# Patient Record
Sex: FEMALE | Race: ASIAN | NOT HISPANIC OR LATINO | URBAN - METROPOLITAN AREA
[De-identification: names, ages, dates, MRNs, and addresses within clinical notes are randomized per-mention and may not be internally consistent; named-entity substitution may affect disease eponyms.]

---

## 2018-12-18 ENCOUNTER — INPATIENT (INPATIENT)
Facility: HOSPITAL | Age: 53
LOS: 5 days | Discharge: HOME HEALTH | End: 2018-12-24
Attending: INTERNAL MEDICINE | Admitting: INTERNAL MEDICINE
Payer: MEDICAID

## 2018-12-18 VITALS
DIASTOLIC BLOOD PRESSURE: 69 MMHG | SYSTOLIC BLOOD PRESSURE: 116 MMHG | RESPIRATION RATE: 19 BRPM | HEART RATE: 72 BPM | TEMPERATURE: 99 F | OXYGEN SATURATION: 98 % | WEIGHT: 123.02 LBS

## 2018-12-18 LAB
ALBUMIN SERPL ELPH-MCNC: 3 G/DL — LOW (ref 3.3–5)
ALP SERPL-CCNC: 550 U/L — HIGH (ref 40–120)
ALT FLD-CCNC: 82 U/L — HIGH (ref 12–78)
ANION GAP SERPL CALC-SCNC: 9 MMOL/L — SIGNIFICANT CHANGE UP (ref 5–17)
APPEARANCE UR: CLEAR — SIGNIFICANT CHANGE UP
APTT BLD: 33.6 SEC — SIGNIFICANT CHANGE UP (ref 28.5–37)
AST SERPL-CCNC: 67 U/L — HIGH (ref 15–37)
BASOPHILS # BLD AUTO: 0.05 K/UL — SIGNIFICANT CHANGE UP (ref 0–0.2)
BASOPHILS NFR BLD AUTO: 0.5 % — SIGNIFICANT CHANGE UP (ref 0–2)
BILIRUB SERPL-MCNC: 2.3 MG/DL — HIGH (ref 0.2–1.2)
BILIRUB UR-MCNC: NEGATIVE — SIGNIFICANT CHANGE UP
BUN SERPL-MCNC: 6 MG/DL — LOW (ref 7–23)
CALCIUM SERPL-MCNC: 8.7 MG/DL — SIGNIFICANT CHANGE UP (ref 8.5–10.1)
CHLORIDE SERPL-SCNC: 99 MMOL/L — SIGNIFICANT CHANGE UP (ref 96–108)
CK MB CFR SERPL CALC: <1 NG/ML — SIGNIFICANT CHANGE UP (ref 0.5–3.6)
CO2 SERPL-SCNC: 28 MMOL/L — SIGNIFICANT CHANGE UP (ref 22–31)
COLOR SPEC: YELLOW — SIGNIFICANT CHANGE UP
CREAT SERPL-MCNC: 0.7 MG/DL — SIGNIFICANT CHANGE UP (ref 0.5–1.3)
DIFF PNL FLD: NEGATIVE — SIGNIFICANT CHANGE UP
EOSINOPHIL # BLD AUTO: 0.15 K/UL — SIGNIFICANT CHANGE UP (ref 0–0.5)
EOSINOPHIL NFR BLD AUTO: 1.4 % — SIGNIFICANT CHANGE UP (ref 0–6)
GLUCOSE SERPL-MCNC: 297 MG/DL — HIGH (ref 70–99)
GLUCOSE UR QL: 1000 MG/DL
HCT VFR BLD CALC: 32.3 % — LOW (ref 34.5–45)
HGB BLD-MCNC: 10.6 G/DL — LOW (ref 11.5–15.5)
IMM GRANULOCYTES NFR BLD AUTO: 0.6 % — SIGNIFICANT CHANGE UP (ref 0–1.5)
INR BLD: 1.3 RATIO — HIGH (ref 0.88–1.16)
KETONES UR-MCNC: NEGATIVE — SIGNIFICANT CHANGE UP
LEUKOCYTE ESTERASE UR-ACNC: ABNORMAL
LYMPHOCYTES # BLD AUTO: 2.62 K/UL — SIGNIFICANT CHANGE UP (ref 1–3.3)
LYMPHOCYTES # BLD AUTO: 25.2 % — SIGNIFICANT CHANGE UP (ref 13–44)
MAGNESIUM SERPL-MCNC: 2.2 MG/DL — SIGNIFICANT CHANGE UP (ref 1.6–2.6)
MCHC RBC-ENTMCNC: 25.1 PG — LOW (ref 27–34)
MCHC RBC-ENTMCNC: 32.8 GM/DL — SIGNIFICANT CHANGE UP (ref 32–36)
MCV RBC AUTO: 76.5 FL — LOW (ref 80–100)
MONOCYTES # BLD AUTO: 0.85 K/UL — SIGNIFICANT CHANGE UP (ref 0–0.9)
MONOCYTES NFR BLD AUTO: 8.2 % — SIGNIFICANT CHANGE UP (ref 2–14)
NEUTROPHILS # BLD AUTO: 6.68 K/UL — SIGNIFICANT CHANGE UP (ref 1.8–7.4)
NEUTROPHILS NFR BLD AUTO: 64.1 % — SIGNIFICANT CHANGE UP (ref 43–77)
NITRITE UR-MCNC: NEGATIVE — SIGNIFICANT CHANGE UP
NRBC # BLD: 0 /100 WBCS — SIGNIFICANT CHANGE UP (ref 0–0)
PH UR: 7 — SIGNIFICANT CHANGE UP (ref 5–8)
PLATELET # BLD AUTO: 193 K/UL — SIGNIFICANT CHANGE UP (ref 150–400)
POTASSIUM SERPL-MCNC: 4 MMOL/L — SIGNIFICANT CHANGE UP (ref 3.5–5.3)
POTASSIUM SERPL-SCNC: 4 MMOL/L — SIGNIFICANT CHANGE UP (ref 3.5–5.3)
PROT SERPL-MCNC: 8.1 GM/DL — SIGNIFICANT CHANGE UP (ref 6–8.3)
PROT UR-MCNC: NEGATIVE MG/DL — SIGNIFICANT CHANGE UP
PROTHROM AB SERPL-ACNC: 14.7 SEC — HIGH (ref 10–12.9)
RBC # BLD: 4.22 M/UL — SIGNIFICANT CHANGE UP (ref 3.8–5.2)
RBC # FLD: 15.7 % — HIGH (ref 10.3–14.5)
SODIUM SERPL-SCNC: 136 MMOL/L — SIGNIFICANT CHANGE UP (ref 135–145)
SP GR SPEC: 1 — LOW (ref 1.01–1.02)
TROPONIN I SERPL-MCNC: <.015 NG/ML — SIGNIFICANT CHANGE UP (ref 0.01–0.04)
TSH SERPL-MCNC: 2.09 UIU/ML — SIGNIFICANT CHANGE UP (ref 0.36–3.74)
UROBILINOGEN FLD QL: NEGATIVE MG/DL — SIGNIFICANT CHANGE UP
WBC # BLD: 10.41 K/UL — SIGNIFICANT CHANGE UP (ref 3.8–10.5)
WBC # FLD AUTO: 10.41 K/UL — SIGNIFICANT CHANGE UP (ref 3.8–10.5)

## 2018-12-18 PROCEDURE — 99223 1ST HOSP IP/OBS HIGH 75: CPT

## 2018-12-18 PROCEDURE — 71250 CT THORAX DX C-: CPT | Mod: 26

## 2018-12-18 PROCEDURE — 70450 CT HEAD/BRAIN W/O DYE: CPT | Mod: 26

## 2018-12-18 PROCEDURE — 99285 EMERGENCY DEPT VISIT HI MDM: CPT

## 2018-12-18 PROCEDURE — 93010 ELECTROCARDIOGRAM REPORT: CPT

## 2018-12-18 RX ORDER — MECLIZINE HCL 12.5 MG
25 TABLET ORAL ONCE
Qty: 0 | Refills: 0 | Status: COMPLETED | OUTPATIENT
Start: 2018-12-18 | End: 2018-12-18

## 2018-12-18 RX ADMIN — Medication 25 MILLIGRAM(S): at 20:48

## 2018-12-18 NOTE — H&P ADULT - NSHPPHYSICALEXAM_GEN_ALL_CORE
GENERAL: NAD, well-groomed, well-developed  HEAD:  Atraumatic, Normocephalic  EYES: EOMI, PERRLA, conjunctiva and sclera clear  ENMT: No tonsillar erythema, exudates, or enlargement; Moist mucous membranes, No lesions  NECK: Supple, No JVD, Normal thyroid  NERVOUS SYSTEM:  Alert & Oriented X3, Good concentration, CN 2-12 intact, strength 5/5, ataxia +  CHEST/LUNG: Clear to ascultation bilaterally; No rales, rhonchi, wheezing, or rubs  HEART: Regular rate and rhythm; No murmurs, rubs, or gallops  ABDOMEN: Soft, Nontender, Nondistended; Bowel sounds present  EXTREMITIES: no clubbing, cyanosis, or edema  SKIN: no rashes or lesions  PSYCH: normal affect and behavior

## 2018-12-18 NOTE — H&P ADULT - NSHPLABSRESULTS_GEN_ALL_CORE
Vital Signs Last 24 Hrs  T(C): 37.5 (19 Dec 2018 00:34), Max: 37.5 (19 Dec 2018 00:34)  T(F): 99.5 (19 Dec 2018 00:34), Max: 99.5 (19 Dec 2018 00:34)  HR: 74 (19 Dec 2018 00:34) (72 - 75)  BP: 119/70 (19 Dec 2018 00:34) (116/69 - 119/70)  BP(mean): --  RR: 18 (19 Dec 2018 00:34) (18 - 19)  SpO2: 98% (19 Dec 2018 00:34) (98% - 98%)          LABS:                        10.6   10.41 )-----------( 193      ( 18 Dec 2018 21:21 )             32.3         136  |  99  |  6<L>  ----------------------------<  297<H>  4.0   |  28  |  0.70    Ca    8.7      18 Dec 2018 21:21  Mg     2.2         TPro  8.1  /  Alb  3.0<L>  /  TBili  2.3<H>  /  DBili  x   /  AST  67<H>  /  ALT  82<H>  /  AlkPhos  550<H>  1218    PT/INR - ( 18 Dec 2018 21:21 )   PT: 14.7 sec;   INR: 1.30 ratio         PTT - ( 18 Dec 2018 21:21 )  PTT:33.6 sec  Urinalysis Basic - ( 18 Dec 2018 23:45 )    Color: Yellow / Appearance: Clear / S.005 / pH: x  Gluc: x / Ketone: Negative  / Bili: Negative / Urobili: Negative mg/dL   Blood: x / Protein: Negative mg/dL / Nitrite: Negative   Leuk Esterase: Trace / RBC: x / WBC 6-10   Sq Epi: x / Non Sq Epi: Moderate / Bacteria: x        RADIOLOGY & ADDITIONAL STUDIES:    CT head:  IMPRESSION:    1)  unremarkable CT study of the brain  2)  clear sinuses and mastoids..    CT chest:  IMPRESSION: No acute pathology in the chest  The visualized upper abdomen demonstrates large gallstones in the   gallbladder and significant dilatation of the common duct

## 2018-12-18 NOTE — H&P ADULT - ASSESSMENT
53 year old woman with PMH DM2, HTN, OA, Asthma presents to ED with complaint of ataxia x 2 weeks.  Patient will require admission for at least 2 midnights as detailed below:    IMPROVE VTE Individual Risk Assessment          RISK                                                          Points    [  ] Previous VTE                                                3    [  ] Thrombophilia                                             2    [  ] Lower limb paralysis                                    2        (unable to hold up >15 seconds)      [  ] Current Cancer                                             2         (within 6 months)    [ x ] Immobilization > 24 hrs                              1    [  ] ICU/CCU stay > 24 hours                            1    [  ] Age > 60                                                    1    IMPROVE VTE Score ______1___

## 2018-12-18 NOTE — H&P ADULT - PROBLEM SELECTOR PLAN 1
Neurochecks Q 4 hrly.  Aspirin daily  ECHO, carotid doppler, MRI brain  Neurology consult.  PT Evaluation.  Will send A1C, lipids

## 2018-12-18 NOTE — ED PROVIDER NOTE - PHYSICAL EXAMINATION
Gen: Alert, NAD  Head: NC, AT   Eyes: PERRL, EOMI, normal lids/conjunctiva  ENT: normal hearing, patent oropharynx without erythema/exudate, uvula midline  Neck: supple, no tenderness, Trachea midline  Pulm: Bilateral BS, normal resp effort, no wheeze/stridor/retractions  CV: RRR, no M/R/G, 2+ radial and dp pulses bl, no edema  Abd: soft, NT/ND, +BS, no hepatosplenomegaly  Mskel: extremities x4 with normal ROM and no joint effusions. no ctl spine ttp.   Skin: no rash, no bruising   Neuro: AAOx3, no sensory/motor deficits, CN 2-12 intact. gait unstable and patient does indeed appear to be falling forward

## 2018-12-18 NOTE — H&P ADULT - PROBLEM SELECTOR PLAN 2
Gallstones, dilated CBD, elevated transaminases without symptoms.  Can follow as outpatient Gallstones, dilated CBD, elevated transaminases without symptoms.  Can follow as outpatient  Hold statin

## 2018-12-18 NOTE — ED PROVIDER NOTE - OBJECTIVE STATEMENT
Pertinent PMH/PSH/FHx/SHx and Review of Systems contained within:  53F hx dm, asthma pw dizziness and fall. patient has been falling for the past 2 weeks Pertinent PMH/PSH/FHx/SHx and Review of Systems contained within:  53F hx dm, asthma pw dizziness and fall. patient has been falling for the past 2 weeks including several falls, the last of which was 4 days ago. as a result of that fall, patient has left headache and left flank and rib pain. there is some nausea without vomiting. no vision loss or epistaxis. patient's dizziness is best described as the sensation that she is falling forward (never to the side or back). there is no cp, sob, abd pain, rash, bleeding, dysuria, weakness, numbness, slurred speech. patient did not go to the er since her last fall  Fh and Sh not otherwise contributory  ROS otherwise negative

## 2018-12-18 NOTE — H&P ADULT - HISTORY OF PRESENT ILLNESS
Pt admitted for r/o CVA, in progress. 53 year old woman with PMH DM2, HTN, OA, Asthma presents to ED with complaint of ataxia x 2 weeks.  She states she feels like she is being pulled very strongly to the left side and has fallen several times.  She denies blurred vision, slurred speech, numbness, tingling, weakness, headache.  This is the first time she has experienced such symptoms.    In the ED, work up unremarkable- CT head normal, EKG NSR.  No abdominal pain; CMP does shows elevated transaminases, gallstones and CBD dilation. 53 year old woman with PMH DM2, HTN, OA, Asthma presents to ED with complaint of ataxia x 2 weeks.  She states she feels like she is being pulled very strongly to the left side and has fallen several times.  She denies blurred vision, slurred speech, numbness, tingling, weakness, headache.  This is the first time she has experienced such symptoms.    In the ED, work up unremarkable- CT head normal, EKG NSR.  No abdominal pain; CMP does shows elevated transaminases, CT chest shows gallstones and CBD dilation.

## 2018-12-18 NOTE — ED ADULT TRIAGE NOTE - CHIEF COMPLAINT QUOTE
patient c/o of weakness and dizziness for 2 weeks , patient  had a multiples falls hit her head as per daughter positive LOC , c/o of nausea and vomiting , patient also c/o of L flank pain , patient also c/o of  chest pain  started 2 weeks ago

## 2018-12-18 NOTE — ED PROVIDER NOTE - MEDICAL DECISION MAKING DETAILS
patient pw dizziness and falls, including 4 days ago with ha and flank pain. will rule out head bleed and rib fx. prefer to keep patient for MRI given the extent of the dizziness.

## 2018-12-19 DIAGNOSIS — R93.89 ABNORMAL FINDINGS ON DIAGNOSTIC IMAGING OF OTHER SPECIFIED BODY STRUCTURES: ICD-10-CM

## 2018-12-19 DIAGNOSIS — I10 ESSENTIAL (PRIMARY) HYPERTENSION: ICD-10-CM

## 2018-12-19 DIAGNOSIS — Z29.9 ENCOUNTER FOR PROPHYLACTIC MEASURES, UNSPECIFIED: ICD-10-CM

## 2018-12-19 DIAGNOSIS — E11.9 TYPE 2 DIABETES MELLITUS WITHOUT COMPLICATIONS: ICD-10-CM

## 2018-12-19 DIAGNOSIS — G62.9 POLYNEUROPATHY, UNSPECIFIED: ICD-10-CM

## 2018-12-19 DIAGNOSIS — R29.90 UNSPECIFIED SYMPTOMS AND SIGNS INVOLVING THE NERVOUS SYSTEM: ICD-10-CM

## 2018-12-19 DIAGNOSIS — J45.20 MILD INTERMITTENT ASTHMA, UNCOMPLICATED: ICD-10-CM

## 2018-12-19 LAB
ALBUMIN SERPL ELPH-MCNC: 3 G/DL — LOW (ref 3.3–5)
ALP SERPL-CCNC: 549 U/L — HIGH (ref 40–120)
ALT FLD-CCNC: 74 U/L — SIGNIFICANT CHANGE UP (ref 12–78)
ANION GAP SERPL CALC-SCNC: 8 MMOL/L — SIGNIFICANT CHANGE UP (ref 5–17)
AST SERPL-CCNC: 63 U/L — HIGH (ref 15–37)
BASOPHILS # BLD AUTO: 0.04 K/UL — SIGNIFICANT CHANGE UP (ref 0–0.2)
BASOPHILS NFR BLD AUTO: 0.4 % — SIGNIFICANT CHANGE UP (ref 0–2)
BILIRUB SERPL-MCNC: 2.2 MG/DL — HIGH (ref 0.2–1.2)
BUN SERPL-MCNC: 5 MG/DL — LOW (ref 7–23)
CALCIUM SERPL-MCNC: 8.9 MG/DL — SIGNIFICANT CHANGE UP (ref 8.5–10.1)
CHLORIDE SERPL-SCNC: 103 MMOL/L — SIGNIFICANT CHANGE UP (ref 96–108)
CHOLEST SERPL-MCNC: 215 MG/DL — HIGH (ref 10–199)
CO2 SERPL-SCNC: 27 MMOL/L — SIGNIFICANT CHANGE UP (ref 22–31)
CREAT SERPL-MCNC: 0.61 MG/DL — SIGNIFICANT CHANGE UP (ref 0.5–1.3)
EOSINOPHIL # BLD AUTO: 0.13 K/UL — SIGNIFICANT CHANGE UP (ref 0–0.5)
EOSINOPHIL NFR BLD AUTO: 1.5 % — SIGNIFICANT CHANGE UP (ref 0–6)
EPI CELLS # UR: ABNORMAL
GLUCOSE BLDC GLUCOMTR-MCNC: 105 MG/DL — HIGH (ref 70–99)
GLUCOSE BLDC GLUCOMTR-MCNC: 199 MG/DL — HIGH (ref 70–99)
GLUCOSE BLDC GLUCOMTR-MCNC: 221 MG/DL — HIGH (ref 70–99)
GLUCOSE SERPL-MCNC: 227 MG/DL — HIGH (ref 70–99)
HBA1C BLD-MCNC: 8 % — HIGH (ref 4–5.6)
HCT VFR BLD CALC: 32.1 % — LOW (ref 34.5–45)
HDLC SERPL-MCNC: 41 MG/DL — LOW
HGB BLD-MCNC: 10.6 G/DL — LOW (ref 11.5–15.5)
IMM GRANULOCYTES NFR BLD AUTO: 0.8 % — SIGNIFICANT CHANGE UP (ref 0–1.5)
LIPID PNL WITH DIRECT LDL SERPL: 130 MG/DL — HIGH
LYMPHOCYTES # BLD AUTO: 2.58 K/UL — SIGNIFICANT CHANGE UP (ref 1–3.3)
LYMPHOCYTES # BLD AUTO: 28.9 % — SIGNIFICANT CHANGE UP (ref 13–44)
MCHC RBC-ENTMCNC: 25 PG — LOW (ref 27–34)
MCHC RBC-ENTMCNC: 33 GM/DL — SIGNIFICANT CHANGE UP (ref 32–36)
MCV RBC AUTO: 75.7 FL — LOW (ref 80–100)
MONOCYTES # BLD AUTO: 0.76 K/UL — SIGNIFICANT CHANGE UP (ref 0–0.9)
MONOCYTES NFR BLD AUTO: 8.5 % — SIGNIFICANT CHANGE UP (ref 2–14)
NEUTROPHILS # BLD AUTO: 5.34 K/UL — SIGNIFICANT CHANGE UP (ref 1.8–7.4)
NEUTROPHILS NFR BLD AUTO: 59.9 % — SIGNIFICANT CHANGE UP (ref 43–77)
PLATELET # BLD AUTO: 195 K/UL — SIGNIFICANT CHANGE UP (ref 150–400)
POTASSIUM SERPL-MCNC: 4 MMOL/L — SIGNIFICANT CHANGE UP (ref 3.5–5.3)
POTASSIUM SERPL-SCNC: 4 MMOL/L — SIGNIFICANT CHANGE UP (ref 3.5–5.3)
PROT SERPL-MCNC: 7.8 GM/DL — SIGNIFICANT CHANGE UP (ref 6–8.3)
RBC # BLD: 4.24 M/UL — SIGNIFICANT CHANGE UP (ref 3.8–5.2)
RBC # FLD: 15.8 % — HIGH (ref 10.3–14.5)
SODIUM SERPL-SCNC: 138 MMOL/L — SIGNIFICANT CHANGE UP (ref 135–145)
TOTAL CHOLESTEROL/HDL RATIO MEASUREMENT: 5.2 RATIO — SIGNIFICANT CHANGE UP (ref 3.3–7.1)
TRIGL SERPL-MCNC: 220 MG/DL — HIGH (ref 10–149)
WBC # BLD: 8.92 K/UL — SIGNIFICANT CHANGE UP (ref 3.8–10.5)
WBC # FLD AUTO: 8.92 K/UL — SIGNIFICANT CHANGE UP (ref 3.8–10.5)
WBC UR QL: ABNORMAL

## 2018-12-19 PROCEDURE — 72100 X-RAY EXAM L-S SPINE 2/3 VWS: CPT | Mod: 26

## 2018-12-19 PROCEDURE — 99233 SBSQ HOSP IP/OBS HIGH 50: CPT

## 2018-12-19 PROCEDURE — 70551 MRI BRAIN STEM W/O DYE: CPT | Mod: 26

## 2018-12-19 PROCEDURE — 93880 EXTRACRANIAL BILAT STUDY: CPT | Mod: 26

## 2018-12-19 PROCEDURE — 93306 TTE W/DOPPLER COMPLETE: CPT | Mod: 26

## 2018-12-19 PROCEDURE — 76700 US EXAM ABDOM COMPLETE: CPT | Mod: 26

## 2018-12-19 RX ORDER — SODIUM CHLORIDE 9 MG/ML
1000 INJECTION, SOLUTION INTRAVENOUS
Qty: 0 | Refills: 0 | Status: DISCONTINUED | OUTPATIENT
Start: 2018-12-19 | End: 2018-12-22

## 2018-12-19 RX ORDER — ATORVASTATIN CALCIUM 80 MG/1
10 TABLET, FILM COATED ORAL AT BEDTIME
Qty: 0 | Refills: 0 | Status: DISCONTINUED | OUTPATIENT
Start: 2018-12-19 | End: 2018-12-19

## 2018-12-19 RX ORDER — DEXTROSE 50 % IN WATER 50 %
25 SYRINGE (ML) INTRAVENOUS ONCE
Qty: 0 | Refills: 0 | Status: DISCONTINUED | OUTPATIENT
Start: 2018-12-19 | End: 2018-12-22

## 2018-12-19 RX ORDER — INSULIN LISPRO 100/ML
VIAL (ML) SUBCUTANEOUS
Qty: 0 | Refills: 0 | Status: DISCONTINUED | OUTPATIENT
Start: 2018-12-19 | End: 2018-12-22

## 2018-12-19 RX ORDER — INSULIN LISPRO 100/ML
VIAL (ML) SUBCUTANEOUS AT BEDTIME
Qty: 0 | Refills: 0 | Status: DISCONTINUED | OUTPATIENT
Start: 2018-12-19 | End: 2018-12-22

## 2018-12-19 RX ORDER — OXYCODONE AND ACETAMINOPHEN 5; 325 MG/1; MG/1
1 TABLET ORAL EVERY 6 HOURS
Qty: 0 | Refills: 0 | Status: DISCONTINUED | OUTPATIENT
Start: 2018-12-19 | End: 2018-12-22

## 2018-12-19 RX ORDER — HEPARIN SODIUM 5000 [USP'U]/ML
5000 INJECTION INTRAVENOUS; SUBCUTANEOUS EVERY 8 HOURS
Qty: 0 | Refills: 0 | Status: DISCONTINUED | OUTPATIENT
Start: 2018-12-19 | End: 2018-12-22

## 2018-12-19 RX ORDER — INSULIN GLARGINE 100 [IU]/ML
10 INJECTION, SOLUTION SUBCUTANEOUS AT BEDTIME
Qty: 0 | Refills: 0 | Status: DISCONTINUED | OUTPATIENT
Start: 2018-12-19 | End: 2018-12-22

## 2018-12-19 RX ORDER — DULOXETINE HYDROCHLORIDE 30 MG/1
60 CAPSULE, DELAYED RELEASE ORAL DAILY
Qty: 0 | Refills: 0 | Status: DISCONTINUED | OUTPATIENT
Start: 2018-12-19 | End: 2018-12-22

## 2018-12-19 RX ORDER — ASPIRIN/CALCIUM CARB/MAGNESIUM 324 MG
325 TABLET ORAL DAILY
Qty: 0 | Refills: 0 | Status: DISCONTINUED | OUTPATIENT
Start: 2018-12-19 | End: 2018-12-21

## 2018-12-19 RX ORDER — MECLIZINE HCL 12.5 MG
25 TABLET ORAL EVERY 8 HOURS
Qty: 0 | Refills: 0 | Status: DISCONTINUED | OUTPATIENT
Start: 2018-12-19 | End: 2018-12-22

## 2018-12-19 RX ORDER — PANTOPRAZOLE SODIUM 20 MG/1
40 TABLET, DELAYED RELEASE ORAL
Qty: 0 | Refills: 0 | Status: DISCONTINUED | OUTPATIENT
Start: 2018-12-19 | End: 2018-12-22

## 2018-12-19 RX ORDER — DEXTROSE 50 % IN WATER 50 %
15 SYRINGE (ML) INTRAVENOUS ONCE
Qty: 0 | Refills: 0 | Status: DISCONTINUED | OUTPATIENT
Start: 2018-12-19 | End: 2018-12-22

## 2018-12-19 RX ORDER — MONTELUKAST 4 MG/1
10 TABLET, CHEWABLE ORAL DAILY
Qty: 0 | Refills: 0 | Status: DISCONTINUED | OUTPATIENT
Start: 2018-12-19 | End: 2018-12-22

## 2018-12-19 RX ORDER — GLUCAGON INJECTION, SOLUTION 0.5 MG/.1ML
1 INJECTION, SOLUTION SUBCUTANEOUS ONCE
Qty: 0 | Refills: 0 | Status: DISCONTINUED | OUTPATIENT
Start: 2018-12-19 | End: 2018-12-22

## 2018-12-19 RX ORDER — GABAPENTIN 400 MG/1
100 CAPSULE ORAL THREE TIMES A DAY
Qty: 0 | Refills: 0 | Status: DISCONTINUED | OUTPATIENT
Start: 2018-12-19 | End: 2018-12-22

## 2018-12-19 RX ORDER — DEXTROSE 50 % IN WATER 50 %
12.5 SYRINGE (ML) INTRAVENOUS ONCE
Qty: 0 | Refills: 0 | Status: DISCONTINUED | OUTPATIENT
Start: 2018-12-19 | End: 2018-12-22

## 2018-12-19 RX ADMIN — Medication 500 MILLIGRAM(S): at 20:00

## 2018-12-19 RX ADMIN — OXYCODONE AND ACETAMINOPHEN 1 TABLET(S): 5; 325 TABLET ORAL at 09:00

## 2018-12-19 RX ADMIN — Medication 2: at 17:37

## 2018-12-19 RX ADMIN — Medication 325 MILLIGRAM(S): at 03:48

## 2018-12-19 RX ADMIN — OXYCODONE AND ACETAMINOPHEN 1 TABLET(S): 5; 325 TABLET ORAL at 08:00

## 2018-12-19 RX ADMIN — OXYCODONE AND ACETAMINOPHEN 1 TABLET(S): 5; 325 TABLET ORAL at 17:40

## 2018-12-19 RX ADMIN — OXYCODONE AND ACETAMINOPHEN 1 TABLET(S): 5; 325 TABLET ORAL at 18:31

## 2018-12-19 RX ADMIN — DULOXETINE HYDROCHLORIDE 60 MILLIGRAM(S): 30 CAPSULE, DELAYED RELEASE ORAL at 11:40

## 2018-12-19 RX ADMIN — GABAPENTIN 100 MILLIGRAM(S): 400 CAPSULE ORAL at 21:58

## 2018-12-19 RX ADMIN — Medication 25 MILLIGRAM(S): at 21:58

## 2018-12-19 RX ADMIN — PANTOPRAZOLE SODIUM 40 MILLIGRAM(S): 20 TABLET, DELAYED RELEASE ORAL at 06:48

## 2018-12-19 RX ADMIN — HEPARIN SODIUM 5000 UNIT(S): 5000 INJECTION INTRAVENOUS; SUBCUTANEOUS at 05:31

## 2018-12-19 RX ADMIN — Medication 5 MILLIGRAM(S): at 05:31

## 2018-12-19 RX ADMIN — HEPARIN SODIUM 5000 UNIT(S): 5000 INJECTION INTRAVENOUS; SUBCUTANEOUS at 11:40

## 2018-12-19 RX ADMIN — Medication 4: at 11:39

## 2018-12-19 RX ADMIN — Medication 4: at 07:34

## 2018-12-19 RX ADMIN — MONTELUKAST 10 MILLIGRAM(S): 4 TABLET, CHEWABLE ORAL at 11:40

## 2018-12-19 RX ADMIN — Medication 500 MILLIGRAM(S): at 18:56

## 2018-12-19 RX ADMIN — GABAPENTIN 100 MILLIGRAM(S): 400 CAPSULE ORAL at 11:40

## 2018-12-19 RX ADMIN — HEPARIN SODIUM 5000 UNIT(S): 5000 INJECTION INTRAVENOUS; SUBCUTANEOUS at 21:58

## 2018-12-19 RX ADMIN — GABAPENTIN 100 MILLIGRAM(S): 400 CAPSULE ORAL at 05:31

## 2018-12-19 NOTE — CONSULT NOTE ADULT - SUBJECTIVE AND OBJECTIVE BOX
Subjective Complaints:  Historian:       Consult requested by ER doctor:                  Attending: DR FRANCO    HPI:  53 year old woman with PMH DM2, HTN, OA, Asthma presents to ED with complaint of ataxia x 2 weeks.  She states she feels like she is being pulled very strongly to the left side and has fallen several times.  She denies blurred vision, slurred speech, numbness, tingling, weakness, headache.  This is the first time she has experienced such symptoms.    In the ED, work up unremarkable- CT head normal, EKG NSR.  No abdominal pain; CMP does shows elevated transaminases, CT chest shows gallstones and CBD dilation. (18 Dec 2018 22:54)    NOEMI SOUSA    PAST MEDICAL & SURGICAL HISTORY:  Type 2 diabetes mellitus without complication, without long-term current use of insulin  Neuropathy  Arthritis  Mild intermittent asthma without complication  Gastroesophageal reflux disease without esophagitis  Essential hypertension  No significant past surgical history  53yFemale    MEDICATIONS  (STANDING):  aspirin 325 milliGRAM(s) Oral daily  dextrose 5%. 1000 milliLiter(s) (50 mL/Hr) IV Continuous <Continuous>  dextrose 50% Injectable 12.5 Gram(s) IV Push once  dextrose 50% Injectable 25 Gram(s) IV Push once  dextrose 50% Injectable 25 Gram(s) IV Push once  DULoxetine 60 milliGRAM(s) Oral daily  gabapentin 100 milliGRAM(s) Oral three times a day  heparin  Injectable 5000 Unit(s) SubCutaneous every 8 hours  insulin glargine Injectable (LANTUS) 10 Unit(s) SubCutaneous at bedtime  insulin lispro (HumaLOG) corrective regimen sliding scale   SubCutaneous three times a day before meals  insulin lispro (HumaLOG) corrective regimen sliding scale   SubCutaneous at bedtime  meclizine 25 milliGRAM(s) Oral every 8 hours  montelukast 10 milliGRAM(s) Oral daily  naproxen 500 milliGRAM(s) Oral two times a day  pantoprazole    Tablet 40 milliGRAM(s) Oral before breakfast  predniSONE   Tablet 5 milliGRAM(s) Oral daily    MEDICATIONS  (PRN):  dextrose 40% Gel 15 Gram(s) Oral once PRN Blood Glucose LESS THAN 70 milliGRAM(s)/deciliter  glucagon  Injectable 1 milliGRAM(s) IntraMuscular once PRN Glucose LESS THAN 70 milligrams/deciliter  oxyCODONE    5 mG/acetaminophen 325 mG 1 Tablet(s) Oral every 6 hours PRN Moderate Pain (4 - 6)      Allergies    No Known Allergies    Intolerances      FAMILY HISTORY:  No pertinent family history in first degree relatives      REVIEW OF SYSTEMS:  General:  No wt loss, fevers, chills, night sweats  Eyes:  Good vision, no reported pain  ENT:  No sore throat, pain, runny nose, dysphagia  CV:  No pain, palpitatioins, hypo/hypertension  Resp:  No dyspnea, cough, tachypnea, wheezing  GI:  No pain, nausea, vomiting, diarrhea, constipatiion  :  No pain, bleeding, incontinence, nocturia  Muscle:  No pain, weakness  Breast:  No pain, abscess, mass, discharge  Neuro:  No weakness, tingling, memory problems  Psych:  No fatigue, insomnia, mood problems, depression  Endocrine:  No polyuria, polydypsia, cold/heat intolerance  Heme:  No petechiae, ecchymosis, easy bruisability  Skin:  No rash, tattoos, scars, edema      Vital Signs Last 24 Hrs  T(C): 36.9 (19 Dec 2018 16:35), Max: 37.5 (19 Dec 2018 00:34)  T(F): 98.4 (19 Dec 2018 16:35), Max: 99.5 (19 Dec 2018 00:34)  HR: 68 (19 Dec 2018 16:35) (64 - 75)  BP: 110/62 (19 Dec 2018 16:35) (101/58 - 119/70)  BP(mean): --  RR: 18 (19 Dec 2018 16:35) (18 - 19)  SpO2: 99% (19 Dec 2018 16:35) (97% - 99%)    GENERAL PHYSICAL EXAM:  General:  Appears stated age, well-groomed, well-nourished, no distress  HEENT:  NC/AT, patent nares w/ pink mucosa, OP clear w/o lesions, PERRL, EOMI, conjunctivae clear, no thyromegaly, nodules, adenopathy, no JVD  Chest:  Full & symmetric excursion, no increased effort, breath sounds clear  Cardiovascular:  Regular rhythm, S1, S2, no murmur/rub/S3/S4, no carotid/femoral/abdominal bruit, radial/pedal pulses 2+, no edema  Abdomen:  Soft, non-tender, non-distended, normoactive bowel sounds, no HSM  Extremities:  Gait & station:   Digits:   Nails:   Joints, Bones, Muscles:   ROM:   Stability:  Skin:  No rash/erythema/ecchymoses/petechiae/wounds/abscess/warm/dry  Musculoskeletal:  Full ROM in all joints w/o swelling/tenderness/effusion    NEUROLOGICAL EXAM:  HENT:  Normocephalic head; atraumatic head.  Neck supple.  ENT: normal looking.  Mental State:    Alert.  Fully oriented to person, place and date.  Coherent.  Speech clear and intact.  Cooperative.  Responds appropriately.    Cranial Nerves:  II-XII:   Pupils round and reactive to light and accommodation.  Extraocular movements full.  Visual fields full (no homonymous hemianopsia).  Visual acuity wnl.  Facial symmetry intact.  Tongue midline.  Motor Functions:  Moves all EXTREMITIES AT A STRENGTH OF 5/5   Sensory Functions:   Intact to touch and pinprick to face and extremities.    Reflexes:  Deep tendon reflexes normoactive to biceps, knees and ankles. PLANTAR RESPONSES ARE FLEXOR   Cerebellar Testing:    Finger to nose intact.  Nystagmus absent.  Gait : unremarkable     LABS:                        10.6   8.92  )-----------( 195      ( 19 Dec 2018 08:36 )             32.1         138  |  103  |  5<L>  ----------------------------<  227<H>  4.0   |  27  |  0.61    Ca    8.9      19 Dec 2018 08:36  Mg     2.2     -    TPro  7.8  /  Alb  3.0<L>  /  TBili  2.2<H>  /  DBili  x   /  AST  63<H>  /  ALT  74  /  AlkPhos  549<H>      PT/INR - ( 18 Dec 2018 21:21 )   PT: 14.7 sec;   INR: 1.30 ratio         PTT - ( 18 Dec 2018 21:21 )  PTT:33.6 sec    Urinalysis Basic - ( 18 Dec 2018 23:45 )    Color: Yellow / Appearance: Clear / S.005 / pH: x  Gluc: x / Ketone: Negative  / Bili: Negative / Urobili: Negative mg/dL   Blood: x / Protein: Negative mg/dL / Nitrite: Negative   Leuk Esterase: Trace / RBC: x / WBC 6-10   Sq Epi: x / Non Sq Epi: Moderate / Bacteria: x        RADIOLOGY & ADDITIONAL STUDIES:    (Floorstock):   Qty Removed: 1 each ( @ 20:47)  Nucleated RBC: 21:05 ( @ 21:21)  Admit to Inpatient Level of Care:     Service:  MEDICAL/SURGICAL    Physician:  Emre Franco    Additional Instructions:  Diagnosis: Dizziness; Imbalance  Isolation: None  Special Consideration: None ( @ 22:31)  Provider to RN:       Pt can go to tele floor now. ( @ 22:35)  Vital Signs:     Frequency:  per routine ( @ 22:35)  Remote Telemetry/EKG EXCEPT Off Unit Tests:     Time/Priority:  Routine ( @ 22:35)  Fall Risk Protocol:     Time/Priority:  Routine ( @ 22:35)  Aspiration Precautions:     Time/Priority:  Routine ( @ 22:35)  Activity - Bedrest ( @ 22:35)  Transfer in Level of Care and or Service:     Transfer to Service: Telemetry ( @ 22:36)  Admit from ED ( @ 22:43)  Diet, Consistent Carbohydrate/No Snacks:   Halal ( @ 23:39)  Urine Microscopic-Add On (NC): 23:00 ( @ 23:54)  gabapentin: [Known as NEURONTIN]  100 milliGRAM(s), Oral, three times a day  Provider's Contact #: [phone] ( @ 02:55)  predniSONE   Tablet: [Known as DELTASONE]  5 milliGRAM(s), Oral, daily  Administration Instructions: This is a Look-alike/Sound-alike Medication  Provider's Contact #: [phone] ( @ 02:55)  DULoxetine: [Known as CYMBALTA]  60 milliGRAM(s), Oral, daily  Administration Instructions: swallow whole * don't crush/chew  This is a Look-alike/Sound-alike Medication  Provider's Contact #: [phone] ( @ 02:55)  atorvastatin: [Known as LIPITOR]  10 milliGRAM(s), Oral, at bedtime  Provider's Contact #: [phone] ( @ 02:55)  montelukast: [Known as SINGULAIR]  10 milliGRAM(s), Oral, daily  Provider's Contact #: [phone] ( @ 02:55)  pantoprazole    Tablet: [Known as PROTONIX   DR]  40 milliGRAM(s), Oral, before breakfast  Administration Instructions: swallow whole * don't crush/chew ( @ 02:55)  Assess Neurological Status:     Type of Neuro Check:  Stroke    Frequency:  every 4 hours ( @ 03:13)  aspirin:   325 milliGRAM(s), Oral, daily  Provider's Contact #: [phone] ( @ 03:13)  TTE Echo Doppler w/o Cont:   Transport: Wheelchair  Monitor: w/o Monitor  Exam Completed  Provider's Contact #: [phone] ( 03:13)  US Duplex Carotid Arteries Complete, Bilateral: Routine   Indication: r/o CVA  Transport: Wheelchair  Exam Completed  Provider's Contact #: [phone] ( 03:13)  MR Head No Cont: Routine   Indication: CVA  Transport: Wheelchair  Exam Completed  Provider's Contact #: [phone] ( 03:13)  Consult- PT Evaluate and Treat:   *Reason for Consult - Must select at least one choice*     Neuro Event  Weight Bearing Restrictions: No ( @ 03:13)  Complete Blood Count + Automated Diff: 07:00 ( @ 03:13)  Comprehensive Metabolic Panel: 07:00 ( @ 03:13)  Hemoglobin A1C, Whole Blood: 07:00 ( @ 03:13)  Lipid Profile: 07:00 ( @ 03:13)  insulin glargine Injectable (LANTUS): [Ordered as LANTUS]  10 Unit(s), SubCutaneous, at bedtime  Administration Instructions: NOT to be mixed with other insulins  Dispose unused medication in BLACK bin.  Provider's Contact #: [phone] ( @ 03:13)  Blood Glucose Point Of Care Testing:     Frequency:  Before meals and at bedtime    Additional Instructions:  Before meals and at bedtime ( @ 03:13)  Blood Glucose Point Of Care Testing:     Frequency:  every 15 minutes    Additional Instructions:  After carbohydrate administration for hypoglycemia, repeat every 15 minute(s) until blood glucose is GREATER THAN or EQUAL  milliGRAM(s)/deciLiter twice consecutively ( 03:13)  Notify Provider For:     Additional Instructions:  Blood glucose LESS THAN 70 milliGRAM(s)/deciLiter or GREATER THAN 400 milliGRAM(s)/deciLiter ( 03:13)  Education:     Diabetes    Other: Diet, Exercise    Additional Instructions: Diet, Exercise, Diabetes ( 03:13)  insulin lispro (HumaLOG) corrective regimen sliding scale:       2 Unit(s) if Glucose 151 - 200      4 Unit(s) if Glucose 201 - 250      6 Unit(s) if Glucose 251 - 300      8 Unit(s) if Glucose 301 - 350      10 Unit(s) if Glucose 351 - 400      12 Unit(s) if Glucose Greater Than 400 + Contact MD  SubCutaneous, three times a day before meals  Special Instructions: Give correctional scale insulin REGARDLESS of PO status NOTIFY Provider for blood glucose LESS THAN 70 milliGRAM(s)/deciLiter or above 400 milliGRAM(s)/deciLiter.  Administration Instructions: *Per Sliding Scale*  Dispose unused medication in BLACK bin.  This is a Look-alike/Sound-alike Medication  Provider's Contact #: [phone] ( @ 03:13)  insulin lispro (HumaLOG) corrective regimen sliding scale:       0 Unit(s) if Glucose 0 - 250      1 Unit(s) if Glucose 251 - 300      2 Unit(s) if Glucose 301 - 350      3 Unit(s) if Glucose 351 - 400      4 Unit(s) if Glucose Greater Than 400 + Contact Provider  SubCutaneous, at bedtime  Special Instructions: Give correctional scale insulin REGARDLESS of PO status NOTIFY Provider for blood glucose LESS THAN 70 milliGRAM(s)/deciLiter or above 400 milliGRAM(s)/deciLiter.  Administration Instructions: Dispose unused medication in BLACK bin.  This is a Look-alike/Sound-alike Medication  Provider's Contact #: [phone] ( 03:13)  dextrose 5%.: Solution, 1000 milliLiter(s) infuse at 50 mL/Hr  Special Instructions: Conditional Order: HYPOGLYCEMIA PROTOCOL  Provider's Contact #: [phone] ( @ 03:13)  Administer Carbohydrates:     Additional Instructions:  STAT RESPONSIVE patient and Blood Glucose is LESS THAN 70 milliGRAM(s)/deciLiter: Administer 15 grams of fast acting carbohydrate [e.g., Give 4 ounces of APPLE Juice OR 6 ounces of NON-DIET soda OR 1 tube (15 grams) oral glucose gel (available in Automated Dispensing Machine)] and recheck capillary blood glucose in 15 minutes.  Repeat treatment and recheck glucose every 15 minutes until Blood Glucose is GREATER THAN or EQUAL  milliGRAM(s)/deciLiter and NOTIFY Provider.  HYPOGLYCEMIA PROTOCOL ( 03:13)  dextrose 40% Gel: [Known as GLUTOSE 15]  15 Gram(s), Oral, once, PRN for Blood Glucose LESS THAN 70 milliGRAM(s)/deciliter, Stop After 1 Doses  Special Instructions: Conditional Order: HYPOGLYCEMIA PROTOCOL  Administration Instructions: Contents of 37.5 Gram(s) tube delivers 15 Gram(s) dextrose  Provider's Contact #: [phone] ( @ 03:13)  dextrose 50% Injectable:   12.5 Gram(s), IV Push, once, Stop After 1 Doses  Special Instructions: Conditional Order: HYPOGLYCEMIA PROTOCOL  Provider's Contact #: [phone] ( 03:13)  dextrose 50% Injectable:   25 Gram(s), IV Push, once, Stop After 1 Doses  Special Instructions: Conditional Order: HYPOGLYCEMIA PROTOCOL  Provider's Contact #: [phone] ( 03:13)  dextrose 50% Injectable:   25 Gram(s), IV Push, once, Stop After 1 Doses  Special Instructions: Conditional Order: HYPOGLYCEMIA PROTOCOL  Provider's Contact #: [phone] ( 03:13)  glucagon  Injectable:   1 milliGRAM(s), IntraMuscular, once, PRN for Glucose LESS THAN 70 milligrams/deciliter, Stop After 1 Doses  Special Instructions: Conditional Order: HYPOGLYCEMIA PROTOCOL  Provider's Contact #: [phone] ( 03:13)  Provider to RN:       UNRESPONSIVE patient and Blood Glucose LESS THAN 70 milliGRAM(s)/deciLiter call Rapid Response.  HYPOGLYCEMIA PROTOCOL ( 03:13)  heparin  Injectable:   5000 Unit(s), SubCutaneous, every 8 hours  Provider's Contact #: [phone] ( 03:13)  Xray Lumbosacral Spine 4 View: Routine   Indication: back pain  Transport: Stretcher-Crib  Exam Completed  Provider's Contact #: [phone] ( @ 07:46)  oxyCODONE    5 mG/acetaminophen 325 mG: [Ordered as PERCOCET]  1 Tablet(s), Oral, every 6 hours, PRN for Moderate Pain (4 - 6)  Administration Instructions: ACETAMINOPHEN MAX DAILY DOSE:  ADULT = 4,000 mG/Day  This is a Look-alike/Sound-alike Medication  Provider's Contact #: [phone] ( @ 07:46)  POCT  Blood Glucose: 07:33 ( @ 08:01)  Nucleated RBC: 07:55 ( @ 08:37)  US Abdomen Complete: Routine   Indication: gallstones  Transport: Stretcher-Crib  Exam Completed  Provider's Contact #: [phone] ( @ 08:59)  POCT  Blood Glucose: 11:37 ( @ 11:47)  POCT  Blood Glucose: 16:36 ( @ 16:37)  meclizine: [Known as ANTIVERT]  25 milliGRAM(s), Oral, every 8 hours  Provider's Contact #: [phone] ( @ 18:00)  naproxen: [Ordered as NAPROSYN]  500 milliGRAM(s), Oral, two times a day  Provider's Contact #: [phone] ( @ 18:00)  Complete Blood Count: AM Sched. Collection: 20-Dec-2018 05:00 ( @ 18:00)  Basic Metabolic Panel: AM Sched. Collection: 20-Dec-2018 05:00 ( @ 18:00)      Assessment & Opinion:53 y o woman seen for evaluation because of recurrent falls is fiund to have an unremarkable neuro exam   DX Recurrent Falls     Recommendations:     DVT prophylaxis as ordered. PT EVALUATION   Medications:

## 2018-12-19 NOTE — PROGRESS NOTE ADULT - SUBJECTIVE AND OBJECTIVE BOX
Patient is a 53y old  Female who presents with a chief complaint of R/o CVA (18 Dec 2018 22:54)      INTERVAL HPI/OVERNIGHT EVENTS: no acute events. pt still feels light headed. denies the room is spinning, n/v chest pain     MEDICATIONS  (STANDING):  aspirin 325 milliGRAM(s) Oral daily  dextrose 5%. 1000 milliLiter(s) (50 mL/Hr) IV Continuous <Continuous>  dextrose 50% Injectable 12.5 Gram(s) IV Push once  dextrose 50% Injectable 25 Gram(s) IV Push once  dextrose 50% Injectable 25 Gram(s) IV Push once  DULoxetine 60 milliGRAM(s) Oral daily  gabapentin 100 milliGRAM(s) Oral three times a day  heparin  Injectable 5000 Unit(s) SubCutaneous every 8 hours  insulin glargine Injectable (LANTUS) 10 Unit(s) SubCutaneous at bedtime  insulin lispro (HumaLOG) corrective regimen sliding scale   SubCutaneous three times a day before meals  insulin lispro (HumaLOG) corrective regimen sliding scale   SubCutaneous at bedtime  montelukast 10 milliGRAM(s) Oral daily  pantoprazole    Tablet 40 milliGRAM(s) Oral before breakfast  predniSONE   Tablet 5 milliGRAM(s) Oral daily    MEDICATIONS  (PRN):  dextrose 40% Gel 15 Gram(s) Oral once PRN Blood Glucose LESS THAN 70 milliGRAM(s)/deciliter  glucagon  Injectable 1 milliGRAM(s) IntraMuscular once PRN Glucose LESS THAN 70 milligrams/deciliter  oxyCODONE    5 mG/acetaminophen 325 mG 1 Tablet(s) Oral every 6 hours PRN Moderate Pain (4 - 6)      Allergies    No Known Allergies    Intolerances        REVIEW OF SYSTEMS:  CONSTITUTIONAL: + fatigue  EYES: No eye pain, visual disturbances, or discharge  ENMT:  No difficulty hearing, tinnitus, vertigo; No sinus or throat pain  RESPIRATORY: No cough, wheezing, chills or hemoptysis; No shortness of breath  CARDIOVASCULAR: No chest pain, palpitations, dizziness, or leg swelling  GASTROINTESTINAL: No abdominal or epigastric pain. No nausea, vomiting, or hematemesis; No diarrhea or constipation. No melena or hematochezia.  GENITOURINARY: No dysuria, frequency, hematuria, or incontinence  NEUROLOGICAL: No headaches, memory loss, loss of strength, numbness, or tremors  SKIN: No itching, burning, rashes, or lesions   LYMPH NODES: No enlarged glands  ENDOCRINE: No heat or cold intolerance; No hair loss  MUSCULOSKELETAL: back pain  PSYCHIATRIC: No depression, anxiety, mood swings, or difficulty sleeping  HEME/LYMPH: No easy bruising, or bleeding gums  ALLERGY AND IMMUNOLOGIC: No hives or eczema    Vital Signs Last 24 Hrs  T(C): 36.1 (19 Dec 2018 12:12), Max: 37.5 (19 Dec 2018 00:34)  T(F): 96.9 (19 Dec 2018 12:12), Max: 99.5 (19 Dec 2018 00:34)  HR: 69 (19 Dec 2018 12:12) (64 - 75)  BP: 101/58 (19 Dec 2018 12:12) (101/58 - 119/70)  BP(mean): --  RR: 18 (19 Dec 2018 12:12) (18 - 19)  SpO2: 97% (19 Dec 2018 12:12) (97% - 98%)    PHYSICAL EXAM:  GENERAL: NAD, well-groomed, well-developed  HEAD:  Atraumatic, Normocephalic  EYES: EOMI, PERRLA, conjunctiva and sclera clear  ENMT: No tonsillar erythema, exudates, or enlargement; Moist mucous membranes, Good dentition, No lesions  NECK: Supple, No JVD, Normal thyroid  NERVOUS SYSTEM:  Alert & Oriented X3, Good concentration; Motor Strength 5/5 B/L upper and lower extremities; DTRs 2+ intact and symmetric  CHEST/LUNG: Clear to percussion bilaterally; No rales, rhonchi, wheezing, or rubs  HEART: Regular rate and rhythm; No murmurs, rubs, or gallops  ABDOMEN: Soft, Nontender, Nondistended; Bowel sounds present  EXTREMITIES:  2+ Peripheral Pulses, No clubbing, cyanosis, or edema  LYMPH: No lymphadenopathy noted  SKIN: No rashes or lesions    LABS:                        10.6   8.92  )-----------( 195      ( 19 Dec 2018 08:36 )             32.1     -    138  |  103  |  5<L>  ----------------------------<  227<H>  4.0   |  27  |  0.61    Ca    8.9      19 Dec 2018 08:36  Mg     2.2     12    TPro  7.8  /  Alb  3.0<L>  /  TBili  2.2<H>  /  DBili  x   /  AST  63<H>  /  ALT  74  /  AlkPhos  549<H>  12-19    PT/INR - ( 18 Dec 2018 21:21 )   PT: 14.7 sec;   INR: 1.30 ratio         PTT - ( 18 Dec 2018 21:21 )  PTT:33.6 sec  Urinalysis Basic - ( 18 Dec 2018 23:45 )    Color: Yellow / Appearance: Clear / S.005 / pH: x  Gluc: x / Ketone: Negative  / Bili: Negative / Urobili: Negative mg/dL   Blood: x / Protein: Negative mg/dL / Nitrite: Negative   Leuk Esterase: Trace / RBC: x / WBC 6-10   Sq Epi: x / Non Sq Epi: Moderate / Bacteria: x      CAPILLARY BLOOD GLUCOSE      POCT Blood Glucose.: 199 mg/dL (19 Dec 2018 16:36)  POCT Blood Glucose.: 221 mg/dL (19 Dec 2018 11:37)  POCT Blood Glucose.: 227 mg/dL (19 Dec 2018 07:33)      RADIOLOGY & ADDITIONAL TESTS:    Imaging Personally Reviewed:  [ ] YES  [ ] NO    Consultant(s) Notes Reviewed:  [ ] YES  [ ] NO    Care Discussed with Consultants/Other Providers [ ] YES  [ ] NO

## 2018-12-20 DIAGNOSIS — R26.89 OTHER ABNORMALITIES OF GAIT AND MOBILITY: ICD-10-CM

## 2018-12-20 DIAGNOSIS — K80.20 CALCULUS OF GALLBLADDER WITHOUT CHOLECYSTITIS WITHOUT OBSTRUCTION: ICD-10-CM

## 2018-12-20 LAB
ANION GAP SERPL CALC-SCNC: 6 MMOL/L — SIGNIFICANT CHANGE UP (ref 5–17)
BUN SERPL-MCNC: 9 MG/DL — SIGNIFICANT CHANGE UP (ref 7–23)
CALCIUM SERPL-MCNC: 8.8 MG/DL — SIGNIFICANT CHANGE UP (ref 8.5–10.1)
CHLORIDE SERPL-SCNC: 98 MMOL/L — SIGNIFICANT CHANGE UP (ref 96–108)
CO2 SERPL-SCNC: 29 MMOL/L — SIGNIFICANT CHANGE UP (ref 22–31)
CREAT SERPL-MCNC: 0.67 MG/DL — SIGNIFICANT CHANGE UP (ref 0.5–1.3)
GLUCOSE BLDC GLUCOMTR-MCNC: 203 MG/DL — HIGH (ref 70–99)
GLUCOSE BLDC GLUCOMTR-MCNC: 328 MG/DL — HIGH (ref 70–99)
GLUCOSE BLDC GLUCOMTR-MCNC: 353 MG/DL — HIGH (ref 70–99)
GLUCOSE BLDC GLUCOMTR-MCNC: 383 MG/DL — HIGH (ref 70–99)
GLUCOSE SERPL-MCNC: 294 MG/DL — HIGH (ref 70–99)
HCT VFR BLD CALC: 32.8 % — LOW (ref 34.5–45)
HGB BLD-MCNC: 10.7 G/DL — LOW (ref 11.5–15.5)
MCHC RBC-ENTMCNC: 25 PG — LOW (ref 27–34)
MCHC RBC-ENTMCNC: 32.6 GM/DL — SIGNIFICANT CHANGE UP (ref 32–36)
MCV RBC AUTO: 76.6 FL — LOW (ref 80–100)
NRBC # BLD: 0 /100 WBCS — SIGNIFICANT CHANGE UP (ref 0–0)
PLATELET # BLD AUTO: 174 K/UL — SIGNIFICANT CHANGE UP (ref 150–400)
POTASSIUM SERPL-MCNC: 4 MMOL/L — SIGNIFICANT CHANGE UP (ref 3.5–5.3)
POTASSIUM SERPL-SCNC: 4 MMOL/L — SIGNIFICANT CHANGE UP (ref 3.5–5.3)
RBC # BLD: 4.28 M/UL — SIGNIFICANT CHANGE UP (ref 3.8–5.2)
RBC # FLD: 15.7 % — HIGH (ref 10.3–14.5)
SODIUM SERPL-SCNC: 133 MMOL/L — LOW (ref 135–145)
WBC # BLD: 12.79 K/UL — HIGH (ref 3.8–10.5)
WBC # FLD AUTO: 12.79 K/UL — HIGH (ref 3.8–10.5)

## 2018-12-20 PROCEDURE — 99222 1ST HOSP IP/OBS MODERATE 55: CPT

## 2018-12-20 PROCEDURE — 99233 SBSQ HOSP IP/OBS HIGH 50: CPT

## 2018-12-20 RX ORDER — SODIUM CHLORIDE 9 MG/ML
1000 INJECTION INTRAMUSCULAR; INTRAVENOUS; SUBCUTANEOUS
Qty: 0 | Refills: 0 | Status: DISCONTINUED | OUTPATIENT
Start: 2018-12-20 | End: 2018-12-22

## 2018-12-20 RX ORDER — PIPERACILLIN AND TAZOBACTAM 4; .5 G/20ML; G/20ML
3.38 INJECTION, POWDER, LYOPHILIZED, FOR SOLUTION INTRAVENOUS EVERY 8 HOURS
Qty: 0 | Refills: 0 | Status: DISCONTINUED | OUTPATIENT
Start: 2018-12-20 | End: 2018-12-22

## 2018-12-20 RX ADMIN — MONTELUKAST 10 MILLIGRAM(S): 4 TABLET, CHEWABLE ORAL at 12:22

## 2018-12-20 RX ADMIN — DULOXETINE HYDROCHLORIDE 60 MILLIGRAM(S): 30 CAPSULE, DELAYED RELEASE ORAL at 12:22

## 2018-12-20 RX ADMIN — GABAPENTIN 100 MILLIGRAM(S): 400 CAPSULE ORAL at 06:10

## 2018-12-20 RX ADMIN — PANTOPRAZOLE SODIUM 40 MILLIGRAM(S): 20 TABLET, DELAYED RELEASE ORAL at 06:10

## 2018-12-20 RX ADMIN — Medication 1 DROP(S): at 12:25

## 2018-12-20 RX ADMIN — Medication 5 MILLIGRAM(S): at 06:10

## 2018-12-20 RX ADMIN — Medication 500 MILLIGRAM(S): at 18:49

## 2018-12-20 RX ADMIN — Medication 10: at 07:33

## 2018-12-20 RX ADMIN — Medication 500 MILLIGRAM(S): at 17:37

## 2018-12-20 RX ADMIN — Medication 25 MILLIGRAM(S): at 21:46

## 2018-12-20 RX ADMIN — Medication 8: at 17:36

## 2018-12-20 RX ADMIN — PIPERACILLIN AND TAZOBACTAM 25 GRAM(S): 4; .5 INJECTION, POWDER, LYOPHILIZED, FOR SOLUTION INTRAVENOUS at 21:46

## 2018-12-20 RX ADMIN — HEPARIN SODIUM 5000 UNIT(S): 5000 INJECTION INTRAVENOUS; SUBCUTANEOUS at 21:46

## 2018-12-20 RX ADMIN — HEPARIN SODIUM 5000 UNIT(S): 5000 INJECTION INTRAVENOUS; SUBCUTANEOUS at 13:24

## 2018-12-20 RX ADMIN — HEPARIN SODIUM 5000 UNIT(S): 5000 INJECTION INTRAVENOUS; SUBCUTANEOUS at 06:10

## 2018-12-20 RX ADMIN — Medication 10: at 12:23

## 2018-12-20 RX ADMIN — Medication 25 MILLIGRAM(S): at 06:10

## 2018-12-20 RX ADMIN — GABAPENTIN 100 MILLIGRAM(S): 400 CAPSULE ORAL at 21:46

## 2018-12-20 RX ADMIN — SODIUM CHLORIDE 110 MILLILITER(S): 9 INJECTION INTRAMUSCULAR; INTRAVENOUS; SUBCUTANEOUS at 23:58

## 2018-12-20 RX ADMIN — Medication 500 MILLIGRAM(S): at 06:10

## 2018-12-20 RX ADMIN — INSULIN GLARGINE 10 UNIT(S): 100 INJECTION, SOLUTION SUBCUTANEOUS at 21:47

## 2018-12-20 RX ADMIN — Medication 25 MILLIGRAM(S): at 13:23

## 2018-12-20 RX ADMIN — Medication 1 DROP(S): at 23:58

## 2018-12-20 RX ADMIN — GABAPENTIN 100 MILLIGRAM(S): 400 CAPSULE ORAL at 13:23

## 2018-12-20 RX ADMIN — Medication 500 MILLIGRAM(S): at 07:10

## 2018-12-20 RX ADMIN — Medication 1 DROP(S): at 17:36

## 2018-12-20 RX ADMIN — Medication 325 MILLIGRAM(S): at 12:22

## 2018-12-20 NOTE — PHYSICAL THERAPY INITIAL EVALUATION ADULT - GENERAL OBSERVATIONS, REHAB EVAL
Chart (EMR) reviewed. Received supine in bed c HOB elevated, NAD. Patient c cardiac monitor donned. Alert. Language barrier (Armenian). Daughter present in the room and act as  preferred by patient.

## 2018-12-20 NOTE — CONSULT NOTE ADULT - SUBJECTIVE AND OBJECTIVE BOX
Patient seen and examined at bedside in no distress, daughter bedside.   Patient is a 53 year old female with PMH DM, HTN who presented to the ED s/p ataxia with falls for a few weeks. Today, patient c/o intermittent, burning epigastric pain. Patient states that she has had similar pains in the past, but always thought it was heartburn. Has been tolerating a regular diet; denies nausea/vomiting. +flatus/BM. Denies fever, chills, chest pain, sob.    PAST MEDICAL & SURGICAL HISTORY:  Type 2 diabetes mellitus without complication, without long-term current use of insulin  Neuropathy  Arthritis  Mild intermittent asthma without complication  Gastroesophageal reflux disease without esophagitis  Essential hypertension  No significant past surgical history    Review of Systems:  Contained within HPI    MEDICATIONS  (STANDING):  artificial  tears Solution 1 Drop(s) Both EYES four times a day  aspirin 325 milliGRAM(s) Oral daily  dextrose 5%. 1000 milliLiter(s) (50 mL/Hr) IV Continuous <Continuous>  dextrose 50% Injectable 12.5 Gram(s) IV Push once  dextrose 50% Injectable 25 Gram(s) IV Push once  dextrose 50% Injectable 25 Gram(s) IV Push once  DULoxetine 60 milliGRAM(s) Oral daily  gabapentin 100 milliGRAM(s) Oral three times a day  heparin  Injectable 5000 Unit(s) SubCutaneous every 8 hours  insulin glargine Injectable (LANTUS) 10 Unit(s) SubCutaneous at bedtime  insulin lispro (HumaLOG) corrective regimen sliding scale   SubCutaneous three times a day before meals  insulin lispro (HumaLOG) corrective regimen sliding scale   SubCutaneous at bedtime  meclizine 25 milliGRAM(s) Oral every 8 hours  montelukast 10 milliGRAM(s) Oral daily  naproxen 500 milliGRAM(s) Oral two times a day  pantoprazole    Tablet 40 milliGRAM(s) Oral before breakfast  predniSONE   Tablet 5 milliGRAM(s) Oral daily    MEDICATIONS  (PRN):  dextrose 40% Gel 15 Gram(s) Oral once PRN Blood Glucose LESS THAN 70 milliGRAM(s)/deciliter  glucagon  Injectable 1 milliGRAM(s) IntraMuscular once PRN Glucose LESS THAN 70 milligrams/deciliter  oxyCODONE    5 mG/acetaminophen 325 mG 1 Tablet(s) Oral every 6 hours PRN Moderate Pain (4 - 6)      Allergies  No Known Allergies    FAMILY HISTORY:  No pertinent family history in first degree relatives      Vital Signs Last 24 Hrs  T(C): 37.1 (20 Dec 2018 17:34), Max: 37.3 (19 Dec 2018 21:16)  T(F): 98.7 (20 Dec 2018 17:34), Max: 99.2 (19 Dec 2018 21:16)  HR: 67 (20 Dec 2018 17:34) (67 - 74)  BP: 111/60 (20 Dec 2018 17:34) (107/57 - 123/59)  RR: 18 (20 Dec 2018 17:34) (18 - 18)  SpO2: 98% (20 Dec 2018 17:34) (97% - 98%)    Physical Exam:  General:  Appears stated age, well-groomed, well-nourished, no distress  HENT: WNL, no JVD  Chest: CTA b/l, no w/r/r, respirations nonlabored  Cardiovascular: S1S2 RRR  Abdomen: +BS, soft, nondistended, TTP RUQ, no guarding/rigidity  Extremities: No pedal edema b/l, no calf tenderness  Neuro/Psych: Alert and oriented, NAD    LABS:                        10.7   12.79 )-----------( 174      ( 20 Dec 2018 07:12 )             32.8     12-20    133<L>  |  98  |  9   ----------------------------<  294<H>  4.0   |  29  |  0.67    Ca    8.8      20 Dec 2018 07:12  Mg     2.2     12-18    TPro  7.8  /  Alb  3.0<L>  /  TBili  2.2<H>  /  DBili  x   /  AST  63<H>  /  ALT  74  /  AlkPhos  549<H>  12-19    PT/INR - ( 18 Dec 2018 21:21 )   PT: 14.7 sec;   INR: 1.30 ratio      PTT - ( 18 Dec 2018 21:21 )  PTT:33.6 sec  Urinalysis Basic - ( 18 Dec 2018 23:45 )    Color: Yellow / Appearance: Clear / S.005 / pH: x  Gluc: x / Ketone: Negative  / Bili: Negative / Urobili: Negative mg/dL   Blood: x / Protein: Negative mg/dL / Nitrite: Negative   Leuk Esterase: Trace / RBC: x / WBC 6-10   Sq Epi: x / Non Sq Epi: Moderate / Bacteria: x    RADIOLOGY & ADDITIONAL STUDIES:  CT Chest No Cont (18 @ 19:48)   FINDINGS:    There is no significant axillary, hilar, or mediastinal lymphadenopathy.   There is no pleural or pericardial effusion.  There is no evidence of infiltrate, mass or nodule.  The osseous structures are unremarkable.  The upper abdomen is remarkable for a large calcified stones in the   gallbladder. The visualized CBD is dilated measuring up to 15 mm.  IMPRESSION: No acute pathology in the chest  The visualized upper abdomen demonstrates large gallstones in the   gallbladder and significant dilatation of the common duct    US Abdomen Complete (18 @ 10:20)   FINDINGS:  Liver: Within normal limits.  Bile ducts: Common bile duct measures 12 mm.   Gallbladder: Cholelithiasis. Mild wall thickening (5 mm). Negative   sonographic Hallman's sign.     Pancreas: Visualized portions are within normal limits.  Spleen: 8 cm. Within normal limits.  Right kidney: 10.5 cm. No hydronephrosis.      Left kidney: 11 cm.  No hydronephrosis.      Ascites: None.  Aorta and IVC: Visualized portions are within normal limits.  IMPRESSION:   Cholelithiasis and nonspecific gallbladder wall thickening.  Negative sonographic Hallman's sign.  Biliary ductal dilatation with CBD measuring up to 12 mm.

## 2018-12-20 NOTE — DIETITIAN INITIAL EVALUATION ADULT. - PROBLEM SELECTOR PLAN 2
Gallstones, dilated CBD, elevated transaminases without symptoms.  Can follow as outpatient  Hold statin

## 2018-12-20 NOTE — PHYSICAL THERAPY INITIAL EVALUATION ADULT - PLANNED THERAPY INTERVENTIONS, PT EVAL
gait training/strengthening/postural re-education/transfer training/balance training/bed mobility training

## 2018-12-20 NOTE — DIETITIAN INITIAL EVALUATION ADULT. - PT NOT SOURCE
other (specify)/Language barrier, pt opted for daughter to translate, pt was able to respond to some basic questions

## 2018-12-20 NOTE — DIETITIAN INITIAL EVALUATION ADULT. - PERTINENT LABORATORY DATA
12-20 Na 133 mmol/L<L> Glu 294 mg/dL<H> K+ 4.0 mmol/L Cr  0.67 mg/dL BUN 9 mg/dL Phos n/a   Alb n/a   PAB n/a   Hgb 10.7 g/dL<L> Hct 32.8 %<L> POCT: 383H (12-20 @12:21) ;  353H (12-20 @07:31); 105H (12-19 @21:56)

## 2018-12-20 NOTE — DIETITIAN INITIAL EVALUATION ADULT. - PHYSICAL APPEARANCE
well nourished/BMI 22; no edema noted; well nourished/BMI 22; no edema noted; Nutrition focused physical exam conducted:  Subcutaneous fat loss: [ WDL] Orbital fat pads region, [ WDL]Buccal fat region, [ WDL]Triceps region,  [ WDL]Ribs region.  Muscle wasting: [ mild ]Temples region, [ WDL]Clavicle region, [WDL ]Shoulder region, [WDL ]Scapula region, [WDL ]Interosseous region,  [WDL ]thigh region, [ WDL]Calf region

## 2018-12-20 NOTE — CONSULT NOTE ADULT - ASSESSMENT
53 year old female with PMH DM, HTN, a/w falls, RUQ pain, now with leukocytosis, found to have cholelithiasis and dilated CBD with elevated bilirubin, transaminases.  Hyponatremia, hyperglycemia

## 2018-12-20 NOTE — DIETITIAN INITIAL EVALUATION ADULT. - FACTORS AFF FOOD INTAKE
Confucianist/ethnic/cultural/personal food preferences/persistent lack of appetite/Halal and  food choices high in carbohydrate

## 2018-12-20 NOTE — DIETITIAN INITIAL EVALUATION ADULT. - NS FNS REASON FOR WEIGHT CHANG
decreased po intake/decreased appetite/other (specify) other (specify)/decreased po intake/decreased appetite; eats well when fed by daughter

## 2018-12-20 NOTE — PHYSICAL THERAPY INITIAL EVALUATION ADULT - GAIT DEVIATIONS NOTED, PT EVAL
increased stride width/decreased step length/decreased stride length/decreased weight-shifting ability/decreased nissa

## 2018-12-20 NOTE — PHYSICAL THERAPY INITIAL EVALUATION ADULT - PERTINENT HX OF CURRENT PROBLEM, REHAB EVAL
Patient is a 54 y/o female admitted to [facility] due to dizziness and imbalance. Had couple episode of falls at home and c/o back pain. MRI showed negative acute findings. X-ray for lumbar spine negative fracture. Dopper to BLE negative DVT.

## 2018-12-20 NOTE — DIETITIAN INITIAL EVALUATION ADULT. - PERTINENT MEDS FT
MEDICATIONS  (STANDING):  artificial  tears Solution 1 Drop(s) Both EYES four times a day  aspirin 325 milliGRAM(s) Oral daily  dextrose 5%. 1000 milliLiter(s) (50 mL/Hr) IV Continuous <Continuous>  dextrose 50% Injectable 12.5 Gram(s) IV Push once  dextrose 50% Injectable 25 Gram(s) IV Push once  dextrose 50% Injectable 25 Gram(s) IV Push once  DULoxetine 60 milliGRAM(s) Oral daily  gabapentin 100 milliGRAM(s) Oral three times a day  heparin  Injectable 5000 Unit(s) SubCutaneous every 8 hours  insulin glargine Injectable (LANTUS) 10 Unit(s) SubCutaneous at bedtime  insulin lispro (HumaLOG) corrective regimen sliding scale   SubCutaneous three times a day before meals  insulin lispro (HumaLOG) corrective regimen sliding scale   SubCutaneous at bedtime  meclizine 25 milliGRAM(s) Oral every 8 hours  montelukast 10 milliGRAM(s) Oral daily  naproxen 500 milliGRAM(s) Oral two times a day  pantoprazole    Tablet 40 milliGRAM(s) Oral before breakfast  predniSONE   Tablet 5 milliGRAM(s) Oral daily    MEDICATIONS  (PRN):  dextrose 40% Gel 15 Gram(s) Oral once PRN Blood Glucose LESS THAN 70 milliGRAM(s)/deciliter  glucagon  Injectable 1 milliGRAM(s) IntraMuscular once PRN Glucose LESS THAN 70 milligrams/deciliter  oxyCODONE    5 mG/acetaminophen 325 mG 1 Tablet(s) Oral every 6 hours PRN Moderate Pain (4 - 6)

## 2018-12-20 NOTE — DIETITIAN INITIAL EVALUATION ADULT. - OTHER INFO
Pt seen today for A1c 8.0. Pt lives at home with adult children. Children and family members share duties of assisting with ADL. Daughter does cooking and shopping. Spoke with daughter at bedside. Daughter reported pt with poor PO intake x 2-3 days PTA and having to assist with feedings in ensure nutrition was met. Pt daughter reports purchasing Glucerna to supplement protein and nutrition needs.  Pt daughter reported taking BG fingersticks daily with readings averaging around 200mg/dL. Discussed with daughter the pt's a1c level(8.0), the goal range of A1c & finger stick readings and importance of consuming carbohydrate consistent diet. Educated pt and daughter on carb consistent meals and provided hand out Medical Nutrition Therapy for Type II Diabetes. Pt seen today for A1c 8.0. Pt lives at home with adult children. Children and family members share duties of assisting with ADL. Daughter does cooking and shopping. Spoke with daughter at bedside. Daughter reported pt with poor PO intake x 2-3 days PTA and having to assist with feedings in ensure nutrition was met, meals are high in carbohydrates (ie banana and white rice). Pt daughter reports purchasing Glucerna to supplement protein and nutrition needs.  Pt daughter reported taking BG fingersticks daily with readings averaging around 200mg/dL. Discussed with daughter the pt's a1c level(8.0), the goal range of A1c & finger stick readings and importance of consuming carbohydrate consistent diet. Educated pt and daughter on carb consistent meals and provided hand out Medical Nutrition Therapy for Type II Diabetes.

## 2018-12-20 NOTE — PHYSICAL THERAPY INITIAL EVALUATION ADULT - CRITERIA FOR SKILLED THERAPEUTIC INTERVENTIONS
therapy frequency/functional limitations in following categories/predicted duration of therapy intervention/impairments found/risk reduction/prevention/rehab potential

## 2018-12-20 NOTE — PHYSICAL THERAPY INITIAL EVALUATION ADULT - ADDITIONAL COMMENTS
Patient lives c family in a pvt house c 8 steps c bilateral rails(far apart), all amenities in the 1st floor. Independent c ADL's and ambulation without device up until 2 weeks ago when patient stated feeling dizziness.

## 2018-12-20 NOTE — PHYSICAL THERAPY INITIAL EVALUATION ADULT - ACTIVE RANGE OF MOTION EXAMINATION, REHAB EVAL
bilateral lower extremity Active ROM was WNL (within normal limits)/cole. upper extremity Active ROM was WNL (within normal limits)

## 2018-12-20 NOTE — PROGRESS NOTE ADULT - SUBJECTIVE AND OBJECTIVE BOX
Patient is a 53y old  Female who presents with a chief complaint of R/o CVA (18 Dec 2018 22:54)      INTERVAL HPI/OVERNIGHT EVENTS: pt reports mild RUQ pain today.  denies the room is spinning, n/v chest pain. dizziness    MEDICATIONS  (STANDING):  artificial  tears Solution 1 Drop(s) Both EYES four times a day  aspirin 325 milliGRAM(s) Oral daily  dextrose 5%. 1000 milliLiter(s) (50 mL/Hr) IV Continuous <Continuous>  dextrose 50% Injectable 12.5 Gram(s) IV Push once  dextrose 50% Injectable 25 Gram(s) IV Push once  dextrose 50% Injectable 25 Gram(s) IV Push once  DULoxetine 60 milliGRAM(s) Oral daily  gabapentin 100 milliGRAM(s) Oral three times a day  heparin  Injectable 5000 Unit(s) SubCutaneous every 8 hours  insulin glargine Injectable (LANTUS) 10 Unit(s) SubCutaneous at bedtime  insulin lispro (HumaLOG) corrective regimen sliding scale   SubCutaneous three times a day before meals  insulin lispro (HumaLOG) corrective regimen sliding scale   SubCutaneous at bedtime  meclizine 25 milliGRAM(s) Oral every 8 hours  montelukast 10 milliGRAM(s) Oral daily  naproxen 500 milliGRAM(s) Oral two times a day  pantoprazole    Tablet 40 milliGRAM(s) Oral before breakfast  predniSONE   Tablet 5 milliGRAM(s) Oral daily    MEDICATIONS  (PRN):  dextrose 40% Gel 15 Gram(s) Oral once PRN Blood Glucose LESS THAN 70 milliGRAM(s)/deciliter  glucagon  Injectable 1 milliGRAM(s) IntraMuscular once PRN Glucose LESS THAN 70 milligrams/deciliter  oxyCODONE    5 mG/acetaminophen 325 mG 1 Tablet(s) Oral every 6 hours PRN Moderate Pain (4 - 6)        Allergies    No Known Allergies    Intolerances        REVIEW OF SYSTEMS:  CONSTITUTIONAL: + fatigue  EYES: No eye pain, visual disturbances, or discharge  ENMT:  No difficulty hearing, tinnitus, vertigo; No sinus or throat pain  RESPIRATORY: No cough, wheezing, chills or hemoptysis; No shortness of breath  CARDIOVASCULAR: No chest pain, palpitations, dizziness, or leg swelling  GASTROINTESTINAL: abdominal pain. No nausea, vomiting, or hematemesis; No diarrhea or constipation. No melena or hematochezia.  GENITOURINARY: No dysuria, frequency, hematuria, or incontinence  NEUROLOGICAL: No headaches, memory loss, loss of strength, numbness, or tremors  SKIN: No itching, burning, rashes, or lesions   LYMPH NODES: No enlarged glands  ENDOCRINE: No heat or cold intolerance; No hair loss  MUSCULOSKELETAL: back pain  PSYCHIATRIC: No depression, anxiety, mood swings, or difficulty sleeping  HEME/LYMPH: No easy bruising, or bleeding gums  ALLERGY AND IMMUNOLOGIC: No hives or eczema    Vital Signs Last 24 Hrs  T(C): 36.7 (20 Dec 2018 12:44), Max: 37.3 (19 Dec 2018 21:16)  T(F): 98 (20 Dec 2018 12:44), Max: 99.2 (19 Dec 2018 21:16)  HR: 68 (20 Dec 2018 12:44) (68 - 74)  BP: 123/59 (20 Dec 2018 12:44) (107/57 - 123/59)  BP(mean): --  RR: 18 (20 Dec 2018 12:44) (18 - 18)  SpO2: 98% (20 Dec 2018 12:44) (97% - 99%)    PHYSICAL EXAM:  GENERAL: NAD, well-groomed, well-developed  HEAD:  Atraumatic, Normocephalic  EYES: EOMI, PERRLA, conjunctiva and sclera clear  ENMT: No tonsillar erythema, exudates, or enlargement; Moist mucous membranes, Good dentition, No lesions  NECK: Supple, No JVD, Normal thyroid  NERVOUS SYSTEM:  Alert & Oriented X3, Good concentration; Motor Strength 5/5 B/L upper and lower extremities; DTRs 2+ intact and symmetric  CHEST/LUNG: Clear to percussion bilaterally; No rales, rhonchi, wheezing, or rubs  HEART: Regular rate and rhythm; No murmurs, rubs, or gallops  ABDOMEN: Soft, Nontender, Nondistended; Bowel sounds present  EXTREMITIES:  2+ Peripheral Pulses, No clubbing, cyanosis, or edema  LYMPH: No lymphadenopathy noted  SKIN: No rashes or lesions    LABS:                                            10.7   12.79 )-----------( 174      ( 20 Dec 2018 07:12 )             32.8     12-20    133<L>  |  98  |  9   ----------------------------<  294<H>  4.0   |  29  |  0.67    Ca    8.8      20 Dec 2018 07:12  Mg     2.2     12-    TPro  7.8  /  Alb  3.0<L>  /  TBili  2.2<H>  /  DBili  x   /  AST  63<H>  /  ALT  74  /  AlkPhos  549<H>  12-    PT/INR - ( 18 Dec 2018 21:21 )   PT: 14.7 sec;   INR: 1.30 ratio         PTT - ( 18 Dec 2018 21:21 )  PTT:33.6 sec  Urinalysis Basic - ( 18 Dec 2018 23:45 )    Color: Yellow / Appearance: Clear / S.005 / pH: x  Gluc: x / Ketone: Negative  / Bili: Negative / Urobili: Negative mg/dL   Blood: x / Protein: Negative mg/dL / Nitrite: Negative   Leuk Esterase: Trace / RBC: x / WBC 6-10   Sq Epi: x / Non Sq Epi: Moderate / Bacteria: x            CAPILLARY BLOOD GLUCOSE      POCT Blood Glucose.: 199 mg/dL (19 Dec 2018 16:36)  POCT Blood Glucose.: 221 mg/dL (19 Dec 2018 11:37)  POCT Blood Glucose.: 227 mg/dL (19 Dec 2018 07:33)      RADIOLOGY & ADDITIONAL TESTS:    Imaging Personally Reviewed:  [ ] YES  [ ] NO    Consultant(s) Notes Reviewed:  [ ] YES  [ ] NO    Care Discussed with Consultants/Other Providers [ ] YES  [ ] NO

## 2018-12-20 NOTE — CONSULT NOTE ADULT - ATTENDING COMMENTS
I saw and examined the patient at bedside. I agree with the examination findings and the assessment and plan. Will follow the MRCP results as the current working diagnosis is choledocholithiasis with or without acute cholecystitis vs symptomatic choledocholithiasis. I discussed the plan with the patient and her daughter at bedside and all questions were answered.

## 2018-12-20 NOTE — DIETITIAN INITIAL EVALUATION ADULT. - NS AS NUTRI INTERV ED CONTENT
Priority modifications/Survival information/Nutrition relationship to health/disease/Medical Nutrition Therapy for Type II Diabetes/Purpose of the nutrition education

## 2018-12-20 NOTE — DIETITIAN INITIAL EVALUATION ADULT. - ENERGY NEEDS
Height (cm): 152.4 (12-19)  Weight (kg): 52.2 (12-19)  BMI (kg/m2): 22.5 (12-19)  IBW: 45.3  % IBW: 114%

## 2018-12-20 NOTE — CONSULT NOTE ADULT - PROBLEM SELECTOR RECOMMENDATION 9
MRCP to r/o choledocholithiasis, NPO for test  IV hydration  F/u LFTs, WBC  Pain management PRN  Antiemetics PRN  Discussed with Dr. Yee MRCP to r/o choledocholithiasis, discussed with MRI tech, scheduled for tomorrow AM, NPO p MN in preparation  IV hydration  F/u LFTs, WBC  Pain management PRN  Antiemetics PRN  Discussed with Dr. Yee

## 2018-12-21 ENCOUNTER — TRANSCRIPTION ENCOUNTER (OUTPATIENT)
Age: 53
End: 2018-12-21

## 2018-12-21 LAB
ALBUMIN SERPL ELPH-MCNC: 2.5 G/DL — LOW (ref 3.3–5)
ALP SERPL-CCNC: 436 U/L — HIGH (ref 40–120)
ALT FLD-CCNC: 54 U/L — SIGNIFICANT CHANGE UP (ref 12–78)
ANION GAP SERPL CALC-SCNC: 7 MMOL/L — SIGNIFICANT CHANGE UP (ref 5–17)
APTT BLD: 42.5 SEC — HIGH (ref 27.5–36.3)
AST SERPL-CCNC: 49 U/L — HIGH (ref 15–37)
BILIRUB DIRECT SERPL-MCNC: 2.26 MG/DL — HIGH (ref 0.05–0.2)
BILIRUB INDIRECT FLD-MCNC: 0.2 MG/DL — SIGNIFICANT CHANGE UP (ref 0.2–1)
BILIRUB SERPL-MCNC: 2.5 MG/DL — HIGH (ref 0.2–1.2)
BLD GP AB SCN SERPL QL: SIGNIFICANT CHANGE UP
BUN SERPL-MCNC: 9 MG/DL — SIGNIFICANT CHANGE UP (ref 7–23)
CALCIUM SERPL-MCNC: 8.3 MG/DL — LOW (ref 8.5–10.1)
CHLORIDE SERPL-SCNC: 102 MMOL/L — SIGNIFICANT CHANGE UP (ref 96–108)
CO2 SERPL-SCNC: 27 MMOL/L — SIGNIFICANT CHANGE UP (ref 22–31)
CREAT SERPL-MCNC: 0.7 MG/DL — SIGNIFICANT CHANGE UP (ref 0.5–1.3)
GLUCOSE BLDC GLUCOMTR-MCNC: 117 MG/DL — HIGH (ref 70–99)
GLUCOSE BLDC GLUCOMTR-MCNC: 134 MG/DL — HIGH (ref 70–99)
GLUCOSE BLDC GLUCOMTR-MCNC: 196 MG/DL — HIGH (ref 70–99)
GLUCOSE BLDC GLUCOMTR-MCNC: 229 MG/DL — HIGH (ref 70–99)
GLUCOSE SERPL-MCNC: 214 MG/DL — HIGH (ref 70–99)
HCT VFR BLD CALC: 30.1 % — LOW (ref 34.5–45)
HGB BLD-MCNC: 9.9 G/DL — LOW (ref 11.5–15.5)
INR BLD: 1.35 RATIO — HIGH (ref 0.88–1.16)
LIDOCAIN IGE QN: 670 U/L — HIGH (ref 73–393)
MAGNESIUM SERPL-MCNC: 2 MG/DL — SIGNIFICANT CHANGE UP (ref 1.6–2.6)
MCHC RBC-ENTMCNC: 25 PG — LOW (ref 27–34)
MCHC RBC-ENTMCNC: 32.9 GM/DL — SIGNIFICANT CHANGE UP (ref 32–36)
MCV RBC AUTO: 76 FL — LOW (ref 80–100)
NRBC # BLD: 0 /100 WBCS — SIGNIFICANT CHANGE UP (ref 0–0)
PHOSPHATE SERPL-MCNC: 2.5 MG/DL — SIGNIFICANT CHANGE UP (ref 2.5–4.5)
PLATELET # BLD AUTO: 162 K/UL — SIGNIFICANT CHANGE UP (ref 150–400)
POTASSIUM SERPL-MCNC: 3.7 MMOL/L — SIGNIFICANT CHANGE UP (ref 3.5–5.3)
POTASSIUM SERPL-SCNC: 3.7 MMOL/L — SIGNIFICANT CHANGE UP (ref 3.5–5.3)
PROT SERPL-MCNC: 6.8 GM/DL — SIGNIFICANT CHANGE UP (ref 6–8.3)
PROTHROM AB SERPL-ACNC: 15.3 SEC — HIGH (ref 10–12.9)
RBC # BLD: 3.96 M/UL — SIGNIFICANT CHANGE UP (ref 3.8–5.2)
RBC # FLD: 15.4 % — HIGH (ref 10.3–14.5)
SODIUM SERPL-SCNC: 136 MMOL/L — SIGNIFICANT CHANGE UP (ref 135–145)
WBC # BLD: 8.56 K/UL — SIGNIFICANT CHANGE UP (ref 3.8–10.5)
WBC # FLD AUTO: 8.56 K/UL — SIGNIFICANT CHANGE UP (ref 3.8–10.5)

## 2018-12-21 PROCEDURE — 99233 SBSQ HOSP IP/OBS HIGH 50: CPT

## 2018-12-21 PROCEDURE — 74181 MRI ABDOMEN W/O CONTRAST: CPT | Mod: 26

## 2018-12-21 RX ADMIN — HEPARIN SODIUM 5000 UNIT(S): 5000 INJECTION INTRAVENOUS; SUBCUTANEOUS at 14:00

## 2018-12-21 RX ADMIN — Medication 4: at 11:24

## 2018-12-21 RX ADMIN — Medication 500 MILLIGRAM(S): at 05:50

## 2018-12-21 RX ADMIN — Medication 1 DROP(S): at 05:49

## 2018-12-21 RX ADMIN — Medication 2: at 07:34

## 2018-12-21 RX ADMIN — MONTELUKAST 10 MILLIGRAM(S): 4 TABLET, CHEWABLE ORAL at 11:24

## 2018-12-21 RX ADMIN — PANTOPRAZOLE SODIUM 40 MILLIGRAM(S): 20 TABLET, DELAYED RELEASE ORAL at 05:49

## 2018-12-21 RX ADMIN — Medication 500 MILLIGRAM(S): at 17:57

## 2018-12-21 RX ADMIN — PIPERACILLIN AND TAZOBACTAM 25 GRAM(S): 4; .5 INJECTION, POWDER, LYOPHILIZED, FOR SOLUTION INTRAVENOUS at 14:00

## 2018-12-21 RX ADMIN — Medication 25 MILLIGRAM(S): at 21:49

## 2018-12-21 RX ADMIN — Medication 5 MILLIGRAM(S): at 05:49

## 2018-12-21 RX ADMIN — PIPERACILLIN AND TAZOBACTAM 25 GRAM(S): 4; .5 INJECTION, POWDER, LYOPHILIZED, FOR SOLUTION INTRAVENOUS at 21:50

## 2018-12-21 RX ADMIN — PIPERACILLIN AND TAZOBACTAM 25 GRAM(S): 4; .5 INJECTION, POWDER, LYOPHILIZED, FOR SOLUTION INTRAVENOUS at 05:49

## 2018-12-21 RX ADMIN — GABAPENTIN 100 MILLIGRAM(S): 400 CAPSULE ORAL at 21:49

## 2018-12-21 RX ADMIN — Medication 25 MILLIGRAM(S): at 05:49

## 2018-12-21 RX ADMIN — Medication 1 DROP(S): at 11:25

## 2018-12-21 RX ADMIN — HEPARIN SODIUM 5000 UNIT(S): 5000 INJECTION INTRAVENOUS; SUBCUTANEOUS at 21:49

## 2018-12-21 RX ADMIN — GABAPENTIN 100 MILLIGRAM(S): 400 CAPSULE ORAL at 14:00

## 2018-12-21 RX ADMIN — Medication 25 MILLIGRAM(S): at 14:00

## 2018-12-21 RX ADMIN — SODIUM CHLORIDE 110 MILLILITER(S): 9 INJECTION INTRAMUSCULAR; INTRAVENOUS; SUBCUTANEOUS at 05:49

## 2018-12-21 RX ADMIN — GABAPENTIN 100 MILLIGRAM(S): 400 CAPSULE ORAL at 05:49

## 2018-12-21 RX ADMIN — Medication 1 DROP(S): at 16:57

## 2018-12-21 RX ADMIN — DULOXETINE HYDROCHLORIDE 60 MILLIGRAM(S): 30 CAPSULE, DELAYED RELEASE ORAL at 11:24

## 2018-12-21 RX ADMIN — Medication 500 MILLIGRAM(S): at 16:57

## 2018-12-21 RX ADMIN — HEPARIN SODIUM 5000 UNIT(S): 5000 INJECTION INTRAVENOUS; SUBCUTANEOUS at 05:50

## 2018-12-21 NOTE — PROGRESS NOTE ADULT - SUBJECTIVE AND OBJECTIVE BOX
INTERVAL HPI/OVERNIGHT EVENTS:  Pt. seen and examined at bedside resting comfortably. Daughter bedside who would like to assist with translation. Patient admits to mild epigastric pain when "pressed." Denies N/V. Denies flatus or BM.   Denies fever/chills, chest pain, dyspnea, cough, dizziness.     Vital Signs Last 24 Hrs  T(C): 36.1 (21 Dec 2018 05:55), Max: 37.1 (20 Dec 2018 17:34)  T(F): 97 (21 Dec 2018 05:55), Max: 98.7 (20 Dec 2018 17:34)  HR: 68 (21 Dec 2018 05:55) (67 - 71)  BP: 124/68 (21 Dec 2018 05:55) (103/49 - 124/68)  RR: 18 (21 Dec 2018 05:55) (18 - 18)  SpO2: 98% (21 Dec 2018 05:55) (98% - 98%)    PHYSICAL EXAM:    GENERAL: NAD  CHEST/LUNG: Clear to ausculation, bilaterally   HEART: S1S2, RRR  ABDOMEN: non distended, +BS, soft, minimally tender to RUQ, no guarding, no rebound  EXTREMITIES:  calf soft, non tender b/l. 2+ distal pulses b/l.     I&O's Detail    20 Dec 2018 07:01  -  21 Dec 2018 07:00  --------------------------------------------------------  IN:    Oral Fluid: 360 mL    sodium chloride 0.9%.: 840 mL    Solution: 200 mL  Total IN: 1400 mL    OUT:  Total OUT: 0 mL    Total NET: 1400 mL    LABS:                        9.9    8.56  )-----------( 162      ( 21 Dec 2018 06:42 )             30.1     12-21    136  |  102  |  9   ----------------------------<  214<H>  3.7   |  27  |  0.70    Ca    8.3<L>      21 Dec 2018 06:42  Phos  2.5     12-21  Mg     2.0     12-21    TPro  6.8  /  Alb  2.5<L>  /  TBili  2.5<H>  /  DBili  2.26<H>  /  AST  49<H>  /  ALT  54  /  AlkPhos  436<H>  12-21    PT/INR - ( 21 Dec 2018 06:42 )   PT: 15.3 sec;   INR: 1.35 ratio         PTT - ( 21 Dec 2018 06:42 )  PTT:42.5 sec      A/P: 53 year old female with PMH DM, HTN, a/w falls, RUQ pain, leukocytosis (resolved), found to have cholelithiasis and dilated CBD with elevated bilirubin, transaminases, now with elevated lipase.     -F/u Results of MRCP, may need GI consult  -Trend LFTs/Bili/Lipase  -NPO, IV hydration  -Cont. Zosyn  -Analgesia prn  -medical management and supportive care  -Discussed with KARINA Reed to d/c ASA 325mg, continue heparin subq for DVT ppx  -Discuss with Dr. Yee INTERVAL HPI/OVERNIGHT EVENTS:  Pt. seen and examined at bedside resting comfortably. Daughter bedside who would like to assist with translation. Patient admits to mild epigastric pain when "pressed." Denies N/V. Denies flatus or BM.   Denies fever/chills, chest pain, dyspnea, cough, dizziness.     Vital Signs Last 24 Hrs  T(C): 36.1 (21 Dec 2018 05:55), Max: 37.1 (20 Dec 2018 17:34)  T(F): 97 (21 Dec 2018 05:55), Max: 98.7 (20 Dec 2018 17:34)  HR: 68 (21 Dec 2018 05:55) (67 - 71)  BP: 124/68 (21 Dec 2018 05:55) (103/49 - 124/68)  RR: 18 (21 Dec 2018 05:55) (18 - 18)  SpO2: 98% (21 Dec 2018 05:55) (98% - 98%)    PHYSICAL EXAM:    GENERAL: NAD  CHEST/LUNG: Clear to ausculation, bilaterally   HEART: S1S2, RRR  ABDOMEN: non distended, +BS, soft, minimally tender to RUQ, no guarding, no rebound  EXTREMITIES:  calf soft, non tender b/l. 2+ distal pulses b/l.     I&O's Detail    20 Dec 2018 07:01  -  21 Dec 2018 07:00  --------------------------------------------------------  IN:    Oral Fluid: 360 mL    sodium chloride 0.9%.: 840 mL    Solution: 200 mL  Total IN: 1400 mL    OUT:  Total OUT: 0 mL    Total NET: 1400 mL    LABS:                        9.9    8.56  )-----------( 162      ( 21 Dec 2018 06:42 )             30.1     12-21    136  |  102  |  9   ----------------------------<  214<H>  3.7   |  27  |  0.70    Ca    8.3<L>      21 Dec 2018 06:42  Phos  2.5     12-21  Mg     2.0     12-21    TPro  6.8  /  Alb  2.5<L>  /  TBili  2.5<H>  /  DBili  2.26<H>  /  AST  49<H>  /  ALT  54  /  AlkPhos  436<H>  12-21    PT/INR - ( 21 Dec 2018 06:42 )   PT: 15.3 sec;   INR: 1.35 ratio         PTT - ( 21 Dec 2018 06:42 )  PTT:42.5 sec      A/P: 53 year old female with PMH DM, HTN, a/w falls, RUQ pain, leukocytosis (resolved), found to have cholelithiasis and dilated CBD with elevated bilirubin, transaminases, now with elevated lipase.     -F/U with GI for ERCP  -Trend LFTs/Bili/Lipase  -NPO, IV hydration  -Cont. Zosyn  -Analgesia prn  -medical management and supportive care  -Discussed with KARINA Reed to d/c ASA 325mg, continue heparin subq for DVT ppx  -Discuss with Dr. Yee

## 2018-12-21 NOTE — PROGRESS NOTE ADULT - SUBJECTIVE AND OBJECTIVE BOX
Subjective Complaints:  Historian:    chart    53 y o woman admitted for recurrent episodes of fall   REVIEW OF SYSTEMS:  Eyes:  Good vision, no reported pain  ENT:  No sore throat, pain, runny nose, dysphagia  CV:  No pain, palpitatioins, hypo/hypertension  Resp:  No dyspnea, cough, tachypnea, wheezing  GI:  No pain, nausea, vomiting, diarrhea, constipatiion  Muscle:  No pain, weakness  Neuro:  No weakness, tingling, memory problems  Psych:  No fatigue, insomnia, mood problems, depression  Endocrine:  No polyuria, polydypsia, cold/heat intolerance    Vital Signs Last 24 Hrs  T(C): 36.4 (21 Dec 2018 12:44), Max: 37.1 (20 Dec 2018 17:34)  T(F): 97.5 (21 Dec 2018 12:44), Max: 98.7 (20 Dec 2018 17:34)  HR: 68 (21 Dec 2018 14:00) (66 - 71)  BP: 115/76 (21 Dec 2018 14:00) (103/49 - 125/68)  BP(mean): --  RR: 18 (21 Dec 2018 12:44) (18 - 18)  SpO2: 99% (21 Dec 2018 14:00) (96% - 99%)    GENERAL PHYSICAL EXAM:  General:  Appears stated age, well-groomed, well-nourished, no distress  HEENT:  NC/AT, patent nares w/ pink mucosa, OP clear w/o lesions, PERRL, EOMI, conjunctivae clear, no thyromegaly, nodules, adenopathy, no JVD  Chest:  Full & symmetric excursion, no increased effort, breath sounds clear  Cardiovascular:  Regular rhythm, S1, S2, no murmur/rub/S3/S4, no carotid/femoral/abdominal bruit, radial/pedal pulses 2+, no edema  Abdomen:  Soft, non-tender, non-distended, normoactive bowel sounds, no HSM  Extremities:  Gait & station:   Digits:   Nails:   Joints, Bones, Muscles:   ROM:   Stability:  Skin:  No rash/erythema/ecchymoses/petechiae/wounds/abscess/warm/dry  Musculoskeletal:  Full ROM in all joints w/o swelling/tenderness/effusion    NEUROLOGICAL EXAM:  HENT:  Normocephalic head; atraumatic head.  Neck supple.  ENT: normal looking.  Mental State:    Alert.  Fully oriented to person, place and date.  Coherent.  Speech clear and intact.  Cooperative.  Responds appropriately.    Cranial Nerves:  II-XII:   Pupils round and reactive to light and accommodation.  Extraocular movements full.  Visual fields full (no homonymous hemianopsia).  Visual acuity wnl.  Facial symmetry intact.  Tongue midline.  Motor Functions:  Moves all extremities.  No pronator drift of UE.  Claps hand well.  Hand  intact bilaterally.  Ambulatory.    Sensory Functions:   Intact to touch and pinprick to face and extremities.    Reflexes:  Deep tendon reflexes normoactive to biceps, knees and ankles.  Babinski absent (present).  Cerebellar Testing:    Finger to nose intact.  Nystagmus absent.  Gait : unremarkable     LABS:                        9.9    8.56  )-----------( 162      ( 21 Dec 2018 06:42 )             30.1     12-21    136  |  102  |  9   ----------------------------<  214<H>  3.7   |  27  |  0.70    Ca    8.3<L>      21 Dec 2018 06:42  Phos  2.5     12-21  Mg     2.0     12-21    TPro  6.8  /  Alb  2.5<L>  /  TBili  2.5<H>  /  DBili  2.26<H>  /  AST  49<H>  /  ALT  54  /  AlkPhos  436<H>  12-21    PT/INR - ( 21 Dec 2018 06:42 )   PT: 15.3 sec;   INR: 1.35 ratio         PTT - ( 21 Dec 2018 06:42 )  PTT:42.5 sec        RADIOLOGY & ADDITIONAL STUDIES:    Diet, Consistent Carbohydrate/No Snacks:   Halal  Supplement Feeding Modality:  Oral  Glucerna Shake Cans or Servings Per Day:  1       Frequency:  Two Times a day (12-20 @ 15:18)  Complete Blood Count: AM Sched. Collection: 21-Dec-2018 05:00 (12-20 @ 16:09)  Comprehensive Metabolic Panel: AM Sched. Collection: 21-Dec-2018 05:00 (12-20 @ 16:09)  POCT  Blood Glucose: 17:35 (12-20 @ 17:36)  Lipase, Serum: AM Sched. Collection: 21-Dec-2018 05:00 (12-20 @ 18:07)  piperacillin/tazobactam IVPB.: [Known as ZOSYN IVPB.]  3.375 Gram(s) in dextrose 5% 100 milliLiter(s), IV Intermittent, every 8 hours, infuse over 4 Hour(s)  Indication: cholecystitis  Special Instructions: For Creatinine Clearance GREATER THAN 20 mL/min  Administration Instructions: 4 HOUR INFUSE (12-20 @ 18:20)  Diet, NPO after Midnight:      NPO Start Date: 20-Dec-2018,   NPO Start Time: 23:59  Except Medications (12-20 @ 18:21)  sodium chloride 0.9%.: Solution, 1000 milliLiter(s) infuse at 110 mL/Hr  Special Instructions: while NPO  Provider's Contact #: [phone] (12-20 @ 18:22)  Diet, NPO after Midnight:      NPO Start Date: 20-Dec-2018,   NPO Start Time: 23:59  Except Medications (12-20 @ 18:37)  Hepatic Function Panel: AM Sched. Collection: 21-Dec-2018 05:00  Cancel Reason: Duplicate Order (12-20 @ 20:09)  Magnesium, Serum: AM Sched. Collection: 21-Dec-2018 05:00 (12-20 @ 20:09)  Phosphorus Level, Serum: AM Sched. Collection: 21-Dec-2018 05:00 (12-20 @ 20:09)  Activated Partial Thromboplastin Time:  Start Date:21-Dec-2018. AM Sched. Collection:21-Dec-2018 05:00 (12-20 @ 20:12)  Prothrombin Time and INR, Plasma:  Start Date:21-Dec-2018. AM Sched. Collection:21-Dec-2018 05:00 (12-20 @ 20:12)  Type + Screen: AM  Sched. Collection:21-Dec-2018 05:00 (12-20 @ 20:12)  POCT  Blood Glucose: 21:44 (12-20 @ 21:45)  Bilirubin Direct, Serum: 06:30 (12-21 @ 06:42)  Indirect Reacting Bilirubin: 06:30 (12-21 @ 06:42)  Antibody Screen: 06:30 (12-21 @ 06:42)  POCT  Blood Glucose: 07:29 (12-21 @ 07:31)  Complete Blood Count: AM Sched. Collection: 22-Dec-2018 05:00 (12-21 @ 08:28)  Basic Metabolic Panel: AM Sched. Collection: 22-Dec-2018 05:00 (12-21 @ 08:28)  Magnesium, Serum: AM Sched. Collection: 22-Dec-2018 05:00 (12-21 @ 08:28)  Phosphorus Level, Serum: AM Sched. Collection: 22-Dec-2018 05:00 (12-21 @ 08:28)  Hepatic Function Panel: AM Sched. Collection: 22-Dec-2018 05:00 (12-21 @ 08:28)  Lipase, Serum: AM Sched. Collection: 22-Dec-2018 05:00 (12-21 @ 08:28)  POCT  Blood Glucose: 11:21 (12-21 @ 11:23)  DX Ataxia ( ofunknow etiology )     Recommendations:  PT   DVT prophylaxis as ordered.  Medications:

## 2018-12-21 NOTE — CHART NOTE - NSCHARTNOTEFT_GEN_A_CORE
MRCP results discussed with Dr. Yee--  Recommend ERCP per GI    < from: MR MRCP No Cont (12.21.18 @ 09:40) >    EXAM:  MR MRCP                            PROCEDURE DATE:  12/21/2018      INTERPRETATION:  MRCP without IV contrast    Indication: History of cholelithiasis. Dilated common bile duct.    Technique: Utilizing a 1.5 Jossy high-field magnet, multiplanar   multisequence MR images of the abdomen are acquired without IV contrast,   supplemented by thin and thick slab MRCP images.    Comparison: No prior abdominal MR is available for comparison.    Findings: The common bile duct is dilated at 1.5cm in diameter. There is   an abrupt transition at the very distal common bile duct. This may be due   to presence of a common bile duct stone or cholangiocarcinoma. The main   pancreatic duct is mildly dilated as well measuring up to 4.7 mm at the   pancreatic head.    Gallstones in the gallbladder. No evidence for gallbladder wall   thickening. Trace pericholecystic fluid is noted adjacent to the   gallbladder neck.    Slight nodular contour of the liver may represent cirrhosis. No gross   focal hepatic mass lesion is identified on this noncontrast examination.    A few brightly T2 hyperintense lesions are seen in the pancreas measuring   up to 5.5 mm in the pancreatic tail. These lesions may be cystic.    Allowing for noncontrast technique, the spleen, adrenals and kidneys   appear grossly unremarkable. Extrarenal pelvises bilaterally.    No evidence for ascites, or grossly enlarged lymph node.    Impression: Dilated common bile duct with an abrupt transition at the   very distal common bile duct. This may be due to presence of a common   bile duct stone or cholangiocarcinoma. The main pancreatic duct is mildly   dilated as well. If clinically indicated, ERCP and endoscopic ultrasound   may be pursued for further evaluation.     Cholelithiasis with trace pericholecystic fluid. Possibility of acute   cholecystitis is considered. Clinical correlation is recommended. If   clinically indicated, HIDA scan may be pursued for further evaluation.    A few small brightly T2 hyperintenselesions in the pancreas may   represent IPMNs or nonspecific cystic neoplasms of pancreas. If   clinically indicated, follow-up abdominal MR may be pursued in 6-12   months to ensure stability.    < end of copied text >

## 2018-12-21 NOTE — PROGRESS NOTE ADULT - SUBJECTIVE AND OBJECTIVE BOX
Patient is a 53y old  Female who presents with a chief complaint of R/o CVA (18 Dec 2018 22:54)      INTERVAL HPI/OVERNIGHT EVENTS: still has mild RUQ pain today, but mild. remains afebrile. no n.v.     Med      Allergies    No Known Allergies    Intolerances        REVIEW OF SYSTEMS:  CONSTITUTIONAL: + fatigue  EYES: No eye pain, visual disturbances, or discharge  ENMT:  No difficulty hearing, tinnitus, vertigo; No sinus or throat pain  RESPIRATORY: No cough, wheezing, chills or hemoptysis; No shortness of breath  CARDIOVASCULAR: No chest pain, palpitations, dizziness, or leg swelling  GASTROINTESTINAL: abdominal pain. No nausea, vomiting, or hematemesis; No diarrhea or constipation. No melena or hematochezia.  GENITOURINARY: No dysuria, frequency, hematuria, or incontinence  NEUROLOGICAL: No headaches, memory loss, loss of strength, numbness, or tremors  SKIN: No itching, burning, rashes, or lesions   LYMPH NODES: No enlarged glands  ENDOCRINE: No heat or cold intolerance; No hair loss  MUSCULOSKELETAL: back pain  PSYCHIATRIC: No depression, anxiety, mood swings, or difficulty sleeping  HEME/LYMPH: No easy bruising, or bleeding gums  ALLERGY AND IMMUNOLOGIC: No hives or eczema    Vital Signs Last 24 Hrs  T(C): 36.4 (21 Dec 2018 12:44), Max: 36.9 (21 Dec 2018 00:36)  T(F): 97.5 (21 Dec 2018 12:44), Max: 98.4 (21 Dec 2018 00:36)  HR: 68 (21 Dec 2018 14:00) (66 - 71)  BP: 115/76 (21 Dec 2018 14:00) (103/49 - 125/68)  BP(mean): --  RR: 18 (21 Dec 2018 12:44) (18 - 18)  SpO2: 99% (21 Dec 2018 14:00) (96% - 99%)    PHYSICAL EXAM:  GENERAL: NAD, well-groomed, well-developed  HEAD:  Atraumatic, Normocephalic  EYES: EOMI, PERRLA, conjunctiva and sclera clear  ENMT: No tonsillar erythema, exudates, or enlargement; Moist mucous membranes, Good dentition, No lesions  NECK: Supple, No JVD, Normal thyroid  NERVOUS SYSTEM:  Alert & Oriented X3, Good concentration; Motor Strength 5/5 B/L upper and lower extremities; DTRs 2+ intact and symmetric  CHEST/LUNG: Clear to percussion bilaterally; No rales, rhonchi, wheezing, or rubs  HEART: Regular rate and rhythm; No murmurs, rubs, or gallops  ABDOMEN: Soft, RUQ tenderness. no r/g. Nondistended; Bowel sounds present  EXTREMITIES:  2+ Peripheral Pulses, No clubbing, cyanosis, or edema  LYMPH: No lymphadenopathy noted  SKIN: No rashes or lesions    LABS:                                          9.9    8.56  )-----------( 162      ( 21 Dec 2018 06:42 )             30.1     -    136  |  102  |  9   ----------------------------<  214<H>  3.7   |  27  |  0.70    Ca    8.3<L>      21 Dec 2018 06:42  Phos  2.5       Mg     2.0         TPro  6.8  /  Alb  2.5<L>  /  TBili  2.5<H>  /  DBili  2.26<H>  /  AST  49<H>  /  ALT  54  /  AlkPhos  436<H>      PT/INR - ( 21 Dec 2018 06:42 )   PT: 15.3 sec;   INR: 1.35 ratio         PTT - ( 21 Dec 2018 06:42 )  PTT:42.5 sec    Color: Yellow / Appearance: Clear / S.005 / pH: x  Gluc: x / Ketone: Negative  / Bili: Negative / Urobili: Negative mg/dL   Blood: x / Protein: Negative mg/dL / Nitrite: Negative   Leuk Esterase: Trace / RBC: x / WBC 6-10   Sq Epi: x / Non Sq Epi: Moderate / Bacteria: x            CAPILLARY BLOOD GLUCOSE        RADIOLOGY & ADDITIONAL TESTS:      Imaging Per< from: MR MRCP No Cont (18 @ 09:40) >  mpression: Dilated common bile duct with an abrupt transition at the   very distal common bile duct. This may be due to presence of a common   bile duct stone or cholangiocarcinoma. The main pancreatic duct is mildly   dilated as well. If clinically indicated, ERCP and endoscopic ultrasound   may be pursued for further evaluation.     Cholelithiasis with trace pericholecystic fluid. Possibility of acute   cholecystitis is considered. Clinical correlation is recommended. If   clinically indicated, HIDA scan may be pursued for further evaluation.    A few small brightly T2 hyperintenselesions in the pancreas may   represent IPMNs or nonspecific cystic neoplasms of pancreas. If   clinically indicated, follow-up abdominal MR may be pursued in 6-12   months to ensure stability.    < end of copied text >  sonally Reviewed:  [ ] YES  [ ] NO    Consultant(s) Notes Reviewed:  [ ] YES  [ ] NO    Care Discussed with Consultants/Other Providers [ ] YES  [ ] NO

## 2018-12-22 LAB
ALBUMIN SERPL ELPH-MCNC: 2.5 G/DL — LOW (ref 3.3–5)
ALP SERPL-CCNC: 443 U/L — HIGH (ref 40–120)
ALT FLD-CCNC: 51 U/L — SIGNIFICANT CHANGE UP (ref 12–78)
ANION GAP SERPL CALC-SCNC: 8 MMOL/L — SIGNIFICANT CHANGE UP (ref 5–17)
AST SERPL-CCNC: 47 U/L — HIGH (ref 15–37)
BILIRUB DIRECT SERPL-MCNC: 1.6 MG/DL — HIGH (ref 0.05–0.2)
BILIRUB INDIRECT FLD-MCNC: 0.4 MG/DL — SIGNIFICANT CHANGE UP (ref 0.2–1)
BILIRUB SERPL-MCNC: 2 MG/DL — HIGH (ref 0.2–1.2)
BUN SERPL-MCNC: 8 MG/DL — SIGNIFICANT CHANGE UP (ref 7–23)
CALCIUM SERPL-MCNC: 8.8 MG/DL — SIGNIFICANT CHANGE UP (ref 8.5–10.1)
CHLORIDE SERPL-SCNC: 105 MMOL/L — SIGNIFICANT CHANGE UP (ref 96–108)
CO2 SERPL-SCNC: 27 MMOL/L — SIGNIFICANT CHANGE UP (ref 22–31)
CREAT SERPL-MCNC: 0.68 MG/DL — SIGNIFICANT CHANGE UP (ref 0.5–1.3)
GLUCOSE BLDC GLUCOMTR-MCNC: 100 MG/DL — HIGH (ref 70–99)
GLUCOSE BLDC GLUCOMTR-MCNC: 161 MG/DL — HIGH (ref 70–99)
GLUCOSE BLDC GLUCOMTR-MCNC: 195 MG/DL — HIGH (ref 70–99)
GLUCOSE SERPL-MCNC: 159 MG/DL — HIGH (ref 70–99)
HCT VFR BLD CALC: 29.6 % — LOW (ref 34.5–45)
HGB BLD-MCNC: 9.8 G/DL — LOW (ref 11.5–15.5)
LIDOCAIN IGE QN: 541 U/L — HIGH (ref 73–393)
MAGNESIUM SERPL-MCNC: 2 MG/DL — SIGNIFICANT CHANGE UP (ref 1.6–2.6)
MCHC RBC-ENTMCNC: 25.2 PG — LOW (ref 27–34)
MCHC RBC-ENTMCNC: 33.1 GM/DL — SIGNIFICANT CHANGE UP (ref 32–36)
MCV RBC AUTO: 76.1 FL — LOW (ref 80–100)
NRBC # BLD: 0 /100 WBCS — SIGNIFICANT CHANGE UP (ref 0–0)
PHOSPHATE SERPL-MCNC: 2.9 MG/DL — SIGNIFICANT CHANGE UP (ref 2.5–4.5)
PLATELET # BLD AUTO: 197 K/UL — SIGNIFICANT CHANGE UP (ref 150–400)
POTASSIUM SERPL-MCNC: 4 MMOL/L — SIGNIFICANT CHANGE UP (ref 3.5–5.3)
POTASSIUM SERPL-SCNC: 4 MMOL/L — SIGNIFICANT CHANGE UP (ref 3.5–5.3)
PROT SERPL-MCNC: 7 GM/DL — SIGNIFICANT CHANGE UP (ref 6–8.3)
RBC # BLD: 3.89 M/UL — SIGNIFICANT CHANGE UP (ref 3.8–5.2)
RBC # FLD: 15.8 % — HIGH (ref 10.3–14.5)
SODIUM SERPL-SCNC: 140 MMOL/L — SIGNIFICANT CHANGE UP (ref 135–145)
WBC # BLD: 9.12 K/UL — SIGNIFICANT CHANGE UP (ref 3.8–10.5)
WBC # FLD AUTO: 9.12 K/UL — SIGNIFICANT CHANGE UP (ref 3.8–10.5)

## 2018-12-22 PROCEDURE — 99233 SBSQ HOSP IP/OBS HIGH 50: CPT

## 2018-12-22 RX ORDER — ACETAMINOPHEN 500 MG
1000 TABLET ORAL ONCE
Qty: 0 | Refills: 0 | Status: COMPLETED | OUTPATIENT
Start: 2018-12-22 | End: 2018-12-22

## 2018-12-22 RX ORDER — DEXTROSE 50 % IN WATER 50 %
25 SYRINGE (ML) INTRAVENOUS ONCE
Qty: 0 | Refills: 0 | Status: DISCONTINUED | OUTPATIENT
Start: 2018-12-22 | End: 2018-12-24

## 2018-12-22 RX ORDER — DEXTROSE 50 % IN WATER 50 %
15 SYRINGE (ML) INTRAVENOUS ONCE
Qty: 0 | Refills: 0 | Status: DISCONTINUED | OUTPATIENT
Start: 2018-12-22 | End: 2018-12-24

## 2018-12-22 RX ORDER — HEPARIN SODIUM 5000 [USP'U]/ML
5000 INJECTION INTRAVENOUS; SUBCUTANEOUS EVERY 8 HOURS
Qty: 0 | Refills: 0 | Status: DISCONTINUED | OUTPATIENT
Start: 2018-12-22 | End: 2018-12-24

## 2018-12-22 RX ORDER — ONDANSETRON 8 MG/1
4 TABLET, FILM COATED ORAL ONCE
Qty: 0 | Refills: 0 | Status: DISCONTINUED | OUTPATIENT
Start: 2018-12-22 | End: 2018-12-23

## 2018-12-22 RX ORDER — FENTANYL CITRATE 50 UG/ML
25 INJECTION INTRAVENOUS
Qty: 0 | Refills: 0 | Status: DISCONTINUED | OUTPATIENT
Start: 2018-12-22 | End: 2018-12-23

## 2018-12-22 RX ORDER — SODIUM CHLORIDE 9 MG/ML
1000 INJECTION, SOLUTION INTRAVENOUS
Qty: 0 | Refills: 0 | Status: DISCONTINUED | OUTPATIENT
Start: 2018-12-22 | End: 2018-12-23

## 2018-12-22 RX ORDER — PIPERACILLIN AND TAZOBACTAM 4; .5 G/20ML; G/20ML
3.38 INJECTION, POWDER, LYOPHILIZED, FOR SOLUTION INTRAVENOUS EVERY 8 HOURS
Qty: 0 | Refills: 0 | Status: DISCONTINUED | OUTPATIENT
Start: 2018-12-22 | End: 2018-12-24

## 2018-12-22 RX ORDER — DEXTROSE 50 % IN WATER 50 %
12.5 SYRINGE (ML) INTRAVENOUS ONCE
Qty: 0 | Refills: 0 | Status: DISCONTINUED | OUTPATIENT
Start: 2018-12-22 | End: 2018-12-24

## 2018-12-22 RX ORDER — OXYCODONE AND ACETAMINOPHEN 5; 325 MG/1; MG/1
1 TABLET ORAL EVERY 6 HOURS
Qty: 0 | Refills: 0 | Status: DISCONTINUED | OUTPATIENT
Start: 2018-12-22 | End: 2018-12-24

## 2018-12-22 RX ORDER — GABAPENTIN 400 MG/1
100 CAPSULE ORAL THREE TIMES A DAY
Qty: 0 | Refills: 0 | Status: DISCONTINUED | OUTPATIENT
Start: 2018-12-22 | End: 2018-12-24

## 2018-12-22 RX ORDER — PANTOPRAZOLE SODIUM 20 MG/1
40 TABLET, DELAYED RELEASE ORAL
Qty: 0 | Refills: 0 | Status: DISCONTINUED | OUTPATIENT
Start: 2018-12-22 | End: 2018-12-24

## 2018-12-22 RX ORDER — INSULIN GLARGINE 100 [IU]/ML
10 INJECTION, SOLUTION SUBCUTANEOUS AT BEDTIME
Qty: 0 | Refills: 0 | Status: DISCONTINUED | OUTPATIENT
Start: 2018-12-22 | End: 2018-12-24

## 2018-12-22 RX ORDER — INSULIN LISPRO 100/ML
VIAL (ML) SUBCUTANEOUS AT BEDTIME
Qty: 0 | Refills: 0 | Status: DISCONTINUED | OUTPATIENT
Start: 2018-12-22 | End: 2018-12-24

## 2018-12-22 RX ORDER — HYDROMORPHONE HYDROCHLORIDE 2 MG/ML
0.5 INJECTION INTRAMUSCULAR; INTRAVENOUS; SUBCUTANEOUS
Qty: 0 | Refills: 0 | Status: DISCONTINUED | OUTPATIENT
Start: 2018-12-22 | End: 2018-12-23

## 2018-12-22 RX ORDER — MONTELUKAST 4 MG/1
10 TABLET, CHEWABLE ORAL DAILY
Qty: 0 | Refills: 0 | Status: DISCONTINUED | OUTPATIENT
Start: 2018-12-22 | End: 2018-12-24

## 2018-12-22 RX ORDER — MECLIZINE HCL 12.5 MG
25 TABLET ORAL EVERY 8 HOURS
Qty: 0 | Refills: 0 | Status: DISCONTINUED | OUTPATIENT
Start: 2018-12-22 | End: 2018-12-24

## 2018-12-22 RX ORDER — SODIUM CHLORIDE 9 MG/ML
1000 INJECTION, SOLUTION INTRAVENOUS
Qty: 0 | Refills: 0 | Status: DISCONTINUED | OUTPATIENT
Start: 2018-12-22 | End: 2018-12-24

## 2018-12-22 RX ORDER — GLUCAGON INJECTION, SOLUTION 0.5 MG/.1ML
1 INJECTION, SOLUTION SUBCUTANEOUS ONCE
Qty: 0 | Refills: 0 | Status: DISCONTINUED | OUTPATIENT
Start: 2018-12-22 | End: 2018-12-24

## 2018-12-22 RX ORDER — DULOXETINE HYDROCHLORIDE 30 MG/1
60 CAPSULE, DELAYED RELEASE ORAL DAILY
Qty: 0 | Refills: 0 | Status: DISCONTINUED | OUTPATIENT
Start: 2018-12-22 | End: 2018-12-24

## 2018-12-22 RX ORDER — INSULIN LISPRO 100/ML
VIAL (ML) SUBCUTANEOUS
Qty: 0 | Refills: 0 | Status: DISCONTINUED | OUTPATIENT
Start: 2018-12-22 | End: 2018-12-24

## 2018-12-22 RX ORDER — SODIUM CHLORIDE 9 MG/ML
1000 INJECTION INTRAMUSCULAR; INTRAVENOUS; SUBCUTANEOUS
Qty: 0 | Refills: 0 | Status: DISCONTINUED | OUTPATIENT
Start: 2018-12-22 | End: 2018-12-24

## 2018-12-22 RX ADMIN — Medication 25 MILLIGRAM(S): at 11:46

## 2018-12-22 RX ADMIN — PIPERACILLIN AND TAZOBACTAM 25 GRAM(S): 4; .5 INJECTION, POWDER, LYOPHILIZED, FOR SOLUTION INTRAVENOUS at 15:48

## 2018-12-22 RX ADMIN — Medication 1 DROP(S): at 00:54

## 2018-12-22 RX ADMIN — MONTELUKAST 10 MILLIGRAM(S): 4 TABLET, CHEWABLE ORAL at 11:46

## 2018-12-22 RX ADMIN — PIPERACILLIN AND TAZOBACTAM 25 GRAM(S): 4; .5 INJECTION, POWDER, LYOPHILIZED, FOR SOLUTION INTRAVENOUS at 06:25

## 2018-12-22 RX ADMIN — Medication 5 MILLIGRAM(S): at 06:25

## 2018-12-22 RX ADMIN — Medication 1 DROP(S): at 17:29

## 2018-12-22 RX ADMIN — Medication 500 MILLIGRAM(S): at 19:01

## 2018-12-22 RX ADMIN — Medication 500 MILLIGRAM(S): at 06:29

## 2018-12-22 RX ADMIN — PANTOPRAZOLE SODIUM 40 MILLIGRAM(S): 20 TABLET, DELAYED RELEASE ORAL at 06:25

## 2018-12-22 RX ADMIN — SODIUM CHLORIDE 100 MILLILITER(S): 9 INJECTION, SOLUTION INTRAVENOUS at 22:31

## 2018-12-22 RX ADMIN — Medication 1 DROP(S): at 11:46

## 2018-12-22 RX ADMIN — DULOXETINE HYDROCHLORIDE 60 MILLIGRAM(S): 30 CAPSULE, DELAYED RELEASE ORAL at 11:46

## 2018-12-22 RX ADMIN — Medication 400 MILLIGRAM(S): at 22:47

## 2018-12-22 RX ADMIN — GABAPENTIN 100 MILLIGRAM(S): 400 CAPSULE ORAL at 11:46

## 2018-12-22 RX ADMIN — Medication 500 MILLIGRAM(S): at 07:30

## 2018-12-22 RX ADMIN — Medication 2: at 11:44

## 2018-12-22 RX ADMIN — Medication 1 DROP(S): at 06:54

## 2018-12-22 RX ADMIN — Medication 25 MILLIGRAM(S): at 06:25

## 2018-12-22 RX ADMIN — Medication 500 MILLIGRAM(S): at 17:29

## 2018-12-22 RX ADMIN — SODIUM CHLORIDE 110 MILLILITER(S): 9 INJECTION INTRAMUSCULAR; INTRAVENOUS; SUBCUTANEOUS at 15:45

## 2018-12-22 RX ADMIN — GABAPENTIN 100 MILLIGRAM(S): 400 CAPSULE ORAL at 06:25

## 2018-12-22 NOTE — PROGRESS NOTE ADULT - SUBJECTIVE AND OBJECTIVE BOX
INTERVAL HPI/OVERNIGHT EVENTS: Pain slightly improved. Daughter at bedside to help with communication. No N/V. has been NPO + meds since MN.    Vital Signs Last 24 Hrs  T(C): 36.8 (22 Dec 2018 05:25), Max: 36.9 (21 Dec 2018 18:02)  T(F): 98.2 (22 Dec 2018 05:25), Max: 98.4 (21 Dec 2018 18:02)  HR: 67 (22 Dec 2018 05:25) (66 - 71)  BP: 117/67 (22 Dec 2018 05:25) (115/76 - 135/72)  BP(mean): --  RR: 18 (22 Dec 2018 05:25) (18 - 18)  SpO2: 99% (22 Dec 2018 05:25) (96% - 99%)    MEDICATIONS  (STANDING):  artificial  tears Solution 1 Drop(s) Both EYES four times a day  dextrose 5%. 1000 milliLiter(s) (50 mL/Hr) IV Continuous <Continuous>  dextrose 50% Injectable 12.5 Gram(s) IV Push once  dextrose 50% Injectable 25 Gram(s) IV Push once  dextrose 50% Injectable 25 Gram(s) IV Push once  DULoxetine 60 milliGRAM(s) Oral daily  gabapentin 100 milliGRAM(s) Oral three times a day  heparin  Injectable 5000 Unit(s) SubCutaneous every 8 hours  insulin glargine Injectable (LANTUS) 10 Unit(s) SubCutaneous at bedtime  insulin lispro (HumaLOG) corrective regimen sliding scale   SubCutaneous three times a day before meals  insulin lispro (HumaLOG) corrective regimen sliding scale   SubCutaneous at bedtime  meclizine 25 milliGRAM(s) Oral every 8 hours  montelukast 10 milliGRAM(s) Oral daily  naproxen 500 milliGRAM(s) Oral two times a day  pantoprazole    Tablet 40 milliGRAM(s) Oral before breakfast  piperacillin/tazobactam IVPB. 3.375 Gram(s) IV Intermittent every 8 hours  predniSONE   Tablet 5 milliGRAM(s) Oral daily  sodium chloride 0.9%. 1000 milliLiter(s) (110 mL/Hr) IV Continuous <Continuous>    MEDICATIONS  (PRN):  dextrose 40% Gel 15 Gram(s) Oral once PRN Blood Glucose LESS THAN 70 milliGRAM(s)/deciliter  glucagon  Injectable 1 milliGRAM(s) IntraMuscular once PRN Glucose LESS THAN 70 milligrams/deciliter  oxyCODONE    5 mG/acetaminophen 325 mG 1 Tablet(s) Oral every 6 hours PRN Moderate Pain (4 - 6)      PHYSICAL EXAM    GENERAL: AAO in NAD  CHEST/LUNG: CTAB  HEART: RRR  ABDOMEN: Softly distended, minimally TTP in RUQ. No rebound/guarding. Lower abdominal bruising 2/2 SQH injections.    I&O:  I&O's Detail    20 Dec 2018 07:01  -  21 Dec 2018 07:00  --------------------------------------------------------  IN:    Oral Fluid: 360 mL    sodium chloride 0.9%.: 840 mL    Solution: 200 mL  Total IN: 1400 mL    OUT:  Total OUT: 0 mL    Total NET: 1400 mL          LABS:                        9.9    8.56  )-----------( 162      ( 21 Dec 2018 06:42 )             30.1     12-21    136  |  102  |  9   ----------------------------<  214<H>  3.7   |  27  |  0.70    Ca    8.3<L>      21 Dec 2018 06:42  Phos  2.5     12-21  Mg     2.0     12-21    TPro  6.8  /  Alb  2.5<L>  /  TBili  2.5<H>  /  DBili  2.26<H>  /  AST  49<H>  /  ALT  54  /  AlkPhos  436<H>  12-21    PT/INR - ( 21 Dec 2018 06:42 )   PT: 15.3 sec;   INR: 1.35 ratio         PTT - ( 21 Dec 2018 06:42 )  PTT:42.5 sec      Impression: 53F with distal CBD obstruction of unknown etiology, hemodynamically stable.    Plan:  Pain control  NPO  ERCP today  Surgical planning to follow.

## 2018-12-22 NOTE — PROGRESS NOTE ADULT - SUBJECTIVE AND OBJECTIVE BOX
Patient is a 53y old  Female who presents with a chief complaint of R/o CVA (18 Dec 2018 22:54)      INTERVAL HPI/OVERNIGHT EVENTS: improved mild RUQ pain today. remains afebrile. no n.v.     Med      Allergies    No Known Allergies    Intolerances        REVIEW OF SYSTEMS:  CONSTITUTIONAL: + fatigue  EYES: No eye pain, visual disturbances, or discharge  ENMT:  No difficulty hearing, tinnitus, vertigo; No sinus or throat pain  RESPIRATORY: No cough, wheezing, chills or hemoptysis; No shortness of breath  CARDIOVASCULAR: No chest pain, palpitations, dizziness, or leg swelling  GASTROINTESTINAL: abdominal pain. No nausea, vomiting, or hematemesis; No diarrhea or constipation. No melena or hematochezia.  GENITOURINARY: No dysuria, frequency, hematuria, or incontinence  NEUROLOGICAL: No headaches, memory loss, loss of strength, numbness, or tremors  SKIN: No itching, burning, rashes, or lesions   LYMPH NODES: No enlarged glands  ENDOCRINE: No heat or cold intolerance; No hair loss  MUSCULOSKELETAL: back pain  PSYCHIATRIC: No depression, anxiety, mood swings, or difficulty sleeping  HEME/LYMPH: No easy bruising, or bleeding gums  ALLERGY AND IMMUNOLOGIC: No hives or eczema    Vital Signs Last 24 Hrs  T(C): 36.8 (22 Dec 2018 05:25), Max: 36.9 (21 Dec 2018 18:02)  T(F): 98.2 (22 Dec 2018 05:25), Max: 98.4 (21 Dec 2018 18:02)  HR: 67 (22 Dec 2018 05:25) (66 - 71)  BP: 117/67 (22 Dec 2018 05:25) (115/76 - 135/72)  BP(mean): --  RR: 18 (22 Dec 2018 05:25) (18 - 18)  SpO2: 99% (22 Dec 2018 05:25) (96% - 99%)    PHYSICAL EXAM:  GENERAL: NAD, well-groomed, well-developed  HEAD:  Atraumatic, Normocephalic  EYES: EOMI, PERRLA, conjunctiva and sclera clear  ENMT: No tonsillar erythema, exudates, or enlargement; Moist mucous membranes, Good dentition, No lesions  NECK: Supple, No JVD, Normal thyroid  NERVOUS SYSTEM:  Alert & Oriented X3, Good concentration; Motor Strength 5/5 B/L upper and lower extremities; DTRs 2+ intact and symmetric  CHEST/LUNG: Clear to percussion bilaterally; No rales, rhonchi, wheezing, or rubs  HEART: Regular rate and rhythm; No murmurs, rubs, or gallops  ABDOMEN: Soft, RUQ tenderness. no r/g. Nondistended; Bowel sounds present  EXTREMITIES:  2+ Peripheral Pulses, No clubbing, cyanosis, or edema  LYMPH: No lymphadenopathy noted  SKIN: No rashes or lesions    LABS:                                           9.8    9.12  )-----------( 197      ( 22 Dec 2018 07:05 )             29.6         140  |  105  |  8   ----------------------------<  159<H>  4.0   |  27  |  0.68    Ca    8.8      22 Dec 2018 07:05  Phos  2.9       Mg     2.0         TPro  7.0  /  Alb  2.5<L>  /  TBili  2.0<H>  /  DBili  1.60<H>  /  AST  47<H>  /  ALT  51  /  AlkPhos  443<H>      PT/INR - ( 21 Dec 2018 06:42 )   PT: 15.3 sec;   INR: 1.35 ratio         PTT - ( 21 Dec 2018 06:42 )  PTT:42.5 sec    Color: Yellow / Appearance: Clear / S.005 / pH: x  Gluc: x / Ketone: Negative  / Bili: Negative / Urobili: Negative mg/dL   Blood: x / Protein: Negative mg/dL / Nitrite: Negative   Leuk Esterase: Trace / RBC: x / WBC 6-10   Sq Epi: x / Non Sq Epi: Moderate / Bacteria: x            CAPILLARY BLOOD GLUCOSE        RADIOLOGY & ADDITIONAL TESTS:      Imaging Per< from: MR MRCP No Cont (18 @ 09:40) >  mpression: Dilated common bile duct with an abrupt transition at the   very distal common bile duct. This may be due to presence of a common   bile duct stone or cholangiocarcinoma. The main pancreatic duct is mildly   dilated as well. If clinically indicated, ERCP and endoscopic ultrasound   may be pursued for further evaluation.     Cholelithiasis with trace pericholecystic fluid. Possibility of acute   cholecystitis is considered. Clinical correlation is recommended. If   clinically indicated, HIDA scan may be pursued for further evaluation.    A few small brightly T2 hyperintenselesions in the pancreas may   represent IPMNs or nonspecific cystic neoplasms of pancreas. If   clinically indicated, follow-up abdominal MR may be pursued in 6-12   months to ensure stability.    < end of copied text >  sonally Reviewed:  [ ] YES  [ ] NO    Consultant(s) Notes Reviewed:  [ ] YES  [ ] NO    Care Discussed with Consultants/Other Providers [ ] YES  [ ] NO

## 2018-12-23 LAB
AFP-TM SERPL-MCNC: 5.3 NG/ML — SIGNIFICANT CHANGE UP
ALBUMIN SERPL ELPH-MCNC: 2.4 G/DL — LOW (ref 3.3–5)
ALP SERPL-CCNC: 415 U/L — HIGH (ref 40–120)
ALT FLD-CCNC: 45 U/L — SIGNIFICANT CHANGE UP (ref 12–78)
ANION GAP SERPL CALC-SCNC: 10 MMOL/L — SIGNIFICANT CHANGE UP (ref 5–17)
AST SERPL-CCNC: 44 U/L — HIGH (ref 15–37)
BILIRUB SERPL-MCNC: 1.4 MG/DL — HIGH (ref 0.2–1.2)
BUN SERPL-MCNC: 9 MG/DL — SIGNIFICANT CHANGE UP (ref 7–23)
CALCIUM SERPL-MCNC: 8.9 MG/DL — SIGNIFICANT CHANGE UP (ref 8.5–10.1)
CEA SERPL-MCNC: 1.9 NG/ML — SIGNIFICANT CHANGE UP (ref 0–3.8)
CHLORIDE SERPL-SCNC: 103 MMOL/L — SIGNIFICANT CHANGE UP (ref 96–108)
CO2 SERPL-SCNC: 25 MMOL/L — SIGNIFICANT CHANGE UP (ref 22–31)
CREAT SERPL-MCNC: 0.64 MG/DL — SIGNIFICANT CHANGE UP (ref 0.5–1.3)
GLUCOSE BLDC GLUCOMTR-MCNC: 158 MG/DL — HIGH (ref 70–99)
GLUCOSE BLDC GLUCOMTR-MCNC: 190 MG/DL — HIGH (ref 70–99)
GLUCOSE BLDC GLUCOMTR-MCNC: 215 MG/DL — HIGH (ref 70–99)
GLUCOSE BLDC GLUCOMTR-MCNC: 254 MG/DL — HIGH (ref 70–99)
GLUCOSE BLDC GLUCOMTR-MCNC: 94 MG/DL — SIGNIFICANT CHANGE UP (ref 70–99)
GLUCOSE SERPL-MCNC: 107 MG/DL — HIGH (ref 70–99)
HCT VFR BLD CALC: 30.9 % — LOW (ref 34.5–45)
HGB BLD-MCNC: 10 G/DL — LOW (ref 11.5–15.5)
LIDOCAIN IGE QN: 368 U/L — SIGNIFICANT CHANGE UP (ref 73–393)
MCHC RBC-ENTMCNC: 24.5 PG — LOW (ref 27–34)
MCHC RBC-ENTMCNC: 32.4 GM/DL — SIGNIFICANT CHANGE UP (ref 32–36)
MCV RBC AUTO: 75.7 FL — LOW (ref 80–100)
NRBC # BLD: 0 /100 WBCS — SIGNIFICANT CHANGE UP (ref 0–0)
PLATELET # BLD AUTO: 200 K/UL — SIGNIFICANT CHANGE UP (ref 150–400)
POTASSIUM SERPL-MCNC: 3.7 MMOL/L — SIGNIFICANT CHANGE UP (ref 3.5–5.3)
POTASSIUM SERPL-SCNC: 3.7 MMOL/L — SIGNIFICANT CHANGE UP (ref 3.5–5.3)
PROT SERPL-MCNC: 6.9 GM/DL — SIGNIFICANT CHANGE UP (ref 6–8.3)
RBC # BLD: 4.08 M/UL — SIGNIFICANT CHANGE UP (ref 3.8–5.2)
RBC # FLD: 15.9 % — HIGH (ref 10.3–14.5)
SODIUM SERPL-SCNC: 138 MMOL/L — SIGNIFICANT CHANGE UP (ref 135–145)
WBC # BLD: 8.79 K/UL — SIGNIFICANT CHANGE UP (ref 3.8–10.5)
WBC # FLD AUTO: 8.79 K/UL — SIGNIFICANT CHANGE UP (ref 3.8–10.5)

## 2018-12-23 PROCEDURE — 99233 SBSQ HOSP IP/OBS HIGH 50: CPT

## 2018-12-23 RX ADMIN — Medication 2: at 17:32

## 2018-12-23 RX ADMIN — Medication 500 MILLIGRAM(S): at 19:03

## 2018-12-23 RX ADMIN — Medication 1 DROP(S): at 12:05

## 2018-12-23 RX ADMIN — MONTELUKAST 10 MILLIGRAM(S): 4 TABLET, CHEWABLE ORAL at 12:04

## 2018-12-23 RX ADMIN — Medication 1 DROP(S): at 01:34

## 2018-12-23 RX ADMIN — Medication 4: at 08:34

## 2018-12-23 RX ADMIN — HEPARIN SODIUM 5000 UNIT(S): 5000 INJECTION INTRAVENOUS; SUBCUTANEOUS at 22:15

## 2018-12-23 RX ADMIN — HEPARIN SODIUM 5000 UNIT(S): 5000 INJECTION INTRAVENOUS; SUBCUTANEOUS at 06:46

## 2018-12-23 RX ADMIN — Medication 1 DROP(S): at 17:33

## 2018-12-23 RX ADMIN — Medication 1 DROP(S): at 06:46

## 2018-12-23 RX ADMIN — GABAPENTIN 100 MILLIGRAM(S): 400 CAPSULE ORAL at 06:47

## 2018-12-23 RX ADMIN — INSULIN GLARGINE 10 UNIT(S): 100 INJECTION, SOLUTION SUBCUTANEOUS at 22:17

## 2018-12-23 RX ADMIN — DULOXETINE HYDROCHLORIDE 60 MILLIGRAM(S): 30 CAPSULE, DELAYED RELEASE ORAL at 12:04

## 2018-12-23 RX ADMIN — Medication 500 MILLIGRAM(S): at 06:47

## 2018-12-23 RX ADMIN — PIPERACILLIN AND TAZOBACTAM 25 GRAM(S): 4; .5 INJECTION, POWDER, LYOPHILIZED, FOR SOLUTION INTRAVENOUS at 06:46

## 2018-12-23 RX ADMIN — PANTOPRAZOLE SODIUM 40 MILLIGRAM(S): 20 TABLET, DELAYED RELEASE ORAL at 06:47

## 2018-12-23 RX ADMIN — SODIUM CHLORIDE 110 MILLILITER(S): 9 INJECTION INTRAMUSCULAR; INTRAVENOUS; SUBCUTANEOUS at 06:49

## 2018-12-23 RX ADMIN — Medication 5 MILLIGRAM(S): at 06:47

## 2018-12-23 RX ADMIN — Medication 25 MILLIGRAM(S): at 12:04

## 2018-12-23 RX ADMIN — PIPERACILLIN AND TAZOBACTAM 25 GRAM(S): 4; .5 INJECTION, POWDER, LYOPHILIZED, FOR SOLUTION INTRAVENOUS at 15:02

## 2018-12-23 RX ADMIN — Medication 1000 MILLIGRAM(S): at 00:03

## 2018-12-23 RX ADMIN — Medication 1 DROP(S): at 23:35

## 2018-12-23 RX ADMIN — Medication 500 MILLIGRAM(S): at 17:33

## 2018-12-23 RX ADMIN — Medication 25 MILLIGRAM(S): at 06:47

## 2018-12-23 RX ADMIN — Medication 25 MILLIGRAM(S): at 22:15

## 2018-12-23 RX ADMIN — PIPERACILLIN AND TAZOBACTAM 25 GRAM(S): 4; .5 INJECTION, POWDER, LYOPHILIZED, FOR SOLUTION INTRAVENOUS at 22:16

## 2018-12-23 RX ADMIN — GABAPENTIN 100 MILLIGRAM(S): 400 CAPSULE ORAL at 22:15

## 2018-12-23 RX ADMIN — Medication 0: at 22:15

## 2018-12-23 RX ADMIN — Medication 6: at 12:05

## 2018-12-23 RX ADMIN — HEPARIN SODIUM 5000 UNIT(S): 5000 INJECTION INTRAVENOUS; SUBCUTANEOUS at 12:05

## 2018-12-23 RX ADMIN — GABAPENTIN 100 MILLIGRAM(S): 400 CAPSULE ORAL at 12:04

## 2018-12-23 RX ADMIN — Medication 500 MILLIGRAM(S): at 08:04

## 2018-12-23 NOTE — PROGRESS NOTE ADULT - SUBJECTIVE AND OBJECTIVE BOX
HPI:  53 year old woman with PMH DM2, HTN, OA, Asthma presents to ED with complaint of ataxia x 2 weeks.  She states she feels like she is being pulled very strongly to the left side and has fallen several times.  She denies blurred vision, slurred speech, numbness, tingling, weakness, headache.  This is the first time she has experienced such symptoms.    In the ED, work up unremarkable- CT head normal, EKG NSR.  No abdominal pain; CMP does shows elevated transaminases, CT chest shows gallstones and CBD dilation. (18 Dec 2018 22:54). 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 Biliary stent placed. Biopsies were obtained. Pathology pending.      REVIEW OF SYSTEMS : Unremarkable      General:	    Skin/Breast:  	  Ophthalmologic:  	  ENMT:	    Respiratory and Thorax:  	  Cardiovascular:	    Gastrointestinal:	    Genitourinary:	    Musculoskeletal:	    Neurological:	    Psychiatric:	    Hematology/Lymphatics:	    Endocrine:	    Allergic/Immunologic:	    MEDICATIONS  (STANDING):  artificial  tears Solution 1 Drop(s) Both EYES four times a day  dextrose 5%. 1000 milliLiter(s) (50 mL/Hr) IV Continuous <Continuous>  dextrose 50% Injectable 12.5 Gram(s) IV Push once  dextrose 50% Injectable 25 Gram(s) IV Push once  dextrose 50% Injectable 25 Gram(s) IV Push once  DULoxetine 60 milliGRAM(s) Oral daily  gabapentin 100 milliGRAM(s) Oral three times a day  heparin  Injectable 5000 Unit(s) SubCutaneous every 8 hours  insulin glargine Injectable (LANTUS) 10 Unit(s) SubCutaneous at bedtime  insulin lispro (HumaLOG) corrective regimen sliding scale   SubCutaneous three times a day before meals  insulin lispro (HumaLOG) corrective regimen sliding scale   SubCutaneous at bedtime  meclizine 25 milliGRAM(s) Oral every 8 hours  montelukast 10 milliGRAM(s) Oral daily  naproxen 500 milliGRAM(s) Oral two times a day  pantoprazole    Tablet 40 milliGRAM(s) Oral before breakfast  piperacillin/tazobactam IVPB. 3.375 Gram(s) IV Intermittent every 8 hours  predniSONE   Tablet 5 milliGRAM(s) Oral daily  sodium chloride 0.9%. 1000 milliLiter(s) (110 mL/Hr) IV Continuous <Continuous>    MEDICATIONS  (PRN):  dextrose 40% Gel 15 Gram(s) Oral once PRN Blood Glucose LESS THAN 70 milliGRAM(s)/deciliter  glucagon  Injectable 1 milliGRAM(s) IntraMuscular once PRN Glucose LESS THAN 70 milligrams/deciliter  oxyCODONE    5 mG/acetaminophen 325 mG 1 Tablet(s) Oral every 6 hours PRN Moderate Pain (4 - 6)      Vital Signs Last 24 Hrs  T(C): 36.9 (23 Dec 2018 12:20), Max: 37.1 (22 Dec 2018 18:14)  T(F): 98.4 (23 Dec 2018 12:20), Max: 98.8 (22 Dec 2018 18:14)  HR: 60 (23 Dec 2018 12:20) (57 - 74)  BP: 127/64 (23 Dec 2018 12:20) (96/59 - 142/78)  BP(mean): --  RR: 17 (23 Dec 2018 12:20) (14 - 18)  SpO2: 98% (23 Dec 2018 12:20) (97% - 100%)    PHYSICAL EXAM:      Constitutional:    Eyes: no jaundice    ENMT:    Neck: supple    Breasts:    Back:    Respiratory: normal    Cardiovascular: normal    Gastrointestinal: non tender    Genitourinary:    Rectal:    Extremities: no edema    Vascular:    Neurological:    Skin:    Lymph Nodes:    Musculoskeletal:    Psychiatric:            HEALTH ISSUES - PROBLEM Dx:  Imbalance: Imbalance  Cholelithiasis: Cholelithiasis  Preventive measure: Preventive measure  Neuropathy: Neuropathy  Mild intermittent asthma without complication: Mild intermittent asthma without complication  Type 2 diabetes mellitus without complication, without long-term current use of insulin: Type 2 diabetes mellitus without complication, without long-term current use of insulin  Essential hypertension: Essential hypertension  Abnormal CT scan, chest: Abnormal CT scan, chest  Stroke-like symptoms: Stroke-like symptoms            Assesment & Plan: Malignant biliary obstruction. D/c planning. Out-pt f/u of path report and referral to oncologic surgeon

## 2018-12-23 NOTE — PROGRESS NOTE ADULT - PROBLEM SELECTOR PROBLEM 1
Stroke-like symptoms
Abnormal CT scan, chest

## 2018-12-23 NOTE — PROGRESS NOTE ADULT - PROBLEM SELECTOR PLAN 6
DVT Prophylaxis with Heparin 5000units sc q8hrs  General precautions
Continue gabapentin, Cymbalta
DVT Prophylaxis with Heparin 5000units sc q8hrs  General precautions

## 2018-12-23 NOTE — PROGRESS NOTE ADULT - PROBLEM SELECTOR PLAN 3
Lantus 10 units SC QHS  Sliding scale with coverage  CHO diet
Diet-controlled
Lantus 10 units SC QHS  Sliding scale with coverage  CHO diet

## 2018-12-23 NOTE — PROGRESS NOTE ADULT - PROBLEM SELECTOR PROBLEM 3
Essential hypertension
Type 2 diabetes mellitus without complication, without long-term current use of insulin

## 2018-12-23 NOTE — PROGRESS NOTE ADULT - PROBLEM SELECTOR PLAN 1
MRI neg. possibly vertigo, but is not positional on exam. will give trial of meclizine and f.u echo   Neurochecks Q 4 hrly.  Aspirin daily  ECHO, carotid doppler, MRI brain  Neurology consult pending   PT Evaluation pending    A1C 8.0  lipids 130
Gallstones, dilated CBD, elevated transaminases. imrpoved mild RUQ pain.  bili trending down   surgery and GI consult done   US appreciated
Gallstones, dilated CBD, elevated transaminases. now has mild RUQ pain.  surgery and GI consult done   US appreciated
Gallstones, dilated CBD, elevated transaminases. now has mild RUQ pain.  surgery and GI consult done   US appreciated
Gallstones, dilated CBD, elevated transaminases. now has mild RUQ pain.  will get surgery consult and MRCP   US appreciated

## 2018-12-23 NOTE — PROGRESS NOTE ADULT - ASSESSMENT
53 year old woman with PMH DM2, HTN, OA, Asthma presents to ED with complaint of ataxia and light headed ness x 2 weeks and recentl fall causing back pain.   MRI and carotid doppler neg. echo still pending.   will start nsaids fir back pain and meclizaine for dizziness.   strength intact
53 year old woman with PMH DM2, HTN, OA, Asthma presents to ED with complaint of ataxia and light headed ness x 2 weeks and recent fall causing back pain. After further discussion with the patient she is complaining of blurry vision that has been getting worse over the last year that may be contributing to her symptoms. I suspect that she needs a new prescription. Has normal neuro exam. MRI and carotid doppler neg. echo nromal      Pt is now c/o  RUQ pain. MRCP performed showed possible intraductal stone vs cholangiocarconoma.  ERCP performed yesterday and results pending. Attempting to contact Dr. Vera. Possible lap beena pending results.
53 year old woman with PMH DM2, HTN, OA, Asthma presents to ED with complaint of ataxia and light headed ness x 2 weeks and recent fall causing back pain. After further discussion with the patient she is complaining of blurry vision that has been getting worse over the last year that may be contributing to her symptoms. I suspect that she needs a new prescription. Has normal neuro exam. MRI and carotid doppler neg. echo nromal      Pt is now c/o  RUQ pain. MRCP performed showed possible intraductal stone vs cholangiocarconoma. Recommend ERCP. GI aware and will perform tomorrow am. Possible Lap beena after.
53 year old woman with PMH DM2, HTN, OA, Asthma presents to ED with complaint of ataxia and light headed ness x 2 weeks and recent fall causing back pain. After further discussion with the patient she is complaining of blurry vision that has been getting worse over the last year that may be contributing to her symptoms. I suspect that she needs a new prescription. Has normal neuro exam. MRI and carotid doppler neg. echo nromal      Pt is now c/o  RUQ pain. MRCP performed showed possible intraductal stone vs cholangiocarconoma. Recommend ERCP. GI plans for EGD today followed by possible Lap beena depending on the findings
53 year old woman with PMH DM2, HTN, OA, Asthma presents to ED with complaint of ataxia and light headed ness x 2 weeks and recent fall causing back pain. After further discussion with the patient she is complaining of blurry vision that has been getting worse over the last year that may be contributing to her symptoms. I suspect that she needs a new prescription. Has normal neuro exam. MRI and carotid doppler neg. echo nromal    nsaids helping with back pain. c/w  meclizine for now   strength intact

## 2018-12-23 NOTE — PROGRESS NOTE ADULT - PROBLEM SELECTOR PLAN 4
PMD only has pt on Singulair; no inh
Lantus 10 units SC QHS  Sliding scale with coverage  CHO diet
PMD only has pt on Singulair; no inh

## 2018-12-23 NOTE — PROGRESS NOTE ADULT - PROBLEM SELECTOR PLAN 2
Gallstones, dilated CBD, elevated transaminases without symptoms.  no RUQ  abd pain   US appreciated   Can follow as outpatient  Hold statin
Diet-controlled

## 2018-12-23 NOTE — PROGRESS NOTE ADULT - SUBJECTIVE AND OBJECTIVE BOX
INTERVAL HPI/OVERNIGHT EVENTS: Moderate pain overnight after ERCP. Daughter at bedside.    Vital Signs Last 24 Hrs  T(C): 36.8 (23 Dec 2018 04:37), Max: 37.1 (22 Dec 2018 18:14)  T(F): 98.2 (23 Dec 2018 04:37), Max: 98.8 (22 Dec 2018 18:14)  HR: 66 (23 Dec 2018 04:37) (57 - 71)  BP: 123/67 (23 Dec 2018 04:37) (96/59 - 142/78)  BP(mean): --  RR: 18 (23 Dec 2018 04:37) (14 - 18)  SpO2: 98% (23 Dec 2018 04:37) (97% - 100%)    MEDICATIONS  (STANDING):  artificial  tears Solution 1 Drop(s) Both EYES four times a day  dextrose 5%. 1000 milliLiter(s) (50 mL/Hr) IV Continuous <Continuous>  dextrose 50% Injectable 12.5 Gram(s) IV Push once  dextrose 50% Injectable 25 Gram(s) IV Push once  dextrose 50% Injectable 25 Gram(s) IV Push once  DULoxetine 60 milliGRAM(s) Oral daily  gabapentin 100 milliGRAM(s) Oral three times a day  heparin  Injectable 5000 Unit(s) SubCutaneous every 8 hours  insulin glargine Injectable (LANTUS) 10 Unit(s) SubCutaneous at bedtime  insulin lispro (HumaLOG) corrective regimen sliding scale   SubCutaneous three times a day before meals  insulin lispro (HumaLOG) corrective regimen sliding scale   SubCutaneous at bedtime  meclizine 25 milliGRAM(s) Oral every 8 hours  montelukast 10 milliGRAM(s) Oral daily  naproxen 500 milliGRAM(s) Oral two times a day  pantoprazole    Tablet 40 milliGRAM(s) Oral before breakfast  piperacillin/tazobactam IVPB. 3.375 Gram(s) IV Intermittent every 8 hours  predniSONE   Tablet 5 milliGRAM(s) Oral daily  sodium chloride 0.9%. 1000 milliLiter(s) (110 mL/Hr) IV Continuous <Continuous>    MEDICATIONS  (PRN):  dextrose 40% Gel 15 Gram(s) Oral once PRN Blood Glucose LESS THAN 70 milliGRAM(s)/deciliter  glucagon  Injectable 1 milliGRAM(s) IntraMuscular once PRN Glucose LESS THAN 70 milligrams/deciliter  oxyCODONE    5 mG/acetaminophen 325 mG 1 Tablet(s) Oral every 6 hours PRN Moderate Pain (4 - 6)      PHYSICAL EXAM    GENERAL: AAO in mild distress 2/2 pain  CHEST/LUNG: No respiratory distress  ABDOMEN: Soft, mild diffuse TTP, no rebound/guarding.    I&O:  I&O's Detail    22 Dec 2018 07:01  -  23 Dec 2018 06:14  --------------------------------------------------------  IN:    lactated ringers.: 100 mL    Solution: 100 mL  Total IN: 200 mL    OUT:  Total OUT: 0 mL    Total NET: 200 mL          LABS:                        9.8    9.12  )-----------( 197      ( 22 Dec 2018 07:05 )             29.6     12-22    140  |  105  |  8   ----------------------------<  159<H>  4.0   |  27  |  0.68    Ca    8.8      22 Dec 2018 07:05  Phos  2.9     12-22  Mg     2.0     12-22    TPro  7.0  /  Alb  2.5<L>  /  TBili  2.0<H>  /  DBili  1.60<H>  /  AST  47<H>  /  ALT  51  /  AlkPhos  443<H>  12-22    PT/INR - ( 21 Dec 2018 06:42 )   PT: 15.3 sec;   INR: 1.35 ratio         PTT - ( 21 Dec 2018 06:42 )  PTT:42.5 sec      Impression: 53F s/p ERCP for evaluation of distal CBD dilation. Awaiting ERCP report.    Plan:  ERCP report to dictate future surgical planning  Pain control  CLD  ABX

## 2018-12-23 NOTE — PROGRESS NOTE ADULT - SUBJECTIVE AND OBJECTIVE BOX
Patient is a 53y old  Female who presents with a chief complaint of R/o CVA (18 Dec 2018 22:54)      INTERVAL HPI/OVERNIGHT EVENTS: s/p EGD and results pending. improved mild RUQ pain today. remains afebrile. no n.v.     Med      Allergies    No Known Allergies    Intolerances        REVIEW OF SYSTEMS:  CONSTITUTIONAL: + fatigue  EYES: No eye pain, visual disturbances, or discharge  ENMT:  No difficulty hearing, tinnitus, vertigo; No sinus or throat pain  RESPIRATORY: No cough, wheezing, chills or hemoptysis; No shortness of breath  CARDIOVASCULAR: No chest pain, palpitations, dizziness, or leg swelling  GASTROINTESTINAL: abdominal pain. No nausea, vomiting, or hematemesis; No diarrhea or constipation. No melena or hematochezia.  GENITOURINARY: No dysuria, frequency, hematuria, or incontinence  NEUROLOGICAL: No headaches, memory loss, loss of strength, numbness, or tremors  SKIN: No itching, burning, rashes, or lesions   LYMPH NODES: No enlarged glands  ENDOCRINE: No heat or cold intolerance; No hair loss  MUSCULOSKELETAL: back pain  PSYCHIATRIC: No depression, anxiety, mood swings, or difficulty sleeping  HEME/LYMPH: No easy bruising, or bleeding gums  ALLERGY AND IMMUNOLOGIC: No hives or eczema    Vital Signs Last 24 Hrs  T(C): 36.7 (23 Dec 2018 06:08), Max: 37.1 (22 Dec 2018 18:14)  T(F): 98.1 (23 Dec 2018 06:08), Max: 98.8 (22 Dec 2018 18:14)  HR: 64 (23 Dec 2018 06:08) (57 - 71)  BP: 122/67 (23 Dec 2018 06:08) (96/59 - 142/78)  BP(mean): --  RR: 18 (23 Dec 2018 06:08) (14 - 18)  SpO2: 98% (23 Dec 2018 06:08) (97% - 100%)      PHYSICAL EXAM:  GENERAL: NAD, well-groomed, well-developed  HEAD:  Atraumatic, Normocephalic  EYES: EOMI, PERRLA, conjunctiva and sclera clear  ENMT: No tonsillar erythema, exudates, or enlargement; Moist mucous membranes, Good dentition, No lesions  NECK: Supple, No JVD, Normal thyroid  NERVOUS SYSTEM:  Alert & Oriented X3, Good concentration; Motor Strength 5/5 B/L upper and lower extremities; DTRs 2+ intact and symmetric  CHEST/LUNG: Clear to percussion bilaterally; No rales, rhonchi, wheezing, or rubs  HEART: Regular rate and rhythm; No murmurs, rubs, or gallops  ABDOMEN: Soft, RUQ tenderness. no r/g. Nondistended; Bowel sounds present  EXTREMITIES:  2+ Peripheral Pulses, No clubbing, cyanosis, or edema  LYMPH: No lymphadenopathy noted  SKIN: No rashes or lesions    LABS:                                         10.0   8.79  )-----------( 200      ( 23 Dec 2018 07:04 )             30.9     12-23    138  |  103  |  9   ----------------------------<  107<H>  3.7   |  25  |  0.64    Ca    8.9      23 Dec 2018 07:04  Phos  2.9     12-22  Mg     2.0     12-22    TPro  6.9  /  Alb  2.4<L>  /  TBili  1.4<H>  /  DBili  x   /  AST  44<H>  /  ALT  45  /  AlkPhos  415<H>  12-23              CAPILLARY BLOOD GLUCOSE        RADIOLOGY & ADDITIONAL TESTS:      Imaging Per< from: MR MRCP No Cont (12.21.18 @ 09:40) >  mpression: Dilated common bile duct with an abrupt transition at the   very distal common bile duct. This may be due to presence of a common   bile duct stone or cholangiocarcinoma. The main pancreatic duct is mildly   dilated as well. If clinically indicated, ERCP and endoscopic ultrasound   may be pursued for further evaluation.     Cholelithiasis with trace pericholecystic fluid. Possibility of acute   cholecystitis is considered. Clinical correlation is recommended. If   clinically indicated, HIDA scan may be pursued for further evaluation.    A few small brightly T2 hyperintenselesions in the pancreas may   represent IPMNs or nonspecific cystic neoplasms of pancreas. If   clinically indicated, follow-up abdominal MR may be pursued in 6-12   months to ensure stability.    < end of copied text >  sonally Reviewed:  [ ] YES  [ ] NO    Consultant(s) Notes Reviewed:  [ ] YES  [ ] NO    Care Discussed with Consultants/Other Providers [ ] YES  [ ] NO

## 2018-12-23 NOTE — PROGRESS NOTE ADULT - PROBLEM SELECTOR PROBLEM 2
Abnormal CT scan, chest
Essential hypertension

## 2018-12-23 NOTE — PROGRESS NOTE ADULT - PROBLEM SELECTOR PROBLEM 4
Mild intermittent asthma without complication
Type 2 diabetes mellitus without complication, without long-term current use of insulin

## 2018-12-23 NOTE — PROGRESS NOTE ADULT - PROBLEM SELECTOR PLAN 5
Continue gabapentin, Cymbalta
PMD only has pt on Singulair; no inh

## 2018-12-24 ENCOUNTER — TRANSCRIPTION ENCOUNTER (OUTPATIENT)
Age: 53
End: 2018-12-24

## 2018-12-24 VITALS
TEMPERATURE: 99 F | DIASTOLIC BLOOD PRESSURE: 72 MMHG | HEART RATE: 70 BPM | OXYGEN SATURATION: 97 % | SYSTOLIC BLOOD PRESSURE: 133 MMHG | RESPIRATION RATE: 17 BRPM

## 2018-12-24 LAB
ALBUMIN SERPL ELPH-MCNC: 2.5 G/DL — LOW (ref 3.3–5)
ALP SERPL-CCNC: 391 U/L — HIGH (ref 40–120)
ALT FLD-CCNC: 42 U/L — SIGNIFICANT CHANGE UP (ref 12–78)
ANION GAP SERPL CALC-SCNC: 10 MMOL/L — SIGNIFICANT CHANGE UP (ref 5–17)
AST SERPL-CCNC: 38 U/L — HIGH (ref 15–37)
BILIRUB SERPL-MCNC: 1.1 MG/DL — SIGNIFICANT CHANGE UP (ref 0.2–1.2)
BUN SERPL-MCNC: 7 MG/DL — SIGNIFICANT CHANGE UP (ref 7–23)
CALCIUM SERPL-MCNC: 8.5 MG/DL — SIGNIFICANT CHANGE UP (ref 8.5–10.1)
CANCER AG19-9 SERPL-ACNC: 318.6 U/ML — HIGH
CHLORIDE SERPL-SCNC: 107 MMOL/L — SIGNIFICANT CHANGE UP (ref 96–108)
CO2 SERPL-SCNC: 26 MMOL/L — SIGNIFICANT CHANGE UP (ref 22–31)
CREAT SERPL-MCNC: 0.72 MG/DL — SIGNIFICANT CHANGE UP (ref 0.5–1.3)
GLUCOSE BLDC GLUCOMTR-MCNC: 153 MG/DL — HIGH (ref 70–99)
GLUCOSE SERPL-MCNC: 137 MG/DL — HIGH (ref 70–99)
POTASSIUM SERPL-MCNC: 3.5 MMOL/L — SIGNIFICANT CHANGE UP (ref 3.5–5.3)
POTASSIUM SERPL-SCNC: 3.5 MMOL/L — SIGNIFICANT CHANGE UP (ref 3.5–5.3)
PROT SERPL-MCNC: 7 GM/DL — SIGNIFICANT CHANGE UP (ref 6–8.3)
SODIUM SERPL-SCNC: 143 MMOL/L — SIGNIFICANT CHANGE UP (ref 135–145)

## 2018-12-24 PROCEDURE — 99239 HOSP IP/OBS DSCHRG MGMT >30: CPT

## 2018-12-24 PROCEDURE — 99232 SBSQ HOSP IP/OBS MODERATE 35: CPT

## 2018-12-24 RX ORDER — PIPERACILLIN AND TAZOBACTAM 4; .5 G/20ML; G/20ML
3.38 INJECTION, POWDER, LYOPHILIZED, FOR SOLUTION INTRAVENOUS EVERY 8 HOURS
Qty: 0 | Refills: 0 | Status: DISCONTINUED | OUTPATIENT
Start: 2018-12-24 | End: 2018-12-24

## 2018-12-24 RX ORDER — BENZOCAINE AND MENTHOL 5; 1 G/100ML; G/100ML
1 LIQUID ORAL
Qty: 0 | Refills: 0 | Status: DISCONTINUED | OUTPATIENT
Start: 2018-12-24 | End: 2018-12-24

## 2018-12-24 RX ORDER — MECLIZINE HCL 12.5 MG
1 TABLET ORAL
Qty: 30 | Refills: 0 | OUTPATIENT
Start: 2018-12-24

## 2018-12-24 RX ADMIN — Medication 2: at 07:17

## 2018-12-24 RX ADMIN — Medication 500 MILLIGRAM(S): at 05:56

## 2018-12-24 RX ADMIN — HEPARIN SODIUM 5000 UNIT(S): 5000 INJECTION INTRAVENOUS; SUBCUTANEOUS at 05:55

## 2018-12-24 RX ADMIN — DULOXETINE HYDROCHLORIDE 60 MILLIGRAM(S): 30 CAPSULE, DELAYED RELEASE ORAL at 11:25

## 2018-12-24 RX ADMIN — GABAPENTIN 100 MILLIGRAM(S): 400 CAPSULE ORAL at 06:03

## 2018-12-24 RX ADMIN — HEPARIN SODIUM 5000 UNIT(S): 5000 INJECTION INTRAVENOUS; SUBCUTANEOUS at 11:25

## 2018-12-24 RX ADMIN — Medication 4: at 11:27

## 2018-12-24 RX ADMIN — Medication 25 MILLIGRAM(S): at 11:25

## 2018-12-24 RX ADMIN — Medication 500 MILLIGRAM(S): at 06:40

## 2018-12-24 RX ADMIN — Medication 25 MILLIGRAM(S): at 05:56

## 2018-12-24 RX ADMIN — PIPERACILLIN AND TAZOBACTAM 25 GRAM(S): 4; .5 INJECTION, POWDER, LYOPHILIZED, FOR SOLUTION INTRAVENOUS at 05:54

## 2018-12-24 RX ADMIN — Medication 1 DROP(S): at 06:03

## 2018-12-24 RX ADMIN — Medication 1 DROP(S): at 11:25

## 2018-12-24 RX ADMIN — MONTELUKAST 10 MILLIGRAM(S): 4 TABLET, CHEWABLE ORAL at 11:25

## 2018-12-24 RX ADMIN — GABAPENTIN 100 MILLIGRAM(S): 400 CAPSULE ORAL at 11:25

## 2018-12-24 RX ADMIN — PANTOPRAZOLE SODIUM 40 MILLIGRAM(S): 20 TABLET, DELAYED RELEASE ORAL at 06:02

## 2018-12-24 RX ADMIN — Medication 4: at 17:10

## 2018-12-24 RX ADMIN — Medication 5 MILLIGRAM(S): at 05:56

## 2018-12-24 NOTE — DISCHARGE NOTE ADULT - HOME CARE AGENCY
RN from [facility], [phone], will visit your home the day after discharge to begin supportive home care services.  Skilled Nursing and Physical Therapy evaluation will be provided.

## 2018-12-24 NOTE — DISCHARGE NOTE ADULT - HOSPITAL COURSE
53 year old woman with PMH DM2, HTN, OA, Asthma presents to ED with complaint of ataxia and light headed ness x 2 weeks and recent fall causing back pain. After further discussion with the patient she is complaining of blurry vision that has been getting worse over the last year that may be contributing to her symptoms. MRI neg, dizziness improved.  Pt also complained of abdominal pain, fond to elevated LFTs. Imaging with CBD dilation. Elevated Ca19-9. S/P ERCP, pending biopsy report.     Problem/Plan - 1:  ·  Problem: Abnormal CT scan, chest.  Plan: Gallstones, dilated CBD, elevated transaminases. improved mild RUQ pain.  bili trending down   surgery and GI consult done   US appreciated.      Problem/Plan - 2:  ·  Problem: Essential hypertension.  Plan: Diet-controlled.      Problem/Plan - 3:  ·  Problem: Type 2 diabetes mellitus without complication, without long-term current use of insulin.  Plan: Lantus 10 units SC QHS  Sliding scale with coverage  CHO diet.      Problem/Plan - 4:  ·  Problem: Mild intermittent asthma without complication.  Plan: PMD only has pt on Singulair; no inh.      Problem/Plan - 5:  ·  Problem: Neuropathy.  Plan: Continue gabapentin, Cymbalta.      Problem/Plan - 6:  Problem: Preventive measure. Plan: DVT Prophylaxis with Heparin 5000units sc q8hrs  General precautions.

## 2018-12-24 NOTE — DISCHARGE NOTE ADULT - CARE PROVIDER_API CALL
Pavan Drake), Gastroenterology; Internal Medicine  [address] [place] [address]  Phone: [phone]  Fax: [phone]    Dr. Duarte,   [facility]  [address] [place] [address]  Phone: [phone]  Fax: (   )    -    Dr. Kathleen Miller.,   [facility]  [address] [place] [id number]  Phone: [phone]  Phone: [phone]  Fax: (   )    -    Dr. Duarte,   [facility]  [address] [place] [address]  Phone: [phone]  Phone: [phone]  Fax: (   )    -

## 2018-12-24 NOTE — PROGRESS NOTE ADULT - ATTENDING COMMENTS
I saw and examined the patient and agree with the exam findings and assessment and plan. No acute surgical intervention at this present time. Will require surgical oncology vs HPB.

## 2018-12-24 NOTE — DISCHARGE NOTE ADULT - PLAN OF CARE
symptoms stable S/P ERCP  Elevated Ca 19-9  Follow up with GI for ERCP biopsy report  Follow up with pancreatic surgeon/ oncologist resolved  MRI negative Continue current medication

## 2018-12-24 NOTE — DISCHARGE NOTE ADULT - PROVIDER TOKENS
TOKEN:'[id number]',FREE:[LAST:[Dr. Duarte],PHONE:[phone]],FAX:[(   )    -],ADDRESS:[[facility]  [address] [place] [address]]],FREE:[LAST:[Dr. Kathleen Miller.],PHONE:[phone]],FAX:[(   )    -],ADDRESS:[[facility]  [address] [place] [address]  Phone: [phone]]],FREE:[LAST:[Dr. Duarte],PHONE:[phone]],FAX:[(   )    -],ADDRESS:[[facility]  [address] [place] [address]  Phone: [phone]]]

## 2018-12-24 NOTE — PROGRESS NOTE ADULT - SUBJECTIVE AND OBJECTIVE BOX
INTERVAL HPI/OVERNIGHT EVENTS: No acute events overnight. ERCP performed by GI: distal CBD mass, no stones. [facility] sent for pathology. Patient complaining of sore throat, improved abd pain.    Vital Signs Last 24 Hrs  T(C): 36.8 (24 Dec 2018 06:21), Max: 37.1 (24 Dec 2018 00:29)  T(F): 98.2 (24 Dec 2018 06:21), Max: 98.7 (24 Dec 2018 00:29)  HR: 65 (24 Dec 2018 06:21) (60 - 74)  BP: 120/67 (24 Dec 2018 06:21) (114/70 - 127/64)  BP(mean): --  RR: 18 (24 Dec 2018 06:21) (17 - 18)  SpO2: 96% (24 Dec 2018 06:21) (96% - 98%)    MEDICATIONS  (STANDING):  artificial  tears Solution 1 Drop(s) Both EYES four times a day  dextrose 5%. 1000 milliLiter(s) (50 mL/Hr) IV Continuous <Continuous>  dextrose 50% Injectable 12.5 Gram(s) IV Push once  dextrose 50% Injectable 25 Gram(s) IV Push once  dextrose 50% Injectable 25 Gram(s) IV Push once  DULoxetine 60 milliGRAM(s) Oral daily  gabapentin 100 milliGRAM(s) Oral three times a day  heparin  Injectable 5000 Unit(s) SubCutaneous every 8 hours  insulin glargine Injectable (LANTUS) 10 Unit(s) SubCutaneous at bedtime  insulin lispro (HumaLOG) corrective regimen sliding scale   SubCutaneous three times a day before meals  insulin lispro (HumaLOG) corrective regimen sliding scale   SubCutaneous at bedtime  meclizine 25 milliGRAM(s) Oral every 8 hours  montelukast 10 milliGRAM(s) Oral daily  naproxen 500 milliGRAM(s) Oral two times a day  pantoprazole    Tablet 40 milliGRAM(s) Oral before breakfast  piperacillin/tazobactam IVPB. 3.375 Gram(s) IV Intermittent every 8 hours  predniSONE   Tablet 5 milliGRAM(s) Oral daily  sodium chloride 0.9%. 1000 milliLiter(s) (110 mL/Hr) IV Continuous <Continuous>    MEDICATIONS  (PRN):  benzocaine 15 mG/menthol 3.6 mG (Sugar-Free) Lozenge 1 Lozenge Oral every 2 hours PRN Sore Throat  dextrose 40% Gel 15 Gram(s) Oral once PRN Blood Glucose LESS THAN 70 milliGRAM(s)/deciliter  glucagon  Injectable 1 milliGRAM(s) IntraMuscular once PRN Glucose LESS THAN 70 milligrams/deciliter  oxyCODONE    5 mG/acetaminophen 325 mG 1 Tablet(s) Oral every 6 hours PRN Moderate Pain (4 - 6)      PHYSICAL EXAM    GENERAL: AAO in NAD  CHEST/LUNG: CTAB  HEART: RRR  ABDOMEN: Softly distended, minimal TTP.    I&O:  I&O's Detail    22 Dec 2018 07:01  -  23 Dec 2018 07:00  --------------------------------------------------------  IN:    lactated ringers.: 100 mL    Solution: 100 mL  Total IN: 200 mL    OUT:  Total OUT: 0 mL    Total NET: 200 mL      23 Dec 2018 07:01  -  24 Dec 2018 06:50  --------------------------------------------------------  IN:    sodium chloride 0.9%.: 1320 mL    Solution: 200 mL  Total IN: 1520 mL    OUT:  Total OUT: 0 mL    Total NET: 1520 mL          LABS:                        10.0   8.79  )-----------( 200      ( 23 Dec 2018 07:04 )             30.9     12-23    138  |  103  |  9   ----------------------------<  107<H>  3.7   |  25  |  0.64    Ca    8.9      23 Dec 2018 07:04  Phos  2.9     12-22  Mg     2.0     12-22    TPro  6.9  /  Alb  2.4<L>  /  TBili  1.4<H>  /  DBili  x   /  AST  44<H>  /  ALT  45  /  AlkPhos  415<H>  12-23    Impression: 53F with CBD mass, r/o cholangiocarcinoma.    Plan:  No acute surgical intervention. Will need hepatobiliary vs surgical oncology f/u based on pathology report.  Cepacol lozenge ordered prn for sore throat.  Pain control  Discharge planning per medicine team.

## 2018-12-24 NOTE — PROGRESS NOTE ADULT - PROVIDER SPECIALTY LIST ADULT
Gastroenterology
Hospitalist
Neurology
Surgery
Hospitalist

## 2018-12-24 NOTE — DISCHARGE NOTE ADULT - CARE PLAN
Principal Discharge DX:	Common bile duct mass  Goal:	symptoms stable  Assessment and plan of treatment:	S/P ERCP  Elevated Ca 19-9  Follow up with GI for ERCP biopsy report  Follow up with pancreatic surgeon/ oncologist  Secondary Diagnosis:	Dizziness  Assessment and plan of treatment:	resolved  MRI negative  Secondary Diagnosis:	Essential hypertension  Assessment and plan of treatment:	Continue current medication  Secondary Diagnosis:	Gastroesophageal reflux disease without esophagitis  Assessment and plan of treatment:	Continue current medication  Secondary Diagnosis:	Mild intermittent asthma without complication  Assessment and plan of treatment:	Continue current medication  Secondary Diagnosis:	Type 2 diabetes mellitus without complication, without long-term current use of insulin  Assessment and plan of treatment:	Continue current medication

## 2018-12-24 NOTE — DISCHARGE NOTE ADULT - SECONDARY DIAGNOSIS.
Dizziness Essential hypertension Gastroesophageal reflux disease without esophagitis Mild intermittent asthma without complication Type 2 diabetes mellitus without complication, without long-term current use of insulin

## 2018-12-24 NOTE — DISCHARGE NOTE ADULT - MEDICATION SUMMARY - MEDICATIONS TO TAKE
I will START or STAY ON the medications listed below when I get home from the hospital:    gabapentin 100 mg oral capsule  -- 1 cap(s) by mouth 3 times a day  -- Indication: For Neuropathy    DULoxetine 60 mg oral delayed release capsule  -- 1 cap(s) by mouth once a day  -- Indication: For Depression    pioglitazone 15 mg oral tablet  -- 1 tab(s) by mouth once a day  -- Indication: For Dm    meclizine 25 mg oral tablet  -- 1 tab(s) by mouth every 8 hours, As Needed -for dizziness   -- Indication: For Dizziness    atorvastatin 10 mg oral tablet  -- orally once (at bedtime)  -- Indication: For HLD    docusate sodium 100 mg oral tablet  -- orally 3 times a day, As Needed  -- Indication: For Constipation    montelukast 10 mg oral tablet  -- orally once (at bedtime)  -- Indication: For ASTHMA    omeprazole 40 mg oral delayed release capsule  -- 1 cap(s) by mouth once a day  -- Indication: For Gerd

## 2018-12-26 LAB — SURGICAL PATHOLOGY STUDY: SIGNIFICANT CHANGE UP

## 2018-12-26 NOTE — CHART NOTE - NSCHARTNOTEFT_GEN_A_CORE
Called pt's daughter Olegario Lagunas to discuss about path report. She wasn't able to make appointment with [facility] and [facility] as they don't take her insurance. Told her to make appointment with primary doctor immediately and he will refer her to oncologist within the insurance coverage, also recommended to go to [facility] out[facility] clinic.

## 2019-01-03 DIAGNOSIS — E78.5 HYPERLIPIDEMIA, UNSPECIFIED: ICD-10-CM

## 2019-01-03 DIAGNOSIS — F32.9 MAJOR DEPRESSIVE DISORDER, SINGLE EPISODE, UNSPECIFIED: ICD-10-CM

## 2019-01-03 DIAGNOSIS — K83.1 OBSTRUCTION OF BILE DUCT: ICD-10-CM

## 2019-01-03 DIAGNOSIS — R10.13 EPIGASTRIC PAIN: ICD-10-CM

## 2019-01-03 DIAGNOSIS — J45.20 MILD INTERMITTENT ASTHMA, UNCOMPLICATED: ICD-10-CM

## 2019-01-03 DIAGNOSIS — R42 DIZZINESS AND GIDDINESS: ICD-10-CM

## 2019-01-03 DIAGNOSIS — E11.40 TYPE 2 DIABETES MELLITUS WITH DIABETIC NEUROPATHY, UNSPECIFIED: ICD-10-CM
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
## 2019-01-03 DIAGNOSIS — E87.1 HYPO-OSMOLALITY AND HYPONATREMIA: ICD-10-CM

## 2019-01-03 DIAGNOSIS — D72.829 ELEVATED WHITE BLOOD CELL COUNT, UNSPECIFIED: ICD-10-CM
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
## 2019-01-03 DIAGNOSIS — E11.65 TYPE 2 DIABETES MELLITUS WITH HYPERGLYCEMIA: ICD-10-CM
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
## 2019-01-03 DIAGNOSIS — M54.9 DORSALGIA, UNSPECIFIED: ICD-10-CM

## 2019-01-03 DIAGNOSIS — K21.9 GASTRO-ESOPHAGEAL REFLUX DISEASE WITHOUT ESOPHAGITIS: ICD-10-CM

## 2019-01-03 DIAGNOSIS — I10 ESSENTIAL (PRIMARY) HYPERTENSION: ICD-10-CM

## 2019-01-03 DIAGNOSIS — R26.89 OTHER ABNORMALITIES OF GAIT AND MOBILITY: ICD-10-CM

## 2019-01-03 DIAGNOSIS — C24.1 MALIGNANT NEOPLASM OF AMPULLA OF VATER: ICD-10-CM

## 2019-01-05 ENCOUNTER — INPATIENT (INPATIENT)
Facility: HOSPITAL | Age: 54
LOS: 58 days | Discharge: HOME | End: 2019-03-05
Attending: SURGERY | Admitting: SURGERY
Payer: MEDICAID

## 2019-01-05 VITALS
TEMPERATURE: 99 F | SYSTOLIC BLOOD PRESSURE: 84 MMHG | HEART RATE: 74 BPM | OXYGEN SATURATION: 100 % | DIASTOLIC BLOOD PRESSURE: 48 MMHG | RESPIRATION RATE: 20 BRPM

## 2019-01-05 DIAGNOSIS — I10 ESSENTIAL (PRIMARY) HYPERTENSION: ICD-10-CM

## 2019-01-05 DIAGNOSIS — K83.09 OTHER CHOLANGITIS: ICD-10-CM

## 2019-01-05 DIAGNOSIS — Z29.9 ENCOUNTER FOR PROPHYLACTIC MEASURES, UNSPECIFIED: ICD-10-CM

## 2019-01-05 DIAGNOSIS — C23 MALIGNANT NEOPLASM OF GALLBLADDER: ICD-10-CM

## 2019-01-05 DIAGNOSIS — E11.9 TYPE 2 DIABETES MELLITUS WITHOUT COMPLICATIONS: ICD-10-CM

## 2019-01-05 DIAGNOSIS — A41.9 SEPSIS, UNSPECIFIED ORGANISM: ICD-10-CM

## 2019-01-05 LAB
ALBUMIN SERPL ELPH-MCNC: 4 G/DL — SIGNIFICANT CHANGE UP (ref 3.3–5)
ALP SERPL-CCNC: 795 U/L — HIGH (ref 40–120)
ALT FLD-CCNC: 60 U/L — HIGH (ref 4–33)
APPEARANCE UR: CLEAR — SIGNIFICANT CHANGE UP
APTT BLD: 25.6 SEC — LOW (ref 27.5–36.3)
AST SERPL-CCNC: 122 U/L — HIGH (ref 4–32)
BASE EXCESS BLDV CALC-SCNC: -0.2 MMOL/L — SIGNIFICANT CHANGE UP
BASE EXCESS BLDV CALC-SCNC: 2.5 MMOL/L — SIGNIFICANT CHANGE UP
BASE EXCESS BLDV CALC-SCNC: 3.1 MMOL/L — SIGNIFICANT CHANGE UP
BASOPHILS # BLD AUTO: 0.04 K/UL — SIGNIFICANT CHANGE UP (ref 0–0.2)
BASOPHILS NFR BLD AUTO: 0.3 % — SIGNIFICANT CHANGE UP (ref 0–2)
BILIRUB SERPL-MCNC: 2.2 MG/DL — HIGH (ref 0.2–1.2)
BILIRUB UR-MCNC: NEGATIVE — SIGNIFICANT CHANGE UP
BLD GP AB SCN SERPL QL: NEGATIVE — SIGNIFICANT CHANGE UP
BLOOD GAS VENOUS - CREATININE: 0.47 MG/DL — LOW (ref 0.5–1.3)
BLOOD GAS VENOUS - CREATININE: 0.48 MG/DL — LOW (ref 0.5–1.3)
BLOOD GAS VENOUS - CREATININE: 0.61 MG/DL — SIGNIFICANT CHANGE UP (ref 0.5–1.3)
BLOOD UR QL VISUAL: NEGATIVE — SIGNIFICANT CHANGE UP
BUN SERPL-MCNC: 6 MG/DL — LOW (ref 7–23)
CALCIUM SERPL-MCNC: 10.3 MG/DL — SIGNIFICANT CHANGE UP (ref 8.4–10.5)
CHLORIDE BLDV-SCNC: 100 MMOL/L — SIGNIFICANT CHANGE UP (ref 96–108)
CHLORIDE BLDV-SCNC: 104 MMOL/L — SIGNIFICANT CHANGE UP (ref 96–108)
CHLORIDE BLDV-SCNC: 105 MMOL/L — SIGNIFICANT CHANGE UP (ref 96–108)
CHLORIDE SERPL-SCNC: 98 MMOL/L — SIGNIFICANT CHANGE UP (ref 98–107)
CO2 SERPL-SCNC: 23 MMOL/L — SIGNIFICANT CHANGE UP (ref 22–31)
COLOR SPEC: YELLOW — SIGNIFICANT CHANGE UP
CREAT SERPL-MCNC: 0.55 MG/DL — SIGNIFICANT CHANGE UP (ref 0.5–1.3)
EOSINOPHIL # BLD AUTO: 0.03 K/UL — SIGNIFICANT CHANGE UP (ref 0–0.5)
EOSINOPHIL NFR BLD AUTO: 0.2 % — SIGNIFICANT CHANGE UP (ref 0–6)
GAS PNL BLDV: 130 MMOL/L — LOW (ref 136–146)
GAS PNL BLDV: 133 MMOL/L — LOW (ref 136–146)
GAS PNL BLDV: 135 MMOL/L — LOW (ref 136–146)
GLUCOSE BLDC GLUCOMTR-MCNC: 122 MG/DL — HIGH (ref 70–99)
GLUCOSE BLDC GLUCOMTR-MCNC: 124 MG/DL — HIGH (ref 70–99)
GLUCOSE BLDV-MCNC: 118 — HIGH (ref 70–99)
GLUCOSE BLDV-MCNC: 147 — HIGH (ref 70–99)
GLUCOSE BLDV-MCNC: 168 — HIGH (ref 70–99)
GLUCOSE SERPL-MCNC: 175 MG/DL — HIGH (ref 70–99)
GLUCOSE UR-MCNC: NEGATIVE — SIGNIFICANT CHANGE UP
HCG SERPL-ACNC: < 5 MIU/ML — SIGNIFICANT CHANGE UP
HCO3 BLDV-SCNC: 23 MMOL/L — SIGNIFICANT CHANGE UP (ref 20–27)
HCO3 BLDV-SCNC: 24 MMOL/L — SIGNIFICANT CHANGE UP (ref 20–27)
HCO3 BLDV-SCNC: 26 MMOL/L — SIGNIFICANT CHANGE UP (ref 20–27)
HCT VFR BLD CALC: 34.9 % — SIGNIFICANT CHANGE UP (ref 34.5–45)
HCT VFR BLDV CALC: 27.5 % — LOW (ref 34.5–45)
HCT VFR BLDV CALC: 30.4 % — LOW (ref 34.5–45)
HCT VFR BLDV CALC: 33.9 % — LOW (ref 34.5–45)
HGB BLD-MCNC: 11 G/DL — LOW (ref 11.5–15.5)
HGB BLDV-MCNC: 11 G/DL — LOW (ref 11.5–15.5)
HGB BLDV-MCNC: 8.8 G/DL — LOW (ref 11.5–15.5)
HGB BLDV-MCNC: 9.8 G/DL — LOW (ref 11.5–15.5)
IMM GRANULOCYTES NFR BLD AUTO: 0.3 % — SIGNIFICANT CHANGE UP (ref 0–1.5)
INR BLD: 1.25 — HIGH (ref 0.88–1.17)
KETONES UR-MCNC: NEGATIVE — SIGNIFICANT CHANGE UP
LACTATE BLDV-MCNC: 2.3 MMOL/L — HIGH (ref 0.5–2)
LACTATE BLDV-MCNC: 3.2 MMOL/L — HIGH (ref 0.5–2)
LACTATE BLDV-MCNC: 4.4 MMOL/L — CRITICAL HIGH (ref 0.5–2)
LACTATE SERPL-SCNC: 4.2 MMOL/L — CRITICAL HIGH (ref 0.5–2)
LEUKOCYTE ESTERASE UR-ACNC: NEGATIVE — SIGNIFICANT CHANGE UP
LYMPHOCYTES # BLD AUTO: 1.23 K/UL — SIGNIFICANT CHANGE UP (ref 1–3.3)
LYMPHOCYTES # BLD AUTO: 8.1 % — LOW (ref 13–44)
MCHC RBC-ENTMCNC: 24.7 PG — LOW (ref 27–34)
MCHC RBC-ENTMCNC: 31.5 % — LOW (ref 32–36)
MCV RBC AUTO: 78.4 FL — LOW (ref 80–100)
METHOD TYPE: SIGNIFICANT CHANGE UP
MONOCYTES # BLD AUTO: 0.41 K/UL — SIGNIFICANT CHANGE UP (ref 0–0.9)
MONOCYTES NFR BLD AUTO: 2.7 % — SIGNIFICANT CHANGE UP (ref 2–14)
NEUTROPHILS # BLD AUTO: 13.45 K/UL — HIGH (ref 1.8–7.4)
NEUTROPHILS NFR BLD AUTO: 88.4 % — HIGH (ref 43–77)
NITRITE UR-MCNC: NEGATIVE — SIGNIFICANT CHANGE UP
NRBC # FLD: 0 K/UL — LOW (ref 25–125)
ORGANISM # SPEC MICROSCOPIC CNT: SIGNIFICANT CHANGE UP
ORGANISM # SPEC MICROSCOPIC CNT: SIGNIFICANT CHANGE UP
PCO2 BLDV: 41 MMHG — SIGNIFICANT CHANGE UP (ref 41–51)
PCO2 BLDV: 41 MMHG — SIGNIFICANT CHANGE UP (ref 41–51)
PCO2 BLDV: 54 MMHG — HIGH (ref 41–51)
PH BLDV: 7.33 PH — SIGNIFICANT CHANGE UP (ref 7.32–7.43)
PH BLDV: 7.39 PH — SIGNIFICANT CHANGE UP (ref 7.32–7.43)
PH BLDV: 7.43 PH — SIGNIFICANT CHANGE UP (ref 7.32–7.43)
PH UR: 8 — SIGNIFICANT CHANGE UP (ref 5–8)
PLATELET # BLD AUTO: 273 K/UL — SIGNIFICANT CHANGE UP (ref 150–400)
PMV BLD: 11 FL — SIGNIFICANT CHANGE UP (ref 7–13)
PO2 BLDV: 17 MMHG — LOW (ref 35–40)
PO2 BLDV: 29 MMHG — LOW (ref 35–40)
PO2 BLDV: 29 MMHG — LOW (ref 35–40)
POTASSIUM BLDV-SCNC: 3.7 MMOL/L — SIGNIFICANT CHANGE UP (ref 3.4–4.5)
POTASSIUM BLDV-SCNC: 3.8 MMOL/L — SIGNIFICANT CHANGE UP (ref 3.4–4.5)
POTASSIUM BLDV-SCNC: 4.2 MMOL/L — SIGNIFICANT CHANGE UP (ref 3.4–4.5)
POTASSIUM SERPL-MCNC: 4.4 MMOL/L — SIGNIFICANT CHANGE UP (ref 3.5–5.3)
POTASSIUM SERPL-SCNC: 4.4 MMOL/L — SIGNIFICANT CHANGE UP (ref 3.5–5.3)
PROT SERPL-MCNC: 8.1 G/DL — SIGNIFICANT CHANGE UP (ref 6–8.3)
PROT UR-MCNC: 10 — SIGNIFICANT CHANGE UP
PROTHROM AB SERPL-ACNC: 14.3 SEC — HIGH (ref 9.8–13.1)
RBC # BLD: 4.45 M/UL — SIGNIFICANT CHANGE UP (ref 3.8–5.2)
RBC # FLD: 15.1 % — HIGH (ref 10.3–14.5)
RH IG SCN BLD-IMP: POSITIVE — SIGNIFICANT CHANGE UP
SAO2 % BLDV: 14.8 % — LOW (ref 60–85)
SAO2 % BLDV: 46.3 % — LOW (ref 60–85)
SAO2 % BLDV: 48.8 % — LOW (ref 60–85)
SODIUM SERPL-SCNC: 137 MMOL/L — SIGNIFICANT CHANGE UP (ref 135–145)
SP GR SPEC: 1.04 — SIGNIFICANT CHANGE UP (ref 1–1.04)
SPECIMEN SOURCE: SIGNIFICANT CHANGE UP
UROBILINOGEN FLD QL: NORMAL — SIGNIFICANT CHANGE UP
WBC # BLD: 15.21 K/UL — HIGH (ref 3.8–10.5)
WBC # FLD AUTO: 15.21 K/UL — HIGH (ref 3.8–10.5)

## 2019-01-05 PROCEDURE — 99223 1ST HOSP IP/OBS HIGH 75: CPT | Mod: GC

## 2019-01-05 PROCEDURE — 71045 X-RAY EXAM CHEST 1 VIEW: CPT | Mod: 26

## 2019-01-05 PROCEDURE — 74177 CT ABD & PELVIS W/CONTRAST: CPT | Mod: 26

## 2019-01-05 PROCEDURE — 99223 1ST HOSP IP/OBS HIGH 75: CPT

## 2019-01-05 RX ORDER — SODIUM CHLORIDE 9 MG/ML
1000 INJECTION, SOLUTION INTRAVENOUS
Qty: 0 | Refills: 0 | Status: DISCONTINUED | OUTPATIENT
Start: 2019-01-05 | End: 2019-01-13

## 2019-01-05 RX ORDER — ACETAMINOPHEN 500 MG
650 TABLET ORAL EVERY 6 HOURS
Qty: 0 | Refills: 0 | Status: DISCONTINUED | OUTPATIENT
Start: 2019-01-05 | End: 2019-01-11

## 2019-01-05 RX ORDER — MONTELUKAST 4 MG/1
0 TABLET, CHEWABLE ORAL
Qty: 0 | Refills: 0 | COMMUNITY

## 2019-01-05 RX ORDER — DEXTROSE 50 % IN WATER 50 %
15 SYRINGE (ML) INTRAVENOUS ONCE
Qty: 0 | Refills: 0 | Status: DISCONTINUED | OUTPATIENT
Start: 2019-01-05 | End: 2019-01-11

## 2019-01-05 RX ORDER — INSULIN LISPRO 100/ML
VIAL (ML) SUBCUTANEOUS EVERY 6 HOURS
Qty: 0 | Refills: 0 | Status: DISCONTINUED | OUTPATIENT
Start: 2019-01-05 | End: 2019-01-07

## 2019-01-05 RX ORDER — SODIUM CHLORIDE 9 MG/ML
1000 INJECTION, SOLUTION INTRAVENOUS
Qty: 0 | Refills: 0 | Status: DISCONTINUED | OUTPATIENT
Start: 2019-01-05 | End: 2019-01-05

## 2019-01-05 RX ORDER — HYDROMORPHONE HYDROCHLORIDE 2 MG/ML
0.5 INJECTION INTRAMUSCULAR; INTRAVENOUS; SUBCUTANEOUS EVERY 6 HOURS
Qty: 0 | Refills: 0 | Status: DISCONTINUED | OUTPATIENT
Start: 2019-01-05 | End: 2019-01-06

## 2019-01-05 RX ORDER — GLUCAGON INJECTION, SOLUTION 0.5 MG/.1ML
1 INJECTION, SOLUTION SUBCUTANEOUS ONCE
Qty: 0 | Refills: 0 | Status: DISCONTINUED | OUTPATIENT
Start: 2019-01-05 | End: 2019-01-11

## 2019-01-05 RX ORDER — DULOXETINE HYDROCHLORIDE 30 MG/1
60 CAPSULE, DELAYED RELEASE ORAL DAILY
Qty: 0 | Refills: 0 | Status: DISCONTINUED | OUTPATIENT
Start: 2019-01-05 | End: 2019-01-11

## 2019-01-05 RX ORDER — SODIUM CHLORIDE 9 MG/ML
750 INJECTION INTRAMUSCULAR; INTRAVENOUS; SUBCUTANEOUS ONCE
Qty: 0 | Refills: 0 | Status: COMPLETED | OUTPATIENT
Start: 2019-01-05 | End: 2019-01-05

## 2019-01-05 RX ORDER — DEXTROSE 50 % IN WATER 50 %
12.5 SYRINGE (ML) INTRAVENOUS ONCE
Qty: 0 | Refills: 0 | Status: DISCONTINUED | OUTPATIENT
Start: 2019-01-05 | End: 2019-01-11

## 2019-01-05 RX ORDER — MORPHINE SULFATE 50 MG/1
4 CAPSULE, EXTENDED RELEASE ORAL ONCE
Qty: 0 | Refills: 0 | Status: DISCONTINUED | OUTPATIENT
Start: 2019-01-05 | End: 2019-01-05

## 2019-01-05 RX ORDER — ATORVASTATIN CALCIUM 80 MG/1
0 TABLET, FILM COATED ORAL
Qty: 0 | Refills: 0 | COMMUNITY

## 2019-01-05 RX ORDER — PIPERACILLIN AND TAZOBACTAM 4; .5 G/20ML; G/20ML
3.38 INJECTION, POWDER, LYOPHILIZED, FOR SOLUTION INTRAVENOUS ONCE
Qty: 0 | Refills: 0 | Status: COMPLETED | OUTPATIENT
Start: 2019-01-05 | End: 2019-01-05

## 2019-01-05 RX ORDER — SODIUM CHLORIDE 9 MG/ML
1000 INJECTION INTRAMUSCULAR; INTRAVENOUS; SUBCUTANEOUS ONCE
Qty: 0 | Refills: 0 | Status: COMPLETED | OUTPATIENT
Start: 2019-01-05 | End: 2019-01-05

## 2019-01-05 RX ORDER — ACETAMINOPHEN 500 MG
1000 TABLET ORAL ONCE
Qty: 0 | Refills: 0 | Status: COMPLETED | OUTPATIENT
Start: 2019-01-05 | End: 2019-01-05

## 2019-01-05 RX ORDER — SODIUM CHLORIDE 9 MG/ML
1000 INJECTION, SOLUTION INTRAVENOUS
Qty: 0 | Refills: 0 | Status: DISCONTINUED | OUTPATIENT
Start: 2019-01-05 | End: 2019-01-08

## 2019-01-05 RX ORDER — PIPERACILLIN AND TAZOBACTAM 4; .5 G/20ML; G/20ML
3.38 INJECTION, POWDER, LYOPHILIZED, FOR SOLUTION INTRAVENOUS EVERY 8 HOURS
Qty: 0 | Refills: 0 | Status: DISCONTINUED | OUTPATIENT
Start: 2019-01-05 | End: 2019-01-11

## 2019-01-05 RX ORDER — DOCUSATE SODIUM 100 MG
0 CAPSULE ORAL
Qty: 0 | Refills: 0 | COMMUNITY

## 2019-01-05 RX ORDER — GABAPENTIN 400 MG/1
100 CAPSULE ORAL THREE TIMES A DAY
Qty: 0 | Refills: 0 | Status: DISCONTINUED | OUTPATIENT
Start: 2019-01-05 | End: 2019-01-11

## 2019-01-05 RX ORDER — ONDANSETRON 8 MG/1
4 TABLET, FILM COATED ORAL ONCE
Qty: 0 | Refills: 0 | Status: COMPLETED | OUTPATIENT
Start: 2019-01-05 | End: 2019-01-05

## 2019-01-05 RX ORDER — DEXTROSE 50 % IN WATER 50 %
25 SYRINGE (ML) INTRAVENOUS ONCE
Qty: 0 | Refills: 0 | Status: DISCONTINUED | OUTPATIENT
Start: 2019-01-05 | End: 2019-01-11

## 2019-01-05 RX ORDER — SODIUM CHLORIDE 9 MG/ML
1000 INJECTION, SOLUTION INTRAVENOUS ONCE
Qty: 0 | Refills: 0 | Status: COMPLETED | OUTPATIENT
Start: 2019-01-05 | End: 2019-01-05

## 2019-01-05 RX ORDER — VANCOMYCIN HCL 1 G
1000 VIAL (EA) INTRAVENOUS ONCE
Qty: 0 | Refills: 0 | Status: COMPLETED | OUTPATIENT
Start: 2019-01-05 | End: 2019-01-05

## 2019-01-05 RX ORDER — ONDANSETRON 8 MG/1
8 TABLET, FILM COATED ORAL EVERY 6 HOURS
Qty: 0 | Refills: 0 | Status: DISCONTINUED | OUTPATIENT
Start: 2019-01-05 | End: 2019-01-11

## 2019-01-05 RX ADMIN — PIPERACILLIN AND TAZOBACTAM 25 GRAM(S): 4; .5 INJECTION, POWDER, LYOPHILIZED, FOR SOLUTION INTRAVENOUS at 22:38

## 2019-01-05 RX ADMIN — MORPHINE SULFATE 4 MILLIGRAM(S): 50 CAPSULE, EXTENDED RELEASE ORAL at 06:00

## 2019-01-05 RX ADMIN — Medication 1000 MILLIGRAM(S): at 06:26

## 2019-01-05 RX ADMIN — SODIUM CHLORIDE 1000 MILLILITER(S): 9 INJECTION INTRAMUSCULAR; INTRAVENOUS; SUBCUTANEOUS at 05:55

## 2019-01-05 RX ADMIN — Medication 400 MILLIGRAM(S): at 05:55

## 2019-01-05 RX ADMIN — SODIUM CHLORIDE 2000 MILLILITER(S): 9 INJECTION INTRAMUSCULAR; INTRAVENOUS; SUBCUTANEOUS at 04:26

## 2019-01-05 RX ADMIN — PIPERACILLIN AND TAZOBACTAM 200 GRAM(S): 4; .5 INJECTION, POWDER, LYOPHILIZED, FOR SOLUTION INTRAVENOUS at 05:55

## 2019-01-05 RX ADMIN — MORPHINE SULFATE 4 MILLIGRAM(S): 50 CAPSULE, EXTENDED RELEASE ORAL at 04:26

## 2019-01-05 RX ADMIN — SODIUM CHLORIDE 750 MILLILITER(S): 9 INJECTION INTRAMUSCULAR; INTRAVENOUS; SUBCUTANEOUS at 05:48

## 2019-01-05 RX ADMIN — SODIUM CHLORIDE 100 MILLILITER(S): 9 INJECTION, SOLUTION INTRAVENOUS at 14:17

## 2019-01-05 RX ADMIN — Medication 1000 MILLIGRAM(S): at 09:00

## 2019-01-05 RX ADMIN — ONDANSETRON 4 MILLIGRAM(S): 8 TABLET, FILM COATED ORAL at 07:24

## 2019-01-05 RX ADMIN — PIPERACILLIN AND TAZOBACTAM 3.38 GRAM(S): 4; .5 INJECTION, POWDER, LYOPHILIZED, FOR SOLUTION INTRAVENOUS at 06:26

## 2019-01-05 RX ADMIN — PIPERACILLIN AND TAZOBACTAM 25 GRAM(S): 4; .5 INJECTION, POWDER, LYOPHILIZED, FOR SOLUTION INTRAVENOUS at 15:36

## 2019-01-05 RX ADMIN — Medication 1000 MILLIGRAM(S): at 08:20

## 2019-01-05 RX ADMIN — Medication 650 MILLIGRAM(S): at 15:40

## 2019-01-05 RX ADMIN — Medication 250 MILLIGRAM(S): at 07:10

## 2019-01-05 RX ADMIN — GABAPENTIN 100 MILLIGRAM(S): 400 CAPSULE ORAL at 22:36

## 2019-01-05 RX ADMIN — SODIUM CHLORIDE 1000 MILLILITER(S): 9 INJECTION, SOLUTION INTRAVENOUS at 15:16

## 2019-01-05 RX ADMIN — SODIUM CHLORIDE 150 MILLILITER(S): 9 INJECTION, SOLUTION INTRAVENOUS at 19:00

## 2019-01-05 NOTE — CONSULT NOTE ADULT - ASSESSMENT
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 Pt currently hemodynamically stable, afebrile since Abx  2) DM2  3) HTN    Recommendations:  -Keep patient NPO for now  -Tentative plan for ERCP Monday if pt remains stable, earlier emergent in OR if patient clinically decompensates  -Continue with Zosyn  -Follow up blood culture  -Appreciate surg onc consult  -IVF, analgesia  -Will eventually need staging and oncology evaluation  -Rest of care per primary team

## 2019-01-05 NOTE — ED PROVIDER NOTE - PROGRESS NOTE DETAILS
Cathy Liu DO: GI, surgery & MICU consulted. Pending repeat vbg. Patient not aware of biopsy results. Notified family at bedside that patient needs to be notified because patient needs to be part of decision making.

## 2019-01-05 NOTE — H&P ADULT - PROBLEM SELECTOR PLAN 5
- hold all BP meds in the setting of sepsis - VTE: Lovenox   - Diet: NPO  dispo: home with surg onc follow up

## 2019-01-05 NOTE — H&P ADULT - HISTORY OF PRESENT ILLNESS
53 y.o. 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 Patient had acute onset of RUQ pain yesterday, with no inciting factors or relief with Aleve. The patient's family also endorsed 1-2 episodes of NBNB emesis yesterday and has had decreased PO intake over the last 36hours. As of diagnosis of cancer, the patient has not had follow up with oncology or surg-oncology due insurance issues. The patient has an appointment with [facility] on Wednesday for surgical evaluation.

## 2019-01-05 NOTE — ED ADULT NURSE REASSESSMENT NOTE - NS ED NURSE REASSESS COMMENT FT1
Pt. is alert and oriented x 4 Romansh speaking shmuel Ruiz and daughter Deepthi provided translation. No c/o abdominal pain no respiratory distress, will continue to monitor.

## 2019-01-05 NOTE — ED PROVIDER NOTE - MEDICAL DECISION MAKING DETAILS
Cathy Liu, DO: 53 ill appearing F with concern for ascending cholangitis 2/2 obstructive mass. Will obtain CT, xray for free air given diffuse TTP, pain control and septic w/u and patient TBA.

## 2019-01-05 NOTE — H&P ADULT - ASSESSMENT
53 y.o. female with T2DM, HTN, OA, and asthma recently diagnosed invasive ampullary adenocarcinoma of the CBD admitted for severe sepsis due to cholangitis

## 2019-01-05 NOTE — H&P ADULT - PROBLEM SELECTOR PLAN 2
- Pt febrile, leukocytosis, tachycardic and elevated lactate meeting sepsis requirements   - cholangitis the most likely source   - c/w Zosyn   - f/u blood cultures   - Monitor VS

## 2019-01-05 NOTE — CONSULT NOTE ADULT - ASSESSMENT
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 bili. Patient clinically improving with medical management.    Plan/recommendation:  - Triple phase abdominal CT scan  - F/u labs and imaging  - Care per primary team  - Appreciate GI consult, ERCP likely on Monday  - Will d/w Dr. Bourne; further recommendations to follow    ALBERTO Savage, PGY-2  Surgical Oncology (D Team)  p. [phone] 53 year old woman w/ PMH sig for invasive ampullary adenocarcinoma who presents with fever, leukocytosis, and elevated t. bili. Patient clinically improving with medical management.    Plan/recommendation:  - MRI with contrast to evaluate liver lesion in segment 7  - F/u labs and imaging  - Care per primary team  - Appreciate GI consult, ERCP likely on Monday  - Will d/w Dr. Bourne; further recommendations to follow    ALBERTO Savage, PGY-2  Surgical Oncology (D Team)  p. [id number] 53 year old woman w/ PMH sig for invasive ampullary adenocarcinoma who presents with fever, leukocytosis, and elevated t. bili. Patient clinically improving with medical management.    Plan/recommendation:  - Continue fluid resuscitation, care per primary team  - MRI with contrast to evaluate liver lesion in segment 7  - F/u labs and imaging  - Appreciate GI consult, ERCP likely on Monday  - D/w Dr. Steffen Savage, PGY-2  Surgical Oncology (D Team)  p. [phone]

## 2019-01-05 NOTE — ED ADULT NURSE NOTE - OBJECTIVE STATEMENT
Pt brought into [facility]. A&ox4 ambulatory Czech speaking female presents to the ED today for abdominal pain. PMH abdominal CA 12/2018. Patient has been having ABD pain x3 days but today she has had numerous episodes of vomiting as well as worsening abd pain. Pain is generalized to her entire abdomin. Pain worsening with palpation. Family at bedside. MD at bedside.18G IV placed in right hand. Labs sent. PT placed on cardiac monitor and notes to be in NSR. Medicated as ordered for pain.

## 2019-01-05 NOTE — ED ADULT NURSE NOTE - PMH
Arthritis    Essential hypertension    Gastroesophageal reflux disease without esophagitis    Mild intermittent asthma without complication    Neuropathy    Type 2 diabetes mellitus without complication, without long-term current use of insulin

## 2019-01-05 NOTE — H&P ADULT - PROBLEM SELECTOR PLAN 1
- pt has RUQ pain, jaundice and fever, fulfilling charcot's traid   - Has a known tumor in the CBD and recent ERCP, put at increase risk fo cholangitis   - c/w Zosyn (started on 1/5)  - c/w IVF   - Appreciate GI recs: plan for ERCP on Monday

## 2019-01-05 NOTE — H&P ADULT - NSHPSOCIALHISTORY_GEN_ALL_CORE
Social History:    Marital Status:  (  x )    (   ) Single    (   )    (  )   Occupation: homemaker   Lives with: (  ) alone  (x  ) children   (  ) spouse   (  ) parents  (  ) other    Substance Use (street drugs): ( x ) never used  (  ) other:  Tobacco Usage:  (  x ) never smoked   (   ) former smoker   (   ) current smoker  (     ) pack year  (        ) last cigarette date  Alcohol Usage: non drinker   Sexual History:

## 2019-01-05 NOTE — ED ADULT NURSE NOTE - CHIEF COMPLAINT QUOTE
Pt c/o abd pain x few days worsened in past 5hrs. Also c/o N/V, chills, fever (101.8 tmax). DC from [place] with abd cancer 12/2018. Pt moaning, appears in distress.

## 2019-01-05 NOTE — H&P ADULT - PMH
Adenocarcinoma of gallbladder    Arthritis    Essential hypertension    Gastroesophageal reflux disease without esophagitis    Mild intermittent asthma without complication    Neuropathy    Type 2 diabetes mellitus without complication, without long-term current use of insulin

## 2019-01-05 NOTE — H&P ADULT - PROBLEM SELECTOR PLAN 3
- Recent Biopsy on 12/22 shows invasive adenocarcinoma in the CBD  - ERCP performed on 12/22 also placed a biliary stent   - Has not had oncology follow up due to insurance issues   - Will consider oncology consult, but no role at this time   - Apprecaite Surg onc recs, but patient not a candidate for surgery at this time due to infection

## 2019-01-05 NOTE — CONSULT NOTE ADULT - SUBJECTIVE AND OBJECTIVE BOX
CHIEF COMPLAINT: Abdominal pain    HPI:  54 y/o Female w/ a pmh significant for DM2, HTN, OA and asthma presenting to the ED w/ a chief complaint of abdominal pain. Pt recently discharged last month and was found to have CBD mass s/p biopsy now confirming invasive ampullary adenocarcinoma on biopsy report. She is presenting back to the ED today following acute onset abdominal pain of one days duration. Following discharge her abdominal pain was relatively well controlled however per family at bedside, she began complaining of agonal RUQ abdominal pain which she says was worse than before. Pt also had 1-2 episodes of NBNB emesis yesterday and has had decreased PO intake over the last 36hours 2/2 concern for abdominal pain. She tried taking Aleve without any relief of her symptoms prompting her visit to the ED.     In the ED, pt febrile (101.5F) BP /48-82, HR 74-98, RR 18 (100%RA). Labs notable for neutrophil predominant WBC 15.2, Alk phos 795, , ALT 60, Lactate 3.2.. Pt received Vanc and Zosyn x1, 1.75L IVF, and 1g Tylenol x1.     Of note, family at bedside is aware of biopsy results but patient has not yet been informed. Her daughters are awaiting pt brother to arrive before discussing the report with her.       PAST MEDICAL & SURGICAL HISTORY:  Type 2 diabetes mellitus without complication, without long-term current use of insulin  Neuropathy  Arthritis  Mild intermittent asthma without complication  Gastroesophageal reflux disease without esophagitis  Essential hypertension  No significant past surgical history      FAMILY HISTORY:  No pertinent family history in first degree relatives      SOCIAL HISTORY:  Smoking: [ ] Never Smoked [ ] Former Smoker (__ packs x ___ years) [ ] Current Smoker  (__ packs x ___ years)  Substance Use: [ ] Never Used [ ] Used ____  EtOH Use:  Marital Status: [ ] Single [ ]  [ ]  [ ]   Sexual History:   Occupation:  Recent Travel:  Country of Birth:  Advance Directives:    Allergies    No Known Allergies    Intolerances        HOME MEDICATIONS:    REVIEW OF SYSTEMS:  Constitutional: [ ] negative [ ] fevers [ ] chills [ ] weight loss [ ] weight gain  HEENT: [ ] negative [ ] dry eyes [ ] eye irritation [ ] postnasal drip [ ] nasal congestion  CV: [ ] negative  [ ] chest pain [ ] orthopnea [ ] palpitations [ ] murmur  Resp: [ ] negative [ ] cough [ ] shortness of breath [ ] dyspnea [ ] wheezing [ ] sputum [ ] hemoptysis  GI: [ ] negative [ ] nausea [ ] vomiting [ ] diarrhea [ ] constipation [ ] abd pain [ ] dysphagia   : [ ] negative [ ] dysuria [ ] nocturia [ ] hematuria [ ] increased urinary frequency  Musculoskeletal: [ ] negative [ ] back pain [ ] myalgias [ ] arthralgias [ ] fracture  Skin: [ ] negative [ ] rash [ ] itch  Neurological: [ ] negative [ ] headache [ ] dizziness [ ] syncope [ ] weakness [ ] numbness  Psychiatric: [ ] negative [ ] anxiety [ ] depression  Endocrine: [ ] negative [ ] diabetes [ ] thyroid problem  Hematologic/Lymphatic: [ ] negative [ ] anemia [ ] bleeding problem  Allergic/Immunologic: [ ] negative [ ] itchy eyes [ ] nasal discharge [ ] hives [ ] angioedema  [ ] All other systems negative  [ ] Unable to assess ROS because ________    OBJECTIVE:  ICU Vital Signs Last 24 Hrs  T(C): 37.6 (05 Jan 2019 07:54), Max: 38.6 (05 Jan 2019 05:00)  T(F): 99.7 (05 Jan 2019 07:54), Max: 101.5 (05 Jan 2019 05:00)  HR: 90 (05 Jan 2019 07:54) (74 - 98)  BP: 116/56 (05 Jan 2019 07:54) (84/48 - 116/56)  BP(mean): --  ABP: --  ABP(mean): --  RR: 16 (05 Jan 2019 07:54) (16 - 20)  SpO2: 100% (05 Jan 2019 07:54) (100% - 100%)        CAPILLARY BLOOD GLUCOSE          PHYSICAL EXAM:  General: Moderate distress, uncomfortable lying in bed  HEENT: NCAT PERRLA Anicteric  Respiratory: CTAB no r/r/w  Cardiovascular: RRR no m/r/g  Abdomen: Diffusely tender to palpation, specifically RUQ and epigastrum. + Guarding. +Hepatomegaly. Normoactive bowel sounds  Extremities: Warm well perfused. No cyanosis clubbing or edema.   Neurological: AAOx3. CN2-12 intact  no focal deficits    LINES:     HOSPITAL MEDICATIONS:                              LABS:                        11.0   15.21 )-----------( 273      ( 05 Jan 2019 04:18 )             34.9     Hgb Trend: 11.0<--  01-05    137  |  98  |  6<L>  ----------------------------<  175<H>  4.4   |  23  |  0.55    Ca    10.3      05 Jan 2019 04:18    TPro  8.1  /  Alb  4.0  /  TBili  2.2<H>  /  DBili  x   /  AST  122<H>  /  ALT  60<H>  /  AlkPhos  795<H>  01-05    Creatinine Trend: 0.55<--, 0.72<--, 0.64<--, 0.68<--, 0.70<--, 0.67<--  PT/INR - ( 05 Jan 2019 04:18 )   PT: 14.3 SEC;   INR: 1.25          PTT - ( 05 Jan 2019 04:18 )  PTT:25.6 SEC      Venous Blood Gas:  01-05 @ 07:20  7.39/41/29/23/46.3  VBG Lactate: 3.2  Venous Blood Gas:  01-05 @ 04:26  7.33/54/17/24/14.8  VBG Lactate: 4.4      MICROBIOLOGY:     RADIOLOGY:  [ ] Reviewed and interpreted by me    EKG:

## 2019-01-05 NOTE — H&P ADULT - NSHPREVIEWOFSYSTEMS_GEN_ALL_CORE
REVIEW OF SYSTEMS:  CONSTITUTIONAL: No chills, night sweats, or fatigue. + for fever   EYES: No eye pain, visual disturbances, or discharge  ENMT:  No difficulty hearing, tinnitus, vertigo; No sinus or throat pain  RESPIRATORY: No cough, wheezing, or hemoptysis; No shortness of breath  CARDIOVASCULAR: No chest pain, palpitations, dizziness, or leg swelling  GASTROINTESTINAL: + for abdominal pain, nausea, vomiting; No diarrhea or constipation. No melena or hematochezia.  GENITOURINARY: No dysuria, frequency, hematuria, or incontinence  NEUROLOGICAL: No headaches, memory loss, loss of strength, numbness, or tremors  SKIN: No itching, burning, rashes, or lesions   LYMPH NODES: No enlarged glands  ENDOCRINE: No heat or cold intolerance; No hair loss  MUSCULOSKELETAL: No joint pain or swelling; No muscle, back, or extremity pain  PSYCHIATRIC: No depression, anxiety, mood swings, or difficulty sleeping

## 2019-01-05 NOTE — H&P ADULT - NSHPLABSRESULTS_GEN_ALL_CORE
The Labs were reviewed by me   The Radiology was reviewed by me    EKG tracing reviewed by me        137  |  98  |  6<L>  ----------------------------<  175<H>  4.4   |  23  |  0.55    Ca    10.3      2019 04:18    TPro  8.1  /  Alb  4.0  /  TBili  2.2<H>  /  DBili  x   /  AST  122<H>  /  ALT  60<H>  /  AlkPhos  795<H>            PT/INR - ( 2019 04:18 )   PT: 14.3 SEC;   INR: 1.25          PTT - ( 2019 04:18 )  PTT:25.6 SEC              Urinalysis Basic - ( 2019 07:45 )    Color: YELLOW / Appearance: CLEAR / S.039 / pH: 8.0  Gluc: NEGATIVE / Ketone: NEGATIVE  / Bili: NEGATIVE / Urobili: NORMAL   Blood: NEGATIVE / Protein: 10 / Nitrite: NEGATIVE   Leuk Esterase: NEGATIVE / RBC: x / WBC x   Sq Epi: x / Non Sq Epi: x / Bacteria: x                              11.0   15.21 )-----------( 273      ( 2019 04:18 )             34.9     CAPILLARY BLOOD GLUCOSE      POCT Blood Glucose.: 181 mg/dL (2019 09:48)    Radiology:    < from: CT Abdomen and Pelvis w/ IV Cont (19 @ 06:42) >    IMPRESSION:     Intra and extrahepatic biliary ductal dilatation is again noted with   pneumobilia likely related to recent ERCP. Hyperattenuation and mild wall   thickening involving the CBD raises the possibility of cholangitis;   correlate with laboratory values. CBD stent located in the distal CBD and   duodenum.    Heterogeneity of the right hepatic lobe with questionable 1.3 cm   hypoattenuating lesion in segment 7.    Heterogeneous patchy hypoenhancement involving the right kidney;   correlate clinically for pyelonephritis.    < end of copied text >    EKG: Sinus tachycardia

## 2019-01-05 NOTE — ED PROVIDER NOTE - PHYSICAL EXAMINATION
Cathy Liu, DO:  Gen: Well appearing, NAD  Head: NCAT  HEENT: PERRL, MMM, normal conjunctiva, scleral icterus, neck supple  Lung: CTAB, no adventitious sounds  CV: RRR, no murmurs  Abd: soft, diffusely TTP with voluntary guarding, no rebound  MSK: No edema, no visible deformities  Neuro: Moving all extremities equally  Skin: Warm and dry, jaundiced  Psych: normal mood and affect

## 2019-01-05 NOTE — ED PROVIDER NOTE - CRITICAL CARE PROVIDED
additional history taking/consultation with other physicians/consult w/ pt's family directly relating to pts condition

## 2019-01-05 NOTE — CONSULT NOTE ADULT - ATTENDING COMMENTS
Patient seen and examined. Agree with above. The patient is now back to baseline with antibiotics. Would continue Zosyn and repeat liver tests, CBC later today and trend daily. She will likely need repeat ERCP with stent replacement on Monday, vs more emergently in the OR if her clinical status changes. Follow-up blood cultures. Would keep NPO for now - if stable for the rest of today and labs are improved, can advance to clears only. Patient seen and examined. Agree with above. The patient is now back to baseline with antibiotics. Would continue Zosyn and repeat liver tests, CBC later today and trend daily. She will likely need repeat ERCP with stent replacement on Monday, vs more emergently in the OR if her clinical status changes. Given pneumobilia seen on imaging, her stent is likely at least partially patent. Follow-up blood cultures. Would keep NPO for now - if stable for the rest of today and labs are improved, can advance to clears only.

## 2019-01-05 NOTE — H&P ADULT - NSHPPHYSICALEXAM_GEN_ALL_CORE
Vital Signs Last 24 Hrs  T(C): 37.2 (05 Jan 2019 14:08), Max: 38.6 (05 Jan 2019 05:00)  T(F): 99 (05 Jan 2019 14:08), Max: 101.5 (05 Jan 2019 05:00)  HR: 92 (05 Jan 2019 14:08) (69 - 98)  BP: 98/41 (05 Jan 2019 14:08) (84/48 - 116/56)  BP(mean): --  RR: 16 (05 Jan 2019 14:08) (16 - 20)  SpO2: 99% (05 Jan 2019 14:08) (99% - 100%)    PHYSICAL EXAM:  GENERAL: NAD, well-groomed, well-developed  EYES: EOMI, PERRLA, conjunctiva and sclera icterus  ENMT: No tonsillar erythema, exudates, or enlargement; Moist mucous membranes, Good dentition, No lesions  NECK: Supple, No JVD, Normal thyroid  CHEST/LUNG: Clear to percussion bilaterally; No rales, rhonchi, wheezing, or rubs  HEART: tachycardia; No murmurs, rubs, or gallops  ABDOMEN: Soft, tender to palpation at the RUQ and epigastric area, Nondistended; Bowel sounds present  VASCULAR:  2+ Peripheral Pulses, No clubbing, cyanosis, or edema  LYMPH: No lymphadenopathy noted  SKIN: No rashes or lesions  NERVOUS SYSTEM:  Alert & Oriented X3, Good concentration;

## 2019-01-05 NOTE — ED ADULT TRIAGE NOTE - CHIEF COMPLAINT QUOTE
Pt c/o abd pain x few days worsened in past 5hrs. Also c/o N/V, chills, fever (101.8 tmax). DC from [place] with abd cancer 12/2018. Pt moaning, appears in distress.

## 2019-01-05 NOTE — CONSULT NOTE ADULT - SUBJECTIVE AND OBJECTIVE BOX
[facility] Surgical Oncology Consultation       HPI:     53 year old woman with PMH sig for DM2, HTN, OA, and asthma presents to ED with worsening N/V and epigastric pain. 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 Pt was unable to follow up at [facility] due to her insurance not being accepted by [facility]. She is presenting back to the ED today following acute onset abdominal pain of one day duration. Following discharge her abdominal pain was relatively well controlled however per family at bedside, she began complaining of agonal RUQ abdominal pain which she says was worse than before. Pt also had 1-2 episodes of NBNB emesis yesterday and has had decreased PO intake over the last 36hours 2/2 concern for abdominal pain. She tried taking Aleve without any relief of her symptoms prompting her visit to the ED. She denies any fevers, chest pain, changes in bowel or bladder habits, or changes in skin color. However, she was febrile in ED this morning to 38.6 and was hypotensive to the 80s/40s in triage. She responded appropriately to fluids, acetaminophen, Vanc, and Zosyn.     Of note, family at bedside is aware of biopsy results but patient has not yet been informed. Her daughters are awaiting pt brother to arrive before discussing the report with her.       PAST MEDICAL & SURGICAL HISTORY:  Type 2 diabetes mellitus without complication, without long-term current use of insulin  Neuropathy  Arthritis  Mild intermittent asthma without complication  Gastroesophageal reflux disease without esophagitis  Essential hypertension  No significant past surgical history      ROS: 10-point review of systems   FAMILY HISTORY:  No pertinent family history in first degree relatives      SOCIAL HISTORY:    MEDICATIONS  (STANDING):    MEDICATIONS  (PRN):    Allergies    No Known Allergies    Intolerances        Vital Signs Last 24 Hrs  T(C): 37.6 (2019 09:20), Max: 38.6 (2019 05:00)  T(F): 99.6 (2019 09:20), Max: 101.5 (2019 05:00)  HR: 69 (2019 09:20) (69 - 98)  BP: 113/58 (2019 09:20) (84/48 - 116/56)  BP(mean): --  RR: 17 (2019 09:20) (16 - 20)  SpO2: 100% (2019 09:20) (100% - 100%)  Daily     Daily     Physical Exam:  General: Well developed, well nourished, No Acute Distress, uncomfortable  HEENT: Normocephalic Atraumatic, EOMI bl  Neurology: A&Ox3  Abdominal: Soft, RUQ tenderness, mild epigastric tenderness, moderately distended  Extremities: No Clubbing, cyanosis or edema, FROM  Skin: warm, dry, normal color, no rash or abnormal lesions    LABS:             11.0   15.21 )-----------( 273      ( 2019 04:18 )             34.9         137  |  98  |  6<L>  ----------------------------<  175<H>  4.4   |  23  |  0.55    Ca    10.3      2019 04:18    TPro  8.1  /  Alb  4.0  /  TBili  2.2<H>  /  DBili  x   /  AST  122<H>  /  ALT  60<H>  /  AlkPhos  795<H>      PT/INR - ( 2019 04:18 )   PT: 14.3 SEC;   INR: 1.25          PTT - ( 2019 04:18 )  PTT:25.6 SEC  Urinalysis Basic - ( 2019 07:45 )    Color: YELLOW / Appearance: CLEAR / S.039 / pH: 8.0  Gluc: NEGATIVE / Ketone: NEGATIVE  / Bili: NEGATIVE / Urobili: NORMAL   Blood Gas Venous - Lactate: 4.4: Please note updated reference range. mmol/L (19 @ 04:26)    Blood: NEGATIVE / Protein: 10 / Nitrite: NEGATIVE   Leuk Esterase: NEGATIVE / RBC: x / WBC x   Sq Epi: x / Non Sq Epi: x / Bacteria: x    Blood Gas Venous - Lactate: 3.2: Please note updated reference range. mmol/L (19 @ 07:20)  Blood Gas Venous - Lactate: 4.4: Please note updated reference range. mmol/L (19 @ 04:26)        Radiographic Findings:     < from: MR MRCP No Cont (18 @ 09:40) >  Findings: The common bile duct is dilated at 1.5cm in diameter. There is   an abrupt transition at the very distal common bile duct. This may be due   to presence of a common bile duct stone or cholangiocarcinoma. The main   pancreatic duct is mildly dilated as well measuring up to 4.7 mm at the   pancreatic head.    Gallstones in the gallbladder. No evidence for gallbladder wall   thickening. Trace pericholecystic fluid is noted adjacent to the   gallbladder neck.    Slight nodular contour of the liver may represent cirrhosis. No gross   focal hepatic mass lesion is identified on this noncontrast examination.    A few brightly T2 hyperintense lesions are seen in the pancreas measuring   up to 5.5 mm in the pancreatic tail. These lesions may be cystic.    Allowing for noncontrast technique, the spleen, adrenals and kidneys   appear grossly unremarkable. Extrarenal pelvises bilaterally.    No evidence for ascites, or grossly enlarged lymph node.    Impression: Dilated common bile duct with an abrupt transition at the   very distal common bile duct. This may be due to presence of a common   bile duct stone or cholangiocarcinoma. The main pancreatic duct is mildly   dilated as well. If clinically indicated, ERCP and endoscopic ultrasound   may be pursued for further evaluation.     Cholelithiasis with trace pericholecystic fluid. Possibility of acute   cholecystitis is considered. Clinical correlation is recommended. If   clinically indicated, HIDA scan may be pursued for further evaluation.    A few small brightly T2 hyperintenselesions in the pancreas may   represent IPMNs or nonspecific cystic neoplasms of pancreas. If   clinically indicated, follow-up abdominal MR may be pursued in 6-12   months to ensure stability.    < end of copied text >    < from: CT Abdomen and Pelvis w/ IV Cont (19 @ 06:42) >  FINDINGS:    LOWER CHEST: Subsegmental atelectasis in the lower lobes. Cardiophrenic   node measuring 1.5 x 1.1 cm (series 2 image 8).    LIVER: Normal morphology. Heterogeneous enhancement in the right lobe   with questionable hypodensity in segment 7 measuring 1.3 cm (series 2   image 20).  BILE DUCTS: Intrahepatic and extrahepatic biliary ductal dilatation; the   common bile duct measures up to 1.9 cm wall thickening and   hyperattenuation. The biliary duct catheter pigtails arein the distal   common bile duct and the small bowel at the level of the duodenal jejunal   flexure. Mild pneumobilia likely secondary to recent ERCP.  GALLBLADDER: Gallbladder wall edema cholelithiasis.  SPLEEN: Within normal limits.  PANCREAS: The main pancreatic duct measures up to 0.6 cm in the   pancreatic head, similar in appearance to the prior exam 2018.  ADRENALS: Within normal limits.  KIDNEYS/URETERS: Heterogeneous patchy hypoenhancement involving the right   midpole (series 2 image 31 and series 602 image 64). No hydronephrosis.    BLADDER: Within normal limits.  REPRODUCTIVE ORGANS: Unremarkable CT appearance of the uterus and adnexa.    BOWEL: No bowel obstruction. Appendix is normal.  PERITONEUM: No ascites or pneumoperitoneum.  VESSELS:  Normal caliber abdominal aorta.  RETROPERITONEUM: Prominent retroperitoneal nodes, a reference precarinal   node measures 1.0 cm (series 2 image 33).    ABDOMINAL WALL: Multiple subcutaneous nodules within the ventral abdomen.   For example, there is a 1.6 cm nodule in the left ventral abdomen (2, 76)   and a 1.6 cm nodule in the right ventral abdomen (2, 79), likely related   to injection granulomas; correlate clinically.  BONES: Mild degenerative changes of the spine.    IMPRESSION:     Intra and extrahepatic biliary ductal dilatation is again noted with   pneumobilia likely related to recent ERCP. Hyperattenuation and mild wall   thickening involving the CBD raises the possibility of cholangitis;   correlate with laboratory values. CBD stent located in the distal CBD and   duodenum.    Heterogeneity of the right hepatic lobe with questionable 1.3 cm   hypoattenuating lesion in segment 7.    Heterogeneous patchy hypoenhancement involving the right kidney;   correlate clinically for pyelonephritis.    < end of copied text > [facility] Surgical Oncology Consultation       HPI:     53 year old woman with PMH sig for DM2, HTN, OA, and asthma presents to ED with worsening N/V and epigastric pain. 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 Pt was unable to follow up at [facility] due to her insurance not being accepted by [facility]. She is presenting back to the ED today following acute onset abdominal pain of one day duration. Following discharge her abdominal pain was relatively well controlled however per family at bedside, she began complaining of agonal RUQ abdominal pain which she says was worse than before. Pt also had 1-2 episodes of NBNB emesis yesterday and has had decreased PO intake over the last 36hours 2/2 concern for abdominal pain. She tried taking Aleve without any relief of her symptoms prompting her visit to the ED. She denies any fevers, chest pain, changes in bowel or bladder habits, or changes in skin color. However, she was febrile in ED this morning to 38.6 and was hypotensive to the 80s/40s in triage. She responded appropriately to fluids, acetaminophen, Vanc, and Zosyn.     Of note, family at bedside is aware of biopsy results but patient has not yet been informed. Her daughters are awaiting pt brother to arrive before discussing the report with her.       PAST MEDICAL & SURGICAL HISTORY:  Type 2 diabetes mellitus without complication, without long-term current use of insulin  Neuropathy  Arthritis  Mild intermittent asthma without complication  Gastroesophageal reflux disease without esophagitis  Essential hypertension  No significant past surgical history      ROS: 10-point review of systems   FAMILY HISTORY:  No pertinent family history in first degree relatives      SOCIAL HISTORY:    MEDICATIONS  (STANDING):    MEDICATIONS  (PRN):    Allergies    No Known Allergies    Intolerances        Vital Signs Last 24 Hrs  T(C): 37.6 (2019 09:20), Max: 38.6 (2019 05:00)  T(F): 99.6 (2019 09:20), Max: 101.5 (2019 05:00)  HR: 69 (2019 09:20) (69 - 98)  BP: 113/58 (2019 09:20) (84/48 - 116/56)  BP(mean): --  RR: 17 (2019 09:20) (16 - 20)  SpO2: 100% (2019 09:20) (100% - 100%)  Daily     Daily     Physical Exam:  General: Well developed, well nourished, No Acute Distress, uncomfortable  HEENT: Normocephalic Atraumatic, EOMI bl  Neurology: A&Ox3  Abdominal: Soft, RUQ tenderness, mild epigastric tenderness, moderately distended  Extremities: No Clubbing, cyanosis or edema, FROM  Skin: warm, dry, normal color, no rash or abnormal lesions    LABS:             11.0   15.21 )-----------( 273      ( 2019 04:18 )             34.9         137  |  98  |  6<L>  ----------------------------<  175<H>  4.4   |  23  |  0.55    Ca    10.3      2019 04:18    TPro  8.1  /  Alb  4.0  /  TBili  2.2<H>  /  DBili  x   /  AST  122<H>  /  ALT  60<H>  /  AlkPhos  795<H>      PT/INR - ( 2019 04:18 )   PT: 14.3 SEC;   INR: 1.25     PTT - ( 2019 04:18 )  PTT:25.6 SEC    Cancer Antigen, GI Ca 19-9: 318.6 (18 @ 17:53)  Carcinoembryonic Antigen: 1.9 (18 @ 17:53)  Alpha Fetoprotein - Tumor Marker: 5.3 (18 @ 17:53)    Urinalysis Basic - ( 2019 07:45 )    Color: YELLOW / Appearance: CLEAR / S.039 / pH: 8.0  Gluc: NEGATIVE / Ketone: NEGATIVE  / Bili: NEGATIVE / Urobili: NORMAL   Blood Gas Venous - Lactate: 4.4: Please note updated reference range. mmol/L (19 @ 04:26)    Blood: NEGATIVE / Protein: 10 / Nitrite: NEGATIVE   Leuk Esterase: NEGATIVE / RBC: x / WBC x   Sq Epi: x / Non Sq Epi: x / Bacteria: x    Blood Gas Venous - Lactate: 3.2: Please note updated reference range. mmol/L (19 @ 07:20)  Blood Gas Venous - Lactate: 4.4: Please note updated reference range. mmol/L (19 @ 04:26)        Radiographic Findings:     < from: MR MRCP No Cont (18 @ 09:40) >  Findings: The common bile duct is dilated at 1.5cm in diameter. There is   an abrupt transition at the very distal common bile duct. This may be due   to presence of a common bile duct stone or cholangiocarcinoma. The main   pancreatic duct is mildly dilated as well measuring up to 4.7 mm at the   pancreatic head.    Gallstones in the gallbladder. No evidence for gallbladder wall   thickening. Trace pericholecystic fluid is noted adjacent to the   gallbladder neck.    Slight nodular contour of the liver may represent cirrhosis. No gross   focal hepatic mass lesion is identified on this noncontrast examination.    A few brightly T2 hyperintense lesions are seen in the pancreas measuring   up to 5.5 mm in the pancreatic tail. These lesions may be cystic.    Allowing for noncontrast technique, the spleen, adrenals and kidneys   appear grossly unremarkable. Extrarenal pelvises bilaterally.    No evidence for ascites, or grossly enlarged lymph node.    Impression: Dilated common bile duct with an abrupt transition at the   very distal common bile duct. This may be due to presence of a common   bile duct stone or cholangiocarcinoma. The main pancreatic duct is mildly   dilated as well. If clinically indicated, ERCP and endoscopic ultrasound   may be pursued for further evaluation.     Cholelithiasis with trace pericholecystic fluid. Possibility of acute   cholecystitis is considered. Clinical correlation is recommended. If   clinically indicated, HIDA scan may be pursued for further evaluation.    A few small brightly T2 hyperintenselesions in the pancreas may   represent IPMNs or nonspecific cystic neoplasms of pancreas. If   clinically indicated, follow-up abdominal MR may be pursued in 6-12   months to ensure stability.    < end of copied text >    < from: CT Abdomen and Pelvis w/ IV Cont (19 @ 06:42) >  FINDINGS:    LOWER CHEST: Subsegmental atelectasis in the lower lobes. Cardiophrenic   node measuring 1.5 x 1.1 cm (series 2 image 8).    LIVER: Normal morphology. Heterogeneous enhancement in the right lobe   with questionable hypodensity in segment 7 measuring 1.3 cm (series 2   image 20).  BILE DUCTS: Intrahepatic and extrahepatic biliary ductal dilatation; the   common bile duct measures up to 1.9 cm wall thickening and   hyperattenuation. The biliary duct catheter pigtails arein the distal   common bile duct and the small bowel at the level of the duodenal jejunal   flexure. Mild pneumobilia likely secondary to recent ERCP.  GALLBLADDER: Gallbladder wall edema cholelithiasis.  SPLEEN: Within normal limits.  PANCREAS: The main pancreatic duct measures up to 0.6 cm in the   pancreatic head, similar in appearance to the prior exam 2018.  ADRENALS: Within normal limits.  KIDNEYS/URETERS: Heterogeneous patchy hypoenhancement involving the right   midpole (series 2 image 31 and series 602 image 64). No hydronephrosis.    BLADDER: Within normal limits.  REPRODUCTIVE ORGANS: Unremarkable CT appearance of the uterus and adnexa.    BOWEL: No bowel obstruction. Appendix is normal.  PERITONEUM: No ascites or pneumoperitoneum.  VESSELS:  Normal caliber abdominal aorta.  RETROPERITONEUM: Prominent retroperitoneal nodes, a reference precarinal   node measures 1.0 cm (series 2 image 33).    ABDOMINAL WALL: Multiple subcutaneous nodules within the ventral abdomen.   For example, there is a 1.6 cm nodule in the left ventral abdomen (2, 76)   and a 1.6 cm nodule in the right ventral abdomen (2, 79), likely related   to injection granulomas; correlate clinically.  BONES: Mild degenerative changes of the spine.    IMPRESSION:     Intra and extrahepatic biliary ductal dilatation is again noted with   pneumobilia likely related to recent ERCP. Hyperattenuation and mild wall   thickening involving the CBD raises the possibility of cholangitis;   correlate with laboratory values. CBD stent located in the distal CBD and   duodenum.    Heterogeneity of the right hepatic lobe with questionable 1.3 cm   hypoattenuating lesion in segment 7.    Heterogeneous patchy hypoenhancement involving the right kidney;   correlate clinically for pyelonephritis.    < end of copied text >

## 2019-01-05 NOTE — H&P ADULT - ATTENDING COMMENTS
52 y/o woman with HTN, DM2, OA, asthma recent adm to [place] f/w CBD mass s/p biopsy, biliary stent, bx c/w adenocarcinoma, p/w abd Pain, fever, f/w intra/extrahepatic biliary dilatation, likely cholangitis, hypotensive with elevated lactate, MICU consulted however deemed not to warrant ICU care that this called, GI consulted, planned for ERCP, possibly will need urgently if clinically decompensates.    Given rising lactate, persistent hypotension despite aggressive fluid resuscitation, patient's prognosis is guarded.    On visit, patient reports severe RUQ pain slightly improved since admission. Mentating well. Denies SOB. Denies lightheadedness.    Daughter, nieces x 3 and friend at bedside.  is away on [place], trying to return as soon as possible.    No evidence of volume overload on exam. No peripheral edema. Lung exam CTAB.    Continue Zosyn. Just finished 1 L bolus of LR. Continue LR at 150 cc's/hour. Recheck lactate. If rising, will need MICU, GI to be re-called. Keep NPO for now given possible need for urgent ERCP. Can get ice chips.    Plan discussed with patient and family at bedside. 52 y/o woman with HTN, DM2, OA, asthma recent adm to [place] f/w CBD mass s/p biopsy, biliary stent, bx c/w adenocarcinoma, p/w abd Pain, fever, f/w intra/extrahepatic biliary dilatation, likely cholangitis, hypotensive with elevated lactate, MICU consulted however deemed not to warrant ICU care that this called, GI consulted, planned for ERCP, possibly will need urgently if clinically decompensates.    Given rising lactate, persistent hypotension despite aggressive fluid resuscitation, patient's prognosis is guarded.    On visit, patient reports severe RUQ pain slightly improved since admission. Mentating well. Denies SOB. Denies lightheadedness.    Daughter, nieces x 3 and friend at bedside.  is away on [place], trying to return as soon as possible.    No evidence of volume overload on exam. No peripheral edema. Lung exam CTAB.    Continue Zosyn. Just finished 1 L bolus of LR. Continue LR at 150 cc's/hour. Recheck lactate. If rising, will need MICU, GI to be re-called. Keep NPO for now given possible need for urgent ERCP. Can get ice chips.    Plan discussed with patient and family at bedside.    Nonurgently consult oncology for evaluation and treatment planning for recently diagnosed invasive ampullary adenocarcinoma. Has 1.3 cm questionable hypoattenuating lesion in segment 7 of R hepatic lobe.

## 2019-01-05 NOTE — ED PROVIDER NOTE - ATTENDING CONTRIBUTION TO CARE
54 y/o F with h/o HTN, GERD, DM, recent admission with diagnosis of CBD mass s/p EGD biopsy postive for invasive ampullary adenocarcinoma BIB family for abdominal pain.  Daughter and neice at bedside providing hx and translation.  They report pt has had this abdominal pain since discharge.  While they are aware of her recent diagnosis, they report that they have not told the pt yet.  Pain worsened this morning and associated with nausea and nbnb vomiting x 1 which is new.  Pt noted to be hypotensive in triage, febrile rectally here.  No new complaints - denies cp, cough, sob, urinary sxs, diarrhea, rash, leg pain or swelling.  Ill appearing, lying in stretcher, appears uncomfortable in pain, awake and alert.  Febrile, hypotensive to 89 sbp on arrival.  +sclear icterus.  Lungs cta bl.  Cards nl S1/S2, RRR, no MRG.  Abd soft with diffuse tenderness worse in epigastrum, no rebound or guarding.  No pedal edema or calf tenderness.  Plan for labs, ivfs, pain control, broad spectrum abx, pan cx, cxr, ua, ct abdpel.  Suspect cholangitis in setting of fever, hypotension with known cbd mass, eval for underlying pna, uti, other intraabs pathology - plan for surg/gi/icu consult.

## 2019-01-05 NOTE — CONSULT NOTE ADULT - SUBJECTIVE AND OBJECTIVE BOX
Chief Complaint:  Patient is a 53y old  Female who presents with a chief complaint of Abdominal pain (2019 08:19)       HPI: 54 y/o Female w/ a pmh significant for DM2, HTN, OA and asthma presenting to the ED w/ a chief complaint of abdominal pain. 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 She is presenting back to the ED today following acute onset abdominal pain of one days duration. Following discharge her abdominal pain was relatively well controlled however per family at bedside, she began complaining of agonal RUQ abdominal pain which she says was worse than before. Pt also had 1-2 episodes of NBNB emesis yesterday and has had decreased PO intake over the last 36hours 2/2 concern for abdominal pain. She tried taking Aleve without any relief of her symptoms prompting her visit to the ED.     In the ED, pt febrile (101.5F) BP /48-82, HR 74-98, RR 18 (100%RA). Labs notable for neutrophil predominant WBC 15.2, Alk phos 795, , ALT 60, Lactate 3.2.. Pt received Vanc and Zosyn x1, 1.75L IVF, and 1g Tylenol x1.     Of note, family at bedside is aware of biopsy results but patient has not yet been informed. Her daughters are awaiting pt brother to arrive before discussing the report with her.       Allergies:  No Known Allergies      Home Medications:    Hospital Medications:      PMHX/PSHX:  Type 2 diabetes mellitus without complication, without long-term current use of insulin  Neuropathy  Arthritis  Mild intermittent asthma without complication  Gastroesophageal reflux disease without esophagitis  Essential hypertension  No pertinent past medical history  No significant past surgical history      Family history:  No pertinent family history in first degree relatives      There is no family history of peptic ulcer disease, gastric cancer, colon polyps, colon cancer, celiac disease, biliary, hepatic, or pancreatic disease.  None of the female relatives have breast, uterine, or ovarian cancer.     Social History:     ROS:     General:  No wt loss, fevers, chills, night sweats, fatigue,   Eyes:  Good vision, no reported pain  ENT:  No sore throat, pain, runny nose, dysphagia  CV:  No pain, palpitations, hypo/hypertension  Resp:  No dyspnea, cough, tachypnea, wheezing  GI:  No pain, No nausea, No vomiting, No diarrhea, No constipation, No weight loss, No fever, No pruritis, No rectal bleeding, No tarry stools, No dysphagia,  :  No pain, bleeding, incontinence, nocturia  Muscle:  No pain, weakness  Neuro:  No weakness, tingling, memory problems  Psych:  No fatigue, insomnia, mood problems, depression  Endocrine:  No polyuria, polydipsia, cold/heat intolerance  Heme:  No petechiae, ecchymosis, easy bruisability  Skin:  No rash, tattoos, scars, edema      PHYSICAL EXAM:     GENERAL:  Appears stated age, well-groomed, well-nourished, no distress  HEENT:  NC/AT,  conjunctivae clear and pink, no thyromegaly, nodules, adenopathy, no JVD, sclera -anicteric  CHEST:  Full & symmetric excursion, no increased effort, breath sounds clear  HEART:  Regular rhythm, S1, S2, no murmur/rub/S3/S4, no abdominal bruit, no edema  ABDOMEN:  Soft, non-tender, non-distended, normoactive bowel sounds,  no masses ,no hepato-splenomegaly, no signs of chronic liver disease  EXTEREMITIES:  no cyanosis,clubbing or edema  SKIN:  No rash/erythema/ecchymoses/petechiae/wounds/abscess/warm/dry  NEURO:  Alert, oriented, no asterixis, no tremor, no encephalopathy    Vital Signs:  Vital Signs Last 24 Hrs  T(C): 37.6 (2019 09:20), Max: 38.6 (2019 05:00)  T(F): 99.6 (2019 09:20), Max: 101.5 (2019 05:00)  HR: 69 (2019 09:20) (69 - 98)  BP: 113/58 (2019 09:20) (84/48 - 116/56)  BP(mean): --  RR: 17 (2019 09:20) (16 - 20)  SpO2: 100% (2019 09:20) (100% - 100%)  Daily     Daily     LABS:                        11.0   15.21 )-----------( 273      ( 2019 04:18 )             34.9     Mean Cell Volume: 78.4 fL (19 @ 04:18)        137  |  98  |  6<L>  ----------------------------<  175<H>  4.4   |  23  |  0.55    Ca    10.3      2019 04:18    TPro  8.1  /  Alb  4.0  /  TBili  2.2<H>  /  DBili  x   /  AST  122<H>  /  ALT  60<H>  /  AlkPhos  795<H>      LIVER FUNCTIONS - ( 2019 04:18 )  Alb: 4.0 g/dL / Pro: 8.1 g/dL / ALK PHOS: 795 u/L / ALT: 60 u/L / AST: 122 u/L / GGT: x           PT/INR - ( 2019 04:18 )   PT: 14.3 SEC;   INR: 1.25          PTT - ( 2019 04:18 )  PTT:25.6 SEC  Urinalysis Basic - ( 2019 07:45 )    Color: YELLOW / Appearance: CLEAR / S.039 / pH: 8.0  Gluc: NEGATIVE / Ketone: NEGATIVE  / Bili: NEGATIVE / Urobili: NORMAL   Blood: NEGATIVE / Protein: 10 / Nitrite: NEGATIVE   Leuk Esterase: NEGATIVE / RBC: x / WBC x   Sq Epi: x / Non Sq Epi: x / Bacteria: x                              11.0   15.21 )-----------( 273      ( 2019 04:18 )             34.9     Imaging: Chief Complaint:  Patient is a 53y old  Female who presents with a chief complaint of Abdominal pain (2019 08:19)       HPI: 54 y/o Female w/ a pmh significant for DM2, HTN, OA and asthma presenting to the ED w/ a chief complaint of abdominal pain. Pt recently discharged from [place] (-), at that time was admitted for dizziness and fall, found to have elevated liver enzymes (tbili 2.3, ALP in the 500s) and CBD dilatation up to 12mm and cholelithiasis on US, MRCP showing dilated common bile duct with an abrupt transition at the very distal common bile duct, concerning for bile duct stone or cholangiocarcinoma, s/p ERCP with plastic stent placement and biopsy confirming invasive ampullary adenocarcinoma. Pt was discharged home and was told to follow with surgical oncology for management. Pt presented to the ED today with acute onset RUQ and RLQ abdominal pain x1 day that is constant and crampy, followed by 2 episodes of NBNB emesis and decreased PO intake. Pt also has been having intermittent fevers for the past few days. In the ED, patient was found be febrile to 101.5, hypotensive initially to 80s/40s, labs notable for WBC of 15.2, , Tbili 2.2, AST//60. Pt s/p Vanc and zosyn.     Repeat CT showed intra and extrahepatic biliary ductal dilatation with pneumobilia likely related to recent ERCP, also hyperattenuation and mild wall thickening involving CBD raises possibility of cholangitis, CBD (pigtail) stent located in distal CBD and duodenum.       Allergies:  No Known Allergies      Home Medications:    Hospital Medications:      PMHX/PSHX:  Type 2 diabetes mellitus without complication, without long-term current use of insulin  Neuropathy  Arthritis  Mild intermittent asthma without complication  Gastroesophageal reflux disease without esophagitis  Essential hypertension  No pertinent past medical history  No significant past surgical history      Family history:  No pertinent family history in first degree relatives      There is no family history of peptic ulcer disease, gastric cancer, colon polyps, colon cancer, celiac disease, biliary, hepatic, or pancreatic disease.  None of the female relatives have breast, uterine, or ovarian cancer.     Social History: Denies alcohol, cigarette drug use.     ROS:     General:  No wt loss, fevers, chills, night sweats, fatigue,   Eyes:  Good vision, no reported pain  ENT:  No sore throat, pain, runny nose, dysphagia  CV:  No pain, palpitations, hypo/hypertension  Resp:  No dyspnea, cough, tachypnea, wheezing  GI:  see HPI  :  No pain, bleeding, incontinence, nocturia  Muscle:  No pain, weakness  Neuro:  No weakness, tingling, memory problems  Psych:  No fatigue, insomnia, mood problems, depression  Endocrine:  No polyuria, polydipsia, cold/heat intolerance  Heme:  No petechiae, ecchymosis, easy bruisability  Skin:  No rash, tattoos, scars, edema      PHYSICAL EXAM:     GENERAL:  Appears stated age, well-groomed  HEENT:  NC/AT,  conjunctivae clear and pink, no thyromegaly  CHEST:  Full & symmetric excursion, no increased effort, breath sounds clear  HEART:  Regular rhythm, S1, S2, no murmur/rub/S3/S4  ABDOMEN:  Soft, TTP at RUQ and RLQ, non-distended, normoactive bowel sounds  EXTEREMITIES:  no cyanosis,clubbing or edema  SKIN:  No rash/erythema/ecchymoses/petechiae/wounds/abscess/warm/dry  NEURO:  Alert, oriented    Vital Signs:  Vital Signs Last 24 Hrs  T(C): 37.6 (2019 09:20), Max: 38.6 (2019 05:00)  T(F): 99.6 (2019 09:20), Max: 101.5 (2019 05:00)  HR: 69 (2019 09:20) (69 - 98)  BP: 113/58 (2019 09:20) (84/48 - 116/56)  BP(mean): --  RR: 17 (2019 09:20) (16 - 20)  SpO2: 100% (2019 09:20) (100% - 100%)  Daily     Daily     LABS:                        11.0   15.21 )-----------( 273      ( 2019 04:18 )             34.9     Mean Cell Volume: 78.4 fL (19 @ 04:18)        137  |  98  |  6<L>  ----------------------------<  175<H>  4.4   |  23  |  0.55    Ca    10.3      2019 04:18    TPro  8.1  /  Alb  4.0  /  TBili  2.2<H>  /  DBili  x   /  AST  122<H>  /  ALT  60<H>  /  AlkPhos  795<H>  -    LIVER FUNCTIONS - ( 2019 04:18 )  Alb: 4.0 g/dL / Pro: 8.1 g/dL / ALK PHOS: 795 u/L / ALT: 60 u/L / AST: 122 u/L / GGT: x           PT/INR - ( 2019 04:18 )   PT: 14.3 SEC;   INR: 1.25          PTT - ( 2019 04:18 )  PTT:25.6 SEC  Urinalysis Basic - ( 2019 07:45 )    Color: YELLOW / Appearance: CLEAR / S.039 / pH: 8.0  Gluc: NEGATIVE / Ketone: NEGATIVE  / Bili: NEGATIVE / Urobili: NORMAL   Blood: NEGATIVE / Protein: 10 / Nitrite: NEGATIVE   Leuk Esterase: NEGATIVE / RBC: x / WBC x   Sq Epi: x / Non Sq Epi: x / Bacteria: x                              11.0   15.21 )-----------( 273      ( 2019 04:18 )             34.9     Imaging:      < from: CT Abdomen and Pelvis w/ IV Cont (19 @ 06:42) >  LIVER: Normal morphology. Heterogeneous enhancement in the right lobe   with questionable hypodensity in segment 7 measuring 1.3 cm (series 2   image 20).  BILE DUCTS: Intrahepatic and extrahepatic biliary ductal dilatation; the   common bile duct measures up to 1.9 cm wall thickening and   hyperattenuation. The biliary duct catheter pigtails arein the distal   common bile duct and the small bowel at the level of the duodenal jejunal   flexure. Mild pneumobilia likely secondary to recent ERCP.  GALLBLADDER: Gallbladder wall edema cholelithiasis.  SPLEEN: Within normal limits.  PANCREAS: The main pancreatic duct measures up to 0.6 cm in the   pancreatic head, similar in appearance to the prior exam 2018.  ADRENALS: Within normal limits.  KIDNEYS/URETERS: Heterogeneous patchy hypoenhancement involving the right   midpole (series 2 image 31 and series 602 image 64). No hydronephrosis.    BLADDER: Within normal limits.  REPRODUCTIVE ORGANS: Unremarkable CT appearance of the uterus and adnexa.    BOWEL: No bowel obstruction. Appendix is normal.  PERITONEUM: No ascites or pneumoperitoneum.  VESSELS:  Normal caliber abdominal aorta.  RETROPERITONEUM: Prominent retroperitoneal nodes, a reference precarinal   node measures 1.0 cm (series 2 image 33).    ABDOMINAL WALL: Multiple subcutaneous nodules within the ventral abdomen.   For example, there is a 1.6 cm nodule in the left ventral abdomen (2, 76)   and a 1.6 cm nodule in the right ventral abdomen (2, 79), likely related   to injection granulomas; correlate clinically.  BONES: Mild degenerative changes of the spine.    IMPRESSION:     Intra and extrahepatic biliary ductal dilatation is again noted with   pneumobilia likely related to recent ERCP. Hyperattenuation and mild wall   thickening involving the CBD raises the possibility of cholangitis;   correlate with laboratory values. CBD stent located in the distal CBD and   duodenum.    Heterogeneity of the right hepatic lobe with questionable 1.3 cm   hypoattenuating lesion in segment 7.    Heterogeneous patchy hypoenhancement involving the right kidney;   correlate clinically for pyelonephritis.        < end of copied text > Chief Complaint:  Patient is a 53y old  Female who presents with a chief complaint of Abdominal pain (2019 08:19)       HPI: 54 y/o Female w/ a pmh significant for DM2, HTN, OA and asthma presenting to the ED w/ a chief complaint of abdominal pain. Pt recently discharged from [place] (-), at that time was admitted for dizziness and fall, found to have elevated liver enzymes (tbili 2.3, ALP in the 500s) and CBD dilatation up to 12mm and cholelithiasis on US, MRCP showing dilated common bile duct with an abrupt transition at the very distal common bile duct, concerning for bile duct stone or cholangiocarcinoma, s/p ERCP with plastic stent placement and biopsy confirming invasive ampullary adenocarcinoma. Pt was discharged home and was told to follow with surgical oncology for management. She has some chronic abdominal upper quadrant pain since prior to the last hospitalization at [place]. Pt presented to the ED today with acute onset RUQ and RLQ abdominal pain x1 day that is constant and crampy, followed by 2 episodes of NBNB emesis and decreased PO intake. Pt also has been having intermittent fevers for the past few days. In the ED, patient was found be febrile to 101.5, hypotensive initially to 80s/40s, labs notable for WBC of 15.2, , Tbili 2.2, AST//60. Pt s/p Vanc and zosyn. Due to insurance issues, they have not had follow-up with oncology or surgical oncology yet.    Repeat CT showed intra and extrahepatic biliary ductal dilatation with pneumobilia likely related to recent ERCP, also hyperattenuation and mild wall thickening involving CBD raises possibility of cholangitis, CBD (pigtail) stent located in distal CBD and duodenum. Currently patient feels better and has no further fevers. She has the chronic abdominal pain which is present even prior to the last admission and is unchanged.      Allergies:  No Known Allergies      Home Medications:    Hospital Medications:      PMHX/PSHX:  Type 2 diabetes mellitus without complication, without long-term current use of insulin  Neuropathy  Arthritis  Mild intermittent asthma without complication  Gastroesophageal reflux disease without esophagitis  Essential hypertension  No pertinent past medical history  No significant past surgical history      Family history:  No pertinent family history in first degree relatives      There is no family history of peptic ulcer disease, gastric cancer, colon polyps, colon cancer, celiac disease, biliary, hepatic, or pancreatic disease.  None of the female relatives have breast, uterine, or ovarian cancer.     Social History: Denies alcohol, cigarette drug use.     ROS:     General:  No wt loss, chills, night sweats, fatigue; Fevers  Eyes:  Good vision, no reported pain  ENT:  No sore throat, pain, runny nose, dysphagia  CV:  No pain, palpitations, hypo/hypertension  Resp:  No dyspnea, cough, tachypnea, wheezing  GI:  see HPI  :  No pain, bleeding, incontinence, nocturia  Muscle:  No pain, weakness  Neuro:  No weakness, tingling, memory problems  Psych:  No fatigue, insomnia, mood problems, depression  Endocrine:  No polyuria, polydipsia, cold/heat intolerance  Heme:  No petechiae, ecchymosis, easy bruisability  Skin:  No rash, tattoos, scars, edema      PHYSICAL EXAM:     GENERAL:  Appears stated age, well-groomed  HEENT:  NC/AT,  conjunctivae clear and pink, no thyromegaly  CHEST:  Full & symmetric excursion, no increased effort, breath sounds clear  HEART:  Regular rhythm, S1, S2, no murmur/rub/S3/S4  ABDOMEN:  Soft, TTP at RUQ and RLQ, non-distended, normoactive bowel sounds  EXTEREMITIES:  no cyanosis,clubbing or edema  SKIN:  No rash/erythema/ecchymoses/petechiae/wounds/abscess/warm/dry  NEURO:  Alert, oriented    Vital Signs:  Vital Signs Last 24 Hrs  T(C): 37.6 (2019 09:20), Max: 38.6 (2019 05:00)  T(F): 99.6 (2019 09:20), Max: 101.5 (2019 05:00)  HR: 69 (2019 09:20) (69 - 98)  BP: 113/58 (2019 09:20) (84/48 - 116/56)  BP(mean): --  RR: 17 (2019 09:20) (16 - 20)  SpO2: 100% (2019 09:20) (100% - 100%)  Daily     Daily     LABS:                        11.0   15.21 )-----------( 273      ( 2019 04:18 )             34.9     Mean Cell Volume: 78.4 fL (01-05-19 @ 04:18)        137  |  98  |  6<L>  ----------------------------<  175<H>  4.4   |  23  |  0.55    Ca    10.3      2019 04:18    TPro  8.1  /  Alb  4.0  /  TBili  2.2<H>  /  DBili  x   /  AST  122<H>  /  ALT  60<H>  /  AlkPhos  795<H>      LIVER FUNCTIONS - ( 2019 04:18 )  Alb: 4.0 g/dL / Pro: 8.1 g/dL / ALK PHOS: 795 u/L / ALT: 60 u/L / AST: 122 u/L / GGT: x           PT/INR - ( 2019 04:18 )   PT: 14.3 SEC;   INR: 1.25          PTT - ( 2019 04:18 )  PTT:25.6 SEC  Urinalysis Basic - ( 2019 07:45 )    Color: YELLOW / Appearance: CLEAR / S.039 / pH: 8.0  Gluc: NEGATIVE / Ketone: NEGATIVE  / Bili: NEGATIVE / Urobili: NORMAL   Blood: NEGATIVE / Protein: 10 / Nitrite: NEGATIVE   Leuk Esterase: NEGATIVE / RBC: x / WBC x   Sq Epi: x / Non Sq Epi: x / Bacteria: x                              11.0   15.21 )-----------( 273      ( 2019 04:18 )             34.9     Imaging:      < from: CT Abdomen and Pelvis w/ IV Cont (19 @ 06:42) >  LIVER: Normal morphology. Heterogeneous enhancement in the right lobe   with questionable hypodensity in segment 7 measuring 1.3 cm (series 2   image 20).  BILE DUCTS: Intrahepatic and extrahepatic biliary ductal dilatation; the   common bile duct measures up to 1.9 cm wall thickening and   hyperattenuation. The biliary duct catheter pigtails arein the distal   common bile duct and the small bowel at the level of the duodenal jejunal   flexure. Mild pneumobilia likely secondary to recent ERCP.  GALLBLADDER: Gallbladder wall edema cholelithiasis.  SPLEEN: Within normal limits.  PANCREAS: The main pancreatic duct measures up to 0.6 cm in the   pancreatic head, similar in appearance to the prior exam 2018.  ADRENALS: Within normal limits.  KIDNEYS/URETERS: Heterogeneous patchy hypoenhancement involving the right   midpole (series 2 image 31 and series 602 image 64). No hydronephrosis.    BLADDER: Within normal limits.  REPRODUCTIVE ORGANS: Unremarkable CT appearance of the uterus and adnexa.    BOWEL: No bowel obstruction. Appendix is normal.  PERITONEUM: No ascites or pneumoperitoneum.  VESSELS:  Normal caliber abdominal aorta.  RETROPERITONEUM: Prominent retroperitoneal nodes, a reference precarinal   node measures 1.0 cm (series 2 image 33).    ABDOMINAL WALL: Multiple subcutaneous nodules within the ventral abdomen.   For example, there is a 1.6 cm nodule in the left ventral abdomen (2, 76)   and a 1.6 cm nodule in the right ventral abdomen (2, 79), likely related   to injection granulomas; correlate clinically.  BONES: Mild degenerative changes of the spine.    IMPRESSION:     Intra and extrahepatic biliary ductal dilatation is again noted with   pneumobilia likely related to recent ERCP. Hyperattenuation and mild wall   thickening involving the CBD raises the possibility of cholangitis;   correlate with laboratory values. CBD stent located in the distal CBD and   duodenum.    Heterogeneity of the right hepatic lobe with questionable 1.3 cm   hypoattenuating lesion in segment 7.    Heterogeneous patchy hypoenhancement involving the right kidney;   correlate clinically for pyelonephritis.        < end of copied text >

## 2019-01-05 NOTE — CONSULT NOTE ADULT - ASSESSMENT
52 y/o Female w/ a pmh significant for DM2, HTN, OA and asthma presenting with acute onset abdominal pain in the setting of newly diagnosed invasive ampullary adenocarcinoma complicated by sepsis likely 2/2 intraabdominal source. MICU consulted for hypotension      #Hypotension  - Likely secondary to vasoplegia in the setting of sepsis 2/2 possible intraabdominal infection (?Ascending cholangitis vs. ?obstructive mass)  - Initially hypotensive to 86/48 on presentation s/p 1.75L and Vanc/Zosyn in the ED w/ improvement to SBPs ~116-120's. Mentating appropriately. Lactate downtrending  - POCUS shows A line pattern anterior lung fields bilaterally w/ trace B line in R posterior lung fields. Cardiac windows show grossly intact LV systolic function and no evidence of pericardial effusion. IVC wnl.   - Given improvement in hemodynamics following IVF hydration, recommend c/w treatment of underlying sepsis w/ IVF (500cc at a time and reassess volume status) and abx. f/u GI and Surgery recs for possible intervention  - Pt does not warrant ICU level of care at this time.     Manny Bynum MD   Internal Medicine Resident, PGY-2

## 2019-01-05 NOTE — CONSULT NOTE ADULT - ATTENDING COMMENTS
Appears resectable on imaging.  MRI liver to evaluate segment 7 lesion  Treat cholangitis - 2 weeks abx  Follow-up in office for discussion of surgical risks, benefits, alternatives.

## 2019-01-05 NOTE — ED PROVIDER NOTE - OBJECTIVE STATEMENT
Cathy Liu, DO: 53F with hx of HTN, GERD, Asthma, DM found discharged 12/24 s/p biopsy for CBD mass found to have invasive ampullary adenocarcinoma on biopsy report here for persistent abdominal pain peaking tonight, now with vomiting. No fevers, chest pain, SOB, diarrhea. Patient hypotensive in triage to 80s/40s.

## 2019-01-06 ENCOUNTER — TRANSCRIPTION ENCOUNTER (OUTPATIENT)
Age: 54
End: 2019-01-06

## 2019-01-06 PROBLEM — K21.9 GASTRO-ESOPHAGEAL REFLUX DISEASE WITHOUT ESOPHAGITIS: Chronic | Status: ACTIVE | Noted: 2018-12-19

## 2019-01-06 PROBLEM — G62.9 POLYNEUROPATHY, UNSPECIFIED: Chronic | Status: ACTIVE | Noted: 2018-12-19

## 2019-01-06 PROBLEM — E11.9 TYPE 2 DIABETES MELLITUS WITHOUT COMPLICATIONS: Chronic | Status: ACTIVE | Noted: 2018-12-19

## 2019-01-06 PROBLEM — J45.20 MILD INTERMITTENT ASTHMA, UNCOMPLICATED: Chronic | Status: ACTIVE | Noted: 2018-12-19

## 2019-01-06 PROBLEM — M19.90 UNSPECIFIED OSTEOARTHRITIS, UNSPECIFIED SITE: Chronic | Status: ACTIVE | Noted: 2018-12-19

## 2019-01-06 LAB
ALBUMIN SERPL ELPH-MCNC: 2.7 G/DL — LOW (ref 3.3–5)
ALP SERPL-CCNC: 537 U/L — HIGH (ref 40–120)
ALT FLD-CCNC: 62 U/L — HIGH (ref 4–33)
AST SERPL-CCNC: 93 U/L — HIGH (ref 4–32)
BASOPHILS # BLD AUTO: 0.03 K/UL — SIGNIFICANT CHANGE UP (ref 0–0.2)
BASOPHILS NFR BLD AUTO: 0.3 % — SIGNIFICANT CHANGE UP (ref 0–2)
BILIRUB SERPL-MCNC: 3.9 MG/DL — HIGH (ref 0.2–1.2)
BUN SERPL-MCNC: 6 MG/DL — LOW (ref 7–23)
CALCIUM SERPL-MCNC: 9.2 MG/DL — SIGNIFICANT CHANGE UP (ref 8.4–10.5)
CHLORIDE SERPL-SCNC: 104 MMOL/L — SIGNIFICANT CHANGE UP (ref 98–107)
CO2 SERPL-SCNC: 25 MMOL/L — SIGNIFICANT CHANGE UP (ref 22–31)
CREAT SERPL-MCNC: 0.74 MG/DL — SIGNIFICANT CHANGE UP (ref 0.5–1.3)
EOSINOPHIL # BLD AUTO: 0.07 K/UL — SIGNIFICANT CHANGE UP (ref 0–0.5)
EOSINOPHIL NFR BLD AUTO: 0.6 % — SIGNIFICANT CHANGE UP (ref 0–6)
GLUCOSE BLDC GLUCOMTR-MCNC: 104 MG/DL — HIGH (ref 70–99)
GLUCOSE BLDC GLUCOMTR-MCNC: 105 MG/DL — HIGH (ref 70–99)
GLUCOSE BLDC GLUCOMTR-MCNC: 113 MG/DL — HIGH (ref 70–99)
GLUCOSE SERPL-MCNC: 113 MG/DL — HIGH (ref 70–99)
HCT VFR BLD CALC: 27.7 % — LOW (ref 34.5–45)
HGB BLD-MCNC: 8.8 G/DL — LOW (ref 11.5–15.5)
IMM GRANULOCYTES NFR BLD AUTO: 0.4 % — SIGNIFICANT CHANGE UP (ref 0–1.5)
LACTATE SERPL-SCNC: 1.4 MMOL/L — SIGNIFICANT CHANGE UP (ref 0.5–2)
LYMPHOCYTES # BLD AUTO: 2.22 K/UL — SIGNIFICANT CHANGE UP (ref 1–3.3)
LYMPHOCYTES # BLD AUTO: 20.6 % — SIGNIFICANT CHANGE UP (ref 13–44)
MAGNESIUM SERPL-MCNC: 1.7 MG/DL — SIGNIFICANT CHANGE UP (ref 1.6–2.6)
MCHC RBC-ENTMCNC: 24.9 PG — LOW (ref 27–34)
MCHC RBC-ENTMCNC: 31.8 % — LOW (ref 32–36)
MCV RBC AUTO: 78.5 FL — LOW (ref 80–100)
MONOCYTES # BLD AUTO: 0.61 K/UL — SIGNIFICANT CHANGE UP (ref 0–0.9)
MONOCYTES NFR BLD AUTO: 5.7 % — SIGNIFICANT CHANGE UP (ref 2–14)
NEUTROPHILS # BLD AUTO: 7.8 K/UL — HIGH (ref 1.8–7.4)
NEUTROPHILS NFR BLD AUTO: 72.4 % — SIGNIFICANT CHANGE UP (ref 43–77)
NRBC # FLD: 0 K/UL — LOW (ref 25–125)
PHOSPHATE SERPL-MCNC: 3.1 MG/DL — SIGNIFICANT CHANGE UP (ref 2.5–4.5)
PLATELET # BLD AUTO: 203 K/UL — SIGNIFICANT CHANGE UP (ref 150–400)
PMV BLD: 10.9 FL — SIGNIFICANT CHANGE UP (ref 7–13)
POTASSIUM SERPL-MCNC: 3.5 MMOL/L — SIGNIFICANT CHANGE UP (ref 3.5–5.3)
POTASSIUM SERPL-SCNC: 3.5 MMOL/L — SIGNIFICANT CHANGE UP (ref 3.5–5.3)
PROT SERPL-MCNC: 5.9 G/DL — LOW (ref 6–8.3)
RBC # BLD: 3.53 M/UL — LOW (ref 3.8–5.2)
RBC # FLD: 15.3 % — HIGH (ref 10.3–14.5)
SODIUM SERPL-SCNC: 140 MMOL/L — SIGNIFICANT CHANGE UP (ref 135–145)
SPECIMEN SOURCE: SIGNIFICANT CHANGE UP
SPECIMEN SOURCE: SIGNIFICANT CHANGE UP
WBC # BLD: 10.77 K/UL — HIGH (ref 3.8–10.5)
WBC # FLD AUTO: 10.77 K/UL — HIGH (ref 3.8–10.5)

## 2019-01-06 PROCEDURE — 99223 1ST HOSP IP/OBS HIGH 75: CPT | Mod: GC

## 2019-01-06 PROCEDURE — 99233 SBSQ HOSP IP/OBS HIGH 50: CPT | Mod: GC

## 2019-01-06 PROCEDURE — 99222 1ST HOSP IP/OBS MODERATE 55: CPT

## 2019-01-06 RX ORDER — TRAMADOL HYDROCHLORIDE 50 MG/1
50 TABLET ORAL EVERY 8 HOURS
Qty: 0 | Refills: 0 | Status: DISCONTINUED | OUTPATIENT
Start: 2019-01-06 | End: 2019-01-11

## 2019-01-06 RX ADMIN — GABAPENTIN 100 MILLIGRAM(S): 400 CAPSULE ORAL at 06:50

## 2019-01-06 RX ADMIN — GABAPENTIN 100 MILLIGRAM(S): 400 CAPSULE ORAL at 22:23

## 2019-01-06 RX ADMIN — GABAPENTIN 100 MILLIGRAM(S): 400 CAPSULE ORAL at 14:07

## 2019-01-06 RX ADMIN — PIPERACILLIN AND TAZOBACTAM 25 GRAM(S): 4; .5 INJECTION, POWDER, LYOPHILIZED, FOR SOLUTION INTRAVENOUS at 06:50

## 2019-01-06 RX ADMIN — SODIUM CHLORIDE 150 MILLILITER(S): 9 INJECTION, SOLUTION INTRAVENOUS at 06:50

## 2019-01-06 RX ADMIN — PIPERACILLIN AND TAZOBACTAM 25 GRAM(S): 4; .5 INJECTION, POWDER, LYOPHILIZED, FOR SOLUTION INTRAVENOUS at 14:07

## 2019-01-06 RX ADMIN — Medication 100 MILLIGRAM(S): at 10:17

## 2019-01-06 RX ADMIN — SODIUM CHLORIDE 150 MILLILITER(S): 9 INJECTION, SOLUTION INTRAVENOUS at 10:14

## 2019-01-06 RX ADMIN — PIPERACILLIN AND TAZOBACTAM 25 GRAM(S): 4; .5 INJECTION, POWDER, LYOPHILIZED, FOR SOLUTION INTRAVENOUS at 22:22

## 2019-01-06 RX ADMIN — DULOXETINE HYDROCHLORIDE 60 MILLIGRAM(S): 30 CAPSULE, DELAYED RELEASE ORAL at 12:13

## 2019-01-06 NOTE — PROGRESS NOTE ADULT - PROBLEM SELECTOR PLAN 4
- NPO for now per GI given ERCP Monday or sooner PRN if clinically destabilizes   - q6h Finger sticks   - Lispro ISS   - A1C 8.0 on 12/19

## 2019-01-06 NOTE — PROGRESS NOTE ADULT - PROBLEM SELECTOR PLAN 2
- Pt febrile, leukocytosis, tachycardic and elevated lactate meeting sepsis requirements   - cholangitis the most likely source   - c/w Zosyn   - f/u blood cultures: currently with E.coli, f/u sensitivities   - Monitor VS

## 2019-01-06 NOTE — PROGRESS NOTE ADULT - ATTENDING COMMENTS
Continue current abx for cholangitis with bacteremia. Appreciate Onc recs; will pursue staging workup in anticipate of eventual treatment after infection resolves.

## 2019-01-06 NOTE — DISCHARGE NOTE ADULT - PATIENT PORTAL LINK FT
You can access the [facility] Patient Portal, offered by [facility], by registering with the following website: [email or web address]

## 2019-01-06 NOTE — DISCHARGE NOTE ADULT - OTHER SIGNIFICANT FINDINGS
< from: CT Abdomen and Pelvis w/ IV Cont (01.05.19 @ 06:42) >    Intra and extrahepatic biliary ductal dilatation is again noted with   pneumobilia likely related to recent ERCP. Hyperattenuation and mild wall   thickening involving the CBD raises the possibility of cholangitis;   correlate with laboratory values. CBD stent located in the distal CBD and   duodenum.    Heterogeneity of the right hepatic lobe with questionable 1.3 cm   hypoattenuating lesion in segment 7.    Heterogeneous patchy hypoenhancement involving the right kidney;   correlate clinically for pyelonephritis.    < end of copied text >

## 2019-01-06 NOTE — DISCHARGE NOTE ADULT - HOSPITAL COURSE
53 y.o. 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 Patient had acute onset of RUQ pain yesterday, with no inciting factors or relief with Aleve. The patient's family also endorsed 1-2 episodes of NBNB emesis yesterday and has had decreased PO intake over the last 36hours. As of diagnosis of cancer, the patient has not had follow up with oncology or surg-oncology due insurance issues. The patient has an appointment with [facility] on Wednesday for surgical evaluation. 53 y.o. 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 Patient had acute onset of RUQ pain yesterday, with no inciting factors or relief with Aleve. The patient's family also endorsed 1-2 episodes of NBNB emesis yesterday and has had decreased PO intake over the last 36hours. As of diagnosis of cancer, the patient has not had follow up with oncology or surg-oncology due insurance issues. The patient has an appointment with [facility] on Wednesday for surgical evaluation.      In the ED, patient febrile to 101.5, HR 98, BPs decreased to 98/41, found with leukocytosis to 15.2, lactate 4.4. She was found with RUQ pain, fever, and jaundice (Bilirubin 3.9) consistent with acute cholangitis. CT A/P revealed Hyperattenuation and mild wall thickening involving the CBD raises the possibility of cholangitis with pneumobilia related to recent ERCP. In the ED, patient received IVF, vanc/zosyn, morphine, and zofran. Patient admitted with severe sepsis 2/2 acute cholangitis.    MICU was consulted for hypotension, but this improved s/p fluids and IV antibiotics and patient did not warrant MICU level care. GI was consulted and recommended ERCP to be performed 1/7 or earlier PRN. The patient was maintained on zosyn for IV antibiotics. Patient remained stable on zosyn, white count trended down, lactate improved, and fevers were resolving. BCs ultimately grew E.Coli.     Oncology was consulted given patient's poor outpatient oncology follow up due to insurance issues, they recommended MRI abdomen/pelvis with contrast for continued staging of cancer workup. 53 y.o. 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 Patient had acute onset of RUQ pain yesterday, with no inciting factors or relief with Aleve. The patient's family also endorsed 1-2 episodes of NBNB emesis yesterday and has had decreased PO intake over the last 36hours. As of diagnosis of cancer, the patient has not had follow up with oncology or surg-oncology due insurance issues. The patient has an appointment with [facility] on Wednesday for surgical evaluation.      In the ED, patient febrile to 101.5, HR 98, BPs decreased to 98/41, found with leukocytosis to 15.2, lactate 4.4. She was found with RUQ pain, fever, and jaundice (Bilirubin 3.9) consistent with acute cholangitis. CT A/P revealed Hyperattenuation and mild wall thickening involving the CBD raises the possibility of cholangitis with pneumobilia related to recent ERCP. In the ED, patient received IVF, vanc/zosyn, morphine, and zofran. Patient admitted with severe sepsis 2/2 acute cholangitis.    MICU was consulted for hypotension, but this improved s/p fluids and IV antibiotics and patient did not warrant MICU level care. GI was consulted and recommended ERCP to be performed 1/7 or earlier PRN. The patient was maintained on zosyn for IV antibiotics. Patient remained stable on zosyn, white count trended down, lactate improved, and fevers were resolving. BCs ultimately grew E.Coli.     Oncology was consulted given patient's poor outpatient oncology follow up due to insurance issues, they recommended MRI abdomen/pelvis with contrast for continued staging of cancer workup.   MRI performed on 1/8: IMPRESSION: *  No suspicious liver lesions. No abnormality is identified to   correspond with questionable lesion identified on prior CT. *  Intra and extrahepatic biliary ductal dilation, with mural enhancement which may be seen in the setting of cholangitis. *  Cholelithiasis with gallbladder wall thickening.    Pt preopped and transferred to surgical oncology service and went to the OR on 1/11 with Dr. Bourne for a pancreaticoduodenectomy, intraop findings: pylorus sparing Whipple.  Pt tolerated procedure well, without complication.  Post op pt transferred from PACU to surgical floor.  Pain control was transitioned from PCEA to PCA.  Swanson catheter removed and pt passed TOV.  PCA was transitioned to PO pain meds.  Pt with post op fever, CXR, UA blood cultures negative.  Pt remained distended post op AXR performed on 1/14: IMPRESSION: Nonobstructive bowel gas pattern likely secondary to ileus given recent surgical history.  As GI function returned diet was slowly restarted and advanced as tolerated.  PT consulted and rec rehab on discharge.  Pt currently ambulating with assistance, voiding, tolerating a regular diet, with pain well controlled on PO pain meds.  Per team and attending, pt is hemodynamically stable for discharge to rehab with lovenox and JOURDAN drain to follow up as an outpatient. 53 y.o. 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 Patient had acute onset of RUQ pain yesterday, with no inciting factors or relief with Aleve. The patient's family also endorsed 1-2 episodes of NBNB emesis yesterday and has had decreased PO intake over the last 36hours. As of diagnosis of cancer, the patient has not had follow up with oncology or surg-oncology due insurance issues. The patient has an appointment with [facility] on Wednesday for surgical evaluation.      In the ED, patient febrile to 101.5, HR 98, BPs decreased to 98/41, found with leukocytosis to 15.2, lactate 4.4. She was found with RUQ pain, fever, and jaundice (Bilirubin 3.9) consistent with acute cholangitis. CT A/P revealed Hyperattenuation and mild wall thickening involving the CBD raises the possibility of cholangitis with pneumobilia related to recent ERCP. In the ED, patient received IVF, vanc/zosyn, morphine, and zofran. Patient admitted with severe sepsis 2/2 acute cholangitis.    MICU was consulted for hypotension, but this improved s/p fluids and IV antibiotics and patient did not warrant MICU level care. GI was consulted and recommended ERCP. The patient was maintained on zosyn for IV antibiotics. Patient remained stable on zosyn, white count trended down, lactate improved, and fevers were resolving. BCs ultimately grew E.Coli.       1/7: ERCP performed and One stent was removed from the biliary tree. One plastic stent was placed into the common bile duct. The major papilla appeared to have a mass.    Oncology was consulted given patient's poor outpatient oncology follow up due to insurance issues, they recommended CT scan and  MRI abdomen/pelvis with contrast for continued staging of cancer workup.     1/8 CT scan performed and showed Small bilateral pleural effusions with bibasilar atelectasis. No lung nodules. Mildly enlarged right cardiophrenic angle lymph node is unchanged since December 18, 2018 is indeterminate.  MRI performed on 1/8: IMPRESSION: *  No suspicious liver lesions. No abnormality is identified to correspond with questionable lesion identified on prior CT. *  Intra and extrahepatic biliary ductal dilation, with mural enhancement which may be seen in the setting of cholangitis. *  Cholelithiasis with gallbladder wall thickening.    Pt  transferred to surgical oncology service and went to the OR on 1/11 with Dr. Bourne for a pancreaticoduodenectomy, intraop findings: pylorus sparing Whipple.  Pt tolerated procedure well, without complication.  Post op pt transferred from PACU to surgical floor.  Pain control was transitioned from PCEA to PCA.  Swanson catheter removed and pt passed TOV.  PCA was transitioned to PO pain meds.      1/18: Pt was a surgical rapid response for which pt transferred to SICU.  Patient was febrile to 102. Cultures sent. Antibiotics changed from zosyn to meropenem. Patient was tachypneic and hypotensive to 70s systolic. CBC revealed drop in H/H to 6/19 with elevated lactate of 9. Decision was made to intubate patient. Gurinder with vasu blood. Massive Transfusion protocol initiated. Right IJ cordis placed as well as arterial line. Patient received 4PRBC 5FFP and 1platelet, and bolus 1L of fluid. Surgical team and attending notified when Peak pressures on vent were 50s and increased abdominal distention. Decision was then made to take patient emergently to OR. Patient had evacuation of hematoma, cessation of gastroduodenal artery bleeding with two hemostatic stiches, and placement of abthera VAC. Pt transferred to SICU post op. Patient hypotensive post-op with low UOP. 2 L bolus given and valentina trac started. CBC remained stable. SVV began to increase to 18, 250 of albumin given. Patient hypotensive to 80s systolic, [place] started and increased, patient was then transitioned to levo. Lactate downtrended to 7.6 and H/H stable.    1/19 pt cont to require pressors, resuscitated with IVF, and had drop H/H for which transfused    1/21 Pt febrile to 100.6 overnight, cultures sent, UA+ nitrite, follow up urine cultures. Pt RTOR for Exploratory laparotomy, drainage of abdominal abscess, repair of abdominal wall hernia with Strattice mesh, bilateral abdominal wall advancement flaps. Post-op patient remained intubated and  off vasopressors. FENA calculated to be pre-renal. 500CC albumin bolus given, urine output improved. Overnight midline incision dressing saturated with serosanguinous drainage and re-dressed.    1/22 attempted spontaneous breathing trials but pt hypoxic. Pt lactate uptrending, resuscitated w/IVF, and pt hypotensive requiring pressors.  UCx growing candida, diflucan changed to micafungin to r/o resistant organisms in the setting of worsening sepsis.    1/23 Patient noted to have blood oozing from midline incision while hypotensive and on pressors.  Peak pressures noted to be elevated on vent and abdomen increasingly distended.  All staples removed and a large amount of clot evacuated from midline incision. 1U PRBC given. No active bleeding appreciated.  Fascia remains closed.  Wound packed with wet to dry dressing.  Introducer removed.  A new triple lumen was placed in the left IJ.  1 port will be saved for TPN, consult called.  Vitamin K given for elevated INR.      1/24 nutrition consulted and pt started on TPN. Plastic consulted bc midline opened for hematoma evacuation yesterday, external tissue expander device (Dermaclose) applied to the wound. The device will automatically tighten (on its own) without external intervention. This device will help hasten definitive closure of the abdominal wound.  Patient hypotensive requiring reinitiation of low dose vasopressors    1/25 Sedatives and pressors were decreased and pt has been doing well. Pt was volume overloaded and was given Albumin, Lasix, and a Bumex drip. O/N, pt febrile 102, given tylenol. mildly hypotensive SBP 80, started on small dose levo.     1/27 Patient briefly attempted at PS 5/5 21% FiO2 with resultant respiratory distress, changed to 10/5 30% FiO2 with improvement. Decision to continue diuresis with Diamox 250mcg q8h and possible extubation tomorrow.    1/28 Patient febrile to 101 today. Diuresing with lasix/bumex, giving albumin. Continued spontaneous breathing trials    1/29 Patient tolerating CPAP trail with RSBI < 60. Leak test performed, confirmed >50% leak with cuff deflated. Patient extubated without issue. NGT was removed. Patient started on clears, continue TPN while caloric intake is still low.    1/30 CT scan performed and showed Bilateral moderate pleural effusions with associated atelectasis.  Partially imaged right lower lung patchy opacity may represent small infiltrate in the appropriate clinical setting. Moderate abdominopelvic ascites most prominent subjacent to the anterior  abdominal wall.  Left upper and lower quadrant collection measuring greater than fluid attenuation, which may reflect the presence of hemorrhagic products. Mild enhancement is seen in association with the anterior abdominal ascites, dependent pelvic ascites, and left mid abdominal collection, consistent with the presence of peritonitis. Findings may be postoperative or related to the presence of hemorrhagic products. Infection is not excluded.  Pt started on po labetalol for persistent HTN, however pt with hypotensive episode in PM shortly after administration to 90s/30s. Patient given 250ml albumin. Pt with intermittent episodes delirium for which received seroquel prn 53 y.o. 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 Patient had acute onset of RUQ pain yesterday, with no inciting factors or relief with Aleve. The patient's family also endorsed 1-2 episodes of NBNB emesis yesterday and has had decreased PO intake over the last 36hours. As of diagnosis of cancer, the patient has not had follow up with oncology or surg-oncology due insurance issues. The patient has an appointment with [facility] on Wednesday for surgical evaluation.    In the ED, patient febrile to 101.5, HR 98, BPs decreased to 98/41, found with leukocytosis to 15.2, lactate 4.4. She was found with RUQ pain, fever, and jaundice (Bilirubin 3.9) consistent with acute cholangitis. CT A/P revealed Hyperattenuation and mild wall thickening involving the CBD raises the possibility of cholangitis with pneumobilia related to recent ERCP. In the ED, patient received IVF, vanc/zosyn, morphine, and zofran. Patient admitted with severe sepsis 2/2 acute cholangitis.  MICU was consulted for hypotension, but this improved s/p fluids and IV antibiotics and patient did not warrant MICU level care. GI was consulted and recommended ERCP. The patient was maintained on zosyn for IV antibiotics. Patient remained stable on zosyn, white count trended down, lactate improved, and fevers were resolving. BCs ultimately grew E.Coli.   1/7: ERCP performed and One stent was removed from the biliary tree. One plastic stent was placed into the common bile duct. The major papilla appeared to have a mass.  Oncology was consulted given patient's poor outpatient oncology follow up due to insurance issues, they recommended CT scan and  MRI abdomen/pelvis with contrast for continued staging of cancer workup.     1/8 CT scan performed and showed Small bilateral pleural effusions with bibasilar atelectasis. No lung nodules. Mildly enlarged right cardiophrenic angle lymph node is unchanged since December 18, 2018 is indeterminate.  MRI performed on 1/8: IMPRESSION: *  No suspicious liver lesions. No abnormality is identified to correspond with questionable lesion identified on prior CT. *  Intra and extrahepatic biliary ductal dilation, with mural enhancement which may be seen in the setting of cholangitis. *  Cholelithiasis with gallbladder wall thickening.  Pt  transferred to surgical oncology service and went to the OR on 1/11 with Dr. Bourne for a pancreaticoduodenectomy, intraop findings: pylorus sparing Whipple.  Pt tolerated procedure well, without complication.  Post op pt transferred from PACU to surgical floor.  Pain control was transitioned from PCEA to PCA.  Swanson catheter removed and pt passed TOV.  PCA was transitioned to PO pain meds.    1/18: Pt was a surgical rapid response for which pt transferred to SICU.  Patient was febrile to 102. Cultures sent. Antibiotics changed from zosyn to meropenem. Patient was tachypneic and hypotensive to 70s systolic. CBC revealed drop in H/H to 6/19 with elevated lactate of 9. Decision was made to intubate patient. Gurinder with vasu blood. Massive Transfusion protocol initiated. Right IJ cordis placed as well as arterial line. Patient received 4PRBC 5FFP and 1platelet, and bolus 1L of fluid. Surgical team and attending notified when Peak pressures on vent were 50s and increased abdominal distention. Decision was then made to take patient emergently to OR. Patient had evacuation of hematoma, cessation of gastroduodenal artery bleeding with two hemostatic stiches, and placement of abthera VAC. Pt transferred to SICU post op. Patient hypotensive post-op with low UOP. 2 L bolus given and valentina trac started. CBC remained stable. SVV began to increase to 18, 250 of albumin given. Patient hypotensive to 80s systolic, [place] started and increased, patient was then transitioned to levo. Lactate downtrended to 7.6 and H/H stable.  1/19 pt cont to require pressors, resuscitated with IVF, and had drop H/H for which transfused  1/21 Pt febrile to 100.6 overnight, cultures sent, UA+ nitrite, follow up urine cultures. Pt RTOR for Exploratory laparotomy, drainage of abdominal abscess, repair of abdominal wall hernia with Strattice mesh, bilateral abdominal wall advancement flaps. Post-op patient remained intubated and  off vasopressors. FENA calculated to be pre-renal. 500CC albumin bolus given, urine output improved. Overnight midline incision dressing saturated with serosanguinous drainage and re-dressed.  1/22 attempted spontaneous breathing trials but pt hypoxic. Pt lactate uptrending, resuscitated w/IVF, and pt hypotensive requiring pressors.  UCx growing candida, diflucan changed to micafungin to r/o resistant organisms in the setting of worsening sepsis.  1/23 Patient noted to have blood oozing from midline incision while hypotensive and on pressors.  Peak pressures noted to be elevated on vent and abdomen increasingly distended.  All staples removed and a large amount of clot evacuated from midline incision. 1U PRBC given. No active bleeding appreciated.  Fascia remains closed.  Wound packed with wet to dry dressing.  Introducer removed.  A new triple lumen was placed in the left IJ.  1 port will be saved for TPN, consult called.  Vitamin K given for elevated INR.    1/24 nutrition consulted and pt started on TPN. Plastic consulted bc midline opened for hematoma evacuation yesterday, external tissue expander device (Dermaclose) applied to the wound. The device will automatically tighten (on its own) without external intervention. This device will help hasten definitive closure of the abdominal wound.  Patient hypotensive requiring reinitiation of low dose vasopressors  1/25 Sedatives and pressors were decreased and pt has been doing well. Pt was volume overloaded and was given Albumin, Lasix, and a Bumex drip. O/N, pt febrile 102, given tylenol. mildly hypotensive SBP 80, started on small dose levo.   1/27 Patient briefly attempted at PS 5/5 21% FiO2 with resultant respiratory distress, changed to 10/5 30% FiO2 with improvement. Decision to continue diuresis with Diamox 250mcg q8h and possible extubation tomorrow.  1/28 Patient febrile to 101 today. Diuresing with lasix/bumex, giving albumin. Continued spontaneous breathing trials  1/29 Patient tolerating CPAP trail with RSBI < 60. Leak test performed, confirmed >50% leak with cuff deflated. Patient extubated without issue. NGT was removed. Patient started on clears, continue TPN while caloric intake is still low.  1/30 CT scan performed and showed Bilateral moderate pleural effusions with associated atelectasis.  Partially imaged right lower lung patchy opacity may represent small infiltrate in the appropriate clinical setting. Moderate abdominopelvic ascites most prominent subjacent to the anterior  abdominal wall.  Left upper and lower quadrant collection measuring greater than fluid attenuation, which may reflect the presence of hemorrhagic products. Mild enhancement is seen in association with the anterior abdominal ascites, dependent pelvic ascites, and left mid abdominal collection, consistent with the presence of peritonitis. Findings may be postoperative or related to the presence of hemorrhagic products. Infection is not excluded.  Pt started on po labetalol for persistent HTN, however pt with hypotensive episode in PM shortly after administration to 90s/30s. Patient given 250ml albumin. Pt with intermittent episodes delirium for which received seroquel prn   2/1 Persistently febrile, Tmax 101.3, SBP dropped to 80-100s and became tachy 90s-110s,tachypnea to 28-40s. Albumin 250 and 250cc LR given but, BP did not respond. Levophed restarted. GURINDER X 2 started to drain copious amounts of bilious outpt. GURINDER #1 700cc and GURINDER #1 500cc. Abd binder taken down abd soft, mild tenderness, midline W2D dressing removed, wound clean, no purulent drainage noted.  SBP now [id number]. Swanson placed for strict Is and Os. Right radial arterial line placed for BP monitoring and right femoral triple lumen central placed for access. Given stat dose of vancomycin and meropenum.  2/2 CT scan performed and showed New diffuse peritoneal enhancement, compatible with peritonitis, more extensive compared to 1/30/2019. Multiple abdominal and pelvic fluid collections consistent with abscesses. New small bowel wall may also edema and colonic wall thickening, likely reactive. Left upper quadrant collection measuring greater than fluid attenuation is grossly unchanged from 1/30/2019. Moderate left and small right pleural effusions with adjacent atelectasis, slightly decreased from 1/30/2019. Diffuse thickening of the colon consistent with colitis. 53 y.o. 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 Patient had acute onset of RUQ pain yesterday, with no inciting factors or relief with Aleve. The patient's family also endorsed 1-2 episodes of NBNB emesis yesterday and has had decreased PO intake over the last 36hours. As of diagnosis of cancer, the patient has not had follow up with oncology or surg-oncology due insurance issues. The patient has an appointment with [facility] on Wednesday for surgical evaluation.    In the ED, patient febrile to 101.5, HR 98, BPs decreased to 98/41, found with leukocytosis to 15.2, lactate 4.4. She was found with RUQ pain, fever, and jaundice (Bilirubin 3.9) consistent with acute cholangitis. CT A/P revealed Hyperattenuation and mild wall thickening involving the CBD raises the possibility of cholangitis with pneumobilia related to recent ERCP. In the ED, patient received IVF, vanc/zosyn, morphine, and zofran. Patient admitted with severe sepsis 2/2 acute cholangitis.  MICU was consulted for hypotension, but this improved s/p fluids and IV antibiotics and patient did not warrant MICU level care. GI was consulted and recommended ERCP. The patient was maintained on zosyn for IV antibiotics. Patient remained stable on zosyn, white count trended down, lactate improved, and fevers were resolving. BCs ultimately grew E.Coli.   1/7: ERCP performed. Stent was removed from the biliary tree. One plastic stent was placed into the common bile duct. The major papilla appeared to have a mass.  Oncology was consulted given patient's poor outpatient oncology follow up due to insurance issues, they recommended CT scan and  MRI abdomen/pelvis with contrast for continued staging of cancer workup.     1/8 CT scan performed and showed Small bilateral pleural effusions with bibasilar atelectasis. No lung nodules. Mildly enlarged right cardiophrenic angle lymph node is unchanged since December 18, 2018 is indeterminate.  MRI performed on 1/8: IMPRESSION: *  No suspicious liver lesions. No abnormality is identified to correspond with questionable lesion identified on prior CT. *  Intra and extrahepatic biliary ductal dilation, with mural enhancement which may be seen in the setting of cholangitis. *  Cholelithiasis with gallbladder wall thickening.  Pt  transferred to surgical oncology service and went to the OR on 1/11 with Dr. Bourne for a pancreaticoduodenectomy, intraop findings: pylorus sparing Whipple.  Pt tolerated procedure well, without complication.  Post op pt transferred from PACU to surgical floor.  Pain control was transitioned from PCEA to PCA.  Swanson catheter removed and pt passed TOV.  PCA was transitioned to PO pain meds.    1/18: Pt was a surgical rapid response for which pt transferred to SICU.  Patient was febrile to 102. Cultures sent. Antibiotics changed from zosyn to meropenem. Patient was tachypneic and hypotensive to 70s systolic. CBC revealed drop in H/H to 6/19 with elevated lactate of 9. Decision was made to intubate patient. Massive Transfusion protocol initiated. Patient received 4PRBC 5FFP and 1platelet, and bolus 1L of fluid. Surgical team and attending notified when Peak pressures on vent were 50s and increased abdominal distention. Decision was then made to take patient emergently to OR. Patient had evacuation of hematoma, cessation of gastroduodenal artery bleeding with two hemostatic stiches, and placement of abthera VAC. Pt transferred to SICU post op. Patient hypotensive post-op with low UOP. 2 L bolus given.  CBC remained stable.  Patient hypotensive to 80s systolic, [place] started and increased, patient was then transitioned to levo. Lactate downtrended to 7.6 and H/H stable.  1/19 pt cont to require pressors, resuscitated with IVF, and had drop H/H for which transfused  1/21 Pt febrile to 100.6 overnight, cultures sent, UA+ nitrite, follow up urine cultures. Pt RTOR for Exploratory laparotomy, drainage of abdominal abscess, repair of abdominal wall hernia with Strattice mesh, b/ abdominal wall advancement flaps. Post-op patient remained intubated and  off vasopressors. FENA calculated to be pre-renal. 500CC albumin bolus given, urine output improved.   1/22 attempted spontaneous breathing trials but pt hypoxic. Pt lactate uptrending, resuscitated w/IVF, and pt hypotensive requiring pressors.  UCx growing candida, diflucan changed to micafungin to r/o resistant organisms in the setting of worsening sepsis.  1/23 Patient noted to have blood oozing from midline incision while hypotensive and on pressors.  Peak pressures noted to be elevated on vent and abdomen increasingly distended.  All staples removed and a large amount of clot evacuated from midline incision. 1U PRBC given. No active bleeding appreciated.  Fascia remains closed.  Wound packed with wet to dry dressing.  Vitamin K given for elevated INR.    1/24 nutrition consulted and pt started on TPN. Plastic consulted bc midline opened for hematoma evacuation yesterday, external tissue expander device (Dermaclose) applied to the wound.  Patient hypotensive requiring reinitiation of low dose vasopressors  1/25 Sedatives and pressors were decreased. Pt was volume overloaded and was given Albumin, Lasix, and a Bumex drip. O/N, pt febrile 102, given tylenol. mildly hypotensive SBP 80, started on small dose levo.   1/27 Cont SBT. Decision to continue diuresis with Diamox   1/28 Patient febrile to 101 today. Diuresing with lasix/bumex, giving albumin. Continued SBTs  1/29 Patient tolerating CPAP. Patient extubated without issue. NGT was removed. Patient started on clears, continue TPN while caloric intake is still low.  1/30 CT scan performed and showed b/l moderate pleural effusions with associated atelectasis.  Partially imaged right lower lung patchy opacity may represent small infiltrate in the appropriate clinical setting. Moderate abdominopelvic ascites most prominent subjacent to the anterior  abdominal wall.  Left upper and lower quadrant collection measuring greater than fluid attenuation, which may reflect the presence of hemorrhagic products. Mild enhancement is seen in association with the anterior abdominal ascites, dependent pelvic ascites, and left mid abdominal collection, consistent with the presence of peritonitis. Findings may be postoperative or related to the presence of hemorrhagic products.Pt started on po labetalol for persistent HTN, however pt with hypotensive episode in PM shortly after administration. Patient given albumin.   2/1 Persistently febrile, Tmax 101.3, hypotensive, tachycardic, and tachypneac. Albumin and IVF given, BP did not respond. Levophed restarted. JOURDAN X 2 started to drain copious amounts of bilious outpt. JOURDAN #1 700cc and JOURDAN #1 500cc. Given stat dose of vancomycin and meropenum.  2/2 CT scan performed and showed New diffuse peritoneal enhancement, compatible with peritonitis, more extensive compared to 1/30/2019. Multiple abdominal and pelvic fluid collections consistent with abscesses. New small bowel wall may also edema and colonic wall thickening, likely reactive. Left upper quadrant collection measuring greater than fluid attenuation is grossly unchanged from 1/30/2019. Moderate left and small right pleural effusions with adjacent atelectasis, slightly decreased from 1/30/2019. Diffuse thickening of the colon consistent with colitis. 53 y.o. 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 Patient had acute onset of RUQ pain yesterday, with no inciting factors or relief with Aleve. The patient's family also endorsed 1-2 episodes of NBNB emesis yesterday and has had decreased PO intake over the last 36hours. As of diagnosis of cancer, the patient has not had follow up with oncology or surg-oncology due insurance issues. The patient has an appointment with [facility] on Wednesday for surgical evaluation.    In the ED, patient febrile to 101.5, HR 98, BPs decreased to 98/41, found with leukocytosis to 15.2, lactate 4.4. She was found with RUQ pain, fever, and jaundice (Bilirubin 3.9) consistent with acute cholangitis. CT A/P revealed Hyperattenuation and mild wall thickening involving the CBD raises the possibility of cholangitis with pneumobilia related to recent ERCP. In the ED, patient received IVF, vanc/zosyn, morphine, and zofran. Patient admitted with severe sepsis 2/2 acute cholangitis.MICU was consulted for hypotension, but this improved s/p fluids and IV antibiotics and patient did not warrant MICU level care. GI was consulted and recommended ERCP. The patient was maintained on zosyn for IV antibiotics. Patient remained stable on zosyn, white count trended down, lactate improved, and fevers were resolving. BCs ultimately grew E.Coli. 1/7: ERCP performed. Stent was removed from the biliary tree. One plastic stent was placed into the common bile duct. The major papilla appeared to have a mass.Oncology was consulted given patient's poor outpatient oncology follow up due to insurance issues, they recommended CT scan and  MRI abdomen/pelvis with contrast for continued staging of cancer workup.     1/8 CT scan performed and showed Small bilateral pleural effusions with bibasilar atelectasis. No lung nodules. Mildly enlarged right cardiophrenic angle lymph node is unchanged since December 18, 2018 is indeterminate.MRI performed on 1/8: IMPRESSION: *  No suspicious liver lesions. No abnormality is identified to correspond with questionable lesion identified on prior CT. *  Intra and extrahepatic biliary ductal dilation, with mural enhancement which may be seen in the setting of cholangitis. *  Cholelithiasis with gallbladder wall thickening.Pt  transferred to surgical oncology service and went to the OR on 1/11 with Dr. Bourne for a pancreaticoduodenectomy, intraop findings: pylorus sparing Whipple.  Pt tolerated procedure well, without complication.  Post op pt transferred from PACU to surgical floor.  Pain control was transitioned from PCEA to PCA.  Sinha catheter removed and pt passed TOV.  PCA was transitioned to PO pain meds.    1/18: Pt was a surgical rapid response for which pt transferred to SICU.  Patient was febrile to 102. Cultures sent. Antibiotics changed from zosyn to meropenem. Patient was tachypneic and hypotensive to 70s systolic. CBC revealed drop in H/H to 6/19 with elevated lactate of 9. Decision was made to intubate patient. Massive Transfusion protocol initiated. Patient received 4PRBC 5FFP and 1platelet, and bolus 1L of fluid. Surgical team and attending notified when Peak pressures on vent were 50s and increased abdominal distention. Decision was then made to take patient emergently to OR. Patient had evacuation of hematoma, cessation of gastroduodenal artery bleeding with two hemostatic stiches, and placement of abthera VAC. Pt transferred to SICU post op. Patient hypotensive post-op with low UOP. 2 L bolus given.  CBC remained stable.  Patient hypotensive to 80s systolic, [place] started and increased, patient was then transitioned to levo. Lactate downtrended to 7.6 and H/H stable.1/19 pt cont to require pressors, resuscitated with IVF, and had drop H/H for which transfused  1/21 Pt febrile to 100.6 overnight, cultures sent, UA+ nitrite, follow up urine cultures. Pt RTOR for Exploratory laparotomy, drainage of abdominal abscess, repair of abdominal wall hernia with Strattice mesh, b/ abdominal wall advancement flaps. Post-op patient remained intubated and  off vasopressors. FENA calculated to be pre-renal. 500CC albumin bolus given, urine output improved. 1/22 attempted spontaneous breathing trials but pt hypoxic. Pt lactate uptrending, resuscitated w/IVF, and pt hypotensive requiring pressors.  UCx growing candida, diflucan changed to micafungin to r/o resistant organisms in the setting of worsening sepsis.1/23 Patient noted to have blood oozing from midline incision while hypotensive and on pressors.  Peak pressures noted to be elevated on vent and abdomen increasingly distended.  All staples removed and a large amount of clot evacuated from midline incision. 1U PRBC given. No active bleeding appreciated.  Fascia remains closed.  Wound packed with wet to dry dressing.  Vitamin K given for elevated INR.  1/24 nutrition consulted and pt started on TPN. Plastic consulted bc midline opened for hematoma evacuation yesterday, external tissue expander device (Dermaclose) applied to the wound.  Patient hypotensive requiring reinitiation of low dose vasopressors1/25 Sedatives and pressors were decreased. Pt was volume overloaded and was given Albumin, Lasix, and a Bumex drip. O/N, pt febrile 102, given tylenol. mildly hypotensive SBP 80, started on small dose levo. 1/27 Cont SBT. Decision to continue diuresis with Diamox 1/28 Patient febrile to 101 today. Diuresing with lasix/bumex, giving albumin. Continued SBTs1/29 Patient tolerating CPAP. Patient extubated without issue. NGT was removed. Patient started on clears, continue TPN while caloric intake is still low. 1/30 CT scan performed and showed b/l moderate pleural effusions with associated atelectasis.  Partially imaged right lower lung patchy opacity may represent small infiltrate in the appropriate clinical setting. Moderate abdominopelvic ascites most prominent subjacent to the anterior  abdominal wall.  Left upper and lower quadrant collection measuring greater than fluid attenuation, which may reflect the presence of hemorrhagic products. Mild enhancement is seen in association with the anterior abdominal ascites, dependent pelvic ascites, and left mid abdominal collection, consistent with the presence of peritonitis. Findings may be postoperative or related to the presence of hemorrhagic products.Pt started on po labetalol for persistent HTN, however pt with hypotensive episode in PM shortly after administration. Patient given albumin. 2/1 Persistently febrile, Tmax 101.3, hypotensive, tachycardic, and tachypneac. Albumin and IVF given, BP did not respond. Levophed restarted. JOURDAN X 2 started to drain copious amounts of bilious outpt. JOURDAN #1 700cc and JOURDAN #1 500cc. Given stat dose of vancomycin and meropenum.  2/2 CT scan performed and showed New diffuse peritoneal enhancement, compatible with peritonitis, more extensive compared to 1/30/2019. Multiple abdominal and pelvic fluid collections consistent with abscesses. New small bowel wall may also edema and colonic wall thickening, likely reactive. Left upper quadrant collection measuring greater than fluid attenuation is grossly unchanged from 1/30/2019.2/3: received 2 u prbc overnight, placed on BIPAP overnight,  vanc started overnight, start diflucan, vaso and levo started overnight, ABG and CBC, PM labs, A line placed overnight. Plan for IR drainage tomorrow. 2/4: on pressors overnight, removed in AM. IR today for abscess drainage. advance tiger tube, NGT removed. repeat labs, remove a line after procedure. Increased serobilious wound drainage, BID dressing changes.2/5: diflucan to micafungin. decreased seroquil to 25. IV lock. start trickle feeds glucerna. 2/6: no further diuresis at this time, high bilious midline wound output, vac to be placed today by plastics. Sinha out. feeds to goal today.2/7: Central line and sinha removed, discontinued Seroquel, feeds changed to continuous2/8: Octreotide started for high output from midline wound. Lasix with goal net negative 2L2/9: Resent blood cx and UA, bedside vac change by plastics today.2/11: Patient ordered for CLD w/ TF via NJT, upper and lower portion of abdominal wound closed by plastics, send left JOURDAN for amylase 2/12: CT A&P shows two collections... drainage by IR today. Drain was upsized on the right and left  collection 50cc of pus was drained.2/13: Stable for dispo to floor. 2/15: ID was consulted and recommended Meropenem and to continue Vancomycin for better source control. Patient was also seen by PM&R and they recommended FARIBA if patient is able to participate in care.2/18: Wound was patially closed by plastics with interrupted sutures and an ostomy appliance was placed, VAC removed.2/19 Antibiotics was changed to Meropenem and Zyvox, culture grew VRE.2/20: Patient had a tube check and the LUQ drain was removed. CT showed inprovement in abdominal collections.  Calorie count was performed. Patient is tolerating a regular diet with GLucerna shakes. Octreotide discontinued on 2/26.2/28: Tube check done and RLQ drain was removed.Patient will be discharged home. Ostomy care was taught to daughter by wound care team and she demonstrated understanding,  Instructions provided.Patient will have VNS for wound care and PT. She is stable for discharge to home as per Attending. She will follow up with Dr Bourne and Oncology. She will be discharged on 1 more week of antibiotics (Cipro and Flagyl per ID).

## 2019-01-06 NOTE — CONSULT NOTE ADULT - ATTENDING COMMENTS
53/f with newly diagnosed ampullary ca, s/p stent at OSH, a/w cholangitis and GN bacteremia. Agree with ERCP and abx. CT A/P reviewed. Liver lesion is concerning for mets. Rec MRI liver. CT chest to complete staging. Surg onc following.

## 2019-01-06 NOTE — DISCHARGE NOTE ADULT - CONDITIONS AT DISCHARGE
PT A&OX4. Pt denies chest pain, SOB and palpitati PT A&OX4. Pt denies chest pain, SOB and palpitations. Pt remained hemodynamically stable. pt denies pain and discomfort. Pt and family member re educated on JOURDAN drain flush and emptying. Also educated on pouch dressing change and emptying. Pt discharged at this time per MD order in stable condition with discharge instructions.

## 2019-01-06 NOTE — DISCHARGE NOTE ADULT - ADDITIONAL INSTRUCTIONS
Please follow up with GI.    Please follow up with oncology. Please follow up with GI.  Please follow up with oncology.  Call to schedule an appointment at the [facility] at  [address] [place] [address]  Please follow up with Dr Bourne in 1-2 weeks. Call to schedule an appointment.  Please call to make an appointment to follow up with your primary care physician regarding your recent hospitalization. Please follow up with your Gastroenterologist as an outpatient.  Please follow up with your Oncologist as an outpatient.  Please follow up with Dr Bourne in 1-2 weeks. Call to schedule an appointment.  Please call to make an appointment to follow up with your primary care physician regarding your recent hospitalization.  Please follow up with Interventional Radiology as an outpatient, please call to schedule appointment  Wound care: fistula care as taught in hospital  you are being discharged home with an IR drain in place. Please empty measure and record drain outputs, please bring copy of drain outputs to follow up appointment with IR. Please flush drain daily with 10cc sterile normal saline as taught in hospital  Call to schedule an appointment at the [facility] at [phone] [address] [place] [address]

## 2019-01-06 NOTE — DISCHARGE NOTE ADULT - PROVIDER TOKENS
PROVIDER:[TOKEN:[[id number]],FOLLOWUP:[1 week]] PROVIDER:[TOKEN:[[id number]],FOLLOWUP:[1 week]],PROVIDER:[TOKEN:[[id number]]]

## 2019-01-06 NOTE — PROGRESS NOTE ADULT - PROBLEM SELECTOR PLAN 1
- pt has RUQ pain, jaundice and fever, fulfilling charcot's traid   - Has a known tumor in the CBD and recent ERCP, put at increase risk fo cholangitis   - c/w Zosyn (started on 1/5)  - c/w IVF   - Appreciate GI recs: plan for ERCP on Monday or earlier PRN

## 2019-01-06 NOTE — DISCHARGE NOTE ADULT - CARE PROVIDER_API CALL
Dillon Bourne)  Surgery; Surgical Oncology  [address] [place] [address]  Phone: [phone]  Fax: [phone]  Follow Up Time: 1 week Dillon Bourne)  Surgery; Surgical Oncology  [address] [place] [address]  Phone: [phone]  Fax: [phone]  Follow Up Time: 1 week    Tj Ospina)  VascularIntervent Radiology  [address] [place] [address]  Phone: [phone]  Fax: [phone]  Follow Up Time:

## 2019-01-06 NOTE — DISCHARGE NOTE ADULT - CARE PROVIDERS DIRECT ADDRESSES
[email or web address]

## 2019-01-06 NOTE — DISCHARGE NOTE ADULT - INSTRUCTIONS
low sodium, heart healthy diet Pouching:  -Cleanse skin with water, pat dry.  -Apply stoma powder, dust off excess. Seal in with liquid barrier film.  -Cut 1 piece ostomy pouch (item # 8931) to template created. Then remove plastic backing.  -Apply megan barrier ring (item #060446) to back of pouching system.  -Re-apply pouch to abdominal wound.  -Empty pouch when 1/2 full.  -Change entire pouch 2-3 times per week. low sodium, carb consistent diet, Glucerna shakes 3x/day

## 2019-01-06 NOTE — DISCHARGE NOTE ADULT - PLAN OF CARE
to treat infection In the hospital, you were found with an infection in your biliary tract. This was likely related to the cancer in the duct connected to your gallbladder. You were treated with antibioitcs in the hospital and an ERCP procedure to relieve the obstruction in your duct was performed by the GI/stomach doctors.    You must follow up with the gastroenterology and oncology (cancer) doctors to follow up with your infection. 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 For this, we consulted our cancer doctors, who recommended you receive a MRI of the abdomen for further staging of your cancer.     You must follow up with the cancer doctors for continued treatment of your cancer. In the hospital, you were found with an infection in your biliary tract. This was likely related to the cancer in the duct connected to your gallbladder. You were treated with antibiotics in the hospital and an ERCP procedure to relieve the obstruction in your duct was performed by the GI/stomach doctors.    You must follow up with the gastroenterology and oncology (cancer) doctors to follow up with your infection. to treat cancer. Status post Whipple Procedure You have a history of cancer of the duct connected to your gallbladder. For this, we consulted our cancer doctors, who recommended you receive a MRI of the abdomen for further staging of your cancer.   You must follow up with the cancer doctors for continued treatment of your cancer. You must call to schedule an appointment at [facility].  Please follow up with Dr Bourne in 1-2 weeks. Call to schedule an appointment.  Please call Interventional Radiology to schedule a Tube check in 1 week. Call [phone]. You have a history of cancer of the duct connected to your gallbladder. For this, we consulted our cancer doctors, who recommended you receive a MRI of the abdomen for further staging of your cancer.   You must follow up with the cancer doctors for continued treatment of your cancer. You must call to schedule an appointment at [facility].  Please follow up with Dr Bourne in 1-2 weeks. Call to schedule an appointment.  Please call Interventional Radiology to schedule a Tube check in 1 week. Call [phone].  You are being discharged home on antibiotics, please continue through 3/6/19 per ID recs

## 2019-01-06 NOTE — CONSULT NOTE ADULT - ASSESSMENT
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 coli bacteremia.     # Possible cholangitis with E. coli bacteremia:   - continue with zosyn  - tentative plan for ERCP Monday 1/7 per GI    # Invasive ampullary adenocarcinoma:   - will need definitive surgical management first, then consideration for adjuvant chemotherapy  - acute infection must resolve 53F w/ newly diagnosed invasive ampullary adenocarcinoma (at least muscularis mucosae) with foci of lymphovascular invasion s/p recent ERCP with CBD stent placement, who presents with abdominal pain, elevated total bilirubin, leukocytosis and CT A/P concerning for cholangitis, now with E. coli bacteremia.     # Possible cholangitis with E. coli bacteremia:   - continue with zosyn  - tentative plan for ERCP Monday 1/7 per GI    # Invasive ampullary adenocarcinoma:   - will likely need definitive surgical management first, however needs further discussion with Surgical Oncology after resolution of infection, MRI results   - needs MRI abdomen/pelvis w/ contrast for characterization of liver lesion  - needs CT Chest to complete staging   - acute infection must resolve     Glenna Brock MD  Hematology/Oncology Fellow, PGY-4  pager: [phone]  After 5pm or on weekends, please page the on-call fellow. 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 coli bacteremia.     # Possible cholangitis with E. coli bacteremia:   - continue with zosyn  - tentative plan for ERCP Monday 1/7 per GI    # Invasive ampullary adenocarcinoma:   - will likely need definitive surgical management first, however needs further discussion with Surgical Oncology after resolution of infection, MRI results   - needs MRI abdomen/pelvis w/ contrast for characterization of liver lesion  - needs CT Chest to complete staging       Glenna Brock MD  Hematology/Oncology Fellow, PGY-4  pager: [phone]  After 5pm or on weekends, please page the on-call fellow.

## 2019-01-06 NOTE — DISCHARGE NOTE ADULT - CARE PLAN
Principal Discharge DX:	Cholangitis  Goal:	to treat infection  Assessment and plan of treatment:	In the hospital, you were found with an infection in your biliary tract. This was likely related to the cancer in the duct connected to your gallbladder. You were treated with antibioitcs in the hospital and an ERCP procedure to relieve the obstruction in your duct was performed by the GI/stomach doctors.    You must follow up with the gastroenterology and oncology (cancer) doctors to follow up with your infection.  Secondary Diagnosis:	Adenocarcinoma of gallbladder  Goal:	to treat cancer  Assessment and plan of treatment:	You have a history of cancer of the duct connected to your gallbladder. For this, we consulted our cancer doctors, who recommended you receive a MRI of the abdomen for further staging of your cancer.     You must follow up with the cancer doctors for continued treatment of your cancer. Principal Discharge DX:	Cholangitis  Goal:	to treat infection  Assessment and plan of treatment:	In the hospital, you were found with an infection in your biliary tract. This was likely related to the cancer in the duct connected to your gallbladder. You were treated with antibiotics in the hospital and an ERCP procedure to relieve the obstruction in your duct was performed by the GI/stomach doctors.    You must follow up with the gastroenterology and oncology (cancer) doctors to follow up with your infection.  Secondary Diagnosis:	Adenocarcinoma of gallbladder  Goal:	to treat cancer. Status post Whipple Procedure  Assessment and plan of treatment:	You have a history of cancer of the duct connected to your gallbladder. For this, we consulted our cancer doctors, who recommended you receive a MRI of the abdomen for further staging of your cancer.   You must follow up with the cancer doctors for continued treatment of your cancer. You must call to schedule an appointment at [facility].  Please follow up with Dr Bourne in 1-2 weeks. Call to schedule an appointment.  Please call Interventional Radiology to schedule a Tube check in 1 week. Call [phone]. Principal Discharge DX:	Cholangitis  Goal:	to treat infection  Assessment and plan of treatment:	In the hospital, you were found with an infection in your biliary tract. This was likely related to the cancer in the duct connected to your gallbladder. You were treated with antibiotics in the hospital and an ERCP procedure to relieve the obstruction in your duct was performed by the GI/stomach doctors.    You must follow up with the gastroenterology and oncology (cancer) doctors to follow up with your infection.  Secondary Diagnosis:	Adenocarcinoma of gallbladder  Goal:	to treat cancer. Status post Whipple Procedure  Assessment and plan of treatment:	You have a history of cancer of the duct connected to your gallbladder. For this, we consulted our cancer doctors, who recommended you receive a MRI of the abdomen for further staging of your cancer.   You must follow up with the cancer doctors for continued treatment of your cancer. You must call to schedule an appointment at [facility].  Please follow up with Dr Bourne in 1-2 weeks. Call to schedule an appointment.  Please call Interventional Radiology to schedule a Tube check in 1 week. Call [phone].  You are being discharged home on antibiotics, please continue through 3/6/19 per ID recs

## 2019-01-06 NOTE — DISCHARGE NOTE ADULT - MEDICATION SUMMARY - MEDICATIONS TO STOP TAKING
I will STOP taking the medications listed below when I get home from the hospital:  None I will STOP taking the medications listed below when I get home from the hospital:    Lovenox 40 mg/0.4 mL injectable solution  -- 40 milligram(s) subcutaneously once a day   -- It is very important that you take or use this exactly as directed.  Do not skip doses or discontinue unless directed by your doctor.    Lovenox 40 mg/0.4 mL injectable solution  -- 40 milligram(s) subcutaneously every 24 hours   dispense 28 day supply  -- It is very important that you take or use this exactly as directed.  Do not skip doses or discontinue unless directed by your doctor.

## 2019-01-06 NOTE — CONSULT NOTE ADULT - SUBJECTIVE AND OBJECTIVE BOX
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 Patient had acute onset of RUQ pain yesterday, with no inciting factors or relief with Aleve. The patient's family also endorsed 1-2 episodes of NBNB emesis yesterday and has had decreased PO intake over the last 36hours. As of diagnosis of cancer, the patient has not had follow up with oncology or surg-oncology due insurance issues. The patient has an appointment with [facility] on Wednesday for surgical evaluation.     Patient was recently discharged from [facility] (12/18-12/24) where she was found to have elevated liver enzymes (tbili 2.3, ALP in the 500s) and CBD dilatation up to 12mm and cholelithiasis on US, MRCP showing dilated common bile duct with an abrupt transition at the very distal common bile duct, concerning for bile duct stone or cholangiocarcinoma, s/p ERCP with plastic stent placement and biopsy confirming invasive ampullary adenocarcinoma.  After discharge, she was recommended to follow-up at [facility] [place] for ampullary adenocarcinoma, but could not due to insurance issues.     She was seen by GI and Surgery.  Plan for possible ERCP Monday 1/7 for possible cholangitis seen on CT A/P.  Blood cultures positive for E. coli bacteremia, currently on zosyn.         PAST MEDICAL & SURGICAL HISTORY:  Adenocarcinoma of gallbladder  Type 2 diabetes mellitus without complication, without long-term current use of insulin  Neuropathy  Arthritis  Mild intermittent asthma without complication  Gastroesophageal reflux disease without esophagitis  Essential hypertension  No significant past surgical history      Allergies  No Known Allergies      MEDICATIONS  (STANDING):  dextrose 5%. 1000 milliLiter(s) (50 mL/Hr) IV Continuous <Continuous>  dextrose 50% Injectable 12.5 Gram(s) IV Push once  dextrose 50% Injectable 25 Gram(s) IV Push once  dextrose 50% Injectable 25 Gram(s) IV Push once  DULoxetine 60 milliGRAM(s) Oral daily  gabapentin 100 milliGRAM(s) Oral three times a day  insulin lispro (HumaLOG) corrective regimen sliding scale   SubCutaneous every 6 hours  lactated ringers. 1000 milliLiter(s) (150 mL/Hr) IV Continuous <Continuous>  piperacillin/tazobactam IVPB. 3.375 Gram(s) IV Intermittent every 8 hours    MEDICATIONS  (PRN):  acetaminophen   Tablet .. 650 milliGRAM(s) Oral every 6 hours PRN Temp greater or equal to 38C (100.4F), Mild Pain (1 - 3)  benzonatate 100 milliGRAM(s) Oral three times a day PRN Cough  dextrose 40% Gel 15 Gram(s) Oral once PRN Blood Glucose LESS THAN 70 milliGRAM(s)/deciliter  glucagon  Injectable 1 milliGRAM(s) IntraMuscular once PRN Glucose LESS THAN 70 milligrams/deciliter  HYDROmorphone  Injectable 0.5 milliGRAM(s) IV Push every 6 hours PRN Severe Pain (7 - 10)  ondansetron Injectable 8 milliGRAM(s) IV Push every 6 hours PRN Nausea and/or Vomiting      FAMILY HISTORY:  No pertinent family history in first degree relatives      SOCIAL HISTORY: No EtOH, no tobacco    REVIEW OF SYSTEMS:    CONSTITUTIONAL: No weakness, fevers or chills  EYES/ENT: No visual changes;  No vertigo or throat pain   NECK: No pain or stiffness  RESPIRATORY: No cough, wheezing, hemoptysis; No shortness of breath  CARDIOVASCULAR: No chest pain or palpitations  GASTROINTESTINAL: No abdominal or epigastric pain. No nausea, vomiting, or hematemesis; No diarrhea or constipation. No melena or hematochezia.  GENITOURINARY: No dysuria, frequency or hematuria  NEUROLOGICAL: No numbness or weakness  SKIN: No itching, burning, rashes, or lesions   All other review of systems is negative unless indicated above.    Height (cm): 152.4 (01-05 @ 17:56)  Weight (kg): 57.4 (01-05 @ 17:56)  BMI (kg/m2): 24.7 (01-05 @ 17:56)  BSA (m2): 1.54 (01-05 @ 17:56)    T(F): 98.8 (01-06-19 @ 06:00), Max: 101.3 (01-05-19 @ 15:40)  HR: 72 (01-06-19 @ 06:00)  BP: 112/59 (01-06-19 @ 06:00)  RR: 18 (01-06-19 @ 06:00)  SpO2: 95% (01-06-19 @ 06:00)  Wt(kg): --    GENERAL: NAD, well-developed  HEAD:  Atraumatic, Normocephalic  EYES: EOMI, PERRLA, conjunctiva and sclera clear  NECK: Supple, No JVD  CHEST/LUNG: Clear to auscultation bilaterally; No wheeze  HEART: Regular rate and rhythm; No murmurs, rubs, or gallops  ABDOMEN: Soft, Nontender, Nondistended; Bowel sounds present  EXTREMITIES:  2+ Peripheral Pulses, No clubbing, cyanosis, or edema  NEUROLOGY: non-focal  SKIN: No rashes or lesions                          8.8    10.77 )-----------( 203      ( 06 Jan 2019 05:20 )             27.7       01-06    140  |  104  |  6<L>  ----------------------------<  113<H>  3.5   |  25  |  0.74    Ca    9.2      06 Jan 2019 05:20  Phos  3.1     01-06  Mg     1.7     01-06    TPro  5.9<L>  /  Alb  2.7<L>  /  TBili  3.9<H>  /  DBili  x   /  AST  93<H>  /  ALT  62<H>  /  AlkPhos  537<H>  01-06      Phosphorus Level, Serum: 3.1 mg/dL (01-06 @ 05:20)  Magnesium, Serum: 1.7 mg/dL (01-06 @ 05:20)      PT/INR - ( 05 Jan 2019 04:18 )   PT: 14.3 SEC;   INR: 1.25          PTT - ( 05 Jan 2019 04:18 )  PTT:25.6 SEC    URINE MIDSTREAM  01-05 @ 09:33 --  --  --      BLOOD PERIPHERAL  01-05 @ 06:03 --    ***Blood Panel PCR results on this specimen are available  approximately 3 hours after the Gram stain result***  Gram stain, PCR, and/or culture results may not always  correspond due to difference in methodologies  ------------------------------------------------------------  This PCR assay was performed using [employer].  The  following targets are tested for:  Enterococcus, vancomycin  resistant enterococci, Listeria monocytogenes,  coagulase  negative staphylococci, S. aureus, methicillin resistant S.  aureus, Streptococcus agalactiae (Group B), S. pneumoniae,  S. pyogenes (Group A), Acinetobacter baumannii, Enterobacter  cloacae, E. coli, Klebsiella oxytoca, K. pneumoniae, Proteus  sp., Serratia marcescens, Haemophilus influenzae, Neisseria  meningitidis, Pseudomonas aeruginosa, Candida albicans, C.  glabrata, C. krusei, C. parapsilosis, C. tropicalis and the  KPC resistance gene.  **NOTE: Due to technical problems, Proteus sp. will NOT be  reported as part of the BCID paneluntil further notice.  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 Patient had acute onset of RUQ pain yesterday, with no inciting factors or relief with Aleve. The patient's family also endorsed 1-2 episodes of NBNB emesis yesterday and has had decreased PO intake over the last 36hours. As of diagnosis of cancer, the patient has not had follow up with oncology or surg-oncology due insurance issues. The patient has an appointment with [facility] on Wednesday for surgical evaluation.     Patient was recently discharged from [facility] (12/18-12/24) where she was found to have elevated liver enzymes (tbili 2.3, ALP in the 500s) and CBD dilatation up to 12mm and cholelithiasis on US, MRCP showing dilated common bile duct with an abrupt transition at the very distal common bile duct, concerning for bile duct stone or cholangiocarcinoma, s/p ERCP with plastic stent placement and biopsy confirming invasive ampullary adenocarcinoma.  After discharge, she was [place] [facility] [place], but could not due to insurance issues.     She was seen by GI and Surgery.  Plan for possible ERCP Monday 1/7 for possible cholangitis seen on CT A/P.  Blood cultures positive for E. coli bacteremia, currently on zosyn.         PAST MEDICAL & SURGICAL HISTORY:  Adenocarcinoma of gallbladder  Type 2 diabetes mellitus without complication, without long-term current use of insulin  Neuropathy  Arthritis  Mild intermittent asthma without complication  Gastroesophageal reflux disease without esophagitis  Essential hypertension  No significant past surgical history      Allergies  No Known Allergies      MEDICATIONS  (STANDING):  dextrose 5%. 1000 milliLiter(s) (50 mL/Hr) IV Continuous <Continuous>  dextrose 50% Injectable 12.5 Gram(s) IV Push once  dextrose 50% Injectable 25 Gram(s) IV Push once  dextrose 50% Injectable 25 Gram(s) IV Push once  DULoxetine 60 milliGRAM(s) Oral daily  gabapentin 100 milliGRAM(s) Oral three times a day  insulin lispro (HumaLOG) corrective regimen sliding scale   SubCutaneous every 6 hours  lactated ringers. 1000 milliLiter(s) (150 mL/Hr) IV Continuous <Continuous>  piperacillin/tazobactam IVPB. 3.375 Gram(s) IV Intermittent every 8 hours    MEDICATIONS  (PRN):  acetaminophen   Tablet .. 650 milliGRAM(s) Oral every 6 hours PRN Temp greater or equal to 38C (100.4F), Mild Pain (1 - 3)  benzonatate 100 milliGRAM(s) Oral three times a day PRN Cough  dextrose 40% Gel 15 Gram(s) Oral once PRN Blood Glucose LESS THAN 70 milliGRAM(s)/deciliter  glucagon  Injectable 1 milliGRAM(s) IntraMuscular once PRN Glucose LESS THAN 70 milligrams/deciliter  HYDROmorphone  Injectable 0.5 milliGRAM(s) IV Push every 6 hours PRN Severe Pain (7 - 10)  ondansetron Injectable 8 milliGRAM(s) IV Push every 6 hours PRN Nausea and/or Vomiting      FAMILY HISTORY:  No pertinent family history in first degree relatives      SOCIAL HISTORY: No EtOH, no tobacco      REVIEW OF SYSTEMS:  CONSTITUTIONAL: No weakness, fevers or chills  EYES/ENT: No visual changes;  No vertigo or throat pain   NECK: No pain or stiffness  RESPIRATORY: No cough, wheezing, hemoptysis; No shortness of breath  CARDIOVASCULAR: No chest pain or palpitations  GASTROINTESTINAL: No abdominal or epigastric pain. No nausea, vomiting, or hematemesis; No diarrhea or constipation. No melena or hematochezia.  GENITOURINARY: No dysuria, frequency or hematuria  NEUROLOGICAL: No numbness or weakness  SKIN: No itching, burning, rashes, or lesions   All other review of systems is negative unless indicated above.      Height (cm): 152.4 (01-05 @ 17:56)  Weight (kg): 57.4 (01-05 @ 17:56)  BMI (kg/m2): 24.7 (01-05 @ 17:56)  BSA (m2): 1.54 (01-05 @ 17:56)    T(F): 98.8 (01-06-19 @ 06:00), Max: 101.3 (01-05-19 @ 15:40)  HR: 72 (01-06-19 @ 06:00)  BP: 112/59 (01-06-19 @ 06:00)  RR: 18 (01-06-19 @ 06:00)  SpO2: 95% (01-06-19 @ 06:00)      GENERAL: NAD, well-developed  HEAD:  Atraumatic, Normocephalic  EYES: EOMI, scleral icterus   NECK: Supple  CHEST/LUNG: Clear to auscultation bilaterally; No wheezes or crackles   HEART: Regular rate and rhythm  ABDOMEN: Soft, Nontender, Nondistended  EXTREMITIES: No clubbing, cyanosis, or edema  NEUROLOGY: non-focal  SKIN: No rashes or lesions                          8.8    10.77 )-----------( 203      ( 06 Jan 2019 05:20 )             27.7       01-06    140  |  104  |  6<L>  ----------------------------<  113<H>  3.5   |  25  |  0.74    Ca    9.2      06 Jan 2019 05:20  Phos  3.1     01-06  Mg     1.7     01-06    TPro  5.9<L>  /  Alb  2.7<L>  /  TBili  3.9<H>  /  DBili  x   /  AST  93<H>  /  ALT  62<H>  /  AlkPhos  537<H>  01-06      Phosphorus Level, Serum: 3.1 mg/dL (01-06 @ 05:20)  Magnesium, Serum: 1.7 mg/dL (01-06 @ 05:20)      PT/INR - ( 05 Jan 2019 04:18 )   PT: 14.3 SEC;   INR: 1.25          PTT - ( 05 Jan 2019 04:18 )  PTT:25.6 SEC      BLOOD PERIPHERAL  01-05 @ 06:03 --    ***Blood Panel PCR results on this specimen are available  approximately 3 hours after the Gram stain result***  Gram stain, PCR, and/or culture results may not always  correspond due to difference in methodologies  ------------------------------------------------------------  This PCR assay was performed using [employer].  The  following targets are tested for:  Enterococcus, vancomycin  resistant enterococci, Listeria monocytogenes,  coagulase  negative staphylococci, S. aureus, methicillin resistant S.  aureus, Streptococcus agalactiae (Group B), S. pneumoniae,  S. pyogenes (Group A), Acinetobacter baumannii, Enterobacter  cloacae, E. coli, Klebsiella oxytoca, K. pneumoniae, Proteus  sp., Serratia marcescens, Haemophilus influenzae, Neisseria  meningitidis, Pseudomonas aeruginosa, Candida albicans, C.  glabrata, C. krusei, C. parapsilosis, C. tropicalis and the  KPC resistance gene.  **NOTE: Due to technical problems, Proteus sp. will NOT be  reported as part of the BCID paneluntil further notice.  BLOOD CULTURE PCR

## 2019-01-06 NOTE — DISCHARGE NOTE ADULT - MEDICATION SUMMARY - MEDICATIONS TO TAKE
I will START or STAY ON the medications listed below when I get home from the hospital:    Rolling Walker  -- 1 unit(s)  4 times a day   -- Indication: For Ambulation    Flagyl 500 mg oral tablet  -- 1 tab(s) by mouth 3 times a day   -- Do not drink alcoholic beverages when taking this medication.  Finish all this medication unless otherwise directed by prescriber.  May discolor urine or feces.    -- Indication: For Abdominal collection    HYDROmorphone 2 mg oral tablet  -- 1 tab(s) by mouth every 4 hours, As needed, Moderate Pain (4 - 6) MDD:6  -- Indication: For Pain    acetaminophen 325 mg oral tablet  -- 2 tab(s) by mouth every 6 hours, As needed, Mild Pain (1 - 3)  -- Indication: For Pain    Lovenox 40 mg/0.4 mL injectable solution  -- 40 milligram(s) subcutaneously every 24 hours   dispense 28 day supply  -- It is very important that you take or use this exactly as directed.  Do not skip doses or discontinue unless directed by your doctor.    -- Indication: For DVT prophylaxis    gabapentin 100 mg oral capsule  -- 1 cap(s) by mouth 3 times a day  -- Indication: For Pain    DULoxetine 60 mg oral delayed release capsule  -- 1 cap(s) by mouth once a day  -- Indication: For Depression    pioglitazone 15 mg oral tablet  -- 1 tab(s) by mouth once a day  -- Indication: For Diabetes    atorvastatin 10 mg oral tablet  -- 1 tab(s) by mouth once a day (at bedtime)  -- Indication: For Hyperlipidemia    guaiFENesin 100 mg/5 mL oral liquid  -- 5 milliliter(s) by mouth every 6 hours, As needed, Cough  -- Indication: For Cough    docusate sodium 100 mg oral capsule  -- 1 cap(s) by mouth 3 times a day, As Needed  -- Indication: For Constipation    montelukast 10 mg oral tablet  -- 1 tab(s) by mouth once a day  -- Indication: For Allergy    omeprazole 40 mg oral delayed release capsule  -- 1 cap(s) by mouth once a day  -- Indication: For GERD    ciprofloxacin 500 mg oral tablet  -- 1 tab(s) by mouth 2 times a day   -- Avoid prolonged or excessive exposure to direct and/or artificial sunlight while taking this medication.  Check with your doctor before becoming pregnant.  Do not take dairy products, antacids, or iron preparations within one hour of this medication.  Finish all this medication unless otherwise directed by prescriber.  Medication should be taken with plenty of water.    -- Indication: For Abdominal Collection I will START or STAY ON the medications listed below when I get home from the hospital:    Rolling Walker  -- 1 unit(s)  4 times a day   -- Indication: For for walking    Flagyl 500 mg oral tablet  -- 1 tab(s) by mouth 3 times a day   -- Do not drink alcoholic beverages when taking this medication.  Finish all this medication unless otherwise directed by prescriber.  May discolor urine or feces.    -- Indication: For Antibiotic    acetaminophen 325 mg oral tablet  -- 2 tab(s) by mouth every 6 hours, As needed, Mild Pain (1 - 3)  -- Indication: For Pain medication    Lovenox 40 mg/0.4 mL injectable solution  -- 40 milligram(s) subcutaneously every 24 hours   dispense 28 day supply  -- It is very important that you take or use this exactly as directed.  Do not skip doses or discontinue unless directed by your doctor.    -- Indication: For Anticoagulation    gabapentin 100 mg oral capsule  -- 1 cap(s) by mouth 3 times a day  -- Indication: For Neurologic medication    DULoxetine 60 mg oral delayed release capsule  -- 1 cap(s) by mouth once a day  -- Indication: For Antidepressant    pioglitazone 15 mg oral tablet  -- 1 tab(s) by mouth once a day  -- Indication: For diabetic medication    atorvastatin 10 mg oral tablet  -- 1 tab(s) by mouth once a day (at bedtime)  -- Indication: For Cholesterol medication    guaiFENesin 100 mg/5 mL oral liquid  -- 5 milliliter(s) by mouth every 6 hours, As needed, Cough  -- Indication: For Cough medicine if needed    docusate sodium 100 mg oral capsule  -- 1 cap(s) by mouth 3 times a day, As Needed  -- Indication: For laxative    montelukast 10 mg oral tablet  -- 1 tab(s) by mouth once a day  -- Indication: For respiratory medication    omeprazole 40 mg oral delayed release capsule  -- 1 cap(s) by mouth once a day  -- Indication: For Pgastrointestinal medication    ciprofloxacin 500 mg oral tablet  -- 1 tab(s) by mouth 2 times a day   -- Avoid prolonged or excessive exposure to direct and/or artificial sunlight while taking this medication.  Check with your doctor before becoming pregnant.  Do not take dairy products, antacids, or iron preparations within one hour of this medication.  Finish all this medication unless otherwise directed by prescriber.  Medication should be taken with plenty of water.    -- Indication: For Antibiotic I will START or STAY ON the medications listed below when I get home from the hospital:    Rolling Walker  -- 1 unit(s)  4 times a day   -- Indication: For for walking    CPCS code    -- please dispense 1 box per month    ICD 10 code K63.2  -- Indication: For fistual care    normal saline flush  -- please flush IR drain with 10cc NS once a day  -- Indication: For drain care    Flagyl 500 mg oral tablet  -- 1 tab(s) by mouth 3 times a day, please continue though 3/6/19  -- Do not drink alcoholic beverages when taking this medication.  Finish all this medication unless otherwise directed by prescriber.  May discolor urine or feces.    -- Indication: For Antibiotic    acetaminophen 325 mg oral tablet  -- 2 tab(s) by mouth every 6 hours, As needed, Mild Pain (1 - 3)  -- Indication: For Pain medication    gabapentin 100 mg oral capsule  -- 1 cap(s) by mouth 3 times a day  -- Indication: For Neurologic medication    DULoxetine 60 mg oral delayed release capsule  -- 1 cap(s) by mouth once a day  -- Indication: For Antidepressant    pioglitazone 15 mg oral tablet  -- 1 tab(s) by mouth once a day  -- Indication: For diabetic medication    atorvastatin 10 mg oral tablet  -- 1 tab(s) by mouth once a day (at bedtime)  -- Indication: For Cholesterol medication    guaiFENesin 100 mg/5 mL oral liquid  -- 5 milliliter(s) by mouth every 6 hours, As needed, Cough  -- Indication: For Cough medicine if needed    montelukast 10 mg oral tablet  -- 1 tab(s) by mouth once a day  -- Indication: For respiratory medication    omeprazole 40 mg oral delayed release capsule  -- 1 cap(s) by mouth once a day  -- Indication: For Pgastrointestinal medication    ciprofloxacin 500 mg oral tablet  -- 1 tab(s) by mouth 2 times a day, please continue through 3/6/19  -- Avoid prolonged or excessive exposure to direct and/or artificial sunlight while taking this medication.  Check with your doctor before becoming pregnant.  Do not take dairy products, antacids, or iron preparations within one hour of this medication.  Finish all this medication unless otherwise directed by prescriber.  Medication should be taken with plenty of water.    -- Indication: For Antibiotic

## 2019-01-06 NOTE — PROGRESS NOTE ADULT - SUBJECTIVE AND OBJECTIVE BOX
Lucas Da Silva MD PGY1   Pager: [phone] MANINDER, [phone] [facility]  After 7: Night Float pager    Patient is a 53y old  Female who presents with a chief complaint of cholangitis (2019 15:06)      SUBJECTIVE / OVERNIGHT EVENTS: Overnight, no acute events. Patient seen and examined at bedside this AM, reporting epigastric pain during coughing. Otherwise, she is denying fever, N/V, other acute complaints.     MEDICATIONS  (STANDING):  dextrose 5%. 1000 milliLiter(s) (50 mL/Hr) IV Continuous <Continuous>  dextrose 50% Injectable 12.5 Gram(s) IV Push once  dextrose 50% Injectable 25 Gram(s) IV Push once  dextrose 50% Injectable 25 Gram(s) IV Push once  DULoxetine 60 milliGRAM(s) Oral daily  gabapentin 100 milliGRAM(s) Oral three times a day  insulin lispro (HumaLOG) corrective regimen sliding scale   SubCutaneous every 6 hours  lactated ringers. 1000 milliLiter(s) (150 mL/Hr) IV Continuous <Continuous>  piperacillin/tazobactam IVPB. 3.375 Gram(s) IV Intermittent every 8 hours    MEDICATIONS  (PRN):  acetaminophen   Tablet .. 650 milliGRAM(s) Oral every 6 hours PRN Temp greater or equal to 38C (100.4F), Mild Pain (1 - 3)  dextrose 40% Gel 15 Gram(s) Oral once PRN Blood Glucose LESS THAN 70 milliGRAM(s)/deciliter  glucagon  Injectable 1 milliGRAM(s) IntraMuscular once PRN Glucose LESS THAN 70 milligrams/deciliter  HYDROmorphone  Injectable 0.5 milliGRAM(s) IV Push every 6 hours PRN Severe Pain (7 - 10)  ondansetron Injectable 8 milliGRAM(s) IV Push every 6 hours PRN Nausea and/or Vomiting      Vital Signs Last 24 Hrs  T(C): 37.1 (2019 06:00), Max: 38.5 (2019 15:40)  T(F): 98.8 (2019 06:00), Max: 101.3 (2019 15:40)  HR: 72 (2019 06:00) (69 - 98)  BP: 112/59 (2019 06:00) (92/50 - 113/58)  BP(mean): --  RR: 18 (2019 06:00) (16 - 19)  SpO2: 95% (2019 06:00) (95% - 100%)  CAPILLARY BLOOD GLUCOSE      POCT Blood Glucose.: 113 mg/dL (2019 07:47)  POCT Blood Glucose.: 122 mg/dL (2019 22:15)  POCT Blood Glucose.: 124 mg/dL (2019 18:42)  POCT Blood Glucose.: 181 mg/dL (2019 09:48)    I&O's Summary    2019 07:01  -  2019 07:00  --------------------------------------------------------  IN: 1850 mL / OUT: 0 mL / NET: 1850 mL        PHYSICAL EXAM:  GENERAL: NAD, well-developed  EYES: icteric sclera  NECK:  No JVD  CHEST/LUNG: CTABL ; No wheeze  HEART: RRR; No murmurs  ABDOMEN: tenderness to palpation of right upper and lower quadrants.   : No Swanson  EXTREMITIES:  2+ Peripheral Pulses, No edema  PSYCH: AAOx3  NEUROLOGY: non-focal  SKIN: jaundice    LABS:                        8.8    10.77 )-----------( 203      ( 2019 05:20 )             27.7     01-06    140  |  104  |  6<L>  ----------------------------<  113<H>  3.5   |  25  |  0.74    Ca    9.2      2019 05:20  Phos  3.1     01-06  Mg     1.7     -06    TPro  5.9<L>  /  Alb  2.7<L>  /  TBili  3.9<H>  /  DBili  x   /  AST  93<H>  /  ALT  62<H>  /  AlkPhos  537<H>  -06    PT/INR - ( 2019 04:18 )   PT: 14.3 SEC;   INR: 1.25          PTT - ( 2019 04:18 )  PTT:25.6 SEC      Urinalysis Basic - ( 2019 07:45 )    Color: YELLOW / Appearance: CLEAR / S.039 / pH: 8.0  Gluc: NEGATIVE / Ketone: NEGATIVE  / Bili: NEGATIVE / Urobili: NORMAL   Blood: NEGATIVE / Protein: 10 / Nitrite: NEGATIVE   Leuk Esterase: NEGATIVE / RBC: x / WBC x   Sq Epi: x / Non Sq Epi: x / Bacteria: x        Microbiology;        RADIOLOGY & ADDITIONAL TESTS:    Imaging Personally Reviewed:          Consultant(s) Notes Reviewed:  gastroenterology     Care Discussed with Consultants/Other Providers: gastroenterology

## 2019-01-07 DIAGNOSIS — E87.6 HYPOKALEMIA: ICD-10-CM

## 2019-01-07 DIAGNOSIS — R78.81 BACTEREMIA: ICD-10-CM

## 2019-01-07 LAB
ALBUMIN SERPL ELPH-MCNC: 2.8 G/DL — LOW (ref 3.3–5)
ALP SERPL-CCNC: 517 U/L — HIGH (ref 40–120)
ALT FLD-CCNC: 50 U/L — HIGH (ref 4–33)
APTT BLD: 30.6 SEC — SIGNIFICANT CHANGE UP (ref 27.5–36.3)
AST SERPL-CCNC: 62 U/L — HIGH (ref 4–32)
BACTERIA UR CULT: SIGNIFICANT CHANGE UP
BASOPHILS # BLD AUTO: 0.04 K/UL — SIGNIFICANT CHANGE UP (ref 0–0.2)
BASOPHILS NFR BLD AUTO: 0.4 % — SIGNIFICANT CHANGE UP (ref 0–2)
BILIRUB SERPL-MCNC: 2.6 MG/DL — HIGH (ref 0.2–1.2)
BLD GP AB SCN SERPL QL: NEGATIVE — SIGNIFICANT CHANGE UP
BUN SERPL-MCNC: 6 MG/DL — LOW (ref 7–23)
CALCIUM SERPL-MCNC: 9.5 MG/DL — SIGNIFICANT CHANGE UP (ref 8.4–10.5)
CHLORIDE SERPL-SCNC: 100 MMOL/L — SIGNIFICANT CHANGE UP (ref 98–107)
CO2 SERPL-SCNC: 24 MMOL/L — SIGNIFICANT CHANGE UP (ref 22–31)
CREAT SERPL-MCNC: 0.69 MG/DL — SIGNIFICANT CHANGE UP (ref 0.5–1.3)
EOSINOPHIL # BLD AUTO: 0.12 K/UL — SIGNIFICANT CHANGE UP (ref 0–0.5)
EOSINOPHIL NFR BLD AUTO: 1.3 % — SIGNIFICANT CHANGE UP (ref 0–6)
GLUCOSE BLDC GLUCOMTR-MCNC: 103 MG/DL — HIGH (ref 70–99)
GLUCOSE BLDC GLUCOMTR-MCNC: 99 MG/DL — SIGNIFICANT CHANGE UP (ref 70–99)
GLUCOSE SERPL-MCNC: 98 MG/DL — SIGNIFICANT CHANGE UP (ref 70–99)
HCG SERPL-ACNC: < 5 MIU/ML — SIGNIFICANT CHANGE UP
HCT VFR BLD CALC: 29 % — LOW (ref 34.5–45)
HGB BLD-MCNC: 9.5 G/DL — LOW (ref 11.5–15.5)
IMM GRANULOCYTES NFR BLD AUTO: 0.5 % — SIGNIFICANT CHANGE UP (ref 0–1.5)
INR BLD: 1.4 — HIGH (ref 0.88–1.17)
LYMPHOCYTES # BLD AUTO: 2.19 K/UL — SIGNIFICANT CHANGE UP (ref 1–3.3)
LYMPHOCYTES # BLD AUTO: 23.8 % — SIGNIFICANT CHANGE UP (ref 13–44)
MAGNESIUM SERPL-MCNC: 1.8 MG/DL — SIGNIFICANT CHANGE UP (ref 1.6–2.6)
MCHC RBC-ENTMCNC: 25.1 PG — LOW (ref 27–34)
MCHC RBC-ENTMCNC: 32.8 % — SIGNIFICANT CHANGE UP (ref 32–36)
MCV RBC AUTO: 76.5 FL — LOW (ref 80–100)
MONOCYTES # BLD AUTO: 0.61 K/UL — SIGNIFICANT CHANGE UP (ref 0–0.9)
MONOCYTES NFR BLD AUTO: 6.6 % — SIGNIFICANT CHANGE UP (ref 2–14)
NEUTROPHILS # BLD AUTO: 6.19 K/UL — SIGNIFICANT CHANGE UP (ref 1.8–7.4)
NEUTROPHILS NFR BLD AUTO: 67.4 % — SIGNIFICANT CHANGE UP (ref 43–77)
NRBC # FLD: 0 K/UL — LOW (ref 25–125)
ORGANISM # SPEC MICROSCOPIC CNT: SIGNIFICANT CHANGE UP
ORGANISM # SPEC MICROSCOPIC CNT: SIGNIFICANT CHANGE UP
PHOSPHATE SERPL-MCNC: 3.2 MG/DL — SIGNIFICANT CHANGE UP (ref 2.5–4.5)
PLATELET # BLD AUTO: 211 K/UL — SIGNIFICANT CHANGE UP (ref 150–400)
PMV BLD: 10.6 FL — SIGNIFICANT CHANGE UP (ref 7–13)
POTASSIUM SERPL-MCNC: 3.4 MMOL/L — LOW (ref 3.5–5.3)
POTASSIUM SERPL-SCNC: 3.4 MMOL/L — LOW (ref 3.5–5.3)
PROT SERPL-MCNC: 6.5 G/DL — SIGNIFICANT CHANGE UP (ref 6–8.3)
PROTHROM AB SERPL-ACNC: 16.1 SEC — HIGH (ref 9.8–13.1)
RBC # BLD: 3.79 M/UL — LOW (ref 3.8–5.2)
RBC # FLD: 15 % — HIGH (ref 10.3–14.5)
RH IG SCN BLD-IMP: POSITIVE — SIGNIFICANT CHANGE UP
SODIUM SERPL-SCNC: 138 MMOL/L — SIGNIFICANT CHANGE UP (ref 135–145)
WBC # BLD: 9.2 K/UL — SIGNIFICANT CHANGE UP (ref 3.8–10.5)
WBC # FLD AUTO: 9.2 K/UL — SIGNIFICANT CHANGE UP (ref 3.8–10.5)

## 2019-01-07 PROCEDURE — 71260 CT THORAX DX C+: CPT | Mod: 26

## 2019-01-07 PROCEDURE — 99233 SBSQ HOSP IP/OBS HIGH 50: CPT | Mod: GC

## 2019-01-07 PROCEDURE — 43276 ERCP STENT EXCHANGE W/DILATE: CPT | Mod: GC

## 2019-01-07 RX ORDER — POTASSIUM CHLORIDE 20 MEQ
10 PACKET (EA) ORAL
Qty: 0 | Refills: 0 | Status: COMPLETED | OUTPATIENT
Start: 2019-01-07 | End: 2019-01-07

## 2019-01-07 RX ORDER — BENZOCAINE AND MENTHOL 5; 1 G/100ML; G/100ML
1 LIQUID ORAL EVERY 4 HOURS
Qty: 0 | Refills: 0 | Status: DISCONTINUED | OUTPATIENT
Start: 2019-01-07 | End: 2019-01-18

## 2019-01-07 RX ORDER — INSULIN LISPRO 100/ML
VIAL (ML) SUBCUTANEOUS
Qty: 0 | Refills: 0 | Status: DISCONTINUED | OUTPATIENT
Start: 2019-01-07 | End: 2019-01-11

## 2019-01-07 RX ADMIN — SODIUM CHLORIDE 150 MILLILITER(S): 9 INJECTION, SOLUTION INTRAVENOUS at 06:34

## 2019-01-07 RX ADMIN — Medication 100 MILLIEQUIVALENT(S): at 17:39

## 2019-01-07 RX ADMIN — PIPERACILLIN AND TAZOBACTAM 25 GRAM(S): 4; .5 INJECTION, POWDER, LYOPHILIZED, FOR SOLUTION INTRAVENOUS at 06:32

## 2019-01-07 RX ADMIN — Medication 100 MILLIEQUIVALENT(S): at 12:26

## 2019-01-07 RX ADMIN — PIPERACILLIN AND TAZOBACTAM 25 GRAM(S): 4; .5 INJECTION, POWDER, LYOPHILIZED, FOR SOLUTION INTRAVENOUS at 19:33

## 2019-01-07 RX ADMIN — GABAPENTIN 100 MILLIGRAM(S): 400 CAPSULE ORAL at 06:32

## 2019-01-07 RX ADMIN — Medication 2: at 22:29

## 2019-01-07 RX ADMIN — GABAPENTIN 100 MILLIGRAM(S): 400 CAPSULE ORAL at 21:59

## 2019-01-07 RX ADMIN — BENZOCAINE AND MENTHOL 1 LOZENGE: 5; 1 LIQUID ORAL at 19:37

## 2019-01-07 RX ADMIN — Medication 100 MILLIEQUIVALENT(S): at 10:47

## 2019-01-07 RX ADMIN — SODIUM CHLORIDE 150 MILLILITER(S): 9 INJECTION, SOLUTION INTRAVENOUS at 17:39

## 2019-01-07 NOTE — PROGRESS NOTE ADULT - PROBLEM SELECTOR PLAN 4
- NPO for now per GI given ERCP Monday or sooner PRN if clinically destabilizes   - q6h Finger sticks   - Lispro ISS   - A1C 8.0 on 12/19 - A1C 8.0 on 12/19  - NPO for now per GI given ERCP  - q6h FS, ISS - Recent biopsy on 12/22 shows invasive adenocarcinoma in the CBD  - ERCP performed on 12/22 with placement of plastic biliary stent   - Has not had oncology follow up due to insurance issues   - Onc: MRI abdomen/pelvis w/ contrast for characterization of liver lesion, CT Chest to complete staging  - Appreciate Surg onc recs, but patient not a candidate for surgery at this time due to active infection

## 2019-01-07 NOTE — PROGRESS NOTE ADULT - SUBJECTIVE AND OBJECTIVE BOX
[facility] Surgical Oncology Progress Note    Subjective: Patient seen and examined.  No events overnight.  Abdominal pain improving.  Denies N/V.     Vital Signs Last 24 Hrs  T(C): 36.6 (01-07-19 @ 06:00), Max: 37.1 (01-06-19 @ 15:25)  HR: 70 (01-07-19 @ 06:00) (68 - 77)  BP: 127/72 (01-07-19 @ 06:00) (115/58 - 133/79)  RR: 18 (01-07-19 @ 06:00) (18 - 20)  SpO2: 95% (01-07-19 @ 06:00) (92% - 99%)  Wt(kg): --    01-06 @ 07:01  -  01-07 @ 07:00  --------------------------------------------------------  IN:    IV PiggyBack: 200 mL    lactated ringers.: 1800 mL    Oral Fluid: 100 mL  Total IN: 2100 mL    OUT:  Total OUT: 0 mL    Total NET: 2100 mL          Physical Exam:  General: Well developed, well nourished, No Acute Distress, uncomfortable  HEENT: Normocephalic Atraumatic, EOMI bl  Neurology: A&Ox3  Abdominal: Soft, RUQ tenderness, mild epigastric tenderness, moderately distended  Extremities: No Clubbing, cyanosis or edema, FROM  Skin: warm, dry, normal color, no rash or abnormal lesions    LABS:                         9.5    9.20  )-----------( 211      ( 07 Jan 2019 05:25 )             29.0     01-07    138  |  100  |  6<L>  ----------------------------<  98  3.4<L>   |  24  |  0.69    Ca    9.5      07 Jan 2019 05:25  Phos  3.2     01-07  Mg     1.8     01-07    TPro  6.5  /  Alb  2.8<L>  /  TBili  2.6<H>  /  DBili  x   /  AST  62<H>  /  ALT  50<H>  /  AlkPhos  517<H>  01-07    LIVER FUNCTIONS - ( 07 Jan 2019 05:25 )  Alb: 2.8 g/dL / Pro: 6.5 g/dL / ALK PHOS: 517 u/L / ALT: 50 u/L / AST: 62 u/L / GGT: x           PT/INR - ( 07 Jan 2019 05:25 )   PT: 16.1 SEC;   INR: 1.40          PTT - ( 07 Jan 2019 05:25 )  PTT:30.6 SEC

## 2019-01-07 NOTE — PROGRESS NOTE ADULT - ATTENDING COMMENTS
Patient seen and examined at bedside with HS. Daughter present and providing Chinese interpretation. Patient c/o feeling weak but overall improved since admission. Reports intermittent RUQ pain but denies it at present. On exam, I do not appreciate icterus/jaundice at this time. Abdominal exam with normoactive BS, soft, NT, ND at this time. Plan for ERCP today given concerns for cholangitis. Continue with Zosyn for E. coli bacteremia and f/u repeat cultures. Will order CT chest and MRI of abdomen for further staging purposes and to determine if patient would be an eventual surgical candidate. Spoke with family re: patient's diagnosis of cholangiocarcinoma. The patient is aware of her diagnosis at present.

## 2019-01-07 NOTE — PROGRESS NOTE ADULT - ASSESSMENT
53 y.o. 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

## 2019-01-07 NOTE — CHART NOTE - NSCHARTNOTEFT_GEN_A_CORE
GI Post Procedure     ERCP 1/7/2018    Impression:   - During the EGD, one stent was removed from the biliary tree.   - ERCP was then done with one new plastic stent placed into the common bile duct.     Recommendation:   - Return patient to [facility] for ongoing care.   - Clear liquid diet.   - Continue antibiotics.   - Continue IVF LR  - Patient needs to follow with surg onc for resection of the ampullary adenocarcinoma.      Joann Go, PGY-5  GI fellow  B- [phone]/ [phone]  Please call GI fellow on call after 5pm and on weekends

## 2019-01-07 NOTE — PROGRESS NOTE ADULT - PROBLEM SELECTOR PLAN 1
- pt has RUQ pain, jaundice and fever, fulfilling charcot's traid   - Has a known tumor in the CBD and recent ERCP, put at increase risk fo cholangitis   - c/w Zosyn (started on 1/5)  - c/w IVF   - Appreciate GI recs: plan for ERCP on Monday or earlier PRN - RUQ pain, jaundice and fever, fulfilling charcot's traid   - Has a known tumor in the CBD and recent ERCP, put at increase risk of cholangitis   - c/w Zosyn (started on 1/5)  - Appreciate GI recs: plan for ERCP today - RUQ pain, jaundice at admission, and fever, fulfilling charcot's traid   - Has a known tumor in the CBD and recent ERCP, put at increase risk of cholangitis   - c/w Zosyn (started on 1/5)  - Appreciate GI recs: plan for ERCP today

## 2019-01-07 NOTE — PROGRESS NOTE ADULT - PROBLEM SELECTOR PROBLEM 4
Type 2 diabetes mellitus without complication, without long-term current use of insulin Adenocarcinoma of gallbladder

## 2019-01-07 NOTE — PROGRESS NOTE ADULT - ASSESSMENT
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 bili. Patient clinically improving with medical management.    Plan/recommendation:  - Continue fluid resuscitation, care per primary team  - please obtain MRI with contrast to evaluate liver lesion in segment 7  - Appreciate GI consult, ERCP today, will follow result      MILAGROS Kaur PGY2  Surgical Oncology (D Team)  p. [phone] 53 year old woman w/ PMH sig for invasive ampullary adenocarcinoma who presents with fever, leukocytosis, and elevated t. bili. T bili and alk phos uptrending.    Plan/recommendation:  - Continue fluid resuscitation, care per primary team  - please obtain MRI with contrast to evaluate liver lesion in segment 7  - Appreciate GI consult, ERCP today, will follow result      MILAGROS Kaur PGY2  Surgical Oncology (D Team)  p. [phone]

## 2019-01-07 NOTE — PROGRESS NOTE ADULT - SUBJECTIVE AND OBJECTIVE BOX
Brandon Zhang, PGY1  Pager [phone]    Patient is a 53y old  Female who presents with a chief complaint of cholangitis (07 Jan 2019 06:40)      SUBJECTIVE/INTERVAL EVENTS: Patient seen and examined at bedside. JAILYN o/n. States feels weak. Is NPO, no n/v or abd pain.    MEDICATIONS  (STANDING):  dextrose 5%. 1000 milliLiter(s) (50 mL/Hr) IV Continuous <Continuous>  dextrose 50% Injectable 12.5 Gram(s) IV Push once  dextrose 50% Injectable 25 Gram(s) IV Push once  dextrose 50% Injectable 25 Gram(s) IV Push once  DULoxetine 60 milliGRAM(s) Oral daily  gabapentin 100 milliGRAM(s) Oral three times a day  insulin lispro (HumaLOG) corrective regimen sliding scale   SubCutaneous every 6 hours  lactated ringers. 1000 milliLiter(s) (150 mL/Hr) IV Continuous <Continuous>  piperacillin/tazobactam IVPB. 3.375 Gram(s) IV Intermittent every 8 hours    MEDICATIONS  (PRN):  acetaminophen   Tablet .. 650 milliGRAM(s) Oral every 6 hours PRN Temp greater or equal to 38C (100.4F), Mild Pain (1 - 3)  benzonatate 100 milliGRAM(s) Oral three times a day PRN Cough  dextrose 40% Gel 15 Gram(s) Oral once PRN Blood Glucose LESS THAN 70 milliGRAM(s)/deciliter  glucagon  Injectable 1 milliGRAM(s) IntraMuscular once PRN Glucose LESS THAN 70 milligrams/deciliter  ondansetron Injectable 8 milliGRAM(s) IV Push every 6 hours PRN Nausea and/or Vomiting  traMADol 50 milliGRAM(s) Oral every 8 hours PRN Severe Pain (7 - 10)      VITAL SIGNS:  T(F): 97.9 (01-07-19 @ 06:00), Max: 98.8 (01-06-19 @ 15:25)  HR: 70 (01-07-19 @ 06:00) (68 - 77)  BP: 127/72 (01-07-19 @ 06:00) (115/58 - 133/79)  RR: 18 (01-07-19 @ 06:00) (18 - 20)  SpO2: 95% (01-07-19 @ 06:00) (92% - 99%)    I&O's Summary    06 Jan 2019 07:01  -  07 Jan 2019 07:00  --------------------------------------------------------  IN: 2100 mL / OUT: 0 mL / NET: 2100 mL      Daily     Daily     PHYSICAL EXAM:  Gen: Alert, well-developed, NAD  HEENT: NCAT, EOMI, conjunctiva clear, sclera anicteric  Neck: Supple, no JVD  CV: RRR, S1S2, no m/r/g  Resp: CTAB, normal respiratory effort  Abd: Soft, nontender, nondistended, normal bowel sounds  Ext: no edema, clubbing, or cyanosis  Neuro: AOx3, no focal deficits  SKIN: No rashes or lesions    LABS:                        9.5    9.20  )-----------( 211      ( 07 Jan 2019 05:25 )             29.0     01-07    138  |  100  |  6<L>  ----------------------------<  98  3.4<L>   |  24  |  0.69    Ca    9.5      07 Jan 2019 05:25  Phos  3.2     01-07  Mg     1.8     01-07    TPro  6.5  /  Alb  2.8<L>  /  TBili  2.6<H>  /  DBili  x   /  AST  62<H>  /  ALT  50<H>  /  AlkPhos  517<H>  01-07    LIVER FUNCTIONS - ( 07 Jan 2019 05:25 )  Alb: 2.8 g/dL / Pro: 6.5 g/dL / ALK PHOS: 517 u/L / ALT: 50 u/L / AST: 62 u/L / GGT: x           PT/INR - ( 07 Jan 2019 05:25 )   PT: 16.1 SEC;   INR: 1.40          PTT - ( 07 Jan 2019 05:25 )  PTT:30.6 SEC          CAPILLARY BLOOD GLUCOSE      POCT Blood Glucose.: 99 mg/dL (07 Jan 2019 05:31)  POCT Blood Glucose.: 103 mg/dL (07 Jan 2019 02:11)  POCT Blood Glucose.: 105 mg/dL (06 Jan 2019 18:30)  POCT Blood Glucose.: 104 mg/dL (06 Jan 2019 13:55)      RADIOLOGY & ADDITIONAL TESTS: Reviewed    Imaging Personally Reviewed:    Consultant(s) Notes Reviewed:      Care Discussed with Consultants/Other Providers: Brandon Zhang, PGY1  Pager [phone]    Patient is a 53y old  Female who presents with a chief complaint of cholangitis (07 Jan 2019 06:40)      SUBJECTIVE/INTERVAL EVENTS: Wolof interpretation provided by daughter per pt request. Patient seen and examined at bedside. JAILYN o/n. States feels weak. Is NPO, no n/v or abd pain.    MEDICATIONS  (STANDING):  dextrose 5%. 1000 milliLiter(s) (50 mL/Hr) IV Continuous <Continuous>  dextrose 50% Injectable 12.5 Gram(s) IV Push once  dextrose 50% Injectable 25 Gram(s) IV Push once  dextrose 50% Injectable 25 Gram(s) IV Push once  DULoxetine 60 milliGRAM(s) Oral daily  gabapentin 100 milliGRAM(s) Oral three times a day  insulin lispro (HumaLOG) corrective regimen sliding scale   SubCutaneous every 6 hours  lactated ringers. 1000 milliLiter(s) (150 mL/Hr) IV Continuous <Continuous>  piperacillin/tazobactam IVPB. 3.375 Gram(s) IV Intermittent every 8 hours    MEDICATIONS  (PRN):  acetaminophen   Tablet .. 650 milliGRAM(s) Oral every 6 hours PRN Temp greater or equal to 38C (100.4F), Mild Pain (1 - 3)  benzonatate 100 milliGRAM(s) Oral three times a day PRN Cough  dextrose 40% Gel 15 Gram(s) Oral once PRN Blood Glucose LESS THAN 70 milliGRAM(s)/deciliter  glucagon  Injectable 1 milliGRAM(s) IntraMuscular once PRN Glucose LESS THAN 70 milligrams/deciliter  ondansetron Injectable 8 milliGRAM(s) IV Push every 6 hours PRN Nausea and/or Vomiting  traMADol 50 milliGRAM(s) Oral every 8 hours PRN Severe Pain (7 - 10)      VITAL SIGNS:  T(F): 97.9 (01-07-19 @ 06:00), Max: 98.8 (01-06-19 @ 15:25)  HR: 70 (01-07-19 @ 06:00) (68 - 77)  BP: 127/72 (01-07-19 @ 06:00) (115/58 - 133/79)  RR: 18 (01-07-19 @ 06:00) (18 - 20)  SpO2: 95% (01-07-19 @ 06:00) (92% - 99%)    I&O's Summary    06 Jan 2019 07:01  -  07 Jan 2019 07:00  --------------------------------------------------------  IN: 2100 mL / OUT: 0 mL / NET: 2100 mL      Daily     Daily     PHYSICAL EXAM:  Gen: Alert, well-developed, NAD  HEENT: NCAT, EOMI, conjunctiva clear, sclera anicteric  Neck: Supple, no JVD  CV: RRR, S1S2, no m/r/g  Resp: CTAB, normal respiratory effort  Abd: Soft, nontender, nondistended, normal bowel sounds  Ext: no edema, clubbing, or cyanosis  Neuro: AOx3, no focal deficits  SKIN: No rashes or lesions    LABS:                        9.5    9.20  )-----------( 211      ( 07 Jan 2019 05:25 )             29.0     01-07    138  |  100  |  6<L>  ----------------------------<  98  3.4<L>   |  24  |  0.69    Ca    9.5      07 Jan 2019 05:25  Phos  3.2     01-07  Mg     1.8     01-07    TPro  6.5  /  Alb  2.8<L>  /  TBili  2.6<H>  /  DBili  x   /  AST  62<H>  /  ALT  50<H>  /  AlkPhos  517<H>  01-07    LIVER FUNCTIONS - ( 07 Jan 2019 05:25 )  Alb: 2.8 g/dL / Pro: 6.5 g/dL / ALK PHOS: 517 u/L / ALT: 50 u/L / AST: 62 u/L / GGT: x           PT/INR - ( 07 Jan 2019 05:25 )   PT: 16.1 SEC;   INR: 1.40          PTT - ( 07 Jan 2019 05:25 )  PTT:30.6 SEC          CAPILLARY BLOOD GLUCOSE      POCT Blood Glucose.: 99 mg/dL (07 Jan 2019 05:31)  POCT Blood Glucose.: 103 mg/dL (07 Jan 2019 02:11)  POCT Blood Glucose.: 105 mg/dL (06 Jan 2019 18:30)  POCT Blood Glucose.: 104 mg/dL (06 Jan 2019 13:55)      RADIOLOGY & ADDITIONAL TESTS: Reviewed    Imaging Personally Reviewed:    Consultant(s) Notes Reviewed:  Surgical Oncology, GI, Oncology    Care Discussed with Consultants/Other Providers:

## 2019-01-07 NOTE — PROGRESS NOTE ADULT - PROBLEM SELECTOR PLAN 2
- Pt febrile, leukocytosis, tachycardic and elevated lactate meeting sepsis requirements   - cholangitis the most likely source   - c/w Zosyn   - f/u blood cultures: currently with E.coli, f/u sensitivities   - Monitor VS - Pt febrile, leukocytosis, tachycardic and elevated lactate meeting sepsis requirements   - cholangitis the most likely source   - c/w Zosyn   - f/u blood cultures: currently with E.coli, f/u sensitivities, repeat Bcx sent today  - Monitor VS - Pt febrile, leukocytosis, tachycardic and elevated lactate meeting severe sepsis requirements   - cholangitis the most likely source   - c/w Zosyn   - f/u blood cultures: currently with E.coli, f/u sensitivities, repeat Bcx sent today  - Monitor VS

## 2019-01-07 NOTE — PROGRESS NOTE ADULT - PROBLEM SELECTOR PLAN 3
- Recent Biopsy on 12/22 shows invasive adenocarcinoma in the CBD  - ERCP performed on 12/22 also placed a biliary stent   - Has not had oncology follow up due to insurance issues   - Will consider oncology consult, but no role at this time   - Apprecaite Surg onc recs, but patient not a candidate for surgery at this time due to infection - Recent biopsy on 12/22 shows invasive adenocarcinoma in the CBD  - ERCP performed on 12/22 with placement of biliary stent   - Has not had oncology follow up due to insurance issues   - Onc: MRI abdomen/pelvis w/ contrast for characterization of liver lesion, CT Chest to complete staging  - Appreciate Surg onc recs, but patient not a candidate for surgery at this time due to infection -Likely 2/2 cholangitis  -c/w Zosyn for now  -f/u repeat blood cultures and sensitivities

## 2019-01-08 LAB
-  AMIKACIN: SIGNIFICANT CHANGE UP
-  AMPICILLIN/SULBACTAM: SIGNIFICANT CHANGE UP
-  AMPICILLIN: SIGNIFICANT CHANGE UP
-  AZTREONAM: SIGNIFICANT CHANGE UP
-  CEFAZOLIN: SIGNIFICANT CHANGE UP
-  CEFEPIME: SIGNIFICANT CHANGE UP
-  CEFOXITIN: SIGNIFICANT CHANGE UP
-  CEFTAZIDIME: SIGNIFICANT CHANGE UP
-  CEFTRIAXONE: SIGNIFICANT CHANGE UP
-  CIPROFLOXACIN: SIGNIFICANT CHANGE UP
-  ERTAPENEM: SIGNIFICANT CHANGE UP
-  GENTAMICIN: SIGNIFICANT CHANGE UP
-  IMIPENEM: SIGNIFICANT CHANGE UP
-  LEVOFLOXACIN: SIGNIFICANT CHANGE UP
-  MEROPENEM: SIGNIFICANT CHANGE UP
-  PIPERACILLIN/TAZOBACTAM: SIGNIFICANT CHANGE UP
-  TIGECYCLINE: SIGNIFICANT CHANGE UP
-  TOBRAMYCIN: SIGNIFICANT CHANGE UP
-  TRIMETHOPRIM/SULFAMETHOXAZOLE: SIGNIFICANT CHANGE UP
ALBUMIN SERPL ELPH-MCNC: 3.1 G/DL — LOW (ref 3.3–5)
ALP SERPL-CCNC: 516 U/L — HIGH (ref 40–120)
ALT FLD-CCNC: 37 U/L — HIGH (ref 4–33)
AST SERPL-CCNC: 39 U/L — HIGH (ref 4–32)
BACTERIA BLD CULT: SIGNIFICANT CHANGE UP
BASOPHILS # BLD AUTO: 0.02 K/UL — SIGNIFICANT CHANGE UP (ref 0–0.2)
BASOPHILS NFR BLD AUTO: 0.3 % — SIGNIFICANT CHANGE UP (ref 0–2)
BILIRUB SERPL-MCNC: 1.6 MG/DL — HIGH (ref 0.2–1.2)
BUN SERPL-MCNC: 6 MG/DL — LOW (ref 7–23)
CALCIUM SERPL-MCNC: 9.9 MG/DL — SIGNIFICANT CHANGE UP (ref 8.4–10.5)
CHLORIDE SERPL-SCNC: 98 MMOL/L — SIGNIFICANT CHANGE UP (ref 98–107)
CO2 SERPL-SCNC: 28 MMOL/L — SIGNIFICANT CHANGE UP (ref 22–31)
CREAT SERPL-MCNC: 0.63 MG/DL — SIGNIFICANT CHANGE UP (ref 0.5–1.3)
E COLI DNA BLD POS QL NAA+NON-PROBE: SIGNIFICANT CHANGE UP
EOSINOPHIL # BLD AUTO: 0 K/UL — SIGNIFICANT CHANGE UP (ref 0–0.5)
EOSINOPHIL NFR BLD AUTO: 0 % — SIGNIFICANT CHANGE UP (ref 0–6)
GLUCOSE SERPL-MCNC: 222 MG/DL — HIGH (ref 70–99)
HCT VFR BLD CALC: 32.1 % — LOW (ref 34.5–45)
HGB BLD-MCNC: 10.4 G/DL — LOW (ref 11.5–15.5)
IMM GRANULOCYTES NFR BLD AUTO: 0.8 % — SIGNIFICANT CHANGE UP (ref 0–1.5)
LIDOCAIN IGE QN: 34.4 U/L — SIGNIFICANT CHANGE UP (ref 7–60)
LYMPHOCYTES # BLD AUTO: 1.38 K/UL — SIGNIFICANT CHANGE UP (ref 1–3.3)
LYMPHOCYTES # BLD AUTO: 20.7 % — SIGNIFICANT CHANGE UP (ref 13–44)
MAGNESIUM SERPL-MCNC: 1.9 MG/DL — SIGNIFICANT CHANGE UP (ref 1.6–2.6)
MCHC RBC-ENTMCNC: 25 PG — LOW (ref 27–34)
MCHC RBC-ENTMCNC: 32.4 % — SIGNIFICANT CHANGE UP (ref 32–36)
MCV RBC AUTO: 77.2 FL — LOW (ref 80–100)
METHOD TYPE: SIGNIFICANT CHANGE UP
MONOCYTES # BLD AUTO: 0.22 K/UL — SIGNIFICANT CHANGE UP (ref 0–0.9)
MONOCYTES NFR BLD AUTO: 3.3 % — SIGNIFICANT CHANGE UP (ref 2–14)
NEUTROPHILS # BLD AUTO: 4.99 K/UL — SIGNIFICANT CHANGE UP (ref 1.8–7.4)
NEUTROPHILS NFR BLD AUTO: 74.9 % — SIGNIFICANT CHANGE UP (ref 43–77)
NRBC # FLD: 0 K/UL — LOW (ref 25–125)
PHOSPHATE SERPL-MCNC: 3.8 MG/DL — SIGNIFICANT CHANGE UP (ref 2.5–4.5)
PLATELET # BLD AUTO: 240 K/UL — SIGNIFICANT CHANGE UP (ref 150–400)
PMV BLD: 10.5 FL — SIGNIFICANT CHANGE UP (ref 7–13)
POTASSIUM SERPL-MCNC: 4 MMOL/L — SIGNIFICANT CHANGE UP (ref 3.5–5.3)
POTASSIUM SERPL-SCNC: 4 MMOL/L — SIGNIFICANT CHANGE UP (ref 3.5–5.3)
PROT SERPL-MCNC: 6.9 G/DL — SIGNIFICANT CHANGE UP (ref 6–8.3)
RBC # BLD: 4.16 M/UL — SIGNIFICANT CHANGE UP (ref 3.8–5.2)
RBC # FLD: 15 % — HIGH (ref 10.3–14.5)
SODIUM SERPL-SCNC: 138 MMOL/L — SIGNIFICANT CHANGE UP (ref 135–145)
SPECIMEN SOURCE: SIGNIFICANT CHANGE UP
SPECIMEN SOURCE: SIGNIFICANT CHANGE UP
WBC # BLD: 6.66 K/UL — SIGNIFICANT CHANGE UP (ref 3.8–10.5)
WBC # FLD AUTO: 6.66 K/UL — SIGNIFICANT CHANGE UP (ref 3.8–10.5)

## 2019-01-08 PROCEDURE — 74182 MRI ABDOMEN W/CONTRAST: CPT | Mod: 26

## 2019-01-08 PROCEDURE — 99233 SBSQ HOSP IP/OBS HIGH 50: CPT | Mod: GC

## 2019-01-08 PROCEDURE — 99232 SBSQ HOSP IP/OBS MODERATE 35: CPT | Mod: GC

## 2019-01-08 PROCEDURE — 93010 ELECTROCARDIOGRAM REPORT: CPT

## 2019-01-08 PROCEDURE — 99232 SBSQ HOSP IP/OBS MODERATE 35: CPT

## 2019-01-08 RX ORDER — POLYETHYLENE GLYCOL 3350 17 G/17G
17 POWDER, FOR SOLUTION ORAL DAILY
Qty: 0 | Refills: 0 | Status: DISCONTINUED | OUTPATIENT
Start: 2019-01-08 | End: 2019-01-11

## 2019-01-08 RX ORDER — SENNA PLUS 8.6 MG/1
2 TABLET ORAL AT BEDTIME
Qty: 0 | Refills: 0 | Status: DISCONTINUED | OUTPATIENT
Start: 2019-01-08 | End: 2019-01-11

## 2019-01-08 RX ORDER — ENOXAPARIN SODIUM 100 MG/ML
40 INJECTION SUBCUTANEOUS DAILY
Qty: 0 | Refills: 0 | Status: DISCONTINUED | OUTPATIENT
Start: 2019-01-08 | End: 2019-01-11

## 2019-01-08 RX ADMIN — Medication 1: at 12:26

## 2019-01-08 RX ADMIN — DULOXETINE HYDROCHLORIDE 60 MILLIGRAM(S): 30 CAPSULE, DELAYED RELEASE ORAL at 12:27

## 2019-01-08 RX ADMIN — Medication 1: at 22:10

## 2019-01-08 RX ADMIN — PIPERACILLIN AND TAZOBACTAM 25 GRAM(S): 4; .5 INJECTION, POWDER, LYOPHILIZED, FOR SOLUTION INTRAVENOUS at 12:26

## 2019-01-08 RX ADMIN — PIPERACILLIN AND TAZOBACTAM 25 GRAM(S): 4; .5 INJECTION, POWDER, LYOPHILIZED, FOR SOLUTION INTRAVENOUS at 03:42

## 2019-01-08 RX ADMIN — GABAPENTIN 100 MILLIGRAM(S): 400 CAPSULE ORAL at 13:07

## 2019-01-08 RX ADMIN — ENOXAPARIN SODIUM 40 MILLIGRAM(S): 100 INJECTION SUBCUTANEOUS at 12:26

## 2019-01-08 RX ADMIN — SENNA PLUS 2 TABLET(S): 8.6 TABLET ORAL at 22:11

## 2019-01-08 RX ADMIN — Medication 1: at 10:13

## 2019-01-08 RX ADMIN — POLYETHYLENE GLYCOL 3350 17 GRAM(S): 17 POWDER, FOR SOLUTION ORAL at 12:26

## 2019-01-08 RX ADMIN — PIPERACILLIN AND TAZOBACTAM 25 GRAM(S): 4; .5 INJECTION, POWDER, LYOPHILIZED, FOR SOLUTION INTRAVENOUS at 20:47

## 2019-01-08 RX ADMIN — GABAPENTIN 100 MILLIGRAM(S): 400 CAPSULE ORAL at 05:28

## 2019-01-08 RX ADMIN — GABAPENTIN 100 MILLIGRAM(S): 400 CAPSULE ORAL at 22:11

## 2019-01-08 NOTE — PROGRESS NOTE ADULT - ASSESSMENT
53 year old female with invasive ampullary adenocarcinoma (s/p ERCP at OSH with a CBD stent placement), DM2, HTN, OA and asthma presenting to the ED with a chief complaint of abdominal pain.    Impression  - Cholangitis in the setting of occlusion of previously placed stent, now s/p ERCP on 1/7/2018 with placement of a new plastic stent into the common bile duct, now improved significantly   - Invasive ampullary adenocarcinoma s/p ERCP at OSH with a CBD stent placement    Recommendations    - trend CBC, CMP  - continue IV hydration  - continue antibiotics, broad spectrum for 5-7 days   - plan for surgery for resection of the ampullary adenocarcinoma  - advance diet as tolerated   - supportive care as per primary team  - patient can follow with Dr Tuttle as outpatient on discharge ([phone])      Joann Go, PGY-5  GI fellow  B- [phone]/ [phone]  Please call GI fellow on call after 5pm and on weekends

## 2019-01-08 NOTE — PROGRESS NOTE ADULT - ATTENDING COMMENTS
Patient seen and examined at bedside on rounds with ARUNA Toure interpretation provided by daughter per request. Had an episode overnight where she felt dizzy and was seeing spots. Suspect likely vasovagal in nature post anesthesia and NPO status. Orthostatics were unremarkable. This AM, reports resolution of these symptoms. Tolerating clears and wishes for full diet. Will f/u with GI re: advancing diet. Does endorse some increased RUQ pain, no associated nausea, vomiting, epigastric pain, or pain radiating to the back. LFTs have downtrended after stent replacement yesterday, continue to trend daily. If pain worsens, will need to check lipase and assess for post ERCP pancreatitis. c/w Zosyn for E. coli bacteremia. Cultures have now cleared.    CT chest and MRI liver have returned and are negative for any metastatic disease. Spoke with Dr. Bourne of surgical-oncology, plan for OR on Friday for surgical resection of cholangiocarcinoma provided repeat cultures remain negative and no change in clinical status. Will obtain EKG today for further cardiac risk stratification pre-operatively. Remaining care as noted above.

## 2019-01-08 NOTE — PROGRESS NOTE ADULT - ATTENDING COMMENTS
Patient seen and examined with GI fellow. I agree with above. Pt improved post ERCP.  Surgery planned for the ampullary mass.

## 2019-01-08 NOTE — PROGRESS NOTE ADULT - SUBJECTIVE AND OBJECTIVE BOX
INTERVAL HPI/OVERNIGHT EVENTS:  Patient S&E at bedside with her daughter. she is bangali speaking only. her daughter said they have an appt with Dr batista      VITAL SIGNS:  T(F): 97.6 (01-09-19 @ 05:34)  HR: 59 (01-09-19 @ 05:34)  BP: 129/82 (01-09-19 @ 05:34)  RR: 17 (01-09-19 @ 05:34)  SpO2: 99% (01-09-19 @ 05:34)  Wt(kg): --    PHYSICAL EXAM:    Constitutional: NAD  Eyes: EOMI, sclera non-icteric  Neck: supple, no masses, no JVD  Respiratory: CTA b/l, good air entry b/l  Cardiovascular: RRR, no M/R/G  Gastrointestinal: soft, NTND, no masses palpable, + BS, no hepatosplenomegaly  Extremities: no c/c/e  Neurological: AAOx3      MEDICATIONS  (STANDING):  dextrose 5%. 1000 milliLiter(s) (50 mL/Hr) IV Continuous <Continuous>  dextrose 50% Injectable 12.5 Gram(s) IV Push once  dextrose 50% Injectable 25 Gram(s) IV Push once  dextrose 50% Injectable 25 Gram(s) IV Push once  DULoxetine 60 milliGRAM(s) Oral daily  enoxaparin Injectable 40 milliGRAM(s) SubCutaneous daily  gabapentin 100 milliGRAM(s) Oral three times a day  insulin lispro (HumaLOG) corrective regimen sliding scale   SubCutaneous Before meals and at bedtime  piperacillin/tazobactam IVPB. 3.375 Gram(s) IV Intermittent every 8 hours  polyethylene glycol 3350 17 Gram(s) Oral daily  senna 2 Tablet(s) Oral at bedtime    MEDICATIONS  (PRN):  acetaminophen   Tablet .. 650 milliGRAM(s) Oral every 6 hours PRN Temp greater or equal to 38C (100.4F), Mild Pain (1 - 3)  benzocaine 15 mG/menthol 3.6 mG Lozenge 1 Lozenge Oral every 4 hours PRN Sore Throat  benzonatate 100 milliGRAM(s) Oral three times a day PRN Cough  dextrose 40% Gel 15 Gram(s) Oral once PRN Blood Glucose LESS THAN 70 milliGRAM(s)/deciliter  glucagon  Injectable 1 milliGRAM(s) IntraMuscular once PRN Glucose LESS THAN 70 milligrams/deciliter  ondansetron Injectable 8 milliGRAM(s) IV Push every 6 hours PRN Nausea and/or Vomiting  traMADol 50 milliGRAM(s) Oral every 8 hours PRN Severe Pain (7 - 10)      Allergies    No Known Allergies    Intolerances        LABS:                        10.4   6.66  )-----------( 240      ( 08 Jan 2019 05:45 )             32.1     01-08    138  |  98  |  6<L>  ----------------------------<  222<H>  4.0   |  28  |  0.63    Ca    9.9      08 Jan 2019 05:45  Phos  3.8     01-08  Mg     1.9     01-08    TPro  6.9  /  Alb  3.1<L>  /  TBili  1.6<H>  /  DBili  x   /  AST  39<H>  /  ALT  37<H>  /  AlkPhos  516<H>  01-08        < from: CT Chest w/ IV Cont (01.07.19 @ 21:20) >  IMPRESSION: Small bilateral pleural effusions with bibasilar atelectasis.    No lung nodules.    Mildly enlarged right cardiophrenic angle lymph node is unchanged since   December 18, 2018 is indeterminate.    < end of copied text >    < from: MR Abdomen w/ IV Cont (01.08.19 @ 10:17) >  MRI of the abdomen was performed with and without intravenous contrast.  6 cc of Gadavist were administered. 1.5 cc were discarded.  MRCP was performed.    FINDINGS:    LOWER CHEST: Bilateral small pleural effusions.    LIVER: There are no suspicious liver lesions. No corresponding   abnormality is identified to the questionable lesion identified on CT   scan 1/5/2019.  BILE DUCTS: Intra-and extrahepatic biliary ductal dilation. The CBD   measures 1.2 cm, previously 1.9 cm. Redemonstration of biliary ductal   mural enhancement. Pneumobilia.  GALLBLADDER: Cholelithiasis with gallbladder wall thickening measuring up   to 6 mm.  SPLEEN: Within normal limits.  PANCREAS: Mild dilatation of the pancreatic duct at the level of the head   of the pancreas up to 5 mm. Small T2 hyperintense lesions in the body and   tail of the pancreas the largest measuring up to 5 mm, likely low-grade   cystic pancreatic neoplasm such as IPMN.    ADRENALS: Within normal limits.  KIDNEYS/URETERS: Patchy right renal enhancement visualized on recent CT   is not clearly seen on this study, noting motion degraded coronal   postcontrast sequence.    VISUALIZED PORTIONS:    BOWEL: Within normal limits.   PERITONEUM: No ascites.  VESSELS: Within normal limits. The portal veins are patent.  RETROPERITONEUM: Prominent subcentimeter retroperitoneal lymph nodes.    ABDOMINAL WALL: Ventral abdominal wall nodules visualized on recent CT   are below the field of view of this MRI.    IMPRESSION:   *  No suspicious liver lesions. No abnormality is identified to   correspond with questionable lesion identified on prior CT.  *  Intra and extrahepatic biliary ductal dilation, with mural enhancement   which may be seen in the setting of cholangitis.  *  Cholelithiasis with gallbladder wall thickening.      < end of copied text >    RADIOLOGY & ADDITIONAL TESTS:  Studies reviewed. INTERVAL HPI/OVERNIGHT EVENTS:  Patient S&E at bedside with her daughter. she is bangali speaking only. her daughter said they have an appt with Dr batista      VITAL SIGNS:  T(F): 97.6 (01-09-19 @ 05:34)  HR: 59 (01-09-19 @ 05:34)  BP: 129/82 (01-09-19 @ 05:34)  RR: 17 (01-09-19 @ 05:34)  SpO2: 99% (01-09-19 @ 05:34)  Wt(kg): --    PHYSICAL EXAM:    Constitutional: NAD  Eyes: EOMI, sclera non-icteric  Neck: supple, no masses, no JVD  Respiratory: CTA b/l, good air entry b/l  Cardiovascular: RRR, no M/R/G  Gastrointestinal: soft, NTND, no masses palpable, + BS, no hepatosplenomegaly  Extremities: no c/c/e  Neurological: AAOx3      MEDICATIONS  (STANDING):  dextrose 5%. 1000 milliLiter(s) (50 mL/Hr) IV Continuous <Continuous>  dextrose 50% Injectable 12.5 Gram(s) IV Push once  dextrose 50% Injectable 25 Gram(s) IV Push once  dextrose 50% Injectable 25 Gram(s) IV Push once  DULoxetine 60 milliGRAM(s) Oral daily  enoxaparin Injectable 40 milliGRAM(s) SubCutaneous daily  gabapentin 100 milliGRAM(s) Oral three times a day  insulin lispro (HumaLOG) corrective regimen sliding scale   SubCutaneous Before meals and at bedtime  piperacillin/tazobactam IVPB. 3.375 Gram(s) IV Intermittent every 8 hours  polyethylene glycol 3350 17 Gram(s) Oral daily  senna 2 Tablet(s) Oral at bedtime    MEDICATIONS  (PRN):  acetaminophen   Tablet .. 650 milliGRAM(s) Oral every 6 hours PRN Temp greater or equal to 38C (100.4F), Mild Pain (1 - 3)  benzocaine 15 mG/menthol 3.6 mG Lozenge 1 Lozenge Oral every 4 hours PRN Sore Throat  benzonatate 100 milliGRAM(s) Oral three times a day PRN Cough  dextrose 40% Gel 15 Gram(s) Oral once PRN Blood Glucose LESS THAN 70 milliGRAM(s)/deciliter  glucagon  Injectable 1 milliGRAM(s) IntraMuscular once PRN Glucose LESS THAN 70 milligrams/deciliter  ondansetron Injectable 8 milliGRAM(s) IV Push every 6 hours PRN Nausea and/or Vomiting  traMADol 50 milliGRAM(s) Oral every 8 hours PRN Severe Pain (7 - 10)      Allergies    No Known Allergies    Intolerances        LABS:                        10.4   6.66  )-----------( 240      ( 08 Jan 2019 05:45 )             32.1     01-08    138  |  98  |  6<L>  ----------------------------<  222<H>  4.0   |  28  |  0.63    Ca    9.9      08 Jan 2019 05:45  Phos  3.8     01-08  Mg     1.9     01-08    TPro  6.9  /  Alb  3.1<L>  /  TBili  1.6<H>  /  DBili  x   /  AST  39<H>  /  ALT  37<H>  /  AlkPhos  516<H>  01-08        < from: CT Chest w/ IV Cont (01.07.19 @ 21:20) >  IMPRESSION: Small bilateral pleural effusions with bibasilar atelectasis.    No lung nodules.    Mildly enlarged right cardiophrenic angle lymph node is unchanged since   December 18, 2018 is indeterminate.    < end of copied text >    < from: MR Abdomen w/ IV Cont (01.08.19 @ 10:17) >  MRI of the abdomen was performed with and without intravenous contrast.  6 cc of Gadavist were administered. 1.5 cc were discarded.  MRCP was performed.    FINDINGS:    LOWER CHEST: Bilateral small pleural effusions.    LIVER: There are no suspicious liver lesions. No corresponding   abnormality is identified to the questionable lesion identified on CT   scan 1/5/2019.  BILE DUCTS: Intra-and extrahepatic biliary ductal dilation. The CBD   measures 1.2 cm, previously 1.9 cm. Redemonstration of biliary ductal   mural enhancement. Pneumobilia.  GALLBLADDER: Cholelithiasis with gallbladder wall thickening measuring up   to 6 mm.  SPLEEN: Within normal limits.  PANCREAS: Mild dilatation of the pancreatic duct at the level of the head   of the pancreas up to 5 mm. Small T2 hyperintense lesions in the body and   tail of the pancreas the largest measuring up to 5 mm, likely low-grade   cystic pancreatic neoplasm such as IPMN.    ADRENALS: Within normal limits.  KIDNEYS/URETERS: Patchy right renal enhancement visualized on recent CT   is not clearly seen on this study, noting motion degraded coronal   postcontrast sequence.    VISUALIZED PORTIONS:    BOWEL: Within normal limits.   PERITONEUM: No ascites.  VESSELS: Within normal limits. The portal veins are patent.  RETROPERITONEUM: Prominent subcentimeter retroperitoneal lymph nodes.    ABDOMINAL WALL: Ventral abdominal wall nodules visualized on recent CT   are below the field of view of this MRI.    IMPRESSION:   *  No suspicious liver lesions. No abnormality is identified to   correspond with questionable lesion identified on prior CT.  *  Intra and extrahepatic biliary ductal dilation, with mural enhancement   which may be seen in the setting of cholangitis.  *  Cholelithiasis with gallbladder wall thickening.      < end of copied text >    RADIOLOGY & ADDITIONAL TESTS:  Studies reviewed.    Surgical Pathology Report (12.22.18 @ 21:12)    Surgical Pathology Report:   ACCESSION No:  [id number]    NOEMI SOUSA                    1        Surgical Final Report          Final Diagnosis    Ampullary mass; biopsy:  - Invasive ampullary adenocarcinoma, superficial fragments.  See  comment.  - Foci consistent with lymphovascular invasion are identified.  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    Verified by: Teri Dodge MD  (Electronic Signature)  Reported on: 12/26/18 16:42 EST, [address] [place] [id number]  _________________________________________________________________    Comment  Adenocarcinoma invades at least muscularis mucosae.  Dr. BERT Sousa was notified.    Clinical History  Cholangitis  Operation/procedure; ERCP ampullary biopsy    Specimen(s) Submitted  1     Ampullary mass    Gross Description INTERVAL HPI/OVERNIGHT EVENTS:  Patient S&E at bedside with her daughter. she is bangali speaking only. her daughter said they have an appt with Dr batista      VITAL SIGNS:  T(F): 97.6 (01-09-19 @ 05:34)  HR: 59 (01-09-19 @ 05:34)  BP: 129/82 (01-09-19 @ 05:34)  RR: 17 (01-09-19 @ 05:34)  SpO2: 99% (01-09-19 @ 05:34)  Wt(kg): --    PHYSICAL EXAM:    Constitutional: NAD  Eyes: EOMI, sclera non-icteric  Neck: supple, no masses, no JVD  Respiratory: CTA b/l, good air entry b/l  Cardiovascular: RRR, no M/R/G  Gastrointestinal: soft, NTND, no masses palpable, + BS, no hepatosplenomegaly  Extremities: no c/c/e  Neurological: AAOx3      MEDICATIONS  (STANDING):  dextrose 5%. 1000 milliLiter(s) (50 mL/Hr) IV Continuous <Continuous>  dextrose 50% Injectable 12.5 Gram(s) IV Push once  dextrose 50% Injectable 25 Gram(s) IV Push once  dextrose 50% Injectable 25 Gram(s) IV Push once  DULoxetine 60 milliGRAM(s) Oral daily  enoxaparin Injectable 40 milliGRAM(s) SubCutaneous daily  gabapentin 100 milliGRAM(s) Oral three times a day  insulin lispro (HumaLOG) corrective regimen sliding scale   SubCutaneous Before meals and at bedtime  piperacillin/tazobactam IVPB. 3.375 Gram(s) IV Intermittent every 8 hours  polyethylene glycol 3350 17 Gram(s) Oral daily  senna 2 Tablet(s) Oral at bedtime    MEDICATIONS  (PRN):  acetaminophen   Tablet .. 650 milliGRAM(s) Oral every 6 hours PRN Temp greater or equal to 38C (100.4F), Mild Pain (1 - 3)  benzocaine 15 mG/menthol 3.6 mG Lozenge 1 Lozenge Oral every 4 hours PRN Sore Throat  benzonatate 100 milliGRAM(s) Oral three times a day PRN Cough  dextrose 40% Gel 15 Gram(s) Oral once PRN Blood Glucose LESS THAN 70 milliGRAM(s)/deciliter  glucagon  Injectable 1 milliGRAM(s) IntraMuscular once PRN Glucose LESS THAN 70 milligrams/deciliter  ondansetron Injectable 8 milliGRAM(s) IV Push every 6 hours PRN Nausea and/or Vomiting  traMADol 50 milliGRAM(s) Oral every 8 hours PRN Severe Pain (7 - 10)      Allergies    No Known Allergies    Intolerances        LABS:                        10.4   6.66  )-----------( 240      ( 08 Jan 2019 05:45 )             32.1     01-08    138  |  98  |  6<L>  ----------------------------<  222<H>  4.0   |  28  |  0.63    Ca    9.9      08 Jan 2019 05:45  Phos  3.8     01-08  Mg     1.9     01-08    TPro  6.9  /  Alb  3.1<L>  /  TBili  1.6<H>  /  DBili  x   /  AST  39<H>  /  ALT  37<H>  /  AlkPhos  516<H>  01-08        < from: CT Chest w/ IV Cont (01.07.19 @ 21:20) >  IMPRESSION: Small bilateral pleural effusions with bibasilar atelectasis.    No lung nodules.    Mildly enlarged right cardiophrenic angle lymph node is unchanged since   December 18, 2018 is indeterminate.    < end of copied text >    < from: MR Abdomen w/ IV Cont (01.08.19 @ 10:17) >  MRI of the abdomen was performed with and without intravenous contrast.  6 cc of Gadavist were administered. 1.5 cc were discarded.  MRCP was performed.    FINDINGS:    LOWER CHEST: Bilateral small pleural effusions.    LIVER: There are no suspicious liver lesions. No corresponding   abnormality is identified to the questionable lesion identified on CT   scan 1/5/2019.  BILE DUCTS: Intra-and extrahepatic biliary ductal dilation. The CBD   measures 1.2 cm, previously 1.9 cm. Redemonstration of biliary ductal   mural enhancement. Pneumobilia.  GALLBLADDER: Cholelithiasis with gallbladder wall thickening measuring up   to 6 mm.  SPLEEN: Within normal limits.  PANCREAS: Mild dilatation of the pancreatic duct at the level of the head   of the pancreas up to 5 mm. Small T2 hyperintense lesions in the body and   tail of the pancreas the largest measuring up to 5 mm, likely low-grade   cystic pancreatic neoplasm such as IPMN.    ADRENALS: Within normal limits.  KIDNEYS/URETERS: Patchy right renal enhancement visualized on recent CT   is not clearly seen on this study, noting motion degraded coronal   postcontrast sequence.    VISUALIZED PORTIONS:    BOWEL: Within normal limits.   PERITONEUM: No ascites.  VESSELS: Within normal limits. The portal veins are patent.  RETROPERITONEUM: Prominent subcentimeter retroperitoneal lymph nodes.    ABDOMINAL WALL: Ventral abdominal wall nodules visualized on recent CT   are below the field of view of this MRI.    IMPRESSION:   *  No suspicious liver lesions. No abnormality is identified to   correspond with questionable lesion identified on prior CT.  *  Intra and extrahepatic biliary ductal dilation, with mural enhancement   which may be seen in the setting of cholangitis.  *  Cholelithiasis with gallbladder wall thickening.      < end of copied text >    RADIOLOGY & ADDITIONAL TESTS:  Studies reviewed.    Surgical Pathology Report (12.22.18 @ 21:12)    Surgical Pathology Report:   ACCESSION No:  [id number]        Surgical Final Report    Final Diagnosis    Ampullary mass; biopsy:  - Invasive ampullary adenocarcinoma, superficial fragments.  See  comment.  - Foci consistent with lymphovascular invasion are identified.  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    Verified by: Teri Dodge MD  (Electronic Signature)  Reported on: 12/26/18 16:42 EST[address] [address]  _________________________________________________________________    Comment  Adenocarcinoma invades at least muscularis mucosae.  Dr. BERT Lagunas was notified.    Clinical History  Cholangitis  Operation/procedure; ERCP ampullary biopsy    Specimen(s) Submitted  1     Ampullary mass    < from: CT Abdomen and Pelvis w/ IV Cont (01.05.19 @ 06:42) >  EXAM:  CT ABDOMEN AND PELVIS IC        PROCEDURE DATE:  Jan 5 2019         INTERPRETATION:  CLINICAL INFORMATION: Fever, vomiting and abdominal   pain. Status post biopsy of ampullary mass on 12/24/2018 demonstrating   invasive ampullary adenocarcinoma.    COMPARISON: MRI abdomen 12/21/2018.    PROCEDURE:   CT of the Abdomen and Pelvis was performed with intravenous contrast.   Intravenous contrast: 90 ml Omnipaque 350. 10 ml discarded.  Oral contrast: None.  Sagittal and coronal reformats were performed.    FINDINGS:    LOWER CHEST: Subsegmental atelectasis in the lower lobes. Cardiophrenic   node measuring 1.5 x 1.1 cm (series 2 image 8).    LIVER: Normal morphology. Heterogeneous enhancement in the right lobe   with questionable hypodensity in segment 7 measuring 1.3 cm (series 2   image 20).  BILE DUCTS: Intrahepatic and extrahepatic biliary ductal dilatation; the   common bile duct measures up to 1.9 cm wall thickening and   hyperattenuation. The biliary duct catheter pigtails arein the distal   common bile duct and the small bowel at the level of the duodenal jejunal   flexure. Mild pneumobilia likely secondary to recent ERCP.  GALLBLADDER: Gallbladder wall edema cholelithiasis.  SPLEEN: Within normal limits.  PANCREAS: The main pancreatic duct measures up to 0.6 cm in the   pancreatic head, similar in appearance to the prior exam 12/21/2018.  ADRENALS: Within normal limits.  KIDNEYS/URETERS: Heterogeneous patchy hypoenhancement involving the right   midpole (series 2 image 31 and series 602 image 64). No hydronephrosis.    BLADDER: Within normal limits.  REPRODUCTIVE ORGANS: Unremarkable CT appearance of the uterus and adnexa.    BOWEL: No bowel obstruction. Appendix is normal.  PERITONEUM: No ascites or pneumoperitoneum.  VESSELS:  Normal caliber abdominal aorta.  RETROPERITONEUM: Prominent retroperitoneal nodes, a reference precarinal   node measures 1.0 cm (series 2 image 33).    ABDOMINAL WALL: Multiple subcutaneous nodules within the ventral abdomen.   For example, there is a 1.6 cm nodule in the left ventral abdomen (2, 76)   and a 1.6 cm nodule in the right ventral abdomen (2, 79), likely related   to injection granulomas; correlate clinically.  BONES: Mild degenerative changes of the spine.    IMPRESSION:     Intra and extrahepatic biliary ductal dilatation is again noted with   pneumobilia likely related to recent ERCP. Hyperattenuation and mild wall   thickening involving the CBD raises the possibility of cholangitis;   correlate with laboratory values. CBD stent located in the distal CBD and   duodenum.    Heterogeneity of the right hepatic lobe with questionable 1.3 cm   hypoattenuating lesion in segment 7.    Heterogeneous patchy hypoenhancement involving the right kidney;   correlate clinically for pyelonephritis.    < end of copied text >

## 2019-01-08 NOTE — PROGRESS NOTE ADULT - PROBLEM SELECTOR PLAN 3
-Likely 2/2 cholangitis  -c/w Zosyn for now  -f/u repeat blood cultures and sensitivities -Likely 2/2 cholangitis  -c/w Zosyn  - f/u repeat blood cultures and sensitivities -Likely 2/2 cholangitis  -c/w Zosyn, sensitivty confirmed  - f/u repeat blood cultures NGTD

## 2019-01-08 NOTE — PROGRESS NOTE ADULT - SUBJECTIVE AND OBJECTIVE BOX
Chief Complaint:  Patient is a 53y old  Female who presents with a chief complaint of cholangitis (08 Jan 2019 11:02)      Interval Events:     Allergies:  No Known Allergies      Hospital Medications:  acetaminophen   Tablet .. 650 milliGRAM(s) Oral every 6 hours PRN  benzocaine 15 mG/menthol 3.6 mG Lozenge 1 Lozenge Oral every 4 hours PRN  benzonatate 100 milliGRAM(s) Oral three times a day PRN  dextrose 40% Gel 15 Gram(s) Oral once PRN  dextrose 5%. 1000 milliLiter(s) IV Continuous <Continuous>  dextrose 50% Injectable 12.5 Gram(s) IV Push once  dextrose 50% Injectable 25 Gram(s) IV Push once  dextrose 50% Injectable 25 Gram(s) IV Push once  DULoxetine 60 milliGRAM(s) Oral daily  enoxaparin Injectable 40 milliGRAM(s) SubCutaneous daily  gabapentin 100 milliGRAM(s) Oral three times a day  glucagon  Injectable 1 milliGRAM(s) IntraMuscular once PRN  insulin lispro (HumaLOG) corrective regimen sliding scale   SubCutaneous Before meals and at bedtime  ondansetron Injectable 8 milliGRAM(s) IV Push every 6 hours PRN  piperacillin/tazobactam IVPB. 3.375 Gram(s) IV Intermittent every 8 hours  polyethylene glycol 3350 17 Gram(s) Oral daily  senna 2 Tablet(s) Oral at bedtime  traMADol 50 milliGRAM(s) Oral every 8 hours PRN      PMHX/PSHX:  Adenocarcinoma of gallbladder  Type 2 diabetes mellitus without complication, without long-term current use of insulin  Neuropathy  Arthritis  Mild intermittent asthma without complication  Gastroesophageal reflux disease without esophagitis  Essential hypertension  No pertinent past medical history  No significant past surgical history      Family history:  No pertinent family history in first degree relatives      ROS:     General:  No wt loss, fevers, chills, night sweats, fatigue,   Eyes:  Good vision, no reported pain  ENT:  No sore throat, pain, runny nose, dysphagia  CV:  No pain, palpitations, hypo/hypertension  Resp:  No dyspnea, cough, tachypnea, wheezing  GI:  See HPI  :  No pain, bleeding, incontinence, nocturia  Muscle:  No pain, weakness  Neuro:  No weakness, tingling, memory problems  Psych:  No fatigue, insomnia, mood problems, depression  Endocrine:  No polyuria, polydipsia, cold/heat intolerance  Heme:  No petechiae, ecchymosis, easy bruisability  Skin:  No rash, edema      PHYSICAL EXAM:     GENERAL:  Appears stated age, well-groomed, well-nourished, no distress  HEENT:  NC/AT,  conjunctivae clear, sclera -anicteric  CHEST:  Full & symmetric excursion, no increased effort, breath sounds clear  HEART:  Regular rhythm, S1, S2, no murmur/rub/S3/S4,  no edema  ABDOMEN:  Soft, non-tender, non-distended, normoactive bowel sounds,  no masses ,no hepato-splenomegaly,   EXTREMITIES:  no cyanosis,clubbing or edema  SKIN:  No rash/erythema/ecchymoses/petechiae/wounds/abscess/warm/dry  NEURO:  Alert, oriented    Vital Signs:  Vital Signs Last 24 Hrs  T(C): 37.1 (08 Jan 2019 15:35), Max: 37.1 (08 Jan 2019 15:35)  T(F): 98.7 (08 Jan 2019 15:35), Max: 98.7 (08 Jan 2019 15:35)  HR: 63 (08 Jan 2019 15:35) (58 - 76)  BP: 107/55 (08 Jan 2019 15:35) (107/55 - 135/77)  BP(mean): --  RR: 18 (08 Jan 2019 15:35) (18 - 18)  SpO2: 96% (08 Jan 2019 15:35) (96% - 100%)  Daily     Daily     LABS:                        10.4   6.66  )-----------( 240      ( 08 Jan 2019 05:45 )             32.1     01-08    138  |  98  |  6<L>  ----------------------------<  222<H>  4.0   |  28  |  0.63    Ca    9.9      08 Jan 2019 05:45  Phos  3.8     01-08  Mg     1.9     01-08    TPro  6.9  /  Alb  3.1<L>  /  TBili  1.6<H>  /  DBili  x   /  AST  39<H>  /  ALT  37<H>  /  AlkPhos  516<H>  01-08    LIVER FUNCTIONS - ( 08 Jan 2019 05:45 )  Alb: 3.1 g/dL / Pro: 6.9 g/dL / ALK PHOS: 516 u/L / ALT: 37 u/L / AST: 39 u/L / GGT: x           PT/INR - ( 07 Jan 2019 05:25 )   PT: 16.1 SEC;   INR: 1.40          PTT - ( 07 Jan 2019 05:25 )  PTT:30.6 SEC        Imaging: Chief Complaint:  Patient is a 53y old  Female who presents with a chief complaint of cholangitis (08 Jan 2019 11:02)      Interval Events:   - patient underwent an ERCP yesterday and tolerated the procedure with no issues  - denies abdominal pain, nausea or vomiting.     From consult note: HPI: 52 y/o Female w/ a pmh significant for DM2, HTN, OA and asthma presenting to the ED w/ a chief complaint of abdominal pain. Pt recently discharged from [facility] (12/18-12/24), at that time was admitted for dizziness and fall, found to have elevated liver enzymes (tbili 2.3, ALP in the 500s) and CBD dilatation up to 12mm and cholelithiasis on US, MRCP showing dilated common bile duct with an abrupt transition at the very distal common bile duct, concerning for bile duct stone or cholangiocarcinoma, s/p ERCP with plastic stent placement and biopsy confirming invasive ampullary adenocarcinoma. Pt was discharged home and was told to follow with surgical oncology for management. She has some chronic abdominal upper quadrant pain since prior to the last hospitalization at [facility]. Pt presented to the ED today with acute onset RUQ and RLQ abdominal pain x1 day that is constant and crampy, followed by 2 episodes of NBNB emesis and decreased PO intake. Pt also has been having intermittent fevers for the past few days. In the ED, patient was found be febrile to 101.5, hypotensive initially to 80s/40s, labs notable for WBC of 15.2, , Tbili 2.2, AST//60. Pt s/p Vanc and zosyn. Due to insurance issues, they have not had follow-up with oncology or surgical oncology yet.  Repeat CT showed intra and extrahepatic biliary ductal dilatation with pneumobilia likely related to recent ERCP, also hyperattenuation and mild wall thickening involving CBD raises possibility of cholangitis, CBD (pigtail) stent located in distal CBD and duodenum. Currently patient feels better and has no further fevers. She has the chronic abdominal pain which is present even prior to the last admission and is unchanged.      Allergies:  No Known Allergies      Hospital Medications:  acetaminophen   Tablet .. 650 milliGRAM(s) Oral every 6 hours PRN  benzocaine 15 mG/menthol 3.6 mG Lozenge 1 Lozenge Oral every 4 hours PRN  benzonatate 100 milliGRAM(s) Oral three times a day PRN  dextrose 40% Gel 15 Gram(s) Oral once PRN  dextrose 5%. 1000 milliLiter(s) IV Continuous <Continuous>  dextrose 50% Injectable 12.5 Gram(s) IV Push once  dextrose 50% Injectable 25 Gram(s) IV Push once  dextrose 50% Injectable 25 Gram(s) IV Push once  DULoxetine 60 milliGRAM(s) Oral daily  enoxaparin Injectable 40 milliGRAM(s) SubCutaneous daily  gabapentin 100 milliGRAM(s) Oral three times a day  glucagon  Injectable 1 milliGRAM(s) IntraMuscular once PRN  insulin lispro (HumaLOG) corrective regimen sliding scale   SubCutaneous Before meals and at bedtime  ondansetron Injectable 8 milliGRAM(s) IV Push every 6 hours PRN  piperacillin/tazobactam IVPB. 3.375 Gram(s) IV Intermittent every 8 hours  polyethylene glycol 3350 17 Gram(s) Oral daily  senna 2 Tablet(s) Oral at bedtime  traMADol 50 milliGRAM(s) Oral every 8 hours PRN      PMHX/PSHX:  Adenocarcinoma of gallbladder  Type 2 diabetes mellitus without complication, without long-term current use of insulin  Neuropathy  Arthritis  Mild intermittent asthma without complication  Gastroesophageal reflux disease without esophagitis  Essential hypertension  No pertinent past medical history  No significant past surgical history      Family history:  No pertinent family history in first degree relatives      ROS:     General:  No wt loss, fevers, chills, night sweats, fatigue,   Eyes:  Good vision, no reported pain  ENT:  No sore throat, pain, runny nose, dysphagia  CV:  No pain, palpitations, hypo/hypertension  Resp:  No dyspnea, cough, tachypnea, wheezing  GI:  See HPI  :  No pain, bleeding, incontinence, nocturia  Muscle:  No pain, weakness  Neuro:  No weakness, tingling, memory problems  Psych:  No fatigue, insomnia, mood problems, depression  Endocrine:  No polyuria, polydipsia, cold/heat intolerance  Heme:  No petechiae, ecchymosis, easy bruisability  Skin:  No rash, edema      PHYSICAL EXAM:     GENERAL:  Appears stated age, well-groomed, well-nourished, no distress  HEENT:  NC/AT,  conjunctivae clear, sclera -anicteric  CHEST:  Full & symmetric excursion, no increased effort, breath sounds clear  HEART:  Regular rhythm, S1, S2, no murmur/rub/S3/S4,  no edema  ABDOMEN:  Soft, non-tender, non-distended, normoactive bowel sounds,  no masses ,no hepato-splenomegaly,   EXTREMITIES:  no cyanosis,clubbing or edema  SKIN:  No rash/erythema/ecchymoses/petechiae/wounds/abscess/warm/dry  NEURO:  Alert, oriented    Vital Signs:  Vital Signs Last 24 Hrs  T(C): 37.1 (08 Jan 2019 15:35), Max: 37.1 (08 Jan 2019 15:35)  T(F): 98.7 (08 Jan 2019 15:35), Max: 98.7 (08 Jan 2019 15:35)  HR: 63 (08 Jan 2019 15:35) (58 - 76)  BP: 107/55 (08 Jan 2019 15:35) (107/55 - 135/77)  BP(mean): --  RR: 18 (08 Jan 2019 15:35) (18 - 18)  SpO2: 96% (08 Jan 2019 15:35) (96% - 100%)  Daily     Daily     LABS:                        10.4   6.66  )-----------( 240      ( 08 Jan 2019 05:45 )             32.1     01-08    138  |  98  |  6<L>  ----------------------------<  222<H>  4.0   |  28  |  0.63    Ca    9.9      08 Jan 2019 05:45  Phos  3.8     01-08  Mg     1.9     01-08    TPro  6.9  /  Alb  3.1<L>  /  TBili  1.6<H>  /  DBili  x   /  AST  39<H>  /  ALT  37<H>  /  AlkPhos  516<H>  01-08    LIVER FUNCTIONS - ( 08 Jan 2019 05:45 )  Alb: 3.1 g/dL / Pro: 6.9 g/dL / ALK PHOS: 516 u/L / ALT: 37 u/L / AST: 39 u/L / GGT: x           PT/INR - ( 07 Jan 2019 05:25 )   PT: 16.1 SEC;   INR: 1.40          PTT - ( 07 Jan 2019 05:25 )  PTT:30.6 SEC        Imaging:    < from: CT Abdomen and Pelvis w/ IV Cont (01.05.19 @ 06:42) >  LIVER: Normal morphology. Heterogeneous enhancement in the right lobe   with questionable hypodensity in segment 7 measuring 1.3 cm (series 2   image 20).  BILE DUCTS: Intrahepatic and extrahepatic biliary ductal dilatation; the   common bile duct measures up to 1.9 cm wall thickening and   hyperattenuation. The biliary duct catheter pigtails arein the distal   common bile duct and the small bowel at the level of the duodenal jejunal   flexure. Mild pneumobilia likely secondary to recent ERCP.  GALLBLADDER: Gallbladder wall edema cholelithiasis.  SPLEEN: Within normal limits.  PANCREAS: The main pancreatic duct measures up to 0.6 cm in the   pancreatic head, similar in appearance to the prior exam 12/21/2018.  ADRENALS: Within normal limits.  KIDNEYS/URETERS: Heterogeneous patchy hypoenhancement involving the right   midpole (series 2 image 31 and series 602 image 64). No hydronephrosis.    BLADDER: Within normal limits.  REPRODUCTIVE ORGANS: Unremarkable CT appearance of the uterus and adnexa.    BOWEL: No bowel obstruction. Appendix is normal.  PERITONEUM: No ascites or pneumoperitoneum.  VESSELS:  Normal caliber abdominal aorta.  RETROPERITONEUM: Prominent retroperitoneal nodes, a reference precarinal   node measures 1.0 cm (series 2 image 33).    ABDOMINAL WALL: Multiple subcutaneous nodules within the ventral abdomen.   For example, there is a 1.6 cm nodule in the left ventral abdomen (2, 76)   and a 1.6 cm nodule in the right ventral abdomen (2, 79), likely related   to injection granulomas; correlate clinically.  BONES: Mild degenerative changes of the spine.    IMPRESSION:     Intra and extrahepatic biliary ductal dilatation is again noted with   pneumobilia likely related to recent ERCP. Hyperattenuation and mild wall   thickening involving the CBD raises the possibility of cholangitis;   correlate with laboratory values. CBD stent located in the distal CBD and   duodenum.    Heterogeneity of the right hepatic lobe with questionable 1.3 cm   hypoattenuating lesion in segment 7.    Heterogeneous patchy hypoenhancement involving the right kidney;   correlate clinically for pyelonephritis.        < end of copied text >      ERCP 1/7/2018    Impression:   - During the EGD, one stent was removed from the biliary tree.   - ERCP was then done with one new plastic stent placed into the common bile duct.     Recommendation:   - Return patient to hospital [facility] for ongoing care.   - Clear liquid diet.   - Continue antibiotics.   - Continue IVF LR  - Patient needs to follow with surg onc for resection of the ampullary adenocarcinoma.

## 2019-01-08 NOTE — PROGRESS NOTE ADULT - ASSESSMENT
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 bili.     Plan/recommendation:  - please obtain MRI with contrast to evaluate liver lesion in segment 7  - Appreciate GI recs, s/p ERCP    MILAGROS Kaur PGY2  Surgical Oncology (D Team)  p. [id number]

## 2019-01-08 NOTE — PROGRESS NOTE ADULT - ASSESSMENT
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 coli bacteremia.     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 coli bacteremia.     # Invasive ampullary adenocarcinoma: s/p biopsy of ampullary mass done at [facility] ( from ERCP done in december 2018) showing   she was admitted to [facility] in mid december 2018 and was found to have CBD dilatation up to 12mm and cholelithiasis on US, MRCP showing dilated common bile duct with an abrupt transition at the very distal common bile duct, concerning for bile duct stone or cholangiocarcinoma, s/p ERCP with plastic stent placement and biopsy confirming invasive ampullary adenocarcinoma. Pt was discharged home and was told to follow with surgical oncology for management.   - MRI abdomen done and CT chest performed. MRI abdomen showing    During the EGD, one stent was removed from the biliary tree.   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 coli bacteremia.     # Invasive ampullary adenocarcinoma:   -s/p biopsy of ampullary mass done at [facility] ( from ERCP done in december 2018) showing invasive ampullary adenocarcinoma, foci consistent with lymphovascular invasion, ampullary surface epithelium showed high grade dysplasia and surface ulceration. Adenocarcinoma invades at least muscularis mucosae.  -  elevated at 318, CEA normal. AFP normal.  - MRI abdomen done here showing "Mild dilatation of the pancreatic duct at the level of the head of the pancreas up to 5 mm. Small T2 hyperintense lesions in the body and tail of the pancreas the largest measuring up to 5 mm, likely low-grade  cystic pancreatic neoplasm such as IPMN.  - CT abdomen and pelvis with contrast showing " Prominent retroperitoneal nodes, a reference precarinal node measures 1.0 cm"  - likely needs PET scan as outpatient to confirm  - appreciate surgical oncology input. is she a resectable candidate at this time? if so , she can be seen at [facility] after pathology is back to discuss adjuvant treatment options  - she was admitted to [facility] in mid december 2018 and was found to have CBD dilatation up to 12mm and cholelithiasis on US, MRCP showing dilated common bile duct with an abrupt transition at the very distal common bile duct, concerning for bile duct stone or cholangiocarcinoma, s/p ERCP with plastic stent placement and biopsy confirming invasive ampullary adenocarcinoma. Pt was discharged home and was told to follow with surgical oncology for management.   -  she had an EGD and ERCP done here on 1-7-19 , one stent was removed from the biliary tree. 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 coli bacteremia.     # Invasive ampullary adenocarcinoma:   -s/p biopsy of ampullary mass done at [facility] ( from ERCP done in december 2018) showing invasive ampullary adenocarcinoma, foci consistent with lymphovascular invasion, ampullary surface epithelium showed high grade dysplasia and surface ulceration. Adenocarcinoma invades at least muscularis mucosae.  -  elevated at 318, CEA normal. AFP normal.  - MRI abdomen done here showing "Mild dilatation of the pancreatic duct at the level of the head of the pancreas up to 5 mm. Small T2 hyperintense lesions in the body and tail of the pancreas the largest measuring up to 5 mm, likely low-grade  cystic pancreatic neoplasm such as IPMN.  - CT abdomen and pelvis with contrast showing " Prominent retroperitoneal nodes, a reference precarinal node measures 1.0 cm"  - likely needs PET scan as outpatient to confirm  - appreciate surgical oncology input. is she a resectable candidate at this time? if so , she can be seen at [facility] after pathology is back to discuss adjuvant treatment options if needed  - she was admitted to [facility] in mid december 2018 and was found to have CBD dilatation up to 12mm and cholelithiasis on US, MRCP showing dilated common bile duct with an abrupt transition at the very distal common bile duct, concerning for bile duct stone or cholangiocarcinoma, s/p ERCP with plastic stent placement and biopsy confirming invasive ampullary adenocarcinoma. Pt was discharged home and was told to follow with surgical oncology for management.   -  she had an EGD and ERCP done here on 1-7-19 , one stent was removed from the biliary tree. a new plastic stent was placed into the common bile duct

## 2019-01-08 NOTE — PROGRESS NOTE ADULT - PROBLEM SELECTOR PLAN 4
- Recent biopsy on 12/22 shows invasive adenocarcinoma in the CBD  - ERCP performed on 12/22 with placement of plastic biliary stent   - Has not had oncology follow up due to insurance issues   - Onc: MRI abdomen/pelvis w/ contrast for characterization of liver lesion, CT Chest to complete staging  - Appreciate Surg onc recs, but patient not a candidate for surgery at this time due to active infection - Recent biopsy on 12/22 shows invasive adenocarcinoma in the CBD  - ERCP performed on 12/22/18 with placement of plastic biliary stent, stent removed and replaced on 1/7/19  - Has not had oncology follow up due to insurance issues   - Onc: MRI abdomen/pelvis w/ contrast for characterization of liver lesion, CT Chest to complete staging  - Surg onc: not a candidate for surgery at this time due to active infection - Recent biopsy on 12/22 shows invasive adenocarcinoma in the CBD  - ERCP performed on 12/22/18 with placement of plastic biliary stent, stent removed and replaced on 1/7/19  - Has not had oncology follow up due to insurance issues   - Onc: MRI abdomen/pelvis w/ contrast for characterization of liver lesion, CT Chest to complete staging

## 2019-01-08 NOTE — PROGRESS NOTE ADULT - ATTENDING COMMENTS
Pt seen and examined. 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 coli bacteremia. Seen by surg onc. Possible surgery in the future. For now, supportive care, abx. A/w additional work up as above.

## 2019-01-08 NOTE — PROGRESS NOTE ADULT - SUBJECTIVE AND OBJECTIVE BOX
[facility] Surgical Oncology Progress Note    Subjective: Patient seen and examined. Patient underwent ERCP yesterday, CBD stent exchanged.  Patient tolerated the procedure well.  Has been afebrile overnight, abdominal pain improved.     Vital Signs Last 24 Hrs  T(C): 36.9 (01-08-19 @ 05:31), Max: 36.9 (01-08-19 @ 05:31)  HR: 58 (01-08-19 @ 05:31) (58 - 76)  BP: 132/80 (01-08-19 @ 05:31) (115/70 - 150/88)  RR: 18 (01-08-19 @ 05:31) (18 - 18)  SpO2: 97% (01-08-19 @ 05:31) (95% - 100%)  Wt(kg): --      Physical Exam:  General: Well developed, well nourished, No Acute Distress, uncomfortable  HEENT: Normocephalic Atraumatic, EOMI bl  Neurology: A&Ox3  Abdominal: Soft, RUQ tenderness, mild epigastric tenderness, moderately distended  Extremities: No Clubbing, cyanosis or edema, FROM  Skin: warm, dry, normal color, no rash or abnormal lesions    LABS:                        10.4   6.66  )-----------( 240      ( 08 Jan 2019 05:45 )             32.1     01-08    138  |  98  |  6<L>  ----------------------------<  222<H>  4.0   |  28  |  0.63    Ca    9.9      08 Jan 2019 05:45  Phos  3.8     01-08  Mg     1.9     01-08    TPro  6.9  /  Alb  3.1<L>  /  TBili  1.6<H>  /  DBili  x   /  AST  39<H>  /  ALT  37<H>  /  AlkPhos  516<H>  01-08    LIVER FUNCTIONS - ( 08 Jan 2019 05:45 )  Alb: 3.1 g/dL / Pro: 6.9 g/dL / ALK PHOS: 516 u/L / ALT: 37 u/L / AST: 39 u/L / GGT: x           PT/INR - ( 07 Jan 2019 05:25 )   PT: 16.1 SEC;   INR: 1.40          PTT - ( 07 Jan 2019 05:25 )  PTT:30.6 SEC

## 2019-01-08 NOTE — PROGRESS NOTE ADULT - PROBLEM SELECTOR PLAN 2
- Pt febrile, leukocytosis, tachycardic and elevated lactate meeting severe sepsis requirements   - cholangitis the most likely source   - c/w Zosyn   - f/u blood cultures: currently with E.coli, f/u sensitivities, repeat Bcx sent today  - Monitor VS - Pt met severe sepsis criteria, febrile, leukocytosis, tachycardic and elevated lactate 2/2 cholangitis and E coli bacteremia  - Now afebrile, normal WBC count, HD stable  - c/w Zosyn   - Repeat Bcx NGTD

## 2019-01-08 NOTE — PROGRESS NOTE ADULT - SUBJECTIVE AND OBJECTIVE BOX
Brandon Zhang, PGY1  Pager [phone]    Patient is a 53y old  Female who presents with a chief complaint of cholangitis (08 Jan 2019 06:49)      SUBJECTIVE/INTERVAL EVENTS: Patient seen and examined at bedside. JAILYN o/n. States is feeling better. Is tolerating CLD without n/v, abd pain improved.    MEDICATIONS  (STANDING):  dextrose 5%. 1000 milliLiter(s) (50 mL/Hr) IV Continuous <Continuous>  dextrose 50% Injectable 12.5 Gram(s) IV Push once  dextrose 50% Injectable 25 Gram(s) IV Push once  dextrose 50% Injectable 25 Gram(s) IV Push once  DULoxetine 60 milliGRAM(s) Oral daily  gabapentin 100 milliGRAM(s) Oral three times a day  insulin lispro (HumaLOG) corrective regimen sliding scale   SubCutaneous Before meals and at bedtime  lactated ringers. 1000 milliLiter(s) (150 mL/Hr) IV Continuous <Continuous>  piperacillin/tazobactam IVPB. 3.375 Gram(s) IV Intermittent every 8 hours    MEDICATIONS  (PRN):  acetaminophen   Tablet .. 650 milliGRAM(s) Oral every 6 hours PRN Temp greater or equal to 38C (100.4F), Mild Pain (1 - 3)  benzocaine 15 mG/menthol 3.6 mG Lozenge 1 Lozenge Oral every 4 hours PRN Sore Throat  benzonatate 100 milliGRAM(s) Oral three times a day PRN Cough  dextrose 40% Gel 15 Gram(s) Oral once PRN Blood Glucose LESS THAN 70 milliGRAM(s)/deciliter  glucagon  Injectable 1 milliGRAM(s) IntraMuscular once PRN Glucose LESS THAN 70 milligrams/deciliter  ondansetron Injectable 8 milliGRAM(s) IV Push every 6 hours PRN Nausea and/or Vomiting  traMADol 50 milliGRAM(s) Oral every 8 hours PRN Severe Pain (7 - 10)      VITAL SIGNS:  T(F): 98.4 (01-08-19 @ 05:31), Max: 98.4 (01-08-19 @ 05:31)  HR: 58 (01-08-19 @ 05:31) (58 - 76)  BP: 132/80 (01-08-19 @ 05:31) (115/70 - 150/88)  RR: 18 (01-08-19 @ 05:31) (18 - 18)  SpO2: 97% (01-08-19 @ 05:31) (95% - 100%)    I&O's Summary    Daily     Daily     PHYSICAL EXAM:  Gen: Alert, well-developed, NAD  HEENT: NCAT, EOMI, conjunctiva clear, sclera anicteric  Neck: Supple, no JVD  CV: RRR, S1S2, no m/r/g  Resp: CTAB, normal respiratory effort  Abd: Soft, nontender, nondistended, normal bowel sounds  Ext: + peripheral pulses, no edema, clubbing, or cyanosis  Neuro: AOx3, no focal deficits  SKIN: No rashes or lesions    LABS:                        10.4   6.66  )-----------( 240      ( 08 Jan 2019 05:45 )             32.1     01-08    138  |  98  |  6<L>  ----------------------------<  222<H>  4.0   |  28  |  0.63    Ca    9.9      08 Jan 2019 05:45  Phos  3.8     01-08  Mg     1.9     01-08    TPro  6.9  /  Alb  3.1<L>  /  TBili  1.6<H>  /  DBili  x   /  AST  39<H>  /  ALT  37<H>  /  AlkPhos  516<H>  01-08    LIVER FUNCTIONS - ( 08 Jan 2019 05:45 )  Alb: 3.1 g/dL / Pro: 6.9 g/dL / ALK PHOS: 516 u/L / ALT: 37 u/L / AST: 39 u/L / GGT: x           PT/INR - ( 07 Jan 2019 05:25 )   PT: 16.1 SEC;   INR: 1.40          PTT - ( 07 Jan 2019 05:25 )  PTT:30.6 SEC          CAPILLARY BLOOD GLUCOSE      POCT Blood Glucose.: 193 mg/dL (08 Jan 2019 09:32)  POCT Blood Glucose.: 253 mg/dL (07 Jan 2019 23:41)  POCT Blood Glucose.: 247 mg/dL (07 Jan 2019 22:24)  POCT Blood Glucose.: 110 mg/dL (07 Jan 2019 17:53)  POCT Blood Glucose.: 110 mg/dL (07 Jan 2019 12:21)      RADIOLOGY & ADDITIONAL TESTS: Reviewed    Imaging Personally Reviewed:    Consultant(s) Notes Reviewed:      Care Discussed with Consultants/Other Providers: Brandon Zhang, PGY1  Pager [phone]    Patient is a 53y old  Female who presents with a chief complaint of cholangitis (08 Jan 2019 06:49)      SUBJECTIVE/INTERVAL EVENTS: Patient seen and examined at bedside. JAILYN o/n. States is feeling better. Is tolerating CLD without n/v, abd pain improved.    MEDICATIONS  (STANDING):  dextrose 5%. 1000 milliLiter(s) (50 mL/Hr) IV Continuous <Continuous>  dextrose 50% Injectable 12.5 Gram(s) IV Push once  dextrose 50% Injectable 25 Gram(s) IV Push once  dextrose 50% Injectable 25 Gram(s) IV Push once  DULoxetine 60 milliGRAM(s) Oral daily  gabapentin 100 milliGRAM(s) Oral three times a day  insulin lispro (HumaLOG) corrective regimen sliding scale   SubCutaneous Before meals and at bedtime  lactated ringers. 1000 milliLiter(s) (150 mL/Hr) IV Continuous <Continuous>  piperacillin/tazobactam IVPB. 3.375 Gram(s) IV Intermittent every 8 hours    MEDICATIONS  (PRN):  acetaminophen   Tablet .. 650 milliGRAM(s) Oral every 6 hours PRN Temp greater or equal to 38C (100.4F), Mild Pain (1 - 3)  benzocaine 15 mG/menthol 3.6 mG Lozenge 1 Lozenge Oral every 4 hours PRN Sore Throat  benzonatate 100 milliGRAM(s) Oral three times a day PRN Cough  dextrose 40% Gel 15 Gram(s) Oral once PRN Blood Glucose LESS THAN 70 milliGRAM(s)/deciliter  glucagon  Injectable 1 milliGRAM(s) IntraMuscular once PRN Glucose LESS THAN 70 milligrams/deciliter  ondansetron Injectable 8 milliGRAM(s) IV Push every 6 hours PRN Nausea and/or Vomiting  traMADol 50 milliGRAM(s) Oral every 8 hours PRN Severe Pain (7 - 10)      VITAL SIGNS:  T(F): 98.4 (01-08-19 @ 05:31), Max: 98.4 (01-08-19 @ 05:31)  HR: 58 (01-08-19 @ 05:31) (58 - 76)  BP: 132/80 (01-08-19 @ 05:31) (115/70 - 150/88)  RR: 18 (01-08-19 @ 05:31) (18 - 18)  SpO2: 97% (01-08-19 @ 05:31) (95% - 100%)    I&O's Summary    Daily     Daily     PHYSICAL EXAM:  Gen: Alert, well-developed, NAD  HEENT: NCAT, EOMI, conjunctiva clear, sclera anicteric  Neck: Supple, no JVD  CV: RRR, S1S2, no m/r/g  Resp: CTAB, normal respiratory effort  Abd: Soft, nontender, nondistended, normal bowel sounds  Ext: + peripheral pulses, no edema, clubbing, or cyanosis  Neuro: AOx3, no focal deficits  SKIN: No rashes or lesions    LABS:                        10.4   6.66  )-----------( 240      ( 08 Jan 2019 05:45 )             32.1     01-08    138  |  98  |  6<L>  ----------------------------<  222<H>  4.0   |  28  |  0.63    Ca    9.9      08 Jan 2019 05:45  Phos  3.8     01-08  Mg     1.9     01-08    TPro  6.9  /  Alb  3.1<L>  /  TBili  1.6<H>  /  DBili  x   /  AST  39<H>  /  ALT  37<H>  /  AlkPhos  516<H>  01-08    LIVER FUNCTIONS - ( 08 Jan 2019 05:45 )  Alb: 3.1 g/dL / Pro: 6.9 g/dL / ALK PHOS: 516 u/L / ALT: 37 u/L / AST: 39 u/L / GGT: x           PT/INR - ( 07 Jan 2019 05:25 )   PT: 16.1 SEC;   INR: 1.40          PTT - ( 07 Jan 2019 05:25 )  PTT:30.6 SEC    Culture - Blood:   NO ORGANISMS ISOLATED  NO ORGANISMS ISOLATED AT 24 HOURS (01.07.19 @ 05:56)    Culture - Blood in AM (01.07.19 @ 05:56)    Culture - Blood:   NO ORGANISMS ISOLATED  NO ORGANISMS ISOLATED AT 24 HOURS    Specimen Source: BLOOD PERIPHERAL        CAPILLARY BLOOD GLUCOSE      POCT Blood Glucose.: 193 mg/dL (08 Jan 2019 09:32)  POCT Blood Glucose.: 253 mg/dL (07 Jan 2019 23:41)  POCT Blood Glucose.: 247 mg/dL (07 Jan 2019 22:24)  POCT Blood Glucose.: 110 mg/dL (07 Jan 2019 17:53)  POCT Blood Glucose.: 110 mg/dL (07 Jan 2019 12:21)      RADIOLOGY & ADDITIONAL TESTS: Reviewed    Imaging Personally Reviewed:  < from: CT Chest w/ IV Cont (01.07.19 @ 21:20) >  IMPRESSION: Small bilateral pleural effusions with bibasilar atelectasis.    No lung nodules.    Mildly enlarged right cardiophrenic angle lymph node is unchanged since   December 18, 2018 is indeterminate.    < end of copied text >    < from: MR Abdomen w/ IV Cont (01.08.19 @ 10:17) >  IMPRESSION:   *  No suspicious liver lesions. No abnormality is identified to   correspond with questionable lesion identified on prior CT.  *  Intra and extrahepatic biliary ductal dilation, with mural enhancement   which may be seen in the setting of cholangitis.  *  Cholelithiasis with gallbladder wall thickening.    < end of copied text >    Consultant(s) Notes Reviewed:      Care Discussed with Consultants/Other Providers: Dr. Bourne (Surg-Onc) re: plan of care

## 2019-01-08 NOTE — PROGRESS NOTE ADULT - PROBLEM SELECTOR PLAN 1
- RUQ pain, jaundice at admission, and fever, fulfilling charcot's traid   - Has a known tumor in the CBD and recent ERCP, put at increase risk of cholangitis   - c/w Zosyn (started on 1/5)  - Appreciate GI recs: plan for ERCP today - RUQ pain, jaundice at admission, and fever, fulfilling charcot's traid   - Has a known tumor in the CBD and recent ERCP, put at increase risk of cholangitis   - c/w Zosyn (1/5-)  - s/p ERCP on 1/7 with replacement of biliary stent  - tolerating CLD, IV hydration

## 2019-01-08 NOTE — PROGRESS NOTE ADULT - PROBLEM SELECTOR PLAN 6
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

## 2019-01-09 DIAGNOSIS — Z01.818 ENCOUNTER FOR OTHER PREPROCEDURAL EXAMINATION: ICD-10-CM

## 2019-01-09 LAB
ALBUMIN SERPL ELPH-MCNC: 3.2 G/DL — LOW (ref 3.3–5)
ALP SERPL-CCNC: 421 U/L — HIGH (ref 40–120)
ALT FLD-CCNC: 32 U/L — SIGNIFICANT CHANGE UP (ref 4–33)
ANION GAP SERPL CALC-SCNC: 11 MEQ/L — SIGNIFICANT CHANGE UP (ref 7–14)
AST SERPL-CCNC: 36 U/L — HIGH (ref 4–32)
BASOPHILS # BLD AUTO: 0.02 K/UL — SIGNIFICANT CHANGE UP (ref 0–0.2)
BASOPHILS NFR BLD AUTO: 0.2 % — SIGNIFICANT CHANGE UP (ref 0–2)
BILIRUB SERPL-MCNC: 1.1 MG/DL — SIGNIFICANT CHANGE UP (ref 0.2–1.2)
BUN SERPL-MCNC: 7 MG/DL — SIGNIFICANT CHANGE UP (ref 7–23)
CALCIUM SERPL-MCNC: 9 MG/DL — SIGNIFICANT CHANGE UP (ref 8.4–10.5)
CHLORIDE SERPL-SCNC: 103 MMOL/L — SIGNIFICANT CHANGE UP (ref 98–107)
CO2 SERPL-SCNC: 27 MMOL/L — SIGNIFICANT CHANGE UP (ref 22–31)
CREAT SERPL-MCNC: 0.68 MG/DL — SIGNIFICANT CHANGE UP (ref 0.5–1.3)
EOSINOPHIL # BLD AUTO: 0.02 K/UL — SIGNIFICANT CHANGE UP (ref 0–0.5)
EOSINOPHIL NFR BLD AUTO: 0.2 % — SIGNIFICANT CHANGE UP (ref 0–6)
FERRITIN SERPL-MCNC: 137.5 NG/ML — SIGNIFICANT CHANGE UP (ref 15–150)
GLUCOSE SERPL-MCNC: 125 MG/DL — HIGH (ref 70–99)
HCT VFR BLD CALC: 29.8 % — LOW (ref 34.5–45)
HGB BLD-MCNC: 9.6 G/DL — LOW (ref 11.5–15.5)
IMM GRANULOCYTES NFR BLD AUTO: 0.5 % — SIGNIFICANT CHANGE UP (ref 0–1.5)
IRON SATN MFR SERPL: 253 UG/DL — SIGNIFICANT CHANGE UP (ref 140–530)
IRON SATN MFR SERPL: 52 UG/DL — SIGNIFICANT CHANGE UP (ref 30–160)
LYMPHOCYTES # BLD AUTO: 3.57 K/UL — HIGH (ref 1–3.3)
LYMPHOCYTES # BLD AUTO: 35.3 % — SIGNIFICANT CHANGE UP (ref 13–44)
MAGNESIUM SERPL-MCNC: 2 MG/DL — SIGNIFICANT CHANGE UP (ref 1.6–2.6)
MCHC RBC-ENTMCNC: 24.6 PG — LOW (ref 27–34)
MCHC RBC-ENTMCNC: 32.2 % — SIGNIFICANT CHANGE UP (ref 32–36)
MCV RBC AUTO: 76.4 FL — LOW (ref 80–100)
MONOCYTES # BLD AUTO: 0.76 K/UL — SIGNIFICANT CHANGE UP (ref 0–0.9)
MONOCYTES NFR BLD AUTO: 7.5 % — SIGNIFICANT CHANGE UP (ref 2–14)
NEUTROPHILS # BLD AUTO: 5.68 K/UL — SIGNIFICANT CHANGE UP (ref 1.8–7.4)
NEUTROPHILS NFR BLD AUTO: 56.3 % — SIGNIFICANT CHANGE UP (ref 43–77)
NRBC # FLD: 0 K/UL — LOW (ref 25–125)
PHOSPHATE SERPL-MCNC: 3.4 MG/DL — SIGNIFICANT CHANGE UP (ref 2.5–4.5)
PLATELET # BLD AUTO: 238 K/UL — SIGNIFICANT CHANGE UP (ref 150–400)
PMV BLD: 10.9 FL — SIGNIFICANT CHANGE UP (ref 7–13)
POTASSIUM SERPL-MCNC: 3.6 MMOL/L — SIGNIFICANT CHANGE UP (ref 3.5–5.3)
POTASSIUM SERPL-SCNC: 3.6 MMOL/L — SIGNIFICANT CHANGE UP (ref 3.5–5.3)
PROT SERPL-MCNC: 7 G/DL — SIGNIFICANT CHANGE UP (ref 6–8.3)
RBC # BLD: 3.9 M/UL — SIGNIFICANT CHANGE UP (ref 3.8–5.2)
RBC # FLD: 15.3 % — HIGH (ref 10.3–14.5)
SODIUM SERPL-SCNC: 141 MMOL/L — SIGNIFICANT CHANGE UP (ref 135–145)
UIBC SERPL-MCNC: 200.8 UG/DL — SIGNIFICANT CHANGE UP (ref 110–370)
WBC # BLD: 10.1 K/UL — SIGNIFICANT CHANGE UP (ref 3.8–10.5)
WBC # FLD AUTO: 10.1 K/UL — SIGNIFICANT CHANGE UP (ref 3.8–10.5)

## 2019-01-09 PROCEDURE — 99233 SBSQ HOSP IP/OBS HIGH 50: CPT | Mod: GC

## 2019-01-09 PROCEDURE — 99232 SBSQ HOSP IP/OBS MODERATE 35: CPT | Mod: GC

## 2019-01-09 PROCEDURE — 99232 SBSQ HOSP IP/OBS MODERATE 35: CPT

## 2019-01-09 RX ADMIN — GABAPENTIN 100 MILLIGRAM(S): 400 CAPSULE ORAL at 13:22

## 2019-01-09 RX ADMIN — DULOXETINE HYDROCHLORIDE 60 MILLIGRAM(S): 30 CAPSULE, DELAYED RELEASE ORAL at 12:43

## 2019-01-09 RX ADMIN — PIPERACILLIN AND TAZOBACTAM 25 GRAM(S): 4; .5 INJECTION, POWDER, LYOPHILIZED, FOR SOLUTION INTRAVENOUS at 12:43

## 2019-01-09 RX ADMIN — Medication 1: at 17:51

## 2019-01-09 RX ADMIN — PIPERACILLIN AND TAZOBACTAM 25 GRAM(S): 4; .5 INJECTION, POWDER, LYOPHILIZED, FOR SOLUTION INTRAVENOUS at 19:27

## 2019-01-09 RX ADMIN — ENOXAPARIN SODIUM 40 MILLIGRAM(S): 100 INJECTION SUBCUTANEOUS at 12:43

## 2019-01-09 RX ADMIN — GABAPENTIN 100 MILLIGRAM(S): 400 CAPSULE ORAL at 05:26

## 2019-01-09 RX ADMIN — POLYETHYLENE GLYCOL 3350 17 GRAM(S): 17 POWDER, FOR SOLUTION ORAL at 12:43

## 2019-01-09 RX ADMIN — GABAPENTIN 100 MILLIGRAM(S): 400 CAPSULE ORAL at 21:23

## 2019-01-09 RX ADMIN — SENNA PLUS 2 TABLET(S): 8.6 TABLET ORAL at 21:23

## 2019-01-09 RX ADMIN — PIPERACILLIN AND TAZOBACTAM 25 GRAM(S): 4; .5 INJECTION, POWDER, LYOPHILIZED, FOR SOLUTION INTRAVENOUS at 04:27

## 2019-01-09 NOTE — PROGRESS NOTE ADULT - PROBLEM SELECTOR PLAN 6
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

## 2019-01-09 NOTE — PROGRESS NOTE ADULT - SUBJECTIVE AND OBJECTIVE BOX
[facility] Surgical Oncology Progress Note    Subjective: Patient seen and examined. No complaints, not having any abdominal pain.  Tolerating a diet without N/V.  MRI performed yesterday without evidence of liver lesion.      T(C): 36.4 (01-09-19 @ 05:34), Max: 37.1 (01-08-19 @ 15:35)  HR: 59 (01-09-19 @ 05:34) (51 - 66)  BP: 129/82 (01-09-19 @ 05:34) (107/55 - 131/75)  RR: 17 (01-09-19 @ 05:34) (17 - 18)  SpO2: 99% (01-09-19 @ 05:34) (96% - 99%)  Wt(kg): --      Physical Exam:  General: Well developed, well nourished, No Acute Distress, uncomfortable  HEENT: Normocephalic Atraumatic, EOMI bl  Neurology: A&Ox3  Abdominal: Soft, RUQ tenderness, mild epigastric tenderness, moderately distended  Extremities: No Clubbing, cyanosis or edema, FROM  Skin: warm, dry, normal color, no rash or abnormal lesions    LABS:                        9.6    10.10 )-----------( 238      ( 09 Jan 2019 06:04 )             29.8     01-09    141  |  103  |  7   ----------------------------<  125<H>  3.6   |  27  |  0.68    Ca    9.0      09 Jan 2019 06:04  Phos  3.4     01-09  Mg     2.0     01-09    TPro  7.0  /  Alb  3.2<L>  /  TBili  1.1  /  DBili  x   /  AST  36<H>  /  ALT  32  /  AlkPhos  421<H>  01-09    LIVER FUNCTIONS - ( 09 Jan 2019 06:04 )  Alb: 3.2 g/dL / Pro: 7.0 g/dL / ALK PHOS: 421 u/L / ALT: 32 u/L / AST: 36 u/L / GGT: x             Imaging:   < from: MR Abdomen w/ IV Cont (01.08.19 @ 10:17) >  IMPRESSION:   *  No suspicious liver lesions. No abnormality is identified to   correspond with questionable lesion identified on prior CT.  *  Intra and extrahepatic biliary ductal dilation, with mural enhancement   which may be seen in the setting of cholangitis.  *  Cholelithiasis with gallbladder wall thickening.      < end of copied text >

## 2019-01-09 NOTE — PROGRESS NOTE ADULT - PROBLEM SELECTOR PROBLEM 5
Type 2 diabetes mellitus without complication, without long-term current use of insulin Preop examination

## 2019-01-09 NOTE — PROGRESS NOTE ADULT - PROBLEM SELECTOR PLAN 5
- A1C 8.0 on 12/19  - q6h FS, ISS on CLD - A1C 8.0 on 12/19  - q6h FS, ISS on soft diet -RCRI score of 1, patient is medically optimized and may proceed to the OR without further medical w/u  -Patient is low risk for a high-risk procedure

## 2019-01-09 NOTE — PROGRESS NOTE ADULT - PROBLEM SELECTOR PLAN 7
- VTE: Lovenox   - Diet: NPO  dispo: home with surg onc follow up
- VTE: Lovenox   - Diet: NPO  dispo: home with surg onc follow up
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

## 2019-01-09 NOTE — PROGRESS NOTE ADULT - ASSESSMENT
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 bili s/p ERCP and stent exchange.  LFTs downtrending, no evidence of liver lesion on MRI.      Plan/recommendation:  - Plan for [facility] this Friday  - will need pre-op TTE  - Preop labs, NPO after midnight tomorrow  - will transfer patient to surg onc service for pre and post op care    MILAGROS Kaur PGY2  Surgical Oncology (D Team)  p. [phone]

## 2019-01-09 NOTE — PROGRESS NOTE ADULT - PROBLEM SELECTOR PLAN 3
-Likely 2/2 cholangitis  -c/w Zosyn, sensitivty confirmed  - f/u repeat blood cultures NGTD -Likely 2/2 cholangitis  -c/w Zosyn, sensitivity confirmed  - f/u repeat blood cultures NGTD

## 2019-01-09 NOTE — PROGRESS NOTE ADULT - PROBLEM SELECTOR PLAN 2
- Pt met severe sepsis criteria, febrile, leukocytosis, tachycardic and elevated lactate 2/2 cholangitis and E coli bacteremia  - Now afebrile, normal WBC count, HD stable  - c/w Zosyn   - Repeat Bcx NGTD

## 2019-01-09 NOTE — PROGRESS NOTE ADULT - ATTENDING COMMENTS
Patient seen and examined at bedside. Patient seen and examined at bedside. Daughter at bedside providing [facility] interpretation. Patient underwent ERCP on 1/7/19 without issue with anesthesia. Has anesthesia previously for eye surgery without issue. Is able to walk up a flight of stairs without CP or SOB. History reviewed, patient does not have a diagnosis of asthma. Reports she took albuterol 4-5 years ago when she was sick with a cold. Has diabetes and HLD but no history of HTN. On exam, patient is wo JVD, lungs CTAB, no w/r/r, cardiac exam with normal S1S2, RRR, no mrg. No LE edema and overall appears euvolemic. EKG is NSR, no pathologic q-waves or evidence of ischemia. TTE from 12/18 reviewed as well which reveals normal EF, unremarkable valves overall. Patient has an RCRI score of 1 making her low-risk for a high-risk procedure. Patient to be transferred to surgical service on 8T when bed is available.     She is medically optimized at this time and there is no medical contraindication to proceed to the OR.

## 2019-01-09 NOTE — PROGRESS NOTE ADULT - SUBJECTIVE AND OBJECTIVE BOX
Brandon Zhang, PGY1  Pager [phone]    Patient is a 53y old  Female who presents with a chief complaint of cholangitis (09 Jan 2019 06:44)      SUBJECTIVE/INTERVAL EVENTS: Patient seen and examined at bedside. HR 51-59 overnight, asymptomatic. States feels okay this AM. Denies any pain, is tolerating soft diet without n/v. Is constipated, was started on bowel regimen yesterday.    MEDICATIONS  (STANDING):  dextrose 5%. 1000 milliLiter(s) (50 mL/Hr) IV Continuous <Continuous>  dextrose 50% Injectable 12.5 Gram(s) IV Push once  dextrose 50% Injectable 25 Gram(s) IV Push once  dextrose 50% Injectable 25 Gram(s) IV Push once  DULoxetine 60 milliGRAM(s) Oral daily  enoxaparin Injectable 40 milliGRAM(s) SubCutaneous daily  gabapentin 100 milliGRAM(s) Oral three times a day  insulin lispro (HumaLOG) corrective regimen sliding scale   SubCutaneous Before meals and at bedtime  piperacillin/tazobactam IVPB. 3.375 Gram(s) IV Intermittent every 8 hours  polyethylene glycol 3350 17 Gram(s) Oral daily  senna 2 Tablet(s) Oral at bedtime    MEDICATIONS  (PRN):  acetaminophen   Tablet .. 650 milliGRAM(s) Oral every 6 hours PRN Temp greater or equal to 38C (100.4F), Mild Pain (1 - 3)  benzocaine 15 mG/menthol 3.6 mG Lozenge 1 Lozenge Oral every 4 hours PRN Sore Throat  benzonatate 100 milliGRAM(s) Oral three times a day PRN Cough  dextrose 40% Gel 15 Gram(s) Oral once PRN Blood Glucose LESS THAN 70 milliGRAM(s)/deciliter  glucagon  Injectable 1 milliGRAM(s) IntraMuscular once PRN Glucose LESS THAN 70 milligrams/deciliter  ondansetron Injectable 8 milliGRAM(s) IV Push every 6 hours PRN Nausea and/or Vomiting  traMADol 50 milliGRAM(s) Oral every 8 hours PRN Severe Pain (7 - 10)      VITAL SIGNS:  T(F): 97.6 (01-09-19 @ 05:34), Max: 98.7 (01-08-19 @ 15:35)  HR: 59 (01-09-19 @ 05:34) (51 - 66)  BP: 129/82 (01-09-19 @ 05:34) (107/55 - 131/75)  RR: 17 (01-09-19 @ 05:34) (17 - 18)  SpO2: 99% (01-09-19 @ 05:34) (96% - 99%)    I&O's Summary    Daily     Daily     PHYSICAL EXAM:  Gen: Alert, well-developed, NAD  HEENT: NCAT, EOMI, conjunctiva clear, sclera anicteric  Neck: Supple, no JVD  CV: RRR, S1S2, no m/r/g  Resp: CTAB, normal respiratory effort  Abd: Soft, nontender, nondistended, normal bowel sounds  Ext: + peripheral pulses, no edema, clubbing, or cyanosis  Neuro: AOx3, no focal deficits  SKIN: No rashes or lesions    LABS:                        9.6    10.10 )-----------( 238      ( 09 Jan 2019 06:04 )             29.8     01-08    138  |  98  |  6<L>  ----------------------------<  222<H>  4.0   |  28  |  0.63    Ca    9.9      08 Jan 2019 05:45  Phos  3.8     01-08  Mg     1.9     01-08    TPro  6.9  /  Alb  3.1<L>  /  TBili  1.6<H>  /  DBili  x   /  AST  39<H>  /  ALT  37<H>  /  AlkPhos  516<H>  01-08    LIVER FUNCTIONS - ( 08 Jan 2019 05:45 )  Alb: 3.1 g/dL / Pro: 6.9 g/dL / ALK PHOS: 516 u/L / ALT: 37 u/L / AST: 39 u/L / GGT: x                     CAPILLARY BLOOD GLUCOSE      POCT Blood Glucose.: 171 mg/dL (08 Jan 2019 22:03)  POCT Blood Glucose.: 137 mg/dL (08 Jan 2019 17:54)  POCT Blood Glucose.: 193 mg/dL (08 Jan 2019 12:18)  POCT Blood Glucose.: 193 mg/dL (08 Jan 2019 09:32)      RADIOLOGY & ADDITIONAL TESTS: Reviewed    Imaging Personally Reviewed:    Consultant(s) Notes Reviewed:      Care Discussed with Consultants/Other Providers: Brandon Zhang, PGY1  Pager [phone]    Patient is a 53y old  Female who presents with a chief complaint of cholangitis (09 Jan 2019 06:44)      SUBJECTIVE/INTERVAL EVENTS: Patient seen and examined at bedside. HR 51-59 overnight, asymptomatic. States feels okay this AM. Denies any pain, is tolerating soft diet without n/v. Is constipated, was started on bowel regimen yesterday, is passing gas.    MEDICATIONS  (STANDING):  dextrose 5%. 1000 milliLiter(s) (50 mL/Hr) IV Continuous <Continuous>  dextrose 50% Injectable 12.5 Gram(s) IV Push once  dextrose 50% Injectable 25 Gram(s) IV Push once  dextrose 50% Injectable 25 Gram(s) IV Push once  DULoxetine 60 milliGRAM(s) Oral daily  enoxaparin Injectable 40 milliGRAM(s) SubCutaneous daily  gabapentin 100 milliGRAM(s) Oral three times a day  insulin lispro (HumaLOG) corrective regimen sliding scale   SubCutaneous Before meals and at bedtime  piperacillin/tazobactam IVPB. 3.375 Gram(s) IV Intermittent every 8 hours  polyethylene glycol 3350 17 Gram(s) Oral daily  senna 2 Tablet(s) Oral at bedtime    MEDICATIONS  (PRN):  acetaminophen   Tablet .. 650 milliGRAM(s) Oral every 6 hours PRN Temp greater or equal to 38C (100.4F), Mild Pain (1 - 3)  benzocaine 15 mG/menthol 3.6 mG Lozenge 1 Lozenge Oral every 4 hours PRN Sore Throat  benzonatate 100 milliGRAM(s) Oral three times a day PRN Cough  dextrose 40% Gel 15 Gram(s) Oral once PRN Blood Glucose LESS THAN 70 milliGRAM(s)/deciliter  glucagon  Injectable 1 milliGRAM(s) IntraMuscular once PRN Glucose LESS THAN 70 milligrams/deciliter  ondansetron Injectable 8 milliGRAM(s) IV Push every 6 hours PRN Nausea and/or Vomiting  traMADol 50 milliGRAM(s) Oral every 8 hours PRN Severe Pain (7 - 10)      VITAL SIGNS:  T(F): 97.6 (01-09-19 @ 05:34), Max: 98.7 (01-08-19 @ 15:35)  HR: 59 (01-09-19 @ 05:34) (51 - 66)  BP: 129/82 (01-09-19 @ 05:34) (107/55 - 131/75)  RR: 17 (01-09-19 @ 05:34) (17 - 18)  SpO2: 99% (01-09-19 @ 05:34) (96% - 99%)    I&O's Summary    Daily     Daily     PHYSICAL EXAM:  Gen: Alert, well-developed, NAD  HEENT: NCAT, EOMI, conjunctiva clear, sclera anicteric  Neck: Supple, no JVD  CV: RRR, S1S2, no m/r/g  Resp: CTAB, normal respiratory effort  Abd: Soft, nontender, nondistended, normal bowel sounds  Ext: + peripheral pulses, no edema, clubbing, or cyanosis  Neuro: AOx3, no focal deficits  SKIN: No rashes or lesions    LABS:                        9.6    10.10 )-----------( 238      ( 09 Jan 2019 06:04 )             29.8     01-08    138  |  98  |  6<L>  ----------------------------<  222<H>  4.0   |  28  |  0.63    Ca    9.9      08 Jan 2019 05:45  Phos  3.8     01-08  Mg     1.9     01-08    TPro  6.9  /  Alb  3.1<L>  /  TBili  1.6<H>  /  DBili  x   /  AST  39<H>  /  ALT  37<H>  /  AlkPhos  516<H>  01-08    LIVER FUNCTIONS - ( 08 Jan 2019 05:45 )  Alb: 3.1 g/dL / Pro: 6.9 g/dL / ALK PHOS: 516 u/L / ALT: 37 u/L / AST: 39 u/L / GGT: x                     CAPILLARY BLOOD GLUCOSE      POCT Blood Glucose.: 171 mg/dL (08 Jan 2019 22:03)  POCT Blood Glucose.: 137 mg/dL (08 Jan 2019 17:54)  POCT Blood Glucose.: 193 mg/dL (08 Jan 2019 12:18)  POCT Blood Glucose.: 193 mg/dL (08 Jan 2019 09:32)      RADIOLOGY & ADDITIONAL TESTS: Reviewed    Imaging Personally Reviewed:    Consultant(s) Notes Reviewed:      Care Discussed with Consultants/Other Providers: Brandon Zhang, PGY1  Pager [phone]    Patient is a 53y old  Female who presents with a chief complaint of cholangitis (09 Jan 2019 06:44)      SUBJECTIVE/INTERVAL EVENTS: Patient seen and examined at bedside. HR 51-59 overnight, asymptomatic. States feels okay this AM. Denies any pain, is tolerating soft diet without n/v. Is constipated, was started on bowel regimen yesterday, is passing gas.    MEDICATIONS  (STANDING):  dextrose 5%. 1000 milliLiter(s) (50 mL/Hr) IV Continuous <Continuous>  dextrose 50% Injectable 12.5 Gram(s) IV Push once  dextrose 50% Injectable 25 Gram(s) IV Push once  dextrose 50% Injectable 25 Gram(s) IV Push once  DULoxetine 60 milliGRAM(s) Oral daily  enoxaparin Injectable 40 milliGRAM(s) SubCutaneous daily  gabapentin 100 milliGRAM(s) Oral three times a day  insulin lispro (HumaLOG) corrective regimen sliding scale   SubCutaneous Before meals and at bedtime  piperacillin/tazobactam IVPB. 3.375 Gram(s) IV Intermittent every 8 hours  polyethylene glycol 3350 17 Gram(s) Oral daily  senna 2 Tablet(s) Oral at bedtime    MEDICATIONS  (PRN):  acetaminophen   Tablet .. 650 milliGRAM(s) Oral every 6 hours PRN Temp greater or equal to 38C (100.4F), Mild Pain (1 - 3)  benzocaine 15 mG/menthol 3.6 mG Lozenge 1 Lozenge Oral every 4 hours PRN Sore Throat  benzonatate 100 milliGRAM(s) Oral three times a day PRN Cough  dextrose 40% Gel 15 Gram(s) Oral once PRN Blood Glucose LESS THAN 70 milliGRAM(s)/deciliter  glucagon  Injectable 1 milliGRAM(s) IntraMuscular once PRN Glucose LESS THAN 70 milligrams/deciliter  ondansetron Injectable 8 milliGRAM(s) IV Push every 6 hours PRN Nausea and/or Vomiting  traMADol 50 milliGRAM(s) Oral every 8 hours PRN Severe Pain (7 - 10)      VITAL SIGNS:  T(F): 97.6 (01-09-19 @ 05:34), Max: 98.7 (01-08-19 @ 15:35)  HR: 59 (01-09-19 @ 05:34) (51 - 66)  BP: 129/82 (01-09-19 @ 05:34) (107/55 - 131/75)  RR: 17 (01-09-19 @ 05:34) (17 - 18)  SpO2: 99% (01-09-19 @ 05:34) (96% - 99%)    I&O's Summary    Daily     Daily     PHYSICAL EXAM:  Gen: Alert, well-developed, NAD  HEENT: NCAT, EOMI, conjunctiva clear, sclera anicteric  Neck: Supple, no JVD  CV: RRR, S1S2, no m/r/g  Resp: CTAB, normal respiratory effort  Abd: Soft, nontender, nondistended, normal bowel sounds  Ext: + peripheral pulses, no edema, clubbing, or cyanosis  Neuro: AOx3, no focal deficits  SKIN: No rashes or lesions    LABS:                        9.6    10.10 )-----------( 238      ( 09 Jan 2019 06:04 )             29.8     01-08    138  |  98  |  6<L>  ----------------------------<  222<H>  4.0   |  28  |  0.63    Ca    9.9      08 Jan 2019 05:45  Phos  3.8     01-08  Mg     1.9     01-08    TPro  6.9  /  Alb  3.1<L>  /  TBili  1.6<H>  /  DBili  x   /  AST  39<H>  /  ALT  37<H>  /  AlkPhos  516<H>  01-08    LIVER FUNCTIONS - ( 08 Jan 2019 05:45 )  Alb: 3.1 g/dL / Pro: 6.9 g/dL / ALK PHOS: 516 u/L / ALT: 37 u/L / AST: 39 u/L / GGT: x             CAPILLARY BLOOD GLUCOSE      POCT Blood Glucose.: 171 mg/dL (08 Jan 2019 22:03)  POCT Blood Glucose.: 137 mg/dL (08 Jan 2019 17:54)  POCT Blood Glucose.: 193 mg/dL (08 Jan 2019 12:18)  POCT Blood Glucose.: 193 mg/dL (08 Jan 2019 09:32)      RADIOLOGY & ADDITIONAL TESTS: Reviewed    Imaging Personally Reviewed:  < from: TTE Echo Doppler w/o Cont (12.19.18 @ 10:59) >    Summary:   1. Left ventricular ejection fraction, by visual estimation, is 60 to   65%.   2. There is no left ventricular hypertrophy.   3. Mild tricuspid regurgitation.   4. Pulmonic valve regurgitation.    < end of copied text >    EKG: NSR, no q-waves, ST changes, evidence of ischemia    Consultant(s) Notes Reviewed:  Surg-Onc, GI    Care Discussed with Consultants/Other Providers: Dr. Kaur (surg-onc) re: plan of care

## 2019-01-09 NOTE — PROGRESS NOTE ADULT - PROBLEM SELECTOR PLAN 1
- RUQ pain, jaundice at admission, and fever, fulfilling charcot's traid   - Has a known tumor in the CBD and recent ERCP, put at increase risk of cholangitis   - c/w Zosyn (1/5-)  - s/p ERCP on 1/7 with replacement of biliary stent  - tolerating CLD, IV hydration - RUQ pain, jaundice at admission, and fever, fulfilling charcot's triad in setting of known tumor in the CBD and recent ERCP   - s/p ERCP on 1/7 with replacement of biliary stents  - c/w Zosyn (1/5-) for 5-7 days per GI rec  - tolerating soft diet  - follow with Dr Tuttle as outpatient on discharge ([phone]) - RUQ pain, jaundice at admission, and fever, fulfilling charcot's triad in setting of known tumor in the CBD and recent ERCP   - s/p ERCP on 1/7 with replacement of biliary stents, LFTs continue to be downtrending  - c/w Zosyn (1/5-) for 5-7 days per GI rec  - tolerating soft diet  - follow with Dr Tuttle as outpatient on discharge ([phone]) - RUQ pain, jaundice at admission, and fever, fulfilling charcot's triad in setting of known tumor in the CBD and recent ERCP   - s/p ERCP on 1/7 with replacement of biliary stents, LFTs continue to be downtrending  - c/w Zosyn (1/5-), will need total of 14 days post first negative culture given bacteremia  - tolerating soft diet  - follow with Dr Tuttle as outpatient on discharge ([phone])

## 2019-01-09 NOTE — PROGRESS NOTE ADULT - PROBLEM SELECTOR PLAN 4
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

## 2019-01-09 NOTE — PROGRESS NOTE ADULT - ATTENDING COMMENTS
Risks, benefits, and alternatives discussed with the patient and her family.  She expressed understanding and agrees to proceed with exploratory laparotomy, whipple pancreaticoduodenectomy, possible feeding jejunostomy tube.  We spoke about the risks including but not limited to bleeding, infection, pancreatic / anastomotic leak, delayed gastric emptying.  OR Friday AM

## 2019-01-10 ENCOUNTER — TRANSCRIPTION ENCOUNTER (OUTPATIENT)
Age: 54
End: 2019-01-10

## 2019-01-10 LAB
ALBUMIN SERPL ELPH-MCNC: 3.3 G/DL — SIGNIFICANT CHANGE UP (ref 3.3–5)
ALP SERPL-CCNC: 438 U/L — HIGH (ref 40–120)
ALT FLD-CCNC: 33 U/L — SIGNIFICANT CHANGE UP (ref 4–33)
ANION GAP SERPL CALC-SCNC: 12 MEQ/L — SIGNIFICANT CHANGE UP (ref 7–14)
APPEARANCE UR: CLEAR — SIGNIFICANT CHANGE UP
APTT BLD: 33 SEC — SIGNIFICANT CHANGE UP (ref 27.5–36.3)
AST SERPL-CCNC: 38 U/L — HIGH (ref 4–32)
BACTERIA BLD CULT: SIGNIFICANT CHANGE UP
BASOPHILS # BLD AUTO: 0.07 K/UL — SIGNIFICANT CHANGE UP (ref 0–0.2)
BASOPHILS NFR BLD AUTO: 0.8 % — SIGNIFICANT CHANGE UP (ref 0–2)
BILIRUB SERPL-MCNC: 1.1 MG/DL — SIGNIFICANT CHANGE UP (ref 0.2–1.2)
BILIRUB UR-MCNC: NEGATIVE — SIGNIFICANT CHANGE UP
BLOOD UR QL VISUAL: NEGATIVE — SIGNIFICANT CHANGE UP
BUN SERPL-MCNC: 6 MG/DL — LOW (ref 7–23)
CALCIUM SERPL-MCNC: 9.7 MG/DL — SIGNIFICANT CHANGE UP (ref 8.4–10.5)
CHLORIDE SERPL-SCNC: 100 MMOL/L — SIGNIFICANT CHANGE UP (ref 98–107)
CO2 SERPL-SCNC: 28 MMOL/L — SIGNIFICANT CHANGE UP (ref 22–31)
COLOR SPEC: SIGNIFICANT CHANGE UP
CREAT SERPL-MCNC: 0.63 MG/DL — SIGNIFICANT CHANGE UP (ref 0.5–1.3)
EOSINOPHIL # BLD AUTO: 0.09 K/UL — SIGNIFICANT CHANGE UP (ref 0–0.5)
EOSINOPHIL NFR BLD AUTO: 1.1 % — SIGNIFICANT CHANGE UP (ref 0–6)
GLUCOSE SERPL-MCNC: 117 MG/DL — HIGH (ref 70–99)
GLUCOSE UR-MCNC: NEGATIVE — SIGNIFICANT CHANGE UP
HCG UR-SCNC: NEGATIVE — SIGNIFICANT CHANGE UP
HCT VFR BLD CALC: 33.3 % — LOW (ref 34.5–45)
HGB BLD-MCNC: 10.5 G/DL — LOW (ref 11.5–15.5)
IMM GRANULOCYTES NFR BLD AUTO: 1.4 % — SIGNIFICANT CHANGE UP (ref 0–1.5)
INR BLD: 1.17 — SIGNIFICANT CHANGE UP (ref 0.88–1.17)
KETONES UR-MCNC: NEGATIVE — SIGNIFICANT CHANGE UP
LEUKOCYTE ESTERASE UR-ACNC: NEGATIVE — SIGNIFICANT CHANGE UP
LYMPHOCYTES # BLD AUTO: 3.78 K/UL — HIGH (ref 1–3.3)
LYMPHOCYTES # BLD AUTO: 44.3 % — HIGH (ref 13–44)
MAGNESIUM SERPL-MCNC: 2 MG/DL — SIGNIFICANT CHANGE UP (ref 1.6–2.6)
MCHC RBC-ENTMCNC: 24.6 PG — LOW (ref 27–34)
MCHC RBC-ENTMCNC: 31.5 % — LOW (ref 32–36)
MCV RBC AUTO: 78.2 FL — LOW (ref 80–100)
MONOCYTES # BLD AUTO: 0.77 K/UL — SIGNIFICANT CHANGE UP (ref 0–0.9)
MONOCYTES NFR BLD AUTO: 9 % — SIGNIFICANT CHANGE UP (ref 2–14)
NEUTROPHILS # BLD AUTO: 3.7 K/UL — SIGNIFICANT CHANGE UP (ref 1.8–7.4)
NEUTROPHILS NFR BLD AUTO: 43.4 % — SIGNIFICANT CHANGE UP (ref 43–77)
NITRITE UR-MCNC: NEGATIVE — SIGNIFICANT CHANGE UP
NRBC # FLD: 0 K/UL — LOW (ref 25–125)
PH UR: 8 — SIGNIFICANT CHANGE UP (ref 5–8)
PHOSPHATE SERPL-MCNC: 3.8 MG/DL — SIGNIFICANT CHANGE UP (ref 2.5–4.5)
PLATELET # BLD AUTO: 284 K/UL — SIGNIFICANT CHANGE UP (ref 150–400)
PMV BLD: 10.3 FL — SIGNIFICANT CHANGE UP (ref 7–13)
POTASSIUM SERPL-MCNC: 3.7 MMOL/L — SIGNIFICANT CHANGE UP (ref 3.5–5.3)
POTASSIUM SERPL-SCNC: 3.7 MMOL/L — SIGNIFICANT CHANGE UP (ref 3.5–5.3)
PROT SERPL-MCNC: 7 G/DL — SIGNIFICANT CHANGE UP (ref 6–8.3)
PROT UR-MCNC: NEGATIVE — SIGNIFICANT CHANGE UP
PROTHROM AB SERPL-ACNC: 13.4 SEC — HIGH (ref 9.8–13.1)
RBC # BLD: 4.26 M/UL — SIGNIFICANT CHANGE UP (ref 3.8–5.2)
RBC # FLD: 15.7 % — HIGH (ref 10.3–14.5)
SODIUM SERPL-SCNC: 140 MMOL/L — SIGNIFICANT CHANGE UP (ref 135–145)
SP GR SPEC: 1.01 — SIGNIFICANT CHANGE UP (ref 1–1.04)
UROBILINOGEN FLD QL: NORMAL — SIGNIFICANT CHANGE UP
WBC # BLD: 8.53 K/UL — SIGNIFICANT CHANGE UP (ref 3.8–10.5)
WBC # FLD AUTO: 8.53 K/UL — SIGNIFICANT CHANGE UP (ref 3.8–10.5)

## 2019-01-10 RX ORDER — OMEPRAZOLE 10 MG/1
1 CAPSULE, DELAYED RELEASE ORAL
Qty: 30 | Refills: 0 | OUTPATIENT
Start: 2019-01-10 | End: 2019-02-08

## 2019-01-10 RX ORDER — DEXTROSE MONOHYDRATE, SODIUM CHLORIDE, AND POTASSIUM CHLORIDE 50; .745; 4.5 G/1000ML; G/1000ML; G/1000ML
1000 INJECTION, SOLUTION INTRAVENOUS
Qty: 0 | Refills: 0 | Status: DISCONTINUED | OUTPATIENT
Start: 2019-01-10 | End: 2019-01-11

## 2019-01-10 RX ORDER — POTASSIUM CHLORIDE 20 MEQ
40 PACKET (EA) ORAL ONCE
Qty: 0 | Refills: 0 | Status: COMPLETED | OUTPATIENT
Start: 2019-01-10 | End: 2019-01-10

## 2019-01-10 RX ORDER — DEXTROSE MONOHYDRATE, SODIUM CHLORIDE, AND POTASSIUM CHLORIDE 50; .745; 4.5 G/1000ML; G/1000ML; G/1000ML
1000 INJECTION, SOLUTION INTRAVENOUS
Qty: 0 | Refills: 0 | Status: DISCONTINUED | OUTPATIENT
Start: 2019-01-10 | End: 2019-01-10

## 2019-01-10 RX ORDER — HYDROMORPHONE HYDROCHLORIDE 2 MG/ML
1 INJECTION INTRAMUSCULAR; INTRAVENOUS; SUBCUTANEOUS
Qty: 1 | Refills: 0 | OUTPATIENT
Start: 2019-01-10

## 2019-01-10 RX ADMIN — DULOXETINE HYDROCHLORIDE 60 MILLIGRAM(S): 30 CAPSULE, DELAYED RELEASE ORAL at 12:36

## 2019-01-10 RX ADMIN — PIPERACILLIN AND TAZOBACTAM 25 GRAM(S): 4; .5 INJECTION, POWDER, LYOPHILIZED, FOR SOLUTION INTRAVENOUS at 05:22

## 2019-01-10 RX ADMIN — POLYETHYLENE GLYCOL 3350 17 GRAM(S): 17 POWDER, FOR SOLUTION ORAL at 12:36

## 2019-01-10 RX ADMIN — GABAPENTIN 100 MILLIGRAM(S): 400 CAPSULE ORAL at 22:34

## 2019-01-10 RX ADMIN — PIPERACILLIN AND TAZOBACTAM 25 GRAM(S): 4; .5 INJECTION, POWDER, LYOPHILIZED, FOR SOLUTION INTRAVENOUS at 12:37

## 2019-01-10 RX ADMIN — GABAPENTIN 100 MILLIGRAM(S): 400 CAPSULE ORAL at 05:22

## 2019-01-10 RX ADMIN — Medication 1: at 13:35

## 2019-01-10 RX ADMIN — ENOXAPARIN SODIUM 40 MILLIGRAM(S): 100 INJECTION SUBCUTANEOUS at 12:35

## 2019-01-10 RX ADMIN — GABAPENTIN 100 MILLIGRAM(S): 400 CAPSULE ORAL at 13:35

## 2019-01-10 RX ADMIN — PIPERACILLIN AND TAZOBACTAM 25 GRAM(S): 4; .5 INJECTION, POWDER, LYOPHILIZED, FOR SOLUTION INTRAVENOUS at 20:35

## 2019-01-10 RX ADMIN — Medication 40 MILLIEQUIVALENT(S): at 12:36

## 2019-01-10 RX ADMIN — SENNA PLUS 2 TABLET(S): 8.6 TABLET ORAL at 22:34

## 2019-01-10 NOTE — CHART NOTE - NSCHARTNOTEFT_GEN_A_CORE
D  Team Surgery Preop Note    Patient is a 53y old  Female who presents with a chief complaint of cholangitis (10 Jameel 2019 00:24)    Diagnosis: pancreatic ca  Procedure: dx lap, ex lap, pancreaticoduodenectomy, feeding jejunostomy  Surgeon: Steffen Fitzpatrick   8.53  )-----------( 284      ( 10 Jameel 2019 06:45 )             33.3     01-10    140  |  100  |  6<L>  ----------------------------<  117<H>  3.7   |  28  |  0.63    Ca    9.7      10 Jameel 2019 06:45  Phos  3.8     01-10  Mg     2.0     01-10    TPro  7.0  /  Alb  3.3  /  TBili  1.1  /  DBili  x   /  AST  38<H>  /  ALT  33  /  AlkPhos  438<H>  01-10    PT/INR - ( 10 Jameel 2019 11:11 )   PT: 13.4 SEC;   INR: 1.17          PTT - ( 10 Jameel 2019 11:11 )  PTT:33.0 SEC    Type + Screen (01.07.19 @ 06:00)    ABO Interpretation: B    Rh Interpretation: Positive    Antibody Screen: Negative      < from: 12 Lead ECG (01.05.19 @ 06:18) >      Ventricular Rate 102 BPM    Atrial Rate 102 BPM    P-R Interval 136 ms    QRS Duration 74 ms    Q-T Interval 346 ms    QTC Calculation(Bezet) 450 ms    P Axis 59 degrees      < end of copied text >      R Axis 14 degrees    T Axis 25 degrees    Diagnosis Line Sinus tachycardia  Low voltage QRS  Borderline ECG    < from: Xray Chest 1 View AP/PA (01.05.19 @ 05:42) >    FINDINGS:  Heart size normal.    Thelungs are clear.    No subdiaphragmatic free air.    < end of copied text >      A/P: 53F for OR whipple procedure tomorrow am  [ ] Type & Screen-repeat in am  [ ] Urinalysis pending  [ ] Urine preg pending  [ ] NPO/IVF pMN  [ ] Consent to be obtained

## 2019-01-10 NOTE — PROGRESS NOTE ADULT - SUBJECTIVE AND OBJECTIVE BOX
Surgical Oncology Progress Note/Transfer Note    Subjective: Patient seen and examined. No complaints, not having any abdominal pain.  Tolerating a soft diet without N/V.        Objective:       Vital Signs Last 24 Hrs  T(C): 36.5 (09 Jan 2019 23:31), Max: 36.8 (09 Jan 2019 22:26)  T(F): 97.7 (09 Jan 2019 23:31), Max: 98.2 (09 Jan 2019 22:26)  HR: 60 (09 Jan 2019 23:31) (51 - 64)  BP: 132/69 (09 Jan 2019 23:31) (129/82 - 134/87)  BP(mean): --  RR: 18 (09 Jan 2019 23:31) (17 - 18)  SpO2: 100% (09 Jan 2019 23:31) (97% - 100%)    Physical Exam:  General: Well developed, well nourished, No Acute Distress, uncomfortable  HEENT: Normocephalic Atraumatic, EOMI bl  Neurology: A&Ox3  Abdominal: Soft, RUQ tenderness, mild epigastric tenderness, moderately distended  Extremities: No Clubbing, cyanosis or edema, FROM  Skin: warm, dry, normal color, no rash or abnormal lesions    LABS:                        9.6    10.10 )-----------( 238      ( 09 Jan 2019 06:04 )             29.8     01-09    141  |  103  |  7   ----------------------------<  125<H>  3.6   |  27  |  0.68    Ca    9.0      09 Jan 2019 06:04  Phos  3.4     01-09  Mg     2.0     01-09    TPro  7.0  /  Alb  3.2<L>  /  TBili  1.1  /  DBili  x   /  AST  36<H>  /  ALT  32  /  AlkPhos  421<H>  01-09    LIVER FUNCTIONS - ( 09 Jan 2019 06:04 )  Alb: 3.2 g/dL / Pro: 7.0 g/dL / ALK PHOS: 421 u/L / ALT: 32 u/L / AST: 36 u/L / GGT: x             Imaging:   < from: MR Abdomen w/ IV Cont (01.08.19 @ 10:17) >  IMPRESSION:   *  No suspicious liver lesions. No abnormality is identified to   correspond with questionable lesion identified on prior CT.  *  Intra and extrahepatic biliary ductal dilation, with mural enhancement   which may be seen in the setting of cholangitis.  *  Cholelithiasis with gallbladder wall thickening.      < end of copied text >

## 2019-01-11 ENCOUNTER — RESULT REVIEW (OUTPATIENT)
Age: 54
End: 2019-01-11

## 2019-01-11 ENCOUNTER — APPOINTMENT (OUTPATIENT)
Dept: SURGICAL ONCOLOGY | Facility: HOSPITAL | Age: 54
End: 2019-01-11

## 2019-01-11 LAB
ALBUMIN SERPL ELPH-MCNC: 3.1 G/DL — LOW (ref 3.3–5)
ALBUMIN SERPL ELPH-MCNC: 3.7 G/DL — SIGNIFICANT CHANGE UP (ref 3.3–5)
ALP SERPL-CCNC: 368 U/L — HIGH (ref 40–120)
ALP SERPL-CCNC: 661 U/L — HIGH (ref 40–120)
ALT FLD-CCNC: 49 U/L — HIGH (ref 4–33)
ALT FLD-CCNC: 74 U/L — HIGH (ref 4–33)
ANION GAP SERPL CALC-SCNC: 12 MEQ/L — SIGNIFICANT CHANGE UP (ref 7–14)
ANION GAP SERPL CALC-SCNC: 13 MEQ/L — SIGNIFICANT CHANGE UP (ref 7–14)
APTT BLD: 31.9 SEC — SIGNIFICANT CHANGE UP (ref 27.5–36.3)
AST SERPL-CCNC: 146 U/L — HIGH (ref 4–32)
AST SERPL-CCNC: 90 U/L — HIGH (ref 4–32)
BASE EXCESS BLDA CALC-SCNC: -0.7 MMOL/L — SIGNIFICANT CHANGE UP
BASE EXCESS BLDA CALC-SCNC: -1.6 MMOL/L — SIGNIFICANT CHANGE UP
BASE EXCESS BLDA CALC-SCNC: -2.7 MMOL/L — SIGNIFICANT CHANGE UP
BASE EXCESS BLDA CALC-SCNC: -3.1 MMOL/L — SIGNIFICANT CHANGE UP
BILIRUB SERPL-MCNC: 2.1 MG/DL — HIGH (ref 0.2–1.2)
BILIRUB SERPL-MCNC: 2.1 MG/DL — HIGH (ref 0.2–1.2)
BLD GP AB SCN SERPL QL: NEGATIVE — SIGNIFICANT CHANGE UP
BUN SERPL-MCNC: 5 MG/DL — LOW (ref 7–23)
BUN SERPL-MCNC: 6 MG/DL — LOW (ref 7–23)
CA-I BLDA-SCNC: 1.13 MMOL/L — LOW (ref 1.15–1.29)
CA-I BLDA-SCNC: 1.17 MMOL/L — SIGNIFICANT CHANGE UP (ref 1.15–1.29)
CA-I BLDA-SCNC: 1.22 MMOL/L — SIGNIFICANT CHANGE UP (ref 1.15–1.29)
CA-I BLDA-SCNC: 1.26 MMOL/L — SIGNIFICANT CHANGE UP (ref 1.15–1.29)
CALCIUM SERPL-MCNC: 8.2 MG/DL — LOW (ref 8.4–10.5)
CALCIUM SERPL-MCNC: 9.3 MG/DL — SIGNIFICANT CHANGE UP (ref 8.4–10.5)
CHLORIDE SERPL-SCNC: 100 MMOL/L — SIGNIFICANT CHANGE UP (ref 98–107)
CHLORIDE SERPL-SCNC: 104 MMOL/L — SIGNIFICANT CHANGE UP (ref 98–107)
CO2 SERPL-SCNC: 20 MMOL/L — LOW (ref 22–31)
CO2 SERPL-SCNC: 23 MMOL/L — SIGNIFICANT CHANGE UP (ref 22–31)
CREAT SERPL-MCNC: 0.61 MG/DL — SIGNIFICANT CHANGE UP (ref 0.5–1.3)
CREAT SERPL-MCNC: 0.69 MG/DL — SIGNIFICANT CHANGE UP (ref 0.5–1.3)
GLUCOSE BLDA-MCNC: 124 MG/DL — HIGH (ref 70–99)
GLUCOSE BLDA-MCNC: 153 MG/DL — HIGH (ref 70–99)
GLUCOSE BLDA-MCNC: 202 MG/DL — HIGH (ref 70–99)
GLUCOSE BLDA-MCNC: 246 MG/DL — HIGH (ref 70–99)
GLUCOSE SERPL-MCNC: 119 MG/DL — HIGH (ref 70–99)
GLUCOSE SERPL-MCNC: 177 MG/DL — HIGH (ref 70–99)
HCO3 BLDA-SCNC: 22 MMOL/L — SIGNIFICANT CHANGE UP (ref 22–26)
HCO3 BLDA-SCNC: 22 MMOL/L — SIGNIFICANT CHANGE UP (ref 22–26)
HCO3 BLDA-SCNC: 23 MMOL/L — SIGNIFICANT CHANGE UP (ref 22–26)
HCO3 BLDA-SCNC: 24 MMOL/L — SIGNIFICANT CHANGE UP (ref 22–26)
HCT VFR BLD CALC: 26.7 % — LOW (ref 34.5–45)
HCT VFR BLD CALC: 36 % — SIGNIFICANT CHANGE UP (ref 34.5–45)
HCT VFR BLDA CALC: 28.6 % — LOW (ref 34.5–46.5)
HCT VFR BLDA CALC: 29.8 % — LOW (ref 34.5–46.5)
HCT VFR BLDA CALC: 32.6 % — LOW (ref 34.5–46.5)
HCT VFR BLDA CALC: 33.1 % — LOW (ref 34.5–46.5)
HGB BLD-MCNC: 11.2 G/DL — LOW (ref 11.5–15.5)
HGB BLD-MCNC: 8.5 G/DL — LOW (ref 11.5–15.5)
HGB BLDA-MCNC: 10.6 G/DL — LOW (ref 11.5–15.5)
HGB BLDA-MCNC: 10.7 G/DL — LOW (ref 11.5–15.5)
HGB BLDA-MCNC: 9.2 G/DL — LOW (ref 11.5–15.5)
HGB BLDA-MCNC: 9.6 G/DL — LOW (ref 11.5–15.5)
INR BLD: 1.18 — HIGH (ref 0.88–1.17)
MAGNESIUM SERPL-MCNC: 2.1 MG/DL — SIGNIFICANT CHANGE UP (ref 1.6–2.6)
MCHC RBC-ENTMCNC: 24.6 PG — LOW (ref 27–34)
MCHC RBC-ENTMCNC: 24.9 PG — LOW (ref 27–34)
MCHC RBC-ENTMCNC: 31.1 % — LOW (ref 32–36)
MCHC RBC-ENTMCNC: 31.8 % — LOW (ref 32–36)
MCV RBC AUTO: 78.1 FL — LOW (ref 80–100)
MCV RBC AUTO: 78.9 FL — LOW (ref 80–100)
NRBC # FLD: 0 K/UL — LOW (ref 25–125)
NRBC # FLD: 0 K/UL — LOW (ref 25–125)
PCO2 BLDA: 33 MMHG — SIGNIFICANT CHANGE UP (ref 32–48)
PCO2 BLDA: 38 MMHG — SIGNIFICANT CHANGE UP (ref 32–48)
PCO2 BLDA: 42 MMHG — SIGNIFICANT CHANGE UP (ref 32–48)
PCO2 BLDA: 42 MMHG — SIGNIFICANT CHANGE UP (ref 32–48)
PH BLDA: 7.34 PH — LOW (ref 7.35–7.45)
PH BLDA: 7.36 PH — SIGNIFICANT CHANGE UP (ref 7.35–7.45)
PH BLDA: 7.37 PH — SIGNIFICANT CHANGE UP (ref 7.35–7.45)
PH BLDA: 7.45 PH — SIGNIFICANT CHANGE UP (ref 7.35–7.45)
PHOSPHATE SERPL-MCNC: 3.2 MG/DL — SIGNIFICANT CHANGE UP (ref 2.5–4.5)
PLATELET # BLD AUTO: 211 K/UL — SIGNIFICANT CHANGE UP (ref 150–400)
PLATELET # BLD AUTO: 298 K/UL — SIGNIFICANT CHANGE UP (ref 150–400)
PMV BLD: 10.1 FL — SIGNIFICANT CHANGE UP (ref 7–13)
PMV BLD: 10.8 FL — SIGNIFICANT CHANGE UP (ref 7–13)
PO2 BLDA: 371 MMHG — HIGH (ref 83–108)
PO2 BLDA: 375 MMHG — HIGH (ref 83–108)
PO2 BLDA: 387 MMHG — HIGH (ref 83–108)
PO2 BLDA: 403 MMHG — HIGH (ref 83–108)
POTASSIUM BLDA-SCNC: 3.1 MMOL/L — LOW (ref 3.4–4.5)
POTASSIUM BLDA-SCNC: 3.5 MMOL/L — SIGNIFICANT CHANGE UP (ref 3.4–4.5)
POTASSIUM BLDA-SCNC: 3.7 MMOL/L — SIGNIFICANT CHANGE UP (ref 3.4–4.5)
POTASSIUM BLDA-SCNC: 5 MMOL/L — HIGH (ref 3.4–4.5)
POTASSIUM SERPL-MCNC: 4.1 MMOL/L — SIGNIFICANT CHANGE UP (ref 3.5–5.3)
POTASSIUM SERPL-MCNC: 4.3 MMOL/L — SIGNIFICANT CHANGE UP (ref 3.5–5.3)
POTASSIUM SERPL-SCNC: 4.1 MMOL/L — SIGNIFICANT CHANGE UP (ref 3.5–5.3)
POTASSIUM SERPL-SCNC: 4.3 MMOL/L — SIGNIFICANT CHANGE UP (ref 3.5–5.3)
PROT SERPL-MCNC: 5.6 G/DL — LOW (ref 6–8.3)
PROT SERPL-MCNC: 7.6 G/DL — SIGNIFICANT CHANGE UP (ref 6–8.3)
PROTHROM AB SERPL-ACNC: 13.5 SEC — HIGH (ref 9.8–13.1)
RBC # BLD: 3.42 M/UL — LOW (ref 3.8–5.2)
RBC # BLD: 4.56 M/UL — SIGNIFICANT CHANGE UP (ref 3.8–5.2)
RBC # FLD: 15.8 % — HIGH (ref 10.3–14.5)
RBC # FLD: 16.1 % — HIGH (ref 10.3–14.5)
RH IG SCN BLD-IMP: POSITIVE — SIGNIFICANT CHANGE UP
SAO2 % BLDA: 98.3 % — SIGNIFICANT CHANGE UP (ref 95–99)
SAO2 % BLDA: 98.5 % — SIGNIFICANT CHANGE UP (ref 95–99)
SAO2 % BLDA: 98.5 % — SIGNIFICANT CHANGE UP (ref 95–99)
SAO2 % BLDA: 99.9 % — HIGH (ref 95–99)
SODIUM BLDA-SCNC: 134 MMOL/L — LOW (ref 136–146)
SODIUM BLDA-SCNC: 134 MMOL/L — LOW (ref 136–146)
SODIUM BLDA-SCNC: 135 MMOL/L — LOW (ref 136–146)
SODIUM BLDA-SCNC: 136 MMOL/L — SIGNIFICANT CHANGE UP (ref 136–146)
SODIUM SERPL-SCNC: 135 MMOL/L — SIGNIFICANT CHANGE UP (ref 135–145)
SODIUM SERPL-SCNC: 137 MMOL/L — SIGNIFICANT CHANGE UP (ref 135–145)
WBC # BLD: 12.75 K/UL — HIGH (ref 3.8–10.5)
WBC # BLD: 9.38 K/UL — SIGNIFICANT CHANGE UP (ref 3.8–10.5)
WBC # FLD AUTO: 12.75 K/UL — HIGH (ref 3.8–10.5)
WBC # FLD AUTO: 9.38 K/UL — SIGNIFICANT CHANGE UP (ref 3.8–10.5)

## 2019-01-11 PROCEDURE — 88305 TISSUE EXAM BY PATHOLOGIST: CPT | Mod: 26

## 2019-01-11 PROCEDURE — 38747 REMOVE ABDOMINAL LYMPH NODES: CPT | Mod: 59

## 2019-01-11 PROCEDURE — 49402 REMOVE FOREIGN BODY ADBOMEN: CPT

## 2019-01-11 PROCEDURE — 44050 REDUCE BOWEL OBSTRUCTION: CPT

## 2019-01-11 PROCEDURE — 88307 TISSUE EXAM BY PATHOLOGIST: CPT | Mod: 26

## 2019-01-11 PROCEDURE — 49020 DRAINAGE ABDOM ABSCESS OPEN: CPT | Mod: 59

## 2019-01-11 PROCEDURE — 47420 INCISION OF BILE DUCT: CPT

## 2019-01-11 PROCEDURE — 88300 SURGICAL PATH GROSS: CPT | Mod: 26

## 2019-01-11 PROCEDURE — 49905 OMENTAL FLAP INTRA-ABDOM: CPT | Mod: 82

## 2019-01-11 PROCEDURE — 38747 REMOVE ABDOMINAL LYMPH NODES: CPT | Mod: 82,59

## 2019-01-11 PROCEDURE — 97606 NEG PRS WND THER DME>50 SQCM: CPT

## 2019-01-11 PROCEDURE — 88331 PATH CONSLTJ SURG 1 BLK 1SPC: CPT | Mod: 26

## 2019-01-11 PROCEDURE — 35840 EXPLORE ABDOMINAL VESSELS: CPT

## 2019-01-11 PROCEDURE — 88304 TISSUE EXAM BY PATHOLOGIST: CPT | Mod: 26

## 2019-01-11 PROCEDURE — 48150 PARTIAL REMOVAL OF PANCREAS: CPT | Mod: 82

## 2019-01-11 PROCEDURE — 48150 PARTIAL REMOVAL OF PANCREAS: CPT

## 2019-01-11 PROCEDURE — 47420 INCISION OF BILE DUCT: CPT | Mod: 82

## 2019-01-11 PROCEDURE — 88309 TISSUE EXAM BY PATHOLOGIST: CPT | Mod: 26

## 2019-01-11 PROCEDURE — 49905 OMENTAL FLAP INTRA-ABDOM: CPT

## 2019-01-11 RX ORDER — ALBUMIN HUMAN 25 %
250 VIAL (ML) INTRAVENOUS
Qty: 0 | Refills: 0 | Status: COMPLETED | OUTPATIENT
Start: 2019-01-11 | End: 2019-01-11

## 2019-01-11 RX ORDER — DEXTROSE 50 % IN WATER 50 %
25 SYRINGE (ML) INTRAVENOUS ONCE
Qty: 0 | Refills: 0 | Status: DISCONTINUED | OUTPATIENT
Start: 2019-01-11 | End: 2019-01-17

## 2019-01-11 RX ORDER — GLUCAGON INJECTION, SOLUTION 0.5 MG/.1ML
1 INJECTION, SOLUTION SUBCUTANEOUS ONCE
Qty: 0 | Refills: 0 | Status: DISCONTINUED | OUTPATIENT
Start: 2019-01-11 | End: 2019-01-17

## 2019-01-11 RX ORDER — INSULIN LISPRO 100/ML
VIAL (ML) SUBCUTANEOUS EVERY 6 HOURS
Qty: 0 | Refills: 0 | Status: DISCONTINUED | OUTPATIENT
Start: 2019-01-11 | End: 2019-01-17

## 2019-01-11 RX ORDER — PANTOPRAZOLE SODIUM 20 MG/1
40 TABLET, DELAYED RELEASE ORAL DAILY
Qty: 0 | Refills: 0 | Status: DISCONTINUED | OUTPATIENT
Start: 2019-01-11 | End: 2019-01-27

## 2019-01-11 RX ORDER — SODIUM CHLORIDE 9 MG/ML
1000 INJECTION, SOLUTION INTRAVENOUS
Qty: 0 | Refills: 0 | Status: DISCONTINUED | OUTPATIENT
Start: 2019-01-11 | End: 2019-01-17

## 2019-01-11 RX ORDER — ALBUMIN HUMAN 25 %
250 VIAL (ML) INTRAVENOUS ONCE
Qty: 0 | Refills: 0 | Status: COMPLETED | OUTPATIENT
Start: 2019-01-11 | End: 2019-01-11

## 2019-01-11 RX ORDER — SODIUM CHLORIDE 9 MG/ML
1000 INJECTION, SOLUTION INTRAVENOUS
Qty: 0 | Refills: 0 | Status: DISCONTINUED | OUTPATIENT
Start: 2019-01-11 | End: 2019-01-12

## 2019-01-11 RX ORDER — ONDANSETRON 8 MG/1
4 TABLET, FILM COATED ORAL EVERY 6 HOURS
Qty: 0 | Refills: 0 | Status: DISCONTINUED | OUTPATIENT
Start: 2019-01-11 | End: 2019-01-12

## 2019-01-11 RX ORDER — ONDANSETRON 8 MG/1
4 TABLET, FILM COATED ORAL ONCE
Qty: 0 | Refills: 0 | Status: DISCONTINUED | OUTPATIENT
Start: 2019-01-11 | End: 2019-01-12

## 2019-01-11 RX ORDER — HYDROMORPHONE HYDROCHLORIDE 2 MG/ML
0.5 INJECTION INTRAMUSCULAR; INTRAVENOUS; SUBCUTANEOUS
Qty: 0 | Refills: 0 | Status: DISCONTINUED | OUTPATIENT
Start: 2019-01-11 | End: 2019-01-12

## 2019-01-11 RX ORDER — DEXTROSE 50 % IN WATER 50 %
12.5 SYRINGE (ML) INTRAVENOUS ONCE
Qty: 0 | Refills: 0 | Status: DISCONTINUED | OUTPATIENT
Start: 2019-01-11 | End: 2019-01-17

## 2019-01-11 RX ORDER — NALOXONE HYDROCHLORIDE 4 MG/.1ML
0.1 SPRAY NASAL
Qty: 0 | Refills: 0 | Status: DISCONTINUED | OUTPATIENT
Start: 2019-01-11 | End: 2019-01-12

## 2019-01-11 RX ORDER — DEXTROSE 50 % IN WATER 50 %
15 SYRINGE (ML) INTRAVENOUS ONCE
Qty: 0 | Refills: 0 | Status: DISCONTINUED | OUTPATIENT
Start: 2019-01-11 | End: 2019-01-17

## 2019-01-11 RX ORDER — HEPARIN SODIUM 5000 [USP'U]/ML
5000 INJECTION INTRAVENOUS; SUBCUTANEOUS EVERY 8 HOURS
Qty: 0 | Refills: 0 | Status: DISCONTINUED | OUTPATIENT
Start: 2019-01-11 | End: 2019-01-19

## 2019-01-11 RX ORDER — HYDROMORPHONE HYDROCHLORIDE 2 MG/ML
1 INJECTION INTRAMUSCULAR; INTRAVENOUS; SUBCUTANEOUS
Qty: 0 | Refills: 0 | Status: DISCONTINUED | OUTPATIENT
Start: 2019-01-11 | End: 2019-01-12

## 2019-01-11 RX ADMIN — PIPERACILLIN AND TAZOBACTAM 25 GRAM(S): 4; .5 INJECTION, POWDER, LYOPHILIZED, FOR SOLUTION INTRAVENOUS at 04:28

## 2019-01-11 RX ADMIN — Medication 500 MILLILITER(S): at 22:11

## 2019-01-11 RX ADMIN — Medication 750 MILLILITER(S): at 18:58

## 2019-01-11 RX ADMIN — SODIUM CHLORIDE 100 MILLILITER(S): 9 INJECTION, SOLUTION INTRAVENOUS at 19:23

## 2019-01-11 RX ADMIN — HEPARIN SODIUM 5000 UNIT(S): 5000 INJECTION INTRAVENOUS; SUBCUTANEOUS at 23:09

## 2019-01-11 RX ADMIN — Medication 2: at 23:51

## 2019-01-11 RX ADMIN — GABAPENTIN 100 MILLIGRAM(S): 400 CAPSULE ORAL at 05:11

## 2019-01-11 RX ADMIN — DEXTROSE MONOHYDRATE, SODIUM CHLORIDE, AND POTASSIUM CHLORIDE 50 MILLILITER(S): 50; .745; 4.5 INJECTION, SOLUTION INTRAVENOUS at 00:14

## 2019-01-11 RX ADMIN — Medication 2: at 19:22

## 2019-01-11 RX ADMIN — Medication 500 MILLILITER(S): at 21:00

## 2019-01-11 NOTE — BRIEF OPERATIVE NOTE - PROCEDURE
<<-----Click on this checkbox to enter Procedure Pancreaticoduodenectomy  01/11/2019    Active  [id number]

## 2019-01-11 NOTE — PROGRESS NOTE ADULT - ASSESSMENT
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 bili s/p ERCP and stent exchange.  LFTs downtrending, no evidence of liver lesion on MRI.      Plan:  - OR for Whipple today  - Post op check      Surgical Oncology (D Team)  p. [phone]

## 2019-01-11 NOTE — PROGRESS NOTE ADULT - SUBJECTIVE AND OBJECTIVE BOX
Surgical Oncology Progress Note/Transfer Note    Subjective: Patient seen and examined. No complaints, not having any abdominal pain.  Tolerating a soft diet without N/V.        Objective:       Vital Signs (24 Hrs):  T(C): 37 (19 @ 07:17), Max: 37 (19 @ 07:17)  HR: 72 (19 @ 07:17) (64 - 85)  BP: 109/67 (19 @ 07:17) (109/67 - 130/73)  RR: 16 (19 @ 07:17) (16 - 18)  SpO2: 98% (19 @ 07:17) (97% - 99%)  Wt(kg): --  Daily Height in cm: 152.4 (2019 07:20)    Daily     I&O's Summary    10 Jameel 2019 07:01  -  2019 07:00  --------------------------------------------------------  IN: 750 mL / OUT: 1200 mL / NET: -450 mL        Physical Exam:  General: Well developed, well nourished, No Acute Distress, uncomfortable  HEENT: Normocephalic Atraumatic, EOMI bl  Neurology: A&Ox3  Abdominal: Soft, RUQ tenderness, mild epigastric tenderness, moderately distended  Extremities: No Clubbing, cyanosis or edema, FROM  Skin: warm, dry, normal color, no rash or abnormal lesions    LABS:                          11.2   12.75 )-----------( 298      ( 2019 00:20 )             36.0           135  |  100  |  6<L>  ----------------------------<  177<H>  4.1   |  23  |  0.69    Ca    9.3      2019 05:57  Phos  3.2       Mg     2.1         TPro  7.6  /  Alb  3.7  /  TBili  2.1<H>  /  DBili  x   /  AST  90<H>  /  ALT  49<H>  /  AlkPhos  661<H>        PT/INR - ( 2019 05:57 )   PT: 13.5 SEC;   INR: 1.18          PTT - ( 2019 05:57 )  PTT:31.9 SEC        ( 2019 11:32 ) pH: 7.36  /  pCO2: 42    /  pO2: 371   / HCO3: 23    / Base Excess: -1.6  /  SaO2: 98.5            ( 2019 09:25 ) pH: 7.45  /  pCO2: 33    /  pO2: 387   / HCO3: 24    / Base Excess: -0.7  /  SaO2: 98.3                  Urinalysis Basic - ( 10 Jameel 2019 14:05 )    Color: LIGHT YELLOW / Appearance: CLEAR / S.012 / pH: 8.0  Gluc: NEGATIVE / Ketone: NEGATIVE  / Bili: NEGATIVE / Urobili: NORMAL   Blood: NEGATIVE / Protein: NEGATIVE / Nitrite: NEGATIVE   Leuk Esterase: NEGATIVE / RBC: x / WBC x   Sq Epi: x / Non Sq Epi: x / Bacteria: x

## 2019-01-11 NOTE — CHART NOTE - NSCHARTNOTEFT_GEN_A_CORE
54F POD 0 s/p pancreaticoduodenectomy. Pt seen resting in PACU. NGT and sinha intact. PT responsive to verbal but sleepy.    Vital Signs (24 Hrs):  T(C): 36.9 (19 @ 19:00), Max: 37 (19 @ 07:17)  HR: 82 (19 @ 19:30) (72 - 85)  BP: 111/59 (19 @ 19:30) (80/42 - 121/65)  RR: 12 (19 @ 19:30) (12 - 23)  SpO2: 96% (19 @ 19:30) (95% - 100%)  Wt(kg): --  Daily Height in cm: 152.4 (2019 07:20)    Daily     I&O's Summary    10 Jameel 2019 07:  -  2019 07:00  --------------------------------------------------------  IN: 750 mL / OUT: 1200 mL / NET: -450 mL    2019 07:  -  2019 20:38  --------------------------------------------------------  IN: 650 mL / OUT: 575 mL / NET: 75 mL      PE:   Oriented to verbal, drowsy  NGT intact under suction draining brown fluid  Whitakers intact  Midline abdominal dressing clean and dry with wound vac functioning  JOURDAN drain from R abdomen draining serosanguinous fluid  Sinha present with urine output.                           8.5    9.38  )-----------( 211      ( 2019 16:10 )             26.7           137  |  104  |  5<L>  ----------------------------<  119<H>  4.3   |  20<L>  |  0.61    Ca    8.2<L>      2019 16:10  Phos  3.2       Mg     2.1         TPro  5.6<L>  /  Alb  3.1<L>  /  TBili  2.1<H>  /  DBili  x   /  AST  146<H>  /  ALT  74<H>  /  AlkPhos  368<H>        PT/INR - ( 2019 05:57 )   PT: 13.5 SEC;   INR: 1.18          PTT - ( 2019 05:57 )  PTT:31.9 SEC        ( 2019 14:42 ) pH: 7.37  /  pCO2: 38    /  pO2: 403   / HCO3: 22    / Base Excess: -3.1  /  SaO2: 98.5            ( 2019 12:53 ) pH: 7.34  /  pCO2: 42    /  pO2: 375   / HCO3: 22    / Base Excess: -2.7  /  SaO2: 99.9            ( 2019 11:32 ) pH: 7.36  /  pCO2: 42    /  pO2: 371   / HCO3: 23    / Base Excess: -1.6  /  SaO2: 98.5                  Urinalysis Basic - ( 10 Jameel 2019 14:05 )    Color: LIGHT YELLOW / Appearance: CLEAR / S.012 / pH: 8.0  Gluc: NEGATIVE / Ketone: NEGATIVE  / Bili: NEGATIVE / Urobili: NORMAL   Blood: NEGATIVE / Protein: NEGATIVE / Nitrite: NEGATIVE   Leuk Esterase: NEGATIVE / RBC: x / WBC x   Sq Epi: x / Non Sq Epi: x / Bacteria: x          A/P: 54F POD 0 s/p pancreaticoduodenectomy progressing well in PACU  - Plan to go to floor  - NGT and Sinha remaining   - NPO   - Wound vac for midline abd dressing  - JOURDAN drain  - Monitor labs  - Monitor bowel function

## 2019-01-12 LAB
ALBUMIN SERPL ELPH-MCNC: 3.8 G/DL — SIGNIFICANT CHANGE UP (ref 3.3–5)
ALP SERPL-CCNC: 282 U/L — HIGH (ref 40–120)
ALT FLD-CCNC: 79 U/L — HIGH (ref 4–33)
ANION GAP SERPL CALC-SCNC: 11 MEQ/L — SIGNIFICANT CHANGE UP (ref 7–14)
APTT BLD: 40.1 SEC — HIGH (ref 27.5–36.3)
AST SERPL-CCNC: 141 U/L — HIGH (ref 4–32)
BACTERIA BLD CULT: SIGNIFICANT CHANGE UP
BACTERIA BLD CULT: SIGNIFICANT CHANGE UP
BILIRUB SERPL-MCNC: 1.6 MG/DL — HIGH (ref 0.2–1.2)
BUN SERPL-MCNC: 7 MG/DL — SIGNIFICANT CHANGE UP (ref 7–23)
CALCIUM SERPL-MCNC: 8.6 MG/DL — SIGNIFICANT CHANGE UP (ref 8.4–10.5)
CHLORIDE SERPL-SCNC: 99 MMOL/L — SIGNIFICANT CHANGE UP (ref 98–107)
CO2 SERPL-SCNC: 26 MMOL/L — SIGNIFICANT CHANGE UP (ref 22–31)
CREAT SERPL-MCNC: 0.6 MG/DL — SIGNIFICANT CHANGE UP (ref 0.5–1.3)
GLUCOSE SERPL-MCNC: 157 MG/DL — HIGH (ref 70–99)
HCT VFR BLD CALC: 24.5 % — LOW (ref 34.5–45)
HGB BLD-MCNC: 7.9 G/DL — LOW (ref 11.5–15.5)
INR BLD: 2.2 — HIGH (ref 0.88–1.17)
MAGNESIUM SERPL-MCNC: 1.6 MG/DL — SIGNIFICANT CHANGE UP (ref 1.6–2.6)
MCHC RBC-ENTMCNC: 25 PG — LOW (ref 27–34)
MCHC RBC-ENTMCNC: 32.2 % — SIGNIFICANT CHANGE UP (ref 32–36)
MCV RBC AUTO: 77.5 FL — LOW (ref 80–100)
NRBC # FLD: 0 K/UL — LOW (ref 25–125)
PHOSPHATE SERPL-MCNC: 3 MG/DL — SIGNIFICANT CHANGE UP (ref 2.5–4.5)
PLATELET # BLD AUTO: 212 K/UL — SIGNIFICANT CHANGE UP (ref 150–400)
PMV BLD: 10.8 FL — SIGNIFICANT CHANGE UP (ref 7–13)
POTASSIUM SERPL-MCNC: 3.8 MMOL/L — SIGNIFICANT CHANGE UP (ref 3.5–5.3)
POTASSIUM SERPL-SCNC: 3.8 MMOL/L — SIGNIFICANT CHANGE UP (ref 3.5–5.3)
PROT SERPL-MCNC: 6.2 G/DL — SIGNIFICANT CHANGE UP (ref 6–8.3)
PROTHROM AB SERPL-ACNC: 25 SEC — HIGH (ref 9.8–13.1)
RBC # BLD: 3.16 M/UL — LOW (ref 3.8–5.2)
RBC # FLD: 15.9 % — HIGH (ref 10.3–14.5)
SODIUM SERPL-SCNC: 136 MMOL/L — SIGNIFICANT CHANGE UP (ref 135–145)
WBC # BLD: 11.32 K/UL — HIGH (ref 3.8–10.5)
WBC # FLD AUTO: 11.32 K/UL — HIGH (ref 3.8–10.5)

## 2019-01-12 RX ORDER — ACETAMINOPHEN 500 MG
1000 TABLET ORAL ONCE
Qty: 0 | Refills: 0 | Status: DISCONTINUED | OUTPATIENT
Start: 2019-01-12 | End: 2019-01-12

## 2019-01-12 RX ORDER — POTASSIUM CHLORIDE 20 MEQ
10 PACKET (EA) ORAL
Qty: 0 | Refills: 0 | Status: COMPLETED | OUTPATIENT
Start: 2019-01-12 | End: 2019-01-12

## 2019-01-12 RX ORDER — TETRACAINE/BENZOCAINE/BUTAMBEN 2%-14%-2%
1 OINTMENT (GRAM) TOPICAL ONCE
Qty: 0 | Refills: 0 | Status: COMPLETED | OUTPATIENT
Start: 2019-01-12 | End: 2019-01-12

## 2019-01-12 RX ORDER — ACETAMINOPHEN 500 MG
850 TABLET ORAL ONCE
Qty: 0 | Refills: 0 | Status: COMPLETED | OUTPATIENT
Start: 2019-01-13 | End: 2019-01-13

## 2019-01-12 RX ORDER — HYDROMORPHONE HYDROCHLORIDE 2 MG/ML
30 INJECTION INTRAMUSCULAR; INTRAVENOUS; SUBCUTANEOUS
Qty: 0 | Refills: 0 | Status: DISCONTINUED | OUTPATIENT
Start: 2019-01-12 | End: 2019-01-15

## 2019-01-12 RX ORDER — ACETAMINOPHEN 500 MG
850 TABLET ORAL ONCE
Qty: 0 | Refills: 0 | Status: DISCONTINUED | OUTPATIENT
Start: 2019-01-12 | End: 2019-01-12

## 2019-01-12 RX ORDER — MAGNESIUM SULFATE 500 MG/ML
2 VIAL (ML) INJECTION ONCE
Qty: 0 | Refills: 0 | Status: COMPLETED | OUTPATIENT
Start: 2019-01-12 | End: 2019-01-12

## 2019-01-12 RX ORDER — ACETAMINOPHEN 500 MG
850 TABLET ORAL ONCE
Qty: 0 | Refills: 0 | Status: COMPLETED | OUTPATIENT
Start: 2019-01-12 | End: 2019-01-12

## 2019-01-12 RX ORDER — DEXTROSE MONOHYDRATE, SODIUM CHLORIDE, AND POTASSIUM CHLORIDE 50; .745; 4.5 G/1000ML; G/1000ML; G/1000ML
1000 INJECTION, SOLUTION INTRAVENOUS
Qty: 0 | Refills: 0 | Status: DISCONTINUED | OUTPATIENT
Start: 2019-01-12 | End: 2019-01-18

## 2019-01-12 RX ORDER — NALOXONE HYDROCHLORIDE 4 MG/.1ML
0.1 SPRAY NASAL
Qty: 0 | Refills: 0 | Status: DISCONTINUED | OUTPATIENT
Start: 2019-01-12 | End: 2019-01-15

## 2019-01-12 RX ORDER — HYDROMORPHONE HYDROCHLORIDE 2 MG/ML
1 INJECTION INTRAMUSCULAR; INTRAVENOUS; SUBCUTANEOUS EVERY 4 HOURS
Qty: 0 | Refills: 0 | Status: DISCONTINUED | OUTPATIENT
Start: 2019-01-12 | End: 2019-01-14

## 2019-01-12 RX ORDER — DEXTROSE MONOHYDRATE, SODIUM CHLORIDE, AND POTASSIUM CHLORIDE 50; .745; 4.5 G/1000ML; G/1000ML; G/1000ML
1000 INJECTION, SOLUTION INTRAVENOUS
Qty: 0 | Refills: 0 | Status: DISCONTINUED | OUTPATIENT
Start: 2019-01-12 | End: 2019-01-12

## 2019-01-12 RX ORDER — ONDANSETRON 8 MG/1
4 TABLET, FILM COATED ORAL EVERY 6 HOURS
Qty: 0 | Refills: 0 | Status: DISCONTINUED | OUTPATIENT
Start: 2019-01-12 | End: 2019-01-15

## 2019-01-12 RX ORDER — HYDROMORPHONE HYDROCHLORIDE 2 MG/ML
0.5 INJECTION INTRAMUSCULAR; INTRAVENOUS; SUBCUTANEOUS EVERY 4 HOURS
Qty: 0 | Refills: 0 | Status: DISCONTINUED | OUTPATIENT
Start: 2019-01-12 | End: 2019-01-14

## 2019-01-12 RX ORDER — ACETAMINOPHEN 500 MG
1000 TABLET ORAL ONCE
Qty: 0 | Refills: 0 | Status: COMPLETED | OUTPATIENT
Start: 2019-01-12 | End: 2019-01-12

## 2019-01-12 RX ORDER — HYDROMORPHONE HYDROCHLORIDE 2 MG/ML
0.5 INJECTION INTRAMUSCULAR; INTRAVENOUS; SUBCUTANEOUS
Qty: 0 | Refills: 0 | Status: DISCONTINUED | OUTPATIENT
Start: 2019-01-12 | End: 2019-01-15

## 2019-01-12 RX ADMIN — Medication 850 MILLIGRAM(S): at 13:15

## 2019-01-12 RX ADMIN — Medication 340 MILLIGRAM(S): at 12:39

## 2019-01-12 RX ADMIN — HYDROMORPHONE HYDROCHLORIDE 30 MILLILITER(S): 2 INJECTION INTRAMUSCULAR; INTRAVENOUS; SUBCUTANEOUS at 21:28

## 2019-01-12 RX ADMIN — Medication 2: at 12:41

## 2019-01-12 RX ADMIN — DEXTROSE MONOHYDRATE, SODIUM CHLORIDE, AND POTASSIUM CHLORIDE 100 MILLILITER(S): 50; .745; 4.5 INJECTION, SOLUTION INTRAVENOUS at 19:28

## 2019-01-12 RX ADMIN — Medication 340 MILLIGRAM(S): at 18:10

## 2019-01-12 RX ADMIN — Medication 50 GRAM(S): at 10:38

## 2019-01-12 RX ADMIN — Medication 850 MILLIGRAM(S): at 19:00

## 2019-01-12 RX ADMIN — HYDROMORPHONE HYDROCHLORIDE 1 MILLIGRAM(S): 2 INJECTION INTRAMUSCULAR; INTRAVENOUS; SUBCUTANEOUS at 02:22

## 2019-01-12 RX ADMIN — HEPARIN SODIUM 5000 UNIT(S): 5000 INJECTION INTRAVENOUS; SUBCUTANEOUS at 06:43

## 2019-01-12 RX ADMIN — Medication 2: at 07:34

## 2019-01-12 RX ADMIN — PANTOPRAZOLE SODIUM 40 MILLIGRAM(S): 20 TABLET, DELAYED RELEASE ORAL at 12:43

## 2019-01-12 RX ADMIN — Medication 1 SPRAY(S): at 18:11

## 2019-01-12 RX ADMIN — HEPARIN SODIUM 5000 UNIT(S): 5000 INJECTION INTRAVENOUS; SUBCUTANEOUS at 21:45

## 2019-01-12 RX ADMIN — Medication 2: at 17:16

## 2019-01-12 RX ADMIN — Medication 1000 MILLIGRAM(S): at 03:11

## 2019-01-12 RX ADMIN — Medication 100 MILLIEQUIVALENT(S): at 12:39

## 2019-01-12 RX ADMIN — HYDROMORPHONE HYDROCHLORIDE 1 MILLIGRAM(S): 2 INJECTION INTRAMUSCULAR; INTRAVENOUS; SUBCUTANEOUS at 02:52

## 2019-01-12 RX ADMIN — HEPARIN SODIUM 5000 UNIT(S): 5000 INJECTION INTRAVENOUS; SUBCUTANEOUS at 13:15

## 2019-01-12 RX ADMIN — Medication 400 MILLIGRAM(S): at 02:41

## 2019-01-12 RX ADMIN — Medication 100 MILLIEQUIVALENT(S): at 12:13

## 2019-01-12 NOTE — PROGRESS NOTE ADULT - SUBJECTIVE AND OBJECTIVE BOX
Surgical Oncology Progress Note/Transfer Note    Subjective: Patient seen and examined. No complaints, not having any abdominal pain.  Tolerating a soft diet without N/V.        Objective:   Vital Signs (24 Hrs):  T(C): 37.2 (19 @ 06:41), Max: 37.2 (19 @ 06:41)  HR: 92 (19 @ 06:41) (72 - 96)  BP: 101/54 (19 @ 06:41) (80/42 - 141/59)  RR: 17 (19 @ 06:41) (11 - 23)  SpO2: 93% (19 @ 06:41) (93% - 100%)  Wt(kg): --  Daily Height in cm: 152.4 (2019 07:20)    Daily     I&O's Summary    2019 07:01  -  2019 07:00  --------------------------------------------------------  IN: 1550 mL / OUT: 1510 mL / NET: 40 mL      Physical Exam:  General: Well developed, well nourished, No Acute Distress, uncomfortable  HEENT: Normocephalic Atraumatic, EOMI bl. NGT to suction  Neurology: A&Ox3, PECA present  Abdominal: Soft, midline wound vac over incision, JOURDAN draining serosanguinous fluid from R abdomen  Swanson present draining urine to gravity  Extremities: No Clubbing, cyanosis or edema, FROM  Skin: warm, dry, normal color, no rash or abnormal lesions    LABS:                                     7.9    11.32 )-----------( 212      ( 2019 05:27 )             24.5           137  |  104  |  5<L>  ----------------------------<  119<H>  4.3   |  20<L>  |  0.61    Ca    8.2<L>      2019 16:10  Phos  3.2       Mg     2.1         TPro  5.6<L>  /  Alb  3.1<L>  /  TBili  2.1<H>  /  DBili  x   /  AST  146<H>  /  ALT  74<H>  /  AlkPhos  368<H>        PT/INR - ( 2019 05:57 )   PT: 13.5 SEC;   INR: 1.18          PTT - ( 2019 05:57 )  PTT:31.9 SEC        ( 2019 14:42 ) pH: 7.37  /  pCO2: 38    /  pO2: 403   / HCO3: 22    / Base Excess: -3.1  /  SaO2: 98.5            ( 2019 12:53 ) pH: 7.34  /  pCO2: 42    /  pO2: 375   / HCO3: 22    / Base Excess: -2.7  /  SaO2: 99.9            ( 2019 11:32 ) pH: 7.36  /  pCO2: 42    /  pO2: 371   / HCO3: 23    / Base Excess: -1.6  /  SaO2: 98.5                  Urinalysis Basic - ( 10 Jameel 2019 14:05 )    Color: LIGHT YELLOW / Appearance: CLEAR / S.012 / pH: 8.0  Gluc: NEGATIVE / Ketone: NEGATIVE  / Bili: NEGATIVE / Urobili: NORMAL   Blood: NEGATIVE / Protein: NEGATIVE / Nitrite: NEGATIVE   Leuk Esterase: NEGATIVE / RBC: x / WBC x   Sq Epi: x / Non Sq Epi: x / Bacteria: x Surgical Oncology Progress Note/Transfer Note    Subjective: POD 1 s/p maryamippkam. Patient seen and examined, C/o mod abdominal pain with PECA and IV pain meds.  NPO. NGT to suction, sinha to gravity.      Objective:   Vital Signs (24 Hrs):  T(C): 37.2 (19 @ 06:41), Max: 37.2 (19 @ 06:41)  HR: 92 (19 @ 06:41) (72 - 96)  BP: 101/54 (19 @ 06:41) (80/42 - 141/59)  RR: 17 (19 @ 06:41) (11 - 23)  SpO2: 93% (19 @ 06:41) (93% - 100%)  Wt(kg): --  Daily Height in cm: 152.4 (2019 07:20)    Daily     I&O's Summary    2019 07:01  -  2019 07:00  --------------------------------------------------------  IN: 1550 mL / OUT: 1510 mL / NET: 40 mL      Physical Exam:  General: Well developed, well nourished, No Acute Distress, uncomfortable  HEENT: Normocephalic Atraumatic, EOMI bl. NGT to suction  Neurology: A&Ox3, PECA present  Abdominal: Soft, midline wound vac over incision, JOURDAN draining serosanguinous fluid from R abdomen  Sinha present draining urine to gravity  Extremities: No Clubbing, cyanosis or edema, FROM  Skin: warm, dry, normal color, no rash or abnormal lesions    LABS:                                     7.9    11.32 )-----------( 212      ( 2019 05:27 )             24.5           137  |  104  |  5<L>  ----------------------------<  119<H>  4.3   |  20<L>  |  0.61    Ca    8.2<L>      2019 16:10  Phos  3.2       Mg     2.1         TPro  5.6<L>  /  Alb  3.1<L>  /  TBili  2.1<H>  /  DBili  x   /  AST  146<H>  /  ALT  74<H>  /  AlkPhos  368<H>        PT/INR - ( 2019 05:57 )   PT: 13.5 SEC;   INR: 1.18          PTT - ( 2019 05:57 )  PTT:31.9 SEC        ( 2019 14:42 ) pH: 7.37  /  pCO2: 38    /  pO2: 403   / HCO3: 22    / Base Excess: -3.1  /  SaO2: 98.5            ( 2019 12:53 ) pH: 7.34  /  pCO2: 42    /  pO2: 375   / HCO3: 22    / Base Excess: -2.7  /  SaO2: 99.9            ( 2019 11:32 ) pH: 7.36  /  pCO2: 42    /  pO2: 371   / HCO3: 23    / Base Excess: -1.6  /  SaO2: 98.5                  Urinalysis Basic - ( 10 Jaemel 2019 14:05 )    Color: LIGHT YELLOW / Appearance: CLEAR / S.012 / pH: 8.0  Gluc: NEGATIVE / Ketone: NEGATIVE  / Bili: NEGATIVE / Urobili: NORMAL   Blood: NEGATIVE / Protein: NEGATIVE / Nitrite: NEGATIVE   Leuk Esterase: NEGATIVE / RBC: x / WBC x   Sq Epi: x / Non Sq Epi: x / Bacteria: x

## 2019-01-12 NOTE — PROGRESS NOTE ADULT - SUBJECTIVE AND OBJECTIVE BOX
Anesthesia Pain Management Service: Day 1__ of Epidural    SUBJECTIVE: Patient doing well with PCEA and no problems.  Pain Scale Score:   Refer to charted pain scores    THERAPY:  [x ] Epidural Bupivacaine 0.0625% and Hydromorphone  		[ x] 10 micrograms/mL	[ ] 5 micrograms/mL  [ ] Epidural Bupivacaine 0.0625% and Fentanyl - 2 micrograms/mL  [ ] Epidural Ropivacaine 0.1% plain – 1 mg/mL  [ ] Patient Controlled Regional Anesthesia (PCRA) Ropivacaine  		[ ] 0.2%			[ ] 0.1%    Demand dose __3_ lockout __15_ (minutes) Continuous Rate 6___ Total: 108cc___ Daily      MEDICATIONS  (STANDING):  dextrose 5%. 1000 milliLiter(s) (50 mL/Hr) IV Continuous <Continuous>  dextrose 5%. 1000 milliLiter(s) (50 mL/Hr) IV Continuous <Continuous>  dextrose 50% Injectable 12.5 Gram(s) IV Push once  dextrose 50% Injectable 25 Gram(s) IV Push once  dextrose 50% Injectable 25 Gram(s) IV Push once  heparin  Injectable 5000 Unit(s) SubCutaneous every 8 hours  HYDROmorphone (10 MICROgram(s)/mL) + BUpivacaine 0.0625% in 0.9% Sodium Chloride PCEA 250 milliLiter(s) Epidural PCA Continuous  insulin lispro (HumaLOG) corrective regimen sliding scale   SubCutaneous every 6 hours  pantoprazole  Injectable 40 milliGRAM(s) IV Push daily  sodium chloride 0.45% with potassium chloride 20 mEq/L 1000 milliLiter(s) (100 mL/Hr) IV Continuous <Continuous>    MEDICATIONS  (PRN):  benzocaine 15 mG/menthol 3.6 mG Lozenge 1 Lozenge Oral every 4 hours PRN Sore Throat  dextrose 40% Gel 15 Gram(s) Oral once PRN Blood Glucose LESS THAN 70 milliGRAM(s)/deciliter  glucagon  Injectable 1 milliGRAM(s) IntraMuscular once PRN Glucose LESS THAN 70 milligrams/deciliter  HYDROmorphone  Injectable 1 milliGRAM(s) IV Push every 4 hours PRN Severe Pain (7 - 10)  HYDROmorphone  Injectable 0.5 milliGRAM(s) IV Push every 4 hours PRN Moderate Pain (4 - 6)  HYDROmorphone (10 MICROgram(s)/mL) + BUpivacaine 0.0625% in 0.9% Sodium Chloride PCEA Rescue Clinician  Bolus 5 milliLiter(s) Epidural every 15 minutes PRN for Pain Score greater than 6  naloxone Injectable 0.1 milliGRAM(s) IV Push every 3 minutes PRN For ANY of the following changes in patient status:  A. RR LESS THAN 10 breaths per minute, B. Oxygen saturation LESS THAN 90%, C. Sedation score of 6  ondansetron Injectable 4 milliGRAM(s) IV Push every 6 hours PRN Nausea      OBJECTIVE:    Assessment of Catheter Site:	[ ] Left	[ ] Right  [x ] Epidural 	[ ] Femoral	      [ ] Saphenous   [ ] Supraclavicular   [ ] Other:    [x ] Dressing intact	[x ] Site non-tender	[ x] Site without erythema, discharge, edema  [x ] Epidural tubing and connection checked	[x] Gross neurological exam within normal limits  [ ] Catheter removed – tip intact		[x ] Afebrile	[ ] Febrile: ___    PT/INR - ( 11 Jan 2019 05:57 )   PT: 13.5 SEC;   INR: 1.18          PTT - ( 11 Jan 2019 05:57 )  PTT:31.9 SEC                      7.9    11.32 )-----------( 212      ( 12 Jan 2019 05:27 )             24.5     Vital Signs Last 24 Hrs  T(C): 37.2 (01-12-19 @ 06:41), Max: 37.2 (01-12-19 @ 06:41)  T(F): 98.9 (01-12-19 @ 06:41), Max: 98.9 (01-12-19 @ 06:41)  HR: 92 (01-12-19 @ 06:41) (74 - 96)  BP: 101/54 (01-12-19 @ 06:41) (80/42 - 141/59)  BP(mean): 73 (01-11-19 @ 23:30) (52 - 81)  RR: 17 (01-12-19 @ 06:41) (11 - 23)  SpO2: 93% (01-12-19 @ 06:41) (93% - 100%)      Sedation Score:	[x ] Alert	[ ] Drowsy	[ ] Arousable	[ ] Asleep	[ ] Unresponsive    Side Effects:	[x ] None	[ ] Nausea	[ ] Vomiting	[ ] Pruritus  		[ ] Weakness		[ ] Numbness	[ ] Other:    ASSESSMENT/ PLAN:    Therapy to  be:	[x ] Continue   [ ] Discontinued   [ ] Change to prn Analgesics    Documentation and Verification of current medications:  [ X ] Done	[ ] Not done, not eligible, reason:    Comments: Doing OK with epidural and may continue. Discussed with housestaff that patient not using epidural correctly. patient educated. Also coags ordered for possible removal

## 2019-01-12 NOTE — PROGRESS NOTE ADULT - ASSESSMENT
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 bili s/p ERCP and stent exchange.  LFTs downtrending, no evidence of liver lesion on MRI.      Plan:  - POD 1 s/p Whipple  - PECA  - Swanson  - NGT tube  - NPO  - Monitor bowel function  - Post op check      Surgical Oncology (D Team)  p. [phone] 53 year old woman w/ PMH sig for invasive ampullary adenocarcinoma POD 1 s/p whipple.  Plan:  - POD 1 s/p Whipple  - PECA  - Swanson  - NGT tube  - NPO  - Monitor bowel function  - Post op check      Surgical Oncology (D Team)  p. [phone] 53 year old woman w/ PMH sig for invasive ampullary adenocarcinoma POD 1 s/p whipple.  Plan:  - PCEA  - Swanson  - NGT tube  - NPO  - Monitor bowel function  - Seen and examined with Dr. Hearn      Surgical Oncology (D Team)  p. [phone]

## 2019-01-12 NOTE — PROGRESS NOTE ADULT - SUBJECTIVE AND OBJECTIVE BOX
Patient seen and examined at bedside, in discomfort due to abdominal pain.  Daughter at bedside helped with translation.  Patient feels that she has not gotten relief when she pushed PCEA button.  Given this, it is likely that the PCEA is not working.  However, prolonged PT/INR puts patient at high risk for bleed if catheter were removed today.  PCEA order to be discontinued and patient will be started on IV PCA.  Nurse and medicine resident made aware.  Would recommend repeating PT/INR with morning labs for possible removal.    Dong Gaines  PGY4 Anesthesia Resident

## 2019-01-12 NOTE — PROGRESS NOTE ADULT - SUBJECTIVE AND OBJECTIVE BOX
ANESTHESIA POSTOP CHECK    54y Female POSTOP DAY 1 S/P     [ X] NO APPARENT ANESTHESIA COMPLICATIONS      Comments:

## 2019-01-13 LAB
ALBUMIN SERPL ELPH-MCNC: 3.3 G/DL — SIGNIFICANT CHANGE UP (ref 3.3–5)
ALP SERPL-CCNC: 251 U/L — HIGH (ref 40–120)
ALT FLD-CCNC: 84 U/L — HIGH (ref 4–33)
ANION GAP SERPL CALC-SCNC: 10 MEQ/L — SIGNIFICANT CHANGE UP (ref 7–14)
ANION GAP SERPL CALC-SCNC: 11 MEQ/L — SIGNIFICANT CHANGE UP (ref 7–14)
APTT BLD: 33.2 SEC — SIGNIFICANT CHANGE UP (ref 27.5–36.3)
APTT BLD: 41.9 SEC — HIGH (ref 27.5–36.3)
AST SERPL-CCNC: 122 U/L — HIGH (ref 4–32)
BILIRUB SERPL-MCNC: 1.4 MG/DL — HIGH (ref 0.2–1.2)
BUN SERPL-MCNC: 6 MG/DL — LOW (ref 7–23)
BUN SERPL-MCNC: 7 MG/DL — SIGNIFICANT CHANGE UP (ref 7–23)
CALCIUM SERPL-MCNC: 8.5 MG/DL — SIGNIFICANT CHANGE UP (ref 8.4–10.5)
CALCIUM SERPL-MCNC: 8.6 MG/DL — SIGNIFICANT CHANGE UP (ref 8.4–10.5)
CHLORIDE SERPL-SCNC: 101 MMOL/L — SIGNIFICANT CHANGE UP (ref 98–107)
CHLORIDE SERPL-SCNC: 103 MMOL/L — SIGNIFICANT CHANGE UP (ref 98–107)
CO2 SERPL-SCNC: 22 MMOL/L — SIGNIFICANT CHANGE UP (ref 22–31)
CO2 SERPL-SCNC: 22 MMOL/L — SIGNIFICANT CHANGE UP (ref 22–31)
CREAT SERPL-MCNC: 0.55 MG/DL — SIGNIFICANT CHANGE UP (ref 0.5–1.3)
CREAT SERPL-MCNC: 0.59 MG/DL — SIGNIFICANT CHANGE UP (ref 0.5–1.3)
GLUCOSE SERPL-MCNC: 165 MG/DL — HIGH (ref 70–99)
GLUCOSE SERPL-MCNC: 168 MG/DL — HIGH (ref 70–99)
HCT VFR BLD CALC: 26.7 % — LOW (ref 34.5–45)
HGB BLD-MCNC: 8.5 G/DL — LOW (ref 11.5–15.5)
INR BLD: 1.46 — HIGH (ref 0.88–1.17)
INR BLD: 1.47 — HIGH (ref 0.88–1.17)
MAGNESIUM SERPL-MCNC: 1.9 MG/DL — SIGNIFICANT CHANGE UP (ref 1.6–2.6)
MAGNESIUM SERPL-MCNC: 2 MG/DL — SIGNIFICANT CHANGE UP (ref 1.6–2.6)
MCHC RBC-ENTMCNC: 25.1 PG — LOW (ref 27–34)
MCHC RBC-ENTMCNC: 31.8 % — LOW (ref 32–36)
MCV RBC AUTO: 79 FL — LOW (ref 80–100)
NRBC # FLD: 0 K/UL — LOW (ref 25–125)
PHOSPHATE SERPL-MCNC: 1.2 MG/DL — LOW (ref 2.5–4.5)
PHOSPHATE SERPL-MCNC: 2.8 MG/DL — SIGNIFICANT CHANGE UP (ref 2.5–4.5)
PLATELET # BLD AUTO: 205 K/UL — SIGNIFICANT CHANGE UP (ref 150–400)
PMV BLD: 11.5 FL — SIGNIFICANT CHANGE UP (ref 7–13)
POTASSIUM SERPL-MCNC: 3.8 MMOL/L — SIGNIFICANT CHANGE UP (ref 3.5–5.3)
POTASSIUM SERPL-MCNC: 4.2 MMOL/L — SIGNIFICANT CHANGE UP (ref 3.5–5.3)
POTASSIUM SERPL-SCNC: 3.8 MMOL/L — SIGNIFICANT CHANGE UP (ref 3.5–5.3)
POTASSIUM SERPL-SCNC: 4.2 MMOL/L — SIGNIFICANT CHANGE UP (ref 3.5–5.3)
PROT SERPL-MCNC: 6.1 G/DL — SIGNIFICANT CHANGE UP (ref 6–8.3)
PROTHROM AB SERPL-ACNC: 16.8 SEC — HIGH (ref 9.8–13.1)
PROTHROM AB SERPL-ACNC: 16.9 SEC — HIGH (ref 9.8–13.1)
RBC # BLD: 3.38 M/UL — LOW (ref 3.8–5.2)
RBC # FLD: 16.1 % — HIGH (ref 10.3–14.5)
SODIUM SERPL-SCNC: 134 MMOL/L — LOW (ref 135–145)
SODIUM SERPL-SCNC: 135 MMOL/L — SIGNIFICANT CHANGE UP (ref 135–145)
WBC # BLD: 18.16 K/UL — HIGH (ref 3.8–10.5)
WBC # FLD AUTO: 18.16 K/UL — HIGH (ref 3.8–10.5)

## 2019-01-13 PROCEDURE — 71045 X-RAY EXAM CHEST 1 VIEW: CPT | Mod: 26

## 2019-01-13 PROCEDURE — 93010 ELECTROCARDIOGRAM REPORT: CPT

## 2019-01-13 RX ORDER — POTASSIUM PHOSPHATE, MONOBASIC POTASSIUM PHOSPHATE, DIBASIC 236; 224 MG/ML; MG/ML
21 INJECTION, SOLUTION INTRAVENOUS ONCE
Qty: 0 | Refills: 0 | Status: DISCONTINUED | OUTPATIENT
Start: 2019-01-13 | End: 2019-01-13

## 2019-01-13 RX ORDER — BENZOCAINE AND MENTHOL 5; 1 G/100ML; G/100ML
1 LIQUID ORAL EVERY 6 HOURS
Qty: 0 | Refills: 0 | Status: DISCONTINUED | OUTPATIENT
Start: 2019-01-13 | End: 2019-01-18

## 2019-01-13 RX ORDER — PHYTONADIONE (VIT K1) 5 MG
5 TABLET ORAL ONCE
Qty: 0 | Refills: 0 | Status: COMPLETED | OUTPATIENT
Start: 2019-01-13 | End: 2019-01-13

## 2019-01-13 RX ORDER — ACETAMINOPHEN 500 MG
1000 TABLET ORAL ONCE
Qty: 0 | Refills: 0 | Status: COMPLETED | OUTPATIENT
Start: 2019-01-13 | End: 2019-01-13

## 2019-01-13 RX ADMIN — Medication 400 MILLIGRAM(S): at 13:16

## 2019-01-13 RX ADMIN — HEPARIN SODIUM 5000 UNIT(S): 5000 INJECTION INTRAVENOUS; SUBCUTANEOUS at 05:50

## 2019-01-13 RX ADMIN — Medication 850 MILLIGRAM(S): at 01:44

## 2019-01-13 RX ADMIN — HEPARIN SODIUM 5000 UNIT(S): 5000 INJECTION INTRAVENOUS; SUBCUTANEOUS at 13:16

## 2019-01-13 RX ADMIN — Medication 850 MILLIGRAM(S): at 06:19

## 2019-01-13 RX ADMIN — DEXTROSE MONOHYDRATE, SODIUM CHLORIDE, AND POTASSIUM CHLORIDE 100 MILLILITER(S): 50; .745; 4.5 INJECTION, SOLUTION INTRAVENOUS at 09:44

## 2019-01-13 RX ADMIN — Medication 340 MILLIGRAM(S): at 05:49

## 2019-01-13 RX ADMIN — Medication 400 MILLIGRAM(S): at 22:45

## 2019-01-13 RX ADMIN — HYDROMORPHONE HYDROCHLORIDE 30 MILLILITER(S): 2 INJECTION INTRAMUSCULAR; INTRAVENOUS; SUBCUTANEOUS at 18:57

## 2019-01-13 RX ADMIN — Medication 2: at 01:28

## 2019-01-13 RX ADMIN — Medication 101 MILLIGRAM(S): at 16:05

## 2019-01-13 RX ADMIN — Medication 2: at 12:45

## 2019-01-13 RX ADMIN — HYDROMORPHONE HYDROCHLORIDE 30 MILLILITER(S): 2 INJECTION INTRAMUSCULAR; INTRAVENOUS; SUBCUTANEOUS at 08:19

## 2019-01-13 RX ADMIN — DEXTROSE MONOHYDRATE, SODIUM CHLORIDE, AND POTASSIUM CHLORIDE 100 MILLILITER(S): 50; .745; 4.5 INJECTION, SOLUTION INTRAVENOUS at 20:39

## 2019-01-13 RX ADMIN — Medication 340 MILLIGRAM(S): at 00:38

## 2019-01-13 RX ADMIN — Medication 2: at 06:49

## 2019-01-13 RX ADMIN — BENZOCAINE AND MENTHOL 1 LOZENGE: 5; 1 LIQUID ORAL at 20:38

## 2019-01-13 RX ADMIN — Medication 101 MILLIGRAM(S): at 09:44

## 2019-01-13 RX ADMIN — Medication 85 MILLIMOLE(S): at 10:18

## 2019-01-13 RX ADMIN — HEPARIN SODIUM 5000 UNIT(S): 5000 INJECTION INTRAVENOUS; SUBCUTANEOUS at 22:45

## 2019-01-13 RX ADMIN — Medication 1000 MILLIGRAM(S): at 14:07

## 2019-01-13 RX ADMIN — Medication 1000 MILLIGRAM(S): at 23:15

## 2019-01-13 RX ADMIN — PANTOPRAZOLE SODIUM 40 MILLIGRAM(S): 20 TABLET, DELAYED RELEASE ORAL at 11:48

## 2019-01-13 NOTE — PROGRESS NOTE ADULT - SUBJECTIVE AND OBJECTIVE BOX
Anesthesia Pain Management Service    SUBJECTIVE: Patient is doing well with IV PCA and no significant problems reported.    Pain Scale Score	At rest: ___ 	With Activity: ___ 	[X ] Refer to charted pain scores    THERAPY:    [ ] IV PCA Morphine		[ ] 5 mg/mL	[ ] 1 mg/mL  [X ] IV PCA Hydromorphone	[ ] 5 mg/mL	[X ] 1 mg/mL  [ ] IV PCA Fentanyl		[ ] 50 micrograms/mL    Demand dose __0.2_ lockout __6_ (minutes) Continuous Rate _0__ Total:3.1mg___  Daily      MEDICATIONS  (STANDING):  dextrose 5% + sodium chloride 0.45% with potassium chloride 20 mEq/L 1000 milliLiter(s) (100 mL/Hr) IV Continuous <Continuous>  dextrose 5%. 1000 milliLiter(s) (50 mL/Hr) IV Continuous <Continuous>  dextrose 5%. 1000 milliLiter(s) (50 mL/Hr) IV Continuous <Continuous>  dextrose 50% Injectable 12.5 Gram(s) IV Push once  dextrose 50% Injectable 25 Gram(s) IV Push once  dextrose 50% Injectable 25 Gram(s) IV Push once  heparin  Injectable 5000 Unit(s) SubCutaneous every 8 hours  HYDROmorphone PCA (1 mG/mL) 30 milliLiter(s) PCA Continuous PCA Continuous  insulin lispro (HumaLOG) corrective regimen sliding scale   SubCutaneous every 6 hours  pantoprazole  Injectable 40 milliGRAM(s) IV Push daily  sodium phosphate IVPB 30 milliMole(s) IV Intermittent once    MEDICATIONS  (PRN):  benzocaine 15 mG/menthol 3.6 mG Lozenge 1 Lozenge Oral every 4 hours PRN Sore Throat  dextrose 40% Gel 15 Gram(s) Oral once PRN Blood Glucose LESS THAN 70 milliGRAM(s)/deciliter  glucagon  Injectable 1 milliGRAM(s) IntraMuscular once PRN Glucose LESS THAN 70 milligrams/deciliter  HYDROmorphone  Injectable 1 milliGRAM(s) IV Push every 4 hours PRN Severe Pain (7 - 10)  HYDROmorphone  Injectable 0.5 milliGRAM(s) IV Push every 4 hours PRN Moderate Pain (4 - 6)  HYDROmorphone PCA (1 mG/mL) Rescue Clinician Bolus 0.5 milliGRAM(s) IV Push every 15 minutes PRN for Pain Scale GREATER THAN 6  naloxone Injectable 0.1 milliGRAM(s) IV Push every 3 minutes PRN For ANY of the following changes in patient status:  A. RR LESS THAN 10 breaths per minute, B. Oxygen saturation LESS THAN 90%, C. Sedation score of 6  ondansetron Injectable 4 milliGRAM(s) IV Push every 6 hours PRN Nausea      OBJECTIVE:    Sedation Score:	[ X] Alert	[ ] Drowsy 	[ ] Arousable	[ ] Asleep	[ ] Unresponsive    Side Effects:	[X ] None	[ ] Nausea	[ ] Vomiting	[ ] Pruritus  		[ ] Other:    Vital Signs Last 24 Hrs  T(C): 37.5 (13 Jan 2019 05:51), Max: 37.7 (13 Jan 2019 00:39)  T(F): 99.5 (13 Jan 2019 05:51), Max: 99.8 (13 Jan 2019 00:39)  HR: 100 (13 Jan 2019 05:51) (84 - 100)  BP: 108/56 (13 Jan 2019 05:51) (103/48 - 120/59)  BP(mean): --  RR: 199 (13 Jan 2019 05:51) (18 - 199)  SpO2: 100% (13 Jan 2019 00:39) (96% - 100%)    ASSESSMENT/ PLAN    Therapy to  be:	[ X] Continue   [ ] Discontinued   [ ] Change to prn Analgesics    Documentation and Verification of current medications:   [X] Done	[ ] Not done, not elligible    Comments: INR remains elevated. Vit K ordered by surgical staff. Repeat coags after treatment to remove epidural catheter

## 2019-01-13 NOTE — PROGRESS NOTE ADULT - SUBJECTIVE AND OBJECTIVE BOX
Surgical Oncology Progress Note/Transfer Note    Subjective: Patient seen and examined at bedside. Transitioned to PCA though epidural was not removed because of coag values. NPO. NGT to suction, sinha to gravity.      Objective:     Vital Signs Last 24 Hrs  T(C): 36.8 (2019 22:31), Max: 37.2 (2019 06:41)  T(F): 98.3 (2019 22:31), Max: 98.9 (2019 06:41)  HR: 84 (2019 22:31) (77 - 95)  BP: 103/48 (2019 22:31) (101/54 - 133/58)  BP(mean): --  RR: 18 (2019 22:31) (17 - 20)  SpO2: 100% (2019 22:31) (93% - 100%)    I&O's Summary    2019 07:01  -  2019 07:00  --------------------------------------------------------  IN: 2350 mL / OUT: 1600 mL / NET: 750 mL    2019 07:01  -  2019 00:10  --------------------------------------------------------  IN: 0 mL / OUT: 1732.5 mL / NET: -1732.5 mL        Physical Exam:  General: Well developed, well nourished, No Acute Distress, uncomfortable  HEENT: Normocephalic Atraumatic, EOMI bl. NGT to suction  Neurology: A&Ox3, PECA present  Abdominal: Soft, midline wound vac over incision, JOURDAN draining serosanguinous fluid from R abdomen  Sinha present draining urine to gravity  Extremities: No Clubbing, cyanosis or edema, FROM  Skin: warm, dry, normal color, no rash or abnormal lesions    LABS:                                     7.9    11.32 )-----------( 212      ( 2019 05:27 )             24.5       01-11    137  |  104  |  5<L>  ----------------------------<  119<H>  4.3   |  20<L>  |  0.61    Ca    8.2<L>      2019 16:10  Phos  3.2       Mg     2.1         TPro  5.6<L>  /  Alb  3.1<L>  /  TBili  2.1<H>  /  DBili  x   /  AST  146<H>  /  ALT  74<H>  /  AlkPhos  368<H>        PT/INR - ( 2019 05:57 )   PT: 13.5 SEC;   INR: 1.18          PTT - ( 2019 05:57 )  PTT:31.9 SEC        ( 2019 14:42 ) pH: 7.37  /  pCO2: 38    /  pO2: 403   / HCO3: 22    / Base Excess: -3.1  /  SaO2: 98.5            ( 2019 12:53 ) pH: 7.34  /  pCO2: 42    /  pO2: 375   / HCO3: 22    / Base Excess: -2.7  /  SaO2: 99.9            ( 2019 11:32 ) pH: 7.36  /  pCO2: 42    /  pO2: 371   / HCO3: 23    / Base Excess: -1.6  /  SaO2: 98.5                  Urinalysis Basic - ( 10 Jameel 2019 14:05 )    Color: LIGHT YELLOW / Appearance: CLEAR / S.012 / pH: 8.0  Gluc: NEGATIVE / Ketone: NEGATIVE  / Bili: NEGATIVE / Urobili: NORMAL   Blood: NEGATIVE / Protein: NEGATIVE / Nitrite: NEGATIVE   Leuk Esterase: NEGATIVE / RBC: x / WBC x   Sq Epi: x / Non Sq Epi: x / Bacteria: x

## 2019-01-14 LAB
ALBUMIN SERPL ELPH-MCNC: 2.9 G/DL — LOW (ref 3.3–5)
ALBUMIN SERPL ELPH-MCNC: 3 G/DL — LOW (ref 3.3–5)
ALBUMIN SERPL ELPH-MCNC: 3 G/DL — LOW (ref 3.3–5)
ALP SERPL-CCNC: 260 U/L — HIGH (ref 40–120)
ALP SERPL-CCNC: 263 U/L — HIGH (ref 40–120)
ALP SERPL-CCNC: 263 U/L — HIGH (ref 40–120)
ALT FLD-CCNC: 71 U/L — HIGH (ref 4–33)
ALT FLD-CCNC: 76 U/L — HIGH (ref 4–33)
ALT FLD-CCNC: 76 U/L — HIGH (ref 4–33)
AMYLASE FLD-CCNC: 393 U/L — SIGNIFICANT CHANGE UP
ANION GAP SERPL CALC-SCNC: 11 MEQ/L — SIGNIFICANT CHANGE UP (ref 7–14)
ANION GAP SERPL CALC-SCNC: 12 MEQ/L — SIGNIFICANT CHANGE UP (ref 7–14)
APPEARANCE UR: CLEAR — SIGNIFICANT CHANGE UP
APTT BLD: 33.7 SEC — SIGNIFICANT CHANGE UP (ref 27.5–36.3)
AST SERPL-CCNC: 76 U/L — HIGH (ref 4–32)
AST SERPL-CCNC: 84 U/L — HIGH (ref 4–32)
AST SERPL-CCNC: 84 U/L — HIGH (ref 4–32)
BACTERIA # UR AUTO: NEGATIVE — SIGNIFICANT CHANGE UP
BILIRUB DIRECT SERPL-MCNC: 0.9 MG/DL — HIGH (ref 0.1–0.2)
BILIRUB SERPL-MCNC: 1.6 MG/DL — HIGH (ref 0.2–1.2)
BILIRUB SERPL-MCNC: 1.7 MG/DL — HIGH (ref 0.2–1.2)
BILIRUB SERPL-MCNC: 1.7 MG/DL — HIGH (ref 0.2–1.2)
BILIRUB UR-MCNC: NEGATIVE — SIGNIFICANT CHANGE UP
BLOOD UR QL VISUAL: NEGATIVE — SIGNIFICANT CHANGE UP
BUN SERPL-MCNC: 5 MG/DL — LOW (ref 7–23)
BUN SERPL-MCNC: 5 MG/DL — LOW (ref 7–23)
CALCIUM SERPL-MCNC: 8.6 MG/DL — SIGNIFICANT CHANGE UP (ref 8.4–10.5)
CALCIUM SERPL-MCNC: 8.7 MG/DL — SIGNIFICANT CHANGE UP (ref 8.4–10.5)
CHLORIDE SERPL-SCNC: 100 MMOL/L — SIGNIFICANT CHANGE UP (ref 98–107)
CHLORIDE SERPL-SCNC: 102 MMOL/L — SIGNIFICANT CHANGE UP (ref 98–107)
CO2 SERPL-SCNC: 21 MMOL/L — LOW (ref 22–31)
CO2 SERPL-SCNC: 21 MMOL/L — LOW (ref 22–31)
COLOR SPEC: YELLOW — SIGNIFICANT CHANGE UP
CREAT SERPL-MCNC: 0.51 MG/DL — SIGNIFICANT CHANGE UP (ref 0.5–1.3)
CREAT SERPL-MCNC: 0.53 MG/DL — SIGNIFICANT CHANGE UP (ref 0.5–1.3)
GLUCOSE SERPL-MCNC: 160 MG/DL — HIGH (ref 70–99)
GLUCOSE SERPL-MCNC: 161 MG/DL — HIGH (ref 70–99)
GLUCOSE UR-MCNC: NEGATIVE — SIGNIFICANT CHANGE UP
HCT VFR BLD CALC: 25.8 % — LOW (ref 34.5–45)
HCT VFR BLD CALC: 26.2 % — LOW (ref 34.5–45)
HGB BLD-MCNC: 8.3 G/DL — LOW (ref 11.5–15.5)
HGB BLD-MCNC: 8.4 G/DL — LOW (ref 11.5–15.5)
HYALINE CASTS # UR AUTO: SIGNIFICANT CHANGE UP
INR BLD: 1.31 — HIGH (ref 0.88–1.17)
KETONES UR-MCNC: NEGATIVE — SIGNIFICANT CHANGE UP
LEUKOCYTE ESTERASE UR-ACNC: SIGNIFICANT CHANGE UP
MAGNESIUM SERPL-MCNC: 1.8 MG/DL — SIGNIFICANT CHANGE UP (ref 1.6–2.6)
MCHC RBC-ENTMCNC: 24.9 PG — LOW (ref 27–34)
MCHC RBC-ENTMCNC: 25.2 PG — LOW (ref 27–34)
MCHC RBC-ENTMCNC: 32.1 % — SIGNIFICANT CHANGE UP (ref 32–36)
MCHC RBC-ENTMCNC: 32.2 % — SIGNIFICANT CHANGE UP (ref 32–36)
MCV RBC AUTO: 77.2 FL — LOW (ref 80–100)
MCV RBC AUTO: 78.7 FL — LOW (ref 80–100)
NITRITE UR-MCNC: NEGATIVE — SIGNIFICANT CHANGE UP
NRBC # FLD: 0 K/UL — LOW (ref 25–125)
NRBC # FLD: 0 K/UL — LOW (ref 25–125)
PH UR: 6 — SIGNIFICANT CHANGE UP (ref 5–8)
PHOSPHATE SERPL-MCNC: 2.1 MG/DL — LOW (ref 2.5–4.5)
PLATELET # BLD AUTO: 232 K/UL — SIGNIFICANT CHANGE UP (ref 150–400)
PLATELET # BLD AUTO: 234 K/UL — SIGNIFICANT CHANGE UP (ref 150–400)
PMV BLD: 10.2 FL — SIGNIFICANT CHANGE UP (ref 7–13)
PMV BLD: 10.6 FL — SIGNIFICANT CHANGE UP (ref 7–13)
POTASSIUM SERPL-MCNC: 3.6 MMOL/L — SIGNIFICANT CHANGE UP (ref 3.5–5.3)
POTASSIUM SERPL-MCNC: 4 MMOL/L — SIGNIFICANT CHANGE UP (ref 3.5–5.3)
POTASSIUM SERPL-SCNC: 3.6 MMOL/L — SIGNIFICANT CHANGE UP (ref 3.5–5.3)
POTASSIUM SERPL-SCNC: 4 MMOL/L — SIGNIFICANT CHANGE UP (ref 3.5–5.3)
PROT SERPL-MCNC: 6.1 G/DL — SIGNIFICANT CHANGE UP (ref 6–8.3)
PROT SERPL-MCNC: 6.2 G/DL — SIGNIFICANT CHANGE UP (ref 6–8.3)
PROT SERPL-MCNC: 6.2 G/DL — SIGNIFICANT CHANGE UP (ref 6–8.3)
PROT UR-MCNC: 20 — SIGNIFICANT CHANGE UP
PROTHROM AB SERPL-ACNC: 15 SEC — HIGH (ref 9.8–13.1)
RBC # BLD: 3.33 M/UL — LOW (ref 3.8–5.2)
RBC # BLD: 3.34 M/UL — LOW (ref 3.8–5.2)
RBC # FLD: 15.9 % — HIGH (ref 10.3–14.5)
RBC # FLD: 15.9 % — HIGH (ref 10.3–14.5)
RBC CASTS # UR COMP ASSIST: SIGNIFICANT CHANGE UP (ref 0–?)
SODIUM SERPL-SCNC: 133 MMOL/L — LOW (ref 135–145)
SODIUM SERPL-SCNC: 134 MMOL/L — LOW (ref 135–145)
SP GR SPEC: 1.01 — SIGNIFICANT CHANGE UP (ref 1–1.04)
SQUAMOUS # UR AUTO: SIGNIFICANT CHANGE UP
TROPONIN T, HIGH SENSITIVITY: < 6 NG/L — SIGNIFICANT CHANGE UP (ref ?–14)
TROPONIN T, HIGH SENSITIVITY: < 6 NG/L — SIGNIFICANT CHANGE UP (ref ?–14)
UROBILINOGEN FLD QL: NORMAL — SIGNIFICANT CHANGE UP
WBC # BLD: 19.59 K/UL — HIGH (ref 3.8–10.5)
WBC # BLD: 19.9 K/UL — HIGH (ref 3.8–10.5)
WBC # FLD AUTO: 19.59 K/UL — HIGH (ref 3.8–10.5)
WBC # FLD AUTO: 19.9 K/UL — HIGH (ref 3.8–10.5)
WBC UR QL: HIGH (ref 0–?)

## 2019-01-14 PROCEDURE — 74018 RADEX ABDOMEN 1 VIEW: CPT | Mod: 26

## 2019-01-14 RX ORDER — PIPERACILLIN AND TAZOBACTAM 4; .5 G/20ML; G/20ML
3.38 INJECTION, POWDER, LYOPHILIZED, FOR SOLUTION INTRAVENOUS ONCE
Qty: 0 | Refills: 0 | Status: COMPLETED | OUTPATIENT
Start: 2019-01-14 | End: 2019-01-14

## 2019-01-14 RX ORDER — PIPERACILLIN AND TAZOBACTAM 4; .5 G/20ML; G/20ML
3.38 INJECTION, POWDER, LYOPHILIZED, FOR SOLUTION INTRAVENOUS EVERY 8 HOURS
Qty: 0 | Refills: 0 | Status: DISCONTINUED | OUTPATIENT
Start: 2019-01-14 | End: 2019-01-18

## 2019-01-14 RX ORDER — ENOXAPARIN SODIUM 100 MG/ML
40 INJECTION SUBCUTANEOUS
Qty: 1120 | Refills: 0 | OUTPATIENT
Start: 2019-01-14 | End: 2019-02-10

## 2019-01-14 RX ORDER — MAGNESIUM SULFATE 500 MG/ML
2 VIAL (ML) INJECTION ONCE
Qty: 0 | Refills: 0 | Status: COMPLETED | OUTPATIENT
Start: 2019-01-14 | End: 2019-01-14

## 2019-01-14 RX ORDER — ACETAMINOPHEN 500 MG
1000 TABLET ORAL ONCE
Qty: 0 | Refills: 0 | Status: COMPLETED | OUTPATIENT
Start: 2019-01-14 | End: 2019-01-14

## 2019-01-14 RX ORDER — ACETAMINOPHEN 500 MG
1000 TABLET ORAL ONCE
Qty: 0 | Refills: 0 | Status: COMPLETED | OUTPATIENT
Start: 2019-01-15 | End: 2019-01-15

## 2019-01-14 RX ORDER — POTASSIUM PHOSPHATE, MONOBASIC POTASSIUM PHOSPHATE, DIBASIC 236; 224 MG/ML; MG/ML
15 INJECTION, SOLUTION INTRAVENOUS ONCE
Qty: 0 | Refills: 0 | Status: COMPLETED | OUTPATIENT
Start: 2019-01-14 | End: 2019-01-14

## 2019-01-14 RX ADMIN — PIPERACILLIN AND TAZOBACTAM 25 GRAM(S): 4; .5 INJECTION, POWDER, LYOPHILIZED, FOR SOLUTION INTRAVENOUS at 15:59

## 2019-01-14 RX ADMIN — Medication 2: at 13:03

## 2019-01-14 RX ADMIN — HEPARIN SODIUM 5000 UNIT(S): 5000 INJECTION INTRAVENOUS; SUBCUTANEOUS at 21:11

## 2019-01-14 RX ADMIN — DEXTROSE MONOHYDRATE, SODIUM CHLORIDE, AND POTASSIUM CHLORIDE 100 MILLILITER(S): 50; .745; 4.5 INJECTION, SOLUTION INTRAVENOUS at 18:02

## 2019-01-14 RX ADMIN — DEXTROSE MONOHYDRATE, SODIUM CHLORIDE, AND POTASSIUM CHLORIDE 100 MILLILITER(S): 50; .745; 4.5 INJECTION, SOLUTION INTRAVENOUS at 03:38

## 2019-01-14 RX ADMIN — PIPERACILLIN AND TAZOBACTAM 200 GRAM(S): 4; .5 INJECTION, POWDER, LYOPHILIZED, FOR SOLUTION INTRAVENOUS at 08:05

## 2019-01-14 RX ADMIN — PANTOPRAZOLE SODIUM 40 MILLIGRAM(S): 20 TABLET, DELAYED RELEASE ORAL at 11:17

## 2019-01-14 RX ADMIN — Medication 2: at 18:22

## 2019-01-14 RX ADMIN — Medication 400 MILLIGRAM(S): at 18:01

## 2019-01-14 RX ADMIN — HYDROMORPHONE HYDROCHLORIDE 30 MILLILITER(S): 2 INJECTION INTRAMUSCULAR; INTRAVENOUS; SUBCUTANEOUS at 04:27

## 2019-01-14 RX ADMIN — HYDROMORPHONE HYDROCHLORIDE 30 MILLILITER(S): 2 INJECTION INTRAMUSCULAR; INTRAVENOUS; SUBCUTANEOUS at 07:41

## 2019-01-14 RX ADMIN — Medication 400 MILLIGRAM(S): at 11:08

## 2019-01-14 RX ADMIN — HEPARIN SODIUM 5000 UNIT(S): 5000 INJECTION INTRAVENOUS; SUBCUTANEOUS at 06:36

## 2019-01-14 RX ADMIN — DEXTROSE MONOHYDRATE, SODIUM CHLORIDE, AND POTASSIUM CHLORIDE 100 MILLILITER(S): 50; .745; 4.5 INJECTION, SOLUTION INTRAVENOUS at 08:05

## 2019-01-14 RX ADMIN — Medication 2: at 00:59

## 2019-01-14 RX ADMIN — Medication 1000 MILLIGRAM(S): at 12:37

## 2019-01-14 RX ADMIN — POTASSIUM PHOSPHATE, MONOBASIC POTASSIUM PHOSPHATE, DIBASIC 62.5 MILLIMOLE(S): 236; 224 INJECTION, SOLUTION INTRAVENOUS at 10:51

## 2019-01-14 RX ADMIN — Medication 1000 MILLIGRAM(S): at 18:22

## 2019-01-14 RX ADMIN — BENZOCAINE AND MENTHOL 1 LOZENGE: 5; 1 LIQUID ORAL at 11:17

## 2019-01-14 RX ADMIN — Medication 50 GRAM(S): at 09:07

## 2019-01-14 RX ADMIN — HYDROMORPHONE HYDROCHLORIDE 30 MILLILITER(S): 2 INJECTION INTRAMUSCULAR; INTRAVENOUS; SUBCUTANEOUS at 19:07

## 2019-01-14 RX ADMIN — BENZOCAINE AND MENTHOL 1 LOZENGE: 5; 1 LIQUID ORAL at 18:03

## 2019-01-14 NOTE — CHART NOTE - NSCHARTNOTEFT_GEN_A_CORE
Called to the bedside by intern as patient febrile and tachycardic this evening. A fever of 38.3 was recorded at 21:40 this evening and patient was tachycardic to 109.  At the time her systolic blood pressures were 110-124, saturating 92-95% on RA.  Patient was lying in bed, complaining of abdominal pain, attempting to use her incentive spirometer with family at bedside.  States she feels that there is something in her throat and she feels it is difficult to breath. She was not requiring oxygen. Able to pull 500cc on IS.  She also complains of abdominal pain.  On exam her abdomen was slightly distended and mildly tender around the incision without rebound or guarding. Perveena vac dressing in place, JOURDAN drain with serous material.  Patient has made 650 cc of urine output today (~1 cc/kg/hr) with 20 cc of JOURDAN output for the shift.  EKG was unchanged without signs of ischemia. Blood cultures and a full set of labs including cardiac enzymes were sent.  Patient seen and examined with senior resident Dr. Cortés and Dr. Hearn made aware.    [place] [phone] x [phone] Called to the bedside by intern as patient febrile and tachycardic this evening. A fever of 38.3 was recorded at 21:40 this evening and patient was tachycardic to 109.  At the time her systolic blood pressures were 110-124, saturating 92-95% on RA. Of note this morning the patient had a leukocytosis to 18,000 increased from 12 the day prior.  NGT was removed this morning. Patient was lying in bed, complaining of abdominal pain, attempting to use her incentive spirometer with family at bedside.  States she feels that there is something in her throat and she feels it is difficult to breath. She was not requiring oxygen. Able to pull 500cc on IS.  She also complains of abdominal pain.  On exam her abdomen was slightly distended and mildly tender around the incision without rebound or guarding. Perveena vac dressing in place, JOURDAN drain with serous material.  Patient has made 650 cc of urine output today (~1 cc/kg/hr) with 20 cc of JOURDAN output for the shift.  EKG was unchanged without signs of ischemia. Blood cultures and a full set of labs including cardiac enzymes were sent.  Patient seen and examined with senior resident Dr. Cortés and Dr. Hearn made aware.    [facility] R3 x [phone] Called to the bedside by intern as patient febrile and tachycardic this evening. A fever of 38.3 was recorded at 21:40 this evening and patient was tachycardic to 109.  At the time her systolic blood pressures were 110-124, saturating 92-95% on RA. Of note this morning the patient had a leukocytosis to 18,000 increased from 12 the day prior.  NGT was removed this morning. Patient was lying in bed, complaining of abdominal pain, attempting to use her incentive spirometer with family at bedside.  States she feels that there is something in her throat and she feels it is difficult to breath. She was not requiring oxygen. Able to pull 500cc on IS.  She also complains of abdominal pain.  On exam her abdomen was slightly distended and mildly tender around the incision without rebound or guarding. Perveena vac dressing in place, JOURDAN drain with serous material.  Patient has made 650 cc of urine output today (~1 cc/kg/hr) with 20 cc of JOURDAN output for the shift.  EKG was unchanged without signs of ischemia. A CXR was obtained which did not reveal a significant effusion, consolidation or pulmonary edema. Blood cultures and a full set of labs including cardiac enzymes were sent.  Patient seen and examined with senior resident Dr. Cortés and Dr. Hearn made aware.    [place] [phone] x [phone]

## 2019-01-14 NOTE — PROGRESS NOTE ADULT - SUBJECTIVE AND OBJECTIVE BOX
Surgery Progress Note    S: Patient seen and examined. Overnight, patient had fevers and was diaphoretic. The patient was seen and examined and had a fever workup and cardiac workup. The cardiac workup was negative. The patient has been complaining of pain, however has not been using her PCA enough. Patient educated regarding the importance of using her PCA and also walking multiple times throughout the day. The patient had her NGT removed yesterday and denies any n/v. PCEA remains in place awaiting coags to be normal for it to be removed. Swanson was also removed yesterday.     O:  Vital Signs Last 24 Hrs  T(C): 37.3 (14 Jan 2019 05:46), Max: 38.3 (13 Jan 2019 21:40)  T(F): 99.2 (14 Jan 2019 05:46), Max: 101 (13 Jan 2019 23:11)  HR: 92 (14 Jan 2019 05:46) (86 - 109)  BP: 131/66 (14 Jan 2019 05:46) (97/54 - 131/66)  BP(mean): --  RR: 12 (14 Jan 2019 06:30) (12 - 22)  SpO2: 98% (14 Jan 2019 06:30) (93% - 99%)    I&O's Detail    12 Jan 2019 07:01  -  13 Jan 2019 07:00  --------------------------------------------------------  IN:    dextrose 5% + sodium chloride 0.45% with potassium chloride 20 mEq/L: 1200 mL  Total IN: 1200 mL    OUT:    Bulb: 32.5 mL    Indwelling Catheter - Urethral: 1600 mL    Nasoenteral Tube: 525 mL  Total OUT: 2157.5 mL    Total NET: -957.5 mL      13 Jan 2019 07:01  -  14 Jan 2019 06:45  --------------------------------------------------------  IN:    dextrose 5% + sodium chloride 0.45% with potassium chloride 20 mEq/L: 400 mL    IV PiggyBack: 100 mL  Total IN: 500 mL    OUT:    Bulb: 30 mL    Nasoenteral Tube: 125 mL    Voided: 1050 mL  Total OUT: 1205 mL    Total NET: -705 mL          MEDICATIONS  (STANDING):  acetaminophen  IVPB .. 1000 milliGRAM(s) IV Intermittent once  acetaminophen  IVPB .. 1000 milliGRAM(s) IV Intermittent once  acetaminophen  IVPB .. 1000 milliGRAM(s) IV Intermittent once  benzocaine 15 mG/menthol 3.6 mG Lozenge 1 Lozenge Oral every 6 hours  dextrose 5% + sodium chloride 0.45% with potassium chloride 20 mEq/L 1000 milliLiter(s) (100 mL/Hr) IV Continuous <Continuous>  dextrose 5%. 1000 milliLiter(s) (50 mL/Hr) IV Continuous <Continuous>  dextrose 50% Injectable 12.5 Gram(s) IV Push once  dextrose 50% Injectable 25 Gram(s) IV Push once  dextrose 50% Injectable 25 Gram(s) IV Push once  heparin  Injectable 5000 Unit(s) SubCutaneous every 8 hours  HYDROmorphone PCA (1 mG/mL) 30 milliLiter(s) PCA Continuous PCA Continuous  insulin lispro (HumaLOG) corrective regimen sliding scale   SubCutaneous every 6 hours  pantoprazole  Injectable 40 milliGRAM(s) IV Push daily    MEDICATIONS  (PRN):  benzocaine 15 mG/menthol 3.6 mG Lozenge 1 Lozenge Oral every 4 hours PRN Sore Throat  dextrose 40% Gel 15 Gram(s) Oral once PRN Blood Glucose LESS THAN 70 milliGRAM(s)/deciliter  glucagon  Injectable 1 milliGRAM(s) IntraMuscular once PRN Glucose LESS THAN 70 milligrams/deciliter  HYDROmorphone  Injectable 1 milliGRAM(s) IV Push every 4 hours PRN Severe Pain (7 - 10)  HYDROmorphone  Injectable 0.5 milliGRAM(s) IV Push every 4 hours PRN Moderate Pain (4 - 6)  HYDROmorphone PCA (1 mG/mL) Rescue Clinician Bolus 0.5 milliGRAM(s) IV Push every 15 minutes PRN for Pain Scale GREATER THAN 6  naloxone Injectable 0.1 milliGRAM(s) IV Push every 3 minutes PRN For ANY of the following changes in patient status:  A. RR LESS THAN 10 breaths per minute, B. Oxygen saturation LESS THAN 90%, C. Sedation score of 6  ondansetron Injectable 4 milliGRAM(s) IV Push every 6 hours PRN Nausea                            8.4    19.59 )-----------( 234      ( 14 Jan 2019 04:40 )             26.2       01-14    134<L>  |  102  |  5<L>  ----------------------------<  160<H>  4.0   |  21<L>  |  0.51    Ca    8.6      14 Jan 2019 04:40  Phos  2.1     01-14  Mg     1.8     01-14    TPro  6.1  /  Alb  2.9<L>  /  TBili  1.6<H>  /  DBili  x   /  AST  76<H>  /  ALT  71<H>  /  AlkPhos  260<H>  01-14      Physical Exam:  Gen: Laying in bed, NAD  Resp: Unlabored breathing with nasal canula  Abd: soft, NTND, midline vac to suction, RUQ JOURDAN with SS output, no rebound or guarding  Ext: WWP  Skin: No rashes

## 2019-01-14 NOTE — PROGRESS NOTE ADULT - SUBJECTIVE AND OBJECTIVE BOX
Anesthesia Pain Management Service    SUBJECTIVE: Pt doing well with PCEA removed & now on IV PCA without problems reported.    Therapy:	  [ ] IV PCA	   [ X] Epidural           [ ] s/p Spinal Opoid              [ ] Postpartum infusion	  [ ] Patient controlled regional anesthesia (PCRA)    [ ] prn Analgesics    Allergies    No Known Allergies    Intolerances      MEDICATIONS  (STANDING):  acetaminophen  IVPB .. 1000 milliGRAM(s) IV Intermittent once  benzocaine 15 mG/menthol 3.6 mG Lozenge 1 Lozenge Oral every 6 hours  dextrose 5% + sodium chloride 0.45% with potassium chloride 20 mEq/L 1000 milliLiter(s) (100 mL/Hr) IV Continuous <Continuous>  dextrose 5%. 1000 milliLiter(s) (50 mL/Hr) IV Continuous <Continuous>  dextrose 50% Injectable 12.5 Gram(s) IV Push once  dextrose 50% Injectable 25 Gram(s) IV Push once  dextrose 50% Injectable 25 Gram(s) IV Push once  heparin  Injectable 5000 Unit(s) SubCutaneous every 8 hours  HYDROmorphone PCA (1 mG/mL) 30 milliLiter(s) PCA Continuous PCA Continuous  insulin lispro (HumaLOG) corrective regimen sliding scale   SubCutaneous every 6 hours  pantoprazole  Injectable 40 milliGRAM(s) IV Push daily  piperacillin/tazobactam IVPB. 3.375 Gram(s) IV Intermittent every 8 hours    MEDICATIONS  (PRN):  benzocaine 15 mG/menthol 3.6 mG Lozenge 1 Lozenge Oral every 4 hours PRN Sore Throat  dextrose 40% Gel 15 Gram(s) Oral once PRN Blood Glucose LESS THAN 70 milliGRAM(s)/deciliter  glucagon  Injectable 1 milliGRAM(s) IntraMuscular once PRN Glucose LESS THAN 70 milligrams/deciliter  HYDROmorphone PCA (1 mG/mL) Rescue Clinician Bolus 0.5 milliGRAM(s) IV Push every 15 minutes PRN for Pain Scale GREATER THAN 6  naloxone Injectable 0.1 milliGRAM(s) IV Push every 3 minutes PRN For ANY of the following changes in patient status:  A. RR LESS THAN 10 breaths per minute, B. Oxygen saturation LESS THAN 90%, C. Sedation score of 6  ondansetron Injectable 4 milliGRAM(s) IV Push every 6 hours PRN Nausea      OBJECTIVE:   [X] No new signs     [ ] Other:    Side Effects:  [X ] None			[ ] Other:    Assessment of Catheter Site:		[ X] Intact		[ ] Other:    ASSESSMENT/PLAN  [ X] Continue current therapy    [ ] Therapy changed to:    [ ] IV PCA       [ ] Epidural     [ ] prn Analgesics     Comments: Continue current IV PCA settings.    Progress Note written now but Patient was seen earlier.

## 2019-01-14 NOTE — PROGRESS NOTE ADULT - SUBJECTIVE AND OBJECTIVE BOX
Anesthesia Pain Management Service    SUBJECTIVE: Patient's family at bedside and patient said she had 9/10 pain at surgical site.  Patient's family feels that is not entirely true because she is a bit confused, and though they know the patient is in real pain, it is not always at 9/10.    Pain Scale Score	At rest: __9/10_ 	With Activity: ___ 	[X ] Refer to charted pain scores    THERAPY:    [ ] IV PCA Morphine		[ ] 5 mg/mL	[ ] 1 mg/mL  [X ] IV PCA Hydromorphone	[ ] 5 mg/mL	[X ] 1 mg/mL  [ ] IV PCA Fentanyl		[ ] 50 micrograms/mL    Demand dose __0.2_ lockout __6_ (minutes) Continuous Rate _0__ Total: 11.8___  mg used (in past 24 hours)      MEDICATIONS  (STANDING):  acetaminophen  IVPB .. 1000 milliGRAM(s) IV Intermittent once  benzocaine 15 mG/menthol 3.6 mG Lozenge 1 Lozenge Oral every 6 hours  dextrose 5% + sodium chloride 0.45% with potassium chloride 20 mEq/L 1000 milliLiter(s) (100 mL/Hr) IV Continuous <Continuous>  dextrose 5%. 1000 milliLiter(s) (50 mL/Hr) IV Continuous <Continuous>  dextrose 50% Injectable 12.5 Gram(s) IV Push once  dextrose 50% Injectable 25 Gram(s) IV Push once  dextrose 50% Injectable 25 Gram(s) IV Push once  heparin  Injectable 5000 Unit(s) SubCutaneous every 8 hours  HYDROmorphone PCA (1 mG/mL) 30 milliLiter(s) PCA Continuous PCA Continuous  insulin lispro (HumaLOG) corrective regimen sliding scale   SubCutaneous every 6 hours  pantoprazole  Injectable 40 milliGRAM(s) IV Push daily  piperacillin/tazobactam IVPB. 3.375 Gram(s) IV Intermittent every 8 hours    MEDICATIONS  (PRN):  benzocaine 15 mG/menthol 3.6 mG Lozenge 1 Lozenge Oral every 4 hours PRN Sore Throat  dextrose 40% Gel 15 Gram(s) Oral once PRN Blood Glucose LESS THAN 70 milliGRAM(s)/deciliter  glucagon  Injectable 1 milliGRAM(s) IntraMuscular once PRN Glucose LESS THAN 70 milligrams/deciliter  HYDROmorphone PCA (1 mG/mL) Rescue Clinician Bolus 0.5 milliGRAM(s) IV Push every 15 minutes PRN for Pain Scale GREATER THAN 6  naloxone Injectable 0.1 milliGRAM(s) IV Push every 3 minutes PRN For ANY of the following changes in patient status:  A. RR LESS THAN 10 breaths per minute, B. Oxygen saturation LESS THAN 90%, C. Sedation score of 6  ondansetron Injectable 4 milliGRAM(s) IV Push every 6 hours PRN Nausea      OBJECTIVE:  Patient lying in bed, eyes closed, no grimacing noted.    Sedation Score:	[ X] Alert	[ ] Drowsy 	[ ] Arousable	[ ] Asleep	[ ] Unresponsive    Side Effects:	[X ] None	[ ] Nausea	[ ] Vomiting	[ ] Pruritus  		[ ] Other:    Vital Signs Last 24 Hrs  T(C): 36.6 (14 Jan 2019 13:48), Max: 38.3 (13 Jan 2019 21:40)  T(F): 97.9 (14 Jan 2019 13:48), Max: 101 (13 Jan 2019 23:11)  HR: 80 (14 Jan 2019 13:48) (80 - 109)  BP: 102/53 (14 Jan 2019 13:48) (99/55 - 131/66)  BP(mean): --  RR: 14 (14 Jan 2019 13:48) (12 - 20)  SpO2: 100% (14 Jan 2019 13:48) (93% - 100%)    ASSESSMENT/ PLAN    Therapy to  be:	[ X] Continue   [ ] Discontinued   [ ] Change to prn Analgesics    Documentation and Verification of current medications:   [X] Done	[ ] Not done, not elligible    Comments:  Continue current pain regimen.    Progress Note written now but Patient was seen earlier.

## 2019-01-14 NOTE — PROGRESS NOTE ADULT - SUBJECTIVE AND OBJECTIVE BOX
Anesthesia Pain Management Service: Day 4__ of Epidural    SUBJECTIVE: Patient's epidural catheter in place, but not connected to pump.  IV PCA ordered and currently running.    Pain Scale Score:   Refer to charted pain scores    THERAPY:  [x ] Epidural Bupivacaine 0.0625% and Hydromorphone  		[X ] 10 micrograms/mL	[ ] 5 micrograms/mL  [ ] Epidural Bupivacaine 0.0625% and Fentanyl - 2 micrograms/mL  [ ] Epidural Ropivacaine 0.1% plain – 1 mg/mL  [ ] Patient Controlled Regional Anesthesia (PCRA) Ropivacaine  		[ ] 0.2%			[ ] 0.1%    Demand dose __3_ lockout __15_ (minutes) Continuous Rate ___ Total: _0__ Daily      MEDICATIONS  (STANDING):  acetaminophen  IVPB .. 1000 milliGRAM(s) IV Intermittent once  benzocaine 15 mG/menthol 3.6 mG Lozenge 1 Lozenge Oral every 6 hours  dextrose 5% + sodium chloride 0.45% with potassium chloride 20 mEq/L 1000 milliLiter(s) (100 mL/Hr) IV Continuous <Continuous>  dextrose 5%. 1000 milliLiter(s) (50 mL/Hr) IV Continuous <Continuous>  dextrose 50% Injectable 12.5 Gram(s) IV Push once  dextrose 50% Injectable 25 Gram(s) IV Push once  dextrose 50% Injectable 25 Gram(s) IV Push once  heparin  Injectable 5000 Unit(s) SubCutaneous every 8 hours  HYDROmorphone PCA (1 mG/mL) 30 milliLiter(s) PCA Continuous PCA Continuous  insulin lispro (HumaLOG) corrective regimen sliding scale   SubCutaneous every 6 hours  pantoprazole  Injectable 40 milliGRAM(s) IV Push daily  piperacillin/tazobactam IVPB. 3.375 Gram(s) IV Intermittent every 8 hours    MEDICATIONS  (PRN):  benzocaine 15 mG/menthol 3.6 mG Lozenge 1 Lozenge Oral every 4 hours PRN Sore Throat  dextrose 40% Gel 15 Gram(s) Oral once PRN Blood Glucose LESS THAN 70 milliGRAM(s)/deciliter  glucagon  Injectable 1 milliGRAM(s) IntraMuscular once PRN Glucose LESS THAN 70 milligrams/deciliter  HYDROmorphone PCA (1 mG/mL) Rescue Clinician Bolus 0.5 milliGRAM(s) IV Push every 15 minutes PRN for Pain Scale GREATER THAN 6  naloxone Injectable 0.1 milliGRAM(s) IV Push every 3 minutes PRN For ANY of the following changes in patient status:  A. RR LESS THAN 10 breaths per minute, B. Oxygen saturation LESS THAN 90%, C. Sedation score of 6  ondansetron Injectable 4 milliGRAM(s) IV Push every 6 hours PRN Nausea      OBJECTIVE:  Patient lying in bed.    Assessment of Catheter Site:	[ ] Left	[ ] Right  [x ] Epidural 	[ ] Femoral	      [ ] Saphenous   [ ] Supraclavicular   [ ] Other:    [x ] Dressing intact	[x ] Site non-tender	[ x] Site without erythema, discharge, edema  [x ] Epidural tubing and connection checked	[x] Gross neurological exam within normal limits  [X ] Catheter removed – tip intact		[ ] Afebrile	  [ ] Febrile: ___       [ X] see Temp under VS below)    PT/INR - ( 14 Jan 2019 04:40 )   PT: 15.0 SEC;   INR: 1.31          PTT - ( 14 Jan 2019 04:40 )  PTT:33.7 SEC                      8.4    19.59 )-----------( 234      ( 14 Jan 2019 04:40 )             26.2     Vital Signs Last 24 Hrs  T(C): 37.6 (01-14-19 @ 10:06), Max: 38.3 (01-13-19 @ 21:40)  T(F): 99.7 (01-14-19 @ 10:06), Max: 101 (01-13-19 @ 23:11)  HR: 99 (01-14-19 @ 10:06) (91 - 109)  BP: 114/62 (01-14-19 @ 10:06) (99/55 - 131/66)  BP(mean): --  RR: 14 (01-14-19 @ 10:06) (12 - 20)  SpO2: 100% (01-14-19 @ 10:06) (93% - 100%)      Sedation Score:	[x ] Alert	[ ] Drowsy	[ ] Arousable	[ ] Asleep	[ ] Unresponsive    Side Effects:	[x ] None	[ ] Nausea	[ ] Vomiting	[ ] Pruritus  		[ ] Weakness		[ ] Numbness	[ ] Other:    ASSESSMENT/ PLAN:    Therapy to  be:	[ ] Continue   [ X] Discontinued   [ X] Change to prn Analgesics    Documentation and Verification of current medications:  [ X ] Done	[ ] Not done, not eligible, reason:    Comments: Patient's epidural placement site was clean, no swelling or redness noted.  Coags were seen, INR 1.31 okay to discontinue epidural as per pain attending.  Epidural catheter removed with tip intact without incidence.  IV PCA ordered and running. RN notified to not give SQ Heparin 4 hours after removal of epidural catheter.    Progress Note written now but Patient was seen earlier.

## 2019-01-14 NOTE — PROGRESS NOTE ADULT - ASSESSMENT
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  Plan:    - Pain control with PCA  - NPO/IVF  - Monitor bowel function  - OOB as tolerated  - f/u fever work up  - ofirmev RTC  - remove PCEA when coags normal    Surgical Oncology (D Team)  p. [phone]

## 2019-01-15 LAB
ALBUMIN SERPL ELPH-MCNC: 2.6 G/DL — LOW (ref 3.3–5)
ALP SERPL-CCNC: 231 U/L — HIGH (ref 40–120)
ALT FLD-CCNC: 50 U/L — HIGH (ref 4–33)
ANION GAP SERPL CALC-SCNC: 10 MEQ/L — SIGNIFICANT CHANGE UP (ref 7–14)
AST SERPL-CCNC: 38 U/L — HIGH (ref 4–32)
BILIRUB DIRECT SERPL-MCNC: 1.1 MG/DL — HIGH (ref 0.1–0.2)
BILIRUB SERPL-MCNC: 1.9 MG/DL — HIGH (ref 0.2–1.2)
BUN SERPL-MCNC: 4 MG/DL — LOW (ref 7–23)
CALCIUM SERPL-MCNC: 8.4 MG/DL — SIGNIFICANT CHANGE UP (ref 8.4–10.5)
CHLORIDE SERPL-SCNC: 101 MMOL/L — SIGNIFICANT CHANGE UP (ref 98–107)
CO2 SERPL-SCNC: 23 MMOL/L — SIGNIFICANT CHANGE UP (ref 22–31)
CREAT SERPL-MCNC: 0.47 MG/DL — LOW (ref 0.5–1.3)
GLUCOSE SERPL-MCNC: 152 MG/DL — HIGH (ref 70–99)
HCT VFR BLD CALC: 22.4 % — LOW (ref 34.5–45)
HCT VFR BLD CALC: 22.8 % — LOW (ref 34.5–45)
HGB BLD-MCNC: 7.1 G/DL — LOW (ref 11.5–15.5)
HGB BLD-MCNC: 7.3 G/DL — LOW (ref 11.5–15.5)
MAGNESIUM SERPL-MCNC: 2 MG/DL — SIGNIFICANT CHANGE UP (ref 1.6–2.6)
MCHC RBC-ENTMCNC: 24.2 PG — LOW (ref 27–34)
MCHC RBC-ENTMCNC: 24.7 PG — LOW (ref 27–34)
MCHC RBC-ENTMCNC: 31.7 % — LOW (ref 32–36)
MCHC RBC-ENTMCNC: 32 % — SIGNIFICANT CHANGE UP (ref 32–36)
MCV RBC AUTO: 76.5 FL — LOW (ref 80–100)
MCV RBC AUTO: 77 FL — LOW (ref 80–100)
NRBC # FLD: 0 K/UL — LOW (ref 25–125)
NRBC # FLD: 0 K/UL — LOW (ref 25–125)
PHOSPHATE SERPL-MCNC: 2.3 MG/DL — LOW (ref 2.5–4.5)
PLATELET # BLD AUTO: 203 K/UL — SIGNIFICANT CHANGE UP (ref 150–400)
PLATELET # BLD AUTO: 218 K/UL — SIGNIFICANT CHANGE UP (ref 150–400)
PMV BLD: 10.7 FL — SIGNIFICANT CHANGE UP (ref 7–13)
PMV BLD: 9.9 FL — SIGNIFICANT CHANGE UP (ref 7–13)
POTASSIUM SERPL-MCNC: 4.1 MMOL/L — SIGNIFICANT CHANGE UP (ref 3.5–5.3)
POTASSIUM SERPL-SCNC: 4.1 MMOL/L — SIGNIFICANT CHANGE UP (ref 3.5–5.3)
PROT SERPL-MCNC: 5.8 G/DL — LOW (ref 6–8.3)
RBC # BLD: 2.93 M/UL — LOW (ref 3.8–5.2)
RBC # BLD: 2.96 M/UL — LOW (ref 3.8–5.2)
RBC # FLD: 15.5 % — HIGH (ref 10.3–14.5)
RBC # FLD: 15.8 % — HIGH (ref 10.3–14.5)
SODIUM SERPL-SCNC: 134 MMOL/L — LOW (ref 135–145)
SPECIMEN SOURCE: SIGNIFICANT CHANGE UP
SPECIMEN SOURCE: SIGNIFICANT CHANGE UP
WBC # BLD: 16.42 K/UL — HIGH (ref 3.8–10.5)
WBC # BLD: 17.03 K/UL — HIGH (ref 3.8–10.5)
WBC # FLD AUTO: 16.42 K/UL — HIGH (ref 3.8–10.5)
WBC # FLD AUTO: 17.03 K/UL — HIGH (ref 3.8–10.5)

## 2019-01-15 RX ORDER — ACETAMINOPHEN 500 MG
1000 TABLET ORAL ONCE
Qty: 0 | Refills: 0 | Status: COMPLETED | OUTPATIENT
Start: 2019-01-15 | End: 2019-01-15

## 2019-01-15 RX ORDER — ACETAMINOPHEN 500 MG
1000 TABLET ORAL ONCE
Qty: 0 | Refills: 0 | Status: COMPLETED | OUTPATIENT
Start: 2019-01-16 | End: 2019-01-16

## 2019-01-15 RX ORDER — HYDROMORPHONE HYDROCHLORIDE 2 MG/ML
0.5 INJECTION INTRAMUSCULAR; INTRAVENOUS; SUBCUTANEOUS EVERY 4 HOURS
Qty: 0 | Refills: 0 | Status: DISCONTINUED | OUTPATIENT
Start: 2019-01-15 | End: 2019-01-18

## 2019-01-15 RX ORDER — KETOROLAC TROMETHAMINE 30 MG/ML
15 SYRINGE (ML) INJECTION EVERY 6 HOURS
Qty: 0 | Refills: 0 | Status: DISCONTINUED | OUTPATIENT
Start: 2019-01-15 | End: 2019-01-18

## 2019-01-15 RX ORDER — HYDROMORPHONE HYDROCHLORIDE 2 MG/ML
1 INJECTION INTRAMUSCULAR; INTRAVENOUS; SUBCUTANEOUS EVERY 4 HOURS
Qty: 0 | Refills: 0 | Status: DISCONTINUED | OUTPATIENT
Start: 2019-01-15 | End: 2019-01-18

## 2019-01-15 RX ADMIN — HYDROMORPHONE HYDROCHLORIDE 30 MILLILITER(S): 2 INJECTION INTRAMUSCULAR; INTRAVENOUS; SUBCUTANEOUS at 07:38

## 2019-01-15 RX ADMIN — DEXTROSE MONOHYDRATE, SODIUM CHLORIDE, AND POTASSIUM CHLORIDE 100 MILLILITER(S): 50; .745; 4.5 INJECTION, SOLUTION INTRAVENOUS at 05:19

## 2019-01-15 RX ADMIN — PIPERACILLIN AND TAZOBACTAM 25 GRAM(S): 4; .5 INJECTION, POWDER, LYOPHILIZED, FOR SOLUTION INTRAVENOUS at 08:36

## 2019-01-15 RX ADMIN — HEPARIN SODIUM 5000 UNIT(S): 5000 INJECTION INTRAVENOUS; SUBCUTANEOUS at 05:20

## 2019-01-15 RX ADMIN — Medication 400 MILLIGRAM(S): at 06:47

## 2019-01-15 RX ADMIN — BENZOCAINE AND MENTHOL 1 LOZENGE: 5; 1 LIQUID ORAL at 00:14

## 2019-01-15 RX ADMIN — HEPARIN SODIUM 5000 UNIT(S): 5000 INJECTION INTRAVENOUS; SUBCUTANEOUS at 22:19

## 2019-01-15 RX ADMIN — Medication 2: at 12:32

## 2019-01-15 RX ADMIN — Medication 400 MILLIGRAM(S): at 17:42

## 2019-01-15 RX ADMIN — Medication 15 MILLIGRAM(S): at 17:42

## 2019-01-15 RX ADMIN — Medication 15 MILLIGRAM(S): at 11:45

## 2019-01-15 RX ADMIN — Medication 1000 MILLIGRAM(S): at 07:20

## 2019-01-15 RX ADMIN — DEXTROSE MONOHYDRATE, SODIUM CHLORIDE, AND POTASSIUM CHLORIDE 100 MILLILITER(S): 50; .745; 4.5 INJECTION, SOLUTION INTRAVENOUS at 00:12

## 2019-01-15 RX ADMIN — PIPERACILLIN AND TAZOBACTAM 25 GRAM(S): 4; .5 INJECTION, POWDER, LYOPHILIZED, FOR SOLUTION INTRAVENOUS at 00:12

## 2019-01-15 RX ADMIN — PIPERACILLIN AND TAZOBACTAM 25 GRAM(S): 4; .5 INJECTION, POWDER, LYOPHILIZED, FOR SOLUTION INTRAVENOUS at 16:41

## 2019-01-15 RX ADMIN — BENZOCAINE AND MENTHOL 1 LOZENGE: 5; 1 LIQUID ORAL at 05:20

## 2019-01-15 RX ADMIN — HEPARIN SODIUM 5000 UNIT(S): 5000 INJECTION INTRAVENOUS; SUBCUTANEOUS at 14:42

## 2019-01-15 RX ADMIN — Medication 1000 MILLIGRAM(S): at 03:35

## 2019-01-15 RX ADMIN — Medication 400 MILLIGRAM(S): at 11:45

## 2019-01-15 RX ADMIN — Medication 2: at 06:47

## 2019-01-15 RX ADMIN — PANTOPRAZOLE SODIUM 40 MILLIGRAM(S): 20 TABLET, DELAYED RELEASE ORAL at 11:45

## 2019-01-15 RX ADMIN — Medication 400 MILLIGRAM(S): at 00:12

## 2019-01-15 NOTE — DIETITIAN INITIAL EVALUATION ADULT. - PERTINENT MEDS FT
acetaminophen  IVPB ..  benzocaine 15 mG/menthol 3.6 mG Lozenge PRN  benzocaine 15 mG/menthol 3.6 mG Lozenge  dextrose 40% Gel PRN  dextrose 5% + sodium chloride 0.45% with potassium chloride 20 mEq/L  dextrose 5%.  dextrose 50% Injectable  dextrose 50% Injectable  dextrose 50% Injectable  glucagon  Injectable PRN  heparin  Injectable  HYDROmorphone  Injectable PRN  HYDROmorphone  Injectable PRN  insulin lispro (HumaLOG) corrective regimen sliding scale  ketorolac   Injectable  pantoprazole  Injectable  piperacillin/tazobactam IVPB.

## 2019-01-15 NOTE — PROGRESS NOTE ADULT - SUBJECTIVE AND OBJECTIVE BOX
Anesthesia Pain Management Service    SUBJECTIVE: Pt now off IV PCA without problems reported.    Therapy:	  [ X] IV PCA	   [ ] Epidural           [ ] s/p Spinal Opoid              [ ] Postpartum infusion	  [ ] Patient controlled regional anesthesia (PCRA)    [ ] prn Analgesics    Allergies    No Known Allergies    Intolerances      MEDICATIONS  (STANDING):  benzocaine 15 mG/menthol 3.6 mG Lozenge 1 Lozenge Oral every 6 hours  dextrose 5% + sodium chloride 0.45% with potassium chloride 20 mEq/L 1000 milliLiter(s) (100 mL/Hr) IV Continuous <Continuous>  dextrose 5%. 1000 milliLiter(s) (50 mL/Hr) IV Continuous <Continuous>  dextrose 50% Injectable 12.5 Gram(s) IV Push once  dextrose 50% Injectable 25 Gram(s) IV Push once  dextrose 50% Injectable 25 Gram(s) IV Push once  heparin  Injectable 5000 Unit(s) SubCutaneous every 8 hours  insulin lispro (HumaLOG) corrective regimen sliding scale   SubCutaneous every 6 hours  ketorolac   Injectable 15 milliGRAM(s) IV Push every 6 hours  pantoprazole  Injectable 40 milliGRAM(s) IV Push daily  piperacillin/tazobactam IVPB. 3.375 Gram(s) IV Intermittent every 8 hours    MEDICATIONS  (PRN):  benzocaine 15 mG/menthol 3.6 mG Lozenge 1 Lozenge Oral every 4 hours PRN Sore Throat  dextrose 40% Gel 15 Gram(s) Oral once PRN Blood Glucose LESS THAN 70 milliGRAM(s)/deciliter  glucagon  Injectable 1 milliGRAM(s) IntraMuscular once PRN Glucose LESS THAN 70 milligrams/deciliter  HYDROmorphone  Injectable 0.5 milliGRAM(s) IV Push every 4 hours PRN Moderate Pain (4 - 6)  HYDROmorphone  Injectable 1 milliGRAM(s) IV Push every 4 hours PRN Severe Pain (7 - 10)      OBJECTIVE:   [X] No new signs     [ ] Other:    Side Effects:  [X ] None			[ ] Other:    Assessment of Catheter Site:		[ ] Intact		[ ] Other:    ASSESSMENT/PLAN  [ ] Continue current therapy    [X ] Therapy changed to:    [ ] IV PCA       [ ] Epidural     [ X] prn Analgesics     Comments: PRN Oral/IV opioids and/or non-opioid adjuvant analgesics to be used at this point.    Progress Note written now but Patient was seen earlier.

## 2019-01-15 NOTE — PROGRESS NOTE ADULT - SUBJECTIVE AND OBJECTIVE BOX
Anesthesia Pain Management Service    SUBJECTIVE: Patient reports 9/10 pain, feels IV PCA helps a little and IV Tylenol helps more.  Family member at bedside.    Pain Scale Score	At rest: _9/10_ 	With Activity: ___ 	[X ] Refer to charted pain scores    THERAPY:    [ ] IV PCA Morphine		[ ] 5 mg/mL	[ ] 1 mg/mL  [X ] IV PCA Hydromorphone	[ ] 5 mg/mL	[X ] 1 mg/mL  [ ] IV PCA Fentanyl		[ ] 50 micrograms/mL    Demand dose __0.2_ lockout __6_ (minutes) Continuous Rate _0__ Total: _7.05__   mg used (in past 24 hrs)      MEDICATIONS  (STANDING):  acetaminophen  IVPB .. 1000 milliGRAM(s) IV Intermittent once  acetaminophen  IVPB .. 1000 milliGRAM(s) IV Intermittent once  benzocaine 15 mG/menthol 3.6 mG Lozenge 1 Lozenge Oral every 6 hours  dextrose 5% + sodium chloride 0.45% with potassium chloride 20 mEq/L 1000 milliLiter(s) (100 mL/Hr) IV Continuous <Continuous>  dextrose 5%. 1000 milliLiter(s) (50 mL/Hr) IV Continuous <Continuous>  dextrose 50% Injectable 12.5 Gram(s) IV Push once  dextrose 50% Injectable 25 Gram(s) IV Push once  dextrose 50% Injectable 25 Gram(s) IV Push once  heparin  Injectable 5000 Unit(s) SubCutaneous every 8 hours  insulin lispro (HumaLOG) corrective regimen sliding scale   SubCutaneous every 6 hours  ketorolac   Injectable 15 milliGRAM(s) IV Push every 6 hours  pantoprazole  Injectable 40 milliGRAM(s) IV Push daily  piperacillin/tazobactam IVPB. 3.375 Gram(s) IV Intermittent every 8 hours    MEDICATIONS  (PRN):  benzocaine 15 mG/menthol 3.6 mG Lozenge 1 Lozenge Oral every 4 hours PRN Sore Throat  dextrose 40% Gel 15 Gram(s) Oral once PRN Blood Glucose LESS THAN 70 milliGRAM(s)/deciliter  glucagon  Injectable 1 milliGRAM(s) IntraMuscular once PRN Glucose LESS THAN 70 milligrams/deciliter  HYDROmorphone  Injectable 0.5 milliGRAM(s) IV Push every 4 hours PRN Moderate Pain (4 - 6)  HYDROmorphone  Injectable 1 milliGRAM(s) IV Push every 4 hours PRN Severe Pain (7 - 10)  naloxone Injectable 0.1 milliGRAM(s) IV Push every 3 minutes PRN For ANY of the following changes in patient status:  A. RR LESS THAN 10 breaths per minute, B. Oxygen saturation LESS THAN 90%, C. Sedation score of 6  ondansetron Injectable 4 milliGRAM(s) IV Push every 6 hours PRN Nausea      OBJECTIVE:  Patient lying in bed.    Sedation Score:	[ X] Alert	[ ] Drowsy 	[ ] Arousable	[ ] Asleep	[ ] Unresponsive    Side Effects:	[X ] None	[ ] Nausea	[ ] Vomiting	[ ] Pruritus  		[ ] Other:    Vital Signs Last 24 Hrs  T(C): 36.7 (15 Jameel 2019 10:38), Max: 37.3 (15 Jameel 2019 00:45)  T(F): 98.1 (15 Jameel 2019 10:38), Max: 99.1 (15 Jameel 2019 00:45)  HR: 91 (15 Jameel 2019 10:38) (80 - 96)  BP: 102/50 (15 Jameel 2019 10:38) (102/50 - 133/70)  BP(mean): --  RR: 17 (15 Jameel 2019 05:22) (14 - 17)  SpO2: 100% (15 Jameel 2019 05:22) (96% - 100%)    ASSESSMENT/ PLAN    Therapy to  be:	[ ] Continue   [ X] Discontinued   [X ] Change to prn Analgesics    Documentation and Verification of current medications:   [X] Done	[ ] Not done, not elligible    Comments: Team discontinued IV PCA and ordered PRN Oral/IV opioids and/or Adjuvant non-opioid medication.

## 2019-01-15 NOTE — DIETITIAN INITIAL EVALUATION ADULT. - ENERGY NEEDS
Height (cm): 152.4 (11 Jan 2019 07:20)  Weight (kg): 57.4 (11 Jan 2019 07:20)  BMI (kg/m2): 24.7 (11 Jan 2019 07:20)  IBW: 45.5kg +/-10%  Skin: lap site, midline surgical incision Edema: 1+ generalized per nursing documentation

## 2019-01-15 NOTE — DIETITIAN INITIAL EVALUATION ADULT. - PERTINENT LABORATORY DATA
01-15 Na n/a   Glu n/a   K+ n/a   Cr n/a   BUN n/a   Phos n/a   Alb n/a   PAB n/a   Hgb 7.3 g/dL<L> Hct 22.8 %<L> Hemoglobin A1C, Whole Blood: 8.0 % (12-19-18 @ 10:04)

## 2019-01-15 NOTE — DIETITIAN INITIAL EVALUATION ADULT. - OTHER INFO
Initial Dietitian Evaluation 2/2 to extended length of stay. 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 Patient now s/p Whipple procedure 1/11. NG-tube removed 1/13. Daughter reports patient was eating small amounts of food prior to admission as patient would feel full quickly. Patient follows Halal diet at home. NKFA. No nausea/vomiting reported today. Prior to Whipple procedure patient reports tolerating Soft diet. Remains NPO at tis time - no flatus/BM reported per surgery resident note (1/15). Daughter notes patient has been encouraged to ambulate however patient does not want to due to abdominal pain. Provided printed materials regarding nutrition therapy s/p Whipple surgery for use when PO diet is resumed.

## 2019-01-15 NOTE — PROGRESS NOTE ADULT - SUBJECTIVE AND OBJECTIVE BOX
Surgery Progress Note    S: Patient seen and examined. No acute events overnight. Patient has been utilizing PCA more often with better pain control. - flatus, - BM. patient re-taught IS yesterday and walked to bathroom. patient encouraged to increase activity throughout the day. PCEA catheter was removed yesterday.    O:  Vital Signs Last 24 Hrs  T(C): 36.7 (15 Jameel 2019 10:38), Max: 37.3 (15 Jameel 2019 00:45)  T(F): 98.1 (15 Jameel 2019 10:38), Max: 99.1 (15 Jameel 2019 00:45)  HR: 91 (15 Jameel 2019 10:38) (80 - 96)  BP: 102/50 (15 Jameel 2019 10:38) (102/50 - 133/70)  BP(mean): --  RR: 17 (15 Jameel 2019 05:22) (14 - 17)  SpO2: 100% (15 Jameel 2019 05:22) (96% - 100%)    I&O's Detail    14 Jan 2019 07:01  -  15 Jameel 2019 07:00  --------------------------------------------------------  IN:    dextrose 5% + sodium chloride 0.45% with potassium chloride 20 mEq/L: 2200 mL    IV PiggyBack: 750 mL  Total IN: 2950 mL    OUT:    Bulb: 30 mL    Voided: 1650 mL  Total OUT: 1680 mL    Total NET: 1270 mL      15 Jameel 2019 07:01  -  15 Jameel 2019 11:29  --------------------------------------------------------  IN:  Total IN: 0 mL    OUT:  Total OUT: 0 mL    Total NET: 0 mL          MEDICATIONS  (STANDING):  acetaminophen  IVPB .. 1000 milliGRAM(s) IV Intermittent once  acetaminophen  IVPB .. 1000 milliGRAM(s) IV Intermittent once  benzocaine 15 mG/menthol 3.6 mG Lozenge 1 Lozenge Oral every 6 hours  dextrose 5% + sodium chloride 0.45% with potassium chloride 20 mEq/L 1000 milliLiter(s) (100 mL/Hr) IV Continuous <Continuous>  dextrose 5%. 1000 milliLiter(s) (50 mL/Hr) IV Continuous <Continuous>  dextrose 50% Injectable 12.5 Gram(s) IV Push once  dextrose 50% Injectable 25 Gram(s) IV Push once  dextrose 50% Injectable 25 Gram(s) IV Push once  heparin  Injectable 5000 Unit(s) SubCutaneous every 8 hours  insulin lispro (HumaLOG) corrective regimen sliding scale   SubCutaneous every 6 hours  ketorolac   Injectable 15 milliGRAM(s) IV Push every 6 hours  pantoprazole  Injectable 40 milliGRAM(s) IV Push daily  piperacillin/tazobactam IVPB. 3.375 Gram(s) IV Intermittent every 8 hours    MEDICATIONS  (PRN):  benzocaine 15 mG/menthol 3.6 mG Lozenge 1 Lozenge Oral every 4 hours PRN Sore Throat  dextrose 40% Gel 15 Gram(s) Oral once PRN Blood Glucose LESS THAN 70 milliGRAM(s)/deciliter  glucagon  Injectable 1 milliGRAM(s) IntraMuscular once PRN Glucose LESS THAN 70 milligrams/deciliter  HYDROmorphone  Injectable 0.5 milliGRAM(s) IV Push every 4 hours PRN Moderate Pain (4 - 6)  HYDROmorphone  Injectable 1 milliGRAM(s) IV Push every 4 hours PRN Severe Pain (7 - 10)  naloxone Injectable 0.1 milliGRAM(s) IV Push every 3 minutes PRN For ANY of the following changes in patient status:  A. RR LESS THAN 10 breaths per minute, B. Oxygen saturation LESS THAN 90%, C. Sedation score of 6  ondansetron Injectable 4 milliGRAM(s) IV Push every 6 hours PRN Nausea                            7.3    17.03 )-----------( 203      ( 15 Jameel 2019 10:36 )             22.8       01-15    134<L>  |  101  |  4<L>  ----------------------------<  152<H>  4.1   |  23  |  0.47<L>    Ca    8.4      15 Jameel 2019 06:10  Phos  2.3     01-15  Mg     2.0     01-15    TPro  5.8<L>  /  Alb  2.6<L>  /  TBili  1.9<H>  /  DBili  1.1<H>  /  AST  38<H>  /  ALT  50<H>  /  AlkPhos  231<H>  01-15      Physical Exam:  Gen: Laying in bed, NAD  Resp: Unlabored breathing with nasal canula  Abd: soft, NTND, midline vac to suction, RUQ JOURDAN with seropurulent output, no rebound or guarding  Ext: WWP  Skin: No rashes

## 2019-01-15 NOTE — DIETITIAN INITIAL EVALUATION ADULT. - NS AS NUTRI INTERV STRATEGIES
If patient unable to tolerate advancement to PO diet consider alternate means of nutrition as clinically appropriate in order for patient to meet nutrition needs.

## 2019-01-15 NOTE — DIETITIAN INITIAL EVALUATION ADULT. - SOURCE
family/significant other/Patient' daughter at bedside. Patient Polish speaking, declined  service when offered and preferred daughter to translate. Medical record reviewed./patient

## 2019-01-15 NOTE — DIETITIAN INITIAL EVALUATION ADULT. - ORAL INTAKE PTA
poor/Daughter reports patient was consuming very small portions of meals for the past few months due to early satiety.

## 2019-01-15 NOTE — PROGRESS NOTE ADULT - ASSESSMENT
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    - Pain control with PCA, likely d/c'd today  - NPO/IVF  - Monitor bowel function  - monitor JOURDAN output  - OOB as tolerated  - ofirmev RTC  - encourage IS  - c/w PT    Surgical Oncology (D Team)  p. [phone]

## 2019-01-15 NOTE — DIETITIAN INITIAL EVALUATION ADULT. - NS AS NUTRI INTERV MEALS SNACK
Continue NPO. As medically appropriate and tolerated consider advancing diet to Consistent Carbohydrate Clear Liquids. Assess tolerance prior to advancing diet further - Consider Soft, Consistent Carbohydrate, Low Fat diet.

## 2019-01-15 NOTE — DIETITIAN INITIAL EVALUATION ADULT. - INDICATOR
Monitor weights, labs, BM's, skin integrity. RD to remain available, Rosemary Sinclair RD, CDN Pager #[phone]

## 2019-01-16 LAB
ALBUMIN SERPL ELPH-MCNC: 2.5 G/DL — LOW (ref 3.3–5)
ALP SERPL-CCNC: 218 U/L — HIGH (ref 40–120)
ALT FLD-CCNC: 36 U/L — HIGH (ref 4–33)
ANION GAP SERPL CALC-SCNC: 11 MEQ/L — SIGNIFICANT CHANGE UP (ref 7–14)
AST SERPL-CCNC: 27 U/L — SIGNIFICANT CHANGE UP (ref 4–32)
BILIRUB DIRECT SERPL-MCNC: 1.5 MG/DL — HIGH (ref 0.1–0.2)
BILIRUB SERPL-MCNC: 2.1 MG/DL — HIGH (ref 0.2–1.2)
BLD GP AB SCN SERPL QL: NEGATIVE — SIGNIFICANT CHANGE UP
BUN SERPL-MCNC: 4 MG/DL — LOW (ref 7–23)
CALCIUM SERPL-MCNC: 8.2 MG/DL — LOW (ref 8.4–10.5)
CHLORIDE SERPL-SCNC: 100 MMOL/L — SIGNIFICANT CHANGE UP (ref 98–107)
CO2 SERPL-SCNC: 23 MMOL/L — SIGNIFICANT CHANGE UP (ref 22–31)
CREAT SERPL-MCNC: 0.61 MG/DL — SIGNIFICANT CHANGE UP (ref 0.5–1.3)
GLUCOSE SERPL-MCNC: 143 MG/DL — HIGH (ref 70–99)
HCT VFR BLD CALC: 23.3 % — LOW (ref 34.5–45)
HCT VFR BLD CALC: 27.8 % — LOW (ref 34.5–45)
HGB BLD-MCNC: 7.4 G/DL — LOW (ref 11.5–15.5)
HGB BLD-MCNC: 9.3 G/DL — LOW (ref 11.5–15.5)
MAGNESIUM SERPL-MCNC: 1.7 MG/DL — SIGNIFICANT CHANGE UP (ref 1.6–2.6)
MCHC RBC-ENTMCNC: 24.4 PG — LOW (ref 27–34)
MCHC RBC-ENTMCNC: 25.8 PG — LOW (ref 27–34)
MCHC RBC-ENTMCNC: 31.8 % — LOW (ref 32–36)
MCHC RBC-ENTMCNC: 33.5 % — SIGNIFICANT CHANGE UP (ref 32–36)
MCV RBC AUTO: 76.9 FL — LOW (ref 80–100)
MCV RBC AUTO: 77.2 FL — LOW (ref 80–100)
NRBC # FLD: 0.02 K/UL — LOW (ref 25–125)
NRBC # FLD: 0.02 K/UL — LOW (ref 25–125)
PHOSPHATE SERPL-MCNC: 2.6 MG/DL — SIGNIFICANT CHANGE UP (ref 2.5–4.5)
PLATELET # BLD AUTO: 254 K/UL — SIGNIFICANT CHANGE UP (ref 150–400)
PLATELET # BLD AUTO: 259 K/UL — SIGNIFICANT CHANGE UP (ref 150–400)
PMV BLD: 10.2 FL — SIGNIFICANT CHANGE UP (ref 7–13)
PMV BLD: 10.3 FL — SIGNIFICANT CHANGE UP (ref 7–13)
POTASSIUM SERPL-MCNC: 4.3 MMOL/L — SIGNIFICANT CHANGE UP (ref 3.5–5.3)
POTASSIUM SERPL-SCNC: 4.3 MMOL/L — SIGNIFICANT CHANGE UP (ref 3.5–5.3)
PROT SERPL-MCNC: 5.5 G/DL — LOW (ref 6–8.3)
RBC # BLD: 3.03 M/UL — LOW (ref 3.8–5.2)
RBC # BLD: 3.6 M/UL — LOW (ref 3.8–5.2)
RBC # FLD: 15.1 % — HIGH (ref 10.3–14.5)
RBC # FLD: 15.4 % — HIGH (ref 10.3–14.5)
RH IG SCN BLD-IMP: POSITIVE — SIGNIFICANT CHANGE UP
SODIUM SERPL-SCNC: 134 MMOL/L — LOW (ref 135–145)
WBC # BLD: 19.85 K/UL — HIGH (ref 3.8–10.5)
WBC # BLD: 22.19 K/UL — HIGH (ref 3.8–10.5)
WBC # FLD AUTO: 19.85 K/UL — HIGH (ref 3.8–10.5)
WBC # FLD AUTO: 22.19 K/UL — HIGH (ref 3.8–10.5)

## 2019-01-16 RX ORDER — ACETAMINOPHEN 500 MG
1000 TABLET ORAL ONCE
Qty: 0 | Refills: 0 | Status: COMPLETED | OUTPATIENT
Start: 2019-01-16 | End: 2019-01-16

## 2019-01-16 RX ORDER — ACETAMINOPHEN 500 MG
1000 TABLET ORAL ONCE
Qty: 0 | Refills: 0 | Status: COMPLETED | OUTPATIENT
Start: 2019-01-17 | End: 2019-01-17

## 2019-01-16 RX ORDER — MAGNESIUM SULFATE 500 MG/ML
1 VIAL (ML) INJECTION ONCE
Qty: 0 | Refills: 0 | Status: COMPLETED | OUTPATIENT
Start: 2019-01-16 | End: 2019-01-16

## 2019-01-16 RX ADMIN — Medication 400 MILLIGRAM(S): at 00:07

## 2019-01-16 RX ADMIN — Medication 15 MILLIGRAM(S): at 18:13

## 2019-01-16 RX ADMIN — Medication 100 GRAM(S): at 12:44

## 2019-01-16 RX ADMIN — DEXTROSE MONOHYDRATE, SODIUM CHLORIDE, AND POTASSIUM CHLORIDE 100 MILLILITER(S): 50; .745; 4.5 INJECTION, SOLUTION INTRAVENOUS at 14:44

## 2019-01-16 RX ADMIN — BENZOCAINE AND MENTHOL 1 LOZENGE: 5; 1 LIQUID ORAL at 12:43

## 2019-01-16 RX ADMIN — HEPARIN SODIUM 5000 UNIT(S): 5000 INJECTION INTRAVENOUS; SUBCUTANEOUS at 21:56

## 2019-01-16 RX ADMIN — PANTOPRAZOLE SODIUM 40 MILLIGRAM(S): 20 TABLET, DELAYED RELEASE ORAL at 12:43

## 2019-01-16 RX ADMIN — HEPARIN SODIUM 5000 UNIT(S): 5000 INJECTION INTRAVENOUS; SUBCUTANEOUS at 05:57

## 2019-01-16 RX ADMIN — Medication 1000 MILLIGRAM(S): at 14:20

## 2019-01-16 RX ADMIN — Medication 15 MILLIGRAM(S): at 12:43

## 2019-01-16 RX ADMIN — Medication 2: at 12:43

## 2019-01-16 RX ADMIN — Medication 15 MILLIGRAM(S): at 18:45

## 2019-01-16 RX ADMIN — PIPERACILLIN AND TAZOBACTAM 25 GRAM(S): 4; .5 INJECTION, POWDER, LYOPHILIZED, FOR SOLUTION INTRAVENOUS at 08:47

## 2019-01-16 RX ADMIN — Medication 400 MILLIGRAM(S): at 13:48

## 2019-01-16 RX ADMIN — Medication 15 MILLIGRAM(S): at 00:07

## 2019-01-16 RX ADMIN — Medication 15 MILLIGRAM(S): at 00:37

## 2019-01-16 RX ADMIN — Medication 400 MILLIGRAM(S): at 05:57

## 2019-01-16 RX ADMIN — Medication 15 MILLIGRAM(S): at 05:57

## 2019-01-16 RX ADMIN — PIPERACILLIN AND TAZOBACTAM 25 GRAM(S): 4; .5 INJECTION, POWDER, LYOPHILIZED, FOR SOLUTION INTRAVENOUS at 00:13

## 2019-01-16 RX ADMIN — Medication 2: at 18:13

## 2019-01-16 RX ADMIN — Medication 1000 MILLIGRAM(S): at 00:37

## 2019-01-16 RX ADMIN — DEXTROSE MONOHYDRATE, SODIUM CHLORIDE, AND POTASSIUM CHLORIDE 100 MILLILITER(S): 50; .745; 4.5 INJECTION, SOLUTION INTRAVENOUS at 21:56

## 2019-01-16 RX ADMIN — HEPARIN SODIUM 5000 UNIT(S): 5000 INJECTION INTRAVENOUS; SUBCUTANEOUS at 13:47

## 2019-01-16 RX ADMIN — Medication 15 MILLIGRAM(S): at 06:27

## 2019-01-16 RX ADMIN — Medication 63.75 MILLIMOLE(S): at 14:44

## 2019-01-16 RX ADMIN — HYDROMORPHONE HYDROCHLORIDE 1 MILLIGRAM(S): 2 INJECTION INTRAMUSCULAR; INTRAVENOUS; SUBCUTANEOUS at 23:43

## 2019-01-16 RX ADMIN — Medication 1000 MILLIGRAM(S): at 06:27

## 2019-01-16 RX ADMIN — PIPERACILLIN AND TAZOBACTAM 25 GRAM(S): 4; .5 INJECTION, POWDER, LYOPHILIZED, FOR SOLUTION INTRAVENOUS at 15:57

## 2019-01-16 NOTE — PROGRESS NOTE ADULT - SUBJECTIVE AND OBJECTIVE BOX
Surgery Progress Note    S: Patient seen and examined. No acute events overnight. Patient's PCA was d/c'd yesterday and is now on prn dilaudid with good pain control. - flatus, - BM. Patient has been OOB to chair but needs to walk more.     O:  Vital Signs Last 24 Hrs  T(C): 36.7 (16 Jan 2019 06:09), Max: 37.3 (15 Jameel 2019 21:44)  T(F): 98.1 (16 Jan 2019 06:09), Max: 99.1 (15 Jameel 2019 21:44)  HR: 83 (16 Jan 2019 06:09) (78 - 91)  BP: 97/47 (16 Jan 2019 06:09) (92/41 - 112/52)  BP(mean): --  RR: 18 (16 Jan 2019 06:09) (17 - 18)  SpO2: 100% (16 Jan 2019 06:09) (98% - 100%)    I&O's Detail    15 Jameel 2019 07:01  -  16 Jan 2019 07:00  --------------------------------------------------------  IN:    dextrose 5% + sodium chloride 0.45% with potassium chloride 20 mEq/L: 1200 mL    IV PiggyBack: 300 mL  Total IN: 1500 mL    OUT:    Bulb: 51 mL    Voided: 400 mL  Total OUT: 451 mL    Total NET: 1049 mL          MEDICATIONS  (STANDING):  benzocaine 15 mG/menthol 3.6 mG Lozenge 1 Lozenge Oral every 6 hours  dextrose 5% + sodium chloride 0.45% with potassium chloride 20 mEq/L 1000 milliLiter(s) (100 mL/Hr) IV Continuous <Continuous>  dextrose 5%. 1000 milliLiter(s) (50 mL/Hr) IV Continuous <Continuous>  dextrose 50% Injectable 12.5 Gram(s) IV Push once  dextrose 50% Injectable 25 Gram(s) IV Push once  dextrose 50% Injectable 25 Gram(s) IV Push once  heparin  Injectable 5000 Unit(s) SubCutaneous every 8 hours  insulin lispro (HumaLOG) corrective regimen sliding scale   SubCutaneous every 6 hours  ketorolac   Injectable 15 milliGRAM(s) IV Push every 6 hours  pantoprazole  Injectable 40 milliGRAM(s) IV Push daily  piperacillin/tazobactam IVPB. 3.375 Gram(s) IV Intermittent every 8 hours    MEDICATIONS  (PRN):  benzocaine 15 mG/menthol 3.6 mG Lozenge 1 Lozenge Oral every 4 hours PRN Sore Throat  dextrose 40% Gel 15 Gram(s) Oral once PRN Blood Glucose LESS THAN 70 milliGRAM(s)/deciliter  glucagon  Injectable 1 milliGRAM(s) IntraMuscular once PRN Glucose LESS THAN 70 milligrams/deciliter  HYDROmorphone  Injectable 0.5 milliGRAM(s) IV Push every 4 hours PRN Moderate Pain (4 - 6)  HYDROmorphone  Injectable 1 milliGRAM(s) IV Push every 4 hours PRN Severe Pain (7 - 10)                            7.4    19.85 )-----------( 259      ( 16 Jan 2019 04:40 )             23.3       01-16    134<L>  |  100  |  4<L>  ----------------------------<  143<H>  4.3   |  23  |  0.61    Ca    8.2<L>      16 Jan 2019 04:40  Phos  2.6     01-16  Mg     1.7     01-16    TPro  5.5<L>  /  Alb  2.5<L>  /  TBili  2.1<H>  /  DBili  1.5<H>  /  AST  27  /  ALT  36<H>  /  AlkPhos  218<H>  01-16      Physical Exam:  Gen: Laying in bed, NAD  Resp: Unlabored breathing with nasal canula  Abd: soft, TTP around prevena, ND, midline vac to suction, RUQ JOURDAN with serous output, no rebound or guarding  Ext: WWP  Skin: No rashes Surgery Progress Note    S: Patient seen and examined. No acute events overnight. Patient's PCA was d/c'd yesterday and is now on prn dilaudid with good pain control. - flatus, - BM. Patient has been OOB to chair but needs to walk more. Pt found to have low H/H, however has remained stable. Patient will receive 1u PRBC today.     O:  Vital Signs Last 24 Hrs  T(C): 36.7 (16 Jan 2019 06:09), Max: 37.3 (15 Jameel 2019 21:44)  T(F): 98.1 (16 Jan 2019 06:09), Max: 99.1 (15 Jameel 2019 21:44)  HR: 83 (16 Jan 2019 06:09) (78 - 91)  BP: 97/47 (16 Jan 2019 06:09) (92/41 - 112/52)  BP(mean): --  RR: 18 (16 Jan 2019 06:09) (17 - 18)  SpO2: 100% (16 Jan 2019 06:09) (98% - 100%)    I&O's Detail    15 Jameel 2019 07:01  -  16 Jan 2019 07:00  --------------------------------------------------------  IN:    dextrose 5% + sodium chloride 0.45% with potassium chloride 20 mEq/L: 1200 mL    IV PiggyBack: 300 mL  Total IN: 1500 mL    OUT:    Bulb: 51 mL    Voided: 400 mL  Total OUT: 451 mL    Total NET: 1049 mL          MEDICATIONS  (STANDING):  benzocaine 15 mG/menthol 3.6 mG Lozenge 1 Lozenge Oral every 6 hours  dextrose 5% + sodium chloride 0.45% with potassium chloride 20 mEq/L 1000 milliLiter(s) (100 mL/Hr) IV Continuous <Continuous>  dextrose 5%. 1000 milliLiter(s) (50 mL/Hr) IV Continuous <Continuous>  dextrose 50% Injectable 12.5 Gram(s) IV Push once  dextrose 50% Injectable 25 Gram(s) IV Push once  dextrose 50% Injectable 25 Gram(s) IV Push once  heparin  Injectable 5000 Unit(s) SubCutaneous every 8 hours  insulin lispro (HumaLOG) corrective regimen sliding scale   SubCutaneous every 6 hours  ketorolac   Injectable 15 milliGRAM(s) IV Push every 6 hours  pantoprazole  Injectable 40 milliGRAM(s) IV Push daily  piperacillin/tazobactam IVPB. 3.375 Gram(s) IV Intermittent every 8 hours    MEDICATIONS  (PRN):  benzocaine 15 mG/menthol 3.6 mG Lozenge 1 Lozenge Oral every 4 hours PRN Sore Throat  dextrose 40% Gel 15 Gram(s) Oral once PRN Blood Glucose LESS THAN 70 milliGRAM(s)/deciliter  glucagon  Injectable 1 milliGRAM(s) IntraMuscular once PRN Glucose LESS THAN 70 milligrams/deciliter  HYDROmorphone  Injectable 0.5 milliGRAM(s) IV Push every 4 hours PRN Moderate Pain (4 - 6)  HYDROmorphone  Injectable 1 milliGRAM(s) IV Push every 4 hours PRN Severe Pain (7 - 10)                            7.4    19.85 )-----------( 259      ( 16 Jan 2019 04:40 )             23.3       01-16    134<L>  |  100  |  4<L>  ----------------------------<  143<H>  4.3   |  23  |  0.61    Ca    8.2<L>      16 Jan 2019 04:40  Phos  2.6     01-16  Mg     1.7     01-16    TPro  5.5<L>  /  Alb  2.5<L>  /  TBili  2.1<H>  /  DBili  1.5<H>  /  AST  27  /  ALT  36<H>  /  AlkPhos  218<H>  01-16      Physical Exam:  Gen: Laying in bed, NAD  Resp: Unlabored breathing with nasal canula  Abd: soft, TTP around prevena, ND, midline vac to suction, RUQ JOURDAN with serous output, no rebound or guarding  Ext: WWP  Skin: No rashes

## 2019-01-16 NOTE — PROGRESS NOTE ADULT - ASSESSMENT
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    - pain control as needed  - NPO/IVF  - Monitor bowel function  - monitor JOURDAN output  - OOB as tolerated  - ofirmev RTC  - encourage IS  - c/w PT    Surgical Oncology (D Team)  p. [phone] 53 year old woman w/ PMH sig for invasive ampullary adenocarcinoma s/p whipple (1/11).    - pain control as needed  - will transfuse 1u PRBC  - f/u post transfusion CBC  - NPO/IVF  - Monitor bowel function  - monitor JOURDAN output  - OOB as tolerated  - ofirmev RTC  - encourage IS  - c/w PT    Surgical Oncology (D Team)  p. [phone]

## 2019-01-17 ENCOUNTER — TRANSCRIPTION ENCOUNTER (OUTPATIENT)
Age: 54
End: 2019-01-17

## 2019-01-17 LAB
ALBUMIN SERPL ELPH-MCNC: 2.2 G/DL — LOW (ref 3.3–5)
ALP SERPL-CCNC: 231 U/L — HIGH (ref 40–120)
ALT FLD-CCNC: 28 U/L — SIGNIFICANT CHANGE UP (ref 4–33)
ANION GAP SERPL CALC-SCNC: 12 MMO/L — SIGNIFICANT CHANGE UP (ref 7–14)
AST SERPL-CCNC: 26 U/L — SIGNIFICANT CHANGE UP (ref 4–32)
BILIRUB DIRECT SERPL-MCNC: 1.3 MG/DL — HIGH (ref 0.1–0.2)
BILIRUB SERPL-MCNC: 1.9 MG/DL — HIGH (ref 0.2–1.2)
BUN SERPL-MCNC: 5 MG/DL — LOW (ref 7–23)
CALCIUM SERPL-MCNC: 8.1 MG/DL — LOW (ref 8.4–10.5)
CHLORIDE SERPL-SCNC: 101 MMOL/L — SIGNIFICANT CHANGE UP (ref 98–107)
CO2 SERPL-SCNC: 22 MMOL/L — SIGNIFICANT CHANGE UP (ref 22–31)
CREAT SERPL-MCNC: 0.56 MG/DL — SIGNIFICANT CHANGE UP (ref 0.5–1.3)
GLUCOSE SERPL-MCNC: 149 MG/DL — HIGH (ref 70–99)
HCT VFR BLD CALC: 26.7 % — LOW (ref 34.5–45)
HGB BLD-MCNC: 8.8 G/DL — LOW (ref 11.5–15.5)
MAGNESIUM SERPL-MCNC: 2 MG/DL — SIGNIFICANT CHANGE UP (ref 1.6–2.6)
MCHC RBC-ENTMCNC: 25.6 PG — LOW (ref 27–34)
MCHC RBC-ENTMCNC: 33 % — SIGNIFICANT CHANGE UP (ref 32–36)
MCV RBC AUTO: 77.6 FL — LOW (ref 80–100)
NRBC # FLD: 0.03 K/UL — LOW (ref 25–125)
PHOSPHATE SERPL-MCNC: 2.1 MG/DL — LOW (ref 2.5–4.5)
PLATELET # BLD AUTO: 289 K/UL — SIGNIFICANT CHANGE UP (ref 150–400)
PMV BLD: 11.2 FL — SIGNIFICANT CHANGE UP (ref 7–13)
POTASSIUM SERPL-MCNC: 4.1 MMOL/L — SIGNIFICANT CHANGE UP (ref 3.5–5.3)
POTASSIUM SERPL-SCNC: 4.1 MMOL/L — SIGNIFICANT CHANGE UP (ref 3.5–5.3)
PROT SERPL-MCNC: 5.5 G/DL — LOW (ref 6–8.3)
RBC # BLD: 3.44 M/UL — LOW (ref 3.8–5.2)
RBC # FLD: 15.4 % — HIGH (ref 10.3–14.5)
SODIUM SERPL-SCNC: 135 MMOL/L — SIGNIFICANT CHANGE UP (ref 135–145)
WBC # BLD: 22.41 K/UL — HIGH (ref 3.8–10.5)
WBC # FLD AUTO: 22.41 K/UL — HIGH (ref 3.8–10.5)

## 2019-01-17 RX ORDER — INSULIN LISPRO 100/ML
VIAL (ML) SUBCUTANEOUS AT BEDTIME
Qty: 0 | Refills: 0 | Status: DISCONTINUED | OUTPATIENT
Start: 2019-01-17 | End: 2019-01-18

## 2019-01-17 RX ORDER — INSULIN LISPRO 100/ML
VIAL (ML) SUBCUTANEOUS
Qty: 0 | Refills: 0 | Status: DISCONTINUED | OUTPATIENT
Start: 2019-01-17 | End: 2019-01-18

## 2019-01-17 RX ORDER — ACETAMINOPHEN 500 MG
1000 TABLET ORAL ONCE
Qty: 0 | Refills: 0 | Status: COMPLETED | OUTPATIENT
Start: 2019-01-18 | End: 2019-01-18

## 2019-01-17 RX ORDER — DEXTROSE 50 % IN WATER 50 %
25 SYRINGE (ML) INTRAVENOUS ONCE
Qty: 0 | Refills: 0 | Status: DISCONTINUED | OUTPATIENT
Start: 2019-01-17 | End: 2019-01-18

## 2019-01-17 RX ORDER — GLUCAGON INJECTION, SOLUTION 0.5 MG/.1ML
1 INJECTION, SOLUTION SUBCUTANEOUS ONCE
Qty: 0 | Refills: 0 | Status: DISCONTINUED | OUTPATIENT
Start: 2019-01-17 | End: 2019-01-18

## 2019-01-17 RX ORDER — ACETAMINOPHEN 500 MG
1000 TABLET ORAL ONCE
Qty: 0 | Refills: 0 | Status: COMPLETED | OUTPATIENT
Start: 2019-01-17 | End: 2019-01-17

## 2019-01-17 RX ORDER — SODIUM CHLORIDE 9 MG/ML
1000 INJECTION, SOLUTION INTRAVENOUS
Qty: 0 | Refills: 0 | Status: DISCONTINUED | OUTPATIENT
Start: 2019-01-17 | End: 2019-01-18

## 2019-01-17 RX ORDER — DEXTROSE 50 % IN WATER 50 %
12.5 SYRINGE (ML) INTRAVENOUS ONCE
Qty: 0 | Refills: 0 | Status: DISCONTINUED | OUTPATIENT
Start: 2019-01-17 | End: 2019-01-18

## 2019-01-17 RX ORDER — DEXTROSE 50 % IN WATER 50 %
15 SYRINGE (ML) INTRAVENOUS ONCE
Qty: 0 | Refills: 0 | Status: DISCONTINUED | OUTPATIENT
Start: 2019-01-17 | End: 2019-01-18

## 2019-01-17 RX ADMIN — Medication 400 MILLIGRAM(S): at 07:55

## 2019-01-17 RX ADMIN — HEPARIN SODIUM 5000 UNIT(S): 5000 INJECTION INTRAVENOUS; SUBCUTANEOUS at 05:29

## 2019-01-17 RX ADMIN — HYDROMORPHONE HYDROCHLORIDE 1 MILLIGRAM(S): 2 INJECTION INTRAMUSCULAR; INTRAVENOUS; SUBCUTANEOUS at 22:51

## 2019-01-17 RX ADMIN — HEPARIN SODIUM 5000 UNIT(S): 5000 INJECTION INTRAVENOUS; SUBCUTANEOUS at 13:32

## 2019-01-17 RX ADMIN — Medication 63.75 MILLIMOLE(S): at 09:04

## 2019-01-17 RX ADMIN — Medication 400 MILLIGRAM(S): at 14:12

## 2019-01-17 RX ADMIN — HEPARIN SODIUM 5000 UNIT(S): 5000 INJECTION INTRAVENOUS; SUBCUTANEOUS at 21:13

## 2019-01-17 RX ADMIN — Medication 15 MILLIGRAM(S): at 23:53

## 2019-01-17 RX ADMIN — Medication 15 MILLIGRAM(S): at 17:49

## 2019-01-17 RX ADMIN — HYDROMORPHONE HYDROCHLORIDE 1 MILLIGRAM(S): 2 INJECTION INTRAMUSCULAR; INTRAVENOUS; SUBCUTANEOUS at 23:21

## 2019-01-17 RX ADMIN — HYDROMORPHONE HYDROCHLORIDE 1 MILLIGRAM(S): 2 INJECTION INTRAMUSCULAR; INTRAVENOUS; SUBCUTANEOUS at 00:13

## 2019-01-17 RX ADMIN — Medication 1000 MILLIGRAM(S): at 21:43

## 2019-01-17 RX ADMIN — PIPERACILLIN AND TAZOBACTAM 25 GRAM(S): 4; .5 INJECTION, POWDER, LYOPHILIZED, FOR SOLUTION INTRAVENOUS at 23:53

## 2019-01-17 RX ADMIN — Medication 1000 MILLIGRAM(S): at 01:32

## 2019-01-17 RX ADMIN — PIPERACILLIN AND TAZOBACTAM 25 GRAM(S): 4; .5 INJECTION, POWDER, LYOPHILIZED, FOR SOLUTION INTRAVENOUS at 17:49

## 2019-01-17 RX ADMIN — Medication 2: at 06:09

## 2019-01-17 RX ADMIN — Medication 15 MILLIGRAM(S): at 01:01

## 2019-01-17 RX ADMIN — Medication 15 MILLIGRAM(S): at 11:11

## 2019-01-17 RX ADMIN — HYDROMORPHONE HYDROCHLORIDE 1 MILLIGRAM(S): 2 INJECTION INTRAMUSCULAR; INTRAVENOUS; SUBCUTANEOUS at 15:40

## 2019-01-17 RX ADMIN — Medication 400 MILLIGRAM(S): at 01:02

## 2019-01-17 RX ADMIN — PANTOPRAZOLE SODIUM 40 MILLIGRAM(S): 20 TABLET, DELAYED RELEASE ORAL at 11:11

## 2019-01-17 RX ADMIN — HYDROMORPHONE HYDROCHLORIDE 1 MILLIGRAM(S): 2 INJECTION INTRAMUSCULAR; INTRAVENOUS; SUBCUTANEOUS at 15:33

## 2019-01-17 RX ADMIN — Medication 15 MILLIGRAM(S): at 05:29

## 2019-01-17 RX ADMIN — PIPERACILLIN AND TAZOBACTAM 25 GRAM(S): 4; .5 INJECTION, POWDER, LYOPHILIZED, FOR SOLUTION INTRAVENOUS at 01:01

## 2019-01-17 RX ADMIN — Medication 15 MILLIGRAM(S): at 05:59

## 2019-01-17 RX ADMIN — PIPERACILLIN AND TAZOBACTAM 25 GRAM(S): 4; .5 INJECTION, POWDER, LYOPHILIZED, FOR SOLUTION INTRAVENOUS at 10:13

## 2019-01-17 RX ADMIN — Medication 400 MILLIGRAM(S): at 21:13

## 2019-01-17 RX ADMIN — DEXTROSE MONOHYDRATE, SODIUM CHLORIDE, AND POTASSIUM CHLORIDE 100 MILLILITER(S): 50; .745; 4.5 INJECTION, SOLUTION INTRAVENOUS at 19:56

## 2019-01-17 RX ADMIN — Medication 15 MILLIGRAM(S): at 01:31

## 2019-01-17 NOTE — PROGRESS NOTE ADULT - ASSESSMENT
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    - pain control as needed  - advance to CLD  - Monitor bowel function  - monitor JOURDAN output  - OOB as tolerated  - encourage IS  - c/w PT    Surgical Oncology (D Team)  p. [phone]

## 2019-01-17 NOTE — PROGRESS NOTE ADULT - SUBJECTIVE AND OBJECTIVE BOX
Surgery Progress Note    S: Patient seen and examined. No acute events overnight. Patient's pain is well controlled. + flatus, + BM. Patient has been OOB and ambulating well with a walker. Patient denies n/v. Patient was transfused 1u PRBC yesterday and responded well.     O:  Vital Signs Last 24 Hrs  T(C): 36.7 (17 Jan 2019 07:53), Max: 37.2 (17 Jan 2019 01:13)  T(F): 98 (17 Jan 2019 07:53), Max: 99 (17 Jan 2019 01:13)  HR: 86 (17 Jan 2019 07:53) (84 - 99)  BP: 108/53 (17 Jan 2019 07:53) (99/55 - 114/51)  BP(mean): --  RR: 18 (17 Jan 2019 07:53) (18 - 20)  SpO2: 98% (17 Jan 2019 07:53) (94% - 100%)    I&O's Detail    16 Jan 2019 07:01  -  17 Jan 2019 07:00  --------------------------------------------------------  IN:    dextrose 5% + sodium chloride 0.45% with potassium chloride 20 mEq/L: 2000 mL    IV PiggyBack: 275 mL  Total IN: 2275 mL    OUT:    Bulb: 150 mL    Voided: 850 mL  Total OUT: 1000 mL    Total NET: 1275 mL          MEDICATIONS  (STANDING):  benzocaine 15 mG/menthol 3.6 mG Lozenge 1 Lozenge Oral every 6 hours  dextrose 5% + sodium chloride 0.45% with potassium chloride 20 mEq/L 1000 milliLiter(s) (100 mL/Hr) IV Continuous <Continuous>  dextrose 5%. 1000 milliLiter(s) (50 mL/Hr) IV Continuous <Continuous>  dextrose 50% Injectable 12.5 Gram(s) IV Push once  dextrose 50% Injectable 25 Gram(s) IV Push once  dextrose 50% Injectable 25 Gram(s) IV Push once  heparin  Injectable 5000 Unit(s) SubCutaneous every 8 hours  insulin lispro (HumaLOG) corrective regimen sliding scale   SubCutaneous every 6 hours  ketorolac   Injectable 15 milliGRAM(s) IV Push every 6 hours  pantoprazole  Injectable 40 milliGRAM(s) IV Push daily  piperacillin/tazobactam IVPB. 3.375 Gram(s) IV Intermittent every 8 hours    MEDICATIONS  (PRN):  benzocaine 15 mG/menthol 3.6 mG Lozenge 1 Lozenge Oral every 4 hours PRN Sore Throat  dextrose 40% Gel 15 Gram(s) Oral once PRN Blood Glucose LESS THAN 70 milliGRAM(s)/deciliter  glucagon  Injectable 1 milliGRAM(s) IntraMuscular once PRN Glucose LESS THAN 70 milligrams/deciliter  HYDROmorphone  Injectable 0.5 milliGRAM(s) IV Push every 4 hours PRN Moderate Pain (4 - 6)  HYDROmorphone  Injectable 1 milliGRAM(s) IV Push every 4 hours PRN Severe Pain (7 - 10)                            8.8    22.41 )-----------( 289      ( 17 Jan 2019 06:30 )             26.7       01-17    135  |  101  |  5<L>  ----------------------------<  149<H>  4.1   |  22  |  0.56    Ca    8.1<L>      17 Jan 2019 06:30  Phos  2.1     01-17  Mg     2.0     01-17    TPro  5.5<L>  /  Alb  2.2<L>  /  TBili  1.9<H>  /  DBili  1.3<H>  /  AST  26  /  ALT  28  /  AlkPhos  231<H>  01-17      Physical Exam:  Gen: Laying in bed, NAD  Resp: Unlabored breathing with nasal canula  Abd: soft, NTND, midline vac to suction, RUQ JOURDAN with serous output, no rebound or guarding  Ext: WWP  Skin: No rashes

## 2019-01-18 LAB
ALBUMIN SERPL ELPH-MCNC: 1.8 G/DL — LOW (ref 3.3–5)
ALBUMIN SERPL ELPH-MCNC: 2.2 G/DL — LOW (ref 3.3–5)
ALBUMIN SERPL ELPH-MCNC: 2.5 G/DL — LOW (ref 3.3–5)
ALP SERPL-CCNC: 148 U/L — HIGH (ref 40–120)
ALP SERPL-CCNC: 240 U/L — HIGH (ref 40–120)
ALP SERPL-CCNC: 256 U/L — HIGH (ref 40–120)
ALT FLD-CCNC: 20 U/L — SIGNIFICANT CHANGE UP (ref 4–33)
ALT FLD-CCNC: 20 U/L — SIGNIFICANT CHANGE UP (ref 4–33)
ALT FLD-CCNC: 22 U/L — SIGNIFICANT CHANGE UP (ref 4–33)
ANION GAP SERPL CALC-SCNC: 12 MMO/L — SIGNIFICANT CHANGE UP (ref 7–14)
ANION GAP SERPL CALC-SCNC: 13 MMO/L — SIGNIFICANT CHANGE UP (ref 7–14)
ANION GAP SERPL CALC-SCNC: 18 MMO/L — HIGH (ref 7–14)
ANION GAP SERPL CALC-SCNC: 26 MMO/L — HIGH (ref 7–14)
APTT BLD: 30.4 SEC — SIGNIFICANT CHANGE UP (ref 27.5–36.3)
APTT BLD: 33.4 SEC — SIGNIFICANT CHANGE UP (ref 27.5–36.3)
APTT BLD: 42 SEC — HIGH (ref 27.5–36.3)
APTT BLD: 55.3 SEC — HIGH (ref 27.5–36.3)
AST SERPL-CCNC: 22 U/L — SIGNIFICANT CHANGE UP (ref 4–32)
AST SERPL-CCNC: 22 U/L — SIGNIFICANT CHANGE UP (ref 4–32)
AST SERPL-CCNC: 26 U/L — SIGNIFICANT CHANGE UP (ref 4–32)
BASE EXCESS BLDA CALC-SCNC: -10.1 MMOL/L — SIGNIFICANT CHANGE UP
BASE EXCESS BLDA CALC-SCNC: -11.4 MMOL/L — SIGNIFICANT CHANGE UP
BASE EXCESS BLDA CALC-SCNC: -11.6 MMOL/L — SIGNIFICANT CHANGE UP
BASE EXCESS BLDA CALC-SCNC: -12.9 MMOL/L — SIGNIFICANT CHANGE UP
BASE EXCESS BLDA CALC-SCNC: -7.6 MMOL/L — SIGNIFICANT CHANGE UP
BASE EXCESS BLDA CALC-SCNC: -9 MMOL/L — SIGNIFICANT CHANGE UP
BILIRUB DIRECT SERPL-MCNC: 1.3 MG/DL — HIGH (ref 0.1–0.2)
BILIRUB SERPL-MCNC: 1.2 MG/DL — SIGNIFICANT CHANGE UP (ref 0.2–1.2)
BILIRUB SERPL-MCNC: 1.5 MG/DL — HIGH (ref 0.2–1.2)
BILIRUB SERPL-MCNC: 1.7 MG/DL — HIGH (ref 0.2–1.2)
BLD GP AB SCN SERPL QL: NEGATIVE — SIGNIFICANT CHANGE UP
BUN SERPL-MCNC: 4 MG/DL — LOW (ref 7–23)
BUN SERPL-MCNC: 5 MG/DL — LOW (ref 7–23)
CA-I BLDA-SCNC: 0.71 MMOL/L — LOW (ref 1.15–1.29)
CA-I BLDA-SCNC: 1.03 MMOL/L — LOW (ref 1.15–1.29)
CA-I BLDA-SCNC: 1.04 MMOL/L — LOW (ref 1.15–1.29)
CA-I BLDA-SCNC: 1.08 MMOL/L — LOW (ref 1.15–1.29)
CA-I BLDA-SCNC: 1.09 MMOL/L — LOW (ref 1.15–1.29)
CA-I BLDA-SCNC: 1.12 MMOL/L — LOW (ref 1.15–1.29)
CALCIUM SERPL-MCNC: 7.3 MG/DL — LOW (ref 8.4–10.5)
CALCIUM SERPL-MCNC: 7.4 MG/DL — LOW (ref 8.4–10.5)
CALCIUM SERPL-MCNC: 7.8 MG/DL — LOW (ref 8.4–10.5)
CALCIUM SERPL-MCNC: 8 MG/DL — LOW (ref 8.4–10.5)
CHLORIDE SERPL-SCNC: 101 MMOL/L — SIGNIFICANT CHANGE UP (ref 98–107)
CHLORIDE SERPL-SCNC: 98 MMOL/L — SIGNIFICANT CHANGE UP (ref 98–107)
CHLORIDE SERPL-SCNC: 99 MMOL/L — SIGNIFICANT CHANGE UP (ref 98–107)
CHLORIDE SERPL-SCNC: 99 MMOL/L — SIGNIFICANT CHANGE UP (ref 98–107)
CO2 SERPL-SCNC: 12 MMOL/L — LOW (ref 22–31)
CO2 SERPL-SCNC: 14 MMOL/L — LOW (ref 22–31)
CO2 SERPL-SCNC: 21 MMOL/L — LOW (ref 22–31)
CO2 SERPL-SCNC: 21 MMOL/L — LOW (ref 22–31)
CREAT SERPL-MCNC: 0.49 MG/DL — LOW (ref 0.5–1.3)
CREAT SERPL-MCNC: 0.52 MG/DL — SIGNIFICANT CHANGE UP (ref 0.5–1.3)
CREAT SERPL-MCNC: 0.66 MG/DL — SIGNIFICANT CHANGE UP (ref 0.5–1.3)
CREAT SERPL-MCNC: 0.66 MG/DL — SIGNIFICANT CHANGE UP (ref 0.5–1.3)
FIBRINOGEN PPP-MCNC: 389.9 MG/DL — SIGNIFICANT CHANGE UP (ref 350–510)
FIBRINOGEN PPP-MCNC: 391.2 MG/DL — SIGNIFICANT CHANGE UP (ref 350–510)
GLUCOSE BLDA-MCNC: 120 MG/DL — HIGH (ref 70–99)
GLUCOSE BLDA-MCNC: 145 MG/DL — HIGH (ref 70–99)
GLUCOSE BLDA-MCNC: 151 MG/DL — HIGH (ref 70–99)
GLUCOSE BLDA-MCNC: 151 MG/DL — HIGH (ref 70–99)
GLUCOSE BLDA-MCNC: 153 MG/DL — HIGH (ref 70–99)
GLUCOSE BLDA-MCNC: 184 MG/DL — HIGH (ref 70–99)
GLUCOSE SERPL-MCNC: 159 MG/DL — HIGH (ref 70–99)
GLUCOSE SERPL-MCNC: 163 MG/DL — HIGH (ref 70–99)
GLUCOSE SERPL-MCNC: 164 MG/DL — HIGH (ref 70–99)
GLUCOSE SERPL-MCNC: 192 MG/DL — HIGH (ref 70–99)
HCO3 BLDA-SCNC: 15 MMOL/L — LOW (ref 22–26)
HCO3 BLDA-SCNC: 16 MMOL/L — LOW (ref 22–26)
HCO3 BLDA-SCNC: 17 MMOL/L — LOW (ref 22–26)
HCO3 BLDA-SCNC: 19 MMOL/L — LOW (ref 22–26)
HCT VFR BLD CALC: 18.4 % — CRITICAL LOW (ref 34.5–45)
HCT VFR BLD CALC: 24.2 % — LOW (ref 34.5–45)
HCT VFR BLD CALC: 25.1 % — LOW (ref 34.5–45)
HCT VFR BLD CALC: 25.5 % — LOW (ref 34.5–45)
HCT VFR BLD CALC: 31.9 % — LOW (ref 34.5–45)
HCT VFR BLDA CALC: 19 % — CRITICAL LOW (ref 34.5–46.5)
HCT VFR BLDA CALC: 25.6 % — LOW (ref 34.5–46.5)
HCT VFR BLDA CALC: 26.1 % — LOW (ref 34.5–46.5)
HCT VFR BLDA CALC: 28.4 % — LOW (ref 34.5–46.5)
HCT VFR BLDA CALC: 31.4 % — LOW (ref 34.5–46.5)
HCT VFR BLDA CALC: 35.1 % — SIGNIFICANT CHANGE UP (ref 34.5–46.5)
HGB BLD-MCNC: 10.8 G/DL — LOW (ref 11.5–15.5)
HGB BLD-MCNC: 6 G/DL — CRITICAL LOW (ref 11.5–15.5)
HGB BLD-MCNC: 8.1 G/DL — LOW (ref 11.5–15.5)
HGB BLD-MCNC: 8.3 G/DL — LOW (ref 11.5–15.5)
HGB BLD-MCNC: 8.4 G/DL — LOW (ref 11.5–15.5)
HGB BLDA-MCNC: 10.2 G/DL — LOW (ref 11.5–15.5)
HGB BLDA-MCNC: 11.4 G/DL — LOW (ref 11.5–15.5)
HGB BLDA-MCNC: 6 G/DL — CRITICAL LOW (ref 11.5–15.5)
HGB BLDA-MCNC: 8.2 G/DL — LOW (ref 11.5–15.5)
HGB BLDA-MCNC: 8.4 G/DL — LOW (ref 11.5–15.5)
HGB BLDA-MCNC: 9.2 G/DL — LOW (ref 11.5–15.5)
INR BLD: 1.38 — HIGH (ref 0.88–1.17)
INR BLD: 1.47 — HIGH (ref 0.88–1.17)
INR BLD: 1.65 — HIGH (ref 0.88–1.17)
INR BLD: 1.78 — HIGH (ref 0.88–1.17)
LACTATE BLDA-SCNC: 10.1 MMOL/L — CRITICAL HIGH (ref 0.5–2)
LACTATE BLDA-SCNC: 9.3 MMOL/L — CRITICAL HIGH (ref 0.5–2)
LACTATE BLDA-SCNC: 9.3 MMOL/L — CRITICAL HIGH (ref 0.5–2)
LACTATE BLDA-SCNC: 9.6 MMOL/L — CRITICAL HIGH (ref 0.5–2)
MAGNESIUM SERPL-MCNC: 1.4 MG/DL — LOW (ref 1.6–2.6)
MAGNESIUM SERPL-MCNC: 1.6 MG/DL — SIGNIFICANT CHANGE UP (ref 1.6–2.6)
MAGNESIUM SERPL-MCNC: 1.8 MG/DL — SIGNIFICANT CHANGE UP (ref 1.6–2.6)
MAGNESIUM SERPL-MCNC: 1.8 MG/DL — SIGNIFICANT CHANGE UP (ref 1.6–2.6)
MCHC RBC-ENTMCNC: 25.5 PG — LOW (ref 27–34)
MCHC RBC-ENTMCNC: 25.6 PG — LOW (ref 27–34)
MCHC RBC-ENTMCNC: 25.8 PG — LOW (ref 27–34)
MCHC RBC-ENTMCNC: 27.8 PG — SIGNIFICANT CHANGE UP (ref 27–34)
MCHC RBC-ENTMCNC: 28.6 PG — SIGNIFICANT CHANGE UP (ref 27–34)
MCHC RBC-ENTMCNC: 32.6 % — SIGNIFICANT CHANGE UP (ref 32–36)
MCHC RBC-ENTMCNC: 32.9 % — SIGNIFICANT CHANGE UP (ref 32–36)
MCHC RBC-ENTMCNC: 33.1 % — SIGNIFICANT CHANGE UP (ref 32–36)
MCHC RBC-ENTMCNC: 33.5 % — SIGNIFICANT CHANGE UP (ref 32–36)
MCHC RBC-ENTMCNC: 33.9 % — SIGNIFICANT CHANGE UP (ref 32–36)
MCV RBC AUTO: 77.7 FL — LOW (ref 80–100)
MCV RBC AUTO: 78 FL — LOW (ref 80–100)
MCV RBC AUTO: 78.3 FL — LOW (ref 80–100)
MCV RBC AUTO: 82 FL — SIGNIFICANT CHANGE UP (ref 80–100)
MCV RBC AUTO: 85.5 FL — SIGNIFICANT CHANGE UP (ref 80–100)
NRBC # FLD: 0 K/UL — LOW (ref 25–125)
NRBC # FLD: 0 K/UL — LOW (ref 25–125)
NRBC # FLD: 0.07 K/UL — LOW (ref 25–125)
NRBC # FLD: 0.09 K/UL — LOW (ref 25–125)
NRBC # FLD: 0.1 K/UL — LOW (ref 25–125)
PCO2 BLDA: 24 MMHG — LOW (ref 32–48)
PCO2 BLDA: 25 MMHG — LOW (ref 32–48)
PCO2 BLDA: 26 MMHG — LOW (ref 32–48)
PCO2 BLDA: 31 MMHG — LOW (ref 32–48)
PCO2 BLDA: 39 MMHG — SIGNIFICANT CHANGE UP (ref 32–48)
PCO2 BLDA: 40 MMHG — SIGNIFICANT CHANGE UP (ref 32–48)
PH BLDA: 7.21 PH — LOW (ref 7.35–7.45)
PH BLDA: 7.25 PH — LOW (ref 7.35–7.45)
PH BLDA: 7.28 PH — LOW (ref 7.35–7.45)
PH BLDA: 7.31 PH — LOW (ref 7.35–7.45)
PH BLDA: 7.38 PH — SIGNIFICANT CHANGE UP (ref 7.35–7.45)
PH BLDA: 7.41 PH — SIGNIFICANT CHANGE UP (ref 7.35–7.45)
PHOSPHATE SERPL-MCNC: 2.3 MG/DL — LOW (ref 2.5–4.5)
PHOSPHATE SERPL-MCNC: 3.7 MG/DL — SIGNIFICANT CHANGE UP (ref 2.5–4.5)
PHOSPHATE SERPL-MCNC: 4.4 MG/DL — SIGNIFICANT CHANGE UP (ref 2.5–4.5)
PHOSPHATE SERPL-MCNC: 6.2 MG/DL — HIGH (ref 2.5–4.5)
PLATELET # BLD AUTO: 168 K/UL — SIGNIFICANT CHANGE UP (ref 150–400)
PLATELET # BLD AUTO: 178 K/UL — SIGNIFICANT CHANGE UP (ref 150–400)
PLATELET # BLD AUTO: 289 K/UL — SIGNIFICANT CHANGE UP (ref 150–400)
PLATELET # BLD AUTO: 296 K/UL — SIGNIFICANT CHANGE UP (ref 150–400)
PLATELET # BLD AUTO: 335 K/UL — SIGNIFICANT CHANGE UP (ref 150–400)
PMV BLD: 10 FL — SIGNIFICANT CHANGE UP (ref 7–13)
PMV BLD: 10.2 FL — SIGNIFICANT CHANGE UP (ref 7–13)
PMV BLD: 10.3 FL — SIGNIFICANT CHANGE UP (ref 7–13)
PMV BLD: 10.5 FL — SIGNIFICANT CHANGE UP (ref 7–13)
PMV BLD: 10.6 FL — SIGNIFICANT CHANGE UP (ref 7–13)
PO2 BLDA: 106 MMHG — SIGNIFICANT CHANGE UP (ref 83–108)
PO2 BLDA: 111 MMHG — HIGH (ref 83–108)
PO2 BLDA: 120 MMHG — HIGH (ref 83–108)
PO2 BLDA: 184 MMHG — HIGH (ref 83–108)
PO2 BLDA: 219 MMHG — HIGH (ref 83–108)
PO2 BLDA: 99 MMHG — SIGNIFICANT CHANGE UP (ref 83–108)
POTASSIUM BLDA-SCNC: 4 MMOL/L — SIGNIFICANT CHANGE UP (ref 3.4–4.5)
POTASSIUM BLDA-SCNC: 4.4 MMOL/L — SIGNIFICANT CHANGE UP (ref 3.4–4.5)
POTASSIUM BLDA-SCNC: 4.4 MMOL/L — SIGNIFICANT CHANGE UP (ref 3.4–4.5)
POTASSIUM BLDA-SCNC: 4.7 MMOL/L — HIGH (ref 3.4–4.5)
POTASSIUM SERPL-MCNC: 4.1 MMOL/L — SIGNIFICANT CHANGE UP (ref 3.5–5.3)
POTASSIUM SERPL-MCNC: 4.4 MMOL/L — SIGNIFICANT CHANGE UP (ref 3.5–5.3)
POTASSIUM SERPL-MCNC: 4.5 MMOL/L — SIGNIFICANT CHANGE UP (ref 3.5–5.3)
POTASSIUM SERPL-MCNC: 4.7 MMOL/L — SIGNIFICANT CHANGE UP (ref 3.5–5.3)
POTASSIUM SERPL-SCNC: 4.1 MMOL/L — SIGNIFICANT CHANGE UP (ref 3.5–5.3)
POTASSIUM SERPL-SCNC: 4.4 MMOL/L — SIGNIFICANT CHANGE UP (ref 3.5–5.3)
POTASSIUM SERPL-SCNC: 4.5 MMOL/L — SIGNIFICANT CHANGE UP (ref 3.5–5.3)
POTASSIUM SERPL-SCNC: 4.7 MMOL/L — SIGNIFICANT CHANGE UP (ref 3.5–5.3)
PROT SERPL-MCNC: 4.6 G/DL — LOW (ref 6–8.3)
PROT SERPL-MCNC: 4.9 G/DL — LOW (ref 6–8.3)
PROT SERPL-MCNC: 5.5 G/DL — LOW (ref 6–8.3)
PROTHROM AB SERPL-ACNC: 15.5 SEC — HIGH (ref 9.8–13.1)
PROTHROM AB SERPL-ACNC: 16.5 SEC — HIGH (ref 9.8–13.1)
PROTHROM AB SERPL-ACNC: 19.1 SEC — HIGH (ref 9.8–13.1)
PROTHROM AB SERPL-ACNC: 20.1 SEC — HIGH (ref 9.8–13.1)
RBC # BLD: 2.35 M/UL — LOW (ref 3.8–5.2)
RBC # BLD: 2.83 M/UL — LOW (ref 3.8–5.2)
RBC # BLD: 3.22 M/UL — LOW (ref 3.8–5.2)
RBC # BLD: 3.28 M/UL — LOW (ref 3.8–5.2)
RBC # BLD: 3.89 M/UL — SIGNIFICANT CHANGE UP (ref 3.8–5.2)
RBC # FLD: 15.7 % — HIGH (ref 10.3–14.5)
RBC # FLD: 16 % — HIGH (ref 10.3–14.5)
RBC # FLD: 16.3 % — HIGH (ref 10.3–14.5)
RBC # FLD: 16.3 % — HIGH (ref 10.3–14.5)
RBC # FLD: 16.6 % — HIGH (ref 10.3–14.5)
RH IG SCN BLD-IMP: POSITIVE — SIGNIFICANT CHANGE UP
SAO2 % BLDA: 97.3 % — SIGNIFICANT CHANGE UP (ref 95–99)
SAO2 % BLDA: 98 % — SIGNIFICANT CHANGE UP (ref 95–99)
SAO2 % BLDA: 98.6 % — SIGNIFICANT CHANGE UP (ref 95–99)
SAO2 % BLDA: 99 % — SIGNIFICANT CHANGE UP (ref 95–99)
SAO2 % BLDA: 99.3 % — HIGH (ref 95–99)
SAO2 % BLDA: 99.7 % — HIGH (ref 95–99)
SODIUM BLDA-SCNC: 128 MMOL/L — LOW (ref 136–146)
SODIUM BLDA-SCNC: 129 MMOL/L — LOW (ref 136–146)
SODIUM BLDA-SCNC: 129 MMOL/L — LOW (ref 136–146)
SODIUM BLDA-SCNC: 130 MMOL/L — LOW (ref 136–146)
SODIUM BLDA-SCNC: 131 MMOL/L — LOW (ref 136–146)
SODIUM BLDA-SCNC: 133 MMOL/L — LOW (ref 136–146)
SODIUM SERPL-SCNC: 132 MMOL/L — LOW (ref 135–145)
SODIUM SERPL-SCNC: 133 MMOL/L — LOW (ref 135–145)
SODIUM SERPL-SCNC: 133 MMOL/L — LOW (ref 135–145)
SODIUM SERPL-SCNC: 136 MMOL/L — SIGNIFICANT CHANGE UP (ref 135–145)
WBC # BLD: 18.19 K/UL — HIGH (ref 3.8–10.5)
WBC # BLD: 20.31 K/UL — HIGH (ref 3.8–10.5)
WBC # BLD: 23.02 K/UL — HIGH (ref 3.8–10.5)
WBC # BLD: 23.3 K/UL — HIGH (ref 3.8–10.5)
WBC # BLD: 35.99 K/UL — HIGH (ref 3.8–10.5)
WBC # FLD AUTO: 18.19 K/UL — HIGH (ref 3.8–10.5)
WBC # FLD AUTO: 20.31 K/UL — HIGH (ref 3.8–10.5)
WBC # FLD AUTO: 23.02 K/UL — HIGH (ref 3.8–10.5)
WBC # FLD AUTO: 23.3 K/UL — HIGH (ref 3.8–10.5)
WBC # FLD AUTO: 35.99 K/UL — HIGH (ref 3.8–10.5)

## 2019-01-18 PROCEDURE — 97606 NEG PRS WND THER DME>50 SQCM: CPT | Mod: 79

## 2019-01-18 PROCEDURE — 35840 EXPLORE ABDOMINAL VESSELS: CPT | Mod: 79

## 2019-01-18 PROCEDURE — 93010 ELECTROCARDIOGRAM REPORT: CPT

## 2019-01-18 PROCEDURE — 74018 RADEX ABDOMEN 1 VIEW: CPT | Mod: 26

## 2019-01-18 PROCEDURE — 49020 DRAINAGE ABDOM ABSCESS OPEN: CPT | Mod: 80,78

## 2019-01-18 PROCEDURE — 99291 CRITICAL CARE FIRST HOUR: CPT | Mod: 25

## 2019-01-18 PROCEDURE — 71045 X-RAY EXAM CHEST 1 VIEW: CPT | Mod: 26

## 2019-01-18 PROCEDURE — 31500 INSERT EMERGENCY AIRWAY: CPT | Mod: GC

## 2019-01-18 PROCEDURE — 44050 REDUCE BOWEL OBSTRUCTION: CPT | Mod: 79

## 2019-01-18 PROCEDURE — 99292 CRITICAL CARE ADDL 30 MIN: CPT | Mod: 25

## 2019-01-18 PROCEDURE — 36556 INSERT NON-TUNNEL CV CATH: CPT | Mod: GC

## 2019-01-18 PROCEDURE — 35840 EXPLORE ABDOMINAL VESSELS: CPT | Mod: 80,78

## 2019-01-18 PROCEDURE — 49020 DRAINAGE ABDOM ABSCESS OPEN: CPT | Mod: 59,79

## 2019-01-18 PROCEDURE — 44050 REDUCE BOWEL OBSTRUCTION: CPT | Mod: 80,78

## 2019-01-18 RX ORDER — PROPOFOL 10 MG/ML
5 INJECTION, EMULSION INTRAVENOUS
Qty: 1000 | Refills: 0 | Status: DISCONTINUED | OUTPATIENT
Start: 2019-01-18 | End: 2019-01-19

## 2019-01-18 RX ORDER — MEROPENEM 1 G/30ML
1000 INJECTION INTRAVENOUS EVERY 8 HOURS
Qty: 0 | Refills: 0 | Status: DISCONTINUED | OUTPATIENT
Start: 2019-01-19 | End: 2019-01-30

## 2019-01-18 RX ORDER — INSULIN LISPRO 100/ML
VIAL (ML) SUBCUTANEOUS EVERY 6 HOURS
Qty: 0 | Refills: 0 | Status: DISCONTINUED | OUTPATIENT
Start: 2019-01-18 | End: 2019-01-18

## 2019-01-18 RX ORDER — SODIUM CHLORIDE 9 MG/ML
1000 INJECTION, SOLUTION INTRAVENOUS
Qty: 0 | Refills: 0 | Status: DISCONTINUED | OUTPATIENT
Start: 2019-01-18 | End: 2019-01-21

## 2019-01-18 RX ORDER — CALCIUM GLUCONATE 100 MG/ML
2 VIAL (ML) INTRAVENOUS ONCE
Qty: 0 | Refills: 0 | Status: COMPLETED | OUTPATIENT
Start: 2019-01-18 | End: 2019-01-18

## 2019-01-18 RX ORDER — SODIUM CHLORIDE 9 MG/ML
1000 INJECTION, SOLUTION INTRAVENOUS ONCE
Qty: 0 | Refills: 0 | Status: COMPLETED | OUTPATIENT
Start: 2019-01-18 | End: 2019-01-19

## 2019-01-18 RX ORDER — FENTANYL CITRATE 50 UG/ML
0.5 INJECTION INTRAVENOUS
Qty: 2500 | Refills: 0 | Status: DISCONTINUED | OUTPATIENT
Start: 2019-01-18 | End: 2019-01-21

## 2019-01-18 RX ORDER — PHENYLEPHRINE HYDROCHLORIDE 10 MG/ML
0.2 INJECTION INTRAVENOUS
Qty: 160 | Refills: 0 | Status: DISCONTINUED | OUTPATIENT
Start: 2019-01-18 | End: 2019-01-19

## 2019-01-18 RX ORDER — MEROPENEM 1 G/30ML
INJECTION INTRAVENOUS
Qty: 0 | Refills: 0 | Status: DISCONTINUED | OUTPATIENT
Start: 2019-01-18 | End: 2019-01-30

## 2019-01-18 RX ORDER — INSULIN LISPRO 100/ML
VIAL (ML) SUBCUTANEOUS EVERY 6 HOURS
Qty: 0 | Refills: 0 | Status: DISCONTINUED | OUTPATIENT
Start: 2019-01-18 | End: 2019-01-25

## 2019-01-18 RX ORDER — HYDROMORPHONE HYDROCHLORIDE 2 MG/ML
0.5 INJECTION INTRAMUSCULAR; INTRAVENOUS; SUBCUTANEOUS ONCE
Qty: 0 | Refills: 0 | Status: DISCONTINUED | OUTPATIENT
Start: 2019-01-18 | End: 2019-01-18

## 2019-01-18 RX ORDER — ACETAMINOPHEN 500 MG
1000 TABLET ORAL ONCE
Qty: 0 | Refills: 0 | Status: COMPLETED | OUTPATIENT
Start: 2019-01-18 | End: 2019-01-18

## 2019-01-18 RX ORDER — FENTANYL CITRATE 50 UG/ML
0.5 INJECTION INTRAVENOUS
Qty: 2500 | Refills: 0 | Status: DISCONTINUED | OUTPATIENT
Start: 2019-01-18 | End: 2019-01-18

## 2019-01-18 RX ORDER — MEROPENEM 1 G/30ML
1000 INJECTION INTRAVENOUS ONCE
Qty: 0 | Refills: 0 | Status: COMPLETED | OUTPATIENT
Start: 2019-01-18 | End: 2019-01-18

## 2019-01-18 RX ADMIN — Medication 400 MILLIGRAM(S): at 11:05

## 2019-01-18 RX ADMIN — HEPARIN SODIUM 5000 UNIT(S): 5000 INJECTION INTRAVENOUS; SUBCUTANEOUS at 05:00

## 2019-01-18 RX ADMIN — Medication 1: at 08:46

## 2019-01-18 RX ADMIN — Medication 400 MILLIGRAM(S): at 04:42

## 2019-01-18 RX ADMIN — HYDROMORPHONE HYDROCHLORIDE 0.5 MILLIGRAM(S): 2 INJECTION INTRAMUSCULAR; INTRAVENOUS; SUBCUTANEOUS at 17:12

## 2019-01-18 RX ADMIN — PIPERACILLIN AND TAZOBACTAM 25 GRAM(S): 4; .5 INJECTION, POWDER, LYOPHILIZED, FOR SOLUTION INTRAVENOUS at 16:42

## 2019-01-18 RX ADMIN — Medication 15 MILLIGRAM(S): at 00:23

## 2019-01-18 RX ADMIN — Medication 1000 MILLIGRAM(S): at 11:19

## 2019-01-18 RX ADMIN — Medication 15 MILLIGRAM(S): at 05:30

## 2019-01-18 RX ADMIN — Medication 400 MILLIGRAM(S): at 20:09

## 2019-01-18 RX ADMIN — Medication 1000 MILLIGRAM(S): at 05:00

## 2019-01-18 RX ADMIN — HYDROMORPHONE HYDROCHLORIDE 1 MILLIGRAM(S): 2 INJECTION INTRAMUSCULAR; INTRAVENOUS; SUBCUTANEOUS at 14:40

## 2019-01-18 RX ADMIN — HYDROMORPHONE HYDROCHLORIDE 0.5 MILLIGRAM(S): 2 INJECTION INTRAMUSCULAR; INTRAVENOUS; SUBCUTANEOUS at 16:42

## 2019-01-18 RX ADMIN — Medication 2 MILLIGRAM(S): at 20:09

## 2019-01-18 RX ADMIN — HEPARIN SODIUM 5000 UNIT(S): 5000 INJECTION INTRAVENOUS; SUBCUTANEOUS at 13:25

## 2019-01-18 RX ADMIN — HYDROMORPHONE HYDROCHLORIDE 1 MILLIGRAM(S): 2 INJECTION INTRAMUSCULAR; INTRAVENOUS; SUBCUTANEOUS at 14:13

## 2019-01-18 RX ADMIN — FENTANYL CITRATE 2.87 MICROGRAM(S)/KG/HR: 50 INJECTION INTRAVENOUS at 22:30

## 2019-01-18 RX ADMIN — Medication 1: at 12:41

## 2019-01-18 RX ADMIN — Medication 15 MILLIGRAM(S): at 05:00

## 2019-01-18 RX ADMIN — Medication 1000 MILLIGRAM(S): at 20:10

## 2019-01-18 RX ADMIN — Medication 200 GRAM(S): at 20:40

## 2019-01-18 RX ADMIN — Medication 63.75 MILLIMOLE(S): at 12:40

## 2019-01-18 RX ADMIN — PANTOPRAZOLE SODIUM 40 MILLIGRAM(S): 20 TABLET, DELAYED RELEASE ORAL at 12:40

## 2019-01-18 RX ADMIN — PIPERACILLIN AND TAZOBACTAM 25 GRAM(S): 4; .5 INJECTION, POWDER, LYOPHILIZED, FOR SOLUTION INTRAVENOUS at 08:05

## 2019-01-18 NOTE — CONSULT NOTE ADULT - ATTENDING COMMENTS
The patient is a critical care patient with hemodynamic and metabolic instability in SICU.  I have personally interviewed and examined this patient, reviewed labs and x-rays, discussed with other consultants, House staff and PA's.  I spent   130  minutes  in total providing critical care for the diagnoses, assessment and plan above.  These diagnoses are unrelated to the surgical procedure noted above.  I met with family   30  min to get further history and make care decisions for this patient who is unable to participate due to altered mental status.  Time involved in performance of separately billable procedures was not counted toward my critical care time.  There is no overlap.    I was called initially to evaluate patient on floor for tachycardia and abd distension.  On my initial eval, patient normotensive but tachycardic to 140's (sinus) and with abd distended but soft.  Plan initially to obtain CT en route to SICU but we elected to take directly to SICU due to delays in the CT.  Patient's condition began to rapidly deteriorate with further abd distension, hypotension, and with vasu blood coming out of JOURDAN drain.  Labs showed significant drop in H/H.  I initiated a MTP, secured the airway, and placed a rt IJ introducer catheter.  Throughout this time, I had been discussing case with Dr Thomas at the bedside.  The plan was first to stabilize, check CT angio, then go to IR, with OR as backup.  But with her increasing abd distension and increasing airway pressures (Ppeak 40's), the decision was made to go straight to OR.  Currently (2030), patient is normotensive, has received 5-4-1 (IBES-HBP-mps), and ABG shows acceptable pH and PaO2.      I managed the following issues during my time at bedside:  R57.8 Hemorrhagic shock   D62 Acute posthemorrhagic anemia   J96.01 Acute respiratory failure with hypoxia   E87.2 Lactic acidosis

## 2019-01-18 NOTE — PROGRESS NOTE ADULT - ASSESSMENT
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    - pain control as needed  -c/w CLD, poss advance to LRD today  - Monitor bowel function  - monitor JOURDAN output  - monitor WBC  - OOB as tolerated  - encourage IS  - c/w PT    Surgical Oncology (D Team)  p. [phone]

## 2019-01-18 NOTE — CONSULT NOTE ADULT - ASSESSMENT
54y Female PMHx HTN, T2DM, asthma, depression with recent diagnosis of invasive ampullary adenocarcinoma of the CBD whom underwent an uneventful whipple 1/11/18. Patient has had an uptrending WBC since procedure, initially attributed to not restarting antibiotics for cholangitis, but presents today with abdominal tenderness/distention and tachycardia. Admitted to SICU for close hemodynamic monitoring.    Neuro:  - Pain control with Dilaudid    Resp:  - saturating well on room air, but given clinical presentation, low threshold to escalate  - Will obtain CT Chest with Abdomen/Pelvis to r/o PE or respiratory pathology    CV:  - tachycardia and hypotensive on floor, not requiring vasopressors at this time, low threshold to start vasopressor support    GI:  - Abdomen tender and distended, will obtain CT A/P now for possible leak at whipple surgical site  - NPO    Heme:   - Hct stable  - continue SQH for now    Renal:  - Strict I&O, monitor lytes, replete as needed  - adequate urine output past 24hr    ID:   - Will continue Zosyn now pending evolution of clinical picture    Endo:  - ISS for diabetes    Access: 2 PIV 18/20, 1 JOURDAN draining serosanguinous     Dispo: SICU for hemodynamic monitoring

## 2019-01-18 NOTE — CONSULT NOTE ADULT - SUBJECTIVE AND OBJECTIVE BOX
HISTORY OF PRESENT ILLNESS:  NOEMI SOUSA is a 54y Female PMHx HTN, T2DM, asthma, depression with recent diagnosis of invasive ampullary adenocarcinoma of the CBD whom underwent an uneventful whipple 1/11/18. Patient has had an uptrending WBC since procedure, initially attributed to not restarting antibiotics for cholangitis, but presents today with abdominal tenderness/distention and tachycardia. Admitted to SICU for close hemodynamic monitoring.    PAST MEDICAL HISTORY:   Adenocarcinoma of gallbladder  Type 2 diabetes mellitus without complication, without long-term current use of insulin  Neuropathy  Arthritis  Mild intermittent asthma without complication  Gastroesophageal reflux disease without esophagitis  Essential hypertension      PAST SURGICAL HISTORY: No significant past surgical history      FAMILY HISTORY: No pertinent family history in first degree relatives    ALLERGIES: No Known Allergies      VITAL SIGNS:  ICU Vital Signs Last 24 Hrs  T(C): 37 (18 Jan 2019 16:54), Max: 37.9 (18 Jan 2019 16:20)  T(F): 98.6 (18 Jan 2019 16:54), Max: 100.2 (18 Jan 2019 16:20)  HR: 132 (18 Jan 2019 16:54) (85 - 132)  BP: 104/80 (18 Jan 2019 16:54) (104/80 - 137/66)  BP(mean): --  ABP: --  ABP(mean): --  RR: 20 (18 Jan 2019 16:54) (16 - 20)  SpO2: 94% (18 Jan 2019 16:54) (94% - 99%)      NEURO  Exam: A&Ox2, moves all extremities, moderate discomfort  HYDROmorphone  Injectable 0.5 milliGRAM(s) IV Push every 4 hours PRN Moderate Pain (4 - 6)  HYDROmorphone  Injectable 1 milliGRAM(s) IV Push every 4 hours PRN Severe Pain (7 - 10)      RESPIRATORY  Exam: breathing comfortably on room air, lungs ctab, no wheezing or rhonchi    CARDIOVASCULAR  Exam: s1s2, no MRG  Cardiac Rhythm: NSR      GI/NUTRITION  Exam: abdomen has a midline incisional wound with JOURDAN draining serosanguinous fluid. She is moderately tender to palpation with associated distention. NGT is in place draining dark bilious fluid.  Diet: NPO  pantoprazole  Injectable 40 milliGRAM(s) IV Push daily      GENITOURINARY/RENAL  dextrose 5% + sodium chloride 0.45% with potassium chloride 20 mEq/L 1000 milliLiter(s) IV Continuous <Continuous>  dextrose 5%. 1000 milliLiter(s) IV Continuous <Continuous>      01-17 @ 07:01  -  01-18 @ 07:00  --------------------------------------------------------  IN:    dextrose 5% + sodium chloride 0.45% with potassium chloride 20 mEq/L: 400 mL    IV PiggyBack: 125 mL    Oral Fluid: 60 mL  Total IN: 585 mL    OUT:    Bulb: 290 mL    Voided: 950 mL  Total OUT: 1240 mL    Total NET: -655 mL      01-18 @ 07:01 - 01-18 @ 17:40  --------------------------------------------------------  IN:    dextrose 5% + sodium chloride 0.45% with potassium chloride 20 mEq/L: 700 mL  Total IN: 700 mL    OUT:    Bulb: 90 mL    Voided: 1000 mL  Total OUT: 1090 mL    Total NET: -390 mL          01-18    132<L>  |  99  |  4<L>  ----------------------------<  164<H>  4.5   |  21<L>  |  0.49<L>    Ca    7.8<L>      18 Jan 2019 14:15  Phos  3.7     01-18  Mg     1.8     01-18    TPro  5.5<L>  /  Alb  2.2<L>  /  TBili  1.7<H>  /  DBili  1.3<H>  /  AST  26  /  ALT  22  /  AlkPhos  256<H>  01-18    [ ] Swanson catheter, indication: urine output monitoring in critically ill patient    HEMATOLOGIC  [ ] VTE Prophylaxis:  heparin  Injectable 5000 Unit(s) SubCutaneous every 8 hours                          8.3    23.30 )-----------( 296      ( 18 Jan 2019 14:15 )             25.1     PT/INR - ( 18 Jan 2019 14:15 )   PT: 19.1 SEC;   INR: 1.65          PTT - ( 18 Jan 2019 14:15 )  PTT:55.3 SEC  Transfusion: [ ] PRBC	[ ] Platelets	[ ] FFP	[ ] Cryoprecipitate      INFECTIOUS DISEASES  piperacillin/tazobactam IVPB. 3.375 Gram(s) IV Intermittent every 8 hours    RECENT CULTURES:  Specimen Source: BLOOD VENOUS  Date/Time: 01-14 @ 01:20      ENDOCRINE  dextrose 40% Gel 15 Gram(s) Oral once PRN  dextrose 50% Injectable 12.5 Gram(s) IV Push once  dextrose 50% Injectable 25 Gram(s) IV Push once  dextrose 50% Injectable 25 Gram(s) IV Push once  glucagon  Injectable 1 milliGRAM(s) IntraMuscular once PRN  insulin lispro (HumaLOG) corrective regimen sliding scale   SubCutaneous three times a day before meals  insulin lispro (HumaLOG) corrective regimen sliding scale   SubCutaneous at bedtime    CAPILLARY BLOOD GLUCOSE    POCT Blood Glucose.: 152 mg/dL (18 Jan 2019 12:23)  POCT Blood Glucose.: 169 mg/dL (18 Jan 2019 08:27)  POCT Blood Glucose.: 125 mg/dL (17 Jan 2019 20:45)      PATIENT CARE ACCESS DEVICES:  [X ] Peripheral IV  [ ] Central Venous Line	[ ] R	[ ] L	[ ] IJ	[ ] Fem	[ ] SC	Placed:   [ ] Arterial Line		[ ] R	[ ] L	[ ] Fem	[ ] Rad	[ ] Ax	Placed:   [ ] PICC:					[ ] Mediport  [ ] Urinary Catheter, Date Placed:   [x] Necessity of urinary, arterial, and venous catheters discussed    OTHER MEDICATIONS: benzocaine 15 mG/menthol 3.6 mG Lozenge 1 Lozenge Oral every 4 hours PRN  benzocaine 15 mG/menthol 3.6 mG Lozenge 1 Lozenge Oral every 6 hours

## 2019-01-18 NOTE — BRIEF OPERATIVE NOTE - OPERATION/FINDINGS
Celiotomy, evacuation of hematoma, cessation of gastroduodenal bleeding with two hemostatic stiches, placement of temporary abdominal closure system (ABThera™ Negative Pressure Therapy System) Celiotomy, evacuation of hematoma, cessation of gastroduodenal artery bleeding with two hemostatic stiches, placement of temporary abdominal closure system (ABThera™ Negative Pressure Therapy System)

## 2019-01-18 NOTE — AIRWAY PLACEMENT NOTE ADULT - POST AIRWAY PLACEMENT ASSESSMENT:
CXR pending/breath sounds bilateral/positive end tidal CO2 noted/breath sounds equal
breath sounds bilateral/breath sounds equal/CXR pending/positive end tidal CO2 noted

## 2019-01-18 NOTE — AIRWAY PLACEMENT NOTE ADULT - AIRWAY COMMENTS:
SICU team at bedside performed by KAM Morin and residents
Intubated for declining clinical status, preoxygenated, medications administered, NGT was in place, grade I view seen, tube visualized passing cords

## 2019-01-18 NOTE — BRIEF OPERATIVE NOTE - PROCEDURE
<<-----Click on this checkbox to enter Procedure Temporary closure of abdominal cavity  01/18/2019    Active  [facility]ALSAL[facility]Y  Control of hemorrhage  01/18/2019    Active  [facility]ALSAL[facility]Y  Evacuation of hematoma of abdomen  01/18/2019    Active  [facility]ALSAL[facility]Y  Celiotomy  01/18/2019    Active  YALSALMAY

## 2019-01-18 NOTE — PROVIDER CONTACT NOTE (OTHER) - REASON
Midline abd incision dressing saturated in blood, JOURDAN drain output more bloody compare to morning

## 2019-01-18 NOTE — BRIEF OPERATIVE NOTE - BRIEF OP NOTE DRAINS
No new drains were placed during the second operation.   The patient had one 19 F Reece drain placed during the index operation.
Reece drain

## 2019-01-18 NOTE — PROGRESS NOTE ADULT - SUBJECTIVE AND OBJECTIVE BOX
Surgery Progress Note    S: Patient seen and examined. No acute events overnight. Patient was advanced to a CLD yesterday and tolerated well without n/v. Pain continues to improve. + flatus, + BM. Patient OOB and ambulating with assistance and walker.     O:  Vital Signs Last 24 Hrs  T(C): 36.8 (17 Jan 2019 23:51), Max: 37.2 (17 Jan 2019 01:13)  T(F): 98.2 (17 Jan 2019 23:51), Max: 99 (17 Jan 2019 01:13)  HR: 96 (17 Jan 2019 23:51) (84 - 99)  BP: 114/57 (17 Jan 2019 23:51) (107/56 - 122/61)  BP(mean): --  RR: 16 (17 Jan 2019 23:51) (16 - 20)  SpO2: 94% (17 Jan 2019 23:51) (94% - 99%)    I&O's Detail    16 Jan 2019 07:01  -  17 Jan 2019 07:00  --------------------------------------------------------  IN:    dextrose 5% + sodium chloride 0.45% with potassium chloride 20 mEq/L: 2000 mL    IV PiggyBack: 275 mL  Total IN: 2275 mL    OUT:    Bulb: 150 mL    Voided: 850 mL  Total OUT: 1000 mL    Total NET: 1275 mL      17 Jan 2019 07:01  -  18 Jan 2019 00:17  --------------------------------------------------------  IN:    dextrose 5% + sodium chloride 0.45% with potassium chloride 20 mEq/L: 400 mL    IV PiggyBack: 125 mL    Oral Fluid: 60 mL  Total IN: 585 mL    OUT:    Bulb: 210 mL    Voided: 950 mL  Total OUT: 1160 mL    Total NET: -575 mL          MEDICATIONS  (STANDING):  acetaminophen  IVPB .. 1000 milliGRAM(s) IV Intermittent once  acetaminophen  IVPB .. 1000 milliGRAM(s) IV Intermittent once  benzocaine 15 mG/menthol 3.6 mG Lozenge 1 Lozenge Oral every 6 hours  dextrose 5% + sodium chloride 0.45% with potassium chloride 20 mEq/L 1000 milliLiter(s) (100 mL/Hr) IV Continuous <Continuous>  dextrose 5%. 1000 milliLiter(s) (50 mL/Hr) IV Continuous <Continuous>  dextrose 50% Injectable 12.5 Gram(s) IV Push once  dextrose 50% Injectable 25 Gram(s) IV Push once  dextrose 50% Injectable 25 Gram(s) IV Push once  heparin  Injectable 5000 Unit(s) SubCutaneous every 8 hours  insulin lispro (HumaLOG) corrective regimen sliding scale   SubCutaneous three times a day before meals  insulin lispro (HumaLOG) corrective regimen sliding scale   SubCutaneous at bedtime  ketorolac   Injectable 15 milliGRAM(s) IV Push every 6 hours  pantoprazole  Injectable 40 milliGRAM(s) IV Push daily  piperacillin/tazobactam IVPB. 3.375 Gram(s) IV Intermittent every 8 hours    MEDICATIONS  (PRN):  benzocaine 15 mG/menthol 3.6 mG Lozenge 1 Lozenge Oral every 4 hours PRN Sore Throat  dextrose 40% Gel 15 Gram(s) Oral once PRN Blood Glucose LESS THAN 70 milliGRAM(s)/deciliter  glucagon  Injectable 1 milliGRAM(s) IntraMuscular once PRN Glucose LESS THAN 70 milligrams/deciliter  HYDROmorphone  Injectable 0.5 milliGRAM(s) IV Push every 4 hours PRN Moderate Pain (4 - 6)  HYDROmorphone  Injectable 1 milliGRAM(s) IV Push every 4 hours PRN Severe Pain (7 - 10)                            8.8    22.41 )-----------( 289      ( 17 Jan 2019 06:30 )             26.7       01-17    135  |  101  |  5<L>  ----------------------------<  149<H>  4.1   |  22  |  0.56    Ca    8.1<L>      17 Jan 2019 06:30  Phos  2.1     01-17  Mg     2.0     01-17    TPro  5.5<L>  /  Alb  2.2<L>  /  TBili  1.9<H>  /  DBili  1.3<H>  /  AST  26  /  ALT  28  /  AlkPhos  231<H>  01-17      Physical Exam:  Gen: Laying in bed, NAD  Resp: Unlabored breathing with nasal canula  Abd: soft, NTND, midline vac to suction, RUQ JOURDAN with seropurulent output, no rebound or guarding  Ext: WWP  Skin: No rashes

## 2019-01-19 LAB
ALBUMIN SERPL ELPH-MCNC: 2.1 G/DL — LOW (ref 3.3–5)
ALBUMIN SERPL ELPH-MCNC: 2.1 G/DL — LOW (ref 3.3–5)
ALBUMIN SERPL ELPH-MCNC: 2.3 G/DL — LOW (ref 3.3–5)
ALBUMIN SERPL ELPH-MCNC: 2.5 G/DL — LOW (ref 3.3–5)
ALP SERPL-CCNC: 127 U/L — HIGH (ref 40–120)
ALP SERPL-CCNC: 131 U/L — HIGH (ref 40–120)
ALP SERPL-CCNC: 131 U/L — HIGH (ref 40–120)
ALP SERPL-CCNC: 144 U/L — HIGH (ref 40–120)
ALT FLD-CCNC: 16 U/L — SIGNIFICANT CHANGE UP (ref 4–33)
ALT FLD-CCNC: 17 U/L — SIGNIFICANT CHANGE UP (ref 4–33)
ALT FLD-CCNC: 18 U/L — SIGNIFICANT CHANGE UP (ref 4–33)
ALT FLD-CCNC: 18 U/L — SIGNIFICANT CHANGE UP (ref 4–33)
ANION GAP SERPL CALC-SCNC: 16 MMO/L — HIGH (ref 7–14)
ANION GAP SERPL CALC-SCNC: 17 MMO/L — HIGH (ref 7–14)
ANION GAP SERPL CALC-SCNC: 18 MMO/L — HIGH (ref 7–14)
ANION GAP SERPL CALC-SCNC: 19 MMO/L — HIGH (ref 7–14)
ANION GAP SERPL CALC-SCNC: 19 MMO/L — HIGH (ref 7–14)
ANION GAP SERPL CALC-SCNC: 20 MMO/L — HIGH (ref 7–14)
APTT BLD: 31 SEC — SIGNIFICANT CHANGE UP (ref 27.5–36.3)
APTT BLD: 32.3 SEC — SIGNIFICANT CHANGE UP (ref 27.5–36.3)
APTT BLD: 37.1 SEC — HIGH (ref 27.5–36.3)
AST SERPL-CCNC: 30 U/L — SIGNIFICANT CHANGE UP (ref 4–32)
AST SERPL-CCNC: 32 U/L — SIGNIFICANT CHANGE UP (ref 4–32)
AST SERPL-CCNC: 32 U/L — SIGNIFICANT CHANGE UP (ref 4–32)
AST SERPL-CCNC: 35 U/L — HIGH (ref 4–32)
BACTERIA BLD CULT: SIGNIFICANT CHANGE UP
BACTERIA BLD CULT: SIGNIFICANT CHANGE UP
BASE EXCESS BLDA CALC-SCNC: -2.9 MMOL/L — SIGNIFICANT CHANGE UP
BASE EXCESS BLDA CALC-SCNC: -3.4 MMOL/L — SIGNIFICANT CHANGE UP
BASE EXCESS BLDA CALC-SCNC: -5 MMOL/L — SIGNIFICANT CHANGE UP
BASE EXCESS BLDA CALC-SCNC: -5.7 MMOL/L — SIGNIFICANT CHANGE UP
BASE EXCESS BLDA CALC-SCNC: -5.9 MMOL/L — SIGNIFICANT CHANGE UP
BASOPHILS # BLD AUTO: 0.08 K/UL — SIGNIFICANT CHANGE UP (ref 0–0.2)
BASOPHILS NFR BLD AUTO: 0.4 % — SIGNIFICANT CHANGE UP (ref 0–2)
BILIRUB DIRECT SERPL-MCNC: 3.2 MG/DL — HIGH (ref 0.1–0.2)
BILIRUB SERPL-MCNC: 4.1 MG/DL — HIGH (ref 0.2–1.2)
BILIRUB SERPL-MCNC: 4.8 MG/DL — HIGH (ref 0.2–1.2)
BILIRUB SERPL-MCNC: 5.8 MG/DL — HIGH (ref 0.2–1.2)
BILIRUB SERPL-MCNC: 6 MG/DL — HIGH (ref 0.2–1.2)
BUN SERPL-MCNC: 4 MG/DL — LOW (ref 7–23)
BUN SERPL-MCNC: 4 MG/DL — LOW (ref 7–23)
BUN SERPL-MCNC: 5 MG/DL — LOW (ref 7–23)
CA-I BLD-SCNC: 1.01 MMOL/L — LOW (ref 1.03–1.23)
CA-I BLD-SCNC: 1.06 MMOL/L — SIGNIFICANT CHANGE UP (ref 1.03–1.23)
CA-I BLD-SCNC: 1.08 MMOL/L — SIGNIFICANT CHANGE UP (ref 1.03–1.23)
CA-I BLDA-SCNC: 1.06 MMOL/L — LOW (ref 1.15–1.29)
CA-I BLDA-SCNC: 1.09 MMOL/L — LOW (ref 1.15–1.29)
CA-I BLDA-SCNC: 1.12 MMOL/L — LOW (ref 1.15–1.29)
CALCIUM SERPL-MCNC: 7.8 MG/DL — LOW (ref 8.4–10.5)
CALCIUM SERPL-MCNC: 7.8 MG/DL — LOW (ref 8.4–10.5)
CALCIUM SERPL-MCNC: 7.9 MG/DL — LOW (ref 8.4–10.5)
CALCIUM SERPL-MCNC: 8 MG/DL — LOW (ref 8.4–10.5)
CHLORIDE BLDA-SCNC: 101 MMOL/L — SIGNIFICANT CHANGE UP (ref 96–108)
CHLORIDE BLDA-SCNC: 104 MMOL/L — SIGNIFICANT CHANGE UP (ref 96–108)
CHLORIDE SERPL-SCNC: 100 MMOL/L — SIGNIFICANT CHANGE UP (ref 98–107)
CHLORIDE SERPL-SCNC: 100 MMOL/L — SIGNIFICANT CHANGE UP (ref 98–107)
CHLORIDE SERPL-SCNC: 101 MMOL/L — SIGNIFICANT CHANGE UP (ref 98–107)
CHLORIDE SERPL-SCNC: 101 MMOL/L — SIGNIFICANT CHANGE UP (ref 98–107)
CHLORIDE SERPL-SCNC: 99 MMOL/L — SIGNIFICANT CHANGE UP (ref 98–107)
CHLORIDE SERPL-SCNC: 99 MMOL/L — SIGNIFICANT CHANGE UP (ref 98–107)
CK MB BLD-MCNC: 3.01 NG/ML — SIGNIFICANT CHANGE UP (ref 1–4.7)
CK MB BLD-MCNC: SIGNIFICANT CHANGE UP (ref 0–2.5)
CK SERPL-CCNC: 107 U/L — SIGNIFICANT CHANGE UP (ref 25–170)
CO2 SERPL-SCNC: 15 MMOL/L — LOW (ref 22–31)
CO2 SERPL-SCNC: 16 MMOL/L — LOW (ref 22–31)
CO2 SERPL-SCNC: 18 MMOL/L — LOW (ref 22–31)
CO2 SERPL-SCNC: 19 MMOL/L — LOW (ref 22–31)
CREAT SERPL-MCNC: 0.56 MG/DL — SIGNIFICANT CHANGE UP (ref 0.5–1.3)
CREAT SERPL-MCNC: 0.58 MG/DL — SIGNIFICANT CHANGE UP (ref 0.5–1.3)
CREAT SERPL-MCNC: 0.59 MG/DL — SIGNIFICANT CHANGE UP (ref 0.5–1.3)
CREAT SERPL-MCNC: 0.6 MG/DL — SIGNIFICANT CHANGE UP (ref 0.5–1.3)
CREAT SERPL-MCNC: 0.61 MG/DL — SIGNIFICANT CHANGE UP (ref 0.5–1.3)
CREAT SERPL-MCNC: 0.63 MG/DL — SIGNIFICANT CHANGE UP (ref 0.5–1.3)
EOSINOPHIL # BLD AUTO: 0.06 K/UL — SIGNIFICANT CHANGE UP (ref 0–0.5)
EOSINOPHIL NFR BLD AUTO: 0.3 % — SIGNIFICANT CHANGE UP (ref 0–6)
FIBRINOGEN PPP-MCNC: 421 MG/DL — SIGNIFICANT CHANGE UP (ref 350–510)
FIBRINOGEN PPP-MCNC: 430 MG/DL — SIGNIFICANT CHANGE UP (ref 350–510)
GLUCOSE BLDA-MCNC: 100 MG/DL — HIGH (ref 70–99)
GLUCOSE BLDA-MCNC: 104 MG/DL — HIGH (ref 70–99)
GLUCOSE BLDA-MCNC: 127 MG/DL — HIGH (ref 70–99)
GLUCOSE BLDA-MCNC: 83 MG/DL — SIGNIFICANT CHANGE UP (ref 70–99)
GLUCOSE BLDA-MCNC: 88 MG/DL — SIGNIFICANT CHANGE UP (ref 70–99)
GLUCOSE SERPL-MCNC: 103 MG/DL — HIGH (ref 70–99)
GLUCOSE SERPL-MCNC: 121 MG/DL — HIGH (ref 70–99)
GLUCOSE SERPL-MCNC: 125 MG/DL — HIGH (ref 70–99)
GLUCOSE SERPL-MCNC: 87 MG/DL — SIGNIFICANT CHANGE UP (ref 70–99)
GLUCOSE SERPL-MCNC: 87 MG/DL — SIGNIFICANT CHANGE UP (ref 70–99)
GLUCOSE SERPL-MCNC: 98 MG/DL — SIGNIFICANT CHANGE UP (ref 70–99)
HCO3 BLDA-SCNC: 20 MMOL/L — LOW (ref 22–26)
HCO3 BLDA-SCNC: 21 MMOL/L — LOW (ref 22–26)
HCO3 BLDA-SCNC: 21 MMOL/L — LOW (ref 22–26)
HCO3 BLDA-SCNC: 22 MMOL/L — SIGNIFICANT CHANGE UP (ref 22–26)
HCO3 BLDA-SCNC: 22 MMOL/L — SIGNIFICANT CHANGE UP (ref 22–26)
HCT VFR BLD CALC: 25.5 % — LOW (ref 34.5–45)
HCT VFR BLD CALC: 28.5 % — LOW (ref 34.5–45)
HCT VFR BLD CALC: 28.5 % — LOW (ref 34.5–45)
HCT VFR BLD CALC: 31 % — LOW (ref 34.5–45)
HCT VFR BLD CALC: 32.1 % — LOW (ref 34.5–45)
HCT VFR BLDA CALC: 27.9 % — LOW (ref 34.5–46.5)
HCT VFR BLDA CALC: 30.4 % — LOW (ref 34.5–46.5)
HCT VFR BLDA CALC: 31 % — LOW (ref 34.5–46.5)
HCT VFR BLDA CALC: 31.9 % — LOW (ref 34.5–46.5)
HCT VFR BLDA CALC: 33.9 % — LOW (ref 34.5–46.5)
HGB BLD-MCNC: 10.1 G/DL — LOW (ref 11.5–15.5)
HGB BLD-MCNC: 10.1 G/DL — LOW (ref 11.5–15.5)
HGB BLD-MCNC: 10.8 G/DL — LOW (ref 11.5–15.5)
HGB BLD-MCNC: 10.9 G/DL — LOW (ref 11.5–15.5)
HGB BLD-MCNC: 9 G/DL — LOW (ref 11.5–15.5)
HGB BLDA-MCNC: 10 G/DL — LOW (ref 11.5–15.5)
HGB BLDA-MCNC: 10.3 G/DL — LOW (ref 11.5–15.5)
HGB BLDA-MCNC: 11 G/DL — LOW (ref 11.5–15.5)
HGB BLDA-MCNC: 9 G/DL — LOW (ref 11.5–15.5)
HGB BLDA-MCNC: 9.8 G/DL — LOW (ref 11.5–15.5)
IMM GRANULOCYTES NFR BLD AUTO: 6.6 % — HIGH (ref 0–1.5)
INR BLD: 1.39 — HIGH (ref 0.88–1.17)
INR BLD: 1.46 — HIGH (ref 0.88–1.17)
INR BLD: 1.61 — HIGH (ref 0.88–1.17)
LACTATE BLDA-SCNC: 6.2 MMOL/L — CRITICAL HIGH (ref 0.5–2)
LACTATE BLDA-SCNC: 6.9 MMOL/L — CRITICAL HIGH (ref 0.5–2)
LACTATE BLDA-SCNC: 7.6 MMOL/L — CRITICAL HIGH (ref 0.5–2)
LACTATE BLDA-SCNC: 8 MMOL/L — CRITICAL HIGH (ref 0.5–2)
LACTATE BLDA-SCNC: 9.6 MMOL/L — CRITICAL HIGH (ref 0.5–2)
LYMPHOCYTES # BLD AUTO: 16.2 % — SIGNIFICANT CHANGE UP (ref 13–44)
LYMPHOCYTES # BLD AUTO: 3.43 K/UL — HIGH (ref 1–3.3)
MAGNESIUM SERPL-MCNC: 1.4 MG/DL — LOW (ref 1.6–2.6)
MAGNESIUM SERPL-MCNC: 1.5 MG/DL — LOW (ref 1.6–2.6)
MAGNESIUM SERPL-MCNC: 2 MG/DL — SIGNIFICANT CHANGE UP (ref 1.6–2.6)
MAGNESIUM SERPL-MCNC: 2.1 MG/DL — SIGNIFICANT CHANGE UP (ref 1.6–2.6)
MAGNESIUM SERPL-MCNC: 2.2 MG/DL — SIGNIFICANT CHANGE UP (ref 1.6–2.6)
MAGNESIUM SERPL-MCNC: 2.3 MG/DL — SIGNIFICANT CHANGE UP (ref 1.6–2.6)
MCHC RBC-ENTMCNC: 27.7 PG — SIGNIFICANT CHANGE UP (ref 27–34)
MCHC RBC-ENTMCNC: 27.7 PG — SIGNIFICANT CHANGE UP (ref 27–34)
MCHC RBC-ENTMCNC: 28.1 PG — SIGNIFICANT CHANGE UP (ref 27–34)
MCHC RBC-ENTMCNC: 28.4 PG — SIGNIFICANT CHANGE UP (ref 27–34)
MCHC RBC-ENTMCNC: 28.5 PG — SIGNIFICANT CHANGE UP (ref 27–34)
MCHC RBC-ENTMCNC: 33.6 % — SIGNIFICANT CHANGE UP (ref 32–36)
MCHC RBC-ENTMCNC: 35.2 % — SIGNIFICANT CHANGE UP (ref 32–36)
MCHC RBC-ENTMCNC: 35.3 % — SIGNIFICANT CHANGE UP (ref 32–36)
MCHC RBC-ENTMCNC: 35.4 % — SIGNIFICANT CHANGE UP (ref 32–36)
MCHC RBC-ENTMCNC: 35.4 % — SIGNIFICANT CHANGE UP (ref 32–36)
MCV RBC AUTO: 78.5 FL — LOW (ref 80–100)
MCV RBC AUTO: 79.2 FL — LOW (ref 80–100)
MCV RBC AUTO: 80.3 FL — SIGNIFICANT CHANGE UP (ref 80–100)
MCV RBC AUTO: 80.7 FL — SIGNIFICANT CHANGE UP (ref 80–100)
MCV RBC AUTO: 82.3 FL — SIGNIFICANT CHANGE UP (ref 80–100)
METHOD TYPE: SIGNIFICANT CHANGE UP
MONOCYTES # BLD AUTO: 1.2 K/UL — HIGH (ref 0–0.9)
MONOCYTES NFR BLD AUTO: 5.7 % — SIGNIFICANT CHANGE UP (ref 2–14)
NEUTROPHILS # BLD AUTO: 14.97 K/UL — HIGH (ref 1.8–7.4)
NEUTROPHILS NFR BLD AUTO: 70.8 % — SIGNIFICANT CHANGE UP (ref 43–77)
NRBC # FLD: 0.13 K/UL — LOW (ref 25–125)
NRBC # FLD: 0.13 K/UL — LOW (ref 25–125)
NRBC # FLD: 0.16 K/UL — LOW (ref 25–125)
NRBC # FLD: 0.25 K/UL — LOW (ref 25–125)
NRBC # FLD: 0.25 K/UL — LOW (ref 25–125)
NRBC FLD-RTO: 1.1 — SIGNIFICANT CHANGE UP
NRBC FLD-RTO: 1.2 — SIGNIFICANT CHANGE UP
ORGANISM # SPEC MICROSCOPIC CNT: SIGNIFICANT CHANGE UP
ORGANISM # SPEC MICROSCOPIC CNT: SIGNIFICANT CHANGE UP
PCO2 BLDA: 25 MMHG — LOW (ref 32–48)
PCO2 BLDA: 25 MMHG — LOW (ref 32–48)
PCO2 BLDA: 26 MMHG — LOW (ref 32–48)
PCO2 BLDA: 30 MMHG — LOW (ref 32–48)
PCO2 BLDA: 31 MMHG — LOW (ref 32–48)
PH BLDA: 7.43 PH — SIGNIFICANT CHANGE UP (ref 7.35–7.45)
PH BLDA: 7.45 PH — SIGNIFICANT CHANGE UP (ref 7.35–7.45)
PH BLDA: 7.46 PH — HIGH (ref 7.35–7.45)
PHOSPHATE SERPL-MCNC: 3.2 MG/DL — SIGNIFICANT CHANGE UP (ref 2.5–4.5)
PHOSPHATE SERPL-MCNC: 3.3 MG/DL — SIGNIFICANT CHANGE UP (ref 2.5–4.5)
PHOSPHATE SERPL-MCNC: 3.3 MG/DL — SIGNIFICANT CHANGE UP (ref 2.5–4.5)
PHOSPHATE SERPL-MCNC: 3.6 MG/DL — SIGNIFICANT CHANGE UP (ref 2.5–4.5)
PHOSPHATE SERPL-MCNC: 3.7 MG/DL — SIGNIFICANT CHANGE UP (ref 2.5–4.5)
PHOSPHATE SERPL-MCNC: 4.6 MG/DL — HIGH (ref 2.5–4.5)
PLATELET # BLD AUTO: 170 K/UL — SIGNIFICANT CHANGE UP (ref 150–400)
PLATELET # BLD AUTO: 171 K/UL — SIGNIFICANT CHANGE UP (ref 150–400)
PLATELET # BLD AUTO: 173 K/UL — SIGNIFICANT CHANGE UP (ref 150–400)
PLATELET # BLD AUTO: 183 K/UL — SIGNIFICANT CHANGE UP (ref 150–400)
PLATELET # BLD AUTO: 200 K/UL — SIGNIFICANT CHANGE UP (ref 150–400)
PMV BLD: 10.2 FL — SIGNIFICANT CHANGE UP (ref 7–13)
PMV BLD: 10.3 FL — SIGNIFICANT CHANGE UP (ref 7–13)
PMV BLD: 10.3 FL — SIGNIFICANT CHANGE UP (ref 7–13)
PMV BLD: 10.4 FL — SIGNIFICANT CHANGE UP (ref 7–13)
PMV BLD: 10.4 FL — SIGNIFICANT CHANGE UP (ref 7–13)
PO2 BLDA: 100 MMHG — SIGNIFICANT CHANGE UP (ref 83–108)
PO2 BLDA: 117 MMHG — HIGH (ref 83–108)
PO2 BLDA: 135 MMHG — HIGH (ref 83–108)
PO2 BLDA: 144 MMHG — HIGH (ref 83–108)
PO2 BLDA: 90 MMHG — SIGNIFICANT CHANGE UP (ref 83–108)
POTASSIUM BLDA-SCNC: 4.2 MMOL/L — SIGNIFICANT CHANGE UP (ref 3.4–4.5)
POTASSIUM BLDA-SCNC: 4.2 MMOL/L — SIGNIFICANT CHANGE UP (ref 3.4–4.5)
POTASSIUM BLDA-SCNC: 4.3 MMOL/L — SIGNIFICANT CHANGE UP (ref 3.4–4.5)
POTASSIUM BLDA-SCNC: 4.4 MMOL/L — SIGNIFICANT CHANGE UP (ref 3.4–4.5)
POTASSIUM BLDA-SCNC: 4.5 MMOL/L — SIGNIFICANT CHANGE UP (ref 3.4–4.5)
POTASSIUM SERPL-MCNC: 4.6 MMOL/L — SIGNIFICANT CHANGE UP (ref 3.5–5.3)
POTASSIUM SERPL-MCNC: 4.7 MMOL/L — SIGNIFICANT CHANGE UP (ref 3.5–5.3)
POTASSIUM SERPL-MCNC: 4.7 MMOL/L — SIGNIFICANT CHANGE UP (ref 3.5–5.3)
POTASSIUM SERPL-MCNC: 4.8 MMOL/L — SIGNIFICANT CHANGE UP (ref 3.5–5.3)
POTASSIUM SERPL-SCNC: 4.6 MMOL/L — SIGNIFICANT CHANGE UP (ref 3.5–5.3)
POTASSIUM SERPL-SCNC: 4.7 MMOL/L — SIGNIFICANT CHANGE UP (ref 3.5–5.3)
POTASSIUM SERPL-SCNC: 4.7 MMOL/L — SIGNIFICANT CHANGE UP (ref 3.5–5.3)
POTASSIUM SERPL-SCNC: 4.8 MMOL/L — SIGNIFICANT CHANGE UP (ref 3.5–5.3)
PROT SERPL-MCNC: 4.4 G/DL — LOW (ref 6–8.3)
PROT SERPL-MCNC: 4.5 G/DL — LOW (ref 6–8.3)
PROT SERPL-MCNC: 4.6 G/DL — LOW (ref 6–8.3)
PROT SERPL-MCNC: 4.8 G/DL — LOW (ref 6–8.3)
PROTHROM AB SERPL-ACNC: 16 SEC — HIGH (ref 9.8–13.1)
PROTHROM AB SERPL-ACNC: 16.8 SEC — HIGH (ref 9.8–13.1)
PROTHROM AB SERPL-ACNC: 18.1 SEC — HIGH (ref 9.8–13.1)
RBC # BLD: 3.25 M/UL — LOW (ref 3.8–5.2)
RBC # BLD: 3.55 M/UL — LOW (ref 3.8–5.2)
RBC # BLD: 3.6 M/UL — LOW (ref 3.8–5.2)
RBC # BLD: 3.84 M/UL — SIGNIFICANT CHANGE UP (ref 3.8–5.2)
RBC # BLD: 3.9 M/UL — SIGNIFICANT CHANGE UP (ref 3.8–5.2)
RBC # FLD: 15.4 % — HIGH (ref 10.3–14.5)
RBC # FLD: 15.6 % — HIGH (ref 10.3–14.5)
RBC # FLD: 15.7 % — HIGH (ref 10.3–14.5)
RBC # FLD: 15.9 % — HIGH (ref 10.3–14.5)
RBC # FLD: 16.4 % — HIGH (ref 10.3–14.5)
SAO2 % BLDA: 97.7 % — SIGNIFICANT CHANGE UP (ref 95–99)
SAO2 % BLDA: 98.1 % — SIGNIFICANT CHANGE UP (ref 95–99)
SAO2 % BLDA: 99 % — SIGNIFICANT CHANGE UP (ref 95–99)
SAO2 % BLDA: 99.1 % — HIGH (ref 95–99)
SAO2 % BLDA: 99.3 % — HIGH (ref 95–99)
SODIUM BLDA-SCNC: 128 MMOL/L — LOW (ref 136–146)
SODIUM BLDA-SCNC: 129 MMOL/L — LOW (ref 136–146)
SODIUM BLDA-SCNC: 130 MMOL/L — LOW (ref 136–146)
SODIUM BLDA-SCNC: 130 MMOL/L — LOW (ref 136–146)
SODIUM BLDA-SCNC: 131 MMOL/L — LOW (ref 136–146)
SODIUM SERPL-SCNC: 133 MMOL/L — LOW (ref 135–145)
SODIUM SERPL-SCNC: 134 MMOL/L — LOW (ref 135–145)
SODIUM SERPL-SCNC: 135 MMOL/L — SIGNIFICANT CHANGE UP (ref 135–145)
SPECIMEN SOURCE: SIGNIFICANT CHANGE UP
TROPONIN T, HIGH SENSITIVITY: 29 NG/L — SIGNIFICANT CHANGE UP (ref ?–14)
WBC # BLD: 17.4 K/UL — HIGH (ref 3.8–10.5)
WBC # BLD: 20.72 K/UL — HIGH (ref 3.8–10.5)
WBC # BLD: 21.13 K/UL — HIGH (ref 3.8–10.5)
WBC # BLD: 21.48 K/UL — HIGH (ref 3.8–10.5)
WBC # BLD: 22.31 K/UL — HIGH (ref 3.8–10.5)
WBC # FLD AUTO: 17.4 K/UL — HIGH (ref 3.8–10.5)
WBC # FLD AUTO: 20.72 K/UL — HIGH (ref 3.8–10.5)
WBC # FLD AUTO: 21.13 K/UL — HIGH (ref 3.8–10.5)
WBC # FLD AUTO: 21.48 K/UL — HIGH (ref 3.8–10.5)
WBC # FLD AUTO: 22.31 K/UL — HIGH (ref 3.8–10.5)

## 2019-01-19 PROCEDURE — 71045 X-RAY EXAM CHEST 1 VIEW: CPT | Mod: 26

## 2019-01-19 PROCEDURE — 93306 TTE W/DOPPLER COMPLETE: CPT | Mod: 26

## 2019-01-19 PROCEDURE — 99292 CRITICAL CARE ADDL 30 MIN: CPT

## 2019-01-19 PROCEDURE — 93010 ELECTROCARDIOGRAM REPORT: CPT

## 2019-01-19 PROCEDURE — 99291 CRITICAL CARE FIRST HOUR: CPT

## 2019-01-19 RX ORDER — SODIUM CHLORIDE 9 MG/ML
500 INJECTION, SOLUTION INTRAVENOUS ONCE
Qty: 0 | Refills: 0 | Status: COMPLETED | OUTPATIENT
Start: 2019-01-19 | End: 2019-01-19

## 2019-01-19 RX ORDER — MIDAZOLAM HYDROCHLORIDE 1 MG/ML
2 INJECTION, SOLUTION INTRAMUSCULAR; INTRAVENOUS ONCE
Qty: 0 | Refills: 0 | Status: DISCONTINUED | OUTPATIENT
Start: 2019-01-19 | End: 2019-01-19

## 2019-01-19 RX ORDER — ALBUMIN HUMAN 25 %
250 VIAL (ML) INTRAVENOUS ONCE
Qty: 0 | Refills: 0 | Status: COMPLETED | OUTPATIENT
Start: 2019-01-19 | End: 2019-01-19

## 2019-01-19 RX ORDER — MIDAZOLAM HYDROCHLORIDE 1 MG/ML
0.02 INJECTION, SOLUTION INTRAMUSCULAR; INTRAVENOUS
Qty: 100 | Refills: 0 | Status: DISCONTINUED | OUTPATIENT
Start: 2019-01-19 | End: 2019-01-21

## 2019-01-19 RX ORDER — NOREPINEPHRINE BITARTRATE/D5W 8 MG/250ML
0.05 PLASTIC BAG, INJECTION (ML) INTRAVENOUS
Qty: 16 | Refills: 0 | Status: DISCONTINUED | OUTPATIENT
Start: 2019-01-19 | End: 2019-01-21

## 2019-01-19 RX ORDER — MAGNESIUM SULFATE 500 MG/ML
2 VIAL (ML) INJECTION ONCE
Qty: 0 | Refills: 0 | Status: COMPLETED | OUTPATIENT
Start: 2019-01-19 | End: 2019-01-19

## 2019-01-19 RX ORDER — ALBUMIN HUMAN 25 %
500 VIAL (ML) INTRAVENOUS ONCE
Qty: 0 | Refills: 0 | Status: COMPLETED | OUTPATIENT
Start: 2019-01-19 | End: 2019-01-19

## 2019-01-19 RX ORDER — VANCOMYCIN HCL 1 G
1000 VIAL (EA) INTRAVENOUS ONCE
Qty: 0 | Refills: 0 | Status: COMPLETED | OUTPATIENT
Start: 2019-01-19 | End: 2019-01-19

## 2019-01-19 RX ORDER — VASOPRESSIN 20 [USP'U]/ML
0.04 INJECTION INTRAVENOUS
Qty: 100 | Refills: 0 | Status: DISCONTINUED | OUTPATIENT
Start: 2019-01-19 | End: 2019-01-21

## 2019-01-19 RX ORDER — CHLORHEXIDINE GLUCONATE 213 G/1000ML
1 SOLUTION TOPICAL
Qty: 0 | Refills: 0 | Status: DISCONTINUED | OUTPATIENT
Start: 2019-01-19 | End: 2019-02-15

## 2019-01-19 RX ORDER — VANCOMYCIN HCL 1 G
1000 VIAL (EA) INTRAVENOUS EVERY 12 HOURS
Qty: 0 | Refills: 0 | Status: DISCONTINUED | OUTPATIENT
Start: 2019-01-19 | End: 2019-01-27

## 2019-01-19 RX ORDER — ACETAMINOPHEN 500 MG
1000 TABLET ORAL ONCE
Qty: 0 | Refills: 0 | Status: COMPLETED | OUTPATIENT
Start: 2019-01-19 | End: 2019-01-19

## 2019-01-19 RX ORDER — VANCOMYCIN HCL 1 G
VIAL (EA) INTRAVENOUS
Qty: 0 | Refills: 0 | Status: DISCONTINUED | OUTPATIENT
Start: 2019-01-19 | End: 2019-01-27

## 2019-01-19 RX ADMIN — MIDAZOLAM HYDROCHLORIDE 2 MILLIGRAM(S): 1 INJECTION, SOLUTION INTRAMUSCULAR; INTRAVENOUS at 10:30

## 2019-01-19 RX ADMIN — SODIUM CHLORIDE 125 MILLILITER(S): 9 INJECTION, SOLUTION INTRAVENOUS at 19:32

## 2019-01-19 RX ADMIN — Medication 250 MILLILITER(S): at 15:54

## 2019-01-19 RX ADMIN — Medication 250 MILLIGRAM(S): at 18:13

## 2019-01-19 RX ADMIN — Medication 50 GRAM(S): at 02:14

## 2019-01-19 RX ADMIN — Medication 1000 MILLIGRAM(S): at 12:45

## 2019-01-19 RX ADMIN — MEROPENEM 100 MILLIGRAM(S): 1 INJECTION INTRAVENOUS at 22:44

## 2019-01-19 RX ADMIN — VASOPRESSIN 2.4 UNIT(S)/MIN: 20 INJECTION INTRAVENOUS at 19:32

## 2019-01-19 RX ADMIN — FENTANYL CITRATE 2.87 MICROGRAM(S)/KG/HR: 50 INJECTION INTRAVENOUS at 19:32

## 2019-01-19 RX ADMIN — VASOPRESSIN 2.4 UNIT(S)/MIN: 20 INJECTION INTRAVENOUS at 05:01

## 2019-01-19 RX ADMIN — Medication 500 MILLILITER(S): at 02:46

## 2019-01-19 RX ADMIN — Medication 2.69 MICROGRAM(S)/KG/MIN: at 05:01

## 2019-01-19 RX ADMIN — SODIUM CHLORIDE 125 MILLILITER(S): 9 INJECTION, SOLUTION INTRAVENOUS at 05:01

## 2019-01-19 RX ADMIN — MEROPENEM 100 MILLIGRAM(S): 1 INJECTION INTRAVENOUS at 05:00

## 2019-01-19 RX ADMIN — MEROPENEM 100 MILLIGRAM(S): 1 INJECTION INTRAVENOUS at 14:00

## 2019-01-19 RX ADMIN — SODIUM CHLORIDE 2000 MILLILITER(S): 9 INJECTION, SOLUTION INTRAVENOUS at 01:30

## 2019-01-19 RX ADMIN — PANTOPRAZOLE SODIUM 40 MILLIGRAM(S): 20 TABLET, DELAYED RELEASE ORAL at 12:39

## 2019-01-19 RX ADMIN — HEPARIN SODIUM 5000 UNIT(S): 5000 INJECTION INTRAVENOUS; SUBCUTANEOUS at 05:00

## 2019-01-19 RX ADMIN — MIDAZOLAM HYDROCHLORIDE 2 MILLIGRAM(S): 1 INJECTION, SOLUTION INTRAMUSCULAR; INTRAVENOUS at 13:00

## 2019-01-19 RX ADMIN — SODIUM CHLORIDE 4000 MILLILITER(S): 9 INJECTION, SOLUTION INTRAVENOUS at 00:22

## 2019-01-19 RX ADMIN — MIDAZOLAM HYDROCHLORIDE 1.15 MG/KG/HR: 1 INJECTION, SOLUTION INTRAMUSCULAR; INTRAVENOUS at 19:32

## 2019-01-19 RX ADMIN — Medication 2.69 MICROGRAM(S)/KG/MIN: at 19:32

## 2019-01-19 RX ADMIN — PHENYLEPHRINE HYDROCHLORIDE 2.15 MICROGRAM(S)/KG/MIN: 10 INJECTION INTRAVENOUS at 05:01

## 2019-01-19 RX ADMIN — Medication 250 MILLIGRAM(S): at 05:00

## 2019-01-19 RX ADMIN — PROPOFOL 1.72 MICROGRAM(S)/KG/MIN: 10 INJECTION, EMULSION INTRAVENOUS at 05:01

## 2019-01-19 RX ADMIN — Medication 125 MILLILITER(S): at 19:31

## 2019-01-19 RX ADMIN — Medication 400 MILLIGRAM(S): at 12:30

## 2019-01-19 NOTE — PROGRESS NOTE ADULT - ATTENDING COMMENTS
The patient is a critical care patient with hemodynamic and metabolic instability in SICU.  I have personally interviewed and examined this patient, reviewed labs and x-rays, discussed with other consultants, House staff and PA's.  I spent  90   minutes  in total providing critical care for the diagnoses, assessment and plan above.  These diagnoses are unrelated to the surgical procedure noted above.  Time involved in performance of separately billable procedures was not counted toward my critical care time.  There is no overlap.    Current Issues:  J96.01 Acute respiratory failure with hypoxia   - stable requirements, weaning FiO2 as tolerated  R57.9 Shock   - H/H stable, no signs of bleeding.  Likely SIRS/Sepsis related.  Responsive to albumin bolus.  Lactate trending down  E87.2 Lactic acidosis   - trending down  D62 Acute posthemorrhagic anemia   - H/H now stable, no signs of clinically significant bleeding on exam

## 2019-01-19 NOTE — PROGRESS NOTE ADULT - ASSESSMENT
54y Female PMHx HTN, T2DM, asthma, depression with recent diagnosis of invasive ampullary adenocarcinoma of the CBD whom underwent an uneventful whipple 1/11/18. Patient has had an uptrending WBC since procedure, initially attributed to not restarting antibiotics for cholangitis, but experienced abdominal tenderness/distention and tachycardia yesterday and was transferred to SICU for close hemodynamic monitoring. Now s/p Celiotomy, evacuation of hematoma, cessation of gastroduodenal artery bleeding, and placement of Abthera vac 1/18.    PLAN:  -continue care per SICU  -Pain control  -Monitor vs, uop  -Serial CBCs, H/H  -cont meropenem and vanc  -npo/ivf

## 2019-01-19 NOTE — PROGRESS NOTE ADULT - SUBJECTIVE AND OBJECTIVE BOX
General Surgery Progress Note    SUBJECTIVE:  The patient was seen and examined. Transferred to SICU yesterday 2/2 hemodynamic instability and underwent Celiotomy, evacuation of hematoma, cessation of gastroduodenal artery bleeding, and placement of Abthera vac last night.    OBJECTIVE:     ** VITAL SIGNS / I&O's **    Vital Signs Last 24 Hrs  T(C): 37.9 (19 Jan 2019 06:00), Max: 38.8 (18 Jan 2019 18:27)  T(F): 100.2 (19 Jan 2019 06:00), Max: 101.9 (18 Jan 2019 18:27)  HR: 81 (19 Jan 2019 07:17) (79 - 152)  BP: 114/50 (19 Jan 2019 03:20) (88/46 - 137/66)  BP(mean): 66 (19 Jan 2019 03:20) (64 - 91)  RR: 22 (19 Jan 2019 07:00) (18 - 41)  SpO2: 100% (19 Jan 2019 07:17) (94% - 100%)  Mode: AC/ CMV (Assist Control/ Continuous Mandatory Ventilation)  RR (machine): 24  TV (machine): 400  FiO2: 40  PEEP: 5  MAP: 11  PIP: 22      18 Jan 2019 07:01  -  19 Jan 2019 07:00  --------------------------------------------------------  IN:    Albumin 5%  - 250 mL: 250 mL    dextrose 5% + sodium chloride 0.45% with potassium chloride 20 mEq/L: 1000 mL    fentaNYL Infusion.: 112 mL    IV PiggyBack: 600 mL    Lactated Ringers IV Bolus: 2000 mL    lactated ringers.: 1075 mL    norepinephrine Infusion: 57 mL    Packed Red Blood Cells: 1800 mL    phenylephrine   Infusion: 20 mL    Plasma: 1000 mL    Platelets - Single Donor: 150 mL    propofol Infusion: 59 mL    vasopressin Infusion: 9.6 mL  Total IN: 8132.6 mL    OUT:    Bulb: 275 mL    Indwelling Catheter - Urethral: 455 mL    Nasoenteral Tube: 200 mL    VAC (Vacuum Assisted Closure) System: 900 mL    Voided: 1000 mL  Total OUT: 2830 mL    Total NET: 5302.6 mL          ** PHYSICAL EXAM **    -- CONSTITUTIONAL: NAD  -- PULMONARY: non-labored respirations on ventilator  -- ABDOMEN: soft, non-distended, non-tender; vac to suction; JOURDAN w/ ss output      ** LABS **                          10.1   21.13 )-----------( 183      ( 19 Jan 2019 06:28 )             28.5     19 Jan 2019 06:28    133    |  99     |  5      ----------------------------<  125    4.6     |  15     |  0.59     Ca    7.8        19 Jan 2019 06:28  Phos  3.3       19 Jan 2019 06:28  Mg     2.2       19 Jan 2019 06:28    TPro  4.4    /  Alb  2.1    /  TBili  5.8    /  DBili  x      /  AST  30     /  ALT  17     /  AlkPhos  131    19 Jan 2019 06:28    PT/INR - ( 19 Jan 2019 06:28 )   PT: 16.8 SEC;   INR: 1.46          PTT - ( 19 Jan 2019 06:28 )  PTT:32.3 SEC  CAPILLARY BLOOD GLUCOSE      POCT Blood Glucose.: 119 mg/dL (18 Jan 2019 23:23)  POCT Blood Glucose.: 201 mg/dL (18 Jan 2019 17:53)  POCT Blood Glucose.: 152 mg/dL (18 Jan 2019 12:23)  POCT Blood Glucose.: 169 mg/dL (18 Jan 2019 08:27)    CARDIAC MARKERS ( 19 Jan 2019 04:30 )  x     / x     / 107 u/L / 3.01 ng/mL / x          LIVER FUNCTIONS - ( 19 Jan 2019 06:28 )  Alb: 2.1 g/dL / Pro: 4.4 g/dL / ALK PHOS: 131 u/L / ALT: 17 u/L / AST: 30 u/L / GGT: x                 MEDICATIONS  (STANDING):  fentaNYL   Infusion. 0.5 MICROgram(s)/kG/Hr (2.87 mL/Hr) IV Continuous <Continuous>  insulin lispro (HumaLOG) corrective regimen sliding scale   SubCutaneous every 6 hours  lactated ringers. 1000 milliLiter(s) (125 mL/Hr) IV Continuous <Continuous>  meropenem  IVPB 1000 milliGRAM(s) IV Intermittent every 8 hours  meropenem  IVPB      norepinephrine Infusion 0.05 MICROgram(s)/kG/Min (2.691 mL/Hr) IV Continuous <Continuous>  pantoprazole  Injectable 40 milliGRAM(s) IV Push daily  phenylephrine    Infusion 0.2 MICROgram(s)/kG/Min (2.153 mL/Hr) IV Continuous <Continuous>  propofol Infusion 5 MICROgram(s)/kG/Min (1.722 mL/Hr) IV Continuous <Continuous>  vancomycin  IVPB 1000 milliGRAM(s) IV Intermittent every 12 hours  vancomycin  IVPB      vasopressin Infusion 0.04 Unit(s)/Min (2.4 mL/Hr) IV Continuous <Continuous>    MEDICATIONS  (PRN):

## 2019-01-19 NOTE — PROGRESS NOTE ADULT - ASSESSMENT
54y Female PMHx HTN, T2DM, asthma, depression with recent diagnosis of invasive ampullary adenocarcinoma of the CBD whom underwent an uneventful whipple 1/11/18. Patient has had an uptrending WBC since procedure, initially attributed to not restarting antibiotics for cholangitis, but presents today with abdominal tenderness/distention and tachycardia. Admitted to SICU for close hemodynamic monitoring.    Neuro:  - fentanyl and prop  -daily sedation vacation    Resp:  -currently on mecvhanical vent with fio2 of 50%  -CPAP trial as tolerated if patient remains stable   -wean fio2    CV:  - Monitor SBP, currently off vassopressors    GI:  -NPO/ngt    Heme:   -monitor H/H  -serial CBCs    Renal:  - Strict I&O, monitor lytes, replete as needed  -LR@100    ID:   - On meropenem for broadened coverage    Endo:  - ISS for diabetes    Access: RIJ cordis, 2 PIV, 1 JOURDAN draining serosanguinous, ETT, Abthera VAC    Dispo: SICU for hemodynamic monitoring       --------------------------------------------------------------------------------------    Critical Care Diagnoses: 54y Female PMHx HTN, T2DM, asthma, depression with recent diagnosis of invasive ampullary adenocarcinoma of the CBD whom underwent an uneventful whipple 1/11/18. Patient has had an uptrending WBC since procedure, initially attributed to not restarting antibiotics for cholangitis, but presents today with abdominal tenderness/distention and tachycardia. Admitted to SICU for close hemodynamic monitoring.    Neuro:  - on fentanyl and prop, wean off   -give versed prn for agitation/discomfort, can start precedex infusion  -daily sedation vacation in AM    Resp:  -wean off vent, FiO2 to 30%, RR down to 20  -CPAP trial as tolerated if patient remains stable   -ABG q6h till stabilised and then decrease frequency    CV:  - Monitor SBP, wean off pressors  -IVF resuscitation as needed   - pending ECHO     GI:  -NPO/ngt  -elevated bilirubin, direct bilirubin elevated, normal liver enzymes, trend CMP q6h with lactates till stabilised   -ct ppi ppx till NPO      Heme:   -monitor H/H, s/p MTP: monitor for bleeding, CBC q6h till stabilised   -DVT chem ppx on hold for now, ICDs in situ, start once clinically stable and not bleeding     Renal:  - Strict I&O, monitor lytes, replete as needed  - LR@125 m/hr resuscitation, transition to mIVF once stable and monitor for fluid shifts as pt is s/p MTP and large volume fluid resc     ID:   - ct meropenem for broadened coverage, no intra op cultures sent  -blood culture 1/18 f/u    Endo:  - diabetic, FS and low dose corrective regimen for hyperglycemia     Access: RIJ cordis, 2 PIV, 1 JOURDAN draining serosanguinous, ETT, Abthera VAC    Dispo: SICU for hemodynamic monitoring       --------------------------------------------------------------------------------------    Critical Care Diagnoses:

## 2019-01-19 NOTE — PROGRESS NOTE ADULT - SUBJECTIVE AND OBJECTIVE BOX
SICU Daily Progress Note  =====================================================  Interval/Overnight Events:     ***    POD #          	SICU Day #    ***    HPI:  ((insert from previous note)) ***    PMH:   ***    Allergies: No Known Allergies      MEDICATIONS:   --------------------------------------------------------------------------------------  Neurologic Medications  fentaNYL   Infusion. 0.5 MICROgram(s)/kG/Hr IV Continuous <Continuous>  propofol Infusion 5 MICROgram(s)/kG/Min IV Continuous <Continuous>    Respiratory Medications    Cardiovascular Medications  phenylephrine    Infusion 0.2 MICROgram(s)/kG/Min IV Continuous <Continuous>    Gastrointestinal Medications  lactated ringers Bolus 500 milliLiter(s) IV Bolus once  lactated ringers. 1000 milliLiter(s) IV Continuous <Continuous>  magnesium sulfate  IVPB 2 Gram(s) IV Intermittent once  pantoprazole  Injectable 40 milliGRAM(s) IV Push daily    Genitourinary Medications    Hematologic/Oncologic Medications  heparin  Injectable 5000 Unit(s) SubCutaneous every 8 hours    Antimicrobial/Immunologic Medications  meropenem  IVPB 1000 milliGRAM(s) IV Intermittent every 8 hours  meropenem  IVPB        Endocrine/Metabolic Medications  insulin lispro (HumaLOG) corrective regimen sliding scale   SubCutaneous every 6 hours    Topical/Other Medications    --------------------------------------------------------------------------------------    VITAL SIGNS, INS/OUTS (last 24 hours):  --------------------------------------------------------------------------------------  ((Insert SICU Vitals/Is+Os here))***  --------------------------------------------------------------------------------------    EXAM  NEUROLOGY  RASS:   	GCS:    Exam: Normal, NAD, alert, oriented x3, no focal deficits. ***    HEENT  Exam: Normocephalic, atraumatic, EOMI.  ***    RESPIRATORY  Exam: Lungs clear to auscultation, Normal expansion/effort. ***  Mechanical Ventilation: Mode: AC/ CMV (Assist Control/ Continuous Mandatory Ventilation), RR (machine): 24, TV (machine): 400, FiO2: 50, PEEP: 5, MAP: 11.7, PIP: 23    CARDIOVASCULAR  Exam: S1, S2.  Regular rate and rhythm.   ***    GI/NUTRITION  Exam: Abdomen soft, Non-tender, Non-distended.  ***  Wound:  ***  Current Diet:  NPO***    VASCULAR  Exam: Extremities warm, pink, well-perfused. ***    MUSCULOSKELETAL  Exam: All extremities moving spontaneously without limitations. ***    SKIN  Exam: Good skin turgor, no skin breakdown. ***    METABOLIC/FLUIDS/ELECTROLYTES  lactated ringers Bolus 500 milliLiter(s) IV Bolus once  lactated ringers. 1000 milliLiter(s) IV Continuous <Continuous>  magnesium sulfate  IVPB 2 Gram(s) IV Intermittent once      HEMATOLOGIC  [x] VTE Prophylaxis: heparin  Injectable 5000 Unit(s) SubCutaneous every 8 hours    Transfusions:	[] PRBC	[] Platelets		[] FFP	[] Cryoprecipitate    INFECTIOUS DISEASE  Antimicrobials/Immunologic Medications:  meropenem  IVPB 1000 milliGRAM(s) IV Intermittent every 8 hours  meropenem  IVPB        Day #      of     ***    Tubes/Lines/Drains  ***  [x] Peripheral IV  [] Central Venous Line     	[] R	[] L	[] IJ	[] Fem	[] SC	Date Placed:   [] Arterial Line		[] R	[] L	[] Fem	[] Rad	[] Ax	Date Placed:   [] PICC		[] Midline		[] Mediport  [] Urinary Catheter		Date Placed:   [x] Necessity of urinary, arterial, and venous catheters discussed    LABS  --------------------------------------------------------------------------------------  ((Insert SICU Labs here))***  --------------------------------------------------------------------------------------    OTHER LABORATORY:     IMAGING STUDIES:   CXR:     ASSESSMENT:  54y Female    ((insert from previous))***    PLAN:   ***  Neurologic:   Respiratory:   Cardiovascular:   Gastrointestinal/Nutrition:   Renal/Genitourinary:   Hematologic:   Infectious Disease:   Tubes/Lines/Drains:   Endocrine:   Disposition:     --------------------------------------------------------------------------------------    Critical Care Diagnoses: SICU Daily Progress Note  =====================================================  Interval/Overnight Events:     Patient was transported to SICU. Patient was febrile to 102. Cultures sent. Antibiotics changed from zosyn to meropenem. Patient was tachypneic and hypotensive to 70s systolic. CBC revealed drop in H/H to 6/19 with elevated lactate of 9. Decision was made to intubate patient. Gurinder with vasu blood. Massive Transfusion protocol initiated. Right IJ cordis placed as well as arterial line. Patient received 4PRBC 5FFP and 1platelet, and bolus 1L of fluid. Surgical team and attending notified when Peak pressures on vent were 50s and abominal distension increased. Decision was then made to take patient emergently to OR. Patient had evacuation of hematoma, cessation of gastroduodenal artery bleeding with two hemostatic stiches, and placement of abthera VAC.     Patient hypotensive post-op with low UOP. 1.5 L bolus given and valentina trac started. CBC remained stable. SVV began to increase to 18, 250 of albumin given.     POD #   1       	SICU Day #    2     HISTORY OF PRESENT ILLNESS:  NOEMI SOUSA is a 54y Female PMHx HTN, T2DM, asthma, depression with recent diagnosis of invasive ampullary adenocarcinoma of the CBD whom underwent an uneventful whipple 1/11/18. Patient has had an uptrending WBC since procedure, initially attributed to not restarting antibiotics for cholangitis, but presents today with abdominal tenderness/distention and tachycardia. Admitted to SICU for close hemodynamic monitoring.    PAST MEDICAL HISTORY:   Adenocarcinoma of gallbladder  Type 2 diabetes mellitus without complication, without long-term current use of insulin  Neuropathy  Arthritis  Mild intermittent asthma without complication  Gastroesophageal reflux disease without esophagitis  Essential hypertension      PAST SURGICAL HISTORY: No significant past surgical history      FAMILY HISTORY: No pertinent family history in first degree relatives    ALLERGIES: No Known Allergies      MEDICATIONS:   --------------------------------------------------------------------------------------  Neurologic Medications  fentaNYL   Infusion. 0.5 MICROgram(s)/kG/Hr IV Continuous <Continuous>  propofol Infusion 5 MICROgram(s)/kG/Min IV Continuous <Continuous>    Respiratory Medications    Cardiovascular Medications  phenylephrine    Infusion 0.2 MICROgram(s)/kG/Min IV Continuous <Continuous>    Gastrointestinal Medications  lactated ringers Bolus 500 milliLiter(s) IV Bolus once  lactated ringers. 1000 milliLiter(s) IV Continuous <Continuous>  magnesium sulfate  IVPB 2 Gram(s) IV Intermittent once  pantoprazole  Injectable 40 milliGRAM(s) IV Push daily    Genitourinary Medications    Hematologic/Oncologic Medications  heparin  Injectable 5000 Unit(s) SubCutaneous every 8 hours    Antimicrobial/Immunologic Medications  meropenem  IVPB 1000 milliGRAM(s) IV Intermittent every 8 hours  meropenem  IVPB        Endocrine/Metabolic Medications  insulin lispro (HumaLOG) corrective regimen sliding scale   SubCutaneous every 6 hours    Topical/Other Medications    --------------------------------------------------------------------------------------    VITAL SIGNS, INS/OUTS (last 24 hours):  --------------------------------------------------------------------------------------  ((Insert SICU Vitals/Is+Os here))***  --------------------------------------------------------------------------------------    EXAM  NEUROLOGY  RASS: 0  Exam: NAD, sedated on propofol and fentanyl     HEENT  Exam: Normocephalic, atraumatic, EOMI.      RESPIRATORY  Exam: Lungs clear to auscultation, Normal expansion/effort.   Mechanical Ventilation: Mode: AC/ CMV (Assist Control/ Continuous Mandatory Ventilation), RR (machine): 24, TV (machine): 400, FiO2: 50, PEEP: 5, MAP: 11.7, PIP: 23    CARDIOVASCULAR  Exam: S1, S2.  Regular rate and rhythm.       GI/NUTRITION  Exam: Abdomen soft, Non-tender, Non-distended.    Wound: abthera VAC in place, Reece drain in place with sanguinous drainage.     VASCULAR  Exam: Extremities warm, pink, well-perfused.     MUSCULOSKELETAL  Exam: All extremities moving spontaneously without limitations.     SKIN  Exam: Good skin turgor, no skin breakdown.     METABOLIC/FLUIDS/ELECTROLYTES  lactated ringers Bolus 500 milliLiter(s) IV Bolus once  lactated ringers. 1000 milliLiter(s) IV Continuous <Continuous>  magnesium sulfate  IVPB 2 Gram(s) IV Intermittent once      HEMATOLOGIC  [x] VTE Prophylaxis: heparin  Injectable 5000 Unit(s) SubCutaneous every 8 hours    Transfusions:	[] PRBC	[] Platelets		[] FFP	[] Cryoprecipitate    INFECTIOUS DISEASE  Antimicrobials/Immunologic Medications:  meropenem  IVPB 1000 milliGRAM(s) IV Intermittent every 8 hours  meropenem  IVPB        Day #      of     ***    Tubes/Lines/Drains  ***  [x] Peripheral IV  [x] Central Venous Line     	[x] R	[] L	[x] IJ	[] Fem	[] SC	Date Placed: 1/18/19  [x] Arterial Line		[] R	[x] L	[] Fem	[] Rad	[] Ax	Date Placed: 1/18/19  [] PICC		[] Midline		[] Mediport  [x] Urinary Catheter		Date Placed:   [x] Necessity of urinary, arterial, and venous catheters discussed    LABS  --------------------------------------------------------------------------------------  ((Insert SICU Labs here))***  --------------------------------------------------------------------------------------    OTHER LABORATORY:     IMAGING STUDIES:   CXR:     54y Female PMHx HTN, T2DM, asthma, depression with recent diagnosis of invasive ampullary adenocarcinoma of the CBD whom underwent an uneventful whipple 1/11/18. Patient has had an uptrending WBC since procedure, initially attributed to not restarting antibiotics for cholangitis, but presents today with abdominal tenderness/distention and tachycardia. Admitted to SICU for close hemodynamic monitoring.    Neuro:  - fentanyl and prop  -daily sedation vacation    Resp:  -currently on mecvhanical vent with fio2 of 50%  -CPAP trial as tolerated if patient remains stable   -wean fio2    CV:  - Monitor SBP, currently off vassopressors    GI:  -NPO/ngt    Heme:   -monitor H/H  -serial CBCs    Renal:  - Strict I&O, monitor lytes, replete as needed  -LR@100    ID:   - On meropenem for broadened coverage    Endo:  - ISS for diabetes    Access: RIJ cordis, 2 PIV, 1 GURINDER draining serosanguinous, ETT, Abthera VAC    Dispo: SICU for hemodynamic monitoring       --------------------------------------------------------------------------------------    Critical Care Diagnoses: SICU Daily Progress Note  =====================================================  Interval/Overnight Events:     Patient was transported to SICU. Patient was febrile to 102. Cultures sent. Antibiotics changed from zosyn to meropenem. Patient was tachypneic and hypotensive to 70s systolic. CBC revealed drop in H/H to 6/19 with elevated lactate of 9. Decision was made to intubate patient. Gurinder with vasu blood. Massive Transfusion protocol initiated. Right IJ cordis placed as well as arterial line. Patient received 4PRBC 5FFP and 1platelet, and bolus 1L of fluid. Surgical team and attending notified when Peak pressures on vent were 50s and abominal distension increased. Decision was then made to take patient emergently to OR. Patient had evacuation of hematoma, cessation of gastroduodenal artery bleeding with two hemostatic stiches, and placement of abthera VAC.     Patient hypotensive post-op with low UOP. 2 L bolus given and valentina trac started. CBC remained stable. SVV began to increase to 18, 250 of albumin given. Patient hypotensive to 80s systolic, [place] started and increased, patient was then transitioned to levo. Lactate downtrended to 7.6 and CBC  crit stable still. SVV at this time was 9-12    POD #   1       	SICU Day #    2     HISTORY OF PRESENT ILLNESS:  NOEMI SOUSA is a 54y Female PMHx HTN, T2DM, asthma, depression with recent diagnosis of invasive ampullary adenocarcinoma of the CBD whom underwent an uneventful whipple 1/11/18. Patient has had an uptrending WBC since procedure, initially attributed to not restarting antibiotics for cholangitis, but presents today with abdominal tenderness/distention and tachycardia. Admitted to SICU for close hemodynamic monitoring.    PAST MEDICAL HISTORY:   Adenocarcinoma of gallbladder  Type 2 diabetes mellitus without complication, without long-term current use of insulin  Neuropathy  Arthritis  Mild intermittent asthma without complication  Gastroesophageal reflux disease without esophagitis  Essential hypertension      PAST SURGICAL HISTORY: No significant past surgical history      FAMILY HISTORY: No pertinent family history in first degree relatives    ALLERGIES: No Known Allergies      MEDICATIONS:   --------------------------------------------------------------------------------------  Neurologic Medications  fentaNYL   Infusion. 0.5 MICROgram(s)/kG/Hr IV Continuous <Continuous>  propofol Infusion 5 MICROgram(s)/kG/Min IV Continuous <Continuous>    Respiratory Medications    Cardiovascular Medications  phenylephrine    Infusion 0.2 MICROgram(s)/kG/Min IV Continuous <Continuous>    Gastrointestinal Medications  lactated ringers Bolus 500 milliLiter(s) IV Bolus once  lactated ringers. 1000 milliLiter(s) IV Continuous <Continuous>  magnesium sulfate  IVPB 2 Gram(s) IV Intermittent once  pantoprazole  Injectable 40 milliGRAM(s) IV Push daily    Genitourinary Medications    Hematologic/Oncologic Medications  heparin  Injectable 5000 Unit(s) SubCutaneous every 8 hours    Antimicrobial/Immunologic Medications  meropenem  IVPB 1000 milliGRAM(s) IV Intermittent every 8 hours  meropenem  IVPB        Endocrine/Metabolic Medications  insulin lispro (HumaLOG) corrective regimen sliding scale   SubCutaneous every 6 hours    Topical/Other Medications    --------------------------------------------------------------------------------------    VITAL SIGNS, INS/OUTS (last 24 hours):  --------------------------------------------------------------------------------------  ((Insert SICU Vitals/Is+Os here))***  --------------------------------------------------------------------------------------    EXAM  NEUROLOGY  RASS: 0  Exam: NAD, sedated on propofol and fentanyl     HEENT  Exam: Normocephalic, atraumatic, EOMI.      RESPIRATORY  Exam: Lungs clear to auscultation, Normal expansion/effort.   Mechanical Ventilation: Mode: AC/ CMV (Assist Control/ Continuous Mandatory Ventilation), RR (machine): 24, TV (machine): 400, FiO2: 50, PEEP: 5, MAP: 11.7, PIP: 23    CARDIOVASCULAR  Exam: S1, S2.  Regular rate and rhythm.       GI/NUTRITION  Exam: Abdomen soft, Non-tender, Non-distended.    Wound: abthera VAC in place, Reece drain in place with sanguinous drainage.     VASCULAR  Exam: Extremities warm, pink, well-perfused.     MUSCULOSKELETAL  Exam: All extremities moving spontaneously without limitations.     SKIN  Exam: Good skin turgor, no skin breakdown.     METABOLIC/FLUIDS/ELECTROLYTES  lactated ringers Bolus 500 milliLiter(s) IV Bolus once  lactated ringers. 1000 milliLiter(s) IV Continuous <Continuous>  magnesium sulfate  IVPB 2 Gram(s) IV Intermittent once      HEMATOLOGIC  [x] VTE Prophylaxis: heparin  Injectable 5000 Unit(s) SubCutaneous every 8 hours    Transfusions:	[] PRBC	[] Platelets		[] FFP	[] Cryoprecipitate    INFECTIOUS DISEASE  Antimicrobials/Immunologic Medications:  meropenem  IVPB 1000 milliGRAM(s) IV Intermittent every 8 hours  meropenem  IVPB        Day #      of     ***    Tubes/Lines/Drains  ***  [x] Peripheral IV  [x] Central Venous Line     	[x] R	[] L	[x] IJ	[] Fem	[] SC	Date Placed: 1/18/19  [x] Arterial Line		[] R	[x] L	[] Fem	[] Rad	[] Ax	Date Placed: 1/18/19  [] PICC		[] Midline		[] Mediport  [x] Urinary Catheter		Date Placed:   [x] Necessity of urinary, arterial, and venous catheters discussed    LABS  --------------------------------------------------------------------------------------  ((Insert SICU Labs here))***  --------------------------------------------------------------------------------------    OTHER LABORATORY:     IMAGING STUDIES:   CXR:     54y Female PMHx HTN, T2DM, asthma, depression with recent diagnosis of invasive ampullary adenocarcinoma of the CBD whom underwent an uneventful whipple 1/11/18. Patient has had an uptrending WBC since procedure, initially attributed to not restarting antibiotics for cholangitis, but presents today with abdominal tenderness/distention and tachycardia. Admitted to SICU for close hemodynamic monitoring.    Neuro:  - fentanyl and prop  -daily sedation vacation    Resp:  -currently on mecvhanical vent with fio2 of 50%  -CPAP trial as tolerated if patient remains stable   -wean fio2    CV:  - Monitor SBP, currently off vassopressors    GI:  -NPO/ngt    Heme:   -monitor H/H  -serial CBCs    Renal:  - Strict I&O, monitor lytes, replete as needed  -LR@100    ID:   - On meropenem for broadened coverage    Endo:  - ISS for diabetes    Access: RIJ cordis, 2 PIV, 1 GURINDER draining serosanguinous, ETT, Abthera VAC    Dispo: SICU for hemodynamic monitoring       --------------------------------------------------------------------------------------    Critical Care Diagnoses: SICU Daily Progress Note  =====================================================  Interval/Overnight Events:     Patient was transported to SICU. Patient was febrile to 102. Cultures sent. Antibiotics changed from zosyn to meropenem. Patient was tachypneic and hypotensive to 70s systolic. CBC revealed drop in H/H to 6/19 with elevated lactate of 9. Decision was made to intubate patient. Gurinder with vasu blood. Massive Transfusion protocol initiated. Right IJ cordis placed as well as arterial line. Patient received 4PRBC 5FFP and 1platelet, and bolus 1L of fluid. Surgical team and attending notified when Peak pressures on vent were 50s and abominal distension increased. Decision was then made to take patient emergently to OR. Patient had evacuation of hematoma, cessation of gastroduodenal artery bleeding with two hemostatic stiches, and placement of abthera VAC.     Patient hypotensive post-op with low UOP. 2 L bolus given and valentina trac started. CBC remained stable. SVV began to increase to 18, 250 of albumin given. Patient hypotensive to 80s systolic, [place] started and increased, patient was then transitioned to levo. Lactate downtrended to 7.6 and CBC  crit stable still. SVV at this time was 9-12    POD #   1       	SICU Day #    2     HISTORY OF PRESENT ILLNESS:  NOEMI SOUSA is a 54y Female PMHx HTN, T2DM, asthma, depression with recent diagnosis of invasive ampullary adenocarcinoma of the CBD whom underwent an uneventful whipple 1/11/18. Patient has had an uptrending WBC since procedure, initially attributed to not restarting antibiotics for cholangitis, but presents today with abdominal tenderness/distention and tachycardia. Admitted to SICU for close hemodynamic monitoring.    PAST MEDICAL HISTORY:   Adenocarcinoma of gallbladder  Type 2 diabetes mellitus without complication, without long-term current use of insulin  Neuropathy  Arthritis  Mild intermittent asthma without complication  Gastroesophageal reflux disease without esophagitis  Essential hypertension      PAST SURGICAL HISTORY: No significant past surgical history      FAMILY HISTORY: No pertinent family history in first degree relatives    ALLERGIES: No Known Allergies      MEDICATIONS:   --------------------------------------------------------------------------------------  Neurologic Medications  fentaNYL   Infusion. 0.5 MICROgram(s)/kG/Hr IV Continuous <Continuous>  propofol Infusion 5 MICROgram(s)/kG/Min IV Continuous <Continuous>    Respiratory Medications    Cardiovascular Medications  phenylephrine    Infusion 0.2 MICROgram(s)/kG/Min IV Continuous <Continuous>    Gastrointestinal Medications  lactated ringers Bolus 500 milliLiter(s) IV Bolus once  lactated ringers. 1000 milliLiter(s) IV Continuous <Continuous>  magnesium sulfate  IVPB 2 Gram(s) IV Intermittent once  pantoprazole  Injectable 40 milliGRAM(s) IV Push daily    Genitourinary Medications    Hematologic/Oncologic Medications  heparin  Injectable 5000 Unit(s) SubCutaneous every 8 hours    Antimicrobial/Immunologic Medications  meropenem  IVPB 1000 milliGRAM(s) IV Intermittent every 8 hours  meropenem  IVPB        Endocrine/Metabolic Medications  insulin lispro (HumaLOG) corrective regimen sliding scale   SubCutaneous every 6 hours    Topical/Other Medications    --------------------------------------------------------------------------------------    VITAL SIGNS, INS/OUTS (last 24 hours):  --------------------------------------------------------------------------------------  ((Insert SICU Vitals/Is+Os here))***  --------------------------------------------------------------------------------------    EXAM  NEUROLOGY  RASS: 0  Exam: NAD, sedated on propofol and fentanyl     HEENT  Exam: Normocephalic, atraumatic, EOMI.      RESPIRATORY  Exam: Lungs clear to auscultation, Normal expansion/effort.   Mechanical Ventilation: Mode: AC/ CMV (Assist Control/ Continuous Mandatory Ventilation), RR (machine): 24, TV (machine): 400, FiO2: 50, PEEP: 5, MAP: 11.7, PIP: 23    CARDIOVASCULAR  Exam: S1, S2.  Regular rate and rhythm.       GI/NUTRITION  Exam: Abdomen soft, Non-tender, Non-distended.    Wound: abthera VAC in place, Reece drain in place with sanguinous drainage.     VASCULAR  Exam: Extremities warm, pink, well-perfused.     MUSCULOSKELETAL  Exam: All extremities moving spontaneously without limitations.     SKIN  Exam: Good skin turgor, no skin breakdown.     METABOLIC/FLUIDS/ELECTROLYTES  lactated ringers Bolus 500 milliLiter(s) IV Bolus once  lactated ringers. 1000 milliLiter(s) IV Continuous <Continuous>  magnesium sulfate  IVPB 2 Gram(s) IV Intermittent once      HEMATOLOGIC  [x] VTE Prophylaxis: heparin  Injectable 5000 Unit(s) SubCutaneous every 8 hours    Transfusions:	[] PRBC	[] Platelets		[] FFP	[] Cryoprecipitate    INFECTIOUS DISEASE  Antimicrobials/Immunologic Medications:  meropenem  IVPB 1000 milliGRAM(s) IV Intermittent every 8 hours  meropenem  IVPB        Day #      of     ***    Tubes/Lines/Drains  ***  [x] Peripheral IV  [x] Central Venous Line     	[x] R	[] L	[x] IJ	[] Fem	[] SC	Date Placed: 1/18/19  [x] Arterial Line		[] R	[x] L	[] Fem	[] Rad	[] Ax	Date Placed: 1/18/19  [] PICC		[] Midline		[] Mediport  [x] Urinary Catheter		Date Placed:   [x] Necessity of urinary, arterial, and venous catheters discussed    LABS  --------------------------------------------------------------------------------------  ((Insert SICU Labs here))***  --------------------------------------------------------------------------------------    OTHER LABORATORY:     IMAGING STUDIES:   CXR:

## 2019-01-20 ENCOUNTER — TRANSCRIPTION ENCOUNTER (OUTPATIENT)
Age: 54
End: 2019-01-20

## 2019-01-20 LAB
ALBUMIN SERPL ELPH-MCNC: 2.3 G/DL — LOW (ref 3.3–5)
ALBUMIN SERPL ELPH-MCNC: 2.4 G/DL — LOW (ref 3.3–5)
ALBUMIN SERPL ELPH-MCNC: 2.6 G/DL — LOW (ref 3.3–5)
ALP SERPL-CCNC: 123 U/L — HIGH (ref 40–120)
ALP SERPL-CCNC: 130 U/L — HIGH (ref 40–120)
ALP SERPL-CCNC: 133 U/L — HIGH (ref 40–120)
ALT FLD-CCNC: 13 U/L — SIGNIFICANT CHANGE UP (ref 4–33)
ALT FLD-CCNC: 14 U/L — SIGNIFICANT CHANGE UP (ref 4–33)
ALT FLD-CCNC: 15 U/L — SIGNIFICANT CHANGE UP (ref 4–33)
ANION GAP SERPL CALC-SCNC: 11 MMO/L — SIGNIFICANT CHANGE UP (ref 7–14)
ANION GAP SERPL CALC-SCNC: 15 MMO/L — HIGH (ref 7–14)
ANION GAP SERPL CALC-SCNC: 17 MMO/L — HIGH (ref 7–14)
APTT BLD: 36.1 SEC — SIGNIFICANT CHANGE UP (ref 27.5–36.3)
APTT BLD: 39.1 SEC — HIGH (ref 27.5–36.3)
APTT BLD: 39.9 SEC — HIGH (ref 27.5–36.3)
AST SERPL-CCNC: 30 U/L — SIGNIFICANT CHANGE UP (ref 4–32)
AST SERPL-CCNC: 31 U/L — SIGNIFICANT CHANGE UP (ref 4–32)
AST SERPL-CCNC: 33 U/L — HIGH (ref 4–32)
BASE EXCESS BLDA CALC-SCNC: -0.2 MMOL/L — SIGNIFICANT CHANGE UP
BASE EXCESS BLDA CALC-SCNC: -0.8 MMOL/L — SIGNIFICANT CHANGE UP
BASE EXCESS BLDA CALC-SCNC: -0.9 MMOL/L — SIGNIFICANT CHANGE UP
BASE EXCESS BLDA CALC-SCNC: 0.2 MMOL/L — SIGNIFICANT CHANGE UP
BILIRUB SERPL-MCNC: 3.7 MG/DL — HIGH (ref 0.2–1.2)
BILIRUB SERPL-MCNC: 4.9 MG/DL — HIGH (ref 0.2–1.2)
BILIRUB SERPL-MCNC: 5.4 MG/DL — HIGH (ref 0.2–1.2)
BUN SERPL-MCNC: 10 MG/DL — SIGNIFICANT CHANGE UP (ref 7–23)
BUN SERPL-MCNC: 6 MG/DL — LOW (ref 7–23)
BUN SERPL-MCNC: 8 MG/DL — SIGNIFICANT CHANGE UP (ref 7–23)
CA-I BLD-SCNC: 0.99 MMOL/L — LOW (ref 1.03–1.23)
CA-I BLD-SCNC: 1.11 MMOL/L — SIGNIFICANT CHANGE UP (ref 1.03–1.23)
CA-I BLD-SCNC: 1.15 MMOL/L — SIGNIFICANT CHANGE UP (ref 1.03–1.23)
CA-I BLDA-SCNC: 1.18 MMOL/L — SIGNIFICANT CHANGE UP (ref 1.15–1.29)
CA-I BLDA-SCNC: 1.2 MMOL/L — SIGNIFICANT CHANGE UP (ref 1.15–1.29)
CALCIUM SERPL-MCNC: 7.9 MG/DL — LOW (ref 8.4–10.5)
CALCIUM SERPL-MCNC: 8.2 MG/DL — LOW (ref 8.4–10.5)
CALCIUM SERPL-MCNC: 8.4 MG/DL — SIGNIFICANT CHANGE UP (ref 8.4–10.5)
CHLORIDE BLDA-SCNC: 102 MMOL/L — SIGNIFICANT CHANGE UP (ref 96–108)
CHLORIDE BLDA-SCNC: 104 MMOL/L — SIGNIFICANT CHANGE UP (ref 96–108)
CHLORIDE SERPL-SCNC: 99 MMOL/L — SIGNIFICANT CHANGE UP (ref 98–107)
CO2 SERPL-SCNC: 19 MMOL/L — LOW (ref 22–31)
CO2 SERPL-SCNC: 20 MMOL/L — LOW (ref 22–31)
CO2 SERPL-SCNC: 23 MMOL/L — SIGNIFICANT CHANGE UP (ref 22–31)
CREAT SERPL-MCNC: 0.63 MG/DL — SIGNIFICANT CHANGE UP (ref 0.5–1.3)
CREAT SERPL-MCNC: 0.64 MG/DL — SIGNIFICANT CHANGE UP (ref 0.5–1.3)
CREAT SERPL-MCNC: 0.67 MG/DL — SIGNIFICANT CHANGE UP (ref 0.5–1.3)
GLUCOSE BLDA-MCNC: 106 MG/DL — HIGH (ref 70–99)
GLUCOSE BLDA-MCNC: 107 MG/DL — HIGH (ref 70–99)
GLUCOSE BLDA-MCNC: 111 MG/DL — HIGH (ref 70–99)
GLUCOSE BLDA-MCNC: 113 MG/DL — HIGH (ref 70–99)
GLUCOSE SERPL-MCNC: 104 MG/DL — HIGH (ref 70–99)
GLUCOSE SERPL-MCNC: 106 MG/DL — HIGH (ref 70–99)
GLUCOSE SERPL-MCNC: 107 MG/DL — HIGH (ref 70–99)
HCO3 BLDA-SCNC: 24 MMOL/L — SIGNIFICANT CHANGE UP (ref 22–26)
HCO3 BLDA-SCNC: 24 MMOL/L — SIGNIFICANT CHANGE UP (ref 22–26)
HCO3 BLDA-SCNC: 25 MMOL/L — SIGNIFICANT CHANGE UP (ref 22–26)
HCO3 BLDA-SCNC: 25 MMOL/L — SIGNIFICANT CHANGE UP (ref 22–26)
HCT VFR BLD CALC: 24 % — LOW (ref 34.5–45)
HCT VFR BLD CALC: 28.8 % — LOW (ref 34.5–45)
HCT VFR BLD CALC: 28.9 % — LOW (ref 34.5–45)
HCT VFR BLD CALC: 29.1 % — LOW (ref 34.5–45)
HCT VFR BLDA CALC: 26.3 % — LOW (ref 34.5–46.5)
HCT VFR BLDA CALC: 30.6 % — LOW (ref 34.5–46.5)
HCT VFR BLDA CALC: 32.3 % — LOW (ref 34.5–46.5)
HCT VFR BLDA CALC: 41.2 % — SIGNIFICANT CHANGE UP (ref 34.5–46.5)
HGB BLD-MCNC: 10.1 G/DL — LOW (ref 11.5–15.5)
HGB BLD-MCNC: 10.1 G/DL — LOW (ref 11.5–15.5)
HGB BLD-MCNC: 8.6 G/DL — LOW (ref 11.5–15.5)
HGB BLD-MCNC: 9.9 G/DL — LOW (ref 11.5–15.5)
HGB BLDA-MCNC: 10.5 G/DL — LOW (ref 11.5–15.5)
HGB BLDA-MCNC: 13.4 G/DL — SIGNIFICANT CHANGE UP (ref 11.5–15.5)
HGB BLDA-MCNC: 8.4 G/DL — LOW (ref 11.5–15.5)
HGB BLDA-MCNC: 9.9 G/DL — LOW (ref 11.5–15.5)
INR BLD: 1.62 — HIGH (ref 0.88–1.17)
INR BLD: 1.76 — HIGH (ref 0.88–1.17)
INR BLD: 1.82 — HIGH (ref 0.88–1.17)
LACTATE BLDA-SCNC: 2.5 MMOL/L — HIGH (ref 0.5–2)
LACTATE BLDA-SCNC: 3.5 MMOL/L — HIGH (ref 0.5–2)
LACTATE BLDA-SCNC: 4 MMOL/L — CRITICAL HIGH (ref 0.5–2)
LACTATE BLDA-SCNC: 4.1 MMOL/L — CRITICAL HIGH (ref 0.5–2)
MAGNESIUM SERPL-MCNC: 2 MG/DL — SIGNIFICANT CHANGE UP (ref 1.6–2.6)
MCHC RBC-ENTMCNC: 27.1 PG — SIGNIFICANT CHANGE UP (ref 27–34)
MCHC RBC-ENTMCNC: 27.2 PG — SIGNIFICANT CHANGE UP (ref 27–34)
MCHC RBC-ENTMCNC: 27.6 PG — SIGNIFICANT CHANGE UP (ref 27–34)
MCHC RBC-ENTMCNC: 28.1 PG — SIGNIFICANT CHANGE UP (ref 27–34)
MCHC RBC-ENTMCNC: 34.4 % — SIGNIFICANT CHANGE UP (ref 32–36)
MCHC RBC-ENTMCNC: 34.7 % — SIGNIFICANT CHANGE UP (ref 32–36)
MCHC RBC-ENTMCNC: 34.9 % — SIGNIFICANT CHANGE UP (ref 32–36)
MCHC RBC-ENTMCNC: 35.8 % — SIGNIFICANT CHANGE UP (ref 32–36)
MCV RBC AUTO: 78 FL — LOW (ref 80–100)
MCV RBC AUTO: 78.4 FL — LOW (ref 80–100)
MCV RBC AUTO: 79 FL — LOW (ref 80–100)
MCV RBC AUTO: 79.1 FL — LOW (ref 80–100)
NRBC # FLD: 0.06 K/UL — LOW (ref 25–125)
NRBC # FLD: 0.07 K/UL — LOW (ref 25–125)
NRBC # FLD: 0.09 K/UL — LOW (ref 25–125)
NRBC # FLD: 0.11 K/UL — LOW (ref 25–125)
ORGANISM # SPEC MICROSCOPIC CNT: SIGNIFICANT CHANGE UP
PCO2 BLDA: 28 MMHG — LOW (ref 32–48)
PCO2 BLDA: 29 MMHG — LOW (ref 32–48)
PCO2 BLDA: 30 MMHG — LOW (ref 32–48)
PCO2 BLDA: 37 MMHG — SIGNIFICANT CHANGE UP (ref 32–48)
PH BLDA: 7.43 PH — SIGNIFICANT CHANGE UP (ref 7.35–7.45)
PH BLDA: 7.49 PH — HIGH (ref 7.35–7.45)
PH BLDA: 7.49 PH — HIGH (ref 7.35–7.45)
PH BLDA: 7.5 PH — HIGH (ref 7.35–7.45)
PHOSPHATE SERPL-MCNC: 2.2 MG/DL — LOW (ref 2.5–4.5)
PHOSPHATE SERPL-MCNC: 2.3 MG/DL — LOW (ref 2.5–4.5)
PHOSPHATE SERPL-MCNC: 2.5 MG/DL — SIGNIFICANT CHANGE UP (ref 2.5–4.5)
PLATELET # BLD AUTO: 147 K/UL — LOW (ref 150–400)
PLATELET # BLD AUTO: 148 K/UL — LOW (ref 150–400)
PLATELET # BLD AUTO: 151 K/UL — SIGNIFICANT CHANGE UP (ref 150–400)
PLATELET # BLD AUTO: 156 K/UL — SIGNIFICANT CHANGE UP (ref 150–400)
PMV BLD: 10.4 FL — SIGNIFICANT CHANGE UP (ref 7–13)
PMV BLD: 10.6 FL — SIGNIFICANT CHANGE UP (ref 7–13)
PMV BLD: 10.8 FL — SIGNIFICANT CHANGE UP (ref 7–13)
PMV BLD: 10.9 FL — SIGNIFICANT CHANGE UP (ref 7–13)
PO2 BLDA: 66 MMHG — LOW (ref 83–108)
PO2 BLDA: 76 MMHG — LOW (ref 83–108)
PO2 BLDA: 86 MMHG — SIGNIFICANT CHANGE UP (ref 83–108)
PO2 BLDA: 87 MMHG — SIGNIFICANT CHANGE UP (ref 83–108)
POTASSIUM BLDA-SCNC: 4 MMOL/L — SIGNIFICANT CHANGE UP (ref 3.4–4.5)
POTASSIUM BLDA-SCNC: 4.1 MMOL/L — SIGNIFICANT CHANGE UP (ref 3.4–4.5)
POTASSIUM BLDA-SCNC: 4.1 MMOL/L — SIGNIFICANT CHANGE UP (ref 3.4–4.5)
POTASSIUM BLDA-SCNC: 4.2 MMOL/L — SIGNIFICANT CHANGE UP (ref 3.4–4.5)
POTASSIUM SERPL-MCNC: 4.3 MMOL/L — SIGNIFICANT CHANGE UP (ref 3.5–5.3)
POTASSIUM SERPL-MCNC: 4.4 MMOL/L — SIGNIFICANT CHANGE UP (ref 3.5–5.3)
POTASSIUM SERPL-MCNC: 4.6 MMOL/L — SIGNIFICANT CHANGE UP (ref 3.5–5.3)
POTASSIUM SERPL-SCNC: 4.3 MMOL/L — SIGNIFICANT CHANGE UP (ref 3.5–5.3)
POTASSIUM SERPL-SCNC: 4.4 MMOL/L — SIGNIFICANT CHANGE UP (ref 3.5–5.3)
POTASSIUM SERPL-SCNC: 4.6 MMOL/L — SIGNIFICANT CHANGE UP (ref 3.5–5.3)
PROT SERPL-MCNC: 4.7 G/DL — LOW (ref 6–8.3)
PROT SERPL-MCNC: 4.7 G/DL — LOW (ref 6–8.3)
PROT SERPL-MCNC: 4.8 G/DL — LOW (ref 6–8.3)
PROTHROM AB SERPL-ACNC: 18.8 SEC — HIGH (ref 9.8–13.1)
PROTHROM AB SERPL-ACNC: 19.9 SEC — HIGH (ref 9.8–13.1)
PROTHROM AB SERPL-ACNC: 21.1 SEC — HIGH (ref 9.8–13.1)
RBC # BLD: 3.06 M/UL — LOW (ref 3.8–5.2)
RBC # BLD: 3.64 M/UL — LOW (ref 3.8–5.2)
RBC # BLD: 3.66 M/UL — LOW (ref 3.8–5.2)
RBC # BLD: 3.73 M/UL — LOW (ref 3.8–5.2)
RBC # FLD: 16.5 % — HIGH (ref 10.3–14.5)
RBC # FLD: 16.7 % — HIGH (ref 10.3–14.5)
RBC # FLD: 17.1 % — HIGH (ref 10.3–14.5)
RBC # FLD: 18.2 % — HIGH (ref 10.3–14.5)
SAO2 % BLDA: 93.2 % — LOW (ref 95–99)
SAO2 % BLDA: 96.2 % — SIGNIFICANT CHANGE UP (ref 95–99)
SAO2 % BLDA: 97.7 % — SIGNIFICANT CHANGE UP (ref 95–99)
SAO2 % BLDA: 97.9 % — SIGNIFICANT CHANGE UP (ref 95–99)
SODIUM BLDA-SCNC: 127 MMOL/L — LOW (ref 136–146)
SODIUM BLDA-SCNC: 127 MMOL/L — LOW (ref 136–146)
SODIUM BLDA-SCNC: 129 MMOL/L — LOW (ref 136–146)
SODIUM BLDA-SCNC: 129 MMOL/L — LOW (ref 136–146)
SODIUM SERPL-SCNC: 133 MMOL/L — LOW (ref 135–145)
SODIUM SERPL-SCNC: 134 MMOL/L — LOW (ref 135–145)
SODIUM SERPL-SCNC: 135 MMOL/L — SIGNIFICANT CHANGE UP (ref 135–145)
SPECIMEN SOURCE: SIGNIFICANT CHANGE UP
SPECIMEN SOURCE: SIGNIFICANT CHANGE UP
VANCOMYCIN TROUGH SERPL-MCNC: 12.8 UG/ML — SIGNIFICANT CHANGE UP (ref 10–20)
WBC # BLD: 17.85 K/UL — HIGH (ref 3.8–10.5)
WBC # BLD: 17.89 K/UL — HIGH (ref 3.8–10.5)
WBC # BLD: 18.58 K/UL — HIGH (ref 3.8–10.5)
WBC # BLD: 22.45 K/UL — HIGH (ref 3.8–10.5)
WBC # FLD AUTO: 17.85 K/UL — HIGH (ref 3.8–10.5)
WBC # FLD AUTO: 17.89 K/UL — HIGH (ref 3.8–10.5)
WBC # FLD AUTO: 18.58 K/UL — HIGH (ref 3.8–10.5)
WBC # FLD AUTO: 22.45 K/UL — HIGH (ref 3.8–10.5)

## 2019-01-20 PROCEDURE — 71045 X-RAY EXAM CHEST 1 VIEW: CPT | Mod: 26

## 2019-01-20 PROCEDURE — 99291 CRITICAL CARE FIRST HOUR: CPT

## 2019-01-20 RX ORDER — ENOXAPARIN SODIUM 100 MG/ML
40 INJECTION SUBCUTANEOUS DAILY
Qty: 0 | Refills: 0 | Status: DISCONTINUED | OUTPATIENT
Start: 2019-01-20 | End: 2019-03-05

## 2019-01-20 RX ORDER — CALCIUM GLUCONATE 100 MG/ML
2 VIAL (ML) INTRAVENOUS ONCE
Qty: 0 | Refills: 0 | Status: COMPLETED | OUTPATIENT
Start: 2019-01-20 | End: 2019-01-20

## 2019-01-20 RX ADMIN — MEROPENEM 100 MILLIGRAM(S): 1 INJECTION INTRAVENOUS at 23:00

## 2019-01-20 RX ADMIN — Medication 250 MILLIGRAM(S): at 18:33

## 2019-01-20 RX ADMIN — ENOXAPARIN SODIUM 40 MILLIGRAM(S): 100 INJECTION SUBCUTANEOUS at 12:11

## 2019-01-20 RX ADMIN — Medication 250 MILLIGRAM(S): at 05:15

## 2019-01-20 RX ADMIN — MEROPENEM 100 MILLIGRAM(S): 1 INJECTION INTRAVENOUS at 14:45

## 2019-01-20 RX ADMIN — MIDAZOLAM HYDROCHLORIDE 1.15 MG/KG/HR: 1 INJECTION, SOLUTION INTRAMUSCULAR; INTRAVENOUS at 08:36

## 2019-01-20 RX ADMIN — Medication 200 GRAM(S): at 05:15

## 2019-01-20 RX ADMIN — CHLORHEXIDINE GLUCONATE 1 APPLICATION(S): 213 SOLUTION TOPICAL at 12:12

## 2019-01-20 RX ADMIN — FENTANYL CITRATE 2.87 MICROGRAM(S)/KG/HR: 50 INJECTION INTRAVENOUS at 08:37

## 2019-01-20 RX ADMIN — Medication 2.69 MICROGRAM(S)/KG/MIN: at 08:36

## 2019-01-20 RX ADMIN — SODIUM CHLORIDE 100 MILLILITER(S): 9 INJECTION, SOLUTION INTRAVENOUS at 08:36

## 2019-01-20 RX ADMIN — MEROPENEM 100 MILLIGRAM(S): 1 INJECTION INTRAVENOUS at 05:15

## 2019-01-20 RX ADMIN — PANTOPRAZOLE SODIUM 40 MILLIGRAM(S): 20 TABLET, DELAYED RELEASE ORAL at 12:11

## 2019-01-20 RX ADMIN — Medication 63.75 MILLIMOLE(S): at 18:06

## 2019-01-20 RX ADMIN — VASOPRESSIN 2.4 UNIT(S)/MIN: 20 INJECTION INTRAVENOUS at 08:35

## 2019-01-20 RX ADMIN — FENTANYL CITRATE 2.87 MICROGRAM(S)/KG/HR: 50 INJECTION INTRAVENOUS at 05:19

## 2019-01-20 NOTE — PROGRESS NOTE ADULT - SUBJECTIVE AND OBJECTIVE BOX
Interval/Overnight Events:    Pt continues to be on pressors, with waxing and waning pressor requirements. Pressor requirements decrease with boluses of albumin 500cc x2. Still with poor UOP. H/H trending down, given 1U pRBC.    POD #   1       	SICU Day #    2     HISTORY OF PRESENT ILLNESS:  NOEMI SOUSA is a 54y Female PMHx HTN, T2DM, asthma, depression with recent diagnosis of invasive ampullary adenocarcinoma of the CBD whom underwent an uneventful whipple 1/11/18. Patient has had an uptrending WBC since procedure, initially attributed to not restarting antibiotics for cholangitis, but presents today with abdominal tenderness/distention and tachycardia. Admitted to SICU for close hemodynamic monitoring.    PAST MEDICAL HISTORY:   Adenocarcinoma of gallbladder  Type 2 diabetes mellitus without complication, without long-term current use of insulin  Neuropathy  Arthritis  Mild intermittent asthma without complication  Gastroesophageal reflux disease without esophagitis  Essential hypertension      PAST SURGICAL HISTORY: No significant past surgical history      FAMILY HISTORY: No pertinent family history in first degree relatives    ALLERGIES: No Known Allergies      MEDICATIONS:   --------------------------------------------------------------------------------------  Neurologic Medications  fentaNYL   Infusion. 0.5 MICROgram(s)/kG/Hr IV Continuous <Continuous>  propofol Infusion 5 MICROgram(s)/kG/Min IV Continuous <Continuous>    Respiratory Medications    Cardiovascular Medications  phenylephrine    Infusion 0.2 MICROgram(s)/kG/Min IV Continuous <Continuous>    Gastrointestinal Medications  lactated ringers Bolus 500 milliLiter(s) IV Bolus once  lactated ringers. 1000 milliLiter(s) IV Continuous <Continuous>  magnesium sulfate  IVPB 2 Gram(s) IV Intermittent once  pantoprazole  Injectable 40 milliGRAM(s) IV Push daily    Genitourinary Medications    Hematologic/Oncologic Medications  heparin  Injectable 5000 Unit(s) SubCutaneous every 8 hours    Antimicrobial/Immunologic Medications  meropenem  IVPB 1000 milliGRAM(s) IV Intermittent every 8 hours  meropenem  IVPB        Endocrine/Metabolic Medications  insulin lispro (HumaLOG) corrective regimen sliding scale   SubCutaneous every 6 hours    Topical/Other Medications    --------------------------------------------------------------------------------------    VITAL SIGNS, INS/OUTS (last 24 hours):  --------------------------------------------------------------------------------------  ((Insert SICU Vitals/Is+Os here))***  --------------------------------------------------------------------------------------    EXAM  NEUROLOGY  RASS: 0  Exam: NAD, sedated on propofol and fentanyl     HEENT  Exam: Normocephalic, atraumatic, EOMI.      RESPIRATORY  Exam: Lungs clear to auscultation, Normal expansion/effort.   Mechanical Ventilation: Mode: AC/ CMV (Assist Control/ Continuous Mandatory Ventilation), RR (machine): 24, TV (machine): 400, FiO2: 50, PEEP: 5, MAP: 11.7, PIP: 23    CARDIOVASCULAR  Exam: S1, S2.  Regular rate and rhythm.       GI/NUTRITION  Exam: Abdomen soft, Non-tender, Non-distended.    Wound: abthera VAC in place, Reece drain in place with sanguinous drainage.     VASCULAR  Exam: Extremities warm, pink, well-perfused.     MUSCULOSKELETAL  Exam: All extremities moving spontaneously without limitations.     SKIN  Exam: Good skin turgor, no skin breakdown.     METABOLIC/FLUIDS/ELECTROLYTES  lactated ringers Bolus 500 milliLiter(s) IV Bolus once  lactated ringers. 1000 milliLiter(s) IV Continuous <Continuous>  magnesium sulfate  IVPB 2 Gram(s) IV Intermittent once      HEMATOLOGIC  [x] VTE Prophylaxis: heparin  Injectable 5000 Unit(s) SubCutaneous every 8 hours    Transfusions:	[] PRBC	[] Platelets		[] FFP	[] Cryoprecipitate    INFECTIOUS DISEASE  Antimicrobials/Immunologic Medications:  meropenem  IVPB 1000 milliGRAM(s) IV Intermittent every 8 hours  meropenem  IVPB        Day #      of     ***    Tubes/Lines/Drains  ***  [x] Peripheral IV  [x] Central Venous Line     	[x] R	[] L	[x] IJ	[] Fem	[] SC	Date Placed: 1/18/19  [x] Arterial Line		[] R	[x] L	[] Fem	[] Rad	[] Ax	Date Placed: 1/18/19  [] PICC		[] Midline		[] Mediport  [x] Urinary Catheter		Date Placed:   [x] Necessity of urinary, arterial, and venous catheters discussed    LABS  --------------------------------------------------------------------------------------  ((Insert SICU Labs here))***  --------------------------------------------------------------------------------------    OTHER LABORATORY:     IMAGING STUDIES:   CXR:         Assessment and Plan:   · Assessment		  54y Female PMHx HTN, T2DM, asthma, depression with recent diagnosis of invasive ampullary adenocarcinoma of the CBD whom underwent an uneventful whipple 1/11/18. Patient has had an uptrending WBC since procedure, initially attributed to not restarting antibiotics for cholangitis, but presents today with abdominal tenderness/distention and tachycardia. Admitted to SICU for close hemodynamic monitoring.    Neuro:  - on fentanyl and prop, wean off   -give versed prn for agitation/discomfort, can start precedex infusion  -daily sedation vacation in AM    Resp:  -wean off vent, FiO2 to 30%, RR down to 20  -CPAP trial as tolerated if patient remains stable   -ABG q6h till stabilised and then decrease frequency    CV:  - Monitor SBP, wean off pressors  -IVF resuscitation as needed   - pending ECHO     GI:  -NPO/ngt  -elevated bilirubin, direct bilirubin elevated, normal liver enzymes, trend CMP q6h with lactates till stabilised   -ct ppi ppx till NPO      Heme:   -monitor H/H, s/p MTP: monitor for bleeding, CBC q6h till stabilised   - dropping H/H, 1U pRBC given O/N, continue to monitor  -DVT chem ppx on hold for now, ICDs in situ, start once clinically stable and not bleeding     Renal:  - Strict I&O, monitor lytes, replete as needed  - LR@100 m/hr resuscitation, transition to mIVF once stable and monitor for fluid shifts as pt is s/p MTP and large volume fluid resc     ID:   - ct meropenem for broadened coverage, no intra op cultures sent  -blood culture 1/18 f/u    Endo:  - diabetic, FS and low dose corrective regimen for hyperglycemia     Access: RIJ cordis, 2 PIV, 1 JOURDAN draining serosanguinous, ETT, Abthera VAC    Dispo: SICU for hemodynamic monitoring

## 2019-01-20 NOTE — PROGRESS NOTE ADULT - SUBJECTIVE AND OBJECTIVE BOX
General Surgery Progress Note    SUBJECTIVE:  The patient was seen and examined. No acute events overnight. Pain well controlled.    OBJECTIVE:     ** VITAL SIGNS / I&O's **    Vital Signs Last 24 Hrs  T(C): 38.1 (20 Jan 2019 04:00), Max: 38.1 (20 Jan 2019 04:00)  T(F): 100.6 (20 Jan 2019 04:00), Max: 100.6 (20 Jan 2019 04:00)  HR: 75 (20 Jan 2019 07:28) (68 - 100)  BP: --  BP(mean): --  RR: 20 (20 Jan 2019 06:00) (0 - 23)  SpO2: 97% (20 Jan 2019 07:28) (96% - 100%)  Mode: AC/ CMV (Assist Control/ Continuous Mandatory Ventilation)  RR (machine): 18  TV (machine): 400  FiO2: 30  PEEP: 5  MAP: 10  PIP: 24      19 Jan 2019 07:01  -  20 Jan 2019 07:00  --------------------------------------------------------  IN:    Albumin 5%  - 250 mL: 750 mL    fentaNYL Infusion.: 276 mL    IV PiggyBack: 700 mL    lactated ringers.: 3000 mL    midazolam Infusion: 29.3 mL    norepinephrine Infusion: 165.6 mL    Packed Red Blood Cells: 300 mL    vasopressin Infusion: 57.6 mL  Total IN: 5278.5 mL    OUT:    Bulb: 25 mL    Indwelling Catheter - Urethral: 465 mL    Nasoenteral Tube: 400 mL    VAC (Vacuum Assisted Closure) System: 1300 mL  Total OUT: 2190 mL    Total NET: 3088.5 mL          ** PHYSICAL EXAM **    -- CONSTITUTIONAL: NAD  -- PULMONARY: non-labored respirations, intubated  -- ABDOMEN: soft, non-distended, non-tender; vac to suction; JOURDAN w/ ss output      ** LABS **                          10.1   17.89 )-----------( 147      ( 20 Jan 2019 05:30 )             28.9     20 Jan 2019 05:30    135    |  99     |  8      ----------------------------<  104    4.4     |  19     |  0.64     Ca    8.4        20 Jan 2019 05:30  Phos  2.3       20 Jan 2019 05:30  Mg     2.0       20 Jan 2019 05:30    TPro  4.8    /  Alb  2.4    /  TBili  4.9    /  DBili  x      /  AST  31     /  ALT  15     /  AlkPhos  130    20 Jan 2019 05:30    PT/INR - ( 20 Jan 2019 05:30 )   PT: 19.9 SEC;   INR: 1.76          PTT - ( 20 Jan 2019 05:30 )  PTT:36.1 SEC  CAPILLARY BLOOD GLUCOSE      POCT Blood Glucose.: 115 mg/dL (19 Jan 2019 18:06)  POCT Blood Glucose.: 101 mg/dL (19 Jan 2019 12:23)    CARDIAC MARKERS ( 19 Jan 2019 04:30 )  x     / x     / 107 u/L / 3.01 ng/mL / x          LIVER FUNCTIONS - ( 20 Jan 2019 05:30 )  Alb: 2.4 g/dL / Pro: 4.8 g/dL / ALK PHOS: 130 u/L / ALT: 15 u/L / AST: 31 u/L / GGT: x             Culture - Blood (collected 18 Jan 2019 19:36)  Source: BLOOD PERIPHERAL  Preliminary Report (19 Jan 2019 21:53):    ***Blood Panel PCR results on this specimen are available    approximately 3 hours after the Gram stain result***    Gram stain, PCR, and/or culture results may not always    correspond due to difference in methodologies    ------------------------------------------------------------    This PCR assay was performed using [employer].  The    following targets are tested for:  Enterococcus, vancomycin    resistant enterococci, Listeria monocytogenes,  coagulase    negative staphylococci, S. aureus, methicillin resistant S.    aureus, Streptococcus agalactiae (Group B), S. pneumoniae,    S. pyogenes (Group A), Acinetobacter baumannii, Enterobacter    cloacae, E. coli, Klebsiella oxytoca, K. pneumoniae, Proteus    sp., Serratia marcescens, Haemophilus influenzae, Neisseria    meningitidis, Pseudomonas aeruginosa, Candida albicans, C.    glabrata, C. krusei, C. parapsilosis, C. tropicalis and the    KPC resistance gene.    **NOTE: Due to technical problems, Proteus sp. will NOT be    reported as part of the BCID paneluntil further notice.  Preliminary Report (19 Jan 2019 19:48):    NO ORGANISMS ISOLATED    NO ORGANISMS ISOLATED AT 24 HOURS  Organism: BLOOD CULTURE PCR (19 Jan 2019 21:53)  Organism: BLOOD CULTURE PCR (19 Jan 2019 21:53)          MEDICATIONS  (STANDING):  chlorhexidine 4% Liquid 1 Application(s) Topical <User Schedule>  fentaNYL   Infusion. 0.5 MICROgram(s)/kG/Hr (2.87 mL/Hr) IV Continuous <Continuous>  insulin lispro (HumaLOG) corrective regimen sliding scale   SubCutaneous every 6 hours  lactated ringers. 1000 milliLiter(s) (100 mL/Hr) IV Continuous <Continuous>  meropenem  IVPB 1000 milliGRAM(s) IV Intermittent every 8 hours  meropenem  IVPB      midazolam Infusion 0.02 mG/kG/Hr (1.148 mL/Hr) IV Continuous <Continuous>  norepinephrine Infusion 0.05 MICROgram(s)/kG/Min (2.691 mL/Hr) IV Continuous <Continuous>  pantoprazole  Injectable 40 milliGRAM(s) IV Push daily  vancomycin  IVPB 1000 milliGRAM(s) IV Intermittent every 12 hours  vancomycin  IVPB      vasopressin Infusion 0.04 Unit(s)/Min (2.4 mL/Hr) IV Continuous <Continuous>    MEDICATIONS  (PRN):

## 2019-01-20 NOTE — PROGRESS NOTE ADULT - ATTENDING COMMENTS
The patient is a critical care patient with hemodynamic and metabolic instability in SICU.  I have personally interviewed and examined this patient, reviewed labs and x-rays, discussed with other consultants, House staff and PA's.  I spent   45  minutes  in total providing critical care for the diagnoses, assessment and plan above.  These diagnoses are unrelated to the surgical procedure noted above.  I met with family  15   min to get further history and make care decisions for this patient who is unable to participate due to altered mental status.  Time involved in performance of separately billable procedures was not counted toward my critical care time.  There is no overlap.    Current Issues:  J96.01 Acute respiratory failure with hypoxia   - PaO2 downtrending, CXR shows pulm edema pattern.  Would like to diurese but patient remains on pressors with elevated lactate.  Maintain on current settings.  R57.9 Shock   - pressor requirements and pressor requirements downtrending  E87.2 Lactic acidosis   - resolving  D62 Acute posthemorrhagic anemia   - H/H stable.  no signs of clinically significant bleeding on exam

## 2019-01-20 NOTE — PROGRESS NOTE ADULT - ASSESSMENT
54y Female PMHx HTN, T2DM, asthma, depression with recent diagnosis of invasive ampullary adenocarcinoma of the CBD whom underwent an uneventful whipple 1/11/18. Patient has had an uptrending WBC since procedure, initially attributed to not restarting antibiotics for cholangitis, but experienced abdominal tenderness/distention and tachycardia yesterday and was transferred to SICU for close hemodynamic monitoring. Now s/p Celiotomy, evacuation of hematoma, cessation of gastroduodenal artery bleeding, and placement of Abthera vac 1/18. still requiring pressors for BP management.    PLAN:  -continue care per SICU  -Pain control as needed  -Monitor vs, uop  -Serial CBCs, H/H  -cont meropenem and vanc  -npo/ivf

## 2019-01-20 NOTE — CHART NOTE - NSCHARTNOTEFT_GEN_A_CORE
Pre-operative Note    - Procedure: abdominal washout, possible vac exchange, possible abd wall closure    - Labs:                        10.1   17.89 )-----------( 147      ( 20 Jan 2019 05:30 )             28.9     01-20    135  |  99  |  8   ----------------------------<  104<H>  4.4   |  19<L>  |  0.64    Ca    8.4      20 Jan 2019 05:30  Phos  2.3     01-20  Mg     2.0     01-20    TPro  4.8<L>  /  Alb  2.4<L>  /  TBili  4.9<H>  /  DBili  x   /  AST  31  /  ALT  15  /  AlkPhos  130<H>  01-20    PT/INR - ( 20 Jan 2019 05:30 )   PT: 19.9 SEC;   INR: 1.76          PTT - ( 20 Jan 2019 05:30 )  PTT:36.1 SEC  - Type & Screen: on file  - CXR: on file   - EKG: on file    - Orders:  > NPO at midnight  > IVF at midnight  > Morning Labs: CBC, BMP, coags, type & screen      - Permits:  > Consent in chart.  > Case scheduled with OR.

## 2019-01-21 LAB
ALBUMIN SERPL ELPH-MCNC: 2 G/DL — LOW (ref 3.3–5)
ALBUMIN SERPL ELPH-MCNC: 2.1 G/DL — LOW (ref 3.3–5)
ALP SERPL-CCNC: 129 U/L — HIGH (ref 40–120)
ALP SERPL-CCNC: 138 U/L — HIGH (ref 40–120)
ALT FLD-CCNC: 14 U/L — SIGNIFICANT CHANGE UP (ref 4–33)
ALT FLD-CCNC: 16 U/L — SIGNIFICANT CHANGE UP (ref 4–33)
ANION GAP SERPL CALC-SCNC: 10 MMO/L — SIGNIFICANT CHANGE UP (ref 7–14)
ANION GAP SERPL CALC-SCNC: 11 MMO/L — SIGNIFICANT CHANGE UP (ref 7–14)
ANION GAP SERPL CALC-SCNC: 9 MMO/L — SIGNIFICANT CHANGE UP (ref 7–14)
APPEARANCE UR: SIGNIFICANT CHANGE UP
APTT BLD: 33.7 SEC — SIGNIFICANT CHANGE UP (ref 27.5–36.3)
APTT BLD: 36.2 SEC — SIGNIFICANT CHANGE UP (ref 27.5–36.3)
AST SERPL-CCNC: 37 U/L — HIGH (ref 4–32)
AST SERPL-CCNC: 58 U/L — HIGH (ref 4–32)
BACTERIA # UR AUTO: HIGH
BASE EXCESS BLDA CALC-SCNC: -0.9 MMOL/L — SIGNIFICANT CHANGE UP
BASE EXCESS BLDA CALC-SCNC: -1 MMOL/L — SIGNIFICANT CHANGE UP
BASE EXCESS BLDA CALC-SCNC: 0 MMOL/L — SIGNIFICANT CHANGE UP
BASE EXCESS BLDA CALC-SCNC: 1.4 MMOL/L — SIGNIFICANT CHANGE UP
BILIRUB SERPL-MCNC: 4.7 MG/DL — HIGH (ref 0.2–1.2)
BILIRUB SERPL-MCNC: 4.7 MG/DL — HIGH (ref 0.2–1.2)
BILIRUB UR-MCNC: SIGNIFICANT CHANGE UP
BLD GP AB SCN SERPL QL: NEGATIVE — SIGNIFICANT CHANGE UP
BLOOD UR QL VISUAL: SIGNIFICANT CHANGE UP
BUN SERPL-MCNC: 12 MG/DL — SIGNIFICANT CHANGE UP (ref 7–23)
BUN SERPL-MCNC: 15 MG/DL — SIGNIFICANT CHANGE UP (ref 7–23)
BUN SERPL-MCNC: 15 MG/DL — SIGNIFICANT CHANGE UP (ref 7–23)
CA-I BLD-SCNC: 1.09 MMOL/L — SIGNIFICANT CHANGE UP (ref 1.03–1.23)
CA-I BLDA-SCNC: 1.09 MMOL/L — LOW (ref 1.15–1.29)
CA-I BLDA-SCNC: 1.13 MMOL/L — LOW (ref 1.15–1.29)
CA-I BLDA-SCNC: 1.14 MMOL/L — LOW (ref 1.15–1.29)
CA-I BLDA-SCNC: 1.21 MMOL/L — SIGNIFICANT CHANGE UP (ref 1.15–1.29)
CALCIUM SERPL-MCNC: 7.4 MG/DL — LOW (ref 8.4–10.5)
CALCIUM SERPL-MCNC: 7.7 MG/DL — LOW (ref 8.4–10.5)
CALCIUM SERPL-MCNC: 8 MG/DL — LOW (ref 8.4–10.5)
CHLORIDE SERPL-SCNC: 100 MMOL/L — SIGNIFICANT CHANGE UP (ref 98–107)
CHLORIDE SERPL-SCNC: 97 MMOL/L — LOW (ref 98–107)
CHLORIDE SERPL-SCNC: 98 MMOL/L — SIGNIFICANT CHANGE UP (ref 98–107)
CO2 SERPL-SCNC: 22 MMOL/L — SIGNIFICANT CHANGE UP (ref 22–31)
COLOR SPEC: SIGNIFICANT CHANGE UP
CREAT ?TM UR-MCNC: 123 MG/DL — SIGNIFICANT CHANGE UP
CREAT SERPL-MCNC: 0.54 MG/DL — SIGNIFICANT CHANGE UP (ref 0.5–1.3)
CREAT SERPL-MCNC: 0.63 MG/DL — SIGNIFICANT CHANGE UP (ref 0.5–1.3)
CREAT SERPL-MCNC: 0.65 MG/DL — SIGNIFICANT CHANGE UP (ref 0.5–1.3)
GLUCOSE BLDA-MCNC: 124 MG/DL — HIGH (ref 70–99)
GLUCOSE BLDA-MCNC: 139 MG/DL — HIGH (ref 70–99)
GLUCOSE BLDA-MCNC: 153 MG/DL — HIGH (ref 70–99)
GLUCOSE BLDA-MCNC: 161 MG/DL — HIGH (ref 70–99)
GLUCOSE SERPL-MCNC: 122 MG/DL — HIGH (ref 70–99)
GLUCOSE SERPL-MCNC: 141 MG/DL — HIGH (ref 70–99)
GLUCOSE SERPL-MCNC: 174 MG/DL — HIGH (ref 70–99)
GLUCOSE UR-MCNC: NEGATIVE — SIGNIFICANT CHANGE UP
HCO3 BLDA-SCNC: 24 MMOL/L — SIGNIFICANT CHANGE UP (ref 22–26)
HCO3 BLDA-SCNC: 24 MMOL/L — SIGNIFICANT CHANGE UP (ref 22–26)
HCO3 BLDA-SCNC: 25 MMOL/L — SIGNIFICANT CHANGE UP (ref 22–26)
HCO3 BLDA-SCNC: 26 MMOL/L — SIGNIFICANT CHANGE UP (ref 22–26)
HCT VFR BLD CALC: 28.1 % — LOW (ref 34.5–45)
HCT VFR BLD CALC: 28.7 % — LOW (ref 34.5–45)
HCT VFR BLDA CALC: 29.3 % — LOW (ref 34.5–46.5)
HCT VFR BLDA CALC: 30.4 % — LOW (ref 34.5–46.5)
HCT VFR BLDA CALC: 30.8 % — LOW (ref 34.5–46.5)
HCT VFR BLDA CALC: 31.5 % — LOW (ref 34.5–46.5)
HGB BLD-MCNC: 9.4 G/DL — LOW (ref 11.5–15.5)
HGB BLD-MCNC: 9.5 G/DL — LOW (ref 11.5–15.5)
HGB BLDA-MCNC: 10.2 G/DL — LOW (ref 11.5–15.5)
HGB BLDA-MCNC: 9.5 G/DL — LOW (ref 11.5–15.5)
HGB BLDA-MCNC: 9.8 G/DL — LOW (ref 11.5–15.5)
HGB BLDA-MCNC: 9.9 G/DL — LOW (ref 11.5–15.5)
INR BLD: 1.5 — HIGH (ref 0.88–1.17)
KETONES UR-MCNC: SIGNIFICANT CHANGE UP
LACTATE BLDA-SCNC: 2.1 MMOL/L — HIGH (ref 0.5–2)
LACTATE BLDA-SCNC: 2.2 MMOL/L — HIGH (ref 0.5–2)
LEUKOCYTE ESTERASE UR-ACNC: NEGATIVE — SIGNIFICANT CHANGE UP
MAGNESIUM SERPL-MCNC: 1.9 MG/DL — SIGNIFICANT CHANGE UP (ref 1.6–2.6)
MAGNESIUM SERPL-MCNC: 2 MG/DL — SIGNIFICANT CHANGE UP (ref 1.6–2.6)
MCHC RBC-ENTMCNC: 27 PG — SIGNIFICANT CHANGE UP (ref 27–34)
MCHC RBC-ENTMCNC: 27.3 PG — SIGNIFICANT CHANGE UP (ref 27–34)
MCHC RBC-ENTMCNC: 33.1 % — SIGNIFICANT CHANGE UP (ref 32–36)
MCHC RBC-ENTMCNC: 33.5 % — SIGNIFICANT CHANGE UP (ref 32–36)
MCV RBC AUTO: 80.7 FL — SIGNIFICANT CHANGE UP (ref 80–100)
MCV RBC AUTO: 82.5 FL — SIGNIFICANT CHANGE UP (ref 80–100)
MUCOUS THREADS # UR AUTO: SIGNIFICANT CHANGE UP
NITRITE UR-MCNC: POSITIVE — SIGNIFICANT CHANGE UP
NRBC # FLD: 0.09 K/UL — LOW (ref 25–125)
NRBC # FLD: 0.1 K/UL — LOW (ref 25–125)
ORGANISM # SPEC MICROSCOPIC CNT: SIGNIFICANT CHANGE UP
ORGANISM # SPEC MICROSCOPIC CNT: SIGNIFICANT CHANGE UP
OSMOLALITY SERPL: 281 MOSMO/KG — SIGNIFICANT CHANGE UP (ref 275–295)
OSMOLALITY UR: 719 MOSMO/KG — SIGNIFICANT CHANGE UP (ref 50–1200)
PCO2 BLDA: 34 MMHG — SIGNIFICANT CHANGE UP (ref 32–48)
PCO2 BLDA: 37 MMHG — SIGNIFICANT CHANGE UP (ref 32–48)
PCO2 BLDA: 38 MMHG — SIGNIFICANT CHANGE UP (ref 32–48)
PCO2 BLDA: 40 MMHG — SIGNIFICANT CHANGE UP (ref 32–48)
PH BLDA: 7.39 PH — SIGNIFICANT CHANGE UP (ref 7.35–7.45)
PH BLDA: 7.43 PH — SIGNIFICANT CHANGE UP (ref 7.35–7.45)
PH BLDA: 7.44 PH — SIGNIFICANT CHANGE UP (ref 7.35–7.45)
PH BLDA: 7.44 PH — SIGNIFICANT CHANGE UP (ref 7.35–7.45)
PH UR: 6 — SIGNIFICANT CHANGE UP (ref 5–8)
PHOSPHATE SERPL-MCNC: 2.2 MG/DL — LOW (ref 2.5–4.5)
PHOSPHATE SERPL-MCNC: 3.4 MG/DL — SIGNIFICANT CHANGE UP (ref 2.5–4.5)
PLATELET # BLD AUTO: 134 K/UL — LOW (ref 150–400)
PLATELET # BLD AUTO: 152 K/UL — SIGNIFICANT CHANGE UP (ref 150–400)
PMV BLD: 10.9 FL — SIGNIFICANT CHANGE UP (ref 7–13)
PMV BLD: 11.1 FL — SIGNIFICANT CHANGE UP (ref 7–13)
PO2 BLDA: 65 MMHG — LOW (ref 83–108)
PO2 BLDA: 80 MMHG — LOW (ref 83–108)
PO2 BLDA: 84 MMHG — SIGNIFICANT CHANGE UP (ref 83–108)
PO2 BLDA: 89 MMHG — SIGNIFICANT CHANGE UP (ref 83–108)
POTASSIUM BLDA-SCNC: 3.9 MMOL/L — SIGNIFICANT CHANGE UP (ref 3.4–4.5)
POTASSIUM BLDA-SCNC: 4.1 MMOL/L — SIGNIFICANT CHANGE UP (ref 3.4–4.5)
POTASSIUM SERPL-MCNC: 4.2 MMOL/L — SIGNIFICANT CHANGE UP (ref 3.5–5.3)
POTASSIUM SERPL-MCNC: 4.4 MMOL/L — SIGNIFICANT CHANGE UP (ref 3.5–5.3)
POTASSIUM SERPL-MCNC: 4.4 MMOL/L — SIGNIFICANT CHANGE UP (ref 3.5–5.3)
POTASSIUM SERPL-SCNC: 4.2 MMOL/L — SIGNIFICANT CHANGE UP (ref 3.5–5.3)
POTASSIUM SERPL-SCNC: 4.4 MMOL/L — SIGNIFICANT CHANGE UP (ref 3.5–5.3)
POTASSIUM SERPL-SCNC: 4.4 MMOL/L — SIGNIFICANT CHANGE UP (ref 3.5–5.3)
PROT SERPL-MCNC: 4.6 G/DL — LOW (ref 6–8.3)
PROT SERPL-MCNC: 4.8 G/DL — LOW (ref 6–8.3)
PROT UR-MCNC: 100 — HIGH
PROTHROM AB SERPL-ACNC: 17.3 SEC — HIGH (ref 9.8–13.1)
RBC # BLD: 3.48 M/UL — LOW (ref 3.8–5.2)
RBC # BLD: 3.48 M/UL — LOW (ref 3.8–5.2)
RBC # FLD: 18.6 % — HIGH (ref 10.3–14.5)
RBC # FLD: 18.7 % — HIGH (ref 10.3–14.5)
RBC CASTS # UR COMP ASSIST: SIGNIFICANT CHANGE UP (ref 0–?)
RH IG SCN BLD-IMP: POSITIVE — SIGNIFICANT CHANGE UP
SAO2 % BLDA: 93.4 % — LOW (ref 95–99)
SAO2 % BLDA: 95.9 % — SIGNIFICANT CHANGE UP (ref 95–99)
SAO2 % BLDA: 96.8 % — SIGNIFICANT CHANGE UP (ref 95–99)
SAO2 % BLDA: 97.2 % — SIGNIFICANT CHANGE UP (ref 95–99)
SODIUM BLDA-SCNC: 126 MMOL/L — LOW (ref 136–146)
SODIUM BLDA-SCNC: 126 MMOL/L — LOW (ref 136–146)
SODIUM BLDA-SCNC: 127 MMOL/L — LOW (ref 136–146)
SODIUM BLDA-SCNC: 129 MMOL/L — LOW (ref 136–146)
SODIUM SERPL-SCNC: 129 MMOL/L — LOW (ref 135–145)
SODIUM SERPL-SCNC: 130 MMOL/L — LOW (ref 135–145)
SODIUM SERPL-SCNC: 132 MMOL/L — LOW (ref 135–145)
SODIUM UR-SCNC: < 20 MMOL/L — SIGNIFICANT CHANGE UP
SP GR SPEC: 1.03 — SIGNIFICANT CHANGE UP (ref 1–1.04)
SQUAMOUS # UR AUTO: SIGNIFICANT CHANGE UP
UROBILINOGEN FLD QL: NORMAL — SIGNIFICANT CHANGE UP
UUN UR-MCNC: 725.4 MG/DL — SIGNIFICANT CHANGE UP
WBC # BLD: 21.63 K/UL — HIGH (ref 3.8–10.5)
WBC # BLD: 22.17 K/UL — HIGH (ref 3.8–10.5)
WBC # FLD AUTO: 21.63 K/UL — HIGH (ref 3.8–10.5)
WBC # FLD AUTO: 22.17 K/UL — HIGH (ref 3.8–10.5)
WBC UR QL: SIGNIFICANT CHANGE UP (ref 0–?)
YEAST BUDDING # UR COMP ASSIST: SIGNIFICANT CHANGE UP

## 2019-01-21 PROCEDURE — 49560: CPT | Mod: 78

## 2019-01-21 PROCEDURE — 14001 TIS TRNFR TRUNK 10.1-30SQCM: CPT | Mod: 78

## 2019-01-21 PROCEDURE — 49020 DRAINAGE ABDOM ABSCESS OPEN: CPT | Mod: 78

## 2019-01-21 PROCEDURE — 99291 CRITICAL CARE FIRST HOUR: CPT

## 2019-01-21 RX ORDER — ACETAMINOPHEN 500 MG
1000 TABLET ORAL ONCE
Qty: 0 | Refills: 0 | Status: COMPLETED | OUTPATIENT
Start: 2019-01-21 | End: 2019-01-21

## 2019-01-21 RX ORDER — SODIUM CHLORIDE 9 MG/ML
1000 INJECTION INTRAMUSCULAR; INTRAVENOUS; SUBCUTANEOUS
Qty: 0 | Refills: 0 | Status: DISCONTINUED | OUTPATIENT
Start: 2019-01-21 | End: 2019-01-22

## 2019-01-21 RX ORDER — HYDROMORPHONE HYDROCHLORIDE 2 MG/ML
0.5 INJECTION INTRAMUSCULAR; INTRAVENOUS; SUBCUTANEOUS ONCE
Qty: 0 | Refills: 0 | Status: DISCONTINUED | OUTPATIENT
Start: 2019-01-21 | End: 2019-01-21

## 2019-01-21 RX ORDER — ALBUMIN HUMAN 25 %
250 VIAL (ML) INTRAVENOUS
Qty: 0 | Refills: 0 | Status: COMPLETED | OUTPATIENT
Start: 2019-01-21 | End: 2019-01-21

## 2019-01-21 RX ADMIN — MEROPENEM 100 MILLIGRAM(S): 1 INJECTION INTRAVENOUS at 13:12

## 2019-01-21 RX ADMIN — Medication 1000 MILLIGRAM(S): at 18:36

## 2019-01-21 RX ADMIN — HYDROMORPHONE HYDROCHLORIDE 0.5 MILLIGRAM(S): 2 INJECTION INTRAMUSCULAR; INTRAVENOUS; SUBCUTANEOUS at 17:30

## 2019-01-21 RX ADMIN — Medication 250 MILLIGRAM(S): at 05:20

## 2019-01-21 RX ADMIN — Medication 1000 MILLIGRAM(S): at 02:30

## 2019-01-21 RX ADMIN — PANTOPRAZOLE SODIUM 40 MILLIGRAM(S): 20 TABLET, DELAYED RELEASE ORAL at 12:06

## 2019-01-21 RX ADMIN — Medication 400 MILLIGRAM(S): at 01:59

## 2019-01-21 RX ADMIN — HYDROMORPHONE HYDROCHLORIDE 0.5 MILLIGRAM(S): 2 INJECTION INTRAMUSCULAR; INTRAVENOUS; SUBCUTANEOUS at 18:10

## 2019-01-21 RX ADMIN — Medication 250 MILLIGRAM(S): at 18:08

## 2019-01-21 RX ADMIN — Medication 1: at 06:27

## 2019-01-21 RX ADMIN — Medication 500 MILLILITER(S): at 16:45

## 2019-01-21 RX ADMIN — Medication 500 MILLILITER(S): at 15:00

## 2019-01-21 RX ADMIN — Medication 400 MILLIGRAM(S): at 18:21

## 2019-01-21 RX ADMIN — MEROPENEM 100 MILLIGRAM(S): 1 INJECTION INTRAVENOUS at 23:06

## 2019-01-21 RX ADMIN — ENOXAPARIN SODIUM 40 MILLIGRAM(S): 100 INJECTION SUBCUTANEOUS at 12:06

## 2019-01-21 RX ADMIN — MEROPENEM 100 MILLIGRAM(S): 1 INJECTION INTRAVENOUS at 05:20

## 2019-01-21 RX ADMIN — Medication 63.75 MILLIMOLE(S): at 05:20

## 2019-01-21 NOTE — CHART NOTE - NSCHARTNOTEFT_GEN_A_CORE
POST-OPERATIVE NOTE    Subjective:  Patient is s/p abdominal washout, inna drain placement at pancreaticojejunostomy, fascia bridging with stratice mesh, and JOURDAN placement above fascia. Patient remains intubated in SICU and non-responsive. Patient continues to have low urine output that appears to be pre-renal. Patient is currently not on pressors.     Objective:  Vital Signs Last 24 Hrs  T(C): 36.3 (21 Jan 2019 11:30), Max: 38.2 (20 Jan 2019 16:00)  T(F): 97.3 (21 Jan 2019 11:30), Max: 100.8 (20 Jan 2019 16:00)  HR: 78 (21 Jan 2019 15:00) (56 - 82)  BP: 104/50 (21 Jan 2019 02:26) (104/50 - 104/50)  BP(mean): --  RR: 7 (21 Jan 2019 15:00) (0 - 23)  SpO2: 98% (21 Jan 2019 15:00) (94% - 99%)  I&O's Detail    20 Jan 2019 07:01  -  21 Jan 2019 07:00  --------------------------------------------------------  IN:    fentaNYL Infusion.: 46 mL    IV PiggyBack: 1200 mL    lactated ringers.: 2025 mL    midazolam Infusion: 9.2 mL    norepinephrine Infusion: 13.1 mL    sodium chloride 0.9%.: 150 mL    vasopressin Infusion: 52.8 mL  Total IN: 3496.1 mL    OUT:    Bulb: 18 mL    Indwelling Catheter - Urethral: 205 mL    Nasoenteral Tube: 150 mL    VAC (Vacuum Assisted Closure) System: 950 mL  Total OUT: 1323 mL    Total NET: 2173.1 mL      21 Jan 2019 07:01  -  21 Jan 2019 15:53  --------------------------------------------------------  IN:    IV PiggyBack: 50 mL    sodium chloride 0.9%.: 500 mL    vasopressin Infusion: 2.4 mL  Total IN: 552.4 mL    OUT:    Bulb: 25 mL    Bulb: 60 mL    Bulb: 60 mL    Indwelling Catheter - Urethral: 60 mL  Total OUT: 205 mL    Total NET: 347.4 mL        meropenem  IVPB 1000  meropenem  IVPB   vancomycin  IVPB   vancomycin  IVPB 1000  enoxaparin Injectable 40  meropenem  IVPB 1000  meropenem  IVPB   norepinephrine Infusion 0.05  vancomycin  IVPB   vancomycin  IVPB 1000    PAST MEDICAL & SURGICAL HISTORY:  Adenocarcinoma of gallbladder  Type 2 diabetes mellitus without complication, without long-term current use of insulin  Neuropathy  Arthritis  Mild intermittent asthma without complication  Gastroesophageal reflux disease without esophagitis  Essential hypertension  No significant past surgical history        Physical Exam:  General: NAD, resting comfortably in bed, intubated, NGT to suction with dark output, non-responsive  Pulmonary: Nonlabored breathing via ventilator  Cardiovascular: RRR  Abdominal: soft, mildly distended, midline dressing c/d/i, abdominal binder in place, RLQ and LLQ JOURDAN dark sanguinous, RUQ JOURDAN with lighter SS output  Extremities: WWP  : sinha in place draining yellow urine      LABS:                        9.4    22.17 )-----------( 134      ( 21 Jan 2019 02:20 )             28.1     01-21    130<L>  |  97<L>  |  15  ----------------------------<  174<H>  4.2   |  22  |  0.63    Ca    7.7<L>      21 Jan 2019 08:15  Phos  2.2     01-21  Mg     2.0     01-21    TPro  4.6<L>  /  Alb  2.0<L>  /  TBili  4.7<H>  /  DBili  x   /  AST  37<H>  /  ALT  14  /  AlkPhos  129<H>  01-21    PT/INR - ( 21 Jan 2019 02:20 )   PT: 17.3 SEC;   INR: 1.50          PTT - ( 21 Jan 2019 02:20 )  PTT:33.7 SEC  CAPILLARY BLOOD GLUCOSE      POCT Blood Glucose.: 150 mg/dL (21 Jan 2019 11:49)  POCT Blood Glucose.: 177 mg/dL (21 Jan 2019 06:26)  POCT Blood Glucose.: 130 mg/dL (20 Jan 2019 23:02)  POCT Blood Glucose.: 100 mg/dL (20 Jan 2019 17:23)      Radiology and Additional Studies:    Assessment:  The patient is a 54y Female who is now several hours post-op from a abdominal washout, inna drain placement at pancreaticojejunostomy, fascia bridging with stratice mesh, and JOURDAN placement above fascia.     Plan:  - Pain control as needed  - lovenox for DVT ppx  - monitor UOP, pt to receive 250cc bolus albumin now  - wean vent as tolerated  - maintain off pressors as tolerated  - appreciate SICU care    D Team Surgery  [id number]

## 2019-01-21 NOTE — BRIEF OPERATIVE NOTE - PROCEDURE
<<-----Click on this checkbox to enter Procedure Abdominal wall closure  01/21/2019    Active  [id number]

## 2019-01-21 NOTE — PROGRESS NOTE ADULT - SUBJECTIVE AND OBJECTIVE BOX
SICU Daily Progress Note  =====================================================  Interval/Overnight Events:     No acute overnight events, patient remained on vaso overnight with low UOP.     POD #          	SICU Day #    ***    HISTORY OF PRESENT ILLNESS:  NOEMI SOUSA is a 54y Female PMHx HTN, T2DM, asthma, depression with recent diagnosis of invasive ampullary adenocarcinoma of the CBD whom underwent an uneventful whipple 1/11/18. Patient has had an uptrending WBC since procedure, initially attributed to not restarting antibiotics for cholangitis, but presents today with abdominal tenderness/distention and tachycardia. Admitted to SICU for close hemodynamic monitoring.    PAST MEDICAL HISTORY:   Adenocarcinoma of gallbladder  Type 2 diabetes mellitus without complication, without long-term current use of insulin  Neuropathy  Arthritis  Mild intermittent asthma without complication  Gastroesophageal reflux disease without esophagitis  Essential hypertension    PAST SURGICAL HISTORY: No significant past surgical history      FAMILY HISTORY: No pertinent family history in first degree relatives    ALLERGIES: No Known Allergies      MEDICATIONS:   --------------------------------------------------------------------------------------  Neurologic Medications  fentaNYL   Infusion. 0.5 MICROgram(s)/kG/Hr IV Continuous <Continuous>  midazolam Infusion 0.02 mG/kG/Hr IV Continuous <Continuous>    Respiratory Medications    Cardiovascular Medications  norepinephrine Infusion 0.05 MICROgram(s)/kG/Min IV Continuous <Continuous>    Gastrointestinal Medications  lactated ringers. 1000 milliLiter(s) IV Continuous <Continuous>  pantoprazole  Injectable 40 milliGRAM(s) IV Push daily    Genitourinary Medications    Hematologic/Oncologic Medications  enoxaparin Injectable 40 milliGRAM(s) SubCutaneous daily    Antimicrobial/Immunologic Medications  meropenem  IVPB 1000 milliGRAM(s) IV Intermittent every 8 hours  meropenem  IVPB      vancomycin  IVPB      vancomycin  IVPB 1000 milliGRAM(s) IV Intermittent every 12 hours    Endocrine/Metabolic Medications  insulin lispro (HumaLOG) corrective regimen sliding scale   SubCutaneous every 6 hours  vasopressin Infusion 0.04 Unit(s)/Min IV Continuous <Continuous>    Topical/Other Medications  chlorhexidine 4% Liquid 1 Application(s) Topical <User Schedule>    --------------------------------------------------------------------------------------    VITAL SIGNS, INS/OUTS (last 24 hours):  --------------------------------------------------------------------------------------  ((Insert SICU Vitals/Is+Os here))***  --------------------------------------------------------------------------------------    EXAM  NEUROLOGY  RASS: 0  Exam: NAD, sedated on propofol and fentanyl     HEENT  Exam: Normocephalic, atraumatic, EOMI.      RESPIRATORY  Exam: Lungs clear to auscultation, Normal expansion/effort.   Mechanical Ventilation: Mode: AC/ CMV (Assist Control/ Continuous Mandatory Ventilation), RR (machine): 24, TV (machine): 400, FiO2: 50, PEEP: 5, MAP: 11.7, PIP: 23    CARDIOVASCULAR  Exam: S1, S2.  Regular rate and rhythm.       GI/NUTRITION  Exam: Abdomen soft, Non-tender, Non-distended.    Wound: abthera VAC in place, Reece drain in place with sanguinous drainage.     VASCULAR  Exam: Extremities warm, pink, well-perfused.     MUSCULOSKELETAL  Exam: All extremities moving spontaneously without limitations.     SKIN  Exam: Good skin turgor, no skin breakdown.   METABOLIC/FLUIDS/ELECTROLYTES  lactated ringers. 1000 milliLiter(s) IV Continuous <Continuous>      HEMATOLOGIC  [x] VTE Prophylaxis: enoxaparin Injectable 40 milliGRAM(s) SubCutaneous daily    Transfusions:	[] PRBC	[] Platelets		[] FFP	[] Cryoprecipitate    INFECTIOUS DISEASE  Antimicrobials/Immunologic Medications:  meropenem  IVPB 1000 milliGRAM(s) IV Intermittent every 8 hours  meropenem  IVPB      vancomycin  IVPB      vancomycin  IVPB 1000 milliGRAM(s) IV Intermittent every 12 hours    Day #      of     ***    Tubes/Lines/Drains  ***  [x] Peripheral IV  [] Central Venous Line     	[] R	[] L	[] IJ	[] Fem	[] SC	Date Placed:   [] Arterial Line		[] R	[] L	[] Fem	[] Rad	[] Ax	Date Placed:   [] PICC		[] Midline		[] Mediport  [] Urinary Catheter		Date Placed:   [x] Necessity of urinary, arterial, and venous catheters discussed    LABS  --------------------------------------------------------------------------------------  ((Insert SICU Labs here))***  --------------------------------------------------------------------------------------    OTHER LABORATORY:     IMAGING STUDIES:   CXR:     54y Female PMHx HTN, T2DM, asthma, depression with recent diagnosis of invasive ampullary adenocarcinoma of the CBD whom underwent an uneventful whipple 1/11/18. Patient has had an uptrending WBC since procedure, initially attributed to not restarting antibiotics for cholangitis, but presents today with abdominal tenderness/distention and tachycardia. Admitted to SICU for close hemodynamic monitoring.    Neuro:  - on fentanyl and prop, wean off   -give versed prn for agitation/discomfort, can start precedex infusion  -daily sedation vacation in AM    Resp:  -wean off vent, FiO2 to 30%, RR down to 20  -CPAP trial as tolerated if patient remains stable   -ABG q6h till stabilised and then decrease frequency    CV:  - Monitor SBP, wean off pressors  -IVF resuscitation as needed   - pending ECHO     GI:  -NPO/ngt  -elevated bilirubin, direct bilirubin elevated, normal liver enzymes, trend CMP q6h with lactates till stabilised   -ct ppi ppx till NPO      Heme:   -monitor H/H, s/p MTP: monitor for bleeding, CBC q6h till stabilised   - dropping H/H, 1U pRBC given O/N, continue to monitor  -DVT chem ppx on hold for now, ICDs in situ, start once clinically stable and not bleeding     Renal:  - Strict I&O, monitor lytes, replete as needed  - LR@100 m/hr resuscitation, transition to mIVF once stable and monitor for fluid shifts as pt is s/p MTP and large volume fluid resc     ID:   - ct meropenem for broadened coverage, no intra op cultures sent  -blood culture 1/18 f/u    Endo:  - diabetic, FS and low dose corrective regimen for hyperglycemia     Access: RIJ cordis, 2 PIV, 1 JOURDAN draining serosanguinous, ETT, Abthera VAC    Dispo: SICU for hemodynamic monitoring     --------------------------------------------------------------------------------------    Critical Care Diagnoses: SICU Daily Progress Note  =====================================================  Interval/Overnight Events:     No acute overnight events, patient remained on vaso overnight with low UOP.  Returning to OR today for possible closure vs abthera VAC change.     POD #          	SICU Day #    ***    HISTORY OF PRESENT ILLNESS:  NOEMI SOUSA is a 54y Female PMHx HTN, T2DM, asthma, depression with recent diagnosis of invasive ampullary adenocarcinoma of the CBD whom underwent an uneventful whipple 1/11/18. Patient has had an uptrending WBC since procedure, initially attributed to not restarting antibiotics for cholangitis, but presents today with abdominal tenderness/distention and tachycardia. Admitted to SICU for close hemodynamic monitoring.    PAST MEDICAL HISTORY:   Adenocarcinoma of gallbladder  Type 2 diabetes mellitus without complication, without long-term current use of insulin  Neuropathy  Arthritis  Mild intermittent asthma without complication  Gastroesophageal reflux disease without esophagitis  Essential hypertension    PAST SURGICAL HISTORY: No significant past surgical history      FAMILY HISTORY: No pertinent family history in first degree relatives    ALLERGIES: No Known Allergies      MEDICATIONS:   --------------------------------------------------------------------------------------  Neurologic Medications  fentaNYL   Infusion. 0.5 MICROgram(s)/kG/Hr IV Continuous <Continuous>  midazolam Infusion 0.02 mG/kG/Hr IV Continuous <Continuous>    Respiratory Medications    Cardiovascular Medications  norepinephrine Infusion 0.05 MICROgram(s)/kG/Min IV Continuous <Continuous>    Gastrointestinal Medications  lactated ringers. 1000 milliLiter(s) IV Continuous <Continuous>  pantoprazole  Injectable 40 milliGRAM(s) IV Push daily    Genitourinary Medications    Hematologic/Oncologic Medications  enoxaparin Injectable 40 milliGRAM(s) SubCutaneous daily    Antimicrobial/Immunologic Medications  meropenem  IVPB 1000 milliGRAM(s) IV Intermittent every 8 hours  meropenem  IVPB      vancomycin  IVPB      vancomycin  IVPB 1000 milliGRAM(s) IV Intermittent every 12 hours    Endocrine/Metabolic Medications  insulin lispro (HumaLOG) corrective regimen sliding scale   SubCutaneous every 6 hours  vasopressin Infusion 0.04 Unit(s)/Min IV Continuous <Continuous>    Topical/Other Medications  chlorhexidine 4% Liquid 1 Application(s) Topical <User Schedule>    --------------------------------------------------------------------------------------    VITAL SIGNS, INS/OUTS (last 24 hours):  --------------------------------------------------------------------------------------  ((Insert SICU Vitals/Is+Os here))***  --------------------------------------------------------------------------------------    EXAM  NEUROLOGY  RASS: 0  Exam: NAD, sedated on propofol and fentanyl     HEENT  Exam: Normocephalic, atraumatic, EOMI.      RESPIRATORY  Exam: Lungs clear to auscultation, Normal expansion/effort.   Mechanical Ventilation: Mode: AC/ CMV (Assist Control/ Continuous Mandatory Ventilation), RR (machine): 24, TV (machine): 400, FiO2: 50, PEEP: 5, MAP: 11.7, PIP: 23    CARDIOVASCULAR  Exam: S1, S2.  Regular rate and rhythm.       GI/NUTRITION  Exam: Abdomen soft, Non-tender, Non-distended.    Wound: abthera VAC in place, Reece drain in place with sanguinous drainage.     VASCULAR  Exam: Extremities warm, pink, well-perfused.     MUSCULOSKELETAL  Exam: All extremities moving spontaneously without limitations.     SKIN  Exam: Good skin turgor, no skin breakdown.   METABOLIC/FLUIDS/ELECTROLYTES  lactated ringers. 1000 milliLiter(s) IV Continuous <Continuous>      HEMATOLOGIC  [x] VTE Prophylaxis: enoxaparin Injectable 40 milliGRAM(s) SubCutaneous daily    Transfusions:	[] PRBC	[] Platelets		[] FFP	[] Cryoprecipitate    INFECTIOUS DISEASE  Antimicrobials/Immunologic Medications:  meropenem  IVPB 1000 milliGRAM(s) IV Intermittent every 8 hours  meropenem  IVPB      vancomycin  IVPB      vancomycin  IVPB 1000 milliGRAM(s) IV Intermittent every 12 hours    Day #      of     ***    Tubes/Lines/Drains  ***  [x] Peripheral IV  [] Central Venous Line     	[] R	[] L	[] IJ	[] Fem	[] SC	Date Placed:   [] Arterial Line		[] R	[] L	[] Fem	[] Rad	[] Ax	Date Placed:   [] PICC		[] Midline		[] Mediport  [] Urinary Catheter		Date Placed:   [x] Necessity of urinary, arterial, and venous catheters discussed    LABS  --------------------------------------------------------------------------------------  ((Insert SICU Labs here))***  --------------------------------------------------------------------------------------    OTHER LABORATORY:     IMAGING STUDIES:   CXR:     54y Female PMHx HTN, T2DM, asthma, depression with recent diagnosis of invasive ampullary adenocarcinoma of the CBD whom underwent an uneventful whipple 1/11/18. Patient has had an uptrending WBC since procedure, initially attributed to not restarting antibiotics for cholangitis, but presents today with abdominal tenderness/distention and tachycardia. Admitted to SICU for close hemodynamic monitoring.    Neuro:  - on fentanyl and versed, wean off   -give versed prn for agitation/discomfort, can start precedex infusion  -daily sedation vacation in AM    Resp:  -wean off vent, FiO2 to 30%, RR down to 20  -CPAP trial as tolerated if patient remains stable   -ABG q6h till stabilised and then decrease frequency    CV:  - Monitor SBP, wean off pressors  -IVF resuscitation as needed   - pending ECHO     GI:  -NPO/ngt  -elevated bilirubin, direct bilirubin elevated, normal liver enzymes, trend CMP q6h with lactates till stabilised   -ct ppi ppx till NPO      Heme:   -monitor H/H, s/p MTP: monitor for bleeding,  -lovenox vte ppx    Renal:  - Strict I&O, monitor lytes, replete as needed  - LR@100 m/hr resuscitation, transition to mIVF once stable and monitor for fluid shifts as pt is s/p MTP and large volume fluid resc     ID:   - ct meropenem for broadened coverage, no intra op cultures sent  -blood culture 1/18 f/u    Endo:  - diabetic, FS and low dose corrective regimen for hyperglycemia     Access: RIJ cordis, 2 PIV, 1 JOURDAN draining serosanguinous, ETT, Abthera VAC    Dispo: SICU for hemodynamic monitoring     --------------------------------------------------------------------------------------    Critical Care Diagnoses: SICU Daily Progress Note  =====================================================  Interval/Overnight Events:     No acute overnight events, patient remained on vaso overnight with low UOP.  Returning to OR today for possible closure vs abthera VAC change. Febrile to 100.6 overnight, cultures sent, UA+ nitrite, follow up urine cultures.     POD #          	SICU Day #    ***    HISTORY OF PRESENT ILLNESS:  NOEMI SOUSA is a 54y Female PMHx HTN, T2DM, asthma, depression with recent diagnosis of invasive ampullary adenocarcinoma of the CBD whom underwent an uneventful whipple 1/11/18. Patient has had an uptrending WBC since procedure, initially attributed to not restarting antibiotics for cholangitis, but presents today with abdominal tenderness/distention and tachycardia. Admitted to SICU for close hemodynamic monitoring.    PAST MEDICAL HISTORY:   Adenocarcinoma of gallbladder  Type 2 diabetes mellitus without complication, without long-term current use of insulin  Neuropathy  Arthritis  Mild intermittent asthma without complication  Gastroesophageal reflux disease without esophagitis  Essential hypertension    PAST SURGICAL HISTORY: No significant past surgical history      FAMILY HISTORY: No pertinent family history in first degree relatives    ALLERGIES: No Known Allergies      MEDICATIONS:   --------------------------------------------------------------------------------------  Neurologic Medications  fentaNYL   Infusion. 0.5 MICROgram(s)/kG/Hr IV Continuous <Continuous>  midazolam Infusion 0.02 mG/kG/Hr IV Continuous <Continuous>    Respiratory Medications    Cardiovascular Medications  norepinephrine Infusion 0.05 MICROgram(s)/kG/Min IV Continuous <Continuous>    Gastrointestinal Medications  lactated ringers. 1000 milliLiter(s) IV Continuous <Continuous>  pantoprazole  Injectable 40 milliGRAM(s) IV Push daily    Genitourinary Medications    Hematologic/Oncologic Medications  enoxaparin Injectable 40 milliGRAM(s) SubCutaneous daily    Antimicrobial/Immunologic Medications  meropenem  IVPB 1000 milliGRAM(s) IV Intermittent every 8 hours  meropenem  IVPB      vancomycin  IVPB      vancomycin  IVPB 1000 milliGRAM(s) IV Intermittent every 12 hours    Endocrine/Metabolic Medications  insulin lispro (HumaLOG) corrective regimen sliding scale   SubCutaneous every 6 hours  vasopressin Infusion 0.04 Unit(s)/Min IV Continuous <Continuous>    Topical/Other Medications  chlorhexidine 4% Liquid 1 Application(s) Topical <User Schedule>    --------------------------------------------------------------------------------------    VITAL SIGNS, INS/OUTS (last 24 hours):  --------------------------------------------------------------------------------------  ((Insert SICU Vitals/Is+Os here))***  --------------------------------------------------------------------------------------    EXAM  NEUROLOGY  RASS: 0  Exam: NAD, sedated on propofol and fentanyl     HEENT  Exam: Normocephalic, atraumatic, EOMI.      RESPIRATORY  Exam: Lungs clear to auscultation, Normal expansion/effort.   Mechanical Ventilation: Mode: AC/ CMV (Assist Control/ Continuous Mandatory Ventilation), RR (machine): 24, TV (machine): 400, FiO2: 50, PEEP: 5, MAP: 11.7, PIP: 23    CARDIOVASCULAR  Exam: S1, S2.  Regular rate and rhythm.       GI/NUTRITION  Exam: Abdomen soft, Non-tender, Non-distended.    Wound: abthera VAC in place, Reece drain in place with sanguinous drainage.     VASCULAR  Exam: Extremities warm, pink, well-perfused.     MUSCULOSKELETAL  Exam: All extremities moving spontaneously without limitations.     SKIN  Exam: Good skin turgor, no skin breakdown.   METABOLIC/FLUIDS/ELECTROLYTES  lactated ringers. 1000 milliLiter(s) IV Continuous <Continuous>      HEMATOLOGIC  [x] VTE Prophylaxis: enoxaparin Injectable 40 milliGRAM(s) SubCutaneous daily    Transfusions:	[] PRBC	[] Platelets		[] FFP	[] Cryoprecipitate    INFECTIOUS DISEASE  Antimicrobials/Immunologic Medications:  meropenem  IVPB 1000 milliGRAM(s) IV Intermittent every 8 hours  meropenem  IVPB      vancomycin  IVPB      vancomycin  IVPB 1000 milliGRAM(s) IV Intermittent every 12 hours    Day #      of     ***    Tubes/Lines/Drains  ***  [x] Peripheral IV  [] Central Venous Line     	[] R	[] L	[] IJ	[] Fem	[] SC	Date Placed:   [] Arterial Line		[] R	[] L	[] Fem	[] Rad	[] Ax	Date Placed:   [] PICC		[] Midline		[] Mediport  [] Urinary Catheter		Date Placed:   [x] Necessity of urinary, arterial, and venous catheters discussed    LABS  --------------------------------------------------------------------------------------  ((Insert SICU Labs here))***  --------------------------------------------------------------------------------------    OTHER LABORATORY:     IMAGING STUDIES:   CXR:     54y Female PMHx HTN, T2DM, asthma, depression with recent diagnosis of invasive ampullary adenocarcinoma of the CBD whom underwent an uneventful whipple 1/11/18. Patient has had an uptrending WBC since procedure, initially attributed to not restarting antibiotics for cholangitis, but presents today with abdominal tenderness/distention and tachycardia. Admitted to SICU for close hemodynamic monitoring.    Neuro:  - on fentanyl and versed, wean off   -give versed prn for agitation/discomfort, can start precedex infusion  -daily sedation vacation in AM    Resp:  -wean off vent, FiO2 to 30%, RR down to 20  -CPAP trial as tolerated if patient remains stable   -ABG q6h till stabilised and then decrease frequency    CV:  - Monitor SBP, wean off pressors  -IVF resuscitation as needed   - pending ECHO     GI:  -NPO/ngt  -elevated bilirubin, direct bilirubin elevated, normal liver enzymes, trend CMP q6h with lactates till stabilised   -ct ppi ppx till NPO      Heme:   -monitor H/H, s/p MTP: monitor for bleeding,  -lovenox vte ppx    Renal:  - Strict I&O, monitor lytes, replete as needed  - LR@100 m/hr resuscitation, transition to mIVF once stable and monitor for fluid shifts as pt is s/p MTP and large volume fluid resc     ID:   - ct meropenem for broadened coverage, no intra op cultures sent  -blood culture 1/18 f/u    Endo:  - diabetic, FS and low dose corrective regimen for hyperglycemia     Access: RIJ cordis, 2 PIV, 1 JOURDAN draining serosanguinous, ETT, Abthera VAC    Dispo: SICU for hemodynamic monitoring     --------------------------------------------------------------------------------------    Critical Care Diagnoses: SICU Daily Progress Note  =====================================================  Interval/Overnight Events:     No acute overnight events, patient remained on vaso overnight with low UOP.  Returning to OR today for possible closure vs abthera VAC change. Febrile to 100.6 overnight, cultures sent, UA+ nitrite, follow up urine cultures.     HISTORY OF PRESENT ILLNESS:  NOEMI SOUSA is a 54y Female PMHx HTN, T2DM, asthma, depression with recent diagnosis of invasive ampullary adenocarcinoma of the CBD whom underwent an uneventful whipple 1/11/18. Patient has had an uptrending WBC since procedure, initially attributed to not restarting antibiotics for cholangitis, but presents today with abdominal tenderness/distention and tachycardia. Admitted to SICU for close hemodynamic monitoring.    PAST MEDICAL HISTORY:   Adenocarcinoma of gallbladder  Type 2 diabetes mellitus without complication, without long-term current use of insulin  Neuropathy  Arthritis  Mild intermittent asthma without complication  Gastroesophageal reflux disease without esophagitis  Essential hypertension    PAST SURGICAL HISTORY: No significant past surgical history    FAMILY HISTORY: No pertinent family history in first degree relatives    ALLERGIES: No Known Allergies      MEDICATIONS:   --------------------------------------------------------------------------------------  Neurologic Medications  fentaNYL   Infusion. 0.5 MICROgram(s)/kG/Hr IV Continuous <Continuous>  midazolam Infusion 0.02 mG/kG/Hr IV Continuous <Continuous>    Respiratory Medications    Cardiovascular Medications  norepinephrine Infusion 0.05 MICROgram(s)/kG/Min IV Continuous <Continuous>    Gastrointestinal Medications  lactated ringers. 1000 milliLiter(s) IV Continuous <Continuous>  pantoprazole  Injectable 40 milliGRAM(s) IV Push daily    Genitourinary Medications    Hematologic/Oncologic Medications  enoxaparin Injectable 40 milliGRAM(s) SubCutaneous daily    Antimicrobial/Immunologic Medications  meropenem  IVPB 1000 milliGRAM(s) IV Intermittent every 8 hours  meropenem  IVPB      vancomycin  IVPB      vancomycin  IVPB 1000 milliGRAM(s) IV Intermittent every 12 hours    Endocrine/Metabolic Medications  insulin lispro (HumaLOG) corrective regimen sliding scale   SubCutaneous every 6 hours  vasopressin Infusion 0.04 Unit(s)/Min IV Continuous <Continuous>    Topical/Other Medications  chlorhexidine 4% Liquid 1 Application(s) Topical <User Schedule>    --------------------------------------------------------------------------------------    EXAM  NEUROLOGY  RASS: -1  Exam: NAD, sedated on versed gtt    HEENT  Exam: Normocephalic, atraumatic, EOMI.      RESPIRATORY  Exam: Lungs clear to auscultation, Normal expansion/effort.   Mechanical Ventilation: Mode: AC/ CMV (Assist Control/ Continuous Mandatory Ventilation), RR (machine): 24, TV (machine): 400, FiO2: 50, PEEP: 5, MAP: 11.7, PIP: 23    CARDIOVASCULAR  Exam: S1, S2.  Regular rate and rhythm.       GI/NUTRITION  Exam: Abdomen soft, Non-tender, mild distention    Wound: abthera VAC in place, Reece drain in place with sanguinous drainage.     VASCULAR  Exam: Extremities warm, pink, well-perfused.     SKIN  Exam: Good skin turgor, no skin breakdown.   METABOLIC/FLUIDS/ELECTROLYTES  lactated ringers. 1000 milliLiter(s) IV Continuous <Continuous>      HEMATOLOGIC  [x] VTE Prophylaxis: enoxaparin Injectable 40 milliGRAM(s) SubCutaneous daily    Transfusions:	[] PRBC	[] Platelets		[] FFP	[] Cryoprecipitate    INFECTIOUS DISEASE  Antimicrobials/Immunologic Medications:  meropenem  IVPB 1000 milliGRAM(s) IV Intermittent every 8 hours  meropenem  IVPB      vancomycin  IVPB      vancomycin  IVPB 1000 milliGRAM(s) IV Intermittent every 12 hours        Tubes/Lines/Drains   [x] Peripheral IV  [X] Central Venous Line     	[X] R	[] L	[X] IJ	[] Fem	[] SC	Date Placed:   [X] Arterial Line		[] R	[X] L	[] Fem	[X] Rad	[] Ax	Date Placed:   [] PICC		[] Midline		[] Mediport  [] Urinary Catheter		Date Placed:   [x] Necessity of urinary, arterial, and venous catheters discussed      54y Female PMHx HTN, T2DM, asthma, depression with recent diagnosis of invasive ampullary adenocarcinoma of the CBD whom underwent an uneventful whipple 1/11/18. Patient has had an uptrending WBC since procedure, initially attributed to not restarting antibiotics for cholangitis, but presents today with abdominal tenderness/distention and tachycardia. Admitted to SICU for close hemodynamic monitoring, plan for RTOR today for abthera exchange v. abdominal wall closure    Neuro:  - sedated on versed gtt  - pain control with fentanyl gtt   -daily sedation vacation    Resp:  - wean off vent, FiO2 to 30%, RR down to 20  - CPAP trial as tolerated if patient remains stable    CV:  - Requiring Levo/Vaso gtt to stabilized BP, will wean as tolerated  - IVF resuscitation as needed   - pending ECHO     GI:  -NPO  -elevated bilirubin, direct bilirubin elevated, normal liver enzymes, trend CMP q6h with lactates till stabilised   -ct ppi ppx till NPO      Heme:   -monitor H/H, s/p MTP: monitor for bleeding,  -lovenox vte ppx    Renal:  - Strict I&O, monitor lytes, replete as needed, mild hyponatremia this AM changed LR to NS  - NS@100 m/hr  - will continue to monitor UO    ID:   - febrile overnight to 100.6, UA positive, will f/u urine cultures  - ct meropenem/vancomycin for broadened coverage, no intra op cultures sent  - blood culture 1/18 f/u    Endo:  - diabetic, FS and low dose corrective regimen for hyperglycemia     Access: RIJ cordis, 2 PIV, 1 JOURDAN draining serosanguinous, ETT, Abthera VAC    Dispo: SICU for hemodynamic monitoring, ROT today

## 2019-01-21 NOTE — PROGRESS NOTE ADULT - SUBJECTIVE AND OBJECTIVE BOX
Surgery Progress Note    S: Patient seen and examined. Patient was febrile o/n to 100.6 with full work up sent. Patient scheduled to return to OR today for possible abdominal closure vs abthera VAC change.     O:  Vital Signs Last 24 Hrs  T(C): 36.7 (21 Jan 2019 08:00), Max: 38.2 (20 Jan 2019 16:00)  T(F): 98.1 (21 Jan 2019 08:00), Max: 100.8 (20 Jan 2019 16:00)  HR: 60 (21 Jan 2019 08:00) (56 - 84)  BP: 104/50 (21 Jan 2019 02:26) (104/50 - 104/50)  BP(mean): --  RR: 7 (21 Jan 2019 08:00) (0 - 23)  SpO2: 96% (21 Jan 2019 08:00) (94% - 98%)    I&O's Detail    20 Jan 2019 07:01  -  21 Jan 2019 07:00  --------------------------------------------------------  IN:    fentaNYL Infusion.: 46 mL    IV PiggyBack: 1200 mL    lactated ringers.: 2025 mL    midazolam Infusion: 9.2 mL    norepinephrine Infusion: 13.1 mL    sodium chloride 0.9%.: 150 mL    vasopressin Infusion: 52.8 mL  Total IN: 3496.1 mL    OUT:    Bulb: 18 mL    Indwelling Catheter - Urethral: 205 mL    Nasoenteral Tube: 150 mL    VAC (Vacuum Assisted Closure) System: 950 mL  Total OUT: 1323 mL    Total NET: 2173.1 mL      21 Jan 2019 07:01  -  21 Jan 2019 10:40  --------------------------------------------------------  IN:    sodium chloride 0.9%.: 100 mL    vasopressin Infusion: 2.4 mL  Total IN: 102.4 mL    OUT:    Indwelling Catheter - Urethral: 30 mL  Total OUT: 30 mL    Total NET: 72.4 mL          MEDICATIONS  (STANDING):  chlorhexidine 4% Liquid 1 Application(s) Topical <User Schedule>  enoxaparin Injectable 40 milliGRAM(s) SubCutaneous daily  insulin lispro (HumaLOG) corrective regimen sliding scale   SubCutaneous every 6 hours  meropenem  IVPB 1000 milliGRAM(s) IV Intermittent every 8 hours  meropenem  IVPB      norepinephrine Infusion 0.05 MICROgram(s)/kG/Min (2.691 mL/Hr) IV Continuous <Continuous>  pantoprazole  Injectable 40 milliGRAM(s) IV Push daily  sodium chloride 0.9%. 1000 milliLiter(s) (100 mL/Hr) IV Continuous <Continuous>  vancomycin  IVPB      vancomycin  IVPB 1000 milliGRAM(s) IV Intermittent every 12 hours  vasopressin Infusion 0.04 Unit(s)/Min (2.4 mL/Hr) IV Continuous <Continuous>    MEDICATIONS  (PRN):                            9.4    22.17 )-----------( 134      ( 21 Jan 2019 02:20 )             28.1       01-21    130<L>  |  97<L>  |  15  ----------------------------<  174<H>  4.2   |  22  |  0.63    Ca    7.7<L>      21 Jan 2019 08:15  Phos  2.2     01-21  Mg     2.0     01-21    TPro  4.6<L>  /  Alb  2.0<L>  /  TBili  4.7<H>  /  DBili  x   /  AST  37<H>  /  ALT  14  /  AlkPhos  129<H>  01-21      Physical Exam:  Gen: Laying in bed, NAD, intubated, NGT in place  Resp: Unlabored breathing  Abd: soft, mildly distended, midline wound vac in place, JOURDAN with ss output, no rebound or guarding  Ext: WWP  Skin: No rashes

## 2019-01-21 NOTE — PROGRESS NOTE ADULT - ASSESSMENT
54y Female PMHx HTN, T2DM, asthma, depression with recent diagnosis of invasive ampullary adenocarcinoma of the CBD whom underwent an uneventful whipple 1/11/18. Patient has had an uptrending WBC since procedure, initially attributed to not restarting antibiotics for cholangitis, but experienced abdominal tenderness/distention and tachycardia yesterday and was transferred to SICU for close hemodynamic monitoring. Now s/p Celiotomy, evacuation of hematoma, cessation of gastroduodenal artery bleeding, and placement of Abthera vac 1/18. still requiring pressors for BP management.    PLAN:  -OR today for abdominal wall closure vs placement of new abthera vac  -Pain control as needed  -Monitor vs, uop  -Serial CBCs, H/H  -cont meropenem and vanc  -npo/ivf  -appreciate SICU care    D Team Surgery  [id number]

## 2019-01-21 NOTE — BRIEF OPERATIVE NOTE - OPERATION/FINDINGS
abdomen washed out. Reece drain placed at the pancreaticojejunostomy. Fascia bridged with Stratice mesh. JOURDAN placed above the fascia. Skin closed with staples.

## 2019-01-21 NOTE — BRIEF OPERATIVE NOTE - PRE-OP DX
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

## 2019-01-21 NOTE — BRIEF OPERATIVE NOTE - POST-OP DX
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

## 2019-01-21 NOTE — PROGRESS NOTE ADULT - ATTENDING COMMENTS
Seen and examined, chart and note reviewed, case discussed with SICU team    s/p abdominal washout and fascial closure this am    Respiratory failure  a.  Continue vent support  b.  Obtain ABG  c.  Monitor plateau pressures    Sepsis secondary to cholangitis  a.  Continue meropenem at this time  b.  DC vancomycin  c.  Continue IV NSS resuscitation    Spent 40 minutes in critical care    Acute posthemorrhage anemia  a.  Stab;e

## 2019-01-22 LAB
-  COAGULASE NEGATIVE STAPHYLOCOCCUS: SIGNIFICANT CHANGE UP
ALBUMIN SERPL ELPH-MCNC: 2.1 G/DL — LOW (ref 3.3–5)
ALBUMIN SERPL ELPH-MCNC: 2.2 G/DL — LOW (ref 3.3–5)
ALBUMIN SERPL ELPH-MCNC: 2.3 G/DL — LOW (ref 3.3–5)
ALP SERPL-CCNC: 164 U/L — HIGH (ref 40–120)
ALP SERPL-CCNC: 194 U/L — HIGH (ref 40–120)
ALP SERPL-CCNC: 199 U/L — HIGH (ref 40–120)
ALT FLD-CCNC: 17 U/L — SIGNIFICANT CHANGE UP (ref 4–33)
ALT FLD-CCNC: 20 U/L — SIGNIFICANT CHANGE UP (ref 4–33)
ALT FLD-CCNC: 23 U/L — SIGNIFICANT CHANGE UP (ref 4–33)
ANION GAP SERPL CALC-SCNC: 11 MMO/L — SIGNIFICANT CHANGE UP (ref 7–14)
ANION GAP SERPL CALC-SCNC: 12 MMO/L — SIGNIFICANT CHANGE UP (ref 7–14)
ANION GAP SERPL CALC-SCNC: 13 MMO/L — SIGNIFICANT CHANGE UP (ref 7–14)
ANISOCYTOSIS BLD QL: SIGNIFICANT CHANGE UP
APTT BLD: 31.8 SEC — SIGNIFICANT CHANGE UP (ref 27.5–36.3)
APTT BLD: 36.5 SEC — HIGH (ref 27.5–36.3)
AST SERPL-CCNC: 66 U/L — HIGH (ref 4–32)
AST SERPL-CCNC: 70 U/L — HIGH (ref 4–32)
AST SERPL-CCNC: 73 U/L — HIGH (ref 4–32)
BACTERIA BLD CULT: SIGNIFICANT CHANGE UP
BACTERIA UR CULT: SIGNIFICANT CHANGE UP
BASE EXCESS BLDA CALC-SCNC: -0.1 MMOL/L — SIGNIFICANT CHANGE UP
BASE EXCESS BLDA CALC-SCNC: 0.4 MMOL/L — SIGNIFICANT CHANGE UP
BASE EXCESS BLDA CALC-SCNC: 0.5 MMOL/L — SIGNIFICANT CHANGE UP
BASE EXCESS BLDA CALC-SCNC: 1.5 MMOL/L — SIGNIFICANT CHANGE UP
BASOPHILS # BLD AUTO: 0.11 K/UL — SIGNIFICANT CHANGE UP (ref 0–0.2)
BASOPHILS NFR BLD AUTO: 0.4 % — SIGNIFICANT CHANGE UP (ref 0–2)
BASOPHILS NFR SPEC: 0.9 % — SIGNIFICANT CHANGE UP (ref 0–2)
BILIRUB DIRECT SERPL-MCNC: 3.1 MG/DL — HIGH (ref 0.1–0.2)
BILIRUB SERPL-MCNC: 3.9 MG/DL — HIGH (ref 0.2–1.2)
BILIRUB SERPL-MCNC: 4.4 MG/DL — HIGH (ref 0.2–1.2)
BILIRUB SERPL-MCNC: 4.7 MG/DL — HIGH (ref 0.2–1.2)
BLASTS # FLD: 0 % — SIGNIFICANT CHANGE UP (ref 0–0)
BUN SERPL-MCNC: 10 MG/DL — SIGNIFICANT CHANGE UP (ref 7–23)
BUN SERPL-MCNC: 11 MG/DL — SIGNIFICANT CHANGE UP (ref 7–23)
BUN SERPL-MCNC: 13 MG/DL — SIGNIFICANT CHANGE UP (ref 7–23)
CA-I BLD-SCNC: 1.02 MMOL/L — LOW (ref 1.03–1.23)
CA-I BLD-SCNC: 1.11 MMOL/L — SIGNIFICANT CHANGE UP (ref 1.03–1.23)
CA-I BLDA-SCNC: 1.13 MMOL/L — LOW (ref 1.15–1.29)
CALCIUM SERPL-MCNC: 7.6 MG/DL — LOW (ref 8.4–10.5)
CALCIUM SERPL-MCNC: 7.7 MG/DL — LOW (ref 8.4–10.5)
CALCIUM SERPL-MCNC: 7.8 MG/DL — LOW (ref 8.4–10.5)
CHLORIDE BLDA-SCNC: 103 MMOL/L — SIGNIFICANT CHANGE UP (ref 96–108)
CHLORIDE BLDA-SCNC: 105 MMOL/L — SIGNIFICANT CHANGE UP (ref 96–108)
CHLORIDE BLDA-SCNC: 109 MMOL/L — HIGH (ref 96–108)
CHLORIDE SERPL-SCNC: 100 MMOL/L — SIGNIFICANT CHANGE UP (ref 98–107)
CHLORIDE SERPL-SCNC: 100 MMOL/L — SIGNIFICANT CHANGE UP (ref 98–107)
CHLORIDE SERPL-SCNC: 102 MMOL/L — SIGNIFICANT CHANGE UP (ref 98–107)
CO2 SERPL-SCNC: 22 MMOL/L — SIGNIFICANT CHANGE UP (ref 22–31)
CO2 SERPL-SCNC: 22 MMOL/L — SIGNIFICANT CHANGE UP (ref 22–31)
CO2 SERPL-SCNC: 23 MMOL/L — SIGNIFICANT CHANGE UP (ref 22–31)
CREAT SERPL-MCNC: 0.45 MG/DL — LOW (ref 0.5–1.3)
CREAT SERPL-MCNC: 0.47 MG/DL — LOW (ref 0.5–1.3)
CREAT SERPL-MCNC: 0.51 MG/DL — SIGNIFICANT CHANGE UP (ref 0.5–1.3)
EOSINOPHIL # BLD AUTO: 0.57 K/UL — HIGH (ref 0–0.5)
EOSINOPHIL NFR BLD AUTO: 2.1 % — SIGNIFICANT CHANGE UP (ref 0–6)
EOSINOPHIL NFR FLD: 0.9 % — SIGNIFICANT CHANGE UP (ref 0–6)
GIANT PLATELETS BLD QL SMEAR: PRESENT — SIGNIFICANT CHANGE UP
GLUCOSE BLDA-MCNC: 105 MG/DL — HIGH (ref 70–99)
GLUCOSE BLDA-MCNC: 142 MG/DL — HIGH (ref 70–99)
GLUCOSE BLDA-MCNC: 143 MG/DL — HIGH (ref 70–99)
GLUCOSE BLDA-MCNC: 145 MG/DL — HIGH (ref 70–99)
GLUCOSE SERPL-MCNC: 104 MG/DL — HIGH (ref 70–99)
GLUCOSE SERPL-MCNC: 120 MG/DL — HIGH (ref 70–99)
GLUCOSE SERPL-MCNC: 139 MG/DL — HIGH (ref 70–99)
HCO3 BLDA-SCNC: 25 MMOL/L — SIGNIFICANT CHANGE UP (ref 22–26)
HCO3 BLDA-SCNC: 26 MMOL/L — SIGNIFICANT CHANGE UP (ref 22–26)
HCT VFR BLD CALC: 27.5 % — LOW (ref 34.5–45)
HCT VFR BLD CALC: 28.1 % — LOW (ref 34.5–45)
HCT VFR BLD CALC: 28.7 % — LOW (ref 34.5–45)
HCT VFR BLDA CALC: 27 % — LOW (ref 34.5–46.5)
HCT VFR BLDA CALC: 27.1 % — LOW (ref 34.5–46.5)
HCT VFR BLDA CALC: 28.4 % — LOW (ref 34.5–46.5)
HCT VFR BLDA CALC: 28.7 % — LOW (ref 34.5–46.5)
HGB BLD-MCNC: 9 G/DL — LOW (ref 11.5–15.5)
HGB BLD-MCNC: 9.3 G/DL — LOW (ref 11.5–15.5)
HGB BLD-MCNC: 9.5 G/DL — LOW (ref 11.5–15.5)
HGB BLDA-MCNC: 8.7 G/DL — LOW (ref 11.5–15.5)
HGB BLDA-MCNC: 8.7 G/DL — LOW (ref 11.5–15.5)
HGB BLDA-MCNC: 9.2 G/DL — LOW (ref 11.5–15.5)
HGB BLDA-MCNC: 9.3 G/DL — LOW (ref 11.5–15.5)
HYPOCHROMIA BLD QL: SLIGHT — SIGNIFICANT CHANGE UP
IMM GRANULOCYTES NFR BLD AUTO: 4.4 % — HIGH (ref 0–1.5)
INR BLD: 1.57 — HIGH (ref 0.88–1.17)
INR BLD: 1.67 — HIGH (ref 0.88–1.17)
LACTATE BLDA-SCNC: 2.4 MMOL/L — HIGH (ref 0.5–2)
LACTATE BLDA-SCNC: 3.2 MMOL/L — HIGH (ref 0.5–2)
LACTATE BLDA-SCNC: 3.8 MMOL/L — HIGH (ref 0.5–2)
LACTATE BLDA-SCNC: 4.1 MMOL/L — CRITICAL HIGH (ref 0.5–2)
LYMPHOCYTES # BLD AUTO: 13.6 % — SIGNIFICANT CHANGE UP (ref 13–44)
LYMPHOCYTES # BLD AUTO: 3.66 K/UL — HIGH (ref 1–3.3)
LYMPHOCYTES NFR SPEC AUTO: 5.2 % — LOW (ref 13–44)
MAGNESIUM SERPL-MCNC: 1.9 MG/DL — SIGNIFICANT CHANGE UP (ref 1.6–2.6)
MAGNESIUM SERPL-MCNC: 2 MG/DL — SIGNIFICANT CHANGE UP (ref 1.6–2.6)
MAGNESIUM SERPL-MCNC: 2 MG/DL — SIGNIFICANT CHANGE UP (ref 1.6–2.6)
MCHC RBC-ENTMCNC: 27 PG — SIGNIFICANT CHANGE UP (ref 27–34)
MCHC RBC-ENTMCNC: 27.9 PG — SIGNIFICANT CHANGE UP (ref 27–34)
MCHC RBC-ENTMCNC: 28 PG — SIGNIFICANT CHANGE UP (ref 27–34)
MCHC RBC-ENTMCNC: 32 % — SIGNIFICANT CHANGE UP (ref 32–36)
MCHC RBC-ENTMCNC: 33.1 % — SIGNIFICANT CHANGE UP (ref 32–36)
MCHC RBC-ENTMCNC: 33.8 % — SIGNIFICANT CHANGE UP (ref 32–36)
MCV RBC AUTO: 82.8 FL — SIGNIFICANT CHANGE UP (ref 80–100)
MCV RBC AUTO: 84.2 FL — SIGNIFICANT CHANGE UP (ref 80–100)
MCV RBC AUTO: 84.4 FL — SIGNIFICANT CHANGE UP (ref 80–100)
METAMYELOCYTES # FLD: 1.7 % — HIGH (ref 0–1)
MONOCYTES # BLD AUTO: 2.44 K/UL — HIGH (ref 0–0.9)
MONOCYTES NFR BLD AUTO: 9 % — SIGNIFICANT CHANGE UP (ref 2–14)
MONOCYTES NFR BLD: 3.5 % — SIGNIFICANT CHANGE UP (ref 2–9)
MYELOCYTES NFR BLD: 0 % — SIGNIFICANT CHANGE UP (ref 0–0)
NEUTROPHIL AB SER-ACNC: 86.1 % — HIGH (ref 43–77)
NEUTROPHILS # BLD AUTO: 19.02 K/UL — HIGH (ref 1.8–7.4)
NEUTROPHILS NFR BLD AUTO: 70.5 % — SIGNIFICANT CHANGE UP (ref 43–77)
NEUTS BAND # BLD: 1.7 % — SIGNIFICANT CHANGE UP (ref 0–6)
NRBC # FLD: 0.15 K/UL — LOW (ref 25–125)
NRBC # FLD: 0.16 K/UL — LOW (ref 25–125)
NRBC # FLD: 0.19 K/UL — LOW (ref 25–125)
OTHER - HEMATOLOGY %: 0 — SIGNIFICANT CHANGE UP
PCO2 BLDA: 36 MMHG — SIGNIFICANT CHANGE UP (ref 32–48)
PCO2 BLDA: 36 MMHG — SIGNIFICANT CHANGE UP (ref 32–48)
PCO2 BLDA: 37 MMHG — SIGNIFICANT CHANGE UP (ref 32–48)
PCO2 BLDA: 37 MMHG — SIGNIFICANT CHANGE UP (ref 32–48)
PH BLDA: 7.43 PH — SIGNIFICANT CHANGE UP (ref 7.35–7.45)
PH BLDA: 7.44 PH — SIGNIFICANT CHANGE UP (ref 7.35–7.45)
PH BLDA: 7.44 PH — SIGNIFICANT CHANGE UP (ref 7.35–7.45)
PH BLDA: 7.46 PH — HIGH (ref 7.35–7.45)
PHOSPHATE SERPL-MCNC: 2.1 MG/DL — LOW (ref 2.5–4.5)
PHOSPHATE SERPL-MCNC: 2.7 MG/DL — SIGNIFICANT CHANGE UP (ref 2.5–4.5)
PHOSPHATE SERPL-MCNC: 3 MG/DL — SIGNIFICANT CHANGE UP (ref 2.5–4.5)
PLATELET # BLD AUTO: 161 K/UL — SIGNIFICANT CHANGE UP (ref 150–400)
PLATELET # BLD AUTO: 184 K/UL — SIGNIFICANT CHANGE UP (ref 150–400)
PLATELET # BLD AUTO: 206 K/UL — SIGNIFICANT CHANGE UP (ref 150–400)
PLATELET COUNT - ESTIMATE: NORMAL — SIGNIFICANT CHANGE UP
PMV BLD: 10.8 FL — SIGNIFICANT CHANGE UP (ref 7–13)
PMV BLD: 11 FL — SIGNIFICANT CHANGE UP (ref 7–13)
PMV BLD: 11.2 FL — SIGNIFICANT CHANGE UP (ref 7–13)
PO2 BLDA: 104 MMHG — SIGNIFICANT CHANGE UP (ref 83–108)
PO2 BLDA: 62 MMHG — LOW (ref 83–108)
PO2 BLDA: 63 MMHG — LOW (ref 83–108)
PO2 BLDA: 72 MMHG — LOW (ref 83–108)
POLYCHROMASIA BLD QL SMEAR: SLIGHT — SIGNIFICANT CHANGE UP
POTASSIUM BLDA-SCNC: 3.6 MMOL/L — SIGNIFICANT CHANGE UP (ref 3.4–4.5)
POTASSIUM BLDA-SCNC: 3.6 MMOL/L — SIGNIFICANT CHANGE UP (ref 3.4–4.5)
POTASSIUM BLDA-SCNC: 3.7 MMOL/L — SIGNIFICANT CHANGE UP (ref 3.4–4.5)
POTASSIUM BLDA-SCNC: 3.9 MMOL/L — SIGNIFICANT CHANGE UP (ref 3.4–4.5)
POTASSIUM SERPL-MCNC: 4.1 MMOL/L — SIGNIFICANT CHANGE UP (ref 3.5–5.3)
POTASSIUM SERPL-MCNC: 4.3 MMOL/L — SIGNIFICANT CHANGE UP (ref 3.5–5.3)
POTASSIUM SERPL-MCNC: 4.4 MMOL/L — SIGNIFICANT CHANGE UP (ref 3.5–5.3)
POTASSIUM SERPL-SCNC: 4.1 MMOL/L — SIGNIFICANT CHANGE UP (ref 3.5–5.3)
POTASSIUM SERPL-SCNC: 4.3 MMOL/L — SIGNIFICANT CHANGE UP (ref 3.5–5.3)
POTASSIUM SERPL-SCNC: 4.4 MMOL/L — SIGNIFICANT CHANGE UP (ref 3.5–5.3)
PROMYELOCYTES # FLD: 0 % — SIGNIFICANT CHANGE UP (ref 0–0)
PROT SERPL-MCNC: 4.8 G/DL — LOW (ref 6–8.3)
PROT SERPL-MCNC: 5 G/DL — LOW (ref 6–8.3)
PROT SERPL-MCNC: 5.2 G/DL — LOW (ref 6–8.3)
PROTHROM AB SERPL-ACNC: 18.2 SEC — HIGH (ref 9.8–13.1)
PROTHROM AB SERPL-ACNC: 19.3 SEC — HIGH (ref 9.8–13.1)
RBC # BLD: 3.32 M/UL — LOW (ref 3.8–5.2)
RBC # BLD: 3.33 M/UL — LOW (ref 3.8–5.2)
RBC # BLD: 3.41 M/UL — LOW (ref 3.8–5.2)
RBC # FLD: 18.6 % — HIGH (ref 10.3–14.5)
RBC # FLD: 18.7 % — HIGH (ref 10.3–14.5)
RBC # FLD: 18.7 % — HIGH (ref 10.3–14.5)
SAO2 % BLDA: 92.9 % — LOW (ref 95–99)
SAO2 % BLDA: 93.1 % — LOW (ref 95–99)
SAO2 % BLDA: 96.1 % — SIGNIFICANT CHANGE UP (ref 95–99)
SAO2 % BLDA: 98.5 % — SIGNIFICANT CHANGE UP (ref 95–99)
SMUDGE CELLS # BLD: PRESENT — SIGNIFICANT CHANGE UP
SODIUM BLDA-SCNC: 128 MMOL/L — LOW (ref 136–146)
SODIUM BLDA-SCNC: 128 MMOL/L — LOW (ref 136–146)
SODIUM BLDA-SCNC: 129 MMOL/L — LOW (ref 136–146)
SODIUM BLDA-SCNC: 133 MMOL/L — LOW (ref 136–146)
SODIUM SERPL-SCNC: 135 MMOL/L — SIGNIFICANT CHANGE UP (ref 135–145)
SPECIMEN SOURCE: SIGNIFICANT CHANGE UP
SPECIMEN SOURCE: SIGNIFICANT CHANGE UP
VANCOMYCIN TROUGH SERPL-MCNC: 16.1 UG/ML — SIGNIFICANT CHANGE UP (ref 10–20)
VARIANT LYMPHS # BLD: 0 % — SIGNIFICANT CHANGE UP
WBC # BLD: 21.85 K/UL — HIGH (ref 3.8–10.5)
WBC # BLD: 26.94 K/UL — HIGH (ref 3.8–10.5)
WBC # BLD: 26.98 K/UL — HIGH (ref 3.8–10.5)
WBC # FLD AUTO: 21.85 K/UL — HIGH (ref 3.8–10.5)
WBC # FLD AUTO: 26.94 K/UL — HIGH (ref 3.8–10.5)
WBC # FLD AUTO: 26.98 K/UL — HIGH (ref 3.8–10.5)

## 2019-01-22 PROCEDURE — 71045 X-RAY EXAM CHEST 1 VIEW: CPT | Mod: 26

## 2019-01-22 PROCEDURE — 99291 CRITICAL CARE FIRST HOUR: CPT

## 2019-01-22 PROCEDURE — 99232 SBSQ HOSP IP/OBS MODERATE 35: CPT

## 2019-01-22 RX ORDER — HYDROMORPHONE HYDROCHLORIDE 2 MG/ML
0.5 INJECTION INTRAMUSCULAR; INTRAVENOUS; SUBCUTANEOUS ONCE
Qty: 0 | Refills: 0 | Status: DISCONTINUED | OUTPATIENT
Start: 2019-01-22 | End: 2019-01-22

## 2019-01-22 RX ORDER — CALCIUM GLUCONATE 100 MG/ML
1 VIAL (ML) INTRAVENOUS ONCE
Qty: 0 | Refills: 0 | Status: COMPLETED | OUTPATIENT
Start: 2019-01-22 | End: 2019-01-22

## 2019-01-22 RX ORDER — MICAFUNGIN SODIUM 100 MG/1
INJECTION, POWDER, LYOPHILIZED, FOR SOLUTION INTRAVENOUS
Qty: 0 | Refills: 0 | Status: DISCONTINUED | OUTPATIENT
Start: 2019-01-22 | End: 2019-01-30

## 2019-01-22 RX ORDER — DEXMEDETOMIDINE HYDROCHLORIDE IN 0.9% SODIUM CHLORIDE 4 UG/ML
0.5 INJECTION INTRAVENOUS
Qty: 200 | Refills: 0 | Status: DISCONTINUED | OUTPATIENT
Start: 2019-01-22 | End: 2019-01-24

## 2019-01-22 RX ORDER — HYDROMORPHONE HYDROCHLORIDE 2 MG/ML
1 INJECTION INTRAMUSCULAR; INTRAVENOUS; SUBCUTANEOUS EVERY 4 HOURS
Qty: 0 | Refills: 0 | Status: DISCONTINUED | OUTPATIENT
Start: 2019-01-22 | End: 2019-01-22

## 2019-01-22 RX ORDER — SODIUM CHLORIDE 9 MG/ML
1000 INJECTION, SOLUTION INTRAVENOUS ONCE
Qty: 0 | Refills: 0 | Status: COMPLETED | OUTPATIENT
Start: 2019-01-22 | End: 2019-01-22

## 2019-01-22 RX ORDER — FLUCONAZOLE 150 MG/1
TABLET ORAL
Qty: 0 | Refills: 0 | Status: DISCONTINUED | OUTPATIENT
Start: 2019-01-22 | End: 2019-01-22

## 2019-01-22 RX ORDER — HYDROMORPHONE HYDROCHLORIDE 2 MG/ML
0.5 INJECTION INTRAMUSCULAR; INTRAVENOUS; SUBCUTANEOUS EVERY 4 HOURS
Qty: 0 | Refills: 0 | Status: DISCONTINUED | OUTPATIENT
Start: 2019-01-22 | End: 2019-01-23

## 2019-01-22 RX ORDER — NOREPINEPHRINE BITARTRATE/D5W 8 MG/250ML
0.05 PLASTIC BAG, INJECTION (ML) INTRAVENOUS
Qty: 16 | Refills: 0 | Status: DISCONTINUED | OUTPATIENT
Start: 2019-01-22 | End: 2019-01-23

## 2019-01-22 RX ORDER — SODIUM CHLORIDE 9 MG/ML
1000 INJECTION, SOLUTION INTRAVENOUS
Qty: 0 | Refills: 0 | Status: DISCONTINUED | OUTPATIENT
Start: 2019-01-22 | End: 2019-01-23

## 2019-01-22 RX ORDER — FLUCONAZOLE 150 MG/1
200 TABLET ORAL ONCE
Qty: 0 | Refills: 0 | Status: COMPLETED | OUTPATIENT
Start: 2019-01-22 | End: 2019-01-22

## 2019-01-22 RX ORDER — ACETAMINOPHEN 500 MG
1000 TABLET ORAL ONCE
Qty: 0 | Refills: 0 | Status: COMPLETED | OUTPATIENT
Start: 2019-01-22 | End: 2019-01-22

## 2019-01-22 RX ORDER — CHLORHEXIDINE GLUCONATE 213 G/1000ML
15 SOLUTION TOPICAL
Qty: 0 | Refills: 0 | Status: DISCONTINUED | OUTPATIENT
Start: 2019-01-22 | End: 2019-01-30

## 2019-01-22 RX ORDER — MICAFUNGIN SODIUM 100 MG/1
100 INJECTION, POWDER, LYOPHILIZED, FOR SOLUTION INTRAVENOUS EVERY 24 HOURS
Qty: 0 | Refills: 0 | Status: DISCONTINUED | OUTPATIENT
Start: 2019-01-23 | End: 2019-01-30

## 2019-01-22 RX ORDER — MORPHINE SULFATE 50 MG/1
1 CAPSULE, EXTENDED RELEASE ORAL
Qty: 100 | Refills: 0 | Status: DISCONTINUED | OUTPATIENT
Start: 2019-01-22 | End: 2019-01-22

## 2019-01-22 RX ORDER — MICAFUNGIN SODIUM 100 MG/1
100 INJECTION, POWDER, LYOPHILIZED, FOR SOLUTION INTRAVENOUS ONCE
Qty: 0 | Refills: 0 | Status: COMPLETED | OUTPATIENT
Start: 2019-01-22 | End: 2019-01-22

## 2019-01-22 RX ADMIN — HYDROMORPHONE HYDROCHLORIDE 0.5 MILLIGRAM(S): 2 INJECTION INTRAMUSCULAR; INTRAVENOUS; SUBCUTANEOUS at 18:41

## 2019-01-22 RX ADMIN — HYDROMORPHONE HYDROCHLORIDE 0.5 MILLIGRAM(S): 2 INJECTION INTRAMUSCULAR; INTRAVENOUS; SUBCUTANEOUS at 14:35

## 2019-01-22 RX ADMIN — Medication 400 MILLIGRAM(S): at 04:45

## 2019-01-22 RX ADMIN — Medication 400 MILLIGRAM(S): at 20:14

## 2019-01-22 RX ADMIN — HYDROMORPHONE HYDROCHLORIDE 0.5 MILLIGRAM(S): 2 INJECTION INTRAMUSCULAR; INTRAVENOUS; SUBCUTANEOUS at 05:37

## 2019-01-22 RX ADMIN — DEXMEDETOMIDINE HYDROCHLORIDE IN 0.9% SODIUM CHLORIDE 7.17 MICROGRAM(S)/KG/HR: 4 INJECTION INTRAVENOUS at 21:15

## 2019-01-22 RX ADMIN — HYDROMORPHONE HYDROCHLORIDE 0.5 MILLIGRAM(S): 2 INJECTION INTRAMUSCULAR; INTRAVENOUS; SUBCUTANEOUS at 05:51

## 2019-01-22 RX ADMIN — Medication 200 GRAM(S): at 03:41

## 2019-01-22 RX ADMIN — Medication 63.75 MILLIMOLE(S): at 05:38

## 2019-01-22 RX ADMIN — MEROPENEM 100 MILLIGRAM(S): 1 INJECTION INTRAVENOUS at 21:24

## 2019-01-22 RX ADMIN — MEROPENEM 100 MILLIGRAM(S): 1 INJECTION INTRAVENOUS at 05:37

## 2019-01-22 RX ADMIN — Medication 1000 MILLIGRAM(S): at 05:50

## 2019-01-22 RX ADMIN — CHLORHEXIDINE GLUCONATE 1 APPLICATION(S): 213 SOLUTION TOPICAL at 09:35

## 2019-01-22 RX ADMIN — SODIUM CHLORIDE 100 MILLILITER(S): 9 INJECTION INTRAMUSCULAR; INTRAVENOUS; SUBCUTANEOUS at 09:35

## 2019-01-22 RX ADMIN — PANTOPRAZOLE SODIUM 40 MILLIGRAM(S): 20 TABLET, DELAYED RELEASE ORAL at 11:46

## 2019-01-22 RX ADMIN — MICAFUNGIN SODIUM 105 MILLIGRAM(S): 100 INJECTION, POWDER, LYOPHILIZED, FOR SOLUTION INTRAVENOUS at 22:27

## 2019-01-22 RX ADMIN — SODIUM CHLORIDE 100 MILLILITER(S): 9 INJECTION, SOLUTION INTRAVENOUS at 11:47

## 2019-01-22 RX ADMIN — Medication 1 MILLIGRAM(S): at 21:00

## 2019-01-22 RX ADMIN — Medication 250 MILLIGRAM(S): at 05:37

## 2019-01-22 RX ADMIN — CHLORHEXIDINE GLUCONATE 15 MILLILITER(S): 213 SOLUTION TOPICAL at 18:38

## 2019-01-22 RX ADMIN — MEROPENEM 100 MILLIGRAM(S): 1 INJECTION INTRAVENOUS at 13:56

## 2019-01-22 RX ADMIN — SODIUM CHLORIDE 2000 MILLILITER(S): 9 INJECTION, SOLUTION INTRAVENOUS at 21:45

## 2019-01-22 RX ADMIN — HYDROMORPHONE HYDROCHLORIDE 0.5 MILLIGRAM(S): 2 INJECTION INTRAMUSCULAR; INTRAVENOUS; SUBCUTANEOUS at 15:17

## 2019-01-22 RX ADMIN — Medication 250 MILLIGRAM(S): at 17:08

## 2019-01-22 RX ADMIN — ENOXAPARIN SODIUM 40 MILLIGRAM(S): 100 INJECTION SUBCUTANEOUS at 11:46

## 2019-01-22 RX ADMIN — FLUCONAZOLE 100 MILLIGRAM(S): 150 TABLET ORAL at 18:09

## 2019-01-22 RX ADMIN — Medication 1000 MILLIGRAM(S): at 20:17

## 2019-01-22 RX ADMIN — SODIUM CHLORIDE 100 MILLILITER(S): 9 INJECTION, SOLUTION INTRAVENOUS at 19:18

## 2019-01-22 RX ADMIN — Medication 1 DROP(S): at 05:37

## 2019-01-22 RX ADMIN — Medication 1 DROP(S): at 17:07

## 2019-01-22 RX ADMIN — Medication 0.5 MILLIGRAM(S): at 17:07

## 2019-01-22 NOTE — PROGRESS NOTE ADULT - ATTENDING COMMENTS
Pt with ampullary ca, s/p ade, critically ill in SICU. Path pending. Spoke to dtr about recovery and outpt f/u at [facility] for chemotherapy

## 2019-01-22 NOTE — PROGRESS NOTE ADULT - SUBJECTIVE AND OBJECTIVE BOX
SICU Daily Progress Note  =====================================================  Interval/Overnight Events:     Patient RTOR 1/21/19 for closure of abdomen with stratice mesh. Reece drain placed at the pancreaticojejunostomy and left lower JOURDAN drain in SQ. Post-op patient remained off vasopressors. FENA calculated to be pre-renal. 500CC albumin bolus given, urine output improved. Overnight midline incision dressing saturated with serosanguinous drainage and re-dressed.       HISTORY OF PRESENT ILLNESS:  NOEMI SOUSA is a 54y Female PMHx HTN, T2DM, asthma, depression with recent diagnosis of invasive ampullary adenocarcinoma of the CBD whom underwent an uneventful whipple 1/11/18. Patient has had an uptrending WBC since procedure, initially attributed to not restarting antibiotics for cholangitis, but presents today with abdominal tenderness/distention and tachycardia. Admitted to SICU for close hemodynamic monitoring.      MEDICATIONS:   --------------------------------------------------------------------------------------  Neurologic Medications    Respiratory Medications    Cardiovascular Medications    Gastrointestinal Medications  pantoprazole  Injectable 40 milliGRAM(s) IV Push daily  sodium chloride 0.9%. 1000 milliLiter(s) IV Continuous <Continuous>    Genitourinary Medications    Hematologic/Oncologic Medications  enoxaparin Injectable 40 milliGRAM(s) SubCutaneous daily    Antimicrobial/Immunologic Medications  meropenem  IVPB 1000 milliGRAM(s) IV Intermittent every 8 hours  meropenem  IVPB      vancomycin  IVPB      vancomycin  IVPB 1000 milliGRAM(s) IV Intermittent every 12 hours    Endocrine/Metabolic Medications  insulin lispro (HumaLOG) corrective regimen sliding scale   SubCutaneous every 6 hours    Topical/Other Medications  chlorhexidine 4% Liquid 1 Application(s) Topical <User Schedule>    --------------------------------------------------------------------------------------    VITAL SIGNS, INS/OUTS (last 24 hours):  --------------------------------------------------------------------------------------  ((Insert SICU Vitals/Is+Os here))***  --------------------------------------------------------------------------------------    EXAM  NEUROLOGY  RASS:   -1  Exam: not arousal off sedating medications    HEENT  Exam: Normocephalic, atraumatic    RESPIRATORY  Exam: Lungs clear to auscultation, Normal expansion/effort.   Mechanical Ventilation: Mode: AC/ CMV (Assist Control/ Continuous Mandatory Ventilation), RR (machine): 14, TV (machine): 400, FiO2: 40, PEEP: 5, MAP: 10, PIP: 27    CARDIOVASCULAR  Exam: S1, S2.  Regular rate and rhythm.       GI/NUTRITION  Exam: Abdomen soft, mildly distended, Reece and JOURDAN drain with serosanguinous drainage. dressing c/d/i     VASCULAR  Exam: Extremities warm, pink, well-perfused.       SKIN  Exam: Good skin turgor, no skin breakdown.     METABOLIC/FLUIDS/ELECTROLYTES  sodium chloride 0.9%. 1000 milliLiter(s) IV Continuous <Continuous>      HEMATOLOGIC  [x] VTE Prophylaxis: enoxaparin Injectable 40 milliGRAM(s) SubCutaneous daily    Transfusions:	[] PRBC	[] Platelets		[] FFP	[] Cryoprecipitate    INFECTIOUS DISEASE  Antimicrobials/Immunologic Medications:  meropenem  IVPB 1000 milliGRAM(s) IV Intermittent every 8 hours  meropenem  IVPB      vancomycin  IVPB      vancomycin  IVPB 1000 milliGRAM(s) IV Intermittent every 12 hours    Day #      of     ***    Tubes/Lines/Drains  ***  [x] Peripheral IV  [] Central Venous Line     	[] R	[] L	[] IJ	[] Fem	[] SC	Date Placed:   [] Arterial Line		[] R	[] L	[] Fem	[] Rad	[] Ax	Date Placed:   [] PICC		[] Midline		[] Mediport  [] Urinary Catheter		Date Placed:   [x] Necessity of urinary, arterial, and venous catheters discussed    LABS  --------------------------------------------------------------------------------------  ((Insert SICU Labs here))***  --------------------------------------------------------------------------------------    OTHER LABORATORY:     IMAGING STUDIES:   CXR:     54y Female PMHx HTN, T2DM, asthma, depression with recent diagnosis of invasive ampullary adenocarcinoma of the CBD whom underwent an uneventful whipple 1/11/18. Patient has had an uptrending WBC since procedure, initially attributed to not restarting antibiotics for cholangitis, but presents today with abdominal tenderness/distention and tachycardia. Admitted to SICU for close hemodynamic monitoring, plan for RTOR today for abthera exchange v. abdominal wall closure    Neuro:  - sedated on versed gtt  - pain control with fentanyl gtt   -daily sedation vacation    Resp:  - wean off vent, FiO2 to 30%, RR down to 20  - CPAP trial as tolerated if patient remains stable    CV:  - Requiring Levo/Vaso gtt to stabilized BP, will wean as tolerated  - IVF resuscitation as needed   - pending ECHO     GI:  -NPO  -elevated bilirubin, direct bilirubin elevated, normal liver enzymes, trend CMP q6h with lactates till stabilised   -ct ppi ppx till NPO      Heme:   -monitor H/H, s/p MTP: monitor for bleeding,  -lovenox vte ppx    Renal:  - Strict I&O, monitor lytes, replete as needed, mild hyponatremia this AM changed LR to NS  - NS@100 m/hr  - will continue to monitor UO    ID:   - febrile overnight to 100.6, UA positive, will f/u urine cultures  - ct meropenem/vancomycin for broadened coverage, no intra op cultures sent  - blood culture 1/18 f/u    Endo:  - diabetic, FS and low dose corrective regimen for hyperglycemia     Access: RIJ cordis, 2 PIV, 1 JOURDAN draining serosanguinous, ETT, Abthera VAC    Dispo: SICU for hemodynamic monitoring, ROT today        --------------------------------------------------------------------------------------    Critical Care Diagnoses: SICU Daily Progress Note  =====================================================  Interval/Overnight Events:     Patient RTOR 1/21/19 for closure of abdomen with stratice mesh. Reece drain placed at the pancreaticojejunostomy and left lower JOURDAN drain in SQ. Post-op patient remained off vasopressors. FENA calculated to be pre-renal. 500CC albumin bolus given, urine output improved. Overnight midline incision dressing saturated with serosanguinous drainage and re-dressed.       HISTORY OF PRESENT ILLNESS:  NOEMI SOUSA is a 54y Female PMHx HTN, T2DM, asthma, depression with recent diagnosis of invasive ampullary adenocarcinoma of the CBD whom underwent an uneventful whipple 1/11/18. Patient has had an uptrending WBC since procedure, initially attributed to not restarting antibiotics for cholangitis, but presents today with abdominal tenderness/distention and tachycardia. Admitted to SICU for close hemodynamic monitoring.      MEDICATIONS:   --------------------------------------------------------------------------------------  Neurologic Medications    Respiratory Medications    Cardiovascular Medications    Gastrointestinal Medications  pantoprazole  Injectable 40 milliGRAM(s) IV Push daily  sodium chloride 0.9%. 1000 milliLiter(s) IV Continuous <Continuous>    Genitourinary Medications    Hematologic/Oncologic Medications  enoxaparin Injectable 40 milliGRAM(s) SubCutaneous daily    Antimicrobial/Immunologic Medications  meropenem  IVPB 1000 milliGRAM(s) IV Intermittent every 8 hours  meropenem  IVPB      vancomycin  IVPB      vancomycin  IVPB 1000 milliGRAM(s) IV Intermittent every 12 hours    Endocrine/Metabolic Medications  insulin lispro (HumaLOG) corrective regimen sliding scale   SubCutaneous every 6 hours    Topical/Other Medications  chlorhexidine 4% Liquid 1 Application(s) Topical <User Schedule>    --------------------------------------------------------------------------------------    VITAL SIGNS, INS/OUTS (last 24 hours):  --------------------------------------------------------------------------------------  ((Insert SICU Vitals/Is+Os here))***  --------------------------------------------------------------------------------------    EXAM  NEUROLOGY  RASS:   -1  Exam: not arousal off sedating medications    HEENT  Exam: Normocephalic, atraumatic    RESPIRATORY  Exam: Lungs clear to auscultation, Normal expansion/effort.   Mechanical Ventilation: Mode: AC/ CMV (Assist Control/ Continuous Mandatory Ventilation), RR (machine): 14, TV (machine): 400, FiO2: 40, PEEP: 5, MAP: 10, PIP: 27    CARDIOVASCULAR  Exam: S1, S2.  Regular rate and rhythm.       GI/NUTRITION  Exam: Abdomen soft, mildly distended, Reece and JOURDAN drain with serosanguinous drainage. dressing c/d/i     VASCULAR  Exam: Extremities warm, pink, well-perfused.       SKIN  Exam: Good skin turgor, no skin breakdown.     METABOLIC/FLUIDS/ELECTROLYTES  sodium chloride 0.9%. 1000 milliLiter(s) IV Continuous <Continuous>      HEMATOLOGIC  [x] VTE Prophylaxis: enoxaparin Injectable 40 milliGRAM(s) SubCutaneous daily    Transfusions:	[] PRBC	[] Platelets		[] FFP	[] Cryoprecipitate    INFECTIOUS DISEASE  Antimicrobials/Immunologic Medications:  meropenem  IVPB 1000 milliGRAM(s) IV Intermittent every 8 hours  meropenem  IVPB      vancomycin  IVPB      vancomycin  IVPB 1000 milliGRAM(s) IV Intermittent every 12 hours    Day #      of     ***    Tubes/Lines/Drains  ***  [x] Peripheral IV  [] Central Venous Line     	[] R	[] L	[] IJ	[] Fem	[] SC	Date Placed:   [] Arterial Line		[] R	[] L	[] Fem	[] Rad	[] Ax	Date Placed:   [] PICC		[] Midline		[] Mediport  [] Urinary Catheter		Date Placed:   [x] Necessity of urinary, arterial, and venous catheters discussed    LABS  --------------------------------------------------------------------------------------  ((Insert SICU Labs here))***  --------------------------------------------------------------------------------------    OTHER LABORATORY:     IMAGING STUDIES:   CXR:     54y Female PMHx HTN, T2DM, asthma, depression with recent diagnosis of invasive ampullary adenocarcinoma of the CBD whom underwent an uneventful whipple 1/11/18. Patient has had an uptrending WBC since procedure, initially attributed to not restarting antibiotics for cholangitis, but presents today with abdominal tenderness/distention and tachycardia. Admitted to SICU for close hemodynamic monitoring, plan for RTOR today for abthera exchange v. abdominal wall closure    Neuro:  - keep off sedating medication  -monitor mental status     Resp:  - wean off vent, FiO2 to 30%, RR down to 20  - CPAP trial as tolerated if patient remains stable    CV:  - Requiring Levo/Vaso gtt to stabilized BP, will wean as tolerated  - IVF resuscitation as needed   - pending ECHO     GI:  -NPO  -elevated bilirubin, direct bilirubin elevated, normal liver enzymes, trend CMP q6h with lactates till stabilised   -ct ppi ppx till NPO      Heme:   -monitor H/H, s/p MTP: monitor for bleeding,  -lovenox vte ppx    Renal:  - Strict I&O, monitor lytes, replete as needed, mild hyponatremia this AM changed LR to NS  - NS@100 m/hr  - will continue to monitor UO    ID:   - febrile overnight to 100.6, UA positive, will f/u urine cultures  - ct meropenem/vancomycin for broadened coverage, no intra op cultures sent  - blood culture 1/18 f/u    Endo:  - diabetic, FS and low dose corrective regimen for hyperglycemia     Access: RIJ cordis, 2 PIV, 1 JOURDAN draining serosanguinous, ETT, Abthera VAC    Dispo: SICU for hemodynamic monitoring, ROT today        --------------------------------------------------------------------------------------    Critical Care Diagnoses: SICU Daily Progress Note  =====================================================  Interval/Overnight Events:     Patient RTOR 1/21/19 for closure of abdomen with stratice mesh. Reece drain placed at the pancreaticojejunostomy and left lower JOURDAN drain in SQ. Post-op patient remained off vasopressors. FENA calculated to be pre-renal. 500CC albumin bolus given, urine output improved. Overnight midline incision dressing saturated with serosanguinous drainage and re-dressed. Patient's mental status overnight largely unchanged. Grimaced occasionally to pain.       HISTORY OF PRESENT ILLNESS:  NOEMI SOUSA is a 54y Female PMHx HTN, T2DM, asthma, depression with recent diagnosis of invasive ampullary adenocarcinoma of the CBD whom underwent an uneventful whipple 1/11/18. Patient has had an uptrending WBC since procedure, initially attributed to not restarting antibiotics for cholangitis, but presents today with abdominal tenderness/distention and tachycardia. Admitted to SICU for close hemodynamic monitoring.      MEDICATIONS:   --------------------------------------------------------------------------------------  Neurologic Medications    Respiratory Medications    Cardiovascular Medications    Gastrointestinal Medications  pantoprazole  Injectable 40 milliGRAM(s) IV Push daily  sodium chloride 0.9%. 1000 milliLiter(s) IV Continuous <Continuous>    Genitourinary Medications    Hematologic/Oncologic Medications  enoxaparin Injectable 40 milliGRAM(s) SubCutaneous daily    Antimicrobial/Immunologic Medications  meropenem  IVPB 1000 milliGRAM(s) IV Intermittent every 8 hours  meropenem  IVPB      vancomycin  IVPB      vancomycin  IVPB 1000 milliGRAM(s) IV Intermittent every 12 hours    Endocrine/Metabolic Medications  insulin lispro (HumaLOG) corrective regimen sliding scale   SubCutaneous every 6 hours    Topical/Other Medications  chlorhexidine 4% Liquid 1 Application(s) Topical <User Schedule>    --------------------------------------------------------------------------------------    VITAL SIGNS, INS/OUTS (last 24 hours):  --------------------------------------------------------------------------------------  ((Insert SICU Vitals/Is+Os here))***  --------------------------------------------------------------------------------------    EXAM  NEUROLOGY  RASS:   -1  Exam: not arousal off sedating medications    HEENT  Exam: Normocephalic, atraumatic    RESPIRATORY  Exam: Lungs clear to auscultation, Normal expansion/effort.   Mechanical Ventilation: Mode: AC/ CMV (Assist Control/ Continuous Mandatory Ventilation), RR (machine): 14, TV (machine): 400, FiO2: 40, PEEP: 5, MAP: 10, PIP: 27    CARDIOVASCULAR  Exam: S1, S2.  Regular rate and rhythm.       GI/NUTRITION  Exam: Abdomen soft, mildly distended, Reece and JOURDAN drain with serosanguinous drainage. dressing c/d/i     VASCULAR  Exam: Extremities warm, pink, well-perfused.       SKIN  Exam: Good skin turgor, no skin breakdown.     METABOLIC/FLUIDS/ELECTROLYTES  sodium chloride 0.9%. 1000 milliLiter(s) IV Continuous <Continuous>      HEMATOLOGIC  [x] VTE Prophylaxis: enoxaparin Injectable 40 milliGRAM(s) SubCutaneous daily    Transfusions:	[] PRBC	[] Platelets		[] FFP	[] Cryoprecipitate    INFECTIOUS DISEASE  Antimicrobials/Immunologic Medications:  meropenem  IVPB 1000 milliGRAM(s) IV Intermittent every 8 hours  meropenem  IVPB      vancomycin  IVPB      vancomycin  IVPB 1000 milliGRAM(s) IV Intermittent every 12 hours    Day #      of     ***    Tubes/Lines/Drains  ***  [x] Peripheral IV  [] Central Venous Line     	[] R	[] L	[] IJ	[] Fem	[] SC	Date Placed:   [] Arterial Line		[] R	[] L	[] Fem	[] Rad	[] Ax	Date Placed:   [] PICC		[] Midline		[] Mediport  [] Urinary Catheter		Date Placed:   [x] Necessity of urinary, arterial, and venous catheters discussed    LABS  --------------------------------------------------------------------------------------  ((Insert SICU Labs here))***  --------------------------------------------------------------------------------------    OTHER LABORATORY:     IMAGING STUDIES:   CXR:     54y Female PMHx HTN, T2DM, asthma, depression with recent diagnosis of invasive ampullary adenocarcinoma of the CBD whom underwent an uneventful whipple 1/11/18. Patient has had an uptrending WBC since procedure, initially attributed to not restarting antibiotics for cholangitis, but presents today with abdominal tenderness/distention and tachycardia. Admitted to SICU for close hemodynamic monitoring, plan for RTOR today for abthera exchange v. abdominal wall closure    Neuro:  - keep off sedating medication  -monitor mental status     Resp:  - wean off vent, FiO2 to 40%, RR down to 20  - CPAP trial as tolerated if patient remains stable    CV:  -monitor SBP, now off vasopressors   - IVF resuscitation as needed   - pending ECHO     GI:  -NPO  -elevated bilirubin, direct bilirubin elevated, normal liver enzymes, trend CMP q6h with lactates till stabilised   -ct ppi ppx till NPO      Heme:   -monitor H/H, s/p MTP: monitor for bleeding,  -lovenox vte ppx    Renal:  - Strict I&O, monitor lytes, replete as needed, mild hyponatremia this AM changed LR to NS  - NS@100 m/hr  - will continue to monitor UO    ID:   - febrile overnight to 100.6, UA positive, will f/u urine cultures  - ct meropenem/vancomycin for broadened coverage, no intra op cultures sent  - blood culture 1/18 f/u- +coag negative staph  -f/u BCX from 1/19 and 1/20    Endo:  - diabetic, FS and low dose corrective regimen for hyperglycemia     Access: RIJ cordis, 2 PIV, 1 JOURDAN draining serosanguinous, ETT    Dispo: SICU for hemodynamic monitoring, ROT today        --------------------------------------------------------------------------------------    Critical Care Diagnoses: SICU Daily Progress Note  =====================================================  Interval/Overnight Events:     Patient RTOR 1/21/19 for closure of abdomen with stratice mesh. Reece drain placed at the pancreaticojejunostomy and left lower JOURDAN drain in SQ. Post-op patient remained off vasopressors. FENA calculated to be pre-renal. 500CC albumin bolus given, urine output improved. Overnight midline incision dressing saturated with serosanguinous drainage and re-dressed. Patient's mental status overnight largely unchanged. Grimaced occasionally to pain.       HISTORY OF PRESENT ILLNESS:  NOEMI SOUSA is a 54y Female PMHx HTN, T2DM, asthma, depression with recent diagnosis of invasive ampullary adenocarcinoma of the CBD whom underwent an uneventful whipple 1/11/18. Patient has had an uptrending WBC since procedure, initially attributed to not restarting antibiotics for cholangitis, but presents today with abdominal tenderness/distention and tachycardia. Admitted to SICU for close hemodynamic monitoring.      MEDICATIONS:   --------------------------------------------------------------------------------------  Neurologic Medications    Respiratory Medications    Cardiovascular Medications    Gastrointestinal Medications  pantoprazole  Injectable 40 milliGRAM(s) IV Push daily  sodium chloride 0.9%. 1000 milliLiter(s) IV Continuous <Continuous>    Genitourinary Medications    Hematologic/Oncologic Medications  enoxaparin Injectable 40 milliGRAM(s) SubCutaneous daily    Antimicrobial/Immunologic Medications  meropenem  IVPB 1000 milliGRAM(s) IV Intermittent every 8 hours  meropenem  IVPB      vancomycin  IVPB      vancomycin  IVPB 1000 milliGRAM(s) IV Intermittent every 12 hours    Endocrine/Metabolic Medications  insulin lispro (HumaLOG) corrective regimen sliding scale   SubCutaneous every 6 hours    Topical/Other Medications  chlorhexidine 4% Liquid 1 Application(s) Topical <User Schedule>    --------------------------------------------------------------------------------------    VITAL SIGNS, INS/OUTS (last 24 hours):  --------------------------------------------------------------------------------------  ((Insert SICU Vitals/Is+Os here))***  --------------------------------------------------------------------------------------    EXAM  NEUROLOGY  RASS:   -1  Exam: not arousal off sedating medications    HEENT  Exam: Normocephalic, atraumatic    RESPIRATORY  Exam: Lungs clear to auscultation, Normal expansion/effort.   Mechanical Ventilation: Mode: AC/ CMV (Assist Control/ Continuous Mandatory Ventilation), RR (machine): 14, TV (machine): 400, FiO2: 40, PEEP: 5, MAP: 10, PIP: 27    CARDIOVASCULAR  Exam: S1, S2.  Regular rate and rhythm.       GI/NUTRITION  Exam: Abdomen soft, mildly distended, Reece and JOURDAN drain with serosanguinous drainage. dressing c/d/i     VASCULAR  Exam: Extremities warm, pink, well-perfused.       SKIN  Exam: Good skin turgor, no skin breakdown.     METABOLIC/FLUIDS/ELECTROLYTES  sodium chloride 0.9%. 1000 milliLiter(s) IV Continuous <Continuous>      HEMATOLOGIC  [x] VTE Prophylaxis: enoxaparin Injectable 40 milliGRAM(s) SubCutaneous daily    Transfusions:	[] PRBC	[] Platelets		[] FFP	[] Cryoprecipitate    INFECTIOUS DISEASE  Antimicrobials/Immunologic Medications:  meropenem  IVPB 1000 milliGRAM(s) IV Intermittent every 8 hours  meropenem  IVPB      vancomycin  IVPB      vancomycin  IVPB 1000 milliGRAM(s) IV Intermittent every 12 hours    Day #      of     ***    Tubes/Lines/Drains  ***  [x] Peripheral IV  [] Central Venous Line     	[] R	[] L	[] IJ	[] Fem	[] SC	Date Placed:   [] Arterial Line		[] R	[] L	[] Fem	[] Rad	[] Ax	Date Placed:   [] PICC		[] Midline		[] Mediport  [] Urinary Catheter		Date Placed:   [x] Necessity of urinary, arterial, and venous catheters discussed    LABS  --------------------------------------------------------------------------------------  ((Insert SICU Labs here))***  --------------------------------------------------------------------------------------    OTHER LABORATORY:     IMAGING STUDIES:   CXR:     54y Female PMHx HTN, T2DM, asthma, depression with recent diagnosis of invasive ampullary adenocarcinoma of the CBD whom underwent an uneventful whipple 1/11/18. Patient has had an uptrending WBC since procedure, initially attributed to not restarting antibiotics for cholangitis, but presents today with abdominal tenderness/distention and tachycardia. Admitted to SICU for close hemodynamic monitoring, plan for RTOR today for abthera exchange v. abdominal wall closure    Neuro:  - keep off sedating medication  -monitor mental status     Resp:  - wean off vent, FiO2 to 40%, RR down to 20  - CPAP trial as tolerated if patient remains stable    CV:  -monitor SBP, now off vasopressors   - IVF resuscitation as needed   - pending ECHO     GI:  -NPO  -elevated bilirubin, direct bilirubin elevated, normal liver enzymes, trend CMP q6h with lactates till stabilised   -ct ppi ppx till NPO      Heme:   -monitor H/H, s/p MTP: monitor for bleeding,  -lovenox vte ppx    Renal:  - Strict I&O, monitor lytes, replete as needed, mild hyponatremia this AM changed LR to NS  - NS@100 m/hr  - urine output improved overnight     ID:   - febrile overnight to 100.6, UA positive, will f/u urine cultures  - ct meropenem/vancomycin for broadened coverage, no intra op cultures sent  - blood culture 1/18 f/u- +coag negative staph  -f/u BCX from 1/19 and 1/20    Endo:  - diabetic, FS and low dose corrective regimen for hyperglycemia     Access: RIJ cordis, 2 PIV, 1 JOURDAN draining serosanguinous, ETT    Dispo: SICU for hemodynamic monitoring, ROT today        --------------------------------------------------------------------------------------    Critical Care Diagnoses: SICU Daily Progress Note  =====================================================  Interval/Overnight Events:     Patient RTOR 1/21/19 for closure of abdomen with stratice mesh. Reece drain placed at the pancreaticojejunostomy and left lower JOURDAN drain in SQ. Post-op patient remained off vasopressors. FENA calculated to be pre-renal. 500CC albumin bolus given, urine output improved. Overnight midline incision dressing saturated with serosanguinous drainage and re-dressed. Patient's mental status overnight largely unchanged. Grimaced occasionally to pain. Febrile to 100.6. Cultures from 1/21 negative to date.       HISTORY OF PRESENT ILLNESS:  NOEMI SOUSA is a 54y Female PMHx HTN, T2DM, asthma, depression with recent diagnosis of invasive ampullary adenocarcinoma of the CBD whom underwent an uneventful whipple 1/11/18. Patient has had an uptrending WBC since procedure, initially attributed to not restarting antibiotics for cholangitis, but presents today with abdominal tenderness/distention and tachycardia. Admitted to SICU for close hemodynamic monitoring.      MEDICATIONS:   --------------------------------------------------------------------------------------  Neurologic Medications    Respiratory Medications    Cardiovascular Medications    Gastrointestinal Medications  pantoprazole  Injectable 40 milliGRAM(s) IV Push daily  sodium chloride 0.9%. 1000 milliLiter(s) IV Continuous <Continuous>    Genitourinary Medications    Hematologic/Oncologic Medications  enoxaparin Injectable 40 milliGRAM(s) SubCutaneous daily    Antimicrobial/Immunologic Medications  meropenem  IVPB 1000 milliGRAM(s) IV Intermittent every 8 hours  meropenem  IVPB      vancomycin  IVPB      vancomycin  IVPB 1000 milliGRAM(s) IV Intermittent every 12 hours    Endocrine/Metabolic Medications  insulin lispro (HumaLOG) corrective regimen sliding scale   SubCutaneous every 6 hours    Topical/Other Medications  chlorhexidine 4% Liquid 1 Application(s) Topical <User Schedule>    --------------------------------------------------------------------------------------    VITAL SIGNS, INS/OUTS (last 24 hours):  --------------------------------------------------------------------------------------  ((Insert SICU Vitals/Is+Os here))***  --------------------------------------------------------------------------------------    EXAM  NEUROLOGY  RASS:   -1  Exam: not arousal off sedating medications    HEENT  Exam: Normocephalic, atraumatic    RESPIRATORY  Exam: Lungs clear to auscultation, Normal expansion/effort.   Mechanical Ventilation: Mode: AC/ CMV (Assist Control/ Continuous Mandatory Ventilation), RR (machine): 14, TV (machine): 400, FiO2: 40, PEEP: 5, MAP: 10, PIP: 27    CARDIOVASCULAR  Exam: S1, S2.  Regular rate and rhythm.       GI/NUTRITION  Exam: Abdomen soft, mildly distended, Reece and JOURDAN drain with serosanguinous drainage. dressing c/d/i     VASCULAR  Exam: Extremities warm, pink, well-perfused.       SKIN  Exam: Good skin turgor, no skin breakdown.     METABOLIC/FLUIDS/ELECTROLYTES  sodium chloride 0.9%. 1000 milliLiter(s) IV Continuous <Continuous>      HEMATOLOGIC  [x] VTE Prophylaxis: enoxaparin Injectable 40 milliGRAM(s) SubCutaneous daily    Transfusions:	[] PRBC	[] Platelets		[] FFP	[] Cryoprecipitate    INFECTIOUS DISEASE  Antimicrobials/Immunologic Medications:  meropenem  IVPB 1000 milliGRAM(s) IV Intermittent every 8 hours  meropenem  IVPB      vancomycin  IVPB      vancomycin  IVPB 1000 milliGRAM(s) IV Intermittent every 12 hours    Day #      of     ***    Tubes/Lines/Drains  ***  [x] Peripheral IV  [] Central Venous Line     	[] R	[] L	[] IJ	[] Fem	[] SC	Date Placed:   [] Arterial Line		[] R	[] L	[] Fem	[] Rad	[] Ax	Date Placed:   [] PICC		[] Midline		[] Mediport  [] Urinary Catheter		Date Placed:   [x] Necessity of urinary, arterial, and venous catheters discussed    LABS  --------------------------------------------------------------------------------------  ((Insert SICU Labs here))***  --------------------------------------------------------------------------------------    OTHER LABORATORY:     IMAGING STUDIES:   CXR:     54y Female PMHx HTN, T2DM, asthma, depression with recent diagnosis of invasive ampullary adenocarcinoma of the CBD whom underwent an uneventful whipple 1/11/18. Patient has had an uptrending WBC since procedure, initially attributed to not restarting antibiotics for cholangitis, but presents today with abdominal tenderness/distention and tachycardia. Admitted to SICU for close hemodynamic monitoring, plan for RTOR today for abthera exchange v. abdominal wall closure    Neuro:  - keep off sedating medication  -monitor mental status     Resp:  - wean off vent, FiO2 to 40%, RR down to 20  - CPAP trial as tolerated if patient remains stable    CV:  -monitor SBP, now off vasopressors   - IVF resuscitation as needed   - pending ECHO     GI:  -NPO  -elevated bilirubin, direct bilirubin elevated, normal liver enzymes, trend CMP q6h with lactates till stabilised   -ct ppi ppx till NPO      Heme:   -monitor H/H, s/p MTP: monitor for bleeding,  -lovenox vte ppx    Renal:  - Strict I&O, monitor lytes, replete as needed, mild hyponatremia this AM changed LR to NS  - NS@100 m/hr  - urine output improved overnight     ID:   - febrile overnight to 100.6, UA positive, will f/u urine cultures  - ct meropenem/vancomycin for broadened coverage, no intra op cultures sent  - blood culture 1/18 f/u- +coag negative staph  -f/u BCX from 1/19 and 1/20    Endo:  - diabetic, FS and low dose corrective regimen for hyperglycemia     Access: RIJ cordis, 2 PIV, 1 JOURDAN draining serosanguinous, ETT    Dispo: SICU for hemodynamic monitoring, ROT today        --------------------------------------------------------------------------------------    Critical Care Diagnoses: SICU Daily Progress Note  =====================================================  Interval/Overnight Events:  No significant overnight events. Patient RTOR 19 for closure of abdomen with stratice mesh. Reece drain placed at the pancreaticojejunostomy and left lower JOURDAN drain in SQ. Post-op patient remained off vasopressors. FENA calculated to be pre-renal. 500CC albumin bolus given, urine output improved. Overnight midline incision dressing saturated with serosanguinous drainage and re-dressed. Patient's mental status overnight largely unchanged. Grimaced occasionally to pain. Febrile to 100.6. Cultures from  negative to date.       HISTORY OF PRESENT ILLNESS:  NOEMI SOUSA is a 54y Female PMHx HTN, T2DM, asthma, depression with recent diagnosis of invasive ampullary adenocarcinoma of the CBD whom underwent an uneventful whipple 18. Patient has had an uptrending WBC since procedure, initially attributed to not restarting antibiotics for cholangitis, but presents today with abdominal tenderness/distention and tachycardia. Admitted to SICU for close hemodynamic monitoring.      MEDICATIONS:   --------------------------------------------------------------------------------------  Neurologic Medications    Respiratory Medications    Cardiovascular Medications    Gastrointestinal Medications  pantoprazole  Injectable 40 milliGRAM(s) IV Push daily  sodium chloride 0.9%. 1000 milliLiter(s) IV Continuous <Continuous>    Genitourinary Medications    Hematologic/Oncologic Medications  enoxaparin Injectable 40 milliGRAM(s) SubCutaneous daily    Antimicrobial/Immunologic Medications  meropenem  IVPB 1000 milliGRAM(s) IV Intermittent every 8 hours  meropenem  IVPB      vancomycin  IVPB      vancomycin  IVPB 1000 milliGRAM(s) IV Intermittent every 12 hours    Endocrine/Metabolic Medications  insulin lispro (HumaLOG) corrective regimen sliding scale   SubCutaneous every 6 hours    Topical/Other Medications  chlorhexidine 4% Liquid 1 Application(s) Topical <User Schedule>    Vital Signs Last 24 Hrs  T(C): 37.1 (2019 08:00), Max: 38.1 (2019 04:00)  T(F): 98.8 (2019 08:00), Max: 100.6 (2019 04:00)  HR: 83 (2019 11:13) (73 - 97)  BP: --  BP(mean): --  RR: 14 (2019 10:00) (0 - 16)  SpO2: 99% (2019 11:13) (95% - 100%)    I&O's Detail    2019 07:01  -  2019 07:00  --------------------------------------------------------  IN:    Albumin 5%  - 250 mL: 500 mL    IV PiggyBack: 1100 mL    sodium chloride 0.9%: 2000 mL    vasopressin Infusion: 2.4 mL  Total IN: 3602.4 mL    OUT:    Bulb: 140 mL    Bulb: 60 mL    Bulb: 130 mL    Indwelling Catheter - Urethral: 1040 mL    Nasoenteral Tube: 100 mL  Total OUT: 1470 mL    Total NET: 2132.4 mL      2019 07:01  -  2019 11:34  --------------------------------------------------------  IN:    sodium chloride 0.9%: 300 mL  Total IN: 300 mL    OUT:    Bulb: 10 mL    Bulb: 5 mL    Bulb: 50 mL    Indwelling Catheter - Urethral: 305 mL  Total OUT: 370 mL    Total NET: -70 mL                                9.3    21.85 )-----------( 161      ( 2019 01:00 )             27.5       -    135  |  102  |  13  ----------------------------<  104<H>  4.4   |  22  |  0.51    Ca    7.7<L>      2019 01:00  Phos  2.7       Mg     2.0         TPro  4.8<L>  /  Alb  2.3<L>  /  TBili  4.7<H>  /  DBili  x   /  AST  66<H>  /  ALT  17  /  AlkPhos  164<H>        CAPILLARY BLOOD GLUCOSE      POCT Blood Glucose.: 100 mg/dL (2019 05:49)  POCT Blood Glucose.: 97 mg/dL (2019 23:02)  POCT Blood Glucose.: 125 mg/dL (2019 18:10)  POCT Blood Glucose.: 150 mg/dL (2019 11:49)      LIVER FUNCTIONS - ( 2019 01:00 )  Alb: 2.3 g/dL / Pro: 4.8 g/dL / ALK PHOS: 164 u/L / ALT: 17 u/L / AST: 66 u/L / GGT: x             PT/INR - ( 2019 01:00 )   PT: 18.2 SEC;   INR: 1.57          PTT - ( 2019 01:00 )  PTT:36.5 SEC    Urinalysis Basic - ( 2019 04:00 )    Color: ORANGE / Appearance: TURBID / S.030 / pH: 6.0  Gluc: NEGATIVE / Ketone: SMALL  / Bili: MODERATE / Urobili: NORMAL   Blood: SMALL / Protein: 100 / Nitrite: POSITIVE   Leuk Esterase: NEGATIVE / RBC: 5-10 / WBC 5-10   Sq Epi: FEW / Non Sq Epi: x / Bacteria: MANY        EXAM  NEUROLOGY  RASS:   -1  Exam: not arousal off sedating medications    HEENT  Exam: Normocephalic, atraumatic    RESPIRATORY  Exam: Lungs clear to auscultation, Normal expansion/effort.   Mechanical Ventilation: Mode: AC/ CMV (Assist Control/ Continuous Mandatory Ventilation), RR (machine): 14, TV (machine): 400, FiO2: 40, PEEP: 5, MAP: 10, PIP: 27    CARDIOVASCULAR  Exam: S1, S2.  Regular rate and rhythm.       GI/NUTRITION  Exam: Abdomen soft, mildly distended, Reece and JOURDAN drain with serosanguinous drainage. dressing c/d/i     VASCULAR  Exam: Extremities warm, pink, well-perfused.       SKIN  Exam: Good skin turgor, no skin breakdown.     METABOLIC/FLUIDS/ELECTROLYTES  sodium chloride 0.9%. 1000 milliLiter(s) IV Continuous <Continuous>      HEMATOLOGIC  [x] VTE Prophylaxis: enoxaparin Injectable 40 milliGRAM(s) SubCutaneous daily    Transfusions:	[] PRBC	[] Platelets		[] FFP	[] Cryoprecipitate    INFECTIOUS DISEASE  Antimicrobials/Immunologic Medications:  meropenem  IVPB 1000 milliGRAM(s) IV Intermittent every 8 hours  meropenem  IVPB      vancomycin  IVPB      vancomycin  IVPB 1000 milliGRAM(s) IV Intermittent every 12 hours    Day # 4     of    Meropenem/ Vanc    Tubes/Lines/Drains    [x] Peripheral IV  [] Central Venous Line     	[] R	[] L	[] IJ	[] Fem	[] SC	Date Placed:   [] Arterial Line		[] R	[] L	[] Fem	[] Rad	[] Ax	Date Placed:   [] PICC		[] Midline		[] Mediport  [x] Urinary Catheter		Date Placed:   [x] Necessity of urinary, arterial, and venous catheters discussed  JOURDAN Drain x3       OTHER LABORATORY:     IMAGING STUDIES:   CXR: Bilateral opacities suggestive of ARDS SICU Daily Progress Note  =====================================================  Interval/Overnight Events:  No significant overnight events. Patient RTOR 19 for closure of abdomen with stratice mesh. Reece drain placed at the pancreaticojejunostomy and left lower JOURDAN drain in SQ. Post-op patient remained off vasopressors. FENA calculated to be pre-renal. 500CC albumin bolus given, urine output improved. Overnight midline incision dressing saturated with serosanguinous drainage and re-dressed. Patient's mental status overnight largely unchanged. Grimaced occasionally to pain. Febrile to 100.6. Cultures from  negative to date.       HISTORY OF PRESENT ILLNESS:  NOEMI SOUSA is a 54y Female PMHx HTN, T2DM, asthma, depression with recent diagnosis of invasive ampullary adenocarcinoma of the CBD whom underwent an uneventful whipple 18. Patient has had an uptrending WBC since procedure, initially attributed to not restarting antibiotics for cholangitis, but presents today with abdominal tenderness/distention and tachycardia. Admitted to SICU for close hemodynamic monitoring.      MEDICATIONS:   --------------------------------------------------------------------------------------  Neurologic Medications    Respiratory Medications    Cardiovascular Medications    Gastrointestinal Medications  pantoprazole  Injectable 40 milliGRAM(s) IV Push daily  sodium chloride 0.9%. 1000 milliLiter(s) IV Continuous <Continuous>    Genitourinary Medications    Hematologic/Oncologic Medications  enoxaparin Injectable 40 milliGRAM(s) SubCutaneous daily    Antimicrobial/Immunologic Medications  meropenem  IVPB 1000 milliGRAM(s) IV Intermittent every 8 hours  meropenem  IVPB      vancomycin  IVPB      vancomycin  IVPB 1000 milliGRAM(s) IV Intermittent every 12 hours    Endocrine/Metabolic Medications  insulin lispro (HumaLOG) corrective regimen sliding scale   SubCutaneous every 6 hours    Topical/Other Medications  chlorhexidine 4% Liquid 1 Application(s) Topical <User Schedule>    Vital Signs Last 24 Hrs  T(C): 37.1 (2019 08:00), Max: 38.1 (2019 04:00)  T(F): 98.8 (2019 08:00), Max: 100.6 (2019 04:00)  HR: 83 (2019 11:13) (73 - 97)  BP: --  BP(mean): --  RR: 14 (2019 10:00) (0 - 16)  SpO2: 99% (2019 11:13) (95% - 100%)    I&O's Detail    2019 07:01  -  2019 07:00  --------------------------------------------------------  IN:    Albumin 5%  - 250 mL: 500 mL    IV PiggyBack: 1100 mL    sodium chloride 0.9%: 2000 mL    vasopressin Infusion: 2.4 mL  Total IN: 3602.4 mL    OUT:    Bulb: 140 mL    Bulb: 60 mL    Bulb: 130 mL    Indwelling Catheter - Urethral: 1040 mL    Nasoenteral Tube: 100 mL  Total OUT: 1470 mL    Total NET: 2132.4 mL      2019 07:01  -  2019 11:34  --------------------------------------------------------  IN:    sodium chloride 0.9%: 300 mL  Total IN: 300 mL    OUT:    Bulb: 10 mL    Bulb: 5 mL    Bulb: 50 mL    Indwelling Catheter - Urethral: 305 mL  Total OUT: 370 mL    Total NET: -70 mL                                9.3    21.85 )-----------( 161      ( 2019 01:00 )             27.5       -    135  |  102  |  13  ----------------------------<  104<H>  4.4   |  22  |  0.51    Ca    7.7<L>      2019 01:00  Phos  2.7       Mg     2.0         TPro  4.8<L>  /  Alb  2.3<L>  /  TBili  4.7<H>  /  DBili  x   /  AST  66<H>  /  ALT  17  /  AlkPhos  164<H>        CAPILLARY BLOOD GLUCOSE      POCT Blood Glucose.: 100 mg/dL (2019 05:49)  POCT Blood Glucose.: 97 mg/dL (2019 23:02)  POCT Blood Glucose.: 125 mg/dL (2019 18:10)  POCT Blood Glucose.: 150 mg/dL (2019 11:49)      LIVER FUNCTIONS - ( 2019 01:00 )  Alb: 2.3 g/dL / Pro: 4.8 g/dL / ALK PHOS: 164 u/L / ALT: 17 u/L / AST: 66 u/L / GGT: x             PT/INR - ( 2019 01:00 )   PT: 18.2 SEC;   INR: 1.57          PTT - ( 2019 01:00 )  PTT:36.5 SEC    Urinalysis Basic - ( 2019 04:00 )    Color: ORANGE / Appearance: TURBID / S.030 / pH: 6.0  Gluc: NEGATIVE / Ketone: SMALL  / Bili: MODERATE / Urobili: NORMAL   Blood: SMALL / Protein: 100 / Nitrite: POSITIVE   Leuk Esterase: NEGATIVE / RBC: 5-10 / WBC 5-10   Sq Epi: FEW / Non Sq Epi: x / Bacteria: MANY        EXAM  NEUROLOGY  RASS:   -1  Exam: not arousal off sedating medications    HEENT  Exam: Normocephalic, atraumatic    RESPIRATORY  Exam: Lungs clear to auscultation, Normal expansion/effort.   Mechanical Ventilation: Mode: AC/ CMV (Assist Control/ Continuous Mandatory Ventilation), RR (machine): 14, TV (machine): 400, FiO2: 40, PEEP: 5, MAP: 10, PIP: 27    CARDIOVASCULAR  Exam: S1, S2.  Regular rate and rhythm.       GI/NUTRITION  Exam: Abdomen soft, mildly distended, Reece and JOURDAN drain with serosanguinous drainage. dressing c/d/i     VASCULAR  Exam: Extremities warm, pink, well-perfused.       SKIN  Exam: Good skin turgor, no skin breakdown.     METABOLIC/FLUIDS/ELECTROLYTES  sodium chloride 0.9%. 1000 milliLiter(s) IV Continuous <Continuous>      HEMATOLOGIC  [x] VTE Prophylaxis: enoxaparin Injectable 40 milliGRAM(s) SubCutaneous daily    Transfusions:	[] PRBC	[] Platelets		[] FFP	[] Cryoprecipitate    INFECTIOUS DISEASE  Antimicrobials/Immunologic Medications:  meropenem  IVPB 1000 milliGRAM(s) IV Intermittent every 8 hours  meropenem  IVPB      vancomycin  IVPB      vancomycin  IVPB 1000 milliGRAM(s) IV Intermittent every 12 hours    Day # 4     of    Meropenem/ Vanc    Tubes/Lines/Drains    [x] Peripheral IV  [] Central Venous Line     	[] R	[] L	[] IJ	[] Fem	[] SC	Date Placed:   [] Arterial Line		[] R	[] L	[] Fem	[] Rad	[] Ax	Date Placed:   [] PICC		[] Midline		[] Mediport  [x] Urinary Catheter		Date Placed:   [x] Necessity of urinary, arterial, and venous catheters discussed  JOURDAN Drain x3       IMAGING STUDIES:   CXR: Bilateral opacities suggestive of ARDS

## 2019-01-22 NOTE — PROGRESS NOTE ADULT - ASSESSMENT
54y Female PMHx HTN, T2DM, asthma, depression with recent diagnosis of invasive ampullary adenocarcinoma of the CBD whom underwent an uneventful whipple 1/11/18. Patient has had an uptrending WBC since procedure, initially attributed to not restarting antibiotics for cholangitis, but presents today with abdominal tenderness/distention and tachycardia. Admitted to SICU for close hemodynamic monitoring, plan for RTOR today for abthera exchange v. abdominal wall closure    Neuro:  - keep off sedating medication  -monitor mental status     Resp:  - wean off vent, FiO2 to 40%, RR down to 20  - CPAP trial as tolerated if patient remains stable    CV:  -monitor SBP, now off vasopressors   - IVF resuscitation as needed   - pending ECHO     GI:  -NPO  -elevated bilirubin, direct bilirubin elevated, normal liver enzymes, trend CMP q6h with lactates till stabilised   -ct ppi ppx at home dose      Heme:   -monitor H/H, s/p MTP: monitor for bleeding,  -lovenox vte ppx    Renal:  - Strict I&O, monitor lytes, replete as needed, mild hyponatremia this AM changed LR to NS  - NS@100 m/hr Switch to d5 1/2NS @ 100  - urine output improved overnight     ID:   - febrile overnight to 100.6, UA positive, will f/u urine cultures  - ct meropenem/vancomycin for broadened coverage, no intra op cultures sent  - blood culture 1/18 f/u- +coag negative staph  -f/u BCX from 1/19 and 1/20    Endo:  - diabetic, FS and low dose corrective regimen for hyperglycemia     Access: RIJ cordis, 2 PIV, 1 JOURDAN draining serosanguinous, ETT    Dispo: SICU for hemodynamic monitoring, ROT today        --------------------------------------------------------------------------------------    Critical Care Diagnoses: 54y Female PMHx HTN, T2DM, asthma, depression with recent diagnosis of invasive ampullary adenocarcinoma of the CBD whom underwent an uneventful whipple 1/11/18. Patient has had an uptrending WBC since procedure, initially attributed to not restarting antibiotics for cholangitis, but presents today with abdominal tenderness/distention and tachycardia. Admitted to SICU for close hemodynamic monitoring, plan for RTOR today for abthera exchange v. abdominal wall closure    Neuro:  - keep off sedating medication  -monitor mental status     Resp:  - wean off vent, FiO2 to 40%, RR down to 20  - CPAP trial as tolerated if patient remains stable    CV:  -monitor SBP, now off vasopressors   - IVF resuscitation as needed   - pending ECHO     GI:  -NPO  -elevated bilirubin, direct bilirubin elevated, normal liver enzymes, trend CMP q6h with lactates till stabilised   -ct ppi ppx at home dose      Heme:   -monitor H/H, s/p MTP: monitor for bleeding,  -lovenox vte ppx    Renal:  - Strict I&O, monitor lytes, replete as needed, mild hyponatremia this AM changed LR to NS  - NS@100 m/hr Switch to d5 1/2NS @ 100  - urine output improved overnight     ID:   - febrile overnight to 100.6, UA positive, will f/u urine cultures  - ct meropenem/vancomycin for broadened coverage, no intra op cultures sent- Will continue Abx for 4 more days and reassess clinically  - blood culture 1/18 f/u- +coag negative staph  -f/u BCX from 1/19 and 1/20    Endo:  - diabetic, FS and low dose corrective regimen for hyperglycemia     Access: RIJ cordis, 2 PIV, 1 JOURDAN draining serosanguinous, ETT    Dispo: SICU for hemodynamic monitoring, ROT today        --------------------------------------------------------------------------------------    Critical Care Diagnoses: 54y Female PMHx HTN, T2DM, asthma, depression with recent diagnosis of invasive ampullary adenocarcinoma of the CBD whom underwent an uneventful whipple 1/11/18. Patient has had an uptrending WBC since procedure, initially attributed to not restarting antibiotics for cholangitis, but presents today with abdominal tenderness/distention and tachycardia. Admitted to SICU for close hemodynamic monitoring, plan for RTOR today for abthera exchange v. abdominal wall closure    Neuro:  - keep off sedating medication  -monitor mental status     Resp:  - wean off vent, FiO2 to 40%, RR down to 20  -Decrease sedation so that pt. becomes more awake  - CPAP trial as tolerated if patient remains stable then consider extubation  -Flow track if extubate    CV:  -monitor SBP, now off vasopressors   - IVF resuscitation as needed   - pending ECHO     GI:  -NPO  -elevated bilirubin, direct bilirubin elevated, normal liver enzymes, trend CMP q6h with lactates till stabilised   -ct ppi ppx at home dose      Heme:   -monitor H/H, s/p MTP: monitor for bleeding,  -lovenox vte ppx    Renal:  - Strict I&O, monitor lytes, replete as needed, mild hyponatremia this AM changed LR to NS  - NS@100 m/hr Switch to d5 1/2NS @ 100  - urine output improved overnight     ID:   - febrile overnight to 100.6, UA positive, will f/u urine cultures  - ct meropenem/vancomycin for broadened coverage, no intra op cultures sent- Will continue Abx for 4 more days and reassess clinically  - blood culture 1/18 f/u- +coag negative staph  -f/u BCX from 1/19 and 1/20  -Will continue Abx for 4 days (1/26) and reassess clinically    Endo:  - diabetic, FS and low dose corrective regimen for hyperglycemia     Access: RIJ cordis, 2 PIV, 1 JOURDAN draining serosanguinous, ETT    Dispo: SICU for hemodynamic monitoring, ROT today

## 2019-01-22 NOTE — PROGRESS NOTE ADULT - SUBJECTIVE AND OBJECTIVE BOX
POST ANESTHESIA EVALUATION    54y Female POSTOP DAY 1 S/P     MENTAL STATUS: Patient participation [  ] Awake     [  ] Arousable     [  ] Sedated    AIRWAY PATENCY: [  ] Satisfactory  [  ] Other:     Vital Signs Last 24 Hrs  T(C): 37.1 (22 Jan 2019 08:00), Max: 38.1 (22 Jan 2019 04:00)  T(F): 98.8 (22 Jan 2019 08:00), Max: 100.6 (22 Jan 2019 04:00)  HR: 82 (22 Jan 2019 10:00) (73 - 97)  BP: --  BP(mean): --  RR: 14 (22 Jan 2019 10:00) (0 - 16)  SpO2: 100% (22 Jan 2019 10:00) (95% - 100%)  I&O's Summary    21 Jan 2019 07:01  -  22 Jan 2019 07:00  --------------------------------------------------------  IN: 3602.4 mL / OUT: 1470 mL / NET: 2132.4 mL    22 Jan 2019 07:01  -  22 Jan 2019 10:31  --------------------------------------------------------  IN: 300 mL / OUT: 370 mL / NET: -70 mL          NAUSEA/ VOMITTING:  [X  ] NONE  [  ] CONTROLLED [  ] OTHER     PAIN: [ X ] CONTROLLED WITH CURRENT REGIMEN  [  ] OTHER    [ X ] NO APPARENT ANESTHESIA COMPLICATIONS      Comments:

## 2019-01-22 NOTE — PROGRESS NOTE ADULT - ASSESSMENT
54y Female PMHx HTN, T2DM, asthma, depression with recent diagnosis of invasive ampullary adenocarcinoma of the CBD whom underwent an uneventful whipple 1/11/18. Patient has had an uptrending WBC since procedure, initially attributed to not restarting antibiotics for cholangitis, but experienced abdominal tenderness/distention and tachycardia yesterday and was transferred to SICU for close hemodynamic monitoring. Now s/p Celiotomy, evacuation of hematoma, cessation of gastroduodenal artery bleeding, and placement of Abthera vac 1/18. now s/p RTOR today for abthera exchange v. abdominal wall 1/21    PLAN:  -continue care per SICU  -Pain control as needed  -Monitor vs, uop  -Serial CBCs, H/H  -cont meropenem and vanc  -npo/ivf  -dvt ppx: 40mg lovenox subq daily

## 2019-01-22 NOTE — PROGRESS NOTE ADULT - SUBJECTIVE AND OBJECTIVE BOX
INTERVAL HPI/OVERNIGHT EVENTS:  Patient S&E at bedside.   Intubated, weaning off sedation.  Not responsive to questions.       VITAL SIGNS:  T(F): 98.6 (19 @ 12:00)  HR: 114 (19 @ 12:00)  BP: --  RR: 17 (19 @ 12:00)  SpO2: 95% (19 @ 12:00)      PHYSICAL EXAM:    Constitutional: intubated  Eyes: swollen, erythematous   Neck: supple  Respiratory: ventilator breath sounds   Cardiovascular: RRR  Gastrointestinal: abdominal binder in place  Extremities: no c/c/e  Neurological: not answering questions, weaning sedation      MEDICATIONS  (STANDING):  artificial  tears Solution 1 Drop(s) Both EYES every 12 hours  chlorhexidine 4% Liquid 1 Application(s) Topical <User Schedule>  dextrose 5% + sodium chloride 0.45%. 1000 milliLiter(s) (100 mL/Hr) IV Continuous <Continuous>  enoxaparin Injectable 40 milliGRAM(s) SubCutaneous daily  insulin lispro (HumaLOG) corrective regimen sliding scale   SubCutaneous every 6 hours  meropenem  IVPB 1000 milliGRAM(s) IV Intermittent every 8 hours  meropenem  IVPB      pantoprazole  Injectable 40 milliGRAM(s) IV Push daily  vancomycin  IVPB      vancomycin  IVPB 1000 milliGRAM(s) IV Intermittent every 12 hours    MEDICATIONS  (PRN):      Allergies  No Known Allergies          LABS:                        9.3    21.85 )-----------( 161      ( 2019 01:00 )             27.5         135  |  102  |  13  ----------------------------<  104<H>  4.4   |  22  |  0.51    Ca    7.7<L>      2019 01:00  Phos  2.7       Mg     2.0         TPro  4.8<L>  /  Alb  2.3<L>  /  TBili  4.7<H>  /  DBili  x   /  AST  66<H>  /  ALT  17  /  AlkPhos  164<H>      PT/INR - ( 2019 01:00 )   PT: 18.2 SEC;   INR: 1.57          PTT - ( 2019 01:00 )  PTT:36.5 SEC  Urinalysis Basic - ( 2019 04:00 )    Color: ORANGE / Appearance: TURBID / S.030 / pH: 6.0  Gluc: NEGATIVE / Ketone: SMALL  / Bili: MODERATE / Urobili: NORMAL   Blood: SMALL / Protein: 100 / Nitrite: POSITIVE   Leuk Esterase: NEGATIVE / RBC: 5-10 / WBC 5-10   Sq Epi: FEW / Non Sq Epi: x / Bacteria: MANY        RADIOLOGY & ADDITIONAL TESTS:  Studies reviewed.

## 2019-01-22 NOTE — PROGRESS NOTE ADULT - ATTENDING COMMENTS
Seen and examined, chart and note reviewed, case discussed with SICU team    Respiratory failure  a.  CPAP trial  b.  Hold dilaudid    Sepsis secondary E. coli/staph coag_ bacteremia  a.  Continue meropenem/vancomycin x 4 days  b.  cultures reviewed    Acute Kidney injury  a.  Received albumin fluid bolus  b.  Improving    Anemia  a,  Stable  b.  No further transfusion at this time    Spebt 40 minutes in critical care

## 2019-01-22 NOTE — PROGRESS NOTE ADULT - ASSESSMENT
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  s/p Whipple on 1/11.     # Invasive ampullary adenocarcinoma:   -s/p biopsy of ampullary mass done at [facility] ( from ERCP done in december 2018) showing invasive ampullary adenocarcinoma, foci consistent with lymphovascular invasion, ampullary surface epithelium showed high grade dysplasia and surface ulceration. Adenocarcinoma invades at least muscularis mucosae.  -  elevated at 318, CEA normal. AFP normal.    - Now s/p Whipple 1/11, biopsy pending.  Left message with pathology today to rush results - hopefully back this afternoon or tomorrow.  - Course complicated by gastroduodenal artery bleed requiring return to OR for hematoma evacuation and ligation on 1/18, returned to OR on 1/21 for abdomen washout and inna drain placement.         Glenna Brock MD  Hematology/Oncology Fellow, PGY-4  pager: [phone]  After 5pm or on weekends, please page the on-call fellow.

## 2019-01-22 NOTE — PROGRESS NOTE ADULT - SUBJECTIVE AND OBJECTIVE BOX
General Surgery Progress Note    SUBJECTIVE:  The patient was seen and examined. No acute events overnight. s/p RTOR today for abthera exchange v. abdominal wall yesterday,     OBJECTIVE:     ** VITAL SIGNS / I&O's **    Vital Signs Last 24 Hrs  T(C): 37 (2019 12:00), Max: 38.1 (2019 04:00)  T(F): 98.6 (2019 12:00), Max: 100.6 (2019 04:00)  HR: 118 (2019 13:00) (73 - 118)  BP: --  BP(mean): --  RR: 17 (2019 13:00) (0 - 17)  SpO2: 96% (2019 13:00) (95% - 100%)  Mode: AC/ CMV (Assist Control/ Continuous Mandatory Ventilation)  RR (machine): 14  TV (machine): 400  FiO2: 40  PEEP: 5  ITime: 1  MAP: 9  PIP: 23      2019 07:  -  2019 07:00  --------------------------------------------------------  IN:    Albumin 5%  - 250 mL: 500 mL    IV PiggyBack: 1100 mL    sodium chloride 0.9%: 2000 mL    vasopressin Infusion: 2.4 mL  Total IN: 3602.4 mL    OUT:    Bulb: 140 mL    Bulb: 60 mL    Bulb: 130 mL    Indwelling Catheter - Urethral: 1040 mL    Nasoenteral Tube: 100 mL  Total OUT: 1470 mL    Total NET: 2132.4 mL      2019 07:01  -  2019 13:28  --------------------------------------------------------  IN:    dextrose 5% + sodium chloride 0.45%.: 100 mL    sodium chloride 0.9%: 300 mL  Total IN: 400 mL    OUT:    Bulb: 10 mL    Bulb: 5 mL    Bulb: 50 mL    Indwelling Catheter - Urethral: 555 mL  Total OUT: 620 mL    Total NET: -220 mL          ** PHYSICAL EXAM **    -- CONSTITUTIONAL: Alert, NAD  -- PULMONARY: non-labored respirations  -- ABDOMEN: soft, mildly distended; midline dressing saturated, changed at beside, incision intact; abdominal binder in place; RLQ and LLQ JOURDAN dark sanguinous, RUQ JOURDAN with lighter SS output  -- EXTR: wwp    ** LABS **                          9.5    26.94 )-----------( 184      ( 2019 11:55 )             28.7     2019 11:55    135    |  100    |  11     ----------------------------<  120    4.3     |  23     |  0.47     Ca    7.8        2019 11:55  Phos  3.0       2019 11:55  Mg     2.0       2019 11:55    TPro  5.2    /  Alb  2.2    /  TBili  4.4    /  DBili  x      /  AST  70     /  ALT  20     /  AlkPhos  194    2019 11:55    PT/INR - ( 2019 11:54 )   PT: 19.3 SEC;   INR: 1.67          PTT - ( 2019 11:54 )  PTT:31.8 SEC  CAPILLARY BLOOD GLUCOSE      POCT Blood Glucose.: 110 mg/dL (2019 11:44)  POCT Blood Glucose.: 100 mg/dL (2019 05:49)  POCT Blood Glucose.: 97 mg/dL (2019 23:02)  POCT Blood Glucose.: 125 mg/dL (2019 18:10)        LIVER FUNCTIONS - ( 2019 11:55 )  Alb: 2.2 g/dL / Pro: 5.2 g/dL / ALK PHOS: 194 u/L / ALT: 20 u/L / AST: 70 u/L / GGT: x             Culture - Blood (collected 2019 02:28)  Source: BLOOD  Preliminary Report (2019 02:33):    NO ORGANISMS ISOLATED    NO ORGANISMS ISOLATED AT 24 HOURS    Culture - Blood (collected 2019 22:25)  Source: BLOOD VENOUS  Preliminary Report (2019 22:23):    NO ORGANISMS ISOLATED    NO ORGANISMS ISOLATED AT 48 HRS.    Culture - Blood (collected 2019 22:25)  Source: BLOOD PERIPHERAL  Preliminary Report (2019 22:23):    NO ORGANISMS ISOLATED    NO ORGANISMS ISOLATED AT 48 HRS.      Urinalysis Basic - ( 2019 04:00 )    Color: ORANGE / Appearance: TURBID / S.030 / pH: 6.0  Gluc: NEGATIVE / Ketone: SMALL  / Bili: MODERATE / Urobili: NORMAL   Blood: SMALL / Protein: 100 / Nitrite: POSITIVE   Leuk Esterase: NEGATIVE / RBC: 5-10 / WBC 5-10   Sq Epi: FEW / Non Sq Epi: x / Bacteria: MANY        MEDICATIONS  (STANDING):  artificial  tears Solution 1 Drop(s) Both EYES every 12 hours  chlorhexidine 4% Liquid 1 Application(s) Topical <User Schedule>  dextrose 5% + sodium chloride 0.45%. 1000 milliLiter(s) (100 mL/Hr) IV Continuous <Continuous>  enoxaparin Injectable 40 milliGRAM(s) SubCutaneous daily  insulin lispro (HumaLOG) corrective regimen sliding scale   SubCutaneous every 6 hours  meropenem  IVPB 1000 milliGRAM(s) IV Intermittent every 8 hours  meropenem  IVPB      pantoprazole  Injectable 40 milliGRAM(s) IV Push daily  vancomycin  IVPB      vancomycin  IVPB 1000 milliGRAM(s) IV Intermittent every 12 hours    MEDICATIONS  (PRN):

## 2019-01-23 LAB
ANION GAP SERPL CALC-SCNC: 12 MMO/L — SIGNIFICANT CHANGE UP (ref 7–14)
ANION GAP SERPL CALC-SCNC: 9 MMO/L — SIGNIFICANT CHANGE UP (ref 7–14)
APTT BLD: 32.8 SEC — SIGNIFICANT CHANGE UP (ref 27.5–36.3)
APTT BLD: 33 SEC — SIGNIFICANT CHANGE UP (ref 27.5–36.3)
BASE EXCESS BLDA CALC-SCNC: -0.9 MMOL/L — SIGNIFICANT CHANGE UP
BASE EXCESS BLDA CALC-SCNC: -1 MMOL/L — SIGNIFICANT CHANGE UP
BASE EXCESS BLDA CALC-SCNC: 0.3 MMOL/L — SIGNIFICANT CHANGE UP
BASOPHILS # BLD AUTO: 0.09 K/UL — SIGNIFICANT CHANGE UP (ref 0–0.2)
BASOPHILS NFR BLD AUTO: 0.4 % — SIGNIFICANT CHANGE UP (ref 0–2)
BUN SERPL-MCNC: 9 MG/DL — SIGNIFICANT CHANGE UP (ref 7–23)
BUN SERPL-MCNC: 9 MG/DL — SIGNIFICANT CHANGE UP (ref 7–23)
CA-I BLDA-SCNC: 1.08 MMOL/L — LOW (ref 1.15–1.29)
CA-I BLDA-SCNC: 1.09 MMOL/L — LOW (ref 1.15–1.29)
CA-I BLDA-SCNC: 1.23 MMOL/L — SIGNIFICANT CHANGE UP (ref 1.15–1.29)
CALCIUM SERPL-MCNC: 7.3 MG/DL — LOW (ref 8.4–10.5)
CALCIUM SERPL-MCNC: 7.5 MG/DL — LOW (ref 8.4–10.5)
CHLORIDE SERPL-SCNC: 100 MMOL/L — SIGNIFICANT CHANGE UP (ref 98–107)
CHLORIDE SERPL-SCNC: 100 MMOL/L — SIGNIFICANT CHANGE UP (ref 98–107)
CO2 SERPL-SCNC: 21 MMOL/L — LOW (ref 22–31)
CO2 SERPL-SCNC: 22 MMOL/L — SIGNIFICANT CHANGE UP (ref 22–31)
CREAT SERPL-MCNC: 0.43 MG/DL — LOW (ref 0.5–1.3)
CREAT SERPL-MCNC: 0.43 MG/DL — LOW (ref 0.5–1.3)
EOSINOPHIL # BLD AUTO: 0.62 K/UL — HIGH (ref 0–0.5)
EOSINOPHIL NFR BLD AUTO: 2.6 % — SIGNIFICANT CHANGE UP (ref 0–6)
FIBRINOGEN PPP-MCNC: 423.4 MG/DL — SIGNIFICANT CHANGE UP (ref 350–510)
FIBRINOGEN PPP-MCNC: 454 MG/DL — SIGNIFICANT CHANGE UP (ref 350–510)
GLUCOSE BLDA-MCNC: 136 MG/DL — HIGH (ref 70–99)
GLUCOSE BLDA-MCNC: 141 MG/DL — HIGH (ref 70–99)
GLUCOSE BLDA-MCNC: 166 MG/DL — HIGH (ref 70–99)
GLUCOSE SERPL-MCNC: 143 MG/DL — HIGH (ref 70–99)
GLUCOSE SERPL-MCNC: 163 MG/DL — HIGH (ref 70–99)
HCO3 BLDA-SCNC: 24 MMOL/L — SIGNIFICANT CHANGE UP (ref 22–26)
HCO3 BLDA-SCNC: 24 MMOL/L — SIGNIFICANT CHANGE UP (ref 22–26)
HCO3 BLDA-SCNC: 25 MMOL/L — SIGNIFICANT CHANGE UP (ref 22–26)
HCT VFR BLD CALC: 26.2 % — LOW (ref 34.5–45)
HCT VFR BLD CALC: 26.6 % — LOW (ref 34.5–45)
HCT VFR BLD CALC: 29.1 % — LOW (ref 34.5–45)
HCT VFR BLD CALC: 30.1 % — LOW (ref 34.5–45)
HCT VFR BLDA CALC: 31.2 % — LOW (ref 34.5–46.5)
HCT VFR BLDA CALC: 31.5 % — LOW (ref 34.5–46.5)
HCT VFR BLDA CALC: 36.8 % — SIGNIFICANT CHANGE UP (ref 34.5–46.5)
HGB BLD-MCNC: 10 G/DL — LOW (ref 11.5–15.5)
HGB BLD-MCNC: 8.5 G/DL — LOW (ref 11.5–15.5)
HGB BLD-MCNC: 8.6 G/DL — LOW (ref 11.5–15.5)
HGB BLD-MCNC: 9.7 G/DL — LOW (ref 11.5–15.5)
HGB BLDA-MCNC: 10.1 G/DL — LOW (ref 11.5–15.5)
HGB BLDA-MCNC: 10.2 G/DL — LOW (ref 11.5–15.5)
HGB BLDA-MCNC: 12 G/DL — SIGNIFICANT CHANGE UP (ref 11.5–15.5)
IMM GRANULOCYTES NFR BLD AUTO: 5 % — HIGH (ref 0–1.5)
INR BLD: 1.62 — HIGH (ref 0.88–1.17)
INR BLD: 1.75 — HIGH (ref 0.88–1.17)
LACTATE BLDA-SCNC: 3.3 MMOL/L — HIGH (ref 0.5–2)
LACTATE BLDA-SCNC: 3.5 MMOL/L — HIGH (ref 0.5–2)
LACTATE BLDA-SCNC: 3.6 MMOL/L — HIGH (ref 0.5–2)
LYMPHOCYTES # BLD AUTO: 11.6 % — LOW (ref 13–44)
LYMPHOCYTES # BLD AUTO: 2.82 K/UL — SIGNIFICANT CHANGE UP (ref 1–3.3)
MAGNESIUM SERPL-MCNC: 1.8 MG/DL — SIGNIFICANT CHANGE UP (ref 1.6–2.6)
MAGNESIUM SERPL-MCNC: 1.9 MG/DL — SIGNIFICANT CHANGE UP (ref 1.6–2.6)
MCHC RBC-ENTMCNC: 27.6 PG — SIGNIFICANT CHANGE UP (ref 27–34)
MCHC RBC-ENTMCNC: 27.6 PG — SIGNIFICANT CHANGE UP (ref 27–34)
MCHC RBC-ENTMCNC: 27.7 PG — SIGNIFICANT CHANGE UP (ref 27–34)
MCHC RBC-ENTMCNC: 28.2 PG — SIGNIFICANT CHANGE UP (ref 27–34)
MCHC RBC-ENTMCNC: 32.3 % — SIGNIFICANT CHANGE UP (ref 32–36)
MCHC RBC-ENTMCNC: 32.4 % — SIGNIFICANT CHANGE UP (ref 32–36)
MCHC RBC-ENTMCNC: 33.2 % — SIGNIFICANT CHANGE UP (ref 32–36)
MCHC RBC-ENTMCNC: 33.3 % — SIGNIFICANT CHANGE UP (ref 32–36)
MCV RBC AUTO: 83.1 FL — SIGNIFICANT CHANGE UP (ref 80–100)
MCV RBC AUTO: 84.6 FL — SIGNIFICANT CHANGE UP (ref 80–100)
MCV RBC AUTO: 85.1 FL — SIGNIFICANT CHANGE UP (ref 80–100)
MCV RBC AUTO: 85.8 FL — SIGNIFICANT CHANGE UP (ref 80–100)
MONOCYTES # BLD AUTO: 2.22 K/UL — HIGH (ref 0–0.9)
MONOCYTES NFR BLD AUTO: 9.1 % — SIGNIFICANT CHANGE UP (ref 2–14)
NEUTROPHILS # BLD AUTO: 17.33 K/UL — HIGH (ref 1.8–7.4)
NEUTROPHILS NFR BLD AUTO: 71.3 % — SIGNIFICANT CHANGE UP (ref 43–77)
NRBC # FLD: 0.09 K/UL — LOW (ref 25–125)
NRBC # FLD: 0.12 K/UL — LOW (ref 25–125)
NRBC # FLD: 0.13 K/UL — LOW (ref 25–125)
NRBC # FLD: 0.14 K/UL — LOW (ref 25–125)
PCO2 BLDA: 36 MMHG — SIGNIFICANT CHANGE UP (ref 32–48)
PCO2 BLDA: 37 MMHG — SIGNIFICANT CHANGE UP (ref 32–48)
PCO2 BLDA: 37 MMHG — SIGNIFICANT CHANGE UP (ref 32–48)
PH BLDA: 7.41 PH — SIGNIFICANT CHANGE UP (ref 7.35–7.45)
PH BLDA: 7.42 PH — SIGNIFICANT CHANGE UP (ref 7.35–7.45)
PH BLDA: 7.43 PH — SIGNIFICANT CHANGE UP (ref 7.35–7.45)
PHOSPHATE SERPL-MCNC: 2.1 MG/DL — LOW (ref 2.5–4.5)
PHOSPHATE SERPL-MCNC: 2.4 MG/DL — LOW (ref 2.5–4.5)
PLATELET # BLD AUTO: 153 K/UL — SIGNIFICANT CHANGE UP (ref 150–400)
PLATELET # BLD AUTO: 153 K/UL — SIGNIFICANT CHANGE UP (ref 150–400)
PLATELET # BLD AUTO: 160 K/UL — SIGNIFICANT CHANGE UP (ref 150–400)
PLATELET # BLD AUTO: 193 K/UL — SIGNIFICANT CHANGE UP (ref 150–400)
PMV BLD: 10.6 FL — SIGNIFICANT CHANGE UP (ref 7–13)
PMV BLD: 10.9 FL — SIGNIFICANT CHANGE UP (ref 7–13)
PMV BLD: 11 FL — SIGNIFICANT CHANGE UP (ref 7–13)
PMV BLD: 11.3 FL — SIGNIFICANT CHANGE UP (ref 7–13)
PO2 BLDA: 59 MMHG — LOW (ref 83–108)
PO2 BLDA: 76 MMHG — LOW (ref 83–108)
PO2 BLDA: 84 MMHG — SIGNIFICANT CHANGE UP (ref 83–108)
POTASSIUM BLDA-SCNC: 3.6 MMOL/L — SIGNIFICANT CHANGE UP (ref 3.4–4.5)
POTASSIUM BLDA-SCNC: 3.6 MMOL/L — SIGNIFICANT CHANGE UP (ref 3.4–4.5)
POTASSIUM BLDA-SCNC: 4.1 MMOL/L — SIGNIFICANT CHANGE UP (ref 3.4–4.5)
POTASSIUM SERPL-MCNC: 4 MMOL/L — SIGNIFICANT CHANGE UP (ref 3.5–5.3)
POTASSIUM SERPL-MCNC: 4 MMOL/L — SIGNIFICANT CHANGE UP (ref 3.5–5.3)
POTASSIUM SERPL-SCNC: 4 MMOL/L — SIGNIFICANT CHANGE UP (ref 3.5–5.3)
POTASSIUM SERPL-SCNC: 4 MMOL/L — SIGNIFICANT CHANGE UP (ref 3.5–5.3)
PROTHROM AB SERPL-ACNC: 18.2 SEC — HIGH (ref 9.8–13.1)
PROTHROM AB SERPL-ACNC: 20.3 SEC — HIGH (ref 9.8–13.1)
RBC # BLD: 3.08 M/UL — LOW (ref 3.8–5.2)
RBC # BLD: 3.1 M/UL — LOW (ref 3.8–5.2)
RBC # BLD: 3.44 M/UL — LOW (ref 3.8–5.2)
RBC # BLD: 3.62 M/UL — LOW (ref 3.8–5.2)
RBC # FLD: 17.8 % — HIGH (ref 10.3–14.5)
RBC # FLD: 18.2 % — HIGH (ref 10.3–14.5)
RBC # FLD: 19 % — HIGH (ref 10.3–14.5)
RBC # FLD: 19 % — HIGH (ref 10.3–14.5)
SAO2 % BLDA: 92.7 % — LOW (ref 95–99)
SAO2 % BLDA: 96 % — SIGNIFICANT CHANGE UP (ref 95–99)
SAO2 % BLDA: 96.9 % — SIGNIFICANT CHANGE UP (ref 95–99)
SODIUM BLDA-SCNC: 127 MMOL/L — LOW (ref 136–146)
SODIUM BLDA-SCNC: 128 MMOL/L — LOW (ref 136–146)
SODIUM BLDA-SCNC: 136 MMOL/L — SIGNIFICANT CHANGE UP (ref 136–146)
SODIUM SERPL-SCNC: 131 MMOL/L — LOW (ref 135–145)
SODIUM SERPL-SCNC: 133 MMOL/L — LOW (ref 135–145)
WBC # BLD: 17.23 K/UL — HIGH (ref 3.8–10.5)
WBC # BLD: 20.61 K/UL — HIGH (ref 3.8–10.5)
WBC # BLD: 20.67 K/UL — HIGH (ref 3.8–10.5)
WBC # BLD: 24.3 K/UL — HIGH (ref 3.8–10.5)
WBC # FLD AUTO: 17.23 K/UL — HIGH (ref 3.8–10.5)
WBC # FLD AUTO: 20.61 K/UL — HIGH (ref 3.8–10.5)
WBC # FLD AUTO: 20.67 K/UL — HIGH (ref 3.8–10.5)
WBC # FLD AUTO: 24.3 K/UL — HIGH (ref 3.8–10.5)

## 2019-01-23 PROCEDURE — 71045 X-RAY EXAM CHEST 1 VIEW: CPT | Mod: 26,77

## 2019-01-23 PROCEDURE — 99292 CRITICAL CARE ADDL 30 MIN: CPT

## 2019-01-23 PROCEDURE — 71045 X-RAY EXAM CHEST 1 VIEW: CPT | Mod: 26

## 2019-01-23 PROCEDURE — 36620 INSERTION CATHETER ARTERY: CPT

## 2019-01-23 PROCEDURE — 99291 CRITICAL CARE FIRST HOUR: CPT

## 2019-01-23 RX ORDER — FENTANYL CITRATE 50 UG/ML
0.5 INJECTION INTRAVENOUS
Qty: 2500 | Refills: 0 | Status: DISCONTINUED | OUTPATIENT
Start: 2019-01-23 | End: 2019-01-24

## 2019-01-23 RX ORDER — MAGNESIUM SULFATE 500 MG/ML
2 VIAL (ML) INJECTION ONCE
Qty: 0 | Refills: 0 | Status: COMPLETED | OUTPATIENT
Start: 2019-01-23 | End: 2019-01-23

## 2019-01-23 RX ORDER — HYDROMORPHONE HYDROCHLORIDE 2 MG/ML
0.5 INJECTION INTRAMUSCULAR; INTRAVENOUS; SUBCUTANEOUS ONCE
Qty: 0 | Refills: 0 | Status: DISCONTINUED | OUTPATIENT
Start: 2019-01-23 | End: 2019-01-23

## 2019-01-23 RX ORDER — SODIUM CHLORIDE 9 MG/ML
1000 INJECTION INTRAMUSCULAR; INTRAVENOUS; SUBCUTANEOUS ONCE
Qty: 0 | Refills: 0 | Status: COMPLETED | OUTPATIENT
Start: 2019-01-23 | End: 2019-01-23

## 2019-01-23 RX ORDER — PHYTONADIONE (VIT K1) 5 MG
5 TABLET ORAL ONCE
Qty: 0 | Refills: 0 | Status: COMPLETED | OUTPATIENT
Start: 2019-01-23 | End: 2019-01-23

## 2019-01-23 RX ORDER — SODIUM CHLORIDE 9 MG/ML
1000 INJECTION, SOLUTION INTRAVENOUS
Qty: 0 | Refills: 0 | Status: DISCONTINUED | OUTPATIENT
Start: 2019-01-23 | End: 2019-01-23

## 2019-01-23 RX ORDER — SODIUM CHLORIDE 9 MG/ML
1000 INJECTION, SOLUTION INTRAVENOUS
Qty: 0 | Refills: 0 | Status: DISCONTINUED | OUTPATIENT
Start: 2019-01-23 | End: 2019-01-24

## 2019-01-23 RX ORDER — POTASSIUM PHOSPHATE, MONOBASIC POTASSIUM PHOSPHATE, DIBASIC 236; 224 MG/ML; MG/ML
15 INJECTION, SOLUTION INTRAVENOUS ONCE
Qty: 0 | Refills: 0 | Status: COMPLETED | OUTPATIENT
Start: 2019-01-23 | End: 2019-01-23

## 2019-01-23 RX ORDER — FENTANYL CITRATE 50 UG/ML
0.5 INJECTION INTRAVENOUS
Qty: 2500 | Refills: 0 | Status: DISCONTINUED | OUTPATIENT
Start: 2019-01-23 | End: 2019-01-23

## 2019-01-23 RX ADMIN — HYDROMORPHONE HYDROCHLORIDE 0.5 MILLIGRAM(S): 2 INJECTION INTRAMUSCULAR; INTRAVENOUS; SUBCUTANEOUS at 09:41

## 2019-01-23 RX ADMIN — HYDROMORPHONE HYDROCHLORIDE 0.5 MILLIGRAM(S): 2 INJECTION INTRAMUSCULAR; INTRAVENOUS; SUBCUTANEOUS at 09:34

## 2019-01-23 RX ADMIN — SODIUM CHLORIDE 75 MILLILITER(S): 9 INJECTION, SOLUTION INTRAVENOUS at 19:56

## 2019-01-23 RX ADMIN — Medication 101 MILLIGRAM(S): at 14:16

## 2019-01-23 RX ADMIN — POTASSIUM PHOSPHATE, MONOBASIC POTASSIUM PHOSPHATE, DIBASIC 62.5 MILLIMOLE(S): 236; 224 INJECTION, SOLUTION INTRAVENOUS at 17:19

## 2019-01-23 RX ADMIN — Medication 63.75 MILLIMOLE(S): at 05:15

## 2019-01-23 RX ADMIN — MEROPENEM 100 MILLIGRAM(S): 1 INJECTION INTRAVENOUS at 05:22

## 2019-01-23 RX ADMIN — CHLORHEXIDINE GLUCONATE 15 MILLILITER(S): 213 SOLUTION TOPICAL at 17:20

## 2019-01-23 RX ADMIN — SODIUM CHLORIDE 1000 MILLILITER(S): 9 INJECTION INTRAMUSCULAR; INTRAVENOUS; SUBCUTANEOUS at 18:00

## 2019-01-23 RX ADMIN — FENTANYL CITRATE 0.5 MICROGRAM(S)/KG/HR: 50 INJECTION INTRAVENOUS at 19:55

## 2019-01-23 RX ADMIN — Medication 250 MILLIGRAM(S): at 05:21

## 2019-01-23 RX ADMIN — MEROPENEM 100 MILLIGRAM(S): 1 INJECTION INTRAVENOUS at 14:03

## 2019-01-23 RX ADMIN — FENTANYL CITRATE 0.5 MICROGRAM(S)/KG/HR: 50 INJECTION INTRAVENOUS at 17:14

## 2019-01-23 RX ADMIN — PANTOPRAZOLE SODIUM 40 MILLIGRAM(S): 20 TABLET, DELAYED RELEASE ORAL at 11:37

## 2019-01-23 RX ADMIN — Medication 3: at 06:28

## 2019-01-23 RX ADMIN — DEXMEDETOMIDINE HYDROCHLORIDE IN 0.9% SODIUM CHLORIDE 7.17 MICROGRAM(S)/KG/HR: 4 INJECTION INTRAVENOUS at 19:54

## 2019-01-23 RX ADMIN — FENTANYL CITRATE 2.87 MICROGRAM(S)/KG/HR: 50 INJECTION INTRAVENOUS at 11:36

## 2019-01-23 RX ADMIN — DEXMEDETOMIDINE HYDROCHLORIDE IN 0.9% SODIUM CHLORIDE 7.17 MICROGRAM(S)/KG/HR: 4 INJECTION INTRAVENOUS at 17:11

## 2019-01-23 RX ADMIN — SODIUM CHLORIDE 100 MILLILITER(S): 9 INJECTION, SOLUTION INTRAVENOUS at 09:32

## 2019-01-23 RX ADMIN — HYDROMORPHONE HYDROCHLORIDE 0.5 MILLIGRAM(S): 2 INJECTION INTRAMUSCULAR; INTRAVENOUS; SUBCUTANEOUS at 06:25

## 2019-01-23 RX ADMIN — CHLORHEXIDINE GLUCONATE 1 APPLICATION(S): 213 SOLUTION TOPICAL at 09:33

## 2019-01-23 RX ADMIN — Medication 50 GRAM(S): at 04:36

## 2019-01-23 RX ADMIN — DEXMEDETOMIDINE HYDROCHLORIDE IN 0.9% SODIUM CHLORIDE 7.17 MICROGRAM(S)/KG/HR: 4 INJECTION INTRAVENOUS at 09:32

## 2019-01-23 RX ADMIN — MEROPENEM 100 MILLIGRAM(S): 1 INJECTION INTRAVENOUS at 21:52

## 2019-01-23 RX ADMIN — MICAFUNGIN SODIUM 105 MILLIGRAM(S): 100 INJECTION, POWDER, LYOPHILIZED, FOR SOLUTION INTRAVENOUS at 21:52

## 2019-01-23 RX ADMIN — HYDROMORPHONE HYDROCHLORIDE 0.5 MILLIGRAM(S): 2 INJECTION INTRAMUSCULAR; INTRAVENOUS; SUBCUTANEOUS at 09:00

## 2019-01-23 RX ADMIN — FENTANYL CITRATE 2.87 MICROGRAM(S)/KG/HR: 50 INJECTION INTRAVENOUS at 19:53

## 2019-01-23 RX ADMIN — HYDROMORPHONE HYDROCHLORIDE 0.5 MILLIGRAM(S): 2 INJECTION INTRAMUSCULAR; INTRAVENOUS; SUBCUTANEOUS at 09:29

## 2019-01-23 RX ADMIN — CHLORHEXIDINE GLUCONATE 15 MILLILITER(S): 213 SOLUTION TOPICAL at 05:21

## 2019-01-23 RX ADMIN — Medication 250 MILLIGRAM(S): at 17:13

## 2019-01-23 RX ADMIN — Medication 101 MILLIGRAM(S): at 20:49

## 2019-01-23 RX ADMIN — Medication 1 DROP(S): at 05:21

## 2019-01-23 RX ADMIN — Medication 1 DROP(S): at 17:12

## 2019-01-23 RX ADMIN — ENOXAPARIN SODIUM 40 MILLIGRAM(S): 100 INJECTION SUBCUTANEOUS at 11:36

## 2019-01-23 RX ADMIN — Medication 0.5 MILLIGRAM(S): at 19:53

## 2019-01-23 RX ADMIN — HYDROMORPHONE HYDROCHLORIDE 0.5 MILLIGRAM(S): 2 INJECTION INTRAMUSCULAR; INTRAVENOUS; SUBCUTANEOUS at 06:40

## 2019-01-23 NOTE — PROGRESS NOTE ADULT - SUBJECTIVE AND OBJECTIVE BOX
SICU Daily Progress Note  =====================================================  Interval/Overnight Events: Patient with initially uptrending lactate, appeared hypovolemic and was given 1L LR. Continued to become hypotensive and was restarted on vasopressor support. UCx growing candida, diflucan changed to micafungin to r/o resistant organisms in the setting of worsening sepsis. Became hypoxic on CPAP trial.     HISTORY OF PRESENT ILLNESS:  NOEMI SOUSA is a 54y Female PMHx HTN, T2DM, asthma, depression with recent diagnosis of invasive ampullary adenocarcinoma of the CBD whom underwent an uneventful whipple 1/11/18. Patient has had an uptrending WBC since procedure, initially attributed to not restarting antibiotics for cholangitis, but presents today with abdominal tenderness/distention and tachycardia. Admitted to SICU for close hemodynamic monitoring.    Had increasing abdominal distension, tachycardia and hypotension. Transferred back to SICU. Now s/p Celiotomy, evacuation of hematoma, cessation of gastroduodenal artery bleeding, and placement of Abthera vac 1/18. Patient RTOR 1/21/19 for closure of abdomen with stratice mesh. Reece drain placed at the pancreaticojejunostomy and left lower JOURDAN drain in SQ. Post-op patient remained off vasopressors. FENA calculated to be pre-renal. 500CC albumin bolus given, urine output improved.     Allergies: No Known Allergies      MEDICATIONS:   --------------------------------------------------------------------------------------  Neurologic Medications  dexmedetomidine Infusion 0.5 MICROgram(s)/kG/Hr IV Continuous <Continuous>  HYDROmorphone  Injectable 0.5 milliGRAM(s) IV Push every 4 hours PRN Moderate Pain (4 - 6)    Respiratory Medications    Cardiovascular Medications  norepinephrine Infusion 0.05 MICROgram(s)/kG/Min IV Continuous <Continuous>    Gastrointestinal Medications  dextrose 5% + sodium chloride 0.45%. 1000 milliLiter(s) IV Continuous <Continuous>  pantoprazole  Injectable 40 milliGRAM(s) IV Push daily    Genitourinary Medications    Hematologic/Oncologic Medications  enoxaparin Injectable 40 milliGRAM(s) SubCutaneous daily    Antimicrobial/Immunologic Medications  meropenem  IVPB 1000 milliGRAM(s) IV Intermittent every 8 hours  meropenem  IVPB      micafungin IVPB 100 milliGRAM(s) IV Intermittent every 24 hours  micafungin IVPB      vancomycin  IVPB      vancomycin  IVPB 1000 milliGRAM(s) IV Intermittent every 12 hours    Endocrine/Metabolic Medications  insulin lispro (HumaLOG) corrective regimen sliding scale   SubCutaneous every 6 hours    Topical/Other Medications  artificial  tears Solution 1 Drop(s) Both EYES every 12 hours  chlorhexidine 0.12% Liquid 15 milliLiter(s) Oral Mucosa two times a day  chlorhexidine 4% Liquid 1 Application(s) Topical <User Schedule>    --------------------------------------------------------------------------------------    VITAL SIGNS, INS/OUTS (last 24 hours):  --------------------------------------------------------------------------------------  ((Insert SICU Vitals/Is+Os here))***  --------------------------------------------------------------------------------------    EXAM  NEUROLOGY  RASS:   -1  Exam: Arousable off sedation    HEENT  Exam: Normocephalic, atraumatic    RESPIRATORY  Exam: Lungs clear to auscultation, Normal expansion/effort.   Mechanical Ventilation: Mode: AC/ CMV (Assist Control/ Continuous Mandatory Ventilation), RR (machine): 14, TV (machine): 350, FiO2: 40, PEEP: 5, MAP: 10, PIP: 27    CARDIOVASCULAR  Exam: S1, S2.  Regular rate and rhythm.       GI/NUTRITION  Exam: Abdomen soft, mildly distended, Reece and JOURDAN drain with serosanguinous drainage. dressing c/d/i     VASCULAR  Exam: Extremities warm, pink, well-perfused.       SKIN  Exam: Good skin turgor, no skin breakdown.   METABOLIC/FLUIDS/ELECTROLYTES  dextrose 5% + sodium chloride 0.45%. 1000 milliLiter(s) IV Continuous <Continuous>    HEMATOLOGIC  [x] VTE Prophylaxis: enoxaparin Injectable 40 milliGRAM(s) SubCutaneous daily    Transfusions:	[] PRBC	[] Platelets		[] FFP	[] Cryoprecipitate    INFECTIOUS DISEASE  Antimicrobials/Immunologic Medications:  meropenem  IVPB 1000 milliGRAM(s) IV Intermittent every 8 hours  meropenem  IVPB      micafungin IVPB 100 milliGRAM(s) IV Intermittent every 24 hours  micafungin IVPB      vancomycin  IVPB      vancomycin  IVPB 1000 milliGRAM(s) IV Intermittent every 12 hours    Tubes/Lines/Drains  [x] Peripheral IV  [] Central Venous Line     	[] R	[] L	[] IJ	[] Fem	[] SC	Date Placed:   [] Arterial Line		[] R	[] L	[] Fem	[] Rad	[] Ax	Date Placed:   [] PICC		[] Midline		[] Mediport  [] Urinary Catheter		Date Placed:   [x] Necessity of urinary, arterial, and venous catheters discussed    LABS  --------------------------------------------------------------------------------------  ((Insert SICU Labs here))***  --------------------------------------------------------------------------------------    OTHER LABORATORY:     IMAGING STUDIES:   CXR:

## 2019-01-23 NOTE — PROCEDURE NOTE - PROCEDURE
<<-----Click on this checkbox to enter Procedure Insertion of central venous catheter  01/23/2019    Active  [id number]

## 2019-01-23 NOTE — PROGRESS NOTE ADULT - SUBJECTIVE AND OBJECTIVE BOX
General Surgery Progress Note    SUBJECTIVE:  The patient was seen and examined. No acute events overnight.     OBJECTIVE:     ** VITAL SIGNS / I&O's **    Vital Signs Last 24 Hrs  T(C): 36.1 (23 Jan 2019 04:00), Max: 38.2 (22 Jan 2019 20:00)  T(F): 97 (23 Jan 2019 04:00), Max: 100.8 (22 Jan 2019 20:00)  HR: 65 (23 Jan 2019 05:30) (63 - 128)  BP: --  BP(mean): --  RR: 14 (23 Jan 2019 05:30) (9 - 26)  SpO2: 99% (23 Jan 2019 05:30) (92% - 100%)  Mode: AC/ CMV (Assist Control/ Continuous Mandatory Ventilation)  RR (machine): 14  TV (machine): 400  FiO2: 30  PEEP: 5  PS: 10  MAP: 9  PIP: 24      21 Jan 2019 07:01  -  22 Jan 2019 07:00  --------------------------------------------------------  IN:    Albumin 5%  - 250 mL: 500 mL    IV PiggyBack: 1100 mL    sodium chloride 0.9%: 2000 mL    vasopressin Infusion: 2.4 mL  Total IN: 3602.4 mL    OUT:    Bulb: 140 mL    Bulb: 60 mL    Bulb: 130 mL    Indwelling Catheter - Urethral: 1040 mL    Nasoenteral Tube: 100 mL  Total OUT: 1470 mL    Total NET: 2132.4 mL      22 Jan 2019 07:01  -  23 Jan 2019 06:59  --------------------------------------------------------  IN:    dexmedetomidine Infusion: 33 mL    dextrose 5% + sodium chloride 0.45%.: 1900 mL    IV PiggyBack: 1150 mL    Lactated Ringers IV Bolus: 1000 mL    norepinephrine Infusion: 35.4 mL    sodium chloride 0.9%: 300 mL  Total IN: 4418.4 mL    OUT:    Bulb: 60 mL    Bulb: 10 mL    Bulb: 30 mL    Indwelling Catheter - Urethral: 1575 mL    Nasoenteral Tube: 200 mL  Total OUT: 1875 mL    Total NET: 2543.4 mL          ** PHYSICAL EXAM **    -- CONSTITUTIONAL: Alert, NAD  -- PULMONARY: non-labored respirations  -- ABDOMEN: soft, mildly distended; midline dressing saturated, changed at beside, incision intact; abdominal binder in place; RLQ, LLQ, and RUQ JPs ss.    ** LABS **                          8.5    24.30 )-----------( 193      ( 23 Jan 2019 03:00 )             26.2     23 Jan 2019 03:00    133    |  100    |  9      ----------------------------<  163    4.0     |  21     |  0.43     Ca    7.5        23 Jan 2019 03:00  Phos  2.1       23 Jan 2019 03:00  Mg     1.9       23 Jan 2019 03:00    TPro  5.0    /  Alb  2.1    /  TBili  3.9    /  DBili  3.1    /  AST  73     /  ALT  23     /  AlkPhos  199    22 Jan 2019 21:15    PT/INR - ( 22 Jan 2019 11:54 )   PT: 19.3 SEC;   INR: 1.67          PTT - ( 22 Jan 2019 11:54 )  PTT:31.8 SEC  CAPILLARY BLOOD GLUCOSE      POCT Blood Glucose.: 251 mg/dL (23 Jan 2019 06:26)  POCT Blood Glucose.: 140 mg/dL (22 Jan 2019 23:03)  POCT Blood Glucose.: 135 mg/dL (22 Jan 2019 17:10)  POCT Blood Glucose.: 110 mg/dL (22 Jan 2019 11:44)        LIVER FUNCTIONS - ( 22 Jan 2019 21:15 )  Alb: 2.1 g/dL / Pro: 5.0 g/dL / ALK PHOS: 199 u/L / ALT: 23 u/L / AST: 73 u/L / GGT: x             Culture - Urine (collected 21 Jan 2019 09:31)  Source: URINE CATHETER  Final Report (22 Jan 2019 14:53):    CALB^Candida albicans    COLONY COUNT: > = 100,000 CFU/ML    Culture - Blood (collected 21 Jan 2019 02:28)  Source: BLOOD  Preliminary Report (23 Jan 2019 02:34):    NO ORGANISMS ISOLATED    NO ORGANISMS ISOLATED AT 48 HRS.          MEDICATIONS  (STANDING):  artificial  tears Solution 1 Drop(s) Both EYES every 12 hours  chlorhexidine 0.12% Liquid 15 milliLiter(s) Oral Mucosa two times a day  chlorhexidine 4% Liquid 1 Application(s) Topical <User Schedule>  dexmedetomidine Infusion 0.5 MICROgram(s)/kG/Hr (7.175 mL/Hr) IV Continuous <Continuous>  dextrose 5% + sodium chloride 0.45%. 1000 milliLiter(s) (100 mL/Hr) IV Continuous <Continuous>  enoxaparin Injectable 40 milliGRAM(s) SubCutaneous daily  insulin lispro (HumaLOG) corrective regimen sliding scale   SubCutaneous every 6 hours  meropenem  IVPB 1000 milliGRAM(s) IV Intermittent every 8 hours  meropenem  IVPB      micafungin IVPB      micafungin IVPB 100 milliGRAM(s) IV Intermittent every 24 hours  norepinephrine Infusion 0.05 MICROgram(s)/kG/Min (2.691 mL/Hr) IV Continuous <Continuous>  pantoprazole  Injectable 40 milliGRAM(s) IV Push daily  vancomycin  IVPB      vancomycin  IVPB 1000 milliGRAM(s) IV Intermittent every 12 hours    MEDICATIONS  (PRN):  HYDROmorphone  Injectable 0.5 milliGRAM(s) IV Push every 4 hours PRN Moderate Pain (4 - 6)

## 2019-01-23 NOTE — PROGRESS NOTE ADULT - ASSESSMENT
54y Female PMHx HTN, T2DM, asthma, depression with recent diagnosis of invasive ampullary adenocarcinoma of the CBD whom underwent an uneventful whipple 1/11/18. Patient has had an uptrending WBC since procedure, initially attributed to not restarting antibiotics for cholangitis, but presents today with abdominal tenderness/distention and tachycardia. Admitted to SICU for close hemodynamic monitoring, plan for RTOR today for abthera exchange v. abdominal wall closure    Neuro:  - fentanyl gtt for sedative analgesia   - daily sedation vacation  - monitor mental status     Resp:  - q8 ABG, adjust vent settings prn  - CPAP trial as tolerated if patient remains stable then consider extubation    CV:  - monitor SBP, now off vasopressors   - IVF resuscitation as needed   - pending ECHO     GI:  -NPO  - TPN consult   -ct ppi ppx at home dose      Heme:   -monitor H/H, responded appropriately after 1U PRBC  -lovenox vte ppx    Renal:  - Strict I&O, monitor lytes, replete as needed  - D5 1/2NS @ 100  - urine output improved overnight     ID:   - febrile overnight to 100.6, UA positive, will f/u urine cultures  - ct meropenem/vancomycin for broadened coverage, no intra op cultures sent  - blood culture 1/18 f/u- +coag negative staph  - f/u BCX from 1/19 and 1/20  - Will continue Abx for 3 days (1/26) and reassess clinically    Endo:  - diabetic, FS and low dose corrective regimen for hyperglycemia     Access: LIJ central line, 2 PIV, JOURDAN draining serosanguinous, ETT    Dispo: SICU

## 2019-01-23 NOTE — PROGRESS NOTE ADULT - ASSESSMENT
54y Female PMHx HTN, T2DM, asthma, depression with recent diagnosis of invasive ampullary adenocarcinoma of the CBD whom underwent an uneventful whipple 1/11/18. Patient has had an uptrending WBC since procedure, initially attributed to not restarting antibiotics for cholangitis, but experienced abdominal tenderness/distention and tachycardia yesterday and was transferred to SICU for close hemodynamic monitoring. Now s/p Celiotomy, evacuation of hematoma, cessation of gastroduodenal artery bleeding, and placement of Abthera vac 1/18. now s/p RTOR today for abthera exchange v. abdominal wall 1/21    PLAN:  -continue care per SICU  -Pain control as needed  -Monitor vs, uop  -Monitor abd incision and note number of times dressing changed.  -Serial CBCs, H/H  -cont meropenem and vanc  -npo/ivf  -dvt ppx: 40mg lovenox subq daily

## 2019-01-23 NOTE — PROGRESS NOTE ADULT - ATTENDING COMMENTS
54y Female PMHx HTN, T2DM, asthma, depression with recent diagnosis of invasive ampullary adenocarcinoma of the CBD whom underwent an uneventful whipple 1/11/18. Patient has had an uptrending WBC since procedure, initially attributed to not restarting antibiotics for cholangitis, but presents today with abdominal tenderness/distention and tachycardia. Admitted to SICU for close hemodynamic monitoring, plan for RTOR today for abthera exchange v. abdominal wall closure    Neuro:  - restarted on fentanyl  -monitor mental status  -Increased precidex 0.7    Resp:  - failed CPAP trial, continue with full vent support  - continue to adjust vent settings based on daily ABG  -Flow track if extubate  -Will do lung ultrasound to assess for fluid overload status    CV:  - pt hypotensive - improved since clot evacuated   - IVF resuscitation as needed   - ECHO (1/19): Wnl    GI:  -elevated bilirubin, direct bilirubin elevated, normal liver enzymes, trend CMP q8h with lactates till stabilised   -ct ppi ppx at home dose  -Dressing change from oozing - cleaned out and clot evacuated- wet to dry dressing changes  -No need for CT At this time  -Change intro line today  -Nutrition support services for TPN tomorrow      Heme:   -monitor H/H, s/p MTP: monitor for bleeding,  -lovenox vte ppx    Renal:  - Strict I&O, monitor lytes, replete as needed, mild hyponatremia this AM changed LR to NS  - NS@100 m/hr Switch to d5 1/2NS @ 100  - Chest ultrasound for assessment of fluid overload was negative     ID:   - febrile overnight to 100.6, UA positive, will f/u urine cultures  - ct meropenem/vancomycin for broadened coverage, no intra op cultures sent- Will continue Abx for 4 more days and reassess clinically  - blood culture 1/18 f/u- +coag negative staph  -f/u BCX from 1/19 and 1/20  -Will continue Abx for 4 days (1/26) and reassess clinically  - Lactate now downtrending  -Switched to micafungin    Endo:  - diabetic, FS and low dose corrective regimen for hyperglycemia     Access: RIJ cordis, 2 PIV, 1 JOURDAN draining serosanguinous, ETT    Dispo: SICU for hemodynamic monitoring, ROT today    SICU Diganosis: Caloric deficit malnutrition, Hemorrhagic shock, candidemia  The patient is a critical care patient with life threatening hemodynamic and metabolic instability in SICU.  I have personally interviewed when possible and examined the patient, reviewed data and laboratory tests/x-rays and all pertinent electronic images.  I was physically present for the key portions of the evaluation and management (E/M) service provided.   The SICU team has a constant risk benefit analyzes discussion with the primary team, all consultants, House Staff and PA's on all decisions.  These diagnoses are unrelated to the surgical procedure noted above.  I meet with family if needed to get further history, discuss the case and make care decisions for this patient who might not be able to participate.  Time involved in performance of separately billable procedures was not counted toward my critical care time. There is no overlap.  I spent 55-75 minutes of critical care time for the diagnoses, assessment, plan and interventions.

## 2019-01-23 NOTE — PROVIDER CONTACT NOTE (OTHER) - ASSESSMENT
Pt's VSS. Pressure was held at the incision site w/a 4x4 until the SICU Team and MD Clair Curry arrived for bedside assessment.

## 2019-01-23 NOTE — PROGRESS NOTE ADULT - ASSESSMENT
Assessment and Plan:   · Assessment		  54y Female PMHx HTN, T2DM, asthma, depression with recent diagnosis of invasive ampullary adenocarcinoma of the CBD whom underwent an uneventful whipple 1/11/18. Patient has had an uptrending WBC since procedure, initially attributed to not restarting antibiotics for cholangitis, but presents today with abdominal tenderness/distention and tachycardia. Admitted to SICU for close hemodynamic monitoring, plan for RTOR today for abthera exchange v. abdominal wall closure    Neuro:  - restarted on fentanyl  -monitor mental status  -Increased precidex 0.7    Resp:  - failed CPAP trial, continue with full vent support  - continue to adjust vent settings based on daily ABG  -Flow track if extubate  -Will do lung ultrasound to assess for fluid overload status    CV:  - pt hypotensive - improved since clot evacuated   - IVF resuscitation as needed   - ECHO (1/19): Wnl    GI:  -elevated bilirubin, direct bilirubin elevated, normal liver enzymes, trend CMP q8h with lactates till stabilised   -ct ppi ppx at home dose  -Dressing change from oozing - cleaned out and clot evacuated- wet to dry dressing changes  -No need for CT At this time  -Change intro line today  -Nutrition support services for TPN tomorrow      Heme:   -monitor H/H, s/p MTP: monitor for bleeding,  -lovenox vte ppx    Renal:  - Strict I&O, monitor lytes, replete as needed, mild hyponatremia this AM changed LR to NS  - NS@100 m/hr Switch to d5 1/2NS @ 100  - Chest ultrasound for assessment of fluid overload was negative     ID:   - febrile overnight to 100.6, UA positive, will f/u urine cultures  - ct meropenem/vancomycin for broadened coverage, no intra op cultures sent- Will continue Abx for 4 more days and reassess clinically  - blood culture 1/18 f/u- +coag negative staph  -f/u BCX from 1/19 and 1/20  -Will continue Abx for 4 days (1/26) and reassess clinically  - Lactate now downtrending  -Switched to micafungin    Endo:  - diabetic, FS and low dose corrective regimen for hyperglycemia     Access: RIJ cordis, 2 PIV, 1 JOURDAN draining serosanguinous, ETT    Dispo: SICU for hemodynamic monitoring, ROT today    SICU Diganosis: Caloric deficit malnutrition, Hemorrhagic shock, candidemia

## 2019-01-23 NOTE — PROGRESS NOTE ADULT - ATTENDING COMMENTS
54y Female PMHx HTN, T2DM, asthma, depression with recent diagnosis of invasive ampullary adenocarcinoma of the CBD whom underwent an uneventful whipple 1/11/18. Patient has had an uptrending WBC since procedure, initially attributed to not restarting antibiotics for cholangitis, but presents today with abdominal tenderness/distention and tachycardia. Admitted to SICU for close hemodynamic monitoring, plan for RTOR today for abthera exchange v. abdominal wall closure    Neuro:  - keep off sedating medication  - monitor mental status     Resp:  - wean off vent, FiO2 to 40%, RR down to 20  - CPAP trial as tolerated if patient remains stable then consider extubation    CV:  -monitor SBP, now off vasopressors   - IVF resuscitation as needed   - pending ECHO     GI:  -NPO  -elevated bilirubin, direct bilirubin elevated, normal liver enzymes, trend CMP q6h with lactates till stabilised   -ct ppi ppx at home dose      Heme:   -monitor H/H, s/p MTP: monitor for bleeding,  -lovenox vte ppx    Renal:  - Strict I&O, monitor lytes, replete as needed  - D5 1/2NS @ 100  - urine output improved overnight     ID:   - febrile overnight to 100.6, UA positive, will f/u urine cultures  - ct meropenem/vancomycin for broadened coverage, no intra op cultures sent- Will continue Abx for 4 more days and reassess clinically  - blood culture 1/18 f/u- +coag negative staph  -f/u BCX from 1/19 and 1/20  -Will continue Abx for 4 days (1/26) and reassess clinically    Endo:  - diabetic, FS and low dose corrective regimen for hyperglycemia     Access: RIJ cordis, 2 PIV, 1 JOURDAN draining serosanguinous, ETT    Dispo: SICU for hemodynamic monitoring, ROT today  The patient is a critical care patient with life threatening hemodynamic and metabolic instability in SICU.  I have personally interviewed when possible and examined the patient, reviewed data and laboratory tests/x-rays and all pertinent electronic images.  I was physically present for the key portions of the evaluation and management (E/M) service provided.   The SICU team has a constant risk benefit analyzes discussion with the primary team, all consultants, House Staff and PA's on all decisions.  These diagnoses are unrelated to the surgical procedure noted above.  I meet with family if needed to get further history, discuss the case and make care decisions for this patient who might not be able to participate.  Time involved in performance of separately billable procedures was not counted toward my critical care time. There is no overlap.  I spent 55-75 minutes of critical care time for the diagnoses, assessment, plan and interventions.

## 2019-01-23 NOTE — PROCEDURE NOTE - PROCEDURE
<<-----Click on this checkbox to enter Procedure Insertion of arterial line with imaging guidance  01/23/2019    Active  [id number]

## 2019-01-23 NOTE — PROGRESS NOTE ADULT - ASSESSMENT
54y Female PMHx HTN, T2DM, asthma, depression with recent diagnosis of invasive ampullary adenocarcinoma of the CBD whom underwent an uneventful whipple 1/11/18. Patient has had an uptrending WBC since procedure, initially attributed to not restarting antibiotics for cholangitis, but presents today with abdominal tenderness/distention and tachycardia. Admitted to SICU for close hemodynamic monitoring, plan for RTOR today for abthera exchange v. abdominal wall closure    Neuro:  - keep off sedating medication  - monitor mental status     Resp:  - wean off vent, FiO2 to 40%, RR down to 20  - CPAP trial as tolerated if patient remains stable then consider extubation    CV:  -monitor SBP, now off vasopressors   - IVF resuscitation as needed   - pending ECHO     GI:  -NPO  -elevated bilirubin, direct bilirubin elevated, normal liver enzymes, trend CMP q6h with lactates till stabilised   -ct ppi ppx at home dose      Heme:   -monitor H/H, s/p MTP: monitor for bleeding,  -lovenox vte ppx    Renal:  - Strict I&O, monitor lytes, replete as needed  - D5 1/2NS @ 100  - urine output improved overnight     ID:   - febrile overnight to 100.6, UA positive, will f/u urine cultures  - ct meropenem/vancomycin for broadened coverage, no intra op cultures sent- Will continue Abx for 4 more days and reassess clinically  - blood culture 1/18 f/u- +coag negative staph  -f/u BCX from 1/19 and 1/20  -Will continue Abx for 4 days (1/26) and reassess clinically    Endo:  - diabetic, FS and low dose corrective regimen for hyperglycemia     Access: RIJ cordis, 2 PIV, 1 JOURDAN draining serosanguinous, ETT    Dispo: SICU for hemodynamic monitoring, ROT today

## 2019-01-23 NOTE — PROGRESS NOTE ADULT - SUBJECTIVE AND OBJECTIVE BOX
SICU PM Progress Note  =====================================================  Interval: Patient noted to have blood oozing from midline incision while hypotensive and on pressors.  Peak pressures noted to be elevated on vent and abdomen increasingly distended.  All staples removed and a large amount of clot evacuated from midline incision. 1U PRBC given. No active bleeding appreciated.  Fascia remains closed.  Wound packed with wet to dry dressing.  Introducer removed.  A new triple lumen was placed in the left IJ.  1 port will be saved for TPN, consult called.  Vitamin K given for elevated INR.      HISTORY OF PRESENT ILLNESS:  NOEMI SOUSA is a 54y Female PMHx HTN, T2DM, asthma, depression with recent diagnosis of invasive ampullary adenocarcinoma of the CBD whom underwent an uneventful whipple 1/11/18. Patient has had an uptrending WBC since procedure, initially attributed to not restarting antibiotics for cholangitis, but presents today with abdominal tenderness/distention and tachycardia. Admitted to SICU for close hemodynamic monitoring.    Had increasing abdominal distension, tachycardia and hypotension. Transferred back to SICU. Now s/p Celiotomy, evacuation of hematoma, cessation of gastroduodenal artery bleeding, and placement of Abthera vac 1/18. Patient RTOR 1/21/19 for closure of abdomen with stratice mesh. Reece drain placed at the pancreaticojejunostomy and left lower JOURDAN drain in SQ.      NEURO  RASS: -4    GCS:     CAM ICU:  Exam: sedated, minimally arousable.  Awakens but agitated when off sedation  Meds: dexmedetomidine Infusion 0.5 MICROgram(s)/kG/Hr IV Continuous <Continuous>  fentaNYL   Infusion 0.5 MICROgram(s)/kG/Hr IV Continuous <Continuous>    [x] Adequacy of sedation and pain control has been assessed and adjusted      RESPIRATORY  RR: 20 (01-23-19 @ 15:00) (0 - 26)  SpO2: 95% (01-23-19 @ 15:31) (91% - 99%)  Exam: unlabored, clear to auscultation bilaterally  Mechanical Ventilation: Mode: AC/ CMV (Assist Control/ Continuous Mandatory Ventilation), RR (machine): 14, RR (patient): 17, TV (machine): 350, FiO2: 40, PEEP: 5, PS: 10, MAP: 7, PIP: 23  ABG - ( 23 Jan 2019 15:54 )  pH: 7.41  /  pCO2: 37    /  pO2: 76    / HCO3: 24    / Base Excess: -0.9  /  SaO2: 96.0    Lactate: 3.3    [x] Extubation Readiness Assessed      CARDIOVASCULAR  HR: 76 (01-23-19 @ 15:31) (63 - 128)  BP: --  BP(mean): --  ABP: 134/68 (01-23-19 @ 15:00) (84/48 - 220/103)  ABP(mean): 87 (01-23-19 @ 15:00) (61 - 145)      Exam: regular rate and rhythm  Cardiac Rhythm: sinus  Perfusion     [x]Adequate   [ ]Inadequate  Mentation   []Normal       [ x]Reduced  Extremities  [x]Warm         [ ]Cool  Volume Status [ ]Hypervolemic [x]Euvolemic [ ]Hypovolemic  Meds:       GI/NUTRITION  Exam: distended, midline incision open with wet to dry dressing, no bleeding  Diet: NPO  Meds: pantoprazole  Injectable 40 milliGRAM(s) IV Push daily      GENITOURINARY  I&O's Detail    01-22 @ 07:01 - 01-23 @ 07:00  --------------------------------------------------------  IN:    dexmedetomidine Infusion: 37.3 mL    dextrose 5% + sodium chloride 0.45%.: 1900 mL    IV PiggyBack: 1500 mL    Lactated Ringers IV Bolus: 1000 mL    norepinephrine Infusion: 35.4 mL    sodium chloride 0.9%: 300 mL  Total IN: 4772.7 mL    OUT:    Bulb: 95 mL    Bulb: 90 mL    Bulb: 20 mL    Indwelling Catheter - Urethral: 1750 mL    Nasoenteral Tube: 250 mL  Total OUT: 2205 mL    Total NET: 2567.7 mL      01-23 @ 07:01 - 01-23 @ 16:41  --------------------------------------------------------  IN:    dexmedetomidine Infusion: 68.7 mL    dextrose 5% + sodium chloride 0.45%.: 800 mL    fentaNYL  Infusion: 34.2 mL    IV PiggyBack: 100 mL  Total IN: 1002.9 mL    OUT:    Indwelling Catheter - Urethral: 155 mL  Total OUT: 155 mL    Total NET: 847.9 mL          01-23    131<L>  |  100  |  9   ----------------------------<  143<H>  4.0   |  22  |  0.43<L>    Ca    7.3<L>      23 Jan 2019 15:54  Phos  2.4     01-23  Mg     1.8     01-23    TPro  5.0<L>  /  Alb  2.1<L>  /  TBili  3.9<H>  /  DBili  3.1<H>  /  AST  73<H>  /  ALT  23  /  AlkPhos  199<H>  01-22    [x ] Swanson catheter, indication: critically ill  Meds: dextrose 5% + sodium chloride 0.9%. 1000 milliLiter(s) IV Continuous <Continuous>  lactated ringers. 1000 milliLiter(s) IV Continuous <Continuous>        HEMATOLOGIC  Meds: enoxaparin Injectable 40 milliGRAM(s) SubCutaneous daily    [x] VTE Prophylaxis                        10.0   20.67 )-----------( 153      ( 23 Jan 2019 15:54 )             30.1     PT/INR - ( 23 Jan 2019 15:54 )   PT: 18.2 SEC;   INR: 1.62          PTT - ( 23 Jan 2019 15:54 )  PTT:33.0 SEC  Transfusion     [ 1] PRBC   [ ] Platelets   [ ] FFP   [ ] Cryoprecipitate      INFECTIOUS DISEASES  WBC Count: 20.67 K/uL (01-23 @ 15:54)  WBC Count: 20.61 K/uL (01-23 @ 06:00)  WBC Count: 24.30 K/uL (01-23 @ 03:00)  WBC Count: 26.98 K/uL (01-22 @ 21:15)    RECENT CULTURES:  Specimen Source: URINE CATHETER  Date/Time: 01-21 @ 09:31  Culture Results: --  Gram Stain: --  Organism: --  Specimen Source: BLOOD  Date/Time: 01-21 @ 02:28  Culture Results: --  Gram Stain: --  Organism: --  Specimen Source: BLOOD PERIPHERAL  Date/Time: 01-19 @ 22:25  Culture Results: --  Gram Stain: --  Organism: --  Specimen Source: BLOOD PERIPHERAL  Date/Time: 01-18 @ 19:36  Culture Results: --  Gram Stain: --  Organism: BLOOD CULTURE PCR  Staphylococcus sp.,coag neg    Meds: meropenem  IVPB 1000 milliGRAM(s) IV Intermittent every 8 hours  meropenem  IVPB      micafungin IVPB      micafungin IVPB 100 milliGRAM(s) IV Intermittent every 24 hours  vancomycin  IVPB      vancomycin  IVPB 1000 milliGRAM(s) IV Intermittent every 12 hours        ENDOCRINE  CAPILLARY BLOOD GLUCOSE      POCT Blood Glucose.: 140 mg/dL (23 Jan 2019 11:34)  POCT Blood Glucose.: 251 mg/dL (23 Jan 2019 06:26)  POCT Blood Glucose.: 140 mg/dL (22 Jan 2019 23:03)  POCT Blood Glucose.: 135 mg/dL (22 Jan 2019 17:10)    Meds: insulin lispro (HumaLOG) corrective regimen sliding scale   SubCutaneous every 6 hours        ACCESS DEVICES:  [x ] Peripheral IV  [ x] Central Venous Line	[ ] R	[x ] L	[ x] IJ	[ ] Fem	[ ] SC	Placed:   [x ] Arterial Line		[ ] R	[ ] L	[ ] Fem	[ ] Rad	[ ] Ax	Placed:   [ ] PICC:					[ ] Mediport  [ x] Urinary Catheter, Date Placed:   [x] Necessity of urinary, arterial, and venous catheters discussed    OTHER MEDICATIONS:  artificial  tears Solution 1 Drop(s) Both EYES every 12 hours  chlorhexidine 0.12% Liquid 15 milliLiter(s) Oral Mucosa two times a day  chlorhexidine 4% Liquid 1 Application(s) Topical <User Schedule>      CODE STATUS: full code

## 2019-01-23 NOTE — PROGRESS NOTE ADULT - SUBJECTIVE AND OBJECTIVE BOX
Interval/Overnight Events:  Pt trialed on CPAP, rpt ABG with hypoxemia - restarted on the full vent support. Pt continued to be hypoxemic, BP continueing to trend down, restarted on levophed. Lactate increasing. restarted fentanyl as pt grimacing and biting tube.      HISTORY OF PRESENT ILLNESS:  NOEMI SOUSA is a 54y Female PMHx HTN, T2DM, asthma, depression with recent diagnosis of invasive ampullary adenocarcinoma of the CBD whom underwent an uneventful whipple 18. Patient has had an uptrending WBC since procedure, initially attributed to not restarting antibiotics for cholangitis, but presents today with abdominal tenderness/distention and tachycardia. Admitted to SICU for close hemodynamic monitoring.      MEDICATIONS:   --------------------------------------------------------------------------------------  Neurologic Medications    Respiratory Medications    Cardiovascular Medications    Gastrointestinal Medications  pantoprazole  Injectable 40 milliGRAM(s) IV Push daily  sodium chloride 0.9%. 1000 milliLiter(s) IV Continuous <Continuous>    Genitourinary Medications    Hematologic/Oncologic Medications  enoxaparin Injectable 40 milliGRAM(s) SubCutaneous daily    Antimicrobial/Immunologic Medications  meropenem  IVPB 1000 milliGRAM(s) IV Intermittent every 8 hours  meropenem  IVPB      vancomycin  IVPB      vancomycin  IVPB 1000 milliGRAM(s) IV Intermittent every 12 hours    Endocrine/Metabolic Medications  insulin lispro (HumaLOG) corrective regimen sliding scale   SubCutaneous every 6 hours    Topical/Other Medications  chlorhexidine 4% Liquid 1 Application(s) Topical <User Schedule>    Vital Signs Last 24 Hrs  T(C): 37.1 (2019 08:00), Max: 38.1 (2019 04:00)  T(F): 98.8 (2019 08:00), Max: 100.6 (2019 04:00)  HR: 83 (2019 11:13) (73 - 97)  BP: --  BP(mean): --  RR: 14 (2019 10:00) (0 - 16)  SpO2: 99% (2019 11:13) (95% - 100%)    I&O's Detail    2019 07:01  -  2019 07:00  --------------------------------------------------------  IN:    Albumin 5%  - 250 mL: 500 mL    IV PiggyBack: 1100 mL    sodium chloride 0.9%: 2000 mL    vasopressin Infusion: 2.4 mL  Total IN: 3602.4 mL    OUT:    Bulb: 140 mL    Bulb: 60 mL    Bulb: 130 mL    Indwelling Catheter - Urethral: 1040 mL    Nasoenteral Tube: 100 mL  Total OUT: 1470 mL    Total NET: 2132.4 mL      2019 07:01  -  2019 11:34  --------------------------------------------------------  IN:    sodium chloride 0.9%: 300 mL  Total IN: 300 mL    OUT:    Bulb: 10 mL    Bulb: 5 mL    Bulb: 50 mL    Indwelling Catheter - Urethral: 305 mL  Total OUT: 370 mL    Total NET: -70 mL                                9.3    21.85 )-----------( 161      ( 2019 01:00 )             27.5       -    135  |  102  |  13  ----------------------------<  104<H>  4.4   |  22  |  0.51    Ca    7.7<L>      2019 01:00  Phos  2.7       Mg     2.0         TPro  4.8<L>  /  Alb  2.3<L>  /  TBili  4.7<H>  /  DBili  x   /  AST  66<H>  /  ALT  17  /  AlkPhos  164<H>        CAPILLARY BLOOD GLUCOSE      POCT Blood Glucose.: 100 mg/dL (2019 05:49)  POCT Blood Glucose.: 97 mg/dL (2019 23:02)  POCT Blood Glucose.: 125 mg/dL (2019 18:10)  POCT Blood Glucose.: 150 mg/dL (2019 11:49)      LIVER FUNCTIONS - ( 2019 01:00 )  Alb: 2.3 g/dL / Pro: 4.8 g/dL / ALK PHOS: 164 u/L / ALT: 17 u/L / AST: 66 u/L / GGT: x             PT/INR - ( 2019 01:00 )   PT: 18.2 SEC;   INR: 1.57          PTT - ( 2019 01:00 )  PTT:36.5 SEC    Urinalysis Basic - ( 2019 04:00 )    Color: ORANGE / Appearance: TURBID / S.030 / pH: 6.0  Gluc: NEGATIVE / Ketone: SMALL  / Bili: MODERATE / Urobili: NORMAL   Blood: SMALL / Protein: 100 / Nitrite: POSITIVE   Leuk Esterase: NEGATIVE / RBC: 5-10 / WBC 5-10   Sq Epi: FEW / Non Sq Epi: x / Bacteria: MANY        EXAM  NEUROLOGY  RASS:   -1  Exam: not arousal off sedating medications    HEENT  Exam: Normocephalic, atraumatic    RESPIRATORY  Exam: Lungs clear to auscultation, Normal expansion/effort.   Mechanical Ventilation: Mode: AC/ CMV (Assist Control/ Continuous Mandatory Ventilation), RR (machine): 14, TV (machine): 400, FiO2: 40, PEEP: 5, MAP: 10, PIP: 27    CARDIOVASCULAR  Exam: S1, S2.  Regular rate and rhythm.  hypotesnvie     GI/NUTRITION  Exam: Abdomen soft, mildly distended, Reece and JOURDAN drain with serosanguinous drainage. dressing c/d/i     VASCULAR  Exam: Extremities warm, pink, well-perfused.       SKIN  Exam: Good skin turgor, no skin breakdown.     METABOLIC/FLUIDS/ELECTROLYTES  sodium chloride 0.9%. 1000 milliLiter(s) IV Continuous <Continuous>      HEMATOLOGIC  [x] VTE Prophylaxis: enoxaparin Injectable 40 milliGRAM(s) SubCutaneous daily    Transfusions:	[] PRBC	[] Platelets		[] FFP	[] Cryoprecipitate    INFECTIOUS DISEASE  Antimicrobials/Immunologic Medications:  meropenem  IVPB 1000 milliGRAM(s) IV Intermittent every 8 hours  meropenem  IVPB      vancomycin  IVPB      vancomycin  IVPB 1000 milliGRAM(s) IV Intermittent every 12 hours    Day # 4     of    Meropenem/ Vanc    Tubes/Lines/Drains    [x] Peripheral IV  [] Central Venous Line     	[] R	[] L	[] IJ	[] Fem	[] SC	Date Placed:   [] Arterial Line		[] R	[] L	[] Fem	[] Rad	[] Ax	Date Placed:   [] PICC		[] Midline		[] Mediport  [x] Urinary Catheter		Date Placed:   [x] Necessity of urinary, arterial, and venous catheters discussed  JOURDAN Drain x3       IMAGING STUDIES:   CXR: Bilateral opacities suggestive of ARDS        Assessment and Plan:   · Assessment		  54y Female PMHx HTN, T2DM, asthma, depression with recent diagnosis of invasive ampullary adenocarcinoma of the CBD whom underwent an uneventful whipple 18. Patient has had an uptrending WBC since procedure, initially attributed to not restarting antibiotics for cholangitis, but presents today with abdominal tenderness/distention and tachycardia. Admitted to SICU for close hemodynamic monitoring, plan for RTOR today for abthera exchange v. abdominal wall closure    Neuro:  - restarted on fentanyl  -monitor mental status     Resp:  - failed CPAP trial, continue with full vent support  - continue to adjust vent settings based on daily ABG  -Flow track if extubate    CV:  - pt hypotensive, restarted on levophed  - IVF resuscitation as needed   - pending ECHO     GI:  -NPO  -elevated bilirubin, direct bilirubin elevated, normal liver enzymes, trend CMP q6h with lactates till stabilised   -ct ppi ppx at home dose      Heme:   -monitor H/H, s/p MTP: monitor for bleeding,  -lovenox vte ppx    Renal:  - Strict I&O, monitor lytes, replete as needed, mild hyponatremia this AM changed LR to NS  - NS@100 m/hr Switch to d5 1/2NS @ 100  - urine output improved overnight     ID:   - febrile overnight to 100.6, UA positive, will f/u urine cultures  - ct meropenem/vancomycin for broadened coverage, no intra op cultures sent- Will continue Abx for 4 more days and reassess clinically  - blood culture  f/u- +coag negative staph  -f/u BCX from  and   -Will continue Abx for 4 days () and reassess clinically  - increasing lactate, worsening hypotension, concern for possible abscess or another source of infection in abdomen    Endo:  - diabetic, FS and low dose corrective regimen for hyperglycemia     Access: RIJ cordis, 2 PIV, 1 JOURDAN draining serosanguinous, ETT    Dispo: SICU for hemodynamic monitoring, ROT today Interval/Overnight Events:  Pt trialed on CPAP, rpt ABG with hypoxemia - restarted on the full vent support. Pt continued to be hypoxemic, BP continueing to trend down, restarted on levophed. Lactate increasing. restarted fentanyl as pt grimacing and biting tube.      HISTORY OF PRESENT ILLNESS:  NOEMI SOUSA is a 54y Female PMHx HTN, T2DM, asthma, depression with recent diagnosis of invasive ampullary adenocarcinoma of the CBD whom underwent an uneventful whipple 18. Patient has had an uptrending WBC since procedure, initially attributed to not restarting antibiotics for cholangitis, but presents today with abdominal tenderness/distention and tachycardia. Admitted to SICU for close hemodynamic monitoring.      MEDICATIONS:   --------------------------------------------------------------------------------------  Neurologic Medications    Respiratory Medications    Cardiovascular Medications    Gastrointestinal Medications  pantoprazole  Injectable 40 milliGRAM(s) IV Push daily  sodium chloride 0.9%. 1000 milliLiter(s) IV Continuous <Continuous>    Genitourinary Medications    Hematologic/Oncologic Medications  enoxaparin Injectable 40 milliGRAM(s) SubCutaneous daily    Antimicrobial/Immunologic Medications  meropenem  IVPB 1000 milliGRAM(s) IV Intermittent every 8 hours  meropenem  IVPB      vancomycin  IVPB      vancomycin  IVPB 1000 milliGRAM(s) IV Intermittent every 12 hours    Endocrine/Metabolic Medications  insulin lispro (HumaLOG) corrective regimen sliding scale   SubCutaneous every 6 hours    Topical/Other Medications  chlorhexidine 4% Liquid 1 Application(s) Topical <User Schedule>    Vital Signs Last 24 Hrs  T(C): 37.1 (2019 08:00), Max: 38.1 (2019 04:00)  T(F): 98.8 (2019 08:00), Max: 100.6 (2019 04:00)  HR: 83 (2019 11:13) (73 - 97)  BP: --  BP(mean): --  RR: 14 (2019 10:00) (0 - 16)  SpO2: 99% (2019 11:13) (95% - 100%)    I&O's Detail    2019 07:01  -  2019 07:00  --------------------------------------------------------  IN:    Albumin 5%  - 250 mL: 500 mL    IV PiggyBack: 1100 mL    sodium chloride 0.9%: 2000 mL    vasopressin Infusion: 2.4 mL  Total IN: 3602.4 mL    OUT:    Bulb: 140 mL    Bulb: 60 mL    Bulb: 130 mL    Indwelling Catheter - Urethral: 1040 mL    Nasoenteral Tube: 100 mL  Total OUT: 1470 mL    Total NET: 2132.4 mL      2019 07:01  -  2019 11:34  --------------------------------------------------------  IN:    sodium chloride 0.9%: 300 mL  Total IN: 300 mL    OUT:    Bulb: 10 mL    Bulb: 5 mL    Bulb: 50 mL    Indwelling Catheter - Urethral: 305 mL  Total OUT: 370 mL    Total NET: -70 mL                                9.3    21.85 )-----------( 161      ( 2019 01:00 )             27.5       -    135  |  102  |  13  ----------------------------<  104<H>  4.4   |  22  |  0.51    Ca    7.7<L>      2019 01:00  Phos  2.7       Mg     2.0         TPro  4.8<L>  /  Alb  2.3<L>  /  TBili  4.7<H>  /  DBili  x   /  AST  66<H>  /  ALT  17  /  AlkPhos  164<H>        CAPILLARY BLOOD GLUCOSE      POCT Blood Glucose.: 100 mg/dL (2019 05:49)  POCT Blood Glucose.: 97 mg/dL (2019 23:02)  POCT Blood Glucose.: 125 mg/dL (2019 18:10)  POCT Blood Glucose.: 150 mg/dL (2019 11:49)      LIVER FUNCTIONS - ( 2019 01:00 )  Alb: 2.3 g/dL / Pro: 4.8 g/dL / ALK PHOS: 164 u/L / ALT: 17 u/L / AST: 66 u/L / GGT: x             PT/INR - ( 2019 01:00 )   PT: 18.2 SEC;   INR: 1.57          PTT - ( 2019 01:00 )  PTT:36.5 SEC    Urinalysis Basic - ( 2019 04:00 )    Color: ORANGE / Appearance: TURBID / S.030 / pH: 6.0  Gluc: NEGATIVE / Ketone: SMALL  / Bili: MODERATE / Urobili: NORMAL   Blood: SMALL / Protein: 100 / Nitrite: POSITIVE   Leuk Esterase: NEGATIVE / RBC: 5-10 / WBC 5-10   Sq Epi: FEW / Non Sq Epi: x / Bacteria: MANY        EXAM  NEUROLOGY  RASS:   -1  Exam: not arousal off sedating medications    HEENT  Exam: Normocephalic, atraumatic    RESPIRATORY  Exam: Lungs clear to auscultation, Normal expansion/effort.   Mechanical Ventilation: Mode: AC/ CMV (Assist Control/ Continuous Mandatory Ventilation), RR (machine): 14, TV (machine): 400, FiO2: 40, PEEP: 5, MAP: 10, PIP: 27    CARDIOVASCULAR  Exam: S1, S2.  Regular rate and rhythm.  hypotesnvie     GI/NUTRITION  Exam: Abdomen soft, mildly distended, Reece and JOURDAN drain with serosanguinous drainage. dressing c/d/i     VASCULAR  Exam: Extremities warm, pink, well-perfused.       SKIN  Exam: Good skin turgor, no skin breakdown.     METABOLIC/FLUIDS/ELECTROLYTES  sodium chloride 0.9%. 1000 milliLiter(s) IV Continuous <Continuous>      HEMATOLOGIC  [x] VTE Prophylaxis: enoxaparin Injectable 40 milliGRAM(s) SubCutaneous daily    Transfusions:	[] PRBC	[] Platelets		[] FFP	[] Cryoprecipitate    INFECTIOUS DISEASE  Antimicrobials/Immunologic Medications:  meropenem  IVPB 1000 milliGRAM(s) IV Intermittent every 8 hours  meropenem  IVPB      vancomycin  IVPB      vancomycin  IVPB 1000 milliGRAM(s) IV Intermittent every 12 hours    Day # 4     of    Meropenem/ Vanc    Tubes/Lines/Drains    [x] Peripheral IV  [] Central Venous Line     	[] R	[] L	[] IJ	[] Fem	[] SC	Date Placed:   [] Arterial Line		[] R	[] L	[] Fem	[] Rad	[] Ax	Date Placed:   [] PICC		[] Midline		[] Mediport  [x] Urinary Catheter		Date Placed:   [x] Necessity of urinary, arterial, and venous catheters discussed  JOURDAN Drain x3       IMAGING STUDIES:   CXR: Bilateral opacities suggestive of ARDS        Assessment and Plan:   · Assessment		  54y Female PMHx HTN, T2DM, asthma, depression with recent diagnosis of invasive ampullary adenocarcinoma of the CBD whom underwent an uneventful whipple 18. Patient has had an uptrending WBC since procedure, initially attributed to not restarting antibiotics for cholangitis, but presents today with abdominal tenderness/distention and tachycardia. Admitted to SICU for close hemodynamic monitoring, plan for RTOR today for abthera exchange v. abdominal wall closure    Neuro:  - restarted on fentanyl  -monitor mental status  -Increased precidex 0.7    Resp:  - failed CPAP trial, continue with full vent support  - continue to adjust vent settings based on daily ABG  -Flow track if extubate  -Will do lung ultrasound to assess for fluid overload status    CV:  - pt hypotensive - improved since clot evacuated   - IVF resuscitation as needed   - pending ECHO     GI:  -elevated bilirubin, direct bilirubin elevated, normal liver enzymes, trend CMP q8h with lactates till stabilised   -ct ppi ppx at home dose  -Dressing change from oozing - cleaned out and clot evacuated- wet to dry dressing changes  -No need for CT At this time  -Change intro line  -Nutrition support services for TPN      Heme:   -monitor H/H, s/p MTP: monitor for bleeding,  -lovenox vte ppx    Renal:  - Strict I&O, monitor lytes, replete as needed, mild hyponatremia this AM changed LR to NS  - NS@100 m/hr Switch to d5 1/2NS @ 100  - Chest ultrasound for assessment of fluid overload    ID:   - febrile overnight to 100.6, UA positive, will f/u urine cultures  - ct meropenem/vancomycin for broadened coverage, no intra op cultures sent- Will continue Abx for 4 more days and reassess clinically  - blood culture  f/u- +coag negative staph  -f/u BCX from  and   -Will continue Abx for 4 days () and reassess clinically  - Lactate now downtrending  -Switched to mycofungin    Endo:  - diabetic, FS and low dose corrective regimen for hyperglycemia     Access: RIJ cordis, 2 PIV, 1 JOURDAN draining serosanguinous, ETT    Dispo: SICU for hemodynamic monitoring, ROT today    SICU Diganosis: Caloric deficit malnutrition, Hemorrhagic shock, candidemia Interval/Overnight Events:  Pt trialed on CPAP, rpt ABG with hypoxemia - restarted on the full vent support. Pt continued to be hypoxemic, BP continueing to trend down, restarted on levophed. Lactate increasing. restarted fentanyl as pt grimacing and biting tube.      HISTORY OF PRESENT ILLNESS:  NOEIM SOUSA is a 54y Female PMHx HTN, T2DM, asthma, depression with recent diagnosis of invasive ampullary adenocarcinoma of the CBD whom underwent an uneventful whipple 18. Patient has had an uptrending WBC since procedure, initially attributed to not restarting antibiotics for cholangitis, but presents today with abdominal tenderness/distention and tachycardia. Admitted to SICU for close hemodynamic monitoring.      MEDICATIONS:   --------------------------------------------------------------------------------------  Neurologic Medications    Respiratory Medications    Cardiovascular Medications    Gastrointestinal Medications  pantoprazole  Injectable 40 milliGRAM(s) IV Push daily  sodium chloride 0.9%. 1000 milliLiter(s) IV Continuous <Continuous>    Genitourinary Medications    Hematologic/Oncologic Medications  enoxaparin Injectable 40 milliGRAM(s) SubCutaneous daily    Antimicrobial/Immunologic Medications  meropenem  IVPB 1000 milliGRAM(s) IV Intermittent every 8 hours  meropenem  IVPB      vancomycin  IVPB      vancomycin  IVPB 1000 milliGRAM(s) IV Intermittent every 12 hours    Endocrine/Metabolic Medications  insulin lispro (HumaLOG) corrective regimen sliding scale   SubCutaneous every 6 hours    Topical/Other Medications  chlorhexidine 4% Liquid 1 Application(s) Topical <User Schedule>    Vital Signs Last 24 Hrs  T(C): 37.1 (2019 08:00), Max: 38.1 (2019 04:00)  T(F): 98.8 (2019 08:00), Max: 100.6 (2019 04:00)  HR: 83 (2019 11:13) (73 - 97)  BP: --  BP(mean): --  RR: 14 (2019 10:00) (0 - 16)  SpO2: 99% (2019 11:13) (95% - 100%)    I&O's Detail    2019 07:01  -  2019 07:00  --------------------------------------------------------  IN:    Albumin 5%  - 250 mL: 500 mL    IV PiggyBack: 1100 mL    sodium chloride 0.9%: 2000 mL    vasopressin Infusion: 2.4 mL  Total IN: 3602.4 mL    OUT:    Bulb: 140 mL    Bulb: 60 mL    Bulb: 130 mL    Indwelling Catheter - Urethral: 1040 mL    Nasoenteral Tube: 100 mL  Total OUT: 1470 mL    Total NET: 2132.4 mL      2019 07:01  -  2019 11:34  --------------------------------------------------------  IN:    sodium chloride 0.9%: 300 mL  Total IN: 300 mL    OUT:    Bulb: 10 mL    Bulb: 5 mL    Bulb: 50 mL    Indwelling Catheter - Urethral: 305 mL  Total OUT: 370 mL    Total NET: -70 mL                                9.3    21.85 )-----------( 161      ( 2019 01:00 )             27.5       -    135  |  102  |  13  ----------------------------<  104<H>  4.4   |  22  |  0.51    Ca    7.7<L>      2019 01:00  Phos  2.7       Mg     2.0         TPro  4.8<L>  /  Alb  2.3<L>  /  TBili  4.7<H>  /  DBili  x   /  AST  66<H>  /  ALT  17  /  AlkPhos  164<H>        CAPILLARY BLOOD GLUCOSE      POCT Blood Glucose.: 100 mg/dL (2019 05:49)  POCT Blood Glucose.: 97 mg/dL (2019 23:02)  POCT Blood Glucose.: 125 mg/dL (2019 18:10)  POCT Blood Glucose.: 150 mg/dL (2019 11:49)      LIVER FUNCTIONS - ( 2019 01:00 )  Alb: 2.3 g/dL / Pro: 4.8 g/dL / ALK PHOS: 164 u/L / ALT: 17 u/L / AST: 66 u/L / GGT: x             PT/INR - ( 2019 01:00 )   PT: 18.2 SEC;   INR: 1.57          PTT - ( 2019 01:00 )  PTT:36.5 SEC    Urinalysis Basic - ( 2019 04:00 )    Color: ORANGE / Appearance: TURBID / S.030 / pH: 6.0  Gluc: NEGATIVE / Ketone: SMALL  / Bili: MODERATE / Urobili: NORMAL   Blood: SMALL / Protein: 100 / Nitrite: POSITIVE   Leuk Esterase: NEGATIVE / RBC: 5-10 / WBC 5-10   Sq Epi: FEW / Non Sq Epi: x / Bacteria: MANY        EXAM  NEUROLOGY  RASS:   -1  Exam: not arousal off sedating medications    HEENT  Exam: Normocephalic, atraumatic    RESPIRATORY  Exam: Lungs clear to auscultation, Normal expansion/effort.   Mechanical Ventilation: Mode: AC/ CMV (Assist Control/ Continuous Mandatory Ventilation), RR (machine): 14, TV (machine): 400, FiO2: 40, PEEP: 5, MAP: 10, PIP: 27    CARDIOVASCULAR  Exam: S1, S2.  Regular rate and rhythm.  hypotesnvie     GI/NUTRITION  Exam: Abdomen soft, mildly distended, Reece and JOURDAN drain with serosanguinous drainage. dressing c/d/i     VASCULAR  Exam: Extremities warm, pink, well-perfused.       SKIN  Exam: Good skin turgor, no skin breakdown.     METABOLIC/FLUIDS/ELECTROLYTES  sodium chloride 0.9%. 1000 milliLiter(s) IV Continuous <Continuous>      HEMATOLOGIC  [x] VTE Prophylaxis: enoxaparin Injectable 40 milliGRAM(s) SubCutaneous daily    Transfusions:	[] PRBC	[] Platelets		[] FFP	[] Cryoprecipitate    INFECTIOUS DISEASE  Antimicrobials/Immunologic Medications:  meropenem  IVPB 1000 milliGRAM(s) IV Intermittent every 8 hours  meropenem  IVPB      vancomycin  IVPB      vancomycin  IVPB 1000 milliGRAM(s) IV Intermittent every 12 hours    Day # 4     of    Meropenem/ Vanc    Tubes/Lines/Drains    [x] Peripheral IV  [] Central Venous Line     	[] R	[] L	[] IJ	[] Fem	[] SC	Date Placed:   [] Arterial Line		[] R	[] L	[] Fem	[] Rad	[] Ax	Date Placed:   [] PICC		[] Midline		[] Mediport  [x] Urinary Catheter		Date Placed:   [x] Necessity of urinary, arterial, and venous catheters discussed  JOURDAN Drain x3       IMAGING STUDIES:   CXR: Bilateral opacities suggestive of ARDS

## 2019-01-24 LAB
ANION GAP SERPL CALC-SCNC: 11 MMO/L — SIGNIFICANT CHANGE UP (ref 7–14)
ANION GAP SERPL CALC-SCNC: 12 MMO/L — SIGNIFICANT CHANGE UP (ref 7–14)
BACTERIA BLD CULT: SIGNIFICANT CHANGE UP
BACTERIA BLD CULT: SIGNIFICANT CHANGE UP
BASE EXCESS BLDA CALC-SCNC: -0.2 MMOL/L — SIGNIFICANT CHANGE UP
BASE EXCESS BLDA CALC-SCNC: 0.3 MMOL/L — SIGNIFICANT CHANGE UP
BASOPHILS # BLD AUTO: 0.06 K/UL — SIGNIFICANT CHANGE UP (ref 0–0.2)
BASOPHILS # BLD AUTO: 0.08 K/UL — SIGNIFICANT CHANGE UP (ref 0–0.2)
BASOPHILS NFR BLD AUTO: 0.4 % — SIGNIFICANT CHANGE UP (ref 0–2)
BASOPHILS NFR BLD AUTO: 0.4 % — SIGNIFICANT CHANGE UP (ref 0–2)
BUN SERPL-MCNC: 9 MG/DL — SIGNIFICANT CHANGE UP (ref 7–23)
BUN SERPL-MCNC: 9 MG/DL — SIGNIFICANT CHANGE UP (ref 7–23)
CA-I BLDA-SCNC: 1.08 MMOL/L — LOW (ref 1.15–1.29)
CA-I BLDA-SCNC: 1.09 MMOL/L — LOW (ref 1.15–1.29)
CALCIUM SERPL-MCNC: 7.3 MG/DL — LOW (ref 8.4–10.5)
CALCIUM SERPL-MCNC: 7.3 MG/DL — LOW (ref 8.4–10.5)
CHLORIDE SERPL-SCNC: 100 MMOL/L — SIGNIFICANT CHANGE UP (ref 98–107)
CHLORIDE SERPL-SCNC: 99 MMOL/L — SIGNIFICANT CHANGE UP (ref 98–107)
CO2 SERPL-SCNC: 21 MMOL/L — LOW (ref 22–31)
CO2 SERPL-SCNC: 21 MMOL/L — LOW (ref 22–31)
CREAT SERPL-MCNC: 0.46 MG/DL — LOW (ref 0.5–1.3)
CREAT SERPL-MCNC: 0.52 MG/DL — SIGNIFICANT CHANGE UP (ref 0.5–1.3)
EOSINOPHIL # BLD AUTO: 0.33 K/UL — SIGNIFICANT CHANGE UP (ref 0–0.5)
EOSINOPHIL # BLD AUTO: 0.48 K/UL — SIGNIFICANT CHANGE UP (ref 0–0.5)
EOSINOPHIL NFR BLD AUTO: 2.4 % — SIGNIFICANT CHANGE UP (ref 0–6)
EOSINOPHIL NFR BLD AUTO: 2.7 % — SIGNIFICANT CHANGE UP (ref 0–6)
GLUCOSE BLDA-MCNC: 152 MG/DL — HIGH (ref 70–99)
GLUCOSE BLDA-MCNC: 154 MG/DL — HIGH (ref 70–99)
GLUCOSE SERPL-MCNC: 136 MG/DL — HIGH (ref 70–99)
GLUCOSE SERPL-MCNC: 155 MG/DL — HIGH (ref 70–99)
HCO3 BLDA-SCNC: 24 MMOL/L — SIGNIFICANT CHANGE UP (ref 22–26)
HCO3 BLDA-SCNC: 25 MMOL/L — SIGNIFICANT CHANGE UP (ref 22–26)
HCT VFR BLD CALC: 28.2 % — LOW (ref 34.5–45)
HCT VFR BLD CALC: 28.8 % — LOW (ref 34.5–45)
HCT VFR BLDA CALC: 29.3 % — LOW (ref 34.5–46.5)
HCT VFR BLDA CALC: 30.6 % — LOW (ref 34.5–46.5)
HGB BLD-MCNC: 9.4 G/DL — LOW (ref 11.5–15.5)
HGB BLD-MCNC: 9.5 G/DL — LOW (ref 11.5–15.5)
HGB BLDA-MCNC: 9.5 G/DL — LOW (ref 11.5–15.5)
HGB BLDA-MCNC: 9.9 G/DL — LOW (ref 11.5–15.5)
IMM GRANULOCYTES NFR BLD AUTO: 5.8 % — HIGH (ref 0–1.5)
IMM GRANULOCYTES NFR BLD AUTO: 7.1 % — HIGH (ref 0–1.5)
LACTATE BLDA-SCNC: 2.6 MMOL/L — HIGH (ref 0.5–2)
LACTATE BLDA-SCNC: 2.8 MMOL/L — HIGH (ref 0.5–2)
LYMPHOCYTES # BLD AUTO: 15.4 % — SIGNIFICANT CHANGE UP (ref 13–44)
LYMPHOCYTES # BLD AUTO: 16.8 % — SIGNIFICANT CHANGE UP (ref 13–44)
LYMPHOCYTES # BLD AUTO: 2.3 K/UL — SIGNIFICANT CHANGE UP (ref 1–3.3)
LYMPHOCYTES # BLD AUTO: 2.74 K/UL — SIGNIFICANT CHANGE UP (ref 1–3.3)
MAGNESIUM SERPL-MCNC: 1.7 MG/DL — SIGNIFICANT CHANGE UP (ref 1.6–2.6)
MAGNESIUM SERPL-MCNC: 2.3 MG/DL — SIGNIFICANT CHANGE UP (ref 1.6–2.6)
MCHC RBC-ENTMCNC: 27.6 PG — SIGNIFICANT CHANGE UP (ref 27–34)
MCHC RBC-ENTMCNC: 28.5 PG — SIGNIFICANT CHANGE UP (ref 27–34)
MCHC RBC-ENTMCNC: 33 % — SIGNIFICANT CHANGE UP (ref 32–36)
MCHC RBC-ENTMCNC: 33.3 % — SIGNIFICANT CHANGE UP (ref 32–36)
MCV RBC AUTO: 83.7 FL — SIGNIFICANT CHANGE UP (ref 80–100)
MCV RBC AUTO: 85.5 FL — SIGNIFICANT CHANGE UP (ref 80–100)
MONOCYTES # BLD AUTO: 1.43 K/UL — HIGH (ref 0–0.9)
MONOCYTES # BLD AUTO: 1.79 K/UL — HIGH (ref 0–0.9)
MONOCYTES NFR BLD AUTO: 10 % — SIGNIFICANT CHANGE UP (ref 2–14)
MONOCYTES NFR BLD AUTO: 10.5 % — SIGNIFICANT CHANGE UP (ref 2–14)
NEUTROPHILS # BLD AUTO: 11.47 K/UL — HIGH (ref 1.8–7.4)
NEUTROPHILS # BLD AUTO: 8.76 K/UL — HIGH (ref 1.8–7.4)
NEUTROPHILS NFR BLD AUTO: 64.1 % — SIGNIFICANT CHANGE UP (ref 43–77)
NEUTROPHILS NFR BLD AUTO: 64.4 % — SIGNIFICANT CHANGE UP (ref 43–77)
NRBC # FLD: 0.06 K/UL — LOW (ref 25–125)
NRBC # FLD: 0.08 K/UL — LOW (ref 25–125)
PCO2 BLDA: 37 MMHG — SIGNIFICANT CHANGE UP (ref 32–48)
PCO2 BLDA: 38 MMHG — SIGNIFICANT CHANGE UP (ref 32–48)
PH BLDA: 7.42 PH — SIGNIFICANT CHANGE UP (ref 7.35–7.45)
PH BLDA: 7.42 PH — SIGNIFICANT CHANGE UP (ref 7.35–7.45)
PHOSPHATE SERPL-MCNC: 2.6 MG/DL — SIGNIFICANT CHANGE UP (ref 2.5–4.5)
PHOSPHATE SERPL-MCNC: 2.9 MG/DL — SIGNIFICANT CHANGE UP (ref 2.5–4.5)
PLATELET # BLD AUTO: 156 K/UL — SIGNIFICANT CHANGE UP (ref 150–400)
PLATELET # BLD AUTO: 163 K/UL — SIGNIFICANT CHANGE UP (ref 150–400)
PMV BLD: 11 FL — SIGNIFICANT CHANGE UP (ref 7–13)
PMV BLD: 11.2 FL — SIGNIFICANT CHANGE UP (ref 7–13)
PO2 BLDA: 138 MMHG — HIGH (ref 83–108)
PO2 BLDA: 62 MMHG — LOW (ref 83–108)
POTASSIUM BLDA-SCNC: 3.7 MMOL/L — SIGNIFICANT CHANGE UP (ref 3.4–4.5)
POTASSIUM BLDA-SCNC: 3.7 MMOL/L — SIGNIFICANT CHANGE UP (ref 3.4–4.5)
POTASSIUM SERPL-MCNC: 4.2 MMOL/L — SIGNIFICANT CHANGE UP (ref 3.5–5.3)
POTASSIUM SERPL-MCNC: 4.2 MMOL/L — SIGNIFICANT CHANGE UP (ref 3.5–5.3)
POTASSIUM SERPL-SCNC: 4.2 MMOL/L — SIGNIFICANT CHANGE UP (ref 3.5–5.3)
POTASSIUM SERPL-SCNC: 4.2 MMOL/L — SIGNIFICANT CHANGE UP (ref 3.5–5.3)
RBC # BLD: 3.3 M/UL — LOW (ref 3.8–5.2)
RBC # BLD: 3.44 M/UL — LOW (ref 3.8–5.2)
RBC # FLD: 18.3 % — HIGH (ref 10.3–14.5)
RBC # FLD: 18.5 % — HIGH (ref 10.3–14.5)
SAO2 % BLDA: 92.4 % — LOW (ref 95–99)
SAO2 % BLDA: 99.2 % — HIGH (ref 95–99)
SODIUM BLDA-SCNC: 127 MMOL/L — LOW (ref 136–146)
SODIUM BLDA-SCNC: 128 MMOL/L — LOW (ref 136–146)
SODIUM SERPL-SCNC: 132 MMOL/L — LOW (ref 135–145)
SODIUM SERPL-SCNC: 132 MMOL/L — LOW (ref 135–145)
VANCOMYCIN FLD-MCNC: 18.5 UG/ML — SIGNIFICANT CHANGE UP
WBC # BLD: 13.68 K/UL — HIGH (ref 3.8–10.5)
WBC # BLD: 17.82 K/UL — HIGH (ref 3.8–10.5)
WBC # FLD AUTO: 13.68 K/UL — HIGH (ref 3.8–10.5)
WBC # FLD AUTO: 17.82 K/UL — HIGH (ref 3.8–10.5)

## 2019-01-24 PROCEDURE — 99232 SBSQ HOSP IP/OBS MODERATE 35: CPT | Mod: GC

## 2019-01-24 PROCEDURE — 99292 CRITICAL CARE ADDL 30 MIN: CPT

## 2019-01-24 PROCEDURE — 71045 X-RAY EXAM CHEST 1 VIEW: CPT | Mod: 26

## 2019-01-24 PROCEDURE — 99291 CRITICAL CARE FIRST HOUR: CPT

## 2019-01-24 RX ORDER — ALBUMIN HUMAN 25 %
50 VIAL (ML) INTRAVENOUS ONCE
Qty: 0 | Refills: 0 | Status: COMPLETED | OUTPATIENT
Start: 2019-01-24 | End: 2019-01-24

## 2019-01-24 RX ORDER — PHYTONADIONE (VIT K1) 5 MG
5 TABLET ORAL ONCE
Qty: 0 | Refills: 0 | Status: COMPLETED | OUTPATIENT
Start: 2019-01-24 | End: 2019-01-24

## 2019-01-24 RX ORDER — ELECTROLYTE SOLUTION,INJ
1 VIAL (ML) INTRAVENOUS
Qty: 0 | Refills: 0 | Status: DISCONTINUED | OUTPATIENT
Start: 2019-01-24 | End: 2019-01-24

## 2019-01-24 RX ORDER — MORPHINE SULFATE 50 MG/1
2 CAPSULE, EXTENDED RELEASE ORAL ONCE
Qty: 0 | Refills: 0 | Status: DISCONTINUED | OUTPATIENT
Start: 2019-01-24 | End: 2019-01-24

## 2019-01-24 RX ORDER — I.V. FAT EMULSION 20 G/100ML
6.25 EMULSION INTRAVENOUS
Qty: 15 | Refills: 0 | Status: DISCONTINUED | OUTPATIENT
Start: 2019-01-24 | End: 2019-01-27

## 2019-01-24 RX ORDER — DEXMEDETOMIDINE HYDROCHLORIDE IN 0.9% SODIUM CHLORIDE 4 UG/ML
0.5 INJECTION INTRAVENOUS
Qty: 200 | Refills: 0 | Status: DISCONTINUED | OUTPATIENT
Start: 2019-01-24 | End: 2019-01-29

## 2019-01-24 RX ORDER — ACETAMINOPHEN 500 MG
1000 TABLET ORAL ONCE
Qty: 0 | Refills: 0 | Status: COMPLETED | OUTPATIENT
Start: 2019-01-24 | End: 2019-01-24

## 2019-01-24 RX ORDER — FUROSEMIDE 40 MG
40 TABLET ORAL ONCE
Qty: 0 | Refills: 0 | Status: COMPLETED | OUTPATIENT
Start: 2019-01-24 | End: 2019-01-24

## 2019-01-24 RX ORDER — FENTANYL CITRATE 50 UG/ML
0.5 INJECTION INTRAVENOUS
Qty: 2500 | Refills: 0 | Status: DISCONTINUED | OUTPATIENT
Start: 2019-01-24 | End: 2019-01-27

## 2019-01-24 RX ORDER — MAGNESIUM SULFATE 500 MG/ML
2 VIAL (ML) INJECTION ONCE
Qty: 0 | Refills: 0 | Status: COMPLETED | OUTPATIENT
Start: 2019-01-24 | End: 2019-01-24

## 2019-01-24 RX ADMIN — MEROPENEM 100 MILLIGRAM(S): 1 INJECTION INTRAVENOUS at 05:24

## 2019-01-24 RX ADMIN — CHLORHEXIDINE GLUCONATE 15 MILLILITER(S): 213 SOLUTION TOPICAL at 18:20

## 2019-01-24 RX ADMIN — DEXMEDETOMIDINE HYDROCHLORIDE IN 0.9% SODIUM CHLORIDE 7.17 MICROGRAM(S)/KG/HR: 4 INJECTION INTRAVENOUS at 20:00

## 2019-01-24 RX ADMIN — Medication 50 MILLILITER(S): at 10:23

## 2019-01-24 RX ADMIN — MEROPENEM 100 MILLIGRAM(S): 1 INJECTION INTRAVENOUS at 15:13

## 2019-01-24 RX ADMIN — I.V. FAT EMULSION 6.25 ML/HR: 20 EMULSION INTRAVENOUS at 17:01

## 2019-01-24 RX ADMIN — Medication 101 MILLIGRAM(S): at 11:35

## 2019-01-24 RX ADMIN — CHLORHEXIDINE GLUCONATE 15 MILLILITER(S): 213 SOLUTION TOPICAL at 05:23

## 2019-01-24 RX ADMIN — Medication 40 MILLIGRAM(S): at 11:34

## 2019-01-24 RX ADMIN — FENTANYL CITRATE 2.87 MICROGRAM(S)/KG/HR: 50 INJECTION INTRAVENOUS at 07:54

## 2019-01-24 RX ADMIN — Medication 1 EACH: at 17:01

## 2019-01-24 RX ADMIN — FENTANYL CITRATE 2.87 MICROGRAM(S)/KG/HR: 50 INJECTION INTRAVENOUS at 15:19

## 2019-01-24 RX ADMIN — Medication 1: at 18:32

## 2019-01-24 RX ADMIN — MICAFUNGIN SODIUM 105 MILLIGRAM(S): 100 INJECTION, POWDER, LYOPHILIZED, FOR SOLUTION INTRAVENOUS at 21:28

## 2019-01-24 RX ADMIN — FENTANYL CITRATE 2.87 MICROGRAM(S)/KG/HR: 50 INJECTION INTRAVENOUS at 08:30

## 2019-01-24 RX ADMIN — FENTANYL CITRATE 2.87 MICROGRAM(S)/KG/HR: 50 INJECTION INTRAVENOUS at 18:21

## 2019-01-24 RX ADMIN — FENTANYL CITRATE 2.87 MICROGRAM(S)/KG/HR: 50 INJECTION INTRAVENOUS at 20:00

## 2019-01-24 RX ADMIN — ENOXAPARIN SODIUM 40 MILLIGRAM(S): 100 INJECTION SUBCUTANEOUS at 11:34

## 2019-01-24 RX ADMIN — DEXMEDETOMIDINE HYDROCHLORIDE IN 0.9% SODIUM CHLORIDE 7.17 MICROGRAM(S)/KG/HR: 4 INJECTION INTRAVENOUS at 07:54

## 2019-01-24 RX ADMIN — SODIUM CHLORIDE 75 MILLILITER(S): 9 INJECTION, SOLUTION INTRAVENOUS at 15:19

## 2019-01-24 RX ADMIN — Medication 1 DROP(S): at 05:23

## 2019-01-24 RX ADMIN — SODIUM CHLORIDE 75 MILLILITER(S): 9 INJECTION, SOLUTION INTRAVENOUS at 07:55

## 2019-01-24 RX ADMIN — Medication 400 MILLIGRAM(S): at 23:15

## 2019-01-24 RX ADMIN — PANTOPRAZOLE SODIUM 40 MILLIGRAM(S): 20 TABLET, DELAYED RELEASE ORAL at 11:34

## 2019-01-24 RX ADMIN — MEROPENEM 100 MILLIGRAM(S): 1 INJECTION INTRAVENOUS at 21:27

## 2019-01-24 RX ADMIN — Medication 1 DROP(S): at 18:21

## 2019-01-24 RX ADMIN — CHLORHEXIDINE GLUCONATE 1 APPLICATION(S): 213 SOLUTION TOPICAL at 15:13

## 2019-01-24 RX ADMIN — Medication 250 MILLIGRAM(S): at 05:24

## 2019-01-24 RX ADMIN — Medication 50 GRAM(S): at 02:34

## 2019-01-24 RX ADMIN — Medication 1: at 23:17

## 2019-01-24 RX ADMIN — DEXMEDETOMIDINE HYDROCHLORIDE IN 0.9% SODIUM CHLORIDE 7.17 MICROGRAM(S)/KG/HR: 4 INJECTION INTRAVENOUS at 08:30

## 2019-01-24 RX ADMIN — Medication 250 MILLIGRAM(S): at 18:20

## 2019-01-24 NOTE — CONSULT NOTE ADULT - ATTENDING COMMENTS
53 y.o. 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 Patient had acute onset of RUQ pain yesterday, with no inciting factors or relief with Aleve. The patient's family also endorsed 1-2 episodes of NBNB emesis yesterday and has had decreased PO intake over the last 36hours. As of diagnosis of cancer, the patient has not had follow up with oncology or surg-oncology due insurance issues.    x Severe protein calorie malnutrition in acute illness/ injury    Nutritional Requirements  Carbohydrates: Total grams / kG / day: _3.4__ Total Calories: ___612  Lipids: Total grams / kG / day: ___9 Total Calories: _262__  Proteins: Total grams / kG / day: _1.5__ Total Calories: __874_  Non-Protein Calorie to Nitrogen Ratio:      Plan:  [ x ] Initiate TPN formula:  Carbohydrates:__180____grams, Amino Acid:_69_____grams  Lipids:__30_____ grams, Total volume:__2000_____mL.  1. Dedicated Central line must be placed for TPN.  2. Strict Intake and Output.  3. Weights three times a week  4. Monitor BMP, Mg+, Ionized Ca++, Phosphorus daily  5. Monitor Triglycerides monthly  6. Pre-albumin weekly.  7. Fingersticks to monitor glucose every 6 hours until stable then may be decreased to twice a day.      [  ] Initiate Enteral Nutrition:  Total calories to be delivered over:_____ hours / day at a goal rate  of:_____mL / hr  1. Place nasogastric or nasojejunal feeding tube if not in place  2. Begin: ____________________ at 25 mL / hr  3. Increase rate by 25mL / hr every four hours until goal rate is achieved  4. Monitor residual volume every 4 hours. If residual volume is greater  than 75% of the infused 4 hour volume, hold feeds for 2 hours and  consider promotility agent if volume is still greater than 75%  5. Monitor BMP, Mg+, Ionized Ca++, Phosphorus daily  6. Monitor Pre-albumin weekly  7. Weights two times a week  I have seen and examined the patient, reviewed the laboratory and radiologic data and agree with the history, physical assessment and plan.  I reviewed and discussed with all consultants, house staff and PA's.  The note is edited where appropriate. The Nutrition Support Team (NST) discusses on an ongoing basis with the primary team and all consultants, House staff and PA's to have a permanent risk benefit analyses on all decisions.    I spent  45  minutes in total encounter; more than 50% of the visit counseling and coordinating nutrition care while providing diagnoses, assessment, and plan.

## 2019-01-24 NOTE — CHART NOTE - NSCHARTNOTEFT_GEN_A_CORE
NUTRITION FOLLOW-UP:    Pt seen for critical care nutrition follow/up.  Pt is currently intubated/ sedated.  Nutrition hx obtained from daughter.  Pt to start PN today - orders placed in chart.  Noted pt NPO/Clear liquids x12 days.  Pt w/NGT for gastric decompression w/250ml ouptut  x24h.  Pt s/p Or for evacuation of hematoma of abdomen, celiotomy and abdominal wall closure.  Pt also s/p Whipple procedure.  Pt w/hx of poor food intake PTA.  Noted wt increase of 17.5kg since admission - likely fluid related.  pt w/3+generalized edema.  Attempted to conduct NFPE - pt w/temporal wastage noted.  Pt meets critieria for severe malnutrition 2/2 prolonged suboptimal kcal intake, 3+ edema and some signs of muscle loss    Weight:  1/24 - 74.9kg     1/13 - 63.3kg     Adm - 57.4kg     UBW - 55.7kg  Labs:  H/H 9.5/28.8  Na 132  Glu 155  T.Ca 7.3    Current Diet:  PN to begin @42ml/h w/ 90gm Dextrose, 35 gm Amino Acids and 15gm SMOF  Parenteral Recommendations:  Estimated kcal needs = 20-25kcal/kg UBW =9065-6210 kcal/g.  Estimated protein needs = 1.5gm protein/kg = 83.5gm/d.  Suggest advance PN to meet kcal/protein needs.  Monitor electrolyte levels and replete as needed    RD to Remain Available:  yes    Additional Recommendations:   1) Monitor weights, labs, BM's, skin integrity, FS, tolerance to PN   2) Adjust PN as needed to meet current nutritional needs.

## 2019-01-24 NOTE — PROGRESS NOTE ADULT - ASSESSMENT
54y Female PMHx HTN, T2DM, asthma, depression with recent diagnosis of invasive ampullary adenocarcinoma of the CBD whom underwent an uneventful whipple 1/11/18. Patient has had an uptrending WBC since procedure, initially attributed to not restarting antibiotics for cholangitis, but presents today with abdominal tenderness/distention and tachycardia. Admitted to SICU for close hemodynamic monitoring, plan for RTOR today for abthera exchange v. abdominal wall closure    Neuro:  - fentanyl gtt for sedative analgesia, precedex for agitation   - daily sedation vacation  - monitor mental status     Resp:  - Continues to be hypoxemic, PaO2 > FiO2 ratio 233  - q8 ABG, adjust vent settings prn  - CPAP trial as tolerated if patient remains stable then consider extubation    CV:  - monitor SBP, now off vasopressors   - IVF resuscitation as needed   - pending ECHO     GI:  - NPO  - TPN consult   - Continue protonix    Heme:   -monitor H/H  -lovenox vte ppx    Renal:  - Strict I&O, monitor lytes, replete as needed  - D5 1/2NS @ 100  - urine output improved     ID:   - UA positive, will f/u urine cultures  - ct meropenem/vancomycin for broadened coverage, no intra op cultures sent  - blood culture 1/18 f/u- +coag negative staph  - f/u BCX from 1/19 and 1/20  - Will continue Abx for 3 days (1/26) and reassess clinically    Endo:  - diabetic, FS and low dose corrective regimen for hyperglycemia     Access: LIJ central line, 2 PIV, JOURDAN draining serosanguinous, ETT, R axillary arterial line     Dispo: SICU 54y Female PMHx HTN, T2DM, asthma, depression with recent diagnosis of invasive ampullary adenocarcinoma of the CBD whom underwent an uneventful whipple 1/11/18. Patient has had an uptrending WBC since procedure, initially attributed to not restarting antibiotics for cholangitis, but presents today with abdominal tenderness/distention and tachycardia. Admitted to SICU for close hemodynamic monitoring, plan for RTOR today for abthera exchange v. abdominal wall closure    Neuro:  - fentanyl gtt for sedative analgesia, precedex for agitation   - daily sedation vacation  - monitor mental status     Resp:  - Continues to be hypoxemic, PaO2 > FiO2 ratio 233  - q8 ABG, adjust vent settings prn  - CPAP trial as tolerated if patient remains stable then consider extubation  -Repeat ABGs after diuresis     CV:  - monitor SBP, now off vasopressors   - IVF resuscitation as needed   - pending ECHO     GI:  - TPN Lipids will start today  - Continue protonix    Heme:   -monitor H/H  -lovenox vte ppx  -Vitamin K for elevated INR  -Repeat coags tomorrow     Renal:  - Strict I&O, monitor lytes, replete as needed  - D5 1/2NS @ 100  - urine output still low 20-30cc/hr  -Lasix 40IVP   -Albumin 50cc 25%    ID:   - UA positive, will f/u urine cultures  - ct meropenem/vancomycin for broadened coverage, no intra op cultures sent  -Ct micafungin   - blood culture 1/18 f/u- +coag negative staph  - f/u BCX from 1/19 and 1/20  - Will continue Abx for 3 days (1/26) and reassess clinically     Endo:  - diabetic, FS and low dose corrective regimen for hyperglycemia     Access: LIJ central line, 2 PIV, JOURDAN draining serosanguinous, ETT, R axillary arterial line     Dispo: SICU    SICU Dx: Malnutrition,

## 2019-01-24 NOTE — PROGRESS NOTE ADULT - ASSESSMENT
54y Female PMHx HTN, T2DM, asthma, depression with recent diagnosis of invasive ampullary adenocarcinoma of the CBD whom underwent an uneventful whipple 1/11/18. Patient has had an uptrending WBC since procedure, initially attributed to not restarting antibiotics for cholangitis, but presents today with abdominal tenderness/distention and tachycardia. Admitted to SICU for close hemodynamic monitoring, plan for RTOR today for abthera exchange v. abdominal wall closure    Neuro:  - fentanyl gtt for sedative analgesia, precedex for agitation   - daily sedation vacation  - monitor mental status     Resp:  - Continues to be hypoxemic, PaO2 > FiO2 ratio 233  - q8 ABG, adjust vent settings prn   - CPAP trial as tolerated if patient remains stable then consider extubation  -Repeat ABGs after diuresis @5pm     CV:  - monitor SBP, now off vasopressors   - IVF resuscitation as needed   - pending ECHO     GI:  - TPN Lipids will start today  - Continue protonix    Heme:   -monitor H/H  -lovenox vte ppx  -Vitamin K for elevated INR  -Repeat coags tomorrow     Renal:  - Strict I&O, monitor lytes, replete as needed  - D5 1/2NS @ 100  - urine output still low 20-30cc/hr  -Lasix 40IVP   -Albumin 50cc 25%    ID:   - UA positive, will f/u urine cultures  - ct meropenem/vancomycin for broadened coverage, no intra op cultures sent  -Ct micafungin   - blood culture 1/18 f/u- +coag negative staph  - f/u BCX from 1/19 and 1/20  - Will continue Abx for 2 days (1/26) and reassess clinically   - Wound care: Wet to dry with saline soaked curlex, use skin barrier, cover with abdominal pads. Change dressing twice daily    Endo:  - diabetic, FS and low dose corrective regimen for hyperglycemia     Access: LIJ central line, 2 PIV, JOURDAN draining serosanguinous, ETT, R axillary arterial line     Dispo: SICU    SICU Dx: Severe calorie protein Malnutrition, respiratory failure 54y Female PMHx HTN, T2DM, asthma, depression with recent diagnosis of invasive ampullary adenocarcinoma of the CBD whom underwent an uneventful whipple 1/11/18. Patient has had an uptrending WBC since procedure, initially attributed to not restarting antibiotics for cholangitis, but presents today with abdominal tenderness/distention and tachycardia. Admitted to SICU for close hemodynamic monitoring, plan for RTOR today for abthera exchange v. abdominal wall closure    Neuro:  - fentanyl gtt for sedative analgesia, precedex for agitation   - daily sedation vacation  - monitor mental status     Resp:  - Continues to be hypoxemic, PaO2 > FiO2 ratio 233  - q8 ABG, adjust vent settings prn   - CPAP trial as tolerated if patient remains stable then consider extubation  -Repeat ABGs after diuresis @5pm     CV:  - monitor SBP, now off vasopressors   - IVF resuscitation as needed   - pending ECHO     GI:  - TPN Lipids will start today  - Continue protonix    Heme:   -monitor H/H  -lovenox vte ppx  -Vitamin K for elevated INR  -Repeat coags tomorrow     Renal:  - Strict I&O, monitor lytes, replete as needed  - D5 1/2NS @ 100- will discontinue as she is now getting TPN  - urine output still low 20-30cc/hr  -Lasix 40IVP   -Albumin 50cc 25%    ID:   - UA positive, will f/u urine cultures  - ct meropenem/vancomycin for broadened coverage, no intra op cultures sent  -Ct micafungin   - blood culture 1/18 f/u- +coag negative staph  - f/u BCX from 1/19 and 1/20  - Will continue Abx for 2 days (1/26) and reassess clinically   - Wound care: Wet to dry with saline soaked curlex, use skin barrier, cover with abdominal pads. Change dressing twice daily    Endo:  - diabetic, FS and low dose corrective regimen for hyperglycemia     Access: LIJ central line, 2 PIV, JOURDAN draining serosanguinous, ETT, R axillary arterial line     Dispo: SICU    SICU Dx: Severe calorie protein Malnutrition, respiratory failure

## 2019-01-24 NOTE — CONSULT NOTE ADULT - SUBJECTIVE AND OBJECTIVE BOX
HISTORY OF PRESENT ILLNESS:  54F with history of cholangitis found to have CBD adenocarcinoma s/p Whipple procedure (on 1/11/2019) c/b GI bleed s/p RTOR for ligation (on 1/18/2019) followed by washout and placement of Strattice mesh (1/21/2019)    PAST MEDICAL / SURGICAL HISTORY:  Adenocarcinoma of gallbladder  Type 2 diabetes mellitus without complication, without long-term current use of insulin  Neuropathy  Arthritis  Mild intermittent asthma without complication  Gastroesophageal reflux disease without esophagitis  Essential hypertension    INPATIENT MEDICATIONS:   artificial  tears Solution 1 Drop(s) Both EYES every 12 hours  chlorhexidine 0.12% Liquid 15 milliLiter(s) Oral Mucosa two times a day  chlorhexidine 4% Liquid 1 Application(s) Topical <User Schedule>  dexmedetomidine Infusion 0.5 MICROgram(s)/kG/Hr (7.175 mL/Hr) IV Continuous <Continuous>  dextrose 5% + sodium chloride 0.9%. 1000 milliLiter(s) (75 mL/Hr) IV Continuous <Continuous>  enoxaparin Injectable 40 milliGRAM(s) SubCutaneous daily  fat emulsion (Fish Oil and Plant Based) 20% Infusion 6.25 mL/Hr (6.25 mL/Hr) IV Continuous <Continuous>  fentaNYL   Infusion. 0.5 MICROgram(s)/kG/Hr (2.87 mL/Hr) IV Continuous <Continuous>  insulin lispro (HumaLOG) corrective regimen sliding scale   SubCutaneous every 6 hours  meropenem  IVPB 1000 milliGRAM(s) IV Intermittent every 8 hours  meropenem  IVPB      micafungin IVPB      micafungin IVPB 100 milliGRAM(s) IV Intermittent every 24 hours  pantoprazole  Injectable 40 milliGRAM(s) IV Push daily  Parenteral Nutrition - Adult 1 Each (42 mL/Hr) TPN Continuous <Continuous>  vancomycin  IVPB      vancomycin  IVPB 1000 milliGRAM(s) IV Intermittent every 12 hours    ALLERGIES:   No Known Drug Allergies     SOCIAL HISTORY:  The patient denies alcohol use.  The patient denies smoking.  The patient denies recreational drug use.    REVIEW OF SYSTEMS:  Unable to assess due to intubation and sedation.     PHYSICAL EXAM:  Sedated on Precedex and Fentanul.  Intubated.   Open abodominal wound measuring approximately 20cm in length and 8cm in width. 4cm of undermining along the entire right lateral aspect of wound. 2cm undermining along the entire left lateral aspect of wound. Strattice visible at base of wound. Small amount of granulation tissue and fibrinous exudate present on wound edges. No odor. No drainage. Moderate abdominal skin laxity bilaterally.    LABS:               9.5    17.82  )----------(  156       ( 24 Jan 2019 04:20 )               28.8      132    |  100    |  9      ----------------------------<  155        ( 24 Jan 2019 04:20 )  4.2     |  21     |  0.52     Ca    7.3        ( 24 Jan 2019 04:20 )  Phos  2.6       ( 24 Jan 2019 04:20 )  Mg     2.3       ( 24 Jan 2019 04:20 )      IMAGING STUDIES:   No relevant studies available at this time.

## 2019-01-24 NOTE — CHART NOTE - NSCHARTNOTEFT_GEN_A_CORE
NUTRITION SERVICES     Upon Nutritional Assessment by the Registered Dietitian your patient was determined to meet criteria/ has evidence of the following diagnosis/diagnoses:  [ ] Mild Protein Calorie Malnutrition   [ ] Moderate Protein Calorie Malnutrition   [X ] Severe Protein Calorie Malnutrition   [ ] Unspecified Protein Calorie Malnutrition   [ ] Underweight / BMI <19  [ ] Morbid Obesity / BMI >40    Findings as based on:  •  Comprehensive nutrition assessment and consultation NUTRITION SERVICES     Upon Nutritional Assessment by the Registered Dietitian your patient was determined to meet criteria/ has evidence of the following diagnosis/diagnoses:  [ ] Mild Protein Calorie Malnutrition   [ ] Moderate Protein Calorie Malnutrition   [X ] Severe Protein Calorie Malnutrition   [ ] Unspecified Protein Calorie Malnutrition   [ ] Underweight / BMI <19  [ ] Morbid Obesity / BMI >40    Findings as based on:  •  Comprehensive nutrition assessment and consultation  Agree

## 2019-01-24 NOTE — CONSULT NOTE ADULT - ASSESSMENT
54F with history of cholangitis found to have CBD adenocarcinoma s/p Whipple procedure (on 1/11/2019) c/b GI bleed s/p RTOR for ligation (on 1/18/2019) followed by washout and placement of Strattice mesh (1/21/2019). Now with open abdominal wound.  >> External tissue expander device (Dermaclose) applied to the wound after obtaining informed consent from the patient's daughter. The device will automatically tighten (on its own) without external intervention. This device will help hasten definitive closure of the abdominal wound.   >> Continue local wound care with BID normal-saline moistened gauze between external tissue expander wires.  >> Continue abdominal binder.  >> Nutritional optimization per SICU / Nutrition (per SICU, starting TPN today).  >> Medical optimization per SICU.  >> Antibiotics per SICU.  >> Vent / Intubation per SICU.  >> The patient has been discussed with Dr. Wood Hudson who agrees with the above plan.    [facility] Plastic Surgery Pager -- [phone]  [facility] Plastic Surgery Pager -- [phone]  [facility] Plastic Surgery Pager -- [phone]

## 2019-01-24 NOTE — CONSULT NOTE ADULT - SUBJECTIVE AND OBJECTIVE BOX
Nutrition Support Consult Note    HPI:  53 y.o. 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 Patient had acute onset of RUQ pain yesterday, with no inciting factors or relief with Aleve. The patient's family also endorsed 1-2 episodes of NBNB emesis yesterday and has had decreased PO intake over the last 36hours. As of diagnosis of cancer, the patient has not had follow up with oncology or surg-oncology due insurance issues. The patient has an appointment with [facility] on Wednesday for surgical evaluation. (05 Jan 2019 15:06)    MEDICATIONS  (STANDING):  artificial  tears Solution 1 Drop(s) Both EYES every 12 hours  chlorhexidine 0.12% Liquid 15 milliLiter(s) Oral Mucosa two times a day  chlorhexidine 4% Liquid 1 Application(s) Topical <User Schedule>  dexmedetomidine Infusion 0.5 MICROgram(s)/kG/Hr (7.175 mL/Hr) IV Continuous <Continuous>  dextrose 5% + sodium chloride 0.9%. 1000 milliLiter(s) (75 mL/Hr) IV Continuous <Continuous>  enoxaparin Injectable 40 milliGRAM(s) SubCutaneous daily  fat emulsion (Fish Oil and Plant Based) 20% Infusion 6.25 mL/Hr (6.25 mL/Hr) IV Continuous <Continuous>  fentaNYL   Infusion. 0.5 MICROgram(s)/kG/Hr (2.87 mL/Hr) IV Continuous <Continuous>  insulin lispro (HumaLOG) corrective regimen sliding scale   SubCutaneous every 6 hours  meropenem  IVPB 1000 milliGRAM(s) IV Intermittent every 8 hours  meropenem  IVPB      micafungin IVPB      micafungin IVPB 100 milliGRAM(s) IV Intermittent every 24 hours  pantoprazole  Injectable 40 milliGRAM(s) IV Push daily  Parenteral Nutrition - Adult 1 Each (42 mL/Hr) TPN Continuous <Continuous>  vancomycin  IVPB      vancomycin  IVPB 1000 milliGRAM(s) IV Intermittent every 12 hours    MEDICATIONS  (PRN):      PAST MEDICAL & SURGICAL HISTORY:  Adenocarcinoma of gallbladder  Type 2 diabetes mellitus without complication, without long-term current use of insulin  Neuropathy  Arthritis  Mild intermittent asthma without complication  Gastroesophageal reflux disease without esophagitis  Essential hypertension  No significant past surgical history      FAMILY HISTORY:  No pertinent family history in first degree relatives    EXAM  NEUROLOGY  RASS: -3 to -4  Exam: sedated, minimally arousable.  Awakens but agitated when off sedation    HEENT  Exam: Normocephalic, atraumatic, EOMI.     RESPIRATORY  Exam: Lungs clear to auscultation, Normal expansion/effort.  Mechanical Ventilation: Mode: AC/ CMV (Assist Control/ Continuous Mandatory Ventilation), RR (machine): 14, TV (machine): 350, FiO2: 40, PEEP: 5, MAP: 6, PIP: 20    CARDIOVASCULAR  Exam: S1, S2.  Regular rate and rhythm. 3+ edema     GI/NUTRITION  Exam: Mildly distended, midline incision open with wet to dry dressing  Diet: NPO    VASCULAR  Exam: Extremities warm, pink, well-perfused.    MUSCULOSKELETAL  Exam: All extremities moving spontaneously without limitations. *    SKIN  Exam: Good skin turgor, no skin breakdown.     ICU Vital Signs Last 24 Hrs  T(C): 37.8 (24 Jan 2019 12:00), Max: 37.8 (24 Jan 2019 12:00)  T(F): 100 (24 Jan 2019 12:00), Max: 100 (24 Jan 2019 12:00)  HR: 71 (24 Jan 2019 12:00) (64 - 94)  BP: 123/68 (24 Jan 2019 07:00) (98/50 - 172/86)  BP(mean): 82 (24 Jan 2019 07:00) (61 - 107)  ABP: 116/63 (24 Jan 2019 12:00) (88/51 - 200/100)  ABP(mean): 84 (24 Jan 2019 12:00) (66 - 144)  RR: 14 (24 Jan 2019 12:00) (0 - 32)  SpO2: 97% (24 Jan 2019 12:00) (93% - 100%)    I&O's Detail    23 Jan 2019 07:01  -  24 Jan 2019 07:00  --------------------------------------------------------  IN:    dexmedetomidine Infusion: 130.2 mL    dextrose 5% + sodium chloride 0.45%.: 800 mL    dextrose 5% + sodium chloride 0.9%.: 1400 mL    fentaNYL  Infusion: 57.1 mL    fentaNYL Infusion.: 77.2 mL    IV PiggyBack: 650 mL    Sodium Chloride 0.9% IV Bolus: 1000 mL  Total IN: 4114.5 mL    OUT:    Bulb: 20 mL    Bulb: 50 mL    Indwelling Catheter - Urethral: 555 mL    Nasoenteral Tube: 250 mL  Total OUT: 875 mL    Total NET: 3239.5 mL      24 Jan 2019 07:01  -  24 Jan 2019 12:21  --------------------------------------------------------  IN:    Albumin 25%: 50 mL    dexmedetomidine Infusion: 7.2 mL    dexmedetomidine Infusion: 7.2 mL    dextrose 5% + sodium chloride 0.9%.: 300 mL    fentaNYL Infusion.: 34.5 mL    fentaNYL Infusion.: 11.5 mL    IV PiggyBack: 50 mL  Total IN: 460.4 mL    OUT:    Bulb: 20 mL    Bulb: 70 mL    Indwelling Catheter - Urethral: 135 mL  Total OUT: 225 mL    Total NET: 235.4 mL        Mode: CPAP with PS  FiO2: 50  PEEP: 10  PS: 13      PHYSICAL EXAM:    EXAM  NEUROLOGY  RASS: -3 to -4  Exam: sedated, minimally arousable.  Awakens but agitated when off sedation    HEENT  Exam: Normocephalic, atraumatic, EOMI.     RESPIRATORY  Exam: Lungs clear to auscultation, Normal expansion/effort.  Mechanical Ventilation: Mode: AC/ CMV (Assist Control/ Continuous Mandatory Ventilation), RR (machine): 14, TV (machine): 350, FiO2: 40, PEEP: 5, MAP: 6, PIP: 20    CARDIOVASCULAR  Exam: S1, S2.  Regular rate and rhythm. 3+ edema     GI/NUTRITION  Exam: Mildly distended, midline incision open with wet to dry dressing  Diet: NPO    VASCULAR  Exam: Extremities warm, pink, well-perfused.    MUSCULOSKELETAL  Exam: All extremities moving spontaneously without limitations. *    SKIN  Exam: Good skin turgor, no skin breakdown.       LABS:  CBC Full  -  ( 24 Jan 2019 04:20 )  WBC Count : 17.82 K/uL  Hemoglobin : 9.5 g/dL  Hematocrit : 28.8 %  Platelet Count - Automated : 156 K/uL  Mean Cell Volume : 83.7 fL  Mean Cell Hemoglobin : 27.6 pg  Mean Cell Hemoglobin Concentration : 33.0 %  Auto Neutrophil # : 11.47 K/uL  Auto Lymphocyte # : 2.74 K/uL  Auto Monocyte # : 1.79 K/uL  Auto Eosinophil # : 0.48 K/uL  Auto Basophil # : 0.08 K/uL  Auto Neutrophil % : 64.4 %  Auto Lymphocyte % : 15.4 %  Auto Monocyte % : 10.0 %  Auto Eosinophil % : 2.7 %  Auto Basophil % : 0.4 %    01-24    132<L>  |  100  |  9   ----------------------------<  155<H>  4.2   |  21<L>  |  0.52    Ca    7.3<L>      24 Jan 2019 04:20  Phos  2.6     01-24  Mg     2.3     01-24    TPro  5.0<L>  /  Alb  2.1<L>  /  TBili  3.9<H>  /  DBili  3.1<H>  /  AST  73<H>  /  ALT  23  /  AlkPhos  199<H>  01-22    CAPILLARY BLOOD GLUCOSE      POCT Blood Glucose.: 138 mg/dL (24 Jan 2019 11:49)    PT/INR - ( 23 Jan 2019 15:54 )   PT: 18.2 SEC;   INR: 1.62          PTT - ( 23 Jan 2019 15:54 )  PTT:33.0 SEC  ABG - ( 24 Jan 2019 04:20 )  pH, Arterial: 7.42  pH, Blood: x     /  pCO2: 37    /  pO2: 62    / HCO3: 24    / Base Excess: -0.2  /  SaO2: 92.4        Pre-Illness Weight: _____ kG  Weight [  ]loss /[  ]gain: kG weeks / months  Current Weight: kG  Height: cm  Ideal Body Weight: kG   BMI:  Current Diet: [  ]NPO  Appetite: [  ]Poor [  ]Adequate [  ]Good      CLINICAL INDICATORS  MALNUTRITION IN CONTEXT OF ACUTE ILLNESS OR INJURY  SEVERE MALNUTRITION  Weight Loss  [  ] >2% in 1 week  [ x ] >5% in 1 month  [  ] >7.5% in 3 months    Caloric Intake  [ x ] <50% of nutirition needs >= 5 days    Generlized Edema  Severe Edema    MODERATE MALNUTRITION  Weight Loss  [  ] >1-2% in 1 week  [  ] >5% in 1 month  [  ] >7.5% in 3 months    Caloric Intake  [  ] <75% of nutirition needs >= 5 days    Generlized Edema  Mild Edema      MALNUTRITION IN CONTEXT OF CHRONIC ILLNESS  SEVERE MALNUTRITION  Weight Loss  [  ] >5% in 1 month  [  ] >7.5% in 3 month  [  ] >10% in 6 months  [  ] >20% in 1 years    Caloric Intake  [  ] <50% of nutirition needs >= 1 month    Generlized Edema  Severe Edema    MODERATE MALNUTRITION  Weight Loss  [  ] >5% in 1 month  [  ] >7.5% in 3 month  [  ] >10% in 6 months  [  ] >20% in 1 years    Caloric Intake  [  ] <75% of nutirition needs >= 1 month    Generlized Edema  Mild Edema    Metabolic Requirements:  The patient will require:  ____25_________ kilocalories / kg / day  Diagnosis:  [  ] Mild protein malnutrition  [  ] Moderate protein calorie malnutrition in acute illness/ injury  [ x ] Severe protein calorie malnutrition in acute illness/ injury  [  ] Moderate protein calorie malnutrition in chronic illness  [  ] Severe protein calorie malnutrition in chronic illness      Nutritional Requirements  Carbohydrates: Total grams / kG / day: _3.4__ Total Calories: ___612  Lipids: Total grams / kG / day: ___9 Total Calories: _262__  Proteins: Total grams / kG / day: _1.5__ Total Calories: __874_  Non-Protein Calorie to Nitrogen Ratio:      Plan:  [ x ] Initiate TPN formula:  Carbohydrates:__180____grams, Amino Acid:_69_____grams  Lipids:__30_____ grams, Total volume:__2000_____mL.  1. Dedicated Central line must be placed for TPN.  2. Strict Intake and Output.  3. Weights three times a week  4. Monitor BMP, Mg+, Ionized Ca++, Phosphorus daily  5. Monitor Triglycerides monthly  6. Pre-albumin weekly.  7. Fingersticks to monitor glucose every 6 hours until stable then may be decreased to twice a day.      [  ] Initiate Enteral Nutrition:  Total calories to be delivered over:_____ hours / day at a goal rate  of:_____mL / hr  1. Place nasogastric or nasojejunal feeding tube if not in place  2. Begin: ____________________ at 25 mL / hr  3. Increase rate by 25mL / hr every four hours until goal rate is achieved  4. Monitor residual volume every 4 hours. If residual volume is greater  than 75% of the infused 4 hour volume, hold feeds for 2 hours and  consider promotility agent if volume is still greater than 75%  5. Monitor BMP, Mg+, Ionized Ca++, Phosphorus daily  6. Monitor Pre-albumin weekly  7. Weights two times a week    [  ] I have seen and examined the patient, reviewed the laboratory and radiographic data and agree with practitioner’s history, physical assessment, plan and  reviewed and edited where appropriate.

## 2019-01-24 NOTE — PROGRESS NOTE ADULT - ATTENDING COMMENTS
54y Female PMHx HTN, T2DM, asthma, depression with recent diagnosis of invasive ampullary adenocarcinoma of the CBD whom underwent an uneventful whipple 1/11/18. Patient has had an uptrending WBC since procedure, initially attributed to not restarting antibiotics for cholangitis, but presents today with abdominal tenderness/distention and tachycardia. Admitted to SICU for close hemodynamic monitoring, plan for RTOR today for abthera exchange v. abdominal wall closure    Neuro:  - fentanyl gtt for sedative analgesia, precedex for agitation   - daily sedation vacation  - monitor mental status     Resp:  - Continues to be hypoxemic, PaO2 > FiO2 ratio 233  - q8 ABG, adjust vent settings prn   - CPAP trial as tolerated if patient remains stable then consider extubation  -Repeat ABGs after diuresis @5pm     CV:  - monitor SBP, now off vasopressors   - IVF resuscitation as needed   - pending ECHO     GI:  - TPN Lipids will start today  - Continue protonix    Heme:   -monitor H/H  -lovenox vte ppx  -Vitamin K for elevated INR  -Repeat coags tomorrow     Renal:  - Strict I&O, monitor lytes, replete as needed  - D5 1/2NS @ 100- will discontinue as she is now getting TPN  - urine output still low 20-30cc/hr  -Lasix 40IVP   -Albumin 50cc 25%    ID:   - UA positive, will f/u urine cultures  - ct meropenem/vancomycin for broadened coverage, no intra op cultures sent  -Ct micafungin   - blood culture 1/18 f/u- +coag negative staph  - f/u BCX from 1/19 and 1/20  - Will continue Abx for 2 days (1/26) and reassess clinically   - Wound care: Wet to dry with saline soaked curlex, use skin barrier, cover with abdominal pads. Change dressing twice daily    Endo:  - diabetic, FS and low dose corrective regimen for hyperglycemia     Access: LIJ central line, 2 PIV, JOURDAN draining serosanguinous, ETT, R axillary arterial line     Dispo: SICU    SICU Dx: Severe calorie protein Malnutrition, respiratory failure  The patient is a critical care patient with life threatening hemodynamic and metabolic instability in SICU.  I have personally interviewed when possible and examined the patient, reviewed data and laboratory tests/x-rays and all pertinent electronic images.  I was physically present for the key portions of the evaluation and management (E/M) service provided.   The SICU team has a constant risk benefit analyzes discussion with the primary team, all consultants, House Staff and PA's on all decisions.  These diagnoses are unrelated to the surgical procedure noted above.  I meet with family if needed to get further history, discuss the case and make care decisions for this patient who might not be able to participate.  Time involved in performance of separately billable procedures was not counted toward my critical care time. There is no overlap.  I spent 55-75 minutes of critical care time for the diagnoses, assessment, plan and interventions. no

## 2019-01-24 NOTE — PROGRESS NOTE ADULT - ATTENDING COMMENTS
54y Female PMHx HTN, T2DM, asthma, depression with recent diagnosis of invasive ampullary adenocarcinoma of the CBD whom underwent an uneventful whipple 1/11/18. Patient has had an uptrending WBC since procedure, initially attributed to not restarting antibiotics for cholangitis, but presents today with abdominal tenderness/distention and tachycardia. Admitted to SICU for close hemodynamic monitoring, plan for RTOR today for abthera exchange v. abdominal wall closure    Neuro:  - fentanyl gtt for sedative analgesia, precedex for agitation   - daily sedation vacation  - monitor mental status     Resp:  - Continues to be hypoxemic, PaO2 > FiO2 ratio 233  - q8 ABG, adjust vent settings prn  - CPAP trial as tolerated if patient remains stable then consider extubation  -Repeat ABGs after diuresis     CV:  - monitor SBP, now off vasopressors   - IVF resuscitation as needed   - pending ECHO     GI:  - TPN Lipids will start today  - Continue protonix    Heme:   -monitor H/H  -lovenox vte ppx  -Vitamin K for elevated INR  -Repeat coags tomorrow     Renal:  - Strict I&O, monitor lytes, replete as needed  - D5 1/2NS @ 100  - urine output still low 20-30cc/hr  -Lasix 40IVP   -Albumin 50cc 25%    ID:   - UA positive, will f/u urine cultures  - ct meropenem/vancomycin for broadened coverage, no intra op cultures sent  -Ct micafungin   - blood culture 1/18 f/u- +coag negative staph  - f/u BCX from 1/19 and 1/20  - Will continue Abx for 3 days (1/26) and reassess clinically     Endo:  - diabetic, FS and low dose corrective regimen for hyperglycemia     Access: LIJ central line, 2 PIV, JOURDAN draining serosanguinous, ETT, R axillary arterial line     Dispo: SICU    SICU Dx: Severe calorie protein Malnutrition, respiratory failure,   The patient is a critical care patient with life threatening hemodynamic and metabolic instability in SICU.  I have personally interviewed when possible and examined the patient, reviewed data and laboratory tests/x-rays and all pertinent electronic images.  I was physically present for the key portions of the evaluation and management (E/M) service provided.   The SICU team has a constant risk benefit analyzes discussion with the primary team, all consultants, House Staff and PA's on all decisions.  These diagnoses are unrelated to the surgical procedure noted above.  I meet with family if needed to get further history, discuss the case and make care decisions for this patient who might not be able to participate.  Time involved in performance of separately billable procedures was not counted toward my critical care time. There is no overlap.  I spent 55-75 minutes of critical care time for the diagnoses, assessment, plan and interventions.

## 2019-01-24 NOTE — PROGRESS NOTE ADULT - SUBJECTIVE AND OBJECTIVE BOX
General Surgery Progress Note    SUBJECTIVE:  The patient was seen and examined. No acute events overnight.     OBJECTIVE:     ** VITAL SIGNS / I&O's **    Vital Signs Last 24 Hrs  T(C): 37.2 (23 Jan 2019 20:00), Max: 37.2 (23 Jan 2019 20:00)  T(F): 98.9 (23 Jan 2019 20:00), Max: 98.9 (23 Jan 2019 20:00)  HR: 69 (23 Jan 2019 23:00) (63 - 96)  BP: 172/86 (23 Jan 2019 22:00) (105/54 - 172/86)  BP(mean): 107 (23 Jan 2019 22:00) (66 - 107)  RR: 18 (23 Jan 2019 23:00) (0 - 32)  SpO2: 100% (23 Jan 2019 23:00) (91% - 100%)  Mode: AC/ CMV (Assist Control/ Continuous Mandatory Ventilation)  RR (machine): 14  TV (machine): 350  FiO2: 40  PEEP: 5  PS: 10  MAP: 6  PIP: 20      22 Jan 2019 07:01  -  23 Jan 2019 07:00  --------------------------------------------------------  IN:    dexmedetomidine Infusion: 37.3 mL    dextrose 5% + sodium chloride 0.45%.: 1900 mL    IV PiggyBack: 1500 mL    Lactated Ringers IV Bolus: 1000 mL    norepinephrine Infusion: 35.4 mL    sodium chloride 0.9%: 300 mL  Total IN: 4772.7 mL    OUT:    Bulb: 20 mL    Bulb: 95 mL    Bulb: 90 mL    Indwelling Catheter - Urethral: 1750 mL    Nasoenteral Tube: 250 mL  Total OUT: 2205 mL    Total NET: 2567.7 mL      23 Jan 2019 07:01  -  24 Jan 2019 00:06  --------------------------------------------------------  IN:    dexmedetomidine Infusion: 107.3 mL    dextrose 5% + sodium chloride 0.45%.: 800 mL    dextrose 5% + sodium chloride 0.9%.: 700 mL    fentaNYL  Infusion: 57.1 mL    fentaNYL Infusion.: 11.4 mL    IV PiggyBack: 300 mL    Sodium Chloride 0.9% IV Bolus: 1000 mL  Total IN: 2975.8 mL    OUT:    Bulb: 10 mL    Bulb: 10 mL    Indwelling Catheter - Urethral: 345 mL    Nasoenteral Tube: 150 mL  Total OUT: 515 mL    Total NET: 2460.8 mL          ** PHYSICAL EXAM **    -- CONSTITUTIONAL: Alert, NAD  -- PULMONARY: non-labored respirations  -- ABDOMEN: soft, non-distended, non-tender  -- NEURO: A&Ox3    ** LABS **                          9.7    17.23 )-----------( 153      ( 23 Jan 2019 22:55 )             29.1     23 Jan 2019 15:54    131    |  100    |  9      ----------------------------<  143    4.0     |  22     |  0.43     Ca    7.3        23 Jan 2019 15:54  Phos  2.4       23 Jan 2019 15:54  Mg     1.8       23 Jan 2019 15:54    TPro  5.0    /  Alb  2.1    /  TBili  3.9    /  DBili  3.1    /  AST  73     /  ALT  23     /  AlkPhos  199    22 Jan 2019 21:15    PT/INR - ( 23 Jan 2019 15:54 )   PT: 18.2 SEC;   INR: 1.62          PTT - ( 23 Jan 2019 15:54 )  PTT:33.0 SEC  CAPILLARY BLOOD GLUCOSE      POCT Blood Glucose.: 143 mg/dL (23 Jan 2019 17:09)  POCT Blood Glucose.: 140 mg/dL (23 Jan 2019 11:34)  POCT Blood Glucose.: 251 mg/dL (23 Jan 2019 06:26)        LIVER FUNCTIONS - ( 22 Jan 2019 21:15 )  Alb: 2.1 g/dL / Pro: 5.0 g/dL / ALK PHOS: 199 u/L / ALT: 23 u/L / AST: 73 u/L / GGT: x             Culture - Urine (collected 21 Jan 2019 09:31)  Source: URINE CATHETER  Final Report (22 Jan 2019 14:53):    CALB^Candida albicans    COLONY COUNT: > = 100,000 CFU/ML    Culture - Blood (collected 21 Jan 2019 02:28)  Source: BLOOD  Preliminary Report (23 Jan 2019 02:34):    NO ORGANISMS ISOLATED    NO ORGANISMS ISOLATED AT 48 HRS.          MEDICATIONS  (STANDING):  artificial  tears Solution 1 Drop(s) Both EYES every 12 hours  chlorhexidine 0.12% Liquid 15 milliLiter(s) Oral Mucosa two times a day  chlorhexidine 4% Liquid 1 Application(s) Topical <User Schedule>  dexmedetomidine Infusion 0.5 MICROgram(s)/kG/Hr (7.175 mL/Hr) IV Continuous <Continuous>  dextrose 5% + sodium chloride 0.9%. 1000 milliLiter(s) (75 mL/Hr) IV Continuous <Continuous>  enoxaparin Injectable 40 milliGRAM(s) SubCutaneous daily  fentaNYL   Infusion. 0.5 MICROgram(s)/kG/Hr (2.87 mL/Hr) IV Continuous <Continuous>  insulin lispro (HumaLOG) corrective regimen sliding scale   SubCutaneous every 6 hours  meropenem  IVPB 1000 milliGRAM(s) IV Intermittent every 8 hours  meropenem  IVPB      micafungin IVPB      micafungin IVPB 100 milliGRAM(s) IV Intermittent every 24 hours  pantoprazole  Injectable 40 milliGRAM(s) IV Push daily  vancomycin  IVPB      vancomycin  IVPB 1000 milliGRAM(s) IV Intermittent every 12 hours    MEDICATIONS  (PRN): General Surgery Progress Note    SUBJECTIVE:  The patient was seen and examined. No acute events overnight. Pain well controlled. Yesterday, hematoma was evacuated w/ 1u PRBC given w/ appropriate response. Was on precedex and fentanyl but off pressors during the day yesterday. intubated.    OBJECTIVE:     ** VITAL SIGNS / I&O's **    Vital Signs Last 24 Hrs  T(C): 37.2 (23 Jan 2019 20:00), Max: 37.2 (23 Jan 2019 20:00)  T(F): 98.9 (23 Jan 2019 20:00), Max: 98.9 (23 Jan 2019 20:00)  HR: 69 (23 Jan 2019 23:00) (63 - 96)  BP: 172/86 (23 Jan 2019 22:00) (105/54 - 172/86)  BP(mean): 107 (23 Jan 2019 22:00) (66 - 107)  RR: 18 (23 Jan 2019 23:00) (0 - 32)  SpO2: 100% (23 Jan 2019 23:00) (91% - 100%)  Mode: AC/ CMV (Assist Control/ Continuous Mandatory Ventilation)  RR (machine): 14  TV (machine): 350  FiO2: 40  PEEP: 5  PS: 10  MAP: 6  PIP: 20      22 Jan 2019 07:01  -  23 Jan 2019 07:00  --------------------------------------------------------  IN:    dexmedetomidine Infusion: 37.3 mL    dextrose 5% + sodium chloride 0.45%.: 1900 mL    IV PiggyBack: 1500 mL    Lactated Ringers IV Bolus: 1000 mL    norepinephrine Infusion: 35.4 mL    sodium chloride 0.9%: 300 mL  Total IN: 4772.7 mL    OUT:    Bulb: 20 mL    Bulb: 95 mL    Bulb: 90 mL    Indwelling Catheter - Urethral: 1750 mL    Nasoenteral Tube: 250 mL  Total OUT: 2205 mL    Total NET: 2567.7 mL      23 Jan 2019 07:01  -  24 Jan 2019 00:06  --------------------------------------------------------  IN:    dexmedetomidine Infusion: 107.3 mL    dextrose 5% + sodium chloride 0.45%.: 800 mL    dextrose 5% + sodium chloride 0.9%.: 700 mL    fentaNYL  Infusion: 57.1 mL    fentaNYL Infusion.: 11.4 mL    IV PiggyBack: 300 mL    Sodium Chloride 0.9% IV Bolus: 1000 mL  Total IN: 2975.8 mL    OUT:    Bulb: 10 mL    Bulb: 10 mL    Indwelling Catheter - Urethral: 345 mL    Nasoenteral Tube: 150 mL  Total OUT: 515 mL    Total NET: 2460.8 mL          ** PHYSICAL EXAM **    -- CONSTITUTIONAL: NAD  -- PULMONARY: non-labored respirations, intubated  -- ABDOMEN: soft, mildly distended; midline dressing intact; abdominal binder in place; RLQ, LLQ, and RUQ JPs ss.    ** LABS **                          9.7    17.23 )-----------( 153      ( 23 Jan 2019 22:55 )             29.1     23 Jan 2019 15:54    131    |  100    |  9      ----------------------------<  143    4.0     |  22     |  0.43     Ca    7.3        23 Jan 2019 15:54  Phos  2.4       23 Jan 2019 15:54  Mg     1.8       23 Jan 2019 15:54    TPro  5.0    /  Alb  2.1    /  TBili  3.9    /  DBili  3.1    /  AST  73     /  ALT  23     /  AlkPhos  199    22 Jan 2019 21:15    PT/INR - ( 23 Jan 2019 15:54 )   PT: 18.2 SEC;   INR: 1.62          PTT - ( 23 Jan 2019 15:54 )  PTT:33.0 SEC  CAPILLARY BLOOD GLUCOSE      POCT Blood Glucose.: 143 mg/dL (23 Jan 2019 17:09)  POCT Blood Glucose.: 140 mg/dL (23 Jan 2019 11:34)  POCT Blood Glucose.: 251 mg/dL (23 Jan 2019 06:26)        LIVER FUNCTIONS - ( 22 Jan 2019 21:15 )  Alb: 2.1 g/dL / Pro: 5.0 g/dL / ALK PHOS: 199 u/L / ALT: 23 u/L / AST: 73 u/L / GGT: x             Culture - Urine (collected 21 Jan 2019 09:31)  Source: URINE CATHETER  Final Report (22 Jan 2019 14:53):    CALB^Candida albicans    COLONY COUNT: > = 100,000 CFU/ML    Culture - Blood (collected 21 Jan 2019 02:28)  Source: BLOOD  Preliminary Report (23 Jan 2019 02:34):    NO ORGANISMS ISOLATED    NO ORGANISMS ISOLATED AT 48 HRS.          MEDICATIONS  (STANDING):  artificial  tears Solution 1 Drop(s) Both EYES every 12 hours  chlorhexidine 0.12% Liquid 15 milliLiter(s) Oral Mucosa two times a day  chlorhexidine 4% Liquid 1 Application(s) Topical <User Schedule>  dexmedetomidine Infusion 0.5 MICROgram(s)/kG/Hr (7.175 mL/Hr) IV Continuous <Continuous>  dextrose 5% + sodium chloride 0.9%. 1000 milliLiter(s) (75 mL/Hr) IV Continuous <Continuous>  enoxaparin Injectable 40 milliGRAM(s) SubCutaneous daily  fentaNYL   Infusion. 0.5 MICROgram(s)/kG/Hr (2.87 mL/Hr) IV Continuous <Continuous>  insulin lispro (HumaLOG) corrective regimen sliding scale   SubCutaneous every 6 hours  meropenem  IVPB 1000 milliGRAM(s) IV Intermittent every 8 hours  meropenem  IVPB      micafungin IVPB      micafungin IVPB 100 milliGRAM(s) IV Intermittent every 24 hours  pantoprazole  Injectable 40 milliGRAM(s) IV Push daily  vancomycin  IVPB      vancomycin  IVPB 1000 milliGRAM(s) IV Intermittent every 12 hours    MEDICATIONS  (PRN):

## 2019-01-24 NOTE — PROGRESS NOTE ADULT - SUBJECTIVE AND OBJECTIVE BOX
SICU Daily Progress Note  =====================================================  Interval/Overnight Events: New right axillary arterial line placed. Remains off vasopressors. Total of 10mg Vitamin K given for elevated INR, likely in the setting of malnutrition. Plan for TPN today.     54y Female PMHx HTN, T2DM, asthma, depression with recent diagnosis of invasive ampullary adenocarcinoma of the CBD whom underwent an uneventful whipple 1/11/18. Patient has had an uptrending WBC since procedure, initially attributed to not restarting antibiotics for cholangitis, but presents today with abdominal tenderness/distention and tachycardia. Admitted to SICU for close hemodynamic monitoring.    Had increasing abdominal distension, tachycardia and hypotension. Transferred back to SICU. Now s/p Celiotomy, evacuation of hematoma, cessation of gastroduodenal artery bleeding, and placement of Abthera vac 1/18. Patient RTOR 1/21/19 for closure of abdomen with stratice mesh. Reece drain placed at the pancreaticojejunostomy and left lower JOURDAN drain in SQ.    Allergies: No Known Allergies      MEDICATIONS:   --------------------------------------------------------------------------------------  Neurologic Medications  dexmedetomidine Infusion 0.5 MICROgram(s)/kG/Hr IV Continuous <Continuous>  fentaNYL   Infusion. 0.5 MICROgram(s)/kG/Hr IV Continuous <Continuous>    Respiratory Medications    Cardiovascular Medications    Gastrointestinal Medications  dextrose 5% + sodium chloride 0.9%. 1000 milliLiter(s) IV Continuous <Continuous>  magnesium sulfate  IVPB 2 Gram(s) IV Intermittent once  pantoprazole  Injectable 40 milliGRAM(s) IV Push daily    Genitourinary Medications    Hematologic/Oncologic Medications  enoxaparin Injectable 40 milliGRAM(s) SubCutaneous daily    Antimicrobial/Immunologic Medications  meropenem  IVPB 1000 milliGRAM(s) IV Intermittent every 8 hours  meropenem  IVPB      micafungin IVPB      micafungin IVPB 100 milliGRAM(s) IV Intermittent every 24 hours  vancomycin  IVPB      vancomycin  IVPB 1000 milliGRAM(s) IV Intermittent every 12 hours    Endocrine/Metabolic Medications  insulin lispro (HumaLOG) corrective regimen sliding scale   SubCutaneous every 6 hours    Topical/Other Medications  artificial  tears Solution 1 Drop(s) Both EYES every 12 hours  chlorhexidine 0.12% Liquid 15 milliLiter(s) Oral Mucosa two times a day  chlorhexidine 4% Liquid 1 Application(s) Topical <User Schedule>    --------------------------------------------------------------------------------------    VITAL SIGNS, INS/OUTS (last 24 hours):  --------------------------------------------------------------------------------------  ((Insert SICU Vitals/Is+Os here))***  --------------------------------------------------------------------------------------    EXAM  NEUROLOGY  RASS: -3 to -4  Exam: sedated, minimally arousable.  Awakens but agitated when off sedation    HEENT  Exam: Normocephalic, atraumatic, EOMI.     RESPIRATORY  Exam: Lungs clear to auscultation, Normal expansion/effort.  Mechanical Ventilation: Mode: AC/ CMV (Assist Control/ Continuous Mandatory Ventilation), RR (machine): 14, TV (machine): 350, FiO2: 40, PEEP: 5, MAP: 6, PIP: 20    CARDIOVASCULAR  Exam: S1, S2.  Regular rate and rhythm. 3+ edema     GI/NUTRITION  Exam: Mildly distended, midline incision open with wet to dry dressing  Diet: NPO    VASCULAR  Exam: Extremities warm, pink, well-perfused.    MUSCULOSKELETAL  Exam: All extremities moving spontaneously without limitations. *    SKIN  Exam: Good skin turgor, no skin breakdown.     METABOLIC/FLUIDS/ELECTROLYTES  dextrose 5% + sodium chloride 0.9%. 1000 milliLiter(s) IV Continuous <Continuous>  magnesium sulfate  IVPB 2 Gram(s) IV Intermittent once    HEMATOLOGIC  [x] VTE Prophylaxis: enoxaparin Injectable 40 milliGRAM(s) SubCutaneous daily    Transfusions:	[] PRBC	[] Platelets		[] FFP	[] Cryoprecipitate    INFECTIOUS DISEASE  Antimicrobials/Immunologic Medications:  meropenem  IVPB 1000 milliGRAM(s) IV Intermittent every 8 hours  meropenem  IVPB      micafungin IVPB      micafungin IVPB 100 milliGRAM(s) IV Intermittent every 24 hours  vancomycin  IVPB      vancomycin  IVPB 1000 milliGRAM(s) IV Intermittent every 12 hours    Day #      of     ***    Tubes/Lines/Drains  ***  [x] Peripheral IV  [] Central Venous Line     	[] R	[] L	[] IJ	[] Fem	[] SC	Date Placed:   [] Arterial Line		[] R	[] L	[] Fem	[] Rad	[] Ax	Date Placed:   [] PICC		[] Midline		[] Mediport  [] Urinary Catheter		Date Placed:   [x] Necessity of urinary, arterial, and venous catheters discussed    LABS  --------------------------------------------------------------------------------------  ((Insert SICU Labs here))***  --------------------------------------------------------------------------------------    OTHER LABORATORY:     IMAGING STUDIES:   CXR:     ASSESSMENT:  54y Female    ((insert from previous))***    PLAN:   ***  Neurologic:   Respiratory:   Cardiovascular:   Gastrointestinal/Nutrition:   Renal/Genitourinary:   Hematologic:   Infectious Disease:   Tubes/Lines/Drains:   Endocrine:   Disposition:     --------------------------------------------------------------------------------------    Critical Care Diagnoses: SICU Daily Progress Note  =====================================================  Interval/Overnight Events: New right axillary arterial line placed. Remains off vasopressors. Total of 10mg Vitamin K given for elevated INR, likely in the setting of malnutrition. Plan for TPN today.     54y Female PMHx HTN, T2DM, asthma, depression with recent diagnosis of invasive ampullary adenocarcinoma of the CBD whom underwent an uneventful whipple 1/11/18. Patient has had an uptrending WBC since procedure, initially attributed to not restarting antibiotics for cholangitis, but presents today with abdominal tenderness/distention and tachycardia. Admitted to SICU for close hemodynamic monitoring.    Had increasing abdominal distension, tachycardia and hypotension. Transferred back to SICU. Now s/p Celiotomy, evacuation of hematoma, cessation of gastroduodenal artery bleeding, and placement of Abthera vac 1/18. Patient RTOR 1/21/19 for closure of abdomen with stratice mesh. Reece drain placed at the pancreaticojejunostomy and left lower JOURDAN drain in SQ.    Allergies: No Known Allergies      MEDICATIONS:   --------------------------------------------------------------------------------------  Neurologic Medications  dexmedetomidine Infusion 0.5 MICROgram(s)/kG/Hr IV Continuous <Continuous>  fentaNYL   Infusion. 0.5 MICROgram(s)/kG/Hr IV Continuous <Continuous>    Respiratory Medications    Cardiovascular Medications    Gastrointestinal Medications  dextrose 5% + sodium chloride 0.9%. 1000 milliLiter(s) IV Continuous <Continuous>  magnesium sulfate  IVPB 2 Gram(s) IV Intermittent once  pantoprazole  Injectable 40 milliGRAM(s) IV Push daily    Genitourinary Medications    Hematologic/Oncologic Medications  enoxaparin Injectable 40 milliGRAM(s) SubCutaneous daily    Antimicrobial/Immunologic Medications  meropenem  IVPB 1000 milliGRAM(s) IV Intermittent every 8 hours  meropenem  IVPB      micafungin IVPB      micafungin IVPB 100 milliGRAM(s) IV Intermittent every 24 hours  vancomycin  IVPB      vancomycin  IVPB 1000 milliGRAM(s) IV Intermittent every 12 hours    Endocrine/Metabolic Medications  insulin lispro (HumaLOG) corrective regimen sliding scale   SubCutaneous every 6 hours    Topical/Other Medications  artificial  tears Solution 1 Drop(s) Both EYES every 12 hours  chlorhexidine 0.12% Liquid 15 milliLiter(s) Oral Mucosa two times a day  chlorhexidine 4% Liquid 1 Application(s) Topical <User Schedule>    --------------------------------------------------------------------------------------    VITAL SIGNS, INS/OUTS (last 24 hours):  --------------------------------------------------------------------------------------  ((Insert SICU Vitals/Is+Os here))***  --------------------------------------------------------------------------------------    EXAM  NEUROLOGY  RASS: -3 to -4  Exam: sedated, minimally arousable.  Awakens but agitated when off sedation    HEENT  Exam: Normocephalic, atraumatic, EOMI.     RESPIRATORY  Exam: Lungs clear to auscultation, Normal expansion/effort.  Mechanical Ventilation: Mode: AC/ CMV (Assist Control/ Continuous Mandatory Ventilation), RR (machine): 14, TV (machine): 350, FiO2: 40, PEEP: 5, MAP: 6, PIP: 20    CARDIOVASCULAR  Exam: S1, S2.  Regular rate and rhythm. 3+ edema     GI/NUTRITION  Exam: Mildly distended, midline incision open with wet to dry dressing  Diet: NPO    VASCULAR  Exam: Extremities warm, pink, well-perfused.    MUSCULOSKELETAL  Exam: All extremities moving spontaneously without limitations. *    SKIN  Exam: Good skin turgor, no skin breakdown.     METABOLIC/FLUIDS/ELECTROLYTES  dextrose 5% + sodium chloride 0.9%. 1000 milliLiter(s) IV Continuous <Continuous>  magnesium sulfate  IVPB 2 Gram(s) IV Intermittent once    HEMATOLOGIC  [x] VTE Prophylaxis: enoxaparin Injectable 40 milliGRAM(s) SubCutaneous daily    Transfusions:	[] PRBC	[] Platelets		[] FFP	[] Cryoprecipitate    INFECTIOUS DISEASE  Antimicrobials/Immunologic Medications:  meropenem  IVPB 1000 milliGRAM(s) IV Intermittent every 8 hours  meropenem  IVPB      micafungin IVPB      micafungin IVPB 100 milliGRAM(s) IV Intermittent every 24 hours  vancomycin  IVPB      vancomycin  IVPB 1000 milliGRAM(s) IV Intermittent every 12 hours    Day #      of     ***    Tubes/Lines/Drains  ***  [x] Peripheral IV  [] Central Venous Line     	[] R	[] L	[] IJ	[] Fem	[] SC	Date Placed:   [] Arterial Line		[] R	[] L	[] Fem	[] Rad	[] Ax	Date Placed:   [] PICC		[] Midline		[] Mediport  [] Urinary Catheter		Date Placed:   [x] Necessity of urinary, arterial, and venous catheters discussed    LABS  --------------------------------------------------------------------------------------  ((Insert SICU Labs here))***  --------------------------------------------------------------------------------------    OTHER LABORATORY:     IMAGING STUDIES:   CXR: SICU Daily Progress Note  =====================================================  Interval/Overnight Events: New right axillary arterial line placed. Remains off vasopressors. Total of 10mg Vitamin K given for elevated INR, likely in the setting of malnutrition. Plan for TPN today.     54y Female PMHx HTN, T2DM, asthma, depression with recent diagnosis of invasive ampullary adenocarcinoma of the CBD admitted for cholangitis on 1/5, s/p ERCP 1/8, whipples 1/11, rapid response for GIB on floor 1/18 s/p MTP, RTOR s/p celiotomy, evacuation of hematoma, GDA bleed stopped and sutured, Abthera vac.   Closure of abdomen with stratice mest on 1/21, with inna drains and left lower JOURDAN drain in subcut.  Incisional bleed 1/23, bedside skin staples removed, controlled h'ge, remains intubated but off pressors, clinically declining.    Allergies: No Known Allergies      MEDICATIONS:   --------------------------------------------------------------------------------------  Neurologic Medications  dexmedetomidine Infusion 0.5 MICROgram(s)/kG/Hr IV Continuous <Continuous>  fentaNYL   Infusion. 0.5 MICROgram(s)/kG/Hr IV Continuous <Continuous>    Respiratory Medications    Cardiovascular Medications    Gastrointestinal Medications  dextrose 5% + sodium chloride 0.9%. 1000 milliLiter(s) IV Continuous <Continuous>  magnesium sulfate  IVPB 2 Gram(s) IV Intermittent once  pantoprazole  Injectable 40 milliGRAM(s) IV Push daily    Genitourinary Medications    Hematologic/Oncologic Medications  enoxaparin Injectable 40 milliGRAM(s) SubCutaneous daily    Antimicrobial/Immunologic Medications  meropenem  IVPB 1000 milliGRAM(s) IV Intermittent every 8 hours  meropenem  IVPB      micafungin IVPB      micafungin IVPB 100 milliGRAM(s) IV Intermittent every 24 hours  vancomycin  IVPB      vancomycin  IVPB 1000 milliGRAM(s) IV Intermittent every 12 hours    Endocrine/Metabolic Medications  insulin lispro (HumaLOG) corrective regimen sliding scale   SubCutaneous every 6 hours    Topical/Other Medications  artificial  tears Solution 1 Drop(s) Both EYES every 12 hours  chlorhexidine 0.12% Liquid 15 milliLiter(s) Oral Mucosa two times a day  chlorhexidine 4% Liquid 1 Application(s) Topical <User Schedule>    --------------------------------------------------------------------------------------    VITAL SIGNS, INS/OUTS (last 24 hours):  --------------------------------------------------------------------------------------  ((Insert SICU Vitals/Is+Os here))***  --------------------------------------------------------------------------------------    EXAM  NEUROLOGY  RASS: -3 to -4  Exam: sedated, minimally arousable.  Awakens but agitated when off sedation    HEENT  Exam: Normocephalic, atraumatic, EOMI.     RESPIRATORY  Exam: Lungs clear to auscultation, Normal expansion/effort.  Mechanical Ventilation: Mode: AC/ CMV (Assist Control/ Continuous Mandatory Ventilation), RR (machine): 14, TV (machine): 350, FiO2: 40, PEEP: 5, MAP: 6, PIP: 20    CARDIOVASCULAR  Exam: S1, S2.  Regular rate and rhythm. 3+ edema     GI/NUTRITION  Exam: Mildly distended, midline incision open with wet to dry dressing  Diet: NPO    VASCULAR  Exam: Extremities warm, pink, well-perfused.    MUSCULOSKELETAL  Exam: All extremities moving spontaneously without limitations. *    SKIN  Exam: Good skin turgor, no skin breakdown.     METABOLIC/FLUIDS/ELECTROLYTES  dextrose 5% + sodium chloride 0.9%. 1000 milliLiter(s) IV Continuous <Continuous>  magnesium sulfate  IVPB 2 Gram(s) IV Intermittent once    HEMATOLOGIC  [x] VTE Prophylaxis: enoxaparin Injectable 40 milliGRAM(s) SubCutaneous daily    Transfusions:	[] PRBC	[] Platelets		[] FFP	[] Cryoprecipitate    INFECTIOUS DISEASE  Antimicrobials/Immunologic Medications:  meropenem  IVPB 1000 milliGRAM(s) IV Intermittent every 8 hours  meropenem  IVPB      micafungin IVPB      micafungin IVPB 100 milliGRAM(s) IV Intermittent every 24 hours  vancomycin  IVPB      vancomycin  IVPB 1000 milliGRAM(s) IV Intermittent every 12 hours    Day #      of     ***    Tubes/Lines/Drains  ***  [x] Peripheral IV  [] Central Venous Line     	[] R	[] L	[] IJ	[] Fem	[] SC	Date Placed:   [] Arterial Line		[] R	[] L	[] Fem	[] Rad	[] Ax	Date Placed:   [] PICC		[] Midline		[] Mediport  [] Urinary Catheter		Date Placed:   [x] Necessity of urinary, arterial, and venous catheters discussed    LABS  --------------------------------------------------------------------------------------  ((Insert SICU Labs here))***  --------------------------------------------------------------------------------------    OTHER LABORATORY:     IMAGING STUDIES:   CXR:

## 2019-01-24 NOTE — PROGRESS NOTE ADULT - SUBJECTIVE AND OBJECTIVE BOX
HISTORY   NOEMI SOUSA 54yFemale [id number]    HPI:  53 y.o. 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 Patient had acute onset of RUQ pain yesterday, with no inciting factors or relief with Aleve. The patient's family also endorsed 1-2 episodes of NBNB emesis yesterday and has had decreased PO intake over the last 36hours. As of diagnosis of cancer, the patient has not had follow up with oncology or surg-oncology due insurance issues. The patient has an appointment with [facility] on Wednesday for surgical evaluation. (05 Jan 2019 15:06)    The patient was seen and examined. No acute events overnight or throughout the day. Pain well controlled. Yesterday, hematoma was evacuated w/ 1u PRBC given w/ appropriate response. Was on precedex and fentanyl but off pressors during the day yesterday. intubated. Today dressing was changed by plastic surgery. Wet to dry with Curlex and skin barrier. Cover with abdominal pad. Wound did not look infected at this time. Will continue dressing changes twice daily.           24 HOUR EVENTS:    SUBJECTIVE/ROS:  [ ] A ten-point review of systems was otherwise negative except as noted.  [ ] Due to altered mental status/intubation, subjective information were not able to be obtained from the patient. History was obtained, to the extent possible, from review of the chart and collateral sources of information.      NEURO  Exam: Sedated/intubated, withdraws from painful stimuli  Meds: dexmedetomidine Infusion 0.5 MICROgram(s)/kG/Hr IV Continuous <Continuous>  fentaNYL   Infusion. 0.5 MICROgram(s)/kG/Hr IV Continuous <Continuous>    [x] Adequacy of sedation and pain control has been assessed and adjusted      RESPIRATORY  RR: 17 (01-24-19 @ 15:00) (11 - 32)  SpO2: 100% (01-24-19 @ 15:07) (93% - 100%)  Wt(kg): --  Exam: Intubated, clear to auscultation bilaterally  Mechanical Ventilation: Mode: CPAP with PS, RR (patient): 11, FiO2: 50, PEEP: 10, PS: 13  ABG - ( 24 Jan 2019 04:20 )  pH: 7.42  /  pCO2: 37    /  pO2: 62    / HCO3: 24    / Base Excess: -0.2  /  SaO2: 92.4    Lactate: 2.8        [N/A] Extubation Readiness Assessed      CARDIOVASCULAR  HR: 67 (01-24-19 @ 15:07) (64 - 94)  BP: 123/68 (01-24-19 @ 07:00) (98/50 - 172/86)  BP(mean): 82 (01-24-19 @ 07:00) (61 - 107)  ABP: 144/70 (01-24-19 @ 15:00) (88/51 - 200/100)  ABP(mean): 99 (01-24-19 @ 15:00) (66 - 144)  Wt(kg): --  CVP(cm H2O): --      Exam: regular rate and rhythm  Cardiac Rhythm: sinus  Perfusion     [x]Adequate   [ ]Inadequate  Mentation   [x]Normal       [ ]Reduced  Extremities  [x]Warm         [ ]Cool  Volume Status [ ]Hypervolemic [x]Euvolemic [ ]Hypovolemic  Meds:       GI/NUTRITION  Exam: soft, nontender, nondistended, incision C/D/open, Wound dressing was changed   Diet: TPN with lipids started  Meds: pantoprazole  Injectable 40 milliGRAM(s) IV Push daily      GENITOURINARY  I&O's Detail    01-23 @ 07:01 - 01-24 @ 07:00  --------------------------------------------------------  IN:    dexmedetomidine Infusion: 130.2 mL    dextrose 5% + sodium chloride 0.45%.: 800 mL    dextrose 5% + sodium chloride 0.9%.: 1400 mL    fentaNYL  Infusion: 57.1 mL    fentaNYL Infusion.: 77.2 mL    IV PiggyBack: 650 mL    Sodium Chloride 0.9% IV Bolus: 1000 mL  Total IN: 4114.5 mL    OUT:    Bulb: 20 mL    Bulb: 50 mL    Indwelling Catheter - Urethral: 555 mL    Nasoenteral Tube: 250 mL  Total OUT: 875 mL    Total NET: 3239.5 mL      01-24 @ 07:01 - 01-24 @ 16:08  --------------------------------------------------------  IN:    Albumin 25%: 50 mL    dexmedetomidine Infusion: 7.2 mL    dexmedetomidine Infusion: 7.2 mL    dextrose 5% + sodium chloride 0.9%.: 600 mL    fentaNYL Infusion.: 11.5 mL    fentaNYL Infusion.: 80.5 mL    IV PiggyBack: 100 mL  Total IN: 856.4 mL    OUT:    Bulb: 20 mL    Bulb: 230 mL    Indwelling Catheter - Urethral: 960 mL  Total OUT: 1210 mL    Total NET: -353.6 mL          01-24    132<L>  |  100  |  9   ----------------------------<  155<H>  4.2   |  21<L>  |  0.52    Ca    7.3<L>      24 Jan 2019 04:20  Phos  2.6     01-24  Mg     2.3     01-24    TPro  5.0<L>  /  Alb  2.1<L>  /  TBili  3.9<H>  /  DBili  3.1<H>  /  AST  73<H>  /  ALT  23  /  AlkPhos  199<H>  01-22    [ ] Swanson catheter, indication: N/A  Meds: dextrose 5% + sodium chloride 0.9%. 1000 milliLiter(s) IV Continuous <Continuous>  fat emulsion (Fish Oil and Plant Based) 20% Infusion 6.25 mL/Hr IV Continuous <Continuous>  Parenteral Nutrition - Adult 1 Each TPN Continuous <Continuous>        HEMATOLOGIC  Meds: enoxaparin Injectable 40 milliGRAM(s) SubCutaneous daily    [x] VTE Prophylaxis                        9.5    17.82 )-----------( 156      ( 24 Jan 2019 04:20 )             28.8     PT/INR - ( 23 Jan 2019 15:54 )   PT: 18.2 SEC;   INR: 1.62          PTT - ( 23 Jan 2019 15:54 )  PTT:33.0 SEC  Transfusion     [ ] PRBC   [ ] Platelets   [ ] FFP   [ ] Cryoprecipitate      INFECTIOUS DISEASES  WBC Count: 17.82 K/uL (01-24 @ 04:20)  WBC Count: 17.23 K/uL (01-23 @ 22:55)    RECENT CULTURES:  Specimen Source: URINE CATHETER  Date/Time: 01-21 @ 09:31  Culture Results: --  Gram Stain: --  Organism: --  Specimen Source: BLOOD  Date/Time: 01-21 @ 02:28  Culture Results: --  Gram Stain: --  Organism: --  Specimen Source: BLOOD PERIPHERAL  Date/Time: 01-19 @ 22:25  Culture Results: --  Gram Stain: --  Organism: --    Meds: meropenem  IVPB 1000 milliGRAM(s) IV Intermittent every 8 hours  meropenem  IVPB      micafungin IVPB      micafungin IVPB 100 milliGRAM(s) IV Intermittent every 24 hours  vancomycin  IVPB      vancomycin  IVPB 1000 milliGRAM(s) IV Intermittent every 12 hours        ENDOCRINE  CAPILLARY BLOOD GLUCOSE      POCT Blood Glucose.: 138 mg/dL (24 Jan 2019 11:49)  POCT Blood Glucose.: 139 mg/dL (24 Jan 2019 04:52)  POCT Blood Glucose.: 143 mg/dL (23 Jan 2019 17:09)    Meds: insulin lispro (HumaLOG) corrective regimen sliding scale   SubCutaneous every 6 hours        ACCESS DEVICES:  [ ] Peripheral IV  [ ] Central Venous Line	[ ] R	[ ] L	[ ] IJ	[ ] Fem	[ ] SC	Placed:   [ ] Arterial Line		[ ] R	[ ] L	[ ] Fem	[ ] Rad	[ ] Ax	Placed:   [ ] PICC:					[ ] Mediport  [ x] Urinary Catheter, Date Placed: 1/21  [x] Necessity of urinary, arterial, and venous catheters discussed  [x] 2x abdominal drains    OTHER MEDICATIONS:  artificial  tears Solution 1 Drop(s) Both EYES every 12 hours  chlorhexidine 0.12% Liquid 15 milliLiter(s) Oral Mucosa two times a day  chlorhexidine 4% Liquid 1 Application(s) Topical <User Schedule>      CODE STATUS: full

## 2019-01-24 NOTE — PROGRESS NOTE ADULT - ASSESSMENT
54y Female PMHx HTN, T2DM, asthma, depression with recent diagnosis of invasive ampullary adenocarcinoma of the CBD whom underwent an uneventful whipple 1/11/18. Patient has had an uptrending WBC since procedure, initially attributed to not restarting antibiotics for cholangitis, but experienced abdominal tenderness/distention and tachycardia yesterday and was transferred to SICU for close hemodynamic monitoring. Now s/p Celiotomy, evacuation of hematoma, cessation of gastroduodenal artery bleeding, and placement of Abthera vac 1/18. now s/p RTOR today for abthera exchange v. abdominal wall 1/21    PLAN:  -continue care per SICU  -Pain control as needed  -Monitor vs, uop  -Monitor abd incision and note number of times dressing changed.  -Serial CBCs, H/H  -cont meropenem, vanc, micafungin  -npo/ivf  -R IJ triple lumen PICC for possible TPN today  -dvt ppx: 40mg lovenox subq

## 2019-01-25 LAB
ALBUMIN SERPL ELPH-MCNC: 2 G/DL — LOW (ref 3.3–5)
ALP SERPL-CCNC: 229 U/L — HIGH (ref 40–120)
ALT FLD-CCNC: 23 U/L — SIGNIFICANT CHANGE UP (ref 4–33)
ANION GAP SERPL CALC-SCNC: 10 MMO/L — SIGNIFICANT CHANGE UP (ref 7–14)
ANION GAP SERPL CALC-SCNC: 11 MMO/L — SIGNIFICANT CHANGE UP (ref 7–14)
ANION GAP SERPL CALC-SCNC: 12 MMO/L — SIGNIFICANT CHANGE UP (ref 7–14)
ANION GAP SERPL CALC-SCNC: 12 MMO/L — SIGNIFICANT CHANGE UP (ref 7–14)
APTT BLD: 34.3 SEC — SIGNIFICANT CHANGE UP (ref 27.5–36.3)
AST SERPL-CCNC: 74 U/L — HIGH (ref 4–32)
BASE EXCESS BLDA CALC-SCNC: 0.2 MMOL/L — SIGNIFICANT CHANGE UP
BASOPHILS # BLD AUTO: 0.06 K/UL — SIGNIFICANT CHANGE UP (ref 0–0.2)
BASOPHILS NFR BLD AUTO: 0.4 % — SIGNIFICANT CHANGE UP (ref 0–2)
BILIRUB SERPL-MCNC: 4.4 MG/DL — HIGH (ref 0.2–1.2)
BLD GP AB SCN SERPL QL: NEGATIVE — SIGNIFICANT CHANGE UP
BUN SERPL-MCNC: 10 MG/DL — SIGNIFICANT CHANGE UP (ref 7–23)
BUN SERPL-MCNC: 11 MG/DL — SIGNIFICANT CHANGE UP (ref 7–23)
CA-I BLD-SCNC: 0.99 MMOL/L — LOW (ref 1.03–1.23)
CA-I BLDA-SCNC: 1.06 MMOL/L — LOW (ref 1.15–1.29)
CALCIUM SERPL-MCNC: 7.3 MG/DL — LOW (ref 8.4–10.5)
CALCIUM SERPL-MCNC: 7.7 MG/DL — LOW (ref 8.4–10.5)
CALCIUM SERPL-MCNC: 7.7 MG/DL — LOW (ref 8.4–10.5)
CALCIUM SERPL-MCNC: 7.8 MG/DL — LOW (ref 8.4–10.5)
CHLORIDE SERPL-SCNC: 92 MMOL/L — LOW (ref 98–107)
CHLORIDE SERPL-SCNC: 93 MMOL/L — LOW (ref 98–107)
CHLORIDE SERPL-SCNC: 94 MMOL/L — LOW (ref 98–107)
CHLORIDE SERPL-SCNC: 99 MMOL/L — SIGNIFICANT CHANGE UP (ref 98–107)
CO2 SERPL-SCNC: 22 MMOL/L — SIGNIFICANT CHANGE UP (ref 22–31)
CO2 SERPL-SCNC: 26 MMOL/L — SIGNIFICANT CHANGE UP (ref 22–31)
CO2 SERPL-SCNC: 26 MMOL/L — SIGNIFICANT CHANGE UP (ref 22–31)
CO2 SERPL-SCNC: 28 MMOL/L — SIGNIFICANT CHANGE UP (ref 22–31)
CREAT SERPL-MCNC: 0.59 MG/DL — SIGNIFICANT CHANGE UP (ref 0.5–1.3)
CREAT SERPL-MCNC: 0.6 MG/DL — SIGNIFICANT CHANGE UP (ref 0.5–1.3)
CREAT SERPL-MCNC: 0.62 MG/DL — SIGNIFICANT CHANGE UP (ref 0.5–1.3)
CREAT SERPL-MCNC: 0.65 MG/DL — SIGNIFICANT CHANGE UP (ref 0.5–1.3)
EOSINOPHIL # BLD AUTO: 0.55 K/UL — HIGH (ref 0–0.5)
EOSINOPHIL NFR BLD AUTO: 3.8 % — SIGNIFICANT CHANGE UP (ref 0–6)
GLUCOSE BLDA-MCNC: 154 MG/DL — HIGH (ref 70–99)
GLUCOSE SERPL-MCNC: 156 MG/DL — HIGH (ref 70–99)
GLUCOSE SERPL-MCNC: 218 MG/DL — HIGH (ref 70–99)
GLUCOSE SERPL-MCNC: 267 MG/DL — HIGH (ref 70–99)
GLUCOSE SERPL-MCNC: 272 MG/DL — HIGH (ref 70–99)
HCO3 BLDA-SCNC: 25 MMOL/L — SIGNIFICANT CHANGE UP (ref 22–26)
HCT VFR BLD CALC: 27.4 % — LOW (ref 34.5–45)
HCT VFR BLDA CALC: 27.1 % — LOW (ref 34.5–46.5)
HGB BLD-MCNC: 9.1 G/DL — LOW (ref 11.5–15.5)
HGB BLDA-MCNC: 8.7 G/DL — LOW (ref 11.5–15.5)
IMM GRANULOCYTES NFR BLD AUTO: 5.4 % — HIGH (ref 0–1.5)
INR BLD: 1.41 — HIGH (ref 0.88–1.17)
LACTATE BLDA-SCNC: 2.2 MMOL/L — HIGH (ref 0.5–2)
LYMPHOCYTES # BLD AUTO: 16.4 % — SIGNIFICANT CHANGE UP (ref 13–44)
LYMPHOCYTES # BLD AUTO: 2.34 K/UL — SIGNIFICANT CHANGE UP (ref 1–3.3)
MAGNESIUM SERPL-MCNC: 1.7 MG/DL — SIGNIFICANT CHANGE UP (ref 1.6–2.6)
MAGNESIUM SERPL-MCNC: 1.9 MG/DL — SIGNIFICANT CHANGE UP (ref 1.6–2.6)
MAGNESIUM SERPL-MCNC: 2.1 MG/DL — SIGNIFICANT CHANGE UP (ref 1.6–2.6)
MCHC RBC-ENTMCNC: 28.3 PG — SIGNIFICANT CHANGE UP (ref 27–34)
MCHC RBC-ENTMCNC: 33.2 % — SIGNIFICANT CHANGE UP (ref 32–36)
MCV RBC AUTO: 85.1 FL — SIGNIFICANT CHANGE UP (ref 80–100)
MONOCYTES # BLD AUTO: 1.52 K/UL — HIGH (ref 0–0.9)
MONOCYTES NFR BLD AUTO: 10.6 % — SIGNIFICANT CHANGE UP (ref 2–14)
NEUTROPHILS # BLD AUTO: 9.05 K/UL — HIGH (ref 1.8–7.4)
NEUTROPHILS NFR BLD AUTO: 63.4 % — SIGNIFICANT CHANGE UP (ref 43–77)
NRBC # FLD: 0.04 K/UL — LOW (ref 25–125)
PCO2 BLDA: 41 MMHG — SIGNIFICANT CHANGE UP (ref 32–48)
PH BLDA: 7.4 PH — SIGNIFICANT CHANGE UP (ref 7.35–7.45)
PHOSPHATE SERPL-MCNC: 2.5 MG/DL — SIGNIFICANT CHANGE UP (ref 2.5–4.5)
PHOSPHATE SERPL-MCNC: 2.6 MG/DL — SIGNIFICANT CHANGE UP (ref 2.5–4.5)
PHOSPHATE SERPL-MCNC: 2.9 MG/DL — SIGNIFICANT CHANGE UP (ref 2.5–4.5)
PLATELET # BLD AUTO: 177 K/UL — SIGNIFICANT CHANGE UP (ref 150–400)
PMV BLD: 10.8 FL — SIGNIFICANT CHANGE UP (ref 7–13)
PO2 BLDA: 130 MMHG — HIGH (ref 83–108)
POTASSIUM BLDA-SCNC: 3.3 MMOL/L — LOW (ref 3.4–4.5)
POTASSIUM SERPL-MCNC: 3.4 MMOL/L — LOW (ref 3.5–5.3)
POTASSIUM SERPL-MCNC: 3.4 MMOL/L — LOW (ref 3.5–5.3)
POTASSIUM SERPL-MCNC: 3.7 MMOL/L — SIGNIFICANT CHANGE UP (ref 3.5–5.3)
POTASSIUM SERPL-MCNC: 4.2 MMOL/L — SIGNIFICANT CHANGE UP (ref 3.5–5.3)
POTASSIUM SERPL-SCNC: 3.4 MMOL/L — LOW (ref 3.5–5.3)
POTASSIUM SERPL-SCNC: 3.4 MMOL/L — LOW (ref 3.5–5.3)
POTASSIUM SERPL-SCNC: 3.7 MMOL/L — SIGNIFICANT CHANGE UP (ref 3.5–5.3)
POTASSIUM SERPL-SCNC: 4.2 MMOL/L — SIGNIFICANT CHANGE UP (ref 3.5–5.3)
PROT SERPL-MCNC: 5.1 G/DL — LOW (ref 6–8.3)
PROTHROM AB SERPL-ACNC: 15.8 SEC — HIGH (ref 9.8–13.1)
RBC # BLD: 3.22 M/UL — LOW (ref 3.8–5.2)
RBC # FLD: 18.4 % — HIGH (ref 10.3–14.5)
RH IG SCN BLD-IMP: POSITIVE — SIGNIFICANT CHANGE UP
SAO2 % BLDA: 99.1 % — HIGH (ref 95–99)
SODIUM BLDA-SCNC: 128 MMOL/L — LOW (ref 136–146)
SODIUM SERPL-SCNC: 131 MMOL/L — LOW (ref 135–145)
SODIUM SERPL-SCNC: 132 MMOL/L — LOW (ref 135–145)
WBC # BLD: 14.29 K/UL — HIGH (ref 3.8–10.5)
WBC # FLD AUTO: 14.29 K/UL — HIGH (ref 3.8–10.5)

## 2019-01-25 PROCEDURE — 99291 CRITICAL CARE FIRST HOUR: CPT

## 2019-01-25 PROCEDURE — 99292 CRITICAL CARE ADDL 30 MIN: CPT

## 2019-01-25 PROCEDURE — 71045 X-RAY EXAM CHEST 1 VIEW: CPT | Mod: 26

## 2019-01-25 RX ORDER — ELECTROLYTE SOLUTION,INJ
1 VIAL (ML) INTRAVENOUS
Qty: 0 | Refills: 0 | Status: DISCONTINUED | OUTPATIENT
Start: 2019-01-25 | End: 2019-01-25

## 2019-01-25 RX ORDER — I.V. FAT EMULSION 20 G/100ML
12.5 EMULSION INTRAVENOUS
Qty: 30 | Refills: 0 | Status: DISCONTINUED | OUTPATIENT
Start: 2019-01-25 | End: 2019-01-27

## 2019-01-25 RX ORDER — POTASSIUM CHLORIDE 20 MEQ
20 PACKET (EA) ORAL ONCE
Qty: 0 | Refills: 0 | Status: COMPLETED | OUTPATIENT
Start: 2019-01-25 | End: 2019-01-25

## 2019-01-25 RX ORDER — MAGNESIUM SULFATE 500 MG/ML
2 VIAL (ML) INJECTION ONCE
Qty: 0 | Refills: 0 | Status: COMPLETED | OUTPATIENT
Start: 2019-01-25 | End: 2019-01-26

## 2019-01-25 RX ORDER — ACETAMINOPHEN 500 MG
1000 TABLET ORAL ONCE
Qty: 0 | Refills: 0 | Status: COMPLETED | OUTPATIENT
Start: 2019-01-25 | End: 2019-01-25

## 2019-01-25 RX ORDER — ALBUMIN HUMAN 25 %
50 VIAL (ML) INTRAVENOUS ONCE
Qty: 0 | Refills: 0 | Status: COMPLETED | OUTPATIENT
Start: 2019-01-25 | End: 2019-01-25

## 2019-01-25 RX ORDER — SODIUM CHLORIDE 9 MG/ML
1000 INJECTION, SOLUTION INTRAVENOUS
Qty: 0 | Refills: 0 | Status: DISCONTINUED | OUTPATIENT
Start: 2019-01-25 | End: 2019-01-29

## 2019-01-25 RX ORDER — INSULIN LISPRO 100/ML
VIAL (ML) SUBCUTANEOUS EVERY 4 HOURS
Qty: 0 | Refills: 0 | Status: DISCONTINUED | OUTPATIENT
Start: 2019-01-25 | End: 2019-01-29

## 2019-01-25 RX ORDER — INSULIN LISPRO 100/ML
VIAL (ML) SUBCUTANEOUS
Qty: 0 | Refills: 0 | Status: DISCONTINUED | OUTPATIENT
Start: 2019-01-25 | End: 2019-01-25

## 2019-01-25 RX ORDER — FUROSEMIDE 40 MG
40 TABLET ORAL ONCE
Qty: 0 | Refills: 0 | Status: COMPLETED | OUTPATIENT
Start: 2019-01-25 | End: 2019-01-25

## 2019-01-25 RX ORDER — NOREPINEPHRINE BITARTRATE/D5W 8 MG/250ML
0.05 PLASTIC BAG, INJECTION (ML) INTRAVENOUS
Qty: 16 | Refills: 0 | Status: DISCONTINUED | OUTPATIENT
Start: 2019-01-25 | End: 2019-01-26

## 2019-01-25 RX ORDER — BUMETANIDE 0.25 MG/ML
1 INJECTION INTRAMUSCULAR; INTRAVENOUS
Qty: 20 | Refills: 0 | Status: DISCONTINUED | OUTPATIENT
Start: 2019-01-25 | End: 2019-01-25

## 2019-01-25 RX ADMIN — I.V. FAT EMULSION 12.5 ML/HR: 20 EMULSION INTRAVENOUS at 16:33

## 2019-01-25 RX ADMIN — MICAFUNGIN SODIUM 105 MILLIGRAM(S): 100 INJECTION, POWDER, LYOPHILIZED, FOR SOLUTION INTRAVENOUS at 21:49

## 2019-01-25 RX ADMIN — CHLORHEXIDINE GLUCONATE 15 MILLILITER(S): 213 SOLUTION TOPICAL at 05:10

## 2019-01-25 RX ADMIN — Medication 100 MILLIEQUIVALENT(S): at 21:00

## 2019-01-25 RX ADMIN — Medication 2.69 MICROGRAM(S)/KG/MIN: at 00:32

## 2019-01-25 RX ADMIN — Medication 1 DROP(S): at 05:10

## 2019-01-25 RX ADMIN — MEROPENEM 100 MILLIGRAM(S): 1 INJECTION INTRAVENOUS at 15:17

## 2019-01-25 RX ADMIN — Medication 4: at 23:48

## 2019-01-25 RX ADMIN — Medication 250 MILLIGRAM(S): at 17:20

## 2019-01-25 RX ADMIN — BUMETANIDE 5 MG/HR: 0.25 INJECTION INTRAMUSCULAR; INTRAVENOUS at 13:36

## 2019-01-25 RX ADMIN — PANTOPRAZOLE SODIUM 40 MILLIGRAM(S): 20 TABLET, DELAYED RELEASE ORAL at 12:00

## 2019-01-25 RX ADMIN — Medication 1 DROP(S): at 18:28

## 2019-01-25 RX ADMIN — CHLORHEXIDINE GLUCONATE 15 MILLILITER(S): 213 SOLUTION TOPICAL at 18:28

## 2019-01-25 RX ADMIN — FENTANYL CITRATE 2.87 MICROGRAM(S)/KG/HR: 50 INJECTION INTRAVENOUS at 08:44

## 2019-01-25 RX ADMIN — MEROPENEM 100 MILLIGRAM(S): 1 INJECTION INTRAVENOUS at 21:48

## 2019-01-25 RX ADMIN — Medication 1000 MILLIGRAM(S): at 00:04

## 2019-01-25 RX ADMIN — Medication 50 MILLILITER(S): at 12:00

## 2019-01-25 RX ADMIN — FENTANYL CITRATE 2.87 MICROGRAM(S)/KG/HR: 50 INJECTION INTRAVENOUS at 21:49

## 2019-01-25 RX ADMIN — Medication 250 MILLIGRAM(S): at 05:10

## 2019-01-25 RX ADMIN — Medication 1: at 05:10

## 2019-01-25 RX ADMIN — Medication 400 MILLIGRAM(S): at 12:23

## 2019-01-25 RX ADMIN — Medication 100 MILLIEQUIVALENT(S): at 21:48

## 2019-01-25 RX ADMIN — MEROPENEM 100 MILLIGRAM(S): 1 INJECTION INTRAVENOUS at 05:10

## 2019-01-25 RX ADMIN — Medication 40 MILLIGRAM(S): at 12:00

## 2019-01-25 RX ADMIN — Medication 1 EACH: at 17:01

## 2019-01-25 RX ADMIN — Medication 2.69 MICROGRAM(S)/KG/MIN: at 21:49

## 2019-01-25 RX ADMIN — DEXMEDETOMIDINE HYDROCHLORIDE IN 0.9% SODIUM CHLORIDE 7.17 MICROGRAM(S)/KG/HR: 4 INJECTION INTRAVENOUS at 21:50

## 2019-01-25 RX ADMIN — DEXMEDETOMIDINE HYDROCHLORIDE IN 0.9% SODIUM CHLORIDE 7.17 MICROGRAM(S)/KG/HR: 4 INJECTION INTRAVENOUS at 08:44

## 2019-01-25 RX ADMIN — Medication 2: at 17:30

## 2019-01-25 RX ADMIN — Medication 1: at 12:10

## 2019-01-25 RX ADMIN — CHLORHEXIDINE GLUCONATE 1 APPLICATION(S): 213 SOLUTION TOPICAL at 13:36

## 2019-01-25 RX ADMIN — ENOXAPARIN SODIUM 40 MILLIGRAM(S): 100 INJECTION SUBCUTANEOUS at 12:00

## 2019-01-25 NOTE — PROGRESS NOTE ADULT - ASSESSMENT
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  >> External tissue expander device to remain in place until sufficient skin laxity to allow for skin closure. Do not remove or otherwise touch this device. It will automatically tighten on its own without external intervention. It will also maintain appropriate tension on the wound over time.   >> Continue BID packing to the abdominal wound (around the external tissue expander device) with normal-saline-moistened Kerlix gauze.  >> Nutritional optimization.  >> Medical optimization.    [facility] Plastic Surgery Pager -- [phone]  [facility] Plastic Surgery Pager -- [phone]  [facility] Plastic Surgery Pager -- [phone]

## 2019-01-25 NOTE — PROGRESS NOTE ADULT - ATTENDING COMMENTS
54y Female PMHx HTN, T2DM, asthma, depression with recent diagnosis of invasive ampullary adenocarcinoma of the CBD admitted for cholangitis on 1/5, s/p ERCP 1/8, whipple 1/11, rapid response for GIB on floor 1/18 s/p MTP, RTOR s/p celiotomy, evacuation of hematoma, GDA bleed stopped and sutured, Abthera vac.   Closure of abdomen with strattice mest on 1/21, with inna drains and left lower JOURDAN drain in subcutaneous   Incisional bleed 1/23, bedside skin staples removed, controlled hemorrhage, remains intubated but off pressors, clinically declining.  fentanyl gtt for sedative analgesia will decrease dose  - Precedex @ low dose  - Levo was d/c  - daily sedation vacation  - monitor mental status     Resp:  - Hypoxemia improving  - q8 ABG, adjust vent settings prn  - CPAP trial as tolerated if patient remains stable then consider extubation     CV:  - monitor SBP, now off vasopressors   - IVF resuscitation as needed   - pending ECHO     GI:  - TPN Lipids started  - Continue protonix    Heme:   -monitor H/H  -lovenox vte ppx    Renal:  - Strict I&O, monitor lytes, replete as needed  - D5 1/2NS @ 100  - urine output remains low still low 20-30cc/hr  - Patient continues to be net positive despite attempt at diuresis with Lasix yesterday   -Albumin 50@25%  -Lasix 40  -Bumex drip 20/100 @ 1 or 2  -Net negative 1326 I/O    ID:   - UA positive, will f/u urine cultures  - Meropenem/vancomycin for broadened coverage, no intra op cultures sent  - Continue micafungin for at least 10 days total  - blood culture 1/18 f/u- +coag negative staph  - f/u BCX from 1/19 and 1/20  - Will continue Abx for 1 days (1/26) and reassess clinically     Endo:  - diabetic, FS and low dose corrective regimen for hyperglycemia     Access: LIJ central line, 2 PIV, JOURDAN draining serosanguinous, ETT, R axillary arterial line     Dispo: SICU    SICU Dx: Malnutrition, invasive ampullary carcinoma s/p whipple, acute blood loss anemia, Fluid overload    The patient is a critical care patient with life threatening hemodynamic and metabolic instability in SICU.  I have personally interviewed when possible and examined the patient, reviewed data and laboratory tests/x-rays and all pertinent electronic images.  I was physically present for the key portions of the evaluation and management (E/M) service provided.   The SICU team has a constant risk benefit analyzes discussion with the primary team, all consultants, House Staff and PA's on all decisions.  These diagnoses are unrelated to the surgical procedure noted above.  I meet with family if needed to get further history, discuss the case and make care decisions for this patient who might not be able to participate.  Time involved in performance of separately billable procedures was not counted toward my critical care time. There is no overlap.  I spent 55-75 minutes of critical care time for the diagnoses, assessment, plan and interventions.

## 2019-01-25 NOTE — PROGRESS NOTE ADULT - SUBJECTIVE AND OBJECTIVE BOX
HISTORY   [id number]    HPI:  Interval/Overnight Events:  Patient hypotensive requiring reinitiation of low dose vasopressors (Levophed). Remained on pressure support 13/10, oxygenating well. Precedex stopped secondary to hypotension. Patient remained unresponsive. Plastic surgery consulted for eventual abdominal closure. Pt's dressing was changed wet to dry with normal saline curlex. Skin barrier. Cover with abdominal binding. Must change twice daily.  Sedatives and pressors were decreased and pt has been doing well. Pt was volume overloaded and was given Albumin, Lasix, and a Bumex drip. Has been putting out more urine and is currently net negative I/O.    54y Female PMHx HTN, T2DM, asthma, depression with recent diagnosis of invasive ampullary adenocarcinoma of the CBD admitted for cholangitis on 1/5, s/p ERCP 1/8, whipple 1/11, rapid response for GIB on floor 1/18 s/p MTP, RTOR s/p celiotomy, evacuation of hematoma, GDA bleed stopped and sutured, Abthera vac.   Closure of abdomen with strattice mest on 1/21, with inna drains and left lower JOURDAN drain in subcutaneous   Incisional bleed 1/23, bedside skin staples removed, controlled hemorrhage, remains intubated but off pressors, clinically declining.      24 HOUR EVENTS:    SUBJECTIVE/ROS:  [ ] A ten-point review of systems was otherwise negative except as noted.  [ ] Due to altered mental status/intubation, subjective information were not able to be obtained from the patient. History was obtained, to the extent possible, from review of the chart and collateral sources of information.      NEURO  RASS: -3 to -4  Exam: sedated, minimally arousable.  Awakens but agitated when off sedation  Meds: dexmedetomidine Infusion 0.5 MICROgram(s)/kG/Hr IV Continuous <Continuous>  fentaNYL   Infusion. 0.5 MICROgram(s)/kG/Hr IV Continuous <Continuous>    [x] Adequacy of sedation and pain control has been assessed and adjusted    HEENT  Exam: Normocephalic, atraumatic, EOMI.     RESPIRATORY  RR: 8 (01-25-19 @ 17:00) (8 - 24)  SpO2: 99% (01-25-19 @ 17:00) (93% - 100%)  Wt(kg): --  Exam: unlabored, clear to auscultation bilaterally  Mechanical Ventilation: Mode: CPAP with PS, RR (patient): 9, FiO2: 50, PEEP: 10, PS: 13, MAP: 12.6  ABG - ( 25 Jan 2019 00:20 )  pH: 7.40  /  pCO2: 41    /  pO2: 130   / HCO3: 25    / Base Excess: 0.2   /  SaO2: 99.1    Lactate: 2.2              [N/A] Extubation Readiness Assessed  Meds:       CARDIOVASCULAR  Exam: S1, S2.  Regular rate and rhythm. 3+ edema   HR: 62 (01-25-19 @ 17:00) (62 - 93)  BP: --  BP(mean): --  ABP: 112/60 (01-25-19 @ 17:00) (88/47 - 204/108)  ABP(mean): 80 (01-25-19 @ 17:00) (64 - 147)  Wt(kg): --  CVP(cm H2O): --      Exam: regular rate and rhythm  Cardiac Rhythm: sinus  Perfusion     [x]Adequate   [ ]Inadequate  Mentation   [x]Normal       [ ]Reduced  Extremities  [x]Warm         [ ]Cool  Volume Status [ ]Hypervolemic [x]Euvolemic [ ]Hypovolemic  Meds: buMETAnide Infusion 1 mG/Hr IV Continuous <Continuous>  norepinephrine Infusion 0.05 MICROgram(s)/kG/Min IV Continuous <Continuous>        GI/NUTRITION   Mildly distended, midline incision open with wet to dry dressing, JPx2  Diet: TPN  Meds: pantoprazole  Injectable 40 milliGRAM(s) IV Push daily      GENITOURINARY  I&O's Detail    01-24 @ 07:01  -  01-25 @ 07:00  --------------------------------------------------------  IN:    Albumin 25%: 50 mL    dexmedetomidine Infusion: 7.2 mL    dexmedetomidine Infusion: 24.4 mL    dextrose 5% + sodium chloride 0.9%: 675 mL    fat emulsion (Fish Oil and Plant Based) 20% Infusion: 75 mL    fentaNYL Infusion.: 207 mL    fentaNYL Infusion.: 11.5 mL    IV PiggyBack: 900 mL    norepinephrine Infusion: 7.1 mL    TPN (Total Parenteral Nutrition): 588 mL  Total IN: 2545.2 mL    OUT:    Bulb: 85 mL    Bulb: 345 mL    Indwelling Catheter - Urethral: 1525 mL    Nasoenteral Tube: 300 mL  Total OUT: 2255 mL    Total NET: 290.2 mL      01-25 @ 07:01  -  01-25 @ 18:26  --------------------------------------------------------  IN:    Albumin 25%: 50 mL    bumetanide Infusion: 20 mL    dexmedetomidine Infusion: 100 mL    fat emulsion (Fish Oil and Plant Based) 20% Infusion: 60 mL    fentaNYL Infusion.: 91.8 mL    IV PiggyBack: 200 mL    norepinephrine Infusion: 8.1 mL    TPN (Total Parenteral Nutrition): 502 mL  Total IN: 1031.9 mL    OUT:    Bulb: 150 mL    Bulb: 65 mL    Indwelling Catheter - Urethral: 1655 mL    Nasoenteral Tube: 100 mL  Total OUT: 1970 mL    Total NET: -938.1 mL          01-25    131<L>  |  99  |  10  ----------------------------<  156<H>  3.7   |  22  |  0.59    Ca    7.3<L>      25 Jan 2019 00:20  Phos  2.5     01-25  Mg     2.1     01-25    TPro  5.1<L>  /  Alb  2.0<L>  /  TBili  4.4<H>  /  DBili  x   /  AST  74<H>  /  ALT  23  /  AlkPhos  229<H>  01-25    [ ] Swanson catheter, indication: N/A  Meds: fat emulsion (Fish Oil and Plant Based) 20% Infusion 6.25 mL/Hr IV Continuous <Continuous>  fat emulsion (Fish Oil and Plant Based) 20% Infusion 12.5 mL/Hr IV Continuous <Continuous>  Parenteral Nutrition - Adult 1 Each TPN Continuous <Continuous>  Parenteral Nutrition - Adult 1 Each TPN Continuous <Continuous>        HEMATOLOGIC  Meds: enoxaparin Injectable 40 milliGRAM(s) SubCutaneous daily    [x] VTE Prophylaxis                        9.1    14.29 )-----------( 177      ( 25 Jan 2019 00:20 )             27.4     PT/INR - ( 25 Jan 2019 00:20 )   PT: 15.8 SEC;   INR: 1.41          PTT - ( 25 Jan 2019 00:20 )  PTT:34.3 SEC  Transfusion     [ ] PRBC   [ ] Platelets   [ ] FFP   [ ] Cryoprecipitate      INFECTIOUS DISEASES  WBC Count: 14.29 K/uL (01-25 @ 00:20)    RECENT CULTURES:  Specimen Source: URINE CATHETER  Date/Time: 01-21 @ 09:31  Culture Results: --  Gram Stain: --  Organism: --  Specimen Source: BLOOD  Date/Time: 01-21 @ 02:28  Culture Results: --  Gram Stain: --  Organism: --    Meds: meropenem  IVPB 1000 milliGRAM(s) IV Intermittent every 8 hours  meropenem  IVPB      micafungin IVPB      micafungin IVPB 100 milliGRAM(s) IV Intermittent every 24 hours  vancomycin  IVPB      vancomycin  IVPB 1000 milliGRAM(s) IV Intermittent every 12 hours        ENDOCRINE  CAPILLARY BLOOD GLUCOSE      POCT Blood Glucose.: 164 mg/dL (25 Jan 2019 12:09)  POCT Blood Glucose.: 156 mg/dL (25 Jan 2019 05:09)  POCT Blood Glucose.: 171 mg/dL (24 Jan 2019 23:12)  POCT Blood Glucose.: 155 mg/dL (24 Jan 2019 18:30)    Meds: insulin lispro (HumaLOG) corrective regimen sliding scale   SubCutaneous every 6 hours        ACCESS DEVICES:  [x ] Peripheral IV  [ x] Central Venous Line	[ ] R	[x ] L	[x ] IJ	[ ] Fem	[ ] SC	Placed:   [x ] Arterial Line		[x ] R	[ ] L	[ ] Fem	[ ] Rad	[x ] Ax	Placed:   [ ] PICC:					[ ] Mediport  [x ] Urinary Catheter, Date Placed:   [x] Necessity of urinary, arterial, and venous catheters discussed    OTHER MEDICATIONS:  artificial  tears Solution 1 Drop(s) Both EYES every 12 hours  chlorhexidine 0.12% Liquid 15 milliLiter(s) Oral Mucosa two times a day  chlorhexidine 4% Liquid 1 Application(s) Topical <User Schedule>

## 2019-01-25 NOTE — PROGRESS NOTE ADULT - SUBJECTIVE AND OBJECTIVE BOX
Surgery Progress Note    S: Patient seen and examined. BP was low o/n and was intermittently on levo. Plastic surgery saw pt yesterday and applied a closure device. Patient remains vent dependent but is off pressors this AM.     O:  Vital Signs Last 24 Hrs  T(C): 36.9 (25 Jan 2019 04:00), Max: 38.5 (25 Jan 2019 00:00)  T(F): 98.4 (25 Jan 2019 04:00), Max: 101.3 (25 Jan 2019 00:00)  HR: 77 (25 Jan 2019 05:00) (66 - 82)  BP: 123/68 (24 Jan 2019 07:00) (123/68 - 123/68)  BP(mean): 82 (24 Jan 2019 07:00) (82 - 82)  RR: 12 (25 Jan 2019 05:00) (10 - 20)  SpO2: 98% (25 Jan 2019 05:00) (96% - 100%)    I&O's Detail    23 Jan 2019 07:01  -  24 Jan 2019 07:00  --------------------------------------------------------  IN:    dexmedetomidine Infusion: 130.2 mL    dextrose 5% + sodium chloride 0.45%.: 800 mL    dextrose 5% + sodium chloride 0.9%: 1400 mL    fentaNYL  Infusion: 57.1 mL    fentaNYL Infusion.: 77.2 mL    IV PiggyBack: 650 mL    Sodium Chloride 0.9% IV Bolus: 1000 mL  Total IN: 4114.5 mL    OUT:    Bulb: 20 mL    Bulb: 50 mL    Indwelling Catheter - Urethral: 555 mL    Nasoenteral Tube: 250 mL  Total OUT: 875 mL    Total NET: 3239.5 mL      24 Jan 2019 07:01  -  25 Jan 2019 06:45  --------------------------------------------------------  IN:    Albumin 25%: 50 mL    dexmedetomidine Infusion: 7.2 mL    dexmedetomidine Infusion: 24.4 mL    dextrose 5% + sodium chloride 0.9%: 675 mL    fat emulsion (Fish Oil and Plant Based) 20% Infusion: 75 mL    fentaNYL Infusion.: 11.5 mL    fentaNYL Infusion.: 207 mL    IV PiggyBack: 900 mL    norepinephrine Infusion: 7.1 mL    TPN (Total Parenteral Nutrition): 588 mL  Total IN: 2545.2 mL    OUT:    Bulb: 85 mL    Bulb: 345 mL    Indwelling Catheter - Urethral: 1525 mL    Nasoenteral Tube: 300 mL  Total OUT: 2255 mL    Total NET: 290.2 mL          MEDICATIONS  (STANDING):  artificial  tears Solution 1 Drop(s) Both EYES every 12 hours  chlorhexidine 0.12% Liquid 15 milliLiter(s) Oral Mucosa two times a day  chlorhexidine 4% Liquid 1 Application(s) Topical <User Schedule>  dexmedetomidine Infusion 0.5 MICROgram(s)/kG/Hr (7.175 mL/Hr) IV Continuous <Continuous>  enoxaparin Injectable 40 milliGRAM(s) SubCutaneous daily  fat emulsion (Fish Oil and Plant Based) 20% Infusion 6.25 mL/Hr (6.25 mL/Hr) IV Continuous <Continuous>  fentaNYL   Infusion. 0.5 MICROgram(s)/kG/Hr (2.87 mL/Hr) IV Continuous <Continuous>  insulin lispro (HumaLOG) corrective regimen sliding scale   SubCutaneous every 6 hours  meropenem  IVPB 1000 milliGRAM(s) IV Intermittent every 8 hours  meropenem  IVPB      micafungin IVPB      micafungin IVPB 100 milliGRAM(s) IV Intermittent every 24 hours  norepinephrine Infusion 0.05 MICROgram(s)/kG/Min (2.691 mL/Hr) IV Continuous <Continuous>  pantoprazole  Injectable 40 milliGRAM(s) IV Push daily  Parenteral Nutrition - Adult 1 Each (42 mL/Hr) TPN Continuous <Continuous>  vancomycin  IVPB      vancomycin  IVPB 1000 milliGRAM(s) IV Intermittent every 12 hours    MEDICATIONS  (PRN):                            9.1    14.29 )-----------( 177      ( 25 Jan 2019 00:20 )             27.4       01-25    131<L>  |  99  |  10  ----------------------------<  156<H>  3.7   |  22  |  0.59    Ca    7.3<L>      25 Jan 2019 00:20  Phos  2.5     01-25  Mg     2.1     01-25    TPro  5.1<L>  /  Alb  2.0<L>  /  TBili  4.4<H>  /  DBili  x   /  AST  74<H>  /  ALT  23  /  AlkPhos  229<H>  01-25      Physical Exam:  Gen: Laying in bed, NAD, NGT in place, on vent  Resp: Unlabored breathing via ventilator  Abd: soft, ND, abdominal binder in place, RLQ JOURDAN with dark/bilious output, LUQ and LLQ JOURDAN SS, no rebound or guarding  Ext: WWP  Skin: No rashes

## 2019-01-25 NOTE — PROGRESS NOTE ADULT - ASSESSMENT
54y Female PMHx HTN, T2DM, asthma, depression with recent diagnosis of invasive ampullary adenocarcinoma of the CBD whom underwent an uneventful whipple 1/11/18. Patient has had an uptrending WBC since procedure, initially attributed to not restarting antibiotics for cholangitis, but experienced abdominal tenderness/distention and tachycardia yesterday and was transferred to SICU for close hemodynamic monitoring. Now s/p Celiotomy, evacuation of hematoma, cessation of gastroduodenal artery bleeding, and placement of Abthera vac 1/18. now s/p RTOR for abdominal washout 1/21    PLAN:  -Pain control as needed  -Monitor vs, uop  -Monitor abd incision and closure device  -Serial CBCs, H/H  -cont meropenem, vanc, micafungin  -npo/ivf  -TPN  -dvt ppx: 40mg lovenox subq   -continue care per SICU    D Team Surgery  [id number]

## 2019-01-25 NOTE — PROGRESS NOTE ADULT - ATTENDING COMMENTS
54y Female PMHx HTN, T2DM, asthma, depression with recent diagnosis of invasive ampullary adenocarcinoma of the CBD admitted for cholangitis on 1/5, s/p ERCP 1/8, whipple 1/11, rapid response for GIB on floor 1/18 s/p MTP, RTOR s/p celiotomy, evacuation of hematoma, GDA bleed stopped and sutured, Abthera vac.   Closure of abdomen with strattice mest on 1/21, with inna drains and left lower JOURDAN drain in subcutaneous   Incisional bleed 1/23, bedside skin staples removed, controlled hemorrhage, remains intubated but off pressors, clinically declining.    Increased TPN to full calories in 2Ls    1. Protein calorie malnutrition being optimized with TPN: CHO [ ] gm.  AA [ ] gm. Lipids [ ] gm.  2.  Hyperglycemia managed with: [ ] units of regular insulin    3.  Check fluid balance daily.  Strict I/O  [ ] [ ]   4.  Daily BMP, Ionized Calcium, Magnesium and Phosphorous   5.  Triglycerides at initiation of TPN and monthly [ ] [ ]   6.  Pepcid in TPN for Gi prophylaxis  [ ]   I have seen and examined the patient, reviewed the laboratory and radiologic data and agree with the history, physical assessment and plan.  I reviewed and discussed with all consultants, house staff and PA's.  The note is edited where appropriate. The Nutrition Support Team (NST) discusses on an ongoing basis with the primary team and all consultants, House staff and PA's to have a permanent risk benefit analyses on all decisions.    I spent  45  minutes in total encounter; more than 50% of the visit counseling and coordinating nutrition care while providing diagnoses, assessment, and plan.

## 2019-01-25 NOTE — PROGRESS NOTE ADULT - ASSESSMENT
54y Female PMHx HTN, T2DM, asthma, depression with recent diagnosis of invasive ampullary adenocarcinoma of the CBD whom underwent an uneventful whipple 1/11/18. Patient has had an uptrending WBC since procedure, initially attributed to not restarting antibiotics for cholangitis, but presents today with abdominal tenderness/distention and tachycardia. Admitted to SICU for close hemodynamic monitoring, plan for RTOR today for abthera exchange v. abdominal wall closure    Neuro:  - fentanyl gtt for sedative analgesia  - daily sedation vacation  - monitor mental status     Resp:  - Hypoxemia improving  - q8 ABG, adjust vent settings prn  - CPAP trial as tolerated if patient remains stable then consider extubation     CV:  - monitor SBP, now off vasopressors   - IVF resuscitation as needed   - pending ECHO     GI:  - TPN Lipids will start today  - Continue protonix    Heme:   -monitor H/H  -lovenox vte ppx    Renal:  - Strict I&O, monitor lytes, replete as needed  - D5 1/2NS @ 100  - urine output remains low still low 20-30cc/hr  - Patient continues to be net positive despite attempt at diuresis with Lasix yesterday     ID:   - UA positive, will f/u urine cultures  - Meropenem/vancomycin for broadened coverage, no intra op cultures sent  - Continue micafungin   - blood culture 1/18 f/u- +coag negative staph  - f/u BCX from 1/19 and 1/20  - Will continue Abx for 3 days (1/26) and reassess clinically     Endo:  - diabetic, FS and low dose corrective regimen for hyperglycemia     Access: LIJ central line, 2 PIV, JOURDAN draining serosanguinous, ETT, R axillary arterial line     Dispo: SICU    SICU Dx: Malnutrition, invasive ampullary carcinoma s/p whipple, acute blood loss anemia 54y Female PMHx HTN, T2DM, asthma, depression with recent diagnosis of invasive ampullary adenocarcinoma of the CBD whom underwent an uneventful whipple 1/11/18. Patient has had an uptrending WBC since procedure, initially attributed to not restarting antibiotics for cholangitis, but presents today with abdominal tenderness/distention and tachycardia. Admitted to SICU for close hemodynamic monitoring, plan for RTOR today for abthera exchange v. abdominal wall closure    Neuro:  - fentanyl gtt for sedative analgesia  - Precedex  - Levo was d/c  - daily sedation vacation  - monitor mental status     Resp:  - Hypoxemia improving  - q8 ABG, adjust vent settings prn  - CPAP trial as tolerated if patient remains stable then consider extubation     CV:  - monitor SBP, now off vasopressors   - IVF resuscitation as needed   - pending ECHO     GI:  - TPN Lipids will start today  - Continue protonix    Heme:   -monitor H/H  -lovenox vte ppx    Renal:  - Strict I&O, monitor lytes, replete as needed  - D5 1/2NS @ 100  - urine output remains low still low 20-30cc/hr  - Patient continues to be net positive despite attempt at diuresis with Lasix yesterday   -Albumin 50@25%  -Lasix 40  -Bumex drip 20/100 @ 1 or 2    ID:   - UA positive, will f/u urine cultures  - Meropenem/vancomycin for broadened coverage, no intra op cultures sent  - Continue micafungin for at least 10 days total  - blood culture 1/18 f/u- +coag negative staph  - f/u BCX from 1/19 and 1/20  - Will continue Abx for 1 days (1/26) and reassess clinically     Endo:  - diabetic, FS and low dose corrective regimen for hyperglycemia     Access: LIJ central line, 2 PIV, JOURDAN draining serosanguinous, ETT, R axillary arterial line     Dispo: SICU    SICU Dx: Malnutrition, invasive ampullary carcinoma s/p whipple, acute blood loss anemia, Fluid overload 54y Female PMHx HTN, T2DM, asthma, depression with recent diagnosis of invasive ampullary adenocarcinoma of the CBD whom underwent an uneventful whipple 1/11/18. Patient has had an uptrending WBC since procedure, initially attributed to not restarting antibiotics for cholangitis, but presents today with abdominal tenderness/distention and tachycardia. Admitted to SICU for close hemodynamic monitoring, plan for RTOR today for abthera exchange v. abdominal wall closure    Neuro:  - fentanyl gtt for sedative analgesia will decrease dose  - Precedex - will decrease dose  - Levo was d/c  - daily sedation vacation  - monitor mental status     Resp:  - Hypoxemia improving  - q8 ABG, adjust vent settings prn  - CPAP trial as tolerated if patient remains stable then consider extubation     CV:  - monitor SBP, now off vasopressors   - IVF resuscitation as needed   - pending ECHO     GI:  - TPN Lipids will start today  - Continue protonix    Heme:   -monitor H/H  -lovenox vte ppx    Renal:  - Strict I&O, monitor lytes, replete as needed  - D5 1/2NS @ 100  - urine output remains low still low 20-30cc/hr  - Patient continues to be net positive despite attempt at diuresis with Lasix yesterday   -Albumin 50@25%  -Lasix 40  -Bumex drip 20/100 @ 1 or 2    ID:   - UA positive, will f/u urine cultures  - Meropenem/vancomycin for broadened coverage, no intra op cultures sent  - Continue micafungin for at least 10 days total  - blood culture 1/18 f/u- +coag negative staph  - f/u BCX from 1/19 and 1/20  - Will continue Abx for 1 days (1/26) and reassess clinically     Endo:  - diabetic, FS and low dose corrective regimen for hyperglycemia     Access: LIJ central line, 2 PIV, JOURDAN draining serosanguinous, ETT, R axillary arterial line     Dispo: SICU    SICU Dx: Malnutrition, invasive ampullary carcinoma s/p whipple, acute blood loss anemia, Fluid overload

## 2019-01-25 NOTE — PROGRESS NOTE ADULT - ATTENDING COMMENTS
54y Female PMHx HTN, T2DM, asthma, depression with recent diagnosis of invasive ampullary adenocarcinoma of the CBD whom underwent an uneventful whipple 1/11/18. Patient has had an uptrending WBC since procedure, initially attributed to not restarting antibiotics for cholangitis, but presents today with abdominal tenderness/distention and tachycardia. Admitted to SICU for close hemodynamic monitoring, plan for RTOR today for abthera exchange v. abdominal wall closure    Neuro:  - fentanyl gtt for sedative analgesia will decrease dose  - Precedex - will decrease dose  - Levo was d/c  - daily sedation vacation  - monitor mental status     Resp:  - Hypoxemia improving  - q8 ABG, adjust vent settings prn  - CPAP trial as tolerated if patient remains stable then consider extubation     CV:  - monitor SBP, now off vasopressors   - IVF resuscitation as needed   - pending ECHO     GI:  - TPN Lipids will start today  - Continue protonix    Heme:   -monitor H/H  -lovenox vte ppx    Renal:  - Strict I&O, monitor lytes, replete as needed  - D5 1/2NS @ 100  - urine output remains low still low 20-30cc/hr  - Patient continues to be net positive despite attempt at diuresis with Lasix yesterday   -Albumin 50@25%  -Lasix 40  -Bumex drip 20/100 @ 1 or 2    ID:   - UA positive, will f/u urine cultures  - Meropenem/vancomycin for broadened coverage, no intra op cultures sent  - Continue micafungin for at least 10 days total  - blood culture 1/18 f/u- +coag negative staph  - f/u BCX from 1/19 and 1/20  - Will continue Abx for 1 days (1/26) and reassess clinically     Endo:  - diabetic, FS and low dose corrective regimen for hyperglycemia     Access: LIJ central line, 2 PIV, JOURDAN draining serosanguinous, ETT, R axillary arterial line     Dispo: SICU    SICU Dx: Malnutrition, invasive ampullary carcinoma s/p whipple, acute blood loss anemia, Fluid overload  The patient is a critical care patient with life threatening hemodynamic and metabolic instability in SICU.  I have personally interviewed when possible and examined the patient, reviewed data and laboratory tests/x-rays and all pertinent electronic images.  I was physically present for the key portions of the evaluation and management (E/M) service provided.   The SICU team has a constant risk benefit analyzes discussion with the primary team, all consultants, House Staff and PA's on all decisions.  These diagnoses are unrelated to the surgical procedure noted above.  I meet with family if needed to get further history, discuss the case and make care decisions for this patient who might not be able to participate.  Time involved in performance of separately billable procedures was not counted toward my critical care time. There is no overlap.  I spent 55-75 minutes of critical care time for the diagnoses, assessment, plan and interventions.

## 2019-01-25 NOTE — PROGRESS NOTE ADULT - ASSESSMENT
Neuro:  - fentanyl gtt for sedative analgesia will decrease dose  - Precedex @ low dose  - Levo was d/c  - daily sedation vacation  - monitor mental status     Resp:  - Hypoxemia improving  - q8 ABG, adjust vent settings prn  - CPAP trial as tolerated if patient remains stable then consider extubation     CV:  - monitor SBP, now off vasopressors   - IVF resuscitation as needed   - pending ECHO     GI:  - TPN Lipids started  - Continue protonix    Heme:   -monitor H/H  -lovenox vte ppx    Renal:  - Strict I&O, monitor lytes, replete as needed  - D5 1/2NS @ 100  - urine output remains low still low 20-30cc/hr  - Patient continues to be net positive despite attempt at diuresis with Lasix yesterday   -Albumin 50@25%  -Lasix 40  -Bumex drip 20/100 @ 1 or 2  -Net negative 1326 I/O    ID:   - UA positive, will f/u urine cultures  - Meropenem/vancomycin for broadened coverage, no intra op cultures sent  - Continue micafungin for at least 10 days total  - blood culture 1/18 f/u- +coag negative staph  - f/u BCX from 1/19 and 1/20  - Will continue Abx for 1 days (1/26) and reassess clinically     Endo:  - diabetic, FS and low dose corrective regimen for hyperglycemia     Access: LIJ central line, 2 PIV, JOURDAN draining serosanguinous, ETT, R axillary arterial line     Dispo: SICU    SICU Dx: Malnutrition, invasive ampullary carcinoma s/p whipple, acute blood loss anemia, Fluid overload

## 2019-01-25 NOTE — PROGRESS NOTE ADULT - SUBJECTIVE AND OBJECTIVE BOX
SICU Daily Progress Note  =====================================================  Interval/Overnight Events:  Patient hypotensive requiring reinitiation of low dose vasopressors (Levophed). Remained on pressure support 13/10, oxygenating well. Precedex stopped secondary to hypotension. Patient remained unresponsive. Plastic surgery consulted for eventual abdominal closure.     54y Female PMHx HTN, T2DM, asthma, depression with recent diagnosis of invasive ampullary adenocarcinoma of the CBD admitted for cholangitis on 1/5, s/p ERCP 1/8, whipple 1/11, rapid response for GIB on floor 1/18 s/p MTP, RTOR s/p celiotomy, evacuation of hematoma, GDA bleed stopped and sutured, Abthera vac.   Closure of abdomen with strattice mest on 1/21, with inna drains and left lower JOURDAN drain in subcutaneous   Incisional bleed 1/23, bedside skin staples removed, controlled hemorrhage, remains intubated but off pressors, clinically declining.    Allergies: No Known Allergies    MEDICATIONS:   --------------------------------------------------------------------------------------  Neurologic Medications  dexmedetomidine Infusion 0.5 MICROgram(s)/kG/Hr IV Continuous <Continuous>  fentaNYL   Infusion. 0.5 MICROgram(s)/kG/Hr IV Continuous <Continuous>    Respiratory Medications    Cardiovascular Medications  norepinephrine Infusion 0.05 MICROgram(s)/kG/Min IV Continuous <Continuous>    Gastrointestinal Medications  fat emulsion (Fish Oil and Plant Based) 20% Infusion 6.25 mL/Hr IV Continuous <Continuous>  pantoprazole  Injectable 40 milliGRAM(s) IV Push daily  Parenteral Nutrition - Adult 1 Each TPN Continuous <Continuous>    Genitourinary Medications    Hematologic/Oncologic Medications  enoxaparin Injectable 40 milliGRAM(s) SubCutaneous daily    Antimicrobial/Immunologic Medications  meropenem  IVPB 1000 milliGRAM(s) IV Intermittent every 8 hours  meropenem  IVPB      micafungin IVPB      micafungin IVPB 100 milliGRAM(s) IV Intermittent every 24 hours  vancomycin  IVPB      vancomycin  IVPB 1000 milliGRAM(s) IV Intermittent every 12 hours    Endocrine/Metabolic Medications  insulin lispro (HumaLOG) corrective regimen sliding scale   SubCutaneous every 6 hours    Topical/Other Medications  artificial  tears Solution 1 Drop(s) Both EYES every 12 hours  chlorhexidine 0.12% Liquid 15 milliLiter(s) Oral Mucosa two times a day  chlorhexidine 4% Liquid 1 Application(s) Topical <User Schedule>    --------------------------------------------------------------------------------------    VITAL SIGNS, INS/OUTS (last 24 hours):  --------------------------------------------------------------------------------------  ((Insert SICU Vitals/Is+Os here))***  --------------------------------------------------------------------------------------    EXAM  NEUROLOGY  RASS: -3 to -4  Exam: sedated, minimally arousable.  Awakens but agitated when off sedation    HEENT  Exam: Normocephalic, atraumatic, EOMI.     RESPIRATORY  Exam: Lungs clear to auscultation, Normal expansion/effort.  Mechanical Ventilation: Mode: AC/ CMV (Assist Control/ Continuous Mandatory Ventilation), RR (machine): 14, TV (machine): 350, FiO2: 40, PEEP: 5, MAP: 6, PIP: 20    CARDIOVASCULAR  Exam: S1, S2.  Regular rate and rhythm. 3+ edema     GI/NUTRITION  Exam: Mildly distended, midline incision open with wet to dry dressing, JPx2  Diet: NPO    VASCULAR  Exam: Extremities warm, pink, well-perfused.    SKIN  Exam: Good skin turgor, no skin breakdown.     METABOLIC/FLUIDS/ELECTROLYTES  fat emulsion (Fish Oil and Plant Based) 20% Infusion 6.25 mL/Hr IV Continuous <Continuous>  Parenteral Nutrition - Adult 1 Each TPN Continuous <Continuous>      HEMATOLOGIC  [x] VTE Prophylaxis: enoxaparin Injectable 40 milliGRAM(s) SubCutaneous daily    Transfusions:	[] PRBC	[] Platelets		[] FFP	[] Cryoprecipitate    INFECTIOUS DISEASE  Antimicrobials/Immunologic Medications:  meropenem  IVPB 1000 milliGRAM(s) IV Intermittent every 8 hours  meropenem  IVPB      micafungin IVPB      micafungin IVPB 100 milliGRAM(s) IV Intermittent every 24 hours  vancomycin  IVPB      vancomycin  IVPB 1000 milliGRAM(s) IV Intermittent every 12 hours    Tubes/Lines/Drains   [x] Peripheral IV  [x] Central Venous Line     	[] R	[x] L	[x] IJ	[] Fem	[] SC	Date Placed:   [x] Arterial Line		[x] R	[] L	[] Fem	[] Rad	[x] Ax	Date Placed:   [] PICC		[] Midline		[] Mediport  [x] Urinary Catheter		Date Placed:   [x] Necessity of urinary, arterial, and venous catheters discussed    LABS  --------------------------------------------------------------------------------------  ((Insert SICU Labs here))***  --------------------------------------------------------------------------------------    OTHER LABORATORY:     IMAGING STUDIES:   CXR: SICU Daily Progress Note  =====================================================  Interval/Overnight Events:  Patient hypotensive requiring reinitiation of low dose vasopressors (Levophed). Remained on pressure support 13/10, oxygenating well. Precedex stopped secondary to hypotension. Patient remained unresponsive. Plastic surgery consulted for eventual abdominal closure. Pt's dressing was changed wet to dry with normal saline curlex. Skin barrier. Cover with abdominal binding. Must change twice daily.    54y Female PMHx HTN, T2DM, asthma, depression with recent diagnosis of invasive ampullary adenocarcinoma of the CBD admitted for cholangitis on 1/5, s/p ERCP 1/8, whipple 1/11, rapid response for GIB on floor 1/18 s/p MTP, RTOR s/p celiotomy, evacuation of hematoma, GDA bleed stopped and sutured, Abthera vac.   Closure of abdomen with strattice mest on 1/21, with inna drains and left lower JOURDAN drain in subcutaneous   Incisional bleed 1/23, bedside skin staples removed, controlled hemorrhage, remains intubated but off pressors, clinically declining.    Allergies: No Known Allergies    MEDICATIONS:   --------------------------------------------------------------------------------------  Neurologic Medications  dexmedetomidine Infusion 0.5 MICROgram(s)/kG/Hr IV Continuous <Continuous>  fentaNYL   Infusion. 0.5 MICROgram(s)/kG/Hr IV Continuous <Continuous>    Respiratory Medications    Cardiovascular Medications  norepinephrine Infusion 0.05 MICROgram(s)/kG/Min IV Continuous <Continuous>    Gastrointestinal Medications  fat emulsion (Fish Oil and Plant Based) 20% Infusion 6.25 mL/Hr IV Continuous <Continuous>  pantoprazole  Injectable 40 milliGRAM(s) IV Push daily  Parenteral Nutrition - Adult 1 Each TPN Continuous <Continuous>    Genitourinary Medications    Hematologic/Oncologic Medications  enoxaparin Injectable 40 milliGRAM(s) SubCutaneous daily    Antimicrobial/Immunologic Medications  meropenem  IVPB 1000 milliGRAM(s) IV Intermittent every 8 hours  meropenem  IVPB      micafungin IVPB      micafungin IVPB 100 milliGRAM(s) IV Intermittent every 24 hours  vancomycin  IVPB      vancomycin  IVPB 1000 milliGRAM(s) IV Intermittent every 12 hours    Endocrine/Metabolic Medications  insulin lispro (HumaLOG) corrective regimen sliding scale   SubCutaneous every 6 hours    Topical/Other Medications  artificial  tears Solution 1 Drop(s) Both EYES every 12 hours  chlorhexidine 0.12% Liquid 15 milliLiter(s) Oral Mucosa two times a day  chlorhexidine 4% Liquid 1 Application(s) Topical <User Schedule>    Vital Signs Last 24 Hrs  T(C): 36.9 (25 Jan 2019 08:00), Max: 38.5 (25 Jan 2019 00:00)  T(F): 98.5 (25 Jan 2019 08:00), Max: 101.3 (25 Jan 2019 00:00)  HR: 66 (25 Jan 2019 10:00) (66 - 82)  BP: --  BP(mean): --  RR: 8 (25 Jan 2019 10:00) (8 - 20)  SpO2: 100% (25 Jan 2019 10:00) (97% - 100%)    I&O's Detail    24 Jan 2019 07:01  -  25 Jan 2019 07:00  --------------------------------------------------------  IN:    Albumin 25%: 50 mL    dexmedetomidine Infusion: 7.2 mL    dexmedetomidine Infusion: 24.4 mL    dextrose 5% + sodium chloride 0.9%: 675 mL    fat emulsion (Fish Oil and Plant Based) 20% Infusion: 75 mL    fentaNYL Infusion.: 207 mL    fentaNYL Infusion.: 11.5 mL    IV PiggyBack: 900 mL    norepinephrine Infusion: 7.1 mL    TPN (Total Parenteral Nutrition): 588 mL  Total IN: 2545.2 mL    OUT:    Bulb: 85 mL    Bulb: 345 mL    Indwelling Catheter - Urethral: 1525 mL    Nasoenteral Tube: 300 mL  Total OUT: 2255 mL    Total NET: 290.2 mL      25 Jan 2019 07:01  -  25 Jan 2019 10:59  --------------------------------------------------------  IN:    dexmedetomidine Infusion: 40 mL    fentaNYL Infusion.: 40.2 mL    TPN (Total Parenteral Nutrition): 168 mL  Total IN: 248.2 mL    OUT:    Bulb: 70 mL    Indwelling Catheter - Urethral: 190 mL  Total OUT: 260 mL    Total NET: -11.8 mL                                9.1    14.29 )-----------( 177      ( 25 Jan 2019 00:20 )             27.4       01-25    131<L>  |  99  |  10  ----------------------------<  156<H>  3.7   |  22  |  0.59    Ca    7.3<L>      25 Jan 2019 00:20  Phos  2.5     01-25  Mg     2.1     01-25    TPro  5.1<L>  /  Alb  2.0<L>  /  TBili  4.4<H>  /  DBili  x   /  AST  74<H>  /  ALT  23  /  AlkPhos  229<H>  01-25      CAPILLARY BLOOD GLUCOSE      POCT Blood Glucose.: 156 mg/dL (25 Jan 2019 05:09)  POCT Blood Glucose.: 171 mg/dL (24 Jan 2019 23:12)  POCT Blood Glucose.: 155 mg/dL (24 Jan 2019 18:30)  POCT Blood Glucose.: 138 mg/dL (24 Jan 2019 11:49)      LIVER FUNCTIONS - ( 25 Jan 2019 00:20 )  Alb: 2.0 g/dL / Pro: 5.1 g/dL / ALK PHOS: 229 u/L / ALT: 23 u/L / AST: 74 u/L / GGT: x             PT/INR - ( 25 Jan 2019 00:20 )   PT: 15.8 SEC;   INR: 1.41          PTT - ( 25 Jan 2019 00:20 )  PTT:34.3 SEC        EXAM  NEUROLOGY  RASS: -3 to -4  Exam: sedated, minimally arousable.  Awakens but agitated when off sedation    HEENT  Exam: Normocephalic, atraumatic, EOMI.     RESPIRATORY  Exam: Lungs clear to auscultation, Normal expansion/effort.  Mechanical Ventilation: Mode: AC/ CMV (Assist Control/ Continuous Mandatory Ventilation), RR (machine): 14, TV (machine): 350, FiO2: 40, PEEP: 5, MAP: 6, PIP: 20    CARDIOVASCULAR  Exam: S1, S2.  Regular rate and rhythm. 3+ edema     GI/NUTRITION  Exam: Mildly distended, midline incision open with wet to dry dressing, JPx2  Diet: NPO    VASCULAR  Exam: Extremities warm, pink, well-perfused.    SKIN  Exam: Good skin turgor, no skin breakdown.     METABOLIC/FLUIDS/ELECTROLYTES  fat emulsion (Fish Oil and Plant Based) 20% Infusion 6.25 mL/Hr IV Continuous <Continuous>  Parenteral Nutrition - Adult 1 Each TPN Continuous <Continuous>      HEMATOLOGIC  [x] VTE Prophylaxis: enoxaparin Injectable 40 milliGRAM(s) SubCutaneous daily    Transfusions:	[] PRBC	[] Platelets		[] FFP	[] Cryoprecipitate    INFECTIOUS DISEASE  Antimicrobials/Immunologic Medications:  meropenem  IVPB 1000 milliGRAM(s) IV Intermittent every 8 hours  meropenem  IVPB      micafungin IVPB      micafungin IVPB 100 milliGRAM(s) IV Intermittent every 24 hours  vancomycin  IVPB      vancomycin  IVPB 1000 milliGRAM(s) IV Intermittent every 12 hours    Tubes/Lines/Drains   [x] Peripheral IV  [x] Central Venous Line     	[] R	[x] L	[x] IJ	[] Fem	[] SC	Date Placed:   [x] Arterial Line		[x] R	[] L	[] Fem	[] Rad	[x] Ax	Date Placed:   [] PICC		[] Midline		[] Mediport  [x] Urinary Catheter		Date Placed:   [x] Necessity of urinary, arterial, and venous catheters discussed

## 2019-01-25 NOTE — PROGRESS NOTE ADULT - SUBJECTIVE AND OBJECTIVE BOX
NUTRITION NOTE  [id number] SOUSA  ===============================    Interval events; Patient hypotensive requiring reinitiation of low dose vasopressors. Remained on pressure support 13/10, oxygenating well. Precedex stopped secondary to hypotension. Patient remained unresponsive.     VITAL SIGNS:  T(C): 36.9 (19 @ 08:00), Max: 38.5 (19 @ 00:00)  HR: 66 (19 @ 10:00) (66 - 82)  BP: --  ABP: 99/56 (19 @ 10:00) (88/47 - 180/94)  ABP(mean): 73 (19 @ 10:00) (64 - 129)  RR: 8 (19 @ 10:00) (8 - 20)  SpO2: 100% (19 @ 10:00) (97% - 100%)  CVP(mm Hg): --   @ 07:  -   @ 07:00  --------------------------------------------------------  IN: 2545.2 mL / OUT: 2255 mL / NET: 290.2 mL     @ 07: @ 10:56  --------------------------------------------------------  IN: 248.2 mL / OUT: 260 mL / NET: -11.8 mL      Glucose 156    Physical Exam:  Gen: Laying in bed, NAD, NGT in place, on vent  Resp: Unlabored breathing via ventilator  Abd: soft, ND, abdominal binder in place, RLQ JOURDAN with dark/bilious output, LUQ and LLQ JOURDAN SS, no rebound or guarding  Ext: WWP  Skin: No rashes    Metabolic/FLUIDS/ELECTROLYTES/NUTRITION:  Gastrointestinal Medications:  fat emulsion (Fish Oil and Plant Based) 20% Infusion 6.25 mL/Hr IV Continuous <Continuous>  fat emulsion (Fish Oil and Plant Based) 20% Infusion 12.5 mL/Hr IV Continuous <Continuous>  pantoprazole  Injectable 40 milliGRAM(s) IV Push daily  Parenteral Nutrition - Adult 1 Each TPN Continuous <Continuous>  Parenteral Nutrition - Adult 1 Each TPN Continuous <Continuous>    I&O's Detail  54y  Daily Weight in k.6 (2019 05:00)      131<L>  |  99  |  10  ----------------------------<  156<H>  3.7   |  22  |  0.59    Ca    7.3<L>      2019 00:20  Phos  2.5       Mg     2.1         TPro  5.1<L>  /  Alb  2.0<L>  /  TBili  4.4<H>  /  DBili  x   /  AST  74<H>  /  ALT  23  /  AlkPhos  229<H>        Diet: NPO       INFECTIOUS DISEASE:  Antimicrobials/Immunologic Medications:  meropenem  IVPB 1000 milliGRAM(s) IV Intermittent every 8 hours  meropenem  IVPB      micafungin IVPB      micafungin IVPB 100 milliGRAM(s) IV Intermittent every 24 hours  vancomycin  IVPB      vancomycin  IVPB 1000 milliGRAM(s) IV Intermittent every 12 hours    RECENT CULTURES:   @ 09:31 URINE CATHETER          @ 02:28 BLOOD         NO ORGANISMS ISOLATED  NO ORGANISMS ISOLATED AT 96 HOURS        OTHER MEDICATIONS:  Endocrine/Metabolic Medications:  insulin lispro (HumaLOG) corrective regimen sliding scale   SubCutaneous every 6 hours    Genitourinary Medications:    Topical/Other Medications:  artificial  tears Solution 1 Drop(s) Both EYES every 12 hours  chlorhexidine 0.12% Liquid 15 milliLiter(s) Oral Mucosa two times a day  chlorhexidine 4% Liquid 1 Application(s) Topical <User Schedule>    ASSESSMENT/PLAN:  54y Female PMHx HTN, T2DM, asthma, depression with recent diagnosis of invasive ampullary adenocarcinoma of the CBD admitted for cholangitis on , s/p ERCP , whipple , rapid response for GIB on floor  s/p MTP, RTOR s/p celiotomy, evacuation of hematoma, GDA bleed stopped and sutured, Abthera vac.   Closure of abdomen with strattice mest on , with inna drains and left lower JOURDAN drain in subcutaneous   Incisional bleed , bedside skin staples removed, controlled hemorrhage, remains intubated but off pressors, clinically declining.    Increased TPN to full calories in 2Ls    1. Protein calorie malnutrition being optimized with TPN: CHO [ ] gm.  AA [ ] gm. Lipids [ ] gm.  2.  Hyperglycemia managed with: [ ] units of regular insulin    3.  Check fluid balance daily.  Strict I/O  [ ] [ ]   4.  Daily BMP, Ionized Calcium, Magnesium and Phosphorous   5.  Triglycerides at initiation of TPN and monthly [ ] [ ]   6.  Pepcid in TPN for Gi prophylaxis  [ ]

## 2019-01-25 NOTE — PROGRESS NOTE ADULT - SUBJECTIVE AND OBJECTIVE BOX
SUBJECTIVE:  No overnight events.     OBJECTIVE:     ** VITAL SIGNS / I&O's **    T(C): 36.9 (01-25-19 @ 04:00), Max: 38.5 (01-25-19 @ 00:00)  T(F): 98.4 (01-25-19 @ 04:00), Max: 101.3 (01-25-19 @ 00:00)  HR: 73 (01-25-19 @ 06:49) (66 - 82)  BP: --  RR: 12 (01-25-19 @ 05:00) (10 - 20)  SpO2: 100% (01-25-19 @ 06:49) (96% - 100%)      24 Jan 2019 07:01  -  25 Jan 2019 07:00  --------------------------------------------------------  IN:    Albumin 25%: 50 mL    dexmedetomidine Infusion: 7.2 mL    dexmedetomidine Infusion: 24.4 mL    dextrose 5% + sodium chloride 0.9%: 675 mL    fat emulsion (Fish Oil and Plant Based) 20% Infusion: 75 mL    fentaNYL Infusion.: 11.5 mL    fentaNYL Infusion.: 207 mL    IV PiggyBack: 900 mL    norepinephrine Infusion: 7.1 mL    TPN (Total Parenteral Nutrition): 588 mL  Total IN: 2545.2 mL    OUT:    Bulb: 85 mL    Bulb: 345 mL    Indwelling Catheter - Urethral: 1525 mL    Nasoenteral Tube: 300 mL  Total OUT: 2255 mL    Total NET: 290.2 mL          ** PHYSICAL EXAM **    -- CONSTITUTIONAL: Sedated.   -- HEENT: Intubated.  -- CARDIOVASCULAR: Regular rate and rhythm. S1, S2.  -- RESPIRATORY: Bilateral breath sounds.   -- ABDOMEN: Open abdominal wound with exposed Strattice mesh. Small amount of granulation tissue. No evidence of drainage or malodor. External tissue expander device in place. Packing changed.    ** LABS **                 9.1    14.29  )----------(  177       ( 25 Jan 2019 00:20 )               27.4      131    |  99     |  10     ----------------------------<  156        ( 25 Jan 2019 00:20 )  3.7     |  22     |  0.59     Ca    7.3        ( 25 Jan 2019 00:20 )  Phos  2.5       ( 25 Jan 2019 00:20 )  Mg     2.1       ( 25 Jan 2019 00:20 )    TPro  5.1    /  Alb  2.0    /  TBili  4.4    /  DBili  x      /  AST  74     /  ALT  23     /  AlkPhos  229    ( 25 Jan 2019 00:20 )    PT/INR -  15.8 SEC / 1.41    ( 25 Jan 2019 00:20 )       PTT -  34.3 SEC   ( 25 Jan 2019 00:20 )  CAPILLARY BLOOD GLUCOSE

## 2019-01-26 LAB
ALBUMIN SERPL ELPH-MCNC: 2.1 G/DL — LOW (ref 3.3–5)
ALP SERPL-CCNC: 254 U/L — HIGH (ref 40–120)
ALT FLD-CCNC: 29 U/L — SIGNIFICANT CHANGE UP (ref 4–33)
ANION GAP SERPL CALC-SCNC: 12 MMO/L — SIGNIFICANT CHANGE UP (ref 7–14)
AST SERPL-CCNC: 100 U/L — HIGH (ref 4–32)
BACTERIA BLD CULT: SIGNIFICANT CHANGE UP
BASE EXCESS BLDA CALC-SCNC: 7.6 MMOL/L — SIGNIFICANT CHANGE UP
BILIRUB DIRECT SERPL-MCNC: 3 MG/DL — HIGH (ref 0.1–0.2)
BILIRUB SERPL-MCNC: 4 MG/DL — HIGH (ref 0.2–1.2)
BUN SERPL-MCNC: 11 MG/DL — SIGNIFICANT CHANGE UP (ref 7–23)
CA-I BLD-SCNC: 0.95 MMOL/L — LOW (ref 1.03–1.23)
CALCIUM SERPL-MCNC: 7.7 MG/DL — LOW (ref 8.4–10.5)
CHLORIDE SERPL-SCNC: 92 MMOL/L — LOW (ref 98–107)
CO2 SERPL-SCNC: 27 MMOL/L — SIGNIFICANT CHANGE UP (ref 22–31)
CREAT SERPL-MCNC: 0.65 MG/DL — SIGNIFICANT CHANGE UP (ref 0.5–1.3)
GLUCOSE SERPL-MCNC: 167 MG/DL — HIGH (ref 70–99)
HCO3 BLDA-SCNC: 31 MMOL/L — HIGH (ref 22–26)
HCT VFR BLD CALC: 28.6 % — LOW (ref 34.5–45)
HGB BLD-MCNC: 9.2 G/DL — LOW (ref 11.5–15.5)
MAGNESIUM SERPL-MCNC: 2.1 MG/DL — SIGNIFICANT CHANGE UP (ref 1.6–2.6)
MCHC RBC-ENTMCNC: 27.8 PG — SIGNIFICANT CHANGE UP (ref 27–34)
MCHC RBC-ENTMCNC: 32.2 % — SIGNIFICANT CHANGE UP (ref 32–36)
MCV RBC AUTO: 86.4 FL — SIGNIFICANT CHANGE UP (ref 80–100)
NRBC # FLD: 0.02 K/UL — LOW (ref 25–125)
PCO2 BLDA: 41 MMHG — SIGNIFICANT CHANGE UP (ref 32–48)
PH BLDA: 7.49 PH — HIGH (ref 7.35–7.45)
PHOSPHATE SERPL-MCNC: 2.5 MG/DL — SIGNIFICANT CHANGE UP (ref 2.5–4.5)
PLATELET # BLD AUTO: 201 K/UL — SIGNIFICANT CHANGE UP (ref 150–400)
PMV BLD: 10.9 FL — SIGNIFICANT CHANGE UP (ref 7–13)
PO2 BLDA: 134 MMHG — HIGH (ref 83–108)
POTASSIUM SERPL-MCNC: 3.6 MMOL/L — SIGNIFICANT CHANGE UP (ref 3.5–5.3)
POTASSIUM SERPL-SCNC: 3.6 MMOL/L — SIGNIFICANT CHANGE UP (ref 3.5–5.3)
PROT SERPL-MCNC: 5.6 G/DL — LOW (ref 6–8.3)
RBC # BLD: 3.31 M/UL — LOW (ref 3.8–5.2)
RBC # FLD: 18.1 % — HIGH (ref 10.3–14.5)
SAO2 % BLDA: 99 % — SIGNIFICANT CHANGE UP (ref 95–99)
SODIUM SERPL-SCNC: 131 MMOL/L — LOW (ref 135–145)
WBC # BLD: 15.04 K/UL — HIGH (ref 3.8–10.5)
WBC # FLD AUTO: 15.04 K/UL — HIGH (ref 3.8–10.5)

## 2019-01-26 PROCEDURE — 99292 CRITICAL CARE ADDL 30 MIN: CPT

## 2019-01-26 PROCEDURE — 99291 CRITICAL CARE FIRST HOUR: CPT

## 2019-01-26 RX ORDER — POTASSIUM CHLORIDE 20 MEQ
20 PACKET (EA) ORAL ONCE
Qty: 0 | Refills: 0 | Status: COMPLETED | OUTPATIENT
Start: 2019-01-26 | End: 2019-01-26

## 2019-01-26 RX ORDER — I.V. FAT EMULSION 20 G/100ML
12.5 EMULSION INTRAVENOUS
Qty: 30 | Refills: 0 | Status: DISCONTINUED | OUTPATIENT
Start: 2019-01-26 | End: 2019-01-28

## 2019-01-26 RX ORDER — ACETAMINOPHEN 500 MG
1000 TABLET ORAL ONCE
Qty: 0 | Refills: 0 | Status: COMPLETED | OUTPATIENT
Start: 2019-01-26 | End: 2019-01-26

## 2019-01-26 RX ORDER — ELECTROLYTE SOLUTION,INJ
1 VIAL (ML) INTRAVENOUS
Qty: 0 | Refills: 0 | Status: DISCONTINUED | OUTPATIENT
Start: 2019-01-26 | End: 2019-01-26

## 2019-01-26 RX ADMIN — Medication 400 MILLIGRAM(S): at 17:52

## 2019-01-26 RX ADMIN — Medication 50 GRAM(S): at 03:13

## 2019-01-26 RX ADMIN — I.V. FAT EMULSION 12.5 ML/HR: 20 EMULSION INTRAVENOUS at 16:01

## 2019-01-26 RX ADMIN — Medication 1 APPLICATION(S): at 17:51

## 2019-01-26 RX ADMIN — MEROPENEM 100 MILLIGRAM(S): 1 INJECTION INTRAVENOUS at 22:16

## 2019-01-26 RX ADMIN — MEROPENEM 100 MILLIGRAM(S): 1 INJECTION INTRAVENOUS at 13:52

## 2019-01-26 RX ADMIN — Medication 250 MILLIGRAM(S): at 17:11

## 2019-01-26 RX ADMIN — Medication 1 DROP(S): at 05:08

## 2019-01-26 RX ADMIN — Medication 250 MILLIGRAM(S): at 05:08

## 2019-01-26 RX ADMIN — Medication 2: at 22:15

## 2019-01-26 RX ADMIN — Medication 2: at 13:52

## 2019-01-26 RX ADMIN — CHLORHEXIDINE GLUCONATE 15 MILLILITER(S): 213 SOLUTION TOPICAL at 05:07

## 2019-01-26 RX ADMIN — Medication 1000 MILLIGRAM(S): at 02:00

## 2019-01-26 RX ADMIN — ENOXAPARIN SODIUM 40 MILLIGRAM(S): 100 INJECTION SUBCUTANEOUS at 11:48

## 2019-01-26 RX ADMIN — MEROPENEM 100 MILLIGRAM(S): 1 INJECTION INTRAVENOUS at 05:08

## 2019-01-26 RX ADMIN — FENTANYL CITRATE 2.87 MICROGRAM(S)/KG/HR: 50 INJECTION INTRAVENOUS at 08:23

## 2019-01-26 RX ADMIN — DEXMEDETOMIDINE HYDROCHLORIDE IN 0.9% SODIUM CHLORIDE 7.17 MICROGRAM(S)/KG/HR: 4 INJECTION INTRAVENOUS at 08:22

## 2019-01-26 RX ADMIN — MICAFUNGIN SODIUM 105 MILLIGRAM(S): 100 INJECTION, POWDER, LYOPHILIZED, FOR SOLUTION INTRAVENOUS at 22:15

## 2019-01-26 RX ADMIN — Medication 4: at 17:52

## 2019-01-26 RX ADMIN — CHLORHEXIDINE GLUCONATE 15 MILLILITER(S): 213 SOLUTION TOPICAL at 17:11

## 2019-01-26 RX ADMIN — Medication 2: at 05:08

## 2019-01-26 RX ADMIN — PANTOPRAZOLE SODIUM 40 MILLIGRAM(S): 20 TABLET, DELAYED RELEASE ORAL at 11:48

## 2019-01-26 RX ADMIN — Medication 2: at 10:23

## 2019-01-26 RX ADMIN — Medication 100 MILLIEQUIVALENT(S): at 05:07

## 2019-01-26 RX ADMIN — Medication 100 MILLIEQUIVALENT(S): at 04:00

## 2019-01-26 RX ADMIN — Medication 1 EACH: at 17:01

## 2019-01-26 RX ADMIN — Medication 63.75 MILLIMOLE(S): at 05:08

## 2019-01-26 RX ADMIN — Medication 400 MILLIGRAM(S): at 01:00

## 2019-01-26 RX ADMIN — CHLORHEXIDINE GLUCONATE 1 APPLICATION(S): 213 SOLUTION TOPICAL at 10:23

## 2019-01-26 RX ADMIN — Medication 1 DROP(S): at 17:11

## 2019-01-26 NOTE — PROGRESS NOTE ADULT - ASSESSMENT
Assessment and Plan:   · Assessment		  Neuro:  - fent and precedex tbc, sedation vacation daily to assess neurostatus  - intermittent requirement for levo, will continue PRN    Resp:  - ct ABG monitoring as per routine  - adjust vent settings prn depending upon pt's response   - CPAP trial as tolerated if patient remains stable then consider extubation  - daily CXR and ABG in AM      CV:  - vitals q1h, ct flotrac and A line monitoring, intermittent vasopressor     GI:  - ct TPN through dedicated port  - Continue protonix till extubated   - daily electrolyte with ionized Ca as on TPN    Renal:  - Strict I&O, monitor lytes, replete as needed  - IVF on hold for now   - aggressive diuresis yesterday with net neg 3L urine with bumex gtt, no diuresis today, tomorrow may plan for diamox     Endo:  - diabetic, FS q6h and low dose corrective regimen for hyperglycemia, needs better control   - TPN with insulin 10 U from today, ct to monitor     Heme:   -monitor H/H, stable   -ct lovenox vte ppx    ID:   - blood cultures 1/5 grew E coli, repeat cultures have been negative except blood cx 1/18 grew coag negative staph in one bottle (possible contamination), repeat negative  - ct Meropenem/vancomycin for broadened coverage for now as pt continuously febrile and WBC elevated,   - UA positive, urine cx 1/21 candida, micafungin since 1/23, stop on 2/1  -no intra op fluid cultures sent  - intubated since 1/18  - Swanson to be continued for UOP monitoring in critically ill pt   - abdominal incision with external tissue expander device in place 1/24    Access: LIJ central line, 2 PIV, R axillary arterial line    Dispo: SICU    SICU Dx: Malnutrition, invasive ampullary carcinoma s/p whipple, acute blood loss anemia, Fluid overload

## 2019-01-26 NOTE — PROGRESS NOTE ADULT - ATTENDING COMMENTS
54y Female PMHx HTN, T2DM, asthma, depression with recent diagnosis of invasive ampullary adenocarcinoma of the CBD whom underwent an uneventful whipple 1/11/18. Patient has had an uptrending WBC since procedure, initially attributed to not restarting antibiotics for cholangitis, but experienced abdominal tenderness/distention and tachycardia yesterday and was transferred to SICU for close hemodynamic monitoring. Now s/p Celiotomy, evacuation of hematoma, cessation of gastroduodenal artery bleeding, and placement of Abthera vac 1/18. now s/p RTOR for abdominal washout 1/21    PLAN:  -continue care per SICU  -TPN  -Added 10 units of Insulin to TPN           1. Protein calorie malnutrition being optimized with TPN: CHO [ ] gm.  AA [ ] gm. Lipids [ ] gm.  2.  Hyperglycemia managed with: [ ] units of regular insulin    3.  Check fluid balance daily.  Strict I/O  [ ] [ ]   4.  Daily BMP, Ionized Calcium, Magnesium and Phosphorous   5.  Triglycerides at initiation of TPN and monthly [ ] [ ]   6.  Pepcid in TPN for Gi prophylaxis  [ ]   I have seen and examined the patient, reviewed the laboratory and radiologic data and agree with the history, physical assessment and plan.  I reviewed and discussed with all consultants, house staff and PA's.  The note is edited where appropriate. The Nutrition Support Team (NST) discusses on an ongoing basis with the primary team and all consultants, House staff and PA's to have a permanent risk benefit analyses on all decisions.    I spent  45  minutes in total encounter; more than 50% of the visit counseling and coordinating nutrition care while providing diagnoses, assessment, and plan.

## 2019-01-26 NOTE — PROGRESS NOTE ADULT - SUBJECTIVE AND OBJECTIVE BOX
Interval Events:  off pressors, continues to be on dexmed 0.5 and fent 1.5, no diuresis today, monitor and Diamox tomorrow    HPI:  54y Female PMHx HTN, T2DM, asthma, depression with recent diagnosis of invasive ampullary adenocarcinoma of the CBD admitted for cholangitis on 1/5, s/p ERCP 1/8, whipple 1/11, rapid response for GIB on floor 1/18 s/p MTP, RTOR s/p celiotomy, evacuation of hematoma, GDA bleed stopped and sutured, Abthera vac.   Closure of abdomen with strattice mesh on 1/21, with inna drains and left lower JOURDAN drain in subcutaneous   Incisional bleed 1/23, bedside skin staples removed, JOURDAN drain removed, hemorrhage controlled, for continuation of ICU care       SUBJECTIVE/ROS:  [ ] A ten-point review of systems was otherwise negative except as noted.  [ ] Due to altered mental status/intubation, subjective information were not able to be obtained from the patient. History was obtained, to the extent possible, from review of the chart and collateral sources of information.      NEURO  RASS: -3 to -4  Exam: sedated, minimally arousable.  Awakens but agitated when off sedation  Meds: dexmedetomidine Infusion 0.5 MICROgram(s)/kG/Hr IV Continuous <Continuous>  fentaNYL   Infusion. 1.5 MICROgram(s)/kG/Hr IV Continuous <Continuous>    [x] Adequacy of sedation and pain control has been assessed and adjusted    HEENT  Exam: Normocephalic, atraumatic, EOMI.     RESPIRATORY  ICU Vital Signs Last 24 Hrs  T(C): 38.8 (26 Jan 2019 12:00), Max: 38.9 (26 Jan 2019 00:00)  T(F): 101.9 (26 Jan 2019 12:00), Max: 102 (26 Jan 2019 00:00)    RR: 14 (26 Jan 2019 12:00) (8 - 19)  SpO2: 100% (26 Jan 2019 12:07) (93% - 100%)    Wt(kg): --  Exam: unlabored, clear to auscultation bilaterally  Mode: CPAP with PS  FiO2: 50  PEEP: 10  PS: 13  MAP: 12.3    ABG - ( 26 Jan 2019 02:30 )  pH, Arterial: 7.49  pH, Blood: x     /  pCO2: 41    /  pO2: 134   / HCO3: 31    / Base Excess: 7.6   /  SaO2: 99.0      [N/A] Extubation Readiness Assessed  Meds:       CARDIOVASCULAR  HR: 79 (26 Jan 2019 12:07) (62 - 93)  BP: 138/71 (26 Jan 2019 08:33) (138/71 - 138/71)  BP(mean): 88 (26 Jan 2019 08:33) (88 - 88)  ABP: 111/56 (26 Jan 2019 12:00) (89/50 - 159/84)  ABP(mean): 77 (26 Jan 2019 12:00) (65 - 115)    Exam: S1, S2.  Regular rate and rhythm. 3+ edema     Exam: regular rate and rhythm  Cardiac Rhythm: sinus  Perfusion     [x]Adequate   [ ]Inadequate  Mentation   [x]Normal       [ ]Reduced  Extremities  [x]Warm         [ ]Cool  Volume Status [ ]Hypervolemic [x]Euvolemic [ ]Hypovolemic  Meds:       GI/NUTRITION   Mildly distended, midline incision open with wet to dry dressing, JPx2  Diet, NPO (01-18-19 @ 17:31), Pt on TPN  Meds: fat emulsion (Fish Oil and Plant Based) 20% Infusion 6.25 mL/Hr IV Continuous <Continuous>  fat emulsion (Fish Oil and Plant Based) 20% Infusion 12.5 mL/Hr IV Continuous <Continuous>  Parenteral Nutrition - Adult 1 Each TPN Continuous <Continuous>  Parenteral Nutrition - Adult 1 Each TPN Continuous <Continuous    Meds: pantoprazole  Injectable 40 milliGRAM(s) IV Push daily      GENITOURINARY  I&O's Detail    25 Jan 2019 07:01  -  26 Jan 2019 07:00  --------------------------------------------------------  IN:    Albumin 25%: 50 mL    bumetanide Infusion: 35 mL    dexmedetomidine Infusion: 147.8 mL    fat emulsion (Fish Oil and Plant Based) 20% Infusion: 180 mL    fentaNYL Infusion.: 204 mL    IV PiggyBack: 1600 mL    norepinephrine Infusion: 39 mL    TPN (Total Parenteral Nutrition): 1664 mL  Total IN: 3919.8 mL    OUT:    Bulb: 255 mL    Bulb: 75 mL    Indwelling Catheter - Urethral: 7330 mL    Nasoenteral Tube: 200 mL  Total OUT: 7860 mL    Total NET: -3940.2 mL      26 Jan 2019 07:01  -  26 Jan 2019 13:19  --------------------------------------------------------  IN:    dexmedetomidine Infusion: 43.2 mL    fentaNYL Infusion.: 51.6 mL    TPN (Total Parenteral Nutrition): 498 mL  Total IN: 592.8 mL    OUT:    Bulb: 70 mL    Bulb: 90 mL    Indwelling Catheter - Urethral: 540 mL  Total OUT: 700 mL    Total NET: -107.2 mL    01-26    131<L>  |  92<L>  |  11  ----------------------------<  167<H>  3.6   |  27  |  0.65    Ca    7.7<L>      26 Jan 2019 02:30  Phos  2.5     01-26  Mg     2.1     01-26    TPro  5.6<L>  /  Alb  2.1<L>  /  TBili  4.0<H>  /  DBili  3.0<H>  /  AST  100<H>  /  ALT  29  /  AlkPhos  254<H>  01-26        [x ] Sinha catheter, indication: UOP monitoring in critically ill pt       HEMATOLOGIC  Meds: enoxaparin Injectable 40 milliGRAM(s) SubCutaneous daily    [x] VTE Prophylaxis                          9.2    15.04 )-----------( 201      ( 26 Jan 2019 02:30 )             28.6     PT/INR - ( 25 Jan 2019 00:20 )   PT: 15.8 SEC;   INR: 1.41          PTT - ( 25 Jan 2019 00:20 )  PTT:34.3 SEC    INFECTIOUS DISEASES  WBC Count: 15.04 K/uL (01-26 @ 02:30)  WBC Count: 14.29 K/uL (01-25 @ 00:20)  WBC Count: 13.68 K/uL (01-24 @ 16:15)  WBC Count: 17.82 K/uL (01-24 @ 04:20)  WBC Count: 17.23 K/uL (01-23 @ 22:55)    RECENT CULTURES:  01-21 @ 09:31 URINE CATHETER           01-21 @ 02:28 BLOOD         NO ORGANISMS ISOLATED    01-19 @ 22:25 BLOOD PERIPHERAL         NO ORGANISMS ISOLATED    01-18 @ 19:36 BLOOD PERIPHERAL BLOOD CULTURE PCR  Staphylococcus sp.,coag neg        ***Blood Panel PCR results on this specimen are available  approximately 3 hours after the Gram stain result***  Gram stain, PCR, and/or culture results may not always  correspond due to difference in methodologies  ------------------------------------------------------------  This PCR assay was performed using [employer].  The  following targets are tested for:  Enterococcus, vancomycin  resistant enterococci, Listeria monocytogenes,  coagulase  negative staphylococci, S. aureus, methicillin resistant S.  aureus, Streptococcus agalactiae (Group B), S. pneumoniae,  S. pyogenes (Group A), Acinetobacter baumannii, Enterobacter  cloacae, E. coli, Klebsiella oxytoca, K. pneumoniae, Proteus  sp., Serratia marcescens, Haemophilus influenzae, Neisseria  meningitidis, Pseudomonas aeruginosa, Candida albicans, C.  glabrata, C. krusei, C. parapsilosis, C. tropicalis and the  KPC resistance gene.  **NOTE: Due to technical problems, Proteus sp. will NOT be  reported as part of the BCID paneluntil further notice.    01-14 @ 01:20 BLOOD VENOUS         NO ORGANISMS ISOLATED      Meds: meropenem  IVPB 1000 milliGRAM(s) IV Intermittent every 8 hours  meropenem  IVPB      micafungin IVPB      micafungin IVPB 100 milliGRAM(s) IV Intermittent every 24 hours  vancomycin  IVPB      vancomycin  IVPB 1000 milliGRAM(s) IV Intermittent every 12 hours        ENDOCRINE  CAPILLARY BLOOD GLUCOSE      POCT Blood Glucose.: 196 mg/dL (26 Jan 2019 10:20)  POCT Blood Glucose.: 195 mg/dL (26 Jan 2019 05:06)  POCT Blood Glucose.: 205 mg/dL (25 Jan 2019 23:46)  POCT Blood Glucose.: 204 mg/dL (25 Jan 2019 17:27)        Meds: insulin lispro (HumaLOG) corrective regimen sliding scale   SubCutaneous every 6 hours        ACCESS DEVICES:  [ ] Peripheral IV  [ x] Central Venous Line	[ ] R	[x ] L	[x ] IJ	[ ] Fem	[ ] SC	Placed:   [x ] Arterial Line		[x ] R	[ ] L	[ ] Fem	[ ] Rad	[x ] Ax	Placed:   [ ] PICC:					[ ] Mediport  [x ] Urinary Catheter, Date Placed: sinha  [x] Necessity of urinary, arterial, and venous catheters discussed    OTHER MEDICATIONS:  artificial  tears Solution 1 Drop(s) Both EYES every 12 hours  chlorhexidine 0.12% Liquid 15 milliLiter(s) Oral Mucosa two times a day  chlorhexidine 4% Liquid 1 Application(s) Topical <User Schedule>

## 2019-01-26 NOTE — PROGRESS NOTE ADULT - ATTENDING COMMENTS
54y Female PMHx HTN, T2DM, asthma, depression with recent diagnosis of invasive ampullary adenocarcinoma of the CBD admitted for cholangitis on 1/5, s/p ERCP 1/8, whipple 1/11, rapid response for GIB on floor 1/18 s/p MTP, RTOR s/p celiotomy, evacuation of hematoma, GDA bleed stopped and sutured, Abthera vac.   Closure of abdomen with strattice mest on 1/21, with inna drains and left lower JOURDAN drain in subcutaneous   Incisional bleed 1/23, bedside skin staples removed, controlled hemorrhage, remains intubated but off pressors,   Neuro:  - fentanyl gtt for sedative analgesia will decrease dose  - Precedex @ low dose  - intermittent requirement for levo, will continue PRN  - daily sedation vacation  - monitor mental status     Resp:  - Hypoxemia improving  - q8 ABG, adjust vent settings prn  - CPAP trial as tolerated if patient remains stable then consider extubation     CV:  - monitor SBP, no vasopressor requirement  - fluid overloaded, will agressively diurese  - pending ECHO     GI:  - TPN Lipids started  - Continue protonix    Heme:   -monitor H/H  -lovenox vte ppx    Renal:  - Strict I&O, monitor lytes, replete as needed  - D5 1/2NS @ 100  - aggressive diuresis yesterday with net neg 3L urine with bumex gtt    ID:   - UA positive, will f/u urine cultures  - Meropenem/vancomycin for broadened coverage, no intra op cultures sent  - Continue micafungin for at least 10 days total  - blood culture 1/18 f/u- +coag negative staph  - f/u BCX from 1/19 and 1/20  - plan to d/c ronak/vanc today    Endo:  - diabetic, FS and low dose corrective regimen for hyperglycemia     Access: LIJ central line, 2 PIV, JOURDAN draining serosanguinous, ETT, R axillary arterial line     Dispo: SICU    SICU Dx: Malnutrition, invasive ampullary carcinoma s/p whipple, acute blood loss anemia, Fluid overload.  The patient is a critical care patient with life threatening hemodynamic and metabolic instability in SICU.  I have personally interviewed when possible and examined the patient, reviewed data and laboratory tests/x-rays and all pertinent electronic images.  I was physically present for the key portions of the evaluation and management (E/M) service provided.   The SICU team has a constant risk benefit analyzes discussion with the primary team, all consultants, House Staff and PA's on all decisions.  These diagnoses are unrelated to the surgical procedure noted above.  I meet with family if needed to get further history, discuss the case and make care decisions for this patient who might not be able to participate.  Time involved in performance of separately billable procedures was not counted toward my critical care time. There is no overlap.  I spent 55-75 minutes of critical care time for the diagnoses, assessment, plan and interventions.

## 2019-01-26 NOTE — PROGRESS NOTE ADULT - SUBJECTIVE AND OBJECTIVE BOX
NUTRITION NOTE  [id number] SOUSA  ===============================      Interval/Overnight Events: Mildly hypotensive SBP 80, Pt was started on small dose levo.     VITAL SIGNS:  T(C): 38 (19 @ 08:00), Max: 38.9 (19 @ 00:00)  HR: 71 (19 10:00) (62 - 93)  BP: 138/71 (19 @ 08:33) (138/71 - 138/71)  ABP: 100/55 (19 10:00) (89/50 - 204/108)  ABP(mean): 73 (19 @ 10:00) (65 - 147)  RR: 10 (19 10:00) (8 - 24)  SpO2: 100% (19 10:00) (93% - 100%)  CVP(mm Hg): --   @ 07: @ 07:00  --------------------------------------------------------  IN: 3919.8 mL / OUT: 7860 mL / NET: -3940.2 mL     @ 07: @ 10:52  --------------------------------------------------------  IN: 395.2 mL / OUT: 570 mL / NET: -174.8 mL      Glucose 196-205    ** PHYSICAL EXAM **    -- CONSTITUTIONAL: Alert, NAD  -- PULMONARY: non-labored respirations  -- ABDOMEN: soft, ND, abdominal binder in place, RLQ JOURDAN with dark/bilious output, LUQ and LLQ JOURDAN SS, no rebound or guarding    Metabolic/FLUIDS/ELECTROLYTES/NUTRITION:  Gastrointestinal Medications:  dextrose 5%. 1000 milliLiter(s) IV Continuous <Continuous>  fat emulsion (Fish Oil and Plant Based) 20% Infusion 12.5 mL/Hr IV Continuous <Continuous>  fat emulsion (Fish Oil and Plant Based) 20% Infusion 6.25 mL/Hr IV Continuous <Continuous>  fat emulsion (Fish Oil and Plant Based) 20% Infusion 12.5 mL/Hr IV Continuous <Continuous>  pantoprazole  Injectable 40 milliGRAM(s) IV Push daily  Parenteral Nutrition - Adult 1 Each TPN Continuous <Continuous>  Parenteral Nutrition - Adult 1 Each TPN Continuous <Continuous>    I&O's Detail  54y  Daily Weight in k.1 (2019 05:00)      131<L>  |  92<L>  |  11  ----------------------------<  167<H>  3.6   |  27  |  0.65    Ca    7.7<L>      2019 02:30  Phos  2.5       Mg     2.1         TPro  5.6<L>  /  Alb  2.1<L>  /  TBili  4.0<H>  /  DBili  3.0<H>  /  AST  100<H>  /  ALT  29  /  AlkPhos  254<H>        Diet: NPO       INFECTIOUS DISEASE:  Antimicrobials/Immunologic Medications:  meropenem  IVPB 1000 milliGRAM(s) IV Intermittent every 8 hours  meropenem  IVPB      micafungin IVPB      micafungin IVPB 100 milliGRAM(s) IV Intermittent every 24 hours  vancomycin  IVPB      vancomycin  IVPB 1000 milliGRAM(s) IV Intermittent every 12 hours    OTHER MEDICATIONS:  Endocrine/Metabolic Medications:  insulin lispro (HumaLOG) corrective regimen sliding scale   SubCutaneous every 4 hours    Topical/Other Medications:  artificial  tears Solution 1 Drop(s) Both EYES every 12 hours  chlorhexidine 0.12% Liquid 15 milliLiter(s) Oral Mucosa two times a day  chlorhexidine 4% Liquid 1 Application(s) Topical <User Schedule>      ASSESSMENT/PLAN:   54y Female PMHx HTN, T2DM, asthma, depression with recent diagnosis of invasive ampullary adenocarcinoma of the CBD whom underwent an uneventful whipple 18. Patient has had an uptrending WBC since procedure, initially attributed to not restarting antibiotics for cholangitis, but experienced abdominal tenderness/distention and tachycardia yesterday and was transferred to SICU for close hemodynamic monitoring. Now s/p Celiotomy, evacuation of hematoma, cessation of gastroduodenal artery bleeding, and placement of Abthera vac . now s/p RTOR for abdominal washout     PLAN:  -continue care per SICU  -TPN  -Added 10 units of Insulin to TPN           1. Protein calorie malnutrition being optimized with TPN: CHO [ ] gm.  AA [ ] gm. Lipids [ ] gm.  2.  Hyperglycemia managed with: [ ] units of regular insulin    3.  Check fluid balance daily.  Strict I/O  [ ] [ ]   4.  Daily BMP, Ionized Calcium, Magnesium and Phosphorous   5.  Triglycerides at initiation of TPN and monthly [ ] [ ]   6.  Pepcid in TPN for Gi prophylaxis  [ ]

## 2019-01-26 NOTE — PROGRESS NOTE ADULT - SUBJECTIVE AND OBJECTIVE BOX
General Surgery Progress Note    SUBJECTIVE:  The patient was seen and examined. No acute events overnight. Off pressors.    OBJECTIVE:     ** VITAL SIGNS / I&O's **    Vital Signs Last 24 Hrs  T(C): 38 (26 Jan 2019 08:00), Max: 38.9 (26 Jan 2019 00:00)  T(F): 100.4 (26 Jan 2019 08:00), Max: 102 (26 Jan 2019 00:00)  HR: 82 (26 Jan 2019 08:39) (62 - 93)  BP: 138/71 (26 Jan 2019 08:33) (138/71 - 138/71)  BP(mean): 88 (26 Jan 2019 08:33) (88 - 88)  RR: 19 (26 Jan 2019 08:33) (8 - 24)  SpO2: 99% (26 Jan 2019 08:39) (93% - 100%)  Mode: CPAP with PS  FiO2: 50  PEEP: 10  PS: 13  MAP: 12.4      25 Jan 2019 07:01  -  26 Jan 2019 07:00  --------------------------------------------------------  IN:    Albumin 25%: 50 mL    bumetanide Infusion: 35 mL    dexmedetomidine Infusion: 147.8 mL    fat emulsion (Fish Oil and Plant Based) 20% Infusion: 180 mL    fentaNYL Infusion.: 204 mL    IV PiggyBack: 1600 mL    norepinephrine Infusion: 39 mL    TPN (Total Parenteral Nutrition): 1664 mL  Total IN: 3919.8 mL    OUT:    Bulb: 255 mL    Bulb: 75 mL    Indwelling Catheter - Urethral: 7330 mL    Nasoenteral Tube: 200 mL  Total OUT: 7860 mL    Total NET: -3940.2 mL      26 Jan 2019 07:01  -  26 Jan 2019 08:58  --------------------------------------------------------  IN:    dexmedetomidine Infusion: 14.4 mL    fentaNYL Infusion.: 17.2 mL    TPN (Total Parenteral Nutrition): 166 mL  Total IN: 197.6 mL    OUT:    Bulb: 70 mL    Bulb: 50 mL    Indwelling Catheter - Urethral: 350 mL  Total OUT: 470 mL    Total NET: -272.4 mL          ** PHYSICAL EXAM **    -- CONSTITUTIONAL: Alert, NAD  -- PULMONARY: non-labored respirations  -- ABDOMEN: soft, ND, abdominal binder in place, RLQ JOURDAN with dark/bilious output, LUQ and LLQ JOURDAN SS, no rebound or guarding    ** LABS **                          9.2    15.04 )-----------( 201      ( 26 Jan 2019 02:30 )             28.6     26 Jan 2019 02:30    131    |  92     |  11     ----------------------------<  167    3.6     |  27     |  0.65     Ca    7.7        26 Jan 2019 02:30  Phos  2.5       26 Jan 2019 02:30  Mg     2.1       26 Jan 2019 02:30    TPro  5.6    /  Alb  2.1    /  TBili  4.0    /  DBili  3.0    /  AST  100    /  ALT  29     /  AlkPhos  254    26 Jan 2019 02:30    PT/INR - ( 25 Jan 2019 00:20 )   PT: 15.8 SEC;   INR: 1.41          PTT - ( 25 Jan 2019 00:20 )  PTT:34.3 SEC  CAPILLARY BLOOD GLUCOSE      POCT Blood Glucose.: 195 mg/dL (26 Jan 2019 05:06)  POCT Blood Glucose.: 205 mg/dL (25 Jan 2019 23:46)  POCT Blood Glucose.: 204 mg/dL (25 Jan 2019 17:27)  POCT Blood Glucose.: 164 mg/dL (25 Jan 2019 12:09)        LIVER FUNCTIONS - ( 26 Jan 2019 02:30 )  Alb: 2.1 g/dL / Pro: 5.6 g/dL / ALK PHOS: 254 u/L / ALT: 29 u/L / AST: 100 u/L / GGT: x                 MEDICATIONS  (STANDING):  artificial  tears Solution 1 Drop(s) Both EYES every 12 hours  chlorhexidine 0.12% Liquid 15 milliLiter(s) Oral Mucosa two times a day  chlorhexidine 4% Liquid 1 Application(s) Topical <User Schedule>  dexmedetomidine Infusion 0.5 MICROgram(s)/kG/Hr (7.175 mL/Hr) IV Continuous <Continuous>  dextrose 5%. 1000 milliLiter(s) (50 mL/Hr) IV Continuous <Continuous>  enoxaparin Injectable 40 milliGRAM(s) SubCutaneous daily  fat emulsion (Fish Oil and Plant Based) 20% Infusion 12.5 mL/Hr (12.5 mL/Hr) IV Continuous <Continuous>  fat emulsion (Fish Oil and Plant Based) 20% Infusion 6.25 mL/Hr (6.25 mL/Hr) IV Continuous <Continuous>  fentaNYL   Infusion. 0.5 MICROgram(s)/kG/Hr (2.87 mL/Hr) IV Continuous <Continuous>  insulin lispro (HumaLOG) corrective regimen sliding scale   SubCutaneous every 4 hours  meropenem  IVPB 1000 milliGRAM(s) IV Intermittent every 8 hours  meropenem  IVPB      micafungin IVPB      micafungin IVPB 100 milliGRAM(s) IV Intermittent every 24 hours  norepinephrine Infusion 0.05 MICROgram(s)/kG/Min (2.691 mL/Hr) IV Continuous <Continuous>  pantoprazole  Injectable 40 milliGRAM(s) IV Push daily  Parenteral Nutrition - Adult 1 Each (83 mL/Hr) TPN Continuous <Continuous>  vancomycin  IVPB      vancomycin  IVPB 1000 milliGRAM(s) IV Intermittent every 12 hours    MEDICATIONS  (PRN):

## 2019-01-26 NOTE — PROGRESS NOTE ADULT - ASSESSMENT
54y Female PMHx HTN, T2DM, asthma, depression with recent diagnosis of invasive ampullary adenocarcinoma of the CBD whom underwent an uneventful whipple 1/11/18. Patient has had an uptrending WBC since procedure, initially attributed to not restarting antibiotics for cholangitis, but experienced abdominal tenderness/distention and tachycardia yesterday and was transferred to SICU for close hemodynamic monitoring. Now s/p Celiotomy, evacuation of hematoma, cessation of gastroduodenal artery bleeding, and placement of Abthera vac 1/18. now s/p RTOR for abdominal washout 1/21    PLAN:  -continue care per SICU  -Pain control as needed  -Monitor vs, uop  -Monitor abd incision and closure device  -Serial CBCs, H/H  -cont meropenem, vanc, micafungin  -npo/ivf  -TPN  -dvt ppx: 40mg lovenox subq

## 2019-01-26 NOTE — PROGRESS NOTE ADULT - ATTENDING COMMENTS
54y Female PMHx HTN, T2DM, asthma, depression with recent diagnosis of invasive ampullary adenocarcinoma of the CBD admitted for cholangitis on 1/5, s/p ERCP 1/8, whipple 1/11, rapid response for GIB on floor 1/18 s/p MTP, RTOR s/p celiotomy, evacuation of hematoma, GDA bleed stopped and sutured, Abthera vac.   Closure of abdomen with strattice mesh on 1/21, with inna drains and left lower JOURDAN drain in subcutaneous   Incisional bleed 1/23, bedside skin staples removed, JOURDAN drain removed, hemorrhage controlled, for continuation of ICU care.  Neuro:  - fent and precedex tbc, sedation vacation daily to assess neurostatus  - intermittent requirement for levo, will continue PRN    Resp:  - ct ABG monitoring as per routine  - adjust vent settings prn depending upon pt's response   - CPAP trial as tolerated if patient remains stable then consider extubation  - daily CXR and ABG in AM      CV:  - vitals q1h, ct flotrac and A line monitoring, intermittent vasopressor     GI:  - ct TPN through dedicated port  - Continue protonix till extubated   - daily electrolyte with ionized Ca as on TPN    Renal:  - Strict I&O, monitor lytes, replete as needed  - IVF on hold for now   - aggressive diuresis yesterday with net neg 3L urine with bumex gtt, no diuresis today, tomorrow may plan for diamox     Endo:  - diabetic, FS q6h and low dose corrective regimen for hyperglycemia, needs better control   - TPN with insulin 10 U from today, ct to monitor     Heme:   -monitor H/H, stable   -ct lovenox vte ppx    ID:   - blood cultures 1/5 grew E coli, repeat cultures have been negative except blood cx 1/18 grew coag negative staph in one bottle (possible contamination), repeat negative  - ct Meropenem/vancomycin for broadened coverage for now as pt continuously febrile and WBC elevated,   - UA positive, urine cx 1/21 candida, micafungin since 1/23, stop on 2/1  -no intra op fluid cultures sent  - intubated since 1/18  - Swanson to be continued for UOP monitoring in critically ill pt   - abdominal incision with external tissue expander device in place 1/24    Access: LIJ central line, 2 PIV, R axillary arterial line    Dispo: SICU    SICU Dx: Severe calorie protein Malnutrition, invasive ampullary carcinoma s/p whipple, acute blood loss anemia, Fluid overload, respiratory failure.  The patient is a critical care patient with life threatening hemodynamic and metabolic instability in SICU.  I have personally interviewed when possible and examined the patient, reviewed data and laboratory tests/x-rays and all pertinent electronic images.  I was physically present for the key portions of the evaluation and management (E/M) service provided.   The SICU team has a constant risk benefit analyzes discussion with the primary team, all consultants, House Staff and PA's on all decisions.  These diagnoses are unrelated to the surgical procedure noted above.  I meet with family if needed to get further history, discuss the case and make care decisions for this patient who might not be able to participate.  Time involved in performance of separately billable procedures was not counted toward my critical care time. There is no overlap.  I spent 55-75 minutes of critical care time for the diagnoses, assessment, plan and interventions.

## 2019-01-27 LAB
ANION GAP SERPL CALC-SCNC: 8 MMO/L — SIGNIFICANT CHANGE UP (ref 7–14)
BASE EXCESS BLDA CALC-SCNC: 4.8 MMOL/L — SIGNIFICANT CHANGE UP
BASE EXCESS BLDA CALC-SCNC: 6.8 MMOL/L — SIGNIFICANT CHANGE UP
BUN SERPL-MCNC: 11 MG/DL — SIGNIFICANT CHANGE UP (ref 7–23)
CA-I BLD-SCNC: 1 MMOL/L — LOW (ref 1.03–1.23)
CA-I BLDA-SCNC: 1.13 MMOL/L — LOW (ref 1.15–1.29)
CALCIUM SERPL-MCNC: 7.5 MG/DL — LOW (ref 8.4–10.5)
CHLORIDE SERPL-SCNC: 94 MMOL/L — LOW (ref 98–107)
CO2 SERPL-SCNC: 28 MMOL/L — SIGNIFICANT CHANGE UP (ref 22–31)
CREAT SERPL-MCNC: 0.58 MG/DL — SIGNIFICANT CHANGE UP (ref 0.5–1.3)
GLUCOSE BLDA-MCNC: 192 MG/DL — HIGH (ref 70–99)
GLUCOSE SERPL-MCNC: 194 MG/DL — HIGH (ref 70–99)
HCO3 BLDA-SCNC: 29 MMOL/L — HIGH (ref 22–26)
HCO3 BLDA-SCNC: 31 MMOL/L — HIGH (ref 22–26)
HCT VFR BLD CALC: 26.1 % — LOW (ref 34.5–45)
HCT VFR BLDA CALC: 26.3 % — LOW (ref 34.5–46.5)
HGB BLD-MCNC: 8.5 G/DL — LOW (ref 11.5–15.5)
HGB BLDA-MCNC: 8.5 G/DL — LOW (ref 11.5–15.5)
LACTATE BLDA-SCNC: 1.9 MMOL/L — SIGNIFICANT CHANGE UP (ref 0.5–2)
MAGNESIUM SERPL-MCNC: 1.8 MG/DL — SIGNIFICANT CHANGE UP (ref 1.6–2.6)
MCHC RBC-ENTMCNC: 28.2 PG — SIGNIFICANT CHANGE UP (ref 27–34)
MCHC RBC-ENTMCNC: 32.6 % — SIGNIFICANT CHANGE UP (ref 32–36)
MCV RBC AUTO: 86.7 FL — SIGNIFICANT CHANGE UP (ref 80–100)
NRBC # FLD: 0 K/UL — LOW (ref 25–125)
PCO2 BLDA: 37 MMHG — SIGNIFICANT CHANGE UP (ref 32–48)
PCO2 BLDA: 43 MMHG — SIGNIFICANT CHANGE UP (ref 32–48)
PH BLDA: 7.45 PH — SIGNIFICANT CHANGE UP (ref 7.35–7.45)
PH BLDA: 7.52 PH — HIGH (ref 7.35–7.45)
PHOSPHATE SERPL-MCNC: 2.6 MG/DL — SIGNIFICANT CHANGE UP (ref 2.5–4.5)
PLATELET # BLD AUTO: 194 K/UL — SIGNIFICANT CHANGE UP (ref 150–400)
PMV BLD: 11.1 FL — SIGNIFICANT CHANGE UP (ref 7–13)
PO2 BLDA: 198 MMHG — HIGH (ref 83–108)
PO2 BLDA: 78 MMHG — LOW (ref 83–108)
POTASSIUM BLDA-SCNC: 3.5 MMOL/L — SIGNIFICANT CHANGE UP (ref 3.4–4.5)
POTASSIUM SERPL-MCNC: 3.7 MMOL/L — SIGNIFICANT CHANGE UP (ref 3.5–5.3)
POTASSIUM SERPL-SCNC: 3.7 MMOL/L — SIGNIFICANT CHANGE UP (ref 3.5–5.3)
RBC # BLD: 3.01 M/UL — LOW (ref 3.8–5.2)
RBC # FLD: 17.8 % — HIGH (ref 10.3–14.5)
SAO2 % BLDA: 96.4 % — SIGNIFICANT CHANGE UP (ref 95–99)
SAO2 % BLDA: 99.7 % — HIGH (ref 95–99)
SODIUM BLDA-SCNC: 128 MMOL/L — LOW (ref 136–146)
SODIUM SERPL-SCNC: 130 MMOL/L — LOW (ref 135–145)
VANCOMYCIN TROUGH SERPL-MCNC: 21.9 UG/ML — HIGH (ref 10–20)
WBC # BLD: 12.16 K/UL — HIGH (ref 3.8–10.5)
WBC # FLD AUTO: 12.16 K/UL — HIGH (ref 3.8–10.5)

## 2019-01-27 PROCEDURE — 99291 CRITICAL CARE FIRST HOUR: CPT

## 2019-01-27 PROCEDURE — 71045 X-RAY EXAM CHEST 1 VIEW: CPT | Mod: 26

## 2019-01-27 RX ORDER — ACETAMINOPHEN 500 MG
1000 TABLET ORAL ONCE
Qty: 0 | Refills: 0 | Status: COMPLETED | OUTPATIENT
Start: 2019-01-27 | End: 2019-01-27

## 2019-01-27 RX ORDER — VANCOMYCIN HCL 1 G
750 VIAL (EA) INTRAVENOUS EVERY 12 HOURS
Qty: 0 | Refills: 0 | Status: DISCONTINUED | OUTPATIENT
Start: 2019-01-27 | End: 2019-01-28

## 2019-01-27 RX ORDER — ACETAZOLAMIDE 250 MG/1
250 TABLET ORAL EVERY 8 HOURS
Qty: 0 | Refills: 0 | Status: COMPLETED | OUTPATIENT
Start: 2019-01-27 | End: 2019-01-28

## 2019-01-27 RX ORDER — ELECTROLYTE SOLUTION,INJ
1 VIAL (ML) INTRAVENOUS
Qty: 0 | Refills: 0 | Status: DISCONTINUED | OUTPATIENT
Start: 2019-01-27 | End: 2019-01-27

## 2019-01-27 RX ORDER — NOREPINEPHRINE BITARTRATE/D5W 8 MG/250ML
0.05 PLASTIC BAG, INJECTION (ML) INTRAVENOUS
Qty: 16 | Refills: 0 | Status: DISCONTINUED | OUTPATIENT
Start: 2019-01-27 | End: 2019-01-27

## 2019-01-27 RX ORDER — I.V. FAT EMULSION 20 G/100ML
12.5 EMULSION INTRAVENOUS
Qty: 30 | Refills: 0 | Status: DISCONTINUED | OUTPATIENT
Start: 2019-01-27 | End: 2019-01-29

## 2019-01-27 RX ORDER — PHENYLEPHRINE HYDROCHLORIDE 10 MG/ML
0.1 INJECTION INTRAVENOUS
Qty: 160 | Refills: 0 | Status: DISCONTINUED | OUTPATIENT
Start: 2019-01-27 | End: 2019-01-27

## 2019-01-27 RX ADMIN — Medication 1 DROP(S): at 05:30

## 2019-01-27 RX ADMIN — I.V. FAT EMULSION 12.5 ML/HR: 20 EMULSION INTRAVENOUS at 16:00

## 2019-01-27 RX ADMIN — MICAFUNGIN SODIUM 105 MILLIGRAM(S): 100 INJECTION, POWDER, LYOPHILIZED, FOR SOLUTION INTRAVENOUS at 21:47

## 2019-01-27 RX ADMIN — Medication 2: at 10:17

## 2019-01-27 RX ADMIN — CHLORHEXIDINE GLUCONATE 15 MILLILITER(S): 213 SOLUTION TOPICAL at 17:20

## 2019-01-27 RX ADMIN — Medication 1 APPLICATION(S): at 11:10

## 2019-01-27 RX ADMIN — Medication 2: at 17:17

## 2019-01-27 RX ADMIN — Medication 2: at 05:29

## 2019-01-27 RX ADMIN — ACETAZOLAMIDE 250 MILLIGRAM(S): 250 TABLET ORAL at 10:06

## 2019-01-27 RX ADMIN — MEROPENEM 100 MILLIGRAM(S): 1 INJECTION INTRAVENOUS at 14:19

## 2019-01-27 RX ADMIN — Medication 4: at 03:00

## 2019-01-27 RX ADMIN — MEROPENEM 100 MILLIGRAM(S): 1 INJECTION INTRAVENOUS at 22:31

## 2019-01-27 RX ADMIN — MEROPENEM 100 MILLIGRAM(S): 1 INJECTION INTRAVENOUS at 05:30

## 2019-01-27 RX ADMIN — ENOXAPARIN SODIUM 40 MILLIGRAM(S): 100 INJECTION SUBCUTANEOUS at 11:10

## 2019-01-27 RX ADMIN — Medication 400 MILLIGRAM(S): at 20:45

## 2019-01-27 RX ADMIN — CHLORHEXIDINE GLUCONATE 15 MILLILITER(S): 213 SOLUTION TOPICAL at 05:30

## 2019-01-27 RX ADMIN — Medication 250 MILLIGRAM(S): at 06:30

## 2019-01-27 RX ADMIN — Medication 250 MILLIGRAM(S): at 17:09

## 2019-01-27 RX ADMIN — ACETAZOLAMIDE 250 MILLIGRAM(S): 250 TABLET ORAL at 17:21

## 2019-01-27 RX ADMIN — CHLORHEXIDINE GLUCONATE 1 APPLICATION(S): 213 SOLUTION TOPICAL at 22:31

## 2019-01-27 RX ADMIN — DEXMEDETOMIDINE HYDROCHLORIDE IN 0.9% SODIUM CHLORIDE 7.17 MICROGRAM(S)/KG/HR: 4 INJECTION INTRAVENOUS at 10:09

## 2019-01-27 RX ADMIN — Medication 1 DROP(S): at 17:16

## 2019-01-27 RX ADMIN — Medication 2: at 22:31

## 2019-01-27 RX ADMIN — Medication 2: at 14:19

## 2019-01-27 RX ADMIN — Medication 1 EACH: at 17:01

## 2019-01-27 RX ADMIN — Medication 400 MILLIGRAM(S): at 11:10

## 2019-01-27 RX ADMIN — Medication 1000 MILLIGRAM(S): at 21:15

## 2019-01-27 NOTE — PROGRESS NOTE ADULT - SUBJECTIVE AND OBJECTIVE BOX
Surgery Progress Note    S: Patient seen and examined. No acute events overnight. Patient continues to be diuresed to attempt extubation when possible.     O:  Vital Signs Last 24 Hrs  T(C): 38.3 (27 Jan 2019 08:00), Max: 39.1 (26 Jan 2019 16:00)  T(F): 101 (27 Jan 2019 08:00), Max: 102.4 (26 Jan 2019 16:00)  HR: 83 (27 Jan 2019 08:00) (65 - 94)  BP: --  BP(mean): --  RR: 13 (27 Jan 2019 08:00) (8 - 20)  SpO2: 100% (27 Jan 2019 08:00) (99% - 100%)    I&O's Detail    26 Jan 2019 07:01  -  27 Jan 2019 07:00  --------------------------------------------------------  IN:    dexmedetomidine Infusion: 165.6 mL    fat emulsion (Fish Oil and Plant Based) 20% Infusion: 150 mL    fentaNYL Infusion.: 197.8 mL    IV PiggyBack: 850 mL    norepinephrine Infusion: 2.2 mL    TPN (Total Parenteral Nutrition): 1909 mL  Total IN: 3274.6 mL    OUT:    Bulb: 345 mL    Bulb: 230 mL    Indwelling Catheter - Urethral: 1615 mL    Nasoenteral Tube: 150 mL  Total OUT: 2340 mL    Total NET: 934.6 mL          MEDICATIONS  (STANDING):  artificial  tears Solution 1 Drop(s) Both EYES every 12 hours  chlorhexidine 0.12% Liquid 15 milliLiter(s) Oral Mucosa two times a day  chlorhexidine 4% Liquid 1 Application(s) Topical <User Schedule>  dexmedetomidine Infusion 0.5 MICROgram(s)/kG/Hr (7.175 mL/Hr) IV Continuous <Continuous>  dextrose 5%. 1000 milliLiter(s) (50 mL/Hr) IV Continuous <Continuous>  enoxaparin Injectable 40 milliGRAM(s) SubCutaneous daily  fat emulsion (Fish Oil and Plant Based) 20% Infusion 12.5 mL/Hr (12.5 mL/Hr) IV Continuous <Continuous>  fentaNYL   Infusion. 0.5 MICROgram(s)/kG/Hr (2.87 mL/Hr) IV Continuous <Continuous>  insulin lispro (HumaLOG) corrective regimen sliding scale   SubCutaneous every 4 hours  meropenem  IVPB 1000 milliGRAM(s) IV Intermittent every 8 hours  meropenem  IVPB      micafungin IVPB      micafungin IVPB 100 milliGRAM(s) IV Intermittent every 24 hours  norepinephrine Infusion 0.05 MICROgram(s)/kG/Min (2.691 mL/Hr) IV Continuous <Continuous>  pantoprazole  Injectable 40 milliGRAM(s) IV Push daily  Parenteral Nutrition - Adult 1 Each (83 mL/Hr) TPN Continuous <Continuous>  petrolatum Ophthalmic Ointment 1 Application(s) Both EYES daily  vancomycin  IVPB 750 milliGRAM(s) IV Intermittent every 12 hours    MEDICATIONS  (PRN):                            8.5    12.16 )-----------( 194      ( 27 Jan 2019 01:00 )             26.1       01-27    130<L>  |  94<L>  |  11  ----------------------------<  194<H>  3.7   |  28  |  0.58    Ca    7.5<L>      27 Jan 2019 01:00  Phos  2.6     01-27  Mg     1.8     01-27    TPro  5.6<L>  /  Alb  2.1<L>  /  TBili  4.0<H>  /  DBili  3.0<H>  /  AST  100<H>  /  ALT  29  /  AlkPhos  254<H>  01-26      Physical Exam:  Gen: Laying in bed, NAD, NGT in place with dark bilious output, intubated  Resp: Unlabored breathing via vent  Abd: soft, ND, midline incision re-packed and covered, RLQ JOURDAN with bilious output, LLQ JOURDAN with dark/serous output, no rebound or guarding  Ext: WWP  Skin: No rashes  : sinha in place with yellow output

## 2019-01-27 NOTE — PROGRESS NOTE ADULT - ATTENDING COMMENTS
54y Female PMHx HTN, T2DM, asthma, depression with recent diagnosis of invasive ampullary adenocarcinoma of the CBD admitted for cholangitis on 1/5, s/p ERCP 1/8, whipple 1/11, rapid response for GIB on floor 1/18 s/p MTP, RTOR s/p celiotomy, evacuation of hematoma, GDA bleed stopped and sutured, Abthera vac.   Closure of abdomen with strattice mesh on 1/21, with inna drains and left lower JOURDAN drain in subcutaneous   Incisional bleed 1/23, bedside skin staples removed, JOURDAN drain removed, hemorrhage controlled, for continuation of ICU care.     Neuro:  - intermittent requirement for levo, will continue PRN  -Can d/c Fentanyl  -Decrease precedex to prepare for intubation    Resp:  - ct ABG monitoring as per routine  - adjust vent settings prn depending upon pt's response   - CPAP trial as tolerated if patient remains stable then consider extubation  - daily CXR and ABG (alkalotic this am)  - On CPAP Now (5/5/20%) --> repeat ABG & Bicarb   -Likely extubate later today after diuresis     CV:  - vitals q1h, ct flotrac and A line monitoring, intermittent vasopressor     GI:  - ct TPN through dedicated port  - d/c protonix will start pepcid  - daily electrolyte with ionized Ca as on TPN    Renal:  - Strict I&O, monitor lytes, replete as needed  - IVF on hold for now   - aggressive diuresis yesterday with net neg 3L urine with bumex gtt  -Diamox today for extubation later    Endo:  - diabetic, FS q6h and low dose corrective regimen for hyperglycemia, needs better control   - TPN with insulin 10 U from today, ct to monitor     Heme:   -monitor H/H, stable   -ct lovenox vte ppx    ID:   - blood cultures 1/5 grew E coli, repeat cultures have been negative except blood cx 1/18 grew coag negative staph in one bottle (possible contamination), repeat negative  - ct Meropenem/vancomycin for broadened coverage for now as pt continuously febrile and WBC elevated (Vanc was decreased as trough was high)  - UA positive, urine cx 1/21 candida, micafungin since 1/23, stop on 2/1  -no intra op fluid cultures sent  - intubated since 1/18  - Swanson to be continued for UOP monitoring in critically ill pt   - abdominal incision with external tissue expander device in place 1/24    Access: LIJ central line, 2 PIV, R axillary arterial line    Dispo: SICU    SICU Dx: SCP Malnutrition, invasive ampullary carcinoma s/p whipple, acute blood loss anemia, Fluid overload.  The patient is a critical care patient with life threatening hemodynamic and metabolic instability in SICU.  I have personally interviewed when possible and examined the patient, reviewed data and laboratory tests/x-rays and all pertinent electronic images.  I was physically present for the key portions of the evaluation and management (E/M) service provided.   The SICU team has a constant risk benefit analyzes discussion with the primary team, all consultants, House Staff and PA's on all decisions.  These diagnoses are unrelated to the surgical procedure noted above.  I meet with family if needed to get further history, discuss the case and make care decisions for this patient who might not be able to participate.  Time involved in performance of separately billable procedures was not counted toward my critical care time. There is no overlap.  I spent 55-75 minutes of critical care time for the diagnoses, assessment, plan and interventions.

## 2019-01-27 NOTE — PROGRESS NOTE ADULT - ASSESSMENT
54y Female PMHx HTN, T2DM, asthma, depression with recent diagnosis of invasive ampullary adenocarcinoma of the CBD admitted for cholangitis on 1/5, s/p ERCP 1/8, whipple 1/11, rapid response for GIB on floor 1/18 s/p MTP, RTOR s/p celiotomy, evacuation of hematoma, GDA bleed stopped and sutured, Abthera vac.   Closure of abdomen with strattice mesh on 1/21, with inna drains and left lower JOURDAN drain in subcutaneous   Incisional bleed 1/23, bedside skin staples removed, JOURDAN drain removed, hemorrhage controlled, for continuation of ICU care.     Neuro:  -Precedex for sedation  -Pain control PRN    Resp:  - ABG monitoring as per routine, PS trial in AM, plan to extubate  - daily CXR and ABG (alkalotic this am)     CV:  - vitals q1h, ct flotrac and A line monitoring, intermittent vasopressor requirement (Levophed)    GI:  - TPN through dedicated port  -Monitor electrolytes    Renal:  - Strict I&O, monitor lytes, replete as needed  -Diamox 250mcg q8h    Endo:  - diabetic, FS q6h and low dose corrective regimen for hyperglycemia  - TPN with insulin 10 U from today, ct to monitor     Heme:   -monitor H/H, stable   -ct lovenox vte ppx    ID:   - blood cultures 1/5 grew E coli, repeat cultures have been negative except blood cx 1/18 grew coag negative staph in one bottle (possible contamination), repeat negative  - ct Meropenem/vancomycin for broadened coverage for now as pt continuously febrile and WBC elevated (Vanc was decreased as trough was high)  - UA positive, urine cx 1/21 candida, micafungin since 1/23, stop on 2/1  - intubated since 1/18  - Swanson to be continued for UOP monitoring in critically ill pt   - abdominal incision with external tissue expander device in place 1/24    Access: LIJ central line, 2 PIV, R axillary arterial line    Dispo: SICU    SICU Dx: SCP Malnutrition, invasive ampullary carcinoma s/p whipple, acute blood loss anemia, Fluid overload.  The patient is a critical care patient with life threatening hemodynamic and metabolic instability in SICU.

## 2019-01-27 NOTE — PROGRESS NOTE ADULT - SUBJECTIVE AND OBJECTIVE BOX
HISTORY  54y Female PMHx HTN, T2DM, asthma, depression with recent diagnosis of invasive ampullary adenocarcinoma of the CBD admitted for cholangitis on 1/5, s/p ERCP 1/8, whipple 1/11, rapid response for GIB on floor 1/18 s/p MTP, RTOR s/p celiotomy, evacuation of hematoma, GDA bleed stopped and sutured, Abthera vac.   Closure of abdomen with strattice mesh on 1/21, with inna drains and left lower JOURDAN drain in subcutaneous   Incisional bleed 1/23, bedside skin staples removed, JOURDAN drain removed, hemorrhage controlled, for continuation of ICU care     24 HOUR EVENTS: No acute events overnight, patient required some levafed overnight but not for the entire night.    SUBJECTIVE/ROS:  [ ] A ten-point review of systems was otherwise negative except as noted.  [ ] Due to altered mental status/intubation, subjective information were not able to be obtained from the patient. History was obtained, to the extent possible, from review of the chart and collateral sources of information.      NEURO  RASS: -3 to -4  Exam: sedated, minimally arousable.  Awakens but agitated when off sedation  Meds: dexmedetomidine Infusion 0.5 MICROgram(s)/kG/Hr IV Continuous <Continuous>  fentaNYL   Infusion. 0.5 MICROgram(s)/kG/Hr IV Continuous <Continuous>    [x] Adequacy of sedation and pain control has been assessed and adjusted      RESPIRATORY  RR: 12 (01-27-19 @ 01:00) (8 - 19)  SpO2: 100% (01-27-19 @ 01:00) (98% - 100%)  Exam: unlabored, clear to auscultation bilaterally  Mechanical Ventilation: Mode: CPAP with PS, RR (patient): 7, FiO2: 50, PEEP: 10, PS: 13, MAP: 12  ABG - ( 26 Jan 2019 02:30 )  pH: 7.49  /  pCO2: 41    /  pO2: 134   / HCO3: 31    / Base Excess: 7.6   /  SaO2: 99.0      [N/A] Extubation Readiness Assessed      CARDIOVASCULAR  HR: 70 (01-27-19 @ 01:00) (65 - 82)  BP: 138/71 (01-26-19 @ 08:33) (138/71 - 138/71)  BP(mean): 88 (01-26-19 @ 08:33) (88 - 88)  ABP: 119/55 (01-27-19 @ 01:00) (86/43 - 130/58)  ABP(mean): 80 (01-27-19 @ 01:00) (60 - 86)      Exam: regular rate and rhythm, +3 edema  Cardiac Rhythm: sinus  Perfusion     [x]Adequate   [ ]Inadequate  Mentation   [x]Normal       [ ]Reduced  Extremities  [x]Warm         [ ]Cool  Volume Status [ ]Hypervolemic [x]Euvolemic [ ]Hypovolemic  Meds: norepinephrine Infusion 0.05 MICROgram(s)/kG/Min IV Continuous <Continuous>        GI/NUTRITION   Mildly distended, midline incision open with wet to dry dressing, JPx2  Diet: NPO, on TPN  Meds: pantoprazole  Injectable 40 milliGRAM(s) IV Push daily      GENITOURINARY  I&O's Detail    01-25 @ 07:01  -  01-26 @ 07:00  --------------------------------------------------------  IN:    Albumin 25%: 50 mL    bumetanide Infusion: 35 mL    dexmedetomidine Infusion: 147.8 mL    fat emulsion (Fish Oil and Plant Based) 20% Infusion: 180 mL    fentaNYL Infusion.: 204 mL    IV PiggyBack: 1600 mL    norepinephrine Infusion: 39 mL    TPN (Total Parenteral Nutrition): 1664 mL  Total IN: 3919.8 mL    OUT:    Bulb: 255 mL    Bulb: 75 mL    Indwelling Catheter - Urethral: 7330 mL    Nasoenteral Tube: 200 mL  Total OUT: 7860 mL    Total NET: -3940.2 mL      01-26 @ 07:01  -  01-27 @ 01:16  --------------------------------------------------------  IN:    dexmedetomidine Infusion: 129.6 mL    fat emulsion (Fish Oil and Plant Based) 20% Infusion: 112.5 mL    fentaNYL Infusion.: 154.8 mL    IV PiggyBack: 550 mL    norepinephrine Infusion: 2.2 mL    TPN (Total Parenteral Nutrition): 1494 mL  Total IN: 2443.1 mL    OUT:    Bulb: 200 mL    Bulb: 275 mL    Indwelling Catheter - Urethral: 1400 mL    Nasoenteral Tube: 100 mL  Total OUT: 1975 mL    Total NET: 468.1 mL          01-26    131<L>  |  92<L>  |  11  ----------------------------<  167<H>  3.6   |  27  |  0.65    Ca    7.7<L>      26 Jan 2019 02:30  Phos  2.5     01-26  Mg     2.1     01-26    TPro  5.6<L>  /  Alb  2.1<L>  /  TBili  4.0<H>  /  DBili  3.0<H>  /  AST  100<H>  /  ALT  29  /  AlkPhos  254<H>  01-26    [ ] Swanson catheter, indication: N/A  Meds: dextrose 5%. 1000 milliLiter(s) IV Continuous <Continuous>  fat emulsion (Fish Oil and Plant Based) 20% Infusion 12.5 mL/Hr IV Continuous <Continuous>  fat emulsion (Fish Oil and Plant Based) 20% Infusion 6.25 mL/Hr IV Continuous <Continuous>  fat emulsion (Fish Oil and Plant Based) 20% Infusion 12.5 mL/Hr IV Continuous <Continuous>  Parenteral Nutrition - Adult 1 Each TPN Continuous <Continuous>        HEMATOLOGIC  Meds: enoxaparin Injectable 40 milliGRAM(s) SubCutaneous daily    [x] VTE Prophylaxis                        9.2    15.04 )-----------( 201      ( 26 Jan 2019 02:30 )             28.6       Transfusion     [ ] PRBC   [ ] Platelets   [ ] FFP   [ ] Cryoprecipitate      INFECTIOUS DISEASES  WBC Count: 15.04 K/uL (01-26 @ 02:30)    RECENT CULTURES:    Meds: meropenem  IVPB 1000 milliGRAM(s) IV Intermittent every 8 hours  meropenem  IVPB      micafungin IVPB      micafungin IVPB 100 milliGRAM(s) IV Intermittent every 24 hours  vancomycin  IVPB      vancomycin  IVPB 1000 milliGRAM(s) IV Intermittent every 12 hours        ENDOCRINE  CAPILLARY BLOOD GLUCOSE      POCT Blood Glucose.: 183 mg/dL (26 Jan 2019 22:12)  POCT Blood Glucose.: 217 mg/dL (26 Jan 2019 17:51)  POCT Blood Glucose.: 180 mg/dL (26 Jan 2019 13:50)  POCT Blood Glucose.: 196 mg/dL (26 Jan 2019 10:20)  POCT Blood Glucose.: 195 mg/dL (26 Jan 2019 05:06)    Meds: insulin lispro (HumaLOG) corrective regimen sliding scale   SubCutaneous every 4 hours        ACCESS DEVICES:  [ ] Peripheral IV  [x] Central Venous Line	[ ] R	[x] L	[x] IJ	[ ] Fem	[ ] SC	Placed:   [x] Arterial Line		[x] R	[ ] L	[ ] Fem	[ ] Rad	[ ] Ax	Placed:   [ ] PICC:					[ ] Mediport  [ ] Urinary Catheter, Date Placed:   [x] Necessity of urinary, arterial, and venous catheters discussed    OTHER MEDICATIONS:  artificial  tears Solution 1 Drop(s) Both EYES every 12 hours  chlorhexidine 0.12% Liquid 15 milliLiter(s) Oral Mucosa two times a day  chlorhexidine 4% Liquid 1 Application(s) Topical <User Schedule>  petrolatum Ophthalmic Ointment 1 Application(s) Both EYES daily      CODE STATUS:      IMAGING: HISTORY  54y Female PMHx HTN, T2DM, asthma, depression with recent diagnosis of invasive ampullary adenocarcinoma of the CBD admitted for cholangitis on 1/5, s/p ERCP 1/8, whipple 1/11, rapid response for GIB on floor 1/18 s/p MTP, RTOR s/p celiotomy, evacuation of hematoma, GDA bleed stopped and sutured, Abthera vac.   Closure of abdomen with strattice mesh on 1/21, with inna drains and left lower JOURDAN drain in subcutaneous   Incisional bleed 1/23, bedside skin staples removed, JOURDAN drain removed, hemorrhage controlled, for continuation of ICU care     24 HOUR EVENTS: No acute events overnight, patient required some levophed overnight but not for the entire night.    SUBJECTIVE/ROS:  [ ] A ten-point review of systems was otherwise negative except as noted.  [ ] Due to altered mental status/intubation, subjective information were not able to be obtained from the patient. History was obtained, to the extent possible, from review of the chart and collateral sources of information.      NEURO  RASS: -3 to -4  Exam: sedated, minimally arousable.  Awakens but agitated when off sedation  Meds: dexmedetomidine Infusion 0.5 MICROgram(s)/kG/Hr IV Continuous <Continuous>  fentaNYL   Infusion. 0.5 MICROgram(s)/kG/Hr IV Continuous <Continuous>    [x] Adequacy of sedation and pain control has been assessed and adjusted      RESPIRATORY  RR: 12 (01-27-19 @ 01:00) (8 - 19)  SpO2: 100% (01-27-19 @ 01:00) (98% - 100%)  Exam: unlabored, clear to auscultation bilaterally  Mechanical Ventilation: Mode: CPAP with PS, RR (patient): 7, FiO2: 50, PEEP: 10, PS: 13, MAP: 12  ABG - ( 26 Jan 2019 02:30 )  pH: 7.49  /  pCO2: 41    /  pO2: 134   / HCO3: 31    / Base Excess: 7.6   /  SaO2: 99.0      [N/A] Extubation Readiness Assessed      CARDIOVASCULAR  HR: 70 (01-27-19 @ 01:00) (65 - 82)  BP: 138/71 (01-26-19 @ 08:33) (138/71 - 138/71)  BP(mean): 88 (01-26-19 @ 08:33) (88 - 88)  ABP: 119/55 (01-27-19 @ 01:00) (86/43 - 130/58)  ABP(mean): 80 (01-27-19 @ 01:00) (60 - 86)      Exam: regular rate and rhythm, +3 edema  Cardiac Rhythm: sinus  Perfusion     [x]Adequate   [ ]Inadequate  Mentation   [x]Normal       [ ]Reduced  Extremities  [x]Warm         [ ]Cool  Volume Status [ ]Hypervolemic [x]Euvolemic [ ]Hypovolemic  Meds: norepinephrine Infusion 0.05 MICROgram(s)/kG/Min IV Continuous <Continuous>        GI/NUTRITION   Mildly distended, midline incision open with wet to dry dressing, JPx2  Diet: NPO, on TPN  Meds: pantoprazole  Injectable 40 milliGRAM(s) IV Push daily      GENITOURINARY  I&O's Detail    01-25 @ 07:01  -  01-26 @ 07:00  --------------------------------------------------------  IN:    Albumin 25%: 50 mL    bumetanide Infusion: 35 mL    dexmedetomidine Infusion: 147.8 mL    fat emulsion (Fish Oil and Plant Based) 20% Infusion: 180 mL    fentaNYL Infusion.: 204 mL    IV PiggyBack: 1600 mL    norepinephrine Infusion: 39 mL    TPN (Total Parenteral Nutrition): 1664 mL  Total IN: 3919.8 mL    OUT:    Bulb: 255 mL    Bulb: 75 mL    Indwelling Catheter - Urethral: 7330 mL    Nasoenteral Tube: 200 mL  Total OUT: 7860 mL    Total NET: -3940.2 mL      01-26 @ 07:01  -  01-27 @ 01:16  --------------------------------------------------------  IN:    dexmedetomidine Infusion: 129.6 mL    fat emulsion (Fish Oil and Plant Based) 20% Infusion: 112.5 mL    fentaNYL Infusion.: 154.8 mL    IV PiggyBack: 550 mL    norepinephrine Infusion: 2.2 mL    TPN (Total Parenteral Nutrition): 1494 mL  Total IN: 2443.1 mL    OUT:    Bulb: 200 mL    Bulb: 275 mL    Indwelling Catheter - Urethral: 1400 mL    Nasoenteral Tube: 100 mL  Total OUT: 1975 mL    Total NET: 468.1 mL          01-26    131<L>  |  92<L>  |  11  ----------------------------<  167<H>  3.6   |  27  |  0.65    Ca    7.7<L>      26 Jan 2019 02:30  Phos  2.5     01-26  Mg     2.1     01-26    TPro  5.6<L>  /  Alb  2.1<L>  /  TBili  4.0<H>  /  DBili  3.0<H>  /  AST  100<H>  /  ALT  29  /  AlkPhos  254<H>  01-26    [ ] Swanson catheter, indication: N/A  Meds: dextrose 5%. 1000 milliLiter(s) IV Continuous <Continuous>  fat emulsion (Fish Oil and Plant Based) 20% Infusion 12.5 mL/Hr IV Continuous <Continuous>  fat emulsion (Fish Oil and Plant Based) 20% Infusion 6.25 mL/Hr IV Continuous <Continuous>  fat emulsion (Fish Oil and Plant Based) 20% Infusion 12.5 mL/Hr IV Continuous <Continuous>  Parenteral Nutrition - Adult 1 Each TPN Continuous <Continuous>        HEMATOLOGIC  Meds: enoxaparin Injectable 40 milliGRAM(s) SubCutaneous daily    [x] VTE Prophylaxis                        9.2    15.04 )-----------( 201      ( 26 Jan 2019 02:30 )             28.6       Transfusion     [ ] PRBC   [ ] Platelets   [ ] FFP   [ ] Cryoprecipitate      INFECTIOUS DISEASES  WBC Count: 15.04 K/uL (01-26 @ 02:30)    RECENT CULTURES:    Meds: meropenem  IVPB 1000 milliGRAM(s) IV Intermittent every 8 hours  meropenem  IVPB      micafungin IVPB      micafungin IVPB 100 milliGRAM(s) IV Intermittent every 24 hours  vancomycin  IVPB      vancomycin  IVPB 1000 milliGRAM(s) IV Intermittent every 12 hours        ENDOCRINE  CAPILLARY BLOOD GLUCOSE      POCT Blood Glucose.: 183 mg/dL (26 Jan 2019 22:12)  POCT Blood Glucose.: 217 mg/dL (26 Jan 2019 17:51)  POCT Blood Glucose.: 180 mg/dL (26 Jan 2019 13:50)  POCT Blood Glucose.: 196 mg/dL (26 Jan 2019 10:20)  POCT Blood Glucose.: 195 mg/dL (26 Jan 2019 05:06)    Meds: insulin lispro (HumaLOG) corrective regimen sliding scale   SubCutaneous every 4 hours        ACCESS DEVICES:  [ ] Peripheral IV  [x] Central Venous Line	[ ] R	[x] L	[x] IJ	[ ] Fem	[ ] SC	Placed:   [x] Arterial Line		[x] R	[ ] L	[ ] Fem	[ ] Rad	[ ] Ax	Placed:   [ ] PICC:					[ ] Mediport  [ ] Urinary Catheter, Date Placed:   [x] Necessity of urinary, arterial, and venous catheters discussed    OTHER MEDICATIONS:  artificial  tears Solution 1 Drop(s) Both EYES every 12 hours  chlorhexidine 0.12% Liquid 15 milliLiter(s) Oral Mucosa two times a day  chlorhexidine 4% Liquid 1 Application(s) Topical <User Schedule>  petrolatum Ophthalmic Ointment 1 Application(s) Both EYES daily      CODE STATUS:      IMAGING: HISTORY  54y Female PMHx HTN, T2DM, asthma, depression with recent diagnosis of invasive ampullary adenocarcinoma of the CBD admitted for cholangitis on 1/5, s/p ERCP 1/8, whipple 1/11, rapid response for GIB on floor 1/18 s/p MTP, RTOR s/p celiotomy, evacuation of hematoma, GDA bleed stopped and sutured, Abthera vac.   Closure of abdomen with strattice mesh on 1/21, with inna drains and left lower JOURDAN drain in subcutaneous   Incisional bleed 1/23, bedside skin staples removed, JOURDAN drain removed, hemorrhage controlled, for continuation of ICU care     24 HOUR EVENTS: No acute events overnight, patient required some levophed overnight but not for the entire night.    SUBJECTIVE/ROS:  [ ] A ten-point review of systems was otherwise negative except as noted.  [ ] Due to altered mental status/intubation, subjective information were not able to be obtained from the patient. History was obtained, to the extent possible, from review of the chart and collateral sources of information.      NEURO  RASS: -3 to -4  Exam: sedated, minimally arousable.  Awakens but agitated when off sedation  Meds: dexmedetomidine Infusion 0.5 MICROgram(s)/kG/Hr IV Continuous <Continuous>  fentaNYL   Infusion. 0.5 MICROgram(s)/kG/Hr IV Continuous <Continuous>    [x] Adequacy of sedation and pain control has been assessed and adjusted      RESPIRATORY  RR: 12 (01-27-19 @ 01:00) (8 - 19)  SpO2: 100% (01-27-19 @ 01:00) (98% - 100%)  Exam: unlabored, clear to auscultation bilaterally  Mechanical Ventilation: Mode: CPAP with PS, RR (patient): 7, FiO2: 50, PEEP: 10, PS: 13, MAP: 12  ABG - ( 26 Jan 2019 02:30 )  pH: 7.49  /  pCO2: 41    /  pO2: 134   / HCO3: 31    / Base Excess: 7.6   /  SaO2: 99.0      [N/A] Extubation Readiness Assessed      CARDIOVASCULAR  HR: 70 (01-27-19 @ 01:00) (65 - 82)  BP: 138/71 (01-26-19 @ 08:33) (138/71 - 138/71)  BP(mean): 88 (01-26-19 @ 08:33) (88 - 88)  ABP: 119/55 (01-27-19 @ 01:00) (86/43 - 130/58)  ABP(mean): 80 (01-27-19 @ 01:00) (60 - 86)      Exam: regular rate and rhythm, +3 edema  Cardiac Rhythm: sinus  Perfusion     [x]Adequate   [ ]Inadequate  Mentation   [x]Normal       [ ]Reduced  Extremities  [x]Warm         [ ]Cool  Volume Status [ ]Hypervolemic [x]Euvolemic [ ]Hypovolemic  Meds: norepinephrine Infusion 0.05 MICROgram(s)/kG/Min IV Continuous <Continuous>        GI/NUTRITION   Mildly distended, midline incision open with wet to dry dressing, JPx2  Diet: NPO, on TPN  Meds: pantoprazole  Injectable 40 milliGRAM(s) IV Push daily      GENITOURINARY  I&O's Detail    01-25 @ 07:01  -  01-26 @ 07:00  --------------------------------------------------------  IN:    Albumin 25%: 50 mL    bumetanide Infusion: 35 mL    dexmedetomidine Infusion: 147.8 mL    fat emulsion (Fish Oil and Plant Based) 20% Infusion: 180 mL    fentaNYL Infusion.: 204 mL    IV PiggyBack: 1600 mL    norepinephrine Infusion: 39 mL    TPN (Total Parenteral Nutrition): 1664 mL  Total IN: 3919.8 mL    OUT:    Bulb: 255 mL    Bulb: 75 mL    Indwelling Catheter - Urethral: 7330 mL    Nasoenteral Tube: 200 mL  Total OUT: 7860 mL    Total NET: -3940.2 mL      01-26 @ 07:01  -  01-27 @ 01:16  --------------------------------------------------------  IN:    dexmedetomidine Infusion: 129.6 mL    fat emulsion (Fish Oil and Plant Based) 20% Infusion: 112.5 mL    fentaNYL Infusion.: 154.8 mL    IV PiggyBack: 550 mL    norepinephrine Infusion: 2.2 mL    TPN (Total Parenteral Nutrition): 1494 mL  Total IN: 2443.1 mL    OUT:    Bulb: 200 mL    Bulb: 275 mL    Indwelling Catheter - Urethral: 1400 mL    Nasoenteral Tube: 100 mL  Total OUT: 1975 mL    Total NET: 468.1 mL          01-26    131<L>  |  92<L>  |  11  ----------------------------<  167<H>  3.6   |  27  |  0.65    Ca    7.7<L>      26 Jan 2019 02:30  Phos  2.5     01-26  Mg     2.1     01-26    TPro  5.6<L>  /  Alb  2.1<L>  /  TBili  4.0<H>  /  DBili  3.0<H>  /  AST  100<H>  /  ALT  29  /  AlkPhos  254<H>  01-26    [ ] Swanson catheter, indication: N/A  Meds: dextrose 5%. 1000 milliLiter(s) IV Continuous <Continuous>  fat emulsion (Fish Oil and Plant Based) 20% Infusion 12.5 mL/Hr IV Continuous <Continuous>  fat emulsion (Fish Oil and Plant Based) 20% Infusion 6.25 mL/Hr IV Continuous <Continuous>  fat emulsion (Fish Oil and Plant Based) 20% Infusion 12.5 mL/Hr IV Continuous <Continuous>  Parenteral Nutrition - Adult 1 Each TPN Continuous <Continuous>        HEMATOLOGIC  Meds: enoxaparin Injectable 40 milliGRAM(s) SubCutaneous daily    [x] VTE Prophylaxis                        9.2    15.04 )-----------( 201      ( 26 Jan 2019 02:30 )             28.6       Transfusion     [ ] PRBC   [ ] Platelets   [ ] FFP   [ ] Cryoprecipitate      INFECTIOUS DISEASES  WBC Count: 15.04 K/uL (01-26 @ 02:30)    RECENT CULTURES:    Meds: meropenem  IVPB 1000 milliGRAM(s) IV Intermittent every 8 hours  meropenem  IVPB      micafungin IVPB      micafungin IVPB 100 milliGRAM(s) IV Intermittent every 24 hours  vancomycin  IVPB      vancomycin  IVPB 1000 milliGRAM(s) IV Intermittent every 12 hours        ENDOCRINE  CAPILLARY BLOOD GLUCOSE      POCT Blood Glucose.: 183 mg/dL (26 Jan 2019 22:12)  POCT Blood Glucose.: 217 mg/dL (26 Jan 2019 17:51)  POCT Blood Glucose.: 180 mg/dL (26 Jan 2019 13:50)  POCT Blood Glucose.: 196 mg/dL (26 Jan 2019 10:20)  POCT Blood Glucose.: 195 mg/dL (26 Jan 2019 05:06)    Meds: insulin lispro (HumaLOG) corrective regimen sliding scale   SubCutaneous every 4 hours        ACCESS DEVICES:  [ ] Peripheral IV  [x] Central Venous Line	[ ] R	[x] L	[x] IJ	[ ] Fem	[ ] SC	Placed:   [x] Arterial Line		[x] R	[ ] L	[ ] Fem	[ ] Rad	[ ] Ax	Placed:   [ ] PICC:					[ ] Mediport  [ ] Urinary Catheter, Date Placed:   [x] Necessity of urinary, arterial, and venous catheters discussed    OTHER MEDICATIONS:  artificial  tears Solution 1 Drop(s) Both EYES every 12 hours  chlorhexidine 0.12% Liquid 15 milliLiter(s) Oral Mucosa two times a day  chlorhexidine 4% Liquid 1 Application(s) Topical <User Schedule>  petrolatum Ophthalmic Ointment 1 Application(s) Both EYES daily      CODE STATUS:      IMAGING: CXR Clear

## 2019-01-27 NOTE — PROGRESS NOTE ADULT - ASSESSMENT
54y Female PMHx HTN, T2DM, asthma, depression with recent diagnosis of invasive ampullary adenocarcinoma of the CBD whom underwent an uneventful whipple 1/11/18. Patient has had an uptrending WBC since procedure, initially attributed to not restarting antibiotics for cholangitis, but experienced abdominal tenderness/distention and tachycardia yesterday and was transferred to SICU for close hemodynamic monitoring. Now s/p Celiotomy, evacuation of hematoma, cessation of gastroduodenal artery bleeding, and placement of Abthera vac 1/18. now s/p RTOR for abdominal washout 1/21    PLAN:  -Pain control as needed  -Monitor vs, uop  -Monitor abd incision and closure device  -Serial CBCs, H/H  -cont meropenem, vanc, micafungin  -npo/ivf  -TPN  -dvt ppx: 40mg lovenox subq   -appreciate SICU care    D Team Surgery  [id number]

## 2019-01-27 NOTE — PROGRESS NOTE ADULT - ASSESSMENT
54y Female PMHx HTN, T2DM, asthma, depression with recent diagnosis of invasive ampullary adenocarcinoma of the CBD admitted for cholangitis on 1/5, s/p ERCP 1/8, whipple 1/11, rapid response for GIB on floor 1/18 s/p MTP, RTOR s/p celiotomy, evacuation of hematoma, GDA bleed stopped and sutured, Abthera vac.   Closure of abdomen with strattice mesh on 1/21, with inna drains and left lower JOUDRAN drain in subcutaneous   Incisional bleed 1/23, bedside skin staples removed, JOURDAN drain removed, hemorrhage controlled, for continuation of ICU care.     Neuro:  - fent and precedex tbc, sedation vacation daily to assess neurostatus  - intermittent requirement for levo, will continue PRN    Resp:  - ct ABG monitoring as per routine  - adjust vent settings prn depending upon pt's response   - CPAP trial as tolerated if patient remains stable then consider extubation  - daily CXR and ABG in AM      CV:  - vitals q1h, ct flotrac and A line monitoring, intermittent vasopressor     GI:  - ct TPN through dedicated port  - Continue protonix till extubated   - daily electrolyte with ionized Ca as on TPN    Renal:  - Strict I&O, monitor lytes, replete as needed  - IVF on hold for now   - aggressive diuresis yesterday with net neg 3L urine with bumex gtt, no diuresis today, tomorrow may plan for diamox     Endo:  - diabetic, FS q6h and low dose corrective regimen for hyperglycemia, needs better control   - TPN with insulin 10 U from today, ct to monitor     Heme:   -monitor H/H, stable   -ct lovenox vte ppx    ID:   - blood cultures 1/5 grew E coli, repeat cultures have been negative except blood cx 1/18 grew coag negative staph in one bottle (possible contamination), repeat negative  - ct Meropenem/vancomycin for broadened coverage for now as pt continuously febrile and WBC elevated,   - UA positive, urine cx 1/21 candida, micafungin since 1/23, stop on 2/1  -no intra op fluid cultures sent  - intubated since 1/18  - Swanson to be continued for UOP monitoring in critically ill pt   - abdominal incision with external tissue expander device in place 1/24    Access: LIJ central line, 2 PIV, R axillary arterial line    Dispo: SICU    SICU Dx: Malnutrition, invasive ampullary carcinoma s/p whipple, acute blood loss anemia, Fluid overload 54y Female PMHx HTN, T2DM, asthma, depression with recent diagnosis of invasive ampullary adenocarcinoma of the CBD admitted for cholangitis on 1/5, s/p ERCP 1/8, whipple 1/11, rapid response for GIB on floor 1/18 s/p MTP, RTOR s/p celiotomy, evacuation of hematoma, GDA bleed stopped and sutured, Abthera vac.   Closure of abdomen with strattice mesh on 1/21, with inna drains and left lower JOURDAN drain in subcutaneous   Incisional bleed 1/23, bedside skin staples removed, JOURDAN drain removed, hemorrhage controlled, for continuation of ICU care.     Neuro:  - intermittent requirement for levo, will continue PRN  -Can d/c Fentanyl  -Decrease precedex to prepare for intubation    Resp:  - ct ABG monitoring as per routine  - adjust vent settings prn depending upon pt's response   - CPAP trial as tolerated if patient remains stable then consider extubation  - daily CXR and ABG (alkalotic this am)  - On CPAP Now (5/5/20%) --> repeat ABG & Bicarb   -Likely extubate later today after diuresis     CV:  - vitals q1h, ct flotrac and A line monitoring, intermittent vasopressor     GI:  - ct TPN through dedicated port  - d/c protonix will start pepcid  - daily electrolyte with ionized Ca as on TPN    Renal:  - Strict I&O, monitor lytes, replete as needed  - IVF on hold for now   - aggressive diuresis yesterday with net neg 3L urine with bumex gtt  -Diamox today for extubation later    Endo:  - diabetic, FS q6h and low dose corrective regimen for hyperglycemia, needs better control   - TPN with insulin 10 U from today, ct to monitor     Heme:   -monitor H/H, stable   -ct lovenox vte ppx    ID:   - blood cultures 1/5 grew E coli, repeat cultures have been negative except blood cx 1/18 grew coag negative staph in one bottle (possible contamination), repeat negative  - ct Meropenem/vancomycin for broadened coverage for now as pt continuously febrile and WBC elevated (Vanc was decreased as trough was high)  - UA positive, urine cx 1/21 candida, micafungin since 1/23, stop on 2/1  -no intra op fluid cultures sent  - intubated since 1/18  - Swanson to be continued for UOP monitoring in critically ill pt   - abdominal incision with external tissue expander device in place 1/24    Access: LIJ central line, 2 PIV, R axillary arterial line    Dispo: SICU    SICU Dx: Malnutrition, invasive ampullary carcinoma s/p whipple, acute blood loss anemia, Fluid overloadx

## 2019-01-27 NOTE — PROGRESS NOTE ADULT - SUBJECTIVE AND OBJECTIVE BOX
SICU Daily Progress Note  =====================================================  54y Female PMHx HTN, T2DM, asthma, depression with recent diagnosis of invasive ampullary adenocarcinoma of the CBD admitted for cholangitis on 1/5, s/p ERCP 1/8, whipple 1/11, rapid response for GIB on floor 1/18 s/p MTP, RTOR s/p celiotomy, evacuation of hematoma, GDA bleed stopped and sutured, Abthera vac. Closure of abdomen with strattice mesh on 1/21, with inna drains and left lower JOURDAN drain in subcutaneous. Incisional bleed 1/23, bedside skin staples removed, JOURDAN drain removed, hemorrhage controlled, for continuation of ICU care     Interval Events: Patient briefly attempted at PS 5/5 21% FiO2 with resultant respiratory distress, changed to 10/5 30% FiO2 with improvement. Decision to continue diuresis with Diamox 250mcg q8h and possible extubation tomorrow. Will remove A-line/Central line tomorrow 1/28. Vancomycin decreased to 750mg BID for supratherapeutic Vancomycin trough.    Allergies: No Known Allergies      MEDICATIONS:   --------------------------------------------------------------------------------------  Neurologic Medications  dexmedetomidine Infusion 0.5 MICROgram(s)/kG/Hr IV Continuous <Continuous>    Respiratory Medications    Cardiovascular Medications  acetazolamide Injectable 250 milliGRAM(s) IV Push every 8 hours    Gastrointestinal Medications  dextrose 5%. 1000 milliLiter(s) IV Continuous <Continuous>  fat emulsion (Fish Oil and Plant Based) 20% Infusion 12.5 mL/Hr IV Continuous <Continuous>  fat emulsion (Fish Oil and Plant Based) 20% Infusion 12.5 mL/Hr IV Continuous <Continuous>  Parenteral Nutrition - Adult 1 Each TPN Continuous <Continuous>  Parenteral Nutrition - Adult 1 Each TPN Continuous <Continuous>    Genitourinary Medications    Hematologic/Oncologic Medications  enoxaparin Injectable 40 milliGRAM(s) SubCutaneous daily    Antimicrobial/Immunologic Medications  meropenem  IVPB 1000 milliGRAM(s) IV Intermittent every 8 hours  meropenem  IVPB      micafungin IVPB      micafungin IVPB 100 milliGRAM(s) IV Intermittent every 24 hours  vancomycin  IVPB 750 milliGRAM(s) IV Intermittent every 12 hours    Endocrine/Metabolic Medications  insulin lispro (HumaLOG) corrective regimen sliding scale   SubCutaneous every 4 hours    Topical/Other Medications  artificial  tears Solution 1 Drop(s) Both EYES every 12 hours  chlorhexidine 0.12% Liquid 15 milliLiter(s) Oral Mucosa two times a day  chlorhexidine 4% Liquid 1 Application(s) Topical <User Schedule>  petrolatum Ophthalmic Ointment 1 Application(s) Both EYES daily    -------------------------------------------------------------------------------------    EXAM  NEUROLOGY  RASS: 1  Exam: Normal, NAD, alert, oriented      RESPIRATORY  Exam: Lungs clear to auscultation, Normal expansion/effort. Transmitted BS b/l    CARDIOVASCULAR  Exam: S1, S2.  Regular rate and rhythm    GI/NUTRITION  Exam: Abdomen soft, Non-tender, Non-distended.  Wound: c/d/i  Current Diet:  NPO    VASCULAR  Exam: Extremities warm, pink, well-perfused    SKIN  Exam: Good skin turgor, no skin breakdown    METABOLIC/FLUIDS/ELECTROLYTES  dextrose 5%. 1000 milliLiter(s) IV Continuous <Continuous>  fat emulsion (Fish Oil and Plant Based) 20% Infusion 12.5 mL/Hr IV Continuous <Continuous>  fat emulsion (Fish Oil and Plant Based) 20% Infusion 12.5 mL/Hr IV Continuous <Continuous>  Parenteral Nutrition - Adult 1 Each TPN Continuous <Continuous>  Parenteral Nutrition - Adult 1 Each TPN Continuous <Continuous>    HEMATOLOGIC  [x] VTE Prophylaxis: enoxaparin Injectable 40 milliGRAM(s) SubCutaneous daily    INFECTIOUS DISEASE  Antimicrobials/Immunologic Medications:  meropenem  IVPB 1000 milliGRAM(s) IV Intermittent every 8 hours  meropenem  IVPB      micafungin IVPB      micafungin IVPB 100 milliGRAM(s) IV Intermittent every 24 hours  vancomycin  IVPB 750 milliGRAM(s) IV Intermittent every 12 hours    Tubes/Lines/Drains   [x] Peripheral IV  [] Central Venous Line     	[] R	[] L	[] IJ	[] Fem	[] SC	Date Placed:   [] Arterial Line		[] R	[] L	[] Fem	[] Rad	[] Ax	Date Placed:   [] PICC		[] Midline		[] Mediport  [] Urinary Catheter		Date Placed:   [x] Necessity of urinary, arterial, and venous catheters discussed    LABS  --------------------------------------------------------------------------------------                        8.5    12.16 )-----------( 194      ( 27 Jan 2019 01:00 )             26.1     01-27    130<L>  |  94<L>  |  11  ----------------------------<  194<H>  3.7   |  28  |  0.58    Ca    7.5<L>      27 Jan 2019 01:00  Phos  2.6     01-27  Mg     1.8     01-27    TPro  5.6<L>  /  Alb  2.1<L>  /  TBili  4.0<H>  /  DBili  3.0<H>  /  AST  100<H>  /  ALT  29  /  AlkPhos  254<H>  01-26    CAPILLARY BLOOD GLUCOSE    POCT Blood Glucose.: 188 mg/dL (27 Jan 2019 14:11)  POCT Blood Glucose.: 178 mg/dL (27 Jan 2019 10:12)  POCT Blood Glucose.: 186 mg/dL (27 Jan 2019 05:28)  POCT Blood Glucose.: 201 mg/dL (27 Jan 2019 02:59)  POCT Blood Glucose.: 183 mg/dL (26 Jan 2019 22:12)  POCT Blood Glucose.: 217 mg/dL (26 Jan 2019 17:51)    LIVER FUNCTIONS - ( 26 Jan 2019 02:30 )  Alb: 2.1 g/dL / Pro: 5.6 g/dL / ALK PHOS: 254 u/L / ALT: 29 u/L / AST: 100 u/L / GGT: x           --------------------------------------------------------------------------------------

## 2019-01-28 LAB
ALBUMIN SERPL ELPH-MCNC: 1.9 G/DL — LOW (ref 3.3–5)
ALBUMIN SERPL ELPH-MCNC: 2.2 G/DL — LOW (ref 3.3–5)
ALP SERPL-CCNC: 456 U/L — HIGH (ref 40–120)
ALP SERPL-CCNC: 563 U/L — HIGH (ref 40–120)
ALT FLD-CCNC: 37 U/L — HIGH (ref 4–33)
ALT FLD-CCNC: 38 U/L — HIGH (ref 4–33)
ANION GAP SERPL CALC-SCNC: 10 MMO/L — SIGNIFICANT CHANGE UP (ref 7–14)
ANION GAP SERPL CALC-SCNC: 11 MMO/L — SIGNIFICANT CHANGE UP (ref 7–14)
ANION GAP SERPL CALC-SCNC: 12 MMO/L — SIGNIFICANT CHANGE UP (ref 7–14)
AST SERPL-CCNC: 104 U/L — HIGH (ref 4–32)
AST SERPL-CCNC: 112 U/L — HIGH (ref 4–32)
BASE EXCESS BLDA CALC-SCNC: 2.4 MMOL/L — SIGNIFICANT CHANGE UP
BASE EXCESS BLDA CALC-SCNC: 2.8 MMOL/L — SIGNIFICANT CHANGE UP
BASE EXCESS BLDA CALC-SCNC: 4.4 MMOL/L — SIGNIFICANT CHANGE UP
BASOPHILS # BLD AUTO: 0.05 K/UL — SIGNIFICANT CHANGE UP (ref 0–0.2)
BASOPHILS NFR BLD AUTO: 0.5 % — SIGNIFICANT CHANGE UP (ref 0–2)
BILIRUB DIRECT SERPL-MCNC: 2.9 MG/DL — HIGH (ref 0.1–0.2)
BILIRUB SERPL-MCNC: 3.8 MG/DL — HIGH (ref 0.2–1.2)
BILIRUB SERPL-MCNC: 3.8 MG/DL — HIGH (ref 0.2–1.2)
BUN SERPL-MCNC: 12 MG/DL — SIGNIFICANT CHANGE UP (ref 7–23)
BUN SERPL-MCNC: 14 MG/DL — SIGNIFICANT CHANGE UP (ref 7–23)
BUN SERPL-MCNC: 16 MG/DL — SIGNIFICANT CHANGE UP (ref 7–23)
CA-I BLD-SCNC: 1.06 MMOL/L — SIGNIFICANT CHANGE UP (ref 1.03–1.23)
CA-I BLDA-SCNC: 1.13 MMOL/L — LOW (ref 1.15–1.29)
CA-I BLDA-SCNC: 1.17 MMOL/L — SIGNIFICANT CHANGE UP (ref 1.15–1.29)
CA-I BLDA-SCNC: 1.19 MMOL/L — SIGNIFICANT CHANGE UP (ref 1.15–1.29)
CALCIUM SERPL-MCNC: 8 MG/DL — LOW (ref 8.4–10.5)
CALCIUM SERPL-MCNC: 8.1 MG/DL — LOW (ref 8.4–10.5)
CALCIUM SERPL-MCNC: 8.3 MG/DL — LOW (ref 8.4–10.5)
CHLORIDE SERPL-SCNC: 95 MMOL/L — LOW (ref 98–107)
CHLORIDE SERPL-SCNC: 95 MMOL/L — LOW (ref 98–107)
CHLORIDE SERPL-SCNC: 96 MMOL/L — LOW (ref 98–107)
CO2 SERPL-SCNC: 24 MMOL/L — SIGNIFICANT CHANGE UP (ref 22–31)
CO2 SERPL-SCNC: 25 MMOL/L — SIGNIFICANT CHANGE UP (ref 22–31)
CO2 SERPL-SCNC: 26 MMOL/L — SIGNIFICANT CHANGE UP (ref 22–31)
CREAT SERPL-MCNC: 0.48 MG/DL — LOW (ref 0.5–1.3)
CREAT SERPL-MCNC: 0.55 MG/DL — SIGNIFICANT CHANGE UP (ref 0.5–1.3)
CREAT SERPL-MCNC: 0.6 MG/DL — SIGNIFICANT CHANGE UP (ref 0.5–1.3)
EOSINOPHIL # BLD AUTO: 0.38 K/UL — SIGNIFICANT CHANGE UP (ref 0–0.5)
EOSINOPHIL NFR BLD AUTO: 3.9 % — SIGNIFICANT CHANGE UP (ref 0–6)
GLUCOSE BLDA-MCNC: 167 MG/DL — HIGH (ref 70–99)
GLUCOSE BLDA-MCNC: 183 MG/DL — HIGH (ref 70–99)
GLUCOSE BLDA-MCNC: 187 MG/DL — HIGH (ref 70–99)
GLUCOSE SERPL-MCNC: 166 MG/DL — HIGH (ref 70–99)
GLUCOSE SERPL-MCNC: 183 MG/DL — HIGH (ref 70–99)
GLUCOSE SERPL-MCNC: 188 MG/DL — HIGH (ref 70–99)
HCO3 BLDA-SCNC: 27 MMOL/L — HIGH (ref 22–26)
HCO3 BLDA-SCNC: 27 MMOL/L — HIGH (ref 22–26)
HCO3 BLDA-SCNC: 29 MMOL/L — HIGH (ref 22–26)
HCT VFR BLD CALC: 26 % — LOW (ref 34.5–45)
HCT VFR BLD CALC: 27.3 % — LOW (ref 34.5–45)
HCT VFR BLDA CALC: 25.3 % — LOW (ref 34.5–46.5)
HCT VFR BLDA CALC: 27.6 % — LOW (ref 34.5–46.5)
HCT VFR BLDA CALC: 28.7 % — LOW (ref 34.5–46.5)
HGB BLD-MCNC: 8.5 G/DL — LOW (ref 11.5–15.5)
HGB BLD-MCNC: 8.8 G/DL — LOW (ref 11.5–15.5)
HGB BLDA-MCNC: 8.1 G/DL — LOW (ref 11.5–15.5)
HGB BLDA-MCNC: 8.9 G/DL — LOW (ref 11.5–15.5)
HGB BLDA-MCNC: 9.2 G/DL — LOW (ref 11.5–15.5)
IMM GRANULOCYTES NFR BLD AUTO: 0.7 % — SIGNIFICANT CHANGE UP (ref 0–1.5)
LACTATE BLDA-SCNC: 1.5 MMOL/L — SIGNIFICANT CHANGE UP (ref 0.5–2)
LACTATE BLDA-SCNC: 1.8 MMOL/L — SIGNIFICANT CHANGE UP (ref 0.5–2)
LACTATE BLDA-SCNC: 1.8 MMOL/L — SIGNIFICANT CHANGE UP (ref 0.5–2)
LYMPHOCYTES # BLD AUTO: 1.35 K/UL — SIGNIFICANT CHANGE UP (ref 1–3.3)
LYMPHOCYTES # BLD AUTO: 13.9 % — SIGNIFICANT CHANGE UP (ref 13–44)
MAGNESIUM SERPL-MCNC: 1.7 MG/DL — SIGNIFICANT CHANGE UP (ref 1.6–2.6)
MAGNESIUM SERPL-MCNC: 1.8 MG/DL — SIGNIFICANT CHANGE UP (ref 1.6–2.6)
MAGNESIUM SERPL-MCNC: 2.1 MG/DL — SIGNIFICANT CHANGE UP (ref 1.6–2.6)
MCHC RBC-ENTMCNC: 28.1 PG — SIGNIFICANT CHANGE UP (ref 27–34)
MCHC RBC-ENTMCNC: 28.3 PG — SIGNIFICANT CHANGE UP (ref 27–34)
MCHC RBC-ENTMCNC: 32.2 % — SIGNIFICANT CHANGE UP (ref 32–36)
MCHC RBC-ENTMCNC: 32.7 % — SIGNIFICANT CHANGE UP (ref 32–36)
MCV RBC AUTO: 85.8 FL — SIGNIFICANT CHANGE UP (ref 80–100)
MCV RBC AUTO: 87.8 FL — SIGNIFICANT CHANGE UP (ref 80–100)
MONOCYTES # BLD AUTO: 1.48 K/UL — HIGH (ref 0–0.9)
MONOCYTES NFR BLD AUTO: 15.3 % — HIGH (ref 2–14)
NEUTROPHILS # BLD AUTO: 6.36 K/UL — SIGNIFICANT CHANGE UP (ref 1.8–7.4)
NEUTROPHILS NFR BLD AUTO: 65.7 % — SIGNIFICANT CHANGE UP (ref 43–77)
NRBC # FLD: 0 K/UL — LOW (ref 25–125)
NRBC # FLD: 0.02 K/UL — LOW (ref 25–125)
PCO2 BLDA: 33 MMHG — SIGNIFICANT CHANGE UP (ref 32–48)
PCO2 BLDA: 33 MMHG — SIGNIFICANT CHANGE UP (ref 32–48)
PCO2 BLDA: 38 MMHG — SIGNIFICANT CHANGE UP (ref 32–48)
PH BLDA: 7.45 PH — SIGNIFICANT CHANGE UP (ref 7.35–7.45)
PH BLDA: 7.5 PH — HIGH (ref 7.35–7.45)
PH BLDA: 7.53 PH — HIGH (ref 7.35–7.45)
PHOSPHATE SERPL-MCNC: 2.1 MG/DL — LOW (ref 2.5–4.5)
PHOSPHATE SERPL-MCNC: 2.6 MG/DL — SIGNIFICANT CHANGE UP (ref 2.5–4.5)
PHOSPHATE SERPL-MCNC: 3.6 MG/DL — SIGNIFICANT CHANGE UP (ref 2.5–4.5)
PLATELET # BLD AUTO: 223 K/UL — SIGNIFICANT CHANGE UP (ref 150–400)
PLATELET # BLD AUTO: 248 K/UL — SIGNIFICANT CHANGE UP (ref 150–400)
PMV BLD: 11.1 FL — SIGNIFICANT CHANGE UP (ref 7–13)
PMV BLD: 11.2 FL — SIGNIFICANT CHANGE UP (ref 7–13)
PO2 BLDA: 102 MMHG — SIGNIFICANT CHANGE UP (ref 83–108)
PO2 BLDA: 78 MMHG — LOW (ref 83–108)
PO2 BLDA: 81 MMHG — LOW (ref 83–108)
POTASSIUM BLDA-SCNC: 2.9 MMOL/L — LOW (ref 3.4–4.5)
POTASSIUM BLDA-SCNC: 3 MMOL/L — LOW (ref 3.4–4.5)
POTASSIUM BLDA-SCNC: 4 MMOL/L — SIGNIFICANT CHANGE UP (ref 3.4–4.5)
POTASSIUM SERPL-MCNC: 3.1 MMOL/L — LOW (ref 3.5–5.3)
POTASSIUM SERPL-MCNC: 3.4 MMOL/L — LOW (ref 3.5–5.3)
POTASSIUM SERPL-MCNC: 4.3 MMOL/L — SIGNIFICANT CHANGE UP (ref 3.5–5.3)
POTASSIUM SERPL-SCNC: 3.1 MMOL/L — LOW (ref 3.5–5.3)
POTASSIUM SERPL-SCNC: 3.4 MMOL/L — LOW (ref 3.5–5.3)
POTASSIUM SERPL-SCNC: 4.3 MMOL/L — SIGNIFICANT CHANGE UP (ref 3.5–5.3)
PREALB SERPL-MCNC: 7 MG/DL — LOW (ref 20–40)
PROT SERPL-MCNC: 5.8 G/DL — LOW (ref 6–8.3)
PROT SERPL-MCNC: 6 G/DL — SIGNIFICANT CHANGE UP (ref 6–8.3)
RBC # BLD: 3.03 M/UL — LOW (ref 3.8–5.2)
RBC # BLD: 3.11 M/UL — LOW (ref 3.8–5.2)
RBC # FLD: 17.2 % — HIGH (ref 10.3–14.5)
RBC # FLD: 17.7 % — HIGH (ref 10.3–14.5)
SAO2 % BLDA: 96.6 % — SIGNIFICANT CHANGE UP (ref 95–99)
SAO2 % BLDA: 97.5 % — SIGNIFICANT CHANGE UP (ref 95–99)
SAO2 % BLDA: 98.1 % — SIGNIFICANT CHANGE UP (ref 95–99)
SODIUM BLDA-SCNC: 128 MMOL/L — LOW (ref 136–146)
SODIUM BLDA-SCNC: 129 MMOL/L — LOW (ref 136–146)
SODIUM BLDA-SCNC: 131 MMOL/L — LOW (ref 136–146)
SODIUM SERPL-SCNC: 130 MMOL/L — LOW (ref 135–145)
SODIUM SERPL-SCNC: 131 MMOL/L — LOW (ref 135–145)
SODIUM SERPL-SCNC: 133 MMOL/L — LOW (ref 135–145)
VANCOMYCIN TROUGH SERPL-MCNC: 22.4 UG/ML — HIGH (ref 10–20)
WBC # BLD: 9.57 K/UL — SIGNIFICANT CHANGE UP (ref 3.8–10.5)
WBC # BLD: 9.69 K/UL — SIGNIFICANT CHANGE UP (ref 3.8–10.5)
WBC # FLD AUTO: 9.57 K/UL — SIGNIFICANT CHANGE UP (ref 3.8–10.5)
WBC # FLD AUTO: 9.69 K/UL — SIGNIFICANT CHANGE UP (ref 3.8–10.5)

## 2019-01-28 PROCEDURE — 99292 CRITICAL CARE ADDL 30 MIN: CPT

## 2019-01-28 PROCEDURE — 99291 CRITICAL CARE FIRST HOUR: CPT

## 2019-01-28 PROCEDURE — 71045 X-RAY EXAM CHEST 1 VIEW: CPT | Mod: 26

## 2019-01-28 RX ORDER — MIDAZOLAM HYDROCHLORIDE 1 MG/ML
2 INJECTION, SOLUTION INTRAMUSCULAR; INTRAVENOUS EVERY 4 HOURS
Qty: 0 | Refills: 0 | Status: DISCONTINUED | OUTPATIENT
Start: 2019-01-28 | End: 2019-01-30

## 2019-01-28 RX ORDER — ELECTROLYTE SOLUTION,INJ
1 VIAL (ML) INTRAVENOUS
Qty: 0 | Refills: 0 | Status: DISCONTINUED | OUTPATIENT
Start: 2019-01-28 | End: 2019-01-28

## 2019-01-28 RX ORDER — MAGNESIUM SULFATE 500 MG/ML
2 VIAL (ML) INJECTION ONCE
Qty: 0 | Refills: 0 | Status: COMPLETED | OUTPATIENT
Start: 2019-01-28 | End: 2019-01-28

## 2019-01-28 RX ORDER — ACETAMINOPHEN 500 MG
1000 TABLET ORAL ONCE
Qty: 0 | Refills: 0 | Status: COMPLETED | OUTPATIENT
Start: 2019-01-28 | End: 2019-01-29

## 2019-01-28 RX ORDER — ACETAMINOPHEN 500 MG
1000 TABLET ORAL ONCE
Qty: 0 | Refills: 0 | Status: COMPLETED | OUTPATIENT
Start: 2019-01-28 | End: 2019-01-28

## 2019-01-28 RX ORDER — FUROSEMIDE 40 MG
40 TABLET ORAL ONCE
Qty: 0 | Refills: 0 | Status: COMPLETED | OUTPATIENT
Start: 2019-01-28 | End: 2019-01-28

## 2019-01-28 RX ORDER — POTASSIUM PHOSPHATE, MONOBASIC POTASSIUM PHOSPHATE, DIBASIC 236; 224 MG/ML; MG/ML
15 INJECTION, SOLUTION INTRAVENOUS ONCE
Qty: 0 | Refills: 0 | Status: COMPLETED | OUTPATIENT
Start: 2019-01-28 | End: 2019-01-28

## 2019-01-28 RX ORDER — VANCOMYCIN HCL 1 G
1000 VIAL (EA) INTRAVENOUS DAILY
Qty: 0 | Refills: 0 | Status: DISCONTINUED | OUTPATIENT
Start: 2019-01-28 | End: 2019-01-30

## 2019-01-28 RX ORDER — BUMETANIDE 0.25 MG/ML
1 INJECTION INTRAMUSCULAR; INTRAVENOUS
Qty: 20 | Refills: 0 | Status: DISCONTINUED | OUTPATIENT
Start: 2019-01-28 | End: 2019-01-29

## 2019-01-28 RX ORDER — ALBUMIN HUMAN 25 %
50 VIAL (ML) INTRAVENOUS ONCE
Qty: 0 | Refills: 0 | Status: COMPLETED | OUTPATIENT
Start: 2019-01-28 | End: 2019-01-28

## 2019-01-28 RX ORDER — I.V. FAT EMULSION 20 G/100ML
12.5 EMULSION INTRAVENOUS
Qty: 30 | Refills: 0 | Status: DISCONTINUED | OUTPATIENT
Start: 2019-01-28 | End: 2019-01-30

## 2019-01-28 RX ORDER — HYDROMORPHONE HYDROCHLORIDE 2 MG/ML
0.5 INJECTION INTRAMUSCULAR; INTRAVENOUS; SUBCUTANEOUS ONCE
Qty: 0 | Refills: 0 | Status: DISCONTINUED | OUTPATIENT
Start: 2019-01-28 | End: 2019-01-28

## 2019-01-28 RX ORDER — VANCOMYCIN HCL 1 G
500 VIAL (EA) INTRAVENOUS EVERY 12 HOURS
Qty: 0 | Refills: 0 | Status: DISCONTINUED | OUTPATIENT
Start: 2019-01-28 | End: 2019-01-28

## 2019-01-28 RX ORDER — POTASSIUM PHOSPHATE, MONOBASIC POTASSIUM PHOSPHATE, DIBASIC 236; 224 MG/ML; MG/ML
30 INJECTION, SOLUTION INTRAVENOUS ONCE
Qty: 0 | Refills: 0 | Status: DISCONTINUED | OUTPATIENT
Start: 2019-01-28 | End: 2019-01-28

## 2019-01-28 RX ORDER — POTASSIUM CHLORIDE 20 MEQ
20 PACKET (EA) ORAL
Qty: 0 | Refills: 0 | Status: COMPLETED | OUTPATIENT
Start: 2019-01-28 | End: 2019-01-28

## 2019-01-28 RX ADMIN — Medication 2: at 22:54

## 2019-01-28 RX ADMIN — MEROPENEM 100 MILLIGRAM(S): 1 INJECTION INTRAVENOUS at 22:52

## 2019-01-28 RX ADMIN — CHLORHEXIDINE GLUCONATE 15 MILLILITER(S): 213 SOLUTION TOPICAL at 05:00

## 2019-01-28 RX ADMIN — Medication 1 EACH: at 17:01

## 2019-01-28 RX ADMIN — Medication 100 MILLIEQUIVALENT(S): at 20:33

## 2019-01-28 RX ADMIN — DEXMEDETOMIDINE HYDROCHLORIDE IN 0.9% SODIUM CHLORIDE 7.17 MICROGRAM(S)/KG/HR: 4 INJECTION INTRAVENOUS at 20:33

## 2019-01-28 RX ADMIN — CHLORHEXIDINE GLUCONATE 15 MILLILITER(S): 213 SOLUTION TOPICAL at 17:36

## 2019-01-28 RX ADMIN — ACETAZOLAMIDE 250 MILLIGRAM(S): 250 TABLET ORAL at 01:34

## 2019-01-28 RX ADMIN — CHLORHEXIDINE GLUCONATE 1 APPLICATION(S): 213 SOLUTION TOPICAL at 22:55

## 2019-01-28 RX ADMIN — Medication 2: at 17:32

## 2019-01-28 RX ADMIN — Medication 50 GRAM(S): at 16:41

## 2019-01-28 RX ADMIN — MICAFUNGIN SODIUM 105 MILLIGRAM(S): 100 INJECTION, POWDER, LYOPHILIZED, FOR SOLUTION INTRAVENOUS at 21:11

## 2019-01-28 RX ADMIN — Medication 1 DROP(S): at 17:31

## 2019-01-28 RX ADMIN — Medication 1 APPLICATION(S): at 11:21

## 2019-01-28 RX ADMIN — MIDAZOLAM HYDROCHLORIDE 2 MILLIGRAM(S): 1 INJECTION, SOLUTION INTRAMUSCULAR; INTRAVENOUS at 14:18

## 2019-01-28 RX ADMIN — POTASSIUM PHOSPHATE, MONOBASIC POTASSIUM PHOSPHATE, DIBASIC 62.5 MILLIMOLE(S): 236; 224 INJECTION, SOLUTION INTRAVENOUS at 16:42

## 2019-01-28 RX ADMIN — Medication 400 MILLIGRAM(S): at 13:17

## 2019-01-28 RX ADMIN — MEROPENEM 100 MILLIGRAM(S): 1 INJECTION INTRAVENOUS at 14:04

## 2019-01-28 RX ADMIN — I.V. FAT EMULSION 12.5 ML/HR: 20 EMULSION INTRAVENOUS at 16:42

## 2019-01-28 RX ADMIN — Medication 250 MILLIGRAM(S): at 05:33

## 2019-01-28 RX ADMIN — HYDROMORPHONE HYDROCHLORIDE 0.5 MILLIGRAM(S): 2 INJECTION INTRAMUSCULAR; INTRAVENOUS; SUBCUTANEOUS at 00:40

## 2019-01-28 RX ADMIN — BUMETANIDE 5 MG/HR: 0.25 INJECTION INTRAMUSCULAR; INTRAVENOUS at 11:22

## 2019-01-28 RX ADMIN — Medication 50 MILLILITER(S): at 10:03

## 2019-01-28 RX ADMIN — ENOXAPARIN SODIUM 40 MILLIGRAM(S): 100 INJECTION SUBCUTANEOUS at 11:21

## 2019-01-28 RX ADMIN — Medication 40 MILLIGRAM(S): at 10:13

## 2019-01-28 RX ADMIN — Medication 2: at 05:03

## 2019-01-28 RX ADMIN — HYDROMORPHONE HYDROCHLORIDE 0.5 MILLIGRAM(S): 2 INJECTION INTRAMUSCULAR; INTRAVENOUS; SUBCUTANEOUS at 00:55

## 2019-01-28 RX ADMIN — Medication 2: at 01:35

## 2019-01-28 RX ADMIN — Medication 1 DROP(S): at 05:03

## 2019-01-28 RX ADMIN — Medication 100 MILLIEQUIVALENT(S): at 19:00

## 2019-01-28 RX ADMIN — POTASSIUM PHOSPHATE, MONOBASIC POTASSIUM PHOSPHATE, DIBASIC 62.5 MILLIMOLE(S): 236; 224 INJECTION, SOLUTION INTRAVENOUS at 05:34

## 2019-01-28 RX ADMIN — Medication 2: at 09:59

## 2019-01-28 RX ADMIN — Medication 2: at 14:05

## 2019-01-28 RX ADMIN — MEROPENEM 100 MILLIGRAM(S): 1 INJECTION INTRAVENOUS at 05:03

## 2019-01-28 RX ADMIN — Medication 100 MILLIEQUIVALENT(S): at 17:32

## 2019-01-28 NOTE — PROGRESS NOTE ADULT - SUBJECTIVE AND OBJECTIVE BOX
SICU Daily Progress Note  =====================================================  Interval Events:    Patient febrile to 101 today, monitor closely for now  Patient with positive fluid balance and persistent pleural effusions -> Diuresing with lasix/bumex, giving albumin  Patient alkalotic to 7.50 and hypoxic with pO2 78, FiO2 increased to 40%, resp rate decreased to 14/min, will continue with CPAP       HPI:  54y Female PMHx HTN, T2DM, asthma, depression with recent diagnosis of invasive ampullary adenocarcinoma of the CBD admitted for cholangitis on 1/5, s/p ERCP 1/8, whipple 1/11, rapid response for GIB on floor 1/18 s/p MTP, RTOR s/p celiotomy, evacuation of hematoma, GDA bleed stopped and sutured, Abthera vac.   Closure of abdomen with strattice mesh on 1/21, with inna drains and left lower JOURDAN drain in subcutaneous   Incisional bleed 1/23, bedside skin staples removed, JOURDAN drain removed, hemorrhage controlled, for continuation of ICU care      Allergies: No Known Allergies      MEDICATIONS:   --------------------------------------------------------------------------------------  Neurologic Medications  dexmedetomidine Infusion 0.5 MICROgram(s)/kG/Hr IV Continuous <Continuous>  midazolam Injectable 2 milliGRAM(s) IV Push every 4 hours PRN agitation    Respiratory Medications    Cardiovascular Medications  buMETAnide Infusion 1 mG/Hr IV Continuous <Continuous>    Gastrointestinal Medications  dextrose 5%. 1000 milliLiter(s) IV Continuous <Continuous>  fat emulsion (Fish Oil and Plant Based) 20% Infusion 12.5 mL/Hr IV Continuous <Continuous>  fat emulsion (Fish Oil and Plant Based) 20% Infusion 12.5 mL/Hr IV Continuous <Continuous>  magnesium sulfate  IVPB 2 Gram(s) IV Intermittent once  Parenteral Nutrition - Adult 1 Each TPN Continuous <Continuous>  Parenteral Nutrition - Adult 1 Each TPN Continuous <Continuous>  potassium chloride  20 mEq/100 mL IVPB 20 milliEquivalent(s) IV Intermittent every 1 hour  potassium phosphate IVPB 15 milliMole(s) IV Intermittent once    Genitourinary Medications    Hematologic/Oncologic Medications  enoxaparin Injectable 40 milliGRAM(s) SubCutaneous daily    Antimicrobial/Immunologic Medications  meropenem  IVPB 1000 milliGRAM(s) IV Intermittent every 8 hours  meropenem  IVPB      micafungin IVPB      micafungin IVPB 100 milliGRAM(s) IV Intermittent every 24 hours  vancomycin  IVPB 750 milliGRAM(s) IV Intermittent every 12 hours    Endocrine/Metabolic Medications  insulin lispro (HumaLOG) corrective regimen sliding scale   SubCutaneous every 4 hours    Topical/Other Medications  artificial  tears Solution 1 Drop(s) Both EYES every 12 hours  chlorhexidine 0.12% Liquid 15 milliLiter(s) Oral Mucosa two times a day  chlorhexidine 4% Liquid 1 Application(s) Topical <User Schedule>  petrolatum Ophthalmic Ointment 1 Application(s) Both EYES daily    --------------------------------------------------------------------------------------    VITAL SIGNS, INS/OUTS (last 24 hours):  --------------------------------------------------------------------------------------  ((Insert SICU Vitals/Is+Os here))***  --------------------------------------------------------------------------------------    EXAM  NEUROLOGY  Exam: Normal, NAD, alert, oriented x3, no focal deficits.     HEENT  Exam: Normocephalic, atraumatic    RESPIRATORY  Exam: Lungs clear to auscultation, Normal expansion/effort.   Mechanical Ventilation: Mode: CPAP with PS, FiO2: 40, PEEP: 5, PS: 10    CARDIOVASCULAR  Exam: S1, S2.  Regular rate and rhythm.      GI/NUTRITION  Exam: Abdomen soft, mildly tender, Non-distended.   Wound:  c/d/i  Current Diet:  NPO    VASCULAR  Exam: Extremities warm, pink, well-perfused.     SKIN  Exam: Good skin turgor, no skin breakdown.       METABOLIC/FLUIDS/ELECTROLYTES  dextrose 5%. 1000 milliLiter(s) IV Continuous <Continuous>  fat emulsion (Fish Oil and Plant Based) 20% Infusion 12.5 mL/Hr IV Continuous <Continuous>  fat emulsion (Fish Oil and Plant Based) 20% Infusion 12.5 mL/Hr IV Continuous <Continuous>  magnesium sulfate  IVPB 2 Gram(s) IV Intermittent once  Parenteral Nutrition - Adult 1 Each TPN Continuous <Continuous>  Parenteral Nutrition - Adult 1 Each TPN Continuous <Continuous>  potassium chloride  20 mEq/100 mL IVPB 20 milliEquivalent(s) IV Intermittent every 1 hour  potassium phosphate IVPB 15 milliMole(s) IV Intermittent once      HEMATOLOGIC  [x] VTE Prophylaxis: enoxaparin Injectable 40 milliGRAM(s) SubCutaneous daily    Transfusions:	[] PRBC	[] Platelets		[] FFP	[] Cryoprecipitate    INFECTIOUS DISEASE  Antimicrobials/Immunologic Medications:  meropenem  IVPB 1000 milliGRAM(s) IV Intermittent every 8 hours  meropenem  IVPB      micafungin IVPB      micafungin IVPB 100 milliGRAM(s) IV Intermittent every 24 hours  vancomycin  IVPB 750 milliGRAM(s) IV Intermittent every 12 hours    Day #      of     ***    Tubes/Lines/Drains  ***  [x] Peripheral IV  [] Central Venous Line     	[] R	[x] L	[x] IJ	[] Fem	[] SC	Date Placed:   [] Arterial Line		[x] R	[] L	[] Fem	[] Rad	[x] Ax	Date Placed:   [] PICC		[] Midline		[] Mediport  [x] Urinary Catheter		Date Placed:   [x] Necessity of urinary, arterial, and venous catheters discussed    LABS  --------------------------------------------------------------------------------------                        8.5    9.57  )-----------( 248      ( 28 Jan 2019 14:00 )             26.0     Blood Gas Arterial - Lytes,Hgb,iCa,Lact (01.28.19 @ 14:00)    pH, Arterial: 7.50 pH    pCO2, Arterial: 33 mmHg    pO2, Arterial: 78 mmHg    HCO3, Arterial: 27 mmol/L    Base Excess, Arterial: 2.8: BASE EXCESS: REFERENCE RANGE = 0 (+/-) 2 mmol/l mmol/L    Oxygen Saturation, Arterial: 96.6 %    Blood Gas Arterial - Sodium: 129 mmol/L    Blood Gas Arterial - Potassium: 2.9 mmol/L    Blood Gas Arterial - Glucose: 183 mg/dL    Blood Gas Arterial - Hemoglobin: 8.1 g/dL    Blood Gas Arterial - Hematocrit: 25.3 %    Blood Gas Arterial - Calcium, Ionized: 1.13 mmol/L    Blood Gas Arterial - Lactate: 1.8: Please note updated reference range. mmol/L      --------------------------------------------------------------------------------------    OTHER LABORATORY:     IMAGING STUDIES:     CXR:    < from: Xray Chest 1 View- PORTABLE-Routine (01.28.19 @ 01:37) >    EXAM:  XR CHEST PORTABLE ROUTINE 1V        PROCEDURE DATE:  Jan 28 2019         INTERPRETATION:  TIME OF EXAM: January 28, 2019 at 12:59 AM      CLINICAL INFORMATION: Post abdominal surgery.    TECHNIQUE:   Portable chest    INTERPRETATION:     Endotracheal and enteric tubes in place in addition to a left IJ line.    Increasing opacity of the left hemithorax has the appearance of a   worsening layering effusion with underlying atelectasis. Bilateral   perihilar opacities again visible representing multifocal pneumonia or   pulmonary edema. No pneumothorax.      COMPARISON:  January 27      IMPRESSION:  Follow-up with increasing left effusion and bilateral   pulmonary opacities.    < end of copied text >

## 2019-01-28 NOTE — PROGRESS NOTE ADULT - ASSESSMENT
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  >> External tissue expander device to remain in place until sufficient skin laxity to allow for skin closure. Do not remove or otherwise touch this device. It will automatically tighten on its own without external intervention. It will also maintain appropriate tension on the wound over time.   >> Continue BID packing to the abdominal wound (around the external tissue expander device) with normal-saline-moistened Kerlix gauze.  >> Nutritional optimization.  >> Medical optimization.    Plastic Surgery [id number]

## 2019-01-28 NOTE — PROGRESS NOTE ADULT - ASSESSMENT
54y Female PMHx HTN, T2DM, asthma, depression with recent diagnosis of invasive ampullary adenocarcinoma of the CBD admitted for cholangitis on 1/5, s/p ERCP 1/8, whipple 1/11, rapid response for GIB on floor 1/18 s/p MTP, RTOR s/p celiotomy, evacuation of hematoma, GDA bleed stopped and sutured, Abthera vac. Closure of abdomen with strattice mesh on 1/21, with inna drains and left lower JOURDAN drain in subcutaneous. Incisional bleed 1/23, bedside skin staples removed, JOURADN drain removed, hemorrhage controlled, for continuation of ICU care.     Neuro:  -Pain control with Dilaudid    Resp:  - CPAP trial as tolerated, plan to extubate  - daily CXR and ABG     CV:  - vitals q1h, ct flotrac and A line monitoring    GI:  - ct TPN through dedicated port  - daily electrolyte with ionized Ca as on TPN    Renal:  - Strict I&O, monitor lytes, replete as needed  - IVF on hold for now   -Diamox    Endo:  - diabetic, FS q6h and low dose corrective regimen for hyperglycemia, needs better control   - TPN with insulin 10 U from today, ct to monitor     Heme:   -monitor H/H, stable   -ct lovenox vte ppx    ID:     - ct Meropenem/vancomycin for broadened coverage for now as pt continuously febrile and WBC elevated (Vanc was decreased as trough was high)  - UA positive, urine cx 1/21 candida, micafungin since 1/23, stop on 2/1  - intubated since 1/18  - Swanson to be continued for UOP monitoring in critically ill pt   - abdominal incision with external tissue expander device in place 1/24    Access: [facility] central line, 2 PIV, R axillary arterial line    Dispo: SICU 54y Female PMHx HTN, T2DM, asthma, depression with recent diagnosis of invasive ampullary adenocarcinoma of the CBD admitted for cholangitis on 1/5, s/p ERCP 1/8, whipple 1/11, rapid response for GIB on floor 1/18 s/p MTP, RTOR s/p celiotomy, evacuation of hematoma, GDA bleed stopped and sutured, Abthera vac. Closure of abdomen with strattice mesh on 1/21, with inna drains and left lower JOURDAN drain in subcutaneous. Incisional bleed 1/23, bedside skin staples removed, JOURDAN drain removed, hemorrhage controlled, for continuation of ICU care.     Neuro:  -Pain control with Dilaudid  -Precedex was increased  -Versed PRN     Resp:  - CPAP trial as tolerated currently on 10/5 will try 5/5, plan to extubate  - daily CXR and ABG     CV:  - vitals q1h, ct flotrac and A line monitoring    GI:  - ct TPN through dedicated port  - daily electrolyte with ionized Ca as on TPN    Renal:  - Strict I&O, monitor lytes, replete as needed  - IVF on hold for now   -Bumex drip, Lasix 40, Albumin 50 (25%)  -Check labs in PM    Endo:  - diabetic, FS q6h and low dose corrective regimen for hyperglycemia, needs better control   - TPN with insulin 10 U from today, ct to monitor     Heme:   -monitor H/H, stable   -ct lovenox vte ppx    ID:   - ct Meropenem/vancomycin for broadened coverage for now as pt continuously febrile and WBC elevated (Vanc was decreased as trough was high)  - UA positive, urine cx 1/21 candida, micafungin since 1/23, stop on 2/1  - intubated since 1/18  - Swanson to be continued for UOP monitoring in critically ill pt   - abdominal incision with external tissue expander device in place 1/24    Access: [facility] central line, 2 PIV, R axillary arterial line    Dispo: SICU 54y Female PMHx HTN, T2DM, asthma, depression with recent diagnosis of invasive ampullary adenocarcinoma of the CBD admitted for cholangitis on 1/5, s/p ERCP 1/8, whipple 1/11, rapid response for GIB on floor 1/18 s/p MTP, RTOR s/p celiotomy, evacuation of hematoma, GDA bleed stopped and sutured, Abthera vac. Closure of abdomen with strattice mesh on 1/21, with inna drains and left lower JOURDAN drain in subcutaneous. Incisional bleed 1/23, bedside skin staples removed, JOURDAN drain removed, hemorrhage controlled, for continuation of ICU care.     Neuro:  -Pain control with Dilaudid  -Precedex was increased  -Versed PRN     Resp:  - CPAP trial as tolerated currently on 10/5 will try 5/5, plan to extubate  - daily CXR and ABG     CV:  - vitals q1h, ct flotrac and A line monitoring    GI:  - ct TPN through dedicated port  - daily electrolyte with ionized Ca as on TPN    Renal:  - Strict I&O, monitor lytes, replete as needed  - IVF on hold for now   -Bumex drip, Lasix 40, Albumin 50 (25%)  -Check labs in PM    Endo:  - diabetic, FS q6h and low dose corrective regimen for hyperglycemia, needs better control   - TPN with insulin 10 U from today, ct to monitor     Heme:   -monitor H/H, stable   -ct lovenox vte ppx    ID:   - ct Meropenem/vancomycin for broadened coverage for now as pt continuously febrile and WBC elevated (Vanc was decreased as trough was high)- Will stop on 1/30  - UA positive, urine cx 1/21 candida, micafungin since 1/23, stop on 2/1  - intubated since 1/18  - Swanson to be continued for UOP monitoring in critically ill pt   - abdominal incision with external tissue expander device in place 1/24  -If she spikes a high fever send cultures and take central line out    Access: [facility] central line, 2 PIV, R axillary arterial line    Dispo: SICU

## 2019-01-28 NOTE — PROGRESS NOTE ADULT - ATTENDING COMMENTS
54y Female PMHx HTN, T2DM, asthma, depression with recent diagnosis of invasive ampullary adenocarcinoma of the CBD admitted for cholangitis on 1/5, s/p ERCP 1/8, whipple 1/11, rapid response for GIB on floor 1/18 s/p MTP, RTOR s/p celiotomy, evacuation of hematoma, GDA bleed stopped and sutured, Abthera vac. Closure of abdomen with strattice mesh on 1/21, with inna drains and left lower JOURDAN drain in subcutaneous. Incisional bleed 1/23, bedside skin staples removed, JOURDAN drain removed, hemorrhage controlled, for continuation of ICU care.     Neuro:  -Pain control with Dilaudid  -Precedex  -Versed PRN     Resp:  - will continue CPAP today and overnight  - daily ABG and CXR     CV:  - vitals q1h, ct flotrac and A line monitoring    GI:  - ct TPN through dedicated port  - daily electrolyte with ionized Ca as on TPN    Renal:  - Strict I&O, monitor lytes, replete as needed  - IVF on hold for now   -Bumex drip, Lasix 40, Albumin  -pm BMP reviewed, electrolyte repletion as needed    Endo:  - diabetic, FS q6h and low dose corrective regimen for hyperglycemia, needs better control   - TPN with insulin 10 U from today, ct to monitor     Heme:   -monitor H/H, stable   -ct lovenox vte ppx    ID:   - ct Meropenem/vancomycin for broadened coverage for now as pt continuously febrile and WBC elevated (Vanc was decreased as trough was high)- Will stop on 1/30  - UA positive, urine cx 1/21 candida, micafungin since 1/23, stop on 2/1  - intubated since 1/18  - Swanson to be continued for UOP monitoring in critically ill pt   - abdominal incision with external tissue expander device in place 1/24  -If she spikes a high fever send cultures and take central line out    Access: [facility] central line, R axillary arterial line    Dispo: SICU  CCDx. respiratory failure, fluid overload.  The patient is a critical care patient with life threatening hemodynamic and metabolic instability in SICU.  I have personally interviewed when possible and examined the patient, reviewed data and laboratory tests/x-rays and all pertinent electronic images.  I was physically present for the key portions of the evaluation and management (E/M) service provided.   The SICU team has a constant risk benefit analyzes discussion with the primary team, all consultants, House Staff and PA's on all decisions.  These diagnoses are unrelated to the surgical procedure noted above.  I meet with family if needed to get further history, discuss the case and make care decisions for this patient who might not be able to participate.  Time involved in performance of separately billable procedures was not counted toward my critical care time. There is no overlap.  I spent 55-75 minutes of critical care time for the diagnoses, assessment, plan and interventions.

## 2019-01-28 NOTE — PROGRESS NOTE ADULT - SUBJECTIVE AND OBJECTIVE BOX
SICU Daily Progress Note  =====================================================  54y Female PMHx HTN, T2DM, asthma, depression with recent diagnosis of invasive ampullary adenocarcinoma of the CBD admitted for cholangitis on 1/5, s/p ERCP 1/8, whipple 1/11, rapid response for GIB on floor 1/18 s/p MTP, RTOR s/p celiotomy, evacuation of hematoma, GDA bleed stopped and sutured, Abthera vac.   Closure of abdomen with strattice mesh on 1/21, with inna drains and left lower JOURDAN drain in subcutaneous   Incisional bleed 1/23, bedside skin staples removed, JOURDAN drain removed, hemorrhage controlled, for continuation of ICU care     Interval/Overnight Events: PS 5/5 1/27 failed, placed on 10/5 - tolerated. Continued diuresis with Diamox for metabolic alkalosis. Plan to extubated 1/28    Allergies: No Known Allergies      MEDICATIONS:   --------------------------------------------------------------------------------------  Neurologic Medications  dexmedetomidine Infusion 0.5 MICROgram(s)/kG/Hr IV Continuous <Continuous>    Respiratory Medications    Cardiovascular Medications  acetazolamide Injectable 250 milliGRAM(s) IV Push every 8 hours    Gastrointestinal Medications  dextrose 5%. 1000 milliLiter(s) IV Continuous <Continuous>  fat emulsion (Fish Oil and Plant Based) 20% Infusion 12.5 mL/Hr IV Continuous <Continuous>  fat emulsion (Fish Oil and Plant Based) 20% Infusion 12.5 mL/Hr IV Continuous <Continuous>  Parenteral Nutrition - Adult 1 Each TPN Continuous <Continuous>    Genitourinary Medications    Hematologic/Oncologic Medications  enoxaparin Injectable 40 milliGRAM(s) SubCutaneous daily    Antimicrobial/Immunologic Medications  meropenem  IVPB 1000 milliGRAM(s) IV Intermittent every 8 hours  meropenem  IVPB      micafungin IVPB      micafungin IVPB 100 milliGRAM(s) IV Intermittent every 24 hours  vancomycin  IVPB 750 milliGRAM(s) IV Intermittent every 12 hours    Endocrine/Metabolic Medications  insulin lispro (HumaLOG) corrective regimen sliding scale   SubCutaneous every 4 hours    Topical/Other Medications  artificial  tears Solution 1 Drop(s) Both EYES every 12 hours  chlorhexidine 0.12% Liquid 15 milliLiter(s) Oral Mucosa two times a day  chlorhexidine 4% Liquid 1 Application(s) Topical <User Schedule>  petrolatum Ophthalmic Ointment 1 Application(s) Both EYES daily    --------------------------------------------------------------------------------------    VITAL SIGNS, INS/OUTS (last 24 hours):  --------------------------------------------------------------------------------------  ((Insert SICU Vitals/Is+Os here))***  --------------------------------------------------------------------------------------    EXAM  NEUROLOGY  Exam: Normal, NAD, alert, oriented x3, no focal deficits.     HEENT  Exam: Normocephalic, atraumatic    RESPIRATORY  Exam: Lungs clear to auscultation, Normal expansion/effort.   Mechanical Ventilation: Mode: CPAP with PS, FiO2: 30, PEEP: 5, PS: 13, MAP: 7.4    CARDIOVASCULAR  Exam: S1, S2.  Regular rate and rhythm.      GI/NUTRITION  Exam: Abdomen soft, mildly tender, Non-distended.   Wound:  c/d/i  Current Diet:  NPO    VASCULAR  Exam: Extremities warm, pink, well-perfused.     SKIN  Exam: Good skin turgor, no skin breakdown.     METABOLIC/FLUIDS/ELECTROLYTES  dextrose 5%. 1000 milliLiter(s) IV Continuous <Continuous>  fat emulsion (Fish Oil and Plant Based) 20% Infusion 12.5 mL/Hr IV Continuous <Continuous>  fat emulsion (Fish Oil and Plant Based) 20% Infusion 12.5 mL/Hr IV Continuous <Continuous>  Parenteral Nutrition - Adult 1 Each TPN Continuous <Continuous>      HEMATOLOGIC  [x] VTE Prophylaxis: enoxaparin Injectable 40 milliGRAM(s) SubCutaneous daily    Transfusions:	[] PRBC	[] Platelets		[] FFP	[] Cryoprecipitate    INFECTIOUS DISEASE  Antimicrobials/Immunologic Medications:  meropenem  IVPB 1000 milliGRAM(s) IV Intermittent every 8 hours  meropenem  IVPB      micafungin IVPB      micafungin IVPB 100 milliGRAM(s) IV Intermittent every 24 hours  vancomycin  IVPB 750 milliGRAM(s) IV Intermittent every 12 hours      Tubes/Lines/Drains   [x] Peripheral IV  [] Central Venous Line     	[] R	[] L	[] IJ	[] Fem	[] SC	Date Placed:   [] Arterial Line		[] R	[] L	[] Fem	[] Rad	[] Ax	Date Placed:   [] PICC		[] Midline		[] Mediport  [] Urinary Catheter		Date Placed:   [x] Necessity of urinary, arterial, and venous catheters discussed    LABS  --------------------------------------------------------------------------------------  ((Insert SICU Labs here))***  -------------------------------------------------------------------------------------- SICU Daily Progress Note  =====================================================  54y Female PMHx HTN, T2DM, asthma, depression with recent diagnosis of invasive ampullary adenocarcinoma of the CBD admitted for cholangitis on 1/5, s/p ERCP 1/8, whipple 1/11, rapid response for GIB on floor 1/18 s/p MTP, RTOR s/p celiotomy, evacuation of hematoma, GDA bleed stopped and sutured, Abthera vac.   Closure of abdomen with strattice mesh on 1/21, with inna drains and left lower JOURDAN drain in subcutaneous   Incisional bleed 1/23, bedside skin staples removed, JOURDAN drain removed, hemorrhage controlled, for continuation of ICU care     Interval/Overnight Events: PS 5/5 1/27 failed, placed on 10/5 - tolerated. Continued diuresis with Diamox for metabolic alkalosis. Plan to extubated 1/28    Allergies: No Known Allergies      MEDICATIONS:   --------------------------------------------------------------------------------------  Neurologic Medications  dexmedetomidine Infusion 0.5 MICROgram(s)/kG/Hr IV Continuous <Continuous>    Respiratory Medications    Cardiovascular Medications  acetazolamide Injectable 250 milliGRAM(s) IV Push every 8 hours    Gastrointestinal Medications  dextrose 5%. 1000 milliLiter(s) IV Continuous <Continuous>  fat emulsion (Fish Oil and Plant Based) 20% Infusion 12.5 mL/Hr IV Continuous <Continuous>  fat emulsion (Fish Oil and Plant Based) 20% Infusion 12.5 mL/Hr IV Continuous <Continuous>  Parenteral Nutrition - Adult 1 Each TPN Continuous <Continuous>    Genitourinary Medications    Hematologic/Oncologic Medications  enoxaparin Injectable 40 milliGRAM(s) SubCutaneous daily    Antimicrobial/Immunologic Medications  meropenem  IVPB 1000 milliGRAM(s) IV Intermittent every 8 hours  meropenem  IVPB      micafungin IVPB      micafungin IVPB 100 milliGRAM(s) IV Intermittent every 24 hours  vancomycin  IVPB 750 milliGRAM(s) IV Intermittent every 12 hours    Endocrine/Metabolic Medications  insulin lispro (HumaLOG) corrective regimen sliding scale   SubCutaneous every 4 hours    Topical/Other Medications  artificial  tears Solution 1 Drop(s) Both EYES every 12 hours  chlorhexidine 0.12% Liquid 15 milliLiter(s) Oral Mucosa two times a day  chlorhexidine 4% Liquid 1 Application(s) Topical <User Schedule>  petrolatum Ophthalmic Ointment 1 Application(s) Both EYES daily    Vital Signs Last 24 Hrs  T(C): 38.1 (28 Jan 2019 04:00), Max: 38.7 (27 Jan 2019 11:00)  T(F): 100.6 (28 Jan 2019 04:00), Max: 101.6 (27 Jan 2019 11:00)  HR: 76 (28 Jan 2019 06:59) (66 - 99)  BP: --  BP(mean): --  RR: 19 (28 Jan 2019 06:00) (10 - 37)  SpO2: 98% (28 Jan 2019 06:59) (95% - 100%)    I&O's Detail    27 Jan 2019 07:01  -  28 Jan 2019 07:00  --------------------------------------------------------  IN:    dexmedetomidine Infusion: 168.4 mL    fat emulsion (Fish Oil and Plant Based) 20% Infusion: 150 mL    IV PiggyBack: 1150 mL    TPN (Total Parenteral Nutrition): 1909 mL  Total IN: 3377.4 mL    OUT:    Bulb: 290 mL    Bulb: 370 mL    Indwelling Catheter - Urethral: 3400 mL    Nasoenteral Tube: 300 mL  Total OUT: 4360 mL    Total NET: -982.6 mL                                8.8    9.69  )-----------( 223      ( 28 Jan 2019 03:00 )             27.3       01-28    130<L>  |  96<L>  |  12  ----------------------------<  166<H>  3.4<L>   |  24  |  0.48<L>    Ca    8.0<L>      28 Jan 2019 03:00  Phos  2.1     01-28  Mg     1.8     01-28    TPro  5.8<L>  /  Alb  1.9<L>  /  TBili  3.8<H>  /  DBili  x   /  AST  112<H>  /  ALT  37<H>  /  AlkPhos  456<H>  01-28      CAPILLARY BLOOD GLUCOSE      POCT Blood Glucose.: 159 mg/dL (28 Jan 2019 05:02)  POCT Blood Glucose.: 181 mg/dL (28 Jan 2019 01:31)  POCT Blood Glucose.: 195 mg/dL (27 Jan 2019 22:24)  POCT Blood Glucose.: 192 mg/dL (27 Jan 2019 17:13)  POCT Blood Glucose.: 188 mg/dL (27 Jan 2019 14:11)  POCT Blood Glucose.: 178 mg/dL (27 Jan 2019 10:12)      LIVER FUNCTIONS - ( 28 Jan 2019 03:00 )  Alb: 1.9 g/dL / Pro: 5.8 g/dL / ALK PHOS: 456 u/L / ALT: 37 u/L / AST: 112 u/L / GGT: x                     EXAM  NEUROLOGY  Exam: Normal, NAD, alert, oriented x3, no focal deficits.     HEENT  Exam: Normocephalic, atraumatic    RESPIRATORY  Exam: Lungs clear to auscultation, Normal expansion/effort.   Mechanical Ventilation: Mode: CPAP with PS, FiO2: 30, PEEP: 5, PS: 13, MAP: 7.4    CARDIOVASCULAR  Exam: S1, S2.  Regular rate and rhythm.      GI/NUTRITION  Exam: Abdomen soft, mildly tender, Non-distended.   Wound:  c/d/i  Current Diet:  NPO    VASCULAR  Exam: Extremities warm, pink, well-perfused.     SKIN  Exam: Good skin turgor, no skin breakdown.     METABOLIC/FLUIDS/ELECTROLYTES  dextrose 5%. 1000 milliLiter(s) IV Continuous <Continuous>  fat emulsion (Fish Oil and Plant Based) 20% Infusion 12.5 mL/Hr IV Continuous <Continuous>  fat emulsion (Fish Oil and Plant Based) 20% Infusion 12.5 mL/Hr IV Continuous <Continuous>  Parenteral Nutrition - Adult 1 Each TPN Continuous <Continuous>      HEMATOLOGIC  [x] VTE Prophylaxis: enoxaparin Injectable 40 milliGRAM(s) SubCutaneous daily    Transfusions:	[] PRBC	[] Platelets		[] FFP	[] Cryoprecipitate    INFECTIOUS DISEASE  Antimicrobials/Immunologic Medications:  meropenem  IVPB 1000 milliGRAM(s) IV Intermittent every 8 hours  meropenem  IVPB      micafungin IVPB      micafungin IVPB 100 milliGRAM(s) IV Intermittent every 24 hours  vancomycin  IVPB 750 milliGRAM(s) IV Intermittent every 12 hours      Tubes/Lines/Drains   [x] Peripheral IV  [] Central Venous Line     	[] R	[] L	[] IJ	[] Fem	[] SC	Date Placed:   [x] Arterial Line		[] R	[] L	[] Fem	[] Rad	[] Ax	Date Placed:   [] PICC		[] Midline		[] Mediport  [] Urinary Catheter		Date Placed:   [x] Necessity of urinary, arterial, and venous catheters discussed

## 2019-01-28 NOTE — PROGRESS NOTE ADULT - SUBJECTIVE AND OBJECTIVE BOX
NUTRITION NOTE  [id number] SOUSA  ===============================    Interval events: Patient remains intubated on CPAP trials. Given Diamox for alkalosis and additional diuresis yesterday.      VITAL SIGNS:  T(C): 38.3 (19 @ 08:00), Max: 38.3 (19 @ 08:00)  HR: 70 (19 @ 11:00) (66 - 99)  BP: --  ABP: 93/44 (19 @ 11:00) (81/40 - 183/68)  ABP(mean): 62 (19 @ 11:00) (56 - 125)  RR: 15 (19 @ 11:00) (10 - 37)  SpO2: 98% (19 @ 11:00) (96% - 100%)  CVP(mm Hg): --   @ 07:01  -   @ 07:00  --------------------------------------------------------  IN: 3377.4 mL / OUT: 4360 mL / NET: -982.6 mL    Glucose 159-181    Neuro:  Remains sedated, intubated     CV: s1, s2 RRR    Pulm: Coarse b/s bilaterally     GI/Nutrition: moderately distended, JOURDAN drains with serous drainage, Dressing and abdominal binder in place    Extremity: Warm, edematous    PICC Site: C/D/I     Metabolic/FLUIDS/ELECTROLYTES/NUTRITION:  Gastrointestinal Medications:  dextrose 5%. 1000 milliLiter(s) IV Continuous <Continuous>  fat emulsion (Fish Oil and Plant Based) 20% Infusion 12.5 mL/Hr IV Continuous <Continuous>  fat emulsion (Fish Oil and Plant Based) 20% Infusion 12.5 mL/Hr IV Continuous <Continuous>  fat emulsion (Fish Oil and Plant Based) 20% Infusion 12.5 mL/Hr IV Continuous <Continuous>  Parenteral Nutrition - Adult 1 Each TPN Continuous <Continuous>  Parenteral Nutrition - Adult 1 Each TPN Continuous <Continuous>    I&O's Detail  54y  Daily Weight in k.8 (2019 05:00)      130<L>  |  96<L>  |  12  ----------------------------<  166<H>  3.4<L>   |  24  |  0.48<L>    Ca    8.0<L>      2019 03:00  Phos  2.1       Mg     1.8         TPro  5.8<L>  /  Alb  1.9<L>  /  TBili  3.8<H>  /  DBili  x   /  AST  112<H>  /  ALT  37<H>  /  AlkPhos  456<H>      Diet: TPN    INFECTIOUS DISEASE:  Antimicrobials/Immunologic Medications:  meropenem  IVPB 1000 milliGRAM(s) IV Intermittent every 8 hours  meropenem  IVPB      micafungin IVPB      micafungin IVPB 100 milliGRAM(s) IV Intermittent every 24 hours  vancomycin  IVPB 750 milliGRAM(s) IV Intermittent every 12 hours    RECENT CULTURES:    OTHER MEDICATIONS:  Endocrine/Metabolic Medications:  insulin lispro (HumaLOG) corrective regimen sliding scale   SubCutaneous every 4 hours    Genitourinary Medications:    Topical/Other Medications:  artificial  tears Solution 1 Drop(s) Both EYES every 12 hours  chlorhexidine 0.12% Liquid 15 milliLiter(s) Oral Mucosa two times a day  chlorhexidine 4% Liquid 1 Application(s) Topical <User Schedule>  petrolatum Ophthalmic Ointment 1 Application(s) Both EYES daily    ASSESSMENT/PLAN:  54y Female PMHx HTN, T2DM, asthma, depression with recent diagnosis of invasive ampullary adenocarcinoma of the CBD whom underwent an uneventful whipple 18. Patient has had an uptrending WBC since procedure, initially attributed to not restarting antibiotics for cholangitis, but experienced abdominal tenderness/distention and tachycardia yesterday and was transferred to SICU for close hemodynamic monitoring. Now s/p Celiotomy, evacuation of hematoma, cessation of gastroduodenal artery bleeding, and placement of Abthera vac . now s/p RTOR for abdominal washout     Continue TPN  Adjusted Insulin (Increased to 18Units), NaCl and potassium in TPN    1.  Protein calorie malnutrition being optimized with TPN: CHO [180 ] gm.  AA [70 ] gm. Lipids [30 ] gm.  2.  Hyperglycemia managed with: [18 ] units of regular insulin    3.  Check fluid balance daily.  Strict I/O  [ ] [ ]   4.  Daily BMP, Ionized Calcium, Magnesium and Phosphorous   5.  Triglycerides at initiation of TPN and monthly [ ] [ ]   6.  Pepcid in TPN for GI prophylaxis  [ ]     Nutrition Support [id number]

## 2019-01-28 NOTE — PROGRESS NOTE ADULT - ASSESSMENT
54y Female PMHx HTN, T2DM, asthma, depression with recent diagnosis of invasive ampullary adenocarcinoma of the CBD admitted for cholangitis on 1/5, s/p ERCP 1/8, whipple 1/11, rapid response for GIB on floor 1/18 s/p MTP, RTOR s/p celiotomy, evacuation of hematoma, GDA bleed stopped and sutured, Abthera vac. Closure of abdomen with strattice mesh on 1/21, with inna drains and left lower JOURDAN drain in subcutaneous. Incisional bleed 1/23, bedside skin staples removed, JOURDAN drain removed, hemorrhage controlled, for continuation of ICU care.     Neuro:  -Pain control with Dilaudid  -Precedex  -Versed PRN     Resp:  - will continue CPAP today and overnight  - daily ABG and CXR     CV:  - vitals q1h, ct flotrac and A line monitoring    GI:  - ct TPN through dedicated port  - daily electrolyte with ionized Ca as on TPN    Renal:  - Strict I&O, monitor lytes, replete as needed  - IVF on hold for now   -Bumex drip, Lasix 40, Albumin  -pm BMP reviewed, electrolyte repletion as needed    Endo:  - diabetic, FS q6h and low dose corrective regimen for hyperglycemia, needs better control   - TPN with insulin 10 U from today, ct to monitor     Heme:   -monitor H/H, stable   -ct lovenox vte ppx    ID:   - ct Meropenem/vancomycin for broadened coverage for now as pt continuously febrile and WBC elevated (Vanc was decreased as trough was high)- Will stop on 1/30  - UA positive, urine cx 1/21 candida, micafungin since 1/23, stop on 2/1  - intubated since 1/18  - Swanson to be continued for UOP monitoring in critically ill pt   - abdominal incision with external tissue expander device in place 1/24  -If she spikes a high fever send cultures and take central line out    Access: [facility] central line, R axillary arterial line    Dispo: SICU

## 2019-01-28 NOTE — PROGRESS NOTE ADULT - SUBJECTIVE AND OBJECTIVE BOX
General Surgery Progress Note    SUBJECTIVE:  The patient was seen and examined. No acute events overnight. Pain well controlled. Alert.    OBJECTIVE:     ** VITAL SIGNS / I&O's **    Vital Signs Last 24 Hrs  T(C): 38.1 (28 Jan 2019 04:00), Max: 38.7 (27 Jan 2019 11:00)  T(F): 100.6 (28 Jan 2019 04:00), Max: 101.6 (27 Jan 2019 11:00)  HR: 76 (28 Jan 2019 06:59) (66 - 99)  BP: --  BP(mean): --  RR: 19 (28 Jan 2019 06:00) (10 - 37)  SpO2: 98% (28 Jan 2019 06:59) (95% - 100%)  Mode: CPAP with PS  FiO2: 30  PEEP: 5  PS: 10  MAP: 7.3      27 Jan 2019 07:01  -  28 Jan 2019 07:00  --------------------------------------------------------  IN:    dexmedetomidine Infusion: 168.4 mL    fat emulsion (Fish Oil and Plant Based) 20% Infusion: 150 mL    IV PiggyBack: 1150 mL    TPN (Total Parenteral Nutrition): 1909 mL  Total IN: 3377.4 mL    OUT:    Bulb: 290 mL    Bulb: 370 mL    Indwelling Catheter - Urethral: 3400 mL    Nasoenteral Tube: 300 mL  Total OUT: 4360 mL    Total NET: -982.6 mL          ** PHYSICAL EXAM **    -- CONSTITUTIONAL: Alert, NAD  -- PULMONARY: non-labored respirations, breathing via vent  -- ABDOMEN: soft, ND, midline incision re-packed and covered, RLQ JOURDAN with bilious output, LLQ JOURDAN with dark/serous output, no rebound or guarding    ** LABS **                          8.8    9.69  )-----------( 223      ( 28 Jan 2019 03:00 )             27.3     28 Jan 2019 03:00    130    |  96     |  12     ----------------------------<  166    3.4     |  24     |  0.48     Ca    8.0        28 Jan 2019 03:00  Phos  2.1       28 Jan 2019 03:00  Mg     1.8       28 Jan 2019 03:00    TPro  5.8    /  Alb  1.9    /  TBili  3.8    /  DBili  x      /  AST  112    /  ALT  37     /  AlkPhos  456    28 Jan 2019 03:00      CAPILLARY BLOOD GLUCOSE      POCT Blood Glucose.: 159 mg/dL (28 Jan 2019 05:02)  POCT Blood Glucose.: 181 mg/dL (28 Jan 2019 01:31)  POCT Blood Glucose.: 195 mg/dL (27 Jan 2019 22:24)  POCT Blood Glucose.: 192 mg/dL (27 Jan 2019 17:13)  POCT Blood Glucose.: 188 mg/dL (27 Jan 2019 14:11)  POCT Blood Glucose.: 178 mg/dL (27 Jan 2019 10:12)        LIVER FUNCTIONS - ( 28 Jan 2019 03:00 )  Alb: 1.9 g/dL / Pro: 5.8 g/dL / ALK PHOS: 456 u/L / ALT: 37 u/L / AST: 112 u/L / GGT: x                 MEDICATIONS  (STANDING):  artificial  tears Solution 1 Drop(s) Both EYES every 12 hours  chlorhexidine 0.12% Liquid 15 milliLiter(s) Oral Mucosa two times a day  chlorhexidine 4% Liquid 1 Application(s) Topical <User Schedule>  dexmedetomidine Infusion 0.5 MICROgram(s)/kG/Hr (7.175 mL/Hr) IV Continuous <Continuous>  dextrose 5%. 1000 milliLiter(s) (50 mL/Hr) IV Continuous <Continuous>  enoxaparin Injectable 40 milliGRAM(s) SubCutaneous daily  fat emulsion (Fish Oil and Plant Based) 20% Infusion 12.5 mL/Hr (12.5 mL/Hr) IV Continuous <Continuous>  fat emulsion (Fish Oil and Plant Based) 20% Infusion 12.5 mL/Hr (12.5 mL/Hr) IV Continuous <Continuous>  insulin lispro (HumaLOG) corrective regimen sliding scale   SubCutaneous every 4 hours  meropenem  IVPB 1000 milliGRAM(s) IV Intermittent every 8 hours  meropenem  IVPB      micafungin IVPB      micafungin IVPB 100 milliGRAM(s) IV Intermittent every 24 hours  Parenteral Nutrition - Adult 1 Each (83 mL/Hr) TPN Continuous <Continuous>  petrolatum Ophthalmic Ointment 1 Application(s) Both EYES daily  vancomycin  IVPB 750 milliGRAM(s) IV Intermittent every 12 hours    MEDICATIONS  (PRN):

## 2019-01-28 NOTE — PROGRESS NOTE ADULT - SUBJECTIVE AND OBJECTIVE BOX
Plastic Surgery Progress Note     S: Patient seen and examined today. No acute events overnight.     MEDICATIONS  (STANDING):  artificial  tears Solution 1 Drop(s) Both EYES every 12 hours  chlorhexidine 0.12% Liquid 15 milliLiter(s) Oral Mucosa two times a day  chlorhexidine 4% Liquid 1 Application(s) Topical <User Schedule>  dexmedetomidine Infusion 0.5 MICROgram(s)/kG/Hr (7.175 mL/Hr) IV Continuous <Continuous>  dextrose 5%. 1000 milliLiter(s) (50 mL/Hr) IV Continuous <Continuous>  enoxaparin Injectable 40 milliGRAM(s) SubCutaneous daily  fat emulsion (Fish Oil and Plant Based) 20% Infusion 12.5 mL/Hr (12.5 mL/Hr) IV Continuous <Continuous>  fat emulsion (Fish Oil and Plant Based) 20% Infusion 12.5 mL/Hr (12.5 mL/Hr) IV Continuous <Continuous>  insulin lispro (HumaLOG) corrective regimen sliding scale   SubCutaneous every 4 hours  meropenem  IVPB 1000 milliGRAM(s) IV Intermittent every 8 hours  meropenem  IVPB      micafungin IVPB      micafungin IVPB 100 milliGRAM(s) IV Intermittent every 24 hours  Parenteral Nutrition - Adult 1 Each (83 mL/Hr) TPN Continuous <Continuous>  petrolatum Ophthalmic Ointment 1 Application(s) Both EYES daily  vancomycin  IVPB 750 milliGRAM(s) IV Intermittent every 12 hours    MEDICATIONS  (PRN):      Physical Exam:    Vital Signs Last 24 Hrs  T(C): 38.1 (28 Jan 2019 04:00), Max: 38.7 (27 Jan 2019 11:00)  T(F): 100.6 (28 Jan 2019 04:00), Max: 101.6 (27 Jan 2019 11:00)  HR: 76 (28 Jan 2019 06:59) (66 - 99)  BP: --  BP(mean): --  RR: 19 (28 Jan 2019 06:00) (10 - 37)  SpO2: 98% (28 Jan 2019 06:59) (95% - 100%)    01-27-19 @ 07:01  -  01-28-19 @ 07:00  --------------------------------------------------------  IN: 3377.4 mL / OUT: 4360 mL / NET: -982.6 mL      -- CONSTITUTIONAL: Sedated.   -- HEENT: Intubated.  -- CARDIOVASCULAR: Regular rate and rhythm. S1, S2.  -- RESPIRATORY: Bilateral breath sounds.   -- ABDOMEN: Open abdominal wound with exposed Strattice mesh. Small amount of granulation tissue. No evidence of drainage or malodor. External tissue expander device in place. Wound coming together            LABS:                        8.8    9.69  )-----------( 223      ( 28 Jan 2019 03:00 )             27.3     01-28    130<L>  |  96<L>  |  12  ----------------------------<  166<H>  3.4<L>   |  24  |  0.48<L>    Ca    8.0<L>      28 Jan 2019 03:00  Phos  2.1     01-28  Mg     1.8     01-28    TPro  5.8<L>  /  Alb  1.9<L>  /  TBili  3.8<H>  /  DBili  x   /  AST  112<H>  /  ALT  37<H>  /  AlkPhos  456<H>  01-28

## 2019-01-28 NOTE — PROGRESS NOTE ADULT - ASSESSMENT
54y Female PMHx HTN, T2DM, asthma, depression with recent diagnosis of invasive ampullary adenocarcinoma of the CBD whom underwent an uneventful whipple 1/11/18. Patient has had an uptrending WBC since procedure, initially attributed to not restarting antibiotics for cholangitis, but experienced abdominal tenderness/distention and tachycardia yesterday and was transferred to SICU for close hemodynamic monitoring. Now s/p Celiotomy, evacuation of hematoma, cessation of gastroduodenal artery bleeding, and placement of Abthera vac 1/18. now s/p RTOR for abdominal washout 1/21.    PLAN:  -continue to appreciate SICU care  -Pain control as needed  -Monitor vs, uop  -Monitor abd incision and closure device  -Serial CBCs, H/H  -cont meropenem, vanc, micafungin  -npo/ivf/TPN  -dvt ppx: 40mg lovenox subq 54y Female PMHx HTN, T2DM, asthma, depression with recent diagnosis of invasive ampullary adenocarcinoma of the CBD whom underwent an uneventful whipple 1/11/18. Patient has had an uptrending WBC since procedure, initially attributed to not restarting antibiotics for cholangitis, but experienced abdominal tenderness/distention and tachycardia yesterday and was transferred to SICU for close hemodynamic monitoring. Now s/p Celiotomy, evacuation of hematoma, cessation of gastroduodenal artery bleeding, and placement of Abthera vac 1/18. now s/p RTOR for abdominal washout 1/21.    PLAN:  -continue to appreciate SICU care  -Pain control as needed  -Monitor vs, uop  -Monitor abd incision and closure device  -Monitor extubation status  -Serial CBCs, H/H  -cont meropenem, vanc, micafungin  -npo/ivf/TPN  -dvt ppx: 40mg lovenox subq 54y Female PMHx HTN, T2DM, asthma, depression with recent diagnosis of invasive ampullary adenocarcinoma of the CBD whom underwent an uneventful whipple 1/11/18. Patient has had an uptrending WBC since procedure, initially attributed to not restarting antibiotics for cholangitis, but experienced abdominal tenderness/distention and tachycardia  and was transferred to SICU for close hemodynamic monitoring. Now s/p Celiotomy, evacuation of hematoma, cessation of gastroduodenal artery bleeding, and placement of Abthera vac 1/18. now s/p RTOR for abdominal washout 1/21.    PLAN:  -continue to appreciate SICU care  -Pain control as needed  -Monitor vs, uop  -Monitor abd incision and closure device  -Monitor extubation status  -Serial CBCs, H/H  -cont meropenem, vanc, micafungin  -npo/ivf/TPN  -dvt ppx: 40mg lovenox subq

## 2019-01-28 NOTE — PROGRESS NOTE ADULT - ATTENDING COMMENTS
54y Female PMHx HTN, T2DM, asthma, depression with recent diagnosis of invasive ampullary adenocarcinoma of the CBD admitted for cholangitis on 1/5, s/p ERCP 1/8, whipple 1/11, rapid response for GIB on floor 1/18 s/p MTP, RTOR s/p celiotomy, evacuation of hematoma, GDA bleed stopped and sutured, Abthera vac. Closure of abdomen with strattice mesh on 1/21, with inna drains and left lower JOURDAN drain in subcutaneous. Incisional bleed 1/23, bedside skin staples removed, JOURDAN drain removed, hemorrhage controlled, for continuation of ICU care.     Neuro:  -Pain control with Dilaudid  -Precedex was increased  -Versed PRN     Resp:  - CPAP trial as tolerated currently on 10/5 will try 5/5, plan to extubate  - daily CXR and ABG     CV:  - vitals q1h, ct flotrac and A line monitoring    GI:  - ct TPN through dedicated port  - daily electrolyte with ionized Ca as on TPN    Renal:  - Strict I&O, monitor lytes, replete as needed  - IVF on hold for now   -Bumex drip, Lasix 40, Albumin 50 (25%)  -Check labs in PM    Endo:  - diabetic, FS q6h and low dose corrective regimen for hyperglycemia, needs better control   - TPN with insulin 10 U from today, ct to monitor     Heme:   -monitor H/H, stable   -ct lovenox vte ppx    ID:   - ct Meropenem/vancomycin for broadened coverage for now as pt continuously febrile and WBC elevated (Vanc was decreased as trough was high)- Will stop on 1/30  - UA positive, urine cx 1/21 candida, micafungin since 1/23, stop on 2/1  - intubated since 1/18  - Swanson to be continued for UOP monitoring in critically ill pt   - abdominal incision with external tissue expander device in place 1/24  -If she spikes a high fever send cultures and take central line out    Access: [facility] central line, 2 PIV, R axillary arterial line    Dispo: SICU  The patient is a critical care patient with life threatening hemodynamic and metabolic instability in SICU.  I have personally interviewed when possible and examined the patient, reviewed data and laboratory tests/x-rays and all pertinent electronic images.  I was physically present for the key portions of the evaluation and management (E/M) service provided.   The SICU team has a constant risk benefit analyzes discussion with the primary team, all consultants, House Staff and PA's on all decisions.  These diagnoses are unrelated to the surgical procedure noted above.  I meet with family if needed to get further history, discuss the case and make care decisions for this patient who might not be able to participate.  Time involved in performance of separately billable procedures was not counted toward my critical care time. There is no overlap.  I spent 55-75 minutes of critical care time for the diagnoses, assessment, plan and interventions.

## 2019-01-29 LAB
ALBUMIN SERPL ELPH-MCNC: 2 G/DL — LOW (ref 3.3–5)
ALP SERPL-CCNC: 792 U/L — HIGH (ref 40–120)
ALT FLD-CCNC: 41 U/L — HIGH (ref 4–33)
ANION GAP SERPL CALC-SCNC: 12 MMO/L — SIGNIFICANT CHANGE UP (ref 7–14)
AST SERPL-CCNC: 119 U/L — HIGH (ref 4–32)
BASE EXCESS BLDA CALC-SCNC: 3.5 MMOL/L — SIGNIFICANT CHANGE UP
BASE EXCESS BLDA CALC-SCNC: 4.8 MMOL/L — SIGNIFICANT CHANGE UP
BILIRUB DIRECT SERPL-MCNC: 2.5 MG/DL — HIGH (ref 0.1–0.2)
BILIRUB SERPL-MCNC: 3.5 MG/DL — HIGH (ref 0.2–1.2)
BUN SERPL-MCNC: 17 MG/DL — SIGNIFICANT CHANGE UP (ref 7–23)
CA-I BLD-SCNC: 1.04 MMOL/L — SIGNIFICANT CHANGE UP (ref 1.03–1.23)
CA-I BLDA-SCNC: 1.17 MMOL/L — SIGNIFICANT CHANGE UP (ref 1.15–1.29)
CALCIUM SERPL-MCNC: 8 MG/DL — LOW (ref 8.4–10.5)
CHLORIDE BLDA-SCNC: 100 MMOL/L — SIGNIFICANT CHANGE UP (ref 96–108)
CHLORIDE SERPL-SCNC: 94 MMOL/L — LOW (ref 98–107)
CO2 SERPL-SCNC: 25 MMOL/L — SIGNIFICANT CHANGE UP (ref 22–31)
CREAT SERPL-MCNC: 0.61 MG/DL — SIGNIFICANT CHANGE UP (ref 0.5–1.3)
GLUCOSE BLDA-MCNC: 178 MG/DL — HIGH (ref 70–99)
GLUCOSE BLDA-MCNC: 182 MG/DL — HIGH (ref 70–99)
GLUCOSE SERPL-MCNC: 182 MG/DL — HIGH (ref 70–99)
HCO3 BLDA-SCNC: 28 MMOL/L — HIGH (ref 22–26)
HCO3 BLDA-SCNC: 29 MMOL/L — HIGH (ref 22–26)
HCT VFR BLD CALC: 24.5 % — LOW (ref 34.5–45)
HCT VFR BLDA CALC: 25.9 % — LOW (ref 34.5–46.5)
HCT VFR BLDA CALC: 32.4 % — LOW (ref 34.5–46.5)
HGB BLD-MCNC: 8.1 G/DL — LOW (ref 11.5–15.5)
HGB BLDA-MCNC: 10.5 G/DL — LOW (ref 11.5–15.5)
HGB BLDA-MCNC: 8.3 G/DL — LOW (ref 11.5–15.5)
LACTATE BLDA-SCNC: 1.4 MMOL/L — SIGNIFICANT CHANGE UP (ref 0.5–2)
LACTATE BLDA-SCNC: 1.7 MMOL/L — SIGNIFICANT CHANGE UP (ref 0.5–2)
MAGNESIUM SERPL-MCNC: 2.1 MG/DL — SIGNIFICANT CHANGE UP (ref 1.6–2.6)
MCHC RBC-ENTMCNC: 28 PG — SIGNIFICANT CHANGE UP (ref 27–34)
MCHC RBC-ENTMCNC: 33.1 % — SIGNIFICANT CHANGE UP (ref 32–36)
MCV RBC AUTO: 84.8 FL — SIGNIFICANT CHANGE UP (ref 80–100)
NRBC # FLD: 0 K/UL — LOW (ref 25–125)
PCO2 BLDA: 34 MMHG — SIGNIFICANT CHANGE UP (ref 32–48)
PCO2 BLDA: 35 MMHG — SIGNIFICANT CHANGE UP (ref 32–48)
PH BLDA: 7.51 PH — HIGH (ref 7.35–7.45)
PH BLDA: 7.51 PH — HIGH (ref 7.35–7.45)
PHOSPHATE SERPL-MCNC: 2.9 MG/DL — SIGNIFICANT CHANGE UP (ref 2.5–4.5)
PLATELET # BLD AUTO: 258 K/UL — SIGNIFICANT CHANGE UP (ref 150–400)
PMV BLD: 10.9 FL — SIGNIFICANT CHANGE UP (ref 7–13)
PO2 BLDA: 77 MMHG — LOW (ref 83–108)
PO2 BLDA: 90 MMHG — SIGNIFICANT CHANGE UP (ref 83–108)
POTASSIUM BLDA-SCNC: 3 MMOL/L — LOW (ref 3.4–4.5)
POTASSIUM BLDA-SCNC: 3.2 MMOL/L — LOW (ref 3.4–4.5)
POTASSIUM SERPL-MCNC: 3.4 MMOL/L — LOW (ref 3.5–5.3)
POTASSIUM SERPL-SCNC: 3.4 MMOL/L — LOW (ref 3.5–5.3)
PROT SERPL-MCNC: 5.9 G/DL — LOW (ref 6–8.3)
RBC # BLD: 2.89 M/UL — LOW (ref 3.8–5.2)
RBC # FLD: 17 % — HIGH (ref 10.3–14.5)
SAO2 % BLDA: 96.2 % — SIGNIFICANT CHANGE UP (ref 95–99)
SAO2 % BLDA: 97.6 % — SIGNIFICANT CHANGE UP (ref 95–99)
SODIUM BLDA-SCNC: 127 MMOL/L — LOW (ref 136–146)
SODIUM BLDA-SCNC: 129 MMOL/L — LOW (ref 136–146)
SODIUM SERPL-SCNC: 131 MMOL/L — LOW (ref 135–145)
WBC # BLD: 10.63 K/UL — HIGH (ref 3.8–10.5)
WBC # FLD AUTO: 10.63 K/UL — HIGH (ref 3.8–10.5)

## 2019-01-29 PROCEDURE — 76604 US EXAM CHEST: CPT | Mod: 26

## 2019-01-29 PROCEDURE — 71045 X-RAY EXAM CHEST 1 VIEW: CPT | Mod: 26

## 2019-01-29 PROCEDURE — 99291 CRITICAL CARE FIRST HOUR: CPT

## 2019-01-29 RX ORDER — INSULIN LISPRO 100/ML
VIAL (ML) SUBCUTANEOUS EVERY 6 HOURS
Qty: 0 | Refills: 0 | Status: DISCONTINUED | OUTPATIENT
Start: 2019-01-29 | End: 2019-01-31

## 2019-01-29 RX ORDER — SODIUM CHLORIDE 9 MG/ML
1000 INJECTION, SOLUTION INTRAVENOUS
Qty: 0 | Refills: 0 | Status: DISCONTINUED | OUTPATIENT
Start: 2019-01-29 | End: 2019-01-29

## 2019-01-29 RX ORDER — GLUCAGON INJECTION, SOLUTION 0.5 MG/.1ML
1 INJECTION, SOLUTION SUBCUTANEOUS ONCE
Qty: 0 | Refills: 0 | Status: DISCONTINUED | OUTPATIENT
Start: 2019-01-29 | End: 2019-01-29

## 2019-01-29 RX ORDER — DEXTROSE 50 % IN WATER 50 %
12.5 SYRINGE (ML) INTRAVENOUS ONCE
Qty: 0 | Refills: 0 | Status: DISCONTINUED | OUTPATIENT
Start: 2019-01-29 | End: 2019-01-29

## 2019-01-29 RX ORDER — ACETAZOLAMIDE 250 MG/1
250 TABLET ORAL ONCE
Qty: 0 | Refills: 0 | Status: COMPLETED | OUTPATIENT
Start: 2019-01-29 | End: 2019-01-29

## 2019-01-29 RX ORDER — DEXTROSE 50 % IN WATER 50 %
15 SYRINGE (ML) INTRAVENOUS ONCE
Qty: 0 | Refills: 0 | Status: DISCONTINUED | OUTPATIENT
Start: 2019-01-29 | End: 2019-01-29

## 2019-01-29 RX ORDER — MORPHINE SULFATE 50 MG/1
2 CAPSULE, EXTENDED RELEASE ORAL ONCE
Qty: 0 | Refills: 0 | Status: DISCONTINUED | OUTPATIENT
Start: 2019-01-29 | End: 2019-01-29

## 2019-01-29 RX ORDER — DEXTROSE 50 % IN WATER 50 %
25 SYRINGE (ML) INTRAVENOUS ONCE
Qty: 0 | Refills: 0 | Status: DISCONTINUED | OUTPATIENT
Start: 2019-01-29 | End: 2019-01-29

## 2019-01-29 RX ORDER — ELECTROLYTE SOLUTION,INJ
1 VIAL (ML) INTRAVENOUS
Qty: 0 | Refills: 0 | Status: DISCONTINUED | OUTPATIENT
Start: 2019-01-29 | End: 2019-01-30

## 2019-01-29 RX ORDER — ACETAMINOPHEN 500 MG
1000 TABLET ORAL ONCE
Qty: 0 | Refills: 0 | Status: COMPLETED | OUTPATIENT
Start: 2019-01-29 | End: 2019-01-29

## 2019-01-29 RX ORDER — INSULIN LISPRO 100/ML
VIAL (ML) SUBCUTANEOUS
Qty: 0 | Refills: 0 | Status: DISCONTINUED | OUTPATIENT
Start: 2019-01-29 | End: 2019-01-29

## 2019-01-29 RX ORDER — I.V. FAT EMULSION 20 G/100ML
12.5 EMULSION INTRAVENOUS
Qty: 30 | Refills: 0 | Status: DISCONTINUED | OUTPATIENT
Start: 2019-01-29 | End: 2019-01-30

## 2019-01-29 RX ADMIN — Medication 1000 MILLIGRAM(S): at 01:15

## 2019-01-29 RX ADMIN — Medication 1 DROP(S): at 18:10

## 2019-01-29 RX ADMIN — ENOXAPARIN SODIUM 40 MILLIGRAM(S): 100 INJECTION SUBCUTANEOUS at 11:45

## 2019-01-29 RX ADMIN — Medication 2: at 03:07

## 2019-01-29 RX ADMIN — MICAFUNGIN SODIUM 105 MILLIGRAM(S): 100 INJECTION, POWDER, LYOPHILIZED, FOR SOLUTION INTRAVENOUS at 20:50

## 2019-01-29 RX ADMIN — Medication 1: at 23:01

## 2019-01-29 RX ADMIN — Medication 1 APPLICATION(S): at 11:44

## 2019-01-29 RX ADMIN — I.V. FAT EMULSION 12.5 ML/HR: 20 EMULSION INTRAVENOUS at 16:17

## 2019-01-29 RX ADMIN — Medication 2: at 15:04

## 2019-01-29 RX ADMIN — MORPHINE SULFATE 2 MILLIGRAM(S): 50 CAPSULE, EXTENDED RELEASE ORAL at 05:59

## 2019-01-29 RX ADMIN — I.V. FAT EMULSION 12.5 ML/HR: 20 EMULSION INTRAVENOUS at 20:52

## 2019-01-29 RX ADMIN — Medication 1 EACH: at 20:52

## 2019-01-29 RX ADMIN — MEROPENEM 100 MILLIGRAM(S): 1 INJECTION INTRAVENOUS at 23:01

## 2019-01-29 RX ADMIN — ACETAZOLAMIDE 250 MILLIGRAM(S): 250 TABLET ORAL at 08:51

## 2019-01-29 RX ADMIN — Medication 400 MILLIGRAM(S): at 16:17

## 2019-01-29 RX ADMIN — Medication 250 MILLIGRAM(S): at 11:45

## 2019-01-29 RX ADMIN — Medication 1 EACH: at 17:01

## 2019-01-29 RX ADMIN — Medication 400 MILLIGRAM(S): at 00:45

## 2019-01-29 RX ADMIN — Medication 10 MILLIGRAM(S): at 15:04

## 2019-01-29 RX ADMIN — MEROPENEM 100 MILLIGRAM(S): 1 INJECTION INTRAVENOUS at 15:03

## 2019-01-29 RX ADMIN — Medication 1 DROP(S): at 05:50

## 2019-01-29 RX ADMIN — MORPHINE SULFATE 2 MILLIGRAM(S): 50 CAPSULE, EXTENDED RELEASE ORAL at 06:14

## 2019-01-29 RX ADMIN — CHLORHEXIDINE GLUCONATE 15 MILLILITER(S): 213 SOLUTION TOPICAL at 05:51

## 2019-01-29 RX ADMIN — Medication 1000 MILLIGRAM(S): at 16:30

## 2019-01-29 RX ADMIN — Medication 2: at 11:45

## 2019-01-29 RX ADMIN — Medication 2: at 05:50

## 2019-01-29 RX ADMIN — MEROPENEM 100 MILLIGRAM(S): 1 INJECTION INTRAVENOUS at 05:51

## 2019-01-29 NOTE — PROGRESS NOTE ADULT - SUBJECTIVE AND OBJECTIVE BOX
Surgery Progress Note    S: Patient seen and examined. Patient was febrile o/n to 39.6. Yesterday, patient was on bumex gtt for diuresis with good urinary response. This AM, patient is only on precedex=0.7.     O:  Vital Signs Last 24 Hrs  T(C): 37.2 (29 Jan 2019 04:00), Max: 39.6 (29 Jan 2019 00:00)  T(F): 98.9 (29 Jan 2019 04:00), Max: 103.3 (29 Jan 2019 00:00)  HR: 62 (29 Jan 2019 06:47) (57 - 85)  BP: --  BP(mean): --  RR: 23 (29 Jan 2019 06:00) (15 - 25)  SpO2: 99% (29 Jan 2019 06:47) (96% - 100%)    I&O's Detail    27 Jan 2019 07:01  -  28 Jan 2019 07:00  --------------------------------------------------------  IN:    dexmedetomidine Infusion: 168.4 mL    fat emulsion (Fish Oil and Plant Based) 20% Infusion: 150 mL    IV PiggyBack: 1150 mL    TPN (Total Parenteral Nutrition): 1909 mL  Total IN: 3377.4 mL    OUT:    Bulb: 370 mL    Bulb: 290 mL    Indwelling Catheter - Urethral: 3400 mL    Nasoenteral Tube: 300 mL  Total OUT: 4360 mL    Total NET: -982.6 mL      28 Jan 2019 07:01  -  29 Jan 2019 06:49  --------------------------------------------------------  IN:    bumetanide Infusion: 37.5 mL    dexmedetomidine Infusion: 195.8 mL    fat emulsion (Fish Oil and Plant Based) 20% Infusion: 150 mL    IV PiggyBack: 1150 mL    TPN (Total Parenteral Nutrition): 1909 mL  Total IN: 3442.3 mL    OUT:    Bulb: 70 mL    Bulb: 115 mL    Indwelling Catheter - Urethral: 04716 mL    Nasoenteral Tube: 250 mL  Total OUT: 43889 mL    Total NET: -7842.7 mL          MEDICATIONS  (STANDING):  artificial  tears Solution 1 Drop(s) Both EYES every 12 hours  chlorhexidine 0.12% Liquid 15 milliLiter(s) Oral Mucosa two times a day  chlorhexidine 4% Liquid 1 Application(s) Topical <User Schedule>  dexmedetomidine Infusion 0.5 MICROgram(s)/kG/Hr (7.175 mL/Hr) IV Continuous <Continuous>  enoxaparin Injectable 40 milliGRAM(s) SubCutaneous daily  fat emulsion (Fish Oil and Plant Based) 20% Infusion 12.5 mL/Hr (12.5 mL/Hr) IV Continuous <Continuous>  fat emulsion (Fish Oil and Plant Based) 20% Infusion 12.5 mL/Hr (12.5 mL/Hr) IV Continuous <Continuous>  insulin lispro (HumaLOG) corrective regimen sliding scale   SubCutaneous every 4 hours  meropenem  IVPB 1000 milliGRAM(s) IV Intermittent every 8 hours  meropenem  IVPB      micafungin IVPB      micafungin IVPB 100 milliGRAM(s) IV Intermittent every 24 hours  Parenteral Nutrition - Adult 1 Each (83 mL/Hr) TPN Continuous <Continuous>  petrolatum Ophthalmic Ointment 1 Application(s) Both EYES daily  vancomycin  IVPB 1000 milliGRAM(s) IV Intermittent daily    MEDICATIONS  (PRN):  midazolam Injectable 2 milliGRAM(s) IV Push every 4 hours PRN agitation                            8.1    10.63 )-----------( 258      ( 29 Jan 2019 02:30 )             24.5       01-29    131<L>  |  94<L>  |  17  ----------------------------<  182<H>  3.4<L>   |  25  |  0.61    Ca    8.0<L>      29 Jan 2019 02:30  Phos  2.9     01-29  Mg     2.1     01-29    TPro  5.9<L>  /  Alb  2.0<L>  /  TBili  3.5<H>  /  DBili  2.5<H>  /  AST  119<H>  /  ALT  41<H>  /  AlkPhos  792<H>  01-29      Physical Exam:  Gen: Laying in bed, NAD, NGT bilious, intubated, awake  Resp: Unlabored breathing via ventilator  Abd: soft, ND, midline incision re-packed, LLQ and RUQ JOURDAN bilious, no rebound or guarding  Ext: WWP  Skin: No rashes  : sinha in place draining yellow/clear urine

## 2019-01-29 NOTE — PROGRESS NOTE ADULT - ATTENDING COMMENTS
54y Female PMHx HTN, T2DM, asthma, depression with recent diagnosis of invasive ampullary adenocarcinoma of the CBD admitted for cholangitis on 1/5, s/p ERCP 1/8, whipple 1/11, rapid response for GIB on floor 1/18 s/p MTP, RTOR s/p celiotomy, evacuation of hematoma, GDA bleed stopped and sutured, Abthera vac. Closure of abdomen with strattice mesh on 1/21, with inna drains and left lower JOURDAN drain in subcutaneous. Incisional bleed 1/23, bedside skin staples removed, JOURDAN drain removed, hemorrhage controlled, for continuation of ICU care.     Neuro:  -Pain control with Dilaudid  -Decrease Precedex to prepare for extubation  -Versed PRN     Resp:  -Trial CPAP 21%FiO2 5/5  -Repeat ABG  -U/S of lungs to check for Curly-B lines  -daily ABG and CXR  -Leak test from 50-80%  -Will attempt Extubation today as RSBI score is 60    CV:  - vitals q1h, ct flotrac and A line monitoring    GI:  - ct TPN through dedicated port  - daily electrolyte with ionized Ca as on TPN  -Suppository to have a bowel movement  -Change NGT to KO Tube    Renal:  - Strict I&O, monitor lytes, replete as needed  - IVF on hold for now   -Net negative 10L yesterday   -Diamox x1 dose today     Endo:  - diabetic, FS q6h and low dose corrective regimen for hyperglycemia, needs better control   - TPN with insulin     Heme:   -monitor H/H, stable   -ct lovenox vte ppx    ID:   - ct Meropenem/vancomycin for broadened coverage for now as pt continuously febrile and WBC elevated (Vanc was decreased as trough was high)- Will stop on 1/30   - UA positive, urine cx 1/21 candida, micafungin since 1/23, stop on 2/1  - intubated since 1/18  - Swanson to be continued for UOP monitoring in critically ill pt   - abdominal incision with external tissue expander device in place 1/24  - No need for CT at this time.    Access: [facility] central line, R axillary arterial line    Dispo: SICU    CC Diagnosis: Respiratory distress, fluid overload, malnutrition  The patient is a critical care patient with life threatening hemodynamic and metabolic instability in SICU.  I have personally interviewed when possible and examined the patient, reviewed data and laboratory tests/x-rays and all pertinent electronic images.  I was physically present for the key portions of the evaluation and management (E/M) service provided.   The SICU team has a constant risk benefit analyzes discussion with the primary team, all consultants, House Staff and PA's on all decisions.  These diagnoses are unrelated to the surgical procedure noted above.  I meet with family if needed to get further history, discuss the case and make care decisions for this patient who might not be able to participate.  Time involved in performance of separately billable procedures was not counted toward my critical care time. There is no overlap.  I spent 55-75 minutes of critical care time for the diagnoses, assessment, plan and interventions.

## 2019-01-29 NOTE — PROGRESS NOTE ADULT - ATTENDING COMMENTS
54y Female PMHx HTN, T2DM, asthma, depression with recent diagnosis of invasive ampullary adenocarcinoma of the CBD whom underwent an uneventful whipple 1/11/18. Patient has had an uptrending WBC since procedure, initially attributed to not restarting antibiotics for cholangitis, but experienced abdominal tenderness/distention and tachycardia yesterday and was transferred to SICU for close hemodynamic monitoring. Now s/p Celiotomy, evacuation of hematoma, cessation of gastroduodenal artery bleeding, and placement of Abthera vac 1/18. now s/p RTOR for abdominal washout 1/21    Continue TPN  Repleted lytes (NaCl, Potassium and Magnesium)  Increased Insulin to 23 Units      1.  Protein calorie malnutrition being optimized with TPN: CHO [180 ] gm.  AA [70 ] gm. Lipids [30 ] gm.  2.  Hyperglycemia managed with: [23 ] units of regular insulin    3.  Check fluid balance daily.  Strict I/O  [ ] [ ]   4.  Daily BMP, Ionized Calcium, Magnesium and Phosphorous   5.  Triglycerides at initiation of TPN and monthly [ ] [ ]   6.  Pepcid in TPN for GI  prophylaxis  [ ]   I have seen and examined the patient, reviewed the laboratory and radiologic data and agree with the history, physical assessment and plan.  I reviewed and discussed with all consultants, house staff and PA's.  The note is edited where appropriate. The Nutrition Support Team (NST) discusses on an ongoing basis with the primary team and all consultants, House staff and PA's to have a permanent risk benefit analyses on all decisions.    I spent  45  minutes in total encounter; more than 50% of the visit counseling and coordinating nutrition care while providing diagnoses, assessment, and plan.

## 2019-01-29 NOTE — PROGRESS NOTE ADULT - ATTENDING COMMENTS
54y Female PMHx HTN, T2DM, asthma, depression with recent diagnosis of invasive ampullary adenocarcinoma of the CBD admitted for cholangitis on 1/5, s/p ERCP 1/8, whipple 1/11, rapid response for GIB on floor 1/18 s/p MTP, RTOR s/p celiotomy, evacuation of hematoma, GDA bleed stopped and sutured, Abthera vac. Closure of abdomen with strattice mesh on 1/21, with inna drains and left lower JOURDAN drain in subcutaneous. Incisional bleed 1/23, bedside skin staples removed, JOURDAN drain removed, hemorrhage controlled, for continuation of ICU care.     Neuro:  -IV tylenol PRN for pain  -Versed PRN     Resp:  -Extubated  -Leak test from 50-80%, RSBI >60, successfully extubated    CV:  - vitals q1h, A line monitoring    GI:  - ct TPN through dedicated port  - daily electrolyte with ionized Ca as on TPN  - Bowel regimen    Renal:  - Strict I&O, monitor lytes, replete as needed  - IVF on hold for now   -Net negative 10L yesterday, bumex/lasix being held today  -Diamox x1 dose today     Endo:  - diabetic, FS q6h and low dose corrective regimen for hyperglycemia  - TPN with insulin     Heme:   -monitor H/H, stable   -ct lovenox vte ppx    ID:   - Vanc/Jose Carlos since 1/19       - Vanc trough prior to 3rd dose  - Micafungin since 1/23    - UA positive, urine cx 1/21 candida, micafungin since 1/23, stop on 2/1  - intubated since 1/18  - Swanson to be continued for UOP monitoring in critically ill pt   - abdominal incision with external tissue expander device in place 1/24    Access: [facility] central line, R axillary arterial line    Dispo: SICU    CC Diagnosis: Respiratory distress, fluid overload, malnutrition  The patient is a critical care patient with life threatening hemodynamic and metabolic instability in SICU.  I have personally interviewed when possible and examined the patient, reviewed data and laboratory tests/x-rays and all pertinent electronic images.  I was physically present for the key portions of the evaluation and management (E/M) service provided.   The SICU team has a constant risk benefit analyzes discussion with the primary team, all consultants, House Staff and PA's on all decisions.  These diagnoses are unrelated to the surgical procedure noted above.  I meet with family if needed to get further history, discuss the case and make care decisions for this patient who might not be able to participate.  Time involved in performance of separately billable procedures was not counted toward my critical care time. There is no overlap.  I spent 55-75 minutes of critical care time for the diagnoses, assessment, plan and interventions.

## 2019-01-29 NOTE — PROGRESS NOTE ADULT - SUBJECTIVE AND OBJECTIVE BOX
SICU PM PROGRESS NOTE  ===============================  Interval Events: Patient tolerating CPAP trail with RSBI < 60. Leak test performed, confirmed >50% leak with cuff deflated. Patient extubated without issue.     HPI  54y Female PMHx HTN, T2DM, asthma, depression with recent diagnosis of invasive ampullary adenocarcinoma of the CBD admitted for cholangitis on 1/5, s/p ERCP 1/8, whipple 1/11, rapid response for GIB on floor 1/18 s/p MTP, RTOR s/p celiotomy, evacuation of hematoma, GDA bleed stopped and sutured, Abthera vac. Closure of abdomen with strattice mesh on 1/21, with inna drains and left lower JOURDAN drain in subcutaneous, s/p incisional bleed 1/23, bedside skin staples removed, JOURDAN drain removed, hemorrhage controlled, for continuation of ICU care for respiratory failure     VITAL SIGNS, INS/OUTS (last 24 hours):   --------------------------------------------------------------------------------------  I&O's Detail    28 Jan 2019 07:01  -  29 Jan 2019 07:00  --------------------------------------------------------  IN:    bumetanide Infusion: 37.5 mL    dexmedetomidine Infusion: 195.8 mL    fat emulsion (Fish Oil and Plant Based) 20% Infusion: 150 mL    IV PiggyBack: 1150 mL    TPN (Total Parenteral Nutrition): 1909 mL  Total IN: 3442.3 mL    OUT:    Bulb: 70 mL    Bulb: 115 mL    Indwelling Catheter - Urethral: 61338 mL    Nasoenteral Tube: 250 mL  Total OUT: 30531 mL    Total NET: -7842.7 mL      29 Jan 2019 07:01 - 29 Jan 2019 14:53  --------------------------------------------------------  IN:    dexmedetomidine Infusion: 20 mL    IV PiggyBack: 250 mL    TPN (Total Parenteral Nutrition): 415 mL  Total IN: 685 mL    OUT:    Bulb: 95 mL    Bulb: 75 mL    Indwelling Catheter - Urethral: 700 mL  Total OUT: 870 mL    Total NET: -185 mL      --------------------------------------------------------------------------------------    EXAM  XAM  NEUROLOGY  RASS: -1 to -2  Exam: Normal, NAD  HEENT  Exam: Normocephalic, atraumatic, EOMI  RESPIRATORY  Exam: Lungs clear to auscultation, Normal expansion/effort. Saturating well on NC  CARDIOVASCULAR  Exam: S1, S2.  Regular rate and rhythm. + Peripheral edema   GI/NUTRITION  Exam: Incisions c/d/i, Abdomen soft, Non-tender, Non-distended. JOURDAN with bilious output, LLQ JOURDAN with dark/serous output, no rebound or guarding  Current Diet:   VASCULAR  Exam: Extremities warm, pink, well-perfused.   MUSCULOSKELETAL  Exam: All extremities moving spontaneously without limitations.   SKIN  Exam: Good skin turgor, no skin breakdown.      METABOLIC/FLUIDS/ELECTROLYTES  fat emulsion (Fish Oil and Plant Based) 20% Infusion 12.5 mL/Hr IV Continuous <Continuous>  fat emulsion (Fish Oil and Plant Based) 20% Infusion 12.5 mL/Hr IV Continuous <Continuous>  fat emulsion (Fish Oil and Plant Based) 20% Infusion 12.5 mL/Hr IV Continuous <Continuous>  Parenteral Nutrition - Adult 1 Each TPN Continuous <Continuous>  Parenteral Nutrition - Adult 1 Each TPN Continuous <Continuous>      HEMATOLOGIC  [x] DVT Prophylaxis: enoxaparin Injectable 40 milliGRAM(s) SubCutaneous daily    Transfusions:	[] PRBC	[] Platelets		[] FFP	[] Cryoprecipitate    INFECTIOUS DISEASE  Antimicrobials/Immunologic Medications:  meropenem  IVPB 1000 milliGRAM(s) IV Intermittent every 8 hours  meropenem  IVPB      micafungin IVPB      micafungin IVPB 100 milliGRAM(s) IV Intermittent every 24 hours  vancomycin  IVPB 1000 milliGRAM(s) IV Intermittent daily    LABS  --------------------------------------------------------------------------------------                        8.1    10.63 )-----------( 258      ( 29 Jan 2019 02:30 )             24.5   Blood Gas Arterial Comprehensive (01.29.19 @ 08:10)    pH, Arterial: 7.51 pH    pCO2, Arterial: 34 mmHg    pO2, Arterial: 77 mmHg    HCO3, Arterial: 28 mmol/L    Base Excess, Arterial: 3.5: BASE EXCESS: REFERENCE RANGE = 0 (+/-) 2 mmol/l mmol/L    Oxygen Saturation, Arterial: 96.2 %    Blood Gas Arterial - Sodium: 127 mmol/L    Blood Gas Arterial - Potassium: 3.0 mmol/L    Blood Gas Arterial - Glucose: 182 mg/dL    Blood Gas Arterial - Chloride: 100 mmol/L    Blood Gas Arterial - Lactate: 1.4: Please note updated reference range. mmol/L    Blood Gas Arterial - Hemoglobin: 10.5 g/dL    Blood Gas Arterial - Hematocrit: 32.4: Delta: 25.9 on 01/29/  Delta: 25.9 on 01/29/ %    Hepatic Function Panel in AM (01.29.19 @ 02:30)    Protein Total, Serum: 5.9 g/dL    Albumin, Serum: 2.0 g/dL    Bilirubin Total, Serum: 3.5 mg/dL    Bilirubin Direct, Serum: 2.5 mg/dL    Alkaline Phosphatase, Serum: 792 u/L    Aspartate Aminotransferase (AST/SGOT): 119 u/L    Alanine Aminotransferase (ALT/SGPT): 41 u/L      --------------------------------------------------------------------------------------    IMAGING STUDIES  < from: Xray Chest 1 View- PORTABLE-Routine (01.29.19 @ 01:03) >  EXAM:  XR CHEST PORTABLE ROUTINE 1V        PROCEDURE DATE:  Jan 29 2019         INTERPRETATION:  TIME OF EXAM: January 29, 2019 at 12:12 AM    CLINICAL INFORMATION: Follow-up    TECHNIQUE:   Portable chest    INTERPRETATION:     The endotracheal and enteric tubes are in place. Bilateral perihilar   opacities in addition to layering pleural effusions likely pulmonary   edema are again seen about the same as the study of the day before. The   heart is not enlarged. No pneumothorax.      COMPARISON:  January 28      IMPRESSION:    1. Tubes and lines in place.  2. Pulmonary edema with pleural effusions.    < end of copied text >

## 2019-01-29 NOTE — PROGRESS NOTE ADULT - ASSESSMENT
54y Female PMHx HTN, T2DM, asthma, depression with recent diagnosis of invasive ampullary adenocarcinoma of the CBD admitted for cholangitis on 1/5, s/p ERCP 1/8, whipple 1/11, rapid response for GIB on floor 1/18 s/p MTP, RTOR s/p celiotomy, evacuation of hematoma, GDA bleed stopped and sutured, Abthera vac. Closure of abdomen with strattice mesh on 1/21, with inna drains and left lower JOURDAN drain in subcutaneous. Incisional bleed 1/23, bedside skin staples removed, JOURDAN drain removed, hemorrhage controlled, for continuation of ICU care.     Neuro:  -IV tylenol PRN for pain  -Versed PRN     Resp:  -Extubated  -Leak test from 50-80%, RSBI >60, successfully extubated    CV:  - vitals q1h, A line monitoring    GI:  - ct TPN through dedicated port  - daily electrolyte with ionized Ca as on TPN  - Bowel regimen    Renal:  - Strict I&O, monitor lytes, replete as needed  - IVF on hold for now   -Net negative 10L yesterday, bumex/lasix being held today  -Diamox x1 dose today     Endo:  - diabetic, FS q6h and low dose corrective regimen for hyperglycemia  - TPN with insulin     Heme:   -monitor H/H, stable   -ct lovenox vte ppx    ID:   - Vanc/Jose Carlos since 1/19       - Vanc trough prior to 3rd dose  - Micafungin since 1/23    - UA positive, urine cx 1/21 candida, micafungin since 1/23, stop on 2/1  - intubated since 1/18  - Swanson to be continued for UOP monitoring in critically ill pt   - abdominal incision with external tissue expander device in place 1/24    Access: [facility] central line, R axillary arterial line    Dispo: SICU    CC Diagnosis: Respiratory distress, fluid overload, malnutrition

## 2019-01-29 NOTE — PROGRESS NOTE ADULT - SUBJECTIVE AND OBJECTIVE BOX
NUTRITION NOTE  [id number] SOUSA  ===============================    Interval events: Patient extubated. Diuresed 11 liters in 24H.  Remains on TPN, NPO.    VITAL SIGNS:  T(C): 37.8 (19 @ 08:00), Max: 39.6 (19 @ 00:00)  HR: 77 (19 @ 09:11) (57 - 83)  BP: --  ABP: 124/57 (19 @ 08:00) (85/39 - 145/61)  ABP(mean): 82 (19 @ 08:00) (56 - 94)  RR: 18 (19 @ 09:11) (15 - 23)  SpO2: 99% (19 @ 09:11) (97% - 100%)  CVP(mm Hg): --   @ 07: @ 07:00  --------------------------------------------------------  IN: 3442.3 mL / OUT: 33213 mL / NET: -7842.7 mL     @ 07: @ 12:04  --------------------------------------------------------  IN: 685 mL / OUT: 820 mL / NET: -135 mL      Glucose 159-181      Neuro: Patient awake, responsive    CV: S1, S2 RRR    Pulm: Coarse b/s b/l    GI/Nutrition: Moderately distended.  B/L JOURDAN drains in place with bilious drainage, dressing and binder C/D/I    Extremity: B/L edema    PICC Site: C/D/I    Metabolic/FLUIDS/ELECTROLYTES/NUTRITION:  Gastrointestinal Medications:  bisacodyl Suppository 10 milliGRAM(s) Rectal once  fat emulsion (Fish Oil and Plant Based) 20% Infusion 12.5 mL/Hr IV Continuous <Continuous>  fat emulsion (Fish Oil and Plant Based) 20% Infusion 12.5 mL/Hr IV Continuous <Continuous>  fat emulsion (Fish Oil and Plant Based) 20% Infusion 12.5 mL/Hr IV Continuous <Continuous>  Parenteral Nutrition - Adult 1 Each TPN Continuous <Continuous>  Parenteral Nutrition - Adult 1 Each TPN Continuous <Continuous>    I&O's Detail  54y  Daily Weight in k.3 (2019 05:00)      131<L>  |  94<L>  |  17  ----------------------------<  182<H>  3.4<L>   |  25  |  0.61    Ca    8.0<L>      2019 02:30  Phos  2.9       Mg     2.1         TPro  5.9<L>  /  Alb  2.0<L>  /  TBili  3.5<H>  /  DBili  2.5<H>  /  AST  119<H>  /  ALT  41<H>  /  AlkPhos  792<H>      Diet: NPO/TPN    INFECTIOUS DISEASE:  Antimicrobials/Immunologic Medications:  meropenem  IVPB 1000 milliGRAM(s) IV Intermittent every 8 hours  meropenem  IVPB      micafungin IVPB      micafungin IVPB 100 milliGRAM(s) IV Intermittent every 24 hours  vancomycin  IVPB 1000 milliGRAM(s) IV Intermittent daily    RECENT CULTURES:    OTHER MEDICATIONS:  Endocrine/Metabolic Medications:  insulin lispro (HumaLOG) corrective regimen sliding scale   SubCutaneous every 4 hours    Genitourinary Medications:    Topical/Other Medications:  artificial  tears Solution 1 Drop(s) Both EYES every 12 hours  chlorhexidine 0.12% Liquid 15 milliLiter(s) Oral Mucosa two times a day  chlorhexidine 4% Liquid 1 Application(s) Topical <User Schedule>  petrolatum Ophthalmic Ointment 1 Application(s) Both EYES daily    ASSESSMENT/PLAN:  54y Female PMHx HTN, T2DM, asthma, depression with recent diagnosis of invasive ampullary adenocarcinoma of the CBD whom underwent an uneventful whipple 18. Patient has had an uptrending WBC since procedure, initially attributed to not restarting antibiotics for cholangitis, but experienced abdominal tenderness/distention and tachycardia yesterday and was transferred to SICU for close hemodynamic monitoring. Now s/p Celiotomy, evacuation of hematoma, cessation of gastroduodenal artery bleeding, and placement of Abthera vac . now s/p RTOR for abdominal washout     Continue TPN  Repleted lytes (NaCl, Potassium and Magnesium)  Increased Insulin to 23 Units      1.  Protein calorie malnutrition being optimized with TPN: CHO [180 ] gm.  AA [70 ] gm. Lipids [30 ] gm.  2.  Hyperglycemia managed with: [23 ] units of regular insulin    3.  Check fluid balance daily.  Strict I/O  [ ] [ ]   4.  Daily BMP, Ionized Calcium, Magnesium and Phosphorous   5.  Triglycerides at initiation of TPN and monthly [ ] [ ]   6.  Pepcid in TPN for GI  prophylaxis  [ ]     Nutrition Support [id number]

## 2019-01-29 NOTE — PROGRESS NOTE ADULT - ASSESSMENT
54y Female PMHx HTN, T2DM, asthma, depression with recent diagnosis of invasive ampullary adenocarcinoma of the CBD whom underwent an uneventful whipple 1/11/18. Patient has had an uptrending WBC since procedure, initially attributed to not restarting antibiotics for cholangitis, but experienced abdominal tenderness/distention and tachycardia  and was transferred to SICU for close hemodynamic monitoring. Now s/p Celiotomy, evacuation of hematoma, cessation of gastroduodenal artery bleeding, and placement of Abthera vac 1/18. now s/p RTOR for abdominal washout 1/21.    PLAN:  -Pain control as needed  -Monitor vs, uop  -Monitor abd incision and closure device  -wean vent as tolerated  -Serial CBCs, H/H  -cont meropenem, vanc, micafungin  -npo/ivf/TPN  -lovenox for DVT ppx  -poss CT today  -consider enteral feeds  -appreciate SICU care    D Team Surgery  [id number]

## 2019-01-29 NOTE — PROGRESS NOTE ADULT - SUBJECTIVE AND OBJECTIVE BOX
SICU Morning Progress Note  POD #          	SICU Day #        Interval/Overnight Events:   Patient was on a bumex gtt yesterday with good urinary response, she received 50 of albumin and 40 of lasix yesterday as well. Patient Vanco trough elevated dose held and decreased to 500 qd. Patient febrile overnight to 39.6    HPI:  54y Female PMHx HTN, T2DM, asthma, depression with recent diagnosis of invasive ampullary adenocarcinoma of the CBD admitted for cholangitis on 1/5, s/p ERCP 1/8, whipple 1/11, rapid response for GIB on floor 1/18 s/p MTP, RTOR s/p celiotomy, evacuation of hematoma, GDA bleed stopped and sutured, Abthera vac. Closure of abdomen with strattice mesh on 1/21, with inna drains and left lower JOURDAN drain in subcutaneous, s/p incisional bleed 1/23, bedside skin staples removed, JOURDAN drain removed, hemorrhage controlled, for continuation of ICU care for respiratory failure     Allergies: No Known Allergies      MEDICATIONS:   --------------------------------------------------------------------------------------  Neurologic Medications  dexmedetomidine Infusion 0.5 MICROgram(s)/kG/Hr IV Continuous <Continuous>  midazolam Injectable 2 milliGRAM(s) IV Push every 4 hours PRN agitation    Respiratory Medications    Cardiovascular Medications  buMETAnide Infusion 1 mG/Hr IV Continuous <Continuous>    Gastrointestinal Medications  dextrose 5%. 1000 milliLiter(s) IV Continuous <Continuous>  fat emulsion (Fish Oil and Plant Based) 20% Infusion 12.5 mL/Hr IV Continuous <Continuous>  fat emulsion (Fish Oil and Plant Based) 20% Infusion 12.5 mL/Hr IV Continuous <Continuous>  Parenteral Nutrition - Adult 1 Each TPN Continuous <Continuous>    Genitourinary Medications    Hematologic/Oncologic Medications  enoxaparin Injectable 40 milliGRAM(s) SubCutaneous daily    Antimicrobial/Immunologic Medications  meropenem  IVPB 1000 milliGRAM(s) IV Intermittent every 8 hours  meropenem  IVPB      micafungin IVPB      micafungin IVPB 100 milliGRAM(s) IV Intermittent every 24 hours  vancomycin  IVPB 1000 milliGRAM(s) IV Intermittent daily    Endocrine/Metabolic Medications  insulin lispro (HumaLOG) corrective regimen sliding scale   SubCutaneous every 4 hours    Topical/Other Medications  artificial  tears Solution 1 Drop(s) Both EYES every 12 hours  chlorhexidine 0.12% Liquid 15 milliLiter(s) Oral Mucosa two times a day  chlorhexidine 4% Liquid 1 Application(s) Topical <User Schedule>  petrolatum Ophthalmic Ointment 1 Application(s) Both EYES daily    --------------------------------------------------------------------------------------    VITAL SIGNS, INS/OUTS (last 24 hours):  --------------------------------------------------------------------------------------  ((Insert SICU Vitals/Is+Os here))***  --------------------------------------------------------------------------------------    EXAM  NEUROLOGY  RASS: -2  Exam: Normal, NAD  HEENT  Exam: Normocephalic, atraumatic, EOMI  RESPIRATORY  Exam: Lungs clear to auscultation, Normal expansion/effort.   Mechanical Ventilation: Mode: CPAP with PS, FiO2: 30, PEEP: 5, PS: 10, MAP: 8, PIP: 18  CARDIOVASCULAR  Exam: S1, S2.  Regular rate and rhythm. + Peripheral edema   GI/NUTRITION  Exam: Abdomen soft, Non-tender, Non-distended. JOURDAN with bilious output, LLQ JOURDAN with dark/serous output, no rebound or guarding  Current Diet:   VASCULAR  Exam: Extremities warm, pink, well-perfused.   MUSCULOSKELETAL  Exam: All extremities moving spontaneously without limitations.   SKIN  Exam: Good skin turgor, no skin breakdown.   METABOLIC/FLUIDS/ELECTROLYTES  dextrose 5%. 1000 milliLiter(s) IV Continuous <Continuous>  fat emulsion (Fish Oil and Plant Based) 20% Infusion 12.5 mL/Hr IV Continuous <Continuous>  fat emulsion (Fish Oil and Plant Based) 20% Infusion 12.5 mL/Hr IV Continuous <Continuous>  Parenteral Nutrition - Adult 1 Each TPN Continuous <Continuous>    HEMATOLOGIC  [x] VTE Prophylaxis: enoxaparin Injectable 40 milliGRAM(s) SubCutaneous daily  Transfusions:	[] PRBC	[] Platelets		[] FFP	[] Cryoprecipitate    INFECTIOUS DISEASE  Antimicrobials/Immunologic Medications:  meropenem  IVPB 1000 milliGRAM(s) IV Intermittent every 8 hours  meropenem  IVPB      micafungin IVPB      micafungin IVPB 100 milliGRAM(s) IV Intermittent every 24 hours  vancomycin  IVPB 1000 milliGRAM(s) IV Intermittent daily    Day #      of     ***    Tubes/Lines/Drains    [x] Peripheral IV  [] Central Venous Line     	[] R	[] L	[] IJ	[] Fem	[] SC	Date Placed:   [] Arterial Line		[] R	[] L	[] Fem	[] Rad	[] Ax	Date Placed:   [] PICC		[] Midline		[] Mediport  [] Urinary Catheter		Date Placed:   [x] Necessity of urinary, arterial, and venous catheters discussed    LABS  --------------------------------------------------------------------------------------  ((Insert SICU Labs here))***  --------------------------------------------------------------------------------------    OTHER LABORATORY:   IMAGING STUDIES:   CXR: SICU Morning Progress Note    Interval/Overnight Events:   Patient was on a bumex gtt yesterday with good urinary response, she received 50 of albumin and 40 of lasix yesterday as well. Patient Vanco trough elevated dose held and decreased to 500 qd. Patient febrile overnight to 39.6. Pt fever resolved with tylenol. Not tolerating CPAP well. Net negative 10L over last 24 hrs.     HPI:  54y Female PMHx HTN, T2DM, asthma, depression with recent diagnosis of invasive ampullary adenocarcinoma of the CBD admitted for cholangitis on 1/5, s/p ERCP 1/8, whipple 1/11, rapid response for GIB on floor 1/18 s/p MTP, RTOR s/p celiotomy, evacuation of hematoma, GDA bleed stopped and sutured, Abthera vac. Closure of abdomen with strattice mesh on 1/21, with inna drains and left lower JOURDAN drain in subcutaneous, s/p incisional bleed 1/23, bedside skin staples removed, JOURDAN drain removed, hemorrhage controlled, for continuation of ICU care for respiratory failure     Allergies: No Known Allergies      MEDICATIONS:   --------------------------------------------------------------------------------------  Neurologic Medications  dexmedetomidine Infusion 0.5 MICROgram(s)/kG/Hr IV Continuous <Continuous>  midazolam Injectable 2 milliGRAM(s) IV Push every 4 hours PRN agitation    Respiratory Medications    Cardiovascular Medications  buMETAnide Infusion 1 mG/Hr IV Continuous <Continuous>    Gastrointestinal Medications  dextrose 5%. 1000 milliLiter(s) IV Continuous <Continuous>  fat emulsion (Fish Oil and Plant Based) 20% Infusion 12.5 mL/Hr IV Continuous <Continuous>  fat emulsion (Fish Oil and Plant Based) 20% Infusion 12.5 mL/Hr IV Continuous <Continuous>  Parenteral Nutrition - Adult 1 Each TPN Continuous <Continuous>    Genitourinary Medications    Hematologic/Oncologic Medications  enoxaparin Injectable 40 milliGRAM(s) SubCutaneous daily    Antimicrobial/Immunologic Medications  meropenem  IVPB 1000 milliGRAM(s) IV Intermittent every 8 hours  meropenem  IVPB      micafungin IVPB      micafungin IVPB 100 milliGRAM(s) IV Intermittent every 24 hours  vancomycin  IVPB 1000 milliGRAM(s) IV Intermittent daily    Endocrine/Metabolic Medications  insulin lispro (HumaLOG) corrective regimen sliding scale   SubCutaneous every 4 hours    Topical/Other Medications  artificial  tears Solution 1 Drop(s) Both EYES every 12 hours  chlorhexidine 0.12% Liquid 15 milliLiter(s) Oral Mucosa two times a day  chlorhexidine 4% Liquid 1 Application(s) Topical <User Schedule>  petrolatum Ophthalmic Ointment 1 Application(s) Both EYES daily    Vital Signs Last 24 Hrs  T(C): 37.2 (29 Jan 2019 04:00), Max: 39.6 (29 Jan 2019 00:00)  T(F): 98.9 (29 Jan 2019 04:00), Max: 103.3 (29 Jan 2019 00:00)  HR: 62 (29 Jan 2019 06:47) (57 - 85)  BP: --  BP(mean): --  RR: 23 (29 Jan 2019 06:00) (15 - 25)  SpO2: 99% (29 Jan 2019 06:47) (96% - 100%)    I&O's Detail    28 Jan 2019 07:01  -  29 Jan 2019 07:00  --------------------------------------------------------  IN:    bumetanide Infusion: 37.5 mL    dexmedetomidine Infusion: 195.8 mL    fat emulsion (Fish Oil and Plant Based) 20% Infusion: 150 mL    IV PiggyBack: 1150 mL    TPN (Total Parenteral Nutrition): 1909 mL  Total IN: 3442.3 mL    OUT:    Bulb: 70 mL    Bulb: 115 mL    Indwelling Catheter - Urethral: 62362 mL    Nasoenteral Tube: 250 mL  Total OUT: 62036 mL    Total NET: -7842.7 mL                                8.1    10.63 )-----------( 258      ( 29 Jan 2019 02:30 )             24.5       01-29    131<L>  |  94<L>  |  17  ----------------------------<  182<H>  3.4<L>   |  25  |  0.61    Ca    8.0<L>      29 Jan 2019 02:30  Phos  2.9     01-29  Mg     2.1     01-29    TPro  5.9<L>  /  Alb  2.0<L>  /  TBili  3.5<H>  /  DBili  2.5<H>  /  AST  119<H>  /  ALT  41<H>  /  AlkPhos  792<H>  01-29      CAPILLARY BLOOD GLUCOSE      POCT Blood Glucose.: 171 mg/dL (29 Jan 2019 05:48)  POCT Blood Glucose.: 187 mg/dL (29 Jan 2019 02:28)  POCT Blood Glucose.: 185 mg/dL (28 Jan 2019 22:54)  POCT Blood Glucose.: 179 mg/dL (28 Jan 2019 16:51)  POCT Blood Glucose.: 180 mg/dL (28 Jan 2019 13:58)  POCT Blood Glucose.: 173 mg/dL (28 Jan 2019 09:55)      LIVER FUNCTIONS - ( 29 Jan 2019 02:30 )  Alb: 2.0 g/dL / Pro: 5.9 g/dL / ALK PHOS: 792 u/L / ALT: 41 u/L / AST: 119 u/L / GGT: x                     EXAM  NEUROLOGY  RASS: -2  Exam: Normal, NAD, A&Ox3  HEENT  Exam: Normocephalic, atraumatic, EOMI  RESPIRATORY  Exam: Lungs clear to auscultation, Normal expansion/effort.   Mechanical Ventilation: Mode: CPAP with PS, FiO2: 30, PEEP: 5, PS: 10, MAP: 8, PIP: 18  CARDIOVASCULAR  Exam: S1, S2.  Regular rate and rhythm. + Peripheral edema   GI/NUTRITION  Exam: Abdomen soft, Non-tender, Non-distended. JOURDAN with bilious output, LLQ JOURDAN with dark/serous output, no rebound or guarding  Current Diet:   VASCULAR  Exam: Extremities warm, pink, well-perfused.   MUSCULOSKELETAL  Exam: All extremities moving spontaneously without limitations.   SKIN  Exam: Good skin turgor, no skin breakdown.   METABOLIC/FLUIDS/ELECTROLYTES  dextrose 5%. 1000 milliLiter(s) IV Continuous <Continuous>  fat emulsion (Fish Oil and Plant Based) 20% Infusion 12.5 mL/Hr IV Continuous <Continuous>  fat emulsion (Fish Oil and Plant Based) 20% Infusion 12.5 mL/Hr IV Continuous <Continuous>  Parenteral Nutrition - Adult 1 Each TPN Continuous <Continuous>    HEMATOLOGIC  [x] VTE Prophylaxis: enoxaparin Injectable 40 milliGRAM(s) SubCutaneous daily  Transfusions:	[] PRBC	[] Platelets		[] FFP	[] Cryoprecipitate    INFECTIOUS DISEASE  Antimicrobials/Immunologic Medications:  meropenem  IVPB 1000 milliGRAM(s) IV Intermittent every 8 hours  meropenem  IVPB      micafungin IVPB      micafungin IVPB 100 milliGRAM(s) IV Intermittent every 24 hours  vancomycin  IVPB 1000 milliGRAM(s) IV Intermittent daily    Day #      of     ***    Tubes/Lines/Drains    [x] Peripheral IV  [x] Central Venous Line     	[] R	[x] L	[x] IJ	[] Fem	[] SC	Date Placed:   [] Arterial Line		[] R	[] L	[] Fem	[] Rad	[] Ax	Date Placed:   [] PICC		[] Midline		[] Mediport  [] Urinary Catheter		Date Placed:   [x] Necessity of urinary, arterial, and venous catheters discussed    OTHER LABORATORY:   IMAGING STUDIES:   CXR: Improvement from yesterday

## 2019-01-30 LAB
ALBUMIN SERPL ELPH-MCNC: 2.3 G/DL — LOW (ref 3.3–5)
ALP SERPL-CCNC: 978 U/L — HIGH (ref 40–120)
ALT FLD-CCNC: 58 U/L — HIGH (ref 4–33)
ANION GAP SERPL CALC-SCNC: 10 MMO/L — SIGNIFICANT CHANGE UP (ref 7–14)
ANION GAP SERPL CALC-SCNC: 12 MMO/L — SIGNIFICANT CHANGE UP (ref 7–14)
ANISOCYTOSIS BLD QL: SLIGHT — SIGNIFICANT CHANGE UP
AST SERPL-CCNC: 157 U/L — HIGH (ref 4–32)
BASE EXCESS BLDA CALC-SCNC: -0.5 MMOL/L — SIGNIFICANT CHANGE UP
BASOPHILS # BLD AUTO: 0.08 K/UL — SIGNIFICANT CHANGE UP (ref 0–0.2)
BASOPHILS NFR BLD AUTO: 0.5 % — SIGNIFICANT CHANGE UP (ref 0–2)
BASOPHILS NFR SPEC: 0.8 % — SIGNIFICANT CHANGE UP (ref 0–2)
BILIRUB DIRECT SERPL-MCNC: 3.3 MG/DL — HIGH (ref 0.1–0.2)
BILIRUB SERPL-MCNC: 4.4 MG/DL — HIGH (ref 0.2–1.2)
BLASTS # FLD: 0 % — SIGNIFICANT CHANGE UP (ref 0–0)
BUN SERPL-MCNC: 15 MG/DL — SIGNIFICANT CHANGE UP (ref 7–23)
BUN SERPL-MCNC: 15 MG/DL — SIGNIFICANT CHANGE UP (ref 7–23)
CALCIUM SERPL-MCNC: 8.2 MG/DL — LOW (ref 8.4–10.5)
CALCIUM SERPL-MCNC: 8.2 MG/DL — LOW (ref 8.4–10.5)
CHLORIDE BLDA-SCNC: 106 MMOL/L — SIGNIFICANT CHANGE UP (ref 96–108)
CHLORIDE SERPL-SCNC: 99 MMOL/L — SIGNIFICANT CHANGE UP (ref 98–107)
CHLORIDE SERPL-SCNC: 99 MMOL/L — SIGNIFICANT CHANGE UP (ref 98–107)
CO2 SERPL-SCNC: 22 MMOL/L — SIGNIFICANT CHANGE UP (ref 22–31)
CO2 SERPL-SCNC: 22 MMOL/L — SIGNIFICANT CHANGE UP (ref 22–31)
CREAT SERPL-MCNC: 0.49 MG/DL — LOW (ref 0.5–1.3)
CREAT SERPL-MCNC: 0.49 MG/DL — LOW (ref 0.5–1.3)
EOSINOPHIL # BLD AUTO: 0.14 K/UL — SIGNIFICANT CHANGE UP (ref 0–0.5)
EOSINOPHIL NFR BLD AUTO: 0.9 % — SIGNIFICANT CHANGE UP (ref 0–6)
EOSINOPHIL NFR FLD: 0.8 % — SIGNIFICANT CHANGE UP (ref 0–6)
GIANT PLATELETS BLD QL SMEAR: PRESENT — SIGNIFICANT CHANGE UP
GLUCOSE BLDA-MCNC: 178 MG/DL — HIGH (ref 70–99)
GLUCOSE SERPL-MCNC: 168 MG/DL — HIGH (ref 70–99)
GLUCOSE SERPL-MCNC: 185 MG/DL — HIGH (ref 70–99)
HCO3 BLDA-SCNC: 24 MMOL/L — SIGNIFICANT CHANGE UP (ref 22–26)
HCT VFR BLD CALC: 25.9 % — LOW (ref 34.5–45)
HCT VFR BLDA CALC: 26.1 % — LOW (ref 34.5–46.5)
HGB BLD-MCNC: 8.6 G/DL — LOW (ref 11.5–15.5)
HGB BLDA-MCNC: 8.4 G/DL — LOW (ref 11.5–15.5)
IMM GRANULOCYTES NFR BLD AUTO: 1.5 % — SIGNIFICANT CHANGE UP (ref 0–1.5)
LACTATE BLDA-SCNC: 2 MMOL/L — SIGNIFICANT CHANGE UP (ref 0.5–2)
LYMPHOCYTES # BLD AUTO: 1.9 K/UL — SIGNIFICANT CHANGE UP (ref 1–3.3)
LYMPHOCYTES # BLD AUTO: 12.8 % — LOW (ref 13–44)
LYMPHOCYTES NFR SPEC AUTO: 6.9 % — LOW (ref 13–44)
MACROCYTES BLD QL: SLIGHT — SIGNIFICANT CHANGE UP
MAGNESIUM SERPL-MCNC: 1.9 MG/DL — SIGNIFICANT CHANGE UP (ref 1.6–2.6)
MAGNESIUM SERPL-MCNC: 1.9 MG/DL — SIGNIFICANT CHANGE UP (ref 1.6–2.6)
MCHC RBC-ENTMCNC: 28.2 PG — SIGNIFICANT CHANGE UP (ref 27–34)
MCHC RBC-ENTMCNC: 33.2 % — SIGNIFICANT CHANGE UP (ref 32–36)
MCV RBC AUTO: 84.9 FL — SIGNIFICANT CHANGE UP (ref 80–100)
METAMYELOCYTES # FLD: 0.9 % — SIGNIFICANT CHANGE UP (ref 0–1)
MONOCYTES # BLD AUTO: 2.59 K/UL — HIGH (ref 0–0.9)
MONOCYTES NFR BLD AUTO: 17.4 % — HIGH (ref 2–14)
MONOCYTES NFR BLD: 15.5 % — HIGH (ref 2–9)
MYELOCYTES NFR BLD: 0.9 % — HIGH (ref 0–0)
NEUTROPHIL AB SER-ACNC: 66.4 % — SIGNIFICANT CHANGE UP (ref 43–77)
NEUTROPHILS # BLD AUTO: 9.92 K/UL — HIGH (ref 1.8–7.4)
NEUTROPHILS NFR BLD AUTO: 66.9 % — SIGNIFICANT CHANGE UP (ref 43–77)
NEUTS BAND # BLD: 5.2 % — SIGNIFICANT CHANGE UP (ref 0–6)
NRBC # FLD: 0 K/UL — LOW (ref 25–125)
OTHER - HEMATOLOGY %: 0 — SIGNIFICANT CHANGE UP
PCO2 BLDA: 31 MMHG — LOW (ref 32–48)
PH BLDA: 7.48 PH — HIGH (ref 7.35–7.45)
PHOSPHATE SERPL-MCNC: 2.2 MG/DL — LOW (ref 2.5–4.5)
PHOSPHATE SERPL-MCNC: 2.9 MG/DL — SIGNIFICANT CHANGE UP (ref 2.5–4.5)
PLATELET # BLD AUTO: 319 K/UL — SIGNIFICANT CHANGE UP (ref 150–400)
PLATELET COUNT - ESTIMATE: NORMAL — SIGNIFICANT CHANGE UP
PMV BLD: 10.4 FL — SIGNIFICANT CHANGE UP (ref 7–13)
PO2 BLDA: 58 MMHG — LOW (ref 83–108)
POIKILOCYTOSIS BLD QL AUTO: SLIGHT — SIGNIFICANT CHANGE UP
POTASSIUM BLDA-SCNC: 3 MMOL/L — LOW (ref 3.4–4.5)
POTASSIUM SERPL-MCNC: 3.2 MMOL/L — LOW (ref 3.5–5.3)
POTASSIUM SERPL-MCNC: 3.6 MMOL/L — SIGNIFICANT CHANGE UP (ref 3.5–5.3)
POTASSIUM SERPL-SCNC: 3.2 MMOL/L — LOW (ref 3.5–5.3)
POTASSIUM SERPL-SCNC: 3.6 MMOL/L — SIGNIFICANT CHANGE UP (ref 3.5–5.3)
PROMYELOCYTES # FLD: 0 % — SIGNIFICANT CHANGE UP (ref 0–0)
PROT SERPL-MCNC: 6.4 G/DL — SIGNIFICANT CHANGE UP (ref 6–8.3)
RBC # BLD: 3.05 M/UL — LOW (ref 3.8–5.2)
RBC # FLD: 17 % — HIGH (ref 10.3–14.5)
SAO2 % BLDA: 90.6 % — LOW (ref 95–99)
SMUDGE CELLS # BLD: PRESENT — SIGNIFICANT CHANGE UP
SODIUM BLDA-SCNC: 130 MMOL/L — LOW (ref 136–146)
SODIUM SERPL-SCNC: 131 MMOL/L — LOW (ref 135–145)
SODIUM SERPL-SCNC: 133 MMOL/L — LOW (ref 135–145)
TARGETS BLD QL SMEAR: SLIGHT — SIGNIFICANT CHANGE UP
VARIANT LYMPHS # BLD: 1.7 % — SIGNIFICANT CHANGE UP
WBC # BLD: 14.86 K/UL — HIGH (ref 3.8–10.5)
WBC # FLD AUTO: 14.86 K/UL — HIGH (ref 3.8–10.5)

## 2019-01-30 PROCEDURE — 74177 CT ABD & PELVIS W/CONTRAST: CPT | Mod: 26

## 2019-01-30 PROCEDURE — 99291 CRITICAL CARE FIRST HOUR: CPT

## 2019-01-30 PROCEDURE — 76604 US EXAM CHEST: CPT | Mod: 26

## 2019-01-30 PROCEDURE — 71045 X-RAY EXAM CHEST 1 VIEW: CPT | Mod: 26

## 2019-01-30 PROCEDURE — 99292 CRITICAL CARE ADDL 30 MIN: CPT

## 2019-01-30 RX ORDER — FUROSEMIDE 40 MG
40 TABLET ORAL ONCE
Qty: 0 | Refills: 0 | Status: COMPLETED | OUTPATIENT
Start: 2019-01-30 | End: 2019-01-30

## 2019-01-30 RX ORDER — ACETAMINOPHEN 500 MG
650 TABLET ORAL EVERY 6 HOURS
Qty: 0 | Refills: 0 | Status: DISCONTINUED | OUTPATIENT
Start: 2019-01-30 | End: 2019-01-30

## 2019-01-30 RX ORDER — ACETAMINOPHEN 500 MG
575 TABLET ORAL EVERY 6 HOURS
Qty: 0 | Refills: 0 | Status: DISCONTINUED | OUTPATIENT
Start: 2019-01-30 | End: 2019-01-30

## 2019-01-30 RX ORDER — FUROSEMIDE 40 MG
40 TABLET ORAL ONCE
Qty: 0 | Refills: 0 | Status: DISCONTINUED | OUTPATIENT
Start: 2019-01-30 | End: 2019-01-30

## 2019-01-30 RX ORDER — LABETALOL HCL 100 MG
10 TABLET ORAL ONCE
Qty: 0 | Refills: 0 | Status: COMPLETED | OUTPATIENT
Start: 2019-01-30 | End: 2019-01-30

## 2019-01-30 RX ORDER — POTASSIUM CHLORIDE 20 MEQ
10 PACKET (EA) ORAL ONCE
Qty: 0 | Refills: 0 | Status: COMPLETED | OUTPATIENT
Start: 2019-01-30 | End: 2019-01-30

## 2019-01-30 RX ORDER — QUETIAPINE FUMARATE 200 MG/1
25 TABLET, FILM COATED ORAL AT BEDTIME
Qty: 0 | Refills: 0 | Status: DISCONTINUED | OUTPATIENT
Start: 2019-01-30 | End: 2019-01-31

## 2019-01-30 RX ORDER — OXYCODONE HYDROCHLORIDE 5 MG/1
10 TABLET ORAL EVERY 4 HOURS
Qty: 0 | Refills: 0 | Status: DISCONTINUED | OUTPATIENT
Start: 2019-01-30 | End: 2019-02-06

## 2019-01-30 RX ORDER — ELECTROLYTE SOLUTION,INJ
1 VIAL (ML) INTRAVENOUS
Qty: 0 | Refills: 0 | Status: DISCONTINUED | OUTPATIENT
Start: 2019-01-30 | End: 2019-01-30

## 2019-01-30 RX ORDER — LABETALOL HCL 100 MG
300 TABLET ORAL THREE TIMES A DAY
Qty: 0 | Refills: 0 | Status: DISCONTINUED | OUTPATIENT
Start: 2019-01-30 | End: 2019-01-30

## 2019-01-30 RX ORDER — I.V. FAT EMULSION 20 G/100ML
12.5 EMULSION INTRAVENOUS
Qty: 30 | Refills: 0 | Status: DISCONTINUED | OUTPATIENT
Start: 2019-01-30 | End: 2019-01-30

## 2019-01-30 RX ORDER — DEXTROSE 10 % IN WATER 10 %
1000 INTRAVENOUS SOLUTION INTRAVENOUS
Qty: 0 | Refills: 0 | Status: DISCONTINUED | OUTPATIENT
Start: 2019-01-30 | End: 2019-01-30

## 2019-01-30 RX ORDER — HYDROMORPHONE HYDROCHLORIDE 2 MG/ML
1 INJECTION INTRAMUSCULAR; INTRAVENOUS; SUBCUTANEOUS EVERY 4 HOURS
Qty: 0 | Refills: 0 | Status: DISCONTINUED | OUTPATIENT
Start: 2019-01-30 | End: 2019-01-30

## 2019-01-30 RX ORDER — LABETALOL HCL 100 MG
200 TABLET ORAL THREE TIMES A DAY
Qty: 0 | Refills: 0 | Status: DISCONTINUED | OUTPATIENT
Start: 2019-01-30 | End: 2019-01-31

## 2019-01-30 RX ORDER — POTASSIUM CHLORIDE 20 MEQ
10 PACKET (EA) ORAL ONCE
Qty: 0 | Refills: 0 | Status: DISCONTINUED | OUTPATIENT
Start: 2019-01-30 | End: 2019-01-30

## 2019-01-30 RX ORDER — MIRTAZAPINE 45 MG/1
15 TABLET, ORALLY DISINTEGRATING ORAL ONCE
Qty: 0 | Refills: 0 | Status: COMPLETED | OUTPATIENT
Start: 2019-01-30 | End: 2019-01-30

## 2019-01-30 RX ORDER — HYDROMORPHONE HYDROCHLORIDE 2 MG/ML
0.5 INJECTION INTRAMUSCULAR; INTRAVENOUS; SUBCUTANEOUS EVERY 4 HOURS
Qty: 0 | Refills: 0 | Status: DISCONTINUED | OUTPATIENT
Start: 2019-01-30 | End: 2019-01-30

## 2019-01-30 RX ORDER — HALOPERIDOL DECANOATE 100 MG/ML
2.5 INJECTION INTRAMUSCULAR ONCE
Qty: 0 | Refills: 0 | Status: COMPLETED | OUTPATIENT
Start: 2019-01-30 | End: 2019-01-30

## 2019-01-30 RX ORDER — SODIUM CHLORIDE 9 MG/ML
1000 INJECTION, SOLUTION INTRAVENOUS
Qty: 0 | Refills: 0 | Status: DISCONTINUED | OUTPATIENT
Start: 2019-01-30 | End: 2019-01-31

## 2019-01-30 RX ORDER — OXYCODONE HYDROCHLORIDE 5 MG/1
5 TABLET ORAL EVERY 4 HOURS
Qty: 0 | Refills: 0 | Status: DISCONTINUED | OUTPATIENT
Start: 2019-01-30 | End: 2019-02-06

## 2019-01-30 RX ORDER — HYDROMORPHONE HYDROCHLORIDE 2 MG/ML
0.5 INJECTION INTRAMUSCULAR; INTRAVENOUS; SUBCUTANEOUS EVERY 4 HOURS
Qty: 0 | Refills: 0 | Status: DISCONTINUED | OUTPATIENT
Start: 2019-01-30 | End: 2019-02-06

## 2019-01-30 RX ORDER — ACETAMINOPHEN 500 MG
575 TABLET ORAL ONCE
Qty: 0 | Refills: 0 | Status: DISCONTINUED | OUTPATIENT
Start: 2019-01-30 | End: 2019-01-30

## 2019-01-30 RX ORDER — ACETAMINOPHEN 500 MG
1000 TABLET ORAL ONCE
Qty: 0 | Refills: 0 | Status: COMPLETED | OUTPATIENT
Start: 2019-01-30 | End: 2019-01-30

## 2019-01-30 RX ORDER — METOPROLOL TARTRATE 50 MG
5 TABLET ORAL ONCE
Qty: 0 | Refills: 0 | Status: COMPLETED | OUTPATIENT
Start: 2019-01-30 | End: 2019-01-30

## 2019-01-30 RX ORDER — POTASSIUM PHOSPHATE, MONOBASIC POTASSIUM PHOSPHATE, DIBASIC 236; 224 MG/ML; MG/ML
15 INJECTION, SOLUTION INTRAVENOUS ONCE
Qty: 0 | Refills: 0 | Status: COMPLETED | OUTPATIENT
Start: 2019-01-30 | End: 2019-01-30

## 2019-01-30 RX ORDER — HALOPERIDOL DECANOATE 100 MG/ML
5 INJECTION INTRAMUSCULAR ONCE
Qty: 0 | Refills: 0 | Status: COMPLETED | OUTPATIENT
Start: 2019-01-30 | End: 2019-01-30

## 2019-01-30 RX ORDER — ACETAMINOPHEN 500 MG
650 TABLET ORAL EVERY 6 HOURS
Qty: 0 | Refills: 0 | Status: COMPLETED | OUTPATIENT
Start: 2019-01-30 | End: 2019-02-02

## 2019-01-30 RX ADMIN — Medication 300 MILLIGRAM(S): at 12:34

## 2019-01-30 RX ADMIN — MEROPENEM 100 MILLIGRAM(S): 1 INJECTION INTRAVENOUS at 06:06

## 2019-01-30 RX ADMIN — Medication 1: at 23:39

## 2019-01-30 RX ADMIN — Medication 10 MILLIGRAM(S): at 07:46

## 2019-01-30 RX ADMIN — QUETIAPINE FUMARATE 25 MILLIGRAM(S): 200 TABLET, FILM COATED ORAL at 22:07

## 2019-01-30 RX ADMIN — Medication 400 MILLIGRAM(S): at 00:09

## 2019-01-30 RX ADMIN — Medication 650 MILLIGRAM(S): at 18:17

## 2019-01-30 RX ADMIN — Medication 2: at 18:16

## 2019-01-30 RX ADMIN — HALOPERIDOL DECANOATE 5 MILLIGRAM(S): 100 INJECTION INTRAMUSCULAR at 16:40

## 2019-01-30 RX ADMIN — SODIUM CHLORIDE 75 MILLILITER(S): 9 INJECTION, SOLUTION INTRAVENOUS at 20:15

## 2019-01-30 RX ADMIN — HYDROMORPHONE HYDROCHLORIDE 1 MILLIGRAM(S): 2 INJECTION INTRAMUSCULAR; INTRAVENOUS; SUBCUTANEOUS at 04:00

## 2019-01-30 RX ADMIN — Medication 5 MILLIGRAM(S): at 06:06

## 2019-01-30 RX ADMIN — Medication 200 MILLIGRAM(S): at 23:39

## 2019-01-30 RX ADMIN — Medication 1 DROP(S): at 18:16

## 2019-01-30 RX ADMIN — Medication 650 MILLIGRAM(S): at 12:16

## 2019-01-30 RX ADMIN — Medication 1: at 12:47

## 2019-01-30 RX ADMIN — HALOPERIDOL DECANOATE 2.5 MILLIGRAM(S): 100 INJECTION INTRAMUSCULAR at 12:15

## 2019-01-30 RX ADMIN — Medication 40 MILLIGRAM(S): at 06:39

## 2019-01-30 RX ADMIN — Medication 100 MILLIEQUIVALENT(S): at 06:06

## 2019-01-30 RX ADMIN — Medication 1 DROP(S): at 06:06

## 2019-01-30 RX ADMIN — Medication 1: at 06:06

## 2019-01-30 RX ADMIN — HYDROMORPHONE HYDROCHLORIDE 1 MILLIGRAM(S): 2 INJECTION INTRAMUSCULAR; INTRAVENOUS; SUBCUTANEOUS at 03:45

## 2019-01-30 RX ADMIN — POTASSIUM PHOSPHATE, MONOBASIC POTASSIUM PHOSPHATE, DIBASIC 62.5 MILLIMOLE(S): 236; 224 INJECTION, SOLUTION INTRAVENOUS at 06:06

## 2019-01-30 RX ADMIN — ENOXAPARIN SODIUM 40 MILLIGRAM(S): 100 INJECTION SUBCUTANEOUS at 12:15

## 2019-01-30 RX ADMIN — CHLORHEXIDINE GLUCONATE 1 APPLICATION(S): 213 SOLUTION TOPICAL at 14:01

## 2019-01-30 RX ADMIN — MIRTAZAPINE 15 MILLIGRAM(S): 45 TABLET, ORALLY DISINTEGRATING ORAL at 23:39

## 2019-01-30 NOTE — CHART NOTE - NSCHARTNOTEFT_GEN_A_CORE
: Vincent    INDICATION: SOB    PROCEDURE:  [ ] LIMITED ECHO  [x] LIMITED CHEST  [ ] LIMITED RETROPERITONEAL  [ ] LIMITED ABDOMINAL  [ ] LIMITED DVT  [ ] NEEDLE GUIDANCE VASCULAR  [ ] NEEDLE GUIDANCE THORACENTESIS  [ ] NEEDLE GUIDANCE PARACENTESIS  [ ] NEEDLE GUIDANCE PERICARDIOCENTESIS  [ ] OTHER    FINDINGS: multiple B lines in bilateral lung fields. : Vincent    INDICATION: SOB    PROCEDURE:  [ ] LIMITED ECHO  [x] LIMITED CHEST  [ ] LIMITED RETROPERITONEAL  [ ] LIMITED ABDOMINAL  [ ] LIMITED DVT  [ ] NEEDLE GUIDANCE VASCULAR  [ ] NEEDLE GUIDANCE THORACENTESIS  [ ] NEEDLE GUIDANCE PARACENTESIS  [ ] NEEDLE GUIDANCE PERICARDIOCENTESIS  [ ] OTHER    FINDINGS: multiple B lines in bilateral lung fields.  Agree

## 2019-01-30 NOTE — PROGRESS NOTE ADULT - ASSESSMENT
54y Female PMHx HTN, T2DM, asthma, depression with recent diagnosis of invasive ampullary adenocarcinoma of the CBD admitted for cholangitis on 1/5, s/p ERCP 1/8, whipple 1/11, rapid response for GIB on floor 1/18 s/p MTP, RTOR s/p celiotomy, evacuation of hematoma, GDA bleed stopped and sutured, Abthera vac. Closure of abdomen with strattice mesh on 1/21, with inna drains and left lower JOURDAN drain in subcutaneous. Incisional bleed 1/23, bedside skin staples removed, JOURDAN drain removed, hemorrhage controlled, for continuation of ICU care.     Neuro:  -Tylenol & Oxy and dilaudid PRN for pain  -Haldol 2.5 once can give another 2.5 in 6 hours if needed  -Seroquel 25 to sleep     Resp:  -Extubated  -continue continuous pulse ox monitoring  -maintain O2 >94%    CV:  - vitals q1h, A line monitoring  -Continues to have hypertension- Gave lopressor and IV Labetalol  -Will start on labetalol 300 TID - Can give 1 more dose of IV before PO if needed    GI:  - CLD as tolerated  - TPN will be stopped as central line is removed  - Bowel regimen    Renal:  - Strict I&O, monitor lytes, replete as needed   - IVF on hold, receiving TPN       Endo:  - diabetic, FS q6h and low dose corrective regimen for hyperglycemia  - TPN with insulin     Heme:   -monitor H/H, stable   -ct lovenox vte ppx    ID:   - Vanc/Jose Carlos since 1/19       - Vanc trough prior to 3rd dose  - UA positive, urine cx 1/21 candida, micafungin since 1/23, stop on 2/1  - abdominal incision with external tissue expander device in place 1/24  - Can d/c antibiotics, will get CT scan to rule out abscess  -will d/c central line and stop TPN  -d/c A-line     Access: Right AC 18G extended dwell peripheral IV    Dispo: SICU    CC Diagnosis: Respiratory distress, fluid overload, malnutrition

## 2019-01-30 NOTE — PROGRESS NOTE ADULT - SUBJECTIVE AND OBJECTIVE BOX
HISTORY  54y Female PMHx HTN, T2DM, asthma, depression with recent diagnosis of invasive ampullary adenocarcinoma of the CBD admitted for cholangitis on 1/5, s/p ERCP 1/8, whipple 1/11, rapid response for GIB on floor 1/18 s/p MTP, RTOR s/p celiotomy, evacuation of hematoma, GDA bleed stopped and sutured, Abthera vac. Closure of abdomen with strattice mesh on 1/21, with inna drains and left lower JOURDAN drain in subcutaneous, s/p incisional bleed 1/23, bedside skin staples removed, JOURDAN drain removed, hemorrhage controlled, for continuation of ICU care for respiratory failure     24 HOUR EVENTS: Patient diuresed with 250 Diamox x1.  She was tolerating CPAP trail with RSBI < 60. Leak test performed, confirmed >50% leak with cuff deflated. Patient extubated without issue. NGT was removed. Patient started on clears, continue TPN while caloric intake is still low.      SUBJECTIVE/ROS:  [x ] A ten-point review of systems was otherwise negative except as noted.  [ ] Due to altered mental status/intubation, subjective information were not able to be obtained from the patient. History was obtained, to the extent possible, from review of the chart and collateral sources of information.      NEURO  RASS:     GCS:     CAM ICU:  Exam: awake, alert, oriented. following commands  Meds: HYDROmorphone  Injectable 0.5 milliGRAM(s) IV Push every 4 hours PRN Moderate Pain (4 - 6)  HYDROmorphone  Injectable 1 milliGRAM(s) IV Push every 4 hours PRN Severe Pain (7 - 10)  midazolam Injectable 2 milliGRAM(s) IV Push every 4 hours PRN agitation    [x] Adequacy of sedation and pain control has been assessed and adjusted      RESPIRATORY  RR: 28 (01-30-19 @ 02:00) (16 - 32)  SpO2: 94% (01-30-19 @ 02:00) (93% - 100%)  Wt(kg): --  Exam: unlabored, clear to auscultation bilaterally  Mechanical Ventilation: Mode: standby,pt extubated to 40% o2 via f/t  ABG - ( 29 Jan 2019 08:10 )  pH: 7.51  /  pCO2: 34    /  pO2: 77    / HCO3: 28    / Base Excess: 3.5   /  SaO2: 96.2    Lactate: 1.4        CARDIOVASCULAR  HR: 97 (01-30-19 @ 02:00) (60 - 112)  ABP: 168/72 (01-30-19 @ 02:00) (105/48 - 172/75)  ABP(mean): 112 (01-30-19 @ 02:00) (70 - 115)  Wt(kg): --  CVP(cm H2O): --    Exam: regular rate and rhythm  Cardiac Rhythm: sinus  Perfusion     [x]Adequate   [ ]Inadequate  Mentation   [x]Normal       [ ]Reduced  Extremities  [x]Warm         [ ]Cool  Volume Status [ ]Hypervolemic [x]Euvolemic [ ]Hypovolemic  Meds:       GI/NUTRITION  Exam: soft, distended, tender around  incisions. JOURDAN x2 with with bilious output, no rebound or guarding  Diet: CLD, TPN      GENITOURINARY  I&O's Detail    01-28 @ 07:01  -  01-29 @ 07:00  --------------------------------------------------------  IN:    bumetanide Infusion: 37.5 mL    dexmedetomidine Infusion: 195.8 mL    fat emulsion (Fish Oil and Plant Based) 20% Infusion: 150 mL    IV PiggyBack: 1150 mL    TPN (Total Parenteral Nutrition): 1909 mL  Total IN: 3442.3 mL    OUT:    Bulb: 70 mL    Bulb: 115 mL    Indwelling Catheter - Urethral: 23807 mL    Nasoenteral Tube: 250 mL  Total OUT: 08831 mL    Total NET: -7842.7 mL      01-29 @ 07:01 - 01-30 @ 03:01  --------------------------------------------------------  IN:    dexmedetomidine Infusion: 20 mL    fat emulsion (Fish Oil and Plant Based) 20% Infusion: 125 mL    IV PiggyBack: 500 mL    TPN (Total Parenteral Nutrition): 1577 mL  Total IN: 2222 mL    OUT:    Bulb: 155 mL    Bulb: 115 mL    Indwelling Catheter - Urethral: 2575 mL    Nasoenteral Tube: 100 mL  Total OUT: 2945 mL    Total NET: -723 mL          01-29    131<L>  |  94<L>  |  17  ----------------------------<  182<H>  3.4<L>   |  25  |  0.61    Ca    8.0<L>      29 Jan 2019 02:30  Phos  2.9     01-29  Mg     2.1     01-29    TPro  5.9<L>  /  Alb  2.0<L>  /  TBili  3.5<H>  /  DBili  2.5<H>  /  AST  119<H>  /  ALT  41<H>  /  AlkPhos  792<H>  01-29    [ x] Swanson catheter, indication: critically ill  Meds: fat emulsion (Fish Oil and Plant Based) 20% Infusion 12.5 mL/Hr IV Continuous <Continuous>  fat emulsion (Fish Oil and Plant Based) 20% Infusion 12.5 mL/Hr IV Continuous <Continuous>  Parenteral Nutrition - Adult 1 Each TPN Continuous <Continuous>        HEMATOLOGIC  Meds: enoxaparin Injectable 40 milliGRAM(s) SubCutaneous daily    [x] VTE Prophylaxis                        8.1    10.63 )-----------( 258      ( 29 Jan 2019 02:30 )             24.5       Transfusion     [ ] PRBC   [ ] Platelets   [ ] FFP   [ ] Cryoprecipitate      INFECTIOUS DISEASES  RECENT CULTURES:    Meds: meropenem  IVPB 1000 milliGRAM(s) IV Intermittent every 8 hours  meropenem  IVPB      micafungin IVPB      micafungin IVPB 100 milliGRAM(s) IV Intermittent every 24 hours  vancomycin  IVPB 1000 milliGRAM(s) IV Intermittent daily        ENDOCRINE  CAPILLARY BLOOD GLUCOSE      POCT Blood Glucose.: 162 mg/dL (29 Jan 2019 23:00)  POCT Blood Glucose.: 139 mg/dL (29 Jan 2019 18:08)  POCT Blood Glucose.: 159 mg/dL (29 Jan 2019 15:01)  POCT Blood Glucose.: 158 mg/dL (29 Jan 2019 11:41)  POCT Blood Glucose.: 171 mg/dL (29 Jan 2019 05:48)    Meds: insulin lispro (HumaLOG) corrective regimen sliding scale   SubCutaneous every 6 hours        ACCESS DEVICES:  [ x] Peripheral IV  [x ] Central Venous Line	[ ] R	[ ] L	[ ] IJ	[ ] Fem	[ ] SC	Placed:   [x ] Arterial Line		[ ] R	[ ] L	[ ] Fem	[ ] Rad	[ ] Ax	Placed:   [ ] PICC:					[ ] Mediport  [x ] Urinary Catheter, Date Placed:   [x] Necessity of urinary, arterial, and venous catheters discussed    OTHER MEDICATIONS:  artificial  tears Solution 1 Drop(s) Both EYES every 12 hours  chlorhexidine 0.12% Liquid 15 milliLiter(s) Oral Mucosa two times a day  chlorhexidine 4% Liquid 1 Application(s) Topical <User Schedule>  petrolatum Ophthalmic Ointment 1 Application(s) Both EYES daily      CODE STATUS: full code HISTORY  54y Female PMHx HTN, T2DM, asthma, depression with recent diagnosis of invasive ampullary adenocarcinoma of the CBD admitted for cholangitis on 1/5, s/p ERCP 1/8, whipple 1/11, rapid response for GIB on floor 1/18 s/p MTP, RTOR s/p celiotomy, evacuation of hematoma, GDA bleed stopped and sutured, Abthera vac. Closure of abdomen with strattice mesh on 1/21, with inna drains and left lower JOURDAN drain in subcutaneous, s/p incisional bleed 1/23, bedside skin staples removed, JOURDAN drain removed, hemorrhage controlled, for continuation of ICU care for respiratory failure     24 HOUR EVENTS: Patient diuresed with 250 Diamox x1.  She was tolerating CPAP trail with RSBI < 60. Leak test performed, confirmed >50% leak with cuff deflated. Patient extubated without issue. NGT was removed. Patient started on clears, continue TPN while caloric intake is still low.      SUBJECTIVE/ROS:  [x ] A ten-point review of systems was otherwise negative except as noted.  [ ] Due to altered mental status/intubation, subjective information were not able to be obtained from the patient. History was obtained, to the extent possible, from review of the chart and collateral sources of information.    Vital Signs Last 24 Hrs  T(C): 37.7 (30 Jan 2019 07:48), Max: 38.3 (29 Jan 2019 12:00)  T(F): 99.8 (30 Jan 2019 07:48), Max: 101 (29 Jan 2019 12:00)  HR: 94 (30 Jan 2019 08:00) (80 - 112)  BP: --  BP(mean): --  RR: 25 (30 Jan 2019 08:00) (22 - 32)  SpO2: 98% (30 Jan 2019 08:00) (93% - 99%)    I&O's Detail    29 Jan 2019 07:01  -  30 Jan 2019 07:00  --------------------------------------------------------  IN:    dexmedetomidine Infusion: 20 mL    fat emulsion (Fish Oil and Plant Based) 20% Infusion: 187.5 mL    IV PiggyBack: 750 mL    TPN (Total Parenteral Nutrition): 1992 mL  Total IN: 2949.5 mL    OUT:    Bulb: 235 mL    Bulb: 155 mL    Indwelling Catheter - Urethral: 3305 mL    Nasoenteral Tube: 100 mL  Total OUT: 3795 mL    Total NET: -845.5 mL      30 Jan 2019 07:01  -  30 Jan 2019 10:12  --------------------------------------------------------  IN:    TPN (Total Parenteral Nutrition): 83 mL  Total IN: 83 mL    OUT:    Indwelling Catheter - Urethral: 1525 mL  Total OUT: 1525 mL    Total NET: -1442 mL                                8.6    14.86 )-----------( 319      ( 30 Jan 2019 03:00 )             25.9       01-30    131<L>  |  99  |  15  ----------------------------<  168<H>  3.2<L>   |  22  |  0.49<L>    Ca    8.2<L>      30 Jan 2019 03:00  Phos  2.2     01-30  Mg     1.9     01-30    TPro  6.4  /  Alb  2.3<L>  /  TBili  4.4<H>  /  DBili  3.3<H>  /  AST  157<H>  /  ALT  58<H>  /  AlkPhos  978<H>  01-30      CAPILLARY BLOOD GLUCOSE      POCT Blood Glucose.: 170 mg/dL (30 Jan 2019 06:04)  POCT Blood Glucose.: 162 mg/dL (29 Jan 2019 23:00)  POCT Blood Glucose.: 139 mg/dL (29 Jan 2019 18:08)  POCT Blood Glucose.: 159 mg/dL (29 Jan 2019 15:01)  POCT Blood Glucose.: 158 mg/dL (29 Jan 2019 11:41)      LIVER FUNCTIONS - ( 30 Jan 2019 03:00 )  Alb: 2.3 g/dL / Pro: 6.4 g/dL / ALK PHOS: 978 u/L / ALT: 58 u/L / AST: 157 u/L / GGT: x                     NEURO  Exam: A&Ox2 following commands  Meds: HYDROmorphone  Injectable 0.5 milliGRAM(s) IV Push every 4 hours PRN Moderate Pain (4 - 6)  HYDROmorphone  Injectable 1 milliGRAM(s) IV Push every 4 hours PRN Severe Pain (7 - 10)  midazolam Injectable 2 milliGRAM(s) IV Push every 4 hours PRN agitation    [x] Adequacy of sedation and pain control has been assessed and adjusted      RESPIRATORY  RR: 28 (01-30-19 @ 02:00) (16 - 32)  SpO2: 94% (01-30-19 @ 02:00) (93% - 100%)  Wt(kg): --  Exam: unlabored, clear to auscultation bilaterally  Mechanical Ventilation: Mode: standby,pt extubated to 40% o2 via f/t  ABG - ( 29 Jan 2019 08:10 )  pH: 7.51  /  pCO2: 34    /  pO2: 77    / HCO3: 28    / Base Excess: 3.5   /  SaO2: 96.2    Lactate: 1.4        CARDIOVASCULAR  HR: 97 (01-30-19 @ 02:00) (60 - 112)  ABP: 168/72 (01-30-19 @ 02:00) (105/48 - 172/75)  ABP(mean): 112 (01-30-19 @ 02:00) (70 - 115)  Wt(kg): --  CVP(cm H2O): --    Exam: regular rate and rhythm  Cardiac Rhythm: sinus  Perfusion     [x]Adequate   [ ]Inadequate  Mentation   [x]Normal       [ ]Reduced  Extremities  [x]Warm         [ ]Cool  Volume Status [ ]Hypervolemic [x]Euvolemic [ ]Hypovolemic  Meds:       GI/NUTRITION  Exam: soft, distended, tender around  incisions. JOURDAN x2 with with bilious output, no rebound or guarding  Diet: CLD, TPN      GENITOURINARY  I&O's Detail    01-28 @ 07:01  -  01-29 @ 07:00  --------------------------------------------------------  IN:    bumetanide Infusion: 37.5 mL    dexmedetomidine Infusion: 195.8 mL    fat emulsion (Fish Oil and Plant Based) 20% Infusion: 150 mL    IV PiggyBack: 1150 mL    TPN (Total Parenteral Nutrition): 1909 mL  Total IN: 3442.3 mL    OUT:    Bulb: 70 mL    Bulb: 115 mL    Indwelling Catheter - Urethral: 90844 mL    Nasoenteral Tube: 250 mL  Total OUT: 37787 mL    Total NET: -7842.7 mL      01-29 @ 07:01  -  01-30 @ 03:01  --------------------------------------------------------  IN:    dexmedetomidine Infusion: 20 mL    fat emulsion (Fish Oil and Plant Based) 20% Infusion: 125 mL    IV PiggyBack: 500 mL    TPN (Total Parenteral Nutrition): 1577 mL  Total IN: 2222 mL    OUT:    Bulb: 155 mL    Bulb: 115 mL    Indwelling Catheter - Urethral: 2575 mL    Nasoenteral Tube: 100 mL  Total OUT: 2945 mL    Total NET: -723 mL          01-29    131<L>  |  94<L>  |  17  ----------------------------<  182<H>  3.4<L>   |  25  |  0.61    Ca    8.0<L>      29 Jan 2019 02:30  Phos  2.9     01-29  Mg     2.1     01-29    TPro  5.9<L>  /  Alb  2.0<L>  /  TBili  3.5<H>  /  DBili  2.5<H>  /  AST  119<H>  /  ALT  41<H>  /  AlkPhos  792<H>  01-29    [ x] Swanson catheter, indication: critically ill  Meds: fat emulsion (Fish Oil and Plant Based) 20% Infusion 12.5 mL/Hr IV Continuous <Continuous>  fat emulsion (Fish Oil and Plant Based) 20% Infusion 12.5 mL/Hr IV Continuous <Continuous>  Parenteral Nutrition - Adult 1 Each TPN Continuous <Continuous>        HEMATOLOGIC  Meds: enoxaparin Injectable 40 milliGRAM(s) SubCutaneous daily    [x] VTE Prophylaxis                        8.1    10.63 )-----------( 258      ( 29 Jan 2019 02:30 )             24.5       Transfusion     [ ] PRBC   [ ] Platelets   [ ] FFP   [ ] Cryoprecipitate      INFECTIOUS DISEASES  RECENT CULTURES:    Meds: meropenem  IVPB 1000 milliGRAM(s) IV Intermittent every 8 hours  meropenem  IVPB      micafungin IVPB      micafungin IVPB 100 milliGRAM(s) IV Intermittent every 24 hours  vancomycin  IVPB 1000 milliGRAM(s) IV Intermittent daily        ENDOCRINE  CAPILLARY BLOOD GLUCOSE      POCT Blood Glucose.: 162 mg/dL (29 Jan 2019 23:00)  POCT Blood Glucose.: 139 mg/dL (29 Jan 2019 18:08)  POCT Blood Glucose.: 159 mg/dL (29 Jan 2019 15:01)  POCT Blood Glucose.: 158 mg/dL (29 Jan 2019 11:41)  POCT Blood Glucose.: 171 mg/dL (29 Jan 2019 05:48)    Meds: insulin lispro (HumaLOG) corrective regimen sliding scale   SubCutaneous every 6 hours        ACCESS DEVICES:  [ x] Peripheral IV  [x ] Central Venous Line	[ ] R	[ ] L	[ ] IJ	[ ] Fem	[ ] SC	Placed:   [x ] Arterial Line		[ ] R	[ ] L	[ ] Fem	[ ] Rad	[ ] Ax	Placed:   [ ] PICC:					[ ] Mediport  [x ] Urinary Catheter, Date Placed:   [x] Necessity of urinary, arterial, and venous catheters discussed    OTHER MEDICATIONS:  artificial  tears Solution 1 Drop(s) Both EYES every 12 hours  chlorhexidine 0.12% Liquid 15 milliLiter(s) Oral Mucosa two times a day  chlorhexidine 4% Liquid 1 Application(s) Topical <User Schedule>  petrolatum Ophthalmic Ointment 1 Application(s) Both EYES daily      CODE STATUS: full code

## 2019-01-30 NOTE — PROGRESS NOTE ADULT - ATTENDING COMMENTS
54y Female PMHx HTN, T2DM, asthma, depression with recent diagnosis of invasive ampullary adenocarcinoma of the CBD admitted for cholangitis on 1/5, s/p ERCP 1/8, whipple 1/11, rapid response for GIB on floor 1/18 s/p MTP, RTOR s/p celiotomy, evacuation of hematoma, GDA bleed stopped and sutured, Abthera vac. Closure of abdomen with strattice mesh on 1/21, with nina drains and left lower JOURDAN drain in subcutaneous. Incisional bleed 1/23, bedside skin staples removed, JOURDAN drain removed, hemorrhage controlled, for continuation of ICU care.     Neuro:  -Tylenol & Oxy and dilaudid PRN for pain  -Haldol 2.5 once can give another 2.5 in 6 hours if needed  -Seroquel 25 to sleep     Resp:  -Extubated  -continue continuous pulse ox monitoring  -maintain O2 >94%    CV:  - vitals q1h, A line monitoring  -Continues to have hypertension- Gave lopressor and IV Labetalol  -Will start on labetalol 300 TID - Can give 1 more dose of IV before PO if needed    GI:  - CLD as tolerated  - TPN will be stopped as central line will be taken out  - Bowel regimen    Renal:  - Strict I&O, monitor lytes, replete as needed   - IVF on hold, receiving TPN       Endo:  - diabetic, FS q6h and low dose corrective regimen for hyperglycemia  - TPN with insulin     Heme:   -monitor H/H, stable   -ct lovenox vte ppx    ID:   - Vanc/Jose Carlos since 1/19       - Vanc trough prior to 3rd dose  - UA positive, urine cx 1/21 candida, micafungin since 1/23, stop on 2/1  - abdominal incision with external tissue expander device in place 1/24  - Can d/c antibiotics, will get CT scan to look for abscess  -will d/c central line and stop TPN  -d/c A-line    Access: [facility] central line, R axillary arterial line    Dispo: SICU    CC Diagnosis: Respiratory distress, fluid overload, malnutrition  The patient is a critical care patient with life threatening hemodynamic and metabolic instability in SICU.  I have personally interviewed when possible and examined the patient, reviewed data and laboratory tests/x-rays and all pertinent electronic images.  I was physically present for the key portions of the evaluation and management (E/M) service provided.   The SICU team has a constant risk benefit analyzes discussion with the primary team, all consultants, House Staff and PA's on all decisions.  These diagnoses are unrelated to the surgical procedure noted above.  I meet with family if needed to get further history, discuss the case and make care decisions for this patient who might not be able to participate.  Time involved in performance of separately billable procedures was not counted toward my critical care time. There is no overlap.  I spent 55-75 minutes of critical care time for the diagnoses, assessment, plan and interventions.

## 2019-01-30 NOTE — PROGRESS NOTE ADULT - SUBJECTIVE AND OBJECTIVE BOX
NUTRITION NOTE  [id number] SOUSA  ===============================    Interval events: Patient extubated yesterday. NGT removed and patient was started on clears. Patient c/o left sided abdominal pain. Also advised by nurse that patient is very fidgety today.    VITAL SIGNS:  T(C): 37.7 (19 @ 07:48), Max: 38.3 (19 @ 12:00)  HR: 94 (19 @ 08:00) (80 - 112)  BP: --  ABP: 177/88 (19 @ 08:00) (120/63 - 190/88)  ABP(mean): 127 (19 @ 08:00) (87 - 133)  RR: 25 (19 @ 08:00) (22 - 32)  SpO2: 98% (19 @ 08:00) (93% - 99%)  CVP(mm Hg): --   @ 07:  -   @ 07:00  --------------------------------------------------------  IN: 2949.5 mL / OUT: 3795 mL / NET: -845.5 mL     @ 07: @ 10:19  --------------------------------------------------------  IN: 83 mL / OUT: 1525 mL / NET: -1442 mL    Glucose... 139-170    Neuro: Patient awake, responsive    CV: S1, S2 RRR    Pulm: Coarse b/s b/l    GI/Nutrition: moderately distended, but softer, Drains in place with bilious drainage, dressing C/D/I, binder in place    Extremity: + edema, but less than before    PICC Site: C/D/I    Metabolic/FLUIDS/ELECTROLYTES/NUTRITION:  Gastrointestinal Medications:  fat emulsion (Fish Oil and Plant Based) 20% Infusion 12.5 mL/Hr IV Continuous <Continuous>  fat emulsion (Fish Oil and Plant Based) 20% Infusion 12.5 mL/Hr IV Continuous <Continuous>  fat emulsion (Fish Oil and Plant Based) 20% Infusion 12.5 mL/Hr IV Continuous <Continuous>  Parenteral Nutrition - Adult 1 Each TPN Continuous <Continuous>  Parenteral Nutrition - Adult 1 Each TPN Continuous <Continuous>    I&O's Detail  54y  Daily Weight in k.3 (2019 07:00)      131<L>  |  99  |  15  ----------------------------<  168<H>  3.2<L>   |  22  |  0.49<L>    Ca    8.2<L>      2019 03:00  Phos  2.2       Mg     1.9         TPro  6.4  /  Alb  2.3<L>  /  TBili  4.4<H>  /  DBili  3.3<H>  /  AST  157<H>  /  ALT  58<H>  /  AlkPhos  978<H>      Diet: Clears/TPN    INFECTIOUS DISEASE:  Antimicrobials/Immunologic Medications:  meropenem  IVPB 1000 milliGRAM(s) IV Intermittent every 8 hours  meropenem  IVPB      micafungin IVPB      micafungin IVPB 100 milliGRAM(s) IV Intermittent every 24 hours  vancomycin  IVPB 1000 milliGRAM(s) IV Intermittent daily    RECENT CULTURES:    OTHER MEDICATIONS:  Endocrine/Metabolic Medications:  insulin lispro (HumaLOG) corrective regimen sliding scale   SubCutaneous every 6 hours    Genitourinary Medications:    Topical/Other Medications:  artificial  tears Solution 1 Drop(s) Both EYES every 12 hours  chlorhexidine 4% Liquid 1 Application(s) Topical <User Schedule>    ASSESSMENT/PLAN:  54y Female PMHx HTN, T2DM, asthma, depression with recent diagnosis of invasive ampullary adenocarcinoma of the CBD whom underwent an uneventful whipple 18. Patient has had an uptrending WBC since procedure, initially attributed to not restarting antibiotics for cholangitis, but experienced abdominal tenderness/distention and tachycardia yesterday and was transferred to SICU for close hemodynamic monitoring. Now s/p Celiotomy, evacuation of hematoma, cessation of gastroduodenal artery bleeding, and placement of Abthera vac . now s/p RTOR for abdominal washout     Continue TPN, clears  Repleted lytes (NaCl, Potassium and Magnesium)\  FS improved.  Kept Insulin at 23 Units    1.  Protein calorie malnutrition being optimized with TPN: CHO [180 ] gm.  AA [70 ] gm. Lipids [30 ] gm.  2.  Hyperglycemia managed with: [23 ] units of regular insulin    3.  Check fluid balance daily.  Strict I/O  [ ] [ ]   4.  Daily BMP, Ionized Calcium, Magnesium and Phosphorous   5.  Triglycerides at initiation of TPN and monthly [ ] [ ]   6.  Pepcid in TPN for GI prophylaxis  [ ]     Nutrition Support [id number]

## 2019-01-30 NOTE — PHYSICAL THERAPY INITIAL EVALUATION ADULT - ADDITIONAL COMMENTS
Pt. owns DME of rolling walker.     Pt. was left seated in chair post PT Re-Evaluation, NAD, all lines/devices intact, daughter present. RN Leah present and aware of pt. status/participation in PT.
Patient lives with daughter in house, 10 stairs, ambulates with a walker.

## 2019-01-30 NOTE — PROGRESS NOTE ADULT - SUBJECTIVE AND OBJECTIVE BOX
General Surgery Progress Note    SUBJECTIVE:  The patient was seen and examined. No acute events overnight. Diuresed once w/ 250 Diamox. Extubated and now on NC. NGT removed. Started on clears, tolerating. Pain well controlled.    OBJECTIVE:     ** VITAL SIGNS / I&O's **    Vital Signs Last 24 Hrs  T(C): 37.5 (30 Jan 2019 04:00), Max: 38.3 (29 Jan 2019 12:00)  T(F): 99.5 (30 Jan 2019 04:00), Max: 101 (29 Jan 2019 12:00)  HR: 85 (30 Jan 2019 06:00) (66 - 112)  BP: --  BP(mean): --  RR: 24 (30 Jan 2019 06:00) (16 - 32)  SpO2: 95% (30 Jan 2019 06:00) (93% - 100%)  Mode: standby,pt extubated to 40% o2 via f/t      28 Jan 2019 07:01  -  29 Jan 2019 07:00  --------------------------------------------------------  IN:    bumetanide Infusion: 37.5 mL    dexmedetomidine Infusion: 195.8 mL    fat emulsion (Fish Oil and Plant Based) 20% Infusion: 150 mL    IV PiggyBack: 1150 mL    TPN (Total Parenteral Nutrition): 1909 mL  Total IN: 3442.3 mL    OUT:    Bulb: 70 mL    Bulb: 115 mL    Indwelling Catheter - Urethral: 72576 mL    Nasoenteral Tube: 250 mL  Total OUT: 83111 mL    Total NET: -7842.7 mL      29 Jan 2019 07:01  -  30 Jan 2019 06:56  --------------------------------------------------------  IN:    dexmedetomidine Infusion: 20 mL    fat emulsion (Fish Oil and Plant Based) 20% Infusion: 187.5 mL    IV PiggyBack: 500 mL    TPN (Total Parenteral Nutrition): 1992 mL  Total IN: 2699.5 mL    OUT:    Bulb: 235 mL    Bulb: 155 mL    Indwelling Catheter - Urethral: 3305 mL    Nasoenteral Tube: 100 mL  Total OUT: 3795 mL    Total NET: -1095.5 mL          ** PHYSICAL EXAM **    -- CONSTITUTIONAL: Alert, NAD  -- PULMONARY: non-labored respirations  -- ABDOMEN: soft, ND, midline incision re-packed, LLQ and RUQ JOURDAN bilious, no rebound or guarding    ** LABS **                          8.6    14.86 )-----------( 319      ( 30 Jan 2019 03:00 )             25.9     30 Jan 2019 03:00    131    |  99     |  15     ----------------------------<  168    3.2     |  22     |  0.49     Ca    8.2        30 Jan 2019 03:00  Phos  2.2       30 Jan 2019 03:00  Mg     1.9       30 Jan 2019 03:00    TPro  6.4    /  Alb  2.3    /  TBili  4.4    /  DBili  3.3    /  AST  157    /  ALT  58     /  AlkPhos  978    30 Jan 2019 03:00      CAPILLARY BLOOD GLUCOSE      POCT Blood Glucose.: 170 mg/dL (30 Jan 2019 06:04)  POCT Blood Glucose.: 162 mg/dL (29 Jan 2019 23:00)  POCT Blood Glucose.: 139 mg/dL (29 Jan 2019 18:08)  POCT Blood Glucose.: 159 mg/dL (29 Jan 2019 15:01)  POCT Blood Glucose.: 158 mg/dL (29 Jan 2019 11:41)        LIVER FUNCTIONS - ( 30 Jan 2019 03:00 )  Alb: 2.3 g/dL / Pro: 6.4 g/dL / ALK PHOS: 978 u/L / ALT: 58 u/L / AST: 157 u/L / GGT: x                 MEDICATIONS  (STANDING):  artificial  tears Solution 1 Drop(s) Both EYES every 12 hours  chlorhexidine 0.12% Liquid 15 milliLiter(s) Oral Mucosa two times a day  chlorhexidine 4% Liquid 1 Application(s) Topical <User Schedule>  enoxaparin Injectable 40 milliGRAM(s) SubCutaneous daily  fat emulsion (Fish Oil and Plant Based) 20% Infusion 12.5 mL/Hr (12.5 mL/Hr) IV Continuous <Continuous>  fat emulsion (Fish Oil and Plant Based) 20% Infusion 12.5 mL/Hr (12.5 mL/Hr) IV Continuous <Continuous>  insulin lispro (HumaLOG) corrective regimen sliding scale   SubCutaneous every 6 hours  labetalol Injectable 10 milliGRAM(s) IV Push once  meropenem  IVPB 1000 milliGRAM(s) IV Intermittent every 8 hours  meropenem  IVPB      micafungin IVPB      micafungin IVPB 100 milliGRAM(s) IV Intermittent every 24 hours  Parenteral Nutrition - Adult 1 Each (83 mL/Hr) TPN Continuous <Continuous>  petrolatum Ophthalmic Ointment 1 Application(s) Both EYES daily  vancomycin  IVPB 1000 milliGRAM(s) IV Intermittent daily    MEDICATIONS  (PRN):  HYDROmorphone  Injectable 0.5 milliGRAM(s) IV Push every 4 hours PRN Moderate Pain (4 - 6)  HYDROmorphone  Injectable 1 milliGRAM(s) IV Push every 4 hours PRN Severe Pain (7 - 10)  midazolam Injectable 2 milliGRAM(s) IV Push every 4 hours PRN agitation

## 2019-01-30 NOTE — PHYSICAL THERAPY INITIAL EVALUATION ADULT - PERTINENT HX OF CURRENT PROBLEM, REHAB EVAL
Pt. admitted for cholangitis. Per documentation, pt. s/p ERCP 1/8, whipple 1/11, rapid response for GIB on floor 1/18 s/p MTP, RTOR s/p celiotomy, evacuation of hematoma, GDA bleed stopped and sutured, Abthera vac. Closure of abdomen with strattice mesh on 1/21.

## 2019-01-30 NOTE — PHYSICAL THERAPY INITIAL EVALUATION ADULT - CRITERIA FOR SKILLED THERAPEUTIC INTERVENTIONS
functional limitations in following categories/impairments found
risk reduction/prevention/rehab potential/therapy frequency/impairments found/anticipated discharge recommendation/predicted duration of therapy intervention

## 2019-01-30 NOTE — PROGRESS NOTE ADULT - ASSESSMENT
54y Female PMHx HTN, T2DM, asthma, depression with recent diagnosis of invasive ampullary adenocarcinoma of the CBD admitted for cholangitis on 1/5, s/p ERCP 1/8, whipple 1/11, rapid response for GIB on floor 1/18 s/p MTP, RTOR s/p celiotomy, evacuation of hematoma, GDA bleed stopped and sutured, Abthera vac. Closure of abdomen with strattice mesh on 1/21, with inna drains and left lower JOURDAN drain in subcutaneous. Incisional bleed 1/23, bedside skin staples removed, JOURDAN drain removed, hemorrhage controlled, for continuation of ICU care.     Neuro:  -Tylenol and dilaudid PRN for pain  -Versed PRN     Resp:  -Extubated  -continue continuous pulse ox monitoring  -maintain O2 >94%    CV:  - vitals q1h, A line monitoring    GI:  - CLD as tolerated  - ct TPN through dedicated port  - daily electrolyte with ionized Ca as on TPN  - Bowel regimen    Renal:  - Strict I&O, monitor lytes, replete as needed  - IVF on hold, receiving TPN       Endo:  - diabetic, FS q6h and low dose corrective regimen for hyperglycemia  - TPN with insulin     Heme:   -monitor H/H, stable   -ct lovenox vte ppx    ID:   - Vanc/Jose Carlos since 1/19       - Vanc trough prior to 3rd dose  - UA positive, urine cx 1/21 candida, micafungin since 1/23, stop on 2/1  - abdominal incision with external tissue expander device in place 1/24    Access: LIJ central line, R axillary arterial line    Dispo: SICU    CC Diagnosis: Respiratory distress, fluid overload, malnutrition 54y Female PMHx HTN, T2DM, asthma, depression with recent diagnosis of invasive ampullary adenocarcinoma of the CBD admitted for cholangitis on 1/5, s/p ERCP 1/8, whipple 1/11, rapid response for GIB on floor 1/18 s/p MTP, RTOR s/p celiotomy, evacuation of hematoma, GDA bleed stopped and sutured, Abthera vac. Closure of abdomen with strattice mesh on 1/21, with inna drains and left lower JOURDAN drain in subcutaneous. Incisional bleed 1/23, bedside skin staples removed, JOURDAN drain removed, hemorrhage controlled, for continuation of ICU care.     Neuro:  -Tylenol & Oxy and dilaudid PRN for pain  -Haldol 2.5 once can give another 2.5 in 6 hours if needed  -Seroquel 25 to sleep     Resp:  -Extubated  -continue continuous pulse ox monitoring  -maintain O2 >94%    CV:  - vitals q1h, A line monitoring  -Continues to have hypertension- Gave lopressor and IV Labetalol  -Will start on labetalol 300 TID - Can give 1 more dose of IV before PO if needed    GI:  - CLD as tolerated  - ct TPN through dedicated port  - daily electrolyte with ionized Ca as on TPN  - Bowel regimen    Renal:  - Strict I&O, monitor lytes, replete as needed   - IVF on hold, receiving TPN       Endo:  - diabetic, FS q6h and low dose corrective regimen for hyperglycemia  - TPN with insulin     Heme:   -monitor H/H, stable   -ct lovenox vte ppx    ID:   - Vanc/Jose Carlos since 1/19       - Vanc trough prior to 3rd dose  - UA positive, urine cx 1/21 candida, micafungin since 1/23, stop on 2/1  - abdominal incision with external tissue expander device in place 1/24  - Can d/c antibiotics - if she spikes a fever we will do body CT Scan to search for source of infection/abscess    Access: LIJ central line, R axillary arterial line    Dispo: SICU    CC Diagnosis: Respiratory distress, fluid overload, malnutrition 54y Female PMHx HTN, T2DM, asthma, depression with recent diagnosis of invasive ampullary adenocarcinoma of the CBD admitted for cholangitis on 1/5, s/p ERCP 1/8, whipple 1/11, rapid response for GIB on floor 1/18 s/p MTP, RTOR s/p celiotomy, evacuation of hematoma, GDA bleed stopped and sutured, Abthera vac. Closure of abdomen with strattice mesh on 1/21, with inna drains and left lower JOURDAN drain in subcutaneous. Incisional bleed 1/23, bedside skin staples removed, JOURDAN drain removed, hemorrhage controlled, for continuation of ICU care.     Neuro:  -Tylenol & Oxy and dilaudid PRN for pain  -Haldol 2.5 once can give another 2.5 in 6 hours if needed  -Seroquel 25 to sleep     Resp:  -Extubated  -continue continuous pulse ox monitoring  -maintain O2 >94%    CV:  - vitals q1h, A line monitoring  -Continues to have hypertension- Gave lopressor and IV Labetalol  -Will start on labetalol 300 TID - Can give 1 more dose of IV before PO if needed    GI:  - CLD as tolerated  - TPN will be stopped as central line will be taken out  - Bowel regimen    Renal:  - Strict I&O, monitor lytes, replete as needed   - IVF on hold, receiving TPN       Endo:  - diabetic, FS q6h and low dose corrective regimen for hyperglycemia  - TPN with insulin     Heme:   -monitor H/H, stable   -ct lovenox vte ppx    ID:   - Vanc/Jose Carlos since 1/19       - Vanc trough prior to 3rd dose  - UA positive, urine cx 1/21 candida, micafungin since 1/23, stop on 2/1  - abdominal incision with external tissue expander device in place 1/24  - Can d/c antibiotics, will get Full body CT scan to look for abscess  -will d/c central line and stop TPN    Access: LIJ central line, R axillary arterial line    Dispo: SICU    CC Diagnosis: Respiratory distress, fluid overload, malnutrition 54y Female PMHx HTN, T2DM, asthma, depression with recent diagnosis of invasive ampullary adenocarcinoma of the CBD admitted for cholangitis on 1/5, s/p ERCP 1/8, whipple 1/11, rapid response for GIB on floor 1/18 s/p MTP, RTOR s/p celiotomy, evacuation of hematoma, GDA bleed stopped and sutured, Abthera vac. Closure of abdomen with strattice mesh on 1/21, with inna drains and left lower JOURDAN drain in subcutaneous. Incisional bleed 1/23, bedside skin staples removed, JOURDAN drain removed, hemorrhage controlled, for continuation of ICU care.     Neuro:  -Tylenol & Oxy and dilaudid PRN for pain  -Haldol 2.5 once can give another 2.5 in 6 hours if needed  -Seroquel 25 to sleep     Resp:  -Extubated  -continue continuous pulse ox monitoring  -maintain O2 >94%    CV:  - vitals q1h, A line monitoring  -Continues to have hypertension- Gave lopressor and IV Labetalol  -Will start on labetalol 300 TID - Can give 1 more dose of IV before PO if needed    GI:  - CLD as tolerated  - TPN will be stopped as central line will be taken out  - Bowel regimen    Renal:  - Strict I&O, monitor lytes, replete as needed   - IVF on hold, receiving TPN       Endo:  - diabetic, FS q6h and low dose corrective regimen for hyperglycemia  - TPN with insulin     Heme:   -monitor H/H, stable   -ct lovenox vte ppx    ID:   - Vanc/Jose Carlos since 1/19       - Vanc trough prior to 3rd dose  - UA positive, urine cx 1/21 candida, micafungin since 1/23, stop on 2/1  - abdominal incision with external tissue expander device in place 1/24  - Can d/c antibiotics, will get CT scan to look for abscess  -will d/c central line and stop TPN  -d/c A-line    Access: LIJ central line, R axillary arterial line    Dispo: SICU    CC Diagnosis: Respiratory distress, fluid overload, malnutrition

## 2019-01-30 NOTE — PHYSICAL THERAPY INITIAL EVALUATION ADULT - IMPAIRMENTS FOUND, PT EVAL
aerobic capacity/endurance/gait, locomotion, and balance/muscle strength
muscle strength/gait, locomotion, and balance

## 2019-01-30 NOTE — PHYSICAL THERAPY INITIAL EVALUATION ADULT - GENERAL OBSERVATIONS, REHAB EVAL
Consult received, chart reviewed. Patient received supine in bed, NAD, +cardiac monitor, daughter present. Patient agreed to Re-Evaluation from Physical Therapist.

## 2019-01-30 NOTE — PROGRESS NOTE ADULT - ATTENDING COMMENTS
54y Female PMHx HTN, T2DM, asthma, depression with recent diagnosis of invasive ampullary adenocarcinoma of the CBD whom underwent an uneventful whipple 1/11/18. Patient has had an uptrending WBC since procedure, initially attributed to not restarting antibiotics for cholangitis, but experienced abdominal tenderness/distention and tachycardia yesterday and was transferred to SICU for close hemodynamic monitoring. Now s/p Celiotomy, evacuation of hematoma, cessation of gastroduodenal artery bleeding, and placement of Abthera vac 1/18. now s/p RTOR for abdominal washout 1/21    Continue TPN, clears  Repleted lytes (NaCl, Potassium and Magnesium)\  FS improved.  Kept Insulin at 23 Units    1.  Protein calorie malnutrition being optimized with TPN: CHO [180 ] gm.  AA [70 ] gm. Lipids [30 ] gm.  2.  Hyperglycemia managed with: [23 ] units of regular insulin    3.  Check fluid balance daily.  Strict I/O  [ ] [ ]   4.  Daily BMP, Ionized Calcium, Magnesium and Phosphorous   5.  Triglycerides at initiation of TPN and monthly [ ] [ ]   6.  Pepcid in TPN for GI prophylaxis  [ ]     I have seen and examined the patient, reviewed the laboratory and radiologic data and agree with the history, physical assessment and plan.  I reviewed and discussed with all consultants, house staff and PA's.  The note is edited where appropriate. The Nutrition Support Team (NST) discusses on an ongoing basis with the primary team and all consultants, House staff and PA's to have a permanent risk benefit analyses on all decisions.    I spent  45  minutes in total encounter; more than 50% of the visit counseling and coordinating nutrition care while providing diagnoses, assessment, and plan.

## 2019-01-30 NOTE — PROGRESS NOTE ADULT - ASSESSMENT
54y Female PMHx HTN, T2DM, asthma, depression with recent diagnosis of invasive ampullary adenocarcinoma of the CBD admitted for cholangitis on 1/5, s/p ERCP 1/8, whipple 1/11, rapid response for GIB on floor 1/18 s/p MTP, RTOR s/p celiotomy, evacuation of hematoma, GDA bleed stopped and sutured, Abthera vac. Closure of abdomen with strattice mesh on 1/21, with inna drains and left lower JOURDAN drain in subcutaneous. Incisional bleed 1/23, bedside skin staples removed, JOURDAN drain removed, hemorrhage controlled, for continuation of ICU care.     PLAN:  -continue care per sicu  -pain control as needed  -lovenox daily for dvt ppx  -PT/oob  -cont clears 54y Female PMHx HTN, T2DM, asthma, depression with recent diagnosis of invasive ampullary adenocarcinoma of the CBD admitted for cholangitis on 1/5, s/p ERCP 1/8, whipple 1/11, rapid response for GIB on floor 1/18 s/p MTP, RTOR s/p celiotomy, evacuation of hematoma, GDA bleed stopped and sutured, Abthera vac. Closure of abdomen with strattice mesh on 1/21, with inna drains and left lower JOURDAN drain in subcutaneous. Incisional bleed 1/23, bedside skin staples removed, JOURDAN drain removed, hemorrhage controlled, for continuation of ICU care.     PLAN:  -continue care per sicu  -pain control as needed  -lovenox daily for dvt ppx  -PT/oob  -cont clears  -cont abx  -obtain cultures as soon as possible if febrile

## 2019-01-30 NOTE — PROGRESS NOTE ADULT - SUBJECTIVE AND OBJECTIVE BOX
SICU PM PROGRESS NOTE  ===============================  Interval Events: Patient s/p removal of LIJ line and right axillary arterial line. Right AC extended dwell peripheral IV placed under US guidance. Patient s/p CT A/P, read pending. Patient given haldol 2.5 for delirium, started on seroquel 25 qhs. Started on po labetalol for persistent HTN.     HPI  54y Female PMHx HTN, T2DM, asthma, depression with recent diagnosis of invasive ampullary adenocarcinoma of the CBD admitted for cholangitis on 1/5, s/p ERCP 1/8, whipple 1/11, rapid response for GIB on floor 1/18 s/p MTP, RTOR s/p celiotomy, evacuation of hematoma, GDA bleed stopped and sutured, Abthera vac. Closure of abdomen with strattice mesh on 1/21, with inna drains and left lower JOURDAN drain in subcutaneous, s/p incisional bleed 1/23, bedside skin staples removed, JOURDAN drain removed, hemorrhage controlled, for continuation of ICU care for respiratory failure     VITAL SIGNS, INS/OUTS  ICU Vital Signs Last 24 Hrs  T(C): 37.2 (30 Jan 2019 12:00), Max: 38.3 (30 Jan 2019 00:00)  T(F): 99 (30 Jan 2019 12:00), Max: 100.9 (30 Jan 2019 00:00)  HR: 93 (30 Jan 2019 12:00) (85 - 112)  BP: --  BP(mean): --  ABP: 160/68 (30 Jan 2019 12:00) (120/63 - 190/88)  ABP(mean): 107 (30 Jan 2019 12:00) (87 - 133)  RR: 32 (30 Jan 2019 12:00) (23 - 32)  SpO2: 99% (30 Jan 2019 12:00) (93% - 99%)    --------------------------------------------------------------------------------------  I&O's Detail    29 Jan 2019 07:01  -  30 Jan 2019 07:00  --------------------------------------------------------  IN:    dexmedetomidine Infusion: 20 mL    fat emulsion (Fish Oil and Plant Based) 20% Infusion: 187.5 mL    IV PiggyBack: 750 mL    TPN (Total Parenteral Nutrition): 1992 mL  Total IN: 2949.5 mL    OUT:    Bulb: 235 mL    Bulb: 155 mL    Indwelling Catheter - Urethral: 3305 mL    Nasoenteral Tube: 100 mL  Total OUT: 3795 mL    Total NET: -845.5 mL      30 Jan 2019 07:01  -  30 Jan 2019 14:04  --------------------------------------------------------  IN:    dextrose 10%.: 225 mL    TPN (Total Parenteral Nutrition): 332 mL  Total IN: 557 mL    OUT:    Indwelling Catheter - Urethral: 1875 mL  Total OUT: 1875 mL    Total NET: -1318 mL        --------------------------------------------------------------------------------------    EXAM  NEUROLOGY  RASS: 0 - -2  Exam: NAD, Awake and alert, fluctuating orientation  HEENT  Exam: Normocephalic, atraumatic, EOMI  RESPIRATORY  Exam: Lungs clear to auscultation, Normal expansion/effort. Saturating adequately on NC  CARDIOVASCULAR  Exam: S1, S2.  Regular rate and rhythm. + Peripheral edema   GI/NUTRITION  Exam: Abdomen soft, Non-tender, Non-distended. JOURDAN with bilious output, LLQ JOURDAN with dark/serous output, no rebound or guarding  Current Diet:   VASCULAR  Exam: Extremities warm, pink, well-perfused.   MUSCULOSKELETAL  Exam: All extremities moving spontaneously without limitations.   SKIN  Exam: Good skin turgor, no skin breakdown.     METABOLIC/FLUIDS/ELECTROLYTES  dextrose 10%. 1000 milliLiter(s) IV Continuous <Continuous>      HEMATOLOGIC  [x] DVT Prophylaxis: enoxaparin Injectable 40 milliGRAM(s) SubCutaneous daily    Transfusions:	[] PRBC	[] Platelets		[] FFP	[] Cryoprecipitate    INFECTIOUS DISEASE  Antimicrobials/Immunologic Medications:  Vancomycin, Meropenem, Micafungin    LABS  --------------------------------------------------------------------------------------                        8.6    14.86 )-----------( 319      ( 30 Jan 2019 03:00 )             25.9     --------------------------------------------------------------------------------------    IMAGING STUDIES:  CT A/P pending official read    Disposition:  SICU

## 2019-01-30 NOTE — PROGRESS NOTE ADULT - ATTENDING COMMENTS
54y Female PMHx HTN, T2DM, asthma, depression with recent diagnosis of invasive ampullary adenocarcinoma of the CBD admitted for cholangitis on 1/5, s/p ERCP 1/8, whipple 1/11, rapid response for GIB on floor 1/18 s/p MTP, RTOR s/p celiotomy, evacuation of hematoma, GDA bleed stopped and sutured, Abthera vac. Closure of abdomen with strattice mesh on 1/21, with inna drains and left lower JOURDAN drain in subcutaneous. Incisional bleed 1/23, bedside skin staples removed, JOURDAN drain removed, hemorrhage controlled, for continuation of ICU care.     Neuro:  -Tylenol & Oxy and dilaudid PRN for pain  -Haldol 2.5 once can give another 2.5 in 6 hours if needed  -Seroquel 25 to sleep     Resp:  -Extubated  -continue continuous pulse ox monitoring  -maintain O2 >94%    CV:  - vitals q1h, A line monitoring  -Continues to have hypertension- Gave lopressor and IV Labetalol  -Will start on labetalol 300 TID - Can give 1 more dose of IV before PO if needed    GI:  - CLD as tolerated  - TPN will be stopped as central line is removed  - Bowel regimen    Renal:  - Strict I&O, monitor lytes, replete as needed   - IVF on hold, receiving TPN       Endo:  - diabetic, FS q6h and low dose corrective regimen for hyperglycemia  - TPN with insulin     Heme:   -monitor H/H, stable   -ct lovenox vte ppx    ID:   - Vanc/Jose Carlos since 1/19       - Vanc trough prior to 3rd dose  - UA positive, urine cx 1/21 candida, micafungin since 1/23, stop on 2/1  - abdominal incision with external tissue expander device in place 1/24  - Can d/c antibiotics, will get CT scan to rule out abscess  -will d/c central line and stop TPN  -d/c A-line     Access: Right AC 18G extended dwell peripheral IV    Dispo: SICU    CC Diagnosis: Respiratory distress, fluid overload, malnutrition    The patient is a critical care patient with life threatening hemodynamic and metabolic instability in SICU.  I have personally interviewed when possible and examined the patient, reviewed data and laboratory tests/x-rays and all pertinent electronic images.  I was physically present for the key portions of the evaluation and management (E/M) service provided.   The SICU team has a constant risk benefit analyzes discussion with the primary team, all consultants, House Staff and PA's on all decisions.  These diagnoses are unrelated to the surgical procedure noted above.  I meet with family if needed to get further history, discuss the case and make care decisions for this patient who might not be able to participate.  Time involved in performance of separately billable procedures was not counted toward my critical care time. There is no overlap.  I spent 55-75 minutes of critical care time for the diagnoses, assessment, plan and interventions.

## 2019-01-30 NOTE — PHYSICAL THERAPY INITIAL EVALUATION ADULT - PLANNED THERAPY INTERVENTIONS, PT EVAL
balance training/bed mobility training/gait training/transfer training
gait training/strengthening/bed mobility training/transfer training/balance training

## 2019-01-31 LAB
ALBUMIN SERPL ELPH-MCNC: 2 G/DL — LOW (ref 3.3–5)
ALP SERPL-CCNC: 898 U/L — HIGH (ref 40–120)
ALT FLD-CCNC: 52 U/L — HIGH (ref 4–33)
ANION GAP SERPL CALC-SCNC: 11 MMO/L — SIGNIFICANT CHANGE UP (ref 7–14)
APTT BLD: 31 SEC — SIGNIFICANT CHANGE UP (ref 27.5–36.3)
AST SERPL-CCNC: 114 U/L — HIGH (ref 4–32)
BASE EXCESS BLDA CALC-SCNC: 1.3 MMOL/L — SIGNIFICANT CHANGE UP
BASOPHILS # BLD AUTO: 0.07 K/UL — SIGNIFICANT CHANGE UP (ref 0–0.2)
BASOPHILS NFR BLD AUTO: 0.4 % — SIGNIFICANT CHANGE UP (ref 0–2)
BILIRUB SERPL-MCNC: 3.6 MG/DL — HIGH (ref 0.2–1.2)
BUN SERPL-MCNC: 12 MG/DL — SIGNIFICANT CHANGE UP (ref 7–23)
C DIFF TOX GENS STL QL NAA+PROBE: SIGNIFICANT CHANGE UP
CA-I BLD-SCNC: 1.09 MMOL/L — SIGNIFICANT CHANGE UP (ref 1.03–1.23)
CA-I BLDA-SCNC: 1.15 MMOL/L — SIGNIFICANT CHANGE UP (ref 1.15–1.29)
CALCIUM SERPL-MCNC: 8 MG/DL — LOW (ref 8.4–10.5)
CHLORIDE SERPL-SCNC: 100 MMOL/L — SIGNIFICANT CHANGE UP (ref 98–107)
CO2 SERPL-SCNC: 21 MMOL/L — LOW (ref 22–31)
CREAT SERPL-MCNC: 0.53 MG/DL — SIGNIFICANT CHANGE UP (ref 0.5–1.3)
EOSINOPHIL # BLD AUTO: 0.19 K/UL — SIGNIFICANT CHANGE UP (ref 0–0.5)
EOSINOPHIL NFR BLD AUTO: 1.2 % — SIGNIFICANT CHANGE UP (ref 0–6)
GLUCOSE BLDA-MCNC: 137 MG/DL — HIGH (ref 70–99)
GLUCOSE SERPL-MCNC: 147 MG/DL — HIGH (ref 70–99)
HCO3 BLDA-SCNC: 26 MMOL/L — SIGNIFICANT CHANGE UP (ref 22–26)
HCT VFR BLD CALC: 25.1 % — LOW (ref 34.5–45)
HCT VFR BLDA CALC: 24 % — LOW (ref 34.5–46.5)
HGB BLD-MCNC: 8.2 G/DL — LOW (ref 11.5–15.5)
HGB BLDA-MCNC: 7.7 G/DL — LOW (ref 11.5–15.5)
IMM GRANULOCYTES NFR BLD AUTO: 1.2 % — SIGNIFICANT CHANGE UP (ref 0–1.5)
INR BLD: 1.62 — HIGH (ref 0.88–1.17)
LACTATE BLDA-SCNC: 1.9 MMOL/L — SIGNIFICANT CHANGE UP (ref 0.5–2)
LYMPHOCYTES # BLD AUTO: 14.1 % — SIGNIFICANT CHANGE UP (ref 13–44)
LYMPHOCYTES # BLD AUTO: 2.29 K/UL — SIGNIFICANT CHANGE UP (ref 1–3.3)
MAGNESIUM SERPL-MCNC: 1.9 MG/DL — SIGNIFICANT CHANGE UP (ref 1.6–2.6)
MCHC RBC-ENTMCNC: 27.2 PG — SIGNIFICANT CHANGE UP (ref 27–34)
MCHC RBC-ENTMCNC: 32.7 % — SIGNIFICANT CHANGE UP (ref 32–36)
MCV RBC AUTO: 83.4 FL — SIGNIFICANT CHANGE UP (ref 80–100)
MONOCYTES # BLD AUTO: 2.66 K/UL — HIGH (ref 0–0.9)
MONOCYTES NFR BLD AUTO: 16.4 % — HIGH (ref 2–14)
NEUTROPHILS # BLD AUTO: 10.83 K/UL — HIGH (ref 1.8–7.4)
NEUTROPHILS NFR BLD AUTO: 66.7 % — SIGNIFICANT CHANGE UP (ref 43–77)
NRBC # FLD: 0.02 K/UL — LOW (ref 25–125)
PCO2 BLDA: 29 MMHG — LOW (ref 32–48)
PH BLDA: 7.53 PH — HIGH (ref 7.35–7.45)
PHOSPHATE SERPL-MCNC: 2.2 MG/DL — LOW (ref 2.5–4.5)
PLATELET # BLD AUTO: 337 K/UL — SIGNIFICANT CHANGE UP (ref 150–400)
PMV BLD: 10.4 FL — SIGNIFICANT CHANGE UP (ref 7–13)
PO2 BLDA: 64 MMHG — LOW (ref 83–108)
POTASSIUM BLDA-SCNC: 3.3 MMOL/L — LOW (ref 3.4–4.5)
POTASSIUM SERPL-MCNC: 3.3 MMOL/L — LOW (ref 3.5–5.3)
POTASSIUM SERPL-SCNC: 3.3 MMOL/L — LOW (ref 3.5–5.3)
PROT SERPL-MCNC: 6.1 G/DL — SIGNIFICANT CHANGE UP (ref 6–8.3)
PROTHROM AB SERPL-ACNC: 18.8 SEC — HIGH (ref 9.8–13.1)
RBC # BLD: 3.01 M/UL — LOW (ref 3.8–5.2)
RBC # FLD: 17.3 % — HIGH (ref 10.3–14.5)
SAO2 % BLDA: 93.9 % — LOW (ref 95–99)
SODIUM BLDA-SCNC: 131 MMOL/L — LOW (ref 136–146)
SODIUM SERPL-SCNC: 132 MMOL/L — LOW (ref 135–145)
WBC # BLD: 16.24 K/UL — HIGH (ref 3.8–10.5)
WBC # FLD AUTO: 16.24 K/UL — HIGH (ref 3.8–10.5)

## 2019-01-31 PROCEDURE — 99291 CRITICAL CARE FIRST HOUR: CPT

## 2019-01-31 PROCEDURE — 71045 X-RAY EXAM CHEST 1 VIEW: CPT | Mod: 26

## 2019-01-31 RX ORDER — POTASSIUM PHOSPHATE, MONOBASIC POTASSIUM PHOSPHATE, DIBASIC 236; 224 MG/ML; MG/ML
30 INJECTION, SOLUTION INTRAVENOUS ONCE
Qty: 0 | Refills: 0 | Status: DISCONTINUED | OUTPATIENT
Start: 2019-01-31 | End: 2019-01-31

## 2019-01-31 RX ORDER — POTASSIUM PHOSPHATE, MONOBASIC POTASSIUM PHOSPHATE, DIBASIC 236; 224 MG/ML; MG/ML
15 INJECTION, SOLUTION INTRAVENOUS ONCE
Qty: 0 | Refills: 0 | Status: COMPLETED | OUTPATIENT
Start: 2019-01-31 | End: 2019-01-31

## 2019-01-31 RX ORDER — ALBUMIN HUMAN 25 %
50 VIAL (ML) INTRAVENOUS ONCE
Qty: 0 | Refills: 0 | Status: COMPLETED | OUTPATIENT
Start: 2019-01-31 | End: 2019-01-31

## 2019-01-31 RX ORDER — ALBUMIN HUMAN 25 %
250 VIAL (ML) INTRAVENOUS ONCE
Qty: 0 | Refills: 0 | Status: COMPLETED | OUTPATIENT
Start: 2019-01-31 | End: 2019-01-31

## 2019-01-31 RX ORDER — ACETAMINOPHEN 500 MG
1000 TABLET ORAL ONCE
Qty: 0 | Refills: 0 | Status: COMPLETED | OUTPATIENT
Start: 2019-01-31 | End: 2019-01-31

## 2019-01-31 RX ORDER — IPRATROPIUM/ALBUTEROL SULFATE 18-103MCG
3 AEROSOL WITH ADAPTER (GRAM) INHALATION ONCE
Qty: 0 | Refills: 0 | Status: COMPLETED | OUTPATIENT
Start: 2019-01-31 | End: 2019-01-31

## 2019-01-31 RX ORDER — FUROSEMIDE 40 MG
40 TABLET ORAL ONCE
Qty: 0 | Refills: 0 | Status: COMPLETED | OUTPATIENT
Start: 2019-01-31 | End: 2019-01-31

## 2019-01-31 RX ORDER — HALOPERIDOL DECANOATE 100 MG/ML
2.5 INJECTION INTRAMUSCULAR EVERY 6 HOURS
Qty: 0 | Refills: 0 | Status: DISCONTINUED | OUTPATIENT
Start: 2019-01-31 | End: 2019-01-31

## 2019-01-31 RX ORDER — QUETIAPINE FUMARATE 200 MG/1
50 TABLET, FILM COATED ORAL AT BEDTIME
Qty: 0 | Refills: 0 | Status: DISCONTINUED | OUTPATIENT
Start: 2019-01-31 | End: 2019-02-05

## 2019-01-31 RX ADMIN — Medication 200 MILLIGRAM(S): at 17:52

## 2019-01-31 RX ADMIN — Medication 50 MILLILITER(S): at 11:45

## 2019-01-31 RX ADMIN — CHLORHEXIDINE GLUCONATE 1 APPLICATION(S): 213 SOLUTION TOPICAL at 10:27

## 2019-01-31 RX ADMIN — Medication 1 DROP(S): at 06:12

## 2019-01-31 RX ADMIN — Medication 200 MILLIGRAM(S): at 09:16

## 2019-01-31 RX ADMIN — Medication 400 MILLIGRAM(S): at 17:59

## 2019-01-31 RX ADMIN — Medication 125 MILLILITER(S): at 19:29

## 2019-01-31 RX ADMIN — Medication 40 MILLIGRAM(S): at 11:45

## 2019-01-31 RX ADMIN — POTASSIUM PHOSPHATE, MONOBASIC POTASSIUM PHOSPHATE, DIBASIC 62.5 MILLIMOLE(S): 236; 224 INJECTION, SOLUTION INTRAVENOUS at 09:16

## 2019-01-31 RX ADMIN — Medication 3 MILLILITER(S): at 20:01

## 2019-01-31 RX ADMIN — Medication 650 MILLIGRAM(S): at 00:11

## 2019-01-31 RX ADMIN — Medication 1 DROP(S): at 17:52

## 2019-01-31 RX ADMIN — ENOXAPARIN SODIUM 40 MILLIGRAM(S): 100 INJECTION SUBCUTANEOUS at 11:45

## 2019-01-31 RX ADMIN — Medication 650 MILLIGRAM(S): at 01:00

## 2019-01-31 NOTE — PROGRESS NOTE ADULT - SUBJECTIVE AND OBJECTIVE BOX
General Surgery Progress Note    SUBJECTIVE:  The patient was seen and examined. No acute events overnight. CTA/P yesterday showed no abscess with +pelvic ascites which may be postoperative or related to the presence of hemorrhagic products.       OBJECTIVE:     ** VITAL SIGNS / I&O's **    Vital Signs Last 24 Hrs  T(C): 38.2 (31 Jan 2019 08:00), Max: 38.2 (31 Jan 2019 08:00)  T(F): 100.8 (31 Jan 2019 08:00), Max: 100.8 (31 Jan 2019 08:00)  HR: 97 (31 Jan 2019 10:00) (72 - 102)  BP: 138/68 (31 Jan 2019 10:00) (92/67 - 159/94)  BP(mean): 83 (31 Jan 2019 10:00) (56 - 111)  RR: 31 (31 Jan 2019 10:00) (21 - 33)  SpO2: 95% (31 Jan 2019 10:00) (94% - 100%)      30 Jan 2019 07:01  -  31 Jan 2019 07:00  --------------------------------------------------------  IN:    dextrose 10%: 600 mL    dextrose 5% + sodium chloride 0.45%.: 900 mL    Oral Fluid: 100 mL    TPN (Total Parenteral Nutrition): 332 mL  Total IN: 1932 mL    OUT:    Bulb: 140 mL    Bulb: 120 mL    Indwelling Catheter - Urethral: 1875 mL    Voided: 1025 mL  Total OUT: 3160 mL    Total NET: -1228 mL      31 Jan 2019 07:01  -  31 Jan 2019 10:47  --------------------------------------------------------  IN:    dextrose 5% + sodium chloride 0.45%.: 300 mL    IV PiggyBack: 250 mL  Total IN: 550 mL    OUT:    Voided: 250 mL  Total OUT: 250 mL    Total NET: 300 mL          ** PHYSICAL EXAM **    -- CONSTITUTIONAL: Alert, NAD  -- PULMONARY: non-labored respirations  -- ABDOMEN: soft, ND, midline incision re-packed, LLQ and RUQ JOURDAN bilious, no rebound or guarding    ** LABS **                          8.2    16.24 )-----------( 337      ( 31 Jan 2019 06:00 )             25.1     31 Jan 2019 06:00    132    |  100    |  12     ----------------------------<  147    3.3     |  21     |  0.53     Ca    8.0        31 Jan 2019 06:00  Phos  2.2       31 Jan 2019 06:00  Mg     1.9       31 Jan 2019 06:00    TPro  6.1    /  Alb  2.0    /  TBili  3.6    /  DBili  x      /  AST  114    /  ALT  52     /  AlkPhos  898    31 Jan 2019 06:00    PT/INR - ( 31 Jan 2019 08:15 )   PT: 18.8 SEC;   INR: 1.62          PTT - ( 31 Jan 2019 08:15 )  PTT:31.0 SEC  CAPILLARY BLOOD GLUCOSE      POCT Blood Glucose.: 149 mg/dL (31 Jan 2019 06:01)  POCT Blood Glucose.: 187 mg/dL (30 Jan 2019 23:35)  POCT Blood Glucose.: 210 mg/dL (30 Jan 2019 18:12)  POCT Blood Glucose.: 169 mg/dL (30 Jan 2019 12:45)        LIVER FUNCTIONS - ( 31 Jan 2019 06:00 )  Alb: 2.0 g/dL / Pro: 6.1 g/dL / ALK PHOS: 898 u/L / ALT: 52 u/L / AST: 114 u/L / GGT: x                 MEDICATIONS  (STANDING):  artificial  tears Solution 1 Drop(s) Both EYES every 12 hours  chlorhexidine 4% Liquid 1 Application(s) Topical <User Schedule>  dextrose 5% + sodium chloride 0.45%. 1000 milliLiter(s) (75 mL/Hr) IV Continuous <Continuous>  enoxaparin Injectable 40 milliGRAM(s) SubCutaneous daily  insulin lispro (HumaLOG) corrective regimen sliding scale   SubCutaneous every 6 hours  labetalol 200 milliGRAM(s) Oral three times a day  QUEtiapine 25 milliGRAM(s) Oral at bedtime    MEDICATIONS  (PRN):  acetaminophen    Suspension .. 650 milliGRAM(s) Oral every 6 hours PRN Moderate Pain (4 - 6)  HYDROmorphone  Injectable 0.5 milliGRAM(s) IV Push every 4 hours PRN Pain unrelieved by PO  oxyCODONE    IR 5 milliGRAM(s) Oral every 4 hours PRN Moderate Pain (4 - 6)  oxyCODONE    IR 10 milliGRAM(s) Oral every 4 hours PRN Severe Pain (7 - 10)

## 2019-01-31 NOTE — PROGRESS NOTE ADULT - ASSESSMENT
54y Female PMHx HTN, T2DM, asthma, depression with recent diagnosis of invasive ampullary adenocarcinoma of the CBD admitted for cholangitis on 1/5, s/p ERCP 1/8, whipple 1/11, rapid response for GIB on floor 1/18 s/p MTP, RTOR s/p celiotomy, evacuation of hematoma, GDA bleed stopped and sutured, Abthera vac. Closure of abdomen with strattice mesh on 1/21, with inna drains and left lower JOURDAN drain in subcutaneous. Incisional bleed 1/23, bedside skin staples removed, JOURDAN drain removed, hemorrhage controlled, for continuation of ICU care.     PLAN:  -continue care per sicu  -pain control as needed  -lovenox daily for dvt ppx  -PT/oob  -cont clears

## 2019-01-31 NOTE — PROGRESS NOTE ADULT - ASSESSMENT
ASSESSMENT:  54y Female PMHx HTN, T2DM, asthma, depression with recent diagnosis of invasive ampullary adenocarcinoma of the CBD admitted for cholangitis on 1/5, s/p ERCP 1/8, whipple 1/11, rapid response for GIB on floor 1/18 s/p MTP, RTOR s/p celiotomy, evacuation of hematoma, GDA bleed stopped and sutured, Abthera vac. Closure of abdomen with strattice mesh on 1/21, with inna drains and left lower JOURDAN drain in subcutaneous. Incisional bleed 1/23, bedside skin staples removed, JOURDAN drain removed, hemorrhage controlled, for continuation of ICU care.     PLAN:   Neurologic: not sedated, pain mgmt as needed with oxy 5/10, dilaudid, and tylenol  -Haldol 2.5Q6hr IV (Can go up to 5Q6H)  -Increase seroquel to 50 to help sleep at night    Respiratory: extubated, breathing on RA  -CXR today  -Lasix 40  -Albumin 50      Cardiovascular: Labetalol changed from 300 to 200 TID.  -BP becoming better controlled     Gastrointestinal/Nutrition: C/w clears     Renal/Genitourinary:   -d/c fluids  monitor U/O    Hematologic: H&H stable, transfuse if below 7/21. AM labs, lovenox 40mg SQ QD     Infectious Disease: IV Abx stopped today, WBC from 10 to 14.85.   -Mild temperature today 100.8  -CT ABD/pelvis shows collections starting. Not an abscess; however, we will monitor it.  -Will get repeat CT on Sunday night    Tubes/Lines/Drains: JOURDAN X 2    Endocrine: ROSHAN ELIZALDE    MSK: Level 1 restraints as needed     Disposition: SICU    --------------------------------------------------------------------------------------    Critical Care Diagnoses: Cholangitis, invasive ampullary adenocarcinoma, GIB, hematoma

## 2019-01-31 NOTE — PROGRESS NOTE ADULT - ATTENDING COMMENTS
54y Female PMHx HTN, T2DM, asthma, depression with recent diagnosis of invasive ampullary adenocarcinoma of the CBD admitted for cholangitis on 1/5, s/p ERCP 1/8, whipple 1/11, rapid response for GIB on floor 1/18 s/p MTP, RTOR s/p celiotomy, evacuation of hematoma, GDA bleed stopped and sutured, Abthera vac. Closure of abdomen with strattice mesh on 1/21, with inna drains and left lower JOURDAN drain in subcutaneous. Incisional bleed 1/23, bedside skin staples removed, JOURDAN drain removed, hemorrhage controlled, for continuation of ICU care.     PLAN:   Neurologic: not sedated, pain mgmt as needed with oxy 5/10, dilaudid, and tylenol  -Haldol 2.5Q6hr IV (Can go up to 5Q6H)  -Increase seroquel to 50 to help sleep at night    Respiratory: extubated, breathing on RA  -CXR today  -Lasix 40  -Albumin 50      Cardiovascular: Labetalol changed from 300 to 200 TID.  -BP becoming better controlled     Gastrointestinal/Nutrition: C/w clears     Renal/Genitourinary:   -d/c fluids  monitor U/O    Hematologic: H&H stable, transfuse if below 7/21. AM labs, lovenox 40mg SQ QD     Infectious Disease: IV Abx stopped today, WBC from 10 to 14.85.   -Mild temperature today 100.8  -CT ABD/pelvis shows collections starting. Not an abscess; however, we will monitor it.  -Will get repeat CT on Sunday night    Tubes/Lines/Drains: JOURDAN X 2    Endocrine: ROSHAN ELIZALDE    MSK: Level 1 restraints as needed     Disposition: SICU    --------------------------------------------------------------------------------------    Critical Care Diagnoses: Cholangitis, invasive ampullary adenocarcinoma, GIB, hematoma  The patient is a critical care patient with life threatening hemodynamic and metabolic instability in SICU.  I have personally interviewed when possible and examined the patient, reviewed data and laboratory tests/x-rays and all pertinent electronic images.  I was physically present for the key portions of the evaluation and management (E/M) service provided.   The SICU team has a constant risk benefit analyzes discussion with the primary team, all consultants, House Staff and PA's on all decisions.  These diagnoses are unrelated to the surgical procedure noted above.  I meet with family if needed to get further history, discuss the case and make care decisions for this patient who might not be able to participate.  Time involved in performance of separately billable procedures was not counted toward my critical care time. There is no overlap.  I spent 55-75 minutes of critical care time for the diagnoses, assessment, plan and interventions.

## 2019-01-31 NOTE — PROGRESS NOTE ADULT - SUBJECTIVE AND OBJECTIVE BOX
SICU Daily Progress Note  =====================================================  Interval/Overnight Events:        54y Female PMHx HTN, T2DM, asthma, depression with recent diagnosis of invasive ampullary adenocarcinoma of the CBD admitted for cholangitis on 1/5, s/p ERCP 1/8, whipple 1/11, rapid response for GIB on floor 1/18 s/p MTP, RTOR s/p celiotomy, evacuation of hematoma, GDA bleed stopped and sutured, Abthera vac. Closure of abdomen with strattice mesh on 1/21, with inna drains and left lower JOURDAN drain in subcutaneous, s/p incisional bleed 1/23, bedside skin staples removed, JOURDAN drain removed, hemorrhage controlled, for continuation of ICU care for respiratory failure     24 HOUR EVENTS: D/c'ed LIJ line & right axillary arterial line. Right AC extended dwell peripheral IV placed under US guidance. CT A/P done, no abscess, pelvic ascites, findings may be   postoperative or related to the presence of hemorrhagic products. Haldol 2.5 for delirium, started on seroquel 25mg qhs. Started on po labetalol for persistent HTN.    Allergies: No Known Allergies      MEDICATIONS:   --------------------------------------------------------------------------------------  Neurologic Medications  acetaminophen    Suspension .. 650 milliGRAM(s) Oral every 6 hours PRN Moderate Pain (4 - 6)  HYDROmorphone  Injectable 0.5 milliGRAM(s) IV Push every 4 hours PRN Pain unrelieved by PO  oxyCODONE    IR 5 milliGRAM(s) Oral every 4 hours PRN Moderate Pain (4 - 6)  oxyCODONE    IR 10 milliGRAM(s) Oral every 4 hours PRN Severe Pain (7 - 10)  QUEtiapine 25 milliGRAM(s) Oral at bedtime    Respiratory Medications    Cardiovascular Medications  labetalol 200 milliGRAM(s) Oral three times a day    Gastrointestinal Medications  dextrose 5% + sodium chloride 0.45%. 1000 milliLiter(s) IV Continuous <Continuous>    Genitourinary Medications    Hematologic/Oncologic Medications  enoxaparin Injectable 40 milliGRAM(s) SubCutaneous daily    Antimicrobial/Immunologic Medications    Endocrine/Metabolic Medications  insulin lispro (HumaLOG) corrective regimen sliding scale   SubCutaneous every 6 hours    Topical/Other Medications  artificial  tears Solution 1 Drop(s) Both EYES every 12 hours  chlorhexidine 4% Liquid 1 Application(s) Topical <User Schedule>    --------------------------------------------------------------------------------------  ICU Vital Signs Last 24 Hrs  T(C): 37.3 (31 Jan 2019 00:00), Max: 37.7 (30 Jan 2019 07:48)  T(F): 99.2 (31 Jan 2019 00:00), Max: 99.8 (30 Jan 2019 07:48)  HR: 100 (31 Jan 2019 00:00) (72 - 104)  BP: 126/54 (31 Jan 2019 00:00) (92/67 - 159/94)  BP(mean): 71 (31 Jan 2019 00:00) (63 - 111)  ABP: 160/68 (30 Jan 2019 12:00) (152/71 - 190/88)  ABP(mean): 107 (30 Jan 2019 12:00) (99 - 133)  RR: 33 (31 Jan 2019 00:00) (21 - 33)  SpO2: 96% (31 Jan 2019 00:00) (93% - 100%)    --------------------------------------------------------------------------------------  I&O's Detail    29 Jan 2019 07:01  -  30 Jan 2019 07:00  --------------------------------------------------------  IN:    dexmedetomidine Infusion: 20 mL    fat emulsion (Fish Oil and Plant Based) 20% Infusion: 187.5 mL    IV PiggyBack: 750 mL    TPN (Total Parenteral Nutrition): 1992 mL  Total IN: 2949.5 mL    OUT:    Bulb: 235 mL    Bulb: 155 mL    Indwelling Catheter - Urethral: 3305 mL    Nasoenteral Tube: 100 mL  Total OUT: 3795 mL    Total NET: -845.5 mL      30 Jan 2019 07:01  -  31 Jan 2019 00:20  --------------------------------------------------------  IN:    dextrose 10%: 600 mL    dextrose 5% + sodium chloride 0.45%.: 375 mL    Oral Fluid: 100 mL    TPN (Total Parenteral Nutrition): 332 mL  Total IN: 1407 mL    OUT:    Bulb: 50 mL    Bulb: 90 mL    Indwelling Catheter - Urethral: 1875 mL    Voided: 600 mL  Total OUT: 2615 mL    Total NET: -1208 mL        --------------------------------------------------------------------------------------    EXAM  NEUROLOGY  Exam: NAD, awake and responsive, fluctuating orientation. No focal deficits.     HEENT  Exam: Normocephalic, atraumatic, EOMI.      RESPIRATORY  Exam: Lungs clear to auscultation, Normal expansion/effort.     CARDIOVASCULAR  Exam: S1, S2.  Regular rate and rhythm.      GI/NUTRITION  Exam:   midline incision re-packed, LLQ and RUQ JOURDAN bilious, no rebound or guarding    Abdomen soft, non-distended, non-tenderness, midline incision packed, RLQ JOURDAN bilious, LLQ JOURDAN dark/serous outpt, no guarding, no rebound tenderness  Current Diet: Clears    VASCULAR  Exam: Extremities warm, pink, well-perfused.     MUSCULOSKELETAL  Exam: All extremities moving spontaneously without limitations.     SKIN  Exam: Good skin turgor, no skin breakdown.     METABOLIC/FLUIDS/ELECTROLYTES  dextrose 5% + sodium chloride 0.45%. 1000 milliLiter(s) IV Continuous <Continuous>      HEMATOLOGIC  [x] VTE Prophylaxis: enoxaparin Injectable 40 milliGRAM(s) SubCutaneous daily    Transfusions:	[] PRBC	[] Platelets		[] FFP	[] Cryoprecipitate    INFECTIOUS DISEASE  Antimicrobials/Immunologic Medications:    Tubes/Lines/Drains  JOURDAN X 2  [x] Peripheral IV    LABS  --------------------------------------------------------------------------------------  CBC (01-30 @ 03:00)                              8.6<L>                         14.86<H>  )----------------(  319        66.9  % Neutrophils, 12.8<L>% Lymphocytes, ANC: 9.92<H>                              25.9<L>    BMP (01-30 @ 12:32)             133<L>  |  99      |  15    		Ca++ --      Ca 8.2<L>             ---------------------------------( 185<H>		Mg 1.9                3.6     |  22      |  0.49<L>			Ph 2.9     BMP (01-30 @ 03:00)             131<L>  |  99      |  15    		Ca++ --      Ca 8.2<L>             ---------------------------------( 168<H>		Mg 1.9                3.2<L>  |  22      |  0.49<L>			Ph 2.2<L>    LFTs (01-30 @ 03:00)      TPro 6.4 / Alb 2.3<L> / TBili 4.4<H> / DBili 3.3<H> / <H> / ALT 58<H> / AlkPhos 978<H>        ABG (01-30 @ 03:00)     7.48<H> / 31<L> / 58<L> / 24 / -0.5 / 90.6<L>%     Lactate: 2.0       --------------------------------------------------------------------------------------  CT Abdomen/Pelvic  IMPRESSION:     Bilateral moderate pleural effusions with associated atelectasis.     Partially imaged right lower lung patchy opacity may represent small   infiltrate in the appropriate clinical setting.    Moderate abdominopelvic ascites most prominent subjacent to the anterior   abdominal wall.     Left upper and lower quadrant collection measuring greater than fluid   attenuation, which may reflect the presence of hemorrhagic products.    Mild enhancement is seen in association with the anterior abdominal   ascites, dependent pelvic ascites, and left mid abdominal collection,   consistent with the presence of peritonitis. Findings may be   postoperative or related to the presence of hemorrhagic products.   Infection is not excluded.    ASSESSMENT:  54y Female PMHx HTN, T2DM, asthma, depression with recent diagnosis of invasive ampullary adenocarcinoma of the CBD admitted for cholangitis on 1/5, s/p ERCP 1/8, whipple 1/11, rapid response for GIB on floor 1/18 s/p MTP, RTOR s/p celiotomy, evacuation of hematoma, GDA bleed stopped and sutured, Abthera vac. Closure of abdomen with strattice mesh on 1/21, with inan drains and left lower JOURDAN drain in subcutaneous. Incisional bleed 1/23, bedside skin staples removed, JOURDAN drain removed, hemorrhage controlled, for continuation of ICU care.     PLAN:   Neurologic: not sedated, pain mgmt as needed with oxy 5/10, dilaudid, and tylenol  Respiratory: extubated, breathing on RA, ISIS. CXR AM  Cardiovascular: Labetalol changed from 300 to 200 TID. BP becoming better controlled  Gastrointestinal/Nutrition: C/w clears   Renal/Genitourinary: D5, 1/2 NS @ 75, monitor U/O  Hematologic: H&H stable, transfuse if below 7/21. AM labs  Infectious Disease: IV Abx stopped today, WBC from 10 to 14.85. Afebrile today, CT ABD/pelvis shows no collection/abscess. Will continue to monitor  Tubes/Lines/Drains: JOURDAN X 2  Endocrine: ROSHAN ELIZALDE  MSK: Soft restraints as needed   Disposition: SICU    --------------------------------------------------------------------------------------    Critical Care Diagnoses: Cholangitis, invasive ampullary adenocarcinoma, GIB, hematoma SICU Daily Progress Note  =====================================================  Interval/Overnight Events:        54y Female PMHx HTN, T2DM, asthma, depression with recent diagnosis of invasive ampullary adenocarcinoma of the CBD admitted for cholangitis on 1/5, s/p ERCP 1/8, whipple 1/11, rapid response for GIB on floor 1/18 s/p MTP, RTOR s/p celiotomy, evacuation of hematoma, GDA bleed stopped and sutured, Abthera vac. Closure of abdomen with strattice mesh on 1/21, with inna drains and left lower JOURDAN drain in subcutaneous, s/p incisional bleed 1/23, bedside skin staples removed, JOURDAN drain removed, hemorrhage controlled, for continuation of ICU care for respiratory failure     24 HOUR EVENTS: D/c'ed LIJ line & right axillary arterial line. Right AC extended dwell peripheral IV placed under US guidance. CT A/P done, no abscess, pelvic ascites, findings may be   postoperative or related to the presence of hemorrhagic products. Haldol 2.5 for delirium, started on seroquel 25mg qhs. Started on po labetalol for persistent HTN.    Allergies: No Known Allergies      MEDICATIONS:   --------------------------------------------------------------------------------------  Neurologic Medications  acetaminophen    Suspension .. 650 milliGRAM(s) Oral every 6 hours PRN Moderate Pain (4 - 6)  HYDROmorphone  Injectable 0.5 milliGRAM(s) IV Push every 4 hours PRN Pain unrelieved by PO  oxyCODONE    IR 5 milliGRAM(s) Oral every 4 hours PRN Moderate Pain (4 - 6)  oxyCODONE    IR 10 milliGRAM(s) Oral every 4 hours PRN Severe Pain (7 - 10)  QUEtiapine 25 milliGRAM(s) Oral at bedtime    Respiratory Medications    Cardiovascular Medications  labetalol 200 milliGRAM(s) Oral three times a day    Gastrointestinal Medications  dextrose 5% + sodium chloride 0.45%. 1000 milliLiter(s) IV Continuous <Continuous>    Genitourinary Medications    Hematologic/Oncologic Medications  enoxaparin Injectable 40 milliGRAM(s) SubCutaneous daily    Antimicrobial/Immunologic Medications    Endocrine/Metabolic Medications  insulin lispro (HumaLOG) corrective regimen sliding scale   SubCutaneous every 6 hours    Topical/Other Medications  artificial  tears Solution 1 Drop(s) Both EYES every 12 hours  chlorhexidine 4% Liquid 1 Application(s) Topical <User Schedule>    --------------------------------------------------------------------------------------  ICU Vital Signs Last 24 Hrs  T(C): 37.3 (31 Jan 2019 00:00), Max: 37.7 (30 Jan 2019 07:48)  T(F): 99.2 (31 Jan 2019 00:00), Max: 99.8 (30 Jan 2019 07:48)  HR: 100 (31 Jan 2019 00:00) (72 - 104)  BP: 126/54 (31 Jan 2019 00:00) (92/67 - 159/94)  BP(mean): 71 (31 Jan 2019 00:00) (63 - 111)  ABP: 160/68 (30 Jan 2019 12:00) (152/71 - 190/88)  ABP(mean): 107 (30 Jan 2019 12:00) (99 - 133)  RR: 33 (31 Jan 2019 00:00) (21 - 33)  SpO2: 96% (31 Jan 2019 00:00) (93% - 100%)    --------------------------------------------------------------------------------------  I&O's Detail    29 Jan 2019 07:01  -  30 Jan 2019 07:00  --------------------------------------------------------  IN:    dexmedetomidine Infusion: 20 mL    fat emulsion (Fish Oil and Plant Based) 20% Infusion: 187.5 mL    IV PiggyBack: 750 mL    TPN (Total Parenteral Nutrition): 1992 mL  Total IN: 2949.5 mL    OUT:    Bulb: 235 mL    Bulb: 155 mL    Indwelling Catheter - Urethral: 3305 mL    Nasoenteral Tube: 100 mL  Total OUT: 3795 mL    Total NET: -845.5 mL      30 Jan 2019 07:01  -  31 Jan 2019 00:20  --------------------------------------------------------  IN:    dextrose 10%: 600 mL    dextrose 5% + sodium chloride 0.45%.: 375 mL    Oral Fluid: 100 mL    TPN (Total Parenteral Nutrition): 332 mL  Total IN: 1407 mL    OUT:    Bulb: 50 mL    Bulb: 90 mL    Indwelling Catheter - Urethral: 1875 mL    Voided: 600 mL  Total OUT: 2615 mL    Total NET: -1208 mL        --------------------------------------------------------------------------------------    EXAM  NEUROLOGY  Exam: NAD, awake and responsive, fluctuating orientation. No focal deficits.     HEENT  Exam: Normocephalic, atraumatic, EOMI.      RESPIRATORY  Exam: Lungs clear to auscultation, Normal expansion/effort.     CARDIOVASCULAR  Exam: S1, S2.  Regular rate and rhythm.      GI/NUTRITION  Exam:   midline incision re-packed, LLQ and RUQ JOURDAN bilious, no rebound or guarding    Abdomen soft, non-distended, non-tenderness, midline incision packed, RLQ JOURDAN bilious, LLQ JOURDAN dark/serous outpt, no guarding, no rebound tenderness  Current Diet: Clears    VASCULAR  Exam: Extremities warm, pink, well-perfused.     MUSCULOSKELETAL  Exam: All extremities moving spontaneously without limitations.     SKIN  Exam: Good skin turgor, no skin breakdown.     METABOLIC/FLUIDS/ELECTROLYTES  dextrose 5% + sodium chloride 0.45%. 1000 milliLiter(s) IV Continuous <Continuous>      HEMATOLOGIC  [x] VTE Prophylaxis: enoxaparin Injectable 40 milliGRAM(s) SubCutaneous daily    Transfusions:	[] PRBC	[] Platelets		[] FFP	[] Cryoprecipitate    INFECTIOUS DISEASE  Antimicrobials/Immunologic Medications:    Tubes/Lines/Drains  JOURDAN X 2  [x] Peripheral IV    LABS  --------------------------------------------------------------------------------------  CBC (01-30 @ 03:00)                              8.6<L>                         14.86<H>  )----------------(  319        66.9  % Neutrophils, 12.8<L>% Lymphocytes, ANC: 9.92<H>                              25.9<L>    BMP (01-30 @ 12:32)             133<L>  |  99      |  15    		Ca++ --      Ca 8.2<L>             ---------------------------------( 185<H>		Mg 1.9                3.6     |  22      |  0.49<L>			Ph 2.9     BMP (01-30 @ 03:00)             131<L>  |  99      |  15    		Ca++ --      Ca 8.2<L>             ---------------------------------( 168<H>		Mg 1.9                3.2<L>  |  22      |  0.49<L>			Ph 2.2<L>    LFTs (01-30 @ 03:00)      TPro 6.4 / Alb 2.3<L> / TBili 4.4<H> / DBili 3.3<H> / <H> / ALT 58<H> / AlkPhos 978<H>        ABG (01-30 @ 03:00)     7.48<H> / 31<L> / 58<L> / 24 / -0.5 / 90.6<L>%     Lactate: 2.0       --------------------------------------------------------------------------------------  CT Abdomen/Pelvic  IMPRESSION:     Bilateral moderate pleural effusions with associated atelectasis.     Partially imaged right lower lung patchy opacity may represent small   infiltrate in the appropriate clinical setting.    Moderate abdominopelvic ascites most prominent subjacent to the anterior   abdominal wall.     Left upper and lower quadrant collection measuring greater than fluid   attenuation, which may reflect the presence of hemorrhagic products.    Mild enhancement is seen in association with the anterior abdominal   ascites, dependent pelvic ascites, and left mid abdominal collection,   consistent with the presence of peritonitis. Findings may be   postoperative or related to the presence of hemorrhagic products.   Infection is not excluded.    ASSESSMENT:  54y Female PMHx HTN, T2DM, asthma, depression with recent diagnosis of invasive ampullary adenocarcinoma of the CBD admitted for cholangitis on 1/5, s/p ERCP 1/8, whipple 1/11, rapid response for GIB on floor 1/18 s/p MTP, RTOR s/p celiotomy, evacuation of hematoma, GDA bleed stopped and sutured, Abthera vac. Closure of abdomen with strattice mesh on 1/21, with inna drains and left lower JOURDAN drain in subcutaneous. Incisional bleed 1/23, bedside skin staples removed, JOURDAN drain removed, hemorrhage controlled, for continuation of ICU care.     PLAN:   Neurologic: not sedated, pain mgmt as needed with oxy 5/10, dilaudid, and tylenol  Respiratory: extubated, breathing on RA, ISIS. CXR AM  Cardiovascular: Labetalol changed from 300 to 200 TID. BP becoming better controlled   Gastrointestinal/Nutrition: C/w clears   Renal/Genitourinary: D5, 1/2 NS @ 75, monitor U/O  Hematologic: H&H stable, transfuse if below 7/21. AM labs, lovenox 40mg SQ QD   Infectious Disease: IV Abx stopped today, WBC from 10 to 14.85. Afebrile today, CT ABD/pelvis shows no collection/abscess. Will continue to monitor  Tubes/Lines/Drains: JOURDAN X 2  Endocrine: ROSHAN ELIZALDE  MSK: Soft restraints as needed   Disposition: SICU    --------------------------------------------------------------------------------------    Critical Care Diagnoses: Cholangitis, invasive ampullary adenocarcinoma, GIB, hematoma SICU Daily Progress Note  =====================================================  Interval/Overnight Events:        54y Female PMHx HTN, T2DM, asthma, depression with recent diagnosis of invasive ampullary adenocarcinoma of the CBD admitted for cholangitis on 1/5, s/p ERCP 1/8, whipple 1/11, rapid response for GIB on floor 1/18 s/p MTP, RTOR s/p celiotomy, evacuation of hematoma, GDA bleed stopped and sutured, Abthera vac. Closure of abdomen with strattice mesh on 1/21, with inna drains and left lower JOURDAN drain in subcutaneous, s/p incisional bleed 1/23, bedside skin staples removed, JOURDAN drain removed, hemorrhage controlled, for continuation of ICU care for respiratory failure     24 HOUR EVENTS: D/c'ed LIJ line & right axillary arterial line. Right AC extended dwell peripheral IV placed under US guidance. CT A/P done, no abscess, pelvic ascites, findings may be   postoperative or related to the presence of hemorrhagic products. Haldol 2.5 for delirium, started on seroquel 25mg qhs. Started on po labetalol for persistent HTN.    Allergies: No Known Allergies      MEDICATIONS:   --------------------------------------------------------------------------------------  Neurologic Medications  acetaminophen    Suspension .. 650 milliGRAM(s) Oral every 6 hours PRN Moderate Pain (4 - 6)  HYDROmorphone  Injectable 0.5 milliGRAM(s) IV Push every 4 hours PRN Pain unrelieved by PO  oxyCODONE    IR 5 milliGRAM(s) Oral every 4 hours PRN Moderate Pain (4 - 6)  oxyCODONE    IR 10 milliGRAM(s) Oral every 4 hours PRN Severe Pain (7 - 10)  QUEtiapine 25 milliGRAM(s) Oral at bedtime    Respiratory Medications    Cardiovascular Medications  labetalol 200 milliGRAM(s) Oral three times a day    Gastrointestinal Medications  dextrose 5% + sodium chloride 0.45%. 1000 milliLiter(s) IV Continuous <Continuous>    Genitourinary Medications    Hematologic/Oncologic Medications  enoxaparin Injectable 40 milliGRAM(s) SubCutaneous daily    Antimicrobial/Immunologic Medications    Endocrine/Metabolic Medications  insulin lispro (HumaLOG) corrective regimen sliding scale   SubCutaneous every 6 hours    Topical/Other Medications  artificial  tears Solution 1 Drop(s) Both EYES every 12 hours  chlorhexidine 4% Liquid 1 Application(s) Topical <User Schedule>    --------------------------------------------------------------------------------------  ICU Vital Signs Last 24 Hrs  T(C): 37.3 (31 Jan 2019 00:00), Max: 37.7 (30 Jan 2019 07:48)  T(F): 99.2 (31 Jan 2019 00:00), Max: 99.8 (30 Jan 2019 07:48)  HR: 100 (31 Jan 2019 00:00) (72 - 104)  BP: 126/54 (31 Jan 2019 00:00) (92/67 - 159/94)  BP(mean): 71 (31 Jan 2019 00:00) (63 - 111)  ABP: 160/68 (30 Jan 2019 12:00) (152/71 - 190/88)  ABP(mean): 107 (30 Jan 2019 12:00) (99 - 133)  RR: 33 (31 Jan 2019 00:00) (21 - 33)  SpO2: 96% (31 Jan 2019 00:00) (93% - 100%)    --------------------------------------------------------------------------------------  I&O's Detail    29 Jan 2019 07:01  -  30 Jan 2019 07:00  --------------------------------------------------------  IN:    dexmedetomidine Infusion: 20 mL    fat emulsion (Fish Oil and Plant Based) 20% Infusion: 187.5 mL    IV PiggyBack: 750 mL    TPN (Total Parenteral Nutrition): 1992 mL  Total IN: 2949.5 mL    OUT:    Bulb: 235 mL    Bulb: 155 mL    Indwelling Catheter - Urethral: 3305 mL    Nasoenteral Tube: 100 mL  Total OUT: 3795 mL    Total NET: -845.5 mL      30 Jan 2019 07:01  -  31 Jan 2019 00:20  --------------------------------------------------------  IN:    dextrose 10%: 600 mL    dextrose 5% + sodium chloride 0.45%.: 375 mL    Oral Fluid: 100 mL    TPN (Total Parenteral Nutrition): 332 mL  Total IN: 1407 mL    OUT:    Bulb: 50 mL    Bulb: 90 mL    Indwelling Catheter - Urethral: 1875 mL    Voided: 600 mL  Total OUT: 2615 mL    Total NET: -1208 mL        --------------------------------------------------------------------------------------    EXAM  NEUROLOGY  Exam: NAD, awake and responsive, fluctuating orientation. No focal deficits.     HEENT  Exam: Normocephalic, atraumatic, EOMI.      RESPIRATORY  Exam: Lungs clear to auscultation, Normal expansion/effort.     CARDIOVASCULAR  Exam: S1, S2.  Regular rate and rhythm.      GI/NUTRITION  Exam:   midline incision re-packed, LLQ and RUQ JOURDAN bilious, no rebound or guarding    Abdomen soft, non-distended, non-tenderness, midline incision packed, RLQ JOURDAN bilious, LLQ JOURDAN dark/serous outpt, no guarding, no rebound tenderness  Current Diet: Clears    VASCULAR  Exam: Extremities warm, pink, well-perfused.     MUSCULOSKELETAL  Exam: All extremities moving spontaneously without limitations.     SKIN  Exam: Good skin turgor, no skin breakdown.     METABOLIC/FLUIDS/ELECTROLYTES  dextrose 5% + sodium chloride 0.45%. 1000 milliLiter(s) IV Continuous <Continuous>      HEMATOLOGIC  [x] VTE Prophylaxis: enoxaparin Injectable 40 milliGRAM(s) SubCutaneous daily    Transfusions:	[] PRBC	[] Platelets		[] FFP	[] Cryoprecipitate    INFECTIOUS DISEASE  Antimicrobials/Immunologic Medications:    Tubes/Lines/Drains  JOURDAN X 2  [x] Peripheral IV    LABS  --------------------------------------------------------------------------------------  CBC (01-30 @ 03:00)                              8.6<L>                         14.86<H>  )----------------(  319        66.9  % Neutrophils, 12.8<L>% Lymphocytes, ANC: 9.92<H>                              25.9<L>    BMP (01-30 @ 12:32)             133<L>  |  99      |  15    		Ca++ --      Ca 8.2<L>             ---------------------------------( 185<H>		Mg 1.9                3.6     |  22      |  0.49<L>			Ph 2.9     BMP (01-30 @ 03:00)             131<L>  |  99      |  15    		Ca++ --      Ca 8.2<L>             ---------------------------------( 168<H>		Mg 1.9                3.2<L>  |  22      |  0.49<L>			Ph 2.2<L>    LFTs (01-30 @ 03:00)      TPro 6.4 / Alb 2.3<L> / TBili 4.4<H> / DBili 3.3<H> / <H> / ALT 58<H> / AlkPhos 978<H>        ABG (01-30 @ 03:00)     7.48<H> / 31<L> / 58<L> / 24 / -0.5 / 90.6<L>%     Lactate: 2.0       --------------------------------------------------------------------------------------  CT Abdomen/Pelvic  IMPRESSION:     Bilateral moderate pleural effusions with associated atelectasis.     Partially imaged right lower lung patchy opacity may represent small   infiltrate in the appropriate clinical setting.    Moderate abdominopelvic ascites most prominent subjacent to the anterior   abdominal wall.     Left upper and lower quadrant collection measuring greater than fluid   attenuation, which may reflect the presence of hemorrhagic products.    Mild enhancement is seen in association with the anterior abdominal   ascites, dependent pelvic ascites, and left mid abdominal collection,   consistent with the presence of peritonitis. Findings may be   postoperative or related to the presence of hemorrhagic products.   Infection is not excluded.

## 2019-02-01 LAB
ALBUMIN SERPL ELPH-MCNC: 2.3 G/DL — LOW (ref 3.3–5)
ALP SERPL-CCNC: 792 U/L — HIGH (ref 40–120)
ALT FLD-CCNC: 43 U/L — HIGH (ref 4–33)
ANION GAP SERPL CALC-SCNC: 12 MMO/L — SIGNIFICANT CHANGE UP (ref 7–14)
APPEARANCE UR: CLEAR — SIGNIFICANT CHANGE UP
AST SERPL-CCNC: 81 U/L — HIGH (ref 4–32)
BACTERIA # UR AUTO: SIGNIFICANT CHANGE UP
BILIRUB DIRECT SERPL-MCNC: 2.2 MG/DL — HIGH (ref 0.1–0.2)
BILIRUB SERPL-MCNC: 3.3 MG/DL — HIGH (ref 0.2–1.2)
BILIRUB UR-MCNC: NEGATIVE — SIGNIFICANT CHANGE UP
BLOOD UR QL VISUAL: SIGNIFICANT CHANGE UP
BUN SERPL-MCNC: 18 MG/DL — SIGNIFICANT CHANGE UP (ref 7–23)
CA-I BLD-SCNC: 1.06 MMOL/L — SIGNIFICANT CHANGE UP (ref 1.03–1.23)
CALCIUM SERPL-MCNC: 7.9 MG/DL — LOW (ref 8.4–10.5)
CHLORIDE SERPL-SCNC: 99 MMOL/L — SIGNIFICANT CHANGE UP (ref 98–107)
CO2 SERPL-SCNC: 22 MMOL/L — SIGNIFICANT CHANGE UP (ref 22–31)
COLOR SPEC: YELLOW — SIGNIFICANT CHANGE UP
CREAT SERPL-MCNC: 0.62 MG/DL — SIGNIFICANT CHANGE UP (ref 0.5–1.3)
GLUCOSE SERPL-MCNC: 141 MG/DL — HIGH (ref 70–99)
GLUCOSE UR-MCNC: NEGATIVE — SIGNIFICANT CHANGE UP
GRAN CASTS # UR COMP ASSIST: SIGNIFICANT CHANGE UP
HCT VFR BLD CALC: 22.4 % — LOW (ref 34.5–45)
HCT VFR BLD CALC: 25 % — LOW (ref 34.5–45)
HGB BLD-MCNC: 7.5 G/DL — LOW (ref 11.5–15.5)
HGB BLD-MCNC: 8 G/DL — LOW (ref 11.5–15.5)
HYALINE CASTS # UR AUTO: SIGNIFICANT CHANGE UP
KETONES UR-MCNC: NEGATIVE — SIGNIFICANT CHANGE UP
LEUKOCYTE ESTERASE UR-ACNC: NEGATIVE — SIGNIFICANT CHANGE UP
MAGNESIUM SERPL-MCNC: 1.9 MG/DL — SIGNIFICANT CHANGE UP (ref 1.6–2.6)
MCHC RBC-ENTMCNC: 27.7 PG — SIGNIFICANT CHANGE UP (ref 27–34)
MCHC RBC-ENTMCNC: 28.3 PG — SIGNIFICANT CHANGE UP (ref 27–34)
MCHC RBC-ENTMCNC: 32 % — SIGNIFICANT CHANGE UP (ref 32–36)
MCHC RBC-ENTMCNC: 33.5 % — SIGNIFICANT CHANGE UP (ref 32–36)
MCV RBC AUTO: 84.5 FL — SIGNIFICANT CHANGE UP (ref 80–100)
MCV RBC AUTO: 86.5 FL — SIGNIFICANT CHANGE UP (ref 80–100)
NITRITE UR-MCNC: NEGATIVE — SIGNIFICANT CHANGE UP
NRBC # FLD: 0 K/UL — LOW (ref 25–125)
NRBC # FLD: 0.02 K/UL — LOW (ref 25–125)
PH UR: 6 — SIGNIFICANT CHANGE UP (ref 5–8)
PHOSPHATE SERPL-MCNC: 3.2 MG/DL — SIGNIFICANT CHANGE UP (ref 2.5–4.5)
PLATELET # BLD AUTO: 336 K/UL — SIGNIFICANT CHANGE UP (ref 150–400)
PLATELET # BLD AUTO: 401 K/UL — HIGH (ref 150–400)
PMV BLD: 9.7 FL — SIGNIFICANT CHANGE UP (ref 7–13)
PMV BLD: 9.9 FL — SIGNIFICANT CHANGE UP (ref 7–13)
POTASSIUM SERPL-MCNC: 3.5 MMOL/L — SIGNIFICANT CHANGE UP (ref 3.5–5.3)
POTASSIUM SERPL-SCNC: 3.5 MMOL/L — SIGNIFICANT CHANGE UP (ref 3.5–5.3)
PROT SERPL-MCNC: 5.8 G/DL — LOW (ref 6–8.3)
PROT UR-MCNC: 30 — SIGNIFICANT CHANGE UP
RBC # BLD: 2.65 M/UL — LOW (ref 3.8–5.2)
RBC # BLD: 2.89 M/UL — LOW (ref 3.8–5.2)
RBC # FLD: 17.2 % — HIGH (ref 10.3–14.5)
RBC # FLD: 17.3 % — HIGH (ref 10.3–14.5)
RBC CASTS # UR COMP ASSIST: HIGH (ref 0–?)
SODIUM SERPL-SCNC: 133 MMOL/L — LOW (ref 135–145)
SP GR SPEC: 1.01 — SIGNIFICANT CHANGE UP (ref 1–1.04)
SQUAMOUS # UR AUTO: SIGNIFICANT CHANGE UP
UROBILINOGEN FLD QL: NORMAL — SIGNIFICANT CHANGE UP
WBC # BLD: 16.49 K/UL — HIGH (ref 3.8–10.5)
WBC # BLD: 18.13 K/UL — HIGH (ref 3.8–10.5)
WBC # FLD AUTO: 16.49 K/UL — HIGH (ref 3.8–10.5)
WBC # FLD AUTO: 18.13 K/UL — HIGH (ref 3.8–10.5)
WBC UR QL: SIGNIFICANT CHANGE UP (ref 0–?)

## 2019-02-01 PROCEDURE — 99233 SBSQ HOSP IP/OBS HIGH 50: CPT

## 2019-02-01 PROCEDURE — 71045 X-RAY EXAM CHEST 1 VIEW: CPT | Mod: 26

## 2019-02-01 RX ORDER — DEXTROSE 50 % IN WATER 50 %
15 SYRINGE (ML) INTRAVENOUS ONCE
Qty: 0 | Refills: 0 | Status: DISCONTINUED | OUTPATIENT
Start: 2019-02-01 | End: 2019-02-01

## 2019-02-01 RX ORDER — ALBUMIN HUMAN 25 %
50 VIAL (ML) INTRAVENOUS ONCE
Qty: 0 | Refills: 0 | Status: COMPLETED | OUTPATIENT
Start: 2019-02-01 | End: 2019-02-01

## 2019-02-01 RX ORDER — DEXTROSE 50 % IN WATER 50 %
25 SYRINGE (ML) INTRAVENOUS ONCE
Qty: 0 | Refills: 0 | Status: DISCONTINUED | OUTPATIENT
Start: 2019-02-01 | End: 2019-02-01

## 2019-02-01 RX ORDER — POTASSIUM CHLORIDE 20 MEQ
10 PACKET (EA) ORAL
Qty: 0 | Refills: 0 | Status: COMPLETED | OUTPATIENT
Start: 2019-02-01 | End: 2019-02-01

## 2019-02-01 RX ORDER — GLUCAGON INJECTION, SOLUTION 0.5 MG/.1ML
1 INJECTION, SOLUTION SUBCUTANEOUS ONCE
Qty: 0 | Refills: 0 | Status: DISCONTINUED | OUTPATIENT
Start: 2019-02-01 | End: 2019-02-01

## 2019-02-01 RX ORDER — IBUPROFEN 200 MG
600 TABLET ORAL ONCE
Qty: 0 | Refills: 0 | Status: COMPLETED | OUTPATIENT
Start: 2019-02-01 | End: 2019-02-02

## 2019-02-01 RX ORDER — DEXTROSE 50 % IN WATER 50 %
12.5 SYRINGE (ML) INTRAVENOUS ONCE
Qty: 0 | Refills: 0 | Status: DISCONTINUED | OUTPATIENT
Start: 2019-02-01 | End: 2019-02-01

## 2019-02-01 RX ORDER — SODIUM CHLORIDE 9 MG/ML
1000 INJECTION, SOLUTION INTRAVENOUS
Qty: 0 | Refills: 0 | Status: DISCONTINUED | OUTPATIENT
Start: 2019-02-01 | End: 2019-02-01

## 2019-02-01 RX ORDER — FUROSEMIDE 40 MG
40 TABLET ORAL ONCE
Qty: 0 | Refills: 0 | Status: COMPLETED | OUTPATIENT
Start: 2019-02-01 | End: 2019-02-01

## 2019-02-01 RX ORDER — INSULIN LISPRO 100/ML
VIAL (ML) SUBCUTANEOUS
Qty: 0 | Refills: 0 | Status: DISCONTINUED | OUTPATIENT
Start: 2019-02-01 | End: 2019-02-02

## 2019-02-01 RX ORDER — ACETAMINOPHEN 500 MG
1000 TABLET ORAL ONCE
Qty: 0 | Refills: 0 | Status: COMPLETED | OUTPATIENT
Start: 2019-02-01 | End: 2019-02-01

## 2019-02-01 RX ADMIN — Medication 650 MILLIGRAM(S): at 20:43

## 2019-02-01 RX ADMIN — Medication 1 DROP(S): at 17:45

## 2019-02-01 RX ADMIN — Medication 50 MILLILITER(S): at 11:32

## 2019-02-01 RX ADMIN — CHLORHEXIDINE GLUCONATE 1 APPLICATION(S): 213 SOLUTION TOPICAL at 11:34

## 2019-02-01 RX ADMIN — Medication 40 MILLIGRAM(S): at 11:33

## 2019-02-01 RX ADMIN — Medication 1 DROP(S): at 05:46

## 2019-02-01 RX ADMIN — Medication 400 MILLIGRAM(S): at 00:30

## 2019-02-01 RX ADMIN — QUETIAPINE FUMARATE 50 MILLIGRAM(S): 200 TABLET, FILM COATED ORAL at 21:47

## 2019-02-01 RX ADMIN — Medication 100 MILLIEQUIVALENT(S): at 05:45

## 2019-02-01 RX ADMIN — Medication 100 MILLIEQUIVALENT(S): at 07:49

## 2019-02-01 RX ADMIN — Medication 650 MILLIGRAM(S): at 20:00

## 2019-02-01 RX ADMIN — ENOXAPARIN SODIUM 40 MILLIGRAM(S): 100 INJECTION SUBCUTANEOUS at 11:32

## 2019-02-01 RX ADMIN — Medication 650 MILLIGRAM(S): at 12:00

## 2019-02-01 RX ADMIN — Medication 650 MILLIGRAM(S): at 11:30

## 2019-02-01 RX ADMIN — Medication 100 MILLIEQUIVALENT(S): at 09:33

## 2019-02-01 NOTE — PROGRESS NOTE ADULT - ASSESSMENT
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  - External tissue expander device to remain in place until sufficient skin laxity to allow for skin closure. Do not remove or otherwise touch this device. It will automatically tighten on its own without external intervention. It will also maintain appropriate tension on the wound over time.   - Nutritional optimization.  - Medical optimization.  - Continue BID packing to the abdominal wound (around the external tissue expander device) with normal-saline-moistened Kerlix gauze.  - Care per primary    Plastic Surgery (klaudia DICKENS: [id number], NS: [phone])

## 2019-02-01 NOTE — CHART NOTE - NSCHARTNOTEFT_GEN_A_CORE
: Vincent    INDICATION: SOB    PROCEDURE:  [ ] LIMITED ECHO  [x] LIMITED CHEST  [ ] LIMITED RETROPERITONEAL  [ ] LIMITED ABDOMINAL  [ ] LIMITED DVT  [ ] NEEDLE GUIDANCE VASCULAR  [ ] NEEDLE GUIDANCE THORACENTESIS  [ ] NEEDLE GUIDANCE PARACENTESIS  [ ] NEEDLE GUIDANCE PERICARDIOCENTESIS  [ ] OTHER    FINDINGS: multiple B lines in bilateral lung fields, slightly improved from yesterday. : Vincent    INDICATION: SOB    PROCEDURE:  [ ] LIMITED ECHO  [x] LIMITED CHEST  [ ] LIMITED RETROPERITONEAL  [ ] LIMITED ABDOMINAL  [ ] LIMITED DVT  [ ] NEEDLE GUIDANCE VASCULAR  [ ] NEEDLE GUIDANCE THORACENTESIS  [ ] NEEDLE GUIDANCE PARACENTESIS  [ ] NEEDLE GUIDANCE PERICARDIOCENTESIS  [ ] OTHER    FINDINGS: multiple B lines in bilateral lung fields, slightly improved from yesterday.  Agree

## 2019-02-01 NOTE — PROGRESS NOTE ADULT - ASSESSMENT
54y Female PMHx HTN, T2DM, asthma, depression with recent diagnosis of invasive ampullary adenocarcinoma of the CBD admitted for cholangitis on 1/5, s/p ERCP 1/8, whipple 1/11, rapid response for GIB on floor 1/18 s/p MTP, RTOR s/p celiotomy, evacuation of hematoma, GDA bleed stopped and sutured, Abthera vac. Closure of abdomen with strattice mesh on 1/21, with inna drains and left lower JOURDAN drain in subcutaneous. Incisional bleed 1/23, bedside skin staples removed, JOURDAN drain removed, hemorrhage controlled, for continuation of ICU care.     PLAN:  -pain control as needed  -lovenox for DVT ppx  -PT/oob  -c/w full liquid diet  -monitor for delirium  -monitor VS  -appreciate SICU care    D Team Surgery  [id number]

## 2019-02-01 NOTE — CHART NOTE - NSCHARTNOTEFT_GEN_A_CORE
Source: Patient ,    Family , nursing, EMR, team    Diet : Full Fluid Consistent Carbohydrate ( Evening Snack )     Patient reports dislike to current diet, poor desire to eat , family @ bed side reporting they supplemented food from home and still pt. with minimal PO intake , attempted to elicit food preferences/ liking but with poor success, offered PO supplement , agreed by family and noted the preference for vanilla flavor , met with medical team and discussed nutrition recommendation . Encouraged pt. and family for increased PO nutrition and need for nutrition to facilitate recovery / healing .      Current Weight: - 61.8 kg on 2/1/19; 74.9 kg on 1/24/19; Admit wt. - 57.4 kg ; UBW - 55.7 kg     Pertinent Medications: MEDICATIONS  (STANDING):  artificial  tears Solution 1 Drop(s) Both EYES every 12 hours  chlorhexidine 4% Liquid 1 Application(s) Topical <User Schedule>  enoxaparin Injectable 40 milliGRAM(s) SubCutaneous daily  insulin lispro (HumaLOG) corrective regimen sliding scale   SubCutaneous three times a day before meals  QUEtiapine 50 milliGRAM(s) Oral at bedtime    MEDICATIONS  (PRN):  acetaminophen    Suspension .. 650 milliGRAM(s) Oral every 6 hours PRN Moderate Pain (4 - 6)  HYDROmorphone  Injectable 0.5 milliGRAM(s) IV Push every 4 hours PRN Pain unrelieved by PO  oxyCODONE    IR 5 milliGRAM(s) Oral every 4 hours PRN Moderate Pain (4 - 6)  oxyCODONE    IR 10 milliGRAM(s) Oral every 4 hours PRN Severe Pain (7 - 10)    Pertinent Labs:  02-01 Na133 mmol/L<L> Glu 141 mg/dL<H> K+ 3.5 mmol/L Cr  0.62 mg/dL BUN 18 mg/dL 02-01 Phos 3.2 mg/dL 02-01 Alb 2.3 g/dL<L> 01-28 PAB 7 mg/dL<L>      New Nutrition Diagnosis: -  Inadequate Protein Energy Intake ; Inadequate Oral Intake     Recommend Advancing diet to solids and PO supplement - Glucerna Shake as tolerated     Monitoring and Evaluation:  PO intake , Tolerance to diet prescription , weights and  follow up per protocol

## 2019-02-01 NOTE — PROGRESS NOTE ADULT - ATTENDING COMMENTS
54y Female PMHx HTN, T2DM, asthma, depression with recent diagnosis of invasive ampullary adenocarcinoma of the CBD admitted for cholangitis on 1/5, s/p ERCP 1/8, whipple 1/11, rapid response for GIB on floor 1/18 s/p MTP, RTOR s/p celiotomy, evacuation of hematoma, GDA bleed stopped and sutured, Abthera vac. Closure of abdomen with strattice mesh on 1/21, with inna drains and left lower JOURDAN drain in subcutaneous. Incisional bleed 1/23, bedside skin staples removed, JOURDAN drain removed, hemorrhage controlled, for continuation of ICU care.     Neuro: ICU delirium   -Tylenol & Oxy and dilaudid PRN for pain  -Seroquel 50 for sleep (Was held last night as she did not need it to sleep)    Resp:  -Extubated  -continue continuous pulse ox monitoring  -maintain O2 >94%  -2LNC as needed   -Lung US was done - Still has B lines    CV:  - Patient with hypotensive episode s/p labetalol. Further doses held until BP improves    GI:  - CLD as tolerated  - TPN will be stopped as central line is removed  - Bowel regimen  - loose stools noted today with cdiff negative  - abdominal collection, will repeat CT on 2/3, possible IR 2/4 if drainable collection     Renal:  - Strict I&O, monitor lytes, replete as needed   - IVF on hold, receiving TPN  -Albumin 50, Lasix 40 for diuresis    Endo:  - diabetic, FS q6h and low dose corrective regimen for hyperglycemia  - TPN with insulin     Heme:   -monitor H/H- crit dropped - will repeat CBC  -ct lovenox vte ppx    ID: Febrile to 101.6  - all antibiotics discontinued   - c-diff negative  -Will get pan cultures  - continue to trend WBC  - rpt CT abd/pelv 2/3 for possible drainable collection     Access: Right AC 18G extended dwell peripheral IV, Left forearm 20G PIV     Dispo: SICU (Can go to floor tomorrow or today if bed is needed)    CC Diagnosis: Respiratory distress, fluid overload, malnutrition    The patient is a critical care patient with life threatening hemodynamic and metabolic instability in SICU.  I have personally interviewed when possible and examined the patient, reviewed data and laboratory tests/x-rays and all pertinent electronic images.  I was physically present for the key portions of the evaluation and management (E/M) service provided.   The SICU team has a constant risk benefit analyzes discussion with the primary team, all consultants, House Staff and PA's on all decisions.  These diagnoses are unrelated to the surgical procedure noted above.  I meet with family if needed to get further history, discuss the case and make care decisions for this patient who might not be able to participate.  Time involved in performance of separately billable procedures was not counted toward my critical care time. There is no overlap.  I spent 55-75 minutes of critical care time for the diagnoses, assessment, plan and interventions.

## 2019-02-01 NOTE — PROGRESS NOTE ADULT - SUBJECTIVE AND OBJECTIVE BOX
SICU PM PROGRESS NOTE  ===============================  Interval Events: Patient with continued intermittent delirium with seroquel increased to 50mg from 25. Patient started on labetalol 200mg however, with hypotensive episode in PM shortly after administration to 90s/30s. Patient given 250ml albumin.      HPI  54y Female PMHx HTN, T2DM, asthma, depression with recent diagnosis of invasive ampullary adenocarcinoma of the CBD admitted for cholangitis on 1/5, s/p ERCP 1/8, whipple 1/11, rapid response for GIB on floor 1/18 s/p MTP, RTOR s/p celiotomy, evacuation of hematoma, GDA bleed stopped and sutured, Abthera vac. Closure of abdomen with strattice mesh on 1/21, with inna drains and left lower JOURDAN drain in subcutaneous, s/p incisional bleed 1/23, bedside skin staples removed, JOURDAN drain removed, hemorrhage controlled, for continuation of ICU care for respiratory failure     VITAL SIGNS, INS/OUTS  ICU Vital Signs Last 24 Hrs  T(C): 37.2 (30 Jan 2019 12:00), Max: 38.3 (30 Jan 2019 00:00)  T(F): 99 (30 Jan 2019 12:00), Max: 100.9 (30 Jan 2019 00:00)  HR: 93 (30 Jan 2019 12:00) (85 - 112)  BP: --  BP(mean): --  ABP: 160/68 (30 Jan 2019 12:00) (120/63 - 190/88)  ABP(mean): 107 (30 Jan 2019 12:00) (87 - 133)  RR: 32 (30 Jan 2019 12:00) (23 - 32)  SpO2: 99% (30 Jan 2019 12:00) (93% - 99%)    --------------------------------------------------------------------------------------  I&O's Detail    29 Jan 2019 07:01  -  30 Jan 2019 07:00  --------------------------------------------------------  IN:    dexmedetomidine Infusion: 20 mL    fat emulsion (Fish Oil and Plant Based) 20% Infusion: 187.5 mL    IV PiggyBack: 750 mL    TPN (Total Parenteral Nutrition): 1992 mL  Total IN: 2949.5 mL    OUT:    Bulb: 235 mL    Bulb: 155 mL    Indwelling Catheter - Urethral: 3305 mL    Nasoenteral Tube: 100 mL  Total OUT: 3795 mL    Total NET: -845.5 mL      30 Jan 2019 07:01  -  30 Jan 2019 14:04  --------------------------------------------------------  IN:    dextrose 10%.: 225 mL    TPN (Total Parenteral Nutrition): 332 mL  Total IN: 557 mL    OUT:    Indwelling Catheter - Urethral: 1875 mL  Total OUT: 1875 mL    Total NET: -1318 mL        --------------------------------------------------------------------------------------    EXAM  NEUROLOGY  RASS: 0 - -2  Exam: NAD, Awake and alert, fluctuating orientation  HEENT  Exam: Normocephalic, atraumatic, EOMI  RESPIRATORY  Exam: Scattered b/l wheeze, Rales at bases. Saturating adequately on NC  CARDIOVASCULAR  Exam: S1, S2.  Regular rate and rhythm. + Peripheral edema   GI/NUTRITION  Exam: Abdomen soft, Non-tender, Non-distended. JOURDAN with bilious output, LLQ JOURDAN with dark/serous output, no rebound or guarding  Current Diet:   VASCULAR  Exam: Extremities warm, pink, well-perfused.   MUSCULOSKELETAL  Exam: All extremities moving spontaneously without limitations.   SKIN  Exam: Good skin turgor, no skin breakdown.     METABOLIC/FLUIDS/ELECTROLYTES  dextrose 10%. 1000 milliLiter(s) IV Continuous <Continuous>      HEMATOLOGIC  [x] DVT Prophylaxis: enoxaparin Injectable 40 milliGRAM(s) SubCutaneous daily    Transfusions:	[] PRBC	[] Platelets		[] FFP	[] Cryoprecipitate    INFECTIOUS DISEASE  Antimicrobials/Immunologic Medications:  Vancomycin, Meropenem, Micafungin    LABS  --------------------------------------------------------------------------------------                        8.6    14.86 )-----------( 319      ( 30 Jan 2019 03:00 )             25.9     --------------------------------------------------------------------------------------    IMAGING STUDIES:  CT A/P pending official read    Disposition:  SICU

## 2019-02-01 NOTE — PROGRESS NOTE ADULT - ASSESSMENT
54y Female PMHx HTN, T2DM, asthma, depression with recent diagnosis of invasive ampullary adenocarcinoma of the CBD admitted for cholangitis on 1/5, s/p ERCP 1/8, whipple 1/11, rapid response for GIB on floor 1/18 s/p MTP, RTOR s/p celiotomy, evacuation of hematoma, GDA bleed stopped and sutured, Abthera vac. Closure of abdomen with strattice mesh on 1/21, with inna drains and left lower JOURDAN drain in subcutaneous. Incisional bleed 1/23, bedside skin staples removed, JOURDAN drain removed, hemorrhage controlled, for continuation of ICU care.     Neuro: ICU delirium   -Tylenol & Oxy and dilaudid PRN for pain  -Seroquel 50 for sleep    Resp:  -Extubated  -continue continuous pulse ox monitoring  -maintain O2 >94%  -2LNC as needed     CV:  - Patient with hypotensive episode s/p labetalol. Further doses held until BP improves    GI:  - CLD as tolerated  - TPN will be stopped as central line is removed  - Bowel regimen  - loose stools noted today with cdiff negative  - abdominal collection, will repeat CT on 2/3, possible IR 2/4 if drainable collection     Renal:  - Strict I&O, monitor lytes, replete as needed   - IVF on hold, receiving TPN    Endo:  - diabetic, FS q6h and low dose corrective regimen for hyperglycemia  - TPN with insulin     Heme:   -monitor H/H, stable   -ct lovenox vte ppx    ID:   - all antibiotics discontinued   - c-diff negative  - continue to trend WBC  - rpt CT abd/pelv 2/3 for possible drainable collection     Access: Right AC 18G extended dwell peripheral IV, Left forearm 20G PIV     Dispo: SICU    CC Diagnosis: Respiratory distress, fluid overload, malnutrition 54y Female PMHx HTN, T2DM, asthma, depression with recent diagnosis of invasive ampullary adenocarcinoma of the CBD admitted for cholangitis on 1/5, s/p ERCP 1/8, whipple 1/11, rapid response for GIB on floor 1/18 s/p MTP, RTOR s/p celiotomy, evacuation of hematoma, GDA bleed stopped and sutured, Abthera vac. Closure of abdomen with strattice mesh on 1/21, with inna drains and left lower JOURDAN drain in subcutaneous. Incisional bleed 1/23, bedside skin staples removed, JOURDAN drain removed, hemorrhage controlled, for continuation of ICU care.     Neuro: ICU delirium   -Tylenol & Oxy and dilaudid PRN for pain  -Seroquel 50 for sleep (Was held last night as she did not need it to sleep)    Resp:  -Extubated  -continue continuous pulse ox monitoring  -maintain O2 >94%  -2LNC as needed   -Lung US was done - Still has B lines    CV:  - Patient with hypotensive episode s/p labetalol. Further doses held until BP improves    GI:  - CLD as tolerated  - TPN will be stopped as central line is removed  - Bowel regimen  - loose stools noted today with cdiff negative  - abdominal collection, will repeat CT on 2/3, possible IR 2/4 if drainable collection     Renal:  - Strict I&O, monitor lytes, replete as needed   - IVF on hold, receiving TPN  -Albumin 50, Lasix 40 for diuresis    Endo:  - diabetic, FS q6h and low dose corrective regimen for hyperglycemia  - TPN with insulin     Heme:   -monitor H/H- crit dropped - will repeat CBC  -ct lovenox vte ppx    ID: Febrile to 101.6  - all antibiotics discontinued   - c-diff negative  - continue to trend WBC  - rpt CT abd/pelv 2/3 for possible drainable collection     Access: Right AC 18G extended dwell peripheral IV, Left forearm 20G PIV     Dispo: SICU (Can go to floor tomorrow or today if bed is needed)    CC Diagnosis: Respiratory distress, fluid overload, malnutrition 54y Female PMHx HTN, T2DM, asthma, depression with recent diagnosis of invasive ampullary adenocarcinoma of the CBD admitted for cholangitis on 1/5, s/p ERCP 1/8, whipple 1/11, rapid response for GIB on floor 1/18 s/p MTP, RTOR s/p celiotomy, evacuation of hematoma, GDA bleed stopped and sutured, Abthera vac. Closure of abdomen with strattice mesh on 1/21, with inna drains and left lower JOURDAN drain in subcutaneous. Incisional bleed 1/23, bedside skin staples removed, JOURDAN drain removed, hemorrhage controlled, for continuation of ICU care.     Neuro: ICU delirium   -Tylenol & Oxy and dilaudid PRN for pain  -Seroquel 50 for sleep (Was held last night as she did not need it to sleep)    Resp:  -Extubated  -continue continuous pulse ox monitoring  -maintain O2 >94%  -2LNC as needed   -Lung US was done - Still has B lines    CV:  - Patient with hypotensive episode s/p labetalol. Further doses held until BP improves    GI:  - CLD as tolerated  - TPN will be stopped as central line is removed  - Bowel regimen  - loose stools noted today with cdiff negative  - abdominal collection, will repeat CT on 2/3, possible IR 2/4 if drainable collection     Renal:  - Strict I&O, monitor lytes, replete as needed   - IVF on hold, receiving TPN  -Albumin 50, Lasix 40 for diuresis    Endo:  - diabetic, FS q6h and low dose corrective regimen for hyperglycemia  - TPN with insulin     Heme:   -monitor H/H- crit dropped - will repeat CBC  -ct lovenox vte ppx    ID: Febrile to 101.6  - all antibiotics discontinued   - c-diff negative  -Will get pan cultures  - continue to trend WBC  - rpt CT abd/pelv 2/3 for possible drainable collection     Access: Right AC 18G extended dwell peripheral IV, Left forearm 20G PIV     Dispo: SICU (Can go to floor tomorrow or today if bed is needed)    CC Diagnosis: Respiratory distress, fluid overload, malnutrition

## 2019-02-01 NOTE — PROGRESS NOTE ADULT - SUBJECTIVE AND OBJECTIVE BOX
Surgery Progress Note    S: Patient seen and examined. Patient was febrile o/n. Patient continued to have delirium yesterday and had seroquel dose increased. This AM, patient's pain is well controlled. No n/v.     O:  Vital Signs Last 24 Hrs  T(C): 37.3 (01 Feb 2019 04:00), Max: 38.5 (31 Jan 2019 16:00)  T(F): 99.2 (01 Feb 2019 04:00), Max: 101.3 (31 Jan 2019 16:00)  HR: 89 (01 Feb 2019 06:00) (75 - 106)  BP: 100/55 (01 Feb 2019 06:00) (91/31 - 146/106)  BP(mean): 64 (01 Feb 2019 06:00) (46 - 116)  RR: 25 (01 Feb 2019 06:00) (18 - 31)  SpO2: 95% (01 Feb 2019 06:00) (93% - 100%)    I&O's Detail    31 Jan 2019 07:01  -  01 Feb 2019 07:00  --------------------------------------------------------  IN:    Albumin 5%  - 250 mL: 250 mL    dextrose 5% + sodium chloride 0.45%.: 300 mL    IV PiggyBack: 500 mL    Oral Fluid: 470 mL  Total IN: 1520 mL    OUT:    Bulb: 85 mL    Bulb: 205 mL    Rectal Tube: 200 mL    Voided: 850 mL  Total OUT: 1340 mL    Total NET: 180 mL          MEDICATIONS  (STANDING):  artificial  tears Solution 1 Drop(s) Both EYES every 12 hours  chlorhexidine 4% Liquid 1 Application(s) Topical <User Schedule>  enoxaparin Injectable 40 milliGRAM(s) SubCutaneous daily  insulin lispro (HumaLOG) corrective regimen sliding scale   SubCutaneous three times a day before meals  potassium chloride  10 mEq/100 mL IVPB 10 milliEquivalent(s) IV Intermittent every 1 hour  QUEtiapine 50 milliGRAM(s) Oral at bedtime    MEDICATIONS  (PRN):  acetaminophen    Suspension .. 650 milliGRAM(s) Oral every 6 hours PRN Moderate Pain (4 - 6)  HYDROmorphone  Injectable 0.5 milliGRAM(s) IV Push every 4 hours PRN Pain unrelieved by PO  oxyCODONE    IR 5 milliGRAM(s) Oral every 4 hours PRN Moderate Pain (4 - 6)  oxyCODONE    IR 10 milliGRAM(s) Oral every 4 hours PRN Severe Pain (7 - 10)                            7.5    16.49 )-----------( 336      ( 01 Feb 2019 04:00 )             22.4       02-01    133<L>  |  99  |  18  ----------------------------<  141<H>  3.5   |  22  |  0.62    Ca    7.9<L>      01 Feb 2019 04:00  Phos  3.2     02-01  Mg     1.9     02-01    TPro  5.8<L>  /  Alb  2.3<L>  /  TBili  3.3<H>  /  DBili  2.2<H>  /  AST  81<H>  /  ALT  43<H>  /  AlkPhos  792<H>  02-01      Physical Exam:  Gen: Laying in bed, NAD  Resp: Unlabored breathing  Abd: soft, NTND, midline incision re-packed, RUQ and LLQ JOURDAN bilious, no rebound or guarding  Ext: WWP  Skin: No rashes

## 2019-02-01 NOTE — PROGRESS NOTE ADULT - SUBJECTIVE AND OBJECTIVE BOX
Plastic Surgery Progress Note (pg [facility]: [id number], NS: [phone])    SUBJECTIVE:  The patient was seen and examined. Febrile overnight. Pain controlled this morning.    OBJECTIVE:     ** VITAL SIGNS / I&O's **    Vital Signs Last 24 Hrs  T(C): 37.3 (01 Feb 2019 04:00), Max: 38.5 (31 Jan 2019 16:00)  T(F): 99.2 (01 Feb 2019 04:00), Max: 101.3 (31 Jan 2019 16:00)  HR: 89 (01 Feb 2019 06:00) (75 - 106)  BP: 100/55 (01 Feb 2019 06:00) (91/31 - 146/106)  BP(mean): 64 (01 Feb 2019 06:00) (46 - 116)  RR: 25 (01 Feb 2019 06:00) (18 - 31)  SpO2: 95% (01 Feb 2019 06:00) (93% - 100%)      31 Jan 2019 07:01  -  01 Feb 2019 07:00  --------------------------------------------------------  IN:    Albumin 5%  - 250 mL: 250 mL    dextrose 5% + sodium chloride 0.45%.: 300 mL    IV PiggyBack: 500 mL    Oral Fluid: 470 mL  Total IN: 1520 mL    OUT:    Bulb: 85 mL    Bulb: 205 mL    Rectal Tube: 200 mL    Voided: 850 mL  Total OUT: 1340 mL    Total NET: 180 mL          ** PHYSICAL EXAM **    -- CONSTITUTIONAL: Alert, NAD   -- PULM: non-labored respirations  -- ABDOMEN: soft, midline abdominal wound with external tissue expander in place, ~3cm wide defect, no evidence of drainage or malodor, packing in place    ** LABS **                          7.5    16.49 )-----------( 336      ( 01 Feb 2019 04:00 )             22.4     01 Feb 2019 04:00    133    |  99     |  18     ----------------------------<  141    3.5     |  22     |  0.62     Ca    7.9        01 Feb 2019 04:00  Phos  3.2       01 Feb 2019 04:00  Mg     1.9       01 Feb 2019 04:00    TPro  5.8    /  Alb  2.3    /  TBili  3.3    /  DBili  2.2    /  AST  81     /  ALT  43     /  AlkPhos  792    01 Feb 2019 04:00    PT/INR - ( 31 Jan 2019 08:15 )   PT: 18.8 SEC;   INR: 1.62          PTT - ( 31 Jan 2019 08:15 )  PTT:31.0 SEC  CAPILLARY BLOOD GLUCOSE      POCT Blood Glucose.: 137 mg/dL (01 Feb 2019 07:40)  POCT Blood Glucose.: 139 mg/dL (31 Jan 2019 17:48)  POCT Blood Glucose.: 146 mg/dL (31 Jan 2019 11:50)          Recent Cultures:        ** MEDICATIONS **  MEDICATIONS  (STANDING):  artificial  tears Solution 1 Drop(s) Both EYES every 12 hours  chlorhexidine 4% Liquid 1 Application(s) Topical <User Schedule>  enoxaparin Injectable 40 milliGRAM(s) SubCutaneous daily  insulin lispro (HumaLOG) corrective regimen sliding scale   SubCutaneous three times a day before meals  potassium chloride  10 mEq/100 mL IVPB 10 milliEquivalent(s) IV Intermittent every 1 hour  QUEtiapine 50 milliGRAM(s) Oral at bedtime    MEDICATIONS  (PRN):  acetaminophen    Suspension .. 650 milliGRAM(s) Oral every 6 hours PRN Moderate Pain (4 - 6)  HYDROmorphone  Injectable 0.5 milliGRAM(s) IV Push every 4 hours PRN Pain unrelieved by PO  oxyCODONE    IR 5 milliGRAM(s) Oral every 4 hours PRN Moderate Pain (4 - 6)  oxyCODONE    IR 10 milliGRAM(s) Oral every 4 hours PRN Severe Pain (7 - 10)

## 2019-02-02 LAB
ALBUMIN SERPL ELPH-MCNC: 2.3 G/DL — LOW (ref 3.3–5)
ALP SERPL-CCNC: 638 U/L — HIGH (ref 40–120)
ALT FLD-CCNC: 40 U/L — HIGH (ref 4–33)
ANION GAP SERPL CALC-SCNC: 18 MMO/L — HIGH (ref 7–14)
APTT BLD: 26.9 SEC — LOW (ref 27.5–36.3)
AST SERPL-CCNC: 88 U/L — HIGH (ref 4–32)
BASE EXCESS BLDA CALC-SCNC: -1.3 MMOL/L — SIGNIFICANT CHANGE UP
BASE EXCESS BLDA CALC-SCNC: -4 MMOL/L — SIGNIFICANT CHANGE UP
BILIRUB DIRECT SERPL-MCNC: 1.1 MG/DL — HIGH (ref 0.1–0.2)
BILIRUB SERPL-MCNC: 3.3 MG/DL — HIGH (ref 0.2–1.2)
BUN SERPL-MCNC: 24 MG/DL — HIGH (ref 7–23)
CA-I BLD-SCNC: 0.87 MMOL/L — LOW (ref 1.03–1.23)
CA-I BLDA-SCNC: 1.14 MMOL/L — LOW (ref 1.15–1.29)
CALCIUM SERPL-MCNC: 7.8 MG/DL — LOW (ref 8.4–10.5)
CHLORIDE BLDA-SCNC: 103 MMOL/L — SIGNIFICANT CHANGE UP (ref 96–108)
CHLORIDE SERPL-SCNC: 97 MMOL/L — LOW (ref 98–107)
CO2 SERPL-SCNC: 18 MMOL/L — LOW (ref 22–31)
CREAT SERPL-MCNC: 0.65 MG/DL — SIGNIFICANT CHANGE UP (ref 0.5–1.3)
GLUCOSE BLDA-MCNC: 116 MG/DL — HIGH (ref 70–99)
GLUCOSE BLDA-MCNC: 145 MG/DL — HIGH (ref 70–99)
GLUCOSE SERPL-MCNC: 155 MG/DL — HIGH (ref 70–99)
HCO3 BLDA-SCNC: 21 MMOL/L — LOW (ref 22–26)
HCO3 BLDA-SCNC: SIGNIFICANT CHANGE UP MMOL/L (ref 22–26)
HCT VFR BLD CALC: 25.9 % — LOW (ref 34.5–45)
HCT VFR BLDA CALC: 25.9 % — LOW (ref 34.5–46.5)
HCT VFR BLDA CALC: SIGNIFICANT CHANGE UP % (ref 34.5–46.5)
HGB BLD-MCNC: 8.3 G/DL — LOW (ref 11.5–15.5)
HGB BLDA-MCNC: 8.3 G/DL — LOW (ref 11.5–15.5)
HGB BLDA-MCNC: SIGNIFICANT CHANGE UP G/DL (ref 11.5–15.5)
INR BLD: 2.01 — HIGH (ref 0.88–1.17)
LACTATE BLDA-SCNC: 2.1 MMOL/L — HIGH (ref 0.5–2)
LACTATE BLDA-SCNC: 4.1 MMOL/L — CRITICAL HIGH (ref 0.5–2)
MAGNESIUM SERPL-MCNC: 1.9 MG/DL — SIGNIFICANT CHANGE UP (ref 1.6–2.6)
MCHC RBC-ENTMCNC: 27.1 PG — SIGNIFICANT CHANGE UP (ref 27–34)
MCHC RBC-ENTMCNC: 32 % — SIGNIFICANT CHANGE UP (ref 32–36)
MCV RBC AUTO: 84.6 FL — SIGNIFICANT CHANGE UP (ref 80–100)
NRBC # FLD: 0.04 K/UL — LOW (ref 25–125)
PCO2 BLDA: 24 MMHG — LOW (ref 32–48)
PCO2 BLDA: 28 MMHG — LOW (ref 32–48)
PH BLDA: 7.46 PH — HIGH (ref 7.35–7.45)
PH BLDA: 7.56 PH — HIGH (ref 7.35–7.45)
PHOSPHATE SERPL-MCNC: 2.8 MG/DL — SIGNIFICANT CHANGE UP (ref 2.5–4.5)
PLATELET # BLD AUTO: 465 K/UL — HIGH (ref 150–400)
PMV BLD: 10.3 FL — SIGNIFICANT CHANGE UP (ref 7–13)
PO2 BLDA: 120 MMHG — HIGH (ref 83–108)
PO2 BLDA: 84 MMHG — SIGNIFICANT CHANGE UP (ref 83–108)
POTASSIUM BLDA-SCNC: 3.6 MMOL/L — SIGNIFICANT CHANGE UP (ref 3.4–4.5)
POTASSIUM BLDA-SCNC: 3.9 MMOL/L — SIGNIFICANT CHANGE UP (ref 3.4–4.5)
POTASSIUM SERPL-MCNC: 4.8 MMOL/L — SIGNIFICANT CHANGE UP (ref 3.5–5.3)
POTASSIUM SERPL-SCNC: 4.8 MMOL/L — SIGNIFICANT CHANGE UP (ref 3.5–5.3)
PROT SERPL-MCNC: 6.2 G/DL — SIGNIFICANT CHANGE UP (ref 6–8.3)
PROTHROM AB SERPL-ACNC: 22.8 SEC — HIGH (ref 9.8–13.1)
RBC # BLD: 3.06 M/UL — LOW (ref 3.8–5.2)
RBC # FLD: 17.8 % — HIGH (ref 10.3–14.5)
SAO2 % BLDA: 96.9 % — SIGNIFICANT CHANGE UP (ref 95–99)
SAO2 % BLDA: SIGNIFICANT CHANGE UP % (ref 95–99)
SODIUM BLDA-SCNC: 131 MMOL/L — LOW (ref 136–146)
SODIUM BLDA-SCNC: 131 MMOL/L — LOW (ref 136–146)
SODIUM SERPL-SCNC: 133 MMOL/L — LOW (ref 135–145)
SPECIMEN SOURCE: SIGNIFICANT CHANGE UP
SPECIMEN SOURCE: SIGNIFICANT CHANGE UP
TROPONIN T, HIGH SENSITIVITY: 12 NG/L — SIGNIFICANT CHANGE UP (ref ?–14)
WBC # BLD: 16.85 K/UL — HIGH (ref 3.8–10.5)
WBC # FLD AUTO: 16.85 K/UL — HIGH (ref 3.8–10.5)

## 2019-02-02 PROCEDURE — 99291 CRITICAL CARE FIRST HOUR: CPT | Mod: 25

## 2019-02-02 PROCEDURE — 36215 PLACE CATHETER IN ARTERY: CPT

## 2019-02-02 PROCEDURE — XXXXX: CPT

## 2019-02-02 PROCEDURE — 36620 INSERTION CATHETER ARTERY: CPT

## 2019-02-02 PROCEDURE — 36556 INSERT NON-TUNNEL CV CATH: CPT

## 2019-02-02 PROCEDURE — 71045 X-RAY EXAM CHEST 1 VIEW: CPT | Mod: 26

## 2019-02-02 PROCEDURE — 74177 CT ABD & PELVIS W/CONTRAST: CPT | Mod: 26

## 2019-02-02 PROCEDURE — 99292 CRITICAL CARE ADDL 30 MIN: CPT | Mod: 25

## 2019-02-02 PROCEDURE — 76937 US GUIDE VASCULAR ACCESS: CPT | Mod: 26

## 2019-02-02 RX ORDER — NOREPINEPHRINE BITARTRATE/D5W 8 MG/250ML
0.05 PLASTIC BAG, INJECTION (ML) INTRAVENOUS
Qty: 16 | Refills: 0 | Status: DISCONTINUED | OUTPATIENT
Start: 2019-02-02 | End: 2019-02-03

## 2019-02-02 RX ORDER — MAGNESIUM SULFATE 500 MG/ML
1 VIAL (ML) INJECTION ONCE
Qty: 0 | Refills: 0 | Status: COMPLETED | OUTPATIENT
Start: 2019-02-02 | End: 2019-02-02

## 2019-02-02 RX ORDER — VANCOMYCIN HCL 1 G
1000 VIAL (EA) INTRAVENOUS ONCE
Qty: 0 | Refills: 0 | Status: COMPLETED | OUTPATIENT
Start: 2019-02-02 | End: 2019-02-02

## 2019-02-02 RX ORDER — VASOPRESSIN 20 [USP'U]/ML
0.04 INJECTION INTRAVENOUS
Qty: 100 | Refills: 0 | Status: DISCONTINUED | OUTPATIENT
Start: 2019-02-02 | End: 2019-02-03

## 2019-02-02 RX ORDER — ALBUMIN HUMAN 25 %
250 VIAL (ML) INTRAVENOUS ONCE
Qty: 0 | Refills: 0 | Status: COMPLETED | OUTPATIENT
Start: 2019-02-02 | End: 2019-02-02

## 2019-02-02 RX ORDER — MEROPENEM 1 G/30ML
1000 INJECTION INTRAVENOUS EVERY 8 HOURS
Qty: 0 | Refills: 0 | Status: DISCONTINUED | OUTPATIENT
Start: 2019-02-02 | End: 2019-03-03

## 2019-02-02 RX ORDER — PHENYLEPHRINE HYDROCHLORIDE 10 MG/ML
0.5 INJECTION INTRAVENOUS
Qty: 40 | Refills: 0 | Status: DISCONTINUED | OUTPATIENT
Start: 2019-02-02 | End: 2019-02-02

## 2019-02-02 RX ORDER — PHYTONADIONE (VIT K1) 5 MG
5 TABLET ORAL ONCE
Qty: 0 | Refills: 0 | Status: COMPLETED | OUTPATIENT
Start: 2019-02-02 | End: 2019-02-02

## 2019-02-02 RX ORDER — MEROPENEM 1 G/30ML
1000 INJECTION INTRAVENOUS EVERY 8 HOURS
Qty: 0 | Refills: 0 | Status: DISCONTINUED | OUTPATIENT
Start: 2019-02-02 | End: 2019-02-02

## 2019-02-02 RX ORDER — SODIUM CHLORIDE 9 MG/ML
1000 INJECTION, SOLUTION INTRAVENOUS ONCE
Qty: 0 | Refills: 0 | Status: COMPLETED | OUTPATIENT
Start: 2019-02-02 | End: 2019-02-02

## 2019-02-02 RX ORDER — MIDAZOLAM HYDROCHLORIDE 1 MG/ML
1 INJECTION, SOLUTION INTRAMUSCULAR; INTRAVENOUS ONCE
Qty: 0 | Refills: 0 | Status: DISCONTINUED | OUTPATIENT
Start: 2019-02-02 | End: 2019-02-02

## 2019-02-02 RX ORDER — SODIUM CHLORIDE 9 MG/ML
1000 INJECTION, SOLUTION INTRAVENOUS
Qty: 0 | Refills: 0 | Status: DISCONTINUED | OUTPATIENT
Start: 2019-02-02 | End: 2019-02-02

## 2019-02-02 RX ORDER — INSULIN LISPRO 100/ML
VIAL (ML) SUBCUTANEOUS EVERY 6 HOURS
Qty: 0 | Refills: 0 | Status: DISCONTINUED | OUTPATIENT
Start: 2019-02-02 | End: 2019-02-19

## 2019-02-02 RX ORDER — CALCIUM GLUCONATE 100 MG/ML
1 VIAL (ML) INTRAVENOUS ONCE
Qty: 0 | Refills: 0 | Status: COMPLETED | OUTPATIENT
Start: 2019-02-02 | End: 2019-02-02

## 2019-02-02 RX ORDER — SODIUM CHLORIDE 9 MG/ML
1000 INJECTION, SOLUTION INTRAVENOUS
Qty: 0 | Refills: 0 | Status: DISCONTINUED | OUTPATIENT
Start: 2019-02-02 | End: 2019-02-04

## 2019-02-02 RX ORDER — HYDROMORPHONE HYDROCHLORIDE 2 MG/ML
0.5 INJECTION INTRAMUSCULAR; INTRAVENOUS; SUBCUTANEOUS ONCE
Qty: 0 | Refills: 0 | Status: DISCONTINUED | OUTPATIENT
Start: 2019-02-02 | End: 2019-02-02

## 2019-02-02 RX ORDER — SODIUM CHLORIDE 9 MG/ML
250 INJECTION, SOLUTION INTRAVENOUS ONCE
Qty: 0 | Refills: 0 | Status: COMPLETED | OUTPATIENT
Start: 2019-02-02 | End: 2019-02-02

## 2019-02-02 RX ADMIN — Medication 200 GRAM(S): at 02:07

## 2019-02-02 RX ADMIN — Medication 2.69 MICROGRAM(S)/KG/MIN: at 23:16

## 2019-02-02 RX ADMIN — Medication 600 MILLIGRAM(S): at 00:13

## 2019-02-02 RX ADMIN — Medication 100 GRAM(S): at 02:06

## 2019-02-02 RX ADMIN — SODIUM CHLORIDE 1000 MILLILITER(S): 9 INJECTION, SOLUTION INTRAVENOUS at 17:56

## 2019-02-02 RX ADMIN — ENOXAPARIN SODIUM 40 MILLIGRAM(S): 100 INJECTION SUBCUTANEOUS at 17:56

## 2019-02-02 RX ADMIN — SODIUM CHLORIDE 50 MILLILITER(S): 9 INJECTION, SOLUTION INTRAVENOUS at 10:55

## 2019-02-02 RX ADMIN — VASOPRESSIN 2.4 UNIT(S)/MIN: 20 INJECTION INTRAVENOUS at 23:16

## 2019-02-02 RX ADMIN — SODIUM CHLORIDE 1000 MILLILITER(S): 9 INJECTION, SOLUTION INTRAVENOUS at 02:07

## 2019-02-02 RX ADMIN — Medication 1 DROP(S): at 06:02

## 2019-02-02 RX ADMIN — Medication 1 DROP(S): at 17:57

## 2019-02-02 RX ADMIN — QUETIAPINE FUMARATE 50 MILLIGRAM(S): 200 TABLET, FILM COATED ORAL at 21:33

## 2019-02-02 RX ADMIN — MEROPENEM 100 MILLIGRAM(S): 1 INJECTION INTRAVENOUS at 21:32

## 2019-02-02 RX ADMIN — Medication 125 MILLILITER(S): at 02:06

## 2019-02-02 RX ADMIN — MEROPENEM 100 MILLIGRAM(S): 1 INJECTION INTRAVENOUS at 14:18

## 2019-02-02 RX ADMIN — Medication 2.69 MICROGRAM(S)/KG/MIN: at 10:55

## 2019-02-02 RX ADMIN — CHLORHEXIDINE GLUCONATE 1 APPLICATION(S): 213 SOLUTION TOPICAL at 11:39

## 2019-02-02 RX ADMIN — Medication 250 MILLIGRAM(S): at 03:31

## 2019-02-02 RX ADMIN — PHENYLEPHRINE HYDROCHLORIDE 10.76 MICROGRAM(S)/KG/MIN: 10 INJECTION INTRAVENOUS at 02:07

## 2019-02-02 RX ADMIN — SODIUM CHLORIDE 50 MILLILITER(S): 9 INJECTION, SOLUTION INTRAVENOUS at 17:56

## 2019-02-02 RX ADMIN — Medication 101 MILLIGRAM(S): at 10:55

## 2019-02-02 RX ADMIN — SODIUM CHLORIDE 50 MILLILITER(S): 9 INJECTION, SOLUTION INTRAVENOUS at 23:16

## 2019-02-02 RX ADMIN — Medication 600 MILLIGRAM(S): at 00:53

## 2019-02-02 RX ADMIN — MEROPENEM 100 MILLIGRAM(S): 1 INJECTION INTRAVENOUS at 06:01

## 2019-02-02 RX ADMIN — VASOPRESSIN 2.4 UNIT(S)/MIN: 20 INJECTION INTRAVENOUS at 17:57

## 2019-02-02 RX ADMIN — HYDROMORPHONE HYDROCHLORIDE 0.5 MILLIGRAM(S): 2 INJECTION INTRAMUSCULAR; INTRAVENOUS; SUBCUTANEOUS at 12:53

## 2019-02-02 RX ADMIN — HYDROMORPHONE HYDROCHLORIDE 0.5 MILLIGRAM(S): 2 INJECTION INTRAMUSCULAR; INTRAVENOUS; SUBCUTANEOUS at 13:08

## 2019-02-02 RX ADMIN — Medication 1: at 06:01

## 2019-02-02 RX ADMIN — Medication 2.69 MICROGRAM(S)/KG/MIN: at 03:31

## 2019-02-02 NOTE — PROGRESS NOTE ADULT - SUBJECTIVE AND OBJECTIVE BOX
SICU PM PROGRESS NOTE  ===============================  Interval Events: Patient hypotensive overnight, started on pressors. Central and arterial lines placed. Given 5mg Vit K this morning for coagulopathy. CT A/P today showing peritonitis with multiple enhancing abscesses.     HPI  54y Female PMHx HTN, T2DM, asthma, depression with recent diagnosis of invasive ampullary adenocarcinoma of the CBD admitted for cholangitis on 1/5, s/p ERCP 1/8, whipple 1/11, rapid response for GIB on floor 1/18 s/p MTP, RTOR s/p celiotomy, evacuation of hematoma, GDA bleed stopped and sutured, Abthera vac. Closure of abdomen with strattice mesh on 1/21, with inna drains and left lower JOURDAN drain in subcutaneous, s/p incisional bleed 1/23, bedside skin staples removed, JOURDAN drain removed, hemorrhage controlled, for continuation of ICU care for respiratory failure. Patient remained febrile in ICU, de-lined, cultures sent. Hypotension overnight on 2/1, started on pressors, CT A/P ordered.    VITAL SIGNS, INS/OUTS (last 24 hours):   --------------------------------------------------------------------------------------  ICU Vital Signs Last 24 Hrs  T(C): 37.9 (02 Feb 2019 12:00), Max: 38.9 (01 Feb 2019 20:00)  T(F): 100.3 (02 Feb 2019 12:00), Max: 102 (01 Feb 2019 20:00)  HR: 88 (02 Feb 2019 14:00) (88 - 115)  BP: 105/46 (02 Feb 2019 12:00) (67/38 - 156/72)  BP(mean): 62 (02 Feb 2019 12:00) (29 - 91)  ABP: 168/64 (02 Feb 2019 14:00) (88/55 - 168/64)  ABP(mean): 97 (02 Feb 2019 14:00) (61 - 97)  RR: 22 (02 Feb 2019 14:00) (18 - 29)  SpO2: 95% (02 Feb 2019 14:00) (92% - 99%)    --------------------------------------------------------------------------------------    EXAM  NEUROLOGY  Exam: Somnolent but arousable and more oriented per family at bedside    HEENT  Exam: Normocephalic, atraumatic, EOMI.      RESPIRATORY  Exam: Lungs with some inspiratory crackles. Normal expansion/effort.     CARDIOVASCULAR  Exam: Tachycardic, regular rhythm    GI/NUTRITION  Exam: Abdomen soft with mild to moderate distended, NTTP, abdominal wound open and packed with external tissue expander in place, clean border, no purulent drainage noted or surrounding erythema, clear brown fluid in JOURDAN drains  Current Diet: NPO    VASCULAR  Exam: Extremities warm, pink, well-perfused.     MUSCULOSKELETAL  Exam: All extremities moving spontaneously without limitations.     SKIN  Exam: Good skin turgor, no skin breakdown.     METABOLIC/FLUIDS/ELECTROLYTES  lactated ringers. 1000 milliLiter(s) IV Continuous <Continuous>      HEMATOLOGIC  [x] DVT Prophylaxis: enoxaparin Injectable 40 milliGRAM(s) SubCutaneous daily    Transfusions:	[] PRBC	[] Platelets		[] FFP	[] Cryoprecipitate    INFECTIOUS DISEASE  Antimicrobials/Immunologic Medications:  meropenem  IVPB 1000 milliGRAM(s) IV Intermittent every 8 hours      LABS  --------------------------------------------------------------------------------------                        8.3    16.85 )-----------( 465      ( 02 Feb 2019 00:20 )             25.9   Prothrombin Time and INR, Plasma in AM (02.02.19 @ 00:20)    Prothrombin Time, Plasma: 22.8 SEC    INR: 2.01      --------------------------------------------------------------------------------------    IMAGING STUDIES  < from: CT Abdomen and Pelvis w/ IV Cont (02.02.19 @ 13:32) >  EXAM:  CT ABDOMEN AND PELVIS IC        PROCEDURE DATE:  Feb 2 2019         INTERPRETATION:  CLINICAL INFORMATION: Status post Whipple's procedure;   fever.  Sepsis.    COMPARISON: CT abdomen and pelvis dated 1/30/2019    PROCEDURE:   CT of the Abdomen and Pelvis was performed with intravenous contrast.   Intravenous contrast: 90 ml Omnipaque 350. 10 ml discarded.  Oral contrast: None.  Sagittal and coronal reformats were performed.    FINDINGS:    LOWER CHEST: Moderate left and small right pleural effusions with   adjacent atelectasis, slightly decreased in size from 1/30/2019.    LIVER: Within normal limits.  BILE DUCTS: Normal caliber. Biliary drain in place.  GALLBLADDER: Cholecystectomy.  SPLEEN: Within normal limits.  PANCREAS: Statuspost Whipple's.  ADRENALS: Within normal limits.  KIDNEYS/URETERS: Within normal limits.    BLADDER: Collapsed around Swanson catheter.  REPRODUCTIVE ORGANS: Uterus and adnexa are within normal limits.    BOWEL: Status post Whipple's procedure. There is new small bowel wall   mucosal edema of a few loops of small bowel within the right lower   quadrant, likely reactive. No bowel obstruction. Diffuse thickening of   the colon consistent with colitis.      PERITONEUM:   There is new diffuse peritoneal enhancement, compatible with peritonitis.    Right approach and left approach drainage catheters are again noted and   grossly unchanged in position.    Multiple peripherally enhancing abdominal collections:  -Pelvic collection with peritoneal enhancement is decreased in size,   however contains new foci of air concerning for infection (series 2,   images ).   -Anterior hepatic peripherally enhancing collection is decreased in size   from 1/30/2019.  -Small collection, posterior to the stomach is slightly increased from   1/30/2019.  -Left upper quadrant collection measuring greater than fluid attenuation   which is grossly unchanged in size and measures 7.4 x 4.9 cm (series 2,   image 57).    VESSELS:  Within normal limits.  RETROPERITONEUM: No lymphadenopathy.    ABDOMINAL WALL: Postsurgical changes involving the ventral abdominal   wall. Midline defect is again noted with overlying vac material.  BONES: Multilevel spinal degenerative changes.    IMPRESSION:   New diffuse peritoneal enhancement, compatible with peritonitis, more   extensive compared to 1/30/2019.    Multiple abdominal and pelvic fluid collections consistent with abscesses.      New small bowel wall may also edema and colonic wall thickening, likely   reactive.    Left upper quadrant collection measuring greater than fluid attenuation   is grossly unchanged from 1/30/2019.    Moderate left and small right pleural effusions with adjacent   atelectasis, slightly decreased from 1/30/2019.    Diffuse thickening of the colon consistent with colitis.    < end of copied text >      --------------------------------------------------------------------------------------    Critical Care Diagnoses:

## 2019-02-02 NOTE — PROGRESS NOTE ADULT - ASSESSMENT
ASSESSMENT:  54y Female PMHx HTN, T2DM, asthma, depression with recent diagnosis of invasive ampullary adenocarcinoma of the CBD admitted for cholangitis on 1/5, s/p ERCP 1/8, whipple 1/11, rapid response for GIB on floor 1/18 s/p MTP, RTOR s/p celiotomy, evacuation of hematoma, GDA bleed stopped and sutured, Abthera vac. Closure of abdomen with strattice mesh on 1/21, with inna drains and left lower JOURDAN drain in subcutaneous. Incisional bleed 1/23, bedside skin staples removed, JOURDAN drain removed, hemorrhage controlled, for continuation of ICU care.     Neurology:   -mental status has not worsened  -Tylenol & Oxy and Dilaudid PRN for pain  -Seroquel 50 for sleep (Was held last night as she did not need it to sleep)    Resp:  -not intubated, NC with supplemented O2 if needed. Sats remained stable, continue continuous pulse ox monitoring    CV:  -currently on 0.2 levophed  -SBP 120s, HR 90-100s  -Continue to monitor    GI:  - now NPO  - Hold off on TPN due to persistent episodes of being febrile  - abdominal collection, will repeat CT if drainable collection   - Bowel regimen  - loose stools noted with cdiff negative  - Increased secretions in drains bilious color      Renal:  - sinha in, strict I&O, monitor lytes, replete as needed   - IVF on hold, will resuscitate as needed  - -2L over last 24hrs    Heme:   -monitor H/H-  -ct lovenox vte ppx  -Vitamin K for     ID:   - Febrile to 101.3, given stat dose of meropenum and vancomycin =  - U/A neg, blood cx pending for last PAN cx done  - c-diff negative  - continue to trend WBC  - rpt CT abd/pelv    Access: Right AC 18G extended dwell peripheral IV, Left forearm 20G PIV, right femoral triple lumen, right raidal A line    Dispo: SICU     CC Diagnosis: Septic shock, respiratory abnormality, malnutrition

## 2019-02-02 NOTE — PROGRESS NOTE ADULT - ASSESSMENT
54y Female PMHx HTN, T2DM, asthma, depression with recent diagnosis of invasive ampullary adenocarcinoma of the CBD admitted for cholangitis on 1/5, s/p ERCP 1/8, whipple 1/11, rapid response for GIB on floor 1/18 s/p MTP, RTOR s/p celiotomy, evacuation of hematoma, GDA bleed stopped and sutured, Abthera vac. Closure of abdomen with strattice mesh on 1/21, with inna drains and left lower JOURDAN drain in subcutaneous. Incisional bleed 1/23, bedside skin staples removed, JOURDAN drain removed, hemorrhage controlled, for continuation of ICU care.     PLAN:  -pain control as needed  -lovenox for DVT ppx  -PT/oob  -NPO/IVF  -monitor for delirium  -monitor VS  -possible IR drainage of abdominal collection today  -c/w broad spectrum abx  -appreciate SICU care    D Team Surgery  [id number]

## 2019-02-02 NOTE — PROGRESS NOTE ADULT - SUBJECTIVE AND OBJECTIVE BOX
SICU Daily Progress Note  =====================================================  Interval/Overnight Events: Persistently febriile all day. Overnight, spiked fever, SBP dropped to 80-100s and became tachy 90s-110s. Albumin 250 and 250cc LR given but, Levophed restarted. JOURDAN X 2 started to drain copious amounts of bilious outpt. JOURDAN #1 700cc and JOURDAN #1 500cc. SBP now [id number]. Right radial arterial line and right femoral triple lumen central placed.        HPI:    54y Female PMHx HTN, T2DM, asthma, depression with recent diagnosis of invasive ampullary adenocarcinoma of the CBD admitted for cholangitis on 1/5, s/p ERCP 1/8, whipple 1/11, rapid response for GIB on floor 1/18 s/p MTP, RTOR s/p celiotomy, evacuation of hematoma, GDA bleed stopped and sutured, Abthera vac. Closure of abdomen with strattice mesh on 1/21, with inna drains and left lower JOURDAN drain in subcutaneous, s/p incisional bleed 1/23, bedside skin staples removed, JOURDAN drain removed, hemorrhage controlled, for continuation of ICU care for respiratory failure.        Allergies: No Known Allergies      MEDICATIONS:   --------------------------------------------------------------------------------------  Neurologic Medications  acetaminophen    Suspension .. 650 milliGRAM(s) Oral every 6 hours PRN Moderate Pain (4 - 6)  HYDROmorphone  Injectable 0.5 milliGRAM(s) IV Push every 4 hours PRN Pain unrelieved by PO  oxyCODONE    IR 5 milliGRAM(s) Oral every 4 hours PRN Moderate Pain (4 - 6)  oxyCODONE    IR 10 milliGRAM(s) Oral every 4 hours PRN Severe Pain (7 - 10)  QUEtiapine 50 milliGRAM(s) Oral at bedtime    Respiratory Medications    Cardiovascular Medications  norepinephrine Infusion 0.05 MICROgram(s)/kG/Min IV Continuous <Continuous>    Gastrointestinal Medications    Genitourinary Medications    Hematologic/Oncologic Medications  enoxaparin Injectable 40 milliGRAM(s) SubCutaneous daily    Antimicrobial/Immunologic Medications  meropenem  IVPB 1000 milliGRAM(s) IV Intermittent every 8 hours    Endocrine/Metabolic Medications  insulin lispro (HumaLOG) corrective regimen sliding scale   SubCutaneous three times a day before meals    Topical/Other Medications  artificial  tears Solution 1 Drop(s) Both EYES every 12 hours  chlorhexidine 4% Liquid 1 Application(s) Topical <User Schedule>    --------------------------------------------------------------------------------------  ICU Vital Signs Last 24 Hrs  T(C): 37.8 (02 Feb 2019 04:00), Max: 38.9 (01 Feb 2019 20:00)  T(F): 100 (02 Feb 2019 04:00), Max: 102 (01 Feb 2019 20:00)  HR: 104 (02 Feb 2019 04:00) (81 - 115)  BP: 122/42 (02 Feb 2019 04:00) (67/38 - 156/72)  BP(mean): 61 (02 Feb 2019 04:00) (29 - 91)  ABP: 129/52 (02 Feb 2019 04:00) (116/48 - 129/52)  ABP(mean): 70 (02 Feb 2019 04:00) (69 - 70)  RR: 24 (02 Feb 2019 04:00) (18 - 29)  SpO2: 95% (02 Feb 2019 04:00) (92% - 99%)    --------------------------------------------------------------------------------------  I&O's Detail    31 Jan 2019 07:01  -  01 Feb 2019 07:00  --------------------------------------------------------  IN:    Albumin 5%  - 250 mL: 250 mL    dextrose 5% + sodium chloride 0.45%.: 300 mL    IV PiggyBack: 500 mL    Oral Fluid: 470 mL  Total IN: 1520 mL    OUT:    Bulb: 85 mL    Bulb: 205 mL    Rectal Tube: 200 mL    Voided: 850 mL  Total OUT: 1340 mL    Total NET: 180 mL      01 Feb 2019 07:01  -  02 Feb 2019 05:16  --------------------------------------------------------  IN:    Albumin 5%  - 250 mL: 300 mL    IV PiggyBack: 450 mL    Lactated Ringers IV Bolus: 250 mL    norepinephrine Infusion: 20 mL    Oral Fluid: 480 mL    phenylephrine   Infusion: 103 mL  Total IN: 1603 mL    OUT:    Bulb: 450 mL    Bulb: 1105 mL    Indwelling Catheter - Urethral: 125 mL    Rectal Tube: 200 mL    Voided: 1350 mL  Total OUT: 3230 mL    Total NET: -1627 mL        --------------------------------------------------------------------------------------    EXAM  NEUROLOGY  RASS:   	GCS:    Exam: Normal, NAD, alert, oriented x3, no focal deficits. ***    HEENT  Exam: Normocephalic, atraumatic, EOMI.  ***    RESPIRATORY  Exam: Lungs clear to auscultation, Normal expansion/effort. ***  Mechanical Ventilation:     CARDIOVASCULAR  Exam: S1, S2.  Regular rate and rhythm.   ***    GI/NUTRITION  Exam: Abdomen soft, Non-tender, Non-distended.  ***  Wound:  ***  Current Diet:  NPO***    VASCULAR  Exam: Extremities warm, pink, well-perfused. ***    MUSCULOSKELETAL  Exam: All extremities moving spontaneously without limitations. ***    SKIN  Exam: Good skin turgor, no skin breakdown. ***    METABOLIC/FLUIDS/ELECTROLYTES      HEMATOLOGIC  [x] VTE Prophylaxis: enoxaparin Injectable 40 milliGRAM(s) SubCutaneous daily    Transfusions:	[] PRBC	[] Platelets		[] FFP	[] Cryoprecipitate    INFECTIOUS DISEASE  Antimicrobials/Immunologic Medications:  meropenem  IVPB 1000 milliGRAM(s) IV Intermittent every 8 hours    Day #      of     ***    Tubes/Lines/Drains  ***  [x] Peripheral IV  [] Central Venous Line     	[] R	[] L	[] IJ	[] Fem	[] SC	Date Placed:   [] Arterial Line		[] R	[] L	[] Fem	[] Rad	[] Ax	Date Placed:   [] PICC		[] Midline		[] Mediport  [] Urinary Catheter		Date Placed:   [x] Necessity of urinary, arterial, and venous catheters discussed    LABS  --------------------------------------------------------------------------------------  ((Insert SICU Labs here))***  --------------------------------------------------------------------------------------    OTHER LABORATORY:     IMAGING STUDIES:   CXR:     ASSESSMENT:  54y Female    ((insert from previous))***    PLAN:   ***  Neurologic:   Respiratory:   Cardiovascular:   Gastrointestinal/Nutrition:   Renal/Genitourinary:   Hematologic:   Infectious Disease:   Tubes/Lines/Drains:   Endocrine:   Disposition:     --------------------------------------------------------------------------------------    Critical Care Diagnoses: SICU Daily Progress Note  =====================================================  Interval/Overnight Events: Persistently febriile all day. Overnight, spiked fever 101.3, SBP dropped to 80-100s and became tachy 90s-110s,tachypnea to 28-40s. Albumin 250 and 250cc LR given but, BP did not respond. Levophed restarted. JOURDAN X 2 started to drain copious amounts of bilious outpt. JOURDAN #1 700cc and JOURDAN #1 500cc. Abd binder taken down abd soft, mild tenderness, midline W2D dressing removed, wound clean, no purulent drainage noted.  SBP now [id number]. Sinha placed for strict Is and Os. Right radial arterial line placed for BP monitoring and right femoral triple lumen central placed for access. Given stat dose of vancomycin and meropenum.      HPI:    54y Female PMHx HTN, T2DM, asthma, depression with recent diagnosis of invasive ampullary adenocarcinoma of the CBD admitted for cholangitis on 1/5, s/p ERCP 1/8, whipple 1/11, rapid response for GIB on floor 1/18 s/p MTP, RTOR s/p celiotomy, evacuation of hematoma, GDA bleed stopped and sutured, Abthera vac. Closure of abdomen with strattice mesh on 1/21, with inna drains and left lower JOURDAN drain in subcutaneous, s/p incisional bleed 1/23, bedside skin staples removed, JOURDAN drain removed, hemorrhage controlled, for continuation of ICU care for respiratory failure.        Allergies: No Known Allergies      MEDICATIONS:   --------------------------------------------------------------------------------------  Neurologic Medications  acetaminophen    Suspension .. 650 milliGRAM(s) Oral every 6 hours PRN Moderate Pain (4 - 6)  HYDROmorphone  Injectable 0.5 milliGRAM(s) IV Push every 4 hours PRN Pain unrelieved by PO  oxyCODONE    IR 5 milliGRAM(s) Oral every 4 hours PRN Moderate Pain (4 - 6)  oxyCODONE    IR 10 milliGRAM(s) Oral every 4 hours PRN Severe Pain (7 - 10)  QUEtiapine 50 milliGRAM(s) Oral at bedtime    Respiratory Medications    Cardiovascular Medications  norepinephrine Infusion 0.05 MICROgram(s)/kG/Min IV Continuous <Continuous>    Gastrointestinal Medications    Genitourinary Medications    Hematologic/Oncologic Medications  enoxaparin Injectable 40 milliGRAM(s) SubCutaneous daily    Antimicrobial/Immunologic Medications  meropenem  IVPB 1000 milliGRAM(s) IV Intermittent every 8 hours    Endocrine/Metabolic Medications  insulin lispro (HumaLOG) corrective regimen sliding scale   SubCutaneous three times a day before meals    Topical/Other Medications  artificial  tears Solution 1 Drop(s) Both EYES every 12 hours  chlorhexidine 4% Liquid 1 Application(s) Topical <User Schedule>    --------------------------------------------------------------------------------------  ICU Vital Signs Last 24 Hrs  T(C): 37.8 (02 Feb 2019 04:00), Max: 38.9 (01 Feb 2019 20:00)  T(F): 100 (02 Feb 2019 04:00), Max: 102 (01 Feb 2019 20:00)  HR: 104 (02 Feb 2019 04:00) (81 - 115)  BP: 122/42 (02 Feb 2019 04:00) (67/38 - 156/72)  BP(mean): 61 (02 Feb 2019 04:00) (29 - 91)  ABP: 129/52 (02 Feb 2019 04:00) (116/48 - 129/52)  ABP(mean): 70 (02 Feb 2019 04:00) (69 - 70)  RR: 24 (02 Feb 2019 04:00) (18 - 29)  SpO2: 95% (02 Feb 2019 04:00) (92% - 99%)    --------------------------------------------------------------------------------------  I&O's Detail    31 Jan 2019 07:01  -  01 Feb 2019 07:00  --------------------------------------------------------  IN:    Albumin 5%  - 250 mL: 250 mL    dextrose 5% + sodium chloride 0.45%.: 300 mL    IV PiggyBack: 500 mL    Oral Fluid: 470 mL  Total IN: 1520 mL    OUT:    Bulb: 85 mL    Bulb: 205 mL    Rectal Tube: 200 mL    Voided: 850 mL  Total OUT: 1340 mL    Total NET: 180 mL      01 Feb 2019 07:01  -  02 Feb 2019 05:16  --------------------------------------------------------  IN:    Albumin 5%  - 250 mL: 300 mL    IV PiggyBack: 450 mL    Lactated Ringers IV Bolus: 250 mL    norepinephrine Infusion: 20 mL    Oral Fluid: 480 mL    phenylephrine   Infusion: 103 mL  Total IN: 1603 mL    OUT:    Bulb: 450 mL    Bulb: 1105 mL    Indwelling Catheter - Urethral: 125 mL    Rectal Tube: 200 mL    Voided: 1350 mL  Total OUT: 3230 mL    Total NET: -1627 mL        --------------------------------------------------------------------------------------    EXAM  NEUROLOGY  Exam: Normal, NAD, alert, oriented x3, no focal deficits.     HEENT  Exam: Normocephalic, atraumatic, EOMI.      RESPIRATORY  Exam: Lungs clear to auscultation, Normal expansion/effort.     CARDIOVASCULAR  Exam: Tachycardic, regular rhythm    GI/NUTRITION  Exam: Abdomen soft, mild tenderness, non-distended, open abdomen visibled, clean border, no purulent drainage noted or surrounding erythema  Current Diet:  NPO    VASCULAR  Exam: Extremities warm, pink, well-perfused.     MUSCULOSKELETAL  Exam: All extremities moving spontaneously without limitations.     SKIN  Exam: Good skin turgor, no skin breakdown.     METABOLIC/FLUIDS/ELECTROLYTES      HEMATOLOGIC  [x] VTE Prophylaxis: enoxaparin Injectable 40 milliGRAM(s) SubCutaneous daily    INFECTIOUS DISEASE  Antimicrobials/Immunologic Medications:  meropenem  IVPB 1000 milliGRAM(s) IV Intermittent every 8 hours        Tubes/Lines/Drains    [x] Peripheral IV  [x] Central Venous Line     	[x] R	[] L	[] IJ	[x] Fem	[] SC	Date Placed: 2/2/19  x[] Arterial Line		[x] R	[] L	[] Fem	[x] Rad	[] Ax	Date Placed:   [x] Urinary Catheter		Date Placed: 2/2/19   [x] Necessity of urinary, arterial, and venous catheters discussed    LABS  --------------------------------------------------------------------------------------  CBC (02-02 @ 00:20)                              8.3<L>                         16.85<H>  )----------------(  465<H>     --    % Neutrophils, --    % Lymphocytes, ANC: --                                  25.9<L>  CBC (02-01 @ 12:55)                              8.0<L>                         18.13<H>  )----------------(  401<H>     --    % Neutrophils, --    % Lymphocytes, ANC: --                                  25.0<L>    BMP (02-02 @ 00:20)             133<L>  |  97<L>   |  24<H> 		Ca++ 0.87<L>  Ca 7.8<L>             ---------------------------------( 155<H>		Mg 1.9                4.8     |  18<L>   |  0.65  			Ph 2.8     BMP (02-01 @ 04:00)             133<L>  |  99      |  18    		Ca++ 1.06    Ca 7.9<L>             ---------------------------------( 141<H>		Mg 1.9                3.5     |  22      |  0.62  			Ph 3.2       LFTs (02-02 @ 00:20)      TPro 6.2 / Alb 2.3<L> / TBili 3.3<H> / DBili 1.1<H> / AST 88<H> / ALT 40<H> / AlkPhos 638<H>  LFTs (02-01 @ 04:00)      TPro 5.8<L> / Alb 2.3<L> / TBili 3.3<H> / DBili 2.2<H> / AST 81<H> / ALT 43<H> / AlkPhos 792<H>    Coags (02-02 @ 00:20)  aPTT 26.9<L> / INR 2.01<H> / PT 22.8<H>      ABG (02-02 @ 00:20)     7.56<H> / 24<L> / 120<H> / Insufficient quant / -1.3 / Insufficient quant%     Lactate: 2.1<H>   ABG (01-31 @ 19:20)     7.53<H> / 29<L> / 64<L> / 26 / 1.3 / 93.9<L>%     Lactate: 1.9       --------------------------------------------------------------------------------------      ASSESSMENT:  54y Female PMHx HTN, T2DM, asthma, depression with recent diagnosis of invasive ampullary adenocarcinoma of the CBD admitted for cholangitis on 1/5, s/p ERCP 1/8, whipple 1/11, rapid response for GIB on floor 1/18 s/p MTP, RTOR s/p celiotomy, evacuation of hematoma, GDA bleed stopped and sutured, Abthera vac. Closure of abdomen with strattice mesh on 1/21, with inna drains and left lower JOURDAN drain in subcutaneous. Incisional bleed 1/23, bedside skin staples removed, JOURDAN drain removed, hemorrhage controlled, for continuation of ICU care.     Neurology:   -mental status has not worsened  -Tylenol & Oxy and Dilaudid PRN for pain  -Seroquel 50 for sleep (Was held last night as she did not need it to sleep)    Resp:  -not intubated, NC with supplemented O2 if needed. Sats remained stable, continue continuous pulse ox monitoring    CV:  -currently on 0.2 levophed  -SBP 120s, HR 90-100s  -Continue to monitor    GI:  - now NPO  - Hold off on TPN due to persistent episodes of being febrile  - abdominal collection, will repeat CT on 2/3, possible IR 2/4 if drainable collection   - Bowel regimen  - loose stools noted today with cdiff negative      Renal:  - sinha in, strict I&O, monitor lytes, replete as needed   - IVF on hold, will resuscitate as needed    Heme:   -monitor H/H-  -ct lovenox vte ppx    ID:   - Febrile to 101.3, given stat dose of meropenum and vancomycin =  - U/A neg, blood cx pending for last PAN cx done  - c-diff negative  - continue to trend WBC  - rpt CT abd/pelv 2/3 for possible drainable collection     Access: Right AC 18G extended dwell peripheral IV, Left forearm 20G PIV, right femoral triple lumen, right raidal A line    Dispo: SICU     CC Diagnosis: Septic shock, respiratory abnormality, malnutrition SICU Daily Progress Note  =====================================================  Interval/Overnight Events: Persistently febriile all day. Overnight, spiked fever 101.3, SBP dropped to 80-100s and became tachy 90s-110s,tachypnea to 28-40s. Albumin 250 and 250cc LR given but, BP did not respond. Levophed restarted. JOURDAN X 2 started to drain copious amounts of bilious outpt. JOURDAN #1 700cc and JOURDAN #1 500cc. Abd binder taken down abd soft, mild tenderness, midline W2D dressing removed, wound clean, no purulent drainage noted.  SBP now [id number]. Swanson placed for strict Is and Os. Right radial arterial line placed for BP monitoring and right femoral triple lumen central placed for access. Given stat dose of vancomycin and meropenum.       HPI:    54y Female PMHx HTN, T2DM, asthma, depression with recent diagnosis of invasive ampullary adenocarcinoma of the CBD admitted for cholangitis on 1/5, s/p ERCP 1/8, whipple 1/11, rapid response for GIB on floor 1/18 s/p MTP, RTOR s/p celiotomy, evacuation of hematoma, GDA bleed stopped and sutured, Abthera vac. Closure of abdomen with strattice mesh on 1/21, with inna drains and left lower JOURDAN drain in subcutaneous, s/p incisional bleed 1/23, bedside skin staples removed, JOURDAN drain removed, hemorrhage controlled, for continuation of ICU care for respiratory failure.        Allergies: No Known Allergies      MEDICATIONS:   --------------------------------------------------------------------------------------  Neurologic Medications  acetaminophen    Suspension .. 650 milliGRAM(s) Oral every 6 hours PRN Moderate Pain (4 - 6)  HYDROmorphone  Injectable 0.5 milliGRAM(s) IV Push every 4 hours PRN Pain unrelieved by PO  oxyCODONE    IR 5 milliGRAM(s) Oral every 4 hours PRN Moderate Pain (4 - 6)  oxyCODONE    IR 10 milliGRAM(s) Oral every 4 hours PRN Severe Pain (7 - 10)  QUEtiapine 50 milliGRAM(s) Oral at bedtime    Respiratory Medications    Cardiovascular Medications  norepinephrine Infusion 0.05 MICROgram(s)/kG/Min IV Continuous <Continuous>    Gastrointestinal Medications    Genitourinary Medications    Hematologic/Oncologic Medications  enoxaparin Injectable 40 milliGRAM(s) SubCutaneous daily    Antimicrobial/Immunologic Medications  meropenem  IVPB 1000 milliGRAM(s) IV Intermittent every 8 hours    Endocrine/Metabolic Medications  insulin lispro (HumaLOG) corrective regimen sliding scale   SubCutaneous three times a day before meals    Topical/Other Medications  artificial  tears Solution 1 Drop(s) Both EYES every 12 hours  chlorhexidine 4% Liquid 1 Application(s) Topical <User Schedule>    --------------------------------------------------------------------------------------  ICU Vital Signs Last 24 Hrs  T(C): 37.8 (02 Feb 2019 04:00), Max: 38.9 (01 Feb 2019 20:00)  T(F): 100 (02 Feb 2019 04:00), Max: 102 (01 Feb 2019 20:00)  HR: 104 (02 Feb 2019 04:00) (81 - 115)  BP: 122/42 (02 Feb 2019 04:00) (67/38 - 156/72)  BP(mean): 61 (02 Feb 2019 04:00) (29 - 91)  ABP: 129/52 (02 Feb 2019 04:00) (116/48 - 129/52)  ABP(mean): 70 (02 Feb 2019 04:00) (69 - 70)  RR: 24 (02 Feb 2019 04:00) (18 - 29)  SpO2: 95% (02 Feb 2019 04:00) (92% - 99%)    --------------------------------------------------------------------------------------  I&O's Detail    31 Jan 2019 07:01  -  01 Feb 2019 07:00  --------------------------------------------------------  IN:    Albumin 5%  - 250 mL: 250 mL    dextrose 5% + sodium chloride 0.45%.: 300 mL    IV PiggyBack: 500 mL    Oral Fluid: 470 mL  Total IN: 1520 mL    OUT:    Bulb: 85 mL    Bulb: 205 mL    Rectal Tube: 200 mL    Voided: 850 mL  Total OUT: 1340 mL    Total NET: 180 mL      01 Feb 2019 07:01  -  02 Feb 2019 05:16  --------------------------------------------------------  IN:    Albumin 5%  - 250 mL: 300 mL    IV PiggyBack: 450 mL    Lactated Ringers IV Bolus: 250 mL    norepinephrine Infusion: 20 mL    Oral Fluid: 480 mL    phenylephrine   Infusion: 103 mL  Total IN: 1603 mL    OUT:    Bulb: 450 mL    Bulb: 1105 mL    Indwelling Catheter - Urethral: 125 mL    Rectal Tube: 200 mL    Voided: 1350 mL  Total OUT: 3230 mL    Total NET: -1627 mL        --------------------------------------------------------------------------------------    EXAM  NEUROLOGY  Exam: Normal, NAD, alert, oriented x3, no focal deficits.     HEENT  Exam: Normocephalic, atraumatic, EOMI.      RESPIRATORY  Exam: Lungs clear to auscultation, Normal expansion/effort.     CARDIOVASCULAR  Exam: Tachycardic, regular rhythm    GI/NUTRITION  Exam: Abdomen soft, mild tenderness, non-distended, open abdomen visibled, clean border, no purulent drainage noted or surrounding erythema  Current Diet:  NPO    VASCULAR  Exam: Extremities warm, pink, well-perfused.     MUSCULOSKELETAL  Exam: All extremities moving spontaneously without limitations.     SKIN  Exam: Good skin turgor, no skin breakdown.     METABOLIC/FLUIDS/ELECTROLYTES      HEMATOLOGIC  [x] VTE Prophylaxis: enoxaparin Injectable 40 milliGRAM(s) SubCutaneous daily    INFECTIOUS DISEASE  Antimicrobials/Immunologic Medications:  meropenem  IVPB 1000 milliGRAM(s) IV Intermittent every 8 hours        Tubes/Lines/Drains    [x] Peripheral IV  [x] Central Venous Line     	[x] R	[] L	[] IJ	[x] Fem	[] SC	Date Placed: 2/2/19  x[] Arterial Line		[x] R	[] L	[] Fem	[x] Rad	[] Ax	Date Placed:   [x] Urinary Catheter		Date Placed: 2/2/19   [x] Necessity of urinary, arterial, and venous catheters discussed    LABS  --------------------------------------------------------------------------------------  CBC (02-02 @ 00:20)                              8.3<L>                         16.85<H>  )----------------(  465<H>     --    % Neutrophils, --    % Lymphocytes, ANC: --                                  25.9<L>  CBC (02-01 @ 12:55)                              8.0<L>                         18.13<H>  )----------------(  401<H>     --    % Neutrophils, --    % Lymphocytes, ANC: --                                  25.0<L>    BMP (02-02 @ 00:20)             133<L>  |  97<L>   |  24<H> 		Ca++ 0.87<L>  Ca 7.8<L>             ---------------------------------( 155<H>		Mg 1.9                4.8     |  18<L>   |  0.65  			Ph 2.8     BMP (02-01 @ 04:00)             133<L>  |  99      |  18    		Ca++ 1.06    Ca 7.9<L>             ---------------------------------( 141<H>		Mg 1.9                3.5     |  22      |  0.62  			Ph 3.2       LFTs (02-02 @ 00:20)      TPro 6.2 / Alb 2.3<L> / TBili 3.3<H> / DBili 1.1<H> / AST 88<H> / ALT 40<H> / AlkPhos 638<H>  LFTs (02-01 @ 04:00)      TPro 5.8<L> / Alb 2.3<L> / TBili 3.3<H> / DBili 2.2<H> / AST 81<H> / ALT 43<H> / AlkPhos 792<H>    Coags (02-02 @ 00:20)  aPTT 26.9<L> / INR 2.01<H> / PT 22.8<H>      ABG (02-02 @ 00:20)     7.56<H> / 24<L> / 120<H> / Insufficient quant / -1.3 / Insufficient quant%     Lactate: 2.1<H>   ABG (01-31 @ 19:20)     7.53<H> / 29<L> / 64<L> / 26 / 1.3 / 93.9<L>%     Lactate: 1.9       --------------------------------------------------------------------------------------

## 2019-02-02 NOTE — PROGRESS NOTE ADULT - SUBJECTIVE AND OBJECTIVE BOX
Surgery Progress Note    S: Patient seen and examined with Dr. Hearn. Patient has remained persistently febrile and is now requiring pressor support. Cultures sent and broad spectrum abx started. Sinha placed and arterial line placed for hemodynamic monitoring. This Am, pain is well controlled. patient denies n/v.    O:  Vital Signs Last 24 Hrs  T(C): 38 (02 Feb 2019 08:00), Max: 38.9 (01 Feb 2019 20:00)  T(F): 100.4 (02 Feb 2019 08:00), Max: 102 (01 Feb 2019 20:00)  HR: 103 (02 Feb 2019 09:00) (89 - 115)  BP: 97/45 (02 Feb 2019 08:00) (67/38 - 156/72)  BP(mean): 58 (02 Feb 2019 08:00) (29 - 91)  RR: 21 (02 Feb 2019 09:00) (18 - 29)  SpO2: 98% (02 Feb 2019 09:00) (92% - 99%)    I&O's Detail    01 Feb 2019 07:01  -  02 Feb 2019 07:00  --------------------------------------------------------  IN:    Albumin 5%  - 250 mL: 300 mL    IV PiggyBack: 500 mL    Lactated Ringers IV Bolus: 250 mL    norepinephrine Infusion: 55 mL    Oral Fluid: 480 mL    phenylephrine   Infusion: 103 mL  Total IN: 1688 mL    OUT:    Bulb: 1115 mL    Bulb: 560 mL    Indwelling Catheter - Urethral: 255 mL    Rectal Tube: 400 mL    Voided: 1350 mL  Total OUT: 3680 mL    Total NET: -1992 mL      02 Feb 2019 07:01  -  02 Feb 2019 10:09  --------------------------------------------------------  IN:    norepinephrine Infusion: 16 mL  Total IN: 16 mL    OUT:    Indwelling Catheter - Urethral: 50 mL  Total OUT: 50 mL    Total NET: -34 mL          MEDICATIONS  (STANDING):  artificial  tears Solution 1 Drop(s) Both EYES every 12 hours  chlorhexidine 4% Liquid 1 Application(s) Topical <User Schedule>  dextrose 5% + sodium chloride 0.45%. 1000 milliLiter(s) (50 mL/Hr) IV Continuous <Continuous>  enoxaparin Injectable 40 milliGRAM(s) SubCutaneous daily  insulin lispro (HumaLOG) corrective regimen sliding scale   SubCutaneous every 6 hours  meropenem  IVPB 1000 milliGRAM(s) IV Intermittent every 8 hours  norepinephrine Infusion 0.05 MICROgram(s)/kG/Min (2.691 mL/Hr) IV Continuous <Continuous>  QUEtiapine 50 milliGRAM(s) Oral at bedtime    MEDICATIONS  (PRN):  acetaminophen    Suspension .. 650 milliGRAM(s) Oral every 6 hours PRN Moderate Pain (4 - 6)  HYDROmorphone  Injectable 0.5 milliGRAM(s) IV Push every 4 hours PRN Pain unrelieved by PO  oxyCODONE    IR 5 milliGRAM(s) Oral every 4 hours PRN Moderate Pain (4 - 6)  oxyCODONE    IR 10 milliGRAM(s) Oral every 4 hours PRN Severe Pain (7 - 10)                            8.3    16.85 )-----------( 465      ( 02 Feb 2019 00:20 )             25.9       02-02    133<L>  |  97<L>  |  24<H>  ----------------------------<  155<H>  4.8   |  18<L>  |  0.65    Ca    7.8<L>      02 Feb 2019 00:20  Phos  2.8     02-02  Mg     1.9     02-02    TPro  6.2  /  Alb  2.3<L>  /  TBili  3.3<H>  /  DBili  1.1<H>  /  AST  88<H>  /  ALT  40<H>  /  AlkPhos  638<H>  02-02      Physical Exam:  Gen: Laying in bed, NAD  Resp: Unlabored breathing  Abd: soft, NTND, midline incision re-packed, RUQ and LLQ JOURDAN bilious, no rebound or guarding  Ext: WWP  Skin: No rashes  : sinha in place draining yellow/clear urine

## 2019-02-02 NOTE — PROGRESS NOTE ADULT - ATTENDING COMMENTS
54y Female PMHx HTN, T2DM, asthma, depression with recent diagnosis of invasive ampullary adenocarcinoma of the CBD admitted for cholangitis on 1/5, s/p ERCP 1/8, whipple 1/11, rapid response for GIB on floor 1/18 s/p MTP, RTOR s/p celiotomy, evacuation of hematoma, GDA bleed stopped and sutured, Abthera vac. Closure of abdomen with strattice mesh on 1/21, with inna drains and left lower JOURDAN drain in subcutaneous. Incisional bleed 1/23, bedside skin staples removed, JOURDAN drain removed, hemorrhage controlled, for continuation of ICU care.     Neurology:   -mental status has not worsened  -Tylenol & Oxy and Dilaudid PRN for pain  -Seroquel 50 for sleep (Was held last night as she did not need it to sleep)    Resp:  -not intubated, NC with supplemented O2 if needed. Sats remained stable, continue continuous pulse ox monitoring    CV:  -currently on 0.2 levophed  -SBP 120s, HR 90-100s  -Continue to monitor    GI:  - now NPO  - Hold off on TPN due to persistent episodes of being febrile  - abdominal collection, will repeat CT if drainable collection   - Bowel regimen  - loose stools noted with cdiff negative  - Increased secretions in drains bilious color      Renal:  - sinha in, strict I&O, monitor lytes, replete as needed   - IVF on hold, will resuscitate as needed  - -2L over last 24hrs    Heme:   -monitor H/H-  -ct lovenox vte ppx  -Vitamin K for     ID:   - Febrile to 101.3, given stat dose of meropenum and vancomycin =  - U/A neg, blood cx pending for last PAN cx done  - c-diff negative  - continue to trend WBC  - rpt CT abd/pelv    Access: Right AC 18G extended dwell peripheral IV, Left forearm 20G PIV, right femoral triple lumen, right raidal A line    Dispo: SICU     CC Diagnosis: Septic shock, respiratory abnormality, malnutrition  The patient is a critical care patient with life threatening hemodynamic and metabolic instability in SICU.  I have personally interviewed when possible and examined the patient, reviewed data and laboratory tests/x-rays and all pertinent electronic images.  I was physically present for the key portions of the evaluation and management (E/M) service provided.   The SICU team has a constant risk benefit analyzes discussion with the primary team, all consultants, House Staff and PA's on all decisions.  These diagnoses are unrelated to the surgical procedure noted above.  I meet with family if needed to get further history, discuss the case and make care decisions for this patient who might not be able to participate.  Time involved in performance of separately billable procedures was not counted toward my critical care time. There is no overlap.  I spent 55-75 minutes of critical care time for the diagnoses, assessment, plan and interventions.

## 2019-02-02 NOTE — PROGRESS NOTE ADULT - ASSESSMENT
ASSESSMENT:  54y Female PMHx HTN, T2DM, asthma, depression with recent diagnosis of invasive ampullary adenocarcinoma of the CBD admitted for cholangitis on 1/5, s/p ERCP 1/8, whipple 1/11, rapid response for GIB on floor 1/18 s/p MTP, RTOR s/p celiotomy, evacuation of hematoma, GDA bleed stopped and sutured, Abthera vac. Closure of abdomen with strattice mesh on 1/21, with inna drains and left lower JOURDAN drain in subcutaneous. Incisional bleed 1/23, bedside skin staples removed, JOURDAN drain removed, hemorrhage controlled, for continuation of ICU care.     Neurology:   -mental status has not worsened  -Tylenol & Oxy and Dilaudid PRN for pain  -Seroquel 50 for sleep (Was held last night as she did not need it to sleep)    Resp:  -not intubated, NC with supplemented O2 if needed. Sats remained stable, continue continuous pulse ox monitoring    CV:  -currently on 0.2 levophed  -SBP 120s, HR 90-100s  -Continue to monitor    GI:  - now NPO  - Hold off on TPN for persistent fever  - Repeat CT scan as above, will follow up surgery for plan   - Bowel regimen  - loose stools noted with cdiff negative  - Increased secretions in drains green/brown in color      Renal:  - sinha in, strict I&O, monitor lytes, replete as needed   - IVF on hold, will resuscitate as needed  - -2L over last 24hrs    Heme:   -monitor H/H-  -ct lovenox vte ppx  -Vitamin K for     ID:   - Febrile to 101.3, given stat dose of meropenum and vancomycin =  - U/A neg, blood cx pending for last PAN cx done  - c-diff negative  - continue to trend WBC  - rpt CT abd/pelv    Access: Right AC 18G extended dwell peripheral IV, Left forearm 20G PIV, right femoral triple lumen, right radial A line    Dispo: SICU     CC Diagnosis: Septic shock, respiratory abnormality, malnutrition, peritonitis, abdominal abscess

## 2019-02-02 NOTE — PROGRESS NOTE ADULT - ATTENDING COMMENTS
54y Female PMHx HTN, T2DM, asthma, depression with recent diagnosis of invasive ampullary adenocarcinoma of the CBD admitted for cholangitis on 1/5, s/p ERCP 1/8, whipple 1/11, rapid response for GIB on floor 1/18 s/p MTP, RTOR s/p celiotomy, evacuation of hematoma, GDA bleed stopped and sutured, Abthera vac. Closure of abdomen with strattice mesh on 1/21, with inna drains and left lower JOURDAN drain in subcutaneous. Incisional bleed 1/23, bedside skin staples removed, JOURDAN drain removed, hemorrhage controlled, for continuation of ICU care.     Neurology:   -mental status has not worsened  -Tylenol & Oxy and Dilaudid PRN for pain  -Seroquel 50 for sleep (Was held last night as she did not need it to sleep)    Resp:  -not intubated, NC with supplemented O2 if needed. Sats remained stable, continue continuous pulse ox monitoring    CV:  -currently on 0.2 levophed  -SBP 120s, HR 90-100s  -Continue to monitor    GI:  - now NPO  - Hold off on TPN for persistent fever  - Repeat CT scan as above, will follow up surgery for plan   - Bowel regimen  - loose stools noted with cdiff negative  - Increased secretions in drains green/brown in color      Renal:  - sinha in, strict I&O, monitor lytes, replete as needed   - IVF on hold, will resuscitate as needed  - -2L over last 24hrs    Heme:   -monitor H/H-  -ct lovenox vte ppx  -Vitamin K for     ID:   - Febrile to 101.3, given stat dose of meropenum and vancomycin =  - U/A neg, blood cx pending for last PAN cx done  - c-diff negative  - continue to trend WBC  - rpt CT abd/pelv    Access: Right AC 18G extended dwell peripheral IV, Left forearm 20G PIV, right femoral triple lumen, right radial A line    Dispo: SICU     CC Diagnosis: Septic shock, respiratory abnormality, malnutrition, peritonitis, abdominal abscess    The patient is a critical care patient with life threatening hemodynamic and metabolic instability in SICU.  I have personally interviewed when possible and examined the patient, reviewed data and laboratory tests/x-rays and all pertinent electronic images.  I was physically present for the key portions of the evaluation and management (E/M) service provided.   The SICU team has a constant risk benefit analyzes discussion with the primary team, all consultants, House Staff and PA's on all decisions.  These diagnoses are unrelated to the surgical procedure noted above.  I meet with family if needed to get further history, discuss the case and make care decisions for this patient who might not be able to participate.  Time involved in performance of separately billable procedures was not counted toward my critical care time. There is no overlap.  I spent 55-75 minutes of critical care time for the diagnoses, assessment, plan and interventions.

## 2019-02-02 NOTE — PROCEDURE NOTE - NSPOSTCAREGUIDE_GEN_A_CORE
Care for catheter as per unit/ICU protocols
Verbal/written post procedure instructions were given to patient/caregiver
Verbal/written post procedure instructions were given to patient/caregiver

## 2019-02-03 LAB
ANION GAP SERPL CALC-SCNC: 19 MMO/L — HIGH (ref 7–14)
APTT BLD: 31.4 SEC — SIGNIFICANT CHANGE UP (ref 27.5–36.3)
BASE EXCESS BLDA CALC-SCNC: -0.6 MMOL/L — SIGNIFICANT CHANGE UP
BASE EXCESS BLDA CALC-SCNC: -3.4 MMOL/L — SIGNIFICANT CHANGE UP
BASE EXCESS BLDA CALC-SCNC: -3.7 MMOL/L — SIGNIFICANT CHANGE UP
BASE EXCESS BLDA CALC-SCNC: -3.8 MMOL/L — SIGNIFICANT CHANGE UP
BASE EXCESS BLDA CALC-SCNC: -5.5 MMOL/L — SIGNIFICANT CHANGE UP
BASE EXCESS BLDV CALC-SCNC: -0.8 MMOL/L — SIGNIFICANT CHANGE UP
BLD GP AB SCN SERPL QL: NEGATIVE — SIGNIFICANT CHANGE UP
BLOOD GAS VENOUS - CREATININE: 0.82 MG/DL — SIGNIFICANT CHANGE UP (ref 0.5–1.3)
BUN SERPL-MCNC: 32 MG/DL — HIGH (ref 7–23)
CA-I BLD-SCNC: 1.11 MMOL/L — SIGNIFICANT CHANGE UP (ref 1.03–1.23)
CA-I BLDA-SCNC: 1.07 MMOL/L — LOW (ref 1.15–1.29)
CA-I BLDA-SCNC: 1.13 MMOL/L — LOW (ref 1.15–1.29)
CALCIUM SERPL-MCNC: 8 MG/DL — LOW (ref 8.4–10.5)
CHLORIDE BLDV-SCNC: 106 MMOL/L — SIGNIFICANT CHANGE UP (ref 96–108)
CHLORIDE SERPL-SCNC: 101 MMOL/L — SIGNIFICANT CHANGE UP (ref 98–107)
CO2 SERPL-SCNC: 18 MMOL/L — LOW (ref 22–31)
CREAT SERPL-MCNC: 0.91 MG/DL — SIGNIFICANT CHANGE UP (ref 0.5–1.3)
GAS PNL BLDV: 131 MMOL/L — LOW (ref 136–146)
GLUCOSE BLDA-MCNC: 134 MG/DL — HIGH (ref 70–99)
GLUCOSE BLDA-MCNC: 147 MG/DL — HIGH (ref 70–99)
GLUCOSE BLDA-MCNC: 195 MG/DL — HIGH (ref 70–99)
GLUCOSE BLDV-MCNC: 134 — HIGH (ref 70–99)
GLUCOSE SERPL-MCNC: 139 MG/DL — HIGH (ref 70–99)
HCO3 BLDA-SCNC: 20 MMOL/L — LOW (ref 22–26)
HCO3 BLDA-SCNC: 21 MMOL/L — LOW (ref 22–26)
HCO3 BLDA-SCNC: 22 MMOL/L — SIGNIFICANT CHANGE UP (ref 22–26)
HCO3 BLDA-SCNC: 22 MMOL/L — SIGNIFICANT CHANGE UP (ref 22–26)
HCO3 BLDA-SCNC: 24 MMOL/L — SIGNIFICANT CHANGE UP (ref 22–26)
HCO3 BLDV-SCNC: 24 MMOL/L — SIGNIFICANT CHANGE UP (ref 20–27)
HCT VFR BLD CALC: 21.5 % — LOW (ref 34.5–45)
HCT VFR BLD CALC: 30.7 % — LOW (ref 34.5–45)
HCT VFR BLDA CALC: 22.5 % — LOW (ref 34.5–46.5)
HCT VFR BLDA CALC: 30.4 % — LOW (ref 34.5–46.5)
HCT VFR BLDA CALC: 30.6 % — LOW (ref 34.5–46.5)
HCT VFR BLDV CALC: 29.3 % — LOW (ref 34.5–45)
HGB BLD-MCNC: 10.4 G/DL — LOW (ref 11.5–15.5)
HGB BLD-MCNC: 6.9 G/DL — CRITICAL LOW (ref 11.5–15.5)
HGB BLDA-MCNC: 7.2 G/DL — LOW (ref 11.5–15.5)
HGB BLDA-MCNC: 9.8 G/DL — LOW (ref 11.5–15.5)
HGB BLDA-MCNC: 9.9 G/DL — LOW (ref 11.5–15.5)
HGB BLDV-MCNC: 9.5 G/DL — LOW (ref 11.5–15.5)
INR BLD: 2.59 — HIGH (ref 0.88–1.17)
LACTATE BLDA-SCNC: 3.4 MMOL/L — HIGH (ref 0.5–2)
LACTATE BLDA-SCNC: 3.5 MMOL/L — HIGH (ref 0.5–2)
LACTATE BLDV-MCNC: 1.7 MMOL/L — SIGNIFICANT CHANGE UP (ref 0.5–2)
MAGNESIUM SERPL-MCNC: 2.1 MG/DL — SIGNIFICANT CHANGE UP (ref 1.6–2.6)
MCHC RBC-ENTMCNC: 27.4 PG — SIGNIFICANT CHANGE UP (ref 27–34)
MCHC RBC-ENTMCNC: 28.5 PG — SIGNIFICANT CHANGE UP (ref 27–34)
MCHC RBC-ENTMCNC: 32.1 % — SIGNIFICANT CHANGE UP (ref 32–36)
MCHC RBC-ENTMCNC: 33.9 % — SIGNIFICANT CHANGE UP (ref 32–36)
MCV RBC AUTO: 84.1 FL — SIGNIFICANT CHANGE UP (ref 80–100)
MCV RBC AUTO: 85.3 FL — SIGNIFICANT CHANGE UP (ref 80–100)
NRBC # FLD: 0.06 K/UL — LOW (ref 25–125)
NRBC # FLD: 0.09 K/UL — LOW (ref 25–125)
PCO2 BLDA: 27 MMHG — LOW (ref 32–48)
PCO2 BLDA: 28 MMHG — LOW (ref 32–48)
PCO2 BLDA: 28 MMHG — LOW (ref 32–48)
PCO2 BLDA: 29 MMHG — LOW (ref 32–48)
PCO2 BLDA: 35 MMHG — SIGNIFICANT CHANGE UP (ref 32–48)
PCO2 BLDV: 33 MMHG — LOW (ref 41–51)
PH BLDA: 7.44 PH — SIGNIFICANT CHANGE UP (ref 7.35–7.45)
PH BLDA: 7.44 PH — SIGNIFICANT CHANGE UP (ref 7.35–7.45)
PH BLDA: 7.45 PH — SIGNIFICANT CHANGE UP (ref 7.35–7.45)
PH BLDA: 7.46 PH — HIGH (ref 7.35–7.45)
PH BLDA: 7.46 PH — HIGH (ref 7.35–7.45)
PH BLDV: 7.45 PH — HIGH (ref 7.32–7.43)
PHOSPHATE SERPL-MCNC: 3.7 MG/DL — SIGNIFICANT CHANGE UP (ref 2.5–4.5)
PLATELET # BLD AUTO: 392 K/UL — SIGNIFICANT CHANGE UP (ref 150–400)
PLATELET # BLD AUTO: 403 K/UL — HIGH (ref 150–400)
PMV BLD: 10.2 FL — SIGNIFICANT CHANGE UP (ref 7–13)
PMV BLD: 10.4 FL — SIGNIFICANT CHANGE UP (ref 7–13)
PO2 BLDA: 119 MMHG — HIGH (ref 83–108)
PO2 BLDA: 62 MMHG — LOW (ref 83–108)
PO2 BLDA: 79 MMHG — LOW (ref 83–108)
PO2 BLDA: 80 MMHG — LOW (ref 83–108)
PO2 BLDA: 91 MMHG — SIGNIFICANT CHANGE UP (ref 83–108)
PO2 BLDV: 143 MMHG — HIGH (ref 35–40)
POTASSIUM BLDA-SCNC: 3.2 MMOL/L — LOW (ref 3.4–4.5)
POTASSIUM BLDA-SCNC: 3.8 MMOL/L — SIGNIFICANT CHANGE UP (ref 3.4–4.5)
POTASSIUM BLDA-SCNC: 4.2 MMOL/L — SIGNIFICANT CHANGE UP (ref 3.4–4.5)
POTASSIUM BLDV-SCNC: 3.2 MMOL/L — LOW (ref 3.4–4.5)
POTASSIUM SERPL-MCNC: 4.2 MMOL/L — SIGNIFICANT CHANGE UP (ref 3.5–5.3)
POTASSIUM SERPL-SCNC: 4.2 MMOL/L — SIGNIFICANT CHANGE UP (ref 3.5–5.3)
PROTHROM AB SERPL-ACNC: 29.6 SEC — HIGH (ref 9.8–13.1)
RBC # BLD: 2.52 M/UL — LOW (ref 3.8–5.2)
RBC # BLD: 3.65 M/UL — LOW (ref 3.8–5.2)
RBC # FLD: 15.9 % — HIGH (ref 10.3–14.5)
RBC # FLD: 18.2 % — HIGH (ref 10.3–14.5)
RH IG SCN BLD-IMP: POSITIVE — SIGNIFICANT CHANGE UP
SAO2 % BLDA: 91.7 % — LOW (ref 95–99)
SAO2 % BLDA: 95.9 % — SIGNIFICANT CHANGE UP (ref 95–99)
SAO2 % BLDA: 96.5 % — SIGNIFICANT CHANGE UP (ref 95–99)
SAO2 % BLDA: 97.6 % — SIGNIFICANT CHANGE UP (ref 95–99)
SAO2 % BLDA: 98.7 % — SIGNIFICANT CHANGE UP (ref 95–99)
SAO2 % BLDV: 99.2 % — HIGH (ref 60–85)
SODIUM BLDA-SCNC: 131 MMOL/L — LOW (ref 136–146)
SODIUM BLDA-SCNC: 132 MMOL/L — LOW (ref 136–146)
SODIUM BLDA-SCNC: 132 MMOL/L — LOW (ref 136–146)
SODIUM SERPL-SCNC: 138 MMOL/L — SIGNIFICANT CHANGE UP (ref 135–145)
WBC # BLD: 26.23 K/UL — HIGH (ref 3.8–10.5)
WBC # BLD: 28.85 K/UL — HIGH (ref 3.8–10.5)
WBC # FLD AUTO: 26.23 K/UL — HIGH (ref 3.8–10.5)
WBC # FLD AUTO: 28.85 K/UL — HIGH (ref 3.8–10.5)

## 2019-02-03 PROCEDURE — 36620 INSERTION CATHETER ARTERY: CPT

## 2019-02-03 PROCEDURE — 76937 US GUIDE VASCULAR ACCESS: CPT | Mod: 26

## 2019-02-03 PROCEDURE — 74018 RADEX ABDOMEN 1 VIEW: CPT | Mod: 26

## 2019-02-03 PROCEDURE — 99291 CRITICAL CARE FIRST HOUR: CPT | Mod: 25

## 2019-02-03 PROCEDURE — 71045 X-RAY EXAM CHEST 1 VIEW: CPT | Mod: 26,77,76

## 2019-02-03 PROCEDURE — 71045 X-RAY EXAM CHEST 1 VIEW: CPT | Mod: 26

## 2019-02-03 RX ORDER — VANCOMYCIN HCL 1 G
750 VIAL (EA) INTRAVENOUS ONCE
Qty: 0 | Refills: 0 | Status: COMPLETED | OUTPATIENT
Start: 2019-02-03 | End: 2019-02-03

## 2019-02-03 RX ORDER — FLUCONAZOLE 150 MG/1
200 TABLET ORAL EVERY 24 HOURS
Qty: 0 | Refills: 0 | Status: DISCONTINUED | OUTPATIENT
Start: 2019-02-04 | End: 2019-02-05

## 2019-02-03 RX ORDER — NOREPINEPHRINE BITARTRATE/D5W 8 MG/250ML
0.02 PLASTIC BAG, INJECTION (ML) INTRAVENOUS
Qty: 16 | Refills: 0 | Status: DISCONTINUED | OUTPATIENT
Start: 2019-02-03 | End: 2019-02-06

## 2019-02-03 RX ORDER — FLUCONAZOLE 150 MG/1
200 TABLET ORAL ONCE
Qty: 0 | Refills: 0 | Status: COMPLETED | OUTPATIENT
Start: 2019-02-03 | End: 2019-02-03

## 2019-02-03 RX ORDER — FUROSEMIDE 40 MG
20 TABLET ORAL ONCE
Qty: 0 | Refills: 0 | Status: COMPLETED | OUTPATIENT
Start: 2019-02-03 | End: 2019-02-03

## 2019-02-03 RX ORDER — PHYTONADIONE (VIT K1) 5 MG
5 TABLET ORAL ONCE
Qty: 0 | Refills: 0 | Status: COMPLETED | OUTPATIENT
Start: 2019-02-03 | End: 2019-02-03

## 2019-02-03 RX ORDER — FLUCONAZOLE 150 MG/1
TABLET ORAL
Qty: 0 | Refills: 0 | Status: DISCONTINUED | OUTPATIENT
Start: 2019-02-03 | End: 2019-02-05

## 2019-02-03 RX ORDER — ONDANSETRON 8 MG/1
4 TABLET, FILM COATED ORAL ONCE
Qty: 0 | Refills: 0 | Status: COMPLETED | OUTPATIENT
Start: 2019-02-03 | End: 2019-02-03

## 2019-02-03 RX ORDER — VANCOMYCIN HCL 1 G
VIAL (EA) INTRAVENOUS
Qty: 0 | Refills: 0 | Status: DISCONTINUED | OUTPATIENT
Start: 2019-02-03 | End: 2019-02-15

## 2019-02-03 RX ORDER — VANCOMYCIN HCL 1 G
750 VIAL (EA) INTRAVENOUS EVERY 12 HOURS
Qty: 0 | Refills: 0 | Status: DISCONTINUED | OUTPATIENT
Start: 2019-02-03 | End: 2019-02-15

## 2019-02-03 RX ADMIN — QUETIAPINE FUMARATE 50 MILLIGRAM(S): 200 TABLET, FILM COATED ORAL at 21:43

## 2019-02-03 RX ADMIN — Medication 2: at 05:59

## 2019-02-03 RX ADMIN — Medication 650 MILLIGRAM(S): at 21:50

## 2019-02-03 RX ADMIN — HYDROMORPHONE HYDROCHLORIDE 0.5 MILLIGRAM(S): 2 INJECTION INTRAMUSCULAR; INTRAVENOUS; SUBCUTANEOUS at 11:50

## 2019-02-03 RX ADMIN — ENOXAPARIN SODIUM 40 MILLIGRAM(S): 100 INJECTION SUBCUTANEOUS at 11:08

## 2019-02-03 RX ADMIN — Medication 1 DROP(S): at 17:45

## 2019-02-03 RX ADMIN — ONDANSETRON 4 MILLIGRAM(S): 8 TABLET, FILM COATED ORAL at 20:06

## 2019-02-03 RX ADMIN — FLUCONAZOLE 100 MILLIGRAM(S): 150 TABLET ORAL at 11:08

## 2019-02-03 RX ADMIN — Medication 101 MILLIGRAM(S): at 05:20

## 2019-02-03 RX ADMIN — Medication 250 MILLIGRAM(S): at 18:11

## 2019-02-03 RX ADMIN — Medication 2.69 MICROGRAM(S)/KG/MIN: at 08:00

## 2019-02-03 RX ADMIN — Medication 1 DROP(S): at 05:59

## 2019-02-03 RX ADMIN — SODIUM CHLORIDE 50 MILLILITER(S): 9 INJECTION, SOLUTION INTRAVENOUS at 20:06

## 2019-02-03 RX ADMIN — Medication 101 MILLIGRAM(S): at 17:45

## 2019-02-03 RX ADMIN — MEROPENEM 100 MILLIGRAM(S): 1 INJECTION INTRAVENOUS at 21:43

## 2019-02-03 RX ADMIN — HYDROMORPHONE HYDROCHLORIDE 0.5 MILLIGRAM(S): 2 INJECTION INTRAMUSCULAR; INTRAVENOUS; SUBCUTANEOUS at 11:34

## 2019-02-03 RX ADMIN — MEROPENEM 100 MILLIGRAM(S): 1 INJECTION INTRAVENOUS at 14:13

## 2019-02-03 RX ADMIN — Medication 1.08 MICROGRAM(S)/KG/MIN: at 23:00

## 2019-02-03 RX ADMIN — Medication 650 MILLIGRAM(S): at 22:20

## 2019-02-03 RX ADMIN — MEROPENEM 100 MILLIGRAM(S): 1 INJECTION INTRAVENOUS at 05:59

## 2019-02-03 RX ADMIN — VASOPRESSIN 2.4 UNIT(S)/MIN: 20 INJECTION INTRAVENOUS at 08:00

## 2019-02-03 RX ADMIN — Medication 2: at 11:15

## 2019-02-03 RX ADMIN — Medication 250 MILLIGRAM(S): at 05:20

## 2019-02-03 RX ADMIN — Medication 20 MILLIGRAM(S): at 18:39

## 2019-02-03 NOTE — PROCEDURE NOTE - NSPROCDETAILS_GEN_ALL_CORE
location identified, draped/prepped, sterile technique used, needle inserted/introduced/hemostasis with direct pressure, dressing applied/connected to a pressurized flush line/ultrasound guidance/positive blood return obtained via catheter/sutured in place
sterile dressing applied/ultrasound guidance/lumen(s) aspirated and flushed/guidewire recovered/sterile technique, catheter placed
sterile technique, catheter placed/guidewire recovered/lumen(s) aspirated and flushed/sterile dressing applied/ultrasound guidance
sterile technique, catheter placed/ultrasound guidance/sterile dressing applied/lumen(s) aspirated and flushed/guidewire recovered
location identified, draped/prepped, sterile technique used, needle inserted/introduced/connected to a pressurized flush line/all materials/supplies accounted for at end of procedure/sutured in place/positive blood return obtained via catheter/hemostasis with direct pressure, dressing applied/Seldinger technique/ultrasound guidance
sutured in place/hemostasis with direct pressure, dressing applied/connected to a pressurized flush line/positive blood return obtained via catheter/location identified, draped/prepped, sterile technique used, needle inserted/introduced/Seldinger technique/all materials/supplies accounted for at end of procedure/ultrasound guidance

## 2019-02-03 NOTE — PROGRESS NOTE ADULT - ATTENDING COMMENTS
54y Female PMHx HTN, T2DM, asthma, depression with recent diagnosis of invasive ampullary adenocarcinoma of the CBD admitted for cholangitis on 1/5, s/p ERCP 1/8, whipple 1/11, rapid response for GIB on floor 1/18 s/p MTP, RTOR s/p celiotomy, evacuation of hematoma, GDA bleed stopped and sutured, Abthera vac. Closure of abdomen with strattice mesh on 1/21, with inna drains and left lower JOURDAN drain in subcutaneous. Incisional bleed 1/23, bedside skin staples removed, JOURDAN drain removed, hemorrhage controlled, for continuation of ICU care.     Neurology:  -Tylenol & Oxy and Dilaudid PRN for pain  -Seroquel 50 for sleep     Resp:  - placed on CPAP overnight; will place on AVAP   - f/u repeat ABG     CV:  -currently on levophed for pressure support  -goal MAP > 65    GI:  - now NPO  - Hold off on TPN for persistent fever  - Repeated CT scan as above, will follow up surgery for plan for possible washout   - Bowel regimen  - Increased secretions in drains green/brown in color    Renal:  - sinha in, strict I&O, monitor lytes, replete as needed   - IVF on hold, will resuscitate as needed  - -2L over last 24hrs    Heme:   -monitor H/H-  -ct lovenox vte ppx    ID:   - Febrile, uptrending leukocytosis started on meropenum and vancomycin  - U/A neg, blood cx pending for last PAN cx done  - c-diff negative  - continue to trend WBC    Access: Right AC 18G extended dwell peripheral IV, Left forearm 20G PIV, right femoral triple lumen, right radial A line    Dispo: SICU     CC Diagnosis: Septic shock, respiratory abnormality, malnutrition, peritonitis, abdominal abscess  The patient is a critical care patient with life threatening hemodynamic and metabolic instability in SICU.  I have personally interviewed when possible and examined the patient, reviewed data and laboratory tests/x-rays and all pertinent electronic images.  I was physically present for the key portions of the evaluation and management (E/M) service provided.   The SICU team has a constant risk benefit analyzes discussion with the primary team, all consultants, House Staff and PA's on all decisions.  These diagnoses are unrelated to the surgical procedure noted above.  I meet with family if needed to get further history, discuss the case and make care decisions for this patient who might not be able to participate.  Time involved in performance of separately billable procedures was not counted toward my critical care time. There is no overlap.  I spent 55-75 minutes of critical care time for the diagnoses, assessment, plan and interventions.

## 2019-02-03 NOTE — PROCEDURE NOTE - NSINDICATIONS_GEN_A_CORE
initiate HEP. []Met  []Partially met  [x]Not met   Short term goal 2: Pt will improve L knee flexion ROM to >/= 75* to progress functional mobility. []Met   []Partially met  [x]Not met      []Met   []Partially met  []Not met      []Met   []Partially met  []Not met     Long Term Goals - Time Frame for Long term goals : 6 weeks  Long term goal 1: Pt will be independent and compliant with HEP. []Met  []Partially met  [x]Not met   Long term goal 2: Pt will demo L knee flexion AROM to be WNL compared bilaterally for improved functional mobility. []Met  []Partially met  [x]Not met   Long term goal 3: Pt will ambulate household/community distances without AD and with normalized gait pattern to restore toward PLOF. []Met  []Partially met  [x]Not met   Long term goal 4: Pt will improve L quad and HS strength grossly to 4+/5 for ease of squatting and stair navigation. []Met  []Partially met  [x]Not met   Long term goal 5: Pt will improve L hip strength grossly to 4+/5 for improved knee stability and gait quality.   []Met  []Partially met  [x]Not met       Minutes Tracking:  Time In: 7960  Time Out: 10[address]  Minutes: [address]     Date: 10/8/2018
cannulation purposes/critical patient/arterial puncture to obtain ABG's/monitoring purposes
critical patient/Patient with acute hypotension with rapidly increasing vasopressor requirements
critical illness/hemodynamic monitoring/volume resuscitation
critical illness/venous access/volume resuscitation
critical patient/monitoring purposes
venous access

## 2019-02-03 NOTE — PROGRESS NOTE ADULT - ASSESSMENT
54y Female PMHx HTN, T2DM, asthma, depression with recent diagnosis of invasive ampullary adenocarcinoma of the CBD admitted for cholangitis on 1/5, s/p ERCP 1/8, whipple 1/11, rapid response for GIB on floor 1/18 s/p MTP, RTOR s/p celiotomy, evacuation of hematoma, GDA bleed stopped and sutured, Abthera vac. Closure of abdomen with strattice mesh on 1/21, with inna drains and left lower JOURDAN drain in subcutaneous. Incisional bleed 1/23, bedside skin staples removed, JOURDAN drain removed, hemorrhage controlled, for continuation of ICU care.     PLAN:  -pain control as needed  -lovenox for DVT ppx  -PT/oob  -NPO/IVF  -monitor VS  -IR consult for possible drainage of LUQ collection  -recommend placement of [place] Tube for enteral feeding  -c/w broad spectrum abx  -appreciate SICU care    D Team Surgery  [id number]

## 2019-02-03 NOTE — PROGRESS NOTE ADULT - SUBJECTIVE AND OBJECTIVE BOX
SICU Daily Progress Note  =====================================================  Interval Events: Patient hypotensive overnight, started on pressors. Central and arterial lines placed. Given 5mg Vit K this morning for coagulopathy. CT A/P today showing peritonitis with multiple enhancing abscesses.     HPI  54y Female PMHx HTN, T2DM, asthma, depression with recent diagnosis of invasive ampullary adenocarcinoma of the CBD admitted for cholangitis on 1/5, s/p ERCP 1/8, whipple 1/11, rapid response for GIB on floor 1/18 s/p MTP, RTOR s/p celiotomy, evacuation of hematoma, GDA bleed stopped and sutured, Abthera vac. Closure of abdomen with strattice mesh on 1/21, with inna drains and left lower JOURDAN drain in subcutaneous, s/p incisional bleed 1/23, bedside skin staples removed, JOURDAN drain removed, hemorrhage controlled, for continuation of ICU care for respiratory failure.    Allergies: No Known Allergies      MEDICATIONS:   --------------------------------------------------------------------------------------  Neurologic Medications  acetaminophen    Suspension .. 650 milliGRAM(s) Oral every 6 hours PRN Moderate Pain (4 - 6)  HYDROmorphone  Injectable 0.5 milliGRAM(s) IV Push every 4 hours PRN Pain unrelieved by PO  oxyCODONE    IR 5 milliGRAM(s) Oral every 4 hours PRN Moderate Pain (4 - 6)  oxyCODONE    IR 10 milliGRAM(s) Oral every 4 hours PRN Severe Pain (7 - 10)  QUEtiapine 50 milliGRAM(s) Oral at bedtime    Respiratory Medications    Cardiovascular Medications  norepinephrine Infusion 0.05 MICROgram(s)/kG/Min IV Continuous <Continuous>    Gastrointestinal Medications  lactated ringers. 1000 milliLiter(s) IV Continuous <Continuous>    Genitourinary Medications    Hematologic/Oncologic Medications  enoxaparin Injectable 40 milliGRAM(s) SubCutaneous daily    Antimicrobial/Immunologic Medications  meropenem  IVPB 1000 milliGRAM(s) IV Intermittent every 8 hours    Endocrine/Metabolic Medications  insulin lispro (HumaLOG) corrective regimen sliding scale   SubCutaneous every 6 hours  vasopressin Infusion 0.04 Unit(s)/Min IV Continuous <Continuous>    Topical/Other Medications  artificial  tears Solution 1 Drop(s) Both EYES every 12 hours  chlorhexidine 4% Liquid 1 Application(s) Topical <User Schedule>    --------------------------------------------------------------------------------------    VITAL SIGNS, INS/OUTS (last 24 hours):  --------------------------------------------------------------------------------------  ((Insert SICU Vitals/Is+Os here))***  --------------------------------------------------------------------------------------    EXAM  NEUROLOGY  Exam: Somnolent, but arousable     HEENT  Exam: Normocephalic, atraumatic    RESPIRATORY  Exam: Lungs clear to auscultation, Normal expansion/effort. transmitted BS b/l, worst at bases    CARDIOVASCULAR  Exam: S1, S2.  Regular rate and rhythm.      GI/NUTRITION  Exam: Abdomen soft with mild to moderate distended, NTTP, abdominal wound open and packed with external tissue expander in place, clean border, no purulent drainage noted or surrounding erythema, clear brown fluid in JOURDAN drains    Current Diet:  NPO    VASCULAR  Exam: Extremities warm, pink, well-perfused.     MUSCULOSKELETAL  Exam: All extremities moving spontaneously without limitations.     SKIN  Exam: Good skin turgor, no skin breakdown.     METABOLIC/FLUIDS/ELECTROLYTES  lactated ringers. 1000 milliLiter(s) IV Continuous <Continuous>      HEMATOLOGIC  [x] VTE Prophylaxis: enoxaparin Injectable 40 milliGRAM(s) SubCutaneous daily    Transfusions:	[] PRBC	[] Platelets		[] FFP	[] Cryoprecipitate    INFECTIOUS DISEASE  Antimicrobials/Immunologic Medications:  meropenem  IVPB 1000 milliGRAM(s) IV Intermittent every 8 hours    Day #      of     ***    Tubes/Lines/Drains  ***  [x] Peripheral IV  [] Central Venous Line     	[] R	[] L	[] IJ	[] Fem	[] SC	Date Placed:   [] Arterial Line		[] R	[] L	[] Fem	[] Rad	[] Ax	Date Placed:   [] PICC		[] Midline		[] Mediport  [] Urinary Catheter		Date Placed:   [x] Necessity of urinary, arterial, and venous catheters discussed    LABS  --------------------------------------------------------------------------------------  ((Insert SICU Labs here))***  --------------------------------------------------------------------------------------    OTHER LABORATORY:     IMAGING STUDIES:   CXR: SICU Daily Progress Note  =====================================================  Interval Events: Patient hypotensive overnight, started on levo/vaso for several hours held since 4AM. Central and arterial lines placed. Given 5mg Vit K this morning for coagulopathy. CT A/P today showing peritonitis with multiple enhancing abscesses. Placed on CPAP early AM.    HPI  54y Female PMHx HTN, T2DM, asthma, depression with recent diagnosis of invasive ampullary adenocarcinoma of the CBD admitted for cholangitis on 1/5, s/p ERCP 1/8, whipple 1/11, rapid response for GIB on floor 1/18 s/p MTP, RTOR s/p celiotomy, evacuation of hematoma, GDA bleed stopped and sutured, Abthera vac. Closure of abdomen with strattice mesh on 1/21, with inna drains and left lower JOURDAN drain in subcutaneous, s/p incisional bleed 1/23, bedside skin staples removed, JOURDAN drain removed, hemorrhage controlled, for continuation of ICU care for respiratory failure.    Allergies: No Known Allergies      MEDICATIONS:   --------------------------------------------------------------------------------------  Neurologic Medications  acetaminophen    Suspension .. 650 milliGRAM(s) Oral every 6 hours PRN Moderate Pain (4 - 6)  HYDROmorphone  Injectable 0.5 milliGRAM(s) IV Push every 4 hours PRN Pain unrelieved by PO  oxyCODONE    IR 5 milliGRAM(s) Oral every 4 hours PRN Moderate Pain (4 - 6)  oxyCODONE    IR 10 milliGRAM(s) Oral every 4 hours PRN Severe Pain (7 - 10)  QUEtiapine 50 milliGRAM(s) Oral at bedtime    Respiratory Medications    Cardiovascular Medications  norepinephrine Infusion 0.05 MICROgram(s)/kG/Min IV Continuous <Continuous>    Gastrointestinal Medications  lactated ringers. 1000 milliLiter(s) IV Continuous <Continuous>    Genitourinary Medications    Hematologic/Oncologic Medications  enoxaparin Injectable 40 milliGRAM(s) SubCutaneous daily    Antimicrobial/Immunologic Medications  meropenem  IVPB 1000 milliGRAM(s) IV Intermittent every 8 hours    Endocrine/Metabolic Medications  insulin lispro (HumaLOG) corrective regimen sliding scale   SubCutaneous every 6 hours  vasopressin Infusion 0.04 Unit(s)/Min IV Continuous <Continuous>    Topical/Other Medications  artificial  tears Solution 1 Drop(s) Both EYES every 12 hours  chlorhexidine 4% Liquid 1 Application(s) Topical <User Schedule>    --------------------------------------------------------------------------------------    VITAL SIGNS, INS/OUTS (last 24 hours):  --------------------------------------------------------------------------------------  ((Insert SICU Vitals/Is+Os here))***  --------------------------------------------------------------------------------------    EXAM  NEUROLOGY  Exam: Somnolent, but arousable     HEENT  Exam: Normocephalic, atraumatic    RESPIRATORY  Exam: Lungs clear to auscultation, Normal expansion/effort. transmitted BS b/l, worst at bases    CARDIOVASCULAR  Exam: S1, S2.  Regular rate and rhythm.      GI/NUTRITION  Exam: Abdomen soft with mild to moderate distended, NTTP, abdominal wound open and packed with external tissue expander in place, clean border, no purulent drainage noted or surrounding erythema, clear brown fluid in JOURDAN drains    Current Diet:  NPO    VASCULAR  Exam: Extremities warm, pink, well-perfused.     MUSCULOSKELETAL  Exam: All extremities moving spontaneously without limitations.     SKIN  Exam: Good skin turgor, no skin breakdown.     METABOLIC/FLUIDS/ELECTROLYTES  lactated ringers. 1000 milliLiter(s) IV Continuous <Continuous>      HEMATOLOGIC  [x] VTE Prophylaxis: enoxaparin Injectable 40 milliGRAM(s) SubCutaneous daily    Transfusions:	[] PRBC	[] Platelets		[] FFP	[] Cryoprecipitate    INFECTIOUS DISEASE  Antimicrobials/Immunologic Medications:  meropenem  IVPB 1000 milliGRAM(s) IV Intermittent every 8 hours    Day #      of     ***    Tubes/Lines/Drains  ***  [x] Peripheral IV  [] Central Venous Line     	[] R	[] L	[] IJ	[] Fem	[] SC	Date Placed:   [x] Arterial Line		[] R	[] L	[] Fem	[] Rad	[x] Ax	Date Placed:   [] PICC		[] Midline		[] Mediport  [] Urinary Catheter		Date Placed:   [x] Necessity of urinary, arterial, and venous catheters discussed    LABS  --------------------------------------------------------------------------------------  ((Insert SICU Labs here))***  --------------------------------------------------------------------------------------    OTHER LABORATORY:     IMAGING STUDIES:   CXR: SICU Daily Progress Note  =====================================================  Interval Events: Patient hypotensive overnight, started on levo/vaso for several hours held since 4AM. Central and arterial lines placed. Given 5mg Vit K this morning for coagulopathy. CT A/P today showing peritonitis with multiple enhancing abscesses. Drop in H/H to 6.9/21.5 given 2 units prbc. Placed on CPAP early AM.    HPI  54y Female PMHx HTN, T2DM, asthma, depression with recent diagnosis of invasive ampullary adenocarcinoma of the CBD admitted for cholangitis on 1/5, s/p ERCP 1/8, whipple 1/11, rapid response for GIB on floor 1/18 s/p MTP, RTOR s/p celiotomy, evacuation of hematoma, GDA bleed stopped and sutured, Abthera vac. Closure of abdomen with strattice mesh on 1/21, with inna drains and left lower JOURDAN drain in subcutaneous, s/p incisional bleed 1/23, bedside skin staples removed, JOURDAN drain removed, hemorrhage controlled, for continuation of ICU care for respiratory failure.    Allergies: No Known Allergies      MEDICATIONS:   --------------------------------------------------------------------------------------  Neurologic Medications  acetaminophen    Suspension .. 650 milliGRAM(s) Oral every 6 hours PRN Moderate Pain (4 - 6)  HYDROmorphone  Injectable 0.5 milliGRAM(s) IV Push every 4 hours PRN Pain unrelieved by PO  oxyCODONE    IR 5 milliGRAM(s) Oral every 4 hours PRN Moderate Pain (4 - 6)  oxyCODONE    IR 10 milliGRAM(s) Oral every 4 hours PRN Severe Pain (7 - 10)  QUEtiapine 50 milliGRAM(s) Oral at bedtime    Respiratory Medications    Cardiovascular Medications  norepinephrine Infusion 0.05 MICROgram(s)/kG/Min IV Continuous <Continuous>    Gastrointestinal Medications  lactated ringers. 1000 milliLiter(s) IV Continuous <Continuous>    Genitourinary Medications    Hematologic/Oncologic Medications  enoxaparin Injectable 40 milliGRAM(s) SubCutaneous daily    Antimicrobial/Immunologic Medications  meropenem  IVPB 1000 milliGRAM(s) IV Intermittent every 8 hours    Endocrine/Metabolic Medications  insulin lispro (HumaLOG) corrective regimen sliding scale   SubCutaneous every 6 hours  vasopressin Infusion 0.04 Unit(s)/Min IV Continuous <Continuous>    Topical/Other Medications  artificial  tears Solution 1 Drop(s) Both EYES every 12 hours  chlorhexidine 4% Liquid 1 Application(s) Topical <User Schedule>    --------------------------------------------------------------------------------------    VITAL SIGNS, INS/OUTS (last 24 hours):  --------------------------------------------------------------------------------------  ((Insert SICU Vitals/Is+Os here))***  --------------------------------------------------------------------------------------    EXAM  NEUROLOGY  Exam: Somnolent, but arousable     HEENT  Exam: Normocephalic, atraumatic    RESPIRATORY  Exam: Lungs clear to auscultation, Normal expansion/effort. transmitted BS b/l, worst at bases    CARDIOVASCULAR  Exam: S1, S2.  Regular rate and rhythm.      GI/NUTRITION  Exam: Abdomen soft with mild to moderate distended, NTTP, abdominal wound open and packed with external tissue expander in place, clean border, no purulent drainage noted or surrounding erythema, clear brown fluid in JOURDAN drains    Current Diet:  NPO    VASCULAR  Exam: Extremities warm, pink, well-perfused.     MUSCULOSKELETAL  Exam: All extremities moving spontaneously without limitations.     SKIN  Exam: Good skin turgor, no skin breakdown.     METABOLIC/FLUIDS/ELECTROLYTES  lactated ringers. 1000 milliLiter(s) IV Continuous <Continuous>      HEMATOLOGIC  [x] VTE Prophylaxis: enoxaparin Injectable 40 milliGRAM(s) SubCutaneous daily    Transfusions:	[] PRBC	[] Platelets		[] FFP	[] Cryoprecipitate    INFECTIOUS DISEASE  Antimicrobials/Immunologic Medications:  meropenem  IVPB 1000 milliGRAM(s) IV Intermittent every 8 hours    Day #      of     ***    Tubes/Lines/Drains  ***  [x] Peripheral IV  [] Central Venous Line     	[] R	[] L	[] IJ	[] Fem	[] SC	Date Placed:   [x] Arterial Line		[] R	[] L	[] Fem	[] Rad	[x] Ax	Date Placed:   [] PICC		[] Midline		[] Mediport  [] Urinary Catheter		Date Placed:   [x] Necessity of urinary, arterial, and venous catheters discussed    LABS  --------------------------------------------------------------------------------------  ((Insert SICU Labs here))***  --------------------------------------------------------------------------------------    OTHER LABORATORY:     IMAGING STUDIES:   CXR:

## 2019-02-03 NOTE — PROCEDURE NOTE - NSINFORMCONSENT_GEN_A_CORE
Benefits, risks, and possible complications of procedure explained to patient/caregiver who verbalized understanding and gave verbal consent.
Verbal consent from HCP Cintiaa at bedside/This was an emergent procedure.
Benefits, risks, and possible complications of procedure explained to patient/caregiver who verbalized understanding and gave verbal consent.
Benefits, risks, and possible complications of procedure explained to patient/caregiver who verbalized understanding and gave verbal consent.
Benefits, risks, and possible complications of procedure explained to patient/caregiver who verbalized understanding and gave written consent.
This was an emergent procedure.

## 2019-02-03 NOTE — PROGRESS NOTE ADULT - ASSESSMENT
ASSESSMENT:  54y Female PMHx HTN, T2DM, asthma, depression with recent diagnosis of invasive ampullary adenocarcinoma of the CBD admitted for cholangitis on 1/5, s/p ERCP 1/8, whipple 1/11, rapid response for GIB on floor 1/18 s/p MTP, RTOR s/p celiotomy, evacuation of hematoma, GDA bleed stopped and sutured, Abthera vac. Closure of abdomen with strattice mesh on 1/21, with inna drains and left lower JOURDAN drain in subcutaneous. Incisional bleed 1/23, bedside skin staples removed, JOURDAN drain removed, hemorrhage controlled, for continuation of ICU care.     Neurology:  -Tylenol & Oxy and Dilaudid PRN for pain  -Seroquel 50 for sleep     Resp:  -not intubated, NC with supplemented O2 if needed. Sats remained stable, continue continuous pulse ox monitoring    CV:  -currently on levophed for pressure support  -goal MAP > 65    GI:  - now NPO  - Hold off on TPN for persistent fever  - Repeated CT scan as above, will follow up surgery for plan (re: possible wash out?)(  - Bowel regimen  - Increased secretions in drains green/brown in color    Renal:  - sinha in, strict I&O, monitor lytes, replete as needed   - IVF on hold, will resuscitate as needed  - -2L over last 24hrs    Heme:   -monitor H/H-  -ct lovenox vte ppx    ID:   - Febrile to 101.3, given stat dose of meropenum and vancomycin  - U/A neg, blood cx pending for last PAN cx done  - c-diff negative  - continue to trend WBC  - rpt CT abd/pelv    Access: Right AC 18G extended dwell peripheral IV, Left forearm 20G PIV, right femoral triple lumen, right radial A line    Dispo: SICU     CC Diagnosis: Septic shock, respiratory abnormality, malnutrition, peritonitis, abdominal abscess ASSESSMENT:  54y Female PMHx HTN, T2DM, asthma, depression with recent diagnosis of invasive ampullary adenocarcinoma of the CBD admitted for cholangitis on 1/5, s/p ERCP 1/8, whipple 1/11, rapid response for GIB on floor 1/18 s/p MTP, RTOR s/p celiotomy, evacuation of hematoma, GDA bleed stopped and sutured, Abthera vac. Closure of abdomen with strattice mesh on 1/21, with inna drains and left lower JOURDAN drain in subcutaneous. Incisional bleed 1/23, bedside skin staples removed, JOURDAN drain removed, hemorrhage controlled, for continuation of ICU care.     Neurology:  -Tylenol & Oxy and Dilaudid PRN for pain  -Seroquel 50 for sleep     Resp:  -not intubated, NC with supplemented O2 if needed. Sats remained stable, continue continuous pulse ox monitoring    CV:  -currently on levophed for pressure support  -goal MAP > 65    GI:  - now NPO  - Hold off on TPN for persistent fever  - Repeated CT scan as above, will follow up surgery for plan for possible washout   - Bowel regimen  - Increased secretions in drains green/brown in color    Renal:  - sinha in, strict I&O, monitor lytes, replete as needed   - IVF on hold, will resuscitate as needed  - -2L over last 24hrs    Heme:   -monitor H/H-  -ct lovenox vte ppx    ID:   - Febrile, uptrending leukocytosis started on meropenum and vancomycin  - U/A neg, blood cx pending for last PAN cx done  - c-diff negative  - continue to trend WBC    Access: Right AC 18G extended dwell peripheral IV, Left forearm 20G PIV, right femoral triple lumen, right radial A line    Dispo: SICU     CC Diagnosis: Septic shock, respiratory abnormality, malnutrition, peritonitis, abdominal abscess ASSESSMENT:  54y Female PMHx HTN, T2DM, asthma, depression with recent diagnosis of invasive ampullary adenocarcinoma of the CBD admitted for cholangitis on 1/5, s/p ERCP 1/8, whipple 1/11, rapid response for GIB on floor 1/18 s/p MTP, RTOR s/p celiotomy, evacuation of hematoma, GDA bleed stopped and sutured, Abthera vac. Closure of abdomen with strattice mesh on 1/21, with inna drains and left lower JOURDAN drain in subcutaneous. Incisional bleed 1/23, bedside skin staples removed, JOURDAN drain removed, hemorrhage controlled, for continuation of ICU care.     Neurology:  -Tylenol & Oxy and Dilaudid PRN for pain  -Seroquel 50 for sleep     Resp:  - placed on CPAP overnight; will place on AVAP   - f/u repeat ABG     CV:  -currently on levophed for pressure support  -goal MAP > 65    GI:  - now NPO  - Hold off on TPN for persistent fever  - Repeated CT scan as above, will follow up surgery for plan for possible washout   - Bowel regimen  - Increased secretions in drains green/brown in color    Renal:  - sinha in, strict I&O, monitor lytes, replete as needed   - IVF on hold, will resuscitate as needed  - -2L over last 24hrs    Heme:   -monitor H/H-  -ct lovenox vte ppx    ID:   - Febrile, uptrending leukocytosis started on meropenum and vancomycin  - U/A neg, blood cx pending for last PAN cx done  - c-diff negative  - continue to trend WBC    Access: Right AC 18G extended dwell peripheral IV, Left forearm 20G PIV, right femoral triple lumen, right radial A line    Dispo: SICU     CC Diagnosis: Septic shock, respiratory abnormality, malnutrition, peritonitis, abdominal abscess

## 2019-02-03 NOTE — PROCEDURE NOTE - PROCEDURE DATE TIME, MLM
02-Feb-2019
03-Feb-2019 05:18
02-Feb-2019 02:56
18-Jan-2019 20:04
23-Jan-2019 16:18
23-Jan-2019 20:13

## 2019-02-03 NOTE — PROGRESS NOTE ADULT - SUBJECTIVE AND OBJECTIVE BOX
Surgery Progress Note    S: Patient seen and examined. No acute events overnight. Patient underwent CT A/P yesterday which showed large LUQ collection and multiple smaller intraabdominal collections. Patient remains on pressor support this AM. Patient has had minimal PO intake over the past several days.     O:  Vital Signs Last 24 Hrs  T(C): 37 (03 Feb 2019 08:00), Max: 37.9 (02 Feb 2019 12:00)  T(F): 98.6 (03 Feb 2019 08:00), Max: 100.3 (02 Feb 2019 12:00)  HR: 75 (03 Feb 2019 10:00) (70 - 91)  BP: 135/51 (03 Feb 2019 05:00) (105/46 - 136/39)  BP(mean): 70 (03 Feb 2019 05:00) (60 - 70)  RR: 26 (03 Feb 2019 10:00) (15 - 33)  SpO2: 96% (03 Feb 2019 10:00) (93% - 98%)    I&O's Detail    02 Feb 2019 07:01  -  03 Feb 2019 07:00  --------------------------------------------------------  IN:    dextrose 5% + sodium chloride 0.45%.: 100 mL    IV PiggyBack: 400 mL    lactated ringers.: 1000 mL    norepinephrine Infusion: 372.6 mL    Packed Red Blood Cells: 600 mL    vasopressin Infusion: 48 mL  Total IN: 2520.6 mL    OUT:    Bulb: 285 mL    Bulb: 165 mL    Indwelling Catheter - Urethral: 636 mL    Rectal Tube: 40 mL  Total OUT: 1126 mL    Total NET: 1394.6 mL      03 Feb 2019 07:01  -  03 Feb 2019 10:56  --------------------------------------------------------  IN:    lactated ringers.: 150 mL    norepinephrine Infusion: 17.7 mL    vasopressin Infusion: 7.2 mL  Total IN: 174.9 mL    OUT:    Indwelling Catheter - Urethral: 50 mL  Total OUT: 50 mL    Total NET: 124.9 mL          MEDICATIONS  (STANDING):  artificial  tears Solution 1 Drop(s) Both EYES every 12 hours  chlorhexidine 4% Liquid 1 Application(s) Topical <User Schedule>  enoxaparin Injectable 40 milliGRAM(s) SubCutaneous daily  fluconAZOLE IVPB      fluconAZOLE IVPB 200 milliGRAM(s) IV Intermittent once  insulin lispro (HumaLOG) corrective regimen sliding scale   SubCutaneous every 6 hours  lactated ringers. 1000 milliLiter(s) (50 mL/Hr) IV Continuous <Continuous>  meropenem  IVPB 1000 milliGRAM(s) IV Intermittent every 8 hours  norepinephrine Infusion 0.05 MICROgram(s)/kG/Min (2.691 mL/Hr) IV Continuous <Continuous>  QUEtiapine 50 milliGRAM(s) Oral at bedtime  vancomycin  IVPB      vancomycin  IVPB 750 milliGRAM(s) IV Intermittent every 12 hours  vasopressin Infusion 0.04 Unit(s)/Min (2.4 mL/Hr) IV Continuous <Continuous>    MEDICATIONS  (PRN):  acetaminophen    Suspension .. 650 milliGRAM(s) Oral every 6 hours PRN Moderate Pain (4 - 6)  HYDROmorphone  Injectable 0.5 milliGRAM(s) IV Push every 4 hours PRN Pain unrelieved by PO  oxyCODONE    IR 5 milliGRAM(s) Oral every 4 hours PRN Moderate Pain (4 - 6)  oxyCODONE    IR 10 milliGRAM(s) Oral every 4 hours PRN Severe Pain (7 - 10)                            6.9    28.85 )-----------( 403      ( 03 Feb 2019 02:10 )             21.5       02-03    138  |  101  |  32<H>  ----------------------------<  139<H>  4.2   |  18<L>  |  0.91    Ca    8.0<L>      03 Feb 2019 02:10  Phos  3.7     02-03  Mg     2.1     02-03    TPro  6.2  /  Alb  2.3<L>  /  TBili  3.3<H>  /  DBili  1.1<H>  /  AST  88<H>  /  ALT  40<H>  /  AlkPhos  638<H>  02-02      Physical Exam:  Gen: Laying in bed, NAD  Resp: Unlabored breathing with CPAP in place  Abd: soft, NTND, midline incision re-packed, RUQ and LLQ JOURDAN bilious, no rebound or guarding  Ext: WWP  Skin: No rashes  : sinha in place draining yellow/clear urine

## 2019-02-04 LAB
ALBUMIN SERPL ELPH-MCNC: 2 G/DL — LOW (ref 3.3–5)
ALP SERPL-CCNC: 319 U/L — HIGH (ref 40–120)
ALT FLD-CCNC: 24 U/L — SIGNIFICANT CHANGE UP (ref 4–33)
AMYLASE FLD-CCNC: 21 U/L — SIGNIFICANT CHANGE UP
ANION GAP SERPL CALC-SCNC: 10 MMO/L — SIGNIFICANT CHANGE UP (ref 7–14)
ANION GAP SERPL CALC-SCNC: 11 MMO/L — SIGNIFICANT CHANGE UP (ref 7–14)
APTT BLD: 29.1 SEC — SIGNIFICANT CHANGE UP (ref 27.5–36.3)
APTT BLD: 30.7 SEC — SIGNIFICANT CHANGE UP (ref 27.5–36.3)
AST SERPL-CCNC: 50 U/L — HIGH (ref 4–32)
BASE EXCESS BLDA CALC-SCNC: 2.1 MMOL/L — SIGNIFICANT CHANGE UP
BASOPHILS # BLD AUTO: 0.05 K/UL — SIGNIFICANT CHANGE UP (ref 0–0.2)
BASOPHILS NFR BLD AUTO: 0.3 % — SIGNIFICANT CHANGE UP (ref 0–2)
BILIRUB SERPL-MCNC: 5 MG/DL — HIGH (ref 0.2–1.2)
BUN SERPL-MCNC: 29 MG/DL — HIGH (ref 7–23)
BUN SERPL-MCNC: 34 MG/DL — HIGH (ref 7–23)
CALCIUM SERPL-MCNC: 7.9 MG/DL — LOW (ref 8.4–10.5)
CALCIUM SERPL-MCNC: 7.9 MG/DL — LOW (ref 8.4–10.5)
CHLORIDE BLDA-SCNC: 105 MMOL/L — SIGNIFICANT CHANGE UP (ref 96–108)
CHLORIDE SERPL-SCNC: 100 MMOL/L — SIGNIFICANT CHANGE UP (ref 98–107)
CHLORIDE SERPL-SCNC: 101 MMOL/L — SIGNIFICANT CHANGE UP (ref 98–107)
CO2 SERPL-SCNC: 24 MMOL/L — SIGNIFICANT CHANGE UP (ref 22–31)
CO2 SERPL-SCNC: 25 MMOL/L — SIGNIFICANT CHANGE UP (ref 22–31)
CREAT SERPL-MCNC: 0.72 MG/DL — SIGNIFICANT CHANGE UP (ref 0.5–1.3)
CREAT SERPL-MCNC: 0.8 MG/DL — SIGNIFICANT CHANGE UP (ref 0.5–1.3)
EOSINOPHIL # BLD AUTO: 0.16 K/UL — SIGNIFICANT CHANGE UP (ref 0–0.5)
EOSINOPHIL NFR BLD AUTO: 1 % — SIGNIFICANT CHANGE UP (ref 0–6)
GLUCOSE BLDA-MCNC: 118 MG/DL — HIGH (ref 70–99)
GLUCOSE SERPL-MCNC: 116 MG/DL — HIGH (ref 70–99)
GLUCOSE SERPL-MCNC: 138 MG/DL — HIGH (ref 70–99)
GRAM STN WND: SIGNIFICANT CHANGE UP
HCO3 BLDA-SCNC: 27 MMOL/L — HIGH (ref 22–26)
HCT VFR BLD CALC: 26.4 % — LOW (ref 34.5–45)
HCT VFR BLDA CALC: 36.7 % — SIGNIFICANT CHANGE UP (ref 34.5–46.5)
HGB BLD-MCNC: 9.1 G/DL — LOW (ref 11.5–15.5)
HGB BLDA-MCNC: 11.9 G/DL — SIGNIFICANT CHANGE UP (ref 11.5–15.5)
IMM GRANULOCYTES NFR BLD AUTO: 4.3 % — HIGH (ref 0–1.5)
INR BLD: 1.64 — HIGH (ref 0.88–1.17)
INR BLD: 1.69 — HIGH (ref 0.88–1.17)
LACTATE BLDA-SCNC: 1.8 MMOL/L — SIGNIFICANT CHANGE UP (ref 0.5–2)
LYMPHOCYTES # BLD AUTO: 1.65 K/UL — SIGNIFICANT CHANGE UP (ref 1–3.3)
LYMPHOCYTES # BLD AUTO: 9.9 % — LOW (ref 13–44)
MAGNESIUM SERPL-MCNC: 1.9 MG/DL — SIGNIFICANT CHANGE UP (ref 1.6–2.6)
MAGNESIUM SERPL-MCNC: 2.1 MG/DL — SIGNIFICANT CHANGE UP (ref 1.6–2.6)
MCHC RBC-ENTMCNC: 29.2 PG — SIGNIFICANT CHANGE UP (ref 27–34)
MCHC RBC-ENTMCNC: 34.5 % — SIGNIFICANT CHANGE UP (ref 32–36)
MCV RBC AUTO: 84.6 FL — SIGNIFICANT CHANGE UP (ref 80–100)
MONOCYTES # BLD AUTO: 1.37 K/UL — HIGH (ref 0–0.9)
MONOCYTES NFR BLD AUTO: 8.2 % — SIGNIFICANT CHANGE UP (ref 2–14)
NEUTROPHILS # BLD AUTO: 12.7 K/UL — HIGH (ref 1.8–7.4)
NEUTROPHILS NFR BLD AUTO: 76.3 % — SIGNIFICANT CHANGE UP (ref 43–77)
NRBC # FLD: 0.09 K/UL — LOW (ref 25–125)
PCO2 BLDA: 36 MMHG — SIGNIFICANT CHANGE UP (ref 32–48)
PH BLDA: 7.47 PH — HIGH (ref 7.35–7.45)
PHOSPHATE SERPL-MCNC: 2.3 MG/DL — LOW (ref 2.5–4.5)
PHOSPHATE SERPL-MCNC: 2.7 MG/DL — SIGNIFICANT CHANGE UP (ref 2.5–4.5)
PLATELET # BLD AUTO: 301 K/UL — SIGNIFICANT CHANGE UP (ref 150–400)
PMV BLD: 10 FL — SIGNIFICANT CHANGE UP (ref 7–13)
PO2 BLDA: 88 MMHG — SIGNIFICANT CHANGE UP (ref 83–108)
POTASSIUM BLDA-SCNC: 2.8 MMOL/L — CRITICAL LOW (ref 3.4–4.5)
POTASSIUM SERPL-MCNC: 3 MMOL/L — LOW (ref 3.5–5.3)
POTASSIUM SERPL-MCNC: 4.1 MMOL/L — SIGNIFICANT CHANGE UP (ref 3.5–5.3)
POTASSIUM SERPL-SCNC: 3 MMOL/L — LOW (ref 3.5–5.3)
POTASSIUM SERPL-SCNC: 4.1 MMOL/L — SIGNIFICANT CHANGE UP (ref 3.5–5.3)
PROT SERPL-MCNC: 5.2 G/DL — LOW (ref 6–8.3)
PROTHROM AB SERPL-ACNC: 18.5 SEC — HIGH (ref 9.8–13.1)
PROTHROM AB SERPL-ACNC: 19.6 SEC — HIGH (ref 9.8–13.1)
RBC # BLD: 3.12 M/UL — LOW (ref 3.8–5.2)
RBC # FLD: 16.5 % — HIGH (ref 10.3–14.5)
SAO2 % BLDA: 97 % — SIGNIFICANT CHANGE UP (ref 95–99)
SODIUM BLDA-SCNC: 133 MMOL/L — LOW (ref 136–146)
SODIUM SERPL-SCNC: 135 MMOL/L — SIGNIFICANT CHANGE UP (ref 135–145)
SODIUM SERPL-SCNC: 136 MMOL/L — SIGNIFICANT CHANGE UP (ref 135–145)
SPECIMEN SOURCE: SIGNIFICANT CHANGE UP
VANCOMYCIN TROUGH SERPL-MCNC: 20.4 UG/ML — HIGH (ref 10–20)
WBC # BLD: 16.64 K/UL — HIGH (ref 3.8–10.5)
WBC # FLD AUTO: 16.64 K/UL — HIGH (ref 3.8–10.5)

## 2019-02-04 PROCEDURE — 99291 CRITICAL CARE FIRST HOUR: CPT

## 2019-02-04 PROCEDURE — 49406 IMAGE CATH FLUID PERI/RETRO: CPT

## 2019-02-04 PROCEDURE — 74018 RADEX ABDOMEN 1 VIEW: CPT | Mod: 26

## 2019-02-04 PROCEDURE — 74018 RADEX ABDOMEN 1 VIEW: CPT | Mod: 26,77,76

## 2019-02-04 RX ORDER — PANTOPRAZOLE SODIUM 20 MG/1
40 TABLET, DELAYED RELEASE ORAL DAILY
Qty: 0 | Refills: 0 | Status: DISCONTINUED | OUTPATIENT
Start: 2019-02-04 | End: 2019-02-06

## 2019-02-04 RX ORDER — PHYTONADIONE (VIT K1) 5 MG
5 TABLET ORAL ONCE
Qty: 0 | Refills: 0 | Status: COMPLETED | OUTPATIENT
Start: 2019-02-04 | End: 2019-02-04

## 2019-02-04 RX ORDER — ACETAMINOPHEN 500 MG
1000 TABLET ORAL ONCE
Qty: 0 | Refills: 0 | Status: COMPLETED | OUTPATIENT
Start: 2019-02-04 | End: 2019-02-04

## 2019-02-04 RX ORDER — DEXTROSE MONOHYDRATE, SODIUM CHLORIDE, AND POTASSIUM CHLORIDE 50; .745; 4.5 G/1000ML; G/1000ML; G/1000ML
1000 INJECTION, SOLUTION INTRAVENOUS
Qty: 0 | Refills: 0 | Status: DISCONTINUED | OUTPATIENT
Start: 2019-02-04 | End: 2019-02-05

## 2019-02-04 RX ORDER — POTASSIUM CHLORIDE 20 MEQ
20 PACKET (EA) ORAL
Qty: 0 | Refills: 0 | Status: COMPLETED | OUTPATIENT
Start: 2019-02-04 | End: 2019-02-04

## 2019-02-04 RX ADMIN — FLUCONAZOLE 100 MILLIGRAM(S): 150 TABLET ORAL at 10:54

## 2019-02-04 RX ADMIN — MEROPENEM 100 MILLIGRAM(S): 1 INJECTION INTRAVENOUS at 15:23

## 2019-02-04 RX ADMIN — Medication 50 MILLIEQUIVALENT(S): at 05:50

## 2019-02-04 RX ADMIN — Medication 1.08 MICROGRAM(S)/KG/MIN: at 21:00

## 2019-02-04 RX ADMIN — HYDROMORPHONE HYDROCHLORIDE 0.5 MILLIGRAM(S): 2 INJECTION INTRAMUSCULAR; INTRAVENOUS; SUBCUTANEOUS at 19:10

## 2019-02-04 RX ADMIN — Medication 101 MILLIGRAM(S): at 07:53

## 2019-02-04 RX ADMIN — CHLORHEXIDINE GLUCONATE 1 APPLICATION(S): 213 SOLUTION TOPICAL at 05:56

## 2019-02-04 RX ADMIN — Medication 50 MILLIEQUIVALENT(S): at 04:35

## 2019-02-04 RX ADMIN — Medication 250 MILLIGRAM(S): at 05:56

## 2019-02-04 RX ADMIN — HYDROMORPHONE HYDROCHLORIDE 0.5 MILLIGRAM(S): 2 INJECTION INTRAMUSCULAR; INTRAVENOUS; SUBCUTANEOUS at 08:27

## 2019-02-04 RX ADMIN — HYDROMORPHONE HYDROCHLORIDE 0.5 MILLIGRAM(S): 2 INJECTION INTRAMUSCULAR; INTRAVENOUS; SUBCUTANEOUS at 08:07

## 2019-02-04 RX ADMIN — Medication 250 MILLIGRAM(S): at 19:28

## 2019-02-04 RX ADMIN — Medication 400 MILLIGRAM(S): at 18:51

## 2019-02-04 RX ADMIN — Medication 1000 MILLIGRAM(S): at 18:57

## 2019-02-04 RX ADMIN — MEROPENEM 100 MILLIGRAM(S): 1 INJECTION INTRAVENOUS at 05:56

## 2019-02-04 RX ADMIN — Medication 50 MILLIEQUIVALENT(S): at 03:55

## 2019-02-04 RX ADMIN — Medication 1 DROP(S): at 05:56

## 2019-02-04 RX ADMIN — HYDROMORPHONE HYDROCHLORIDE 0.5 MILLIGRAM(S): 2 INJECTION INTRAMUSCULAR; INTRAVENOUS; SUBCUTANEOUS at 18:55

## 2019-02-04 RX ADMIN — Medication 1 DROP(S): at 18:51

## 2019-02-04 RX ADMIN — DEXTROSE MONOHYDRATE, SODIUM CHLORIDE, AND POTASSIUM CHLORIDE 50 MILLILITER(S): 50; .745; 4.5 INJECTION, SOLUTION INTRAVENOUS at 18:51

## 2019-02-04 RX ADMIN — MEROPENEM 100 MILLIGRAM(S): 1 INJECTION INTRAVENOUS at 23:12

## 2019-02-04 RX ADMIN — DEXTROSE MONOHYDRATE, SODIUM CHLORIDE, AND POTASSIUM CHLORIDE 50 MILLILITER(S): 50; .745; 4.5 INJECTION, SOLUTION INTRAVENOUS at 19:28

## 2019-02-04 RX ADMIN — PANTOPRAZOLE SODIUM 40 MILLIGRAM(S): 20 TABLET, DELAYED RELEASE ORAL at 12:05

## 2019-02-04 NOTE — CHART NOTE - NSCHARTNOTEFT_GEN_A_CORE
Post-operative Check    SUBJECTIVE: No acute events in the immediate post-operative period. Pain well controlled. tiger tube in place. sinha in place with yellow urine.    OBJECTIVE:  T(C): 38.2 (02-04-19 @ 20:00), Max: 38.6 (02-04-19 @ 16:00)  HR: 81 (02-04-19 @ 22:00) (81 - 108)  BP: 108/42 (02-04-19 @ 01:00) (108/42 - 108/42)  RR: 13 (02-04-19 @ 22:00) (13 - 28)  SpO2: 95% (02-04-19 @ 22:00) (92% - 100%)      02-03-19 @ 07:01  -  02-04-19 @ 07:00  --------------------------------------------------------  IN: 2137.3 mL / OUT: 1865 mL / NET: 272.3 mL    02-04-19 @ 07:01  -  02-04-19 @ 22:33  --------------------------------------------------------  IN: 1180.4 mL / OUT: 710 mL / NET: 470.4 mL        Physical Exam:   - Constitutional: AOx3, NAD, Tiger tube in place  - Respiratory: nonlabored  - Abdomen: soft, c/d/i dressings, L. and R. JPs w/ bilious output, new IR drain w/ ss output      ASSESSMENT:   NOEMI SOUSA is a 54y Female POD#0 s/p IR drainage     PLAN:  - cont care per sicu  - Pain control as needed  - Follow UOP, labs, vs  - monitor drains output  - f/u IR cultures  - cont lovenox for dvt ppx  - oob/PT

## 2019-02-04 NOTE — PROGRESS NOTE ADULT - SUBJECTIVE AND OBJECTIVE BOX
HPI: 54y Female PMHx HTN, T2DM, asthma, depression with recent diagnosis of invasive ampullary adenocarcinoma of the CBD admitted for cholangitis on 1/5, s/p ERCP 1/8, whipple 1/11, rapid response for GIB on floor 1/18 s/p MTP, RTOR s/p celiotomy, evacuation of hematoma, GDA bleed stopped and sutured, Abthera vac. Closure of abdomen with strattice mesh on 1/21, with inna drains and left lower JOURDAN drain in subcutaneous, s/p incisional bleed 1/23, bedside skin staples removed, JOURDAN drain removed, hemorrhage controlled, for continuation of ICU care for respiratory failure requiring intubation s/p extubation 1/29, underwent CT A/P 2/2 which showed large LUQ collection and multiple smaller intraabdominal collections.    Allergies: No Known Allergies    MEDICATIONS:  Neurologic Medications  acetaminophen Suspension .. 650 milliGRAM(s) Oral every 6 hours PRN Moderate Pain (4 - 6)  HYDROmorphone Injectable 0.5 milliGRAM(s) IV Push every 4 hours PRN Pain unrelieved by PO  oxyCODONE IR 5 milliGRAM(s) Oral every 4 hours PRN Moderate Pain (4 - 6)  oxyCODONE IR 10 milliGRAM(s) Oral every 4 hours PRN Severe Pain (7 - 10)  QUEtiapine 50 milliGRAM(s) Oral at bedtime  Respiratory Medications  Cardiovascular Medications  Gastrointestinal Medications  lactated ringers. 1000 milliLiter(s) IV Continuous <Continuous>  Genitourinary Medications  Hematologic/Oncologic Medications  enoxaparin Injectable 40 milliGRAM(s) SubCutaneous daily  Antimicrobial/Immunologic Medications  fluconAZOLE IVPB  meropenem IVPB 1000 milliGRAM(s) IV Intermittent every 8 hours  vancomycin IVPB  vancomycin IVPB 750 milliGRAM(s) IV Intermittent every 12 hours  Endocrine/Metabolic Medications  insulin lispro (HumaLOG) corrective regimen sliding scale SubCutaneous every 6 hours  Topical/Other Medications  artificial tears Solution 1 Drop(s) Both EYES every 12 hours  chlorhexidine 4% Liquid 1 Application(s) Topical <User Schedule>    VITAL SIGNS, INS/OUTS (last 24 hours):  T(C): 37.7 (02-03-19 @ 20:00), Max: 37.7 (02-03-19 @ 04:00)  HR: 96 (02-03-19 @ 23:18) (70 - 99)  BP: 135/51 (02-03-19 @ 05:00) (128/44 - 136/39)  BP(mean): 70 (02-03-19 @ 05:00) (60 - 70)  ABP: 102/40 (02-03-19 @ 23:00) (98/39 - 161/57)  ABP(mean): 60 (02-03-19 @ 23:00) (57 - 99)  RR: 21 (02-03-19 @ 23:00) (15 - 34)  SpO2: 97% (02-03-19 @ 23:18) (95% - 100%)  CI: 5.2 (02-03-19 @ 22:00) (2.8 - 5.8)  CAPILLARY BLOOD GLUCOSE  POCT Blood Glucose.: 127 mg/dL (03 Feb 2019 23:09)  POCT Blood Glucose.: 129 mg/dL (03 Feb 2019 17:44)  POCT Blood Glucose.: 195 mg/dL (03 Feb 2019 11:10)  POCT Blood Glucose.: 184 mg/dL (03 Feb 2019 05:48)  02-03 @ 07:01  -  02-04 @ 00:06  --------------------------------------------------------  IN:    IV PiggyBack: 350 mL    lactated ringers.: 750 mL    norepinephrine Infusion: 1.1 mL    norepinephrine Infusion: 17.7 mL    vasopressin Infusion: 9.6 mL  Total IN: 1128.4 mL    OUT:    Bulb: 85 mL    Bulb: 35 mL    Indwelling Catheter - Urethral: 725 mL    Rectal Tube: 200 mL  Total OUT: 1045 mL  Total NET: 83.4 mL      EXAM  NEUROLOGY   Exam: Normal, NAD, alert, oriented, no focal deficit, follows commands  HEENT  Exam: Normocephalic, atraumatic, EOMI.  RESPIRATORY  Exam: Lungs clear to auscultation, tachypnic    Mechanical Ventilation: Mode: avaps, RR (machine): 15, TV (machine): 400, FiO2: 60, PEEP: 12, PIP: 26  CARDIOVASCULAR  Exam: S1, S2. Regular rate and rhythm.  GI/NUTRITION  Exam: Abdomen soft, Non-tender, Non-distended.JOURDAN x2 bilious/purulent  VASCULAR  Exam: Extremities warm, pink, well-perfused.  MUSCULOSKELETAL  Exam: All extremities moving spontaneously without limitations.  SKIN  Exam: Good skin turgor, no skin breakdown.  METABOLIC/FLUIDS/ELECTROLYTES lactated ringers. 1000 milliLiter(s) IV Continuous <Continuous>  HEMATOLOGIC [x] VTE Prophylaxis: enoxaparin Injectable 40 milliGRAM(s) SubCutaneous daily  Transfusions: [] PRBC [] Platelets [] FFP [] Cryoprecipitate  INFECTIOUS DISEASE  Antimicrobials/Immunologic Medications:  fluconAZOLE IVPB  meropenem IVPB 1000 milliGRAM(s) IV Intermittent every 8 hours  vancomycin IVPB  vancomycin IVPB 750 milliGRAM(s) IV Intermittent every 12 hours       Tubes/Lines/Drains  [x] Peripheral IV  [] Central Venous Line [] R [] L [] IJ [] Fem [] SC Date Placed:  [] Arterial Line [] R [] L [] Fem [] Rad [] Ax Date Placed:  [] PICC [] Midline [] Mediport  [] Urinary Catheter Date Placed:  [x] Necessity of urinary, arterial, and venous catheters discussed    LABS  --------------------------------------------------------------------------------------    ((Insert SICU Labs here))***    --------------------------------------------------------------------------------------  CT:  IMPRESSION:  New diffuse peritoneal enhancement, compatible with peritonitis, more  extensive compared to 1/30/2019.  Multiple abdominal and pelvic fluid collections consistent with abscesses.  New small bowel wall may also edema and colonic wall thickening, likely  reactive.  Left upper quadrant collection measuring greater than fluid attenuation  is grossly unchanged from 1/30/2019.  Moderate left and small right pleural effusions with adjacent  atelectasis, slightly decreased from 1/30/2019.  Diffuse thickening of the colon consistent with colitis. SICU Morning Progress Note  Interval/Overnight Events: Patient on AVAPS overnight, she got 20 of IV Lasix for fluid overload, she became hypotensive and required levo, overnight she had an episode of vomiting and had an NG tube placed with minimal output and was given Zofran.     HPI: 54y Female PMHx HTN, T2DM, asthma, depression with recent diagnosis of invasive ampullary adenocarcinoma of the CBD admitted for cholangitis on 1/5, s/p ERCP 1/8, whipple 1/11, rapid response for GIB on floor 1/18 s/p MTP, RTOR s/p celiotomy, evacuation of hematoma, GDA bleed stopped and sutured, Abthera vac. Closure of abdomen with strattice mesh on 1/21, with inna drains and left lower JOURDAN drain in subcutaneous, s/p incisional bleed 1/23, bedside skin staples removed, JOURDAN drain removed, hemorrhage controlled, for continuation of ICU care for respiratory failure requiring intubation s/p extubation 1/29, underwent CT A/P 2/2 which showed large LUQ collection and multiple smaller intraabdominal collections.    Allergies: No Known Allergies    MEDICATIONS:  Neurologic Medications  acetaminophen Suspension .. 650 milliGRAM(s) Oral every 6 hours PRN Moderate Pain (4 - 6)  HYDROmorphone Injectable 0.5 milliGRAM(s) IV Push every 4 hours PRN Pain unrelieved by PO  oxyCODONE IR 5 milliGRAM(s) Oral every 4 hours PRN Moderate Pain (4 - 6)  oxyCODONE IR 10 milliGRAM(s) Oral every 4 hours PRN Severe Pain (7 - 10)  QUEtiapine 50 milliGRAM(s) Oral at bedtime  Respiratory Medications  Cardiovascular Medications  Gastrointestinal Medications  lactated ringers. 1000 milliLiter(s) IV Continuous <Continuous>  Genitourinary Medications  Hematologic/Oncologic Medications  enoxaparin Injectable 40 milliGRAM(s) SubCutaneous daily  Antimicrobial/Immunologic Medications  fluconAZOLE IVPB  meropenem IVPB 1000 milliGRAM(s) IV Intermittent every 8 hours  vancomycin IVPB  vancomycin IVPB 750 milliGRAM(s) IV Intermittent every 12 hours  Endocrine/Metabolic Medications  insulin lispro (HumaLOG) corrective regimen sliding scale SubCutaneous every 6 hours  Topical/Other Medications  artificial tears Solution 1 Drop(s) Both EYES every 12 hours  chlorhexidine 4% Liquid 1 Application(s) Topical <User Schedule>    VITAL SIGNS, INS/OUTS (last 24 hours):  T(C): 37.7 (02-03-19 @ 20:00), Max: 37.7 (02-03-19 @ 04:00)  HR: 96 (02-03-19 @ 23:18) (70 - 99)  BP: 135/51 (02-03-19 @ 05:00) (128/44 - 136/39)  BP(mean): 70 (02-03-19 @ 05:00) (60 - 70)  ABP: 102/40 (02-03-19 @ 23:00) (98/39 - 161/57)  ABP(mean): 60 (02-03-19 @ 23:00) (57 - 99)  RR: 21 (02-03-19 @ 23:00) (15 - 34)  SpO2: 97% (02-03-19 @ 23:18) (95% - 100%)  CI: 5.2 (02-03-19 @ 22:00) (2.8 - 5.8)  CAPILLARY BLOOD GLUCOSE  POCT Blood Glucose.: 127 mg/dL (03 Feb 2019 23:09)  POCT Blood Glucose.: 129 mg/dL (03 Feb 2019 17:44)  POCT Blood Glucose.: 195 mg/dL (03 Feb 2019 11:10)  POCT Blood Glucose.: 184 mg/dL (03 Feb 2019 05:48)  02-03 @ 07:01  -  02-04 @ 00:06  --------------------------------------------------------  IN:    IV PiggyBack: 350 mL    lactated ringers.: 750 mL    norepinephrine Infusion: 1.1 mL    norepinephrine Infusion: 17.7 mL    vasopressin Infusion: 9.6 mL  Total IN: 1128.4 mL    OUT:    Bulb: 85 mL    Bulb: 35 mL    Indwelling Catheter - Urethral: 725 mL    Rectal Tube: 200 mL  Total OUT: 1045 mL  Total NET: 83.4 mL      EXAM  NEUROLOGY   Exam: Normal, NAD, alert, oriented, no focal deficit, follows commands  HEENT  Exam: Normocephalic, atraumatic, EOMI.  RESPIRATORY  Exam: Lungs clear to auscultation, tachypnic    Mechanical Ventilation: Mode: avaps, RR (machine): 15, TV (machine): 400, FiO2: 60, PEEP: 12, PIP: 26  CARDIOVASCULAR  Exam: S1, S2. Regular rate and rhythm.  GI/NUTRITION  Exam: Abdomen soft, Non-tender, Non-distended.JOURDAN x2 bilious/purulent  VASCULAR  Exam: Extremities warm, pink, well-perfused.  MUSCULOSKELETAL  Exam: All extremities moving spontaneously without limitations.  SKIN  Exam: Good skin turgor, no skin breakdown.  METABOLIC/FLUIDS/ELECTROLYTES lactated ringers. 1000 milliLiter(s) IV Continuous <Continuous>  HEMATOLOGIC [x] VTE Prophylaxis: enoxaparin Injectable 40 milliGRAM(s) SubCutaneous daily  Transfusions: [] PRBC [] Platelets [] FFP [] Cryoprecipitate  INFECTIOUS DISEASE  Antimicrobials/Immunologic Medications:  fluconAZOLE IVPB  meropenem IVPB 1000 milliGRAM(s) IV Intermittent every 8 hours  vancomycin IVPB  vancomycin IVPB 750 milliGRAM(s) IV Intermittent every 12 hours       Tubes/Lines/Drains  [x] Peripheral IV  [] Central Venous Line [] R [] L [] IJ [] Fem [] SC Date Placed:  [] Arterial Line [] R [] L [] Fem [] Rad [] Ax Date Placed:  [] PICC [] Midline [] Mediport  [] Urinary Catheter Date Placed:  [x] Necessity of urinary, arterial, and venous catheters discussed    LABS  --------------------------------------------------------------------------------------    ((Insert SICU Labs here))***    --------------------------------------------------------------------------------------  CT:  IMPRESSION:  New diffuse peritoneal enhancement, compatible with peritonitis, more  extensive compared to 1/30/2019.  Multiple abdominal and pelvic fluid collections consistent with abscesses.  New small bowel wall may also edema and colonic wall thickening, likely  reactive.  Left upper quadrant collection measuring greater than fluid attenuation  is grossly unchanged from 1/30/2019.  Moderate left and small right pleural effusions with adjacent  atelectasis, slightly decreased from 1/30/2019.  Diffuse thickening of the colon consistent with colitis.

## 2019-02-04 NOTE — PROGRESS NOTE ADULT - ASSESSMENT
54y Female PMHx HTN, T2DM, asthma, depression with recent diagnosis of invasive ampullary adenocarcinoma of the CBD admitted for cholangitis on 1/5, s/p ERCP 1/8, whipple 1/11, rapid response for GIB on floor 1/18 s/p MTP, RTOR s/p celiotomy, evacuation of hematoma, GDA bleed stopped and sutured, Abthera vac. Closure of abdomen with strattice mesh on 1/21, with inna drains and left lower JOURDAN drain in subcutaneous. Incisional bleed 1/23, bedside skin staples removed, JOURDAN drain removed, hemorrhage controlled, respiratory failure s/p extubation 1/29, underwent CT A/P 2/2 which showed large LUQ collection and multiple smaller intraabdominal collections.    Neurology:  -Tylenol & Oxy and Dilaudid PRN for pain  -Seroquel 50 for slee     Resp:  - On AVAP  - f/u repeat ABG    CV:  -currently stable  -goal MAP > 65    GI:  - NPO / NGT for vomiting 2/3, Tigertube  - Repeated CT scan with multiple collections going to IR today for drainage  - Bowel regimen  - Increased secretions in drains green/brown in color    Renal:  - sinha in, strict I&O, monitor lytes, replete as needed  - IVF on hold, will resuscitate as needed    Heme:  -monitor H/H  -ct lovenox vte ppx    ID:  - Febrile, uptrending leukocytosis on vanco (2/3), Diflucan (2/3), and meropenem (2/2)  - Vanco trough due at 5PM  - U/A neg, blood cx pending for last PAN cx done  - c-diff negative  - continue to trend WBC      Access: Right AC 18G extended dwell peripheral IV, Left forearm 20G PIV, right femoral triple lumen, right radial A line    Dispo: SICU     CC Diagnosis: Septic shock, respiratory abnormality, malnutrition, peritonitis, abdominal abscess 54y Female PMHx HTN, T2DM, asthma, depression with recent diagnosis of invasive ampullary adenocarcinoma of the CBD admitted for cholangitis on 1/5, s/p ERCP 1/8, whipple 1/11, rapid response for GIB on floor 1/18 s/p MTP, RTOR s/p celiotomy, evacuation of hematoma, GDA bleed stopped and sutured, Abthera vac. Closure of abdomen with strattice mesh on 1/21, with inna drains and left lower JOURDAN drain in subcutaneous. Incisional bleed 1/23, bedside skin staples removed, JOURDAN drain removed, hemorrhage controlled, respiratory failure s/p extubation 1/29, underwent CT A/P 2/2 which showed large LUQ collection and multiple smaller intraabdominal collections.    Neurology:  -Tylenol & Oxy and Dilaudid PRN for pain  -Seroquel 50 for slee     Resp:  - On AVAP  - f/u repeat ABG  - Diuresis    CV:  -wean levo as tolerated  -goal MAP > 65    GI:  - NPO / NGT for vomiting 2/3, Tigertube  - Repeated CT scan with multiple collections going to IR today for drainage  - Bowel regimen  - Increased secretions in drains green/brown in color    Renal:  - sinha in, strict I&O, monitor lytes, replete as needed  - IVF on hold, will resuscitate as needed    Heme:  -monitor H/H  -ct lovenox vte ppx    ID:  - Febrile, uptrending leukocytosis on vanco (2/3), Diflucan (2/3), and meropenem (2/2)  - Vanco trough due at 5PM  - U/A neg, blood cx pending for last PAN cx done  - c-diff negative  - continue to trend WBC      Access: Right AC 18G extended dwell peripheral IV, Left forearm 20G PIV, right femoral triple lumen, right radial A line    Dispo: SICU     CC Diagnosis: Septic shock, respiratory abnormality, malnutrition, peritonitis, abdominal abscess 54y Female PMHx HTN, T2DM, asthma, depression with recent diagnosis of invasive ampullary adenocarcinoma of the CBD admitted for cholangitis on 1/5, s/p ERCP 1/8, whipple 1/11, rapid response for GIB on floor 1/18 s/p MTP, RTOR s/p celiotomy, evacuation of hematoma, GDA bleed stopped and sutured, Abthera vac. Closure of abdomen with strattice mesh on 1/21, with inna drains and left lower JOURDAN drain in subcutaneous. Incisional bleed 1/23, bedside skin staples removed, JOURDAN drain removed, hemorrhage controlled, respiratory failure s/p extubation 1/29, underwent CT A/P 2/2 which showed large LUQ collection and multiple smaller intraabdominal collections.    Neurology:  -Tylenol & Oxy and Dilaudid PRN for pain  -Seroquel 50 for slee     Resp:  - On AVAP  - f/u repeat ABG  - Diuresis    CV:  -wean levo as tolerated  -goal MAP > 65    GI:  - NPO / NGT for vomiting 2/3, Tigertube  - Repeated CT scan with multiple collections going to IR today for drainage  - Bowel regimen  - Increased secretions in drains green/brown in color, monitor, may need octreotide  - start protonixn     Renal:  - sinha in, strict I&O, monitor lytes, replete as needed  - IVF on hold, will resuscitate as needed    Heme:  -monitor H/H, tranfuse if <7   -ct lovenox vte ppx  - VIT k for INR     ID:  - Febrile, uptrending leukocytosis on vanco (2/3), Diflucan (2/3), and meropenem (2/2)  - Vanco trough due at 5PM  - U/A neg, blood cx pending for last PAN cx done  - c-diff negative  - continue to trend WBC    Endo  -     Access: Right AC 18G extended dwell peripheral IV, Left forearm 20G PIV, right femoral triple lumen, right radial A line    Dispo: SICU     CC Diagnosis: Septic shock, respiratory abnormality, malnutrition, peritonitis, abdominal abscess

## 2019-02-04 NOTE — PROGRESS NOTE ADULT - ATTENDING COMMENTS
Seen and examined, chart and note reviewed, case discussed with SICU team    Severe sepsis secondary to bile leak/intra-abdominal abscess  a.  Off vasopressor support this am  b.  For IR drainage of intra-abdominal collection  c.  Continue IV meropenem, vancomycin and diflucan  d.  Low output leak,may  consider octreotide    Fluid overload  a.  Received 20mg lasix overnight    At risk for malnutrition  a.  NPO at this time  b.  Start protonix for SUP secondary to coagulopathy    Spent 30 minutes in critical care

## 2019-02-04 NOTE — PROGRESS NOTE ADULT - ASSESSMENT
54y Female PMHx HTN, T2DM, asthma, depression with recent diagnosis of invasive ampullary adenocarcinoma of the CBD admitted for cholangitis on 1/5, s/p ERCP 1/8, whipple 1/11, rapid response for GIB on floor 1/18 s/p MTP, RTOR s/p celiotomy, evacuation of hematoma, GDA bleed stopped and sutured, Abthera vac. Closure of abdomen with strattice mesh on 1/21, with inna drains and left lower JOURDAN drain in subcutaneous. Incisional bleed 1/23, bedside skin staples removed, JOURDAN drain removed, hemorrhage controlled, for continuation of ICU care.     PLAN:  -cont care per SICU  -pain control as needed  -cont lovenox for DVT ppx  -PT/oob  -cont NPO/IVF/NGT, consider discontinuing NGT if decreased output  -monitor VS  -f/u IR for drainage of LUQ collection, PPC  -c/w broad spectrum abx  -recommend placement of Cuervo Tube for enteral feeding

## 2019-02-04 NOTE — PROGRESS NOTE ADULT - SUBJECTIVE AND OBJECTIVE BOX
Plastic Surgery Progress Note     S: Patient seen and examined today in SICU     MEDICATIONS  (STANDING):  artificial  tears Solution 1 Drop(s) Both EYES every 12 hours  chlorhexidine 4% Liquid 1 Application(s) Topical <User Schedule>  enoxaparin Injectable 40 milliGRAM(s) SubCutaneous daily  fluconAZOLE IVPB      fluconAZOLE IVPB 200 milliGRAM(s) IV Intermittent every 24 hours  insulin lispro (HumaLOG) corrective regimen sliding scale   SubCutaneous every 6 hours  lactated ringers. 1000 milliLiter(s) (50 mL/Hr) IV Continuous <Continuous>  meropenem  IVPB 1000 milliGRAM(s) IV Intermittent every 8 hours  norepinephrine Infusion 0.02 MICROgram(s)/kG/Min (1.076 mL/Hr) IV Continuous <Continuous>  phytonadione  IVPB 5 milliGRAM(s) IV Intermittent once  QUEtiapine 50 milliGRAM(s) Oral at bedtime  vancomycin  IVPB      vancomycin  IVPB 750 milliGRAM(s) IV Intermittent every 12 hours    MEDICATIONS  (PRN):  HYDROmorphone  Injectable 0.5 milliGRAM(s) IV Push every 4 hours PRN Pain unrelieved by PO  oxyCODONE    IR 5 milliGRAM(s) Oral every 4 hours PRN Moderate Pain (4 - 6)  oxyCODONE    IR 10 milliGRAM(s) Oral every 4 hours PRN Severe Pain (7 - 10)      Physical Exam:    Vital Signs Last 24 Hrs  T(C): 37.5 (04 Feb 2019 04:00), Max: 38 (04 Feb 2019 00:00)  T(F): 99.5 (04 Feb 2019 04:00), Max: 100.4 (04 Feb 2019 00:00)  HR: 88 (04 Feb 2019 06:00) (70 - 99)  BP: 108/42 (04 Feb 2019 01:00) (108/42 - 108/42)  BP(mean): 58 (04 Feb 2019 01:00) (58 - 58)  RR: 20 (04 Feb 2019 06:00) (14 - 34)  SpO2: 100% (04 Feb 2019 06:00) (95% - 100%)    02-03-19 @ 07:01  -  02-04-19 @ 07:00  --------------------------------------------------------  IN: 2087.3 mL / OUT: 1835 mL / NET: 252.3 mL        -- CONSTITUTIONAL: Alert, NAD   -- PULM: non-labored respirations  -- ABDOMEN: soft, midline abdominal wound with external tissue expander in place, ~3cm wide defect, packing with greenish discharge that is malodorous, packing in place            LABS:                        9.1    16.64 )-----------( 301      ( 04 Feb 2019 02:30 )             26.4     02-04    135  |  100  |  34<H>  ----------------------------<  116<H>  3.0<L>   |  24  |  0.80    Ca    7.9<L>      04 Feb 2019 02:30  Phos  2.7     02-04  Mg     2.1     02-04    TPro  5.2<L>  /  Alb  2.0<L>  /  TBili  5.0<H>  /  DBili  x   /  AST  50<H>  /  ALT  24  /  AlkPhos  319<H>  02-04

## 2019-02-04 NOTE — PROGRESS NOTE ADULT - ASSESSMENT
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  - External tissue expander device to remain in place until sufficient skin laxity to allow for skin closure. Do not remove or otherwise touch this device. It will automatically tighten on its own without external intervention. It will also maintain appropriate tension on the wound over time.   - Nutritional optimization.  - Medical optimization.  - Continue BID packing to the abdominal wound (around the external tissue expander device) with normal-saline-moistened Kerlix gauze.  - Care per primary    Plastic Surgery (klaudia DICKENS: [id number], NS: [phone])

## 2019-02-04 NOTE — CHART NOTE - NSCHARTNOTEFT_GEN_A_CORE
PRE-INTERVENTIONAL RADIOLOGY PROCEDURE NOTE      Patient Age: 54    Patient Gender: Female    Procedure: IR drainage of abscess    Diagnosis/Indication: IR drainage of abscess    Interventional Radiology Attending Physician: Dr. Horton    Ordering Attending Physician: Dr. Bourne    Pertinent Medical History: s/p whipple with RTOR for bleeding and abdominal compartment syndrome     Pertinent labs:                      9.1    16.64 )-----------( 301      ( 04 Feb 2019 02:30 )             26.4       02-04    136  |  101  |  29<H>  ----------------------------<  138<H>  4.1   |  25  |  0.72    Ca    7.9<L>      04 Feb 2019 11:00  Phos  2.3     02-04  Mg     1.9     02-04    TPro  5.2<L>  /  Alb  2.0<L>  /  TBili  5.0<H>  /  DBili  x   /  AST  50<H>  /  ALT  24  /  AlkPhos  319<H>  02-04      PT/INR - ( 04 Feb 2019 11:00 )   PT: 18.5 SEC;   INR: 1.64          PTT - ( 04 Feb 2019 11:00 )  PTT:29.1 SEC        Patient and Family Aware ? Yes    Zayra Rodriguez, PAC  Contact # [phone]

## 2019-02-04 NOTE — PROGRESS NOTE ADULT - SUBJECTIVE AND OBJECTIVE BOX
General Surgery Progress Note    SUBJECTIVE:  The patient was seen and examined. No acute events overnight. Pain well controlled. NGT placed yesterday 2/2 emesis.    OBJECTIVE:     ** VITAL SIGNS / I&O's **    Vital Signs Last 24 Hrs  T(C): 37.5 (04 Feb 2019 04:00), Max: 38 (04 Feb 2019 00:00)  T(F): 99.5 (04 Feb 2019 04:00), Max: 100.4 (04 Feb 2019 00:00)  HR: 88 (04 Feb 2019 06:00) (70 - 99)  BP: 108/42 (04 Feb 2019 01:00) (108/42 - 108/42)  BP(mean): 58 (04 Feb 2019 01:00) (58 - 58)  RR: 20 (04 Feb 2019 06:00) (14 - 34)  SpO2: 100% (04 Feb 2019 06:00) (95% - 100%)  Mode: standby      03 Feb 2019 07:01  -  04 Feb 2019 07:00  --------------------------------------------------------  IN:    IV PiggyBack: 1000 mL    lactated ringers.: 1050 mL    norepinephrine Infusion: 10 mL    norepinephrine Infusion: 17.7 mL    vasopressin Infusion: 9.6 mL  Total IN: 2087.3 mL    OUT:    Bulb: 115 mL    Bulb: 45 mL    Indwelling Catheter - Urethral: 975 mL    Nasoenteral Tube: 500 mL    Rectal Tube: 200 mL  Total OUT: 1835 mL    Total NET: 252.3 mL          ** PHYSICAL EXAM **    -- CONSTITUTIONAL: NAD, NGT in place to suction w/ bilious output  -- PULMONARY: non-labored respirations  -- ABDOMEN: soft, NTND, midline incision re-packed, RUQ and LLQ JOURDAN bilious, no rebound or guarding    ** LABS **                          9.1    16.64 )-----------( 301      ( 04 Feb 2019 02:30 )             26.4     04 Feb 2019 02:30    135    |  100    |  34     ----------------------------<  116    3.0     |  24     |  0.80     Ca    7.9        04 Feb 2019 02:30  Phos  2.7       04 Feb 2019 02:30  Mg     2.1       04 Feb 2019 02:30    TPro  5.2    /  Alb  2.0    /  TBili  5.0    /  DBili  x      /  AST  50     /  ALT  24     /  AlkPhos  319    04 Feb 2019 02:30    PT/INR - ( 04 Feb 2019 02:30 )   PT: 19.6 SEC;   INR: 1.69          PTT - ( 04 Feb 2019 02:30 )  PTT:30.7 SEC  CAPILLARY BLOOD GLUCOSE      POCT Blood Glucose.: 112 mg/dL (04 Feb 2019 05:55)  POCT Blood Glucose.: 127 mg/dL (03 Feb 2019 23:09)  POCT Blood Glucose.: 129 mg/dL (03 Feb 2019 17:44)  POCT Blood Glucose.: 195 mg/dL (03 Feb 2019 11:10)        LIVER FUNCTIONS - ( 04 Feb 2019 02:30 )  Alb: 2.0 g/dL / Pro: 5.2 g/dL / ALK PHOS: 319 u/L / ALT: 24 u/L / AST: 50 u/L / GGT: x             Culture - Blood (collected 01 Feb 2019 12:17)  Source: BLOOD PERIPHERAL  Preliminary Report (03 Feb 2019 12:15):    NO ORGANISMS ISOLATED    NO ORGANISMS ISOLATED AT 48 HRS.    Culture - Blood (collected 01 Feb 2019 12:17)  Source: BLOOD PERIPHERAL  Preliminary Report (03 Feb 2019 12:15):    NO ORGANISMS ISOLATED    NO ORGANISMS ISOLATED AT 48 HRS.          MEDICATIONS  (STANDING):  artificial  tears Solution 1 Drop(s) Both EYES every 12 hours  chlorhexidine 4% Liquid 1 Application(s) Topical <User Schedule>  enoxaparin Injectable 40 milliGRAM(s) SubCutaneous daily  fluconAZOLE IVPB      fluconAZOLE IVPB 200 milliGRAM(s) IV Intermittent every 24 hours  insulin lispro (HumaLOG) corrective regimen sliding scale   SubCutaneous every 6 hours  lactated ringers. 1000 milliLiter(s) (50 mL/Hr) IV Continuous <Continuous>  meropenem  IVPB 1000 milliGRAM(s) IV Intermittent every 8 hours  norepinephrine Infusion 0.02 MICROgram(s)/kG/Min (1.076 mL/Hr) IV Continuous <Continuous>  phytonadione  IVPB 5 milliGRAM(s) IV Intermittent once  QUEtiapine 50 milliGRAM(s) Oral at bedtime  vancomycin  IVPB      vancomycin  IVPB 750 milliGRAM(s) IV Intermittent every 12 hours    MEDICATIONS  (PRN):  HYDROmorphone  Injectable 0.5 milliGRAM(s) IV Push every 4 hours PRN Pain unrelieved by PO  oxyCODONE    IR 5 milliGRAM(s) Oral every 4 hours PRN Moderate Pain (4 - 6)  oxyCODONE    IR 10 milliGRAM(s) Oral every 4 hours PRN Severe Pain (7 - 10)

## 2019-02-05 LAB
ALBUMIN SERPL ELPH-MCNC: 2.1 G/DL — LOW (ref 3.3–5)
ALP SERPL-CCNC: 322 U/L — HIGH (ref 40–120)
ALT FLD-CCNC: 23 U/L — SIGNIFICANT CHANGE UP (ref 4–33)
ANION GAP SERPL CALC-SCNC: 10 MMO/L — SIGNIFICANT CHANGE UP (ref 7–14)
AST SERPL-CCNC: 54 U/L — HIGH (ref 4–32)
BASE EXCESS BLDA CALC-SCNC: 2.3 MMOL/L — SIGNIFICANT CHANGE UP
BILIRUB DIRECT SERPL-MCNC: 3.5 MG/DL — HIGH (ref 0.1–0.2)
BILIRUB SERPL-MCNC: 4.3 MG/DL — HIGH (ref 0.2–1.2)
BUN SERPL-MCNC: 23 MG/DL — SIGNIFICANT CHANGE UP (ref 7–23)
CA-I BLD-SCNC: 1.12 MMOL/L — SIGNIFICANT CHANGE UP (ref 1.03–1.23)
CA-I BLDA-SCNC: 1.17 MMOL/L — SIGNIFICANT CHANGE UP (ref 1.15–1.29)
CALCIUM SERPL-MCNC: 7.9 MG/DL — LOW (ref 8.4–10.5)
CHLORIDE SERPL-SCNC: 102 MMOL/L — SIGNIFICANT CHANGE UP (ref 98–107)
CO2 SERPL-SCNC: 25 MMOL/L — SIGNIFICANT CHANGE UP (ref 22–31)
CREAT SERPL-MCNC: 0.66 MG/DL — SIGNIFICANT CHANGE UP (ref 0.5–1.3)
CULTURE - ACID FAST SMEAR CONCENTRATED: SIGNIFICANT CHANGE UP
GLUCOSE BLDA-MCNC: 138 MG/DL — HIGH (ref 70–99)
GLUCOSE SERPL-MCNC: 143 MG/DL — HIGH (ref 70–99)
HCO3 BLDA-SCNC: 26 MMOL/L — SIGNIFICANT CHANGE UP (ref 22–26)
HCT VFR BLD CALC: 26.9 % — LOW (ref 34.5–45)
HCT VFR BLDA CALC: 28.1 % — LOW (ref 34.5–46.5)
HGB BLD-MCNC: 9 G/DL — LOW (ref 11.5–15.5)
HGB BLDA-MCNC: 9.1 G/DL — LOW (ref 11.5–15.5)
LACTATE BLDA-SCNC: 1.8 MMOL/L — SIGNIFICANT CHANGE UP (ref 0.5–2)
MAGNESIUM SERPL-MCNC: 2 MG/DL — SIGNIFICANT CHANGE UP (ref 1.6–2.6)
MCHC RBC-ENTMCNC: 28.8 PG — SIGNIFICANT CHANGE UP (ref 27–34)
MCHC RBC-ENTMCNC: 33.5 % — SIGNIFICANT CHANGE UP (ref 32–36)
MCV RBC AUTO: 86.2 FL — SIGNIFICANT CHANGE UP (ref 80–100)
NRBC # FLD: 0.03 K/UL — LOW (ref 25–125)
PCO2 BLDA: 40 MMHG — SIGNIFICANT CHANGE UP (ref 32–48)
PH BLDA: 7.43 PH — SIGNIFICANT CHANGE UP (ref 7.35–7.45)
PHOSPHATE SERPL-MCNC: 2.8 MG/DL — SIGNIFICANT CHANGE UP (ref 2.5–4.5)
PLATELET # BLD AUTO: 264 K/UL — SIGNIFICANT CHANGE UP (ref 150–400)
PMV BLD: 10.1 FL — SIGNIFICANT CHANGE UP (ref 7–13)
PO2 BLDA: 81 MMHG — LOW (ref 83–108)
POTASSIUM BLDA-SCNC: 3.6 MMOL/L — SIGNIFICANT CHANGE UP (ref 3.4–4.5)
POTASSIUM SERPL-MCNC: 3.9 MMOL/L — SIGNIFICANT CHANGE UP (ref 3.5–5.3)
POTASSIUM SERPL-SCNC: 3.9 MMOL/L — SIGNIFICANT CHANGE UP (ref 3.5–5.3)
PROT SERPL-MCNC: 5.2 G/DL — LOW (ref 6–8.3)
RBC # BLD: 3.12 M/UL — LOW (ref 3.8–5.2)
RBC # FLD: 17.4 % — HIGH (ref 10.3–14.5)
SAO2 % BLDA: 96.5 % — SIGNIFICANT CHANGE UP (ref 95–99)
SODIUM BLDA-SCNC: 132 MMOL/L — LOW (ref 136–146)
SODIUM SERPL-SCNC: 137 MMOL/L — SIGNIFICANT CHANGE UP (ref 135–145)
SPECIMEN SOURCE: SIGNIFICANT CHANGE UP
VANCOMYCIN TROUGH SERPL-MCNC: 15 UG/ML — SIGNIFICANT CHANGE UP (ref 10–20)
WBC # BLD: 18.49 K/UL — HIGH (ref 3.8–10.5)
WBC # FLD AUTO: 18.49 K/UL — HIGH (ref 3.8–10.5)

## 2019-02-05 PROCEDURE — 99291 CRITICAL CARE FIRST HOUR: CPT

## 2019-02-05 PROCEDURE — 71045 X-RAY EXAM CHEST 1 VIEW: CPT | Mod: 26

## 2019-02-05 RX ORDER — MICAFUNGIN SODIUM 100 MG/1
INJECTION, POWDER, LYOPHILIZED, FOR SOLUTION INTRAVENOUS
Qty: 0 | Refills: 0 | Status: DISCONTINUED | OUTPATIENT
Start: 2019-02-05 | End: 2019-02-15

## 2019-02-05 RX ORDER — MICAFUNGIN SODIUM 100 MG/1
150 INJECTION, POWDER, LYOPHILIZED, FOR SOLUTION INTRAVENOUS ONCE
Qty: 0 | Refills: 0 | Status: COMPLETED | OUTPATIENT
Start: 2019-02-05 | End: 2019-02-05

## 2019-02-05 RX ORDER — ACETAMINOPHEN 500 MG
1000 TABLET ORAL ONCE
Qty: 0 | Refills: 0 | Status: COMPLETED | OUTPATIENT
Start: 2019-02-05 | End: 2019-02-05

## 2019-02-05 RX ORDER — QUETIAPINE FUMARATE 200 MG/1
25 TABLET, FILM COATED ORAL AT BEDTIME
Qty: 0 | Refills: 0 | Status: DISCONTINUED | OUTPATIENT
Start: 2019-02-05 | End: 2019-02-06

## 2019-02-05 RX ORDER — MICAFUNGIN SODIUM 100 MG/1
150 INJECTION, POWDER, LYOPHILIZED, FOR SOLUTION INTRAVENOUS EVERY 24 HOURS
Qty: 0 | Refills: 0 | Status: DISCONTINUED | OUTPATIENT
Start: 2019-02-06 | End: 2019-02-15

## 2019-02-05 RX ADMIN — MICAFUNGIN SODIUM 107.5 MILLIGRAM(S): 100 INJECTION, POWDER, LYOPHILIZED, FOR SOLUTION INTRAVENOUS at 12:58

## 2019-02-05 RX ADMIN — Medication 250 MILLIGRAM(S): at 06:16

## 2019-02-05 RX ADMIN — Medication 1000 MILLIGRAM(S): at 12:15

## 2019-02-05 RX ADMIN — ENOXAPARIN SODIUM 40 MILLIGRAM(S): 100 INJECTION SUBCUTANEOUS at 12:58

## 2019-02-05 RX ADMIN — Medication 400 MILLIGRAM(S): at 12:00

## 2019-02-05 RX ADMIN — MEROPENEM 100 MILLIGRAM(S): 1 INJECTION INTRAVENOUS at 22:47

## 2019-02-05 RX ADMIN — MEROPENEM 100 MILLIGRAM(S): 1 INJECTION INTRAVENOUS at 14:07

## 2019-02-05 RX ADMIN — HYDROMORPHONE HYDROCHLORIDE 0.5 MILLIGRAM(S): 2 INJECTION INTRAMUSCULAR; INTRAVENOUS; SUBCUTANEOUS at 20:35

## 2019-02-05 RX ADMIN — MEROPENEM 100 MILLIGRAM(S): 1 INJECTION INTRAVENOUS at 06:16

## 2019-02-05 RX ADMIN — Medication 400 MILLIGRAM(S): at 22:47

## 2019-02-05 RX ADMIN — HYDROMORPHONE HYDROCHLORIDE 0.5 MILLIGRAM(S): 2 INJECTION INTRAMUSCULAR; INTRAVENOUS; SUBCUTANEOUS at 21:05

## 2019-02-05 RX ADMIN — Medication 1000 MILLIGRAM(S): at 23:45

## 2019-02-05 RX ADMIN — FLUCONAZOLE 100 MILLIGRAM(S): 150 TABLET ORAL at 11:00

## 2019-02-05 RX ADMIN — Medication 250 MILLIGRAM(S): at 18:53

## 2019-02-05 RX ADMIN — Medication 1 DROP(S): at 06:17

## 2019-02-05 RX ADMIN — HYDROMORPHONE HYDROCHLORIDE 0.5 MILLIGRAM(S): 2 INJECTION INTRAMUSCULAR; INTRAVENOUS; SUBCUTANEOUS at 06:30

## 2019-02-05 RX ADMIN — PANTOPRAZOLE SODIUM 40 MILLIGRAM(S): 20 TABLET, DELAYED RELEASE ORAL at 12:58

## 2019-02-05 RX ADMIN — HYDROMORPHONE HYDROCHLORIDE 0.5 MILLIGRAM(S): 2 INJECTION INTRAMUSCULAR; INTRAVENOUS; SUBCUTANEOUS at 06:16

## 2019-02-05 RX ADMIN — QUETIAPINE FUMARATE 25 MILLIGRAM(S): 200 TABLET, FILM COATED ORAL at 22:47

## 2019-02-05 RX ADMIN — Medication 1 DROP(S): at 18:24

## 2019-02-05 NOTE — PROGRESS NOTE ADULT - SUBJECTIVE AND OBJECTIVE BOX
SICU Morning Progress Note   ?  Interval/Overnight Events: Patient went to IR yesterday evening and had her LUQ collection drained, a drain was left in place. Patient continues to be febrile blood cultures were sent. Patient off of vasopressors x 24 hours  ?  HPI: 54y Female PMHx HTN, T2DM, asthma, depression with recent diagnosis of invasive ampullary adenocarcinoma of the CBD admitted for cholangitis on 1/5, s/p ERCP 1/8, whipple 1/11, rapid response for GIB on floor 1/18 s/p MTP, RTOR s/p celiotomy, evacuation of hematoma, GDA bleed stopped and sutured, Abthera vac. Closure of abdomen with strattice mesh on 1/21, with inna drains and left lower JOURDAN drain in subcutaneous, s/p incisional bleed 1/23, bedside skin staples removed, JOURDAN drain removed, hemorrhage controlled, for continuation of ICU care for respiratory failure requiring intubation s/p extubation 1/29, underwent CT A/P 2/2 which showed large LUQ collection and multiple smaller intraabdominal collection s/p IR drainage 2/4  ?  Allergies: No Known Allergies  ?  MEDICATIONS:   Neurologic Medications  acetaminophen Suspension .. 650 milliGRAM(s) Oral every 6 hours PRN Moderate Pain (4 - 6)  HYDROmorphone Injectable 0.5 milliGRAM(s) IV Push every 4 hours PRN Pain unrelieved by PO  oxyCODONE IR 5 milliGRAM(s) Oral every 4 hours PRN Moderate Pain (4 - 6)  oxyCODONE IR 10 milliGRAM(s) Oral every 4 hours PRN Severe Pain (7 - 10)  QUEtiapine 50 milliGRAM(s) Oral at bedtime  Respiratory Medications  Cardiovascular Medications  Gastrointestinal Medications  lactated ringers. 1000 milliLiter(s) IV Continuous <Continuous>  Genitourinary Medications  Hematologic/Oncologic Medications  enoxaparin Injectable 40 milliGRAM(s) SubCutaneous daily  Antimicrobial/Immunologic Medications  fluconAZOLE IVPB   meropenem IVPB 1000 milliGRAM(s) IV Intermittent every 8 hours  vancomycin IVPB   vancomycin IVPB 750 milliGRAM(s) IV Intermittent every 12 hours  Endocrine/Metabolic Medications  insulin lispro (HumaLOG) corrective regimen sliding scale SubCutaneous every 6 hours  Topical/Other Medications  artificial tears Solution 1 Drop(s) Both EYES every 12 hours  chlorhexidine 4% Liquid 1 Application(s) Topical <User Schedule>  ?  VITAL SIGNS, INS/OUTS (last 24 hours):  T(C): 38.2 (02-04-19 @ 20:00), Max: 38.6 (02-04-19 @ 16:00)  HR: 84 (02-04-19 @ 23:00) (81 - 108)  BP: 108/42 (02-04-19 @ 01:00) (108/42 - 108/42)  BP(mean): 58 (02-04-19 @ 01:00) (58 - 58)  ABP: 104/47 (02-04-19 @ 23:00) (93/42 - 145/64)  ABP(mean): 69 (02-04-19 @ 23:00) (60 - 92)  RR: 11 (02-04-19 @ 23:00) (11 - 28)  SpO2: 98% (02-04-19 @ 23:00) (92% - 100%)  CI: 5 (02-04-19 @ 06:00) (5 - 5.4)  CAPILLARY BLOOD GLUCOSE  POCT Blood Glucose.: 132 mg/dL (04 Feb 2019 23:13)  POCT Blood Glucose.: 90 mg/dL (04 Feb 2019 18:34)  POCT Blood Glucose.: 134 mg/dL (04 Feb 2019 12:03)  POCT Blood Glucose.: 112 mg/dL (04 Feb 2019 05:55)    02-04 @ 07:01  -  02-05 @ 00:05  --------------------------------------------------------  IN:    dextrose 5% + sodium chloride 0.45% with potassium chloride 20 mEq/L: 275 mL    IV PiggyBack: 550 mL    lactated ringers.: 450 mL    norepinephrine Infusion: 7.5 mL  Total IN: 1282.5 mL    OUT:    Bulb: 90 mL    Bulb: 220 mL    Indwelling Catheter - Urethral: 440 mL  Total OUT: 750 mL  Total NET: 532.5 mL    EXAM  NEUROLOGY   Exam: Normal, NAD, alert, oriented, no focal deficit, follows commands  HEENT  Exam: Normocephalic, atraumatic, EOMI.   RESPIRATORY  Exam: Lungs clear to auscultation, tachypnic   Mechanical Ventilation: Mode: avaps, RR (machine): 15, TV (machine): 400, FiO2: 60, PEEP: 12, PIP: 26  CARDIOVASCULAR  Exam: S1, S2. Regular rate and rhythm.   GI/NUTRITION  Exam: Abdomen soft, Non-tender, Non-distended.JOURDAN bilious, IR drainage bag serosang  VASCULAR  Exam: Extremities warm, pink, well-perfused.   MUSCULOSKELETAL  Exam: All extremities moving spontaneously without limitations.   SKIN  Exam: Good skin turgor, no skin breakdown.   METABOLIC/FLUIDS/ELECTROLYTES lactated ringers. 1000 milliLiter(s) IV Continuous <Continuous>  HEMATOLOGIC [x] VTE Prophylaxis: enoxaparin Injectable 40 milliGRAM(s) SubCutaneous daily  Transfusions: [] PRBC [] Platelets [] FFP [] Cryoprecipitate  INFECTIOUS DISEASE  Antimicrobials/Immunologic Medications:  fluconAZOLE IVPB   meropenem IVPB 1000 milliGRAM(s) IV Intermittent every 8 hours  vancomycin IVPB   vancomycin IVPB 750 milliGRAM(s) IV Intermittent every 12 hours  ?  Tubes/Lines/Drains   [x] Peripheral IV  [] Central Venous Line [] R [] L [] IJ [] Fem [] SC Date Placed:   [] Arterial Line [] R [] L [] Fem [] Rad [] Ax Date Placed:   [] PICC [] Midline [] Mediport  [] Urinary Catheter Date Placed:   [x] Necessity of urinary, arterial, and venous catheters discussed  LABS  --------------------------------------------------------------------------------------  ((Insert SICU Labs here))***  --------------------------------------------------------------------------------------  ?      CT:   IMPRESSION:   New diffuse peritoneal enhancement, compatible with peritonitis, more extensive compared to 1/30/2019.  Multiple abdominal and pelvic fluid collections consistent with abscesses.  New small bowel wall may also edema and colonic wall thickening, likely reactive.  Left upper quadrant collection measuring greater than fluid attenuation is grossly unchanged from 1/30/2019.  Moderate left and small right pleural effusions with adjacent atelectasis, slightly decreased from 1/30/2019.  Diffuse thickening of the colon consistent with colitis.

## 2019-02-05 NOTE — PROGRESS NOTE ADULT - ASSESSMENT
54y Female PMHx HTN, T2DM, asthma, depression with recent diagnosis of invasive ampullary adenocarcinoma of the CBD admitted for cholangitis on 1/5, s/p ERCP 1/8, whipple 1/11, rapid response for GIB on floor 1/18 s/p MTP, RTOR s/p celiotomy, evacuation of hematoma, GDA bleed stopped and sutured, Abthera vac. Closure of abdomen with strattice mesh on 1/21, with inna drains and left lower JOURDAN drain in subcutaneous. Incisional bleed 1/23, bedside skin staples removed, JOURDAN drain removed, hemorrhage controlled, for continuation of ICU care.     PLAN:  -pain control as needed  -cont lovenox for DVT ppx  -PT/oob  -cont NPO/IVF  -start trickle feeds through tiger tube  -monitor VS  -c/w broad spectrum abx  -appreciate SICU care    D Team Surgery  [id number]

## 2019-02-05 NOTE — PROGRESS NOTE ADULT - ATTENDING COMMENTS
Seen and examined, chart and note reviewed, case discussed with SICU team    Sepsis secondary to bile peritonitis/abscess  a. s/p IR drainage  b.  Required vasopressor overnight  c.  Change diflucan to micafungin    ICU delirium  a.  Decreae seroquel to 25 mg q hs    At risk for malnutrition  a,  Discuss with primary team re: tf Seen and examined, chart and note reviewed, case discussed with SICU team    Sepsis secondary to bile peritonitis/abscess  a. s/p IR drainage  b.  Required vasopressor overnight  c.  Change diflucan to micafungin    Fluid overload  a.  Heplock IVF    ICU delirium  a.  Decrease Seroquel to 25 mg q hs    At risk for malnutrition  a,  Discuss with primary team re: tf    Spent 30 minutes in critical care

## 2019-02-05 NOTE — PROGRESS NOTE ADULT - ASSESSMENT
54y Female PMHx HTN, T2DM, asthma, depression with recent diagnosis of invasive ampullary adenocarcinoma of the CBD admitted for cholangitis on 1/5, s/p ERCP 1/8, whipple 1/11, rapid response for GIB on floor 1/18 s/p MTP, RTOR s/p celiotomy, evacuation of hematoma, GDA bleed stopped and sutured, Abthera vac. Closure of abdomen with strattice mesh on 1/21, with inna drains and left lower JOURDAN drain in subcutaneous. Incisional bleed 1/23, bedside skin staples removed, JOURDAN drain removed, hemorrhage controlled, respiratory failure s/p extubation 1/29, underwent CT A/P 2/2 which showed large LUQ collection and multiple smaller intraabdominal collections.  ?  Neurology:  -Tylenol & Oxy and Dilaudid PRN for pain  -Seroquel 50 for slee  ?  Resp:  - On AVAP  - f/u repeat ABG  - Diuresis  ?  CV:  -wean levo as tolerated  -goal MAP > 65  ?  GI:  - NPO / NGT for vomiting 2/3, Tigertube  - Repeated CT scan with multiple collections going to IR today for drainage  - Bowel regimen  - Increased secretions in drains green/brown in color, monitor, may need octreotide  - start protonixn   ?  Renal:  - sinha in, strict I&O, monitor lytes, replete as needed  - IVF on hold, will resuscitate as needed  ?  Heme:  -monitor H/H, tranfuse if <7   -ct lovenox vte ppx  - VIT k for INR   ?  ID:  - Febrile, uptrending leukocytosis on vanco (2/3), Diflucan (2/3), and meropenem (2/2)  - Vanco trough due at 5PM  - U/A neg, blood cx pending for last PAN cx done  - c-diff negative  - continue to trend WBC  ?  Endo  - ISS A1C8.0   ?  Access: Right AC 18G extended dwell peripheral IV, Left forearm 20G PIV, right femoral triple lumen, right radial A line  ?  Dispo: SICU  Critcal Care Diagnosis: Septic shock, respiratory abnormality, malnutrition, peritonitis, abdominal abscess 54y Female PMHx HTN, T2DM, asthma, depression with recent diagnosis of invasive ampullary adenocarcinoma of the CBD admitted for cholangitis on 1/5, s/p ERCP 1/8, whipple 1/11, rapid response for GIB on floor 1/18 s/p MTP, RTOR s/p celiotomy, evacuation of hematoma, GDA bleed stopped and sutured, Abthera vac. Closure of abdomen with strattice mesh on 1/21, with inna drains and left lower JOURDAN drain in subcutaneous. Incisional bleed 1/23, bedside skin staples removed, JOURDAN drain removed, hemorrhage controlled, respiratory failure s/p extubation 1/29, underwent CT A/P 2/2 which showed large LUQ collection and multiple smaller intraabdominal collections.  ?  Neurology:  -Tylenol & Oxy and Dilaudid PRN for pain  -Seroquel 50 for sleep, will reduce to 25     ?  Resp:  - f/u repeat ABG  -CXR overloaded, unsure if patient's BPs will tolerate diuresis; will limit IVF (saline lock)   - 97% on 5L NC   ?  CV:  -wean levo as tolerated  -goal MAP > 65  ?  GI:  - NPO, Tigertube  - start tickle feeds  - Repeated CT scan with multiple collections, s/p IR drainage of LUQ   - Bowel regimen  - Increased secretions in drains green/brown in color, monitor, may need octreotide  ?  Renal:  - sinha in, strict I&O, monitor lytes, replete as needed  - IVF on hold, will resuscitate as needed  ?  Heme:  -monitor H/H, tranfuse if <7   -ct lovenox vte ppx  ?  ID:  - Febrile, uptrending leukocytosis on vanco (2/3), Diflucan (2/3), and meropenem (2/2)  - Vanco trough due at 5PM  - U/A neg, blood cx pending for last PAN cx done  - c-diff negative  - continue to trend WBC+  - cx from drainage sent, only WBC no organisms   ?  Endo  - ISS A1C8.0   ?  Access: Right AC 18G extended dwell peripheral IV, Left forearm 20G PIV, right femoral triple lumen, right radial A line  ?  Dispo: SICU  Critcal Care Diagnosis: Septic shock, respiratory abnormality, malnutrition, peritonitis, abdominal abscess

## 2019-02-05 NOTE — PROGRESS NOTE ADULT - SUBJECTIVE AND OBJECTIVE BOX
Surgery Progress Note    S: Patient seen and examined. No acute events overnight. Patient underwent IR drainage of LUQ collection with drain left in place with SS output. Patient continues to be intermittently febrile with cultures sent. Patient has remained off pressors for 24h.    O:  Vital Signs Last 24 Hrs  T(C): 37.6 (05 Feb 2019 04:00), Max: 38.6 (04 Feb 2019 16:00)  T(F): 99.6 (05 Feb 2019 04:00), Max: 101.4 (04 Feb 2019 16:00)  HR: 100 (05 Feb 2019 07:00) (79 - 108)  BP: --  BP(mean): --  RR: 16 (05 Feb 2019 07:00) (11 - 37)  SpO2: 96% (05 Feb 2019 07:00) (92% - 100%)    I&O's Detail    04 Feb 2019 07:01  -  05 Feb 2019 07:00  --------------------------------------------------------  IN:    dextrose 5% + sodium chloride 0.45% with potassium chloride 20 mEq/L: 675 mL    IV PiggyBack: 850 mL    lactated ringers.: 450 mL    norepinephrine Infusion: 11.2 mL  Total IN: 1986.2 mL    OUT:    Bulb: 240 mL    Bulb: 110 mL    Drain: 25 mL    Indwelling Catheter - Urethral: 715 mL  Total OUT: 1090 mL    Total NET: 896.2 mL          MEDICATIONS  (STANDING):  artificial  tears Solution 1 Drop(s) Both EYES every 12 hours  chlorhexidine 4% Liquid 1 Application(s) Topical <User Schedule>  dextrose 5% + sodium chloride 0.45% with potassium chloride 20 mEq/L 1000 milliLiter(s) (50 mL/Hr) IV Continuous <Continuous>  enoxaparin Injectable 40 milliGRAM(s) SubCutaneous daily  fluconAZOLE IVPB      fluconAZOLE IVPB 200 milliGRAM(s) IV Intermittent every 24 hours  insulin lispro (HumaLOG) corrective regimen sliding scale   SubCutaneous every 6 hours  meropenem  IVPB 1000 milliGRAM(s) IV Intermittent every 8 hours  norepinephrine Infusion 0.02 MICROgram(s)/kG/Min (1.076 mL/Hr) IV Continuous <Continuous>  pantoprazole  Injectable 40 milliGRAM(s) IV Push daily  QUEtiapine 50 milliGRAM(s) Oral at bedtime  vancomycin  IVPB      vancomycin  IVPB 750 milliGRAM(s) IV Intermittent every 12 hours    MEDICATIONS  (PRN):  HYDROmorphone  Injectable 0.5 milliGRAM(s) IV Push every 4 hours PRN Pain unrelieved by PO  oxyCODONE    IR 5 milliGRAM(s) Oral every 4 hours PRN Moderate Pain (4 - 6)  oxyCODONE    IR 10 milliGRAM(s) Oral every 4 hours PRN Severe Pain (7 - 10)                            9.0    18.49 )-----------( 264      ( 05 Feb 2019 02:30 )             26.9       02-05    137  |  102  |  23  ----------------------------<  143<H>  3.9   |  25  |  0.66    Ca    7.9<L>      05 Feb 2019 02:30  Phos  2.8     02-05  Mg     2.0     02-05    TPro  5.2<L>  /  Alb  2.1<L>  /  TBili  4.3<H>  /  DBili  3.5<H>  /  AST  54<H>  /  ALT  23  /  AlkPhos  322<H>  02-05      Physical Exam:  Gen: Laying in bed, NAD, tiger tube in place  Resp: Unlabored breathing  Abd: soft, appropriately tender around midline wound, ND, midline dressings were saturated with bilious fluid this AM and was changed, RLQ and LLQ JOURDAN with bilious output, LUQ drain with SS output, no rebound or guarding  Ext: WWP  Skin: No rashes  : sinha in place draining yellow/clear urine

## 2019-02-06 LAB
ALBUMIN SERPL ELPH-MCNC: 1.9 G/DL — LOW (ref 3.3–5)
ALP SERPL-CCNC: 385 U/L — HIGH (ref 40–120)
ALT FLD-CCNC: 21 U/L — SIGNIFICANT CHANGE UP (ref 4–33)
ANION GAP SERPL CALC-SCNC: 9 MMO/L — SIGNIFICANT CHANGE UP (ref 7–14)
AST SERPL-CCNC: 56 U/L — HIGH (ref 4–32)
BACTERIA BLD CULT: SIGNIFICANT CHANGE UP
BACTERIA BLD CULT: SIGNIFICANT CHANGE UP
BASE EXCESS BLDA CALC-SCNC: 1.5 MMOL/L — SIGNIFICANT CHANGE UP
BILIRUB DIRECT SERPL-MCNC: 3.1 MG/DL — HIGH (ref 0.1–0.2)
BILIRUB SERPL-MCNC: 3.8 MG/DL — HIGH (ref 0.2–1.2)
BUN SERPL-MCNC: 16 MG/DL — SIGNIFICANT CHANGE UP (ref 7–23)
CA-I BLD-SCNC: 1.09 MMOL/L — SIGNIFICANT CHANGE UP (ref 1.03–1.23)
CA-I BLDA-SCNC: 1.15 MMOL/L — SIGNIFICANT CHANGE UP (ref 1.15–1.29)
CALCIUM SERPL-MCNC: 7.6 MG/DL — LOW (ref 8.4–10.5)
CHLORIDE SERPL-SCNC: 101 MMOL/L — SIGNIFICANT CHANGE UP (ref 98–107)
CO2 SERPL-SCNC: 24 MMOL/L — SIGNIFICANT CHANGE UP (ref 22–31)
CREAT SERPL-MCNC: 0.58 MG/DL — SIGNIFICANT CHANGE UP (ref 0.5–1.3)
GLUCOSE BLDA-MCNC: 131 MG/DL — HIGH (ref 70–99)
GLUCOSE SERPL-MCNC: 131 MG/DL — HIGH (ref 70–99)
HCO3 BLDA-SCNC: 26 MMOL/L — SIGNIFICANT CHANGE UP (ref 22–26)
HCT VFR BLD CALC: 27.6 % — LOW (ref 34.5–45)
HCT VFR BLDA CALC: 27.2 % — LOW (ref 34.5–46.5)
HGB BLD-MCNC: 8.7 G/DL — LOW (ref 11.5–15.5)
HGB BLDA-MCNC: 8.8 G/DL — LOW (ref 11.5–15.5)
LACTATE BLDA-SCNC: 2.2 MMOL/L — HIGH (ref 0.5–2)
MAGNESIUM SERPL-MCNC: 1.8 MG/DL — SIGNIFICANT CHANGE UP (ref 1.6–2.6)
MCHC RBC-ENTMCNC: 28 PG — SIGNIFICANT CHANGE UP (ref 27–34)
MCHC RBC-ENTMCNC: 31.5 % — LOW (ref 32–36)
MCV RBC AUTO: 88.7 FL — SIGNIFICANT CHANGE UP (ref 80–100)
NRBC # FLD: 0 K/UL — LOW (ref 25–125)
PCO2 BLDA: 39 MMHG — SIGNIFICANT CHANGE UP (ref 32–48)
PH BLDA: 7.43 PH — SIGNIFICANT CHANGE UP (ref 7.35–7.45)
PHOSPHATE SERPL-MCNC: 2.8 MG/DL — SIGNIFICANT CHANGE UP (ref 2.5–4.5)
PLATELET # BLD AUTO: 245 K/UL — SIGNIFICANT CHANGE UP (ref 150–400)
PMV BLD: 10.5 FL — SIGNIFICANT CHANGE UP (ref 7–13)
PO2 BLDA: 90 MMHG — SIGNIFICANT CHANGE UP (ref 83–108)
POTASSIUM BLDA-SCNC: 3.7 MMOL/L — SIGNIFICANT CHANGE UP (ref 3.4–4.5)
POTASSIUM SERPL-MCNC: 3.8 MMOL/L — SIGNIFICANT CHANGE UP (ref 3.5–5.3)
POTASSIUM SERPL-SCNC: 3.8 MMOL/L — SIGNIFICANT CHANGE UP (ref 3.5–5.3)
PROT SERPL-MCNC: 5.3 G/DL — LOW (ref 6–8.3)
RBC # BLD: 3.11 M/UL — LOW (ref 3.8–5.2)
RBC # FLD: 17.7 % — HIGH (ref 10.3–14.5)
SAO2 % BLDA: 97.6 % — SIGNIFICANT CHANGE UP (ref 95–99)
SODIUM BLDA-SCNC: 133 MMOL/L — LOW (ref 136–146)
SODIUM SERPL-SCNC: 134 MMOL/L — LOW (ref 135–145)
WBC # BLD: 14.34 K/UL — HIGH (ref 3.8–10.5)
WBC # FLD AUTO: 14.34 K/UL — HIGH (ref 3.8–10.5)

## 2019-02-06 PROCEDURE — 99233 SBSQ HOSP IP/OBS HIGH 50: CPT

## 2019-02-06 PROCEDURE — 71045 X-RAY EXAM CHEST 1 VIEW: CPT | Mod: 26

## 2019-02-06 RX ORDER — ACETAMINOPHEN 500 MG
650 TABLET ORAL EVERY 6 HOURS
Qty: 0 | Refills: 0 | Status: DISCONTINUED | OUTPATIENT
Start: 2019-02-06 | End: 2019-02-08

## 2019-02-06 RX ORDER — OXYCODONE HYDROCHLORIDE 5 MG/1
5 TABLET ORAL EVERY 4 HOURS
Qty: 0 | Refills: 0 | Status: DISCONTINUED | OUTPATIENT
Start: 2019-02-06 | End: 2019-02-08

## 2019-02-06 RX ORDER — INSULIN LISPRO 100/ML
4 VIAL (ML) SUBCUTANEOUS ONCE
Qty: 0 | Refills: 0 | Status: DISCONTINUED | OUTPATIENT
Start: 2019-02-06 | End: 2019-02-06

## 2019-02-06 RX ORDER — QUETIAPINE FUMARATE 200 MG/1
12.5 TABLET, FILM COATED ORAL AT BEDTIME
Qty: 0 | Refills: 0 | Status: DISCONTINUED | OUTPATIENT
Start: 2019-02-06 | End: 2019-02-07

## 2019-02-06 RX ORDER — HYDROMORPHONE HYDROCHLORIDE 2 MG/ML
0.5 INJECTION INTRAMUSCULAR; INTRAVENOUS; SUBCUTANEOUS ONCE
Qty: 0 | Refills: 0 | Status: DISCONTINUED | OUTPATIENT
Start: 2019-02-06 | End: 2019-02-06

## 2019-02-06 RX ORDER — OXYCODONE HYDROCHLORIDE 5 MG/1
10 TABLET ORAL EVERY 4 HOURS
Qty: 0 | Refills: 0 | Status: DISCONTINUED | OUTPATIENT
Start: 2019-02-06 | End: 2019-02-13

## 2019-02-06 RX ORDER — PANTOPRAZOLE SODIUM 20 MG/1
40 TABLET, DELAYED RELEASE ORAL DAILY
Qty: 0 | Refills: 0 | Status: DISCONTINUED | OUTPATIENT
Start: 2019-02-06 | End: 2019-02-15

## 2019-02-06 RX ORDER — PANTOPRAZOLE SODIUM 20 MG/1
40 TABLET, DELAYED RELEASE ORAL DAILY
Qty: 0 | Refills: 0 | Status: DISCONTINUED | OUTPATIENT
Start: 2019-02-06 | End: 2019-02-06

## 2019-02-06 RX ADMIN — Medication 650 MILLIGRAM(S): at 22:40

## 2019-02-06 RX ADMIN — PANTOPRAZOLE SODIUM 40 MILLIGRAM(S): 20 TABLET, DELAYED RELEASE ORAL at 12:15

## 2019-02-06 RX ADMIN — CHLORHEXIDINE GLUCONATE 1 APPLICATION(S): 213 SOLUTION TOPICAL at 05:41

## 2019-02-06 RX ADMIN — OXYCODONE HYDROCHLORIDE 5 MILLIGRAM(S): 5 TABLET ORAL at 05:32

## 2019-02-06 RX ADMIN — OXYCODONE HYDROCHLORIDE 5 MILLIGRAM(S): 5 TABLET ORAL at 06:02

## 2019-02-06 RX ADMIN — MICAFUNGIN SODIUM 107.5 MILLIGRAM(S): 100 INJECTION, POWDER, LYOPHILIZED, FOR SOLUTION INTRAVENOUS at 12:15

## 2019-02-06 RX ADMIN — Medication 650 MILLIGRAM(S): at 09:34

## 2019-02-06 RX ADMIN — QUETIAPINE FUMARATE 12.5 MILLIGRAM(S): 200 TABLET, FILM COATED ORAL at 22:06

## 2019-02-06 RX ADMIN — Medication 650 MILLIGRAM(S): at 09:33

## 2019-02-06 RX ADMIN — OXYCODONE HYDROCHLORIDE 5 MILLIGRAM(S): 5 TABLET ORAL at 13:24

## 2019-02-06 RX ADMIN — Medication 650 MILLIGRAM(S): at 16:32

## 2019-02-06 RX ADMIN — HYDROMORPHONE HYDROCHLORIDE 0.5 MILLIGRAM(S): 2 INJECTION INTRAMUSCULAR; INTRAVENOUS; SUBCUTANEOUS at 17:10

## 2019-02-06 RX ADMIN — Medication 1 DROP(S): at 05:33

## 2019-02-06 RX ADMIN — HYDROMORPHONE HYDROCHLORIDE 0.5 MILLIGRAM(S): 2 INJECTION INTRAMUSCULAR; INTRAVENOUS; SUBCUTANEOUS at 18:00

## 2019-02-06 RX ADMIN — OXYCODONE HYDROCHLORIDE 5 MILLIGRAM(S): 5 TABLET ORAL at 14:47

## 2019-02-06 RX ADMIN — Medication 650 MILLIGRAM(S): at 22:06

## 2019-02-06 RX ADMIN — HYDROMORPHONE HYDROCHLORIDE 0.5 MILLIGRAM(S): 2 INJECTION INTRAMUSCULAR; INTRAVENOUS; SUBCUTANEOUS at 18:29

## 2019-02-06 RX ADMIN — MEROPENEM 100 MILLIGRAM(S): 1 INJECTION INTRAVENOUS at 13:23

## 2019-02-06 RX ADMIN — MEROPENEM 100 MILLIGRAM(S): 1 INJECTION INTRAVENOUS at 05:32

## 2019-02-06 RX ADMIN — MEROPENEM 100 MILLIGRAM(S): 1 INJECTION INTRAVENOUS at 21:14

## 2019-02-06 RX ADMIN — HYDROMORPHONE HYDROCHLORIDE 0.5 MILLIGRAM(S): 2 INJECTION INTRAMUSCULAR; INTRAVENOUS; SUBCUTANEOUS at 16:34

## 2019-02-06 RX ADMIN — ENOXAPARIN SODIUM 40 MILLIGRAM(S): 100 INJECTION SUBCUTANEOUS at 12:15

## 2019-02-06 RX ADMIN — Medication 650 MILLIGRAM(S): at 16:02

## 2019-02-06 RX ADMIN — OXYCODONE HYDROCHLORIDE 10 MILLIGRAM(S): 5 TABLET ORAL at 20:39

## 2019-02-06 RX ADMIN — Medication 250 MILLIGRAM(S): at 19:37

## 2019-02-06 RX ADMIN — Medication 250 MILLIGRAM(S): at 05:32

## 2019-02-06 RX ADMIN — Medication 1 DROP(S): at 17:52

## 2019-02-06 RX ADMIN — OXYCODONE HYDROCHLORIDE 10 MILLIGRAM(S): 5 TABLET ORAL at 21:10

## 2019-02-06 NOTE — PROGRESS NOTE ADULT - SUBJECTIVE AND OBJECTIVE BOX
SICU Morning Progress Note     Interval/Overnight Events: Patient started on tube feeds yesterday via tiger tube, she was IV locked. Her diflucan was changed to micafungin and her seroquel dose was halfed. Patient continues to be febrile. Now off vasopressors    HPI: 54y Female PMHx HTN, T2DM, asthma, depression with recent diagnosis of invasive ampullary adenocarcinoma of the CBD admitted for cholangitis on 1/5, s/p ERCP 1/8, whipple 1/11, rapid response for GIB on floor 1/18 s/p MTP, RTOR s/p celiotomy, evacuation of hematoma, GDA bleed stopped and sutured, Abthera vac. Closure of abdomen with strattice mesh on 1/21, with inna drains and left lower JOURDAN drain in subcutaneous, s/p incisional bleed 1/23, bedside skin staples removed, JOURDAN drain removed, hemorrhage controlled, for continuation of ICU care for respiratory failure requiring intubation s/p extubation 1/29, underwent CT A/P 2/2 which showed large LUQ collection and multiple smaller intraabdominal collection s/p IR drainage 2/4      Allergies: No Known Allergies    MEDICATIONS:   Neurologic Medications  HYDROmorphone   oxyCODONE      QUEtiapine 25 milliGRAM(s) Oral at bedtime  Respiratory Medications  Cardiovascular Medications  norepinephrine Infusion 0.02 MICROgram(s)/kG/Min IV Continuous <Continuous>  Gastrointestinal Medications  pantoprazole  Injectable 40 milliGRAM(s) IV Push daily  Genitourinary Medications  Hematologic/Oncologic Medications  enoxaparin Injectable 40 milliGRAM(s) SubCutaneous daily  Antimicrobial/Immunologic Medications  meropenem  IVPB 1000 milliGRAM(s) IV Intermittent every 8 hours  micafungin IVPB      vancomycin  IVPB      vancomycin  IVPB 750 milliGRAM(s) IV Intermittent every 12 hours  Endocrine/Metabolic Medications  insulin lispro (HumaLOG) corrective regimen sliding scale   SubCutaneous every 6 hours  Topical/Other Medications  artificial  tears Solution 1 Drop(s) Both EYES every 12 hours  chlorhexidine 4% Liquid 1 Application(s) Topical <User Schedule>    VITAL SIGNS, INS/OUTS (last 24 hours):  T(C): 38.3 (02-05-19 @ 20:00), Max: 38.8 (02-05-19 @ 12:00)  HR: 100 (02-05-19 @ 23:00) (79 - 105)  BP: 135/58 (02-05-19 @ 21:19) (135/58 - 135/58)  BP(mean): 77 (02-05-19 @ 21:19) (77 - 77)  ABP: 143/64 (02-05-19 @ 23:00) (108/51 - 157/75)  ABP(mean): 95 (02-05-19 @ 23:00) (72 - 111)  RR: 16 (02-05-19 @ 23:00) (13 - 37)  SpO2: 99% (02-05-19 @ 23:00) (94% - 100%)  CAPILLARY BLOOD GLUCOSE  POCT Blood Glucose.: 112 mg/dL (05 Feb 2019 18:05)  POCT Blood Glucose.: 136 mg/dL (05 Feb 2019 12:56)  POCT Blood Glucose.: 131 mg/dL (05 Feb 2019 06:19)      02-05 @ 07:01  -  02-06 @ 00:12  --------------------------------------------------------  IN:    dextrose 5% + sodium chloride 0.45% with potassium chloride 20 mEq/L: 100 mL    Glucerna: 225 mL    IV PiggyBack: 150 mL  Total IN: 475 mL    OUT:    Bulb: 25 mL    Bulb: 185 mL    Indwelling Catheter - Urethral: 575 mL  Total OUT: 785 mL  Total NET: -310 mL    EXAM  NEUROLOGY   Exam: Normal, NAD, alert, oriented x 3, no focal deficit, follows commands  HEENT  Exam: Normocephalic, atraumatic, EOMI.   RESPIRATORY  Exam: Lungs clear to auscultation, nonlabored breathing   CARDIOVASCULAR  Exam: S1, S2. Regular rate and rhythm.   GI/NUTRITION  Exam: Abdomen soft, mildly tender, midline dressing saturated with bilious fluid, skin open, wound has no surrounding erythema, R JOURDAN drain minimal bilious output left JOURDAN drain with bilious drainage, IR drain with serosang drainage.   VASCULAR  Exam: Extremities warm, pink, well-perfused.   MUSCULOSKELETAL  Exam: All extremities moving spontaneously without limitations.   SKIN  Exam: Good skin turgor, no skin breakdown.   HEMATOLOGIC [x] VTE Prophylaxis: enoxaparin Injectable 40 milliGRAM(s) SubCutaneous daily    Tubes/Lines/Drains   [x] Peripheral IV  [] Central Venous Line [] R [] L [] IJ [] Fem [] SC Date Placed:   [x] Arterial Line [] R [x] L [] Fem [] Rad [x] Ax Date Placed:   [] PICC [] Midline [] Mediport  [] Urinary Catheter Date Placed:   [x] Necessity of urinary, arterial, and venous catheters discussed    LABS        --------------------------------------------------------------------------------------    CT: IMPRESSION:   New diffuse peritoneal enhancement, compatible with peritonitis, more   extensive compared to 1/30/2019.  Multiple abdominal and pelvic fluid collections consistent with abscesses.  New small bowel wall may also edema and colonic wall thickening, likely   reactive.  Left upper quadrant collection measuring greater than fluid attenuation   is grossly unchanged from 1/30/2019.  Moderate left and small right pleural effusions with adjacent   atelectasis, slightly decreased from 1/30/2019.  Diffuse thickening of the colon consistent with colitis.

## 2019-02-06 NOTE — PROGRESS NOTE ADULT - SUBJECTIVE AND OBJECTIVE BOX
Surgery Progress Note    S: Patient seen and examined. No acute events overnight. Patient was started on trickle feeds yesterday via tiger tube and tolerating without n/v. Diflucan was changed to micafungin. Patient continues to be intermittently febrile.     O:  Vital Signs Last 24 Hrs  T(C): 37.8 (06 Feb 2019 04:00), Max: 38.8 (05 Feb 2019 12:00)  T(F): 100 (06 Feb 2019 04:00), Max: 101.9 (05 Feb 2019 12:00)  HR: 97 (06 Feb 2019 06:00) (84 - 103)  BP: 135/58 (05 Feb 2019 21:19) (135/58 - 135/58)  BP(mean): 77 (05 Feb 2019 21:19) (77 - 77)  RR: 18 (06 Feb 2019 06:00) (15 - 26)  SpO2: 97% (06 Feb 2019 06:00) (94% - 99%)    I&O's Detail    04 Feb 2019 07:01  -  05 Feb 2019 07:00  --------------------------------------------------------  IN:    dextrose 5% + sodium chloride 0.45% with potassium chloride 20 mEq/L: 675 mL    IV PiggyBack: 850 mL    lactated ringers.: 450 mL    norepinephrine Infusion: 11.2 mL  Total IN: 1986.2 mL    OUT:    Bulb: 240 mL    Bulb: 110 mL    Drain: 25 mL    Indwelling Catheter - Urethral: 715 mL  Total OUT: 1090 mL    Total NET: 896.2 mL      05 Feb 2019 07:01  -  06 Feb 2019 06:37  --------------------------------------------------------  IN:    dextrose 5% + sodium chloride 0.45% with potassium chloride 20 mEq/L: 100 mL    Glucerna: 400 mL    IV PiggyBack: 450 mL  Total IN: 950 mL    OUT:    Bulb: 35 mL    Bulb: 285 mL    Drain: 25 mL    Indwelling Catheter - Urethral: 775 mL  Total OUT: 1120 mL    Total NET: -170 mL          MEDICATIONS  (STANDING):  artificial  tears Solution 1 Drop(s) Both EYES every 12 hours  chlorhexidine 4% Liquid 1 Application(s) Topical <User Schedule>  enoxaparin Injectable 40 milliGRAM(s) SubCutaneous daily  insulin lispro (HumaLOG) corrective regimen sliding scale   SubCutaneous every 6 hours  meropenem  IVPB 1000 milliGRAM(s) IV Intermittent every 8 hours  micafungin IVPB      micafungin IVPB 150 milliGRAM(s) IV Intermittent every 24 hours  pantoprazole  Injectable 40 milliGRAM(s) IV Push daily  QUEtiapine 25 milliGRAM(s) Oral at bedtime  vancomycin  IVPB      vancomycin  IVPB 750 milliGRAM(s) IV Intermittent every 12 hours    MEDICATIONS  (PRN):  HYDROmorphone  Injectable 0.5 milliGRAM(s) IV Push every 4 hours PRN Pain unrelieved by PO  oxyCODONE    IR 5 milliGRAM(s) Oral every 4 hours PRN Moderate Pain (4 - 6)  oxyCODONE    IR 10 milliGRAM(s) Oral every 4 hours PRN Severe Pain (7 - 10)                            8.7    14.34 )-----------( 245      ( 06 Feb 2019 02:40 )             27.6       02-06    134<L>  |  101  |  16  ----------------------------<  131<H>  3.8   |  24  |  0.58    Ca    7.6<L>      06 Feb 2019 02:40  Phos  2.8     02-06  Mg     1.8     02-06    TPro  5.3<L>  /  Alb  1.9<L>  /  TBili  3.8<H>  /  DBili  3.1<H>  /  AST  56<H>  /  ALT  21  /  AlkPhos  385<H>  02-06      Physical Exam:  Gen: Laying in bed, NAD, tiger tube in place  Resp: Unlabored breathing  Abd: soft, appropriately tender around midline wound, ND, midline dressings were saturated with bilious fluid this AM and was changed, RLQ and LLQ JOURDAN with bilious output, LUQ drain with SS output, no rebound or guarding  Ext: WWP  Skin: No rashes  : sinha in place draining yellow/clear urine

## 2019-02-06 NOTE — PROGRESS NOTE ADULT - ATTENDING COMMENTS
54y Female PMHx HTN, T2DM, asthma, depression with recent diagnosis of invasive ampullary adenocarcinoma of the CBD admitted for cholangitis on 1/5, s/p ERCP 1/8, whipple 1/11, rapid response for GIB on floor 1/18 s/p MTP, RTOR s/p celiotomy, evacuation of hematoma, GDA bleed stopped and sutured, Abthera vac. Closure of abdomen with strattice mesh on 1/21, with inna drains and left lower JOURDAN drain in subcutaneous. Incisional bleed 1/23, bedside skin staples removed, JOURDAN drain removed, hemorrhage controlled, respiratory failure s/p extubation 1/29, underwent CT A/P 2/2 which showed large LUQ collection and multiple smaller intraabdominal collections s/p IR drainage 2/4 with placement of drain.  ?  Neurology:  -Tylenol & Oxy and Dilaudid PRN for pain  -Seroquel 25 for sleep, decrease to 12.5    Resp:  -CXR overloaded, unsure if patient's BPs will tolerate diuresis; will limit IVF (saline lock)   - 97% on 5L NC   ?  CV:  -goal MAP > 65  ?  GI:  - NPO, Tigertube  - Continue feeds, increase to goal   - Bowel regimen  - Increased secretions in drains green/brown in color, willl place vac by plastics   ?  Renal:  -  monitor lytes, replete as needed  - IVF on hold, will resuscitate as needed  - remove sinha   ?  Heme:  -monitor H/H, transfuse if <7   -ct lovenox vte ppx  ?  ID:  - Febrile, uptrending leukocytosis on vanco (2/3), Diflucan (2/3) --> micafungin (2/5), and meropenem (2/2)  - Vanco trough 2/5 pm 15, due 2/7 am  - U/A neg, blood cx neg 2/1  - c-diff negative  - continue to trend WBC+  - cx from drainage sent, only WBC no organisms   ?  Endo  - ISS A1C8.0   ?      Dispo: SICU  Critcal Care Diagnosis: Septic shock, respiratory abnormality, malnutrition, peritonitis, abdominal abscess    The patient is a critical care patient with life threatening hemodynamic and metabolic instability in SICU.  I have personally interviewed when possible and examined the patient, reviewed data and laboratory tests/x-rays and all pertinent electronic images.  I was physically present for the key portions of the evaluation and management (E/M) service provided.   The SICU team has a constant risk benefit analyzes discussion with the primary team, all consultants, House Staff and PA's on all decisions.  These diagnoses are unrelated to the surgical procedure noted above.  I meet with family if needed to get further history, discuss the case and make care decisions for this patient who might not be able to participate.  Time involved in performance of separately billable procedures was not counted toward my critical care time. There is no overlap.  I spent 55-75 minutes of critical care time for the diagnoses, assessment, plan and interventions.

## 2019-02-06 NOTE — PROGRESS NOTE ADULT - ASSESSMENT
54y Female PMHx HTN, T2DM, asthma, depression with recent diagnosis of invasive ampullary adenocarcinoma of the CBD admitted for cholangitis on 1/5, s/p ERCP 1/8, whipple 1/11, rapid response for GIB on floor 1/18 s/p MTP, RTOR s/p celiotomy, evacuation of hematoma, GDA bleed stopped and sutured, Abthera vac. Closure of abdomen with strattice mesh on 1/21, with inna drains and left lower JOURDAN drain in subcutaneous. Incisional bleed 1/23, bedside skin staples removed, JOURDAN drain removed, hemorrhage controlled, for continuation of ICU care.     PLAN:  -pain control as needed  -cont lovenox for DVT ppx  -PT/oob  -cont NPO/IVF  -c/w tube feeds via tiger tube  -monitor VS  -c/w broad spectrum abx  -consider ostomy nurse placement of pouch over midline wound for drainage of bilious fluid  -appreciate SICU care    D Team Surgery  [id number]

## 2019-02-07 LAB
ALBUMIN SERPL ELPH-MCNC: 1.9 G/DL — LOW (ref 3.3–5)
ALP SERPL-CCNC: 357 U/L — HIGH (ref 40–120)
ALT FLD-CCNC: 19 U/L — SIGNIFICANT CHANGE UP (ref 4–33)
ANION GAP SERPL CALC-SCNC: 12 MMO/L — SIGNIFICANT CHANGE UP (ref 7–14)
ANISOCYTOSIS BLD QL: SLIGHT — SIGNIFICANT CHANGE UP
APTT BLD: 24.5 SEC — LOW (ref 27.5–36.3)
AST SERPL-CCNC: 43 U/L — HIGH (ref 4–32)
BASOPHILS # BLD AUTO: 0.09 K/UL — SIGNIFICANT CHANGE UP (ref 0–0.2)
BASOPHILS NFR BLD AUTO: 0.5 % — SIGNIFICANT CHANGE UP (ref 0–2)
BASOPHILS NFR SPEC: 0 % — SIGNIFICANT CHANGE UP (ref 0–2)
BILIRUB DIRECT SERPL-MCNC: 2.7 MG/DL — HIGH (ref 0.1–0.2)
BILIRUB SERPL-MCNC: 3.6 MG/DL — HIGH (ref 0.2–1.2)
BLASTS # FLD: 0 % — SIGNIFICANT CHANGE UP (ref 0–0)
BLD GP AB SCN SERPL QL: NEGATIVE — SIGNIFICANT CHANGE UP
BUN SERPL-MCNC: 13 MG/DL — SIGNIFICANT CHANGE UP (ref 7–23)
CALCIUM SERPL-MCNC: 7.8 MG/DL — LOW (ref 8.4–10.5)
CHLORIDE SERPL-SCNC: 103 MMOL/L — SIGNIFICANT CHANGE UP (ref 98–107)
CO2 SERPL-SCNC: 23 MMOL/L — SIGNIFICANT CHANGE UP (ref 22–31)
CREAT SERPL-MCNC: 0.52 MG/DL — SIGNIFICANT CHANGE UP (ref 0.5–1.3)
EOSINOPHIL # BLD AUTO: 0.34 K/UL — SIGNIFICANT CHANGE UP (ref 0–0.5)
EOSINOPHIL NFR BLD AUTO: 2 % — SIGNIFICANT CHANGE UP (ref 0–6)
EOSINOPHIL NFR FLD: 5.2 % — SIGNIFICANT CHANGE UP (ref 0–6)
GIANT PLATELETS BLD QL SMEAR: PRESENT — SIGNIFICANT CHANGE UP
GLUCOSE SERPL-MCNC: 136 MG/DL — HIGH (ref 70–99)
HCT VFR BLD CALC: 27.6 % — LOW (ref 34.5–45)
HGB BLD-MCNC: 8.9 G/DL — LOW (ref 11.5–15.5)
HYPOCHROMIA BLD QL: SLIGHT — SIGNIFICANT CHANGE UP
IMM GRANULOCYTES NFR BLD AUTO: 4.2 % — HIGH (ref 0–1.5)
INR BLD: 1.6 — HIGH (ref 0.88–1.17)
LYMPHOCYTES # BLD AUTO: 1.86 K/UL — SIGNIFICANT CHANGE UP (ref 1–3.3)
LYMPHOCYTES # BLD AUTO: 11 % — LOW (ref 13–44)
LYMPHOCYTES NFR SPEC AUTO: 3.4 % — LOW (ref 13–44)
MACROCYTES BLD QL: SLIGHT — SIGNIFICANT CHANGE UP
MAGNESIUM SERPL-MCNC: 1.8 MG/DL — SIGNIFICANT CHANGE UP (ref 1.6–2.6)
MCHC RBC-ENTMCNC: 28.6 PG — SIGNIFICANT CHANGE UP (ref 27–34)
MCHC RBC-ENTMCNC: 32.2 % — SIGNIFICANT CHANGE UP (ref 32–36)
MCV RBC AUTO: 88.7 FL — SIGNIFICANT CHANGE UP (ref 80–100)
METAMYELOCYTES # FLD: 0 % — SIGNIFICANT CHANGE UP (ref 0–1)
MONOCYTES # BLD AUTO: 2.02 K/UL — HIGH (ref 0–0.9)
MONOCYTES NFR BLD AUTO: 11.9 % — SIGNIFICANT CHANGE UP (ref 2–14)
MONOCYTES NFR BLD: 1.7 % — LOW (ref 2–9)
MYELOCYTES NFR BLD: 0 % — SIGNIFICANT CHANGE UP (ref 0–0)
NEUTROPHIL AB SER-ACNC: 87.1 % — HIGH (ref 43–77)
NEUTROPHILS # BLD AUTO: 11.95 K/UL — HIGH (ref 1.8–7.4)
NEUTROPHILS NFR BLD AUTO: 70.4 % — SIGNIFICANT CHANGE UP (ref 43–77)
NEUTS BAND # BLD: 1.7 % — SIGNIFICANT CHANGE UP (ref 0–6)
NRBC # FLD: 0 K/UL — LOW (ref 25–125)
OTHER - HEMATOLOGY %: 0 — SIGNIFICANT CHANGE UP
PHOSPHATE SERPL-MCNC: 2.6 MG/DL — SIGNIFICANT CHANGE UP (ref 2.5–4.5)
PLATELET # BLD AUTO: 245 K/UL — SIGNIFICANT CHANGE UP (ref 150–400)
PLATELET COUNT - ESTIMATE: NORMAL — SIGNIFICANT CHANGE UP
PMV BLD: 10.7 FL — SIGNIFICANT CHANGE UP (ref 7–13)
POIKILOCYTOSIS BLD QL AUTO: SLIGHT — SIGNIFICANT CHANGE UP
POLYCHROMASIA BLD QL SMEAR: SLIGHT — SIGNIFICANT CHANGE UP
POTASSIUM SERPL-MCNC: 4.3 MMOL/L — SIGNIFICANT CHANGE UP (ref 3.5–5.3)
POTASSIUM SERPL-SCNC: 4.3 MMOL/L — SIGNIFICANT CHANGE UP (ref 3.5–5.3)
PROMYELOCYTES # FLD: 0 % — SIGNIFICANT CHANGE UP (ref 0–0)
PROT SERPL-MCNC: 5.5 G/DL — LOW (ref 6–8.3)
PROTHROM AB SERPL-ACNC: 18.5 SEC — HIGH (ref 9.8–13.1)
RBC # BLD: 3.11 M/UL — LOW (ref 3.8–5.2)
RBC # FLD: 17.7 % — HIGH (ref 10.3–14.5)
RH IG SCN BLD-IMP: POSITIVE — SIGNIFICANT CHANGE UP
SODIUM SERPL-SCNC: 138 MMOL/L — SIGNIFICANT CHANGE UP (ref 135–145)
SPECIMEN SOURCE: SIGNIFICANT CHANGE UP
VANCOMYCIN TROUGH SERPL-MCNC: 18.5 UG/ML — SIGNIFICANT CHANGE UP (ref 10–20)
VARIANT LYMPHS # BLD: 0.9 % — SIGNIFICANT CHANGE UP
WBC # BLD: 16.98 K/UL — HIGH (ref 3.8–10.5)
WBC # FLD AUTO: 16.98 K/UL — HIGH (ref 3.8–10.5)

## 2019-02-07 PROCEDURE — 71045 X-RAY EXAM CHEST 1 VIEW: CPT | Mod: 26

## 2019-02-07 PROCEDURE — 99233 SBSQ HOSP IP/OBS HIGH 50: CPT

## 2019-02-07 RX ORDER — MAGNESIUM SULFATE 500 MG/ML
2 VIAL (ML) INJECTION ONCE
Qty: 0 | Refills: 0 | Status: COMPLETED | OUTPATIENT
Start: 2019-02-07 | End: 2019-02-07

## 2019-02-07 RX ORDER — ACETAMINOPHEN 500 MG
1000 TABLET ORAL ONCE
Qty: 0 | Refills: 0 | Status: COMPLETED | OUTPATIENT
Start: 2019-02-07 | End: 2019-02-07

## 2019-02-07 RX ADMIN — OXYCODONE HYDROCHLORIDE 5 MILLIGRAM(S): 5 TABLET ORAL at 19:26

## 2019-02-07 RX ADMIN — MICAFUNGIN SODIUM 107.5 MILLIGRAM(S): 100 INJECTION, POWDER, LYOPHILIZED, FOR SOLUTION INTRAVENOUS at 12:12

## 2019-02-07 RX ADMIN — Medication 100 GRAM(S): at 04:12

## 2019-02-07 RX ADMIN — Medication 400 MILLIGRAM(S): at 04:13

## 2019-02-07 RX ADMIN — Medication 63.75 MILLIMOLE(S): at 06:21

## 2019-02-07 RX ADMIN — Medication 1 DROP(S): at 06:17

## 2019-02-07 RX ADMIN — OXYCODONE HYDROCHLORIDE 10 MILLIGRAM(S): 5 TABLET ORAL at 20:50

## 2019-02-07 RX ADMIN — OXYCODONE HYDROCHLORIDE 10 MILLIGRAM(S): 5 TABLET ORAL at 21:20

## 2019-02-07 RX ADMIN — Medication 2: at 11:57

## 2019-02-07 RX ADMIN — MEROPENEM 100 MILLIGRAM(S): 1 INJECTION INTRAVENOUS at 15:16

## 2019-02-07 RX ADMIN — Medication 650 MILLIGRAM(S): at 15:16

## 2019-02-07 RX ADMIN — ENOXAPARIN SODIUM 40 MILLIGRAM(S): 100 INJECTION SUBCUTANEOUS at 11:37

## 2019-02-07 RX ADMIN — MEROPENEM 100 MILLIGRAM(S): 1 INJECTION INTRAVENOUS at 21:00

## 2019-02-07 RX ADMIN — Medication 250 MILLIGRAM(S): at 20:49

## 2019-02-07 RX ADMIN — Medication 650 MILLIGRAM(S): at 16:16

## 2019-02-07 RX ADMIN — PANTOPRAZOLE SODIUM 40 MILLIGRAM(S): 20 TABLET, DELAYED RELEASE ORAL at 11:38

## 2019-02-07 RX ADMIN — MEROPENEM 100 MILLIGRAM(S): 1 INJECTION INTRAVENOUS at 06:17

## 2019-02-07 RX ADMIN — Medication 250 MILLIGRAM(S): at 06:21

## 2019-02-07 RX ADMIN — OXYCODONE HYDROCHLORIDE 5 MILLIGRAM(S): 5 TABLET ORAL at 18:26

## 2019-02-07 RX ADMIN — Medication 1 DROP(S): at 18:26

## 2019-02-07 RX ADMIN — Medication 1000 MILLIGRAM(S): at 05:00

## 2019-02-07 NOTE — PROGRESS NOTE ADULT - ASSESSMENT
54F with history of cholangitis found to have CBD adenocarcinoma s/p Whipple procedure (on 1/11/2019) c/b GI bleed s/p RTOR for ligation (on 1/18/2019) followed by washout and placement of Strattice mesh (1/21/2019) s/p application of external tissue expander device (on 1/24/2019), s/p wound vac application (2/6/19)  - External tissue expander device to remain in place until sufficient skin laxity to allow for skin closure. Do not remove or otherwise touch this device. It will automatically tighten on its own without external intervention. It will also maintain appropriate tension on the wound over time.   - wound vac changes every 2-3 days  - Nutritional optimization.  - Medical optimization.  - Care per primary    Plastic Surgery [id number]

## 2019-02-07 NOTE — PROGRESS NOTE ADULT - ASSESSMENT
54y Female PMHx HTN, T2DM, asthma, depression with recent diagnosis of invasive ampullary adenocarcinoma of the CBD admitted for cholangitis on 1/5, s/p ERCP 1/8, whipple 1/11, rapid response for GIB on floor 1/18 s/p MTP, RTOR s/p celiotomy, evacuation of hematoma, GDA bleed stopped and sutured, Abthera vac. Closure of abdomen with strattice mesh on 1/21, with inna drains and left lower JOURDAN drain in subcutaneous. Incisional bleed 1/23, bedside skin staples removed, JOURDAN drain removed, hemorrhage controlled, respiratory failure s/p extubation 1/29, underwent CT A/P 2/2 which showed large LUQ collection and multiple smaller intraabdominal collections s/p IR drainage 2/4 with placement of drain.  ?  Neurology:  -Tylenol & Oxy and Dilaudid PRN for pain  -Seroquel 25 for sleep, decrease to 12.5    Resp:  -CXR overloaded, unsure if patient's BPs will tolerate diuresis; will limit IVF (saline lock)   - 97% on 5L NC   ?  CV:  -goal MAP > 65  ?  GI:  - NPO, Tigertube  - Continue feeds, increase to goal   - Bowel regimen  - Increased secretions in drains green/brown in color, vac placed by plastics, monitor output   ?  Renal:  -  monitor lytes, replete as needed  - IVF on hold, will resuscitate as needed  - remove sinha   ?  Heme:  -monitor H/H, transfuse if <7   -ct lovenox vte ppx  ?  ID:  - Febrile, uptrending leukocytosis on vanco (2/3), Diflucan (2/3) --> micafungin (2/5), and meropenem (2/2)  - Vanco trough 2/5 pm 15, due 2/7 am  - U/A neg, blood cx neg 2/1  - c-diff negative  - continue to trend WBC+  - cx from drainage sent, only WBC no organisms   ?  Endo  - ISS A1C8.0   ?  Dispo: SICU  Critcal Care Diagnosis: Septic shock, respiratory abnormality, malnutrition, peritonitis, abdominal abscess 54y Female PMHx HTN, T2DM, asthma, depression with recent diagnosis of invasive ampullary adenocarcinoma of the CBD admitted for cholangitis on 1/5, s/p ERCP 1/8, whipple 1/11, rapid response for GIB on floor 1/18 s/p MTP, RTOR s/p celiotomy, evacuation of hematoma, GDA bleed stopped and sutured, Abthera vac. Closure of abdomen with strattice mesh on 1/21, with inna drains and left lower JOURDAN drain in subcutaneous. Incisional bleed 1/23, bedside skin staples removed, JOURDAN drain removed, hemorrhage controlled, respiratory failure s/p extubation 1/29, underwent CT A/P 2/2 which showed large LUQ collection and multiple smaller intraabdominal collections s/p IR drainage 2/4 with placement of drain.  ?  Neurology:  -Tylenol & Oxy and Dilaudid PRN for pain  -d/c seroquil     Resp:  -CXR overloaded, unsure if patient's BPs will tolerate diuresis; will limit IVF (saline lock)   - 97% on 4L NC   ?  CV:  -goal MAP > 65  ?  GI:  - NPO, Tigertube  - Continue feeds, increase to goal   - Bowel regimen  - Increased secretions in drains green/brown in color, vac placed by plastics, monitor output   ?  Renal:  -  monitor lytes, replete as needed  - IVF on hold, will resuscitate as needed  - remove sinha   ?  Heme:  -monitor H/H, transfuse if <7   -ct lovenox vte ppx  ?  ID:  - Febrile, uptrending leukocytosis on vanco (2/3), Diflucan (2/3) --> micafungin (2/5), and meropenem (2/2)  - Vanco trough 2/5 pm 15, due 2/7 am  - U/A neg, blood cx neg 2/1  - c-diff negative  - continue to trend WBC+  - cx from drainage sent, only WBC no organisms   ?  Endo  - ISS A1C8.0   ?  Dispo: SICU  Critcal Care Diagnosis: Septic shock, respiratory abnormality, malnutrition, peritonitis, abdominal abscess

## 2019-02-07 NOTE — CHART NOTE - NSCHARTNOTEFT_GEN_A_CORE
NUTRITION FOLLOW-UP:    Pt seen for critical care nutrition follow/up.  Pt started on enteral nutrition support on 2/5 and Tiger Tube was placed 2/2 poor food intake.  Pt tolerating TF so far as per RN.    Pt continues with fluid retention and currently is with 2+generalized edema.      Weight:  2/7 - 63.5kg     2/4 - 62.3kg     2/1 - 61.8kg     Adm - 57.4kg  Labs:  H/H 8.9/27.6  Glu 136  T.Ca 7.8  Fs 132-144    Current Diet:  Glucerna 1.2 @53ml/h x18h via Tiger Tube  Enteral Recommendations:  Pt receiving TF via nasojejunal tube - suggest change to 24h schedule with Glucerna 1.2 @goal of 45ml/h to provide 1296 kcal w/64 gm protein (~22kcal/kg) and add 1 pack prosource/d to increase protein intake to 79gms/d( 1.4gm protein/kg) to meet current needs,  Discussed with SICU team.      RD to Remain Available:  yes    Additional Recommendations:   1) Monitor weights, labs, BM's, skin integrity, tolerance to TF, FS  2) Change to 24h/d schedule-adjust TF to meet current needs.   3)

## 2019-02-07 NOTE — PROGRESS NOTE ADULT - ASSESSMENT
54y Female PMHx HTN, T2DM, asthma, depression with recent diagnosis of invasive ampullary adenocarcinoma of the CBD admitted for cholangitis on 1/5, s/p ERCP 1/8, whipple 1/11, rapid response for GIB on floor 1/18 s/p MTP, RTOR s/p celiotomy, evacuation of hematoma, GDA bleed stopped and sutured, Abthera vac. Closure of abdomen with strattice mesh on 1/21, with inna drains and left lower JOURDAN drain in subcutaneous. Incisional bleed 1/23, bedside skin staples removed, JOURDAN drain removed, hemorrhage controlled, for continuation of ICU care. S/p interventional radiology drainage of LUQ collection 2/4    PLAN:  -pain control as needed  -cont lovenox for DVT ppx  -PT/oob  -cont NPO/IVF  -c/w tube feeds via tiger tube  -monitor VS  -c/w broad spectrum abx  -appreciate SICU care    D Team Surgery  [id number]

## 2019-02-07 NOTE — PROGRESS NOTE ADULT - SUBJECTIVE AND OBJECTIVE BOX
Plastic Surgery Progress Note     S: Patient seen and examined today. No acute events overnight. Vac placed yesterday evening. Dermaclose intact    MEDICATIONS  (STANDING):  artificial  tears Solution 1 Drop(s) Both EYES every 12 hours  chlorhexidine 4% Liquid 1 Application(s) Topical <User Schedule>  enoxaparin Injectable 40 milliGRAM(s) SubCutaneous daily  insulin lispro (HumaLOG) corrective regimen sliding scale   SubCutaneous every 6 hours  meropenem  IVPB 1000 milliGRAM(s) IV Intermittent every 8 hours  micafungin IVPB      micafungin IVPB 150 milliGRAM(s) IV Intermittent every 24 hours  pantoprazole   Suspension 40 milliGRAM(s) Oral daily  QUEtiapine 12.5 milliGRAM(s) Oral at bedtime  vancomycin  IVPB      vancomycin  IVPB 750 milliGRAM(s) IV Intermittent every 12 hours    MEDICATIONS  (PRN):  acetaminophen    Suspension .. 650 milliGRAM(s) Oral every 6 hours PRN Mild Pain (1 - 3)  oxyCODONE    Solution 5 milliGRAM(s) Oral every 4 hours PRN Moderate Pain (4 - 6)  oxyCODONE    Solution 10 milliGRAM(s) Oral every 4 hours PRN Severe Pain (7 - 10)      Physical Exam:    Vital Signs Last 24 Hrs  T(C): 37.1 (07 Feb 2019 08:00), Max: 38.7 (06 Feb 2019 16:00)  T(F): 98.8 (07 Feb 2019 08:00), Max: 101.6 (06 Feb 2019 16:00)  HR: 86 (07 Feb 2019 08:00) (86 - 110)  BP: --  BP(mean): --  RR: 14 (07 Feb 2019 08:00) (12 - 25)  SpO2: 98% (07 Feb 2019 08:00) (95% - 98%)    02-06-19 @ 07:01  -  02-07-19 @ 07:00  --------------------------------------------------------  IN: 1970 mL / OUT: 1170 mL / NET: 800 mL    02-07-19 @ 07:01  -  02-07-19 @ 08:38  --------------------------------------------------------  IN: 53 mL / OUT: 20 mL / NET: 33 mL        Abdomen with dermaclose intact  Vac dressing on moderate seal to continuous suction over dermaclose          LABS:                        8.9    16.98 )-----------( 245      ( 07 Feb 2019 02:20 )             27.6     02-07    138  |  103  |  13  ----------------------------<  136<H>  4.3   |  23  |  0.52    Ca    7.8<L>      07 Feb 2019 02:20  Phos  2.6     02-07  Mg     1.8     02-07    TPro  5.5<L>  /  Alb  1.9<L>  /  TBili  3.6<H>  /  DBili  2.7<H>  /  AST  43<H>  /  ALT  19  /  AlkPhos  357<H>  02-07

## 2019-02-07 NOTE — PROGRESS NOTE ADULT - SUBJECTIVE AND OBJECTIVE BOX
SICU Morning Progress Note     Interval/Overnight Events: Patient continues to be somnolent however, AOx3. Seroquel decreased from 25mg to 12.5 mg. Continued copious bilious drainage from midline wound. Wound vac placed late yesterday afternoon.    HPI: 54y Female PMHx HTN, T2DM, asthma, depression with recent diagnosis of invasive ampullary adenocarcinoma of the CBD admitted for cholangitis on 1/5, s/p ERCP 1/8, whipple 1/11, rapid response for GIB on floor 1/18 s/p MTP, RTOR s/p celiotomy, evacuation of hematoma, GDA bleed stopped and sutured, Abthera vac. Closure of abdomen with strattice mesh on 1/21, with inna drains and left lower JOURDAN drain in subcutaneous, s/p incisional bleed 1/23, bedside skin staples removed, JOURDAN drain removed, hemorrhage controlled, for continuation of ICU care for respiratory failure requiring intubation s/p extubation 1/29, underwent CT A/P 2/2 which showed large LUQ collection and multiple smaller intraabdominal collection s/p IR drainage 2/4      Allergies: No Known Allergies    MEDICATIONS:   Neurologic Medications  HYDROmorphone   oxyCODONE      QUEtiapine 25 milliGRAM(s) Oral at bedtime  Respiratory Medications  Cardiovascular Medications  norepinephrine Infusion 0.02 MICROgram(s)/kG/Min IV Continuous <Continuous>  Gastrointestinal Medications  pantoprazole  Injectable 40 milliGRAM(s) IV Push daily  Genitourinary Medications  Hematologic/Oncologic Medications  enoxaparin Injectable 40 milliGRAM(s) SubCutaneous daily  Antimicrobial/Immunologic Medications  meropenem  IVPB 1000 milliGRAM(s) IV Intermittent every 8 hours  micafungin IVPB      vancomycin  IVPB      vancomycin  IVPB 750 milliGRAM(s) IV Intermittent every 12 hours  Endocrine/Metabolic Medications  insulin lispro (HumaLOG) corrective regimen sliding scale   SubCutaneous every 6 hours  Topical/Other Medications  artificial  tears Solution 1 Drop(s) Both EYES every 12 hours  chlorhexidine 4% Liquid 1 Application(s) Topical <User Schedule>    VITAL SIGNS, INS/OUTS (last 24 hours):  T(C): 38.3 (02-05-19 @ 20:00), Max: 38.8 (02-05-19 @ 12:00)  HR: 100 (02-05-19 @ 23:00) (79 - 105)  BP: 135/58 (02-05-19 @ 21:19) (135/58 - 135/58)  BP(mean): 77 (02-05-19 @ 21:19) (77 - 77)  ABP: 143/64 (02-05-19 @ 23:00) (108/51 - 157/75)  ABP(mean): 95 (02-05-19 @ 23:00) (72 - 111)  RR: 16 (02-05-19 @ 23:00) (13 - 37)  SpO2: 99% (02-05-19 @ 23:00) (94% - 100%)  CAPILLARY BLOOD GLUCOSE  POCT Blood Glucose.: 112 mg/dL (05 Feb 2019 18:05)  POCT Blood Glucose.: 136 mg/dL (05 Feb 2019 12:56)  POCT Blood Glucose.: 131 mg/dL (05 Feb 2019 06:19)      02-05 @ 07:01  -  02-06 @ 00:12  --------------------------------------------------------  IN:    dextrose 5% + sodium chloride 0.45% with potassium chloride 20 mEq/L: 100 mL    Glucerna: 225 mL    IV PiggyBack: 150 mL  Total IN: 475 mL    OUT:    Bulb: 25 mL    Bulb: 185 mL    Indwelling Catheter - Urethral: 575 mL  Total OUT: 785 mL  Total NET: -310 mL    EXAM  NEUROLOGY   Exam: NAD, oriented x 3, no focal deficit, follows commands  HEENT  Exam: Normocephalic, atraumatic, EOMI.   RESPIRATORY  Exam: Lungs clear to auscultation, nonlabored breathing   CARDIOVASCULAR  Exam: S1, S2. Regular rate and rhythm.   GI/NUTRITION  Exam: Abdomen soft, mildly tender, midline dressing saturated with bilious fluid, skin open, wound has no surrounding erythema, R JOURDAN drain minimal bilious output left JOURDAN drain with bilious drainage, IR drain with serosang drainage.   VASCULAR  Exam: Extremities warm, pink, well-perfused.   MUSCULOSKELETAL  Exam: All extremities moving spontaneously without limitations.   SKIN  Exam: Good skin turgor, no skin breakdown.   HEMATOLOGIC [x] VTE Prophylaxis: enoxaparin Injectable 40 milliGRAM(s) SubCutaneous daily    Tubes/Lines/Drains   [x] Peripheral IV  [] Central Venous Line [] R [] L [] IJ [] Fem [] SC Date Placed:   [x] Arterial Line [] R [x] L [] Fem [] Rad [x] Ax Date Placed:   [] PICC [] Midline [] Mediport  [] Urinary Catheter Date Placed:   [x] Necessity of urinary, arterial, and venous catheters discussed    LABS        --------------------------------------------------------------------------------------    CT: IMPRESSION:   New diffuse peritoneal enhancement, compatible with peritonitis, more   extensive compared to 1/30/2019.  Multiple abdominal and pelvic fluid collections consistent with abscesses.  New small bowel wall may also edema and colonic wall thickening, likely   reactive.  Left upper quadrant collection measuring greater than fluid attenuation   is grossly unchanged from 1/30/2019.  Moderate left and small right pleural effusions with adjacent   atelectasis, slightly decreased from 1/30/2019.  Diffuse thickening of the colon consistent with colitis.

## 2019-02-07 NOTE — PROGRESS NOTE ADULT - ATTENDING COMMENTS
54y Female PMHx HTN, T2DM, asthma, depression with recent diagnosis of invasive ampullary adenocarcinoma of the CBD admitted for cholangitis on 1/5, s/p ERCP 1/8, whipple 1/11, rapid response for GIB on floor 1/18 s/p MTP, RTOR s/p celiotomy, evacuation of hematoma, GDA bleed stopped and sutured, Abthera vac. Closure of abdomen with strattice mesh on 1/21, with inna drains and left lower JOURDAN drain in subcutaneous. Incisional bleed 1/23, bedside skin staples removed, JOURDAN drain removed, hemorrhage controlled, respiratory failure s/p extubation 1/29, underwent CT A/P 2/2 which showed large LUQ collection and multiple smaller intraabdominal collections s/p IR drainage 2/4 with placement of drain.  ?  Neurology:  -Tylenol & Oxy and Dilaudid PRN for pain  -d/c seroquil     Resp:  -CXR overloaded, unsure if patient's BPs will tolerate diuresis; will limit IVF (saline lock)   - 97% on 4L NC   ?  CV:  -goal MAP > 65  ?  GI:  - NPO, Tigertube  - Continue feeds, increase to goal   - Bowel regimen  - Increased secretions in drains green/brown in color, vac placed by plastics, monitor output   ?  Renal:  -  monitor lytes, replete as needed  - IVF on hold, will resuscitate as needed  - remove sinha   ?  Heme:  -monitor H/H, transfuse if <7   -ct lovenox vte ppx  ?  ID:  - Febrile, uptrending leukocytosis on vanco (2/3), Diflucan (2/3) --> micafungin (2/5), and meropenem (2/2)  - Vanco trough 2/5 pm 15, due 2/7 am  - U/A neg, blood cx neg 2/1  - c-diff negative  - continue to trend WBC+  - cx from drainage sent, only WBC no organisms   ?  Endo  - ISS A1C8.0   ?  Dispo: SICU  Critcal Care Diagnosis: Septic shock, respiratory abnormality, malnutrition, peritonitis, abdominal abscess  The patient is a critical care patient with life threatening hemodynamic and metabolic instability in SICU.  I have personally interviewed when possible and examined the patient, reviewed data and laboratory tests/x-rays and all pertinent electronic images.  I was physically present for the key portions of the evaluation and management (E/M) service provided.   The SICU team has a constant risk benefit analyzes discussion with the primary team, all consultants, House Staff and PA's on all decisions.  These diagnoses are unrelated to the surgical procedure noted above.  I meet with family if needed to get further history, discuss the case and make care decisions for this patient who might not be able to participate.  Time involved in performance of separately billable procedures was not counted toward my critical care time. There is no overlap.  I spent 55-75 minutes of critical care time for the diagnoses, assessment, plan and interventions.

## 2019-02-07 NOTE — PROGRESS NOTE ADULT - SUBJECTIVE AND OBJECTIVE BOX
D Team Surgery    SUBJECTIVE: Pt seen and examined at bedside.  Pt on TF via tiger tube.  No bowel movements.  Pain adequately controlled.  Remains febrile.    OBJECTIVE: T(C): 38.6 (02-07-19 @ 04:00), Max: 38.7 (02-06-19 @ 16:00)  HR: 95 (02-07-19 @ 06:00) (95 - 110)  BP: --  RR: 14 (02-07-19 @ 06:00) (12 - 25)  SpO2: 97% (02-07-19 @ 06:00) (95% - 98%)  Wt(kg): --  I&O's Summary    05 Feb 2019 07:01  -  06 Feb 2019 07:00  --------------------------------------------------------  IN: 975 mL / OUT: 1120 mL / NET: -145 mL    06 Feb 2019 07:01  -  07 Feb 2019 06:49  --------------------------------------------------------  IN: 1970 mL / OUT: 1150 mL / NET: 820 mL      I&O's Detail    05 Feb 2019 07:01  -  06 Feb 2019 07:00  --------------------------------------------------------  IN:    dextrose 5% + sodium chloride 0.45% with potassium chloride 20 mEq/L: 100 mL    Glucerna: 425 mL    IV PiggyBack: 450 mL  Total IN: 975 mL    OUT:    Bulb: 35 mL    Bulb: 285 mL    Drain: 25 mL    Indwelling Catheter - Urethral: 775 mL  Total OUT: 1120 mL    Total NET: -145 mL      06 Feb 2019 07:01  -  07 Feb 2019 06:49  --------------------------------------------------------  IN:    Glucerna: 870 mL    IV PiggyBack: 1100 mL  Total IN: 1970 mL    OUT:    Bulb: 80 mL    Bulb: 90 mL    Drain: 20 mL    Indwelling Catheter - Urethral: 710 mL    VAC (Vacuum Assisted Closure) System: 250 mL  Total OUT: 1150 mL    Total NET: 820 mL        General: NAD  Abdomen: wound vac in place and suctioning, JPx2 murky, IR/ drain serous, abdominal binder in place, abd soft, NT, ND    MEDICATIONS  (STANDING):  artificial  tears Solution 1 Drop(s) Both EYES every 12 hours  chlorhexidine 4% Liquid 1 Application(s) Topical <User Schedule>  enoxaparin Injectable 40 milliGRAM(s) SubCutaneous daily  insulin lispro (HumaLOG) corrective regimen sliding scale   SubCutaneous every 6 hours  meropenem  IVPB 1000 milliGRAM(s) IV Intermittent every 8 hours  micafungin IVPB      micafungin IVPB 150 milliGRAM(s) IV Intermittent every 24 hours  pantoprazole   Suspension 40 milliGRAM(s) Oral daily  QUEtiapine 12.5 milliGRAM(s) Oral at bedtime  vancomycin  IVPB      vancomycin  IVPB 750 milliGRAM(s) IV Intermittent every 12 hours    MEDICATIONS  (PRN):  acetaminophen    Suspension .. 650 milliGRAM(s) Oral every 6 hours PRN Mild Pain (1 - 3)  oxyCODONE    Solution 5 milliGRAM(s) Oral every 4 hours PRN Moderate Pain (4 - 6)  oxyCODONE    Solution 10 milliGRAM(s) Oral every 4 hours PRN Severe Pain (7 - 10)      LABS:                        8.9    16.98 )-----------( 245      ( 07 Feb 2019 02:20 )             27.6     02-07    138  |  103  |  13  ----------------------------<  136<H>  4.3   |  23  |  0.52    Ca    7.8<L>      07 Feb 2019 02:20  Phos  2.6     02-07  Mg     1.8     02-07    TPro  5.5<L>  /  Alb  1.9<L>  /  TBili  3.6<H>  /  DBili  2.7<H>  /  AST  43<H>  /  ALT  19  /  AlkPhos  357<H>  02-07    PT/INR - ( 07 Feb 2019 02:20 )   PT: 18.5 SEC;   INR: 1.60          PTT - ( 07 Feb 2019 02:20 )  PTT:24.5 SEC

## 2019-02-08 LAB
ALBUMIN SERPL ELPH-MCNC: 1.9 G/DL — LOW (ref 3.3–5)
ALP SERPL-CCNC: 340 U/L — HIGH (ref 40–120)
ALT FLD-CCNC: 16 U/L — SIGNIFICANT CHANGE UP (ref 4–33)
ANION GAP SERPL CALC-SCNC: 12 MMO/L — SIGNIFICANT CHANGE UP (ref 7–14)
AST SERPL-CCNC: 38 U/L — HIGH (ref 4–32)
BASOPHILS # BLD AUTO: 0.09 K/UL — SIGNIFICANT CHANGE UP (ref 0–0.2)
BASOPHILS NFR BLD AUTO: 0.5 % — SIGNIFICANT CHANGE UP (ref 0–2)
BILIRUB DIRECT SERPL-MCNC: 2.2 MG/DL — HIGH (ref 0.1–0.2)
BILIRUB SERPL-MCNC: 3.1 MG/DL — HIGH (ref 0.2–1.2)
BUN SERPL-MCNC: 13 MG/DL — SIGNIFICANT CHANGE UP (ref 7–23)
CALCIUM SERPL-MCNC: 7.8 MG/DL — LOW (ref 8.4–10.5)
CHLORIDE SERPL-SCNC: 102 MMOL/L — SIGNIFICANT CHANGE UP (ref 98–107)
CO2 SERPL-SCNC: 24 MMOL/L — SIGNIFICANT CHANGE UP (ref 22–31)
CREAT SERPL-MCNC: 0.47 MG/DL — LOW (ref 0.5–1.3)
EOSINOPHIL # BLD AUTO: 0.45 K/UL — SIGNIFICANT CHANGE UP (ref 0–0.5)
EOSINOPHIL NFR BLD AUTO: 2.7 % — SIGNIFICANT CHANGE UP (ref 0–6)
GLUCOSE SERPL-MCNC: 124 MG/DL — HIGH (ref 70–99)
HCT VFR BLD CALC: 27.5 % — LOW (ref 34.5–45)
HGB BLD-MCNC: 8.8 G/DL — LOW (ref 11.5–15.5)
IMM GRANULOCYTES NFR BLD AUTO: 3.4 % — HIGH (ref 0–1.5)
LYMPHOCYTES # BLD AUTO: 12.8 % — LOW (ref 13–44)
LYMPHOCYTES # BLD AUTO: 2.14 K/UL — SIGNIFICANT CHANGE UP (ref 1–3.3)
MAGNESIUM SERPL-MCNC: 2 MG/DL — SIGNIFICANT CHANGE UP (ref 1.6–2.6)
MCHC RBC-ENTMCNC: 28.4 PG — SIGNIFICANT CHANGE UP (ref 27–34)
MCHC RBC-ENTMCNC: 32 % — SIGNIFICANT CHANGE UP (ref 32–36)
MCV RBC AUTO: 88.7 FL — SIGNIFICANT CHANGE UP (ref 80–100)
MONOCYTES # BLD AUTO: 1.86 K/UL — HIGH (ref 0–0.9)
MONOCYTES NFR BLD AUTO: 11.1 % — SIGNIFICANT CHANGE UP (ref 2–14)
NEUTROPHILS # BLD AUTO: 11.6 K/UL — HIGH (ref 1.8–7.4)
NEUTROPHILS NFR BLD AUTO: 69.5 % — SIGNIFICANT CHANGE UP (ref 43–77)
NRBC # FLD: 0 K/UL — LOW (ref 25–125)
PHOSPHATE SERPL-MCNC: 2.5 MG/DL — SIGNIFICANT CHANGE UP (ref 2.5–4.5)
PLATELET # BLD AUTO: 251 K/UL — SIGNIFICANT CHANGE UP (ref 150–400)
PMV BLD: 10.7 FL — SIGNIFICANT CHANGE UP (ref 7–13)
POTASSIUM SERPL-MCNC: 4.1 MMOL/L — SIGNIFICANT CHANGE UP (ref 3.5–5.3)
POTASSIUM SERPL-SCNC: 4.1 MMOL/L — SIGNIFICANT CHANGE UP (ref 3.5–5.3)
PROT SERPL-MCNC: 5.8 G/DL — LOW (ref 6–8.3)
RBC # BLD: 3.1 M/UL — LOW (ref 3.8–5.2)
RBC # FLD: 17.5 % — HIGH (ref 10.3–14.5)
SODIUM SERPL-SCNC: 138 MMOL/L — SIGNIFICANT CHANGE UP (ref 135–145)
WBC # BLD: 16.71 K/UL — HIGH (ref 3.8–10.5)
WBC # FLD AUTO: 16.71 K/UL — HIGH (ref 3.8–10.5)

## 2019-02-08 PROCEDURE — 71045 X-RAY EXAM CHEST 1 VIEW: CPT | Mod: 26

## 2019-02-08 PROCEDURE — 99233 SBSQ HOSP IP/OBS HIGH 50: CPT | Mod: GC

## 2019-02-08 RX ORDER — OCTREOTIDE ACETATE 200 UG/ML
100 INJECTION, SOLUTION INTRAVENOUS; SUBCUTANEOUS EVERY 8 HOURS
Qty: 0 | Refills: 0 | Status: DISCONTINUED | OUTPATIENT
Start: 2019-02-08 | End: 2019-02-26

## 2019-02-08 RX ORDER — FUROSEMIDE 40 MG
20 TABLET ORAL ONCE
Qty: 0 | Refills: 0 | Status: COMPLETED | OUTPATIENT
Start: 2019-02-08 | End: 2019-02-08

## 2019-02-08 RX ORDER — ACETAMINOPHEN 500 MG
650 TABLET ORAL EVERY 6 HOURS
Qty: 0 | Refills: 0 | Status: DISCONTINUED | OUTPATIENT
Start: 2019-02-08 | End: 2019-02-13

## 2019-02-08 RX ORDER — FUROSEMIDE 40 MG
20 TABLET ORAL DAILY
Qty: 0 | Refills: 0 | Status: DISCONTINUED | OUTPATIENT
Start: 2019-02-08 | End: 2019-02-08

## 2019-02-08 RX ADMIN — OXYCODONE HYDROCHLORIDE 10 MILLIGRAM(S): 5 TABLET ORAL at 11:20

## 2019-02-08 RX ADMIN — OXYCODONE HYDROCHLORIDE 5 MILLIGRAM(S): 5 TABLET ORAL at 05:05

## 2019-02-08 RX ADMIN — OXYCODONE HYDROCHLORIDE 10 MILLIGRAM(S): 5 TABLET ORAL at 15:56

## 2019-02-08 RX ADMIN — OXYCODONE HYDROCHLORIDE 10 MILLIGRAM(S): 5 TABLET ORAL at 16:31

## 2019-02-08 RX ADMIN — OCTREOTIDE ACETATE 100 MICROGRAM(S): 200 INJECTION, SOLUTION INTRAVENOUS; SUBCUTANEOUS at 22:17

## 2019-02-08 RX ADMIN — MICAFUNGIN SODIUM 107.5 MILLIGRAM(S): 100 INJECTION, POWDER, LYOPHILIZED, FOR SOLUTION INTRAVENOUS at 11:24

## 2019-02-08 RX ADMIN — Medication 63.75 MILLIMOLE(S): at 05:45

## 2019-02-08 RX ADMIN — OCTREOTIDE ACETATE 100 MICROGRAM(S): 200 INJECTION, SOLUTION INTRAVENOUS; SUBCUTANEOUS at 15:29

## 2019-02-08 RX ADMIN — OXYCODONE HYDROCHLORIDE 5 MILLIGRAM(S): 5 TABLET ORAL at 04:32

## 2019-02-08 RX ADMIN — Medication 250 MILLIGRAM(S): at 18:36

## 2019-02-08 RX ADMIN — Medication 650 MILLIGRAM(S): at 08:26

## 2019-02-08 RX ADMIN — Medication 650 MILLIGRAM(S): at 23:41

## 2019-02-08 RX ADMIN — Medication 1 DROP(S): at 05:45

## 2019-02-08 RX ADMIN — Medication 20 MILLIGRAM(S): at 18:36

## 2019-02-08 RX ADMIN — Medication 650 MILLIGRAM(S): at 00:26

## 2019-02-08 RX ADMIN — MEROPENEM 100 MILLIGRAM(S): 1 INJECTION INTRAVENOUS at 22:17

## 2019-02-08 RX ADMIN — PANTOPRAZOLE SODIUM 40 MILLIGRAM(S): 20 TABLET, DELAYED RELEASE ORAL at 11:20

## 2019-02-08 RX ADMIN — ENOXAPARIN SODIUM 40 MILLIGRAM(S): 100 INJECTION SUBCUTANEOUS at 11:21

## 2019-02-08 RX ADMIN — Medication 650 MILLIGRAM(S): at 01:00

## 2019-02-08 RX ADMIN — Medication 650 MILLIGRAM(S): at 09:00

## 2019-02-08 RX ADMIN — OXYCODONE HYDROCHLORIDE 10 MILLIGRAM(S): 5 TABLET ORAL at 11:50

## 2019-02-08 RX ADMIN — MEROPENEM 100 MILLIGRAM(S): 1 INJECTION INTRAVENOUS at 15:29

## 2019-02-08 RX ADMIN — Medication 1 DROP(S): at 18:35

## 2019-02-08 RX ADMIN — Medication 650 MILLIGRAM(S): at 16:31

## 2019-02-08 RX ADMIN — CHLORHEXIDINE GLUCONATE 1 APPLICATION(S): 213 SOLUTION TOPICAL at 05:47

## 2019-02-08 RX ADMIN — Medication 250 MILLIGRAM(S): at 05:45

## 2019-02-08 RX ADMIN — Medication 650 MILLIGRAM(S): at 15:57

## 2019-02-08 RX ADMIN — MEROPENEM 100 MILLIGRAM(S): 1 INJECTION INTRAVENOUS at 05:45

## 2019-02-08 RX ADMIN — Medication 20 MILLIGRAM(S): at 10:54

## 2019-02-08 NOTE — PROGRESS NOTE ADULT - ASSESSMENT
ASSESSMENT:  54y Female PMHx HTN, T2DM, asthma, depression with recent diagnosis of invasive ampullary adenocarcinoma of the CBD admitted for cholangitis on 1/5, s/p ERCP 1/8, whipple 1/11, rapid response for GIB on floor 1/18 s/p MTP, RTOR s/p celiotomy, evacuation of hematoma, GDA bleed stopped and sutured, Abthera vac. Closure of abdomen with strattice mesh on 1/21, with inna drains and left lower JOURDAN drain in subcutaneous. Incisional bleed 1/23, bedside skin staples removed, JOURDAN drain removed, hemorrhage controlled, respiratory failure s/p extubation 1/29, underwent CT A/P 2/2 which showed large LUQ collection and multiple smaller intraabdominal collections s/p IR drainage 2/4 with placement of drain, now with abdominal VAC in place  ?  Neurology:  -Tylenol & Oxy and Dilaudid PRN for pain  -stable     Resp:  -CXR overloaded, unsure if patient's BPs will tolerate diuresis; will limit IVF (saline lock)   - likely non cardiogenic edema   - 97% on 2L NC   - OOB, pulmonary toilet   ?  CV:  - goal MAP > 65  - likely hypervolemic - stable  ?  GI:  - NPO, Tigertube  - Continue feeds at goal  - Bowel regimen  - Increased secretions in drains green/brown in color, vac placed by plastics, monitor output   - octreotide added for increased bilious output by drain   - protonix (home med)   - LFTs trending down   ?  Renal:  -  monitor lytes, replete as needed  - IVF on hold, will resuscitate as needed  - start diuresis,  keep net negative 2L  - give lasix 20 now   ?  Heme:  -monitor H/H, transfuse if <7   -ct lovenox vte ppx  ?  ID:  - Febrile, uptrending leukocytosis on vanco (2/3), Diflucan (2/3) --> micafungin (2/5), and meropenem (2/2)  - likely bile leaking causing pancreatitis vs peritonitis   - Vanco trough 2/5 pm 15, due 2/7 18.5  - U/A neg, blood cx neg 2/1  - c-diff negative  - continue to trend WBC+  - cx from drainage sent, only WBC no organisms   - need to rescan abdomen?   Endo  - ISS A1C8.0   ?  Dispo: SICU    Critcal Care Diagnosis: Septic shock, respiratory abnormality, malnutrition, peritonitis, abdominal abscess

## 2019-02-08 NOTE — PROGRESS NOTE ADULT - SUBJECTIVE AND OBJECTIVE BOX
Plastic Surgery Progress Note       S: Patient seen and examined today. No acute events overnight.    MEDICATIONS  (STANDING):  artificial  tears Solution 1 Drop(s) Both EYES every 12 hours  chlorhexidine 4% Liquid 1 Application(s) Topical <User Schedule>  enoxaparin Injectable 40 milliGRAM(s) SubCutaneous daily  insulin lispro (HumaLOG) corrective regimen sliding scale   SubCutaneous every 6 hours  meropenem  IVPB 1000 milliGRAM(s) IV Intermittent every 8 hours  micafungin IVPB      micafungin IVPB 150 milliGRAM(s) IV Intermittent every 24 hours  octreotide  Injectable 100 MICROGram(s) SubCutaneous every 8 hours  pantoprazole   Suspension 40 milliGRAM(s) Oral daily  vancomycin  IVPB      vancomycin  IVPB 750 milliGRAM(s) IV Intermittent every 12 hours    MEDICATIONS  (PRN):  acetaminophen    Suspension .. 650 milliGRAM(s) Oral every 6 hours PRN Mild Pain (1 - 3)  oxyCODONE    Solution 5 milliGRAM(s) Oral every 4 hours PRN Moderate Pain (4 - 6)  oxyCODONE    Solution 10 milliGRAM(s) Oral every 4 hours PRN Severe Pain (7 - 10)      Physical Exam:    Vital Signs Last 24 Hrs  T(C): 37.7 (08 Feb 2019 05:00), Max: 38.7 (07 Feb 2019 15:15)  T(F): 99.9 (08 Feb 2019 05:00), Max: 101.6 (07 Feb 2019 15:15)  HR: 101 (08 Feb 2019 06:00) (86 - 107)  BP: --  BP(mean): --  RR: 18 (08 Feb 2019 06:00) (14 - 38)  SpO2: 97% (08 Feb 2019 06:00) (95% - 99%)    02-07-19 @ 07:01  -  02-08-19 @ 07:00  --------------------------------------------------------  IN: 2197 mL / OUT: 1010 mL / NET: 1187 mL        Abdomen with dermaclose intact  Vac dressing on moderate seal to continuous suction over dermaclose      LABS:                        8.8    16.71 )-----------( 251      ( 08 Feb 2019 03:00 )             27.5     02-08    138  |  102  |  13  ----------------------------<  124<H>  4.1   |  24  |  0.47<L>    Ca    7.8<L>      08 Feb 2019 03:00  Phos  2.5     02-08  Mg     2.0     02-08    TPro  5.8<L>  /  Alb  1.9<L>  /  TBili  3.1<H>  /  DBili  2.2<H>  /  AST  38<H>  /  ALT  16  /  AlkPhos  340<H>  02-08

## 2019-02-08 NOTE — PROGRESS NOTE ADULT - SUBJECTIVE AND OBJECTIVE BOX
SICU Daily Progress Note  =====================================================  Interval/Overnight Events: Pt continues to be somnolent however, AOx3.       HPI: 54y Female PMHx HTN, T2DM, asthma, depression with recent diagnosis of invasive ampullary adenocarcinoma of the CBD admitted for cholangitis on 1/5, s/p ERCP 1/8, whipple 1/11, rapid response for GIB on floor 1/18 s/p MTP, RTOR s/p celiotomy, evacuation of hematoma, GDA bleed stopped and sutured, Abthera vac. Closure of abdomen with strattice mesh on 1/21, with inna drains and left lower JOURDAN drain in subcutaneous, s/p incisional bleed 1/23, bedside skin staples removed, JOURDAN drain removed, hemorrhage controlled, for continuation of ICU care for respiratory failure requiring intubation s/p extubation 1/29, underwent CT A/P 2/2 which showed large LUQ collection and multiple smaller intraabdominal collection s/p IR drainage 2/4    Allergies: No Known Allergies    MEDICATIONS:   --------------------------------------------------------------------------------------  Neurologic Medications  acetaminophen    Suspension .. 650 milliGRAM(s) Oral every 6 hours PRN Mild Pain (1 - 3)  oxyCODONE    Solution 5 milliGRAM(s) Oral every 4 hours PRN Moderate Pain (4 - 6)  oxyCODONE    Solution 10 milliGRAM(s) Oral every 4 hours PRN Severe Pain (7 - 10)    Respiratory Medications    Cardiovascular Medications    Gastrointestinal Medications  pantoprazole   Suspension 40 milliGRAM(s) Oral daily  sodium phosphate IVPB 15 milliMole(s) IV Intermittent once    Genitourinary Medications    Hematologic/Oncologic Medications  enoxaparin Injectable 40 milliGRAM(s) SubCutaneous daily    Antimicrobial/Immunologic Medications  meropenem  IVPB 1000 milliGRAM(s) IV Intermittent every 8 hours  micafungin IVPB      micafungin IVPB 150 milliGRAM(s) IV Intermittent every 24 hours  vancomycin  IVPB      vancomycin  IVPB 750 milliGRAM(s) IV Intermittent every 12 hours    Endocrine/Metabolic Medications  insulin lispro (HumaLOG) corrective regimen sliding scale   SubCutaneous every 6 hours    Topical/Other Medications  artificial  tears Solution 1 Drop(s) Both EYES every 12 hours  chlorhexidine 4% Liquid 1 Application(s) Topical <User Schedule>    --------------------------------------------------------------------------------------  ICU Vital Signs Last 24 Hrs  T(C): 38.4 (08 Feb 2019 00:00), Max: 38.7 (07 Feb 2019 15:15)  T(F): 101.1 (08 Feb 2019 00:00), Max: 101.6 (07 Feb 2019 15:15)  HR: 96 (08 Feb 2019 04:00) (86 - 107)  BP: --  BP(mean): --  ABP: 145/68 (08 Feb 2019 04:00) (100/45 - 159/75)  ABP(mean): 99 (08 Feb 2019 04:00) (65 - 110)  RR: 16 (08 Feb 2019 04:00) (14 - 38)  SpO2: 96% (08 Feb 2019 04:00) (95% - 99%)    --------------------------------------------------------------------------------------  I&O's Detail    06 Feb 2019 07:01  -  07 Feb 2019 07:00  --------------------------------------------------------  IN:    Glucerna: 870 mL    IV PiggyBack: 1100 mL  Total IN: 1970 mL    OUT:    Bulb: 80 mL    Bulb: 90 mL    Drain: 20 mL    Indwelling Catheter - Urethral: 730 mL    VAC (Vacuum Assisted Closure) System: 250 mL  Total OUT: 1170 mL    Total NET: 800 mL      07 Feb 2019 07:01  -  08 Feb 2019 05:19  --------------------------------------------------------  IN:    Enteral Tube Flush: 130 mL    Glucerna: 1022 mL    IV PiggyBack: 450 mL  Total IN: 1602 mL    OUT:    Bulb: 100 mL    Bulb: 100 mL    Drain: 5 mL    Indwelling Catheter - Urethral: 200 mL    VAC (Vacuum Assisted Closure) System: 150 mL    Voided: 200 mL  Total OUT: 755 mL    Total NET: 847 mL    --------------------------------------------------------------------------------------    EXAM  NEUROLOGY  NAD, oriented x 3, no focal deficit, follows commands     RESPIRATORY  Exam: Lungs clear to auscultation, nonlabored breathing     CARDIOVASCULAR  Exam: S1, S2. Regular rate and rhythm.     GI/NUTRITION  Exam: Abdomen soft, mildly tender, midline with VAC dressing in place, R JOURDAN drain minimal bilious output, left JOURDAN drain with bilious drainage, IR drain with serosanguinous drainage.     VASCULAR  Exam: Extremities warm, pink, well-perfused.    MUSCULOSKELETAL  Exam: All extremities moving spontaneously without limitations.     SKIN  Exam: Good skin turgor, no skin breakdown.     METABOLIC/FLUIDS/ELECTROLYTES  sodium phosphate IVPB 15 milliMole(s) IV Intermittent once      HEMATOLOGIC  [x] VTE Prophylaxis: enoxaparin Injectable 40 milliGRAM(s) SubCutaneous daily    Transfusions:	[] PRBC	[] Platelets		[] FFP	[] Cryoprecipitate    INFECTIOUS DISEASE  Antimicrobials/Immunologic Medications:  meropenem  IVPB 1000 milliGRAM(s) IV Intermittent every 8 hours  micafungin IVPB      micafungin IVPB 150 milliGRAM(s) IV Intermittent every 24 hours  vancomycin  IVPB      vancomycin  IVPB 750 milliGRAM(s) IV Intermittent every 12 hours    Tubes/Lines/Drains  JOURDAN X 2, IR drain, VAC dressing  [x] Peripheral IV  [x] Arterial Line		[] R	[] L	[] Fem	[] Rad	[x] Ax	Date Placed:   [x] Urinary Catheter		  [x] Necessity of urinary, arterial, and venous catheters discussed    LABS  --------------------------------------------------------------------------------------  CBC (02-08 @ 03:00)                              8.8<L>                         16.71<H>  )----------------(  251        69.5  % Neutrophils, 12.8<L>% Lymphocytes, ANC: 11.60<H>                              27.5<L>  CBC (02-07 @ 02:20)                              8.9<L>                         16.98<H>  )----------------(  245        70.4  % Neutrophils, 11.0<L>% Lymphocytes, ANC: 11.95<H>                              27.6<L>    BMP (02-08 @ 03:00)             138     |  102     |  13    		Ca++ --      Ca 7.8<L>             ---------------------------------( 124<H>		Mg 2.0                4.1     |  24      |  0.47<L>			Ph 2.5     BMP (02-07 @ 02:20)             138     |  103     |  13    		Ca++ --      Ca 7.8<L>             ---------------------------------( 136<H>		Mg 1.8                4.3     |  23      |  0.52  			Ph 2.6       LFTs (02-08 @ 03:00)      TPro 5.8<L> / Alb 1.9<L> / TBili 3.1<H> / DBili 2.2<H> / AST 38<H> / ALT 16 / AlkPhos 340<H>  LFTs (02-07 @ 02:20)      TPro 5.5<L> / Alb 1.9<L> / TBili 3.6<H> / DBili 2.7<H> / AST 43<H> / ALT 19 / AlkPhos 357<H>    Coags (02-07 @ 02:20)  aPTT 24.5<L> / INR 1.60<H> / PT 18.5<H>      --------------------------------------------------------------------------------------    OTHER LABORATORY:     IMAGING STUDIES:   CXR:     ASSESSMENT:  54y Female PMHx HTN, T2DM, asthma, depression with recent diagnosis of invasive ampullary adenocarcinoma of the CBD admitted for cholangitis on 1/5, s/p ERCP 1/8, whipple 1/11, rapid response for GIB on floor 1/18 s/p MTP, RTOR s/p celiotomy, evacuation of hematoma, GDA bleed stopped and sutured, Abthera vac. Closure of abdomen with strattice mesh on 1/21, with inna drains and left lower JOURDAN drain in subcutaneous. Incisional bleed 1/23, bedside skin staples removed, JOURDAN drain removed, hemorrhage controlled, respiratory failure s/p extubation 1/29, underwent CT A/P 2/2 which showed large LUQ collection and multiple smaller intraabdominal collections s/p IR drainage 2/4 with placement of drain, now with abdominal VAC in place  ?  Neurology:  -Tylenol & Oxy and Dilaudid PRN for pain  -seroquel d/c'ed    Resp:  -CXR overloaded, unsure if patient's BPs will tolerate diuresis; will limit IVF (saline lock)   - 97% on 4L NC   ?  CV:  -goal MAP > 65  ?  GI:  - NPO, Tigertube  - Continue feeds, increase to goal   - Bowel regimen  - Increased secretions in drains green/brown in color, vac placed by plastics, monitor output   ?  Renal:  -  monitor lytes, replete as needed  - IVF on hold, will resuscitate as needed  - sinha d/c'ed  ?  Heme:  -monitor H/H, transfuse if <7   -ct lovenox vte ppx  ?  ID:  - Febrile, uptrending leukocytosis on vanco (2/3), Diflucan (2/3) --> micafungin (2/5), and meropenem (2/2)  - Vanco trough 2/5 pm 15, due 2/7 18.5  - U/A neg, blood cx neg 2/1  - c-diff negative  - continue to trend WBC+  - cx from drainage sent, only WBC no organisms   ?  Endo  - ISS A1C8.0   ?  Dispo: SICU    Critcal Care Diagnosis: Septic shock, respiratory abnormality, malnutrition, peritonitis, abdominal abscess SICU Daily Progress Note  =====================================================  Interval/Overnight Events: Pt continues to be somnolent however, AOx3.       HPI: 54y Female PMHx HTN, T2DM, asthma, depression with recent diagnosis of invasive ampullary adenocarcinoma of the CBD admitted for cholangitis on 1/5, s/p ERCP 1/8, whipple 1/11, rapid response for GIB on floor 1/18 s/p MTP, RTOR s/p celiotomy, evacuation of hematoma, GDA bleed stopped and sutured, Abthera vac. Closure of abdomen with strattice mesh on 1/21, with inna drains and left lower JOURDAN drain in subcutaneous, s/p incisional bleed 1/23, bedside skin staples removed, JOURDAN drain removed, hemorrhage controlled, for continuation of ICU care for respiratory failure requiring intubation s/p extubation 1/29, underwent CT A/P 2/2 which showed large LUQ collection and multiple smaller intraabdominal collection s/p IR drainage 2/4    Allergies: No Known Allergies    MEDICATIONS:   --------------------------------------------------------------------------------------  Neurologic Medications  acetaminophen    Suspension .. 650 milliGRAM(s) Oral every 6 hours PRN Mild Pain (1 - 3)  oxyCODONE    Solution 5 milliGRAM(s) Oral every 4 hours PRN Moderate Pain (4 - 6)  oxyCODONE    Solution 10 milliGRAM(s) Oral every 4 hours PRN Severe Pain (7 - 10)    Respiratory Medications    Cardiovascular Medications    Gastrointestinal Medications  pantoprazole   Suspension 40 milliGRAM(s) Oral daily  sodium phosphate IVPB 15 milliMole(s) IV Intermittent once    Genitourinary Medications    Hematologic/Oncologic Medications  enoxaparin Injectable 40 milliGRAM(s) SubCutaneous daily    Antimicrobial/Immunologic Medications  meropenem  IVPB 1000 milliGRAM(s) IV Intermittent every 8 hours  micafungin IVPB      micafungin IVPB 150 milliGRAM(s) IV Intermittent every 24 hours  vancomycin  IVPB      vancomycin  IVPB 750 milliGRAM(s) IV Intermittent every 12 hours    Endocrine/Metabolic Medications  insulin lispro (HumaLOG) corrective regimen sliding scale   SubCutaneous every 6 hours    Topical/Other Medications  artificial  tears Solution 1 Drop(s) Both EYES every 12 hours  chlorhexidine 4% Liquid 1 Application(s) Topical <User Schedule>    --------------------------------------------------------------------------------------  ICU Vital Signs Last 24 Hrs  T(C): 38.4 (08 Feb 2019 00:00), Max: 38.7 (07 Feb 2019 15:15)  T(F): 101.1 (08 Feb 2019 00:00), Max: 101.6 (07 Feb 2019 15:15)  HR: 96 (08 Feb 2019 04:00) (86 - 107)  BP: --  BP(mean): --  ABP: 145/68 (08 Feb 2019 04:00) (100/45 - 159/75)  ABP(mean): 99 (08 Feb 2019 04:00) (65 - 110)  RR: 16 (08 Feb 2019 04:00) (14 - 38)  SpO2: 96% (08 Feb 2019 04:00) (95% - 99%)    --------------------------------------------------------------------------------------  I&O's Detail    06 Feb 2019 07:01  -  07 Feb 2019 07:00  --------------------------------------------------------  IN:    Glucerna: 870 mL    IV PiggyBack: 1100 mL  Total IN: 1970 mL    OUT:    Bulb: 80 mL    Bulb: 90 mL    Drain: 20 mL    Indwelling Catheter - Urethral: 730 mL    VAC (Vacuum Assisted Closure) System: 250 mL  Total OUT: 1170 mL    Total NET: 800 mL      07 Feb 2019 07:01  -  08 Feb 2019 05:19  --------------------------------------------------------  IN:    Enteral Tube Flush: 130 mL    Glucerna: 1022 mL    IV PiggyBack: 450 mL  Total IN: 1602 mL    OUT:    Bulb: 100 mL    Bulb: 100 mL    Drain: 5 mL    Indwelling Catheter - Urethral: 200 mL    VAC (Vacuum Assisted Closure) System: 150 mL    Voided: 200 mL  Total OUT: 755 mL    Total NET: 847 mL    --------------------------------------------------------------------------------------    EXAM  NEUROLOGY  NAD, oriented x 3, no focal deficit, follows commands     RESPIRATORY  Exam: Lungs clear to auscultation, nonlabored breathing     CARDIOVASCULAR  Exam: S1, S2. Regular rate and rhythm.     GI/NUTRITION  Exam: Abdomen soft, mildly tender, midline with VAC dressing in place, R JOURDAN drain minimal bilious output, left JOURDAN drain with bilious drainage, IR drain with serosanguinous drainage.     VASCULAR  Exam: Extremities warm, pink, well-perfused.    MUSCULOSKELETAL  Exam: All extremities moving spontaneously without limitations.     SKIN  Exam: Good skin turgor, no skin breakdown.     METABOLIC/FLUIDS/ELECTROLYTES  sodium phosphate IVPB 15 milliMole(s) IV Intermittent once      HEMATOLOGIC  [x] VTE Prophylaxis: enoxaparin Injectable 40 milliGRAM(s) SubCutaneous daily    Transfusions:	[] PRBC	[] Platelets		[] FFP	[] Cryoprecipitate    INFECTIOUS DISEASE  Antimicrobials/Immunologic Medications:  meropenem  IVPB 1000 milliGRAM(s) IV Intermittent every 8 hours  micafungin IVPB      micafungin IVPB 150 milliGRAM(s) IV Intermittent every 24 hours  vancomycin  IVPB      vancomycin  IVPB 750 milliGRAM(s) IV Intermittent every 12 hours    Tubes/Lines/Drains  JOURDAN X 2, IR drain, VAC dressing  [x] Peripheral IV  [x] Arterial Line		[] R	[] L	[] Fem	[] Rad	[x] Ax	Date Placed:   [x] Urinary Catheter		  [x] Necessity of urinary, arterial, and venous catheters discussed    LABS  --------------------------------------------------------------------------------------  CBC (02-08 @ 03:00)                              8.8<L>                         16.71<H>  )----------------(  251        69.5  % Neutrophils, 12.8<L>% Lymphocytes, ANC: 11.60<H>                              27.5<L>  CBC (02-07 @ 02:20)                              8.9<L>                         16.98<H>  )----------------(  245        70.4  % Neutrophils, 11.0<L>% Lymphocytes, ANC: 11.95<H>                              27.6<L>    BMP (02-08 @ 03:00)             138     |  102     |  13    		Ca++ --      Ca 7.8<L>             ---------------------------------( 124<H>		Mg 2.0                4.1     |  24      |  0.47<L>			Ph 2.5     BMP (02-07 @ 02:20)             138     |  103     |  13    		Ca++ --      Ca 7.8<L>             ---------------------------------( 136<H>		Mg 1.8                4.3     |  23      |  0.52  			Ph 2.6       LFTs (02-08 @ 03:00)      TPro 5.8<L> / Alb 1.9<L> / TBili 3.1<H> / DBili 2.2<H> / AST 38<H> / ALT 16 / AlkPhos 340<H>  LFTs (02-07 @ 02:20)      TPro 5.5<L> / Alb 1.9<L> / TBili 3.6<H> / DBili 2.7<H> / AST 43<H> / ALT 19 / AlkPhos 357<H>    Coags (02-07 @ 02:20)  aPTT 24.5<L> / INR 1.60<H> / PT 18.5<H>      --------------------------------------------------------------------------------------    OTHER LABORATORY:     IMAGING STUDIES:   CXR:

## 2019-02-08 NOTE — PROGRESS NOTE ADULT - ASSESSMENT
54F with history of cholangitis found to have CBD adenocarcinoma s/p Whipple procedure (on 1/11/2019) c/b GI bleed s/p RTOR for ligation (on 1/18/2019) followed by washout and placement of Strattice mesh (1/21/2019) s/p application of external tissue expander device (on 1/24/2019), s/p wound vac application (2/6/19)  - External tissue expander device to remain in place until sufficient skin laxity to allow for skin closure. Do not remove or otherwise touch this device. It will automatically tighten on its own without external intervention. It will also maintain appropriate tension on the wound over time.   - wound vac changes every 2-3 days ( we will plan to change tomorrow)  - Nutritional optimization.  - Medical optimization.  - Care per primary    Plastic Surgery [id number]

## 2019-02-08 NOTE — PROGRESS NOTE ADULT - SUBJECTIVE AND OBJECTIVE BOX
D Team Surgery    SUBJECTIVE: Pt seen and examined at bedside.  +/+ GI function.  Tolerating tiger tube feeds.  Remains febrile.   No acute complaints at this time.  Voiding.        OBJECTIVE: T(C): 37.7 (02-08-19 @ 05:00), Max: 38.7 (02-07-19 @ 15:15)  HR: 101 (02-08-19 @ 06:00) (86 - 107)  BP: --  RR: 18 (02-08-19 @ 06:00) (14 - 38)  SpO2: 97% (02-08-19 @ 06:00) (95% - 99%)  Wt(kg): --  I&O's Summary    06 Feb 2019 07:01  -  07 Feb 2019 07:00  --------------------------------------------------------  IN: 1970 mL / OUT: 1170 mL / NET: 800 mL    07 Feb 2019 07:01  -  08 Feb 2019 06:45  --------------------------------------------------------  IN: 2197 mL / OUT: 1010 mL / NET: 1187 mL      I&O's Detail    06 Feb 2019 07:01  -  07 Feb 2019 07:00  --------------------------------------------------------  IN:    Glucerna: 870 mL    IV PiggyBack: 1100 mL  Total IN: 1970 mL    OUT:    Bulb: 80 mL    Bulb: 90 mL    Drain: 20 mL    Indwelling Catheter - Urethral: 730 mL    VAC (Vacuum Assisted Closure) System: 250 mL  Total OUT: 1170 mL    Total NET: 800 mL      07 Feb 2019 07:01  -  08 Feb 2019 06:45  --------------------------------------------------------  IN:    Enteral Tube Flush: 130 mL    Glucerna: 1067 mL    IV PiggyBack: 1000 mL  Total IN: 2197 mL    OUT:    Bulb: 125 mL    Bulb: 125 mL    Drain: 10 mL    Indwelling Catheter - Urethral: 200 mL    VAC (Vacuum Assisted Closure) System: 350 mL    Voided: 200 mL  Total OUT: 1010 mL    Total NET: 1187 mL        General: NAD, A&O x 3  Abdomen: soft/NT/ND, wound vac and tissue expander to midline wound, ir drain serosang, JPx2 murky    MEDICATIONS  (STANDING):  artificial  tears Solution 1 Drop(s) Both EYES every 12 hours  chlorhexidine 4% Liquid 1 Application(s) Topical <User Schedule>  enoxaparin Injectable 40 milliGRAM(s) SubCutaneous daily  insulin lispro (HumaLOG) corrective regimen sliding scale   SubCutaneous every 6 hours  meropenem  IVPB 1000 milliGRAM(s) IV Intermittent every 8 hours  micafungin IVPB      micafungin IVPB 150 milliGRAM(s) IV Intermittent every 24 hours  pantoprazole   Suspension 40 milliGRAM(s) Oral daily  vancomycin  IVPB      vancomycin  IVPB 750 milliGRAM(s) IV Intermittent every 12 hours    MEDICATIONS  (PRN):  acetaminophen    Suspension .. 650 milliGRAM(s) Oral every 6 hours PRN Mild Pain (1 - 3)  oxyCODONE    Solution 5 milliGRAM(s) Oral every 4 hours PRN Moderate Pain (4 - 6)  oxyCODONE    Solution 10 milliGRAM(s) Oral every 4 hours PRN Severe Pain (7 - 10)      LABS:                        8.8    16.71 )-----------( 251      ( 08 Feb 2019 03:00 )             27.5     02-08    138  |  102  |  13  ----------------------------<  124<H>  4.1   |  24  |  0.47<L>    Ca    7.8<L>      08 Feb 2019 03:00  Phos  2.5     02-08  Mg     2.0     02-08    TPro  5.8<L>  /  Alb  1.9<L>  /  TBili  3.1<H>  /  DBili  2.2<H>  /  AST  38<H>  /  ALT  16  /  AlkPhos  340<H>  02-08    PT/INR - ( 07 Feb 2019 02:20 )   PT: 18.5 SEC;   INR: 1.60          PTT - ( 07 Feb 2019 02:20 )  PTT:24.5 SEC      RADIOLOGY & ADDITIONAL STUDIES:    Assessment/Plan: 54yFemale s/p

## 2019-02-08 NOTE — PROGRESS NOTE ADULT - ATTENDING COMMENTS
Current Issues:  R18.8 Abdominal fluid collection   - continuing broad spectrum abx.  Drain output acceptable, no significant change in appearance or quantity.  Adding octreotide  K65.9 Peritonitis   - as above  R50.9 Fever, unspecified fever cause   - likely ongoing peritonitis.  Drain output appropriate and, aside from the ongoing fevers, is slowly improving.  Continue current therapy.  Z91.89 At risk for malnutrition   - on TFs  D64.9 Anemia, unspecified type   - no signs of clinically significant bleeding on exam

## 2019-02-08 NOTE — PROGRESS NOTE ADULT - ASSESSMENT
54y Female PMHx HTN, T2DM, asthma, depression with recent diagnosis of invasive ampullary adenocarcinoma of the CBD admitted for cholangitis on 1/5, s/p ERCP 1/8, whipple 1/11, rapid response for GIB on floor 1/18 s/p MTP, RTOR s/p celiotomy, evacuation of hematoma, GDA bleed stopped and sutured, Abthera vac. Closure of abdomen with strattice mesh on 1/21, with inna drains and left lower JOURDAN drain in subcutaneous. Incisional bleed 1/23, bedside skin staples removed, JOURDAN drain removed, hemorrhage controlled, for continuation of ICU care. S/p interventional radiology drainage of LUQ collection 2/4    PLAN:  -OOBTC  -pain control as needed  -cont lovenox for DVT ppx  -PT/oob  -cont NPO/IVF  -c/w tube feeds via tiger tube  -monitor VS  -consider ID consult: cont current abx   -appreciate SICU care    D Team Surgery  [id number] 54y Female PMHx HTN, T2DM, asthma, depression with recent diagnosis of invasive ampullary adenocarcinoma of the CBD admitted for cholangitis on 1/5, s/p ERCP 1/8, whipple 1/11, rapid response for GIB on floor 1/18 s/p MTP, RTOR s/p celiotomy, evacuation of hematoma, GDA bleed stopped and sutured, Abthera vac. Closure of abdomen with strattice mesh on 1/21, with inna drains and left lower JOURDAN drain in subcutaneous. Incisional bleed 1/23, bedside skin staples removed, JOURDAN drain removed, hemorrhage controlled, for continuation of ICU care. S/p interventional radiology drainage of LUQ collection 2/4    PLAN:  -OOBTC  -pain control as needed  -cont lovenox for DVT ppx  -PT/oob  -cont NPO/IVF  -c/w tube feeds via tiger tube  -monitor VS  -cont current abx   -start octreotide  -appreciate SICU care    D Team Surgery  [id number]

## 2019-02-09 LAB
ALBUMIN SERPL ELPH-MCNC: 1.8 G/DL — LOW (ref 3.3–5)
ALP SERPL-CCNC: 441 U/L — HIGH (ref 40–120)
ALT FLD-CCNC: 18 U/L — SIGNIFICANT CHANGE UP (ref 4–33)
ANION GAP SERPL CALC-SCNC: 11 MMO/L — SIGNIFICANT CHANGE UP (ref 7–14)
APPEARANCE UR: SIGNIFICANT CHANGE UP
AST SERPL-CCNC: 39 U/L — HIGH (ref 4–32)
BACTERIA # UR AUTO: NEGATIVE — SIGNIFICANT CHANGE UP
BASE EXCESS BLDA CALC-SCNC: 3.2 MMOL/L — SIGNIFICANT CHANGE UP
BASOPHILS # BLD AUTO: 0.1 K/UL — SIGNIFICANT CHANGE UP (ref 0–0.2)
BASOPHILS NFR BLD AUTO: 0.6 % — SIGNIFICANT CHANGE UP (ref 0–2)
BILIRUB DIRECT SERPL-MCNC: 2 MG/DL — HIGH (ref 0.1–0.2)
BILIRUB SERPL-MCNC: 2.7 MG/DL — HIGH (ref 0.2–1.2)
BILIRUB UR-MCNC: SIGNIFICANT CHANGE UP
BLOOD UR QL VISUAL: SIGNIFICANT CHANGE UP
BUN SERPL-MCNC: 13 MG/DL — SIGNIFICANT CHANGE UP (ref 7–23)
CA-I BLD-SCNC: 1.09 MMOL/L — SIGNIFICANT CHANGE UP (ref 1.03–1.23)
CALCIUM SERPL-MCNC: 7.8 MG/DL — LOW (ref 8.4–10.5)
CHLORIDE BLDA-SCNC: 100 MMOL/L — SIGNIFICANT CHANGE UP (ref 96–108)
CHLORIDE SERPL-SCNC: 97 MMOL/L — LOW (ref 98–107)
CO2 SERPL-SCNC: 26 MMOL/L — SIGNIFICANT CHANGE UP (ref 22–31)
COLOR SPEC: SIGNIFICANT CHANGE UP
CREAT SERPL-MCNC: 0.51 MG/DL — SIGNIFICANT CHANGE UP (ref 0.5–1.3)
EOSINOPHIL # BLD AUTO: 0.39 K/UL — SIGNIFICANT CHANGE UP (ref 0–0.5)
EOSINOPHIL NFR BLD AUTO: 2.2 % — SIGNIFICANT CHANGE UP (ref 0–6)
GLUCOSE BLDA-MCNC: 144 MG/DL — HIGH (ref 70–99)
GLUCOSE SERPL-MCNC: 145 MG/DL — HIGH (ref 70–99)
GLUCOSE UR-MCNC: NEGATIVE — SIGNIFICANT CHANGE UP
GRAN CASTS # UR COMP ASSIST: SIGNIFICANT CHANGE UP
HCO3 BLDA-SCNC: 27 MMOL/L — HIGH (ref 22–26)
HCT VFR BLD CALC: 27.9 % — LOW (ref 34.5–45)
HCT VFR BLDA CALC: 27.1 % — LOW (ref 34.5–46.5)
HGB BLD-MCNC: 8.6 G/DL — LOW (ref 11.5–15.5)
HGB BLDA-MCNC: 8.7 G/DL — LOW (ref 11.5–15.5)
HYALINE CASTS # UR AUTO: HIGH
IMM GRANULOCYTES NFR BLD AUTO: 1.9 % — HIGH (ref 0–1.5)
KETONES UR-MCNC: NEGATIVE — SIGNIFICANT CHANGE UP
LACTATE BLDA-SCNC: 4.2 MMOL/L — CRITICAL HIGH (ref 0.5–2)
LEUKOCYTE ESTERASE UR-ACNC: NEGATIVE — SIGNIFICANT CHANGE UP
LYMPHOCYTES # BLD AUTO: 12.6 % — LOW (ref 13–44)
LYMPHOCYTES # BLD AUTO: 2.22 K/UL — SIGNIFICANT CHANGE UP (ref 1–3.3)
MAGNESIUM SERPL-MCNC: 1.9 MG/DL — SIGNIFICANT CHANGE UP (ref 1.6–2.6)
MCHC RBC-ENTMCNC: 27.8 PG — SIGNIFICANT CHANGE UP (ref 27–34)
MCHC RBC-ENTMCNC: 30.8 % — LOW (ref 32–36)
MCV RBC AUTO: 90.3 FL — SIGNIFICANT CHANGE UP (ref 80–100)
MONOCYTES # BLD AUTO: 2.16 K/UL — HIGH (ref 0–0.9)
MONOCYTES NFR BLD AUTO: 12.3 % — SIGNIFICANT CHANGE UP (ref 2–14)
NEUTROPHILS # BLD AUTO: 12.4 K/UL — HIGH (ref 1.8–7.4)
NEUTROPHILS NFR BLD AUTO: 70.4 % — SIGNIFICANT CHANGE UP (ref 43–77)
NITRITE UR-MCNC: NEGATIVE — SIGNIFICANT CHANGE UP
NRBC # FLD: 0 K/UL — LOW (ref 25–125)
PCO2 BLDA: 46 MMHG — SIGNIFICANT CHANGE UP (ref 32–48)
PH BLDA: 7.4 PH — SIGNIFICANT CHANGE UP (ref 7.35–7.45)
PH UR: 6 — SIGNIFICANT CHANGE UP (ref 5–8)
PHOSPHATE SERPL-MCNC: 3.2 MG/DL — SIGNIFICANT CHANGE UP (ref 2.5–4.5)
PLATELET # BLD AUTO: 292 K/UL — SIGNIFICANT CHANGE UP (ref 150–400)
PMV BLD: 11.1 FL — SIGNIFICANT CHANGE UP (ref 7–13)
PO2 BLDA: 84 MMHG — SIGNIFICANT CHANGE UP (ref 83–108)
POTASSIUM BLDA-SCNC: 3.7 MMOL/L — SIGNIFICANT CHANGE UP (ref 3.4–4.5)
POTASSIUM SERPL-MCNC: 4.1 MMOL/L — SIGNIFICANT CHANGE UP (ref 3.5–5.3)
POTASSIUM SERPL-SCNC: 4.1 MMOL/L — SIGNIFICANT CHANGE UP (ref 3.5–5.3)
PROT SERPL-MCNC: 5.7 G/DL — LOW (ref 6–8.3)
PROT UR-MCNC: 100 — HIGH
RBC # BLD: 3.09 M/UL — LOW (ref 3.8–5.2)
RBC # FLD: 17.4 % — HIGH (ref 10.3–14.5)
RBC CASTS # UR COMP ASSIST: SIGNIFICANT CHANGE UP (ref 0–?)
SAO2 % BLDA: 96.7 % — SIGNIFICANT CHANGE UP (ref 95–99)
SODIUM BLDA-SCNC: 129 MMOL/L — LOW (ref 136–146)
SODIUM SERPL-SCNC: 134 MMOL/L — LOW (ref 135–145)
SP GR SPEC: 1.03 — SIGNIFICANT CHANGE UP (ref 1–1.04)
SQUAMOUS # UR AUTO: SIGNIFICANT CHANGE UP
UROBILINOGEN FLD QL: SIGNIFICANT CHANGE UP
VANCOMYCIN TROUGH SERPL-MCNC: 17.7 UG/ML — SIGNIFICANT CHANGE UP (ref 10–20)
WBC # BLD: 17.6 K/UL — HIGH (ref 3.8–10.5)
WBC # FLD AUTO: 17.6 K/UL — HIGH (ref 3.8–10.5)
WBC UR QL: SIGNIFICANT CHANGE UP (ref 0–?)

## 2019-02-09 PROCEDURE — 99233 SBSQ HOSP IP/OBS HIGH 50: CPT | Mod: GC

## 2019-02-09 PROCEDURE — 71045 X-RAY EXAM CHEST 1 VIEW: CPT | Mod: 26

## 2019-02-09 RX ADMIN — Medication 2: at 12:32

## 2019-02-09 RX ADMIN — OCTREOTIDE ACETATE 100 MICROGRAM(S): 200 INJECTION, SOLUTION INTRAVENOUS; SUBCUTANEOUS at 14:28

## 2019-02-09 RX ADMIN — OXYCODONE HYDROCHLORIDE 10 MILLIGRAM(S): 5 TABLET ORAL at 12:33

## 2019-02-09 RX ADMIN — ENOXAPARIN SODIUM 40 MILLIGRAM(S): 100 INJECTION SUBCUTANEOUS at 12:32

## 2019-02-09 RX ADMIN — Medication 250 MILLIGRAM(S): at 18:28

## 2019-02-09 RX ADMIN — Medication 250 MILLIGRAM(S): at 05:43

## 2019-02-09 RX ADMIN — MEROPENEM 100 MILLIGRAM(S): 1 INJECTION INTRAVENOUS at 05:05

## 2019-02-09 RX ADMIN — MEROPENEM 100 MILLIGRAM(S): 1 INJECTION INTRAVENOUS at 23:20

## 2019-02-09 RX ADMIN — MEROPENEM 100 MILLIGRAM(S): 1 INJECTION INTRAVENOUS at 14:28

## 2019-02-09 RX ADMIN — Medication 650 MILLIGRAM(S): at 19:45

## 2019-02-09 RX ADMIN — OXYCODONE HYDROCHLORIDE 10 MILLIGRAM(S): 5 TABLET ORAL at 13:03

## 2019-02-09 RX ADMIN — Medication 650 MILLIGRAM(S): at 00:30

## 2019-02-09 RX ADMIN — OXYCODONE HYDROCHLORIDE 10 MILLIGRAM(S): 5 TABLET ORAL at 18:40

## 2019-02-09 RX ADMIN — Medication 650 MILLIGRAM(S): at 21:00

## 2019-02-09 RX ADMIN — Medication 1 DROP(S): at 05:42

## 2019-02-09 RX ADMIN — OCTREOTIDE ACETATE 100 MICROGRAM(S): 200 INJECTION, SOLUTION INTRAVENOUS; SUBCUTANEOUS at 23:20

## 2019-02-09 RX ADMIN — CHLORHEXIDINE GLUCONATE 1 APPLICATION(S): 213 SOLUTION TOPICAL at 16:27

## 2019-02-09 RX ADMIN — OCTREOTIDE ACETATE 100 MICROGRAM(S): 200 INJECTION, SOLUTION INTRAVENOUS; SUBCUTANEOUS at 05:42

## 2019-02-09 RX ADMIN — MICAFUNGIN SODIUM 107.5 MILLIGRAM(S): 100 INJECTION, POWDER, LYOPHILIZED, FOR SOLUTION INTRAVENOUS at 12:32

## 2019-02-09 RX ADMIN — PANTOPRAZOLE SODIUM 40 MILLIGRAM(S): 20 TABLET, DELAYED RELEASE ORAL at 12:32

## 2019-02-09 RX ADMIN — Medication 1 DROP(S): at 18:36

## 2019-02-09 RX ADMIN — OXYCODONE HYDROCHLORIDE 10 MILLIGRAM(S): 5 TABLET ORAL at 19:10

## 2019-02-09 NOTE — PROGRESS NOTE ADULT - ATTENDING COMMENTS
Current Issues:  R18.8 Abdominal fluid collection   - continuing broad spectrum abx.  HD stable  K65.9 Peritonitis   - as above  R50.9 Fever, unspecified fever cause   - repeat blood cultures sent, awaiting results.  Holding off on CT for now after discussion with primary, as patient overall doing well and drain output appropriate  Z91.89 At risk for malnutrition   - on TFs  D64.9 Anemia, unspecified type   - no signs of clinically significant bleeding on exam

## 2019-02-09 NOTE — PROGRESS NOTE ADULT - ASSESSMENT
54y Female PMHx HTN, T2DM, asthma, depression with recent diagnosis of invasive ampullary adenocarcinoma of the CBD admitted for cholangitis on 1/5, s/p ERCP 1/8, whipple 1/11, rapid response for GIB on floor 1/18 s/p MTP, RTOR s/p celiotomy, evacuation of hematoma, GDA bleed stopped and sutured, Abthera vac. Closure of abdomen with strattice mesh on 1/21, with inna drains and left lower JOURDAN drain in subcutaneous. Incisional bleed 1/23, bedside skin staples removed, JOURDAN drain removed, hemorrhage controlled, for continuation of ICU care. S/p interventional radiology drainage of LUQ collection 2/4    PLAN:  -OOBTC  -pain control as needed  -cont lovenox for DVT ppx  -PT/oob  -cont NPO/IVF  -c/w tube feeds via tiger tube  -monitor VS  -cont current abx   -c/w octreotide  -midline vac change today  -appreciate SICU care    D Team Surgery  [id number]

## 2019-02-09 NOTE — PROGRESS NOTE ADULT - SUBJECTIVE AND OBJECTIVE BOX
SICU Daily Progress Note  =====================================================  Interval/Overnight Events: Octreotide started for bilious output from drains. Pt given lasix 20mg X 2. Goal to keep net negative 1-2 liters. Noted to be more alert per family, "seems like she is acting more and more like herself."      HPI: 54y Female PMHx HTN, T2DM, asthma, depression with recent diagnosis of invasive ampullary adenocarcinoma of the CBD admitted for cholangitis on 1/5, s/p ERCP 1/8, whipple 1/11, rapid response for GIB on floor 1/18 s/p MTP, RTOR s/p celiotomy, evacuation of hematoma, GDA bleed stopped and sutured, Abthera vac. Closure of abdomen with strattice mesh on 1/21, with inna drains and left lower JORUDAN drain in subcutaneous, s/p incisional bleed 1/23, bedside skin staples removed, JOURDAN drain removed, hemorrhage controlled, for continuation of ICU care for respiratory failure requiring intubation s/p extubation 1/29, underwent CT A/P 2/2 which showed large LUQ collection and multiple smaller intraabdominal collection s/p IR drainage 2/4      Allergies: No Known Allergies      MEDICATIONS:   --------------------------------------------------------------------------------------  Neurologic Medications  acetaminophen    Suspension .. 650 milliGRAM(s) Oral every 6 hours PRN Temp greater or equal to 38C (100.4F), Mild Pain (1 - 3)  oxyCODONE    Solution 5 milliGRAM(s) Oral every 4 hours PRN Moderate Pain (4 - 6)  oxyCODONE    Solution 10 milliGRAM(s) Oral every 4 hours PRN Severe Pain (7 - 10)    Respiratory Medications    Cardiovascular Medications    Gastrointestinal Medications  pantoprazole   Suspension 40 milliGRAM(s) Oral daily    Genitourinary Medications    Hematologic/Oncologic Medications  enoxaparin Injectable 40 milliGRAM(s) SubCutaneous daily    Antimicrobial/Immunologic Medications  meropenem  IVPB 1000 milliGRAM(s) IV Intermittent every 8 hours  micafungin IVPB      micafungin IVPB 150 milliGRAM(s) IV Intermittent every 24 hours  vancomycin  IVPB      vancomycin  IVPB 750 milliGRAM(s) IV Intermittent every 12 hours    Endocrine/Metabolic Medications  insulin lispro (HumaLOG) corrective regimen sliding scale   SubCutaneous every 6 hours  octreotide  Injectable 100 MICROGram(s) SubCutaneous every 8 hours    Topical/Other Medications  artificial  tears Solution 1 Drop(s) Both EYES every 12 hours  chlorhexidine 4% Liquid 1 Application(s) Topical <User Schedule>    --------------------------------------------------------------------------------------  ICU Vital Signs Last 24 Hrs  T(C): 38.4 (09 Feb 2019 00:00), Max: 38.7 (08 Feb 2019 16:00)  T(F): 101.2 (09 Feb 2019 00:00), Max: 101.6 (08 Feb 2019 16:00)  HR: 105 (09 Feb 2019 00:00) (96 - 109)  BP: --  BP(mean): --  ABP: 157/75 (09 Feb 2019 00:00) (133/61 - 168/80)  ABP(mean): 109 (09 Feb 2019 00:00) (91 - 117)  RR: 19 (09 Feb 2019 00:00) (14 - 29)  SpO2: 96% (09 Feb 2019 00:00) (94% - 98%)    --------------------------------------------------------------------------------------  I&O's Detail    07 Feb 2019 07:01  -  08 Feb 2019 07:00  --------------------------------------------------------  IN:    Enteral Tube Flush: 130 mL    Glucerna: 1067 mL    IV PiggyBack: 1000 mL  Total IN: 2197 mL    OUT:    Bulb: 125 mL    Bulb: 125 mL    Drain: 10 mL    Indwelling Catheter - Urethral: 200 mL    VAC (Vacuum Assisted Closure) System: 350 mL    Voided: 200 mL  Total OUT: 1010 mL    Total NET: 1187 mL      08 Feb 2019 07:01  -  09 Feb 2019 01:07  --------------------------------------------------------  IN:    Enteral Tube Flush: 120 mL    Glucerna: 675 mL    IV PiggyBack: 150 mL  Total IN: 945 mL    OUT:    Bulb: 60 mL    Bulb: 45 mL    VAC (Vacuum Assisted Closure) System: 200 mL    Voided: 1975 mL  Total OUT: 2280 mL    Total NET: -1335 mL        --------------------------------------------------------------------------------------    EXAM  NEUROLOGY  NAD, oriented x 3, no focal deficit, follows commands     RESPIRATORY  Exam: Lungs clear to auscultation, nonlabored breathing     CARDIOVASCULAR  Exam: S1, S2. Regular rate and rhythm.     GI/NUTRITION  Exam: Abdomen soft, mildly tender, midline with VAC dressing in place, R JOURDAN drain minimal bilious output, left JOURDAN drain with bilious drainage, IR drain with serosanguinous drainage.     VASCULAR  Exam: Extremities warm, pink, well-perfused.    MUSCULOSKELETAL  Exam: All extremities moving spontaneously without limitations.     SKIN  Exam: Good skin turgor, no skin breakdown.     METABOLIC/FLUIDS/ELECTROLYTES      HEMATOLOGIC  [x] VTE Prophylaxis: enoxaparin Injectable 40 milliGRAM(s) SubCutaneous daily    Transfusions:	[] PRBC	[] Platelets		[] FFP	[] Cryoprecipitate    INFECTIOUS DISEASE  Antimicrobials/Immunologic Medications:  meropenem  IVPB 1000 milliGRAM(s) IV Intermittent every 8 hours  micafungin IVPB      micafungin IVPB 150 milliGRAM(s) IV Intermittent every 24 hours  vancomycin  IVPB      vancomycin  IVPB 750 milliGRAM(s) IV Intermittent every 12 hours    Tubes/Lines/Drains  JOURDAN X 2, IR drain, VAC dressing  [x] Peripheral IV  [x] Arterial Line		[] R	[] L	[] Fem	[] Rad	[x] Ax	Date Placed:   [x] Urinary Catheter		  [x] Necessity of urinary, arterial, and venous catheters discussed    LABS  --------------------------------------------------------------------------------------  CBC (02-08 @ 03:00)                              8.8<L>                         16.71<H>  )----------------(  251        69.5  % Neutrophils, 12.8<L>% Lymphocytes, ANC: 11.60<H>                              27.5<L>    BMP (02-08 @ 03:00)             138     |  102     |  13    		Ca++ --      Ca 7.8<L>             ---------------------------------( 124<H>		Mg 2.0                4.1     |  24      |  0.47<L>			Ph 2.5       LFTs (02-08 @ 03:00)      TPro 5.8<L> / Alb 1.9<L> / TBili 3.1<H> / DBili 2.2<H> / AST 38<H> / ALT 16 / AlkPhos 340<H>      --------------------------------------------------------------------------------------    ASSESSMENT:  54y Female PMHx HTN, T2DM, asthma, depression with recent diagnosis of invasive ampullary adenocarcinoma of the CBD admitted for cholangitis on 1/5, s/p ERCP 1/8, whipple 1/11, rapid response for GIB on floor 1/18 s/p MTP, RTOR s/p celiotomy, evacuation of hematoma, GDA bleed stopped and sutured, Abthera vac. Closure of abdomen with strattice mesh on 1/21, with inna drains and left lower JOURDAN drain in subcutaneous. Incisional bleed 1/23, bedside skin staples removed, JOURDAN drain removed, hemorrhage controlled, respiratory failure s/p extubation 1/29, underwent CT A/P 2/2 which showed large LUQ collection and multiple smaller intraabdominal collections s/p IR drainage 2/4 with placement of drain, now with abdominal VAC in place  ?  Neurology:  -Tylenol & Oxy and Dilaudid PRN for pain  -stable     Resp:  -CXR overloaded, lasix 20mg X 2 give. Goal to keep net negative 1- 2 liters  -likely non cardiogenic edema   -97% on 2L NC   -OOB, pulmonary toilet   ?  CV:  - goal MAP > 65  - likely hypervolemic - stable  ?  GI:  - NPO, Tigertube on TFs (Glucerna)  - Bowel regimen  - Increased secretions in drains green/brown in color, vac placed by plastics, monitor output   - octreotide added for increased bilious output by drain   - protonix (home med)   - LFTs trending down   ?  Renal:  - monitor lytes, replete as needed  - IVF on hold, will resuscitate as needed  - start diuresis,  keep net negative 1-2L (given 20mg lasix X 2 today)   ?  Heme:  -monitor H/H, transfuse if <7   -ct lovenox vte ppx  ?  ID:  - Febrile, uptrending leukocytosis on vanco (2/3), Diflucan (2/3) --> micafungin (2/5), and meropenem (2/2)  - likely bile leaking causing pancreatitis vs peritonitis   - Vanco trough 2/5 pm 15, due 2/7 18.5. F/u vanco trough today  - U/A neg, blood cx neg 2/1  - c-diff negative  - continue to trend WBC+  - cx from drainage sent, only WBC no organisms   - need to rescan abdomen?   Endo  - ISS A1C8.0   ?  Dispo: SICU    Critcal Care Diagnosis: Septic shock, respiratory abnormality, malnutrition, peritonitis, abdominal abscess    --------------------------------------------------------------------------------------    Critical Care Diagnoses: SICU Daily Progress Note  =====================================================  Interval/Overnight Events: Octreotide started for bilious output from drains. Pt given lasix 20mg X 2. Goal to keep net negative 1-2 liters. Noted to be more alert per family, "seems like she is acting more and more like herself."      HPI: 54y Female PMHx HTN, T2DM, asthma, depression with recent diagnosis of invasive ampullary adenocarcinoma of the CBD admitted for cholangitis on 1/5, s/p ERCP 1/8, whipple 1/11, rapid response for GIB on floor 1/18 s/p MTP, RTOR s/p celiotomy, evacuation of hematoma, GDA bleed stopped and sutured, Abthera vac. Closure of abdomen with strattice mesh on 1/21, with inna drains and left lower JOURDAN drain in subcutaneous, s/p incisional bleed 1/23, bedside skin staples removed, JOURDAN drain removed, hemorrhage controlled, for continuation of ICU care for respiratory failure requiring intubation s/p extubation 1/29, underwent CT A/P 2/2 which showed large LUQ collection and multiple smaller intraabdominal collection s/p IR drainage 2/4      Allergies: No Known Allergies      MEDICATIONS:   --------------------------------------------------------------------------------------  Neurologic Medications  acetaminophen    Suspension .. 650 milliGRAM(s) Oral every 6 hours PRN Temp greater or equal to 38C (100.4F), Mild Pain (1 - 3)  oxyCODONE    Solution 5 milliGRAM(s) Oral every 4 hours PRN Moderate Pain (4 - 6)  oxyCODONE    Solution 10 milliGRAM(s) Oral every 4 hours PRN Severe Pain (7 - 10)    Respiratory Medications    Cardiovascular Medications    Gastrointestinal Medications  pantoprazole   Suspension 40 milliGRAM(s) Oral daily    Genitourinary Medications    Hematologic/Oncologic Medications  enoxaparin Injectable 40 milliGRAM(s) SubCutaneous daily    Antimicrobial/Immunologic Medications  meropenem  IVPB 1000 milliGRAM(s) IV Intermittent every 8 hours  micafungin IVPB      micafungin IVPB 150 milliGRAM(s) IV Intermittent every 24 hours  vancomycin  IVPB      vancomycin  IVPB 750 milliGRAM(s) IV Intermittent every 12 hours    Endocrine/Metabolic Medications  insulin lispro (HumaLOG) corrective regimen sliding scale   SubCutaneous every 6 hours  octreotide  Injectable 100 MICROGram(s) SubCutaneous every 8 hours    Topical/Other Medications  artificial  tears Solution 1 Drop(s) Both EYES every 12 hours  chlorhexidine 4% Liquid 1 Application(s) Topical <User Schedule>    --------------------------------------------------------------------------------------  ICU Vital Signs Last 24 Hrs  T(C): 38.4 (09 Feb 2019 00:00), Max: 38.7 (08 Feb 2019 16:00)  T(F): 101.2 (09 Feb 2019 00:00), Max: 101.6 (08 Feb 2019 16:00)  HR: 105 (09 Feb 2019 00:00) (96 - 109)  BP: --  BP(mean): --  ABP: 157/75 (09 Feb 2019 00:00) (133/61 - 168/80)  ABP(mean): 109 (09 Feb 2019 00:00) (91 - 117)  RR: 19 (09 Feb 2019 00:00) (14 - 29)  SpO2: 96% (09 Feb 2019 00:00) (94% - 98%)    --------------------------------------------------------------------------------------  I&O's Detail    07 Feb 2019 07:01  -  08 Feb 2019 07:00  --------------------------------------------------------  IN:    Enteral Tube Flush: 130 mL    Glucerna: 1067 mL    IV PiggyBack: 1000 mL  Total IN: 2197 mL    OUT:    Bulb: 125 mL    Bulb: 125 mL    Drain: 10 mL    Indwelling Catheter - Urethral: 200 mL    VAC (Vacuum Assisted Closure) System: 350 mL    Voided: 200 mL  Total OUT: 1010 mL    Total NET: 1187 mL      08 Feb 2019 07:01  -  09 Feb 2019 01:07  --------------------------------------------------------  IN:    Enteral Tube Flush: 120 mL    Glucerna: 675 mL    IV PiggyBack: 150 mL  Total IN: 945 mL    OUT:    Bulb: 60 mL    Bulb: 45 mL    VAC (Vacuum Assisted Closure) System: 200 mL    Voided: 1975 mL  Total OUT: 2280 mL    Total NET: -1335 mL        --------------------------------------------------------------------------------------    EXAM  NEUROLOGY  NAD, oriented x 3, no focal deficit, follows commands     RESPIRATORY  Exam: Lungs clear to auscultation, nonlabored breathing     CARDIOVASCULAR  Exam: S1, S2. Regular rate and rhythm.     GI/NUTRITION  Exam: Abdomen soft, mildly tender, midline with VAC dressing in place, R JOURDAN drain minimal bilious output, left JOURDAN drain with bilious drainage, IR drain with serosanguinous drainage.     VASCULAR  Exam: Extremities warm, pink, well-perfused.    MUSCULOSKELETAL  Exam: All extremities moving spontaneously without limitations.     SKIN  Exam: Good skin turgor, no skin breakdown.     METABOLIC/FLUIDS/ELECTROLYTES      HEMATOLOGIC  [x] VTE Prophylaxis: enoxaparin Injectable 40 milliGRAM(s) SubCutaneous daily    Transfusions:	[] PRBC	[] Platelets		[] FFP	[] Cryoprecipitate    INFECTIOUS DISEASE  Antimicrobials/Immunologic Medications:  meropenem  IVPB 1000 milliGRAM(s) IV Intermittent every 8 hours  micafungin IVPB      micafungin IVPB 150 milliGRAM(s) IV Intermittent every 24 hours  vancomycin  IVPB      vancomycin  IVPB 750 milliGRAM(s) IV Intermittent every 12 hours    Tubes/Lines/Drains  JOURDAN X 2, IR drain, VAC dressing  [x] Peripheral IV  [x] Arterial Line		[] R	[] L	[] Fem	[] Rad	[x] Ax	Date Placed:   [x] Urinary Catheter		  [x] Necessity of urinary, arterial, and venous catheters discussed    LABS  --------------------------------------------------------------------------------------  CBC (02-08 @ 03:00)                              8.8<L>                         16.71<H>  )----------------(  251        69.5  % Neutrophils, 12.8<L>% Lymphocytes, ANC: 11.60<H>                              27.5<L>    BMP (02-08 @ 03:00)             138     |  102     |  13    		Ca++ --      Ca 7.8<L>             ---------------------------------( 124<H>		Mg 2.0                4.1     |  24      |  0.47<L>			Ph 2.5       LFTs (02-08 @ 03:00)      TPro 5.8<L> / Alb 1.9<L> / TBili 3.1<H> / DBili 2.2<H> / AST 38<H> / ALT 16 / AlkPhos 340<H>      --------------------------------------------------------------------------------------  Critical Care Diagnoses:

## 2019-02-09 NOTE — PROGRESS NOTE ADULT - SUBJECTIVE AND OBJECTIVE BOX
Surgery Progress Note    S: Patient seen and examined. Patient continues to intermittently spike fevers. Octreotide was started. patient denies n/v.     O:  Vital Signs Last 24 Hrs  T(C): 37.1 (09 Feb 2019 04:00), Max: 38.7 (08 Feb 2019 16:00)  T(F): 98.7 (09 Feb 2019 04:00), Max: 101.6 (08 Feb 2019 16:00)  HR: 92 (09 Feb 2019 07:00) (86 - 109)  BP: --  BP(mean): --  RR: 15 (09 Feb 2019 07:00) (12 - 26)  SpO2: 97% (09 Feb 2019 07:00) (94% - 98%)    I&O's Detail    08 Feb 2019 07:01  -  09 Feb 2019 07:00  --------------------------------------------------------  IN:    Enteral Tube Flush: 180 mL    Glucerna: 990 mL    IV PiggyBack: 450 mL  Total IN: 1620 mL    OUT:    Bulb: 80 mL    Bulb: 90 mL    VAC (Vacuum Assisted Closure) System: 300 mL    Voided: 2175 mL  Total OUT: 2645 mL    Total NET: -1025 mL          MEDICATIONS  (STANDING):  artificial  tears Solution 1 Drop(s) Both EYES every 12 hours  chlorhexidine 4% Liquid 1 Application(s) Topical <User Schedule>  enoxaparin Injectable 40 milliGRAM(s) SubCutaneous daily  insulin lispro (HumaLOG) corrective regimen sliding scale   SubCutaneous every 6 hours  meropenem  IVPB 1000 milliGRAM(s) IV Intermittent every 8 hours  micafungin IVPB      micafungin IVPB 150 milliGRAM(s) IV Intermittent every 24 hours  octreotide  Injectable 100 MICROGram(s) SubCutaneous every 8 hours  pantoprazole   Suspension 40 milliGRAM(s) Oral daily  vancomycin  IVPB      vancomycin  IVPB 750 milliGRAM(s) IV Intermittent every 12 hours    MEDICATIONS  (PRN):  acetaminophen    Suspension .. 650 milliGRAM(s) Oral every 6 hours PRN Temp greater or equal to 38C (100.4F), Mild Pain (1 - 3)  oxyCODONE    Solution 5 milliGRAM(s) Oral every 4 hours PRN Moderate Pain (4 - 6)  oxyCODONE    Solution 10 milliGRAM(s) Oral every 4 hours PRN Severe Pain (7 - 10)                            8.6    17.60 )-----------( 292      ( 09 Feb 2019 03:30 )             27.9       02-09    134<L>  |  97<L>  |  13  ----------------------------<  145<H>  4.1   |  26  |  0.51    Ca    7.8<L>      09 Feb 2019 03:30  Phos  3.2     02-09  Mg     1.9     02-09    TPro  5.7<L>  /  Alb  1.8<L>  /  TBili  2.7<H>  /  DBili  2.0<H>  /  AST  39<H>  /  ALT  18  /  AlkPhos  441<H>  02-09      Physical Exam:  Gen: Laying in bed, NAD, tiger tube in place  Resp: Unlabored breathing  Abd: soft, NTND, midline vac in place, RLQ and LLQ JOURDAN with bilious output that is more serous today, LUQ drain with SS output, no rebound or guarding  Ext: WWP  Skin: No rashes

## 2019-02-09 NOTE — PROGRESS NOTE ADULT - ASSESSMENT
ASSESSMENT:  54y Female PMHx HTN, T2DM, asthma, depression with recent diagnosis of invasive ampullary adenocarcinoma of the CBD admitted for cholangitis on 1/5, s/p ERCP 1/8, whipple 1/11, rapid response for GIB on floor 1/18 s/p MTP, RTOR s/p celiotomy, evacuation of hematoma, GDA bleed stopped and sutured, Abthera vac. Closure of abdomen with strattice mesh on 1/21, with inna drains and left lower JOURDAN drain in subcutaneous. Incisional bleed 1/23, bedside skin staples removed, JOURDAN drain removed, hemorrhage controlled, respiratory failure s/p extubation 1/29, underwent CT A/P 2/2 which showed large LUQ collection and multiple smaller intraabdominal collections s/p IR drainage 2/4 with placement of drain, now with abdominal VAC in place  ?  Neurology:  -Tylenol & Oxy and Dilaudid PRN for pain  -stable     Resp:  -CXR overloaded, lasix 20mg X 2 give. Goal to keep net negative 1- 2 liters  -likely non cardiogenic edema   -97% on 2L NC   -OOB, pulmonary toilet   ?  CV:  - goal MAP > 65  - likely hypervolemic - stable  ?  GI:  - NPO, Tigertube on TFs (Glucerna)  - Bowel regimen  - Increased secretions in drains green/brown in color, vac placed by plastics, monitor output   - vac change today  - c/w octreotide increased bilious output by drain   - protonix (home med)   - LFTs stable   ?  Renal:  - monitor lytes, replete as needed  - IVF on hold, will resuscitate as needed  - c/w diuresis,  keep net negative 1-2L (given 20mg lasix X 2 yesterday)   ?  Heme:  -monitor H/H, transfuse if <7   -ct lovenox vte ppx  ?  ID:  - Febrile, uptrending leukocytosis on vanco (2/3), Diflucan (2/3) --> micafungin (2/5), and meropenem (2/2)  - likely bile leaking causing pancreatitis vs peritonitis   - Vanco trough 2/5 pm 15, due 2/7 18.5. F/u vanco trough today  - U/A neg, blood cx neg 2/1, reculture today  - c-diff negative  - continue to trend WBC+  - cx from drainage sent, only WBC no organisms     Endo  - ISS A1C8.0   - FS q6   ?  Dispo: SICU    Critcal Care Diagnosis: Septic shock, respiratory abnormality, malnutrition, peritonitis, abdominal abscess    --------------------------------------------------------------------------------------

## 2019-02-10 LAB
ALBUMIN SERPL ELPH-MCNC: 1.7 G/DL — LOW (ref 3.3–5)
ALP SERPL-CCNC: 500 U/L — HIGH (ref 40–120)
ALT FLD-CCNC: 18 U/L — SIGNIFICANT CHANGE UP (ref 4–33)
ANION GAP SERPL CALC-SCNC: 12 MMO/L — SIGNIFICANT CHANGE UP (ref 7–14)
APTT BLD: 32.2 SEC — SIGNIFICANT CHANGE UP (ref 27.5–36.3)
AST SERPL-CCNC: 38 U/L — HIGH (ref 4–32)
BASE EXCESS BLDA CALC-SCNC: 5 MMOL/L — SIGNIFICANT CHANGE UP
BILIRUB DIRECT SERPL-MCNC: 1.7 MG/DL — HIGH (ref 0.1–0.2)
BILIRUB SERPL-MCNC: 2.5 MG/DL — HIGH (ref 0.2–1.2)
BUN SERPL-MCNC: 13 MG/DL — SIGNIFICANT CHANGE UP (ref 7–23)
CA-I BLD-SCNC: 1.1 MMOL/L — SIGNIFICANT CHANGE UP (ref 1.03–1.23)
CA-I BLDA-SCNC: 1.15 MMOL/L — SIGNIFICANT CHANGE UP (ref 1.15–1.29)
CALCIUM SERPL-MCNC: 7.7 MG/DL — LOW (ref 8.4–10.5)
CHLORIDE SERPL-SCNC: 97 MMOL/L — LOW (ref 98–107)
CO2 SERPL-SCNC: 25 MMOL/L — SIGNIFICANT CHANGE UP (ref 22–31)
CREAT SERPL-MCNC: 0.46 MG/DL — LOW (ref 0.5–1.3)
CULTURE - SURGICAL SITE: SIGNIFICANT CHANGE UP
GLUCOSE BLDA-MCNC: 167 MG/DL — HIGH (ref 70–99)
GLUCOSE SERPL-MCNC: 153 MG/DL — HIGH (ref 70–99)
HBA1C BLD-MCNC: 5.1 % — SIGNIFICANT CHANGE UP (ref 4–5.6)
HCO3 BLDA-SCNC: 29 MMOL/L — HIGH (ref 22–26)
HCT VFR BLD CALC: 27.7 % — LOW (ref 34.5–45)
HCT VFR BLDA CALC: 28 % — LOW (ref 34.5–46.5)
HGB BLD-MCNC: 8.7 G/DL — LOW (ref 11.5–15.5)
HGB BLDA-MCNC: 9 G/DL — LOW (ref 11.5–15.5)
INR BLD: 1.37 — HIGH (ref 0.88–1.17)
LACTATE BLDA-SCNC: 3.3 MMOL/L — HIGH (ref 0.5–2)
MAGNESIUM SERPL-MCNC: 1.9 MG/DL — SIGNIFICANT CHANGE UP (ref 1.6–2.6)
MCHC RBC-ENTMCNC: 27.9 PG — SIGNIFICANT CHANGE UP (ref 27–34)
MCHC RBC-ENTMCNC: 31.4 % — LOW (ref 32–36)
MCV RBC AUTO: 88.8 FL — SIGNIFICANT CHANGE UP (ref 80–100)
NRBC # FLD: 0 K/UL — LOW (ref 25–125)
PCO2 BLDA: 45 MMHG — SIGNIFICANT CHANGE UP (ref 32–48)
PH BLDA: 7.43 PH — SIGNIFICANT CHANGE UP (ref 7.35–7.45)
PHOSPHATE SERPL-MCNC: 2.4 MG/DL — LOW (ref 2.5–4.5)
PLATELET # BLD AUTO: 332 K/UL — SIGNIFICANT CHANGE UP (ref 150–400)
PMV BLD: 10.6 FL — SIGNIFICANT CHANGE UP (ref 7–13)
PO2 BLDA: 80 MMHG — LOW (ref 83–108)
POTASSIUM BLDA-SCNC: 4 MMOL/L — SIGNIFICANT CHANGE UP (ref 3.4–4.5)
POTASSIUM SERPL-MCNC: 4.3 MMOL/L — SIGNIFICANT CHANGE UP (ref 3.5–5.3)
POTASSIUM SERPL-SCNC: 4.3 MMOL/L — SIGNIFICANT CHANGE UP (ref 3.5–5.3)
PROT SERPL-MCNC: 5.8 G/DL — LOW (ref 6–8.3)
PROTHROM AB SERPL-ACNC: 15.8 SEC — HIGH (ref 9.8–13.1)
RBC # BLD: 3.12 M/UL — LOW (ref 3.8–5.2)
RBC # FLD: 17.2 % — HIGH (ref 10.3–14.5)
SAO2 % BLDA: 96.2 % — SIGNIFICANT CHANGE UP (ref 95–99)
SODIUM BLDA-SCNC: 131 MMOL/L — LOW (ref 136–146)
SODIUM SERPL-SCNC: 134 MMOL/L — LOW (ref 135–145)
SPECIMEN SOURCE: SIGNIFICANT CHANGE UP
SPECIMEN SOURCE: SIGNIFICANT CHANGE UP
WBC # BLD: 17.69 K/UL — HIGH (ref 3.8–10.5)
WBC # FLD AUTO: 17.69 K/UL — HIGH (ref 3.8–10.5)

## 2019-02-10 PROCEDURE — 99233 SBSQ HOSP IP/OBS HIGH 50: CPT | Mod: GC

## 2019-02-10 RX ORDER — FUROSEMIDE 40 MG
20 TABLET ORAL ONCE
Qty: 0 | Refills: 0 | Status: COMPLETED | OUTPATIENT
Start: 2019-02-10 | End: 2019-02-10

## 2019-02-10 RX ADMIN — MICAFUNGIN SODIUM 107.5 MILLIGRAM(S): 100 INJECTION, POWDER, LYOPHILIZED, FOR SOLUTION INTRAVENOUS at 10:59

## 2019-02-10 RX ADMIN — Medication 2: at 11:40

## 2019-02-10 RX ADMIN — Medication 250 MILLIGRAM(S): at 07:12

## 2019-02-10 RX ADMIN — Medication 1 DROP(S): at 17:19

## 2019-02-10 RX ADMIN — Medication 20 MILLIGRAM(S): at 06:19

## 2019-02-10 RX ADMIN — Medication 650 MILLIGRAM(S): at 17:20

## 2019-02-10 RX ADMIN — OCTREOTIDE ACETATE 100 MICROGRAM(S): 200 INJECTION, SOLUTION INTRAVENOUS; SUBCUTANEOUS at 23:33

## 2019-02-10 RX ADMIN — ENOXAPARIN SODIUM 40 MILLIGRAM(S): 100 INJECTION SUBCUTANEOUS at 11:40

## 2019-02-10 RX ADMIN — Medication 2: at 17:19

## 2019-02-10 RX ADMIN — Medication 1 DROP(S): at 06:19

## 2019-02-10 RX ADMIN — PANTOPRAZOLE SODIUM 40 MILLIGRAM(S): 20 TABLET, DELAYED RELEASE ORAL at 11:41

## 2019-02-10 RX ADMIN — Medication 650 MILLIGRAM(S): at 18:02

## 2019-02-10 RX ADMIN — OCTREOTIDE ACETATE 100 MICROGRAM(S): 200 INJECTION, SOLUTION INTRAVENOUS; SUBCUTANEOUS at 14:01

## 2019-02-10 RX ADMIN — Medication 2: at 06:20

## 2019-02-10 RX ADMIN — CHLORHEXIDINE GLUCONATE 1 APPLICATION(S): 213 SOLUTION TOPICAL at 10:18

## 2019-02-10 RX ADMIN — Medication 63.75 MILLIMOLE(S): at 06:19

## 2019-02-10 RX ADMIN — Medication 250 MILLIGRAM(S): at 17:20

## 2019-02-10 RX ADMIN — MEROPENEM 100 MILLIGRAM(S): 1 INJECTION INTRAVENOUS at 06:19

## 2019-02-10 RX ADMIN — MEROPENEM 100 MILLIGRAM(S): 1 INJECTION INTRAVENOUS at 23:32

## 2019-02-10 RX ADMIN — MEROPENEM 100 MILLIGRAM(S): 1 INJECTION INTRAVENOUS at 14:01

## 2019-02-10 RX ADMIN — Medication 2: at 23:33

## 2019-02-10 RX ADMIN — OCTREOTIDE ACETATE 100 MICROGRAM(S): 200 INJECTION, SOLUTION INTRAVENOUS; SUBCUTANEOUS at 06:20

## 2019-02-10 NOTE — PROGRESS NOTE ADULT - ASSESSMENT
54y Female PMHx HTN, T2DM, asthma, depression with recent diagnosis of invasive ampullary adenocarcinoma of the CBD admitted for cholangitis on 1/5, s/p ERCP 1/8, whipple 1/11, rapid response for GIB on floor 1/18 s/p MTP, RTOR s/p celiotomy, evacuation of hematoma, GDA bleed stopped and sutured, Abthera vac. Closure of abdomen with strattice mesh on 1/21, with inna drains and left lower JOURDAN drain in subcutaneous. Incisional bleed 1/23, bedside skin staples removed, JOURDAN drain removed, hemorrhage controlled, for continuation of ICU care. S/p interventional radiology drainage of LUQ collection 2/4    PLAN:  -cont to appreciate SICU care  -pain control as needed  -cont lovenox for DVT ppx  -PT/oob  -cont NPO/IVF/tube feeds via tiger tube; poss PO feeds around tiger tube  -monitor VS  -f/u cultures  -cont current abx   -c/w octreotide      D Team Surgery  [id number]

## 2019-02-10 NOTE — PROGRESS NOTE ADULT - SUBJECTIVE AND OBJECTIVE BOX
SICU Daily Progress Note  =====================================================  Interval/Overnight Events: Vac changed today by plastics. Continues to spike fevers. Re-cultured yesterday.       HPI: 54y Female PMHx HTN, T2DM, asthma, depression with recent diagnosis of invasive ampullary adenocarcinoma of the CBD admitted for cholangitis on 1/5, s/p ERCP 1/8, whipple 1/11, rapid response for GIB on floor 1/18 s/p MTP, RTOR s/p celiotomy, evacuation of hematoma, GDA bleed stopped and sutured, Abthera vac. Closure of abdomen with strattice mesh on 1/21, with inna drains and left lower JOURDAN drain in subcutaneous, s/p incisional bleed 1/23, bedside skin staples removed, JOURDAN drain removed, hemorrhage controlled, for continuation of ICU care for respiratory failure requiring intubation s/p extubation 1/29, underwent CT A/P 2/2 which showed large LUQ collection and multiple smaller intraabdominal collection s/p IR drainage 2/4      Allergies: No Known Allergies      MEDICATIONS:   --------------------------------------------------------------------------------------  Neurologic Medications  acetaminophen    Suspension .. 650 milliGRAM(s) Oral every 6 hours PRN Temp greater or equal to 38C (100.4F), Mild Pain (1 - 3)  oxyCODONE    Solution 5 milliGRAM(s) Oral every 4 hours PRN Moderate Pain (4 - 6)  oxyCODONE    Solution 10 milliGRAM(s) Oral every 4 hours PRN Severe Pain (7 - 10)    Respiratory Medications    Cardiovascular Medications    Gastrointestinal Medications  pantoprazole   Suspension 40 milliGRAM(s) Oral daily    Genitourinary Medications    Hematologic/Oncologic Medications  enoxaparin Injectable 40 milliGRAM(s) SubCutaneous daily    Antimicrobial/Immunologic Medications  meropenem  IVPB 1000 milliGRAM(s) IV Intermittent every 8 hours  micafungin IVPB      micafungin IVPB 150 milliGRAM(s) IV Intermittent every 24 hours  vancomycin  IVPB      vancomycin  IVPB 750 milliGRAM(s) IV Intermittent every 12 hours    Endocrine/Metabolic Medications  insulin lispro (HumaLOG) corrective regimen sliding scale   SubCutaneous every 6 hours  octreotide  Injectable 100 MICROGram(s) SubCutaneous every 8 hours    Topical/Other Medications  artificial  tears Solution 1 Drop(s) Both EYES every 12 hours  chlorhexidine 4% Liquid 1 Application(s) Topical <User Schedule>    --------------------------------------------------------------------------------------  ICU Vital Signs Last 24 Hrs  T(C): 38.4 (09 Feb 2019 00:00), Max: 38.7 (08 Feb 2019 16:00)  T(F): 101.2 (09 Feb 2019 00:00), Max: 101.6 (08 Feb 2019 16:00)  HR: 105 (09 Feb 2019 00:00) (96 - 109)  BP: --  BP(mean): --  ABP: 157/75 (09 Feb 2019 00:00) (133/61 - 168/80)  ABP(mean): 109 (09 Feb 2019 00:00) (91 - 117)  RR: 19 (09 Feb 2019 00:00) (14 - 29)  SpO2: 96% (09 Feb 2019 00:00) (94% - 98%)    --------------------------------------------------------------------------------------  I&O's Detail    07 Feb 2019 07:01  -  08 Feb 2019 07:00  --------------------------------------------------------  IN:    Enteral Tube Flush: 130 mL    Glucerna: 1067 mL    IV PiggyBack: 1000 mL  Total IN: 2197 mL    OUT:    Bulb: 125 mL    Bulb: 125 mL    Drain: 10 mL    Indwelling Catheter - Urethral: 200 mL    VAC (Vacuum Assisted Closure) System: 350 mL    Voided: 200 mL  Total OUT: 1010 mL    Total NET: 1187 mL      08 Feb 2019 07:01  -  09 Feb 2019 01:07  --------------------------------------------------------  IN:    Enteral Tube Flush: 120 mL    Glucerna: 675 mL    IV PiggyBack: 150 mL  Total IN: 945 mL    OUT:    Bulb: 60 mL    Bulb: 45 mL    VAC (Vacuum Assisted Closure) System: 200 mL    Voided: 1975 mL  Total OUT: 2280 mL    Total NET: -1335 mL        --------------------------------------------------------------------------------------    EXAM  NEUROLOGY  NAD, oriented x 3, no focal deficit, follows commands     RESPIRATORY  Exam: Lungs clear to auscultation, nonlabored breathing     CARDIOVASCULAR  Exam: S1, S2. Regular rate and rhythm.     GI/NUTRITION  Exam: Abdomen soft, mildly tender, midline with VAC dressing in place, R JOURDAN drain bilious output, left JOURDAN drain with bilious drainage, IR drain with serosanguinous drainage.     VASCULAR  Exam: Extremities warm, pink, well-perfused.    MUSCULOSKELETAL  Exam: All extremities moving spontaneously without limitations.     SKIN  Exam: Good skin turgor, no skin breakdown.     METABOLIC/FLUIDS/ELECTROLYTES      HEMATOLOGIC  [x] VTE Prophylaxis: enoxaparin Injectable 40 milliGRAM(s) SubCutaneous daily    Transfusions:	[] PRBC	[] Platelets		[] FFP	[] Cryoprecipitate    INFECTIOUS DISEASE  Antimicrobials/Immunologic Medications:  meropenem  IVPB 1000 milliGRAM(s) IV Intermittent every 8 hours  micafungin IVPB      micafungin IVPB 150 milliGRAM(s) IV Intermittent every 24 hours  vancomycin  IVPB      vancomycin  IVPB 750 milliGRAM(s) IV Intermittent every 12 hours    Tubes/Lines/Drains  JOURDAN X 2, IR drain, VAC dressing  [x] Peripheral IV  [x] Arterial Line		[] R	[] L	[] Fem	[] Rad	[x] Ax	Date Placed:   [x] Urinary Catheter		  [x] Necessity of urinary, arterial, and venous catheters discussed    LABS  --------------------------------------------------------------------------------------  CBC (02-08 @ 03:00)                              8.8<L>                         16.71<H>  )----------------(  251        69.5  % Neutrophils, 12.8<L>% Lymphocytes, ANC: 11.60<H>                              27.5<L>    BMP (02-08 @ 03:00)             138     |  102     |  13    		Ca++ --      Ca 7.8<L>             ---------------------------------( 124<H>		Mg 2.0                4.1     |  24      |  0.47<L>			Ph 2.5       LFTs (02-08 @ 03:00)      TPro 5.8<L> / Alb 1.9<L> / TBili 3.1<H> / DBili 2.2<H> / AST 38<H> / ALT 16 / AlkPhos 340<H>      --------------------------------------------------------------------------------------  Critical Care Diagnoses:

## 2019-02-10 NOTE — PROGRESS NOTE ADULT - SUBJECTIVE AND OBJECTIVE BOX
General Surgery Progress Note    SUBJECTIVE:  The patient was seen and examined. No acute events overnight. Pain well controlled. Midline wound vac changed yesterday. Febrile ON, blood recultured. +gas, +BM. Tiger tube in place.    OBJECTIVE:     ** VITAL SIGNS / I&O's **    Vital Signs Last 24 Hrs  T(C): 37.6 (10 Feb 2019 08:00), Max: 38.6 (2019 20:00)  T(F): 99.6 (10 Feb 2019 08:00), Max: 101.4 (2019 20:00)  HR: 93 (10 Feb 2019 08:00) (85 - 110)  BP: --  BP(mean): --  RR: 18 (10 Feb 2019 08:00) (13 - 28)  SpO2: 98% (10 Feb 2019 08:00) (95% - 99%)      2019 07:01  -  10 Feb 2019 07:00  --------------------------------------------------------  IN:    Enteral Tube Flush: 210 mL    Glucerna: 1080 mL    IV PiggyBack: 850 mL  Total IN: 2140 mL    OUT:    Bulb: 30 mL    Bulb: 55 mL    Drain: 10 mL    VAC (Vacuum Assisted Closure) System: 300 mL    Voided: 650 mL  Total OUT: 1045 mL    Total NET: 1095 mL          ** PHYSICAL EXAM **    -- CONSTITUTIONAL: Alert, NAD, Tiger tube in place  -- PULMONARY: non-labored respirations  -- ABDOMEN: soft, NTND, midline vac in place, RLQ and LLQ JOURDAN with bilious output that is more serous today, LUQ drain with SS output, no rebound or guarding    ** LABS **                          8.7    17.69 )-----------( 332      ( 10 Feb 2019 02:25 )             27.7     10 Feb 2019 02:25    134    |  97     |  13     ----------------------------<  153    4.3     |  25     |  0.46     Ca    7.7        10 Feb 2019 02:25  Phos  2.4       10 Feb 2019 02:25  Mg     1.9       10 Feb 2019 02:25    TPro  5.8    /  Alb  1.7    /  TBili  2.5    /  DBili  1.7    /  AST  38     /  ALT  18     /  AlkPhos  500    10 Feb 2019 02:25    PT/INR - ( 10 Feb 2019 02:25 )   PT: 15.8 SEC;   INR: 1.37          PTT - ( 10 Feb 2019 02:25 )  PTT:32.2 SEC  CAPILLARY BLOOD GLUCOSE      POCT Blood Glucose.: 171 mg/dL (10 Feb 2019 05:53)  POCT Blood Glucose.: 144 mg/dL (2019 23:29)  POCT Blood Glucose.: 120 mg/dL (2019 18:25)  POCT Blood Glucose.: 151 mg/dL (2019 12:31)        LIVER FUNCTIONS - ( 10 Feb 2019 02:25 )  Alb: 1.7 g/dL / Pro: 5.8 g/dL / ALK PHOS: 500 u/L / ALT: 18 u/L / AST: 38 u/L / GGT: x             Urinalysis Basic - ( 2019 13:35 )    Color: DARK YELLOW / Appearance: Lt TURBID / S.027 / pH: 6.0  Gluc: NEGATIVE / Ketone: NEGATIVE  / Bili: TRACE / Urobili: TRACE   Blood: SMALL / Protein: 100 / Nitrite: NEGATIVE   Leuk Esterase: NEGATIVE / RBC: 3-5 / WBC 0-2   Sq Epi: FEW / Non Sq Epi: x / Bacteria: NEGATIVE        MEDICATIONS  (STANDING):  artificial  tears Solution 1 Drop(s) Both EYES every 12 hours  chlorhexidine 4% Liquid 1 Application(s) Topical <User Schedule>  enoxaparin Injectable 40 milliGRAM(s) SubCutaneous daily  insulin lispro (HumaLOG) corrective regimen sliding scale   SubCutaneous every 6 hours  meropenem  IVPB 1000 milliGRAM(s) IV Intermittent every 8 hours  micafungin IVPB      micafungin IVPB 150 milliGRAM(s) IV Intermittent every 24 hours  octreotide  Injectable 100 MICROGram(s) SubCutaneous every 8 hours  pantoprazole   Suspension 40 milliGRAM(s) Oral daily  vancomycin  IVPB      vancomycin  IVPB 750 milliGRAM(s) IV Intermittent every 12 hours    MEDICATIONS  (PRN):  acetaminophen    Suspension .. 650 milliGRAM(s) Oral every 6 hours PRN Temp greater or equal to 38C (100.4F), Mild Pain (1 - 3)  oxyCODONE    Solution 5 milliGRAM(s) Oral every 4 hours PRN Moderate Pain (4 - 6)  oxyCODONE    Solution 10 milliGRAM(s) Oral every 4 hours PRN Severe Pain (7 - 10)

## 2019-02-10 NOTE — PROGRESS NOTE ADULT - ASSESSMENT
ASSESSMENT:  54y Female PMHx HTN, T2DM, asthma, depression with recent diagnosis of invasive ampullary adenocarcinoma of the CBD admitted for cholangitis on 1/5, s/p ERCP 1/8, whipple 1/11, rapid response for GIB on floor 1/18 s/p MTP, RTOR s/p celiotomy, evacuation of hematoma, GDA bleed stopped and sutured, Abthera vac. Closure of abdomen with strattice mesh on 1/21, with inna drains and left lower JOURDAN drain in subcutaneous. Incisional bleed 1/23, bedside skin staples removed, JOURDAN drain removed, hemorrhage controlled, respiratory failure s/p extubation 1/29, underwent CT A/P 2/2 which showed large LUQ collection and multiple smaller intraabdominal collections s/p IR drainage 2/4 with placement of drain, now with abdominal VAC in place  ?  Neurology:  -Tylenol & Oxy and Dilaudid PRN for pain  -stable     Resp:  -CXR overloaded  -likely non cardiogenic edema   -continue to monitor fluid status  -97% on 2L NC   -OOB, pulmonary toilet   ?  CV:  - goal MAP > 65  - likely hypervolemic - stable  ?  GI:  - NPO, Tigertube on TFs (Glucerna)  - Bowel regimen  - Increased secretions in drains green/brown in color, vac placed by plastics, monitor output   - vac change today  - c/w octreotide increased bilious output by drain   - protonix (home med)   - LFTs stable   ?  Renal:  - monitor lytes, replete as needed  - IVF on hold, will resuscitate as needed  - c/w diuresis as tolerated, monitor daily   ?  Heme:  -monitor H/H, transfuse if <7   -ct lovenox vte ppx  ?  ID:  - Febrile, uptrending leukocytosis on vanco (2/3), Diflucan (2/3) --> micafungin (2/5), and meropenem (2/2)  - likely bile leaking causing pancreatitis vs peritonitis   - Vanco trough 2/5 pm 15, due 2/7 18.5. F/u vanco trough today  - U/A neg, blood cx neg 2/1, reculture today  - c-diff negative  - continue to trend WBC+  - cx from drainage sent, only WBC no organisms     Endo  - ISS A1C8.0   - FS q6   ?  Dispo: SICU    Critcal Care Diagnosis: Septic shock, respiratory abnormality, malnutrition, peritonitis, abdominal abscess    --------------------------------------------------------------------------------------

## 2019-02-10 NOTE — PROGRESS NOTE ADULT - ATTENDING COMMENTS
Current Issues:  R18.8 Abdominal fluid collection   - drain output appropriate.  Continuing on broad spectrum abx.  Cultures sent yesterday, results pending.  HD stable.  If continues to spike fevers, or if decompensates in any significant way, will check CT  K65.9 Peritonitis   - as above  R50.9 Fever, unspecified fever cause   - as above  Z91.89 At risk for malnutrition   - continuing tiger tube feeds.  Trying to feed orally also, but patient not cooperating  D64.9 Anemia, unspecified type  - no signs of clinically significant bleeding on exam

## 2019-02-11 LAB
ANION GAP SERPL CALC-SCNC: 9 MMO/L — SIGNIFICANT CHANGE UP (ref 7–14)
APTT BLD: 32.2 SEC — SIGNIFICANT CHANGE UP (ref 27.5–36.3)
BACTERIA UR CULT: SIGNIFICANT CHANGE UP
BASE EXCESS BLDA CALC-SCNC: 6.8 MMOL/L — SIGNIFICANT CHANGE UP
BUN SERPL-MCNC: 12 MG/DL — SIGNIFICANT CHANGE UP (ref 7–23)
CA-I BLDA-SCNC: 1.09 MMOL/L — LOW (ref 1.15–1.29)
CALCIUM SERPL-MCNC: 7.7 MG/DL — LOW (ref 8.4–10.5)
CHLORIDE SERPL-SCNC: 94 MMOL/L — LOW (ref 98–107)
CO2 SERPL-SCNC: 28 MMOL/L — SIGNIFICANT CHANGE UP (ref 22–31)
CREAT SERPL-MCNC: 0.42 MG/DL — LOW (ref 0.5–1.3)
GLUCOSE BLDA-MCNC: 146 MG/DL — HIGH (ref 70–99)
GLUCOSE SERPL-MCNC: 148 MG/DL — HIGH (ref 70–99)
HCO3 BLDA-SCNC: 31 MMOL/L — HIGH (ref 22–26)
HCT VFR BLD CALC: 26.7 % — LOW (ref 34.5–45)
HCT VFR BLDA CALC: 26.9 % — LOW (ref 34.5–46.5)
HGB BLD-MCNC: 8.5 G/DL — LOW (ref 11.5–15.5)
HGB BLDA-MCNC: 8.7 G/DL — LOW (ref 11.5–15.5)
INR BLD: 1.36 — HIGH (ref 0.88–1.17)
LACTATE BLDA-SCNC: 3 MMOL/L — HIGH (ref 0.5–2)
MAGNESIUM SERPL-MCNC: 1.7 MG/DL — SIGNIFICANT CHANGE UP (ref 1.6–2.6)
MCHC RBC-ENTMCNC: 28 PG — SIGNIFICANT CHANGE UP (ref 27–34)
MCHC RBC-ENTMCNC: 31.8 % — LOW (ref 32–36)
MCV RBC AUTO: 87.8 FL — SIGNIFICANT CHANGE UP (ref 80–100)
NRBC # FLD: 0 K/UL — LOW (ref 25–125)
PCO2 BLDA: 40 MMHG — SIGNIFICANT CHANGE UP (ref 32–48)
PH BLDA: 7.49 PH — HIGH (ref 7.35–7.45)
PHOSPHATE SERPL-MCNC: 2.2 MG/DL — LOW (ref 2.5–4.5)
PLATELET # BLD AUTO: 367 K/UL — SIGNIFICANT CHANGE UP (ref 150–400)
PMV BLD: 10.5 FL — SIGNIFICANT CHANGE UP (ref 7–13)
PO2 BLDA: 69 MMHG — LOW (ref 83–108)
POTASSIUM BLDA-SCNC: 3.6 MMOL/L — SIGNIFICANT CHANGE UP (ref 3.4–4.5)
POTASSIUM SERPL-MCNC: 3.9 MMOL/L — SIGNIFICANT CHANGE UP (ref 3.5–5.3)
POTASSIUM SERPL-SCNC: 3.9 MMOL/L — SIGNIFICANT CHANGE UP (ref 3.5–5.3)
PROTHROM AB SERPL-ACNC: 15.7 SEC — HIGH (ref 9.8–13.1)
RBC # BLD: 3.04 M/UL — LOW (ref 3.8–5.2)
RBC # FLD: 17.1 % — HIGH (ref 10.3–14.5)
SAO2 % BLDA: 94.5 % — LOW (ref 95–99)
SODIUM BLDA-SCNC: 130 MMOL/L — LOW (ref 136–146)
SODIUM SERPL-SCNC: 131 MMOL/L — LOW (ref 135–145)
SPECIMEN SOURCE: SIGNIFICANT CHANGE UP
VANCOMYCIN TROUGH SERPL-MCNC: 15.7 UG/ML — SIGNIFICANT CHANGE UP (ref 10–20)
WBC # BLD: 18.83 K/UL — HIGH (ref 3.8–10.5)
WBC # FLD AUTO: 18.83 K/UL — HIGH (ref 3.8–10.5)

## 2019-02-11 PROCEDURE — 99233 SBSQ HOSP IP/OBS HIGH 50: CPT

## 2019-02-11 RX ORDER — HYDROMORPHONE HYDROCHLORIDE 2 MG/ML
0.5 INJECTION INTRAMUSCULAR; INTRAVENOUS; SUBCUTANEOUS ONCE
Qty: 0 | Refills: 0 | Status: DISCONTINUED | OUTPATIENT
Start: 2019-02-11 | End: 2019-02-11

## 2019-02-11 RX ORDER — MAGNESIUM SULFATE 500 MG/ML
2 VIAL (ML) INJECTION ONCE
Qty: 0 | Refills: 0 | Status: COMPLETED | OUTPATIENT
Start: 2019-02-11 | End: 2019-02-11

## 2019-02-11 RX ORDER — LIDOCAINE HCL 20 MG/ML
30 VIAL (ML) INJECTION ONCE
Qty: 0 | Refills: 0 | Status: DISCONTINUED | OUTPATIENT
Start: 2019-02-11 | End: 2019-02-11

## 2019-02-11 RX ADMIN — CHLORHEXIDINE GLUCONATE 1 APPLICATION(S): 213 SOLUTION TOPICAL at 08:59

## 2019-02-11 RX ADMIN — Medication 50 GRAM(S): at 08:58

## 2019-02-11 RX ADMIN — Medication 650 MILLIGRAM(S): at 23:38

## 2019-02-11 RX ADMIN — OXYCODONE HYDROCHLORIDE 10 MILLIGRAM(S): 5 TABLET ORAL at 06:40

## 2019-02-11 RX ADMIN — ENOXAPARIN SODIUM 40 MILLIGRAM(S): 100 INJECTION SUBCUTANEOUS at 11:38

## 2019-02-11 RX ADMIN — OCTREOTIDE ACETATE 100 MICROGRAM(S): 200 INJECTION, SOLUTION INTRAVENOUS; SUBCUTANEOUS at 13:27

## 2019-02-11 RX ADMIN — Medication 2: at 05:37

## 2019-02-11 RX ADMIN — MEROPENEM 100 MILLIGRAM(S): 1 INJECTION INTRAVENOUS at 22:01

## 2019-02-11 RX ADMIN — Medication 650 MILLIGRAM(S): at 19:01

## 2019-02-11 RX ADMIN — Medication 250 MILLIGRAM(S): at 05:51

## 2019-02-11 RX ADMIN — MICAFUNGIN SODIUM 107.5 MILLIGRAM(S): 100 INJECTION, POWDER, LYOPHILIZED, FOR SOLUTION INTRAVENOUS at 11:07

## 2019-02-11 RX ADMIN — Medication 1 DROP(S): at 05:22

## 2019-02-11 RX ADMIN — OXYCODONE HYDROCHLORIDE 10 MILLIGRAM(S): 5 TABLET ORAL at 13:36

## 2019-02-11 RX ADMIN — HYDROMORPHONE HYDROCHLORIDE 0.5 MILLIGRAM(S): 2 INJECTION INTRAMUSCULAR; INTRAVENOUS; SUBCUTANEOUS at 10:10

## 2019-02-11 RX ADMIN — MEROPENEM 100 MILLIGRAM(S): 1 INJECTION INTRAVENOUS at 05:22

## 2019-02-11 RX ADMIN — OXYCODONE HYDROCHLORIDE 10 MILLIGRAM(S): 5 TABLET ORAL at 06:04

## 2019-02-11 RX ADMIN — HYDROMORPHONE HYDROCHLORIDE 0.5 MILLIGRAM(S): 2 INJECTION INTRAMUSCULAR; INTRAVENOUS; SUBCUTANEOUS at 09:22

## 2019-02-11 RX ADMIN — OCTREOTIDE ACETATE 100 MICROGRAM(S): 200 INJECTION, SOLUTION INTRAVENOUS; SUBCUTANEOUS at 05:22

## 2019-02-11 RX ADMIN — OCTREOTIDE ACETATE 100 MICROGRAM(S): 200 INJECTION, SOLUTION INTRAVENOUS; SUBCUTANEOUS at 22:01

## 2019-02-11 RX ADMIN — Medication 650 MILLIGRAM(S): at 15:27

## 2019-02-11 RX ADMIN — OXYCODONE HYDROCHLORIDE 10 MILLIGRAM(S): 5 TABLET ORAL at 13:45

## 2019-02-11 RX ADMIN — Medication 2: at 11:38

## 2019-02-11 RX ADMIN — MEROPENEM 100 MILLIGRAM(S): 1 INJECTION INTRAVENOUS at 13:27

## 2019-02-11 RX ADMIN — PANTOPRAZOLE SODIUM 40 MILLIGRAM(S): 20 TABLET, DELAYED RELEASE ORAL at 11:39

## 2019-02-11 NOTE — PROGRESS NOTE ADULT - ASSESSMENT
ASSESSMENT:  54y Female PMHx HTN, T2DM, asthma, depression with recent diagnosis of invasive ampullary adenocarcinoma of the CBD admitted for cholangitis on 1/5, s/p ERCP 1/8, whipple 1/11, rapid response for GIB on floor 1/18 s/p MTP, RTOR s/p celiotomy, evacuation of hematoma, GDA bleed stopped and sutured, Abthera vac. Closure of abdomen with strattice mesh on 1/21, with inna drains and left lower JOURDAN drain in subcutaneous. Incisional bleed 1/23, bedside skin staples removed, JOURDAN drain removed, hemorrhage controlled, respiratory failure s/p extubation 1/29, underwent CT A/P 2/2 which showed large LUQ collection and multiple smaller intraabdominal collections s/p IR drainage 2/4 with placement of drain, now with abdominal VAC in place  ?  Neurology:  -Tylenol & Oxy and Dilaudid PRN for pain    Resp:  -supplemental O2 prn to maintain sat > 94%  -OOB, pulmonary toilet   ?  CV:  - goal MAP > 65  - continue close hemodynamic monitoring   ?  GI:  - Tigertube on TFs (Glucerna), PO intake around tube  - Bowel regimen  - c/w octreotide increased bilious output by drain   - protonix (home med)   - LFTs stable   ?  Renal:  - monitor lytes, replete as needed  - IVF on hold, will resuscitate as needed  ?  Heme:  -monitor H/H, transfuse if <7   -lovenox for vte ppx  ?  ID:  - Febrile, uptrending leukocytosis on vanco (2/3), Diflucan (2/3) --> micafungin (2/5), and meropenem (2/2)  - U/A neg, blood cx neg 2/9 ngtd  - c-diff negative  - continue to trend WBC+  - consider repeat CT c/a/p    Endo  - ISS A1C8.0   - FS q6   ?  Dispo: SICU    Critcal Care Diagnosis: Septic shock, respiratory abnormality, malnutrition, peritonitis, abdominal abscess ASSESSMENT:  54y Female PMHx HTN, T2DM, asthma, depression with recent diagnosis of invasive ampullary adenocarcinoma of the CBD admitted for cholangitis on 1/5, s/p ERCP 1/8, whipple 1/11, rapid response for GIB on floor 1/18 s/p MTP, RTOR s/p celiotomy, evacuation of hematoma, GDA bleed stopped and sutured, Abthera vac. Closure of abdomen with strattice mesh on 1/21, with inna drains and left lower JOURDAN drain in subcutaneous. Incisional bleed 1/23, bedside skin staples removed, JOURDAN drain removed, hemorrhage controlled, respiratory failure s/p extubation 1/29, underwent CT A/P 2/2 which showed large LUQ collection and multiple smaller intraabdominal collections s/p IR drainage 2/4 with placement of drain, now with abdominal VAC in place  ?  Neurology:  -Tylenol & Oxy and Dilaudid PRN for pain    Resp:  -supplemental O2 prn to maintain sat > 94%  -OOB, pulmonary toilet   ?  CV:  - goal MAP > 65  - continue close hemodynamic monitoring   ?  GI:  - Tigertube on TFs (Glucerna), PO intake around tube  - Bowel regimen  - c/w octreotide increased bilious output by drain   - protonix (home med)   - LFTs stable   - send amylase from L JOURDAN   - will get CTA&P soon to assess for collections   ?  Renal:  - monitor lytes, replete as needed  - IVF on hold, will resuscitate as needed  - hyponatremia, worsening   ?  Heme:  -monitor H/H, transfuse if <7   -lovenox for vte ppx  ?  ID:  - Febrile, uptrending leukocytosis on vanco (2/3), Diflucan (2/3) --> micafungin (2/5), and meropenem (2/2)  - add procalcitonin   - U/A neg, blood cx neg 2/9 ngtd  - c-diff negative  - continue to trend WBC+  - consider repeat CT c/a/p    Endo  - ISS A1C8.0   - FS q6   ?  Dispo: SICU    Critcal Care Diagnosis: Septic shock, respiratory abnormality, malnutrition, peritonitis, abdominal abscess

## 2019-02-11 NOTE — ADVANCED PRACTICE NURSE CONSULT - REASON FOR CONSULT
Spoke with plastic service line: plastic team changing NPWT/VAC today. Request WO service line to start changing NPWT/VAC dressing on Wednesday. Team will continue to follow up on Wednesday.

## 2019-02-11 NOTE — PROGRESS NOTE ADULT - SUBJECTIVE AND OBJECTIVE BOX
SURGERY DAILY PROGRESS NOTE:       SUBJECTIVE/ROS: Patient examined at bedside. No acute events overnight.          MEDICATIONS  (STANDING):  artificial  tears Solution 1 Drop(s) Both EYES every 12 hours  chlorhexidine 4% Liquid 1 Application(s) Topical <User Schedule>  enoxaparin Injectable 40 milliGRAM(s) SubCutaneous daily  insulin lispro (HumaLOG) corrective regimen sliding scale   SubCutaneous every 6 hours  lidocaine 1%/EPINEPHrine 1:100,000 Inj 30 milliLiter(s) Local Injection daily  meropenem  IVPB 1000 milliGRAM(s) IV Intermittent every 8 hours  micafungin IVPB      micafungin IVPB 150 milliGRAM(s) IV Intermittent every 24 hours  octreotide  Injectable 100 MICROGram(s) SubCutaneous every 8 hours  pantoprazole   Suspension 40 milliGRAM(s) Oral daily  vancomycin  IVPB      vancomycin  IVPB 750 milliGRAM(s) IV Intermittent every 12 hours    MEDICATIONS  (PRN):  acetaminophen    Suspension .. 650 milliGRAM(s) Oral every 6 hours PRN Temp greater or equal to 38C (100.4F), Mild Pain (1 - 3)  oxyCODONE    Solution 5 milliGRAM(s) Oral every 4 hours PRN Moderate Pain (4 - 6)  oxyCODONE    Solution 10 milliGRAM(s) Oral every 4 hours PRN Severe Pain (7 - 10)      OBJECTIVE:    Vital Signs Last 24 Hrs  T(C): 38.1 (2019 08:00), Max: 38.6 (10 Feb 2019 17:00)  T(F): 100.6 (2019 08:00), Max: 101.4 (10 Feb 2019 17:00)  HR: 107 (2019 11:00) (86 - 107)  BP: 145/68 (2019 09:00) (132/58 - 145/68)  BP(mean): 101 (2019 09:00) (76 - 101)  RR: 24 (2019 11:00) (15 - 31)  SpO2: 98% (2019 11:00) (94% - 100%)        I&O's Detail    10 Feb 2019 07:01  -  2019 07:00  --------------------------------------------------------  IN:    Enteral Tube Flush: 40 mL    Glucerna: 1080 mL    IV PiggyBack: 500 mL  Total IN: 1620 mL    OUT:    Bulb: 55 mL    Bulb: 40 mL    Drain: 15 mL    VAC (Vacuum Assisted Closure) System: 500 mL    Voided: 1300 mL  Total OUT: 1910 mL    Total NET: -290 mL      2019 07:01  -  2019 12:01  --------------------------------------------------------  IN:    Glucerna: 135 mL    IV PiggyBack: 50 mL  Total IN: 185 mL    OUT:    Bulb: 10 mL    Voided: 200 mL  Total OUT: 210 mL    Total NET: -25 mL          Daily     Daily Weight in k.5 (2019 05:00)    LABS:                        8.5    18.83 )-----------( 367      ( 2019 03:00 )             26.7     02-11    131<L>  |  94<L>  |  12  ----------------------------<  148<H>  3.9   |  28  |  0.42<L>    Ca    7.7<L>      2019 03:00  Phos  2.2     02-11  Mg     1.7     02-11    TPro  5.8<L>  /  Alb  1.7<L>  /  TBili  2.5<H>  /  DBili  1.7<H>  /  AST  38<H>  /  ALT  18  /  AlkPhos  500<H>  02-10    PT/INR - ( 2019 03:00 )   PT: 15.7 SEC;   INR: 1.36          PTT - ( 2019 03:00 )  PTT:32.2 SEC  Urinalysis Basic - ( 2019 13:35 )    Color: DARK YELLOW / Appearance: Lt TURBID / S.027 / pH: 6.0  Gluc: NEGATIVE / Ketone: NEGATIVE  / Bili: TRACE / Urobili: TRACE   Blood: SMALL / Protein: 100 / Nitrite: NEGATIVE   Leuk Esterase: NEGATIVE / RBC: 3-5 / WBC 0-2   Sq Epi: FEW / Non Sq Epi: x / Bacteria: NEGATIVE      ** PHYSICAL EXAM **    -- CONSTITUTIONAL: Alert, NAD, Tiger tube in place  -- PULMONARY: non-labored respirations  -- ABDOMEN: soft, NTND, midline vac in place, RLQ and LLQ JOURDAN with bilious output that is more serous today, LUQ drain with SS output, no rebound or guarding

## 2019-02-11 NOTE — PROGRESS NOTE ADULT - SUBJECTIVE AND OBJECTIVE BOX
HISTORY  54y Female PMHx HTN, T2DM, asthma, depression with recent diagnosis of invasive ampullary adenocarcinoma of the CBD admitted for cholangitis on 1/5, s/p ERCP 1/8, whipple 1/11, rapid response for GIB on floor 1/18 s/p MTP, RTOR s/p celiotomy, evacuation of hematoma, GDA bleed stopped and sutured, Abthera vac. Closure of abdomen with strattice mesh on 1/21, with inna drains and left lower JOURDAN drain in subcutaneous, s/p incisional bleed 1/23, bedside skin staples removed, JOURDAN drain removed, hemorrhage controlled, for continuation of ICU care for respiratory failure requiring intubation s/p extubation 1/29, underwent CT A/P 2/2 which showed large LUQ collection and multiple smaller intraabdominal collection s/p IR drainage 2/4    24 HOUR EVENTS: Patient started on PO diet around tiger tube to optimize nutritional status.  Encouraged to get OOB to chair.    SUBJECTIVE/ROS:  [x] A ten-point review of systems was otherwise negative except as noted.  [ ] Due to altered mental status/intubation, subjective information were not able to be obtained from the patient. History was obtained, to the extent possible, from review of the chart and collateral sources of information.      NEURO  RASS:     GCS:     [facility] ICU:  Exam: awake, alert, oriented  Meds: acetaminophen    Suspension .. 650 milliGRAM(s) Oral every 6 hours PRN Temp greater or equal to 38C (100.4F), Mild Pain (1 - 3)  oxyCODONE    Solution 5 milliGRAM(s) Oral every 4 hours PRN Moderate Pain (4 - 6)  oxyCODONE    Solution 10 milliGRAM(s) Oral every 4 hours PRN Severe Pain (7 - 10)    [x] Adequacy of sedation and pain control has been assessed and adjusted      RESPIRATORY  RR: 27 (02-11-19 @ 01:00) (14 - 31)  SpO2: 98% (02-11-19 @ 01:00) (94% - 100%)  Wt(kg): --  Exam: unlabored, clear to auscultation bilaterally  Mechanical Ventilation:   ABG - ( 10 Feb 2019 02:25 )  pH: 7.43  /  pCO2: 45    /  pO2: 80    / HCO3: 29    / Base Excess: 5.0   /  SaO2: 96.2    Lactate: 3.3        CARDIOVASCULAR  HR: 97 (02-11-19 @ 01:00) (86 - 102)  BP: --  BP(mean): --  ABP: 148/69 (02-11-19 @ 01:00) (126/58 - 164/79)  ABP(mean): 101 (02-11-19 @ 01:00) (84 - 113)  Wt(kg): --  CVP(cm H2O): --      Exam: regular rate and rhythm  Cardiac Rhythm: sinus  Perfusion     [x]Adequate   [ ]Inadequate  Mentation   [x]Normal       [ ]Reduced  Extremities  [x]Warm         [ ]Cool  Volume Status [ ]Hypervolemic [x]Euvolemic [ ]Hypovolemic  Meds:       GI/NUTRITION  Exam: soft, nontender, nondistended, incision C/D/I with midline vac in place, JOURDAN x2 bilious, IR drain serosanguinous  Diet: TF via tiger tube, PO clears  Meds: pantoprazole   Suspension 40 milliGRAM(s) Oral daily      GENITOURINARY  I&O's Detail    02-09 @ 07:01  -  02-10 @ 07:00  --------------------------------------------------------  IN:    Enteral Tube Flush: 210 mL    Glucerna: 1080 mL    IV PiggyBack: 850 mL  Total IN: 2140 mL    OUT:    Bulb: 55 mL    Bulb: 30 mL    Drain: 10 mL    VAC (Vacuum Assisted Closure) System: 300 mL    Voided: 650 mL  Total OUT: 1045 mL    Total NET: 1095 mL      02-10 @ 07:01  -  02-11 @ 02:02  --------------------------------------------------------  IN:    Glucerna: 810 mL    IV PiggyBack: 200 mL  Total IN: 1010 mL    OUT:    Bulb: 25 mL    Bulb: 10 mL    Drain: 15 mL    VAC (Vacuum Assisted Closure) System: 200 mL    Voided: 1200 mL  Total OUT: 1450 mL    Total NET: -440 mL          02-10    134<L>  |  97<L>  |  13  ----------------------------<  153<H>  4.3   |  25  |  0.46<L>    Ca    7.7<L>      10 Feb 2019 02:25  Phos  2.4     02-10  Mg     1.9     02-10    TPro  5.8<L>  /  Alb  1.7<L>  /  TBili  2.5<H>  /  DBili  1.7<H>  /  AST  38<H>  /  ALT  18  /  AlkPhos  500<H>  02-10        HEMATOLOGIC  Meds: enoxaparin Injectable 40 milliGRAM(s) SubCutaneous daily    [x] VTE Prophylaxis                        8.7    17.69 )-----------( 332      ( 10 Feb 2019 02:25 )             27.7     PT/INR - ( 10 Feb 2019 02:25 )   PT: 15.8 SEC;   INR: 1.37          PTT - ( 10 Feb 2019 02:25 )  PTT:32.2 SEC  Transfusion     [ ] PRBC   [ ] Platelets   [ ] FFP   [ ] Cryoprecipitate      INFECTIOUS DISEASES  WBC Count: 17.69 K/uL (02-10 @ 02:25)    RECENT CULTURES:  Specimen Source: BLOOD  Date/Time: 02-09 @ 12:06  Culture Results: --  Gram Stain: --  Organism: --    Meds: meropenem  IVPB 1000 milliGRAM(s) IV Intermittent every 8 hours  micafungin IVPB      micafungin IVPB 150 milliGRAM(s) IV Intermittent every 24 hours  vancomycin  IVPB      vancomycin  IVPB 750 milliGRAM(s) IV Intermittent every 12 hours        ENDOCRINE  CAPILLARY BLOOD GLUCOSE  POCT Blood Glucose.: 155 mg/dL (10 Feb 2019 23:31)  POCT Blood Glucose.: 169 mg/dL (10 Feb 2019 17:17)  POCT Blood Glucose.: 200 mg/dL (10 Feb 2019 11:37)  POCT Blood Glucose.: 171 mg/dL (10 Feb 2019 05:53)    Meds: insulin lispro (HumaLOG) corrective regimen sliding scale   SubCutaneous every 6 hours  octreotide  Injectable 100 MICROGram(s) SubCutaneous every 8 hours        ACCESS DEVICES:  [x] Peripheral IV  [ ] Central Venous Line	[ ] R	[ ] L	[ ] IJ	[ ] Fem	[ ] SC	Placed:   [ ] Arterial Line		[ ] R	[ ] L	[ ] Fem	[ ] Rad	[ ] Ax	Placed:   [ ] PICC:					[ ] Mediport  [ ] Urinary Catheter, Date Placed:   [x] Necessity of urinary, arterial, and venous catheters discussed    OTHER MEDICATIONS:  artificial  tears Solution 1 Drop(s) Both EYES every 12 hours  chlorhexidine 4% Liquid 1 Application(s) Topical <User Schedule>

## 2019-02-11 NOTE — CHART NOTE - NSCHARTNOTEFT_GEN_A_CORE
After obtaining informed consent patient's wound was injected with 20cc of 1% lidocaine. Minimal necrotic tissue around margins of incision were debrided. Bile was noted at base of wound. Several vertical mattress sutures were placed at the superior and inferior margins of the wound along with several simple sutures using 2-0 prolene. Middle of incision was packed with xeroform and wound vac sponge. Tissue expander was left in place but dressing was placed under the tissue expander to prevent pressure ulcer progression (stage 2 ulcer noticed under expander). Wound vac was placed over wound and tissue expander. Vac placed on suction, maintained good suction. Patient tolerated the procedure well.     Plan:    - Wound vac change to be performed by Vac team MW. Next Vac change this Wednesday.    - CT abd per SICU to look for intraabdominal collection  - Monitor drain output   - Will continue to follow    [id number]

## 2019-02-11 NOTE — CHART NOTE - NSCHARTNOTEFT_GEN_A_CORE
Pt has invasive ampullary adenocarcinoma s/p Whipple on 1/11/19, with multiple complications. Pt has been referred to [facility]. Awaiting patient's recovery and discharge home to follow up at [facility]. No in patient chemotherapy indicated. Oncology team will sign off for now. Please feel free to call should questions arise.    Obie Peterson MD.  Heme-Onc fellow, PGY 4  [phone]; [id number]

## 2019-02-11 NOTE — PROGRESS NOTE ADULT - SUBJECTIVE AND OBJECTIVE BOX
Plastic Surgery Progress Note       S: AFVSS, no acute events overnight. Will change vac with vac team today and examine wound.     MEDICATIONS  (STANDING):  artificial  tears Solution 1 Drop(s) Both EYES every 12 hours  chlorhexidine 4% Liquid 1 Application(s) Topical <User Schedule>  enoxaparin Injectable 40 milliGRAM(s) SubCutaneous daily  insulin lispro (HumaLOG) corrective regimen sliding scale   SubCutaneous every 6 hours  meropenem  IVPB 1000 milliGRAM(s) IV Intermittent every 8 hours  micafungin IVPB      micafungin IVPB 150 milliGRAM(s) IV Intermittent every 24 hours  octreotide  Injectable 100 MICROGram(s) SubCutaneous every 8 hours  pantoprazole   Suspension 40 milliGRAM(s) Oral daily  vancomycin  IVPB      vancomycin  IVPB 750 milliGRAM(s) IV Intermittent every 12 hours    MEDICATIONS  (PRN):  acetaminophen    Suspension .. 650 milliGRAM(s) Oral every 6 hours PRN Mild Pain (1 - 3)  oxyCODONE    Solution 5 milliGRAM(s) Oral every 4 hours PRN Moderate Pain (4 - 6)  oxyCODONE    Solution 10 milliGRAM(s) Oral every 4 hours PRN Severe Pain (7 - 10)      Physical Exam:    Vital Signs Last 24 Hrs  T(C): 37.3 (11 Feb 2019 04:00), Max: 38.6 (10 Feb 2019 17:00)  T(F): 99.2 (11 Feb 2019 04:00), Max: 101.4 (10 Feb 2019 17:00)  HR: 93 (11 Feb 2019 06:00) (86 - 102)  BP: --  BP(mean): --  RR: 28 (11 Feb 2019 06:00) (15 - 31)  SpO2: 100% (11 Feb 2019 06:00) (94% - 100%)      Abdomen with dermaclose intact  Vac dressing on moderate seal to continuous suction over dermaclose (will change today with vac team and examine wound)      LABS:                             8.5    18.83 )-----------( 367      ( 11 Feb 2019 03:00 )             26.7   02-11    131<L>  |  94<L>  |  12  ----------------------------<  148<H>  3.9   |  28  |  0.42<L>    Ca    7.7<L>      11 Feb 2019 03:00  Phos  2.2     02-11  Mg     1.7     02-11    TPro  5.8<L>  /  Alb  1.7<L>  /  TBili  2.5<H>  /  DBili  1.7<H>  /  AST  38<H>  /  ALT  18  /  AlkPhos  500<H>  02-10

## 2019-02-11 NOTE — PROGRESS NOTE ADULT - ATTENDING COMMENTS
54y Female PMHx HTN, T2DM, asthma, depression with recent diagnosis of invasive ampullary adenocarcinoma of the CBD admitted for cholangitis on 1/5, s/p ERCP 1/8, whipple 1/11, rapid response for GIB on floor 1/18 s/p MTP, RTOR s/p celiotomy, evacuation of hematoma, GDA bleed stopped and sutured, Abthera vac. Closure of abdomen with strattice mesh on 1/21, with inna drains and left lower JOURDAN drain in subcutaneous. Incisional bleed 1/23, bedside skin staples removed, JOURDAN drain removed, hemorrhage controlled, respiratory failure s/p extubation 1/29, underwent CT A/P 2/2 which showed large LUQ collection and multiple smaller intraabdominal collections s/p IR drainage 2/4 with placement of drain, now with abdominal VAC in place  ?  Neurology:  -Tylenol & Oxy and Dilaudid PRN for pain    Resp:  -supplemental O2 prn to maintain sat > 94%  -OOB, pulmonary toilet   ?  CV:  - goal MAP > 65  - continue close hemodynamic monitoring   ?  GI:  - Tigertube on TFs (Glucerna), PO intake around tube  - Bowel regimen  - c/w octreotide increased bilious output by drain   - protonix (home med)   - LFTs stable   - send amylase from L JOURDAN   - will get CTA&P soon to assess for collections   ?  Renal:  - monitor lytes, replete as needed  - IVF on hold, will resuscitate as needed  - hyponatremia, worsening   ?  Heme:  -monitor H/H, transfuse if <7   -lovenox for vte ppx  ?  ID:  - Febrile, uptrending leukocytosis on vanco (2/3), Diflucan (2/3) --> micafungin (2/5), and meropenem (2/2)  - add procalcitonin   - U/A neg, blood cx neg 2/9 ngtd  - c-diff negative  - continue to trend WBC+  - consider repeat CT c/a/p    Endo  - ISS A1C8.0   - FS q6   ?  Dispo: SICU    Critcal Care Diagnosis: Septic shock, respiratory abnormality, malnutrition, peritonitis, abdominal abscess    The patient is a critical care patient with life threatening hemodynamic and metabolic instability in SICU.  I have personally interviewed when possible and examined the patient, reviewed data and laboratory tests/x-rays and all pertinent electronic images.  I was physically present for the key portions of the evaluation and management (E/M) service provided.   The SICU team has a constant risk benefit analyzes discussion with the primary team, all consultants, House Staff and PA's on all decisions.  These diagnoses are unrelated to the surgical procedure noted above.  I meet with family if needed to get further history, discuss the case and make care decisions for this patient who might not be able to participate.  Time involved in performance of separately billable procedures was not counted toward my critical care time. There is no overlap.  I spent 55-75 minutes of critical care time for the diagnoses, assessment, plan and interventions.

## 2019-02-11 NOTE — PROGRESS NOTE ADULT - ASSESSMENT
54y Female PMHx HTN, T2DM, asthma, depression with recent diagnosis of invasive ampullary adenocarcinoma of the CBD admitted for cholangitis on 1/5, s/p ERCP 1/8, whipple 1/11, rapid response for GIB on floor 1/18 s/p MTP, RTOR s/p celiotomy, evacuation of hematoma, GDA bleed stopped and sutured, Abthera vac. Closure of abdomen with strattice mesh on 1/21, with inna drains and left lower JOURDAN drain in subcutaneous. Incisional bleed 1/23, bedside skin staples removed, JOURDAN drain removed, hemorrhage controlled, for continuation of ICU care. S/p interventional radiology drainage of LUQ collection 2/4    PLAN:  -CT abd/pelvis w/ IR tube check   -cont to appreciate SICU care  -pain control as needed  -cont lovenox for DVT ppx  -PT/oob  -cont NPO/IVF/tube feeds via tiger tube; poss PO feeds around tiger tube  -monitor VS  -f/u cultures  -cont current abx   -c/w octreotide      D Team Surgery  [id number]

## 2019-02-11 NOTE — PROGRESS NOTE ADULT - ASSESSMENT
54F with history of cholangitis found to have CBD adenocarcinoma s/p Whipple procedure (on 1/11/2019) c/b GI bleed s/p RTOR for ligation (on 1/18/2019) followed by washout and placement of Strattice mesh (1/21/2019) s/p application of external tissue expander device (on 1/24/2019), s/p wound vac application (2/6/19)    - External tissue expander device to remain in place until sufficient skin laxity to allow for skin closure. Do not remove or otherwise touch this device. It will automatically tighten on its own without external intervention. It will also maintain appropriate tension on the wound over time.   - wound vac changes every 2-3 days ( will change today)  - Nutritional optimization.  - Medical optimization.  - Care per primary team     Plastic Surgery [id number]

## 2019-02-12 LAB
ALBUMIN SERPL ELPH-MCNC: 1.6 G/DL — LOW (ref 3.3–5)
ALP SERPL-CCNC: 785 U/L — HIGH (ref 40–120)
ALT FLD-CCNC: 25 U/L — SIGNIFICANT CHANGE UP (ref 4–33)
ANION GAP SERPL CALC-SCNC: 13 MMO/L — SIGNIFICANT CHANGE UP (ref 7–14)
ANISOCYTOSIS BLD QL: SLIGHT — SIGNIFICANT CHANGE UP
APTT BLD: 31.8 SEC — SIGNIFICANT CHANGE UP (ref 27.5–36.3)
AST SERPL-CCNC: 59 U/L — HIGH (ref 4–32)
BASOPHILS # BLD AUTO: 0.08 K/UL — SIGNIFICANT CHANGE UP (ref 0–0.2)
BASOPHILS NFR BLD AUTO: 0.5 % — SIGNIFICANT CHANGE UP (ref 0–2)
BASOPHILS NFR SPEC: 1.8 % — SIGNIFICANT CHANGE UP (ref 0–2)
BILIRUB DIRECT SERPL-MCNC: 1.4 MG/DL — HIGH (ref 0.1–0.2)
BILIRUB SERPL-MCNC: 2 MG/DL — HIGH (ref 0.2–1.2)
BLASTS # FLD: 0 % — SIGNIFICANT CHANGE UP (ref 0–0)
BLD GP AB SCN SERPL QL: NEGATIVE — SIGNIFICANT CHANGE UP
BUN SERPL-MCNC: 11 MG/DL — SIGNIFICANT CHANGE UP (ref 7–23)
CALCIUM SERPL-MCNC: 7.6 MG/DL — LOW (ref 8.4–10.5)
CHLORIDE SERPL-SCNC: 92 MMOL/L — LOW (ref 98–107)
CO2 SERPL-SCNC: 27 MMOL/L — SIGNIFICANT CHANGE UP (ref 22–31)
CREAT SERPL-MCNC: 0.4 MG/DL — LOW (ref 0.5–1.3)
EOSINOPHIL # BLD AUTO: 0.23 K/UL — SIGNIFICANT CHANGE UP (ref 0–0.5)
EOSINOPHIL NFR BLD AUTO: 1.3 % — SIGNIFICANT CHANGE UP (ref 0–6)
EOSINOPHIL NFR FLD: 1.7 % — SIGNIFICANT CHANGE UP (ref 0–6)
GIANT PLATELETS BLD QL SMEAR: PRESENT — SIGNIFICANT CHANGE UP
GLUCOSE SERPL-MCNC: 160 MG/DL — HIGH (ref 70–99)
GRAM STN WND: SIGNIFICANT CHANGE UP
GRAM STN WND: SIGNIFICANT CHANGE UP
HCG SERPL-ACNC: < 5 MIU/ML — SIGNIFICANT CHANGE UP
HCT VFR BLD CALC: 24.9 % — LOW (ref 34.5–45)
HGB BLD-MCNC: 7.9 G/DL — LOW (ref 11.5–15.5)
IMM GRANULOCYTES NFR BLD AUTO: 1.2 % — SIGNIFICANT CHANGE UP (ref 0–1.5)
INR BLD: 1.38 — HIGH (ref 0.88–1.17)
LYMPHOCYTES # BLD AUTO: 13.1 % — SIGNIFICANT CHANGE UP (ref 13–44)
LYMPHOCYTES # BLD AUTO: 2.26 K/UL — SIGNIFICANT CHANGE UP (ref 1–3.3)
LYMPHOCYTES NFR SPEC AUTO: 6.1 % — LOW (ref 13–44)
MAGNESIUM SERPL-MCNC: 2 MG/DL — SIGNIFICANT CHANGE UP (ref 1.6–2.6)
MCHC RBC-ENTMCNC: 27.9 PG — SIGNIFICANT CHANGE UP (ref 27–34)
MCHC RBC-ENTMCNC: 31.7 % — LOW (ref 32–36)
MCV RBC AUTO: 88 FL — SIGNIFICANT CHANGE UP (ref 80–100)
METAMYELOCYTES # FLD: 0 % — SIGNIFICANT CHANGE UP (ref 0–1)
MONOCYTES # BLD AUTO: 2.55 K/UL — HIGH (ref 0–0.9)
MONOCYTES NFR BLD AUTO: 14.8 % — HIGH (ref 2–14)
MONOCYTES NFR BLD: 10.5 % — HIGH (ref 2–9)
MYELOCYTES NFR BLD: 0 % — SIGNIFICANT CHANGE UP (ref 0–0)
NEUTROPHIL AB SER-ACNC: 78.1 % — HIGH (ref 43–77)
NEUTROPHILS # BLD AUTO: 11.87 K/UL — HIGH (ref 1.8–7.4)
NEUTROPHILS NFR BLD AUTO: 69.1 % — SIGNIFICANT CHANGE UP (ref 43–77)
NEUTS BAND # BLD: 0 % — SIGNIFICANT CHANGE UP (ref 0–6)
NRBC # FLD: 0 K/UL — LOW (ref 25–125)
OTHER - HEMATOLOGY %: 0 — SIGNIFICANT CHANGE UP
PHOSPHATE SERPL-MCNC: 2.3 MG/DL — LOW (ref 2.5–4.5)
PLATELET # BLD AUTO: 348 K/UL — SIGNIFICANT CHANGE UP (ref 150–400)
PLATELET COUNT - ESTIMATE: NORMAL — SIGNIFICANT CHANGE UP
PMV BLD: 10.1 FL — SIGNIFICANT CHANGE UP (ref 7–13)
POLYCHROMASIA BLD QL SMEAR: SLIGHT — SIGNIFICANT CHANGE UP
POTASSIUM SERPL-MCNC: 4.2 MMOL/L — SIGNIFICANT CHANGE UP (ref 3.5–5.3)
POTASSIUM SERPL-SCNC: 4.2 MMOL/L — SIGNIFICANT CHANGE UP (ref 3.5–5.3)
PROCALCITONIN SERPL-MCNC: 0.46 NG/ML — HIGH (ref 0.02–0.1)
PROMYELOCYTES # FLD: 0 % — SIGNIFICANT CHANGE UP (ref 0–0)
PROT SERPL-MCNC: 5.7 G/DL — LOW (ref 6–8.3)
PROTHROM AB SERPL-ACNC: 15.9 SEC — HIGH (ref 9.8–13.1)
RBC # BLD: 2.83 M/UL — LOW (ref 3.8–5.2)
RBC # FLD: 16.8 % — HIGH (ref 10.3–14.5)
RH IG SCN BLD-IMP: POSITIVE — SIGNIFICANT CHANGE UP
SODIUM SERPL-SCNC: 132 MMOL/L — LOW (ref 135–145)
SPECIMEN SOURCE: SIGNIFICANT CHANGE UP
SPECIMEN SOURCE: SIGNIFICANT CHANGE UP
VANCOMYCIN TROUGH SERPL-MCNC: 15.7 UG/ML — SIGNIFICANT CHANGE UP (ref 10–20)
VARIANT LYMPHS # BLD: 1.8 % — SIGNIFICANT CHANGE UP
WBC # BLD: 17.19 K/UL — HIGH (ref 3.8–10.5)
WBC # FLD AUTO: 17.19 K/UL — HIGH (ref 3.8–10.5)

## 2019-02-12 PROCEDURE — 49406 IMAGE CATH FLUID PERI/RETRO: CPT | Mod: 59

## 2019-02-12 PROCEDURE — 99233 SBSQ HOSP IP/OBS HIGH 50: CPT

## 2019-02-12 PROCEDURE — 71045 X-RAY EXAM CHEST 1 VIEW: CPT | Mod: 26

## 2019-02-12 PROCEDURE — 74177 CT ABD & PELVIS W/CONTRAST: CPT | Mod: 26

## 2019-02-12 RX ADMIN — MEROPENEM 100 MILLIGRAM(S): 1 INJECTION INTRAVENOUS at 05:30

## 2019-02-12 RX ADMIN — Medication 2: at 12:38

## 2019-02-12 RX ADMIN — Medication 2: at 05:30

## 2019-02-12 RX ADMIN — MEROPENEM 100 MILLIGRAM(S): 1 INJECTION INTRAVENOUS at 14:13

## 2019-02-12 RX ADMIN — Medication 63.75 MILLIMOLE(S): at 08:44

## 2019-02-12 RX ADMIN — OCTREOTIDE ACETATE 100 MICROGRAM(S): 200 INJECTION, SOLUTION INTRAVENOUS; SUBCUTANEOUS at 21:34

## 2019-02-12 RX ADMIN — Medication 2: at 23:34

## 2019-02-12 RX ADMIN — OCTREOTIDE ACETATE 100 MICROGRAM(S): 200 INJECTION, SOLUTION INTRAVENOUS; SUBCUTANEOUS at 05:30

## 2019-02-12 RX ADMIN — MEROPENEM 100 MILLIGRAM(S): 1 INJECTION INTRAVENOUS at 21:33

## 2019-02-12 RX ADMIN — MICAFUNGIN SODIUM 107.5 MILLIGRAM(S): 100 INJECTION, POWDER, LYOPHILIZED, FOR SOLUTION INTRAVENOUS at 12:41

## 2019-02-12 RX ADMIN — Medication 250 MILLIGRAM(S): at 05:30

## 2019-02-12 RX ADMIN — Medication 1 DROP(S): at 19:51

## 2019-02-12 RX ADMIN — Medication 1 DROP(S): at 05:19

## 2019-02-12 RX ADMIN — OCTREOTIDE ACETATE 100 MICROGRAM(S): 200 INJECTION, SOLUTION INTRAVENOUS; SUBCUTANEOUS at 14:13

## 2019-02-12 RX ADMIN — Medication 250 MILLIGRAM(S): at 23:33

## 2019-02-12 NOTE — PROGRESS NOTE ADULT - SUBJECTIVE AND OBJECTIVE BOX
SICU Daily Progress Note  =====================================================  Interval/Overnight Events: ISIS overnight    54y Female PMHx HTN, T2DM, asthma, depression with recent diagnosis of invasive ampullary adenocarcinoma of the CBD admitted for cholangitis on 1/5, s/p ERCP 1/8, whipple 1/11, rapid response for GIB on floor 1/18 s/p MTP, RTOR s/p celiotomy, evacuation of hematoma, GDA bleed stopped and sutured, Abthera vac. Closure of abdomen with strattice mesh on 1/21, with inna drains and left lower JOURDAN drain in subcutaneous, s/p incisional bleed 1/23, bedside skin staples removed, JOURDAN drain removed, hemorrhage controlled, for continuation of ICU care for respiratory failure requiring intubation s/p extubation 1/29, underwent CT A/P 2/2 which showed large LUQ collection and multiple smaller intraabdominal collection s/p IR drainage 2/4    Allergies: No Known Allergies      MEDICATIONS:   --------------------------------------------------------------------------------------  Neurologic Medications  acetaminophen    Suspension .. 650 milliGRAM(s) Oral every 6 hours PRN Temp greater or equal to 38C (100.4F), Mild Pain (1 - 3)  oxyCODONE    Solution 5 milliGRAM(s) Oral every 4 hours PRN Moderate Pain (4 - 6)  oxyCODONE    Solution 10 milliGRAM(s) Oral every 4 hours PRN Severe Pain (7 - 10)    Respiratory Medications    Cardiovascular Medications    Gastrointestinal Medications  pantoprazole   Suspension 40 milliGRAM(s) Oral daily    Genitourinary Medications    Hematologic/Oncologic Medications  enoxaparin Injectable 40 milliGRAM(s) SubCutaneous daily    Antimicrobial/Immunologic Medications  meropenem  IVPB 1000 milliGRAM(s) IV Intermittent every 8 hours  micafungin IVPB      micafungin IVPB 150 milliGRAM(s) IV Intermittent every 24 hours  vancomycin  IVPB      vancomycin  IVPB 750 milliGRAM(s) IV Intermittent every 12 hours    Endocrine/Metabolic Medications  insulin lispro (HumaLOG) corrective regimen sliding scale   SubCutaneous every 6 hours  octreotide  Injectable 100 MICROGram(s) SubCutaneous every 8 hours    Topical/Other Medications  artificial  tears Solution 1 Drop(s) Both EYES every 12 hours  chlorhexidine 4% Liquid 1 Application(s) Topical <User Schedule>  lidocaine 1%/EPINEPHrine 1:100,000 Inj 30 milliLiter(s) Local Injection daily    --------------------------------------------------------------------------------------  ICU Vital Signs Last 24 Hrs  T(C): 36.8 (11 Feb 2019 20:00), Max: 38.1 (11 Feb 2019 08:00)  T(F): 98.3 (11 Feb 2019 20:00), Max: 100.6 (11 Feb 2019 08:00)  HR: 92 (11 Feb 2019 23:00) (90 - 107)  BP: 145/68 (11 Feb 2019 09:00) (132/58 - 145/68)  BP(mean): 101 (11 Feb 2019 09:00) (76 - 101)  ABP: 155/73 (11 Feb 2019 23:00) (145/80 - 165/80)  ABP(mean): 106 (11 Feb 2019 23:00) (101 - 118)  RR: 14 (11 Feb 2019 23:00) (12 - 29)  SpO2: 100% (11 Feb 2019 23:00) (96% - 100%)    --------------------------------------------------------------------------------------  I&O's Detail    10 Feb 2019 07:01  -  11 Feb 2019 07:00  --------------------------------------------------------  IN:    Enteral Tube Flush: 40 mL    Glucerna: 1080 mL    IV PiggyBack: 500 mL  Total IN: 1620 mL    OUT:    Bulb: 55 mL    Bulb: 40 mL    Drain: 15 mL    VAC (Vacuum Assisted Closure) System: 500 mL    Voided: 1300 mL  Total OUT: 1910 mL    Total NET: -290 mL      11 Feb 2019 07:01  -  12 Feb 2019 01:28  --------------------------------------------------------  IN:    Glucerna: 585 mL    IV PiggyBack: 300 mL  Total IN: 885 mL    OUT:    Bulb: 35 mL    Bulb: 15 mL    VAC (Vacuum Assisted Closure) System: 100 mL    Voided: 650 mL  Total OUT: 800 mL    Total NET: 85 mL        --------------------------------------------------------------------------------------    EXAM  NEUROLOGY  Exam: Awake, alert    RESPIRATORY  Exam: Lungs clear to auscultation, Normal expansion/effort.     CARDIOVASCULAR  Exam: S1, S2.  Regular rate and rhythm.       GI/NUTRITION  Exam: Abdomen: soft, nontender, nondistended, incision C/D/I with midline vac in place, JOURDAN x2 bilious, IR drain serosanguinous  Diet: TF via tiger tube, PO clears    HEMATOLOGIC  [x] VTE Prophylaxis: enoxaparin Injectable 40 milliGRAM(s) SubCutaneous daily    Transfusions:	[] PRBC	[] Platelets		[] FFP	[] Cryoprecipitate    INFECTIOUS DISEASE  Antimicrobials/Immunologic Medications:  meropenem  IVPB 1000 milliGRAM(s) IV Intermittent every 8 hours  micafungin IVPB      micafungin IVPB 150 milliGRAM(s) IV Intermittent every 24 hours  vancomycin  IVPB      vancomycin  IVPB 750 milliGRAM(s) IV Intermittent every 12 hours    Tubes/Lines/Drains    [x] Peripheral IV  [x] Arterial Line		[] R	[x] L	[] Fem	[] Rad	[x] Ax  [x] Necessity of urinary, arterial, and venous catheters discussed    LABS  --------------------------------------------------------------------------------------  CBC (02-11 @ 03:00)                              8.5<L>                         18.83<H>  )----------------(  367        --    % Neutrophils, --    % Lymphocytes, ANC: --                                  26.7<L>    BMP (02-11 @ 03:00)             131<L>  |  94<L>   |  12    		Ca++ --      Ca 7.7<L>             ---------------------------------( 148<H>		Mg 1.7                3.9     |  28      |  0.42<L>			Ph 2.2<L>      Coags (02-11 @ 03:00)  aPTT 32.2 / INR 1.36<H> / PT 15.7<H>      ABG (02-11 @ 03:00)     7.49<H> / 40 / 69<L> / 31<H> / 6.8 / 94.5<L>%     Lactate: 3.0<H>       --------------------------------------------------------------------------------------     ASSESSMENT:  54y Female PMHx HTN, T2DM, asthma, depression with recent diagnosis of invasive ampullary adenocarcinoma of the CBD admitted for cholangitis on 1/5, s/p ERCP 1/8, whipple 1/11, rapid response for GIB on floor 1/18 s/p MTP, RTOR s/p celiotomy, evacuation of hematoma, GDA bleed stopped and sutured, Abthera vac. Closure of abdomen with strattice mesh on 1/21, with inna drains and left lower JOURDAN drain in subcutaneous. Incisional bleed 1/23, bedside skin staples removed, JOURDAN drain removed, hemorrhage controlled, respiratory failure s/p extubation 1/29, underwent CT A/P 2/2 which showed large LUQ collection and multiple smaller intraabdominal collections s/p IR drainage 2/4 with placement of drain, now with abdominal VAC in place  ?  Neurology:  -Tylenol & Oxy and Dilaudid PRN for pain    Resp:  -supplemental O2 prn to maintain sat > 94%  -OOB, pulmonary toilet   ?  CV:  -goal MAP > 65  -continue close hemodynamic monitoring   ?  GI:  -Tigertube on TFs (Glucerna), clears around tiger tube  -Bowel regimen   c/w octreotide increased bilious output by drain   -protonix (home med)   -LFTs stable   -will get CTA&P soon to assess for collections   ?  Renal:  -monitor lytes, replete as needed  -IVF on hold, will resuscitate as needed  -hyponatremia, worsening   ?  Heme:  -monitor H/H, transfuse if <7   -lovenox for vte ppx  ?  ID:  -Febrile, uptrending leukocytosis on vanco (2/3), Diflucan (2/3) --> micafungin (2/5), and meropenem (2/2)  -procalcitonin 0.47   -U/A neg, blood cx neg 2/9 neg to date  -c-diff negative  -continue to trend WBC+  -consider repeat CT c/a/p    Endo  -ISS A1C 8.0   -FS q6   ?  Dispo: SICU    Critical Care Diagnosis: Septic shock, respiratory abnormality, malnutrition, peritonitis, abdominal abscess SICU Daily Progress Note  =====================================================  Interval/Overnight Events: ISIS overnight    54y Female PMHx HTN, T2DM, asthma, depression with recent diagnosis of invasive ampullary adenocarcinoma of the CBD admitted for cholangitis on 1/5, s/p ERCP 1/8, whipple 1/11, rapid response for GIB on floor 1/18 s/p MTP, RTOR s/p celiotomy, evacuation of hematoma, GDA bleed stopped and sutured, Abthera vac. Closure of abdomen with strattice mesh on 1/21, with inna drains and left lower JOURDAN drain in subcutaneous, s/p incisional bleed 1/23, bedside skin staples removed, JOURDAN drain removed, hemorrhage controlled, for continuation of ICU care for respiratory failure requiring intubation s/p extubation 1/29, underwent CT A/P 2/2 which showed large LUQ collection and multiple smaller intraabdominal collection s/p IR drainage 2/4    Allergies: No Known Allergies      MEDICATIONS:   --------------------------------------------------------------------------------------  Neurologic Medications  acetaminophen    Suspension .. 650 milliGRAM(s) Oral every 6 hours PRN Temp greater or equal to 38C (100.4F), Mild Pain (1 - 3)  oxyCODONE    Solution 5 milliGRAM(s) Oral every 4 hours PRN Moderate Pain (4 - 6)  oxyCODONE    Solution 10 milliGRAM(s) Oral every 4 hours PRN Severe Pain (7 - 10)    Respiratory Medications    Cardiovascular Medications    Gastrointestinal Medications  pantoprazole   Suspension 40 milliGRAM(s) Oral daily    Genitourinary Medications    Hematologic/Oncologic Medications  enoxaparin Injectable 40 milliGRAM(s) SubCutaneous daily    Antimicrobial/Immunologic Medications  meropenem  IVPB 1000 milliGRAM(s) IV Intermittent every 8 hours  micafungin IVPB      micafungin IVPB 150 milliGRAM(s) IV Intermittent every 24 hours  vancomycin  IVPB      vancomycin  IVPB 750 milliGRAM(s) IV Intermittent every 12 hours    Endocrine/Metabolic Medications  insulin lispro (HumaLOG) corrective regimen sliding scale   SubCutaneous every 6 hours  octreotide  Injectable 100 MICROGram(s) SubCutaneous every 8 hours    Topical/Other Medications  artificial  tears Solution 1 Drop(s) Both EYES every 12 hours  chlorhexidine 4% Liquid 1 Application(s) Topical <User Schedule>  lidocaine 1%/EPINEPHrine 1:100,000 Inj 30 milliLiter(s) Local Injection daily    --------------------------------------------------------------------------------------  ICU Vital Signs Last 24 Hrs  T(C): 36.8 (11 Feb 2019 20:00), Max: 38.1 (11 Feb 2019 08:00)  T(F): 98.3 (11 Feb 2019 20:00), Max: 100.6 (11 Feb 2019 08:00)  HR: 92 (11 Feb 2019 23:00) (90 - 107)  BP: 145/68 (11 Feb 2019 09:00) (132/58 - 145/68)  BP(mean): 101 (11 Feb 2019 09:00) (76 - 101)  ABP: 155/73 (11 Feb 2019 23:00) (145/80 - 165/80)  ABP(mean): 106 (11 Feb 2019 23:00) (101 - 118)  RR: 14 (11 Feb 2019 23:00) (12 - 29)  SpO2: 100% (11 Feb 2019 23:00) (96% - 100%)    --------------------------------------------------------------------------------------  I&O's Detail    10 Feb 2019 07:01  -  11 Feb 2019 07:00  --------------------------------------------------------  IN:    Enteral Tube Flush: 40 mL    Glucerna: 1080 mL    IV PiggyBack: 500 mL  Total IN: 1620 mL    OUT:    Bulb: 55 mL    Bulb: 40 mL    Drain: 15 mL    VAC (Vacuum Assisted Closure) System: 500 mL    Voided: 1300 mL  Total OUT: 1910 mL    Total NET: -290 mL      11 Feb 2019 07:01  -  12 Feb 2019 01:28  --------------------------------------------------------  IN:    Glucerna: 585 mL    IV PiggyBack: 300 mL  Total IN: 885 mL    OUT:    Bulb: 35 mL    Bulb: 15 mL    VAC (Vacuum Assisted Closure) System: 100 mL    Voided: 650 mL  Total OUT: 800 mL    Total NET: 85 mL        --------------------------------------------------------------------------------------    EXAM  NEUROLOGY  Exam: Awake, alert    RESPIRATORY  Exam: Lungs clear to auscultation, Normal expansion/effort.     CARDIOVASCULAR  Exam: S1, S2.  Regular rate and rhythm.       GI/NUTRITION  Exam: Abdomen: soft, nontender, nondistended, incision C/D/I with midline vac in place, JOURDAN x2 bilious, IR drain serosanguinous  Diet: TF via tiger tube, PO clears    HEMATOLOGIC  [x] VTE Prophylaxis: enoxaparin Injectable 40 milliGRAM(s) SubCutaneous daily    Transfusions:	[] PRBC	[] Platelets		[] FFP	[] Cryoprecipitate    INFECTIOUS DISEASE  Antimicrobials/Immunologic Medications:  meropenem  IVPB 1000 milliGRAM(s) IV Intermittent every 8 hours  micafungin IVPB      micafungin IVPB 150 milliGRAM(s) IV Intermittent every 24 hours  vancomycin  IVPB      vancomycin  IVPB 750 milliGRAM(s) IV Intermittent every 12 hours    Tubes/Lines/Drains    [x] Peripheral IV  [x] Arterial Line		[] R	[x] L	[] Fem	[] Rad	[x] Ax  [x] Necessity of urinary, arterial, and venous catheters discussed    LABS  --------------------------------------------------------------------------------------  CBC (02-11 @ 03:00)                              8.5<L>                         18.83<H>  )----------------(  367        --    % Neutrophils, --    % Lymphocytes, ANC: --                                  26.7<L>    BMP (02-11 @ 03:00)             131<L>  |  94<L>   |  12    		Ca++ --      Ca 7.7<L>             ---------------------------------( 148<H>		Mg 1.7                3.9     |  28      |  0.42<L>			Ph 2.2<L>      Coags (02-11 @ 03:00)  aPTT 32.2 / INR 1.36<H> / PT 15.7<H>      ABG (02-11 @ 03:00)     7.49<H> / 40 / 69<L> / 31<H> / 6.8 / 94.5<L>%     Lactate: 3.0<H>       --------------------------------------------------------------------------------------

## 2019-02-12 NOTE — CHART NOTE - NSCHARTNOTEFT_GEN_A_CORE
Post Procedure Note      Subjective: The right abdominal fluid collection -  old hematoma --  upsized to a 14F catheter, only about 100cc of old blood aspirated. The left abdominal collection (abscess) -  50cc purulent fluid removed.       Objective:    T(C): 37.1 (02-12-19 @ 20:00), Max: 38.2 (02-12-19 @ 12:00)  HR: 80 (02-12-19 @ 23:00) (80 - 104)  BP: --  RR: 15 (02-12-19 @ 23:00) (13 - 35)  SpO2: 96% (02-12-19 @ 23:00) (93% - 100%)      02-11-19 @ 07:01  -  02-12-19 @ 07:00  --------------------------------------------------------  IN: 1290 mL / OUT: 1125 mL / NET: 165 mL    02-12-19 @ 07:01  -  02-13-19 @ 00:38  --------------------------------------------------------  IN: 225 mL / OUT: 380 mL / NET: -155 mL        Physical Exam:  General: resting comfortably  Abdomen: drains without surround erythema, drainage, swelling     Medications:                           7.9    17.19 )-----------( 348      ( 12 Feb 2019 02:50 )             24.9     02-12    132<L>  |  92<L>  |  11  ----------------------------<  160<H>  4.2   |  27  |  0.40<L>    Ca    7.6<L>      12 Feb 2019 02:50  Phos  2.3     02-12  Mg     2.0     02-12    TPro  5.7<L>  /  Alb  1.6<L>  /  TBili  2.0<H>  /  DBili  1.4<H>  /  AST  59<H>  /  ALT  25  /  AlkPhos  785<H>  02-12      Culture - Surg Site Aerob/Anaer w/Gm St (collected 02-12-19 @ 18:48)  Source: ABSCESS    Culture - Surg Site Aerob/Anaer w/Gm St (collected 02-12-19 @ 18:38)  Source: ABSCESS        Labs/Studies:                          7.9    17.19 )-----------( 348      ( 12 Feb 2019 02:50 )             24.9     02-12    132<L>  |  92<L>  |  11  ----------------------------<  160<H>  4.2   |  27  |  0.40<L>    Ca    7.6<L>      12 Feb 2019 02:50  Phos  2.3     02-12  Mg     2.0     02-12    TPro  5.7<L>  /  Alb  1.6<L>  /  TBili  2.0<H>  /  DBili  1.4<H>  /  AST  59<H>  /  ALT  25  /  AlkPhos  785<H>  02-12      Assessment/Plan:    54y Female PMHx HTN, T2DM, asthma, depression with recent diagnosis of invasive ampullary adenocarcinoma of the CBD admitted for cholangitis on 1/5, s/p ERCP 1/8, whipple 1/11, rapid response for GIB on floor 1/18 s/p MTP, RTOR s/p celiotomy, evacuation of hematoma, GDA bleed stopped and sutured, Abthera vac. Closure of abdomen with strattice mesh on 1/21, with inna drains and left lower JOURDAN drain in subcutaneous. Incisional bleed 1/23, bedside skin staples removed, JOURDAN drain removed, hemorrhage controlled, S/p interventional radiology drainage of LUQ collection 2/4; now s/p drainage of R hematoma and left abscess (2/12)    -cont to appreciate SICU care  -pain control as needed  -cont lovenox for DVT ppx  -PT/oob  -cont NPO/IVF/tube feeds via tiger tube; poss PO feeds around tiger tube  -monitor VS  -f/u cultures  -f/u fevers  -Flush both catheters 10cc NS daily. Suction drain  -cont current abx   -c/w octreotide

## 2019-02-12 NOTE — PROGRESS NOTE ADULT - ATTENDING COMMENTS
54y Female PMHx HTN, T2DM, asthma, depression with recent diagnosis of invasive ampullary adenocarcinoma of the CBD admitted for cholangitis on 1/5, s/p ERCP 1/8, whipple 1/11, rapid response for GIB on floor 1/18 s/p MTP, RTOR s/p celiotomy, evacuation of hematoma, GDA bleed stopped and sutured, Abthera vac. Closure of abdomen with strattice mesh on 1/21, with inna drains and left lower JOURDAN drain in subcutaneous. Incisional bleed 1/23, bedside skin staples removed, JOURDAN drain removed, hemorrhage controlled, respiratory failure s/p extubation 1/29, underwent CT A/P 2/2 which showed large LUQ collection and multiple smaller intraabdominal collections s/p IR drainage 2/4 with placement of drain, now with abdominal VAC in place  ?  Neurology:  -Tylenol & Oxy and Dilaudid PRN for pain    Resp:  -supplemental O2 prn to maintain sat > 94%  -OOB, pulmonary toilet   ?  CV:  -goal MAP > 65  -continue close hemodynamic monitoring   ?  GI:  -Tigertube on TFs (Glucerna), clears around tiger tube  -Bowel regimen   c/w octreotide increased bilious output by drain   -protonix (home med)   -LFTs stable   -CTA&P with fluid collection, placed call to IR for drainage     Renal:  -monitor lytes, replete as needed  -IVF on hold, will resuscitate as needed  -hyponatremia, worsening   ?  Heme:  -monitor H/H, transfuse if <7   -lovenox for vte ppx  ?  ID:  -Febrile, uptrending leukocytosis on vanco (2/3), Diflucan (2/3) --> micafungin (2/5), and meropenem (2/2)  -procalcitonin 0.47   -U/A neg, blood cx neg 2/9 neg to date  -c-diff negative  -continue to trend WBC+  -consider repeat CT c/a/p    Endo  -ISS A1C 8.0   -FS q6   ?  Dispo: SICU    Critical Care Diagnosis: Septic shock, respiratory abnormality, malnutrition, peritonitis, abdominal abscess  The patient is a critical care patient with life threatening hemodynamic and metabolic instability in SICU.  I have personally interviewed when possible and examined the patient, reviewed data and laboratory tests/x-rays and all pertinent electronic images.  I was physically present for the key portions of the evaluation and management (E/M) service provided.   The SICU team has a constant risk benefit analyzes discussion with the primary team, all consultants, House Staff and PA's on all decisions.  These diagnoses are unrelated to the surgical procedure noted above.  I meet with family if needed to get further history, discuss the case and make care decisions for this patient who might not be able to participate.  Time involved in performance of separately billable procedures was not counted toward my critical care time. There is no overlap.  I spent 55-75 minutes of critical care time for the diagnoses, assessment, plan and interventions.

## 2019-02-12 NOTE — PROGRESS NOTE ADULT - ASSESSMENT
54y Female PMHx HTN, T2DM, asthma, depression with recent diagnosis of invasive ampullary adenocarcinoma of the CBD admitted for cholangitis on 1/5, s/p ERCP 1/8, whipple 1/11, rapid response for GIB on floor 1/18 s/p MTP, RTOR s/p celiotomy, evacuation of hematoma, GDA bleed stopped and sutured, Abthera vac. Closure of abdomen with strattice mesh on 1/21, with inna drains and left lower JOURDAN drain in subcutaneous. Incisional bleed 1/23, bedside skin staples removed, JOURDAN drain removed, hemorrhage controlled, for continuation of ICU care. S/p interventional radiology drainage of LUQ collection 2/4    PLAN:  -cont to appreciate SICU care  -pain control as needed  -cont lovenox for DVT ppx  -PT/oob  -cont NPO/IVF/tube feeds via tiger tube; poss PO feeds around tiger tube  -monitor VS  -f/u cultures  -f/u fevers  -D/C JOURDAN drain  -cont current abx   -c/w octreotide      D Team Surgery  [id number]

## 2019-02-12 NOTE — PROGRESS NOTE ADULT - ASSESSMENT
ASSESSMENT:  54y Female PMHx HTN, T2DM, asthma, depression with recent diagnosis of invasive ampullary adenocarcinoma of the CBD admitted for cholangitis on 1/5, s/p ERCP 1/8, whipple 1/11, rapid response for GIB on floor 1/18 s/p MTP, RTOR s/p celiotomy, evacuation of hematoma, GDA bleed stopped and sutured, Abthera vac. Closure of abdomen with strattice mesh on 1/21, with inna drains and left lower JOURDAN drain in subcutaneous. Incisional bleed 1/23, bedside skin staples removed, JOURDAN drain removed, hemorrhage controlled, respiratory failure s/p extubation 1/29, underwent CT A/P 2/2 which showed large LUQ collection and multiple smaller intraabdominal collections s/p IR drainage 2/4 with placement of drain, now with abdominal VAC in place  ?  Neurology:  -Tylenol & Oxy and Dilaudid PRN for pain    Resp:  -supplemental O2 prn to maintain sat > 94%  -OOB, pulmonary toilet   ?  CV:  -goal MAP > 65  -continue close hemodynamic monitoring   ?  GI:  -Tigertube on TFs (Glucerna), clears around tiger tube  -Bowel regimen   c/w octreotide increased bilious output by drain   -protonix (home med)   -LFTs stable   -CTA&P with fluid collection, placed call to IR for drainage     Renal:  -monitor lytes, replete as needed  -IVF on hold, will resuscitate as needed  -hyponatremia, worsening   ?  Heme:  -monitor H/H, transfuse if <7   -lovenox for vte ppx  ?  ID:  -Febrile, uptrending leukocytosis on vanco (2/3), Diflucan (2/3) --> micafungin (2/5), and meropenem (2/2)  -procalcitonin 0.47   -U/A neg, blood cx neg 2/9 neg to date  -c-diff negative  -continue to trend WBC+  -consider repeat CT c/a/p    Endo  -ISS A1C 8.0   -FS q6   ?  Dispo: SICU    Critical Care Diagnosis: Septic shock, respiratory abnormality, malnutrition, peritonitis, abdominal abscess

## 2019-02-12 NOTE — CHART NOTE - NSCHARTNOTEFT_GEN_A_CORE
Pre-Interventional Radiology Procedure Note    54y    Female    Procedure: Drainage of intra-abdominal abscess     Diagnosis/Indication: Patient is a 54y old  Female who presents with a chief complaint of cholangitis (12 Feb 2019 11:10)    Interventional Radiology Attending Physician: Dr. King     Ordering Attending Physician: Dr. Bourne     PAST MEDICAL & SURGICAL HISTORY:  Adenocarcinoma of gallbladder  Type 2 diabetes mellitus without complication, without long-term current use of insulin  Neuropathy  Arthritis  Mild intermittent asthma without complication  Gastroesophageal reflux disease without esophagitis  Essential hypertension  No significant past surgical history     CBC Full  -  ( 12 Feb 2019 02:50 )  WBC Count : 17.19 K/uL  Hemoglobin : 7.9 g/dL  Hematocrit : 24.9 %  Platelet Count - Automated : 348 K/uL  Mean Cell Volume : 88.0 fL  Mean Cell Hemoglobin : 27.9 pg  Mean Cell Hemoglobin Concentration : 31.7 %  Auto Neutrophil # : 11.87 K/uL  Auto Lymphocyte # : 2.26 K/uL  Auto Monocyte # : 2.55 K/uL  Auto Eosinophil # : 0.23 K/uL  Auto Basophil # : 0.08 K/uL  Auto Neutrophil % : 69.1 %  Auto Lymphocyte % : 13.1 %  Auto Monocyte % : 14.8 %  Auto Eosinophil % : 1.3 %  Auto Basophil % : 0.5 %    02-12    132<L>  |  92<L>  |  11  ----------------------------<  160<H>  4.2   |  27  |  0.40<L>    Ca    7.6<L>      12 Feb 2019 02:50  Phos  2.3     02-12  Mg     2.0     02-12    TPro  5.7<L>  /  Alb  1.6<L>  /  TBili  2.0<H>  /  DBili  1.4<H>  /  AST  59<H>  /  ALT  25  /  AlkPhos  785<H>  02-12    PT/INR - ( 12 Feb 2019 10:15 )   PT: 15.9 SEC;   INR: 1.38          PTT - ( 12 Feb 2019 10:15 )  PTT:31.8 SEC

## 2019-02-12 NOTE — PROGRESS NOTE ADULT - SUBJECTIVE AND OBJECTIVE BOX
Vascular & Interventional Radiology Post-Procedure Note    Pre-Procedure Diagnosis: Abdominal fluid collections  Post-Procedure Diagnosis: Same as pre.  Indications for Procedure: sepsis    : Trino  Assistant(s): Chavez    Procedure Details/Findings:   -The right abdominal fluid collection is old hematoma/bloo0d products, very thick. We upsized to a 14F catheter, only about 100cc of old blood aspirated. The remaining collection on CT is too thick to drain.  -The left abdominal collection is an abscess. 50cc purulent fluid removed.   Access (if applicable): n/a    Complications: none  Estimated Blood Loss: Minimal  Specimen: both sent for culture  Contrast: none  Sedation: Anesthesia  Patient Condition/Disposition: Stable, back to ICU    Plan: Flush both catheters 10cc NS daily. Suction drain. Surgical management.

## 2019-02-12 NOTE — PROGRESS NOTE ADULT - SUBJECTIVE AND OBJECTIVE BOX
SUBJECTIVE: Pt seen and examined at bedside. No acute events overnight.     Vital Signs Last 24 Hrs  T(C): 37.3 (12 Feb 2019 08:30), Max: 38.3 (11 Feb 2019 23:40)  T(F): 99.1 (12 Feb 2019 08:30), Max: 101 (11 Feb 2019 23:40)  HR: 91 (12 Feb 2019 09:00) (83 - 104)  RR: 17 (12 Feb 2019 09:00) (12 - 35)  SpO2: 100% (12 Feb 2019 09:00) (96% - 100%)      General Appearance: Appears well, NAD  Neck: Supple  Chest: Equal expansion bilaterally, equal breath sounds  CV: Pulse regular presently  Abdomen: Soft, nontense, appropriate incisional tenderness, dressings clean and dry and intact  Extremities: Grossly symmetric, SCD's in place     I&O's Summary    11 Feb 2019 07:01  -  12 Feb 2019 07:00  --------------------------------------------------------  IN: 1290 mL / OUT: 1125 mL / NET: 165 mL    12 Feb 2019 07:01  -  12 Feb 2019 11:10  --------------------------------------------------------  IN: 90 mL / OUT: 0 mL / NET: 90 mL      I&O's Detail    11 Feb 2019 07:01  -  12 Feb 2019 07:00  --------------------------------------------------------  IN:    Glucerna: 990 mL    IV PiggyBack: 300 mL  Total IN: 1290 mL    OUT:    Bulb: 25 mL    Bulb: 50 mL    VAC (Vacuum Assisted Closure) System: 150 mL    Voided: 900 mL  Total OUT: 1125 mL    Total NET: 165 mL      12 Feb 2019 07:01  -  12 Feb 2019 11:10  --------------------------------------------------------  IN:    Glucerna: 90 mL  Total IN: 90 mL    OUT:  Total OUT: 0 mL    Total NET: 90 mL          MEDICATIONS  (STANDING):  artificial  tears Solution 1 Drop(s) Both EYES every 12 hours  chlorhexidine 4% Liquid 1 Application(s) Topical <User Schedule>  enoxaparin Injectable 40 milliGRAM(s) SubCutaneous daily  insulin lispro (HumaLOG) corrective regimen sliding scale   SubCutaneous every 6 hours  lidocaine 1%/EPINEPHrine 1:100,000 Inj 30 milliLiter(s) Local Injection daily  meropenem  IVPB 1000 milliGRAM(s) IV Intermittent every 8 hours  micafungin IVPB      micafungin IVPB 150 milliGRAM(s) IV Intermittent every 24 hours  octreotide  Injectable 100 MICROGram(s) SubCutaneous every 8 hours  pantoprazole   Suspension 40 milliGRAM(s) Oral daily  vancomycin  IVPB      vancomycin  IVPB 750 milliGRAM(s) IV Intermittent every 12 hours    MEDICATIONS  (PRN):  acetaminophen    Suspension .. 650 milliGRAM(s) Oral every 6 hours PRN Temp greater or equal to 38C (100.4F), Mild Pain (1 - 3)  oxyCODONE    Solution 5 milliGRAM(s) Oral every 4 hours PRN Moderate Pain (4 - 6)  oxyCODONE    Solution 10 milliGRAM(s) Oral every 4 hours PRN Severe Pain (7 - 10)      LABS:                        7.9    17.19 )-----------( 348      ( 12 Feb 2019 02:50 )             24.9     02-12    132<L>  |  92<L>  |  11  ----------------------------<  160<H>  4.2   |  27  |  0.40<L>    Ca    7.6<L>      12 Feb 2019 02:50  Phos  2.3     02-12  Mg     2.0     02-12    TPro  5.7<L>  /  Alb  1.6<L>  /  TBili  2.0<H>  /  DBili  1.4<H>  /  AST  59<H>  /  ALT  25  /  AlkPhos  785<H>  02-12    PT/INR - ( 12 Feb 2019 10:15 )   PT: 15.9 SEC;   INR: 1.38          PTT - ( 12 Feb 2019 10:15 )  PTT:31.8 SEC      RADIOLOGY & ADDITIONAL STUDIES:

## 2019-02-12 NOTE — PROGRESS NOTE ADULT - SUBJECTIVE AND OBJECTIVE BOX
Interventional Radiology Pre-Procedure Note    54y Female PMHx HTN, T2DM, asthma, depression with recent diagnosis of invasive ampullary adenocarcinoma of the CBD admitted for cholangitis on 1/5, s/p ERCP 1/8, whipple 1/11, rapid response for GIB on floor 1/18 s/p MTP, RTOR s/p celiotomy, evacuation of hematoma, GDA bleed stopped and sutured, Abthera vac. Closure of abdomen with strattice mesh on 1/21, with inna drains and left lower JOURDAN drain in subcutaneous. S/p interventional radiology drainage of LUQ collection 2/4. Pt has two abdominal collections on recent CT.    Procedure: CT-guided abdominal collection drainage w/ possible catheter placement.      PAST MEDICAL & SURGICAL HISTORY:  Adenocarcinoma of gallbladder  Type 2 diabetes mellitus without complication, without long-term current use of insulin  Neuropathy  Arthritis  Mild intermittent asthma without complication  Gastroesophageal reflux disease without esophagitis  Essential hypertension  No significant past surgical history    Allergies: No Known Allergies    LABS:  CBC Full  -  ( 12 Feb 2019 02:50 )  WBC Count : 17.19 K/uL  Hemoglobin : 7.9 g/dL  Hematocrit : 24.9 %  Platelet Count - Automated : 348 K/uL  Mean Cell Volume : 88.0 fL  Mean Cell Hemoglobin : 27.9 pg  Mean Cell Hemoglobin Concentration : 31.7 %  Auto Neutrophil # : 11.87 K/uL  Auto Lymphocyte # : 2.26 K/uL  Auto Monocyte # : 2.55 K/uL  Auto Eosinophil # : 0.23 K/uL  Auto Basophil # : 0.08 K/uL  Auto Neutrophil % : 69.1 %  Auto Lymphocyte % : 13.1 %  Auto Monocyte % : 14.8 %  Auto Eosinophil % : 1.3 %  Auto Basophil % : 0.5 %    02-12    132<L>  |  92<L>  |  11  ----------------------------<  160<H>  4.2   |  27  |  0.40<L>    Ca    7.6<L>      12 Feb 2019 02:50  Phos  2.3     02-12  Mg     2.0     02-12    TPro  5.7<L>  /  Alb  1.6<L>  /  TBili  2.0<H>  /  DBili  1.4<H>  /  AST  59<H>  /  ALT  25  /  AlkPhos  785<H>  02-12    PT/INR - ( 12 Feb 2019 10:15 )   PT: 15.9 SEC;   INR: 1.38          PTT - ( 12 Feb 2019 10:15 )  PTT:31.8 SEC    Procedure/ risks/ benefits were explained, informed consent obtained from patient's daughter, verbalizes understanding.

## 2019-02-12 NOTE — CHART NOTE - NSCHARTNOTEFT_GEN_A_CORE
NUTRITION FOLLOW-UP:    Pt seen for extended critical care nutrition follow/up.  Pt continues on enteral nutrition support via [employer].  Clear Liquid diet started yesterday with Ensure Enlive supplement.  Spoke w/pt's daughter-pt had only sips of apple juice and broth and some jello.  Current wt is 6.3kg above admission wt - likely fluid related.  Pt w/2+generalized edema.  Pt experiencing loose BM.  Stage II pressure injury noted on L anterior abdomen.      Weight:  2/12 - 64.1kg     2/10 - 63.3kg     2/7 -= 63.5kg     Adm - 57.4kg  Labs:  H/H 7.9/24.9  Na 132  Glu 160  t.Ca 7.6  Phos 2.3  -188    Current Diet:  Clear Liquid Consistent Carbohydrate diet + Ensure Enlive 240ml 2x/d + Glucerna 1.2 @45ml/h x24h/d via [place] tube + 1 pack prosource/d  Nutrition Recommendations:  Enteral nutrition support providing 1296 kcal w/64gm protein +15 grams via prosource = 79gms/d.  Minimal intake noted from clear liquid diet. Spoke w/SICU Attending regarding Ensure Enlive and requested change to Glucerna Shake which provides 220 kcal w/10gm protein/serving.      RD to Remain Available:  yes    Additional Recommendations:   1) Monitor weights, labs, BM's, skin integrity, p.o. intake, tolerance to enteral nutrition support, FS  2) Change Ensure Enlive to Glucerna Shake 2x/d.    3) Continue to encourage and assist pt w/meals.  Consider small frequent feedings during the day.    4) Consider MVI supplement to aid in healing of pressure injury.

## 2019-02-13 LAB
AMYLASE FLD-CCNC: 3315 U/L — SIGNIFICANT CHANGE UP
ANION GAP SERPL CALC-SCNC: 13 MMO/L — SIGNIFICANT CHANGE UP (ref 7–14)
ANION GAP SERPL CALC-SCNC: 7 MMO/L — SIGNIFICANT CHANGE UP (ref 7–14)
APTT BLD: 32.4 SEC — SIGNIFICANT CHANGE UP (ref 27.5–36.3)
BASOPHILS # BLD AUTO: 0.06 K/UL — SIGNIFICANT CHANGE UP (ref 0–0.2)
BASOPHILS NFR BLD AUTO: 0.3 % — SIGNIFICANT CHANGE UP (ref 0–2)
BUN SERPL-MCNC: 12 MG/DL — SIGNIFICANT CHANGE UP (ref 7–23)
BUN SERPL-MCNC: 13 MG/DL — SIGNIFICANT CHANGE UP (ref 7–23)
CALCIUM SERPL-MCNC: 7.7 MG/DL — LOW (ref 8.4–10.5)
CALCIUM SERPL-MCNC: 7.7 MG/DL — LOW (ref 8.4–10.5)
CHLORIDE SERPL-SCNC: 86 MMOL/L — LOW (ref 98–107)
CHLORIDE SERPL-SCNC: 89 MMOL/L — LOW (ref 98–107)
CO2 SERPL-SCNC: 27 MMOL/L — SIGNIFICANT CHANGE UP (ref 22–31)
CO2 SERPL-SCNC: 27 MMOL/L — SIGNIFICANT CHANGE UP (ref 22–31)
CREAT SERPL-MCNC: 0.45 MG/DL — LOW (ref 0.5–1.3)
CREAT SERPL-MCNC: 0.46 MG/DL — LOW (ref 0.5–1.3)
CULTURE - ACID FAST SMEAR CONCENTRATED: SIGNIFICANT CHANGE UP
CULTURE - ACID FAST SMEAR CONCENTRATED: SIGNIFICANT CHANGE UP
EOSINOPHIL # BLD AUTO: 0 K/UL — SIGNIFICANT CHANGE UP (ref 0–0.5)
EOSINOPHIL NFR BLD AUTO: 0 % — SIGNIFICANT CHANGE UP (ref 0–6)
GLUCOSE SERPL-MCNC: 241 MG/DL — HIGH (ref 70–99)
GLUCOSE SERPL-MCNC: 261 MG/DL — HIGH (ref 70–99)
HCT VFR BLD CALC: 28.6 % — LOW (ref 34.5–45)
HGB BLD-MCNC: 9.1 G/DL — LOW (ref 11.5–15.5)
IMM GRANULOCYTES NFR BLD AUTO: 1.1 % — SIGNIFICANT CHANGE UP (ref 0–1.5)
INR BLD: 1.47 — HIGH (ref 0.88–1.17)
LYMPHOCYTES # BLD AUTO: 1.22 K/UL — SIGNIFICANT CHANGE UP (ref 1–3.3)
LYMPHOCYTES # BLD AUTO: 6.2 % — LOW (ref 13–44)
MAGNESIUM SERPL-MCNC: 2 MG/DL — SIGNIFICANT CHANGE UP (ref 1.6–2.6)
MAGNESIUM SERPL-MCNC: 2.1 MG/DL — SIGNIFICANT CHANGE UP (ref 1.6–2.6)
MCHC RBC-ENTMCNC: 27.7 PG — SIGNIFICANT CHANGE UP (ref 27–34)
MCHC RBC-ENTMCNC: 31.8 % — LOW (ref 32–36)
MCV RBC AUTO: 87.2 FL — SIGNIFICANT CHANGE UP (ref 80–100)
MONOCYTES # BLD AUTO: 0.43 K/UL — SIGNIFICANT CHANGE UP (ref 0–0.9)
MONOCYTES NFR BLD AUTO: 2.2 % — SIGNIFICANT CHANGE UP (ref 2–14)
NEUTROPHILS # BLD AUTO: 17.79 K/UL — HIGH (ref 1.8–7.4)
NEUTROPHILS NFR BLD AUTO: 90.2 % — HIGH (ref 43–77)
NRBC # FLD: 0 K/UL — LOW (ref 25–125)
PHOSPHATE SERPL-MCNC: 3.8 MG/DL — SIGNIFICANT CHANGE UP (ref 2.5–4.5)
PHOSPHATE SERPL-MCNC: 3.9 MG/DL — SIGNIFICANT CHANGE UP (ref 2.5–4.5)
PLATELET # BLD AUTO: 445 K/UL — HIGH (ref 150–400)
PMV BLD: 10.1 FL — SIGNIFICANT CHANGE UP (ref 7–13)
POTASSIUM SERPL-MCNC: 4.4 MMOL/L — SIGNIFICANT CHANGE UP (ref 3.5–5.3)
POTASSIUM SERPL-MCNC: 4.6 MMOL/L — SIGNIFICANT CHANGE UP (ref 3.5–5.3)
POTASSIUM SERPL-SCNC: 4.4 MMOL/L — SIGNIFICANT CHANGE UP (ref 3.5–5.3)
POTASSIUM SERPL-SCNC: 4.6 MMOL/L — SIGNIFICANT CHANGE UP (ref 3.5–5.3)
PROTHROM AB SERPL-ACNC: 16.5 SEC — HIGH (ref 9.8–13.1)
RBC # BLD: 3.28 M/UL — LOW (ref 3.8–5.2)
RBC # FLD: 16.9 % — HIGH (ref 10.3–14.5)
SODIUM SERPL-SCNC: 123 MMOL/L — LOW (ref 135–145)
SODIUM SERPL-SCNC: 126 MMOL/L — LOW (ref 135–145)
SPECIMEN SOURCE: SIGNIFICANT CHANGE UP
SPECIMEN SOURCE: SIGNIFICANT CHANGE UP
WBC # BLD: 19.72 K/UL — HIGH (ref 3.8–10.5)
WBC # FLD AUTO: 19.72 K/UL — HIGH (ref 3.8–10.5)

## 2019-02-13 PROCEDURE — 71045 X-RAY EXAM CHEST 1 VIEW: CPT | Mod: 26

## 2019-02-13 PROCEDURE — 99233 SBSQ HOSP IP/OBS HIGH 50: CPT

## 2019-02-13 RX ORDER — SODIUM CHLORIDE 9 MG/ML
1000 INJECTION INTRAMUSCULAR; INTRAVENOUS; SUBCUTANEOUS
Qty: 0 | Refills: 0 | Status: DISCONTINUED | OUTPATIENT
Start: 2019-02-13 | End: 2019-02-15

## 2019-02-13 RX ORDER — HYDROMORPHONE HYDROCHLORIDE 2 MG/ML
0.5 INJECTION INTRAMUSCULAR; INTRAVENOUS; SUBCUTANEOUS EVERY 4 HOURS
Qty: 0 | Refills: 0 | Status: DISCONTINUED | OUTPATIENT
Start: 2019-02-13 | End: 2019-02-14

## 2019-02-13 RX ORDER — ACETAMINOPHEN 500 MG
650 TABLET ORAL EVERY 6 HOURS
Qty: 0 | Refills: 0 | Status: DISCONTINUED | OUTPATIENT
Start: 2019-02-13 | End: 2019-02-22

## 2019-02-13 RX ORDER — HYDROMORPHONE HYDROCHLORIDE 2 MG/ML
1 INJECTION INTRAMUSCULAR; INTRAVENOUS; SUBCUTANEOUS ONCE
Qty: 0 | Refills: 0 | Status: DISCONTINUED | OUTPATIENT
Start: 2019-02-13 | End: 2019-02-13

## 2019-02-13 RX ADMIN — HYDROMORPHONE HYDROCHLORIDE 0.5 MILLIGRAM(S): 2 INJECTION INTRAMUSCULAR; INTRAVENOUS; SUBCUTANEOUS at 18:56

## 2019-02-13 RX ADMIN — MEROPENEM 100 MILLIGRAM(S): 1 INJECTION INTRAVENOUS at 05:42

## 2019-02-13 RX ADMIN — OXYCODONE HYDROCHLORIDE 10 MILLIGRAM(S): 5 TABLET ORAL at 20:29

## 2019-02-13 RX ADMIN — OXYCODONE HYDROCHLORIDE 10 MILLIGRAM(S): 5 TABLET ORAL at 09:21

## 2019-02-13 RX ADMIN — Medication 650 MILLIGRAM(S): at 19:00

## 2019-02-13 RX ADMIN — OXYCODONE HYDROCHLORIDE 10 MILLIGRAM(S): 5 TABLET ORAL at 21:20

## 2019-02-13 RX ADMIN — MEROPENEM 100 MILLIGRAM(S): 1 INJECTION INTRAVENOUS at 22:11

## 2019-02-13 RX ADMIN — MICAFUNGIN SODIUM 107.5 MILLIGRAM(S): 100 INJECTION, POWDER, LYOPHILIZED, FOR SOLUTION INTRAVENOUS at 12:50

## 2019-02-13 RX ADMIN — OXYCODONE HYDROCHLORIDE 10 MILLIGRAM(S): 5 TABLET ORAL at 14:00

## 2019-02-13 RX ADMIN — Medication 650 MILLIGRAM(S): at 20:17

## 2019-02-13 RX ADMIN — OXYCODONE HYDROCHLORIDE 10 MILLIGRAM(S): 5 TABLET ORAL at 01:17

## 2019-02-13 RX ADMIN — OXYCODONE HYDROCHLORIDE 10 MILLIGRAM(S): 5 TABLET ORAL at 13:55

## 2019-02-13 RX ADMIN — Medication 6: at 05:42

## 2019-02-13 RX ADMIN — OCTREOTIDE ACETATE 100 MICROGRAM(S): 200 INJECTION, SOLUTION INTRAVENOUS; SUBCUTANEOUS at 05:42

## 2019-02-13 RX ADMIN — HYDROMORPHONE HYDROCHLORIDE 0.5 MILLIGRAM(S): 2 INJECTION INTRAMUSCULAR; INTRAVENOUS; SUBCUTANEOUS at 19:00

## 2019-02-13 RX ADMIN — Medication 250 MILLIGRAM(S): at 14:06

## 2019-02-13 RX ADMIN — OCTREOTIDE ACETATE 100 MICROGRAM(S): 200 INJECTION, SOLUTION INTRAVENOUS; SUBCUTANEOUS at 22:11

## 2019-02-13 RX ADMIN — MEROPENEM 100 MILLIGRAM(S): 1 INJECTION INTRAVENOUS at 16:06

## 2019-02-13 RX ADMIN — OCTREOTIDE ACETATE 100 MICROGRAM(S): 200 INJECTION, SOLUTION INTRAVENOUS; SUBCUTANEOUS at 13:56

## 2019-02-13 RX ADMIN — Medication 4: at 18:56

## 2019-02-13 RX ADMIN — SODIUM CHLORIDE 80 MILLILITER(S): 9 INJECTION INTRAMUSCULAR; INTRAVENOUS; SUBCUTANEOUS at 19:01

## 2019-02-13 RX ADMIN — Medication 1 DROP(S): at 05:35

## 2019-02-13 RX ADMIN — Medication 1 DROP(S): at 18:56

## 2019-02-13 RX ADMIN — Medication 4: at 12:50

## 2019-02-13 RX ADMIN — ENOXAPARIN SODIUM 40 MILLIGRAM(S): 100 INJECTION SUBCUTANEOUS at 13:55

## 2019-02-13 RX ADMIN — OXYCODONE HYDROCHLORIDE 10 MILLIGRAM(S): 5 TABLET ORAL at 01:32

## 2019-02-13 RX ADMIN — PANTOPRAZOLE SODIUM 40 MILLIGRAM(S): 20 TABLET, DELAYED RELEASE ORAL at 19:00

## 2019-02-13 RX ADMIN — CHLORHEXIDINE GLUCONATE 1 APPLICATION(S): 213 SOLUTION TOPICAL at 09:18

## 2019-02-13 NOTE — PROGRESS NOTE ADULT - ASSESSMENT
54y Female PMHx HTN, T2DM, asthma, depression with recent diagnosis of invasive ampullary adenocarcinoma of the CBD admitted for cholangitis on 1/5, s/p ERCP 1/8, whipple 1/11, rapid response for GIB on floor 1/18 s/p MTP, RTOR s/p celiotomy, evacuation of hematoma, GDA bleed stopped and sutured, Abthera vac. Closure of abdomen with strattice mesh on 1/21, with inna drains and left lower JOURDAN drain in subcutaneous. Incisional bleed 1/23, bedside skin staples removed, JOURDAN drain removed, hemorrhage controlled, for continuation of ICU care. S/p interventional radiology drainage of LUQ collection 2/4    PLAN:    - C/w CLD and NJ TF  - pain control as needed  - cont current abx   - c/w octreotide  - PT/oob  - lovenox for DVT ppx      D Team Surgery  [id number]

## 2019-02-13 NOTE — CHART NOTE - NSCHARTNOTEFT_GEN_A_CORE
GENERAL SURGERY FLOOR TRANSFER NOTE    54y Female transferred to floor from SICU    SUBJECTIVE: Pt seen and examined at bedside. Patient is in NAD denies chest pain, SOB, N/V, fever and chills. Patient is complaining of mild RLQ abdominal pain and requesting to go for a walk.      OBJECTIVE:    T(C): 36.3 (02-13-19 @ 12:00), Max: 37.2 (02-13-19 @ 00:00)  HR: 75 (02-13-19 @ 12:00) (72 - 97)  BP: 117/58 (02-13-19 @ 12:00) (117/58 - 117/58)  RR: 18 (02-13-19 @ 12:00) (11 - 28)  SpO2: 96% (02-13-19 @ 12:00) (93% - 97%)  Wt(kg): --      I&O's Summary    12 Feb 2019 07:01  -  13 Feb 2019 07:00  --------------------------------------------------------  IN: 790 mL / OUT: 630 mL / NET: 160 mL    13 Feb 2019 07:01  -  13 Feb 2019 16:02  --------------------------------------------------------  IN: 210 mL / OUT: 55 mL / NET: 155 mL                              9.1    19.72 )-----------( 445      ( 13 Feb 2019 02:10 )             28.6       02-13    126<L>  |  86<L>  |  13  ----------------------------<  261<H>  4.6   |  27  |  0.46<L>    Ca    7.7<L>      13 Feb 2019 03:50  Phos  3.9     02-13  Mg     2.1     02-13    TPro  5.7<L>  /  Alb  1.6<L>  /  TBili  2.0<H>  /  DBili  1.4<H>  /  AST  59<H>  /  ALT  25  /  AlkPhos  785<H>  02-12      MEDICATIONS  (STANDING):  artificial  tears Solution 1 Drop(s) Both EYES every 12 hours  chlorhexidine 4% Liquid 1 Application(s) Topical <User Schedule>  enoxaparin Injectable 40 milliGRAM(s) SubCutaneous daily  insulin lispro (HumaLOG) corrective regimen sliding scale   SubCutaneous every 6 hours  meropenem  IVPB 1000 milliGRAM(s) IV Intermittent every 8 hours  micafungin IVPB      micafungin IVPB 150 milliGRAM(s) IV Intermittent every 24 hours  octreotide  Injectable 100 MICROGram(s) SubCutaneous every 8 hours  pantoprazole   Suspension 40 milliGRAM(s) Oral daily  vancomycin  IVPB      vancomycin  IVPB 750 milliGRAM(s) IV Intermittent every 12 hours    MEDICATIONS  (PRN):  acetaminophen    Suspension .. 650 milliGRAM(s) Oral every 6 hours PRN Temp greater or equal to 38C (100.4F), Mild Pain (1 - 3)  oxyCODONE    Solution 5 milliGRAM(s) Oral every 4 hours PRN Moderate Pain (4 - 6)  oxyCODONE    Solution 10 milliGRAM(s) Oral every 4 hours PRN Severe Pain (7 - 10)        PHYSICAL EXAM:  General Appearance: Appears well, NAD  Neck: Supple  Chest: Equal expansion bilaterally, equal breath sounds  CV: Pulse regular presently  Abdomen soft, nontender, nondistended, incision C/D/I with midline vac in place, R - IR w/ milky output; L -IR x2 w/ SS output  Extremities: Grossly symmetric, SCD's in place

## 2019-02-13 NOTE — PROGRESS NOTE ADULT - SUBJECTIVE AND OBJECTIVE BOX
SICU Morning Progress Note       Interval/Overnight Events: Patient went to IR yesterday and had 2 drains placed. Left drain was old hematoma and right drain put out purulent fluid.         HPI:  54y Female PMHx HTN, T2DM, asthma, depression with recent diagnosis of invasive ampullary adenocarcinoma of the CBD admitted for cholangitis on 1/5, s/p ERCP 1/8, whipple 1/11, rapid response for GIB on floor 1/18 s/p MTP, RTOR s/p celiotomy, evacuation of hematoma, GDA bleed stopped and sutured, Abthera vac. Closure of abdomen with strattice mesh on 1/21, with inna drains and left lower JOURDAN drain in subcutaneous, s/p incisional bleed 1/23, bedside skin staples removed, JOURDAN drain removed, hemorrhage controlled, for continuation of ICU care for respiratory failure requiring intubation s/p extubation 1/29, underwent CT A/P 2/2 which showed large LUQ collection and multiple smaller intraabdominal collection s/p IR drainage 2/4 and 2/12    Allergies: No Known Allergies    MEDICATIONS:   Neurologic Medications  acetaminophen    Suspension .. 650 milliGRAM(s) Oral every 6 hours PRN Temp greater or equal to 38C (100.4F), Mild Pain (1 - 3)  oxyCODONE    Solution 5 milliGRAM(s) Oral every 4 hours PRN Moderate Pain (4 - 6)  oxyCODONE    Solution 10 milliGRAM(s) Oral every 4 hours PRN Severe Pain (7 - 10)    Respiratory Medications    Cardiovascular Medications    Gastrointestinal Medications  pantoprazole   Suspension 40 milliGRAM(s) Oral daily    Genitourinary Medications    Hematologic/Oncologic Medications  enoxaparin Injectable 40 milliGRAM(s) SubCutaneous daily    Antimicrobial/Immunologic Medications  meropenem  IVPB 1000 milliGRAM(s) IV Intermittent every 8 hours  micafungin IVPB      micafungin IVPB 150 milliGRAM(s) IV Intermittent every 24 hours  vancomycin  IVPB      vancomycin  IVPB 750 milliGRAM(s) IV Intermittent every 12 hours    Endocrine/Metabolic Medications  insulin lispro (HumaLOG) corrective regimen sliding scale   SubCutaneous every 6 hours  octreotide  Injectable 100 MICROGram(s) SubCutaneous every 8 hours    Topical/Other Medications  artificial  tears Solution 1 Drop(s) Both EYES every 12 hours  chlorhexidine 4% Liquid 1 Application(s) Topical <User Schedule>    VITAL SIGNS, INS/OUTS (last 24 hours):  T(C): 37.1 (02-12-19 @ 20:00), Max: 38.2 (02-12-19 @ 12:00)  HR: 80 (02-12-19 @ 23:00) (80 - 104)  ABP: 126/60 (02-12-19 @ 23:00) (107/52 - 169/79)  ABP(mean): 86 (02-12-19 @ 23:00) (72 - 130)  RR: 15 (02-12-19 @ 23:00) (13 - 35)  SpO2: 96% (02-12-19 @ 23:00) (93% - 100%)  CAPILLARY BLOOD GLUCOSE  POCT Blood Glucose.: 154 mg/dL (12 Feb 2019 23:33)  POCT Blood Glucose.: 111 mg/dL (12 Feb 2019 19:21)  POCT Blood Glucose.: 157 mg/dL (12 Feb 2019 12:34)  POCT Blood Glucose.: 161 mg/dL (12 Feb 2019 05:27)   N/A    02-12 @ 07:01  -  02-13 @ 00:50  --------------------------------------------------------  IN:    Glucerna: 225 mL  Total IN: 225 mL    OUT:    Bulb: 30 mL    Voided: 350 mL  Total OUT: 380 mL  Total NET: -155 mL    EXAM  NEUROLOGY   Exam: Normal, NAD, alert, oriented, no focal deficits.  HEENT  Exam: Normocephalic, atraumatic, EOMI.    RESPIRATORY  Exam: Lungs clear to auscultation, Normal expansion/effort.   Mechanical Ventilation:   CARDIOVASCULAR  Exam: S1, S2.  Regular rate and rhythm.  No Peripheral edema   GI/NUTRITION  Exam: Abdomen soft, nontender, nondistended, incision C/D/I with midline vac in place, JOURDAN x4 serosanguinous  VASCULAR  Exam: Extremities warm, pink, well-perfused.   MUSCULOSKELETAL  Exam: All extremities moving spontaneously without limitations.   SKIN  Exam: Good skin turgor, no skin breakdown.   METABOLIC/FLUIDS/ELECTROLYTES   HEMATOLOGIC [x] VTE Prophylaxis: enoxaparin Injectable 40 milliGRAM(s) SubCutaneous daily    Transfusions:	[] PRBC	[] Platelets		[] FFP	[] Cryoprecipitate  INFECTIOUS DISEASE  Antimicrobials/Immunologic Medications:  meropenem  IVPB 1000 milliGRAM(s) IV Intermittent every 8 hours  micafungin IVPB      micafungin IVPB 150 milliGRAM(s) IV Intermittent every 24 hours  vancomycin  IVPB      vancomycin  IVPB 750 milliGRAM(s) IV Intermittent every 12 hours    Tubes/Lines/Drains    [x] Peripheral IV  [] Central Venous Line     	[] R	[] L	[] IJ	[] Fem	[] SC	Date Placed:   [] Arterial Line		[] R	[] L	[] Fem	[] Rad	[] Ax	Date Placed:   [] PICC		[] Midline		[] Mediport  [] Urinary Catheter		Date Placed:   [x] Necessity of urinary, arterial, and venous catheters discussed  LABS  --------------------------------------------------------------------------------------  ((Insert SICU Labs here))***  --------------------------------------------------------------------------------------  OTHER LABORATORY:   IMAGING STUDIES:   CXR: SICU Morning Progress Note       Interval/Overnight Events: Patient went to IR yesterday and had 2 drains placed. Left drain was old hematoma and right drain put out purulent fluid.         HPI:  54y Female PMHx HTN, T2DM, asthma, depression with recent diagnosis of invasive ampullary adenocarcinoma of the CBD admitted for cholangitis on 1/5, s/p ERCP 1/8, whipple 1/11, rapid response for GIB on floor 1/18 s/p MTP, RTOR s/p celiotomy, evacuation of hematoma, GDA bleed stopped and sutured, Abthera vac. Closure of abdomen with strattice mesh on 1/21, with inna drains and left lower JOURDAN drain in subcutaneous, s/p incisional bleed 1/23, bedside skin staples removed, JOURDAN drain removed, hemorrhage controlled, for continuation of ICU care for respiratory failure requiring intubation s/p extubation 1/29, underwent CT A/P 2/2 which showed large LUQ collection and multiple smaller intraabdominal collection s/p IR drainage 2/4 and 2/12    Allergies: No Known Allergies    MEDICATIONS:   Neurologic Medications  acetaminophen    Suspension .. 650 milliGRAM(s) Oral every 6 hours PRN Temp greater or equal to 38C (100.4F), Mild Pain (1 - 3)  oxyCODONE    Solution 5 milliGRAM(s) Oral every 4 hours PRN Moderate Pain (4 - 6)  oxyCODONE    Solution 10 milliGRAM(s) Oral every 4 hours PRN Severe Pain (7 - 10)    Respiratory Medications    Cardiovascular Medications    Gastrointestinal Medications  pantoprazole   Suspension 40 milliGRAM(s) Oral daily    Genitourinary Medications    Hematologic/Oncologic Medications  enoxaparin Injectable 40 milliGRAM(s) SubCutaneous daily    Antimicrobial/Immunologic Medications  meropenem  IVPB 1000 milliGRAM(s) IV Intermittent every 8 hours  micafungin IVPB      micafungin IVPB 150 milliGRAM(s) IV Intermittent every 24 hours  vancomycin  IVPB      vancomycin  IVPB 750 milliGRAM(s) IV Intermittent every 12 hours    Endocrine/Metabolic Medications  insulin lispro (HumaLOG) corrective regimen sliding scale   SubCutaneous every 6 hours  octreotide  Injectable 100 MICROGram(s) SubCutaneous every 8 hours    Topical/Other Medications  artificial  tears Solution 1 Drop(s) Both EYES every 12 hours  chlorhexidine 4% Liquid 1 Application(s) Topical <User Schedule>    VITAL SIGNS, INS/OUTS (last 24 hours):  T(C): 37.1 (02-12-19 @ 20:00), Max: 38.2 (02-12-19 @ 12:00)  HR: 80 (02-12-19 @ 23:00) (80 - 104)  ABP: 126/60 (02-12-19 @ 23:00) (107/52 - 169/79)  ABP(mean): 86 (02-12-19 @ 23:00) (72 - 130)  RR: 15 (02-12-19 @ 23:00) (13 - 35)  SpO2: 96% (02-12-19 @ 23:00) (93% - 100%)  CAPILLARY BLOOD GLUCOSE  POCT Blood Glucose.: 154 mg/dL (12 Feb 2019 23:33)  POCT Blood Glucose.: 111 mg/dL (12 Feb 2019 19:21)  POCT Blood Glucose.: 157 mg/dL (12 Feb 2019 12:34)  POCT Blood Glucose.: 161 mg/dL (12 Feb 2019 05:27)   N/A    02-12 @ 07:01  -  02-13 @ 00:50  --------------------------------------------------------  IN:    Glucerna: 225 mL  Total IN: 225 mL    OUT:    Bulb: 30 mL    Voided: 350 mL  Total OUT: 380 mL  Total NET: -155 mL    EXAM  NEUROLOGY   Exam: Normal, NAD, alert, oriented, no focal deficits.  HEENT  Exam: Normocephalic, atraumatic, EOMI.    RESPIRATORY  Exam: Lungs clear to auscultation, Normal expansion/effort.   Mechanical Ventilation:   CARDIOVASCULAR  Exam: S1, S2.  Regular rate and rhythm.  No Peripheral edema   GI/NUTRITION  Exam: Abdomen soft, nontender, nondistended, incision C/D/I with midline vac in place, JOURDAN x4 serosanguinous  VASCULAR  Exam: Extremities warm, pink, well-perfused.   MUSCULOSKELETAL  Exam: All extremities moving spontaneously without limitations.   SKIN  Exam: Good skin turgor, no skin breakdown.   METABOLIC/FLUIDS/ELECTROLYTES   HEMATOLOGIC [x] VTE Prophylaxis: enoxaparin Injectable 40 milliGRAM(s) SubCutaneous daily    Transfusions:	[] PRBC	[] Platelets		[] FFP	[] Cryoprecipitate  INFECTIOUS DISEASE  Antimicrobials/Immunologic Medications:  meropenem  IVPB 1000 milliGRAM(s) IV Intermittent every 8 hours  micafungin IVPB      micafungin IVPB 150 milliGRAM(s) IV Intermittent every 24 hours  vancomycin  IVPB      vancomycin  IVPB 750 milliGRAM(s) IV Intermittent every 12 hours    Tubes/Lines/Drains    [x] Peripheral IV  [] Central Venous Line     	[] R	[] L	[] IJ	[] Fem	[] SC	Date Placed:   [] Arterial Line		[] R	[] L	[] Fem	[] Rad	[] Ax	Date Placed:   [] PICC		[] Midline		[] Mediport  [] Urinary Catheter		Date Placed:   [x] Necessity of urinary, arterial, and venous catheters discussed  LABS                                            9.1                   Neurophils% (auto):   90.2   (02-13 @ 02:10):    19.72)-----------(445          Lymphocytes% (auto):  6.2                                           28.6                   Eosinphils% (auto):   0.0      Manual%: Neutrophils x    ; Lymphocytes x    ; Eosinophils x    ; Bands%: x    ; Blasts x          02-13    126<L>  |  86<L>  |  13  ----------------------------<  261<H>  4.6   |  27  |  0.46<L>    Ca    7.7<L>      13 Feb 2019 03:50  Phos  3.9     02-13  Mg     2.1     02-13    TPro  5.7<L>  /  Alb  1.6<L>  /  TBili  2.0<H>  /  DBili  1.4<H>  /  AST  59<H>  /  ALT  25  /  AlkPhos  785<H>  02-12    ( 02-13 @ 02:10 )   PT: 16.5 SEC;   INR: 1.47   aPTT: 32.4 SEC    RECENT CULTURES:  02-12 @ 18:48 ABSCESS     02-12 @ 18:38 ABSCESS     02-09 @ 17:21 URINE MIDSTREAM     02-09 @ 12:06 BLOOD     NO ORGANISMS ISOLATED  NO ORGANISMS ISOLATED AT 72 HRS.

## 2019-02-13 NOTE — PROGRESS NOTE ADULT - SUBJECTIVE AND OBJECTIVE BOX
SURGERY DAILY PROGRESS NOTE:       SUBJECTIVE/ROS: Patient examined at bedside. No acute events overnight. S/p IR placing new drains. POC doing well. OOB. Tolerates PO intake.          MEDICATIONS  (STANDING):  artificial  tears Solution 1 Drop(s) Both EYES every 12 hours  chlorhexidine 4% Liquid 1 Application(s) Topical <User Schedule>  enoxaparin Injectable 40 milliGRAM(s) SubCutaneous daily  insulin lispro (HumaLOG) corrective regimen sliding scale   SubCutaneous every 6 hours  meropenem  IVPB 1000 milliGRAM(s) IV Intermittent every 8 hours  micafungin IVPB      micafungin IVPB 150 milliGRAM(s) IV Intermittent every 24 hours  octreotide  Injectable 100 MICROGram(s) SubCutaneous every 8 hours  pantoprazole   Suspension 40 milliGRAM(s) Oral daily  vancomycin  IVPB      vancomycin  IVPB 750 milliGRAM(s) IV Intermittent every 12 hours    MEDICATIONS  (PRN):  acetaminophen    Suspension .. 650 milliGRAM(s) Oral every 6 hours PRN Temp greater or equal to 38C (100.4F), Mild Pain (1 - 3)  oxyCODONE    Solution 5 milliGRAM(s) Oral every 4 hours PRN Moderate Pain (4 - 6)  oxyCODONE    Solution 10 milliGRAM(s) Oral every 4 hours PRN Severe Pain (7 - 10)      OBJECTIVE:    Vital Signs Last 24 Hrs  T(C): 36.3 (2019 12:00), Max: 38.2 (2019 16:00)  T(F): 97.3 (2019 12:00), Max: 100.8 (2019 16:00)  HR: 75 (2019 12:00) (72 - 103)  BP: 117/58 (2019 12:00) (117/58 - 117/58)  BP(mean): --  RR: 18 (2019 12:00) (11 - 28)  SpO2: 96% (2019 12:00) (93% - 100%)        I&O's Detail    2019 07:01  -  2019 07:00  --------------------------------------------------------  IN:    Glucerna: 540 mL    IV PiggyBack: 250 mL  Total IN: 790 mL    OUT:    Bulb: 30 mL    Bulb: 40 mL    Bulb: 10 mL    VAC (Vacuum Assisted Closure) System: 50 mL    Voided: 500 mL  Total OUT: 630 mL    Total NET: 160 mL      2019 07:01  -  2019 14:03  --------------------------------------------------------  IN:    Enteral Tube Flush: 30 mL    Glucerna: 180 mL  Total IN: 210 mL    OUT:  Total OUT: 0 mL    Total NET: 210 mL          Daily     Daily Weight in k.2 (2019 05:00)    LABS:                        9.1    19.72 )-----------( 445      ( 2019 02:10 )             28.6     02-    126<L>  |  86<L>  |  13  ----------------------------<  261<H>  4.6   |  27  |  0.46<L>    Ca    7.7<L>      2019 03:50  Phos  3.9     -  Mg     2.1         TPro  5.7<L>  /  Alb  1.6<L>  /  TBili  2.0<H>  /  DBili  1.4<H>  /  AST  59<H>  /  ALT  25  /  AlkPhos  785<H>  02-12    PT/INR - ( 2019 02:10 )   PT: 16.5 SEC;   INR: 1.47          PTT - ( 2019 02:10 )  PTT:32.4 SEC    General Appearance: Appears well, NAD  Neck: Supple  Chest: Equal expansion bilaterally, equal breath sounds  CV: Pulse regular presently  Abdomen soft, nontender, nondistended, incision C/D/I with midline vac in place, R - IR w/ milky output; L -IR x2 w/ SS output  Extremities: Grossly symmetric, SCD's in place

## 2019-02-13 NOTE — PROGRESS NOTE ADULT - MINUTES
45
66
35
55
55
66
35
40
55
66
90

## 2019-02-13 NOTE — PROGRESS NOTE ADULT - ASSESSMENT
54y Female PMHx HTN, T2DM, asthma, depression with recent diagnosis of invasive ampullary adenocarcinoma of the CBD admitted for cholangitis on 1/5, s/p ERCP 1/8, whipple 1/11, rapid response for GIB on floor 1/18 s/p MTP, RTOR s/p celiotomy, evacuation of hematoma, GDA bleed stopped and sutured, Abthera vac. Closure of abdomen with strattice mesh on 1/21, with inna drains and left lower JOURDAN drain in subcutaneous. Incisional bleed 1/23, bedside skin staples removed, JOURDAN drain removed, hemorrhage controlled, respiratory failure s/p extubation 1/29, underwent CT A/P 2/2 which showed large LUQ collection and multiple smaller intraabdominal collections s/p IR drainage 2/4 and 2/12 with drain placement.  ?  Neurology:  -Tylenol & Oxy and Dilaudid PRN for pain    Resp:  -supplemental O2 prn to maintain sat > 94%  -OOB, pulmonary toilet   ?  CV:  -goal MAP > 65  -continue close hemodynamic monitoring   ?  GI:  -Tigertube on TFs (Glucerna), clears around tiger tube  -Bowel regimen   c/w octreotide increased bilious output by drain   -protonix (home med)   -LFTs stable     Renal:  -monitor lytes, replete as needed  -IVF on hold, will resuscitate as needed  -hyponatremia, worsening   ?  Heme:  -monitor H/H, transfuse if <7   -lovenox for vte ppx  ?  ID:  -Febrile, uptrending leukocytosis on vanco (2/3), Diflucan (2/3) --> micafungin (2/5), and meropenem (2/2)  -procalcitonin 0.47   -U/A neg, blood cx neg 2/9 neg to date  -c-diff negative  -continue to trend WBC+    Endo  -ISS A1C 8.0   -FS q6   ?  Dispo: SICU    Critical Care Diagnosis: Septic shock, respiratory abnormality, malnutrition, peritonitis, abdominal abscess 54y Female PMHx HTN, T2DM, asthma, depression with recent diagnosis of invasive ampullary adenocarcinoma of the CBD admitted for cholangitis on 1/5, s/p ERCP 1/8, whipple 1/11, rapid response for GIB on floor 1/18 s/p MTP, RTOR s/p celiotomy, evacuation of hematoma, GDA bleed stopped and sutured, Abthera vac. Closure of abdomen with strattice mesh on 1/21, with inna drains and left lower JOURDAN drain in subcutaneous. Incisional bleed 1/23, bedside skin staples removed, JOURDAN drain removed, hemorrhage controlled, respiratory failure s/p extubation 1/29, underwent CT A/P 2/2 which showed large LUQ collection and multiple smaller intraabdominal collections s/p IR drainage 2/4 and 2/12 with drain placement.  ?  Neurology:  -Tylenol & Oxy and Dilaudid PRN for pain    Resp:  -supplemental O2 prn to maintain sat > 94%  -OOB, pulmonary toilet   ?  CV:  -goal MAP > 65  -continue close hemodynamic monitoring   ?  GI:  -Tigertube on TFs (Glucerna), clears around tiger tube  -Bowel regimen   c/w octreotide increased bilious output by drain   -protonix (home med)   -LFTs stable     Renal:  -monitor lytes, replete as needed  -IVF on hold, will resuscitate as needed  -hyponatremia, worsening Na 126 today consider salt tabs  ?  Heme:  -monitor H/H, transfuse if <7   -lovenox for vte ppx  ?  ID:  -Febrile, uptrending leukocytosis on vanco (2/3), Diflucan (2/3) --> micafungin (2/5), and meropenem (2/2)  -procalcitonin 0.47   -U/A neg, blood cx neg 2/9 neg to date  -c-diff negative  -continue to trend WBC+    Endo  -ISS A1C 8.0   -FS q6   ?  Dispo: SICU    Critical Care Diagnosis: Septic shock, respiratory abnormality, malnutrition, peritonitis, abdominal abscess

## 2019-02-14 LAB
ANION GAP SERPL CALC-SCNC: 9 MMO/L — SIGNIFICANT CHANGE UP (ref 7–14)
BACTERIA BLD CULT: SIGNIFICANT CHANGE UP
BACTERIA BLD CULT: SIGNIFICANT CHANGE UP
BUN SERPL-MCNC: 16 MG/DL — SIGNIFICANT CHANGE UP (ref 7–23)
CALCIUM SERPL-MCNC: 8.2 MG/DL — LOW (ref 8.4–10.5)
CHLORIDE SERPL-SCNC: 92 MMOL/L — LOW (ref 98–107)
CO2 SERPL-SCNC: 29 MMOL/L — SIGNIFICANT CHANGE UP (ref 22–31)
CREAT SERPL-MCNC: 0.45 MG/DL — LOW (ref 0.5–1.3)
GLUCOSE SERPL-MCNC: 199 MG/DL — HIGH (ref 70–99)
HCT VFR BLD CALC: 27.4 % — LOW (ref 34.5–45)
HGB BLD-MCNC: 8.6 G/DL — LOW (ref 11.5–15.5)
MAGNESIUM SERPL-MCNC: 2.1 MG/DL — SIGNIFICANT CHANGE UP (ref 1.6–2.6)
MCHC RBC-ENTMCNC: 28 PG — SIGNIFICANT CHANGE UP (ref 27–34)
MCHC RBC-ENTMCNC: 31.4 % — LOW (ref 32–36)
MCV RBC AUTO: 89.3 FL — SIGNIFICANT CHANGE UP (ref 80–100)
NRBC # FLD: 0 K/UL — LOW (ref 25–125)
PHOSPHATE SERPL-MCNC: 2.7 MG/DL — SIGNIFICANT CHANGE UP (ref 2.5–4.5)
PLATELET # BLD AUTO: 459 K/UL — HIGH (ref 150–400)
PMV BLD: 10 FL — SIGNIFICANT CHANGE UP (ref 7–13)
POTASSIUM SERPL-MCNC: 4.2 MMOL/L — SIGNIFICANT CHANGE UP (ref 3.5–5.3)
POTASSIUM SERPL-SCNC: 4.2 MMOL/L — SIGNIFICANT CHANGE UP (ref 3.5–5.3)
RBC # BLD: 3.07 M/UL — LOW (ref 3.8–5.2)
RBC # FLD: 17.2 % — HIGH (ref 10.3–14.5)
SODIUM SERPL-SCNC: 130 MMOL/L — LOW (ref 135–145)
SPECIMEN SOURCE: SIGNIFICANT CHANGE UP
SPECIMEN SOURCE: SIGNIFICANT CHANGE UP
VANCOMYCIN TROUGH SERPL-MCNC: 19 UG/ML — SIGNIFICANT CHANGE UP (ref 10–20)
WBC # BLD: 18.61 K/UL — HIGH (ref 3.8–10.5)
WBC # FLD AUTO: 18.61 K/UL — HIGH (ref 3.8–10.5)

## 2019-02-14 RX ORDER — HYDROMORPHONE HYDROCHLORIDE 2 MG/ML
0.5 INJECTION INTRAMUSCULAR; INTRAVENOUS; SUBCUTANEOUS ONCE
Qty: 0 | Refills: 0 | Status: DISCONTINUED | OUTPATIENT
Start: 2019-02-14 | End: 2019-02-14

## 2019-02-14 RX ORDER — OXYCODONE HYDROCHLORIDE 5 MG/1
5 TABLET ORAL EVERY 4 HOURS
Qty: 0 | Refills: 0 | Status: DISCONTINUED | OUTPATIENT
Start: 2019-02-14 | End: 2019-02-15

## 2019-02-14 RX ADMIN — Medication 650 MILLIGRAM(S): at 12:38

## 2019-02-14 RX ADMIN — HYDROMORPHONE HYDROCHLORIDE 0.5 MILLIGRAM(S): 2 INJECTION INTRAMUSCULAR; INTRAVENOUS; SUBCUTANEOUS at 08:31

## 2019-02-14 RX ADMIN — Medication 650 MILLIGRAM(S): at 23:08

## 2019-02-14 RX ADMIN — Medication 2: at 00:30

## 2019-02-14 RX ADMIN — Medication 650 MILLIGRAM(S): at 19:07

## 2019-02-14 RX ADMIN — OCTREOTIDE ACETATE 100 MICROGRAM(S): 200 INJECTION, SOLUTION INTRAVENOUS; SUBCUTANEOUS at 14:31

## 2019-02-14 RX ADMIN — OCTREOTIDE ACETATE 100 MICROGRAM(S): 200 INJECTION, SOLUTION INTRAVENOUS; SUBCUTANEOUS at 06:08

## 2019-02-14 RX ADMIN — Medication 250 MILLIGRAM(S): at 15:15

## 2019-02-14 RX ADMIN — MEROPENEM 100 MILLIGRAM(S): 1 INJECTION INTRAVENOUS at 23:09

## 2019-02-14 RX ADMIN — Medication 2: at 12:39

## 2019-02-14 RX ADMIN — HYDROMORPHONE HYDROCHLORIDE 0.5 MILLIGRAM(S): 2 INJECTION INTRAMUSCULAR; INTRAVENOUS; SUBCUTANEOUS at 10:30

## 2019-02-14 RX ADMIN — MEROPENEM 100 MILLIGRAM(S): 1 INJECTION INTRAVENOUS at 06:08

## 2019-02-14 RX ADMIN — Medication 650 MILLIGRAM(S): at 13:30

## 2019-02-14 RX ADMIN — Medication 1 DROP(S): at 18:37

## 2019-02-14 RX ADMIN — OXYCODONE HYDROCHLORIDE 5 MILLIGRAM(S): 5 TABLET ORAL at 18:37

## 2019-02-14 RX ADMIN — MEROPENEM 100 MILLIGRAM(S): 1 INJECTION INTRAVENOUS at 14:30

## 2019-02-14 RX ADMIN — MICAFUNGIN SODIUM 107.5 MILLIGRAM(S): 100 INJECTION, POWDER, LYOPHILIZED, FOR SOLUTION INTRAVENOUS at 12:38

## 2019-02-14 RX ADMIN — OCTREOTIDE ACETATE 100 MICROGRAM(S): 200 INJECTION, SOLUTION INTRAVENOUS; SUBCUTANEOUS at 23:08

## 2019-02-14 RX ADMIN — Medication 250 MILLIGRAM(S): at 00:30

## 2019-02-14 RX ADMIN — Medication 650 MILLIGRAM(S): at 18:37

## 2019-02-14 RX ADMIN — Medication 2: at 18:37

## 2019-02-14 RX ADMIN — HYDROMORPHONE HYDROCHLORIDE 0.5 MILLIGRAM(S): 2 INJECTION INTRAMUSCULAR; INTRAVENOUS; SUBCUTANEOUS at 09:39

## 2019-02-14 RX ADMIN — ENOXAPARIN SODIUM 40 MILLIGRAM(S): 100 INJECTION SUBCUTANEOUS at 12:39

## 2019-02-14 RX ADMIN — Medication 650 MILLIGRAM(S): at 07:02

## 2019-02-14 RX ADMIN — PANTOPRAZOLE SODIUM 40 MILLIGRAM(S): 20 TABLET, DELAYED RELEASE ORAL at 12:40

## 2019-02-14 RX ADMIN — OXYCODONE HYDROCHLORIDE 5 MILLIGRAM(S): 5 TABLET ORAL at 19:07

## 2019-02-14 RX ADMIN — HYDROMORPHONE HYDROCHLORIDE 0.5 MILLIGRAM(S): 2 INJECTION INTRAMUSCULAR; INTRAVENOUS; SUBCUTANEOUS at 07:47

## 2019-02-14 RX ADMIN — Medication 1 DROP(S): at 06:08

## 2019-02-14 RX ADMIN — Medication 2: at 06:08

## 2019-02-14 RX ADMIN — Medication 650 MILLIGRAM(S): at 06:08

## 2019-02-14 NOTE — PROGRESS NOTE ADULT - SUBJECTIVE AND OBJECTIVE BOX
SURGERY DAILY PROGRESS NOTE:       SUBJECTIVE/ROS: Patient examined at bedside. No acute events overnight. Tolerates CLD w/o N/V. +BM/+Flatus. OOB. AVSS.          MEDICATIONS  (STANDING):  acetaminophen    Suspension .. 650 milliGRAM(s) Oral every 6 hours  artificial  tears Solution 1 Drop(s) Both EYES every 12 hours  chlorhexidine 4% Liquid 1 Application(s) Topical <User Schedule>  enoxaparin Injectable 40 milliGRAM(s) SubCutaneous daily  insulin lispro (HumaLOG) corrective regimen sliding scale   SubCutaneous every 6 hours  meropenem  IVPB 1000 milliGRAM(s) IV Intermittent every 8 hours  micafungin IVPB      micafungin IVPB 150 milliGRAM(s) IV Intermittent every 24 hours  octreotide  Injectable 100 MICROGram(s) SubCutaneous every 8 hours  pantoprazole   Suspension 40 milliGRAM(s) Oral daily  sodium chloride 0.9%. 1000 milliLiter(s) (80 mL/Hr) IV Continuous <Continuous>  vancomycin  IVPB      vancomycin  IVPB 750 milliGRAM(s) IV Intermittent every 12 hours    MEDICATIONS  (PRN):  oxyCODONE    IR 5 milliGRAM(s) Oral every 4 hours PRN moderate and severe pain      OBJECTIVE:    Vital Signs Last 24 Hrs  T(C): 36.5 (14 Feb 2019 15:52), Max: 36.6 (13 Feb 2019 20:16)  T(F): 97.7 (14 Feb 2019 15:52), Max: 97.8 (13 Feb 2019 20:16)  HR: 77 (14 Feb 2019 15:52) (72 - 87)  BP: 159/66 (14 Feb 2019 15:52) (124/57 - 172/79)  BP(mean): --  RR: 18 (14 Feb 2019 15:52) (18 - 18)  SpO2: 90% (14 Feb 2019 15:52) (83% - 98%)        I&O's Detail    13 Feb 2019 07:01  -  14 Feb 2019 07:00  --------------------------------------------------------  IN:    Enteral Tube Flush: 60 mL    Glucerna: 945 mL    IV PiggyBack: 400 mL    Oral Fluid: 220 mL    sodium chloride 0.9%.: 960 mL  Total IN: 2585 mL    OUT:    Bulb: 47.5 mL    Bulb: 50 mL    Bulb: 30 mL    VAC (Vacuum Assisted Closure) System: 410 mL    Voided: 925 mL  Total OUT: 1462.5 mL    Total NET: 1122.5 mL      14 Feb 2019 07:01  -  14 Feb 2019 19:08  --------------------------------------------------------  IN:  Total IN: 0 mL    OUT:    Bulb: 5 mL    Bulb: 17 mL    Bulb: 7 mL    Drain: 5 mL    Voided: 550 mL  Total OUT: 584 mL    Total NET: -584 mL          Daily     Daily     LABS:                        8.6    18.61 )-----------( 459      ( 14 Feb 2019 05:30 )             27.4     02-14    130<L>  |  92<L>  |  16  ----------------------------<  199<H>  4.2   |  29  |  0.45<L>    Ca    8.2<L>      14 Feb 2019 05:49  Phos  2.7     02-14  Mg     2.1     02-14      PT/INR - ( 13 Feb 2019 02:10 )   PT: 16.5 SEC;   INR: 1.47          PTT - ( 13 Feb 2019 02:10 )  PTT:32.4 SEC      General Appearance: Appears well, NAD  Neck: Supple  Chest: Equal expansion bilaterally, equal breath sounds  CV: Pulse regular presently  Abdomen soft, nontender, nondistended, incision C/D/I with midline vac in place, R - IR w/ milky output; L -IR x2 w/ SS output  Extremities: Grossly symmetric, SCD's in place

## 2019-02-14 NOTE — PROGRESS NOTE ADULT - ASSESSMENT
54y Female PMHx HTN, T2DM, asthma, depression with recent diagnosis of invasive ampullary adenocarcinoma of the CBD admitted for cholangitis on 1/5, s/p ERCP 1/8, whipple 1/11, rapid response for GIB on floor 1/18 s/p MTP, RTOR s/p celiotomy, evacuation of hematoma, GDA bleed stopped and sutured, Abthera vac. Closure of abdomen with strattice mesh on 1/21, with inna drains and left lower JOURDAN drain in subcutaneous. Incisional bleed 1/23, bedside skin staples removed, JOURDAN drain removed, hemorrhage controlled, for continuation of ICU care. S/p interventional radiology drainage of LUQ collection 2/4    PLAN:    - C/w CLD and NJ TF  - pain control as needed  - cont current abx   - c/w octreotide  - PT/oob  - lovenox for DVT ppx      D Team Surgery  [phone]

## 2019-02-15 LAB
ALBUMIN SERPL ELPH-MCNC: 2.1 G/DL — LOW (ref 3.3–5)
ALP SERPL-CCNC: 883 U/L — HIGH (ref 40–120)
ALT FLD-CCNC: 33 U/L — SIGNIFICANT CHANGE UP (ref 4–33)
ANION GAP SERPL CALC-SCNC: 10 MMO/L — SIGNIFICANT CHANGE UP (ref 7–14)
AST SERPL-CCNC: 65 U/L — HIGH (ref 4–32)
BILIRUB SERPL-MCNC: 1.5 MG/DL — HIGH (ref 0.2–1.2)
BUN SERPL-MCNC: 12 MG/DL — SIGNIFICANT CHANGE UP (ref 7–23)
CALCIUM SERPL-MCNC: 8.1 MG/DL — LOW (ref 8.4–10.5)
CHLORIDE SERPL-SCNC: 96 MMOL/L — LOW (ref 98–107)
CO2 SERPL-SCNC: 28 MMOL/L — SIGNIFICANT CHANGE UP (ref 22–31)
CREAT SERPL-MCNC: 0.43 MG/DL — LOW (ref 0.5–1.3)
GLUCOSE SERPL-MCNC: 182 MG/DL — HIGH (ref 70–99)
GRAM STN WND: SIGNIFICANT CHANGE UP
GRAM STN WND: SIGNIFICANT CHANGE UP
HCT VFR BLD CALC: 27.2 % — LOW (ref 34.5–45)
HGB BLD-MCNC: 8.7 G/DL — LOW (ref 11.5–15.5)
MAGNESIUM SERPL-MCNC: 1.9 MG/DL — SIGNIFICANT CHANGE UP (ref 1.6–2.6)
MCHC RBC-ENTMCNC: 28.7 PG — SIGNIFICANT CHANGE UP (ref 27–34)
MCHC RBC-ENTMCNC: 32 % — SIGNIFICANT CHANGE UP (ref 32–36)
MCV RBC AUTO: 89.8 FL — SIGNIFICANT CHANGE UP (ref 80–100)
METHOD TYPE: SIGNIFICANT CHANGE UP
METHOD TYPE: SIGNIFICANT CHANGE UP
NRBC # FLD: 0.02 K/UL — LOW (ref 25–125)
ORGANISM # SPEC MICROSCOPIC CNT: SIGNIFICANT CHANGE UP
PHOSPHATE SERPL-MCNC: 2.4 MG/DL — LOW (ref 2.5–4.5)
PLATELET # BLD AUTO: 472 K/UL — HIGH (ref 150–400)
PMV BLD: 10 FL — SIGNIFICANT CHANGE UP (ref 7–13)
POTASSIUM SERPL-MCNC: 4 MMOL/L — SIGNIFICANT CHANGE UP (ref 3.5–5.3)
POTASSIUM SERPL-SCNC: 4 MMOL/L — SIGNIFICANT CHANGE UP (ref 3.5–5.3)
PROT SERPL-MCNC: 5.9 G/DL — LOW (ref 6–8.3)
RBC # BLD: 3.03 M/UL — LOW (ref 3.8–5.2)
RBC # FLD: 17.2 % — HIGH (ref 10.3–14.5)
SODIUM SERPL-SCNC: 134 MMOL/L — LOW (ref 135–145)
WBC # BLD: 18.59 K/UL — HIGH (ref 3.8–10.5)
WBC # FLD AUTO: 18.59 K/UL — HIGH (ref 3.8–10.5)

## 2019-02-15 PROCEDURE — 99254 IP/OBS CNSLTJ NEW/EST MOD 60: CPT | Mod: GC

## 2019-02-15 PROCEDURE — 99222 1ST HOSP IP/OBS MODERATE 55: CPT | Mod: GC

## 2019-02-15 RX ORDER — MAGNESIUM SULFATE 500 MG/ML
1 VIAL (ML) INJECTION ONCE
Qty: 0 | Refills: 0 | Status: COMPLETED | OUTPATIENT
Start: 2019-02-15 | End: 2019-02-15

## 2019-02-15 RX ORDER — OXYCODONE HYDROCHLORIDE 5 MG/1
5 TABLET ORAL EVERY 4 HOURS
Qty: 0 | Refills: 0 | Status: DISCONTINUED | OUTPATIENT
Start: 2019-02-15 | End: 2019-02-21

## 2019-02-15 RX ORDER — HYDROMORPHONE HYDROCHLORIDE 2 MG/ML
0.5 INJECTION INTRAMUSCULAR; INTRAVENOUS; SUBCUTANEOUS ONCE
Qty: 0 | Refills: 0 | Status: DISCONTINUED | OUTPATIENT
Start: 2019-02-15 | End: 2019-02-15

## 2019-02-15 RX ORDER — PANTOPRAZOLE SODIUM 20 MG/1
40 TABLET, DELAYED RELEASE ORAL DAILY
Qty: 0 | Refills: 0 | Status: DISCONTINUED | OUTPATIENT
Start: 2019-02-15 | End: 2019-02-22

## 2019-02-15 RX ORDER — OXYCODONE HYDROCHLORIDE 5 MG/1
5 TABLET ORAL EVERY 4 HOURS
Qty: 0 | Refills: 0 | Status: DISCONTINUED | OUTPATIENT
Start: 2019-02-15 | End: 2019-02-15

## 2019-02-15 RX ADMIN — OXYCODONE HYDROCHLORIDE 5 MILLIGRAM(S): 5 TABLET ORAL at 13:07

## 2019-02-15 RX ADMIN — Medication 650 MILLIGRAM(S): at 18:04

## 2019-02-15 RX ADMIN — OXYCODONE HYDROCHLORIDE 5 MILLIGRAM(S): 5 TABLET ORAL at 04:35

## 2019-02-15 RX ADMIN — OXYCODONE HYDROCHLORIDE 5 MILLIGRAM(S): 5 TABLET ORAL at 17:04

## 2019-02-15 RX ADMIN — OXYCODONE HYDROCHLORIDE 5 MILLIGRAM(S): 5 TABLET ORAL at 18:04

## 2019-02-15 RX ADMIN — SODIUM CHLORIDE 80 MILLILITER(S): 9 INJECTION INTRAMUSCULAR; INTRAVENOUS; SUBCUTANEOUS at 09:51

## 2019-02-15 RX ADMIN — Medication 1 DROP(S): at 06:30

## 2019-02-15 RX ADMIN — PANTOPRAZOLE SODIUM 40 MILLIGRAM(S): 20 TABLET, DELAYED RELEASE ORAL at 13:09

## 2019-02-15 RX ADMIN — Medication 650 MILLIGRAM(S): at 13:06

## 2019-02-15 RX ADMIN — OXYCODONE HYDROCHLORIDE 5 MILLIGRAM(S): 5 TABLET ORAL at 08:06

## 2019-02-15 RX ADMIN — Medication 650 MILLIGRAM(S): at 06:30

## 2019-02-15 RX ADMIN — OCTREOTIDE ACETATE 100 MICROGRAM(S): 200 INJECTION, SOLUTION INTRAVENOUS; SUBCUTANEOUS at 21:00

## 2019-02-15 RX ADMIN — Medication 100 GRAM(S): at 09:51

## 2019-02-15 RX ADMIN — Medication 650 MILLIGRAM(S): at 14:05

## 2019-02-15 RX ADMIN — OXYCODONE HYDROCHLORIDE 5 MILLIGRAM(S): 5 TABLET ORAL at 09:05

## 2019-02-15 RX ADMIN — OXYCODONE HYDROCHLORIDE 5 MILLIGRAM(S): 5 TABLET ORAL at 04:04

## 2019-02-15 RX ADMIN — HYDROMORPHONE HYDROCHLORIDE 0.5 MILLIGRAM(S): 2 INJECTION INTRAMUSCULAR; INTRAVENOUS; SUBCUTANEOUS at 11:00

## 2019-02-15 RX ADMIN — Medication 650 MILLIGRAM(S): at 17:03

## 2019-02-15 RX ADMIN — MEROPENEM 100 MILLIGRAM(S): 1 INJECTION INTRAVENOUS at 13:08

## 2019-02-15 RX ADMIN — Medication 1 DROP(S): at 17:04

## 2019-02-15 RX ADMIN — OCTREOTIDE ACETATE 100 MICROGRAM(S): 200 INJECTION, SOLUTION INTRAVENOUS; SUBCUTANEOUS at 06:30

## 2019-02-15 RX ADMIN — HYDROMORPHONE HYDROCHLORIDE 0.5 MILLIGRAM(S): 2 INJECTION INTRAMUSCULAR; INTRAVENOUS; SUBCUTANEOUS at 10:37

## 2019-02-15 RX ADMIN — OXYCODONE HYDROCHLORIDE 5 MILLIGRAM(S): 5 TABLET ORAL at 14:05

## 2019-02-15 RX ADMIN — MEROPENEM 100 MILLIGRAM(S): 1 INJECTION INTRAVENOUS at 06:29

## 2019-02-15 RX ADMIN — Medication 2: at 18:05

## 2019-02-15 RX ADMIN — Medication 63.75 MILLIMOLE(S): at 09:51

## 2019-02-15 RX ADMIN — OCTREOTIDE ACETATE 100 MICROGRAM(S): 200 INJECTION, SOLUTION INTRAVENOUS; SUBCUTANEOUS at 13:10

## 2019-02-15 RX ADMIN — Medication 2: at 06:29

## 2019-02-15 RX ADMIN — MEROPENEM 100 MILLIGRAM(S): 1 INJECTION INTRAVENOUS at 21:00

## 2019-02-15 RX ADMIN — Medication 250 MILLIGRAM(S): at 01:05

## 2019-02-15 RX ADMIN — Medication 250 MILLIGRAM(S): at 13:09

## 2019-02-15 RX ADMIN — Medication 2: at 13:08

## 2019-02-15 RX ADMIN — ENOXAPARIN SODIUM 40 MILLIGRAM(S): 100 INJECTION SUBCUTANEOUS at 13:07

## 2019-02-15 RX ADMIN — MICAFUNGIN SODIUM 107.5 MILLIGRAM(S): 100 INJECTION, POWDER, LYOPHILIZED, FOR SOLUTION INTRAVENOUS at 13:06

## 2019-02-15 NOTE — PROGRESS NOTE ADULT - SUBJECTIVE AND OBJECTIVE BOX
GENERAL SURGERY DAILY PROGRESS NOTE:     Subjective:  Pt seen and examined. No acute events overnight. Pain well controlled. Tolerating tube feeds. Still on oxygen however with plan to wean off. Denies n/v. No new complaints this am. Pt w/ bowel function.     Objective:  General Appearance: Appears well, NAD  Chest: Equal expansion bilaterally, equal breath sounds  CV: Pulse regular presently  Abdomen soft, nontender, nondistended, incision C/D/I with midline vac in place, R - IR w/ milky output; L -IR x2 w/ SS output  Extremities: Grossly symmetric, SCD's in place     MEDICATIONS  (STANDING):  acetaminophen    Suspension .. 650 milliGRAM(s) Oral every 6 hours  artificial  tears Solution 1 Drop(s) Both EYES every 12 hours  chlorhexidine 4% Liquid 1 Application(s) Topical <User Schedule>  enoxaparin Injectable 40 milliGRAM(s) SubCutaneous daily  insulin lispro (HumaLOG) corrective regimen sliding scale   SubCutaneous every 6 hours  meropenem  IVPB 1000 milliGRAM(s) IV Intermittent every 8 hours  micafungin IVPB      micafungin IVPB 150 milliGRAM(s) IV Intermittent every 24 hours  octreotide  Injectable 100 MICROGram(s) SubCutaneous every 8 hours  pantoprazole   Suspension 40 milliGRAM(s) Oral daily  sodium chloride 0.9%. 1000 milliLiter(s) (80 mL/Hr) IV Continuous <Continuous>  vancomycin  IVPB      vancomycin  IVPB 750 milliGRAM(s) IV Intermittent every 12 hours    MEDICATIONS  (PRN):  oxyCODONE    IR 5 milliGRAM(s) Oral every 4 hours PRN moderate and severe pain      Vital Signs Last 24 Hrs  T(C): 36.7 (15 Feb 2019 07:51), Max: 36.8 (15 Feb 2019 05:01)  T(F): 98 (15 Feb 2019 07:51), Max: 98.2 (15 Feb 2019 05:01)  HR: 88 (15 Feb 2019 07:51) (74 - 88)  BP: 149/65 (15 Feb 2019 07:51) (123/50 - 172/79)  BP(mean): --  RR: 18 (15 Feb 2019 07:51) (18 - 18)  SpO2: 93% (15 Feb 2019 07:51) (83% - 99%)    I&O's Detail    14 Feb 2019 07:01  -  15 Feb 2019 07:00  --------------------------------------------------------  IN:    Glucerna: 540 mL    sodium chloride 0.9%.: 880 mL  Total IN: 1420 mL    OUT:    Bulb: 32 mL    Bulb: 10 mL    Bulb: 22 mL    Drain: 7.5 mL    VAC (Vacuum Assisted Closure) System: 200 mL    Voided: 1300 mL  Total OUT: 1571.5 mL    Total NET: -151.5 mL      15 Feb 2019 07:01  -  15 Feb 2019 10:49  --------------------------------------------------------  IN:  Total IN: 0 mL    OUT:    Voided: 350 mL  Total OUT: 350 mL    Total NET: -350 mL          Daily     Daily     LABS:                        8.7    18.59 )-----------( 472      ( 15 Feb 2019 06:00 )             27.2     02-15    134<L>  |  96<L>  |  12  ----------------------------<  182<H>  4.0   |  28  |  0.43<L>    Ca    8.1<L>      15 Feb 2019 06:00  Phos  2.4     02-15  Mg     1.9     02-15    TPro  5.9<L>  /  Alb  2.1<L>  /  TBili  1.5<H>  /  DBili  x   /  AST  65<H>  /  ALT  33  /  AlkPhos  883<H>  02-15          RADIOLOGY & ADDITIONAL STUDIES:

## 2019-02-15 NOTE — PROGRESS NOTE ADULT - SUBJECTIVE AND OBJECTIVE BOX
Plastic Surgery Progress Note       S: No acute events overnight, Vac change by wound care today     MEDICATIONS  (STANDING):  artificial  tears Solution 1 Drop(s) Both EYES every 12 hours  chlorhexidine 4% Liquid 1 Application(s) Topical <User Schedule>  enoxaparin Injectable 40 milliGRAM(s) SubCutaneous daily  insulin lispro (HumaLOG) corrective regimen sliding scale   SubCutaneous every 6 hours  meropenem  IVPB 1000 milliGRAM(s) IV Intermittent every 8 hours  micafungin IVPB      micafungin IVPB 150 milliGRAM(s) IV Intermittent every 24 hours  octreotide  Injectable 100 MICROGram(s) SubCutaneous every 8 hours  pantoprazole   Suspension 40 milliGRAM(s) Oral daily  vancomycin  IVPB      vancomycin  IVPB 750 milliGRAM(s) IV Intermittent every 12 hours    MEDICATIONS  (PRN):  acetaminophen    Suspension .. 650 milliGRAM(s) Oral every 6 hours PRN Mild Pain (1 - 3)  oxyCODONE    Solution 5 milliGRAM(s) Oral every 4 hours PRN Moderate Pain (4 - 6)  oxyCODONE    Solution 10 milliGRAM(s) Oral every 4 hours PRN Severe Pain (7 - 10)      Physical Exam:    Vital Signs Last 24 Hrs  T(C): 36.8 (15 Feb 2019 05:01), Max: 36.8 (15 Feb 2019 05:01)  T(F): 98.2 (15 Feb 2019 05:01), Max: 98.2 (15 Feb 2019 05:01)  HR: 74 (15 Feb 2019 05:01) (74 - 87)  BP: 138/61 (15 Feb 2019 05:01) (123/50 - 172/79)  BP(mean): --  RR: 18 (15 Feb 2019 05:01) (18 - 18)  SpO2: 98% (15 Feb 2019 05:01) (83% - 99%)    PEx:  Gen: NAD  Abdomen with dermaclose intact  Vac dressing on moderate seal to continuous suction over dermaclose (vac team to change today)  3 IR drains:  intrabdominal, 1 with greensih fluid, 1 with bloody fluid, 1 SS  1 JOURDAN: intraabdominal: fat necrosis appearing fluid utput         LABS:                             8.5    18.83 )-----------( 367      ( 11 Feb 2019 03:00 )             26.7   02-11    131<L>  |  94<L>  |  12  ----------------------------<  148<H>  3.9   |  28  |  0.42<L>    Ca    7.7<L>      11 Feb 2019 03:00  Phos  2.2     02-11  Mg     1.7     02-11    TPro  5.8<L>  /  Alb  1.7<L>  /  TBili  2.5<H>  /  DBili  1.7<H>  /  AST  38<H>  /  ALT  18  /  AlkPhos  500<H>  02-10

## 2019-02-15 NOTE — PROGRESS NOTE ADULT - ASSESSMENT
54F with history of cholangitis found to have CBD adenocarcinoma s/p Whipple procedure (on 1/11/2019) c/b GI bleed s/p RTOR for ligation (on 1/18/2019) followed by washout and placement of Strattice mesh (1/21/2019) s/p application of external tissue expander device (on 1/24/2019), s/p wound vac application (2/6/19)    - External tissue expander device to remain in place until sufficient skin laxity to allow for skin closure. Do not remove or otherwise touch this device. It will automatically tighten on its own without external intervention. It will also maintain appropriate tension on the wound over time.   - wound vac changes MWF, vac team to change today, plastics to evaluate on Monday  - Nutritional optimization.  - Medical optimization.  - Excellent Care per primary team     Plastic Surgery [id number]

## 2019-02-15 NOTE — CONSULT NOTE ADULT - SUBJECTIVE AND OBJECTIVE BOX
HPI: 53 y.o. 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 Pt offered  but deferred to daughter at bedside to translate. Per daughter, pt recently dx w ampullary adenoca of CBD on 12/2018. Admitted 1/5/19 to [facility] for abd pain, found to have cholangitis. Had ERCP on 1/8 w CBD stent placement. Had whipples procedure on 1/11. Had RRT on 1/18 for GIB; went back to OR and found to have gastroduodenal artery bleed- hemostasis obtained and hematoma evacuated. Wound vac also placed. Went back again to OR on 1/21 for abd closure w mesh placement.     Patient had acute onset of RUQ pain yesterday, with no inciting factors or relief with Aleve. The patient's family also endorsed 1-2 episodes of NBNB emesis yesterday and has had decreased PO intake over the last 36hours. As of diagnosis of cancer, the patient has not had follow up with oncology or surg-oncology due insurance issues. The patient has an appointment with [facility] on Wednesday for surgical evaluation. (05 Jan 2019 15:06)      PAST MEDICAL & SURGICAL HISTORY:  Adenocarcinoma of gallbladder  Type 2 diabetes mellitus without complication, without long-term current use of insulin  Neuropathy  Arthritis  Mild intermittent asthma without complication  Gastroesophageal reflux disease without esophagitis  Essential hypertension  No significant past surgical history      Allergies    No Known Allergies    Intolerances        ANTIMICROBIALS:  meropenem  IVPB 1000 every 8 hours  micafungin IVPB    micafungin IVPB 150 every 24 hours  vancomycin  IVPB    vancomycin  IVPB 750 every 12 hours      OTHER MEDS:  acetaminophen    Suspension .. 650 milliGRAM(s) Oral every 6 hours  artificial  tears Solution 1 Drop(s) Both EYES every 12 hours  chlorhexidine 4% Liquid 1 Application(s) Topical <User Schedule>  enoxaparin Injectable 40 milliGRAM(s) SubCutaneous daily  insulin lispro (HumaLOG) corrective regimen sliding scale   SubCutaneous every 6 hours  octreotide  Injectable 100 MICROGram(s) SubCutaneous every 8 hours  oxyCODONE    IR 5 milliGRAM(s) Oral every 4 hours PRN  pantoprazole   Suspension 40 milliGRAM(s) Oral daily  sodium chloride 0.9%. 1000 milliLiter(s) IV Continuous <Continuous>      SOCIAL HISTORY:    Marital Status:    Occupation:   Lives with:     Substance Use (street drugs):   Tobacco Usage:    Alcohol Usage: Social EtOH    FAMILY HISTORY:  No pertinent family history in first degree relatives      ROS:  Unobtainable because:   All other systems negative     Constitutional: no fever, no chills, no weight loss, no night sweats  Eye: no eye pain, no redness, no vision changes  ENT:  no sore throat, no rhinorrhea  Cardiovascular:  no chest pain, no palpitation  Respiratory:  no SOB, no cough  GI:  no abd pain, no vomiting, no diarrhea  urinary: no dysuria, no hematuria, no flank pain  : no  discharge or bleeding  musculoskeletal:  no joint pain, no joint swelling  skin:  no rash  neurology:  no headache, no seizure, no change in mental status  psych: no anxiety, no depression     Physical Exam:    General:    NAD, non toxic  Head: atraumatic, normocephalic  Eyes: normal sclera and conjunctiva  ENT:   no oropharyngeal lesions, no LAD, neck supple  Cardio:    regular S1,S2, no murmur  Respiratory:   clear b/l, no wheezing  abd:   soft, BS +, not tender, no hepatosplenomegaly  :     no CVAT, no suprapubic tenderness, no sinha  Musculoskeletal : no joint swelling, no edema  Skin:    no rash  vascular: no lines, normal pulses  Neurologic:     no focal deficits  psych: normal affect, no suicidal ideation      Drug Dosing Weight  Height (cm): 152.4 (21 Jan 2019 02:26)  Weight (kg): 57.4 (21 Jan 2019 02:26)  BMI (kg/m2): 24.7 (21 Jan 2019 02:26)  BSA (m2): 1.54 (21 Jan 2019 02:26)    Vital Signs Last 24 Hrs  T(F): 98 (02-15-19 @ 07:51), Max: 101.6 (02-08-19 @ 16:00)    Vital Signs Last 24 Hrs  HR: 88 (02-15-19 @ 07:51) (74 - 88)  BP: 149/65 (02-15-19 @ 07:51) (123/50 - 159/66)  RR: 18 (02-15-19 @ 07:51)  SpO2: 93% (02-15-19 @ 07:51) (90% - 99%)  Wt(kg): --                          8.7    18.59 )-----------( 472      ( 15 Feb 2019 06:00 )             27.2       02-15    134<L>  |  96<L>  |  12  ----------------------------<  182<H>  4.0   |  28  |  0.43<L>    Ca    8.1<L>      15 Feb 2019 06:00  Phos  2.4     02-15  Mg     1.9     02-15    TPro  5.9<L>  /  Alb  2.1<L>  /  TBili  1.5<H>  /  DBili  x   /  AST  65<H>  /  ALT  33  /  AlkPhos  883<H>  02-15          MICROBIOLOGY:  Vancomycin Level, Trough: 19.0 ug/mL (02-14-19 @ 13:45)  v  ABSCESS  02-12-19 --  --  --      ABSCESS  02-12-19 --  --  --      URINE MIDSTREAM  02-09-19 --  --  --      BLOOD  02-09-19 --  --  --      DRAINAGE  02-04-19 --  --  --      BLOOD PERIPHERAL  02-01-19 --  --  --      URINE CATHETER  01-21-19 --  --  --      BLOOD  01-21-19 --  --  --      BLOOD PERIPHERAL  01-19-19 --  --  --      BLOOD PERIPHERAL  01-18-19 --  --  BLOOD CULTURE PCR  Staphylococcus sp.,coag neg                  RADIOLOGY: HPI: 53 y.o. 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 Pt offered  but deferred to daughter at bedside to translate. Per daughter, pt recently dx w ampullary adenoca of CBD on 12/2018. Admitted 1/5/19 to [facility] for abd pain, found to have cholangitis and Ecoli bacteremia, placed on zosyn.. Had ERCP on 1/8 w CBD stent placement. Had whipples procedure on 1/11. Had RRT on 1/18 for GIB; went back to OR and found to have gastroduodenal artery bleed- hemostasis obtained and hematoma evacuated. Wound vac also placed. Admitted to SICU at that time and started on vanco, meropenem and micafungin. Went back again to OR on 1/21 for abd closure w mesh placement. Had incisional bleed on 1/23. Had persristent leukocytosis and fevers; had ctap on 2/2 showing abd colelctions; went to IR on 2/4 for drainage--> drain cx at that time were negative. Continued to have fevers, elevated wbc so repeat ct a/p performed and pt went back again to OR 2/12 for IR drainage--> 50 cc pus noted. Abscess cx showing ecoli, enterobacter cloacae and e.faecalis.      PAST MEDICAL & SURGICAL HISTORY:  Adenocarcinoma of gallbladder  Type 2 diabetes mellitus without complication, without long-term current use of insulin  Neuropathy  Arthritis  Mild intermittent asthma without complication  Gastroesophageal reflux disease without esophagitis  Essential hypertension  No significant past surgical history      Allergies    No Known Allergies    Intolerances        ANTIMICROBIALS:  meropenem  IVPB 1000 every 8 hours  micafungin IVPB    micafungin IVPB 150 every 24 hours  vancomycin  IVPB    vancomycin  IVPB 750 every 12 hours      OTHER MEDS:  acetaminophen    Suspension .. 650 milliGRAM(s) Oral every 6 hours  artificial  tears Solution 1 Drop(s) Both EYES every 12 hours  chlorhexidine 4% Liquid 1 Application(s) Topical <User Schedule>  enoxaparin Injectable 40 milliGRAM(s) SubCutaneous daily  insulin lispro (HumaLOG) corrective regimen sliding scale   SubCutaneous every 6 hours  octreotide  Injectable 100 MICROGram(s) SubCutaneous every 8 hours  oxyCODONE    IR 5 milliGRAM(s) Oral every 4 hours PRN  pantoprazole   Suspension 40 milliGRAM(s) Oral daily  sodium chloride 0.9%. 1000 milliLiter(s) IV Continuous <Continuous>      SOCIAL HISTORY:  Substance Use (street drugs): denies  Tobacco Usage:  denies  Alcohol Usage: denies    FAMILY HISTORY:  No pertinent family history in first degree relatives- no cancer in mom/dad      ROS:  All other systems negative     Constitutional:  fever, no chills  Eye: no eye pain, no redness, no vision changes  ENT:  no sore throat, no rhinorrhea  Cardiovascular:  no chest pain, no palpitation  Respiratory:  no SOB, no cough  GI:  abd pain, no vomiting, no diarrhea  urinary: no dysuria  musculoskeletal:  no joint pain, no joint swelling  skin:  no rash  neurology:  no headache    Physical Exam:    General:  NAD, non toxic  Head: atraumatic, normocehalic  Eyes: normal sclera and conjunctiva  ENT:  neck supple  Cardio:    regular S1,S2  Respiratory:   clear b/l, no wheezing  abd:   wound vac, IR drains in place  :    no sinha  Musculoskeletal : LE edema  Skin:    no rash  Neurologic:     no focal deficits  psych: normal affect    Drug Dosing Weight  Height (cm): 152.4 (21 Jan 2019 02:26)  Weight (kg): 57.4 (21 Jan 2019 02:26)  BMI (kg/m2): 24.7 (21 Jan 2019 02:26)  BSA (m2): 1.54 (21 Jan 2019 02:26)    Vital Signs Last 24 Hrs  T(F): 98 (02-15-19 @ 07:51), Max: 101.6 (02-08-19 @ 16:00)    Vital Signs Last 24 Hrs  HR: 88 (02-15-19 @ 07:51) (74 - 88)  BP: 149/65 (02-15-19 @ 07:51) (123/50 - 159/66)  RR: 18 (02-15-19 @ 07:51)  SpO2: 93% (02-15-19 @ 07:51) (90% - 99%)  Wt(kg): --                          8.7    18.59 )-----------( 472      ( 15 Feb 2019 06:00 )             27.2       02-15    134<L>  |  96<L>  |  12  ----------------------------<  182<H>  4.0   |  28  |  0.43<L>    Ca    8.1<L>      15 Feb 2019 06:00  Phos  2.4     02-15  Mg     1.9     02-15    TPro  5.9<L>  /  Alb  2.1<L>  /  TBili  1.5<H>  /  DBili  x   /  AST  65<H>  /  ALT  33  /  AlkPhos  883<H>  02-15          MICROBIOLOGY:  Vancomycin Level, Trough: 19.0 ug/mL (02-14-19 @ 13:45)    ABSCESS  02-12-19 --  --  --      ABSCESS  02-12-19 --  --  --      URINE MIDSTREAM  02-09-19 --  --  --      BLOOD  02-09-19 --  --  --      DRAINAGE  02-04-19 --  --  --      BLOOD PERIPHERAL  02-01-19 --  --  --      URINE CATHETER  01-21-19 --  --  --      BLOOD  01-21-19 --  --  --      BLOOD PERIPHERAL  01-19-19 --  --  --      BLOOD PERIPHERAL  01-18-19 --  --  BLOOD CULTURE PCR  Staphylococcus sp.,coag neg        RADIOLOGY:    ct a/p 1/5- Intra and extrahepatic biliary ductal dilatation is again noted with   pneumobilia likely related to recent ERCP. Hyperattenuation and mild wall   thickening involving the CBD raises the possibility of cholangitis;   correlate with laboratory values. CBD stent located in the distal CBD and   duodenum.    Heterogeneity of the right hepatic lobe with questionable 1.3 cm   hypoattenuating lesion in segment 7.    Heterogeneous patchy hypoenhancement involving the right kidney;   correlate clinically for pyelonephritis.    ct chest 1/7- Mildly enlarged right cardiophrenic angle lymph node is unchanged since   December 18, 2018 is indeterminate.    MR Abd w con- 1/8- *  No suspicious liver lesions. No abnormality is identified to   correspond with questionable lesion identified on prior CT.  *  Intra and extrahepatic biliary ductal dilation, with mural enhancement   which may be seen in the setting of cholangitis.  *  Cholelithiasis with gallbladder wall thickening.  ct a/p 1/30- Bilateral moderate pleural effusions with associated atelectasis.     Partially imaged right lower lung patchy opacity may represent small   infiltrate in the appropriate clinical setting.    Moderate abdominopelvic ascites most prominent subjacent to the anterior   abdominal wall.     Left upper and lower quadrant collection measuring greater than fluid   attenuation, which may reflect the presence of hemorrhagic products.    Mild enhancement is seen in association with the anterior abdominal   ascites, dependent pelvic ascites, and left mid abdominal collection,   consistent with the presence of peritonitis. Findings may be   postoperative or related to the presence of hemorrhagic products.   Infection is not excluded.  CT a/p 2/2- New diffuse peritoneal enhancement, compatible with peritonitis, more   extensive compared to 1/30/2019.    Multiple abdominal and pelvic fluid collections consistent with abscesses.      New small bowel wall may also edema and colonic wall thickening, likely   reactive.    Left upper quadrant collection measuring greater than fluid attenuation   is grossly unchanged from 1/30/2019.    Moderate left and small right pleural effusions with adjacent   atelectasis, slightly decreased from 1/30/2019.    Diffuse thickening of the colon consistent with colitis.    ct a/p 2/12- Redemonstration of peripherally enhancing abdominal collections with   decreased foci of air. Most of the collections are not significantly   changed in size. The collection along the posterior aspect of the stomach   is decreased in size.    Small right and moderate left pleural effusions with bibasilar   atelectasis.    cxr 2/13- Follow-up with moderate to large left pleural effusion, no   focal consolidations or pulmonary edema. HPI: 53 y.o. 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 Pt offered  but deferred to daughter at bedside to translate. Per daughter, pt recently dx w ampullary adenoca of CBD on 12/2018. Admitted 1/5/19 to [facility] for abd pain, found to have cholangitis and Ecoli bacteremia, placed on zosyn.. Had ERCP on 1/8 w CBD stent placement. Had whipples procedure on 1/11. Had RRT on 1/18 for GIB; went back to OR and found to have gastroduodenal artery bleed- hemostasis obtained and hematoma evacuated. Wound vac also placed. Admitted to SICU at that time and started on vanco, meropenem and micafungin. Went back again to OR on 1/21 for abd closure w mesh placement. Had incisional bleed on 1/23. Had persristent leukocytosis and fevers; had ctap on 2/2 showing abd colelctions; went to IR on 2/4 for drainage--> drain cx at that time were negative. Continued to have fevers, elevated wbc so repeat ct a/p performed and pt went back again to OR 2/12 for IR drainage--> 50 cc pus noted. Abscess cx showing ecoli, enterobacter cloacae and e.faecalis.    PAST MEDICAL & SURGICAL HISTORY:  Adenocarcinoma of gallbladder  Type 2 diabetes mellitus without complication, without long-term current use of insulin  Neuropathy  Arthritis  Mild intermittent asthma without complication  Gastroesophageal reflux disease without esophagitis  Essential hypertension  No significant past surgical history    Allergies    No Known Allergies    ANTIMICROBIALS:  meropenem  IVPB 1000 every 8 hours  micafungin IVPB    micafungin IVPB 150 every 24 hours  vancomycin  IVPB    vancomycin  IVPB 750 every 12 hours    OTHER MEDS:  acetaminophen    Suspension .. 650 milliGRAM(s) Oral every 6 hours  artificial  tears Solution 1 Drop(s) Both EYES every 12 hours  chlorhexidine 4% Liquid 1 Application(s) Topical <User Schedule>  enoxaparin Injectable 40 milliGRAM(s) SubCutaneous daily  insulin lispro (HumaLOG) corrective regimen sliding scale   SubCutaneous every 6 hours  octreotide  Injectable 100 MICROGram(s) SubCutaneous every 8 hours  oxyCODONE    IR 5 milliGRAM(s) Oral every 4 hours PRN  pantoprazole   Suspension 40 milliGRAM(s) Oral daily  sodium chloride 0.9%. 1000 milliLiter(s) IV Continuous <Continuous>    SOCIAL HISTORY:  Substance Use (street drugs): denies  Tobacco Usage:  denies  Alcohol Usage: denies    FAMILY HISTORY:  No pertinent family history in first degree relatives- no cancer in mom/dad    ROS:  All other systems negative     Constitutional:  fever, no chills  Eye: no eye pain, no redness, no vision changes  ENT:  no sore throat, no rhinorrhea  Cardiovascular:  no chest pain, no palpitation  Respiratory:  no SOB, no cough  GI:  abd pain, no vomiting, no diarrhea  urinary: no dysuria  musculoskeletal:  no joint pain, no joint swelling  skin:  no rash  neurology:  no headache    Physical Exam:    General:  NAD, non toxic  Head: atraumatic, normocehalic  Eyes: normal sclera and conjunctiva  ENT:  neck supple  Cardio:    regular S1,S2  Respiratory:   clear b/l, no wheezing  abd:   wound vac, IR drains in place  :    no sinha  Musculoskeletal : LE edema  Skin:    no rash  Neurologic:     no focal deficits  psych: normal affect    Drug Dosing Weight  Height (cm): 152.4 (21 Jan 2019 02:26)  Weight (kg): 57.4 (21 Jan 2019 02:26)  BMI (kg/m2): 24.7 (21 Jan 2019 02:26)  BSA (m2): 1.54 (21 Jan 2019 02:26)    Vital Signs Last 24 Hrs  T(F): 98 (02-15-19 @ 07:51), Max: 101.6 (02-08-19 @ 16:00)    Vital Signs Last 24 Hrs  HR: 88 (02-15-19 @ 07:51) (74 - 88)  BP: 149/65 (02-15-19 @ 07:51) (123/50 - 159/66)  RR: 18 (02-15-19 @ 07:51)  SpO2: 93% (02-15-19 @ 07:51) (90% - 99%)    Labs:                        8.7    18.59 )-----------( 472      ( 15 Feb 2019 06:00 )             27.2     02-15    134<L>  |  96<L>  |  12  ----------------------------<  182<H>  4.0   |  28  |  0.43<L>    Ca    8.1<L>      15 Feb 2019 06:00  Phos  2.4     02-15  Mg     1.9     02-15    TPro  5.9<L>  /  Alb  2.1<L>  /  TBili  1.5<H>  /  DBili  x   /  AST  65<H>  /  ALT  33  /  AlkPhos  883<H>  02-15    MICROBIOLOGY:  Vancomycin Level, Trough: 19.0 ug/mL (02-14-19 @ 13:45)    ABSCESS  02-12-19 E coli S ronak, R Zosyn, Enterococcus pending    ABSCESS  02-12-19 E coli S ronak, R Zosyn; Enterobacter pending    URINE MIDSTREAM  02-09-19 NGTD    BLOOD PERIPHERAL  01-18-19 --  --  BLOOD CULTURE PCR  Staphylococcus sp.,coag neg    RADIOLOGY:    ct a/p 1/5- Intra and extrahepatic biliary ductal dilatation is again noted with   pneumobilia likely related to recent ERCP. Hyperattenuation and mild wall   thickening involving the CBD raises the possibility of cholangitis;   correlate with laboratory values. CBD stent located in the distal CBD and   duodenum.    Heterogeneity of the right hepatic lobe with questionable 1.3 cm   hypoattenuating lesion in segment 7.    Heterogeneous patchy hypoenhancement involving the right kidney;   correlate clinically for pyelonephritis.    ct chest 1/7- Mildly enlarged right cardiophrenic angle lymph node is unchanged since   December 18, 2018 is indeterminate.    MR Abd w con- 1/8- *  No suspicious liver lesions. No abnormality is identified to   correspond with questionable lesion identified on prior CT.  *  Intra and extrahepatic biliary ductal dilation, with mural enhancement   which may be seen in the setting of cholangitis.  *  Cholelithiasis with gallbladder wall thickening.  ct a/p 1/30- Bilateral moderate pleural effusions with associated atelectasis.     Partially imaged right lower lung patchy opacity may represent small   infiltrate in the appropriate clinical setting.    Moderate abdominopelvic ascites most prominent subjacent to the anterior   abdominal wall.     Left upper and lower quadrant collection measuring greater than fluid   attenuation, which may reflect the presence of hemorrhagic products.    Mild enhancement is seen in association with the anterior abdominal   ascites, dependent pelvic ascites, and left mid abdominal collection,   consistent with the presence of peritonitis. Findings may be   postoperative or related to the presence of hemorrhagic products.   Infection is not excluded.  CT a/p 2/2- New diffuse peritoneal enhancement, compatible with peritonitis, more   extensive compared to 1/30/2019.    Multiple abdominal and pelvic fluid collections consistent with abscesses.      New small bowel wall may also edema and colonic wall thickening, likely   reactive.    Left upper quadrant collection measuring greater than fluid attenuation   is grossly unchanged from 1/30/2019.    Moderate left and small right pleural effusions with adjacent   atelectasis, slightly decreased from 1/30/2019.    Diffuse thickening of the colon consistent with colitis.    ct a/p 2/12- Redemonstration of peripherally enhancing abdominal collections with   decreased foci of air. Most of the collections are not significantly   changed in size. The collection along the posterior aspect of the stomach   is decreased in size.    Small right and moderate left pleural effusions with bibasilar   atelectasis.    cxr 2/13- Follow-up with moderate to large left pleural effusion, no   focal consolidations or pulmonary edema.

## 2019-02-15 NOTE — CONSULT NOTE ADULT - ATTENDING COMMENTS
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  Fever, leukocytosis  Prolonged hospital course with cholangitis; Whipples on 1/11; GIB; abd closure with mesh  Then found to have to have multiple abdominal collections  2/4 abd cultures neg  2/12 abd cultures now with E coli, Enterobacter, Enterococcus  CT A/P with multiple abdominal collections  Concern that patient may need further source control to multiple abd collections  Overall, fever, leukocytosis, abd abscess, post operative state, positive culture finding  - Meropenem 1g q 8  - Continue vanco for now pending cultures  - DC micafungin  - ? role for OR washout with multiple abd collections--defer to surgical team  - F/U pending cultures    Manny Olivera MD  Pager [phone]  After 5pm and on weekends call [phone]    I was physically present for the key portions of the evaluation and management service provided. I saw and examined the patient. I agree with the above history, physical, and plan except for any discrepancies which I have documented in “Attending Attestation.” Please refer to “Attending Attestation” for final plan.

## 2019-02-15 NOTE — CONSULT NOTE ADULT - ASSESSMENT
LE weakness, functional decline, pancreatic mass  PT/OT while inpt   pain control   bowel regimen  dispo- pt would benefit from rehab, she is not ready for dc as has several medical issues. She would be a candidate for acute rehab if she can increase her participation with PT.

## 2019-02-15 NOTE — CONSULT NOTE ADULT - ASSESSMENT
53 y.o. 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 Had whipple's 1/11, went back to OR 1/18 for GIB and wound vac and 1/21 for subsequent abd closure w mesh placement. Had fevers, leukocytosis, repeat ct a/p showing multiple abd collections necessitating IR drainage on 2/4 and 2/12.     Bcx (1/5)- ecoli--> bcx cleared 1/7  Bcx 1/18--> 1/2 bottles CoNS--> suspect procurement contaminant  Drain cx (2/14)- negative  Abscess cx (2/12)- ecoli, enterobacter cloacae, e faecalis    zosyn (1/5--> 1/11) (1/14--> 1/18)  meropenem (1/19 --> 1/30, 2/2-->2/15)  vanco (1/19 --> 1/29, 2/2 -->2/15)  fluconazold (1/22, 2/3-->2/5)  micafungin (1/22--> 1/29, 2/5 --> 2/14)    - d/c micafungin  - c/w vancomycin and meropenem pending abscess culture sensis  - follow up all cultures  - suspect will need to go back to OR for abd washout for better source control, suspect mesh infection    d/w primary team

## 2019-02-15 NOTE — PROGRESS NOTE ADULT - ASSESSMENT
54y Female PMHx HTN, T2DM, asthma, depression with recent diagnosis of invasive ampullary adenocarcinoma of the CBD admitted for cholangitis on 1/5, s/p ERCP 1/8, whipple 1/11, rapid response for GIB on floor 1/18 s/p MTP, RTOR s/p celiotomy, evacuation of hematoma, GDA bleed stopped and sutured, Abthera vac. Closure of abdomen with strattice mesh on 1/21, with inna drains and left lower JOURDAN drain in subcutaneous. Incisional bleed 1/23, bedside skin staples removed, JOURDAN drain removed, hemorrhage controlled, for continuation of ICU care. S/p interventional radiology drainage of LUQ collection 2/4    PLAN:    - C/w CLD and NJ TF  - pain control as needed  - cont current abx   - c/w octreotide  - vac change per PRS and wound care  - PT/oob  - lovenox for DVT ppx      D Team Surgery  [id number] 54y Female PMHx HTN, T2DM, asthma, depression with recent diagnosis of invasive ampullary adenocarcinoma of the CBD admitted for cholangitis on 1/5, s/p ERCP 1/8, whipple 1/11, rapid response for GIB on floor 1/18 s/p MTP, RTOR s/p celiotomy, evacuation of hematoma, GDA bleed stopped and sutured, Abthera vac. Closure of abdomen with strattice mesh on 1/21, with inna drains and left lower JOURDAN drain in subcutaneous. Incisional bleed 1/23, bedside skin staples removed, JOURDAN drain removed, hemorrhage controlled, for continuation of ICU care. S/p interventional radiology drainage of LUQ collection 2/4    PLAN:    - C/w CLD and NJ TF  - pain control as needed  - cont current abx, will obtain ID consult    - c/w octreotide  - vac change per PRS and wound care  - PT/oob  - lovenox for DVT ppx      D Team Surgery  [id number]

## 2019-02-15 NOTE — CONSULT NOTE ADULT - SUBJECTIVE AND OBJECTIVE BOX
53 y.o. 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  Per daughter, pt recently dx w ampullary adenoca of CBD on 12/2018. Admitted 1/5/19 to [facility] for abd pain, found to have cholangitis ,Had ERCP on 1/8 w CBD stent placement. Had whipples procedure on 1/11. Had RRT on 1/18 for GIB; went back to OR and found to have gastroduodenal artery bleed- Went back again to OR on 1/21 for abd closure w mesh placement. Continued to have fevers, elevated wbc so repeat ct a/p performed and pt went back again to OR 2/12 for IR drainage--> 50 cc pus noted. Abscess cx showing ecoli, enterobacter cloacae and e.faecalis. on  vancomycin and meropenem.       REVIEW OF SYSTEMS: No chest pain, shortness of breath, nausea, vomiting or diarhea.      PAST MEDICAL & SURGICAL HISTORY  Adenocarcinoma of gallbladder  Type 2 diabetes mellitus without complication, without long-term current use of insulin  Neuropathy  Arthritis  Mild intermittent asthma without complication  Gastroesophageal reflux disease without esophagitis  Essential hypertension  No pertinent past medical history  No significant past surgical history      SOCIAL HISTORY  Smoking - Denied, EtOH - Denied, Drugs - Denied    FUNCTIONAL HISTORY:   Lives   Independent    CURRENT FUNCTIONAL STATUS:      FAMILY HISTORY   No pertinent family history in first degree relatives      RECENT LABS/IMAGING  CBC Full  -  ( 15 Feb 2019 06:00 )  WBC Count : 18.59 K/uL  Hemoglobin : 8.7 g/dL  Hematocrit : 27.2 %  Platelet Count - Automated : 472 K/uL  Mean Cell Volume : 89.8 fL  Mean Cell Hemoglobin : 28.7 pg  Mean Cell Hemoglobin Concentration : 32.0 %  Auto Neutrophil # : x  Auto Lymphocyte # : x  Auto Monocyte # : x  Auto Eosinophil # : x  Auto Basophil # : x  Auto Neutrophil % : x  Auto Lymphocyte % : x  Auto Monocyte % : x  Auto Eosinophil % : x  Auto Basophil % : x    02-15    134<L>  |  96<L>  |  12  ----------------------------<  182<H>  4.0   |  28  |  0.43<L>    Ca    8.1<L>      15 Feb 2019 06:00  Phos  2.4     02-15  Mg     1.9     02-15    TPro  5.9<L>  /  Alb  2.1<L>  /  TBili  1.5<H>  /  DBili  x   /  AST  65<H>  /  ALT  33  /  AlkPhos  883<H>  02-15        VITALS  T(C): 37.1 (02-15-19 @ 12:56), Max: 37.1 (02-15-19 @ 12:56)  HR: 88 (02-15-19 @ 12:56) (74 - 88)  BP: 158/70 (02-15-19 @ 12:56) (123/50 - 159/66)  RR: 18 (02-15-19 @ 12:56) (18 - 18)  SpO2: 99% (02-15-19 @ 12:56) (90% - 99%)  Wt(kg): --    ALLERGIES  No Known Allergies      MEDICATIONS   acetaminophen    Suspension .. 650 milliGRAM(s) Oral every 6 hours  artificial  tears Solution 1 Drop(s) Both EYES every 12 hours  enoxaparin Injectable 40 milliGRAM(s) SubCutaneous daily  insulin lispro (HumaLOG) corrective regimen sliding scale   SubCutaneous every 6 hours  meropenem  IVPB 1000 milliGRAM(s) IV Intermittent every 8 hours  octreotide  Injectable 100 MICROGram(s) SubCutaneous every 8 hours  oxyCODONE    Solution 5 milliGRAM(s) Oral every 4 hours PRN  pantoprazole   Suspension 40 milliGRAM(s) Oral daily      ----------------------------------------------------------------------------------------  PHYSICAL EXAM  Constitutional - NAD, Comfortable  HEENT - NCAT, EOMI  Neck - Supple, No limited ROM  Chest - CTA bilaterally, No wheeze, No rhonchi, No crackles  Cardiovascular - RRR, S1S2, No murmurs  Abdomen - BS+, Soft, NTND  Extremities - No C/C/E, No calf tenderness   Neurologic Exam -                    Cognitive - Awake, Alert, AAO to self, place, date, year, situation     Communication - Fluent, No dysarthria, no aphasia     Cranial Nerves - CN 2-12 intact     Motor - No focal deficits                       Sensory - Intact to LT     Reflexes - DTR Intact, No primitive reflexive     Balance - WNL Static  Psychiatric - Mood stable, Affect WNL 53 y.o. 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  Per daughter, pt recently dx w ampullary adenoca of CBD on 12/2018. Admitted 1/5/19 to [facility] for abd pain, found to have cholangitis ,Had ERCP on 1/8 w CBD stent placement. Had whipples procedure on 1/11. Had RRT on 1/18 for GIB; went back to OR and found to have gastroduodenal artery bleed- Went back again to OR on 1/21 for abd closure w mesh placement. Continued to have fevers, elevated wbc so repeat ct a/p performed and pt went back again to OR 2/12 for IR drainage--> 50 cc pus noted. Abscess cx showing ecoli, enterobacter cloacae and e.faecalis. on  vancomycin and meropenem.   reports LE weakness.   +fatigue     REVIEW OF SYSTEMS: No chest pain, shortness of breath, nausea, vomiting or diarhea.      PAST MEDICAL & SURGICAL HISTORY  Adenocarcinoma of gallbladder  Type 2 diabetes mellitus without complication, without long-term current use of insulin  Neuropathy  Arthritis  Mild intermittent asthma without complication  Gastroesophageal reflux disease without esophagitis  Essential hypertension  No pertinent past medical history  No significant past surgical history      SOCIAL HISTORY  Smoking - Denied, EtOH - Denied, Drugs - Denied    FUNCTIONAL HISTORY:   Lives with family   Independent PTA     CURRENT FUNCTIONAL STATUS: max a       FAMILY HISTORY   No pertinent family history in first degree relatives      RECENT LABS/IMAGING  CBC Full  -  ( 15 Feb 2019 06:00 )  WBC Count : 18.59 K/uL  Hemoglobin : 8.7 g/dL  Hematocrit : 27.2 %  Platelet Count - Automated : 472 K/uL  Mean Cell Volume : 89.8 fL  Mean Cell Hemoglobin : 28.7 pg  Mean Cell Hemoglobin Concentration : 32.0 %  Auto Neutrophil # : x  Auto Lymphocyte # : x  Auto Monocyte # : x  Auto Eosinophil # : x  Auto Basophil # : x  Auto Neutrophil % : x  Auto Lymphocyte % : x  Auto Monocyte % : x  Auto Eosinophil % : x  Auto Basophil % : x    02-15    134<L>  |  96<L>  |  12  ----------------------------<  182<H>  4.0   |  28  |  0.43<L>    Ca    8.1<L>      15 Feb 2019 06:00  Phos  2.4     02-15  Mg     1.9     02-15    TPro  5.9<L>  /  Alb  2.1<L>  /  TBili  1.5<H>  /  DBili  x   /  AST  65<H>  /  ALT  33  /  AlkPhos  883<H>  02-15        VITALS  T(C): 37.1 (02-15-19 @ 12:56), Max: 37.1 (02-15-19 @ 12:56)  HR: 88 (02-15-19 @ 12:56) (74 - 88)  BP: 158/70 (02-15-19 @ 12:56) (123/50 - 159/66)  RR: 18 (02-15-19 @ 12:56) (18 - 18)  SpO2: 99% (02-15-19 @ 12:56) (90% - 99%)  Wt(kg): --    ALLERGIES  No Known Allergies      MEDICATIONS   acetaminophen    Suspension .. 650 milliGRAM(s) Oral every 6 hours  artificial  tears Solution 1 Drop(s) Both EYES every 12 hours  enoxaparin Injectable 40 milliGRAM(s) SubCutaneous daily  insulin lispro (HumaLOG) corrective regimen sliding scale   SubCutaneous every 6 hours  meropenem  IVPB 1000 milliGRAM(s) IV Intermittent every 8 hours  octreotide  Injectable 100 MICROGram(s) SubCutaneous every 8 hours  oxyCODONE    Solution 5 milliGRAM(s) Oral every 4 hours PRN  pantoprazole   Suspension 40 milliGRAM(s) Oral daily      ----------------------------------------------------------------------------------------  PHYSICAL EXAM  Constitutional - NAD, Comfortable  HEENT - NCAT, EOMI  Neck - Supple, No limited ROM  Chest - CTA bilaterally, No wheeze, No rhonchi, No crackles  Cardiovascular - RRR, S1S2, No murmurs  Abdomen - BS+, Soft, NTND  Extremities - No C/C/E, No calf tenderness   Neurologic Exam -                    Cognitive - Awake, Alert, AAO to self, place, date, year, situation     Communication - Fluent, No dysarthria, no aphasia     Cranial Nerves - CN 2-12 intact     Motor - No focal deficits                       Sensory - Intact to LT     Reflexes - DTR Intact, No primitive reflexive     Balance - WNL Static  Psychiatric - Mood stable, Affect WNL

## 2019-02-16 LAB
-  AMIKACIN: SIGNIFICANT CHANGE UP
-  AMPICILLIN/SULBACTAM: SIGNIFICANT CHANGE UP
-  AMPICILLIN: SIGNIFICANT CHANGE UP
-  AZTREONAM: SIGNIFICANT CHANGE UP
-  CEFAZOLIN: SIGNIFICANT CHANGE UP
-  CEFEPIME: SIGNIFICANT CHANGE UP
-  CEFOXITIN: SIGNIFICANT CHANGE UP
-  CEFTAZIDIME: SIGNIFICANT CHANGE UP
-  CEFTRIAXONE: SIGNIFICANT CHANGE UP
-  CIPROFLOXACIN: SIGNIFICANT CHANGE UP
-  DAPTOMYCIN: SIGNIFICANT CHANGE UP
-  ERTAPENEM: SIGNIFICANT CHANGE UP
-  GENTAMICIN: SIGNIFICANT CHANGE UP
-  IMIPENEM: SIGNIFICANT CHANGE UP
-  LEVOFLOXACIN: SIGNIFICANT CHANGE UP
-  LINEZOLID: SIGNIFICANT CHANGE UP
-  MEROPENEM: SIGNIFICANT CHANGE UP
-  PIPERACILLIN/TAZOBACTAM: SIGNIFICANT CHANGE UP
-  TETRACYCLINE: SIGNIFICANT CHANGE UP
-  TIGECYCLINE: SIGNIFICANT CHANGE UP
-  TOBRAMYCIN: SIGNIFICANT CHANGE UP
-  TRIMETHOPRIM/SULFAMETHOXAZOLE: SIGNIFICANT CHANGE UP
-  VANCOMYCIN: SIGNIFICANT CHANGE UP
ANION GAP SERPL CALC-SCNC: 11 MMO/L — SIGNIFICANT CHANGE UP (ref 7–14)
BUN SERPL-MCNC: 10 MG/DL — SIGNIFICANT CHANGE UP (ref 7–23)
CALCIUM SERPL-MCNC: 8.1 MG/DL — LOW (ref 8.4–10.5)
CHLORIDE SERPL-SCNC: 94 MMOL/L — LOW (ref 98–107)
CO2 SERPL-SCNC: 28 MMOL/L — SIGNIFICANT CHANGE UP (ref 22–31)
CREAT SERPL-MCNC: 0.42 MG/DL — LOW (ref 0.5–1.3)
CULTURE - SURGICAL SITE: SIGNIFICANT CHANGE UP
GLUCOSE SERPL-MCNC: 172 MG/DL — HIGH (ref 70–99)
HCT VFR BLD CALC: 27.5 % — LOW (ref 34.5–45)
HGB BLD-MCNC: 8.6 G/DL — LOW (ref 11.5–15.5)
MAGNESIUM SERPL-MCNC: 1.9 MG/DL — SIGNIFICANT CHANGE UP (ref 1.6–2.6)
MCHC RBC-ENTMCNC: 28.1 PG — SIGNIFICANT CHANGE UP (ref 27–34)
MCHC RBC-ENTMCNC: 31.3 % — LOW (ref 32–36)
MCV RBC AUTO: 89.9 FL — SIGNIFICANT CHANGE UP (ref 80–100)
METHOD TYPE: SIGNIFICANT CHANGE UP
METHOD TYPE: SIGNIFICANT CHANGE UP
NRBC # FLD: 0 K/UL — LOW (ref 25–125)
ORGANISM # SPEC MICROSCOPIC CNT: SIGNIFICANT CHANGE UP
ORGANISM # SPEC MICROSCOPIC CNT: SIGNIFICANT CHANGE UP
PHOSPHATE SERPL-MCNC: 2.7 MG/DL — SIGNIFICANT CHANGE UP (ref 2.5–4.5)
PLATELET # BLD AUTO: 479 K/UL — HIGH (ref 150–400)
PMV BLD: 10.2 FL — SIGNIFICANT CHANGE UP (ref 7–13)
POTASSIUM SERPL-MCNC: 4.2 MMOL/L — SIGNIFICANT CHANGE UP (ref 3.5–5.3)
POTASSIUM SERPL-SCNC: 4.2 MMOL/L — SIGNIFICANT CHANGE UP (ref 3.5–5.3)
RBC # BLD: 3.06 M/UL — LOW (ref 3.8–5.2)
RBC # FLD: 17.7 % — HIGH (ref 10.3–14.5)
SODIUM SERPL-SCNC: 133 MMOL/L — LOW (ref 135–145)
WBC # BLD: 16.07 K/UL — HIGH (ref 3.8–10.5)
WBC # FLD AUTO: 16.07 K/UL — HIGH (ref 3.8–10.5)

## 2019-02-16 PROCEDURE — 99232 SBSQ HOSP IP/OBS MODERATE 35: CPT

## 2019-02-16 RX ORDER — SODIUM,POTASSIUM PHOSPHATES 278-250MG
1 POWDER IN PACKET (EA) ORAL ONCE
Qty: 0 | Refills: 0 | Status: COMPLETED | OUTPATIENT
Start: 2019-02-16 | End: 2019-02-16

## 2019-02-16 RX ADMIN — OCTREOTIDE ACETATE 100 MICROGRAM(S): 200 INJECTION, SOLUTION INTRAVENOUS; SUBCUTANEOUS at 21:14

## 2019-02-16 RX ADMIN — Medication 650 MILLIGRAM(S): at 06:21

## 2019-02-16 RX ADMIN — Medication 1 PACKET(S): at 13:13

## 2019-02-16 RX ADMIN — OCTREOTIDE ACETATE 100 MICROGRAM(S): 200 INJECTION, SOLUTION INTRAVENOUS; SUBCUTANEOUS at 13:13

## 2019-02-16 RX ADMIN — Medication 2: at 05:59

## 2019-02-16 RX ADMIN — MEROPENEM 100 MILLIGRAM(S): 1 INJECTION INTRAVENOUS at 05:55

## 2019-02-16 RX ADMIN — MEROPENEM 100 MILLIGRAM(S): 1 INJECTION INTRAVENOUS at 13:14

## 2019-02-16 RX ADMIN — OCTREOTIDE ACETATE 100 MICROGRAM(S): 200 INJECTION, SOLUTION INTRAVENOUS; SUBCUTANEOUS at 05:56

## 2019-02-16 RX ADMIN — ENOXAPARIN SODIUM 40 MILLIGRAM(S): 100 INJECTION SUBCUTANEOUS at 13:13

## 2019-02-16 RX ADMIN — MEROPENEM 100 MILLIGRAM(S): 1 INJECTION INTRAVENOUS at 21:14

## 2019-02-16 RX ADMIN — Medication 1 DROP(S): at 05:56

## 2019-02-16 RX ADMIN — Medication 650 MILLIGRAM(S): at 05:56

## 2019-02-16 RX ADMIN — Medication 1 DROP(S): at 18:04

## 2019-02-16 NOTE — PROGRESS NOTE ADULT - SUBJECTIVE AND OBJECTIVE BOX
Plastic Surgery Progress Note       S: Patient seen and examined, vac changed yesterday (bilious fluid at base of wound), Paitent lethargic as on previous exams     MEDICATIONS  (STANDING):  artificial  tears Solution 1 Drop(s) Both EYES every 12 hours  chlorhexidine 4% Liquid 1 Application(s) Topical <User Schedule>  enoxaparin Injectable 40 milliGRAM(s) SubCutaneous daily  insulin lispro (HumaLOG) corrective regimen sliding scale   SubCutaneous every 6 hours  meropenem  IVPB 1000 milliGRAM(s) IV Intermittent every 8 hours  micafungin IVPB      micafungin IVPB 150 milliGRAM(s) IV Intermittent every 24 hours  octreotide  Injectable 100 MICROGram(s) SubCutaneous every 8 hours  pantoprazole   Suspension 40 milliGRAM(s) Oral daily  vancomycin  IVPB      vancomycin  IVPB 750 milliGRAM(s) IV Intermittent every 12 hours    MEDICATIONS  (PRN):  acetaminophen    Suspension .. 650 milliGRAM(s) Oral every 6 hours PRN Mild Pain (1 - 3)  oxyCODONE    Solution 5 milliGRAM(s) Oral every 4 hours PRN Moderate Pain (4 - 6)  oxyCODONE    Solution 10 milliGRAM(s) Oral every 4 hours PRN Severe Pain (7 - 10)      Physical Exam:    ICU Vital Signs Last 24 Hrs  T(C): 36.7 (15 Feb 2019 23:30), Max: 37.1 (15 Feb 2019 12:56)  T(F): 98 (15 Feb 2019 23:30), Max: 98.8 (15 Feb 2019 12:56)  HR: 80 (15 Feb 2019 23:30) (78 - 88)  BP: 140/63 (15 Feb 2019 23:30) (139/63 - 158/70)  BP(mean): --  ABP: --  ABP(mean): --  RR: 18 (15 Feb 2019 23:30) (18 - 18)  SpO2: 100% (15 Feb 2019 23:30) (93% - 100%)      PEx:  Gen: lethargic  Abdomen with dermaclose intact  Vac dressing on moderate seal to continuous suction over dermaclose (vac team to change today)  3 IR drains:  intrabdominal, 1 with greensih fluid, 1 with bloody fluid, 1 SS  1 JOURDAN: intraabdominal: fat necrosis appearing fluid output         LABS:                             8.5    18.83 )-----------( 367      ( 11 Feb 2019 03:00 )             26.7   02-11    131<L>  |  94<L>  |  12  ----------------------------<  148<H>  3.9   |  28  |  0.42<L>    Ca    7.7<L>      11 Feb 2019 03:00  Phos  2.2     02-11  Mg     1.7     02-11    TPro  5.8<L>  /  Alb  1.7<L>  /  TBili  2.5<H>  /  DBili  1.7<H>  /  AST  38<H>  /  ALT  18  /  AlkPhos  500<H>  02-10

## 2019-02-16 NOTE — PROGRESS NOTE ADULT - ASSESSMENT
54F with history of cholangitis found to have CBD adenocarcinoma s/p Whipple procedure (on 1/11/2019) c/b GI bleed s/p RTOR for ligation (on 1/18/2019) followed by washout and placement of Strattice mesh (1/21/2019) s/p application of external tissue expander device (on 1/24/2019), s/p wound vac application (2/6/19)    - External tissue expander device to remain in place until sufficient skin laxity to allow for skin closure. Do not remove or otherwise touch this device. It will automatically tighten on its own without external intervention. It will also maintain appropriate tension on the wound over time.   - Wound vac changes MWF, will tentatively plan to close most of incision next week (possibly monday) and place ostomy bag over wound   - Nutritional optimization.  - Medical optimization.  - Excellent Care per primary team     Plastic Surgery [id number]

## 2019-02-16 NOTE — PROGRESS NOTE ADULT - SUBJECTIVE AND OBJECTIVE BOX
GENERAL SURGERY DAILY PROGRESS NOTE:     Subjective:  Pt seen and examined. No acute events overnight. Pain well controlled. tolerating tube feeds but not taking much PO. Vac changed yesterday. Denies n/v/f/c. Per plastics, may go to the OR for closure this week.     Objective:  General Appearance: Appears well, NAD  Chest: Equal expansion bilaterally, equal breath sounds  CV: Pulse regular presently  Abdomen soft, nontender, nondistended, incision C/D/I with midline vac in place, R - IR w/ milky output; L -IR x2 w/ SS output  Extremities: Grossly symmetric, SCD's in place     MEDICATIONS  (STANDING):  acetaminophen    Suspension .. 650 milliGRAM(s) Oral every 6 hours  artificial  tears Solution 1 Drop(s) Both EYES every 12 hours  enoxaparin Injectable 40 milliGRAM(s) SubCutaneous daily  insulin lispro (HumaLOG) corrective regimen sliding scale   SubCutaneous every 6 hours  meropenem  IVPB 1000 milliGRAM(s) IV Intermittent every 8 hours  octreotide  Injectable 100 MICROGram(s) SubCutaneous every 8 hours  pantoprazole   Suspension 40 milliGRAM(s) Oral daily  potassium phosphate / sodium phosphate powder 1 Packet(s) Oral once    MEDICATIONS  (PRN):  oxyCODONE    Solution 5 milliGRAM(s) Oral every 4 hours PRN moderate and severe pain      Vital Signs Last 24 Hrs  T(C): 36.7 (15 Feb 2019 23:30), Max: 37.1 (15 Feb 2019 12:56)  T(F): 98 (15 Feb 2019 23:30), Max: 98.8 (15 Feb 2019 12:56)  HR: 80 (15 Feb 2019 23:30) (78 - 88)  BP: 140/63 (15 Feb 2019 23:30) (139/63 - 158/70)  BP(mean): --  RR: 18 (15 Feb 2019 23:30) (18 - 18)  SpO2: 100% (15 Feb 2019 23:30) (97% - 100%)    I&O's Detail    15 Feb 2019 07:01  -  16 Feb 2019 07:00  --------------------------------------------------------  IN:  Total IN: 0 mL    OUT:    Bulb: 7.5 mL    Bulb: 20 mL    Bulb: 25 mL    VAC (Vacuum Assisted Closure) System: 100 mL    Voided: 1750 mL  Total OUT: 1902.5 mL    Total NET: -1902.5 mL      16 Feb 2019 07:01  -  16 Feb 2019 11:19  --------------------------------------------------------  IN:    Enteral Tube Flush: 60 mL    Oral Fluid: 120 mL  Total IN: 180 mL    OUT:    Bulb: 5 mL    Bulb: 15 mL    Voided: 350 mL  Total OUT: 370 mL    Total NET: -190 mL          Daily     Daily     LABS:                        8.6    16.07 )-----------( 479      ( 16 Feb 2019 05:10 )             27.5     02-16    133<L>  |  94<L>  |  10  ----------------------------<  172<H>  4.2   |  28  |  0.42<L>    Ca    8.1<L>      16 Feb 2019 05:10  Phos  2.7     02-16  Mg     1.9     02-16    TPro  5.9<L>  /  Alb  2.1<L>  /  TBili  1.5<H>  /  DBili  x   /  AST  65<H>  /  ALT  33  /  AlkPhos  883<H>  02-15          RADIOLOGY & ADDITIONAL STUDIES:

## 2019-02-16 NOTE — PROGRESS NOTE ADULT - SUBJECTIVE AND OBJECTIVE BOX
Follow Up:      Inverval History/ROS:Patient is a 54y old  Female who presents with a chief complaint of cholangitis (16 Feb 2019 11:18)  Afebrile.      Allergies    No Known Allergies    Intolerances        ANTIMICROBIALS:  meropenem  IVPB 1000 every 8 hours      OTHER MEDS:  acetaminophen    Suspension .. 650 milliGRAM(s) Oral every 6 hours  artificial  tears Solution 1 Drop(s) Both EYES every 12 hours  enoxaparin Injectable 40 milliGRAM(s) SubCutaneous daily  insulin lispro (HumaLOG) corrective regimen sliding scale   SubCutaneous every 6 hours  octreotide  Injectable 100 MICROGram(s) SubCutaneous every 8 hours  oxyCODONE    Solution 5 milliGRAM(s) Oral every 4 hours PRN  pantoprazole   Suspension 40 milliGRAM(s) Oral daily  potassium phosphate / sodium phosphate powder 1 Packet(s) Oral once      Vital Signs Last 24 Hrs  T(C): 36.7 (15 Feb 2019 23:30), Max: 37.1 (15 Feb 2019 12:56)  T(F): 98 (15 Feb 2019 23:30), Max: 98.8 (15 Feb 2019 12:56)  HR: 80 (15 Feb 2019 23:30) (78 - 88)  BP: 140/63 (15 Feb 2019 23:30) (139/63 - 158/70)  BP(mean): --  RR: 18 (15 Feb 2019 23:30) (18 - 18)  SpO2: 100% (15 Feb 2019 23:30) (97% - 100%)    PHYSICAL EXAM:  General: [ x] non-toxic  HEAD/EYES: [ ] PERRL [x ] white sclera [ ] icterus  ENT:  [ ] normal [ ] supple [ ] thrush [ ] pharyngeal exudate  Cardiovascular:   [ ] murmur [x ] normal [ ] PPM/AICD  Respiratory:  [x ] clear to ausculation bilaterally  GI:  [ ] soft, + tender, normal bowel sounds  :  [ ] sinha [x ] no CVA tenderness   Musculoskeletal:  [x ] no synovitis  Neurologic:  [x ] non-focal exam   Skin:  [ x] no rash  Lymph: [x ] no lymphadenopathy  Psychiatric:  [x ] appropriate affect [ ] alert & oriented  Lines:  [xx ] no phlebitis [ ] central line                                8.6    16.07 )-----------( 479      ( 16 Feb 2019 05:10 )             27.5       02-16    133<L>  |  94<L>  |  10  ----------------------------<  172<H>  4.2   |  28  |  0.42<L>    Ca    8.1<L>      16 Feb 2019 05:10  Phos  2.7     02-16  Mg     1.9     02-16    TPro  5.9<L>  /  Alb  2.1<L>  /  TBili  1.5<H>  /  DBili  x   /  AST  65<H>  /  ALT  33  /  AlkPhos  883<H>  02-15          MICROBIOLOGY:Culture - Surgical Site:   EC^Escherichia coli  ENTF^Enterococcus faecalis (02-12-19 @ 18:48)  Culture - Surgical Site:   EC^Escherichia coli  ECL^Enterobacter cloacae (02-12-19 @ 18:38)      RADIOLOGY:

## 2019-02-16 NOTE — PROGRESS NOTE ADULT - ASSESSMENT
54y Female PMHx HTN, T2DM, asthma, depression with recent diagnosis of invasive ampullary adenocarcinoma of the CBD admitted for cholangitis on 1/5, s/p ERCP 1/8, whipple 1/11, rapid response for GIB on floor 1/18 s/p MTP, RTOR s/p celiotomy, evacuation of hematoma, GDA bleed stopped and sutured, Abthera vac. Closure of abdomen with strattice mesh on 1/21, with inna drains and left lower JOURDAN drain in subcutaneous. Incisional bleed 1/23, bedside skin staples removed, JOURADN drain removed, hemorrhage controlled, for continuation of ICU care. S/p interventional radiology drainage of LUQ collection 2/4    PLAN:    - f/u plastics plan  - C/w CLD and NJ TF - nocturnal feeds to encourage PO intake  - pain control as needed  - cont current abx, will obtain ID consult    - c/w octreotide  - vac change per PRS and wound care  - PT/oob  - lovenox for DVT ppx  - discussed w/ Dr. Webber    D Team Surgery  [id number] 54y Female PMHx HTN, T2DM, asthma, depression with recent diagnosis of invasive ampullary adenocarcinoma of the CBD admitted for cholangitis on 1/5, s/p ERCP 1/8, whipple 1/11, rapid response for GIB on floor 1/18 s/p MTP, RTOR s/p celiotomy, evacuation of hematoma, GDA bleed stopped and sutured, Abthera vac. Closure of abdomen with strattice mesh on 1/21, with inna drains and left lower JOURDAN drain in subcutaneous. Incisional bleed 1/23, bedside skin staples removed, JOURDAN drain removed, hemorrhage controlled, for continuation of ICU care. S/p interventional radiology drainage of LUQ collection 2/4    PLAN:    - f/u plastics plan  - C/w CLD and NJ TF - nocturnal feeds to encourage PO intake  - pain control as needed  - cont meropenem   - c/w octreotide  - vac change per PRS and wound care  - PT/oob  - lovenox for DVT ppx  - discussed w/ Dr. Webber    D Team Surgery  [id number]

## 2019-02-16 NOTE — PROGRESS NOTE ADULT - ASSESSMENT
53 y.o. 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 Had whipple's 1/11, went back to OR 1/18 for GIB and wound vac and 1/21 for subsequent abd closure w mesh placement. Had fevers, leukocytosis, repeat ct a/p showing multiple abd collections necessitating IR drainage on 2/4 and 2/12.     1) Invasive adenocarcinoma: S/p whippple    2) Abd pain: due to malignancy/ infection    3) Leukocytosis:   Elevated  Continue to monitor    4) Abdominal abscess  Cultures with E coli, enterobacter, enterococcus  Continue meropenem  Continue vanco for now pending Enterococcus sensi    Source control will be key- surgical team following    4) Fever:  monitor on broad spectrum abx

## 2019-02-17 LAB
ANION GAP SERPL CALC-SCNC: 9 MMO/L — SIGNIFICANT CHANGE UP (ref 7–14)
BUN SERPL-MCNC: 12 MG/DL — SIGNIFICANT CHANGE UP (ref 7–23)
CALCIUM SERPL-MCNC: 8.2 MG/DL — LOW (ref 8.4–10.5)
CHLORIDE SERPL-SCNC: 94 MMOL/L — LOW (ref 98–107)
CO2 SERPL-SCNC: 29 MMOL/L — SIGNIFICANT CHANGE UP (ref 22–31)
CREAT SERPL-MCNC: 0.44 MG/DL — LOW (ref 0.5–1.3)
GLUCOSE SERPL-MCNC: 168 MG/DL — HIGH (ref 70–99)
HCT VFR BLD CALC: 28.2 % — LOW (ref 34.5–45)
HGB BLD-MCNC: 8.8 G/DL — LOW (ref 11.5–15.5)
MAGNESIUM SERPL-MCNC: 1.9 MG/DL — SIGNIFICANT CHANGE UP (ref 1.6–2.6)
MCHC RBC-ENTMCNC: 27.8 PG — SIGNIFICANT CHANGE UP (ref 27–34)
MCHC RBC-ENTMCNC: 31.2 % — LOW (ref 32–36)
MCV RBC AUTO: 89.2 FL — SIGNIFICANT CHANGE UP (ref 80–100)
NRBC # FLD: 0 K/UL — LOW (ref 25–125)
PHOSPHATE SERPL-MCNC: 2.9 MG/DL — SIGNIFICANT CHANGE UP (ref 2.5–4.5)
PLATELET # BLD AUTO: 464 K/UL — HIGH (ref 150–400)
PMV BLD: 9.7 FL — SIGNIFICANT CHANGE UP (ref 7–13)
POTASSIUM SERPL-MCNC: 4.1 MMOL/L — SIGNIFICANT CHANGE UP (ref 3.5–5.3)
POTASSIUM SERPL-SCNC: 4.1 MMOL/L — SIGNIFICANT CHANGE UP (ref 3.5–5.3)
RBC # BLD: 3.16 M/UL — LOW (ref 3.8–5.2)
RBC # FLD: 17.6 % — HIGH (ref 10.3–14.5)
SODIUM SERPL-SCNC: 132 MMOL/L — LOW (ref 135–145)
WBC # BLD: 12.37 K/UL — HIGH (ref 3.8–10.5)
WBC # FLD AUTO: 12.37 K/UL — HIGH (ref 3.8–10.5)

## 2019-02-17 RX ORDER — MAGNESIUM SULFATE 500 MG/ML
2 VIAL (ML) INJECTION ONCE
Qty: 0 | Refills: 0 | Status: COMPLETED | OUTPATIENT
Start: 2019-02-17 | End: 2019-02-17

## 2019-02-17 RX ADMIN — OCTREOTIDE ACETATE 100 MICROGRAM(S): 200 INJECTION, SOLUTION INTRAVENOUS; SUBCUTANEOUS at 05:36

## 2019-02-17 RX ADMIN — Medication 650 MILLIGRAM(S): at 13:28

## 2019-02-17 RX ADMIN — ENOXAPARIN SODIUM 40 MILLIGRAM(S): 100 INJECTION SUBCUTANEOUS at 12:58

## 2019-02-17 RX ADMIN — MEROPENEM 100 MILLIGRAM(S): 1 INJECTION INTRAVENOUS at 22:59

## 2019-02-17 RX ADMIN — MEROPENEM 100 MILLIGRAM(S): 1 INJECTION INTRAVENOUS at 05:35

## 2019-02-17 RX ADMIN — Medication 1 DROP(S): at 05:36

## 2019-02-17 RX ADMIN — Medication 2: at 17:44

## 2019-02-17 RX ADMIN — OCTREOTIDE ACETATE 100 MICROGRAM(S): 200 INJECTION, SOLUTION INTRAVENOUS; SUBCUTANEOUS at 22:59

## 2019-02-17 RX ADMIN — Medication 2: at 07:32

## 2019-02-17 RX ADMIN — OCTREOTIDE ACETATE 100 MICROGRAM(S): 200 INJECTION, SOLUTION INTRAVENOUS; SUBCUTANEOUS at 13:04

## 2019-02-17 RX ADMIN — Medication 1 DROP(S): at 17:45

## 2019-02-17 RX ADMIN — Medication 2: at 01:24

## 2019-02-17 RX ADMIN — Medication 50 GRAM(S): at 13:03

## 2019-02-17 RX ADMIN — PANTOPRAZOLE SODIUM 40 MILLIGRAM(S): 20 TABLET, DELAYED RELEASE ORAL at 12:58

## 2019-02-17 RX ADMIN — Medication 650 MILLIGRAM(S): at 17:44

## 2019-02-17 RX ADMIN — Medication 650 MILLIGRAM(S): at 12:58

## 2019-02-17 RX ADMIN — Medication 650 MILLIGRAM(S): at 18:14

## 2019-02-17 RX ADMIN — MEROPENEM 100 MILLIGRAM(S): 1 INJECTION INTRAVENOUS at 13:04

## 2019-02-17 RX ADMIN — Medication 2: at 13:01

## 2019-02-17 NOTE — PROGRESS NOTE ADULT - ASSESSMENT
54y Female PMHx HTN, T2DM, asthma, depression with recent diagnosis of invasive ampullary adenocarcinoma of the CBD admitted for cholangitis on 1/5, s/p ERCP 1/8, whipple 1/11, rapid response for GIB on floor 1/18 s/p MTP, RTOR s/p celiotomy, evacuation of hematoma, GDA bleed stopped and sutured, Abthera vac. Closure of abdomen with strattice mesh on 1/21, with inna drains and left lower JOURDAN drain in subcutaneous. Incisional bleed 1/23, bedside skin staples removed, JOURDAN drain removed, hemorrhage controlled, for continuation of ICU care. S/p interventional radiology drainage of LUQ collection 2/4    PLAN:  - f/u plastics plan  - nocturnal tube feeds plus diet during day  - pain control as needed  - cont meropenem   - c/w octreotide  - vac change per PRS and wound care  - PT/oob  - lovenox for DVT ppx    D Team Surgery  [id number]

## 2019-02-17 NOTE — PROGRESS NOTE ADULT - SUBJECTIVE AND OBJECTIVE BOX
GENERAL SURGERY DAILY PROGRESS NOTE:     Subjective:  Pt seen and examined. No acute events overnight. Yesterday off tube feeds during day and had minimal PO intake. Now on nocturnal tube feeds only. Pain well controlled. Vac holding suction. No new complaints.     Objective:  General Appearance: Appears well, NAD  Chest: Equal expansion bilaterally, equal breath sounds  CV: Pulse regular presently  Abdomen soft, nontender, nondistended, incision C/D/I with midline vac in place, R - IR w/ milky output; L -IR x2 w/ SS output  Extremities: Grossly symmetric, SCD's in place     MEDICATIONS  (STANDING):  acetaminophen    Suspension .. 650 milliGRAM(s) Oral every 6 hours  artificial  tears Solution 1 Drop(s) Both EYES every 12 hours  enoxaparin Injectable 40 milliGRAM(s) SubCutaneous daily  insulin lispro (HumaLOG) corrective regimen sliding scale   SubCutaneous every 6 hours  magnesium sulfate  IVPB 2 Gram(s) IV Intermittent once  meropenem  IVPB 1000 milliGRAM(s) IV Intermittent every 8 hours  octreotide  Injectable 100 MICROGram(s) SubCutaneous every 8 hours  pantoprazole   Suspension 40 milliGRAM(s) Oral daily    MEDICATIONS  (PRN):  oxyCODONE    Solution 5 milliGRAM(s) Oral every 4 hours PRN moderate and severe pain      Vital Signs Last 24 Hrs  T(C): 36.5 (17 Feb 2019 04:39), Max: 37.3 (16 Feb 2019 17:07)  T(F): 97.7 (17 Feb 2019 04:39), Max: 99.2 (16 Feb 2019 17:07)  HR: 78 (17 Feb 2019 04:39) (76 - 87)  BP: 130/59 (17 Feb 2019 04:39) (127/62 - 149/63)  BP(mean): 76 (16 Feb 2019 11:51) (76 - 76)  RR: 18 (17 Feb 2019 04:39) (18 - 18)  SpO2: 98% (17 Feb 2019 04:39) (96% - 100%)    I&O's Detail    16 Feb 2019 07:01  -  17 Feb 2019 07:00  --------------------------------------------------------  IN:    Enteral Tube Flush: 60 mL    Oral Fluid: 360 mL  Total IN: 420 mL    OUT:    Bulb: 23 mL    Bulb: 12 mL    Bulb: 5 mL    Voided: 950 mL  Total OUT: 990 mL    Total NET: -570 mL          Daily     Daily     LABS:                        8.8    12.37 )-----------( 464      ( 17 Feb 2019 06:55 )             28.2     02-17    132<L>  |  94<L>  |  12  ----------------------------<  168<H>  4.1   |  29  |  0.44<L>    Ca    8.2<L>      17 Feb 2019 06:55  Phos  2.9     02-17  Mg     1.9     02-17            RADIOLOGY & ADDITIONAL STUDIES:

## 2019-02-18 LAB
ALBUMIN SERPL ELPH-MCNC: 2.4 G/DL — LOW (ref 3.3–5)
ALP SERPL-CCNC: 1177 U/L — HIGH (ref 40–120)
ALT FLD-CCNC: 37 U/L — HIGH (ref 4–33)
ANION GAP SERPL CALC-SCNC: 10 MMO/L — SIGNIFICANT CHANGE UP (ref 7–14)
AST SERPL-CCNC: 74 U/L — HIGH (ref 4–32)
BILIRUB DIRECT SERPL-MCNC: 1 MG/DL — HIGH (ref 0.1–0.2)
BILIRUB SERPL-MCNC: 1.7 MG/DL — HIGH (ref 0.2–1.2)
BUN SERPL-MCNC: 11 MG/DL — SIGNIFICANT CHANGE UP (ref 7–23)
CALCIUM SERPL-MCNC: 8.5 MG/DL — SIGNIFICANT CHANGE UP (ref 8.4–10.5)
CHLORIDE SERPL-SCNC: 94 MMOL/L — LOW (ref 98–107)
CO2 SERPL-SCNC: 28 MMOL/L — SIGNIFICANT CHANGE UP (ref 22–31)
CREAT SERPL-MCNC: 0.45 MG/DL — LOW (ref 0.5–1.3)
GLUCOSE SERPL-MCNC: 169 MG/DL — HIGH (ref 70–99)
HCT VFR BLD CALC: 29.1 % — LOW (ref 34.5–45)
HGB BLD-MCNC: 9.3 G/DL — LOW (ref 11.5–15.5)
MAGNESIUM SERPL-MCNC: 2.2 MG/DL — SIGNIFICANT CHANGE UP (ref 1.6–2.6)
MCHC RBC-ENTMCNC: 27.8 PG — SIGNIFICANT CHANGE UP (ref 27–34)
MCHC RBC-ENTMCNC: 32 % — SIGNIFICANT CHANGE UP (ref 32–36)
MCV RBC AUTO: 87.1 FL — SIGNIFICANT CHANGE UP (ref 80–100)
NRBC # FLD: 0 K/UL — LOW (ref 25–125)
PHOSPHATE SERPL-MCNC: 2.8 MG/DL — SIGNIFICANT CHANGE UP (ref 2.5–4.5)
PLATELET # BLD AUTO: 458 K/UL — HIGH (ref 150–400)
PMV BLD: 9.7 FL — SIGNIFICANT CHANGE UP (ref 7–13)
POTASSIUM SERPL-MCNC: 4.1 MMOL/L — SIGNIFICANT CHANGE UP (ref 3.5–5.3)
POTASSIUM SERPL-SCNC: 4.1 MMOL/L — SIGNIFICANT CHANGE UP (ref 3.5–5.3)
PROT SERPL-MCNC: 6.9 G/DL — SIGNIFICANT CHANGE UP (ref 6–8.3)
RBC # BLD: 3.34 M/UL — LOW (ref 3.8–5.2)
RBC # FLD: 17.5 % — HIGH (ref 10.3–14.5)
SODIUM SERPL-SCNC: 132 MMOL/L — LOW (ref 135–145)
WBC # BLD: 11 K/UL — HIGH (ref 3.8–10.5)
WBC # FLD AUTO: 11 K/UL — HIGH (ref 3.8–10.5)

## 2019-02-18 PROCEDURE — 99232 SBSQ HOSP IP/OBS MODERATE 35: CPT

## 2019-02-18 RX ORDER — LINEZOLID 600 MG/300ML
600 INJECTION, SOLUTION INTRAVENOUS EVERY 12 HOURS
Qty: 0 | Refills: 0 | Status: DISCONTINUED | OUTPATIENT
Start: 2019-02-18 | End: 2019-02-25

## 2019-02-18 RX ORDER — ATORVASTATIN CALCIUM 80 MG/1
10 TABLET, FILM COATED ORAL AT BEDTIME
Qty: 0 | Refills: 0 | Status: DISCONTINUED | OUTPATIENT
Start: 2019-02-18 | End: 2019-03-05

## 2019-02-18 RX ORDER — MONTELUKAST 4 MG/1
10 TABLET, CHEWABLE ORAL DAILY
Qty: 0 | Refills: 0 | Status: DISCONTINUED | OUTPATIENT
Start: 2019-02-18 | End: 2019-03-05

## 2019-02-18 RX ORDER — SODIUM BICARBONATE 1 MEQ/ML
325 SYRINGE (ML) INTRAVENOUS ONCE
Qty: 0 | Refills: 0 | Status: COMPLETED | OUTPATIENT
Start: 2019-02-18 | End: 2019-02-19

## 2019-02-18 RX ORDER — GABAPENTIN 400 MG/1
100 CAPSULE ORAL THREE TIMES A DAY
Qty: 0 | Refills: 0 | Status: DISCONTINUED | OUTPATIENT
Start: 2019-02-19 | End: 2019-03-05

## 2019-02-18 RX ORDER — LIDOCAINE HCL 20 MG/ML
30 VIAL (ML) INJECTION ONCE
Qty: 0 | Refills: 0 | Status: DISCONTINUED | OUTPATIENT
Start: 2019-02-18 | End: 2019-03-05

## 2019-02-18 RX ADMIN — OCTREOTIDE ACETATE 100 MICROGRAM(S): 200 INJECTION, SOLUTION INTRAVENOUS; SUBCUTANEOUS at 06:23

## 2019-02-18 RX ADMIN — OCTREOTIDE ACETATE 100 MICROGRAM(S): 200 INJECTION, SOLUTION INTRAVENOUS; SUBCUTANEOUS at 13:35

## 2019-02-18 RX ADMIN — Medication 1 DROP(S): at 06:23

## 2019-02-18 RX ADMIN — OCTREOTIDE ACETATE 100 MICROGRAM(S): 200 INJECTION, SOLUTION INTRAVENOUS; SUBCUTANEOUS at 21:40

## 2019-02-18 RX ADMIN — Medication 2: at 13:36

## 2019-02-18 RX ADMIN — Medication 650 MILLIGRAM(S): at 18:54

## 2019-02-18 RX ADMIN — MEROPENEM 100 MILLIGRAM(S): 1 INJECTION INTRAVENOUS at 06:23

## 2019-02-18 RX ADMIN — MEROPENEM 100 MILLIGRAM(S): 1 INJECTION INTRAVENOUS at 15:53

## 2019-02-18 RX ADMIN — ENOXAPARIN SODIUM 40 MILLIGRAM(S): 100 INJECTION SUBCUTANEOUS at 13:33

## 2019-02-18 RX ADMIN — Medication 2: at 06:23

## 2019-02-18 RX ADMIN — Medication 650 MILLIGRAM(S): at 13:33

## 2019-02-18 RX ADMIN — Medication 650 MILLIGRAM(S): at 19:44

## 2019-02-18 RX ADMIN — LINEZOLID 300 MILLIGRAM(S): 600 INJECTION, SOLUTION INTRAVENOUS at 18:52

## 2019-02-18 RX ADMIN — MEROPENEM 100 MILLIGRAM(S): 1 INJECTION INTRAVENOUS at 21:40

## 2019-02-18 RX ADMIN — Medication 650 MILLIGRAM(S): at 14:00

## 2019-02-18 RX ADMIN — ATORVASTATIN CALCIUM 10 MILLIGRAM(S): 80 TABLET, FILM COATED ORAL at 22:35

## 2019-02-18 RX ADMIN — Medication 1 DROP(S): at 18:53

## 2019-02-18 RX ADMIN — PANTOPRAZOLE SODIUM 40 MILLIGRAM(S): 20 TABLET, DELAYED RELEASE ORAL at 13:35

## 2019-02-18 RX ADMIN — Medication 63.75 MILLIMOLE(S): at 15:54

## 2019-02-18 NOTE — PROGRESS NOTE ADULT - SUBJECTIVE AND OBJECTIVE BOX
Follow Up:      Inverval History/ROS:Patient is a 54y old  Female who presents with a chief complaint of cholangitis (18 Feb 2019 10:03)  Pain control is better.  no fever      Allergies    No Known Allergies    Intolerances        ANTIMICROBIALS:  linezolid  IVPB 600 every 12 hours  meropenem  IVPB 1000 every 8 hours      OTHER MEDS:  acetaminophen    Suspension .. 650 milliGRAM(s) Oral every 6 hours  artificial  tears Solution 1 Drop(s) Both EYES every 12 hours  enoxaparin Injectable 40 milliGRAM(s) SubCutaneous daily  insulin lispro (HumaLOG) corrective regimen sliding scale   SubCutaneous every 6 hours  lidocaine 1%/EPINEPHrine 1:100,000 Inj 30 milliLiter(s) Local Injection once  octreotide  Injectable 100 MICROGram(s) SubCutaneous every 8 hours  oxyCODONE    Solution 5 milliGRAM(s) Oral every 4 hours PRN  pantoprazole   Suspension 40 milliGRAM(s) Oral daily  sodium phosphate IVPB 15 milliMole(s) IV Intermittent once      Vital Signs Last 24 Hrs  T(C): 36.6 (18 Feb 2019 09:00), Max: 37.1 (18 Feb 2019 06:12)  T(F): 97.9 (18 Feb 2019 09:00), Max: 98.7 (18 Feb 2019 06:12)  HR: 90 (18 Feb 2019 09:00) (77 - 90)  BP: 150/66 (18 Feb 2019 09:00) (126/59 - 152/72)  BP(mean): --  RR: 18 (18 Feb 2019 09:00) (17 - 18)  SpO2: 99% (18 Feb 2019 09:00) (92% - 99%)    PHYSICAL EXAM:  General: [ x] non-toxic  HEAD/EYES: [ ] PERRL [x ] white sclera [ ] icterus  ENT:  [ ] normal [x ] supple [ ] thrush [ ] pharyngeal exudate  Cardiovascular:   [ ] murmur [x ] normal [ ] PPM/AICD  Respiratory:  [ ] clear to ausculation bilaterally  GI:  [x ] soft, non-tender, normal bowel sounds  :  [ ] sinha [ ] no CVA tenderness   Musculoskeletal:  [ ] no synovitis  Neurologic:  [x ] non-focal exam   Skin:  [ ] no rash  Lymph: [ ] no lymphadenopathy  Psychiatric:  [x ] appropriate affect [ ] alert & oriented  Lines:  [ x] no phlebitis [ ] central line                                9.3    11.00 )-----------( 458      ( 18 Feb 2019 06:12 )             29.1       02-18    132<L>  |  94<L>  |  11  ----------------------------<  169<H>  4.1   |  28  |  0.45<L>    Ca    8.5      18 Feb 2019 06:12  Phos  2.8     02-18  Mg     2.2     02-18    TPro  6.9  /  Alb  2.4<L>  /  TBili  1.7<H>  /  DBili  1.0<H>  /  AST  74<H>  /  ALT  37<H>  /  AlkPhos  1177<H>  02-18          MICROBIOLOGY:Culture - Surgical Site:   EC^Escherichia coli  ENTF^Enterococcus faecalis (02-12-19 @ 18:48)  Culture - Surgical Site:   RESULT CALLED TO / READ BACK: Carin-CHRISTIANO LUBIN/LEONA  DATE / TIME CALLED: 02/16/19 1129  CALLED BY: MARGUERITE LONG (02-12-19 @ 18:48)  Culture - Surgical Site:   EC^Escherichia coli  ECL^Enterobacter cloacae (02-12-19 @ 18:38)      RADIOLOGY:

## 2019-02-18 NOTE — PROGRESS NOTE ADULT - SUBJECTIVE AND OBJECTIVE BOX
SURGERY DAILY PROGRESS NOTE:       SUBJECTIVE/ROS: Patient examined at bedside. No acute events overnight. Increased PO w/o N/V. Tolerates nocturnal feeds well. +BM/+Flatus. OOB.          MEDICATIONS  (STANDING):  acetaminophen    Suspension .. 650 milliGRAM(s) Oral every 6 hours  artificial  tears Solution 1 Drop(s) Both EYES every 12 hours  enoxaparin Injectable 40 milliGRAM(s) SubCutaneous daily  insulin lispro (HumaLOG) corrective regimen sliding scale   SubCutaneous every 6 hours  lidocaine 1%/EPINEPHrine 1:100,000 Inj 30 milliLiter(s) Local Injection once  meropenem  IVPB 1000 milliGRAM(s) IV Intermittent every 8 hours  octreotide  Injectable 100 MICROGram(s) SubCutaneous every 8 hours  pantoprazole   Suspension 40 milliGRAM(s) Oral daily  sodium phosphate IVPB 15 milliMole(s) IV Intermittent once    MEDICATIONS  (PRN):  oxyCODONE    Solution 5 milliGRAM(s) Oral every 4 hours PRN moderate and severe pain      OBJECTIVE:    Vital Signs Last 24 Hrs  T(C): 36.6 (18 Feb 2019 09:00), Max: 37.1 (18 Feb 2019 06:12)  T(F): 97.9 (18 Feb 2019 09:00), Max: 98.7 (18 Feb 2019 06:12)  HR: 90 (18 Feb 2019 09:00) (77 - 90)  BP: 150/66 (18 Feb 2019 09:00) (126/59 - 152/72)  BP(mean): --  RR: 18 (18 Feb 2019 09:00) (17 - 18)  SpO2: 99% (18 Feb 2019 09:00) (92% - 99%)        I&O's Detail    17 Feb 2019 07:01  -  18 Feb 2019 07:00  --------------------------------------------------------  IN:    Enteral Tube Flush: 120 mL    Glucerna: 540 mL    Oral Fluid: 480 mL  Total IN: 1140 mL    OUT:    Bulb: 15 mL    Bulb: 10 mL    Bulb: 5 mL    Voided: 1250 mL  Total OUT: 1280 mL    Total NET: -140 mL          Daily     Daily     LABS:                        9.3    11.00 )-----------( 458      ( 18 Feb 2019 06:12 )             29.1     02-18    132<L>  |  94<L>  |  11  ----------------------------<  169<H>  4.1   |  28  |  0.45<L>    Ca    8.5      18 Feb 2019 06:12  Phos  2.8     02-18  Mg     2.2     02-18    TPro  6.9  /  Alb  2.4<L>  /  TBili  1.7<H>  /  DBili  1.0<H>  /  AST  74<H>  /  ALT  37<H>  /  AlkPhos  1177<H>  02-18        Objective:  General Appearance: Appears well, NAD  Chest: Equal expansion bilaterally, equal breath sounds  CV: Pulse regular presently  Abdomen soft, nontender, nondistended, incision C/D/I with midline vac in place, R - IR w/ milky output; L -IR x2 w/ SS output  Extremities: Grossly symmetric, SCD's in place

## 2019-02-18 NOTE — PROGRESS NOTE ADULT - ASSESSMENT
53 y.o. 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 Had whipple's 1/11, went back to OR 1/18 for GIB and wound vac and 1/21 for subsequent abd closure w mesh placement. Had fevers, leukocytosis, repeat ct a/p showing multiple abd collections necessitating IR drainage on 2/4 and 2/12.     1) Invasive adenocarcinoma: S/p whippple    2) Abd pain: due to malignancy/ infection    3) Leukocytosis:   Elevated  Continue to monitor    4) Abdominal abscess  Cultures with E coli, enterobacter, enterococcus  Continue meropenem  Cx with VRE- unclear how significant of a pathogen enterococcus is- but will add linezolid for now.    Source control will be key- surgical team following    4) Fever:  monitor on broad spectrum abx

## 2019-02-18 NOTE — PROGRESS NOTE ADULT - ASSESSMENT
54F with history of cholangitis found to have CBD adenocarcinoma s/p Whipple procedure (on 1/11/2019) c/b GI bleed s/p RTOR for ligation (on 1/18/2019) followed by washout and placement of Strattice mesh (1/21/2019) s/p application of external tissue expander device (on 1/24/2019), s/p wound vac application (2/6/19)    - Plan to close most of wound and remove tissue expander today. Will also place ostomy bag over wound  - Nutritional optimization.  - Medical optimization.  - Excellent Care per primary team     Plastic Surgery [id number]

## 2019-02-18 NOTE — PROGRESS NOTE ADULT - SUBJECTIVE AND OBJECTIVE BOX
Plastic Surgery Progress Note       S: Patient seen and examined, vac changed Friday. Stable overnight     MEDICATIONS  (STANDING):  artificial  tears Solution 1 Drop(s) Both EYES every 12 hours  chlorhexidine 4% Liquid 1 Application(s) Topical <User Schedule>  enoxaparin Injectable 40 milliGRAM(s) SubCutaneous daily  insulin lispro (HumaLOG) corrective regimen sliding scale   SubCutaneous every 6 hours  meropenem  IVPB 1000 milliGRAM(s) IV Intermittent every 8 hours  micafungin IVPB      micafungin IVPB 150 milliGRAM(s) IV Intermittent every 24 hours  octreotide  Injectable 100 MICROGram(s) SubCutaneous every 8 hours  pantoprazole   Suspension 40 milliGRAM(s) Oral daily  vancomycin  IVPB      vancomycin  IVPB 750 milliGRAM(s) IV Intermittent every 12 hours    MEDICATIONS  (PRN):  acetaminophen    Suspension .. 650 milliGRAM(s) Oral every 6 hours PRN Mild Pain (1 - 3)  oxyCODONE    Solution 5 milliGRAM(s) Oral every 4 hours PRN Moderate Pain (4 - 6)  oxyCODONE    Solution 10 milliGRAM(s) Oral every 4 hours PRN Severe Pain (7 - 10)      Physical Exam:    ICU Vital Signs Last 24 Hrs  T(C): 37.1 (18 Feb 2019 06:12), Max: 37.1 (18 Feb 2019 06:12)  T(F): 98.7 (18 Feb 2019 06:12), Max: 98.7 (18 Feb 2019 06:12)  HR: 90 (18 Feb 2019 06:12) (77 - 90)  BP: 152/72 (18 Feb 2019 06:12) (126/59 - 152/72)  BP(mean): 85 (17 Feb 2019 09:46) (85 - 85)  ABP: --  ABP(mean): --  RR: 17 (18 Feb 2019 06:12) (17 - 18)  SpO2: 92% (18 Feb 2019 06:12) (92% - 99%)        PEx:  Gen: lethargic  Abdomen with dermaclose intact  Vac dressing on moderate seal to continuous suction over dermaclose   3 IR drains:  intrabdominal, 1 with greensih fluid, 1 with bloody fluid, 1 SS  1 JOURDAN: intraabdominal: fat necrosis appearing fluid output         LABS:                             8.5    18.83 )-----------( 367      ( 11 Feb 2019 03:00 )             26.7   02-11    131<L>  |  94<L>  |  12  ----------------------------<  148<H>  3.9   |  28  |  0.42<L>    Ca    7.7<L>      11 Feb 2019 03:00  Phos  2.2     02-11  Mg     1.7     02-11    TPro  5.8<L>  /  Alb  1.7<L>  /  TBili  2.5<H>  /  DBili  1.7<H>  /  AST  38<H>  /  ALT  18  /  AlkPhos  500<H>  02-10

## 2019-02-18 NOTE — PROGRESS NOTE ADULT - ASSESSMENT
54y Female PMHx HTN, T2DM, asthma, depression with recent diagnosis of invasive ampullary adenocarcinoma of the CBD admitted for cholangitis on 1/5, s/p ERCP 1/8, whipple 1/11, rapid response for GIB on floor 1/18 s/p MTP, RTOR s/p celiotomy, evacuation of hematoma, GDA bleed stopped and sutured, Abthera vac. Closure of abdomen with strattice mesh on 1/21, with inna drains and left lower JOURDAN drain in subcutaneous. Incisional bleed 1/23, bedside skin staples removed, JOURDAN drain removed, hemorrhage controlled, for continuation of ICU care. S/p interventional radiology drainage of LUQ collection 2/4    PLAN:  - f/u plastics plan - plan on completing closure today w/ placing ostomy bag over fistula   - nocturnal tube feeds plus regular diet during day  - pain control as needed  - cont meropenem   - c/w octreotide  - PT/oob  - lovenox for DVT ppx      D Team Surgery

## 2019-02-18 NOTE — CHART NOTE - NSCHARTNOTEFT_GEN_A_CORE
After obtaining informed consent, the patient's abdominal wound VAC was removed. External tissue expander device (Dermaclose) was removed. Bilious drainage was present in the wound. The wound was prepped with betadine. 10 mL of 1% lidocaine was infiltrated in the skin around the incision. The abdominal wound was partially closed with interrupted 2-0 Prolene vertical mattress sutures. An ostomy appliance was placed over the open portion of the wound. Continue ostomy appliance. Change PRN.    Brandyn Barriga MD PGY2  Plastic Surgery (pg MANINDER: [id number], NS: [phone])

## 2019-02-19 LAB
ALBUMIN SERPL ELPH-MCNC: 2.4 G/DL — LOW (ref 3.3–5)
ALP SERPL-CCNC: 1082 U/L — HIGH (ref 40–120)
ALT FLD-CCNC: 36 U/L — HIGH (ref 4–33)
ANION GAP SERPL CALC-SCNC: 12 MMO/L — SIGNIFICANT CHANGE UP (ref 7–14)
AST SERPL-CCNC: 65 U/L — HIGH (ref 4–32)
BILIRUB SERPL-MCNC: 1.5 MG/DL — HIGH (ref 0.2–1.2)
BUN SERPL-MCNC: 10 MG/DL — SIGNIFICANT CHANGE UP (ref 7–23)
CALCIUM SERPL-MCNC: 8.4 MG/DL — SIGNIFICANT CHANGE UP (ref 8.4–10.5)
CHLORIDE SERPL-SCNC: 94 MMOL/L — LOW (ref 98–107)
CO2 SERPL-SCNC: 27 MMOL/L — SIGNIFICANT CHANGE UP (ref 22–31)
CREAT SERPL-MCNC: 0.49 MG/DL — LOW (ref 0.5–1.3)
GLUCOSE SERPL-MCNC: 171 MG/DL — HIGH (ref 70–99)
HCT VFR BLD CALC: 27.6 % — LOW (ref 34.5–45)
HGB BLD-MCNC: 8.9 G/DL — LOW (ref 11.5–15.5)
MAGNESIUM SERPL-MCNC: 2 MG/DL — SIGNIFICANT CHANGE UP (ref 1.6–2.6)
MCHC RBC-ENTMCNC: 28.5 PG — SIGNIFICANT CHANGE UP (ref 27–34)
MCHC RBC-ENTMCNC: 32.2 % — SIGNIFICANT CHANGE UP (ref 32–36)
MCV RBC AUTO: 88.5 FL — SIGNIFICANT CHANGE UP (ref 80–100)
NRBC # FLD: 0 K/UL — LOW (ref 25–125)
PHOSPHATE SERPL-MCNC: 3 MG/DL — SIGNIFICANT CHANGE UP (ref 2.5–4.5)
PLATELET # BLD AUTO: 392 K/UL — SIGNIFICANT CHANGE UP (ref 150–400)
PMV BLD: 9.8 FL — SIGNIFICANT CHANGE UP (ref 7–13)
POTASSIUM SERPL-MCNC: 3.7 MMOL/L — SIGNIFICANT CHANGE UP (ref 3.5–5.3)
POTASSIUM SERPL-SCNC: 3.7 MMOL/L — SIGNIFICANT CHANGE UP (ref 3.5–5.3)
PROT SERPL-MCNC: 6.6 G/DL — SIGNIFICANT CHANGE UP (ref 6–8.3)
RBC # BLD: 3.12 M/UL — LOW (ref 3.8–5.2)
RBC # FLD: 17.2 % — HIGH (ref 10.3–14.5)
SODIUM SERPL-SCNC: 133 MMOL/L — LOW (ref 135–145)
SPECIMEN SOURCE: SIGNIFICANT CHANGE UP
SPECIMEN SOURCE: SIGNIFICANT CHANGE UP
WBC # BLD: 10.6 K/UL — HIGH (ref 3.8–10.5)
WBC # FLD AUTO: 10.6 K/UL — HIGH (ref 3.8–10.5)

## 2019-02-19 PROCEDURE — 99232 SBSQ HOSP IP/OBS MODERATE 35: CPT

## 2019-02-19 PROCEDURE — 74176 CT ABD & PELVIS W/O CONTRAST: CPT | Mod: 26

## 2019-02-19 RX ORDER — POTASSIUM CHLORIDE 20 MEQ
40 PACKET (EA) ORAL ONCE
Qty: 0 | Refills: 0 | Status: DISCONTINUED | OUTPATIENT
Start: 2019-02-19 | End: 2019-02-19

## 2019-02-19 RX ORDER — POTASSIUM CHLORIDE 20 MEQ
40 PACKET (EA) ORAL ONCE
Qty: 0 | Refills: 0 | Status: COMPLETED | OUTPATIENT
Start: 2019-02-19 | End: 2019-02-19

## 2019-02-19 RX ORDER — INSULIN LISPRO 100/ML
VIAL (ML) SUBCUTANEOUS
Qty: 0 | Refills: 0 | Status: DISCONTINUED | OUTPATIENT
Start: 2019-02-19 | End: 2019-02-20

## 2019-02-19 RX ORDER — INSULIN LISPRO 100/ML
VIAL (ML) SUBCUTANEOUS AT BEDTIME
Qty: 0 | Refills: 0 | Status: DISCONTINUED | OUTPATIENT
Start: 2019-02-19 | End: 2019-02-20

## 2019-02-19 RX ADMIN — OCTREOTIDE ACETATE 100 MICROGRAM(S): 200 INJECTION, SOLUTION INTRAVENOUS; SUBCUTANEOUS at 12:45

## 2019-02-19 RX ADMIN — LINEZOLID 300 MILLIGRAM(S): 600 INJECTION, SOLUTION INTRAVENOUS at 05:58

## 2019-02-19 RX ADMIN — GABAPENTIN 100 MILLIGRAM(S): 400 CAPSULE ORAL at 05:57

## 2019-02-19 RX ADMIN — Medication 325 MILLIGRAM(S): at 08:19

## 2019-02-19 RX ADMIN — MONTELUKAST 10 MILLIGRAM(S): 4 TABLET, CHEWABLE ORAL at 12:45

## 2019-02-19 RX ADMIN — Medication 2: at 05:57

## 2019-02-19 RX ADMIN — Medication 650 MILLIGRAM(S): at 12:45

## 2019-02-19 RX ADMIN — LINEZOLID 300 MILLIGRAM(S): 600 INJECTION, SOLUTION INTRAVENOUS at 17:27

## 2019-02-19 RX ADMIN — Medication 2: at 18:01

## 2019-02-19 RX ADMIN — MEROPENEM 100 MILLIGRAM(S): 1 INJECTION INTRAVENOUS at 05:57

## 2019-02-19 RX ADMIN — MEROPENEM 100 MILLIGRAM(S): 1 INJECTION INTRAVENOUS at 12:45

## 2019-02-19 RX ADMIN — GABAPENTIN 100 MILLIGRAM(S): 400 CAPSULE ORAL at 23:09

## 2019-02-19 RX ADMIN — Medication 1 DROP(S): at 17:27

## 2019-02-19 RX ADMIN — ENOXAPARIN SODIUM 40 MILLIGRAM(S): 100 INJECTION SUBCUTANEOUS at 12:44

## 2019-02-19 RX ADMIN — GABAPENTIN 100 MILLIGRAM(S): 400 CAPSULE ORAL at 12:45

## 2019-02-19 RX ADMIN — OCTREOTIDE ACETATE 100 MICROGRAM(S): 200 INJECTION, SOLUTION INTRAVENOUS; SUBCUTANEOUS at 05:57

## 2019-02-19 RX ADMIN — Medication 1 DROP(S): at 05:57

## 2019-02-19 RX ADMIN — PANTOPRAZOLE SODIUM 40 MILLIGRAM(S): 20 TABLET, DELAYED RELEASE ORAL at 12:44

## 2019-02-19 RX ADMIN — MEROPENEM 100 MILLIGRAM(S): 1 INJECTION INTRAVENOUS at 23:09

## 2019-02-19 RX ADMIN — Medication 2: at 12:45

## 2019-02-19 RX ADMIN — Medication 40 MILLIEQUIVALENT(S): at 08:18

## 2019-02-19 RX ADMIN — Medication 650 MILLIGRAM(S): at 13:15

## 2019-02-19 RX ADMIN — OCTREOTIDE ACETATE 100 MICROGRAM(S): 200 INJECTION, SOLUTION INTRAVENOUS; SUBCUTANEOUS at 23:09

## 2019-02-19 RX ADMIN — ATORVASTATIN CALCIUM 10 MILLIGRAM(S): 80 TABLET, FILM COATED ORAL at 23:09

## 2019-02-19 NOTE — PROGRESS NOTE ADULT - ASSESSMENT
54y Female PMHx HTN, T2DM, asthma, depression with recent diagnosis of invasive ampullary adenocarcinoma of the CBD admitted for cholangitis on 1/5, s/p ERCP 1/8, whipple 1/11, rapid response for GIB on floor 1/18 s/p MTP, RTOR s/p celiotomy, evacuation of hematoma, GDA bleed stopped and sutured, Abthera vac. Closure of abdomen with strattice mesh on 1/21, with inna drains and left lower JOURDAN drain in subcutaneous. Incisional bleed 1/23, bedside skin staples removed, JOURDAN drain removed, hemorrhage controlled, for continuation of ICU care. S/p interventional radiology drainage of LUQ collection 2/4    PLAN:  - f/u plastics -  s/p completing closure today w/ placing ostomy bag over fistula 2/18  - d/c feeding tube, pt tolerating PO  - continue calorie count  - pain control as needed  - cont meropenem, linezolid per ID   - c/w octreotide  - PT/oob  - lovenox for DVT ppx    D Team Surgery

## 2019-02-19 NOTE — PROGRESS NOTE ADULT - ASSESSMENT
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  Fever, leukocytosis  Prolonged hospital course with cholangitis; Whipples on 1/11; GIB; abd closure with mesh  Then found to have to have multiple abdominal collections  2/4 abd cultures neg  2/12 abd cultures now with E coli, Enterobacter, Enterococcus (VRE)  CT A/P with multiple abdominal collections  Concern that patient may need further source control to multiple abd collections  Seems improving--no fevers, WBC improving  Uncertain contribution VRE to infection, but treat for now  Overall, fever, leukocytosis, abd abscess, post operative state, positive culture finding  - Meropenem 1g q 8  - Zyvox 600mg q 12  - Planned for repeat CT  - ? role for OR washout with multiple abd collections--defer to surgical team    Manny Olivera MD  Pager [phone]  After 5pm and on weekends call [phone]

## 2019-02-19 NOTE — PROGRESS NOTE ADULT - SUBJECTIVE AND OBJECTIVE BOX
GENERAL SURGERY DAILY PROGRESS NOTE:     Subjective:  Pt seen and examined. No acute events overnight. Tolerating PO intake during the day and nocturnal TF. Denies n/v. Pain well controlled. Reports discomfort with tube. PRS removed vac and placed ostomy appliance over abdominal wound yesterday.     Objective:  General: Appears well, NAD  Chest: Equal expansion bilaterally  Abdomen soft, nontender, nondistended, incision C/D/I with ostomy appliance around midline wound w/ minimal output, R - IR w/ milky output; L -IR x2 w/ SS output  Extremities: Grossly symmetric, SCD's in place     MEDICATIONS  (STANDING):  acetaminophen    Suspension .. 650 milliGRAM(s) Oral every 6 hours  artificial  tears Solution 1 Drop(s) Both EYES every 12 hours  atorvastatin 10 milliGRAM(s) Oral at bedtime  enoxaparin Injectable 40 milliGRAM(s) SubCutaneous daily  gabapentin 100 milliGRAM(s) Oral three times a day  insulin lispro (HumaLOG) corrective regimen sliding scale   SubCutaneous three times a day before meals  insulin lispro (HumaLOG) corrective regimen sliding scale   SubCutaneous at bedtime  lidocaine 1% Injectable 30 milliLiter(s) Local Injection once  linezolid  IVPB 600 milliGRAM(s) IV Intermittent every 12 hours  meropenem  IVPB 1000 milliGRAM(s) IV Intermittent every 8 hours  montelukast 10 milliGRAM(s) Oral daily  octreotide  Injectable 100 MICROGram(s) SubCutaneous every 8 hours  pantoprazole   Suspension 40 milliGRAM(s) Oral daily    MEDICATIONS  (PRN):  oxyCODONE    Solution 5 milliGRAM(s) Oral every 4 hours PRN moderate and severe pain      Vital Signs Last 24 Hrs  T(C): 36.8 (19 Feb 2019 12:37), Max: 36.9 (18 Feb 2019 23:25)  T(F): 98.3 (19 Feb 2019 12:37), Max: 98.4 (18 Feb 2019 23:25)  HR: 89 (19 Feb 2019 12:37) (75 - 89)  BP: 148/60 (19 Feb 2019 12:37) (129/61 - 148/60)  BP(mean): --  RR: 18 (19 Feb 2019 12:37) (17 - 18)  SpO2: 96% (19 Feb 2019 12:37) (96% - 100%)    I&O's Detail    18 Feb 2019 07:01  -  19 Feb 2019 07:00  --------------------------------------------------------  IN:    Enteral Tube Flush: 60 mL    Glucerna: 540 mL    Oral Fluid: 120 mL  Total IN: 720 mL    OUT:    Bulb: 5 mL    Bulb: 5 mL    Bulb: 5 mL    Drain: 40 mL    Voided: 750 mL  Total OUT: 805 mL    Total NET: -85 mL      19 Feb 2019 07:01  -  19 Feb 2019 14:18  --------------------------------------------------------  IN:  Total IN: 0 mL    OUT:    Voided: 300 mL  Total OUT: 300 mL    Total NET: -300 mL          Daily     Daily     LABS:                        8.9    10.60 )-----------( 392      ( 19 Feb 2019 05:05 )             27.6     02-19    133<L>  |  94<L>  |  10  ----------------------------<  171<H>  3.7   |  27  |  0.49<L>    Ca    8.4      19 Feb 2019 05:05  Phos  3.0     02-19  Mg     2.0     02-19    TPro  6.6  /  Alb  2.4<L>  /  TBili  1.5<H>  /  DBili  x   /  AST  65<H>  /  ALT  36<H>  /  AlkPhos  1082<H>  02-19          RADIOLOGY & ADDITIONAL STUDIES:

## 2019-02-19 NOTE — PROGRESS NOTE ADULT - SUBJECTIVE AND OBJECTIVE BOX
CC: F/U Abscess    Saw/spoke to patient. No fevers, no chills. Feels improved over weekend. Feels stronger. No new complaints.    Allergies  No Known Allergies    ANTIMICROBIALS:  linezolid  IVPB 600 every 12 hours  meropenem  IVPB 1000 every 8 hours    PE:    Vital Signs Last 24 Hrs  T(C): 36.8 (19 Feb 2019 08:23), Max: 36.9 (18 Feb 2019 12:00)  T(F): 98.2 (19 Feb 2019 08:23), Max: 98.5 (18 Feb 2019 12:00)  HR: 87 (19 Feb 2019 08:23) (75 - 87)  BP: 142/63 (19 Feb 2019 08:23) (129/61 - 142/63)  RR: 18 (19 Feb 2019 08:23) (17 - 18)  SpO2: 98% (19 Feb 2019 08:23) (96% - 100%)    Gen: AOx3, NAD, non-toxic, pleasant, OOB in chair  CV: S1+S2 normal, nontachycardic  Resp: Clear bilat, no resp distress, no crackles/wheezes  Abd: Soft, nontender, +BS, JOURDAN drain with purulent material, abd binder  Ext: No LE edema, no wounds    LABS:                        8.9    10.60 )-----------( 392      ( 19 Feb 2019 05:05 )             27.6     02-19    133<L>  |  94<L>  |  10  ----------------------------<  171<H>  3.7   |  27  |  0.49<L>    Ca    8.4      19 Feb 2019 05:05  Phos  3.0     02-19  Mg     2.0     02-19    TPro  6.6  /  Alb  2.4<L>  /  TBili  1.5<H>  /  DBili  x   /  AST  65<H>  /  ALT  36<H>  /  AlkPhos  1082<H>  02-19    MICROBIOLOGY:    ABSCESS  02-12-19 --  --  Escherichia coli  Enterococcus faecalis VRE    ABSCESS  02-12-19 --  --  Escherichia coli  Enterobacter cloacae    URINE MIDSTREAM  02-09-19 NGTD    (otherwise reviewed)    RADIOLOGY:    2/13 CXR:    INTERPRETATION:     Moderate to large layering left effusion is present. Decreasing right   pleural effusion with a trace left.   An enteric tube and left-sided   pigtail catheter are unchanged. No focal consolidations. No signs of   pulmonary edema on the current study.      COMPARISON:  February 12      IMPRESSION:  Follow-up with moderate to large left pleural effusion, no   focal consolidations or pulmonary edema.

## 2019-02-20 LAB
ANION GAP SERPL CALC-SCNC: 10 MMO/L — SIGNIFICANT CHANGE UP (ref 7–14)
APPEARANCE UR: CLEAR — SIGNIFICANT CHANGE UP
APTT BLD: 32 SEC — SIGNIFICANT CHANGE UP (ref 27.5–36.3)
BACTERIA # UR AUTO: SIGNIFICANT CHANGE UP
BILIRUB UR-MCNC: NEGATIVE — SIGNIFICANT CHANGE UP
BLOOD UR QL VISUAL: SIGNIFICANT CHANGE UP
BUN SERPL-MCNC: 8 MG/DL — SIGNIFICANT CHANGE UP (ref 7–23)
CALCIUM SERPL-MCNC: 8.4 MG/DL — SIGNIFICANT CHANGE UP (ref 8.4–10.5)
CHLORIDE SERPL-SCNC: 97 MMOL/L — LOW (ref 98–107)
CO2 SERPL-SCNC: 25 MMOL/L — SIGNIFICANT CHANGE UP (ref 22–31)
COLOR SPEC: YELLOW — SIGNIFICANT CHANGE UP
CREAT SERPL-MCNC: 0.5 MG/DL — SIGNIFICANT CHANGE UP (ref 0.5–1.3)
GLUCOSE SERPL-MCNC: 134 MG/DL — HIGH (ref 70–99)
GLUCOSE UR-MCNC: NEGATIVE — SIGNIFICANT CHANGE UP
HCT VFR BLD CALC: 28.6 % — LOW (ref 34.5–45)
HGB BLD-MCNC: 9 G/DL — LOW (ref 11.5–15.5)
HYALINE CASTS # UR AUTO: SIGNIFICANT CHANGE UP
INR BLD: 1.3 — HIGH (ref 0.88–1.17)
KETONES UR-MCNC: NEGATIVE — SIGNIFICANT CHANGE UP
LEUKOCYTE ESTERASE UR-ACNC: NEGATIVE — SIGNIFICANT CHANGE UP
MAGNESIUM SERPL-MCNC: 1.9 MG/DL — SIGNIFICANT CHANGE UP (ref 1.6–2.6)
MCHC RBC-ENTMCNC: 28.1 PG — SIGNIFICANT CHANGE UP (ref 27–34)
MCHC RBC-ENTMCNC: 31.5 % — LOW (ref 32–36)
MCV RBC AUTO: 89.4 FL — SIGNIFICANT CHANGE UP (ref 80–100)
NITRITE UR-MCNC: NEGATIVE — SIGNIFICANT CHANGE UP
NRBC # FLD: 0 K/UL — LOW (ref 25–125)
PH UR: 7.5 — SIGNIFICANT CHANGE UP (ref 5–8)
PHOSPHATE SERPL-MCNC: 2.7 MG/DL — SIGNIFICANT CHANGE UP (ref 2.5–4.5)
PLATELET # BLD AUTO: 403 K/UL — HIGH (ref 150–400)
PMV BLD: 9.7 FL — SIGNIFICANT CHANGE UP (ref 7–13)
POTASSIUM SERPL-MCNC: 4.3 MMOL/L — SIGNIFICANT CHANGE UP (ref 3.5–5.3)
POTASSIUM SERPL-SCNC: 4.3 MMOL/L — SIGNIFICANT CHANGE UP (ref 3.5–5.3)
PROT UR-MCNC: 30 — SIGNIFICANT CHANGE UP
PROTHROM AB SERPL-ACNC: 14.9 SEC — HIGH (ref 9.8–13.1)
RBC # BLD: 3.2 M/UL — LOW (ref 3.8–5.2)
RBC # FLD: 17.2 % — HIGH (ref 10.3–14.5)
RBC CASTS # UR COMP ASSIST: HIGH (ref 0–?)
SODIUM SERPL-SCNC: 132 MMOL/L — LOW (ref 135–145)
SP GR SPEC: 1.01 — SIGNIFICANT CHANGE UP (ref 1–1.04)
SQUAMOUS # UR AUTO: SIGNIFICANT CHANGE UP
UROBILINOGEN FLD QL: NORMAL — SIGNIFICANT CHANGE UP
WBC # BLD: 11.77 K/UL — HIGH (ref 3.8–10.5)
WBC # FLD AUTO: 11.77 K/UL — HIGH (ref 3.8–10.5)
WBC UR QL: HIGH (ref 0–?)

## 2019-02-20 PROCEDURE — 99232 SBSQ HOSP IP/OBS MODERATE 35: CPT | Mod: GC

## 2019-02-20 PROCEDURE — 49424 ASSESS CYST CONTRAST INJECT: CPT

## 2019-02-20 PROCEDURE — 76080 X-RAY EXAM OF FISTULA: CPT | Mod: 26

## 2019-02-20 PROCEDURE — 99232 SBSQ HOSP IP/OBS MODERATE 35: CPT

## 2019-02-20 RX ORDER — DEXTROSE 50 % IN WATER 50 %
25 SYRINGE (ML) INTRAVENOUS ONCE
Qty: 0 | Refills: 0 | Status: DISCONTINUED | OUTPATIENT
Start: 2019-02-20 | End: 2019-03-05

## 2019-02-20 RX ORDER — DEXTROSE 50 % IN WATER 50 %
12.5 SYRINGE (ML) INTRAVENOUS ONCE
Qty: 0 | Refills: 0 | Status: DISCONTINUED | OUTPATIENT
Start: 2019-02-20 | End: 2019-03-05

## 2019-02-20 RX ORDER — INSULIN LISPRO 100/ML
VIAL (ML) SUBCUTANEOUS
Qty: 0 | Refills: 0 | Status: DISCONTINUED | OUTPATIENT
Start: 2019-02-20 | End: 2019-03-05

## 2019-02-20 RX ORDER — DEXTROSE 50 % IN WATER 50 %
15 SYRINGE (ML) INTRAVENOUS ONCE
Qty: 0 | Refills: 0 | Status: DISCONTINUED | OUTPATIENT
Start: 2019-02-20 | End: 2019-03-05

## 2019-02-20 RX ORDER — SODIUM CHLORIDE 9 MG/ML
1000 INJECTION, SOLUTION INTRAVENOUS
Qty: 0 | Refills: 0 | Status: DISCONTINUED | OUTPATIENT
Start: 2019-02-20 | End: 2019-02-20

## 2019-02-20 RX ORDER — INSULIN LISPRO 100/ML
VIAL (ML) SUBCUTANEOUS EVERY 6 HOURS
Qty: 0 | Refills: 0 | Status: DISCONTINUED | OUTPATIENT
Start: 2019-02-20 | End: 2019-02-20

## 2019-02-20 RX ORDER — SODIUM CHLORIDE 9 MG/ML
1000 INJECTION, SOLUTION INTRAVENOUS
Qty: 0 | Refills: 0 | Status: DISCONTINUED | OUTPATIENT
Start: 2019-02-20 | End: 2019-03-05

## 2019-02-20 RX ORDER — GLUCAGON INJECTION, SOLUTION 0.5 MG/.1ML
1 INJECTION, SOLUTION SUBCUTANEOUS ONCE
Qty: 0 | Refills: 0 | Status: DISCONTINUED | OUTPATIENT
Start: 2019-02-20 | End: 2019-03-05

## 2019-02-20 RX ORDER — INSULIN LISPRO 100/ML
VIAL (ML) SUBCUTANEOUS AT BEDTIME
Qty: 0 | Refills: 0 | Status: DISCONTINUED | OUTPATIENT
Start: 2019-02-20 | End: 2019-03-05

## 2019-02-20 RX ADMIN — GABAPENTIN 100 MILLIGRAM(S): 400 CAPSULE ORAL at 05:41

## 2019-02-20 RX ADMIN — PANTOPRAZOLE SODIUM 40 MILLIGRAM(S): 20 TABLET, DELAYED RELEASE ORAL at 13:01

## 2019-02-20 RX ADMIN — LINEZOLID 300 MILLIGRAM(S): 600 INJECTION, SOLUTION INTRAVENOUS at 06:18

## 2019-02-20 RX ADMIN — LINEZOLID 300 MILLIGRAM(S): 600 INJECTION, SOLUTION INTRAVENOUS at 18:06

## 2019-02-20 RX ADMIN — GABAPENTIN 100 MILLIGRAM(S): 400 CAPSULE ORAL at 22:34

## 2019-02-20 RX ADMIN — GABAPENTIN 100 MILLIGRAM(S): 400 CAPSULE ORAL at 13:00

## 2019-02-20 RX ADMIN — OCTREOTIDE ACETATE 100 MICROGRAM(S): 200 INJECTION, SOLUTION INTRAVENOUS; SUBCUTANEOUS at 22:35

## 2019-02-20 RX ADMIN — MEROPENEM 100 MILLIGRAM(S): 1 INJECTION INTRAVENOUS at 22:34

## 2019-02-20 RX ADMIN — SODIUM CHLORIDE 100 MILLILITER(S): 9 INJECTION, SOLUTION INTRAVENOUS at 07:57

## 2019-02-20 RX ADMIN — ATORVASTATIN CALCIUM 10 MILLIGRAM(S): 80 TABLET, FILM COATED ORAL at 22:34

## 2019-02-20 RX ADMIN — OCTREOTIDE ACETATE 100 MICROGRAM(S): 200 INJECTION, SOLUTION INTRAVENOUS; SUBCUTANEOUS at 13:01

## 2019-02-20 RX ADMIN — ENOXAPARIN SODIUM 40 MILLIGRAM(S): 100 INJECTION SUBCUTANEOUS at 13:00

## 2019-02-20 RX ADMIN — MEROPENEM 100 MILLIGRAM(S): 1 INJECTION INTRAVENOUS at 13:00

## 2019-02-20 RX ADMIN — Medication 1 DROP(S): at 05:41

## 2019-02-20 RX ADMIN — OCTREOTIDE ACETATE 100 MICROGRAM(S): 200 INJECTION, SOLUTION INTRAVENOUS; SUBCUTANEOUS at 05:40

## 2019-02-20 RX ADMIN — MONTELUKAST 10 MILLIGRAM(S): 4 TABLET, CHEWABLE ORAL at 13:01

## 2019-02-20 RX ADMIN — MEROPENEM 100 MILLIGRAM(S): 1 INJECTION INTRAVENOUS at 05:41

## 2019-02-20 RX ADMIN — Medication 2: at 13:01

## 2019-02-20 NOTE — PROGRESS NOTE ADULT - ASSESSMENT
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  Fever, leukocytosis  Prolonged hospital course with cholangitis; Whipples on 1/11; GIB; abd closure with mesh  Then found to have to have multiple abdominal collections  2/4 abd cultures neg  2/12 abd cultures now with E coli, Enterobacter, Enterococcus (VRE)  CT A/P with multiple abdominal collections  Concern that patient may need further source control to multiple abd collections  Seems improving--no fevers, WBC improving  Uncertain contribution VRE to infection, but treat for now; clinically appears improved  Overall, fever, leukocytosis, abd abscess, post operative state, positive culture finding  - Meropenem 1g q 8  - Zyvox 600mg q 12  - F/U CT read  - ? role for OR washout with multiple abd collections--defer to surgical team    Manny Olivera MD  Pager [phone]  After 5pm and on weekends call [phone]

## 2019-02-20 NOTE — PROGRESS NOTE ADULT - ASSESSMENT
54y Female PMHx HTN, T2DM, asthma, depression with recent diagnosis of invasive ampullary adenocarcinoma of the CBD admitted for cholangitis on 1/5, s/p ERCP 1/8, whipple 1/11, rapid response for GIB on floor 1/18 s/p MTP, RTOR s/p celiotomy, evacuation of hematoma, GDA bleed stopped and sutured, Abthera vac. Closure of abdomen with strattice mesh on 1/21, with inna drains and left lower JOURDAN drain in subcutaneous. Incisional bleed 1/23, bedside skin staples removed, JOURDAN drain removed, hemorrhage controlled, for continuation of ICU care. S/p interventional radiology drainage of LUQ collection 2/4    PLAN:  - tube check today  - f/u plastics -  s/p completing closure today w/ placing ostomy bag over fistula 2/18  - regular diet after procedure  - continue calorie count  - pain control as needed  - cont meropenem, linezolid per ID   - c/w octreotide  - PT/oob  - lovenox for DVT ppx    D Team Surgery

## 2019-02-20 NOTE — CHART NOTE - NSCHARTNOTEFT_GEN_A_CORE
Pre-Interventional Radiology Procedure Note    54y    Female    Procedure: tube check    Diagnosis/Indication: Patient is a 54y old  Female who presents with a chief complaint of cholangitis (19 Feb 2019 14:18)      Interventional Radiology Attending Physician:    Ordering Attending Physician: Steffen    PAST MEDICAL & SURGICAL HISTORY:  Adenocarcinoma of gallbladder  Type 2 diabetes mellitus without complication, without long-term current use of insulin  Neuropathy  Arthritis  Mild intermittent asthma without complication  Gastroesophageal reflux disease without esophagitis  Essential hypertension  No significant past surgical history       CBC Full  -  ( 20 Feb 2019 05:50 )  WBC Count : 11.77 K/uL  Hemoglobin : 9.0 g/dL  Hematocrit : 28.6 %  Platelet Count - Automated : 403 K/uL  Mean Cell Volume : 89.4 fL  Mean Cell Hemoglobin : 28.1 pg  Mean Cell Hemoglobin Concentration : 31.5 %  Auto Neutrophil # : x  Auto Lymphocyte # : x  Auto Monocyte # : x  Auto Eosinophil # : x  Auto Basophil # : x  Auto Neutrophil % : x  Auto Lymphocyte % : x  Auto Monocyte % : x  Auto Eosinophil % : x  Auto Basophil % : x    02-20    132<L>  |  97<L>  |  8   ----------------------------<  134<H>  4.3   |  25  |  0.50    Ca    8.4      20 Feb 2019 05:50  Phos  2.7     02-20  Mg     1.9     02-20    TPro  6.6  /  Alb  2.4<L>  /  TBili  1.5<H>  /  DBili  x   /  AST  65<H>  /  ALT  36<H>  /  AlkPhos  1082<H>  02-19    PT/INR - ( 20 Feb 2019 05:50 )   PT: 14.9 SEC;   INR: 1.30          PTT - ( 20 Feb 2019 05:50 )  PTT:32.0 SEC

## 2019-02-20 NOTE — PROGRESS NOTE ADULT - SUBJECTIVE AND OBJECTIVE BOX
FUNCTIONAL PROGRESS  Sit to Stand Transfer- Min Assist x 1  Gait - 15' with RW - Min Assist x 1      REVIEW OF SYSTEMS  Constitutional - No fever,  No fatigue  HEENT - No vertigo, No neck pain  Neurological - No headaches, No memory loss, No loss of strength, No numbness, No tremors  Skin - No rashes, No lesions   Musculoskeletal - No joint pain, No joint swelling, No muscle pain  Psychiatric - No depression, No anxiety      MEDICATIONS   acetaminophen    Suspension .. 650 milliGRAM(s) every 6 hours  artificial  tears Solution 1 Drop(s) every 12 hours  atorvastatin 10 milliGRAM(s) at bedtime  dextrose 40% Gel 15 Gram(s) once PRN  dextrose 5% + sodium chloride 0.9%. 1000 milliLiter(s) <Continuous>  dextrose 5%. 1000 milliLiter(s) <Continuous>  dextrose 50% Injectable 12.5 Gram(s) once  dextrose 50% Injectable 25 Gram(s) once  dextrose 50% Injectable 25 Gram(s) once  enoxaparin Injectable 40 milliGRAM(s) daily  gabapentin 100 milliGRAM(s) three times a day  glucagon  Injectable 1 milliGRAM(s) once PRN  insulin lispro (HumaLOG) corrective regimen sliding scale   three times a day before meals  insulin lispro (HumaLOG) corrective regimen sliding scale   at bedtime  lidocaine 1% Injectable 30 milliLiter(s) once  linezolid  IVPB 600 milliGRAM(s) every 12 hours  meropenem  IVPB 1000 milliGRAM(s) every 8 hours  montelukast 10 milliGRAM(s) daily  octreotide  Injectable 100 MICROGram(s) every 8 hours  oxyCODONE    Solution 5 milliGRAM(s) every 4 hours PRN  pantoprazole   Suspension 40 milliGRAM(s) daily      RECENT LABS/IMAGING  CBC Full  -  ( 2019 05:50 )  WBC Count : 11.77 K/uL  Hemoglobin : 9.0 g/dL  Hematocrit : 28.6 %  Platelet Count - Automated : 403 K/uL  Mean Cell Volume : 89.4 fL  Mean Cell Hemoglobin : 28.1 pg  Mean Cell Hemoglobin Concentration : 31.5 %  Auto Neutrophil # : x  Auto Lymphocyte # : x  Auto Monocyte # : x  Auto Eosinophil # : x  Auto Basophil # : x  Auto Neutrophil % : x  Auto Lymphocyte % : x  Auto Monocyte % : x  Auto Eosinophil % : x  Auto Basophil % : x    02-20    132<L>  |  97<L>  |  8   ----------------------------<  134<H>  4.3   |  25  |  0.50    Ca    8.4      2019 05:50  Phos  2.7       Mg     1.9         TPro  6.6  /  Alb  2.4<L>  /  TBili  1.5<H>  /  DBili  x   /  AST  65<H>  /  ALT  36<H>  /  AlkPhos  1082<H>      Urinalysis Basic - ( 2019 14:29 )    Color: YELLOW / Appearance: CLEAR / S.015 / pH: 7.5  Gluc: NEGATIVE / Ketone: NEGATIVE  / Bili: NEGATIVE / Urobili: NORMAL   Blood: SMALL / Protein: 30 / Nitrite: NEGATIVE   Leuk Esterase: NEGATIVE / RBC: 6-10 / WBC 6-10   Sq Epi: FEW / Non Sq Epi: x / Bacteria: SMALL    VITALS  T(C): 36.8 (19 @ 12:05), Max: 37.1 (19 @ 05:30)  HR: 86 (19 @ 12:05) (74 - 86)  BP: 136/66 (19 @ 12:05) (126/62 - 141/57)  RR: 17 (19 @ 12:05) (17 - 18)  SpO2: 97% (19 @ 12:05) (96% - 100%)  Wt(kg): --    ---------  PHYSICAL EXAM  Constitutional - NAD, Comfortable  Extremities - No C/C/E, No calf tenderness  Neurologic Exam -                    Cognitive - AAOx3     Communication - Fluent     Motor - No focal deficits     Sensory - Intact to LT  Psychiatric - Mood WNL, Affect WNL    ASSESSMENT/PLAN  54y Female with functional deficits    Pain - Tylenol, Gabapentin, Oxycodone  DVT PPX - Lovenox  Diet - CC  Rehab Dispo:  Recommend ACUTE inpatient rehabilitation for the functional deficits consisting of 3 hours of therapy/day & 24 hour RN/daily PMR physician for comorbid medical management. Will continue to follow for ongoing rehab needs and recommendations. 54y Female PMHx HTN, T2DM, asthma, depression with recent diagnosis of invasive ampullary adenocarcinoma of the CBD admitted for cholangitis on , s/p ERCP , whipple , rapid response for GIB on floor  s/p MTP, RTOR s/p celiotomy, evacuation of hematoma, GDA bleed stopped and sutured, Abthera vac. Closure of abdomen with strattice mesh on , with inna drains and left lower JOURDAN drain in subcutaneous. Incisional bleed , bedside skin staples removed, JOURDAN drain removed, hemorrhage controlled, for continuation of ICU care. S/p interventional radiology drainage of LUQ collection .     Interval: abdominal wound vac removed ; tolerating PO diet and nocturnal feeds. Planned for tube check with IR today.    Patient seen bedside with daughter present.  Patient states was able to walk with PT yesterday, notes some fatigue and generalized weakness of lower extremities.  Denies significant pain or numbness.      FUNCTIONAL PROGRESS  Sit to Stand Transfer- Min Assist x 1  Gait - 15' with RW - Min Assist x 1      REVIEW OF SYSTEMS  Constitutional - No fever,  No fatigue  HEENT - No vertigo, No neck pain  Neurological - No headaches, No memory loss, No loss of strength, No numbness, No tremors  Skin - No rashes, No lesions   Musculoskeletal - No joint pain, No joint swelling, No muscle pain  Psychiatric - No depression, No anxiety      MEDICATIONS   acetaminophen    Suspension .. 650 milliGRAM(s) every 6 hours  artificial  tears Solution 1 Drop(s) every 12 hours  atorvastatin 10 milliGRAM(s) at bedtime  dextrose 40% Gel 15 Gram(s) once PRN  dextrose 5% + sodium chloride 0.9%. 1000 milliLiter(s) <Continuous>  dextrose 5%. 1000 milliLiter(s) <Continuous>  dextrose 50% Injectable 12.5 Gram(s) once  dextrose 50% Injectable 25 Gram(s) once  dextrose 50% Injectable 25 Gram(s) once  enoxaparin Injectable 40 milliGRAM(s) daily  gabapentin 100 milliGRAM(s) three times a day  glucagon  Injectable 1 milliGRAM(s) once PRN  insulin lispro (HumaLOG) corrective regimen sliding scale   three times a day before meals  insulin lispro (HumaLOG) corrective regimen sliding scale   at bedtime  lidocaine 1% Injectable 30 milliLiter(s) once  linezolid  IVPB 600 milliGRAM(s) every 12 hours  meropenem  IVPB 1000 milliGRAM(s) every 8 hours  montelukast 10 milliGRAM(s) daily  octreotide  Injectable 100 MICROGram(s) every 8 hours  oxyCODONE    Solution 5 milliGRAM(s) every 4 hours PRN  pantoprazole   Suspension 40 milliGRAM(s) daily      RECENT LABS/IMAGING  CBC Full  -  ( 2019 05:50 )  WBC Count : 11.77 K/uL  Hemoglobin : 9.0 g/dL  Hematocrit : 28.6 %  Platelet Count - Automated : 403 K/uL  Mean Cell Volume : 89.4 fL  Mean Cell Hemoglobin : 28.1 pg  Mean Cell Hemoglobin Concentration : 31.5 %  Auto Neutrophil # : x  Auto Lymphocyte # : x  Auto Monocyte # : x  Auto Eosinophil # : x  Auto Basophil # : x  Auto Neutrophil % : x  Auto Lymphocyte % : x  Auto Monocyte % : x  Auto Eosinophil % : x  Auto Basophil % : x        132<L>  |  97<L>  |  8   ----------------------------<  134<H>  4.3   |  25  |  0.50    Ca    8.4      2019 05:50  Phos  2.7       Mg     1.9         TPro  6.6  /  Alb  2.4<L>  /  TBili  1.5<H>  /  DBili  x   /  AST  65<H>  /  ALT  36<H>  /  AlkPhos  1082<H>      Urinalysis Basic - ( 2019 14:29 )    Color: YELLOW / Appearance: CLEAR / S.015 / pH: 7.5  Gluc: NEGATIVE / Ketone: NEGATIVE  / Bili: NEGATIVE / Urobili: NORMAL   Blood: SMALL / Protein: 30 / Nitrite: NEGATIVE   Leuk Esterase: NEGATIVE / RBC: 6-10 / WBC 6-10   Sq Epi: FEW / Non Sq Epi: x / Bacteria: SMALL    VITALS  T(C): 36.8 (19 @ 12:05), Max: 37.1 (19 @ 05:30)  HR: 86 (19 @ 12:05) (74 - 86)  BP: 136/66 (19 @ 12:05) (126/62 - 141/57)  RR: 17 (19 @ 12:05) (17 - 18)  SpO2: 97% (19 @ 12:05) (96% - 100%)  Wt(kg): --    ---------  PHYSICAL EXAM  Constitutional - NAD, Comfortable  Extremities - No C/C/E, No calf tenderness  Neurologic Exam -                    Cognitive - AAOx3     Communication - Fluent     Motor - 4/5 throughout      Sensory - Intact to LT  Psychiatric - Mood WNL, Affect WNL    ASSESSMENT/PLAN  53 y/o F admitted for cholangitis, s/p ERCP , whipple  c/b GIB on floor  s/p MTP, RTOR s/p celiotomy, evacuation of hematoma now with severe deconditioning, functional, ADL, and gait impairments     Pain - Tylenol, Gabapentin, Oxycodone  DVT PPX - Lovenox  Diet - CC  Rehab Dispo:  Recommend ACUTE inpatient rehabilitation for the functional deficits consisting of 3 hours of therapy/day & 24 hour RN/daily PMR physician for comorbid medical management. Will continue to follow for ongoing rehab needs and recommendations.

## 2019-02-20 NOTE — PROGRESS NOTE ADULT - SUBJECTIVE AND OBJECTIVE BOX
GENERAL SURGERY DAILY PROGRESS NOTE:     Subjective:  Pt seen and examined. No acute events overnight. Tube check w/ IR today. pain well controlled. Dressing holding.     Objective:  General: Appears well, NAD  Chest: Equal expansion bilaterally  Abdomen soft, nontender, nondistended, incision C/D/I with ostomy appliance around midline wound w/ minimal output, R - IR w/ milky output; L -IR x2 w/ SS output  Extremities: Grossly symmetric, SCD's in place     MEDICATIONS  (STANDING):  acetaminophen    Suspension .. 650 milliGRAM(s) Oral every 6 hours  artificial  tears Solution 1 Drop(s) Both EYES every 12 hours  atorvastatin 10 milliGRAM(s) Oral at bedtime  dextrose 5% + sodium chloride 0.9%. 1000 milliLiter(s) (100 mL/Hr) IV Continuous <Continuous>  enoxaparin Injectable 40 milliGRAM(s) SubCutaneous daily  gabapentin 100 milliGRAM(s) Oral three times a day  insulin lispro (HumaLOG) corrective regimen sliding scale   SubCutaneous at bedtime  insulin lispro (HumaLOG) corrective regimen sliding scale   SubCutaneous every 6 hours  lidocaine 1% Injectable 30 milliLiter(s) Local Injection once  linezolid  IVPB 600 milliGRAM(s) IV Intermittent every 12 hours  meropenem  IVPB 1000 milliGRAM(s) IV Intermittent every 8 hours  montelukast 10 milliGRAM(s) Oral daily  octreotide  Injectable 100 MICROGram(s) SubCutaneous every 8 hours  pantoprazole   Suspension 40 milliGRAM(s) Oral daily    MEDICATIONS  (PRN):  oxyCODONE    Solution 5 milliGRAM(s) Oral every 4 hours PRN moderate and severe pain      Vital Signs Last 24 Hrs  T(C): 36.8 (20 Feb 2019 07:46), Max: 37.1 (20 Feb 2019 05:30)  T(F): 98.3 (20 Feb 2019 07:46), Max: 98.7 (20 Feb 2019 05:30)  HR: 81 (20 Feb 2019 07:46) (74 - 89)  BP: 136/63 (20 Feb 2019 07:46) (126/62 - 148/60)  BP(mean): --  RR: 17 (20 Feb 2019 07:46) (17 - 18)  SpO2: 96% (20 Feb 2019 07:46) (96% - 100%)    I&O's Detail    19 Feb 2019 07:01  -  20 Feb 2019 07:00  --------------------------------------------------------  IN:  Total IN: 0 mL    OUT:    Bulb: 4 mL    Bulb: 17 mL    Drain: 100 mL    Voided: 1950 mL  Total OUT: 2071 mL    Total NET: -2071 mL          Daily     Daily     LABS:                        9.0    11.77 )-----------( 403      ( 20 Feb 2019 05:50 )             28.6     02-20    132<L>  |  97<L>  |  8   ----------------------------<  134<H>  4.3   |  25  |  0.50    Ca    8.4      20 Feb 2019 05:50  Phos  2.7     02-20  Mg     1.9     02-20    TPro  6.6  /  Alb  2.4<L>  /  TBili  1.5<H>  /  DBili  x   /  AST  65<H>  /  ALT  36<H>  /  AlkPhos  1082<H>  02-19    PT/INR - ( 20 Feb 2019 05:50 )   PT: 14.9 SEC;   INR: 1.30          PTT - ( 20 Feb 2019 05:50 )  PTT:32.0 SEC      RADIOLOGY & ADDITIONAL STUDIES:

## 2019-02-20 NOTE — PROGRESS NOTE ADULT - SUBJECTIVE AND OBJECTIVE BOX
CC: F/U for Abscesses    Saw/spoke to patient. Patient unchanged. No new complaints. No new fevers/chills.    Allergies  No Known Allergies    ANTIMICROBIALS:  linezolid  IVPB 600 every 12 hours  meropenem  IVPB 1000 every 8 hours    PE:    Vital Signs Last 24 Hrs  T(C): 36.8 (20 Feb 2019 12:05), Max: 37.1 (20 Feb 2019 05:30)  T(F): 98.2 (20 Feb 2019 12:05), Max: 98.7 (20 Feb 2019 05:30)  HR: 86 (20 Feb 2019 12:05) (74 - 89)  BP: 136/66 (20 Feb 2019 12:05) (126/62 - 148/60)  RR: 17 (20 Feb 2019 12:05) (17 - 18)  SpO2: 97% (20 Feb 2019 12:05) (96% - 100%)    Gen: AOx3, NAD, non-toxic, pleasant  CV: S1+S2 normal, nontachycardic  Resp: Clear bilat, no resp distress, no crackles/wheezes  Abd: Soft, nontender, +BS, drains in place; unchanged  Ext: No LE edema, no wounds    LABS:                        9.0    11.77 )-----------( 403      ( 20 Feb 2019 05:50 )             28.6     02-20    132<L>  |  97<L>  |  8   ----------------------------<  134<H>  4.3   |  25  |  0.50    Ca    8.4      20 Feb 2019 05:50  Phos  2.7     02-20  Mg     1.9     02-20    TPro  6.6  /  Alb  2.4<L>  /  TBili  1.5<H>  /  DBili  x   /  AST  65<H>  /  ALT  36<H>  /  AlkPhos  1082<H>  02-19    MICROBIOLOGY:    ABSCESS  02-12-19 --  --  Escherichia coli  Enterococcus faecalis VRE    ABSCESS  02-12-19 --  --  Escherichia coli  Enterobacter cloacae    (otherwise reviewed)    RADIOLOGY:    2/19 CT:    ******PRELIMINARY REPORT******          INTERPRETATION:  Nonobstructive bowel gas pattern. Moderate amount stool   throughout the colon.

## 2019-02-21 LAB
ANION GAP SERPL CALC-SCNC: 10 MMO/L — SIGNIFICANT CHANGE UP (ref 7–14)
BUN SERPL-MCNC: 7 MG/DL — SIGNIFICANT CHANGE UP (ref 7–23)
CALCIUM SERPL-MCNC: 8.3 MG/DL — LOW (ref 8.4–10.5)
CHLORIDE SERPL-SCNC: 94 MMOL/L — LOW (ref 98–107)
CO2 SERPL-SCNC: 26 MMOL/L — SIGNIFICANT CHANGE UP (ref 22–31)
CREAT SERPL-MCNC: 0.53 MG/DL — SIGNIFICANT CHANGE UP (ref 0.5–1.3)
GLUCOSE SERPL-MCNC: 138 MG/DL — HIGH (ref 70–99)
HCT VFR BLD CALC: 26.9 % — LOW (ref 34.5–45)
HGB BLD-MCNC: 8.6 G/DL — LOW (ref 11.5–15.5)
MAGNESIUM SERPL-MCNC: 1.8 MG/DL — SIGNIFICANT CHANGE UP (ref 1.6–2.6)
MCHC RBC-ENTMCNC: 28.1 PG — SIGNIFICANT CHANGE UP (ref 27–34)
MCHC RBC-ENTMCNC: 32 % — SIGNIFICANT CHANGE UP (ref 32–36)
MCV RBC AUTO: 87.9 FL — SIGNIFICANT CHANGE UP (ref 80–100)
NRBC # FLD: 0 K/UL — LOW (ref 25–125)
PHOSPHATE SERPL-MCNC: 2.3 MG/DL — LOW (ref 2.5–4.5)
PLATELET # BLD AUTO: 365 K/UL — SIGNIFICANT CHANGE UP (ref 150–400)
PMV BLD: 9.7 FL — SIGNIFICANT CHANGE UP (ref 7–13)
POTASSIUM SERPL-MCNC: 3.9 MMOL/L — SIGNIFICANT CHANGE UP (ref 3.5–5.3)
POTASSIUM SERPL-SCNC: 3.9 MMOL/L — SIGNIFICANT CHANGE UP (ref 3.5–5.3)
RBC # BLD: 3.06 M/UL — LOW (ref 3.8–5.2)
RBC # FLD: 17.2 % — HIGH (ref 10.3–14.5)
SODIUM SERPL-SCNC: 130 MMOL/L — LOW (ref 135–145)
WBC # BLD: 13.04 K/UL — HIGH (ref 3.8–10.5)
WBC # FLD AUTO: 13.04 K/UL — HIGH (ref 3.8–10.5)

## 2019-02-21 PROCEDURE — 99232 SBSQ HOSP IP/OBS MODERATE 35: CPT

## 2019-02-21 PROCEDURE — 71045 X-RAY EXAM CHEST 1 VIEW: CPT | Mod: 26

## 2019-02-21 RX ORDER — SODIUM,POTASSIUM PHOSPHATES 278-250MG
1 POWDER IN PACKET (EA) ORAL ONCE
Qty: 0 | Refills: 0 | Status: COMPLETED | OUTPATIENT
Start: 2019-02-21 | End: 2019-02-21

## 2019-02-21 RX ORDER — MAGNESIUM SULFATE 500 MG/ML
2 VIAL (ML) INJECTION ONCE
Qty: 0 | Refills: 0 | Status: COMPLETED | OUTPATIENT
Start: 2019-02-21 | End: 2019-02-21

## 2019-02-21 RX ORDER — OXYCODONE HYDROCHLORIDE 5 MG/1
5 TABLET ORAL EVERY 4 HOURS
Qty: 0 | Refills: 0 | Status: DISCONTINUED | OUTPATIENT
Start: 2019-02-21 | End: 2019-02-22

## 2019-02-21 RX ADMIN — OCTREOTIDE ACETATE 100 MICROGRAM(S): 200 INJECTION, SOLUTION INTRAVENOUS; SUBCUTANEOUS at 12:47

## 2019-02-21 RX ADMIN — MEROPENEM 100 MILLIGRAM(S): 1 INJECTION INTRAVENOUS at 14:00

## 2019-02-21 RX ADMIN — ATORVASTATIN CALCIUM 10 MILLIGRAM(S): 80 TABLET, FILM COATED ORAL at 22:13

## 2019-02-21 RX ADMIN — Medication 650 MILLIGRAM(S): at 05:32

## 2019-02-21 RX ADMIN — GABAPENTIN 100 MILLIGRAM(S): 400 CAPSULE ORAL at 05:26

## 2019-02-21 RX ADMIN — Medication 1 PACKET(S): at 17:05

## 2019-02-21 RX ADMIN — Medication 2: at 09:11

## 2019-02-21 RX ADMIN — MEROPENEM 100 MILLIGRAM(S): 1 INJECTION INTRAVENOUS at 22:13

## 2019-02-21 RX ADMIN — OCTREOTIDE ACETATE 100 MICROGRAM(S): 200 INJECTION, SOLUTION INTRAVENOUS; SUBCUTANEOUS at 05:26

## 2019-02-21 RX ADMIN — ENOXAPARIN SODIUM 40 MILLIGRAM(S): 100 INJECTION SUBCUTANEOUS at 12:45

## 2019-02-21 RX ADMIN — OXYCODONE HYDROCHLORIDE 5 MILLIGRAM(S): 5 TABLET ORAL at 02:31

## 2019-02-21 RX ADMIN — Medication 650 MILLIGRAM(S): at 22:13

## 2019-02-21 RX ADMIN — LINEZOLID 300 MILLIGRAM(S): 600 INJECTION, SOLUTION INTRAVENOUS at 17:05

## 2019-02-21 RX ADMIN — OCTREOTIDE ACETATE 100 MICROGRAM(S): 200 INJECTION, SOLUTION INTRAVENOUS; SUBCUTANEOUS at 22:13

## 2019-02-21 RX ADMIN — GABAPENTIN 100 MILLIGRAM(S): 400 CAPSULE ORAL at 12:46

## 2019-02-21 RX ADMIN — MEROPENEM 100 MILLIGRAM(S): 1 INJECTION INTRAVENOUS at 05:26

## 2019-02-21 RX ADMIN — Medication 50 GRAM(S): at 12:44

## 2019-02-21 RX ADMIN — GABAPENTIN 100 MILLIGRAM(S): 400 CAPSULE ORAL at 22:13

## 2019-02-21 RX ADMIN — PANTOPRAZOLE SODIUM 40 MILLIGRAM(S): 20 TABLET, DELAYED RELEASE ORAL at 12:46

## 2019-02-21 RX ADMIN — MONTELUKAST 10 MILLIGRAM(S): 4 TABLET, CHEWABLE ORAL at 12:45

## 2019-02-21 RX ADMIN — Medication 4: at 18:34

## 2019-02-21 RX ADMIN — OXYCODONE HYDROCHLORIDE 5 MILLIGRAM(S): 5 TABLET ORAL at 03:20

## 2019-02-21 RX ADMIN — LINEZOLID 300 MILLIGRAM(S): 600 INJECTION, SOLUTION INTRAVENOUS at 06:04

## 2019-02-21 NOTE — PROGRESS NOTE ADULT - ASSESSMENT
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  Fever, leukocytosis  Prolonged hospital course with cholangitis; Whipples on 1/11; GIB; abd closure with mesh  Then found to have to have multiple abdominal collections  2/4 abd cultures neg  2/12 abd cultures now with E coli, Enterobacter, Enterococcus (VRE)  CT A/P with multiple abdominal collections  Concern that patient may need further source control to multiple abd collections  Seems improving--no fevers, WBC stable  S/p IR tube check 2/10  Uncertain contribution VRE to infection, but treat for now; clinically appears improved  Overall, fever, leukocytosis, abd abscess, post operative state, positive culture finding  - Meropenem 1g q 8  - Zyvox 600mg q 12  - F/U CT read--? still prelim  - ? role for OR washout with multiple abd collections--defer to surgical team  - Drains per IR    Manny Olivera MD  Pager [phone]  After 5pm and on weekends call [phone]

## 2019-02-21 NOTE — PROGRESS NOTE ADULT - ASSESSMENT
54y Female PMHx HTN, T2DM, asthma, depression with recent diagnosis of invasive ampullary adenocarcinoma of the CBD admitted for cholangitis on 1/5, s/p ERCP 1/8, whipple 1/11, rapid response for GIB on floor 1/18 s/p MTP, RTOR s/p celiotomy, evacuation of hematoma, GDA bleed stopped and sutured, Abthera vac. Closure of abdomen with strattice mesh on 1/21, with inna drains and left lower JOURDAN drain in subcutaneous. Incisional bleed 1/23, bedside skin staples removed, JOURDAN drain removed, hemorrhage controlled, for continuation of ICU care. S/p interventional radiology drainage of LUQ collection 2/4. S/p completing closure today w/ placing ostomy bag over fistula 2/18      PLAN:  - regular diet   - continue calorie count  - pain control as needed  - cont meropenem, linezolid per ID   - c/w octreotide  - PT/oob  - lovenox for DVT ppx    D Team Surgery

## 2019-02-21 NOTE — PROGRESS NOTE ADULT - SUBJECTIVE AND OBJECTIVE BOX
CC: F/U for Abscesses    Saw/spoke to patient. No fevers, no chills. Overall well. Reported SOB to RN prior--presently appears breathing comfortaby.    Allergies  No Known Allergies    ANTIMICROBIALS:  linezolid  IVPB 600 every 12 hours  meropenem  IVPB 1000 every 8 hours    PE:    Vital Signs Last 24 Hrs  T(C): 37.3 (2019 05:09), Max: 37.6 (2019 17:29)  T(F): 99.1 (2019 05:09), Max: 99.7 (2019 20:42)  HR: 77 (2019 08:29) (77 - 99)  BP: 122/63 (2019 08:29) (122/63 - 151/73)  RR: 20 (2019 08:29) (18 - 20)  SpO2: 99% (2019 08:29) (97% - 100%)    Gen: AOx3, NAD, non-toxic, pleasant  CV: S1+S2 normal, nontachycardic  Resp: Clear bilat, no resp distress, no crackles/wheezes  Abd: Soft, nontender, +BS, drains in place  Ext: No LE edema, no wounds    LABS:                        8.6    13.04 )-----------( 365      ( 2019 05:27 )             26.9     02-21    130<L>  |  94<L>  |  7   ----------------------------<  138<H>  3.9   |  26  |  0.53    Ca    8.3<L>      2019 05:27  Phos  2.3     02-21  Mg     1.8     -21    Urinalysis Basic - ( 2019 14:29 )    Color: YELLOW / Appearance: CLEAR / S.015 / pH: 7.5  Gluc: NEGATIVE / Ketone: NEGATIVE  / Bili: NEGATIVE / Urobili: NORMAL   Blood: SMALL / Protein: 30 / Nitrite: NEGATIVE   Leuk Esterase: NEGATIVE / RBC: 6-10 / WBC 6-10   Sq Epi: FEW / Non Sq Epi: x / Bacteria: SMALL    MICROBIOLOGY:    ABSCESS  19 --  --  Escherichia coli  Enterococcus faecalis VRE    ABSCESS  19 --  --  Escherichia coli  Enterobacter cloacae    (otherwise reviewed)    RADIOLOGY:     CXR:    INTERPRETATION:     Heart size cannot be accurately evaluated on this image. The mediastinum   and greg are unremarkable.  The right lung is clear.  There is continued left basilar and retrocardiac opacity with obscuration   of the left hemidiaphragm. The left upper and mid lung is clear.  No right pleural effusion is seen.  Surgical clips and a drain project over the abdomen.

## 2019-02-21 NOTE — PROGRESS NOTE ADULT - SUBJECTIVE AND OBJECTIVE BOX
SURGERY DAILY PROGRESS NOTE:       SUBJECTIVE/ROS: Patient examined at bedside. No acute events overnight. Tolerates diet w/o N/V. +BM/+Flatus. IR tube check done yesterday, has some residual pain, well controlled with pain meds.          MEDICATIONS  (STANDING):  acetaminophen    Suspension .. 650 milliGRAM(s) Oral every 6 hours  artificial  tears Solution 1 Drop(s) Both EYES every 12 hours  atorvastatin 10 milliGRAM(s) Oral at bedtime  dextrose 5%. 1000 milliLiter(s) (50 mL/Hr) IV Continuous <Continuous>  dextrose 50% Injectable 12.5 Gram(s) IV Push once  dextrose 50% Injectable 25 Gram(s) IV Push once  dextrose 50% Injectable 25 Gram(s) IV Push once  enoxaparin Injectable 40 milliGRAM(s) SubCutaneous daily  gabapentin 100 milliGRAM(s) Oral three times a day  insulin lispro (HumaLOG) corrective regimen sliding scale   SubCutaneous three times a day before meals  insulin lispro (HumaLOG) corrective regimen sliding scale   SubCutaneous at bedtime  lidocaine 1% Injectable 30 milliLiter(s) Local Injection once  linezolid  IVPB 600 milliGRAM(s) IV Intermittent every 12 hours  meropenem  IVPB 1000 milliGRAM(s) IV Intermittent every 8 hours  montelukast 10 milliGRAM(s) Oral daily  octreotide  Injectable 100 MICROGram(s) SubCutaneous every 8 hours  pantoprazole   Suspension 40 milliGRAM(s) Oral daily  potassium phosphate / sodium phosphate powder 1 Packet(s) Oral once    MEDICATIONS  (PRN):  dextrose 40% Gel 15 Gram(s) Oral once PRN Blood Glucose LESS THAN 70 milliGRAM(s)/deciliter  glucagon  Injectable 1 milliGRAM(s) IntraMuscular once PRN Glucose LESS THAN 70 milligrams/deciliter  oxyCODONE    Solution 5 milliGRAM(s) Oral every 4 hours PRN moderate and severe pain      OBJECTIVE:    Vital Signs Last 24 Hrs  T(C): 36.7 (2019 13:30), Max: 37.6 (2019 17:29)  T(F): 98 (2019 13:30), Max: 99.7 (2019 20:42)  HR: 77 (2019 13:30) (77 - 99)  BP: 122/87 (2019 13:30) (122/63 - 151/73)  BP(mean): --  RR: 20 (2019 13:30) (18 - 20)  SpO2: 100% (2019 13:30) (97% - 100%)        I&O's Detail    2019 07:01  -  2019 07:00  --------------------------------------------------------  IN:  Total IN: 0 mL    OUT:    Bulb: 10 mL    Drain: 140 mL    Voided: 1750 mL  Total OUT: 1900 mL    Total NET: -1900 mL      2019 07:01  -  2019 16:54  --------------------------------------------------------  IN:  Total IN: 0 mL    OUT:    Bulb: 5 mL    Bulb: 5 mL    Drain: 30 mL  Total OUT: 40 mL    Total NET: -40 mL          Daily     Daily     LABS:                        8.6    13.04 )-----------( 365      ( 2019 05:27 )             26.9     02-21    130<L>  |  94<L>  |  7   ----------------------------<  138<H>  3.9   |  26  |  0.53    Ca    8.3<L>      2019 05:27  Phos  2.3     02-21  Mg     1.8     02-21      PT/INR - ( 2019 05:50 )   PT: 14.9 SEC;   INR: 1.30          PTT - ( 2019 05:50 )  PTT:32.0 SEC  Urinalysis Basic - ( 2019 14:29 )    Color: YELLOW / Appearance: CLEAR / S.015 / pH: 7.5  Gluc: NEGATIVE / Ketone: NEGATIVE  / Bili: NEGATIVE / Urobili: NORMAL   Blood: SMALL / Protein: 30 / Nitrite: NEGATIVE   Leuk Esterase: NEGATIVE / RBC: 6-10 / WBC 6-10   Sq Epi: FEW / Non Sq Epi: x / Bacteria: SMALL                PHYSICAL EXAM:  General: Appears well, NAD  Chest: Equal expansion bilaterally  Abdomen soft, nontender, nondistended, incision C/D/I with ostomy appliance around midline wound w/ minimal output, R - IR w/ milky output; L -IR x2 w/ SS output  Extremities: Grossly symmetric, SCD's in place

## 2019-02-22 LAB
ANION GAP SERPL CALC-SCNC: 12 MMO/L — SIGNIFICANT CHANGE UP (ref 7–14)
APPEARANCE UR: CLEAR — SIGNIFICANT CHANGE UP
BACTERIA # UR AUTO: NEGATIVE — SIGNIFICANT CHANGE UP
BILIRUB UR-MCNC: NEGATIVE — SIGNIFICANT CHANGE UP
BLOOD UR QL VISUAL: NEGATIVE — SIGNIFICANT CHANGE UP
BUN SERPL-MCNC: 6 MG/DL — LOW (ref 7–23)
CALCIUM SERPL-MCNC: 8 MG/DL — LOW (ref 8.4–10.5)
CHLORIDE SERPL-SCNC: 96 MMOL/L — LOW (ref 98–107)
CO2 SERPL-SCNC: 27 MMOL/L — SIGNIFICANT CHANGE UP (ref 22–31)
COLOR SPEC: YELLOW — SIGNIFICANT CHANGE UP
CREAT SERPL-MCNC: 0.5 MG/DL — SIGNIFICANT CHANGE UP (ref 0.5–1.3)
GLUCOSE SERPL-MCNC: 160 MG/DL — HIGH (ref 70–99)
GLUCOSE UR-MCNC: NEGATIVE — SIGNIFICANT CHANGE UP
HCT VFR BLD CALC: 29 % — LOW (ref 34.5–45)
HGB BLD-MCNC: 9 G/DL — LOW (ref 11.5–15.5)
HYALINE CASTS # UR AUTO: SIGNIFICANT CHANGE UP
KETONES UR-MCNC: NEGATIVE — SIGNIFICANT CHANGE UP
LEUKOCYTE ESTERASE UR-ACNC: NEGATIVE — SIGNIFICANT CHANGE UP
MAGNESIUM SERPL-MCNC: 2.2 MG/DL — SIGNIFICANT CHANGE UP (ref 1.6–2.6)
MCHC RBC-ENTMCNC: 28 PG — SIGNIFICANT CHANGE UP (ref 27–34)
MCHC RBC-ENTMCNC: 31 % — LOW (ref 32–36)
MCV RBC AUTO: 90.1 FL — SIGNIFICANT CHANGE UP (ref 80–100)
NITRITE UR-MCNC: NEGATIVE — SIGNIFICANT CHANGE UP
NRBC # FLD: 0 K/UL — LOW (ref 25–125)
PH UR: 7 — SIGNIFICANT CHANGE UP (ref 5–8)
PHOSPHATE SERPL-MCNC: 2.8 MG/DL — SIGNIFICANT CHANGE UP (ref 2.5–4.5)
PLATELET # BLD AUTO: 325 K/UL — SIGNIFICANT CHANGE UP (ref 150–400)
PMV BLD: 9.6 FL — SIGNIFICANT CHANGE UP (ref 7–13)
POTASSIUM SERPL-MCNC: 3.7 MMOL/L — SIGNIFICANT CHANGE UP (ref 3.5–5.3)
POTASSIUM SERPL-SCNC: 3.7 MMOL/L — SIGNIFICANT CHANGE UP (ref 3.5–5.3)
PROT UR-MCNC: 70 — SIGNIFICANT CHANGE UP
RBC # BLD: 3.22 M/UL — LOW (ref 3.8–5.2)
RBC # FLD: 16.9 % — HIGH (ref 10.3–14.5)
RBC CASTS # UR COMP ASSIST: SIGNIFICANT CHANGE UP (ref 0–?)
SODIUM SERPL-SCNC: 135 MMOL/L — SIGNIFICANT CHANGE UP (ref 135–145)
SP GR SPEC: 1.02 — SIGNIFICANT CHANGE UP (ref 1–1.04)
SPECIMEN SOURCE: SIGNIFICANT CHANGE UP
SPECIMEN SOURCE: SIGNIFICANT CHANGE UP
SQUAMOUS # UR AUTO: SIGNIFICANT CHANGE UP
UROBILINOGEN FLD QL: SIGNIFICANT CHANGE UP
WBC # BLD: 10.38 K/UL — SIGNIFICANT CHANGE UP (ref 3.8–10.5)
WBC # FLD AUTO: 10.38 K/UL — SIGNIFICANT CHANGE UP (ref 3.8–10.5)
WBC UR QL: SIGNIFICANT CHANGE UP (ref 0–?)

## 2019-02-22 PROCEDURE — 99232 SBSQ HOSP IP/OBS MODERATE 35: CPT

## 2019-02-22 PROCEDURE — 93970 EXTREMITY STUDY: CPT | Mod: 26

## 2019-02-22 RX ORDER — OXYCODONE HYDROCHLORIDE 5 MG/1
5 TABLET ORAL EVERY 4 HOURS
Qty: 0 | Refills: 0 | Status: DISCONTINUED | OUTPATIENT
Start: 2019-02-22 | End: 2019-02-23

## 2019-02-22 RX ORDER — ACETAMINOPHEN 500 MG
650 TABLET ORAL EVERY 6 HOURS
Qty: 0 | Refills: 0 | Status: DISCONTINUED | OUTPATIENT
Start: 2019-02-22 | End: 2019-03-05

## 2019-02-22 RX ORDER — POTASSIUM CHLORIDE 20 MEQ
20 PACKET (EA) ORAL ONCE
Qty: 0 | Refills: 0 | Status: COMPLETED | OUTPATIENT
Start: 2019-02-22 | End: 2019-02-22

## 2019-02-22 RX ORDER — PANTOPRAZOLE SODIUM 20 MG/1
40 TABLET, DELAYED RELEASE ORAL
Qty: 0 | Refills: 0 | Status: DISCONTINUED | OUTPATIENT
Start: 2019-02-22 | End: 2019-03-05

## 2019-02-22 RX ADMIN — OCTREOTIDE ACETATE 100 MICROGRAM(S): 200 INJECTION, SOLUTION INTRAVENOUS; SUBCUTANEOUS at 05:56

## 2019-02-22 RX ADMIN — OCTREOTIDE ACETATE 100 MICROGRAM(S): 200 INJECTION, SOLUTION INTRAVENOUS; SUBCUTANEOUS at 21:58

## 2019-02-22 RX ADMIN — MEROPENEM 100 MILLIGRAM(S): 1 INJECTION INTRAVENOUS at 05:56

## 2019-02-22 RX ADMIN — Medication 2: at 09:27

## 2019-02-22 RX ADMIN — LINEZOLID 300 MILLIGRAM(S): 600 INJECTION, SOLUTION INTRAVENOUS at 06:33

## 2019-02-22 RX ADMIN — GABAPENTIN 100 MILLIGRAM(S): 400 CAPSULE ORAL at 13:49

## 2019-02-22 RX ADMIN — GABAPENTIN 100 MILLIGRAM(S): 400 CAPSULE ORAL at 21:59

## 2019-02-22 RX ADMIN — LINEZOLID 300 MILLIGRAM(S): 600 INJECTION, SOLUTION INTRAVENOUS at 17:34

## 2019-02-22 RX ADMIN — OXYCODONE HYDROCHLORIDE 5 MILLIGRAM(S): 5 TABLET ORAL at 16:30

## 2019-02-22 RX ADMIN — Medication 20 MILLIEQUIVALENT(S): at 13:49

## 2019-02-22 RX ADMIN — OXYCODONE HYDROCHLORIDE 5 MILLIGRAM(S): 5 TABLET ORAL at 06:37

## 2019-02-22 RX ADMIN — ENOXAPARIN SODIUM 40 MILLIGRAM(S): 100 INJECTION SUBCUTANEOUS at 13:49

## 2019-02-22 RX ADMIN — MONTELUKAST 10 MILLIGRAM(S): 4 TABLET, CHEWABLE ORAL at 13:49

## 2019-02-22 RX ADMIN — Medication 2: at 17:34

## 2019-02-22 RX ADMIN — MEROPENEM 100 MILLIGRAM(S): 1 INJECTION INTRAVENOUS at 21:58

## 2019-02-22 RX ADMIN — OXYCODONE HYDROCHLORIDE 5 MILLIGRAM(S): 5 TABLET ORAL at 05:55

## 2019-02-22 RX ADMIN — ATORVASTATIN CALCIUM 10 MILLIGRAM(S): 80 TABLET, FILM COATED ORAL at 21:58

## 2019-02-22 RX ADMIN — GABAPENTIN 100 MILLIGRAM(S): 400 CAPSULE ORAL at 05:56

## 2019-02-22 RX ADMIN — MEROPENEM 100 MILLIGRAM(S): 1 INJECTION INTRAVENOUS at 13:49

## 2019-02-22 NOTE — PROGRESS NOTE ADULT - ASSESSMENT
54y Female PMHx HTN, T2DM, asthma, depression with recent diagnosis of invasive ampullary adenocarcinoma of the CBD admitted for cholangitis on 1/5, s/p ERCP 1/8, whipple 1/11, rapid response for GIB on floor 1/18 s/p MTP, RTOR s/p celiotomy, evacuation of hematoma, GDA bleed stopped and sutured, Abthera vac. Closure of abdomen with strattice mesh on 1/21, with inna drains and left lower JOURDAN drain in subcutaneous. Incisional bleed 1/23, bedside skin staples removed, JOURDAN drain removed, hemorrhage controlled, for continuation of ICU care. S/p interventional radiology drainage of LUQ collection 2/4. S/p completing closure today w/ placing ostomy bag over fistula 2/18.       PLAN:  - R/o bl LE DVT - f/u US  - F/u Bcx and UA  - regular diet   - continue calorie count  - pain control as needed  - cont meropenem, linezolid per ID   - c/w octreotide  - PT/oob  - lovenox for DVT ppx    D Team Surgery

## 2019-02-22 NOTE — PROGRESS NOTE ADULT - ASSESSMENT
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  Fever, leukocytosis  Prolonged hospital course with cholangitis; Whipples on 1/11; GIB; abd closure with mesh  Then found to have to have multiple abdominal collections  2/4 abd cultures neg  2/12 abd cultures now with E coli, Enterobacter, Enterococcus (VRE)  CT A/P with multiple abdominal collections  Concern that patient may need further source control to multiple abd collections  Seems improving--no fevers, WBC stable  S/p IR tube check 2/10  Uncertain contribution VRE to infection, but treat for now  Still with fever, but leukocytosis improved; had episode SOB, however note that present abx would also treat bacterial pneumonia  Overall, fever, leukocytosis, abd abscess, post operative state, positive culture finding  - Meropenem 1g q 8  - Zyvox 600mg q 12  - ? role for OR washout with multiple abd collections--defer to surgical team  - Drains per IR  - If SOB worsening, consider CT Chest  - Trend WBC, monitor for further fever    Manny Olivera MD  Pager [phone]  After 5pm and on weekends call [phone]

## 2019-02-22 NOTE — PROGRESS NOTE ADULT - SUBJECTIVE AND OBJECTIVE BOX
SURGERY DAILY PROGRESS NOTE:       SUBJECTIVE/ROS: Patient examined at bedside. Spiked fever in the morning, BCx sent. Tolerated regular diet well. Gi fxn at baseline. Pain well controlled.          MEDICATIONS  (STANDING):  acetaminophen    Suspension .. 650 milliGRAM(s) Oral every 6 hours  artificial  tears Solution 1 Drop(s) Both EYES every 12 hours  atorvastatin 10 milliGRAM(s) Oral at bedtime  dextrose 5%. 1000 milliLiter(s) (50 mL/Hr) IV Continuous <Continuous>  dextrose 50% Injectable 12.5 Gram(s) IV Push once  dextrose 50% Injectable 25 Gram(s) IV Push once  dextrose 50% Injectable 25 Gram(s) IV Push once  enoxaparin Injectable 40 milliGRAM(s) SubCutaneous daily  gabapentin 100 milliGRAM(s) Oral three times a day  insulin lispro (HumaLOG) corrective regimen sliding scale   SubCutaneous three times a day before meals  insulin lispro (HumaLOG) corrective regimen sliding scale   SubCutaneous at bedtime  lidocaine 1% Injectable 30 milliLiter(s) Local Injection once  linezolid  IVPB 600 milliGRAM(s) IV Intermittent every 12 hours  meropenem  IVPB 1000 milliGRAM(s) IV Intermittent every 8 hours  montelukast 10 milliGRAM(s) Oral daily  octreotide  Injectable 100 MICROGram(s) SubCutaneous every 8 hours  pantoprazole   Suspension 40 milliGRAM(s) Oral daily  potassium chloride    Tablet ER 20 milliEquivalent(s) Oral once    MEDICATIONS  (PRN):  dextrose 40% Gel 15 Gram(s) Oral once PRN Blood Glucose LESS THAN 70 milliGRAM(s)/deciliter  glucagon  Injectable 1 milliGRAM(s) IntraMuscular once PRN Glucose LESS THAN 70 milligrams/deciliter  oxyCODONE    Solution 5 milliGRAM(s) Oral every 4 hours PRN moderate and severe pain      OBJECTIVE:    Vital Signs Last 24 Hrs  T(C): 37.5 (2019 09:25), Max: 38.7 (2019 20:41)  T(F): 99.5 (2019 09:25), Max: 101.6 (2019 20:41)  HR: 84 (2019 09:25) (77 - 99)  BP: 128/60 (2019 09:25) (108/54 - 152/69)  BP(mean): 77 (2019 09:25) (77 - 77)  RR: 20 (2019 09:25) (20 - 20)  SpO2: 98% (2019 09:25) (94% - 100%)        I&O's Detail    2019 07:01  -  2019 07:00  --------------------------------------------------------  IN:  Total IN: 0 mL    OUT:    Bulb: 5 mL    Bulb: 5 mL    Drain: 30 mL    Drain: 80 mL    Voided: 1450 mL  Total OUT: 1570 mL    Total NET: -1570 mL      2019 07:01  -  2019 11:56  --------------------------------------------------------  IN:    Oral Fluid: 240 mL  Total IN: 240 mL    OUT:  Total OUT: 0 mL    Total NET: 240 mL          Daily     Daily     LABS:                        9.0    10.38 )-----------( 325      ( 2019 06:41 )             29.0         135  |  96<L>  |  6<L>  ----------------------------<  160<H>  3.7   |  27  |  0.50    Ca    8.0<L>      2019 06:41  Phos  2.8       Mg     2.2             Urinalysis Basic - ( 2019 14:29 )    Color: YELLOW / Appearance: CLEAR / S.015 / pH: 7.5  Gluc: NEGATIVE / Ketone: NEGATIVE  / Bili: NEGATIVE / Urobili: NORMAL   Blood: SMALL / Protein: 30 / Nitrite: NEGATIVE   Leuk Esterase: NEGATIVE / RBC: 6-10 / WBC 6-10   Sq Epi: FEW / Non Sq Epi: x / Bacteria: SMALL            PHYSICAL EXAM:  General: Appears well, NAD  Chest: Equal expansion bilaterally  Abdomen soft, nontender, nondistended, incision C/D/I with ostomy appliance around midline wound w/ minimal output, R - IR w/ milky output; L -IR  w/ SS output, Gurinder w/ serous/milky output.   Extremities: Grossly symmetric, SCD's in place

## 2019-02-22 NOTE — PROGRESS NOTE ADULT - SUBJECTIVE AND OBJECTIVE BOX
CC: F/U for Fever    Saw/spoke to patient. Fever yesterday evening, no chills. SOB somewhat improved. No worsening abd pain. Otherwise unchanged.    Allergies  No Known Allergies    ANTIMICROBIALS:  linezolid  IVPB 600 every 12 hours  meropenem  IVPB 1000 every 8 hours    PE:    Vital Signs Last 24 Hrs  T(C): 37.5 (2019 09:25), Max: 38.7 (2019 20:41)  T(F): 99.5 (2019 09:25), Max: 101.6 (2019 20:41)  HR: 84 (2019 09:25) (77 - 99)  BP: 128/60 (2019 09:25) (108/54 - 152/69)  BP(mean): 77 (2019 09:25) (77 - 77)  RR: 20 (2019 09:25) (20 - 20)  SpO2: 98% (2019 09:25) (94% - 100%)    Gen: AOx3, NAD, non-toxic, pleasant  CV: S1+S2 normal, nontachycardic  Resp: Clear bilat, no resp distress, no crackles/wheezes  Abd: Soft, nontender, +BS  Ext: No LE edema, no wounds    LABS:                        9.0    10.38 )-----------( 325      ( 2019 06:41 )             29.0         135  |  96<L>  |  6<L>  ----------------------------<  160<H>  3.7   |  27  |  0.50    Ca    8.0<L>      2019 06:41  Phos  2.8       Mg     2.2         Urinalysis Basic - ( 2019 14:29 )    Color: YELLOW / Appearance: CLEAR / S.015 / pH: 7.5  Gluc: NEGATIVE / Ketone: NEGATIVE  / Bili: NEGATIVE / Urobili: NORMAL   Blood: SMALL / Protein: 30 / Nitrite: NEGATIVE   Leuk Esterase: NEGATIVE / RBC: 6-10 / WBC 6-10   Sq Epi: FEW / Non Sq Epi: x / Bacteria: SMALL    MICROBIOLOGY:    ABSCESS  19 --  --  Escherichia coli  Enterococcus faecalis VRE    ABSCESS  19 --  --  Escherichia coli  Enterobacter cloacae    (otherwise reviewed)    RADIOLOGY:     CXR:    INTERPRETATION:     Heart size cannot be accurately evaluated on this image. The mediastinum   and greg are unremarkable.  The right lung is clear.  There is continued left basilar and retrocardiac opacity with obscuration   of the left hemidiaphragm. The left upper and mid lung is clear.  No right pleural effusion is seen.  Surgical clips and a drain project over the abdomen.

## 2019-02-23 LAB
ANION GAP SERPL CALC-SCNC: 11 MMO/L — SIGNIFICANT CHANGE UP (ref 7–14)
BUN SERPL-MCNC: 5 MG/DL — LOW (ref 7–23)
CALCIUM SERPL-MCNC: 7.8 MG/DL — LOW (ref 8.4–10.5)
CHLORIDE SERPL-SCNC: 92 MMOL/L — LOW (ref 98–107)
CO2 SERPL-SCNC: 26 MMOL/L — SIGNIFICANT CHANGE UP (ref 22–31)
CREAT SERPL-MCNC: 0.48 MG/DL — LOW (ref 0.5–1.3)
GLUCOSE SERPL-MCNC: 204 MG/DL — HIGH (ref 70–99)
HCT VFR BLD CALC: 25 % — LOW (ref 34.5–45)
HGB BLD-MCNC: 7.9 G/DL — LOW (ref 11.5–15.5)
MAGNESIUM SERPL-MCNC: 1.8 MG/DL — SIGNIFICANT CHANGE UP (ref 1.6–2.6)
MCHC RBC-ENTMCNC: 28.1 PG — SIGNIFICANT CHANGE UP (ref 27–34)
MCHC RBC-ENTMCNC: 31.6 % — LOW (ref 32–36)
MCV RBC AUTO: 89 FL — SIGNIFICANT CHANGE UP (ref 80–100)
NRBC # FLD: 0 K/UL — LOW (ref 25–125)
PHOSPHATE SERPL-MCNC: 1.8 MG/DL — LOW (ref 2.5–4.5)
PLATELET # BLD AUTO: 302 K/UL — SIGNIFICANT CHANGE UP (ref 150–400)
PMV BLD: 9.9 FL — SIGNIFICANT CHANGE UP (ref 7–13)
POTASSIUM SERPL-MCNC: 4.2 MMOL/L — SIGNIFICANT CHANGE UP (ref 3.5–5.3)
POTASSIUM SERPL-SCNC: 4.2 MMOL/L — SIGNIFICANT CHANGE UP (ref 3.5–5.3)
RBC # BLD: 2.81 M/UL — LOW (ref 3.8–5.2)
RBC # FLD: 16.7 % — HIGH (ref 10.3–14.5)
SODIUM SERPL-SCNC: 129 MMOL/L — LOW (ref 135–145)
WBC # BLD: 12.39 K/UL — HIGH (ref 3.8–10.5)
WBC # FLD AUTO: 12.39 K/UL — HIGH (ref 3.8–10.5)

## 2019-02-23 RX ORDER — HYDROMORPHONE HYDROCHLORIDE 2 MG/ML
2 INJECTION INTRAMUSCULAR; INTRAVENOUS; SUBCUTANEOUS EVERY 4 HOURS
Qty: 0 | Refills: 0 | Status: DISCONTINUED | OUTPATIENT
Start: 2019-02-23 | End: 2019-02-25

## 2019-02-23 RX ORDER — MAGNESIUM SULFATE 500 MG/ML
2 VIAL (ML) INJECTION ONCE
Qty: 0 | Refills: 0 | Status: COMPLETED | OUTPATIENT
Start: 2019-02-23 | End: 2019-02-23

## 2019-02-23 RX ORDER — SODIUM,POTASSIUM PHOSPHATES 278-250MG
1 POWDER IN PACKET (EA) ORAL ONCE
Qty: 0 | Refills: 0 | Status: COMPLETED | OUTPATIENT
Start: 2019-02-23 | End: 2019-02-23

## 2019-02-23 RX ADMIN — Medication 1 PACKET(S): at 13:01

## 2019-02-23 RX ADMIN — MONTELUKAST 10 MILLIGRAM(S): 4 TABLET, CHEWABLE ORAL at 13:01

## 2019-02-23 RX ADMIN — GABAPENTIN 100 MILLIGRAM(S): 400 CAPSULE ORAL at 05:17

## 2019-02-23 RX ADMIN — OXYCODONE HYDROCHLORIDE 5 MILLIGRAM(S): 5 TABLET ORAL at 00:30

## 2019-02-23 RX ADMIN — Medication 50 GRAM(S): at 18:02

## 2019-02-23 RX ADMIN — ENOXAPARIN SODIUM 40 MILLIGRAM(S): 100 INJECTION SUBCUTANEOUS at 13:00

## 2019-02-23 RX ADMIN — Medication 85 MILLIMOLE(S): at 19:57

## 2019-02-23 RX ADMIN — MEROPENEM 100 MILLIGRAM(S): 1 INJECTION INTRAVENOUS at 13:00

## 2019-02-23 RX ADMIN — MEROPENEM 100 MILLIGRAM(S): 1 INJECTION INTRAVENOUS at 05:17

## 2019-02-23 RX ADMIN — ATORVASTATIN CALCIUM 10 MILLIGRAM(S): 80 TABLET, FILM COATED ORAL at 21:21

## 2019-02-23 RX ADMIN — OCTREOTIDE ACETATE 100 MICROGRAM(S): 200 INJECTION, SOLUTION INTRAVENOUS; SUBCUTANEOUS at 21:21

## 2019-02-23 RX ADMIN — Medication 1 DROP(S): at 05:18

## 2019-02-23 RX ADMIN — Medication 2: at 13:00

## 2019-02-23 RX ADMIN — OCTREOTIDE ACETATE 100 MICROGRAM(S): 200 INJECTION, SOLUTION INTRAVENOUS; SUBCUTANEOUS at 05:17

## 2019-02-23 RX ADMIN — HYDROMORPHONE HYDROCHLORIDE 2 MILLIGRAM(S): 2 INJECTION INTRAMUSCULAR; INTRAVENOUS; SUBCUTANEOUS at 20:14

## 2019-02-23 RX ADMIN — LINEZOLID 300 MILLIGRAM(S): 600 INJECTION, SOLUTION INTRAVENOUS at 05:17

## 2019-02-23 RX ADMIN — GABAPENTIN 100 MILLIGRAM(S): 400 CAPSULE ORAL at 21:21

## 2019-02-23 RX ADMIN — LINEZOLID 300 MILLIGRAM(S): 600 INJECTION, SOLUTION INTRAVENOUS at 18:02

## 2019-02-23 RX ADMIN — PANTOPRAZOLE SODIUM 40 MILLIGRAM(S): 20 TABLET, DELAYED RELEASE ORAL at 05:19

## 2019-02-23 RX ADMIN — HYDROMORPHONE HYDROCHLORIDE 2 MILLIGRAM(S): 2 INJECTION INTRAMUSCULAR; INTRAVENOUS; SUBCUTANEOUS at 21:12

## 2019-02-23 RX ADMIN — MEROPENEM 100 MILLIGRAM(S): 1 INJECTION INTRAVENOUS at 21:21

## 2019-02-23 RX ADMIN — GABAPENTIN 100 MILLIGRAM(S): 400 CAPSULE ORAL at 13:01

## 2019-02-23 RX ADMIN — Medication 2: at 09:24

## 2019-02-23 RX ADMIN — OXYCODONE HYDROCHLORIDE 5 MILLIGRAM(S): 5 TABLET ORAL at 01:20

## 2019-02-23 NOTE — PROGRESS NOTE ADULT - ASSESSMENT
54y Female PMHx HTN, T2DM, asthma, depression with recent diagnosis of invasive ampullary adenocarcinoma of the CBD admitted for cholangitis on 1/5, s/p ERCP 1/8, whipple 1/11, rapid response for GIB on floor 1/18 s/p MTP, RTOR s/p celiotomy, evacuation of hematoma, GDA bleed stopped and sutured, Abthera vac. Closure of abdomen with strattice mesh on 1/21, with inna drains and left lower JOURDAN drain in subcutaneous. Incisional bleed 1/23, bedside skin staples removed, JOURDAN drain removed, hemorrhage controlled, for continuation of ICU care. S/p interventional radiology drainage of LUQ collection 2/4. S/p completing closure today w/ placing ostomy bag over fistula 2/18.       PLAN:  - R/o bl LE DVT - f/u US  - F/u Bcx and UA  - regular diet   - continue calorie count  - pain control as needed  - cont meropenem, linezolid per ID   - c/w octreotide  - PT/oob  - lovenox for DVT ppx  Seen and discussed w/ Dr. Hearn    D team [id number]  D Team Surgery

## 2019-02-23 NOTE — PROGRESS NOTE ADULT - SUBJECTIVE AND OBJECTIVE BOX
GENERAL SURGERY DAILY PROGRESS NOTE:     Subjective:  Pt seen and examined. No acute events overnight. Tolerating diet. Plan for discharge early this week. Pain not well controlled on oxycodone and switched to oral dilaudid.     Objective:  General: Appears well, NAD  Chest: Equal expansion bilaterally  Abdomen soft, nontender, nondistended, incision C/D/I with ostomy appliance around midline wound w/ minimal output, R - IR w/ milky output; L -IR  w/ SS output, Gurinder w/ serous/milky output.   Extremities: Grossly symmetric, SCD's in place     MEDICATIONS  (STANDING):  artificial  tears Solution 1 Drop(s) Both EYES every 12 hours  atorvastatin 10 milliGRAM(s) Oral at bedtime  dextrose 5%. 1000 milliLiter(s) (50 mL/Hr) IV Continuous <Continuous>  dextrose 50% Injectable 12.5 Gram(s) IV Push once  dextrose 50% Injectable 25 Gram(s) IV Push once  dextrose 50% Injectable 25 Gram(s) IV Push once  enoxaparin Injectable 40 milliGRAM(s) SubCutaneous daily  gabapentin 100 milliGRAM(s) Oral three times a day  insulin lispro (HumaLOG) corrective regimen sliding scale   SubCutaneous three times a day before meals  insulin lispro (HumaLOG) corrective regimen sliding scale   SubCutaneous at bedtime  lidocaine 1% Injectable 30 milliLiter(s) Local Injection once  linezolid  IVPB 600 milliGRAM(s) IV Intermittent every 12 hours  magnesium sulfate  IVPB 2 Gram(s) IV Intermittent once  meropenem  IVPB 1000 milliGRAM(s) IV Intermittent every 8 hours  montelukast 10 milliGRAM(s) Oral daily  octreotide  Injectable 100 MICROGram(s) SubCutaneous every 8 hours  pantoprazole    Tablet 40 milliGRAM(s) Oral before breakfast  potassium phosphate / sodium phosphate powder 1 Packet(s) Oral once  sodium phosphate IVPB 30 milliMole(s) IV Intermittent once    MEDICATIONS  (PRN):  acetaminophen   Tablet .. 650 milliGRAM(s) Oral every 6 hours PRN Mild Pain (1 - 3)  dextrose 40% Gel 15 Gram(s) Oral once PRN Blood Glucose LESS THAN 70 milliGRAM(s)/deciliter  glucagon  Injectable 1 milliGRAM(s) IntraMuscular once PRN Glucose LESS THAN 70 milligrams/deciliter  HYDROmorphone   Tablet 2 milliGRAM(s) Oral every 4 hours PRN Moderate Pain (4 - 6)      Vital Signs Last 24 Hrs  T(C): 36.6 (2019 09:24), Max: 37.5 (2019 23:58)  T(F): 97.8 (2019 09:24), Max: 99.5 (2019 23:58)  HR: 91 (2019 09:24) (86 - 98)  BP: 128/58 (2019 09:24) (108/52 - 128/58)  BP(mean): 74 (2019 09:24) (74 - 74)  RR: 18 (2019 09:24) (18 - 20)  SpO2: 98% (2019 09:24) (96% - 100%)    I&O's Detail    2019 07:01  -  2019 07:00  --------------------------------------------------------  IN:    Oral Fluid: 720 mL  Total IN: 720 mL    OUT:    Bulb: 5 mL    Drain: 100 mL    Drain: 5 mL    Voided: 900 mL  Total OUT: 1010 mL    Total NET: -290 mL          Daily     Daily     LABS:                        7.9    12.39 )-----------( 302      ( 2019 06:36 )             25.0     02-23    129<L>  |  92<L>  |  5<L>  ----------------------------<  204<H>  4.2   |  26  |  0.48<L>    Ca    7.8<L>      2019 06:36  Phos  1.8     02-  Mg     1.8             Urinalysis Basic - ( 2019 17:45 )    Color: YELLOW / Appearance: CLEAR / S.020 / pH: 7.0  Gluc: NEGATIVE / Ketone: NEGATIVE  / Bili: NEGATIVE / Urobili: TRACE   Blood: NEGATIVE / Protein: 70 / Nitrite: NEGATIVE   Leuk Esterase: NEGATIVE / RBC: 3-5 / WBC 3-5   Sq Epi: FEW / Non Sq Epi: x / Bacteria: NEGATIVE        RADIOLOGY & ADDITIONAL STUDIES:

## 2019-02-24 LAB
ANION GAP SERPL CALC-SCNC: 10 MMO/L — SIGNIFICANT CHANGE UP (ref 7–14)
BASOPHILS # BLD AUTO: 0.08 K/UL — SIGNIFICANT CHANGE UP (ref 0–0.2)
BASOPHILS NFR BLD AUTO: 0.8 % — SIGNIFICANT CHANGE UP (ref 0–2)
BUN SERPL-MCNC: 4 MG/DL — LOW (ref 7–23)
CALCIUM SERPL-MCNC: 8 MG/DL — LOW (ref 8.4–10.5)
CHLORIDE SERPL-SCNC: 94 MMOL/L — LOW (ref 98–107)
CO2 SERPL-SCNC: 28 MMOL/L — SIGNIFICANT CHANGE UP (ref 22–31)
CREAT SERPL-MCNC: 0.47 MG/DL — LOW (ref 0.5–1.3)
EOSINOPHIL # BLD AUTO: 0.51 K/UL — HIGH (ref 0–0.5)
EOSINOPHIL NFR BLD AUTO: 4.8 % — SIGNIFICANT CHANGE UP (ref 0–6)
GLUCOSE SERPL-MCNC: 145 MG/DL — HIGH (ref 70–99)
HCT VFR BLD CALC: 25.1 % — LOW (ref 34.5–45)
HCT VFR BLD CALC: 25.1 % — LOW (ref 34.5–45)
HGB BLD-MCNC: 8 G/DL — LOW (ref 11.5–15.5)
HGB BLD-MCNC: 8 G/DL — LOW (ref 11.5–15.5)
IMM GRANULOCYTES NFR BLD AUTO: 0.5 % — SIGNIFICANT CHANGE UP (ref 0–1.5)
LYMPHOCYTES # BLD AUTO: 2.53 K/UL — SIGNIFICANT CHANGE UP (ref 1–3.3)
LYMPHOCYTES # BLD AUTO: 24 % — SIGNIFICANT CHANGE UP (ref 13–44)
MAGNESIUM SERPL-MCNC: 2.2 MG/DL — SIGNIFICANT CHANGE UP (ref 1.6–2.6)
MCHC RBC-ENTMCNC: 28.3 PG — SIGNIFICANT CHANGE UP (ref 27–34)
MCHC RBC-ENTMCNC: 28.3 PG — SIGNIFICANT CHANGE UP (ref 27–34)
MCHC RBC-ENTMCNC: 31.9 % — LOW (ref 32–36)
MCHC RBC-ENTMCNC: 31.9 % — LOW (ref 32–36)
MCV RBC AUTO: 88.7 FL — SIGNIFICANT CHANGE UP (ref 80–100)
MCV RBC AUTO: 88.7 FL — SIGNIFICANT CHANGE UP (ref 80–100)
MONOCYTES # BLD AUTO: 1.4 K/UL — HIGH (ref 0–0.9)
MONOCYTES NFR BLD AUTO: 13.3 % — SIGNIFICANT CHANGE UP (ref 2–14)
NEUTROPHILS # BLD AUTO: 5.95 K/UL — SIGNIFICANT CHANGE UP (ref 1.8–7.4)
NEUTROPHILS NFR BLD AUTO: 56.6 % — SIGNIFICANT CHANGE UP (ref 43–77)
NRBC # FLD: 0 K/UL — LOW (ref 25–125)
NRBC # FLD: 0 K/UL — LOW (ref 25–125)
PHOSPHATE SERPL-MCNC: 3.5 MG/DL — SIGNIFICANT CHANGE UP (ref 2.5–4.5)
PLATELET # BLD AUTO: 278 K/UL — SIGNIFICANT CHANGE UP (ref 150–400)
PLATELET # BLD AUTO: 278 K/UL — SIGNIFICANT CHANGE UP (ref 150–400)
PMV BLD: 9.6 FL — SIGNIFICANT CHANGE UP (ref 7–13)
PMV BLD: 9.6 FL — SIGNIFICANT CHANGE UP (ref 7–13)
POTASSIUM SERPL-MCNC: 3.8 MMOL/L — SIGNIFICANT CHANGE UP (ref 3.5–5.3)
POTASSIUM SERPL-SCNC: 3.8 MMOL/L — SIGNIFICANT CHANGE UP (ref 3.5–5.3)
RBC # BLD: 2.83 M/UL — LOW (ref 3.8–5.2)
RBC # BLD: 2.83 M/UL — LOW (ref 3.8–5.2)
RBC # FLD: 16.4 % — HIGH (ref 10.3–14.5)
RBC # FLD: 16.4 % — HIGH (ref 10.3–14.5)
SODIUM SERPL-SCNC: 132 MMOL/L — LOW (ref 135–145)
WBC # BLD: 10.52 K/UL — HIGH (ref 3.8–10.5)
WBC # BLD: 10.52 K/UL — HIGH (ref 3.8–10.5)
WBC # FLD AUTO: 10.52 K/UL — HIGH (ref 3.8–10.5)
WBC # FLD AUTO: 10.52 K/UL — HIGH (ref 3.8–10.5)

## 2019-02-24 RX ORDER — POTASSIUM CHLORIDE 20 MEQ
20 PACKET (EA) ORAL ONCE
Qty: 0 | Refills: 0 | Status: COMPLETED | OUTPATIENT
Start: 2019-02-24 | End: 2019-02-24

## 2019-02-24 RX ADMIN — MONTELUKAST 10 MILLIGRAM(S): 4 TABLET, CHEWABLE ORAL at 12:40

## 2019-02-24 RX ADMIN — PANTOPRAZOLE SODIUM 40 MILLIGRAM(S): 20 TABLET, DELAYED RELEASE ORAL at 05:50

## 2019-02-24 RX ADMIN — HYDROMORPHONE HYDROCHLORIDE 2 MILLIGRAM(S): 2 INJECTION INTRAMUSCULAR; INTRAVENOUS; SUBCUTANEOUS at 12:00

## 2019-02-24 RX ADMIN — GABAPENTIN 100 MILLIGRAM(S): 400 CAPSULE ORAL at 05:50

## 2019-02-24 RX ADMIN — OCTREOTIDE ACETATE 100 MICROGRAM(S): 200 INJECTION, SOLUTION INTRAVENOUS; SUBCUTANEOUS at 22:08

## 2019-02-24 RX ADMIN — ATORVASTATIN CALCIUM 10 MILLIGRAM(S): 80 TABLET, FILM COATED ORAL at 22:08

## 2019-02-24 RX ADMIN — MEROPENEM 100 MILLIGRAM(S): 1 INJECTION INTRAVENOUS at 05:50

## 2019-02-24 RX ADMIN — HYDROMORPHONE HYDROCHLORIDE 2 MILLIGRAM(S): 2 INJECTION INTRAMUSCULAR; INTRAVENOUS; SUBCUTANEOUS at 10:29

## 2019-02-24 RX ADMIN — MEROPENEM 100 MILLIGRAM(S): 1 INJECTION INTRAVENOUS at 14:45

## 2019-02-24 RX ADMIN — LINEZOLID 300 MILLIGRAM(S): 600 INJECTION, SOLUTION INTRAVENOUS at 17:15

## 2019-02-24 RX ADMIN — HYDROMORPHONE HYDROCHLORIDE 2 MILLIGRAM(S): 2 INJECTION INTRAMUSCULAR; INTRAVENOUS; SUBCUTANEOUS at 17:19

## 2019-02-24 RX ADMIN — OCTREOTIDE ACETATE 100 MICROGRAM(S): 200 INJECTION, SOLUTION INTRAVENOUS; SUBCUTANEOUS at 05:50

## 2019-02-24 RX ADMIN — Medication 2: at 12:40

## 2019-02-24 RX ADMIN — GABAPENTIN 100 MILLIGRAM(S): 400 CAPSULE ORAL at 14:46

## 2019-02-24 RX ADMIN — GABAPENTIN 100 MILLIGRAM(S): 400 CAPSULE ORAL at 22:08

## 2019-02-24 RX ADMIN — OCTREOTIDE ACETATE 100 MICROGRAM(S): 200 INJECTION, SOLUTION INTRAVENOUS; SUBCUTANEOUS at 14:46

## 2019-02-24 RX ADMIN — Medication 1 DROP(S): at 17:15

## 2019-02-24 RX ADMIN — Medication 20 MILLIEQUIVALENT(S): at 14:46

## 2019-02-24 RX ADMIN — LINEZOLID 300 MILLIGRAM(S): 600 INJECTION, SOLUTION INTRAVENOUS at 05:50

## 2019-02-24 RX ADMIN — MEROPENEM 100 MILLIGRAM(S): 1 INJECTION INTRAVENOUS at 22:08

## 2019-02-24 RX ADMIN — Medication 2: at 17:28

## 2019-02-24 RX ADMIN — Medication 2: at 09:10

## 2019-02-24 RX ADMIN — ENOXAPARIN SODIUM 40 MILLIGRAM(S): 100 INJECTION SUBCUTANEOUS at 12:40

## 2019-02-24 NOTE — PROGRESS NOTE ADULT - SUBJECTIVE AND OBJECTIVE BOX
GENERAL SURGERY DAILY PROGRESS NOTE:     Subjective:  Pt seen and examined. No acute events overnight. Tolerating diet, reports she is feeling improved. Denies n/v. Pain control improved. No new complaints.     Objective:  General: Appears well, NAD  Chest: Equal expansion bilaterally  Abdomen soft, nontender, nondistended, incision C/D/I with ostomy appliance around midline wound w/ minimal output some leaking at top of appliance, R - IR w/ milky output; L -IR  w/ SS output, Gurinder w/ serous/milky output.   Extremities: Grossly symmetric, SCD's in place     MEDICATIONS  (STANDING):  artificial  tears Solution 1 Drop(s) Both EYES every 12 hours  atorvastatin 10 milliGRAM(s) Oral at bedtime  dextrose 5%. 1000 milliLiter(s) (50 mL/Hr) IV Continuous <Continuous>  dextrose 50% Injectable 12.5 Gram(s) IV Push once  dextrose 50% Injectable 25 Gram(s) IV Push once  dextrose 50% Injectable 25 Gram(s) IV Push once  enoxaparin Injectable 40 milliGRAM(s) SubCutaneous daily  gabapentin 100 milliGRAM(s) Oral three times a day  insulin lispro (HumaLOG) corrective regimen sliding scale   SubCutaneous three times a day before meals  insulin lispro (HumaLOG) corrective regimen sliding scale   SubCutaneous at bedtime  lidocaine 1% Injectable 30 milliLiter(s) Local Injection once  linezolid  IVPB 600 milliGRAM(s) IV Intermittent every 12 hours  meropenem  IVPB 1000 milliGRAM(s) IV Intermittent every 8 hours  montelukast 10 milliGRAM(s) Oral daily  octreotide  Injectable 100 MICROGram(s) SubCutaneous every 8 hours  pantoprazole    Tablet 40 milliGRAM(s) Oral before breakfast    MEDICATIONS  (PRN):  acetaminophen   Tablet .. 650 milliGRAM(s) Oral every 6 hours PRN Mild Pain (1 - 3)  dextrose 40% Gel 15 Gram(s) Oral once PRN Blood Glucose LESS THAN 70 milliGRAM(s)/deciliter  glucagon  Injectable 1 milliGRAM(s) IntraMuscular once PRN Glucose LESS THAN 70 milligrams/deciliter  HYDROmorphone   Tablet 2 milliGRAM(s) Oral every 4 hours PRN Moderate Pain (4 - 6)      Vital Signs Last 24 Hrs  T(C): 36.8 (2019 21:29), Max: 38.1 (2019 13:03)  T(F): 98.3 (2019 21:29), Max: 100.6 (2019 13:03)  HR: 95 (:) (91 - 95)  BP: 117/56 (2019 21:29) (111/55 - 128/58)  BP(mean): 68 (2019 13:03) (68 - 74)  RR: 18 (:29) (18 - 18)  SpO2: 98% (:29) (96% - 100%)    I&O's Detail    2019 07:01  -  2019 07:00  --------------------------------------------------------  IN:    Oral Fluid: 720 mL  Total IN: 720 mL    OUT:    Bulb: 5 mL    Drain: 100 mL    Drain: 5 mL    Voided: 900 mL  Total OUT: 1010 mL    Total NET: -290 mL      2019 07:01  -  2019 01:10  --------------------------------------------------------  IN:    Oral Fluid: 120 mL  Total IN: 120 mL    OUT:  Total OUT: 0 mL    Total NET: 120 mL          Daily     Daily     LABS:                        7.9    12.39 )-----------( 302      ( 2019 06:36 )             25.0     02-23    129<L>  |  92<L>  |  5<L>  ----------------------------<  204<H>  4.2   |  26  |  0.48<L>    Ca    7.8<L>      2019 06:36  Phos  1.8     02-23  Mg     1.8     0223        Urinalysis Basic - ( 2019 17:45 )    Color: YELLOW / Appearance: CLEAR / S.020 / pH: 7.0  Gluc: NEGATIVE / Ketone: NEGATIVE  / Bili: NEGATIVE / Urobili: TRACE   Blood: NEGATIVE / Protein: 70 / Nitrite: NEGATIVE   Leuk Esterase: NEGATIVE / RBC: 3-5 / WBC 3-5   Sq Epi: FEW / Non Sq Epi: x / Bacteria: NEGATIVE        RADIOLOGY & ADDITIONAL STUDIES: GENERAL SURGERY DAILY PROGRESS NOTE:     Subjective:  Pt seen and examined. Fell this morning. Pt took her soft off and slipped on the floor. Denies dizziness LOC. On exam no bruises, no hematomas or fractures. Tolerating diet, reports she is feeling improved. Denies n/v. Pain control improved. No new complaints.     Objective:  General: Appears well, NAD  Chest: Equal expansion bilaterally  Abdomen soft, nontender, nondistended, incision C/D/I with ostomy appliance around midline wound w/ minimal output some leaking at top of appliance, R - IR w/ milky output; L -IR  w/ SS output, Gurinder w/ serous/milky output.   Extremities: Grossly symmetric, SCD's in place     MEDICATIONS  (STANDING):  artificial  tears Solution 1 Drop(s) Both EYES every 12 hours  atorvastatin 10 milliGRAM(s) Oral at bedtime  dextrose 5%. 1000 milliLiter(s) (50 mL/Hr) IV Continuous <Continuous>  dextrose 50% Injectable 12.5 Gram(s) IV Push once  dextrose 50% Injectable 25 Gram(s) IV Push once  dextrose 50% Injectable 25 Gram(s) IV Push once  enoxaparin Injectable 40 milliGRAM(s) SubCutaneous daily  gabapentin 100 milliGRAM(s) Oral three times a day  insulin lispro (HumaLOG) corrective regimen sliding scale   SubCutaneous three times a day before meals  insulin lispro (HumaLOG) corrective regimen sliding scale   SubCutaneous at bedtime  lidocaine 1% Injectable 30 milliLiter(s) Local Injection once  linezolid  IVPB 600 milliGRAM(s) IV Intermittent every 12 hours  meropenem  IVPB 1000 milliGRAM(s) IV Intermittent every 8 hours  montelukast 10 milliGRAM(s) Oral daily  octreotide  Injectable 100 MICROGram(s) SubCutaneous every 8 hours  pantoprazole    Tablet 40 milliGRAM(s) Oral before breakfast    MEDICATIONS  (PRN):  acetaminophen   Tablet .. 650 milliGRAM(s) Oral every 6 hours PRN Mild Pain (1 - 3)  dextrose 40% Gel 15 Gram(s) Oral once PRN Blood Glucose LESS THAN 70 milliGRAM(s)/deciliter  glucagon  Injectable 1 milliGRAM(s) IntraMuscular once PRN Glucose LESS THAN 70 milligrams/deciliter  HYDROmorphone   Tablet 2 milliGRAM(s) Oral every 4 hours PRN Moderate Pain (4 - 6)      Vital Signs Last 24 Hrs  T(C): 36.8 (2019 21:29), Max: 38.1 (2019 13:03)  T(F): 98.3 (:29), Max: 100.6 (2019 13:03)  HR: 95 (:29) (91 - 95)  BP: 117/56 (2019 21:29) (111/55 - 128/58)  BP(mean): 68 (2019 13:03) (68 - 74)  RR: 18 (2019 21:29) (18 - 18)  SpO2: 98% (:) (96% - 100%)    I&O's Detail    2019 07:01  -  2019 07:00  --------------------------------------------------------  IN:    Oral Fluid: 720 mL  Total IN: 720 mL    OUT:    Bulb: 5 mL    Drain: 100 mL    Drain: 5 mL    Voided: 900 mL  Total OUT: 1010 mL    Total NET: -290 mL      2019 07:01  -  2019 01:10  --------------------------------------------------------  IN:    Oral Fluid: 120 mL  Total IN: 120 mL    OUT:  Total OUT: 0 mL    Total NET: 120 mL          Daily     Daily     LABS:                        7.9    12.39 )-----------( 302      ( 2019 06:36 )             25.0         129<L>  |  92<L>  |  5<L>  ----------------------------<  204<H>  4.2   |  26  |  0.48<L>    Ca    7.8<L>      2019 06:36  Phos  1.8       Mg     1.8             Urinalysis Basic - ( 2019 17:45 )    Color: YELLOW / Appearance: CLEAR / S.020 / pH: 7.0  Gluc: NEGATIVE / Ketone: NEGATIVE  / Bili: NEGATIVE / Urobili: TRACE   Blood: NEGATIVE / Protein: 70 / Nitrite: NEGATIVE   Leuk Esterase: NEGATIVE / RBC: 3-5 / WBC 3-5   Sq Epi: FEW / Non Sq Epi: x / Bacteria: NEGATIVE        RADIOLOGY & ADDITIONAL STUDIES:

## 2019-02-24 NOTE — PROVIDER CONTACT NOTE (OTHER) - ASSESSMENT
Pt is alert and orientedx4. No visual lacerations or hematomas on her body or head. VSS as documented on sunrise.

## 2019-02-24 NOTE — PROGRESS NOTE ADULT - ASSESSMENT
54y Female PMHx HTN, T2DM, asthma, depression with recent diagnosis of invasive ampullary adenocarcinoma of the CBD admitted for cholangitis on 1/5, s/p ERCP 1/8, whipple 1/11, rapid response for GIB on floor 1/18 s/p MTP, RTOR s/p celiotomy, evacuation of hematoma, GDA bleed stopped and sutured, Abthera vac. Closure of abdomen with strattice mesh on 1/21, with inna drains and left lower JOURDAN drain in subcutaneous. Incisional bleed 1/23, bedside skin staples removed, JOURDAN drain removed, hemorrhage controlled, for continuation of ICU care. S/p interventional radiology drainage of LUQ collection 2/4. S/p completing closure w/ placing ostomy bag over fistula 2/18.       PLAN:  - R/o bl LE DVT - negative  - F/u Bcx and UA  - regular diet   - continue calorie count  - pain control as needed  - cont meropenem, linezolid per ID   - c/w octreotide  - PT/oob  - lovenox for DVT ppx    D team [id number]  D Team Surgery 54y Female PMHx HTN, T2DM, asthma, depression with recent diagnosis of invasive ampullary adenocarcinoma of the CBD admitted for cholangitis on 1/5, s/p ERCP 1/8, whipple 1/11, rapid response for GIB on floor 1/18 s/p MTP, RTOR s/p celiotomy, evacuation of hematoma, GDA bleed stopped and sutured, Abthera vac. Closure of abdomen with strattice mesh on 1/21, with inna drains and left lower JOURDAN drain in subcutaneous. Incisional bleed 1/23, bedside skin staples removed, JOURDAN drain removed, hemorrhage controlled, for continuation of ICU care. S/p interventional radiology drainage of LUQ collection 2/4. S/p completing closure w/ placing ostomy bag over fistula 2/18.       PLAN:  - F/u Bcx and UA - NTD  - regular diet   - continue calorie count  - pain control as needed  - cont meropenem, linezolid per ID   - c/w octreotide  - PT/oob  - lovenox for DVT ppx    Patient seen and discussed with Dr. Hearn  D team [id number]  D Team Surgery

## 2019-02-25 LAB
ANION GAP SERPL CALC-SCNC: 10 MMO/L — SIGNIFICANT CHANGE UP (ref 7–14)
BUN SERPL-MCNC: 5 MG/DL — LOW (ref 7–23)
CALCIUM SERPL-MCNC: 8.2 MG/DL — LOW (ref 8.4–10.5)
CHLORIDE SERPL-SCNC: 95 MMOL/L — LOW (ref 98–107)
CO2 SERPL-SCNC: 27 MMOL/L — SIGNIFICANT CHANGE UP (ref 22–31)
CREAT SERPL-MCNC: 0.47 MG/DL — LOW (ref 0.5–1.3)
GLUCOSE SERPL-MCNC: 158 MG/DL — HIGH (ref 70–99)
HCT VFR BLD CALC: 25.3 % — LOW (ref 34.5–45)
HGB BLD-MCNC: 7.8 G/DL — LOW (ref 11.5–15.5)
MAGNESIUM SERPL-MCNC: 1.9 MG/DL — SIGNIFICANT CHANGE UP (ref 1.6–2.6)
MCHC RBC-ENTMCNC: 28.3 PG — SIGNIFICANT CHANGE UP (ref 27–34)
MCHC RBC-ENTMCNC: 30.8 % — LOW (ref 32–36)
MCV RBC AUTO: 91.7 FL — SIGNIFICANT CHANGE UP (ref 80–100)
NRBC # FLD: 0 K/UL — LOW (ref 25–125)
PHOSPHATE SERPL-MCNC: 2.1 MG/DL — LOW (ref 2.5–4.5)
PLATELET # BLD AUTO: 269 K/UL — SIGNIFICANT CHANGE UP (ref 150–400)
PMV BLD: 9.6 FL — SIGNIFICANT CHANGE UP (ref 7–13)
POTASSIUM SERPL-MCNC: 4.6 MMOL/L — SIGNIFICANT CHANGE UP (ref 3.5–5.3)
POTASSIUM SERPL-SCNC: 4.6 MMOL/L — SIGNIFICANT CHANGE UP (ref 3.5–5.3)
RBC # BLD: 2.76 M/UL — LOW (ref 3.8–5.2)
RBC # FLD: 16.4 % — HIGH (ref 10.3–14.5)
SODIUM SERPL-SCNC: 132 MMOL/L — LOW (ref 135–145)
WBC # BLD: 12.92 K/UL — HIGH (ref 3.8–10.5)
WBC # FLD AUTO: 12.92 K/UL — HIGH (ref 3.8–10.5)

## 2019-02-25 PROCEDURE — 99232 SBSQ HOSP IP/OBS MODERATE 35: CPT

## 2019-02-25 RX ORDER — SIMETHICONE 80 MG/1
80 TABLET, CHEWABLE ORAL ONCE
Qty: 0 | Refills: 0 | Status: COMPLETED | OUTPATIENT
Start: 2019-02-25 | End: 2019-02-25

## 2019-02-25 RX ORDER — MAGNESIUM SULFATE 500 MG/ML
1 VIAL (ML) INJECTION ONCE
Qty: 0 | Refills: 0 | Status: COMPLETED | OUTPATIENT
Start: 2019-02-25 | End: 2019-02-25

## 2019-02-25 RX ADMIN — Medication 4: at 13:01

## 2019-02-25 RX ADMIN — MONTELUKAST 10 MILLIGRAM(S): 4 TABLET, CHEWABLE ORAL at 13:01

## 2019-02-25 RX ADMIN — Medication 6: at 09:07

## 2019-02-25 RX ADMIN — OCTREOTIDE ACETATE 100 MICROGRAM(S): 200 INJECTION, SOLUTION INTRAVENOUS; SUBCUTANEOUS at 05:16

## 2019-02-25 RX ADMIN — OCTREOTIDE ACETATE 100 MICROGRAM(S): 200 INJECTION, SOLUTION INTRAVENOUS; SUBCUTANEOUS at 22:25

## 2019-02-25 RX ADMIN — GABAPENTIN 100 MILLIGRAM(S): 400 CAPSULE ORAL at 13:01

## 2019-02-25 RX ADMIN — Medication 1 DROP(S): at 05:17

## 2019-02-25 RX ADMIN — HYDROMORPHONE HYDROCHLORIDE 2 MILLIGRAM(S): 2 INJECTION INTRAMUSCULAR; INTRAVENOUS; SUBCUTANEOUS at 09:29

## 2019-02-25 RX ADMIN — PANTOPRAZOLE SODIUM 40 MILLIGRAM(S): 20 TABLET, DELAYED RELEASE ORAL at 05:16

## 2019-02-25 RX ADMIN — HYDROMORPHONE HYDROCHLORIDE 2 MILLIGRAM(S): 2 INJECTION INTRAMUSCULAR; INTRAVENOUS; SUBCUTANEOUS at 08:01

## 2019-02-25 RX ADMIN — MEROPENEM 100 MILLIGRAM(S): 1 INJECTION INTRAVENOUS at 22:25

## 2019-02-25 RX ADMIN — MEROPENEM 100 MILLIGRAM(S): 1 INJECTION INTRAVENOUS at 15:14

## 2019-02-25 RX ADMIN — ATORVASTATIN CALCIUM 10 MILLIGRAM(S): 80 TABLET, FILM COATED ORAL at 22:24

## 2019-02-25 RX ADMIN — Medication 63.75 MILLIMOLE(S): at 11:05

## 2019-02-25 RX ADMIN — MEROPENEM 100 MILLIGRAM(S): 1 INJECTION INTRAVENOUS at 05:16

## 2019-02-25 RX ADMIN — HYDROMORPHONE HYDROCHLORIDE 2 MILLIGRAM(S): 2 INJECTION INTRAMUSCULAR; INTRAVENOUS; SUBCUTANEOUS at 09:01

## 2019-02-25 RX ADMIN — GABAPENTIN 100 MILLIGRAM(S): 400 CAPSULE ORAL at 05:16

## 2019-02-25 RX ADMIN — Medication 1 DROP(S): at 17:29

## 2019-02-25 RX ADMIN — SIMETHICONE 80 MILLIGRAM(S): 80 TABLET, CHEWABLE ORAL at 23:01

## 2019-02-25 RX ADMIN — ENOXAPARIN SODIUM 40 MILLIGRAM(S): 100 INJECTION SUBCUTANEOUS at 13:00

## 2019-02-25 RX ADMIN — Medication 100 GRAM(S): at 09:11

## 2019-02-25 RX ADMIN — OCTREOTIDE ACETATE 100 MICROGRAM(S): 200 INJECTION, SOLUTION INTRAVENOUS; SUBCUTANEOUS at 13:01

## 2019-02-25 RX ADMIN — GABAPENTIN 100 MILLIGRAM(S): 400 CAPSULE ORAL at 22:25

## 2019-02-25 RX ADMIN — LINEZOLID 300 MILLIGRAM(S): 600 INJECTION, SOLUTION INTRAVENOUS at 05:16

## 2019-02-25 NOTE — PROVIDER CONTACT NOTE (OTHER) - ACTION/TREATMENT ORDERED:
MD aware, no new orders.
Blood cultures ordered
MD at bedside assessing patient, will continue to monitor.
Perform orthostatic blood pressures as per MD order.
Decrease PCA demand dose as per order, non rebreather as per order
Dr. Hancock came to room to do a further assessment.
EKG, MD coming to assess
MD coming to assess, labs being ordered
Monitor.
No orders given, will continue to monitor.
Pressure held, dressing reinforced, labs sent as per SICU Team's orders. Will CTM closely.

## 2019-02-25 NOTE — PROVIDER CONTACT NOTE (OTHER) - RECOMMENDATIONS
MD made aware
MD made aware
Intrawound packing and reinforcement of dressing until Chief Surgical Resident arrives for further assessment.
MD come assess? Labs?
MD come to assess
Monitor.
O2?
Order CT of the head
Tylenol

## 2019-02-25 NOTE — PROGRESS NOTE ADULT - SUBJECTIVE AND OBJECTIVE BOX
CC: F/U for Abscesses    Saw/spoke to patient. No fevers, no chills x 24 hours. Otherwise  unchanged. Still pain at drain sites.    Allergies  No Known Allergies    ANTIMICROBIALS:  linezolid  IVPB 600 every 12 hours  meropenem  IVPB 1000 every 8 hours    PE:    Vital Signs Last 24 Hrs  T(C): 37.1 (25 Feb 2019 11:30), Max: 37.9 (25 Feb 2019 05:10)  T(F): 98.8 (25 Feb 2019 11:30), Max: 100.2 (25 Feb 2019 05:10)  HR: 92 (25 Feb 2019 11:30) (90 - 95)  BP: 105/62 (25 Feb 2019 11:30) (104/53 - 115/55)  RR: 18 (25 Feb 2019 11:30) (18 - 18)  SpO2: 98% (25 Feb 2019 11:30) (96% - 98%)    Gen: AOx3, NAD, non-toxic, pleasant  CV: S1+S2 normal, nontachycardic  Resp: Clear bilat, no resp distress, no crackles/wheezes  Abd: Soft, nontender, +BS, ostomy, drains  Ext: No LE edema, no wounds    LABS:                        7.8    12.92 )-----------( 269      ( 25 Feb 2019 04:55 )             25.3     02-25    132<L>  |  95<L>  |  5<L>  ----------------------------<  158<H>  4.6   |  27  |  0.47<L>    Ca    8.2<L>      25 Feb 2019 04:55  Phos  2.1     02-25  Mg     1.9     02-25    MICROBIOLOGY:    BLOOD VENOUS  02-21-19 NGTD    ABSCESS  02-12-19 --  --  Escherichia coli  Enterococcus faecalis VRE    ABSCESS  02-12-19 --  --  Escherichia coli  Enterobacter cloacae    (otherwise reviewed)    RADIOLOGY:    2/21 CXR:    INTERPRETATION:     Heart size cannot be accurately evaluated on this image. The mediastinum   and greg are unremarkable.  The right lung is clear.  There is continued left basilar and retrocardiac opacity with obscuration   of the left hemidiaphragm. The left upper and mid lung is clear.  No right pleural effusion is seen.  Surgical clips and a drain project over the abdomen.

## 2019-02-25 NOTE — PROVIDER CONTACT NOTE (OTHER) - SITUATION
pt's right FA arrow extended iv leaking
PT has temp and tachycardia post op day 2
Patient complains of dizziness and lightheadedness. Also reports seeing "dark spots."
Patient's heart rate is 51, blood pressure 135/75.
Pt HR and temp trending up, pulse ox trending down
Pt RR 12
Pt found on floor
Pt is profusely bleeding from the incision site. Pressure was held w/a 4x4 until the SICU Team arrived for bedside assessment.
Pt. AM temp: 100.2
pt s/p whipple patient temp now 101.6
pt s/p whipple. patient went to IR today, Left upper quadrant lucretia removed now site is leaking dressing changed 2x.

## 2019-02-25 NOTE — PROGRESS NOTE ADULT - ASSESSMENT
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  Fever, leukocytosis  Prolonged hospital course with cholangitis; Whipples on 1/11; GIB; abd closure with mesh  Then found to have to have multiple abdominal collections  2/4 abd cultures neg  2/12 abd cultures now with E coli, Enterobacter, Enterococcus (VRE)  CT A/P with multiple abdominal collections  Seems improving--no fevers, WBC stable  S/p IR tube check 2/10  S/p 7 day course for VRE, should be sufficient  Still intermittently febrile, slight leukocytosis--clinically improved  Overall, fever, leukocytosis, abd abscess, post operative state, positive culture finding  - Meropenem 1g q 8  - DC Zyvox s/p 7 day course  - Surgery team re: any further role for source control  - Drain per IR  - When DC planning, would plan to change to Cipro 500mg q 12 and Flagyl 500mg q 12 for 1 week further (drain still in place, intermittently febrile)  - Trend WBC    Manny Olivera MD  Pager [phone]  After 5pm and on weekends call [phone]

## 2019-02-25 NOTE — PROGRESS NOTE ADULT - ASSESSMENT
54y Female PMHx HTN, T2DM, asthma, depression with recent diagnosis of invasive ampullary adenocarcinoma of the CBD admitted for cholangitis on 1/5, s/p ERCP 1/8, whipple 1/11, rapid response for GIB on floor 1/18 s/p MTP, RTOR s/p celiotomy, evacuation of hematoma, GDA bleed stopped and sutured, Abthera vac. Closure of abdomen with strattice mesh on 1/21, with inna drains and left lower JOURDAN drain in subcutaneous. Incisional bleed 1/23, bedside skin staples removed, JOURDAN drain removed, hemorrhage controlled, for continuation of ICU care. S/p interventional radiology drainage of LUQ collection 2/4. S/p completing closure w/ placing ostomy bag over fistula 2/18.       PLAN:  - F/u Bcx and UA - NTD  - regular diet   - continue calorie count  - pain control as needed  - cont meropenem, linezolid per ID, determine abx for discharge  - c/w octreotide  - PT/oob  - lovenox for DVT ppx    D team [id number]  D Team Surgery

## 2019-02-26 LAB
ANION GAP SERPL CALC-SCNC: 11 MMO/L — SIGNIFICANT CHANGE UP (ref 7–14)
BACTERIA BLD CULT: SIGNIFICANT CHANGE UP
BACTERIA BLD CULT: SIGNIFICANT CHANGE UP
BUN SERPL-MCNC: 5 MG/DL — LOW (ref 7–23)
CALCIUM SERPL-MCNC: 8.2 MG/DL — LOW (ref 8.4–10.5)
CHLORIDE SERPL-SCNC: 95 MMOL/L — LOW (ref 98–107)
CO2 SERPL-SCNC: 27 MMOL/L — SIGNIFICANT CHANGE UP (ref 22–31)
CREAT SERPL-MCNC: 0.5 MG/DL — SIGNIFICANT CHANGE UP (ref 0.5–1.3)
GLUCOSE SERPL-MCNC: 154 MG/DL — HIGH (ref 70–99)
HCT VFR BLD CALC: 25.7 % — LOW (ref 34.5–45)
HGB BLD-MCNC: 7.9 G/DL — LOW (ref 11.5–15.5)
MAGNESIUM SERPL-MCNC: 2 MG/DL — SIGNIFICANT CHANGE UP (ref 1.6–2.6)
MCHC RBC-ENTMCNC: 28.3 PG — SIGNIFICANT CHANGE UP (ref 27–34)
MCHC RBC-ENTMCNC: 30.7 % — LOW (ref 32–36)
MCV RBC AUTO: 92.1 FL — SIGNIFICANT CHANGE UP (ref 80–100)
NRBC # FLD: 0 K/UL — LOW (ref 25–125)
PHOSPHATE SERPL-MCNC: 2.5 MG/DL — SIGNIFICANT CHANGE UP (ref 2.5–4.5)
PLATELET # BLD AUTO: 236 K/UL — SIGNIFICANT CHANGE UP (ref 150–400)
PMV BLD: 9.6 FL — SIGNIFICANT CHANGE UP (ref 7–13)
POTASSIUM SERPL-MCNC: 4.1 MMOL/L — SIGNIFICANT CHANGE UP (ref 3.5–5.3)
POTASSIUM SERPL-SCNC: 4.1 MMOL/L — SIGNIFICANT CHANGE UP (ref 3.5–5.3)
RBC # BLD: 2.79 M/UL — LOW (ref 3.8–5.2)
RBC # FLD: 16.3 % — HIGH (ref 10.3–14.5)
SODIUM SERPL-SCNC: 133 MMOL/L — LOW (ref 135–145)
WBC # BLD: 11.17 K/UL — HIGH (ref 3.8–10.5)
WBC # FLD AUTO: 11.17 K/UL — HIGH (ref 3.8–10.5)

## 2019-02-26 PROCEDURE — 99232 SBSQ HOSP IP/OBS MODERATE 35: CPT

## 2019-02-26 RX ADMIN — ATORVASTATIN CALCIUM 10 MILLIGRAM(S): 80 TABLET, FILM COATED ORAL at 22:52

## 2019-02-26 RX ADMIN — OCTREOTIDE ACETATE 100 MICROGRAM(S): 200 INJECTION, SOLUTION INTRAVENOUS; SUBCUTANEOUS at 05:43

## 2019-02-26 RX ADMIN — MEROPENEM 100 MILLIGRAM(S): 1 INJECTION INTRAVENOUS at 05:43

## 2019-02-26 RX ADMIN — PANTOPRAZOLE SODIUM 40 MILLIGRAM(S): 20 TABLET, DELAYED RELEASE ORAL at 05:43

## 2019-02-26 RX ADMIN — Medication 2: at 09:13

## 2019-02-26 RX ADMIN — GABAPENTIN 100 MILLIGRAM(S): 400 CAPSULE ORAL at 05:43

## 2019-02-26 RX ADMIN — Medication 1 DROP(S): at 05:43

## 2019-02-26 RX ADMIN — MONTELUKAST 10 MILLIGRAM(S): 4 TABLET, CHEWABLE ORAL at 13:04

## 2019-02-26 RX ADMIN — Medication 63.75 MILLIMOLE(S): at 09:12

## 2019-02-26 RX ADMIN — OCTREOTIDE ACETATE 100 MICROGRAM(S): 200 INJECTION, SOLUTION INTRAVENOUS; SUBCUTANEOUS at 13:04

## 2019-02-26 RX ADMIN — GABAPENTIN 100 MILLIGRAM(S): 400 CAPSULE ORAL at 13:04

## 2019-02-26 RX ADMIN — GABAPENTIN 100 MILLIGRAM(S): 400 CAPSULE ORAL at 22:52

## 2019-02-26 RX ADMIN — ENOXAPARIN SODIUM 40 MILLIGRAM(S): 100 INJECTION SUBCUTANEOUS at 13:04

## 2019-02-26 RX ADMIN — MEROPENEM 100 MILLIGRAM(S): 1 INJECTION INTRAVENOUS at 13:03

## 2019-02-26 RX ADMIN — MEROPENEM 100 MILLIGRAM(S): 1 INJECTION INTRAVENOUS at 22:52

## 2019-02-26 RX ADMIN — Medication 4: at 13:47

## 2019-02-26 NOTE — CHART NOTE - NSCHARTNOTEFT_GEN_A_CORE
NUTRITION FOLLOW-UP: Daughter as . She stated that the Pt is eating well. Pt has increased her intake overall but needs to eat in small frequent meals. Daughter states that Pt  drinks small amounts of Glucerna. Pt had no complaints of GI distress.    Weight: 61.2 kg    Labs: POCT- 181, 170, 135, 206; Na-133, BUN-5, Glu-154, Hgb A1c-5.1    Current Diet: Consistent Carbohydrate with 3 Glucerna Shakes    Enteral Recommendations:    RD to Remain Available: Sayra Chambers MS, RDN, CDN pager [phone]     Additional Recommendations:   1) Monitor weight, labs, po intake and skin integrity.

## 2019-02-26 NOTE — PROGRESS NOTE ADULT - SUBJECTIVE AND OBJECTIVE BOX
SURGERY DAILY PROGRESS NOTE:       SUBJECTIVE/ROS: Patient examined at bedside. No acute events overnight. Tolerates diet w/o N/V. +BM/+Flatus. Fistula with low output. OOB. Pain well controlled.          MEDICATIONS  (STANDING):  artificial  tears Solution 1 Drop(s) Both EYES every 12 hours  atorvastatin 10 milliGRAM(s) Oral at bedtime  dextrose 5%. 1000 milliLiter(s) (50 mL/Hr) IV Continuous <Continuous>  dextrose 50% Injectable 12.5 Gram(s) IV Push once  dextrose 50% Injectable 25 Gram(s) IV Push once  dextrose 50% Injectable 25 Gram(s) IV Push once  enoxaparin Injectable 40 milliGRAM(s) SubCutaneous daily  gabapentin 100 milliGRAM(s) Oral three times a day  insulin lispro (HumaLOG) corrective regimen sliding scale   SubCutaneous three times a day before meals  insulin lispro (HumaLOG) corrective regimen sliding scale   SubCutaneous at bedtime  lidocaine 1% Injectable 30 milliLiter(s) Local Injection once  meropenem  IVPB 1000 milliGRAM(s) IV Intermittent every 8 hours  montelukast 10 milliGRAM(s) Oral daily  octreotide  Injectable 100 MICROGram(s) SubCutaneous every 8 hours  pantoprazole    Tablet 40 milliGRAM(s) Oral before breakfast    MEDICATIONS  (PRN):  acetaminophen   Tablet .. 650 milliGRAM(s) Oral every 6 hours PRN Mild Pain (1 - 3)  dextrose 40% Gel 15 Gram(s) Oral once PRN Blood Glucose LESS THAN 70 milliGRAM(s)/deciliter  glucagon  Injectable 1 milliGRAM(s) IntraMuscular once PRN Glucose LESS THAN 70 milligrams/deciliter  HYDROmorphone   Tablet 2 milliGRAM(s) Oral every 4 hours PRN Moderate Pain (4 - 6)      OBJECTIVE:    Vital Signs Last 24 Hrs  T(C): 37.1 (26 Feb 2019 11:57), Max: 37.4 (25 Feb 2019 16:01)  T(F): 98.8 (26 Feb 2019 11:57), Max: 99.4 (25 Feb 2019 21:09)  HR: 93 (26 Feb 2019 11:57) (93 - 103)  BP: 111/58 (26 Feb 2019 11:57) (101/54 - 112/60)  BP(mean): --  RR: 18 (26 Feb 2019 11:57) (18 - 18)  SpO2: 100% (26 Feb 2019 11:57) (94% - 100%)        I&O's Detail    25 Feb 2019 07:01  -  26 Feb 2019 07:00  --------------------------------------------------------  IN:  Total IN: 0 mL    OUT:    Bulb: 5 mL    Bulb: 10 mL    Drain: 10 mL    Drain: 60 mL    Voided: 1200 mL  Total OUT: 1285 mL    Total NET: -1285 mL          Daily     Daily     LABS:                        7.9    11.17 )-----------( 236      ( 26 Feb 2019 05:26 )             25.7     02-26    133<L>  |  95<L>  |  5<L>  ----------------------------<  154<H>  4.1   |  27  |  0.50    Ca    8.2<L>      26 Feb 2019 05:26  Phos  2.5     02-26  Mg     2.0     02-26        Objective:  General: Appears well, NAD  Chest: Equal expansion bilaterally  Abdomen soft, nontender, nondistended, incision C/D/I with ostomy appliance around midline wound w/ minimal output some leaking at top of appliance, R - IR w/ milky output; L -IR  w/ SS output  Extremities: Grossly symmetric, SCD's in place

## 2019-02-26 NOTE — PROGRESS NOTE ADULT - SUBJECTIVE AND OBJECTIVE BOX
CC: F/U for Abscesses    Saw/spoke to patient. No fevers, no chills. No new complaints. Otherwise unchanged.    Allergies  No Known Allergies    ANTIMICROBIALS:  meropenem  IVPB 1000 every 8 hours    PE:    Vital Signs Last 24 Hrs  T(C): 37.1 (26 Feb 2019 11:57), Max: 37.4 (25 Feb 2019 16:01)  T(F): 98.8 (26 Feb 2019 11:57), Max: 99.4 (25 Feb 2019 21:09)  HR: 93 (26 Feb 2019 11:57) (93 - 103)  BP: 111/58 (26 Feb 2019 11:57) (101/54 - 112/60)  RR: 18 (26 Feb 2019 11:57) (18 - 18)  SpO2: 100% (26 Feb 2019 11:57) (94% - 100%)    Gen: AOx3, NAD, non-toxic, pleasant  CV: S1+S2 normal, nontachycardic  Resp: Clear bilat, no resp distress, no crackles/wheezes  Abd: Soft, nontender, +BS, drains  Ext: No LE edema, no wounds    LABS:                        7.9    11.17 )-----------( 236      ( 26 Feb 2019 05:26 )             25.7     02-26    133<L>  |  95<L>  |  5<L>  ----------------------------<  154<H>  4.1   |  27  |  0.50    Ca    8.2<L>      26 Feb 2019 05:26  Phos  2.5     02-26  Mg     2.0     02-26    MICROBIOLOGY:    BLOOD VENOUS  02-21-19 NGTD    ABSCESS  02-12-19 --  --  Escherichia coli  Enterococcus faecalis VRE    ABSCESS  02-12-19 --  --  Escherichia coli  Enterobacter cloacae    (otherwise reviewed)    RADIOLOGY:    2/21 XR:    IMPRESSION:  Left basilar and retrocardiac opacity suggesting a small   left pleural effusion with passive atelectasis. Underlying atelectasis of   other cause and or pneumonia is not excluded.

## 2019-02-26 NOTE — PROGRESS NOTE ADULT - ASSESSMENT
54y Female PMHx HTN, T2DM, asthma, depression with recent diagnosis of invasive ampullary adenocarcinoma of the CBD admitted for cholangitis on 1/5, s/p ERCP 1/8, whipple 1/11, rapid response for GIB on floor 1/18 s/p MTP, RTOR s/p celiotomy, evacuation of hematoma, GDA bleed stopped and sutured, Abthera vac. Closure of abdomen with strattice mesh on 1/21, with inna drains and left lower JOURDAN drain in subcutaneous. Incisional bleed 1/23, bedside skin staples removed, JOURDAN drain removed, hemorrhage controlled, for continuation of ICU care. S/p interventional radiology drainage of LUQ collection 2/4. S/p completing closure w/ placing ostomy bag over fistula 2/18. Bcx and UA - NTD.      PLAN:  - regular diet   - pain control as needed  - cont meropenem, linezolid per ID, determine abx for discharge  - c/w octreotide  - PT/oob  - lovenox for DVT ppx    D team [id number] 54y Female PMHx HTN, T2DM, asthma, depression with recent diagnosis of invasive ampullary adenocarcinoma of the CBD admitted for cholangitis on 1/5, s/p ERCP 1/8, whipple 1/11, rapid response for GIB on floor 1/18 s/p MTP, RTOR s/p celiotomy, evacuation of hematoma, GDA bleed stopped and sutured, Abthera vac. Closure of abdomen with strattice mesh on 1/21, with inna drains and left lower JOURDAN drain in subcutaneous. Incisional bleed 1/23, bedside skin staples removed, JOURDAN drain removed, hemorrhage controlled, for continuation of ICU care. S/p interventional radiology drainage of LUQ collection 2/4. S/p completing closure w/ placing ostomy bag over fistula 2/18. Bcx and UA - NTD.      PLAN:  - regular diet   - pain control as needed  - cont meropenem, linezolid and change to Cipro 500mg q 12 and Flagyl 500mg q 12 for 1 week further after discharge   - c/w octreotide  - Ostomy teaching  - PT/oob  - lovenox for DVT ppx  - Dispo planning     D team [id number]

## 2019-02-26 NOTE — PROGRESS NOTE ADULT - ASSESSMENT
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  Fever, leukocytosis  Prolonged hospital course with cholangitis; Whipples on 1/11; GIB; abd closure with mesh  Then found to have to have multiple abdominal collections  2/4 abd cultures neg  2/12 abd cultures now with E coli, Enterobacter, Enterococcus (VRE)  CT A/P with multiple abdominal collections  Seems improving--no fevers, WBC stable  S/p IR tube check 2/10  S/p 7 day course for VRE  Afeb x 24, WBC stable, improved  Overall, fever, leukocytosis, abd abscess, post operative state, positive culture finding  - Meropenem 1g q 8  - Surgery team re: any further role for source control  - Drain per IR  - When DC planning, would plan to change to Cipro 500mg q 12 and Flagyl 500mg q 12 for 1 week further (drain still in place, intermittently febrile)  - Trend WBC    Manny Olivera MD  Pager [phone]  After 5pm and on weekends call [phone]

## 2019-02-27 LAB
ANION GAP SERPL CALC-SCNC: 12 MMO/L — SIGNIFICANT CHANGE UP (ref 7–14)
BUN SERPL-MCNC: 7 MG/DL — SIGNIFICANT CHANGE UP (ref 7–23)
CALCIUM SERPL-MCNC: 8.3 MG/DL — LOW (ref 8.4–10.5)
CHLORIDE SERPL-SCNC: 100 MMOL/L — SIGNIFICANT CHANGE UP (ref 98–107)
CO2 SERPL-SCNC: 25 MMOL/L — SIGNIFICANT CHANGE UP (ref 22–31)
CREAT SERPL-MCNC: 0.42 MG/DL — LOW (ref 0.5–1.3)
GLUCOSE SERPL-MCNC: 158 MG/DL — HIGH (ref 70–99)
HCT VFR BLD CALC: 23.3 % — LOW (ref 34.5–45)
HGB BLD-MCNC: 7.4 G/DL — LOW (ref 11.5–15.5)
MAGNESIUM SERPL-MCNC: 1.8 MG/DL — SIGNIFICANT CHANGE UP (ref 1.6–2.6)
MCHC RBC-ENTMCNC: 28.2 PG — SIGNIFICANT CHANGE UP (ref 27–34)
MCHC RBC-ENTMCNC: 31.8 % — LOW (ref 32–36)
MCV RBC AUTO: 88.9 FL — SIGNIFICANT CHANGE UP (ref 80–100)
NRBC # FLD: 0 K/UL — LOW (ref 25–125)
PHOSPHATE SERPL-MCNC: 1.9 MG/DL — LOW (ref 2.5–4.5)
PLATELET # BLD AUTO: 227 K/UL — SIGNIFICANT CHANGE UP (ref 150–400)
PMV BLD: 9.6 FL — SIGNIFICANT CHANGE UP (ref 7–13)
POTASSIUM SERPL-MCNC: 4 MMOL/L — SIGNIFICANT CHANGE UP (ref 3.5–5.3)
POTASSIUM SERPL-SCNC: 4 MMOL/L — SIGNIFICANT CHANGE UP (ref 3.5–5.3)
RBC # BLD: 2.62 M/UL — LOW (ref 3.8–5.2)
RBC # FLD: 16.3 % — HIGH (ref 10.3–14.5)
SODIUM SERPL-SCNC: 137 MMOL/L — SIGNIFICANT CHANGE UP (ref 135–145)
WBC # BLD: 10.97 K/UL — HIGH (ref 3.8–10.5)
WBC # FLD AUTO: 10.97 K/UL — HIGH (ref 3.8–10.5)

## 2019-02-27 PROCEDURE — 99231 SBSQ HOSP IP/OBS SF/LOW 25: CPT

## 2019-02-27 PROCEDURE — 99232 SBSQ HOSP IP/OBS MODERATE 35: CPT

## 2019-02-27 RX ORDER — MAGNESIUM SULFATE 500 MG/ML
2 VIAL (ML) INJECTION ONCE
Qty: 0 | Refills: 0 | Status: COMPLETED | OUTPATIENT
Start: 2019-02-27 | End: 2019-02-27

## 2019-02-27 RX ADMIN — ATORVASTATIN CALCIUM 10 MILLIGRAM(S): 80 TABLET, FILM COATED ORAL at 22:46

## 2019-02-27 RX ADMIN — MEROPENEM 100 MILLIGRAM(S): 1 INJECTION INTRAVENOUS at 12:18

## 2019-02-27 RX ADMIN — PANTOPRAZOLE SODIUM 40 MILLIGRAM(S): 20 TABLET, DELAYED RELEASE ORAL at 05:26

## 2019-02-27 RX ADMIN — GABAPENTIN 100 MILLIGRAM(S): 400 CAPSULE ORAL at 05:26

## 2019-02-27 RX ADMIN — Medication 63.75 MILLIMOLE(S): at 10:39

## 2019-02-27 RX ADMIN — Medication 50 GRAM(S): at 10:42

## 2019-02-27 RX ADMIN — Medication 2: at 17:26

## 2019-02-27 RX ADMIN — GABAPENTIN 100 MILLIGRAM(S): 400 CAPSULE ORAL at 22:46

## 2019-02-27 RX ADMIN — MEROPENEM 100 MILLIGRAM(S): 1 INJECTION INTRAVENOUS at 22:46

## 2019-02-27 RX ADMIN — ENOXAPARIN SODIUM 40 MILLIGRAM(S): 100 INJECTION SUBCUTANEOUS at 12:18

## 2019-02-27 RX ADMIN — Medication 2: at 08:44

## 2019-02-27 RX ADMIN — GABAPENTIN 100 MILLIGRAM(S): 400 CAPSULE ORAL at 12:18

## 2019-02-27 RX ADMIN — MEROPENEM 100 MILLIGRAM(S): 1 INJECTION INTRAVENOUS at 05:26

## 2019-02-27 RX ADMIN — Medication 4: at 12:47

## 2019-02-27 RX ADMIN — MONTELUKAST 10 MILLIGRAM(S): 4 TABLET, CHEWABLE ORAL at 12:18

## 2019-02-27 NOTE — PROGRESS NOTE ADULT - SUBJECTIVE AND OBJECTIVE BOX
CC: F/U for abscess    Saw/spoke to patient. Patient feels improved today. No fevers, no chills. Otherwise well.    Allergies  No Known Allergies    ANTIMICROBIALS:  meropenem  IVPB 1000 every 8 hours    PE:    Vital Signs Last 24 Hrs  T(C): 36.8 (27 Feb 2019 08:37), Max: 37.1 (26 Feb 2019 11:57)  T(F): 98.2 (27 Feb 2019 08:37), Max: 98.8 (26 Feb 2019 11:57)  HR: 96 (27 Feb 2019 08:37) (90 - 100)  BP: 142/63 (27 Feb 2019 08:37) (101/55 - 142/63)  RR: 20 (27 Feb 2019 08:37) (18 - 20)  SpO2: 98% (27 Feb 2019 08:37) (98% - 100%)    Gen: AOx3, NAD, non-toxic  CV: S1+S2 normal, nontachycardic  Resp: Clear bilat, no resp distress, no crackles/wheezes  Abd: Soft, nontender, +BS, ostomy, drains  Ext: No LE edema, no wounds    LABS:                        7.4    10.97 )-----------( 227      ( 27 Feb 2019 05:13 )             23.3     02-27    137  |  100  |  7   ----------------------------<  158<H>  4.0   |  25  |  0.42<L>    Ca    8.3<L>      27 Feb 2019 05:13  Phos  1.9     02-27  Mg     1.8     02-27    MICROBIOLOGY:    BLOOD VENOUS  02-21-19 NGTD    ABSCESS  02-12-19 --  --  Escherichia coli  Enterococcus faecalis VRE    ABSCESS  02-12-19 --  --  Escherichia coli  Enterobacter cloacae    (otherwise reviewed)    RADIOLOGY:    CXR 2/21:    INTERPRETATION:     Heart size cannot be accurately evaluated on this image. The mediastinum   and greg are unremarkable.  The right lung is clear.  There is continued left basilar and retrocardiac opacity with obscuration   of the left hemidiaphragm. The left upper and mid lung is clear.  No right pleural effusion is seen.  Surgical clips and a drain project over the abdomen.    IMPRESSION:  Left basilar and retrocardiac opacity suggesting a small   left pleural effusion with passive atelectasis. Underlying atelectasis of   other cause and or pneumonia is not excluded.

## 2019-02-27 NOTE — PROGRESS NOTE ADULT - ASSESSMENT
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  Fever, leukocytosis  Prolonged hospital course with cholangitis; Whipples on 1/11; GIB; abd closure with mesh  Then found to have to have multiple abdominal collections  2/4 abd cultures neg  2/12 abd cultures now with E coli, Enterobacter, Enterococcus (VRE)  CT A/P with multiple abdominal collections  Seems improving--no fevers, WBC stable  S/p IR tube check 2/10  S/p 7 day course for VRE  Afeb x 24, WBC stable, improved; remains well today  Overall, fever, leukocytosis, abd abscess, post operative state, positive culture finding  - Meropenem 1g q 8  - Surgery team re: any further role for source control  - Drain per IR  - When DC planning, would plan to change to Cipro 500mg q 12 and Flagyl 500mg q 12 for 1 week further (drain still in place, intermittently febrile)  - Trend WBC    Manny Olivera MD  Pager [phone]  After 5pm and on weekends call [phone]

## 2019-02-27 NOTE — PROGRESS NOTE ADULT - SUBJECTIVE AND OBJECTIVE BOX
54y Female s/p IR drainage of LLQ and RLQ abscess.     Patient seen and examined bedside resting comfortably. No complaints offered.    T(F): 97.5 (02-27-19 @ 12:36), Max: 98.5 (02-27-19 @ 05:25)  HR: 90 (02-27-19 @ 12:36) (90 - 100)  BP: 115/64 (02-27-19 @ 12:36) (101/55 - 142/63)  RR: 18 (02-27-19 @ 12:36) (18 - 20)  SpO2: 100% (02-27-19 @ 12:36) (98% - 100%)  Wt(kg): --    LABS:                        7.4    10.97 )-----------( 227      ( 27 Feb 2019 05:13 )             23.3     02-27    137  |  100  |  7   ----------------------------<  158<H>  4.0   |  25  |  0.42<L>    Ca    8.3<L>      27 Feb 2019 05:13  Phos  1.9     02-27  Mg     1.8     02-27        I&O's Detail    26 Feb 2019 07:01  -  27 Feb 2019 07:00  --------------------------------------------------------  IN:  Total IN: 0 mL    OUT:    Bulb: 5 mL    Drain: 50 mL  Total OUT: 55 mL    Total NET: -55 mL      27 Feb 2019 07:01  -  27 Feb 2019 16:31  --------------------------------------------------------  IN:  Total IN: 0 mL    OUT:    Voided: 600 mL  Total OUT: 600 mL    Total NET: -600 mL    PHYSICAL EXAM:  General: Nontoxic, in NAD  Neuro:  Alert & oriented x 3  CV: +S1+S2 regular rate and rhythm  Lung: clear to ausculation bilaterally, respirations nonlabored, good inspiratory effort  Abdomen: soft, NTND. Normactive BS. RLQ and LLQ drains c/d/i  Extremities: no pedal edema or calf tenderness noted     Impression: 54y Female PMHx HTN, T2DM, asthma, depression with recent diagnosis of invasive ampullary adenocarcinoma of the CBD admitted for cholangitis on 1/5, s/p ERCP 1/8, whipple 1/11, rapid response for GIB on floor 1/18 s/p MTP, RTOR s/p celiotomy, evacuation of hematoma, GDA bleed stopped and sutured. S/p interventional radiology drainage of LUQ, LLQ and RLQ collection.  - Continue drainage, monitor output  - Plan for noncontrast CT + IR tube check tomorrow due to low outputs from drain.    [id number]

## 2019-02-27 NOTE — PROGRESS NOTE ADULT - ASSESSMENT
54y Female PMHx HTN, T2DM, asthma, depression with recent diagnosis of invasive ampullary adenocarcinoma of the CBD admitted for cholangitis on 1/5, s/p ERCP 1/8, whipple 1/11, rapid response for GIB on floor 1/18 s/p MTP, RTOR s/p celiotomy, evacuation of hematoma, GDA bleed stopped and sutured, Abthera vac. Closure of abdomen with strattice mesh on 1/21, with inna drains and left lower JOURDAN drain in subcutaneous. Incisional bleed 1/23, bedside skin staples removed, JOURDAN drain removed, hemorrhage controlled, for continuation of ICU care. S/p interventional radiology drainage of LUQ collection 2/4. S/p completing closure w/ placing ostomy bag over fistula 2/18. Bcx and UA - NTD.      PLAN:  - IR tube check on Thursday   - regular diet   - pain control as needed  - cont meropenem, linezolid and change to Cipro 500mg q 12 and Flagyl 500mg q 12 for 1 week further after discharge   - d/c'ed octreotide on 2/26  - Ostomy teaching  - PT/oob  - lovenox for DVT ppx  - Dispo planning     D team [id number]

## 2019-02-27 NOTE — PROGRESS NOTE ADULT - SUBJECTIVE AND OBJECTIVE BOX
SURGERY DAILY PROGRESS NOTE:       SUBJECTIVE/ROS: Patient examined at bedside. No acute events overnight. Tolerates diet w/o N/V. +BM/+Flatus. Pain well controlled. OOB.          MEDICATIONS  (STANDING):  artificial  tears Solution 1 Drop(s) Both EYES every 12 hours  atorvastatin 10 milliGRAM(s) Oral at bedtime  dextrose 5%. 1000 milliLiter(s) (50 mL/Hr) IV Continuous <Continuous>  dextrose 50% Injectable 12.5 Gram(s) IV Push once  dextrose 50% Injectable 25 Gram(s) IV Push once  dextrose 50% Injectable 25 Gram(s) IV Push once  enoxaparin Injectable 40 milliGRAM(s) SubCutaneous daily  gabapentin 100 milliGRAM(s) Oral three times a day  insulin lispro (HumaLOG) corrective regimen sliding scale   SubCutaneous three times a day before meals  insulin lispro (HumaLOG) corrective regimen sliding scale   SubCutaneous at bedtime  lidocaine 1% Injectable 30 milliLiter(s) Local Injection once  meropenem  IVPB 1000 milliGRAM(s) IV Intermittent every 8 hours  montelukast 10 milliGRAM(s) Oral daily  pantoprazole    Tablet 40 milliGRAM(s) Oral before breakfast    MEDICATIONS  (PRN):  acetaminophen   Tablet .. 650 milliGRAM(s) Oral every 6 hours PRN Mild Pain (1 - 3)  dextrose 40% Gel 15 Gram(s) Oral once PRN Blood Glucose LESS THAN 70 milliGRAM(s)/deciliter  glucagon  Injectable 1 milliGRAM(s) IntraMuscular once PRN Glucose LESS THAN 70 milligrams/deciliter  HYDROmorphone   Tablet 2 milliGRAM(s) Oral every 4 hours PRN Moderate Pain (4 - 6)      OBJECTIVE:    Vital Signs Last 24 Hrs  T(C): 36.9 (27 Feb 2019 01:02), Max: 37.1 (26 Feb 2019 11:57)  T(F): 98.4 (27 Feb 2019 01:02), Max: 98.8 (26 Feb 2019 11:57)  HR: 90 (27 Feb 2019 01:02) (90 - 100)  BP: 101/55 (27 Feb 2019 01:02) (101/55 - 131/66)  BP(mean): --  RR: 18 (27 Feb 2019 01:02) (18 - 18)  SpO2: 98% (27 Feb 2019 01:02) (98% - 100%)        I&O's Detail    25 Feb 2019 07:01  -  26 Feb 2019 07:00  --------------------------------------------------------  IN:  Total IN: 0 mL    OUT:    Bulb: 5 mL    Bulb: 10 mL    Drain: 10 mL    Drain: 60 mL    Voided: 1200 mL  Total OUT: 1285 mL    Total NET: -1285 mL      26 Feb 2019 07:01  -  27 Feb 2019 03:46  --------------------------------------------------------  IN:  Total IN: 0 mL    OUT:    Bulb: 5 mL    Drain: 50 mL  Total OUT: 55 mL    Total NET: -55 mL          Daily     Daily     LABS:                        7.9    11.17 )-----------( 236      ( 26 Feb 2019 05:26 )             25.7     02-26    133<L>  |  95<L>  |  5<L>  ----------------------------<  154<H>  4.1   |  27  |  0.50    Ca    8.2<L>      26 Feb 2019 05:26  Phos  2.5     02-26  Mg     2.0     02-26            Objective:  General: Appears well, NAD  Chest: Equal expansion bilaterally  Abdomen soft, nontender, nondistended, incision C/D/I with ostomy appliance around midline wound w/ minimal output some leaking at top of appliance, R - IR w/ milky output; L -IR  w/ SS output  Extremities: Grossly symmetric, SCD's in place

## 2019-02-28 LAB
ANION GAP SERPL CALC-SCNC: 12 MMO/L — SIGNIFICANT CHANGE UP (ref 7–14)
BLD GP AB SCN SERPL QL: NEGATIVE — SIGNIFICANT CHANGE UP
BUN SERPL-MCNC: 6 MG/DL — LOW (ref 7–23)
CALCIUM SERPL-MCNC: 8.2 MG/DL — LOW (ref 8.4–10.5)
CHLORIDE SERPL-SCNC: 100 MMOL/L — SIGNIFICANT CHANGE UP (ref 98–107)
CO2 SERPL-SCNC: 26 MMOL/L — SIGNIFICANT CHANGE UP (ref 22–31)
CREAT SERPL-MCNC: 0.42 MG/DL — LOW (ref 0.5–1.3)
GLUCOSE SERPL-MCNC: 134 MG/DL — HIGH (ref 70–99)
HCT VFR BLD CALC: 22.7 % — LOW (ref 34.5–45)
HGB BLD-MCNC: 7.4 G/DL — LOW (ref 11.5–15.5)
MAGNESIUM SERPL-MCNC: 2 MG/DL — SIGNIFICANT CHANGE UP (ref 1.6–2.6)
MCHC RBC-ENTMCNC: 28.8 PG — SIGNIFICANT CHANGE UP (ref 27–34)
MCHC RBC-ENTMCNC: 32.6 % — SIGNIFICANT CHANGE UP (ref 32–36)
MCV RBC AUTO: 88.3 FL — SIGNIFICANT CHANGE UP (ref 80–100)
NRBC # FLD: 0 K/UL — LOW (ref 25–125)
PHOSPHATE SERPL-MCNC: 2.2 MG/DL — LOW (ref 2.5–4.5)
PLATELET # BLD AUTO: 198 K/UL — SIGNIFICANT CHANGE UP (ref 150–400)
PMV BLD: 9.5 FL — SIGNIFICANT CHANGE UP (ref 7–13)
POTASSIUM SERPL-MCNC: 3.3 MMOL/L — LOW (ref 3.5–5.3)
POTASSIUM SERPL-SCNC: 3.3 MMOL/L — LOW (ref 3.5–5.3)
RBC # BLD: 2.57 M/UL — LOW (ref 3.8–5.2)
RBC # FLD: 16.3 % — HIGH (ref 10.3–14.5)
RH IG SCN BLD-IMP: POSITIVE — SIGNIFICANT CHANGE UP
SODIUM SERPL-SCNC: 138 MMOL/L — SIGNIFICANT CHANGE UP (ref 135–145)
WBC # BLD: 13.42 K/UL — HIGH (ref 3.8–10.5)
WBC # FLD AUTO: 13.42 K/UL — HIGH (ref 3.8–10.5)

## 2019-02-28 PROCEDURE — 99232 SBSQ HOSP IP/OBS MODERATE 35: CPT

## 2019-02-28 PROCEDURE — 74150 CT ABDOMEN W/O CONTRAST: CPT | Mod: 26

## 2019-02-28 PROCEDURE — 71045 X-RAY EXAM CHEST 1 VIEW: CPT | Mod: 26

## 2019-02-28 PROCEDURE — 49423 EXCHANGE DRAINAGE CATHETER: CPT

## 2019-02-28 PROCEDURE — 75984 XRAY CONTROL CATHETER CHANGE: CPT | Mod: 26

## 2019-02-28 PROCEDURE — 74176 CT ABD & PELVIS W/O CONTRAST: CPT | Mod: 26

## 2019-02-28 RX ORDER — POTASSIUM CHLORIDE 20 MEQ
20 PACKET (EA) ORAL
Qty: 0 | Refills: 0 | Status: COMPLETED | OUTPATIENT
Start: 2019-02-28 | End: 2019-02-28

## 2019-02-28 RX ADMIN — GABAPENTIN 100 MILLIGRAM(S): 400 CAPSULE ORAL at 16:58

## 2019-02-28 RX ADMIN — ENOXAPARIN SODIUM 40 MILLIGRAM(S): 100 INJECTION SUBCUTANEOUS at 12:52

## 2019-02-28 RX ADMIN — GABAPENTIN 100 MILLIGRAM(S): 400 CAPSULE ORAL at 21:01

## 2019-02-28 RX ADMIN — MEROPENEM 100 MILLIGRAM(S): 1 INJECTION INTRAVENOUS at 21:01

## 2019-02-28 RX ADMIN — ATORVASTATIN CALCIUM 10 MILLIGRAM(S): 80 TABLET, FILM COATED ORAL at 21:01

## 2019-02-28 RX ADMIN — Medication 20 MILLIEQUIVALENT(S): at 16:59

## 2019-02-28 RX ADMIN — Medication 20 MILLIEQUIVALENT(S): at 18:22

## 2019-02-28 RX ADMIN — Medication 2: at 08:57

## 2019-02-28 RX ADMIN — Medication 2: at 12:52

## 2019-02-28 RX ADMIN — MONTELUKAST 10 MILLIGRAM(S): 4 TABLET, CHEWABLE ORAL at 12:52

## 2019-02-28 RX ADMIN — PANTOPRAZOLE SODIUM 40 MILLIGRAM(S): 20 TABLET, DELAYED RELEASE ORAL at 06:23

## 2019-02-28 RX ADMIN — GABAPENTIN 100 MILLIGRAM(S): 400 CAPSULE ORAL at 06:23

## 2019-02-28 RX ADMIN — Medication 100 MILLIGRAM(S): at 18:22

## 2019-02-28 RX ADMIN — MEROPENEM 100 MILLIGRAM(S): 1 INJECTION INTRAVENOUS at 16:58

## 2019-02-28 RX ADMIN — Medication 1 DROP(S): at 18:22

## 2019-02-28 RX ADMIN — MEROPENEM 100 MILLIGRAM(S): 1 INJECTION INTRAVENOUS at 06:23

## 2019-02-28 NOTE — CHART NOTE - NSCHARTNOTEFT_GEN_A_CORE
Pre-Procedure Interventional Radiology Note    Patient Age:54    Patient Gender: Female    Procedure: Tube check    Diagnosis/Indication: 54y Female PMHx HTN, T2DM, asthma, depression with recent diagnosis of invasive ampullary adenocarcinoma of the CBD admitted for cholangitis       Interventional Radiology Attending Physician:    Ordering Attending Physician: Dr Bourne    Pertinent labs: CBC (02-28 @ 05:40)                              7.4<L>                         13.42<H>  )----------------(  198        --    % Neutrophils, --    % Lymphocytes, ANC: --                                  22.7<L>              CBC (02-27 @ 05:13)                              7.4<L>                         10.97<H>  )----------------(  227        --    % Neutrophils, --    % Lymphocytes, ANC: --                                  23.3<L>                BMP (02-28 @ 05:40)             138     |  100     |  6<L>  		Ca++ --      Ca 8.2<L>             ---------------------------------( 134<H>		Mg 2.0                3.3<L>  |  26      |  0.42<L>			Ph 2.2<L>  BMP (02-27 @ 05:13)             137     |  100     |  7     		Ca++ --      Ca 8.3<L>             ---------------------------------( 158<H>		Mg 1.8                4.0     |  25      |  0.42<L>			Ph 1.9<L>      -> BLOOD VENOUS Culture (02-21 @ 21:33)   NG     Patient and family aware?  [ x] Yes    [ ] No    D team Surgery [id number]

## 2019-02-28 NOTE — ADVANCED PRACTICE NURSE CONSULT - ASSESSMENT
Midline abdominal wound midline along a surgical incision: measures [id number]. Large amount of loose, tan/light brown drainage. Periwound skin with blue sutures in place at 6 and 12 o'clock extending from wound edges. No induration, no increased warmth, no erythema. Goal of care: drainage containment, protect periwound skin.

## 2019-02-28 NOTE — PROGRESS NOTE ADULT - SUBJECTIVE AND OBJECTIVE BOX
CC: F/U for Abscesses    Saw/spoke to patient. No fevers, no chills. Overall well. No new complaints.    Allergies  No Known Allergies    ANTIMICROBIALS:  meropenem  IVPB 1000 every 8 hours    PE:    Vital Signs Last 24 Hrs  T(C): 36.8 (28 Feb 2019 08:52), Max: 37.2 (27 Feb 2019 19:19)  T(F): 98.2 (28 Feb 2019 08:52), Max: 98.9 (27 Feb 2019 19:19)  HR: 94 (28 Feb 2019 08:52) (90 - 94)  BP: 136/70 (28 Feb 2019 08:52) (115/64 - 138/65)  RR: 16 (28 Feb 2019 08:52) (15 - 18)  SpO2: 99% (28 Feb 2019 08:52) (99% - 100%)    Gen: AOx3, NAD, non-toxic, pleasant  CV: S1+S2 normal, nontachycardic  Resp: Clear bilat, no resp distress, no crackles/wheezes  Abd: Soft, nontender, +BS, ostomy, drains  Ext: No LE edema, no wounds    LABS:                        7.4    13.42 )-----------( 198      ( 28 Feb 2019 05:40 )             22.7     02-28    138  |  100  |  6<L>  ----------------------------<  134<H>  3.3<L>   |  26  |  0.42<L>    Ca    8.2<L>      28 Feb 2019 05:40  Phos  2.2     02-28  Mg     2.0     02-28    MICROBIOLOGY:    BLOOD VENOUS  02-21-19 NGTD    ABSCESS  02-12-19 --  --  Escherichia coli  Enterococcus faecalis VRE    ABSCESS  02-12-19 --  --  Escherichia coli  Enterobacter cloacae    (otherwise reviewed)    RADIOLOGY:    2/21 CXR:    IMPRESSION:  Left basilar and retrocardiac opacity suggesting a small   left pleural effusion with passive atelectasis. Underlying atelectasis of   other cause and or pneumonia is not excluded.

## 2019-02-28 NOTE — PROGRESS NOTE ADULT - ASSESSMENT
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  Fever, leukocytosis  Prolonged hospital course with cholangitis; Whipples on 1/11; GIB; abd closure with mesh  Then found to have to have multiple abdominal collections  2/4 abd cultures neg  2/12 abd cultures now with E coli, Enterobacter, Enterococcus (VRE)  CT A/P with multiple abdominal collections  Seems improving--no fevers, WBC stable  S/p IR tube check 2/10  S/p 7 day course for VRE  Afeb x 24, WBC stable, improved; remains well today  Overall, fever, leukocytosis, abd abscess, post operative state, positive culture finding  - Meropenem 1g q 8  - Surgery team re: any further role for source control  - Drain per IR--planned for CT for follow up?  - When DC planning, would plan to change to Cipro 500mg q 12 and Flagyl 500mg q 12 for 1 week further depending on IR plan for drain  - Trend WBC    Manny Olivera MD  Pager [phone]  After 5pm and on weekends call [phone]

## 2019-02-28 NOTE — PROGRESS NOTE ADULT - ASSESSMENT
54y Female PMHx HTN, T2DM, asthma, depression with recent diagnosis of invasive ampullary adenocarcinoma of the CBD admitted for cholangitis on 1/5, s/p ERCP 1/8, whipple 1/11, rapid response for GIB on floor 1/18 s/p MTP, RTOR s/p celiotomy, evacuation of hematoma, GDA bleed stopped and sutured, Abthera vac. Closure of abdomen with strattice mesh on 1/21, with inna drains and left lower JOURDAN drain in subcutaneous. Incisional bleed 1/23, bedside skin staples removed, JOURDAN drain removed, hemorrhage controlled, for continuation of ICU care. S/p interventional radiology drainage of LUQ collection 2/4. S/p completing closure w/ placing ostomy bag over fistula 2/18. Bcx and UA - NTD.      PLAN:  - IR tube check today  - f/u cxr - pt coughing  - regular diet   - pain control as needed  - cont meropenem, linezolid and change to Cipro 500mg q 12 and Flagyl 500mg q 12 for 1 week further after discharge   - d/c'ed octreotide on 2/26  - Ostomy teaching  - PT/oob  - lovenox for DVT ppx  - Dispo planning for tmr    D team [id number]

## 2019-02-28 NOTE — PROGRESS NOTE ADULT - SUBJECTIVE AND OBJECTIVE BOX
GENERAL SURGERY DAILY PROGRESS NOTE:     Subjective:  Pt seen and examined. No acute events overnight. Pt tolerating diet, reports some discomfort with coughing. IR to do tube check today. Pt and daughter have worked with ostomy nurses.     Objective:  General: Appears well, NAD  Chest: Equal expansion bilaterally  Abdomen soft, nontender, nondistended, incision C/D/I with ostomy appliance around midline wound w/ minimal output some leaking at top of appliance, R - IR w/ milky output; L -IR  w/ SS output  Extremities: Grossly symmetric, SCD's in place     MEDICATIONS  (STANDING):  artificial  tears Solution 1 Drop(s) Both EYES every 12 hours  atorvastatin 10 milliGRAM(s) Oral at bedtime  dextrose 5%. 1000 milliLiter(s) (50 mL/Hr) IV Continuous <Continuous>  dextrose 50% Injectable 12.5 Gram(s) IV Push once  dextrose 50% Injectable 25 Gram(s) IV Push once  dextrose 50% Injectable 25 Gram(s) IV Push once  enoxaparin Injectable 40 milliGRAM(s) SubCutaneous daily  gabapentin 100 milliGRAM(s) Oral three times a day  insulin lispro (HumaLOG) corrective regimen sliding scale   SubCutaneous three times a day before meals  insulin lispro (HumaLOG) corrective regimen sliding scale   SubCutaneous at bedtime  lidocaine 1% Injectable 30 milliLiter(s) Local Injection once  meropenem  IVPB 1000 milliGRAM(s) IV Intermittent every 8 hours  montelukast 10 milliGRAM(s) Oral daily  pantoprazole    Tablet 40 milliGRAM(s) Oral before breakfast    MEDICATIONS  (PRN):  acetaminophen   Tablet .. 650 milliGRAM(s) Oral every 6 hours PRN Mild Pain (1 - 3)  dextrose 40% Gel 15 Gram(s) Oral once PRN Blood Glucose LESS THAN 70 milliGRAM(s)/deciliter  glucagon  Injectable 1 milliGRAM(s) IntraMuscular once PRN Glucose LESS THAN 70 milligrams/deciliter  HYDROmorphone   Tablet 2 milliGRAM(s) Oral every 4 hours PRN Moderate Pain (4 - 6)      Vital Signs Last 24 Hrs  T(C): 36.8 (28 Feb 2019 08:52), Max: 37.2 (27 Feb 2019 19:19)  T(F): 98.2 (28 Feb 2019 08:52), Max: 98.9 (27 Feb 2019 19:19)  HR: 94 (28 Feb 2019 08:52) (90 - 94)  BP: 136/70 (28 Feb 2019 08:52) (115/64 - 138/65)  BP(mean): --  RR: 16 (28 Feb 2019 08:52) (15 - 18)  SpO2: 99% (28 Feb 2019 08:52) (99% - 100%)    I&O's Detail    27 Feb 2019 07:01  -  28 Feb 2019 07:00  --------------------------------------------------------  IN:  Total IN: 0 mL    OUT:    Bulb: 10 mL    Drain: 5 mL    Drain: 35 mL    Voided: 600 mL  Total OUT: 650 mL    Total NET: -650 mL          Daily     Daily     LABS:                        7.4    13.42 )-----------( 198      ( 28 Feb 2019 05:40 )             22.7     02-28    138  |  100  |  6<L>  ----------------------------<  134<H>  3.3<L>   |  26  |  0.42<L>    Ca    8.2<L>      28 Feb 2019 05:40  Phos  2.2     02-28  Mg     2.0     02-28            RADIOLOGY & ADDITIONAL STUDIES:

## 2019-02-28 NOTE — CHART NOTE - NSCHARTNOTESELECT_GEN_ALL_CORE
Post Op Note
Event Note
Event Note
Event Note/POC US
Event Note/Pre-IR note
Event Note/Preop note
Event Note/oncology
Follow  up/Nutrition Services
GI/Event Note
IR Pre Procedure/Event Note
Malnutrition Alert Notification/Nutrition Services
Nutrition Follow/Up Note/Nutrition Services
Nutrition Follow/Up Note/Nutrition Services
Nutrition Services/Follow Up Note
Nutrition Services/Nutrition Follow/Up Note
POC US/Event Note
Plastics/Event Note
Post Procedure Check/Event Note
Post-op check/Event Note
PostOp Check
Preop Note
Surgery/Event Note
Transfer Note
Wound VAC removal

## 2019-02-28 NOTE — ADVANCED PRACTICE NURSE CONSULT - RECOMMEDATIONS
Please call wound care service line is further assistance is needed ([phone]).
Recommend follow up care at [facility] ([phone], [address] [place] [address]).    Pouching:  -Cleanse skin with water, pat dry.  -Apply stoma powder, dust off excess. Seal in with liquid barrier film.  -Cut 1 piece ostomy pouch (item # [id number]) to template created. Then remove plastic backing.  -Apply megan barrier ring (item #[id number]) to back of pouching system.  -Re-apply pouch to abdominal wound.  -Empty pouch when 1/2 full.  -Change entire pouch 2-3 times per week.    Please call wound care service line is further assistance is needed ([phone]).

## 2019-02-28 NOTE — ADVANCED PRACTICE NURSE CONSULT - REASON FOR CONSULT
Patient seen for management of midline abdominal wound pouching. Daughter at bedside for teaching session of pouch change.

## 2019-03-01 LAB
ANION GAP SERPL CALC-SCNC: 11 MMO/L — SIGNIFICANT CHANGE UP (ref 7–14)
BUN SERPL-MCNC: 5 MG/DL — LOW (ref 7–23)
CALCIUM SERPL-MCNC: 8.7 MG/DL — SIGNIFICANT CHANGE UP (ref 8.4–10.5)
CHLORIDE SERPL-SCNC: 121 MMOL/L — HIGH (ref 98–107)
CO2 SERPL-SCNC: 24 MMOL/L — SIGNIFICANT CHANGE UP (ref 22–31)
CREAT SERPL-MCNC: 0.45 MG/DL — LOW (ref 0.5–1.3)
GLUCOSE SERPL-MCNC: 120 MG/DL — HIGH (ref 70–99)
HCT VFR BLD CALC: 22.4 % — LOW (ref 34.5–45)
HGB BLD-MCNC: 7.3 G/DL — LOW (ref 11.5–15.5)
MAGNESIUM SERPL-MCNC: 1.8 MG/DL — SIGNIFICANT CHANGE UP (ref 1.6–2.6)
MCHC RBC-ENTMCNC: 29 PG — SIGNIFICANT CHANGE UP (ref 27–34)
MCHC RBC-ENTMCNC: 32.6 % — SIGNIFICANT CHANGE UP (ref 32–36)
MCV RBC AUTO: 88.9 FL — SIGNIFICANT CHANGE UP (ref 80–100)
NRBC # FLD: 0 K/UL — LOW (ref 25–125)
PHOSPHATE SERPL-MCNC: 1.8 MG/DL — LOW (ref 2.5–4.5)
PLATELET # BLD AUTO: 196 K/UL — SIGNIFICANT CHANGE UP (ref 150–400)
PMV BLD: 9.6 FL — SIGNIFICANT CHANGE UP (ref 7–13)
POTASSIUM SERPL-MCNC: 3.6 MMOL/L — SIGNIFICANT CHANGE UP (ref 3.5–5.3)
POTASSIUM SERPL-SCNC: 3.6 MMOL/L — SIGNIFICANT CHANGE UP (ref 3.5–5.3)
RBC # BLD: 2.52 M/UL — LOW (ref 3.8–5.2)
RBC # FLD: 16.4 % — HIGH (ref 10.3–14.5)
SODIUM SERPL-SCNC: 138 MMOL/L — SIGNIFICANT CHANGE UP (ref 135–145)
WBC # BLD: 12.23 K/UL — HIGH (ref 3.8–10.5)
WBC # FLD AUTO: 12.23 K/UL — HIGH (ref 3.8–10.5)

## 2019-03-01 PROCEDURE — 99232 SBSQ HOSP IP/OBS MODERATE 35: CPT

## 2019-03-01 RX ORDER — MONTELUKAST 4 MG/1
1 TABLET, CHEWABLE ORAL
Qty: 0 | Refills: 0 | DISCHARGE
Start: 2019-03-01

## 2019-03-01 RX ORDER — GABAPENTIN 400 MG/1
1 CAPSULE ORAL
Qty: 15 | Refills: 0
Start: 2019-03-01 | End: 2019-03-05

## 2019-03-01 RX ORDER — SODIUM,POTASSIUM PHOSPHATES 278-250MG
1 POWDER IN PACKET (EA) ORAL EVERY 4 HOURS
Qty: 0 | Refills: 0 | Status: COMPLETED | OUTPATIENT
Start: 2019-03-01 | End: 2019-03-01

## 2019-03-01 RX ORDER — HYDROMORPHONE HYDROCHLORIDE 2 MG/ML
1 INJECTION INTRAMUSCULAR; INTRAVENOUS; SUBCUTANEOUS
Qty: 20 | Refills: 0 | OUTPATIENT
Start: 2019-03-01

## 2019-03-01 RX ORDER — ENOXAPARIN SODIUM 100 MG/ML
40 INJECTION SUBCUTANEOUS
Qty: 1 | Refills: 0 | OUTPATIENT
Start: 2019-03-01 | End: 2019-03-28

## 2019-03-01 RX ORDER — ACETAMINOPHEN 500 MG
2 TABLET ORAL
Qty: 0 | Refills: 0 | COMMUNITY
Start: 2019-03-01

## 2019-03-01 RX ORDER — MONTELUKAST 4 MG/1
1 TABLET, CHEWABLE ORAL
Qty: 0 | Refills: 0 | COMMUNITY

## 2019-03-01 RX ORDER — CIPROFLOXACIN LACTATE 400MG/40ML
1 VIAL (ML) INTRAVENOUS
Qty: 14 | Refills: 0 | OUTPATIENT
Start: 2019-03-01 | End: 2019-03-07

## 2019-03-01 RX ORDER — METRONIDAZOLE 500 MG
1 TABLET ORAL
Qty: 21 | Refills: 0 | OUTPATIENT
Start: 2019-03-01 | End: 2019-03-07

## 2019-03-01 RX ORDER — GABAPENTIN 400 MG/1
1 CAPSULE ORAL
Qty: 0 | Refills: 0 | COMMUNITY

## 2019-03-01 RX ADMIN — Medication 1 DROP(S): at 05:40

## 2019-03-01 RX ADMIN — PANTOPRAZOLE SODIUM 40 MILLIGRAM(S): 20 TABLET, DELAYED RELEASE ORAL at 05:40

## 2019-03-01 RX ADMIN — ATORVASTATIN CALCIUM 10 MILLIGRAM(S): 80 TABLET, FILM COATED ORAL at 21:03

## 2019-03-01 RX ADMIN — Medication 2: at 13:06

## 2019-03-01 RX ADMIN — Medication 1 DROP(S): at 17:52

## 2019-03-01 RX ADMIN — MEROPENEM 100 MILLIGRAM(S): 1 INJECTION INTRAVENOUS at 05:41

## 2019-03-01 RX ADMIN — GABAPENTIN 100 MILLIGRAM(S): 400 CAPSULE ORAL at 21:03

## 2019-03-01 RX ADMIN — Medication 1 PACKET(S): at 13:17

## 2019-03-01 RX ADMIN — ENOXAPARIN SODIUM 40 MILLIGRAM(S): 100 INJECTION SUBCUTANEOUS at 13:16

## 2019-03-01 RX ADMIN — Medication 2: at 09:12

## 2019-03-01 RX ADMIN — MEROPENEM 100 MILLIGRAM(S): 1 INJECTION INTRAVENOUS at 21:03

## 2019-03-01 RX ADMIN — Medication 1 PACKET(S): at 17:52

## 2019-03-01 RX ADMIN — GABAPENTIN 100 MILLIGRAM(S): 400 CAPSULE ORAL at 13:16

## 2019-03-01 RX ADMIN — MEROPENEM 100 MILLIGRAM(S): 1 INJECTION INTRAVENOUS at 13:16

## 2019-03-01 RX ADMIN — GABAPENTIN 100 MILLIGRAM(S): 400 CAPSULE ORAL at 05:40

## 2019-03-01 RX ADMIN — MONTELUKAST 10 MILLIGRAM(S): 4 TABLET, CHEWABLE ORAL at 13:16

## 2019-03-01 NOTE — PROGRESS NOTE ADULT - SUBJECTIVE AND OBJECTIVE BOX
GENERAL SURGERY DAILY PROGRESS NOTE:     Subjective:  Pt seen and examined. No acute events overnight. Yesterday tube check by IR and one drain removed. Tolerating diet, denies n/v. Reports that she is coughing and cxr performed waiting for final read. Afebrile and vitals stable after the tube check.     Objective:  General: Appears well, NAD  Chest: Equal expansion bilaterally  Abdomen soft, nontender, nondistended, incision C/D/I with ostomy appliance around midline wound w/ minimal output some leaking at top of appliance, L -IR  w/ SS output  Extremities: Grossly symmetric, SCD's in place       MEDICATIONS  (STANDING):  artificial  tears Solution 1 Drop(s) Both EYES every 12 hours  atorvastatin 10 milliGRAM(s) Oral at bedtime  dextrose 5%. 1000 milliLiter(s) (50 mL/Hr) IV Continuous <Continuous>  dextrose 50% Injectable 12.5 Gram(s) IV Push once  dextrose 50% Injectable 25 Gram(s) IV Push once  dextrose 50% Injectable 25 Gram(s) IV Push once  enoxaparin Injectable 40 milliGRAM(s) SubCutaneous daily  gabapentin 100 milliGRAM(s) Oral three times a day  insulin lispro (HumaLOG) corrective regimen sliding scale   SubCutaneous three times a day before meals  insulin lispro (HumaLOG) corrective regimen sliding scale   SubCutaneous at bedtime  lidocaine 1% Injectable 30 milliLiter(s) Local Injection once  meropenem  IVPB 1000 milliGRAM(s) IV Intermittent every 8 hours  montelukast 10 milliGRAM(s) Oral daily  pantoprazole    Tablet 40 milliGRAM(s) Oral before breakfast    MEDICATIONS  (PRN):  acetaminophen   Tablet .. 650 milliGRAM(s) Oral every 6 hours PRN Mild Pain (1 - 3)  dextrose 40% Gel 15 Gram(s) Oral once PRN Blood Glucose LESS THAN 70 milliGRAM(s)/deciliter  glucagon  Injectable 1 milliGRAM(s) IntraMuscular once PRN Glucose LESS THAN 70 milligrams/deciliter  guaiFENesin    Syrup 100 milliGRAM(s) Oral every 6 hours PRN Cough  HYDROmorphone   Tablet 2 milliGRAM(s) Oral every 4 hours PRN Moderate Pain (4 - 6)      Vital Signs Last 24 Hrs  T(C): 37.5 (28 Feb 2019 23:49), Max: 37.5 (28 Feb 2019 23:49)  T(F): 99.5 (28 Feb 2019 23:49), Max: 99.5 (28 Feb 2019 23:49)  HR: 95 (28 Feb 2019 23:49) (94 - 99)  BP: 118/59 (28 Feb 2019 23:49) (118/59 - 138/65)  BP(mean): --  RR: 16 (28 Feb 2019 23:49) (15 - 18)  SpO2: 100% (28 Feb 2019 23:49) (99% - 100%)    I&O's Detail    27 Feb 2019 07:01  -  28 Feb 2019 07:00  --------------------------------------------------------  IN:  Total IN: 0 mL    OUT:    Bulb: 10 mL    Drain: 5 mL    Drain: 35 mL    Voided: 600 mL  Total OUT: 650 mL    Total NET: -650 mL      28 Feb 2019 07:01  -  01 Mar 2019 03:36  --------------------------------------------------------  IN:    Oral Fluid: 620 mL  Total IN: 620 mL    OUT:    Bulb: 35 mL    Drain: 20 mL    Voided: 570 mL  Total OUT: 625 mL    Total NET: -5 mL          Daily     Daily     LABS:                        7.4    13.42 )-----------( 198      ( 28 Feb 2019 05:40 )             22.7     02-28    138  |  100  |  6<L>  ----------------------------<  134<H>  3.3<L>   |  26  |  0.42<L>    Ca    8.2<L>      28 Feb 2019 05:40  Phos  2.2     02-28  Mg     2.0     02-28            RADIOLOGY & ADDITIONAL STUDIES:

## 2019-03-01 NOTE — PROGRESS NOTE ADULT - ASSESSMENT
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  Fever, leukocytosis  Prolonged hospital course with cholangitis; Whipples on 1/11; GIB; abd closure with mesh  Then found to have to have multiple abdominal collections  2/4 abd cultures neg  2/12 abd cultures now with E coli, Enterobacter, Enterococcus (VRE)  CT A/P with multiple abdominal collections  Seems improving--no fevers, WBC stable  S/p IR tube check 2/10, 2/28  New CT A/P with reassuring pelvic drain, but persistent cavity at other drain (reviewed IR note)  S/p 7 day course for VRE  Afeb x 24, WBC stable, improved; remains well today  Overall, fever, leukocytosis, abd abscess, post operative state, positive culture finding  - Meropenem 1g q 8  - Surgery team re: any further role for source control  - Drain per IR  - When DC planning, would plan to change to Cipro 500mg q 12 and Flagyl 500mg q 12 for 7 days beyond most recent tube check  - Trend WBC    Manny Olivera MD  Pager [phone]  After 5pm and on weekends call [phone]

## 2019-03-01 NOTE — PROGRESS NOTE ADULT - SUBJECTIVE AND OBJECTIVE BOX
CC: F/U for abscess    Saw/spoke to patient. No fevers, no chills. Doing well. No new complaints.    Allergies  No Known Allergies    ANTIMICROBIALS:  meropenem  IVPB 1000 every 8 hours    PE:    Vital Signs Last 24 Hrs  T(C): 36.8 (01 Mar 2019 08:00), Max: 37.5 (28 Feb 2019 23:49)  T(F): 98.3 (01 Mar 2019 08:00), Max: 99.5 (28 Feb 2019 23:49)  HR: 93 (01 Mar 2019 08:00) (92 - 95)  BP: 120/58 (01 Mar 2019 08:00) (118/59 - 132/66)  RR: 18 (01 Mar 2019 08:00) (16 - 18)  SpO2: 99% (01 Mar 2019 08:00) (99% - 100%)    Gen: AOx3, NAD, non-toxic, pleasant  CV: S1+S2 normal, nontachycardic  Resp: Clear bilat, no resp distress, no crackles/wheezes  Abd: Soft, nontender, +BS, ostomy  Ext: No LE edema, no wounds    LABS:                        7.3    12.23 )-----------( 196      ( 01 Mar 2019 05:00 )             22.4     03-01    138  |  103  |  5<L>  ----------------------------<  120<H>  3.6   |  24  |  0.45<L>    Ca    8.7      01 Mar 2019 05:00  Phos  1.8     03-01  Mg     1.8     03-01    MICROBIOLOGY:    BLOOD VENOUS  02-21-19 NGTD    ABSCESS  02-12-19 --  --  Escherichia coli  Enterococcus faecalis VRE    ABSCESS  02-12-19 --  --  Escherichia coli  Enterobacter cloacae    (otherwise reviewed)    RADIOLOGY:    2/28 CT:    IMPRESSION:  Tube check revealing resolution of the pelvic drain. The pelvic drain was   removed  Persistent cavity along the left lower quadrant hematoma drain. TPA was   injected into the collection for 30 minutes yielding an additional 20 cc   of liquefied hematoma.    2/28 CXR:    FINDINGS:     Cardiac silhouette is not accurately evaluated in this projection.  Unchanged left pleural effusion with associated passive atelectasis.  No pneumothorax .  Partially imaged left abdominal pigtail catheter.    IMPRESSION:    Unchanged left pleural effusion with associated passive   atelectasis/consolidation.

## 2019-03-01 NOTE — PROGRESS NOTE ADULT - ASSESSMENT
54y Female PMHx HTN, T2DM, asthma, depression with recent diagnosis of invasive ampullary adenocarcinoma of the CBD admitted for cholangitis on 1/5, s/p ERCP 1/8, whipple 1/11, rapid response for GIB on floor 1/18 s/p MTP, RTOR s/p celiotomy, evacuation of hematoma, GDA bleed stopped and sutured, Abthera vac. Closure of abdomen with strattice mesh on 1/21, with inna drains and left lower JOURDAN drain in subcutaneous. Incisional bleed 1/23, bedside skin staples removed, JOURDAN drain removed, hemorrhage controlled, for continuation of ICU care. S/p interventional radiology drainage of LUQ collection 2/4. S/p completing closure w/ placing ostomy bag over fistula 2/18. Bcx and UA - NTD.      PLAN:  - f/u cxr - pt coughing  - regular diet   - pain control as needed  - cont meropenem, linezolid and change to Cipro 500mg q 12 and Flagyl 500mg q 12 for 1 week further after discharge   - d/c'ed octreotide on 2/26  - Ostomy teaching  - PT/oob  - lovenox for DVT ppx  - Dispo planning for today    D team [id number]

## 2019-03-02 PROCEDURE — 93010 ELECTROCARDIOGRAM REPORT: CPT

## 2019-03-02 RX ADMIN — GABAPENTIN 100 MILLIGRAM(S): 400 CAPSULE ORAL at 05:28

## 2019-03-02 RX ADMIN — ATORVASTATIN CALCIUM 10 MILLIGRAM(S): 80 TABLET, FILM COATED ORAL at 21:32

## 2019-03-02 RX ADMIN — PANTOPRAZOLE SODIUM 40 MILLIGRAM(S): 20 TABLET, DELAYED RELEASE ORAL at 05:28

## 2019-03-02 RX ADMIN — MEROPENEM 100 MILLIGRAM(S): 1 INJECTION INTRAVENOUS at 13:06

## 2019-03-02 RX ADMIN — MONTELUKAST 10 MILLIGRAM(S): 4 TABLET, CHEWABLE ORAL at 13:06

## 2019-03-02 RX ADMIN — Medication 2: at 08:55

## 2019-03-02 RX ADMIN — Medication 2: at 18:04

## 2019-03-02 RX ADMIN — MEROPENEM 100 MILLIGRAM(S): 1 INJECTION INTRAVENOUS at 05:28

## 2019-03-02 RX ADMIN — MEROPENEM 100 MILLIGRAM(S): 1 INJECTION INTRAVENOUS at 21:32

## 2019-03-02 RX ADMIN — Medication 1 DROP(S): at 05:28

## 2019-03-02 RX ADMIN — GABAPENTIN 100 MILLIGRAM(S): 400 CAPSULE ORAL at 21:32

## 2019-03-02 RX ADMIN — ENOXAPARIN SODIUM 40 MILLIGRAM(S): 100 INJECTION SUBCUTANEOUS at 13:06

## 2019-03-02 RX ADMIN — GABAPENTIN 100 MILLIGRAM(S): 400 CAPSULE ORAL at 13:06

## 2019-03-02 NOTE — PROGRESS NOTE ADULT - SUBJECTIVE AND OBJECTIVE BOX
SURGERY DAILY PROGRESS NOTE:       SUBJECTIVE/ROS: Patient examined at bedside. No acute events overnight. Tolerates diet w/o N/V. +BM/+Flatus. OOB.          MEDICATIONS  (STANDING):  artificial  tears Solution 1 Drop(s) Both EYES every 12 hours  atorvastatin 10 milliGRAM(s) Oral at bedtime  dextrose 5%. 1000 milliLiter(s) (50 mL/Hr) IV Continuous <Continuous>  dextrose 50% Injectable 12.5 Gram(s) IV Push once  dextrose 50% Injectable 25 Gram(s) IV Push once  dextrose 50% Injectable 25 Gram(s) IV Push once  enoxaparin Injectable 40 milliGRAM(s) SubCutaneous daily  gabapentin 100 milliGRAM(s) Oral three times a day  insulin lispro (HumaLOG) corrective regimen sliding scale   SubCutaneous three times a day before meals  insulin lispro (HumaLOG) corrective regimen sliding scale   SubCutaneous at bedtime  lidocaine 1% Injectable 30 milliLiter(s) Local Injection once  meropenem  IVPB 1000 milliGRAM(s) IV Intermittent every 8 hours  montelukast 10 milliGRAM(s) Oral daily  pantoprazole    Tablet 40 milliGRAM(s) Oral before breakfast    MEDICATIONS  (PRN):  acetaminophen   Tablet .. 650 milliGRAM(s) Oral every 6 hours PRN Mild Pain (1 - 3)  dextrose 40% Gel 15 Gram(s) Oral once PRN Blood Glucose LESS THAN 70 milliGRAM(s)/deciliter  glucagon  Injectable 1 milliGRAM(s) IntraMuscular once PRN Glucose LESS THAN 70 milligrams/deciliter  guaiFENesin    Syrup 100 milliGRAM(s) Oral every 6 hours PRN Cough  HYDROmorphone   Tablet 2 milliGRAM(s) Oral every 4 hours PRN Moderate Pain (4 - 6)      OBJECTIVE:    Vital Signs Last 24 Hrs  T(C): 36.7 (02 Mar 2019 05:27), Max: 37.2 (01 Mar 2019 16:38)  T(F): 98.1 (02 Mar 2019 05:27), Max: 99 (01 Mar 2019 16:38)  HR: 94 (02 Mar 2019 05:27) (93 - 101)  BP: 121/63 (02 Mar 2019 05:27) (118/60 - 134/76)  BP(mean): --  RR: 18 (02 Mar 2019 05:27) (16 - 18)  SpO2: 99% (02 Mar 2019 05:27) (99% - 100%)        I&O's Detail    01 Mar 2019 07:01  -  02 Mar 2019 07:00  --------------------------------------------------------  IN:  Total IN: 0 mL    OUT:    Bulb: 10 mL    Voided: 500 mL  Total OUT: 510 mL    Total NET: -510 mL          Daily     Daily     LABS:                        7.3    12.23 )-----------( 196      ( 01 Mar 2019 05:00 )             22.4     03-01    138  |  103  |  5<L>  ----------------------------<  120<H>  3.6   |  24  |  0.45<L>    Ca    8.7      01 Mar 2019 05:00  Phos  1.8     03-01  Mg     1.8     03-01      Objective:  General: Appears well, NAD  Chest: Equal expansion bilaterally  Abdomen soft, nontender, nondistended, incision C/D/I with ostomy appliance around midline wound w/ minimal output some leaking at top of appliance, L -IR  w/ SS output  Extremities: Grossly symmetric, SCD's in place

## 2019-03-02 NOTE — PROGRESS NOTE ADULT - ASSESSMENT
54y Female PMHx HTN, T2DM, asthma, depression with recent diagnosis of invasive ampullary adenocarcinoma of the CBD admitted for cholangitis on 1/5, s/p ERCP 1/8, whipple 1/11, rapid response for GIB on floor 1/18 s/p MTP, RTOR s/p celiotomy, evacuation of hematoma, GDA bleed stopped and sutured, Abthera vac. Closure of abdomen with strattice mesh on 1/21, with inna drains and left lower JOURDAN drain in subcutaneous. Incisional bleed 1/23, bedside skin staples removed, JOURDAN drain removed, hemorrhage controlled, for continuation of ICU care. S/p interventional radiology drainage of LUQ collection 2/4. S/p completing closure w/ placing ostomy bag over fistula 2/18. Bcx and UA - NTD.      PLAN:  - regular diet   - pain control as needed  - cont meropenem, linezolid and change to Cipro 500mg q 12 and Flagyl 500mg q 12 for 1 week further after discharge   - d/c'ed octreotide on 2/26  - Ostomy teaching  - PT/oob  - lovenox for DVT ppx  - Dispo planning for today    D team [id number]

## 2019-03-03 RX ORDER — CIPROFLOXACIN LACTATE 400MG/40ML
500 VIAL (ML) INTRAVENOUS EVERY 12 HOURS
Qty: 0 | Refills: 0 | Status: DISCONTINUED | OUTPATIENT
Start: 2019-03-03 | End: 2019-03-05

## 2019-03-03 RX ORDER — METRONIDAZOLE 500 MG
500 TABLET ORAL EVERY 12 HOURS
Qty: 0 | Refills: 0 | Status: DISCONTINUED | OUTPATIENT
Start: 2019-03-03 | End: 2019-03-05

## 2019-03-03 RX ADMIN — Medication 500 MILLIGRAM(S): at 22:34

## 2019-03-03 RX ADMIN — ENOXAPARIN SODIUM 40 MILLIGRAM(S): 100 INJECTION SUBCUTANEOUS at 12:53

## 2019-03-03 RX ADMIN — GABAPENTIN 100 MILLIGRAM(S): 400 CAPSULE ORAL at 22:34

## 2019-03-03 RX ADMIN — MEROPENEM 100 MILLIGRAM(S): 1 INJECTION INTRAVENOUS at 15:01

## 2019-03-03 RX ADMIN — MONTELUKAST 10 MILLIGRAM(S): 4 TABLET, CHEWABLE ORAL at 12:53

## 2019-03-03 RX ADMIN — GABAPENTIN 100 MILLIGRAM(S): 400 CAPSULE ORAL at 15:01

## 2019-03-03 RX ADMIN — Medication 2: at 12:53

## 2019-03-03 RX ADMIN — ATORVASTATIN CALCIUM 10 MILLIGRAM(S): 80 TABLET, FILM COATED ORAL at 22:34

## 2019-03-03 RX ADMIN — GABAPENTIN 100 MILLIGRAM(S): 400 CAPSULE ORAL at 05:10

## 2019-03-03 RX ADMIN — PANTOPRAZOLE SODIUM 40 MILLIGRAM(S): 20 TABLET, DELAYED RELEASE ORAL at 05:10

## 2019-03-03 RX ADMIN — MEROPENEM 100 MILLIGRAM(S): 1 INJECTION INTRAVENOUS at 05:10

## 2019-03-03 NOTE — PROGRESS NOTE ADULT - SUBJECTIVE AND OBJECTIVE BOX
SURGERY DAILY PROGRESS NOTE:       SUBJECTIVE/ROS: Patient examined at bedside. No acute events overnight. Tolerates diet w/o N/V. +BM/+Flatus. OOB. Awaiting dispo.          MEDICATIONS  (STANDING):  artificial  tears Solution 1 Drop(s) Both EYES every 12 hours  atorvastatin 10 milliGRAM(s) Oral at bedtime  dextrose 5%. 1000 milliLiter(s) (50 mL/Hr) IV Continuous <Continuous>  dextrose 50% Injectable 12.5 Gram(s) IV Push once  dextrose 50% Injectable 25 Gram(s) IV Push once  dextrose 50% Injectable 25 Gram(s) IV Push once  enoxaparin Injectable 40 milliGRAM(s) SubCutaneous daily  gabapentin 100 milliGRAM(s) Oral three times a day  insulin lispro (HumaLOG) corrective regimen sliding scale   SubCutaneous three times a day before meals  insulin lispro (HumaLOG) corrective regimen sliding scale   SubCutaneous at bedtime  lidocaine 1% Injectable 30 milliLiter(s) Local Injection once  meropenem  IVPB 1000 milliGRAM(s) IV Intermittent every 8 hours  montelukast 10 milliGRAM(s) Oral daily  pantoprazole    Tablet 40 milliGRAM(s) Oral before breakfast    MEDICATIONS  (PRN):  acetaminophen   Tablet .. 650 milliGRAM(s) Oral every 6 hours PRN Mild Pain (1 - 3)  dextrose 40% Gel 15 Gram(s) Oral once PRN Blood Glucose LESS THAN 70 milliGRAM(s)/deciliter  glucagon  Injectable 1 milliGRAM(s) IntraMuscular once PRN Glucose LESS THAN 70 milligrams/deciliter  guaiFENesin    Syrup 100 milliGRAM(s) Oral every 6 hours PRN Cough      OBJECTIVE:    Vital Signs Last 24 Hrs  T(C): 36.9 (03 Mar 2019 05:09), Max: 37.2 (02 Mar 2019 20:59)  T(F): 98.4 (03 Mar 2019 05:09), Max: 99 (02 Mar 2019 20:59)  HR: 92 (03 Mar 2019 08:00) (92 - 114)  BP: 122/64 (03 Mar 2019 08:00) (113/63 - 128/69)  BP(mean): --  RR: 16 (03 Mar 2019 08:00) (16 - 19)  SpO2: 99% (03 Mar 2019 08:00) (97% - 100%)        I&O's Detail    02 Mar 2019 07:01  -  03 Mar 2019 07:00  --------------------------------------------------------  IN:  Total IN: 0 mL    OUT:    Voided: 650 mL  Total OUT: 650 mL    Total NET: -650 mL          Daily     Daily     LABS:        Objective:  General: Appears well, NAD  Chest: Equal expansion bilaterally  Abdomen soft, nontender, nondistended, incision C/D/I with ostomy appliance around midline wound w/ minimal output some leaking at top of appliance, L -IR  w/ SS output  Extremities: Grossly symmetric, SCD's in place

## 2019-03-03 NOTE — PROGRESS NOTE ADULT - ASSESSMENT
54y Female PMHx HTN, T2DM, asthma, depression with recent diagnosis of invasive ampullary adenocarcinoma of the CBD admitted for cholangitis on 1/5, s/p ERCP 1/8, whipple 1/11, rapid response for GIB on floor 1/18 s/p MTP, RTOR s/p celiotomy, evacuation of hematoma, GDA bleed stopped and sutured, Abthera vac. Closure of abdomen with strattice mesh on 1/21, with inna drains and left lower JOURDAN drain in subcutaneous. Incisional bleed 1/23, bedside skin staples removed, JOURDAN drain removed, hemorrhage controlled, for continuation of ICU care. S/p interventional radiology drainage of LUQ collection 2/4. S/p completing closure w/ placing ostomy bag over fistula 2/18. Bcx and UA - NTD.      PLAN:  - regular diet   - pain control as needed  - cont meropenem, linezolid and change to Cipro 500mg q 12 and Flagyl 500mg q 12 for 1 week further after discharge   - d/c'ed octreotide on 2/26  - Ostomy teaching  - PT/oob  - lovenox for DVT ppx  - Dispo planning - has not been accepted by nursing care agency as of today     D team [id number]

## 2019-03-04 PROCEDURE — 99231 SBSQ HOSP IP/OBS SF/LOW 25: CPT

## 2019-03-04 PROCEDURE — 99232 SBSQ HOSP IP/OBS MODERATE 35: CPT

## 2019-03-04 RX ADMIN — Medication 1 DROP(S): at 17:25

## 2019-03-04 RX ADMIN — GABAPENTIN 100 MILLIGRAM(S): 400 CAPSULE ORAL at 21:29

## 2019-03-04 RX ADMIN — Medication 4: at 12:30

## 2019-03-04 RX ADMIN — ATORVASTATIN CALCIUM 10 MILLIGRAM(S): 80 TABLET, FILM COATED ORAL at 21:29

## 2019-03-04 RX ADMIN — GABAPENTIN 100 MILLIGRAM(S): 400 CAPSULE ORAL at 06:12

## 2019-03-04 RX ADMIN — ENOXAPARIN SODIUM 40 MILLIGRAM(S): 100 INJECTION SUBCUTANEOUS at 12:23

## 2019-03-04 RX ADMIN — Medication 500 MILLIGRAM(S): at 21:29

## 2019-03-04 RX ADMIN — MONTELUKAST 10 MILLIGRAM(S): 4 TABLET, CHEWABLE ORAL at 12:23

## 2019-03-04 RX ADMIN — Medication 500 MILLIGRAM(S): at 10:53

## 2019-03-04 RX ADMIN — GABAPENTIN 100 MILLIGRAM(S): 400 CAPSULE ORAL at 13:44

## 2019-03-04 RX ADMIN — PANTOPRAZOLE SODIUM 40 MILLIGRAM(S): 20 TABLET, DELAYED RELEASE ORAL at 06:11

## 2019-03-04 RX ADMIN — Medication 500 MILLIGRAM(S): at 11:55

## 2019-03-04 NOTE — PROGRESS NOTE ADULT - ASSESSMENT
54y Female PMHx HTN, T2DM, asthma, depression with recent diagnosis of invasive ampullary adenocarcinoma of the CBD admitted for cholangitis on 1/5, s/p ERCP 1/8, whipple 1/11, rapid response for GIB on floor 1/18 s/p MTP, RTOR s/p celiotomy, evacuation of hematoma, GDA bleed stopped and sutured, Abthera vac. Closure of abdomen with strattice mesh on 1/21, with inna drains and left lower JOURDAN drain in subcutaneous. Incisional bleed 1/23, bedside skin staples removed, JOURDAN drain removed, hemorrhage controlled, for continuation of ICU care. S/p interventional radiology drainage of LUQ collection 2/4. S/p completing closure w/ placing ostomy bag over fistula 2/18. Bcx and UA - NTD.      PLAN:  - regular diet   - pain control as needed  - cont Cipro 500mg q 12 and Flagyl 500mg q 12 till 3/7/19.   - PT/oob  - lovenox for DVT ppx  - Dispo planning - has not been accepted by nursing care agency as of today     D team [id number]

## 2019-03-04 NOTE — PROGRESS NOTE ADULT - SUBJECTIVE AND OBJECTIVE BOX
54y Female s/p placement of 3 IR drains on 2/12/19 now with only LLQ hematoma drain.     Patient seen and examined bedside resting comfortably. No complaints offered. Daughter at bedside.    T(F): 97.9 (03-04-19 @ 09:25), Max: 98.9 (03-04-19 @ 06:10)  HR: 90 (03-04-19 @ 09:25) (90 - 101)  BP: 105/57 (03-04-19 @ 09:25) (105/57 - 123/66)  RR: 17 (03-04-19 @ 09:25) (17 - 18)  SpO2: 100% (03-04-19 @ 09:25) (97% - 100%)  Wt(kg): --    I&O's Detail    03 Mar 2019 07:01  -  04 Mar 2019 07:00  --------------------------------------------------------  IN:  Total IN: 0 mL    OUT:    Bulb: 17 mL    Drain: 20 mL, brown    Voided: 450 mL  Total OUT: 487 mL    Total NET: -487 mL      04 Mar 2019 07:01  -  04 Mar 2019 13:22  --------------------------------------------------------  IN:    Oral Fluid: 220 mL  Total IN: 220 mL    OUT:    Bulb: 20 mL    Drain: 25 mL    Voided: 300 mL  Total OUT: 345 mL    Total NET: -125 mL    PHYSICAL EXAM:  General: Nontoxic, in NAD  Neuro:  Alert & oriented x 3  CV: +S1+S2 regular rate and rhythm  Lung: clear to ausculation bilaterally, respirations nonlabored, good inspiratory effort  Abdomen: soft, NTND. Normactive BS. LLQ drain c/d/i with brown, old bloody output  Extremities: no pedal edema or calf tenderness noted     Impression: 54y Female PMHx HTN, T2DM, asthma, depression with recent diagnosis of invasive ampullary adenocarcinoma of the CBD admitted for cholangitis on 1/5, s/p ERCP 1/8, whipple 1/11, rapid response for GIB on floor 1/18 s/p MTP, RTOR s/p celiotomy, evacuation of hematoma, S/p 3 IR drains, 2 removed, now with only LLQ hematoma drain in place.  - Can be discharged home with drain  - Noncontrast CT + IR tube check arranged as outpatient 3/11/19 at 11:00 am  - Outpatient IR office [phone] for any questions    [id number]

## 2019-03-04 NOTE — PROGRESS NOTE ADULT - SUBJECTIVE AND OBJECTIVE BOX
CC: F/U abscess    Saw/spoke to patient. No fevers, no chills. Overall well. Changed to PO abx.    Allergies  No Known Allergies    ANTIMICROBIALS:  ciprofloxacin     Tablet 500 every 12 hours  metroNIDAZOLE    Tablet 500 every 12 hours    PE:    Vital Signs Last 24 Hrs  T(C): 36.6 (04 Mar 2019 09:25), Max: 37.2 (04 Mar 2019 06:10)  T(F): 97.9 (04 Mar 2019 09:25), Max: 98.9 (04 Mar 2019 06:10)  HR: 90 (04 Mar 2019 09:25) (90 - 101)  BP: 105/57 (04 Mar 2019 09:25) (105/57 - 123/66)  RR: 17 (04 Mar 2019 09:25) (17 - 18)  SpO2: 100% (04 Mar 2019 09:25) (97% - 100%)    Gen: AOx3, NAD, non-toxic, pleasant  CV: S1+S2 normal, nontachycardic  Resp: Clear bilat, no resp distress, no crackles/wheezes  Abd: Soft, nontender, +BS  Ext: No LE edema, no wounds    LABS:    No new available    MICROBIOLOGY:    BLOOD VENOUS  02-21-19 NGTD    ABSCESS  02-12-19 --  --  Escherichia coli  Enterococcus faecalis VRE    ABSCESS  02-12-19 --  --  Escherichia coli  Enterobacter cloacae    RADIOLOGY:    2/28 CT:    IMPRESSION:  Tube check revealing resolution of the pelvic drain. The pelvic drain was   removed  Persistent cavity along the left lower quadrant hematoma drain. TPA was   injected into the collection for 30 minutes yielding an additional 20 cc   of liquefied hematoma.

## 2019-03-04 NOTE — PROGRESS NOTE ADULT - SUBJECTIVE AND OBJECTIVE BOX
SURGERY DAILY PROGRESS NOTE:       SUBJECTIVE/ROS: Patient examined at bedside. No acute events overnight. Tolerates diet w/o N/V. +BM/+Flatus. Started on PO Abx last night. Awaiting discharge.         MEDICATIONS  (STANDING):  artificial  tears Solution 1 Drop(s) Both EYES every 12 hours  atorvastatin 10 milliGRAM(s) Oral at bedtime  ciprofloxacin     Tablet 500 milliGRAM(s) Oral every 12 hours  dextrose 5%. 1000 milliLiter(s) (50 mL/Hr) IV Continuous <Continuous>  dextrose 50% Injectable 12.5 Gram(s) IV Push once  dextrose 50% Injectable 25 Gram(s) IV Push once  dextrose 50% Injectable 25 Gram(s) IV Push once  enoxaparin Injectable 40 milliGRAM(s) SubCutaneous daily  gabapentin 100 milliGRAM(s) Oral three times a day  insulin lispro (HumaLOG) corrective regimen sliding scale   SubCutaneous three times a day before meals  insulin lispro (HumaLOG) corrective regimen sliding scale   SubCutaneous at bedtime  lidocaine 1% Injectable 30 milliLiter(s) Local Injection once  metroNIDAZOLE    Tablet 500 milliGRAM(s) Oral every 12 hours  montelukast 10 milliGRAM(s) Oral daily  pantoprazole    Tablet 40 milliGRAM(s) Oral before breakfast    MEDICATIONS  (PRN):  acetaminophen   Tablet .. 650 milliGRAM(s) Oral every 6 hours PRN Mild Pain (1 - 3)  dextrose 40% Gel 15 Gram(s) Oral once PRN Blood Glucose LESS THAN 70 milliGRAM(s)/deciliter  glucagon  Injectable 1 milliGRAM(s) IntraMuscular once PRN Glucose LESS THAN 70 milligrams/deciliter  guaiFENesin    Syrup 100 milliGRAM(s) Oral every 6 hours PRN Cough      OBJECTIVE:    Vital Signs Last 24 Hrs  T(C): 36.9 (04 Mar 2019 01:00), Max: 36.9 (03 Mar 2019 17:24)  T(F): 98.4 (04 Mar 2019 01:00), Max: 98.5 (03 Mar 2019 17:24)  HR: 101 (04 Mar 2019 01:00) (88 - 101)  BP: 119/60 (04 Mar 2019 01:00) (108/60 - 122/64)  BP(mean): --  RR: 17 (04 Mar 2019 01:00) (16 - 18)  SpO2: 97% (04 Mar 2019 01:00) (97% - 100%)        I&O's Detail    02 Mar 2019 07:01  -  03 Mar 2019 07:00  --------------------------------------------------------  IN:  Total IN: 0 mL    OUT:    Voided: 650 mL  Total OUT: 650 mL    Total NET: -650 mL      03 Mar 2019 07:01  -  04 Mar 2019 05:43  --------------------------------------------------------  IN:  Total IN: 0 mL    OUT:    Bulb: 17 mL    Drain: 20 mL    Voided: 450 mL  Total OUT: 487 mL    Total NET: -487 mL          Daily     Daily     LABS:                Objective:  General: Appears well, NAD  Chest: Equal expansion bilaterally  Abdomen soft, nontender, nondistended, incision C/D/I with ostomy appliance around midline wound w/ minimal output some leaking at top of appliance, L -IR  w/ SS output  Extremities: Grossly symmetric, SCD's in place

## 2019-03-04 NOTE — PROGRESS NOTE ADULT - ASSESSMENT
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  Fever, leukocytosis  Prolonged hospital course with cholangitis; Whipples on 1/11; GIB; abd closure with mesh  Then found to have to have multiple abdominal collections  2/4 abd cultures neg  2/12 abd cultures now with E coli, Enterobacter, Enterococcus (VRE)  CT A/P with multiple abdominal collections  Seems improving--no fevers, WBC stable  S/p IR tube check 2/10, 2/28  New CT A/P with reassuring pelvic drain, but persistent cavity at other drain (reviewed IR note)  S/p 7 day course for VRE  Afeb x 24, WBC stable, improved; remains well today  Switched to PO abx yesterday evening  Overall, fever, leukocytosis, abd abscess, post operative state, positive culture finding  - Cipro/Flagyl  - Drain management per IR  - When DC planning, would plan to continue Cipro 500mg q 12 and Flagyl 500mg q 12 through 3/6/19  - Trend WBC  - Will sign off. Please call with further questions or change in status.    Manny Olivera MD  Pager [phone]  After 5pm and on weekends call [phone]

## 2019-03-05 VITALS
DIASTOLIC BLOOD PRESSURE: 57 MMHG | SYSTOLIC BLOOD PRESSURE: 115 MMHG | RESPIRATION RATE: 16 BRPM | TEMPERATURE: 98 F | HEART RATE: 78 BPM | OXYGEN SATURATION: 98 %

## 2019-03-05 PROCEDURE — 99232 SBSQ HOSP IP/OBS MODERATE 35: CPT

## 2019-03-05 RX ORDER — SODIUM CHLORIDE 9 MG/ML
1 INJECTION INTRAMUSCULAR; INTRAVENOUS; SUBCUTANEOUS
Qty: 30 | Refills: 1 | OUTPATIENT
Start: 2019-03-05

## 2019-03-05 RX ORDER — DOCUSATE SODIUM 100 MG
1 CAPSULE ORAL
Qty: 0 | Refills: 0 | COMMUNITY

## 2019-03-05 RX ORDER — CIPROFLOXACIN LACTATE 400MG/40ML
1 VIAL (ML) INTRAVENOUS
Qty: 4 | Refills: 0 | OUTPATIENT
Start: 2019-03-05 | End: 2019-03-06

## 2019-03-05 RX ORDER — METRONIDAZOLE 500 MG
1 TABLET ORAL
Qty: 6 | Refills: 0 | OUTPATIENT
Start: 2019-03-05 | End: 2019-03-06

## 2019-03-05 RX ADMIN — ENOXAPARIN SODIUM 40 MILLIGRAM(S): 100 INJECTION SUBCUTANEOUS at 12:52

## 2019-03-05 RX ADMIN — Medication 2: at 12:51

## 2019-03-05 RX ADMIN — Medication 500 MILLIGRAM(S): at 12:52

## 2019-03-05 RX ADMIN — PANTOPRAZOLE SODIUM 40 MILLIGRAM(S): 20 TABLET, DELAYED RELEASE ORAL at 06:39

## 2019-03-05 RX ADMIN — GABAPENTIN 100 MILLIGRAM(S): 400 CAPSULE ORAL at 06:39

## 2019-03-05 RX ADMIN — GABAPENTIN 100 MILLIGRAM(S): 400 CAPSULE ORAL at 12:52

## 2019-03-05 RX ADMIN — MONTELUKAST 10 MILLIGRAM(S): 4 TABLET, CHEWABLE ORAL at 12:52

## 2019-03-05 NOTE — PROGRESS NOTE ADULT - ASSESSMENT
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  Fever, leukocytosis  Prolonged hospital course with cholangitis; Whipples on 1/11; GIB; abd closure with mesh  Then found to have to have multiple abdominal collections  2/4 abd cultures neg  2/12 abd cultures now with E coli, Enterobacter, Enterococcus (VRE)  CT A/P with multiple abdominal collections  Seems improving--no fevers, WBC stable  S/p IR tube check 2/10, 2/28  New CT A/P with reassuring pelvic drain, but persistent cavity at other drain (reviewed IR note)  S/p 7 day course for VRE  Low grade fever, asymptomatic, no new focal complaints--monitor  Overall, fever, leukocytosis, abd abscess, post operative state, positive culture finding  - Cipro/Flagyl  - Drain management per IR  - When DC planning, would plan to continue Cipro 500mg q 12 and Flagyl 500mg q 12 through 3/6/19  - Monitor for further fevers, trend WBC    Manny Olivera MD  Pager [phone]  After 5pm and on weekends call [phone]

## 2019-03-05 NOTE — PROGRESS NOTE ADULT - SUBJECTIVE AND OBJECTIVE BOX
GENERAL SURGERY DAILY PROGRESS NOTE:     Subjective:  Pt seen and examined. No acute events overnight. Dispo planning. Tolerating diet.     Objective:  General: Appears well, NAD  Chest: Equal expansion bilaterally  Abdomen soft, nontender, nondistended, incision C/D/I with ostomy appliance around midline wound w/ minimal output some leaking at top of appliance, L -IR  w/ SS output  Extremities: Grossly symmetric, SCD's in place     MEDICATIONS  (STANDING):  artificial  tears Solution 1 Drop(s) Both EYES every 12 hours  atorvastatin 10 milliGRAM(s) Oral at bedtime  ciprofloxacin     Tablet 500 milliGRAM(s) Oral every 12 hours  dextrose 5%. 1000 milliLiter(s) (50 mL/Hr) IV Continuous <Continuous>  dextrose 50% Injectable 12.5 Gram(s) IV Push once  dextrose 50% Injectable 25 Gram(s) IV Push once  dextrose 50% Injectable 25 Gram(s) IV Push once  enoxaparin Injectable 40 milliGRAM(s) SubCutaneous daily  gabapentin 100 milliGRAM(s) Oral three times a day  insulin lispro (HumaLOG) corrective regimen sliding scale   SubCutaneous three times a day before meals  insulin lispro (HumaLOG) corrective regimen sliding scale   SubCutaneous at bedtime  lidocaine 1% Injectable 30 milliLiter(s) Local Injection once  metroNIDAZOLE    Tablet 500 milliGRAM(s) Oral every 12 hours  montelukast 10 milliGRAM(s) Oral daily  pantoprazole    Tablet 40 milliGRAM(s) Oral before breakfast    MEDICATIONS  (PRN):  acetaminophen   Tablet .. 650 milliGRAM(s) Oral every 6 hours PRN Mild Pain (1 - 3)  dextrose 40% Gel 15 Gram(s) Oral once PRN Blood Glucose LESS THAN 70 milliGRAM(s)/deciliter  glucagon  Injectable 1 milliGRAM(s) IntraMuscular once PRN Glucose LESS THAN 70 milligrams/deciliter  guaiFENesin    Syrup 100 milliGRAM(s) Oral every 6 hours PRN Cough      Vital Signs Last 24 Hrs  T(C): 37.3 (05 Mar 2019 00:31), Max: 38 (04 Mar 2019 21:11)  T(F): 99.2 (05 Mar 2019 00:31), Max: 100.4 (04 Mar 2019 21:11)  HR: 92 (05 Mar 2019 00:31) (89 - 103)  BP: 119/62 (05 Mar 2019 00:31) (105/57 - 123/66)  BP(mean): --  RR: 18 (05 Mar 2019 00:31) (17 - 18)  SpO2: 98% (05 Mar 2019 00:31) (98% - 100%)    I&O's Detail    03 Mar 2019 07:01  -  04 Mar 2019 07:00  --------------------------------------------------------  IN:  Total IN: 0 mL    OUT:    Bulb: 17 mL    Drain: 20 mL    Voided: 450 mL  Total OUT: 487 mL    Total NET: -487 mL      04 Mar 2019 07:01  -  05 Mar 2019 03:51  --------------------------------------------------------  IN:    Oral Fluid: 320 mL  Total IN: 320 mL    OUT:    Bulb: 35 mL    Drain: 60 mL    Voided: 1000 mL  Total OUT: 1095 mL    Total NET: -775 mL          Daily     Daily     LABS:                RADIOLOGY & ADDITIONAL STUDIES:

## 2019-03-05 NOTE — PROGRESS NOTE ADULT - ASSESSMENT
54y Female PMHx HTN, T2DM, asthma, depression with recent diagnosis of invasive ampullary adenocarcinoma of the CBD admitted for cholangitis on 1/5, s/p ERCP 1/8, whipple 1/11, rapid response for GIB on floor 1/18 s/p MTP, RTOR s/p celiotomy, evacuation of hematoma, GDA bleed stopped and sutured, Abthera vac. Closure of abdomen with strattice mesh on 1/21, with inna drains and left lower JOURDAN drain in subcutaneous. Incisional bleed 1/23, bedside skin staples removed, JOURDAN drain removed, hemorrhage controlled, for continuation of ICU care. S/p interventional radiology drainage of LUQ collection 2/4. S/p completing closure w/ placing ostomy bag over fistula 2/18. Bcx and UA - NTD.      PLAN:  - regular diet   - pain control as needed  - cont Cipro 500mg q 12 and Flagyl 500mg q 12 till 3/7/19.   - PT/oob  - lovenox for DVT ppx  - Dispo planning - has not been accepted by nursing care agency as of today, case has been escalated    D team [id number]

## 2019-03-05 NOTE — PROGRESS NOTE ADULT - SUBJECTIVE AND OBJECTIVE BOX
CC: F/U for fever    Saw/spoke to patient. Low grade fever. Patient was unaware of fever, no chills, no symptoms. Doing well otherwise.    Allergies  No Known Allergies    ANTIMICROBIALS:  ciprofloxacin     Tablet 500 every 12 hours  metroNIDAZOLE    Tablet 500 every 12 hours    PE:    Vital Signs Last 24 Hrs  T(C): 36.7 (05 Mar 2019 08:02), Max: 38 (04 Mar 2019 21:11)  T(F): 98.1 (05 Mar 2019 08:02), Max: 100.4 (04 Mar 2019 21:11)  HR: 85 (05 Mar 2019 08:02) (85 - 103)  BP: 117/64 (05 Mar 2019 08:02) (115/65 - 119/62)  RR: 16 (05 Mar 2019 08:02) (16 - 18)  SpO2: 100% (05 Mar 2019 08:02) (98% - 100%)    Gen: AOx3, NAD, non-toxic, pleasant  CV: S1+S2 normal, nontachycardic  Resp: Clear bilat, no resp distress, no crackles/wheezes  Abd: Soft, nontender, +BS  Ext: No LE edema, no wounds    LABS:    (No new available)    MICROBIOLOGY:    BLOOD VENOUS  02-21-19 NGTD    ABSCESS  02-12-19 --  --  Escherichia coli  Enterococcus faecalis VRE    ABSCESS  02-12-19 --  --  Escherichia coli  Enterobacter cloacae    (otherwise reviewed)    RADIOLOGY:    2/28 CXR:    FINDINGS:     Cardiac silhouette is not accurately evaluated in this projection.  Unchanged left pleural effusion with associated passive atelectasis.  No pneumothorax .  Partially imaged left abdominal pigtail catheter.    IMPRESSION:    Unchanged left pleural effusion with associated passive   atelectasis/consolidation.

## 2019-03-05 NOTE — PROGRESS NOTE ADULT - REASON FOR ADMISSION
cholangitis
Fever and abdominal pain p/w cholangitis
cholangitis

## 2019-03-08 LAB — FUNGUS SPEC QL CULT: SIGNIFICANT CHANGE UP

## 2019-03-10 ENCOUNTER — FORM ENCOUNTER (OUTPATIENT)
Age: 54
End: 2019-03-10

## 2019-03-11 ENCOUNTER — APPOINTMENT (OUTPATIENT)
Dept: CT IMAGING | Facility: HOSPITAL | Age: 54
End: 2019-03-11

## 2019-03-11 ENCOUNTER — CHART COPY (OUTPATIENT)
Age: 54
End: 2019-03-11

## 2019-03-11 ENCOUNTER — OUTPATIENT (OUTPATIENT)
Dept: OUTPATIENT SERVICES | Facility: HOSPITAL | Age: 54
LOS: 1 days | End: 2019-03-11
Payer: MEDICAID

## 2019-03-11 DIAGNOSIS — T14.8XXA OTHER INJURY OF UNSPECIFIED BODY REGION, INITIAL ENCOUNTER: ICD-10-CM

## 2019-03-11 PROCEDURE — 74150 CT ABDOMEN W/O CONTRAST: CPT | Mod: 26

## 2019-03-11 PROCEDURE — 49424 ASSESS CYST CONTRAST INJECT: CPT

## 2019-03-11 PROCEDURE — 76080 X-RAY EXAM OF FISTULA: CPT | Mod: 26

## 2019-03-12 ENCOUNTER — APPOINTMENT (OUTPATIENT)
Dept: SURGICAL ONCOLOGY | Facility: CLINIC | Age: 54
End: 2019-03-12
Payer: MEDICAID

## 2019-03-12 VITALS
BODY MASS INDEX: 20.62 KG/M2 | SYSTOLIC BLOOD PRESSURE: 122 MMHG | WEIGHT: 105 LBS | HEART RATE: 91 BPM | HEIGHT: 60 IN | DIASTOLIC BLOOD PRESSURE: 93 MMHG | RESPIRATION RATE: 15 BRPM

## 2019-03-12 DIAGNOSIS — Z78.9 OTHER SPECIFIED HEALTH STATUS: ICD-10-CM

## 2019-03-12 PROCEDURE — 99024 POSTOP FOLLOW-UP VISIT: CPT

## 2019-03-13 LAB
FUNGUS SPEC QL CULT: SIGNIFICANT CHANGE UP
FUNGUS SPEC QL CULT: SIGNIFICANT CHANGE UP

## 2019-03-14 ENCOUNTER — RESULT REVIEW (OUTPATIENT)
Age: 54
End: 2019-03-14

## 2019-03-14 ENCOUNTER — OUTPATIENT (OUTPATIENT)
Dept: OUTPATIENT SERVICES | Facility: HOSPITAL | Age: 54
LOS: 1 days | Discharge: HOME | End: 2019-03-14

## 2019-03-14 ENCOUNTER — APPOINTMENT (OUTPATIENT)
Age: 54
End: 2019-03-14
Payer: MEDICAID

## 2019-03-14 VITALS
HEART RATE: 85 BPM | RESPIRATION RATE: 14 BRPM | WEIGHT: 105.82 LBS | SYSTOLIC BLOOD PRESSURE: 120 MMHG | TEMPERATURE: 98.2 F | BODY MASS INDEX: 20.78 KG/M2 | DIASTOLIC BLOOD PRESSURE: 72 MMHG | OXYGEN SATURATION: 95 % | HEIGHT: 59.84 IN

## 2019-03-14 DIAGNOSIS — Z86.39 PERSONAL HISTORY OF OTHER ENDOCRINE, NUTRITIONAL AND METABOLIC DISEASE: ICD-10-CM

## 2019-03-14 DIAGNOSIS — C24.1 MALIGNANT NEOPLASM OF AMPULLA OF VATER: ICD-10-CM

## 2019-03-14 LAB
ALBUMIN SERPL ELPH-MCNC: 3.4 G/DL
ALP BLD-CCNC: 382 U/L
ALT SERPL-CCNC: 14 U/L
ANION GAP SERPL CALC-SCNC: 13 MMOL/L
APTT BLD: 31.8 SEC
AST SERPL-CCNC: 34 U/L
BASOPHILS # BLD AUTO: 0.1 K/UL — SIGNIFICANT CHANGE UP (ref 0–0.2)
BASOPHILS NFR BLD AUTO: 0.8 % — SIGNIFICANT CHANGE UP (ref 0–2)
BILIRUB SERPL-MCNC: 0.8 MG/DL
BUN SERPL-MCNC: 7 MG/DL
CALCIUM SERPL-MCNC: 9.3 MG/DL
CANCER AG19-9 SERPL-ACNC: 42 U/ML
CEA SERPL-MCNC: 2 NG/ML
CHLORIDE SERPL-SCNC: 101 MMOL/L
CO2 SERPL-SCNC: 27 MMOL/L
CREAT SERPL-MCNC: 0.38 MG/DL
EOSINOPHIL # BLD AUTO: 0.5 K/UL — SIGNIFICANT CHANGE UP (ref 0–0.5)
EOSINOPHIL NFR BLD AUTO: 5.1 % — SIGNIFICANT CHANGE UP (ref 0–6)
GLUCOSE SERPL-MCNC: 139 MG/DL
HBV CORE IGG+IGM SER QL: NONREACTIVE
HBV SURFACE AG SER QL: NONREACTIVE
HCT VFR BLD CALC: 25.9 % — LOW (ref 34.5–45)
HCV AB SER QL: NONREACTIVE
HCV S/CO RATIO: 0.33 S/CO
HGB BLD-MCNC: 9 G/DL — LOW (ref 11.5–15.5)
INR PPP: 1.16 RATIO
LYMPHOCYTES # BLD AUTO: 2.8 K/UL — SIGNIFICANT CHANGE UP (ref 1–3.3)
LYMPHOCYTES # BLD AUTO: 29.8 % — SIGNIFICANT CHANGE UP (ref 13–44)
MCHC RBC-ENTMCNC: 29.2 PG — SIGNIFICANT CHANGE UP (ref 27–34)
MCHC RBC-ENTMCNC: 34.6 G/DL — SIGNIFICANT CHANGE UP (ref 32–36)
MCV RBC AUTO: 84.6 FL — SIGNIFICANT CHANGE UP (ref 80–100)
MONOCYTES # BLD AUTO: 0.8 K/UL — SIGNIFICANT CHANGE UP (ref 0–0.9)
MONOCYTES NFR BLD AUTO: 8.8 % — SIGNIFICANT CHANGE UP (ref 2–14)
NEUTROPHILS # BLD AUTO: 5.2 K/UL — SIGNIFICANT CHANGE UP (ref 1.8–7.4)
NEUTROPHILS NFR BLD AUTO: 55.6 % — SIGNIFICANT CHANGE UP (ref 43–77)
PLATELET # BLD AUTO: 298 K/UL — SIGNIFICANT CHANGE UP (ref 150–400)
POTASSIUM SERPL-SCNC: 3.8 MMOL/L
PROT SERPL-MCNC: 7.3 G/DL
PT BLD: 13.2 SEC
RBC # BLD: 3.07 M/UL — LOW (ref 3.8–5.2)
RBC # FLD: 14.7 % — HIGH (ref 10.3–14.5)
SODIUM SERPL-SCNC: 141 MMOL/L
WBC # BLD: 9.4 K/UL — SIGNIFICANT CHANGE UP (ref 3.8–10.5)
WBC # FLD AUTO: 9.4 K/UL — SIGNIFICANT CHANGE UP (ref 3.8–10.5)

## 2019-03-14 PROCEDURE — 99215 OFFICE O/P EST HI 40 MIN: CPT

## 2019-03-14 RX ORDER — DULOXETINE HYDROCHLORIDE 60 MG/1
60 CAPSULE, DELAYED RELEASE PELLETS ORAL
Qty: 30 | Refills: 3 | Status: DISCONTINUED | COMMUNITY
Start: 2019-03-14 | End: 2019-03-14

## 2019-03-15 ENCOUNTER — OTHER (OUTPATIENT)
Age: 54
End: 2019-03-15

## 2019-03-15 DIAGNOSIS — K65.1 PERITONEAL ABSCESS: ICD-10-CM

## 2019-03-15 DIAGNOSIS — Z43.4 ENCOUNTER FOR ATTENTION TO OTHER ARTIFICIAL OPENINGS OF DIGESTIVE TRACT: ICD-10-CM

## 2019-03-15 NOTE — ASSESSMENT
[FreeTextEntry1] : 54 year old woman s/p Whipple procedure for T3N1 (1/24 lymph nodes positive) ampullary adenocarcinoma, complicated by GDA bleed RTOR for oversewing of vessel, abdominal wall bleed requiring opening of the incision and secondary closure with Dermaclose, developed low output ECF\par \par Plan:\par 1) Med onc referral\par 2) Increase PO intake and activity\par 3) Wound care\par 4) RTO next week for wound check

## 2019-03-15 NOTE — PHYSICAL EXAM
[Normal] : supple, no neck mass and thyroid not enlarged [Normal Neck Lymph Nodes] : normal neck lymph nodes  [Normal Supraclavicular Lymph Nodes] : normal supraclavicular lymph nodes [Normal Groin Lymph Nodes] : normal groin lymph nodes [Normal Axillary Lymph Nodes] : normal axillary lymph nodes [Normal] : oriented to person, place and time, with appropriate affect [de-identified] : soft NT ND; incision with open area at superior aspect with ostomy appliance - ~20 cc of enteric contents ; nylon sutures removed

## 2019-03-15 NOTE — HISTORY OF PRESENT ILLNESS
[de-identified] : 54 year old female presents for an initial post op visit/post hospitalization visit. \par \par - HOSPITAL COURSE- Hospitalized at [facility] from 1/5/19-3/1/19\par 53 y.o. 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 Patient had acute onset of RUQ pain yesterday, with no inciting factors or relief with aleve. The patient's family also endorsed 1-2 episodes of NBNB emesis yesterday and has had decreased PO intake over the last 36hours. As of diagnosis of cancer, the patient has not had follow up with oncology or surg-oncology due insurance issues. The patient has an appointment with [facility] on Wednesday for surgical evaluation. In the ED, patient febrile to 101.5, HR 98, BPs decreased to 98/41, found with leukocytosis to 15.2, lactate 4.4. She was found with RUQ pain, fever, and jaundice (Bilirubin 3.9) consistent with acute cholangitis. CT A/P revealed Hyperattenuation and mild wall thickening involving the CBD raises the possibility of cholangitis with pneumobilia related to recent ERCP. In the ED, patient received IVF, vanc/zosyn, morphine, and zofran. Patient admitted with severe sepsis 2/2 acute cholangitis. MICU was consulted for hypotension, but this improved s/p fluids and IV antibiotics and patient did not warrant MICU level care. GI was consulted and recommended ERCP. The patient was maintained on zosyn for IV antibiotics. Patient remained stable on zosyn, white count trended down, lactate improved, and fevers were resolving. BCs ultimately grew E.Coli. 1/7: ERCP performed. Stent was removed from the biliary tree. One plastic stent was placed into the common bile duct. The major papilla appeared to have a mass.Oncology was consulted given patient's poor outpatient oncology follow up due to insurance issues, they recommended CT scan and  MRI abdomen/pelvis with contrast for continued staging of cancer workup. \par \par 1/8/19 CT A/P - showed Small bilateral pleural effusions with bibasilar atelectasis. No lung nodules. Mildly enlarged right cardiophrenic angle lymph node is unchanged since December 18, 2018 is indeterminate\par \par 1/8/19  MRI-  IMPRESSION: *  No suspicious liver lesions. No abnormality is identified to correspond with questionable lesion identified on prior CT. * Intra and extrahepatic biliary ductal dilation, with mural enhancement which may be seen in the setting of cholangitis. *  Cholelithiasis with gallbladder wall thickening.Pt  transferred to surgical oncology service and went to the OR on 1/11/19. \par \par  1/11/19- S/p ex-lap, Whipple pancreaticoduodenectomy for duodenal adenocarcinoma. \par \par FINAL PATHOLOGY:\par -Invasive adenocarcinoma of the duodenal ampulla (Tumor size: 2.5 x 1.5 x 0.8 cm); Tumor stage: pT3b pN1\par -Vascular and perineural invasion identified\par -1/24 lymph nodes involved by adenocarcinoma\par -Negative margins\par -S/p cholecystectomy ; acute cholecystitis with cholelithiasis \par -Bile duct margin with acute inflammation and focal atypia consistent with low grade dysplasia. \par \par Intraop findings: pylorus sparing Whipple.  Pt tolerated procedure well, without complication.\par \par On 1/18/19, patient was a surgical rapid response for which pt transferred to SICU. Patient was febrile to 102. Cultures sent. Antibiotics changed from zosyn to meropenem. Patient was tachypneic and hypotensive to 70s systolic. CBC revealed drop in H/H to 6/19 with elevated lactate of 9. Decision was made to intubate patient. Massive Transfusion protocol initiated. Patient received 4PRBC 5FFP and 1platelet, and bolus 1L of fluid. Surgical team and attending notified when Peak pressures on vent were 50s and increased abdominal distention. Decision was then made to take patient emergently to OR. Patient had evacuation of hematoma, cessation of gastroduodenal artery bleeding with two hemostatic stitches, and placement of abthera VAC. Pt transferred to SICU post op. Patient hypotensive post-op with low UOP. 2 L bolus given.  CBC remained stable. Patient hypotensive to 80s systolic, [place] started and increased, patient was then transitioned to levo. Lactate downtrended to 7.6 and H/H stable. \par \par On 1/19 pt cont to require pressors, resuscitated with IVF, and had drop H/H for which transfused. On 1/21/19, Pt febrile to 100.6 overnight, cultures sent, UA+ nitrite, follow up urine cultures. \par \par Pt RTOR on 1/21/19 for Exploratory laparotomy, drainage of abdominal abscess, repair of abdominal wall hernia with Strattice mesh, b/ abdominal wall advancement flaps. Post-op patient remained intubated and  off vasopressors. FENA calculated to be pre-renal. 500CC albumin bolus given, urine output improved. 1/22/19 attempted spontaneous breathing trials but pt hypoxic. Pt lactate uptrending, resuscitated w/IVF, and pt hypotensive requiring pressors. UCx growing candida, Diflucan changed to micafungin to r/o resistant organisms in the setting of worsening sepsis. On 1/23/19, patient noted to have blood oozing from \par midline incision while hypotensive and on pressors.  Peak pressures noted to be elevated on vent and abdomen increasingly distended.  All staples removed and a large amount of clot evacuated from midline incision. 1U PRBC given. No active bleeding appreciated.  Fascia remains closed.  Wound packed with wet to dry dressing.  Vitamin K given for elevated INR.  1/24/19 nutrition consulted and pt. started on TPN. Plastic consulted bc midline opened for hematoma evacuation yesterday, external tissue expander device (Dermaclose) applied to the wound. Patient hypotensive requiring reinitiation of low dose vasopressors. On 1/25/19, sedatives and pressors were decreased. Pt was volume overloaded and was given Albumin, Lasix, and a Bumex drip. O/N, pt febrile 102, given tylenol. mildly hypotensive SBP 80, started on small dose levo. 1/27 Cont SBT. Decision to continue diuresis with Diamox 1/28 Patient febrile to 101 today. Diuresing with lasix/bumex, giving albumin. Continued SBTs.  On 1/29/19, patient tolerating CPAP. Patient extubated without issue. NGT was removed. Patient started on clears, continue TPN while caloric intake is still low. 1/30/19 CT scan performed and showed b/l moderate pleural effusions with associated atelectasis.  Partially \par imaged right lower lung patchy opacity may represent small infiltrate in the appropriate clinical setting. Moderate abdominopelvic ascites most prominent subjacent to the anterior  abdominal wall.  Left upper and lower quadrant collection measuring greater than fluid attenuation, which may reflect the presence of hemorrhagic products. Mild enhancement is seen in association with \par the anterior abdominal ascites, dependent pelvic ascites, and left mid abdominal collection, consistent with the presence of peritonitis. Findings may be postoperative or related to the presence of hemorrhagic products.Pt started on po labetalol for persistent HTN, however pt with hypotensive episode in PM shortly after administration. Patient given albumin. 2/1/19, persistently febrile, Tmax 101.3, hypotensive, tachycardic, and tachypneac. Albumin and IVF given, BP did not respond. Levophed restarted. JOURDAN X 2 started to drain copious amounts of bilious outpt. JOURDAN #1 700cc and JOURDAN #1 500cc. Given stat dose of vancomycin and \par meropenum.  2/2/19 CT scan performed and showed New diffuse peritoneal enhancement, compatible with peritonitis, more extensive compared to 1/30/2019. Multiple abdominal and pelvic fluid collections consistent with abscesses. New small bowel wall may also edema and colonic wall thickening, likely reactive. Left upper quadrant collection measuring greater than fluid attenuation is grossly unchanged from 1/30/2019 .On 2/3/19 she received 2 u prbc overnight, placed on BIPAP overnight,  vanc \par started overnight, start diflucan, vaso and levo started overnight, ABG and CBC, PM labs, A line placed overnight. Plan for IR drainage tomorrow. On 2/4/19: on pressors overnight, removed in AM. IR today for abscess drainage. advance tiger tube, NGT removed. repeat labs, remove a line after procedure. Increased serobilious wound drainage, BID dressing changes.2/5/19: diflucan to micafungin. decreased seroquil to 25. IV lock. start trickle feeds glucerna. 2/6: no further diuresis at this time, high bilious midline wound output, vac to be placed today by plastics. Sinha out. feeds to goal today.2/7: Central line and sinha removed, discontinued Seroquel, feeds changed to continuous 2/8: Octreotide started for high output from midline wound. Lasix with goal net negative 2L2/9: Resent blood cx and UA, bedside vac change by plastics today.2/11: Patient ordered for CLD w/ TF via NJT, upper and lower portion of abdominal wound closed by plastics, send left JOURDAN for amylase 2/12: CT A&P shows two collections... drainage by IR today. Drain was upsized on the right and left  collection 50cc of pus was drained.2/13: Stable for dispo to floor. 2/15: ID was consulted and recommended Meropenem and to continue Vancomycin for better source control. Patient was also seen by PM&R and they recommended FARIBA if patient is able to participate in care.2/18: Wound was partially closed by plastics with interrupted sutures and an ostomy appliance was placed, VAC removed.2/19 Antibiotics was changed to Meropenem and Zyvox, culture grew VRE.2/20: Patient had a tube check and the LUQ drain was removed. CT showed improvement in abdominal collections. Calorie count was performed. Patient is tolerating a regular diet with GLucerna shakes. Octreotide discontinued on 2/26.2/28: Tube check done and RLQ drain was removed.Patient will be discharged home. Patient will have [facility] for wound care and PT.  She will be discharged on 1 more week of antibiotics (Cipro and Flagyl per ID).  Discharged home on 3/1/19. \par \par INTERVAL HISTORY:\par 3/12/19 -  Doing better.  Tolerating PO diet.  + GI fxn.  Pain well controlled.  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/day.\par \par

## 2019-03-18 LAB — ACID FAST STN SPEC: SIGNIFICANT CHANGE UP

## 2019-03-19 ENCOUNTER — APPOINTMENT (OUTPATIENT)
Age: 54
End: 2019-03-19
Payer: MEDICAID

## 2019-03-19 VITALS
WEIGHT: 105 LBS | BODY MASS INDEX: 21.17 KG/M2 | HEART RATE: 81 BPM | SYSTOLIC BLOOD PRESSURE: 128 MMHG | TEMPERATURE: 97.8 F | DIASTOLIC BLOOD PRESSURE: 80 MMHG | HEIGHT: 59 IN | RESPIRATION RATE: 18 BRPM | OXYGEN SATURATION: 98 %

## 2019-03-19 PROCEDURE — 99024 POSTOP FOLLOW-UP VISIT: CPT

## 2019-03-19 NOTE — ASSESSMENT
[FreeTextEntry1] : 54 year old woman s/p Whipple procedure for T3N1 (1/24 lymph nodes positive) ampullary adenocarcinoma, complicated by GDA bleed RTOR for oversewing of vessel, abdominal wall bleed requiring opening of the incision and secondary closure with Dermaclose, developed low output ECF\par \par Plan:\par 1) Med onc follow-up\par 2) Increase PO intake and activity\par 3) Wound care - drain (ostomy appliance) removed and replaced with wet to dry dressing.  \par 4) RTO in 2 weeks\par \par

## 2019-03-19 NOTE — HISTORY OF PRESENT ILLNESS
[de-identified] : 54 year old female presents for an initial post op visit/post hospitalization visit. \par \par - HOSPITAL COURSE- Hospitalized at [facility] from 1/5/19-3/5/19\par 53 y.o. 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 Patient had acute onset of RUQ pain yesterday, with no inciting factors or relief with aleve. The patient's family also endorsed 1-2 episodes of NBNB emesis yesterday and has had decreased PO intake over the last 36 hours. As of diagnosis of cancer, the patient has not had follow up with oncology or surg-oncology due insurance issues. The patient has an appointment with [facility] on Wednesday for surgical evaluation. In the ED, patient febrile to 101.5, HR 98, BPs decreased to 98/41, found with leukocytosis to 15.2, lactate 4.4. She was found with RUQ pain, fever, and jaundice (Bilirubin 3.9) consistent with acute cholangitis. CT A/P revealed Hyperattenuation and mild wall thickening involving the CBD raises the possibility of cholangitis with pneumobilia related to recent ERCP. In the ED, patient received IVF, vanc/zosyn, morphine, and zofran. Patient admitted with severe sepsis 2/2 acute cholangitis. MICU was consulted for hypotension, but this improved s/p fluids and IV antibiotics and patient did not warrant MICU level care. GI was consulted and recommended ERCP. The patient was maintained on zosyn for IV antibiotics. Patient remained stable on zosyn, white count trended down, lactate improved, and fevers were resolving. BCs ultimately grew E.Coli. 1/7: ERCP performed. Stent was removed from the biliary tree. One plastic stent was placed into the common bile duct. The major papilla appeared to have a mass.Oncology was consulted given patient's poor outpatient oncology follow up due to insurance issues, they recommended CT scan and  MRI abdomen/pelvis with contrast for continued staging of cancer workup. \par \par 1/8/19 CT A/P - showed Small bilateral pleural effusions with bibasilar atelectasis. No lung nodules. Mildly enlarged right cardiophrenic angle lymph node is unchanged since December 18, 2018 is indeterminate\par \par 1/8/19  MRI-  IMPRESSION: *  No suspicious liver lesions. No abnormality is identified to correspond with questionable lesion identified on prior CT. * Intra and extrahepatic biliary ductal dilation, with mural enhancement which may be seen in the setting of cholangitis. *  Cholelithiasis with gallbladder wall thickening.Pt  transferred to surgical oncology service and went to the OR on 1/11/19. \par \par  1/11/19- S/p ex-lap, Whipple pancreaticoduodenectomy for duodenal adenocarcinoma. \par \par FINAL PATHOLOGY:\par -Invasive adenocarcinoma of the duodenal ampulla (Tumor size: 2.5 x 1.5 x 0.8 cm); Tumor stage: pT3b pN1\par -Vascular and perineural invasion identified\par -1/24 lymph nodes involved by adenocarcinoma\par -Negative margins\par -S/p cholecystectomy ; acute cholecystitis with cholelithiasis \par -Bile duct margin with acute inflammation and focal atypia consistent with low grade dysplasia. \par \par Intraop findings: pylorus sparing Whipple.  Pt tolerated procedure well, without complication.\par \par On 1/18/19, patient was a surgical rapid response for which pt transferred to SICU. Patient was febrile to 102. Cultures sent. Antibiotics changed from zosyn to meropenem. Patient was tachypneic and hypotensive to 70s systolic. CBC revealed drop in H/H to 6/19 with elevated lactate of 9. Decision was made to intubate patient. Massive Transfusion protocol initiated. Patient received 4PRBC 5FFP and 1platelet, and bolus 1L of fluid. Surgical team and attending notified when Peak pressures on vent were 50s and increased abdominal distention. Decision was then made to take patient emergently to OR. Patient had evacuation of hematoma, cessation of gastroduodenal artery bleeding with two hemostatic stitches, and placement of abthera VAC. Pt transferred to SICU post op. Patient hypotensive post-op with low UOP. 2 L bolus given.  CBC remained stable. Patient hypotensive to 80s systolic, [place] started and increased, patient was then transitioned to levo. Lactate downtrended to 7.6 and H/H stable. \par \par On 1/19 pt cont to require pressors, resuscitated with IVF, and had drop H/H for which transfused. On 1/21/19, Pt febrile to 100.6 overnight, cultures sent, UA+ nitrite, follow up urine cultures. \par \par Pt RTOR on 1/21/19 for Exploratory laparotomy, drainage of abdominal abscess, repair of abdominal wall hernia with Strattice mesh, b/ abdominal wall advancement flaps. Post-op patient remained intubated and  off vasopressors. FENA calculated to be pre-renal. 500CC albumin bolus given, urine output improved. 1/22/19 attempted spontaneous breathing trials but pt hypoxic. Pt lactate uptrending, resuscitated w/IVF, and pt hypotensive requiring pressors. UCx growing candida, Diflucan changed to micafungin to r/o resistant organisms in the setting of worsening sepsis. On 1/23/19, patient noted to have blood oozing from \par midline incision while hypotensive and on pressors.  Peak pressures noted to be elevated on vent and abdomen increasingly distended.  All staples removed and a large amount of clot evacuated from midline incision. 1U PRBC given. No active bleeding appreciated.  Fascia remains closed.  Wound packed with wet to dry dressing.  Vitamin K given for elevated INR.  1/24/19 nutrition consulted and pt. started on TPN. Plastic consulted bc midline opened for hematoma evacuation yesterday, external tissue expander device (Dermaclose) applied to the wound. Patient hypotensive requiring reinitiation of low dose vasopressors. On 1/25/19, sedatives and pressors were decreased. Pt was volume overloaded and was given Albumin, Lasix, and a Bumex drip. O/N, pt febrile 102, given tylenol. mildly hypotensive SBP 80, started on small dose levo. 1/27 Cont SBT. Decision to continue diuresis with Diamox 1/28 Patient febrile to 101 today. Diuresing with lasix/bumex, giving albumin. Continued SBTs.  On 1/29/19, patient tolerating CPAP. Patient extubated without issue. NGT was removed. Patient started on clears, continue TPN while caloric intake is still low. 1/30/19 CT scan performed and showed b/l moderate pleural effusions with associated atelectasis.  Partially \par imaged right lower lung patchy opacity may represent small infiltrate in the appropriate clinical setting. Moderate abdominopelvic ascites most prominent subjacent to the anterior  abdominal wall.  Left upper and lower quadrant collection measuring greater than fluid attenuation, which may reflect the presence of hemorrhagic products. Mild enhancement is seen in association with \par the anterior abdominal ascites, dependent pelvic ascites, and left mid abdominal collection, consistent with the presence of peritonitis. Findings may be postoperative or related to the presence of hemorrhagic products.Pt started on po labetalol for persistent HTN, however pt with hypotensive episode in PM shortly after administration. Patient given albumin. 2/1/19, persistently febrile, Tmax 101.3, hypotensive, tachycardic, and tachypneac. Albumin and IVF given, BP did not respond. Levophed restarted. JOURDAN X 2 started to drain copious amounts of bilious outpt. JOURDAN #1 700cc and JOURDAN #1 500cc. Given stat dose of vancomycin and \par meropenum.  2/2/19 CT scan performed and showed New diffuse peritoneal enhancement, compatible with peritonitis, more extensive compared to 1/30/2019. Multiple abdominal and pelvic fluid collections consistent with abscesses. New small bowel wall may also edema and colonic wall thickening, likely reactive. Left upper quadrant collection measuring greater than fluid attenuation is grossly unchanged from 1/30/2019 .On 2/3/19 she received 2 u prbc overnight, placed on BIPAP overnight,  vanc \par started overnight, start diflucan, vaso and levo started overnight, ABG and CBC, PM labs, A line placed overnight. Plan for IR drainage tomorrow. On 2/4/19: on pressors overnight, removed in AM. IR today for abscess drainage. advance tiger tube, NGT removed. repeat labs, remove a line after procedure. Increased serobilious wound drainage, BID dressing changes.2/5/19: diflucan to micafungin. decreased seroquil to 25. IV lock. start trickle feeds glucerna. 2/6: no further diuresis at this time, high bilious midline wound output, vac to be placed today by plastics. Sinha out. feeds to goal today.2/7: Central line and sinha removed, discontinued Seroquel, feeds changed to continuous 2/8: Octreotide started for high output from midline wound. Lasix with goal net negative 2L2/9: Resent blood cx and UA, bedside vac change by plastics today.2/11: Patient ordered for CLD w/ TF via NJT, upper and lower portion of abdominal wound closed by plastics, send left JOURDAN for amylase 2/12: CT A&P shows two collections... drainage by IR today. Drain was upsized on the right and left  collection 50cc of pus was drained.2/13: Stable for dispo to floor. 2/15: ID was consulted and recommended Meropenem and to continue Vancomycin for better source control. Patient was also seen by PM&R and they recommended FARIBA if patient is able to participate in care.2/18: Wound was partially closed by plastics with interrupted sutures and an ostomy appliance was placed, VAC removed.2/19 Antibiotics was changed to Meropenem and Zyvox, culture grew VRE.2/20: Patient had a tube check and the LUQ drain was removed. CT showed improvement in abdominal collections. Calorie count was performed. Patient is tolerating a regular diet with GLucerna shakes. Octreotide discontinued on 2/26.2/28: Tube check done and RLQ drain was removed.Patient will be discharged home. Patient will have VNS for wound care and PT.  She will be discharged on 1 more week of antibiotics (Cipro and Flagyl per ID).  Discharged home on 3/1/19. \par \par INTERVAL HISTORY:\par 3/12/19 -  Doing better.  Tolerating PO diet.  + GI fxn.  Pain well controlled.  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/day.\par \par Recent Labs 3/14/19:\par WBC:   9.4 K/uL\par HGB:  9.0 g/dL\par RBC:  3.07 M/uL\par HCT:  25.9%\par PLT:  298 K/uL\par \par Metabolic:\par Na:  141 mmol/L\par K:  3.8 mmol/L\par Cl:  101 mmol/L\par CO2:  27 mmol/L\par Anion Gap:  13 mmol/L\par Glucose:  139 mg/dL\par BUN:  7 mg/dL\par Creat:  0.38 mg/dL\par Total Protein:  7.3 g/dL\par Albumin:  3.4 g/dL\par Serum Ca:  9.3 mg/dL\par Total Bili:  0.8 mg/dL\par AST:  34 U/L\par ALT:  14 U/L\par ALK Phos:  382 U/L\par eGFR:  121 mL/min/1.73M2\par \par CA 19-9:  42 U/mL\par CEA:  2.0 ng/mL\par \par Hepatitis C:  Nonreactive\par Hepatitis B Core Ab:  Nonreactive\par Hepatitis B Surface Ag:  Nonreactive\par \par APTT:  31.8 sec\par PT:  13.2 sec\par INR:  1.16 ratio\par \par 3/19/19: Today, Ms. Lagunas reports occasional nausea, no vomiting, no pain.  Abdominal wall fistula drained last on Saturday nigt 8 cc.  Not drained since.  Daughter states color of drainage has changed from creamy to tan color.  Denies, fevers, chills.   During this visit, sutures removed from midline incision, as well as drain removed (ostomy appliance).  Wound well healing with granulation tissue in the periphery of wound bed with yellow slough noted in bottom of wound bed.  Wound packed with wet to dry dressing.  Instruction and demonstration provided to patient's daughter on changing wet to dry dressing, however, advised she may return to the ostomy appliance if she feels drainage is increasing.  She will return to office to see me in 2 weeks for follow up.  \par \par \par \par \par \par

## 2019-03-19 NOTE — PHYSICAL EXAM
[Normal] : supple, no neck mass and thyroid not enlarged [Normal Neck Lymph Nodes] : normal neck lymph nodes  [Normal Supraclavicular Lymph Nodes] : normal supraclavicular lymph nodes [Normal Groin Lymph Nodes] : normal groin lymph nodes [Normal Axillary Lymph Nodes] : normal axillary lymph nodes [Normal] : grossly intact [de-identified] : soft NT ND; incision with open area at superior aspect with ostomy appliance - ~20 cc of enteric contents ; nylon sutures removed; ostomy appliance removed - wet to dry dressing applied

## 2019-03-26 LAB
ACID FAST STN SPEC: SIGNIFICANT CHANGE UP
ACID FAST STN SPEC: SIGNIFICANT CHANGE UP

## 2019-03-28 NOTE — PHYSICAL THERAPY INITIAL EVALUATION ADULT - NAME OF CLINICIAN
LINDA Plasencia The patient has been re-examined and I agree with the above assessment or I updated with my findings.

## 2019-03-29 ENCOUNTER — OTHER (OUTPATIENT)
Age: 54
End: 2019-03-29

## 2019-03-29 ENCOUNTER — MEDICATION RENEWAL (OUTPATIENT)
Age: 54
End: 2019-03-29

## 2019-03-29 NOTE — REVIEW OF SYSTEMS
[Fatigue] : fatigue [Recent Change In Weight] : ~T recent weight change [Abdominal Pain] : abdominal pain [Constipation] : constipation [Joint Pain] : joint pain [Joint Stiffness] : joint stiffness [Muscle Weakness] : muscle weakness [Depression] : depression [Negative] : Allergic/Immunologic

## 2019-03-30 ENCOUNTER — FORM ENCOUNTER (OUTPATIENT)
Age: 54
End: 2019-03-30

## 2019-03-31 ENCOUNTER — OUTPATIENT (OUTPATIENT)
Dept: OUTPATIENT SERVICES | Facility: HOSPITAL | Age: 54
LOS: 1 days | End: 2019-03-31
Payer: MEDICAID

## 2019-03-31 ENCOUNTER — APPOINTMENT (OUTPATIENT)
Dept: CT IMAGING | Facility: IMAGING CENTER | Age: 54
End: 2019-03-31
Payer: MEDICAID

## 2019-03-31 ENCOUNTER — APPOINTMENT (OUTPATIENT)
Dept: MRI IMAGING | Facility: IMAGING CENTER | Age: 54
End: 2019-03-31
Payer: MEDICAID

## 2019-03-31 DIAGNOSIS — M25.512 PAIN IN LEFT SHOULDER: ICD-10-CM

## 2019-03-31 PROCEDURE — 74177 CT ABD & PELVIS W/CONTRAST: CPT | Mod: 26

## 2019-03-31 PROCEDURE — 71260 CT THORAX DX C+: CPT | Mod: 26

## 2019-03-31 PROCEDURE — A9585: CPT

## 2019-03-31 PROCEDURE — 82565 ASSAY OF CREATININE: CPT

## 2019-03-31 PROCEDURE — 74177 CT ABD & PELVIS W/CONTRAST: CPT

## 2019-03-31 PROCEDURE — 71260 CT THORAX DX C+: CPT

## 2019-03-31 PROCEDURE — 73223 MRI JOINT UPR EXTR W/O&W/DYE: CPT | Mod: 26,LT

## 2019-03-31 PROCEDURE — 73223 MRI JOINT UPR EXTR W/O&W/DYE: CPT

## 2019-04-02 ENCOUNTER — APPOINTMENT (OUTPATIENT)
Age: 54
End: 2019-04-02
Payer: MEDICAID

## 2019-04-02 VITALS
SYSTOLIC BLOOD PRESSURE: 149 MMHG | BODY MASS INDEX: 21.17 KG/M2 | RESPIRATION RATE: 17 BRPM | OXYGEN SATURATION: 97 % | TEMPERATURE: 98.2 F | HEIGHT: 59 IN | DIASTOLIC BLOOD PRESSURE: 77 MMHG | HEART RATE: 76 BPM | WEIGHT: 105 LBS

## 2019-04-02 PROCEDURE — 99024 POSTOP FOLLOW-UP VISIT: CPT

## 2019-04-03 ENCOUNTER — APPOINTMENT (OUTPATIENT)
Dept: HEMATOLOGY ONCOLOGY | Facility: CLINIC | Age: 54
End: 2019-04-03
Payer: MEDICAID

## 2019-04-03 ENCOUNTER — RESULT REVIEW (OUTPATIENT)
Age: 54
End: 2019-04-03

## 2019-04-03 ENCOUNTER — LABORATORY RESULT (OUTPATIENT)
Age: 54
End: 2019-04-03

## 2019-04-03 VITALS
DIASTOLIC BLOOD PRESSURE: 78 MMHG | SYSTOLIC BLOOD PRESSURE: 131 MMHG | OXYGEN SATURATION: 100 % | BODY MASS INDEX: 22.18 KG/M2 | TEMPERATURE: 98.3 F | RESPIRATION RATE: 18 BRPM | WEIGHT: 109.79 LBS | HEART RATE: 73 BPM

## 2019-04-03 LAB
BASOPHILS # BLD AUTO: 0 K/UL — SIGNIFICANT CHANGE UP (ref 0–0.2)
BASOPHILS NFR BLD AUTO: 0.5 % — SIGNIFICANT CHANGE UP (ref 0–2)
EOSINOPHIL # BLD AUTO: 0.2 K/UL — SIGNIFICANT CHANGE UP (ref 0–0.5)
EOSINOPHIL NFR BLD AUTO: 2.6 % — SIGNIFICANT CHANGE UP (ref 0–6)
HCT VFR BLD CALC: 28.7 % — LOW (ref 34.5–45)
HGB BLD-MCNC: 9.6 G/DL — LOW (ref 11.5–15.5)
LYMPHOCYTES # BLD AUTO: 2.1 K/UL — SIGNIFICANT CHANGE UP (ref 1–3.3)
LYMPHOCYTES # BLD AUTO: 28.5 % — SIGNIFICANT CHANGE UP (ref 13–44)
MCHC RBC-ENTMCNC: 27.4 PG — SIGNIFICANT CHANGE UP (ref 27–34)
MCHC RBC-ENTMCNC: 33.4 G/DL — SIGNIFICANT CHANGE UP (ref 32–36)
MCV RBC AUTO: 81.9 FL — SIGNIFICANT CHANGE UP (ref 80–100)
MONOCYTES # BLD AUTO: 0.6 K/UL — SIGNIFICANT CHANGE UP (ref 0–0.9)
MONOCYTES NFR BLD AUTO: 7.7 % — SIGNIFICANT CHANGE UP (ref 2–14)
NEUTROPHILS # BLD AUTO: 4.6 K/UL — SIGNIFICANT CHANGE UP (ref 1.8–7.4)
NEUTROPHILS NFR BLD AUTO: 60.7 % — SIGNIFICANT CHANGE UP (ref 43–77)
PLATELET # BLD AUTO: 142 K/UL — LOW (ref 150–400)
RBC # BLD: 3.51 M/UL — LOW (ref 3.8–5.2)
RBC # FLD: 13.2 % — SIGNIFICANT CHANGE UP (ref 10.3–14.5)
WBC # BLD: 7.5 K/UL — SIGNIFICANT CHANGE UP (ref 3.8–10.5)
WBC # FLD AUTO: 7.5 K/UL — SIGNIFICANT CHANGE UP (ref 3.8–10.5)

## 2019-04-03 PROCEDURE — 99215 OFFICE O/P EST HI 40 MIN: CPT

## 2019-04-03 NOTE — HISTORY OF PRESENT ILLNESS
[Parents] : parents [Disease: _____________________] : Disease: [unfilled] [T: ___] : T[unfilled] [N: ___] : N[unfilled] [M: ___] : M[unfilled] [AJCC Stage: ____] : AJCC Stage: [unfilled] [de-identified] : 54 F w/ DM, HTN diagnosed with ampullary carcinoma in December 2018. \par \par Initially was admitted to [facility] on 12/18/18 with dizziness, found to have elevated LFTs. MRCP demonstrated a dilatation of the CBD. ERCP on 12/22/18 performed c/w ampullary mass with obstruction. Pathology c/w adenocarcinoma. Pt was discharged for out pt follow up with surg onc and med onc but was re-admitted on 1/5/19 with n/v, worsening abdominal pain, found to have severe sepsis with E coli bacteremia 2/2 acute cholangitis. \par 1/7: ERCP performed. Stent was removed from the biliary tree. One plastic stent placed\par 1/5 CT A/P Heterogeneity of the right hepatic lobe with questionable 1.3 cm hypoattenuating lesion in segment 7.\par 1/8 MRI Abd: No suspicious liver lesions. No abnormality is identified to correspond with questionable lesion identified on prior CT. 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  Taken to the OR for evacuation of hematoma, cessation of gastroduodenal artery bleeding with two hemostatic stitches, and placement of abthera VAC.  Required pressors in the SICU, then developed fever. \par 1/21-1/22: OR exploratory laparotomy, drainage of abdominal abscess, repair of abdominal wall hernia with Strattice mesh, b/ abdominal wall advancement flaps. Post-op patient remained intubated and off vasopressors. FENA calculated to be pre-renal. 500CC albumin bolus given, urine output improved. 1/22/19 attempted spontaneous breathing trials but pt hypoxic. Pt lactate uptrending, resuscitated w/IVF, and pt hypotensive requiring pressors. UCx growing candida, Diflucan changed to micafungin to r/o resistant organisms in the setting of worsening sepsis. \par 1/23/19, patient noted to have blood oozing from midline incision while hypotensive and on pressors. Peak pressures noted to be elevated on vent and abdomen increasingly distended. All staples removed and a large amount of clot evacuated from midline incision. 1U PRBC given. No active bleeding appreciated. Fascia remains closed. Wound packed with wet to dry dressing. Vitamin K given for elevated INR. \par 1/24-1/29: remained in SICU, started on TPN, diuresed, finally extubated. \par 2/1: septic shock again, pressors restarted\par 2/2-2/11: CT scan with peritonitis, new abscesses. Ab and Antifungals started. Wound with increase in drainage. Octreotide started for increased output \par 2/12: drainage of abscesses by IR.  \par 2/18: Wound was partially closed by plastics with interrupted sutures and an ostomy appliance was placed, VAC removed.\par 3/1/19. d/c home \par  [de-identified] : adenocarcinoma [de-identified] : FINAL PATHOLOGY:\par -Invasive adenocarcinoma of the duodenal ampulla (Tumor size: 2.5 x 1.5 x 0.8 cm); Tumor stage: pT3b pN1\par -Vascular and perineural invasion identified\par -1/24 lymph nodes involved by adenocarcinoma\par -Negative margins\par -S/p cholecystectomy ; acute cholecystitis with cholelithiasis \par -Bile duct margin with acute inflammation and focal atypia consistent with low grade dysplasia. \par  [de-identified] : Mae presents for f/u accompanied by daughter. She c/o diffuse abdominal pain, 7/10 right now. Does not take Oxycodone consistently. Thinks "its not good to take pain meds". She waits until the pain is 10/10 and then reports the opiod is not effective. \par Appetite has improved. Gained 4 lbs since last visit. \par Is more active at home now. cooking, cleaning. \par shoulder is still bothering her. Not sleeping well at night. \par

## 2019-04-03 NOTE — PHYSICAL EXAM
[Ambulatory and capable of all self care but unable to carry out any work activities] : Status 2- Ambulatory and capable of all self care but unable to carry out any work activities. Up and about more than 50% of waking hours [Normal] : affect appropriate [de-identified] : + midline fistula [de-identified] : limited L shoulder ROM, pain on palpation of AC joint

## 2019-04-03 NOTE — REVIEW OF SYSTEMS
[Fatigue] : fatigue [Abdominal Pain] : abdominal pain [Joint Pain] : joint pain [Joint Stiffness] : joint stiffness [Muscle Weakness] : muscle weakness [Negative] : Allergic/Immunologic

## 2019-04-03 NOTE — HISTORY OF PRESENT ILLNESS
[T: ___] : T[unfilled] [N: ___] : N[unfilled] [M: ___] : M[unfilled] [AJCC Stage: ____] : AJCC Stage: [unfilled] [Disease: _____________________] : Disease: [unfilled] [de-identified] : 54 F w/ DM, HTN diagnosed with ampullary carcinoma in December 2018. \par \par Initially was admitted to [facility] on 12/18/18 with dizziness, found to have elevated LFTs. MRCP demonstrated a dilatation of the CBD. ERCP on 12/22/18 performed c/w ampullary mass with obstruction. Pathology c/w adenocarcinoma. Pt was discharged for out pt follow up with surg onc and med onc but was re-admitted on 1/5/19 with n/v, worsening abdominal pain, found to have severe sepsis with E coli bacteremia 2/2 acute cholangitis. \par 1/7: ERCP performed. Stent was removed from the biliary tree. One plastic stent placed\par 1/5 CT A/P Heterogeneity of the right hepatic lobe with questionable 1.3 cm hypoattenuating lesion in segment 7.\par 1/8 MRI Abd: No suspicious liver lesions. No abnormality is identified to correspond with questionable lesion identified on prior CT. 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  Taken to the OR for evacuation of hematoma, cessation of gastroduodenal artery bleeding with two hemostatic stitches, and placement of abthera VAC.  Required pressors in the SICU, then developed fever. \par 1/21-1/22: OR exploratory laparotomy, drainage of abdominal abscess, repair of abdominal wall hernia with Strattice mesh, b/ abdominal wall advancement flaps. Post-op patient remained intubated and off vasopressors. FENA calculated to be pre-renal. 500CC albumin bolus given, urine output improved. 1/22/19 attempted spontaneous breathing trials but pt hypoxic. Pt lactate uptrending, resuscitated w/IVF, and pt hypotensive requiring pressors. UCx growing candida, Diflucan changed to micafungin to r/o resistant organisms in the setting of worsening sepsis. \par 1/23/19, patient noted to have blood oozing from midline incision while hypotensive and on pressors. Peak pressures noted to be elevated on vent and abdomen increasingly distended. All staples removed and a large amount of clot evacuated from midline incision. 1U PRBC given. No active bleeding appreciated. Fascia remains closed. Wound packed with wet to dry dressing. Vitamin K given for elevated INR. \par 1/24-1/29: remained in SICU, started on TPN, diuresed, finally extubated. \par 2/1: septic shock again, pressors restarted\par 2/2-2/11: CT scan with peritonitis, new abscesses. Ab and Antifungals started. Wound with increase in drainage. Octreotide started for increased output \par 2/12: drainage of abscesses by IR.  \par 2/18: Wound was partially closed by plastics with interrupted sutures and an ostomy appliance was placed, VAC removed.\par 3/1/19. d/c home \par  [de-identified] : adenocarcinoma [de-identified] : FINAL PATHOLOGY:\par -Invasive adenocarcinoma of the duodenal ampulla (Tumor size: 2.5 x 1.5 x 0.8 cm); Tumor stage: pT3b pN1\par -Vascular and perineural invasion identified\par -1/24 lymph nodes involved by adenocarcinoma\par -Negative margins\par -S/p cholecystectomy ; acute cholecystitis with cholelithiasis \par -Bile duct margin with acute inflammation and focal atypia consistent with low grade dysplasia. \par  [de-identified] : has abdominal discomfort in the area of the fistula. Tylenol does not help. \par appetite is slow to recover but improving. tolerating liquid. Does not tolerate milk. \par + constipation. Not taking any stool softeners or laxatives. \par coloscopy = last one was 7 yrs ago. \par sleeping well. \par needs help with all ADLs\par noted a change in BMs for the last year -change in caliber. small size. 2-3 days ago noted melena. Not taking oral iron or pepto bismol. \par not able to walk independently. using a walker.  \par mood swings. + irritable. \par LE edema worse over the last 2 day \par + L shoulder pain \par no SOB.\par + dry cough x 10 yrs, worse when laying down. \par  no fevers, hypothermia 95 \par has chronic neuropathy in b/l hands, + b/l carpal tunnel. - had been following with Serjio Caro ([facility]) in the [place]. Does not want to see any doctors in the [place]. \par fell at home 3/6 - fell on the L side

## 2019-04-03 NOTE — PHYSICAL EXAM
[Restricted in physically strenuous activity but ambulatory and able to carry out work of a light or sedentary nature] : Status 1- Restricted in physically strenuous activity but ambulatory and able to carry out work of a light or sedentary nature, e.g., light house work, office work [Normal] : affect appropriate [de-identified] : appears to be in pain  [de-identified] : midline wound with granulation tissue, dressing is c/d/i [de-identified] : limited ROM of left shoulder, TTP of posterior area

## 2019-04-03 NOTE — REASON FOR VISIT
[Initial Consultation] : an initial consultation [Family Member] : family member [FreeTextEntry2] : Ampullary ca s/p resection

## 2019-04-03 NOTE — REASON FOR VISIT
[Follow-Up Visit] : a follow-up [Family Member] : family member [FreeTextEntry2] : Ampullary cancer

## 2019-04-04 ENCOUNTER — OTHER (OUTPATIENT)
Age: 54
End: 2019-04-04

## 2019-04-08 ENCOUNTER — APPOINTMENT (OUTPATIENT)
Dept: ENDOVASCULAR SURGERY | Facility: CLINIC | Age: 54
End: 2019-04-08

## 2019-04-09 NOTE — PHYSICAL EXAM
[Normal] : supple, no neck mass and thyroid not enlarged [Normal] : oriented to person, place and time, with appropriate affect [de-identified] : soft NT, slightly tender; midline wound packed with wet to dry gauze dressing - wound granulating well

## 2019-04-09 NOTE — ASSESSMENT
[FreeTextEntry1] : 54 year old woman s/p Whipple procedure for T3N1 (1/24 lymph nodes positive) ampullary adenocarcinoma, complicated by GDA bleed RTOR for oversewing of vessel, abdominal wall bleed requiring opening of the incision and secondary closure with Dermaclose, developed low output ECF.  Ostomy appliance removed and replaced with wet to dry dressing. \par \par Plan:\par 1) Med onc follow-up (scheduled to see Dr. Navarro tomorrow, 4/3/19)\par 2) Increase PO intake and activity\par 3)  Continue Wound care with wet to dry dressing \par 4) RTO 1 month \par \par

## 2019-04-09 NOTE — HISTORY OF PRESENT ILLNESS
[de-identified] : 54 year old female presents for an ongoing post op visit. \par \par - HOSPITAL COURSE- Hospitalized at [facility] from 1/5/19-3/5/19\par 53 y.o. 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 Patient had acute onset of RUQ pain yesterday, with no inciting factors or relief with aleve. The patient's family also endorsed 1-2 episodes of NBNB emesis yesterday and has had decreased PO intake over the last 36 hours. As of diagnosis of cancer, the patient has not had follow up with oncology or surg-oncology due insurance issues. The patient has an appointment with [facility] on Wednesday for surgical evaluation. In the ED, patient febrile to 101.5, HR 98, BPs decreased to 98/41, found with leukocytosis to 15.2, lactate 4.4. She was found with RUQ pain, fever, and jaundice (Bilirubin 3.9) consistent with acute cholangitis. CT A/P revealed Hyperattenuation and mild wall thickening involving the CBD raises the possibility of cholangitis with pneumobilia related to recent ERCP. In the ED, patient received IVF, vanc/zosyn, morphine, and zofran. Patient admitted with severe sepsis 2/2 acute cholangitis. MICU was consulted for hypotension, but this improved s/p fluids and IV antibiotics and patient did not warrant MICU level care. GI was consulted and recommended ERCP. The patient was maintained on zosyn for IV antibiotics. Patient remained stable on zosyn, white count trended down, lactate improved, and fevers were resolving. BCs ultimately grew E.Coli. 1/7: ERCP performed. Stent was removed from the biliary tree. One plastic stent was placed into the common bile duct. The major papilla appeared to have a mass.Oncology was consulted given patient's poor outpatient oncology follow up due to insurance issues, they recommended CT scan and  MRI abdomen/pelvis with contrast for continued staging of cancer workup. \par \par 1/8/19 CT A/P - showed Small bilateral pleural effusions with bibasilar atelectasis. No lung nodules. Mildly enlarged right cardiophrenic angle lymph node is unchanged since December 18, 2018 is indeterminate\par \par 1/8/19  MRI-  IMPRESSION: *  No suspicious liver lesions. No abnormality is identified to correspond with questionable lesion identified on prior CT. * Intra and extrahepatic biliary ductal dilation, with mural enhancement which may be seen in the setting of cholangitis. *  Cholelithiasis with gallbladder wall thickening.Pt  transferred to surgical oncology service and went to the OR on 1/11/19. \par \par  1/11/19- S/p ex-lap, Whipple pancreaticoduodenectomy for duodenal adenocarcinoma. \par \par FINAL PATHOLOGY:\par -Invasive adenocarcinoma of the duodenal ampulla (Tumor size: 2.5 x 1.5 x 0.8 cm); Tumor stage: pT3b pN1\par -Vascular and perineural invasion identified\par -1/24 lymph nodes involved by adenocarcinoma\par -Negative margins\par -S/p cholecystectomy ; acute cholecystitis with cholelithiasis \par -Bile duct margin with acute inflammation and focal atypia consistent with low grade dysplasia. \par \par Intraop findings: pylorus sparing Whipple.  Pt tolerated procedure well, without complication.\par \par On 1/18/19, patient was a surgical rapid response for which pt transferred to SICU. Patient was febrile to 102. Cultures sent. Antibiotics changed from zosyn to meropenem. Patient was tachypneic and hypotensive to 70s systolic. CBC revealed drop in H/H to 6/19 with elevated lactate of 9. Decision was made to intubate patient. Massive Transfusion protocol initiated. Patient received 4PRBC 5FFP and 1platelet, and bolus 1L of fluid. Surgical team and attending notified when Peak pressures on vent were 50s and increased abdominal distention. Decision was then made to take patient emergently to OR. Patient had evacuation of hematoma, cessation of gastroduodenal artery bleeding with two hemostatic stitches, and placement of abthera VAC. Pt transferred to SICU post op. Patient hypotensive post-op with low UOP. 2 L bolus given.  CBC remained stable. Patient hypotensive to 80s systolic, [place] started and increased, patient was then transitioned to levo. Lactate downtrended to 7.6 and H/H stable. \par \par On 1/19 pt cont to require pressors, resuscitated with IVF, and had drop H/H for which transfused. On 1/21/19, Pt febrile to 100.6 overnight, cultures sent, UA+ nitrite, follow up urine cultures. \par \par Pt RTOR on 1/21/19 for Exploratory laparotomy, drainage of abdominal abscess, repair of abdominal wall hernia with Strattice mesh, b/ abdominal wall advancement flaps. Post-op patient remained intubated and  off vasopressors. FENA calculated to be pre-renal. 500CC albumin bolus given, urine output improved. 1/22/19 attempted spontaneous breathing trials but pt hypoxic. Pt lactate uptrending, resuscitated w/IVF, and pt hypotensive requiring pressors. UCx growing candida, Diflucan changed to micafungin to r/o resistant organisms in the setting of worsening sepsis. On 1/23/19, patient noted to have blood oozing from \par midline incision while hypotensive and on pressors.  Peak pressures noted to be elevated on vent and abdomen increasingly distended.  All staples removed and a large amount of clot evacuated from midline incision. 1U PRBC given. No active bleeding appreciated.  Fascia remains closed.  Wound packed with wet to dry dressing.  Vitamin K given for elevated INR.  1/24/19 nutrition consulted and pt. started on TPN. Plastic consulted bc midline opened for hematoma evacuation yesterday, external tissue expander device (Dermaclose) applied to the wound. Patient hypotensive requiring reinitiation of low dose vasopressors. On 1/25/19, sedatives and pressors were decreased. Pt was volume overloaded and was given Albumin, Lasix, and a Bumex drip. O/N, pt febrile 102, given tylenol. mildly hypotensive SBP 80, started on small dose levo. 1/27 Cont SBT. Decision to continue diuresis with Diamox 1/28 Patient febrile to 101 today. Diuresing with lasix/bumex, giving albumin. Continued SBTs.  On 1/29/19, patient tolerating CPAP. Patient extubated without issue. NGT was removed. Patient started on clears, continue TPN while caloric intake is still low. 1/30/19 CT scan performed and showed b/l moderate pleural effusions with associated atelectasis.  Partially \par imaged right lower lung patchy opacity may represent small infiltrate in the appropriate clinical setting. Moderate abdominopelvic ascites most prominent subjacent to the anterior  abdominal wall.  Left upper and lower quadrant collection measuring greater than fluid attenuation, which may reflect the presence of hemorrhagic products. Mild enhancement is seen in association with \par the anterior abdominal ascites, dependent pelvic ascites, and left mid abdominal collection, consistent with the presence of peritonitis. Findings may be postoperative or related to the presence of hemorrhagic products.Pt started on po labetalol for persistent HTN, however pt with hypotensive episode in PM shortly after administration. Patient given albumin. 2/1/19, persistently febrile, Tmax 101.3, hypotensive, tachycardic, and tachypneac. Albumin and IVF given, BP did not respond. Levophed restarted. JOURDAN X 2 started to drain copious amounts of bilious outpt. JOURDAN #1 700cc and JOURDAN #1 500cc. Given stat dose of vancomycin and \par meropenum.  2/2/19 CT scan performed and showed New diffuse peritoneal enhancement, compatible with peritonitis, more extensive compared to 1/30/2019. Multiple abdominal and pelvic fluid collections consistent with abscesses. New small bowel wall may also edema and colonic wall thickening, likely reactive. Left upper quadrant collection measuring greater than fluid attenuation is grossly unchanged from 1/30/2019 .On 2/3/19 she received 2 u prbc overnight, placed on BIPAP overnight,  vanc \par started overnight, start diflucan, vaso and levo started overnight, ABG and CBC, PM labs, A line placed overnight. Plan for IR drainage tomorrow. On 2/4/19: on pressors overnight, removed in AM. IR today for abscess drainage. advance tiger tube, NGT removed. repeat labs, remove a line after procedure. Increased serobilious wound drainage, BID dressing changes.2/5/19: diflucan to micafungin. decreased seroquil to 25. IV lock. start trickle feeds glucerna. 2/6: no further diuresis at this time, high bilious midline wound output, vac to be placed today by plastics. Sinha out. feeds to goal today.2/7: Central line and sinha removed, discontinued Seroquel, feeds changed to continuous 2/8: Octreotide started for high output from midline wound. Lasix with goal net negative 2L2/9: Resent blood cx and UA, bedside vac change by plastics today.2/11: Patient ordered for CLD w/ TF via NJT, upper and lower portion of abdominal wound closed by plastics, send left JOURDAN for amylase 2/12: CT A&P shows two collections... drainage by IR today. Drain was upsized on the right and left  collection 50cc of pus was drained.2/13: Stable for dispo to floor. 2/15: ID was consulted and recommended Meropenem and to continue Vancomycin for better source control. Patient was also seen by PM&R and they recommended FARIBA if patient is able to participate in care.2/18: Wound was partially closed by plastics with interrupted sutures and an ostomy appliance was placed, VAC removed.2/19 Antibiotics was changed to Meropenem and Zyvox, culture grew VRE.2/20: Patient had a tube check and the LUQ drain was removed. CT showed improvement in abdominal collections. Calorie count was performed. Patient is tolerating a regular diet with GLucerna shakes. Octreotide discontinued on 2/26.2/28: Tube check done and RLQ drain was removed.Patient will be discharged home. Patient will have VNS for wound care and PT.  She will be discharged on 1 more week of antibiotics (Cipro and Flagyl per ID).  Discharged home on 3/1/19. \par \par INTERVAL HISTORY:\par 3/12/19 -  Doing better.  Tolerating PO diet.  + GI fxn.  Pain well controlled.  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/day.\par \par Recent Labs 3/14/19:\par WBC:   9.4 K/uL\par HGB:  9.0 g/dL\par RBC:  3.07 M/uL\par HCT:  25.9%\par PLT:  298 K/uL\par \par Metabolic:\par Na:  141 mmol/L\par K:  3.8 mmol/L\par Cl:  101 mmol/L\par CO2:  27 mmol/L\par Anion Gap:  13 mmol/L\par Glucose:  139 mg/dL\par BUN:  7 mg/dL\par Creat:  0.38 mg/dL\par Total Protein:  7.3 g/dL\par Albumin:  3.4 g/dL\par Serum Ca:  9.3 mg/dL\par Total Bili:  0.8 mg/dL\par AST:  34 U/L\par ALT:  14 U/L\par ALK Phos:  382 U/L\par eGFR:  121 mL/min/1.73M2\par \par CA 19-9:  42 U/mL\par CEA:  2.0 ng/mL\par \par Hepatitis C:  Nonreactive\par Hepatitis B Core Ab:  Nonreactive\par Hepatitis B Surface Ag:  Nonreactive\par \par APTT:  31.8 sec\par PT:  13.2 sec\par INR:  1.16 ratio\par \par 3/19/19: Today, Ms. Lagunas reports occasional nausea, no vomiting, no pain.  Abdominal wall fistula drained last on Saturday nigt 8 cc.  Not drained since.  Daughter states color of drainage has changed from creamy to tan color.  Denies, fevers, chills.   During this visit, sutures removed from midline incision, as well as drain removed (ostomy appliance).  Wound well healing with granulation tissue in the periphery of wound bed with yellow slough noted in bottom of wound bed.  Wound packed with wet to dry dressing.  Instruction and demonstration provided to patient's daughter on changing wet to dry dressing, however, advised she may return to the ostomy appliance if she feels drainage is increasing.  She will return to office to see me in 2 weeks for follow up.  \par \par INTERVAL HISTORY:\par 4/2/19- Daughter continues to change midline wound with wet to dry gauze dressing daily.  The patient is with c/o 7-8 out of 10 abdominal pain with movement and activity, partially relieved with Oxycodone.  She denies fever or chills.  Denies nausea or vomiting. Having daily Bm's and passing flatus.  Appetite continues to improve. The daughter states she is eating an adequate amount of food.  She is scheduled to see Dr. Katty Navarro (Heme-onc) tomorrow. \par \par \par

## 2019-04-10 ENCOUNTER — APPOINTMENT (OUTPATIENT)
Dept: ORTHOPEDIC SURGERY | Facility: HOSPITAL | Age: 54
End: 2019-04-10

## 2019-04-10 ENCOUNTER — OUTPATIENT (OUTPATIENT)
Dept: OUTPATIENT SERVICES | Facility: HOSPITAL | Age: 54
LOS: 1 days | End: 2019-04-10

## 2019-04-10 VITALS
SYSTOLIC BLOOD PRESSURE: 139 MMHG | HEART RATE: 71 BPM | HEIGHT: 59 IN | WEIGHT: 110 LBS | BODY MASS INDEX: 22.18 KG/M2 | DIASTOLIC BLOOD PRESSURE: 70 MMHG

## 2019-04-11 DIAGNOSIS — M75.82 OTHER SHOULDER LESIONS, LEFT SHOULDER: ICD-10-CM

## 2019-04-11 DIAGNOSIS — M25.512 PAIN IN LEFT SHOULDER: ICD-10-CM

## 2019-04-14 ENCOUNTER — FORM ENCOUNTER (OUTPATIENT)
Age: 54
End: 2019-04-14

## 2019-04-15 ENCOUNTER — OUTPATIENT (OUTPATIENT)
Dept: OUTPATIENT SERVICES | Facility: HOSPITAL | Age: 54
LOS: 1 days | End: 2019-04-15
Payer: MEDICAID

## 2019-04-15 VITALS
DIASTOLIC BLOOD PRESSURE: 71 MMHG | RESPIRATION RATE: 14 BRPM | TEMPERATURE: 98 F | HEIGHT: 60 IN | SYSTOLIC BLOOD PRESSURE: 128 MMHG | WEIGHT: 108.91 LBS | HEART RATE: 67 BPM

## 2019-04-15 DIAGNOSIS — C24.1 MALIGNANT NEOPLASM OF AMPULLA OF VATER: ICD-10-CM

## 2019-04-15 LAB — GLUCOSE BLDC GLUCOMTR-MCNC: 148 MG/DL — HIGH (ref 70–99)

## 2019-04-15 PROCEDURE — C1769: CPT

## 2019-04-15 PROCEDURE — 36561 INSERT TUNNELED CV CATH: CPT

## 2019-04-15 PROCEDURE — 77001 FLUOROGUIDE FOR VEIN DEVICE: CPT | Mod: 26

## 2019-04-15 PROCEDURE — 77001 FLUOROGUIDE FOR VEIN DEVICE: CPT

## 2019-04-15 PROCEDURE — C1788: CPT

## 2019-04-15 PROCEDURE — 76937 US GUIDE VASCULAR ACCESS: CPT

## 2019-04-15 PROCEDURE — 82962 GLUCOSE BLOOD TEST: CPT

## 2019-04-15 PROCEDURE — 76937 US GUIDE VASCULAR ACCESS: CPT | Mod: 26

## 2019-04-15 PROCEDURE — C1887: CPT

## 2019-04-15 NOTE — PRE-ANESTHESIA EVALUATION ADULT - NSANTHOSAYNRD_GEN_A_CORE
No. MEIR screening performed.  STOP BANG Legend: 0-2 = LOW Risk; 3-4 = INTERMEDIATE Risk; 5-8 = HIGH Risk

## 2019-04-15 NOTE — PRE-ANESTHESIA EVALUATION ADULT - NSANTHPMHFT_GEN_ALL_CORE
From 4/3/2019 [facility] note   "Initially was admitted to [facility] on 12/18/18 with dizziness, found to have elevated LFTs. MRCP demonstrated a dilatation of the CBD. ERCP on 12/22/18 performed c/w ampullary mass with obstruction. Pathology c/w adenocarcinoma. Pt was discharged for out pt follow up with surg onc and med onc but was re-admitted on 1/5/19 with n/v, worsening abdominal pain, found to have severe sepsis with E coli bacteremia 2/2 acute cholangitis.   1/7: ERCP performed. Stent was removed from the biliary tree. One plastic stent placed  1/5 CT A/P Heterogeneity of the right hepatic lobe with questionable 1.3 cm hypoattenuating lesion in segment 7.  1/8 MRI Abd: No suspicious liver lesions. No abnormality is identified to correspond with questionable lesion identified on prior CT.   1/11- S/p ex-lap, Whipple pancreaticoduodenectomy for duodenal adenocarcinoma. - T3N1b  1/18-1/20: Developed hemorrhagic shock, drop in H/H to 6/19 lactate 9. Taken to the OR for evacuation of hematoma, cessation of gastroduodenal artery bleeding with two hemostatic stitches, and placement of abthera VAC. Required pressors in the SICU, then developed fever.   1/21-1/22: OR exploratory laparotomy, drainage of abdominal abscess, repair of abdominal wall hernia with Strattice mesh, b/ abdominal wall advancement flaps. Post-op patient remained intubated and off vasopressors. FENA calculated to be pre-renal. 500CC albumin bolus given, urine output improved. 1/22/19 attempted spontaneous breathing trials but pt hypoxic. Pt lactate uptrending, resuscitated w/IVF, and pt hypotensive requiring pressors. UCx growing candida, Diflucan changed to micafungin to r/o resistant organisms in the setting of worsening sepsis.   1/23/19, patient noted to have blood oozing from midline incision while hypotensive and on pressors. Peak pressures noted to be elevated on vent and abdomen increasingly distended. All staples removed and a large amount of clot evacuated from midline incision. 1U PRBC given. No active bleeding appreciated. Fascia remains closed. Wound packed with wet to dry dressing. Vitamin K given for elevated INR.   1/24-1/29: remained in SICU, started on TPN, diuresed, finally extubated.   2/1: septic shock again, pressors restarted  2/2-2/11: CT scan with peritonitis, new abscesses. Ab and Antifungals started. Wound with increase in drainage. Octreotide started for increased output   2/12: drainage of abscesses by IR.   2/18: Wound was partially closed by plastics with interrupted sutures and an ostomy appliance was placed, VAC removed.  3/1/19. d/c home"

## 2019-04-17 ENCOUNTER — APPOINTMENT (OUTPATIENT)
Dept: INTERNAL MEDICINE | Facility: CLINIC | Age: 54
End: 2019-04-17
Payer: MEDICAID

## 2019-04-17 VITALS — SYSTOLIC BLOOD PRESSURE: 110 MMHG | DIASTOLIC BLOOD PRESSURE: 70 MMHG

## 2019-04-17 VITALS
TEMPERATURE: 98.2 F | DIASTOLIC BLOOD PRESSURE: 78 MMHG | OXYGEN SATURATION: 99 % | SYSTOLIC BLOOD PRESSURE: 130 MMHG | HEART RATE: 75 BPM | HEIGHT: 59 IN | BODY MASS INDEX: 20.36 KG/M2 | WEIGHT: 101 LBS

## 2019-04-17 DIAGNOSIS — Z83.3 FAMILY HISTORY OF DIABETES MELLITUS: ICD-10-CM

## 2019-04-17 DIAGNOSIS — Z74.09 OTHER REDUCED MOBILITY: ICD-10-CM

## 2019-04-17 DIAGNOSIS — Z00.00 ENCOUNTER FOR GENERAL ADULT MEDICAL EXAMINATION W/OUT ABNORMAL FINDINGS: ICD-10-CM

## 2019-04-17 DIAGNOSIS — R26.2 DIFFICULTY IN WALKING, NOT ELSEWHERE CLASSIFIED: ICD-10-CM

## 2019-04-17 PROCEDURE — 93000 ELECTROCARDIOGRAM COMPLETE: CPT

## 2019-04-17 PROCEDURE — G0009: CPT

## 2019-04-17 PROCEDURE — 99386 PREV VISIT NEW AGE 40-64: CPT | Mod: 25

## 2019-04-17 PROCEDURE — 94010 BREATHING CAPACITY TEST: CPT

## 2019-04-17 PROCEDURE — 90670 PCV13 VACCINE IM: CPT

## 2019-04-17 PROCEDURE — 36415 COLL VENOUS BLD VENIPUNCTURE: CPT

## 2019-04-17 NOTE — PLAN
[FreeTextEntry1] : EKG- NSR 69\par f/u w dentist\par quad cane written for pt\par pneum 13 given L arm\par rec shingrix vaccine.\par spirometry-nl FEV1/ FVC

## 2019-04-17 NOTE — HEALTH RISK ASSESSMENT
[0] : 2) Feeling down, depressed, or hopeless: Not at all (0) [HIV test declined] : HIV test declined [ColonoscopyDate] : 2013 [ColonoscopyComments] : nl as per pt

## 2019-04-17 NOTE — HISTORY OF PRESENT ILLNESS
[FreeTextEntry1] : CPE\par  [de-identified] : pt accompanied by daughterOlegario\par vaccine- pt refused any vaccines but willing get pneum 13 vac\par diet- healthy\par \par chronic cough- 9 yr- daily- evaluated by multiple physicians. CT chest- 3/2019- minimal L pleural effusion\par swelling ankles- since Wipple surgery- intermittent swelling\par pt being f/u w Oncology

## 2019-04-18 LAB
CHOLEST SERPL-MCNC: 150 MG/DL
CHOLEST/HDLC SERPL: 2.5 RATIO
CREAT SPEC-SCNC: 73 MG/DL
ESTIMATED AVERAGE GLUCOSE: 117 MG/DL
HBA1C MFR BLD HPLC: 5.7 %
HCV AB SER QL: NONREACTIVE
HCV S/CO RATIO: 0.13 S/CO
HDLC SERPL-MCNC: 61 MG/DL
LDLC SERPL CALC-MCNC: 70 MG/DL
LDLC SERPL DIRECT ASSAY-MCNC: 71 MG/DL
MICROALBUMIN 24H UR DL<=1MG/L-MCNC: 2.3 MG/DL
MICROALBUMIN/CREAT 24H UR-RTO: 32 MG/G
SAVE SPECIMEN: NORMAL
TRIGL SERPL-MCNC: 94 MG/DL
TSH SERPL-ACNC: 3.37 UIU/ML

## 2019-04-19 DIAGNOSIS — Z45.2 ENCOUNTER FOR ADJUSTMENT AND MANAGEMENT OF VASCULAR ACCESS DEVICE: ICD-10-CM

## 2019-04-19 DIAGNOSIS — C24.1 MALIGNANT NEOPLASM OF AMPULLA OF VATER: ICD-10-CM

## 2019-04-20 ENCOUNTER — FORM ENCOUNTER (OUTPATIENT)
Age: 54
End: 2019-04-20

## 2019-04-21 ENCOUNTER — APPOINTMENT (OUTPATIENT)
Dept: MRI IMAGING | Facility: IMAGING CENTER | Age: 54
End: 2019-04-21
Payer: MEDICAID

## 2019-04-21 ENCOUNTER — OUTPATIENT (OUTPATIENT)
Dept: OUTPATIENT SERVICES | Facility: HOSPITAL | Age: 54
LOS: 1 days | End: 2019-04-21
Payer: MEDICAID

## 2019-04-21 DIAGNOSIS — C24.1 MALIGNANT NEOPLASM OF AMPULLA OF VATER: ICD-10-CM

## 2019-04-21 PROCEDURE — 74183 MRI ABD W/O CNTR FLWD CNTR: CPT

## 2019-04-21 PROCEDURE — 74183 MRI ABD W/O CNTR FLWD CNTR: CPT | Mod: 26

## 2019-04-21 PROCEDURE — A9585: CPT

## 2019-04-23 ENCOUNTER — OUTPATIENT (OUTPATIENT)
Dept: OUTPATIENT SERVICES | Facility: HOSPITAL | Age: 54
LOS: 1 days | Discharge: HOME | End: 2019-04-23

## 2019-04-23 DIAGNOSIS — C24.1 MALIGNANT NEOPLASM OF AMPULLA OF VATER: ICD-10-CM

## 2019-04-24 ENCOUNTER — RESULT REVIEW (OUTPATIENT)
Age: 54
End: 2019-04-24

## 2019-04-24 ENCOUNTER — APPOINTMENT (OUTPATIENT)
Dept: HEMATOLOGY ONCOLOGY | Facility: CLINIC | Age: 54
End: 2019-04-24
Payer: MEDICAID

## 2019-04-24 VITALS
RESPIRATION RATE: 18 BRPM | DIASTOLIC BLOOD PRESSURE: 80 MMHG | SYSTOLIC BLOOD PRESSURE: 137 MMHG | OXYGEN SATURATION: 100 % | BODY MASS INDEX: 21.28 KG/M2 | TEMPERATURE: 98.2 F | WEIGHT: 105.38 LBS | HEART RATE: 66 BPM

## 2019-04-24 LAB
BASOPHILS # BLD AUTO: 0.1 K/UL — SIGNIFICANT CHANGE UP (ref 0–0.2)
BASOPHILS NFR BLD AUTO: 0.5 % — SIGNIFICANT CHANGE UP (ref 0–2)
EOSINOPHIL # BLD AUTO: 0.2 K/UL — SIGNIFICANT CHANGE UP (ref 0–0.5)
EOSINOPHIL NFR BLD AUTO: 2.2 % — SIGNIFICANT CHANGE UP (ref 0–6)
HCT VFR BLD CALC: 33.3 % — LOW (ref 34.5–45)
HGB BLD-MCNC: 11.5 G/DL — SIGNIFICANT CHANGE UP (ref 11.5–15.5)
LYMPHOCYTES # BLD AUTO: 3.1 K/UL — SIGNIFICANT CHANGE UP (ref 1–3.3)
LYMPHOCYTES # BLD AUTO: 30.1 % — SIGNIFICANT CHANGE UP (ref 13–44)
MCHC RBC-ENTMCNC: 27.9 PG — SIGNIFICANT CHANGE UP (ref 27–34)
MCHC RBC-ENTMCNC: 34.5 G/DL — SIGNIFICANT CHANGE UP (ref 32–36)
MCV RBC AUTO: 80.8 FL — SIGNIFICANT CHANGE UP (ref 80–100)
MONOCYTES # BLD AUTO: 1.3 K/UL — HIGH (ref 0–0.9)
MONOCYTES NFR BLD AUTO: 12.3 % — SIGNIFICANT CHANGE UP (ref 2–14)
NEUTROPHILS # BLD AUTO: 5.7 K/UL — SIGNIFICANT CHANGE UP (ref 1.8–7.4)
NEUTROPHILS NFR BLD AUTO: 54.9 % — SIGNIFICANT CHANGE UP (ref 43–77)
PLATELET # BLD AUTO: 161 K/UL — SIGNIFICANT CHANGE UP (ref 150–400)
RBC # BLD: 4.12 M/UL — SIGNIFICANT CHANGE UP (ref 3.8–5.2)
RBC # FLD: 13 % — SIGNIFICANT CHANGE UP (ref 10.3–14.5)
WBC # BLD: 10.4 K/UL — SIGNIFICANT CHANGE UP (ref 3.8–10.5)
WBC # FLD AUTO: 10.4 K/UL — SIGNIFICANT CHANGE UP (ref 3.8–10.5)

## 2019-04-24 PROCEDURE — 99215 OFFICE O/P EST HI 40 MIN: CPT

## 2019-04-24 RX ORDER — PIOGLITAZONE HYDROCHLORIDE 15 MG/1
15 TABLET ORAL DAILY
Qty: 30 | Refills: 0 | Status: DISCONTINUED | COMMUNITY
Start: 2019-03-14 | End: 2019-04-24

## 2019-04-25 ENCOUNTER — OTHER (OUTPATIENT)
Age: 54
End: 2019-04-25

## 2019-04-25 LAB
ALBUMIN SERPL ELPH-MCNC: 4.4 G/DL
ALP BLD-CCNC: 535 U/L
ALT SERPL-CCNC: 83 U/L
ANION GAP SERPL CALC-SCNC: 15 MMOL/L
AST SERPL-CCNC: 103 U/L
BILIRUB SERPL-MCNC: 0.5 MG/DL
BUN SERPL-MCNC: 14 MG/DL
CALCIUM SERPL-MCNC: 10.2 MG/DL
CANCER AG19-9 SERPL-ACNC: 34 U/ML
CHLORIDE SERPL-SCNC: 96 MMOL/L
CO2 SERPL-SCNC: 27 MMOL/L
CREAT SERPL-MCNC: 0.62 MG/DL
GLUCOSE SERPL-MCNC: 195 MG/DL
POTASSIUM SERPL-SCNC: 4.5 MMOL/L
PROT SERPL-MCNC: 8.3 G/DL
SODIUM SERPL-SCNC: 138 MMOL/L

## 2019-04-25 RX ORDER — OMEPRAZOLE 40 MG/1
40 CAPSULE, DELAYED RELEASE ORAL
Qty: 30 | Refills: 0 | Status: DISCONTINUED | COMMUNITY
Start: 2019-03-14 | End: 2019-04-25

## 2019-04-25 NOTE — HISTORY OF PRESENT ILLNESS
[Disease: _____________________] : Disease: [unfilled] [T: ___] : T[unfilled] [N: ___] : N[unfilled] [AJCC Stage: ____] : AJCC Stage: [unfilled] [M: ___] : M[unfilled] [de-identified] : 54 F w/ DM, HTN diagnosed with ampullary carcinoma in December 2018. \par \par Initially was admitted to hospitals on 12/[facility]/18 with dizziness, found to have elevated LFTs. MRCP demonstrated a dilatation of the CBD. ERCP on 12/22/18 performed c/w ampullary mass with obstruction. Pathology c/w adenocarcinoma. Pt was discharged for out pt follow up with surg onc and med onc but was re-admitted on 1/5/19 with n/v, worsening abdominal pain, found to have severe sepsis with E coli bacteremia 2/2 acute cholangitis. \par 1/7: ERCP performed. Stent was removed from the biliary tree. One plastic stent placed\par 1/5 CT A/P Heterogeneity of the right hepatic lobe with questionable 1.3 cm hypoattenuating lesion in segment 7.\par 1/8 MRI Abd: No suspicious liver lesions. No abnormality is identified to correspond with questionable lesion identified on prior CT. 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  Taken to the OR for evacuation of hematoma, cessation of gastroduodenal artery bleeding with two hemostatic stitches, and placement of abthera VAC.  Required pressors in the SICU, then developed fever. \par 1/21-1/22: OR exploratory laparotomy, drainage of abdominal abscess, repair of abdominal wall hernia with Strattice mesh, b/ abdominal wall advancement flaps. Post-op patient remained intubated and off vasopressors. FENA calculated to be pre-renal. 500CC albumin bolus given, urine output improved. 1/22/19 attempted spontaneous breathing trials but pt hypoxic. Pt lactate uptrending, resuscitated w/IVF, and pt hypotensive requiring pressors. UCx growing candida, Diflucan changed to micafungin to r/o resistant organisms in the setting of worsening sepsis. \par 1/23/19, patient noted to have blood oozing from midline incision while hypotensive and on pressors. Peak pressures noted to be elevated on vent and abdomen increasingly distended. All staples removed and a large amount of clot evacuated from midline incision. 1U PRBC given. No active bleeding appreciated. Fascia remains closed. Wound packed with wet to dry dressing. Vitamin K given for elevated INR. \par 1/24-1/29: remained in SICU, started on TPN, diuresed, finally extubated. \par 2/1: septic shock again, pressors restarted\par 2/2-2/11: CT scan with peritonitis, new abscesses. Ab and Antifungals started. Wound with increase in drainage. Octreotide started for increased output \par 2/12: drainage of abscesses by IR.  \par 2/18: Wound was partially closed by plastics with interrupted sutures and an ostomy appliance was placed, VAC removed.\par 3/1/19. d/c home \par  [de-identified] : adenocarcinoma [de-identified] : CA [id number] [de-identified] : FINAL PATHOLOGY:\par -Invasive adenocarcinoma of the duodenal ampulla (Tumor size: 2.5 x 1.5 x 0.8 cm); Tumor stage: pT3b pN1\par -Vascular and perineural invasion identified\par -1/24 lymph nodes involved by adenocarcinoma\par -Negative margins\par -S/p cholecystectomy ; acute cholecystitis with cholelithiasis \par -Bile duct margin with acute inflammation and focal atypia consistent with low grade dysplasia. \par  [de-identified] : Mae presents for f/u. Had MRI Abd which did not show any suspicious liver lesions. \kritsine Does not feel well. Had a bumpy car ride and feels nauseous right now. Very weak in general today. \par Per daughter she is active at home, but need to rest frequently. \par shoulder continues to bother her during the day and night. Had cortisone injection but this was not very helpful.  \par Appetite is poor. + early satiety. Lost 5 lbs since last visit. \par Abdominal pain has improved overall, takes Oxycodone 1 tab during the day.  \par Gets constipated when takes Oxycodone despite stool softeners. Took Miralax only once and reports it was not helpful.

## 2019-04-25 NOTE — REASON FOR VISIT
[Family Member] : family member [Follow-Up Visit] : a follow-up [FreeTextEntry2] : Ampullary carcinoma

## 2019-04-25 NOTE — CONSULT LETTER
[Dear  ___] : Dear  [unfilled], [Courtesy Letter:] : I had the pleasure of seeing your patient, [unfilled], in my office today. [Consult Closing:] : Thank you very much for allowing me to participate in the care of this patient.  If you have any questions, please do not hesitate to contact me. [Please see my note below.] : Please see my note below. [Sincerely,] : Sincerely, [FreeTextEntry3] : Katty Navarro D.O. \par Attending Physician \par [facility] Medical Oncology [facility] H[facility]ology\par  \par [facility] \par Tel: [phone]\par Fax: [phone]\par

## 2019-04-25 NOTE — PHYSICAL EXAM
[Restricted in physically strenuous activity but ambulatory and able to carry out work of a light or sedentary nature] : Status 1- Restricted in physically strenuous activity but ambulatory and able to carry out work of a light or sedentary nature, e.g., light house work, office work [Thin] : thin [Normal] : affect appropriate [de-identified] : appears uncomfortable  [de-identified] : L shoulder tenderness and limited ROM

## 2019-04-27 ENCOUNTER — RX RENEWAL (OUTPATIENT)
Age: 54
End: 2019-04-27

## 2019-04-30 ENCOUNTER — RESULT REVIEW (OUTPATIENT)
Age: 54
End: 2019-04-30

## 2019-04-30 ENCOUNTER — APPOINTMENT (OUTPATIENT)
Age: 54
End: 2019-04-30

## 2019-04-30 ENCOUNTER — APPOINTMENT (OUTPATIENT)
Dept: SURGICAL ONCOLOGY | Facility: CLINIC | Age: 54
End: 2019-04-30
Payer: MEDICAID

## 2019-04-30 VITALS
OXYGEN SATURATION: 98 % | SYSTOLIC BLOOD PRESSURE: 114 MMHG | HEART RATE: 75 BPM | WEIGHT: 106 LBS | HEIGHT: 59 IN | TEMPERATURE: 95.6 F | DIASTOLIC BLOOD PRESSURE: 76 MMHG | BODY MASS INDEX: 21.37 KG/M2

## 2019-04-30 LAB
BASOPHILS # BLD AUTO: 0.1 K/UL — SIGNIFICANT CHANGE UP (ref 0–0.2)
BASOPHILS NFR BLD AUTO: 0.6 % — SIGNIFICANT CHANGE UP (ref 0–2)
EOSINOPHIL # BLD AUTO: 0.3 K/UL — SIGNIFICANT CHANGE UP (ref 0–0.5)
EOSINOPHIL NFR BLD AUTO: 3.6 % — SIGNIFICANT CHANGE UP (ref 0–6)
HCT VFR BLD CALC: 35.3 % — SIGNIFICANT CHANGE UP (ref 34.5–45)
HGB BLD-MCNC: 11.4 G/DL — LOW (ref 11.5–15.5)
LYMPHOCYTES # BLD AUTO: 2.6 K/UL — SIGNIFICANT CHANGE UP (ref 1–3.3)
LYMPHOCYTES # BLD AUTO: 30.3 % — SIGNIFICANT CHANGE UP (ref 13–44)
MCHC RBC-ENTMCNC: 25.6 PG — LOW (ref 27–34)
MCHC RBC-ENTMCNC: 32.4 G/DL — SIGNIFICANT CHANGE UP (ref 32–36)
MCV RBC AUTO: 79 FL — LOW (ref 80–100)
MONOCYTES # BLD AUTO: 0.7 K/UL — SIGNIFICANT CHANGE UP (ref 0–0.9)
MONOCYTES NFR BLD AUTO: 8.2 % — SIGNIFICANT CHANGE UP (ref 2–14)
NEUTROPHILS # BLD AUTO: 5 K/UL — SIGNIFICANT CHANGE UP (ref 1.8–7.4)
NEUTROPHILS NFR BLD AUTO: 57.4 % — SIGNIFICANT CHANGE UP (ref 43–77)
PLATELET # BLD AUTO: 157 K/UL — SIGNIFICANT CHANGE UP (ref 150–400)
RBC # BLD: 4.47 M/UL — SIGNIFICANT CHANGE UP (ref 3.8–5.2)
RBC # FLD: 12.9 % — SIGNIFICANT CHANGE UP (ref 10.3–14.5)
WBC # BLD: 8.8 K/UL — SIGNIFICANT CHANGE UP (ref 3.8–10.5)
WBC # FLD AUTO: 8.8 K/UL — SIGNIFICANT CHANGE UP (ref 3.8–10.5)

## 2019-04-30 PROCEDURE — 99214 OFFICE O/P EST MOD 30 MIN: CPT

## 2019-04-30 RX ORDER — OMEPRAZOLE 10 MG/1
1 CAPSULE, DELAYED RELEASE ORAL
Qty: 0 | Refills: 0 | COMMUNITY

## 2019-04-30 RX ORDER — DULOXETINE HYDROCHLORIDE 30 MG/1
1 CAPSULE, DELAYED RELEASE ORAL
Qty: 0 | Refills: 0 | COMMUNITY

## 2019-04-30 RX ORDER — PIOGLITAZONE HYDROCHLORIDE 15 MG/1
1 TABLET ORAL
Qty: 0 | Refills: 0 | COMMUNITY

## 2019-05-01 DIAGNOSIS — Z51.11 ENCOUNTER FOR ANTINEOPLASTIC CHEMOTHERAPY: ICD-10-CM

## 2019-05-01 DIAGNOSIS — R11.2 NAUSEA WITH VOMITING, UNSPECIFIED: ICD-10-CM

## 2019-05-03 ENCOUNTER — OUTPATIENT (OUTPATIENT)
Dept: OUTPATIENT SERVICES | Facility: HOSPITAL | Age: 54
LOS: 1 days | Discharge: HOME | End: 2019-05-03

## 2019-05-03 DIAGNOSIS — C24.1 MALIGNANT NEOPLASM OF AMPULLA OF VATER: ICD-10-CM

## 2019-05-07 ENCOUNTER — RESULT REVIEW (OUTPATIENT)
Age: 54
End: 2019-05-07

## 2019-05-07 ENCOUNTER — APPOINTMENT (OUTPATIENT)
Age: 54
End: 2019-05-07

## 2019-05-07 LAB
BASOPHILS # BLD AUTO: 0 K/UL — SIGNIFICANT CHANGE UP (ref 0–0.2)
BASOPHILS NFR BLD AUTO: 0.3 % — SIGNIFICANT CHANGE UP (ref 0–2)
EOSINOPHIL # BLD AUTO: 0.3 K/UL — SIGNIFICANT CHANGE UP (ref 0–0.5)
EOSINOPHIL NFR BLD AUTO: 5.2 % — SIGNIFICANT CHANGE UP (ref 0–6)
HCT VFR BLD CALC: 32.4 % — LOW (ref 34.5–45)
HGB BLD-MCNC: 11.4 G/DL — LOW (ref 11.5–15.5)
LYMPHOCYTES # BLD AUTO: 2.8 K/UL — SIGNIFICANT CHANGE UP (ref 1–3.3)
LYMPHOCYTES # BLD AUTO: 49.8 % — HIGH (ref 13–44)
MCHC RBC-ENTMCNC: 27.4 PG — SIGNIFICANT CHANGE UP (ref 27–34)
MCHC RBC-ENTMCNC: 35.2 G/DL — SIGNIFICANT CHANGE UP (ref 32–36)
MCV RBC AUTO: 77.8 FL — LOW (ref 80–100)
MONOCYTES # BLD AUTO: 0.5 K/UL — SIGNIFICANT CHANGE UP (ref 0–0.9)
MONOCYTES NFR BLD AUTO: 8.6 % — SIGNIFICANT CHANGE UP (ref 2–14)
NEUTROPHILS # BLD AUTO: 2 K/UL — SIGNIFICANT CHANGE UP (ref 1.8–7.4)
NEUTROPHILS NFR BLD AUTO: 36.1 % — LOW (ref 43–77)
PLATELET # BLD AUTO: 119 K/UL — LOW (ref 150–400)
RBC # BLD: 4.16 M/UL — SIGNIFICANT CHANGE UP (ref 3.8–5.2)
RBC # FLD: 12.6 % — SIGNIFICANT CHANGE UP (ref 10.3–14.5)
WBC # BLD: 5.5 K/UL — SIGNIFICANT CHANGE UP (ref 3.8–10.5)
WBC # FLD AUTO: 5.5 K/UL — SIGNIFICANT CHANGE UP (ref 3.8–10.5)

## 2019-05-08 DIAGNOSIS — Z51.11 ENCOUNTER FOR ANTINEOPLASTIC CHEMOTHERAPY: ICD-10-CM

## 2019-05-08 DIAGNOSIS — R11.2 NAUSEA WITH VOMITING, UNSPECIFIED: ICD-10-CM

## 2019-05-13 ENCOUNTER — CLINICAL ADVICE (OUTPATIENT)
Age: 54
End: 2019-05-13

## 2019-05-14 ENCOUNTER — APPOINTMENT (OUTPATIENT)
Dept: HEMATOLOGY ONCOLOGY | Facility: CLINIC | Age: 54
End: 2019-05-14
Payer: MEDICAID

## 2019-05-14 VITALS
TEMPERATURE: 97.7 F | SYSTOLIC BLOOD PRESSURE: 110 MMHG | DIASTOLIC BLOOD PRESSURE: 70 MMHG | RESPIRATION RATE: 16 BRPM | WEIGHT: 108.03 LBS | OXYGEN SATURATION: 99 % | BODY MASS INDEX: 21.82 KG/M2 | HEART RATE: 86 BPM

## 2019-05-14 PROCEDURE — 99214 OFFICE O/P EST MOD 30 MIN: CPT

## 2019-05-16 RX ORDER — MIRTAZAPINE 15 MG/1
15 TABLET, FILM COATED ORAL
Qty: 15 | Refills: 0 | Status: DISCONTINUED | COMMUNITY
Start: 2019-04-24 | End: 2019-05-14

## 2019-05-19 NOTE — HISTORY OF PRESENT ILLNESS
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 \par \par Initially was admitted to [facility] on 12/18/18 with dizziness, found to have elevated LFTs. MRCP demonstrated a dilatation of the CBD. ERCP on 12/22/18 performed c/w ampullary mass with obstruction. Pathology c/w adenocarcinoma. Pt was discharged for out pt follow up with surg onc and med onc but was re-admitted on 1/5/19 with n/v, worsening abdominal pain, found to have severe sepsis with E coli bacteremia 2/2 acute cholangitis. \par 1/7: ERCP performed. Stent was removed from the biliary tree. One plastic stent placed\par 1/5 CT A/P Heterogeneity of the right hepatic lobe with questionable 1.3 cm hypoattenuating lesion in segment 7.\par 1/8 MRI Abd: No suspicious liver lesions. No abnormality is identified to correspond with questionable lesion identified on prior CT. 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  Taken to the OR for evacuation of hematoma, cessation of gastroduodenal artery bleeding with two hemostatic stitches, and placement of abthera VAC.  Required pressors in the SICU, then developed fever. \par 1/21-1/22: OR exploratory laparotomy, drainage of abdominal abscess, repair of abdominal wall hernia with Strattice mesh, b/ abdominal wall advancement flaps. Post-op patient remained intubated and off vasopressors. FENA calculated to be pre-renal. 500CC albumin bolus given, urine output improved. 1/22/19 attempted spontaneous breathing trials but pt hypoxic. Pt lactate uptrending, resuscitated w/IVF, and pt hypotensive requiring pressors. UCx growing candida, Diflucan changed to micafungin to r/o resistant organisms in the setting of worsening sepsis. \par 1/23/19, patient noted to have blood oozing from midline incision while hypotensive and on pressors. Peak pressures noted to be elevated on vent and abdomen increasingly distended. All staples removed and a large amount of clot evacuated from midline incision. 1U PRBC given. No active bleeding appreciated. Fascia remains closed. Wound packed with wet to dry dressing. Vitamin K given for elevated INR. \par 1/24-1/29: remained in SICU, started on TPN, diuresed, finally extubated. \par 2/1: septic shock again, pressors restarted\par 2/2-2/11: CT scan with peritonitis, new abscesses. Ab and Antifungals started. Wound with increase in drainage. Octreotide started for increased output \par 2/12: drainage of abscesses by IR.  \par 2/18: Wound was partially closed by plastics with interrupted sutures and an ostomy appliance was placed, VAC removed.\par 3/1/19. d/c home \par \par 4/30/19: C1D1 Gemzar/Xeloda \par 5/7/19: C1D8 Gemzar/Xeloda  [de-identified] : adenocarcinoma [de-identified] : CA 19-9 34 [de-identified] : Completed first cycle of [place]/Xeloda. c/o persistent severe abdominal pain, that makes walking difficulty. 9/10 today. Describes as a pulling, stretching inside. Having normal BMs, passing gas, no N/V. Feels abdomen is more distended. Passing gas improves symptoms. \par Appetite is poor but has gained 2 lbs since last visit. \par Remains ambulatory \par L Shoulder pain has become more severe. Cannot move arm at all now or lay on that side. Oxycodone does not help. Daughter wanted to call ortho to move up f/u apt but does not have the office number.  [de-identified] : FINAL PATHOLOGY:\par -Invasive adenocarcinoma of the duodenal ampulla (Tumor size: 2.5 x 1.5 x 0.8 cm); Tumor stage: pT3b pN1\par -Vascular and perineural invasion identified\par -1/24 lymph nodes involved by adenocarcinoma\par -Negative margins\par -S/p cholecystectomy ; acute cholecystitis with cholelithiasis \par -Bile duct margin with acute inflammation and focal atypia consistent with low grade dysplasia. \par

## 2019-05-19 NOTE — PHYSICAL EXAM
[Restricted in physically strenuous activity but ambulatory and able to carry out work of a light or sedentary nature] : Status 1- Restricted in physically strenuous activity but ambulatory and able to carry out work of a light or sedentary nature, e.g., light house work, office work [Normal] : affect appropriate [de-identified] : appears uncomfortable  [de-identified] : soft, NT on palpation, + BS [de-identified] : L shoulder TTP and limited ROM due to pain

## 2019-05-19 NOTE — REVIEW OF SYSTEMS
[Abdominal Pain] : abdominal pain [Joint Stiffness] : joint stiffness [Joint Pain] : joint pain [Negative] : Allergic/Immunologic

## 2019-05-19 NOTE — REASON FOR VISIT
[Follow-Up Visit] : a follow-up [Family Member] : family member [FreeTextEntry2] : Ampullary cancer s/p resection

## 2019-05-21 ENCOUNTER — RESULT REVIEW (OUTPATIENT)
Age: 54
End: 2019-05-21

## 2019-05-21 ENCOUNTER — FORM ENCOUNTER (OUTPATIENT)
Age: 54
End: 2019-05-21

## 2019-05-21 ENCOUNTER — APPOINTMENT (OUTPATIENT)
Age: 54
End: 2019-05-21

## 2019-05-21 LAB
EOSINOPHIL # BLD AUTO: 0.3 K/UL — SIGNIFICANT CHANGE UP (ref 0–0.5)
EOSINOPHIL NFR BLD AUTO: 2 % — SIGNIFICANT CHANGE UP (ref 0–6)
HCT VFR BLD CALC: 31 % — LOW (ref 34.5–45)
HGB BLD-MCNC: 11.2 G/DL — LOW (ref 11.5–15.5)
LYMPHOCYTES # BLD AUTO: 2.4 K/UL — SIGNIFICANT CHANGE UP (ref 1–3.3)
LYMPHOCYTES # BLD AUTO: 49 % — HIGH (ref 13–44)
MCHC RBC-ENTMCNC: 28 PG — SIGNIFICANT CHANGE UP (ref 27–34)
MCHC RBC-ENTMCNC: 36.1 G/DL — HIGH (ref 32–36)
MCV RBC AUTO: 77.4 FL — LOW (ref 80–100)
MONOCYTES # BLD AUTO: 1.2 K/UL — HIGH (ref 0–0.9)
MONOCYTES NFR BLD AUTO: 9 % — SIGNIFICANT CHANGE UP (ref 2–14)
NEUTROPHILS # BLD AUTO: 2.8 K/UL — SIGNIFICANT CHANGE UP (ref 1.8–7.4)
NEUTROPHILS NFR BLD AUTO: 40 % — LOW (ref 43–77)
PLAT MORPH BLD: NORMAL — SIGNIFICANT CHANGE UP
PLATELET # BLD AUTO: 252 K/UL — SIGNIFICANT CHANGE UP (ref 150–400)
RBC # BLD: 4.01 M/UL — SIGNIFICANT CHANGE UP (ref 3.8–5.2)
RBC # FLD: 14.3 % — SIGNIFICANT CHANGE UP (ref 10.3–14.5)
RBC BLD AUTO: SIGNIFICANT CHANGE UP
WBC # BLD: 6.7 K/UL — SIGNIFICANT CHANGE UP (ref 3.8–10.5)
WBC # FLD AUTO: 6.7 K/UL — SIGNIFICANT CHANGE UP (ref 3.8–10.5)

## 2019-05-22 ENCOUNTER — APPOINTMENT (OUTPATIENT)
Dept: CT IMAGING | Facility: IMAGING CENTER | Age: 54
End: 2019-05-22
Payer: MEDICAID

## 2019-05-22 ENCOUNTER — OUTPATIENT (OUTPATIENT)
Dept: OUTPATIENT SERVICES | Facility: HOSPITAL | Age: 54
LOS: 1 days | End: 2019-05-22
Payer: MEDICAID

## 2019-05-22 DIAGNOSIS — C24.1 MALIGNANT NEOPLASM OF AMPULLA OF VATER: ICD-10-CM

## 2019-05-22 PROCEDURE — 74177 CT ABD & PELVIS W/CONTRAST: CPT | Mod: 26

## 2019-05-22 PROCEDURE — 74177 CT ABD & PELVIS W/CONTRAST: CPT

## 2019-05-28 ENCOUNTER — RESULT REVIEW (OUTPATIENT)
Age: 54
End: 2019-05-28

## 2019-05-28 ENCOUNTER — APPOINTMENT (OUTPATIENT)
Age: 54
End: 2019-05-28

## 2019-05-28 ENCOUNTER — APPOINTMENT (OUTPATIENT)
Dept: HEMATOLOGY ONCOLOGY | Facility: CLINIC | Age: 54
End: 2019-05-28
Payer: MEDICAID

## 2019-05-28 ENCOUNTER — LABORATORY RESULT (OUTPATIENT)
Age: 54
End: 2019-05-28

## 2019-05-28 VITALS
BODY MASS INDEX: 21.84 KG/M2 | RESPIRATION RATE: 16 BRPM | SYSTOLIC BLOOD PRESSURE: 117 MMHG | DIASTOLIC BLOOD PRESSURE: 77 MMHG | OXYGEN SATURATION: 100 % | WEIGHT: 108.11 LBS | TEMPERATURE: 98.4 F | HEART RATE: 68 BPM

## 2019-05-28 LAB
BASOPHILS # BLD AUTO: 0 K/UL — SIGNIFICANT CHANGE UP (ref 0–0.2)
BASOPHILS NFR BLD AUTO: 0.9 % — SIGNIFICANT CHANGE UP (ref 0–2)
EOSINOPHIL # BLD AUTO: 0.1 K/UL — SIGNIFICANT CHANGE UP (ref 0–0.5)
EOSINOPHIL NFR BLD AUTO: 2.3 % — SIGNIFICANT CHANGE UP (ref 0–6)
HCT VFR BLD CALC: 28.6 % — LOW (ref 34.5–45)
HGB BLD-MCNC: 10.3 G/DL — LOW (ref 11.5–15.5)
LYMPHOCYTES # BLD AUTO: 2.4 K/UL — SIGNIFICANT CHANGE UP (ref 1–3.3)
LYMPHOCYTES # BLD AUTO: 60 % — HIGH (ref 13–44)
MCHC RBC-ENTMCNC: 27.3 PG — SIGNIFICANT CHANGE UP (ref 27–34)
MCHC RBC-ENTMCNC: 36.1 G/DL — HIGH (ref 32–36)
MCV RBC AUTO: 75.7 FL — LOW (ref 80–100)
MONOCYTES # BLD AUTO: 0.4 K/UL — SIGNIFICANT CHANGE UP (ref 0–0.9)
MONOCYTES NFR BLD AUTO: 11 % — SIGNIFICANT CHANGE UP (ref 2–14)
NEUTROPHILS # BLD AUTO: 1 K/UL — LOW (ref 1.8–7.4)
NEUTROPHILS NFR BLD AUTO: 25.8 % — LOW (ref 43–77)
PLATELET # BLD AUTO: 157 K/UL — SIGNIFICANT CHANGE UP (ref 150–400)
RBC # BLD: 3.77 M/UL — LOW (ref 3.8–5.2)
RBC # FLD: 14 % — SIGNIFICANT CHANGE UP (ref 10.3–14.5)
WBC # BLD: 4 K/UL — SIGNIFICANT CHANGE UP (ref 3.8–10.5)
WBC # FLD AUTO: 4 K/UL — SIGNIFICANT CHANGE UP (ref 3.8–10.5)

## 2019-05-28 PROCEDURE — 99214 OFFICE O/P EST MOD 30 MIN: CPT

## 2019-05-29 ENCOUNTER — OUTPATIENT (OUTPATIENT)
Dept: OUTPATIENT SERVICES | Facility: HOSPITAL | Age: 54
LOS: 1 days | End: 2019-05-29

## 2019-05-29 ENCOUNTER — APPOINTMENT (OUTPATIENT)
Dept: ORTHOPEDIC SURGERY | Facility: HOSPITAL | Age: 54
End: 2019-05-29

## 2019-05-29 VITALS
BODY MASS INDEX: 22.02 KG/M2 | DIASTOLIC BLOOD PRESSURE: 65 MMHG | HEART RATE: 70 BPM | SYSTOLIC BLOOD PRESSURE: 113 MMHG | WEIGHT: 109 LBS

## 2019-05-30 DIAGNOSIS — M75.02 ADHESIVE CAPSULITIS OF LEFT SHOULDER: ICD-10-CM

## 2019-05-30 DIAGNOSIS — M24.812 OTHER SPECIFIC JOINT DERANGEMENTS OF LEFT SHOULDER, NOT ELSEWHERE CLASSIFIED: ICD-10-CM

## 2019-06-06 ENCOUNTER — OUTPATIENT (OUTPATIENT)
Dept: OUTPATIENT SERVICES | Facility: HOSPITAL | Age: 54
LOS: 1 days | Discharge: HOME | End: 2019-06-06

## 2019-06-06 DIAGNOSIS — C24.1 MALIGNANT NEOPLASM OF AMPULLA OF VATER: ICD-10-CM

## 2019-06-06 NOTE — PHYSICAL EXAM
[Restricted in physically strenuous activity but ambulatory and able to carry out work of a light or sedentary nature] : Status 1- Restricted in physically strenuous activity but ambulatory and able to carry out work of a light or sedentary nature, e.g., light house work, office work [Normal] : affect appropriate [de-identified] : appears uncomfortable  [de-identified] : soft, NT on palpation, + BS [de-identified] : L shoulder TTP and limited ROM due to pain

## 2019-06-06 NOTE — REVIEW OF SYSTEMS
[Fatigue] : fatigue [Abdominal Pain] : abdominal pain [Joint Pain] : joint pain [Joint Stiffness] : joint stiffness [Negative] : Allergic/Immunologic

## 2019-06-06 NOTE — CONSULT LETTER
[Dear  ___] : Dear  [unfilled], [Courtesy Letter:] : I had the pleasure of seeing your patient, [unfilled], in my office today. [Consult Closing:] : Thank you very much for allowing me to participate in the care of this patient.  If you have any questions, please do not hesitate to contact me. [Please see my note below.] : Please see my note below. [Sincerely,] : Sincerely, [FreeTextEntry3] : Katty Navarro D.O. \par Attending Physician \par [facility] Oncology and H[facility]ology\par  \par [facility] \par Tel: [phone]\par Fax: [phone]\par

## 2019-06-06 NOTE — HISTORY OF PRESENT ILLNESS
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 \par \par Initially was admitted to [facility] on 12/18/18 with dizziness, found to have elevated LFTs. MRCP demonstrated a dilatation of the CBD. ERCP on 12/22/18 performed c/w ampullary mass with obstruction. Pathology c/w adenocarcinoma. Pt was discharged for out pt follow up with surg onc and med onc but was re-admitted on 1/5/19 with n/v, worsening abdominal pain, found to have severe sepsis with E coli bacteremia 2/2 acute cholangitis. \par 1/7: ERCP performed. Stent was removed from the biliary tree. One plastic stent placed\par 1/5 CT A/P Heterogeneity of the right hepatic lobe with questionable 1.3 cm hypoattenuating lesion in segment 7.\par 1/8 MRI Abd: No suspicious liver lesions. No abnormality is identified to correspond with questionable lesion identified on prior CT. 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  Taken to the OR for evacuation of hematoma, cessation of gastroduodenal artery bleeding with two hemostatic stitches, and placement of abthera VAC.  Required pressors in the SICU, then developed fever. \par 1/21-1/22: OR exploratory laparotomy, drainage of abdominal abscess, repair of abdominal wall hernia with Strattice mesh, b/ abdominal wall advancement flaps. Post-op patient remained intubated and off vasopressors. FENA calculated to be pre-renal. 500CC albumin bolus given, urine output improved. 1/22/19 attempted spontaneous breathing trials but pt hypoxic. Pt lactate uptrending, resuscitated w/IVF, and pt hypotensive requiring pressors. UCx growing candida, Diflucan changed to micafungin to r/o resistant organisms in the setting of worsening sepsis. \par 1/23/19, patient noted to have blood oozing from midline incision while hypotensive and on pressors. Peak pressures noted to be elevated on vent and abdomen increasingly distended. All staples removed and a large amount of clot evacuated from midline incision. 1U PRBC given. No active bleeding appreciated. Fascia remains closed. Wound packed with wet to dry dressing. Vitamin K given for elevated INR. \par 1/24-1/29: remained in SICU, started on TPN, diuresed, finally extubated. \par 2/1: septic shock again, pressors restarted\par 2/2-2/11: CT scan with peritonitis, new abscesses. Ab and Antifungals started. Wound with increase in drainage. Octreotide started for increased output \par 2/12: drainage of abscesses by IR.  \par 2/18: Wound was partially closed by plastics with interrupted sutures and an ostomy appliance was placed, VAC removed.\par 3/1/19. d/c home \par \par 4/30/19: C1D1 Gemzar/Xeloda \par 5/7/19: C1D8 Gemzar/Xeloda \par 5/21/19: C2D1\par 5/28/19: C2D8 [de-identified] : adenocarcinoma [de-identified] : CA 19-9 34 [de-identified] : FINAL PATHOLOGY:\par -Invasive adenocarcinoma of the duodenal ampulla (Tumor size: 2.5 x 1.5 x 0.8 cm); Tumor stage: pT3b pN1\par -Vascular and perineural invasion identified\par -1/24 lymph nodes involved by adenocarcinoma\par -Negative margins\par -S/p cholecystectomy ; acute cholecystitis with cholelithiasis \par -Bile duct margin with acute inflammation and focal atypia consistent with low grade dysplasia. \par  [de-identified] : c/o poor appetite, eating smaller portions. Weight is stable though. \par cont to experience diffuse abdominal pain that is not relieved by oxycodone. Cannot describe pain well. Feels like pulling sensation. \par L shoulder pain ongoing. Seeing ortho tomorrow. Cannot move L arm well. Having difficulty moving. \par No constipation. Moving bowels daily. \par no n/v. denies any rash on hands or feet.

## 2019-06-11 ENCOUNTER — APPOINTMENT (OUTPATIENT)
Age: 54
End: 2019-06-11

## 2019-06-11 ENCOUNTER — RESULT REVIEW (OUTPATIENT)
Age: 54
End: 2019-06-11

## 2019-06-11 ENCOUNTER — LABORATORY RESULT (OUTPATIENT)
Age: 54
End: 2019-06-11

## 2019-06-11 LAB
BASOPHILS # BLD AUTO: 0.1 K/UL — SIGNIFICANT CHANGE UP (ref 0–0.2)
BASOPHILS NFR BLD AUTO: 0.7 % — SIGNIFICANT CHANGE UP (ref 0–2)
EOSINOPHIL # BLD AUTO: 0.2 K/UL — SIGNIFICANT CHANGE UP (ref 0–0.5)
EOSINOPHIL NFR BLD AUTO: 1.6 % — SIGNIFICANT CHANGE UP (ref 0–6)
HCT VFR BLD CALC: 30 % — LOW (ref 34.5–45)
HGB BLD-MCNC: 10.7 G/DL — LOW (ref 11.5–15.5)
LYMPHOCYTES # BLD AUTO: 2.7 K/UL — SIGNIFICANT CHANGE UP (ref 1–3.3)
LYMPHOCYTES # BLD AUTO: 27.9 % — SIGNIFICANT CHANGE UP (ref 13–44)
MCHC RBC-ENTMCNC: 27.8 PG — SIGNIFICANT CHANGE UP (ref 27–34)
MCHC RBC-ENTMCNC: 35.5 G/DL — SIGNIFICANT CHANGE UP (ref 32–36)
MCV RBC AUTO: 78.2 FL — LOW (ref 80–100)
MONOCYTES # BLD AUTO: 1.2 K/UL — HIGH (ref 0–0.9)
MONOCYTES NFR BLD AUTO: 12.6 % — SIGNIFICANT CHANGE UP (ref 2–14)
NEUTROPHILS # BLD AUTO: 5.4 K/UL — SIGNIFICANT CHANGE UP (ref 1.8–7.4)
NEUTROPHILS NFR BLD AUTO: 57.1 % — SIGNIFICANT CHANGE UP (ref 43–77)
PLATELET # BLD AUTO: 137 K/UL — LOW (ref 150–400)
RBC # BLD: 3.84 M/UL — SIGNIFICANT CHANGE UP (ref 3.8–5.2)
RBC # FLD: 18.8 % — HIGH (ref 10.3–14.5)
WBC # BLD: 9.5 K/UL — SIGNIFICANT CHANGE UP (ref 3.8–10.5)
WBC # FLD AUTO: 9.5 K/UL — SIGNIFICANT CHANGE UP (ref 3.8–10.5)

## 2019-06-11 RX ORDER — ATORVASTATIN CALCIUM 80 MG/1
1 TABLET, FILM COATED ORAL
Qty: 0 | Refills: 0 | DISCHARGE

## 2019-06-12 DIAGNOSIS — Z51.11 ENCOUNTER FOR ANTINEOPLASTIC CHEMOTHERAPY: ICD-10-CM

## 2019-06-12 DIAGNOSIS — R11.2 NAUSEA WITH VOMITING, UNSPECIFIED: ICD-10-CM

## 2019-06-17 ENCOUNTER — APPOINTMENT (OUTPATIENT)
Dept: INTERNAL MEDICINE | Facility: CLINIC | Age: 54
End: 2019-06-17
Payer: MEDICAID

## 2019-06-17 ENCOUNTER — LABORATORY RESULT (OUTPATIENT)
Age: 54
End: 2019-06-17

## 2019-06-17 VITALS
TEMPERATURE: 98.3 F | SYSTOLIC BLOOD PRESSURE: 116 MMHG | HEIGHT: 59 IN | DIASTOLIC BLOOD PRESSURE: 64 MMHG | HEART RATE: 80 BPM | WEIGHT: 106 LBS | OXYGEN SATURATION: 99 % | BODY MASS INDEX: 21.37 KG/M2

## 2019-06-17 DIAGNOSIS — Z11.59 ENCOUNTER FOR SCREENING FOR OTHER VIRAL DISEASES: ICD-10-CM

## 2019-06-17 DIAGNOSIS — Z23 ENCOUNTER FOR IMMUNIZATION: ICD-10-CM

## 2019-06-17 DIAGNOSIS — R20.0 ANESTHESIA OF SKIN: ICD-10-CM

## 2019-06-17 PROCEDURE — 99214 OFFICE O/P EST MOD 30 MIN: CPT | Mod: 25

## 2019-06-17 PROCEDURE — 36415 COLL VENOUS BLD VENIPUNCTURE: CPT

## 2019-06-17 PROCEDURE — 94010 BREATHING CAPACITY TEST: CPT

## 2019-06-17 RX ORDER — OXYCODONE 5 MG/1
5 TABLET ORAL
Qty: 60 | Refills: 0 | Status: DISCONTINUED | COMMUNITY
Start: 2019-03-14 | End: 2019-06-17

## 2019-06-17 RX ORDER — FAMOTIDINE 20 MG/1
20 TABLET, FILM COATED ORAL
Qty: 30 | Refills: 3 | Status: DISCONTINUED | COMMUNITY
Start: 2019-04-25 | End: 2019-06-17

## 2019-06-17 RX ORDER — LIDOCAINE 5% 700 MG/1
5 PATCH TOPICAL
Qty: 1 | Refills: 0 | Status: DISCONTINUED | COMMUNITY
Start: 2019-05-14 | End: 2019-06-17

## 2019-06-17 RX ORDER — LIDOCAINE AND PRILOCAINE 25; 25 MG/G; MG/G
2.5-2.5 CREAM TOPICAL
Qty: 1 | Refills: 1 | Status: DISCONTINUED | COMMUNITY
Start: 2019-04-24 | End: 2019-06-17

## 2019-06-17 NOTE — HISTORY OF PRESENT ILLNESS
[FreeTextEntry1] : DM [de-identified] : pt accompanied by her daughter, Olegario\par DM- last visit stopped all DM meds.  HA1C 5.7.  no CP \par chronic cough- still has post nasal drip.  50% better. \par CT lung 3/2019- nl\par pt stopped omeprazole- it didn't help w the cough\par elev LFT- mild improvement- ampullary cancer.  pt has chronic abd pain\par mild anemia.\par pt has numbness and tingling of her finger and given gabapentin- started Dr. Rober Caro- Rheumatology- pt hasn't seen him for 9 mo.\par as per daughter, numbness started sev yr prior to chemo\par adhesive capsulitis- pt is getting PT\par ampullary Ca- pt is being f/u by Oncology

## 2019-06-17 NOTE — PLAN
[FreeTextEntry1] : pt reminded to f/u with her appt. \par pt states she is on chol meds- pt not eating much- hold chol meds and conor chol in 2 mo\par cough add flonase\par spirometry-nl

## 2019-06-18 ENCOUNTER — LABORATORY RESULT (OUTPATIENT)
Age: 54
End: 2019-06-18

## 2019-06-18 ENCOUNTER — RESULT REVIEW (OUTPATIENT)
Age: 54
End: 2019-06-18

## 2019-06-18 ENCOUNTER — APPOINTMENT (OUTPATIENT)
Age: 54
End: 2019-06-18
Payer: MEDICAID

## 2019-06-18 ENCOUNTER — APPOINTMENT (OUTPATIENT)
Age: 54
End: 2019-06-18

## 2019-06-18 LAB
BASOPHILS # BLD AUTO: 0 K/UL — SIGNIFICANT CHANGE UP (ref 0–0.2)
BASOPHILS NFR BLD AUTO: 0.4 % — SIGNIFICANT CHANGE UP (ref 0–2)
EOSINOPHIL # BLD AUTO: 0.1 K/UL — SIGNIFICANT CHANGE UP (ref 0–0.5)
EOSINOPHIL NFR BLD AUTO: 1.5 % — SIGNIFICANT CHANGE UP (ref 0–6)
HCT VFR BLD CALC: 28.4 % — LOW (ref 34.5–45)
HGB BLD-MCNC: 10.5 G/DL — LOW (ref 11.5–15.5)
LYMPHOCYTES # BLD AUTO: 2.6 K/UL — SIGNIFICANT CHANGE UP (ref 1–3.3)
LYMPHOCYTES # BLD AUTO: 38.3 % — SIGNIFICANT CHANGE UP (ref 13–44)
MCHC RBC-ENTMCNC: 29.4 PG — SIGNIFICANT CHANGE UP (ref 27–34)
MCHC RBC-ENTMCNC: 36.8 G/DL — HIGH (ref 32–36)
MCV RBC AUTO: 80 FL — SIGNIFICANT CHANGE UP (ref 80–100)
MONOCYTES # BLD AUTO: 0.8 K/UL — SIGNIFICANT CHANGE UP (ref 0–0.9)
MONOCYTES NFR BLD AUTO: 12.1 % — SIGNIFICANT CHANGE UP (ref 2–14)
NEUTROPHILS # BLD AUTO: 3.2 K/UL — SIGNIFICANT CHANGE UP (ref 1.8–7.4)
NEUTROPHILS NFR BLD AUTO: 47.7 % — SIGNIFICANT CHANGE UP (ref 43–77)
PLATELET # BLD AUTO: 127 K/UL — LOW (ref 150–400)
RBC # BLD: 3.55 M/UL — LOW (ref 3.8–5.2)
RBC # FLD: 18.2 % — HIGH (ref 10.3–14.5)
WBC # BLD: 6.7 K/UL — SIGNIFICANT CHANGE UP (ref 3.8–10.5)
WBC # FLD AUTO: 6.7 K/UL — SIGNIFICANT CHANGE UP (ref 3.8–10.5)

## 2019-06-18 PROCEDURE — 99214 OFFICE O/P EST MOD 30 MIN: CPT

## 2019-06-19 LAB
ESTIMATED AVERAGE GLUCOSE: 212 MG/DL
HBA1C MFR BLD HPLC: 9 %
VIT B12 SERPL-MCNC: 544 PG/ML

## 2019-06-24 ENCOUNTER — FORM ENCOUNTER (OUTPATIENT)
Age: 54
End: 2019-06-24

## 2019-06-25 ENCOUNTER — APPOINTMENT (OUTPATIENT)
Dept: MAMMOGRAPHY | Facility: IMAGING CENTER | Age: 54
End: 2019-06-25
Payer: MEDICAID

## 2019-06-25 ENCOUNTER — OUTPATIENT (OUTPATIENT)
Dept: OUTPATIENT SERVICES | Facility: HOSPITAL | Age: 54
LOS: 1 days | End: 2019-06-25
Payer: MEDICAID

## 2019-06-25 DIAGNOSIS — Z12.31 ENCOUNTER FOR SCREENING MAMMOGRAM FOR MALIGNANT NEOPLASM OF BREAST: ICD-10-CM

## 2019-06-25 PROCEDURE — 77063 BREAST TOMOSYNTHESIS BI: CPT

## 2019-06-25 PROCEDURE — 77067 SCR MAMMO BI INCL CAD: CPT | Mod: 26

## 2019-06-25 PROCEDURE — 77067 SCR MAMMO BI INCL CAD: CPT

## 2019-06-25 PROCEDURE — 77063 BREAST TOMOSYNTHESIS BI: CPT | Mod: 26

## 2019-06-26 DIAGNOSIS — R92.2 INCONCLUSIVE MAMMOGRAM: ICD-10-CM

## 2019-07-02 ENCOUNTER — LABORATORY RESULT (OUTPATIENT)
Age: 54
End: 2019-07-02

## 2019-07-02 ENCOUNTER — RESULT REVIEW (OUTPATIENT)
Age: 54
End: 2019-07-02

## 2019-07-02 ENCOUNTER — APPOINTMENT (OUTPATIENT)
Age: 54
End: 2019-07-02

## 2019-07-02 ENCOUNTER — FORM ENCOUNTER (OUTPATIENT)
Age: 54
End: 2019-07-02

## 2019-07-02 LAB
BASOPHILS # BLD AUTO: 0 K/UL — SIGNIFICANT CHANGE UP (ref 0–0.2)
EOSINOPHIL # BLD AUTO: 0.1 K/UL — SIGNIFICANT CHANGE UP (ref 0–0.5)
HCT VFR BLD CALC: 29.8 % — LOW (ref 34.5–45)
HGB BLD-MCNC: 10.1 G/DL — LOW (ref 11.5–15.5)
LYMPHOCYTES # BLD AUTO: 2.4 K/UL — SIGNIFICANT CHANGE UP (ref 1–3.3)
LYMPHOCYTES # BLD AUTO: 36 % — SIGNIFICANT CHANGE UP (ref 13–44)
MCHC RBC-ENTMCNC: 28.9 PG — SIGNIFICANT CHANGE UP (ref 27–34)
MCHC RBC-ENTMCNC: 33.9 G/DL — SIGNIFICANT CHANGE UP (ref 32–36)
MCV RBC AUTO: 85.3 FL — SIGNIFICANT CHANGE UP (ref 80–100)
MONOCYTES # BLD AUTO: 1.1 K/UL — HIGH (ref 0–0.9)
MONOCYTES NFR BLD AUTO: 17 % — HIGH (ref 2–14)
NEUTROPHILS # BLD AUTO: 2.9 K/UL — SIGNIFICANT CHANGE UP (ref 1.8–7.4)
NEUTROPHILS NFR BLD AUTO: 47 % — SIGNIFICANT CHANGE UP (ref 43–77)
PLAT MORPH BLD: NORMAL — SIGNIFICANT CHANGE UP
PLATELET # BLD AUTO: 238 K/UL — SIGNIFICANT CHANGE UP (ref 150–400)
RBC # BLD: 3.5 M/UL — LOW (ref 3.8–5.2)
RBC # FLD: 21.1 % — HIGH (ref 10.3–14.5)
RBC BLD AUTO: SIGNIFICANT CHANGE UP
WBC # BLD: 6.6 K/UL — SIGNIFICANT CHANGE UP (ref 3.8–10.5)
WBC # FLD AUTO: 6.6 K/UL — SIGNIFICANT CHANGE UP (ref 3.8–10.5)

## 2019-07-02 NOTE — CONSULT LETTER
[Dear  ___] : Dear  [unfilled], [Please see my note below.] : Please see my note below. [Courtesy Letter:] : I had the pleasure of seeing your patient, [unfilled], in my office today. [Consult Closing:] : Thank you very much for allowing me to participate in the care of this patient.  If you have any questions, please do not hesitate to contact me. [Sincerely,] : Sincerely, [FreeTextEntry3] : Katty Navarro D.O. \par Attending Physician \par [facility] Medical Oncology [facility] Hematology\par  \par [facility] \par Tel: [phone]\par Fax: [phone]\par

## 2019-07-02 NOTE — REVIEW OF SYSTEMS
[Fatigue] : fatigue [Abdominal Pain] : abdominal pain [Constipation] : constipation [Negative] : Allergic/Immunologic

## 2019-07-02 NOTE — REASON FOR VISIT
[Follow-Up Visit] : a follow-up [Family Member] : family member [FreeTextEntry2] : Ampullary cancer s/p resection, adjuvant therapy

## 2019-07-02 NOTE — HISTORY OF PRESENT ILLNESS
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 \par \par Initially was admitted to [facility] on 12/18/18 with dizziness, found to have elevated LFTs. MRCP demonstrated a dilatation of the CBD. ERCP on 12/22/18 performed c/w ampullary mass with obstruction. Pathology c/w adenocarcinoma. Pt was discharged for out pt follow up with surg onc and med onc but was re-admitted on 1/5/19 with n/v, worsening abdominal pain, found to have severe sepsis with E coli bacteremia 2/2 acute cholangitis. \par 1/7: ERCP performed. Stent was removed from the biliary tree. One plastic stent placed\par 1/5 CT A/P Heterogeneity of the right hepatic lobe with questionable 1.3 cm hypoattenuating lesion in segment 7.\par 1/8 MRI Abd: No suspicious liver lesions. No abnormality is identified to correspond with questionable lesion identified on prior CT. 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  Taken to the OR for evacuation of hematoma, cessation of gastroduodenal artery bleeding with two hemostatic stitches, and placement of abthera VAC.  Required pressors in the SICU, then developed fever. \par 1/21-1/22: OR exploratory laparotomy, drainage of abdominal abscess, repair of abdominal wall hernia with Strattice mesh, b/ abdominal wall advancement flaps. Post-op patient remained intubated and off vasopressors. FENA calculated to be pre-renal. 500CC albumin bolus given, urine output improved. 1/22/19 attempted spontaneous breathing trials but pt hypoxic. Pt lactate uptrending, resuscitated w/IVF, and pt hypotensive requiring pressors. UCx growing candida, Diflucan changed to micafungin to r/o resistant organisms in the setting of worsening sepsis. \par 1/23/19, patient noted to have blood oozing from midline incision while hypotensive and on pressors. Peak pressures noted to be elevated on vent and abdomen increasingly distended. All staples removed and a large amount of clot evacuated from midline incision. 1U PRBC given. No active bleeding appreciated. Fascia remains closed. Wound packed with wet to dry dressing. Vitamin K given for elevated INR. \par 1/24-1/29: remained in SICU, started on TPN, diuresed, finally extubated. \par 2/1: septic shock again, pressors restarted\par 2/2-2/11: CT scan with peritonitis, new abscesses. Ab and Antifungals started. Wound with increase in drainage. Octreotide started for increased output \par 2/12: drainage of abscesses by IR.  \par 2/18: Wound was partially closed by plastics with interrupted sutures and an ostomy appliance was placed, VAC removed.\par 3/1/19. d/c home \par \par 4/30/19: C1D1 Gemzar/Xeloda \par 5/7/19: C1D8 Gemzar/Xeloda \par 5/21/19: C2D1\par 5/28/19: C2D8\par 6/11/19: C3D1\par 6/18/19: C3D8 [de-identified] : CA 19-9 34 [de-identified] : adenocarcinoma [de-identified] : FINAL PATHOLOGY:\par -Invasive adenocarcinoma of the duodenal ampulla (Tumor size: 2.5 x 1.5 x 0.8 cm); Tumor stage: pT3b pN1\par -Vascular and perineural invasion identified\par -1/24 lymph nodes involved by adenocarcinoma\par -Negative margins\par -S/p cholecystectomy ; acute cholecystitis with cholelithiasis \par -Bile duct margin with acute inflammation and focal atypia consistent with low grade dysplasia. \par  [de-identified] : c/o persistent abdominal and shoulder pain. Had another injection by ortho a few days ago with mild relief. \par Abdominal pain remains unchanged: sensation of pulling inside, distention. \par + constipation \par Blood sugars have been very high. Has been off all diabetic medications for months now.

## 2019-07-03 ENCOUNTER — OUTPATIENT (OUTPATIENT)
Dept: OUTPATIENT SERVICES | Facility: HOSPITAL | Age: 54
LOS: 1 days | End: 2019-07-03
Payer: MEDICAID

## 2019-07-03 ENCOUNTER — APPOINTMENT (OUTPATIENT)
Dept: ULTRASOUND IMAGING | Facility: IMAGING CENTER | Age: 54
End: 2019-07-03
Payer: MEDICAID

## 2019-07-03 ENCOUNTER — OUTPATIENT (OUTPATIENT)
Dept: OUTPATIENT SERVICES | Facility: HOSPITAL | Age: 54
LOS: 1 days | Discharge: HOME | End: 2019-07-03

## 2019-07-03 DIAGNOSIS — C24.1 MALIGNANT NEOPLASM OF AMPULLA OF VATER: ICD-10-CM

## 2019-07-03 DIAGNOSIS — R92.2 INCONCLUSIVE MAMMOGRAM: ICD-10-CM

## 2019-07-03 PROCEDURE — 76642 ULTRASOUND BREAST LIMITED: CPT | Mod: 26,LT

## 2019-07-03 PROCEDURE — 76642 ULTRASOUND BREAST LIMITED: CPT

## 2019-07-09 ENCOUNTER — RESULT REVIEW (OUTPATIENT)
Age: 54
End: 2019-07-09

## 2019-07-09 ENCOUNTER — APPOINTMENT (OUTPATIENT)
Dept: OPHTHALMOLOGY | Facility: CLINIC | Age: 54
End: 2019-07-09

## 2019-07-09 ENCOUNTER — APPOINTMENT (OUTPATIENT)
Age: 54
End: 2019-07-09

## 2019-07-09 LAB
BASOPHILS # BLD AUTO: 0 K/UL — SIGNIFICANT CHANGE UP (ref 0–0.2)
BASOPHILS NFR BLD AUTO: 1 % — SIGNIFICANT CHANGE UP (ref 0–2)
EOSINOPHIL # BLD AUTO: 0.1 K/UL — SIGNIFICANT CHANGE UP (ref 0–0.5)
EOSINOPHIL NFR BLD AUTO: 2.4 % — SIGNIFICANT CHANGE UP (ref 0–6)
HCT VFR BLD CALC: 27.5 % — LOW (ref 34.5–45)
HGB BLD-MCNC: 9.4 G/DL — LOW (ref 11.5–15.5)
LYMPHOCYTES # BLD AUTO: 2.2 K/UL — SIGNIFICANT CHANGE UP (ref 1–3.3)
LYMPHOCYTES # BLD AUTO: 49 % — HIGH (ref 13–44)
MCHC RBC-ENTMCNC: 29.2 PG — SIGNIFICANT CHANGE UP (ref 27–34)
MCHC RBC-ENTMCNC: 34 G/DL — SIGNIFICANT CHANGE UP (ref 32–36)
MCV RBC AUTO: 85.9 FL — SIGNIFICANT CHANGE UP (ref 80–100)
MONOCYTES # BLD AUTO: 0.6 K/UL — SIGNIFICANT CHANGE UP (ref 0–0.9)
MONOCYTES NFR BLD AUTO: 14.6 % — HIGH (ref 2–14)
NEUTROPHILS # BLD AUTO: 1.5 K/UL — LOW (ref 1.8–7.4)
NEUTROPHILS NFR BLD AUTO: 33.1 % — LOW (ref 43–77)
PLATELET # BLD AUTO: 147 K/UL — LOW (ref 150–400)
RBC # BLD: 3.2 M/UL — LOW (ref 3.8–5.2)
RBC # FLD: 19.9 % — HIGH (ref 10.3–14.5)
WBC # BLD: 4.4 K/UL — SIGNIFICANT CHANGE UP (ref 3.8–10.5)
WBC # FLD AUTO: 4.4 K/UL — SIGNIFICANT CHANGE UP (ref 3.8–10.5)

## 2019-07-10 ENCOUNTER — APPOINTMENT (OUTPATIENT)
Age: 54
End: 2019-07-10
Payer: MEDICAID

## 2019-07-10 VITALS
RESPIRATION RATE: 17 BRPM | HEART RATE: 90 BPM | OXYGEN SATURATION: 100 % | WEIGHT: 104.72 LBS | DIASTOLIC BLOOD PRESSURE: 66 MMHG | BODY MASS INDEX: 21.15 KG/M2 | TEMPERATURE: 99.4 F | SYSTOLIC BLOOD PRESSURE: 104 MMHG

## 2019-07-10 PROCEDURE — 99214 OFFICE O/P EST MOD 30 MIN: CPT

## 2019-07-11 DIAGNOSIS — R11.2 NAUSEA WITH VOMITING, UNSPECIFIED: ICD-10-CM

## 2019-07-11 DIAGNOSIS — Z51.11 ENCOUNTER FOR ANTINEOPLASTIC CHEMOTHERAPY: ICD-10-CM

## 2019-07-11 NOTE — HISTORY OF PRESENT ILLNESS
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 \par \par Initially was admitted to [facility] on 12/18/18 with dizziness, found to have elevated LFTs. MRCP demonstrated a dilatation of the CBD. ERCP on 12/22/18 performed c/w ampullary mass with obstruction. Pathology c/w adenocarcinoma. Pt was discharged for out pt follow up with surg onc and med onc but was re-admitted on 1/5/19 with n/v, worsening abdominal pain, found to have severe sepsis with E coli bacteremia 2/2 acute cholangitis. \par 1/7: ERCP performed. Stent was removed from the biliary tree. One plastic stent placed\par 1/5 CT A/P Heterogeneity of the right hepatic lobe with questionable 1.3 cm hypoattenuating lesion in segment 7.\par 1/8 MRI Abd: No suspicious liver lesions. No abnormality is identified to correspond with questionable lesion identified on prior CT. 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  Taken to the OR for evacuation of hematoma, cessation of gastroduodenal artery bleeding with two hemostatic stitches, and placement of abthera VAC.  Required pressors in the SICU, then developed fever. \par 1/21-1/22: OR exploratory laparotomy, drainage of abdominal abscess, repair of abdominal wall hernia with Strattice mesh, b/ abdominal wall advancement flaps. Post-op patient remained intubated and off vasopressors. FENA calculated to be pre-renal. 500CC albumin bolus given, urine output improved. 1/22/19 attempted spontaneous breathing trials but pt hypoxic. Pt lactate uptrending, resuscitated w/IVF, and pt hypotensive requiring pressors. UCx growing candida, Diflucan changed to micafungin to r/o resistant organisms in the setting of worsening sepsis. \par 1/23/19, patient noted to have blood oozing from midline incision while hypotensive and on pressors. Peak pressures noted to be elevated on vent and abdomen increasingly distended. All staples removed and a large amount of clot evacuated from midline incision. 1U PRBC given. No active bleeding appreciated. Fascia remains closed. Wound packed with wet to dry dressing. Vitamin K given for elevated INR. \par 1/24-1/29: remained in SICU, started on TPN, diuresed, finally extubated. \par 2/1: septic shock again, pressors restarted\par 2/2-2/11: CT scan with peritonitis, new abscesses. Ab and Antifungals started. Wound with increase in drainage. Octreotide started for increased output \par 2/12: drainage of abscesses by IR.  \par 2/18: Wound was partially closed by plastics with interrupted sutures and an ostomy appliance was placed, VAC removed.\par 3/1/19. d/c home \par \par 4/30/19: C1D1 Gemzar/Xeloda \par 5/7/19: C1D8 Gemzar/Xeloda \par 5/21/19: C2D1\par 5/28/19: C2D8\par 6/11/19: C3D1\par 6/18/19: C3D8\par 7/2/19: C4D1\par 7/9/19: C4D8 [de-identified] : adenocarcinoma [de-identified] : CA 19-9 34 [de-identified] : + severe hand foot pain. + burning, tightness of b/l hands. Has difficulty buttoning her shirt, doing fine motor activities. + painful soles of feet, + dry skin, pain with walking. Has been using udderly smooth TID. \par + persistent abdominal discomfort when she walks or moves, twists. Feels like "things are moving inside her". Symptoms especially bothersome when walking. Wearing compression belt does not help.\par + fatigue. Spends most of the two weeks minimally active, although made dinner yesterday \par + poor appetite. Lost another 1lb. No n/v. \par Shoulder pain has improved after 2nd injection.  [de-identified] : FINAL PATHOLOGY:\par -Invasive adenocarcinoma of the duodenal ampulla (Tumor size: 2.5 x 1.5 x 0.8 cm); Tumor stage: pT3b pN1\par -Vascular and perineural invasion identified\par -1/24 lymph nodes involved by adenocarcinoma\par -Negative margins\par -S/p cholecystectomy ; acute cholecystitis with cholelithiasis \par -Bile duct margin with acute inflammation and focal atypia consistent with low grade dysplasia. \par

## 2019-07-11 NOTE — REVIEW OF SYSTEMS
[Fatigue] : fatigue [Recent Change In Weight] : ~T recent weight change [Abdominal Pain] : abdominal pain [Joint Pain] : joint pain [Joint Stiffness] : joint stiffness [Negative] : Allergic/Immunologic

## 2019-07-11 NOTE — PHYSICAL EXAM
[Restricted in physically strenuous activity but ambulatory and able to carry out work of a light or sedentary nature] : Status 1- Restricted in physically strenuous activity but ambulatory and able to carry out work of a light or sedentary nature, e.g., light house work, office work [Normal] : affect appropriate [de-identified] : hyperpigmented palms and soles, hands are swollen, tight skin, dry skin on soles of feet

## 2019-07-11 NOTE — CONSULT LETTER
[Dear  ___] : Dear  [unfilled], [Please see my note below.] : Please see my note below. [Courtesy Letter:] : I had the pleasure of seeing your patient, [unfilled], in my office today. [Consult Closing:] : Thank you very much for allowing me to participate in the care of this patient.  If you have any questions, please do not hesitate to contact me. [Sincerely,] : Sincerely, [FreeTextEntry3] : Katty Navarro D.O. \par Attending Physician \par [facility] On[facility] H[facility]\par  \par [facility] \par Tel: [phone]\par Fax: [phone]\par

## 2019-07-16 ENCOUNTER — APPOINTMENT (OUTPATIENT)
Dept: OPHTHALMOLOGY | Facility: CLINIC | Age: 54
End: 2019-07-16

## 2019-07-17 ENCOUNTER — OUTPATIENT (OUTPATIENT)
Dept: OUTPATIENT SERVICES | Facility: HOSPITAL | Age: 54
LOS: 1 days | End: 2019-07-17

## 2019-07-17 ENCOUNTER — APPOINTMENT (OUTPATIENT)
Dept: ORTHOPEDIC SURGERY | Facility: HOSPITAL | Age: 54
End: 2019-07-17

## 2019-07-17 VITALS
HEART RATE: 76 BPM | WEIGHT: 105 LBS | HEIGHT: 59 IN | BODY MASS INDEX: 21.17 KG/M2 | DIASTOLIC BLOOD PRESSURE: 65 MMHG | SYSTOLIC BLOOD PRESSURE: 106 MMHG

## 2019-07-17 NOTE — END OF VISIT
[] : Resident [FreeTextEntry3] : Agree with the resident note.  Discussed with the daughter.  Though ampullary carcinoma rarely metastasizes to bone, she is generalized pain about the left rib cage and t spine.  We are asking heme/onc to decide if a bone scan is appropriate and if so o order it.  Discussed with the dtr who interpreted.

## 2019-07-17 NOTE — DATA REVIEWED
[FreeTextEntry1] : No new imaging today. Prior exam shows:\par \par EXAM: MR SHOULDER WAW IC LT \par PROCEDURE DATE: 03/31/2019 \par INTERPRETATION: \par LEFT SHOULDER MRI \par \par IMPRESSION: \par \par Thickening and increased signal in the inferior glenohumeral ligament with \par associated contrast enhancement. Findings may be related to adhesive \par capsulitis. Correlate clinically. \par \par Mild tendinosis and fraying along the articular side of the rotator cuff \par near the junction of posterior supraspinatus and anterior infraspinatus \par tendons. No full-thickness rotator cuff tear. \par \par No abnormal masslike enhancement in the visualized osseous structures. \par \par Nonspecific axillary lymph nodes measuring less than 1 cm short axis, \par evaluation limited. \par \par Arthritic changes of the AC joint.

## 2019-07-17 NOTE — PHYSICAL EXAM
[General Appearance - Alert] : alert [General Appearance - In No Acute Distress] : in no acute distress [General Appearance - Well Developed] : well developed [FreeTextEntry1] : NAD\par LUE skin intact, palmar skin discolored bilaterally due to chemo rxn\par L shoulder TTP at AC joint\par L shoulder  limited due to pain\par Abduction to 90, mild pain\par No biceps pain\par TTP over lumbar spine and L rib cage all throughout\par TTP/spasms about trapezius, rhomboids, and deltoid muscles on the L side\par DTRs: biceps/triceps/brachioradialis 2+ bilaterally

## 2019-07-17 NOTE — HISTORY OF PRESENT ILLNESS
[FreeTextEntry1] : 54F with T2DM, HTN, asthma, ampullary adenocarcinoma s/p Whipple c/b infection, RTOR and prolonged hospital stay presents to ortho clinic for follow-up of her L shoulder pain. Patient has received 2 injections in the past, which she states has helped, her last injection being at the end of May. She has completed 4 sessions of PT which she also says has helped very much and is here today to get authorization for more PT. She denies numbness, weakness, paresthesias in the the LUE.  She notes spasm of paraspinal muscles and around shoulder girdle, and has pain in her left side. She denies fevers/chills.

## 2019-07-17 NOTE — REASON FOR VISIT
[Follow-Up: _____] : a [unfilled] follow-up visit [Follow-Up] : a follow-up visit [Family Member] : family member [FreeTextEntry1] : L shoulder pain

## 2019-07-17 NOTE — DISCUSSION/SUMMARY
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  Pain and symptoms related to AC joint arthrosis and bursal inflammation. Pain about the lumbar spine and L sided rib cage is suspicious so a bone scan may be warranted; however, we want to speak with the hematology/oncology team following the patient to see if they agree.  Referral given to patient for more PT. Follow up in 2 months. All questions and concerns addressed. The patient and daughter are in agreement with treatment plan.\par \par [place] PGY-1\par Discussed with \par \par

## 2019-07-23 ENCOUNTER — APPOINTMENT (OUTPATIENT)
Age: 54
End: 2019-07-23

## 2019-07-23 ENCOUNTER — RESULT REVIEW (OUTPATIENT)
Age: 54
End: 2019-07-23

## 2019-07-23 ENCOUNTER — APPOINTMENT (OUTPATIENT)
Dept: HEMATOLOGY ONCOLOGY | Facility: CLINIC | Age: 54
End: 2019-07-23
Payer: MEDICAID

## 2019-07-23 VITALS
OXYGEN SATURATION: 100 % | SYSTOLIC BLOOD PRESSURE: 99 MMHG | TEMPERATURE: 97.7 F | BODY MASS INDEX: 21.6 KG/M2 | WEIGHT: 106.92 LBS | DIASTOLIC BLOOD PRESSURE: 64 MMHG | HEART RATE: 71 BPM | RESPIRATION RATE: 16 BRPM

## 2019-07-23 LAB
BASOPHILS # BLD AUTO: 0 K/UL — SIGNIFICANT CHANGE UP (ref 0–0.2)
BASOPHILS NFR BLD AUTO: 0.6 % — SIGNIFICANT CHANGE UP (ref 0–2)
EOSINOPHIL # BLD AUTO: 0.1 K/UL — SIGNIFICANT CHANGE UP (ref 0–0.5)
EOSINOPHIL NFR BLD AUTO: 1.7 % — SIGNIFICANT CHANGE UP (ref 0–6)
HCT VFR BLD CALC: 30.1 % — LOW (ref 34.5–45)
HGB BLD-MCNC: 10.1 G/DL — LOW (ref 11.5–15.5)
LYMPHOCYTES # BLD AUTO: 2.1 K/UL — SIGNIFICANT CHANGE UP (ref 1–3.3)
LYMPHOCYTES # BLD AUTO: 30 % — SIGNIFICANT CHANGE UP (ref 13–44)
MCHC RBC-ENTMCNC: 30.3 PG — SIGNIFICANT CHANGE UP (ref 27–34)
MCHC RBC-ENTMCNC: 33.6 G/DL — SIGNIFICANT CHANGE UP (ref 32–36)
MCV RBC AUTO: 90.2 FL — SIGNIFICANT CHANGE UP (ref 80–100)
MONOCYTES # BLD AUTO: 1.1 K/UL — HIGH (ref 0–0.9)
MONOCYTES NFR BLD AUTO: 15 % — HIGH (ref 2–14)
NEUTROPHILS # BLD AUTO: 3.8 K/UL — SIGNIFICANT CHANGE UP (ref 1.8–7.4)
NEUTROPHILS NFR BLD AUTO: 52.7 % — SIGNIFICANT CHANGE UP (ref 43–77)
PLATELET # BLD AUTO: 127 K/UL — LOW (ref 150–400)
RBC # BLD: 3.34 M/UL — LOW (ref 3.8–5.2)
RBC # FLD: 19.3 % — HIGH (ref 10.3–14.5)
WBC # BLD: 7.1 K/UL — SIGNIFICANT CHANGE UP (ref 3.8–10.5)
WBC # FLD AUTO: 7.1 K/UL — SIGNIFICANT CHANGE UP (ref 3.8–10.5)

## 2019-07-23 PROCEDURE — 99214 OFFICE O/P EST MOD 30 MIN: CPT

## 2019-07-23 NOTE — REASON FOR VISIT
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

## 2019-07-23 NOTE — CONSULT LETTER
[Courtesy Letter:] : I had the pleasure of seeing your patient, [unfilled], in my office today. [Dear  ___] : Dear  [unfilled], [Please see my note below.] : Please see my note below. [Sincerely,] : Sincerely, [Consult Closing:] : Thank you very much for allowing me to participate in the care of this patient.  If you have any questions, please do not hesitate to contact me. [FreeTextEntry3] : Katty Navarro D.O. \par Attending Physician \par [facility] On[facility] H[facility]\par  \par [facility] \par Tel: [phone]\par Fax: [phone]\par

## 2019-07-23 NOTE — HISTORY OF PRESENT ILLNESS
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 \par \par Initially was admitted to [facility] on 12/18/18 with dizziness, found to have elevated LFTs. MRCP demonstrated a dilatation of the CBD. ERCP on 12/22/18 performed c/w ampullary mass with obstruction. Pathology c/w adenocarcinoma. Pt was discharged for out pt follow up with surg onc and med onc but was re-admitted on 1/5/19 with n/v, worsening abdominal pain, found to have severe sepsis with E coli bacteremia 2/2 acute cholangitis. \par 1/7: ERCP performed. Stent was removed from the biliary tree. One plastic stent placed\par 1/5 CT A/P Heterogeneity of the right hepatic lobe with questionable 1.3 cm hypoattenuating lesion in segment 7.\par 1/8 MRI Abd: No suspicious liver lesions. No abnormality is identified to correspond with questionable lesion identified on prior CT. 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  Taken to the OR for evacuation of hematoma, cessation of gastroduodenal artery bleeding with two hemostatic stitches, and placement of abthera VAC.  Required pressors in the SICU, then developed fever. \par 1/21-1/22: OR exploratory laparotomy, drainage of abdominal abscess, repair of abdominal wall hernia with Strattice mesh, b/ abdominal wall advancement flaps. Post-op patient remained intubated and off vasopressors. FENA calculated to be pre-renal. 500CC albumin bolus given, urine output improved. 1/22/19 attempted spontaneous breathing trials but pt hypoxic. Pt lactate uptrending, resuscitated w/IVF, and pt hypotensive requiring pressors. UCx growing candida, Diflucan changed to micafungin to r/o resistant organisms in the setting of worsening sepsis. \par 1/23/19, patient noted to have blood oozing from midline incision while hypotensive and on pressors. Peak pressures noted to be elevated on vent and abdomen increasingly distended. All staples removed and a large amount of clot evacuated from midline incision. 1U PRBC given. No active bleeding appreciated. Fascia remains closed. Wound packed with wet to dry dressing. Vitamin K given for elevated INR. \par 1/24-1/29: remained in SICU, started on TPN, diuresed, finally extubated. \par 2/1: septic shock again, pressors restarted\par 2/2-2/11: CT scan with peritonitis, new abscesses. Ab and Antifungals started. Wound with increase in drainage. Octreotide started for increased output \par 2/12: drainage of abscesses by IR.  \par 2/18: Wound was partially closed by plastics with interrupted sutures and an ostomy appliance was placed, VAC removed.\par 3/1/19. d/c home \par \par 4/30/19: C1D1 Gemzar/Xeloda \par 5/7/19: C1D8 Gemzar/Xeloda \par 5/21/19: C2D1\par 5/28/19: C2D8\par 6/11/19: C3D1\par 6/18/19: C3D8\par 7/2/19: C4D1\par 7/9/19: C4D8\par 7/10/19: Xeloda held 2/2 grade 2-3 hand/foot syndrome  [de-identified] : adenocarcinoma [de-identified] : CA [id number] [de-identified] : FINAL PATHOLOGY:\par -Invasive adenocarcinoma of the duodenal ampulla (Tumor size: 2.5 x 1.5 x 0.8 cm); Tumor stage: pT3b pN1\par -Vascular and perineural invasion identified\par -1/24 lymph nodes involved by adenocarcinoma\par -Negative margins\par -S/p cholecystectomy ; acute cholecystitis with cholelithiasis \par -Bile duct margin with acute inflammation and focal atypia consistent with low grade dysplasia. \par  [de-identified] : Cont to c/o pain of hands and feet a/w desquamation and swelling of hands. Has been off Xeloda for 2 weeks, thinks only improved 20%. \par + constipation despite Colace/senna. Does not want to take miralax. Is not hydrating enough, drinks just sips of water during the day. \par c/o new back pain, R scapula pain, worse on palpation, minimally bothersome unless someone is examining her. Thinks started 10 days ago. Symptoms are mild but bothersome.\par Weight is stable, gained 2 lbs since last visit.

## 2019-07-23 NOTE — REVIEW OF SYSTEMS
[Fatigue] : fatigue [Joint Stiffness] : joint stiffness [Abdominal Pain] : abdominal pain [Joint Pain] : joint pain [Skin Rash] : skin rash [Negative] : Allergic/Immunologic

## 2019-07-23 NOTE — PHYSICAL EXAM
[Restricted in physically strenuous activity but ambulatory and able to carry out work of a light or sedentary nature] : Status 1- Restricted in physically strenuous activity but ambulatory and able to carry out work of a light or sedentary nature, e.g., light house work, office work [Normal] : affect appropriate [de-identified] : TTP mid spine around T9-10, tenderness over R scapula [de-identified] : stable L shoulder tenderness  [de-identified] : desquamation of hands and soles of feet, erythema involving palms, skin tight/swollen

## 2019-07-30 ENCOUNTER — RESULT REVIEW (OUTPATIENT)
Age: 54
End: 2019-07-30

## 2019-07-30 ENCOUNTER — APPOINTMENT (OUTPATIENT)
Dept: SURGICAL ONCOLOGY | Facility: CLINIC | Age: 54
End: 2019-07-30
Payer: MEDICAID

## 2019-07-30 ENCOUNTER — APPOINTMENT (OUTPATIENT)
Age: 54
End: 2019-07-30

## 2019-07-30 ENCOUNTER — LABORATORY RESULT (OUTPATIENT)
Age: 54
End: 2019-07-30

## 2019-07-30 VITALS
HEIGHT: 59 IN | BODY MASS INDEX: 21.37 KG/M2 | HEART RATE: 70 BPM | SYSTOLIC BLOOD PRESSURE: 101 MMHG | RESPIRATION RATE: 17 BRPM | DIASTOLIC BLOOD PRESSURE: 65 MMHG | OXYGEN SATURATION: 95 % | WEIGHT: 106 LBS | TEMPERATURE: 98.1 F

## 2019-07-30 LAB
BASOPHILS # BLD AUTO: 0.1 K/UL — SIGNIFICANT CHANGE UP (ref 0–0.2)
BASOPHILS NFR BLD AUTO: 1.3 % — SIGNIFICANT CHANGE UP (ref 0–2)
EOSINOPHIL # BLD AUTO: 0.1 K/UL — SIGNIFICANT CHANGE UP (ref 0–0.5)
EOSINOPHIL NFR BLD AUTO: 3.1 % — SIGNIFICANT CHANGE UP (ref 0–6)
HCT VFR BLD CALC: 30.7 % — LOW (ref 34.5–45)
HGB BLD-MCNC: 10.3 G/DL — LOW (ref 11.5–15.5)
LYMPHOCYTES # BLD AUTO: 2.7 K/UL — SIGNIFICANT CHANGE UP (ref 1–3.3)
LYMPHOCYTES # BLD AUTO: 55.9 % — HIGH (ref 13–44)
MCHC RBC-ENTMCNC: 30.5 PG — SIGNIFICANT CHANGE UP (ref 27–34)
MCHC RBC-ENTMCNC: 33.6 G/DL — SIGNIFICANT CHANGE UP (ref 32–36)
MCV RBC AUTO: 90.7 FL — SIGNIFICANT CHANGE UP (ref 80–100)
MONOCYTES # BLD AUTO: 0.7 K/UL — SIGNIFICANT CHANGE UP (ref 0–0.9)
MONOCYTES NFR BLD AUTO: 15.4 % — HIGH (ref 2–14)
NEUTROPHILS # BLD AUTO: 1.2 K/UL — LOW (ref 1.8–7.4)
NEUTROPHILS NFR BLD AUTO: 24.3 % — LOW (ref 43–77)
PLATELET # BLD AUTO: 126 K/UL — LOW (ref 150–400)
RBC # BLD: 3.39 M/UL — LOW (ref 3.8–5.2)
RBC # FLD: 18.1 % — HIGH (ref 10.3–14.5)
WBC # BLD: 4.8 K/UL — SIGNIFICANT CHANGE UP (ref 3.8–10.5)
WBC # FLD AUTO: 4.8 K/UL — SIGNIFICANT CHANGE UP (ref 3.8–10.5)

## 2019-07-30 PROCEDURE — 99214 OFFICE O/P EST MOD 30 MIN: CPT

## 2019-08-01 NOTE — PHYSICAL EXAM
[Normal] : supple, no neck mass and thyroid not enlarged [Normal] : grossly intact [de-identified] : soft NT, ND; well healed incision  ; wearing an abdominal binder

## 2019-08-01 NOTE — ASSESSMENT
[FreeTextEntry1] : 54 year old woman s/p Whipple procedure for T3N1 (1/24 lymph nodes positive) ampullary adenocarcinoma, complicated by GDA bleed RTOR for oversewing of vessel, abdominal wall bleed requiring opening of the incision and secondary closure with Dermaclose, developed low output ECF.  Well healed incisions.\par \par 7/30/19 - She is currently on Xeloda/Gemzar under the care of Dr. Katty Navarro.  Xeloda recently on hold due to Grade 2-3 hand foot syndrome.  She continues to use Betamethasone cream for the hand/foot syndrome.   CT Abdomen performed on 5/22/19 showing stable findings. \par \par Plan:\par 1) Chemo as per Dr. Katty Navarro \par 2) Increase PO intake and activity\par 3) RTO in 6 months

## 2019-08-01 NOTE — HISTORY OF PRESENT ILLNESS
[de-identified] : 54 year old female presents for a 3 month follow up visit.   \par \par - HOSPITAL COURSE- Hospitalized at [facility] from 1/5/19-3/5/19\par 53 y.o. 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 Patient had acute onset of RUQ pain yesterday, with no inciting factors or relief with aleve. The patient's family also endorsed 1-2 episodes of NBNB emesis yesterday and has had decreased PO intake over the last 36 hours. As of diagnosis of cancer, the patient has not had follow up with oncology or surg-oncology due insurance issues. The patient has an appointment with [facility] on Wednesday for surgical evaluation. In the ED, patient febrile to 101.5, HR 98, BPs decreased to 98/41, found with leukocytosis to 15.2, lactate 4.4. She was found with RUQ pain, fever, and jaundice (Bilirubin 3.9) consistent with acute cholangitis. CT A/P revealed Hyperattenuation and mild wall thickening involving the CBD raises the possibility of cholangitis with pneumobilia related to recent ERCP. In the ED, patient received IVF, vanc/zosyn, morphine, and zofran. Patient admitted with severe sepsis 2/2 acute cholangitis. MICU was consulted for hypotension, but this improved s/p fluids and IV antibiotics and patient did not warrant MICU level care. GI was consulted and recommended ERCP. The patient was maintained on zosyn for IV antibiotics. Patient remained stable on zosyn, white count trended down, lactate improved, and fevers were resolving. BCs ultimately grew E.Coli. 1/7: ERCP performed. Stent was removed from the biliary tree. One plastic stent was placed into the common bile duct. The major papilla appeared to have a mass.Oncology was consulted given patient's poor outpatient oncology follow up due to insurance issues, they recommended CT scan and  MRI abdomen/pelvis with contrast for continued staging of cancer workup. \par \par 1/8/19 CT A/P - showed Small bilateral pleural effusions with bibasilar atelectasis. No lung nodules. Mildly enlarged right cardiophrenic angle lymph node is unchanged since December 18, 2018 is indeterminate\par \par 1/8/19  MRI-  IMPRESSION: *  No suspicious liver lesions. No abnormality is identified to correspond with questionable lesion identified on prior CT. * Intra and extrahepatic biliary ductal dilation, with mural enhancement which may be seen in the setting of cholangitis. *  Cholelithiasis with gallbladder wall thickening.Pt  transferred to surgical oncology service and went to the OR on 1/11/19. \par \par  1/11/19- S/p ex-lap, Whipple pancreaticoduodenectomy for duodenal adenocarcinoma. \par \par FINAL PATHOLOGY:\par -Invasive adenocarcinoma of the duodenal ampulla (Tumor size: 2.5 x 1.5 x 0.8 cm); Tumor stage: pT3b pN1\par -Vascular and perineural invasion identified\par -1/24 lymph nodes involved by adenocarcinoma\par -Negative margins\par -S/p cholecystectomy ; acute cholecystitis with cholelithiasis \par -Bile duct margin with acute inflammation and focal atypia consistent with low grade dysplasia. \par \par Intraop findings: pylorus sparing Whipple.  Pt tolerated procedure well, without complication.\par \par On 1/18/19, patient was a surgical rapid response for which pt transferred to SICU. Patient was febrile to 102. Cultures sent. Antibiotics changed from zosyn to meropenem. Patient was tachypneic and hypotensive to 70s systolic. CBC revealed drop in H/H to 6/19 with elevated lactate of 9. Decision was made to intubate patient. Massive Transfusion protocol initiated. Patient received 4PRBC 5FFP and 1platelet, and bolus 1L of fluid. Surgical team and attending notified when Peak pressures on vent were 50s and increased abdominal distention. Decision was then made to take patient emergently to OR. Patient had evacuation of hematoma, cessation of gastroduodenal artery bleeding with two hemostatic stitches, and placement of abthera VAC. Pt transferred to SICU post op. Patient hypotensive post-op with low UOP. 2 L bolus given.  CBC remained stable. Patient hypotensive to 80s systolic, [place] started and increased, patient was then transitioned to levo. Lactate downtrended to 7.6 and H/H stable. \par \par On 1/19 pt cont to require pressors, resuscitated with IVF, and had drop H/H for which transfused. On 1/21/19, Pt febrile to 100.6 overnight, cultures sent, UA+ nitrite, follow up urine cultures. \par \par Pt RTOR on 1/21/19 for Exploratory laparotomy, drainage of abdominal abscess, repair of abdominal wall hernia with Strattice mesh, b/ abdominal wall advancement flaps. Post-op patient remained intubated and  off vasopressors. FENA calculated to be pre-renal. 500CC albumin bolus given, urine output improved. 1/22/19 attempted spontaneous breathing trials but pt hypoxic. Pt lactate uptrending, resuscitated w/IVF, and pt hypotensive requiring pressors. UCx growing candida, Diflucan changed to micafungin to r/o resistant organisms in the setting of worsening sepsis. On 1/23/19, patient noted to have blood oozing from \par midline incision while hypotensive and on pressors.  Peak pressures noted to be elevated on vent and abdomen increasingly distended.  All staples removed and a large amount of clot evacuated from midline incision. 1U PRBC given. No active bleeding appreciated.  Fascia remains closed.  Wound packed with wet to dry dressing.  Vitamin K given for elevated INR.  1/24/19 nutrition consulted and pt. started on TPN. Plastic consulted bc midline opened for hematoma evacuation yesterday, external tissue expander device (Dermaclose) applied to the wound. Patient hypotensive requiring reinitiation of low dose vasopressors. On 1/25/19, sedatives and pressors were decreased. Pt was volume overloaded and was given Albumin, Lasix, and a Bumex drip. O/N, pt febrile 102, given tylenol. mildly hypotensive SBP 80, started on small dose levo. 1/27 Cont SBT. Decision to continue diuresis with Diamox 1/28 Patient febrile to 101 today. Diuresing with lasix/bumex, giving albumin. Continued SBTs.  On 1/29/19, patient tolerating CPAP. Patient extubated without issue. NGT was removed. Patient started on clears, continue TPN while caloric intake is still low. 1/30/19 CT scan performed and showed b/l moderate pleural effusions with associated atelectasis.  Partially \par imaged right lower lung patchy opacity may represent small infiltrate in the appropriate clinical setting. Moderate abdominopelvic ascites most prominent subjacent to the anterior  abdominal wall.  Left upper and lower quadrant collection measuring greater than fluid attenuation, which may reflect the presence of hemorrhagic products. Mild enhancement is seen in association with \par the anterior abdominal ascites, dependent pelvic ascites, and left mid abdominal collection, consistent with the presence of peritonitis. Findings may be postoperative or related to the presence of hemorrhagic products.Pt started on po labetalol for persistent HTN, however pt with hypotensive episode in PM shortly after administration. Patient given albumin. 2/1/19, persistently febrile, Tmax 101.3, hypotensive, tachycardic, and tachypneac. Albumin and IVF given, BP did not respond. Levophed restarted. JOURDAN X 2 started to drain copious amounts of bilious outpt. JOURDAN #1 700cc and JOURDAN #1 500cc. Given stat dose of vancomycin and \par meropenum.  2/2/19 CT scan performed and showed New diffuse peritoneal enhancement, compatible with peritonitis, more extensive compared to 1/30/2019. Multiple abdominal and pelvic fluid collections consistent with abscesses. New small bowel wall may also edema and colonic wall thickening, likely reactive. Left upper quadrant collection measuring greater than fluid attenuation is grossly unchanged from 1/30/2019 .On 2/3/19 she received 2 u prbc overnight, placed on BIPAP overnight,  vanc \par started overnight, start diflucan, vaso and levo started overnight, ABG and CBC, PM labs, A line placed overnight. Plan for IR drainage tomorrow. On 2/4/19: on pressors overnight, removed in AM. IR today for abscess drainage. advance tiger tube, NGT removed. repeat labs, remove a line after procedure. Increased serobilious wound drainage, BID dressing changes.2/5/19: diflucan to micafungin. decreased seroquil to 25. IV lock. start trickle feeds glucerna. 2/6: no further diuresis at this time, high bilious midline wound output, vac to be placed today by plastics. Sinha out. feeds to goal today.2/7: Central line and sinha removed, discontinued Seroquel, feeds changed to continuous 2/8: Octreotide started for high output from midline wound. Lasix with goal net negative 2L2/9: Resent blood cx and UA, bedside vac change by plastics today.2/11: Patient ordered for CLD w/ TF via NJT, upper and lower portion of abdominal wound closed by plastics, send left JOURDAN for amylase 2/12: CT A&P shows two collections... drainage by IR today. Drain was upsized on the right and left  collection 50cc of pus was drained.2/13: Stable for dispo to floor. 2/15: ID was consulted and recommended Meropenem and to continue Vancomycin for better source control. Patient was also seen by PM&R and they recommended FARIBA if patient is able to participate in care.2/18: Wound was partially closed by plastics with interrupted sutures and an ostomy appliance was placed, VAC removed.2/19 Antibiotics was changed to Meropenem and Zyvox, culture grew VRE.2/20: Patient had a tube check and the LUQ drain was removed. CT showed improvement in abdominal collections. Calorie count was performed. Patient is tolerating a regular diet with GLucerna shakes. Octreotide discontinued on 2/26.2/28: Tube check done and RLQ drain was removed.Patient will be discharged home. Patient will have VNS for wound care and PT.  She will be discharged on 1 more week of antibiotics (Cipro and Flagyl per ID).  Discharged home on 3/1/19. \par \par INTERVAL HISTORY:\par 3/12/19 -  Doing better.  Tolerating PO diet.  + GI fxn.  Pain well controlled.  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/day.\par \par Recent Labs 3/14/19:\par WBC:   9.4 K/uL\par HGB:  9.0 g/dL\par RBC:  3.07 M/uL\par HCT:  25.9%\par PLT:  298 K/uL\par \par Metabolic:\par Na:  141 mmol/L\par K:  3.8 mmol/L\par Cl:  101 mmol/L\par CO2:  27 mmol/L\par Anion Gap:  13 mmol/L\par Glucose:  139 mg/dL\par BUN:  7 mg/dL\par Creat:  0.38 mg/dL\par Total Protein:  7.3 g/dL\par Albumin:  3.4 g/dL\par Serum Ca:  9.3 mg/dL\par Total Bili:  0.8 mg/dL\par AST:  34 U/L\par ALT:  14 U/L\par ALK Phos:  382 U/L\par eGFR:  121 mL/min/1.73M2\par \par CA 19-9:  42 U/mL\par CEA:  2.0 ng/mL\par \par Hepatitis C:  Nonreactive\par Hepatitis B Core Ab:  Nonreactive\par Hepatitis B Surface Ag:  Nonreactive\par \par APTT:  31.8 sec\par PT:  13.2 sec\par INR:  1.16 ratio\par \par 3/19/19: Today, Ms. Lagunas reports occasional nausea, no vomiting, no pain.  Abdominal wall fistula drained last on Saturday nigt 8 cc.  Not drained since.  Daughter states color of drainage has changed from creamy to tan color.  Denies, fevers, chills.   During this visit, sutures removed from midline incision, as well as drain removed (ostomy appliance).  Wound well healing with granulation tissue in the periphery of wound bed with yellow slough noted in bottom of wound bed.  Wound packed with wet to dry dressing.  Instruction and demonstration provided to patient's daughter on changing wet to dry dressing, however, advised she may return to the ostomy appliance if she feels drainage is increasing.  She will return to office to see me in 2 weeks for follow up.  \par \par INTERVAL HISTORY:\par 4/2/19- Daughter continues to change midline wound with wet to dry gauze dressing daily.  The patient is with c/o 7-8 out of 10 abdominal pain with movement and activity, partially relieved with Oxycodone.  She denies fever or chills.  Denies nausea or vomiting. Having daily Bm's and passing flatus.  Appetite continues to improve. The daughter states she is eating an adequate amount of food.  She is scheduled to see Dr. Katty Navarro (Heme-onc) tomorrow. \par \par 4/21/19 - MRI Abdomen- No evidence of hepatic metastatic disease.\par \par 4/30/19 - Feeling well. Incisions well healed. Scheduled to start chemotherapy under the care of Dr. Katty Navarro.  Denies abdominal pain, nausea, vomiting or changes in bowel habits. \par \par 7/30/19 - She is currently on Xeloda/Gemzar under the care of Dr. Katty Navarro.  Xeloda recently on hold due to Grade 2-3 hand foot syndrome.  She continues to use Betamethasone cream for the hand/foot syndrome.   CT Abdomen performed on 5/22/19 showing stable findings. States her appetite continues to improve and her weight remains stable.  Tolerating PO intake without nausea or vomiting. Reports daily BM's and passing flatus.  Denies fever or chills.  Reports occasional lower left/right abdominal discomfort with movement however has relief with wearing an abdominal binder.  \par \par

## 2019-08-05 ENCOUNTER — OUTPATIENT (OUTPATIENT)
Dept: OUTPATIENT SERVICES | Facility: HOSPITAL | Age: 54
LOS: 1 days | Discharge: HOME | End: 2019-08-05

## 2019-08-05 DIAGNOSIS — C24.1 MALIGNANT NEOPLASM OF AMPULLA OF VATER: ICD-10-CM

## 2019-08-07 ENCOUNTER — APPOINTMENT (OUTPATIENT)
Age: 54
End: 2019-08-07

## 2019-08-07 ENCOUNTER — APPOINTMENT (OUTPATIENT)
Dept: HEMATOLOGY ONCOLOGY | Facility: CLINIC | Age: 54
End: 2019-08-07
Payer: MEDICAID

## 2019-08-07 VITALS
SYSTOLIC BLOOD PRESSURE: 110 MMHG | RESPIRATION RATE: 16 BRPM | WEIGHT: 105.82 LBS | HEART RATE: 73 BPM | DIASTOLIC BLOOD PRESSURE: 70 MMHG | BODY MASS INDEX: 21.37 KG/M2 | OXYGEN SATURATION: 96 % | TEMPERATURE: 98.2 F

## 2019-08-07 PROCEDURE — 99214 OFFICE O/P EST MOD 30 MIN: CPT

## 2019-08-08 DIAGNOSIS — E86.0 DEHYDRATION: ICD-10-CM

## 2019-08-08 DIAGNOSIS — R11.2 NAUSEA WITH VOMITING, UNSPECIFIED: ICD-10-CM

## 2019-08-13 ENCOUNTER — APPOINTMENT (OUTPATIENT)
Age: 54
End: 2019-08-13

## 2019-08-13 ENCOUNTER — RESULT REVIEW (OUTPATIENT)
Age: 54
End: 2019-08-13

## 2019-08-13 ENCOUNTER — LABORATORY RESULT (OUTPATIENT)
Age: 54
End: 2019-08-13

## 2019-08-13 LAB
BASOPHILS # BLD AUTO: 0 K/UL — SIGNIFICANT CHANGE UP (ref 0–0.2)
BASOPHILS NFR BLD AUTO: 0.6 % — SIGNIFICANT CHANGE UP (ref 0–2)
EOSINOPHIL # BLD AUTO: 0.1 K/UL — SIGNIFICANT CHANGE UP (ref 0–0.5)
EOSINOPHIL NFR BLD AUTO: 2.2 % — SIGNIFICANT CHANGE UP (ref 0–6)
HCT VFR BLD CALC: 33.4 % — LOW (ref 34.5–45)
HGB BLD-MCNC: 11.3 G/DL — LOW (ref 11.5–15.5)
LYMPHOCYTES # BLD AUTO: 2.1 K/UL — SIGNIFICANT CHANGE UP (ref 1–3.3)
LYMPHOCYTES # BLD AUTO: 33.7 % — SIGNIFICANT CHANGE UP (ref 13–44)
MCHC RBC-ENTMCNC: 30.6 PG — SIGNIFICANT CHANGE UP (ref 27–34)
MCHC RBC-ENTMCNC: 33.9 G/DL — SIGNIFICANT CHANGE UP (ref 32–36)
MCV RBC AUTO: 90.3 FL — SIGNIFICANT CHANGE UP (ref 80–100)
MONOCYTES # BLD AUTO: 0.8 K/UL — SIGNIFICANT CHANGE UP (ref 0–0.9)
MONOCYTES NFR BLD AUTO: 12.3 % — SIGNIFICANT CHANGE UP (ref 2–14)
NEUTROPHILS # BLD AUTO: 3.2 K/UL — SIGNIFICANT CHANGE UP (ref 1.8–7.4)
NEUTROPHILS NFR BLD AUTO: 51.2 % — SIGNIFICANT CHANGE UP (ref 43–77)
PLATELET # BLD AUTO: 190 K/UL — SIGNIFICANT CHANGE UP (ref 150–400)
RBC # BLD: 3.7 M/UL — LOW (ref 3.8–5.2)
RBC # FLD: 16.6 % — HIGH (ref 10.3–14.5)
WBC # BLD: 6.3 K/UL — SIGNIFICANT CHANGE UP (ref 3.8–10.5)
WBC # FLD AUTO: 6.3 K/UL — SIGNIFICANT CHANGE UP (ref 3.8–10.5)

## 2019-08-13 NOTE — HISTORY OF PRESENT ILLNESS
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 \par \par Initially was admitted to [facility] on 12/18/18 with dizziness, found to have elevated LFTs. MRCP demonstrated a dilatation of the CBD. ERCP on 12/22/18 performed c/w ampullary mass with obstruction. Pathology c/w adenocarcinoma. Pt was discharged for out pt follow up with surg onc and med onc but was re-admitted on 1/5/19 with n/v, worsening abdominal pain, found to have severe sepsis with E coli bacteremia 2/2 acute cholangitis. \par 1/7: ERCP performed. Stent was removed from the biliary tree. One plastic stent placed\par 1/5 CT A/P Heterogeneity of the right hepatic lobe with questionable 1.3 cm hypoattenuating lesion in segment 7.\par 1/8 MRI Abd: No suspicious liver lesions. No abnormality is identified to correspond with questionable lesion identified on prior CT. 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  Taken to the OR for evacuation of hematoma, cessation of gastroduodenal artery bleeding with two hemostatic stitches, and placement of abthera VAC.  Required pressors in the SICU, then developed fever. \par 1/21-1/22: OR exploratory laparotomy, drainage of abdominal abscess, repair of abdominal wall hernia with Strattice mesh, b/ abdominal wall advancement flaps. Post-op patient remained intubated and off vasopressors. FENA calculated to be pre-renal. 500CC albumin bolus given, urine output improved. 1/22/19 attempted spontaneous breathing trials but pt hypoxic. Pt lactate uptrending, resuscitated w/IVF, and pt hypotensive requiring pressors. UCx growing candida, Diflucan changed to micafungin to r/o resistant organisms in the setting of worsening sepsis. \par 1/23/19, patient noted to have blood oozing from midline incision while hypotensive and on pressors. Peak pressures noted to be elevated on vent and abdomen increasingly distended. All staples removed and a large amount of clot evacuated from midline incision. 1U PRBC given. No active bleeding appreciated. Fascia remains closed. Wound packed with wet to dry dressing. Vitamin K given for elevated INR. \par 1/24-1/29: remained in SICU, started on TPN, diuresed, finally extubated. \par 2/1: septic shock again, pressors restarted\par 2/2-2/11: CT scan with peritonitis, new abscesses. Ab and Antifungals started. Wound with increase in drainage. Octreotide started for increased output \par 2/12: drainage of abscesses by IR.  \par 2/18: Wound was partially closed by plastics with interrupted sutures and an ostomy appliance was placed, VAC removed.\par 3/1/19. d/c home \par \par 4/30/19: C1D1 Gemzar/Xeloda \par 5/7/19: C1D8 Gemzar/Xeloda \par 5/21/19: C2D1\par 5/28/19: C2D8\par 6/11/19: C3D1\par 6/18/19: C3D8\par 7/2/19: C4D1\par 7/9/19: C4D8 Xeloda held 2/2 grade 2-3 hand/foot syndrome \par 7/23/19: C5D1 Xeloda still held \par 7/30/19: C5D8 \par  [de-identified] : adenocarcinoma [de-identified] : CA 19-9 34 [de-identified] : FINAL PATHOLOGY:\par -Invasive adenocarcinoma of the duodenal ampulla (Tumor size: 2.5 x 1.5 x 0.8 cm); Tumor stage: pT3b pN1\par -Vascular and perineural invasion identified\par -1/24 lymph nodes involved by adenocarcinoma\par -Negative margins\par -S/p cholecystectomy ; acute cholecystitis with cholelithiasis \par -Bile duct margin with acute inflammation and focal atypia consistent with low grade dysplasia. \par  [de-identified] : Hands and feet are about 20% improved but still painful and feel tight. Still with difficulty walking. using both steroid cream and udderly smooth. \par No diarrhea. + vomiting on the way here due to bumpy car ride. Feels weak.

## 2019-08-13 NOTE — REVIEW OF SYSTEMS
[Fatigue] : fatigue [Skin Rash] : skin rash [Difficulty Walking] : difficulty walking [Negative] : Allergic/Immunologic

## 2019-08-13 NOTE — PHYSICAL EXAM
[Restricted in physically strenuous activity but ambulatory and able to carry out work of a light or sedentary nature] : Status 1- Restricted in physically strenuous activity but ambulatory and able to carry out work of a light or sedentary nature, e.g., light house work, office work [Normal] : affect appropriate [de-identified] : stable L shoulder tenderness  [de-identified] : desquamation of hands and soles of feet improved, erythema involving palms, skin tight/swollen

## 2019-08-13 NOTE — CONSULT LETTER
[Dear  ___] : Dear  [unfilled], [Courtesy Letter:] : I had the pleasure of seeing your patient, [unfilled], in my office today. [Please see my note below.] : Please see my note below. [Consult Closing:] : Thank you very much for allowing me to participate in the care of this patient.  If you have any questions, please do not hesitate to contact me. [Sincerely,] : Sincerely, [FreeTextEntry3] : Katty Navarro D.O. \par Attending Physician \par [facility]\par  \par [facility] \par Tel: [phone]\par Fax: [phone]\par

## 2019-08-13 NOTE — PHYSICAL EXAM
[Restricted in physically strenuous activity but ambulatory and able to carry out work of a light or sedentary nature] : Status 1- Restricted in physically strenuous activity but ambulatory and able to carry out work of a light or sedentary nature, e.g., light house work, office work [Normal] : affect appropriate [de-identified] : stable L shoulder tenderness  [de-identified] : desquamation of hands and soles of feet improved, erythema involving palms, skin tight/swollen

## 2019-08-13 NOTE — CONSULT LETTER
[Dear  ___] : Dear  [unfilled], [Courtesy Letter:] : I had the pleasure of seeing your patient, [unfilled], in my office today. [Please see my note below.] : Please see my note below. [Consult Closing:] : Thank you very much for allowing me to participate in the care of this patient.  If you have any questions, please do not hesitate to contact me. [Sincerely,] : Sincerely, [FreeTextEntry3] : Katty Navarro D.O. \par Attending Physician \par [facility]\par  \par [facility] \par Tel: [phone]\par Fax: [phone]\par

## 2019-08-13 NOTE — HISTORY OF PRESENT ILLNESS
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 \par \par Initially was admitted to [facility] on 12/18/18 with dizziness, found to have elevated LFTs. MRCP demonstrated a dilatation of the CBD. ERCP on 12/22/18 performed c/w ampullary mass with obstruction. Pathology c/w adenocarcinoma. Pt was discharged for out pt follow up with surg onc and med onc but was re-admitted on 1/5/19 with n/v, worsening abdominal pain, found to have severe sepsis with E coli bacteremia 2/2 acute cholangitis. \par 1/7: ERCP performed. Stent was removed from the biliary tree. One plastic stent placed\par 1/5 CT A/P Heterogeneity of the right hepatic lobe with questionable 1.3 cm hypoattenuating lesion in segment 7.\par 1/8 MRI Abd: No suspicious liver lesions. No abnormality is identified to correspond with questionable lesion identified on prior CT. 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  Taken to the OR for evacuation of hematoma, cessation of gastroduodenal artery bleeding with two hemostatic stitches, and placement of abthera VAC.  Required pressors in the SICU, then developed fever. \par 1/21-1/22: OR exploratory laparotomy, drainage of abdominal abscess, repair of abdominal wall hernia with Strattice mesh, b/ abdominal wall advancement flaps. Post-op patient remained intubated and off vasopressors. FENA calculated to be pre-renal. 500CC albumin bolus given, urine output improved. 1/22/19 attempted spontaneous breathing trials but pt hypoxic. Pt lactate uptrending, resuscitated w/IVF, and pt hypotensive requiring pressors. UCx growing candida, Diflucan changed to micafungin to r/o resistant organisms in the setting of worsening sepsis. \par 1/23/19, patient noted to have blood oozing from midline incision while hypotensive and on pressors. Peak pressures noted to be elevated on vent and abdomen increasingly distended. All staples removed and a large amount of clot evacuated from midline incision. 1U PRBC given. No active bleeding appreciated. Fascia remains closed. Wound packed with wet to dry dressing. Vitamin K given for elevated INR. \par 1/24-1/29: remained in SICU, started on TPN, diuresed, finally extubated. \par 2/1: septic shock again, pressors restarted\par 2/2-2/11: CT scan with peritonitis, new abscesses. Ab and Antifungals started. Wound with increase in drainage. Octreotide started for increased output \par 2/12: drainage of abscesses by IR.  \par 2/18: Wound was partially closed by plastics with interrupted sutures and an ostomy appliance was placed, VAC removed.\par 3/1/19. d/c home \par \par 4/30/19: C1D1 Gemzar/Xeloda \par 5/7/19: C1D8 Gemzar/Xeloda \par 5/21/19: C2D1\par 5/28/19: C2D8\par 6/11/19: C3D1\par 6/18/19: C3D8\par 7/2/19: C4D1\par 7/9/19: C4D8 Xeloda held 2/2 grade 2-3 hand/foot syndrome \par 7/23/19: C5D1 Xeloda still held \par 7/30/19: C5D8 \par  [de-identified] : adenocarcinoma [de-identified] : CA 19-9 34 [de-identified] : FINAL PATHOLOGY:\par -Invasive adenocarcinoma of the duodenal ampulla (Tumor size: 2.5 x 1.5 x 0.8 cm); Tumor stage: pT3b pN1\par -Vascular and perineural invasion identified\par -1/24 lymph nodes involved by adenocarcinoma\par -Negative margins\par -S/p cholecystectomy ; acute cholecystitis with cholelithiasis \par -Bile duct margin with acute inflammation and focal atypia consistent with low grade dysplasia. \par  [de-identified] : Hands and feet are about 20% improved but still painful and feel tight. Still with difficulty walking. using both steroid cream and udderly smooth. \par No diarrhea. + vomiting on the way here due to bumpy car ride. Feels weak.

## 2019-08-14 DIAGNOSIS — Z51.11 ENCOUNTER FOR ANTINEOPLASTIC CHEMOTHERAPY: ICD-10-CM

## 2019-08-20 ENCOUNTER — RESULT REVIEW (OUTPATIENT)
Age: 54
End: 2019-08-20

## 2019-08-20 ENCOUNTER — APPOINTMENT (OUTPATIENT)
Dept: HEMATOLOGY ONCOLOGY | Facility: CLINIC | Age: 54
End: 2019-08-20
Payer: MEDICAID

## 2019-08-20 ENCOUNTER — APPOINTMENT (OUTPATIENT)
Age: 54
End: 2019-08-20

## 2019-08-20 DIAGNOSIS — H57.9 UNSPECIFIED DISORDER OF EYE AND ADNEXA: ICD-10-CM

## 2019-08-20 LAB
BASOPHILS # BLD AUTO: 0 K/UL — SIGNIFICANT CHANGE UP (ref 0–0.2)
BASOPHILS NFR BLD AUTO: 0.8 % — SIGNIFICANT CHANGE UP (ref 0–2)
EOSINOPHIL # BLD AUTO: 0.1 K/UL — SIGNIFICANT CHANGE UP (ref 0–0.5)
EOSINOPHIL NFR BLD AUTO: 1.5 % — SIGNIFICANT CHANGE UP (ref 0–6)
HCT VFR BLD CALC: 30.6 % — LOW (ref 34.5–45)
HGB BLD-MCNC: 10.5 G/DL — LOW (ref 11.5–15.5)
LYMPHOCYTES # BLD AUTO: 2.2 K/UL — SIGNIFICANT CHANGE UP (ref 1–3.3)
LYMPHOCYTES # BLD AUTO: 57.5 % — HIGH (ref 13–44)
MCHC RBC-ENTMCNC: 30.6 PG — SIGNIFICANT CHANGE UP (ref 27–34)
MCHC RBC-ENTMCNC: 34.1 G/DL — SIGNIFICANT CHANGE UP (ref 32–36)
MCV RBC AUTO: 89.7 FL — SIGNIFICANT CHANGE UP (ref 80–100)
MONOCYTES # BLD AUTO: 0.4 K/UL — SIGNIFICANT CHANGE UP (ref 0–0.9)
MONOCYTES NFR BLD AUTO: 9.8 % — SIGNIFICANT CHANGE UP (ref 2–14)
NEUTROPHILS # BLD AUTO: 1.2 K/UL — LOW (ref 1.8–7.4)
NEUTROPHILS NFR BLD AUTO: 30.3 % — LOW (ref 43–77)
PLATELET # BLD AUTO: 159 K/UL — SIGNIFICANT CHANGE UP (ref 150–400)
RBC # BLD: 3.42 M/UL — LOW (ref 3.8–5.2)
RBC # FLD: 16 % — HIGH (ref 10.3–14.5)
WBC # BLD: 3.8 K/UL — SIGNIFICANT CHANGE UP (ref 3.8–10.5)
WBC # FLD AUTO: 3.8 K/UL — SIGNIFICANT CHANGE UP (ref 3.8–10.5)

## 2019-08-20 PROCEDURE — 99214 OFFICE O/P EST MOD 30 MIN: CPT

## 2019-08-20 NOTE — CONSULT LETTER
[Dear  ___] : Dear  [unfilled], [Courtesy Letter:] : I had the pleasure of seeing your patient, [unfilled], in my office today. [Please see my note below.] : Please see my note below. [Consult Closing:] : Thank you very much for allowing me to participate in the care of this patient.  If you have any questions, please do not hesitate to contact me. [Sincerely,] : Sincerely, [FreeTextEntry3] : Katty Navarro D.O. \par Attending Physician \par [facility]\par  \par [facility] \par Tel: [phone]\par Fax: [phone]\par

## 2019-08-20 NOTE — PHYSICAL EXAM
[Restricted in physically strenuous activity but ambulatory and able to carry out work of a light or sedentary nature] : Status 1- Restricted in physically strenuous activity but ambulatory and able to carry out work of a light or sedentary nature, e.g., light house work, office work [Normal] : affect appropriate [de-identified] : much less erythema, still with desquamation of feet, hands with less swelling

## 2019-08-20 NOTE — HISTORY OF PRESENT ILLNESS
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 \par \par Initially was admitted to [facility] on 12/18/18 with dizziness, found to have elevated LFTs. MRCP demonstrated a dilatation of the CBD. ERCP on 12/22/18 performed c/w ampullary mass with obstruction. Pathology c/w adenocarcinoma. Pt was discharged for out pt follow up with surg onc and med onc but was re-admitted on 1/5/19 with n/v, worsening abdominal pain, found to have severe sepsis with E coli bacteremia 2/2 acute cholangitis. \par 1/7: ERCP performed. Stent was removed from the biliary tree. One plastic stent placed\par 1/5 CT A/P Heterogeneity of the right hepatic lobe with questionable 1.3 cm hypoattenuating lesion in segment 7.\par 1/8 MRI Abd: No suspicious liver lesions. No abnormality is identified to correspond with questionable lesion identified on prior CT. 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  Taken to the OR for evacuation of hematoma, cessation of gastroduodenal artery bleeding with two hemostatic stitches, and placement of abthera VAC.  Required pressors in the SICU, then developed fever. \par 1/21-1/22: OR exploratory laparotomy, drainage of abdominal abscess, repair of abdominal wall hernia with Strattice mesh, b/ abdominal wall advancement flaps. Post-op patient remained intubated and off vasopressors. FENA calculated to be pre-renal. 500CC albumin bolus given, urine output improved. 1/22/19 attempted spontaneous breathing trials but pt hypoxic. Pt lactate uptrending, resuscitated w/IVF, and pt hypotensive requiring pressors. UCx growing candida, Diflucan changed to micafungin to r/o resistant organisms in the setting of worsening sepsis. \par 1/23/19, patient noted to have blood oozing from midline incision while hypotensive and on pressors. Peak pressures noted to be elevated on vent and abdomen increasingly distended. All staples removed and a large amount of clot evacuated from midline incision. 1U PRBC given. No active bleeding appreciated. Fascia remains closed. Wound packed with wet to dry dressing. Vitamin K given for elevated INR. \par 1/24-1/29: remained in SICU, started on TPN, diuresed, finally extubated. \par 2/1: septic shock again, pressors restarted\par 2/2-2/11: CT scan with peritonitis, new abscesses. Ab and Antifungals started. Wound with increase in drainage. Octreotide started for increased output \par 2/12: drainage of abscesses by IR.  \par 2/18: Wound was partially closed by plastics with interrupted sutures and an ostomy appliance was placed, VAC removed.\par 3/1/19. d/c home \par \par 4/30/19: C1D1 Gemzar/Xeloda \par 5/7/19: C1D8 Gemzar/Xeloda \par 5/21/19: C2D1\par 5/28/19: C2D8\par 6/11/19: C3D1\par 6/18/19: C3D8\par 7/2/19: C4D1\par 7/9/19: C4D8 Xeloda held 2/2 grade 2-3 hand/foot syndrome \par 7/23/19: C5D1 Xeloda still held \par 7/30/19: C5D8 \par 8/13/19: C6D1\par 8/20/19: C6D8 Xeloda restarted 1000 mg BID\par  [de-identified] : adenocarcinoma [de-identified] : CA 19-9 34 [de-identified] : FINAL PATHOLOGY:\par -Invasive adenocarcinoma of the duodenal ampulla (Tumor size: 2.5 x 1.5 x 0.8 cm); Tumor stage: pT3b pN1\par -Vascular and perineural invasion identified\par -1/24 lymph nodes involved by adenocarcinoma\par -Negative margins\par -S/p cholecystectomy ; acute cholecystitis with cholelithiasis \par -Bile duct margin with acute inflammation and focal atypia consistent with low grade dysplasia. \par  [FreeTextEntry1] : Xeloda restarted today at dose reduced 1000 mg BID  [de-identified] : Hands and feet have improved. Much less discomfort. Able to perform ADLs. Bottom of feet still uncomfortable after walking for a while. \par Desquamation of hands resolved. Still using steroid and udderly smooth cream. \par c/o poor vision from L eye. Has not had eyes checked in a years. Was referred to optho by PCP but they do not accept insurance. Requesting optho referral.

## 2019-08-27 RX ORDER — CAPECITABINE 150 MG/1
150 TABLET, FILM COATED ORAL
Qty: 56 | Refills: 3 | Status: DISCONTINUED | COMMUNITY
Start: 2019-04-25 | End: 2019-08-27

## 2019-08-29 ENCOUNTER — APPOINTMENT (OUTPATIENT)
Dept: INTERNAL MEDICINE | Facility: CLINIC | Age: 54
End: 2019-08-29
Payer: MEDICAID

## 2019-08-29 VITALS
BODY MASS INDEX: 21.17 KG/M2 | SYSTOLIC BLOOD PRESSURE: 106 MMHG | HEIGHT: 59 IN | TEMPERATURE: 98 F | DIASTOLIC BLOOD PRESSURE: 60 MMHG | OXYGEN SATURATION: 98 % | WEIGHT: 105 LBS | HEART RATE: 76 BPM

## 2019-08-29 PROCEDURE — 99214 OFFICE O/P EST MOD 30 MIN: CPT

## 2019-08-29 RX ORDER — FLUTICASONE PROPIONATE 50 UG/1
50 SPRAY, METERED NASAL DAILY
Qty: 1 | Refills: 3 | Status: DISCONTINUED | COMMUNITY
Start: 2019-06-17 | End: 2019-08-29

## 2019-08-29 NOTE — PHYSICAL EXAM
[Right Foot Was Examined] : Right foot ~C was examined [Left Foot Was Examined] : left foot ~C was examined [None] : no ulcers in either foot were found [] : normal [TWNoteComboBox5] : +2 [TWNoteComboBox6] : +2

## 2019-08-29 NOTE — HISTORY OF PRESENT ILLNESS
[FreeTextEntry1] : DM [de-identified] : pt accompanied by daughter, Olegario\par dm-  Patient is compliant with med.- restarted meds 6/19\par  Patient  is compliant with diet.\par  Patient  is not exercising\par  She denies hypoglycemic episodes.  The patient has no dizziness/ tremor/ diaphoresis \par    the fasting glucose is not ck \par no CP or SOB\par The bedtime glucose is not ck\par The postprandial glucose is not ck\par Patient has appt for Ophthalmology tomorrow\par Patient has not been seen by podiatry.\par vaccines- rec shingrix vaccine.\par \par lipid-low\par U microalbumin- elev but BP too low to start ACE\par pt still has chronic cough- no relief w singular.  prev on omeprazole but it didn't help\par pt only having abd pain w cough=-pt was given famotidine- states abd pain is better w famotidine\par \par mammo-pt did the additional US and benign- rpt mammo 1 yr.\par discussed health care proxy w pt and daughter

## 2019-08-30 ENCOUNTER — APPOINTMENT (OUTPATIENT)
Dept: OPHTHALMOLOGY | Facility: CLINIC | Age: 54
End: 2019-08-30

## 2019-09-03 ENCOUNTER — APPOINTMENT (OUTPATIENT)
Age: 54
End: 2019-09-03

## 2019-09-03 ENCOUNTER — RESULT REVIEW (OUTPATIENT)
Age: 54
End: 2019-09-03

## 2019-09-03 ENCOUNTER — LABORATORY RESULT (OUTPATIENT)
Age: 54
End: 2019-09-03

## 2019-09-03 LAB
BASOPHILS # BLD AUTO: 0 K/UL — SIGNIFICANT CHANGE UP (ref 0–0.2)
BASOPHILS NFR BLD AUTO: 0.1 % — SIGNIFICANT CHANGE UP (ref 0–2)
EOSINOPHIL # BLD AUTO: 0.2 K/UL — SIGNIFICANT CHANGE UP (ref 0–0.5)
EOSINOPHIL NFR BLD AUTO: 2.4 % — SIGNIFICANT CHANGE UP (ref 0–6)
HCT VFR BLD CALC: 33.1 % — LOW (ref 34.5–45)
HGB BLD-MCNC: 11.3 G/DL — LOW (ref 11.5–15.5)
LYMPHOCYTES # BLD AUTO: 2.1 K/UL — SIGNIFICANT CHANGE UP (ref 1–3.3)
LYMPHOCYTES # BLD AUTO: 32.2 % — SIGNIFICANT CHANGE UP (ref 13–44)
MCHC RBC-ENTMCNC: 30.2 PG — SIGNIFICANT CHANGE UP (ref 27–34)
MCHC RBC-ENTMCNC: 34.1 G/DL — SIGNIFICANT CHANGE UP (ref 32–36)
MCV RBC AUTO: 88.6 FL — SIGNIFICANT CHANGE UP (ref 80–100)
MONOCYTES # BLD AUTO: 0.9 K/UL — SIGNIFICANT CHANGE UP (ref 0–0.9)
MONOCYTES NFR BLD AUTO: 13.7 % — SIGNIFICANT CHANGE UP (ref 2–14)
NEUTROPHILS # BLD AUTO: 3.3 K/UL — SIGNIFICANT CHANGE UP (ref 1.8–7.4)
NEUTROPHILS NFR BLD AUTO: 51.6 % — SIGNIFICANT CHANGE UP (ref 43–77)
PLATELET # BLD AUTO: 201 K/UL — SIGNIFICANT CHANGE UP (ref 150–400)
RBC # BLD: 3.74 M/UL — LOW (ref 3.8–5.2)
RBC # FLD: 16 % — HIGH (ref 10.3–14.5)
WBC # BLD: 6.4 K/UL — SIGNIFICANT CHANGE UP (ref 3.8–10.5)
WBC # FLD AUTO: 6.4 K/UL — SIGNIFICANT CHANGE UP (ref 3.8–10.5)

## 2019-09-05 ENCOUNTER — FORM ENCOUNTER (OUTPATIENT)
Age: 54
End: 2019-09-05

## 2019-09-06 ENCOUNTER — APPOINTMENT (OUTPATIENT)
Dept: RADIOLOGY | Facility: IMAGING CENTER | Age: 54
End: 2019-09-06
Payer: MEDICAID

## 2019-09-06 ENCOUNTER — OUTPATIENT (OUTPATIENT)
Dept: OUTPATIENT SERVICES | Facility: HOSPITAL | Age: 54
LOS: 1 days | End: 2019-09-06
Payer: MEDICAID

## 2019-09-06 DIAGNOSIS — R05 COUGH: ICD-10-CM

## 2019-09-06 PROCEDURE — 71046 X-RAY EXAM CHEST 2 VIEWS: CPT

## 2019-09-06 PROCEDURE — 71046 X-RAY EXAM CHEST 2 VIEWS: CPT | Mod: 26

## 2019-09-07 ENCOUNTER — OUTPATIENT (OUTPATIENT)
Dept: OUTPATIENT SERVICES | Facility: HOSPITAL | Age: 54
LOS: 1 days | Discharge: HOME | End: 2019-09-07

## 2019-09-07 DIAGNOSIS — C24.1 MALIGNANT NEOPLASM OF AMPULLA OF VATER: ICD-10-CM

## 2019-09-10 ENCOUNTER — RESULT REVIEW (OUTPATIENT)
Age: 54
End: 2019-09-10

## 2019-09-10 ENCOUNTER — APPOINTMENT (OUTPATIENT)
Dept: HEMATOLOGY ONCOLOGY | Facility: CLINIC | Age: 54
End: 2019-09-10
Payer: MEDICAID

## 2019-09-10 ENCOUNTER — APPOINTMENT (OUTPATIENT)
Age: 54
End: 2019-09-10

## 2019-09-10 VITALS
BODY MASS INDEX: 22 KG/M2 | TEMPERATURE: 98.7 F | RESPIRATION RATE: 16 BRPM | WEIGHT: 108.91 LBS | DIASTOLIC BLOOD PRESSURE: 72 MMHG | SYSTOLIC BLOOD PRESSURE: 110 MMHG | OXYGEN SATURATION: 99 % | HEART RATE: 74 BPM

## 2019-09-10 LAB
BASOPHILS # BLD AUTO: 0 K/UL — SIGNIFICANT CHANGE UP (ref 0–0.2)
BASOPHILS NFR BLD AUTO: 0.6 % — SIGNIFICANT CHANGE UP (ref 0–2)
EOSINOPHIL # BLD AUTO: 0.1 K/UL — SIGNIFICANT CHANGE UP (ref 0–0.5)
EOSINOPHIL NFR BLD AUTO: 1.2 % — SIGNIFICANT CHANGE UP (ref 0–6)
HCT VFR BLD CALC: 32.3 % — LOW (ref 34.5–45)
HGB BLD-MCNC: 11.1 G/DL — LOW (ref 11.5–15.5)
LYMPHOCYTES # BLD AUTO: 2.2 K/UL — SIGNIFICANT CHANGE UP (ref 1–3.3)
LYMPHOCYTES # BLD AUTO: 50.9 % — HIGH (ref 13–44)
MCHC RBC-ENTMCNC: 29.7 PG — SIGNIFICANT CHANGE UP (ref 27–34)
MCHC RBC-ENTMCNC: 34.3 G/DL — SIGNIFICANT CHANGE UP (ref 32–36)
MCV RBC AUTO: 86.4 FL — SIGNIFICANT CHANGE UP (ref 80–100)
MONOCYTES # BLD AUTO: 0.5 K/UL — SIGNIFICANT CHANGE UP (ref 0–0.9)
MONOCYTES NFR BLD AUTO: 11.5 % — SIGNIFICANT CHANGE UP (ref 2–14)
NEUTROPHILS # BLD AUTO: 1.6 K/UL — LOW (ref 1.8–7.4)
NEUTROPHILS NFR BLD AUTO: 35.8 % — LOW (ref 43–77)
PLATELET # BLD AUTO: 166 K/UL — SIGNIFICANT CHANGE UP (ref 150–400)
RBC # BLD: 3.74 M/UL — LOW (ref 3.8–5.2)
RBC # FLD: 15.9 % — HIGH (ref 10.3–14.5)
WBC # BLD: 4.3 K/UL — SIGNIFICANT CHANGE UP (ref 3.8–10.5)
WBC # FLD AUTO: 4.3 K/UL — SIGNIFICANT CHANGE UP (ref 3.8–10.5)

## 2019-09-10 PROCEDURE — 99214 OFFICE O/P EST MOD 30 MIN: CPT

## 2019-09-11 DIAGNOSIS — Z51.11 ENCOUNTER FOR ANTINEOPLASTIC CHEMOTHERAPY: ICD-10-CM

## 2019-09-18 ENCOUNTER — OUTPATIENT (OUTPATIENT)
Dept: OUTPATIENT SERVICES | Facility: HOSPITAL | Age: 54
LOS: 1 days | End: 2019-09-18

## 2019-09-18 ENCOUNTER — APPOINTMENT (OUTPATIENT)
Dept: ORTHOPEDIC SURGERY | Facility: HOSPITAL | Age: 54
End: 2019-09-18

## 2019-09-18 VITALS
SYSTOLIC BLOOD PRESSURE: 115 MMHG | DIASTOLIC BLOOD PRESSURE: 62 MMHG | HEART RATE: 72 BPM | BODY MASS INDEX: 21.41 KG/M2 | WEIGHT: 106 LBS

## 2019-09-18 DIAGNOSIS — M75.02 ADHESIVE CAPSULITIS OF LEFT SHOULDER: ICD-10-CM

## 2019-09-18 NOTE — PHYSICAL EXAM
[Restricted in physically strenuous activity but ambulatory and able to carry out work of a light or sedentary nature] : Status 1- Restricted in physically strenuous activity but ambulatory and able to carry out work of a light or sedentary nature, e.g., light house work, office work [Normal] : full range of motion and no deformities appreciated [de-identified] : much less erythema, dry skin

## 2019-09-18 NOTE — HISTORY OF PRESENT ILLNESS
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 \par \par Initially was admitted to [facility] on 12/18/18 with dizziness, found to have elevated LFTs. MRCP demonstrated a dilatation of the CBD. ERCP on 12/22/18 performed c/w ampullary mass with obstruction. Pathology c/w adenocarcinoma. Pt was discharged for out pt follow up with surg onc and med onc but was re-admitted on 1/5/19 with n/v, worsening abdominal pain, found to have severe sepsis with E coli bacteremia 2/2 acute cholangitis. \par 1/7: ERCP performed. Stent was removed from the biliary tree. One plastic stent placed\par 1/5 CT A/P Heterogeneity of the right hepatic lobe with questionable 1.3 cm hypoattenuating lesion in segment 7.\par 1/8 MRI Abd: No suspicious liver lesions. No abnormality is identified to correspond with questionable lesion identified on prior CT. 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  Taken to the OR for evacuation of hematoma, cessation of gastroduodenal artery bleeding with two hemostatic stitches, and placement of abthera VAC.  Required pressors in the SICU, then developed fever. \par 1/21-1/22: OR exploratory laparotomy, drainage of abdominal abscess, repair of abdominal wall hernia with Strattice mesh, b/ abdominal wall advancement flaps. Post-op patient remained intubated and off vasopressors. FENA calculated to be pre-renal. 500CC albumin bolus given, urine output improved. 1/22/19 attempted spontaneous breathing trials but pt hypoxic. Pt lactate uptrending, resuscitated w/IVF, and pt hypotensive requiring pressors. UCx growing candida, Diflucan changed to micafungin to r/o resistant organisms in the setting of worsening sepsis. \par 1/23/19, patient noted to have blood oozing from midline incision while hypotensive and on pressors. Peak pressures noted to be elevated on vent and abdomen increasingly distended. All staples removed and a large amount of clot evacuated from midline incision. 1U PRBC given. No active bleeding appreciated. Fascia remains closed. Wound packed with wet to dry dressing. Vitamin K given for elevated INR. \par 1/24-1/29: remained in SICU, started on TPN, diuresed, finally extubated. \par 2/1: septic shock again, pressors restarted\par 2/2-2/11: CT scan with peritonitis, new abscesses. Ab and Antifungals started. Wound with increase in drainage. Octreotide started for increased output \par 2/12: drainage of abscesses by IR.  \par 2/18: Wound was partially closed by plastics with interrupted sutures and an ostomy appliance was placed, VAC removed.\par 3/1/19. d/c home \par \par 4/30/19: C1D1 Gemzar/Xeloda \par 5/7/19: C1D8 Gemzar/Xeloda \par 5/21/19: C2D1\par 5/28/19: C2D8\par 6/11/19: C3D1\par 6/18/19: C3D8\par 7/2/19: C4D1\par 7/9/19: C4D8 Xeloda held 2/2 grade 2-3 hand/foot syndrome \par 7/23/19: C5D1 Xeloda still held \par 7/30/19: C5D8 \par 8/13/19: C6D1\par 8/20/19: C6D8 Xeloda restarted 1000 mg BID\par 9/3/19: C7D1\par 9/10/19: C7D8\par  [de-identified] : adenocarcinoma [de-identified] : CA 19-9 34 [de-identified] : FINAL PATHOLOGY:\par -Invasive adenocarcinoma of the duodenal ampulla (Tumor size: 2.5 x 1.5 x 0.8 cm); Tumor stage: pT3b pN1\par -Vascular and perineural invasion identified\par -1/24 lymph nodes involved by adenocarcinoma\par -Negative margins\par -S/p cholecystectomy ; acute cholecystitis with cholelithiasis \par -Bile duct margin with acute inflammation and focal atypia consistent with low grade dysplasia. \par  [de-identified] : restarted last cycle capecitabine 1000mg bid on 8/20, held after running out until  9/3 for remainder of cycle\par palms and feet with dryness and mild peeling, intermittent discomfort but tolerable. No nausea or diarrhea \par gained 4 lbs since last visit. Denies any pain.  [FreeTextEntry1] : Xeloda at dose reduced 1000 mg BID 8/20/19

## 2019-09-18 NOTE — REVIEW OF SYSTEMS
[Fatigue] : fatigue [Abdominal Pain] : abdominal pain [Joint Stiffness] : joint stiffness [Joint Pain] : joint pain [Skin Rash] : skin rash [Negative] : Heme/Lymph

## 2019-09-18 NOTE — CONSULT LETTER
[Dear  ___] : Dear  [unfilled], [Please see my note below.] : Please see my note below. [Courtesy Letter:] : I had the pleasure of seeing your patient, [unfilled], in my office today. [Sincerely,] : Sincerely, [Consult Closing:] : Thank you very much for allowing me to participate in the care of this patient.  If you have any questions, please do not hesitate to contact me. [FreeTextEntry3] : Katty Navarro D.O. \par Attending Physician \par [facility]\par  \par [facility] \par Tel: [phone]\par Fax: [phone]\par

## 2019-09-24 ENCOUNTER — LABORATORY RESULT (OUTPATIENT)
Age: 54
End: 2019-09-24

## 2019-09-24 ENCOUNTER — RESULT REVIEW (OUTPATIENT)
Age: 54
End: 2019-09-24

## 2019-09-24 ENCOUNTER — APPOINTMENT (OUTPATIENT)
Age: 54
End: 2019-09-24

## 2019-09-24 LAB
BASOPHILS # BLD AUTO: 0 K/UL — SIGNIFICANT CHANGE UP (ref 0–0.2)
BASOPHILS NFR BLD AUTO: 0.2 % — SIGNIFICANT CHANGE UP (ref 0–2)
EOSINOPHIL # BLD AUTO: 0.2 K/UL — SIGNIFICANT CHANGE UP (ref 0–0.5)
EOSINOPHIL NFR BLD AUTO: 2.7 % — SIGNIFICANT CHANGE UP (ref 0–6)
HCT VFR BLD CALC: 30.8 % — LOW (ref 34.5–45)
HGB BLD-MCNC: 10.7 G/DL — LOW (ref 11.5–15.5)
LYMPHOCYTES # BLD AUTO: 1.7 K/UL — SIGNIFICANT CHANGE UP (ref 1–3.3)
LYMPHOCYTES # BLD AUTO: 28.3 % — SIGNIFICANT CHANGE UP (ref 13–44)
MCHC RBC-ENTMCNC: 30.5 PG — SIGNIFICANT CHANGE UP (ref 27–34)
MCHC RBC-ENTMCNC: 34.7 G/DL — SIGNIFICANT CHANGE UP (ref 32–36)
MCV RBC AUTO: 87.9 FL — SIGNIFICANT CHANGE UP (ref 80–100)
MONOCYTES # BLD AUTO: 1 K/UL — HIGH (ref 0–0.9)
MONOCYTES NFR BLD AUTO: 15.7 % — HIGH (ref 2–14)
NEUTROPHILS # BLD AUTO: 3.2 K/UL — SIGNIFICANT CHANGE UP (ref 1.8–7.4)
NEUTROPHILS NFR BLD AUTO: 53 % — SIGNIFICANT CHANGE UP (ref 43–77)
PLATELET # BLD AUTO: 147 K/UL — LOW (ref 150–400)
RBC # BLD: 3.5 M/UL — LOW (ref 3.8–5.2)
RBC # FLD: 17 % — HIGH (ref 10.3–14.5)
WBC # BLD: 6.1 K/UL — SIGNIFICANT CHANGE UP (ref 3.8–10.5)
WBC # FLD AUTO: 6.1 K/UL — SIGNIFICANT CHANGE UP (ref 3.8–10.5)

## 2019-09-27 ENCOUNTER — APPOINTMENT (OUTPATIENT)
Dept: OPHTHALMOLOGY | Facility: CLINIC | Age: 54
End: 2019-09-27

## 2019-09-28 ENCOUNTER — RX RENEWAL (OUTPATIENT)
Age: 54
End: 2019-09-28

## 2019-09-30 ENCOUNTER — APPOINTMENT (OUTPATIENT)
Dept: INTERNAL MEDICINE | Facility: CLINIC | Age: 54
End: 2019-09-30
Payer: MEDICAID

## 2019-09-30 VITALS
HEIGHT: 59 IN | OXYGEN SATURATION: 98 % | BODY MASS INDEX: 21.17 KG/M2 | WEIGHT: 105 LBS | HEART RATE: 74 BPM | TEMPERATURE: 97.4 F | DIASTOLIC BLOOD PRESSURE: 76 MMHG | SYSTOLIC BLOOD PRESSURE: 118 MMHG

## 2019-09-30 DIAGNOSIS — E11.9 TYPE 2 DIABETES MELLITUS W/OUT COMPLICATIONS: ICD-10-CM

## 2019-09-30 PROCEDURE — 83036 HEMOGLOBIN GLYCOSYLATED A1C: CPT | Mod: QW

## 2019-09-30 PROCEDURE — 99214 OFFICE O/P EST MOD 30 MIN: CPT | Mod: 25

## 2019-09-30 PROCEDURE — 82962 GLUCOSE BLOOD TEST: CPT

## 2019-09-30 RX ORDER — METFORMIN HYDROCHLORIDE 500 MG/1
500 TABLET, COATED ORAL
Qty: 1 | Refills: 2 | Status: DISCONTINUED | COMMUNITY
Start: 2019-06-19 | End: 2019-09-30

## 2019-09-30 NOTE — PHYSICAL EXAM
[Left Foot Was Examined] : left foot ~C was examined [Right Foot Was Examined] : Right foot ~C was examined [None] : no ulcers in either foot were found [] : normal [TWNoteComboBox5] : +2 [TWNoteComboBox6] : +2

## 2019-09-30 NOTE — PLAN
[FreeTextEntry1] : pt is supposed to get chemo tomorrow - so will not give flu vaccine today\par f/u w specialialist\par restart meds.\par [id number]\par hgA1C= 6.0- pt is pre diabetic.  will stop metformin.

## 2019-09-30 NOTE — HISTORY OF PRESENT ILLNESS
[FreeTextEntry1] : DM [de-identified] : pt accompanied by her daughter, Olegario.- who served as \par \par DM- pt ran out of metformin for 1mo.  it was sent to [facility] but pt states only chemo meds at [facility]\par pt has not been checking glucose for 3 months. no CP or SOB\par compliant w diet\par \par Shoulder pain- pt was seen by Ortho- prob adhesive capsulits- pt is waiting for PT\par \par chronic cough- CXR was neg.  no relief w singular, omeprazole.  used singular for 6 mo.  used PPI for 1-2 mo\par abd pain- hematologist states it could be from scarring.  pt to f/u w GI

## 2019-10-01 ENCOUNTER — LABORATORY RESULT (OUTPATIENT)
Age: 54
End: 2019-10-01

## 2019-10-01 ENCOUNTER — RESULT REVIEW (OUTPATIENT)
Age: 54
End: 2019-10-01

## 2019-10-01 ENCOUNTER — APPOINTMENT (OUTPATIENT)
Dept: HEMATOLOGY ONCOLOGY | Facility: CLINIC | Age: 54
End: 2019-10-01
Payer: MEDICAID

## 2019-10-01 ENCOUNTER — APPOINTMENT (OUTPATIENT)
Age: 54
End: 2019-10-01

## 2019-10-01 VITALS
DIASTOLIC BLOOD PRESSURE: 70 MMHG | TEMPERATURE: 97.4 F | OXYGEN SATURATION: 99 % | RESPIRATION RATE: 16 BRPM | SYSTOLIC BLOOD PRESSURE: 107 MMHG | BODY MASS INDEX: 21.6 KG/M2 | WEIGHT: 106.92 LBS | HEART RATE: 75 BPM

## 2019-10-01 LAB
BASOPHILS NFR BLD AUTO: 1 % — SIGNIFICANT CHANGE UP (ref 0–2)
EOSINOPHIL # BLD AUTO: 0.1 K/UL — SIGNIFICANT CHANGE UP (ref 0–0.5)
GLUCOSE BLDC GLUCOMTR-MCNC: 160
HBA1C MFR BLD HPLC: 6
HCT VFR BLD CALC: 32.2 % — LOW (ref 34.5–45)
HGB BLD-MCNC: 11.2 G/DL — LOW (ref 11.5–15.5)
LYMPHOCYTES # BLD AUTO: 2.1 K/UL — SIGNIFICANT CHANGE UP (ref 1–3.3)
LYMPHOCYTES # BLD AUTO: 59 % — HIGH (ref 13–44)
MCHC RBC-ENTMCNC: 29.9 PG — SIGNIFICANT CHANGE UP (ref 27–34)
MCHC RBC-ENTMCNC: 34.7 G/DL — SIGNIFICANT CHANGE UP (ref 32–36)
MCV RBC AUTO: 86.3 FL — SIGNIFICANT CHANGE UP (ref 80–100)
MONOCYTES # BLD AUTO: 0.5 K/UL — SIGNIFICANT CHANGE UP (ref 0–0.9)
MONOCYTES NFR BLD AUTO: 11 % — SIGNIFICANT CHANGE UP (ref 2–14)
NEUTROPHILS # BLD AUTO: 1.2 K/UL — LOW (ref 1.8–7.4)
NEUTROPHILS NFR BLD AUTO: 29 % — LOW (ref 43–77)
PLAT MORPH BLD: NORMAL — SIGNIFICANT CHANGE UP
PLATELET # BLD AUTO: 141 K/UL — LOW (ref 150–400)
RBC # BLD: 3.74 M/UL — LOW (ref 3.8–5.2)
RBC # FLD: 16.2 % — HIGH (ref 10.3–14.5)
RBC BLD AUTO: SIGNIFICANT CHANGE UP
WBC # BLD: 3.8 K/UL — SIGNIFICANT CHANGE UP (ref 3.8–10.5)
WBC # FLD AUTO: 3.8 K/UL — SIGNIFICANT CHANGE UP (ref 3.8–10.5)

## 2019-10-01 PROCEDURE — 99214 OFFICE O/P EST MOD 30 MIN: CPT

## 2019-10-02 ENCOUNTER — TRANSCRIPTION ENCOUNTER (OUTPATIENT)
Age: 54
End: 2019-10-02

## 2019-10-09 ENCOUNTER — FORM ENCOUNTER (OUTPATIENT)
Age: 54
End: 2019-10-09

## 2019-10-10 ENCOUNTER — APPOINTMENT (OUTPATIENT)
Dept: CT IMAGING | Facility: IMAGING CENTER | Age: 54
End: 2019-10-10
Payer: MEDICAID

## 2019-10-10 ENCOUNTER — OUTPATIENT (OUTPATIENT)
Dept: OUTPATIENT SERVICES | Facility: HOSPITAL | Age: 54
LOS: 1 days | End: 2019-10-10
Payer: MEDICAID

## 2019-10-10 DIAGNOSIS — C24.1 MALIGNANT NEOPLASM OF AMPULLA OF VATER: ICD-10-CM

## 2019-10-10 PROCEDURE — 74177 CT ABD & PELVIS W/CONTRAST: CPT | Mod: 26

## 2019-10-10 PROCEDURE — 71260 CT THORAX DX C+: CPT | Mod: 26

## 2019-10-10 PROCEDURE — 74177 CT ABD & PELVIS W/CONTRAST: CPT

## 2019-10-10 PROCEDURE — 82565 ASSAY OF CREATININE: CPT

## 2019-10-10 PROCEDURE — 71260 CT THORAX DX C+: CPT

## 2019-10-14 NOTE — PHYSICAL EXAM
[Normal] : supple, no neck mass and thyroid not enlarged [Normal] : oriented to person, place and time, with appropriate affect [de-identified] : soft NT, ND; well healed incision

## 2019-10-14 NOTE — HISTORY OF PRESENT ILLNESS
[de-identified] : 54 year old female presents for an ongoing post op visit. \par \par - HOSPITAL COURSE- Hospitalized at [facility] from 1/5/19-3/5/19\par 53 y.o. 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 Patient had acute onset of RUQ pain yesterday, with no inciting factors or relief with aleve. The patient's family also endorsed 1-2 episodes of NBNB emesis yesterday and has had decreased PO intake over the last 36 hours. As of diagnosis of cancer, the patient has not had follow up with oncology or surg-oncology due insurance issues. The patient has an appointment with [facility] on Wednesday for surgical evaluation. In the ED, patient febrile to 101.5, HR 98, BPs decreased to 98/41, found with leukocytosis to 15.2, lactate 4.4. She was found with RUQ pain, fever, and jaundice (Bilirubin 3.9) consistent with acute cholangitis. CT A/P revealed Hyperattenuation and mild wall thickening involving the CBD raises the possibility of cholangitis with pneumobilia related to recent ERCP. In the ED, patient received IVF, vanc/zosyn, morphine, and zofran. Patient admitted with severe sepsis 2/2 acute cholangitis. MICU was consulted for hypotension, but this improved s/p fluids and IV antibiotics and patient did not warrant MICU level care. GI was consulted and recommended ERCP. The patient was maintained on zosyn for IV antibiotics. Patient remained stable on zosyn, white count trended down, lactate improved, and fevers were resolving. BCs ultimately grew E.Coli. 1/7: ERCP performed. Stent was removed from the biliary tree. One plastic stent was placed into the common bile duct. The major papilla appeared to have a mass.Oncology was consulted given patient's poor outpatient oncology follow up due to insurance issues, they recommended CT scan and  MRI abdomen/pelvis with contrast for continued staging of cancer workup. \par \par 1/8/19 CT A/P - showed Small bilateral pleural effusions with bibasilar atelectasis. No lung nodules. Mildly enlarged right cardiophrenic angle lymph node is unchanged since December 18, 2018 is indeterminate\par \par 1/8/19  MRI-  IMPRESSION: *  No suspicious liver lesions. No abnormality is identified to correspond with questionable lesion identified on prior CT. * Intra and extrahepatic biliary ductal dilation, with mural enhancement which may be seen in the setting of cholangitis. *  Cholelithiasis with gallbladder wall thickening.Pt  transferred to surgical oncology service and went to the OR on 1/11/19. \par \par  1/11/19- S/p ex-lap, Whipple pancreaticoduodenectomy for duodenal adenocarcinoma. \par \par FINAL PATHOLOGY:\par -Invasive adenocarcinoma of the duodenal ampulla (Tumor size: 2.5 x 1.5 x 0.8 cm); Tumor stage: pT3b pN1\par -Vascular and perineural invasion identified\par -1/24 lymph nodes involved by adenocarcinoma\par -Negative margins\par -S/p cholecystectomy ; acute cholecystitis with cholelithiasis \par -Bile duct margin with acute inflammation and focal atypia consistent with low grade dysplasia. \par \par Intraop findings: pylorus sparing Whipple.  Pt tolerated procedure well, without complication.\par \par On 1/18/19, patient was a surgical rapid response for which pt transferred to SICU. Patient was febrile to 102. Cultures sent. Antibiotics changed from zosyn to meropenem. Patient was tachypneic and hypotensive to 70s systolic. CBC revealed drop in H/H to 6/19 with elevated lactate of 9. Decision was made to intubate patient. Massive Transfusion protocol initiated. Patient received 4PRBC 5FFP and 1platelet, and bolus 1L of fluid. Surgical team and attending notified when Peak pressures on vent were 50s and increased abdominal distention. Decision was then made to take patient emergently to OR. Patient had evacuation of hematoma, cessation of gastroduodenal artery bleeding with two hemostatic stitches, and placement of abthera VAC. Pt transferred to SICU post op. Patient hypotensive post-op with low UOP. 2 L bolus given.  CBC remained stable. Patient hypotensive to 80s systolic, [place] started and increased, patient was then transitioned to levo. Lactate downtrended to 7.6 and H/H stable. \par \par On 1/19 pt cont to require pressors, resuscitated with IVF, and had drop H/H for which transfused. On 1/21/19, Pt febrile to 100.6 overnight, cultures sent, UA+ nitrite, follow up urine cultures. \par \par Pt RTOR on 1/21/19 for Exploratory laparotomy, drainage of abdominal abscess, repair of abdominal wall hernia with Strattice mesh, b/ abdominal wall advancement flaps. Post-op patient remained intubated and  off vasopressors. FENA calculated to be pre-renal. 500CC albumin bolus given, urine output improved. 1/22/19 attempted spontaneous breathing trials but pt hypoxic. Pt lactate uptrending, resuscitated w/IVF, and pt hypotensive requiring pressors. UCx growing candida, Diflucan changed to micafungin to r/o resistant organisms in the setting of worsening sepsis. On 1/23/19, patient noted to have blood oozing from \par midline incision while hypotensive and on pressors.  Peak pressures noted to be elevated on vent and abdomen increasingly distended.  All staples removed and a large amount of clot evacuated from midline incision. 1U PRBC given. No active bleeding appreciated.  Fascia remains closed.  Wound packed with wet to dry dressing.  Vitamin K given for elevated INR.  1/24/19 nutrition consulted and pt. started on TPN. Plastic consulted bc midline opened for hematoma evacuation yesterday, external tissue expander device (Dermaclose) applied to the wound. Patient hypotensive requiring reinitiation of low dose vasopressors. On 1/25/19, sedatives and pressors were decreased. Pt was volume overloaded and was given Albumin, Lasix, and a Bumex drip. O/N, pt febrile 102, given tylenol. mildly hypotensive SBP 80, started on small dose levo. 1/27 Cont SBT. Decision to continue diuresis with Diamox 1/28 Patient febrile to 101 today. Diuresing with lasix/bumex, giving albumin. Continued SBTs.  On 1/29/19, patient tolerating CPAP. Patient extubated without issue. NGT was removed. Patient started on clears, continue TPN while caloric intake is still low. 1/30/19 CT scan performed and showed b/l moderate pleural effusions with associated atelectasis.  Partially \par imaged right lower lung patchy opacity may represent small infiltrate in the appropriate clinical setting. Moderate abdominopelvic ascites most prominent subjacent to the anterior  abdominal wall.  Left upper and lower quadrant collection measuring greater than fluid attenuation, which may reflect the presence of hemorrhagic products. Mild enhancement is seen in association with \par the anterior abdominal ascites, dependent pelvic ascites, and left mid abdominal collection, consistent with the presence of peritonitis. Findings may be postoperative or related to the presence of hemorrhagic products.Pt started on po labetalol for persistent HTN, however pt with hypotensive episode in PM shortly after administration. Patient given albumin. 2/1/19, persistently febrile, Tmax 101.3, hypotensive, tachycardic, and tachypneac. Albumin and IVF given, BP did not respond. Levophed restarted. JOURDAN X 2 started to drain copious amounts of bilious outpt. JOURDAN #1 700cc and JOURDAN #1 500cc. Given stat dose of vancomycin and \par meropenum.  2/2/19 CT scan performed and showed New diffuse peritoneal enhancement, compatible with peritonitis, more extensive compared to 1/30/2019. Multiple abdominal and pelvic fluid collections consistent with abscesses. New small bowel wall may also edema and colonic wall thickening, likely reactive. Left upper quadrant collection measuring greater than fluid attenuation is grossly unchanged from 1/30/2019 .On 2/3/19 she received 2 u prbc overnight, placed on BIPAP overnight,  vanc \par started overnight, start diflucan, vaso and levo started overnight, ABG and CBC, PM labs, A line placed overnight. Plan for IR drainage tomorrow. On 2/4/19: on pressors overnight, removed in AM. IR today for abscess drainage. advance tiger tube, NGT removed. repeat labs, remove a line after procedure. Increased serobilious wound drainage, BID dressing changes.2/5/19: diflucan to micafungin. decreased seroquil to 25. IV lock. start trickle feeds glucerna. 2/6: no further diuresis at this time, high bilious midline wound output, vac to be placed today by plastics. Sinha out. feeds to goal today.2/7: Central line and sinha removed, discontinued Seroquel, feeds changed to continuous 2/8: Octreotide started for high output from midline wound. Lasix with goal net negative 2L2/9: Resent blood cx and UA, bedside vac change by plastics today.2/11: Patient ordered for CLD w/ TF via NJT, upper and lower portion of abdominal wound closed by plastics, send left JOURDAN for amylase 2/12: CT A&P shows two collections... drainage by IR today. Drain was upsized on the right and left  collection 50cc of pus was drained.2/13: Stable for dispo to floor. 2/15: ID was consulted and recommended Meropenem and to continue Vancomycin for better source control. Patient was also seen by PM&R and they recommended FARIBA if patient is able to participate in care.2/18: Wound was partially closed by plastics with interrupted sutures and an ostomy appliance was placed, VAC removed.2/19 Antibiotics was changed to Meropenem and Zyvox, culture grew VRE.2/20: Patient had a tube check and the LUQ drain was removed. CT showed improvement in abdominal collections. Calorie count was performed. Patient is tolerating a regular diet with GLucerna shakes. Octreotide discontinued on 2/26.2/28: Tube check done and RLQ drain was removed.Patient will be discharged home. Patient will have VNS for wound care and PT.  She will be discharged on 1 more week of antibiotics (Cipro and Flagyl per ID).  Discharged home on 3/1/19. \par \par INTERVAL HISTORY:\par 3/12/19 -  Doing better.  Tolerating PO diet.  + GI fxn.  Pain well controlled.  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/day.\par \par Recent Labs 3/14/19:\par WBC:   9.4 K/uL\par HGB:  9.0 g/dL\par RBC:  3.07 M/uL\par HCT:  25.9%\par PLT:  298 K/uL\par \par Metabolic:\par Na:  141 mmol/L\par K:  3.8 mmol/L\par Cl:  101 mmol/L\par CO2:  27 mmol/L\par Anion Gap:  13 mmol/L\par Glucose:  139 mg/dL\par BUN:  7 mg/dL\par Creat:  0.38 mg/dL\par Total Protein:  7.3 g/dL\par Albumin:  3.4 g/dL\par Serum Ca:  9.3 mg/dL\par Total Bili:  0.8 mg/dL\par AST:  34 U/L\par ALT:  14 U/L\par ALK Phos:  382 U/L\par eGFR:  121 mL/min/1.73M2\par \par CA 19-9:  42 U/mL\par CEA:  2.0 ng/mL\par \par Hepatitis C:  Nonreactive\par Hepatitis B Core Ab:  Nonreactive\par Hepatitis B Surface Ag:  Nonreactive\par \par APTT:  31.8 sec\par PT:  13.2 sec\par INR:  1.16 ratio\par \par 3/19/19: Today, Ms. Lagunas reports occasional nausea, no vomiting, no pain.  Abdominal wall fistula drained last on Saturday nigt 8 cc.  Not drained since.  Daughter states color of drainage has changed from creamy to tan color.  Denies, fevers, chills.   During this visit, sutures removed from midline incision, as well as drain removed (ostomy appliance).  Wound well healing with granulation tissue in the periphery of wound bed with yellow slough noted in bottom of wound bed.  Wound packed with wet to dry dressing.  Instruction and demonstration provided to patient's daughter on changing wet to dry dressing, however, advised she may return to the ostomy appliance if she feels drainage is increasing.  She will return to office to see me in 2 weeks for follow up.  \par \par INTERVAL HISTORY:\par 4/2/19- Daughter continues to change midline wound with wet to dry gauze dressing daily.  The patient is with c/o 7-8 out of 10 abdominal pain with movement and activity, partially relieved with Oxycodone.  She denies fever or chills.  Denies nausea or vomiting. Having daily Bm's and passing flatus.  Appetite continues to improve. The daughter states she is eating an adequate amount of food.  She is scheduled to see Dr. Katty Navarro (Heme-onc) tomorrow. \par \par 4/21/19 - MRI Abdomen- No evidence of hepatic metastatic disease.\par \par 4/30/19 - Feeling well. Incisions well healed. Scheduled to start chemotherapy under the care of Dr. Katty Navarro.  Denies abdominal pain, nausea, vomiting or changes in bowel habits. \par \par

## 2019-10-14 NOTE — ASSESSMENT
[FreeTextEntry1] : 54 year old woman s/p Whipple procedure for T3N1 (1/24 lymph nodes positive) ampullary adenocarcinoma, complicated by GDA bleed RTOR for oversewing of vessel, abdominal wall bleed requiring opening of the incision and secondary closure with Dermaclose, developed low output ECF.  Well healed incisions.  Scheduled to start chemotherapy with Dr. Katty Navarro next week. \par \par Plan:\par 1) Chemo as per Dr. Katty Navarro \par 2) Increase PO intake and activity\par 3) RTO in 3-6 months\par \par

## 2019-10-15 ENCOUNTER — APPOINTMENT (OUTPATIENT)
Age: 54
End: 2019-10-15

## 2019-10-22 ENCOUNTER — OUTPATIENT (OUTPATIENT)
Dept: OUTPATIENT SERVICES | Facility: HOSPITAL | Age: 54
LOS: 1 days | Discharge: HOME | End: 2019-10-22

## 2019-10-22 ENCOUNTER — APPOINTMENT (OUTPATIENT)
Age: 54
End: 2019-10-22

## 2019-10-22 DIAGNOSIS — C24.1 MALIGNANT NEOPLASM OF AMPULLA OF VATER: ICD-10-CM

## 2019-10-22 NOTE — HISTORY OF PRESENT ILLNESS
[Disease: _____________________] : Disease: [unfilled] [T: ___] : T[unfilled] [M: ___] : M[unfilled] [N: ___] : N[unfilled] [AJCC Stage: ____] : AJCC Stage: [unfilled] [Therapy: ___] : Therapy: [unfilled] [Day: ___] : Day: [unfilled] [Cycle: ___] : Cycle: [unfilled] [de-identified] : 54 F w/ DM, HTN diagnosed with ampullary carcinoma in December 2018. \par \par Initially was admitted to [facility] on 12/18/18 with dizziness, found to have elevated LFTs. MRCP demonstrated a dilatation of the CBD. ERCP on 12/22/18 performed c/w ampullary mass with obstruction. Pathology c/w adenocarcinoma. Pt was discharged for out pt follow up with surg onc and med onc but was re-admitted on 1/5/19 with n/v, worsening abdominal pain, found to have severe sepsis with E coli bacteremia 2/2 acute cholangitis. \par 1/7: ERCP performed. Stent was removed from the biliary tree. One plastic stent placed\par 1/5 CT A/P Heterogeneity of the right hepatic lobe with questionable 1.3 cm hypoattenuating lesion in segment 7.\par 1/8 MRI Abd: No suspicious liver lesions. No abnormality is identified to correspond with questionable lesion identified on prior CT. 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  Taken to the OR for evacuation of hematoma, cessation of gastroduodenal artery bleeding with two hemostatic stitches, and placement of abthera VAC.  Required pressors in the SICU, then developed fever. \par 1/21-1/22: OR exploratory laparotomy, drainage of abdominal abscess, repair of abdominal wall hernia with Strattice mesh, b/ abdominal wall advancement flaps. Post-op patient remained intubated and off vasopressors. FENA calculated to be pre-renal. 500CC albumin bolus given, urine output improved. 1/22/19 attempted spontaneous breathing trials but pt hypoxic. Pt lactate uptrending, resuscitated w/IVF, and pt hypotensive requiring pressors. UCx growing candida, Diflucan changed to micafungin to r/o resistant organisms in the setting of worsening sepsis. \par 1/23/19, patient noted to have blood oozing from midline incision while hypotensive and on pressors. Peak pressures noted to be elevated on vent and abdomen increasingly distended. All staples removed and a large amount of clot evacuated from midline incision. 1U PRBC given. No active bleeding appreciated. Fascia remains closed. Wound packed with wet to dry dressing. Vitamin K given for elevated INR. \par 1/24-1/29: remained in SICU, started on TPN, diuresed, finally extubated. \par 2/1: septic shock again, pressors restarted\par 2/2-2/11: CT scan with peritonitis, new abscesses. Ab and Antifungals started. Wound with increase in drainage. Octreotide started for increased output \par 2/12: drainage of abscesses by IR.  \par 2/18: Wound was partially closed by plastics with interrupted sutures and an ostomy appliance was placed, VAC removed.\par 3/1/19. d/c home \par \par 4/30-10/21/19: s/p 8 cycles Gemzar/Xeloda  [de-identified] : adenocarcinoma [de-identified] : CA [id number] [de-identified] : FINAL PATHOLOGY:\par -Invasive adenocarcinoma of the duodenal ampulla (Tumor size: 2.5 x 1.5 x 0.8 cm); Tumor stage: pT3b pN1\par -Vascular and perineural invasion identified\par -1/24 lymph nodes involved by adenocarcinoma\par -Negative margins\par -S/p cholecystectomy ; acute cholecystitis with cholelithiasis \par -Bile duct margin with acute inflammation and focal atypia consistent with low grade dysplasia. \par  [FreeTextEntry1] : Xeloda restarted today at dose reduced 1000 mg BID  [de-identified] : Very tired after the last treatment. Spent most of the week in bed. \par + firm stools despite colace and senna\par Ongoing abdominal discomfort. Pending to see GI. \par Has a f/u with Dr. Buorne in Jan.

## 2019-10-22 NOTE — HISTORY OF PRESENT ILLNESS
[Disease: _____________________] : Disease: [unfilled] [T: ___] : T[unfilled] [N: ___] : N[unfilled] [M: ___] : M[unfilled] [AJCC Stage: ____] : AJCC Stage: [unfilled] [Therapy: ___] : Therapy: [unfilled] [Day: ___] : Day: [unfilled] [Cycle: ___] : Cycle: [unfilled] [de-identified] : 54 F w/ DM, HTN diagnosed with ampullary carcinoma in December 2018. \par \par Initially was admitted to [facility] on 12/18/18 with dizziness, found to have elevated LFTs. MRCP demonstrated a dilatation of the CBD. ERCP on 12/22/18 performed c/w ampullary mass with obstruction. Pathology c/w adenocarcinoma. Pt was discharged for out pt follow up with surg onc and med onc but was re-admitted on 1/5/19 with n/v, worsening abdominal pain, found to have severe sepsis with E coli bacteremia 2/2 acute cholangitis. \par 1/7: ERCP performed. Stent was removed from the biliary tree. One plastic stent placed\par 1/5 CT A/P Heterogeneity of the right hepatic lobe with questionable 1.3 cm hypoattenuating lesion in segment 7.\par 1/8 MRI Abd: No suspicious liver lesions. No abnormality is identified to correspond with questionable lesion identified on prior CT. 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  Taken to the OR for evacuation of hematoma, cessation of gastroduodenal artery bleeding with two hemostatic stitches, and placement of abthera VAC.  Required pressors in the SICU, then developed fever. \par 1/21-1/22: OR exploratory laparotomy, drainage of abdominal abscess, repair of abdominal wall hernia with Strattice mesh, b/ abdominal wall advancement flaps. Post-op patient remained intubated and off vasopressors. FENA calculated to be pre-renal. 500CC albumin bolus given, urine output improved. 1/22/19 attempted spontaneous breathing trials but pt hypoxic. Pt lactate uptrending, resuscitated w/IVF, and pt hypotensive requiring pressors. UCx growing candida, Diflucan changed to micafungin to r/o resistant organisms in the setting of worsening sepsis. \par 1/23/19, patient noted to have blood oozing from midline incision while hypotensive and on pressors. Peak pressures noted to be elevated on vent and abdomen increasingly distended. All staples removed and a large amount of clot evacuated from midline incision. 1U PRBC given. No active bleeding appreciated. Fascia remains closed. Wound packed with wet to dry dressing. Vitamin K given for elevated INR. \par 1/24-1/29: remained in SICU, started on TPN, diuresed, finally extubated. \par 2/1: septic shock again, pressors restarted\par 2/2-2/11: CT scan with peritonitis, new abscesses. Ab and Antifungals started. Wound with increase in drainage. Octreotide started for increased output \par 2/12: drainage of abscesses by IR.  \par 2/18: Wound was partially closed by plastics with interrupted sutures and an ostomy appliance was placed, VAC removed.\par 3/1/19. d/c home \par \par 4/30-10/21/19: s/p 8 cycles Gemzar/Xeloda  [de-identified] : adenocarcinoma [de-identified] : CA [id number] [id number] [de-identified] : FINAL PATHOLOGY:\par -Invasive adenocarcinoma of the duodenal ampulla (Tumor size: 2.5 x 1.5 x 0.8 cm); Tumor stage: pT3b pN1\par -Vascular and perineural invasion identified\par -1/24 lymph nodes involved by adenocarcinoma\par -Negative margins\par -S/p cholecystectomy ; acute cholecystitis with cholelithiasis \par -Bile duct margin with acute inflammation and focal atypia consistent with low grade dysplasia. \par  [FreeTextEntry1] : Xeloda restarted today at dose reduced 1000 mg BID  [de-identified] : Very tired after the last treatment. Spent most of the week in bed. \par + firm stools despite colace and senna\par Ongoing abdominal discomfort. Pending to see GI. \par Has a f/u with Dr. Bourne in Jan.

## 2019-10-22 NOTE — REASON FOR VISIT
[Follow-Up Visit] : a follow-up [Family Member] : family member [FreeTextEntry2] : Ampullary ca s/p resection on adjuvant therapy

## 2019-10-23 ENCOUNTER — APPOINTMENT (OUTPATIENT)
Dept: GASTROENTEROLOGY | Facility: CLINIC | Age: 54
End: 2019-10-23
Payer: MEDICAID

## 2019-10-23 VITALS
TEMPERATURE: 97.4 F | SYSTOLIC BLOOD PRESSURE: 100 MMHG | BODY MASS INDEX: 21.57 KG/M2 | WEIGHT: 107 LBS | HEIGHT: 59 IN | DIASTOLIC BLOOD PRESSURE: 60 MMHG

## 2019-10-23 PROCEDURE — 99203 OFFICE O/P NEW LOW 30 MIN: CPT

## 2019-11-01 ENCOUNTER — APPOINTMENT (OUTPATIENT)
Age: 54
End: 2019-11-01

## 2019-11-01 ENCOUNTER — LABORATORY RESULT (OUTPATIENT)
Age: 54
End: 2019-11-01

## 2019-11-01 ENCOUNTER — RESULT REVIEW (OUTPATIENT)
Age: 54
End: 2019-11-01

## 2019-11-01 ENCOUNTER — APPOINTMENT (OUTPATIENT)
Dept: HEMATOLOGY ONCOLOGY | Facility: CLINIC | Age: 54
End: 2019-11-01
Payer: MEDICAID

## 2019-11-01 VITALS
SYSTOLIC BLOOD PRESSURE: 108 MMHG | TEMPERATURE: 97.5 F | WEIGHT: 122.14 LBS | BODY MASS INDEX: 24.67 KG/M2 | DIASTOLIC BLOOD PRESSURE: 68 MMHG | OXYGEN SATURATION: 100 % | HEART RATE: 65 BPM | RESPIRATION RATE: 16 BRPM

## 2019-11-01 LAB
BASOPHILS # BLD AUTO: 0 K/UL — SIGNIFICANT CHANGE UP (ref 0–0.2)
BASOPHILS NFR BLD AUTO: 0.7 % — SIGNIFICANT CHANGE UP (ref 0–2)
EOSINOPHIL # BLD AUTO: 0.2 K/UL — SIGNIFICANT CHANGE UP (ref 0–0.5)
EOSINOPHIL NFR BLD AUTO: 2 % — SIGNIFICANT CHANGE UP (ref 0–6)
HCT VFR BLD CALC: 32.5 % — LOW (ref 34.5–45)
HGB BLD-MCNC: 11.3 G/DL — LOW (ref 11.5–15.5)
LYMPHOCYTES # BLD AUTO: 2.6 K/UL — SIGNIFICANT CHANGE UP (ref 1–3.3)
LYMPHOCYTES # BLD AUTO: 34.6 % — SIGNIFICANT CHANGE UP (ref 13–44)
MCHC RBC-ENTMCNC: 29.6 PG — SIGNIFICANT CHANGE UP (ref 27–34)
MCHC RBC-ENTMCNC: 34.7 G/DL — SIGNIFICANT CHANGE UP (ref 32–36)
MCV RBC AUTO: 85.3 FL — SIGNIFICANT CHANGE UP (ref 80–100)
MONOCYTES # BLD AUTO: 0.8 K/UL — SIGNIFICANT CHANGE UP (ref 0–0.9)
MONOCYTES NFR BLD AUTO: 10.3 % — SIGNIFICANT CHANGE UP (ref 2–14)
NEUTROPHILS # BLD AUTO: 3.9 K/UL — SIGNIFICANT CHANGE UP (ref 1.8–7.4)
NEUTROPHILS NFR BLD AUTO: 52.4 % — SIGNIFICANT CHANGE UP (ref 43–77)
PLATELET # BLD AUTO: 107 K/UL — LOW (ref 150–400)
RBC # BLD: 3.81 M/UL — SIGNIFICANT CHANGE UP (ref 3.8–5.2)
RBC # FLD: 14.8 % — HIGH (ref 10.3–14.5)
WBC # BLD: 7.5 K/UL — SIGNIFICANT CHANGE UP (ref 3.8–10.5)
WBC # FLD AUTO: 7.5 K/UL — SIGNIFICANT CHANGE UP (ref 3.8–10.5)

## 2019-11-01 PROCEDURE — 99214 OFFICE O/P EST MOD 30 MIN: CPT

## 2019-11-01 RX ORDER — CAPECITABINE 500 MG/1
500 TABLET ORAL
Qty: 56 | Refills: 2 | Status: DISCONTINUED | COMMUNITY
Start: 2019-04-25 | End: 2019-11-01

## 2019-11-01 RX ORDER — BETAMETHASONE DIPROPIONATE 0.5 MG/G
0.05 CREAM TOPICAL
Qty: 1 | Refills: 0 | Status: DISCONTINUED | COMMUNITY
Start: 2019-07-23 | End: 2019-11-01

## 2019-11-01 RX ORDER — GABAPENTIN 100 MG/1
100 CAPSULE ORAL
Qty: 120 | Refills: 0 | Status: DISCONTINUED | COMMUNITY
Start: 2019-03-14 | End: 2019-11-01

## 2019-11-01 RX ORDER — NYSTATIN 100000 [USP'U]/ML
100000 SUSPENSION ORAL
Qty: 1 | Refills: 0 | Status: DISCONTINUED | COMMUNITY
Start: 2019-06-11 | End: 2019-11-01

## 2019-11-01 RX ORDER — SIMETHICONE 125 MG/1
125 CAPSULE, LIQUID FILLED ORAL 4 TIMES DAILY
Qty: 120 | Refills: 0 | Status: DISCONTINUED | COMMUNITY
Start: 2019-10-23 | End: 2019-11-01

## 2019-11-05 ENCOUNTER — RESULT REVIEW (OUTPATIENT)
Age: 54
End: 2019-11-05

## 2019-11-05 ENCOUNTER — APPOINTMENT (OUTPATIENT)
Age: 54
End: 2019-11-05

## 2019-11-11 ENCOUNTER — APPOINTMENT (OUTPATIENT)
Dept: OPHTHALMOLOGY | Facility: CLINIC | Age: 54
End: 2019-11-11

## 2019-11-12 ENCOUNTER — APPOINTMENT (OUTPATIENT)
Age: 54
End: 2019-11-12

## 2019-12-01 NOTE — PHYSICAL EXAM
[Restricted in physically strenuous activity but ambulatory and able to carry out work of a light or sedentary nature] : Status 1- Restricted in physically strenuous activity but ambulatory and able to carry out work of a light or sedentary nature, e.g., light house work, office work [Normal] : affect appropriate [de-identified] : skin over hands is tight, dry

## 2019-12-01 NOTE — CONSULT LETTER
[Dear  ___] : Dear  [unfilled], [Courtesy Letter:] : I had the pleasure of seeing your patient, [unfilled], in my office today. [Consult Closing:] : Thank you very much for allowing me to participate in the care of this patient.  If you have any questions, please do not hesitate to contact me. [Please see my note below.] : Please see my note below. [Sincerely,] : Sincerely, [FreeTextEntry3] : Katty Navarro D.O. \par Attending Physician \par [facility] Oncology [facility]ology\par  \par [facility] \par Tel: [phone]\par Fax: [phone]\par

## 2019-12-01 NOTE — HISTORY OF PRESENT ILLNESS
[N: ___] : N[unfilled] [T: ___] : T[unfilled] [Disease: _____________________] : Disease: [unfilled] [AJCC Stage: ____] : AJCC Stage: [unfilled] [M: ___] : M[unfilled] [de-identified] : FINAL PATHOLOGY:\par -Invasive adenocarcinoma of the duodenal ampulla (Tumor size: 2.5 x 1.5 x 0.8 cm); Tumor stage: pT3b pN1\par -Vascular and perineural invasion identified\par -1/24 lymph nodes involved by adenocarcinoma\par -Negative margins\par -S/p cholecystectomy ; acute cholecystitis with cholelithiasis \par -Bile duct margin with acute inflammation and focal atypia consistent with low grade dysplasia. \par  [de-identified] : adenocarcinoma [de-identified] : 54 F w/ DM, HTN diagnosed with ampullary carcinoma in December 2018. \par \par Initially was admitted to [facility] on 12/18/18 with dizziness, found to have elevated LFTs. MRCP demonstrated a dilatation of the CBD. ERCP on 12/22/18 performed c/w ampullary mass with obstruction. Pathology c/w adenocarcinoma. Pt was discharged for out pt follow up with surg onc and med onc but was re-admitted on 1/5/19 with n/v, worsening abdominal pain, found to have severe sepsis with E coli bacteremia 2/2 acute cholangitis. \par 1/7: ERCP performed. Stent was removed from the biliary tree. One plastic stent placed\par 1/5 CT A/P Heterogeneity of the right hepatic lobe with questionable 1.3 cm hypoattenuating lesion in segment 7.\par 1/8 MRI Abd: No suspicious liver lesions. No abnormality is identified to correspond with questionable lesion identified on prior CT. 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  Taken to the OR for evacuation of hematoma, cessation of gastroduodenal artery bleeding with two hemostatic stitches, and placement of abthera VAC.  Required pressors in the SICU, then developed fever. \par 1/21-1/22: OR exploratory laparotomy, drainage of abdominal abscess, repair of abdominal wall hernia with Strattice mesh, b/ abdominal wall advancement flaps. Post-op patient remained intubated and off vasopressors. FENA calculated to be pre-renal. 500CC albumin bolus given, urine output improved. 1/22/19 attempted spontaneous breathing trials but pt hypoxic. Pt lactate uptrending, resuscitated w/IVF, and pt hypotensive requiring pressors. UCx growing candida, Diflucan changed to micafungin to r/o resistant organisms in the setting of worsening sepsis. \par 1/23/19, patient noted to have blood oozing from midline incision while hypotensive and on pressors. Peak pressures noted to be elevated on vent and abdomen increasingly distended. All staples removed and a large amount of clot evacuated from midline incision. 1U PRBC given. No active bleeding appreciated. Fascia remains closed. Wound packed with wet to dry dressing. Vitamin K given for elevated INR. \par 1/24-1/29: remained in SICU, started on TPN, diuresed, finally extubated. \par 2/1: septic shock again, pressors restarted\par 2/2-2/11: CT scan with peritonitis, new abscesses. Ab and Antifungals started. Wound with increase in drainage. Octreotide started for increased output \par 2/12: drainage of abscesses by IR.  \par 2/18: Wound was partially closed by plastics with interrupted sutures and an ostomy appliance was placed, VAC removed.\par 3/1/19. d/c home \par \par 4/30-10/21/19: s/p 8 cycles Gemzar/Xeloda \par 10/10/19: CT CAP: no evidence of mets [de-identified] : CA 19-9 34 [FreeTextEntry1] : completed 6 mos of adjuvant Gemzar/Xeloda [de-identified] : Slowly recovering after completion of chemotherapy. Hands still feel tight and sensitive but overall improved. \par No diarrhea. Abdominal discomfort persistent. Has seen GI, PPI recommended. \par Energy level is improving. Gained 2 lbs since last visit.

## 2019-12-17 ENCOUNTER — APPOINTMENT (OUTPATIENT)
Dept: SURGICAL ONCOLOGY | Facility: CLINIC | Age: 54
End: 2019-12-17
Payer: MEDICAID

## 2019-12-17 VITALS
DIASTOLIC BLOOD PRESSURE: 76 MMHG | BODY MASS INDEX: 22.02 KG/M2 | RESPIRATION RATE: 17 BRPM | SYSTOLIC BLOOD PRESSURE: 126 MMHG | OXYGEN SATURATION: 98 % | WEIGHT: 109 LBS | HEART RATE: 70 BPM

## 2019-12-17 PROCEDURE — 99214 OFFICE O/P EST MOD 30 MIN: CPT

## 2019-12-18 ENCOUNTER — APPOINTMENT (OUTPATIENT)
Dept: ORTHOPEDIC SURGERY | Facility: HOSPITAL | Age: 54
End: 2019-12-18

## 2019-12-18 ENCOUNTER — OUTPATIENT (OUTPATIENT)
Dept: OUTPATIENT SERVICES | Facility: HOSPITAL | Age: 54
LOS: 1 days | End: 2019-12-18

## 2019-12-18 VITALS
DIASTOLIC BLOOD PRESSURE: 71 MMHG | WEIGHT: 109 LBS | SYSTOLIC BLOOD PRESSURE: 121 MMHG | HEART RATE: 69 BPM | BODY MASS INDEX: 22.02 KG/M2

## 2019-12-18 VITALS — BODY MASS INDEX: 22.02 KG/M2 | HEIGHT: 59 IN

## 2019-12-18 NOTE — ASSESSMENT
[FreeTextEntry1] : 54 year old woman s/p Whipple procedure for T3N1 (1/24 lymph nodes positive) ampullary adenocarcinoma, complicated by GDA bleed RTOR for oversewing of vessel, abdominal wall bleed requiring opening of the incision and secondary closure with Dermaclose, developed low output ECF.  Well healed incisions.\par \par 12/17/19- She completed 6 months of adjuvant Gemzar/Xeloda under the care of Dr. Navarro.   The patient states she continues to have 9 out of 10 abdominal pain daily with walking and every movement.  She is also experiencing bloating and distention.  She wears an abdominal binder with mild relief.  She was seen by GI who recommended a PPI and Gas-x and the patient states she has minimal relief from both.  Appetite is improving and her weight remains stable.  Reports daily BM's and passing flatus.  Denies fever or chills.  BW 11/2019 -  (29 U/mL); CEA (2.0 ng/mL); LFT's WNL;   CT C/A/P performed on 10/10/19 with stable findings, no evidence of metastatic disease.  Diastasis of the anterior abdominal wall with non obstructed small bowel. \par \par Plan:\par 1) Imaging and BW as per Dr. Katty Navarro \par 2) RTO in 6 months\par 3) Referral to hernia surgeon for hernia repair evaluation

## 2019-12-18 NOTE — PHYSICAL EXAM
[Normal] : supple, no neck mass and thyroid not enlarged [Normal] : oriented to person, place and time, with appropriate affect [de-identified] : soft, NT, distended; diastases recti;  well healed incision; wearing an abdominal binder

## 2019-12-18 NOTE — HISTORY OF PRESENT ILLNESS
[de-identified] : 54 year old female presents for a follow up visit.   \par \par - HOSPITAL COURSE- Hospitalized at [facility] from 1/5/19-3/5/19\par 53 y.o. 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 Patient had acute onset of RUQ pain yesterday, with no inciting factors or relief with aleve. The patient's family also endorsed 1-2 episodes of NBNB emesis yesterday and has had decreased PO intake over the last 36 hours. As of diagnosis of cancer, the patient has not had follow up with oncology or surg-oncology due insurance issues. The patient has an appointment with [facility] on Wednesday for surgical evaluation. In the ED, patient febrile to 101.5, HR 98, BPs decreased to 98/41, found with leukocytosis to 15.2, lactate 4.4. She was found with RUQ pain, fever, and jaundice (Bilirubin 3.9) consistent with acute cholangitis. CT A/P revealed Hyperattenuation and mild wall thickening involving the CBD raises the possibility of cholangitis with pneumobilia related to recent ERCP. In the ED, patient received IVF, vanc/zosyn, morphine, and zofran. Patient admitted with severe sepsis 2/2 acute cholangitis. MICU was consulted for hypotension, but this improved s/p fluids and IV antibiotics and patient did not warrant MICU level care. GI was consulted and recommended ERCP. The patient was maintained on zosyn for IV antibiotics. Patient remained stable on zosyn, white count trended down, lactate improved, and fevers were resolving. BCs ultimately grew E.Coli. 1/7: ERCP performed. Stent was removed from the biliary tree. One plastic stent was placed into the common bile duct. The major papilla appeared to have a mass.Oncology was consulted given patient's poor outpatient oncology follow up due to insurance issues, they recommended CT scan and  MRI abdomen/pelvis with contrast for continued staging of cancer workup. \par \par 1/8/19 CT A/P - showed Small bilateral pleural effusions with bibasilar atelectasis. No lung nodules. Mildly enlarged right cardiophrenic angle lymph node is unchanged since December 18, 2018 is indeterminate\par \par 1/8/19  MRI-  IMPRESSION: *  No suspicious liver lesions. No abnormality is identified to correspond with questionable lesion identified on prior CT. * Intra and extrahepatic biliary ductal dilation, with mural enhancement which may be seen in the setting of cholangitis. *  Cholelithiasis with gallbladder wall thickening.Pt  transferred to surgical oncology service and went to the OR on 1/11/19. \par \par  1/11/19- S/p ex-lap, Whipple pancreaticoduodenectomy for duodenal adenocarcinoma. \par \par FINAL PATHOLOGY:\par -Invasive adenocarcinoma of the duodenal ampulla (Tumor size: 2.5 x 1.5 x 0.8 cm); Tumor stage: pT3b pN1\par -Vascular and perineural invasion identified\par -1/24 lymph nodes involved by adenocarcinoma\par -Negative margins\par -S/p cholecystectomy ; acute cholecystitis with cholelithiasis \par -Bile duct margin with acute inflammation and focal atypia consistent with low grade dysplasia. \par \par Intraop findings: pylorus sparing Whipple.  Pt tolerated procedure well, without complication.\par \par On 1/18/19, patient was a surgical rapid response for which pt transferred to SICU. Patient was febrile to 102. Cultures sent. Antibiotics changed from zosyn to meropenem. Patient was tachypneic and hypotensive to 70s systolic. CBC revealed drop in H/H to 6/19 with elevated lactate of 9. Decision was made to intubate patient. Massive Transfusion protocol initiated. Patient received 4PRBC 5FFP and 1platelet, and bolus 1L of fluid. Surgical team and attending notified when Peak pressures on vent were 50s and increased abdominal distention. Decision was then made to take patient emergently to OR. Patient had evacuation of hematoma, cessation of gastroduodenal artery bleeding with two hemostatic stitches, and placement of abthera VAC. Pt transferred to SICU post op. Patient hypotensive post-op with low UOP. 2 L bolus given.  CBC remained stable. Patient hypotensive to 80s systolic, [place] started and increased, patient was then transitioned to levo. Lactate downtrended to 7.6 and H/H stable. \par \par On 1/19 pt cont to require pressors, resuscitated with IVF, and had drop H/H for which transfused. On 1/21/19, Pt febrile to 100.6 overnight, cultures sent, UA+ nitrite, follow up urine cultures. \par \par Pt RTOR on 1/21/19 for Exploratory laparotomy, drainage of abdominal abscess, repair of abdominal wall hernia with Strattice mesh, b/ abdominal wall advancement flaps. Post-op patient remained intubated and  off vasopressors. FENA calculated to be pre-renal. 500CC albumin bolus given, urine output improved. 1/22/19 attempted spontaneous breathing trials but pt hypoxic. Pt lactate uptrending, resuscitated w/IVF, and pt hypotensive requiring pressors. UCx growing candida, Diflucan changed to micafungin to r/o resistant organisms in the setting of worsening sepsis. On 1/23/19, patient noted to have blood oozing from \par midline incision while hypotensive and on pressors.  Peak pressures noted to be elevated on vent and abdomen increasingly distended.  All staples removed and a large amount of clot evacuated from midline incision. 1U PRBC given. No active bleeding appreciated.  Fascia remains closed.  Wound packed with wet to dry dressing.  Vitamin K given for elevated INR.  1/24/19 nutrition consulted and pt. started on TPN. Plastic consulted bc midline opened for hematoma evacuation yesterday, external tissue expander device (Dermaclose) applied to the wound. Patient hypotensive requiring reinitiation of low dose vasopressors. On 1/25/19, sedatives and pressors were decreased. Pt was volume overloaded and was given Albumin, Lasix, and a Bumex drip. O/N, pt febrile 102, given tylenol. mildly hypotensive SBP 80, started on small dose levo. 1/27 Cont SBT. Decision to continue diuresis with Diamox 1/28 Patient febrile to 101 today. Diuresing with lasix/bumex, giving albumin. Continued SBTs.  On 1/29/19, patient tolerating CPAP. Patient extubated without issue. NGT was removed. Patient started on clears, continue TPN while caloric intake is still low. 1/30/19 CT scan performed and showed b/l moderate pleural effusions with associated atelectasis.  Partially \par imaged right lower lung patchy opacity may represent small infiltrate in the appropriate clinical setting. Moderate abdominopelvic ascites most prominent subjacent to the anterior  abdominal wall.  Left upper and lower quadrant collection measuring greater than fluid attenuation, which may reflect the presence of hemorrhagic products. Mild enhancement is seen in association with \par the anterior abdominal ascites, dependent pelvic ascites, and left mid abdominal collection, consistent with the presence of peritonitis. Findings may be postoperative or related to the presence of hemorrhagic products.Pt started on po labetalol for persistent HTN, however pt with hypotensive episode in PM shortly after administration. Patient given albumin. 2/1/19, persistently febrile, Tmax 101.3, hypotensive, tachycardic, and tachypneac. Albumin and IVF given, BP did not respond. Levophed restarted. JOURDAN X 2 started to drain copious amounts of bilious outpt. JOURDAN #1 700cc and JOURDAN #1 500cc. Given stat dose of vancomycin and \par meropenum.  2/2/19 CT scan performed and showed New diffuse peritoneal enhancement, compatible with peritonitis, more extensive compared to 1/30/2019. Multiple abdominal and pelvic fluid collections consistent with abscesses. New small bowel wall may also edema and colonic wall thickening, likely reactive. Left upper quadrant collection measuring greater than fluid attenuation is grossly unchanged from 1/30/2019 .On 2/3/19 she received 2 u prbc overnight, placed on BIPAP overnight,  vanc \par started overnight, start diflucan, vaso and levo started overnight, ABG and CBC, PM labs, A line placed overnight. Plan for IR drainage tomorrow. On 2/4/19: on pressors overnight, removed in AM. IR today for abscess drainage. advance tiger tube, NGT removed. repeat labs, remove a line after procedure. Increased serobilious wound drainage, BID dressing changes.2/5/19: diflucan to micafungin. decreased seroquil to 25. IV lock. start trickle feeds glucerna. 2/6: no further diuresis at this time, high bilious midline wound output, vac to be placed today by plastics. Sinha out. feeds to goal today.2/7: Central line and sinha removed, discontinued Seroquel, feeds changed to continuous 2/8: Octreotide started for high output from midline wound. Lasix with goal net negative 2L2/9: Resent blood cx and UA, bedside vac change by plastics today.2/11: Patient ordered for CLD w/ TF via NJT, upper and lower portion of abdominal wound closed by plastics, send left JOURDAN for amylase 2/12: CT A&P shows two collections... drainage by IR today. Drain was upsized on the right and left  collection 50cc of pus was drained.2/13: Stable for dispo to floor. 2/15: ID was consulted and recommended Meropenem and to continue Vancomycin for better source control. Patient was also seen by PM&R and they recommended FARIBA if patient is able to participate in care.2/18: Wound was partially closed by plastics with interrupted sutures and an ostomy appliance was placed, VAC removed.2/19 Antibiotics was changed to Meropenem and Zyvox, culture grew VRE.2/20: Patient had a tube check and the LUQ drain was removed. CT showed improvement in abdominal collections. Calorie count was performed. Patient is tolerating a regular diet with GLucerna shakes. Octreotide discontinued on 2/26.2/28: Tube check done and RLQ drain was removed.Patient will be discharged home. Patient will have VNS for wound care and PT.  She will be discharged on 1 more week of antibiotics (Cipro and Flagyl per ID).  Discharged home on 3/1/19. \par \par INTERVAL HISTORY:\par 3/12/19 -  Doing better.  Tolerating PO diet.  + GI fxn.  Pain well controlled.  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/day.\par \par Recent Labs 3/14/19:\par WBC:   9.4 K/uL\par HGB:  9.0 g/dL\par RBC:  3.07 M/uL\par HCT:  25.9%\par PLT:  298 K/uL\par \par Metabolic:\par Na:  141 mmol/L\par K:  3.8 mmol/L\par Cl:  101 mmol/L\par CO2:  27 mmol/L\par Anion Gap:  13 mmol/L\par Glucose:  139 mg/dL\par BUN:  7 mg/dL\par Creat:  0.38 mg/dL\par Total Protein:  7.3 g/dL\par Albumin:  3.4 g/dL\par Serum Ca:  9.3 mg/dL\par Total Bili:  0.8 mg/dL\par AST:  34 U/L\par ALT:  14 U/L\par ALK Phos:  382 U/L\par eGFR:  121 mL/min/1.73M2\par \par CA 19-9:  42 U/mL\par CEA:  2.0 ng/mL\par \par Hepatitis C:  Nonreactive\par Hepatitis B Core Ab:  Nonreactive\par Hepatitis B Surface Ag:  Nonreactive\par \par APTT:  31.8 sec\par PT:  13.2 sec\par INR:  1.16 ratio\par \par 3/19/19: Today, Ms. Lagunas reports occasional nausea, no vomiting, no pain.  Abdominal wall fistula drained last on Saturday nigt 8 cc.  Not drained since.  Daughter states color of drainage has changed from creamy to tan color.  Denies, fevers, chills.   During this visit, sutures removed from midline incision, as well as drain removed (ostomy appliance).  Wound well healing with granulation tissue in the periphery of wound bed with yellow slough noted in bottom of wound bed.  Wound packed with wet to dry dressing.  Instruction and demonstration provided to patient's daughter on changing wet to dry dressing, however, advised she may return to the ostomy appliance if she feels drainage is increasing.  She will return to office to see me in 2 weeks for follow up.  \par \par INTERVAL HISTORY:\par 4/2/19- Daughter continues to change midline wound with wet to dry gauze dressing daily.  The patient is with c/o 7-8 out of 10 abdominal pain with movement and activity, partially relieved with Oxycodone.  She denies fever or chills.  Denies nausea or vomiting. Having daily Bm's and passing flatus.  Appetite continues to improve. The daughter states she is eating an adequate amount of food.  She is scheduled to see Dr. Katty Navarro (H[facility]on[facility]) tomorrow. \par \par 4/21/19 - MRI Abdomen- No evidence of hepatic metastatic disease.\par \par 4/30/19 - Feeling well. Incisions well healed. Scheduled to start chemotherapy under the care of Dr. Katty Navarro.  Denies abdominal pain, nausea, vomiting or changes in bowel habits. \par \par 7/30/19 - She is currently on Xeloda/Gemzar under the care of Dr. Katty Navarro.  Xeloda recently on hold due to Grade 2-3 hand foot syndrome.  She continues to use Betamethasone cream for the hand/foot syndrome.   CT Abdomen performed on 5/22/19 showing stable findings. States her appetite continues to improve and her weight remains stable.  Tolerating PO intake without nausea or vomiting. Reports daily BM's and passing flatus.  Denies fever or chills.  Reports occasional lower left/right abdominal discomfort with movement however has relief with wearing an abdominal binder.  \par \par 12/17/19- She completed 6 months of adjuvant Gemzar/Xeloda under the care of Dr. Navarro.   The patient states she continues to have 9 out of 10 abdominal pain daily with walking and every movement.  She is also experiencing bloating and distention.  She wears an abdominal binder with mild relief.  She was seen by GI who recommended a PPI and Gas-x and the patient states she has minimal relief from both.  Appetite is improving and her weight remains stable.  Reports daily BM's and passing flatus.  Denies fever or chills.  BW 11/2019 -  (29 U/mL); CEA (2.0 ng/mL); LFT's WNL;   CT C/A/P performed on 10/10/19 with stable findings, no evidence of metastatic disease.  Diastasis of the anterior abdominal wall with non obstructed small bowel. \par

## 2019-12-19 ENCOUNTER — OUTPATIENT (OUTPATIENT)
Dept: OUTPATIENT SERVICES | Facility: HOSPITAL | Age: 54
LOS: 1 days | Discharge: HOME | End: 2019-12-19

## 2019-12-19 DIAGNOSIS — C24.1 MALIGNANT NEOPLASM OF AMPULLA OF VATER: ICD-10-CM

## 2019-12-19 DIAGNOSIS — M25.512 PAIN IN LEFT SHOULDER: ICD-10-CM

## 2019-12-27 ENCOUNTER — LABORATORY RESULT (OUTPATIENT)
Age: 54
End: 2019-12-27

## 2019-12-27 ENCOUNTER — RESULT REVIEW (OUTPATIENT)
Age: 54
End: 2019-12-27

## 2019-12-27 ENCOUNTER — APPOINTMENT (OUTPATIENT)
Age: 54
End: 2019-12-27

## 2019-12-27 ENCOUNTER — APPOINTMENT (OUTPATIENT)
Age: 54
End: 2019-12-27
Payer: MEDICAID

## 2019-12-27 VITALS
SYSTOLIC BLOOD PRESSURE: 108 MMHG | HEART RATE: 72 BPM | DIASTOLIC BLOOD PRESSURE: 72 MMHG | WEIGHT: 109.57 LBS | BODY MASS INDEX: 22.13 KG/M2 | RESPIRATION RATE: 16 BRPM | TEMPERATURE: 97.8 F | OXYGEN SATURATION: 98 %

## 2019-12-27 DIAGNOSIS — R21 RASH AND OTHER NONSPECIFIC SKIN ERUPTION: ICD-10-CM

## 2019-12-27 DIAGNOSIS — R09.82 POSTNASAL DRIP: ICD-10-CM

## 2019-12-27 LAB
BASOPHILS # BLD AUTO: 0 K/UL — SIGNIFICANT CHANGE UP (ref 0–0.2)
BASOPHILS NFR BLD AUTO: 0.6 % — SIGNIFICANT CHANGE UP (ref 0–2)
EOSINOPHIL # BLD AUTO: 0.1 K/UL — SIGNIFICANT CHANGE UP (ref 0–0.5)
EOSINOPHIL NFR BLD AUTO: 1.7 % — SIGNIFICANT CHANGE UP (ref 0–6)
HCT VFR BLD CALC: 35.9 % — SIGNIFICANT CHANGE UP (ref 34.5–45)
HGB BLD-MCNC: 12 G/DL — SIGNIFICANT CHANGE UP (ref 11.5–15.5)
LYMPHOCYTES # BLD AUTO: 2.8 K/UL — SIGNIFICANT CHANGE UP (ref 1–3.3)
LYMPHOCYTES # BLD AUTO: 39.3 % — SIGNIFICANT CHANGE UP (ref 13–44)
MCHC RBC-ENTMCNC: 25.7 PG — LOW (ref 27–34)
MCHC RBC-ENTMCNC: 33.3 G/DL — SIGNIFICANT CHANGE UP (ref 32–36)
MCV RBC AUTO: 77.2 FL — LOW (ref 80–100)
MONOCYTES # BLD AUTO: 0.7 K/UL — SIGNIFICANT CHANGE UP (ref 0–0.9)
MONOCYTES NFR BLD AUTO: 10 % — SIGNIFICANT CHANGE UP (ref 2–14)
NEUTROPHILS # BLD AUTO: 3.4 K/UL — SIGNIFICANT CHANGE UP (ref 1.8–7.4)
NEUTROPHILS NFR BLD AUTO: 48.4 % — SIGNIFICANT CHANGE UP (ref 43–77)
PLATELET # BLD AUTO: 121 K/UL — LOW (ref 150–400)
RBC # BLD: 4.65 M/UL — SIGNIFICANT CHANGE UP (ref 3.8–5.2)
RBC # FLD: 13.2 % — SIGNIFICANT CHANGE UP (ref 10.3–14.5)
WBC # BLD: 7.1 K/UL — SIGNIFICANT CHANGE UP (ref 3.8–10.5)
WBC # FLD AUTO: 7.1 K/UL — SIGNIFICANT CHANGE UP (ref 3.8–10.5)

## 2019-12-27 PROCEDURE — 99213 OFFICE O/P EST LOW 20 MIN: CPT

## 2019-12-27 RX ORDER — LANCETS 28 GAUGE
EACH MISCELLANEOUS
Qty: 100 | Refills: 11 | Status: DISCONTINUED | COMMUNITY
Start: 2019-06-17 | End: 2019-12-27

## 2019-12-27 RX ORDER — CLOBETASOL PROPIONATE 0.5 MG/G
0.05 OINTMENT TOPICAL
Qty: 1 | Refills: 0 | Status: DISCONTINUED | COMMUNITY
Start: 2019-11-06 | End: 2019-12-27

## 2019-12-27 RX ORDER — BLOOD SUGAR DIAGNOSTIC
STRIP MISCELLANEOUS TWICE DAILY
Qty: 1 | Refills: 11 | Status: DISCONTINUED | COMMUNITY
Start: 2019-04-17 | End: 2019-12-27

## 2019-12-27 RX ORDER — BLOOD SUGAR DIAGNOSTIC
STRIP MISCELLANEOUS TWICE DAILY
Qty: 1 | Refills: 11 | Status: DISCONTINUED | COMMUNITY
Start: 2019-06-17 | End: 2019-12-27

## 2019-12-27 RX ORDER — ALCOHOL ANTISEPTIC PADS
70 PADS, MEDICATED (EA) TOPICAL
Qty: 100 | Refills: 11 | Status: DISCONTINUED | COMMUNITY
Start: 2019-04-17 | End: 2019-12-27

## 2019-12-27 RX ORDER — LANCETS 28 GAUGE
EACH MISCELLANEOUS
Qty: 100 | Refills: 11 | Status: DISCONTINUED | COMMUNITY
Start: 2019-04-17 | End: 2019-12-27

## 2019-12-27 RX ORDER — DRONABINOL 2.5 MG/1
2.5 CAPSULE ORAL
Qty: 60 | Refills: 0 | Status: DISCONTINUED | COMMUNITY
Start: 2019-07-10 | End: 2019-12-27

## 2020-01-06 ENCOUNTER — APPOINTMENT (OUTPATIENT)
Dept: OPHTHALMOLOGY | Facility: CLINIC | Age: 55
End: 2020-01-06

## 2020-01-09 ENCOUNTER — APPOINTMENT (OUTPATIENT)
Dept: INTERNAL MEDICINE | Facility: CLINIC | Age: 55
End: 2020-01-09
Payer: MEDICAID

## 2020-01-09 VITALS
WEIGHT: 108 LBS | OXYGEN SATURATION: 98 % | BODY MASS INDEX: 21.77 KG/M2 | HEIGHT: 59 IN | DIASTOLIC BLOOD PRESSURE: 70 MMHG | SYSTOLIC BLOOD PRESSURE: 110 MMHG | HEART RATE: 72 BPM | TEMPERATURE: 97.8 F

## 2020-01-09 DIAGNOSIS — Z12.31 ENCOUNTER FOR SCREENING MAMMOGRAM FOR MALIGNANT NEOPLASM OF BREAST: ICD-10-CM

## 2020-01-09 DIAGNOSIS — Z85.09 ENCOUNTER FOR FOLLOW-UP EXAMINATION AFTER COMPLETED TREATMENT FOR MALIGNANT NEOPLASM: ICD-10-CM

## 2020-01-09 DIAGNOSIS — Z87.898 PERSONAL HISTORY OF OTHER SPECIFIED CONDITIONS: ICD-10-CM

## 2020-01-09 DIAGNOSIS — Z23 ENCOUNTER FOR IMMUNIZATION: ICD-10-CM

## 2020-01-09 DIAGNOSIS — L27.1 LOCALIZED SKIN ERUPTION DUE TO DRUGS AND MEDICAMENTS TAKEN INTERNALLY: ICD-10-CM

## 2020-01-09 DIAGNOSIS — J44.9 CHRONIC OBSTRUCTIVE PULMONARY DISEASE, UNSPECIFIED: ICD-10-CM

## 2020-01-09 DIAGNOSIS — Z08 ENCOUNTER FOR FOLLOW-UP EXAMINATION AFTER COMPLETED TREATMENT FOR MALIGNANT NEOPLASM: ICD-10-CM

## 2020-01-09 DIAGNOSIS — R73.02 IMPAIRED GLUCOSE TOLERANCE (ORAL): ICD-10-CM

## 2020-01-09 PROBLEM — R21 RASH: Status: RESOLVED | Noted: 2019-11-06 | Resolved: 2020-01-09

## 2020-01-09 PROCEDURE — 90472 IMMUNIZATION ADMIN EACH ADD: CPT

## 2020-01-09 PROCEDURE — G0008: CPT

## 2020-01-09 PROCEDURE — 90734 MENACWYD/MENACWYCRM VACC IM: CPT

## 2020-01-09 PROCEDURE — 99214 OFFICE O/P EST MOD 30 MIN: CPT | Mod: 25

## 2020-01-09 PROCEDURE — 90682 RIV4 VACC RECOMBINANT DNA IM: CPT

## 2020-01-09 PROCEDURE — 36415 COLL VENOUS BLD VENIPUNCTURE: CPT

## 2020-01-09 RX ORDER — DOCUSATE SODIUM 100 MG
100 TABLET ORAL
Qty: 60 | Refills: 3 | Status: DISCONTINUED | COMMUNITY
Start: 2019-07-23 | End: 2020-01-09

## 2020-01-09 RX ORDER — DOCUSATE SODIUM 100 MG/1
100 CAPSULE ORAL
Refills: 0 | Status: DISCONTINUED | COMMUNITY
End: 2020-01-09

## 2020-01-09 RX ORDER — LORATADINE 10 MG
17 TABLET,DISINTEGRATING ORAL DAILY
Qty: 1 | Refills: 3 | Status: DISCONTINUED | COMMUNITY
Start: 2019-06-18 | End: 2020-01-09

## 2020-01-09 RX ORDER — SENNOSIDES 8.6 MG TABLETS 8.6 MG/1
8.6 TABLET ORAL
Qty: 60 | Refills: 1 | Status: DISCONTINUED | COMMUNITY
Start: 2019-06-18 | End: 2020-01-09

## 2020-01-09 NOTE — HISTORY OF PRESENT ILLNESS
[FreeTextEntry1] : weight loss, cough [de-identified] : pt accompanied by her daughter- Olegario. \par .cough -no chg.  didn't f/u w pulm .  no GERD symptoms. no relief w singular, omeprazole. has itchy eyes but no nasal congestion\par IGT- pt was DM- stopped meds 8/19.  pt not following any diet\par ampullary CA- f/u On[facility]. - CT abd/ chest- stable\par  still has abd pain- no relief w GAS X and prilosec.- as per [facility]- from scarring\par pt going [facility] and returning to [facility]- req meningococcal vaccine\par still has shoulder pain- see ortho- prob impingement, bicept tendonitis.

## 2020-01-09 NOTE — PLAN
[FreeTextEntry1] : flublok and menactra given\par abd pain- wean off PPI and stop simethicone if it is not helping\par shoulder pain- PT\par chronic cough- f/u w pulm

## 2020-01-09 NOTE — PHYSICAL EXAM
[Restricted in physically strenuous activity but ambulatory and able to carry out work of a light or sedentary nature] : Status 1- Restricted in physically strenuous activity but ambulatory and able to carry out work of a light or sedentary nature, e.g., light house work, office work [Normal] : affect appropriate [de-identified] : hernia [de-identified] : llimited ROM right shoulder abducts 60 degrees; tenderness

## 2020-01-09 NOTE — HISTORY OF PRESENT ILLNESS
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 \par \par Initially was admitted to [facility] on 12/18/18 with dizziness, found to have elevated LFTs. MRCP demonstrated a dilatation of the CBD. ERCP on 12/22/18 performed c/w ampullary mass with obstruction. Pathology c/w adenocarcinoma. Pt was discharged for out pt follow up with surg onc and med onc but was re-admitted on 1/5/19 with n/v, worsening abdominal pain, found to have severe sepsis with E coli bacteremia 2/2 acute cholangitis. \par 1/7: ERCP performed. Stent was removed from the biliary tree. One plastic stent placed\par 1/5 CT A/P Heterogeneity of the right hepatic lobe with questionable 1.3 cm hypoattenuating lesion in segment 7.\par 1/8 MRI Abd: No suspicious liver lesions. No abnormality is identified to correspond with questionable lesion identified on prior CT. 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  Taken to the OR for evacuation of hematoma, cessation of gastroduodenal artery bleeding with two hemostatic stitches, and placement of abthera VAC.  Required pressors in the SICU, then developed fever. \par 1/21-1/22: OR exploratory laparotomy, drainage of abdominal abscess, repair of abdominal wall hernia with Strattice mesh, b/ abdominal wall advancement flaps. Post-op patient remained intubated and off vasopressors. FENA calculated to be pre-renal. 500CC albumin bolus given, urine output improved. 1/22/19 attempted spontaneous breathing trials but pt hypoxic. Pt lactate uptrending, resuscitated w/IVF, and pt hypotensive requiring pressors. UCx growing candida, Diflucan changed to micafungin to r/o resistant organisms in the setting of worsening sepsis. \par 1/23/19, patient noted to have blood oozing from midline incision while hypotensive and on pressors. Peak pressures noted to be elevated on vent and abdomen increasingly distended. All staples removed and a large amount of clot evacuated from midline incision. 1U PRBC given. No active bleeding appreciated. Fascia remains closed. Wound packed with wet to dry dressing. Vitamin K given for elevated INR. \par 1/24-1/29: remained in SICU, started on TPN, diuresed, finally extubated. \par 2/1: septic shock again, pressors restarted\par 2/2-2/11: CT scan with peritonitis, new abscesses. Ab and Antifungals started. Wound with increase in drainage. Octreotide started for increased output \par 2/12: drainage of abscesses by IR.  \par 2/18: Wound was partially closed by plastics with interrupted sutures and an ostomy appliance was placed, VAC removed.\par 3/1/19. d/c home \par \par 4/30-10/21/19: s/p 8 cycles Gemzar/Xeloda \par 10/10/19: CT CAP: no evidence of mets\par \par  [de-identified] : adenocarcinoma [de-identified] : CA [id number] [de-identified] : FINAL PATHOLOGY:\par -Invasive adenocarcinoma of the duodenal ampulla (Tumor size: 2.5 x 1.5 x 0.8 cm); Tumor stage: pT3b pN1\par -Vascular and perineural invasion identified\par -1/24 lymph nodes involved by adenocarcinoma\par -Negative margins\par -S/p cholecystectomy ; acute cholecystitis with cholelithiasis \par -Bile duct margin with acute inflammation and focal atypia consistent with low grade dysplasia. \par  [FreeTextEntry1] : completed 6 mos of adjuvant Gemzar/Xeloda [de-identified] : most bothered by LUE pain radiating down arm seen by  neurology and ortho. PT eval initiated  Trying to find a facility that will accept insurance. \par still has gas and belching with ppi and gas x some improvement \par referred to hernia surgeon by Dr Santacruz but [place] of state\par diet at baseline\par stools are normal-constipated\par still having myalgias in legs since chemo\par leaving next month to [facility] for daughter's wedding until april \par \par

## 2020-01-09 NOTE — REASON FOR VISIT
[Follow-Up Visit] : a follow-up [Family Member] : family member [FreeTextEntry2] : Ampullary ca s/p resection and adjuvant treatment

## 2020-01-09 NOTE — CONSULT LETTER
[Dear  ___] : Dear  [unfilled], [Courtesy Letter:] : I had the pleasure of seeing your patient, [unfilled], in my office today. [Please see my note below.] : Please see my note below. [Consult Closing:] : Thank you very much for allowing me to participate in the care of this patient.  If you have any questions, please do not hesitate to contact me. [Sincerely,] : Sincerely, [FreeTextEntry3] : Katty Navarro D.O. \par Attending Physician \par [facility] of Medical Oncology [facility] Hematology\par  \par [facility] \par Tel: [phone]\par Fax: [phone]\par

## 2020-01-09 NOTE — PHYSICAL EXAM
[No Acute Distress] : no acute distress [Well Nourished] : well nourished [Well Developed] : well developed [Well-Appearing] : well-appearing [Normal Sclera/Conjunctiva] : normal sclera/conjunctiva [PERRL] : pupils equal round and reactive to light [Normal Outer Ear/Nose] : the outer ears and nose were normal in appearance [Normal Oropharynx] : the oropharynx was normal [Normal TMs] : both tympanic membranes were normal [Supple] : supple [No Lymphadenopathy] : no lymphadenopathy [Thyroid Normal, No Nodules] : the thyroid was normal and there were no nodules present [No Respiratory Distress] : no respiratory distress  [Clear to Auscultation] : lungs were clear to auscultation bilaterally [No Accessory Muscle Use] : no accessory muscle use [Normal Rate] : normal rate  [Regular Rhythm] : with a regular rhythm [Normal S1, S2] : normal S1 and S2 [No Murmur] : no murmur heard [No Carotid Bruits] : no carotid bruits [No Edema] : there was no peripheral edema [Soft] : abdomen soft [Non Tender] : non-tender [Non-distended] : non-distended [No Masses] : no abdominal mass palpated [Normal Bowel Sounds] : normal bowel sounds [Normal Supraclavicular Nodes] : no supraclavicular lymphadenopathy [Normal Axillary Nodes] : no axillary lymphadenopathy [Normal Posterior Cervical Nodes] : no posterior cervical lymphadenopathy [Normal Anterior Cervical Nodes] : no anterior cervical lymphadenopathy [Normal Inguinal Nodes] : no inguinal lymphadenopathy [Grossly Normal Strength/Tone] : grossly normal strength/tone [Coordination Grossly Intact] : coordination grossly intact [No Focal Deficits] : no focal deficits [Deep Tendon Reflexes (DTR)] : deep tendon reflexes were 2+ and symmetric [Normal Affect] : the affect was normal [Normal Insight/Judgement] : insight and judgment were intact [None] : no ulcers in either foot were found [] : normal Bi-Rhombic Flap Text: The defect edges were debeveled with a #15 scalpel blade.  Given the location of the defect and the proximity to free margins a bi-rhombic flap was deemed most appropriate.  Using a sterile surgical marker, an appropriate rhombic flap was drawn incorporating the defect. The area thus outlined was incised deep to adipose tissue with a #15 scalpel blade.  The skin margins were undermined to an appropriate distance in all directions utilizing iris scissors.

## 2020-01-10 DIAGNOSIS — E11.9 TYPE 2 DIABETES MELLITUS W/OUT COMPLICATIONS: ICD-10-CM

## 2020-01-10 LAB
ESTIMATED AVERAGE GLUCOSE: 169 MG/DL
HBA1C MFR BLD HPLC: 7.5 %

## 2020-01-19 NOTE — PROGRESS NOTE ADULT - SUBJECTIVE AND OBJECTIVE BOX
Med Rec Updated and Complete per Pt's family at bedside  Allergies Reviewed  No PO ABX last 14 days.    Family denies OC medication at this time.       GENERAL SURGERY DAILY PROGRESS NOTE:     Subjective:  Pt seen and examined. No acute events overnight. Tolerating diet. Denies n/v. Would like to work on stairs with pt today.     Objective:  General: Appears well, NAD  Chest: Equal expansion bilaterally  Abdomen soft, nontender, nondistended, incision C/D/I with ostomy appliance around midline wound w/ minimal output some leaking at top of appliance, R - IR w/ milky output; L -IR  w/ SS output, Gurinder w/ serous/milky output.   Extremities: Grossly symmetric, SCD's in place     MEDICATIONS  (STANDING):  artificial  tears Solution 1 Drop(s) Both EYES every 12 hours  atorvastatin 10 milliGRAM(s) Oral at bedtime  dextrose 5%. 1000 milliLiter(s) (50 mL/Hr) IV Continuous <Continuous>  dextrose 50% Injectable 12.5 Gram(s) IV Push once  dextrose 50% Injectable 25 Gram(s) IV Push once  dextrose 50% Injectable 25 Gram(s) IV Push once  enoxaparin Injectable 40 milliGRAM(s) SubCutaneous daily  gabapentin 100 milliGRAM(s) Oral three times a day  insulin lispro (HumaLOG) corrective regimen sliding scale   SubCutaneous three times a day before meals  insulin lispro (HumaLOG) corrective regimen sliding scale   SubCutaneous at bedtime  lidocaine 1% Injectable 30 milliLiter(s) Local Injection once  linezolid  IVPB 600 milliGRAM(s) IV Intermittent every 12 hours  meropenem  IVPB 1000 milliGRAM(s) IV Intermittent every 8 hours  montelukast 10 milliGRAM(s) Oral daily  octreotide  Injectable 100 MICROGram(s) SubCutaneous every 8 hours  pantoprazole    Tablet 40 milliGRAM(s) Oral before breakfast    MEDICATIONS  (PRN):  acetaminophen   Tablet .. 650 milliGRAM(s) Oral every 6 hours PRN Mild Pain (1 - 3)  dextrose 40% Gel 15 Gram(s) Oral once PRN Blood Glucose LESS THAN 70 milliGRAM(s)/deciliter  glucagon  Injectable 1 milliGRAM(s) IntraMuscular once PRN Glucose LESS THAN 70 milligrams/deciliter  HYDROmorphone   Tablet 2 milliGRAM(s) Oral every 4 hours PRN Moderate Pain (4 - 6)      Vital Signs Last 24 Hrs  T(C): 37.1 (25 Feb 2019 11:30), Max: 37.9 (25 Feb 2019 05:10)  T(F): 98.8 (25 Feb 2019 11:30), Max: 100.2 (25 Feb 2019 05:10)  HR: 92 (25 Feb 2019 11:30) (90 - 95)  BP: 105/62 (25 Feb 2019 11:30) (104/53 - 115/55)  BP(mean): --  RR: 18 (25 Feb 2019 11:30) (18 - 18)  SpO2: 98% (25 Feb 2019 11:30) (96% - 98%)    I&O's Detail    24 Feb 2019 07:01  -  25 Feb 2019 07:00  --------------------------------------------------------  IN:  Total IN: 0 mL    OUT:    Bulb: 7.5 mL    Bulb: 15 mL    Drain: 75 mL    Drain: 35 mL    Voided: 1000 mL  Total OUT: 1132.5 mL    Total NET: -1132.5 mL          Daily     Daily     LABS:                        7.8    12.92 )-----------( 269      ( 25 Feb 2019 04:55 )             25.3     02-25    132<L>  |  95<L>  |  5<L>  ----------------------------<  158<H>  4.6   |  27  |  0.47<L>    Ca    8.2<L>      25 Feb 2019 04:55  Phos  2.1     02-25  Mg     1.9     02-25            RADIOLOGY & ADDITIONAL STUDIES:

## 2020-04-28 ENCOUNTER — APPOINTMENT (OUTPATIENT)
Dept: INTERNAL MEDICINE | Facility: CLINIC | Age: 55
End: 2020-04-28

## 2020-05-01 ENCOUNTER — TRANSCRIPTION ENCOUNTER (OUTPATIENT)
Age: 55
End: 2020-05-01

## 2020-05-15 ENCOUNTER — APPOINTMENT (OUTPATIENT)
Age: 55
End: 2020-05-15

## 2020-06-22 ENCOUNTER — APPOINTMENT (OUTPATIENT)
Dept: INTERNAL MEDICINE | Facility: CLINIC | Age: 55
End: 2020-06-22

## 2020-06-23 ENCOUNTER — APPOINTMENT (OUTPATIENT)
Dept: SURGICAL ONCOLOGY | Facility: CLINIC | Age: 55
End: 2020-06-23

## 2020-07-17 NOTE — AIRWAY PLACEMENT NOTE ADULT - DISPOSITION AFTER INTUBATION:
Ice pack to the scalp for 24 hr.  Tylenol every 4 hr if needed for any pain.  Staple removal in 7-10 days.  Watch scalp for any signs of infection.  Keep the affected area dry and clean.  Return to the ER as needed  
patient placed on mechanical ventilation
patient placed on mechanical ventilation

## 2020-08-04 NOTE — PROCEDURE NOTE - NSTOLERANCE_GEN_A_CORE
bones(Osteoporosis,prev fx,steroid use,metastatic bone ca)
Patient tolerated procedure well.

## 2020-10-26 ENCOUNTER — OUTPATIENT (OUTPATIENT)
Dept: OUTPATIENT SERVICES | Facility: HOSPITAL | Age: 55
LOS: 1 days | Discharge: HOME | End: 2020-10-26

## 2020-10-26 DIAGNOSIS — C24.1 MALIGNANT NEOPLASM OF AMPULLA OF VATER: ICD-10-CM

## 2020-11-02 ENCOUNTER — APPOINTMENT (OUTPATIENT)
Age: 55
End: 2020-11-02
Payer: MEDICAID

## 2020-11-02 ENCOUNTER — RESULT REVIEW (OUTPATIENT)
Age: 55
End: 2020-11-02

## 2020-11-02 ENCOUNTER — APPOINTMENT (OUTPATIENT)
Age: 55
End: 2020-11-02

## 2020-11-02 ENCOUNTER — LABORATORY RESULT (OUTPATIENT)
Age: 55
End: 2020-11-02

## 2020-11-02 VITALS
BODY MASS INDEX: 20.93 KG/M2 | WEIGHT: 103.62 LBS | DIASTOLIC BLOOD PRESSURE: 73 MMHG | HEART RATE: 64 BPM | TEMPERATURE: 98 F | OXYGEN SATURATION: 100 % | SYSTOLIC BLOOD PRESSURE: 118 MMHG | RESPIRATION RATE: 14 BRPM

## 2020-11-02 LAB
BASOPHILS # BLD AUTO: 0.07 K/UL — SIGNIFICANT CHANGE UP (ref 0–0.2)
BASOPHILS NFR BLD AUTO: 1 % — SIGNIFICANT CHANGE UP (ref 0–2)
EOSINOPHIL # BLD AUTO: 0.11 K/UL — SIGNIFICANT CHANGE UP (ref 0–0.5)
EOSINOPHIL NFR BLD AUTO: 1.6 % — SIGNIFICANT CHANGE UP (ref 0–6)
HCT VFR BLD CALC: 34.8 % — SIGNIFICANT CHANGE UP (ref 34.5–45)
HGB BLD-MCNC: 11.2 G/DL — LOW (ref 11.5–15.5)
IMM GRANULOCYTES NFR BLD AUTO: 0.3 % — SIGNIFICANT CHANGE UP (ref 0–1.5)
LYMPHOCYTES # BLD AUTO: 2.66 K/UL — SIGNIFICANT CHANGE UP (ref 1–3.3)
LYMPHOCYTES # BLD AUTO: 37.6 % — SIGNIFICANT CHANGE UP (ref 13–44)
MCHC RBC-ENTMCNC: 24.3 PG — LOW (ref 27–34)
MCHC RBC-ENTMCNC: 32.2 G/DL — SIGNIFICANT CHANGE UP (ref 32–36)
MCV RBC AUTO: 75.7 FL — LOW (ref 80–100)
MONOCYTES # BLD AUTO: 0.65 K/UL — SIGNIFICANT CHANGE UP (ref 0–0.9)
MONOCYTES NFR BLD AUTO: 9.2 % — SIGNIFICANT CHANGE UP (ref 2–14)
NEUTROPHILS # BLD AUTO: 3.56 K/UL — SIGNIFICANT CHANGE UP (ref 1.8–7.4)
NEUTROPHILS NFR BLD AUTO: 50.3 % — SIGNIFICANT CHANGE UP (ref 43–77)
NRBC # BLD: 0 /100 WBCS — SIGNIFICANT CHANGE UP (ref 0–0)
PLATELET # BLD AUTO: 132 K/UL — LOW (ref 150–400)
RBC # BLD: 4.6 M/UL — SIGNIFICANT CHANGE UP (ref 3.8–5.2)
RBC # FLD: 14.3 % — SIGNIFICANT CHANGE UP (ref 10.3–14.5)
WBC # BLD: 7.07 K/UL — SIGNIFICANT CHANGE UP (ref 3.8–10.5)
WBC # FLD AUTO: 7.07 K/UL — SIGNIFICANT CHANGE UP (ref 3.8–10.5)

## 2020-11-02 PROCEDURE — 99213 OFFICE O/P EST LOW 20 MIN: CPT

## 2020-11-06 ENCOUNTER — APPOINTMENT (OUTPATIENT)
Dept: CT IMAGING | Facility: IMAGING CENTER | Age: 55
End: 2020-11-06
Payer: MEDICAID

## 2020-11-06 ENCOUNTER — OUTPATIENT (OUTPATIENT)
Dept: OUTPATIENT SERVICES | Facility: HOSPITAL | Age: 55
LOS: 1 days | End: 2020-11-06
Payer: MEDICAID

## 2020-11-06 DIAGNOSIS — Z00.8 ENCOUNTER FOR OTHER GENERAL EXAMINATION: ICD-10-CM

## 2020-11-06 DIAGNOSIS — C24.1 MALIGNANT NEOPLASM OF AMPULLA OF VATER: ICD-10-CM

## 2020-11-06 PROCEDURE — 74177 CT ABD & PELVIS W/CONTRAST: CPT

## 2020-11-06 PROCEDURE — 74177 CT ABD & PELVIS W/CONTRAST: CPT | Mod: 26

## 2020-11-06 PROCEDURE — 71260 CT THORAX DX C+: CPT

## 2020-11-06 PROCEDURE — 71260 CT THORAX DX C+: CPT | Mod: 26

## 2020-11-29 NOTE — HISTORY OF PRESENT ILLNESS
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 \par \par Initially was admitted to [facility] on 12/18/18 with dizziness, found to have elevated LFTs. MRCP demonstrated a dilatation of the CBD. ERCP on 12/22/18 performed c/w ampullary mass with obstruction. Pathology c/w adenocarcinoma. Pt was discharged for out pt follow up with surg onc and med onc but was re-admitted on 1/5/19 with n/v, worsening abdominal pain, found to have severe sepsis with E coli bacteremia 2/2 acute cholangitis. \par 1/7: ERCP performed. Stent was removed from the biliary tree. One plastic stent placed\par 1/5 CT A/P Heterogeneity of the right hepatic lobe with questionable 1.3 cm hypoattenuating lesion in segment 7.\par 1/8 MRI Abd: No suspicious liver lesions. No abnormality is identified to correspond with questionable lesion identified on prior CT. 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  Taken to the OR for evacuation of hematoma, cessation of gastroduodenal artery bleeding with two hemostatic stitches, and placement of abthera VAC.  Required pressors in the SICU, then developed fever. \par 1/21-1/22: OR exploratory laparotomy, drainage of abdominal abscess, repair of abdominal wall hernia with Strattice mesh, b/ abdominal wall advancement flaps. Post-op patient remained intubated and off vasopressors. FENA calculated to be pre-renal. 500CC albumin bolus given, urine output improved. 1/22/19 attempted spontaneous breathing trials but pt hypoxic. Pt lactate uptrending, resuscitated w/IVF, and pt hypotensive requiring pressors. UCx growing candida, Diflucan changed to micafungin to r/o resistant organisms in the setting of worsening sepsis. \par 1/23/19, patient noted to have blood oozing from midline incision while hypotensive and on pressors. Peak pressures noted to be elevated on vent and abdomen increasingly distended. All staples removed and a large amount of clot evacuated from midline incision. 1U PRBC given. No active bleeding appreciated. Fascia remains closed. Wound packed with wet to dry dressing. Vitamin K given for elevated INR. \par 1/24-1/29: remained in SICU, started on TPN, diuresed, finally extubated. \par 2/1: septic shock again, pressors restarted\par 2/2-2/11: CT scan with peritonitis, new abscesses. Ab and Antifungals started. Wound with increase in drainage. Octreotide started for increased output \par 2/12: drainage of abscesses by IR.  \par 2/18: Wound was partially closed by plastics with interrupted sutures and an ostomy appliance was placed, VAC removed.\par 3/1/19. d/c home \par \par 4/30-10/21/19: s/p 8 cycles Gemzar/Xeloda \par 10/10/19: CT CAP: no evidence of mets\par 1/20-11/2020: prolonged stay in [facility] due to COVID \par  [de-identified] : adenocarcinoma [de-identified] : CA [id number] [de-identified] : FINAL PATHOLOGY:\par -Invasive adenocarcinoma of the duodenal ampulla (Tumor size: 2.5 x 1.5 x 0.8 cm); Tumor stage: pT3b pN1\par -Vascular and perineural invasion identified\par -1/24 lymph nodes involved by adenocarcinoma\par -Negative margins\par -S/p cholecystectomy ; acute cholecystitis with cholelithiasis \par -Bile duct margin with acute inflammation and focal atypia consistent with low grade dysplasia. \par  [FreeTextEntry1] : surveillance  [de-identified] : + hard stool, daily\par + abdominal discomfort persists, also feels like there is movement inside. \par no vomiting, no nausea. \par + fatigue worsened but remains active and performs all ADLs

## 2020-11-29 NOTE — REASON FOR VISIT
[Follow-Up Visit] : a follow-up [Family Member] : family member [FreeTextEntry2] : h/o ampullary ca s/p surgery and adjuvant chemo

## 2020-12-08 ENCOUNTER — APPOINTMENT (OUTPATIENT)
Dept: SURGICAL ONCOLOGY | Facility: CLINIC | Age: 55
End: 2020-12-08
Payer: MEDICAID

## 2020-12-08 VITALS
SYSTOLIC BLOOD PRESSURE: 110 MMHG | WEIGHT: 103 LBS | HEART RATE: 59 BPM | RESPIRATION RATE: 14 BRPM | HEIGHT: 59 IN | OXYGEN SATURATION: 99 % | BODY MASS INDEX: 20.76 KG/M2 | DIASTOLIC BLOOD PRESSURE: 69 MMHG

## 2020-12-08 PROCEDURE — 99214 OFFICE O/P EST MOD 30 MIN: CPT

## 2020-12-08 NOTE — ASSESSMENT
[FreeTextEntry1] : 55 year old woman s/p Whipple procedure for T3N1 (1/24 lymph nodes positive) ampullary adenocarcinoma, complicated by GDA bleed RTOR for oversewing of vessel, abdominal wall bleed requiring opening of the incision and secondary closure with Dermaclose, developed low output ECF.  Well healed incisions.\par \par 12/17/19- She completed 6 months of adjuvant Gemzar/Xeloda under the care of Dr. Navarro.   The patient states she continues to have 9 out of 10 abdominal pain daily with walking and every movement.  She is also experiencing bloating and distention.  She wears an abdominal binder with mild relief.  She was seen by GI who recommended a PPI and Gas-x and the patient states she has minimal relief from both.  Appetite is improving and her weight remains stable.  Reports daily BM's and passing flatus.  Denies fever or chills.  BW 11/2019 -  (29 U/mL); CEA (2.0 ng/mL); LFT's WNL;   CT C/A/P performed on 10/10/19 with stable findings, no evidence of metastatic disease.  Diastasis of the anterior abdominal wall with non obstructed small bowel. \par \par 12/8/2020- Continues f/u with Dr. Katty Navarro and was last seen 11/2/2020.  Patient underwent a f/u CT C/A/P on 11/6/2020 showing an unchanged right cardiophrenic lymph node and 2 small indeterminate lymph nodes at the root of the mesentery which are slightly increased in size.  F/u is recommended to assess for change.   Blood work 11/2/2020 (CEA- 1.9 ng/mL;  - 39 U/mL).    Reports ongoing abdominal pain/discomfort with activity and movement.  She has not seen a hernia specialist.  States her appetite isn't as great since surgery.  Weight remains stable. Denies nausea, vomiting.  Denies changes in bowel habits.  No fever or chills. \par \par Plan:\par 1) Imaging and BW as per Dr. Katty Navarro \par 2) Referral to hernia surgeon for hernia repair evaluation\par 3) RTO in 6 months

## 2020-12-08 NOTE — HISTORY OF PRESENT ILLNESS
[de-identified] : 55 year old female presents for a follow up visit.   \par \par - HOSPITAL COURSE- Hospitalized at [facility] from 1/5/19-3/5/19\par 53 y.o. 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 Patient had acute onset of RUQ pain yesterday, with no inciting factors or relief with aleve. The patient's family also endorsed 1-2 episodes of NBNB emesis yesterday and has had decreased PO intake over the last 36 hours. As of diagnosis of cancer, the patient has not had follow up with oncology or surg-oncology due insurance issues. The patient has an appointment with [facility] on Wednesday for surgical evaluation. In the ED, patient febrile to 101.5, HR 98, BPs decreased to 98/41, found with leukocytosis to 15.2, lactate 4.4. She was found with RUQ pain, fever, and jaundice (Bilirubin 3.9) consistent with acute cholangitis. CT A/P revealed Hyperattenuation and mild wall thickening involving the CBD raises the possibility of cholangitis with pneumobilia related to recent ERCP. In the ED, patient received IVF, vanc/zosyn, morphine, and zofran. Patient admitted with severe sepsis 2/2 acute cholangitis. MICU was consulted for hypotension, but this improved s/p fluids and IV antibiotics and patient did not warrant MICU level care. GI was consulted and recommended ERCP. The patient was maintained on zosyn for IV antibiotics. Patient remained stable on zosyn, white count trended down, lactate improved, and fevers were resolving. BCs ultimately grew E.Coli. 1/7: ERCP performed. Stent was removed from the biliary tree. One plastic stent was placed into the common bile duct. The major papilla appeared to have a mass.Oncology was consulted given patient's poor outpatient oncology follow up due to insurance issues, they recommended CT scan and  MRI abdomen/pelvis with contrast for continued staging of cancer workup. \par \par 1/8/19 CT A/P - showed Small bilateral pleural effusions with bibasilar atelectasis. No lung nodules. Mildly enlarged right cardiophrenic angle lymph node is unchanged since December 18, 2018 is indeterminate\par \par 1/8/19  MRI-  IMPRESSION: *  No suspicious liver lesions. No abnormality is identified to correspond with questionable lesion identified on prior CT. * Intra and extrahepatic biliary ductal dilation, with mural enhancement which may be seen in the setting of cholangitis. *  Cholelithiasis with gallbladder wall thickening.Pt  transferred to surgical oncology service and went to the OR on 1/11/19. \par \par  1/11/19- S/p ex-lap, Whipple pancreaticoduodenectomy for duodenal adenocarcinoma. \par \par FINAL PATHOLOGY:\par -Invasive adenocarcinoma of the duodenal ampulla (Tumor size: 2.5 x 1.5 x 0.8 cm); Tumor stage: pT3b pN1\par -Vascular and perineural invasion identified\par -1/24 lymph nodes involved by adenocarcinoma\par -Negative margins\par -S/p cholecystectomy ; acute cholecystitis with cholelithiasis \par -Bile duct margin with acute inflammation and focal atypia consistent with low grade dysplasia. \par \par Intraop findings: pylorus sparing Whipple.  Pt tolerated procedure well, without complication.\par \par On 1/18/19, patient was a surgical rapid response for which pt transferred to SICU. Patient was febrile to 102. Cultures sent. Antibiotics changed from zosyn to meropenem. Patient was tachypneic and hypotensive to 70s systolic. CBC revealed drop in H/H to 6/19 with elevated lactate of 9. Decision was made to intubate patient. Massive Transfusion protocol initiated. Patient received 4PRBC 5FFP and 1platelet, and bolus 1L of fluid. Surgical team and attending notified when Peak pressures on vent were 50s and increased abdominal distention. Decision was then made to take patient emergently to OR. Patient had evacuation of hematoma, cessation of gastroduodenal artery bleeding with two hemostatic stitches, and placement of abthera VAC. Pt transferred to SICU post op. Patient hypotensive post-op with low UOP. 2 L bolus given.  CBC remained stable. Patient hypotensive to 80s systolic, [place] started and increased, patient was then transitioned to levo. Lactate downtrended to 7.6 and H/H stable. \par \par On 1/19 pt cont to require pressors, resuscitated with IVF, and had drop H/H for which transfused. On 1/21/19, Pt febrile to 100.6 overnight, cultures sent, UA+ nitrite, follow up urine cultures. \par \par Pt RTOR on 1/21/19 for Exploratory laparotomy, drainage of abdominal abscess, repair of abdominal wall hernia with Strattice mesh, b/ abdominal wall advancement flaps. Post-op patient remained intubated and  off vasopressors. FENA calculated to be pre-renal. 500CC albumin bolus given, urine output improved. 1/22/19 attempted spontaneous breathing trials but pt hypoxic. Pt lactate uptrending, resuscitated w/IVF, and pt hypotensive requiring pressors. UCx growing candida, Diflucan changed to micafungin to r/o resistant organisms in the setting of worsening sepsis. On 1/23/19, patient noted to have blood oozing from \par midline incision while hypotensive and on pressors.  Peak pressures noted to be elevated on vent and abdomen increasingly distended.  All staples removed and a large amount of clot evacuated from midline incision. 1U PRBC given. No active bleeding appreciated.  Fascia remains closed.  Wound packed with wet to dry dressing.  Vitamin K given for elevated INR.  1/24/19 nutrition consulted and pt. started on TPN. Plastic consulted bc midline opened for hematoma evacuation yesterday, external tissue expander device (Dermaclose) applied to the wound. Patient hypotensive requiring reinitiation of low dose vasopressors. On 1/25/19, sedatives and pressors were decreased. Pt was volume overloaded and was given Albumin, Lasix, and a Bumex drip. O/N, pt febrile 102, given tylenol. mildly hypotensive SBP 80, started on small dose levo. 1/27 Cont SBT. Decision to continue diuresis with Diamox 1/28 Patient febrile to 101 today. Diuresing with lasix/bumex, giving albumin. Continued SBTs.  On 1/29/19, patient tolerating CPAP. Patient extubated without issue. NGT was removed. Patient started on clears, continue TPN while caloric intake is still low. 1/30/19 CT scan performed and showed b/l moderate pleural effusions with associated atelectasis.  Partially \par imaged right lower lung patchy opacity may represent small infiltrate in the appropriate clinical setting. Moderate abdominopelvic ascites most prominent subjacent to the anterior  abdominal wall.  Left upper and lower quadrant collection measuring greater than fluid attenuation, which may reflect the presence of hemorrhagic products. Mild enhancement is seen in association with \par the anterior abdominal ascites, dependent pelvic ascites, and left mid abdominal collection, consistent with the presence of peritonitis. Findings may be postoperative or related to the presence of hemorrhagic products.Pt started on po labetalol for persistent HTN, however pt with hypotensive episode in PM shortly after administration. Patient given albumin. 2/1/19, persistently febrile, Tmax 101.3, hypotensive, tachycardic, and tachypneac. Albumin and IVF given, BP did not respond. Levophed restarted. JOURDAN X 2 started to drain copious amounts of bilious outpt. JOURDAN #1 700cc and JOURDAN #1 500cc. Given stat dose of vancomycin and \par meropenum.  2/2/19 CT scan performed and showed New diffuse peritoneal enhancement, compatible with peritonitis, more extensive compared to 1/30/2019. Multiple abdominal and pelvic fluid collections consistent with abscesses. New small bowel wall may also edema and colonic wall thickening, likely reactive. Left upper quadrant collection measuring greater than fluid attenuation is grossly unchanged from 1/30/2019 .On 2/3/19 she received 2 u prbc overnight, placed on BIPAP overnight,  vanc \par started overnight, start diflucan, vaso and levo started overnight, ABG and CBC, PM labs, A line placed overnight. Plan for IR drainage tomorrow. On 2/4/19: on pressors overnight, removed in AM. IR today for abscess drainage. advance tiger tube, NGT removed. repeat labs, remove a line after procedure. Increased serobilious wound drainage, BID dressing changes.2/5/19: diflucan to micafungin. decreased seroquil to 25. IV lock. start trickle feeds glucerna. 2/6: no further diuresis at this time, high bilious midline wound output, vac to be placed today by plastics. Sinha out. feeds to goal today.2/7: Central line and sinha removed, discontinued Seroquel, feeds changed to continuous 2/8: Octreotide started for high output from midline wound. Lasix with goal net negative 2L2/9: Resent blood cx and UA, bedside vac change by plastics today.2/11: Patient ordered for CLD w/ TF via NJT, upper and lower portion of abdominal wound closed by plastics, send left JOURDAN for amylase 2/12: CT A&P shows two collections... drainage by IR today. Drain was upsized on the right and left  collection 50cc of pus was drained.2/13: Stable for dispo to floor. 2/15: ID was consulted and recommended Meropenem and to continue Vancomycin for better source control. Patient was also seen by PM&R and they recommended FARIBA if patient is able to participate in care.2/18: Wound was partially closed by plastics with interrupted sutures and an ostomy appliance was placed, VAC removed.2/19 Antibiotics was changed to Meropenem and Zyvox, culture grew VRE.2/20: Patient had a tube check and the LUQ drain was removed. CT showed improvement in abdominal collections. Calorie count was performed. Patient is tolerating a regular diet with GLucerna shakes. Octreotide discontinued on 2/26.2/28: Tube check done and RLQ drain was removed.Patient will be discharged home. Patient will have VNS for wound care and PT.  She will be discharged on 1 more week of antibiotics (Cipro and Flagyl per ID).  Discharged home on 3/1/19. \par \par INTERVAL HISTORY:\par 3/12/19 -  Doing better.  Tolerating PO diet.  + GI fxn.  Pain well controlled.  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/day.\par \par Recent Labs 3/14/19:\par WBC:   9.4 K/uL\par HGB:  9.0 g/dL\par RBC:  3.07 M/uL\par HCT:  25.9%\par PLT:  298 K/uL\par \par Metabolic:\par Na:  141 mmol/L\par K:  3.8 mmol/L\par Cl:  101 mmol/L\par CO2:  27 mmol/L\par Anion Gap:  13 mmol/L\par Glucose:  139 mg/dL\par BUN:  7 mg/dL\par Creat:  0.38 mg/dL\par Total Protein:  7.3 g/dL\par Albumin:  3.4 g/dL\par Serum Ca:  9.3 mg/dL\par Total Bili:  0.8 mg/dL\par AST:  34 U/L\par ALT:  14 U/L\par ALK Phos:  382 U/L\par eGFR:  121 mL/min/1.73M2\par \par CA 19-9:  42 U/mL\par CEA:  2.0 ng/mL\par \par Hepatitis C:  Nonreactive\par Hepatitis B Core Ab:  Nonreactive\par Hepatitis B Surface Ag:  Nonreactive\par \par APTT:  31.8 sec\par PT:  13.2 sec\par INR:  1.16 ratio\par \par 3/19/19: Today, Ms. Lagunas reports occasional nausea, no vomiting, no pain.  Abdominal wall fistula drained last on Saturday nigt 8 cc.  Not drained since.  Daughter states color of drainage has changed from creamy to tan color.  Denies, fevers, chills.   During this visit, sutures removed from midline incision, as well as drain removed (ostomy appliance).  Wound well healing with granulation tissue in the periphery of wound bed with yellow slough noted in bottom of wound bed.  Wound packed with wet to dry dressing.  Instruction and demonstration provided to patient's daughter on changing wet to dry dressing, however, advised she may return to the ostomy appliance if she feels drainage is increasing.  She will return to office to see me in 2 weeks for follow up.  \par \par INTERVAL HISTORY:\par 4/2/19- Daughter continues to change midline wound with wet to dry gauze dressing daily.  The patient is with c/o 7-8 out of 10 abdominal pain with movement and activity, partially relieved with Oxycodone.  She denies fever or chills.  Denies nausea or vomiting. Having daily Bm's and passing flatus.  Appetite continues to improve. The daughter states she is eating an adequate amount of food.  She is scheduled to see Dr. Katty Navarro (Heme-onc) tomorrow. \par \par 4/21/19 - MRI Abdomen- No evidence of hepatic metastatic disease.\par \par 4/30/19 - Feeling well. Incisions well healed. Scheduled to start chemotherapy under the care of Dr. Katty Navarro.  Denies abdominal pain, nausea, vomiting or changes in bowel habits. \par \par 7/30/19 - She is currently on Xeloda/Gemzar under the care of Dr. Katty Navarro.  Xeloda recently on hold due to Grade 2-3 hand foot syndrome.  She continues to use Betamethasone cream for the hand/foot syndrome.   CT Abdomen performed on 5/22/19 showing stable findings. States her appetite continues to improve and her weight remains stable.  Tolerating PO intake without nausea or vomiting. Reports daily BM's and passing flatus.  Denies fever or chills.  Reports occasional lower left/right abdominal discomfort with movement however has relief with wearing an abdominal binder.  \par \par 12/17/19- She completed 6 months of adjuvant Gemzar/Xeloda under the care of Dr. Navarro.   The patient states she continues to have 9 out of 10 abdominal pain daily with walking and every movement.  She is also experiencing bloating and distention.  She wears an abdominal binder with mild relief.  She was seen by GI who recommended a PPI and Gas-x and the patient states she has minimal relief from both.  Appetite is improving and her weight remains stable.  Reports daily BM's and passing flatus.  Denies fever or chills.  BW 11/2019 -  (29 U/mL); CEA (2.0 ng/mL); LFT's WNL;   CT C/A/P performed on 10/10/19 with stable findings, no evidence of metastatic disease.  Diastasis of the anterior abdominal wall with non obstructed small bowel. \par \par 12/8/2020- Continues f/u with Dr. Katty Navarro and was last seen 11/2/2020.  Patient underwent a f/u CT C/A/P on 11/6/2020 showing an unchanged right cardiophrenic lymph node and 2 small indeterminate lymph nodes at the root of the mesentery which are slightly increased in size.  F/u is recommended to assess for change.   Blood work 11/2/2020 (CEA- 1.9 ng/mL;  - 39 U/mL).    Reports ongoing abdominal pain/discomfort with activity and movement.  She has not seen a hernia specialist.  States her appetite isn't as great since surgery.  Weight remains stable. Denies nausea, vomiting.  Denies changes in bowel habits.  No fever or chills. \par

## 2020-12-08 NOTE — REASON FOR VISIT
[Follow-Up Visit] : a follow-up visit for [Family Member] : family member [FreeTextEntry2] : ampullary cancer

## 2020-12-08 NOTE — PHYSICAL EXAM
[Normal] : supple, no neck mass and thyroid not enlarged [Normal] : oriented to person, place and time, with appropriate affect [de-identified] : soft, NT, slightly distended;  well healed incisions; large reducible abdominal hernia

## 2020-12-17 ENCOUNTER — OUTPATIENT (OUTPATIENT)
Dept: OUTPATIENT SERVICES | Facility: HOSPITAL | Age: 55
LOS: 1 days | Discharge: HOME | End: 2020-12-17

## 2020-12-17 DIAGNOSIS — C24.1 MALIGNANT NEOPLASM OF AMPULLA OF VATER: ICD-10-CM

## 2020-12-18 NOTE — PROGRESS NOTE ADULT - ASSESSMENT
53 year old woman w/ PMH sig for invasive ampullary adenocarcinoma POD 1 s/p whipple.  Plan:  - Pain control -PCA  - Swanson  - NGT tube  - NPO  - Monitor bowel function  - OOB as tolerated        Surgical Oncology (D Team)  p. [id number] D/C all anbx   No evidence of inflammation on colonoscopy 53 year old woman w/ PMH sig for invasive ampullary adenocarcinoma POD 1 s/p whipple.  Plan:  - Pain control -PCA  - Swanson  - NGT tube  - NPO  - Monitor bowel function  - OOB as tolerated  - Pt seen and evaluated with Dr Hearn      Surgical Oncology (D Team)  p. [phone] -as above -as above -as above -as above  -Discharge planning Continue Anbx Continue Anbx Continue Anbx Start bentyl -serial abd exams  -better -cont abx  -serial abd exams  -better  -for colonoscopy  -check urine cx given dysuria -cont abx  -serial abd exams  -better -cont abx  -serial abd exams -cont abx  -serial abd exams  -better  -for colonoscopy -cont abx  -serial abd exams  -better  -for colonoscopy  -check urine cx given dysuria

## 2020-12-23 ENCOUNTER — RESULT REVIEW (OUTPATIENT)
Age: 55
End: 2020-12-23

## 2020-12-23 ENCOUNTER — LABORATORY RESULT (OUTPATIENT)
Age: 55
End: 2020-12-23

## 2020-12-23 ENCOUNTER — APPOINTMENT (OUTPATIENT)
Age: 55
End: 2020-12-23

## 2020-12-23 LAB
BASOPHILS # BLD AUTO: 0.04 K/UL — SIGNIFICANT CHANGE UP (ref 0–0.2)
BASOPHILS NFR BLD AUTO: 0.6 % — SIGNIFICANT CHANGE UP (ref 0–2)
EOSINOPHIL # BLD AUTO: 0.12 K/UL — SIGNIFICANT CHANGE UP (ref 0–0.5)
EOSINOPHIL NFR BLD AUTO: 1.7 % — SIGNIFICANT CHANGE UP (ref 0–6)
HCT VFR BLD CALC: 34.6 % — SIGNIFICANT CHANGE UP (ref 34.5–45)
HGB BLD-MCNC: 11.3 G/DL — LOW (ref 11.5–15.5)
IMM GRANULOCYTES NFR BLD AUTO: 0.3 % — SIGNIFICANT CHANGE UP (ref 0–1.5)
LYMPHOCYTES # BLD AUTO: 2.45 K/UL — SIGNIFICANT CHANGE UP (ref 1–3.3)
LYMPHOCYTES # BLD AUTO: 34.2 % — SIGNIFICANT CHANGE UP (ref 13–44)
MCHC RBC-ENTMCNC: 24.4 PG — LOW (ref 27–34)
MCHC RBC-ENTMCNC: 32.7 G/DL — SIGNIFICANT CHANGE UP (ref 32–36)
MCV RBC AUTO: 74.7 FL — LOW (ref 80–100)
MONOCYTES # BLD AUTO: 0.68 K/UL — SIGNIFICANT CHANGE UP (ref 0–0.9)
MONOCYTES NFR BLD AUTO: 9.5 % — SIGNIFICANT CHANGE UP (ref 2–14)
NEUTROPHILS # BLD AUTO: 3.86 K/UL — SIGNIFICANT CHANGE UP (ref 1.8–7.4)
NEUTROPHILS NFR BLD AUTO: 53.7 % — SIGNIFICANT CHANGE UP (ref 43–77)
NRBC # BLD: 0 /100 WBCS — SIGNIFICANT CHANGE UP (ref 0–0)
PLATELET # BLD AUTO: 124 K/UL — LOW (ref 150–400)
RBC # BLD: 4.63 M/UL — SIGNIFICANT CHANGE UP (ref 3.8–5.2)
RBC # FLD: 14 % — SIGNIFICANT CHANGE UP (ref 10.3–14.5)
WBC # BLD: 7.17 K/UL — SIGNIFICANT CHANGE UP (ref 3.8–10.5)
WBC # FLD AUTO: 7.17 K/UL — SIGNIFICANT CHANGE UP (ref 3.8–10.5)

## 2020-12-29 ENCOUNTER — NON-APPOINTMENT (OUTPATIENT)
Age: 55
End: 2020-12-29

## 2021-01-08 ENCOUNTER — APPOINTMENT (OUTPATIENT)
Dept: SURGERY | Facility: CLINIC | Age: 56
End: 2021-01-08
Payer: MEDICAID

## 2021-01-08 VITALS
DIASTOLIC BLOOD PRESSURE: 65 MMHG | HEIGHT: 59 IN | RESPIRATION RATE: 15 BRPM | WEIGHT: 103 LBS | HEART RATE: 64 BPM | OXYGEN SATURATION: 99 % | BODY MASS INDEX: 20.76 KG/M2 | TEMPERATURE: 97.8 F | SYSTOLIC BLOOD PRESSURE: 107 MMHG

## 2021-01-08 PROCEDURE — 99244 OFF/OP CNSLTJ NEW/EST MOD 40: CPT

## 2021-01-08 RX ORDER — METFORMIN HYDROCHLORIDE 500 MG/1
500 TABLET, COATED ORAL
Qty: 30 | Refills: 0 | Status: ACTIVE | COMMUNITY
Start: 2020-01-10 | End: 1900-01-01

## 2021-01-08 RX ORDER — SIMETHICONE 80 MG/1
80 TABLET, CHEWABLE ORAL
Qty: 30 | Refills: 1 | Status: DISCONTINUED | COMMUNITY
Start: 2019-11-01 | End: 2021-01-08

## 2021-01-08 NOTE — REASON FOR VISIT
[Consultation] : a consultation visit [Family Member] : family member [FreeTextEntry1] : referred by Dr. Dillon Bourne

## 2021-01-08 NOTE — PHYSICAL EXAM
[No HSM] : no hepatosplenomegaly [No Rash or Lesion] : No rash or lesion [Alert] : alert [Calm] : calm [Abdominal Masses] : No abdominal masses [de-identified] : nad [de-identified] : Soft, nontender, mildly distended, large nontender reducible incisional hernia [de-identified] : nl

## 2021-01-08 NOTE — PLAN
[FreeTextEntry1] : Daughter Olegario states she is Type 2 Diabetic but has not taken her Metformin 500 mg daily since October since she has not had an anointment with her PCP. I renewed for 1 month with the understanding that she MUST see her PCP for renewals and f/u. Rosemarie Haase NP.

## 2021-01-08 NOTE — HISTORY OF PRESENT ILLNESS
[de-identified] : Mae is a 55 year old woman here with her daughter as she does not speak English. She speaks primarily Bangali. She is s/p Whipple procedure for T3N1 (1/24 lymph nodes positive) ampullary adenocarcinoma, complicated by GDA bleed RTOR for oversewing of vessel, abdominal wall bleed requiring opening of the incision and secondary closure with Dermaclose, a CT in November 2020 demonstrates an abdominal wall " diastases".  The patient's main complaint is chronic abdominal pain.  She denies nausea vomiting and moves her bowels daily.  She also complains of an abdominal wall bulge.

## 2021-01-08 NOTE — CONSULT LETTER
[Consult Letter:] : I had the pleasure of evaluating your patient, [unfilled]. [Consult Closing:] : Thank you very much for allowing me to participate in the care of this patient.  If you have any questions, please do not hesitate to contact me. [Dear  ___] : Dear  [unfilled], [I had the pleasure of evaluating your patient, [unfilled].] : I had the pleasure of evaluating your patient, [unfilled]. [Please see my note below.] : Please see my note below. [Sincerely,] : Sincerely, [FreeTextEntry3] : Manan Veronica M.D., F.A.C.S, F.A.S.C.R.S

## 2021-01-08 NOTE — ASSESSMENT
[FreeTextEntry1] : In summary the patient has a history of an open abdomen following a Whipple for node positive pancreatic cancer.  She has no evidence of recurrence at the present time.  She has chronic abdominal pain and a large incisional hernia.  I had a long conversation with the patient and her daughter.  I explained that repair of her incisional hernia would be a major undertaking with abdominal wall reconstruction and placement of prosthetic mesh.  I explained that there would be no guarantee that her abdominal pain would be alleviated with such an operation and may in fact exacerbate her pain.  I explained the risks benefits and alternatives of surgery including the risks of bleeding infection recurrent hernia and mesh complications.  Surgery would also likely involve preoperative Botox and plastic surgery involvement for possible component separation.  I recommended that she give it a few months to think it over with her family and discuss with her other medical doctors whether the risks of surgery outweigh the benefits.  At the present time there is no indication for urgent surgery.  I will see her in the office in 3 to 4 months in follow-up

## 2021-01-22 ENCOUNTER — APPOINTMENT (OUTPATIENT)
Dept: INTERNAL MEDICINE | Facility: CLINIC | Age: 56
End: 2021-01-22

## 2021-02-02 ENCOUNTER — OUTPATIENT (OUTPATIENT)
Dept: OUTPATIENT SERVICES | Facility: HOSPITAL | Age: 56
LOS: 1 days | Discharge: HOME | End: 2021-02-02

## 2021-02-02 DIAGNOSIS — C24.1 MALIGNANT NEOPLASM OF AMPULLA OF VATER: ICD-10-CM

## 2021-02-08 ENCOUNTER — RESULT REVIEW (OUTPATIENT)
Age: 56
End: 2021-02-08

## 2021-02-08 ENCOUNTER — LABORATORY RESULT (OUTPATIENT)
Age: 56
End: 2021-02-08

## 2021-02-08 ENCOUNTER — APPOINTMENT (OUTPATIENT)
Age: 56
End: 2021-02-08

## 2021-02-08 ENCOUNTER — APPOINTMENT (OUTPATIENT)
Age: 56
End: 2021-02-08
Payer: MEDICAID

## 2021-02-08 VITALS
SYSTOLIC BLOOD PRESSURE: 116 MMHG | RESPIRATION RATE: 16 BRPM | WEIGHT: 104.28 LBS | HEART RATE: 61 BPM | HEIGHT: 59 IN | OXYGEN SATURATION: 100 % | TEMPERATURE: 97.2 F | BODY MASS INDEX: 21.02 KG/M2 | DIASTOLIC BLOOD PRESSURE: 72 MMHG

## 2021-02-08 DIAGNOSIS — G47.00 INSOMNIA, UNSPECIFIED: ICD-10-CM

## 2021-02-08 LAB
BASOPHILS # BLD AUTO: 0.03 K/UL — SIGNIFICANT CHANGE UP (ref 0–0.2)
BASOPHILS NFR BLD AUTO: 0.5 % — SIGNIFICANT CHANGE UP (ref 0–2)
EOSINOPHIL # BLD AUTO: 0.15 K/UL — SIGNIFICANT CHANGE UP (ref 0–0.5)
EOSINOPHIL NFR BLD AUTO: 2.4 % — SIGNIFICANT CHANGE UP (ref 0–6)
HCT VFR BLD CALC: 35 % — SIGNIFICANT CHANGE UP (ref 34.5–45)
HGB BLD-MCNC: 11.5 G/DL — SIGNIFICANT CHANGE UP (ref 11.5–15.5)
IMM GRANULOCYTES NFR BLD AUTO: 0.3 % — SIGNIFICANT CHANGE UP (ref 0–1.5)
LYMPHOCYTES # BLD AUTO: 2.51 K/UL — SIGNIFICANT CHANGE UP (ref 1–3.3)
LYMPHOCYTES # BLD AUTO: 39.9 % — SIGNIFICANT CHANGE UP (ref 13–44)
MCHC RBC-ENTMCNC: 24.8 PG — LOW (ref 27–34)
MCHC RBC-ENTMCNC: 32.9 G/DL — SIGNIFICANT CHANGE UP (ref 32–36)
MCV RBC AUTO: 75.4 FL — LOW (ref 80–100)
MONOCYTES # BLD AUTO: 0.68 K/UL — SIGNIFICANT CHANGE UP (ref 0–0.9)
MONOCYTES NFR BLD AUTO: 10.8 % — SIGNIFICANT CHANGE UP (ref 2–14)
NEUTROPHILS # BLD AUTO: 2.9 K/UL — SIGNIFICANT CHANGE UP (ref 1.8–7.4)
NEUTROPHILS NFR BLD AUTO: 46.1 % — SIGNIFICANT CHANGE UP (ref 43–77)
NRBC # BLD: 0 /100 WBCS — SIGNIFICANT CHANGE UP (ref 0–0)
PLATELET # BLD AUTO: 119 K/UL — LOW (ref 150–400)
RBC # BLD: 4.64 M/UL — SIGNIFICANT CHANGE UP (ref 3.8–5.2)
RBC # FLD: 13.7 % — SIGNIFICANT CHANGE UP (ref 10.3–14.5)
WBC # BLD: 6.29 K/UL — SIGNIFICANT CHANGE UP (ref 3.8–10.5)
WBC # FLD AUTO: 6.29 K/UL — SIGNIFICANT CHANGE UP (ref 3.8–10.5)

## 2021-02-08 PROCEDURE — 99215 OFFICE O/P EST HI 40 MIN: CPT

## 2021-03-13 PROBLEM — G47.00 INSOMNIA, PERSISTENT: Status: ACTIVE | Noted: 2021-02-08

## 2021-03-13 NOTE — REVIEW OF SYSTEMS
[Fatigue] : fatigue [Constipation] : constipation [Insomnia] : insomnia [Negative] : Allergic/Immunologic [FreeTextEntry2] : + poor appetite [FreeTextEntry7] : + abdominal discomfort

## 2021-03-13 NOTE — HISTORY OF PRESENT ILLNESS
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 \par \par Initially was admitted to hospitals on 12/18/18 with dizziness, found to have elevated LFTs. MRCP demonstrated a dilatation of the CBD. ERCP on 12/22/18 performed c/w ampullary mass with obstruction. Pathology c/w adenocarcinoma. Pt was discharged for out pt follow up with surg onc and med onc but was re-admitted on 1/5/19 with n/v, worsening abdominal pain, found to have severe sepsis with E coli bacteremia 2/2 acute cholangitis. \par 1/7: ERCP performed. Stent was removed from the biliary tree. One plastic stent placed\par 1/5 CT A/P Heterogeneity of the right hepatic lobe with questionable 1.3 cm hypoattenuating lesion in segment 7.\par 1/8 MRI Abd: No suspicious liver lesions. No abnormality is identified to correspond with questionable lesion identified on prior CT. 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  Taken to the OR for evacuation of hematoma, cessation of gastroduodenal artery bleeding with two hemostatic stitches, and placement of abthera VAC.  Required pressors in the SICU, then developed fever. \par 1/21-1/22: OR exploratory laparotomy, drainage of abdominal abscess, repair of abdominal wall hernia with Strattice mesh, b/ abdominal wall advancement flaps. Post-op patient remained intubated and off vasopressors. FENA calculated to be pre-renal. 500CC albumin bolus given, urine output improved. 1/22/19 attempted spontaneous breathing trials but pt hypoxic. Pt lactate uptrending, resuscitated w/IVF, and pt hypotensive requiring pressors. UCx growing candida, Diflucan changed to micafungin to r/o resistant organisms in the setting of worsening sepsis. \par 1/23/19, patient noted to have blood oozing from midline incision while hypotensive and on pressors. Peak pressures noted to be elevated on vent and abdomen increasingly distended. All staples removed and a large amount of clot evacuated from midline incision. 1U PRBC given. No active bleeding appreciated. Fascia remains closed. Wound packed with wet to dry dressing. Vitamin K given for elevated INR. \par 1/24-1/29: remained in SICU, started on TPN, diuresed, finally extubated. \par 2/1: septic shock again, pressors restarted\par 2/2-2/11: CT scan with peritonitis, new abscesses. Ab and Antifungals started. Wound with increase in drainage. Octreotide started for increased output \par 2/12: drainage of abscesses by IR.  \par 2/18: Wound was partially closed by plastics with interrupted sutures and an ostomy appliance was placed, VAC removed.\par 3/1/19. d/c home \par \par 4/30-10/21/19: s/p 8 cycles Gemzar/Xeloda \par 10/10/19: CT CAP: no evidence of mets\par 1/20-11/2020: prolonged stay in [facility] due to COVID \par 11/2/21: CT CAP: 2 mildly enlarged mesenteric LN, attention in f/u [de-identified] : adenocarcinoma [de-identified] : CA [id number] [de-identified] : FINAL PATHOLOGY:\par -Invasive adenocarcinoma of the duodenal ampulla (Tumor size: 2.5 x 1.5 x 0.8 cm); Tumor stage: pT3b pN1\par -Vascular and perineural invasion identified\par -1/24 lymph nodes involved by adenocarcinoma\par -Negative margins\par -S/p cholecystectomy ; acute cholecystitis with cholelithiasis \par -Bile duct margin with acute inflammation and focal atypia consistent with low grade dysplasia. \par  [de-identified] : saw Dr. Veronica for incisional hernia repair. Surgery not recommended due to the extent of procedure needed. \par two nights was experiencing abdominal pain, described as cramping, severe. + constipation. Miralax and sennakot did not help. Took it for a few days together (although daughter is not sure). Then stopped. Ongoing constipation, once per day, small amount. \par + insomnia \par

## 2021-03-13 NOTE — REASON FOR VISIT
[Follow-Up Visit] : a follow-up [Family Member] : family member [FreeTextEntry2] : Resected ampullary ca

## 2021-05-06 ENCOUNTER — OUTPATIENT (OUTPATIENT)
Dept: OUTPATIENT SERVICES | Facility: HOSPITAL | Age: 56
LOS: 1 days | Discharge: HOME | End: 2021-05-06

## 2021-05-06 DIAGNOSIS — C24.1 MALIGNANT NEOPLASM OF AMPULLA OF VATER: ICD-10-CM

## 2021-05-07 ENCOUNTER — APPOINTMENT (OUTPATIENT)
Dept: SURGERY | Facility: CLINIC | Age: 56
End: 2021-05-07
Payer: MEDICAID

## 2021-05-07 VITALS
RESPIRATION RATE: 16 BRPM | HEIGHT: 59 IN | HEART RATE: 68 BPM | SYSTOLIC BLOOD PRESSURE: 110 MMHG | DIASTOLIC BLOOD PRESSURE: 67 MMHG | BODY MASS INDEX: 20.76 KG/M2 | WEIGHT: 103 LBS | TEMPERATURE: 96.7 F | OXYGEN SATURATION: 98 %

## 2021-05-07 PROCEDURE — 99213 OFFICE O/P EST LOW 20 MIN: CPT

## 2021-05-07 RX ORDER — MIRTAZAPINE 15 MG/1
15 TABLET, FILM COATED ORAL
Qty: 30 | Refills: 0 | Status: DISCONTINUED | COMMUNITY
Start: 2021-02-08 | End: 2021-05-07

## 2021-05-07 NOTE — ASSESSMENT
[FreeTextEntry1] : In summary the patient has a complicated surgical history following a Whipple with subsequent takeback for bleeding and eventual open abdomen.  This has resulted in moderate sized incisional hernia.  The patient has chronic abdominal pain.  She states that her abdominal pain is worsened over the past few weeks.  I ordered a CT of the abdomen and pelvis for further evaluation.  If there are no new findings I will recommend continued nonoperative management as repair of her incisional hernia would be a huge undertaking with significant risk of morbidity and mortality and would not likely alleviate her chronic abdominal pain  Final plan will be determined after her CT

## 2021-05-07 NOTE — PHYSICAL EXAM
[de-identified] : Softly distended, no focal tenderness, no moderate-sized midline incisional hernia

## 2021-05-07 NOTE — HISTORY OF PRESENT ILLNESS
[de-identified] : Mae is a 55 year old woman here with her daughter as she does not speak English. She speaks primarily Bangali. 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 The patient's main complaint is chronic abdominal pain. She denies nausea vomiting and moves her bowels daily. She also complains of an abdominal wall bulge. She has chronic abdominal pain and a large incisional hernia. I had a long conversation with the patient and her daughter. I explained that repair of her incisional hernia would be a major undertaking with abdominal wall reconstruction and placement of prosthetic mesh. I explained that there would be no guarantee that her abdominal pain would be alleviated with such an operation and may in fact exacerbate her pain. I explained the risks benefits and alternatives of surgery including the risks of bleeding infection recurrent hernia and mesh complications. Surgery would also likely involve preoperative Botox and plastic surgery involvement for possible component separation. I recommended that she give it a few months to think it over with her family and discuss with her other medical doctors whether the risks of surgery outweigh the benefits. At the present time there is no indication for urgent surgery.\par \par Interval history.  The patient complains of persistent abdominal pain.  She takes occasional narcotics.  She moves her bowels daily.  She denies vomiting.

## 2021-05-10 ENCOUNTER — APPOINTMENT (OUTPATIENT)
Age: 56
End: 2021-05-10
Payer: MEDICAID

## 2021-05-10 ENCOUNTER — APPOINTMENT (OUTPATIENT)
Age: 56
End: 2021-05-10

## 2021-05-10 ENCOUNTER — RESULT REVIEW (OUTPATIENT)
Age: 56
End: 2021-05-10

## 2021-05-10 ENCOUNTER — LABORATORY RESULT (OUTPATIENT)
Age: 56
End: 2021-05-10

## 2021-05-10 VITALS
WEIGHT: 105.82 LBS | OXYGEN SATURATION: 99 % | SYSTOLIC BLOOD PRESSURE: 120 MMHG | RESPIRATION RATE: 14 BRPM | BODY MASS INDEX: 21.37 KG/M2 | HEART RATE: 68 BPM | TEMPERATURE: 98 F | DIASTOLIC BLOOD PRESSURE: 74 MMHG

## 2021-05-10 LAB
BASOPHILS # BLD AUTO: 0.05 K/UL — SIGNIFICANT CHANGE UP (ref 0–0.2)
BASOPHILS NFR BLD AUTO: 0.8 % — SIGNIFICANT CHANGE UP (ref 0–2)
EOSINOPHIL # BLD AUTO: 0.17 K/UL — SIGNIFICANT CHANGE UP (ref 0–0.5)
EOSINOPHIL NFR BLD AUTO: 2.6 % — SIGNIFICANT CHANGE UP (ref 0–6)
HCT VFR BLD CALC: 35.2 % — SIGNIFICANT CHANGE UP (ref 34.5–45)
HGB BLD-MCNC: 11.7 G/DL — SIGNIFICANT CHANGE UP (ref 11.5–15.5)
IMM GRANULOCYTES NFR BLD AUTO: 0.3 % — SIGNIFICANT CHANGE UP (ref 0–1.5)
LYMPHOCYTES # BLD AUTO: 2.51 K/UL — SIGNIFICANT CHANGE UP (ref 1–3.3)
LYMPHOCYTES # BLD AUTO: 38.2 % — SIGNIFICANT CHANGE UP (ref 13–44)
MCHC RBC-ENTMCNC: 25.1 PG — LOW (ref 27–34)
MCHC RBC-ENTMCNC: 33.2 G/DL — SIGNIFICANT CHANGE UP (ref 32–36)
MCV RBC AUTO: 75.5 FL — LOW (ref 80–100)
MONOCYTES # BLD AUTO: 0.64 K/UL — SIGNIFICANT CHANGE UP (ref 0–0.9)
MONOCYTES NFR BLD AUTO: 9.7 % — SIGNIFICANT CHANGE UP (ref 2–14)
NEUTROPHILS # BLD AUTO: 3.18 K/UL — SIGNIFICANT CHANGE UP (ref 1.8–7.4)
NEUTROPHILS NFR BLD AUTO: 48.4 % — SIGNIFICANT CHANGE UP (ref 43–77)
NRBC # BLD: 0 /100 WBCS — SIGNIFICANT CHANGE UP (ref 0–0)
PLATELET # BLD AUTO: 127 K/UL — LOW (ref 150–400)
RBC # BLD: 4.66 M/UL — SIGNIFICANT CHANGE UP (ref 3.8–5.2)
RBC # FLD: 13.9 % — SIGNIFICANT CHANGE UP (ref 10.3–14.5)
WBC # BLD: 6.57 K/UL — SIGNIFICANT CHANGE UP (ref 3.8–10.5)
WBC # FLD AUTO: 6.57 K/UL — SIGNIFICANT CHANGE UP (ref 3.8–10.5)

## 2021-05-10 PROCEDURE — 99214 OFFICE O/P EST MOD 30 MIN: CPT

## 2021-05-17 ENCOUNTER — RESULT REVIEW (OUTPATIENT)
Age: 56
End: 2021-05-17

## 2021-05-19 ENCOUNTER — APPOINTMENT (OUTPATIENT)
Dept: CT IMAGING | Facility: IMAGING CENTER | Age: 56
End: 2021-05-19
Payer: MEDICAID

## 2021-05-19 ENCOUNTER — OUTPATIENT (OUTPATIENT)
Dept: OUTPATIENT SERVICES | Facility: HOSPITAL | Age: 56
LOS: 1 days | End: 2021-05-19
Payer: MEDICAID

## 2021-05-19 DIAGNOSIS — C24.1 MALIGNANT NEOPLASM OF AMPULLA OF VATER: ICD-10-CM

## 2021-05-19 DIAGNOSIS — Z00.8 ENCOUNTER FOR OTHER GENERAL EXAMINATION: ICD-10-CM

## 2021-05-19 PROCEDURE — 74177 CT ABD & PELVIS W/CONTRAST: CPT

## 2021-05-19 PROCEDURE — 74177 CT ABD & PELVIS W/CONTRAST: CPT | Mod: 26

## 2021-05-19 PROCEDURE — 71260 CT THORAX DX C+: CPT | Mod: 26

## 2021-05-19 PROCEDURE — 82565 ASSAY OF CREATININE: CPT

## 2021-05-19 PROCEDURE — 71260 CT THORAX DX C+: CPT

## 2021-05-22 NOTE — HISTORY OF PRESENT ILLNESS
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 \par \par Initially was admitted to [facility] on 12/18/18 with dizziness, found to have elevated LFTs. MRCP demonstrated a dilatation of the CBD. ERCP on 12/22/18 performed c/w ampullary mass with obstruction. Pathology c/w adenocarcinoma. Pt was discharged for out pt follow up with surg onc and med onc but was re-admitted on 1/5/19 with n/v, worsening abdominal pain, found to have severe sepsis with E coli bacteremia 2/2 acute cholangitis. \par 1/7: ERCP performed. Stent was removed from the biliary tree. One plastic stent placed\par 1/5 CT A/P Heterogeneity of the right hepatic lobe with questionable 1.3 cm hypoattenuating lesion in segment 7.\par 1/8 MRI Abd: No suspicious liver lesions. No abnormality is identified to correspond with questionable lesion identified on prior CT. 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  Taken to the OR for evacuation of hematoma, cessation of gastroduodenal artery bleeding with two hemostatic stitches, and placement of abthera VAC.  Required pressors in the SICU, then developed fever. \par 1/21-1/22: OR exploratory laparotomy, drainage of abdominal abscess, repair of abdominal wall hernia with Strattice mesh, b/ abdominal wall advancement flaps. Post-op patient remained intubated and off vasopressors. FENA calculated to be pre-renal. 500CC albumin bolus given, urine output improved. 1/22/19 attempted spontaneous breathing trials but pt hypoxic. Pt lactate uptrending, resuscitated w/IVF, and pt hypotensive requiring pressors. UCx growing candida, Diflucan changed to micafungin to r/o resistant organisms in the setting of worsening sepsis. \par 1/23/19, patient noted to have blood oozing from midline incision while hypotensive and on pressors. Peak pressures noted to be elevated on vent and abdomen increasingly distended. All staples removed and a large amount of clot evacuated from midline incision. 1U PRBC given. No active bleeding appreciated. Fascia remains closed. Wound packed with wet to dry dressing. Vitamin K given for elevated INR. \par 1/24-1/29: remained in SICU, started on TPN, diuresed, finally extubated. \par 2/1: septic shock again, pressors restarted\par 2/2-2/11: CT scan with peritonitis, new abscesses. Ab and Antifungals started. Wound with increase in drainage. Octreotide started for increased output \par 2/12: drainage of abscesses by IR.  \par 2/18: Wound was partially closed by plastics with interrupted sutures and an ostomy appliance was placed, VAC removed.\par 3/1/19. d/c home \par \par 4/30-10/21/19: s/p 8 cycles Gemzar/Xeloda \par 10/10/19: CT CAP: no evidence of mets\par 1/20-11/2020: prolonged stay in [facility] due to COVID \par 11/6/20: CT CAP: 2 mildly enlarged mesenteric LN, attention in f/u [de-identified] : adenocarcinoma [de-identified] : CA [id number] [de-identified] : FINAL PATHOLOGY:\par -Invasive adenocarcinoma of the duodenal ampulla (Tumor size: 2.5 x 1.5 x 0.8 cm); Tumor stage: pT3b pN1\par -Vascular and perineural invasion identified\par -1/24 lymph nodes involved by adenocarcinoma\par -Negative margins\par -S/p cholecystectomy ; acute cholecystitis with cholelithiasis \par -Bile duct margin with acute inflammation and focal atypia consistent with low grade dysplasia. \par  [de-identified] : Patient complained of persistent abdominal pain for the past 1 month, she saw Dr. Veronica last week and requested CT abdomen/pelvis scan. She also reported constipation (requested medication for relief). \par was not able to get a GI referral due to insurance issues.

## 2021-06-02 ENCOUNTER — APPOINTMENT (OUTPATIENT)
Dept: PHYSICAL MEDICINE AND REHAB | Facility: CLINIC | Age: 56
End: 2021-06-02
Payer: MEDICAID

## 2021-06-02 DIAGNOSIS — M25.512 PAIN IN LEFT SHOULDER: ICD-10-CM

## 2021-06-02 PROCEDURE — 99215 OFFICE O/P EST HI 40 MIN: CPT

## 2021-06-02 NOTE — ASSESSMENT
[FreeTextEntry1] : 57 yo F pmh ampullary carcinoma (adenocarcinoma) dx in Dec 2018, s/p whipple pancreaticoduodenectomy for duodenal adenocarcinoma in 2019, with burning abdominal pain s/p whipple\par -recent ct c/a/p reviewed\par -weight is stable\par -start gabapentin for neuropathic abdominal pain\par -will send rx for oxy ir 5mg daily prn if needed\par -increase bowel regimen for constipation (may take 3 senna daily)\par -may take nsaids prn\par -x rays ordered for b/l knee pain, possible OA.  patient will consider PT program for L shoulder and b/l knee pain once abdominal pain improved, as pain worse with movement. \par -hx of CTS, states she will start wearing wrist splints\par -hx of L shoulder adhesive capsulitis, improved with steroid injection per patient.\par -follow up in 3-4 weeks

## 2021-06-02 NOTE — PHYSICAL EXAM
[FreeTextEntry1] : GEN: AAOx3, NAD.\par PSYCH: Normal mood and affect. Responds appropriately to commands.\par EYES: Sclerae Anicteric. No discharge. EOMI.\par RESP: Breathing unlabored.\par CV: DP pulses 2+ and equal. No varicosities noted.\par ABD: distended, soft to palpation except for firmness in RUQ, nontender to palpation in all quadrants. wearing abd binder.\par EXT: No C/C/E.\par SKIN: No lesions noted.\par STRENGTH: 5-/5 all extremities\par TONE: Normal, No clonus.\par REFLEXES: 2+ symmetric patella \par SENS: Grossly intact to light touch bilateral lower extremities.\par INSP: Spine alignment is midline, with no evidence of scoliosis.\par STANCE: No Trendelenburg with single leg stance.\par GAIT: Non antalgic, normal reciprocating heel to toe\par LUMBAR ROM: Flexion limited by abdominal discomfort \par SHOULDER: limited ROM b/l shoulder abduction, + TTP L upper back\par PALP: There is no tenderness over the midline spinous processes, paravertebral muscles, SIJ, or greater trochanters bilaterally. \par Special: + tinels, + phalens b/l\par

## 2021-06-02 NOTE — HISTORY OF PRESENT ILLNESS
[FreeTextEntry1] : 57 yo F pmh dm2, CTS, ampullary carcinoma (adenocarcinoma) dx in Dec 2018, s/p whipple pancreaticoduodenectomy for duodenal adenocarcinoma in 2019. course complicated by hemorrhagic shock, evacuation of hematoma, pressors, msicu stay, ex lap.  She was discharged home 3/1/2019 and received Gemzar/Xeloda, CT 10/2019 showed no evidence of metastatic disease. Patient had repeat CT CAP in 11/2020 which showed 2 enlarged mesentaric lymph nodes.   Repeat CT done 5/2020 revealed unchanged cardiophrenic and mesenteric root lymph nodes, slightly increased in size, recommended for follow up to assess for change. \par  Per chart could not tolerate opioids.  Patient presents today to [facility] for initial visit for cancer rehab.  \par Patient describes pain in the abdomen, states it is burning, moving. She states it is 8/10, worse with ambulation, improves with rest.  She also has generalized pain which she states is not burning, located "all over", which started after her surgery.  Pain worsening since her surgery.  She also reports b/l knee pain which started after her hospitalization. She also has limited shoulder ROM.\par Reports difficulty with sleep at times. \par \par She takes miralax and senna daily for constipation, states on average has a bm daily but sometimes has hard stool. \par She took gabapentin in the past for CTS but stopped taking it after her whipple surgery.\par \par She takes tylenol prn for pain but states it doesn't relieve her pain.\par States she took oxycodone in the past but did not feel well on it, felt light headed, dizzy, tired when she took it. \par i stop verified, no opioids prescribed\par Reference #: [id number] \par \par She lives in a private home with 8-10 stairs to enter, lives with her daughter

## 2021-06-08 ENCOUNTER — APPOINTMENT (OUTPATIENT)
Dept: SURGICAL ONCOLOGY | Facility: CLINIC | Age: 56
End: 2021-06-08
Payer: MEDICAID

## 2021-06-08 VITALS
HEART RATE: 67 BPM | SYSTOLIC BLOOD PRESSURE: 106 MMHG | WEIGHT: 110 LBS | DIASTOLIC BLOOD PRESSURE: 66 MMHG | OXYGEN SATURATION: 99 % | RESPIRATION RATE: 14 BRPM | BODY MASS INDEX: 22.18 KG/M2 | HEIGHT: 59 IN

## 2021-06-08 PROCEDURE — 99214 OFFICE O/P EST MOD 30 MIN: CPT

## 2021-06-15 NOTE — HISTORY OF PRESENT ILLNESS
[de-identified] : 56 year old female presents for a follow up visit.   \par \par - HOSPITAL COURSE- Hospitalized at [facility] from 1/5/19-3/5/19\par 53 y.o. 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 Patient had acute onset of RUQ pain yesterday, with no inciting factors or relief with aleve. The patient's family also endorsed 1-2 episodes of NBNB emesis yesterday and has had decreased PO intake over the last 36 hours. As of diagnosis of cancer, the patient has not had follow up with oncology or surg-oncology due insurance issues. The patient has an appointment with [facility] on Wednesday for surgical evaluation. In the ED, patient febrile to 101.5, HR 98, BPs decreased to 98/41, found with leukocytosis to 15.2, lactate 4.4. She was found with RUQ pain, fever, and jaundice (Bilirubin 3.9) consistent with acute cholangitis. CT A/P revealed Hyperattenuation and mild wall thickening involving the CBD raises the possibility of cholangitis with pneumobilia related to recent ERCP. In the ED, patient received IVF, vanc/zosyn, morphine, and zofran. Patient admitted with severe sepsis 2/2 acute cholangitis. MICU was consulted for hypotension, but this improved s/p fluids and IV antibiotics and patient did not warrant MICU level care. GI was consulted and recommended ERCP. The patient was maintained on zosyn for IV antibiotics. Patient remained stable on zosyn, white count trended down, lactate improved, and fevers were resolving. BCs ultimately grew E.Coli. 1/7: ERCP performed. Stent was removed from the biliary tree. One plastic stent was placed into the common bile duct. The major papilla appeared to have a mass.Oncology was consulted given patient's poor outpatient oncology follow up due to insurance issues, they recommended CT scan and  MRI abdomen/pelvis with contrast for continued staging of cancer workup. \par \par 1/8/19 CT A/P - showed Small bilateral pleural effusions with bibasilar atelectasis. No lung nodules. Mildly enlarged right cardiophrenic angle lymph node is unchanged since December 18, 2018 is indeterminate\par \par 1/8/19  MRI-  IMPRESSION: *  No suspicious liver lesions. No abnormality is identified to correspond with questionable lesion identified on prior CT. * Intra and extrahepatic biliary ductal dilation, with mural enhancement which may be seen in the setting of cholangitis. *  Cholelithiasis with gallbladder wall thickening.Pt  transferred to surgical oncology service and went to the OR on 1/11/19. \par \par  1/11/19- S/p ex-lap, Whipple pancreaticoduodenectomy for duodenal adenocarcinoma. \par \par FINAL PATHOLOGY:\par -Invasive adenocarcinoma of the duodenal ampulla (Tumor size: 2.5 x 1.5 x 0.8 cm); Tumor stage: pT3b pN1\par -Vascular and perineural invasion identified\par -1/24 lymph nodes involved by adenocarcinoma\par -Negative margins\par -S/p cholecystectomy ; acute cholecystitis with cholelithiasis \par -Bile duct margin with acute inflammation and focal atypia consistent with low grade dysplasia. \par \par Intraop findings: pylorus sparing Whipple.  Pt tolerated procedure well, without complication.\par \par On 1/18/19, patient was a surgical rapid response for which pt transferred to SICU. Patient was febrile to 102. Cultures sent. Antibiotics changed from zosyn to meropenem. Patient was tachypneic and hypotensive to 70s systolic. CBC revealed drop in H/H to 6/19 with elevated lactate of 9. Decision was made to intubate patient. Massive Transfusion protocol initiated. Patient received 4PRBC 5FFP and 1platelet, and bolus 1L of fluid. Surgical team and attending notified when Peak pressures on vent were 50s and increased abdominal distention. Decision was then made to take patient emergently to OR. Patient had evacuation of hematoma, cessation of gastroduodenal artery bleeding with two hemostatic stitches, and placement of abthera VAC. Pt transferred to SICU post op. Patient hypotensive post-op with low UOP. 2 L bolus given.  CBC remained stable. Patient hypotensive to 80s systolic, [place] started and increased, patient was then transitioned to levo. Lactate downtrended to 7.6 and H/H stable. \par \par On 1/19 pt cont to require pressors, resuscitated with IVF, and had drop H/H for which transfused. On 1/21/19, Pt febrile to 100.6 overnight, cultures sent, UA+ nitrite, follow up urine cultures. \par \par Pt RTOR on 1/21/19 for Exploratory laparotomy, drainage of abdominal abscess, repair of abdominal wall hernia with Strattice mesh, b/ abdominal wall advancement flaps. Post-op patient remained intubated and  off vasopressors. FENA calculated to be pre-renal. 500CC albumin bolus given, urine output improved. 1/22/19 attempted spontaneous breathing trials but pt hypoxic. Pt lactate uptrending, resuscitated w/IVF, and pt hypotensive requiring pressors. UCx growing candida, Diflucan changed to micafungin to r/o resistant organisms in the setting of worsening sepsis. On 1/23/19, patient noted to have blood oozing from \par midline incision while hypotensive and on pressors.  Peak pressures noted to be elevated on vent and abdomen increasingly distended.  All staples removed and a large amount of clot evacuated from midline incision. 1U PRBC given. No active bleeding appreciated.  Fascia remains closed.  Wound packed with wet to dry dressing.  Vitamin K given for elevated INR.  1/24/19 nutrition consulted and pt. started on TPN. Plastic consulted bc midline opened for hematoma evacuation yesterday, external tissue expander device (Dermaclose) applied to the wound. Patient hypotensive requiring reinitiation of low dose vasopressors. On 1/25/19, sedatives and pressors were decreased. Pt was volume overloaded and was given Albumin, Lasix, and a Bumex drip. O/N, pt febrile 102, given tylenol. mildly hypotensive SBP 80, started on small dose levo. 1/27 Cont SBT. Decision to continue diuresis with Diamox 1/28 Patient febrile to 101 today. Diuresing with lasix/bumex, giving albumin. Continued SBTs.  On 1/29/19, patient tolerating CPAP. Patient extubated without issue. NGT was removed. Patient started on clears, continue TPN while caloric intake is still low. 1/30/19 CT scan performed and showed b/l moderate pleural effusions with associated atelectasis.  Partially \par imaged right lower lung patchy opacity may represent small infiltrate in the appropriate clinical setting. Moderate abdominopelvic ascites most prominent subjacent to the anterior  abdominal wall.  Left upper and lower quadrant collection measuring greater than fluid attenuation, which may reflect the presence of hemorrhagic products. Mild enhancement is seen in association with \par the anterior abdominal ascites, dependent pelvic ascites, and left mid abdominal collection, consistent with the presence of peritonitis. Findings may be postoperative or related to the presence of hemorrhagic products.Pt started on po labetalol for persistent HTN, however pt with hypotensive episode in PM shortly after administration. Patient given albumin. 2/1/19, persistently febrile, Tmax 101.3, hypotensive, tachycardic, and tachypneac. Albumin and IVF given, BP did not respond. Levophed restarted. JOURDAN X 2 started to drain copious amounts of bilious outpt. JOURDAN #1 700cc and JOURDAN #1 500cc. Given stat dose of vancomycin and \par meropenum.  2/2/19 CT scan performed and showed New diffuse peritoneal enhancement, compatible with peritonitis, more extensive compared to 1/30/2019. Multiple abdominal and pelvic fluid collections consistent with abscesses. New small bowel wall may also edema and colonic wall thickening, likely reactive. Left upper quadrant collection measuring greater than fluid attenuation is grossly unchanged from 1/30/2019 .On 2/3/19 she received 2 u prbc overnight, placed on BIPAP overnight,  vanc \par started overnight, start diflucan, vaso and levo started overnight, ABG and CBC, PM labs, A line placed overnight. Plan for IR drainage tomorrow. On 2/4/19: on pressors overnight, removed in AM. IR today for abscess drainage. advance tiger tube, NGT removed. repeat labs, remove a line after procedure. Increased serobilious wound drainage, BID dressing changes.2/5/19: diflucan to micafungin. decreased seroquil to 25. IV lock. start trickle feeds glucerna. 2/6: no further diuresis at this time, high bilious midline wound output, vac to be placed today by plastics. Sinha out. feeds to goal today.2/7: Central line and sinha removed, discontinued Seroquel, feeds changed to continuous 2/8: Octreotide started for high output from midline wound. Lasix with goal net negative 2L2/9: Resent blood cx and UA, bedside vac change by plastics today.2/11: Patient ordered for CLD w/ TF via NJT, upper and lower portion of abdominal wound closed by plastics, send left JOURDAN for amylase 2/12: CT A&P shows two collections... drainage by IR today. Drain was upsized on the right and left  collection 50cc of pus was drained.2/13: Stable for dispo to floor. 2/15: ID was consulted and recommended Meropenem and to continue Vancomycin for better source control. Patient was also seen by PM&R and they recommended FARIBA if patient is able to participate in care.2/18: Wound was partially closed by plastics with interrupted sutures and an ostomy appliance was placed, VAC removed.2/19 Antibiotics was changed to Meropenem and Zyvox, culture grew VRE.2/20: Patient had a tube check and the LUQ drain was removed. CT showed improvement in abdominal collections. Calorie count was performed. Patient is tolerating a regular diet with GLucerna shakes. Octreotide discontinued on 2/26.2/28: Tube check done and RLQ drain was removed.Patient will be discharged home. Patient will have VNS for wound care and PT.  She will be discharged on 1 more week of antibiotics (Cipro and Flagyl per ID).  Discharged home on 3/1/19. \par \par INTERVAL HISTORY:\par 3/12/19 -  Doing better.  Tolerating PO diet.  + GI fxn.  Pain well controlled.  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/day.\par \par Recent Labs 3/14/19:\par WBC:   9.4 K/uL\par HGB:  9.0 g/dL\par RBC:  3.07 M/uL\par HCT:  25.9%\par PLT:  298 K/uL\par \par Metabolic:\par Na:  141 mmol/L\par K:  3.8 mmol/L\par Cl:  101 mmol/L\par CO2:  27 mmol/L\par Anion Gap:  13 mmol/L\par Glucose:  139 mg/dL\par BUN:  7 mg/dL\par Creat:  0.38 mg/dL\par Total Protein:  7.3 g/dL\par Albumin:  3.4 g/dL\par Serum Ca:  9.3 mg/dL\par Total Bili:  0.8 mg/dL\par AST:  34 U/L\par ALT:  14 U/L\par ALK Phos:  382 U/L\par eGFR:  121 mL/min/1.73M2\par \par CA 19-9:  42 U/mL\par CEA:  2.0 ng/mL\par \par Hepatitis C:  Nonreactive\par Hepatitis B Core Ab:  Nonreactive\par Hepatitis B Surface Ag:  Nonreactive\par \par APTT:  31.8 sec\par PT:  13.2 sec\par INR:  1.16 ratio\par \par 3/19/19: Today, Ms. Lagunas reports occasional nausea, no vomiting, no pain.  Abdominal wall fistula drained last on Saturday nigt 8 cc.  Not drained since.  Daughter states color of drainage has changed from creamy to tan color.  Denies, fevers, chills.   During this visit, sutures removed from midline incision, as well as drain removed (ostomy appliance).  Wound well healing with granulation tissue in the periphery of wound bed with yellow slough noted in bottom of wound bed.  Wound packed with wet to dry dressing.  Instruction and demonstration provided to patient's daughter on changing wet to dry dressing, however, advised she may return to the ostomy appliance if she feels drainage is increasing.  She will return to office to see me in 2 weeks for follow up.  \par \par INTERVAL HISTORY:\par 4/2/19- Daughter continues to change midline wound with wet to dry gauze dressing daily.  The patient is with c/o 7-8 out of 10 abdominal pain with movement and activity, partially relieved with Oxycodone.  She denies fever or chills.  Denies nausea or vomiting. Having daily Bm's and passing flatus.  Appetite continues to improve. The daughter states she is eating an adequate amount of food.  She is scheduled to see Dr. Katty Navarro (Heme-onc) tomorrow. \par \par 4/21/19 - MRI Abdomen- No evidence of hepatic metastatic disease.\par \par 4/30/19 - Feeling well. Incisions well healed. Scheduled to start chemotherapy under the care of Dr. Katty Navarro.  Denies abdominal pain, nausea, vomiting or changes in bowel habits. \par \par 7/30/19 - She is currently on Xeloda/Gemzar under the care of Dr. Katty Navarro.  Xeloda recently on hold due to Grade 2-3 hand foot syndrome.  She continues to use Betamethasone cream for the hand/foot syndrome.   CT Abdomen performed on 5/22/19 showing stable findings. States her appetite continues to improve and her weight remains stable.  Tolerating PO intake without nausea or vomiting. Reports daily BM's and passing flatus.  Denies fever or chills.  Reports occasional lower left/right abdominal discomfort with movement however has relief with wearing an abdominal binder.  \par \par 12/17/19- She completed 6 months of adjuvant Gemzar/Xeloda under the care of Dr. Navarro.   The patient states she continues to have 9 out of 10 abdominal pain daily with walking and every movement.  She is also experiencing bloating and distention.  She wears an abdominal binder with mild relief.  She was seen by GI who recommended a PPI and Gas-x and the patient states she has minimal relief from both.  Appetite is improving and her weight remains stable.  Reports daily BM's and passing flatus.  Denies fever or chills.  BW 11/2019 -  (29 U/mL); CEA (2.0 ng/mL); LFT's WNL;   CT C/A/P performed on 10/10/19 with stable findings, no evidence of metastatic disease.  Diastasis of the anterior abdominal wall with non obstructed small bowel. \par \par 12/8/2020- Continues f/u with Dr. Katty Navarro and was last seen 11/2/2020.  Patient underwent a f/u CT C/A/P on 11/6/2020 showing an unchanged right cardiophrenic lymph node and 2 small indeterminate lymph nodes at the root of the mesentery which are slightly increased in size.  F/u is recommended to assess for change.   Blood work 11/2/2020 (CEA- 1.9 ng/mL;  - 39 U/mL).    Reports ongoing abdominal pain/discomfort with activity and movement.  She has not seen a hernia specialist.  States her appetite isn't as great since surgery.  Weight remains stable. Denies nausea, vomiting.  Denies changes in bowel habits.  No fever or chills. \par \par 6/8/2021- Pt. was seen by Dr. Veronica on 5/7/2021 with c/o worsening "burning" abdominal pain.  Pts pain is r/t moderate sized ventral hernia for which surgery was not recommended.  CT C/A/P performed on 5/19/2021 showed unchanged cardiophrenic and mesenteric root lymph nodes, widemouthed ventral hernia.  Pt. was referred to pain management and was seen by Dr. Leary on 6/2/2021.  She was started on Gabapentin for neuropathic abdominal pain and Oxy IR 5 mg daily PRN. Pt. may take NSAIDs PRN.  She states that she is still experiencing pain despite taking Gabapentin.  She is now with c/o generalized body pain, not just abdominal pain. Continues to wear abdominal binder with some relief.  Pt. also advised to take up to 3 senna daily for constipation.  She continues f/u with Dr. Navarro and was last seen 5/10/2021.  BW 5/2021- CEA (1.4)  (33).  \par

## 2021-06-15 NOTE — PHYSICAL EXAM
[Normal] : supple, no neck mass and thyroid not enlarged [Normal] : oriented to person, place and time, with appropriate affect [de-identified] : soft, NT, slightly distended;  well healed incisions; large reducible abdominal hernia (wearing abdominal binder)

## 2021-06-15 NOTE — ASSESSMENT
[FreeTextEntry1] : 56 year old woman s/p Whipple procedure for T3N1 (1/24 lymph nodes positive) ampullary adenocarcinoma, complicated by GDA bleed RTOR for oversewing of vessel, abdominal wall bleed requiring opening of the incision and secondary closure with Dermaclose, developed low output ECF.  Well healed incisions.\par \par 12/17/19- She completed 6 months of adjuvant Gemzar/Xeloda under the care of Dr. Navarro.   The patient states she continues to have 9 out of 10 abdominal pain daily with walking and every movement.  She is also experiencing bloating and distention.  She wears an abdominal binder with mild relief.  She was seen by GI who recommended a PPI and Gas-x and the patient states she has minimal relief from both.  Appetite is improving and her weight remains stable.  Reports daily BM's and passing flatus.  Denies fever or chills.  BW 11/2019 -  (29 U/mL); CEA (2.0 ng/mL); LFT's WNL;   CT C/A/P performed on 10/10/19 with stable findings, no evidence of metastatic disease.  Diastasis of the anterior abdominal wall with non obstructed small bowel. \par \par 12/8/2020- Continues f/u with Dr. Katty Navarro and was last seen 11/2/2020.  Patient underwent a f/u CT C/A/P on 11/6/2020 showing an unchanged right cardiophrenic lymph node and 2 small indeterminate lymph nodes at the root of the mesentery which are slightly increased in size.  F/u is recommended to assess for change.   Blood work 11/2/2020 (CEA- 1.9 ng/mL;  - 39 U/mL).    Reports ongoing abdominal pain/discomfort with activity and movement.  She has not seen a hernia specialist.  States her appetite isn't as great since surgery.  Weight remains stable. Denies nausea, vomiting.  Denies changes in bowel habits.  No fever or chills. \par \par 6/8/2021- Pt. was seen by Dr. Veronica on 5/7/2021 with c/o worsening "burning" abdominal pain.  Pts pain is r/t moderate sized ventral hernia for which surgery was not recommended.  CT C/A/P performed on 5/19/2021 showed unchanged cardiophrenic and mesenteric root lymph nodes, widemouthed ventral hernia.  Pt. was referred to pain management and was seen by Dr. Leary on 6/2/2021.  She was started on Gabapentin for neuropathic abdominal pain and Oxy IR 5 mg daily PRN. Pt. may take NSAIDs PRN.  She states that she is still experiencing pain despite taking Gabapentin.  She is now with c/o generalized body pain, not just abdominal pain. Continues to wear abdominal binder with some relief.  Pt. also advised to take up to 3 senna daily for constipation.  She continues f/u with Dr. Navarro and was last seen 5/10/2021.  BW 5/2021- CEA (1.4)  (33).  \par . \par PLAN:\par 1) Imaging and BW as per Dr. Katty Navarro \par 2) Pain management f/u\par 3) Referral to general surgery to discuss hernia repair\par 4) Continue to wear abdominal binder\par 5) RTO in 3-6 months

## 2021-08-04 ENCOUNTER — APPOINTMENT (OUTPATIENT)
Dept: PHYSICAL MEDICINE AND REHAB | Facility: CLINIC | Age: 56
End: 2021-08-04
Payer: MEDICAID

## 2021-08-04 VITALS
RESPIRATION RATE: 17 BRPM | BODY MASS INDEX: 22.18 KG/M2 | HEART RATE: 66 BPM | SYSTOLIC BLOOD PRESSURE: 106 MMHG | DIASTOLIC BLOOD PRESSURE: 68 MMHG | TEMPERATURE: 94.2 F | WEIGHT: 109.79 LBS | OXYGEN SATURATION: 100 %

## 2021-08-04 DIAGNOSIS — M25.561 PAIN IN RIGHT KNEE: ICD-10-CM

## 2021-08-04 DIAGNOSIS — M75.02 ADHESIVE CAPSULITIS OF LEFT SHOULDER: ICD-10-CM

## 2021-08-04 DIAGNOSIS — M25.562 PAIN IN RIGHT KNEE: ICD-10-CM

## 2021-08-04 PROCEDURE — 99214 OFFICE O/P EST MOD 30 MIN: CPT

## 2021-08-04 NOTE — PHYSICAL EXAM
[FreeTextEntry1] : GEN: AAOx3, NAD.\par PSYCH: Normal mood and affect. Responds appropriately to commands.\par EYES: Sclerae Anicteric. No discharge. EOMI.\par RESP: Breathing unlabored.\par CV: DP pulses 2+ and equal. No varicosities noted.\par ABD: distended \par EXT: No C/C/E.\par SKIN: No lesions noted.\par STRENGTH: 5-/5 all extremities \par SENS: Grossly intact to light touch bilateral lower extremities.  \par GAIT: Non antalgic, normal reciprocating heel to toe\par LUMBAR ROM: Flexion limited by abdominal discomfort \par SHOULDER: limited ROM b/l shoulder abduction, + TTP L upper back\par PALP: There is no tenderness over the midline spinous processes, paravertebral muscles, SIJ, or greater trochanters bilaterally.  \par Knee pain: mild medial tenderness b/l, + crepitus, no edema or laxity noted. no erythema b/l\par . \par

## 2021-08-04 NOTE — HISTORY OF PRESENT ILLNESS
[FreeTextEntry1] : 57 yo F pmh dm2, CTS, ampullary carcinoma (adenocarcinoma) dx in Dec 2018, s/p whipple pancreaticoduodenectomy for duodenal adenocarcinoma in 2019. course complicated by hemorrhagic shock, evacuation of hematoma, pressors, icu stay, ex lap. She was discharged home 3/1/2019 and received Gemzar/Xeloda, CT 10/2019 showed no evidence of metastatic disease. Patient had repeat CT CAP in 11/2020 which showed 2 enlarged mesentaric lymph nodes. Repeat CT done 5/2020 revealed unchanged cardiophrenic and mesenteric root lymph nodes, slightly increased in size, recommended for follow up to assess for change. \par  Per chart could not tolerate opioids. Patient presents today to PM&R for follow up, initially referred for abdominal pain and was started on gabapentin however reports symptoms unchanged. She was also reporting constipation however did not increase medications for constipation.  She also reports knee pain and x rays were ordered last visit but she was waiting for confirmation if she needs insurance authorization. She is accompanied by her daughter today. \par Patient describes pain in the abdomen burning pain worse with movement.  She states pain is also generalized "all over".  She also has limited shoulder ROM and difficulty with sleep.\par  \par \par She lives in a private home with 8-10 stairs to enter, lives with her daughter \par

## 2021-08-04 NOTE — ASSESSMENT
[FreeTextEntry1] : 55 yo F pmh ampullary carcinoma (adenocarcinoma) dx in Dec 2018, s/p whipple pancreaticoduodenectomy for duodenal adenocarcinoma in 2019, with burning abdominal pain s/p whipple\par -also reports constipation- recommended dulcolax suppostiory\par -on gabapentin for neuropathic abdominal pain, no relief however will try higher dose, if no effect can gradually taper off.\par -f/u knee x rays, PT ordered if abdominal pain does not interfere\par -patient following up with general surgery for possible hernia repair \par -hx of L shoulder adhesive capsulitis, improved with steroid injection per patient, still limited rom b/l\par -follow up in 4-6 weeks\par \par

## 2021-08-05 ENCOUNTER — APPOINTMENT (OUTPATIENT)
Dept: GASTROENTEROLOGY | Facility: CLINIC | Age: 56
End: 2021-08-05
Payer: MEDICAID

## 2021-08-05 ENCOUNTER — RESULT REVIEW (OUTPATIENT)
Age: 56
End: 2021-08-05

## 2021-08-05 ENCOUNTER — NON-APPOINTMENT (OUTPATIENT)
Age: 56
End: 2021-08-05

## 2021-08-05 ENCOUNTER — OUTPATIENT (OUTPATIENT)
Dept: OUTPATIENT SERVICES | Facility: HOSPITAL | Age: 56
LOS: 1 days | End: 2021-08-05

## 2021-08-05 VITALS — TEMPERATURE: 97.2 F

## 2021-08-05 VITALS
OXYGEN SATURATION: 99 % | WEIGHT: 109 LBS | RESPIRATION RATE: 16 BRPM | HEART RATE: 72 BPM | DIASTOLIC BLOOD PRESSURE: 72 MMHG | BODY MASS INDEX: 21.97 KG/M2 | SYSTOLIC BLOOD PRESSURE: 110 MMHG | HEIGHT: 59 IN

## 2021-08-05 DIAGNOSIS — D64.9 ANEMIA, UNSPECIFIED: ICD-10-CM

## 2021-08-05 DIAGNOSIS — R74.01 ELEVATION OF LEVELS OF LIVER TRANSAMINASE LEVELS: ICD-10-CM

## 2021-08-05 DIAGNOSIS — Z12.10 ENCOUNTER FOR SCREENING FOR MALIGNANT NEOPLASM OF INTESTINAL TRACT, UNSPECIFIED: ICD-10-CM

## 2021-08-05 LAB
A1C WITH ESTIMATED AVERAGE GLUCOSE RESULT: 8.4 % — HIGH (ref 4–5.6)
ESTIMATED AVERAGE GLUCOSE: 194 — SIGNIFICANT CHANGE UP
FERRITIN SERPL-MCNC: 148 NG/ML — SIGNIFICANT CHANGE UP (ref 15–150)
GGT SERPL-CCNC: 236 U/L — HIGH (ref 5–36)
IRON SATN MFR SERPL: 61 UG/DL — SIGNIFICANT CHANGE UP (ref 30–160)

## 2021-08-05 PROCEDURE — 99213 OFFICE O/P EST LOW 20 MIN: CPT | Mod: GC

## 2021-08-06 NOTE — REVIEW OF SYSTEMS
[As Noted in HPI] : as noted in HPI [Negative] : Neurological [Fever] : no fever [Chills] : no chills [Recent Weight Loss (___ Lbs)] : no recent weight loss [Eye Pain] : no eye pain [Eyesight Problems] : no eyesight problems [Earache] : no earache [Nosebleeds] : no nosebleeds [Chest Pain] : no chest pain [Palpitations] : no palpitations [Skin Lesions] : no skin lesions [Skin Wound] : no skin wound [Confused] : no confusion [Suicidal] : not suicidal [Anxiety] : no anxiety [Depression] : no depression [Easy Bleeding] : no tendency for easy bleeding [Easy Bruising] : no tendency for easy bruising [Swollen Glands] : no swollen glands

## 2021-08-06 NOTE — PHYSICAL EXAM
[General Appearance - Alert] : alert [General Appearance - In No Acute Distress] : in no acute distress [Extraocular Movements] : extraocular movements were intact [Hearing Threshold Finger Rub Not [place]] : hearing was normal [Neck Appearance] : the appearance of the neck was normal [Heart Rate And Rhythm] : heart rate was normal and rhythm regular [Heart Sounds] : normal S1 and S2 [Edema] : there was no peripheral edema [Bowel Sounds] : normal bowel sounds [Abdomen Soft] : soft [] : no rash [Skin Lesions] : no skin lesions [Oriented To Time, Place, And Person] : oriented to person, place, and time [Impaired Insight] : insight and judgment were intact [Affect] : the affect was normal [FreeTextEntry1] : +well healed surgical scars; +diffusely tender, +rebound tenderness; +large tender and reducible ventral hernia

## 2021-08-06 NOTE — HISTORY OF PRESENT ILLNESS
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 Whipple procedure c/b large abdominal incisional hernia. Was last seen in GI clinic 10/2019. Presents today with chronic constant diffuse abdominal pain since 2019; was referred for evaluation by Dr. Katty Navarro (pt's oncologist). Worse with movement especially from sitting to standing or standing to sitting; has occasional reflux/heartburn and bloating. No recent abx use. Pain improves with abdominal girdle use, which she uses consistently. Pain not worse with food, no weight loss, night sweats, dysphagia or odynophagia. Tolerating PO, no changes in appetite. no N/V. Has chronic constipation, tx'ed with miralax, dulcolax and senna; has BM daily but hard stools. No BRBPR or melena.\par \par Was seen by surgery 05/2021 for evaluation of chronic abdominal pain thought likely 2/2 incisional hernia. At that time there was a conversation that this would be a major undertaking w/ abdominal wall reconstruction and placement of prosthetic mesh and that plastic surgery may need to be involved for possible component separation. Pt and her daughter were told to discuss with her family and other medical doctors about risk vs benefits of surgery. Had CT A/P w PO + IV cont 05/2021 showing widemouth ventral hernia but no bowel obstruction, hepaticojejunostomy and gastrojejunostomy.\par \par h/o colonoscopy [id number] at OSH. No family h/o malignancy. No ETOH or tobacco use. Has not been vaccinated for COVID19.

## 2021-08-06 NOTE — ASSESSMENT
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 Was last seen in GI clinic 10/2019. Presents today with chronic constant diffuse abdominal pain since 2019; was referred for evaluation by Dr. Katty Navarro (pt's oncologist). \par \par \par #chronic constant diffuse abdominal pain x 2 years- worse with movement from sitting <-> standing ever since Whipple operation\par #large incisional tender reducible ventral midline hernia s/p Whipple in 2019\par No N/V, but abdominal pain worse with movements. Seen by surgery in 05/2021 with discussion of possible surgical repair. 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 Still follows with oncology. Normal CEA and CA-19-9 in 05/2021.\par \par #chronic constipation\par #encounter for colon cancer screening\par #HCV Ab screening- was negative in 2019\par #microcytic anemia Hgb 11, MCV 75\par #elevated AST 53 and Alk phos 125\par #T2DM- last A1c 7.5 in 01/2020\par \par Other recommendations:\par - continue Miralax for constipation \par - recommend stopping senna/Dulcolax as this may cause abdominal cramping and discomfort and use only Miralax in sufficient quantities\par - ordered A1c, GGT, iron and ferritin\par - order bowel prep\par - ordered colonoscopy\par - provided resources on COVID vaccine locations\par \par Pt seen and d/w Dr. Simpson.\par \par Usha Alatorre MD\par GI Fellow, PGY-4

## 2021-08-06 NOTE — END OF VISIT
[] : Fellow [FreeTextEntry3] : As modified and discussed with patient\par MD EUGENE Barkley [facility]\par Associate Professor of Medicine\par [facility] [employer]\par

## 2021-08-09 DIAGNOSIS — C24.1 MALIGNANT NEOPLASM OF AMPULLA OF VATER: ICD-10-CM

## 2021-08-09 DIAGNOSIS — Z12.10 ENCOUNTER FOR SCREENING FOR MALIGNANT NEOPLASM OF INTESTINAL TRACT, UNSPECIFIED: ICD-10-CM

## 2021-08-09 DIAGNOSIS — R10.9 UNSPECIFIED ABDOMINAL PAIN: ICD-10-CM

## 2021-08-09 DIAGNOSIS — D64.9 ANEMIA, UNSPECIFIED: ICD-10-CM

## 2021-08-09 DIAGNOSIS — R74.01 ELEVATION OF LEVELS OF LIVER TRANSAMINASE LEVELS: ICD-10-CM

## 2021-08-19 ENCOUNTER — OUTPATIENT (OUTPATIENT)
Dept: OUTPATIENT SERVICES | Facility: HOSPITAL | Age: 56
LOS: 1 days | Discharge: HOME | End: 2021-08-19

## 2021-08-19 DIAGNOSIS — C24.1 MALIGNANT NEOPLASM OF AMPULLA OF VATER: ICD-10-CM

## 2021-08-23 ENCOUNTER — LABORATORY RESULT (OUTPATIENT)
Age: 56
End: 2021-08-23

## 2021-08-23 ENCOUNTER — RESULT REVIEW (OUTPATIENT)
Age: 56
End: 2021-08-23

## 2021-08-23 ENCOUNTER — APPOINTMENT (OUTPATIENT)
Dept: INFUSION THERAPY | Facility: HOSPITAL | Age: 56
End: 2021-08-23

## 2021-08-23 ENCOUNTER — APPOINTMENT (OUTPATIENT)
Dept: HEMATOLOGY ONCOLOGY | Facility: CLINIC | Age: 56
End: 2021-08-23
Payer: MEDICAID

## 2021-08-23 VITALS
BODY MASS INDEX: 21.82 KG/M2 | TEMPERATURE: 98 F | WEIGHT: 108.03 LBS | SYSTOLIC BLOOD PRESSURE: 134 MMHG | HEART RATE: 74 BPM | OXYGEN SATURATION: 99 % | RESPIRATION RATE: 16 BRPM | DIASTOLIC BLOOD PRESSURE: 76 MMHG

## 2021-08-23 LAB
BASOPHILS # BLD AUTO: 0.03 K/UL — SIGNIFICANT CHANGE UP (ref 0–0.2)
BASOPHILS NFR BLD AUTO: 0.5 % — SIGNIFICANT CHANGE UP (ref 0–2)
EOSINOPHIL # BLD AUTO: 0.2 K/UL — SIGNIFICANT CHANGE UP (ref 0–0.5)
EOSINOPHIL NFR BLD AUTO: 3.1 % — SIGNIFICANT CHANGE UP (ref 0–6)
HCT VFR BLD CALC: 34 % — LOW (ref 34.5–45)
HGB BLD-MCNC: 11.2 G/DL — LOW (ref 11.5–15.5)
IMM GRANULOCYTES NFR BLD AUTO: 0.3 % — SIGNIFICANT CHANGE UP (ref 0–1.5)
LYMPHOCYTES # BLD AUTO: 2.42 K/UL — SIGNIFICANT CHANGE UP (ref 1–3.3)
LYMPHOCYTES # BLD AUTO: 37.6 % — SIGNIFICANT CHANGE UP (ref 13–44)
MCHC RBC-ENTMCNC: 24.6 PG — LOW (ref 27–34)
MCHC RBC-ENTMCNC: 32.9 G/DL — SIGNIFICANT CHANGE UP (ref 32–36)
MCV RBC AUTO: 74.7 FL — LOW (ref 80–100)
MONOCYTES # BLD AUTO: 0.59 K/UL — SIGNIFICANT CHANGE UP (ref 0–0.9)
MONOCYTES NFR BLD AUTO: 9.2 % — SIGNIFICANT CHANGE UP (ref 2–14)
NEUTROPHILS # BLD AUTO: 3.17 K/UL — SIGNIFICANT CHANGE UP (ref 1.8–7.4)
NEUTROPHILS NFR BLD AUTO: 49.3 % — SIGNIFICANT CHANGE UP (ref 43–77)
NRBC # BLD: 0 /100 WBCS — SIGNIFICANT CHANGE UP (ref 0–0)
PLATELET # BLD AUTO: 118 K/UL — LOW (ref 150–400)
RBC # BLD: 4.55 M/UL — SIGNIFICANT CHANGE UP (ref 3.8–5.2)
RBC # FLD: 14.2 % — SIGNIFICANT CHANGE UP (ref 10.3–14.5)
WBC # BLD: 6.43 K/UL — SIGNIFICANT CHANGE UP (ref 3.8–10.5)
WBC # FLD AUTO: 6.43 K/UL — SIGNIFICANT CHANGE UP (ref 3.8–10.5)

## 2021-08-23 PROCEDURE — 99214 OFFICE O/P EST MOD 30 MIN: CPT

## 2021-08-24 RX ORDER — GABAPENTIN 100 MG/1
100 CAPSULE ORAL
Qty: 90 | Refills: 1 | Status: COMPLETED | COMMUNITY
Start: 2021-06-04 | End: 2021-08-24

## 2021-08-25 NOTE — HISTORY OF PRESENT ILLNESS
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 \par \par Initially was admitted to [facility] on 12/18/18 with dizziness, found to have elevated LFTs. MRCP demonstrated a dilatation of the CBD. ERCP on 12/22/18 performed c/w ampullary mass with obstruction. Pathology c/w adenocarcinoma. Pt was discharged for out pt follow up with surg onc and med onc but was re-admitted on 1/5/19 with n/v, worsening abdominal pain, found to have severe sepsis with E coli bacteremia 2/2 acute cholangitis. \par \par 1/5/19: CT A/P Heterogeneity of the right hepatic lobe with questionable 1.3 cm hypoattenuating lesion in segment 7.\par 1/7/19: ERCP performed. Stent was removed from the biliary tree. One plastic stent placed\par 1/8/19: MRI Abd: No suspicious liver lesions. No abnormality is identified to correspond with questionable lesion identified on prior CT. 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  Taken to the OR for evacuation of hematoma, cessation of gastroduodenal artery bleeding with two hemostatic stitches, and placement of abthera VAC.  Required pressors in the SICU, then developed fever. \par 1/21-1/22/19: OR exploratory laparotomy, drainage of abdominal abscess, repair of abdominal wall hernia with Strattice mesh, b/ abdominal wall advancement flaps. Post-op patient remained intubated and off vasopressors. FENA calculated to be pre-renal. 500 CC albumin bolus given, urine output improved. 1/22/19 attempted spontaneous breathing trials but pt hypoxic. Pt lactate uptrend, resuscitated w/IVF, and pt hypotensive requiring pressors. UCx growing candida, Diflucan changed to micafungin to r/o resistant organisms in the setting of worsening sepsis. \par 1/23/19: patient noted to have blood oozing from midline incision while hypotensive and on pressors. Peak pressures noted to be elevated on vent and abdomen increasingly distended. All staples removed and a large amount of clot evacuated from midline incision. 1U PRBC given. No active bleeding appreciated. Fascia remains closed. Wound packed with wet to dry dressing. Vitamin K given for elevated INR. \par 1/24-1/29/19: remained in SICU, started on TPN, diuresed, finally extubated. \par 2/1/19: septic shock again, pressors restarted\par 2/2-2/11/19: CT scan with peritonitis, new abscesses. Ab and Antifungals started. Wound with increase in drainage. Octreotide started for increased output \par 2/12/19: drainage of abscesses by IR.  \par 2/18/19: Wound was partially closed by plastics with interrupted sutures and an ostomy appliance was placed, VAC removed.\par 3/1/19. d/c home \par 4/30-10/21/19: s/p 8 cycles Gemzar/Xeloda chemotherapy\par 10/10/19: CT CAP: no evidence of mets\par 1/20-11/2020: prolonged stay in [facility] due to COVID \par 11/6/20: CT CAP: 2 mildly enlarged mesenteric LN, attention in f/u\par 5/19/21: CT CAP revealed stable exam, unchanged cardiophrenic and mesenteric root lymph nodes [de-identified] : adenocarcinoma [de-identified] : CA [id number] [de-identified] : FINAL PATHOLOGY:\par -Invasive adenocarcinoma of the duodenal ampulla (Tumor size: 2.5 x 1.5 x 0.8 cm); Tumor stage: pT3b pN1\par -Vascular and perineural invasion identified\par -1/24 lymph nodes involved by adenocarcinoma\par -Negative margins\par -S/p cholecystectomy ; acute cholecystitis with cholelithiasis \par -Bile duct margin with acute inflammation and focal atypia consistent with low grade dysplasia. \par  [FreeTextEntry1] : surveillance [de-identified] : Patient's generalized abdominal pain/discomfort remains persistent since her last office visit. She was recently seen by gastroenterology (for repeat colonoscopy), physiatry (increased gabapentin dosage to 300 mg twice daily) and PCP (diabetes management). She also reported occasional abdominal cramping sensation, which is believed to be related with her constipation regimen (advised to stop Senna/Dulcolax and continue MiraLAX only at this time). Patient admitted to stable weight, makes a conscious effort to eat throughout and able to complete self-care ADLs throughout the day but gets tired easily and feels restricted with her physical activities as a result. \par

## 2021-10-09 DIAGNOSIS — Z01.818 ENCOUNTER FOR OTHER PREPROCEDURAL EXAMINATION: ICD-10-CM

## 2021-10-10 ENCOUNTER — APPOINTMENT (OUTPATIENT)
Dept: DISASTER EMERGENCY | Facility: CLINIC | Age: 56
End: 2021-10-10

## 2021-10-11 LAB — SARS-COV-2 N GENE NPH QL NAA+PROBE: NOT DETECTED

## 2021-10-12 NOTE — ASU PATIENT PROFILE, ADULT - NSICDXPASTMEDICALHX_GEN_ALL_CORE_FT
PAST MEDICAL HISTORY:  Adenocarcinoma of gallbladder     Arthritis     Essential hypertension     Gastroesophageal reflux disease without esophagitis     Mild intermittent asthma without complication     Neuropathy     Type 2 diabetes mellitus without complication, without long-term current use of insulin

## 2021-10-13 ENCOUNTER — RESULT REVIEW (OUTPATIENT)
Age: 56
End: 2021-10-13

## 2021-10-13 ENCOUNTER — OUTPATIENT (OUTPATIENT)
Dept: OUTPATIENT SERVICES | Facility: HOSPITAL | Age: 56
LOS: 1 days | Discharge: HOME | End: 2021-10-13
Payer: MEDICAID

## 2021-10-13 VITALS
HEIGHT: 59 IN | OXYGEN SATURATION: 98 % | SYSTOLIC BLOOD PRESSURE: 131 MMHG | RESPIRATION RATE: 14 BRPM | TEMPERATURE: 98 F | HEART RATE: 63 BPM | DIASTOLIC BLOOD PRESSURE: 68 MMHG | WEIGHT: 108.91 LBS

## 2021-10-13 VITALS — SYSTOLIC BLOOD PRESSURE: 126 MMHG | HEART RATE: 63 BPM | DIASTOLIC BLOOD PRESSURE: 58 MMHG | RESPIRATION RATE: 20 BRPM

## 2021-10-13 DIAGNOSIS — Z12.10 ENCOUNTER FOR SCREENING FOR MALIGNANT NEOPLASM OF INTESTINAL TRACT, UNSPECIFIED: ICD-10-CM

## 2021-10-13 LAB
GLUCOSE BLDC GLUCOMTR-MCNC: 141 MG/DL — HIGH (ref 70–99)
GLUCOSE BLDC GLUCOMTR-MCNC: 148 MG/DL — HIGH (ref 70–99)

## 2021-10-13 PROCEDURE — 45380 COLONOSCOPY AND BIOPSY: CPT | Mod: GC,59

## 2021-10-13 PROCEDURE — 88305 TISSUE EXAM BY PATHOLOGIST: CPT | Mod: 26

## 2021-10-13 PROCEDURE — 45385 COLONOSCOPY W/LESION REMOVAL: CPT | Mod: GC

## 2021-10-15 LAB — SURGICAL PATHOLOGY STUDY: SIGNIFICANT CHANGE UP

## 2021-11-11 ENCOUNTER — OUTPATIENT (OUTPATIENT)
Dept: OUTPATIENT SERVICES | Facility: HOSPITAL | Age: 56
LOS: 1 days | Discharge: HOME | End: 2021-11-11

## 2021-11-11 DIAGNOSIS — C24.1 MALIGNANT NEOPLASM OF AMPULLA OF VATER: ICD-10-CM

## 2021-11-15 ENCOUNTER — RESULT REVIEW (OUTPATIENT)
Age: 56
End: 2021-11-15

## 2021-11-15 ENCOUNTER — LABORATORY RESULT (OUTPATIENT)
Age: 56
End: 2021-11-15

## 2021-11-15 ENCOUNTER — APPOINTMENT (OUTPATIENT)
Dept: INFUSION THERAPY | Facility: HOSPITAL | Age: 56
End: 2021-11-15

## 2021-11-15 ENCOUNTER — APPOINTMENT (OUTPATIENT)
Dept: HEMATOLOGY ONCOLOGY | Facility: CLINIC | Age: 56
End: 2021-11-15
Payer: MEDICAID

## 2021-11-15 VITALS
SYSTOLIC BLOOD PRESSURE: 106 MMHG | HEART RATE: 68 BPM | DIASTOLIC BLOOD PRESSURE: 68 MMHG | WEIGHT: 110.23 LBS | TEMPERATURE: 98 F | RESPIRATION RATE: 16 BRPM | OXYGEN SATURATION: 98 % | BODY MASS INDEX: 22.26 KG/M2

## 2021-11-15 LAB
BASOPHILS # BLD AUTO: 0.04 K/UL — SIGNIFICANT CHANGE UP (ref 0–0.2)
BASOPHILS NFR BLD AUTO: 0.5 % — SIGNIFICANT CHANGE UP (ref 0–2)
EOSINOPHIL # BLD AUTO: 0.14 K/UL — SIGNIFICANT CHANGE UP (ref 0–0.5)
EOSINOPHIL NFR BLD AUTO: 1.7 % — SIGNIFICANT CHANGE UP (ref 0–6)
HCT VFR BLD CALC: 35.9 % — SIGNIFICANT CHANGE UP (ref 34.5–45)
HGB BLD-MCNC: 11.5 G/DL — SIGNIFICANT CHANGE UP (ref 11.5–15.5)
IMM GRANULOCYTES NFR BLD AUTO: 0.2 % — SIGNIFICANT CHANGE UP (ref 0–1.5)
LYMPHOCYTES # BLD AUTO: 3.03 K/UL — SIGNIFICANT CHANGE UP (ref 1–3.3)
LYMPHOCYTES # BLD AUTO: 36.9 % — SIGNIFICANT CHANGE UP (ref 13–44)
MCHC RBC-ENTMCNC: 24.3 PG — LOW (ref 27–34)
MCHC RBC-ENTMCNC: 32 G/DL — SIGNIFICANT CHANGE UP (ref 32–36)
MCV RBC AUTO: 75.7 FL — LOW (ref 80–100)
MONOCYTES # BLD AUTO: 0.96 K/UL — HIGH (ref 0–0.9)
MONOCYTES NFR BLD AUTO: 11.7 % — SIGNIFICANT CHANGE UP (ref 2–14)
NEUTROPHILS # BLD AUTO: 4.03 K/UL — SIGNIFICANT CHANGE UP (ref 1.8–7.4)
NEUTROPHILS NFR BLD AUTO: 49 % — SIGNIFICANT CHANGE UP (ref 43–77)
NRBC # BLD: 0 /100 WBCS — SIGNIFICANT CHANGE UP (ref 0–0)
PLATELET # BLD AUTO: 139 K/UL — LOW (ref 150–400)
RBC # BLD: 4.74 M/UL — SIGNIFICANT CHANGE UP (ref 3.8–5.2)
RBC # FLD: 14.5 % — SIGNIFICANT CHANGE UP (ref 10.3–14.5)
WBC # BLD: 8.22 K/UL — SIGNIFICANT CHANGE UP (ref 3.8–10.5)
WBC # FLD AUTO: 8.22 K/UL — SIGNIFICANT CHANGE UP (ref 3.8–10.5)

## 2021-11-15 PROCEDURE — 99214 OFFICE O/P EST MOD 30 MIN: CPT

## 2021-11-15 RX ORDER — POLYETHYLENE GLYCOL 3350 AND ELECTROLYTES WITH LEMON FLAVOR 236; 22.74; 6.74; 5.86; 2.97 G/4L; G/4L; G/4L; G/4L; G/4L
236 POWDER, FOR SOLUTION ORAL
Qty: 1 | Refills: 0 | Status: DISCONTINUED | COMMUNITY
Start: 2021-08-05 | End: 2021-11-15

## 2021-11-16 LAB
APTT BLD: 35.4 SEC
INR PPP: 1.33 RATIO
PT BLD: 15.5 SEC

## 2021-12-07 ENCOUNTER — APPOINTMENT (OUTPATIENT)
Dept: CT IMAGING | Facility: IMAGING CENTER | Age: 56
End: 2021-12-07
Payer: MEDICAID

## 2021-12-07 ENCOUNTER — APPOINTMENT (OUTPATIENT)
Dept: SURGICAL ONCOLOGY | Facility: CLINIC | Age: 56
End: 2021-12-07
Payer: MEDICAID

## 2021-12-07 ENCOUNTER — OUTPATIENT (OUTPATIENT)
Dept: OUTPATIENT SERVICES | Facility: HOSPITAL | Age: 56
LOS: 1 days | End: 2021-12-07
Payer: MEDICAID

## 2021-12-07 VITALS
WEIGHT: 111 LBS | SYSTOLIC BLOOD PRESSURE: 108 MMHG | HEIGHT: 60 IN | DIASTOLIC BLOOD PRESSURE: 70 MMHG | HEART RATE: 70 BPM | TEMPERATURE: 96.1 F | BODY MASS INDEX: 21.79 KG/M2 | RESPIRATION RATE: 16 BRPM

## 2021-12-07 DIAGNOSIS — C24.1 MALIGNANT NEOPLASM OF AMPULLA OF VATER: ICD-10-CM

## 2021-12-07 DIAGNOSIS — Z00.8 ENCOUNTER FOR OTHER GENERAL EXAMINATION: ICD-10-CM

## 2021-12-07 DIAGNOSIS — K43.2 INCISIONAL HERNIA W/OUT OBSTRUCTION OR GANGRENE: ICD-10-CM

## 2021-12-07 PROCEDURE — 71260 CT THORAX DX C+: CPT | Mod: 26

## 2021-12-07 PROCEDURE — 74177 CT ABD & PELVIS W/CONTRAST: CPT

## 2021-12-07 PROCEDURE — 99215 OFFICE O/P EST HI 40 MIN: CPT

## 2021-12-07 PROCEDURE — 74177 CT ABD & PELVIS W/CONTRAST: CPT | Mod: 26

## 2021-12-07 PROCEDURE — 71260 CT THORAX DX C+: CPT

## 2021-12-09 ENCOUNTER — APPOINTMENT (OUTPATIENT)
Dept: CT IMAGING | Facility: IMAGING CENTER | Age: 56
End: 2021-12-09

## 2021-12-09 PROBLEM — K43.2 INCISIONAL HERNIA, WITHOUT OBSTRUCTION OR GANGRENE: Status: ACTIVE | Noted: 2021-01-08

## 2021-12-09 NOTE — PHYSICAL EXAM
[Normal] : supple, no neck mass and thyroid not enlarged [Normal] : oriented to person, place and time, with appropriate affect [de-identified] : soft, NT, ND;  well healed incisions; large reducible abdominal hernia (wearing abdominal binder)

## 2021-12-09 NOTE — HISTORY OF PRESENT ILLNESS
[de-identified] : 56 year old female presents for a follow up visit.   \par \par - HOSPITAL COURSE- Hospitalized at [facility] from 1/5/19-3/5/19\par 53 y.o. 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 Patient had acute onset of RUQ pain yesterday, with no inciting factors or relief with aleve. The patient's family also endorsed 1-2 episodes of NBNB emesis yesterday and has had decreased PO intake over the last 36 hours. As of diagnosis of cancer, the patient has not had follow up with oncology or surg-oncology due insurance issues. The patient has an appointment with [facility] on Wednesday for surgical evaluation. In the ED, patient febrile to 101.5, HR 98, BPs decreased to 98/41, found with leukocytosis to 15.2, lactate 4.4. She was found with RUQ pain, fever, and jaundice (Bilirubin 3.9) consistent with acute cholangitis. CT A/P revealed Hyperattenuation and mild wall thickening involving the CBD raises the possibility of cholangitis with pneumobilia related to recent ERCP. In the ED, patient received IVF, vanc/zosyn, morphine, and zofran. Patient admitted with severe sepsis 2/2 acute cholangitis. MICU was consulted for hypotension, but this improved s/p fluids and IV antibiotics and patient did not warrant MICU level care. GI was consulted and recommended ERCP. The patient was maintained on zosyn for IV antibiotics. Patient remained stable on zosyn, white count trended down, lactate improved, and fevers were resolving. BCs ultimately grew E.Coli. 1/7: ERCP performed. Stent was removed from the biliary tree. One plastic stent was placed into the common bile duct. The major papilla appeared to have a mass.Oncology was consulted given patient's poor outpatient oncology follow up due to insurance issues, they recommended CT scan and  MRI abdomen/pelvis with contrast for continued staging of cancer workup. \par \par 1/8/19 CT A/P - showed Small bilateral pleural effusions with bibasilar atelectasis. No lung nodules. Mildly enlarged right cardiophrenic angle lymph node is unchanged since December 18, 2018 is indeterminate\par \par 1/8/19  MRI-  IMPRESSION: *  No suspicious liver lesions. No abnormality is identified to correspond with questionable lesion identified on prior CT. * Intra and extrahepatic biliary ductal dilation, with mural enhancement which may be seen in the setting of cholangitis. *  Cholelithiasis with gallbladder wall thickening.Pt  transferred to surgical oncology service and went to the OR on 1/11/19. \par \par  1/11/19- S/p ex-lap, Whipple pancreaticoduodenectomy for duodenal adenocarcinoma. \par \par FINAL PATHOLOGY:\par -Invasive adenocarcinoma of the duodenal ampulla (Tumor size: 2.5 x 1.5 x 0.8 cm); Tumor stage: pT3b pN1\par -Vascular and perineural invasion identified\par -1/24 lymph nodes involved by adenocarcinoma\par -Negative margins\par -S/p cholecystectomy ; acute cholecystitis with cholelithiasis \par -Bile duct margin with acute inflammation and focal atypia consistent with low grade dysplasia. \par \par Intraop findings: pylorus sparing Whipple.  Pt tolerated procedure well, without complication.\par \par On 1/18/19, patient was a surgical rapid response for which pt transferred to SICU. Patient was febrile to 102. Cultures sent. Antibiotics changed from zosyn to meropenem. Patient was tachypneic and hypotensive to 70s systolic. CBC revealed drop in H/H to 6/19 with elevated lactate of 9. Decision was made to intubate patient. Massive Transfusion protocol initiated. Patient received 4PRBC 5FFP and 1platelet, and bolus 1L of fluid. Surgical team and attending notified when Peak pressures on vent were 50s and increased abdominal distention. Decision was then made to take patient emergently to OR. Patient had evacuation of hematoma, cessation of gastroduodenal artery bleeding with two hemostatic stitches, and placement of abthera VAC. Pt transferred to SICU post op. Patient hypotensive post-op with low UOP. 2 L bolus given.  CBC remained stable. Patient hypotensive to 80s systolic, [place] started and increased, patient was then transitioned to levo. Lactate downtrended to 7.6 and H/H stable. \par \par On 1/19 pt cont to require pressors, resuscitated with IVF, and had drop H/H for which transfused. On 1/21/19, Pt febrile to 100.6 overnight, cultures sent, UA+ nitrite, follow up urine cultures. \par \par Pt RTOR on 1/21/19 for Exploratory laparotomy, drainage of abdominal abscess, repair of abdominal wall hernia with Strattice mesh, b/ abdominal wall advancement flaps. Post-op patient remained intubated and  off vasopressors. FENA calculated to be pre-renal. 500CC albumin bolus given, urine output improved. 1/22/19 attempted spontaneous breathing trials but pt hypoxic. Pt lactate uptrending, resuscitated w/IVF, and pt hypotensive requiring pressors. UCx growing candida, Diflucan changed to micafungin to r/o resistant organisms in the setting of worsening sepsis. On 1/23/19, patient noted to have blood oozing from \par midline incision while hypotensive and on pressors.  Peak pressures noted to be elevated on vent and abdomen increasingly distended.  All staples removed and a large amount of clot evacuated from midline incision. 1U PRBC given. No active bleeding appreciated.  Fascia remains closed.  Wound packed with wet to dry dressing.  Vitamin K given for elevated INR.  1/24/19 nutrition consulted and pt. started on TPN. Plastic consulted bc midline opened for hematoma evacuation yesterday, external tissue expander device (Dermaclose) applied to the wound. Patient hypotensive requiring reinitiation of low dose vasopressors. On 1/25/19, sedatives and pressors were decreased. Pt was volume overloaded and was given Albumin, Lasix, and a Bumex drip. O/N, pt febrile 102, given tylenol. mildly hypotensive SBP 80, started on small dose levo. 1/27 Cont SBT. Decision to continue diuresis with Diamox 1/28 Patient febrile to 101 today. Diuresing with lasix/bumex, giving albumin. Continued SBTs.  On 1/29/19, patient tolerating CPAP. Patient extubated without issue. NGT was removed. Patient started on clears, continue TPN while caloric intake is still low. 1/30/19 CT scan performed and showed b/l moderate pleural effusions with associated atelectasis.  Partially \par imaged right lower lung patchy opacity may represent small infiltrate in the appropriate clinical setting. Moderate abdominopelvic ascites most prominent subjacent to the anterior  abdominal wall.  Left upper and lower quadrant collection measuring greater than fluid attenuation, which may reflect the presence of hemorrhagic products. Mild enhancement is seen in association with \par the anterior abdominal ascites, dependent pelvic ascites, and left mid abdominal collection, consistent with the presence of peritonitis. Findings may be postoperative or related to the presence of hemorrhagic products.Pt started on po labetalol for persistent HTN, however pt with hypotensive episode in PM shortly after administration. Patient given albumin. 2/1/19, persistently febrile, Tmax 101.3, hypotensive, tachycardic, and tachypneac. Albumin and IVF given, BP did not respond. Levophed restarted. JOURDAN X 2 started to drain copious amounts of bilious outpt. JOURDAN #1 700cc and JOURDAN #1 500cc. Given stat dose of vancomycin and \par meropenum.  2/2/19 CT scan performed and showed New diffuse peritoneal enhancement, compatible with peritonitis, more extensive compared to 1/30/2019. Multiple abdominal and pelvic fluid collections consistent with abscesses. New small bowel wall may also edema and colonic wall thickening, likely reactive. Left upper quadrant collection measuring greater than fluid attenuation is grossly unchanged from 1/30/2019 .On 2/3/19 she received 2 u prbc overnight, placed on BIPAP overnight,  vanc \par started overnight, start diflucan, vaso and levo started overnight, ABG and CBC, PM labs, A line placed overnight. Plan for IR drainage tomorrow. On 2/4/19: on pressors overnight, removed in AM. IR today for abscess drainage. advance tiger tube, NGT removed. repeat labs, remove a line after procedure. Increased serobilious wound drainage, BID dressing changes.2/5/19: diflucan to micafungin. decreased seroquil to 25. IV lock. start trickle feeds glucerna. 2/6: no further diuresis at this time, high bilious midline wound output, vac to be placed today by plastics. Sinha out. feeds to goal today.2/7: Central line and sinha removed, discontinued Seroquel, feeds changed to continuous 2/8: Octreotide started for high output from midline wound. Lasix with goal net negative 2L2/9: Resent blood cx and UA, bedside vac change by plastics today.2/11: Patient ordered for CLD w/ TF via NJT, upper and lower portion of abdominal wound closed by plastics, send left JOURDAN for amylase 2/12: CT A&P shows two collections... drainage by IR today. Drain was upsized on the right and left  collection 50cc of pus was drained.2/13: Stable for dispo to floor. 2/15: ID was consulted and recommended Meropenem and to continue Vancomycin for better source control. Patient was also seen by PM&R and they recommended FARIBA if patient is able to participate in care.2/18: Wound was partially closed by plastics with interrupted sutures and an ostomy appliance was placed, VAC removed.2/19 Antibiotics was changed to Meropenem and Zyvox, culture grew VRE.2/20: Patient had a tube check and the LUQ drain was removed. CT showed improvement in abdominal collections. Calorie count was performed. Patient is tolerating a regular diet with GLucerna shakes. Octreotide discontinued on 2/26.2/28: Tube check done and RLQ drain was removed.Patient will be discharged home. Patient will have VNS for wound care and PT.  She will be discharged on 1 more week of antibiotics (Cipro and Flagyl per ID).  Discharged home on 3/1/19. \par \par INTERVAL HISTORY:\par 3/12/19 -  Doing better.  Tolerating PO diet.  + GI fxn.  Pain well controlled.  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/day.\par \par Recent Labs 3/14/19:\par WBC:   9.4 K/uL\par HGB:  9.0 g/dL\par RBC:  3.07 M/uL\par HCT:  25.9%\par PLT:  298 K/uL\par \par Metabolic:\par Na:  141 mmol/L\par K:  3.8 mmol/L\par Cl:  101 mmol/L\par CO2:  27 mmol/L\par Anion Gap:  13 mmol/L\par Glucose:  139 mg/dL\par BUN:  7 mg/dL\par Creat:  0.38 mg/dL\par Total Protein:  7.3 g/dL\par Albumin:  3.4 g/dL\par Serum Ca:  9.3 mg/dL\par Total Bili:  0.8 mg/dL\par AST:  34 U/L\par ALT:  14 U/L\par ALK Phos:  382 U/L\par eGFR:  121 mL/min/1.73M2\par \par CA 19-9:  42 U/mL\par CEA:  2.0 ng/mL\par \par Hepatitis C:  Nonreactive\par Hepatitis B Core Ab:  Nonreactive\par Hepatitis B Surface Ag:  Nonreactive\par \par APTT:  31.8 sec\par PT:  13.2 sec\par INR:  1.16 ratio\par \par 3/19/19: Today, Ms. Lagunas reports occasional nausea, no vomiting, no pain.  Abdominal wall fistula drained last on Saturday nigt 8 cc.  Not drained since.  Daughter states color of drainage has changed from creamy to tan color.  Denies, fevers, chills.   During this visit, sutures removed from midline incision, as well as drain removed (ostomy appliance).  Wound well healing with granulation tissue in the periphery of wound bed with yellow slough noted in bottom of wound bed.  Wound packed with wet to dry dressing.  Instruction and demonstration provided to patient's daughter on changing wet to dry dressing, however, advised she may return to the ostomy appliance if she feels drainage is increasing.  She will return to office to see me in 2 weeks for follow up.  \par \par INTERVAL HISTORY:\par 4/2/19- Daughter continues to change midline wound with wet to dry gauze dressing daily.  The patient is with c/o 7-8 out of 10 abdominal pain with movement and activity, partially relieved with Oxycodone.  She denies fever or chills.  Denies nausea or vomiting. Having daily Bm's and passing flatus.  Appetite continues to improve. The daughter states she is eating an adequate amount of food.  She is scheduled to see Dr. Katty Navarro (Heme-onc) tomorrow. \par \par 4/21/19 - MRI Abdomen- No evidence of hepatic metastatic disease.\par \par 4/30/19 - Feeling well. Incisions well healed. Scheduled to start chemotherapy under the care of Dr. Katty Navarro.  Denies abdominal pain, nausea, vomiting or changes in bowel habits. \par \par 7/30/19 - She is currently on Xeloda/Gemzar under the care of Dr. Katty Navarro.  Xeloda recently on hold due to Grade 2-3 hand foot syndrome.  She continues to use Betamethasone cream for the hand/foot syndrome.   CT Abdomen performed on 5/22/19 showing stable findings. States her appetite continues to improve and her weight remains stable.  Tolerating PO intake without nausea or vomiting. Reports daily BM's and passing flatus.  Denies fever or chills.  Reports occasional lower left/right abdominal discomfort with movement however has relief with wearing an abdominal binder.  \par \par 12/17/19- She completed 6 months of adjuvant Gemzar/Xeloda under the care of Dr. Navarro.   The patient states she continues to have 9 out of 10 abdominal pain daily with walking and every movement.  She is also experiencing bloating and distention.  She wears an abdominal binder with mild relief.  She was seen by GI who recommended a PPI and Gas-x and the patient states she has minimal relief from both.  Appetite is improving and her weight remains stable.  Reports daily BM's and passing flatus.  Denies fever or chills.  BW 11/2019 -  (29 U/mL); CEA (2.0 ng/mL); LFT's WNL;   CT C/A/P performed on 10/10/19 with stable findings, no evidence of metastatic disease.  Diastasis of the anterior abdominal wall with non obstructed small bowel. \par \par 12/8/2020- Continues f/u with Dr. Katty Navarro and was last seen 11/2/2020.  Patient underwent a f/u CT C/A/P on 11/6/2020 showing an unchanged right cardiophrenic lymph node and 2 small indeterminate lymph nodes at the root of the mesentery which are slightly increased in size.  F/u is recommended to assess for change.   Blood work 11/2/2020 (CEA- 1.9 ng/mL;  - 39 U/mL).    Reports ongoing abdominal pain/discomfort with activity and movement.  She has not seen a hernia specialist.  States her appetite isn't as great since surgery.  Weight remains stable. Denies nausea, vomiting.  Denies changes in bowel habits.  No fever or chills. \par \par 6/8/2021- Pt. was seen by Dr. Veronica on 5/7/2021 with c/o worsening "burning" abdominal pain.  Pts pain is r/t moderate sized ventral hernia for which surgery was not recommended.  CT C/A/P performed on 5/19/2021 showed unchanged cardiophrenic and mesenteric root lymph nodes, widemouthed ventral hernia.  Pt. was referred to pain management and was seen by Dr. Leary on 6/2/2021.  She was started on Gabapentin for neuropathic abdominal pain and Oxy IR 5 mg daily PRN. Pt. may take NSAIDs PRN.  She states that she is still experiencing pain despite taking Gabapentin.  She is now with c/o generalized body pain, not just abdominal pain. Continues to wear abdominal binder with some relief.  Pt. also advised to take up to 3 senna daily for constipation.  She continues f/u with Dr. Navarro and was last seen 5/10/2021.  BW 5/2021- CEA (1.4)  (33).  \par \par 12/7/2021- Pt. continues to experience generalized abdominal pain/discomfort.  She had a colonoscopy on 10/13/2021 and was found to have 3 benign polyps (2 hyperplastic polyps and 1 tubular adenoma).  Remains on Gabapentin for neuropathic abdominal pain. Reports occasional constipation which is relieved with senna/colace. Continues to wear an abdominal binder.  Met with general surgeon?  Pt. continues f/u with Dr. Katty Navarro (Heme-onc).  BW 11/2021 (CEA- 1.6 ng/mL);  (40 U/mL).  Pt. is scheduled for a CT C/A/P today at 6pm. \par

## 2021-12-09 NOTE — ASSESSMENT
[FreeTextEntry1] : 56 year old woman s/p Whipple procedure for T3N1 (1/24 lymph nodes positive) ampullary adenocarcinoma, complicated by GDA bleed RTOR for oversewing of vessel, abdominal wall bleed requiring opening of the incision and secondary closure with Dermaclose, developed low output ECF.  Well healed incisions.\par \par 12/17/19- She completed 6 months of adjuvant Gemzar/Xeloda under the care of Dr. Navarro.   The patient states she continues to have 9 out of 10 abdominal pain daily with walking and every movement.  She is also experiencing bloating and distention.  She wears an abdominal binder with mild relief.  She was seen by GI who recommended a PPI and Gas-x and the patient states she has minimal relief from both.  Appetite is improving and her weight remains stable.  Reports daily BM's and passing flatus.  Denies fever or chills.  BW 11/2019 -  (29 U/mL); CEA (2.0 ng/mL); LFT's WNL;   CT C/A/P performed on 10/10/19 with stable findings, no evidence of metastatic disease.  Diastasis of the anterior abdominal wall with non obstructed small bowel. \par \par 12/8/2020- Continues f/u with Dr. Katty Navarro and was last seen 11/2/2020.  Patient underwent a f/u CT C/A/P on 11/6/2020 showing an unchanged right cardiophrenic lymph node and 2 small indeterminate lymph nodes at the root of the mesentery which are slightly increased in size.  F/u is recommended to assess for change.   Blood work 11/2/2020 (CEA- 1.9 ng/mL;  - 39 U/mL).    Reports ongoing abdominal pain/discomfort with activity and movement.  She has not seen a hernia specialist.  States her appetite isn't as great since surgery.  Weight remains stable. Denies nausea, vomiting.  Denies changes in bowel habits.  No fever or chills. \par \par 6/8/2021- Pt. was seen by Dr. Veroncia on 5/7/2021 with c/o worsening "burning" abdominal pain.  Pts pain is r/t moderate sized ventral hernia for which surgery was not recommended.  CT C/A/P performed on 5/19/2021 showed unchanged cardiophrenic and mesenteric root lymph nodes, widemouthed ventral hernia.  Pt. was referred to pain management and was seen by Dr. Leary on 6/2/2021.  She was started on Gabapentin for neuropathic abdominal pain and Oxy IR 5 mg daily PRN. Pt. may take NSAIDs PRN.  She states that she is still experiencing pain despite taking Gabapentin.  She is now with c/o generalized body pain, not just abdominal pain. Continues to wear abdominal binder with some relief.  Pt. also advised to take up to 3 senna daily for constipation.  She continues f/u with Dr. Navarro and was last seen 5/10/2021.  BW 5/2021- CEA (1.4)  (33).  \par \par 12/7/2021- Pt. continues to experience generalized abdominal pain/discomfort.  She had a colonoscopy on 10/13/2021 and was found to have 3 benign polyps (2 hyperplastic polyps and 1 tubular adenoma).  Remains on Gabapentin for neuropathic abdominal pain. Reports occasional constipation which is relieved with senna/Colace. Continues to wear an abdominal binder.  Met with general surgeon?  Pt. continues f/u with Dr. Katty Navarro (Heme-Onc).  BW 11/2021 (CEA- 1.6 ng/mL);  (40 U/mL).  Pt. is scheduled for a CT C/A/P today at 6pm. \par . \par PLAN:\par 1) Imaging and BW as per Dr. Katty Navarro  (scheduled for CT C/A/P today)\par 2) Given life limiting symptoms of abdominal pain and large ventral hernia defect with loss of domain, she is functionally limited by it.  She describes being unable to  heavier items, bend over, and pain on straining. It is unclear whether this is directly related to hernia or due to past surgical history and Whipple, however she is far enough out from surgery without evidence of recurrence that it is reasonable to repair her hernia to improve function and also hopefully improve pain symptoms.  I explained to her that the pain may still be present after surgery.\par 3) Plastic surgery for component separation\par

## 2021-12-12 NOTE — HISTORY OF PRESENT ILLNESS
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 \par \par Initially was admitted to \[facility]\"" on 12/18/18 with dizziness, found to have elevated LFTs. MRCP demonstrated a dilatation of the CBD. ERCP on 12/22/18 performed c/w ampullary mass with obstruction. Pathology c/w adenocarcinoma. Pt was discharged for out pt follow up with surg onc and med onc but was re-admitted on 1/5/19 with n/v, worsening abdominal pain, found to have severe sepsis with E coli bacteremia 2/2 acute cholangitis. \par \par 1/5/19: CT A/P Heterogeneity of the right hepatic lobe with questionable 1.3 cm hypoattenuating lesion in segment 7.\par 1/7/19: ERCP performed. Stent was removed from the biliary tree. One plastic stent placed\par 1/8/19: MRI Abd: No suspicious liver lesions. No abnormality is identified to correspond with questionable lesion identified on prior CT. 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  Taken to the OR for evacuation of hematoma, cessation of gastroduodenal artery bleeding with two hemostatic stitches, and placement of abthera VAC.  Required pressors in the SICU, then developed fever. \par 1/21-1/22/19: OR exploratory laparotomy, drainage of abdominal abscess, repair of abdominal wall hernia with Strattice mesh, b/ abdominal wall advancement flaps. Post-op patient remained intubated and off vasopressors. FENA calculated to be pre-renal. 500 CC albumin bolus given, urine output improved. 1/22/19 attempted spontaneous breathing trials but pt hypoxic. Pt lactate uptrend, resuscitated w/IVF, and pt hypotensive requiring pressors. UCx growing candida, Diflucan changed to micafungin to r/o resistant organisms in the setting of worsening sepsis. \par 1/23/19: patient noted to have blood oozing from midline incision while hypotensive and on pressors. Peak pressures noted to be elevated on vent and abdomen increasingly distended. All staples removed and a large amount of clot evacuated from midline incision. 1U PRBC given. No active bleeding appreciated. Fascia remains closed. Wound packed with wet to dry dressing. Vitamin K given for elevated INR. \par 1/24-1/29/19: remained in SICU, started on TPN, diuresed, finally extubated. \par 2/1/19: septic shock again, pressors restarted\par 2/2-2/11/19: CT scan with peritonitis, new abscesses. Ab and Antifungals started. Wound with increase in drainage. Octreotide started for increased output \par 2/12/19: drainage of abscesses by IR.  \par 2/18/19: Wound was partially closed by plastics with interrupted sutures and an ostomy appliance was placed, VAC removed.\par 3/1/19. d/c home \par 4/30-10/21/19: s/p 8 cycles Gemzar/Xeloda chemotherapy\par 10/10/19: CT CAP: no evidence of mets\par 1/20-11/2020: prolonged stay in [facility] due to COVID \par 11/6/20: CT CAP: 2 mildly enlarged mesenteric LN, attention in f/u\par 5/19/21: CT CAP revealed stable exam, unchanged cardiophrenic and mesenteric root lymph nodes [de-identified] : adenocarcinoma [de-identified] : CA [id number] [de-identified] : FINAL PATHOLOGY:\par -Invasive adenocarcinoma of the duodenal ampulla (Tumor size: 2.5 x 1.5 x 0.8 cm); Tumor stage: pT3b pN1\par -Vascular and perineural invasion identified\par -1/24 lymph nodes involved by adenocarcinoma\par -Negative margins\par -S/p cholecystectomy ; acute cholecystitis with cholelithiasis \par -Bile duct margin with acute inflammation and focal atypia consistent with low grade dysplasia. \par  [FreeTextEntry1] : surveillance [de-identified] : Patient's generalized abdominal pain/discomfort remains persistent since her last office visit. She was recently seen by gastroenterology (completed follow-up colonoscopy on 10/13/21), physiatry (currently on gabapentin dosage to 300 mg twice daily) and PCP (diabetes management). She also reported occasional abdominal cramping sensation, which is believed to be related with her constipation regimen (advised to stop MiraLAX and continue Senna/Colace tablet only at this time). Patient admitted to stable weight, makes a conscious effort to eat throughout and able to complete self-care ADLs throughout the day but gets tired easily and feels restricted with her physical activities as a result. \par

## 2021-12-12 NOTE — REASON FOR VISIT
[Follow-Up Visit] : a follow-up [Other: _____] : [unfilled] [FreeTextEntry2] : Resected ampullary ca on surveillance
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
## 2022-01-07 ENCOUNTER — OUTPATIENT (OUTPATIENT)
Dept: OUTPATIENT SERVICES | Facility: HOSPITAL | Age: 57
LOS: 1 days | End: 2022-01-07
Payer: MEDICAID

## 2022-01-07 VITALS
RESPIRATION RATE: 16 BRPM | DIASTOLIC BLOOD PRESSURE: 64 MMHG | TEMPERATURE: 98 F | HEIGHT: 59 IN | WEIGHT: 104.94 LBS | SYSTOLIC BLOOD PRESSURE: 106 MMHG | HEART RATE: 67 BPM | OXYGEN SATURATION: 99 %

## 2022-01-07 DIAGNOSIS — Z98.890 OTHER SPECIFIED POSTPROCEDURAL STATES: Chronic | ICD-10-CM

## 2022-01-07 DIAGNOSIS — Z90.410 ACQUIRED TOTAL ABSENCE OF PANCREAS: Chronic | ICD-10-CM

## 2022-01-07 DIAGNOSIS — K43.2 INCISIONAL HERNIA WITHOUT OBSTRUCTION OR GANGRENE: ICD-10-CM

## 2022-01-07 DIAGNOSIS — K92.2 GASTROINTESTINAL HEMORRHAGE, UNSPECIFIED: Chronic | ICD-10-CM

## 2022-01-07 DIAGNOSIS — E11.9 TYPE 2 DIABETES MELLITUS WITHOUT COMPLICATIONS: ICD-10-CM

## 2022-01-07 DIAGNOSIS — R06.02 SHORTNESS OF BREATH: ICD-10-CM

## 2022-01-07 DIAGNOSIS — Z51.11 ENCOUNTER FOR ANTINEOPLASTIC CHEMOTHERAPY: Chronic | ICD-10-CM

## 2022-01-07 LAB
A1C WITH ESTIMATED AVERAGE GLUCOSE RESULT: 7 % — HIGH (ref 4–5.6)
ALBUMIN SERPL ELPH-MCNC: 4.2 G/DL — SIGNIFICANT CHANGE UP (ref 3.3–5)
ALP SERPL-CCNC: 118 U/L — SIGNIFICANT CHANGE UP (ref 40–120)
ALT FLD-CCNC: 29 U/L — SIGNIFICANT CHANGE UP (ref 4–33)
ANION GAP SERPL CALC-SCNC: 9 MMOL/L — SIGNIFICANT CHANGE UP (ref 7–14)
AST SERPL-CCNC: 54 U/L — HIGH (ref 4–32)
BILIRUB SERPL-MCNC: 0.7 MG/DL — SIGNIFICANT CHANGE UP (ref 0.2–1.2)
BLD GP AB SCN SERPL QL: NEGATIVE — SIGNIFICANT CHANGE UP
BUN SERPL-MCNC: 7 MG/DL — SIGNIFICANT CHANGE UP (ref 7–23)
CALCIUM SERPL-MCNC: 9.3 MG/DL — SIGNIFICANT CHANGE UP (ref 8.4–10.5)
CHLORIDE SERPL-SCNC: 104 MMOL/L — SIGNIFICANT CHANGE UP (ref 98–107)
CO2 SERPL-SCNC: 24 MMOL/L — SIGNIFICANT CHANGE UP (ref 22–31)
CREAT SERPL-MCNC: 0.65 MG/DL — SIGNIFICANT CHANGE UP (ref 0.5–1.3)
ESTIMATED AVERAGE GLUCOSE: 154 — SIGNIFICANT CHANGE UP
GLUCOSE SERPL-MCNC: 258 MG/DL — HIGH (ref 70–99)
HCT VFR BLD CALC: 33.9 % — LOW (ref 34.5–45)
HGB BLD-MCNC: 10.8 G/DL — LOW (ref 11.5–15.5)
MCHC RBC-ENTMCNC: 24.4 PG — LOW (ref 27–34)
MCHC RBC-ENTMCNC: 31.9 GM/DL — LOW (ref 32–36)
MCV RBC AUTO: 76.7 FL — LOW (ref 80–100)
NRBC # BLD: 0 /100 WBCS — SIGNIFICANT CHANGE UP
NRBC # FLD: 0 K/UL — SIGNIFICANT CHANGE UP
PLATELET # BLD AUTO: 109 K/UL — LOW (ref 150–400)
POTASSIUM SERPL-MCNC: 4.1 MMOL/L — SIGNIFICANT CHANGE UP (ref 3.5–5.3)
POTASSIUM SERPL-SCNC: 4.1 MMOL/L — SIGNIFICANT CHANGE UP (ref 3.5–5.3)
PROT SERPL-MCNC: 7.3 G/DL — SIGNIFICANT CHANGE UP (ref 6–8.3)
RBC # BLD: 4.42 M/UL — SIGNIFICANT CHANGE UP (ref 3.8–5.2)
RBC # FLD: 14.8 % — HIGH (ref 10.3–14.5)
RH IG SCN BLD-IMP: POSITIVE — SIGNIFICANT CHANGE UP
SODIUM SERPL-SCNC: 137 MMOL/L — SIGNIFICANT CHANGE UP (ref 135–145)
WBC # BLD: 4.83 K/UL — SIGNIFICANT CHANGE UP (ref 3.8–10.5)
WBC # FLD AUTO: 4.83 K/UL — SIGNIFICANT CHANGE UP (ref 3.8–10.5)

## 2022-01-07 PROCEDURE — 93010 ELECTROCARDIOGRAM REPORT: CPT

## 2022-01-07 RX ORDER — MONTELUKAST 4 MG/1
1 TABLET, CHEWABLE ORAL
Qty: 0 | Refills: 0 | DISCHARGE

## 2022-01-07 RX ORDER — SODIUM CHLORIDE 9 MG/ML
3 INJECTION INTRAMUSCULAR; INTRAVENOUS; SUBCUTANEOUS EVERY 8 HOURS
Refills: 0 | Status: DISCONTINUED | OUTPATIENT
Start: 2022-01-21 | End: 2022-01-31

## 2022-01-07 RX ORDER — SODIUM CHLORIDE 9 MG/ML
1000 INJECTION, SOLUTION INTRAVENOUS
Refills: 0 | Status: DISCONTINUED | OUTPATIENT
Start: 2022-01-21 | End: 2022-01-21

## 2022-01-07 NOTE — H&P PST ADULT - HISTORY OF PRESENT ILLNESS
56 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  Pt with c/o abdominal hernia which has increased in size and is causing discomfort. Pt presents today for presurgical evaluation for ... 56 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  Pt with c/o abdominal hernia which has increased in size and is causing discomfort. Pt presents today for presurgical evaluation for Exploratory Laparotomy Lysis of Adhesions, Repair of Ventral Hernia, Component Separation (Dr. Hudson to add codes)

## 2022-01-07 NOTE — H&P PST ADULT - NSICDXPASTSURGICALHX_GEN_ALL_CORE_FT
PAST SURGICAL HISTORY:  Admission for chemotherapy right chest wall port    H/O drainage of abscess and abdominal wall repair 1/21/19    H/O eye surgery     H/O Whipple procedure 1/11/2019    Hemorrhage of gastroduodenal artery s/p surgery 1/18/19

## 2022-01-07 NOTE — H&P PST ADULT - NSICDXPASTMEDICALHX_GEN_ALL_CORE_FT
PAST MEDICAL HISTORY:  Arthritis     Cancer of ampulla of Vater diagnosed 2018 -s/p surgery and chemo    Gastroesophageal reflux disease without esophagitis     Mild intermittent asthma without complication     Neuropathy     Type 2 diabetes mellitus without complication, without long-term current use of insulin

## 2022-01-07 NOTE — H&P PST ADULT - ATTENDING COMMENTS
Risks, benefits, and alternatives discussed with the patient via daughter - she expressed understanding and agrees to proceed with exploratory laparotomy, lysis of adhesions, repair of ventral hernia with mesh.

## 2022-01-07 NOTE — H&P PST ADULT - NSICDXFAMILYHX_GEN_ALL_CORE_FT
FAMILY HISTORY:  Sibling  Still living? Unknown  Family history of diabetes mellitus (DM), Age at diagnosis: Age Unknown  FH: HTN (hypertension), Age at diagnosis: Age Unknown

## 2022-01-07 NOTE — H&P PST ADULT - PROBLEM SELECTOR PLAN 1
Pt scheduled for surgery on 1/21/2022.  Pre-op instructions provided. Pt verbalized understanding.   Pt to take own medication for GI ppx.   Pt given detailed verbal and written instructions on chlorhexidine wash. Pt verbalized understanding with teachback.   Pt instructed to follow up with surgeon regarding preop COVID testing.

## 2022-01-20 ENCOUNTER — TRANSCRIPTION ENCOUNTER (OUTPATIENT)
Age: 57
End: 2022-01-20

## 2022-01-20 NOTE — ASU PATIENT PROFILE, ADULT - FALL HARM RISK - HARM RISK INTERVENTIONS

## 2022-01-20 NOTE — ASU PATIENT PROFILE, ADULT - INTERPRETATION SERVICES DECLINED
Patient/Caregiver requests family/friend to interpret. daughter Anwara/Patient/Caregiver requests family/friend to interpret.

## 2022-01-21 ENCOUNTER — INPATIENT (INPATIENT)
Facility: HOSPITAL | Age: 57
LOS: 9 days | Discharge: HOME | End: 2022-01-31
Attending: SURGERY | Admitting: PLASTIC SURGERY
Payer: MEDICAID

## 2022-01-21 ENCOUNTER — RESULT REVIEW (OUTPATIENT)
Age: 57
End: 2022-01-21

## 2022-01-21 ENCOUNTER — APPOINTMENT (OUTPATIENT)
Dept: SURGICAL ONCOLOGY | Facility: HOSPITAL | Age: 57
End: 2022-01-21

## 2022-01-21 ENCOUNTER — APPOINTMENT (OUTPATIENT)
Dept: PLASTIC SURGERY | Facility: HOSPITAL | Age: 57
End: 2022-01-21
Payer: MEDICAID

## 2022-01-21 VITALS
RESPIRATION RATE: 16 BRPM | HEIGHT: 59 IN | TEMPERATURE: 99 F | DIASTOLIC BLOOD PRESSURE: 60 MMHG | OXYGEN SATURATION: 98 % | WEIGHT: 104.94 LBS | HEART RATE: 75 BPM | SYSTOLIC BLOOD PRESSURE: 111 MMHG

## 2022-01-21 DIAGNOSIS — Z98.890 OTHER SPECIFIED POSTPROCEDURAL STATES: Chronic | ICD-10-CM

## 2022-01-21 DIAGNOSIS — K43.2 INCISIONAL HERNIA WITHOUT OBSTRUCTION OR GANGRENE: ICD-10-CM

## 2022-01-21 DIAGNOSIS — Z51.11 ENCOUNTER FOR ANTINEOPLASTIC CHEMOTHERAPY: Chronic | ICD-10-CM

## 2022-01-21 DIAGNOSIS — Z90.410 ACQUIRED TOTAL ABSENCE OF PANCREAS: Chronic | ICD-10-CM

## 2022-01-21 DIAGNOSIS — K92.2 GASTROINTESTINAL HEMORRHAGE, UNSPECIFIED: Chronic | ICD-10-CM

## 2022-01-21 LAB
ALBUMIN SERPL ELPH-MCNC: 4.2 G/DL — SIGNIFICANT CHANGE UP (ref 3.3–5)
ALP SERPL-CCNC: 93 U/L — SIGNIFICANT CHANGE UP (ref 40–120)
ALT FLD-CCNC: 63 U/L — HIGH (ref 4–33)
ANION GAP SERPL CALC-SCNC: 17 MMOL/L — HIGH (ref 7–14)
ANION GAP SERPL CALC-SCNC: 19 MMOL/L — HIGH (ref 7–14)
ANION GAP SERPL CALC-SCNC: 19 MMOL/L — HIGH (ref 7–14)
AST SERPL-CCNC: 92 U/L — HIGH (ref 4–32)
BILIRUB SERPL-MCNC: 1.1 MG/DL — SIGNIFICANT CHANGE UP (ref 0.2–1.2)
BUN SERPL-MCNC: 10 MG/DL — SIGNIFICANT CHANGE UP (ref 7–23)
BUN SERPL-MCNC: 10 MG/DL — SIGNIFICANT CHANGE UP (ref 7–23)
BUN SERPL-MCNC: 11 MG/DL — SIGNIFICANT CHANGE UP (ref 7–23)
CALCIUM SERPL-MCNC: 8.4 MG/DL — SIGNIFICANT CHANGE UP (ref 8.4–10.5)
CALCIUM SERPL-MCNC: 8.5 MG/DL — SIGNIFICANT CHANGE UP (ref 8.4–10.5)
CALCIUM SERPL-MCNC: 8.6 MG/DL — SIGNIFICANT CHANGE UP (ref 8.4–10.5)
CHLORIDE SERPL-SCNC: 101 MMOL/L — SIGNIFICANT CHANGE UP (ref 98–107)
CHLORIDE SERPL-SCNC: 101 MMOL/L — SIGNIFICANT CHANGE UP (ref 98–107)
CHLORIDE SERPL-SCNC: 102 MMOL/L — SIGNIFICANT CHANGE UP (ref 98–107)
CO2 SERPL-SCNC: 15 MMOL/L — LOW (ref 22–31)
CO2 SERPL-SCNC: 16 MMOL/L — LOW (ref 22–31)
CO2 SERPL-SCNC: 16 MMOL/L — LOW (ref 22–31)
CREAT SERPL-MCNC: 0.66 MG/DL — SIGNIFICANT CHANGE UP (ref 0.5–1.3)
CREAT SERPL-MCNC: 0.72 MG/DL — SIGNIFICANT CHANGE UP (ref 0.5–1.3)
CREAT SERPL-MCNC: 0.82 MG/DL — SIGNIFICANT CHANGE UP (ref 0.5–1.3)
GLUCOSE BLDC GLUCOMTR-MCNC: 255 MG/DL — HIGH (ref 70–99)
GLUCOSE BLDC GLUCOMTR-MCNC: 283 MG/DL — HIGH (ref 70–99)
GLUCOSE BLDC GLUCOMTR-MCNC: 287 MG/DL — HIGH (ref 70–99)
GLUCOSE SERPL-MCNC: 281 MG/DL — HIGH (ref 70–99)
GLUCOSE SERPL-MCNC: 284 MG/DL — HIGH (ref 70–99)
GLUCOSE SERPL-MCNC: 293 MG/DL — HIGH (ref 70–99)
HCT VFR BLD CALC: 25.3 % — LOW (ref 34.5–45)
HCT VFR BLD CALC: 30.7 % — LOW (ref 34.5–45)
HGB BLD-MCNC: 7.8 G/DL — LOW (ref 11.5–15.5)
HGB BLD-MCNC: 9.8 G/DL — LOW (ref 11.5–15.5)
LACTATE SERPL-SCNC: 8 MMOL/L — CRITICAL HIGH (ref 0.5–2)
LACTATE SERPL-SCNC: 8.3 MMOL/L — CRITICAL HIGH (ref 0.5–2)
LACTATE SERPL-SCNC: 8.7 MMOL/L — CRITICAL HIGH (ref 0.5–2)
MAGNESIUM SERPL-MCNC: 1.4 MG/DL — LOW (ref 1.6–2.6)
MAGNESIUM SERPL-MCNC: 1.5 MG/DL — LOW (ref 1.6–2.6)
MAGNESIUM SERPL-MCNC: 2.4 MG/DL — SIGNIFICANT CHANGE UP (ref 1.6–2.6)
MCHC RBC-ENTMCNC: 24.4 PG — LOW (ref 27–34)
MCHC RBC-ENTMCNC: 24.6 PG — LOW (ref 27–34)
MCHC RBC-ENTMCNC: 30.8 GM/DL — LOW (ref 32–36)
MCHC RBC-ENTMCNC: 31.9 GM/DL — LOW (ref 32–36)
MCV RBC AUTO: 76.6 FL — LOW (ref 80–100)
MCV RBC AUTO: 79.8 FL — LOW (ref 80–100)
NRBC # BLD: 0 /100 WBCS — SIGNIFICANT CHANGE UP
NRBC # BLD: 0 /100 WBCS — SIGNIFICANT CHANGE UP
NRBC # FLD: 0 K/UL — SIGNIFICANT CHANGE UP
NRBC # FLD: 0 K/UL — SIGNIFICANT CHANGE UP
PHOSPHATE SERPL-MCNC: 4.8 MG/DL — HIGH (ref 2.5–4.5)
PHOSPHATE SERPL-MCNC: 5.6 MG/DL — HIGH (ref 2.5–4.5)
PHOSPHATE SERPL-MCNC: 6.5 MG/DL — HIGH (ref 2.5–4.5)
PLATELET # BLD AUTO: 118 K/UL — LOW (ref 150–400)
PLATELET # BLD AUTO: 187 K/UL — SIGNIFICANT CHANGE UP (ref 150–400)
POTASSIUM SERPL-MCNC: 5 MMOL/L — SIGNIFICANT CHANGE UP (ref 3.5–5.3)
POTASSIUM SERPL-MCNC: 5.3 MMOL/L — SIGNIFICANT CHANGE UP (ref 3.5–5.3)
POTASSIUM SERPL-MCNC: 5.7 MMOL/L — HIGH (ref 3.5–5.3)
POTASSIUM SERPL-SCNC: 5 MMOL/L — SIGNIFICANT CHANGE UP (ref 3.5–5.3)
POTASSIUM SERPL-SCNC: 5.3 MMOL/L — SIGNIFICANT CHANGE UP (ref 3.5–5.3)
POTASSIUM SERPL-SCNC: 5.7 MMOL/L — HIGH (ref 3.5–5.3)
PROT SERPL-MCNC: 7 G/DL — SIGNIFICANT CHANGE UP (ref 6–8.3)
RBC # BLD: 3.17 M/UL — LOW (ref 3.8–5.2)
RBC # BLD: 4.01 M/UL — SIGNIFICANT CHANGE UP (ref 3.8–5.2)
RBC # FLD: 15.3 % — HIGH (ref 10.3–14.5)
RBC # FLD: 15.3 % — HIGH (ref 10.3–14.5)
SODIUM SERPL-SCNC: 135 MMOL/L — SIGNIFICANT CHANGE UP (ref 135–145)
SODIUM SERPL-SCNC: 135 MMOL/L — SIGNIFICANT CHANGE UP (ref 135–145)
SODIUM SERPL-SCNC: 136 MMOL/L — SIGNIFICANT CHANGE UP (ref 135–145)
TROPONIN T, HIGH SENSITIVITY RESULT: 7 NG/L — SIGNIFICANT CHANGE UP
WBC # BLD: 11.12 K/UL — HIGH (ref 3.8–10.5)
WBC # BLD: 14.49 K/UL — HIGH (ref 3.8–10.5)
WBC # FLD AUTO: 11.12 K/UL — HIGH (ref 3.8–10.5)
WBC # FLD AUTO: 14.49 K/UL — HIGH (ref 3.8–10.5)

## 2022-01-21 PROCEDURE — 49565: CPT

## 2022-01-21 PROCEDURE — 88305 TISSUE EXAM BY PATHOLOGIST: CPT | Mod: 26

## 2022-01-21 PROCEDURE — 44050 REDUCE BOWEL OBSTRUCTION: CPT

## 2022-01-21 PROCEDURE — 99291 CRITICAL CARE FIRST HOUR: CPT

## 2022-01-21 PROCEDURE — 71045 X-RAY EXAM CHEST 1 VIEW: CPT | Mod: 26

## 2022-01-21 PROCEDURE — XXXXX: CPT

## 2022-01-21 DEVICE — MESH PHASIX 10X20CM RECTANGLE: Type: IMPLANTABLE DEVICE | Status: FUNCTIONAL

## 2022-01-21 DEVICE — LIGATING CLIPS WECK HORIZON MEDIUM (BLUE) 24: Type: IMPLANTABLE DEVICE | Status: FUNCTIONAL

## 2022-01-21 DEVICE — LIGATING CLIPS WECK HORIZON LARGE (ORANGE) 24: Type: IMPLANTABLE DEVICE | Status: FUNCTIONAL

## 2022-01-21 RX ORDER — DEXTROSE 50 % IN WATER 50 %
15 SYRINGE (ML) INTRAVENOUS ONCE
Refills: 0 | Status: DISCONTINUED | OUTPATIENT
Start: 2022-01-21 | End: 2022-01-21

## 2022-01-21 RX ORDER — SODIUM CHLORIDE 9 MG/ML
1000 INJECTION, SOLUTION INTRAVENOUS
Refills: 0 | Status: DISCONTINUED | OUTPATIENT
Start: 2022-01-21 | End: 2022-01-21

## 2022-01-21 RX ORDER — SODIUM CHLORIDE 9 MG/ML
1000 INJECTION, SOLUTION INTRAVENOUS ONCE
Refills: 0 | Status: COMPLETED | OUTPATIENT
Start: 2022-01-21 | End: 2022-01-21

## 2022-01-21 RX ORDER — ONDANSETRON 8 MG/1
4 TABLET, FILM COATED ORAL EVERY 6 HOURS
Refills: 0 | Status: DISCONTINUED | OUTPATIENT
Start: 2022-01-21 | End: 2022-01-31

## 2022-01-21 RX ORDER — SODIUM CHLORIDE 9 MG/ML
1000 INJECTION, SOLUTION INTRAVENOUS
Refills: 0 | Status: DISCONTINUED | OUTPATIENT
Start: 2022-01-21 | End: 2022-01-24

## 2022-01-21 RX ORDER — GLUCAGON INJECTION, SOLUTION 0.5 MG/.1ML
1 INJECTION, SOLUTION SUBCUTANEOUS ONCE
Refills: 0 | Status: DISCONTINUED | OUTPATIENT
Start: 2022-01-21 | End: 2022-01-21

## 2022-01-21 RX ORDER — OXYCODONE HYDROCHLORIDE 5 MG/1
10 TABLET ORAL
Refills: 0 | Status: DISCONTINUED | OUTPATIENT
Start: 2022-01-21 | End: 2022-01-21

## 2022-01-21 RX ORDER — DEXTROSE 50 % IN WATER 50 %
12.5 SYRINGE (ML) INTRAVENOUS ONCE
Refills: 0 | Status: DISCONTINUED | OUTPATIENT
Start: 2022-01-21 | End: 2022-01-21

## 2022-01-21 RX ORDER — ENOXAPARIN SODIUM 100 MG/ML
40 INJECTION SUBCUTANEOUS EVERY 24 HOURS
Refills: 0 | Status: DISCONTINUED | OUTPATIENT
Start: 2022-01-21 | End: 2022-01-22

## 2022-01-21 RX ORDER — DEXTROSE 50 % IN WATER 50 %
25 SYRINGE (ML) INTRAVENOUS ONCE
Refills: 0 | Status: DISCONTINUED | OUTPATIENT
Start: 2022-01-21 | End: 2022-01-21

## 2022-01-21 RX ORDER — INFLUENZA VIRUS VACCINE 15; 15; 15; 15 UG/.5ML; UG/.5ML; UG/.5ML; UG/.5ML
0.5 SUSPENSION INTRAMUSCULAR ONCE
Refills: 0 | Status: DISCONTINUED | OUTPATIENT
Start: 2022-01-21 | End: 2022-01-31

## 2022-01-21 RX ORDER — INSULIN LISPRO 100/ML
VIAL (ML) SUBCUTANEOUS EVERY 6 HOURS
Refills: 0 | Status: DISCONTINUED | OUTPATIENT
Start: 2022-01-21 | End: 2022-01-21

## 2022-01-21 RX ORDER — INSULIN LISPRO 100/ML
VIAL (ML) SUBCUTANEOUS EVERY 6 HOURS
Refills: 0 | Status: DISCONTINUED | OUTPATIENT
Start: 2022-01-21 | End: 2022-01-22

## 2022-01-21 RX ORDER — OXYCODONE HYDROCHLORIDE 5 MG/1
5 TABLET ORAL
Refills: 0 | Status: DISCONTINUED | OUTPATIENT
Start: 2022-01-21 | End: 2022-01-21

## 2022-01-21 RX ORDER — HYDROMORPHONE HYDROCHLORIDE 2 MG/ML
1 INJECTION INTRAMUSCULAR; INTRAVENOUS; SUBCUTANEOUS
Refills: 0 | Status: DISCONTINUED | OUTPATIENT
Start: 2022-01-21 | End: 2022-01-21

## 2022-01-21 RX ORDER — HYDROMORPHONE HYDROCHLORIDE 2 MG/ML
0.5 INJECTION INTRAMUSCULAR; INTRAVENOUS; SUBCUTANEOUS EVERY 6 HOURS
Refills: 0 | Status: DISCONTINUED | OUTPATIENT
Start: 2022-01-21 | End: 2022-01-22

## 2022-01-21 RX ORDER — NALOXONE HYDROCHLORIDE 4 MG/.1ML
0.1 SPRAY NASAL
Refills: 0 | Status: DISCONTINUED | OUTPATIENT
Start: 2022-01-21 | End: 2022-01-31

## 2022-01-21 RX ORDER — PANTOPRAZOLE SODIUM 20 MG/1
40 TABLET, DELAYED RELEASE ORAL DAILY
Refills: 0 | Status: DISCONTINUED | OUTPATIENT
Start: 2022-01-21 | End: 2022-01-28

## 2022-01-21 RX ORDER — ACETAMINOPHEN 500 MG
1000 TABLET ORAL EVERY 6 HOURS
Refills: 0 | Status: DISCONTINUED | OUTPATIENT
Start: 2022-01-21 | End: 2022-01-21

## 2022-01-21 RX ORDER — HYDROMORPHONE HYDROCHLORIDE 2 MG/ML
0.5 INJECTION INTRAMUSCULAR; INTRAVENOUS; SUBCUTANEOUS
Refills: 0 | Status: DISCONTINUED | OUTPATIENT
Start: 2022-01-21 | End: 2022-01-21

## 2022-01-21 RX ORDER — INSULIN LISPRO 100/ML
VIAL (ML) SUBCUTANEOUS
Refills: 0 | Status: DISCONTINUED | OUTPATIENT
Start: 2022-01-21 | End: 2022-01-21

## 2022-01-21 RX ORDER — ONDANSETRON 8 MG/1
4 TABLET, FILM COATED ORAL ONCE
Refills: 0 | Status: DISCONTINUED | OUTPATIENT
Start: 2022-01-21 | End: 2022-01-21

## 2022-01-21 RX ORDER — ACETAMINOPHEN 500 MG
650 TABLET ORAL EVERY 6 HOURS
Refills: 0 | Status: COMPLETED | OUTPATIENT
Start: 2022-01-21 | End: 2022-01-22

## 2022-01-21 RX ORDER — INSULIN LISPRO 100/ML
VIAL (ML) SUBCUTANEOUS EVERY 6 HOURS
Refills: 0 | Status: DISCONTINUED | OUTPATIENT
Start: 2022-01-21 | End: 2022-01-24

## 2022-01-21 RX ORDER — DEXTROSE 50 % IN WATER 50 %
12.5 SYRINGE (ML) INTRAVENOUS ONCE
Refills: 0 | Status: DISCONTINUED | OUTPATIENT
Start: 2022-01-21 | End: 2022-01-24

## 2022-01-21 RX ORDER — DEXTROSE 50 % IN WATER 50 %
25 SYRINGE (ML) INTRAVENOUS ONCE
Refills: 0 | Status: DISCONTINUED | OUTPATIENT
Start: 2022-01-21 | End: 2022-01-24

## 2022-01-21 RX ORDER — ALBUMIN HUMAN 25 %
500 VIAL (ML) INTRAVENOUS ONCE
Refills: 0 | Status: COMPLETED | OUTPATIENT
Start: 2022-01-21 | End: 2022-01-21

## 2022-01-21 RX ORDER — MAGNESIUM SULFATE 500 MG/ML
2 VIAL (ML) INJECTION ONCE
Refills: 0 | Status: COMPLETED | OUTPATIENT
Start: 2022-01-21 | End: 2022-01-21

## 2022-01-21 RX ORDER — ONDANSETRON 8 MG/1
4 TABLET, FILM COATED ORAL ONCE
Refills: 0 | Status: COMPLETED | OUTPATIENT
Start: 2022-01-21 | End: 2022-01-21

## 2022-01-21 RX ORDER — INSULIN LISPRO 100/ML
VIAL (ML) SUBCUTANEOUS AT BEDTIME
Refills: 0 | Status: DISCONTINUED | OUTPATIENT
Start: 2022-01-21 | End: 2022-01-21

## 2022-01-21 RX ADMIN — SODIUM CHLORIDE 75 MILLILITER(S): 9 INJECTION, SOLUTION INTRAVENOUS at 13:25

## 2022-01-21 RX ADMIN — HYDROMORPHONE HYDROCHLORIDE 0.5 MILLIGRAM(S): 2 INJECTION INTRAMUSCULAR; INTRAVENOUS; SUBCUTANEOUS at 14:49

## 2022-01-21 RX ADMIN — Medication 260 MILLIGRAM(S): at 18:50

## 2022-01-21 RX ADMIN — HYDROMORPHONE HYDROCHLORIDE 1 MILLIGRAM(S): 2 INJECTION INTRAMUSCULAR; INTRAVENOUS; SUBCUTANEOUS at 13:24

## 2022-01-21 RX ADMIN — ONDANSETRON 4 MILLIGRAM(S): 8 TABLET, FILM COATED ORAL at 21:50

## 2022-01-21 RX ADMIN — ONDANSETRON 4 MILLIGRAM(S): 8 TABLET, FILM COATED ORAL at 19:02

## 2022-01-21 RX ADMIN — SODIUM CHLORIDE 3 MILLILITER(S): 9 INJECTION INTRAMUSCULAR; INTRAVENOUS; SUBCUTANEOUS at 21:28

## 2022-01-21 RX ADMIN — Medication 2: at 22:29

## 2022-01-21 RX ADMIN — SODIUM CHLORIDE 2000 MILLILITER(S): 9 INJECTION, SOLUTION INTRAVENOUS at 18:51

## 2022-01-21 RX ADMIN — ENOXAPARIN SODIUM 40 MILLIGRAM(S): 100 INJECTION SUBCUTANEOUS at 23:45

## 2022-01-21 RX ADMIN — HYDROMORPHONE HYDROCHLORIDE 0.5 MILLIGRAM(S): 2 INJECTION INTRAMUSCULAR; INTRAVENOUS; SUBCUTANEOUS at 19:20

## 2022-01-21 RX ADMIN — Medication 3: at 18:49

## 2022-01-21 RX ADMIN — SODIUM CHLORIDE 1000 MILLILITER(S): 9 INJECTION, SOLUTION INTRAVENOUS at 17:50

## 2022-01-21 RX ADMIN — SODIUM CHLORIDE 3 MILLILITER(S): 9 INJECTION INTRAMUSCULAR; INTRAVENOUS; SUBCUTANEOUS at 13:27

## 2022-01-21 RX ADMIN — HYDROMORPHONE HYDROCHLORIDE 0.5 MILLIGRAM(S): 2 INJECTION INTRAMUSCULAR; INTRAVENOUS; SUBCUTANEOUS at 19:00

## 2022-01-21 RX ADMIN — HYDROMORPHONE HYDROCHLORIDE 0.5 MILLIGRAM(S): 2 INJECTION INTRAMUSCULAR; INTRAVENOUS; SUBCUTANEOUS at 18:41

## 2022-01-21 RX ADMIN — Medication 250 MILLILITER(S): at 16:22

## 2022-01-21 RX ADMIN — SODIUM CHLORIDE 1000 MILLILITER(S): 9 INJECTION, SOLUTION INTRAVENOUS at 21:49

## 2022-01-21 RX ADMIN — Medication 25 GRAM(S): at 18:51

## 2022-01-21 RX ADMIN — HYDROMORPHONE HYDROCHLORIDE 0.5 MILLIGRAM(S): 2 INJECTION INTRAMUSCULAR; INTRAVENOUS; SUBCUTANEOUS at 15:00

## 2022-01-21 RX ADMIN — HYDROMORPHONE HYDROCHLORIDE 1 MILLIGRAM(S): 2 INJECTION INTRAMUSCULAR; INTRAVENOUS; SUBCUTANEOUS at 13:45

## 2022-01-21 RX ADMIN — HYDROMORPHONE HYDROCHLORIDE 0.5 MILLIGRAM(S): 2 INJECTION INTRAMUSCULAR; INTRAVENOUS; SUBCUTANEOUS at 19:35

## 2022-01-21 NOTE — CONSULT NOTE ADULT - SUBJECTIVE AND OBJECTIVE BOX
SICU Consultation Note  =====================================================    HPI:  56 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  Pt with c/o abdominal hernia which has increased in size and is causing discomfort. Pt now s/p Exploratory Laparotomy Lysis of Adhesions, Repair of Ventral Hernia, Component Separation.     Intraoperatively upon initial attempt at closure, there were increased peak pressures on the ventilator; subsequently the rectus sheath was released with improvement in peak pressures. Patient subsequently in PACU for approximately 6 hours with persistent moderate pain, and decreased urine output at approx 16:00 which improved w fluid bolus and albumin.         Surgery Information  OR time: 05:39     EBL: 100         IV Fluids:  1000mL     Blood Products:   UOP:   300mL      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    H/O Whipple procedure  1/11/2019    Hemorrhage of gastroduodenal artery  s/p surgery 1/18/19    H/O drainage of abscess  and abdominal wall repair 1/21/19    H/O eye surgery    Admission for chemotherapy  right chest wall port      Home Meds: Home Medications:  atorvastatin 10 mg oral tablet: 1 tab(s) orally once a day (21 Jan 2022 06:48)  Colace 2-in-1 50 mg-8.6 mg oral tablet: 3 tab(s) orally once a day (at bedtime) (21 Jan 2022 06:48)  gabapentin 300 mg oral capsule: 1 cap(s) orally 2 times a day (21 Jan 2022 06:48)  metFORMIN 500 mg oral tablet: 1 tab(s) orally 2 times a day (21 Jan 2022 06:48)  montelukast 10 mg oral tablet: 1 tab(s) orally once a day (at bedtime) (21 Jan 2022 06:48)  Multiple Vitamins oral capsule: 1 cap(s) orally once a day. Last dose 1/13/22. (21 Jan 2022 06:48)  omeprazole 40 mg oral delayed release capsule: 1 cap(s) orally once a day (21 Jan 2022 06:48)  Vitamin D3 125 mcg (5000 intl units) oral capsule: 1 cap(s) orally once a day (21 Jan 2022 06:48)    Allergies: Allergies    No Known Allergies    Intolerances      Soc:   Advanced Directives: Presumed Full Code     ROS:    REVIEW OF SYSTEMS    [ ] A ten-point review of systems was otherwise negative except as noted.  [X ] Due to altered mental status/intubation, subjective information were not able to be obtained from the patient. History was obtained, to the extent possible, from review of the chart and collateral sources of information.      CURRENT MEDICATIONS:   --------------------------------------------------------------------------------------  Neurologic Medications  acetaminophen   IVPB .. 650 milliGRAM(s) IV Intermittent every 6 hours  HYDROmorphone  Injectable 0.5 milliGRAM(s) IV Push every 6 hours PRN Moderate Pain (4 - 6)  HYDROmorphone  Injectable 0.5 milliGRAM(s) IV Push every 10 minutes PRN Moderate Pain (4 - 6)  ondansetron Injectable 4 milliGRAM(s) IV Push every 6 hours PRN Nausea  ondansetron Injectable 4 milliGRAM(s) IV Push once PRN Nausea and/or Vomiting    Respiratory Medications    Cardiovascular Medications    Gastrointestinal Medications  lactated ringers. 1000 milliLiter(s) IV Continuous <Continuous>  pantoprazole  Injectable 40 milliGRAM(s) IV Push daily  sodium chloride 0.9% lock flush 3 milliLiter(s) IV Push every 8 hours    Genitourinary Medications    Hematologic/Oncologic Medications  enoxaparin Injectable 40 milliGRAM(s) SubCutaneous every 24 hours    Antimicrobial/Immunologic Medications    Endocrine/Metabolic Medications  insulin lispro (ADMELOG) corrective regimen sliding scale   SubCutaneous every 6 hours    Topical/Other Medications  naloxone Injectable 0.1 milliGRAM(s) IV Push every 3 minutes PRN For ANY of the following changes in patient status:  A. RR LESS THAN 10 breaths per minute, B. Oxygen saturation LESS THAN 90%, C. Sedation score of 6    --------------------------------------------------------------------------------------    VITAL SIGNS, INS/OUTS (last 24 hours):  --------------------------------------------------------------------------------------  ICU Vital Signs Last 24 Hrs  T(C): 36.6 (21 Jan 2022 19:30), Max: 37.2 (21 Jan 2022 06:31)  T(F): 97.9 (21 Jan 2022 19:30), Max: 99 (21 Jan 2022 06:31)  HR: 112 (21 Jan 2022 19:30) (75 - 115)  BP: 123/64 (21 Jan 2022 19:30) (86/46 - 135/67)  BP(mean): 76 (21 Jan 2022 19:30) (54 - 84)  ABP: --  ABP(mean): --  RR: 14 (21 Jan 2022 19:30) (11 - 24)  SpO2: 100% (21 Jan 2022 19:30) (93% - 100%)    I&O's Summary    21 Jan 2022 07:01  -  21 Jan 2022 19:34  --------------------------------------------------------  IN: 2025 mL / OUT: 780 mL / NET: 1245 mL      --------------------------------------------------------------------------------------    EXAM:  General/Neuro  Exam: Normal, AAOx3, in mild-moderate discomfort with intermittent groaning     Respiratory  Exam: Lungs clear to auscultation, Normal expansion/effort. Mildly tachypneic to mid 20s.     Cardiovascular  Exam: S1, S2.  Sinus tachycardia. No significant peripheral edema appreciated   Cardiac Rhythm: sinus tachycardia   ECHO:  2019 TTE demonstrates EF 74% w no significant valvular dz     GI  Exam: Abdomen soft in all 4 quadrants, slightly more convex on L side vs R side, dressing in place, drains w serosanguinous fluid.  MIldly TTP.     Tubes/Lines/Drains  ***  [x] Peripheral IV  [] Central Venous Line     	[] R	[] L	[] IJ	[] Fem	[] SC        Type:	    Date Placed:   [] Arterial Line		[] R	[] L	[] Fem	[] Rad	[] Ax	Date Placed:   [] PICC:         	[] Midline		[] Mediport           [] Urinary Catheter		Date Placed:     Extremities  Exam: Extremities warm, pink, well-perfused.      Derm:  Exam: Good skin turgor, no skin breakdown.      :   Exam: Sinha catheter in place.     LABS  --------------------------------------------------------------------------------------  Labs:  CAPILLARY BLOOD GLUCOSE      POCT Blood Glucose.: 283 mg/dL (21 Jan 2022 18:24)                          9.8    14.49 )-----------( 187      ( 21 Jan 2022 16:46 )             30.7         01-21    136  |  101  |  11  ----------------------------<  284<H>  5.3   |  16<L>  |  0.72      Calcium, Total Serum: 8.5 mg/dL (01-21-22 @ 17:48)      LFTs:     Lactate, Blood: 8.0 mmol/L (01-21-22 @ 16:46)      Coags:                  --------------------------------------------------------------------------------------    OTHER LABS    IMAGING RESULTS      ASSESSMENT:  57F with hx of ampullary cancer s/p Whipple procedue 1/11/19, repair of gastroduodenal artery bleed 1/18/19 and drainage of abscess/repair of abdominal wall on 1/21/19, incisional hernia s/p repair today with concern for abdominal compartment syndrome postoperatively.     PLAN:   Neurologic:  -Currently AAOx3  -s/p duramorph spinal intraop w 0.15 mcg  -dilaudid PRN, IV tylenol standing   -consider IV PCA when more alert/as relief from duramorph wanes   -monitor for increased pain, altered mentation     Respiratory:   -mildly tachypneic, no increased WOB   -continue to monitor respiratory status    Cardiovascular:   -mildly tachycardic to low 100s without hx of ischemic dz   -pocus to evaluate fluid status   -reevaluate after fluid bolus   -lactate 8 --> trend q6hr.     Gastrointestinal/Nutrition:   -NG tube in place  -NPO  -No active issues     Renal/Genitourinary:   -urine output improved s/p fluid bolus  -monitor UOP  -will attempt to obtain bladder pressure per plastics request though utility will be limited given patient is extubated and spontaneously ventilating     Hematologic:   -no active tissues  -monitor H&H    Infectious Disease:   -no active issues  -mild postoperative leukocytosis, likely reactive   -monitor for signs of fever     Lines/Tubes:  PIV, sinha, NG tube     Endocrine:   -no active issues     Disposition:   SICU  --------------------------------------------------------------------------------------    Critical Care Diagnoses:

## 2022-01-21 NOTE — BRIEF OPERATIVE NOTE - OPERATION/FINDINGS
Midline scar opened, extensive lysis of adhesions off anterior and lateral abdominal wall. Posterior rectus sheath identified and released laterally to rectus muscles. Posterior sheath reapproximated with 2-0 vicryl. Component separation and retrorectus Phasix mesh placement by PRS to be documented separately.

## 2022-01-21 NOTE — PATIENT PROFILE ADULT - FALL HARM RISK - HARM RISK INTERVENTIONS
Assistance with ambulation/Assistance OOB with selected safe patient handling equipment/Communicate Risk of Fall with Harm to all staff/Discuss with provider need for PT consult/Monitor gait and stability/Provide patient with walking aids - walker, cane, crutches/Reinforce activity limits and safety measures with patient and family/Sit up slowly, dangle for a short time, stand at bedside before walking/Tailored Fall Risk Interventions/Use of alarms - bed, chair and/or voice tab/Visual Cue: Yellow wristband and red socks/Bed in lowest position, wheels locked, appropriate side rails in place/Call bell, personal items and telephone in reach/Instruct patient to call for assistance before getting out of bed or chair/Non-slip footwear when patient is out of bed/[place] to call system/Physically safe environment - no spills, clutter or unnecessary equipment/Purposeful Proactive Rounding/Room/bathroom lighting operational, light cord in reach

## 2022-01-22 LAB
ALBUMIN SERPL ELPH-MCNC: 3.9 G/DL — SIGNIFICANT CHANGE UP (ref 3.3–5)
ALP SERPL-CCNC: 85 U/L — SIGNIFICANT CHANGE UP (ref 40–120)
ALT FLD-CCNC: 103 U/L — HIGH (ref 4–33)
ANION GAP SERPL CALC-SCNC: 12 MMOL/L — SIGNIFICANT CHANGE UP (ref 7–14)
ANION GAP SERPL CALC-SCNC: 8 MMOL/L — SIGNIFICANT CHANGE UP (ref 7–14)
AST SERPL-CCNC: 166 U/L — HIGH (ref 4–32)
BILIRUB SERPL-MCNC: 1 MG/DL — SIGNIFICANT CHANGE UP (ref 0.2–1.2)
BLD GP AB SCN SERPL QL: NEGATIVE — SIGNIFICANT CHANGE UP
BUN SERPL-MCNC: 10 MG/DL — SIGNIFICANT CHANGE UP (ref 7–23)
BUN SERPL-MCNC: 12 MG/DL — SIGNIFICANT CHANGE UP (ref 7–23)
CALCIUM SERPL-MCNC: 8.4 MG/DL — SIGNIFICANT CHANGE UP (ref 8.4–10.5)
CALCIUM SERPL-MCNC: 8.4 MG/DL — SIGNIFICANT CHANGE UP (ref 8.4–10.5)
CHLORIDE SERPL-SCNC: 101 MMOL/L — SIGNIFICANT CHANGE UP (ref 98–107)
CHLORIDE SERPL-SCNC: 103 MMOL/L — SIGNIFICANT CHANGE UP (ref 98–107)
CO2 SERPL-SCNC: 20 MMOL/L — LOW (ref 22–31)
CO2 SERPL-SCNC: 25 MMOL/L — SIGNIFICANT CHANGE UP (ref 22–31)
CREAT SERPL-MCNC: 0.64 MG/DL — SIGNIFICANT CHANGE UP (ref 0.5–1.3)
CREAT SERPL-MCNC: 0.65 MG/DL — SIGNIFICANT CHANGE UP (ref 0.5–1.3)
GLUCOSE BLDC GLUCOMTR-MCNC: 123 MG/DL — HIGH (ref 70–99)
GLUCOSE BLDC GLUCOMTR-MCNC: 151 MG/DL — HIGH (ref 70–99)
GLUCOSE BLDC GLUCOMTR-MCNC: 180 MG/DL — HIGH (ref 70–99)
GLUCOSE BLDC GLUCOMTR-MCNC: 194 MG/DL — HIGH (ref 70–99)
GLUCOSE BLDC GLUCOMTR-MCNC: 230 MG/DL — HIGH (ref 70–99)
GLUCOSE SERPL-MCNC: 188 MG/DL — HIGH (ref 70–99)
GLUCOSE SERPL-MCNC: 270 MG/DL — HIGH (ref 70–99)
HCT VFR BLD CALC: 18.2 % — CRITICAL LOW (ref 34.5–45)
HCT VFR BLD CALC: 20.8 % — CRITICAL LOW (ref 34.5–45)
HCT VFR BLD CALC: 22.3 % — LOW (ref 34.5–45)
HCT VFR BLD CALC: 24 % — LOW (ref 34.5–45)
HGB BLD-MCNC: 6 G/DL — CRITICAL LOW (ref 11.5–15.5)
HGB BLD-MCNC: 6.9 G/DL — CRITICAL LOW (ref 11.5–15.5)
HGB BLD-MCNC: 7.5 G/DL — LOW (ref 11.5–15.5)
HGB BLD-MCNC: 7.6 G/DL — LOW (ref 11.5–15.5)
LACTATE SERPL-SCNC: 2.3 MMOL/L — HIGH (ref 0.5–2)
LACTATE SERPL-SCNC: 6 MMOL/L — CRITICAL HIGH (ref 0.5–2)
MAGNESIUM SERPL-MCNC: 1.9 MG/DL — SIGNIFICANT CHANGE UP (ref 1.6–2.6)
MAGNESIUM SERPL-MCNC: 2 MG/DL — SIGNIFICANT CHANGE UP (ref 1.6–2.6)
MCHC RBC-ENTMCNC: 24.2 PG — LOW (ref 27–34)
MCHC RBC-ENTMCNC: 24.5 PG — LOW (ref 27–34)
MCHC RBC-ENTMCNC: 24.6 PG — LOW (ref 27–34)
MCHC RBC-ENTMCNC: 24.9 PG — LOW (ref 27–34)
MCHC RBC-ENTMCNC: 31.7 GM/DL — LOW (ref 32–36)
MCHC RBC-ENTMCNC: 33 GM/DL — SIGNIFICANT CHANGE UP (ref 32–36)
MCHC RBC-ENTMCNC: 33.2 GM/DL — SIGNIFICANT CHANGE UP (ref 32–36)
MCHC RBC-ENTMCNC: 33.6 GM/DL — SIGNIFICANT CHANGE UP (ref 32–36)
MCV RBC AUTO: 73.8 FL — LOW (ref 80–100)
MCV RBC AUTO: 74.1 FL — LOW (ref 80–100)
MCV RBC AUTO: 74.6 FL — LOW (ref 80–100)
MCV RBC AUTO: 76.4 FL — LOW (ref 80–100)
NRBC # BLD: 0 /100 WBCS — SIGNIFICANT CHANGE UP
NRBC # FLD: 0 K/UL — SIGNIFICANT CHANGE UP
PHOSPHATE SERPL-MCNC: 2.4 MG/DL — LOW (ref 2.5–4.5)
PHOSPHATE SERPL-MCNC: 3.6 MG/DL — SIGNIFICANT CHANGE UP (ref 2.5–4.5)
PLATELET # BLD AUTO: 74 K/UL — LOW (ref 150–400)
PLATELET # BLD AUTO: 76 K/UL — LOW (ref 150–400)
PLATELET # BLD AUTO: 90 K/UL — LOW (ref 150–400)
PLATELET # BLD AUTO: 97 K/UL — LOW (ref 150–400)
POTASSIUM SERPL-MCNC: 5.1 MMOL/L — SIGNIFICANT CHANGE UP (ref 3.5–5.3)
POTASSIUM SERPL-MCNC: 5.3 MMOL/L — SIGNIFICANT CHANGE UP (ref 3.5–5.3)
POTASSIUM SERPL-SCNC: 5.1 MMOL/L — SIGNIFICANT CHANGE UP (ref 3.5–5.3)
POTASSIUM SERPL-SCNC: 5.3 MMOL/L — SIGNIFICANT CHANGE UP (ref 3.5–5.3)
PROT SERPL-MCNC: 6.6 G/DL — SIGNIFICANT CHANGE UP (ref 6–8.3)
RBC # BLD: 2.44 M/UL — LOW (ref 3.8–5.2)
RBC # BLD: 2.82 M/UL — LOW (ref 3.8–5.2)
RBC # BLD: 3.01 M/UL — LOW (ref 3.8–5.2)
RBC # BLD: 3.14 M/UL — LOW (ref 3.8–5.2)
RBC # FLD: 15 % — HIGH (ref 10.3–14.5)
RBC # FLD: 15.1 % — HIGH (ref 10.3–14.5)
RBC # FLD: 15.2 % — HIGH (ref 10.3–14.5)
RBC # FLD: 15.7 % — HIGH (ref 10.3–14.5)
RH IG SCN BLD-IMP: POSITIVE — SIGNIFICANT CHANGE UP
SODIUM SERPL-SCNC: 133 MMOL/L — LOW (ref 135–145)
SODIUM SERPL-SCNC: 136 MMOL/L — SIGNIFICANT CHANGE UP (ref 135–145)
WBC # BLD: 10.91 K/UL — HIGH (ref 3.8–10.5)
WBC # BLD: 8.15 K/UL — SIGNIFICANT CHANGE UP (ref 3.8–10.5)
WBC # BLD: 8.49 K/UL — SIGNIFICANT CHANGE UP (ref 3.8–10.5)
WBC # BLD: 8.89 K/UL — SIGNIFICANT CHANGE UP (ref 3.8–10.5)
WBC # FLD AUTO: 10.91 K/UL — HIGH (ref 3.8–10.5)
WBC # FLD AUTO: 8.15 K/UL — SIGNIFICANT CHANGE UP (ref 3.8–10.5)
WBC # FLD AUTO: 8.49 K/UL — SIGNIFICANT CHANGE UP (ref 3.8–10.5)
WBC # FLD AUTO: 8.89 K/UL — SIGNIFICANT CHANGE UP (ref 3.8–10.5)

## 2022-01-22 PROCEDURE — 99232 SBSQ HOSP IP/OBS MODERATE 35: CPT

## 2022-01-22 RX ORDER — HYDROMORPHONE HYDROCHLORIDE 2 MG/ML
0.25 INJECTION INTRAMUSCULAR; INTRAVENOUS; SUBCUTANEOUS ONCE
Refills: 0 | Status: DISCONTINUED | OUTPATIENT
Start: 2022-01-22 | End: 2022-01-22

## 2022-01-22 RX ORDER — INSULIN GLARGINE 100 [IU]/ML
10 INJECTION, SOLUTION SUBCUTANEOUS AT BEDTIME
Refills: 0 | Status: DISCONTINUED | OUTPATIENT
Start: 2022-01-22 | End: 2022-01-31

## 2022-01-22 RX ORDER — LANOLIN ALCOHOL/MO/W.PET/CERES
3 CREAM (GRAM) TOPICAL ONCE
Refills: 0 | Status: COMPLETED | OUTPATIENT
Start: 2022-01-22 | End: 2022-01-24

## 2022-01-22 RX ORDER — HYDROMORPHONE HYDROCHLORIDE 2 MG/ML
0.5 INJECTION INTRAMUSCULAR; INTRAVENOUS; SUBCUTANEOUS ONCE
Refills: 0 | Status: DISCONTINUED | OUTPATIENT
Start: 2022-01-22 | End: 2022-01-22

## 2022-01-22 RX ORDER — ALBUMIN HUMAN 25 %
500 VIAL (ML) INTRAVENOUS ONCE
Refills: 0 | Status: COMPLETED | OUTPATIENT
Start: 2022-01-22 | End: 2022-01-22

## 2022-01-22 RX ORDER — INSULIN GLARGINE 100 [IU]/ML
8 INJECTION, SOLUTION SUBCUTANEOUS ONCE
Refills: 0 | Status: COMPLETED | OUTPATIENT
Start: 2022-01-22 | End: 2022-01-22

## 2022-01-22 RX ORDER — ACETAMINOPHEN 500 MG
750 TABLET ORAL EVERY 6 HOURS
Refills: 0 | Status: COMPLETED | OUTPATIENT
Start: 2022-01-22 | End: 2022-01-23

## 2022-01-22 RX ORDER — HYDROMORPHONE HYDROCHLORIDE 2 MG/ML
0.25 INJECTION INTRAMUSCULAR; INTRAVENOUS; SUBCUTANEOUS EVERY 4 HOURS
Refills: 0 | Status: DISCONTINUED | OUTPATIENT
Start: 2022-01-22 | End: 2022-01-28

## 2022-01-22 RX ORDER — HYDROMORPHONE HYDROCHLORIDE 2 MG/ML
0.5 INJECTION INTRAMUSCULAR; INTRAVENOUS; SUBCUTANEOUS EVERY 4 HOURS
Refills: 0 | Status: DISCONTINUED | OUTPATIENT
Start: 2022-01-22 | End: 2022-01-28

## 2022-01-22 RX ADMIN — Medication 260 MILLIGRAM(S): at 06:26

## 2022-01-22 RX ADMIN — HYDROMORPHONE HYDROCHLORIDE 0.5 MILLIGRAM(S): 2 INJECTION INTRAMUSCULAR; INTRAVENOUS; SUBCUTANEOUS at 04:50

## 2022-01-22 RX ADMIN — Medication 300 MILLIGRAM(S): at 17:01

## 2022-01-22 RX ADMIN — Medication 260 MILLIGRAM(S): at 12:01

## 2022-01-22 RX ADMIN — HYDROMORPHONE HYDROCHLORIDE 0.25 MILLIGRAM(S): 2 INJECTION INTRAMUSCULAR; INTRAVENOUS; SUBCUTANEOUS at 19:56

## 2022-01-22 RX ADMIN — PANTOPRAZOLE SODIUM 40 MILLIGRAM(S): 20 TABLET, DELAYED RELEASE ORAL at 12:00

## 2022-01-22 RX ADMIN — INSULIN GLARGINE 8 UNIT(S): 100 INJECTION, SOLUTION SUBCUTANEOUS at 08:42

## 2022-01-22 RX ADMIN — SODIUM CHLORIDE 3 MILLILITER(S): 9 INJECTION INTRAMUSCULAR; INTRAVENOUS; SUBCUTANEOUS at 05:02

## 2022-01-22 RX ADMIN — ONDANSETRON 4 MILLIGRAM(S): 8 TABLET, FILM COATED ORAL at 08:39

## 2022-01-22 RX ADMIN — SODIUM CHLORIDE 75 MILLILITER(S): 9 INJECTION, SOLUTION INTRAVENOUS at 07:21

## 2022-01-22 RX ADMIN — ONDANSETRON 4 MILLIGRAM(S): 8 TABLET, FILM COATED ORAL at 02:52

## 2022-01-22 RX ADMIN — HYDROMORPHONE HYDROCHLORIDE 0.5 MILLIGRAM(S): 2 INJECTION INTRAMUSCULAR; INTRAVENOUS; SUBCUTANEOUS at 11:25

## 2022-01-22 RX ADMIN — SODIUM CHLORIDE 3 MILLILITER(S): 9 INJECTION INTRAMUSCULAR; INTRAVENOUS; SUBCUTANEOUS at 21:43

## 2022-01-22 RX ADMIN — HYDROMORPHONE HYDROCHLORIDE 0.5 MILLIGRAM(S): 2 INJECTION INTRAMUSCULAR; INTRAVENOUS; SUBCUTANEOUS at 04:35

## 2022-01-22 RX ADMIN — Medication 650 MILLIGRAM(S): at 12:23

## 2022-01-22 RX ADMIN — HYDROMORPHONE HYDROCHLORIDE 0.25 MILLIGRAM(S): 2 INJECTION INTRAMUSCULAR; INTRAVENOUS; SUBCUTANEOUS at 20:00

## 2022-01-22 RX ADMIN — Medication 6: at 00:10

## 2022-01-22 RX ADMIN — Medication 750 MILLIGRAM(S): at 17:06

## 2022-01-22 RX ADMIN — SODIUM CHLORIDE 75 MILLILITER(S): 9 INJECTION, SOLUTION INTRAVENOUS at 19:56

## 2022-01-22 RX ADMIN — Medication 4: at 06:30

## 2022-01-22 RX ADMIN — HYDROMORPHONE HYDROCHLORIDE 0.5 MILLIGRAM(S): 2 INJECTION INTRAMUSCULAR; INTRAVENOUS; SUBCUTANEOUS at 16:44

## 2022-01-22 RX ADMIN — Medication 260 MILLIGRAM(S): at 00:10

## 2022-01-22 RX ADMIN — HYDROMORPHONE HYDROCHLORIDE 0.5 MILLIGRAM(S): 2 INJECTION INTRAMUSCULAR; INTRAVENOUS; SUBCUTANEOUS at 16:24

## 2022-01-22 RX ADMIN — HYDROMORPHONE HYDROCHLORIDE 0.5 MILLIGRAM(S): 2 INJECTION INTRAMUSCULAR; INTRAVENOUS; SUBCUTANEOUS at 08:49

## 2022-01-22 RX ADMIN — SODIUM CHLORIDE 3 MILLILITER(S): 9 INJECTION INTRAMUSCULAR; INTRAVENOUS; SUBCUTANEOUS at 14:00

## 2022-01-22 RX ADMIN — Medication 650 MILLIGRAM(S): at 06:40

## 2022-01-22 RX ADMIN — Medication 2: at 12:41

## 2022-01-22 RX ADMIN — Medication 300 MILLIGRAM(S): at 23:41

## 2022-01-22 RX ADMIN — Medication 2: at 17:05

## 2022-01-22 RX ADMIN — SODIUM CHLORIDE 75 MILLILITER(S): 9 INJECTION, SOLUTION INTRAVENOUS at 00:09

## 2022-01-22 RX ADMIN — INSULIN GLARGINE 10 UNIT(S): 100 INJECTION, SOLUTION SUBCUTANEOUS at 23:42

## 2022-01-22 RX ADMIN — SODIUM CHLORIDE 75 MILLILITER(S): 9 INJECTION, SOLUTION INTRAVENOUS at 08:39

## 2022-01-22 RX ADMIN — Medication 650 MILLIGRAM(S): at 00:30

## 2022-01-22 RX ADMIN — Medication 250 MILLILITER(S): at 11:11

## 2022-01-22 NOTE — PROGRESS NOTE ADULT - ASSESSMENT
57F with hx of ampullary cancer s/p Whipple procedue 1/11/19, repair of gastroduodenal artery bleed 1/18/19 and drainage of abscess/repair of abdominal wall on 1/21/19, incisional hernia s/p repair today with concern for abdominal compartment syndrome postoperatively.     PLAN:   Neurologic:  -Currently AAOx3  -s/p duramorph spinal intraop w 0.15 mcg  -dilaudid PRN, IV tylenol standing   -consider IV PCA when more alert/as relief from duramorph wanes   -monitor for increased pain, altered mentation     Respiratory:   -mildly tachypneic, no increased WOB   -continue to monitor respiratory status    Cardiovascular:   -mildly tachycardic to low 100s without hx of ischemic dz   -pocus to evaluate fluid status   -reevaluate after fluid bolus   -lactate 8 --> trend q6hr.     Gastrointestinal/Nutrition:   -NG tube in place  -NPO  -No active issues     Renal/Genitourinary:   -urine output improved s/p fluid bolus  -monitor UOP  -will attempt to obtain bladder pressure per plastics request though utility will be limited given patient is extubated and spontaneously ventilating     Hematologic:   -no active tissues  -monitor H&H    Infectious Disease:   -no active issues  -mild postoperative leukocytosis, likely reactive   -monitor for signs of fever     Lines/Tubes:  PIV, sinha, NG tube     Endocrine:   -no active issues     Disposition:   SICU   57F with hx of ampullary cancer s/p Whipple procedue 1/11/19, repair of gastroduodenal artery bleed 1/18/19 and drainage of abscess/repair of abdominal wall on 1/21/19, incisional hernia s/p repair today with concern for abdominal compartment syndrome postoperatively.     Interval Events:  - S/p Ex Lap, GRISEL, repair of ventral hernia w/ mesh and component separation 1/21  - Lactate downtrended to 2.3 post 500 cc albumin bolus  - H/H Downtrending; Hgb 6.9; Repeating w/ T&S  - PT Consult  - F/U D Team regarding Swanson  - F/U Pain for PCA      PLAN:   Neurologic:  - Currently AAOx3  - s/p duramorph spinal intraop w 0.15 mcg  - dilaudid PRN, IV tylenol standing  - Can consider PCA for pain control; Pain following    Respiratory:   - No active issues  - Saturating well on room air    Cardiovascular:   - Hx HLD on Atorvastatin 10 mg daily; no other cardiac hx  - Mildly tachycardic to low 100s overnight  - Lactate downtrending; to 2.3     Gastrointestinal/Nutrition:   - Incisional hernia s/p lysis of adhesions and ventral hernia repair with mesh on 1/21  - Non paralyzed bladder pressures 3  - NGT/NPO; LIWS    Renal/Genitourinary:   - Baseline creatinine 0.5; adequate UOP last 24 hours  - Trend creatinine and UOP    Hematologic:   - Hemoglobin drop from 10.8 pre op to 7.8 post op; now 6.9  - No recent coags  - Lovenox for DVT PPx  - F/U CBC; Type and screen sent    Infectious Disease:   - Afebrile and w/ mild leukocytosis; likely reactive  - Monitor off antibiotics    Endocrine:   - DM2 on Glimepiride 1 mg daily at home; A1c 7.0 (8.4 in August)  - Lantus 8 units this AM; start 10 units nightly with correctional scale while NPO  - Monitor glucose levels and adjust accordingly    Lines/Tubes:  PIV, NGT    Disposition:   SICU

## 2022-01-22 NOTE — PROGRESS NOTE ADULT - SUBJECTIVE AND OBJECTIVE BOX
Plastic Surgery Progress Note (pg [id number]: [id number], [facility]: [phone])    SUBJECTIVE  The patient was seen and examined this morning during rounds. POD1 s/p ventral hernia repair with component separation and retro-rectus mesh placement 1/21/2022. Concern for abdominal compartment syndrome but bladder pressure was 3 mm Hg. Lactate elevated to 8 yesterday afternoon, trended downward to 6 on most recent labs. Good urine output. No acute events overnight.    OBJECTIVE  ___________________________________________________  VITAL SIGNS / I&O's   Vital Signs Last 24 Hrs  T(C): 37 (22 Jan 2022 08:00), Max: 37.3 (22 Jan 2022 00:00)  T(F): 98.6 (22 Jan 2022 08:00), Max: 99.1 (22 Jan 2022 00:00)  HR: 88 (22 Jan 2022 08:00) (87 - 123)  BP: 111/55 (22 Jan 2022 08:00) (86/46 - 166/76)  BP(mean): 68 (22 Jan 2022 08:00) (54 - 98)  RR: 21 (22 Jan 2022 08:00) (8 - 24)  SpO2: 100% (22 Jan 2022 08:00) (93% - 100%)      21 Jan 2022 07:01  -  22 Jan 2022 07:00  --------------------------------------------------------  IN:    IV PiggyBack: 500 mL    Lactated Ringers: 750 mL    Lactated Ringers: 525 mL    Lactated Ringers Bolus: 2000 mL  Total IN: 3775 mL    OUT:    Bulb (mL): 190 mL    Bulb (mL): 240 mL    Indwelling Catheter - Urethral (mL): 1345 mL    Nasogastric/Oral tube (mL): 220 mL  Total OUT: 1995 mL    Total NET: 1780 mL      22 Jan 2022 07:01  -  22 Jan 2022 08:38  --------------------------------------------------------  IN:    Lactated Ringers: 75 mL  Total IN: 75 mL    OUT:    Indwelling Catheter - Urethral (mL): 75 mL  Total OUT: 75 mL    Total NET: 0 mL        ___________________________________________________  PHYSICAL EXAM    -- CONSTITUTIONAL: appears in mild discomfort  -- NEURO: Awake, alert  -- PULM: Non-labored respirations  -- ABDOMEN: soft, mildly tender, mildly distended but improved from yesterday. Dressing c/d/i, no collections appreciated. Drains serosanguinous.    ___________________________________________________  LABS                        7.6    8.89  )-----------( 97       ( 22 Jan 2022 01:42 )             24.0     22 Jan 2022 01:42    133    |  101    |  10     ----------------------------<  270    5.3     |  20     |  0.64     Ca    8.4        22 Jan 2022 01:42  Phos  3.6       22 Jan 2022 01:42  Mg     2.00      22 Jan 2022 01:42    TPro  6.6    /  Alb  3.9    /  TBili  1.0    /  DBili  x      /  AST  166    /  ALT  103    /  AlkPhos  85     22 Jan 2022 01:42      CAPILLARY BLOOD GLUCOSE      POCT Blood Glucose.: 230 mg/dL (22 Jan 2022 06:24)  POCT Blood Glucose.: 287 mg/dL (21 Jan 2022 23:47)  POCT Blood Glucose.: 255 mg/dL (21 Jan 2022 22:22)  POCT Blood Glucose.: 283 mg/dL (21 Jan 2022 18:24)        __________________________________________________  MEDICATIONS  (STANDING):  acetaminophen   IVPB .. 650 milliGRAM(s) IV Intermittent every 6 hours  dextrose 50% Injectable 12.5 Gram(s) IV Push once  dextrose 50% Injectable 25 Gram(s) IV Push once  enoxaparin Injectable 40 milliGRAM(s) SubCutaneous every 24 hours  influenza   Vaccine 0.5 milliLiter(s) IntraMuscular once  insulin glargine Injectable (LANTUS) 10 Unit(s) SubCutaneous at bedtime  insulin glargine Injectable (LANTUS) 8 Unit(s) SubCutaneous once  insulin lispro (ADMELOG) corrective regimen sliding scale   SubCutaneous every 6 hours  lactated ringers. 1000 milliLiter(s) (75 mL/Hr) IV Continuous <Continuous>  pantoprazole  Injectable 40 milliGRAM(s) IV Push daily  sodium chloride 0.9% lock flush 3 milliLiter(s) IV Push every 8 hours    MEDICATIONS  (PRN):  HYDROmorphone  Injectable 0.5 milliGRAM(s) IV Push every 6 hours PRN Moderate Pain (4 - 6)  naloxone Injectable 0.1 milliGRAM(s) IV Push every 3 minutes PRN For ANY of the following changes in patient status:  A. RR LESS THAN 10 breaths per minute, B. Oxygen saturation LESS THAN 90%, C. Sedation score of 6  ondansetron Injectable 4 milliGRAM(s) IV Push every 6 hours PRN Nausea

## 2022-01-22 NOTE — PROGRESS NOTE ADULT - SUBJECTIVE AND OBJECTIVE BOX
Subjective:   Patient seen and examined at bedside.     Objective:   Vital Signs Last 24 Hrs  T(C): 36.8 (22 Jan 2022 04:00), Max: 37.3 (22 Jan 2022 00:00)  T(F): 98.3 (22 Jan 2022 04:00), Max: 99.1 (22 Jan 2022 00:00)  HR: 89 (22 Jan 2022 06:00) (88 - 123)  BP: 119/61 (22 Jan 2022 06:00) (86/46 - 166/76)  BP(mean): 74 (22 Jan 2022 06:00) (54 - 98)  RR: 21 (22 Jan 2022 06:00) (8 - 24)  SpO2: 100% (22 Jan 2022 06:00) (93% - 100%)  I&O's Summary    21 Jan 2022 07:01  -  22 Jan 2022 06:40  --------------------------------------------------------  IN: 3775 mL / OUT: 1995 mL / NET: 1780 mL        Physical Exam:  General: NAD, resting comfortably in bed  Pulmonary: Nonlabored breathing, no respiratory distress  Cardiovascular: NSR  Abdominal: soft, NT/ND. Midline incision with two drains placed.   Extremities: [facility]    LABS:                        7.6    8.89  )-----------( 97       ( 22 Jan 2022 01:42 )             24.0     01-22    133<L>  |  101  |  10  ----------------------------<  270<H>  5.3   |  20<L>  |  0.64    Ca    8.4      22 Jan 2022 01:42  Phos  3.6     01-22  Mg     2.00     01-22    TPro  6.6  /  Alb  3.9  /  TBili  1.0  /  DBili  x   /  AST  166<H>  /  ALT  103<H>  /  AlkPhos  85  01-22        enoxaparin Injectable 40      Radiology and Additional Studies:    Assessment and Plan:

## 2022-01-22 NOTE — PROGRESS NOTE ADULT - ASSESSMENT
The patient is a 57y Female who is now several hours post-op from a ventral hernia repair with mesh     Plan:  - SICU called to evaluate   - Pain control as needed  - OOB and ambulating as tolerated  - F/u AM labs.

## 2022-01-22 NOTE — PROGRESS NOTE ADULT - SUBJECTIVE AND OBJECTIVE BOX
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  Pt with c/o abdominal hernia which has increased in size and is causing discomfort. Pt now s/p Exploratory Laparotomy Lysis of Adhesions, Repair of Ventral Hernia, Component Separation.   Intraoperatively upon initial attempt at closure, there were increased peak pressures on the ventilator; subsequently the rectus sheath was released with improvement in peak pressures. Patient subsequently in PACU for approximately 6 hours with persistent moderate pain, and decreased urine output at approx 16:00 which improved w fluid bolus and albumin.     Interval Events:   -Serial abdominal exam unchanged  -2L LR bolus  -bladder pressure obtained, 3, low suspicion for abdominal compartment syndrome  -No acute events overnight    VITAL SIGNS, INS/OUTS (last 24 hours):  --------------------------------------------------------------------------------------  T(C): 37.2 (01-21-22 @ 23:15), Max: 37.2 (01-21-22 @ 06:31)  HR: 99 (01-22-22 @ 01:00) (75 - 123)  BP: 118/60 (01-22-22 @ 01:00) (86/46 - 166/76)  BP(mean): 72 (01-22-22 @ 01:00) (54 - 98)  RR: 18 (01-22-22 @ 01:00) (8 - 24)  SpO2: 100% (01-22-22 @ 01:00) (93% - 100%)    CAPILLARY BLOOD GLUCOSE  POCT Blood Glucose.: 287 mg/dL (21 Jan 2022 23:47)  POCT Blood Glucose.: 255 mg/dL (21 Jan 2022 22:22)  POCT Blood Glucose.: 283 mg/dL (21 Jan 2022 18:24)   N/A      01-21 @ 07:01  -  01-22 @ 02:07  --------------------------------------------------------  IN:    IV PiggyBack: 500 mL    Lactated Ringers: 750 mL    Lactated Ringers: 150 mL    Lactated Ringers Bolus: 2000 mL  Total IN: 3400 mL    OUT:    Bulb (mL): 120 mL    Bulb (mL): 180 mL    Indwelling Catheter - Urethral (mL): 1055 mL    Nasogastric/Oral tube (mL): 20 mL  Total OUT: 1375 mL    Total NET: 2025 mL  --------------------------------------------------------------------------------------    EXAM:  General/Neuro  Exam: Normal, AAOx3, in mild-moderate discomfort with intermittent groaning     Respiratory  Exam: Lungs clear to auscultation, Normal expansion/effort. Mildly tachypneic to mid 20s.     Cardiovascular  Exam: S1, S2.  Sinus tachycardia. No significant peripheral edema appreciated   Cardiac Rhythm: sinus tachycardia   ECHO:  2019 TTE demonstrates EF 74% w no significant valvular dz     GI  Exam: Abdomen soft in all 4 quadrants, slightly more convex on L side vs R side, dressing in place, drains w serosanguinous fluid.  MIldly TTP.       METABOLIC/FLUIDS/ELECTROLYTES  lactated ringers. 1000 milliLiter(s) IV Continuous <Continuous>  sodium chloride 0.9% lock flush 3 milliLiter(s) IV Push every 8 hours      HEMATOLOGIC  [x] DVT Prophylaxis: enoxaparin Injectable 40 milliGRAM(s) SubCutaneous every 24 hours    Transfusions:	[] PRBC	[] Platelets		[] FFP	[] Cryoprecipitate    INFECTIOUS DISEASE  Antimicrobials/Immunologic Medications:  influenza   Vaccine 0.5 milliLiter(s) IntraMuscular once    Extremities  Exam: Extremities warm, pink, well-perfused.      Skin:  Exam: Good skin turgor, no skin breakdown.        LABS  --------------------------------------------------------------------------------------  CBC (01-22 @ 01:42)                              7.6<L>                         8.89    )----------------(  --         --    % Neutrophils, --    % Lymphocytes, ANC: --                                  24.0<L>  CBC (01-21 @ 20:41)                              7.8<L>                         11.12<H>  )----------------(  118<L>     --    % Neutrophils, --    % Lymphocytes, ANC: --                                  25.3<L>    BMP (01-21 @ 20:41)             135     |  101     |  10    		Ca++ --      Ca 8.4                ---------------------------------( 281<H>		Mg 2.40               5.0     |  15<L>   |  0.66  			Ph 4.8<H>  BMP (01-21 @ 17:48)             136     |  101     |  11    		Ca++ --      Ca 8.5                ---------------------------------( 284<H>		Mg 1.40<L>             5.3     |  16<L>   |  0.72  			Ph 5.6<H>    LFTs (01-21 @ 20:41)      TPro 7.0 / Alb 4.2 / TBili 1.1 / DBili -- / AST 92<H> / ALT 63<H> / AlkPhos 93        ABG (01-21 @ 20:41)      /  /  /  /  / %     Lactate:  8.3<HH>   ABG (01-21 @ 20:29)      /  /  /  /  / %     Lactate:  8.7<HH>   --------------------------------------------------------------------------------------    IMAGING STUDIES     SICU Daily PROGRESS NOTE  ===============================  HPI  56 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  Pt with c/o abdominal hernia which has increased in size and is causing discomfort. Pt now s/p Exploratory Laparotomy Lysis of Adhesions, Repair of Ventral Hernia, Component Separation.   Intraoperatively upon initial attempt at closure, there were increased peak pressures on the ventilator; subsequently the rectus sheath was released with improvement in peak pressures. Patient subsequently in PACU for approximately 6 hours with persistent moderate pain, and decreased urine output at approx 16:00 which improved w fluid bolus and albumin.     Interval Events:   - S/p Ex Lap, GRISEL, repair of ventral hernia w/ mesh and component separation 1/21  - Lactate downtrended to 2.3 post 500 cc albumin bolus  - H/H Downtrending; Hgb 6.9; Repeating w/ T&S  - PT Consult  - F/U Pain for PCA    VITAL SIGNS, INS/OUTS (last 24 hours):  --------------------------------------------------------------------------------------  T(C): 37.2 (01-21-22 @ 23:15), Max: 37.2 (01-21-22 @ 06:31)  HR: 99 (01-22-22 @ 01:00) (75 - 123)  BP: 118/60 (01-22-22 @ 01:00) (86/46 - 166/76)  BP(mean): 72 (01-22-22 @ 01:00) (54 - 98)  RR: 18 (01-22-22 @ 01:00) (8 - 24)  SpO2: 100% (01-22-22 @ 01:00) (93% - 100%)    CAPILLARY BLOOD GLUCOSE  POCT Blood Glucose.: 287 mg/dL (21 Jan 2022 23:47)  POCT Blood Glucose.: 255 mg/dL (21 Jan 2022 22:22)  POCT Blood Glucose.: 283 mg/dL (21 Jan 2022 18:24)   N/A      01-21 @ 07:01  -  01-22 @ 02:07  --------------------------------------------------------  IN:    IV PiggyBack: 500 mL    Lactated Ringers: 750 mL    Lactated Ringers: 150 mL    Lactated Ringers Bolus: 2000 mL  Total IN: 3400 mL    OUT:    Bulb (mL): 120 mL    Bulb (mL): 180 mL    Indwelling Catheter - Urethral (mL): 1055 mL    Nasogastric/Oral tube (mL): 20 mL  Total OUT: 1375 mL    Total NET: 2025 mL  --------------------------------------------------------------------------------------    EXAM:  General/Neuro  Exam: Normal, AAOx3, in mild-moderate discomfort with intermittent groaning     Respiratory  Exam: Lungs clear to auscultation, Normal expansion/effort. Mildly tachypneic to mid 20s.     Cardiovascular  Exam: S1, S2.  Sinus tachycardia. No significant peripheral edema appreciated   Cardiac Rhythm: sinus tachycardia   ECHO:  2019 TTE demonstrates EF 74% w no significant valvular dz     GI  Exam: Abdomen soft in all 4 quadrants, slightly more convex on L side vs R side, dressing in place, drains w serosanguinous fluid.  MIldly TTP.       METABOLIC/FLUIDS/ELECTROLYTES  lactated ringers. 1000 milliLiter(s) IV Continuous <Continuous>  sodium chloride 0.9% lock flush 3 milliLiter(s) IV Push every 8 hours      HEMATOLOGIC  [x] DVT Prophylaxis: enoxaparin Injectable 40 milliGRAM(s) SubCutaneous every 24 hours    Transfusions:	[] PRBC	[] Platelets		[] FFP	[] Cryoprecipitate    INFECTIOUS DISEASE  Antimicrobials/Immunologic Medications:  influenza   Vaccine 0.5 milliLiter(s) IntraMuscular once    Extremities  Exam: Extremities warm, pink, well-perfused.      Skin:  Exam: Good skin turgor, no skin breakdown.        LABS  --------------------------------------------------------------------------------------  CBC (01-22 @ 01:42)                              7.6<L>                         8.89    )----------------(  --         --    % Neutrophils, --    % Lymphocytes, ANC: --                                  24.0<L>  CBC (01-21 @ 20:41)                              7.8<L>                         11.12<H>  )----------------(  118<L>     --    % Neutrophils, --    % Lymphocytes, ANC: --                                  25.3<L>    BMP (01-21 @ 20:41)             135     |  101     |  10    		Ca++ --      Ca 8.4                ---------------------------------( 281<H>		Mg 2.40               5.0     |  15<L>   |  0.66  			Ph 4.8<H>  BMP (01-21 @ 17:48)             136     |  101     |  11    		Ca++ --      Ca 8.5                ---------------------------------( 284<H>		Mg 1.40<L>             5.3     |  16<L>   |  0.72  			Ph 5.6<H>    LFTs (01-21 @ 20:41)      TPro 7.0 / Alb 4.2 / TBili 1.1 / DBili -- / AST 92<H> / ALT 63<H> / AlkPhos 93        ABG (01-21 @ 20:41)      /  /  /  /  / %     Lactate:  8.3<HH>   ABG (01-21 @ 20:29)      /  /  /  /  / %     Lactate:  8.7<HH>   --------------------------------------------------------------------------------------    IMAGING STUDIES

## 2022-01-22 NOTE — PROGRESS NOTE ADULT - SUBJECTIVE AND OBJECTIVE BOX
Anesthesia Pain Management Service    SUBJECTIVE: Patient s/p spinal morphine. Patient nauseous at the time, states it hurts when she is vomiting. Patient denies headache, numbness and tingling.   Pain Scale Score: 8/10 Refer to charted pain scores    THERAPY:    s/p spinal PF morphine yesterday.      MEDICATIONS  (STANDING):  acetaminophen   IVPB .. 650 milliGRAM(s) IV Intermittent every 6 hours  dextrose 50% Injectable 12.5 Gram(s) IV Push once  dextrose 50% Injectable 25 Gram(s) IV Push once  enoxaparin Injectable 40 milliGRAM(s) SubCutaneous every 24 hours  influenza   Vaccine 0.5 milliLiter(s) IntraMuscular once  insulin glargine Injectable (LANTUS) 10 Unit(s) SubCutaneous at bedtime  insulin lispro (ADMELOG) corrective regimen sliding scale   SubCutaneous every 6 hours  lactated ringers. 1000 milliLiter(s) (75 mL/Hr) IV Continuous <Continuous>  pantoprazole  Injectable 40 milliGRAM(s) IV Push daily  sodium chloride 0.9% lock flush 3 milliLiter(s) IV Push every 8 hours    MEDICATIONS  (PRN):  HYDROmorphone  Injectable 0.5 milliGRAM(s) IV Push every 6 hours PRN Moderate Pain (4 - 6)  naloxone Injectable 0.1 milliGRAM(s) IV Push every 3 minutes PRN For ANY of the following changes in patient status:  A. RR LESS THAN 10 breaths per minute, B. Oxygen saturation LESS THAN 90%, C. Sedation score of 6  ondansetron Injectable 4 milliGRAM(s) IV Push every 6 hours PRN Nausea      OBJECTIVE: Patient sitting up in bed.    Sedation Score:	[ x] Alert	[ ] Drowsy	[ ] Arousable	[ ] Asleep	[ ] Unresponsive    Side Effects:	[ x] None	[ ] Nausea	[ ] Vomiting	[ ] Pruritus  		  [ ] Weakness		[ ] Numbness	[ ] Other:    Vital Signs Last 24 Hrs  T(C): 37 (22 Jan 2022 08:00), Max: 37.3 (22 Jan 2022 00:00)  T(F): 98.6 (22 Jan 2022 08:00), Max: 99.1 (22 Jan 2022 00:00)  HR: 88 (22 Jan 2022 08:00) (87 - 123)  BP: 111/55 (22 Jan 2022 08:00) (86/46 - 166/76)  BP(mean): 68 (22 Jan 2022 08:00) (54 - 98)  RR: 21 (22 Jan 2022 08:00) (8 - 24)  SpO2: 100% (22 Jan 2022 08:00) (93% - 100%)    ASSESSMENT/ PLAN  [x ] Patient transitioned to prn analgesics  [x] Pain management per primary service, pain service to sign off   [x]Documentation and Verification of current medications     Comments: PRN Oral/IV opioids and/or non-opioid Adjuvant analgesics to be used at this point. Discussed with SICU team, team does not want IV PCA, will use IV Pushes PRN for pain management as per team.    Progress Note written now but Patient was seen earlier.

## 2022-01-22 NOTE — PROGRESS NOTE ADULT - ASSESSMENT
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 Admitted to SICU for concern of abdominal compartment syndrome. Serial exams stable, bladder pressure 3 mm Hg.  - continue abdominal binder  - continue JOURDAN drains  - serial exams per surgery/sicu  - okay for chemo VTE ppx from PRS standpoint  - will continue to follow    Brandyn Barriga MD PGY5  Plastic Surgery (pg [facility]: [id number], [facility]: [phone])

## 2022-01-23 LAB
ANION GAP SERPL CALC-SCNC: 7 MMOL/L — SIGNIFICANT CHANGE UP (ref 7–14)
BUN SERPL-MCNC: 11 MG/DL — SIGNIFICANT CHANGE UP (ref 7–23)
CALCIUM SERPL-MCNC: 8.5 MG/DL — SIGNIFICANT CHANGE UP (ref 8.4–10.5)
CHLORIDE SERPL-SCNC: 100 MMOL/L — SIGNIFICANT CHANGE UP (ref 98–107)
CO2 SERPL-SCNC: 27 MMOL/L — SIGNIFICANT CHANGE UP (ref 22–31)
CREAT SERPL-MCNC: 0.66 MG/DL — SIGNIFICANT CHANGE UP (ref 0.5–1.3)
GLUCOSE BLDC GLUCOMTR-MCNC: 113 MG/DL — HIGH (ref 70–99)
GLUCOSE BLDC GLUCOMTR-MCNC: 118 MG/DL — HIGH (ref 70–99)
GLUCOSE BLDC GLUCOMTR-MCNC: 141 MG/DL — HIGH (ref 70–99)
GLUCOSE SERPL-MCNC: 135 MG/DL — HIGH (ref 70–99)
HCT VFR BLD CALC: 24.5 % — LOW (ref 34.5–45)
HGB BLD-MCNC: 7.9 G/DL — LOW (ref 11.5–15.5)
LACTATE SERPL-SCNC: 1.3 MMOL/L — SIGNIFICANT CHANGE UP (ref 0.5–2)
MAGNESIUM SERPL-MCNC: 2 MG/DL — SIGNIFICANT CHANGE UP (ref 1.6–2.6)
MCHC RBC-ENTMCNC: 24.5 PG — LOW (ref 27–34)
MCHC RBC-ENTMCNC: 32.2 GM/DL — SIGNIFICANT CHANGE UP (ref 32–36)
MCV RBC AUTO: 75.9 FL — LOW (ref 80–100)
NRBC # BLD: 0 /100 WBCS — SIGNIFICANT CHANGE UP
NRBC # FLD: 0 K/UL — SIGNIFICANT CHANGE UP
PHOSPHATE SERPL-MCNC: 1.6 MG/DL — LOW (ref 2.5–4.5)
PLATELET # BLD AUTO: 81 K/UL — LOW (ref 150–400)
POTASSIUM SERPL-MCNC: 4.2 MMOL/L — SIGNIFICANT CHANGE UP (ref 3.5–5.3)
POTASSIUM SERPL-SCNC: 4.2 MMOL/L — SIGNIFICANT CHANGE UP (ref 3.5–5.3)
RBC # BLD: 3.23 M/UL — LOW (ref 3.8–5.2)
RBC # FLD: 15.8 % — HIGH (ref 10.3–14.5)
SODIUM SERPL-SCNC: 134 MMOL/L — LOW (ref 135–145)
WBC # BLD: 8.92 K/UL — SIGNIFICANT CHANGE UP (ref 3.8–10.5)
WBC # FLD AUTO: 8.92 K/UL — SIGNIFICANT CHANGE UP (ref 3.8–10.5)

## 2022-01-23 PROCEDURE — 99232 SBSQ HOSP IP/OBS MODERATE 35: CPT | Mod: 25

## 2022-01-23 RX ORDER — POTASSIUM PHOSPHATE, MONOBASIC POTASSIUM PHOSPHATE, DIBASIC 236; 224 MG/ML; MG/ML
30 INJECTION, SOLUTION INTRAVENOUS ONCE
Refills: 0 | Status: COMPLETED | OUTPATIENT
Start: 2022-01-23 | End: 2022-01-23

## 2022-01-23 RX ORDER — HEPARIN SODIUM 5000 [USP'U]/ML
5000 INJECTION INTRAVENOUS; SUBCUTANEOUS EVERY 8 HOURS
Refills: 0 | Status: DISCONTINUED | OUTPATIENT
Start: 2022-01-23 | End: 2022-01-25

## 2022-01-23 RX ORDER — ACETAMINOPHEN 500 MG
1000 TABLET ORAL EVERY 6 HOURS
Refills: 0 | Status: COMPLETED | OUTPATIENT
Start: 2022-01-23 | End: 2022-01-24

## 2022-01-23 RX ORDER — ENOXAPARIN SODIUM 100 MG/ML
40 INJECTION SUBCUTANEOUS EVERY 24 HOURS
Refills: 0 | Status: DISCONTINUED | OUTPATIENT
Start: 2022-01-23 | End: 2022-01-23

## 2022-01-23 RX ADMIN — HEPARIN SODIUM 5000 UNIT(S): 5000 INJECTION INTRAVENOUS; SUBCUTANEOUS at 13:20

## 2022-01-23 RX ADMIN — INSULIN GLARGINE 10 UNIT(S): 100 INJECTION, SOLUTION SUBCUTANEOUS at 22:56

## 2022-01-23 RX ADMIN — SODIUM CHLORIDE 3 MILLILITER(S): 9 INJECTION INTRAMUSCULAR; INTRAVENOUS; SUBCUTANEOUS at 22:43

## 2022-01-23 RX ADMIN — Medication 300 MILLIGRAM(S): at 11:15

## 2022-01-23 RX ADMIN — Medication 750 MILLIGRAM(S): at 00:00

## 2022-01-23 RX ADMIN — Medication 750 MILLIGRAM(S): at 11:46

## 2022-01-23 RX ADMIN — HYDROMORPHONE HYDROCHLORIDE 0.25 MILLIGRAM(S): 2 INJECTION INTRAMUSCULAR; INTRAVENOUS; SUBCUTANEOUS at 05:38

## 2022-01-23 RX ADMIN — Medication 750 MILLIGRAM(S): at 05:45

## 2022-01-23 RX ADMIN — HYDROMORPHONE HYDROCHLORIDE 0.25 MILLIGRAM(S): 2 INJECTION INTRAMUSCULAR; INTRAVENOUS; SUBCUTANEOUS at 09:10

## 2022-01-23 RX ADMIN — HYDROMORPHONE HYDROCHLORIDE 0.25 MILLIGRAM(S): 2 INJECTION INTRAMUSCULAR; INTRAVENOUS; SUBCUTANEOUS at 00:05

## 2022-01-23 RX ADMIN — HYDROMORPHONE HYDROCHLORIDE 0.25 MILLIGRAM(S): 2 INJECTION INTRAMUSCULAR; INTRAVENOUS; SUBCUTANEOUS at 08:57

## 2022-01-23 RX ADMIN — PANTOPRAZOLE SODIUM 40 MILLIGRAM(S): 20 TABLET, DELAYED RELEASE ORAL at 11:36

## 2022-01-23 RX ADMIN — HYDROMORPHONE HYDROCHLORIDE 0.25 MILLIGRAM(S): 2 INJECTION INTRAMUSCULAR; INTRAVENOUS; SUBCUTANEOUS at 13:15

## 2022-01-23 RX ADMIN — HYDROMORPHONE HYDROCHLORIDE 0.5 MILLIGRAM(S): 2 INJECTION INTRAMUSCULAR; INTRAVENOUS; SUBCUTANEOUS at 22:30

## 2022-01-23 RX ADMIN — HYDROMORPHONE HYDROCHLORIDE 0.5 MILLIGRAM(S): 2 INJECTION INTRAMUSCULAR; INTRAVENOUS; SUBCUTANEOUS at 23:00

## 2022-01-23 RX ADMIN — SODIUM CHLORIDE 75 MILLILITER(S): 9 INJECTION, SOLUTION INTRAVENOUS at 15:58

## 2022-01-23 RX ADMIN — SODIUM CHLORIDE 3 MILLILITER(S): 9 INJECTION INTRAMUSCULAR; INTRAVENOUS; SUBCUTANEOUS at 13:20

## 2022-01-23 RX ADMIN — HYDROMORPHONE HYDROCHLORIDE 0.5 MILLIGRAM(S): 2 INJECTION INTRAMUSCULAR; INTRAVENOUS; SUBCUTANEOUS at 16:15

## 2022-01-23 RX ADMIN — SODIUM CHLORIDE 3 MILLILITER(S): 9 INJECTION INTRAMUSCULAR; INTRAVENOUS; SUBCUTANEOUS at 05:23

## 2022-01-23 RX ADMIN — Medication 400 MILLIGRAM(S): at 17:50

## 2022-01-23 RX ADMIN — HYDROMORPHONE HYDROCHLORIDE 0.25 MILLIGRAM(S): 2 INJECTION INTRAMUSCULAR; INTRAVENOUS; SUBCUTANEOUS at 00:15

## 2022-01-23 RX ADMIN — SODIUM CHLORIDE 75 MILLILITER(S): 9 INJECTION, SOLUTION INTRAVENOUS at 08:56

## 2022-01-23 RX ADMIN — HEPARIN SODIUM 5000 UNIT(S): 5000 INJECTION INTRAVENOUS; SUBCUTANEOUS at 22:56

## 2022-01-23 RX ADMIN — HYDROMORPHONE HYDROCHLORIDE 0.25 MILLIGRAM(S): 2 INJECTION INTRAMUSCULAR; INTRAVENOUS; SUBCUTANEOUS at 05:45

## 2022-01-23 RX ADMIN — POTASSIUM PHOSPHATE, MONOBASIC POTASSIUM PHOSPHATE, DIBASIC 83.33 MILLIMOLE(S): 236; 224 INJECTION, SOLUTION INTRAVENOUS at 05:52

## 2022-01-23 RX ADMIN — Medication 300 MILLIGRAM(S): at 05:38

## 2022-01-23 NOTE — PROGRESS NOTE ADULT - ATTENDING COMMENTS
Patient s/p component separation for ventral hernia, acute blood loss anemia transfused and now improved. Patient mental status improved.   Plan  Patient pain controlled will obtain PCA evaluation  npo, ngt, no bowel function  home medications  ivf  floor eligible    I have personally interviewed when possible and examined the patient, reviewed data and laboratory tests/x-rays and all pertinent electronic images.  I was physically present for the key portions of the evaluation and management (E/M) service provided.   The SICU team has a constant risk benefit analyzes discussion with the primary team, all consultants, House Staff and PA's on all decisions.  These diagnoses are unrelated to the surgical procedure noted above.  I meet with family if needed to get further history, discuss the case and make care decisions for this patient who might not be able to participate.  Time involved in performance of separately billable procedures was not counted toward my critical care time. There is no overlap.  I spent 30 minutes ( 0800Hrs-0915Hrs in AM/ 1600Hrs-1715Hrs in PM, or other time indicated) of critical care time for the diagnoses, assessment, plan and interventions.  This time excludes time spent on separate procedures and teaching.
Patient with history of whipple and incisional hernia repaired yesterday with component separation. Patient kept dry, with lactate of 8 given 3.5L crystalloid resuscitation and now downtrending to 6. Patient pain control is an issue called for PCA.  Plan  pain control with pca  another 500cc of albumin and repeat labs  npo, ngt, ivf  vte ppx  home medications  possible floor eligible pending repeat lactate    I have personally interviewed when possible and examined the patient, reviewed data and laboratory tests/x-rays and all pertinent electronic images.  I was physically present for the key portions of the evaluation and management (E/M) service provided.   The SICU team has a constant risk benefit analyzes discussion with the primary team, all consultants, House Staff and PA's on all decisions.  These diagnoses are unrelated to the surgical procedure noted above.  I meet with family if needed to get further history, discuss the case and make care decisions for this patient who might not be able to participate.  Time involved in performance of separately billable procedures was not counted toward my critical care time. There is no overlap.  I spent 30 minutes ( 0800Hrs-0915Hrs in AM/ 1600Hrs-1715Hrs in PM, or other time indicated) of critical care time for the diagnoses, assessment, plan and interventions.  This time excludes time spent on separate procedures and teaching.
seen and agree  Cont SICU  trend labs and lactate
The patient is a 57y Female who is now POD#2 s/p ventral hernia repair with mesh, yesterday pt with drop in Hb therefore pt received 1u PRBC.    Plan:  - Hb responded well to 1u, pt currently stable  - C/w Swanson, monitor UOP  - Restart DVT ppx    Monitor H/H  seen w team  appreciate SICU care  lactate improving

## 2022-01-23 NOTE — PROGRESS NOTE ADULT - ASSESSMENT
The patient is a 57y Female who is now POD#1 s/p ventral hernia repair with mesh     Plan:  - SICU called to evaluate   - Pain control as needed  - OOB and ambulating as tolerated  - F/u AM labs.    D Team Surgery   [id number] The patient is a 57y Female who is now POD#2 s/p ventral hernia repair with mesh, yesterday pt with drop in Hb therefore pt received 1u PRBC.    Plan:  - Hb responded well to 1u, pt currently stable  - C/w Kiki, monitor UOP  - Per SICU, pt to come to floor today. Continue to monitor    D Team Surgery   [id number] The patient is a 57y Female who is now POD#2 s/p ventral hernia repair with mesh, yesterday pt with drop in Hb therefore pt received 1u PRBC.    Plan:  - Hb responded well to 1u, pt currently stable  - C/w Swanson, monitor UOP  - Restart DVT ppx    D Team Surgery   [id number]

## 2022-01-23 NOTE — PROGRESS NOTE ADULT - SUBJECTIVE AND OBJECTIVE BOX
Plastic Surgery  Daily Progress Note     Subjective/24 Hour Events:  Patient seen and examined on morning rounds.   Got transfused 1u PRBC yesterday with adequate response   Doing generally okay.    Objective:    Vitals:  T(C): 37.4 (01-23-22 @ 06:00), Max: 37.9 (01-23-22 @ 00:00)  HR: 72 (01-23-22 @ 07:00) (72 - 95)  BP: 119/60 (01-23-22 @ 07:00) (93/50 - 132/60)  RR: 12 (01-23-22 @ 07:00) (11 - 23)  SpO2: 97% (01-23-22 @ 07:00) (93% - 100%)    I/O:     01-22-22 @ 07:01  -  01-23-22 @ 07:00  --------------------------------------------------------  IN: 3300 mL / OUT: 2815 mL / NET: 485 mL        Physical Exam:   -- CONSTITUTIONAL: appears in mild discomfort  -- NEURO: Awake, alert  -- PULM: Non-labored respirations  -- ABDOMEN: soft, mildly tender, mildly distended. Dressing c/d/i, no collections appreciated. Drains serosanguinous.       Allergies:  No Known Allergies      Meds:   acetaminophen   IVPB .. 750 milliGRAM(s) IV Intermittent every 6 hours  dextrose 50% Injectable 12.5 Gram(s) IV Push once  dextrose 50% Injectable 25 Gram(s) IV Push once  HYDROmorphone  Injectable 0.25 milliGRAM(s) IV Push every 4 hours PRN  HYDROmorphone  Injectable 0.5 milliGRAM(s) IV Push every 4 hours PRN  influenza   Vaccine 0.5 milliLiter(s) IntraMuscular once  insulin glargine Injectable (LANTUS) 10 Unit(s) SubCutaneous at bedtime  insulin lispro (ADMELOG) corrective regimen sliding scale   SubCutaneous every 6 hours  lactated ringers. 1000 milliLiter(s) IV Continuous <Continuous>  melatonin 3 milliGRAM(s) Oral once  naloxone Injectable 0.1 milliGRAM(s) IV Push every 3 minutes PRN  ondansetron Injectable 4 milliGRAM(s) IV Push every 6 hours PRN  pantoprazole  Injectable 40 milliGRAM(s) IV Push daily  sodium chloride 0.9% lock flush 3 milliLiter(s) IV Push every 8 hours      Labs:                         7.9    8.92  )-----------( 81       ( 23 Jan 2022 02:22 )             24.5     01-23    134<L>  |  100  |  11  ----------------------------<  135<H>  4.2   |  27  |  0.66    Ca    8.5      23 Jan 2022 02:22  Phos  1.6     01-23  Mg     2.00     01-23    TPro  6.6  /  Alb  3.9  /  TBili  1.0  /  DBili  x   /  AST  166<H>  /  ALT  103<H>  /  AlkPhos  85  01-22

## 2022-01-23 NOTE — PROGRESS NOTE ADULT - SUBJECTIVE AND OBJECTIVE BOX
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  Pt with c/o abdominal hernia which has increased in size and is causing discomfort. Pt now s/p Exploratory Laparotomy Lysis of Adhesions, Repair of Ventral Hernia, Component Separation.   Intraoperatively upon initial attempt at closure, there were increased peak pressures on the ventilator; subsequently the rectus sheath was released with improvement in peak pressures. Patient subsequently in PACU for approximately 6 hours with persistent moderate pain, and decreased urine output at approx 16:00 which improved w fluid bolus and albumin.     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 750 milliGRAM(s) IV Intermittent every 6 hours  HYDROmorphone  Injectable 0.25 milliGRAM(s) IV Push every 4 hours PRN Moderate Pain (4 - 6)  HYDROmorphone  Injectable 0.5 milliGRAM(s) IV Push every 4 hours PRN Severe Pain (7 - 10)  melatonin 3 milliGRAM(s) Oral once  ondansetron Injectable 4 milliGRAM(s) IV Push every 6 hours PRN Nausea    Respiratory Medications    Cardiovascular Medications    Gastrointestinal Medications  lactated ringers. 1000 milliLiter(s) IV Continuous <Continuous>  pantoprazole  Injectable 40 milliGRAM(s) IV Push daily  sodium chloride 0.9% lock flush 3 milliLiter(s) IV Push every 8 hours    Genitourinary Medications    Hematologic/Oncologic Medications  influenza   Vaccine 0.5 milliLiter(s) IntraMuscular once    Antimicrobial/Immunologic Medications    Endocrine/Metabolic Medications  dextrose 50% Injectable 12.5 Gram(s) IV Push once  dextrose 50% Injectable 25 Gram(s) IV Push once  insulin glargine Injectable (LANTUS) 10 Unit(s) SubCutaneous at bedtime  insulin lispro (ADMELOG) corrective regimen sliding scale   SubCutaneous every 6 hours    Topical/Other Medications  naloxone Injectable 0.1 milliGRAM(s) IV Push every 3 minutes PRN For ANY of the following changes in patient status:  A. RR LESS THAN 10 breaths per minute, B. Oxygen saturation LESS THAN 90%, C. Sedation score of 6    --------------------------------------------------------------------------------------    VITAL SIGNS, INS/OUTS (last 24 hours):  --------------------------------------------------------------------------------------  ICU Vital Signs Last 24 Hrs  T(C): 37.9 (23 Jan 2022 00:00), Max: 37.9 (23 Jan 2022 00:00)  T(F): 100.2 (23 Jan 2022 00:00), Max: 100.2 (23 Jan 2022 00:00)  HR: 86 (23 Jan 2022 02:00) (77 - 89)  BP: 122/58 (23 Jan 2022 02:00) (93/50 - 122/61)  BP(mean): 73 (23 Jan 2022 02:00) (60 - 76)  ABP: --  ABP(mean): --  RR: 19 (23 Jan 2022 02:00) (11 - 23)  SpO2: 95% (23 Jan 2022 02:00) (95% - 100%)    --------------------------------------------------------------------------------------    EXAM:  General/Neuro  Exam: Normal, AAOx3, in mild-moderate discomfort with intermittent groaning     Respiratory  Exam: Lungs clear to auscultation, Normal expansion/effort. Mildly tachypneic to mid 20s.     Cardiovascular  Exam: S1, S2.  Sinus tachycardia. No significant peripheral edema appreciated   Cardiac Rhythm: sinus tachycardia   ECHO:  2019 TTE demonstrates EF 74% w no significant valvular dz     GI  Exam: Abdomen soft in all 4 quadrants, slightly more convex on L side vs R side, dressing in place, drains w serosanguinous fluid.  MIldly TTP.     Tubes/Lines/Drains  ***  [x] Peripheral IV  [] Central Venous Line     	[] R	[] L	[] IJ	[] Fem	[] SC        Type:	    Date Placed:   [] Arterial Line		[] R	[] L	[] Fem	[] Rad	[] Ax	Date Placed:   [] PICC:         	[] Midline		[] Mediport           [] Urinary Catheter		Date Placed:     Extremities  Exam: Extremities warm, pink, well-perfused.      Derm:  Exam: Good skin turgor, no skin breakdown.      :   Exam: Swanson catheter in place.     LABS  --------------------------------------------------------------------------------------  LABS:  cret                        7.9    8.92  )-----------( 81       ( 23 Jan 2022 02:22 )             24.5     01-23    134<L>  |  100  |  11  ----------------------------<  135<H>  4.2   |  27  |  0.66    Ca    8.5      23 Jan 2022 02:22  Phos  1.6     01-23  Mg     2.00     01-23    TPro  6.6  /  Alb  3.9  /  TBili  1.0  /  DBili  x   /  AST  166<H>  /  ALT  103<H>  /  AlkPhos  85  01-22    --------------------------------------------------------------------------------------    OTHER LABORATORY:     IMAGING STUDIES:   CXR:     ASSESSMENT:  57F with hx of ampullary cancer s/p Whipple procedure 1/11/19, repair of gastroduodenal artery bleed 1/18/19 and drainage of abscess/repair of abdominal wall on 1/21/19, incisional hernia s/p repair yesterday with initial concern for abdominal compartment syndrome postoperatively.     PLAN:   Neurologic:  - Currently AAOx3  - s/p duramorph spinal intraop w 0.15 mcg  - dilaudid PRN, IV tylenol standing  - Can consider PCA for pain control; Pain following    Respiratory:   - No active issues  - Saturating well on room air    Cardiovascular:   - Hx HLD on Atorvastatin 10 mg daily; no other cardiac hx  - tachycardia improved    Gastrointestinal/Nutrition:   - Incisional hernia s/p lysis of adhesions and ventral hernia repair with mesh on 1/21  - Non paralyzed bladder pressures 3  - NGT/NPO; LIWS    Renal/Genitourinary:   - Baseline creatinine 0.5; adequate UOP last 24 hours  - Trend creatinine and UOP    Hematologic:   - s/p 1u PRBC for Hgb 6.9  - Lovenox for DVT PPx    Infectious Disease:   - Afebrile and w/ mild leukocytosis; likely reactive  - Monitor off antibiotics    Endocrine:   - DM2 on Glimepiride 1 mg daily at home; A1c 7.0 (8.4 in August)  - Lantus 8 units this AM; start 10 units nightly with correctional scale while NPO  - Monitor glucose levels and adjust accordingly    Lines/Tubes:  PIV, NGT    Disposition:   SICU    --------------------------------------------------------------------------------------    Critical Care Diagnoses:

## 2022-01-23 NOTE — PHYSICAL THERAPY INITIAL EVALUATION ADULT - PERTINENT HX OF CURRENT PROBLEM, REHAB EVAL
patient is a 57 year old female POD#2 s/p ventral hernia repair with mesh, yesterday pt with drop in Hb therefore pt received 1u PRBC.

## 2022-01-23 NOTE — PROGRESS NOTE ADULT - SUBJECTIVE AND OBJECTIVE BOX
Surgery Progress Note     Subjective/24hour Events: Patient seen and examined at the bedside this morning. No acute events overnight. Pain controlled.     Vital Signs:  Vital Signs Last 24 Hrs  T(C): 37.2 (23 Jan 2022 04:00), Max: 37.9 (23 Jan 2022 00:00)  T(F): 98.9 (23 Jan 2022 04:00), Max: 100.2 (23 Jan 2022 00:00)  HR: 95 (23 Jan 2022 05:00) (75 - 95)  BP: 97/65 (23 Jan 2022 05:00) (93/50 - 122/61)  BP(mean): 72 (23 Jan 2022 05:00) (60 - 76)  RR: 22 (23 Jan 2022 05:00) (11 - 23)  SpO2: 97% (23 Jan 2022 05:00) (95% - 100%)        I&O's Detail    21 Jan 2022 07:01  -  22 Jan 2022 07:00  --------------------------------------------------------  IN:    IV PiggyBack: 500 mL    Lactated Ringers: 750 mL    Lactated Ringers: 525 mL    Lactated Ringers Bolus: 2000 mL  Total IN: 3775 mL    OUT:    Bulb (mL): 190 mL    Bulb (mL): 240 mL    Indwelling Catheter - Urethral (mL): 1345 mL    Nasogastric/Oral tube (mL): 220 mL  Total OUT: 1995 mL    Total NET: 1780 mL      22 Jan 2022 07:01  -  23 Jan 2022 05:39  --------------------------------------------------------  IN:    IV PiggyBack: 1200 mL    Lactated Ringers: 1650 mL    PRBCs (Packed Red Blood Cells): 300 mL  Total IN: 3150 mL    OUT:    Bulb (mL): 100 mL    Bulb (mL): 115 mL    Indwelling Catheter - Urethral (mL): 2105 mL    Nasogastric/Oral tube (mL): 250 mL  Total OUT: 2570 mL    Total NET: 580 mL            Physical Exam:  General: NAD, resting comfortably in bed  Pulmonary: Nonlabored breathing, no respiratory distress  Cardiovascular: NSR  Abdominal: soft, NT/ND. Midline incision with two drains placed.   Extremities: WWP    Labs:    01-23    134<L>  |  100  |  11  ----------------------------<  135<H>  4.2   |  27  |  0.66    Ca    8.5      23 Jan 2022 02:22  Phos  1.6     01-23  Mg     2.00     01-23    TPro  6.6  /  Alb  3.9  /  TBili  1.0  /  DBili  x   /  AST  166<H>  /  ALT  103<H>  /  AlkPhos  85  01-22    CAPILLARY BLOOD GLUCOSE      POCT Blood Glucose.: 123 mg/dL (22 Jan 2022 23:39)  POCT Blood Glucose.: 151 mg/dL (22 Jan 2022 17:04)  POCT Blood Glucose.: 180 mg/dL (22 Jan 2022 12:39)  POCT Blood Glucose.: 194 mg/dL (22 Jan 2022 08:41)  POCT Blood Glucose.: 230 mg/dL (22 Jan 2022 06:24)    LIVER FUNCTIONS - ( 22 Jan 2022 01:42 )  Alb: 3.9 g/dL / Pro: 6.6 g/dL / ALK PHOS: 85 U/L / ALT: 103 U/L / AST: 166 U/L / GGT: x                                 7.9    8.92  )-----------( 81       ( 23 Jan 2022 02:22 )             24.5            Surgery Progress Note   24h Events:  pt s/p 1u pRBC yesterday for hemoglobin drop, with good response.     Subjective Patient seen and examined at the bedside this morning. No acute events overnight. Pain controlled.     Vital Signs:  Vital Signs Last 24 Hrs  T(C): 37.2 (23 Jan 2022 04:00), Max: 37.9 (23 Jan 2022 00:00)  T(F): 98.9 (23 Jan 2022 04:00), Max: 100.2 (23 Jan 2022 00:00)  HR: 95 (23 Jan 2022 05:00) (75 - 95)  BP: 97/65 (23 Jan 2022 05:00) (93/50 - 122/61)  BP(mean): 72 (23 Jan 2022 05:00) (60 - 76)  RR: 22 (23 Jan 2022 05:00) (11 - 23)  SpO2: 97% (23 Jan 2022 05:00) (95% - 100%)        I&O's Detail    21 Jan 2022 07:01  -  22 Jan 2022 07:00  --------------------------------------------------------  IN:    IV PiggyBack: 500 mL    Lactated Ringers: 750 mL    Lactated Ringers: 525 mL    Lactated Ringers Bolus: 2000 mL  Total IN: 3775 mL    OUT:    Bulb (mL): 190 mL    Bulb (mL): 240 mL    Indwelling Catheter - Urethral (mL): 1345 mL    Nasogastric/Oral tube (mL): 220 mL  Total OUT: 1995 mL    Total NET: 1780 mL      22 Jan 2022 07:01  -  23 Jan 2022 05:39  --------------------------------------------------------  IN:    IV PiggyBack: 1200 mL    Lactated Ringers: 1650 mL    PRBCs (Packed Red Blood Cells): 300 mL  Total IN: 3150 mL    OUT:    Bulb (mL): 100 mL    Bulb (mL): 115 mL    Indwelling Catheter - Urethral (mL): 2105 mL    Nasogastric/Oral tube (mL): 250 mL  Total OUT: 2570 mL    Total NET: 580 mL            Physical Exam:  General: NAD, resting comfortably in bed  Pulmonary: Nonlabored breathing, no respiratory distress  Cardiovascular: NSR  Abdominal: soft, NT/ND. Midline incision with two drains placed.   Extremities: WWP    Labs:    01-23    134<L>  |  100  |  11  ----------------------------<  135<H>  4.2   |  27  |  0.66    Ca    8.5      23 Jan 2022 02:22  Phos  1.6     01-23  Mg     2.00     01-23    TPro  6.6  /  Alb  3.9  /  TBili  1.0  /  DBili  x   /  AST  166<H>  /  ALT  103<H>  /  AlkPhos  85  01-22    CAPILLARY BLOOD GLUCOSE      POCT Blood Glucose.: 123 mg/dL (22 Jan 2022 23:39)  POCT Blood Glucose.: 151 mg/dL (22 Jan 2022 17:04)  POCT Blood Glucose.: 180 mg/dL (22 Jan 2022 12:39)  POCT Blood Glucose.: 194 mg/dL (22 Jan 2022 08:41)  POCT Blood Glucose.: 230 mg/dL (22 Jan 2022 06:24)    LIVER FUNCTIONS - ( 22 Jan 2022 01:42 )  Alb: 3.9 g/dL / Pro: 6.6 g/dL / ALK PHOS: 85 U/L / ALT: 103 U/L / AST: 166 U/L / GGT: x                                 7.9    8.92  )-----------( 81       ( 23 Jan 2022 02:22 )             24.5            Surgery Progress Note   24h Events:  pt s/p 1u pRBC yesterday for hemoglobin drop, with good response.     Subjective Patient seen and examined at the bedside this morning. No acute events overnight. Pt reporting some pain this morning.    Vital Signs:  Vital Signs Last 24 Hrs  T(C): 37.2 (23 Jan 2022 04:00), Max: 37.9 (23 Jan 2022 00:00)  T(F): 98.9 (23 Jan 2022 04:00), Max: 100.2 (23 Jan 2022 00:00)  HR: 95 (23 Jan 2022 05:00) (75 - 95)  BP: 97/65 (23 Jan 2022 05:00) (93/50 - 122/61)  BP(mean): 72 (23 Jan 2022 05:00) (60 - 76)  RR: 22 (23 Jan 2022 05:00) (11 - 23)  SpO2: 97% (23 Jan 2022 05:00) (95% - 100%)        I&O's Detail    21 Jan 2022 07:01  -  22 Jan 2022 07:00  --------------------------------------------------------  IN:    IV PiggyBack: 500 mL    Lactated Ringers: 750 mL    Lactated Ringers: 525 mL    Lactated Ringers Bolus: 2000 mL  Total IN: 3775 mL    OUT:    Bulb (mL): 190 mL    Bulb (mL): 240 mL    Indwelling Catheter - Urethral (mL): 1345 mL    Nasogastric/Oral tube (mL): 220 mL  Total OUT: 1995 mL    Total NET: 1780 mL      22 Jan 2022 07:01  -  23 Jan 2022 05:39  --------------------------------------------------------  IN:    IV PiggyBack: 1200 mL    Lactated Ringers: 1650 mL    PRBCs (Packed Red Blood Cells): 300 mL  Total IN: 3150 mL    OUT:    Bulb (mL): 100 mL    Bulb (mL): 115 mL    Indwelling Catheter - Urethral (mL): 2105 mL    Nasogastric/Oral tube (mL): 250 mL  Total OUT: 2570 mL    Total NET: 580 mL            Physical Exam:  General: NAD, resting comfortably in bed  Pulmonary: Nonlabored breathing, no respiratory distress  Cardiovascular: NSR  Abdominal: soft, NT/ND. Midline incision with two drains placed.   Extremities: WWP    Labs:    01-23    134<L>  |  100  |  11  ----------------------------<  135<H>  4.2   |  27  |  0.66    Ca    8.5      23 Jan 2022 02:22  Phos  1.6     01-23  Mg     2.00     01-23    TPro  6.6  /  Alb  3.9  /  TBili  1.0  /  DBili  x   /  AST  166<H>  /  ALT  103<H>  /  AlkPhos  85  01-22    CAPILLARY BLOOD GLUCOSE      POCT Blood Glucose.: 123 mg/dL (22 Jan 2022 23:39)  POCT Blood Glucose.: 151 mg/dL (22 Jan 2022 17:04)  POCT Blood Glucose.: 180 mg/dL (22 Jan 2022 12:39)  POCT Blood Glucose.: 194 mg/dL (22 Jan 2022 08:41)  POCT Blood Glucose.: 230 mg/dL (22 Jan 2022 06:24)    LIVER FUNCTIONS - ( 22 Jan 2022 01:42 )  Alb: 3.9 g/dL / Pro: 6.6 g/dL / ALK PHOS: 85 U/L / ALT: 103 U/L / AST: 166 U/L / GGT: x                                 7.9    8.92  )-----------( 81       ( 23 Jan 2022 02:22 )             24.5            TRANSFER NOTE  Pt transferred from SICU to floor this AM.     24h Events:  pt s/p 1u pRBC yesterday for hemoglobin drop, with good response.     Subjective Patient seen and examined at the bedside this morning. No acute events overnight. Pt reporting some pain this morning.    Vital Signs:  Vital Signs Last 24 Hrs  T(C): 37.2 (23 Jan 2022 04:00), Max: 37.9 (23 Jan 2022 00:00)  T(F): 98.9 (23 Jan 2022 04:00), Max: 100.2 (23 Jan 2022 00:00)  HR: 95 (23 Jan 2022 05:00) (75 - 95)  BP: 97/65 (23 Jan 2022 05:00) (93/50 - 122/61)  BP(mean): 72 (23 Jan 2022 05:00) (60 - 76)  RR: 22 (23 Jan 2022 05:00) (11 - 23)  SpO2: 97% (23 Jan 2022 05:00) (95% - 100%)        I&O's Detail    21 Jan 2022 07:01  -  22 Jan 2022 07:00  --------------------------------------------------------  IN:    IV PiggyBack: 500 mL    Lactated Ringers: 750 mL    Lactated Ringers: 525 mL    Lactated Ringers Bolus: 2000 mL  Total IN: 3775 mL    OUT:    Bulb (mL): 190 mL    Bulb (mL): 240 mL    Indwelling Catheter - Urethral (mL): 1345 mL    Nasogastric/Oral tube (mL): 220 mL  Total OUT: 1995 mL    Total NET: 1780 mL      22 Jan 2022 07:01  -  23 Jan 2022 05:39  --------------------------------------------------------  IN:    IV PiggyBack: 1200 mL    Lactated Ringers: 1650 mL    PRBCs (Packed Red Blood Cells): 300 mL  Total IN: 3150 mL    OUT:    Bulb (mL): 100 mL    Bulb (mL): 115 mL    Indwelling Catheter - Urethral (mL): 2105 mL    Nasogastric/Oral tube (mL): 250 mL  Total OUT: 2570 mL    Total NET: 580 mL            Physical Exam:  General: NAD, resting comfortably in bed  Pulmonary: Nonlabored breathing, no respiratory distress  Cardiovascular: NSR  Abdominal: soft, NT/ND. Midline incision with two drains placed.   Extremities: WWP    Labs:    01-23    134<L>  |  100  |  11  ----------------------------<  135<H>  4.2   |  27  |  0.66    Ca    8.5      23 Jan 2022 02:22  Phos  1.6     01-23  Mg     2.00     01-23    TPro  6.6  /  Alb  3.9  /  TBili  1.0  /  DBili  x   /  AST  166<H>  /  ALT  103<H>  /  AlkPhos  85  01-22    CAPILLARY BLOOD GLUCOSE      POCT Blood Glucose.: 123 mg/dL (22 Jan 2022 23:39)  POCT Blood Glucose.: 151 mg/dL (22 Jan 2022 17:04)  POCT Blood Glucose.: 180 mg/dL (22 Jan 2022 12:39)  POCT Blood Glucose.: 194 mg/dL (22 Jan 2022 08:41)  POCT Blood Glucose.: 230 mg/dL (22 Jan 2022 06:24)    LIVER FUNCTIONS - ( 22 Jan 2022 01:42 )  Alb: 3.9 g/dL / Pro: 6.6 g/dL / ALK PHOS: 85 U/L / ALT: 103 U/L / AST: 166 U/L / GGT: x                                 7.9    8.92  )-----------( 81       ( 23 Jan 2022 02:22 )             24.5

## 2022-01-23 NOTE — PROGRESS NOTE ADULT - ASSESSMENT
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 Admitted to SICU for concern of abdominal compartment syndrome. Serial exams stable, bladder pressure 3 mm Hg.  - continue abdominal binder  - continue JOURDAN drains  - serial exams per surgery/sicu  - okay for chemo VTE ppx from PRS standpoint (currently not ordered)  - will continue to follow      Plastic Surgery (pg [facility]: [id number], [facility]: [phone])

## 2022-01-24 LAB
ANION GAP SERPL CALC-SCNC: 13 MMOL/L — SIGNIFICANT CHANGE UP (ref 7–14)
BUN SERPL-MCNC: 10 MG/DL — SIGNIFICANT CHANGE UP (ref 7–23)
CALCIUM SERPL-MCNC: 8.5 MG/DL — SIGNIFICANT CHANGE UP (ref 8.4–10.5)
CHLORIDE SERPL-SCNC: 101 MMOL/L — SIGNIFICANT CHANGE UP (ref 98–107)
CO2 SERPL-SCNC: 24 MMOL/L — SIGNIFICANT CHANGE UP (ref 22–31)
CREAT SERPL-MCNC: 0.57 MG/DL — SIGNIFICANT CHANGE UP (ref 0.5–1.3)
GLUCOSE BLDC GLUCOMTR-MCNC: 102 MG/DL — HIGH (ref 70–99)
GLUCOSE BLDC GLUCOMTR-MCNC: 129 MG/DL — HIGH (ref 70–99)
GLUCOSE BLDC GLUCOMTR-MCNC: 151 MG/DL — HIGH (ref 70–99)
GLUCOSE BLDC GLUCOMTR-MCNC: 176 MG/DL — HIGH (ref 70–99)
GLUCOSE BLDC GLUCOMTR-MCNC: 86 MG/DL — SIGNIFICANT CHANGE UP (ref 70–99)
GLUCOSE SERPL-MCNC: 80 MG/DL — SIGNIFICANT CHANGE UP (ref 70–99)
HCT VFR BLD CALC: 23.1 % — LOW (ref 34.5–45)
HGB BLD-MCNC: 7.8 G/DL — LOW (ref 11.5–15.5)
MAGNESIUM SERPL-MCNC: 1.7 MG/DL — SIGNIFICANT CHANGE UP (ref 1.6–2.6)
MCHC RBC-ENTMCNC: 25.1 PG — LOW (ref 27–34)
MCHC RBC-ENTMCNC: 33.8 GM/DL — SIGNIFICANT CHANGE UP (ref 32–36)
MCV RBC AUTO: 74.3 FL — LOW (ref 80–100)
NRBC # BLD: 0 /100 WBCS — SIGNIFICANT CHANGE UP
NRBC # FLD: 0 K/UL — SIGNIFICANT CHANGE UP
PHOSPHATE SERPL-MCNC: 2.9 MG/DL — SIGNIFICANT CHANGE UP (ref 2.5–4.5)
PLATELET # BLD AUTO: 103 K/UL — LOW (ref 150–400)
POTASSIUM SERPL-MCNC: 4.1 MMOL/L — SIGNIFICANT CHANGE UP (ref 3.5–5.3)
POTASSIUM SERPL-SCNC: 4.1 MMOL/L — SIGNIFICANT CHANGE UP (ref 3.5–5.3)
RBC # BLD: 3.11 M/UL — LOW (ref 3.8–5.2)
RBC # FLD: 15.9 % — HIGH (ref 10.3–14.5)
SODIUM SERPL-SCNC: 138 MMOL/L — SIGNIFICANT CHANGE UP (ref 135–145)
WBC # BLD: 10.86 K/UL — HIGH (ref 3.8–10.5)
WBC # FLD AUTO: 10.86 K/UL — HIGH (ref 3.8–10.5)

## 2022-01-24 RX ORDER — SODIUM CHLORIDE 9 MG/ML
1000 INJECTION, SOLUTION INTRAVENOUS
Refills: 0 | Status: DISCONTINUED | OUTPATIENT
Start: 2022-01-24 | End: 2022-01-27

## 2022-01-24 RX ORDER — INSULIN LISPRO 100/ML
VIAL (ML) SUBCUTANEOUS AT BEDTIME
Refills: 0 | Status: DISCONTINUED | OUTPATIENT
Start: 2022-01-24 | End: 2022-01-31

## 2022-01-24 RX ORDER — INSULIN LISPRO 100/ML
VIAL (ML) SUBCUTANEOUS
Refills: 0 | Status: DISCONTINUED | OUTPATIENT
Start: 2022-01-24 | End: 2022-01-31

## 2022-01-24 RX ORDER — DEXTROSE 50 % IN WATER 50 %
25 SYRINGE (ML) INTRAVENOUS ONCE
Refills: 0 | Status: DISCONTINUED | OUTPATIENT
Start: 2022-01-24 | End: 2022-01-27

## 2022-01-24 RX ORDER — HYDROMORPHONE HYDROCHLORIDE 2 MG/ML
0.25 INJECTION INTRAMUSCULAR; INTRAVENOUS; SUBCUTANEOUS ONCE
Refills: 0 | Status: DISCONTINUED | OUTPATIENT
Start: 2022-01-24 | End: 2022-01-24

## 2022-01-24 RX ORDER — DEXTROSE 50 % IN WATER 50 %
12.5 SYRINGE (ML) INTRAVENOUS ONCE
Refills: 0 | Status: DISCONTINUED | OUTPATIENT
Start: 2022-01-24 | End: 2022-01-27

## 2022-01-24 RX ORDER — GLUCAGON INJECTION, SOLUTION 0.5 MG/.1ML
1 INJECTION, SOLUTION SUBCUTANEOUS ONCE
Refills: 0 | Status: DISCONTINUED | OUTPATIENT
Start: 2022-01-24 | End: 2022-01-27

## 2022-01-24 RX ORDER — MAGNESIUM SULFATE 500 MG/ML
2 VIAL (ML) INJECTION ONCE
Refills: 0 | Status: DISCONTINUED | OUTPATIENT
Start: 2022-01-24 | End: 2022-01-24

## 2022-01-24 RX ORDER — DEXTROSE MONOHYDRATE, SODIUM CHLORIDE, AND POTASSIUM CHLORIDE 50; .745; 4.5 G/1000ML; G/1000ML; G/1000ML
1000 INJECTION, SOLUTION INTRAVENOUS
Refills: 0 | Status: DISCONTINUED | OUTPATIENT
Start: 2022-01-24 | End: 2022-01-27

## 2022-01-24 RX ORDER — MAGNESIUM SULFATE 500 MG/ML
2 VIAL (ML) INJECTION ONCE
Refills: 0 | Status: COMPLETED | OUTPATIENT
Start: 2022-01-24 | End: 2022-01-24

## 2022-01-24 RX ORDER — DEXTROSE 50 % IN WATER 50 %
15 SYRINGE (ML) INTRAVENOUS ONCE
Refills: 0 | Status: DISCONTINUED | OUTPATIENT
Start: 2022-01-24 | End: 2022-01-27

## 2022-01-24 RX ADMIN — HEPARIN SODIUM 5000 UNIT(S): 5000 INJECTION INTRAVENOUS; SUBCUTANEOUS at 13:58

## 2022-01-24 RX ADMIN — SODIUM CHLORIDE 3 MILLILITER(S): 9 INJECTION INTRAMUSCULAR; INTRAVENOUS; SUBCUTANEOUS at 05:51

## 2022-01-24 RX ADMIN — Medication 3 MILLIGRAM(S): at 22:15

## 2022-01-24 RX ADMIN — Medication 1: at 17:20

## 2022-01-24 RX ADMIN — HYDROMORPHONE HYDROCHLORIDE 0.5 MILLIGRAM(S): 2 INJECTION INTRAMUSCULAR; INTRAVENOUS; SUBCUTANEOUS at 07:49

## 2022-01-24 RX ADMIN — HEPARIN SODIUM 5000 UNIT(S): 5000 INJECTION INTRAVENOUS; SUBCUTANEOUS at 22:15

## 2022-01-24 RX ADMIN — Medication 400 MILLIGRAM(S): at 00:11

## 2022-01-24 RX ADMIN — HYDROMORPHONE HYDROCHLORIDE 0.5 MILLIGRAM(S): 2 INJECTION INTRAMUSCULAR; INTRAVENOUS; SUBCUTANEOUS at 02:37

## 2022-01-24 RX ADMIN — HYDROMORPHONE HYDROCHLORIDE 0.5 MILLIGRAM(S): 2 INJECTION INTRAMUSCULAR; INTRAVENOUS; SUBCUTANEOUS at 16:47

## 2022-01-24 RX ADMIN — HYDROMORPHONE HYDROCHLORIDE 0.5 MILLIGRAM(S): 2 INJECTION INTRAMUSCULAR; INTRAVENOUS; SUBCUTANEOUS at 03:07

## 2022-01-24 RX ADMIN — HYDROMORPHONE HYDROCHLORIDE 0.5 MILLIGRAM(S): 2 INJECTION INTRAMUSCULAR; INTRAVENOUS; SUBCUTANEOUS at 07:19

## 2022-01-24 RX ADMIN — Medication 25 GRAM(S): at 09:14

## 2022-01-24 RX ADMIN — INSULIN GLARGINE 10 UNIT(S): 100 INJECTION, SOLUTION SUBCUTANEOUS at 22:15

## 2022-01-24 RX ADMIN — HYDROMORPHONE HYDROCHLORIDE 0.25 MILLIGRAM(S): 2 INJECTION INTRAMUSCULAR; INTRAVENOUS; SUBCUTANEOUS at 10:05

## 2022-01-24 RX ADMIN — HYDROMORPHONE HYDROCHLORIDE 0.5 MILLIGRAM(S): 2 INJECTION INTRAMUSCULAR; INTRAVENOUS; SUBCUTANEOUS at 21:56

## 2022-01-24 RX ADMIN — PANTOPRAZOLE SODIUM 40 MILLIGRAM(S): 20 TABLET, DELAYED RELEASE ORAL at 12:50

## 2022-01-24 RX ADMIN — HEPARIN SODIUM 5000 UNIT(S): 5000 INJECTION INTRAVENOUS; SUBCUTANEOUS at 05:36

## 2022-01-24 RX ADMIN — Medication 400 MILLIGRAM(S): at 12:11

## 2022-01-24 RX ADMIN — HYDROMORPHONE HYDROCHLORIDE 0.5 MILLIGRAM(S): 2 INJECTION INTRAMUSCULAR; INTRAVENOUS; SUBCUTANEOUS at 17:10

## 2022-01-24 RX ADMIN — SODIUM CHLORIDE 3 MILLILITER(S): 9 INJECTION INTRAMUSCULAR; INTRAVENOUS; SUBCUTANEOUS at 14:27

## 2022-01-24 RX ADMIN — DEXTROSE MONOHYDRATE, SODIUM CHLORIDE, AND POTASSIUM CHLORIDE 100 MILLILITER(S): 50; .745; 4.5 INJECTION, SOLUTION INTRAVENOUS at 08:24

## 2022-01-24 RX ADMIN — Medication 400 MILLIGRAM(S): at 05:36

## 2022-01-24 RX ADMIN — HYDROMORPHONE HYDROCHLORIDE 0.25 MILLIGRAM(S): 2 INJECTION INTRAMUSCULAR; INTRAVENOUS; SUBCUTANEOUS at 09:46

## 2022-01-24 NOTE — PROGRESS NOTE ADULT - ASSESSMENT
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 Admitted to SICU for concern of abdominal compartment syndrome. Patient now on surgical floor since 1/23.    - continue abdominal binder  - continue JOURDAN drains  -pain control prn  - will continue to follow      Plastic Surgery (pg [facility]: [id number], NS: [phone])

## 2022-01-24 NOTE — PROGRESS NOTE ADULT - SUBJECTIVE AND OBJECTIVE BOX
PLASTIC SURGERY PROGRESS NOTE      SUBJECTIVE/ROS:   No acute events overnight  Patient doing okay.    OBJECTIVE:    Vital Signs Last 24 Hrs  T(C): 36.7 (24 Jan 2022 10:15), Max: 36.9 (23 Jan 2022 13:50)  T(F): 98.1 (24 Jan 2022 10:15), Max: 98.5 (23 Jan 2022 13:50)  HR: 80 (24 Jan 2022 10:15) (73 - 91)  BP: 117/60 (24 Jan 2022 10:15) (114/57 - 140/65)  BP(mean): 73 (23 Jan 2022 13:00) (73 - 75)  RR: 16 (24 Jan 2022 10:15) (11 - 19)  SpO2: 100% (24 Jan 2022 10:15) (95% - 100%)    PHYSICAL EXAM:  -- CONSTITUTIONAL: appears in mild discomfort  -- NEURO: Awake, alert  -- PULM: Non-labored respirations  -- ABDOMEN: soft, mildly tender, mildly distended. Dressing c/d/i, no collections appreciated. Drains serosanguinous.      I&Os:  I&O's Detail    23 Jan 2022 07:01  -  24 Jan 2022 07:00  --------------------------------------------------------  IN:    Lactated Ringers: 450 mL  Total IN: 450 mL    OUT:    Bulb (mL): 115 mL    Bulb (mL): 137.5 mL    Indwelling Catheter - Urethral (mL): 2300 mL    Nasogastric/Oral tube (mL): 250 mL  Total OUT: 2802.5 mL    Total NET: -2352.5 mL      24 Jan 2022 07:01  -  24 Jan 2022 11:17  --------------------------------------------------------  IN:  Total IN: 0 mL    OUT:    Bulb (mL): 10 mL    Bulb (mL): 7.5 mL  Total OUT: 17.5 mL    Total NET: -17.5 mL          LABS:                        7.8    10.86 )-----------( 103      ( 24 Jan 2022 06:51 )             23.1     01-24    138  |  101  |  10  ----------------------------<  80  4.1   |  24  |  0.57    Ca    8.5      24 Jan 2022 06:51  Phos  2.9     01-24  Mg     1.70     01-24

## 2022-01-24 NOTE — PROGRESS NOTE ADULT - SUBJECTIVE AND OBJECTIVE BOX
Surgery Progress Note    INTERVAl/SUBJECTIVE: No acute event overnight.     Vital Signs Last 24 Hrs  T(C): 36.6 (23 Jan 2022 21:31), Max: 37.4 (23 Jan 2022 06:00)  T(F): 97.8 (23 Jan 2022 21:31), Max: 99.3 (23 Jan 2022 06:00)  HR: 80 (23 Jan 2022 21:31) (72 - 95)  BP: 128/55 (23 Jan 2022 21:31) (97/65 - 145/67)  BP(mean): 73 (23 Jan 2022 13:00) (61 - 87)  RR: 16 (23 Jan 2022 21:31) (11 - 30)  SpO2: 100% (23 Jan 2022 21:31) (93% - 100%)    Physical Exam:  General:  Neuro:    CV:   Abdomen:     LABS:                        7.9    8.92  )-----------( 81       ( 23 Jan 2022 02:22 )             24.5     01-23    134<L>  |  100  |  11  ----------------------------<  135<H>  4.2   |  27  |  0.66    Ca    8.5      23 Jan 2022 02:22  Phos  1.6     01-23  Mg     2.00     01-23            INs and OUTs:    01-22-22 @ 07:01 - 01-23-22 @ 07:00  --------------------------------------------------------  IN: 3300 mL / OUT: 2815 mL / NET: 485 mL    01-23-22 @ 07:01  -  01-24-22 @ 01:54  --------------------------------------------------------  IN: 450 mL / OUT: 2097.5 mL / NET: -1647.5 mL     Surgery Daily Progress Note  =====================================================  Interval / Overnight Events: No acute events overnight.      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 1000 milliGRAM(s) IV Intermittent every 6 hours  HYDROmorphone  Injectable 0.25 milliGRAM(s) IV Push every 4 hours PRN Moderate Pain (4 - 6)  HYDROmorphone  Injectable 0.5 milliGRAM(s) IV Push every 4 hours PRN Severe Pain (7 - 10)  melatonin 3 milliGRAM(s) Oral once  ondansetron Injectable 4 milliGRAM(s) IV Push every 6 hours PRN Nausea    Gastrointestinal Medications  dextrose 5% + sodium chloride 0.45% with potassium chloride 20 mEq/L 1000 milliLiter(s) IV Continuous <Continuous>  pantoprazole  Injectable 40 milliGRAM(s) IV Push daily  sodium chloride 0.9% lock flush 3 milliLiter(s) IV Push every 8 hours    Hematologic/Oncologic Medications  heparin   Injectable 5000 Unit(s) SubCutaneous every 8 hours  influenza   Vaccine 0.5 milliLiter(s) IntraMuscular once    Endocrine/Metabolic Medications  dextrose 50% Injectable 12.5 Gram(s) IV Push once  dextrose 50% Injectable 25 Gram(s) IV Push once  insulin glargine Injectable (LANTUS) 10 Unit(s) SubCutaneous at bedtime  insulin lispro (ADMELOG) corrective regimen sliding scale   SubCutaneous every 6 hours    Topical/Other Medications  naloxone Injectable 0.1 milliGRAM(s) IV Push every 3 minutes PRN For ANY of the following changes in patient status:  A. RR LESS THAN 10 breaths per minute, B. Oxygen saturation LESS THAN 90%, C. Sedation score of 6    --------------------------------------------------------------------------------------    VITAL SIGNS:  T(C): 36.7 (24 Jan 2022 05:51), Max: 37.1 (23 Jan 2022 08:00)  T(F): 98 (24 Jan 2022 05:51), Max: 98.7 (23 Jan 2022 08:00)  HR: 80 (24 Jan 2022 05:51) (73 - 91)  BP: 124/64 (24 Jan 2022 05:51) (105/53 - 145/67)  BP(mean): 73 (23 Jan 2022 13:00) (65 - 87)  RR: 16 (24 Jan 2022 05:51) (11 - 30)  SpO2: 100% (24 Jan 2022 05:51) (94% - 100%)    --------------------------------------------------------------------------------------    INS AND OUTS:    23 Jan 2022 07:01  -  24 Jan 2022 07:00  --------------------------------------------------------  IN:    Lactated Ringers: 450 mL  Total IN: 450 mL    OUT:    Bulb (mL): 115 mL    Bulb (mL): 137.5 mL    Indwelling Catheter - Urethral (mL): 2300 mL    Nasogastric/Oral tube (mL): 250 mL  Total OUT: 2802.5 mL    Total NET: -2352.5 mL    --------------------------------------------------------------------------------------    EXAM    NEUROLOGY  Exam: Normal, in no acute distress.    HEENT  Exam: Normocephalic, atraumatic.    RESPIRATORY  Exam: Normal expansion / effort.    CARDIOVASCULAR  Exam: S1, S2.  Regular rate and rhythm.    GI/NUTRITION  Exam: Abdomen softly distended.  Minimal tenderness to palpation.  Ag aquacel over midline incision.  JOURDAN drains x2 with serosanguinous output.  NGT.  Abdominal binder.  Current Diet: NPO    MUSCULOSKELETAL  Exam: All extremities moving spontaneously without limitations.      METABOLIC / FLUIDS / ELECTROLYTES  dextrose 5% + sodium chloride 0.45% with potassium chloride 20 mEq/L 1000 milliLiter(s) IV Continuous <Continuous>  sodium chloride 0.9% lock flush 3 milliLiter(s) IV Push every 8 hours      HEMATOLOGIC  [x] VTE Prophylaxis: heparin   Injectable 5000 Unit(s) SubCutaneous every 8 hours      INFECTIOUS DISEASE  Antimicrobials/Immunologic Medications:  influenza   Vaccine 0.5 milliLiter(s) IntraMuscular once    --------------------------------------------------------------------------------------

## 2022-01-24 NOTE — PROGRESS NOTE ADULT - ASSESSMENT
The patient is a 57y Female who is now POD#3 s/p ventral hernia repair with mesh, yesterday pt with drop in Hb therefore pt received 1u PRBC.    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  Patient is now S/P ventral hernia repair with mesh on 1/21/22.      PLAN:  - NPO  - NGT to continuous low wall suction  - Change to maintenance IV fluids  - 0.5:1 NGT output repletions  - Out of bed  - Pain control  - VTE prophylaxis with Heparin subcutaneous      #[id number]  D Team Surgery

## 2022-01-25 LAB
ALBUMIN SERPL ELPH-MCNC: 3.1 G/DL — LOW (ref 3.3–5)
ALP SERPL-CCNC: 79 U/L — SIGNIFICANT CHANGE UP (ref 40–120)
ALT FLD-CCNC: 97 U/L — HIGH (ref 4–33)
ANION GAP SERPL CALC-SCNC: 8 MMOL/L — SIGNIFICANT CHANGE UP (ref 7–14)
APTT BLD: 36.4 SEC — HIGH (ref 27–36.3)
AST SERPL-CCNC: 71 U/L — HIGH (ref 4–32)
BILIRUB SERPL-MCNC: 1 MG/DL — SIGNIFICANT CHANGE UP (ref 0.2–1.2)
BLD GP AB SCN SERPL QL: NEGATIVE — SIGNIFICANT CHANGE UP
BUN SERPL-MCNC: 11 MG/DL — SIGNIFICANT CHANGE UP (ref 7–23)
CALCIUM SERPL-MCNC: 7.9 MG/DL — LOW (ref 8.4–10.5)
CHLORIDE SERPL-SCNC: 98 MMOL/L — SIGNIFICANT CHANGE UP (ref 98–107)
CO2 SERPL-SCNC: 27 MMOL/L — SIGNIFICANT CHANGE UP (ref 22–31)
CREAT SERPL-MCNC: 0.56 MG/DL — SIGNIFICANT CHANGE UP (ref 0.5–1.3)
GLUCOSE BLDC GLUCOMTR-MCNC: 152 MG/DL — HIGH (ref 70–99)
GLUCOSE BLDC GLUCOMTR-MCNC: 167 MG/DL — HIGH (ref 70–99)
GLUCOSE BLDC GLUCOMTR-MCNC: 194 MG/DL — HIGH (ref 70–99)
GLUCOSE BLDC GLUCOMTR-MCNC: 204 MG/DL — HIGH (ref 70–99)
GLUCOSE SERPL-MCNC: 140 MG/DL — HIGH (ref 70–99)
HCT VFR BLD CALC: 19.3 % — CRITICAL LOW (ref 34.5–45)
HCT VFR BLD CALC: 22.7 % — LOW (ref 34.5–45)
HCT VFR BLD CALC: 23.8 % — LOW (ref 34.5–45)
HGB BLD-MCNC: 6.2 G/DL — CRITICAL LOW (ref 11.5–15.5)
HGB BLD-MCNC: 7.2 G/DL — LOW (ref 11.5–15.5)
HGB BLD-MCNC: 7.8 G/DL — LOW (ref 11.5–15.5)
INR BLD: 1.29 RATIO — HIGH (ref 0.88–1.16)
MAGNESIUM SERPL-MCNC: 2 MG/DL — SIGNIFICANT CHANGE UP (ref 1.6–2.6)
MCHC RBC-ENTMCNC: 24.3 PG — LOW (ref 27–34)
MCHC RBC-ENTMCNC: 24.9 PG — LOW (ref 27–34)
MCHC RBC-ENTMCNC: 25.1 PG — LOW (ref 27–34)
MCHC RBC-ENTMCNC: 31.7 GM/DL — LOW (ref 32–36)
MCHC RBC-ENTMCNC: 32.1 GM/DL — SIGNIFICANT CHANGE UP (ref 32–36)
MCHC RBC-ENTMCNC: 32.8 GM/DL — SIGNIFICANT CHANGE UP (ref 32–36)
MCV RBC AUTO: 75.7 FL — LOW (ref 80–100)
MCV RBC AUTO: 76.5 FL — LOW (ref 80–100)
MCV RBC AUTO: 78.5 FL — LOW (ref 80–100)
NRBC # BLD: 0 /100 WBCS — SIGNIFICANT CHANGE UP
NRBC # FLD: 0 K/UL — SIGNIFICANT CHANGE UP
NRBC # FLD: 0.02 K/UL — HIGH
NRBC # FLD: 0.04 K/UL — HIGH
PHOSPHATE SERPL-MCNC: 2.6 MG/DL — SIGNIFICANT CHANGE UP (ref 2.5–4.5)
PLATELET # BLD AUTO: 122 K/UL — LOW (ref 150–400)
PLATELET # BLD AUTO: 127 K/UL — LOW (ref 150–400)
PLATELET # BLD AUTO: 135 K/UL — LOW (ref 150–400)
POTASSIUM SERPL-MCNC: 4.4 MMOL/L — SIGNIFICANT CHANGE UP (ref 3.5–5.3)
POTASSIUM SERPL-SCNC: 4.4 MMOL/L — SIGNIFICANT CHANGE UP (ref 3.5–5.3)
PROT SERPL-MCNC: 5.8 G/DL — LOW (ref 6–8.3)
PROTHROM AB SERPL-ACNC: 14.5 SEC — HIGH (ref 10.6–13.6)
RBC # BLD: 2.55 M/UL — LOW (ref 3.8–5.2)
RBC # BLD: 2.89 M/UL — LOW (ref 3.8–5.2)
RBC # BLD: 3.11 M/UL — LOW (ref 3.8–5.2)
RBC # FLD: 15.5 % — HIGH (ref 10.3–14.5)
RBC # FLD: 15.7 % — HIGH (ref 10.3–14.5)
RBC # FLD: 15.7 % — HIGH (ref 10.3–14.5)
RH IG SCN BLD-IMP: POSITIVE — SIGNIFICANT CHANGE UP
SODIUM SERPL-SCNC: 133 MMOL/L — LOW (ref 135–145)
WBC # BLD: 10.3 K/UL — SIGNIFICANT CHANGE UP (ref 3.8–10.5)
WBC # BLD: 11.06 K/UL — HIGH (ref 3.8–10.5)
WBC # BLD: 11.47 K/UL — HIGH (ref 3.8–10.5)
WBC # FLD AUTO: 10.3 K/UL — SIGNIFICANT CHANGE UP (ref 3.8–10.5)
WBC # FLD AUTO: 11.06 K/UL — HIGH (ref 3.8–10.5)
WBC # FLD AUTO: 11.47 K/UL — HIGH (ref 3.8–10.5)

## 2022-01-25 PROCEDURE — 74178 CT ABD&PLV WO CNTR FLWD CNTR: CPT | Mod: 26

## 2022-01-25 RX ORDER — GABAPENTIN 400 MG/1
300 CAPSULE ORAL
Refills: 0 | Status: DISCONTINUED | OUTPATIENT
Start: 2022-01-25 | End: 2022-01-31

## 2022-01-25 RX ORDER — ATORVASTATIN CALCIUM 80 MG/1
10 TABLET, FILM COATED ORAL AT BEDTIME
Refills: 0 | Status: DISCONTINUED | OUTPATIENT
Start: 2022-01-25 | End: 2022-01-31

## 2022-01-25 RX ADMIN — INSULIN GLARGINE 10 UNIT(S): 100 INJECTION, SOLUTION SUBCUTANEOUS at 22:18

## 2022-01-25 RX ADMIN — HEPARIN SODIUM 5000 UNIT(S): 5000 INJECTION INTRAVENOUS; SUBCUTANEOUS at 13:32

## 2022-01-25 RX ADMIN — HYDROMORPHONE HYDROCHLORIDE 0.5 MILLIGRAM(S): 2 INJECTION INTRAMUSCULAR; INTRAVENOUS; SUBCUTANEOUS at 14:00

## 2022-01-25 RX ADMIN — HYDROMORPHONE HYDROCHLORIDE 0.5 MILLIGRAM(S): 2 INJECTION INTRAMUSCULAR; INTRAVENOUS; SUBCUTANEOUS at 09:17

## 2022-01-25 RX ADMIN — HYDROMORPHONE HYDROCHLORIDE 0.5 MILLIGRAM(S): 2 INJECTION INTRAMUSCULAR; INTRAVENOUS; SUBCUTANEOUS at 13:32

## 2022-01-25 RX ADMIN — SODIUM CHLORIDE 3 MILLILITER(S): 9 INJECTION INTRAMUSCULAR; INTRAVENOUS; SUBCUTANEOUS at 06:38

## 2022-01-25 RX ADMIN — HYDROMORPHONE HYDROCHLORIDE 0.5 MILLIGRAM(S): 2 INJECTION INTRAMUSCULAR; INTRAVENOUS; SUBCUTANEOUS at 09:35

## 2022-01-25 RX ADMIN — GABAPENTIN 300 MILLIGRAM(S): 400 CAPSULE ORAL at 17:08

## 2022-01-25 RX ADMIN — Medication 2: at 18:01

## 2022-01-25 RX ADMIN — DEXTROSE MONOHYDRATE, SODIUM CHLORIDE, AND POTASSIUM CHLORIDE 100 MILLILITER(S): 50; .745; 4.5 INJECTION, SOLUTION INTRAVENOUS at 06:52

## 2022-01-25 RX ADMIN — HEPARIN SODIUM 5000 UNIT(S): 5000 INJECTION INTRAVENOUS; SUBCUTANEOUS at 06:48

## 2022-01-25 RX ADMIN — SODIUM CHLORIDE 3 MILLILITER(S): 9 INJECTION INTRAMUSCULAR; INTRAVENOUS; SUBCUTANEOUS at 22:40

## 2022-01-25 RX ADMIN — PANTOPRAZOLE SODIUM 40 MILLIGRAM(S): 20 TABLET, DELAYED RELEASE ORAL at 12:46

## 2022-01-25 RX ADMIN — HYDROMORPHONE HYDROCHLORIDE 0.5 MILLIGRAM(S): 2 INJECTION INTRAMUSCULAR; INTRAVENOUS; SUBCUTANEOUS at 03:08

## 2022-01-25 RX ADMIN — DEXTROSE MONOHYDRATE, SODIUM CHLORIDE, AND POTASSIUM CHLORIDE 100 MILLILITER(S): 50; .745; 4.5 INJECTION, SOLUTION INTRAVENOUS at 21:42

## 2022-01-25 RX ADMIN — SODIUM CHLORIDE 3 MILLILITER(S): 9 INJECTION INTRAMUSCULAR; INTRAVENOUS; SUBCUTANEOUS at 01:44

## 2022-01-25 RX ADMIN — Medication 1: at 11:51

## 2022-01-25 RX ADMIN — HYDROMORPHONE HYDROCHLORIDE 0.5 MILLIGRAM(S): 2 INJECTION INTRAMUSCULAR; INTRAVENOUS; SUBCUTANEOUS at 17:10

## 2022-01-25 RX ADMIN — Medication 1: at 07:46

## 2022-01-25 RX ADMIN — ATORVASTATIN CALCIUM 10 MILLIGRAM(S): 80 TABLET, FILM COATED ORAL at 22:17

## 2022-01-25 RX ADMIN — SODIUM CHLORIDE 3 MILLILITER(S): 9 INJECTION INTRAMUSCULAR; INTRAVENOUS; SUBCUTANEOUS at 13:38

## 2022-01-25 RX ADMIN — DEXTROSE MONOHYDRATE, SODIUM CHLORIDE, AND POTASSIUM CHLORIDE 100 MILLILITER(S): 50; .745; 4.5 INJECTION, SOLUTION INTRAVENOUS at 11:52

## 2022-01-25 NOTE — PROGRESS NOTE ADULT - ASSESSMENT
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 Admitted to SICU for concern of abdominal compartment syndrome. Patient now on surgical floor since 1/23.    -F/u up AM CBC  - continue abdominal binder  - continue JOURDAN drains  -pain control prn  - will continue to follow      Plastic Surgery (pg [facility]: [id number], [facility]: [phone])

## 2022-01-25 NOTE — PROGRESS NOTE ADULT - SUBJECTIVE AND OBJECTIVE BOX
Surgery Progress Note    INTERVAl/SUBJECTIVE: No acute event overnight.     Vital Signs Last 24 Hrs  T(C): 36.9 (25 Jan 2022 01:54), Max: 37.6 (24 Jan 2022 21:50)  T(F): 98.5 (25 Jan 2022 01:54), Max: 99.6 (24 Jan 2022 21:50)  HR: 82 (25 Jan 2022 01:54) (74 - 88)  BP: 116/54 (25 Jan 2022 01:54) (111/57 - 124/64)  BP(mean): --  RR: 17 (25 Jan 2022 01:54) (16 - 17)  SpO2: 100% (25 Jan 2022 01:54) (99% - 100%)    Physical Exam:  General:  Neuro:    CV:   Abdomen:     LABS:                        7.8    10.86 )-----------( 103      ( 24 Jan 2022 06:51 )             23.1     01-24    138  |  101  |  10  ----------------------------<  80  4.1   |  24  |  0.57    Ca    8.5      24 Jan 2022 06:51  Phos  2.9     01-24  Mg     1.70     01-24            INs and OUTs:    01-23-22 @ 07:01 - 01-24-22 @ 07:00  --------------------------------------------------------  IN: 450 mL / OUT: 2802.5 mL / NET: -2352.5 mL    01-24-22 @ 07:01 - 01-25-22 @ 02:25  --------------------------------------------------------  IN: 0 mL / OUT: 369 mL / NET: -369 mL     Surgery Daily Progress Note  =====================================================  Interval / Overnight Events: No acute events overnight.      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  H/O Whipple procedure  1/11/2019  Hemorrhage of gastroduodenal artery  s/p surgery 1/18/19  H/O drainage of abscess  and abdominal wall repair 1/21/19  H/O eye surgery  Admission for chemotherapy  right chest wall port      ALLERGIES:  No Known Allergies    --------------------------------------------------------------------------------------    MEDICATIONS:    Neurologic Medications  HYDROmorphone  Injectable 0.25 milliGRAM(s) IV Push every 4 hours PRN Moderate Pain (4 - 6)  HYDROmorphone  Injectable 0.5 milliGRAM(s) IV Push every 4 hours PRN Severe Pain (7 - 10)  ondansetron Injectable 4 milliGRAM(s) IV Push every 6 hours PRN Nausea    Gastrointestinal Medications  dextrose 5% + sodium chloride 0.45% with potassium chloride 20 mEq/L 1000 milliLiter(s) IV Continuous <Continuous>  dextrose 5%. 1000 milliLiter(s) IV Continuous <Continuous>  dextrose 5%. 1000 milliLiter(s) IV Continuous <Continuous>  pantoprazole  Injectable 40 milliGRAM(s) IV Push daily  sodium chloride 0.9% lock flush 3 milliLiter(s) IV Push every 8 hours    Hematologic/Oncologic Medications  heparin   Injectable 5000 Unit(s) SubCutaneous every 8 hours  influenza   Vaccine 0.5 milliLiter(s) IntraMuscular once    Endocrine/Metabolic Medications  dextrose 40% Gel 15 Gram(s) Oral once  dextrose 50% Injectable 25 Gram(s) IV Push once  dextrose 50% Injectable 12.5 Gram(s) IV Push once  dextrose 50% Injectable 25 Gram(s) IV Push once  glucagon  Injectable 1 milliGRAM(s) IntraMuscular once  insulin glargine Injectable (LANTUS) 10 Unit(s) SubCutaneous at bedtime  insulin lispro (ADMELOG) corrective regimen sliding scale   SubCutaneous three times a day before meals  insulin lispro (ADMELOG) corrective regimen sliding scale   SubCutaneous at bedtime    Topical/Other Medications  naloxone Injectable 0.1 milliGRAM(s) IV Push every 3 minutes PRN For ANY of the following changes in patient status:  A. RR LESS THAN 10 breaths per minute, B. Oxygen saturation LESS THAN 90%, C. Sedation score of 6    --------------------------------------------------------------------------------------    VITAL SIGNS:  T(C): 36.5 (25 Jan 2022 06:03), Max: 37.6 (24 Jan 2022 21:50)  T(F): 97.7 (25 Jan 2022 06:03), Max: 99.6 (24 Jan 2022 21:50)  HR: 91 (25 Jan 2022 06:03) (74 - 91)  BP: 108/53 (25 Jan 2022 06:03) (108/53 - 118/55)  RR: 18 (25 Jan 2022 06:03) (16 - 18)  SpO2: 100% (25 Jan 2022 06:03) (99% - 100%)    --------------------------------------------------------------------------------------    INS AND OUTS:    24 Jan 2022 07:01  -  25 Jan 2022 07:00  --------------------------------------------------------  IN:  Total IN: 0 mL    OUT:    Bulb (mL): 23.5 mL    Bulb (mL): 35 mL    Nasogastric/Oral tube (mL): 15 mL    Voided (mL): 500 mL  Total OUT: 573.5 mL    Total NET: -573.5 mL      25 Jan 2022 07:01  -  25 Jan 2022 07:31  --------------------------------------------------------  IN:  Total IN: 0 mL    OUT:    Bulb (mL): 7.5 mL    Bulb (mL): 20 mL  Total OUT: 27.5 mL    Total NET: -27.5 mL    --------------------------------------------------------------------------------------    EXAM    NEUROLOGY  Exam: Normal, in no acute distress.    HEENT  Exam: Normocephalic, atraumatic.    RESPIRATORY  Exam: Normal expansion / effort.    CARDIOVASCULAR  Exam: S1, S2.  Regular rate and rhythm.    GI/NUTRITION  Exam: Abdomen softly distended.  Minimal tenderness to palpation.  Ag aquacel over midline incision.  JOURDAN drains x2 with serosanguinous output.  NGT.  Abdominal binder.  Current Diet: Clear liquid diet    MUSCULOSKELETAL  Exam: All extremities moving spontaneously without limitations.      METABOLIC / FLUIDS / ELECTROLYTES  dextrose 5% + sodium chloride 0.45% with potassium chloride 20 mEq/L 1000 milliLiter(s) IV Continuous <Continuous>  dextrose 5%. 1000 milliLiter(s) IV Continuous <Continuous>  dextrose 5%. 1000 milliLiter(s) IV Continuous <Continuous>  sodium chloride 0.9% lock flush 3 milliLiter(s) IV Push every 8 hours      HEMATOLOGIC  [x] VTE Prophylaxis: heparin   Injectable 5000 Unit(s) SubCutaneous every 8 hours      INFECTIOUS DISEASE  Antimicrobials/Immunologic Medications:  influenza   Vaccine 0.5 milliLiter(s) IntraMuscular once    --------------------------------------------------------------------------------------

## 2022-01-25 NOTE — PROGRESS NOTE ADULT - ASSESSMENT
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  Patient is now S/P ventral hernia repair with mesh on 1/21/22.      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  Patient is now S/P ventral hernia repair with mesh on 1/21/22.      PLAN:  - Clear liquid diet  - D5 + 1/2NS @100cc/hr  - Monitor JOURDAN drain output  - Out of bed  - Pain control  - VTE prophylaxis with Heparin subcutaneous      #[id number]  D Team Surgery

## 2022-01-25 NOTE — PROGRESS NOTE ADULT - SUBJECTIVE AND OBJECTIVE BOX
PLASTIC SURGERY PROGRESS NOTE      SUBJECTIVE/ROS:   No acute events overnight           OBJECTIVE:    Vital Signs Last 24 Hrs  T(C): 36.5 (25 Jan 2022 06:03), Max: 37.6 (24 Jan 2022 21:50)  T(F): 97.7 (25 Jan 2022 06:03), Max: 99.6 (24 Jan 2022 21:50)  HR: 91 (25 Jan 2022 06:03) (74 - 91)  BP: 108/53 (25 Jan 2022 06:03) (108/53 - 118/55)  RR: 18 (25 Jan 2022 06:03) (16 - 18)  SpO2: 100% (25 Jan 2022 06:03) (99% - 100%)    PHYSICAL EXAM:  Constitutional:  NAD  Respiratory: Non-labored breathing  Gastrointestinal: Abdomen soft, moderately tender, slightly distended. Left JOURDAN slightly more sanguinous today, no clots. Right JOURDAN serosanguinous  Extremities:   FROM,      I&Os:  I&O's Detail    24 Jan 2022 07:01  -  25 Jan 2022 07:00  --------------------------------------------------------  IN:  Total IN: 0 mL    OUT:    Bulb (mL): 23.5 mL    Bulb (mL): 35 mL    Nasogastric/Oral tube (mL): 15 mL    Voided (mL): 500 mL  Total OUT: 573.5 mL    Total NET: -573.5 mL          LABS:                        7.8    10.86 )-----------( 103      ( 24 Jan 2022 06:51 )             23.1     01-24    138  |  101  |  10  ----------------------------<  80  4.1   |  24  |  0.57    Ca    8.5      24 Jan 2022 06:51  Phos  2.9     01-24  Mg     1.70     01-24

## 2022-01-25 NOTE — CONSULT NOTE ADULT - SUBJECTIVE AND OBJECTIVE BOX
Interventional Radiology    Evaluate for Procedure: Post-op bleed    HPI: 57y Female s/p ventral hernia mesh repair. Patient now with acute drop in Hgb to 6.3 with good response to 1u pRBC. CT reviewed with tiny focal punctate areas of hemorrhage. Hep sq given earlier today and now d/c'ed.     Allergies:   Medications (Abx/Cardiac/Anticoagulation/Blood Products)    heparin   Injectable: 5000 Unit(s) SubCutaneous (01-25 @ 13:32)    Data:    T(C): 37.1  HR: 93  BP: 103/65  RR: 16  SpO2: 93%    -WBC 10.30 / HgB 7.2 / Hct 22.7 / Plt 122  -Na 133 / Cl 98 / BUN 11 / Glucose 140  -K 4.4 / CO2 27 / Cr 0.56  -ALT 97 / Alk Phos 79 / T.Bili 1.0  -INR 1.30 / PTT 32.0      Radiology: reviewed    Assessment/Plan: 58 yo F with tiny focal punctate areas of hemorrhage s/p ventral hernia mesh repair.    -- No acute IR intervention at this time  --c/w conservative management with pRBC, factors and platelets  --place abdominal binder on (already on per team)  --IR is available if pt clinically deterioates Interventional Radiology    Evaluate for Procedure: Post-op bleed    HPI: 57y Female s/p ventral hernia mesh repair. Patient now with acute drop in Hgb to 6.3 with good response to 1u pRBC. CT reviewed with tiny focal punctate areas of hemorrhage. Hep sq given earlier today and now d/c'ed.     Allergies:   Medications (Abx/Cardiac/Anticoagulation/Blood Products)    heparin   Injectable: 5000 Unit(s) SubCutaneous (01-25 @ 13:32)    Data:    T(C): 37.1  HR: 93  BP: 103/65  RR: 16  SpO2: 93%    -WBC 10.30 / HgB 7.2 / Hct 22.7 / Plt 122  -Na 133 / Cl 98 / BUN 11 / Glucose 140  -K 4.4 / CO2 27 / Cr 0.56  -ALT 97 / Alk Phos 79 / T.Bili 1.0  -INR 1.30 / PTT 32.0      Radiology: reviewed    Assessment/Plan: 56 yo F with tiny focal punctate areas of hemorrhage s/p ventral hernia mesh repair.    -- No acute IR intervention at this time  -- c/w conservative management with pRBC, factors and platelets  -- place abdominal binder on (already on per team)  -- IR is available if pt clinically deteriorates and does not respond to appropriate conservative measures.

## 2022-01-26 LAB
ALBUMIN SERPL ELPH-MCNC: 3.1 G/DL — LOW (ref 3.3–5)
ALP SERPL-CCNC: 86 U/L — SIGNIFICANT CHANGE UP (ref 40–120)
ALT FLD-CCNC: 70 U/L — HIGH (ref 4–33)
ANION GAP SERPL CALC-SCNC: 5 MMOL/L — LOW (ref 7–14)
ANION GAP SERPL CALC-SCNC: 8 MMOL/L — SIGNIFICANT CHANGE UP (ref 7–14)
APTT BLD: 31 SEC — SIGNIFICANT CHANGE UP (ref 27–36.3)
AST SERPL-CCNC: 50 U/L — HIGH (ref 4–32)
BILIRUB SERPL-MCNC: 1.6 MG/DL — HIGH (ref 0.2–1.2)
BLD GP AB SCN SERPL QL: NEGATIVE — SIGNIFICANT CHANGE UP
BUN SERPL-MCNC: 5 MG/DL — LOW (ref 7–23)
BUN SERPL-MCNC: 6 MG/DL — LOW (ref 7–23)
CALCIUM SERPL-MCNC: 7.8 MG/DL — LOW (ref 8.4–10.5)
CALCIUM SERPL-MCNC: 8.9 MG/DL — SIGNIFICANT CHANGE UP (ref 8.4–10.5)
CHLORIDE SERPL-SCNC: 103 MMOL/L — SIGNIFICANT CHANGE UP (ref 98–107)
CHLORIDE SERPL-SCNC: 104 MMOL/L — SIGNIFICANT CHANGE UP (ref 98–107)
CO2 SERPL-SCNC: 25 MMOL/L — SIGNIFICANT CHANGE UP (ref 22–31)
CO2 SERPL-SCNC: 28 MMOL/L — SIGNIFICANT CHANGE UP (ref 22–31)
CREAT SERPL-MCNC: 0.58 MG/DL — SIGNIFICANT CHANGE UP (ref 0.5–1.3)
CREAT SERPL-MCNC: 0.59 MG/DL — SIGNIFICANT CHANGE UP (ref 0.5–1.3)
GLUCOSE BLDC GLUCOMTR-MCNC: 132 MG/DL — HIGH (ref 70–99)
GLUCOSE BLDC GLUCOMTR-MCNC: 158 MG/DL — HIGH (ref 70–99)
GLUCOSE BLDC GLUCOMTR-MCNC: 177 MG/DL — HIGH (ref 70–99)
GLUCOSE BLDC GLUCOMTR-MCNC: 199 MG/DL — HIGH (ref 70–99)
GLUCOSE SERPL-MCNC: 152 MG/DL — HIGH (ref 70–99)
GLUCOSE SERPL-MCNC: 210 MG/DL — HIGH (ref 70–99)
HCT VFR BLD CALC: 20.3 % — CRITICAL LOW (ref 34.5–45)
HCT VFR BLD CALC: 31.2 % — LOW (ref 34.5–45)
HGB BLD-MCNC: 10.2 G/DL — LOW (ref 11.5–15.5)
HGB BLD-MCNC: 6.5 G/DL — CRITICAL LOW (ref 11.5–15.5)
INR BLD: 1.2 RATIO — HIGH (ref 0.88–1.16)
MAGNESIUM SERPL-MCNC: 1.9 MG/DL — SIGNIFICANT CHANGE UP (ref 1.6–2.6)
MAGNESIUM SERPL-MCNC: 1.9 MG/DL — SIGNIFICANT CHANGE UP (ref 1.6–2.6)
MCHC RBC-ENTMCNC: 25.4 PG — LOW (ref 27–34)
MCHC RBC-ENTMCNC: 25.4 PG — LOW (ref 27–34)
MCHC RBC-ENTMCNC: 32 GM/DL — SIGNIFICANT CHANGE UP (ref 32–36)
MCHC RBC-ENTMCNC: 32.7 GM/DL — SIGNIFICANT CHANGE UP (ref 32–36)
MCV RBC AUTO: 77.6 FL — LOW (ref 80–100)
MCV RBC AUTO: 79.3 FL — LOW (ref 80–100)
NRBC # BLD: 0 /100 WBCS — SIGNIFICANT CHANGE UP
NRBC # BLD: 0 /100 WBCS — SIGNIFICANT CHANGE UP
NRBC # FLD: 0.05 K/UL — HIGH
NRBC # FLD: 0.06 K/UL — HIGH
PHOSPHATE SERPL-MCNC: 1.6 MG/DL — LOW (ref 2.5–4.5)
PHOSPHATE SERPL-MCNC: 2.9 MG/DL — SIGNIFICANT CHANGE UP (ref 2.5–4.5)
PLATELET # BLD AUTO: 106 K/UL — LOW (ref 150–400)
PLATELET # BLD AUTO: 112 K/UL — LOW (ref 150–400)
POTASSIUM SERPL-MCNC: 4.4 MMOL/L — SIGNIFICANT CHANGE UP (ref 3.5–5.3)
POTASSIUM SERPL-MCNC: 4.4 MMOL/L — SIGNIFICANT CHANGE UP (ref 3.5–5.3)
POTASSIUM SERPL-SCNC: 4.4 MMOL/L — SIGNIFICANT CHANGE UP (ref 3.5–5.3)
POTASSIUM SERPL-SCNC: 4.4 MMOL/L — SIGNIFICANT CHANGE UP (ref 3.5–5.3)
PROT SERPL-MCNC: 5.8 G/DL — LOW (ref 6–8.3)
PROTHROM AB SERPL-ACNC: 13.7 SEC — HIGH (ref 10.6–13.6)
RBC # BLD: 2.56 M/UL — LOW (ref 3.8–5.2)
RBC # BLD: 4.02 M/UL — SIGNIFICANT CHANGE UP (ref 3.8–5.2)
RBC # FLD: 15.9 % — HIGH (ref 10.3–14.5)
RBC # FLD: 16.7 % — HIGH (ref 10.3–14.5)
RH IG SCN BLD-IMP: POSITIVE — SIGNIFICANT CHANGE UP
SARS-COV-2 RNA SPEC QL NAA+PROBE: SIGNIFICANT CHANGE UP
SODIUM SERPL-SCNC: 136 MMOL/L — SIGNIFICANT CHANGE UP (ref 135–145)
SODIUM SERPL-SCNC: 137 MMOL/L — SIGNIFICANT CHANGE UP (ref 135–145)
SURGICAL PATHOLOGY STUDY: SIGNIFICANT CHANGE UP
WBC # BLD: 8.89 K/UL — SIGNIFICANT CHANGE UP (ref 3.8–10.5)
WBC # BLD: 9.29 K/UL — SIGNIFICANT CHANGE UP (ref 3.8–10.5)
WBC # FLD AUTO: 8.89 K/UL — SIGNIFICANT CHANGE UP (ref 3.8–10.5)
WBC # FLD AUTO: 9.29 K/UL — SIGNIFICANT CHANGE UP (ref 3.8–10.5)

## 2022-01-26 PROCEDURE — 37244 VASC EMBOLIZE/OCCLUDE BLEED: CPT

## 2022-01-26 PROCEDURE — 36247 INS CATH ABD/L-EXT ART 3RD: CPT

## 2022-01-26 PROCEDURE — 76937 US GUIDE VASCULAR ACCESS: CPT | Mod: 26

## 2022-01-26 PROCEDURE — 36245 INS CATH ABD/L-EXT ART 1ST: CPT | Mod: 59

## 2022-01-26 RX ORDER — ACETAMINOPHEN 500 MG
650 TABLET ORAL EVERY 6 HOURS
Refills: 0 | Status: DISCONTINUED | OUTPATIENT
Start: 2022-01-26 | End: 2022-01-27

## 2022-01-26 RX ADMIN — HYDROMORPHONE HYDROCHLORIDE 0.5 MILLIGRAM(S): 2 INJECTION INTRAMUSCULAR; INTRAVENOUS; SUBCUTANEOUS at 18:13

## 2022-01-26 RX ADMIN — GABAPENTIN 300 MILLIGRAM(S): 400 CAPSULE ORAL at 21:12

## 2022-01-26 RX ADMIN — Medication 260 MILLIGRAM(S): at 21:11

## 2022-01-26 RX ADMIN — SODIUM CHLORIDE 3 MILLILITER(S): 9 INJECTION INTRAMUSCULAR; INTRAVENOUS; SUBCUTANEOUS at 08:54

## 2022-01-26 RX ADMIN — Medication 650 MILLIGRAM(S): at 11:45

## 2022-01-26 RX ADMIN — HYDROMORPHONE HYDROCHLORIDE 0.5 MILLIGRAM(S): 2 INJECTION INTRAMUSCULAR; INTRAVENOUS; SUBCUTANEOUS at 18:19

## 2022-01-26 RX ADMIN — GABAPENTIN 300 MILLIGRAM(S): 400 CAPSULE ORAL at 05:48

## 2022-01-26 RX ADMIN — DEXTROSE MONOHYDRATE, SODIUM CHLORIDE, AND POTASSIUM CHLORIDE 100 MILLILITER(S): 50; .745; 4.5 INJECTION, SOLUTION INTRAVENOUS at 21:11

## 2022-01-26 RX ADMIN — Medication 260 MILLIGRAM(S): at 14:26

## 2022-01-26 RX ADMIN — SODIUM CHLORIDE 3 MILLILITER(S): 9 INJECTION INTRAMUSCULAR; INTRAVENOUS; SUBCUTANEOUS at 11:45

## 2022-01-26 RX ADMIN — INSULIN GLARGINE 10 UNIT(S): 100 INJECTION, SOLUTION SUBCUTANEOUS at 23:07

## 2022-01-26 RX ADMIN — PANTOPRAZOLE SODIUM 40 MILLIGRAM(S): 20 TABLET, DELAYED RELEASE ORAL at 11:48

## 2022-01-26 RX ADMIN — Medication 260 MILLIGRAM(S): at 09:02

## 2022-01-26 RX ADMIN — Medication 650 MILLIGRAM(S): at 14:26

## 2022-01-26 RX ADMIN — HYDROMORPHONE HYDROCHLORIDE 0.25 MILLIGRAM(S): 2 INJECTION INTRAMUSCULAR; INTRAVENOUS; SUBCUTANEOUS at 21:12

## 2022-01-26 RX ADMIN — HYDROMORPHONE HYDROCHLORIDE 0.5 MILLIGRAM(S): 2 INJECTION INTRAMUSCULAR; INTRAVENOUS; SUBCUTANEOUS at 04:01

## 2022-01-26 RX ADMIN — SODIUM CHLORIDE 3 MILLILITER(S): 9 INJECTION INTRAMUSCULAR; INTRAVENOUS; SUBCUTANEOUS at 21:02

## 2022-01-26 RX ADMIN — Medication 63.75 MILLIMOLE(S): at 11:48

## 2022-01-26 RX ADMIN — ATORVASTATIN CALCIUM 10 MILLIGRAM(S): 80 TABLET, FILM COATED ORAL at 21:12

## 2022-01-26 NOTE — PROGRESS NOTE ADULT - SUBJECTIVE AND OBJECTIVE BOX
Surgery Progress Note    INTERVAl/SUBJECTIVE: No acute event overnight.  Repeat CBC with stable Hg.     Vital Signs Last 24 Hrs  T(C): 37.5 (26 Jan 2022 00:04), Max: 37.5 (26 Jan 2022 00:04)  T(F): 99.5 (26 Jan 2022 00:04), Max: 99.5 (26 Jan 2022 00:04)  HR: 91 (26 Jan 2022 00:04) (82 - 96)  BP: 111/54 (26 Jan 2022 00:04) (98/47 - 116/54)  BP(mean): --  RR: 17 (26 Jan 2022 00:04) (16 - 18)  SpO2: 100% (26 Jan 2022 00:04) (92% - 100%)    Physical Exam:  General:  Neuro:    CV:   Abdomen:     LABS:                        7.8    11.06 )-----------( 127      ( 25 Jan 2022 21:17 )             23.8     01-25    133<L>  |  98  |  11  ----------------------------<  140<H>  4.4   |  27  |  0.56    Ca    7.9<L>      25 Jan 2022 07:20  Phos  2.6     01-25  Mg     2.00     01-25    TPro  5.8<L>  /  Alb  3.1<L>  /  TBili  1.0  /  DBili  x   /  AST  71<H>  /  ALT  97<H>  /  AlkPhos  79  01-25    PT/INR - ( 25 Jan 2022 21:17 )   PT: 14.5 sec;   INR: 1.29 ratio         PTT - ( 25 Jan 2022 21:17 )  PTT:36.4 sec      INs and OUTs:    01-24-22 @ 07:01  -  01-25-22 @ 07:00  --------------------------------------------------------  IN: 0 mL / OUT: 573.5 mL / NET: -573.5 mL    01-25-22 @ 07:01  -  01-26-22 @ 00:45  --------------------------------------------------------  IN: 500 mL / OUT: 1119 mL / NET: -619 mL     Surgery Daily Progress Note  =====================================================  Interval / Overnight Events: CTA performed yesterday with concern for active bleed.  IR consulted - no intervention performed.      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 1000 milliGRAM(s) IV Intermittent every 6 hours  gabapentin 300 milliGRAM(s) Oral two times a day  HYDROmorphone  Injectable 0.25 milliGRAM(s) IV Push every 4 hours PRN Moderate Pain (4 - 6)  HYDROmorphone  Injectable 0.5 milliGRAM(s) IV Push every 4 hours PRN Severe Pain (7 - 10)  ondansetron Injectable 4 milliGRAM(s) IV Push every 6 hours PRN Nausea    Gastrointestinal Medications  dextrose 5% + sodium chloride 0.45% with potassium chloride 20 mEq/L 1000 milliLiter(s) IV Continuous <Continuous>  dextrose 5%. 1000 milliLiter(s) IV Continuous <Continuous>  dextrose 5%. 1000 milliLiter(s) IV Continuous <Continuous>  pantoprazole  Injectable 40 milliGRAM(s) IV Push daily  sodium chloride 0.9% lock flush 3 milliLiter(s) IV Push every 8 hours    Hematologic/Oncologic Medications  influenza   Vaccine 0.5 milliLiter(s) IntraMuscular once    Endocrine/Metabolic Medications  atorvastatin 10 milliGRAM(s) Oral at bedtime  dextrose 40% Gel 15 Gram(s) Oral once  dextrose 50% Injectable 25 Gram(s) IV Push once  dextrose 50% Injectable 12.5 Gram(s) IV Push once  dextrose 50% Injectable 25 Gram(s) IV Push once  glucagon  Injectable 1 milliGRAM(s) IntraMuscular once  insulin glargine Injectable (LANTUS) 10 Unit(s) SubCutaneous at bedtime  insulin lispro (ADMELOG) corrective regimen sliding scale   SubCutaneous three times a day before meals  insulin lispro (ADMELOG) corrective regimen sliding scale   SubCutaneous at bedtime    Topical/Other Medications  naloxone Injectable 0.1 milliGRAM(s) IV Push every 3 minutes PRN For ANY of the following changes in patient status:  A. RR LESS THAN 10 breaths per minute, B. Oxygen saturation LESS THAN 90%, C. Sedation score of 6    --------------------------------------------------------------------------------------    VITAL SIGNS:  T(C): 36.7 (26 Jan 2022 04:09), Max: 37.5 (26 Jan 2022 00:04)  T(F): 98.1 (26 Jan 2022 04:09), Max: 99.5 (26 Jan 2022 00:04)  HR: 88 (26 Jan 2022 04:09) (88 - 96)  BP: 115/52 (26 Jan 2022 04:09) (98/47 - 115/52)  RR: 17 (26 Jan 2022 04:09) (16 - 18)  SpO2: 98% (26 Jan 2022 04:09) (92% - 100%)    --------------------------------------------------------------------------------------    INS AND OUTS:    24 Jan 2022 07:01  -  25 Jan 2022 07:00  --------------------------------------------------------  IN:  Total IN: 0 mL    OUT:    Bulb (mL): 23.5 mL    Bulb (mL): 35 mL    Nasogastric/Oral tube (mL): 15 mL    Voided (mL): 500 mL  Total OUT: 573.5 mL    Total NET: -573.5 mL      25 Jan 2022 07:01  -  26 Jan 2022 06:01  --------------------------------------------------------  IN:    dextrose 5% + sodium chloride 0.45% w/ Additives: 1000 mL  Total IN: 1000 mL    OUT:    Bulb (mL): 49.5 mL    Bulb (mL): 19.5 mL    Indwelling Catheter - Urethral (mL): 850 mL    Voided (mL): 600 mL  Total OUT: 1519 mL    Total NET: -519 mL    --------------------------------------------------------------------------------------    EXAM    NEUROLOGY  Exam: Normal, in no acute distress.    HEENT  Exam: Normocephalic, atraumatic.    RESPIRATORY  Exam: Normal expansion / effort.    CARDIOVASCULAR  Exam: S1, S2.  Regular rate and rhythm.    GI/NUTRITION  Exam: Abdomen softly distended.  Minimal tenderness to palpation.  Ag aquacel over midline incision.  JOURDAN drains x2 with serosanguinous output.  NGT.  Abdominal binder.  Current Diet: NPO    MUSCULOSKELETAL  Exam: All extremities moving spontaneously without limitations.      METABOLIC / FLUIDS / ELECTROLYTES  dextrose 5% + sodium chloride 0.45% with potassium chloride 20 mEq/L 1000 milliLiter(s) IV Continuous <Continuous>  dextrose 5%. 1000 milliLiter(s) IV Continuous <Continuous>  dextrose 5%. 1000 milliLiter(s) IV Continuous <Continuous>  sodium chloride 0.9% lock flush 3 milliLiter(s) IV Push every 8 hours      INFECTIOUS DISEASE  Antimicrobials/Immunologic Medications:  influenza   Vaccine 0.5 milliLiter(s) IntraMuscular once    --------------------------------------------------------------------------------------

## 2022-01-26 NOTE — PRE PROCEDURE NOTE - PRE PROCEDURE EVALUATION
Interventional Radiology Pre-Procedure Note    Procedure: Abdominal angiogram and possible embolization    Diagnosis/Indication: Patient is a 57y old female ventral hernia repair complicated by hematoma. CTA demonstrates active extravasation of contrast. Patient is anemic requiring blood transfusions. She presents to IR for abdominal angiogram and possible embolization.      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    H/O Whipple procedure  1/11/2019    Hemorrhage of gastroduodenal artery  s/p surgery 1/18/19    H/O drainage of abscess  and abdominal wall repair 1/21/19    H/O eye surgery    Admission for chemotherapy  right chest wall port         Female    Allergies: No Known Allergies      LABS:  CBC Full  -  ( 26 Jan 2022 07:21 )  WBC Count : 9.29 K/uL  RBC Count : 2.56 M/uL  Hemoglobin : 6.5 g/dL  Hematocrit : 20.3 %  Platelet Count - Automated : 112 K/uL  Mean Cell Volume : 79.3 fL  Mean Cell Hemoglobin : 25.4 pg  Mean Cell Hemoglobin Concentration : 32.0 gm/dL  Auto Neutrophil # : x  Auto Lymphocyte # : x  Auto Monocyte # : x  Auto Eosinophil # : x  Auto Basophil # : x  Auto Neutrophil % : x  Auto Lymphocyte % : x  Auto Monocyte % : x  Auto Eosinophil % : x  Auto Basophil % : x    01-26    136  |  103  |  6<L>  ----------------------------<  210<H>  4.4   |  28  |  0.58    Ca    7.8<L>      26 Jan 2022 07:21  Phos  1.6     01-26  Mg     1.90     01-26    TPro  5.8<L>  /  Alb  3.1<L>  /  TBili  1.6<H>  /  DBili  x   /  AST  50<H>  /  ALT  70<H>  /  AlkPhos  86  01-26    PT/INR - ( 25 Jan 2022 21:17 )   PT: 14.5 sec;   INR: 1.29 ratio         PTT - ( 25 Jan 2022 21:17 )  PTT:36.4 sec    Procedure/ risks/ benefits were explained and informed consent obtained from patient. The patient is Armenian speaking and declined . The daughter acted as .

## 2022-01-26 NOTE — PROGRESS NOTE ADULT - SUBJECTIVE AND OBJECTIVE BOX
SURGERY PROGRESS NOTE      SUBJECTIVE/ROS:   Patient received 1uPRBC yesterday given low H/H, post transfusion CBC with adequate response  CT Angio abdomen done yesterday significant for rectus muscle hematoma  with  concerns for pseudoaneurysm/active bleed  No acute events overnight  Patient still with abdominal pain this morning       OBJECTIVE:    Vital Signs Last 24 Hrs  T(C): 36.7 (26 Jan 2022 07:59), Max: 37.5 (26 Jan 2022 00:04)  T(F): 98.1 (26 Jan 2022 07:59), Max: 99.5 (26 Jan 2022 00:04)  HR: 87 (26 Jan 2022 07:59) (87 - 96)  BP: 98/53 (26 Jan 2022 07:59) (98/47 - 115/52)  BP(mean): --  RR: 18 (26 Jan 2022 07:59) (16 - 18)  SpO2: 100% (26 Jan 2022 07:59) (92% - 100%)    PHYSICAL EXAM:  Constitutional:  NAD  Neuro: AOx3  Respiratory: Non-labored breathing  Gastrointestinal: Abdomen soft, moderately tender, slightly distended. Left JOURDAN slightly more sanguinous today, no clots. Right JOURDAN serosanguinous  Extremities:   FROM,    I&Os:  I&O's Detail    25 Jan 2022 07:01  -  26 Jan 2022 07:00  --------------------------------------------------------  IN:    dextrose 5% + sodium chloride 0.45% w/ Additives: 1000 mL  Total IN: 1000 mL    OUT:    Bulb (mL): 49.5 mL    Bulb (mL): 19.5 mL    Indwelling Catheter - Urethral (mL): 850 mL    Voided (mL): 600 mL  Total OUT: 1519 mL    Total NET: -519 mL          LABS:                        6.5    9.29  )-----------( 112      ( 26 Jan 2022 07:21 )             20.3     01-26    136  |  103  |  6<L>  ----------------------------<  210<H>  4.4   |  28  |  0.58    Ca    7.8<L>      26 Jan 2022 07:21  Phos  2.6     01-25  Mg     1.90     01-26    TPro  5.8<L>  /  Alb  3.1<L>  /  TBili  1.6<H>  /  DBili  x   /  AST  50<H>  /  ALT  70<H>  /  AlkPhos  86  01-26    PT/INR - ( 25 Jan 2022 21:17 )   PT: 14.5 sec;   INR: 1.29 ratio         PTT - ( 25 Jan 2022 21:17 )  PTT:36.4 sec

## 2022-01-26 NOTE — PROGRESS NOTE ADULT - ASSESSMENT
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  Patient is now S/P ventral hernia repair with mesh on 1/21/22.      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  Patient is now S/P ventral hernia repair with mesh on 1/21/22.      PLAN:  - NPO  - D5 + 1/2NS @100cc/hr  - Monitor CBC  - Transfuse PRN  - Serial abdominal exams  - Monitor JOURDAN drain output  - Out of bed  - Pain control      #[id number]  D Team Surgery

## 2022-01-26 NOTE — PROCEDURE NOTE - PROCEDURE FINDINGS AND DETAILS
Angiogram of mesenteric axis and left iliac arterial system. Multiple small pseudoaneurysms in distal branch of left inferior epigastric artery were identified. The base of the left inferior epigastric artery was embolized via gel foam. Right groin hemostasis was achieve via closure device. Dorsal pedal pulses in tact bilaterally upon completion.

## 2022-01-26 NOTE — CHART NOTE - NSCHARTNOTEFT_GEN_A_CORE
PRE-INTERVENTIONAL RADIOLOGY PROCEDURE NOTE      Patient Age: 57 years old    Patient Gender: Female    Procedure: Angiogram / possible embolization    Diagnosis / Indication: Active extravasation on CT scan    Interventional Radiology Attending Physician: Dr. Bowman    Ordering Attending Physician: Dr. 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), repair of gastroduodenal artery bleed (1/18/19), drainage of abscess / repair of abdominal wall (1/21/19), now S/P ventral hernia repair with mesh with component separation on 1/21/22      Pertinent Labs:    CBC:                   6.5    9.29  )-----------( 112      ( 26 Jan 2022 07:21 )             20.3     CHEM:  136  |  103  |  6<L>  ----------------------------<  210<H>  4.4   |  28  |  0.58    Ca    7.8<L>      26 Jan 2022 07:21  Phos  1.6     01-26  Mg     1.90     01-26    TPro  5.8<L>  /  Alb  3.1<L>  /  TBili  1.6<H>  /  DBili  x   /  AST  50<H>  /  ALT  70<H>  /  AlkPhos  86  01-26    COAGULATION PANEL:  PT/INR - ( 25 Jan 2022 21:17 )   PT: 14.5 sec;   INR: 1.29 ratio    PTT - ( 25 Jan 2022 21:17 )  PTT:36.4 sec      Patient and Family Aware? Yes      #[id number]  D Team Surgery
Pt is s/p open ventral hernia repair POD# 0 with significant hx  of pancreatic CA c/b gastroduodenal artery bleed and drainage of abscess/repair of abdominal wall, GERD, HTN, NIDDM, asthma. On arrival to PACU, hand off given from surgical service to monitor patient closely 2/2 concern for development of abdominal compartment syndrome. Urine output decreased noted from 25cc/hr to <5cc/hr. Albumin 500cc x 1 given with initial  improvement, however UO continued to trend down. D-team surgery made aware and labs drawn. Laccate elevated to 8 with electrolyte derangements of K+ 5.7, Phos 6.5, Mag 1.4.  LR 1 L bolus given and repeat labs drawn. On exam, pt remains lethargic, but arousable. Italian speaking and through use of  services, pt is oriented to self and place. but unable to verbalize much else. Plastic surgery and D-team resident present at bedside for exam-abdominal binder removed and dressings are clean, dry, and intact along vertical incision. B/L JPs draining serosanguinous (Rt 125cc shift total , Lt 80cc shift total). Abdomen is taut- Rt side is slightly more tense than Lt side, however overall soft and mildly tender to palpation. Decision made for SICU consult, given labs, patient's mental status, and overall clinical condition. Pt still must be monitored very closely at this time for compartment syndrome and other potential post operative complications. Repeat labs are pending, SICU consult in progress, VS:  sinus, /64,, 02sat 100% on 2 L 02, RR 20. Pt to remain in PACU until appropriate discharge criteria met and  subsequent level of care determined.
Seen and examined patient at 7pm yesterday, patient was lying on bed talking with her daughter. Complained about abdominal pain, the same as yesterday. Examination: abdominal soft, mild distended, no tender, normal volume of urine output.    Seen and examined again patient at 3am, patient was sleeping comfortable.  No complains. Examination: abdominal soft, same as last examination, mild distended, no tender, normal volume of urine output.   Will keep observing.
Surgery Post op Note    Procedure : IR abdominopelvic angiogram and left inferior epigastric artery embolization    SUBJECTIVE: Pt seen and examined at bedside several hours after surgery.   SOB:  [ ] YES [ ] NO  Chest Discomfort: [ ] YES [ ] NO    Nausea: [ ] YES [ ] NO           Vomiting: [ ] YES [ ] NO  Flatus: [ ] YES [ ] NO             Bowel Movement: [ ] YES [ ] NO  Void: [ ]YES [ ]No         Pain Control Adequate: [ ] YES [ ] NO      Physical exam  General Appearance: Appears well, NAD  Respiratory: No labored breathing  CV: Pulse regularly present  Abdomen: Soft, nontender, ***incision c/d/i    Vital Signs Last 24 Hrs  T(C): 36.9 (26 Jan 2022 20:58), Max: 37.5 (26 Jan 2022 00:04)  T(F): 98.4 (26 Jan 2022 20:58), Max: 99.5 (26 Jan 2022 00:04)  HR: 94 (26 Jan 2022 20:58) (83 - 94)  BP: 137/66 (26 Jan 2022 20:58) (98/53 - 137/66)  BP(mean): --  RR: 17 (26 Jan 2022 20:58) (17 - 18)  SpO2: 93% (26 Jan 2022 20:58) (93% - 100%)  I&O's Summary    25 Jan 2022 07:01  -  26 Jan 2022 07:00  --------------------------------------------------------  IN: 1000 mL / OUT: 1519 mL / NET: -519 mL    26 Jan 2022 07:01  -  26 Jan 2022 21:54  --------------------------------------------------------  IN: 0 mL / OUT: 2200 mL / NET: -2200 mL      I&O's Detail    25 Jan 2022 07:01  -  26 Jan 2022 07:00  --------------------------------------------------------  IN:    dextrose 5% + sodium chloride 0.45% w/ Additives: 1000 mL  Total IN: 1000 mL    OUT:    Bulb (mL): 49.5 mL    Bulb (mL): 19.5 mL    Indwelling Catheter - Urethral (mL): 850 mL    Voided (mL): 600 mL  Total OUT: 1519 mL    Total NET: -519 mL      26 Jan 2022 07:01  -  26 Jan 2022 21:54  --------------------------------------------------------  IN:  Total IN: 0 mL    OUT:    Bulb (mL): 0 mL    Bulb (mL): 0 mL    Voided (mL): 2200 mL  Total OUT: 2200 mL    Total NET: -2200 mL          MEDICATIONS  (STANDING):  acetaminophen   IVPB .. 650 milliGRAM(s) IV Intermittent every 6 hours  atorvastatin 10 milliGRAM(s) Oral at bedtime  dextrose 40% Gel 15 Gram(s) Oral once  dextrose 5% + sodium chloride 0.45% with potassium chloride 20 mEq/L 1000 milliLiter(s) (100 mL/Hr) IV Continuous <Continuous>  dextrose 5%. 1000 milliLiter(s) (50 mL/Hr) IV Continuous <Continuous>  dextrose 5%. 1000 milliLiter(s) (100 mL/Hr) IV Continuous <Continuous>  dextrose 50% Injectable 25 Gram(s) IV Push once  dextrose 50% Injectable 12.5 Gram(s) IV Push once  dextrose 50% Injectable 25 Gram(s) IV Push once  gabapentin 300 milliGRAM(s) Oral two times a day  glucagon  Injectable 1 milliGRAM(s) IntraMuscular once  influenza   Vaccine 0.5 milliLiter(s) IntraMuscular once  insulin glargine Injectable (LANTUS) 10 Unit(s) SubCutaneous at bedtime  insulin lispro (ADMELOG) corrective regimen sliding scale   SubCutaneous at bedtime  insulin lispro (ADMELOG) corrective regimen sliding scale   SubCutaneous three times a day before meals  pantoprazole  Injectable 40 milliGRAM(s) IV Push daily  sodium chloride 0.9% lock flush 3 milliLiter(s) IV Push every 8 hours    MEDICATIONS  (PRN):  HYDROmorphone  Injectable 0.5 milliGRAM(s) IV Push every 4 hours PRN Severe Pain (7 - 10)  HYDROmorphone  Injectable 0.25 milliGRAM(s) IV Push every 4 hours PRN Moderate Pain (4 - 6)  naloxone Injectable 0.1 milliGRAM(s) IV Push every 3 minutes PRN For ANY of the following changes in patient status:  A. RR LESS THAN 10 breaths per minute, B. Oxygen saturation LESS THAN 90%, C. Sedation score of 6  ondansetron Injectable 4 milliGRAM(s) IV Push every 6 hours PRN Nausea      LABS:                        10.2   8.89  )-----------( 106      ( 26 Jan 2022 20:17 )             31.2     01-26    137  |  104  |  5<L>  ----------------------------<  152<H>  4.4   |  25  |  0.59    Ca    8.9      26 Jan 2022 20:17  Phos  2.9     01-26  Mg     1.90     01-26    TPro  5.8<L>  /  Alb  3.1<L>  /  TBili  1.6<H>  /  DBili  x   /  AST  50<H>  /  ALT  70<H>  /  AlkPhos  86  01-26    PT/INR - ( 26 Jan 2022 20:17 )   PT: 13.7 sec;   INR: 1.20 ratio         PTT - ( 26 Jan 2022 20:17 )  PTT:31.0 sec      A: 57 yr F hours post IR abdominopelvic angiogram and left inferior epigastric artery embolization  Plan  - NPO  - repeat cbc in 6 hours  - f/u vitals  - pain control prn  [id number] D team
IR Follow up note    Patient with Hb drop on labs this morning. Given persistent bleeding off anticoagulation, can plan for angiogram and possible embolization today.   - maintain NPO status  - would need IR procedure order placed under approving attending, Dr. Bowman  - will need stat COVID test   - continue to hold anticoagulation
POST-OPERATIVE NOTE    Subjective:  Patient is s/p Ventral hernia repair with mesh . Lethargic at bedside.     Vital Signs Last 24 Hrs  T(C): 36.4 (21 Jan 2022 13:15), Max: 37.2 (21 Jan 2022 06:31)  T(F): 97.5 (21 Jan 2022 13:15), Max: 99 (21 Jan 2022 06:31)  HR: 114 (21 Jan 2022 18:30) (75 - 114)  BP: 129/65 (21 Jan 2022 18:30) (86/46 - 129/65)  BP(mean): 80 (21 Jan 2022 18:30) (54 - 80)  RR: 20 (21 Jan 2022 18:30) (11 - 24)  SpO2: 97% (21 Jan 2022 18:30) (93% - 100%)  I&O's Detail    21 Jan 2022 07:01  -  21 Jan 2022 18:53  --------------------------------------------------------  IN:    IV PiggyBack: 500 mL    Lactated Ringers: 450 mL    Lactated Ringers Bolus: 1000 mL  Total IN: 1950 mL    OUT:    Bulb (mL): 125 mL    Bulb (mL): 80 mL    Indwelling Catheter - Urethral (mL): 450 mL    Nasogastric/Oral tube (mL): 0 mL  Total OUT: 655 mL    Total NET: 1295 mL        enoxaparin Injectable 40    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 Midline incision with two drains placed.   Extremities: WWP      LABS:                        9.8    14.49 )-----------( 187      ( 21 Jan 2022 16:46 )             30.7     01-21    136  |  101  |  11  ----------------------------<  284<H>  5.3   |  16<L>  |  0.72    Ca    8.5      21 Jan 2022 17:48  Phos  5.6     01-21  Mg     1.40     01-21        CAPILLARY BLOOD GLUCOSE      POCT Blood Glucose.: 283 mg/dL (21 Jan 2022 18:24)      Radiology and Additional Studies:    Assessment:  The patient is a 57y Female who is now several hours post-op from a ventral hernia repair with mesh     Plan:  - SICU called to evaluate   - Pain control as needed  - OOB and ambulating as tolerated  - F/u AM labs

## 2022-01-26 NOTE — CHART NOTE - NSCHARTNOTESELECT_GEN_ALL_CORE
IR follow up note/Event Note
Post Op Check/Event Note
Event Note
Pre IR Note
abdominal examination
post-procedure check

## 2022-01-26 NOTE — PROGRESS NOTE ADULT - ASSESSMENT
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 Admitted to SICU for concern of abdominal compartment syndrome. Patient now on surgical floor since 1/23. Post operative course complicated by rectus muscle hematoma with concerns for active bleeding. Patient now s/p 1u PRBC (1/25) with adequate response. Patient is currently hemodynamically stable though still requiring transfusions given this morning's drop in hemoglobin.    - H/H 6.5/20.3  - Getting 1u PRBC  - F/u post transfusion CBC  - continue abdominal binder  - continue monitoring JOURDAN drains  - pain control prn  - will continue to follow      Plastic Surgery (pg MANINDER: [id number], NS: [phone])

## 2022-01-27 LAB
ALBUMIN SERPL ELPH-MCNC: 3.2 G/DL — LOW (ref 3.3–5)
ALP SERPL-CCNC: 111 U/L — SIGNIFICANT CHANGE UP (ref 40–120)
ALT FLD-CCNC: 53 U/L — HIGH (ref 4–33)
ANION GAP SERPL CALC-SCNC: 6 MMOL/L — LOW (ref 7–14)
APTT BLD: 28.9 SEC — SIGNIFICANT CHANGE UP (ref 27–36.3)
AST SERPL-CCNC: 60 U/L — HIGH (ref 4–32)
BILIRUB SERPL-MCNC: 2.3 MG/DL — HIGH (ref 0.2–1.2)
BLD GP AB SCN SERPL QL: NEGATIVE — SIGNIFICANT CHANGE UP
BUN SERPL-MCNC: 5 MG/DL — LOW (ref 7–23)
CALCIUM SERPL-MCNC: 8.6 MG/DL — SIGNIFICANT CHANGE UP (ref 8.4–10.5)
CHLORIDE SERPL-SCNC: 103 MMOL/L — SIGNIFICANT CHANGE UP (ref 98–107)
CO2 SERPL-SCNC: 28 MMOL/L — SIGNIFICANT CHANGE UP (ref 22–31)
CREAT SERPL-MCNC: 0.59 MG/DL — SIGNIFICANT CHANGE UP (ref 0.5–1.3)
GLUCOSE BLDC GLUCOMTR-MCNC: 147 MG/DL — HIGH (ref 70–99)
GLUCOSE BLDC GLUCOMTR-MCNC: 153 MG/DL — HIGH (ref 70–99)
GLUCOSE BLDC GLUCOMTR-MCNC: 176 MG/DL — HIGH (ref 70–99)
GLUCOSE BLDC GLUCOMTR-MCNC: 197 MG/DL — HIGH (ref 70–99)
GLUCOSE SERPL-MCNC: 188 MG/DL — HIGH (ref 70–99)
HCT VFR BLD CALC: 30.1 % — LOW (ref 34.5–45)
HCT VFR BLD CALC: 31.2 % — LOW (ref 34.5–45)
HGB BLD-MCNC: 10.2 G/DL — LOW (ref 11.5–15.5)
HGB BLD-MCNC: 10.5 G/DL — LOW (ref 11.5–15.5)
INR BLD: 1.28 RATIO — HIGH (ref 0.88–1.16)
MAGNESIUM SERPL-MCNC: 1.8 MG/DL — SIGNIFICANT CHANGE UP (ref 1.6–2.6)
MCHC RBC-ENTMCNC: 26.1 PG — LOW (ref 27–34)
MCHC RBC-ENTMCNC: 26.3 PG — LOW (ref 27–34)
MCHC RBC-ENTMCNC: 33.7 GM/DL — SIGNIFICANT CHANGE UP (ref 32–36)
MCHC RBC-ENTMCNC: 33.9 GM/DL — SIGNIFICANT CHANGE UP (ref 32–36)
MCV RBC AUTO: 77.6 FL — LOW (ref 80–100)
MCV RBC AUTO: 77.6 FL — LOW (ref 80–100)
NRBC # BLD: 0 /100 WBCS — SIGNIFICANT CHANGE UP
NRBC # BLD: 0 /100 WBCS — SIGNIFICANT CHANGE UP
NRBC # FLD: 0.05 K/UL — HIGH
NRBC # FLD: 0.07 K/UL — HIGH
PHOSPHATE SERPL-MCNC: 2.8 MG/DL — SIGNIFICANT CHANGE UP (ref 2.5–4.5)
PLATELET # BLD AUTO: 107 K/UL — LOW (ref 150–400)
PLATELET # BLD AUTO: 115 K/UL — LOW (ref 150–400)
POTASSIUM SERPL-MCNC: 4.8 MMOL/L — SIGNIFICANT CHANGE UP (ref 3.5–5.3)
POTASSIUM SERPL-SCNC: 4.8 MMOL/L — SIGNIFICANT CHANGE UP (ref 3.5–5.3)
PROT SERPL-MCNC: 6.1 G/DL — SIGNIFICANT CHANGE UP (ref 6–8.3)
PROTHROM AB SERPL-ACNC: 14.4 SEC — HIGH (ref 10.6–13.6)
RBC # BLD: 3.88 M/UL — SIGNIFICANT CHANGE UP (ref 3.8–5.2)
RBC # BLD: 4.02 M/UL — SIGNIFICANT CHANGE UP (ref 3.8–5.2)
RBC # FLD: 16.9 % — HIGH (ref 10.3–14.5)
RBC # FLD: 17.9 % — HIGH (ref 10.3–14.5)
RH IG SCN BLD-IMP: POSITIVE — SIGNIFICANT CHANGE UP
SODIUM SERPL-SCNC: 137 MMOL/L — SIGNIFICANT CHANGE UP (ref 135–145)
WBC # BLD: 8.78 K/UL — SIGNIFICANT CHANGE UP (ref 3.8–10.5)
WBC # BLD: 9.9 K/UL — SIGNIFICANT CHANGE UP (ref 3.8–10.5)
WBC # FLD AUTO: 8.78 K/UL — SIGNIFICANT CHANGE UP (ref 3.8–10.5)
WBC # FLD AUTO: 9.9 K/UL — SIGNIFICANT CHANGE UP (ref 3.8–10.5)

## 2022-01-27 RX ORDER — ACETAMINOPHEN 500 MG
650 TABLET ORAL ONCE
Refills: 0 | Status: COMPLETED | OUTPATIENT
Start: 2022-01-27 | End: 2022-01-27

## 2022-01-27 RX ADMIN — Medication 1: at 12:59

## 2022-01-27 RX ADMIN — SODIUM CHLORIDE 3 MILLILITER(S): 9 INJECTION INTRAMUSCULAR; INTRAVENOUS; SUBCUTANEOUS at 17:57

## 2022-01-27 RX ADMIN — HYDROMORPHONE HYDROCHLORIDE 0.5 MILLIGRAM(S): 2 INJECTION INTRAMUSCULAR; INTRAVENOUS; SUBCUTANEOUS at 05:39

## 2022-01-27 RX ADMIN — Medication 650 MILLIGRAM(S): at 08:46

## 2022-01-27 RX ADMIN — GABAPENTIN 300 MILLIGRAM(S): 400 CAPSULE ORAL at 18:02

## 2022-01-27 RX ADMIN — Medication 1: at 18:01

## 2022-01-27 RX ADMIN — SODIUM CHLORIDE 3 MILLILITER(S): 9 INJECTION INTRAMUSCULAR; INTRAVENOUS; SUBCUTANEOUS at 07:11

## 2022-01-27 RX ADMIN — GABAPENTIN 300 MILLIGRAM(S): 400 CAPSULE ORAL at 05:39

## 2022-01-27 RX ADMIN — Medication 260 MILLIGRAM(S): at 18:01

## 2022-01-27 RX ADMIN — ATORVASTATIN CALCIUM 10 MILLIGRAM(S): 80 TABLET, FILM COATED ORAL at 20:51

## 2022-01-27 RX ADMIN — Medication 1: at 09:32

## 2022-01-27 RX ADMIN — INSULIN GLARGINE 10 UNIT(S): 100 INJECTION, SOLUTION SUBCUTANEOUS at 20:51

## 2022-01-27 RX ADMIN — PANTOPRAZOLE SODIUM 40 MILLIGRAM(S): 20 TABLET, DELAYED RELEASE ORAL at 12:53

## 2022-01-27 RX ADMIN — SODIUM CHLORIDE 3 MILLILITER(S): 9 INJECTION INTRAMUSCULAR; INTRAVENOUS; SUBCUTANEOUS at 20:48

## 2022-01-27 RX ADMIN — Medication 650 MILLIGRAM(S): at 13:00

## 2022-01-27 RX ADMIN — Medication 260 MILLIGRAM(S): at 12:54

## 2022-01-27 NOTE — PROGRESS NOTE ADULT - ASSESSMENT
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 Admitted to SICU for concern of abdominal compartment syndrome. Patient now on surgical floor since 1/23. Post operative course complicated by rectus muscle hematoma with concerns for active bleeding. Patient was requiring multiple PRBC transfusions (4 since admission, last on 1/26).  Patient now s/p  abdominopelvic angiogram and left inferior epigastric artery embolization on 1/26 with IR. She continues to be hemodynamically stable, with  reassuring and appropriate H/H 10.2/30.1      - Monitor abdominal exam  - continue abdominal binder  - continue monitoring JOURDAN drains  - PRS to remove abdominal dressing bw POD 5 - 7  - pain control prn  - will continue to follow      Plastic Surgery (pg MANINDER: [id number], NS: [phone])

## 2022-01-27 NOTE — PROGRESS NOTE ADULT - SUBJECTIVE AND OBJECTIVE BOX
-------------------------------------------------------------  Interventional Radiology Post-operative Check  -------------------------------------------------------------    Procedure: Abdominopelvic angiogram and left inferior epigastric artery embolization    Interval events: No events overnight. Hb responded appropriately to 2u pRBC given yesterday.     Vital Signs:    T(C): 36.7 (01-27-22 @ 08:50), Max: 36.9 (01-26-22 @ 20:58)  HR: 93 (01-27-22 @ 08:50) (83 - 94)  BP: 132/67 (01-27-22 @ 08:50) (100/51 - 137/66)  RR: 18 (01-27-22 @ 08:50) (17 - 18)  SpO2: 95% (01-27-22 @ 08:50) (92% - 95%)    Limited Physical Exam:    General: No acute distress  Chest: Non labored breathing  Abdomen: Non-distended  Skin: Right groin access site bandage in place, clean & dry.  Extremities: Bilateral pedal pulses in tact. No swelling, warm    Assessment/Plan: Status post embolization of left inferior epigastric artery pseudoaneurysm in the setting of ventral wall hernia repair hematoma. HD stable. Hb now stable and responding appropriately s/p 4u prbc since admission.  -- IR to follow [id number]

## 2022-01-27 NOTE — PROGRESS NOTE ADULT - SUBJECTIVE AND OBJECTIVE BOX
PLASTIC SURGERY PROGRESS NOTE      SUBJECTIVE/ROS:   No acute events overnight  Patient feels okay, still with abdominal pain.         OBJECTIVE:    Vital Signs Last 24 Hrs  T(C): 36.7 (27 Jan 2022 08:50), Max: 36.9 (26 Jan 2022 20:58)  T(F): 98 (27 Jan 2022 08:50), Max: 98.4 (26 Jan 2022 20:58)  HR: 93 (27 Jan 2022 08:50) (83 - 94)  BP: 132/67 (27 Jan 2022 08:50) (100/51 - 137/66)  BP(mean): --  RR: 18 (27 Jan 2022 08:50) (17 - 18)  SpO2: 95% (27 Jan 2022 08:50) (92% - 95%)    PHYSICAL EXAM:  Constitutional:  NAD  Neuro: AOx3  Respiratory: Non-labored breathing  Abdomen soft, mildly tender, mildly distended. JOURDAN ss  Extremities:   FROM      I&Os:  I&O's Detail    26 Jan 2022 07:01  -  27 Jan 2022 07:00  --------------------------------------------------------  IN:    dextrose 5% + sodium chloride 0.45% w/ Additives: 900 mL  Total IN: 900 mL    OUT:    Bulb (mL): 10 mL    Bulb (mL): 10 mL    Indwelling Catheter - Urethral (mL): 2050 mL    Voided (mL): 1400 mL  Total OUT: 3470 mL    Total NET: -2570 mL          LABS:                        10.2   8.78  )-----------( 107      ( 27 Jan 2022 03:08 )             30.1     01-27    137  |  103  |  5<L>  ----------------------------<  188<H>  4.8   |  28  |  0.59    Ca    8.6      27 Jan 2022 03:08  Phos  2.8     01-27  Mg     1.80     01-27    TPro  6.1  /  Alb  3.2<L>  /  TBili  2.3<H>  /  DBili  x   /  AST  60<H>  /  ALT  53<H>  /  AlkPhos  111  01-27    PT/INR - ( 27 Jan 2022 04:24 )   PT: 14.4 sec;   INR: 1.28 ratio         PTT - ( 27 Jan 2022 04:24 )  PTT:28.9 sec

## 2022-01-27 NOTE — PROGRESS NOTE ADULT - SUBJECTIVE AND OBJECTIVE BOX
Surgery Progress Note    INTERVAl/SUBJECTIVE: No acute event overnight.     Vital Signs Last 24 Hrs  T(C): 36.9 (26 Jan 2022 20:58), Max: 36.9 (26 Jan 2022 20:58)  T(F): 98.4 (26 Jan 2022 20:58), Max: 98.4 (26 Jan 2022 20:58)  HR: 94 (26 Jan 2022 20:58) (83 - 94)  BP: 137/66 (26 Jan 2022 20:58) (98/53 - 137/66)  BP(mean): --  RR: 17 (26 Jan 2022 20:58) (17 - 18)  SpO2: 93% (26 Jan 2022 20:58) (93% - 100%)    Physical Exam:  General:  Neuro:    CV:   Abdomen:     LABS:                        10.2   8.89  )-----------( 106      ( 26 Jan 2022 20:17 )             31.2     01-26    137  |  104  |  5<L>  ----------------------------<  152<H>  4.4   |  25  |  0.59    Ca    8.9      26 Jan 2022 20:17  Phos  2.9     01-26  Mg     1.90     01-26    TPro  5.8<L>  /  Alb  3.1<L>  /  TBili  1.6<H>  /  DBili  x   /  AST  50<H>  /  ALT  70<H>  /  AlkPhos  86  01-26    PT/INR - ( 26 Jan 2022 20:17 )   PT: 13.7 sec;   INR: 1.20 ratio         PTT - ( 26 Jan 2022 20:17 )  PTT:31.0 sec      INs and OUTs:    01-25-22 @ 07:01  -  01-26-22 @ 07:00  --------------------------------------------------------  IN: 1000 mL / OUT: 1519 mL / NET: -519 mL    01-26-22 @ 07:01  -  01-27-22 @ 01:52  --------------------------------------------------------  IN: 0 mL / OUT: 2200 mL / NET: -2200 mL     Surgery Progress Note    INTERVAl/SUBJECTIVE: Status post embolization of left inferior epigastric artery pseudoaneurysm overnight with stable crits, this am patient complaining of abdominal pain    Vital Signs Last 24 Hrs  T(C): 36.9 (26 Jan 2022 20:58), Max: 36.9 (26 Jan 2022 20:58)  T(F): 98.4 (26 Jan 2022 20:58), Max: 98.4 (26 Jan 2022 20:58)  HR: 94 (26 Jan 2022 20:58) (83 - 94)  BP: 137/66 (26 Jan 2022 20:58) (98/53 - 137/66)  BP(mean): --  RR: 17 (26 Jan 2022 20:58) (17 - 18)  SpO2: 93% (26 Jan 2022 20:58) (93% - 100%)    Physical Exam:  General: awake and alert NAD  Abdomen: tender on left side, les tender on right lucretia drains with mini sanguineous output, incision c/d/i    LABS:                        10.2   8.89  )-----------( 106      ( 26 Jan 2022 20:17 )             31.2     01-26    137  |  104  |  5<L>  ----------------------------<  152<H>  4.4   |  25  |  0.59    Ca    8.9      26 Jan 2022 20:17  Phos  2.9     01-26  Mg     1.90     01-26    TPro  5.8<L>  /  Alb  3.1<L>  /  TBili  1.6<H>  /  DBili  x   /  AST  50<H>  /  ALT  70<H>  /  AlkPhos  86  01-26    PT/INR - ( 26 Jan 2022 20:17 )   PT: 13.7 sec;   INR: 1.20 ratio         PTT - ( 26 Jan 2022 20:17 )  PTT:31.0 sec      INs and OUTs:    01-25-22 @ 07:01  -  01-26-22 @ 07:00  --------------------------------------------------------  IN: 1000 mL / OUT: 1519 mL / NET: -519 mL    01-26-22 @ 07:01  -  01-27-22 @ 01:52  --------------------------------------------------------  IN: 0 mL / OUT: 2200 mL / NET: -2200 mL

## 2022-01-27 NOTE — PROGRESS NOTE ADULT - ASSESSMENT
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  Patient is now S/P ventral hernia repair with mesh on 1/21/22.      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  Patient is now S/P ventral hernia repair with mesh on 1/21/22 complicated by left inferior epigastric artery pseudoaneurysm Status post embolization 1/26      PLAN:  - Regular diet  - Pain control  - SCDS  - Continue current medications  - f.u CBC at 12

## 2022-01-28 ENCOUNTER — TRANSCRIPTION ENCOUNTER (OUTPATIENT)
Age: 57
End: 2022-01-28

## 2022-01-28 LAB
ALBUMIN SERPL ELPH-MCNC: 3.5 G/DL — SIGNIFICANT CHANGE UP (ref 3.3–5)
ALP SERPL-CCNC: 159 U/L — HIGH (ref 40–120)
ALT FLD-CCNC: 45 U/L — HIGH (ref 4–33)
ANION GAP SERPL CALC-SCNC: 9 MMOL/L — SIGNIFICANT CHANGE UP (ref 7–14)
AST SERPL-CCNC: 54 U/L — HIGH (ref 4–32)
BILIRUB DIRECT SERPL-MCNC: 0.5 MG/DL — HIGH (ref 0–0.3)
BILIRUB SERPL-MCNC: 2.5 MG/DL — HIGH (ref 0.2–1.2)
BUN SERPL-MCNC: 6 MG/DL — LOW (ref 7–23)
CALCIUM SERPL-MCNC: 8.7 MG/DL — SIGNIFICANT CHANGE UP (ref 8.4–10.5)
CHLORIDE SERPL-SCNC: 103 MMOL/L — SIGNIFICANT CHANGE UP (ref 98–107)
CO2 SERPL-SCNC: 25 MMOL/L — SIGNIFICANT CHANGE UP (ref 22–31)
CREAT SERPL-MCNC: 0.61 MG/DL — SIGNIFICANT CHANGE UP (ref 0.5–1.3)
GLUCOSE BLDC GLUCOMTR-MCNC: 130 MG/DL — HIGH (ref 70–99)
GLUCOSE BLDC GLUCOMTR-MCNC: 165 MG/DL — HIGH (ref 70–99)
GLUCOSE BLDC GLUCOMTR-MCNC: 169 MG/DL — HIGH (ref 70–99)
GLUCOSE BLDC GLUCOMTR-MCNC: 204 MG/DL — HIGH (ref 70–99)
GLUCOSE SERPL-MCNC: 158 MG/DL — HIGH (ref 70–99)
HCT VFR BLD CALC: 29.4 % — LOW (ref 34.5–45)
HGB BLD-MCNC: 9.9 G/DL — LOW (ref 11.5–15.5)
MAGNESIUM SERPL-MCNC: 1.8 MG/DL — SIGNIFICANT CHANGE UP (ref 1.6–2.6)
MCHC RBC-ENTMCNC: 26.5 PG — LOW (ref 27–34)
MCHC RBC-ENTMCNC: 33.7 GM/DL — SIGNIFICANT CHANGE UP (ref 32–36)
MCV RBC AUTO: 78.6 FL — LOW (ref 80–100)
NRBC # BLD: 0 /100 WBCS — SIGNIFICANT CHANGE UP
NRBC # FLD: 0.02 K/UL — HIGH
PHOSPHATE SERPL-MCNC: 2.9 MG/DL — SIGNIFICANT CHANGE UP (ref 2.5–4.5)
PLATELET # BLD AUTO: 123 K/UL — LOW (ref 150–400)
POTASSIUM SERPL-MCNC: 4.1 MMOL/L — SIGNIFICANT CHANGE UP (ref 3.5–5.3)
POTASSIUM SERPL-SCNC: 4.1 MMOL/L — SIGNIFICANT CHANGE UP (ref 3.5–5.3)
PROT SERPL-MCNC: 6.4 G/DL — SIGNIFICANT CHANGE UP (ref 6–8.3)
RBC # BLD: 3.74 M/UL — LOW (ref 3.8–5.2)
RBC # FLD: 18.5 % — HIGH (ref 10.3–14.5)
SARS-COV-2 RNA SPEC QL NAA+PROBE: SIGNIFICANT CHANGE UP
SODIUM SERPL-SCNC: 137 MMOL/L — SIGNIFICANT CHANGE UP (ref 135–145)
WBC # BLD: 7.88 K/UL — SIGNIFICANT CHANGE UP (ref 3.8–10.5)
WBC # FLD AUTO: 7.88 K/UL — SIGNIFICANT CHANGE UP (ref 3.8–10.5)

## 2022-01-28 RX ORDER — PANTOPRAZOLE SODIUM 20 MG/1
40 TABLET, DELAYED RELEASE ORAL
Refills: 0 | Status: DISCONTINUED | OUTPATIENT
Start: 2022-01-28 | End: 2022-01-31

## 2022-01-28 RX ORDER — OXYCODONE HYDROCHLORIDE 5 MG/1
10 TABLET ORAL EVERY 4 HOURS
Refills: 0 | Status: DISCONTINUED | OUTPATIENT
Start: 2022-01-28 | End: 2022-01-31

## 2022-01-28 RX ORDER — OXYCODONE HYDROCHLORIDE 5 MG/1
5 TABLET ORAL EVERY 4 HOURS
Refills: 0 | Status: DISCONTINUED | OUTPATIENT
Start: 2022-01-28 | End: 2022-01-31

## 2022-01-28 RX ORDER — ACETAMINOPHEN 500 MG
975 TABLET ORAL EVERY 6 HOURS
Refills: 0 | Status: DISCONTINUED | OUTPATIENT
Start: 2022-01-28 | End: 2022-01-31

## 2022-01-28 RX ORDER — ENOXAPARIN SODIUM 100 MG/ML
40 INJECTION SUBCUTANEOUS EVERY 24 HOURS
Refills: 0 | Status: DISCONTINUED | OUTPATIENT
Start: 2022-01-28 | End: 2022-01-31

## 2022-01-28 RX ADMIN — Medication 975 MILLIGRAM(S): at 16:38

## 2022-01-28 RX ADMIN — Medication 975 MILLIGRAM(S): at 16:39

## 2022-01-28 RX ADMIN — SODIUM CHLORIDE 3 MILLILITER(S): 9 INJECTION INTRAMUSCULAR; INTRAVENOUS; SUBCUTANEOUS at 16:34

## 2022-01-28 RX ADMIN — PANTOPRAZOLE SODIUM 40 MILLIGRAM(S): 20 TABLET, DELAYED RELEASE ORAL at 21:27

## 2022-01-28 RX ADMIN — SODIUM CHLORIDE 3 MILLILITER(S): 9 INJECTION INTRAMUSCULAR; INTRAVENOUS; SUBCUTANEOUS at 21:50

## 2022-01-28 RX ADMIN — SODIUM CHLORIDE 3 MILLILITER(S): 9 INJECTION INTRAMUSCULAR; INTRAVENOUS; SUBCUTANEOUS at 08:26

## 2022-01-28 RX ADMIN — ATORVASTATIN CALCIUM 10 MILLIGRAM(S): 80 TABLET, FILM COATED ORAL at 21:24

## 2022-01-28 RX ADMIN — Medication 1: at 08:32

## 2022-01-28 RX ADMIN — GABAPENTIN 300 MILLIGRAM(S): 400 CAPSULE ORAL at 05:59

## 2022-01-28 RX ADMIN — Medication 975 MILLIGRAM(S): at 05:58

## 2022-01-28 RX ADMIN — Medication 1: at 17:41

## 2022-01-28 RX ADMIN — GABAPENTIN 300 MILLIGRAM(S): 400 CAPSULE ORAL at 16:38

## 2022-01-28 RX ADMIN — INSULIN GLARGINE 10 UNIT(S): 100 INJECTION, SOLUTION SUBCUTANEOUS at 22:12

## 2022-01-28 RX ADMIN — HYDROMORPHONE HYDROCHLORIDE 0.5 MILLIGRAM(S): 2 INJECTION INTRAMUSCULAR; INTRAVENOUS; SUBCUTANEOUS at 03:25

## 2022-01-28 RX ADMIN — ENOXAPARIN SODIUM 40 MILLIGRAM(S): 100 INJECTION SUBCUTANEOUS at 21:24

## 2022-01-28 NOTE — DISCHARGE NOTE PROVIDER - HOSPITAL COURSE
56 year old female with a PMHx of GERD, DM2, ampullary cancer (S/P Whipple procedure 1/11/19), repair of gastroduodenal artery bleed (1/18/19) and drainage of abscess / repair of abdominal wall (1/21/19) who presented to [facility] for scheduled surgery for her abdominal hernia which has increased in size. Patient is now s/p ventral hernia repair with mesh on 1/21/22  Post op course complicated by left inferior epigastric artery pseudoaneurysm Status post embolization 1/26 now with stable h/h doing well  Patient tolerated the procedure well, without complication. Patient remained hemodynamically stable in the PACU and transferred to the surgical floor. Diet was restarted and advanced as tolerated. Pain control was transitioned from IV to PO pain meds. At this time, patient is currently ambulating with PT, voiding, tolerating a regular diet. Pain well controlled on PO pain meds. Patient has been deemed stable for discharge home with follow up as an outpatient.

## 2022-01-28 NOTE — DISCHARGE NOTE PROVIDER - CARE PROVIDER_API CALL
Dillon Bourne)  Complex General Surgical Oncology; Surgery; Surgical Oncology  [address] [place] [address]  Phone: [phone]  Fax: [phone]  Follow Up Time: 2 weeks

## 2022-01-28 NOTE — DISCHARGE NOTE NURSING/CASE MANAGEMENT/SOCIAL WORK - NSDCPEFALRISK_GEN_ALL_CORE
For information on Fall & Injury Prevention, visit: [email or web address] OR  [email or web address] OR  https://www.cdc.gov/steadi/patient.html

## 2022-01-28 NOTE — DISCHARGE NOTE PROVIDER - NSDCCPCAREPLAN_GEN_ALL_CORE_FT
PRINCIPAL DISCHARGE DIAGNOSIS  Diagnosis: Incisional hernia  Assessment and Plan of Treatment: S/P ventral hernia repair with mesh on 1/21/22 complicated by left inferior epigastric artery pseudoaneurysm Status post embolization 1/26   WOUND CARE:  Please keep incisions clean and dry. Please do not Scrub or rub incisions. DO NOT use lotion or powder on incisions.   BATHING: You may shower and/or sponge bathe. You may use warm soapy water in the shower and rinse, pat dry. NO baths no pools for 6 weeks.  ACTIVITY: No heavy lifting or straining. Otherwise, you may return to your usual level of physical activity.   Take Tylenol 500mg every 6 hours as needed for mild to moderate pain if your pain continues take oxycodone 5 mg. If you are taking narcotic pain (oxycodone) medication DO NOT drive a car, operate machinery or make important decisions.  DIET: Return to your usual diet.  NOTIFY YOUR SURGEON IF YOU HAVE: any bleeding that does not stop, any pus draining from your wound(s), any fever (over 100.4 F) persistent nausea/vomiting, or if your pain is not controlled on your discharge pain medications, unable to urinate.  Please follow up with your primary care physician in one week regarding your hospitalization, bring copies of your discharge paperwork.  Please follow up with your surgeon, Dr. Bourne  Follow up in the palstic surgery clinic, please call [phone] for an appointment, keep abdominal binder on

## 2022-01-28 NOTE — PROGRESS NOTE ADULT - ASSESSMENT
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 Admitted to SICU for concern of abdominal compartment syndrome. Patient now on surgical floor since 1/23. Post operative course complicated by rectus muscle hematoma with concerns for active bleeding. Patient was requiring multiple PRBC transfusions (4 since admission, last on 1/26).  Patient now s/p  abdominopelvic angiogram and left inferior epigastric artery embolization on 1/26 with IR. She continues to be hemodynamically stable, patient looks well.      - Follow-up AM labs  - continue abdominal binder with ABDs  - PRS will discontinue drains later today  - Ok to discharge home from plastics perspective   - Follow-up in plastics clinic in 1 week, call to make appointment      Plastic Surgery (pg MANINDER: [id number], NS: [phone])

## 2022-01-28 NOTE — DISCHARGE NOTE NURSING/CASE MANAGEMENT/SOCIAL WORK - PATIENT PORTAL LINK FT
You can access the FollowMyHealth Patient Portal offered by [facility] by registering at the following website: [email or web address]. By joining [facility]’s FollowMyHealth portal, you will also be able to view your health information using other applications (apps) compatible with our system.

## 2022-01-28 NOTE — DISCHARGE NOTE PROVIDER - NSDCMRMEDTOKEN_GEN_ALL_CORE_FT
atorvastatin 10 mg oral tablet: 1 tab(s) orally once a day  Colace 2-in-1 50 mg-8.6 mg oral tablet: 3 tab(s) orally once a day (at bedtime)  gabapentin 300 mg oral capsule: 1 cap(s) orally 2 times a day  metFORMIN 500 mg oral tablet: 1 tab(s) orally 2 times a day  montelukast 10 mg oral tablet: 1 tab(s) orally once a day (at bedtime)  Multiple Vitamins oral capsule: 1 cap(s) orally once a day. Last dose 1/13/22.  omeprazole 40 mg oral delayed release capsule: 1 cap(s) orally once a day  Vitamin D3 125 mcg (5000 intl units) oral capsule: 1 cap(s) orally once a day   acetaminophen 325 mg oral tablet: 3 tab(s) orally every 6 hours  atorvastatin 10 mg oral tablet: 1 tab(s) orally once a day  Colace 2-in-1 50 mg-8.6 mg oral tablet: 3 tab(s) orally once a day (at bedtime)  gabapentin 300 mg oral capsule: 1 cap(s) orally 2 times a day  metFORMIN 500 mg oral tablet: 1 tab(s) orally 2 times a day  montelukast 10 mg oral tablet: 1 tab(s) orally once a day (at bedtime)  Multiple Vitamins oral capsule: 1 cap(s) orally once a day. Last dose 1/13/22.  omeprazole 40 mg oral delayed release capsule: 1 cap(s) orally once a day  Vitamin D3 125 mcg (5000 intl units) oral capsule: 1 cap(s) orally once a day

## 2022-01-28 NOTE — DISCHARGE NOTE PROVIDER - CARE PROVIDERS DIRECT ADDRESSES
[email or web address]

## 2022-01-28 NOTE — DISCHARGE NOTE NURSING/CASE MANAGEMENT/SOCIAL WORK - NSSCNAMETXT_GEN_ALL_CORE
[facility] at [place] [phone] initial visit will be day after discharge home. A nurse will call prior to the home visit.

## 2022-01-28 NOTE — PROGRESS NOTE ADULT - ASSESSMENT
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  Patient is now S/P ventral hernia repair with mesh on 1/21/22 complicated by left inferior epigastric artery pseudoaneurysm Status post embolization 1/26    PLAN:  - Hb stable, continue to monitor  - Pain control  - Swanson fell out yesterday, c/q q4h vitals and I&Os  - Switch pain meds to PO today  - Continue current medications    D Team Surgery  [id number] Patient is a 56 year old female with a PMHx of GERD, DM2, ampullary cancer (S/P Whipple procedure 1/11/19), repair of gastroduodenal artery bleed (1/18/19) and drainage of abscess / repair of abdominal wall (1/21/19) who presented to pre-surgical testing with diagnosis of abdominal hernia which has increased in size and is causing discomfort.  Patient is now S/P ventral hernia repair with mesh on 1/21/22 complicated by left inferior epigastric artery pseudoaneurysm Status post embolization 1/26 now with stable h/h doing well, tolerating regular diet    PLAN:  - Hb stable, continue to monitor if stable start DVT ppx  - Pain control  - Swanson fell out yesterday, c/q q4h vitals and I&Os  - Switch pain meds to PO today  - Continue current medications    D Team Surgery  [id number]

## 2022-01-28 NOTE — PROGRESS NOTE ADULT - SUBJECTIVE AND OBJECTIVE BOX
SURGERY PROGRESS NOTE      SUBJECTIVE/ROS:   No acute events overnight  Patient feels generally well  In good spirits this am         OBJECTIVE:    Vital Signs Last 24 Hrs  T(C): 37.1 (28 Jan 2022 04:05), Max: 37.1 (28 Jan 2022 00:20)  T(F): 98.8 (28 Jan 2022 04:05), Max: 98.8 (28 Jan 2022 04:05)  HR: 95 (28 Jan 2022 04:05) (83 - 95)  BP: 131/64 (28 Jan 2022 04:05) (110/57 - 132/67)  BP(mean): --  RR: 18 (28 Jan 2022 04:05) (18 - 18)  SpO2: 95% (28 Jan 2022 04:05) (95% - 98%)    PHYSICAL EXAM:  Constitutional:  NAD, smiling  Neuro: AOx3  Respiratory: Non-labored breathing  Gastrointestinal: Abdomen soft, nontender, mildly distended. Aquacel removed. skin soft and plat, suture line intact  Extremities:  FROM      I&Os:  I&O's Detail    27 Jan 2022 07:01  -  28 Jan 2022 07:00  --------------------------------------------------------  IN:  Total IN: 0 mL    OUT:    Bulb (mL): 0 mL    Bulb (mL): 0 mL    Voided (mL): 2400 mL  Total OUT: 2400 mL    Total NET: -2400 mL          LABS:                        10.5   9.90  )-----------( 115      ( 27 Jan 2022 13:34 )             31.2     01-27    137  |  103  |  5<L>  ----------------------------<  188<H>  4.8   |  28  |  0.59    Ca    8.6      27 Jan 2022 03:08  Phos  2.8     01-27  Mg     1.80     01-27    TPro  6.1  /  Alb  3.2<L>  /  TBili  2.3<H>  /  DBili  x   /  AST  60<H>  /  ALT  53<H>  /  AlkPhos  111  01-27    PT/INR - ( 27 Jan 2022 04:24 )   PT: 14.4 sec;   INR: 1.28 ratio         PTT - ( 27 Jan 2022 04:24 )  PTT:28.9 sec

## 2022-01-28 NOTE — PROGRESS NOTE ADULT - SUBJECTIVE AND OBJECTIVE BOX
24h Events:  No acute events overnight.    Subjective:   Patient seen at bedside this AM.     Objective:  Vital Signs  T(C): 37.1 (01-28 @ 04:05), Max: 37.1 (01-28 @ 00:20)  HR: 95 (01-28 @ 04:05) (83 - 95)  BP: 131/64 (01-28 @ 04:05) (110/57 - 136/65)  RR: 18 (01-28 @ 04:05) (18 - 18)  SpO2: 95% (01-28 @ 04:05) (92% - 98%)  01-26-22 @ 07:01  -  01-27-22 @ 07:00  --------------------------------------------------------  IN:  Total IN: 0 mL    OUT:    Bulb (mL): 10 mL    Bulb (mL): 10 mL    Indwelling Catheter - Urethral (mL): 2050 mL    Voided (mL): 1400 mL  Total OUT: 3470 mL    Total NET: -3470 mL      01-27-22 @ 07:01  -  01-28-22 @ 04:26  --------------------------------------------------------  IN:  Total IN: 0 mL    OUT:    Bulb (mL): 0 mL    Bulb (mL): 0 mL    Voided (mL): 2400 mL  Total OUT: 2400 mL    Total NET: -2400 mL          Physical Exam:  GEN: resting in bed comfortably in NAD  RESP: no increased WOB  ABD: tender on left side, les tender on right lucretia drains with mini sanguineous output, incision c/d/i  EXTR: warm, well-perfused, no edema  NEURO: awake, alert    Labs:                        10.5   9.90  )-----------( 115      ( 27 Jan 2022 13:34 )             31.2   01-27    137  |  103  |  5<L>  ----------------------------<  188<H>  4.8   |  28  |  0.59    Ca    8.6      27 Jan 2022 03:08  Phos  2.8     01-27  Mg     1.80     01-27    TPro  6.1  /  Alb  3.2<L>  /  TBili  2.3<H>  /  DBili  x   /  AST  60<H>  /  ALT  53<H>  /  AlkPhos  111  01-27    CAPILLARY BLOOD GLUCOSE      POCT Blood Glucose.: 147 mg/dL (27 Jan 2022 21:51)  POCT Blood Glucose.: 153 mg/dL (27 Jan 2022 17:32)  POCT Blood Glucose.: 197 mg/dL (27 Jan 2022 12:34)  POCT Blood Glucose.: 176 mg/dL (27 Jan 2022 09:22)      Imaging:     24h Events:  No acute events overnight.    Subjective:   Patient seen at bedside this AM. She states she is better today, she states she is voiding and having bowel movements and tolerating a regular diet    Objective:  Vital Signs  T(C): 37.1 (01-28 @ 04:05), Max: 37.1 (01-28 @ 00:20)  HR: 95 (01-28 @ 04:05) (83 - 95)  BP: 131/64 (01-28 @ 04:05) (110/57 - 136/65)  RR: 18 (01-28 @ 04:05) (18 - 18)  SpO2: 95% (01-28 @ 04:05) (92% - 98%)  01-26-22 @ 07:01  -  01-27-22 @ 07:00  --------------------------------------------------------  IN:  Total IN: 0 mL    OUT:    Bulb (mL): 10 mL    Bulb (mL): 10 mL    Indwelling Catheter - Urethral (mL): 2050 mL    Voided (mL): 1400 mL  Total OUT: 3470 mL    Total NET: -3470 mL      01-27-22 @ 07:01  -  01-28-22 @ 04:26  --------------------------------------------------------  IN:  Total IN: 0 mL    OUT:    Bulb (mL): 0 mL    Bulb (mL): 0 mL    Voided (mL): 2400 mL  Total OUT: 2400 mL    Total NET: -2400 mL          Physical Exam:  GEN: resting in bed comfortably in NAD, appears much more comfortable than yesterday and prior days  RESP: no increased WOB  ABD: overall tenderness improved, lucretia drains with mini sanguineous output, incision c/d/i  EXTR: warm, well-perfused, no edema  NEURO: awake, alert    Labs:                        10.5   9.90  )-----------( 115      ( 27 Jan 2022 13:34 )             31.2   01-27    137  |  103  |  5<L>  ----------------------------<  188<H>  4.8   |  28  |  0.59    Ca    8.6      27 Jan 2022 03:08  Phos  2.8     01-27  Mg     1.80     01-27    TPro  6.1  /  Alb  3.2<L>  /  TBili  2.3<H>  /  DBili  x   /  AST  60<H>  /  ALT  53<H>  /  AlkPhos  111  01-27    CAPILLARY BLOOD GLUCOSE      POCT Blood Glucose.: 147 mg/dL (27 Jan 2022 21:51)  POCT Blood Glucose.: 153 mg/dL (27 Jan 2022 17:32)  POCT Blood Glucose.: 197 mg/dL (27 Jan 2022 12:34)  POCT Blood Glucose.: 176 mg/dL (27 Jan 2022 09:22)      Imaging:

## 2022-01-29 LAB
ALBUMIN SERPL ELPH-MCNC: 3.4 G/DL — SIGNIFICANT CHANGE UP (ref 3.3–5)
ALP SERPL-CCNC: 154 U/L — HIGH (ref 40–120)
ALT FLD-CCNC: 38 U/L — HIGH (ref 4–33)
ANION GAP SERPL CALC-SCNC: 9 MMOL/L — SIGNIFICANT CHANGE UP (ref 7–14)
AST SERPL-CCNC: 43 U/L — HIGH (ref 4–32)
BILIRUB SERPL-MCNC: 2.4 MG/DL — HIGH (ref 0.2–1.2)
BUN SERPL-MCNC: 6 MG/DL — LOW (ref 7–23)
CALCIUM SERPL-MCNC: 8.5 MG/DL — SIGNIFICANT CHANGE UP (ref 8.4–10.5)
CHLORIDE SERPL-SCNC: 106 MMOL/L — SIGNIFICANT CHANGE UP (ref 98–107)
CO2 SERPL-SCNC: 23 MMOL/L — SIGNIFICANT CHANGE UP (ref 22–31)
CREAT SERPL-MCNC: 0.58 MG/DL — SIGNIFICANT CHANGE UP (ref 0.5–1.3)
GLUCOSE BLDC GLUCOMTR-MCNC: 149 MG/DL — HIGH (ref 70–99)
GLUCOSE BLDC GLUCOMTR-MCNC: 159 MG/DL — HIGH (ref 70–99)
GLUCOSE BLDC GLUCOMTR-MCNC: 205 MG/DL — HIGH (ref 70–99)
GLUCOSE BLDC GLUCOMTR-MCNC: 206 MG/DL — HIGH (ref 70–99)
GLUCOSE SERPL-MCNC: 118 MG/DL — HIGH (ref 70–99)
HCT VFR BLD CALC: 28.7 % — LOW (ref 34.5–45)
HGB BLD-MCNC: 9.6 G/DL — LOW (ref 11.5–15.5)
MAGNESIUM SERPL-MCNC: 1.9 MG/DL — SIGNIFICANT CHANGE UP (ref 1.6–2.6)
MCHC RBC-ENTMCNC: 26.5 PG — LOW (ref 27–34)
MCHC RBC-ENTMCNC: 33.4 GM/DL — SIGNIFICANT CHANGE UP (ref 32–36)
MCV RBC AUTO: 79.3 FL — LOW (ref 80–100)
NRBC # BLD: 0 /100 WBCS — SIGNIFICANT CHANGE UP
NRBC # FLD: 0 K/UL — SIGNIFICANT CHANGE UP
PHOSPHATE SERPL-MCNC: 3.2 MG/DL — SIGNIFICANT CHANGE UP (ref 2.5–4.5)
PLATELET # BLD AUTO: 130 K/UL — LOW (ref 150–400)
POTASSIUM SERPL-MCNC: 3.6 MMOL/L — SIGNIFICANT CHANGE UP (ref 3.5–5.3)
POTASSIUM SERPL-SCNC: 3.6 MMOL/L — SIGNIFICANT CHANGE UP (ref 3.5–5.3)
PROT SERPL-MCNC: 6.4 G/DL — SIGNIFICANT CHANGE UP (ref 6–8.3)
RBC # BLD: 3.62 M/UL — LOW (ref 3.8–5.2)
RBC # FLD: 19.2 % — HIGH (ref 10.3–14.5)
SODIUM SERPL-SCNC: 138 MMOL/L — SIGNIFICANT CHANGE UP (ref 135–145)
WBC # BLD: 6.91 K/UL — SIGNIFICANT CHANGE UP (ref 3.8–10.5)
WBC # FLD AUTO: 6.91 K/UL — SIGNIFICANT CHANGE UP (ref 3.8–10.5)

## 2022-01-29 RX ORDER — MAGNESIUM SULFATE 500 MG/ML
1 VIAL (ML) INJECTION ONCE
Refills: 0 | Status: COMPLETED | OUTPATIENT
Start: 2022-01-29 | End: 2022-01-29

## 2022-01-29 RX ORDER — POTASSIUM CHLORIDE 20 MEQ
20 PACKET (EA) ORAL ONCE
Refills: 0 | Status: COMPLETED | OUTPATIENT
Start: 2022-01-29 | End: 2022-01-29

## 2022-01-29 RX ADMIN — ATORVASTATIN CALCIUM 10 MILLIGRAM(S): 80 TABLET, FILM COATED ORAL at 22:12

## 2022-01-29 RX ADMIN — Medication 975 MILLIGRAM(S): at 17:47

## 2022-01-29 RX ADMIN — Medication 100 GRAM(S): at 12:15

## 2022-01-29 RX ADMIN — SODIUM CHLORIDE 3 MILLILITER(S): 9 INJECTION INTRAMUSCULAR; INTRAVENOUS; SUBCUTANEOUS at 15:00

## 2022-01-29 RX ADMIN — INSULIN GLARGINE 10 UNIT(S): 100 INJECTION, SOLUTION SUBCUTANEOUS at 22:13

## 2022-01-29 RX ADMIN — Medication 20 MILLIEQUIVALENT(S): at 12:16

## 2022-01-29 RX ADMIN — Medication 2: at 13:51

## 2022-01-29 RX ADMIN — Medication 975 MILLIGRAM(S): at 23:41

## 2022-01-29 RX ADMIN — GABAPENTIN 300 MILLIGRAM(S): 400 CAPSULE ORAL at 06:19

## 2022-01-29 RX ADMIN — Medication 975 MILLIGRAM(S): at 06:18

## 2022-01-29 RX ADMIN — ENOXAPARIN SODIUM 40 MILLIGRAM(S): 100 INJECTION SUBCUTANEOUS at 22:11

## 2022-01-29 RX ADMIN — Medication 975 MILLIGRAM(S): at 01:00

## 2022-01-29 RX ADMIN — GABAPENTIN 300 MILLIGRAM(S): 400 CAPSULE ORAL at 17:47

## 2022-01-29 RX ADMIN — Medication 975 MILLIGRAM(S): at 00:15

## 2022-01-29 RX ADMIN — SODIUM CHLORIDE 3 MILLILITER(S): 9 INJECTION INTRAMUSCULAR; INTRAVENOUS; SUBCUTANEOUS at 22:00

## 2022-01-29 RX ADMIN — Medication 1: at 17:48

## 2022-01-29 RX ADMIN — SODIUM CHLORIDE 3 MILLILITER(S): 9 INJECTION INTRAMUSCULAR; INTRAVENOUS; SUBCUTANEOUS at 07:51

## 2022-01-29 RX ADMIN — Medication 975 MILLIGRAM(S): at 12:16

## 2022-01-29 NOTE — PROGRESS NOTE ADULT - SUBJECTIVE AND OBJECTIVE BOX
Surgery Progress Note    INTERVAl/SUBJECTIVE: No acute event overnight.     Vital Signs Last 24 Hrs  T(C): 37.3 (29 Jan 2022 00:46), Max: 37.3 (29 Jan 2022 00:46)  T(F): 99.1 (29 Jan 2022 00:46), Max: 99.1 (29 Jan 2022 00:46)  HR: 83 (29 Jan 2022 00:46) (77 - 95)  BP: 124/65 (29 Jan 2022 00:46) (118/61 - 134/74)  BP(mean): --  RR: 18 (29 Jan 2022 00:46) (18 - 18)  SpO2: 99% (29 Jan 2022 00:46) (95% - 99%)    Physical Exam:  General:  Neuro:    CV:   Abdomen:     LABS:                        9.9    7.88  )-----------( 123      ( 28 Jan 2022 07:25 )             29.4     01-28    137  |  103  |  6<L>  ----------------------------<  158<H>  4.1   |  25  |  0.61    Ca    8.7      28 Jan 2022 07:25  Phos  2.9     01-28  Mg     1.80     01-28    TPro  x   /  Alb  x   /  TBili  x   /  DBili  0.5<H>  /  AST  x   /  ALT  x   /  AlkPhos  x   01-28    PT/INR - ( 27 Jan 2022 04:24 )   PT: 14.4 sec;   INR: 1.28 ratio         PTT - ( 27 Jan 2022 04:24 )  PTT:28.9 sec      INs and OUTs:    01-27-22 @ 07:01  -  01-28-22 @ 07:00  --------------------------------------------------------  IN: 0 mL / OUT: 2400 mL / NET: -2400 mL    01-28-22 @ 07:01  -  01-29-22 @ 02:40  --------------------------------------------------------  IN: 180 mL / OUT: 950 mL / NET: -770 mL     Surgery Progress Note    INTERVAl/SUBJECTIVE: Patient was seen and examined at bedside. No acute event overnight. Had pain overnight (similar to the pain she had over the previous days) and her pain is better controlled now. Denies nausea or vomiting, but feels weak. Tolerating her diet, mostly eating eggs. Passing flatus with last bowel movement noted 2 days ago.    Vital Signs Last 24 Hrs  T(C): 37.3 (29 Jan 2022 00:46), Max: 37.3 (29 Jan 2022 00:46)  T(F): 99.1 (29 Jan 2022 00:46), Max: 99.1 (29 Jan 2022 00:46)  HR: 83 (29 Jan 2022 00:46) (77 - 95)  BP: 124/65 (29 Jan 2022 00:46) (118/61 - 134/74)  BP(mean): --  RR: 18 (29 Jan 2022 00:46) (18 - 18)  SpO2: 99% (29 Jan 2022 00:46) (95% - 99%)    Physical Exam:  General: NAD  Neuro: AOX3  Abdomen: Soft, appropriate incisional tenderness. Wound clean, dry intact.    LABS:                        9.9    7.88  )-----------( 123      ( 28 Jan 2022 07:25 )             29.4     01-28    137  |  103  |  6<L>  ----------------------------<  158<H>  4.1   |  25  |  0.61    Ca    8.7      28 Jan 2022 07:25  Phos  2.9     01-28  Mg     1.80     01-28    TPro  x   /  Alb  x   /  TBili  x   /  DBili  0.5<H>  /  AST  x   /  ALT  x   /  AlkPhos  x   01-28    PT/INR - ( 27 Jan 2022 04:24 )   PT: 14.4 sec;   INR: 1.28 ratio         PTT - ( 27 Jan 2022 04:24 )  PTT:28.9 sec      INs and OUTs:    01-27-22 @ 07:01  -  01-28-22 @ 07:00  --------------------------------------------------------  IN: 0 mL / OUT: 2400 mL / NET: -2400 mL    01-28-22 @ 07:01  -  01-29-22 @ 02:40  --------------------------------------------------------  IN: 180 mL / OUT: 950 mL / NET: -770 mL     Surgery Progress Note    INTERVAl/SUBJECTIVE: Patient was seen and examined at bedside. No acute event overnight. Had pain and was coughing overnight (similar to the pain she had over the previous days) and her pain is better controlled now. Denies nausea or vomiting, but feels weak. Tolerating her diet, mostly eating eggs. Passing flatus with last bowel movement noted 2 days ago.    Vital Signs Last 24 Hrs  T(C): 37.3 (29 Jan 2022 00:46), Max: 37.3 (29 Jan 2022 00:46)  T(F): 99.1 (29 Jan 2022 00:46), Max: 99.1 (29 Jan 2022 00:46)  HR: 83 (29 Jan 2022 00:46) (77 - 95)  BP: 124/65 (29 Jan 2022 00:46) (118/61 - 134/74)  BP(mean): --  RR: 18 (29 Jan 2022 00:46) (18 - 18)  SpO2: 99% (29 Jan 2022 00:46) (95% - 99%)    Physical Exam:  General: NAD  Neuro: AOX3  Abdomen: Soft, appropriate incisional tenderness. Wound clean, dry intact.    LABS:                        9.9    7.88  )-----------( 123      ( 28 Jan 2022 07:25 )             29.4     01-28    137  |  103  |  6<L>  ----------------------------<  158<H>  4.1   |  25  |  0.61    Ca    8.7      28 Jan 2022 07:25  Phos  2.9     01-28  Mg     1.80     01-28    TPro  x   /  Alb  x   /  TBili  x   /  DBili  0.5<H>  /  AST  x   /  ALT  x   /  AlkPhos  x   01-28    PT/INR - ( 27 Jan 2022 04:24 )   PT: 14.4 sec;   INR: 1.28 ratio         PTT - ( 27 Jan 2022 04:24 )  PTT:28.9 sec      INs and OUTs:    01-27-22 @ 07:01  -  01-28-22 @ 07:00  --------------------------------------------------------  IN: 0 mL / OUT: 2400 mL / NET: -2400 mL    01-28-22 @ 07:01  -  01-29-22 @ 02:40  --------------------------------------------------------  IN: 180 mL / OUT: 950 mL / NET: -770 mL

## 2022-01-29 NOTE — PROGRESS NOTE ADULT - ASSESSMENT
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 Admitted to SICU for concern of abdominal compartment syndrome. Patient now on surgical floor since 1/23. Post operative course complicated by rectus muscle hematoma with concerns for active bleeding. Patient was requiring multiple PRBC transfusions.  Patient now s/p abdominopelvic angiogram and left inferior epigastric artery embolization on 1/26 with IR. She continues to be hemodynamically stable, patient looks well.    - continue abdominal binder with ABDs  - Ok to discharge home from plastics perspective, storm permitting  - Follow-up in plastic surgery clinic in 1 week, call to make appointment      Plastic Surgery (pg MANINDER: [id number], NS: [phone])

## 2022-01-29 NOTE — PROGRESS NOTE ADULT - ASSESSMENT
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  Patient is now S/P ventral hernia repair with mesh on 1/21/22 complicated by left inferior epigastric artery pseudoaneurysm Status post embolization 1/26 now with stable h/h doing well, tolerating regular diet    PLAN:    - Monitor labs, replete PRN  - Pain control  - Monitor vitals and I&Os  - Continue current medications  - CM f/u for home PT setup

## 2022-01-29 NOTE — PROGRESS NOTE ADULT - SUBJECTIVE AND OBJECTIVE BOX
Plastic Surgery Progress Note    Subjective: seen on rounds, no issues    Objective:  Exam:   General: NAD  Neuro: Alert  Pulm: comfortable  Abd: soft, incisions intact, drains previously removed, mild ttp and bloating    Vital Signs Last 24 Hrs  T(C): 36.7 (29 Jan 2022 05:55), Max: 37.3 (29 Jan 2022 00:46)  T(F): 98 (29 Jan 2022 05:55), Max: 99.1 (29 Jan 2022 00:46)  HR: 75 (29 Jan 2022 05:55) (75 - 89)  BP: 104/58 (29 Jan 2022 05:55) (104/58 - 134/74)  BP(mean): --  RR: 18 (29 Jan 2022 05:55) (18 - 18)  SpO2: 98% (29 Jan 2022 05:55) (96% - 99%)    I&O's Detail    28 Jan 2022 07:01  -  29 Jan 2022 07:00  --------------------------------------------------------  IN:    Oral Fluid: 300 mL  Total IN: 300 mL    OUT:    Voided (mL): 1250 mL  Total OUT: 1250 mL    Total NET: -950 mL      MEDICATIONS  (STANDING):  acetaminophen     Tablet .. 975 milliGRAM(s) Oral every 6 hours  atorvastatin 10 milliGRAM(s) Oral at bedtime  enoxaparin Injectable 40 milliGRAM(s) SubCutaneous every 24 hours  gabapentin 300 milliGRAM(s) Oral two times a day  influenza   Vaccine 0.5 milliLiter(s) IntraMuscular once  insulin glargine Injectable (LANTUS) 10 Unit(s) SubCutaneous at bedtime  insulin lispro (ADMELOG) corrective regimen sliding scale   SubCutaneous three times a day before meals  insulin lispro (ADMELOG) corrective regimen sliding scale   SubCutaneous at bedtime  magnesium sulfate  IVPB 1 Gram(s) IV Intermittent once  pantoprazole    Tablet 40 milliGRAM(s) Oral before breakfast  potassium chloride    Tablet ER 20 milliEquivalent(s) Oral once  sodium chloride 0.9% lock flush 3 milliLiter(s) IV Push every 8 hours    MEDICATIONS  (PRN):  naloxone Injectable 0.1 milliGRAM(s) IV Push every 3 minutes PRN For ANY of the following changes in patient status:  A. RR LESS THAN 10 breaths per minute, B. Oxygen saturation LESS THAN 90%, C. Sedation score of 6  ondansetron Injectable 4 milliGRAM(s) IV Push every 6 hours PRN Nausea  oxyCODONE    IR 5 milliGRAM(s) Oral every 4 hours PRN Moderate Pain (4 - 6)  oxyCODONE    IR 10 milliGRAM(s) Oral every 4 hours PRN Severe Pain (7 - 10)      LABS:                        9.6    6.91  )-----------( 130      ( 29 Jan 2022 07:35 )             28.7     01-29    138  |  106  |  6<L>  ----------------------------<  118<H>  3.6   |  23  |  0.58    Ca    8.5      29 Jan 2022 07:35  Phos  3.2     01-29  Mg     1.90     01-29    TPro  6.4  /  Alb  3.4  /  TBili  2.4<H>  /  DBili  x   /  AST  43<H>  /  ALT  38<H>  /  AlkPhos  154<H>  01-29      LIVER FUNCTIONS - ( 29 Jan 2022 07:35 )  Alb: 3.4 g/dL / Pro: 6.4 g/dL / ALK PHOS: 154 U/L / ALT: 38 U/L / AST: 43 U/L / GGT: x

## 2022-01-30 LAB
ALBUMIN SERPL ELPH-MCNC: 3.6 G/DL — SIGNIFICANT CHANGE UP (ref 3.3–5)
ALP SERPL-CCNC: 173 U/L — HIGH (ref 40–120)
ALT FLD-CCNC: 35 U/L — HIGH (ref 4–33)
ANION GAP SERPL CALC-SCNC: 10 MMOL/L — SIGNIFICANT CHANGE UP (ref 7–14)
AST SERPL-CCNC: 44 U/L — HIGH (ref 4–32)
BILIRUB SERPL-MCNC: 2.4 MG/DL — HIGH (ref 0.2–1.2)
BLD GP AB SCN SERPL QL: NEGATIVE — SIGNIFICANT CHANGE UP
BUN SERPL-MCNC: 7 MG/DL — SIGNIFICANT CHANGE UP (ref 7–23)
CALCIUM SERPL-MCNC: 9 MG/DL — SIGNIFICANT CHANGE UP (ref 8.4–10.5)
CHLORIDE SERPL-SCNC: 103 MMOL/L — SIGNIFICANT CHANGE UP (ref 98–107)
CO2 SERPL-SCNC: 25 MMOL/L — SIGNIFICANT CHANGE UP (ref 22–31)
CREAT SERPL-MCNC: 0.55 MG/DL — SIGNIFICANT CHANGE UP (ref 0.5–1.3)
GLUCOSE BLDC GLUCOMTR-MCNC: 115 MG/DL — HIGH (ref 70–99)
GLUCOSE BLDC GLUCOMTR-MCNC: 116 MG/DL — HIGH (ref 70–99)
GLUCOSE BLDC GLUCOMTR-MCNC: 127 MG/DL — HIGH (ref 70–99)
GLUCOSE BLDC GLUCOMTR-MCNC: 131 MG/DL — HIGH (ref 70–99)
GLUCOSE SERPL-MCNC: 109 MG/DL — HIGH (ref 70–99)
HCT VFR BLD CALC: 32.2 % — LOW (ref 34.5–45)
HGB BLD-MCNC: 10.5 G/DL — LOW (ref 11.5–15.5)
MAGNESIUM SERPL-MCNC: 2.1 MG/DL — SIGNIFICANT CHANGE UP (ref 1.6–2.6)
MCHC RBC-ENTMCNC: 26.3 PG — LOW (ref 27–34)
MCHC RBC-ENTMCNC: 32.6 GM/DL — SIGNIFICANT CHANGE UP (ref 32–36)
MCV RBC AUTO: 80.7 FL — SIGNIFICANT CHANGE UP (ref 80–100)
NRBC # BLD: 0 /100 WBCS — SIGNIFICANT CHANGE UP
NRBC # FLD: 0 K/UL — SIGNIFICANT CHANGE UP
PHOSPHATE SERPL-MCNC: 3.1 MG/DL — SIGNIFICANT CHANGE UP (ref 2.5–4.5)
PLATELET # BLD AUTO: 164 K/UL — SIGNIFICANT CHANGE UP (ref 150–400)
POTASSIUM SERPL-MCNC: 3.9 MMOL/L — SIGNIFICANT CHANGE UP (ref 3.5–5.3)
POTASSIUM SERPL-SCNC: 3.9 MMOL/L — SIGNIFICANT CHANGE UP (ref 3.5–5.3)
PROT SERPL-MCNC: 7.5 G/DL — SIGNIFICANT CHANGE UP (ref 6–8.3)
RBC # BLD: 3.99 M/UL — SIGNIFICANT CHANGE UP (ref 3.8–5.2)
RBC # FLD: 19.6 % — HIGH (ref 10.3–14.5)
RH IG SCN BLD-IMP: POSITIVE — SIGNIFICANT CHANGE UP
SODIUM SERPL-SCNC: 138 MMOL/L — SIGNIFICANT CHANGE UP (ref 135–145)
WBC # BLD: 7.34 K/UL — SIGNIFICANT CHANGE UP (ref 3.8–10.5)
WBC # FLD AUTO: 7.34 K/UL — SIGNIFICANT CHANGE UP (ref 3.8–10.5)

## 2022-01-30 PROCEDURE — 71045 X-RAY EXAM CHEST 1 VIEW: CPT | Mod: 26

## 2022-01-30 RX ADMIN — Medication 975 MILLIGRAM(S): at 05:07

## 2022-01-30 RX ADMIN — Medication 975 MILLIGRAM(S): at 11:50

## 2022-01-30 RX ADMIN — SODIUM CHLORIDE 3 MILLILITER(S): 9 INJECTION INTRAMUSCULAR; INTRAVENOUS; SUBCUTANEOUS at 13:52

## 2022-01-30 RX ADMIN — Medication 975 MILLIGRAM(S): at 00:30

## 2022-01-30 RX ADMIN — PANTOPRAZOLE SODIUM 40 MILLIGRAM(S): 20 TABLET, DELAYED RELEASE ORAL at 06:53

## 2022-01-30 RX ADMIN — Medication 975 MILLIGRAM(S): at 11:20

## 2022-01-30 RX ADMIN — SODIUM CHLORIDE 3 MILLILITER(S): 9 INJECTION INTRAMUSCULAR; INTRAVENOUS; SUBCUTANEOUS at 21:45

## 2022-01-30 RX ADMIN — GABAPENTIN 300 MILLIGRAM(S): 400 CAPSULE ORAL at 17:44

## 2022-01-30 RX ADMIN — Medication 975 MILLIGRAM(S): at 18:04

## 2022-01-30 RX ADMIN — ENOXAPARIN SODIUM 40 MILLIGRAM(S): 100 INJECTION SUBCUTANEOUS at 21:47

## 2022-01-30 RX ADMIN — Medication 100 MILLIGRAM(S): at 05:07

## 2022-01-30 RX ADMIN — Medication 100 MILLIGRAM(S): at 21:46

## 2022-01-30 RX ADMIN — SODIUM CHLORIDE 3 MILLILITER(S): 9 INJECTION INTRAMUSCULAR; INTRAVENOUS; SUBCUTANEOUS at 05:58

## 2022-01-30 RX ADMIN — GABAPENTIN 300 MILLIGRAM(S): 400 CAPSULE ORAL at 05:07

## 2022-01-30 RX ADMIN — Medication 975 MILLIGRAM(S): at 17:44

## 2022-01-30 RX ADMIN — INSULIN GLARGINE 10 UNIT(S): 100 INJECTION, SOLUTION SUBCUTANEOUS at 22:51

## 2022-01-30 RX ADMIN — ATORVASTATIN CALCIUM 10 MILLIGRAM(S): 80 TABLET, FILM COATED ORAL at 21:46

## 2022-01-30 RX ADMIN — Medication 100 MILLIGRAM(S): at 13:33

## 2022-01-30 NOTE — PROGRESS NOTE ADULT - ASSESSMENT
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  Patient is now S/P ventral hernia repair with mesh on 1/21/22 complicated by left inferior epigastric artery pseudoaneurysm Status post embolization 1/26 now with stable h/h doing well, tolerating regular diet    PLAN:  - Pain much improved, c/w abdominal binder  - Continue current medications  - Dispo: PT recommending rehab however family wants home w/ home PT so will touch base w/ CM re: home PT set up    D Team Surgery  [id number]

## 2022-01-30 NOTE — PROGRESS NOTE ADULT - ASSESSMENT
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 Admitted to SICU for concern of abdominal compartment syndrome. Patient now on surgical floor since 1/23. Post operative course complicated by rectus muscle hematoma with concerns for active bleeding. Patient was requiring multiple PRBC transfusions.  Patient now s/p abdominopelvic angiogram and left inferior epigastric artery embolization on 1/26 with IR. She continues to be hemodynamically stable, patient looks well.    - continue abdominal binder with ABDs  - Ok to discharge home from plastics perspective  - Follow-up in plastic surgery clinic in 1 week, call to make appointment  - Dispo per primary team      Plastic Surgery (pg MANINDER: [id number], NS: [phone])

## 2022-01-30 NOTE — PROGRESS NOTE ADULT - SUBJECTIVE AND OBJECTIVE BOX
PLASTIC SURGERY PROGRESS NOTE      SUBJECTIVE/ROS:   No acute events overnight  Still with abdominal pain, but better now     OBJECTIVE:    Vital Signs Last 24 Hrs  T(C): 36.6 (30 Jan 2022 05:53), Max: 36.8 (29 Jan 2022 20:50)  T(F): 97.8 (30 Jan 2022 05:53), Max: 98.3 (29 Jan 2022 20:50)  HR: 75 (30 Jan 2022 05:53) (72 - 80)  BP: 111/61 (30 Jan 2022 05:53) (111/61 - 122/68)  BP(mean): --  RR: 18 (30 Jan 2022 05:53) (18 - 18)  SpO2: 99% (30 Jan 2022 05:53) (98% - 100%)    PHYSICAL EXAM:  Constitutional:  NAD  Neuro: AOx3  Respiratory: Non-labored breathing  Abd: soft, incisions intact, drains previously removed, mild ttp and distention        I&Os:  I&O's Detail    29 Jan 2022 07:01  -  30 Jan 2022 07:00  --------------------------------------------------------  IN:    Oral Fluid: 120 mL  Total IN: 120 mL    OUT:    Voided (mL): 1400 mL  Total OUT: 1400 mL    Total NET: -1280 mL          LABS:                        10.5   7.34  )-----------( 164      ( 30 Jan 2022 08:20 )             32.2     01-30    138  |  103  |  7   ----------------------------<  109<H>  3.9   |  25  |  0.55    Ca    9.0      30 Jan 2022 08:20  Phos  3.1     01-30  Mg     2.10     01-30    TPro  7.5  /  Alb  3.6  /  TBili  2.4<H>  /  DBili  x   /  AST  44<H>  /  ALT  35<H>  /  AlkPhos  173<H>  01-30

## 2022-01-30 NOTE — PROGRESS NOTE ADULT - SUBJECTIVE AND OBJECTIVE BOX
Surgery Progress Note    INTERVAl/SUBJECTIVE: Patient was seen and examined at bedside. No acute events overnight. Had pain and was coughing overnight (similar to the pain she had over the previous days) and her pain is better controlled now. Denies nausea or vomiting, but feels weak. Tolerating her diet, mostly eating eggs. Passing flatus with last bowel movement noted 2 days ago. Tessalon perles were started for cough, CXR was ordered,    Vital Signs Last 24 Hrs  T(C): 37.3 (29 Jan 2022 00:46), Max: 37.3 (29 Jan 2022 00:46)  T(F): 99.1 (29 Jan 2022 00:46), Max: 99.1 (29 Jan 2022 00:46)  HR: 83 (29 Jan 2022 00:46) (77 - 95)  BP: 124/65 (29 Jan 2022 00:46) (118/61 - 134/74)  BP(mean): --  RR: 18 (29 Jan 2022 00:46) (18 - 18)  SpO2: 99% (29 Jan 2022 00:46) (95% - 99%)    Physical Exam:  General: NAD  Neuro: AOX3  Abdomen: Soft, appropriate incisional tenderness. Wound clean, dry intact.    LABS:                        9.9    7.88  )-----------( 123      ( 28 Jan 2022 07:25 )             29.4     01-28    137  |  103  |  6<L>  ----------------------------<  158<H>  4.1   |  25  |  0.61    Ca    8.7      28 Jan 2022 07:25  Phos  2.9     01-28  Mg     1.80     01-28    TPro  x   /  Alb  x   /  TBili  x   /  DBili  0.5<H>  /  AST  x   /  ALT  x   /  AlkPhos  x   01-28    PT/INR - ( 27 Jan 2022 04:24 )   PT: 14.4 sec;   INR: 1.28 ratio         PTT - ( 27 Jan 2022 04:24 )  PTT:28.9 sec      INs and OUTs:    01-27-22 @ 07:01  -  01-28-22 @ 07:00  --------------------------------------------------------  IN: 0 mL / OUT: 2400 mL / NET: -2400 mL    01-28-22 @ 07:01  -  01-29-22 @ 02:40  --------------------------------------------------------  IN: 180 mL / OUT: 950 mL / NET: -770 mL     Surgery Progress Note    INTERVAl/SUBJECTIVE: Patient was seen and examined at bedside. No acute events overnight. Had pain and was coughing overnight (similar to the pain she had over the previous days) and her pain is better controlled now. Denies nausea or vomiting, but feels weak. Tolerating her diet, mostly eating eggs. Passing flatus with last bowel movement noted 2 days ago. Tessalon perles were started for cough, CXR was ordered.    Vital Signs Last 24 Hrs  T(C): 37.3 (29 Jan 2022 00:46), Max: 37.3 (29 Jan 2022 00:46)  T(F): 99.1 (29 Jan 2022 00:46), Max: 99.1 (29 Jan 2022 00:46)  HR: 83 (29 Jan 2022 00:46) (77 - 95)  BP: 124/65 (29 Jan 2022 00:46) (118/61 - 134/74)  BP(mean): --  RR: 18 (29 Jan 2022 00:46) (18 - 18)  SpO2: 99% (29 Jan 2022 00:46) (95% - 99%)    Physical Exam:  General: NAD  Neuro: AOX3  Abdomen: Soft, appropriate incisional tenderness. Wound clean, dry intact.    LABS:                        9.9    7.88  )-----------( 123      ( 28 Jan 2022 07:25 )             29.4     01-28    137  |  103  |  6<L>  ----------------------------<  158<H>  4.1   |  25  |  0.61    Ca    8.7      28 Jan 2022 07:25  Phos  2.9     01-28  Mg     1.80     01-28    TPro  x   /  Alb  x   /  TBili  x   /  DBili  0.5<H>  /  AST  x   /  ALT  x   /  AlkPhos  x   01-28    PT/INR - ( 27 Jan 2022 04:24 )   PT: 14.4 sec;   INR: 1.28 ratio         PTT - ( 27 Jan 2022 04:24 )  PTT:28.9 sec      INs and OUTs:    01-27-22 @ 07:01  -  01-28-22 @ 07:00  --------------------------------------------------------  IN: 0 mL / OUT: 2400 mL / NET: -2400 mL    01-28-22 @ 07:01  -  01-29-22 @ 02:40  --------------------------------------------------------  IN: 180 mL / OUT: 950 mL / NET: -770 mL

## 2022-01-31 VITALS
RESPIRATION RATE: 17 BRPM | OXYGEN SATURATION: 98 % | TEMPERATURE: 99 F | HEART RATE: 79 BPM | SYSTOLIC BLOOD PRESSURE: 113 MMHG | DIASTOLIC BLOOD PRESSURE: 60 MMHG

## 2022-01-31 PROBLEM — C24.1 MALIGNANT NEOPLASM OF AMPULLA OF VATER: Chronic | Status: ACTIVE | Noted: 2022-01-07

## 2022-01-31 LAB
GLUCOSE BLDC GLUCOMTR-MCNC: 157 MG/DL — HIGH (ref 70–99)
GLUCOSE BLDC GLUCOMTR-MCNC: 164 MG/DL — HIGH (ref 70–99)
GLUCOSE BLDC GLUCOMTR-MCNC: 192 MG/DL — HIGH (ref 70–99)

## 2022-01-31 RX ORDER — ACETAMINOPHEN 500 MG
3 TABLET ORAL
Qty: 0 | Refills: 0 | DISCHARGE
Start: 2022-01-31

## 2022-01-31 RX ADMIN — Medication 100 MILLIGRAM(S): at 06:06

## 2022-01-31 RX ADMIN — SODIUM CHLORIDE 3 MILLILITER(S): 9 INJECTION INTRAMUSCULAR; INTRAVENOUS; SUBCUTANEOUS at 16:14

## 2022-01-31 RX ADMIN — Medication 1: at 12:41

## 2022-01-31 RX ADMIN — Medication 1: at 09:33

## 2022-01-31 RX ADMIN — SODIUM CHLORIDE 3 MILLILITER(S): 9 INJECTION INTRAMUSCULAR; INTRAVENOUS; SUBCUTANEOUS at 06:34

## 2022-01-31 RX ADMIN — Medication 975 MILLIGRAM(S): at 12:41

## 2022-01-31 RX ADMIN — GABAPENTIN 300 MILLIGRAM(S): 400 CAPSULE ORAL at 17:51

## 2022-01-31 RX ADMIN — Medication 100 MILLIGRAM(S): at 14:24

## 2022-01-31 RX ADMIN — PANTOPRAZOLE SODIUM 40 MILLIGRAM(S): 20 TABLET, DELAYED RELEASE ORAL at 06:07

## 2022-01-31 RX ADMIN — GABAPENTIN 300 MILLIGRAM(S): 400 CAPSULE ORAL at 06:06

## 2022-01-31 RX ADMIN — Medication 975 MILLIGRAM(S): at 17:50

## 2022-01-31 RX ADMIN — Medication 975 MILLIGRAM(S): at 00:41

## 2022-01-31 RX ADMIN — Medication 975 MILLIGRAM(S): at 06:07

## 2022-01-31 RX ADMIN — Medication 1: at 17:50

## 2022-01-31 RX ADMIN — Medication 975 MILLIGRAM(S): at 01:30

## 2022-01-31 NOTE — PROGRESS NOTE ADULT - SUBJECTIVE AND OBJECTIVE BOX
PLASTIC SURGERY PROGRESS NOTE      SUBJECTIVE/ROS:   No acute events overnight  Still with abdominal pain, but better now     OBJECTIVE:    Vital Signs Last 24 Hrs  T(C): 36.6 (31 Jan 2022 06:05), Max: 37.2 (30 Jan 2022 15:47)  T(F): 97.9 (31 Jan 2022 06:05), Max: 99 (30 Jan 2022 15:47)  HR: 71 (31 Jan 2022 06:05) (64 - 76)  BP: 115/59 (31 Jan 2022 06:05) (114/59 - 122/57)  BP(mean): --  RR: 18 (31 Jan 2022 06:05) (16 - 18)  SpO2: 98% (31 Jan 2022 06:05) (98% - 99%)    PHYSICAL EXAM:  Constitutional:  NAD  Neuro: AOx3  Respiratory: Non-labored breathing  Abd: soft, incisions intact, drains previously removed, mild ttp and distention          LABS:                        10.5   7.34  )-----------( 164      ( 30 Jan 2022 08:20 )             32.2     01-30    138  |  103  |  7   ----------------------------<  109<H>  3.9   |  25  |  0.55    Ca    9.0      30 Jan 2022 08:20  Phos  3.1     01-30  Mg     2.10     01-30    TPro  7.5  /  Alb  3.6  /  TBili  2.4<H>  /  DBili  x   /  AST  44<H>  /  ALT  35<H>  /  AlkPhos  173<H>  01-30

## 2022-01-31 NOTE — PROGRESS NOTE ADULT - ASSESSMENT
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 Admitted to SICU for concern of abdominal compartment syndrome. Patient now on surgical floor since 1/23. Post operative course complicated by rectus muscle hematoma with concerns for active bleeding. Patient was requiring multiple PRBC transfusions.  Patient now s/p abdominopelvic angiogram and left inferior epigastric artery embolization on 1/26 with IR. She continues to be hemodynamically stable, patient looks well.    - Plan unchanged  - continue abdominal binder with ABDs  - Ok to discharge home from plastics perspective  - Follow-up in plastic surgery clinic in 1 week, call to make appointment  - Dispo per primary team      Plastic Surgery (pg MANINDER: [id number], NS: [phone])

## 2022-01-31 NOTE — PROGRESS NOTE ADULT - PROVIDER SPECIALTY LIST ADULT
Intervent Radiology
Plastic Surgery
Surgery
Surgery
Pain Medicine
Plastic Surgery
Plastic Surgery
SICU
Surgery
Plastic Surgery
SICU
Surgery

## 2022-01-31 NOTE — DIETITIAN INITIAL EVALUATION ADULT. - OTHER INFO
Pt has a history of ampullary cancer s/p Whipple procedure, repair of gastroduodenal artery bleed and drainage of abscess/repair of abdominal wall. Pt presents with abdominal hernia.  Spoke with Pt and Pt's daughter who acted as  as per Pt's request. Pt's appetite and po intake have been less that her usual due to pain. Pt estimated eating about 50 to 75%of her usual intake. She also does not like hospital food. Daughter has been bringing food from home which Pt does eat. Neither Pt nor daughter were able to provide a weight history but a review of HIE showed Pt's weight to be 50 kg in 11/2021. Her admission weight was 46.6 kg. Pt had a 2.4 kg decrease in weight in 2 months. Pt declined Glucerna supplement.  Both Pt and daughter state that Pt follows a consistent carb diet at home. Reviewed diet with them. Encouraged Pt to increase po intake.

## 2022-01-31 NOTE — PROGRESS NOTE ADULT - ASSESSMENT
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  Patient is now S/P ventral hernia repair with mesh on 1/21/22 complicated by left inferior epigastric artery pseudoaneurysm Status post embolization 1/26 now with stable h/h doing well, tolerating regular diet ready for dc    PLAN:  - continue binder as per plastics  - dc home today with home pt  - Seen and discussed with D team

## 2022-01-31 NOTE — PROGRESS NOTE ADULT - SUBJECTIVE AND OBJECTIVE BOX
Surgery Progress Note    INTERVAl/SUBJECTIVE: No acute event overnight.     Vital Signs Last 24 Hrs  T(C): 36.8 (31 Jan 2022 00:57), Max: 37.2 (30 Jan 2022 15:47)  T(F): 98.3 (31 Jan 2022 00:57), Max: 99 (30 Jan 2022 15:47)  HR: 75 (31 Jan 2022 00:57) (64 - 76)  BP: 114/59 (31 Jan 2022 00:57) (111/61 - 122/57)  BP(mean): --  RR: 18 (31 Jan 2022 00:57) (16 - 18)  SpO2: 99% (31 Jan 2022 00:57) (99% - 99%)    Physical Exam:  General:  Neuro:    CV:   Abdomen:     LABS:                        10.5   7.34  )-----------( 164      ( 30 Jan 2022 08:20 )             32.2     01-30    138  |  103  |  7   ----------------------------<  109<H>  3.9   |  25  |  0.55    Ca    9.0      30 Jan 2022 08:20  Phos  3.1     01-30  Mg     2.10     01-30    TPro  7.5  /  Alb  3.6  /  TBili  2.4<H>  /  DBili  x   /  AST  44<H>  /  ALT  35<H>  /  AlkPhos  173<H>  01-30          INs and OUTs:    01-29-22 @ 07:01 - 01-30-22 @ 07:00  --------------------------------------------------------  IN: 120 mL / OUT: 1400 mL / NET: -1280 mL    01-30-22 @ 07:01  -  01-31-22 @ 02:22  --------------------------------------------------------  IN: 0 mL / OUT: 800 mL / NET: -800 mL     Surgery Progress Note    INTERVAl/SUBJECTIVE: No acute event overnight. Pt without complaints she states she is ready to go home    Vital Signs Last 24 Hrs  T(C): 36.8 (31 Jan 2022 00:57), Max: 37.2 (30 Jan 2022 15:47)  T(F): 98.3 (31 Jan 2022 00:57), Max: 99 (30 Jan 2022 15:47)  HR: 75 (31 Jan 2022 00:57) (64 - 76)  BP: 114/59 (31 Jan 2022 00:57) (111/61 - 122/57)  BP(mean): --  RR: 18 (31 Jan 2022 00:57) (16 - 18)  SpO2: 99% (31 Jan 2022 00:57) (99% - 99%)    Physical Exam:  General: awake and alert appears well  Abdomen: soft nontender, binder in place    LABS:                        10.5   7.34  )-----------( 164      ( 30 Jan 2022 08:20 )             32.2     01-30    138  |  103  |  7   ----------------------------<  109<H>  3.9   |  25  |  0.55    Ca    9.0      30 Jan 2022 08:20  Phos  3.1     01-30  Mg     2.10     01-30    TPro  7.5  /  Alb  3.6  /  TBili  2.4<H>  /  DBili  x   /  AST  44<H>  /  ALT  35<H>  /  AlkPhos  173<H>  01-30          INs and OUTs:    01-29-22 @ 07:01 - 01-30-22 @ 07:00  --------------------------------------------------------  IN: 120 mL / OUT: 1400 mL / NET: -1280 mL    01-30-22 @ 07:01 - 01-31-22 @ 02:22  --------------------------------------------------------  IN: 0 mL / OUT: 800 mL / NET: -800 mL

## 2022-02-07 LAB — GLUCOSE BLDC GLUCOMTR-MCNC: 146 MG/DL — HIGH (ref 70–99)

## 2022-02-15 ENCOUNTER — APPOINTMENT (OUTPATIENT)
Dept: SURGICAL ONCOLOGY | Facility: CLINIC | Age: 57
End: 2022-02-15
Payer: MEDICAID

## 2022-02-18 NOTE — PROGRESS NOTE ADULT - PROBLEM/PLAN-2
Bed: 19  Expected date: 2/18/22  Expected time:   Means of arrival:   Comments:  Jovi   
DISPLAY PLAN FREE TEXT

## 2022-02-28 ENCOUNTER — APPOINTMENT (OUTPATIENT)
Dept: PLASTIC SURGERY | Facility: HOSPITAL | Age: 57
End: 2022-02-28

## 2022-02-28 ENCOUNTER — OUTPATIENT (OUTPATIENT)
Dept: OUTPATIENT SERVICES | Facility: HOSPITAL | Age: 57
LOS: 1 days | End: 2022-02-28

## 2022-02-28 VITALS
WEIGHT: 107 LBS | HEIGHT: 60 IN | BODY MASS INDEX: 21.01 KG/M2 | TEMPERATURE: 97.2 F | SYSTOLIC BLOOD PRESSURE: 121 MMHG | DIASTOLIC BLOOD PRESSURE: 65 MMHG | HEART RATE: 68 BPM

## 2022-02-28 DIAGNOSIS — K92.2 GASTROINTESTINAL HEMORRHAGE, UNSPECIFIED: Chronic | ICD-10-CM

## 2022-02-28 DIAGNOSIS — Z90.410 ACQUIRED TOTAL ABSENCE OF PANCREAS: Chronic | ICD-10-CM

## 2022-02-28 DIAGNOSIS — Z51.11 ENCOUNTER FOR ANTINEOPLASTIC CHEMOTHERAPY: Chronic | ICD-10-CM

## 2022-02-28 DIAGNOSIS — R10.9 UNSPECIFIED ABDOMINAL PAIN: ICD-10-CM

## 2022-02-28 DIAGNOSIS — Z98.890 OTHER SPECIFIED POSTPROCEDURAL STATES: Chronic | ICD-10-CM

## 2022-02-28 NOTE — ASSESSMENT
[FreeTextEntry1] : 57F s/p hernia repair with component separation and retro-rectus mesh.  overall well, chronic pain complaints.\par \par -f/u with pain management for continuing pain management\par -f/u with Dr. Bourne tomorrow\par -return to plastic surgery clinic PRN\par \par BSmith

## 2022-02-28 NOTE — HISTORY OF PRESENT ILLNESS
[FreeTextEntry1] : 57F s/p ventral hernia repair with component separation retro-rectus mesh.  Complicated by retro-rectus hematoma that needed embolization.  Now overall well, but with chronic pain complaints, similar to pain complaints after original Whipple.  She is up and walking, tolerating diet, having BMs, currently taking tylenol and gabapentin.  Has been following with a pain management MD from last surgery.  Has not seen since most recent surgery. Has an appt with Dr. Bourne tomorrow. \par \par Seen with daughter.

## 2022-02-28 NOTE — PHYSICAL EXAM
[NI] : Normal [de-identified] : soft, incision well healed.  no drains.  mild tenderness on palpation.  no collections.

## 2022-03-01 ENCOUNTER — APPOINTMENT (OUTPATIENT)
Dept: SURGICAL ONCOLOGY | Facility: CLINIC | Age: 57
End: 2022-03-01
Payer: MEDICAID

## 2022-03-01 VITALS
TEMPERATURE: 97.2 F | SYSTOLIC BLOOD PRESSURE: 105 MMHG | HEART RATE: 65 BPM | HEIGHT: 60 IN | DIASTOLIC BLOOD PRESSURE: 64 MMHG | BODY MASS INDEX: 21.2 KG/M2 | WEIGHT: 108 LBS | OXYGEN SATURATION: 94 % | RESPIRATION RATE: 17 BRPM

## 2022-03-01 DIAGNOSIS — T14.8XXA OTHER INJURY OF UNSPECIFIED BODY REGION, INITIAL ENCOUNTER: ICD-10-CM

## 2022-03-01 DIAGNOSIS — R10.9 UNSPECIFIED ABDOMINAL PAIN: ICD-10-CM

## 2022-03-01 PROCEDURE — 99024 POSTOP FOLLOW-UP VISIT: CPT

## 2022-03-02 ENCOUNTER — RESULT REVIEW (OUTPATIENT)
Age: 57
End: 2022-03-02

## 2022-03-10 ENCOUNTER — OUTPATIENT (OUTPATIENT)
Dept: OUTPATIENT SERVICES | Facility: HOSPITAL | Age: 57
LOS: 1 days | End: 2022-03-10
Payer: MEDICAID

## 2022-03-10 ENCOUNTER — APPOINTMENT (OUTPATIENT)
Dept: CT IMAGING | Facility: IMAGING CENTER | Age: 57
End: 2022-03-10
Payer: MEDICAID

## 2022-03-10 DIAGNOSIS — Z90.410 ACQUIRED TOTAL ABSENCE OF PANCREAS: Chronic | ICD-10-CM

## 2022-03-10 DIAGNOSIS — T14.8XXA OTHER INJURY OF UNSPECIFIED BODY REGION, INITIAL ENCOUNTER: ICD-10-CM

## 2022-03-10 DIAGNOSIS — Z00.8 ENCOUNTER FOR OTHER GENERAL EXAMINATION: ICD-10-CM

## 2022-03-10 DIAGNOSIS — Z51.11 ENCOUNTER FOR ANTINEOPLASTIC CHEMOTHERAPY: Chronic | ICD-10-CM

## 2022-03-10 DIAGNOSIS — Z98.890 OTHER SPECIFIED POSTPROCEDURAL STATES: Chronic | ICD-10-CM

## 2022-03-10 DIAGNOSIS — K92.2 GASTROINTESTINAL HEMORRHAGE, UNSPECIFIED: Chronic | ICD-10-CM

## 2022-03-10 PROCEDURE — 74177 CT ABD & PELVIS W/CONTRAST: CPT

## 2022-03-10 PROCEDURE — 82565 ASSAY OF CREATININE: CPT

## 2022-03-10 PROCEDURE — 74177 CT ABD & PELVIS W/CONTRAST: CPT | Mod: 26

## 2022-03-14 NOTE — ASSESSMENT
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  Well healed incisions.\par \par 12/17/19- She completed 6 months of adjuvant Gemzar/Xeloda under the care of Dr. Navarro.   The patient states she continues to have 9 out of 10 abdominal pain daily with walking and every movement.  She is also experiencing bloating and distention.  She wears an abdominal binder with mild relief.  She was seen by GI who recommended a PPI and Gas-x and the patient states she has minimal relief from both.  Appetite is improving and her weight remains stable.  Reports daily BM's and passing flatus.  Denies fever or chills.  BW 11/2019 -  (29 U/mL); CEA (2.0 ng/mL); LFT's WNL;   CT C/A/P performed on 10/10/19 with stable findings, no evidence of metastatic disease.  Diastasis of the anterior abdominal wall with non obstructed small bowel. \par \par 12/8/2020- Continues f/u with Dr. Katty Navarro and was last seen 11/2/2020.  Patient underwent a f/u CT C/A/P on 11/6/2020 showing an unchanged right cardiophrenic lymph node and 2 small indeterminate lymph nodes at the root of the mesentery which are slightly increased in size.  F/u is recommended to assess for change.   Blood work 11/2/2020 (CEA- 1.9 ng/mL;  - 39 U/mL).    Reports ongoing abdominal pain/discomfort with activity and movement.  She has not seen a hernia specialist.  States her appetite isn't as great since surgery.  Weight remains stable. Denies nausea, vomiting.  Denies changes in bowel habits.  No fever or chills. \par \par 6/8/2021- Pt. was seen by Dr. Veronica on 5/7/2021 with c/o worsening "burning" abdominal pain.  Pts pain is r/t moderate sized ventral hernia for which surgery was not recommended.  CT C/A/P performed on 5/19/2021 showed unchanged cardiophrenic and mesenteric root lymph nodes, widemouthed ventral hernia.  Pt. was referred to pain management and was seen by Dr. Leary on 6/2/2021.  She was started on Gabapentin for neuropathic abdominal pain and Oxy IR 5 mg daily PRN. Pt. may take NSAIDs PRN.  She states that she is still experiencing pain despite taking Gabapentin.  She is now with c/o generalized body pain, not just abdominal pain. Continues to wear abdominal binder with some relief.  Pt. also advised to take up to 3 senna daily for constipation.  She continues f/u with Dr. Navarro and was last seen 5/10/2021.  BW 5/2021- CEA (1.4)  (33).  \par \par 12/7/2021- Pt. continues to experience generalized abdominal pain/discomfort.  She had a colonoscopy on 10/13/2021 and was found to have 3 benign polyps (2 hyperplastic polyps and 1 tubular adenoma).  Remains on Gabapentin for neuropathic abdominal pain. Reports occasional constipation which is relieved with senna/Colace. Continues to wear an abdominal binder.  Met with general surgeon?  Pt. continues f/u with Dr. Katty Navarro ([facility]).  BW 11/2021 (CEA- 1.6 ng/mL);  (40 U/mL).  Pt. is scheduled for a CT C/A/P today at 6pm. \par \par CT C/A/P 12/7/2021- IMP: Stable disease.  S/p Whipple.  Prominent cardiophrenic lymph node and lymph nodes at the root of the mesentery are unchanged in size when compared to prior scan on 5/19/2021. \par \par **Given life limiting symptoms of abdominal pain and large ventral hernia defect with loss of domain, she is functionally limited by it.  She describes being unable to  heavier items, bend over, and pain on straining. It is unclear whether this is directly related to hernia or due to past surgical history and Whipple, however she is far enough out from surgery without evidence of recurrence that it is reasonable to repair her hernia to improve function and also hopefully improve pain symptoms.  I explained to her that the pain may still be present after surgery.\par \par ***SURGERY 1/21/2022-  S/p ventral hernia repair;  Abdominal wall reconstruction with mesh placement done by Dr. Hudson.   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 Status post embolization 1/26/22, with stabilized H/H.  Pt. was discharged home on 1/31/2022. \par \par PLAN:\par 1) CT A/P now (hematoma extended down to pelvis) \par 2) RTO after imaging\par

## 2022-03-14 NOTE — PHYSICAL EXAM
[Normal] : well developed, well nourished, in no acute distress [de-identified] : healing midline abdominal incision with no evidence of infection ; resolving ecchymoses

## 2022-03-14 NOTE — HISTORY OF PRESENT ILLNESS
[de-identified] : 57 year old female presents for an initial post op visit. \par \par - HOSPITAL COURSE- Hospitalized at [facility] from 1/5/19-3/5/19\par 53 y.o. 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 Patient had acute onset of RUQ pain yesterday, with no inciting factors or relief with aleve. The patient's family also endorsed 1-2 episodes of NBNB emesis yesterday and has had decreased PO intake over the last 36 hours. As of diagnosis of cancer, the patient has not had follow up with oncology or surg-oncology due insurance issues. The patient has an appointment with [facility] on Wednesday for surgical evaluation. In the ED, patient febrile to 101.5, HR 98, BPs decreased to 98/41, found with leukocytosis to 15.2, lactate 4.4. She was found with RUQ pain, fever, and jaundice (Bilirubin 3.9) consistent with acute cholangitis. CT A/P revealed Hyperattenuation and mild wall thickening involving the CBD raises the possibility of cholangitis with pneumobilia related to recent ERCP. In the ED, patient received IVF, vanc/zosyn, morphine, and zofran. Patient admitted with severe sepsis 2/2 acute cholangitis. MICU was consulted for hypotension, but this improved s/p fluids and IV antibiotics and patient did not warrant MICU level care. GI was consulted and recommended ERCP. The patient was maintained on zosyn for IV antibiotics. Patient remained stable on zosyn, white count trended down, lactate improved, and fevers were resolving. BCs ultimately grew E.Coli. 1/7: ERCP performed. Stent was removed from the biliary tree. One plastic stent was placed into the common bile duct. The major papilla appeared to have a mass.Oncology was consulted given patient's poor outpatient oncology follow up due to insurance issues, they recommended CT scan and  MRI abdomen/pelvis with contrast for continued staging of cancer workup. \par \par 1/8/19 CT A/P - showed Small bilateral pleural effusions with bibasilar atelectasis. No lung nodules. Mildly enlarged right cardiophrenic angle lymph node is unchanged since December 18, 2018 is indeterminate\par \par 1/8/19  MRI-  IMPRESSION: *  No suspicious liver lesions. No abnormality is identified to correspond with questionable lesion identified on prior CT. * Intra and extrahepatic biliary ductal dilation, with mural enhancement which may be seen in the setting of cholangitis. *  Cholelithiasis with gallbladder wall thickening.Pt  transferred to surgical oncology service and went to the OR on 1/11/19. \par \par  1/11/19- S/p ex-lap, Whipple pancreaticoduodenectomy for duodenal adenocarcinoma. \par \par FINAL PATHOLOGY:\par -Invasive adenocarcinoma of the duodenal ampulla (Tumor size: 2.5 x 1.5 x 0.8 cm); Tumor stage: pT3b pN1\par -Vascular and perineural invasion identified\par -1/24 lymph nodes involved by adenocarcinoma\par -Negative margins\par -S/p cholecystectomy ; acute cholecystitis with cholelithiasis \par -Bile duct margin with acute inflammation and focal atypia consistent with low grade dysplasia. \par \par Intraop findings: pylorus sparing Whipple.  Pt tolerated procedure well, without complication.\par \par On 1/18/19, patient was a surgical rapid response for which pt transferred to SICU. Patient was febrile to 102. Cultures sent. Antibiotics changed from zosyn to meropenem. Patient was tachypneic and hypotensive to 70s systolic. CBC revealed drop in H/H to 6/19 with elevated lactate of 9. Decision was made to intubate patient. Massive Transfusion protocol initiated. Patient received 4PRBC 5FFP and 1platelet, and bolus 1L of fluid. Surgical team and attending notified when Peak pressures on vent were 50s and increased abdominal distention. Decision was then made to take patient emergently to OR. Patient had evacuation of hematoma, cessation of gastroduodenal artery bleeding with two hemostatic stitches, and placement of abthera VAC. Pt transferred to SICU post op. Patient hypotensive post-op with low UOP. 2 L bolus given.  CBC remained stable. Patient hypotensive to 80s systolic, [place] started and increased, patient was then transitioned to levo. Lactate downtrended to 7.6 and H/H stable. \par \par On 1/19 pt cont to require pressors, resuscitated with IVF, and had drop H/H for which transfused. On 1/21/19, Pt febrile to 100.6 overnight, cultures sent, UA+ nitrite, follow up urine cultures. \par \par Pt RTOR on 1/21/19 for Exploratory laparotomy, drainage of abdominal abscess, repair of abdominal wall hernia with Strattice mesh, b/ abdominal wall advancement flaps. Post-op patient remained intubated and  off vasopressors. FENA calculated to be pre-renal. 500CC albumin bolus given, urine output improved. 1/22/19 attempted spontaneous breathing trials but pt hypoxic. Pt lactate uptrending, resuscitated w/IVF, and pt hypotensive requiring pressors. UCx growing candida, Diflucan changed to micafungin to r/o resistant organisms in the setting of worsening sepsis. On 1/23/19, patient noted to have blood oozing from \par midline incision while hypotensive and on pressors.  Peak pressures noted to be elevated on vent and abdomen increasingly distended.  All staples removed and a large amount of clot evacuated from midline incision. 1U PRBC given. No active bleeding appreciated.  Fascia remains closed.  Wound packed with wet to dry dressing.  Vitamin K given for elevated INR.  1/24/19 nutrition consulted and pt. started on TPN. Plastic consulted bc midline opened for hematoma evacuation yesterday, external tissue expander device (Dermaclose) applied to the wound. Patient hypotensive requiring reinitiation of low dose vasopressors. On 1/25/19, sedatives and pressors were decreased. Pt was volume overloaded and was given Albumin, Lasix, and a Bumex drip. O/N, pt febrile 102, given tylenol. mildly hypotensive SBP 80, started on small dose levo. 1/27 Cont SBT. Decision to continue diuresis with Diamox 1/28 Patient febrile to 101 today. Diuresing with lasix/bumex, giving albumin. Continued SBTs.  On 1/29/19, patient tolerating CPAP. Patient extubated without issue. NGT was removed. Patient started on clears, continue TPN while caloric intake is still low. 1/30/19 CT scan performed and showed b/l moderate pleural effusions with associated atelectasis.  Partially \par imaged right lower lung patchy opacity may represent small infiltrate in the appropriate clinical setting. Moderate abdominopelvic ascites most prominent subjacent to the anterior  abdominal wall.  Left upper and lower quadrant collection measuring greater than fluid attenuation, which may reflect the presence of hemorrhagic products. Mild enhancement is seen in association with \par the anterior abdominal ascites, dependent pelvic ascites, and left mid abdominal collection, consistent with the presence of peritonitis. Findings may be postoperative or related to the presence of hemorrhagic products.Pt started on po labetalol for persistent HTN, however pt with hypotensive episode in PM shortly after administration. Patient given albumin. 2/1/19, persistently febrile, Tmax 101.3, hypotensive, tachycardic, and tachypneac. Albumin and IVF given, BP did not respond. Levophed restarted. JOURDAN X 2 started to drain copious amounts of bilious outpt. JOURDAN #1 700cc and JOURDAN #1 500cc. Given stat dose of vancomycin and \par meropenum.  2/2/19 CT scan performed and showed New diffuse peritoneal enhancement, compatible with peritonitis, more extensive compared to 1/30/2019. Multiple abdominal and pelvic fluid collections consistent with abscesses. New small bowel wall may also edema and colonic wall thickening, likely reactive. Left upper quadrant collection measuring greater than fluid attenuation is grossly unchanged from 1/30/2019 .On 2/3/19 she received 2 u prbc overnight, placed on BIPAP overnight,  vanc \par started overnight, start diflucan, vaso and levo started overnight, ABG and CBC, PM labs, A line placed overnight. Plan for IR drainage tomorrow. On 2/4/19: on pressors overnight, removed in AM. IR today for abscess drainage. advance tiger tube, NGT removed. repeat labs, remove a line after procedure. Increased serobilious wound drainage, BID dressing changes.2/5/19: diflucan to micafungin. decreased seroquil to 25. IV lock. start trickle feeds glucerna. 2/6: no further diuresis at this time, high bilious midline wound output, vac to be placed today by plastics. Sinha out. feeds to goal today.2/7: Central line and sinha removed, discontinued Seroquel, feeds changed to continuous 2/8: Octreotide started for high output from midline wound. Lasix with goal net negative 2L2/9: Resent blood cx and UA, bedside vac change by plastics today.2/11: Patient ordered for CLD w/ TF via NJT, upper and lower portion of abdominal wound closed by plastics, send left JOURDAN for amylase 2/12: CT A&P shows two collections... drainage by IR today. Drain was upsized on the right and left  collection 50cc of pus was drained.2/13: Stable for dispo to floor. 2/15: ID was consulted and recommended Meropenem and to continue Vancomycin for better source control. Patient was also seen by PM&R and they recommended FARIBA if patient is able to participate in care.2/18: Wound was partially closed by plastics with interrupted sutures and an ostomy appliance was placed, VAC removed.2/19 Antibiotics was changed to Meropenem and Zyvox, culture grew VRE.2/20: Patient had a tube check and the LUQ drain was removed. CT showed improvement in abdominal collections. Calorie count was performed. Patient is tolerating a regular diet with GLucerna shakes. Octreotide discontinued on 2/26.2/28: Tube check done and RLQ drain was removed.Patient will be discharged home. Patient will have [facility] for wound care and PT.  She will be discharged on 1 more week of antibiotics (Cipro and Flagyl per ID).  Discharged home on 3/1/19. \par \par INTERVAL HISTORY:\par 3/12/19 -  Doing better.  Tolerating PO diet.  + GI fxn.  Pain well controlled.  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/day.\par \par Recent Labs 3/14/19:\par WBC:   9.4 K/uL\par HGB:  9.0 g/dL\par RBC:  3.07 M/uL\par HCT:  25.9%\par PLT:  298 K/uL\par \par Metabolic:\par Na:  141 mmol/L\par K:  3.8 mmol/L\par Cl:  101 mmol/L\par CO2:  27 mmol/L\par Anion Gap:  13 mmol/L\par Glucose:  139 mg/dL\par BUN:  7 mg/dL\par Creat:  0.38 mg/dL\par Total Protein:  7.3 g/dL\par Albumin:  3.4 g/dL\par Serum Ca:  9.3 mg/dL\par Total Bili:  0.8 mg/dL\par AST:  34 U/L\par ALT:  14 U/L\par ALK Phos:  382 U/L\par eGFR:  121 mL/min/1.73M2\par \par CA 19-9:  42 U/mL\par CEA:  2.0 ng/mL\par \par Hepatitis C:  Nonreactive\par Hepatitis B Core Ab:  Nonreactive\par Hepatitis B Surface Ag:  Nonreactive\par \par APTT:  31.8 sec\par PT:  13.2 sec\par INR:  1.16 ratio\par \par 3/19/19: Today, Ms. Lagunas reports occasional nausea, no vomiting, no pain.  Abdominal wall fistula drained last on Saturday nigt 8 cc.  Not drained since.  Daughter states color of drainage has changed from creamy to tan color.  Denies, fevers, chills.   During this visit, sutures removed from midline incision, as well as drain removed (ostomy appliance).  Wound well healing with granulation tissue in the periphery of wound bed with yellow slough noted in bottom of wound bed.  Wound packed with wet to dry dressing.  Instruction and demonstration provided to patient's daughter on changing wet to dry dressing, however, advised she may return to the ostomy appliance if she feels drainage is increasing.  She will return to office to see me in 2 weeks for follow up.  \par \par INTERVAL HISTORY:\par 4/2/19- Daughter continues to change midline wound with wet to dry gauze dressing daily.  The patient is with c/o 7-8 out of 10 abdominal pain with movement and activity, partially relieved with Oxycodone.  She denies fever or chills.  Denies nausea or vomiting. Having daily Bm's and passing flatus.  Appetite continues to improve. The daughter states she is eating an adequate amount of food.  She is scheduled to see Dr. Katty Navarro (Heme-onc) tomorrow. \par \par 4/21/19 - MRI Abdomen- No evidence of hepatic metastatic disease.\par \par 4/30/19 - Feeling well. Incisions well healed. Scheduled to start chemotherapy under the care of Dr. Ktaty Navarro.  Denies abdominal pain, nausea, vomiting or changes in bowel habits. \par \par 7/30/19 - She is currently on Xeloda/Gemzar under the care of Dr. Katty Navarro.  Xeloda recently on hold due to Grade 2-3 hand foot syndrome.  She continues to use Betamethasone cream for the hand/foot syndrome.   CT Abdomen performed on 5/22/19 showing stable findings. States her appetite continues to improve and her weight remains stable.  Tolerating PO intake without nausea or vomiting. Reports daily BM's and passing flatus.  Denies fever or chills.  Reports occasional lower left/right abdominal discomfort with movement however has relief with wearing an abdominal binder.  \par \par 12/17/19- She completed 6 months of adjuvant Gemzar/Xeloda under the care of Dr. Navarro.   The patient states she continues to have 9 out of 10 abdominal pain daily with walking and every movement.  She is also experiencing bloating and distention.  She wears an abdominal binder with mild relief.  She was seen by GI who recommended a PPI and Gas-x and the patient states she has minimal relief from both.  Appetite is improving and her weight remains stable.  Reports daily BM's and passing flatus.  Denies fever or chills.  BW 11/2019 -  (29 U/mL); CEA (2.0 ng/mL); LFT's WNL;   CT C/A/P performed on 10/10/19 with stable findings, no evidence of metastatic disease.  Diastasis of the anterior abdominal wall with non obstructed small bowel. \par \par 12/8/2020- Continues f/u with Dr. Katty Navarro and was last seen 11/2/2020.  Patient underwent a f/u CT C/A/P on 11/6/2020 showing an unchanged right cardiophrenic lymph node and 2 small indeterminate lymph nodes at the root of the mesentery which are slightly increased in size.  F/u is recommended to assess for change.   Blood work 11/2/2020 (CEA- 1.9 ng/mL;  - 39 U/mL).    Reports ongoing abdominal pain/discomfort with activity and movement.  She has not seen a hernia specialist.  States her appetite isn't as great since surgery.  Weight remains stable. Denies nausea, vomiting.  Denies changes in bowel habits.  No fever or chills. \par \par 6/8/2021- Pt. was seen by Dr. Veronica on 5/7/2021 with c/o worsening "burning" abdominal pain.  Pts pain is r/t moderate sized ventral hernia for which surgery was not recommended.  CT C/A/P performed on 5/19/2021 showed unchanged cardiophrenic and mesenteric root lymph nodes, widemouthed ventral hernia.  Pt. was referred to pain management and was seen by Dr. Leary on 6/2/2021.  She was started on Gabapentin for neuropathic abdominal pain and Oxy IR 5 mg daily PRN. Pt. may take NSAIDs PRN.  She states that she is still experiencing pain despite taking Gabapentin.  She is now with c/o generalized body pain, not just abdominal pain. Continues to wear abdominal binder with some relief.  Pt. also advised to take up to 3 senna daily for constipation.  She continues f/u with Dr. Navarro and was last seen 5/10/2021.  BW 5/2021- CEA (1.4)  (33).  \par \par 12/7/2021- Pt. continues to experience generalized abdominal pain/discomfort.  She had a colonoscopy on 10/13/2021 and was found to have 3 benign polyps (2 hyperplastic polyps and 1 tubular adenoma).  Remains on Gabapentin for neuropathic abdominal pain. Reports occasional constipation which is relieved with senna/colace. Continues to wear an abdominal binder.  Met with general surgeon?  Pt. continues f/u with Dr. Katty Navarro (Heme-onc).  BW 11/2021 (CEA- 1.6 ng/mL);  (40 U/mL).  Pt. is scheduled for a CT C/A/P today at 6pm. \par \par CT C/A/P 12/7/2021- IMP: Stable disease.  S/p Whipple.  Prominent cardiophrenic lymph node and lymph nodes at the root of the mesentery are unchanged in size when compared to prior scan on 5/19/2021. \par \par **Given life limiting symptoms of abdominal pain and large ventral hernia defect with loss of domain, she is functionally limited by it.  She describes being unable to  heavier items, bend over, and pain on straining. It is unclear whether this is directly related to hernia or due to past surgical history and Whipple, however she is far enough out from surgery without evidence of recurrence that it is reasonable to repair her hernia to improve function and also hopefully improve pain symptoms.  I explained to her that the pain may still be present after surgery.\par \par ***SURGERY 1/21/2022-  S/p ventral hernia repair;  Abdominal wall reconstruction with mesh placement done by Dr. Hudson. 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 Status post embolization 1/26/22, with stabilized H/H.  Pt. was discharged home on 1/31/2022. \par \par 3/1/2022-  Pt. continues to have abdominal pain, 6-8/10 on pain scale, worse on the left side.  Pain minimally relieved with Tylenol. States her appetite isn't great but she is eating small amounts. Denies N/V.  Having BM's and passing flatus. Denies fever or chills.

## 2022-03-29 ENCOUNTER — APPOINTMENT (OUTPATIENT)
Dept: SURGICAL ONCOLOGY | Facility: CLINIC | Age: 57
End: 2022-03-29
Payer: MEDICAID

## 2022-03-29 VITALS
WEIGHT: 105 LBS | DIASTOLIC BLOOD PRESSURE: 69 MMHG | OXYGEN SATURATION: 96 % | TEMPERATURE: 98.2 F | HEART RATE: 72 BPM | BODY MASS INDEX: 20.62 KG/M2 | RESPIRATION RATE: 16 BRPM | SYSTOLIC BLOOD PRESSURE: 109 MMHG | HEIGHT: 60 IN

## 2022-03-29 DIAGNOSIS — Z98.890 OTHER SPECIFIED POSTPROCEDURAL STATES: ICD-10-CM

## 2022-03-29 DIAGNOSIS — Z87.19 OTHER SPECIFIED POSTPROCEDURAL STATES: ICD-10-CM

## 2022-03-29 PROCEDURE — 99024 POSTOP FOLLOW-UP VISIT: CPT

## 2022-04-01 NOTE — REASON FOR VISIT
[Post-Op] : a post-op for [Family Member] : family member [[email or web address]] : ampullary cancer ; Now s/p ventral hernia repair with mesh 1/21/2022

## 2022-04-01 NOTE — HISTORY OF PRESENT ILLNESS
[de-identified] : 57 year old female presents for a post op visit. \par \par - HOSPITAL COURSE- Hospitalized at [facility] from 1/5/19-3/5/19\par 53 y.o. 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 Patient had acute onset of RUQ pain yesterday, with no inciting factors or relief with aleve. The patient's family also endorsed 1-2 episodes of NBNB emesis yesterday and has had decreased PO intake over the last 36 hours. As of diagnosis of cancer, the patient has not had follow up with oncology or surg-oncology due insurance issues. The patient has an appointment with [facility] on Wednesday for surgical evaluation. In the ED, patient febrile to 101.5, HR 98, BPs decreased to 98/41, found with leukocytosis to 15.2, lactate 4.4. She was found with RUQ pain, fever, and jaundice (Bilirubin 3.9) consistent with acute cholangitis. CT A/P revealed Hyperattenuation and mild wall thickening involving the CBD raises the possibility of cholangitis with pneumobilia related to recent ERCP. In the ED, patient received IVF, vanc/zosyn, morphine, and zofran. Patient admitted with severe sepsis 2/2 acute cholangitis. MICU was consulted for hypotension, but this improved s/p fluids and IV antibiotics and patient did not warrant MICU level care. GI was consulted and recommended ERCP. The patient was maintained on zosyn for IV antibiotics. Patient remained stable on zosyn, white count trended down, lactate improved, and fevers were resolving. BCs ultimately grew E.Coli. 1/7: ERCP performed. Stent was removed from the biliary tree. One plastic stent was placed into the common bile duct. The major papilla appeared to have a mass.Oncology was consulted given patient's poor outpatient oncology follow up due to insurance issues, they recommended CT scan and  MRI abdomen/pelvis with contrast for continued staging of cancer workup. \par \par 1/8/19 CT A/P - showed Small bilateral pleural effusions with bibasilar atelectasis. No lung nodules. Mildly enlarged right cardiophrenic angle lymph node is unchanged since December 18, 2018 is indeterminate\par \par 1/8/19  MRI-  IMPRESSION: *  No suspicious liver lesions. No abnormality is identified to correspond with questionable lesion identified on prior CT. * Intra and extrahepatic biliary ductal dilation, with mural enhancement which may be seen in the setting of cholangitis. *  Cholelithiasis with gallbladder wall thickening.Pt  transferred to surgical oncology service and went to the OR on 1/11/19. \par \par  1/11/19- S/p ex-lap, Whipple pancreaticoduodenectomy for duodenal adenocarcinoma. \par \par FINAL PATHOLOGY:\par -Invasive adenocarcinoma of the duodenal ampulla (Tumor size: 2.5 x 1.5 x 0.8 cm); Tumor stage: pT3b pN1\par -Vascular and perineural invasion identified\par -1/24 lymph nodes involved by adenocarcinoma\par -Negative margins\par -S/p cholecystectomy ; acute cholecystitis with cholelithiasis \par -Bile duct margin with acute inflammation and focal atypia consistent with low grade dysplasia. \par \par Intraop findings: pylorus sparing Whipple.  Pt tolerated procedure well, without complication.\par \par On 1/18/19, patient was a surgical rapid response for which pt transferred to SICU. Patient was febrile to 102. Cultures sent. Antibiotics changed from zosyn to meropenem. Patient was tachypneic and hypotensive to 70s systolic. CBC revealed drop in H/H to 6/19 with elevated lactate of 9. Decision was made to intubate patient. Massive Transfusion protocol initiated. Patient received 4PRBC 5FFP and 1platelet, and bolus 1L of fluid. Surgical team and attending notified when Peak pressures on vent were 50s and increased abdominal distention. Decision was then made to take patient emergently to OR. Patient had evacuation of hematoma, cessation of gastroduodenal artery bleeding with two hemostatic stitches, and placement of abthera VAC. Pt transferred to SICU post op. Patient hypotensive post-op with low UOP. 2 L bolus given.  CBC remained stable. Patient hypotensive to 80s systolic, [place] started and increased, patient was then transitioned to levo. Lactate downtrended to 7.6 and H/H stable. \par \par On 1/19 pt cont to require pressors, resuscitated with IVF, and had drop H/H for which transfused. On 1/21/19, Pt febrile to 100.6 overnight, cultures sent, UA+ nitrite, follow up urine cultures. \par \par Pt RTOR on 1/21/19 for Exploratory laparotomy, drainage of abdominal abscess, repair of abdominal wall hernia with Strattice mesh, b/ abdominal wall advancement flaps. Post-op patient remained intubated and  off vasopressors. FENA calculated to be pre-renal. 500CC albumin bolus given, urine output improved. 1/22/19 attempted spontaneous breathing trials but pt hypoxic. Pt lactate uptrending, resuscitated w/IVF, and pt hypotensive requiring pressors. UCx growing candida, Diflucan changed to micafungin to r/o resistant organisms in the setting of worsening sepsis. On 1/23/19, patient noted to have blood oozing from \par midline incision while hypotensive and on pressors.  Peak pressures noted to be elevated on vent and abdomen increasingly distended.  All staples removed and a large amount of clot evacuated from midline incision. 1U PRBC given. No active bleeding appreciated.  Fascia remains closed.  Wound packed with wet to dry dressing.  Vitamin K given for elevated INR.  1/24/19 nutrition consulted and pt. started on TPN. Plastic consulted bc midline opened for hematoma evacuation yesterday, external tissue expander device (Dermaclose) applied to the wound. Patient hypotensive requiring reinitiation of low dose vasopressors. On 1/25/19, sedatives and pressors were decreased. Pt was volume overloaded and was given Albumin, Lasix, and a Bumex drip. O/N, pt febrile 102, given tylenol. mildly hypotensive SBP 80, started on small dose levo. 1/27 Cont SBT. Decision to continue diuresis with Diamox 1/28 Patient febrile to 101 today. Diuresing with lasix/bumex, giving albumin. Continued SBTs.  On 1/29/19, patient tolerating CPAP. Patient extubated without issue. NGT was removed. Patient started on clears, continue TPN while caloric intake is still low. 1/30/19 CT scan performed and showed b/l moderate pleural effusions with associated atelectasis.  Partially \par imaged right lower lung patchy opacity may represent small infiltrate in the appropriate clinical setting. Moderate abdominopelvic ascites most prominent subjacent to the anterior  abdominal wall.  Left upper and lower quadrant collection measuring greater than fluid attenuation, which may reflect the presence of hemorrhagic products. Mild enhancement is seen in association with \par the anterior abdominal ascites, dependent pelvic ascites, and left mid abdominal collection, consistent with the presence of peritonitis. Findings may be postoperative or related to the presence of hemorrhagic products.Pt started on po labetalol for persistent HTN, however pt with hypotensive episode in PM shortly after administration. Patient given albumin. 2/1/19, persistently febrile, Tmax 101.3, hypotensive, tachycardic, and tachypneac. Albumin and IVF given, BP did not respond. Levophed restarted. JOURDAN X 2 started to drain copious amounts of bilious outpt. JOURDAN #1 700cc and JOURDAN #1 500cc. Given stat dose of vancomycin and \par meropenum.  2/2/19 CT scan performed and showed New diffuse peritoneal enhancement, compatible with peritonitis, more extensive compared to 1/30/2019. Multiple abdominal and pelvic fluid collections consistent with abscesses. New small bowel wall may also edema and colonic wall thickening, likely reactive. Left upper quadrant collection measuring greater than fluid attenuation is grossly unchanged from 1/30/2019 .On 2/3/19 she received 2 u prbc overnight, placed on BIPAP overnight,  vanc \par started overnight, start diflucan, vaso and levo started overnight, ABG and CBC, PM labs, A line placed overnight. Plan for IR drainage tomorrow. On 2/4/19: on pressors overnight, removed in AM. IR today for abscess drainage. advance tiger tube, NGT removed. repeat labs, remove a line after procedure. Increased serobilious wound drainage, BID dressing changes.2/5/19: diflucan to micafungin. decreased seroquil to 25. IV lock. start trickle feeds glucerna. 2/6: no further diuresis at this time, high bilious midline wound output, vac to be placed today by plastics. Sinha out. feeds to goal today.2/7: Central line and sinha removed, discontinued Seroquel, feeds changed to continuous 2/8: Octreotide started for high output from midline wound. Lasix with goal net negative 2L2/9: Resent blood cx and UA, bedside vac change by plastics today.2/11: Patient ordered for CLD w/ TF via NJT, upper and lower portion of abdominal wound closed by plastics, send left JOURDAN for amylase 2/12: CT A&P shows two collections... drainage by IR today. Drain was upsized on the right and left  collection 50cc of pus was drained.2/13: Stable for dispo to floor. 2/15: ID was consulted and recommended Meropenem and to continue Vancomycin for better source control. Patient was also seen by PM&R and they recommended FARIBA if patient is able to participate in care.2/18: Wound was partially closed by plastics with interrupted sutures and an ostomy appliance was placed, VAC removed.2/19 Antibiotics was changed to Meropenem and Zyvox, culture grew VRE.2/20: Patient had a tube check and the LUQ drain was removed. CT showed improvement in abdominal collections. Calorie count was performed. Patient is tolerating a regular diet with GLucerna shakes. Octreotide discontinued on 2/26.2/28: Tube check done and RLQ drain was removed.Patient will be discharged home. Patient will have VNS for wound care and PT.  She will be discharged on 1 more week of antibiotics (Cipro and Flagyl per ID).  Discharged home on 3/1/19. \par \par INTERVAL HISTORY:\par 3/12/19 -  Doing better.  Tolerating PO diet.  + GI fxn.  Pain well controlled.  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/day.\par \par Recent Labs 3/14/19:\par WBC:   9.4 K/uL\par HGB:  9.0 g/dL\par RBC:  3.07 M/uL\par HCT:  25.9%\par PLT:  298 K/uL\par \par Metabolic:\par Na:  141 mmol/L\par K:  3.8 mmol/L\par Cl:  101 mmol/L\par CO2:  27 mmol/L\par Anion Gap:  13 mmol/L\par Glucose:  139 mg/dL\par BUN:  7 mg/dL\par Creat:  0.38 mg/dL\par Total Protein:  7.3 g/dL\par Albumin:  3.4 g/dL\par Serum Ca:  9.3 mg/dL\par Total Bili:  0.8 mg/dL\par AST:  34 U/L\par ALT:  14 U/L\par ALK Phos:  382 U/L\par eGFR:  121 mL/min/1.73M2\par \par CA 19-9:  42 U/mL\par CEA:  2.0 ng/mL\par \par Hepatitis C:  Nonreactive\par Hepatitis B Core Ab:  Nonreactive\par Hepatitis B Surface Ag:  Nonreactive\par \par APTT:  31.8 sec\par PT:  13.2 sec\par INR:  1.16 ratio\par \par 3/19/19: Today, Ms. Lagunas reports occasional nausea, no vomiting, no pain.  Abdominal wall fistula drained last on Saturday nigt 8 cc.  Not drained since.  Daughter states color of drainage has changed from creamy to tan color.  Denies, fevers, chills.   During this visit, sutures removed from midline incision, as well as drain removed (ostomy appliance).  Wound well healing with granulation tissue in the periphery of wound bed with yellow slough noted in bottom of wound bed.  Wound packed with wet to dry dressing.  Instruction and demonstration provided to patient's daughter on changing wet to dry dressing, however, advised she may return to the ostomy appliance if she feels drainage is increasing.  She will return to office to see me in 2 weeks for follow up.  \par \par INTERVAL HISTORY:\par 4/2/19- Daughter continues to change midline wound with wet to dry gauze dressing daily.  The patient is with c/o 7-8 out of 10 abdominal pain with movement and activity, partially relieved with Oxycodone.  She denies fever or chills.  Denies nausea or vomiting. Having daily Bm's and passing flatus.  Appetite continues to improve. The daughter states she is eating an adequate amount of food.  She is scheduled to see Dr. Katty Navarro (Heme-onc) tomorrow. \par \par 4/21/19 - MRI Abdomen- No evidence of hepatic metastatic disease.\par \par 4/30/19 - Feeling well. Incisions well healed. Scheduled to start chemotherapy under the care of Dr. Katty Navarro.  Denies abdominal pain, nausea, vomiting or changes in bowel habits. \par \par 7/30/19 - She is currently on Xeloda/Gemzar under the care of Dr. Katty Navarro.  Xeloda recently on hold due to Grade 2-3 hand foot syndrome.  She continues to use Betamethasone cream for the hand/foot syndrome.   CT Abdomen performed on 5/22/19 showing stable findings. States her appetite continues to improve and her weight remains stable.  Tolerating PO intake without nausea or vomiting. Reports daily BM's and passing flatus.  Denies fever or chills.  Reports occasional lower left/right abdominal discomfort with movement however has relief with wearing an abdominal binder.  \par \par 12/17/19- She completed 6 months of adjuvant Gemzar/Xeloda under the care of Dr. Navarro.   The patient states she continues to have 9 out of 10 abdominal pain daily with walking and every movement.  She is also experiencing bloating and distention.  She wears an abdominal binder with mild relief.  She was seen by GI who recommended a PPI and Gas-x and the patient states she has minimal relief from both.  Appetite is improving and her weight remains stable.  Reports daily BM's and passing flatus.  Denies fever or chills.  BW 11/2019 -  (29 U/mL); CEA (2.0 ng/mL); LFT's WNL;   CT C/A/P performed on 10/10/19 with stable findings, no evidence of metastatic disease.  Diastasis of the anterior abdominal wall with non obstructed small bowel. \par \par 12/8/2020- Continues f/u with Dr. Katty Navarro and was last seen 11/2/2020.  Patient underwent a f/u CT C/A/P on 11/6/2020 showing an unchanged right cardiophrenic lymph node and 2 small indeterminate lymph nodes at the root of the mesentery which are slightly increased in size.  F/u is recommended to assess for change.   Blood work 11/2/2020 (CEA- 1.9 ng/mL;  - 39 U/mL).    Reports ongoing abdominal pain/discomfort with activity and movement.  She has not seen a hernia specialist.  States her appetite isn't as great since surgery.  Weight remains stable. Denies nausea, vomiting.  Denies changes in bowel habits.  No fever or chills. \par \par 6/8/2021- Pt. was seen by Dr. Veronica on 5/7/2021 with c/o worsening "burning" abdominal pain.  Pts pain is r/t moderate sized ventral hernia for which surgery was not recommended.  CT C/A/P performed on 5/19/2021 showed unchanged cardiophrenic and mesenteric root lymph nodes, widemouthed ventral hernia.  Pt. was referred to pain management and was seen by Dr. Leary on 6/2/2021.  She was started on Gabapentin for neuropathic abdominal pain and Oxy IR 5 mg daily PRN. Pt. may take NSAIDs PRN.  She states that she is still experiencing pain despite taking Gabapentin.  She is now with c/o generalized body pain, not just abdominal pain. Continues to wear abdominal binder with some relief.  Pt. also advised to take up to 3 senna daily for constipation.  She continues f/u with Dr. Navarro and was last seen 5/10/2021.  BW 5/2021- CEA (1.4)  (33).  \par \par 12/7/2021- Pt. continues to experience generalized abdominal pain/discomfort.  She had a colonoscopy on 10/13/2021 and was found to have 3 benign polyps (2 hyperplastic polyps and 1 tubular adenoma).  Remains on Gabapentin for neuropathic abdominal pain. Reports occasional constipation which is relieved with senna/colace. Continues to wear an abdominal binder.  Met with general surgeon?  Pt. continues f/u with Dr. Katty Navarro (Heme-onc).  BW 11/2021 (CEA- 1.6 ng/mL);  (40 U/mL).  Pt. is scheduled for a CT C/A/P today at 6pm. \par \par CT C/A/P 12/7/2021- IMP: Stable disease.  S/p Whipple.  Prominent cardiophrenic lymph node and lymph nodes at the root of the mesentery are unchanged in size when compared to prior scan on 5/19/2021. \par \par **Given life limiting symptoms of abdominal pain and large ventral hernia defect with loss of domain, she is functionally limited by it.  She describes being unable to  heavier items, bend over, and pain on straining. It is unclear whether this is directly related to hernia or due to past surgical history and Whipple, however she is far enough out from surgery without evidence of recurrence that it is reasonable to repair her hernia to improve function and also hopefully improve pain symptoms.  I explained to her that the pain may still be present after surgery.\par \par ***SURGERY 1/21/2022-  S/p ventral hernia repair;  Abdominal wall reconstruction with mesh placement done by Dr. Hudson. 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 Status post embolization 1/26/22, with stabilized H/H.  Pt. was discharged home on 1/31/2022. \par \par 3/1/2022-  Pt. continues to have abdominal pain, 6-8/10 on pain scale, worse on the left side.  Pain minimally relieved with Tylenol. States her appetite isn't great but she is eating small amounts. Denies N/V.  Having BM's and passing flatus. Denies fever or chills. \par \par CT A/P 3/10/22- IMPRESSION:\par 1.)  Significant interval improvement of previously described rectus/anterior abdominal wall hematoma.  Previously described focal \par hematoma at the left anterior abdominal wall is decreased in size compared to the prior study, currently measuring 2.2 x 1.1 x 2.9 cm (image 76, series 2), previously measuring roughly 4.9 x 2.7 x 4.7 cm; please note however that an infected collection cannot be distinguished from a sterile collection on the basis of the current exam and clinical correlation is advised.\par 2.)  Stomach is distended with oral contrast; correlate for delayed gastric emptying.  No evidence for gastric outlet obstruction (oral contrast is seen throughout the small bowel and specifically, distal to the gastrojejunostomy anastomosis). No evidence for bowel obstruction or inflammation. Normal appendix.\par 3.)  Prominent cardiophrenic lymph node and lymph nodes at the root of the mesentery are unchanged compared to multiple prior studies.\par 4.)  Redemonstrated nodular hepatic contour.\par 5.)  Other findings, as above.\par \par 3/29/2022- Here to discuss recent CT results. See above. Continues to have abdominal pain. Denies N/V.

## 2022-04-01 NOTE — ASSESSMENT
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  Well healed incisions.\par \par 12/17/19- She completed 6 months of adjuvant Gemzar/Xeloda under the care of Dr. Navarro.   The patient states she continues to have 9 out of 10 abdominal pain daily with walking and every movement.  She is also experiencing bloating and distention.  She wears an abdominal binder with mild relief.  She was seen by GI who recommended a PPI and Gas-x and the patient states she has minimal relief from both.  Appetite is improving and her weight remains stable.  Reports daily BM's and passing flatus.  Denies fever or chills.  BW 11/2019 -  (29 U/mL); CEA (2.0 ng/mL); LFT's WNL;   CT C/A/P performed on 10/10/19 with stable findings, no evidence of metastatic disease.  Diastasis of the anterior abdominal wall with non obstructed small bowel. \par \par 12/8/2020- Continues f/u with Dr. Katty Navarro and was last seen 11/2/2020.  Patient underwent a f/u CT C/A/P on 11/6/2020 showing an unchanged right cardiophrenic lymph node and 2 small indeterminate lymph nodes at the root of the mesentery which are slightly increased in size.  F/u is recommended to assess for change.   Blood work 11/2/2020 (CEA- 1.9 ng/mL;  - 39 U/mL).    Reports ongoing abdominal pain/discomfort with activity and movement.  She has not seen a hernia specialist.  States her appetite isn't as great since surgery.  Weight remains stable. Denies nausea, vomiting.  Denies changes in bowel habits.  No fever or chills. \par \par 6/8/2021- Pt. was seen by Dr. Veronica on 5/7/2021 with c/o worsening "burning" abdominal pain.  Pts pain is r/t moderate sized ventral hernia for which surgery was not recommended.  CT C/A/P performed on 5/19/2021 showed unchanged cardiophrenic and mesenteric root lymph nodes, widemouthed ventral hernia.  Pt. was referred to pain management and was seen by Dr. Leary on 6/2/2021.  She was started on Gabapentin for neuropathic abdominal pain and Oxy IR 5 mg daily PRN. Pt. may take NSAIDs PRN.  She states that she is still experiencing pain despite taking Gabapentin.  She is now with c/o generalized body pain, not just abdominal pain. Continues to wear abdominal binder with some relief.  Pt. also advised to take up to 3 senna daily for constipation.  She continues f/u with Dr. Navarro and was last seen 5/10/2021.  BW 5/2021- CEA (1.4)  (33).  \par \par 12/7/2021- Pt. continues to experience generalized abdominal pain/discomfort.  She had a colonoscopy on 10/13/2021 and was found to have 3 benign polyps (2 hyperplastic polyps and 1 tubular adenoma).  Remains on Gabapentin for neuropathic abdominal pain. Reports occasional constipation which is relieved with senna/Colace. Continues to wear an abdominal binder.  Met with general surgeon?  Pt. continues f/u with Dr. Katty Navarro (Heme-Onc).  BW 11/2021 (CEA- 1.6 ng/mL);  (40 U/mL).  Pt. is scheduled for a CT C/A/P today at 6pm. \par \par CT C/A/P 12/7/2021- IMP: Stable disease.  S/p Whipple.  Prominent cardiophrenic lymph node and lymph nodes at the root of the mesentery are unchanged in size when compared to prior scan on 5/19/2021. \par \par **Given life limiting symptoms of abdominal pain and large ventral hernia defect with loss of domain, she is functionally limited by it.  She describes being unable to  heavier items, bend over, and pain on straining. It is unclear whether this is directly related to hernia or due to past surgical history and Whipple, however she is far enough out from surgery without evidence of recurrence that it is reasonable to repair her hernia to improve function and also hopefully improve pain symptoms.  I explained to her that the pain may still be present after surgery.\par \par ***SURGERY 1/21/2022-  S/p ventral hernia repair;  Abdominal wall reconstruction with mesh placement done by Dr. Hudson.   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 Status post embolization 1/26/22, with stabilized H/H.  Pt. was discharged home on 1/31/2022. \par \par CT A/P 3/10/22- IMPRESSION:\par 1.)  Significant interval improvement of previously described rectus/anterior abdominal wall hematoma.  Previously described focal hematoma at the left anterior abdominal wall is decreased in size compared to the prior study, currently measuring 2.2 x 1.1 x 2.9 cm (image 76, series 2), previously measuring roughly 4.9 x 2.7 x 4.7 cm; please note however that an infected collection cannot be distinguished from a sterile collection on the basis of the current exam and clinical correlation is advised.\par 2.)  Stomach is distended with oral contrast; correlate for delayed gastric emptying.  No evidence for gastric outlet obstruction (oral contrast is seen throughout the small bowel and specifically, distal to the gastrojejunostomy anastomosis). No evidence for bowel obstruction or inflammation. Normal appendix.\par 3.)  Prominent cardiophrenic lymph node and lymph nodes at the root of the mesentery are unchanged compared to multiple prior studies.\par 4.)  Redemonstrated nodular hepatic contour.\par 5.)  Other findings, as above.\par \par PLAN:\par 1) RTO in 3 months\par 2) Pain control prn

## 2022-04-01 NOTE — PHYSICAL EXAM
[Normal] : well developed, well nourished, in no acute distress [de-identified] : healing midline abdominal incision with no evidence of infection ; resolving ecchymoses ; abdominal binder

## 2022-05-11 ENCOUNTER — OUTPATIENT (OUTPATIENT)
Dept: OUTPATIENT SERVICES | Facility: HOSPITAL | Age: 57
LOS: 1 days | Discharge: HOME | End: 2022-05-11

## 2022-05-11 DIAGNOSIS — Z90.410 ACQUIRED TOTAL ABSENCE OF PANCREAS: Chronic | ICD-10-CM

## 2022-05-11 DIAGNOSIS — Z51.11 ENCOUNTER FOR ANTINEOPLASTIC CHEMOTHERAPY: Chronic | ICD-10-CM

## 2022-05-11 DIAGNOSIS — K92.2 GASTROINTESTINAL HEMORRHAGE, UNSPECIFIED: Chronic | ICD-10-CM

## 2022-05-11 DIAGNOSIS — Z98.890 OTHER SPECIFIED POSTPROCEDURAL STATES: Chronic | ICD-10-CM

## 2022-05-11 DIAGNOSIS — C24.1 MALIGNANT NEOPLASM OF AMPULLA OF VATER: ICD-10-CM

## 2022-05-16 ENCOUNTER — RESULT REVIEW (OUTPATIENT)
Age: 57
End: 2022-05-16

## 2022-05-16 ENCOUNTER — APPOINTMENT (OUTPATIENT)
Dept: INFUSION THERAPY | Facility: HOSPITAL | Age: 57
End: 2022-05-16

## 2022-05-16 ENCOUNTER — APPOINTMENT (OUTPATIENT)
Dept: HEMATOLOGY ONCOLOGY | Facility: CLINIC | Age: 57
End: 2022-05-16
Payer: MEDICAID

## 2022-05-16 VITALS
SYSTOLIC BLOOD PRESSURE: 110 MMHG | DIASTOLIC BLOOD PRESSURE: 72 MMHG | BODY MASS INDEX: 21.1 KG/M2 | HEART RATE: 70 BPM | TEMPERATURE: 97 F | RESPIRATION RATE: 16 BRPM | OXYGEN SATURATION: 97 % | WEIGHT: 108.03 LBS

## 2022-05-16 LAB
ALBUMIN SERPL ELPH-MCNC: 4.3 G/DL — SIGNIFICANT CHANGE UP (ref 3.3–5)
ALP SERPL-CCNC: 143 U/L — HIGH (ref 40–120)
ALT FLD-CCNC: 42 U/L — SIGNIFICANT CHANGE UP (ref 10–45)
ANION GAP SERPL CALC-SCNC: 12 MMOL/L — SIGNIFICANT CHANGE UP (ref 5–17)
AST SERPL-CCNC: 54 U/L — HIGH (ref 10–40)
BASOPHILS # BLD AUTO: 0.02 K/UL — SIGNIFICANT CHANGE UP (ref 0–0.2)
BASOPHILS NFR BLD AUTO: 0.4 % — SIGNIFICANT CHANGE UP (ref 0–2)
BILIRUB SERPL-MCNC: 0.7 MG/DL — SIGNIFICANT CHANGE UP (ref 0.2–1.2)
BUN SERPL-MCNC: 7 MG/DL — SIGNIFICANT CHANGE UP (ref 7–23)
CALCIUM SERPL-MCNC: 10 MG/DL — SIGNIFICANT CHANGE UP (ref 8.4–10.5)
CHLORIDE SERPL-SCNC: 104 MMOL/L — SIGNIFICANT CHANGE UP (ref 96–108)
CO2 SERPL-SCNC: 24 MMOL/L — SIGNIFICANT CHANGE UP (ref 22–31)
CREAT SERPL-MCNC: 0.49 MG/DL — LOW (ref 0.5–1.3)
EGFR: 111 ML/MIN/1.73M2 — SIGNIFICANT CHANGE UP
EOSINOPHIL # BLD AUTO: 0.11 K/UL — SIGNIFICANT CHANGE UP (ref 0–0.5)
EOSINOPHIL NFR BLD AUTO: 2.1 % — SIGNIFICANT CHANGE UP (ref 0–6)
GLUCOSE SERPL-MCNC: 79 MG/DL — SIGNIFICANT CHANGE UP (ref 70–99)
HCT VFR BLD CALC: 34 % — LOW (ref 34.5–45)
HGB BLD-MCNC: 11 G/DL — LOW (ref 11.5–15.5)
IMM GRANULOCYTES NFR BLD AUTO: 0.2 % — SIGNIFICANT CHANGE UP (ref 0–1.5)
LYMPHOCYTES # BLD AUTO: 1.85 K/UL — SIGNIFICANT CHANGE UP (ref 1–3.3)
LYMPHOCYTES # BLD AUTO: 34.6 % — SIGNIFICANT CHANGE UP (ref 13–44)
MAGNESIUM SERPL-MCNC: 1.9 MG/DL — SIGNIFICANT CHANGE UP (ref 1.6–2.6)
MCHC RBC-ENTMCNC: 25.4 PG — LOW (ref 27–34)
MCHC RBC-ENTMCNC: 32.4 G/DL — SIGNIFICANT CHANGE UP (ref 32–36)
MCV RBC AUTO: 78.5 FL — LOW (ref 80–100)
MONOCYTES # BLD AUTO: 0.64 K/UL — SIGNIFICANT CHANGE UP (ref 0–0.9)
MONOCYTES NFR BLD AUTO: 12 % — SIGNIFICANT CHANGE UP (ref 2–14)
NEUTROPHILS # BLD AUTO: 2.71 K/UL — SIGNIFICANT CHANGE UP (ref 1.8–7.4)
NEUTROPHILS NFR BLD AUTO: 50.7 % — SIGNIFICANT CHANGE UP (ref 43–77)
NRBC # BLD: 0 /100 WBCS — SIGNIFICANT CHANGE UP (ref 0–0)
PLATELET # BLD AUTO: 109 K/UL — LOW (ref 150–400)
POTASSIUM SERPL-MCNC: 4.3 MMOL/L — SIGNIFICANT CHANGE UP (ref 3.5–5.3)
POTASSIUM SERPL-SCNC: 4.3 MMOL/L — SIGNIFICANT CHANGE UP (ref 3.5–5.3)
PROT SERPL-MCNC: 7.5 G/DL — SIGNIFICANT CHANGE UP (ref 6–8.3)
RBC # BLD: 4.33 M/UL — SIGNIFICANT CHANGE UP (ref 3.8–5.2)
RBC # FLD: 14.2 % — SIGNIFICANT CHANGE UP (ref 10.3–14.5)
SODIUM SERPL-SCNC: 140 MMOL/L — SIGNIFICANT CHANGE UP (ref 135–145)
WBC # BLD: 5.34 K/UL — SIGNIFICANT CHANGE UP (ref 3.8–10.5)
WBC # FLD AUTO: 5.34 K/UL — SIGNIFICANT CHANGE UP (ref 3.8–10.5)

## 2022-05-16 PROCEDURE — 99213 OFFICE O/P EST LOW 20 MIN: CPT

## 2022-05-16 RX ORDER — NEOMYCIN SULFATE 500 MG/1
500 TABLET ORAL
Qty: 6 | Refills: 0 | Status: DISCONTINUED | COMMUNITY
Start: 2022-01-13 | End: 2022-05-16

## 2022-05-16 RX ORDER — METRONIDAZOLE 250 MG/1
250 TABLET ORAL
Qty: 3 | Refills: 0 | Status: DISCONTINUED | COMMUNITY
Start: 2022-01-13 | End: 2022-05-16

## 2022-05-17 ENCOUNTER — NON-APPOINTMENT (OUTPATIENT)
Age: 57
End: 2022-05-17

## 2022-05-17 DIAGNOSIS — Z51.11 ENCOUNTER FOR ANTINEOPLASTIC CHEMOTHERAPY: ICD-10-CM

## 2022-05-17 DIAGNOSIS — R11.2 NAUSEA WITH VOMITING, UNSPECIFIED: ICD-10-CM

## 2022-05-17 LAB
CANCER AG19-9 SERPL-ACNC: 211 U/ML — HIGH
CEA SERPL-MCNC: 6.3 NG/ML — HIGH (ref 0–3.8)

## 2022-05-30 ENCOUNTER — OUTPATIENT (OUTPATIENT)
Dept: OUTPATIENT SERVICES | Facility: HOSPITAL | Age: 57
LOS: 1 days | End: 2022-05-30
Payer: MEDICAID

## 2022-05-30 DIAGNOSIS — Z51.11 ENCOUNTER FOR ANTINEOPLASTIC CHEMOTHERAPY: Chronic | ICD-10-CM

## 2022-05-30 DIAGNOSIS — Z11.52 ENCOUNTER FOR SCREENING FOR COVID-19: ICD-10-CM

## 2022-05-30 DIAGNOSIS — K92.2 GASTROINTESTINAL HEMORRHAGE, UNSPECIFIED: Chronic | ICD-10-CM

## 2022-05-30 DIAGNOSIS — Z98.890 OTHER SPECIFIED POSTPROCEDURAL STATES: Chronic | ICD-10-CM

## 2022-05-30 DIAGNOSIS — Z90.410 ACQUIRED TOTAL ABSENCE OF PANCREAS: Chronic | ICD-10-CM

## 2022-05-30 LAB — SARS-COV-2 RNA SPEC QL NAA+PROBE: SIGNIFICANT CHANGE UP

## 2022-05-30 PROCEDURE — U0005: CPT

## 2022-05-30 PROCEDURE — U0003: CPT

## 2022-05-30 PROCEDURE — C9803: CPT

## 2022-06-13 NOTE — REASON FOR VISIT
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

## 2022-06-13 NOTE — HISTORY OF PRESENT ILLNESS
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 \par \par Initially was admitted to [facility] on 12/18/18 with dizziness, found to have elevated LFTs. MRCP demonstrated a dilatation of the CBD. ERCP on 12/22/18 performed c/w ampullary mass with obstruction. Pathology c/w adenocarcinoma. Pt was discharged for out pt follow up with surg onc and med onc but was re-admitted on 1/5/19 with n/v, worsening abdominal pain, found to have severe sepsis with E coli bacteremia 2/2 acute cholangitis. \par 1/7: ERCP performed. Stent was removed from the biliary tree. One plastic stent placed\par 1/5 CT A/P Heterogeneity of the right hepatic lobe with questionable 1.3 cm hypoattenuating lesion in segment 7.\par 1/8 MRI Abd: No suspicious liver lesions. No abnormality is identified to correspond with questionable lesion identified on prior CT. 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  Taken to the OR for evacuation of hematoma, cessation of gastroduodenal artery bleeding with two hemostatic stitches, and placement of abthera VAC.  Required pressors in the SICU, then developed fever. \par 1/21-1/22: OR exploratory laparotomy, drainage of abdominal abscess, repair of abdominal wall hernia with Strattice mesh, b/ abdominal wall advancement flaps. Post-op patient remained intubated and off vasopressors. FENA calculated to be pre-renal. 500CC albumin bolus given, urine output improved. 1/22/19 attempted spontaneous breathing trials but pt hypoxic. Pt lactate uptrending, resuscitated w/IVF, and pt hypotensive requiring pressors. UCx growing candida, Diflucan changed to micafungin to r/o resistant organisms in the setting of worsening sepsis. \par 1/23/19, patient noted to have blood oozing from midline incision while hypotensive and on pressors. Peak pressures noted to be elevated on vent and abdomen increasingly distended. All staples removed and a large amount of clot evacuated from midline incision. 1U PRBC given. No active bleeding appreciated. Fascia remains closed. Wound packed with wet to dry dressing. Vitamin K given for elevated INR. \par 1/24-1/29: remained in SICU, started on TPN, diuresed, finally extubated. \par 2/1: septic shock again, pressors restarted\par 2/2-2/11: CT scan with peritonitis, new abscesses. Ab and Antifungals started. Wound with increase in drainage. Octreotide started for increased output \par 2/12: drainage of abscesses by IR.  \par 2/18: Wound was partially closed by plastics with interrupted sutures and an ostomy appliance was placed, VAC removed.\par 3/1/19. d/c home \par \par 4/30-10/21/19: s/p 8 cycles Gemzar/Xeloda \par 10/10/19: CT CAP: no evidence of mets\par 1/20-11/2020: prolonged stay in 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 s/p embolization\par 3/14/22: CT A/P: nodular hepatic contour, stomach is distended, improvement in hematoma  [de-identified] : adenocarcinoma [de-identified] : CA [id number] [de-identified] : FINAL PATHOLOGY:\par -Invasive adenocarcinoma of the duodenal ampulla (Tumor size: 2.5 x 1.5 x 0.8 cm); Tumor stage: pT3b pN1\par -Vascular and perineural invasion identified\par -1/24 lymph nodes involved by adenocarcinoma\par -Negative margins\par -S/p cholecystectomy ; acute cholecystitis with cholelithiasis \par -Bile duct margin with acute inflammation and focal atypia consistent with low grade dysplasia. \par  [FreeTextEntry1] : surveillance  [de-identified] : 4 mos post op and still with abdominal discomfort. weight is stable. energy level is fair. wants mediport removed

## 2022-06-22 LAB
ALBUMIN SERPL ELPH-MCNC: 4.3 G/DL
ALP BLD-CCNC: 154 U/L
ALT SERPL-CCNC: 44 U/L
ANION GAP SERPL CALC-SCNC: 13 MMOL/L
APTT BLD: 32.1 SEC
AST SERPL-CCNC: 60 U/L
BASOPHILS # BLD AUTO: 0.03 K/UL
BASOPHILS NFR BLD AUTO: 0.6 %
BILIRUB SERPL-MCNC: 1 MG/DL
BUN SERPL-MCNC: 7 MG/DL
CALCIUM SERPL-MCNC: 10.3 MG/DL
CHLORIDE SERPL-SCNC: 100 MMOL/L
CO2 SERPL-SCNC: 24 MMOL/L
CREAT SERPL-MCNC: 0.59 MG/DL
EGFR: 106 ML/MIN/1.73M2
EOSINOPHIL # BLD AUTO: 0.12 K/UL
EOSINOPHIL NFR BLD AUTO: 2.3 %
GLUCOSE SERPL-MCNC: 145 MG/DL
HCT VFR BLD CALC: 35.7 %
HGB BLD-MCNC: 11.5 G/DL
IMM GRANULOCYTES NFR BLD AUTO: 0.2 %
INR PPP: 1.17 RATIO
LYMPHOCYTES # BLD AUTO: 2.19 K/UL
LYMPHOCYTES NFR BLD AUTO: 42.4 %
MAN DIFF?: NORMAL
MCHC RBC-ENTMCNC: 25.2 PG
MCHC RBC-ENTMCNC: 32.2 GM/DL
MCV RBC AUTO: 78.3 FL
MONOCYTES # BLD AUTO: 0.61 K/UL
MONOCYTES NFR BLD AUTO: 11.8 %
NEUTROPHILS # BLD AUTO: 2.21 K/UL
NEUTROPHILS NFR BLD AUTO: 42.7 %
PLATELET # BLD AUTO: 102 K/UL
POTASSIUM SERPL-SCNC: 5.2 MMOL/L
PROT SERPL-MCNC: 7.7 G/DL
PT BLD: 13.8 SEC
RBC # BLD: 4.56 M/UL
RBC # FLD: 14.3 %
SODIUM SERPL-SCNC: 137 MMOL/L
WBC # FLD AUTO: 5.17 K/UL

## 2022-06-24 LAB — CANCER AG19-9 SERPL-ACNC: 488 U/ML

## 2022-06-30 ENCOUNTER — RESULT REVIEW (OUTPATIENT)
Age: 57
End: 2022-06-30

## 2022-06-30 ENCOUNTER — APPOINTMENT (OUTPATIENT)
Dept: HEMATOLOGY ONCOLOGY | Facility: CLINIC | Age: 57
End: 2022-06-30

## 2022-06-30 ENCOUNTER — OUTPATIENT (OUTPATIENT)
Dept: OUTPATIENT SERVICES | Facility: HOSPITAL | Age: 57
LOS: 1 days | Discharge: HOME | End: 2022-06-30

## 2022-06-30 VITALS
DIASTOLIC BLOOD PRESSURE: 68 MMHG | TEMPERATURE: 97.2 F | SYSTOLIC BLOOD PRESSURE: 115 MMHG | HEIGHT: 60 IN | RESPIRATION RATE: 16 BRPM | BODY MASS INDEX: 20.86 KG/M2 | HEART RATE: 79 BPM | WEIGHT: 106.26 LBS | OXYGEN SATURATION: 99 %

## 2022-06-30 DIAGNOSIS — Z98.890 OTHER SPECIFIED POSTPROCEDURAL STATES: Chronic | ICD-10-CM

## 2022-06-30 DIAGNOSIS — Z90.410 ACQUIRED TOTAL ABSENCE OF PANCREAS: Chronic | ICD-10-CM

## 2022-06-30 DIAGNOSIS — Z51.11 ENCOUNTER FOR ANTINEOPLASTIC CHEMOTHERAPY: Chronic | ICD-10-CM

## 2022-06-30 DIAGNOSIS — K92.2 GASTROINTESTINAL HEMORRHAGE, UNSPECIFIED: Chronic | ICD-10-CM

## 2022-06-30 DIAGNOSIS — C24.1 MALIGNANT NEOPLASM OF AMPULLA OF VATER: ICD-10-CM

## 2022-06-30 PROCEDURE — 99215 OFFICE O/P EST HI 40 MIN: CPT

## 2022-06-30 RX ORDER — SITAGLIPTIN 100 MG/1
100 TABLET, FILM COATED ORAL
Qty: 30 | Refills: 0 | Status: ACTIVE | COMMUNITY
Start: 2022-05-03

## 2022-06-30 RX ORDER — ACETAMINOPHEN EXTRA STRENGTH 500 MG/1
500 TABLET ORAL
Qty: 120 | Refills: 0 | Status: COMPLETED | COMMUNITY
Start: 2022-05-03

## 2022-06-30 RX ORDER — MULTIVITAMIN WITH FOLIC ACID 400 MCG
TABLET ORAL
Qty: 30 | Refills: 0 | Status: COMPLETED | COMMUNITY
Start: 2021-12-22

## 2022-06-30 RX ORDER — ATORVASTATIN CALCIUM 10 MG/1
10 TABLET, FILM COATED ORAL
Qty: 30 | Refills: 0 | Status: ACTIVE | COMMUNITY
Start: 2021-12-22

## 2022-06-30 RX ORDER — ERGOCALCIFEROL 1.25 MG/1
1.25 MG CAPSULE, LIQUID FILLED ORAL
Qty: 4 | Refills: 0 | Status: ACTIVE | COMMUNITY
Start: 2022-05-03

## 2022-06-30 RX ORDER — POLYETHYLENE GLYCOL 3350 17 G/17G
17 POWDER, FOR SOLUTION ORAL
Qty: 510 | Refills: 0 | Status: COMPLETED | COMMUNITY
Start: 2022-05-03

## 2022-06-30 NOTE — REASON FOR VISIT
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

## 2022-06-30 NOTE — REVIEW OF SYSTEMS
[Fatigue] : fatigue [Recent Change In Weight] : ~T recent weight change [Cough] : cough [Abdominal Pain] : abdominal pain [Joint Pain] : joint pain [Anxiety] : anxiety [Negative] : Allergic/Immunologic

## 2022-06-30 NOTE — HISTORY OF PRESENT ILLNESS
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 \par \par Initially was admitted to [facility] on 12/18/18 with dizziness, found to have elevated LFTs. MRCP demonstrated a dilatation of the CBD. ERCP on 12/22/18 performed c/w ampullary mass with obstruction. Pathology c/w adenocarcinoma. Pt was discharged for out pt follow up with surg onc and med onc but was re-admitted on 1/5/19 with n/v, worsening abdominal pain, found to have severe sepsis with E coli bacteremia 2/2 acute cholangitis. \par \par 1/5/19: CT A/P Heterogeneity of the right hepatic lobe with questionable 1.3 cm hypoattenuating lesion in segment 7.\par 1/7/19: ERCP performed. Stent was removed from the biliary tree. One plastic stent placed\par 1/8/19: MRI Abd: No suspicious liver lesions. No abnormality is identified to correspond with questionable lesion identified on prior CT. 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  Taken to the OR for evacuation of hematoma, cessation of gastroduodenal artery bleeding with two hemostatic stitches, and placement of abthera VAC.  Required pressors in the SICU, then developed fever. \par 1/21-1/22/19: OR exploratory laparotomy, drainage of abdominal abscess, repair of abdominal wall hernia with Strattice mesh, b/ abdominal wall advancement flaps. Post-op patient remained intubated and off vasopressors. FENA calculated to be pre-renal. 500 CC albumin bolus given, urine output improved. 1/22/19 attempted spontaneous breathing trials but pt hypoxic. Pt lactate uptrend, resuscitated w/IVF, and pt hypotensive requiring pressors. UCx growing candida, Diflucan changed to micafungin to r/o resistant organisms in the setting of worsening sepsis. \par 1/23/19: patient noted to have blood oozing from midline incision while hypotensive and on pressors. Peak pressures noted to be elevated on vent and abdomen increasingly distended. All staples removed and a large amount of clot evacuated from midline incision. 1U PRBC given. No active bleeding appreciated. Fascia remains closed. Wound packed with wet to dry dressing. Vitamin K given for elevated INR. \par 1/24-1/29/19: remained in SICU, started on TPN, diuresed, finally extubated. \par 2/1/19: septic shock again, pressors restarted\par 2/2-2/11/19: CT scan with peritonitis, new abscesses. Ab and Antifungals started. Wound with increase in drainage. Octreotide started for increased output \par 2/12/19: drainage of abscesses by IR.  \par 2/18/19: Wound was partially closed by plastics with interrupted sutures and an ostomy appliance was placed, VAC removed.\par 3/1/19. d/c home \par 4/30-10/21/19: s/p 8 cycles Gemzar/Xeloda chemotherapy\par 10/10/19: CT CAP: no evidence of mets\par 1/20-11/2020: prolonged stay in [facility] due to COVID \par 11/6/20: CT CAP: 2 mildly enlarged mesenteric LN, attention in f/u\par 5/19/21: CT CAP revealed stable exam, unchanged cardiophrenic and mesenteric root lymph nodes\par 12/7/21: CT CAP: stable disease\par 1/21/22: ventral hernia repair \par 3/10/22: CT abd/pel: significant interval improvement of previously described rectus/anterior abdominal wall hematoma, previously described focal hematoma at the left anterior abdominal wall is decreased in size [de-identified] : adenocarcinoma [de-identified] : CA [id number] 34 [de-identified] : FINAL PATHOLOGY:\par -Invasive adenocarcinoma of the duodenal ampulla (Tumor size: 2.5 x 1.5 x 0.8 cm); Tumor stage: pT3b pN1\par -Vascular and perineural invasion identified\par -1/24 lymph nodes involved by adenocarcinoma\par -Negative margins\par -S/p cholecystectomy ; acute cholecystitis with cholelithiasis \par -Bile duct margin with acute inflammation and focal atypia consistent with low grade dysplasia. \par  [FreeTextEntry1] : surveillance  [de-identified] : Patient presented to the office for an urgent follow-up visit due to development of the following symptoms: progressively worsening dry cough x 1 week, new back pain, increased fatigue/weakness. She completed lab work that noted recent increase of her CA 19-9, which also concerns the daughter. Patient admitted to decreased PO intake, weight loss but bowel movements remain regular (normal in color, consistency, frequency).

## 2022-06-30 NOTE — PHYSICAL EXAM
[Restricted in physically strenuous activity but ambulatory and able to carry out work of a light or sedentary nature] : Status 1- Restricted in physically strenuous activity but ambulatory and able to carry out work of a light or sedentary nature, e.g., light house work, office work [Normal] : affect appropriate [de-identified] : ambulating with cane

## 2022-07-01 ENCOUNTER — APPOINTMENT (OUTPATIENT)
Dept: NUCLEAR MEDICINE | Facility: IMAGING CENTER | Age: 57
End: 2022-07-01

## 2022-07-01 ENCOUNTER — OUTPATIENT (OUTPATIENT)
Dept: OUTPATIENT SERVICES | Facility: HOSPITAL | Age: 57
LOS: 1 days | End: 2022-07-01
Payer: MEDICAID

## 2022-07-01 DIAGNOSIS — Z90.410 ACQUIRED TOTAL ABSENCE OF PANCREAS: Chronic | ICD-10-CM

## 2022-07-01 DIAGNOSIS — Z98.890 OTHER SPECIFIED POSTPROCEDURAL STATES: Chronic | ICD-10-CM

## 2022-07-01 DIAGNOSIS — Z51.11 ENCOUNTER FOR ANTINEOPLASTIC CHEMOTHERAPY: Chronic | ICD-10-CM

## 2022-07-01 DIAGNOSIS — C24.1 MALIGNANT NEOPLASM OF AMPULLA OF VATER: ICD-10-CM

## 2022-07-01 DIAGNOSIS — K92.2 GASTROINTESTINAL HEMORRHAGE, UNSPECIFIED: Chronic | ICD-10-CM

## 2022-07-01 DIAGNOSIS — Z00.8 ENCOUNTER FOR OTHER GENERAL EXAMINATION: ICD-10-CM

## 2022-07-01 PROCEDURE — 78815 PET IMAGE W/CT SKULL-THIGH: CPT | Mod: 26,PS

## 2022-07-01 PROCEDURE — A9552: CPT

## 2022-07-01 PROCEDURE — 78815 PET IMAGE W/CT SKULL-THIGH: CPT

## 2022-07-05 ENCOUNTER — EMERGENCY (EMERGENCY)
Facility: HOSPITAL | Age: 57
LOS: 1 days | Discharge: HOME | End: 2022-07-05
Attending: EMERGENCY MEDICINE | Admitting: EMERGENCY MEDICINE

## 2022-07-05 VITALS
OXYGEN SATURATION: 100 % | DIASTOLIC BLOOD PRESSURE: 57 MMHG | HEART RATE: 84 BPM | HEIGHT: 59 IN | SYSTOLIC BLOOD PRESSURE: 118 MMHG | TEMPERATURE: 98 F | RESPIRATION RATE: 20 BRPM

## 2022-07-05 DIAGNOSIS — Z51.11 ENCOUNTER FOR ANTINEOPLASTIC CHEMOTHERAPY: Chronic | ICD-10-CM

## 2022-07-05 DIAGNOSIS — Z90.410 ACQUIRED TOTAL ABSENCE OF PANCREAS: Chronic | ICD-10-CM

## 2022-07-05 DIAGNOSIS — K92.2 GASTROINTESTINAL HEMORRHAGE, UNSPECIFIED: Chronic | ICD-10-CM

## 2022-07-05 DIAGNOSIS — Z98.890 OTHER SPECIFIED POSTPROCEDURAL STATES: Chronic | ICD-10-CM

## 2022-07-05 PROCEDURE — 99284 EMERGENCY DEPT VISIT MOD MDM: CPT | Mod: 25

## 2022-07-05 PROCEDURE — 93010 ELECTROCARDIOGRAM REPORT: CPT

## 2022-07-05 NOTE — CHART NOTE - NSCHARTNOTEFT_GEN_A_CORE
57 F w/ Stage III ampullary ca s/p resection in Jan 2019 prolonged hospital stay with multiple complications, adjuvant chemotherapy with Gemzar/xeloda completed 10/21/19, on surveillance since then. Currently NOT on chemo. Pt had recent surgery Jan 2022 to repair a large hernia which was complicated by post op bleed. Repeat CT scans without evidence of disease but CA 19-9 noted to be increasing significantly. PET/CT obtained, results still pending.     Pt's daughter called today c/o hemoptysis, fatigue and back pain.     Recommendation   - work up as per symptoms above, imaging, blood work  - house onc will follow and further recs to be made once results are back

## 2022-07-06 VITALS
HEART RATE: 87 BPM | DIASTOLIC BLOOD PRESSURE: 64 MMHG | SYSTOLIC BLOOD PRESSURE: 114 MMHG | OXYGEN SATURATION: 100 % | TEMPERATURE: 98 F | RESPIRATION RATE: 16 BRPM

## 2022-07-06 LAB
ALBUMIN SERPL ELPH-MCNC: 4.1 G/DL — SIGNIFICANT CHANGE UP (ref 3.3–5)
ALP SERPL-CCNC: 143 U/L — HIGH (ref 40–120)
ALT FLD-CCNC: 22 U/L — SIGNIFICANT CHANGE UP (ref 4–33)
ANION GAP SERPL CALC-SCNC: 11 MMOL/L — SIGNIFICANT CHANGE UP (ref 7–14)
APTT BLD: 32.2 SEC — SIGNIFICANT CHANGE UP (ref 27–36.3)
AST SERPL-CCNC: 42 U/L — HIGH (ref 4–32)
B PERT DNA SPEC QL NAA+PROBE: SIGNIFICANT CHANGE UP
B PERT+PARAPERT DNA PNL SPEC NAA+PROBE: SIGNIFICANT CHANGE UP
BASE EXCESS BLDV CALC-SCNC: 3.1 MMOL/L — HIGH (ref -2–3)
BASOPHILS # BLD AUTO: 0.02 K/UL — SIGNIFICANT CHANGE UP (ref 0–0.2)
BASOPHILS NFR BLD AUTO: 0.3 % — SIGNIFICANT CHANGE UP (ref 0–2)
BILIRUB SERPL-MCNC: 1 MG/DL — SIGNIFICANT CHANGE UP (ref 0.2–1.2)
BLOOD GAS VENOUS COMPREHENSIVE RESULT: SIGNIFICANT CHANGE UP
BORDETELLA PARAPERTUSSIS (RAPRVP): SIGNIFICANT CHANGE UP
BUN SERPL-MCNC: 11 MG/DL — SIGNIFICANT CHANGE UP (ref 7–23)
C PNEUM DNA SPEC QL NAA+PROBE: SIGNIFICANT CHANGE UP
CALCIUM SERPL-MCNC: 9.8 MG/DL — SIGNIFICANT CHANGE UP (ref 8.4–10.5)
CHLORIDE BLDV-SCNC: 102 MMOL/L — SIGNIFICANT CHANGE UP (ref 96–108)
CHLORIDE SERPL-SCNC: 99 MMOL/L — SIGNIFICANT CHANGE UP (ref 98–107)
CO2 BLDV-SCNC: 30.5 MMOL/L — HIGH (ref 22–26)
CO2 SERPL-SCNC: 26 MMOL/L — SIGNIFICANT CHANGE UP (ref 22–31)
CREAT SERPL-MCNC: 0.61 MG/DL — SIGNIFICANT CHANGE UP (ref 0.5–1.3)
EGFR: 105 ML/MIN/1.73M2 — SIGNIFICANT CHANGE UP
EOSINOPHIL # BLD AUTO: 0.06 K/UL — SIGNIFICANT CHANGE UP (ref 0–0.5)
EOSINOPHIL NFR BLD AUTO: 0.9 % — SIGNIFICANT CHANGE UP (ref 0–6)
FLUAV SUBTYP SPEC NAA+PROBE: SIGNIFICANT CHANGE UP
FLUBV RNA SPEC QL NAA+PROBE: SIGNIFICANT CHANGE UP
GAS PNL BLDV: 135 MMOL/L — LOW (ref 136–145)
GLUCOSE BLDV-MCNC: 95 MG/DL — SIGNIFICANT CHANGE UP (ref 70–99)
GLUCOSE SERPL-MCNC: 94 MG/DL — SIGNIFICANT CHANGE UP (ref 70–99)
HADV DNA SPEC QL NAA+PROBE: SIGNIFICANT CHANGE UP
HCO3 BLDV-SCNC: 29 MMOL/L — SIGNIFICANT CHANGE UP (ref 22–29)
HCOV 229E RNA SPEC QL NAA+PROBE: SIGNIFICANT CHANGE UP
HCOV HKU1 RNA SPEC QL NAA+PROBE: SIGNIFICANT CHANGE UP
HCOV NL63 RNA SPEC QL NAA+PROBE: SIGNIFICANT CHANGE UP
HCOV OC43 RNA SPEC QL NAA+PROBE: SIGNIFICANT CHANGE UP
HCT VFR BLD CALC: 31.5 % — LOW (ref 34.5–45)
HCT VFR BLDA CALC: 34 % — LOW (ref 34.5–46.5)
HGB BLD CALC-MCNC: 11.2 G/DL — LOW (ref 11.5–15.5)
HGB BLD-MCNC: 10.6 G/DL — LOW (ref 11.5–15.5)
HMPV RNA SPEC QL NAA+PROBE: SIGNIFICANT CHANGE UP
HPIV1 RNA SPEC QL NAA+PROBE: SIGNIFICANT CHANGE UP
HPIV2 RNA SPEC QL NAA+PROBE: SIGNIFICANT CHANGE UP
HPIV3 RNA SPEC QL NAA+PROBE: SIGNIFICANT CHANGE UP
HPIV4 RNA SPEC QL NAA+PROBE: SIGNIFICANT CHANGE UP
IANC: 3.11 K/UL — SIGNIFICANT CHANGE UP (ref 1.8–7.4)
IMM GRANULOCYTES NFR BLD AUTO: 0.3 % — SIGNIFICANT CHANGE UP (ref 0–1.5)
INR BLD: 1.47 RATIO — HIGH (ref 0.88–1.16)
LACTATE BLDV-MCNC: 1.6 MMOL/L — SIGNIFICANT CHANGE UP (ref 0.5–2)
LYMPHOCYTES # BLD AUTO: 2.39 K/UL — SIGNIFICANT CHANGE UP (ref 1–3.3)
LYMPHOCYTES # BLD AUTO: 37.6 % — SIGNIFICANT CHANGE UP (ref 13–44)
M PNEUMO DNA SPEC QL NAA+PROBE: SIGNIFICANT CHANGE UP
MCHC RBC-ENTMCNC: 24.8 PG — LOW (ref 27–34)
MCHC RBC-ENTMCNC: 33.7 GM/DL — SIGNIFICANT CHANGE UP (ref 32–36)
MCV RBC AUTO: 73.6 FL — LOW (ref 80–100)
MONOCYTES # BLD AUTO: 0.75 K/UL — SIGNIFICANT CHANGE UP (ref 0–0.9)
MONOCYTES NFR BLD AUTO: 11.8 % — SIGNIFICANT CHANGE UP (ref 2–14)
NEUTROPHILS # BLD AUTO: 3.11 K/UL — SIGNIFICANT CHANGE UP (ref 1.8–7.4)
NEUTROPHILS NFR BLD AUTO: 49.1 % — SIGNIFICANT CHANGE UP (ref 43–77)
NRBC # BLD: 0 /100 WBCS — SIGNIFICANT CHANGE UP
NRBC # FLD: 0 K/UL — SIGNIFICANT CHANGE UP
NT-PROBNP SERPL-SCNC: 44 PG/ML — SIGNIFICANT CHANGE UP
PCO2 BLDV: 49 MMHG — HIGH (ref 39–42)
PH BLDV: 7.38 — SIGNIFICANT CHANGE UP (ref 7.32–7.43)
PLATELET # BLD AUTO: 142 K/UL — LOW (ref 150–400)
PO2 BLDV: 31 MMHG — SIGNIFICANT CHANGE UP
POTASSIUM BLDV-SCNC: 4.7 MMOL/L — SIGNIFICANT CHANGE UP (ref 3.5–5.1)
POTASSIUM SERPL-MCNC: 4.6 MMOL/L — SIGNIFICANT CHANGE UP (ref 3.5–5.3)
POTASSIUM SERPL-SCNC: 4.6 MMOL/L — SIGNIFICANT CHANGE UP (ref 3.5–5.3)
PROT SERPL-MCNC: 8.3 G/DL — SIGNIFICANT CHANGE UP (ref 6–8.3)
PROTHROM AB SERPL-ACNC: 17.1 SEC — HIGH (ref 10.5–13.4)
RAPID RVP RESULT: SIGNIFICANT CHANGE UP
RBC # BLD: 4.28 M/UL — SIGNIFICANT CHANGE UP (ref 3.8–5.2)
RBC # FLD: 13.6 % — SIGNIFICANT CHANGE UP (ref 10.3–14.5)
RSV RNA SPEC QL NAA+PROBE: SIGNIFICANT CHANGE UP
RV+EV RNA SPEC QL NAA+PROBE: SIGNIFICANT CHANGE UP
SAO2 % BLDV: 46.4 % — SIGNIFICANT CHANGE UP
SARS-COV-2 RNA SPEC QL NAA+PROBE: SIGNIFICANT CHANGE UP
SODIUM SERPL-SCNC: 136 MMOL/L — SIGNIFICANT CHANGE UP (ref 135–145)
TROPONIN T, HIGH SENSITIVITY RESULT: 9 NG/L — SIGNIFICANT CHANGE UP
TROPONIN T, HIGH SENSITIVITY RESULT: 9 NG/L — SIGNIFICANT CHANGE UP
WBC # BLD: 6.35 K/UL — SIGNIFICANT CHANGE UP (ref 3.8–10.5)
WBC # FLD AUTO: 6.35 K/UL — SIGNIFICANT CHANGE UP (ref 3.8–10.5)

## 2022-07-06 PROCEDURE — 71275 CT ANGIOGRAPHY CHEST: CPT | Mod: 26,MA

## 2022-07-06 PROCEDURE — 71045 X-RAY EXAM CHEST 1 VIEW: CPT | Mod: 26

## 2022-07-06 RX ORDER — ACETAMINOPHEN 500 MG
650 TABLET ORAL ONCE
Refills: 0 | Status: COMPLETED | OUTPATIENT
Start: 2022-07-06 | End: 2022-07-06

## 2022-07-06 RX ORDER — CIPROFLOXACIN LACTATE 400MG/40ML
1 VIAL (ML) INTRAVENOUS
Qty: 5 | Refills: 0
Start: 2022-07-06 | End: 2022-07-10

## 2022-07-06 RX ORDER — AZITHROMYCIN 500 MG/1
500 TABLET, FILM COATED ORAL ONCE
Refills: 0 | Status: COMPLETED | OUTPATIENT
Start: 2022-07-06 | End: 2022-07-06

## 2022-07-06 RX ORDER — SODIUM CHLORIDE 9 MG/ML
1000 INJECTION INTRAMUSCULAR; INTRAVENOUS; SUBCUTANEOUS ONCE
Refills: 0 | Status: COMPLETED | OUTPATIENT
Start: 2022-07-06 | End: 2022-07-06

## 2022-07-06 RX ORDER — CEFTRIAXONE 500 MG/1
1000 INJECTION, POWDER, FOR SOLUTION INTRAMUSCULAR; INTRAVENOUS ONCE
Refills: 0 | Status: COMPLETED | OUTPATIENT
Start: 2022-07-06 | End: 2022-07-06

## 2022-07-06 RX ADMIN — Medication 100 MILLIGRAM(S): at 05:08

## 2022-07-06 RX ADMIN — SODIUM CHLORIDE 1000 MILLILITER(S): 9 INJECTION INTRAMUSCULAR; INTRAVENOUS; SUBCUTANEOUS at 01:45

## 2022-07-06 RX ADMIN — Medication 650 MILLIGRAM(S): at 05:08

## 2022-07-06 RX ADMIN — AZITHROMYCIN 255 MILLIGRAM(S): 500 TABLET, FILM COATED ORAL at 03:35

## 2022-07-06 RX ADMIN — CEFTRIAXONE 100 MILLIGRAM(S): 500 INJECTION, POWDER, FOR SOLUTION INTRAMUSCULAR; INTRAVENOUS at 01:45

## 2022-07-06 NOTE — ED ADULT NURSE REASSESSMENT NOTE - NS ED NURSE REASSESS COMMENT FT1
pt. remains A&Ox4, awake and resting. pt. offers no new complaints at this time. no acute distress noted. respirations even and unlabored. VS as noted. amb sat noted to stay at 100% upon ambulation. pending dispo

## 2022-07-06 NOTE — ED PROVIDER NOTE - PHYSICAL EXAMINATION
PHYSICAL EXAM:  CONSTITUTIONAL: Tachypneic, awake, alert, oriented to person, place, time/situation. Appears uncomfortable.  HEAD: Atraumatic  EYES: Clear bilaterally, pupils equal, round and reactive to light.  ENMT: Airway patent, Nasal mucosa clear. Mouth with normal mucosa. Uvula is midline.   CARDIAC: Normal rate, regular rhythm. +S1/S2. No murmurs, rubs or gallops.  RESPIRATORY: Breathing tachypneic. Satting 100% on room air. Breath sounds clear and equal bilaterally but coughing.  ABDOMEN:  Soft, nontender, nondistended. No rebound tenderness or guarding.  NEUROLOGICAL: Alert and oriented, no focal deficits, no motor or sensory deficits.   EXTREMITIES: No lower extremity B/L.   SKIN: Skin warm and dry. No evidence of rashes or lesions.

## 2022-07-06 NOTE — ED PROVIDER NOTE - NS ED ROS FT
ROS:  -Constitutional: Denies fever  -Head: Denies headache  -Eyes: Denies blurry vision  -Cardiovascular: +chest pain  -Pulmonary: +shortness of breath  -Gastrointestinal: Denies nausea or diarrhea  -Genitourinary: Denies dysuria  -Skin: Denies new rashes  -Neuro: Denies numbness or tingling

## 2022-07-06 NOTE — ED PROVIDER NOTE - ATTENDING CONTRIBUTION TO CARE
58 y/o F with h/o asthma, GERD, DM on oral meds, ampullary ca s/p whipple and chemo (2018) here with 3 days of cough associated with left sided chest pain.  Pt endorses hemoptysis, pain to left sided of chest as well as abdomen with coughing only.  No fever, n/v/d, urinary sxs, leg pain or swelling.  No tob use or recent travel/illness.  No known sick contacts.  Well appearing but coughing frequently with associated mild distress with coughing fits, lying in stretcher, awake and alert, nontoxic.  AF/VSS.  No hypoxia.  Lungs cta bl no wheezing.  Cards nl S1/S2, RRR, no MRG.  Abd soft ntnd.  No pedal edema or calf tenderness; no unilateral calf swelling.  Sxs are concerning for infectious etiology - viral uri vs pna; also consider PE in setting of hemoptysis and h/o malignancy; low suspicion for acs, do not suspect aortic catastrophe.  Plan for labs incl trop, ekg, cta chest r/o pe, rvp, reassess.

## 2022-07-06 NOTE — ED PROVIDER NOTE - PROGRESS NOTE DETAILS
Michele CAMACHO: CTA neg for PE, YUNI pna treated for CAP.  Labs reviewed and unremarkable, pt afebrile here.  She cont to c/o cough and chest/abd pain with coughing.  Trop neg x 2, EKG normal.  d/w patient and daughter re: inpt vs outpatient therapy.  Pt in no resp distress, resting and ambulatory sat and resp status stable.  She is agreeable to outpatient therapy.  Will dc with oral abx, close outpt follow-up in 1 week for repeat CXR to monitor resolution of pna.

## 2022-07-06 NOTE — ED PROVIDER NOTE - NSFOLLOWUPINSTRUCTIONS_ED_ALL_ED_FT
You were seen in the emergency department for cough.  You were found to have pneumonia.  Drink plenty of fluids.  You were started on antibiotics - take levaquin 750mg once a day for 5 days.  You can take ibuprofen 600mg every 6 hours or Tylenol 650mg every 4 hours as needed for pain or fever.  Follow-up with your PMD in 1 week for repeat chest x-ray to evaluate for resolution of your pneumonia.  Return to the emergency department for any new or worsening symptoms.

## 2022-07-06 NOTE — ED ADULT NURSE NOTE - NSIMPLEMENTINTERV_GEN_ALL_ED
Implemented All Universal Safety Interventions:  [place] to call system. Call bell, personal items and telephone within reach. Instruct patient to call for assistance. Room bathroom lighting operational. Non-slip footwear when patient is off stretcher. Physically safe environment: no spills, clutter or unnecessary equipment. Stretcher in lowest position, wheels locked, appropriate side rails in place.

## 2022-07-06 NOTE — ED ADULT NURSE NOTE - OBJECTIVE STATEMENT
Break coverage- Pt received into spot #6. AAOx4, primarily Lithuanian speaking, as per family member pt c/o hemoptysis and generalized chest pain. Denies fever/chills. Pt on chemo for cancer of bile duct. NSR on cardiac monitor. MD rodriguez performed. #20g IV placed to right ac, labs drawn and sent. no acute distress. Pt taken to x-ray. Awaiting further plan of care.

## 2022-07-06 NOTE — ED PROVIDER NOTE - PATIENT PORTAL LINK FT
You can access the FollowMyHealth Patient Portal offered by [facility] by registering at the following website: [email or web address]. By joining [facility]’s FollowMyHealth portal, you will also be able to view your health information using other applications (apps) compatible with our system.

## 2022-07-06 NOTE — ED PROVIDER NOTE - CLINICAL SUMMARY MEDICAL DECISION MAKING FREE TEXT BOX
58 y/o F with h/o ampullary ca last chemo in 2019 with 2 days cough, chest pain, fatigue.  (+)hemoptysis.  Consider pna, r/o PE given hemoptysis and h/o ca; low suspicion for acs, ptx.  Plan for labs, ekg, cxr, cta chest, reassess.

## 2022-07-06 NOTE — ED PROVIDER NOTE - OBJECTIVE STATEMENT
57 year old female with PMH cancer of ampulla of Vater (2018 s/p surgery and chemo) 57 year old female with PMH cancer of ampulla of Vater (2018 s/p surgery and chemo), asthma, DM, hx whipple, presents with left sided chest pain which is constant achey and worse with movement associated with cough for the past 3 days, states she has had hemoptysis with dark specks. Also reports shortness of breath. Denies fever, chills, nausea, vomiting, diarrhea, abdominal pain. Denies swelling in legs, no history of clots.

## 2022-07-06 NOTE — ED PROVIDER NOTE - CHIEF COMPLAINT
The patient is a 57y Female complaining of  The patient is a 57y Female complaining of cough, chest pain

## 2022-07-06 NOTE — END OF VISIT
[>50% of Time Spent on Counseling and Coordination of Care for  ___] : Greater than 50% of the encounter time was spent on counseling and coordination of care for [unfilled] Gen: Alert, NAD, sitting comfortably in stretcher  Head: NC, AT, PERRL, EOMI, normal lids/conjunctiva  ENT: B TM WNL, patent oropharynx without erythema/exudate, uvula midline  Neck: +supple, no tenderness/meningismus/JVD, +Trachea midline  Pulm: Bilateral BS, normal resp effort, no wheeze/stridor/retractions  CV: RRR, no M/R/G, +dist pulses  Abd: soft, NT/ND, +BS, no hepatosplenomegaly  Mskel: no edema/erythema/cyanosis  Skin: + rash palms soles back of throat  Neuro: grossly intact

## 2022-07-11 LAB
CULTURE RESULTS: SIGNIFICANT CHANGE UP
CULTURE RESULTS: SIGNIFICANT CHANGE UP
SPECIMEN SOURCE: SIGNIFICANT CHANGE UP
SPECIMEN SOURCE: SIGNIFICANT CHANGE UP

## 2022-07-14 ENCOUNTER — NON-APPOINTMENT (OUTPATIENT)
Age: 57
End: 2022-07-14

## 2022-07-18 ENCOUNTER — INPATIENT (INPATIENT)
Facility: HOSPITAL | Age: 57
LOS: 3 days | Discharge: HOME | End: 2022-07-22
Attending: STUDENT IN AN ORGANIZED HEALTH CARE EDUCATION/TRAINING PROGRAM | Admitting: STUDENT IN AN ORGANIZED HEALTH CARE EDUCATION/TRAINING PROGRAM

## 2022-07-18 VITALS
HEIGHT: 59 IN | DIASTOLIC BLOOD PRESSURE: 57 MMHG | RESPIRATION RATE: 18 BRPM | HEART RATE: 80 BPM | TEMPERATURE: 98 F | SYSTOLIC BLOOD PRESSURE: 97 MMHG | OXYGEN SATURATION: 100 %

## 2022-07-18 DIAGNOSIS — Z98.890 OTHER SPECIFIED POSTPROCEDURAL STATES: Chronic | ICD-10-CM

## 2022-07-18 DIAGNOSIS — Z51.11 ENCOUNTER FOR ANTINEOPLASTIC CHEMOTHERAPY: Chronic | ICD-10-CM

## 2022-07-18 DIAGNOSIS — K92.2 GASTROINTESTINAL HEMORRHAGE, UNSPECIFIED: Chronic | ICD-10-CM

## 2022-07-18 DIAGNOSIS — Z90.410 ACQUIRED TOTAL ABSENCE OF PANCREAS: Chronic | ICD-10-CM

## 2022-07-18 LAB
ALBUMIN SERPL ELPH-MCNC: 3.4 G/DL — SIGNIFICANT CHANGE UP (ref 3.3–5)
ALP SERPL-CCNC: 118 U/L — SIGNIFICANT CHANGE UP (ref 40–120)
ALT FLD-CCNC: 22 U/L — SIGNIFICANT CHANGE UP (ref 4–33)
ANION GAP SERPL CALC-SCNC: 11 MMOL/L — SIGNIFICANT CHANGE UP (ref 7–14)
ANION GAP SERPL CALC-SCNC: 12 MMOL/L — SIGNIFICANT CHANGE UP (ref 7–14)
AST SERPL-CCNC: 65 U/L — HIGH (ref 4–32)
BASE EXCESS BLDV CALC-SCNC: -0.3 MMOL/L — SIGNIFICANT CHANGE UP (ref -2–3)
BASOPHILS # BLD AUTO: 0.05 K/UL — SIGNIFICANT CHANGE UP (ref 0–0.2)
BASOPHILS NFR BLD AUTO: 0.7 % — SIGNIFICANT CHANGE UP (ref 0–2)
BILIRUB SERPL-MCNC: 0.6 MG/DL — SIGNIFICANT CHANGE UP (ref 0.2–1.2)
BLOOD GAS VENOUS COMPREHENSIVE RESULT: SIGNIFICANT CHANGE UP
BUN SERPL-MCNC: 7 MG/DL — SIGNIFICANT CHANGE UP (ref 7–23)
BUN SERPL-MCNC: 7 MG/DL — SIGNIFICANT CHANGE UP (ref 7–23)
CALCIUM SERPL-MCNC: 8.8 MG/DL — SIGNIFICANT CHANGE UP (ref 8.4–10.5)
CALCIUM SERPL-MCNC: 9 MG/DL — SIGNIFICANT CHANGE UP (ref 8.4–10.5)
CHLORIDE BLDV-SCNC: 105 MMOL/L — SIGNIFICANT CHANGE UP (ref 96–108)
CHLORIDE SERPL-SCNC: 103 MMOL/L — SIGNIFICANT CHANGE UP (ref 98–107)
CHLORIDE SERPL-SCNC: 103 MMOL/L — SIGNIFICANT CHANGE UP (ref 98–107)
CO2 BLDV-SCNC: 27.9 MMOL/L — HIGH (ref 22–26)
CO2 SERPL-SCNC: 21 MMOL/L — LOW (ref 22–31)
CO2 SERPL-SCNC: 21 MMOL/L — LOW (ref 22–31)
CREAT SERPL-MCNC: 0.5 MG/DL — SIGNIFICANT CHANGE UP (ref 0.5–1.3)
CREAT SERPL-MCNC: 0.52 MG/DL — SIGNIFICANT CHANGE UP (ref 0.5–1.3)
EGFR: 109 ML/MIN/1.73M2 — SIGNIFICANT CHANGE UP
EGFR: 110 ML/MIN/1.73M2 — SIGNIFICANT CHANGE UP
EOSINOPHIL # BLD AUTO: 0.07 K/UL — SIGNIFICANT CHANGE UP (ref 0–0.5)
EOSINOPHIL NFR BLD AUTO: 0.9 % — SIGNIFICANT CHANGE UP (ref 0–6)
FLUAV AG NPH QL: SIGNIFICANT CHANGE UP
FLUBV AG NPH QL: SIGNIFICANT CHANGE UP
GAS PNL BLDV: 136 MMOL/L — SIGNIFICANT CHANGE UP (ref 136–145)
GLUCOSE BLDV-MCNC: 92 MG/DL — SIGNIFICANT CHANGE UP (ref 70–99)
GLUCOSE SERPL-MCNC: 92 MG/DL — SIGNIFICANT CHANGE UP (ref 70–99)
GLUCOSE SERPL-MCNC: 93 MG/DL — SIGNIFICANT CHANGE UP (ref 70–99)
HCO3 BLDV-SCNC: 26 MMOL/L — SIGNIFICANT CHANGE UP (ref 22–29)
HCT VFR BLD CALC: 33.5 % — LOW (ref 34.5–45)
HCT VFR BLDA CALC: 32 % — LOW (ref 34.5–46.5)
HGB BLD CALC-MCNC: 10.8 G/DL — LOW (ref 11.5–15.5)
HGB BLD-MCNC: 10.8 G/DL — LOW (ref 11.5–15.5)
IANC: 4 K/UL — SIGNIFICANT CHANGE UP (ref 1.8–7.4)
IMM GRANULOCYTES NFR BLD AUTO: 0.4 % — SIGNIFICANT CHANGE UP (ref 0–1.5)
LACTATE BLDV-MCNC: 1.7 MMOL/L — SIGNIFICANT CHANGE UP (ref 0.5–2)
LYMPHOCYTES # BLD AUTO: 2.76 K/UL — SIGNIFICANT CHANGE UP (ref 1–3.3)
LYMPHOCYTES # BLD AUTO: 36.1 % — SIGNIFICANT CHANGE UP (ref 13–44)
MCHC RBC-ENTMCNC: 24.9 PG — LOW (ref 27–34)
MCHC RBC-ENTMCNC: 32.2 GM/DL — SIGNIFICANT CHANGE UP (ref 32–36)
MCV RBC AUTO: 77.2 FL — LOW (ref 80–100)
MONOCYTES # BLD AUTO: 0.73 K/UL — SIGNIFICANT CHANGE UP (ref 0–0.9)
MONOCYTES NFR BLD AUTO: 9.6 % — SIGNIFICANT CHANGE UP (ref 2–14)
NEUTROPHILS # BLD AUTO: 4 K/UL — SIGNIFICANT CHANGE UP (ref 1.8–7.4)
NEUTROPHILS NFR BLD AUTO: 52.3 % — SIGNIFICANT CHANGE UP (ref 43–77)
NRBC # BLD: 0 /100 WBCS — SIGNIFICANT CHANGE UP
NRBC # FLD: 0 K/UL — SIGNIFICANT CHANGE UP
NT-PROBNP SERPL-SCNC: 49 PG/ML — SIGNIFICANT CHANGE UP
PCO2 BLDV: 51 MMHG — HIGH (ref 39–42)
PH BLDV: 7.32 — SIGNIFICANT CHANGE UP (ref 7.32–7.43)
PLATELET # BLD AUTO: 139 K/UL — LOW (ref 150–400)
PO2 BLDV: 69 MMHG — SIGNIFICANT CHANGE UP
POTASSIUM BLDV-SCNC: 4.5 MMOL/L — SIGNIFICANT CHANGE UP (ref 3.5–5.1)
POTASSIUM SERPL-MCNC: 5.3 MMOL/L — SIGNIFICANT CHANGE UP (ref 3.5–5.3)
POTASSIUM SERPL-MCNC: 5.5 MMOL/L — HIGH (ref 3.5–5.3)
POTASSIUM SERPL-SCNC: 5.3 MMOL/L — SIGNIFICANT CHANGE UP (ref 3.5–5.3)
POTASSIUM SERPL-SCNC: 5.5 MMOL/L — HIGH (ref 3.5–5.3)
PROT SERPL-MCNC: 8.4 G/DL — HIGH (ref 6–8.3)
RBC # BLD: 4.34 M/UL — SIGNIFICANT CHANGE UP (ref 3.8–5.2)
RBC # FLD: 13.9 % — SIGNIFICANT CHANGE UP (ref 10.3–14.5)
RSV RNA NPH QL NAA+NON-PROBE: SIGNIFICANT CHANGE UP
SAO2 % BLDV: 94 % — SIGNIFICANT CHANGE UP
SARS-COV-2 RNA SPEC QL NAA+PROBE: SIGNIFICANT CHANGE UP
SODIUM SERPL-SCNC: 135 MMOL/L — SIGNIFICANT CHANGE UP (ref 135–145)
SODIUM SERPL-SCNC: 136 MMOL/L — SIGNIFICANT CHANGE UP (ref 135–145)
TROPONIN T, HIGH SENSITIVITY RESULT: 7 NG/L — SIGNIFICANT CHANGE UP
WBC # BLD: 7.64 K/UL — SIGNIFICANT CHANGE UP (ref 3.8–10.5)
WBC # FLD AUTO: 7.64 K/UL — SIGNIFICANT CHANGE UP (ref 3.8–10.5)

## 2022-07-18 PROCEDURE — 71260 CT THORAX DX C+: CPT | Mod: 26,MD

## 2022-07-18 PROCEDURE — 72129 CT CHEST SPINE W/DYE: CPT | Mod: 26,MD

## 2022-07-18 PROCEDURE — 99285 EMERGENCY DEPT VISIT HI MDM: CPT

## 2022-07-18 PROCEDURE — 72126 CT NECK SPINE W/DYE: CPT | Mod: 26,MD

## 2022-07-18 RX ORDER — ACETAMINOPHEN 500 MG
1000 TABLET ORAL ONCE
Refills: 0 | Status: COMPLETED | OUTPATIENT
Start: 2022-07-18 | End: 2022-07-18

## 2022-07-18 RX ADMIN — Medication 400 MILLIGRAM(S): at 18:41

## 2022-07-18 NOTE — ED PROVIDER NOTE - CLINICAL SUMMARY MEDICAL DECISION MAKING FREE TEXT BOX
57F PMH as above here for worsening back pain and ongoing chest pain. VSS, saturating well on RA, TTP at paraspinal muscles > midline in cervicothoracic region, diminished lung sounds at bases. Will do basics, trop, BNP, CT Chest, and C- and T-spine to eval for fx vs mets and admit for bronchoscopy tomorrow.

## 2022-07-18 NOTE — ED PROVIDER NOTE - OBJECTIVE STATEMENT
C/o chest and back pain x weeks worse today, same type of pain evaluated for on 7/6, as per daughter could not sleep 2/2, codeine usually helps pain did not help, daughter also endorsing difficulty breathing denies n/v/f/d/c/belly pain. Sent in by Dr. Lyles to eval chest pain and for bronchoscopy. Chest pain stable, back pain worsenign over timeHaving hemoptysis, x 2-3 weeks stable currently. No worsening of chest pain when she moves around, no radiation of chest pain. Denies trauma to back, but daughter states that back pain is getting worse, constant pain that worsens when she coughs.    PMH of DM, ampullary carcinoma, HLD  No allergies 57F PMH of DM, metastatic ampullary carcinoma, HLD here for chest and back pain x weeks, same type of pain evaluated for on 7/6. As per daughter could not sleep last night 2/2 pain. Codeine usually helps cough and pain did not help yesterday. Daughter also endorsing difficulty breathing denies n/v/f/d/c/new belly pain. Sent in by Dr. Poe to eval chest pain and for bronchoscopy. Chest pain stable. Back pain atraumatic, constant, worsening over time, exacerbated by coughing. Endorses hemoptysis x 2-3 weeks stable currently. No worsening of chest pain when she moves around, no radiation of chest pain.

## 2022-07-18 NOTE — ED PROVIDER NOTE - ATTENDING CONTRIBUTION TO CARE
I have personally performed a face to face medical and diagnostic evaluation of the patient. I have discussed with and reviewed the Resident's note and agree with the History, ROS, Physical Exam and MDM unless otherwise indicated. A brief summary of my personal evaluation and impression can be found below.    57y F PMH of DM, metastatic ampullary carcinoma, HLD here for chest and back pain x weeks, same type of pain evaluated for on 7/6. As per daughter could not sleep last night 2/2 pain. Codeine usually helps cough and pain did not help yesterday. Daughter also endorsing difficulty breathing denies n/v/f/d/c/new belly pain. Sent in by Dr. Poe to eval chest pain and for bronchoscopy. Chest pain stable. Back pain atraumatic, constant, worsening over time, exacerbated by coughing. Endorses hemoptysis x 2-3 weeks stable currently. No worsening of chest pain when she moves around, no radiation of chest pain.    Physical Exam:  Gen: NAD, AOx3, non-toxic appearing, able to ambulate without assistance  Head: NCAT  HEENT: EOMI, PEERLA, normal conjunctiva, tongue midline, oral mucosa moist  Lung: CTAB, no respiratory distress, no wheezes/rhonchi/rales B/L, speaking in full sentences  CV: RRR, no murmurs, rubs or gallops  Abd: soft, NT, ND, no guarding, no rigidity, no rebound tenderness, no CVA tenderness   MSK: no visible deformities, ROM normal in UE/LE, no back pain  Neuro: No focal sensory or motor deficits  Skin: Warm, well perfused, no rash, no leg swelling  Psych: normal affect, calm    57y F PMH of DM, metastatic ampullary carcinoma, HLD here for chest and back pain x weeks, same type of pain evaluated for on 7/6. Patient abd soft, NT, lungs CTAB, +tenderness to R transverse process of cervical and thoracic spine C7-T4. Neuro intact, will give pain control, obtain CT C/T spine, CT chest, labs, TBA medicine for planned bronch tomorrow.

## 2022-07-18 NOTE — ED ADULT NURSE NOTE - ED STAT RN HANDOFF DETAILS
Report given to [facility] 2 RN Arabella, pt A&04 ambualtory, 20GIV in place RAC, vitals as per flowsheet. stabel upon exit.

## 2022-07-18 NOTE — CHART NOTE - NSCHARTNOTEFT_GEN_A_CORE
Brief Pulmonary Note    Admitted for bronchoscopy with biopsy. Plan for robot assisted bronchoscopy tomorrow.    - Please ensure NPO at midnight  - CBC and coags available in AM prior to procedure  - need covid testing within 72hrs of OR  - full pulmonary evaluation in AM    Scott Carmona MD  PGY-6  PCCM Fellow  Pager [phone]

## 2022-07-18 NOTE — ED PROVIDER NOTE - PROGRESS NOTE DETAILS
CT spine neg for fracture or acute pathology.  CT chest shows indeterminate findings but clinically low suspicion for PNA, will hold on treating.  tba medicine

## 2022-07-18 NOTE — ED PROVIDER NOTE - NS ED ROS FT
Gen: No fever, normal appetite  Eyes: No eye irritation or discharge  ENT: No ear pain, congestion, sore throat  Resp: (+) cough, trouble breathing  Cardiovascular: (+) chest pain, (-) palpitation  Gastroenteric: No nausea/vomiting, diarrhea, constipation  :  No change in urine output; no dysuria  MS: No joint or muscle pain  Skin: No rashes  Neuro: No headache; no abnormal movements  Remainder negative, except as per the HPI

## 2022-07-18 NOTE — ED ADULT TRIAGE NOTE - CHIEF COMPLAINT QUOTE
Pt c/o chest pain and due for Bronchoscopy tomorrow and sent by MD mendez for admission. PMH ampullary carcinoma with possible mets

## 2022-07-18 NOTE — ED ADULT NURSE NOTE - OBJECTIVE STATEMENT
Received patient to room 5 for chest pain. A&o4, ambulatory with assistance, Lao speaking, daughter at bedside, c/o chest pain and worsening back pain for few days, pt scheduled for bronchostomy tomorrow, was sent here to be admitted. Denies any chest pain at this time, appears comfortable, NSR on monitor, RR even and unlabored. Abdomen soft and nondistended, labs sent.

## 2022-07-18 NOTE — ED PROVIDER NOTE - PHYSICAL EXAMINATION
Gen: WDWN, patient appears uncomfortable  HEENT: EOMI, no nasal discharge, mucous membranes moist  CV: RRR, +S1/S2, no M/R/G  Resp: diminished lung sounds at bases  GI: Abdomen soft non-distended, minimally TTP (at b/l as per daughter), no masses  MSK: No open wounds, no bruising, no LE edema, TTP at the L cervicothoracic paraspinal muscles  Neuro: A&Ox4, following commands, moving all four extremities spontaneously  Psych: appropriate mood, denies AH, VH, SI

## 2022-07-19 ENCOUNTER — TRANSCRIPTION ENCOUNTER (OUTPATIENT)
Age: 57
End: 2022-07-19

## 2022-07-19 DIAGNOSIS — R91.8 OTHER NONSPECIFIC ABNORMAL FINDING OF LUNG FIELD: ICD-10-CM

## 2022-07-19 DIAGNOSIS — C24.1 MALIGNANT NEOPLASM OF AMPULLA OF VATER: ICD-10-CM

## 2022-07-19 DIAGNOSIS — E78.5 HYPERLIPIDEMIA, UNSPECIFIED: ICD-10-CM

## 2022-07-19 DIAGNOSIS — D69.6 THROMBOCYTOPENIA, UNSPECIFIED: ICD-10-CM

## 2022-07-19 DIAGNOSIS — R07.9 CHEST PAIN, UNSPECIFIED: ICD-10-CM

## 2022-07-19 DIAGNOSIS — E11.9 TYPE 2 DIABETES MELLITUS WITHOUT COMPLICATIONS: ICD-10-CM

## 2022-07-19 DIAGNOSIS — D64.9 ANEMIA, UNSPECIFIED: ICD-10-CM

## 2022-07-19 DIAGNOSIS — Z29.9 ENCOUNTER FOR PROPHYLACTIC MEASURES, UNSPECIFIED: ICD-10-CM

## 2022-07-19 LAB
ALBUMIN SERPL ELPH-MCNC: 3.9 G/DL — SIGNIFICANT CHANGE UP (ref 3.3–5)
ALP SERPL-CCNC: 115 U/L — SIGNIFICANT CHANGE UP (ref 40–120)
ALT FLD-CCNC: 18 U/L — SIGNIFICANT CHANGE UP (ref 4–33)
ANION GAP SERPL CALC-SCNC: 10 MMOL/L — SIGNIFICANT CHANGE UP (ref 7–14)
APTT BLD: 32.4 SEC — SIGNIFICANT CHANGE UP (ref 27–36.3)
AST SERPL-CCNC: 34 U/L — HIGH (ref 4–32)
BASOPHILS # BLD AUTO: 0.03 K/UL — SIGNIFICANT CHANGE UP (ref 0–0.2)
BASOPHILS NFR BLD AUTO: 0.5 % — SIGNIFICANT CHANGE UP (ref 0–2)
BILIRUB SERPL-MCNC: 0.7 MG/DL — SIGNIFICANT CHANGE UP (ref 0.2–1.2)
BLD GP AB SCN SERPL QL: NEGATIVE — SIGNIFICANT CHANGE UP
BUN SERPL-MCNC: 8 MG/DL — SIGNIFICANT CHANGE UP (ref 7–23)
CALCIUM SERPL-MCNC: 8.9 MG/DL — SIGNIFICANT CHANGE UP (ref 8.4–10.5)
CHLORIDE SERPL-SCNC: 103 MMOL/L — SIGNIFICANT CHANGE UP (ref 98–107)
CO2 SERPL-SCNC: 25 MMOL/L — SIGNIFICANT CHANGE UP (ref 22–31)
CREAT SERPL-MCNC: 0.5 MG/DL — SIGNIFICANT CHANGE UP (ref 0.5–1.3)
EGFR: 110 ML/MIN/1.73M2 — SIGNIFICANT CHANGE UP
EOSINOPHIL # BLD AUTO: 0.11 K/UL — SIGNIFICANT CHANGE UP (ref 0–0.5)
EOSINOPHIL NFR BLD AUTO: 1.8 % — SIGNIFICANT CHANGE UP (ref 0–6)
GLUCOSE BLDC GLUCOMTR-MCNC: 113 MG/DL — HIGH (ref 70–99)
GLUCOSE BLDC GLUCOMTR-MCNC: 141 MG/DL — HIGH (ref 70–99)
GLUCOSE BLDC GLUCOMTR-MCNC: 146 MG/DL — HIGH (ref 70–99)
GLUCOSE BLDC GLUCOMTR-MCNC: 192 MG/DL — HIGH (ref 70–99)
GLUCOSE SERPL-MCNC: 131 MG/DL — HIGH (ref 70–99)
HCT VFR BLD CALC: 30.8 % — LOW (ref 34.5–45)
HGB BLD-MCNC: 10.2 G/DL — LOW (ref 11.5–15.5)
IANC: 2.81 K/UL — SIGNIFICANT CHANGE UP (ref 1.8–7.4)
IMM GRANULOCYTES NFR BLD AUTO: 0.2 % — SIGNIFICANT CHANGE UP (ref 0–1.5)
INR BLD: 1.49 RATIO — HIGH (ref 0.88–1.16)
LYMPHOCYTES # BLD AUTO: 2.39 K/UL — SIGNIFICANT CHANGE UP (ref 1–3.3)
LYMPHOCYTES # BLD AUTO: 39.6 % — SIGNIFICANT CHANGE UP (ref 13–44)
MAGNESIUM SERPL-MCNC: 2 MG/DL — SIGNIFICANT CHANGE UP (ref 1.6–2.6)
MCHC RBC-ENTMCNC: 25.1 PG — LOW (ref 27–34)
MCHC RBC-ENTMCNC: 33.1 GM/DL — SIGNIFICANT CHANGE UP (ref 32–36)
MCV RBC AUTO: 75.9 FL — LOW (ref 80–100)
MONOCYTES # BLD AUTO: 0.69 K/UL — SIGNIFICANT CHANGE UP (ref 0–0.9)
MONOCYTES NFR BLD AUTO: 11.4 % — SIGNIFICANT CHANGE UP (ref 2–14)
NEUTROPHILS # BLD AUTO: 2.81 K/UL — SIGNIFICANT CHANGE UP (ref 1.8–7.4)
NEUTROPHILS NFR BLD AUTO: 46.5 % — SIGNIFICANT CHANGE UP (ref 43–77)
NRBC # BLD: 0 /100 WBCS — SIGNIFICANT CHANGE UP
NRBC # FLD: 0 K/UL — SIGNIFICANT CHANGE UP
PHOSPHATE SERPL-MCNC: 3.5 MG/DL — SIGNIFICANT CHANGE UP (ref 2.5–4.5)
PLATELET # BLD AUTO: 125 K/UL — LOW (ref 150–400)
POTASSIUM SERPL-MCNC: 3.6 MMOL/L — SIGNIFICANT CHANGE UP (ref 3.5–5.3)
POTASSIUM SERPL-SCNC: 3.6 MMOL/L — SIGNIFICANT CHANGE UP (ref 3.5–5.3)
PROT SERPL-MCNC: 7.2 G/DL — SIGNIFICANT CHANGE UP (ref 6–8.3)
PROTHROM AB SERPL-ACNC: 17.3 SEC — HIGH (ref 10.5–13.4)
RBC # BLD: 4.06 M/UL — SIGNIFICANT CHANGE UP (ref 3.8–5.2)
RBC # FLD: 14.2 % — SIGNIFICANT CHANGE UP (ref 10.3–14.5)
RH IG SCN BLD-IMP: POSITIVE — SIGNIFICANT CHANGE UP
SODIUM SERPL-SCNC: 138 MMOL/L — SIGNIFICANT CHANGE UP (ref 135–145)
WBC # BLD: 6.04 K/UL — SIGNIFICANT CHANGE UP (ref 3.8–10.5)
WBC # FLD AUTO: 6.04 K/UL — SIGNIFICANT CHANGE UP (ref 3.8–10.5)

## 2022-07-19 PROCEDURE — 99223 1ST HOSP IP/OBS HIGH 75: CPT

## 2022-07-19 PROCEDURE — 99223 1ST HOSP IP/OBS HIGH 75: CPT | Mod: GC

## 2022-07-19 PROCEDURE — 76705 ECHO EXAM OF ABDOMEN: CPT | Mod: 26

## 2022-07-19 RX ORDER — ENOXAPARIN SODIUM 100 MG/ML
40 INJECTION SUBCUTANEOUS EVERY 24 HOURS
Refills: 0 | Status: DISCONTINUED | OUTPATIENT
Start: 2022-07-19 | End: 2022-07-19

## 2022-07-19 RX ORDER — DEXTROSE 50 % IN WATER 50 %
25 SYRINGE (ML) INTRAVENOUS ONCE
Refills: 0 | Status: DISCONTINUED | OUTPATIENT
Start: 2022-07-19 | End: 2022-07-22

## 2022-07-19 RX ORDER — GLUCAGON INJECTION, SOLUTION 0.5 MG/.1ML
1 INJECTION, SOLUTION SUBCUTANEOUS ONCE
Refills: 0 | Status: DISCONTINUED | OUTPATIENT
Start: 2022-07-19 | End: 2022-07-22

## 2022-07-19 RX ORDER — CHLORHEXIDINE GLUCONATE 213 G/1000ML
1 SOLUTION TOPICAL DAILY
Refills: 0 | Status: DISCONTINUED | OUTPATIENT
Start: 2022-07-19 | End: 2022-07-22

## 2022-07-19 RX ORDER — SODIUM CHLORIDE 9 MG/ML
1000 INJECTION, SOLUTION INTRAVENOUS
Refills: 0 | Status: DISCONTINUED | OUTPATIENT
Start: 2022-07-19 | End: 2022-07-22

## 2022-07-19 RX ORDER — LANOLIN ALCOHOL/MO/W.PET/CERES
3 CREAM (GRAM) TOPICAL AT BEDTIME
Refills: 0 | Status: DISCONTINUED | OUTPATIENT
Start: 2022-07-19 | End: 2022-07-22

## 2022-07-19 RX ORDER — ATORVASTATIN CALCIUM 80 MG/1
10 TABLET, FILM COATED ORAL AT BEDTIME
Refills: 0 | Status: DISCONTINUED | OUTPATIENT
Start: 2022-07-19 | End: 2022-07-22

## 2022-07-19 RX ORDER — DEXTROSE 50 % IN WATER 50 %
15 SYRINGE (ML) INTRAVENOUS ONCE
Refills: 0 | Status: DISCONTINUED | OUTPATIENT
Start: 2022-07-19 | End: 2022-07-22

## 2022-07-19 RX ORDER — OMEPRAZOLE 10 MG/1
1 CAPSULE, DELAYED RELEASE ORAL
Qty: 0 | Refills: 0 | DISCHARGE

## 2022-07-19 RX ORDER — GABAPENTIN 400 MG/1
300 CAPSULE ORAL
Refills: 0 | Status: DISCONTINUED | OUTPATIENT
Start: 2022-07-19 | End: 2022-07-22

## 2022-07-19 RX ORDER — DEXTROSE 50 % IN WATER 50 %
12.5 SYRINGE (ML) INTRAVENOUS ONCE
Refills: 0 | Status: DISCONTINUED | OUTPATIENT
Start: 2022-07-19 | End: 2022-07-22

## 2022-07-19 RX ORDER — ACETAMINOPHEN 500 MG
650 TABLET ORAL EVERY 6 HOURS
Refills: 0 | Status: DISCONTINUED | OUTPATIENT
Start: 2022-07-19 | End: 2022-07-22

## 2022-07-19 RX ORDER — INSULIN LISPRO 100/ML
VIAL (ML) SUBCUTANEOUS
Refills: 0 | Status: DISCONTINUED | OUTPATIENT
Start: 2022-07-19 | End: 2022-07-22

## 2022-07-19 RX ORDER — ACETAMINOPHEN 500 MG
1000 TABLET ORAL ONCE
Refills: 0 | Status: COMPLETED | OUTPATIENT
Start: 2022-07-19 | End: 2022-07-19

## 2022-07-19 RX ADMIN — Medication 2: at 18:18

## 2022-07-19 RX ADMIN — Medication 400 MILLIGRAM(S): at 06:32

## 2022-07-19 RX ADMIN — ATORVASTATIN CALCIUM 10 MILLIGRAM(S): 80 TABLET, FILM COATED ORAL at 22:14

## 2022-07-19 RX ADMIN — GABAPENTIN 300 MILLIGRAM(S): 400 CAPSULE ORAL at 18:45

## 2022-07-19 NOTE — H&P ADULT - PROBLEM SELECTOR PLAN 1
Presented with left sided chest pain as above, mass like consolidation noted on PET CT 7/1/22 and on CT 7/18. S/p 5 days of levofloxacin. Concerning for new or recurrent malignancy.   -Oncology and Pulmonary following, appreciate recs  -plan for bronchoscopy today  -hold off on Abx since no other signs of infection  -follow up bronch results Presented with left sided chest pain as above, mass like consolidation noted on PET CT 7/1/22 and on CT 7/18. S/p 5 days of levofloxacin. Concerning for new or recurrent malignancy.   -Oncology and Pulmonary following, appreciate recs  -plan was for bronchoscopy today, unfortunately patient had a piece of bread this morning, now awaiting re-scheduling of bronch  -hold off on Abx since no other signs of infection  -follow up bronch results

## 2022-07-19 NOTE — CONSULT NOTE ADULT - SUBJECTIVE AND OBJECTIVE BOX
Patient is a 57y old  Female who presents with a chief complaint of YUNI masslike lesion (19 Jul 2022 13:50)      HPI:  57 y.o female with hx of ampullary carcinoma s/p Whipple in 2019, HLD, DM II, presenting for 4 weeks of left sided chest pain radiating to the back.     Patient report around 4 weeks of left sided chest pain, radiating to her back. Described as pressure and stabbing. Got worse the last 2 days and intensity was 10/10. She had associated cough without sputum production. Chest pain not related to exertion. Has never had similar pain in the past. Denies any fever/chills/nausea/vomiting/diarrhea.     Presented to the ED on 7/6, had a CTA with negative for PE, but YUNI consolidation, was given levaquin for 5 days, which did not improved her symptoms. PET/CT done as OP returned was concerning for a YUNI mass. Here in the ED 7/18, for persistent pain, repeated CT with YUNI masslike lesion. Seen by pulmonary and oncology. Admitted for bronchoscopy.  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 1000 milliLiter(s) (100 mL/Hr) IV Continuous <Continuous>  dextrose 5%. 1000 milliLiter(s) (50 mL/Hr) IV Continuous <Continuous>  dextrose 50% Injectable 25 Gram(s) IV Push once  dextrose 50% Injectable 12.5 Gram(s) IV Push once  dextrose 50% Injectable 25 Gram(s) IV Push once  gabapentin 300 milliGRAM(s) Oral two times a day  glucagon  Injectable 1 milliGRAM(s) IntraMuscular once  insulin lispro (ADMELOG) corrective regimen sliding scale   SubCutaneous three times a day before meals    MEDICATIONS  (PRN):  acetaminophen     Tablet .. 650 milliGRAM(s) Oral every 6 hours PRN Temp greater or equal to 38C (100.4F), Mild Pain (1 - 3)  dextrose Oral Gel 15 Gram(s) Oral once PRN Blood Glucose LESS THAN 70 milliGRAM(s)/deciliter  melatonin 3 milliGRAM(s) Oral at bedtime PRN Insomnia      Allergies    No Known Allergies    Intolerances        Vital Signs Last 24 Hrs  T(C): 36.8 (19 Jul 2022 12:31), Max: 36.8 (18 Jul 2022 23:07)  T(F): 98.2 (19 Jul 2022 12:31), Max: 98.2 (18 Jul 2022 23:07)  HR: 61 (19 Jul 2022 12:31) (61 - 72)  BP: 108/62 (19 Jul 2022 12:31) (98/66 - 108/62)  BP(mean): 70 (18 Jul 2022 23:07) (70 - 70)  RR: 17 (19 Jul 2022 12:31) (16 - 20)  SpO2: 100% (19 Jul 2022 12:31) (99% - 100%)    Parameters below as of 19 Jul 2022 12:31  Patient On (Oxygen Delivery Method): room air        PHYSICAL EXAM  General: adult in NAD  HEENT: clear oropharynx, anicteric sclera, pink conjunctiva  Neck: supple  CV: normal S1/S2 with no murmur rubs or gallops  Lungs: positive air movement b/l ant lungs, clear to auscultation, no wheezes, no rales  Abdomen: soft non-tender non-distended, no hepatosplenomegaly  Ext: no clubbing cyanosis or edema  Skin: no rashes and no petechiae  Neuro: alert and oriented X 3, none focal    LABS:                          10.2   6.04  )-----------( 125      ( 19 Jul 2022 04:11 )             30.8         Mean Cell Volume : 75.9 fL  Mean Cell Hemoglobin : 25.1 pg  Mean Cell Hemoglobin Concentration : 33.1 gm/dL  Auto Neutrophil # : 2.81 K/uL  Auto Lymphocyte # : 2.39 K/uL  Auto Monocyte # : 0.69 K/uL  Auto Eosinophil # : 0.11 K/uL  Auto Basophil # : 0.03 K/uL  Auto Neutrophil % : 46.5 %  Auto Lymphocyte % : 39.6 %  Auto Monocyte % : 11.4 %  Auto Eosinophil % : 1.8 %  Auto Basophil % : 0.5 %      Serial CBC's  07-19 @ 04:11  Hct-30.8 / Hgb-10.2 / Plat-125 / RBC-4.06 / WBC-6.04  Serial CBC's  07-18 @ 18:00  Hct-33.5 / Hgb-10.8 / Plat-139 / RBC-4.34 / WBC-7.64      07-19    138  |  103  |  8   ----------------------------<  131<H>  3.6   |  25  |  0.50    Ca    8.9      19 Jul 2022 04:11  Phos  3.5     07-19  Mg     2.00     07-19    TPro  7.2  /  Alb  3.9  /  TBili  0.7  /  DBili  x   /  AST  34<H>  /  ALT  18  /  AlkPhos  115  07-19      PT/INR - ( 19 Jul 2022 04:11 )   PT: 17.3 sec;   INR: 1.49 ratio         PTT - ( 19 Jul 2022 04:11 )  PTT:32.4 sec                RADIOLOGY & ADDITIONAL STUDIES:    IMPRESSION:    Masslike consolidation in the anterior left upper lung with intense   uptake and left paratracheal lymph node. Consider CT-guided biopsy to   exclude metastatic disease.    Small focus of uptake in the right hepatic lobe near the severino hepatis   suspicious for metastatic lesion. Consider MRI of the liver with   gadolinium.    Post surgical changes in the pancreas and small bowel.    Borderline spleen size.    Postsurgical changes in the anterior midline abdominal wall with   resolution of abdominal wall hematoma.    Stable right cardiophrenic lymph node.

## 2022-07-19 NOTE — PROVIDER CONTACT NOTE (OTHER) - ASSESSMENT
Patient NPO order discontinued by MD so RN fed patient. NPO order reinstated. Patient ate few spoonfuls of eggs and cereal and few sips of apple juice.   Pending bronchoscopy.

## 2022-07-19 NOTE — PATIENT PROFILE ADULT - NSPROIMPLANTSMEDDEV_GEN_A_NUR
chemoport

## 2022-07-19 NOTE — CONSULT NOTE ADULT - ASSESSMENT
57 F w/ h/o ampullary ca s/p resection and chemo 4 yrs ago (has been on surveillance since), noted to have rising CA 19-9 over the past couple of months. PET/CT ordered, but within 2 days of having it done, she called with c/o hemoptysis, back pain, SOB. Went to ER where a CTA was read as possible large YUNI PNA, sent home with oral Ab. PET/CT then returned as concerning for a mass. She has completed Ab course without improvement in her symptoms.  Recent CA 19-9 is exponentially increasing.   Plan is for bronchoscopy for further evaluation of the lung mass.     Recent PET/CT 7/1/22 reviewed   Masslike consolidation in the anterior left upper lung with intense uptake and left paratracheal lymph node. Consider CT-guided biopsy to exclude metastatic disease.  Small focus of uptake in the right hepatic lobe near the severino hepatis suspicious for metastatic lesion. Consider MRI of the liver with gadolinium.  Post surgical changes in the pancreas and small bowel.  Borderline spleen size.  Postsurgical changes in the anterior midline abdominal wall with resolution of abdominal wall hematoma.  Stable right cardiophrenic lymph node.    CT C/T spine without evidence mets.     -Pulm consult, will follow bronch results  -Check iron studies, ferritin, retic, B12, folate, hgb elecrophoresis  -Consider MRI liver with delbert (can be done outpatient)  -Pain control  -Supportive care, pain control, Nutrition, PT, DVT ppx  -Outpatient oncology f/u    Will follow. Please do not hesitate to call back with questions.     Alejandrina Cronin MD  Medical Oncology Attending  C: [phone]     time spent on direct patient care, interdisciplinary discussions and chart review.    
58 yo F PMH DM, HTN, and stage III ampullary adenocarcinoma (dx 12/2018; s/p whipple pancreaticoduodenectomy T3N1b and 8 cycles Gemzar/Xeloda [4/30-10/21/2019] now on surveillance) presenting for subacute hemoptysis likely 2/2 YUNI mass. Interventional pulmonary consulted for bronchoscopy and biopsy.     - imaging reviewed - concern for YUNI mass w/ superimposed post-obstructive pneumonia. Also w/ concern for invasion of left pulmonary artery which may be source of hemoptysis/bleeding. PET/CT w/ uptake in YUNI as well as 10L lymph node  - will plan for bronchoscopy tomorrow w/ EBUS and lymph node biopsy. Noted to have mediastinal lymphadenopathy on CT though may represent reactive LAD due to post-obstructive pneumonia.   - please provide patient with sputum cup to quantify hemoptysis. If >100cc/hr or 250cc/day please notify MICU  - please ensure NPO at night  - would hold AC given hemoptysis  - needs covid swab within 72hrs of OR time  - please ensure CBC and coags available for tomorrow AM    Scott Carmona MD  PGY-[id number]  PCCM Fellow  Pager [phone]

## 2022-07-19 NOTE — H&P ADULT - HISTORY OF PRESENT ILLNESS
57 y.o female with hx of ampullary carcinoma s/p Whipple in 2019, HLD, DM II, presenting for 4 weeks of left sided chest pain radiating to the back.     Patient report around 4 weeks of left sided chest pain, radiating to her back. Described as pressure and stabbing. Got worse the last 2 days and intensity was 10/10. She had associated cough without sputum production. Chest pain not related to exertion. Has never had similar pain in the past. Denies any fever/chills/nausea/vomiting/diarrhea.     Presented to the ED on 7/6, had a CTA with negative for PE, but YUNI consolidation, was given levaquin for 5 days, which did not improved her symptoms. PET/CT done as OP returned was concerning for a YUNI mass. Here in the ED 7/18, for persistent pain, repeated CT with YUNI masslike lesion. Seen by pulmonary and oncology. Admitted for bronchoscopy.

## 2022-07-19 NOTE — CONSULT NOTE ADULT - ATTENDING COMMENTS
Patient seen and examined at the bedside. In summary, this is a 58 yo F PMH DM, HTN, and stage III ampullary adenocarcinoma (dx 12/2018; s/p whipple pancreaticoduodenectomy T3N1b and 8 cycles Gemzar/Xeloda [4/30-10/21/2019] now on surveillance) presenting for subacute hemoptysis likely 2/2 YUNI PET avid mass. Interventional pulmonary consulted for bronchoscopy and biopsy. Was scheduled for wen bronch/ebus/possible endobronchial ablation on 7/19, but patient ate due to issues with the NPO order which led to cancellation of the procedure. Patient will be rescheduled for end of day on 7/20. Please ensure NPO. Hold anticoagulation in anticipation of lung biopsy/node biopsy. Risks and benefits explained. Case discussed extensively with medical oncologist (Dr. Navarro) and primary team. Page IP with questions or any acute worsening of hemoptysis.

## 2022-07-19 NOTE — H&P ADULT - ASSESSMENT
57 y.o female with hx of ampullary carcinoma s/p Whipple in 2019, HLD, DM II, presenting for 4 weeks of left sided chest pain radiating to the back, found to have a YUNI masslike lesion concerning for malignancy.

## 2022-07-19 NOTE — CONSULT NOTE ADULT - SUBJECTIVE AND OBJECTIVE BOX
CHIEF COMPLAINT: Hemoptysis, YUNI mass    HPI:  56 yo F PMH DM, HTN, and stage III ampullary adenocarcinoma (dx 12/2018; s/p whipple pancreaticoduodenectomy T3N1b and 8 cycles Gemzar/Xeloda [4/30-10/21/2019] now on surveillance) presenting for evaluation of hemoptysis and YUNI mass. Follows with outpatient oncologist, Dr. Navarro. Last seen in clinic 6/30/22 w/ concern for rising CA 19-9 levels. Ordered for follow up PET/CT though within 2 days of scan noted to have worsening SOB, back pain, and hemoptysis. Went to [facility] ED w/ CTA showing concern for YUNI consolidation. Thought 2/2 pneumonia and discharged on PO levaquin. PET/CT findings w/ FDG uptake concerning for malignancy/mass. Further review of imaging concerning for possible invasion of L pulmonary artery. Decision made for inpatient admission and bronchoscopic evaluation with biopsy. ROS notable for left sided chest pressure/sharp pain w/ radiation to back. Non exertional. Denies fevers, chills, nausea, vomiting.     In ED noted to be AF with saturations of 100% on RA. Labs with stable anemia to 10. RVP negative. Admitted to medicine for further evaluation of hemoptysis and YUNI mass.       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      H/O Whipple procedure  1/11/2019      Hemorrhage of gastroduodenal artery  s/p surgery 1/18/19      H/O drainage of abscess  and abdominal wall repair 1/21/19      H/O eye surgery      Admission for chemotherapy  right chest wall port          FAMILY HISTORY:  Family history of diabetes mellitus (DM) (Sibling)    FH: HTN (hypertension) (Sibling)        SOCIAL HISTORY:  Smoking: [ ] Never Smoked [ ] Former Smoker (__ packs x ___ years) [ ] Current Smoker  (__ packs x ___ years)  Substance Use: [ ] Never Used [ ] Used ____  EtOH Use:  Marital Status: [ ] Single [ ]  [ ]  [ ]   Sexual History:   Occupation:  Recent Travel:  Country of Birth:  Advance Directives:    Allergies    No Known Allergies    Intolerances        HOME MEDICATIONS:  Home Medications:  acetaminophen 325 mg oral tablet: 3 tab(s) orally every 6 hours (19 Jul 2022 09:47)  atorvastatin 10 mg oral tablet: 1 tab(s) orally once a day (19 Jul 2022 09:47)  Colace 2-in-1 50 mg-8.6 mg oral tablet: 3 tab(s) orally once a day (at bedtime) (19 Jul 2022 09:47)  gabapentin 300 mg oral capsule: 1 cap(s) orally 2 times a day (19 Jul 2022 09:47)  glimepiride 1 mg oral tablet: 1 tab(s) orally once a day (19 Jul 2022 09:47)  Januvia 100 mg oral tablet: 1 tab(s) orally once a day (19 Jul 2022 09:47)  metFORMIN 500 mg oral tablet: 1 tab(s) orally 2 times a day (19 Jul 2022 09:47)  montelukast 10 mg oral tablet: 1 tab(s) orally once a day (at bedtime) (19 Jul 2022 09:47)  Multiple Vitamins oral capsule: 1 cap(s) orally once a day. Last dose 1/13/22. (19 Jul 2022 09:47)  Vitamin D3 125 mcg (5000 intl units) oral capsule: 1 cap(s) orally once a day (19 Jul 2022 09:47)      REVIEW OF SYSTEMS:  Constitutional: [ ] negative [ ] fevers [ ] chills [ ] weight loss [ ] weight gain  HEENT: [ ] negative [ ] dry eyes [ ] eye irritation [ ] postnasal drip [ ] nasal congestion  CV: [ ] negative  [ ] chest pain [ ] orthopnea [ ] palpitations [ ] murmur  Resp: [ ] negative [ ] cough [ ] shortness of breath [ ] dyspnea [ ] wheezing [ ] sputum [ ] hemoptysis  GI: [ ] negative [ ] nausea [ ] vomiting [ ] diarrhea [ ] constipation [ ] abd pain [ ] dysphagia   : [ ] negative [ ] dysuria [ ] nocturia [ ] hematuria [ ] increased urinary frequency  Musculoskeletal: [ ] negative [ ] back pain [ ] myalgias [ ] arthralgias [ ] fracture  Skin: [ ] negative [ ] rash [ ] itch  Neurological: [ ] negative [ ] headache [ ] dizziness [ ] syncope [ ] weakness [ ] numbness  Psychiatric: [ ] negative [ ] anxiety [ ] depression  Endocrine: [ ] negative [ ] diabetes [ ] thyroid problem  Hematologic/Lymphatic: [ ] negative [ ] anemia [ ] bleeding problem  Allergic/Immunologic: [ ] negative [ ] itchy eyes [ ] nasal discharge [ ] hives [ ] angioedema  [ ] All other systems negative  [ ] Unable to assess ROS because ________    OBJECTIVE:  ICU Vital Signs Last 24 Hrs  T(C): 36.8 (19 Jul 2022 12:31), Max: 36.8 (18 Jul 2022 23:07)  T(F): 98.2 (19 Jul 2022 12:31), Max: 98.2 (18 Jul 2022 23:07)  HR: 61 (19 Jul 2022 12:31) (61 - 72)  BP: 108/62 (19 Jul 2022 12:31) (98/66 - 108/62)  BP(mean): 70 (18 Jul 2022 23:07) (70 - 70)  ABP: --  ABP(mean): --  RR: 17 (19 Jul 2022 12:31) (16 - 20)  SpO2: 100% (19 Jul 2022 12:31) (99% - 100%)    O2 Parameters below as of 19 Jul 2022 12:31  Patient On (Oxygen Delivery Method): room air              CAPILLARY BLOOD GLUCOSE      POCT Blood Glucose.: 146 mg/dL (19 Jul 2022 12:38)      PHYSICAL EXAM:  General:   HEENT:   Lymph Nodes:  Neck:   Respiratory:   Cardiovascular:   Abdomen:   Extremities:   Skin:   Neurological:  Psychiatry:    HOSPITAL MEDICATIONS:  Standing Meds:  atorvastatin 10 milliGRAM(s) Oral at bedtime  dextrose 5%. 1000 milliLiter(s) IV Continuous <Continuous>  dextrose 5%. 1000 milliLiter(s) IV Continuous <Continuous>  dextrose 50% Injectable 25 Gram(s) IV Push once  dextrose 50% Injectable 12.5 Gram(s) IV Push once  dextrose 50% Injectable 25 Gram(s) IV Push once  gabapentin 300 milliGRAM(s) Oral two times a day  glucagon  Injectable 1 milliGRAM(s) IntraMuscular once  insulin lispro (ADMELOG) corrective regimen sliding scale   SubCutaneous three times a day before meals      PRN Meds:  acetaminophen     Tablet .. 650 milliGRAM(s) Oral every 6 hours PRN  dextrose Oral Gel 15 Gram(s) Oral once PRN  melatonin 3 milliGRAM(s) Oral at bedtime PRN      LABS:                        10.2   6.04  )-----------( 125      ( 19 Jul 2022 04:11 )             30.8     Hgb Trend: 10.2<--, 10.8<--  07-19    138  |  103  |  8   ----------------------------<  131<H>  3.6   |  25  |  0.50    Ca    8.9      19 Jul 2022 04:11  Phos  3.5     07-19  Mg     2.00     07-19    TPro  7.2  /  Alb  3.9  /  TBili  0.7  /  DBili  x   /  AST  34<H>  /  ALT  18  /  AlkPhos  115  07-19    Creatinine Trend: 0.50<--, 0.52<--, 0.50<--, 0.61<--  PT/INR - ( 19 Jul 2022 04:11 )   PT: 17.3 sec;   INR: 1.49 ratio         PTT - ( 19 Jul 2022 04:11 )  PTT:32.4 sec      Venous Blood Gas:  07-18 @ 18:00  7.32/51/69/26/94.0  VBG Lactate: 1.7      MICROBIOLOGY:       RADIOLOGY:  [ ] Reviewed and interpreted by me    PULMONARY FUNCTION TESTS:    EKG: CHIEF COMPLAINT: Hemoptysis, YUNI mass    HPI:  58 yo F PMH DM, HTN, and stage III ampullary adenocarcinoma (dx 12/2018; s/p whipple pancreaticoduodenectomy T3N1b and 8 cycles Gemzar/Xeloda [4/30-10/21/2019] now on surveillance) presenting for evaluation of hemoptysis and YUNI mass. Follows with outpatient oncologist, Dr. Navarro. Last seen in clinic 6/30/22 w/ concern for rising CA 19-9 levels. Ordered for follow up PET/CT though within 2 days of scan noted to have worsening SOB, back pain, and hemoptysis. Went to [facility] ED w/ CTA showing concern for YUNI consolidation. Thought 2/2 pneumonia and discharged on PO levaquin. PET/CT findings w/ FDG uptake concerning for malignancy/mass. Further review of imaging concerning for possible invasion of L pulmonary artery. Decision made for inpatient admission and bronchoscopic evaluation with biopsy. ROS notable for left sided chest pressure/sharp pain w/ radiation to back. Non exertional. Denies fevers, chills, nausea, vomiting, recent travel, or sick contacts. ~5lb weight loss over past 3 months.     Regarding hemoptysis reports has had ongoing hemoptysis since 7/1. Initially dark red though progressed to bright red blood. Occurs each time she coughs. No relation to position or exertion. No associated epistaxis. Per daughter reports ~200cc a day w/ phlegm.    In ED noted to be AF with saturations of 100% on RA. Labs with stable anemia to 10. RVP negative. Admitted to medicine for further evaluation of hemoptysis and YUNI mass.       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      H/O Whipple procedure  1/11/2019      Hemorrhage of gastroduodenal artery  s/p surgery 1/18/19      H/O drainage of abscess  and abdominal wall repair 1/21/19      H/O eye surgery      Admission for chemotherapy  right chest wall port          FAMILY HISTORY:  Family history of diabetes mellitus (DM) (Sibling)    FH: HTN (hypertension) (Sibling)        SOCIAL HISTORY:  Smoking: [x] Never Smoked [ ] Former Smoker (__ packs x ___ years) [ ] Current Smoker  (__ packs x ___ years)  Substance Use: [x] Never Used [ ] Used ____  EtOH Use: Denies  Marital Status: [ ] Single [ ]  [ ]  [ ]   Sexual History:   Occupation:  Recent Travel:  Country of Birth:  Advance Directives:    Allergies    No Known Allergies    Intolerances        HOME MEDICATIONS:  Home Medications:  acetaminophen 325 mg oral tablet: 3 tab(s) orally every 6 hours (19 Jul 2022 09:47)  atorvastatin 10 mg oral tablet: 1 tab(s) orally once a day (19 Jul 2022 09:47)  Colace 2-in-1 50 mg-8.6 mg oral tablet: 3 tab(s) orally once a day (at bedtime) (19 Jul 2022 09:47)  gabapentin 300 mg oral capsule: 1 cap(s) orally 2 times a day (19 Jul 2022 09:47)  glimepiride 1 mg oral tablet: 1 tab(s) orally once a day (19 Jul 2022 09:47)  Januvia 100 mg oral tablet: 1 tab(s) orally once a day (19 Jul 2022 09:47)  metFORMIN 500 mg oral tablet: 1 tab(s) orally 2 times a day (19 Jul 2022 09:47)  montelukast 10 mg oral tablet: 1 tab(s) orally once a day (at bedtime) (19 Jul 2022 09:47)  Multiple Vitamins oral capsule: 1 cap(s) orally once a day. Last dose 1/13/22. (19 Jul 2022 09:47)  Vitamin D3 125 mcg (5000 intl units) oral capsule: 1 cap(s) orally once a day (19 Jul 2022 09:47)      REVIEW OF SYSTEMS:  Constitutional: [ ] negative [ ] fevers [ ] chills [x] weight loss [ ] weight gain  HEENT: [x] negative [ ] dry eyes [ ] eye irritation [ ] postnasal drip [ ] nasal congestion  CV: [ ] negative  [x] chest pain [ ] orthopnea [ ] palpitations [ ] murmur  Resp: [ ] negative [x] cough [x] shortness of breath [ ] dyspnea [ ] wheezing [ ] sputum [x] hemoptysis  GI: [x] negative [ ] nausea [ ] vomiting [ ] diarrhea [ ] constipation [ ] abd pain [ ] dysphagia   : [x] negative [ ] dysuria [ ] nocturia [ ] hematuria [ ] increased urinary frequency  Musculoskeletal: [ ] negative [x] back pain [ ] myalgias [ ] arthralgias [ ] fracture  Skin: [ ] negative [ ] rash [ ] itch  Neurological: [ ] negative [ ] headache [ ] dizziness [ ] syncope [ ] weakness [ ] numbness  Psychiatric: [ ] negative [ ] anxiety [ ] depression  Endocrine: [ ] negative [ ] diabetes [ ] thyroid problem  Hematologic/Lymphatic: [ ] negative [ ] anemia [ ] bleeding problem  Allergic/Immunologic: [ ] negative [ ] itchy eyes [ ] nasal discharge [ ] hives [ ] angioedema  [x] All other systems negative  [ ] Unable to assess ROS because ________    OBJECTIVE:  ICU Vital Signs Last 24 Hrs  T(C): 36.8 (19 Jul 2022 12:31), Max: 36.8 (18 Jul 2022 23:07)  T(F): 98.2 (19 Jul 2022 12:31), Max: 98.2 (18 Jul 2022 23:07)  HR: 61 (19 Jul 2022 12:31) (61 - 72)  BP: 108/62 (19 Jul 2022 12:31) (98/66 - 108/62)  BP(mean): 70 (18 Jul 2022 23:07) (70 - 70)  ABP: --  ABP(mean): --  RR: 17 (19 Jul 2022 12:31) (16 - 20)  SpO2: 100% (19 Jul 2022 12:31) (99% - 100%)    O2 Parameters below as of 19 Jul 2022 12:31  Patient On (Oxygen Delivery Method): room air              CAPILLARY BLOOD GLUCOSE      POCT Blood Glucose.: 146 mg/dL (19 Jul 2022 12:38)      PHYSICAL EXAM:  General: Female, NAD  HEENT: EOMI  Neck: Supple  Respiratory: No wheezes or increased WOB  Cardiovascular: RRR no mrg  Abdomen: Soft  Extremities: WWP, no edema  Skin: Intact   Neurological: AOx3  Psychiatry: Normal affect    HOSPITAL MEDICATIONS:  Standing Meds:  atorvastatin 10 milliGRAM(s) Oral at bedtime  dextrose 5%. 1000 milliLiter(s) IV Continuous <Continuous>  dextrose 5%. 1000 milliLiter(s) IV Continuous <Continuous>  dextrose 50% Injectable 25 Gram(s) IV Push once  dextrose 50% Injectable 12.5 Gram(s) IV Push once  dextrose 50% Injectable 25 Gram(s) IV Push once  gabapentin 300 milliGRAM(s) Oral two times a day  glucagon  Injectable 1 milliGRAM(s) IntraMuscular once  insulin lispro (ADMELOG) corrective regimen sliding scale   SubCutaneous three times a day before meals      PRN Meds:  acetaminophen     Tablet .. 650 milliGRAM(s) Oral every 6 hours PRN  dextrose Oral Gel 15 Gram(s) Oral once PRN  melatonin 3 milliGRAM(s) Oral at bedtime PRN      LABS:                        10.2   6.04  )-----------( 125      ( 19 Jul 2022 04:11 )             30.8     Hgb Trend: 10.2<--, 10.8<--  07-19    138  |  103  |  8   ----------------------------<  131<H>  3.6   |  25  |  0.50    Ca    8.9      19 Jul 2022 04:11  Phos  3.5     07-19  Mg     2.00     07-19    TPro  7.2  /  Alb  3.9  /  TBili  0.7  /  DBili  x   /  AST  34<H>  /  ALT  18  /  AlkPhos  115  07-19    Creatinine Trend: 0.50<--, 0.52<--, 0.50<--, 0.61<--  PT/INR - ( 19 Jul 2022 04:11 )   PT: 17.3 sec;   INR: 1.49 ratio         PTT - ( 19 Jul 2022 04:11 )  PTT:32.4 sec      Venous Blood Gas:  07-18 @ 18:00  7.32/51/69/26/94.0  VBG Lactate: 1.7      MICROBIOLOGY:       RADIOLOGY:  [ ] Reviewed and interpreted by me    PULMONARY FUNCTION TESTS:    EKG:

## 2022-07-19 NOTE — H&P ADULT - PROBLEM SELECTOR PLAN 2
chronic mild, platelets 125, with mild AST elevation and INR. Prior CT abdomen with liver nodular countor  -check RUQ ultrasound to eval for cirrhosis  -also lesion on liver on PET/CT- recommending MRI of the liver

## 2022-07-19 NOTE — H&P ADULT - NSHPLABSRESULTS_GEN_ALL_CORE
.  LABS:                         10.2   6.04  )-----------( 125      ( 19 Jul 2022 04:11 )             30.8     07-19    138  |  103  |  8   ----------------------------<  131<H>  3.6   |  25  |  0.50    Ca    8.9      19 Jul 2022 04:11  Phos  3.5     07-19  Mg     2.00     07-19    TPro  7.2  /  Alb  3.9  /  TBili  0.7  /  DBili  x   /  AST  34<H>  /  ALT  18  /  AlkPhos  115  07-19    PT/INR - ( 19 Jul 2022 04:11 )   PT: 17.3 sec;   INR: 1.49 ratio         PTT - ( 19 Jul 2022 04:11 )  PTT:32.4 sec              RADIOLOGY, EKG & ADDITIONAL TESTS: Reviewed.

## 2022-07-19 NOTE — PATIENT PROFILE ADULT - FALL HARM RISK - HARM RISK INTERVENTIONS

## 2022-07-19 NOTE — H&P ADULT - NSHPREVIEWOFSYSTEMS_GEN_ALL_CORE
REVIEW OF SYSTEMS:    CONSTITUTIONAL: No weakness, fevers or chills  EYES/ENT: No visual changes;  No vertigo or throat pain   NECK: No pain or stiffness  RESPIRATORY: positive for cough, no wheezing, No shortness of breath  CARDIOVASCULAR: positive for chest pain, no palpitations  GASTROINTESTINAL: No abdominal or epigastric pain. No nausea, vomiting, or hematemesis; No diarrhea or constipation. No melena or hematochezia.  GENITOURINARY: No dysuria, frequency or hematuria  NEUROLOGICAL: No numbness or weakness  SKIN: No itching, rashes

## 2022-07-20 ENCOUNTER — RESULT REVIEW (OUTPATIENT)
Age: 57
End: 2022-07-20

## 2022-07-20 ENCOUNTER — TRANSCRIPTION ENCOUNTER (OUTPATIENT)
Age: 57
End: 2022-07-20

## 2022-07-20 LAB
A1C WITH ESTIMATED AVERAGE GLUCOSE RESULT: 5.7 % — HIGH (ref 4–5.6)
ALBUMIN SERPL ELPH-MCNC: 3.9 G/DL — SIGNIFICANT CHANGE UP (ref 3.3–5)
ALP SERPL-CCNC: 138 U/L — HIGH (ref 40–120)
ALT FLD-CCNC: 30 U/L — SIGNIFICANT CHANGE UP (ref 4–33)
ANION GAP SERPL CALC-SCNC: 11 MMOL/L — SIGNIFICANT CHANGE UP (ref 7–14)
AST SERPL-CCNC: 65 U/L — HIGH (ref 4–32)
BASOPHILS # BLD AUTO: 0.03 K/UL — SIGNIFICANT CHANGE UP (ref 0–0.2)
BASOPHILS NFR BLD AUTO: 0.4 % — SIGNIFICANT CHANGE UP (ref 0–2)
BILIRUB SERPL-MCNC: 0.7 MG/DL — SIGNIFICANT CHANGE UP (ref 0.2–1.2)
BUN SERPL-MCNC: 9 MG/DL — SIGNIFICANT CHANGE UP (ref 7–23)
CALCIUM SERPL-MCNC: 9.6 MG/DL — SIGNIFICANT CHANGE UP (ref 8.4–10.5)
CHLORIDE SERPL-SCNC: 99 MMOL/L — SIGNIFICANT CHANGE UP (ref 98–107)
CO2 SERPL-SCNC: 27 MMOL/L — SIGNIFICANT CHANGE UP (ref 22–31)
CREAT SERPL-MCNC: 0.55 MG/DL — SIGNIFICANT CHANGE UP (ref 0.5–1.3)
EGFR: 108 ML/MIN/1.73M2 — SIGNIFICANT CHANGE UP
EOSINOPHIL # BLD AUTO: 0.13 K/UL — SIGNIFICANT CHANGE UP (ref 0–0.5)
EOSINOPHIL NFR BLD AUTO: 1.8 % — SIGNIFICANT CHANGE UP (ref 0–6)
ESTIMATED AVERAGE GLUCOSE: 117 — SIGNIFICANT CHANGE UP
FERRITIN SERPL-MCNC: 245 NG/ML — HIGH (ref 15–150)
GLUCOSE BLDC GLUCOMTR-MCNC: 129 MG/DL — HIGH (ref 70–99)
GLUCOSE BLDC GLUCOMTR-MCNC: 132 MG/DL — HIGH (ref 70–99)
GLUCOSE BLDC GLUCOMTR-MCNC: 147 MG/DL — HIGH (ref 70–99)
GLUCOSE BLDC GLUCOMTR-MCNC: 209 MG/DL — HIGH (ref 70–99)
GLUCOSE SERPL-MCNC: 126 MG/DL — HIGH (ref 70–99)
HCT VFR BLD CALC: 35.1 % — SIGNIFICANT CHANGE UP (ref 34.5–45)
HGB BLD-MCNC: 11.3 G/DL — LOW (ref 11.5–15.5)
IANC: 4.05 K/UL — SIGNIFICANT CHANGE UP (ref 1.8–7.4)
IMM GRANULOCYTES NFR BLD AUTO: 0.3 % — SIGNIFICANT CHANGE UP (ref 0–1.5)
INR BLD: 1.41 RATIO — HIGH (ref 0.88–1.16)
IRON SATN MFR SERPL: 17 % — SIGNIFICANT CHANGE UP (ref 14–50)
IRON SATN MFR SERPL: 43 UG/DL — SIGNIFICANT CHANGE UP (ref 30–160)
LYMPHOCYTES # BLD AUTO: 2.41 K/UL — SIGNIFICANT CHANGE UP (ref 1–3.3)
LYMPHOCYTES # BLD AUTO: 33.1 % — SIGNIFICANT CHANGE UP (ref 13–44)
MAGNESIUM SERPL-MCNC: 2.1 MG/DL — SIGNIFICANT CHANGE UP (ref 1.6–2.6)
MCHC RBC-ENTMCNC: 24.4 PG — LOW (ref 27–34)
MCHC RBC-ENTMCNC: 32.2 GM/DL — SIGNIFICANT CHANGE UP (ref 32–36)
MCV RBC AUTO: 75.8 FL — LOW (ref 80–100)
MONOCYTES # BLD AUTO: 0.64 K/UL — SIGNIFICANT CHANGE UP (ref 0–0.9)
MONOCYTES NFR BLD AUTO: 8.8 % — SIGNIFICANT CHANGE UP (ref 2–14)
MRSA PCR RESULT.: SIGNIFICANT CHANGE UP
NEUTROPHILS # BLD AUTO: 4.05 K/UL — SIGNIFICANT CHANGE UP (ref 1.8–7.4)
NEUTROPHILS NFR BLD AUTO: 55.6 % — SIGNIFICANT CHANGE UP (ref 43–77)
NRBC # BLD: 0 /100 WBCS — SIGNIFICANT CHANGE UP
NRBC # FLD: 0 K/UL — SIGNIFICANT CHANGE UP
PHOSPHATE SERPL-MCNC: 3.7 MG/DL — SIGNIFICANT CHANGE UP (ref 2.5–4.5)
PLATELET # BLD AUTO: 142 K/UL — LOW (ref 150–400)
POTASSIUM SERPL-MCNC: 4.6 MMOL/L — SIGNIFICANT CHANGE UP (ref 3.5–5.3)
POTASSIUM SERPL-SCNC: 4.6 MMOL/L — SIGNIFICANT CHANGE UP (ref 3.5–5.3)
PROT SERPL-MCNC: 8.3 G/DL — SIGNIFICANT CHANGE UP (ref 6–8.3)
PROTHROM AB SERPL-ACNC: 16.4 SEC — HIGH (ref 10.5–13.4)
RBC # BLD: 4.63 M/UL — SIGNIFICANT CHANGE UP (ref 3.8–5.2)
RBC # FLD: 14 % — SIGNIFICANT CHANGE UP (ref 10.3–14.5)
S AUREUS DNA NOSE QL NAA+PROBE: SIGNIFICANT CHANGE UP
SODIUM SERPL-SCNC: 137 MMOL/L — SIGNIFICANT CHANGE UP (ref 135–145)
TIBC SERPL-MCNC: 252 UG/DL — SIGNIFICANT CHANGE UP (ref 220–430)
UIBC SERPL-MCNC: 209 UG/DL — SIGNIFICANT CHANGE UP (ref 110–370)
WBC # BLD: 7.28 K/UL — SIGNIFICANT CHANGE UP (ref 3.8–10.5)
WBC # FLD AUTO: 7.28 K/UL — SIGNIFICANT CHANGE UP (ref 3.8–10.5)

## 2022-07-20 PROCEDURE — 88341 IMHCHEM/IMCYTCHM EA ADD ANTB: CPT | Mod: 26

## 2022-07-20 PROCEDURE — 88305 TISSUE EXAM BY PATHOLOGIST: CPT | Mod: 26

## 2022-07-20 PROCEDURE — 31645 BRNCHSC W/THER ASPIR 1ST: CPT | Mod: GC

## 2022-07-20 PROCEDURE — 88112 CYTOPATH CELL ENHANCE TECH: CPT | Mod: 26,59

## 2022-07-20 PROCEDURE — 99233 SBSQ HOSP IP/OBS HIGH 50: CPT

## 2022-07-20 PROCEDURE — 88173 CYTOPATH EVAL FNA REPORT: CPT | Mod: 26

## 2022-07-20 PROCEDURE — 31624 DX BRONCHOSCOPE/LAVAGE: CPT | Mod: GC

## 2022-07-20 PROCEDURE — 31652 BRONCH EBUS SAMPLNG 1/2 NODE: CPT | Mod: GC

## 2022-07-20 PROCEDURE — 88342 IMHCHEM/IMCYTCHM 1ST ANTB: CPT | Mod: 26

## 2022-07-20 PROCEDURE — 31641 BRONCHOSCOPY TREAT BLOCKAGE: CPT | Mod: GC

## 2022-07-20 RX ORDER — ACETAMINOPHEN 500 MG
650 TABLET ORAL ONCE
Refills: 0 | Status: COMPLETED | OUTPATIENT
Start: 2022-07-20 | End: 2022-07-20

## 2022-07-20 RX ADMIN — GABAPENTIN 300 MILLIGRAM(S): 400 CAPSULE ORAL at 18:52

## 2022-07-20 RX ADMIN — Medication 650 MILLIGRAM(S): at 18:56

## 2022-07-20 RX ADMIN — ATORVASTATIN CALCIUM 10 MILLIGRAM(S): 80 TABLET, FILM COATED ORAL at 22:00

## 2022-07-20 RX ADMIN — GABAPENTIN 300 MILLIGRAM(S): 400 CAPSULE ORAL at 05:43

## 2022-07-20 RX ADMIN — Medication 650 MILLIGRAM(S): at 18:53

## 2022-07-20 NOTE — DIETITIAN INITIAL EVALUATION ADULT - NS FNS WEIGHT CHANGE REASON
Usual weight reported 110lbs (11/2021), down to 46.6kg/102.5lbs (1/2022) and now 45.9kg/101.1lbs indicative of/unintentional

## 2022-07-20 NOTE — BRIEF OPERATIVE NOTE - NSICDXBRIEFPROCEDURE_GEN_ALL_CORE_FT
PROCEDURES:  Bronchoscopy with endobronchial ultrasound and sampling of 1 or 2 lymph nodes 20-Jul-2022 16:38:55  Neal Cosme  Bronchoalveolar lavage 20-Jul-2022 16:39:03  Neal Cosme  Bronchoscopy, with argon plasma coagulation 20-Jul-2022 16:39:15  Neal Cosme

## 2022-07-20 NOTE — DIETITIAN INITIAL EVALUATION ADULT - PERTINENT MEDS FT
MEDICATIONS  (STANDING):  atorvastatin 10 milliGRAM(s) Oral at bedtime  chlorhexidine 2% Cloths 1 Application(s) Topical daily  dextrose 5%. 1000 milliLiter(s) (100 mL/Hr) IV Continuous <Continuous>  dextrose 5%. 1000 milliLiter(s) (50 mL/Hr) IV Continuous <Continuous>  dextrose 50% Injectable 25 Gram(s) IV Push once  dextrose 50% Injectable 12.5 Gram(s) IV Push once  dextrose 50% Injectable 25 Gram(s) IV Push once  gabapentin 300 milliGRAM(s) Oral two times a day  glucagon  Injectable 1 milliGRAM(s) IntraMuscular once  insulin lispro (ADMELOG) corrective regimen sliding scale   SubCutaneous three times a day before meals    MEDICATIONS  (PRN):  acetaminophen     Tablet .. 650 milliGRAM(s) Oral every 6 hours PRN Temp greater or equal to 38C (100.4F), Mild Pain (1 - 3)  dextrose Oral Gel 15 Gram(s) Oral once PRN Blood Glucose LESS THAN 70 milliGRAM(s)/deciliter  melatonin 3 milliGRAM(s) Oral at bedtime PRN Insomnia

## 2022-07-20 NOTE — DIETITIAN INITIAL EVALUATION ADULT - ADD RECOMMEND
1. Monitor weights, labs, BM's, skin integrity, p.o. intake.   2. Honor food and beverage preferences within diet restriction of patient in an effort to maximize level of nutrient intake.

## 2022-07-20 NOTE — DIETITIAN INITIAL EVALUATION ADULT - OTHER INFO
Patient is NPO today for procedure. Reports having <50% of meals 7/19. Patient denies any nausea/vomiting/diarrhea/constipation or difficulty chewing and swallowing. NKFA. Observes Halal diet and same implemented. Importance of having small frequent po intake as tolerated of nutrient and protein dense foods suggested. Strategies to increase kcal and protein intake discussed.

## 2022-07-20 NOTE — DIETITIAN INITIAL EVALUATION ADULT - REASON FOR ADMISSION
Chest pain  56 y/o male with medical history of ampullary carcinoma s/p whipple in 2019, HLD, DMII, presenting for weeks of left sided chest pain radiating to the back, found to have YUNI masslike lesion concerning for malignancy per chart review.

## 2022-07-20 NOTE — DIETITIAN INITIAL EVALUATION ADULT - NS FNS DIET ORDER
Diet, Consistent Carbohydrate/No Snacks:   DASH/TLC {Sodium & Cholesterol Restricted} (DASH)  Jordin (07-19-22 @ 14:06)  Diet, NPO after Midnight:      NPO Start Date: 19-Jul-2022,   NPO Start Time: 23:59  Except Medications (07-19-22 @ 13:42)

## 2022-07-20 NOTE — BRIEF OPERATIVE NOTE - OPERATION/FINDINGS
Airway inspection revealed minimal secretions bilaterally with grossly normal mucosa and no endobronchial lesions on right side.  Left side exam revealed erythematous left upper lobe mucosa with evidence of extrinsic compression.  EBUS then performed with transbronchial fine needle aspiration of stations 4L and 4 R lymph nodes.  Afterwards, argon plasma coagulation was done to left upper lobe.  Afterwards, bronchoalveolar lavage of left upper lobe was performed along with therapeutic aspiration of secretions prior to withdrawal of bronchoscope.  Patient tolerated procedure well. Airway inspection revealed minimal secretions bilaterally with grossly normal mucosa and no endobronchial lesions on right side.  Left side exam revealed erythematous left upper lobe mucosa with evidence of extrinsic compression.  EBUS then performed with transbronchial fine needle aspiration of stations 4L and 4 R lymph nodes.  Afterwards, argon plasma coagulation was done to left upper lobe.  Afterwards, bronchoalveolar lavage of left upper lobe was performed along with therapeutic aspiration of secretions prior to withdrawal of bronchoscope.  Patient tolerated procedure well.    #[id number]

## 2022-07-20 NOTE — DIETITIAN INITIAL EVALUATION ADULT - ORAL NUTRITION SUPPLEMENTS
Recommend add Glucerna Therapeutic Nutrition 240mls 3x daily (660kcals, 30g protein) for added calories and protein.

## 2022-07-20 NOTE — DIETITIAN INITIAL EVALUATION ADULT - PERTINENT LABORATORY DATA
07-20    137  |  99  |  9   ----------------------------<  126<H>  4.6   |  27  |  0.55    Ca    9.6      20 Jul 2022 06:20  Phos  3.7     07-20  Mg     2.10     07-20    TPro  8.3  /  Alb  3.9  /  TBili  0.7  /  DBili  x   /  AST  65<H>  /  ALT  30  /  AlkPhos  138<H>  07-20  POCT Blood Glucose.: 129 mg/dL (07-20-22 @ 12:02)  A1C with Estimated Average Glucose Result: 5.7 % (07-20-22 @ 06:20)  A1C with Estimated Average Glucose Result: 7.0 % (01-07-22 @ 15:46)  A1C with Estimated Average Glucose Result: 8.4 % (08-05-21 @ 14:47)

## 2022-07-20 NOTE — PROGRESS NOTE ADULT - ATTENDING COMMENTS
Patient seen and examined at the bedside. Daughter served as her Polish tranlator. Risks and benefits and details about procedure discussed. Agreeable to proceed. Underwent flex bronch with ebus guided sampling of enlarged node and apc assisted ablation of mucosal infiltration/erythema of the YUNI likely causing intermittent hemoptysis. Tolerated well. Further plans based on results.

## 2022-07-20 NOTE — PROGRESS NOTE ADULT - ASSESSMENT
58 yo F PMH DM, HTN, and stage III ampullary adenocarcinoma (dx 12/2018; s/p whipple pancreaticoduodenectomy T3N1b and 8 cycles Gemzar/Xeloda [4/30-10/21/2019] now on surveillance) presenting for subacute hemoptysis likely 2/2 YUNI mass. Interventional pulmonary consulted for bronchoscopy and biopsy.     #YUNI Mass  - imaging reviewed, concern  for invasion of left pulmonary artery which may be source of hemoptysis/bleeding. PET/CT w/ uptake in YUNI as well as 10L lymph node  -patient also noted to have mediastinal lymphadenopathy on CT--DDx includes reactive from post obstructive pneumonia vs malignancy (either metastatic or new lung primary)  - patient s/p bronchoscopy with EBUS and transbronchial needle aspiration of station 4L and 4R, argon plasma coagulation, and bronchoalveolar lavage of YUNI (please refer to brief operative note for more detail)     #Hemoptysis  - suspect 2/2 to YUNI mass as above     Recommendations:  - provide sputum cup to quantify hemoptysis. If >100cc/hr or 250cc/day please notify MICU  - continue to hold AC given recent hemoptysis/procedure   - f/u cytology for lymph node stations 4L and 4R fine needle aspiration  - f/u BAL studies  - provide and encourage incentive spirometry  - ensure patient has PT/OT  - f/u hem onc recs  - palliative consult    Patient seen and discussed w/Dr. Deepika Cosme [id number]  [id number]

## 2022-07-20 NOTE — DIETITIAN INITIAL EVALUATION ADULT - ORAL INTAKE PTA/DIET HISTORY
Patient is Romanian Speaking and RD able to communicate in patient preferred language. Patient reports progressively decreased po intake over last 7months, consuming <50-75% of meals relates to lack of desire to eat, altered taste and smell.

## 2022-07-20 NOTE — DIETITIAN NUTRITION RISK NOTIFICATION - TREATMENT: THE FOLLOWING DIET HAS BEEN RECOMMENDED
Diet, Consistent Carbohydrate/No Snacks:   DASH/TLC {Sodium & Cholesterol Restricted} (DASH)  Jordin (07-19-22 @ 14:06) [Active]  Diet, NPO after Midnight:      NPO Start Date: 19-Jul-2022,   NPO Start Time: 23:59  Except Medications (07-19-22 @ 13:42) [Active]

## 2022-07-21 ENCOUNTER — TRANSCRIPTION ENCOUNTER (OUTPATIENT)
Age: 57
End: 2022-07-21

## 2022-07-21 LAB
ALBUMIN SERPL ELPH-MCNC: 3 G/DL — LOW (ref 3.3–5)
ALP SERPL-CCNC: 82 U/L — SIGNIFICANT CHANGE UP (ref 40–120)
ALT FLD-CCNC: 13 U/L — SIGNIFICANT CHANGE UP (ref 4–33)
ANION GAP SERPL CALC-SCNC: 7 MMOL/L — SIGNIFICANT CHANGE UP (ref 7–14)
AST SERPL-CCNC: 27 U/L — SIGNIFICANT CHANGE UP (ref 4–32)
BILIRUB SERPL-MCNC: 0.4 MG/DL — SIGNIFICANT CHANGE UP (ref 0.2–1.2)
BLD GP AB SCN SERPL QL: NEGATIVE — SIGNIFICANT CHANGE UP
BUN SERPL-MCNC: 21 MG/DL — SIGNIFICANT CHANGE UP (ref 7–23)
CALCIUM SERPL-MCNC: 9 MG/DL — SIGNIFICANT CHANGE UP (ref 8.4–10.5)
CHLORIDE SERPL-SCNC: 112 MMOL/L — HIGH (ref 98–107)
CO2 SERPL-SCNC: 23 MMOL/L — SIGNIFICANT CHANGE UP (ref 22–31)
CREAT SERPL-MCNC: 0.53 MG/DL — SIGNIFICANT CHANGE UP (ref 0.5–1.3)
EGFR: 108 ML/MIN/1.73M2 — SIGNIFICANT CHANGE UP
GLUCOSE BLDC GLUCOMTR-MCNC: 141 MG/DL — HIGH (ref 70–99)
GLUCOSE BLDC GLUCOMTR-MCNC: 159 MG/DL — HIGH (ref 70–99)
GLUCOSE BLDC GLUCOMTR-MCNC: 227 MG/DL — HIGH (ref 70–99)
GLUCOSE BLDC GLUCOMTR-MCNC: 230 MG/DL — HIGH (ref 70–99)
GLUCOSE SERPL-MCNC: 94 MG/DL — SIGNIFICANT CHANGE UP (ref 70–99)
GRAM STN FLD: SIGNIFICANT CHANGE UP
HCT VFR BLD CALC: 23 % — LOW (ref 34.5–45)
HCT VFR BLD CALC: 35.3 % — SIGNIFICANT CHANGE UP (ref 34.5–45)
HCT VFR BLD CALC: 35.7 % — SIGNIFICANT CHANGE UP (ref 34.5–45)
HGB BLD-MCNC: 11.4 G/DL — LOW (ref 11.5–15.5)
HGB BLD-MCNC: 11.6 G/DL — SIGNIFICANT CHANGE UP (ref 11.5–15.5)
HGB BLD-MCNC: 6.8 G/DL — CRITICAL LOW (ref 11.5–15.5)
MAGNESIUM SERPL-MCNC: 2 MG/DL — SIGNIFICANT CHANGE UP (ref 1.6–2.6)
MCHC RBC-ENTMCNC: 24.6 PG — LOW (ref 27–34)
MCHC RBC-ENTMCNC: 24.7 PG — LOW (ref 27–34)
MCHC RBC-ENTMCNC: 29.6 GM/DL — LOW (ref 32–36)
MCHC RBC-ENTMCNC: 32.1 PG — SIGNIFICANT CHANGE UP (ref 27–34)
MCHC RBC-ENTMCNC: 32.3 GM/DL — SIGNIFICANT CHANGE UP (ref 32–36)
MCHC RBC-ENTMCNC: 32.5 GM/DL — SIGNIFICANT CHANGE UP (ref 32–36)
MCV RBC AUTO: 108.5 FL — HIGH (ref 80–100)
MCV RBC AUTO: 76.1 FL — LOW (ref 80–100)
MCV RBC AUTO: 76.2 FL — LOW (ref 80–100)
NIGHT BLUE STAIN TISS: SIGNIFICANT CHANGE UP
NRBC # BLD: 0 /100 WBCS — SIGNIFICANT CHANGE UP
NRBC # FLD: 0 K/UL — SIGNIFICANT CHANGE UP
PHOSPHATE SERPL-MCNC: 2.5 MG/DL — SIGNIFICANT CHANGE UP (ref 2.5–4.5)
PLATELET # BLD AUTO: 105 K/UL — LOW (ref 150–400)
PLATELET # BLD AUTO: 118 K/UL — LOW (ref 150–400)
PLATELET # BLD AUTO: 130 K/UL — LOW (ref 150–400)
POTASSIUM SERPL-MCNC: 4.1 MMOL/L — SIGNIFICANT CHANGE UP (ref 3.5–5.3)
POTASSIUM SERPL-SCNC: 4.1 MMOL/L — SIGNIFICANT CHANGE UP (ref 3.5–5.3)
PROT SERPL-MCNC: 5.3 G/DL — LOW (ref 6–8.3)
RBC # BLD: 2.12 M/UL — LOW (ref 3.8–5.2)
RBC # BLD: 4.63 M/UL — SIGNIFICANT CHANGE UP (ref 3.8–5.2)
RBC # BLD: 4.69 M/UL — SIGNIFICANT CHANGE UP (ref 3.8–5.2)
RBC # FLD: 13.8 % — SIGNIFICANT CHANGE UP (ref 10.3–14.5)
RBC # FLD: 13.8 % — SIGNIFICANT CHANGE UP (ref 10.3–14.5)
RBC # FLD: 23.4 % — HIGH (ref 10.3–14.5)
RH IG SCN BLD-IMP: POSITIVE — SIGNIFICANT CHANGE UP
SODIUM SERPL-SCNC: 142 MMOL/L — SIGNIFICANT CHANGE UP (ref 135–145)
SPECIMEN SOURCE: SIGNIFICANT CHANGE UP
SPECIMEN SOURCE: SIGNIFICANT CHANGE UP
WBC # BLD: 10 K/UL — SIGNIFICANT CHANGE UP (ref 3.8–10.5)
WBC # BLD: 3.1 K/UL — LOW (ref 3.8–10.5)
WBC # BLD: 7.05 K/UL — SIGNIFICANT CHANGE UP (ref 3.8–10.5)
WBC # FLD AUTO: 10 K/UL — SIGNIFICANT CHANGE UP (ref 3.8–10.5)
WBC # FLD AUTO: 3.1 K/UL — LOW (ref 3.8–10.5)
WBC # FLD AUTO: 7.05 K/UL — SIGNIFICANT CHANGE UP (ref 3.8–10.5)

## 2022-07-21 PROCEDURE — 74182 MRI ABDOMEN W/CONTRAST: CPT | Mod: 26

## 2022-07-21 PROCEDURE — 99233 SBSQ HOSP IP/OBS HIGH 50: CPT | Mod: GC

## 2022-07-21 PROCEDURE — 99232 SBSQ HOSP IP/OBS MODERATE 35: CPT

## 2022-07-21 RX ADMIN — GABAPENTIN 300 MILLIGRAM(S): 400 CAPSULE ORAL at 05:31

## 2022-07-21 RX ADMIN — GABAPENTIN 300 MILLIGRAM(S): 400 CAPSULE ORAL at 17:20

## 2022-07-21 RX ADMIN — CHLORHEXIDINE GLUCONATE 1 APPLICATION(S): 213 SOLUTION TOPICAL at 17:21

## 2022-07-21 RX ADMIN — Medication 4: at 08:49

## 2022-07-21 RX ADMIN — Medication 4: at 12:45

## 2022-07-21 RX ADMIN — ATORVASTATIN CALCIUM 10 MILLIGRAM(S): 80 TABLET, FILM COATED ORAL at 21:06

## 2022-07-21 NOTE — PROGRESS NOTE ADULT - ASSESSMENT
58 yo F PMH DM, HTN, and stage III ampullary adenocarcinoma (dx 12/2018; s/p whipple pancreaticoduodenectomy T3N1b and 8 cycles Gemzar/Xeloda [4/30-10/21/2019] now on surveillance) presenting for subacute hemoptysis likely 2/2 YUNI mass now s/p bronchoscopy and biopsy with improved hemoptysis.     #YUNI Mass  - imaging reviewed, concern  for invasion of left pulmonary artery which may be source of hemoptysis/bleeding. PET/CT w/ uptake in YUNI as well as 10L lymph node  -patient also noted to have mediastinal lymphadenopathy on CT--DDx includes reactive from post obstructive pneumonia vs malignancy (either metastatic or new lung primary)  - patient s/p bronchoscopy with EBUS and transbronchial needle aspiration of station 4L and 4R, argon plasma coagulation, and bronchoalveolar lavage of YUNI (please refer to brief operative note for more detail)     #Hemoptysis  - suspect 2/2 to YUNI mass as above     Recommendations:  - provide sputum cup to quantify hemoptysis. If >100cc/hr or 250cc/day please notify MICU  - continue to hold AC given recent hemoptysis/procedure   - f/u cytology for lymph node stations 4L and 4R fine needle aspiration  - f/u BAL studies  - provide and encourage incentive spirometry  - ensure patient has PT/OT  - f/u hem onc recs  - palliative consult    will need to follow up with Dr. Poe as an outpatient    Ned Garrett MD  [facility] PGY4  [facility] [address], [facility] [phone]         None

## 2022-07-21 NOTE — PHYSICAL THERAPY INITIAL EVALUATION ADULT - ADDITIONAL COMMENTS
Pt lives in a private home with her daughter and spouse; resides on first floor with 8 steps to enter. Pt reports she required assistance with ambulation via cane, self-care, and ADLs. Has home health aide; however, not sure of hours.

## 2022-07-21 NOTE — PHYSICAL THERAPY INITIAL EVALUATION ADULT - DIAGNOSIS, PT EVAL
Upon evaluation, pt presents with impairments in functional mobility, strength, balance, gait, and endurance. Pt would benefit from skilled PT services in the acute care setting to address impairments to facilitate return to prior level of function.

## 2022-07-21 NOTE — DISCHARGE NOTE PROVIDER - CARE PROVIDER_API CALL
Filemon Poe)  Critical Care Medicine; Internal Medicine; Pulmonary Disease  [address] [place] [address]  Phone: [phone]  Fax: [phone]  Follow Up Time:

## 2022-07-21 NOTE — PROGRESS NOTE ADULT - TIME BILLING
risks and benefits of the procedure, alternative procedures as well as further management plan. Complex patient requiring services. Services provided were separate in additional from general pulmonary services due to critical nature of patient and interventions requires. Time spent separate from time spent doing any procedures.
review of laboratory data, radiology results, discussion with primary team\patient, and monitoring for potential decompensation. Interventions were performed as documented above.

## 2022-07-21 NOTE — PHYSICAL THERAPY INITIAL EVALUATION ADULT - IMPAIRMENTS CONTRIBUTING TO GAIT DEVIATIONS, PT EVAL
impaired endurance/impaired balance/impaired coordination/impaired motor control/impaired postural control/decreased strength

## 2022-07-21 NOTE — PHYSICAL THERAPY INITIAL EVALUATION ADULT - DISCHARGE DISPOSITION, PT EVAL
Recommend discharge to rehabilitation to address impairments to return to prior level of function. Continue to follow PT notes.

## 2022-07-21 NOTE — OCCUPATIONAL THERAPY INITIAL EVALUATION ADULT - PERTINENT HX OF CURRENT PROBLEM, REHAB EVAL
Pt is a 57 year old female presenting with left sided chest pain radiating to the back x4 weeks. CTA negative for PE, showed YUNI masslike lesion.

## 2022-07-21 NOTE — DISCHARGE NOTE PROVIDER - NSDCMRMEDTOKEN_GEN_ALL_CORE_FT
acetaminophen 325 mg oral tablet: 3 tab(s) orally every 6 hours  atorvastatin 10 mg oral tablet: 1 tab(s) orally once a day  Colace 2-in-1 50 mg-8.6 mg oral tablet: 3 tab(s) orally once a day (at bedtime)  gabapentin 300 mg oral capsule: 1 cap(s) orally 2 times a day  glimepiride 1 mg oral tablet: 1 tab(s) orally once a day  Januvia 100 mg oral tablet: 1 tab(s) orally once a day  metFORMIN 500 mg oral tablet: 1 tab(s) orally 2 times a day  montelukast 10 mg oral tablet: 1 tab(s) orally once a day (at bedtime)  Multiple Vitamins oral capsule: 1 cap(s) orally once a day. Last dose 1/13/22.  Vitamin D3 125 mcg (5000 intl units) oral capsule: 1 cap(s) orally once a day   acetaminophen 325 mg oral tablet: 3 tab(s) orally every 6 hours  atorvastatin 10 mg oral tablet: 1 tab(s) orally once a day  Colace 2-in-1 50 mg-8.6 mg oral tablet: 3 tab(s) orally once a day (at bedtime)  gabapentin 300 mg oral capsule: 1 cap(s) orally 2 times a day  glimepiride 1 mg oral tablet: 1 tab(s) orally once a day  Januvia 100 mg oral tablet: 1 tab(s) orally once a day  metFORMIN 500 mg oral tablet: 1 tab(s) orally 2 times a day  montelukast 10 mg oral tablet: 1 tab(s) orally once a day (at bedtime)  Multiple Vitamins oral capsule: 1 cap(s) orally once a day. Last dose 1/13/22.  Physical therapy : three times per week  Vitamin D3 125 mcg (5000 intl units) oral capsule: 1 cap(s) orally once a day   acetaminophen 325 mg oral tablet: 3 tab(s) orally every 6 hours, As Needed Pain  atorvastatin 10 mg oral tablet: 1 tab(s) orally once a day  Colace 2-in-1 50 mg-8.6 mg oral tablet: 3 tab(s) orally once a day (at bedtime)  Dulcolax Laxative 5 mg oral delayed release tablet: 1 tab(s) orally two times a day, As Needed constipation. This is an over the counter medication  gabapentin 300 mg oral capsule: 1 cap(s) orally 2 times a day  glimepiride 1 mg oral tablet: 1 tab(s) orally once a day  Januvia 100 mg oral tablet: 1 tab(s) orally once a day  metFORMIN 500 mg oral tablet: 1 tab(s) orally 2 times a day  MiraLax oral powder for reconstitution: 17 gram(s) orally once a day. Hold for losoe stool. This is an over the counter medication for constipation  montelukast 10 mg oral tablet: 1 tab(s) orally once a day (at bedtime)  Multiple Vitamins oral capsule: 1 cap(s) orally once a day  Physical therapy : three times per week  Vitamin D3 125 mcg (5000 intl units) oral capsule: 1 cap(s) orally once a day   acetaminophen 325 mg oral tablet: 3 tab(s) orally every 6 hours, As Needed Pain  atorvastatin 10 mg oral tablet: 1 tab(s) orally once a day  Colace 2-in-1 50 mg-8.6 mg oral tablet: 3 tab(s) orally once a day (at bedtime)  Dulcolax Laxative 5 mg oral delayed release tablet: 1 tab(s) orally two times a day, As Needed constipation. This is an over the counter medication  gabapentin 300 mg oral capsule: 1 cap(s) orally 2 times a day  Januvia 100 mg oral tablet: 1 tab(s) orally once a day  metFORMIN 500 mg oral tablet: 1 tab(s) orally 2 times a day  MiraLax oral powder for reconstitution: 17 gram(s) orally once a day. Hold for losoe stool. This is an over the counter medication for constipation  montelukast 10 mg oral tablet: 1 tab(s) orally once a day (at bedtime)  Multiple Vitamins oral capsule: 1 cap(s) orally once a day  Physical therapy : three times per week  Robitussin 100 mg/5 mL oral liquid: 10 milliliter(s) orally every 4 hours, As Needed cough  Vitamin D3 125 mcg (5000 intl units) oral capsule: 1 cap(s) orally once a day   acetaminophen 325 mg oral tablet: 3 tab(s) orally every 6 hours, As Needed Pain  atorvastatin 10 mg oral tablet: 1 tab(s) orally once a day  Colace 2-in-1 50 mg-8.6 mg oral tablet: 3 tab(s) orally once a day (at bedtime)  Dulcolax Laxative 5 mg oral delayed release tablet: 1 tab(s) orally two times a day, As Needed constipation. This is an over the counter medication  gabapentin 300 mg oral capsule: 1 cap(s) orally 2 times a day  Januvia 100 mg oral tablet: 1 tab(s) orally once a day  metFORMIN 500 mg oral tablet: 1 tab(s) orally 2 times a day  MiraLax oral powder for reconstitution: 17 gram(s) orally once a day. Hold for losoe stool. This is an over the counter medication for constipation  montelukast 10 mg oral tablet: 1 tab(s) orally once a day (at bedtime)  Multiple Vitamins oral capsule: 1 cap(s) orally once a day  Physical therapy : three times per week  Robitussin 100 mg/5 mL oral liquid: 10 milliliter(s) orally every 4 hours, As Needed cough. This is an over the counter medication  Vitamin D3 125 mcg (5000 intl units) oral capsule: 1 cap(s) orally once a day

## 2022-07-21 NOTE — PHYSICAL THERAPY INITIAL EVALUATION ADULT - PERTINENT HX OF CURRENT PROBLEM, REHAB EVAL
Pt is a 57 year old female presenting with left sided chest pain radiating to the back x4 weeks. CTA negative for PE, showed YUNI masslike lesion. Admitted for bronchoscopy

## 2022-07-21 NOTE — DISCHARGE NOTE PROVIDER - NSDCFUSCHEDAPPT_GEN_ALL_CORE_FT
Katty Navarro  [facility]  [facility]  Scheduled Appointment: 08/11/2022    Dillon Bourne  [facility]  [facility] [address] [facility]  Scheduled Appointment: 09/27/2022

## 2022-07-21 NOTE — DISCHARGE NOTE PROVIDER - NSDCCPCAREPLAN_GEN_ALL_CORE_FT
PRINCIPAL DISCHARGE DIAGNOSIS  Diagnosis: Mass of upper lobe of left lung  Assessment and Plan of Treatment: You have a mass in your left lung. Pulmonary was consulted You underwent a bronchoscopy and a biopsy was taken. Biopsy results are still pending. Please follow up out patient with pulmonary for results. You may use robitussin as needed for cough.      SECONDARY DISCHARGE DIAGNOSES  Diagnosis: Ampullary carcinoma  Assessment and Plan of Treatment: You have a history of ampullary cancer and underewnt resection in 2019. You were evaluated by oncology during hospitalization. Please follow up outpatient for further monitoring and care.    Diagnosis: Type 2 diabetes mellitus without complication, without long-term current use of insulin  Assessment and Plan of Treatment: Your a1c was 5.7 (very well controlled), thus your Glimepiride was discontinued upon discharge. Continue with metformin and januvia. Continue consistent carbohydrate diet.  Monitor blood glucose levels throughout the day before meals and at bedtime.  Record blood sugars and bring to outpatient providers appointment in order to be reviewed by your doctor for management modifications.  Be aware of diabetes management symptoms including feeling cool and clammy may be related to low glucose levels.  Feeling hot and dry may indicate high glucose levels.  If  you feel these symptoms, check your blood sugar.  Make regular podiatry appointments in order to have feet checked for wounds and toe nails cut by a doctor to prevent infections.    Diagnosis: Hyperlipidemia  Assessment and Plan of Treatment: Continue cholesterol control medications. Continue DASH diet. Follow up with your PCP within 1 week of discharge for further management and monitoring of lipid and cholesterol panels. Please call to make an appointment

## 2022-07-21 NOTE — DISCHARGE NOTE PROVIDER - NSDCCAREPROVSEEN_GEN_ALL_CORE_FT
1. Do not take the Xarelto  2. Return to the Emergency room if your symptoms worsens or do not resolve  3.  Follow up with your physician in the next 7 days Sammy Ramos  [facility]

## 2022-07-21 NOTE — DISCHARGE NOTE PROVIDER - HOSPITAL COURSE
57 y.o female with hx of ampullary carcinoma s/p Whipple in 2019, HLD, DM II, presenting for 4 weeks of left sided chest pain radiating to the back, found to have a YUNI masslike lesion concerning for malignancy.      Problem/Plan - 1:  ·  Problem: Mass of upper lobe of left lung.   ·  Plan: Presented with left sided chest pain as above, mass like consolidation noted on PET CT 7/1/22 and on CT 7/18. S/p 5 days of levofloxacin. Concerning for new or recurrent malignancy.   -Oncology and Pulmonary following, appreciate recs  -plan was for bronchoscopy yesterday, unfortunately patient had a piece of bread, now awaiting re-scheduling of bronch for today  -hold off on Abx since no other signs of infection  -follow up bronch results.     Problem/Plan - 2:  ·  Problem: Thrombocytopenia.   ·  Plan: chronic mild, platelets 125, with mild AST elevation and INR. Prior CT abdomen with liver nodular countor  -check RUQ ultrasound to eval for cirrhosis  -also lesion on liver on PET/CT- recommending MRI of the liver.     Problem/Plan - 3:  ·  Problem: Anemia.   ·  Plan: chronic stable, microcytic  -check iron panel, ferritin.     Problem/Plan - 4:  ·  Problem: Ampullary carcinoma.   ·  Plan: s/p Whipple 2019  -noted, oncology following.     Problem/Plan - 5:  ·  Problem: Type 2 diabetes mellitus without complication, without long-term current use of insulin.   ·  Plan: on home orals: januvia, metformin, glimepiride  -a1c 5.7  -Sliding scale.     Problem/Plan - 6:  ·  Problem: Hyperlipidemia.   ·  Plan: -c/w home atorvastatin.     Problem/Plan - 7:  ·  Problem: Prophylactic measure.   ·  Plan: -dvt ppx.   57 y.o female with hx of ampullary carcinoma s/p Whipple in 2019, HLD, DM II, presenting for 4 weeks of left sided chest pain radiating to the back, found to have a YUNI masslike lesion concerning for malignancy.     Hospital course:     Mass of upper lobe of left lung.   -Presented with left sided chest pain as above, mass like consolidation noted on PET CT 7/1/22 and on CT 7/18. Concerning for new or recurrent malignancy.   -Oncology and Pulmonary consulted   -S/p bronchoscopy, biopsy obtained and results pending. Pt to follow up out patient for results     Thrombocytopenia.   -chronic mild, platelets 125, with mild AST elevation and INR.  -Prior CT abdomen with liver nodular countor  -RUQ with coarsened liver which may represent cirrhosis --> Nodular hepatic imaging present in 3/10/22 out pt imaging   -also lesion on liver on PET/CT --> thus MRI recommended by Onc: Stable prominent right cardiophrenic lymph nodes and probably pleural nodular enhancement medially at the posterior right costophrenic sulcus Stable Whipple postoperative changes. No imaging evidence of locally recurrent or metastatic disease involving the abdomen. Cirrhosis. No imaging evidence of portal hypertensive change    Ampullary carcinoma.   -s/p Whipple 2019  -oncology consulted - plan for out pt follow up     Type 2 diabetes mellitus without complication, without long-term current use of insulin.   -on home orals: januvia, metformin, glimepiride  -a1c 5.7  -Sliding scale in house     Hyperlipidemia.   -continued with home atorvastatin.    Pt medically stable for discharge on 7/22/22 as per discussion with Dr. Ramos.  Dispo: home with home care / CDPAP 57 y.o female with hx of ampullary carcinoma s/p Whipple in 2019, HLD, DM II, presenting for 4 weeks of left sided chest pain radiating to the back, found to have a YUNI masslike lesion concerning for malignancy.     Hospital course:     Mass of upper lobe of left lung.   -Presented with left sided chest pain as above, mass like consolidation noted on PET CT 7/1/22 and on CT 7/18. Concerning for new or recurrent malignancy.   -Oncology and Pulmonary consulted   -S/p bronchoscopy, biopsy obtained and results pending. Pt to follow up out patient for results     Thrombocytopenia.   -chronic mild, platelets 125, with mild AST elevation and INR.  -Prior CT abdomen with liver nodular countor  -RUQ with coarsened liver which may represent cirrhosis --> Nodular hepatic imaging present in 3/10/22 out pt imaging   -also lesion on liver on PET/CT --> thus MRI recommended by Onc: Stable prominent right cardiophrenic lymph nodes and probably pleural nodular enhancement medially at the posterior right costophrenic sulcus Stable Whipple postoperative changes. No imaging evidence of locally recurrent or metastatic disease involving the abdomen. Cirrhosis. No imaging evidence of portal hypertensive change    Ampullary carcinoma.   -s/p Whipple 2019  -oncology consulted - plan for out pt follow up     Type 2 diabetes mellitus without complication, without long-term current use of insulin.   -on home orals: januvia, metformin, glimepiride  -a1c 5.7  -Sliding scale in house   -Per discussion with attending, will discontinue glimepiride upon discharge     Hyperlipidemia.   -continued with home atorvastatin.    Pt medically stable for discharge on 7/22/22 as per discussion with Dr. Ramos.  Plan of care discussed with dgtr at bedside on day of discharge - aware to follow up out patient for biopsy results.   Dispo: home with home care / CDPAP

## 2022-07-21 NOTE — DISCHARGE NOTE PROVIDER - NSFOLLOWUPCLINICS_GEN_ALL_ED_FT
[facility]  Hematology/Oncology  [address] [id number]  Phone: [phone]  Fax:

## 2022-07-21 NOTE — PHYSICAL THERAPY INITIAL EVALUATION ADULT - PATIENT PROFILE REVIEW, REHAB EVAL
PT initial evaluation received and chart review completed. Pt agreeable to participate in PT evaluation. Pt cleared by LINDA Plasencia./yes

## 2022-07-21 NOTE — DISCHARGE NOTE PROVIDER - DETAILS OF MALNUTRITION DIAGNOSIS/DIAGNOSES
This patient has been assessed with a concern for Malnutrition and was treated during this hospitalization for the following Nutrition diagnosis/diagnoses:     -  07/20/2022: Severe protein-calorie malnutrition

## 2022-07-21 NOTE — PHYSICAL THERAPY INITIAL EVALUATION ADULT - GENERAL OBSERVATIONS, REHAB EVAL
Upon entry, pt semi-supine in bed in NAD. Pt left seated in bedside chair with all tubes/lines intact, call bell in reach and in NAD. LINDA Plasencia notified.

## 2022-07-21 NOTE — PHYSICAL THERAPY INITIAL EVALUATION ADULT - IMPAIRMENTS FOUND, PT EVAL
aerobic capacity/endurance/decreased midline orientation/ergonomics and body mechanics/gait, locomotion, and balance/gross motor/muscle strength/posture

## 2022-07-21 NOTE — OCCUPATIONAL THERAPY INITIAL EVALUATION ADULT - PLANNED THERAPY INTERVENTIONS, OT EVAL
ADL retraining/balance training/bed mobility training/cognitive, visual perceptual/motor coordination training/ROM/strengthening/stretching/transfer training

## 2022-07-21 NOTE — OCCUPATIONAL THERAPY INITIAL EVALUATION ADULT - ADDITIONAL COMMENTS
Pt has a step in shower with a shower chair and grab bars. Pt ambulates with a walker and receives hand held assistance when climbing stairs. Has home health aide; however, not sure of hours.

## 2022-07-22 ENCOUNTER — TRANSCRIPTION ENCOUNTER (OUTPATIENT)
Age: 57
End: 2022-07-22

## 2022-07-22 VITALS
TEMPERATURE: 98 F | HEART RATE: 64 BPM | RESPIRATION RATE: 16 BRPM | OXYGEN SATURATION: 99 % | DIASTOLIC BLOOD PRESSURE: 55 MMHG | SYSTOLIC BLOOD PRESSURE: 108 MMHG

## 2022-07-22 LAB
ANION GAP SERPL CALC-SCNC: 13 MMOL/L — SIGNIFICANT CHANGE UP (ref 7–14)
BUN SERPL-MCNC: 10 MG/DL — SIGNIFICANT CHANGE UP (ref 7–23)
CALCIUM SERPL-MCNC: 9.4 MG/DL — SIGNIFICANT CHANGE UP (ref 8.4–10.5)
CHLORIDE SERPL-SCNC: 97 MMOL/L — LOW (ref 98–107)
CO2 SERPL-SCNC: 25 MMOL/L — SIGNIFICANT CHANGE UP (ref 22–31)
CREAT SERPL-MCNC: 0.52 MG/DL — SIGNIFICANT CHANGE UP (ref 0.5–1.3)
EGFR: 109 ML/MIN/1.73M2 — SIGNIFICANT CHANGE UP
GLUCOSE BLDC GLUCOMTR-MCNC: 129 MG/DL — HIGH (ref 70–99)
GLUCOSE BLDC GLUCOMTR-MCNC: 190 MG/DL — HIGH (ref 70–99)
GLUCOSE SERPL-MCNC: 130 MG/DL — HIGH (ref 70–99)
HCT VFR BLD CALC: 34.4 % — LOW (ref 34.5–45)
HEMOGLOBIN E%: 27.4 % — HIGH
HEMOGLOBIN INTERPRETATION: SIGNIFICANT CHANGE UP
HGB A MFR BLD: 70.7 % — LOW (ref 95–97.6)
HGB A2 MFR BLD: SIGNIFICANT CHANGE UP % (ref 2.4–3.5)
HGB BLD-MCNC: 11.5 G/DL — SIGNIFICANT CHANGE UP (ref 11.5–15.5)
HGB F MFR BLD: 1.9 % — HIGH (ref 0–1.5)
MAGNESIUM SERPL-MCNC: 1.9 MG/DL — SIGNIFICANT CHANGE UP (ref 1.6–2.6)
MCHC RBC-ENTMCNC: 25.1 PG — LOW (ref 27–34)
MCHC RBC-ENTMCNC: 33.4 GM/DL — SIGNIFICANT CHANGE UP (ref 32–36)
MCV RBC AUTO: 74.9 FL — LOW (ref 80–100)
NRBC # BLD: 0 /100 WBCS — SIGNIFICANT CHANGE UP
NRBC # FLD: 0 K/UL — SIGNIFICANT CHANGE UP
PHOSPHATE SERPL-MCNC: 3.1 MG/DL — SIGNIFICANT CHANGE UP (ref 2.5–4.5)
PLATELET # BLD AUTO: 147 K/UL — LOW (ref 150–400)
POTASSIUM SERPL-MCNC: 4.4 MMOL/L — SIGNIFICANT CHANGE UP (ref 3.5–5.3)
POTASSIUM SERPL-SCNC: 4.4 MMOL/L — SIGNIFICANT CHANGE UP (ref 3.5–5.3)
RBC # BLD: 4.59 M/UL — SIGNIFICANT CHANGE UP (ref 3.8–5.2)
RBC # FLD: 14 % — SIGNIFICANT CHANGE UP (ref 10.3–14.5)
SODIUM SERPL-SCNC: 135 MMOL/L — SIGNIFICANT CHANGE UP (ref 135–145)
WBC # BLD: 9.61 K/UL — SIGNIFICANT CHANGE UP (ref 3.8–10.5)
WBC # FLD AUTO: 9.61 K/UL — SIGNIFICANT CHANGE UP (ref 3.8–10.5)

## 2022-07-22 PROCEDURE — 83020 HEMOGLOBIN ELECTROPHORESIS: CPT | Mod: 26

## 2022-07-22 PROCEDURE — 99239 HOSP IP/OBS DSCHRG MGMT >30: CPT

## 2022-07-22 RX ORDER — GLIMEPIRIDE 1 MG
1 TABLET ORAL
Qty: 0 | Refills: 0 | DISCHARGE

## 2022-07-22 RX ADMIN — Medication 2: at 12:59

## 2022-07-22 RX ADMIN — GABAPENTIN 300 MILLIGRAM(S): 400 CAPSULE ORAL at 06:28

## 2022-07-22 NOTE — DISCHARGE NOTE NURSING/CASE MANAGEMENT/SOCIAL WORK - PATIENT PORTAL LINK FT
You can access the FollowMyHealth Patient Portal offered by [facility] by registering at the following website: [email or web address]. By joining [facility]s FollowMyHealth portal, you will also be able to view your health information using other applications (apps) compatible with our system.

## 2022-07-22 NOTE — PROGRESS NOTE ADULT - PROBLEM SELECTOR PLAN 4
s/p Domingo 2019  -noted, oncology following

## 2022-07-22 NOTE — PROGRESS NOTE ADULT - PROBLEM SELECTOR PLAN 5
on home orals: januvia, metformin, glimepiride  -a1c 5.7  -Sliding scale

## 2022-07-22 NOTE — PROGRESS NOTE ADULT - PROBLEM SELECTOR PLAN 3
chronic stable, microcytic  -check iron panel, ferritin
chronic stable, microcytic
chronic stable, microcytic  -check iron panel, ferritin

## 2022-07-22 NOTE — PROGRESS NOTE ADULT - REASON FOR ADMISSION
YUNI masslike lesion

## 2022-07-22 NOTE — PROGRESS NOTE ADULT - PROBLEM SELECTOR PLAN 1
Presented with left sided chest pain as above, mass like consolidation noted on PET CT 7/1/22 and on CT 7/18. S/p 5 days of levofloxacin. Concerning for new or recurrent malignancy.   -Oncology and Pulmonary following, appreciate recs  -plan was for bronchoscopy yesterday, unfortunately patient had a piece of bread, now awaiting re-scheduling of bronch for today  -hold off on Abx since no other signs of infection  -follow up bronch results
Presented with left sided chest pain as above, mass like consolidation noted on PET CT 7/1/22 and on CT 7/18. S/p 5 days of levofloxacin. Concerning for new or recurrent malignancy.   -Oncology and Pulmonary following, appreciate recs  s/p EBUS and bronchoalveolar lavage of left upper lobe was performed along with therapeutic aspiration of secretions prior to withdrawal of bronchoscope.  -hold off on Abx since no other signs of infection  -follow up bronch results  If no further drop in hb tomorrow, can likely proceed with discharge.
Presented with left sided chest pain as above, mass like consolidation noted on PET CT 7/1/22 and on CT 7/18. S/p 5 days of levofloxacin. Concerning for new or recurrent malignancy.   -Oncology and Pulmonary following, appreciate recs  s/p EBUS and bronchoalveolar lavage of left upper lobe was performed along with therapeutic aspiration of secretions prior to withdrawal of bronchoscope.  -hold off on Abx since no other signs of infection  -follow up bronch results as outpatient.   hb stable can follow up as outpatient

## 2022-07-22 NOTE — DISCHARGE NOTE NURSING/CASE MANAGEMENT/SOCIAL WORK - NSDCPEFALRISK_GEN_ALL_CORE
For information on Fall & Injury Prevention, visit: [email or web address] OR  [email or web address] OR  https://www.cdc.gov/steadi/patient.html

## 2022-07-22 NOTE — PROGRESS NOTE ADULT - SUBJECTIVE AND OBJECTIVE BOX
No post anesthesia complications
Interventional Pulmonology Progress Note     CHIEF COMPLAINT:  hemoptysis     Interval Events:  - patient s/p bronchoscopy with EBUS and lymph node biopsy, argon plasma coagulation, and BAL per interventional pulmonology on 7/20/22    REVIEW OF SYSTEMS:  Constitutional: [ ] negative [ ] fevers [ ] chills [ ] weight loss [ ] weight gain  HEENT: [ ] negative [ ] dry eyes [ ] eye irritation [ ] postnasal drip [ ] nasal congestion  CV: [ ] negative  [ ] chest pain [ ] orthopnea [ ] palpitations [ ] murmur  Resp: [ ] negative [x ] cough [ ] shortness of breath [ ] dyspnea [ ] wheezing [ ] sputum [ ] hemoptysis  GI: [ ] negative [ ] nausea [ ] vomiting [ ] diarrhea [ ] constipation [ ] abd pain [ ] dysphagia   : [ ] negative [ ] dysuria [ ] nocturia [ ] hematuria [ ] increased urinary frequency  Musculoskeletal: [ ] negative [ ] back pain [ ] myalgias [ ] arthralgias [ ] fracture  Skin: [ ] negative [ ] rash [ ] itch  Neurological: [ ] negative [ ] headache [ ] dizziness [ ] syncope [ ] weakness [ ] numbness  Psychiatric: [ ] negative [ ] anxiety [ ] depression  Endocrine: [ ] negative [ ] diabetes [ ] thyroid problem  Hematologic/Lymphatic: [ ] negative [ ] anemia [ ] bleeding problem  Allergic/Immunologic: [ ] negative [ ] itchy eyes [ ] nasal discharge [ ] hives [ ] angioedema  [x ] All other systems negative      OBJECTIVE:  ICU Vital Signs Last 24 Hrs  T(C): 36.7 (20 Jul 2022 12:56), Max: 37.3 (19 Jul 2022 21:51)  T(F): 98.1 (20 Jul 2022 12:56), Max: 99.1 (19 Jul 2022 21:51)  HR: 66 (20 Jul 2022 12:56) (65 - 76)  BP: 104/68 (20 Jul 2022 12:56) (99/64 - 115/74)  BP(mean): --  ABP: --  ABP(mean): --  RR: 16 (20 Jul 2022 12:56) (16 - 20)  SpO2: 99% (20 Jul 2022 12:56) (98% - 99%)    O2 Parameters below as of 20 Jul 2022 12:56  Patient On (Oxygen Delivery Method): room air              CAPILLARY BLOOD GLUCOSE      POCT Blood Glucose.: 129 mg/dL (20 Jul 2022 12:02)      PHYSICAL EXAM:  General: Female, NAD, somewhat lethargic   HEENT: EOMI  Neck: Supple  Respiratory: No wheezes or increased WOB  Cardiovascular: RRR no mrg  Abdomen: Soft  Extremities: WWP, no edema  Skin: Intact   Neurological: AOx3  Psychiatry: Normal affect    HOSPITAL MEDICATIONS:  MEDICATIONS  (STANDING):  atorvastatin 10 milliGRAM(s) Oral at bedtime  chlorhexidine 2% Cloths 1 Application(s) Topical daily  dextrose 5%. 1000 milliLiter(s) (100 mL/Hr) IV Continuous <Continuous>  dextrose 5%. 1000 milliLiter(s) (50 mL/Hr) IV Continuous <Continuous>  dextrose 50% Injectable 25 Gram(s) IV Push once  dextrose 50% Injectable 12.5 Gram(s) IV Push once  dextrose 50% Injectable 25 Gram(s) IV Push once  gabapentin 300 milliGRAM(s) Oral two times a day  glucagon  Injectable 1 milliGRAM(s) IntraMuscular once  insulin lispro (ADMELOG) corrective regimen sliding scale   SubCutaneous three times a day before meals    MEDICATIONS  (PRN):  acetaminophen     Tablet .. 650 milliGRAM(s) Oral every 6 hours PRN Temp greater or equal to 38C (100.4F), Mild Pain (1 - 3)  dextrose Oral Gel 15 Gram(s) Oral once PRN Blood Glucose LESS THAN 70 milliGRAM(s)/deciliter  melatonin 3 milliGRAM(s) Oral at bedtime PRN Insomnia      LABS:                        11.3   7.28  )-----------( 142      ( 20 Jul 2022 06:20 )             35.1     Hgb Trend: 11.3<--, 10.2<--, 10.8<--  07-20    137  |  99  |  9   ----------------------------<  126<H>  4.6   |  27  |  0.55    Ca    9.6      20 Jul 2022 06:20  Phos  3.7     07-20  Mg     2.10     07-20    TPro  8.3  /  Alb  3.9  /  TBili  0.7  /  DBili  x   /  AST  65<H>  /  ALT  30  /  AlkPhos  138<H>  07-20    Creatinine Trend: 0.55<--, 0.50<--, 0.52<--, 0.50<--, 0.61<--  PT/INR - ( 20 Jul 2022 06:20 )   PT: 16.4 sec;   INR: 1.41 ratio         PTT - ( 19 Jul 2022 04:11 )  PTT:32.4 sec      Venous Blood Gas:  07-18 @ 18:00  7.32/51/69/26/94.0  VBG Lactate: 1.7      MICROBIOLOGY:       RADIOLOGY:  [ x ] Reviewed and interpreted by me    CT scan 7/18 reviewed and demonstrates YUNI mass with associated consolidation and nodular opacities     PULMONARY FUNCTION TESTS:    EKG:
CHIEF COMPLAINT:Patient is a 57y old  Female who presents with a chief complaint of YUNI masslike lesion (21 Jul 2022 15:31)      Interval Events: no overnight events. continues to have productive cough, minimal hemopytisis. Overall feeling better with less chest pain and improved CERDA. denies fevers, chills, nausea, vomiting, diarrhea, leg swelling, headache    REVIEW OF SYSTEMS:  [x] All other systems negative except per HPI   [ ] Unable to assess ROS because ________    OBJECTIVE:  ICU Vital Signs Last 24 Hrs  T(C): 36.8 (21 Jul 2022 13:19), Max: 36.8 (21 Jul 2022 13:19)  T(F): 98.2 (21 Jul 2022 13:19), Max: 98.2 (21 Jul 2022 13:19)  HR: 68 (21 Jul 2022 13:19) (65 - 85)  BP: 105/60 (21 Jul 2022 13:19) (94/64 - 113/61)  BP(mean): 71 (20 Jul 2022 19:15) (53 - 71)  ABP: --  ABP(mean): --  RR: 16 (21 Jul 2022 05:31) (16 - 23)  SpO2: 99% (21 Jul 2022 13:19) (95% - 100%)    O2 Parameters below as of 21 Jul 2022 13:19  Patient On (Oxygen Delivery Method): room air              07-20 @ 07:01  -  07-21 @ 07:00  --------------------------------------------------------  IN: 0 mL / OUT: 350 mL / NET: -350 mL        PHYSICAL EXAM:  GENERAL: NAD, well-groomed, well-developed  HEAD:  Atraumatic, Normocephalic  EYES: EOMI, PERRLA, conjunctiva and sclera clear  ENMT: No tonsillar erythema, exudates, or enlargement; Moist mucous membranes, Good dentition, No lesions  NECK: Supple, No JVD, Normal thyroid  CHEST/LUNG: Clear to auscultation bilaterally; No rales, rhonchi, wheezing, or rubs  HEART: Regular rate and rhythm; No murmurs, rubs, or gallops  ABDOMEN: Soft, Nontender, Nondistended; Bowel sounds present  VASCULAR:  2+ Peripheral Pulses, No clubbing, cyanosis, or edema  LYMPH: No lymphadenopathy noted  SKIN: No rashes or lesions  NERVOUS SYSTEM:  Alert & Oriented X3, Good concentration; Motor Strength 5/5 B/L upper and lower extremities; DTRs 2+ intact and symmetric    HOSPITAL MEDICATIONS:  MEDICATIONS  (STANDING):  atorvastatin 10 milliGRAM(s) Oral at bedtime  chlorhexidine 2% Cloths 1 Application(s) Topical daily  dextrose 5%. 1000 milliLiter(s) (100 mL/Hr) IV Continuous <Continuous>  dextrose 5%. 1000 milliLiter(s) (50 mL/Hr) IV Continuous <Continuous>  dextrose 50% Injectable 25 Gram(s) IV Push once  dextrose 50% Injectable 12.5 Gram(s) IV Push once  dextrose 50% Injectable 25 Gram(s) IV Push once  gabapentin 300 milliGRAM(s) Oral two times a day  glucagon  Injectable 1 milliGRAM(s) IntraMuscular once  insulin lispro (ADMELOG) corrective regimen sliding scale   SubCutaneous three times a day before meals    MEDICATIONS  (PRN):  acetaminophen     Tablet .. 650 milliGRAM(s) Oral every 6 hours PRN Temp greater or equal to 38C (100.4F), Mild Pain (1 - 3)  dextrose Oral Gel 15 Gram(s) Oral once PRN Blood Glucose LESS THAN 70 milliGRAM(s)/deciliter  melatonin 3 milliGRAM(s) Oral at bedtime PRN Insomnia      LABS:    The Labs were reviewed by me   The Radiology was reviewed by me    EKG tracing reviewed by me    07-21    142  |  112<H>  |  21  ----------------------------<  94  4.1   |  23  |  0.53  07-20    137  |  99  |  9   ----------------------------<  126<H>  4.6   |  27  |  0.55  07-19    138  |  103  |  8   ----------------------------<  131<H>  3.6   |  25  |  0.50    Ca    9.0      21 Jul 2022 06:45  Ca    9.6      20 Jul 2022 06:20  Ca    8.9      19 Jul 2022 04:11  Phos  2.5     07-21  Mg     2.00     07-21    TPro  5.3<L>  /  Alb  3.0<L>  /  TBili  0.4  /  DBili  x   /  AST  27  /  ALT  13  /  AlkPhos  82  07-21  TPro  8.3  /  Alb  3.9  /  TBili  0.7  /  DBili  x   /  AST  65<H>  /  ALT  30  /  AlkPhos  138<H>  07-20  TPro  7.2  /  Alb  3.9  /  TBili  0.7  /  DBili  x   /  AST  34<H>  /  ALT  18  /  AlkPhos  115  07-19    Magnesium, Serum: 2.00 mg/dL (07-21-22 @ 06:45)  Magnesium, Serum: 2.10 mg/dL (07-20-22 @ 06:20)  Magnesium, Serum: 2.00 mg/dL (07-19-22 @ 04:11)    Phosphorus Level, Serum: 2.5 mg/dL (07-21-22 @ 06:45)  Phosphorus Level, Serum: 3.7 mg/dL (07-20-22 @ 06:20)  Phosphorus Level, Serum: 3.5 mg/dL (07-19-22 @ 04:11)      PT/INR - ( 20 Jul 2022 06:20 )   PT: 16.4 sec;   INR: 1.41 ratio                                                 11.6   10.00 )-----------( 130      ( 21 Jul 2022 11:58 )             35.7                         11.4   7.05  )-----------( 118      ( 21 Jul 2022 09:35 )             35.3                         6.8    3.10  )-----------( 105      ( 21 Jul 2022 06:45 )             23.0     CAPILLARY BLOOD GLUCOSE      POCT Blood Glucose.: 141 mg/dL (21 Jul 2022 17:15)  POCT Blood Glucose.: 227 mg/dL (21 Jul 2022 12:15)  POCT Blood Glucose.: 230 mg/dL (21 Jul 2022 08:23)  POCT Blood Glucose.: 209 mg/dL (20 Jul 2022 22:08)        MICROBIOLOGY:     RADIOLOGY:  [ ] Reviewed and interpreted by me    Point of Care Ultrasound Findings:    PFT:    EKG:
INTERVAL HPI/OVERNIGHT EVENTS:  Patient seen at bedside.  Patient s/p bronch yesterday  Translation provided by pt dtr via telephone per patient request  Endorsing feeling weak s/p procedure however no additional  hemoptysis and denies any signs of overt bleed      VITAL SIGNS:  T(F): 98.2 (07-21-22 @ 13:19)  HR: 68 (07-21-22 @ 13:19)  BP: 105/60 (07-21-22 @ 13:19)  RR: 16 (07-21-22 @ 05:31)  SpO2: 99% (07-21-22 @ 13:19)  Wt(kg): --    PHYSICAL EXAM:    CONSTITUTIONAL: NAD, well-developed stting OOB in chair  RESPIRATORY: Normal respiratory effort; lungs are clear to auscultation bilaterally  CARDIOVASCULAR: Regular rate and rhythm, normal S1 and S2, no murmur/rub/gallop;   ABDOMEN: Nontender to palpation, normoactive bowel sounds, no rebound/guarding;   MUSCLOSKELETAL: no clubbing or cyanosis of digits; no joint swelling or tenderness to palpation  PSYCH: A+O to person, place, and time; affect appropriate      MEDICATIONS  (STANDING):  atorvastatin 10 milliGRAM(s) Oral at bedtime  chlorhexidine 2% Cloths 1 Application(s) Topical daily  dextrose 5%. 1000 milliLiter(s) (100 mL/Hr) IV Continuous <Continuous>  dextrose 5%. 1000 milliLiter(s) (50 mL/Hr) IV Continuous <Continuous>  dextrose 50% Injectable 25 Gram(s) IV Push once  dextrose 50% Injectable 12.5 Gram(s) IV Push once  dextrose 50% Injectable 25 Gram(s) IV Push once  gabapentin 300 milliGRAM(s) Oral two times a day  glucagon  Injectable 1 milliGRAM(s) IntraMuscular once  insulin lispro (ADMELOG) corrective regimen sliding scale   SubCutaneous three times a day before meals    MEDICATIONS  (PRN):  acetaminophen     Tablet .. 650 milliGRAM(s) Oral every 6 hours PRN Temp greater or equal to 38C (100.4F), Mild Pain (1 - 3)  dextrose Oral Gel 15 Gram(s) Oral once PRN Blood Glucose LESS THAN 70 milliGRAM(s)/deciliter  melatonin 3 milliGRAM(s) Oral at bedtime PRN Insomnia      Allergies    No Known Allergies    Intolerances        LABS:                        11.6   10.00 )-----------( 130      ( 21 Jul 2022 11:58 )             35.7     07-21    142  |  112<H>  |  21  ----------------------------<  94  4.1   |  23  |  0.53    Ca    9.0      21 Jul 2022 06:45  Phos  2.5     07-21  Mg     2.00     07-21    TPro  5.3<L>  /  Alb  3.0<L>  /  TBili  0.4  /  DBili  x   /  AST  27  /  ALT  13  /  AlkPhos  82  07-21    PT/INR - ( 20 Jul 2022 06:20 )   PT: 16.4 sec;   INR: 1.41 ratio               RADIOLOGY & ADDITIONAL TESTS:  Studies reviewed.  
Sammy Ramos MD  Academic Hospitalist  Pager [phone]  Email: [email or web address]  Available on [employer] Teams        PROGRESS NOTE:     Patient is a 57y old  Female who presents with a chief complaint of YUNI masslike lesion (19 Jul 2022 15:50)      SUBJECTIVE / OVERNIGHT EVENTS:  Patient seen and examined this morning. Patient with continued episodes of hemoptysis and associated pain "in the back of the lungs".  ADDITIONAL REVIEW OF SYSTEMS:  No reports of f/c    MEDICATIONS  (STANDING):  atorvastatin 10 milliGRAM(s) Oral at bedtime  chlorhexidine 2% Cloths 1 Application(s) Topical daily  dextrose 5%. 1000 milliLiter(s) (100 mL/Hr) IV Continuous <Continuous>  dextrose 5%. 1000 milliLiter(s) (50 mL/Hr) IV Continuous <Continuous>  dextrose 50% Injectable 25 Gram(s) IV Push once  dextrose 50% Injectable 12.5 Gram(s) IV Push once  dextrose 50% Injectable 25 Gram(s) IV Push once  gabapentin 300 milliGRAM(s) Oral two times a day  glucagon  Injectable 1 milliGRAM(s) IntraMuscular once  insulin lispro (ADMELOG) corrective regimen sliding scale   SubCutaneous three times a day before meals    MEDICATIONS  (PRN):  acetaminophen     Tablet .. 650 milliGRAM(s) Oral every 6 hours PRN Temp greater or equal to 38C (100.4F), Mild Pain (1 - 3)  dextrose Oral Gel 15 Gram(s) Oral once PRN Blood Glucose LESS THAN 70 milliGRAM(s)/deciliter  melatonin 3 milliGRAM(s) Oral at bedtime PRN Insomnia      CAPILLARY BLOOD GLUCOSE      POCT Blood Glucose.: 129 mg/dL (20 Jul 2022 12:02)  POCT Blood Glucose.: 147 mg/dL (20 Jul 2022 08:23)  POCT Blood Glucose.: 141 mg/dL (19 Jul 2022 21:43)  POCT Blood Glucose.: 192 mg/dL (19 Jul 2022 17:43)    I&O's Summary      PHYSICAL EXAM:  Vital Signs Last 24 Hrs  T(C): 36.7 (20 Jul 2022 12:56), Max: 37.3 (19 Jul 2022 21:51)  T(F): 98.1 (20 Jul 2022 12:56), Max: 99.1 (19 Jul 2022 21:51)  HR: 66 (20 Jul 2022 12:56) (65 - 76)  BP: 104/68 (20 Jul 2022 12:56) (99/64 - 115/74)  BP(mean): --  RR: 16 (20 Jul 2022 12:56) (16 - 20)  SpO2: 99% (20 Jul 2022 12:56) (98% - 99%)    Parameters below as of 20 Jul 2022 12:56  Patient On (Oxygen Delivery Method): room air        CONSTITUTIONAL: NAD, well-developed  RESPIRATORY: Normal respiratory effort; lungs are clear to auscultation bilaterally  CARDIOVASCULAR: Regular rate and rhythm, normal S1 and S2, no murmur/rub/gallop;   ABDOMEN: Nontender to palpation, normoactive bowel sounds, no rebound/guarding;   MUSCLOSKELETAL: no clubbing or cyanosis of digits; no joint swelling or tenderness to palpation  PSYCH: A+O to person, place, and time; affect appropriate    LABS:                        11.3   7.28  )-----------( 142      ( 20 Jul 2022 06:20 )             35.1     07-20    137  |  99  |  9   ----------------------------<  126<H>  4.6   |  27  |  0.55    Ca    9.6      20 Jul 2022 06:20  Phos  3.7     07-20  Mg     2.10     07-20    TPro  8.3  /  Alb  3.9  /  TBili  0.7  /  DBili  x   /  AST  65<H>  /  ALT  30  /  AlkPhos  138<H>  07-20    PT/INR - ( 20 Jul 2022 06:20 )   PT: 16.4 sec;   INR: 1.41 ratio         PTT - ( 19 Jul 2022 04:11 )  PTT:32.4 sec            RADIOLOGY & ADDITIONAL TESTS:  Results Reviewed:   Imaging Personally Reviewed:  Electrocardiogram Personally Reviewed:    COORDINATION OF CARE:  Care Discussed with Consultants/Other Providers [Y/N]:  Prior or Outpatient Records Reviewed [Y/N]:  
Sammy Ramos MD  Academic Hospitalist  Pager [phone]  Email: [email or web address]  Available on [employer] Teams        PROGRESS NOTE:     Patient is a 57y old  Female who presents with a chief complaint of YUNI masslike lesion (21 Jul 2022 13:29)      SUBJECTIVE / OVERNIGHT EVENTS:  Patient seen and examined this morning. Patient w/o new complaints this morning. Blood work earlier was incorrect, repeat with labs with normal hb.  Patient requested in English that her daughter translates. Discussed with the daughter via patient's cell phone.  ADDITIONAL REVIEW OF SYSTEMS:  No f/c/n/v    MEDICATIONS  (STANDING):  atorvastatin 10 milliGRAM(s) Oral at bedtime  chlorhexidine 2% Cloths 1 Application(s) Topical daily  dextrose 5%. 1000 milliLiter(s) (100 mL/Hr) IV Continuous <Continuous>  dextrose 5%. 1000 milliLiter(s) (50 mL/Hr) IV Continuous <Continuous>  dextrose 50% Injectable 25 Gram(s) IV Push once  dextrose 50% Injectable 12.5 Gram(s) IV Push once  dextrose 50% Injectable 25 Gram(s) IV Push once  gabapentin 300 milliGRAM(s) Oral two times a day  glucagon  Injectable 1 milliGRAM(s) IntraMuscular once  insulin lispro (ADMELOG) corrective regimen sliding scale   SubCutaneous three times a day before meals    MEDICATIONS  (PRN):  acetaminophen     Tablet .. 650 milliGRAM(s) Oral every 6 hours PRN Temp greater or equal to 38C (100.4F), Mild Pain (1 - 3)  dextrose Oral Gel 15 Gram(s) Oral once PRN Blood Glucose LESS THAN 70 milliGRAM(s)/deciliter  melatonin 3 milliGRAM(s) Oral at bedtime PRN Insomnia      CAPILLARY BLOOD GLUCOSE      POCT Blood Glucose.: 227 mg/dL (21 Jul 2022 12:15)  POCT Blood Glucose.: 230 mg/dL (21 Jul 2022 08:23)  POCT Blood Glucose.: 209 mg/dL (20 Jul 2022 22:08)  POCT Blood Glucose.: 132 mg/dL (20 Jul 2022 17:24)    I&O's Summary    20 Jul 2022 07:01  -  21 Jul 2022 07:00  --------------------------------------------------------  IN: 0 mL / OUT: 350 mL / NET: -350 mL        PHYSICAL EXAM:  Vital Signs Last 24 Hrs  T(C): 36.8 (21 Jul 2022 13:19), Max: 36.8 (20 Jul 2022 16:52)  T(F): 98.2 (21 Jul 2022 13:19), Max: 98.2 (20 Jul 2022 16:52)  HR: 68 (21 Jul 2022 13:19) (65 - 93)  BP: 105/60 (21 Jul 2022 13:19) (82/57 - 117/52)  BP(mean): 71 (20 Jul 2022 19:15) (46 - 92)  RR: 16 (21 Jul 2022 05:31) (16 - 28)  SpO2: 99% (21 Jul 2022 13:19) (95% - 100%)    Parameters below as of 21 Jul 2022 13:19  Patient On (Oxygen Delivery Method): room air        CONSTITUTIONAL: NAD, well-developed  RESPIRATORY: Normal respiratory effort; lungs are clear to auscultation bilaterally  CARDIOVASCULAR: Regular rate and rhythm, normal S1 and S2, no murmur/rub/gallop;   ABDOMEN: Nontender to palpation, normoactive bowel sounds, no rebound/guarding;   MUSCLOSKELETAL: no clubbing or cyanosis of digits; no joint swelling or tenderness to palpation  PSYCH: A+O to person, place, and time; affect appropriate    LABS:                        11.6   10.00 )-----------( 130      ( 21 Jul 2022 11:58 )             35.7     07-21    142  |  112<H>  |  21  ----------------------------<  94  4.1   |  23  |  0.53    Ca    9.0      21 Jul 2022 06:45  Phos  2.5     07-21  Mg     2.00     07-21    TPro  5.3<L>  /  Alb  3.0<L>  /  TBili  0.4  /  DBili  x   /  AST  27  /  ALT  13  /  AlkPhos  82  07-21    PT/INR - ( 20 Jul 2022 06:20 )   PT: 16.4 sec;   INR: 1.41 ratio                   Culture - Acid Fast - Bronchial w/Smear (collected 20 Jul 2022 16:20)  Source: .Bronchial LEFT UPPER LOBE    Culture - Fungal, Bronchial (collected 20 Jul 2022 16:20)  Source: .Bronchial LEFT UPPER LOBE  Preliminary Report (21 Jul 2022 11:04):    Testing in progress    Culture - Bronchial (collected 20 Jul 2022 16:20)  Source: .Bronchial LEFT UPPER LOBE  Gram Stain (21 Jul 2022 07:10):    Rare polymorphonuclear leukocytes seen per low power field    Rare Squamous epithelial cells seen per low power field    No organisms seen per oil power field        RADIOLOGY & ADDITIONAL TESTS:  Results Reviewed:   Imaging Personally Reviewed:  Electrocardiogram Personally Reviewed:    COORDINATION OF CARE:  Care Discussed with Consultants/Other Providers [Y/N]: Patient care discussed with oncology during interdisciplinary rounds, case management, nursing management  and social work were also present. As per Dr. Cronin, we can likely follow up path results as outpatient. If no further drop in hb tomorrow, will discharge the patient.   Prior or Outpatient Records Reviewed [Y/N]:  
Sammy Ramos MD  Academic Hospitalist  Pager [phone]  Email: [email or web address]  Available on [employer] Teams        PROGRESS NOTE:     Patient is a 57y old  Female who presents with a chief complaint of YUNI masslike lesion (21 Jul 2022 17:57)      SUBJECTIVE / OVERNIGHT EVENTS:  Patient seen and examined this morning. Plan of care discussed with daughter via patient's cell phone. Patient optimized for discharge, will follow up outpatient for pathology results. Patient's daughter verbalized understanding that if the coughing gets worse and there is a significant amount of bloody sputum, the patient should come back to the hospital. The patient requested in English that her daughter translate.   ADDITIONAL REVIEW OF SYSTEMS:  No f/c/n/v    MEDICATIONS  (STANDING):  atorvastatin 10 milliGRAM(s) Oral at bedtime  chlorhexidine 2% Cloths 1 Application(s) Topical daily  dextrose 5%. 1000 milliLiter(s) (100 mL/Hr) IV Continuous <Continuous>  dextrose 5%. 1000 milliLiter(s) (50 mL/Hr) IV Continuous <Continuous>  dextrose 50% Injectable 25 Gram(s) IV Push once  dextrose 50% Injectable 12.5 Gram(s) IV Push once  dextrose 50% Injectable 25 Gram(s) IV Push once  gabapentin 300 milliGRAM(s) Oral two times a day  glucagon  Injectable 1 milliGRAM(s) IntraMuscular once  insulin lispro (ADMELOG) corrective regimen sliding scale   SubCutaneous three times a day before meals    MEDICATIONS  (PRN):  acetaminophen     Tablet .. 650 milliGRAM(s) Oral every 6 hours PRN Temp greater or equal to 38C (100.4F), Mild Pain (1 - 3)  dextrose Oral Gel 15 Gram(s) Oral once PRN Blood Glucose LESS THAN 70 milliGRAM(s)/deciliter  melatonin 3 milliGRAM(s) Oral at bedtime PRN Insomnia      CAPILLARY BLOOD GLUCOSE      POCT Blood Glucose.: 190 mg/dL (22 Jul 2022 12:47)  POCT Blood Glucose.: 129 mg/dL (22 Jul 2022 08:26)  POCT Blood Glucose.: 159 mg/dL (21 Jul 2022 22:31)  POCT Blood Glucose.: 141 mg/dL (21 Jul 2022 17:15)    I&O's Summary    21 Jul 2022 07:01  -  22 Jul 2022 07:00  --------------------------------------------------------  IN: 935 mL / OUT: 0 mL / NET: 935 mL        PHYSICAL EXAM:  Vital Signs Last 24 Hrs  T(C): 36.6 (22 Jul 2022 14:15), Max: 36.6 (22 Jul 2022 14:15)  T(F): 97.8 (22 Jul 2022 14:15), Max: 97.8 (22 Jul 2022 14:15)  HR: 64 (22 Jul 2022 14:15) (62 - 64)  BP: 108/55 (22 Jul 2022 14:15) (108/55 - 113/65)  BP(mean): --  RR: 16 (22 Jul 2022 14:15) (16 - 17)  SpO2: 99% (22 Jul 2022 14:15) (97% - 99%)    Parameters below as of 22 Jul 2022 14:15  Patient On (Oxygen Delivery Method): room air        CONSTITUTIONAL: NAD, well-developed  RESPIRATORY: Normal respiratory effort; lungs are clear to auscultation bilaterally  CARDIOVASCULAR: Regular rate and rhythm, normal S1 and S2, no murmur/rub/gallop;   ABDOMEN: Nontender to palpation, normoactive bowel sounds, no rebound/guarding;   MUSCLOSKELETAL: no clubbing or cyanosis of digits; no joint swelling or tenderness to palpation  PSYCH: A+O to person, place, and time; affect appropriate  LABS:                        11.5   9.61  )-----------( 147      ( 22 Jul 2022 06:24 )             34.4     07-22    135  |  97<L>  |  10  ----------------------------<  130<H>  4.4   |  25  |  0.52    Ca    9.4      22 Jul 2022 06:24  Phos  3.1     07-22  Mg     1.90     07-22    TPro  5.3<L>  /  Alb  3.0<L>  /  TBili  0.4  /  DBili  x   /  AST  27  /  ALT  13  /  AlkPhos  82  07-21              Culture - Acid Fast - Bronchial w/Smear (collected 20 Jul 2022 16:20)  Source: .Bronchial LEFT UPPER LOBE    Culture - Fungal, Bronchial (collected 20 Jul 2022 16:20)  Source: .Bronchial LEFT UPPER LOBE  Preliminary Report (21 Jul 2022 11:04):    Testing in progress    Culture - Bronchial (collected 20 Jul 2022 16:20)  Source: .Bronchial LEFT UPPER LOBE  Gram Stain (21 Jul 2022 07:10):    Rare polymorphonuclear leukocytes seen per low power field    Rare Squamous epithelial cells seen per low power field    No organisms seen per oil power field  Preliminary Report (22 Jul 2022 11:53):    Normal Respiratory Danielle present        RADIOLOGY & ADDITIONAL TESTS:  Results Reviewed:   Imaging Personally Reviewed:  Electrocardiogram Personally Reviewed:    COORDINATION OF CARE:  Care Discussed with Consultants/Other Providers [Y/N]: Patient care discussed with oncology during interdisciplinary rounds, case management, nursing management  and social work were also present. As per Dr. Cronin, we can likely follow up path results as outpatient. Hb stable, patient can discharged.   Prior or Outpatient Records Reviewed [Y/N]:

## 2022-07-23 LAB
CULTURE RESULTS: SIGNIFICANT CHANGE UP
SPECIMEN SOURCE: SIGNIFICANT CHANGE UP

## 2022-07-29 LAB — NON-GYNECOLOGICAL CYTOLOGY STUDY: SIGNIFICANT CHANGE UP

## 2022-08-03 ENCOUNTER — APPOINTMENT (OUTPATIENT)
Dept: HEMATOLOGY ONCOLOGY | Facility: CLINIC | Age: 57
End: 2022-08-03

## 2022-08-03 VITALS
OXYGEN SATURATION: 97 % | WEIGHT: 103.62 LBS | RESPIRATION RATE: 16 BRPM | TEMPERATURE: 97.1 F | BODY MASS INDEX: 20.24 KG/M2 | HEART RATE: 73 BPM | DIASTOLIC BLOOD PRESSURE: 61 MMHG | SYSTOLIC BLOOD PRESSURE: 100 MMHG

## 2022-08-03 PROCEDURE — 99214 OFFICE O/P EST MOD 30 MIN: CPT

## 2022-08-03 RX ORDER — GLIMEPIRIDE 1 MG/1
1 TABLET ORAL DAILY
Qty: 90 | Refills: 0 | Status: DISCONTINUED | COMMUNITY
Start: 2021-11-15 | End: 2022-08-03

## 2022-08-03 RX ORDER — POLYETHYLENE GLYCOL 3350, SODIUM SULFATE, SODIUM CHLORIDE, POTASSIUM CHLORIDE, SODIUM ASCORBATE, AND ASCORBIC ACID 7.5-2.691G
100 KIT ORAL
Qty: 1 | Refills: 0 | Status: DISCONTINUED | COMMUNITY
Start: 2022-01-13 | End: 2022-08-03

## 2022-08-03 RX ORDER — OXYCODONE AND ACETAMINOPHEN 5; 325 MG/1; MG/1
5-325 TABLET ORAL
Qty: 25 | Refills: 0 | Status: DISCONTINUED | COMMUNITY
Start: 2022-03-03 | End: 2022-08-03

## 2022-08-03 RX ORDER — POTASSIUM CHLORIDE 1500 MG/1
20 TABLET, FILM COATED, EXTENDED RELEASE ORAL
Qty: 4 | Refills: 0 | Status: DISCONTINUED | COMMUNITY
Start: 2022-01-13 | End: 2022-08-03

## 2022-08-03 RX ORDER — OXYCODONE 5 MG/1
5 TABLET ORAL
Qty: 10 | Refills: 0 | Status: DISCONTINUED | COMMUNITY
Start: 2021-06-02 | End: 2022-08-03

## 2022-08-08 ENCOUNTER — RESULT REVIEW (OUTPATIENT)
Age: 57
End: 2022-08-08

## 2022-08-08 ENCOUNTER — NON-APPOINTMENT (OUTPATIENT)
Age: 57
End: 2022-08-08

## 2022-08-08 ENCOUNTER — APPOINTMENT (OUTPATIENT)
Dept: INFUSION THERAPY | Facility: HOSPITAL | Age: 57
End: 2022-08-08

## 2022-08-08 LAB
BASOPHILS # BLD AUTO: 0.03 K/UL — SIGNIFICANT CHANGE UP (ref 0–0.2)
BASOPHILS NFR BLD AUTO: 0.6 % — SIGNIFICANT CHANGE UP (ref 0–2)
EOSINOPHIL # BLD AUTO: 0.07 K/UL — SIGNIFICANT CHANGE UP (ref 0–0.5)
EOSINOPHIL NFR BLD AUTO: 1.3 % — SIGNIFICANT CHANGE UP (ref 0–6)
HCT VFR BLD CALC: 31.3 % — LOW (ref 34.5–45)
HGB BLD-MCNC: 10.6 G/DL — LOW (ref 11.5–15.5)
IMM GRANULOCYTES NFR BLD AUTO: 0.4 % — SIGNIFICANT CHANGE UP (ref 0–1.5)
LYMPHOCYTES # BLD AUTO: 1.91 K/UL — SIGNIFICANT CHANGE UP (ref 1–3.3)
LYMPHOCYTES # BLD AUTO: 35.3 % — SIGNIFICANT CHANGE UP (ref 13–44)
MCHC RBC-ENTMCNC: 25.7 PG — LOW (ref 27–34)
MCHC RBC-ENTMCNC: 33.9 G/DL — SIGNIFICANT CHANGE UP (ref 32–36)
MCV RBC AUTO: 75.8 FL — LOW (ref 80–100)
MONOCYTES # BLD AUTO: 0.58 K/UL — SIGNIFICANT CHANGE UP (ref 0–0.9)
MONOCYTES NFR BLD AUTO: 10.7 % — SIGNIFICANT CHANGE UP (ref 2–14)
NEUTROPHILS # BLD AUTO: 2.8 K/UL — SIGNIFICANT CHANGE UP (ref 1.8–7.4)
NEUTROPHILS NFR BLD AUTO: 51.7 % — SIGNIFICANT CHANGE UP (ref 43–77)
NRBC # BLD: 0 /100 WBCS — SIGNIFICANT CHANGE UP (ref 0–0)
PLATELET # BLD AUTO: 118 K/UL — LOW (ref 150–400)
RBC # BLD: 4.13 M/UL — SIGNIFICANT CHANGE UP (ref 3.8–5.2)
RBC # FLD: 14.6 % — HIGH (ref 10.3–14.5)
WBC # BLD: 5.41 K/UL — SIGNIFICANT CHANGE UP (ref 3.8–10.5)
WBC # FLD AUTO: 5.41 K/UL — SIGNIFICANT CHANGE UP (ref 3.8–10.5)

## 2022-08-08 RX ORDER — GABAPENTIN 400 MG/1
1 CAPSULE ORAL
Qty: 0 | Refills: 0 | DISCHARGE

## 2022-08-09 DIAGNOSIS — R11.2 NAUSEA WITH VOMITING, UNSPECIFIED: ICD-10-CM

## 2022-08-09 DIAGNOSIS — Z51.11 ENCOUNTER FOR ANTINEOPLASTIC CHEMOTHERAPY: ICD-10-CM

## 2022-08-09 LAB
ALBUMIN SERPL ELPH-MCNC: 4.1 G/DL — SIGNIFICANT CHANGE UP (ref 3.3–5)
ALP SERPL-CCNC: 139 U/L — HIGH (ref 40–120)
ALT FLD-CCNC: 30 U/L — SIGNIFICANT CHANGE UP (ref 10–45)
ANION GAP SERPL CALC-SCNC: 13 MMOL/L — SIGNIFICANT CHANGE UP (ref 5–17)
AST SERPL-CCNC: 43 U/L — HIGH (ref 10–40)
BILIRUB SERPL-MCNC: 0.6 MG/DL — SIGNIFICANT CHANGE UP (ref 0.2–1.2)
BUN SERPL-MCNC: 12 MG/DL — SIGNIFICANT CHANGE UP (ref 7–23)
CALCIUM SERPL-MCNC: 9.4 MG/DL — SIGNIFICANT CHANGE UP (ref 8.4–10.5)
CANCER AG19-9 SERPL-ACNC: 1193 U/ML — HIGH
CHLORIDE SERPL-SCNC: 102 MMOL/L — SIGNIFICANT CHANGE UP (ref 96–108)
CO2 SERPL-SCNC: 24 MMOL/L — SIGNIFICANT CHANGE UP (ref 22–31)
CREAT SERPL-MCNC: 0.49 MG/DL — LOW (ref 0.5–1.3)
EGFR: 111 ML/MIN/1.73M2 — SIGNIFICANT CHANGE UP
GLUCOSE SERPL-MCNC: 103 MG/DL — HIGH (ref 70–99)
POTASSIUM SERPL-MCNC: 4.2 MMOL/L — SIGNIFICANT CHANGE UP (ref 3.5–5.3)
POTASSIUM SERPL-SCNC: 4.2 MMOL/L — SIGNIFICANT CHANGE UP (ref 3.5–5.3)
PROT SERPL-MCNC: 7.2 G/DL — SIGNIFICANT CHANGE UP (ref 6–8.3)
SODIUM SERPL-SCNC: 139 MMOL/L — SIGNIFICANT CHANGE UP (ref 135–145)

## 2022-08-15 ENCOUNTER — APPOINTMENT (OUTPATIENT)
Dept: PULMONOLOGY | Facility: CLINIC | Age: 57
End: 2022-08-15

## 2022-08-17 ENCOUNTER — APPOINTMENT (OUTPATIENT)
Dept: HEMATOLOGY ONCOLOGY | Facility: CLINIC | Age: 57
End: 2022-08-17

## 2022-08-17 PROCEDURE — 99215 OFFICE O/P EST HI 40 MIN: CPT | Mod: 95

## 2022-08-17 RX ORDER — SIMETHICONE 125 MG
125 CAPSULE ORAL
Qty: 30 | Refills: 0 | Status: COMPLETED | COMMUNITY
Start: 2021-08-04 | End: 2022-08-17

## 2022-08-17 RX ORDER — NYSTATIN 100000 [USP'U]/ML
100000 SUSPENSION ORAL 3 TIMES DAILY
Qty: 1 | Refills: 0 | Status: ACTIVE | COMMUNITY
Start: 2022-07-12 | End: 1900-01-01

## 2022-08-18 NOTE — HISTORY OF PRESENT ILLNESS
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 \par \par Initially was admitted to [facility] on 12/18/18 with dizziness, found to have elevated LFTs. MRCP demonstrated a dilatation of the CBD. ERCP on 12/22/18 performed c/w ampullary mass with obstruction. Pathology c/w adenocarcinoma. Pt was discharged for out pt follow up with surg onc and med onc but was re-admitted on 1/5/19 with n/v, worsening abdominal pain, found to have severe sepsis with E coli bacteremia 2/2 acute cholangitis. \par \par 1/5/19: CT A/P Heterogeneity of the right hepatic lobe with questionable 1.3 cm hypoattenuating lesion in segment 7.\par 1/7/19: ERCP performed. Stent was removed from the biliary tree. One plastic stent placed\par 1/8/19: MRI Abd: No suspicious liver lesions. No abnormality is identified to correspond with questionable lesion identified on prior CT. 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  Taken to the OR for evacuation of hematoma, cessation of gastroduodenal artery bleeding with two hemostatic stitches, and placement of abthera VAC.  Required pressors in the SICU, then developed fever. \par 1/21-1/22/19: OR exploratory laparotomy, drainage of abdominal abscess, repair of abdominal wall hernia with Strattice mesh, b/ abdominal wall advancement flaps. Post-op patient remained intubated and off vasopressors. FENA calculated to be pre-renal. 500 CC albumin bolus given, urine output improved. 1/22/19 attempted spontaneous breathing trials but pt hypoxic. Pt lactate uptrend, resuscitated w/IVF, and pt hypotensive requiring pressors. UCx growing candida, Diflucan changed to micafungin to r/o resistant organisms in the setting of worsening sepsis. \par 1/23/19: patient noted to have blood oozing from midline incision while hypotensive and on pressors. Peak pressures noted to be elevated on vent and abdomen increasingly distended. All staples removed and a large amount of clot evacuated from midline incision. 1U PRBC given. No active bleeding appreciated. Fascia remains closed. Wound packed with wet to dry dressing. Vitamin K given for elevated INR. \par 1/24-1/29/19: remained in SICU, started on TPN, diuresed, finally extubated. \par 2/1/19: septic shock again, pressors restarted\par 2/2-2/11/19: CT scan with peritonitis, new abscesses. Ab and Antifungals started. Wound with increase in drainage. Octreotide started for increased output \par 2/12/19: drainage of abscesses by IR.  \par 2/18/19: Wound was partially closed by plastics with interrupted sutures and an ostomy appliance was placed, VAC removed.\par 3/1/19. d/c home \par 4/30-10/21/19: s/p 8 cycles Gemzar/Xeloda chemotherapy\par 10/10/19: CT CAP: no evidence of mets\par 1/20-11/2020: prolonged stay in [facility] due to COVID \par 11/6/20: CT CAP: 2 mildly enlarged mesenteric LN, attention in f/u\par 5/19/21: CT CAP revealed stable exam, unchanged cardiophrenic and mesenteric root lymph nodes\par 12/7/21: CT CAP: stable disease\par 1/21/22: ventral hernia repair \par 3/10/22: CT abd/pel: significant interval improvement of previously described rectus/anterior abdominal wall hematoma, previously described focal hematoma at the left anterior abdominal wall is decreased in size\par 7/1/22: PET/CT: Masslike consolidation in the anterior left upper lung with intense uptake and left paratracheal lymph node, small focus of uptake in the right hepatic lobe near the severino hepatis suspicious for metastatic lesion.\par 7/19-7/22/22: J admission s/p bronchoscopy 7/20/22; 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) \par 8/8/22: C1 Gemzar/Abraxane [de-identified] : adenocarcinoma [de-identified] : CA [id number] [de-identified] : FINAL PATHOLOGY:\par -Invasive adenocarcinoma of the duodenal ampulla (Tumor size: 2.5 x 1.5 x 0.8 cm); Tumor stage: pT3b pN1\par -Vascular and perineural invasion identified\par -1/24 lymph nodes involved by adenocarcinoma\par -Negative margins\par -S/p cholecystectomy ; acute cholecystitis with cholelithiasis \par -Bile duct margin with acute inflammation and focal atypia consistent with low grade dysplasia. \par  [FreeTextEntry1] : s/p 1 cycle Gemzar/Abraxane [de-identified] : Patient continued to report persistent cough (worse in the evening after 5 pm), uses cough syrup 10 ml q 4-6 hrs but it provides mild/temporary relief. The worst of her pain is in her chest and back region, no relief with Tylenol. Her fatigue/generalized weakness and decreased appetite remains unchanged, continues to report weight loss; however her bowel movements remain regular according to daughter (normal in color, consistency, frequency). Patient also noted numbness/tingling in her b/l hands and feet (which she had prior to starting 8/5 tx but its more noticeable now, currently on gabapentin 300 mg twice daily for abdominal pain). She reported some bruises along her neck (denied recent trauma/injury, bleeding episodes, swollen lymph nodes, etc).

## 2022-08-18 NOTE — ASSESSMENT
[Palliative] : Goals of care discussed with patient: Palliative [Palliative Care Plan] : not applicable at this time [FreeTextEntry1] : rad med consult\par oxy med\par palliative care referral

## 2022-08-18 NOTE — REASON FOR VISIT
[Follow-Up Visit] : a follow-up [Other: _____] : [unfilled] [Home] : at home, [unfilled] , at the time of the visit. 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

## 2022-08-18 NOTE — PHYSICAL EXAM
[Restricted in physically strenuous activity but ambulatory and able to carry out work of a light or sedentary nature] : Status 1- Restricted in physically strenuous activity but ambulatory and able to carry out work of a light or sedentary nature, e.g., light house work, office work [Normal] : affect appropriate [de-identified] : ambulating with cane [de-identified] : mild ecchymosis dispersed along anterior neck

## 2022-08-19 ENCOUNTER — OUTPATIENT (OUTPATIENT)
Dept: OUTPATIENT SERVICES | Facility: HOSPITAL | Age: 57
LOS: 1 days | Discharge: HOME | End: 2022-08-19
Payer: MEDICAID

## 2022-08-19 DIAGNOSIS — K92.2 GASTROINTESTINAL HEMORRHAGE, UNSPECIFIED: Chronic | ICD-10-CM

## 2022-08-19 DIAGNOSIS — Z51.11 ENCOUNTER FOR ANTINEOPLASTIC CHEMOTHERAPY: Chronic | ICD-10-CM

## 2022-08-19 DIAGNOSIS — Z90.410 ACQUIRED TOTAL ABSENCE OF PANCREAS: Chronic | ICD-10-CM

## 2022-08-19 DIAGNOSIS — Z98.890 OTHER SPECIFIED POSTPROCEDURAL STATES: Chronic | ICD-10-CM

## 2022-08-20 LAB
CULTURE RESULTS: SIGNIFICANT CHANGE UP
SPECIMEN SOURCE: SIGNIFICANT CHANGE UP

## 2022-08-22 ENCOUNTER — RESULT REVIEW (OUTPATIENT)
Age: 57
End: 2022-08-22

## 2022-08-22 ENCOUNTER — APPOINTMENT (OUTPATIENT)
Dept: INFUSION THERAPY | Facility: HOSPITAL | Age: 57
End: 2022-08-22

## 2022-08-22 ENCOUNTER — APPOINTMENT (OUTPATIENT)
Dept: HEMATOLOGY ONCOLOGY | Facility: CLINIC | Age: 57
End: 2022-08-22

## 2022-08-22 LAB
ALBUMIN SERPL ELPH-MCNC: 4 G/DL — SIGNIFICANT CHANGE UP (ref 3.3–5)
ALP SERPL-CCNC: 139 U/L — HIGH (ref 40–120)
ALT FLD-CCNC: 52 U/L — HIGH (ref 10–45)
ANION GAP SERPL CALC-SCNC: 12 MMOL/L — SIGNIFICANT CHANGE UP (ref 5–17)
AST SERPL-CCNC: 75 U/L — HIGH (ref 10–40)
BASOPHILS # BLD AUTO: 0 K/UL — SIGNIFICANT CHANGE UP (ref 0–0.2)
BASOPHILS NFR BLD AUTO: 0 % — SIGNIFICANT CHANGE UP (ref 0–2)
BILIRUB SERPL-MCNC: 0.5 MG/DL — SIGNIFICANT CHANGE UP (ref 0.2–1.2)
BUN SERPL-MCNC: 10 MG/DL — SIGNIFICANT CHANGE UP (ref 7–23)
CALCIUM SERPL-MCNC: 9.3 MG/DL — SIGNIFICANT CHANGE UP (ref 8.4–10.5)
CANCER AG19-9 SERPL-ACNC: 829 U/ML — HIGH
CHLORIDE SERPL-SCNC: 103 MMOL/L — SIGNIFICANT CHANGE UP (ref 96–108)
CO2 SERPL-SCNC: 24 MMOL/L — SIGNIFICANT CHANGE UP (ref 22–31)
CREAT SERPL-MCNC: 0.46 MG/DL — LOW (ref 0.5–1.3)
EGFR: 112 ML/MIN/1.73M2 — SIGNIFICANT CHANGE UP
EOSINOPHIL # BLD AUTO: 0.22 K/UL — SIGNIFICANT CHANGE UP (ref 0–0.5)
EOSINOPHIL NFR BLD AUTO: 6 % — SIGNIFICANT CHANGE UP (ref 0–6)
GLUCOSE SERPL-MCNC: 131 MG/DL — HIGH (ref 70–99)
HCT VFR BLD CALC: 31.6 % — LOW (ref 34.5–45)
HGB BLD-MCNC: 10.5 G/DL — LOW (ref 11.5–15.5)
LYMPHOCYTES # BLD AUTO: 1.67 K/UL — SIGNIFICANT CHANGE UP (ref 1–3.3)
LYMPHOCYTES # BLD AUTO: 46 % — HIGH (ref 13–44)
MCHC RBC-ENTMCNC: 25.4 PG — LOW (ref 27–34)
MCHC RBC-ENTMCNC: 33.2 G/DL — SIGNIFICANT CHANGE UP (ref 32–36)
MCV RBC AUTO: 76.3 FL — LOW (ref 80–100)
MONOCYTES # BLD AUTO: 0.51 K/UL — SIGNIFICANT CHANGE UP (ref 0–0.9)
MONOCYTES NFR BLD AUTO: 14 % — SIGNIFICANT CHANGE UP (ref 2–14)
NEUTROPHILS # BLD AUTO: 1.23 K/UL — LOW (ref 1.8–7.4)
NEUTROPHILS NFR BLD AUTO: 34 % — LOW (ref 43–77)
NRBC # BLD: 0 /100 — SIGNIFICANT CHANGE UP (ref 0–0)
NRBC # BLD: SIGNIFICANT CHANGE UP /100 WBCS (ref 0–0)
PLAT MORPH BLD: NORMAL — SIGNIFICANT CHANGE UP
PLATELET # BLD AUTO: 182 K/UL — SIGNIFICANT CHANGE UP (ref 150–400)
POTASSIUM SERPL-MCNC: 4.4 MMOL/L — SIGNIFICANT CHANGE UP (ref 3.5–5.3)
POTASSIUM SERPL-SCNC: 4.4 MMOL/L — SIGNIFICANT CHANGE UP (ref 3.5–5.3)
PROT SERPL-MCNC: 7.2 G/DL — SIGNIFICANT CHANGE UP (ref 6–8.3)
RBC # BLD: 4.14 M/UL — SIGNIFICANT CHANGE UP (ref 3.8–5.2)
RBC # FLD: 15.1 % — HIGH (ref 10.3–14.5)
RBC BLD AUTO: SIGNIFICANT CHANGE UP
SODIUM SERPL-SCNC: 139 MMOL/L — SIGNIFICANT CHANGE UP (ref 135–145)
WBC # BLD: 3.62 K/UL — LOW (ref 3.8–10.5)
WBC # FLD AUTO: 3.62 K/UL — LOW (ref 3.8–10.5)

## 2022-08-22 PROCEDURE — 99215 OFFICE O/P EST HI 40 MIN: CPT

## 2022-08-23 ENCOUNTER — APPOINTMENT (OUTPATIENT)
Dept: RADIATION ONCOLOGY | Facility: CLINIC | Age: 57
End: 2022-08-23

## 2022-08-23 PROCEDURE — 99024 POSTOP FOLLOW-UP VISIT: CPT

## 2022-08-23 NOTE — PHYSICAL EXAM
[Restricted in physically strenuous activity but ambulatory and able to carry out work of a light or sedentary nature] : Status 1- Restricted in physically strenuous activity but ambulatory and able to carry out work of a light or sedentary nature, e.g., light house work, office work [Normal] : full range of motion and no deformities appreciated [de-identified] : ambulating with cane

## 2022-08-23 NOTE — REASON FOR VISIT
[Follow-Up Visit] : a follow-up [Other: _____] : [unfilled] [FreeTextEntry2] : Stage IV ampullary carcinoma

## 2022-08-23 NOTE — OB/GYN HISTORY
[Currently In Menopause] : currently in menopause [Menopause Age: ____] : patient was [unfilled] years old at menopause Recommendation Preamble: The following recommendations were made during the visit: Recommendations (Free Text): Profound $3500\\nCore: $1500 without eyes Detail Level: Detailed

## 2022-08-23 NOTE — HISTORY OF PRESENT ILLNESS
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 \par \par Initially was admitted to [facility] on 12/18/18 with dizziness, found to have elevated LFTs. MRCP demonstrated a dilatation of the CBD. ERCP on 12/22/18 performed c/w ampullary mass with obstruction. Pathology c/w adenocarcinoma. Pt was discharged for out pt follow up with surg onc and med onc but was re-admitted on 1/5/19 with n/v, worsening abdominal pain, found to have severe sepsis with E coli bacteremia 2/2 acute cholangitis. \par \par 1/5/19: CT A/P Heterogeneity of the right hepatic lobe with questionable 1.3 cm hypoattenuating lesion in segment 7.\par 1/7/19: ERCP performed. Stent was removed from the biliary tree. One plastic stent placed\par 1/8/19: MRI Abd: No suspicious liver lesions. No abnormality is identified to correspond with questionable lesion identified on prior CT. 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  Taken to the OR for evacuation of hematoma, cessation of gastroduodenal artery bleeding with two hemostatic stitches, and placement of abthera VAC.  Required pressors in the SICU, then developed fever. \par 1/21-1/22/19: OR exploratory laparotomy, drainage of abdominal abscess, repair of abdominal wall hernia with Strattice mesh, b/ abdominal wall advancement flaps. Post-op patient remained intubated and off vasopressors. FENA calculated to be pre-renal. 500 CC albumin bolus given, urine output improved. 1/22/19 attempted spontaneous breathing trials but pt hypoxic. Pt lactate uptrend, resuscitated w/IVF, and pt hypotensive requiring pressors. UCx growing candida, Diflucan changed to micafungin to r/o resistant organisms in the setting of worsening sepsis. \par 1/23/19: patient noted to have blood oozing from midline incision while hypotensive and on pressors. Peak pressures noted to be elevated on vent and abdomen increasingly distended. All staples removed and a large amount of clot evacuated from midline incision. 1U PRBC given. No active bleeding appreciated. Fascia remains closed. Wound packed with wet to dry dressing. Vitamin K given for elevated INR. \par 1/24-1/29/19: remained in SICU, started on TPN, diuresed, finally extubated. \par 2/1/19: septic shock again, pressors restarted\par 2/2-2/11/19: CT scan with peritonitis, new abscesses. Ab and Antifungals started. Wound with increase in drainage. Octreotide started for increased output \par 2/12/19: drainage of abscesses by IR.  \par 2/18/19: Wound was partially closed by plastics with interrupted sutures and an ostomy appliance was placed, VAC removed.\par 3/1/19. d/c home \par 4/30-10/21/19: s/p 8 cycles Gemzar/Xeloda chemotherapy\par 10/10/19: CT CAP: no evidence of mets\par 1/20-11/2020: prolonged stay in [facility] due to COVID \par 11/6/20: CT CAP: 2 mildly enlarged mesenteric LN, attention in f/u\par 5/19/21: CT CAP revealed stable exam, unchanged cardiophrenic and mesenteric root lymph nodes\par 12/7/21: CT CAP: stable disease\par 1/21/22: ventral hernia repair \par 3/10/22: CT abd/pel: significant interval improvement of previously described rectus/anterior abdominal wall hematoma, previously described focal hematoma at the left anterior abdominal wall is decreased in size\par 7/1/22: PET/CT: Masslike consolidation in the anterior left upper lung with intense uptake and left paratracheal lymph node, small focus of uptake in the right hepatic lobe near the severino hepatis suspicious for metastatic lesion.\par 7/19-7/22/22: [facility] admission s/p bronchoscopy 7/20/22; 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 \par  [de-identified] : Patient was seen at the infusion room while receiving treatment. She continued to report persistent cough (uses Robitussin and Promethazine/Codeine cough syrup 10 ml q 4-6 hrs, it provides mild/temporary relief). Her fatigue, peripheral neuropathy of b/l hands + feet, chest and back pain remain unchanged (was not able to  Oxycodone due to prior auth request). Patient's daughter is making a conscious effort to reinforce her oral intake (stable weight noted  today). Her bowel movements remain regular (normal in color, consistency, frequency).

## 2022-08-30 PROCEDURE — 77263 THER RADIOLOGY TX PLNG CPLX: CPT

## 2022-08-30 NOTE — REVIEW OF SYSTEMS
[Fatigue] : fatigue [Recent Change In Weight] : ~T recent weight change [SOB on Exertion] : shortness of breath during exertion [Joint Pain] : joint pain [Muscle Pain] : muscle pain [Negative] : Gastrointestinal [Dysphagia: Grade 0] : Dysphagia: Grade 0 [Esophagitis: Grade 0] : Esophagitis: Grade 0 [Nausea: Grade 1 - Loss of appetite without alteration in eating habits] : Nausea: Grade 1 - Loss of appetite without alteration in eating habits [Vomiting: Grade 0] : Vomiting: Grade 0 [Cough: Grade 0] : Cough: Grade 0 [Dyspnea: Grade 1 - Shortness of breath with moderate exertion] : Dyspnea: Grade 1 - Shortness of breath with moderate exertion [Pneumonitis: Grade 0] : Pneumonitis: Grade 0 [Fever] : no fever [Chills] : no chills

## 2022-08-30 NOTE — VITALS
[Maximal Pain Intensity: 9/10] : 9/10 [Least Pain Intensity: 4/10] : 4/10 [Opioid] : opioid [90: Able to carry normal activity; minor signs or symptoms of disease.] : 90: Able to carry normal activity; minor signs or symptoms of disease.  [ECOG Performance Status: 1 - Restricted in physically strenuous activity but ambulatory and able to carry out work of a light or sedentary nature] : Performance Status: 1 - Restricted in physically strenuous activity but ambulatory and able to carry out work of a light or sedentary nature, e.g., light house work, office work [6 - Distress Level] : Distress Level: 6 [FreeTextEntry7] : d

## 2022-09-01 NOTE — REASON FOR VISIT
[Follow-Up Visit] : a follow-up [Family Member] : family member [FreeTextEntry2] : Stage IV ampullary ca

## 2022-09-01 NOTE — REVIEW OF SYSTEMS
[Cough] : cough [Joint Pain] : joint pain [Joint Stiffness] : joint stiffness [Negative] : Allergic/Immunologic

## 2022-09-01 NOTE — HISTORY OF PRESENT ILLNESS
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 \par \par Initially was admitted to [facility] on 12/18/18 with dizziness, found to have elevated LFTs. MRCP demonstrated a dilatation of the CBD. ERCP on 12/22/18 performed c/w ampullary mass with obstruction. Pathology c/w adenocarcinoma. Pt was discharged for out pt follow up with surg onc and med onc but was re-admitted on 1/5/19 with n/v, worsening abdominal pain, found to have severe sepsis with E coli bacteremia 2/2 acute cholangitis. \par 1/7: ERCP performed. Stent was removed from the biliary tree. One plastic stent placed\par 1/5 CT A/P Heterogeneity of the right hepatic lobe with questionable 1.3 cm hypoattenuating lesion in segment 7.\par 1/8 MRI Abd: No suspicious liver lesions. No abnormality is identified to correspond with questionable lesion identified on prior CT. 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  Taken to the OR for evacuation of hematoma, cessation of gastroduodenal artery bleeding with two hemostatic stitches, and placement of abthera VAC.  Required pressors in the SICU, then developed fever. \par 1/21-1/22: OR exploratory laparotomy, drainage of abdominal abscess, repair of abdominal wall hernia with Strattice mesh, b/ abdominal wall advancement flaps. Post-op patient remained intubated and off vasopressors. FENA calculated to be pre-renal. 500CC albumin bolus given, urine output improved. 1/22/19 attempted spontaneous breathing trials but pt hypoxic. Pt lactate uptrending, resuscitated w/IVF, and pt hypotensive requiring pressors. UCx growing candida, Diflucan changed to micafungin to r/o resistant organisms in the setting of worsening sepsis. \par 1/23/19, patient noted to have blood oozing from midline incision while hypotensive and on pressors. Peak pressures noted to be elevated on vent and abdomen increasingly distended. All staples removed and a large amount of clot evacuated from midline incision. 1U PRBC given. No active bleeding appreciated. Fascia remains closed. Wound packed with wet to dry dressing. Vitamin K given for elevated INR. \par 1/24-1/29: remained in SICU, started on TPN, diuresed, finally extubated. \par 2/1: septic shock again, pressors restarted\par 2/2-2/11: CT scan with peritonitis, new abscesses. Ab and Antifungals started. Wound with increase in drainage. Octreotide started for increased output \par 2/12: drainage of abscesses by IR.  \par 2/18: Wound was partially closed by plastics with interrupted sutures and an ostomy appliance was placed, VAC removed.\par 3/1/19. 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\par 3/14/22: CT A/P: nodular hepatic contour, stomach is distended, improvement in hematoma \par 7/1/22: PET/CT: Masslike consolidation in the anterior left upper lung with intense uptake and left paratracheal lymph node, small focus of uptake in the right hepatic lobe near the severino hepatis suspicious for metastatic lesion.\par 7/19-7/22/22: [facility] admission s/p bronchoscopy 7/20/22; 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) \par  [de-identified] : adenocarcinoma [de-identified] : CA [id number] [de-identified] : FINAL PATHOLOGY:\par -Invasive adenocarcinoma of the duodenal ampulla (Tumor size: 2.5 x 1.5 x 0.8 cm); Tumor stage: pT3b pN1\par -Vascular and perineural invasion identified\par -1/24 lymph nodes involved by adenocarcinoma\par -Negative margins\par -S/p cholecystectomy ; acute cholecystitis with cholelithiasis \par -Bile duct margin with acute inflammation and focal atypia consistent with low grade dysplasia. \par  [FreeTextEntry1] : recurrent disease, not yet started back on DMT  [de-identified] : + cough, not relieved with Robitussin, promethazine-codeine is mildly effective\par L upper back pain, mild. \par poor appetite, lost 2 lbs since last visit. No n/v. + fatigue.

## 2022-09-02 ENCOUNTER — OUTPATIENT (OUTPATIENT)
Dept: OUTPATIENT SERVICES | Facility: HOSPITAL | Age: 57
LOS: 1 days | Discharge: HOME | End: 2022-09-02

## 2022-09-02 DIAGNOSIS — Z51.11 ENCOUNTER FOR ANTINEOPLASTIC CHEMOTHERAPY: Chronic | ICD-10-CM

## 2022-09-02 DIAGNOSIS — C24.1 MALIGNANT NEOPLASM OF AMPULLA OF VATER: ICD-10-CM

## 2022-09-02 DIAGNOSIS — Z98.890 OTHER SPECIFIED POSTPROCEDURAL STATES: Chronic | ICD-10-CM

## 2022-09-02 DIAGNOSIS — Z90.410 ACQUIRED TOTAL ABSENCE OF PANCREAS: Chronic | ICD-10-CM

## 2022-09-02 DIAGNOSIS — K92.2 GASTROINTESTINAL HEMORRHAGE, UNSPECIFIED: Chronic | ICD-10-CM

## 2022-09-06 ENCOUNTER — RESULT REVIEW (OUTPATIENT)
Age: 57
End: 2022-09-06

## 2022-09-06 ENCOUNTER — NON-APPOINTMENT (OUTPATIENT)
Age: 57
End: 2022-09-06

## 2022-09-06 ENCOUNTER — APPOINTMENT (OUTPATIENT)
Dept: INFUSION THERAPY | Facility: HOSPITAL | Age: 57
End: 2022-09-06

## 2022-09-06 LAB
BASOPHILS # BLD AUTO: 0.1 K/UL — SIGNIFICANT CHANGE UP (ref 0–0.2)
BASOPHILS NFR BLD AUTO: 3 % — HIGH (ref 0–2)
EOSINOPHIL # BLD AUTO: 0.27 K/UL — SIGNIFICANT CHANGE UP (ref 0–0.5)
EOSINOPHIL NFR BLD AUTO: 8 % — HIGH (ref 0–6)
HCT VFR BLD CALC: 28.4 % — LOW (ref 34.5–45)
HGB BLD-MCNC: 9.4 G/DL — LOW (ref 11.5–15.5)
LYMPHOCYTES # BLD AUTO: 1.33 K/UL — SIGNIFICANT CHANGE UP (ref 1–3.3)
LYMPHOCYTES # BLD AUTO: 39 % — SIGNIFICANT CHANGE UP (ref 13–44)
MCHC RBC-ENTMCNC: 24.8 PG — LOW (ref 27–34)
MCHC RBC-ENTMCNC: 33.1 G/DL — SIGNIFICANT CHANGE UP (ref 32–36)
MCV RBC AUTO: 74.9 FL — LOW (ref 80–100)
MONOCYTES # BLD AUTO: 0.89 K/UL — SIGNIFICANT CHANGE UP (ref 0–0.9)
MONOCYTES NFR BLD AUTO: 26 % — HIGH (ref 2–14)
NEUTROPHILS # BLD AUTO: 0.82 K/UL — LOW (ref 1.8–7.4)
NEUTROPHILS NFR BLD AUTO: 24 % — LOW (ref 43–77)
NRBC # BLD: 0 /100 — SIGNIFICANT CHANGE UP (ref 0–0)
NRBC # BLD: SIGNIFICANT CHANGE UP /100 WBCS (ref 0–0)
PLAT MORPH BLD: NORMAL — SIGNIFICANT CHANGE UP
PLATELET # BLD AUTO: 111 K/UL — LOW (ref 150–400)
RBC # BLD: 3.79 M/UL — LOW (ref 3.8–5.2)
RBC # FLD: 15.6 % — HIGH (ref 10.3–14.5)
RBC BLD AUTO: SIGNIFICANT CHANGE UP
WBC # BLD: 3.41 K/UL — LOW (ref 3.8–10.5)
WBC # FLD AUTO: 3.41 K/UL — LOW (ref 3.8–10.5)

## 2022-09-06 PROCEDURE — 77338 DESIGN MLC DEVICE FOR IMRT: CPT | Mod: 26,59

## 2022-09-06 PROCEDURE — 77334 RADIATION TREATMENT AID(S): CPT | Mod: 26

## 2022-09-07 ENCOUNTER — APPOINTMENT (OUTPATIENT)
Dept: HEMATOLOGY ONCOLOGY | Facility: CLINIC | Age: 57
End: 2022-09-07

## 2022-09-07 DIAGNOSIS — B37.0 CANDIDAL STOMATITIS: ICD-10-CM

## 2022-09-07 PROCEDURE — 99215 OFFICE O/P EST HI 40 MIN: CPT | Mod: 95

## 2022-09-07 RX ORDER — GABAPENTIN 300 MG/1
300 CAPSULE ORAL TWICE DAILY
Qty: 60 | Refills: 1 | Status: COMPLETED | COMMUNITY
Start: 2021-08-04 | End: 2022-09-07

## 2022-09-07 RX ORDER — BISACODYL 5 MG/1
5 TABLET ORAL TWICE DAILY
Qty: 60 | Refills: 5 | Status: ACTIVE | COMMUNITY
Start: 2022-09-07 | End: 1900-01-01

## 2022-09-07 RX ORDER — SENNOSIDES 8.6 MG TABLETS 8.6 MG/1
8.6 TABLET ORAL
Qty: 60 | Refills: 1 | Status: COMPLETED | COMMUNITY
Start: 2021-05-10 | End: 2022-09-07

## 2022-09-07 RX ORDER — LORATADINE 10 MG
17 TABLET,DISINTEGRATING ORAL DAILY
Qty: 1 | Refills: 2 | Status: COMPLETED | COMMUNITY
Start: 2021-05-10 | End: 2022-09-07

## 2022-09-07 NOTE — PHYSICAL EXAM
[Normal] : affect appropriate [Thrush] : thrush [Ambulatory and capable of all self care but unable to carry out any work activities] : Status 2- Ambulatory and capable of all self care but unable to carry out any work activities. Up and about more than 50% of waking hours [de-identified] : ambulating with cane [de-identified] : mild ecchymosis dispersed along anterior neck

## 2022-09-07 NOTE — REASON FOR VISIT
[Follow-Up Visit] : a follow-up [Other: _____] : [unfilled] [Home] : at home, [unfilled] , at the time of the visit. [Medical Office: ([facility])___] : at the medical office located in  [Family Member] : family member [FreeTextEntry2] : Olegario [FreeTextEntry3] : daughter

## 2022-09-07 NOTE — HISTORY OF PRESENT ILLNESS
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 \par \par Initially was admitted to [facility] on 12/18/18 with dizziness, found to have elevated LFTs. MRCP demonstrated a dilatation of the CBD. ERCP on 12/22/18 performed c/w ampullary mass with obstruction. Pathology c/w adenocarcinoma. Pt was discharged for out pt follow up with surg onc and med onc but was re-admitted on 1/5/19 with n/v, worsening abdominal pain, found to have severe sepsis with E coli bacteremia 2/2 acute cholangitis. \par \par 1/5/19: CT A/P Heterogeneity of the right hepatic lobe with questionable 1.3 cm hypoattenuating lesion in segment 7.\par 1/7/19: ERCP performed. Stent was removed from the biliary tree. One plastic stent placed\par 1/8/19: MRI Abd: No suspicious liver lesions. No abnormality is identified to correspond with questionable lesion identified on prior CT. 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  Taken to the OR for evacuation of hematoma, cessation of gastroduodenal artery bleeding with two hemostatic stitches, and placement of abthera VAC.  Required pressors in the SICU, then developed fever. \par 1/21-1/22/19: OR exploratory laparotomy, drainage of abdominal abscess, repair of abdominal wall hernia with Strattice mesh, b/ abdominal wall advancement flaps. Post-op patient remained intubated and off vasopressors. FENA calculated to be pre-renal. 500 CC albumin bolus given, urine output improved. 1/22/19 attempted spontaneous breathing trials but pt hypoxic. Pt lactate uptrend, resuscitated w/IVF, and pt hypotensive requiring pressors. UCx growing candida, Diflucan changed to micafungin to r/o resistant organisms in the setting of worsening sepsis. \par 1/23/19: patient noted to have blood oozing from midline incision while hypotensive and on pressors. Peak pressures noted to be elevated on vent and abdomen increasingly distended. All staples removed and a large amount of clot evacuated from midline incision. 1U PRBC given. No active bleeding appreciated. Fascia remains closed. Wound packed with wet to dry dressing. Vitamin K given for elevated INR. \par 1/24-1/29/19: remained in SICU, started on TPN, diuresed, finally extubated. \par 2/1/19: septic shock again, pressors restarted\par 2/2-2/11/19: CT scan with peritonitis, new abscesses. Ab and Antifungals started. Wound with increase in drainage. Octreotide started for increased output \par 2/12/19: drainage of abscesses by IR.  \par 2/18/19: Wound was partially closed by plastics with interrupted sutures and an ostomy appliance was placed, VAC removed.\par 3/1/19. d/c home \par 4/30-10/21/19: s/p 8 cycles Gemzar/Xeloda chemotherapy\par 10/10/19: CT CAP: no evidence of mets\par 1/20-11/2020: prolonged stay in [facility] due to COVID \par 11/6/20: CT CAP: 2 mildly enlarged mesenteric LN, attention in f/u\par 5/19/21: CT CAP revealed stable exam, unchanged cardiophrenic and mesenteric root lymph nodes\par 12/7/21: CT CAP: stable disease\par 1/21/22: ventral hernia repair \par 3/10/22: CT abd/pel: significant interval improvement of previously described rectus/anterior abdominal wall hematoma, previously described focal hematoma at the left anterior abdominal wall is decreased in size\par 7/1/22: PET/CT: Masslike consolidation in the anterior left upper lung with intense uptake and left paratracheal lymph node, small focus of uptake in the right hepatic lobe near the severino hepatis suspicious for metastatic lesion.\par 7/19-7/22/22: [facility] admission s/p bronchoscopy 7/20/22; 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 \par  [de-identified] : Patient's tx was held due to low ANC count. She continued to report persistent cough (uses Robitussin and Promethazine/Codeine cough syrup 10 ml q 4-6 hrs, it provides mild/temporary relief). Her fatigue, peripheral neuropathy of b/l hands + feet, chest and back pain remain unchanged (uses Oxycodone PRN). Patient's daughter is making a conscious effort to reinforce her oral intake (stable weight noted today). Her bowel movements remain regular (normal in color, consistency, frequency).

## 2022-09-10 ENCOUNTER — APPOINTMENT (OUTPATIENT)
Dept: INFUSION THERAPY | Facility: HOSPITAL | Age: 57
End: 2022-09-10

## 2022-09-10 LAB
CULTURE RESULTS: SIGNIFICANT CHANGE UP
SPECIMEN SOURCE: SIGNIFICANT CHANGE UP

## 2022-09-12 DIAGNOSIS — Z51.89 ENCOUNTER FOR OTHER SPECIFIED AFTERCARE: ICD-10-CM

## 2022-09-13 ENCOUNTER — APPOINTMENT (OUTPATIENT)
Dept: INFUSION THERAPY | Facility: HOSPITAL | Age: 57
End: 2022-09-13

## 2022-09-13 ENCOUNTER — RESULT REVIEW (OUTPATIENT)
Age: 57
End: 2022-09-13

## 2022-09-13 ENCOUNTER — NON-APPOINTMENT (OUTPATIENT)
Age: 57
End: 2022-09-13

## 2022-09-13 LAB
BASOPHILS # BLD AUTO: 0.1 K/UL — SIGNIFICANT CHANGE UP (ref 0–0.2)
BASOPHILS NFR BLD AUTO: 0.8 % — SIGNIFICANT CHANGE UP (ref 0–2)
EOSINOPHIL # BLD AUTO: 0.33 K/UL — SIGNIFICANT CHANGE UP (ref 0–0.5)
EOSINOPHIL NFR BLD AUTO: 2.6 % — SIGNIFICANT CHANGE UP (ref 0–6)
HCT VFR BLD CALC: 30.9 % — LOW (ref 34.5–45)
HGB BLD-MCNC: 10.3 G/DL — LOW (ref 11.5–15.5)
IMM GRANULOCYTES NFR BLD AUTO: 1.7 % — HIGH (ref 0–1.5)
LYMPHOCYTES # BLD AUTO: 1.83 K/UL — SIGNIFICANT CHANGE UP (ref 1–3.3)
LYMPHOCYTES # BLD AUTO: 14.4 % — SIGNIFICANT CHANGE UP (ref 13–44)
MCHC RBC-ENTMCNC: 25.4 PG — LOW (ref 27–34)
MCHC RBC-ENTMCNC: 33.3 G/DL — SIGNIFICANT CHANGE UP (ref 32–36)
MCV RBC AUTO: 76.1 FL — LOW (ref 80–100)
MONOCYTES # BLD AUTO: 1.13 K/UL — HIGH (ref 0–0.9)
MONOCYTES NFR BLD AUTO: 8.9 % — SIGNIFICANT CHANGE UP (ref 2–14)
NEUTROPHILS # BLD AUTO: 9.1 K/UL — HIGH (ref 1.8–7.4)
NEUTROPHILS NFR BLD AUTO: 71.6 % — SIGNIFICANT CHANGE UP (ref 43–77)
NRBC # BLD: 0 /100 WBCS — SIGNIFICANT CHANGE UP (ref 0–0)
PLATELET # BLD AUTO: 166 K/UL — SIGNIFICANT CHANGE UP (ref 150–400)
RBC # BLD: 4.06 M/UL — SIGNIFICANT CHANGE UP (ref 3.8–5.2)
RBC # FLD: 16.1 % — HIGH (ref 10.3–14.5)
WBC # BLD: 12.71 K/UL — HIGH (ref 3.8–10.5)
WBC # FLD AUTO: 12.71 K/UL — HIGH (ref 3.8–10.5)

## 2022-09-14 DIAGNOSIS — Z51.11 ENCOUNTER FOR ANTINEOPLASTIC CHEMOTHERAPY: ICD-10-CM

## 2022-09-14 DIAGNOSIS — R11.2 NAUSEA WITH VOMITING, UNSPECIFIED: ICD-10-CM

## 2022-09-14 LAB
ALBUMIN SERPL ELPH-MCNC: 3.8 G/DL — SIGNIFICANT CHANGE UP (ref 3.3–5)
ALP SERPL-CCNC: 183 U/L — HIGH (ref 40–120)
ALT FLD-CCNC: 31 U/L — SIGNIFICANT CHANGE UP (ref 10–45)
ANION GAP SERPL CALC-SCNC: 16 MMOL/L — SIGNIFICANT CHANGE UP (ref 5–17)
AST SERPL-CCNC: 46 U/L — HIGH (ref 10–40)
BILIRUB SERPL-MCNC: 0.5 MG/DL — SIGNIFICANT CHANGE UP (ref 0.2–1.2)
BUN SERPL-MCNC: 8 MG/DL — SIGNIFICANT CHANGE UP (ref 7–23)
CALCIUM SERPL-MCNC: 9.1 MG/DL — SIGNIFICANT CHANGE UP (ref 8.4–10.5)
CANCER AG19-9 SERPL-ACNC: 393 U/ML — HIGH
CHLORIDE SERPL-SCNC: 103 MMOL/L — SIGNIFICANT CHANGE UP (ref 96–108)
CO2 SERPL-SCNC: 21 MMOL/L — LOW (ref 22–31)
CREAT SERPL-MCNC: 0.52 MG/DL — SIGNIFICANT CHANGE UP (ref 0.5–1.3)
EGFR: 109 ML/MIN/1.73M2 — SIGNIFICANT CHANGE UP
GLUCOSE SERPL-MCNC: 222 MG/DL — HIGH (ref 70–99)
POTASSIUM SERPL-MCNC: 4.4 MMOL/L — SIGNIFICANT CHANGE UP (ref 3.5–5.3)
POTASSIUM SERPL-SCNC: 4.4 MMOL/L — SIGNIFICANT CHANGE UP (ref 3.5–5.3)
PROT SERPL-MCNC: 7 G/DL — SIGNIFICANT CHANGE UP (ref 6–8.3)
SODIUM SERPL-SCNC: 140 MMOL/L — SIGNIFICANT CHANGE UP (ref 135–145)

## 2022-09-21 PROCEDURE — 77301 RADIOTHERAPY DOSE PLAN IMRT: CPT | Mod: 26

## 2022-09-21 PROCEDURE — 77300 RADIATION THERAPY DOSE PLAN: CPT | Mod: 26

## 2022-09-21 PROCEDURE — 77338 DESIGN MLC DEVICE FOR IMRT: CPT | Mod: 26

## 2022-09-22 ENCOUNTER — APPOINTMENT (OUTPATIENT)
Dept: HEMATOLOGY ONCOLOGY | Facility: CLINIC | Age: 57
End: 2022-09-22

## 2022-09-22 PROCEDURE — 99205 OFFICE O/P NEW HI 60 MIN: CPT | Mod: 95

## 2022-09-26 PROCEDURE — 77387B: CUSTOM | Mod: 26

## 2022-09-27 ENCOUNTER — APPOINTMENT (OUTPATIENT)
Dept: SURGICAL ONCOLOGY | Facility: CLINIC | Age: 57
End: 2022-09-27

## 2022-09-27 ENCOUNTER — RESULT REVIEW (OUTPATIENT)
Age: 57
End: 2022-09-27

## 2022-09-27 ENCOUNTER — APPOINTMENT (OUTPATIENT)
Dept: HEMATOLOGY ONCOLOGY | Facility: CLINIC | Age: 57
End: 2022-09-27

## 2022-09-27 ENCOUNTER — APPOINTMENT (OUTPATIENT)
Dept: INFUSION THERAPY | Facility: HOSPITAL | Age: 57
End: 2022-09-27

## 2022-09-27 LAB
BASOPHILS # BLD AUTO: 0.03 K/UL — SIGNIFICANT CHANGE UP (ref 0–0.2)
BASOPHILS NFR BLD AUTO: 0.8 % — SIGNIFICANT CHANGE UP (ref 0–2)
EOSINOPHIL # BLD AUTO: 0.23 K/UL — SIGNIFICANT CHANGE UP (ref 0–0.5)
EOSINOPHIL NFR BLD AUTO: 6.5 % — HIGH (ref 0–6)
HCT VFR BLD CALC: 27.8 % — LOW (ref 34.5–45)
HGB BLD-MCNC: 9.1 G/DL — LOW (ref 11.5–15.5)
IMM GRANULOCYTES NFR BLD AUTO: 1.1 % — HIGH (ref 0–0.9)
LYMPHOCYTES # BLD AUTO: 1.11 K/UL — SIGNIFICANT CHANGE UP (ref 1–3.3)
LYMPHOCYTES # BLD AUTO: 31.4 % — SIGNIFICANT CHANGE UP (ref 13–44)
MCHC RBC-ENTMCNC: 24.4 PG — LOW (ref 27–34)
MCHC RBC-ENTMCNC: 32.7 G/DL — SIGNIFICANT CHANGE UP (ref 32–36)
MCV RBC AUTO: 74.5 FL — LOW (ref 80–100)
MONOCYTES # BLD AUTO: 0.93 K/UL — HIGH (ref 0–0.9)
MONOCYTES NFR BLD AUTO: 26.3 % — HIGH (ref 2–14)
NEUTROPHILS # BLD AUTO: 1.19 K/UL — LOW (ref 1.8–7.4)
NEUTROPHILS NFR BLD AUTO: 33.9 % — LOW (ref 43–77)
NRBC # BLD: 0 /100 WBCS — SIGNIFICANT CHANGE UP (ref 0–0)
PLATELET # BLD AUTO: 195 K/UL — SIGNIFICANT CHANGE UP (ref 150–400)
RBC # BLD: 3.73 M/UL — LOW (ref 3.8–5.2)
RBC # FLD: 15.8 % — HIGH (ref 10.3–14.5)
WBC # BLD: 3.53 K/UL — LOW (ref 3.8–10.5)
WBC # FLD AUTO: 3.53 K/UL — LOW (ref 3.8–10.5)

## 2022-09-27 PROCEDURE — 99214 OFFICE O/P EST MOD 30 MIN: CPT

## 2022-09-27 PROCEDURE — 99214 OFFICE O/P EST MOD 30 MIN: CPT | Mod: 95

## 2022-09-27 PROCEDURE — 77387B: CUSTOM | Mod: 26

## 2022-09-27 NOTE — HISTORY OF PRESENT ILLNESS
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 \par \par Initially was admitted to [facility] on 12/18/18 with dizziness, found to have elevated LFTs. MRCP demonstrated a dilatation of the CBD. ERCP on 12/22/18 performed c/w ampullary mass with obstruction. Pathology c/w adenocarcinoma. Pt was discharged for out pt follow up with surg onc and med onc but was re-admitted on 1/5/19 with n/v, worsening abdominal pain, found to have severe sepsis with E coli bacteremia 2/2 acute cholangitis. \par \par 1/5/19: CT A/P Heterogeneity of the right hepatic lobe with questionable 1.3 cm hypoattenuating lesion in segment 7.\par 1/7/19: ERCP performed. Stent was removed from the biliary tree. One plastic stent placed\par 1/8/19: MRI Abd: No suspicious liver lesions. No abnormality is identified to correspond with questionable lesion identified on prior CT. 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  Taken to the OR for evacuation of hematoma, cessation of gastroduodenal artery bleeding with two hemostatic stitches, and placement of abthera VAC.  Required pressors in the SICU, then developed fever. \par 1/21-1/22/19: OR exploratory laparotomy, drainage of abdominal abscess, repair of abdominal wall hernia with Strattice mesh, b/ abdominal wall advancement flaps. Post-op patient remained intubated and off vasopressors. FENA calculated to be pre-renal. 500 CC albumin bolus given, urine output improved. 1/22/19 attempted spontaneous breathing trials but pt hypoxic. Pt lactate uptrend, resuscitated w/IVF, and pt hypotensive requiring pressors. UCx growing candida, Diflucan changed to micafungin to r/o resistant organisms in the setting of worsening sepsis. \par 1/23/19: patient noted to have blood oozing from midline incision while hypotensive and on pressors. Peak pressures noted to be elevated on vent and abdomen increasingly distended. All staples removed and a large amount of clot evacuated from midline incision. 1U PRBC given. No active bleeding appreciated. Fascia remains closed. Wound packed with wet to dry dressing. Vitamin K given for elevated INR. \par 1/24-1/29/19: remained in SICU, started on TPN, diuresed, finally extubated. \par 2/1/19: septic shock again, pressors restarted\par 2/2-2/11/19: CT scan with peritonitis, new abscesses. Ab and Antifungals started. Wound with increase in drainage. Octreotide started for increased output \par 2/12/19: drainage of abscesses by IR.  \par 2/18/19: Wound was partially closed by plastics with interrupted sutures and an ostomy appliance was placed, VAC removed.\par 3/1/19. d/c home \par 4/30-10/21/19: s/p 8 cycles Gemzar/Xeloda chemotherapy\par 10/10/19: CT CAP: no evidence of mets\par 1/20-11/2020: prolonged stay in [facility] due to COVID \par 11/6/20: CT CAP: 2 mildly enlarged mesenteric LN, attention in f/u\par 5/19/21: CT CAP revealed stable exam, unchanged cardiophrenic and mesenteric root lymph nodes\par 12/7/21: CT CAP: stable disease\par 1/21/22: ventral hernia repair \par 3/10/22: CT abd/pel: significant interval improvement of previously described rectus/anterior abdominal wall hematoma, previously described focal hematoma at the left anterior abdominal wall is decreased in size\par 7/1/22: PET/CT: Masslike consolidation in the anterior left upper lung with intense uptake and left paratracheal lymph node, small focus of uptake in the right hepatic lobe near the severino hepatis suspicious for metastatic lesion.\par 7/19-7/22/22: [facility] admission s/p bronchoscopy 7/20/22; 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 \par  [de-identified] : Notified by treatment room RN patient admits to persistent neuropathy located on her fingertips and toes.  Patient has history of carpal tunnel syndrome and was on Gabapentin 300 mg at bedtime.  She describes the neuropathy as "pins and needles" which is different from the sensation she had with carpal tunnel.  The neuropathy is located on her fingertips and toes.  It impair her QOL.  As per daughter at bedside, patient is now dropping items on the floor and is having issues with grabbing items.

## 2022-09-27 NOTE — REVIEW OF SYSTEMS
[Fatigue] : fatigue [Recent Change In Weight] : ~T recent weight change [Cough] : cough [Abdominal Pain] : abdominal pain [Joint Pain] : joint pain [Anxiety] : anxiety [Negative] : Allergic/Immunologic [de-identified] : +peripheral neuropathy

## 2022-09-27 NOTE — PHYSICAL EXAM
[Ambulatory and capable of all self care but unable to carry out any work activities] : Status 2- Ambulatory and capable of all self care but unable to carry out any work activities. Up and about more than 50% of waking hours [Thrush] : thrush [Normal] : affect appropriate [de-identified] : ambulating with cane [de-identified] : mild ecchymosis dispersed along anterior neck

## 2022-09-27 NOTE — REASON FOR VISIT
[Other: _____] : [unfilled] [Home] : at home, [unfilled] , at the time of the visit. [Medical Office: ([facility])___] : at the medical office located in  [Family Member] : family member [Urgent Visit] : an urgent  [FreeTextEntry2] : Olegario [FreeTextEntry3] : daughter

## 2022-09-28 ENCOUNTER — NON-APPOINTMENT (OUTPATIENT)
Age: 57
End: 2022-09-28

## 2022-09-28 DIAGNOSIS — E86.0 DEHYDRATION: ICD-10-CM

## 2022-09-28 PROCEDURE — 77387B: CUSTOM | Mod: 26

## 2022-09-29 ENCOUNTER — NON-APPOINTMENT (OUTPATIENT)
Age: 57
End: 2022-09-29

## 2022-09-29 VITALS
RESPIRATION RATE: 17 BRPM | WEIGHT: 106.37 LBS | HEIGHT: 60 IN | DIASTOLIC BLOOD PRESSURE: 58 MMHG | TEMPERATURE: 98.78 F | HEART RATE: 81 BPM | OXYGEN SATURATION: 100 % | SYSTOLIC BLOOD PRESSURE: 99 MMHG | BODY MASS INDEX: 20.88 KG/M2

## 2022-09-29 PROCEDURE — 77387B: CUSTOM | Mod: 26

## 2022-09-29 NOTE — VITALS
[Least Pain Intensity: 4/10] : 4/10 [Opioid] : opioid [Maximal Pain Intensity: 7/10] : 7/10 [Pain Duration: ___] : Pain duration: [unfilled] [Pain Location: ___] : Pain Location: [unfilled] [Pain Interferes with ADLs] : Pain interferes with activities of daily living. [OTC] : OTC [60: Requires occasional assistance, but is able to care for most of his/her needs] : 60: Requires occasional assistance, but is able to care for most of his/her needs [ECOG Performance Status: 3 - Capable of only limited self care, confined to bed or chair more than 50% of waking hours] : Performance Status: 3 - Capable of only limited self care, confined to bed or chair more than 50% of waking hours

## 2022-09-29 NOTE — REVIEW OF SYSTEMS
[Dysphagia: Grade 0] : Dysphagia: Grade 0 [Esophagitis: Grade 0] : Esophagitis: Grade 0 [Nausea: Grade 1 - Loss of appetite without alteration in eating habits] : Nausea: Grade 1 - Loss of appetite without alteration in eating habits [Vomiting: Grade 0] : Vomiting: Grade 0 [Cough: Grade 0] : Cough: Grade 0 [Pneumonitis: Grade 0] : Pneumonitis: Grade 0 [Constipation: Grade 1 - Occasional or intermittent symptoms; occasional use of stool softeners, laxatives, dietary modification, or enema] : Constipation: Grade 1 - Occasional or intermittent symptoms; occasional use of stool softeners, laxatives, dietary modification, or enema [Diarrhea: Grade 0] : Diarrhea: Grade 0 [Fatigue: Grade 1 - Fatigue relieved by rest] : Fatigue: Grade 1 - Fatigue relieved by rest [Dyspnea: Grade 2 - Shortness of breath with minimal exertion; limiting instrumental ADL] : Dyspnea: Grade 2 - Shortness of breath with minimal exertion; limiting instrumental ADL

## 2022-09-30 PROCEDURE — 77014: CPT | Mod: 26

## 2022-09-30 PROCEDURE — 77427 RADIATION TX MANAGEMENT X5: CPT

## 2022-10-03 ENCOUNTER — APPOINTMENT (OUTPATIENT)
Dept: HEMATOLOGY ONCOLOGY | Facility: CLINIC | Age: 57
End: 2022-10-03

## 2022-10-03 PROCEDURE — 77387B: CUSTOM | Mod: 26

## 2022-10-03 NOTE — HISTORY OF PRESENT ILLNESS
[FreeTextEntry1] : 57yoF with ampullary pancreaticobiliary carcinoma presents for initial palliative care visit, referred by Dr. Navarro.\par PMH significant for DM on Metformin, HTN. \par \par Patient preferred language is Uzbek. She requests that her daughter Olegario speak on her behalf. \par \par Patient originally diagnosed with ampullary carcinoma 12/2018 after presenting with dizziness, found to have elevated LFTs. MRCP demonstrated a dilatation of the CBD. ERCP on 12/22/18 performed c/w ampullary mass with obstruction. Pathology c/w adenocarcinoma. On 1/11/19 pt underwent ex-lap, whipple pancreaticoduodenectomy for duodenal adenocarcinoma, post-op course c/b hemorrhagic shock, drop in H/H to 6/19 lactate 9. Taken to the OR for evacuation of hematoma, cessation of gastroduodenal artery bleeding with two hemostatic stitches, and placement of abthera VAC. Course further complicated by infection and prolonged SICU stay requiring intubation. \par \par 4/30-10/21/19: Received 8 cycles Gemzar/Xeloda \par 1/20-11/2020: prolonged stay in [facility] due to COVID \par 1/21/22: ventral hernia repair \par 3/10/22: CT abd/pel: significant interval improvement of previously described rectus/anterior abdominal wall hematoma, previously described focal hematoma at the left anterior abdominal wall is decreased in size\par 7/1/22: PET/CT: Masslike consolidation in the anterior left upper lung with intense uptake and left paratracheal lymph node, small focus of uptake in the right hepatic lobe near the severino hepatis suspicious for metastatic lesion.\par 7/19-7/22/22: J admission s/p bronchoscopy 7/20/22; 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  Has been very fatigued and weak. \par \par 9/7/22 Interval History: Patient's tx was held due to low ANC count. She continued to report persistent cough (uses Robitussin and Promethazine/Codeine cough syrup 10 ml q 4-6 hrs, it provides mild/temporary relief). Her fatigue, peripheral neuropathy of b/l hands + feet, chest and back pain remain unchanged (uses Oxycodone PRN). Patient's daughter is making a conscious effort to reinforce her oral intake (stable weight noted today). Her bowel movements remain regular (normal in color, consistency, frequency). \par \par \par Main reason for which patient is referred is pain and symptom management. \par She has been experiencing pain and burning in the upper abdomen, always present. No relation to eating. \par She uses Oxycodone IR 5mg on a PRN basis, is using it 1-2 times daily. Daughter notes that the pain spikes in the 2-4 days after chemotherapy. Pt rates her pain at 8/110 at its worst. Presently rates it at 0. \par \par ROS:\par +weakness - \par +chest/back pain - oxycodone IR 5mg\par +low appetite/ early satiety - daughter states that pt mainly eats rice, soup.  Pt desires very little other food.  She drinks ~1 Glucerna daily. \par +chronic cough - Promethazine/Codeine syrup\par +nausea, no vomiting - uses metoclopramide PRN \par +trouble sleeping at night \par +constipation - uses dulcolax daily. Last BM was yesterday. Averages a BM every 1-2 days. \par +cough - uses promethazine-codeine syrup PRN, Guaifenesin PRN - these help\par +generalized itching - started around the time she started chemotherapy last month. Particularly of the legs and neck. Daughter applies triamcinolone cream to her legs PRN, \par Denies mood changes, anxiety.  \par \par Ambulates with the assistance of a cane. She has a shower chair. No recent falls reported. \par \par Patient is , lives with her  and two children (ages 32, 20) in an apartment in the [place]. She ambulates with a cane when needed. Her [facility] hours of care a week. Her daughter assists with her ADL's. Pt spends her day reading and watching TV. She feels supported by family. \par \par PCP: Dr. Marisol Tripp ([place]) \par \par I-STOP Ref#:  [id number]

## 2022-10-03 NOTE — ASSESSMENT
[______] : HCP: [unfilled] [FreeTextEntry1] : 57yoF with:\par \par # Ampullary pancreaticobiliary carcinoma - C/w Gemzar/Abraxane; possible palliative RT to lung. Med Onc follow up. Rad Onc follow up. \par \par # Pain 2/2 Neoplasm - c/w PRN oxycodone IR 5mg, refill sent in. \par \par # Low appetite - Medical cannabis certification completed today. Provided cannabis education, overview of state program, and discussed adverse effects in detail. Counseled that vaporized cannabis is not the preferred route of administration due to the fact that both short-term and long-term risks associated with vaporizing oils are not yet fully understood. Recommend starting with 1:1 THC:CBD formulation at low dose of THC (~2mg/dose).\par \par # Generalized weakness - Patient has a poor PS, KPS ~50% presently. She requires assistance with all ADLs. It is recommended that she receive an increase in HHA hours given the level of help she requires, approximately 80 hours per week would be more appropriate.\par \par # Encounter for palliative care- Emotional support provided \par Explained the role of palliative care in enhancing quality of life in the setting of serious illness. \par \par Follow up in 1 month, call sooner with questions or issues.

## 2022-10-03 NOTE — REASON FOR VISIT
[Initial Evaluation] : an initial evaluation [Home] : at home, [unfilled] , at the time of the visit. [Medical Office: ([facility])___] : at the medical office located in  [Patient] : the patient [Other:____] : [unfilled] [Other: _____] : [unfilled] [FreeTextEntry2] : Olegario

## 2022-10-03 NOTE — PHYSICAL EXAM
[General Appearance - Alert] : alert [Affect] : the affect was normal [FreeTextEntry1] : weak-appearing, lying in bed

## 2022-10-03 NOTE — DATA REVIEWED
[FreeTextEntry1] : MRI abdomen  (07/21/2022): \par COMPARISON: CT abdomen and pelvis 3/10/2022.\par \par FINDINGS: Motion artifacts degrade some of the imaging.\par \par LOWER CHEST: Clustered prominent bilateral cardiophrenic lymph nodes \par measuring up to 1.5 x 0.8 cm on the right (12:10), previously measurement \par 1.5 x 0.8 cm.\par Stable approximately 1 cm nodular enhancement medially at the right \par posterior costophrenic sulcus\par \par LIVER: Cirrhosis. No focal hepatic lesions.\par BILE DUCTS: Post hepaticojejunostomy. Mild intrahepatic bile duct \par dilation.\par GALLBLADDER: Cholecystectomy.\par SPLEEN: Within normal limits.\par PANCREAS: Whipple procedure. Couple scattered pulmonary side branch ducts \par measuring under 5 mm. No main duct dilation.\par ADRENALS: Within normal limits.\par KIDNEYS/URETERS: Symmetric renal enhancement without hydronephrosis.\par \par VISUALIZED PORTIONS:\par BOWEL: Postoperative change. Unobstructed bowel. Large fecal load. \par Appendix is normal.\par PERITONEUM: No ascites.\par VESSELS: Within normal limits.\par RETROPERITONEUM/LYMPH NODES: No lymphadenopathy.\par ABDOMINAL WALL: Remote anterior abdominal wall postoperative changes. \par Resolved anterior abdominal wall collection\par BONES: Multilevel degenerative changes throughout the spine.\par \par IMPRESSION:\par Stable prominent right cardiophrenic lymph nodes and probably pleural nodular enhancement medially at the posterior right costophrenic sulcus\par Stable Whipple postoperative changes. No imaging evidence of locally recurrent or metastatic disease involving the abdomen\par Cirrhosis. No imaging evidence of portal hypertensive change\par ------\par PET CT  (07/01/2022): \par COMPARISON:  No prior PET CT scans.\par \par OTHER STUDIES USED FOR CORRELATION: CT abdomen obtained on 3/10/2022.\par \par FINDINGS:\par \par HEAD/NECK: Physiologic FDG activity in visualized brain, head, and neck.\par \par CHEST: No abnormal FDG activity. Stable right cardiophrenic node measuring 1.4 x 0.8 cm.\par \par LUNGS: Intensely hypermetabolic masslike consolidation in the anterior left upper lung with SUV max of 15.8\par 1.2 cm  hypermetabolic left paratracheal lymph node with SUV of 5.5\par \par PLEURA/PERICARDIUM: No abnormal FDG activity. No effusion.\par \par HEPATOBILIARY/PANCREAS:  Physiologic FDG activity. Liver SUV mean is 2.9.\par There is a small 1.4 cm hypermetabolic focus in the right hepatic lobe of the liver near the severino hepatis with SUV max of 4.3 suspicious for metastatic lesion. Further evaluation with MRI can be obtained.\par \par Pneumobilia.\par \par Pancreaticojejunostomy is noted. No focal uptake in this region. Vascular jejunostomy.\par \par SPLEEN: Physiologic FDG activity. Borderline in size measuring 11.8 x 8.1 x 10.3 cm.\par \par ADRENAL GLANDS: No abnormal FDG activity. No nodule.\par \par KIDNEYS/URINARY BLADDER: Physiologic excreted FDG activity.\par \par REPRODUCTIVE ORGANS: No abnormal FDG activity.\par \par ABDOMINOPELVIC NODES: Stable mesenteric root lymph nodes measuring up to 1 cm long axis dimension.\par \par BOWEL/PERITONEUM/MESENTERY: No abnormal FDG activity.\par \par BONES/SOFT TISSUES: Uptake is seen in the midline anterior abdominal wall consistent with postsurgical change. Resolution of anterior abdominal wall hematoma.\par \par IMPRESSION:\par Masslike consolidation in the anterior left upper lung with intense uptake and left paratracheal lymph node. Consider CT-guided biopsy to exclude metastatic disease.\par Small focus of uptake in the right hepatic lobe near the severino hepatis suspicious for metastatic lesion. Consider MRI of the liver with gadolinium.\par Post surgical changes in the pancreas and small bowel.\par Borderline spleen size.\par Postsurgical changes in the anterior midline abdominal wall with resolution of abdominal wall hematoma.\par Stable right cardiophrenic lymph node.

## 2022-10-04 PROCEDURE — 77387B: CUSTOM | Mod: 26

## 2022-10-05 PROCEDURE — 77387B: CUSTOM | Mod: 26

## 2022-10-06 ENCOUNTER — NON-APPOINTMENT (OUTPATIENT)
Age: 57
End: 2022-10-06

## 2022-10-06 VITALS
HEIGHT: 60 IN | HEART RATE: 87 BPM | DIASTOLIC BLOOD PRESSURE: 62 MMHG | SYSTOLIC BLOOD PRESSURE: 99 MMHG | WEIGHT: 104.17 LBS | TEMPERATURE: 96.98 F | RESPIRATION RATE: 17 BRPM | BODY MASS INDEX: 20.45 KG/M2 | OXYGEN SATURATION: 96 %

## 2022-10-06 PROCEDURE — 77387B: CUSTOM | Mod: 26

## 2022-10-06 NOTE — REVIEW OF SYSTEMS
[Constipation: Grade 1 - Occasional or intermittent symptoms; occasional use of stool softeners, laxatives, dietary modification, or enema] : Constipation: Grade 1 - Occasional or intermittent symptoms; occasional use of stool softeners, laxatives, dietary modification, or enema [Diarrhea: Grade 0] : Diarrhea: Grade 0 [Dysphagia: Grade 0] : Dysphagia: Grade 0 [Esophagitis: Grade 0] : Esophagitis: Grade 0 [Nausea: Grade 1 - Loss of appetite without alteration in eating habits] : Nausea: Grade 1 - Loss of appetite without alteration in eating habits [Vomiting: Grade 0] : Vomiting: Grade 0 [Fatigue: Grade 1 - Fatigue relieved by rest] : Fatigue: Grade 1 - Fatigue relieved by rest [Cough: Grade 0] : Cough: Grade 0 [Dyspnea: Grade 2 - Shortness of breath with minimal exertion; limiting instrumental ADL] : Dyspnea: Grade 2 - Shortness of breath with minimal exertion; limiting instrumental ADL [Pneumonitis: Grade 0] : Pneumonitis: Grade 0

## 2022-10-07 ENCOUNTER — RESULT REVIEW (OUTPATIENT)
Age: 57
End: 2022-10-07

## 2022-10-07 ENCOUNTER — APPOINTMENT (OUTPATIENT)
Dept: HEMATOLOGY ONCOLOGY | Facility: CLINIC | Age: 57
End: 2022-10-07

## 2022-10-07 ENCOUNTER — APPOINTMENT (OUTPATIENT)
Dept: INFUSION THERAPY | Facility: HOSPITAL | Age: 57
End: 2022-10-07

## 2022-10-07 VITALS
DIASTOLIC BLOOD PRESSURE: 59 MMHG | HEART RATE: 83 BPM | RESPIRATION RATE: 16 BRPM | SYSTOLIC BLOOD PRESSURE: 90 MMHG | OXYGEN SATURATION: 99 % | WEIGHT: 103.99 LBS | TEMPERATURE: 96.6 F | BODY MASS INDEX: 20.31 KG/M2

## 2022-10-07 DIAGNOSIS — D70.1 AGRANULOCYTOSIS SECONDARY TO CANCER CHEMOTHERAPY: ICD-10-CM

## 2022-10-07 DIAGNOSIS — G62.9 POLYNEUROPATHY, UNSPECIFIED: ICD-10-CM

## 2022-10-07 DIAGNOSIS — T45.1X5A AGRANULOCYTOSIS SECONDARY TO CANCER CHEMOTHERAPY: ICD-10-CM

## 2022-10-07 LAB
BASOPHILS # BLD AUTO: 0.02 K/UL — SIGNIFICANT CHANGE UP (ref 0–0.2)
BASOPHILS NFR BLD AUTO: 0.5 % — SIGNIFICANT CHANGE UP (ref 0–2)
EOSINOPHIL # BLD AUTO: 0.26 K/UL — SIGNIFICANT CHANGE UP (ref 0–0.5)
EOSINOPHIL NFR BLD AUTO: 7 % — HIGH (ref 0–6)
HCT VFR BLD CALC: 27.9 % — LOW (ref 34.5–45)
HGB BLD-MCNC: 9.1 G/DL — LOW (ref 11.5–15.5)
IMM GRANULOCYTES NFR BLD AUTO: 0.3 % — SIGNIFICANT CHANGE UP (ref 0–0.9)
LYMPHOCYTES # BLD AUTO: 0.78 K/UL — LOW (ref 1–3.3)
LYMPHOCYTES # BLD AUTO: 20.9 % — SIGNIFICANT CHANGE UP (ref 13–44)
MCHC RBC-ENTMCNC: 24.5 PG — LOW (ref 27–34)
MCHC RBC-ENTMCNC: 32.6 G/DL — SIGNIFICANT CHANGE UP (ref 32–36)
MCV RBC AUTO: 75.2 FL — LOW (ref 80–100)
MONOCYTES # BLD AUTO: 0.83 K/UL — SIGNIFICANT CHANGE UP (ref 0–0.9)
MONOCYTES NFR BLD AUTO: 22.2 % — HIGH (ref 2–14)
NEUTROPHILS # BLD AUTO: 1.84 K/UL — SIGNIFICANT CHANGE UP (ref 1.8–7.4)
NEUTROPHILS NFR BLD AUTO: 49.1 % — SIGNIFICANT CHANGE UP (ref 43–77)
NRBC # BLD: 0 /100 WBCS — SIGNIFICANT CHANGE UP (ref 0–0)
PLATELET # BLD AUTO: 119 K/UL — LOW (ref 150–400)
RBC # BLD: 3.71 M/UL — LOW (ref 3.8–5.2)
RBC # FLD: 15.8 % — HIGH (ref 10.3–14.5)
WBC # BLD: 3.74 K/UL — LOW (ref 3.8–10.5)
WBC # FLD AUTO: 3.74 K/UL — LOW (ref 3.8–10.5)

## 2022-10-07 PROCEDURE — 77427 RADIATION TX MANAGEMENT X5: CPT

## 2022-10-07 PROCEDURE — 99215 OFFICE O/P EST HI 40 MIN: CPT

## 2022-10-07 PROCEDURE — 77014: CPT | Mod: 26

## 2022-10-08 PROBLEM — D70.1 CHEMOTHERAPY-INDUCED NEUTROPENIA: Status: ACTIVE | Noted: 2022-09-07

## 2022-10-08 PROBLEM — G62.9 PERIPHERAL NEUROPATHY: Status: ACTIVE | Noted: 2022-09-27

## 2022-10-08 NOTE — HISTORY OF PRESENT ILLNESS
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 \par \par Initially was admitted to [facility] on 12/18/18 with dizziness, found to have elevated LFTs. MRCP demonstrated a dilatation of the CBD. ERCP on 12/22/18 performed c/w ampullary mass with obstruction. Pathology c/w adenocarcinoma. Pt was discharged for out pt follow up with surg onc and med onc but was re-admitted on 1/5/19 with n/v, worsening abdominal pain, found to have severe sepsis with E coli bacteremia 2/2 acute cholangitis. \par \par 1/5/19: CT A/P Heterogeneity of the right hepatic lobe with questionable 1.3 cm hypoattenuating lesion in segment 7.\par 1/7/19: ERCP performed. Stent was removed from the biliary tree. One plastic stent placed\par 1/8/19: MRI Abd: No suspicious liver lesions. No abnormality is identified to correspond with questionable lesion identified on prior CT. 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  Taken to the OR for evacuation of hematoma, cessation of gastroduodenal artery bleeding with two hemostatic stitches, and placement of abthera VAC.  Required pressors in the SICU, then developed fever. \par 1/21-1/22/19: OR exploratory laparotomy, drainage of abdominal abscess, repair of abdominal wall hernia with Strattice mesh, b/ abdominal wall advancement flaps. Post-op patient remained intubated and off vasopressors. FENA calculated to be pre-renal. 500 CC albumin bolus given, urine output improved. 1/22/19 attempted spontaneous breathing trials but pt hypoxic. Pt lactate uptrend, resuscitated w/IVF, and pt hypotensive requiring pressors. UCx growing candida, Diflucan changed to micafungin to r/o resistant organisms in the setting of worsening sepsis. \par 1/23/19: patient noted to have blood oozing from midline incision while hypotensive and on pressors. Peak pressures noted to be elevated on vent and abdomen increasingly distended. All staples removed and a large amount of clot evacuated from midline incision. 1U PRBC given. No active bleeding appreciated. Fascia remains closed. Wound packed with wet to dry dressing. Vitamin K given for elevated INR. \par 1/24-1/29/19: remained in SICU, started on TPN, diuresed, finally extubated. \par 2/1/19: septic shock again, pressors restarted\par 2/2-2/11/19: CT scan with peritonitis, new abscesses. Ab and Antifungals started. Wound with increase in drainage. Octreotide started for increased output \par 2/12/19: drainage of abscesses by IR.  \par 2/18/19: Wound was partially closed by plastics with interrupted sutures and an ostomy appliance was placed, VAC removed.\par 3/1/19. d/c home \par 4/30-10/21/19: s/p 8 cycles Gemzar/Xeloda chemotherapy\par 10/10/19: CT CAP: no evidence of mets\par 1/20-11/2020: prolonged stay in [facility] due to COVID \par 11/6/20: CT CAP: 2 mildly enlarged mesenteric LN, attention in f/u\par 5/19/21: CT CAP revealed stable exam, unchanged cardiophrenic and mesenteric root lymph nodes\par 12/7/21: CT CAP: stable disease\par 1/21/22: ventral hernia repair \par 3/10/22: CT abd/pel: significant interval improvement of previously described rectus/anterior abdominal wall hematoma, previously described focal hematoma at the left anterior abdominal wall is decreased in size\par 7/1/22: PET/CT: Masslike consolidation in the anterior left upper lung with intense uptake and left paratracheal lymph node, small focus of uptake in the right hepatic lobe near the severino hepatis suspicious for metastatic lesion.\par 7/19-7/22/22: [facility] admission s/p bronchoscopy 7/20/22; 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) \par 8/8-8/22/22: [id number] Gemzar/Abraxane\par 9/6/22: tx held (ANC 0.82)\par 9/13-9/27/22: [id number] Gemzar/Abraxane (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) [de-identified] : adenocarcinoma [de-identified] : CA [id number] [de-identified] : FINAL PATHOLOGY:\par -Invasive adenocarcinoma of the duodenal ampulla (Tumor size: 2.5 x 1.5 x 0.8 cm); Tumor stage: pT3b pN1\par -Vascular and perineural invasion identified\par -1/24 lymph nodes involved by adenocarcinoma\par -Negative margins\par -S/p cholecystectomy ; acute cholecystitis with cholelithiasis \par -Bile duct margin with acute inflammation and focal atypia consistent with low grade dysplasia. \par  [de-identified] : Patient is currently receiving RT to L lung (9/15 fx completed as of today). She continues to experience persistent neuropathy located on her fingertips and toes but it is less intense compared to last week. Patient has history of carpal tunnel syndrome and continues Gabapentin as directed. She describes the neuropathy as "pins and needles" which is different from the sensation she had with carpal tunnel. There are restrictions noted with completing her self-care ADLs due to her treatment related side effects and disease related symptoms (fatigue, shortness of breath, productive cough, decreased appetite, intermittent nausea/vomiting, peripheral neuropathy). She continues to experience productive cough (uses Robitussin and Promethazine/Codeine cough syrup 10 ml q 4-6 hrs, it provides mild/temporary relief). Patient's daughter is making a conscious effort to reinforce her oral intake (stable weight noted today). Her bowel movements remain regular (normal in color, consistency, frequency).

## 2022-10-08 NOTE — PHYSICAL EXAM
[de-identified] : ambulating with cane [de-identified] : mild ecchymosis dispersed along anterior neck

## 2022-10-10 PROCEDURE — 77387B: CUSTOM | Mod: 26

## 2022-10-11 ENCOUNTER — NON-APPOINTMENT (OUTPATIENT)
Age: 57
End: 2022-10-11

## 2022-10-11 ENCOUNTER — RESULT REVIEW (OUTPATIENT)
Age: 57
End: 2022-10-11

## 2022-10-11 ENCOUNTER — APPOINTMENT (OUTPATIENT)
Dept: INFUSION THERAPY | Facility: HOSPITAL | Age: 57
End: 2022-10-11

## 2022-10-11 LAB
ALBUMIN SERPL ELPH-MCNC: 3.8 G/DL — SIGNIFICANT CHANGE UP (ref 3.3–5)
ALP SERPL-CCNC: 157 U/L — HIGH (ref 40–120)
ALT FLD-CCNC: 25 U/L — SIGNIFICANT CHANGE UP (ref 10–45)
ANION GAP SERPL CALC-SCNC: 13 MMOL/L — SIGNIFICANT CHANGE UP (ref 5–17)
AST SERPL-CCNC: 35 U/L — SIGNIFICANT CHANGE UP (ref 10–40)
BASOPHILS # BLD AUTO: 0.03 K/UL — SIGNIFICANT CHANGE UP (ref 0–0.2)
BASOPHILS NFR BLD AUTO: 0.8 % — SIGNIFICANT CHANGE UP (ref 0–2)
BILIRUB SERPL-MCNC: 0.6 MG/DL — SIGNIFICANT CHANGE UP (ref 0.2–1.2)
BUN SERPL-MCNC: 8 MG/DL — SIGNIFICANT CHANGE UP (ref 7–23)
CALCIUM SERPL-MCNC: 9.6 MG/DL — SIGNIFICANT CHANGE UP (ref 8.4–10.5)
CHLORIDE SERPL-SCNC: 102 MMOL/L — SIGNIFICANT CHANGE UP (ref 96–108)
CO2 SERPL-SCNC: 23 MMOL/L — SIGNIFICANT CHANGE UP (ref 22–31)
CREAT SERPL-MCNC: 0.54 MG/DL — SIGNIFICANT CHANGE UP (ref 0.5–1.3)
EGFR: 108 ML/MIN/1.73M2 — SIGNIFICANT CHANGE UP
EOSINOPHIL # BLD AUTO: 0.34 K/UL — SIGNIFICANT CHANGE UP (ref 0–0.5)
EOSINOPHIL NFR BLD AUTO: 9.2 % — HIGH (ref 0–6)
GLUCOSE SERPL-MCNC: 132 MG/DL — HIGH (ref 70–99)
HCT VFR BLD CALC: 29.5 % — LOW (ref 34.5–45)
HGB BLD-MCNC: 9.6 G/DL — LOW (ref 11.5–15.5)
IMM GRANULOCYTES NFR BLD AUTO: 0.3 % — SIGNIFICANT CHANGE UP (ref 0–0.9)
LYMPHOCYTES # BLD AUTO: 0.66 K/UL — LOW (ref 1–3.3)
LYMPHOCYTES # BLD AUTO: 17.9 % — SIGNIFICANT CHANGE UP (ref 13–44)
MCHC RBC-ENTMCNC: 24.6 PG — LOW (ref 27–34)
MCHC RBC-ENTMCNC: 32.5 G/DL — SIGNIFICANT CHANGE UP (ref 32–36)
MCV RBC AUTO: 75.4 FL — LOW (ref 80–100)
MONOCYTES # BLD AUTO: 1.13 K/UL — HIGH (ref 0–0.9)
MONOCYTES NFR BLD AUTO: 30.6 % — HIGH (ref 2–14)
NEUTROPHILS # BLD AUTO: 1.52 K/UL — LOW (ref 1.8–7.4)
NEUTROPHILS NFR BLD AUTO: 41.2 % — LOW (ref 43–77)
NRBC # BLD: 0 /100 WBCS — SIGNIFICANT CHANGE UP (ref 0–0)
PLATELET # BLD AUTO: 147 K/UL — LOW (ref 150–400)
POTASSIUM SERPL-MCNC: 4.3 MMOL/L — SIGNIFICANT CHANGE UP (ref 3.5–5.3)
POTASSIUM SERPL-SCNC: 4.3 MMOL/L — SIGNIFICANT CHANGE UP (ref 3.5–5.3)
PROT SERPL-MCNC: 7.2 G/DL — SIGNIFICANT CHANGE UP (ref 6–8.3)
RBC # BLD: 3.91 M/UL — SIGNIFICANT CHANGE UP (ref 3.8–5.2)
RBC # FLD: 16.7 % — HIGH (ref 10.3–14.5)
SODIUM SERPL-SCNC: 137 MMOL/L — SIGNIFICANT CHANGE UP (ref 135–145)
WBC # BLD: 3.69 K/UL — LOW (ref 3.8–10.5)
WBC # FLD AUTO: 3.69 K/UL — LOW (ref 3.8–10.5)

## 2022-10-11 PROCEDURE — 77387B: CUSTOM | Mod: 26

## 2022-10-11 RX ORDER — LIDOCAINE HYDROCHLORIDE 20 MG/ML
2 SOLUTION ORAL; TOPICAL
Qty: 100 | Refills: 0 | Status: ACTIVE | COMMUNITY
Start: 2022-10-11 | End: 1900-01-01

## 2022-10-12 LAB — CANCER AG19-9 SERPL-ACNC: 315 U/ML — HIGH

## 2022-10-12 PROCEDURE — 77387B: CUSTOM | Mod: 26

## 2022-10-13 ENCOUNTER — NON-APPOINTMENT (OUTPATIENT)
Age: 57
End: 2022-10-13

## 2022-10-13 VITALS
BODY MASS INDEX: 20.42 KG/M2 | DIASTOLIC BLOOD PRESSURE: 67 MMHG | TEMPERATURE: 96.98 F | HEIGHT: 60 IN | HEART RATE: 92 BPM | RESPIRATION RATE: 17 BRPM | SYSTOLIC BLOOD PRESSURE: 106 MMHG | WEIGHT: 104 LBS | OXYGEN SATURATION: 100 %

## 2022-10-13 DIAGNOSIS — K20.90 ESOPHAGITIS, UNSPECIFIED WITHOUT BLEEDING: ICD-10-CM

## 2022-10-13 PROCEDURE — 77387B: CUSTOM | Mod: 26

## 2022-10-13 RX ORDER — DIPHENHYDRAMINE HYDROCHLORIDE AND LIDOCAINE HYDROCHLORIDE AND ALUMINUM HYDROXIDE AND MAGNESIUM HYDRO
KIT
Qty: 1 | Refills: 5 | Status: ACTIVE | COMMUNITY
Start: 2022-10-13 | End: 1900-01-01

## 2022-10-13 RX ORDER — SUCRALFATE 1 G/10ML
1 SUSPENSION ORAL 4 TIMES DAILY
Qty: 600 | Refills: 5 | Status: ACTIVE | COMMUNITY
Start: 2022-10-13 | End: 1900-01-01

## 2022-10-13 NOTE — HISTORY OF PRESENT ILLNESS
[FreeTextEntry1] : 58 yo F with diagnosed stage III, pT3N1b ampullary CA, s/p Whipple pancreaticoduodenectomy and 8 cycles Gemzar/Xeloda, recently found to have new mets to YUNI and left paratracheal lymph node, presents for discussion of RT for oligometastases to lung and for pain control.  With new found oligometastases to lung/LN, we discussed adding local radiation therapy for local control and possibly to improve disease free survival. Given the location and LN involvement, I did not feel SBRT would be appropriate. Hence I recommended a moderately hypofractionated course of radiation to the left lung/LN/hilum totaling 45Gy/15fx.\par \par 9/29/2022:  Patient is seen for OTV s/p 4/15 fx to L lung and continues on Gemzar/Abraxane.  She continues to take Oxycodone for whole body pain related to Gemzar.  Her appetite is liable and she continues to have baseline cough.  She has grade 3 CERDA at baseline. \par \par 10/6/2022: OTV 9/15 fractions.  She continues to have baseline dry cough, unchanged. She reports thick saliva in the past few days, with some sensation that it is difficult to swallow, without choking or regurgitation. Her weight is stable.  She denies throat pain.

## 2022-10-13 NOTE — VITALS
[Maximal Pain Intensity: 8/10] : 8/10 [Least Pain Intensity: 6/10] : 6/10 [Pain Description/Quality: ___] : Pain description/quality: [unfilled] [Pain Duration: ___] : Pain duration: [unfilled] [Pain Location: ___] : Pain Location: [unfilled] [Pain Interferes with ADLs] : Pain interferes with activities of daily living. [NSAID/Non-Opioid] : NSAID/Non-Opioid [60: Requires occasional assistance, but is able to care for most of his/her needs] : 60: Requires occasional assistance, but is able to care for most of his/her needs [ECOG Performance Status: 3 - Capable of only limited self care, confined to bed or chair more than 50% of waking hours] : Performance Status: 3 - Capable of only limited self care, confined to bed or chair more than 50% of waking hours

## 2022-10-13 NOTE — HISTORY OF PRESENT ILLNESS
[FreeTextEntry1] : 58 yo F with diagnosed stage III, pT3N1b ampullary CA, s/p Whipple pancreaticoduodenectomy and 8 cycles Gemzar/Xeloda, recently found to have new mets to YUNI and left paratracheal lymph node, presents for discussion of RT for oligometastases to lung and for pain control.  With new found oligometastases to lung/LN, we discussed adding local radiation therapy for local control and possibly to improve disease free survival. Given the location and LN involvement, I did not feel SBRT would be appropriate. Hence I recommended a moderately hypofractionated course of radiation to the left lung/LN/hilum totaling 45Gy/15fx.\par \par 9/29/2022:  Patient is seen for OTV s/p 4/15 fx to L lung and continues on Gemzar/Abraxane.  She continues to take Oxycodone for whole body pain related to Gemzar.  Her appetite is liable and she continues to have baseline cough.  She has grade 3 CERDA at baseline.

## 2022-10-13 NOTE — PHYSICAL EXAM
[Sclera] : the sclera and conjunctiva were normal [Extraocular Movements] : extraocular movements were intact [Outer Ear] : the ears and nose were normal in appearance [Normal] : oriented to person, place and time, the affect was normal, the mood was normal and not anxious [de-identified] : in wheelchair here/ambulatory with cane at home

## 2022-10-13 NOTE — VITALS
[Maximal Pain Intensity: 7/10] : 7/10 [Least Pain Intensity: 4/10] : 4/10 [Pain Duration: ___] : Pain duration: [unfilled] [Pain Location: ___] : Pain Location: [unfilled] [Pain Interferes with ADLs] : Pain interferes with activities of daily living. [OTC] : OTC [Opioid] : opioid [ECOG Performance Status: 3 - Capable of only limited self care, confined to bed or chair more than 50% of waking hours] : Performance Status: 3 - Capable of only limited self care, confined to bed or chair more than 50% of waking hours [60: Requires occasional assistance, but is able to care for most of his/her needs] : 60: Requires occasional assistance, but is able to care for most of his/her needs

## 2022-10-13 NOTE — DISEASE MANAGEMENT
[Clinical] : TNM Stage: c [IV] : IV [TTNM] : - [NTNM] : - [MTNM] : 1 [de-identified] : 9 fx/2700 cGy [de-identified] : 15 fx/4500  cGy [de-identified] : L lung

## 2022-10-13 NOTE — DISEASE MANAGEMENT
[Clinical] : TNM Stage: c [IV] : IV [TTNM] : - [NTNM] : - [MTNM] : 1 [de-identified] : 4 fx/1200 cGy [de-identified] : 15 fx/4500  cGy [de-identified] : L lung

## 2022-10-13 NOTE — PHYSICAL EXAM
[Extraocular Movements] : extraocular movements were intact [Outer Ear] : the ears and nose were normal in appearance [Normal] : oriented to person, place and time, the affect was normal, the mood was normal and not anxious [] : no respiratory distress [de-identified] : in wheelchair here/ambulatory with cane at home

## 2022-10-14 PROCEDURE — 77427 RADIATION TX MANAGEMENT X5: CPT

## 2022-10-14 PROCEDURE — 77014: CPT | Mod: 26

## 2022-10-21 ENCOUNTER — NON-APPOINTMENT (OUTPATIENT)
Age: 57
End: 2022-10-21

## 2022-10-21 ENCOUNTER — INPATIENT (INPATIENT)
Facility: HOSPITAL | Age: 57
LOS: 10 days | Discharge: HOME | End: 2022-11-01
Attending: STUDENT IN AN ORGANIZED HEALTH CARE EDUCATION/TRAINING PROGRAM | Admitting: STUDENT IN AN ORGANIZED HEALTH CARE EDUCATION/TRAINING PROGRAM

## 2022-10-21 VITALS
RESPIRATION RATE: 18 BRPM | HEIGHT: 58 IN | HEART RATE: 109 BPM | SYSTOLIC BLOOD PRESSURE: 107 MMHG | OXYGEN SATURATION: 100 % | DIASTOLIC BLOOD PRESSURE: 48 MMHG | TEMPERATURE: 100 F

## 2022-10-21 DIAGNOSIS — Z98.890 OTHER SPECIFIED POSTPROCEDURAL STATES: Chronic | ICD-10-CM

## 2022-10-21 DIAGNOSIS — Z90.410 ACQUIRED TOTAL ABSENCE OF PANCREAS: Chronic | ICD-10-CM

## 2022-10-21 DIAGNOSIS — K92.2 GASTROINTESTINAL HEMORRHAGE, UNSPECIFIED: Chronic | ICD-10-CM

## 2022-10-21 DIAGNOSIS — Z51.11 ENCOUNTER FOR ANTINEOPLASTIC CHEMOTHERAPY: Chronic | ICD-10-CM

## 2022-10-21 PROCEDURE — 99285 EMERGENCY DEPT VISIT HI MDM: CPT

## 2022-10-21 RX ORDER — SODIUM CHLORIDE 9 MG/ML
1000 INJECTION INTRAMUSCULAR; INTRAVENOUS; SUBCUTANEOUS ONCE
Refills: 0 | Status: COMPLETED | OUTPATIENT
Start: 2022-10-21 | End: 2022-10-21

## 2022-10-21 RX ORDER — ACETAMINOPHEN 500 MG
1000 TABLET ORAL ONCE
Refills: 0 | Status: COMPLETED | OUTPATIENT
Start: 2022-10-21 | End: 2022-10-22

## 2022-10-21 NOTE — ED ADULT TRIAGE NOTE - CHIEF COMPLAINT QUOTE
c/o fever/chills, back pain, generalized abd pain x3days.. Hx lung CA (chemo bi  weekly, last session Tuesday), DM, HLD

## 2022-10-22 DIAGNOSIS — A41.9 SEPSIS, UNSPECIFIED ORGANISM: ICD-10-CM

## 2022-10-22 DIAGNOSIS — K52.0 GASTROENTERITIS AND COLITIS DUE TO RADIATION: ICD-10-CM

## 2022-10-22 DIAGNOSIS — Z29.9 ENCOUNTER FOR PROPHYLACTIC MEASURES, UNSPECIFIED: ICD-10-CM

## 2022-10-22 DIAGNOSIS — K29.60 OTHER GASTRITIS WITHOUT BLEEDING: ICD-10-CM

## 2022-10-22 DIAGNOSIS — C24.1 MALIGNANT NEOPLASM OF AMPULLA OF VATER: ICD-10-CM

## 2022-10-22 DIAGNOSIS — E11.9 TYPE 2 DIABETES MELLITUS WITHOUT COMPLICATIONS: ICD-10-CM

## 2022-10-22 DIAGNOSIS — E78.5 HYPERLIPIDEMIA, UNSPECIFIED: ICD-10-CM

## 2022-10-22 DIAGNOSIS — R50.9 FEVER, UNSPECIFIED: ICD-10-CM

## 2022-10-22 LAB
A1C WITH ESTIMATED AVERAGE GLUCOSE RESULT: 7.4 % — HIGH (ref 4–5.6)
ALBUMIN SERPL ELPH-MCNC: 3.4 G/DL — SIGNIFICANT CHANGE UP (ref 3.3–5)
ALP SERPL-CCNC: 137 U/L — HIGH (ref 40–120)
ALT FLD-CCNC: 20 U/L — SIGNIFICANT CHANGE UP (ref 4–33)
ANION GAP SERPL CALC-SCNC: 9 MMOL/L — SIGNIFICANT CHANGE UP (ref 7–14)
ANISOCYTOSIS BLD QL: SLIGHT — SIGNIFICANT CHANGE UP
APPEARANCE UR: CLEAR — SIGNIFICANT CHANGE UP
AST SERPL-CCNC: 28 U/L — SIGNIFICANT CHANGE UP (ref 4–32)
B PERT DNA SPEC QL NAA+PROBE: SIGNIFICANT CHANGE UP
B PERT+PARAPERT DNA PNL SPEC NAA+PROBE: SIGNIFICANT CHANGE UP
BACTERIA # UR AUTO: ABNORMAL
BASE EXCESS BLDV CALC-SCNC: 0.5 MMOL/L — SIGNIFICANT CHANGE UP (ref -2–3)
BASOPHILS # BLD AUTO: 0.05 K/UL — SIGNIFICANT CHANGE UP (ref 0–0.2)
BASOPHILS NFR BLD AUTO: 1.7 % — SIGNIFICANT CHANGE UP (ref 0–2)
BILIRUB SERPL-MCNC: 0.9 MG/DL — SIGNIFICANT CHANGE UP (ref 0.2–1.2)
BILIRUB UR-MCNC: NEGATIVE — SIGNIFICANT CHANGE UP
BLOOD GAS VENOUS COMPREHENSIVE RESULT: SIGNIFICANT CHANGE UP
BORDETELLA PARAPERTUSSIS (RAPRVP): SIGNIFICANT CHANGE UP
BUN SERPL-MCNC: 7 MG/DL — SIGNIFICANT CHANGE UP (ref 7–23)
C PNEUM DNA SPEC QL NAA+PROBE: SIGNIFICANT CHANGE UP
CALCIUM SERPL-MCNC: 8.7 MG/DL — SIGNIFICANT CHANGE UP (ref 8.4–10.5)
CHLORIDE BLDV-SCNC: 100 MMOL/L — SIGNIFICANT CHANGE UP (ref 96–108)
CHLORIDE SERPL-SCNC: 99 MMOL/L — SIGNIFICANT CHANGE UP (ref 98–107)
CO2 BLDV-SCNC: 26.7 MMOL/L — HIGH (ref 22–26)
CO2 SERPL-SCNC: 24 MMOL/L — SIGNIFICANT CHANGE UP (ref 22–31)
COLOR SPEC: YELLOW — SIGNIFICANT CHANGE UP
CREAT SERPL-MCNC: 0.64 MG/DL — SIGNIFICANT CHANGE UP (ref 0.5–1.3)
DACRYOCYTES BLD QL SMEAR: SLIGHT — SIGNIFICANT CHANGE UP
DIFF PNL FLD: NEGATIVE — SIGNIFICANT CHANGE UP
EGFR: 104 ML/MIN/1.73M2 — SIGNIFICANT CHANGE UP
ELLIPTOCYTES BLD QL SMEAR: SLIGHT — SIGNIFICANT CHANGE UP
EOSINOPHIL # BLD AUTO: 0.16 K/UL — SIGNIFICANT CHANGE UP (ref 0–0.5)
EOSINOPHIL NFR BLD AUTO: 5.2 % — SIGNIFICANT CHANGE UP (ref 0–6)
EPI CELLS # UR: 9 /HPF — HIGH (ref 0–5)
ESTIMATED AVERAGE GLUCOSE: 166 — SIGNIFICANT CHANGE UP
FLUAV SUBTYP SPEC NAA+PROBE: SIGNIFICANT CHANGE UP
FLUBV RNA SPEC QL NAA+PROBE: SIGNIFICANT CHANGE UP
GAS PNL BLDV: 133 MMOL/L — LOW (ref 136–145)
GIANT PLATELETS BLD QL SMEAR: PRESENT — SIGNIFICANT CHANGE UP
GLUCOSE BLDC GLUCOMTR-MCNC: 130 MG/DL — HIGH (ref 70–99)
GLUCOSE BLDC GLUCOMTR-MCNC: 135 MG/DL — HIGH (ref 70–99)
GLUCOSE BLDC GLUCOMTR-MCNC: 146 MG/DL — HIGH (ref 70–99)
GLUCOSE BLDC GLUCOMTR-MCNC: 163 MG/DL — HIGH (ref 70–99)
GLUCOSE BLDC GLUCOMTR-MCNC: 170 MG/DL — HIGH (ref 70–99)
GLUCOSE BLDV-MCNC: 99 MG/DL — SIGNIFICANT CHANGE UP (ref 70–99)
GLUCOSE SERPL-MCNC: 105 MG/DL — HIGH (ref 70–99)
GLUCOSE UR QL: NEGATIVE — SIGNIFICANT CHANGE UP
HADV DNA SPEC QL NAA+PROBE: SIGNIFICANT CHANGE UP
HCO3 BLDV-SCNC: 25 MMOL/L — SIGNIFICANT CHANGE UP (ref 22–29)
HCOV 229E RNA SPEC QL NAA+PROBE: SIGNIFICANT CHANGE UP
HCOV HKU1 RNA SPEC QL NAA+PROBE: SIGNIFICANT CHANGE UP
HCOV NL63 RNA SPEC QL NAA+PROBE: SIGNIFICANT CHANGE UP
HCOV OC43 RNA SPEC QL NAA+PROBE: SIGNIFICANT CHANGE UP
HCT VFR BLD CALC: 25.6 % — LOW (ref 34.5–45)
HCT VFR BLDA CALC: 25 % — LOW (ref 34.5–46.5)
HGB BLD CALC-MCNC: 8.3 G/DL — LOW (ref 11.5–15.5)
HGB BLD-MCNC: 8.2 G/DL — LOW (ref 11.5–15.5)
HMPV RNA SPEC QL NAA+PROBE: SIGNIFICANT CHANGE UP
HPIV1 RNA SPEC QL NAA+PROBE: SIGNIFICANT CHANGE UP
HPIV2 RNA SPEC QL NAA+PROBE: SIGNIFICANT CHANGE UP
HPIV3 RNA SPEC QL NAA+PROBE: SIGNIFICANT CHANGE UP
HPIV4 RNA SPEC QL NAA+PROBE: SIGNIFICANT CHANGE UP
HYALINE CASTS # UR AUTO: 1 /LPF — SIGNIFICANT CHANGE UP (ref 0–7)
HYPOCHROMIA BLD QL: SLIGHT — SIGNIFICANT CHANGE UP
IANC: 1.32 K/UL — LOW (ref 1.8–7.4)
KETONES UR-MCNC: NEGATIVE — SIGNIFICANT CHANGE UP
LACTATE BLDV-MCNC: 2.1 MMOL/L — HIGH (ref 0.5–2)
LEUKOCYTE ESTERASE UR-ACNC: NEGATIVE — SIGNIFICANT CHANGE UP
LIDOCAIN IGE QN: 19 U/L — SIGNIFICANT CHANGE UP (ref 7–60)
LYMPHOCYTES # BLD AUTO: 0.35 K/UL — LOW (ref 1–3.3)
LYMPHOCYTES # BLD AUTO: 11.3 % — LOW (ref 13–44)
M PNEUMO DNA SPEC QL NAA+PROBE: SIGNIFICANT CHANGE UP
MAGNESIUM SERPL-MCNC: 2 MG/DL — SIGNIFICANT CHANGE UP (ref 1.6–2.6)
MCHC RBC-ENTMCNC: 23.9 PG — LOW (ref 27–34)
MCHC RBC-ENTMCNC: 32 GM/DL — SIGNIFICANT CHANGE UP (ref 32–36)
MCV RBC AUTO: 74.6 FL — LOW (ref 80–100)
MICROCYTES BLD QL: SIGNIFICANT CHANGE UP
MONOCYTES # BLD AUTO: 0.54 K/UL — SIGNIFICANT CHANGE UP (ref 0–0.9)
MONOCYTES NFR BLD AUTO: 17.4 % — HIGH (ref 2–14)
NEUTROPHILS # BLD AUTO: 1.91 K/UL — SIGNIFICANT CHANGE UP (ref 1.8–7.4)
NEUTROPHILS NFR BLD AUTO: 57.4 % — SIGNIFICANT CHANGE UP (ref 43–77)
NEUTS BAND # BLD: 4.4 % — SIGNIFICANT CHANGE UP (ref 0–6)
NITRITE UR-MCNC: NEGATIVE — SIGNIFICANT CHANGE UP
NRBC # BLD: 1 /100 — HIGH (ref 0–0)
OVALOCYTES BLD QL SMEAR: SLIGHT — SIGNIFICANT CHANGE UP
PCO2 BLDV: 41 MMHG — SIGNIFICANT CHANGE UP (ref 39–42)
PH BLDV: 7.4 — SIGNIFICANT CHANGE UP (ref 7.32–7.43)
PH UR: 6.5 — SIGNIFICANT CHANGE UP (ref 5–8)
PHOSPHATE SERPL-MCNC: 2 MG/DL — LOW (ref 2.5–4.5)
PLAT MORPH BLD: NORMAL — SIGNIFICANT CHANGE UP
PLATELET # BLD AUTO: 110 K/UL — LOW (ref 150–400)
PLATELET COUNT - ESTIMATE: ABNORMAL
PO2 BLDV: 21 MMHG — SIGNIFICANT CHANGE UP
POIKILOCYTOSIS BLD QL AUTO: SLIGHT — SIGNIFICANT CHANGE UP
POLYCHROMASIA BLD QL SMEAR: SLIGHT — SIGNIFICANT CHANGE UP
POTASSIUM BLDV-SCNC: 3.9 MMOL/L — SIGNIFICANT CHANGE UP (ref 3.5–5.1)
POTASSIUM SERPL-MCNC: 4 MMOL/L — SIGNIFICANT CHANGE UP (ref 3.5–5.3)
POTASSIUM SERPL-SCNC: 4 MMOL/L — SIGNIFICANT CHANGE UP (ref 3.5–5.3)
PROT SERPL-MCNC: 7.1 G/DL — SIGNIFICANT CHANGE UP (ref 6–8.3)
PROT UR-MCNC: ABNORMAL
RAPID RVP RESULT: SIGNIFICANT CHANGE UP
RBC # BLD: 3.43 M/UL — LOW (ref 3.8–5.2)
RBC # FLD: 16.5 % — HIGH (ref 10.3–14.5)
RBC BLD AUTO: ABNORMAL
RBC CASTS # UR COMP ASSIST: 1 /HPF — SIGNIFICANT CHANGE UP (ref 0–4)
RSV RNA SPEC QL NAA+PROBE: SIGNIFICANT CHANGE UP
RV+EV RNA SPEC QL NAA+PROBE: SIGNIFICANT CHANGE UP
SAO2 % BLDV: 33.9 % — SIGNIFICANT CHANGE UP
SARS-COV-2 RNA SPEC QL NAA+PROBE: SIGNIFICANT CHANGE UP
SODIUM SERPL-SCNC: 132 MMOL/L — LOW (ref 135–145)
SP GR SPEC: 1.03 — SIGNIFICANT CHANGE UP (ref 1.01–1.05)
UROBILINOGEN FLD QL: ABNORMAL
VARIANT LYMPHS # BLD: 2.6 % — SIGNIFICANT CHANGE UP (ref 0–6)
WBC # BLD: 3.09 K/UL — LOW (ref 3.8–10.5)
WBC # FLD AUTO: 3.09 K/UL — LOW (ref 3.8–10.5)
WBC UR QL: 17 /HPF — HIGH (ref 0–5)

## 2022-10-22 PROCEDURE — 74177 CT ABD & PELVIS W/CONTRAST: CPT | Mod: 26,MA

## 2022-10-22 PROCEDURE — 71045 X-RAY EXAM CHEST 1 VIEW: CPT | Mod: 26

## 2022-10-22 PROCEDURE — 99223 1ST HOSP IP/OBS HIGH 75: CPT

## 2022-10-22 RX ORDER — PANTOPRAZOLE SODIUM 20 MG/1
40 TABLET, DELAYED RELEASE ORAL
Refills: 0 | Status: DISCONTINUED | OUTPATIENT
Start: 2022-10-22 | End: 2022-11-01

## 2022-10-22 RX ORDER — INSULIN LISPRO 100/ML
VIAL (ML) SUBCUTANEOUS
Refills: 0 | Status: DISCONTINUED | OUTPATIENT
Start: 2022-10-22 | End: 2022-11-01

## 2022-10-22 RX ORDER — SENNA PLUS 8.6 MG/1
2 TABLET ORAL AT BEDTIME
Refills: 0 | Status: DISCONTINUED | OUTPATIENT
Start: 2022-10-22 | End: 2022-11-01

## 2022-10-22 RX ORDER — INSULIN LISPRO 100/ML
VIAL (ML) SUBCUTANEOUS AT BEDTIME
Refills: 0 | Status: DISCONTINUED | OUTPATIENT
Start: 2022-10-22 | End: 2022-11-01

## 2022-10-22 RX ORDER — GABAPENTIN 400 MG/1
300 CAPSULE ORAL
Refills: 0 | Status: DISCONTINUED | OUTPATIENT
Start: 2022-10-22 | End: 2022-11-01

## 2022-10-22 RX ORDER — ATORVASTATIN CALCIUM 80 MG/1
10 TABLET, FILM COATED ORAL AT BEDTIME
Refills: 0 | Status: DISCONTINUED | OUTPATIENT
Start: 2022-10-22 | End: 2022-10-27

## 2022-10-22 RX ORDER — POLYETHYLENE GLYCOL 3350 17 G/17G
17 POWDER, FOR SOLUTION ORAL DAILY
Refills: 0 | Status: DISCONTINUED | OUTPATIENT
Start: 2022-10-22 | End: 2022-11-01

## 2022-10-22 RX ORDER — MORPHINE SULFATE 50 MG/1
4 CAPSULE, EXTENDED RELEASE ORAL EVERY 4 HOURS
Refills: 0 | Status: DISCONTINUED | OUTPATIENT
Start: 2022-10-22 | End: 2022-10-27

## 2022-10-22 RX ORDER — NALOXONE HYDROCHLORIDE 4 MG/.1ML
0.4 SPRAY NASAL ONCE
Refills: 0 | Status: DISCONTINUED | OUTPATIENT
Start: 2022-10-22 | End: 2022-11-01

## 2022-10-22 RX ORDER — CHOLECALCIFEROL (VITAMIN D3) 125 MCG
1 CAPSULE ORAL
Qty: 0 | Refills: 0 | DISCHARGE

## 2022-10-22 RX ORDER — DEXTROSE 50 % IN WATER 50 %
25 SYRINGE (ML) INTRAVENOUS ONCE
Refills: 0 | Status: DISCONTINUED | OUTPATIENT
Start: 2022-10-22 | End: 2022-11-01

## 2022-10-22 RX ORDER — SODIUM CHLORIDE 9 MG/ML
1000 INJECTION, SOLUTION INTRAVENOUS ONCE
Refills: 0 | Status: COMPLETED | OUTPATIENT
Start: 2022-10-22 | End: 2022-10-22

## 2022-10-22 RX ORDER — SUCRALFATE 1 G
1 TABLET ORAL
Refills: 0 | Status: DISCONTINUED | OUTPATIENT
Start: 2022-10-22 | End: 2022-11-01

## 2022-10-22 RX ORDER — CHOLECALCIFEROL (VITAMIN D3) 125 MCG
5000 CAPSULE ORAL DAILY
Refills: 0 | Status: DISCONTINUED | OUTPATIENT
Start: 2022-10-22 | End: 2022-11-01

## 2022-10-22 RX ORDER — METFORMIN HYDROCHLORIDE 850 MG/1
1 TABLET ORAL
Qty: 0 | Refills: 0 | DISCHARGE

## 2022-10-22 RX ORDER — ACETAMINOPHEN 500 MG
700 TABLET ORAL ONCE
Refills: 0 | Status: COMPLETED | OUTPATIENT
Start: 2022-10-22 | End: 2022-10-22

## 2022-10-22 RX ORDER — CEFEPIME 1 G/1
1000 INJECTION, POWDER, FOR SOLUTION INTRAMUSCULAR; INTRAVENOUS EVERY 8 HOURS
Refills: 0 | Status: DISCONTINUED | OUTPATIENT
Start: 2022-10-22 | End: 2022-10-23

## 2022-10-22 RX ORDER — MONTELUKAST 4 MG/1
1 TABLET, CHEWABLE ORAL
Qty: 0 | Refills: 0 | DISCHARGE

## 2022-10-22 RX ORDER — CEFEPIME 1 G/1
1000 INJECTION, POWDER, FOR SOLUTION INTRAMUSCULAR; INTRAVENOUS ONCE
Refills: 0 | Status: COMPLETED | OUTPATIENT
Start: 2022-10-22 | End: 2022-10-22

## 2022-10-22 RX ORDER — SITAGLIPTIN 50 MG/1
1 TABLET, FILM COATED ORAL
Qty: 0 | Refills: 0 | DISCHARGE

## 2022-10-22 RX ORDER — ENOXAPARIN SODIUM 100 MG/ML
40 INJECTION SUBCUTANEOUS EVERY 24 HOURS
Refills: 0 | Status: DISCONTINUED | OUTPATIENT
Start: 2022-10-22 | End: 2022-11-01

## 2022-10-22 RX ORDER — GLUCAGON INJECTION, SOLUTION 0.5 MG/.1ML
1 INJECTION, SOLUTION SUBCUTANEOUS ONCE
Refills: 0 | Status: DISCONTINUED | OUTPATIENT
Start: 2022-10-22 | End: 2022-11-01

## 2022-10-22 RX ORDER — DEXTROSE 50 % IN WATER 50 %
15 SYRINGE (ML) INTRAVENOUS ONCE
Refills: 0 | Status: DISCONTINUED | OUTPATIENT
Start: 2022-10-22 | End: 2022-11-01

## 2022-10-22 RX ORDER — DEXTROSE 50 % IN WATER 50 %
12.5 SYRINGE (ML) INTRAVENOUS ONCE
Refills: 0 | Status: DISCONTINUED | OUTPATIENT
Start: 2022-10-22 | End: 2022-11-01

## 2022-10-22 RX ORDER — MORPHINE SULFATE 50 MG/1
8 CAPSULE, EXTENDED RELEASE ORAL ONCE
Refills: 0 | Status: DISCONTINUED | OUTPATIENT
Start: 2022-10-22 | End: 2022-10-22

## 2022-10-22 RX ORDER — VANCOMYCIN HCL 1 G
1000 VIAL (EA) INTRAVENOUS ONCE
Refills: 0 | Status: COMPLETED | OUTPATIENT
Start: 2022-10-22 | End: 2022-10-22

## 2022-10-22 RX ORDER — MORPHINE SULFATE 50 MG/1
2 CAPSULE, EXTENDED RELEASE ORAL EVERY 4 HOURS
Refills: 0 | Status: DISCONTINUED | OUTPATIENT
Start: 2022-10-22 | End: 2022-10-27

## 2022-10-22 RX ORDER — MONTELUKAST 4 MG/1
10 TABLET, CHEWABLE ORAL AT BEDTIME
Refills: 0 | Status: DISCONTINUED | OUTPATIENT
Start: 2022-10-22 | End: 2022-11-01

## 2022-10-22 RX ORDER — ACETAMINOPHEN 500 MG
650 TABLET ORAL EVERY 6 HOURS
Refills: 0 | Status: DISCONTINUED | OUTPATIENT
Start: 2022-10-22 | End: 2022-11-01

## 2022-10-22 RX ADMIN — SODIUM CHLORIDE 1000 MILLILITER(S): 9 INJECTION INTRAMUSCULAR; INTRAVENOUS; SUBCUTANEOUS at 03:05

## 2022-10-22 RX ADMIN — Medication 400 MILLIGRAM(S): at 01:40

## 2022-10-22 RX ADMIN — Medication 1 TABLET(S): at 12:03

## 2022-10-22 RX ADMIN — ATORVASTATIN CALCIUM 10 MILLIGRAM(S): 80 TABLET, FILM COATED ORAL at 21:46

## 2022-10-22 RX ADMIN — Medication 650 MILLIGRAM(S): at 10:20

## 2022-10-22 RX ADMIN — CEFEPIME 100 MILLIGRAM(S): 1 INJECTION, POWDER, FOR SOLUTION INTRAMUSCULAR; INTRAVENOUS at 21:46

## 2022-10-22 RX ADMIN — Medication 280 MILLIGRAM(S): at 16:13

## 2022-10-22 RX ADMIN — SENNA PLUS 2 TABLET(S): 8.6 TABLET ORAL at 21:45

## 2022-10-22 RX ADMIN — MORPHINE SULFATE 4 MILLIGRAM(S): 50 CAPSULE, EXTENDED RELEASE ORAL at 12:15

## 2022-10-22 RX ADMIN — Medication 1: at 12:28

## 2022-10-22 RX ADMIN — MORPHINE SULFATE 8 MILLIGRAM(S): 50 CAPSULE, EXTENDED RELEASE ORAL at 01:58

## 2022-10-22 RX ADMIN — Medication 250 MILLIGRAM(S): at 04:04

## 2022-10-22 RX ADMIN — GABAPENTIN 300 MILLIGRAM(S): 400 CAPSULE ORAL at 17:33

## 2022-10-22 RX ADMIN — CEFEPIME 100 MILLIGRAM(S): 1 INJECTION, POWDER, FOR SOLUTION INTRAMUSCULAR; INTRAVENOUS at 14:59

## 2022-10-22 RX ADMIN — Medication 1000 MILLIGRAM(S): at 03:05

## 2022-10-22 RX ADMIN — Medication 650 MILLIGRAM(S): at 09:22

## 2022-10-22 RX ADMIN — SODIUM CHLORIDE 1000 MILLILITER(S): 9 INJECTION INTRAMUSCULAR; INTRAVENOUS; SUBCUTANEOUS at 01:40

## 2022-10-22 RX ADMIN — MORPHINE SULFATE 8 MILLIGRAM(S): 50 CAPSULE, EXTENDED RELEASE ORAL at 03:05

## 2022-10-22 RX ADMIN — POLYETHYLENE GLYCOL 3350 17 GRAM(S): 17 POWDER, FOR SOLUTION ORAL at 12:03

## 2022-10-22 RX ADMIN — SODIUM CHLORIDE 1000 MILLILITER(S): 9 INJECTION, SOLUTION INTRAVENOUS at 03:21

## 2022-10-22 RX ADMIN — ENOXAPARIN SODIUM 40 MILLIGRAM(S): 100 INJECTION SUBCUTANEOUS at 12:02

## 2022-10-22 RX ADMIN — Medication 700 MILLIGRAM(S): at 22:50

## 2022-10-22 RX ADMIN — MORPHINE SULFATE 4 MILLIGRAM(S): 50 CAPSULE, EXTENDED RELEASE ORAL at 17:57

## 2022-10-22 RX ADMIN — Medication 5000 UNIT(S): at 16:14

## 2022-10-22 RX ADMIN — Medication 1 GRAM(S): at 17:33

## 2022-10-22 RX ADMIN — MORPHINE SULFATE 4 MILLIGRAM(S): 50 CAPSULE, EXTENDED RELEASE ORAL at 12:01

## 2022-10-22 RX ADMIN — MONTELUKAST 10 MILLIGRAM(S): 4 TABLET, CHEWABLE ORAL at 21:45

## 2022-10-22 RX ADMIN — Medication 700 MILLIGRAM(S): at 17:00

## 2022-10-22 RX ADMIN — Medication 280 MILLIGRAM(S): at 22:32

## 2022-10-22 RX ADMIN — CEFEPIME 100 MILLIGRAM(S): 1 INJECTION, POWDER, FOR SOLUTION INTRAMUSCULAR; INTRAVENOUS at 03:21

## 2022-10-22 RX ADMIN — MORPHINE SULFATE 4 MILLIGRAM(S): 50 CAPSULE, EXTENDED RELEASE ORAL at 17:42

## 2022-10-22 NOTE — ED PROVIDER NOTE - PHYSICAL EXAMINATION
GENERAL: no acute distress, non-toxic appearing  HEAD: normocephalic, atraumatic  HEENT: PERRLA, EOMI, normal conjunctiva, tongue pink  CARDIAC: regular rate and rhythm, no appreciable murmurs  PULM: clear to ascultation bilaterally, no crackles, rales, rhonchi, or wheezing  GI: abdomen nondistended, soft, tender diffusely  : no CVA tenderness, no suprapubic tenderness  NEURO: alert and oriented x 3, normal speech, no gross neurologic deficit  MSK: no visible deformities, no peripheral edema, calf tenderness/redness/swelling  SKIN: no visible rashes, dry, well-perfused  PSYCH: appropriate mood and affect GENERAL: no acute distress, non-toxic appearing  HEAD: normocephalic, atraumatic  HEENT: PERRLA, EOMI, normal conjunctiva, tongue pink  CARDIAC: regular rate and rhythm, no appreciable murmurs  PULM: clear to ascultation bilaterally, no crackles, rales, rhonchi, or wheezing  GI: abdomen nondistended, soft, tender diffusely, midline abdominal car well healed  : no CVA tenderness, no suprapubic tenderness  NEURO: alert and oriented x 3, normal speech, no gross neurologic deficit  MSK: no visible deformities  SKIN: no visible rashes, dry, well-perfused  PSYCH: appropriate mood and affect GENERAL: no acute distress, non-toxic appearing  HEAD: normocephalic, atraumatic  HEENT: PERRLA, EOMI, normal conjunctiva, tongue pink  CARDIAC: regular rate and rhythm, no appreciable murmurs  PULM: clear to ascultation bilaterally, no crackles, rales, rhonchi, or wheezing  GI: abdomen nondistended, soft, tender diffusely, midline abdominal car well healed  : no CVA tenderness, no suprapubic tenderness  NEURO: alert and oriented x 3, normal speech, no gross neurologic deficit  MSK: no visible deformities  SKIN: no visible rashes, dry, well-perfused  PSYCH: appropriate mood and affect  ATTENDING PHYSICAL EXAM  GEN - Lying on stretcher.  Obvious discomfort; A+O x3  HEAD - NC/AT; EYES/NOSE - PERRL, EOMI, mucous membranes moist, no discharge; THROAT: Oral cavity and pharynx normal. No inflammation, swelling, exudate, or lesions  NECK: Neck supple, non-tender without lymphadenopathy, no masses, no JVD  PULMONARY - CTA b/l, symmetric breath sounds, no w/r/r  CARDIAC -s1s2, tachy, no M,R,G  ABDOMEN - + surgical scar.  + TTP throughout.     BACK - no CVA tenderness, No vertebral or paravertebral tenderness  EXTREMITIES - symmetric pulses, 2+ dp, capillary refill < 2 seconds, no clubbing, no cyanosis, no edema   NEUROLOGIC - alert, CN 2-12 intact, no focal deficits

## 2022-10-22 NOTE — H&P ADULT - PROBLEM SELECTOR PLAN 2
- CT A/P with notable gastritis   - patient has completed 15 sessions of radiation, last session on 10/14, inflammation likely from radiation  - also endorsing odynophagia with both solids and liquids, no notable thrush or inflammation in pharynx, concern for possible radiation esophagitis   - c/s sucralafate   - pantoprazole 40mg   - will cont

## 2022-10-22 NOTE — H&P ADULT - ASSESSMENT
Ms. Lagunas is a 58 y/o F with pmhx of metastatic ampullary carcinoma s/p Whipple in 2019, Type 2 DM, HTN who presents for 4 days of abdominal pain with associated fevers and chills. CT imaging consistent with gastritis and colitis likely 2/2 to radiation treatment. Currently being treated empirically

## 2022-10-22 NOTE — H&P ADULT - PROBLEM SELECTOR PLAN 1
- Febrile to 105.1 at home with associated chills, elevated lactate to 2.1, CT A/P with notable gastritis and colitis  - ANC >500, so no concern for febrile neutropenia at this time  - patient on active chemotherapy and on radiation, with  increased risk for infection  - will treat empirically with cefepime 1g q8hrs, hold off on vanc at this time   - trend lactate to normal; s/p 2L IVF   - f/u Bcx x2 and Ucx

## 2022-10-22 NOTE — H&P ADULT - PROBLEM SELECTOR PLAN 4
- diagnosed in 2018, s/p Whipple procedure in 2019  - recent LN FNA in 7/2022 positive for metastatic disease, currently on biweekly chemotherapy Gemzar and Ambraxane; last session on 10/11   - completed 15 sessions of radiation on 10/14  - Follows with  At [facility], will email Onc  - will start with IV morphine 2mg and 4mg for moderate and severe pain, can descalate as pain improves

## 2022-10-22 NOTE — H&P ADULT - HISTORY OF PRESENT ILLNESS
Ms. Lagunas is a 56 y/o F with pmhx of metastatic ampullary carcinoma s/p Whipple in 2019, Type 2 DM, HTN who presents for 4 days of abdominal pain with associated fevers and chills.    Patient admitted back in July for YUNI opacity and received bronchoscopy. Results were negative for mets to lung but were positive fro mets to lymph nodes with pancreatobiliary primary. She currently received chemotherapy biweekly, last session on Oct 11. Currently on Gemzar and Abraxane.       In ED, Tmax 100.1. CT A/P showed possible gastritis and possible skipped colitis. Received 1 dose of vanc and cefepime. Admitted to medicine for further management.  Ms. Lagunas is a 58 y/o F with pmhx of metastatic ampullary carcinoma s/p Whipple in 2019, Type 2 DM, HTN who presents for 4 days of abdominal pain with associated fevers and chills. Daughter at bedside provide translation as patient is Faroese speaking. Patient states that sxs started last week after her chemo and radiation sessions. She endorsed upper quadrant abdominal pain that is worsened with eating foods both solids and liquids. She endorses burning sensation in the middle of her chest after eating. Denies any nausea, vomiting, hematemesis, hematochezia diarrhea . Last BM was 2 days ago. Daughter that patient has lost 2 lbs in last week, usually around 103-105 lbs. Patient also feeling more fatigued than baseline and with minimal appetite. Patient was prescribed sucralfate mixed with lidocaine and oxycodone 5mg but it only decreased pain minimally. Alos endorsing pain with swallowing, feels like a stuck feeling in her chest, " like a chest tightness"  Over the last 24 hours daughter states that Tmax at home was 105.1 orally. She called her provider at [facility] and was told to come to the ED.     Patient admitted back in July for YUNI opacity and received bronchoscopy. Results were negative for mets to lung but were positive fro mets to lymph nodes with pancreatobiliary primary. She currently received chemotherapy biweekly, last session on Oct 11. Currently on Gemzar and Abraxane. Patient has recently completed 15 back to back sessions of radiation, last session was 10/14.      In ED, Tmax 100.1. CT A/P showed possible gastritis and possible skipped colitis. Received 1 dose of vanc and cefepime. Admitted to medicine for further management.

## 2022-10-22 NOTE — H&P ADULT - NSHPSOCIALHISTORY_GEN_ALL_CORE
Lives with daughter, Daughter is CDPAP  Ambulates with cane and walker   Denies ETOH, illicit and tobacco use

## 2022-10-22 NOTE — H&P ADULT - NSHPLABSRESULTS_GEN_ALL_CORE
8.2    3.09  )-----------( 110      ( 22 Oct 2022 01:30 )             25.6       10-22    132<L>  |  99  |  7   ----------------------------<  105<H>  4.0   |  24  |  0.64    Ca    8.7      22 Oct 2022 01:55  Phos  2.0     10-22  Mg     2.00     10-22    TPro  7.1  /  Alb  3.4  /  TBili  0.9  /  DBili  x   /  AST  28  /  ALT  20  /  AlkPhos  137<H>  10-22              Urinalysis Basic - ( 22 Oct 2022 02:30 )    Color: Yellow / Appearance: Clear / S.034 / pH: x  Gluc: x / Ketone: Negative  / Bili: Negative / Urobili: 3 mg/dL   Blood: x / Protein: 30 mg/dL / Nitrite: Negative   Leuk Esterase: Negative / RBC: 1 /HPF / WBC 17 /HPF   Sq Epi: x / Non Sq Epi: 9 /HPF / Bacteria: Many        CAPILLARY BLOOD GLUCOSE      POCT Blood Glucose.: 163 mg/dL (22 Oct 2022 07:29)    < from: Xray Chest 1 View AP/PA (10.22.22 @ 00:47) >      FINDINGS:    Right chest wall MediPort withtip at the cavoatrial junction.  The heart is normal in size.  Prominent bilateral interstitial opacities. No focal consolidation.  There is no pneumothorax or pleural effusion.  No acute bony abnormalities.    IMPRESSION:  Prominent bilateral interstitial opacities may represent mild   interstitial edema. No focal consolidation.    < end of copied text >    < from: CT Abdomen and Pelvis w/ IV Cont (10.22.22 @ 01:27) >    FINDINGS:  Lungs: Dependent changes in the lung bases.    Liver: Normal. No mass.  Gallbladder and bile ducts: Cholecystectomy.  Pancreas: Pancreatic head is absent.  Spleen: Normal. No splenomegaly.  Adrenal glands: Normal. No mass.  Kidneys and ureters: Normal. No hydronephrosis.  Stomach and bowel: Stomach nondistended with possible gastric wall   thickening.  Distal gastric resection. Suggested areas of mild skipped colonic wall  thickening.  Appendix: No evidence of appendicitis.    Intraperitoneal space: Unremarkable. No free air. No significant fluid  collection.  Vasculature:  No abdominal aortic aneurysm.  Lymph nodes: Small bilateral anterior diaphragmatic nodes.  Urinary bladder: Unremarkable as visualized.  Reproductive: Unremarkable as visualized.  Bones/joints: No acute fracture.  Soft tissues: Postop changes in the abdominal wall.    IMPRESSION:  1. Status post Whipple procedure with possible gastritis and possible   skipped  colitis.  2. Small bilateral anterior diaphragmatic nodes.    < end of copied text > 8.2    3.09  )-----------( 110      ( 22 Oct 2022 01:30 )             25.6       10-22    132<L>  |  99  |  7   ----------------------------<  105<H>  4.0   |  24  |  0.64    Ca    8.7      22 Oct 2022 01:55  Phos  2.0     10-22  Mg     2.00     10-22    TPro  7.1  /  Alb  3.4  /  TBili  0.9  /  DBili  x   /  AST  28  /  ALT  20  /  AlkPhos  137<H>  10-22              Urinalysis Basic - ( 22 Oct 2022 02:30 )    Color: Yellow / Appearance: Clear / S.034 / pH: x  Gluc: x / Ketone: Negative  / Bili: Negative / Urobili: 3 mg/dL   Blood: x / Protein: 30 mg/dL / Nitrite: Negative   Leuk Esterase: Negative / RBC: 1 /HPF / WBC 17 /HPF   Sq Epi: x / Non Sq Epi: 9 /HPF / Bacteria: Many        CAPILLARY BLOOD GLUCOSE      POCT Blood Glucose.: 163 mg/dL (22 Oct 2022 07:29)    < from: Xray Chest 1 View AP/PA (10.22.22 @ 00:47) >      FINDINGS:    Right chest wall MediPort with tip at the cavoatrial junction.  The heart is normal in size.  Prominent bilateral interstitial opacities. No focal consolidation.  There is no pneumothorax or pleural effusion.  No acute bony abnormalities.    IMPRESSION:  Prominent bilateral interstitial opacities may represent mild   interstitial edema. No focal consolidation.    < end of copied text >    < from: CT Abdomen and Pelvis w/ IV Cont (10.22.22 @ 01:27) >    FINDINGS:  Lungs: Dependent changes in the lung bases.    Liver: Normal. No mass.  Gallbladder and bile ducts: Cholecystectomy.  Pancreas: Pancreatic head is absent.  Spleen: Normal. No splenomegaly.  Adrenal glands: Normal. No mass.  Kidneys and ureters: Normal. No hydronephrosis.  Stomach and bowel: Stomach nondistended with possible gastric wall   thickening.  Distal gastric resection. Suggested areas of mild skipped colonic wall  thickening.  Appendix: No evidence of appendicitis.    Intraperitoneal space: Unremarkable. No free air. No significant fluid  collection.  Vasculature:  No abdominal aortic aneurysm.  Lymph nodes: Small bilateral anterior diaphragmatic nodes.  Urinary bladder: Unremarkable as visualized.  Reproductive: Unremarkable as visualized.  Bones/joints: No acute fracture.  Soft tissues: Postop changes in the abdominal wall.    IMPRESSION:  1. Status post Whipple procedure with possible gastritis and possible   skipped  colitis.  2. Small bilateral anterior diaphragmatic nodes.    < end of copied text >

## 2022-10-22 NOTE — ED PROVIDER NOTE - PROGRESS NOTE DETAILS
Clarita Harper MD PGY2: Patient febrile, unknown source at this time. Pancytopenic, likely from chemo. Neutropenia. Patient TBA. Started on vanc and cefepime.

## 2022-10-22 NOTE — H&P ADULT - NSHPREVIEWOFSYSTEMS_GEN_ALL_CORE
CONSTITUTIONAL: No fever, no chills  EYES: No eye pain, no acute blindness  Mouth: no pain in mouth, no cuts  RESPIRATORY: No cough, No sob  CARDIOVASCULAR: No CP, no palpitations  GASTROINTESTINAL: no abdominal pain, no n/v/d  GENITOURINARY: No dysuria, no hematuria  Heme: No easy bruising, no swelling of neck  NEUROLOGICAL: No seizure, No acute paralysis  SKIN: No itching, no rashes  MUSCULOSKELETAL: No acute joint pain, no joint swelling CONSTITUTIONAL: + fever, + chills  EYES: No eye pain, no acute blindness  Mouth: no pain in mouth, no cuts  RESPIRATORY: No cough, No sob  CARDIOVASCULAR: No CP, no palpitations  GASTROINTESTINAL: + abdominal pain, no n/v/d  GENITOURINARY: No dysuria, no hematuria  Heme: No easy bruising, no swelling of neck  NEUROLOGICAL: No seizure, No acute paralysis  SKIN: No itching, no rashes  MUSCULOSKELETAL: + back pain; no joint swelling

## 2022-10-22 NOTE — ED PROVIDER NOTE - CLINICAL SUMMARY MEDICAL DECISION MAKING FREE TEXT BOX
Patient is a 57y F DM, metastatic ampullary carcinoma (s/p Whipple in 2019), HLD, lung mass (on chemo, s/p radiation), p/w 3 days fever, fatigue, worsening abdominal pain, cough. Patient on active chemo. R/o infection v neutropenic fever. CT a/p given surgical history, abdominal pain, bilary cancer history.

## 2022-10-22 NOTE — ED PROVIDER NOTE - OBJECTIVE STATEMENT
Patient is a 57y F DM, metastatic ampullary carcinoma (s/p Whipple in 2019), HLD, lung mass (on chemo, s/p radiation), p/w 3 days fever, fatigue, worsening abdominal pain, cough. Febrile at home, last tylenol >12 hours ago. Denies CP, SOB, hemoptysis, dysuria. Patient is a 57y F DM, metastatic ampullary carcinoma (s/p Whipple in 2019), HLD, lung mass (on chemo, s/p radiation), p/w 3 days fever, fatigue, worsening abdominal pain, cough. Febrile at home, last tylenol >12 hours ago. Denies CP, SOB, hemoptysis, dysuria.  Attending - Agree with above.  I evaluated patient myself. 56 y/o F speaks Frisian.  Daughter at bedside assisting with history and translation.  [place] records reviewed. Noted extensive history including ampullary CA s/p Whipple, subsequent lung CA with radiation and chemo.  c/o fever, abd pain, and right upper back pain x 3 days.  No n/v/d.  No chest pain or shortness of breath.  Has been taking oxycodone and promethazine for pain with insufficient relief.

## 2022-10-22 NOTE — H&P ADULT - NSHPPHYSICALEXAM_GEN_ALL_CORE
T(C): 36.7 (10-22-22 @ 06:02), Max: 37.8 (10-21-22 @ 20:52)  HR: 81 (10-22-22 @ 06:02) (81 - 110)  BP: 100/57 (10-22-22 @ 06:02) (100/57 - 114/65)  RR: 16 (10-22-22 @ 06:02) (16 - 25)  SpO2: 100% (10-22-22 @ 06:02) (100% - 100%)    CONSTITUTIONAL: Well groomed, no apparent distress  EYES: PERRLA and symmetric, EOMI, No conjunctival or scleral injection, non-icteric  ENMT: Oral mucosa with moist membranes. Normal dentition; no pharyngeal injection or exudates             NECK: Supple, symmetric and without tracheal deviation   RESP: No respiratory distress, no use of accessory muscles; CTA b/l, no WRR  CV: RRR, +S1S2, no MRG; no JVD; no peripheral edema  GI: Soft, NT, ND, no rebound, no guarding; no palpable masses; no hepatosplenomegaly; no hernia palpated  LYMPH: No cervical LAD or tenderness; no axillary LAD or tenderness; no inguinal LAD or tenderness  MSK: Normal gait; No digital clubbing or cyanosis; examination of the (head/neck/spine/ribs/pelvis, RUE, LUE, RLE, LLE) without misalignment,            Normal ROM without pain, no spinal tenderness, normal muscle strength/tone  SKIN: No rashes or ulcers noted; no subcutaneous nodules or induration palpable  NEURO: CN II-XII intact; normal reflexes in upper and lower extremities, sensation intact in upper and lower extremities b/l to light touch   PSYCH: Appropriate insight/judgment; A+O x 3, mood and affect appropriate, recent/remote memory intact T(C): 36.7 (10-22-22 @ 06:02), Max: 37.8 (10-21-22 @ 20:52)  HR: 81 (10-22-22 @ 06:02) (81 - 110)  BP: 100/57 (10-22-22 @ 06:02) (100/57 - 114/65)  RR: 16 (10-22-22 @ 06:02) (16 - 25)  SpO2: 100% (10-22-22 @ 06:02) (100% - 100%)    CONSTITUTIONAL: Fatigued appearing, Frail woman, appears older than age   EYES: No conjunctival or scleral injection, non-icteric  ENMT: Oral mucosa with moist membranes. Poor dentition; no pharyngeal injection or exudates; no oral thrush noted   RESP: No respiratory distress, no use of accessory muscles; CTA b/l, no WRR  CV: RRR, +S1S2, no MRG; no JVD; no peripheral edema  GI: Soft, tender diffisulrey but prominent in RUQ and LUQ, ND, no rebound, no guarding; no palpable masses;   SKIN: No rashes or ulcers noted; no subcutaneous nodules or induration palpable  NEURO: CN II-XII intact; no focal deficits, 5/5 strength in UE and LE   PSYCH: Appropriate insight/judgment; A+O x 3, mood and affect appropriate,

## 2022-10-23 LAB
ALBUMIN SERPL ELPH-MCNC: 2.7 G/DL — LOW (ref 3.3–5)
ALP SERPL-CCNC: 119 U/L — SIGNIFICANT CHANGE UP (ref 40–120)
ALT FLD-CCNC: 24 U/L — SIGNIFICANT CHANGE UP (ref 4–33)
ANION GAP SERPL CALC-SCNC: 11 MMOL/L — SIGNIFICANT CHANGE UP (ref 7–14)
AST SERPL-CCNC: 34 U/L — HIGH (ref 4–32)
BASOPHILS # BLD AUTO: 0.04 K/UL — SIGNIFICANT CHANGE UP (ref 0–0.2)
BASOPHILS NFR BLD AUTO: 0.9 % — SIGNIFICANT CHANGE UP (ref 0–2)
BILIRUB SERPL-MCNC: 0.9 MG/DL — SIGNIFICANT CHANGE UP (ref 0.2–1.2)
BUN SERPL-MCNC: 6 MG/DL — LOW (ref 7–23)
CALCIUM SERPL-MCNC: 8.3 MG/DL — LOW (ref 8.4–10.5)
CHLORIDE SERPL-SCNC: 99 MMOL/L — SIGNIFICANT CHANGE UP (ref 98–107)
CO2 SERPL-SCNC: 23 MMOL/L — SIGNIFICANT CHANGE UP (ref 22–31)
CREAT SERPL-MCNC: 0.54 MG/DL — SIGNIFICANT CHANGE UP (ref 0.5–1.3)
EGFR: 108 ML/MIN/1.73M2 — SIGNIFICANT CHANGE UP
EOSINOPHIL # BLD AUTO: 0.18 K/UL — SIGNIFICANT CHANGE UP (ref 0–0.5)
EOSINOPHIL NFR BLD AUTO: 4.2 % — SIGNIFICANT CHANGE UP (ref 0–6)
GLUCOSE BLDC GLUCOMTR-MCNC: 130 MG/DL — HIGH (ref 70–99)
GLUCOSE BLDC GLUCOMTR-MCNC: 156 MG/DL — HIGH (ref 70–99)
GLUCOSE BLDC GLUCOMTR-MCNC: 167 MG/DL — HIGH (ref 70–99)
GLUCOSE BLDC GLUCOMTR-MCNC: 210 MG/DL — HIGH (ref 70–99)
GLUCOSE SERPL-MCNC: 125 MG/DL — HIGH (ref 70–99)
HCT VFR BLD CALC: 24.4 % — LOW (ref 34.5–45)
HGB BLD-MCNC: 7.8 G/DL — LOW (ref 11.5–15.5)
IANC: 2.22 K/UL — SIGNIFICANT CHANGE UP (ref 1.8–7.4)
IMM GRANULOCYTES NFR BLD AUTO: 0.9 % — SIGNIFICANT CHANGE UP (ref 0–0.9)
LYMPHOCYTES # BLD AUTO: 0.52 K/UL — LOW (ref 1–3.3)
LYMPHOCYTES # BLD AUTO: 12.3 % — LOW (ref 13–44)
MCHC RBC-ENTMCNC: 23.9 PG — LOW (ref 27–34)
MCHC RBC-ENTMCNC: 32 GM/DL — SIGNIFICANT CHANGE UP (ref 32–36)
MCV RBC AUTO: 74.6 FL — LOW (ref 80–100)
MONOCYTES # BLD AUTO: 1.24 K/UL — HIGH (ref 0–0.9)
MONOCYTES NFR BLD AUTO: 29.2 % — HIGH (ref 2–14)
NEUTROPHILS # BLD AUTO: 2.22 K/UL — SIGNIFICANT CHANGE UP (ref 1.8–7.4)
NEUTROPHILS NFR BLD AUTO: 52.5 % — SIGNIFICANT CHANGE UP (ref 43–77)
NRBC # BLD: 0 /100 WBCS — SIGNIFICANT CHANGE UP (ref 0–0)
NRBC # FLD: 0 K/UL — SIGNIFICANT CHANGE UP (ref 0–0)
PLATELET # BLD AUTO: 115 K/UL — LOW (ref 150–400)
POTASSIUM SERPL-MCNC: 3.7 MMOL/L — SIGNIFICANT CHANGE UP (ref 3.5–5.3)
POTASSIUM SERPL-SCNC: 3.7 MMOL/L — SIGNIFICANT CHANGE UP (ref 3.5–5.3)
PROT SERPL-MCNC: 6 G/DL — SIGNIFICANT CHANGE UP (ref 6–8.3)
RBC # BLD: 3.27 M/UL — LOW (ref 3.8–5.2)
RBC # FLD: 16.8 % — HIGH (ref 10.3–14.5)
SODIUM SERPL-SCNC: 133 MMOL/L — LOW (ref 135–145)
WBC # BLD: 4.24 K/UL — SIGNIFICANT CHANGE UP (ref 3.8–10.5)
WBC # FLD AUTO: 4.24 K/UL — SIGNIFICANT CHANGE UP (ref 3.8–10.5)

## 2022-10-23 PROCEDURE — 99233 SBSQ HOSP IP/OBS HIGH 50: CPT

## 2022-10-23 RX ORDER — ACETAMINOPHEN 500 MG
700 TABLET ORAL ONCE
Refills: 0 | Status: COMPLETED | OUTPATIENT
Start: 2022-10-23 | End: 2022-10-23

## 2022-10-23 RX ORDER — PIPERACILLIN AND TAZOBACTAM 4; .5 G/20ML; G/20ML
3.38 INJECTION, POWDER, LYOPHILIZED, FOR SOLUTION INTRAVENOUS ONCE
Refills: 0 | Status: COMPLETED | OUTPATIENT
Start: 2022-10-23 | End: 2022-10-23

## 2022-10-23 RX ORDER — ONDANSETRON 8 MG/1
4 TABLET, FILM COATED ORAL EVERY 8 HOURS
Refills: 0 | Status: DISCONTINUED | OUTPATIENT
Start: 2022-10-23 | End: 2022-11-01

## 2022-10-23 RX ORDER — SODIUM CHLORIDE 9 MG/ML
1000 INJECTION, SOLUTION INTRAVENOUS
Refills: 0 | Status: DISCONTINUED | OUTPATIENT
Start: 2022-10-23 | End: 2022-10-25

## 2022-10-23 RX ORDER — ACETAMINOPHEN 500 MG
650 TABLET ORAL ONCE
Refills: 0 | Status: COMPLETED | OUTPATIENT
Start: 2022-10-23 | End: 2022-10-23

## 2022-10-23 RX ORDER — PIPERACILLIN AND TAZOBACTAM 4; .5 G/20ML; G/20ML
3.38 INJECTION, POWDER, LYOPHILIZED, FOR SOLUTION INTRAVENOUS EVERY 8 HOURS
Refills: 0 | Status: DISCONTINUED | OUTPATIENT
Start: 2022-10-23 | End: 2022-10-25

## 2022-10-23 RX ADMIN — PIPERACILLIN AND TAZOBACTAM 25 GRAM(S): 4; .5 INJECTION, POWDER, LYOPHILIZED, FOR SOLUTION INTRAVENOUS at 23:24

## 2022-10-23 RX ADMIN — GABAPENTIN 300 MILLIGRAM(S): 400 CAPSULE ORAL at 17:25

## 2022-10-23 RX ADMIN — Medication 650 MILLIGRAM(S): at 12:09

## 2022-10-23 RX ADMIN — PIPERACILLIN AND TAZOBACTAM 25 GRAM(S): 4; .5 INJECTION, POWDER, LYOPHILIZED, FOR SOLUTION INTRAVENOUS at 12:35

## 2022-10-23 RX ADMIN — CEFEPIME 100 MILLIGRAM(S): 1 INJECTION, POWDER, FOR SOLUTION INTRAMUSCULAR; INTRAVENOUS at 05:59

## 2022-10-23 RX ADMIN — Medication 2: at 17:24

## 2022-10-23 RX ADMIN — MONTELUKAST 10 MILLIGRAM(S): 4 TABLET, CHEWABLE ORAL at 21:47

## 2022-10-23 RX ADMIN — ENOXAPARIN SODIUM 40 MILLIGRAM(S): 100 INJECTION SUBCUTANEOUS at 12:12

## 2022-10-23 RX ADMIN — POLYETHYLENE GLYCOL 3350 17 GRAM(S): 17 POWDER, FOR SOLUTION ORAL at 12:12

## 2022-10-23 RX ADMIN — ONDANSETRON 4 MILLIGRAM(S): 8 TABLET, FILM COATED ORAL at 13:43

## 2022-10-23 RX ADMIN — Medication 1 GRAM(S): at 05:57

## 2022-10-23 RX ADMIN — GABAPENTIN 300 MILLIGRAM(S): 400 CAPSULE ORAL at 05:59

## 2022-10-23 RX ADMIN — Medication 1: at 12:35

## 2022-10-23 RX ADMIN — Medication 5000 UNIT(S): at 12:15

## 2022-10-23 RX ADMIN — ONDANSETRON 4 MILLIGRAM(S): 8 TABLET, FILM COATED ORAL at 23:06

## 2022-10-23 RX ADMIN — SENNA PLUS 2 TABLET(S): 8.6 TABLET ORAL at 21:47

## 2022-10-23 RX ADMIN — Medication 1 GRAM(S): at 17:25

## 2022-10-23 RX ADMIN — PANTOPRAZOLE SODIUM 40 MILLIGRAM(S): 20 TABLET, DELAYED RELEASE ORAL at 05:58

## 2022-10-23 RX ADMIN — PIPERACILLIN AND TAZOBACTAM 200 GRAM(S): 4; .5 INJECTION, POWDER, LYOPHILIZED, FOR SOLUTION INTRAVENOUS at 09:39

## 2022-10-23 RX ADMIN — Medication 280 MILLIGRAM(S): at 20:05

## 2022-10-23 RX ADMIN — Medication 700 MILLIGRAM(S): at 20:55

## 2022-10-23 RX ADMIN — SODIUM CHLORIDE 100 MILLILITER(S): 9 INJECTION, SOLUTION INTRAVENOUS at 11:52

## 2022-10-23 RX ADMIN — ATORVASTATIN CALCIUM 10 MILLIGRAM(S): 80 TABLET, FILM COATED ORAL at 21:48

## 2022-10-23 RX ADMIN — Medication 1 TABLET(S): at 12:11

## 2022-10-23 NOTE — PROGRESS NOTE ADULT - PROBLEM SELECTOR PLAN 2
- CT A/P with notable gastritis   - patient has completed 15 sessions of radiation, last session on 10/14, inflammation likely from radiation  - also endorsing odynophagia with both solids and liquids, no notable thrush or inflammation in pharynx, concern for possible radiation esophagitis   - c/s sucralafate   - pantoprazole 40mg   - - CT A/P with notable gastritis   - patient has completed 15 sessions of radiation, last session on 10/14, inflammation likely from radiation  - also endorsing odynophagia with both solids and liquids, no notable thrush or inflammation in pharynx, concern for possible radiation esophagitis   - c/s sucralafate   - pantoprazole 40mg     # Acute on chronic anemia  No evidence of GIB. Baseline Hgb around 9 based on review of prior labs.   - F/u iron studies, hapto, b12, folate [ ]  - Trend H/H  - Maintain active t/s - CT A/P with notable gastritis   - patient has completed 15 sessions of radiation, last session on 10/14, inflammation likely from radiation  - also endorsing odynophagia with both solids and liquids, no notable thrush or inflammation in pharynx, concern for possible radiation esophagitis   - c/s sucralafate   - pantoprazole 40mg     # Acute on chronic anemia  No evidence of GIB. Baseline Hgb around 9 based on review of prior labs. She received 2L IVF on admission, likely dilutional component driving down hgb.  - F/u iron studies, hapto, b12, folate [ ]  - Trend H/H  - Maintain active t/s

## 2022-10-23 NOTE — PROGRESS NOTE ADULT - SUBJECTIVE AND OBJECTIVE BOX
[facility] Division of Hospital Medicine  Britta Raymond MD  Pager (M-F, 8A-5P): [phone]  Other Times:  [phone]      SUBJECTIVE / OVERNIGHT EVENTS: Pt says "hot!" Called daughter who was able to translate via [employer], says her mom feels hot, ongoing decr oral intake. Daughter says these symptoms have been going on for 14 days.    MEDICATIONS  (STANDING):  atorvastatin 10 milliGRAM(s) Oral at bedtime  cholecalciferol 5000 Unit(s) Oral daily  dextrose 50% Injectable 25 Gram(s) IV Push once  dextrose 50% Injectable 12.5 Gram(s) IV Push once  dextrose 50% Injectable 25 Gram(s) IV Push once  dextrose Oral Gel 15 Gram(s) Oral once  enoxaparin Injectable 40 milliGRAM(s) SubCutaneous every 24 hours  gabapentin 300 milliGRAM(s) Oral two times a day  glucagon  Injectable 1 milliGRAM(s) IntraMuscular once  insulin lispro (ADMELOG) corrective regimen sliding scale   SubCutaneous three times a day before meals  insulin lispro (ADMELOG) corrective regimen sliding scale   SubCutaneous at bedtime  lactated ringers. 1000 milliLiter(s) (100 mL/Hr) IV Continuous <Continuous>  montelukast 10 milliGRAM(s) Oral at bedtime  multivitamin 1 Tablet(s) Oral daily  naloxone Injectable 0.4 milliGRAM(s) IV Push once  ondansetron   Disintegrating Tablet 4 milliGRAM(s) Oral every 8 hours  pantoprazole   Suspension 40 milliGRAM(s) Oral before breakfast  piperacillin/tazobactam IVPB.. 3.375 Gram(s) IV Intermittent every 8 hours  polyethylene glycol 3350 17 Gram(s) Oral daily  senna 2 Tablet(s) Oral at bedtime  sucralfate 1 Gram(s) Oral two times a day    MEDICATIONS  (PRN):  acetaminophen     Tablet .. 650 milliGRAM(s) Oral every 6 hours PRN Temp greater or equal to 38C (100.4F), Mild Pain (1 - 3)  bisacodyl 5 milliGRAM(s) Oral daily PRN Constipation  morphine  - Injectable 2 milliGRAM(s) IV Push every 4 hours PRN Moderate Pain (4 - 6)  morphine  - Injectable 4 milliGRAM(s) IV Push every 4 hours PRN Severe Pain (7 - 10)      I&O's Summary      PHYSICAL EXAM:  Vital Signs Last 24 Hrs  T(C): 37.4 (23 Oct 2022 12:48), Max: 39.3 (22 Oct 2022 15:21)  T(F): 99.4 (23 Oct 2022 12:48), Max: 102.7 (22 Oct 2022 15:21)  HR: 95 (23 Oct 2022 12:48) (65 - 95)  BP: 110/60 (23 Oct 2022 12:48) (100/57 - 112/67)  BP(mean): --  RR: 18 (23 Oct 2022 12:48) (18 - 18)  SpO2: 98% (23 Oct 2022 12:48) (97% - 100%)    Parameters below as of 23 Oct 2022 12:48  Patient On (Oxygen Delivery Method): room air      CONSTITUTIONAL: thin, ill appearing  EYES: PERRLA; conjunctiva and sclera clear  ENMT: Moist oral mucosa, no pharyngeal injection or exudate  RESPIRATORY: Normal respiratory effort; lungs are clear to auscultation bilaterally  CARDIOVASCULAR: Regular rate and rhythm, normal S1 and S2, no murmur/rub/gallop; No lower extremity edema; Peripheral pulses are 2+ bilaterally  ABDOMEN: Mildly tender to palpation, normoactive bowel sounds, no rebound/guarding; No hepatosplenomegaly  PSYCH: A+O to person, place, anxious  SKIN: No rashes; no palpable lesions    LABS:                        7.8    4.24  )-----------( 115      ( 23 Oct 2022 07:20 )             24.4     10-23    133<L>  |  99  |  6<L>  ----------------------------<  125<H>  3.7   |  23  |  0.54    Ca    8.3<L>      23 Oct 2022 07:20  Phos  2.0     10-22  Mg     2.00     10-22    TPro  6.0  /  Alb  2.7<L>  /  TBili  0.9  /  DBili  x   /  AST  34<H>  /  ALT  24  /  AlkPhos  119  10-23          Urinalysis Basic - ( 22 Oct 2022 02:30 )    Color: Yellow / Appearance: Clear / S.034 / pH: x  Gluc: x / Ketone: Negative  / Bili: Negative / Urobili: 3 mg/dL   Blood: x / Protein: 30 mg/dL / Nitrite: Negative   Leuk Esterase: Negative / RBC: 1 /HPF / WBC 17 /HPF   Sq Epi: x / Non Sq Epi: 9 /HPF / Bacteria: Many        Culture - Urine (collected 22 Oct 2022 02:28)  Source: Clean Catch Clean Catch (Midstream)  Preliminary Report (23 Oct 2022 10:45):    >100,000 CFU/ml Escherichia coli        RADIOLOGY & ADDITIONAL TESTS:  Results Reviewed:   Imaging Personally Reviewed:  Electrocardiogram Personally Reviewed:    COORDINATION OF CARE:  Care Discussed with Consultants/Other Providers [Y/N]:  Prior or Outpatient Records Reviewed [Y/N]:   [facility] Division of Hospital Medicine  Britta Raymond MD  Pager (M-F, 8A-5P): [phone]  Other Times:  [phone]      SUBJECTIVE / OVERNIGHT EVENTS: Pt says "hot!" Called daughter who was able to translate via [employer], says her mom feels hot, ongoing decr oral intake. Daughter says these symptoms have been going on for 14 days. Denies melena.    MEDICATIONS  (STANDING):  atorvastatin 10 milliGRAM(s) Oral at bedtime  cholecalciferol 5000 Unit(s) Oral daily  dextrose 50% Injectable 25 Gram(s) IV Push once  dextrose 50% Injectable 12.5 Gram(s) IV Push once  dextrose 50% Injectable 25 Gram(s) IV Push once  dextrose Oral Gel 15 Gram(s) Oral once  enoxaparin Injectable 40 milliGRAM(s) SubCutaneous every 24 hours  gabapentin 300 milliGRAM(s) Oral two times a day  glucagon  Injectable 1 milliGRAM(s) IntraMuscular once  insulin lispro (ADMELOG) corrective regimen sliding scale   SubCutaneous three times a day before meals  insulin lispro (ADMELOG) corrective regimen sliding scale   SubCutaneous at bedtime  lactated ringers. 1000 milliLiter(s) (100 mL/Hr) IV Continuous <Continuous>  montelukast 10 milliGRAM(s) Oral at bedtime  multivitamin 1 Tablet(s) Oral daily  naloxone Injectable 0.4 milliGRAM(s) IV Push once  ondansetron   Disintegrating Tablet 4 milliGRAM(s) Oral every 8 hours  pantoprazole   Suspension 40 milliGRAM(s) Oral before breakfast  piperacillin/tazobactam IVPB.. 3.375 Gram(s) IV Intermittent every 8 hours  polyethylene glycol 3350 17 Gram(s) Oral daily  senna 2 Tablet(s) Oral at bedtime  sucralfate 1 Gram(s) Oral two times a day    MEDICATIONS  (PRN):  acetaminophen     Tablet .. 650 milliGRAM(s) Oral every 6 hours PRN Temp greater or equal to 38C (100.4F), Mild Pain (1 - 3)  bisacodyl 5 milliGRAM(s) Oral daily PRN Constipation  morphine  - Injectable 2 milliGRAM(s) IV Push every 4 hours PRN Moderate Pain (4 - 6)  morphine  - Injectable 4 milliGRAM(s) IV Push every 4 hours PRN Severe Pain (7 - 10)      I&O's Summary      PHYSICAL EXAM:  Vital Signs Last 24 Hrs  T(C): 37.4 (23 Oct 2022 12:48), Max: 39.3 (22 Oct 2022 15:21)  T(F): 99.4 (23 Oct 2022 12:48), Max: 102.7 (22 Oct 2022 15:21)  HR: 95 (23 Oct 2022 12:48) (65 - 95)  BP: 110/60 (23 Oct 2022 12:48) (100/57 - 112/67)  BP(mean): --  RR: 18 (23 Oct 2022 12:48) (18 - 18)  SpO2: 98% (23 Oct 2022 12:48) (97% - 100%)    Parameters below as of 23 Oct 2022 12:48  Patient On (Oxygen Delivery Method): room air      CONSTITUTIONAL: thin, ill appearing  EYES: PERRLA; conjunctiva and sclera clear  ENMT: Moist oral mucosa, no pharyngeal injection or exudate  RESPIRATORY: Normal respiratory effort; lungs are clear to auscultation bilaterally  CARDIOVASCULAR: Regular rate and rhythm, normal S1 and S2, no murmur/rub/gallop; No lower extremity edema; Peripheral pulses are 2+ bilaterally  ABDOMEN: Mildly tender to palpation, normoactive bowel sounds, no rebound/guarding; No hepatosplenomegaly  PSYCH: A+O to person, place, anxious  SKIN: No rashes; no palpable lesions    LABS:                        7.8    4.24  )-----------( 115      ( 23 Oct 2022 07:20 )             24.4     10-23    133<L>  |  99  |  6<L>  ----------------------------<  125<H>  3.7   |  23  |  0.54    Ca    8.3<L>      23 Oct 2022 07:20  Phos  2.0     10-22  Mg     2.00     10-22    TPro  6.0  /  Alb  2.7<L>  /  TBili  0.9  /  DBili  x   /  AST  34<H>  /  ALT  24  /  AlkPhos  119  10-23          Urinalysis Basic - ( 22 Oct 2022 02:30 )    Color: Yellow / Appearance: Clear / S.034 / pH: x  Gluc: x / Ketone: Negative  / Bili: Negative / Urobili: 3 mg/dL   Blood: x / Protein: 30 mg/dL / Nitrite: Negative   Leuk Esterase: Negative / RBC: 1 /HPF / WBC 17 /HPF   Sq Epi: x / Non Sq Epi: 9 /HPF / Bacteria: Many        Culture - Urine (collected 22 Oct 2022 02:28)  Source: Clean Catch Clean Catch (Midstream)  Preliminary Report (23 Oct 2022 10:45):    >100,000 CFU/ml Escherichia coli        RADIOLOGY & ADDITIONAL TESTS:  Results Reviewed:   Imaging Personally Reviewed:  Electrocardiogram Personally Reviewed:    COORDINATION OF CARE:  Care Discussed with Consultants/Other Providers [Y/N]:  Prior or Outpatient Records Reviewed [Y/N]:

## 2022-10-23 NOTE — PROGRESS NOTE ADULT - PROBLEM SELECTOR PLAN 1
2/2 UTI and colitis  - Febrile to 105.1 at home with associated chills, elevated lactate to 2.1, CT A/P with notable gastritis and colitis  - ANC >500, so no concern for febrile neutropenia at this time  - patient on active chemotherapy and on radiation, with  increased risk for infection  - cefepime 10/22--> transitioned to zosyn (10/23- ) for anaerobic cvg, f/u culture data  - trend lactate to normal; s/p 2L IVF   - f/u Bcx x2 and Ucx -- > e coli in urine    # UTI, POA  - cefepime 10/22--> zosyn 10/23 for anaerobic cvg w colitis as above. F/u sensitivities [ ]

## 2022-10-24 DIAGNOSIS — R13.10 DYSPHAGIA, UNSPECIFIED: ICD-10-CM

## 2022-10-24 LAB
-  AMIKACIN: SIGNIFICANT CHANGE UP
-  AMOXICILLIN/CLAVULANIC ACID: SIGNIFICANT CHANGE UP
-  AMPICILLIN/SULBACTAM: SIGNIFICANT CHANGE UP
-  AMPICILLIN: SIGNIFICANT CHANGE UP
-  AZTREONAM: SIGNIFICANT CHANGE UP
-  CEFAZOLIN: SIGNIFICANT CHANGE UP
-  CEFEPIME: SIGNIFICANT CHANGE UP
-  CEFTRIAXONE: SIGNIFICANT CHANGE UP
-  CIPROFLOXACIN: SIGNIFICANT CHANGE UP
-  ERTAPENEM: SIGNIFICANT CHANGE UP
-  GENTAMICIN: SIGNIFICANT CHANGE UP
-  IMIPENEM: SIGNIFICANT CHANGE UP
-  LEVOFLOXACIN: SIGNIFICANT CHANGE UP
-  MEROPENEM: SIGNIFICANT CHANGE UP
-  NITROFURANTOIN: SIGNIFICANT CHANGE UP
-  PIPERACILLIN/TAZOBACTAM: SIGNIFICANT CHANGE UP
-  TIGECYCLINE: SIGNIFICANT CHANGE UP
-  TOBRAMYCIN: SIGNIFICANT CHANGE UP
-  TRIMETHOPRIM/SULFAMETHOXAZOLE: SIGNIFICANT CHANGE UP
CULTURE RESULTS: SIGNIFICANT CHANGE UP
GLUCOSE BLDC GLUCOMTR-MCNC: 100 MG/DL — HIGH (ref 70–99)
GLUCOSE BLDC GLUCOMTR-MCNC: 150 MG/DL — HIGH (ref 70–99)
GLUCOSE BLDC GLUCOMTR-MCNC: 155 MG/DL — HIGH (ref 70–99)
GLUCOSE BLDC GLUCOMTR-MCNC: 211 MG/DL — HIGH (ref 70–99)
LACTATE SERPL-SCNC: 1.9 MMOL/L — SIGNIFICANT CHANGE UP (ref 0.5–2)
METHOD TYPE: SIGNIFICANT CHANGE UP
ORGANISM # SPEC MICROSCOPIC CNT: SIGNIFICANT CHANGE UP
ORGANISM # SPEC MICROSCOPIC CNT: SIGNIFICANT CHANGE UP
SPECIMEN SOURCE: SIGNIFICANT CHANGE UP

## 2022-10-24 PROCEDURE — 99233 SBSQ HOSP IP/OBS HIGH 50: CPT

## 2022-10-24 RX ORDER — ACETAMINOPHEN 500 MG
700 TABLET ORAL ONCE
Refills: 0 | Status: COMPLETED | OUTPATIENT
Start: 2022-10-24 | End: 2022-10-24

## 2022-10-24 RX ADMIN — ATORVASTATIN CALCIUM 10 MILLIGRAM(S): 80 TABLET, FILM COATED ORAL at 21:38

## 2022-10-24 RX ADMIN — Medication 100 MILLIGRAM(S): at 20:38

## 2022-10-24 RX ADMIN — PIPERACILLIN AND TAZOBACTAM 25 GRAM(S): 4; .5 INJECTION, POWDER, LYOPHILIZED, FOR SOLUTION INTRAVENOUS at 13:54

## 2022-10-24 RX ADMIN — SENNA PLUS 2 TABLET(S): 8.6 TABLET ORAL at 21:37

## 2022-10-24 RX ADMIN — Medication 1 GRAM(S): at 05:38

## 2022-10-24 RX ADMIN — GABAPENTIN 300 MILLIGRAM(S): 400 CAPSULE ORAL at 17:28

## 2022-10-24 RX ADMIN — ONDANSETRON 4 MILLIGRAM(S): 8 TABLET, FILM COATED ORAL at 05:39

## 2022-10-24 RX ADMIN — Medication 100 MILLIGRAM(S): at 18:11

## 2022-10-24 RX ADMIN — Medication 5000 UNIT(S): at 12:28

## 2022-10-24 RX ADMIN — ONDANSETRON 4 MILLIGRAM(S): 8 TABLET, FILM COATED ORAL at 14:03

## 2022-10-24 RX ADMIN — PANTOPRAZOLE SODIUM 40 MILLIGRAM(S): 20 TABLET, DELAYED RELEASE ORAL at 05:38

## 2022-10-24 RX ADMIN — Medication 100 MILLIGRAM(S): at 02:34

## 2022-10-24 RX ADMIN — Medication 2: at 17:27

## 2022-10-24 RX ADMIN — ENOXAPARIN SODIUM 40 MILLIGRAM(S): 100 INJECTION SUBCUTANEOUS at 12:28

## 2022-10-24 RX ADMIN — Medication 700 MILLIGRAM(S): at 20:50

## 2022-10-24 RX ADMIN — PIPERACILLIN AND TAZOBACTAM 25 GRAM(S): 4; .5 INJECTION, POWDER, LYOPHILIZED, FOR SOLUTION INTRAVENOUS at 05:38

## 2022-10-24 RX ADMIN — ONDANSETRON 4 MILLIGRAM(S): 8 TABLET, FILM COATED ORAL at 21:35

## 2022-10-24 RX ADMIN — Medication 1: at 12:29

## 2022-10-24 RX ADMIN — MONTELUKAST 10 MILLIGRAM(S): 4 TABLET, CHEWABLE ORAL at 21:38

## 2022-10-24 RX ADMIN — Medication 280 MILLIGRAM(S): at 20:03

## 2022-10-24 RX ADMIN — PIPERACILLIN AND TAZOBACTAM 25 GRAM(S): 4; .5 INJECTION, POWDER, LYOPHILIZED, FOR SOLUTION INTRAVENOUS at 21:35

## 2022-10-24 RX ADMIN — Medication 1 GRAM(S): at 17:29

## 2022-10-24 RX ADMIN — Medication 1 TABLET(S): at 12:28

## 2022-10-24 RX ADMIN — GABAPENTIN 300 MILLIGRAM(S): 400 CAPSULE ORAL at 05:38

## 2022-10-24 NOTE — PROGRESS NOTE ADULT - PROBLEM SELECTOR PLAN 2
Odynophagia with dysphagia to solids and liquids 2/2 pain. Etiology likely 2/2 radiation esophagitis and less likely candida esophagitis (no oral lesions), will send w/u to rule out viral etiologies in the setting of an immunocompromised state (chemo/malignancy). Tylenol does not help to relieve the pain. Morphine 4mg IV made her dizzy.  - Will attempt to use morphine 2mg IV to relieve the pain   - S&S consult   - Soft diet for now   - Viral work up   - Consider GI consult

## 2022-10-24 NOTE — PROGRESS NOTE ADULT - SUBJECTIVE AND OBJECTIVE BOX
Hospitalist Progress Note  Aimee Barraza MD Pager # [phone]    OVERNIGHT EVENTS: JAILYN    SUBJECTIVE / INTERVAL HPI: Patient seen and examined at bedside. Pt. reports that she is continuing to have pain with swallowing. Tylenol does not help to relieve the pain. Her daughter is at bedside and says she is coughing when she is eating. She denies nausea/vomiting. She had diarrhea for a few days before coming to the hospital but this has resolved. She was constipated but now she is having normal BMs. She denies dysuria or hematuria. Denies frequent urination. All other ROS negative.     VITAL SIGNS:  Vital Signs Last 24 Hrs  T(C): 37.4 (24 Oct 2022 05:35), Max: 39 (23 Oct 2022 19:49)  T(F): 99.4 (24 Oct 2022 05:35), Max: 102.2 (23 Oct 2022 19:49)  HR: 98 (24 Oct 2022 05:35) (98 - 106)  BP: 103/50 (24 Oct 2022 05:35) (103/50 - 130/62)  BP(mean): --  RR: 18 (24 Oct 2022 05:35) (18 - 18)  SpO2: 98% (24 Oct 2022 05:35) (98% - 99%)    Parameters below as of 24 Oct 2022 05:35  Patient On (Oxygen Delivery Method): room air        PHYSICAL EXAM:    General: Ill appearing female resting in bed   HEENT: NC/AT; PERRL, anicteric sclera; MMM - oropharyngeal erythema and no oral plaques or ulcerations.   Neck: supple  Cardiovascular: +S1/S2; RRR  Respiratory: CTA B/L; no W/R/R  Gastrointestinal: soft, NT/ND; +BSx4  Extremities: WWP; no edema, clubbing or cyanosis  Vascular: 2+ radial, DP/PT pulses B/L  Neurological: AAOx3; no focal deficits    MEDICATIONS:  MEDICATIONS  (STANDING):  atorvastatin 10 milliGRAM(s) Oral at bedtime  cholecalciferol 5000 Unit(s) Oral daily  dextrose 50% Injectable 25 Gram(s) IV Push once  dextrose 50% Injectable 12.5 Gram(s) IV Push once  dextrose 50% Injectable 25 Gram(s) IV Push once  dextrose Oral Gel 15 Gram(s) Oral once  enoxaparin Injectable 40 milliGRAM(s) SubCutaneous every 24 hours  gabapentin 300 milliGRAM(s) Oral two times a day  glucagon  Injectable 1 milliGRAM(s) IntraMuscular once  insulin lispro (ADMELOG) corrective regimen sliding scale   SubCutaneous three times a day before meals  insulin lispro (ADMELOG) corrective regimen sliding scale   SubCutaneous at bedtime  lactated ringers. 1000 milliLiter(s) (100 mL/Hr) IV Continuous <Continuous>  montelukast 10 milliGRAM(s) Oral at bedtime  multivitamin 1 Tablet(s) Oral daily  naloxone Injectable 0.4 milliGRAM(s) IV Push once  ondansetron   Disintegrating Tablet 4 milliGRAM(s) Oral every 8 hours  pantoprazole   Suspension 40 milliGRAM(s) Oral before breakfast  piperacillin/tazobactam IVPB.. 3.375 Gram(s) IV Intermittent every 8 hours  polyethylene glycol 3350 17 Gram(s) Oral daily  senna 2 Tablet(s) Oral at bedtime  sucralfate 1 Gram(s) Oral two times a day    MEDICATIONS  (PRN):  acetaminophen     Tablet .. 650 milliGRAM(s) Oral every 6 hours PRN Temp greater or equal to 38C (100.4F), Mild Pain (1 - 3)  bisacodyl 5 milliGRAM(s) Oral daily PRN Constipation  guaiFENesin Oral Liquid (Sugar-Free) 100 milliGRAM(s) Oral every 6 hours PRN Cough  morphine  - Injectable 2 milliGRAM(s) IV Push every 4 hours PRN Moderate Pain (4 - 6)  morphine  - Injectable 4 milliGRAM(s) IV Push every 4 hours PRN Severe Pain (7 - 10)      ALLERGIES:  Allergies    No Known Allergies    Intolerances        LABS:                        7.8    4.24  )-----------( 115      ( 23 Oct 2022 07:20 )             24.4     10-23    133<L>  |  99  |  6<L>  ----------------------------<  125<H>  3.7   |  23  |  0.54    Ca    8.3<L>      23 Oct 2022 07:20    TPro  6.0  /  Alb  2.7<L>  /  TBili  0.9  /  DBili  x   /  AST  34<H>  /  ALT  24  /  AlkPhos  119  10-23        CAPILLARY BLOOD GLUCOSE      POCT Blood Glucose.: 155 mg/dL (24 Oct 2022 11:53)      RADIOLOGY & ADDITIONAL TESTS: Reviewed.    ASSESSMENT:    PLAN:

## 2022-10-25 ENCOUNTER — APPOINTMENT (OUTPATIENT)
Dept: INFUSION THERAPY | Facility: HOSPITAL | Age: 57
End: 2022-10-25

## 2022-10-25 LAB
ANION GAP SERPL CALC-SCNC: 12 MMOL/L — SIGNIFICANT CHANGE UP (ref 7–14)
B PERT DNA SPEC QL NAA+PROBE: SIGNIFICANT CHANGE UP
B PERT+PARAPERT DNA PNL SPEC NAA+PROBE: SIGNIFICANT CHANGE UP
BORDETELLA PARAPERTUSSIS (RAPRVP): SIGNIFICANT CHANGE UP
BUN SERPL-MCNC: 5 MG/DL — LOW (ref 7–23)
C PNEUM DNA SPEC QL NAA+PROBE: SIGNIFICANT CHANGE UP
CALCIUM SERPL-MCNC: 8.1 MG/DL — LOW (ref 8.4–10.5)
CHLORIDE SERPL-SCNC: 103 MMOL/L — SIGNIFICANT CHANGE UP (ref 98–107)
CO2 SERPL-SCNC: 22 MMOL/L — SIGNIFICANT CHANGE UP (ref 22–31)
CREAT SERPL-MCNC: 0.56 MG/DL — SIGNIFICANT CHANGE UP (ref 0.5–1.3)
EGFR: 107 ML/MIN/1.73M2 — SIGNIFICANT CHANGE UP
FLUAV SUBTYP SPEC NAA+PROBE: SIGNIFICANT CHANGE UP
FLUBV RNA SPEC QL NAA+PROBE: SIGNIFICANT CHANGE UP
GLUCOSE BLDC GLUCOMTR-MCNC: 138 MG/DL — HIGH (ref 70–99)
GLUCOSE BLDC GLUCOMTR-MCNC: 139 MG/DL — HIGH (ref 70–99)
GLUCOSE BLDC GLUCOMTR-MCNC: 157 MG/DL — HIGH (ref 70–99)
GLUCOSE BLDC GLUCOMTR-MCNC: 277 MG/DL — HIGH (ref 70–99)
GLUCOSE SERPL-MCNC: 232 MG/DL — HIGH (ref 70–99)
HADV DNA SPEC QL NAA+PROBE: SIGNIFICANT CHANGE UP
HCOV 229E RNA SPEC QL NAA+PROBE: SIGNIFICANT CHANGE UP
HCOV HKU1 RNA SPEC QL NAA+PROBE: SIGNIFICANT CHANGE UP
HCOV NL63 RNA SPEC QL NAA+PROBE: SIGNIFICANT CHANGE UP
HCOV OC43 RNA SPEC QL NAA+PROBE: SIGNIFICANT CHANGE UP
HCT VFR BLD CALC: 25.7 % — LOW (ref 34.5–45)
HGB BLD-MCNC: 8.1 G/DL — LOW (ref 11.5–15.5)
HMPV RNA SPEC QL NAA+PROBE: SIGNIFICANT CHANGE UP
HPIV1 RNA SPEC QL NAA+PROBE: SIGNIFICANT CHANGE UP
HPIV2 RNA SPEC QL NAA+PROBE: SIGNIFICANT CHANGE UP
HPIV3 RNA SPEC QL NAA+PROBE: SIGNIFICANT CHANGE UP
HPIV4 RNA SPEC QL NAA+PROBE: SIGNIFICANT CHANGE UP
M PNEUMO DNA SPEC QL NAA+PROBE: SIGNIFICANT CHANGE UP
MAGNESIUM SERPL-MCNC: 2 MG/DL — SIGNIFICANT CHANGE UP (ref 1.6–2.6)
MCHC RBC-ENTMCNC: 23.7 PG — LOW (ref 27–34)
MCHC RBC-ENTMCNC: 31.5 GM/DL — LOW (ref 32–36)
MCV RBC AUTO: 75.1 FL — LOW (ref 80–100)
NRBC # BLD: 0 /100 WBCS — SIGNIFICANT CHANGE UP (ref 0–0)
NRBC # FLD: 0 K/UL — SIGNIFICANT CHANGE UP (ref 0–0)
PHOSPHATE SERPL-MCNC: 2.2 MG/DL — LOW (ref 2.5–4.5)
PLATELET # BLD AUTO: 159 K/UL — SIGNIFICANT CHANGE UP (ref 150–400)
POTASSIUM SERPL-MCNC: 3.2 MMOL/L — LOW (ref 3.5–5.3)
POTASSIUM SERPL-SCNC: 3.2 MMOL/L — LOW (ref 3.5–5.3)
PROCALCITONIN SERPL-MCNC: 9.19 NG/ML — HIGH (ref 0.02–0.1)
RAPID RVP RESULT: SIGNIFICANT CHANGE UP
RBC # BLD: 3.42 M/UL — LOW (ref 3.8–5.2)
RBC # FLD: 17.1 % — HIGH (ref 10.3–14.5)
RSV RNA SPEC QL NAA+PROBE: SIGNIFICANT CHANGE UP
RV+EV RNA SPEC QL NAA+PROBE: SIGNIFICANT CHANGE UP
SARS-COV-2 RNA SPEC QL NAA+PROBE: SIGNIFICANT CHANGE UP
SODIUM SERPL-SCNC: 137 MMOL/L — SIGNIFICANT CHANGE UP (ref 135–145)
WBC # BLD: 4.87 K/UL — SIGNIFICANT CHANGE UP (ref 3.8–10.5)
WBC # FLD AUTO: 4.87 K/UL — SIGNIFICANT CHANGE UP (ref 3.8–10.5)

## 2022-10-25 PROCEDURE — 71260 CT THORAX DX C+: CPT | Mod: 26

## 2022-10-25 PROCEDURE — 99233 SBSQ HOSP IP/OBS HIGH 50: CPT

## 2022-10-25 PROCEDURE — 99222 1ST HOSP IP/OBS MODERATE 55: CPT | Mod: GC

## 2022-10-25 PROCEDURE — 71045 X-RAY EXAM CHEST 1 VIEW: CPT | Mod: 26

## 2022-10-25 RX ORDER — SODIUM CHLORIDE 9 MG/ML
1000 INJECTION INTRAMUSCULAR; INTRAVENOUS; SUBCUTANEOUS
Refills: 0 | Status: DISCONTINUED | OUTPATIENT
Start: 2022-10-25 | End: 2022-10-30

## 2022-10-25 RX ORDER — ACETAMINOPHEN 500 MG
700 TABLET ORAL ONCE
Refills: 0 | Status: COMPLETED | OUTPATIENT
Start: 2022-10-25 | End: 2022-10-25

## 2022-10-25 RX ORDER — VANCOMYCIN HCL 1 G
750 VIAL (EA) INTRAVENOUS EVERY 12 HOURS
Refills: 0 | Status: DISCONTINUED | OUTPATIENT
Start: 2022-10-25 | End: 2022-10-27

## 2022-10-25 RX ORDER — VANCOMYCIN HCL 1 G
500 VIAL (EA) INTRAVENOUS EVERY 12 HOURS
Refills: 0 | Status: DISCONTINUED | OUTPATIENT
Start: 2022-10-25 | End: 2022-10-25

## 2022-10-25 RX ADMIN — POLYETHYLENE GLYCOL 3350 17 GRAM(S): 17 POWDER, FOR SOLUTION ORAL at 12:11

## 2022-10-25 RX ADMIN — MONTELUKAST 10 MILLIGRAM(S): 4 TABLET, CHEWABLE ORAL at 21:56

## 2022-10-25 RX ADMIN — ONDANSETRON 4 MILLIGRAM(S): 8 TABLET, FILM COATED ORAL at 21:55

## 2022-10-25 RX ADMIN — ATORVASTATIN CALCIUM 10 MILLIGRAM(S): 80 TABLET, FILM COATED ORAL at 21:56

## 2022-10-25 RX ADMIN — Medication 1 TABLET(S): at 12:11

## 2022-10-25 RX ADMIN — PIPERACILLIN AND TAZOBACTAM 25 GRAM(S): 4; .5 INJECTION, POWDER, LYOPHILIZED, FOR SOLUTION INTRAVENOUS at 05:36

## 2022-10-25 RX ADMIN — GABAPENTIN 300 MILLIGRAM(S): 400 CAPSULE ORAL at 05:34

## 2022-10-25 RX ADMIN — Medication 5000 UNIT(S): at 13:54

## 2022-10-25 RX ADMIN — PANTOPRAZOLE SODIUM 40 MILLIGRAM(S): 20 TABLET, DELAYED RELEASE ORAL at 06:21

## 2022-10-25 RX ADMIN — Medication 1 GRAM(S): at 05:35

## 2022-10-25 RX ADMIN — ONDANSETRON 4 MILLIGRAM(S): 8 TABLET, FILM COATED ORAL at 05:35

## 2022-10-25 RX ADMIN — GABAPENTIN 300 MILLIGRAM(S): 400 CAPSULE ORAL at 18:34

## 2022-10-25 RX ADMIN — ONDANSETRON 4 MILLIGRAM(S): 8 TABLET, FILM COATED ORAL at 13:54

## 2022-10-25 RX ADMIN — ENOXAPARIN SODIUM 40 MILLIGRAM(S): 100 INJECTION SUBCUTANEOUS at 12:11

## 2022-10-25 RX ADMIN — SENNA PLUS 2 TABLET(S): 8.6 TABLET ORAL at 21:56

## 2022-10-25 RX ADMIN — Medication 250 MILLIGRAM(S): at 18:38

## 2022-10-25 RX ADMIN — Medication 280 MILLIGRAM(S): at 15:59

## 2022-10-25 RX ADMIN — Medication 100 MILLIGRAM(S): at 21:56

## 2022-10-25 RX ADMIN — PIPERACILLIN AND TAZOBACTAM 25 GRAM(S): 4; .5 INJECTION, POWDER, LYOPHILIZED, FOR SOLUTION INTRAVENOUS at 13:53

## 2022-10-25 RX ADMIN — Medication 3: at 08:38

## 2022-10-25 RX ADMIN — Medication 650 MILLIGRAM(S): at 05:22

## 2022-10-25 RX ADMIN — Medication 1 GRAM(S): at 18:34

## 2022-10-25 RX ADMIN — Medication 650 MILLIGRAM(S): at 04:48

## 2022-10-25 RX ADMIN — SODIUM CHLORIDE 100 MILLILITER(S): 9 INJECTION INTRAMUSCULAR; INTRAVENOUS; SUBCUTANEOUS at 15:54

## 2022-10-25 RX ADMIN — MORPHINE SULFATE 2 MILLIGRAM(S): 50 CAPSULE, EXTENDED RELEASE ORAL at 10:30

## 2022-10-25 RX ADMIN — MORPHINE SULFATE 2 MILLIGRAM(S): 50 CAPSULE, EXTENDED RELEASE ORAL at 10:00

## 2022-10-25 NOTE — CONSULT NOTE ADULT - SUBJECTIVE AND OBJECTIVE BOX
Chief Complaint:  Consult for Odynophagia/Dysphagia      HPI:     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 pHTN (dx on imaging 7/21/22), ventral hernia repair cb left inferior epigastric pseudoaneurysm, T2DM a1c 7.4%, HTN presenting with fever and chills found to have ESBL E coli UTI with GI consulted for odynophagia/dysphagia. patient undergoing abraxane/gemzar chemotherapy reinitiated since 8/2022 last dose of chemotherapy 10/11. Has undergone 15 sessions of radiation therapy targeting left lung last session 10/14 with no further sessions planned per daughter at bedside. Of note with recent admission 7/2022 with YUNI opacity identified and mets to lymph nodes thought to be pancreatobiliary in origin. Surgical Pathology Report:   ACCESSION No:  [id number]  Patient:   NOEMI SOUSA      Accession:                             [id number]    Collected Date/Time:                   1/21/2022 12:09 EST  Received Date/Time:                    1/22/2022 16:45 EST    Surgical Pathology Report - Auth (Verified)    Specimen(s) Submitted  1-Abdominal skin    Final Diagnosis  Skin, abdomen, revision  - Scar    Verified by: Manny Mejia MD  (Electronic Signature)  Reported on: 01/26/22 12:30 EST, [facility], [address] [place] [address]  Phone: [phone]   Fax: [phone]  _________________________________________________________________    Gross Description  Received:  In formalin labeled  "abdominal skin"    Orientation:  Not oriented  Size:  24.5 x 6.1 cm      Depth:  1.3 cm  Shape:  Ellipse  Inking:  Not inked  Area of Interest - Linear central incision:    20.5 cm in length, linear  incision with surrounding tan nodular scar measuring 1.7 x 1.2 cm and 5.3  x 2.7 cm  Total Thickness:  Full-thickness from epithelial surface to underlying  adipose tissue  Distance to Margin:  The incision is 0.6 cm from   the margin , and the  scar comes to within 1.1 cm from the margin  Epidermal Surface:  Tan-light brown  Cut Surfaces:  Fibrotic  Submitted:  Representative sections in 1 cassette:  1A: sections of scars    Shine CASTLE ([facility]) 01/24/2022 12:32 PM    Disclaimer  In addition to other data that may appear on the specimen containers, all  labelshave been inspected to confirm the presence of the patient's name  and date of birth.    Histological Processing Performed at [facility],  Department of Pathology, [address] [place] [address]. (01.21.22 @ 12:09)        Patient is Spanish speaking and daughter provider translation.     Last Colonoscopy:  Last Endoscopy:    Allergies:  No Known Allergies      Home Medications:    Hospital Medications:  acetaminophen     Tablet .. 650 milliGRAM(s) Oral every 6 hours PRN  atorvastatin 10 milliGRAM(s) Oral at bedtime  bisacodyl 5 milliGRAM(s) Oral daily PRN  cholecalciferol 5000 Unit(s) Oral daily  dextrose 50% Injectable 25 Gram(s) IV Push once  dextrose 50% Injectable 12.5 Gram(s) IV Push once  dextrose 50% Injectable 25 Gram(s) IV Push once  dextrose Oral Gel 15 Gram(s) Oral once  enoxaparin Injectable 40 milliGRAM(s) SubCutaneous every 24 hours  gabapentin 300 milliGRAM(s) Oral two times a day  glucagon  Injectable 1 milliGRAM(s) IntraMuscular once  guaiFENesin Oral Liquid (Sugar-Free) 100 milliGRAM(s) Oral every 6 hours PRN  insulin lispro (ADMELOG) corrective regimen sliding scale   SubCutaneous three times a day before meals  insulin lispro (ADMELOG) corrective regimen sliding scale   SubCutaneous at bedtime  lactated ringers. 1000 milliLiter(s) IV Continuous <Continuous>  montelukast 10 milliGRAM(s) Oral at bedtime  morphine  - Injectable 2 milliGRAM(s) IV Push every 4 hours PRN  morphine  - Injectable 4 milliGRAM(s) IV Push every 4 hours PRN  multivitamin 1 Tablet(s) Oral daily  naloxone Injectable 0.4 milliGRAM(s) IV Push once  ondansetron   Disintegrating Tablet 4 milliGRAM(s) Oral every 8 hours  pantoprazole   Suspension 40 milliGRAM(s) Oral before breakfast  piperacillin/tazobactam IVPB.. 3.375 Gram(s) IV Intermittent every 8 hours  polyethylene glycol 3350 17 Gram(s) Oral daily  senna 2 Tablet(s) Oral at bedtime  sucralfate 1 Gram(s) Oral two times a day      PMHX/PSHX:  No pertinent past medical history    Essential hypertension    Gastroesophageal reflux disease without esophagitis    Mild intermittent asthma without complication    Arthritis    Neuropathy    Type 2 diabetes mellitus without complication, without long-term current use of insulin    Adenocarcinoma of gallbladder    Cancer of ampulla of Vater    No significant past surgical history    H/O Whipple procedure    Hemorrhage of gastroduodenal artery    H/O drainage of abscess    H/O eye surgery    Admission for chemotherapy        Family history:  No pertinent family history in first degree relatives    Family history of diabetes mellitus (DM) (Sibling)    FH: HTN (hypertension) (Sibling)        Social History:     ROS:   General:  No fevers, chills or night sweats.  ENT:  No sore throat or dysphagia  CV:  No pain or palpitations  Resp:  No dyspnea, cough, wheezing  GI:  No pain, No nausea, No vomiting, No diarrhea, No constipation, No weight loss, No pruritis, No rectal bleeding, No tarry stools  Skin:  No rash or edema      PHYSICAL EXAM:   GENERAL:  NAD, Appears stated age  HEENT:  NC/AT,  conjunctivae clear and pink, sclera -anicteric  CHEST:  CTA B/L, Normal effort  HEART:  RRR S1/S2, No murmurs  ABDOMEN:  Soft, non-tender, non-distended, normoactive bowel sounds,  no masses ,no hepato-splenomegaly, no signs of chronic liver disease  EXTEREMITIES:  No cyanosis or Edema  SKIN:  Warm & Dry. No rash or erythema  NEURO:  Alert, oriented    Vital Signs:  Vital Signs Last 24 Hrs  T(C): 36.8 (25 Oct 2022 10:00), Max: 38.3 (24 Oct 2022 19:38)  T(F): 98.2 (25 Oct 2022 10:00), Max: 101 (24 Oct 2022 19:38)  HR: 75 (25 Oct 2022 10:00) (74 - 95)  BP: 113/72 (25 Oct 2022 10:00) (104/63 - 115/64)  BP(mean): --  RR: 18 (25 Oct 2022 10:00) (18 - 18)  SpO2: 99% (25 Oct 2022 10:00) (97% - 100%)    Parameters below as of 25 Oct 2022 10:00  Patient On (Oxygen Delivery Method): room air      Daily     Daily     LABS:                        8.1    4.87  )-----------( 159      ( 25 Oct 2022 07:20 )             25.7     Mean Cell Volume: 75.1 fL (10-25-22 @ 07:20)    10-25    137  |  103  |  5<L>  ----------------------------<  232<H>  3.2<L>   |  22  |  0.56    Ca    8.1<L>      25 Oct 2022 07:20  Phos  2.2     10-25  Mg     2.00     10-25                                    8.1    4.87  )-----------( 159      ( 25 Oct 2022 07:20 )             25.7                         7.8    4.24  )-----------( 115      ( 23 Oct 2022 07:20 )             24.4     Imaging:    Evens Guerrero MD  Internal Medicine  Pager #[phone]         Chief Complaint:  Consult for Odynophagia/Dysphagia      HPI:     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 pHTN (dx on imaging 7/21/22), ventral hernia repair cb left inferior epigastric pseudoaneurysm, T2DM a1c 7.4%, HTN presenting with fever and chills found to have ESBL E coli UTI with GI consulted for odynophagia/dysphagia. patient undergoing abraxane/gemzar chemotherapy reinitiated since 8/2022 last dose of chemotherapy 10/11. Has undergone 15 sessions of radiation therapy targeting left lung last session 10/14 with no further sessions planned per daughter at bedside. Of note with recent admission 7/2022 with YUNI opacity identified and mets to lymph nodes thought to be pancreatobiliary in origin.       Patient is Telugu speaking and daughter provider translation.     Last Colonoscopy:  Last Endoscopy:    Allergies:  No Known Allergies      Home Medications:    Hospital Medications:  acetaminophen     Tablet .. 650 milliGRAM(s) Oral every 6 hours PRN  atorvastatin 10 milliGRAM(s) Oral at bedtime  bisacodyl 5 milliGRAM(s) Oral daily PRN  cholecalciferol 5000 Unit(s) Oral daily  dextrose 50% Injectable 25 Gram(s) IV Push once  dextrose 50% Injectable 12.5 Gram(s) IV Push once  dextrose 50% Injectable 25 Gram(s) IV Push once  dextrose Oral Gel 15 Gram(s) Oral once  enoxaparin Injectable 40 milliGRAM(s) SubCutaneous every 24 hours  gabapentin 300 milliGRAM(s) Oral two times a day  glucagon  Injectable 1 milliGRAM(s) IntraMuscular once  guaiFENesin Oral Liquid (Sugar-Free) 100 milliGRAM(s) Oral every 6 hours PRN  insulin lispro (ADMELOG) corrective regimen sliding scale   SubCutaneous three times a day before meals  insulin lispro (ADMELOG) corrective regimen sliding scale   SubCutaneous at bedtime  lactated ringers. 1000 milliLiter(s) IV Continuous <Continuous>  montelukast 10 milliGRAM(s) Oral at bedtime  morphine  - Injectable 2 milliGRAM(s) IV Push every 4 hours PRN  morphine  - Injectable 4 milliGRAM(s) IV Push every 4 hours PRN  multivitamin 1 Tablet(s) Oral daily  naloxone Injectable 0.4 milliGRAM(s) IV Push once  ondansetron   Disintegrating Tablet 4 milliGRAM(s) Oral every 8 hours  pantoprazole   Suspension 40 milliGRAM(s) Oral before breakfast  piperacillin/tazobactam IVPB.. 3.375 Gram(s) IV Intermittent every 8 hours  polyethylene glycol 3350 17 Gram(s) Oral daily  senna 2 Tablet(s) Oral at bedtime  sucralfate 1 Gram(s) Oral two times a day      PMHX/PSHX:  No pertinent past medical history    Essential hypertension    Gastroesophageal reflux disease without esophagitis    Mild intermittent asthma without complication    Arthritis    Neuropathy    Type 2 diabetes mellitus without complication, without long-term current use of insulin    Adenocarcinoma of gallbladder    Cancer of ampulla of Vater    No significant past surgical history    H/O Whipple procedure    Hemorrhage of gastroduodenal artery    H/O drainage of abscess    H/O eye surgery    Admission for chemotherapy        Family history:  No pertinent family history in first degree relatives    Family history of diabetes mellitus (DM) (Sibling)    FH: HTN (hypertension) (Sibling)        Social History:     ROS:   General:  No fevers, chills or night sweats.  ENT:  No sore throat or dysphagia  CV:  No pain or palpitations  Resp:  No dyspnea, cough, wheezing  GI:  No pain, No nausea, No vomiting, No diarrhea, No constipation, No weight loss, No pruritis, No rectal bleeding, No tarry stools  Skin:  No rash or edema      PHYSICAL EXAM:   GENERAL:  NAD, Appears stated age  HEENT:  NC/AT,  conjunctivae clear and pink, sclera -anicteric  CHEST:  CTA B/L, Normal effort  HEART:  RRR S1/S2, No murmurs  ABDOMEN:  Soft, non-tender, non-distended, normoactive bowel sounds,  no masses ,no hepato-splenomegaly, no signs of chronic liver disease  EXTEREMITIES:  No cyanosis or Edema  SKIN:  Warm & Dry. No rash or erythema  NEURO:  Alert, oriented    Vital Signs:  Vital Signs Last 24 Hrs  T(C): 36.8 (25 Oct 2022 10:00), Max: 38.3 (24 Oct 2022 19:38)  T(F): 98.2 (25 Oct 2022 10:00), Max: 101 (24 Oct 2022 19:38)  HR: 75 (25 Oct 2022 10:00) (74 - 95)  BP: 113/72 (25 Oct 2022 10:00) (104/63 - 115/64)  BP(mean): --  RR: 18 (25 Oct 2022 10:00) (18 - 18)  SpO2: 99% (25 Oct 2022 10:00) (97% - 100%)    Parameters below as of 25 Oct 2022 10:00  Patient On (Oxygen Delivery Method): room air      Daily     Daily     LABS:                        8.1    4.87  )-----------( 159      ( 25 Oct 2022 07:20 )             25.7     Mean Cell Volume: 75.1 fL (10-25-22 @ 07:20)    10-25    137  |  103  |  5<L>  ----------------------------<  232<H>  3.2<L>   |  22  |  0.56    Ca    8.1<L>      25 Oct 2022 07:20  Phos  2.2     10-25  Mg     2.00     10-25                                    8.1    4.87  )-----------( 159      ( 25 Oct 2022 07:20 )             25.7                         7.8    4.24  )-----------( 115      ( 23 Oct 2022 07:20 )             24.4     Imaging:    Evens Guerrero MD  Internal Medicine  Pager #[phone]         Chief Complaint:  Consult for Odynophagia/Dysphagia      HPI:     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 pHTN (dx on imaging 7/21/22), ventral hernia repair cb left inferior epigastric pseudoaneurysm, T2DM a1c 7.4%, HTN presenting with fever and chills found to have ESBL E coli UTI with GI consulted for odynophagia/dysphagia. patient undergoing abraxane/gemzar chemotherapy reinitiated since 8/2022 last dose of chemotherapy 10/11. Has undergone 15 sessions of radiation therapy targeting left lung last session 10/14 with no further sessions planned per daughter at bedside. Of note with recent admission 7/2022 with YUNI opacity identified and mets to lymph nodes thought to be pancreatobiliary in origin. She has been experiencing a stuck feeling in her chest "like a tightness" when eating and then food will eventually pass. Additionally with odynophagia associated with both solids and liquids that is acutely worse in the past week. noted to have previous odynophagia requiring sucralfate/lidocaine/oxycodone and then magic mouth wash with which the patient was adherent until 10/21/22. Additionally takes PPI outpatient and an OTC medication for dyspepsia which provided some relief of her symptoms. With prior oral candidate requiring nystatin for which she would swish and spit rather than swallow due to esophageal discomfort. She has a chronic cough for which she was prescribed robitussin/guaifenesin  and codeine syrup previously and daughter states the only time patient with hemoptysis was during periods of frequent coughing and is without recent hemoptysis or hematemesis. Daughter at bedside states no known history of other esophageal infection such as CMV. She states last known patient weight was 103lbs on admission. Eats typical Portuguese food prepared by daughter with many spices but not spicy. Per discussion with primary team, patient, and daughter at bedside, patient has not been experiencing any diarrhea.       Patient is Portuguese speaking and daughter provider translation.     [facility]  Radiation Oncology Raphael Payan    Last Colonoscopy: 10/2021 Non-bleeding internal hemorrhoids. Two 2 to 3 mm polyps in the transverse colon and at the hepatic flexure, removed with a jumbo cold forceps. Resected and retrieved.One 7 mm polyp in the transverse colon, removed with a cold snare. Resected and retrieved.    Last Endoscopy: ERCP 2019  A medium-sized fungating mass was found at the major papilla. One stent was removed from the biliary tree.One plastic stent was placed into the common bile duct.One stent was removedfrom the biliary tree.  The major papilla appeared to have a mass.      Allergies:  No Known Allergies      Home Medications:    Hospital Medications:  acetaminophen     Tablet .. 650 milliGRAM(s) Oral every 6 hours PRN  atorvastatin 10 milliGRAM(s) Oral at bedtime  bisacodyl 5 milliGRAM(s) Oral daily PRN  cholecalciferol 5000 Unit(s) Oral daily  dextrose 50% Injectable 25 Gram(s) IV Push once  dextrose 50% Injectable 12.5 Gram(s) IV Push once  dextrose 50% Injectable 25 Gram(s) IV Push once  dextrose Oral Gel 15 Gram(s) Oral once  enoxaparin Injectable 40 milliGRAM(s) SubCutaneous every 24 hours  gabapentin 300 milliGRAM(s) Oral two times a day  glucagon  Injectable 1 milliGRAM(s) IntraMuscular once  guaiFENesin Oral Liquid (Sugar-Free) 100 milliGRAM(s) Oral every 6 hours PRN  insulin lispro (ADMELOG) corrective regimen sliding scale   SubCutaneous three times a day before meals  insulin lispro (ADMELOG) corrective regimen sliding scale   SubCutaneous at bedtime  lactated ringers. 1000 milliLiter(s) IV Continuous <Continuous>  montelukast 10 milliGRAM(s) Oral at bedtime  morphine  - Injectable 2 milliGRAM(s) IV Push every 4 hours PRN  morphine  - Injectable 4 milliGRAM(s) IV Push every 4 hours PRN  multivitamin 1 Tablet(s) Oral daily  naloxone Injectable 0.4 milliGRAM(s) IV Push once  ondansetron   Disintegrating Tablet 4 milliGRAM(s) Oral every 8 hours  pantoprazole   Suspension 40 milliGRAM(s) Oral before breakfast  piperacillin/tazobactam IVPB.. 3.375 Gram(s) IV Intermittent every 8 hours  polyethylene glycol 3350 17 Gram(s) Oral daily  senna 2 Tablet(s) Oral at bedtime  sucralfate 1 Gram(s) Oral two times a day      PMHX/PSHX:  No pertinent past medical history    Essential hypertension    Gastroesophageal reflux disease without esophagitis    Mild intermittent asthma without complication    Arthritis    Neuropathy    Type 2 diabetes mellitus without complication, without long-term current use of insulin    Adenocarcinoma of gallbladder    Cancer of ampulla of Vater    No significant past surgical history    H/O Whipple procedure    Hemorrhage of gastroduodenal artery    H/O drainage of abscess    H/O eye surgery    Admission for chemotherapy        Family history:  No pertinent family history in first degree relatives    Family history of diabetes mellitus (DM) (Sibling)    FH: HTN (hypertension) (Sibling)        Social History:     ROS:   General:  No fevers, chills or night sweats.  ENT:  +dysphagia and odynophagia  CV:  No pain or palpitations  Resp:  No dyspnea, cough, wheezing  GI:  +epigastric tenderness, No nausea, No vomiting, No diarrhea, No constipation, No weight loss, No pruritis, No rectal bleeding, No tarry stools  Skin:  No rash or edema      PHYSICAL EXAM:   GENERAL:  NAD, Appears stated age  HEENT: no intraoral lesions noted,  NC/AT,  conjunctivae clear and pink, sclera -anicteric  CHEST:  CTA B/L, Normal effort  HEART:  RRR S1/S2, No murmurs  ABDOMEN:  +mild epigastric tenderness to palpation, Soft, non-distended, normoactive bowel sounds,  no masses ,no hepato-splenomegaly, no signs of chronic liver disease  EXTEREMITIES:  No cyanosis or Edema  SKIN:  Warm & Dry. No rash or erythema  NEURO:  Alert, oriented    Vital Signs:  Vital Signs Last 24 Hrs  T(C): 36.8 (25 Oct 2022 10:00), Max: 38.3 (24 Oct 2022 19:38)  T(F): 98.2 (25 Oct 2022 10:00), Max: 101 (24 Oct 2022 19:38)  HR: 75 (25 Oct 2022 10:00) (74 - 95)  BP: 113/72 (25 Oct 2022 10:00) (104/63 - 115/64)  BP(mean): --  RR: 18 (25 Oct 2022 10:00) (18 - 18)  SpO2: 99% (25 Oct 2022 10:00) (97% - 100%)    Parameters below as of 25 Oct 2022 10:00  Patient On (Oxygen Delivery Method): room air      Daily     Daily     LABS:                          8.1    4.87  )-----------( 159      ( 25 Oct 2022 07:20 )             25.7     Mean Cell Volume: 75.1 fL (10-25-22 @ 07:20)    10-25    137  |  103  |  5<L>  ----------------------------<  232<H>  3.2<L>   |  22  |  0.56    Ca    8.1<L>      25 Oct 2022 07:20  Phos  2.2     10-25  Mg     2.00     10-25                                    8.1    4.87  )-----------( 159      ( 25 Oct 2022 07:20 )             25.7                         7.8    4.24  )-----------( 115      ( 23 Oct 2022 07:20 )             24.4     Imaging:    CT Abdomen and Pelvis w/ IV Cont (10.22.22 @ 01:27)     FINDINGS:  Lower chest: Stable small anterior peridiaphragmatic lymph nodes.    Liver: Normal. No mass.  Gallbladder and bile ducts: Cholecystectomy. No biliary ductal dilatation.  Pancreas: Status post Whipple procedure with stable and unremarkable   appearing pancreatic remnant.  Spleen: Normal. No splenomegaly.  Adrenal glands: Normal. No mass.  Kidneys and ureters: Normal. No hydronephrosis.  Stomach and bowel: Status post gastrojejunostomy as per Whipple   procedure. No bowel wall thickening or obstruction.  Appendix: No evidence of appendicitis.    Intraperitoneal space: Unremarkable. No free air. No significant fluid  collection.  Vasculature:  No abdominal aortic aneurysm.  Urinary bladder: Within normal limits.  Reproductive: Within normal limits.  Bones/joints: No acute fracture.  Soft tissues: Postop changes in the abdominal wall.    IMPRESSION:  No acute findings or cause for symptoms identified.  Status post Whipple procedure with expected operative changes.       MR Abdomen w/ IV Cont (07.21.22 @ 19:46)   LOWER CHEST: Clustered prominent bilateral cardiophrenic lymph nodes   measuring up to 1.5 x 0.8 cm on the right (12:10), previously measurement   1.5 x 0.8 cm.  Stable approximately 1 cm nodular enhancement mediallyat the right   posterior costophrenic sulcus    LIVER: Cirrhosis. No focal hepatic lesions.  BILE DUCTS: Post hepaticojejunostomy. Mild intrahepatic bile duct   dilation.  GALLBLADDER: Cholecystectomy.  SPLEEN: Within normal limits.  PANCREAS: Whipple procedure. Couple scattered pulmonary side branch ducts   measuring under 5 mm. No main duct dilation.  ADRENALS: Within normal limits.  KIDNEYS/URETERS: Symmetric renal enhancement without hydronephrosis.    VISUALIZED PORTIONS:  BOWEL: Postoperative change. Unobstructed bowel. Large fecal load.   Appendix is normal.  PERITONEUM: No ascites.  VESSELS: Within normal limits.  RETROPERITONEUM/LYMPH NODES: No lymphadenopathy.  ABDOMINAL WALL: Remote anterior abdominal wall postoperative changes.   Resolved anterior abdominal wall collection  BONES: Multilevel degenerative changes throughout the spine.    IMPRESSION:    Stable prominent right cardiophrenic lymph nodes and probably pleural   nodular enhancement medially at the posterior right costophrenic sulcus    Stable Whipple postoperative changes. No imaging evidence of locally   recurrent or metastatic disease involving the abdomen    Cirrhosis. No imaging evidence of portal hypertensive change             Chief Complaint:  Consult for Odynophagia/Dysphagia      HPI:     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 pHTN (dx on imaging 7/21/22), ventral hernia repair cb left inferior epigastric pseudoaneurysm, T2DM a1c 7.4%, HTN presenting with fever and chills found to have ESBL E coli UTI with GI consulted for odynophagia/dysphagia. patient undergoing abraxane/gemzar chemotherapy reinitiated since 8/2022 last dose of chemotherapy 10/11. Has undergone 15 sessions of radiation therapy targeting left lung last session 10/14 with no further sessions planned per daughter at bedside. Of note with recent admission 7/2022 with YUNI opacity identified and mets to lymph nodes thought to be pancreatobiliary in origin. She has been experiencing a stuck feeling in her chest "like a tightness" when eating and then food will eventually pass. Additionally with odynophagia associated with both solids and liquids that is acutely worse in the past week. noted to have previous odynophagia requiring sucralfate/lidocaine/oxycodone and then magic mouth wash with which the patient was adherent until 10/21/22. Additionally takes PPI outpatient and an OTC medication for dyspepsia which provided some relief of her symptoms. With prior oral candidate requiring nystatin for which she would swish and spit rather than swallow due to esophageal discomfort. She has a chronic cough for which she was prescribed robitussin/guaifenesin  and codeine syrup previously and daughter states the only time patient with hemoptysis was during periods of frequent coughing and is without recent hemoptysis or hematemesis. Daughter at bedside states no known history of other esophageal infection such as CMV. She states last known patient weight was 103lbs on admission. Eats typical Bulgarian food prepared by daughter with many spices but not spicy. Per discussion with primary team, patient, and daughter at bedside, patient has not been experiencing any diarrhea.       Patient is Bulgarian speaking and daughter provider translation.     [facility]  [facility] Oncology Raphael Payan    Last Colonoscopy: 10/2021 Non-bleeding internal hemorrhoids. Two 2 to 3 mm polyps in the transverse colon and at the hepatic flexure, removed with a jumbo cold forceps. Resected and retrieved.One 7 mm polyp in the transverse colon, removed with a cold snare. Resected and retrieved.    Last Endoscopy: ERCP 2019  A medium-sized fungating mass was found at the major papilla. One stent was removed from the biliary tree.One plastic stent was placed into the common bile duct.One stent was removedfrom the biliary tree.  The major papilla appeared to have a mass.      Allergies:  No Known Allergies      Home Medications:    Hospital Medications:  acetaminophen     Tablet .. 650 milliGRAM(s) Oral every 6 hours PRN  atorvastatin 10 milliGRAM(s) Oral at bedtime  bisacodyl 5 milliGRAM(s) Oral daily PRN  cholecalciferol 5000 Unit(s) Oral daily  dextrose 50% Injectable 25 Gram(s) IV Push once  dextrose 50% Injectable 12.5 Gram(s) IV Push once  dextrose 50% Injectable 25 Gram(s) IV Push once  dextrose Oral Gel 15 Gram(s) Oral once  enoxaparin Injectable 40 milliGRAM(s) SubCutaneous every 24 hours  gabapentin 300 milliGRAM(s) Oral two times a day  glucagon  Injectable 1 milliGRAM(s) IntraMuscular once  guaiFENesin Oral Liquid (Sugar-Free) 100 milliGRAM(s) Oral every 6 hours PRN  insulin lispro (ADMELOG) corrective regimen sliding scale   SubCutaneous three times a day before meals  insulin lispro (ADMELOG) corrective regimen sliding scale   SubCutaneous at bedtime  lactated ringers. 1000 milliLiter(s) IV Continuous <Continuous>  montelukast 10 milliGRAM(s) Oral at bedtime  morphine  - Injectable 2 milliGRAM(s) IV Push every 4 hours PRN  morphine  - Injectable 4 milliGRAM(s) IV Push every 4 hours PRN  multivitamin 1 Tablet(s) Oral daily  naloxone Injectable 0.4 milliGRAM(s) IV Push once  ondansetron   Disintegrating Tablet 4 milliGRAM(s) Oral every 8 hours  pantoprazole   Suspension 40 milliGRAM(s) Oral before breakfast  piperacillin/tazobactam IVPB.. 3.375 Gram(s) IV Intermittent every 8 hours  polyethylene glycol 3350 17 Gram(s) Oral daily  senna 2 Tablet(s) Oral at bedtime  sucralfate 1 Gram(s) Oral two times a day      PMHX/PSHX:  No pertinent past medical history    Essential hypertension    Gastroesophageal reflux disease without esophagitis    Mild intermittent asthma without complication    Arthritis    Neuropathy    Type 2 diabetes mellitus without complication, without long-term current use of insulin    Adenocarcinoma of gallbladder    Cancer of ampulla of Vater    No significant past surgical history    H/O Whipple procedure    Hemorrhage of gastroduodenal artery    H/O drainage of abscess    H/O eye surgery    Admission for chemotherapy        Family history:  No pertinent family history in first degree relatives    Family history of diabetes mellitus (DM) (Sibling)    FH: HTN (hypertension) (Sibling)        Social History:     ROS:   General:  No fevers, chills or night sweats.  ENT:  +dysphagia and odynophagia  CV:  No pain or palpitations  Resp:  No dyspnea, cough, wheezing  GI:  +epigastric tenderness, No nausea, No vomiting, No diarrhea, No constipation, No weight loss, No pruritis, No rectal bleeding, No tarry stools  Skin:  No rash or edema      PHYSICAL EXAM:   GENERAL:  fatigued  HEENT: +poor dentition, no oral thrush,  NC/AT,  conjunctivae clear and pink, sclera -anicteric  CHEST:  CTA B/L, Normal effort  HEART:  RRR S1/S2, No murmurs  ABDOMEN:  +mild epigastric tenderness to palpation, Soft, non-distended, normoactive bowel sounds,  no masses ,no hepato-splenomegaly, no signs of chronic liver disease  EXTEREMITIES:  No cyanosis or Edema  SKIN:  Warm & Dry. No rash or erythema  NEURO:  Alert, oriented    Vital Signs:  Vital Signs Last 24 Hrs  T(C): 36.8 (25 Oct 2022 10:00), Max: 38.3 (24 Oct 2022 19:38)  T(F): 98.2 (25 Oct 2022 10:00), Max: 101 (24 Oct 2022 19:38)  HR: 75 (25 Oct 2022 10:00) (74 - 95)  BP: 113/72 (25 Oct 2022 10:00) (104/63 - 115/64)  BP(mean): --  RR: 18 (25 Oct 2022 10:00) (18 - 18)  SpO2: 99% (25 Oct 2022 10:00) (97% - 100%)    Parameters below as of 25 Oct 2022 10:00  Patient On (Oxygen Delivery Method): room air      Daily     Daily     LABS:                          8.1    4.87  )-----------( 159      ( 25 Oct 2022 07:20 )             25.7     Mean Cell Volume: 75.1 fL (10-25-22 @ 07:20)    10-25    137  |  103  |  5<L>  ----------------------------<  232<H>  3.2<L>   |  22  |  0.56    Ca    8.1<L>      25 Oct 2022 07:20  Phos  2.2     10-25  Mg     2.00     10-25                                    8.1    4.87  )-----------( 159      ( 25 Oct 2022 07:20 )             25.7                         7.8    4.24  )-----------( 115      ( 23 Oct 2022 07:20 )             24.4     Imaging:    CT Abdomen and Pelvis w/ IV Cont (10.22.22 @ 01:27)     FINDINGS:  Lower chest: Stable small anterior peridiaphragmatic lymph nodes.    Liver: Normal. No mass.  Gallbladder and bile ducts: Cholecystectomy. No biliary ductal dilatation.  Pancreas: Status post Whipple procedure with stable and unremarkable   appearing pancreatic remnant.  Spleen: Normal. No splenomegaly.  Adrenal glands: Normal. No mass.  Kidneys and ureters: Normal. No hydronephrosis.  Stomach and bowel: Status post gastrojejunostomy as per Whipple   procedure. No bowel wall thickening or obstruction.  Appendix: No evidence of appendicitis.    Intraperitoneal space: Unremarkable. No free air. No significant fluid  collection.  Vasculature:  No abdominal aortic aneurysm.  Urinary bladder: Within normal limits.  Reproductive: Within normal limits.  Bones/joints: No acute fracture.  Soft tissues: Postop changes in the abdominal wall.    IMPRESSION:  No acute findings or cause for symptoms identified.  Status post Whipple procedure with expected operative changes.       MR Abdomen w/ IV Cont (07.21.22 @ 19:46)   LOWER CHEST: Clustered prominent bilateral cardiophrenic lymph nodes   measuring up to 1.5 x 0.8 cm on the right (12:10), previously measurement   1.5 x 0.8 cm.  Stable approximately 1 cm nodular enhancement mediallyat the right   posterior costophrenic sulcus    LIVER: Cirrhosis. No focal hepatic lesions.  BILE DUCTS: Post hepaticojejunostomy. Mild intrahepatic bile duct   dilation.  GALLBLADDER: Cholecystectomy.  SPLEEN: Within normal limits.  PANCREAS: Whipple procedure. Couple scattered pulmonary side branch ducts   measuring under 5 mm. No main duct dilation.  ADRENALS: Within normal limits.  KIDNEYS/URETERS: Symmetric renal enhancement without hydronephrosis.    VISUALIZED PORTIONS:  BOWEL: Postoperative change. Unobstructed bowel. Large fecal load.   Appendix is normal.  PERITONEUM: No ascites.  VESSELS: Within normal limits.  RETROPERITONEUM/LYMPH NODES: No lymphadenopathy.  ABDOMINAL WALL: Remote anterior abdominal wall postoperative changes.   Resolved anterior abdominal wall collection  BONES: Multilevel degenerative changes throughout the spine.    IMPRESSION:    Stable prominent right cardiophrenic lymph nodes and probably pleural   nodular enhancement medially at the posterior right costophrenic sulcus    Stable Whipple postoperative changes. No imaging evidence of locally   recurrent or metastatic disease involving the abdomen    Cirrhosis. No imaging evidence of portal hypertensive change             Chief Complaint:  Consult for Odynophagia/Dysphagia      HPI:     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 pHTN (dx on imaging 7/21/22), ventral hernia repair cb left inferior epigastric pseudoaneurysm, T2DM a1c 7.4%, HTN presenting with fever and chills found to have ESBL E coli UTI with GI consulted for odynophagia/dysphagia. patient undergoing abraxane/gemzar chemotherapy reinitiated since 8/2022 last dose of chemotherapy 10/11. Has undergone 15 sessions of radiation therapy targeting left lung last session 10/14 with no further sessions planned per daughter at bedside. Of note with recent admission 7/2022 with YUNI opacity identified and mets to lymph nodes thought to be pancreatobiliary in origin. She has been experiencing a stuck feeling in her chest "like a tightness" when eating and then food will eventually pass. Additionally with odynophagia associated with both solids and liquids that is acutely worse in the past week. noted to have previous odynophagia requiring sucralfate/lidocaine/oxycodone and then magic mouth wash with which the patient was adherent until 10/21/22. Additionally takes PPI outpatient and an OTC medication for dyspepsia which provided some relief of her symptoms. With prior oral candidate requiring nystatin for which she would swish and spit rather than swallow due to esophageal discomfort. She has a chronic cough for which she was prescribed robitussin/guaifenesin  and codeine syrup previously and daughter states the only time patient with hemoptysis was during periods of frequent coughing and is without recent hemoptysis or hematemesis. Daughter at bedside states no known history of other esophageal infection such as CMV. She states last known patient weight was 103lbs on admission. Eats typical [place] food prepared by daughter with many spices but not spicy. Per discussion with primary team, patient, and daughter at bedside, patient has not been experiencing any diarrhea. No hematochezia or melena.       Patient is Welsh speaking and daughter provider translation.     [facility] Dr. Jesus Ely [place]  [facility]    Last Colonoscopy: 10/2021 Non-bleeding internal hemorrhoids. Two 2 to 3 mm polyps in the transverse colon and at the hepatic flexure, removed with a jumbo cold forceps. Resected and retrieved.One 7 mm polyp in the transverse colon, removed with a cold snare. Resected and retrieved.    Last Endoscopy: ERCP 2019  A medium-sized fungating mass was found at the major papilla. One stent was removed from the biliary tree.One plastic stent was placed into the common bile duct.One stent was removedfrom the biliary tree.  The major papilla appeared to have a mass.      Allergies:  No Known Allergies      Home Medications:    Hospital Medications:  acetaminophen     Tablet .. 650 milliGRAM(s) Oral every 6 hours PRN  atorvastatin 10 milliGRAM(s) Oral at bedtime  bisacodyl 5 milliGRAM(s) Oral daily PRN  cholecalciferol 5000 Unit(s) Oral daily  dextrose 50% Injectable 25 Gram(s) IV Push once  dextrose 50% Injectable 12.5 Gram(s) IV Push once  dextrose 50% Injectable 25 Gram(s) IV Push once  dextrose Oral Gel 15 Gram(s) Oral once  enoxaparin Injectable 40 milliGRAM(s) SubCutaneous every 24 hours  gabapentin 300 milliGRAM(s) Oral two times a day  glucagon  Injectable 1 milliGRAM(s) IntraMuscular once  guaiFENesin Oral Liquid (Sugar-Free) 100 milliGRAM(s) Oral every 6 hours PRN  insulin lispro (ADMELOG) corrective regimen sliding scale   SubCutaneous three times a day before meals  insulin lispro (ADMELOG) corrective regimen sliding scale   SubCutaneous at bedtime  lactated ringers. 1000 milliLiter(s) IV Continuous <Continuous>  montelukast 10 milliGRAM(s) Oral at bedtime  morphine  - Injectable 2 milliGRAM(s) IV Push every 4 hours PRN  morphine  - Injectable 4 milliGRAM(s) IV Push every 4 hours PRN  multivitamin 1 Tablet(s) Oral daily  naloxone Injectable 0.4 milliGRAM(s) IV Push once  ondansetron   Disintegrating Tablet 4 milliGRAM(s) Oral every 8 hours  pantoprazole   Suspension 40 milliGRAM(s) Oral before breakfast  piperacillin/tazobactam IVPB.. 3.375 Gram(s) IV Intermittent every 8 hours  polyethylene glycol 3350 17 Gram(s) Oral daily  senna 2 Tablet(s) Oral at bedtime  sucralfate 1 Gram(s) Oral two times a day      PMHX/PSHX:  No pertinent past medical history    Essential hypertension    Gastroesophageal reflux disease without esophagitis    Mild intermittent asthma without complication    Arthritis    Neuropathy    Type 2 diabetes mellitus without complication, without long-term current use of insulin    Adenocarcinoma of gallbladder    Cancer of ampulla of Vater    No significant past surgical history    H/O Whipple procedure    Hemorrhage of gastroduodenal artery    H/O drainage of abscess    H/O eye surgery    Admission for chemotherapy        Family history:  No pertinent family history in first degree relatives    Family history of diabetes mellitus (DM) (Sibling)    FH: HTN (hypertension) (Sibling)        Social History:     ROS:   General:  No fevers, chills or night sweats.  ENT:  +dysphagia and odynophagia  CV:  No pain or palpitations  Resp:  No dyspnea, cough, wheezing  GI:  +epigastric tenderness, No nausea, No vomiting, No diarrhea, No constipation, No weight loss, No pruritis, No rectal bleeding, No tarry stools  Skin:  No rash or edema      PHYSICAL EXAM:   GENERAL:  fatigued  HEENT: +poor dentition, no oral thrush,  NC/AT,  conjunctivae clear and pink, sclera -anicteric  CHEST:  CTA B/L, Normal effort  HEART:  RRR S1/S2, No murmurs  ABDOMEN:  +mild epigastric tenderness to palpation, Soft, non-distended, normoactive bowel sounds,  no masses ,no hepato-splenomegaly, no signs of chronic liver disease  EXTEREMITIES:  No cyanosis or Edema  SKIN:  Warm & Dry. No rash or erythema  NEURO:  Alert, oriented    Vital Signs:  Vital Signs Last 24 Hrs  T(C): 36.8 (25 Oct 2022 10:00), Max: 38.3 (24 Oct 2022 19:38)  T(F): 98.2 (25 Oct 2022 10:00), Max: 101 (24 Oct 2022 19:38)  HR: 75 (25 Oct 2022 10:00) (74 - 95)  BP: 113/72 (25 Oct 2022 10:00) (104/63 - 115/64)  BP(mean): --  RR: 18 (25 Oct 2022 10:00) (18 - 18)  SpO2: 99% (25 Oct 2022 10:00) (97% - 100%)    Parameters below as of 25 Oct 2022 10:00  Patient On (Oxygen Delivery Method): room air      Daily     Daily     LABS:                          8.1    4.87  )-----------( 159      ( 25 Oct 2022 07:20 )             25.7     Mean Cell Volume: 75.1 fL (10-25-22 @ 07:20)    10-25    137  |  103  |  5<L>  ----------------------------<  232<H>  3.2<L>   |  22  |  0.56    Ca    8.1<L>      25 Oct 2022 07:20  Phos  2.2     10-25  Mg     2.00     10-25                                    8.1    4.87  )-----------( 159      ( 25 Oct 2022 07:20 )             25.7                         7.8    4.24  )-----------( 115      ( 23 Oct 2022 07:20 )             24.4     Imaging:    CT Abdomen and Pelvis w/ IV Cont (10.22.22 @ 01:27)     FINDINGS:  Lower chest: Stable small anterior peridiaphragmatic lymph nodes.    Liver: Normal. No mass.  Gallbladder and bile ducts: Cholecystectomy. No biliary ductal dilatation.  Pancreas: Status post Whipple procedure with stable and unremarkable   appearing pancreatic remnant.  Spleen: Normal. No splenomegaly.  Adrenal glands: Normal. No mass.  Kidneys and ureters: Normal. No hydronephrosis.  Stomach and bowel: Status post gastrojejunostomy as per Whipple   procedure. No bowel wall thickening or obstruction.  Appendix: No evidence of appendicitis.    Intraperitoneal space: Unremarkable. No free air. No significant fluid  collection.  Vasculature:  No abdominal aortic aneurysm.  Urinary bladder: Within normal limits.  Reproductive: Within normal limits.  Bones/joints: No acute fracture.  Soft tissues: Postop changes in the abdominal wall.    IMPRESSION:  No acute findings or cause for symptoms identified.  Status post Whipple procedure with expected operative changes.       MR Abdomen w/ IV Cont (07.21.22 @ 19:46)   LOWER CHEST: Clustered prominent bilateral cardiophrenic lymph nodes   measuring up to 1.5 x 0.8 cm on the right (12:10), previously measurement   1.5 x 0.8 cm.  Stable approximately 1 cm nodular enhancement mediallyat the right   posterior costophrenic sulcus    LIVER: Cirrhosis. No focal hepatic lesions.  BILE DUCTS: Post hepaticojejunostomy. Mild intrahepatic bile duct   dilation.  GALLBLADDER: Cholecystectomy.  SPLEEN: Within normal limits.  PANCREAS: Whipple procedure. Couple scattered pulmonary side branch ducts   measuring under 5 mm. No main duct dilation.  ADRENALS: Within normal limits.  KIDNEYS/URETERS: Symmetric renal enhancement without hydronephrosis.    VISUALIZED PORTIONS:  BOWEL: Postoperative change. Unobstructed bowel. Large fecal load.   Appendix is normal.  PERITONEUM: No ascites.  VESSELS: Within normal limits.  RETROPERITONEUM/LYMPH NODES: No lymphadenopathy.  ABDOMINAL WALL: Remote anterior abdominal wall postoperative changes.   Resolved anterior abdominal wall collection  BONES: Multilevel degenerative changes throughout the spine.    IMPRESSION:    Stable prominent right cardiophrenic lymph nodes and probably pleural   nodular enhancement medially at the posterior right costophrenic sulcus    Stable Whipple postoperative changes. No imaging evidence of locally   recurrent or metastatic disease involving the abdomen    Cirrhosis. No imaging evidence of portal hypertensive change             Chief Complaint:  Consult for Odynophagia/Dysphagia      HPI:     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 pHTN (dx on imaging 7/21/22), ventral hernia repair cb left inferior epigastric pseudoaneurysm, T2DM a1c 7.4%, HTN presenting with fever and chills found to have ESBL E coli UTI with GI consulted for odynophagia/dysphagia. patient undergoing abraxane/gemzar chemotherapy reinitiated since 8/2022 last dose of chemotherapy 10/11. Has undergone 15 sessions of radiation therapy targeting left lung last session 10/14 with no further sessions planned per daughter at bedside. Of note with recent admission 7/2022 with YUNI opacity identified and mets to lymph nodes thought to be pancreatobiliary in origin. She has been experiencing a stuck feeling in her chest "like a tightness" when eating and then food will eventually pass. Additionally with odynophagia associated with both solids and liquids that is acutely worse in the past week. noted to have previous odynophagia requiring sucralfate/lidocaine/oxycodone and then magic mouth wash with which the patient was adherent until 10/21/22. Additionally takes PPI outpatient and an OTC medication for dyspepsia which provided some relief of her symptoms. With prior oral candidate requiring nystatin for which she would swish and spit rather than swallow due to esophageal discomfort. She has a chronic cough for which she was prescribed robitussin/guaifenesin  and codeine syrup previously and daughter states the only time patient with hemoptysis was during periods of frequent coughing and is without recent hemoptysis or hematemesis. Daughter at bedside states no known history of other esophageal infection such as CMV. She states last known patient weight was 103lbs on admission. Eats typical [place] food prepared by daughter with many spices but not spicy. Per discussion with primary team, patient, and daughter at bedside, patient has not been experiencing any diarrhea. No hematochezia or melena.       Patient is Portuguese speaking and daughter provider translation.     Rad onc Dr. Jesus Ely [facility]    Last Colonoscopy: 10/2021 Non-bleeding internal hemorrhoids. Two 2 to 3 mm polyps in the transverse colon and at the hepatic flexure, removed with a jumbo cold forceps. Resected and retrieved.One 7 mm polyp in the transverse colon, removed with a cold snare. Resected and retrieved.    Last Endoscopy: ERCP 2019  A medium-sized fungating mass was found at the major papilla. One stent was removed from the biliary tree.One plastic stent was placed into the common bile duct.One stent was removedfrom the biliary tree.  The major papilla appeared to have a mass.      Allergies:  No Known Allergies      Home Medications:    Hospital Medications:  acetaminophen     Tablet .. 650 milliGRAM(s) Oral every 6 hours PRN  atorvastatin 10 milliGRAM(s) Oral at bedtime  bisacodyl 5 milliGRAM(s) Oral daily PRN  cholecalciferol 5000 Unit(s) Oral daily  dextrose 50% Injectable 25 Gram(s) IV Push once  dextrose 50% Injectable 12.5 Gram(s) IV Push once  dextrose 50% Injectable 25 Gram(s) IV Push once  dextrose Oral Gel 15 Gram(s) Oral once  enoxaparin Injectable 40 milliGRAM(s) SubCutaneous every 24 hours  gabapentin 300 milliGRAM(s) Oral two times a day  glucagon  Injectable 1 milliGRAM(s) IntraMuscular once  guaiFENesin Oral Liquid (Sugar-Free) 100 milliGRAM(s) Oral every 6 hours PRN  insulin lispro (ADMELOG) corrective regimen sliding scale   SubCutaneous three times a day before meals  insulin lispro (ADMELOG) corrective regimen sliding scale   SubCutaneous at bedtime  lactated ringers. 1000 milliLiter(s) IV Continuous <Continuous>  montelukast 10 milliGRAM(s) Oral at bedtime  morphine  - Injectable 2 milliGRAM(s) IV Push every 4 hours PRN  morphine  - Injectable 4 milliGRAM(s) IV Push every 4 hours PRN  multivitamin 1 Tablet(s) Oral daily  naloxone Injectable 0.4 milliGRAM(s) IV Push once  ondansetron   Disintegrating Tablet 4 milliGRAM(s) Oral every 8 hours  pantoprazole   Suspension 40 milliGRAM(s) Oral before breakfast  piperacillin/tazobactam IVPB.. 3.375 Gram(s) IV Intermittent every 8 hours  polyethylene glycol 3350 17 Gram(s) Oral daily  senna 2 Tablet(s) Oral at bedtime  sucralfate 1 Gram(s) Oral two times a day      PMHX/PSHX:  No pertinent past medical history    Essential hypertension    Gastroesophageal reflux disease without esophagitis    Mild intermittent asthma without complication    Arthritis    Neuropathy    Type 2 diabetes mellitus without complication, without long-term current use of insulin    Adenocarcinoma of gallbladder    Cancer of ampulla of Vater    No significant past surgical history    H/O Whipple procedure    Hemorrhage of gastroduodenal artery    H/O drainage of abscess    H/O eye surgery    Admission for chemotherapy        Family history:  No pertinent family history in first degree relatives    Family history of diabetes mellitus (DM) (Sibling)    FH: HTN (hypertension) (Sibling)        Social History:     ROS:   General:  No fevers, chills or night sweats.  ENT:  +dysphagia and odynophagia  CV:  No pain or palpitations  Resp:  No dyspnea, cough, wheezing  GI:  +epigastric tenderness, No nausea, No vomiting, No diarrhea, No constipation, No weight loss, No pruritis, No rectal bleeding, No tarry stools  Skin:  No rash or edema      PHYSICAL EXAM:   GENERAL:  fatigued  HEENT: +poor dentition, no oral thrush,  NC/AT,  conjunctivae clear and pink, sclera -anicteric  CHEST:  CTA B/L, Normal effort  HEART:  RRR S1/S2, No murmurs  ABDOMEN:  +mild epigastric tenderness to palpation, Soft, non-distended, normoactive bowel sounds,  no masses ,no hepato-splenomegaly, no signs of chronic liver disease  EXTEREMITIES:  No cyanosis or Edema  SKIN:  Warm & Dry. No rash or erythema  NEURO:  Alert, oriented    Vital Signs:  Vital Signs Last 24 Hrs  T(C): 36.8 (25 Oct 2022 10:00), Max: 38.3 (24 Oct 2022 19:38)  T(F): 98.2 (25 Oct 2022 10:00), Max: 101 (24 Oct 2022 19:38)  HR: 75 (25 Oct 2022 10:00) (74 - 95)  BP: 113/72 (25 Oct 2022 10:00) (104/63 - 115/64)  BP(mean): --  RR: 18 (25 Oct 2022 10:00) (18 - 18)  SpO2: 99% (25 Oct 2022 10:00) (97% - 100%)    Parameters below as of 25 Oct 2022 10:00  Patient On (Oxygen Delivery Method): room air      Daily     Daily     LABS:                          8.1    4.87  )-----------( 159      ( 25 Oct 2022 07:20 )             25.7     Mean Cell Volume: 75.1 fL (10-25-22 @ 07:20)    10-25    137  |  103  |  5<L>  ----------------------------<  232<H>  3.2<L>   |  22  |  0.56    Ca    8.1<L>      25 Oct 2022 07:20  Phos  2.2     10-25  Mg     2.00     10-25                                    8.1    4.87  )-----------( 159      ( 25 Oct 2022 07:20 )             25.7                         7.8    4.24  )-----------( 115      ( 23 Oct 2022 07:20 )             24.4     Imaging:    CT Abdomen and Pelvis w/ IV Cont (10.22.22 @ 01:27)     FINDINGS:  Lower chest: Stable small anterior peridiaphragmatic lymph nodes.    Liver: Normal. No mass.  Gallbladder and bile ducts: Cholecystectomy. No biliary ductal dilatation.  Pancreas: Status post Whipple procedure with stable and unremarkable   appearing pancreatic remnant.  Spleen: Normal. No splenomegaly.  Adrenal glands: Normal. No mass.  Kidneys and ureters: Normal. No hydronephrosis.  Stomach and bowel: Status post gastrojejunostomy as per Whipple   procedure. No bowel wall thickening or obstruction.  Appendix: No evidence of appendicitis.    Intraperitoneal space: Unremarkable. No free air. No significant fluid  collection.  Vasculature:  No abdominal aortic aneurysm.  Urinary bladder: Within normal limits.  Reproductive: Within normal limits.  Bones/joints: No acute fracture.  Soft tissues: Postop changes in the abdominal wall.    IMPRESSION:  No acute findings or cause for symptoms identified.  Status post Whipple procedure with expected operative changes.       MR Abdomen w/ IV Cont (07.21.22 @ 19:46)   LOWER CHEST: Clustered prominent bilateral cardiophrenic lymph nodes   measuring up to 1.5 x 0.8 cm on the right (12:10), previously measurement   1.5 x 0.8 cm.  Stable approximately 1 cm nodular enhancement mediallyat the right   posterior costophrenic sulcus    LIVER: Cirrhosis. No focal hepatic lesions.  BILE DUCTS: Post hepaticojejunostomy. Mild intrahepatic bile duct   dilation.  GALLBLADDER: Cholecystectomy.  SPLEEN: Within normal limits.  PANCREAS: Whipple procedure. Couple scattered pulmonary side branch ducts   measuring under 5 mm. No main duct dilation.  ADRENALS: Within normal limits.  KIDNEYS/URETERS: Symmetric renal enhancement without hydronephrosis.    VISUALIZED PORTIONS:  BOWEL: Postoperative change. Unobstructed bowel. Large fecal load.   Appendix is normal.  PERITONEUM: No ascites.  VESSELS: Within normal limits.  RETROPERITONEUM/LYMPH NODES: No lymphadenopathy.  ABDOMINAL WALL: Remote anterior abdominal wall postoperative changes.   Resolved anterior abdominal wall collection  BONES: Multilevel degenerative changes throughout the spine.    IMPRESSION:    Stable prominent right cardiophrenic lymph nodes and probably pleural   nodular enhancement medially at the posterior right costophrenic sulcus    Stable Whipple postoperative changes. No imaging evidence of locally   recurrent or metastatic disease involving the abdomen    Cirrhosis. No imaging evidence of portal hypertensive change             Chief Complaint:  Consult for Odynophagia/Dysphagia      HPI:     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 pHTN (dx on imaging 7/21/22), ventral hernia repair cb left inferior epigastric pseudoaneurysm, T2DM a1c 7.4%, HTN presenting with fever and chills found to have ESBL E coli UTI with GI consulted for odynophagia/dysphagia. patient undergoing abraxane/gemzar chemotherapy reinitiated since 8/2022 last dose of chemotherapy 10/11. Has undergone 15 sessions of radiation therapy targeting left lung last session 10/14 with no further sessions planned per daughter at bedside. Of note with recent admission 7/2022 with YUNI opacity identified and mets to lymph nodes thought to be pancreatobiliary in origin. She has been experiencing a stuck feeling in her chest "like a tightness" when eating and then food will eventually pass. Additionally with odynophagia associated with both solids and liquids that is acutely worse in the past week. noted to have previous odynophagia requiring sucralfate/lidocaine/oxycodone and then magic mouth wash with which the patient was adherent until 10/21/22. Additionally takes PPI outpatient and an OTC medication for dyspepsia which provided some relief of her symptoms. With prior oral candidate requiring nystatin for which she would swish and spit rather than swallow due to esophageal discomfort. She has a chronic cough for which she was prescribed robitussin/guaifenesin  and codeine syrup previously and daughter states the only time patient with hemoptysis was during periods of frequent coughing and is without recent hemoptysis or hematemesis. Daughter at bedside states no known history of other esophageal infection such as CMV. She states last known patient weight was 103lbs on admission. Eats typical Persian food prepared by daughter with many spices but not spicy. Per discussion with primary team, patient, and daughter at bedside, patient has not been experiencing any diarrhea. No hematochezia or melena.       Patient is Persian speaking and daughter provider translation.     Rad onc Dr. Jesus [facility] Raphael Payan    Last Colonoscopy: 10/2021 Non-bleeding internal hemorrhoids. Two 2 to 3 mm polyps in the transverse colon and at the hepatic flexure, removed with a jumbo cold forceps. Resected and retrieved.One 7 mm polyp in the transverse colon, removed with a cold snare. Resected and retrieved.    Last Endoscopy: ERCP 2019  A medium-sized fungating mass was found at the major papilla. One stent was removed from the biliary tree.One plastic stent was placed into the common bile duct.One stent was removedfrom the biliary tree.  The major papilla appeared to have a mass.      Allergies:  No Known Allergies      Home Medications:    Hospital Medications:  acetaminophen     Tablet .. 650 milliGRAM(s) Oral every 6 hours PRN  atorvastatin 10 milliGRAM(s) Oral at bedtime  bisacodyl 5 milliGRAM(s) Oral daily PRN  cholecalciferol 5000 Unit(s) Oral daily  dextrose 50% Injectable 25 Gram(s) IV Push once  dextrose 50% Injectable 12.5 Gram(s) IV Push once  dextrose 50% Injectable 25 Gram(s) IV Push once  dextrose Oral Gel 15 Gram(s) Oral once  enoxaparin Injectable 40 milliGRAM(s) SubCutaneous every 24 hours  gabapentin 300 milliGRAM(s) Oral two times a day  glucagon  Injectable 1 milliGRAM(s) IntraMuscular once  guaiFENesin Oral Liquid (Sugar-Free) 100 milliGRAM(s) Oral every 6 hours PRN  insulin lispro (ADMELOG) corrective regimen sliding scale   SubCutaneous three times a day before meals  insulin lispro (ADMELOG) corrective regimen sliding scale   SubCutaneous at bedtime  lactated ringers. 1000 milliLiter(s) IV Continuous <Continuous>  montelukast 10 milliGRAM(s) Oral at bedtime  morphine  - Injectable 2 milliGRAM(s) IV Push every 4 hours PRN  morphine  - Injectable 4 milliGRAM(s) IV Push every 4 hours PRN  multivitamin 1 Tablet(s) Oral daily  naloxone Injectable 0.4 milliGRAM(s) IV Push once  ondansetron   Disintegrating Tablet 4 milliGRAM(s) Oral every 8 hours  pantoprazole   Suspension 40 milliGRAM(s) Oral before breakfast  piperacillin/tazobactam IVPB.. 3.375 Gram(s) IV Intermittent every 8 hours  polyethylene glycol 3350 17 Gram(s) Oral daily  senna 2 Tablet(s) Oral at bedtime  sucralfate 1 Gram(s) Oral two times a day      PMHX/PSHX:  No pertinent past medical history    Essential hypertension    Gastroesophageal reflux disease without esophagitis    Mild intermittent asthma without complication    Arthritis    Neuropathy    Type 2 diabetes mellitus without complication, without long-term current use of insulin    Adenocarcinoma of gallbladder    Cancer of ampulla of Vater    No significant past surgical history    H/O Whipple procedure    Hemorrhage of gastroduodenal artery    H/O drainage of abscess    H/O eye surgery    Admission for chemotherapy        Family history:  No pertinent family history in first degree relatives    Family history of diabetes mellitus (DM) (Sibling)    FH: HTN (hypertension) (Sibling)        Social History:     ROS:   General:  No fevers, chills or night sweats.  ENT:  +dysphagia and odynophagia  CV:  No pain or palpitations  Resp:  No dyspnea, cough, wheezing  GI:  +epigastric tenderness, No nausea, No vomiting, No diarrhea, No constipation, No weight loss, No pruritis, No rectal bleeding, No tarry stools  Skin:  No rash or edema      PHYSICAL EXAM:   GENERAL:  fatigued  HEENT: +poor dentition, no oral thrush,  NC/AT,  conjunctivae clear and pink, sclera -anicteric  CHEST: R chest wall port not accessed and without tenderness or erythema,  CTA B/L, Normal effort  HEART:  RRR S1/S2, No murmurs  ABDOMEN:  +mild epigastric tenderness to palpation, healed midline surgical incision, Soft, non-distended, normoactive bowel sounds,  no masses ,no hepato-splenomegaly, no signs of chronic liver disease  EXTEREMITIES:  No cyanosis or Edema  SKIN:  Warm & Dry. No rash or erythema  NEURO:  Alert, oriented    Vital Signs:  Vital Signs Last 24 Hrs  T(C): 36.8 (25 Oct 2022 10:00), Max: 38.3 (24 Oct 2022 19:38)  T(F): 98.2 (25 Oct 2022 10:00), Max: 101 (24 Oct 2022 19:38)  HR: 75 (25 Oct 2022 10:00) (74 - 95)  BP: 113/72 (25 Oct 2022 10:00) (104/63 - 115/64)  BP(mean): --  RR: 18 (25 Oct 2022 10:00) (18 - 18)  SpO2: 99% (25 Oct 2022 10:00) (97% - 100%)    Parameters below as of 25 Oct 2022 10:00  Patient On (Oxygen Delivery Method): room air      Daily     Daily     LABS:                          8.1    4.87  )-----------( 159      ( 25 Oct 2022 07:20 )             25.7     Mean Cell Volume: 75.1 fL (10-25-22 @ 07:20)    10-25    137  |  103  |  5<L>  ----------------------------<  232<H>  3.2<L>   |  22  |  0.56    Ca    8.1<L>      25 Oct 2022 07:20  Phos  2.2     10-25  Mg     2.00     10-25                                    8.1    4.87  )-----------( 159      ( 25 Oct 2022 07:20 )             25.7                         7.8    4.24  )-----------( 115      ( 23 Oct 2022 07:20 )             24.4     Imaging:    CT Abdomen and Pelvis w/ IV Cont (10.22.22 @ 01:27)     FINDINGS:  Lower chest: Stable small anterior peridiaphragmatic lymph nodes.    Liver: Normal. No mass.  Gallbladder and bile ducts: Cholecystectomy. No biliary ductal dilatation.  Pancreas: Status post Whipple procedure with stable and unremarkable   appearing pancreatic remnant.  Spleen: Normal. No splenomegaly.  Adrenal glands: Normal. No mass.  Kidneys and ureters: Normal. No hydronephrosis.  Stomach and bowel: Status post gastrojejunostomy as per Whipple   procedure. No bowel wall thickening or obstruction.  Appendix: No evidence of appendicitis.    Intraperitoneal space: Unremarkable. No free air. No significant fluid  collection.  Vasculature:  No abdominal aortic aneurysm.  Urinary bladder: Within normal limits.  Reproductive: Within normal limits.  Bones/joints: No acute fracture.  Soft tissues: Postop changes in the abdominal wall.    IMPRESSION:  No acute findings or cause for symptoms identified.  Status post Whipple procedure with expected operative changes.       MR Abdomen w/ IV Cont (07.21.22 @ 19:46)   LOWER CHEST: Clustered prominent bilateral cardiophrenic lymph nodes   measuring up to 1.5 x 0.8 cm on the right (12:10), previously measurement   1.5 x 0.8 cm.  Stable approximately 1 cm nodular enhancement mediallyat the right   posterior costophrenic sulcus    LIVER: Cirrhosis. No focal hepatic lesions.  BILE DUCTS: Post hepaticojejunostomy. Mild intrahepatic bile duct   dilation.  GALLBLADDER: Cholecystectomy.  SPLEEN: Within normal limits.  PANCREAS: Whipple procedure. Couple scattered pulmonary side branch ducts   measuring under 5 mm. No main duct dilation.  ADRENALS: Within normal limits.  KIDNEYS/URETERS: Symmetric renal enhancement without hydronephrosis.    VISUALIZED PORTIONS:  BOWEL: Postoperative change. Unobstructed bowel. Large fecal load.   Appendix is normal.  PERITONEUM: No ascites.  VESSELS: Within normal limits.  RETROPERITONEUM/LYMPH NODES: No lymphadenopathy.  ABDOMINAL WALL: Remote anterior abdominal wall postoperative changes.   Resolved anterior abdominal wall collection  BONES: Multilevel degenerative changes throughout the spine.    IMPRESSION:    Stable prominent right cardiophrenic lymph nodes and probably pleural   nodular enhancement medially at the posterior right costophrenic sulcus    Stable Whipple postoperative changes. No imaging evidence of locally   recurrent or metastatic disease involving the abdomen    Cirrhosis. No imaging evidence of portal hypertensive change             Chief Complaint:  Consult for Odynophagia/Dysphagia      HPI:     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 pHTN (dx on imaging 7/21/22), ventral hernia repair cb left inferior epigastric pseudoaneurysm, T2DM a1c 7.4%, HTN presenting with fever and chills found to have ESBL E coli UTI with GI consulted for odynophagia/dysphagia. patient undergoing abraxane/gemzar chemotherapy reinitiated since 8/2022 last dose of chemotherapy 10/11. Has undergone 15 sessions of radiation therapy targeting left lung last session 10/14 with no further sessions planned per daughter at bedside. Of note with recent admission 7/2022 with YUNI opacity identified and mets to lymph nodes thought to be pancreatobiliary in origin. She has been experiencing a stuck feeling in her chest "like a tightness" when eating and then food will eventually pass. Additionally with odynophagia associated with both solids and liquids that is acutely worse in the past week. noted to have previous odynophagia requiring sucralfate/lidocaine/oxycodone and then magic mouth wash with which the patient was adherent until 10/21/22. Additionally takes PPI outpatient and an OTC medication for dyspepsia which provided some relief of her symptoms. With prior oral candidate requiring nystatin for which she would swish and spit rather than swallow due to esophageal discomfort. She has a chronic cough for which she was prescribed robitussin/guaifenesin  and codeine syrup previously and daughter states the only time patient with hemoptysis was during periods of frequent coughing and is without recent hemoptysis or hematemesis. Daughter at bedside states no known history of other esophageal infection such as CMV. She states last known patient weight was 103lbs on admission. Eats typical Maori food prepared by daughter with many spices but not spicy. Per discussion with primary team, patient, and daughter at bedside, patient has not been experiencing any diarrhea. No hematochezia or melena.       Patient is Maori speaking and daughter provider translation.     Rad onc Dr. Jesus [facility] Raphael Payan    Last Colonoscopy: 10/2021 Non-bleeding internal hemorrhoids. Two 2 to 3 mm polyps in the transverse colon and at the hepatic flexure, removed with a jumbo cold forceps. Resected and retrieved.One 7 mm polyp in the transverse colon, removed with a cold snare. Resected and retrieved.    Last Endoscopy: ERCP 2019  A medium-sized fungating mass was found at the major papilla. One stent was removed from the biliary tree.One plastic stent was placed into the common bile duct.One stent was removedfrom the biliary tree.  The major papilla appeared to have a mass.      Allergies:  No Known Allergies      Home Medications:    Hospital Medications:  acetaminophen     Tablet .. 650 milliGRAM(s) Oral every 6 hours PRN  atorvastatin 10 milliGRAM(s) Oral at bedtime  bisacodyl 5 milliGRAM(s) Oral daily PRN  cholecalciferol 5000 Unit(s) Oral daily  dextrose 50% Injectable 25 Gram(s) IV Push once  dextrose 50% Injectable 12.5 Gram(s) IV Push once  dextrose 50% Injectable 25 Gram(s) IV Push once  dextrose Oral Gel 15 Gram(s) Oral once  enoxaparin Injectable 40 milliGRAM(s) SubCutaneous every 24 hours  gabapentin 300 milliGRAM(s) Oral two times a day  glucagon  Injectable 1 milliGRAM(s) IntraMuscular once  guaiFENesin Oral Liquid (Sugar-Free) 100 milliGRAM(s) Oral every 6 hours PRN  insulin lispro (ADMELOG) corrective regimen sliding scale   SubCutaneous three times a day before meals  insulin lispro (ADMELOG) corrective regimen sliding scale   SubCutaneous at bedtime  lactated ringers. 1000 milliLiter(s) IV Continuous <Continuous>  montelukast 10 milliGRAM(s) Oral at bedtime  morphine  - Injectable 2 milliGRAM(s) IV Push every 4 hours PRN  morphine  - Injectable 4 milliGRAM(s) IV Push every 4 hours PRN  multivitamin 1 Tablet(s) Oral daily  naloxone Injectable 0.4 milliGRAM(s) IV Push once  ondansetron   Disintegrating Tablet 4 milliGRAM(s) Oral every 8 hours  pantoprazole   Suspension 40 milliGRAM(s) Oral before breakfast  piperacillin/tazobactam IVPB.. 3.375 Gram(s) IV Intermittent every 8 hours  polyethylene glycol 3350 17 Gram(s) Oral daily  senna 2 Tablet(s) Oral at bedtime  sucralfate 1 Gram(s) Oral two times a day      PMHX/PSHX:  No pertinent past medical history    Essential hypertension    Gastroesophageal reflux disease without esophagitis    Mild intermittent asthma without complication    Arthritis    Neuropathy    Type 2 diabetes mellitus without complication, without long-term current use of insulin    Adenocarcinoma of gallbladder    Cancer of ampulla of Vater    No significant past surgical history    H/O Whipple procedure    Hemorrhage of gastroduodenal artery    H/O drainage of abscess    H/O eye surgery    Admission for chemotherapy        Family history:  No pertinent family history in first degree relatives    Family history of diabetes mellitus (DM) (Sibling)    FH: HTN (hypertension) (Sibling)        Social History:     ROS:   General:  No fevers, chills or night sweats.  ENT:  +dysphagia and odynophagia  CV:  No pain or palpitations  Resp:  No dyspnea, cough, wheezing  GI:  +epigastric tenderness, No nausea, No vomiting, No diarrhea, No constipation, No weight loss, No pruritis, No rectal bleeding, No tarry stools  Skin:  No rash or edema      PHYSICAL EXAM:   GENERAL:  fatigued  HEENT: +poor dentition, mucositis noted dorsal aspect of tongue, lateral aspect tongue thrush noted.  NC/AT,  conjunctivae clear and pink, sclera -anicteric  CHEST: R chest wall port not accessed and without tenderness or erythema,  CTA B/L, Normal effort  HEART:  RRR S1/S2, No murmurs  ABDOMEN:  +mild epigastric tenderness to palpation, healed midline surgical incision, Soft, non-distended, normoactive bowel sounds,  no masses ,no hepato-splenomegaly, no signs of chronic liver disease  EXTEREMITIES:  No cyanosis or Edema  SKIN:  Warm & Dry. No rash or erythema  NEURO:  Alert, oriented    Vital Signs:  Vital Signs Last 24 Hrs  T(C): 36.8 (25 Oct 2022 10:00), Max: 38.3 (24 Oct 2022 19:38)  T(F): 98.2 (25 Oct 2022 10:00), Max: 101 (24 Oct 2022 19:38)  HR: 75 (25 Oct 2022 10:00) (74 - 95)  BP: 113/72 (25 Oct 2022 10:00) (104/63 - 115/64)  BP(mean): --  RR: 18 (25 Oct 2022 10:00) (18 - 18)  SpO2: 99% (25 Oct 2022 10:00) (97% - 100%)    Parameters below as of 25 Oct 2022 10:00  Patient On (Oxygen Delivery Method): room air      Daily     Daily     LABS:                          8.1    4.87  )-----------( 159      ( 25 Oct 2022 07:20 )             25.7     Mean Cell Volume: 75.1 fL (10-25-22 @ 07:20)    10-25    137  |  103  |  5<L>  ----------------------------<  232<H>  3.2<L>   |  22  |  0.56    Ca    8.1<L>      25 Oct 2022 07:20  Phos  2.2     10-25  Mg     2.00     10-25                                    8.1    4.87  )-----------( 159      ( 25 Oct 2022 07:20 )             25.7                         7.8    4.24  )-----------( 115      ( 23 Oct 2022 07:20 )             24.4     Imaging:    CT Abdomen and Pelvis w/ IV Cont (10.22.22 @ 01:27)     FINDINGS:  Lower chest: Stable small anterior peridiaphragmatic lymph nodes.    Liver: Normal. No mass.  Gallbladder and bile ducts: Cholecystectomy. No biliary ductal dilatation.  Pancreas: Status post Whipple procedure with stable and unremarkable   appearing pancreatic remnant.  Spleen: Normal. No splenomegaly.  Adrenal glands: Normal. No mass.  Kidneys and ureters: Normal. No hydronephrosis.  Stomach and bowel: Status post gastrojejunostomy as per Whipple   procedure. No bowel wall thickening or obstruction.  Appendix: No evidence of appendicitis.    Intraperitoneal space: Unremarkable. No free air. No significant fluid  collection.  Vasculature:  No abdominal aortic aneurysm.  Urinary bladder: Within normal limits.  Reproductive: Within normal limits.  Bones/joints: No acute fracture.  Soft tissues: Postop changes in the abdominal wall.    IMPRESSION:  No acute findings or cause for symptoms identified.  Status post Whipple procedure with expected operative changes.       MR Abdomen w/ IV Cont (07.21.22 @ 19:46)   LOWER CHEST: Clustered prominent bilateral cardiophrenic lymph nodes   measuring up to 1.5 x 0.8 cm on the right (12:10), previously measurement   1.5 x 0.8 cm.  Stable approximately 1 cm nodular enhancement mediallyat the right   posterior costophrenic sulcus    LIVER: Cirrhosis. No focal hepatic lesions.  BILE DUCTS: Post hepaticojejunostomy. Mild intrahepatic bile duct   dilation.  GALLBLADDER: Cholecystectomy.  SPLEEN: Within normal limits.  PANCREAS: Whipple procedure. Couple scattered pulmonary side branch ducts   measuring under 5 mm. No main duct dilation.  ADRENALS: Within normal limits.  KIDNEYS/URETERS: Symmetric renal enhancement without hydronephrosis.    VISUALIZED PORTIONS:  BOWEL: Postoperative change. Unobstructed bowel. Large fecal load.   Appendix is normal.  PERITONEUM: No ascites.  VESSELS: Within normal limits.  RETROPERITONEUM/LYMPH NODES: No lymphadenopathy.  ABDOMINAL WALL: Remote anterior abdominal wall postoperative changes.   Resolved anterior abdominal wall collection  BONES: Multilevel degenerative changes throughout the spine.    IMPRESSION:    Stable prominent right cardiophrenic lymph nodes and probably pleural   nodular enhancement medially at the posterior right costophrenic sulcus    Stable Whipple postoperative changes. No imaging evidence of locally   recurrent or metastatic disease involving the abdomen    Cirrhosis. No imaging evidence of portal hypertensive change

## 2022-10-25 NOTE — PROGRESS NOTE ADULT - PROBLEM SELECTOR PLAN 2
Odynophagia with dysphagia to solids and liquids 2/2 pain. 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 Tylenol does not help to relieve the pain. Morphine 4mg IV made her dizzy.  - C/w morphine 2mg IV q4 PRN   - S&S consult   - Soft diet for now   - Viral work up   - GI consult to evaluate for endoscopy to diagnose etiology for pain with swallowing and abdominal pain

## 2022-10-25 NOTE — PROGRESS NOTE ADULT - PROBLEM SELECTOR PLAN 1
Sepsis 2/2 UTI. UA positive. Febrile to 101 overnight. No leukocytosis. Pt. is immunocompromised in the setting of malignancy, chemo and radiation. CTAP also showing colitis and gastritis however clinically does not seem to have GI infection (no diarrhea, nausea/vomiting). Fever curve is downtrending. There is a possibility fevers could be related to aspiration events given dysphagia and pain with swallowing?   - UCx with E.coli resistant to cephalosporins. Given, ongoing fever will continue to treat with broad spectrum antibiotics (zosyn).   - Repeat CXR   - Repeat RVP   - If spikes another fever, plan to repeat BCx.   - If fever continues - will consider ID consult

## 2022-10-25 NOTE — CONSULT NOTE ADULT - ASSESSMENT
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 pHTN (dx on imaging 7/21/22), ventral hernia repair cb left inferior epigastric pseudoaneurysm, T2DM a1c 7.4%, HTN presenting with fever and chills found to have ESBL E coli UTI with GI consulted for odynophagia/dysphagia    ***PENDING FINAL DISCUSSION WITH ATTENDING***    NON-URGENT CONSULTS:  Please email [email or web address]  MONDAY-FRIDAY 8AM-5PM: Pager# [phone]  AT NIGHT AND ON WEEKENDS:  Contact on-call GI fellow via answering service ([phone]) from 5pm-8am and on weekends/holidays   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 pHTN (dx on imaging 7/21/22), ventral hernia repair cb left inferior epigastric pseudoaneurysm, T2DM a1c 7.4%, HTN presenting with fever and chills found to have ESBL E coli UTI with GI consulted for odynophagia/dysphagia differential including radiation esophagitis versus infectious esophagitis including CMV, HSV, Candida.    Recommendations   -Tentative plan for EGD Thursday 10/27 with possible biopsy.  Awaiting for patient to defervesce given last febrile 10/24 PM. Negative blood cultures from 10/22 appreciated.   -PO diet as tolerated for now.   -further management dependent on results obtained from EGD.     Discussed with Attending. Communicated to primary team.      NON-URGENT CONSULTS:  Please email [email or web address]  MONDAY-FRIDAY 8AM-5PM: Pager# [phone]  AT NIGHT AND ON WEEKENDS:  Contact on-call GI fellow via answering service ([phone]) from 5pm-8am and on weekends/holidays   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 pHTN (dx on imaging 7/21/22), ventral hernia repair cb left inferior epigastric pseudoaneurysm, T2DM a1c 7.4%, HTN presenting with fever and chills found to have ESBL E coli UTI with GI consulted for odynophagia/dysphagia differential including radiation esophagitis versus infectious esophagitis including CMV, HSV, Candida.    Recommendations   -Tentative plan for EGD Thursday 10/27 with possible biopsy.  Awaiting for patient to defervesce given last febrile 10/24 PM. Negative blood cultures from 10/22 appreciated.   -PO diet as tolerated for now.   -further management dependent on results obtained from EGD.   -Discussed plan for EGD with EP regarding any special considerations during the procedure. Appreciate recommendations to have pads in place and atropine at the bedside. If any further recommendations our team remains available.     Discussed with Attending. Communicated to primary team.      NON-URGENT CONSULTS:  Please email [email or web address]  MONDAY-FRIDAY 8AM-5PM: Pager# [phone]  AT NIGHT AND ON WEEKENDS:  Contact on-call GI fellow via answering service ([phone]) from 5pm-8am and on weekends/holidays   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 pHTN (dx on imaging 7/21/22), ventral hernia repair cb left inferior epigastric pseudoaneurysm, T2DM a1c 7.4%, HTN presenting with fever and chills found to have ESBL E coli UTI with GI consulted for odynophagia/dysphagia differential including radiation esophagitis versus infectious esophagitis including CMV, HSV, Candida.    Recommendations   -Tentative plan for EGD Thursday 10/27 with possible biopsy.  Awaiting for patient to defervesce given last febrile 10/24 PM. Negative blood cultures from 10/22 appreciated.   -PO diet as tolerated for now.   -further management dependent on results obtained from EGD.       Discussed with Attending. Communicated to primary team.      NON-URGENT CONSULTS:  Please email [email or web address]  MONDAY-FRIDAY 8AM-5PM: Pager# [phone]  AT NIGHT AND ON WEEKENDS:  Contact on-call GI fellow via answering service ([phone]) from 5pm-8am and on weekends/holidays   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 pHTN (dx on imaging 7/21/22), ventral hernia repair cb left inferior epigastric pseudoaneurysm, T2DM a1c 7.4%, HTN presenting with fever and chills found to have ESBL E coli UTI with GI consulted for odynophagia/dysphagia differential including radiation esophagitis versus infectious esophagitis including CMV, HSV, Candida.    Recommendations   -Tentative plan for EGD Thursday 10/27 with possible biopsy.  Awaiting for patient to defervesce given last febrile 10/24 PM. Negative blood cultures from 10/22 appreciated.   -PO diet as tolerated for now.   -further management dependent on results obtained from EGD.   -Appreciate RVP with COVID ordered.   -Follow up CT Chest given left lung opacity on CXR and appreciate broadened antibiotics.    Discussed with Attending. Communicated to primary team.      NON-URGENT CONSULTS:  Please email [email or web address]  MONDAY-FRIDAY 8AM-5PM: Pager# [phone]  AT NIGHT AND ON WEEKENDS:  Contact on-call GI fellow via answering service ([phone]) from 5pm-8am and on weekends/holidays

## 2022-10-25 NOTE — PHYSICAL THERAPY INITIAL EVALUATION ADULT - ADDITIONAL COMMENTS
Pt lives with her daughter in a house, +10 stairs to enter. prior to admission pt was able to ambulate with supervision with a straight cane and occasionally used a rollator. Pt owns a rolling walker, straight cane and rollator. Pt's daughter is her home health aide/ CDPAP. Pt denies any falls within the last year.     Pt. left comfortable sitting edge of bed, call bell in reach and in NAD, pt's daughter at bedside.

## 2022-10-25 NOTE — PHYSICAL THERAPY INITIAL EVALUATION ADULT - PERTINENT HX OF CURRENT PROBLEM, REHAB EVAL
Pt is a 57 year old female who presented to [facility] with fever, chills and abdominal pain. PMH: metastatic ampullary carcinoma further PMH below.

## 2022-10-25 NOTE — CHART NOTE - NSCHARTNOTEFT_GEN_A_CORE
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   Vital Signs Last 24 Hrs  T(C): 37.1 (25 Oct 2022 05:16), Max: 38.3 (24 Oct 2022 19:38)  T(F): 98.8 (25 Oct 2022 05:16), Max: 101 (24 Oct 2022 19:38)  HR: 74 (25 Oct 2022 05:16) (74 - 95)  BP: 109/70 (25 Oct 2022 05:16) (104/63 - 115/64)  BP(mean): --  RR: 18 (25 Oct 2022 05:16) (18 - 18)  SpO2: 98% (25 Oct 2022 05:16) (97% - 100%)    Parameters below as of 25 Oct 2022 05:16  Patient On (Oxygen Delivery Method): room air                          8.1    4.87  )-----------( 159      ( 25 Oct 2022 07:20 )             25.7     25 Oct 2022 07:20    137    |  103    |  5      ----------------------------<  232    3.2     |  22     |  0.56     Ca    8.1        25 Oct 2022 07:20  Phos  2.2       25 Oct 2022 07:20  Mg     2.00      25 Oct 2022 07:20    will obtain UA, Blood culture x 2 sets  Continue present care.    January CASTLE

## 2022-10-25 NOTE — PROGRESS NOTE ADULT - SUBJECTIVE AND OBJECTIVE BOX
Hospitalist Progress Note  Aimee Barraza MD Pager # [phone]    OVERNIGHT EVENTS: JAILYN     SUBJECTIVE / INTERVAL HPI: Patient seen and examined at bedside. Daughter provides Nepali translation at bedside. Pt. is still reporting significant pain with swallowing. She feels the pain from her throat and down her chest. She is feeling pain in her abdomen as well. She had 1 loose stool yesterday morning. No blood. Morphine 2mg IV helped to relieve the pain but when it wears off the pain comes back. Overnight, she started coughing more. Coughing has become worse since admission. She had a fever overnight.     VITAL SIGNS:  Vital Signs Last 24 Hrs  T(C): 36.8 (25 Oct 2022 10:00), Max: 38.3 (24 Oct 2022 19:38)  T(F): 98.2 (25 Oct 2022 10:00), Max: 101 (24 Oct 2022 19:38)  HR: 75 (25 Oct 2022 10:00) (74 - 95)  BP: 113/72 (25 Oct 2022 10:00) (104/63 - 115/64)  BP(mean): --  RR: 18 (25 Oct 2022 10:00) (18 - 18)  SpO2: 99% (25 Oct 2022 10:00) (97% - 100%)    Parameters below as of 25 Oct 2022 10:00  Patient On (Oxygen Delivery Method): room air        PHYSICAL EXAM:    General: Uncomfortable appearing female resting in bed   HEENT: NC/AT; PERRL, anicteric sclera; MMM - no oropharyngeal lesions, ulcers or plaques observed.   Neck: supple  Cardiovascular: +S1/S2; RRR  Respiratory: CTA B/L; no W/R/R  Gastrointestinal: soft, tender to palpation in the epigastric region, non-distended; +BSx4  Extremities: WWP; no edema, clubbing or cyanosis  Vascular: 2+ radial, DP/PT pulses B/L  Neurological: AAOx3; no focal deficits    MEDICATIONS:  MEDICATIONS  (STANDING):  atorvastatin 10 milliGRAM(s) Oral at bedtime  cholecalciferol 5000 Unit(s) Oral daily  dextrose 50% Injectable 25 Gram(s) IV Push once  dextrose 50% Injectable 12.5 Gram(s) IV Push once  dextrose 50% Injectable 25 Gram(s) IV Push once  dextrose Oral Gel 15 Gram(s) Oral once  enoxaparin Injectable 40 milliGRAM(s) SubCutaneous every 24 hours  gabapentin 300 milliGRAM(s) Oral two times a day  glucagon  Injectable 1 milliGRAM(s) IntraMuscular once  insulin lispro (ADMELOG) corrective regimen sliding scale   SubCutaneous three times a day before meals  insulin lispro (ADMELOG) corrective regimen sliding scale   SubCutaneous at bedtime  lactated ringers. 1000 milliLiter(s) (100 mL/Hr) IV Continuous <Continuous>  montelukast 10 milliGRAM(s) Oral at bedtime  multivitamin 1 Tablet(s) Oral daily  naloxone Injectable 0.4 milliGRAM(s) IV Push once  ondansetron   Disintegrating Tablet 4 milliGRAM(s) Oral every 8 hours  pantoprazole   Suspension 40 milliGRAM(s) Oral before breakfast  piperacillin/tazobactam IVPB.. 3.375 Gram(s) IV Intermittent every 8 hours  polyethylene glycol 3350 17 Gram(s) Oral daily  senna 2 Tablet(s) Oral at bedtime  sucralfate 1 Gram(s) Oral two times a day    MEDICATIONS  (PRN):  acetaminophen     Tablet .. 650 milliGRAM(s) Oral every 6 hours PRN Temp greater or equal to 38C (100.4F), Mild Pain (1 - 3)  bisacodyl 5 milliGRAM(s) Oral daily PRN Constipation  guaiFENesin Oral Liquid (Sugar-Free) 100 milliGRAM(s) Oral every 6 hours PRN Cough  morphine  - Injectable 2 milliGRAM(s) IV Push every 4 hours PRN Moderate Pain (4 - 6)  morphine  - Injectable 4 milliGRAM(s) IV Push every 4 hours PRN Severe Pain (7 - 10)      ALLERGIES:  Allergies    No Known Allergies    Intolerances        LABS:                        8.1    4.87  )-----------( 159      ( 25 Oct 2022 07:20 )             25.7     10-25    137  |  103  |  5<L>  ----------------------------<  232<H>  3.2<L>   |  22  |  0.56    Ca    8.1<L>      25 Oct 2022 07:20  Phos  2.2     10-25  Mg     2.00     10-25          CAPILLARY BLOOD GLUCOSE      POCT Blood Glucose.: 139 mg/dL (25 Oct 2022 12:07)      RADIOLOGY & ADDITIONAL TESTS: Reviewed.    ASSESSMENT:    PLAN:

## 2022-10-26 LAB
ALBUMIN SERPL ELPH-MCNC: 2.5 G/DL — LOW (ref 3.3–5)
ALP SERPL-CCNC: 136 U/L — HIGH (ref 40–120)
ALT FLD-CCNC: 17 U/L — SIGNIFICANT CHANGE UP (ref 4–33)
ANION GAP SERPL CALC-SCNC: 8 MMOL/L — SIGNIFICANT CHANGE UP (ref 7–14)
ANISOCYTOSIS BLD QL: SIGNIFICANT CHANGE UP
AST SERPL-CCNC: 32 U/L — SIGNIFICANT CHANGE UP (ref 4–32)
BASOPHILS # BLD AUTO: 0 K/UL — SIGNIFICANT CHANGE UP (ref 0–0.2)
BASOPHILS NFR BLD AUTO: 0 % — SIGNIFICANT CHANGE UP (ref 0–2)
BILIRUB SERPL-MCNC: 0.6 MG/DL — SIGNIFICANT CHANGE UP (ref 0.2–1.2)
BUN SERPL-MCNC: 4 MG/DL — LOW (ref 7–23)
CALCIUM SERPL-MCNC: 7.9 MG/DL — LOW (ref 8.4–10.5)
CHLORIDE SERPL-SCNC: 103 MMOL/L — SIGNIFICANT CHANGE UP (ref 98–107)
CMV DNA CSF QL NAA+PROBE: SIGNIFICANT CHANGE UP
CMV DNA SPEC NAA+PROBE-LOG#: SIGNIFICANT CHANGE UP LOG10IU/ML
CO2 SERPL-SCNC: 23 MMOL/L — SIGNIFICANT CHANGE UP (ref 22–31)
CREAT SERPL-MCNC: 0.53 MG/DL — SIGNIFICANT CHANGE UP (ref 0.5–1.3)
EGFR: 108 ML/MIN/1.73M2 — SIGNIFICANT CHANGE UP
EOSINOPHIL # BLD AUTO: 0.35 K/UL — SIGNIFICANT CHANGE UP (ref 0–0.5)
EOSINOPHIL NFR BLD AUTO: 6 % — SIGNIFICANT CHANGE UP (ref 0–6)
GLUCOSE BLDC GLUCOMTR-MCNC: 118 MG/DL — HIGH (ref 70–99)
GLUCOSE BLDC GLUCOMTR-MCNC: 138 MG/DL — HIGH (ref 70–99)
GLUCOSE BLDC GLUCOMTR-MCNC: 219 MG/DL — HIGH (ref 70–99)
GLUCOSE BLDC GLUCOMTR-MCNC: 97 MG/DL — SIGNIFICANT CHANGE UP (ref 70–99)
GLUCOSE SERPL-MCNC: 136 MG/DL — HIGH (ref 70–99)
HCT VFR BLD CALC: 25.6 % — LOW (ref 34.5–45)
HGB BLD-MCNC: 8 G/DL — LOW (ref 11.5–15.5)
HYPOCHROMIA BLD QL: SIGNIFICANT CHANGE UP
IANC: 3.62 K/UL — SIGNIFICANT CHANGE UP (ref 1.8–7.4)
LYMPHOCYTES # BLD AUTO: 0.4 K/UL — LOW (ref 1–3.3)
LYMPHOCYTES # BLD AUTO: 6.9 % — LOW (ref 13–44)
MAGNESIUM SERPL-MCNC: 1.9 MG/DL — SIGNIFICANT CHANGE UP (ref 1.6–2.6)
MCHC RBC-ENTMCNC: 23.5 PG — LOW (ref 27–34)
MCHC RBC-ENTMCNC: 31.3 GM/DL — LOW (ref 32–36)
MCV RBC AUTO: 75.3 FL — LOW (ref 80–100)
METAMYELOCYTES # FLD: 0.9 % — SIGNIFICANT CHANGE UP (ref 0–1)
MONOCYTES # BLD AUTO: 0.84 K/UL — SIGNIFICANT CHANGE UP (ref 0–0.9)
MONOCYTES NFR BLD AUTO: 14.6 % — HIGH (ref 2–14)
MYELOCYTES NFR BLD: 0.9 % — HIGH (ref 0–0)
NEUTROPHILS # BLD AUTO: 4.03 K/UL — SIGNIFICANT CHANGE UP (ref 1.8–7.4)
NEUTROPHILS NFR BLD AUTO: 69.8 % — SIGNIFICANT CHANGE UP (ref 43–77)
PHOSPHATE SERPL-MCNC: 2.3 MG/DL — LOW (ref 2.5–4.5)
PLAT MORPH BLD: NORMAL — SIGNIFICANT CHANGE UP
PLATELET # BLD AUTO: 178 K/UL — SIGNIFICANT CHANGE UP (ref 150–400)
PLATELET COUNT - ESTIMATE: NORMAL — SIGNIFICANT CHANGE UP
POIKILOCYTOSIS BLD QL AUTO: SIGNIFICANT CHANGE UP
POLYCHROMASIA BLD QL SMEAR: SIGNIFICANT CHANGE UP
POTASSIUM SERPL-MCNC: 3.3 MMOL/L — LOW (ref 3.5–5.3)
POTASSIUM SERPL-SCNC: 3.3 MMOL/L — LOW (ref 3.5–5.3)
PROT SERPL-MCNC: 6.2 G/DL — SIGNIFICANT CHANGE UP (ref 6–8.3)
RBC # BLD: 3.4 M/UL — LOW (ref 3.8–5.2)
RBC # FLD: 17.4 % — HIGH (ref 10.3–14.5)
RBC BLD AUTO: ABNORMAL
SODIUM SERPL-SCNC: 134 MMOL/L — LOW (ref 135–145)
VARIANT LYMPHS # BLD: 0.9 % — SIGNIFICANT CHANGE UP (ref 0–6)
WBC # BLD: 5.78 K/UL — SIGNIFICANT CHANGE UP (ref 3.8–10.5)
WBC # FLD AUTO: 5.78 K/UL — SIGNIFICANT CHANGE UP (ref 3.8–10.5)

## 2022-10-26 PROCEDURE — 99232 SBSQ HOSP IP/OBS MODERATE 35: CPT | Mod: GC

## 2022-10-26 PROCEDURE — 99233 SBSQ HOSP IP/OBS HIGH 50: CPT

## 2022-10-26 PROCEDURE — 99255 IP/OBS CONSLTJ NEW/EST HI 80: CPT | Mod: GC

## 2022-10-26 RX ORDER — FLUCONAZOLE 150 MG/1
400 TABLET ORAL ONCE
Refills: 0 | Status: DISCONTINUED | OUTPATIENT
Start: 2022-10-26 | End: 2022-10-26

## 2022-10-26 RX ORDER — INFLUENZA VIRUS VACCINE 15; 15; 15; 15 UG/.5ML; UG/.5ML; UG/.5ML; UG/.5ML
0.5 SUSPENSION INTRAMUSCULAR ONCE
Refills: 0 | Status: DISCONTINUED | OUTPATIENT
Start: 2022-10-26 | End: 2022-11-01

## 2022-10-26 RX ORDER — POTASSIUM CHLORIDE 20 MEQ
10 PACKET (EA) ORAL
Refills: 0 | Status: COMPLETED | OUTPATIENT
Start: 2022-10-26 | End: 2022-10-26

## 2022-10-26 RX ORDER — PIPERACILLIN AND TAZOBACTAM 4; .5 G/20ML; G/20ML
3.38 INJECTION, POWDER, LYOPHILIZED, FOR SOLUTION INTRAVENOUS EVERY 8 HOURS
Refills: 0 | Status: DISCONTINUED | OUTPATIENT
Start: 2022-10-26 | End: 2022-10-31

## 2022-10-26 RX ORDER — FLUCONAZOLE 150 MG/1
200 TABLET ORAL EVERY 24 HOURS
Refills: 0 | Status: COMPLETED | OUTPATIENT
Start: 2022-10-27 | End: 2022-11-01

## 2022-10-26 RX ORDER — FLUCONAZOLE 150 MG/1
800 TABLET ORAL ONCE
Refills: 0 | Status: DISCONTINUED | OUTPATIENT
Start: 2022-10-26 | End: 2022-10-26

## 2022-10-26 RX ADMIN — Medication 1 GRAM(S): at 05:15

## 2022-10-26 RX ADMIN — Medication 5000 UNIT(S): at 12:01

## 2022-10-26 RX ADMIN — SODIUM CHLORIDE 100 MILLILITER(S): 9 INJECTION INTRAMUSCULAR; INTRAVENOUS; SUBCUTANEOUS at 11:58

## 2022-10-26 RX ADMIN — GABAPENTIN 300 MILLIGRAM(S): 400 CAPSULE ORAL at 17:19

## 2022-10-26 RX ADMIN — ATORVASTATIN CALCIUM 10 MILLIGRAM(S): 80 TABLET, FILM COATED ORAL at 22:41

## 2022-10-26 RX ADMIN — Medication 2: at 17:18

## 2022-10-26 RX ADMIN — Medication 100 MILLIGRAM(S): at 22:45

## 2022-10-26 RX ADMIN — Medication 250 MILLIGRAM(S): at 05:18

## 2022-10-26 RX ADMIN — ENOXAPARIN SODIUM 40 MILLIGRAM(S): 100 INJECTION SUBCUTANEOUS at 12:01

## 2022-10-26 RX ADMIN — ONDANSETRON 4 MILLIGRAM(S): 8 TABLET, FILM COATED ORAL at 05:15

## 2022-10-26 RX ADMIN — SENNA PLUS 2 TABLET(S): 8.6 TABLET ORAL at 22:40

## 2022-10-26 RX ADMIN — Medication 100 MILLIGRAM(S): at 12:12

## 2022-10-26 RX ADMIN — Medication 1 GRAM(S): at 17:19

## 2022-10-26 RX ADMIN — Medication 1 TABLET(S): at 12:00

## 2022-10-26 RX ADMIN — ONDANSETRON 4 MILLIGRAM(S): 8 TABLET, FILM COATED ORAL at 15:54

## 2022-10-26 RX ADMIN — Medication 100 MILLIEQUIVALENT(S): at 11:59

## 2022-10-26 RX ADMIN — Medication 100 MILLIEQUIVALENT(S): at 15:53

## 2022-10-26 RX ADMIN — PIPERACILLIN AND TAZOBACTAM 25 GRAM(S): 4; .5 INJECTION, POWDER, LYOPHILIZED, FOR SOLUTION INTRAVENOUS at 22:38

## 2022-10-26 RX ADMIN — GABAPENTIN 300 MILLIGRAM(S): 400 CAPSULE ORAL at 05:15

## 2022-10-26 RX ADMIN — POLYETHYLENE GLYCOL 3350 17 GRAM(S): 17 POWDER, FOR SOLUTION ORAL at 12:00

## 2022-10-26 RX ADMIN — MONTELUKAST 10 MILLIGRAM(S): 4 TABLET, CHEWABLE ORAL at 22:40

## 2022-10-26 RX ADMIN — ONDANSETRON 4 MILLIGRAM(S): 8 TABLET, FILM COATED ORAL at 22:41

## 2022-10-26 RX ADMIN — PANTOPRAZOLE SODIUM 40 MILLIGRAM(S): 20 TABLET, DELAYED RELEASE ORAL at 05:16

## 2022-10-26 RX ADMIN — Medication 100 MILLIEQUIVALENT(S): at 17:16

## 2022-10-26 NOTE — PROGRESS NOTE ADULT - PROBLEM SELECTOR PLAN 2
Odynophagia with dysphagia to solids and liquids 2/2 pain. 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 Tylenol does not help to relieve the pain. Morphine 4mg IV made her dizzy.  - C/w morphine 2mg IV q4 PRN   - S&S consult - not aspirating  - Soft diet for now in the setting of pain. Counseled family on low acidity foods for now.   - Viral work up   - GI consulted, recommend starting IV fluconazole for candida esophagitis. Will start today.   - ID consulted, f/u recommendations.

## 2022-10-26 NOTE — SWALLOW BEDSIDE ASSESSMENT ADULT - ADDITIONAL RECOMMENDATIONS
This department to follow up as schedule permits to assess for diet tolerance, possible diet advancement and/or need for objective assessment. Medical team further advised to reconsult if there is a change in medical status and/or observed change in patient's tolerance of recommended PO diet.

## 2022-10-26 NOTE — PROGRESS NOTE ADULT - ATTENDING COMMENTS
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 pHTN (dx on imaging 7/21/22), ventral hernia repair cb left inferior epigastric pseudoaneurysm, T2DM a1c 7.4%, HTN presenting with fever and chills found to have ESBL E coli UTI with GI consulted for odynophagia/dysphagia, Spoke with daughter at bedside who assisted with translation services    Left pulmonary radiation  Ongoing dysphagia, small white patch seen at the Left lateral aspect Of the tongue  Differential diagnosis includes infectious process such as candidiasis, less likely to be HSV/CMV esophagitis, rule out radiation, rule out erosive esophagitis secondary to GERD    Recommendation  1.  PPI twice a day  2.  Soft foods as tolerated  3.  Given febrile o/n, with new imaging concerning for superimposed infection with lung mass s/p radiation, would avoid anesthesia and EGD until this improves, in the meanwhile, start empiric treatment for Candidiasis of esophagus, IV fluconazole 200mg IV Qdaily, MUST HOLD ATORVASTATIN.

## 2022-10-26 NOTE — PATIENT PROFILE ADULT - FUNCTIONAL ASSESSMENT - DAILY ACTIVITY 5.
Addended by: CHRIS MANDUJANO on: 8/10/2020 09:01 AM     Modules accepted: Orders     4 = No assist / stand by assistance

## 2022-10-26 NOTE — DIETITIAN INITIAL EVALUATION ADULT - PERTINENT LABORATORY DATA
10-26    134<L>  |  103  |  4<L>  ----------------------------<  136<H>  3.3<L>   |  23  |  0.53    Ca    7.9<L>      26 Oct 2022 05:48  Phos  2.3     10-26  Mg     1.90     10-26    TPro  6.2  /  Alb  2.5<L>  /  TBili  0.6  /  DBili  x   /  AST  32  /  ALT  17  /  AlkPhos  136<H>  10-26  POCT Blood Glucose.: 97 mg/dL (10-26-22 @ 21:20)  A1C with Estimated Average Glucose Result: 7.4 % (10-22-22 @ 13:40)  A1C with Estimated Average Glucose Result: 5.7 % (07-20-22 @ 06:20)  A1C with Estimated Average Glucose Result: 7.0 % (01-07-22 @ 15:46)

## 2022-10-26 NOTE — CONSULT NOTE ADULT - SUBJECTIVE AND OBJECTIVE BOX
Patient is a 57y old  Female who presents with a chief complaint of fevers and chills (26 Oct 2022 13:40)    HPI:  Ms. Lagunas is a 58 y/o F with pmhx of metastatic ampullary carcinoma s/p Whipple in 2019, Type 2 DM, HTN who presents for 4 days of abdominal pain with associated fevers and chills. Daughter at bedside provide translation as patient is Maltese speaking. Patient states that sxs started last week after her chemo and radiation sessions. She endorsed upper quadrant abdominal pain that is worsened with eating foods both solids and liquids. She endorses burning sensation in the middle of her chest after eating. Denies any nausea, vomiting, hematemesis, hematochezia diarrhea . Last BM was 2 days ago. Daughter that patient has lost 2 lbs in last week, usually around 103-105 lbs. Patient also feeling more fatigued than baseline and with minimal appetite. Patient was prescribed sucralfate mixed with lidocaine and oxycodone 5mg but it only decreased pain minimally. Alos endorsing pain with swallowing, feels like a stuck feeling in her chest, " like a chest tightness"  Over the last 24 hours daughter states that Tmax at home was 105.1 orally. She called her provider at [facility] and was told to come to the ED.     Patient admitted back in July for YUNI opacity and received bronchoscopy. Results were negative for mets to lung but were positive fro mets to lymph nodes with pancreatobiliary primary. She currently received chemotherapy biweekly, last session on Oct 11. Currently on Gemzar and Abraxane. Patient has recently completed 15 back to back sessions of radiation, last session was 10/14.    In ED, Tmax 100.1. CT A/P showed possible gastritis and possible skipped colitis. Received 1 dose of vanc and cefepime. Admitted to medicine for further management.  (22 Oct 2022 08:48)       REVIEW OF SYSTEMS  [  ] ROS unobtainable because:    [  ] All other systems negative except as noted below    Constitutional:  [ ] fever [ ] chills  [ ] weight loss  [ ]night sweat  [ ]poor appetite/PO intake [ ]fatigue   Skin:  [ ] rash [ ] phlebitis	  Eyes: [ ] icterus [ ] pain  [ ] discharge	  ENMT: [ ] sore throat  [ ] thrush [ ] ulcers [ ] exudates [ ]anosmia  Respiratory: [ ] dyspnea [ ] hemoptysis [ ] cough [ ] sputum	  Cardiovascular:  [ ] chest pain [ ] palpitations [ ] edema	  Gastrointestinal:  [ ] nausea [ ] vomiting [ ] diarrhea [ ] constipation [ ] pain	  Genitourinary:  [ ] dysuria [ ] frequency [ ] hematuria [ ] discharge [ ] flank pain  [ ] incontinence  Musculoskeletal:  [ ] myalgias [ ] arthralgias [ ] arthritis  [ ] back pain  Neurological:  [ ] headache [ ] weakness [ ] seizures  [ ] confusion/altered mental status    prior hospital charts reviewed [V]  primary team notes reviewed [V]  other consultant notes reviewed [V]    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 3.375 every 8 hours  vancomycin  IVPB 750 every 12 hours    ANTIMICROBIALS (past 90 days):  MEDICATIONS  (STANDING):  cefepime   IVPB   100 mL/Hr IV Intermittent (10-22-22 @ 03:21)    cefepime   IVPB   100 mL/Hr IV Intermittent (10-23-22 @ 05:59)   100 mL/Hr IV Intermittent (10-22-22 @ 21:46)   100 mL/Hr IV Intermittent (10-22-22 @ 14:59)    piperacillin/tazobactam IVPB.   200 mL/Hr IV Intermittent (10-23-22 @ 09:39)    piperacillin/tazobactam IVPB.-   25 mL/Hr IV Intermittent (10-23-22 @ 12:35)    piperacillin/tazobactam IVPB..   25 mL/Hr IV Intermittent (10-25-22 @ 13:53)   25 mL/Hr IV Intermittent (10-25-22 @ 05:36)   25 mL/Hr IV Intermittent (10-24-22 @ 21:35)   25 mL/Hr IV Intermittent (10-24-22 @ 13:54)   25 mL/Hr IV Intermittent (10-24-22 @ 05:38)   25 mL/Hr IV Intermittent (10-23-22 @ 23:24)    vancomycin  IVPB   250 mL/Hr IV Intermittent (10-26-22 @ 05:18)   250 mL/Hr IV Intermittent (10-25-22 @ 18:38)    vancomycin  IVPB.   250 mL/Hr IV Intermittent (10-22-22 @ 04:04)    OTHER MEDS:   MEDICATIONS  (STANDING):  acetaminophen     Tablet .. 650 every 6 hours PRN  atorvastatin 10 at bedtime  benzonatate 100 three times a day PRN  bisacodyl 5 daily PRN  dextrose 50% Injectable 25 once  dextrose 50% Injectable 12.5 once  dextrose 50% Injectable 25 once  dextrose Oral Gel 15 once  enoxaparin Injectable 40 every 24 hours  gabapentin 300 two times a day  glucagon  Injectable 1 once  guaiFENesin Oral Liquid (Sugar-Free) 100 every 6 hours PRN  influenza   Vaccine 0.5 once  insulin lispro (ADMELOG) corrective regimen sliding scale  three times a day before meals  insulin lispro (ADMELOG) corrective regimen sliding scale  at bedtime  montelukast 10 at bedtime  morphine  - Injectable 2 every 4 hours PRN  morphine  - Injectable 4 every 4 hours PRN  ondansetron   Disintegrating Tablet 4 every 8 hours  pantoprazole   Suspension 40 before breakfast  polyethylene glycol 3350 17 daily  senna 2 at bedtime  sucralfate 1 two times a day    VITALS:  Vital Signs Last 24 Hrs  T(F): 98.6 (10-26-22 @ 12:49), Max: 102.8 (10-25-22 @ 15:24)    Vital Signs Last 24 Hrs  HR: 95 (10-26-22 @ 12:49) (70 - 95)  BP: 114/65 (10-26-22 @ 12:49) (101/60 - 118/65)  RR: 18 (10-26-22 @ 12:49)  SpO2: 96% (10-26-22 @ 12:49) (96% - 99%)  Wt(kg): --    EXAM:  Physical Exam:  Constitutional:  well preserved, comfortable  Head/Eyes: no icterus, PERRL, EOMI  ENT:  supple; no thrush  LUNGS:  CTA  CVS:  normal S1, S2, no murmur  Abd:  soft, non-tender; non-distended  Ext:  no edema  Vascular:  IV site no erythema tenderness or discharge  MSK:  joints without swelling  Neuro: AAO X 3, non- focal    Labs:                        8.0    5.78  )-----------( 178      ( 26 Oct 2022 05:48 )             25.6     10-26    134<L>  |  103  |  4<L>  ----------------------------<  136<H>  3.3<L>   |  23  |  0.53    Ca    7.9<L>      26 Oct 2022 05:48  Phos  2.3     10-26  Mg     1.90     10-26    TPro  6.2  /  Alb  2.5<L>  /  TBili  0.6  /  DBili  x   /  AST  32  /  ALT  17  /  AlkPhos  136<H>  10-26    WBC Trend:  WBC Count: 5.78 (10-26-22 @ 05:48)  WBC Count: 4.87 (10-25-22 @ 07:20)  WBC Count: 4.24 (10-23-22 @ 07:20)  WBC Count: 3.09 (10-22-22 @ 01:30)    Auto Neutrophil #: 4.03 K/uL (10-26-22 @ 05:48)  Auto Neutrophil #: 2.22 K/uL (10-23-22 @ 07:20)  Auto Neutrophil #: 1.91 K/uL (10-22-22 @ 01:30)  Band Neutrophils %: 4.4 % (10-22-22 @ 01:30)  Auto Neutrophil #: 1.52 K/uL (10-11-22 @ 15:06)    Creatine Trend:  Creatinine, Serum: 0.53 (10-26)  Creatinine, Serum: 0.56 (10-25)  Creatinine, Serum: 0.54 (10-23)  Creatinine, Serum: 0.64 (10-22)    Liver Biochemical Testing Trend:  Alanine Aminotransferase (ALT/SGPT): 17 (10-26)  Alanine Aminotransferase (ALT/SGPT): 24 (10-23)  Alanine Aminotransferase (ALT/SGPT): 20 (10-22)  Alanine Aminotransferase (ALT/SGPT): 25 (10-11)  Alanine Aminotransferase (ALT/SGPT): 31 (09-13)  Aspartate Aminotransferase (AST/SGOT): 32 (10-26-22 @ 05:48)  Aspartate Aminotransferase (AST/SGOT): 34 (10-23-22 @ 07:20)  Aspartate Aminotransferase (AST/SGOT): 28 (10-22-22 @ 01:55)  Aspartate Aminotransferase (AST/SGOT): 35 (10-11-22 @ 16:03)  Aspartate Aminotransferase (AST/SGOT): 46 (09-13-22 @ 15:43)  Bilirubin Total, Serum: 0.6 (10-26)  Bilirubin Total, Serum: 0.9 (10-23)  Bilirubin Total, Serum: 0.9 (10-22)  Bilirubin Total, Serum: 0.6 (10-11)  Bilirubin Total, Serum: 0.5 (09-13)    Trend LDH    Auto Eosinophil %: 6.0 % (10-26-22 @ 05:48)    MICROBIOLOGY:    MRSA PCR Result.: NotDetec (07-20-22 @ 09:12)    Culture - Blood (collected 22 Oct 2022 13:49)  Source: .Blood Blood-Peripheral  Preliminary Report:    No growth to date.    Culture - Blood (collected 22 Oct 2022 13:40)  Source: .Blood Blood-Peripheral  Preliminary Report:    No growth to date.    Culture - Urine (collected 22 Oct 2022 02:28)  Source: Clean Catch Clean Catch (Midstream)  Final Report:    >100,000 CFU/ml Escherichia coli ESBL  Organism: Escherichia coli ESBL  Organism: Escherichia coli ESBL    Sensitivities:      -  Amikacin: S <=16      -  Amoxicillin/Clavulanic Acid: S <=8/4      -  Ampicillin: R >16 These ampicillin results predict results for amoxicillin      -  Ampicillin/Sulbactam: S 8/4 Enterobacter, Klebsiella aerogenes, Citrobacter, and Serratia may develop resistance during prolonged therapy (3-4 days)      -  Aztreonam: R >16      -  Cefazolin: R >16 For uncomplicated UTI with K. pneumoniae, E. coli, or P. mirablis: TALHA <=16 is sensitive and TALHA >=32 is resistant. This also predicts results for oral agents cefaclor, cefdinir, cefpodoxime, cefprozil, cefuroxime axetil, cephalexin and locarbef for uncomplicated UTI. Note that some isolates may be susceptible to these agents while testing resistant to cefazolin.      -  Cefepime: R >16      -  Ceftriaxone: R >32 Enterobacter, Klebsiella aerogenes, Citrobacter, and Serratia may develop resistance during prolonged therapy      -  Ciprofloxacin: S <=0.25      -  Ertapenem: S <=0.5      -  Gentamicin: S <=2      -  Imipenem: S <=1      -  Levofloxacin: S <=0.5      -  Meropenem: S <=1      -  Nitrofurantoin: S <=32 Should not be used to treat pyelonephritis      -  Piperacillin/Tazobactam: S <=8      -  Tigecycline: S <=2      -  Tobramycin: S <=2      -  Trimethoprim/Sulfamethoxazole: S <=0.5/9.5      Method Type: TALHA    Culture - Acid Fast - Bronchial w/Smear (collected 20 Jul 2022 16:20)  Source: .Bronchial LEFT UPPER LOBE  Final Report:    No acid fast bacilli isolated after 6 weeks.    Culture - Fungal, Bronchial (collected 20 Jul 2022 16:20)  Source: .Bronchial LEFT UPPER LOBE  Final Report:    No fungus isolated after 4 weeks.    Culture - Bronchial (collected 20 Jul 2022 16:20)  Source: .Bronchial LEFT UPPER LOBE  Final Report:    Normal Respiratory Danielle present    Culture - Blood (collected 06 Jul 2022 02:08)  Source: .Blood Blood-Peripheral  Final Report:    No Growth Final    Culture - Blood (collected 06 Jul 2022 02:08)  Source: .Blood Blood-Peripheral  Final Report:    No Growth Final    Cytomegalovirus By PCR:   NotDetec (10-25-22 @ 07:20)    Rapid RVP Result: NotDetec (10-25 @ 17:38)  Rapid RVP Result: NotDetec (10-22 @ 02:19)    Procalcitonin, Serum: 9.19 (10-25)    Lactate, Blood: 1.9 (10-24 @ 07:32)    A1C with Estimated Average Glucose Result: 7.4 % (10-22-22 @ 13:40)  A1C with Estimated Average Glucose Result: 5.7 % (07-20-22 @ 06:20)    RADIOLOGY:  imaging below personally reviewed  < from: CT Chest w/ IV Cont (10.25.22 @ 17:48) >  FINDINGS:    MEDIASTINUM / LYMPH NODES: A few small mediastinal lymph nodes are noted,   unchanged.    HEART/VASCULATURE: The heart is normal in size. There is no pericardial   effusion. Right chest port with tip in the right atrium.    AIRWAYS/LUNGS/PLEURA: Redemonstrated left upper lobe/paramediastinal mass   now measuring 4.1 x 1.6 cm, previously 5.1 x 2.2 cm. The mass continues   to encase and narrow the left upper lobar branch of the pulmonary artery.   Underlying interlobular septal thickening is again visualized. There is   interval development of new groundglass opacities throughout the left   upper lobe, suggestive of infection. Bibasilar atelectasis. No pleural   effusion or pneumothorax.    UPPER ABDOMEN: Status post Whipple with pneumobilia. Small volume ascites   in the gastrohepatic space.    BONES/SOFT TISSUES: Degenerative changes in the spine. Unchanged small   sclerotic focus in the T6 vertebral body.    IMPRESSION:    Interval decrease in size of left upper lobe/paramediastinal mass and   adjacent metastatic disease.    Interval development of new ground glass opacities in the left upper   lobe, suggestive of infection in the setting of recent chemotherapy.      < end of copied text >  < from: Xray Chest 1 View- PORTABLE-Urgent (Xray Chest 1 View- PORTABLE-Urgent .) (10.25.22 @ 12:25) >  NTERPRETATION:  EXAMINATION: XR CHEST URGENT    CLINICAL INDICATION: Coughing    TECHNIQUE: Single frontal, portable view of the chest was obtained.    COMPARISON: Chest x-ray 10/22/2022.    FINDINGS:  Right chest wall medication port with tip in the SVC.  The heart is not accurately assessed in this AP projection.  Hazy opacification in the left upper to mid lung consistent with left  upper lobe pneumonia.  There is no pneumothorax or right pleural effusion. The left phrenic   angle was not visualized.    IMPRESSION: Left upper lobe pneumonia.    < end of copied text >   Patient is a 57y old  Female who presents with a chief complaint of fevers and chills (26 Oct 2022 13:40)    HPI:  Ms. Lagunas is a 58 y/o F with pmhx of metastatic ampullary carcinoma s/p Whipple in 2019, Type 2 DM, HTN who presents for 4 days of abdominal pain with associated fevers and chills. Daughter at bedside provide translation as patient is Greek speaking. Patient states that sxs started last week after her chemo and radiation sessions. She endorsed upper quadrant abdominal pain that is worsened with eating foods both solids and liquids. She endorses burning sensation in the middle of her chest after eating. Denies any nausea, vomiting, hematemesis, hematochezia diarrhea . Last BM was 2 days ago. Daughter that patient has lost 2 lbs in last week, usually around 103-105 lbs. Patient also feeling more fatigued than baseline and with minimal appetite. Patient was prescribed sucralfate mixed with lidocaine and oxycodone 5mg but it only decreased pain minimally. Alos endorsing pain with swallowing, feels like a stuck feeling in her chest, " like a chest tightness"  Over the last 24 hours daughter states that Tmax at home was 105.1 orally. She called [facility] provider at [facility] and was told to come to the ED.     Patient admitted back in July for YUNI opacity and received bronchoscopy. Results were negative for mets to lung but were positive fro mets to lymph nodes with pancreatobiliary primary. She currently received chemotherapy biweekly, last session on Oct 11. Currently on Gemzar and Abraxane. Patient has recently completed 15 back to back sessions of radiation, last session was 10/14.    In ED, Tmax 100.1. CT A/P showed possible gastritis and possible skipped colitis. Received 1 dose of vanc and cefepime. Admitted to medicine for further management.  (22 Oct 2022 08:48)       REVIEW OF SYSTEMS  [  ] ROS unobtainable because:    [X] All other systems negative except as noted below    Constitutional:  [ ] fever [ ] chills  [ ] weight loss  [ ]night sweat  [ ]poor appetite/PO intake [ ]fatigue [x]odynophagia  Skin:  [ ] rash [ ] phlebitis	  Eyes: [ ] icterus [ ] pain  [ ] discharge	  ENMT: [ ] sore throat  [ ] thrush [ ] ulcers [ ] exudates [ ]anosmia  Respiratory: [ ] dyspnea [ ] hemoptysis [ ] cough [ ] sputum	  Cardiovascular:  [ ] chest pain [ ] palpitations [ ] edema	  Gastrointestinal:  [ ] nausea [ ] vomiting [ ] diarrhea [ ] constipation [X] pain	  Genitourinary:  [ ] dysuria [ ] frequency [ ] hematuria [ ] discharge [ ] flank pain  [ ] incontinence  Musculoskeletal:  [ ] myalgias [ ] arthralgias [ ] arthritis  [ ] back pain  Neurological:  [ ] headache [ ] weakness [ ] seizures  [ ] confusion/altered mental status    prior hospital charts reviewed [V]  primary team notes reviewed [V]  other consultant notes reviewed [V]    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 3.375 every 8 hours  vancomycin  IVPB 750 every 12 hours    ANTIMICROBIALS (past 90 days):  MEDICATIONS  (STANDING):  cefepime   IVPB   100 mL/Hr IV Intermittent (10-22-22 @ 03:21)    cefepime   IVPB   100 mL/Hr IV Intermittent (10-23-22 @ 05:59)   100 mL/Hr IV Intermittent (10-22-22 @ 21:46)   100 mL/Hr IV Intermittent (10-22-22 @ 14:59)    piperacillin/tazobactam IVPB.   200 mL/Hr IV Intermittent (10-23-22 @ 09:39)    piperacillin/tazobactam IVPB.-   25 mL/Hr IV Intermittent (10-23-22 @ 12:35)    piperacillin/tazobactam IVPB..   25 mL/Hr IV Intermittent (10-25-22 @ 13:53)   25 mL/Hr IV Intermittent (10-25-22 @ 05:36)   25 mL/Hr IV Intermittent (10-24-22 @ 21:35)   25 mL/Hr IV Intermittent (10-24-22 @ 13:54)   25 mL/Hr IV Intermittent (10-24-22 @ 05:38)   25 mL/Hr IV Intermittent (10-23-22 @ 23:24)    vancomycin  IVPB   250 mL/Hr IV Intermittent (10-26-22 @ 05:18)   250 mL/Hr IV Intermittent (10-25-22 @ 18:38)    vancomycin  IVPB.   250 mL/Hr IV Intermittent (10-22-22 @ 04:04)    OTHER MEDS:   MEDICATIONS  (STANDING):  acetaminophen     Tablet .. 650 every 6 hours PRN  atorvastatin 10 at bedtime  benzonatate 100 three times a day PRN  bisacodyl 5 daily PRN  dextrose 50% Injectable 25 once  dextrose 50% Injectable 12.5 once  dextrose 50% Injectable 25 once  dextrose Oral Gel 15 once  enoxaparin Injectable 40 every 24 hours  gabapentin 300 two times a day  glucagon  Injectable 1 once  guaiFENesin Oral Liquid (Sugar-Free) 100 every 6 hours PRN  influenza   Vaccine 0.5 once  insulin lispro (ADMELOG) corrective regimen sliding scale  three times a day before meals  insulin lispro (ADMELOG) corrective regimen sliding scale  at bedtime  montelukast 10 at bedtime  morphine  - Injectable 2 every 4 hours PRN  morphine  - Injectable 4 every 4 hours PRN  ondansetron   Disintegrating Tablet 4 every 8 hours  pantoprazole   Suspension 40 before breakfast  polyethylene glycol 3350 17 daily  senna 2 at bedtime  sucralfate 1 two times a day    VITALS:  Vital Signs Last 24 Hrs  T(F): 98.6 (10-26-22 @ 12:49), Max: 102.8 (10-25-22 @ 15:24)    Vital Signs Last 24 Hrs  HR: 95 (10-26-22 @ 12:49) (70 - 95)  BP: 114/65 (10-26-22 @ 12:49) (101/60 - 118/65)  RR: 18 (10-26-22 @ 12:49)  SpO2: 96% (10-26-22 @ 12:49) (96% - 99%)  Wt(kg): --    EXAM:  Physical Exam:  Constitutional:  well preserved, comfortable  Head/Eyes: no icterus, PERRL, EOMI  ENT:  supple; no thrush  LUNGS:  CTA  CVS:  normal S1, S2, no murmur  Abd:  soft, non-tender; non-distended  Ext:  no edema  Vascular:  IV site no erythema tenderness or discharge  MSK:  joints without swelling  Neuro: AAO X 3, non- focal    Labs:                        8.0    5.78  )-----------( 178      ( 26 Oct 2022 05:48 )             25.6     10-26    134<L>  |  103  |  4<L>  ----------------------------<  136<H>  3.3<L>   |  23  |  0.53    Ca    7.9<L>      26 Oct 2022 05:48  Phos  2.3     10-26  Mg     1.90     10-26    TPro  6.2  /  Alb  2.5<L>  /  TBili  0.6  /  DBili  x   /  AST  32  /  ALT  17  /  AlkPhos  136<H>  10-26    WBC Trend:  WBC Count: 5.78 (10-26-22 @ 05:48)  WBC Count: 4.87 (10-25-22 @ 07:20)  WBC Count: 4.24 (10-23-22 @ 07:20)  WBC Count: 3.09 (10-22-22 @ 01:30)    Auto Neutrophil #: 4.03 K/uL (10-26-22 @ 05:48)  Auto Neutrophil #: 2.22 K/uL (10-23-22 @ 07:20)  Auto Neutrophil #: 1.91 K/uL (10-22-22 @ 01:30)  Band Neutrophils %: 4.4 % (10-22-22 @ 01:30)  Auto Neutrophil #: 1.52 K/uL (10-11-22 @ 15:06)    Creatine Trend:  Creatinine, Serum: 0.53 (10-26)  Creatinine, Serum: 0.56 (10-25)  Creatinine, Serum: 0.54 (10-23)  Creatinine, Serum: 0.64 (10-22)    Liver Biochemical Testing Trend:  Alanine Aminotransferase (ALT/SGPT): 17 (10-26)  Alanine Aminotransferase (ALT/SGPT): 24 (10-23)  Alanine Aminotransferase (ALT/SGPT): 20 (10-22)  Alanine Aminotransferase (ALT/SGPT): 25 (10-11)  Alanine Aminotransferase (ALT/SGPT): 31 (09-13)  Aspartate Aminotransferase (AST/SGOT): 32 (10-26-22 @ 05:48)  Aspartate Aminotransferase (AST/SGOT): 34 (10-23-22 @ 07:20)  Aspartate Aminotransferase (AST/SGOT): 28 (10-22-22 @ 01:55)  Aspartate Aminotransferase (AST/SGOT): 35 (10-11-22 @ 16:03)  Aspartate Aminotransferase (AST/SGOT): 46 (09-13-22 @ 15:43)  Bilirubin Total, Serum: 0.6 (10-26)  Bilirubin Total, Serum: 0.9 (10-23)  Bilirubin Total, Serum: 0.9 (10-22)  Bilirubin Total, Serum: 0.6 (10-11)  Bilirubin Total, Serum: 0.5 (09-13)    Trend LDH    Auto Eosinophil %: 6.0 % (10-26-22 @ 05:48)    MICROBIOLOGY:    MRSA PCR Result.: NotDetec (07-20-22 @ 09:12)    Culture - Blood (collected 22 Oct 2022 13:49)  Source: .Blood Blood-Peripheral  Preliminary Report:    No growth to date.    Culture - Blood (collected 22 Oct 2022 13:40)  Source: .Blood Blood-Peripheral  Preliminary Report:    No growth to date.    Culture - Urine (collected 22 Oct 2022 02:28)  Source: Clean Catch Clean Catch (Midstream)  Final Report:    >100,000 CFU/ml Escherichia coli ESBL  Organism: Escherichia coli ESBL  Organism: Escherichia coli ESBL    Sensitivities:      -  Amikacin: S <=16      -  Amoxicillin/Clavulanic Acid: S <=8/4      -  Ampicillin: R >16 These ampicillin results predict results for amoxicillin      -  Ampicillin/Sulbactam: S 8/4 Enterobacter, Klebsiella aerogenes, Citrobacter, and Serratia may develop resistance during prolonged therapy (3-4 days)      -  Aztreonam: R >16      -  Cefazolin: R >16 For uncomplicated UTI with K. pneumoniae, E. coli, or P. mirablis: TALHA <=16 is sensitive and TALHA >=32 is resistant. This also predicts results for oral agents cefaclor, cefdinir, cefpodoxime, cefprozil, cefuroxime axetil, cephalexin and locarbef for uncomplicated UTI. Note that some isolates may be susceptible to these agents while testing resistant to cefazolin.      -  Cefepime: R >16      -  Ceftriaxone: R >32 Enterobacter, Klebsiella aerogenes, Citrobacter, and Serratia may develop resistance during prolonged therapy      -  Ciprofloxacin: S <=0.25      -  Ertapenem: S <=0.5      -  Gentamicin: S <=2      -  Imipenem: S <=1      -  Levofloxacin: S <=0.5      -  Meropenem: S <=1      -  Nitrofurantoin: S <=32 Should not be used to treat pyelonephritis      -  Piperacillin/Tazobactam: S <=8      -  Tigecycline: S <=2      -  Tobramycin: S <=2      -  Trimethoprim/Sulfamethoxazole: S <=0.5/9.5      Method Type: TALHA    Culture - Acid Fast - Bronchial w/Smear (collected 20 Jul 2022 16:20)  Source: .Bronchial LEFT UPPER LOBE  Final Report:    No acid fast bacilli isolated after 6 weeks.    Culture - Fungal, Bronchial (collected 20 Jul 2022 16:20)  Source: .Bronchial LEFT UPPER LOBE  Final Report:    No fungus isolated after 4 weeks.    Culture - Bronchial (collected 20 Jul 2022 16:20)  Source: .Bronchial LEFT UPPER LOBE  Final Report:    Normal Respiratory Danielle present    Culture - Blood (collected 06 Jul 2022 02:08)  Source: .Blood Blood-Peripheral  Final Report:    No Growth Final    Culture - Blood (collected 06 Jul 2022 02:08)  Source: .Blood Blood-Peripheral  Final Report:    No Growth Final    Cytomegalovirus By PCR:   NotDetec (10-25-22 @ 07:20)    Rapid RVP Result: NotDetec (10-25 @ 17:38)  Rapid RVP Result: NotDetec (10-22 @ 02:19)    Procalcitonin, Serum: 9.19 (10-25)    Lactate, Blood: 1.9 (10-24 @ 07:32)    A1C with Estimated Average Glucose Result: 7.4 % (10-22-22 @ 13:40)  A1C with Estimated Average Glucose Result: 5.7 % (07-20-22 @ 06:20)    RADIOLOGY:  imaging below personally reviewed  < from: CT Chest w/ IV Cont (10.25.22 @ 17:48) >  FINDINGS:    MEDIASTINUM / LYMPH NODES: A few small mediastinal lymph nodes are noted,   unchanged.    HEART/VASCULATURE: The heart is normal in size. There is no pericardial   effusion. Right chest port with tip in the right atrium.    AIRWAYS/LUNGS/PLEURA: Redemonstrated left upper lobe/paramediastinal mass   now measuring 4.1 x 1.6 cm, previously 5.1 x 2.2 cm. The mass continues   to encase and narrow the left upper lobar branch of the pulmonary artery.   Underlying interlobular septal thickening is again visualized. There is   interval development of new groundglass opacities throughout the left   upper lobe, suggestive of infection. Bibasilar atelectasis. No pleural   effusion or pneumothorax.    UPPER ABDOMEN: Status post Whipple with pneumobilia. Small volume ascites   in the gastrohepatic space.    BONES/SOFT TISSUES: Degenerative changes in the spine. Unchanged small   sclerotic focus in the T6 vertebral body.    IMPRESSION:    Interval decrease in size of left upper lobe/paramediastinal mass and   adjacent metastatic disease.    Interval development of new ground glass opacities in the left upper   lobe, suggestive of infection in the setting of recent chemotherapy.      < end of copied text >  < from: Xray Chest 1 View- PORTABLE-Urgent (Xray Chest 1 View- PORTABLE-Urgent .) (10.25.22 @ 12:25) >  NTERPRETATION:  EXAMINATION: XR CHEST URGENT    CLINICAL INDICATION: Coughing    TECHNIQUE: Single frontal, portable view of the chest was obtained.    COMPARISON: Chest x-ray 10/22/2022.    FINDINGS:  Right chest wall medication port with tip in the SVC.  The heart is not accurately assessed in this AP projection.  Hazy opacification in the left upper to mid lung consistent with left  upper lobe pneumonia.  There is no pneumothorax or right pleural effusion. The left phrenic   angle was not visualized.    IMPRESSION: Left upper lobe pneumonia.    < end of copied text >

## 2022-10-26 NOTE — PROGRESS NOTE ADULT - PROBLEM SELECTOR PLAN 1
Sepsis 2/2 UTI. UA positive. Febrile to 102.8 yesterday. No leukocytosis. Pt. is immunocompromised in the setting of malignancy, chemo and radiation. CXR showing a YUNI consolidation. CT chest performed showing a post-obstructive PNA. Broadened antibiotics. No fever since last night.   - UCx with E.coli resistant to cephalosporins. Given, ongoing fever will continue to treat with broad spectrum antibiotics (zosyn).   - F/u BCx from 10/15 - NGTD   - C/w vancomycin and zosyn   - Sputum cx ordered   - ID consulted Sepsis 2/2 UTI. UA positive. Febrile to 102.8 yesterday. No leukocytosis. Pt. is immunocompromised in the setting of malignancy, chemo and radiation. CXR showing a YUNI consolidation. CT chest performed showing a post-obstructive PNA. Broadened antibiotics. No fever since last night.   - UCx with E.coli resistant to cephalosporins. Given, ongoing fever will continue to treat with broad spectrum antibiotics (zosyn).   - F/u BCx from 10/25 - NGTD   - C/w vancomycin and zosyn   - Sputum cx ordered   - ID consulted

## 2022-10-26 NOTE — SWALLOW BEDSIDE ASSESSMENT ADULT - COMMENTS
Hospitalist 10/25 "58 y/o F with pmhx of metastatic ampullary carcinoma s/p Whipple in 2019, Type 2 DM, HTN who presents for 14 days of abdominal pain with associated fevers and chills. CT imaging consistent with gastritis and colitis likely 2/2 to radiation treatment. Currently being treated empirically".     WBC is WFL. CXR 10/25 "Left upper lobe pneumonia."    Patient seen at bedside, awake/alert and oriented, during clinical swallow evaluation this AM. SLP spoke with patient in Hebrew language, as this SLP is fluent in Hebrew. Patient able to follow directions, answer questions, and make wants/needs known. Patient reported odynophagia and globus sensation at the throat and chest during PO intake. Patient reported pain at the chest when eating "as the food goes down".

## 2022-10-26 NOTE — DIETITIAN INITIAL EVALUATION ADULT - OTHER INFO
Pt 56 yo female with PMHx of DM, metastatic ampullary carcinoma (s/p Whipple in 2019), HLD, lung mass (on chemo, s/p radiation), presented with fever, fatigue, worsening abdominal pain, cough - per chart review.     At time of visit, Pt awake, alert. Pt speaks Estonian; this RDN speaks Estonian as well. Of note, Pt NPO at time of visit. Per Pt, her appetite has been poor for a while. Pt C/O swallowing difficulty "some times". Pt coughs with liquids "at times" reported. No report of chewing difficulty; no report of nausea, vomiting or diarrhea @ this time. +BM (10/24) per flow sheet.     Pt not sure about her height; Pt's weight: 103#; Pt with unintended weight loss, but unable to quantify. Of note, Pt's weights: 47 kg (10/21), 49 kg (HIE - 5/16/22). Nutrition focused physical exam conducted, Pt found with signs of subcutaneous fat loss: [moderate] Buccal fat region & muscle wasting: [moderate] Temples region, [moderate] Clavicle region, [severe] Calf region. Rec to add PO supplement: Glucerna shake - 2x daily to diet rx, once PO diet initiates. RDN answered concerns related to diet. RDN remains available, Pt made aware.    Pt 58 yo female with PMHx of DM, metastatic ampullary carcinoma (s/p Whipple in 2019), HLD, lung mass (on chemo, s/p radiation), presented with fever, fatigue, worsening abdominal pain, cough - per chart review.     At time of visit, Pt awake, alert. Pt speaks Slovenian; this RDN speaks Slovenian as well. Pt NPO at time of visit. Per Pt, her appetite not well for a while. Pt C/O swallowing difficulty "some times". Pt coughs with liquids "at times" reported. No report of chewing difficulty; no report of nausea, vomiting or diarrhea @ this time. +BM (10/24) per flow sheet. Of note, Pt passed Swallow Bedside Assessment Adult, SLP rec: Soft and Bite Sized Solids and Thin Liquids (10/26).    Pt not sure about her height; Pt's weight: ~103#; Pt with unintended weight loss, but unable to quantify. Of note, Pt's weights: 47 kg (10/21), 49 kg (HIE - 5/16/22). Nutrition focused physical exam conducted, Pt found with signs of subcutaneous fat loss: [moderate] Buccal fat region & muscle wasting: [moderate] Temples region, [moderate] Clavicle region, [severe] Calf region. Rec to add PO supplement: Glucerna shake - 2x daily to diet rx, once PO diet initiates.     Of note, Pt's HbA1c level 7.4% (10/22). Better food options briefly discussed as Pt C/O weakness & fatigue at time of visit. Will reattempt to visit Pt for therapeutic diet education at a later time, as able. RDN answered concerns related to diet. RDN remains available, Pt made aware.

## 2022-10-26 NOTE — PATIENT PROFILE ADULT - FOOD INSECURITY
----- Message from Quin Cuevas sent at 2/23/2018 10:01 AM CST -----  Regarding: med decrease/Fermin  Contact: [phone]  Pt is the caller. She thought about decreasing Lorazepam and she does want to proceed with the med decrease, but wanted to clarify this with Darlin. Please follow up with pt as she is refilling the script today. Ok to leave detailed message.  
no

## 2022-10-26 NOTE — PATIENT PROFILE ADULT - FALL HARM RISK - HARM RISK INTERVENTIONS

## 2022-10-26 NOTE — CONSULT NOTE ADULT - ATTENDING COMMENTS
57 year old female with metastatic ampullary carcinoma s/p Whipple in 2019, DM2, HTN presenting with abd pain, fever, chills. CT A/P with no acute findings, CT chest with interval decrease in size of left upper lobe/paramediastinal mass and adjacent metastatic disease. and interval development of new ground glass opacities in the left upper lobe, suggestive of infection in the setting of recent chemotherapy. Also endorsing odynophagia now started on fluconazole. CMV serum PCR negative.     Recommend:  #Sepsis (fever, tachycardia) secondary to YUNI PNA  -Continue vanco/ zosyn  -Monitor vanco trough  -Check MRSA nasal swab  -F/U sputum cx  -F/U blood cultures  -Monitor fever curve    #Odynophagia  -Continue fluconazole  -monitor for improvement    Feliciano Collins MD  Available through MS Teams  If no response, or after 5pm/weekends, call [phone]
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 pHTN (dx on imaging 7/21/22), ventral hernia repair cb left inferior epigastric pseudoaneurysm, T2DM a1c 7.4%, HTN presenting with fever and chills found to have ESBL E coli UTI with GI consulted for odynophagia/dysphagia, Spoke with daughter at bedside who assisted with translation services    Left pulmonary radiation  Ongoing dysphagia, small white patch seen at the Left lateral aspect Of the tongue  Differential diagnosis includes infectious process such as candidiasis, less likely to be HSV/CMV esophagitis, rule out radiation, rule out erosive esophagitis secondary to GERD    Recommendation  1.  PPI twice a day  2.  Soft foods as tolerated  3.  When medically optimized (Afebrile 48 hours), plan for upper endoscopy with biopsies

## 2022-10-26 NOTE — SWALLOW BEDSIDE ASSESSMENT ADULT - SWALLOW EVAL: DIAGNOSIS
1) Functional oral stage of swallow for soft and bite sized solids, puree, mildly thick fluids, and thin fluids marked by adequate oral containment, bolus manipulation, mastication, and coordination.  Anterior to posterior oral transit/transfer noted. Adequate oral clearance observed. 2) Functional pharyngeal stage of swallow for soft and bite sized solids, puree, mildly thick fluids, and thin fluids marked by initiation of pharyngeal swallow trigger and hyolaryngeal excursion noted upon digital palpation. No overt signs/symptoms of penetration/aspiration noted across consistencies. Patient reported mild pain when swallowing (pain scale 6/10).

## 2022-10-26 NOTE — CONSULT NOTE ADULT - ASSESSMENT
Ms. Lagunas is a 58 y/o F with PMH of metastatic ampullary carcinoma s/p Whipple in 2019, Type 2 DM, HTN who presented for 14 days of abdominal pain with associated fevers and chills. CT imaging consistent with gastritis and colitis likely 2/2 to radiation treatment vs infection. EBSL UTI with odynophagia with dysphagia etiology likely 2/2 radiation esophagitis versus infectious esophagitis including CMV, HSV, Candida. ID consulted for further recs.      #Sepsis 2/2 UTI vs colitis vs less likely gastritis. Maybe radiation colitis/ gastritis.  #Odynophagia and dysphagia 2/2 candidiasis vs HSV vs CMV vs radiation  - Febrile to 102.8 yesterday on 10/25. No leukocytosis .   -Pt is immunocompromised in the setting of malignancy, chemo and radiation. Ampullary carcinoma diagnosed in 2018, s/p Whipple procedure in 2019. Recent LN FNA in 7/2022 positive for metastatic disease, currently on biweekly chemotherapy Gemzar and Ambraxane; last session on 10/11. Completed 15 sessions of radiation on 10/14. Follows with [facility].   -CXR showing a YUNI consolidation. CT chest performed showing likely a post obstructive PNA.  -UA positive. UCx with E.coli resistant to cephalosporins.   -F/u BCx from 10/25  no growth     - Currently on vancomycin and zosyn   - Currently on Fluconazole empirically for candidiasis  - F/u Sputum cx     NOT FINAL NOTE. WILL DISCUSS WITH ATTENDING    Mitzy Wallace MD  PGY-3 FM Resident           Ms. Lagunas is a 56 y/o F with PMH of metastatic ampullary carcinoma s/p Whipple in 2019, Type 2 DM, HTN who presented abdominal pain with associated fevers and chills. CT imaging consistent with gastritis and colitis likely 2/2 to radiation treatment vs infection. EBSL bacteruria with odynophagia with dysphagia etiology likely 2/2 radiation esophagitis versus infectious esophagitis including CMV, HSV, Candida. ID consulted for further recs.      #Sepsis 2/2 YUNI PNA  #Odynophagia and dysphagia 2/2 candidiasis vs HSV vs CMV vs radiation  - Febrile to 102.8 on 10/25. No leukocytosis   -Pt is immunocompromised in the setting of malignancy, chemo and radiation. Ampullary carcinoma diagnosed in 2018, s/p Whipple procedure in 2019. Recent LN FNA in 7/2022 positive for metastatic disease, currently on biweekly chemotherapy Gemzar and Ambraxane; last session on 10/11. Completed 15 sessions of radiation on 10/14. Follows with [facility].   -CXR showing a YUNI consolidation. CT chest performed showing likely a post obstructive PNA.  -UA positive. UCx with ESBL but patient is asymptomatic  -F/u BCx from 10/25 no growth     RECOMENDATIONS:  - Currently on vancomycin and zosyn; Continue for now.   - Currently on Fluconazole empirically for candidiasis; continue with 200mgIV daily  - F/u Sputum cx   -Add MRSA swab        Mitzy Wallace MD  PGY-3 FM Resident

## 2022-10-26 NOTE — DIETITIAN NUTRITION RISK NOTIFICATION - TREATMENT: THE FOLLOWING DIET HAS BEEN RECOMMENDED
Diet, Soft and Bite Sized:   Jordin (10-26-22 @ 13:12) [Active]       Soft and Bite Sized: Consistent Carbohydrate (evening snacks); Halal (10-26-22 @ 13:12)  Add PO supplement: Glucerna Therapeutic Nutrition 8oz. 2x daily (~440 Kcals, ~20 gm Protein)

## 2022-10-26 NOTE — PROGRESS NOTE ADULT - SUBJECTIVE AND OBJECTIVE BOX
Hospitalist Progress Note  Aimee Barraza MD Pager # [phone]    OVERNIGHT EVENTS: JAILYN     SUBJECTIVE / INTERVAL HPI: Patient seen and examined at bedside. Daughter interprets to [facility] over the phone. Pt. reports that she continues to have coughing. She has not had a fever. She continues to have chest pain with swallowing/eating. All other ROS negative.     VITAL SIGNS:  Vital Signs Last 24 Hrs  T(C): 37 (26 Oct 2022 12:49), Max: 39.3 (25 Oct 2022 15:24)  T(F): 98.6 (26 Oct 2022 12:49), Max: 102.8 (25 Oct 2022 15:24)  HR: 95 (26 Oct 2022 12:49) (70 - 98)  BP: 114/65 (26 Oct 2022 12:49) (101/60 - 118/65)  BP(mean): --  RR: 18 (26 Oct 2022 12:49) (18 - 18)  SpO2: 96% (26 Oct 2022 12:49) (96% - 99%)    Parameters below as of 26 Oct 2022 12:49  Patient On (Oxygen Delivery Method): room air        PHYSICAL EXAM:    General: Ill appearing female resting in bed   HEENT: NC/AT; PERRL, anicteric sclera; MMM  Neck: supple  Cardiovascular: +S1/S2; RRR  Respiratory: CTA B/L; no W/R/R  Gastrointestinal: soft, NT/ND; +BSx4  Extremities: WWP; no edema, clubbing or cyanosis  Vascular: 2+ radial, DP/PT pulses B/L  Neurological: AAOx3; no focal deficits    MEDICATIONS:  MEDICATIONS  (STANDING):  atorvastatin 10 milliGRAM(s) Oral at bedtime  cholecalciferol 5000 Unit(s) Oral daily  dextrose 50% Injectable 25 Gram(s) IV Push once  dextrose 50% Injectable 12.5 Gram(s) IV Push once  dextrose 50% Injectable 25 Gram(s) IV Push once  dextrose Oral Gel 15 Gram(s) Oral once  enoxaparin Injectable 40 milliGRAM(s) SubCutaneous every 24 hours  gabapentin 300 milliGRAM(s) Oral two times a day  glucagon  Injectable 1 milliGRAM(s) IntraMuscular once  influenza   Vaccine 0.5 milliLiter(s) IntraMuscular once  insulin lispro (ADMELOG) corrective regimen sliding scale   SubCutaneous three times a day before meals  insulin lispro (ADMELOG) corrective regimen sliding scale   SubCutaneous at bedtime  montelukast 10 milliGRAM(s) Oral at bedtime  multivitamin 1 Tablet(s) Oral daily  naloxone Injectable 0.4 milliGRAM(s) IV Push once  ondansetron   Disintegrating Tablet 4 milliGRAM(s) Oral every 8 hours  pantoprazole   Suspension 40 milliGRAM(s) Oral before breakfast  piperacillin/tazobactam IVPB.. 3.375 Gram(s) IV Intermittent every 8 hours  polyethylene glycol 3350 17 Gram(s) Oral daily  potassium chloride  10 mEq/100 mL IVPB 10 milliEquivalent(s) IV Intermittent every 1 hour  senna 2 Tablet(s) Oral at bedtime  sodium chloride 0.9%. 1000 milliLiter(s) (100 mL/Hr) IV Continuous <Continuous>  sucralfate 1 Gram(s) Oral two times a day  vancomycin  IVPB 750 milliGRAM(s) IV Intermittent every 12 hours    MEDICATIONS  (PRN):  acetaminophen     Tablet .. 650 milliGRAM(s) Oral every 6 hours PRN Temp greater or equal to 38C (100.4F), Mild Pain (1 - 3)  benzonatate 100 milliGRAM(s) Oral three times a day PRN Cough  bisacodyl 5 milliGRAM(s) Oral daily PRN Constipation  guaiFENesin Oral Liquid (Sugar-Free) 100 milliGRAM(s) Oral every 6 hours PRN Cough  morphine  - Injectable 2 milliGRAM(s) IV Push every 4 hours PRN Moderate Pain (4 - 6)  morphine  - Injectable 4 milliGRAM(s) IV Push every 4 hours PRN Severe Pain (7 - 10)      ALLERGIES:  Allergies    No Known Allergies    Intolerances        LABS:                        8.0    5.78  )-----------( 178      ( 26 Oct 2022 05:48 )             25.6     10-26    134<L>  |  103  |  4<L>  ----------------------------<  136<H>  3.3<L>   |  23  |  0.53    Ca    7.9<L>      26 Oct 2022 05:48  Phos  2.3     10-26  Mg     1.90     10-26    TPro  6.2  /  Alb  2.5<L>  /  TBili  0.6  /  DBili  x   /  AST  32  /  ALT  17  /  AlkPhos  136<H>  10-26        CAPILLARY BLOOD GLUCOSE      POCT Blood Glucose.: 118 mg/dL (26 Oct 2022 11:44)      RADIOLOGY & ADDITIONAL TESTS: Reviewed.    ASSESSMENT:    PLAN:

## 2022-10-26 NOTE — DIETITIAN INITIAL EVALUATION ADULT - ADD RECOMMEND
1. Once clinically indicates, initiate PO diet;   2. Swallow Bedside Assessment Adult &/or MBS if warranted; PO Diet & Fluid consistency per SLP recommendation;   3. Once PO diet initiates, encourage/assist Pt with meals; Monitor PO diet tolerance;   4. Honor food preferences;  5. Monitor labs, hydration status;  1. Once clinically indicates, initiate PO diet; PO Diet & Fluid consistency per SLP recommendation;   2. Once PO diet initiates, encourage/assist Pt with meals; Monitor PO diet tolerance; Add PO supplement: Glucerna Therapeutic Nutrition 8oz. 2x daily (~440 Kcals, ~20 gm Protein);   3. Monitor labs, hydration status;

## 2022-10-26 NOTE — DIETITIAN INITIAL EVALUATION ADULT - PATIENT MEETS CRITERIA FOR MALNUTRITION
Arrived via EMS, no pain hx HTN non med compliant for a few years. Was shopping in [place], got dizzy and fainted, denies hitting head, no loss consciousness. EKG ST per EMS, fsbs 196.        Thinks she has a sinus infection due to living in a home with mildew and dust    States she feels weak due to cleaning her new house and not eating, thinks she has bronchitis
yes

## 2022-10-26 NOTE — SWALLOW BEDSIDE ASSESSMENT ADULT - ASR SWALLOW RECOMMEND DIAG
MD to consider Cinesophagram given patient with complaints of globus sensation and recent chest imaging indicating pneumonia/VFSS/MBS

## 2022-10-26 NOTE — SWALLOW BEDSIDE ASSESSMENT ADULT - ASR SWALLOW REFERRAL
GI consult given patient reporting globus sensation at the chest mid-sternum region to rule out an esophageal dysphagia component/Registered Dietitian

## 2022-10-27 ENCOUNTER — OUTPATIENT (OUTPATIENT)
Dept: OUTPATIENT SERVICES | Facility: HOSPITAL | Age: 57
LOS: 1 days | Discharge: HOME | End: 2022-10-27

## 2022-10-27 DIAGNOSIS — K92.2 GASTROINTESTINAL HEMORRHAGE, UNSPECIFIED: Chronic | ICD-10-CM

## 2022-10-27 DIAGNOSIS — Z98.890 OTHER SPECIFIED POSTPROCEDURAL STATES: Chronic | ICD-10-CM

## 2022-10-27 DIAGNOSIS — Z51.11 ENCOUNTER FOR ANTINEOPLASTIC CHEMOTHERAPY: Chronic | ICD-10-CM

## 2022-10-27 DIAGNOSIS — C24.1 MALIGNANT NEOPLASM OF AMPULLA OF VATER: ICD-10-CM

## 2022-10-27 DIAGNOSIS — Z90.410 ACQUIRED TOTAL ABSENCE OF PANCREAS: Chronic | ICD-10-CM

## 2022-10-27 LAB
ALBUMIN SERPL ELPH-MCNC: 2.5 G/DL — LOW (ref 3.3–5)
ALP SERPL-CCNC: 121 U/L — HIGH (ref 40–120)
ALT FLD-CCNC: 13 U/L — SIGNIFICANT CHANGE UP (ref 4–33)
ANION GAP SERPL CALC-SCNC: 9 MMOL/L — SIGNIFICANT CHANGE UP (ref 7–14)
AST SERPL-CCNC: 29 U/L — SIGNIFICANT CHANGE UP (ref 4–32)
BASOPHILS # BLD AUTO: 0.04 K/UL — SIGNIFICANT CHANGE UP (ref 0–0.2)
BASOPHILS NFR BLD AUTO: 0.7 % — SIGNIFICANT CHANGE UP (ref 0–2)
BILIRUB SERPL-MCNC: 0.6 MG/DL — SIGNIFICANT CHANGE UP (ref 0.2–1.2)
BUN SERPL-MCNC: 4 MG/DL — LOW (ref 7–23)
CALCIUM SERPL-MCNC: 8 MG/DL — LOW (ref 8.4–10.5)
CHLORIDE SERPL-SCNC: 105 MMOL/L — SIGNIFICANT CHANGE UP (ref 98–107)
CO2 SERPL-SCNC: 22 MMOL/L — SIGNIFICANT CHANGE UP (ref 22–31)
CREAT SERPL-MCNC: 0.59 MG/DL — SIGNIFICANT CHANGE UP (ref 0.5–1.3)
CULTURE RESULTS: SIGNIFICANT CHANGE UP
CULTURE RESULTS: SIGNIFICANT CHANGE UP
EGFR: 106 ML/MIN/1.73M2 — SIGNIFICANT CHANGE UP
EOSINOPHIL # BLD AUTO: 0.27 K/UL — SIGNIFICANT CHANGE UP (ref 0–0.5)
EOSINOPHIL NFR BLD AUTO: 4.6 % — SIGNIFICANT CHANGE UP (ref 0–6)
GLUCOSE BLDC GLUCOMTR-MCNC: 114 MG/DL — HIGH (ref 70–99)
GLUCOSE BLDC GLUCOMTR-MCNC: 136 MG/DL — HIGH (ref 70–99)
GLUCOSE BLDC GLUCOMTR-MCNC: 143 MG/DL — HIGH (ref 70–99)
GLUCOSE BLDC GLUCOMTR-MCNC: 168 MG/DL — HIGH (ref 70–99)
GLUCOSE SERPL-MCNC: 111 MG/DL — HIGH (ref 70–99)
GRAM STN FLD: SIGNIFICANT CHANGE UP
HCT VFR BLD CALC: 24.9 % — LOW (ref 34.5–45)
HGB BLD-MCNC: 8.2 G/DL — LOW (ref 11.5–15.5)
IANC: 3.73 K/UL — SIGNIFICANT CHANGE UP (ref 1.8–7.4)
IMM GRANULOCYTES NFR BLD AUTO: 2.4 % — HIGH (ref 0–0.9)
LYMPHOCYTES # BLD AUTO: 0.62 K/UL — LOW (ref 1–3.3)
LYMPHOCYTES # BLD AUTO: 10.6 % — LOW (ref 13–44)
MAGNESIUM SERPL-MCNC: 1.9 MG/DL — SIGNIFICANT CHANGE UP (ref 1.6–2.6)
MCHC RBC-ENTMCNC: 24.6 PG — LOW (ref 27–34)
MCHC RBC-ENTMCNC: 32.9 GM/DL — SIGNIFICANT CHANGE UP (ref 32–36)
MCV RBC AUTO: 74.6 FL — LOW (ref 80–100)
MONOCYTES # BLD AUTO: 1.04 K/UL — HIGH (ref 0–0.9)
MONOCYTES NFR BLD AUTO: 17.8 % — HIGH (ref 2–14)
MRSA PCR RESULT.: SIGNIFICANT CHANGE UP
NEUTROPHILS # BLD AUTO: 3.73 K/UL — SIGNIFICANT CHANGE UP (ref 1.8–7.4)
NEUTROPHILS NFR BLD AUTO: 63.9 % — SIGNIFICANT CHANGE UP (ref 43–77)
NRBC # BLD: 0 /100 WBCS — SIGNIFICANT CHANGE UP (ref 0–0)
NRBC # FLD: 0.02 K/UL — HIGH (ref 0–0)
PHOSPHATE SERPL-MCNC: 2.5 MG/DL — SIGNIFICANT CHANGE UP (ref 2.5–4.5)
PLATELET # BLD AUTO: 190 K/UL — SIGNIFICANT CHANGE UP (ref 150–400)
POTASSIUM SERPL-MCNC: 3.7 MMOL/L — SIGNIFICANT CHANGE UP (ref 3.5–5.3)
POTASSIUM SERPL-SCNC: 3.7 MMOL/L — SIGNIFICANT CHANGE UP (ref 3.5–5.3)
PROT SERPL-MCNC: 6 G/DL — SIGNIFICANT CHANGE UP (ref 6–8.3)
RBC # BLD: 3.34 M/UL — LOW (ref 3.8–5.2)
RBC # FLD: 17.7 % — HIGH (ref 10.3–14.5)
S AUREUS DNA NOSE QL NAA+PROBE: SIGNIFICANT CHANGE UP
SODIUM SERPL-SCNC: 136 MMOL/L — SIGNIFICANT CHANGE UP (ref 135–145)
SPECIMEN SOURCE: SIGNIFICANT CHANGE UP
VANCOMYCIN TROUGH SERPL-MCNC: 8.9 UG/ML — LOW (ref 10–20)
WBC # BLD: 5.84 K/UL — SIGNIFICANT CHANGE UP (ref 3.8–10.5)
WBC # FLD AUTO: 5.84 K/UL — SIGNIFICANT CHANGE UP (ref 3.8–10.5)

## 2022-10-27 PROCEDURE — 99232 SBSQ HOSP IP/OBS MODERATE 35: CPT | Mod: GC

## 2022-10-27 PROCEDURE — 99223 1ST HOSP IP/OBS HIGH 75: CPT

## 2022-10-27 PROCEDURE — 99232 SBSQ HOSP IP/OBS MODERATE 35: CPT

## 2022-10-27 RX ORDER — MORPHINE SULFATE 50 MG/1
4 CAPSULE, EXTENDED RELEASE ORAL EVERY 4 HOURS
Refills: 0 | Status: DISCONTINUED | OUTPATIENT
Start: 2022-10-27 | End: 2022-11-01

## 2022-10-27 RX ORDER — DIPHENHYDRAMINE HYDROCHLORIDE AND LIDOCAINE HYDROCHLORIDE AND ALUMINUM HYDROXIDE AND MAGNESIUM HYDRO
15 KIT EVERY 8 HOURS
Refills: 0 | Status: DISCONTINUED | OUTPATIENT
Start: 2022-10-27 | End: 2022-11-01

## 2022-10-27 RX ORDER — VANCOMYCIN HCL 1 G
1000 VIAL (EA) INTRAVENOUS EVERY 12 HOURS
Refills: 0 | Status: DISCONTINUED | OUTPATIENT
Start: 2022-10-27 | End: 2022-10-27

## 2022-10-27 RX ORDER — MORPHINE SULFATE 50 MG/1
2 CAPSULE, EXTENDED RELEASE ORAL EVERY 4 HOURS
Refills: 0 | Status: DISCONTINUED | OUTPATIENT
Start: 2022-10-27 | End: 2022-11-01

## 2022-10-27 RX ADMIN — Medication 1: at 12:16

## 2022-10-27 RX ADMIN — Medication 5000 UNIT(S): at 15:30

## 2022-10-27 RX ADMIN — PIPERACILLIN AND TAZOBACTAM 25 GRAM(S): 4; .5 INJECTION, POWDER, LYOPHILIZED, FOR SOLUTION INTRAVENOUS at 22:21

## 2022-10-27 RX ADMIN — Medication 1 GRAM(S): at 05:18

## 2022-10-27 RX ADMIN — SODIUM CHLORIDE 100 MILLILITER(S): 9 INJECTION INTRAMUSCULAR; INTRAVENOUS; SUBCUTANEOUS at 05:10

## 2022-10-27 RX ADMIN — PIPERACILLIN AND TAZOBACTAM 25 GRAM(S): 4; .5 INJECTION, POWDER, LYOPHILIZED, FOR SOLUTION INTRAVENOUS at 15:29

## 2022-10-27 RX ADMIN — SODIUM CHLORIDE 100 MILLILITER(S): 9 INJECTION INTRAMUSCULAR; INTRAVENOUS; SUBCUTANEOUS at 22:21

## 2022-10-27 RX ADMIN — ONDANSETRON 4 MILLIGRAM(S): 8 TABLET, FILM COATED ORAL at 15:30

## 2022-10-27 RX ADMIN — GABAPENTIN 300 MILLIGRAM(S): 400 CAPSULE ORAL at 17:41

## 2022-10-27 RX ADMIN — SENNA PLUS 2 TABLET(S): 8.6 TABLET ORAL at 22:19

## 2022-10-27 RX ADMIN — MONTELUKAST 10 MILLIGRAM(S): 4 TABLET, CHEWABLE ORAL at 22:34

## 2022-10-27 RX ADMIN — FLUCONAZOLE 100 MILLIGRAM(S): 150 TABLET ORAL at 02:35

## 2022-10-27 RX ADMIN — GABAPENTIN 300 MILLIGRAM(S): 400 CAPSULE ORAL at 05:18

## 2022-10-27 RX ADMIN — PANTOPRAZOLE SODIUM 40 MILLIGRAM(S): 20 TABLET, DELAYED RELEASE ORAL at 05:17

## 2022-10-27 RX ADMIN — ENOXAPARIN SODIUM 40 MILLIGRAM(S): 100 INJECTION SUBCUTANEOUS at 15:30

## 2022-10-27 RX ADMIN — ONDANSETRON 4 MILLIGRAM(S): 8 TABLET, FILM COATED ORAL at 22:19

## 2022-10-27 RX ADMIN — PIPERACILLIN AND TAZOBACTAM 25 GRAM(S): 4; .5 INJECTION, POWDER, LYOPHILIZED, FOR SOLUTION INTRAVENOUS at 05:11

## 2022-10-27 RX ADMIN — Medication 1 TABLET(S): at 15:30

## 2022-10-27 RX ADMIN — Medication 1 GRAM(S): at 17:40

## 2022-10-27 RX ADMIN — Medication 250 MILLIGRAM(S): at 00:46

## 2022-10-27 RX ADMIN — SODIUM CHLORIDE 100 MILLILITER(S): 9 INJECTION INTRAMUSCULAR; INTRAVENOUS; SUBCUTANEOUS at 11:08

## 2022-10-27 RX ADMIN — ONDANSETRON 4 MILLIGRAM(S): 8 TABLET, FILM COATED ORAL at 05:18

## 2022-10-27 RX ADMIN — Medication 100 MILLIGRAM(S): at 15:34

## 2022-10-27 NOTE — PROGRESS NOTE ADULT - ATTENDING COMMENTS
57 year old female with metastatic ampullary carcinoma s/p Whipple in 2019, DM2, HTN presenting with abd pain, fever, chills. CT A/P with no acute findings, CT chest with interval decrease in size of left upper lobe/paramediastinal mass and adjacent metastatic disease. and interval development of new ground glass opacities in the left upper lobe, suggestive of infection in the setting of recent chemotherapy. Also endorsing odynophagia now started on fluconazole. CMV serum PCR negative.     Recommend:  #Sepsis (fever, tachycardia) secondary to YUNI PNA  -Continue zosyn for likely post obstructive pna  -MRSA nasal swab negative. Can dc vanco. Also no GPC clusters in sputum cx.  -F/U sputum cx to final  -F/U blood cultures  -Monitor fever curve    #Odynophagia  -improving  -Continue fluconazole    Feliciano Collins MD  Available through MS Teams  If no response, or after 5pm/weekends, call [phone]

## 2022-10-27 NOTE — PROGRESS NOTE ADULT - PROBLEM SELECTOR PLAN 2
Odynophagia with dysphagia to solids and liquids 2/2 pain. 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 Tylenol does not help to relieve the pain. Morphine 4mg IV made her dizzy.  - C/w morphine 2mg IV q4 PRN   - S&S consult - not aspirating  - Soft diet for now in the setting of pain. Counseled family on low acidity foods for now.  - C/w Fluconazole   - Hold statin while on fluconazole (on atorvastatin 10mgqd at home).   - ID consulted, f/u recommendations.

## 2022-10-27 NOTE — PROGRESS NOTE ADULT - PROBLEM SELECTOR PLAN 1
Sepsis 2/2 UTI. UA positive. Febrile to 102.8 yesterday. No leukocytosis. Pt. is immunocompromised in the setting of malignancy, chemo and radiation. CXR showing a YUNI consolidation. CT chest performed showing a post-obstructive PNA. Broadened antibiotics. No fever since last night.   - UCx with E.coli resistant to cephalosporins. Given, ongoing fever will continue to treat with broad spectrum antibiotics (zosyn).   - F/u BCx from 10/25 - NGTD   - MRSA swab negative - d/c vanc  - C/w Zosyn    #Post obstructive PNA   - CT chest with YUNI PNA. Respiratory status stable. Not requiring O2. ID consulted. Initially broadened to vanc/zosyn. D/c'ed vanc. C/w Zosyn.

## 2022-10-27 NOTE — CONSULT NOTE ADULT - SUBJECTIVE AND OBJECTIVE BOX
No Patient is a 57y old  Female who presents with a chief complaint of fevers and chills (27 Oct 2022 09:16)      HPI:  Ms. Lagunas is a 56 y/o F with pmhx of metastatic ampullary carcinoma s/p Whipple in 2019, Type 2 DM, HTN who presents for 4 days of abdominal pain with associated fevers and chills. Daughter at bedside provide translation as patient is Wolof speaking. Patient states that sxs started last week after her chemo and radiation sessions. She endorsed upper quadrant abdominal pain that is worsened with eating foods both solids and liquids. She endorses burning sensation in the middle of her chest after eating. Denies any nausea, vomiting, hematemesis, hematochezia diarrhea . Last BM was 2 days ago. Daughter that patient has lost 2 lbs in last week, usually around 103-105 lbs. Patient also feeling more fatigued than baseline and with minimal appetite. Patient was prescribed sucralfate mixed with lidocaine and oxycodone 5mg but it only decreased pain minimally. Alos endorsing pain with swallowing, feels like a stuck feeling in her chest, " like a chest tightness"  Over the last 24 hours daughter states that Tmax at home was 105.1 orally. She called her provider at [facility] and was told to come to the ED.     Patient admitted back in July for YUNI opacity and received bronchoscopy. Results were negative for mets to lung but were positive fro mets to lymph nodes with pancreatobiliary primary. She currently received chemotherapy biweekly, last session on Oct 11. Currently on Gemzar and Abraxane. Patient has recently completed 15 back to back sessions of radiation, last session was 10/14.      In ED, Tmax 100.1. CT A/P showed possible gastritis and possible skipped colitis. Received 1 dose of vanc and cefepime. Admitted to medicine for further management.  (22 Oct 2022 08:48)       Oncologic History:      ROS: as above     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      H/O Whipple procedure  1/11/2019      Hemorrhage of gastroduodenal artery  s/p surgery 1/18/19      H/O drainage of abscess  and abdominal wall repair 1/21/19      H/O eye surgery      Admission for chemotherapy  right chest wall port          SOCIAL HISTORY:    FAMILY HISTORY:  Family history of diabetes mellitus (DM) (Sibling)    FH: HTN (hypertension) (Sibling)        MEDICATIONS  (STANDING):  cholecalciferol 5000 Unit(s) Oral daily  dextrose 50% Injectable 25 Gram(s) IV Push once  dextrose 50% Injectable 12.5 Gram(s) IV Push once  dextrose 50% Injectable 25 Gram(s) IV Push once  dextrose Oral Gel 15 Gram(s) Oral once  enoxaparin Injectable 40 milliGRAM(s) SubCutaneous every 24 hours  fluconAZOLE IVPB 200 milliGRAM(s) IV Intermittent every 24 hours  gabapentin 300 milliGRAM(s) Oral two times a day  glucagon  Injectable 1 milliGRAM(s) IntraMuscular once  influenza   Vaccine 0.5 milliLiter(s) IntraMuscular once  insulin lispro (ADMELOG) corrective regimen sliding scale   SubCutaneous three times a day before meals  insulin lispro (ADMELOG) corrective regimen sliding scale   SubCutaneous at bedtime  montelukast 10 milliGRAM(s) Oral at bedtime  multivitamin 1 Tablet(s) Oral daily  naloxone Injectable 0.4 milliGRAM(s) IV Push once  ondansetron   Disintegrating Tablet 4 milliGRAM(s) Oral every 8 hours  pantoprazole   Suspension 40 milliGRAM(s) Oral before breakfast  piperacillin/tazobactam IVPB.. 3.375 Gram(s) IV Intermittent every 8 hours  polyethylene glycol 3350 17 Gram(s) Oral daily  senna 2 Tablet(s) Oral at bedtime  sodium chloride 0.9%. 1000 milliLiter(s) (100 mL/Hr) IV Continuous <Continuous>  sucralfate 1 Gram(s) Oral two times a day  vancomycin  IVPB 1000 milliGRAM(s) IV Intermittent every 12 hours    MEDICATIONS  (PRN):  acetaminophen     Tablet .. 650 milliGRAM(s) Oral every 6 hours PRN Temp greater or equal to 38C (100.4F), Mild Pain (1 - 3)  benzonatate 100 milliGRAM(s) Oral three times a day PRN Cough  bisacodyl 5 milliGRAM(s) Oral daily PRN Constipation  guaiFENesin Oral Liquid (Sugar-Free) 100 milliGRAM(s) Oral every 6 hours PRN Cough  morphine  - Injectable 2 milliGRAM(s) IV Push every 4 hours PRN Moderate Pain (4 - 6)  morphine  - Injectable 4 milliGRAM(s) IV Push every 4 hours PRN Severe Pain (7 - 10)      Allergies    No Known Allergies    Intolerances        Vital Signs Last 24 Hrs  T(C): 37.3 (27 Oct 2022 05:10), Max: 37.4 (26 Oct 2022 21:41)  T(F): 99.1 (27 Oct 2022 05:10), Max: 99.4 (26 Oct 2022 21:41)  HR: 88 (27 Oct 2022 05:10) (88 - 95)  BP: 129/66 (27 Oct 2022 05:10) (114/65 - 129/66)  BP(mean): --  RR: 18 (27 Oct 2022 05:10) (18 - 18)  SpO2: 100% (27 Oct 2022 05:10) (96% - 100%)    Parameters below as of 27 Oct 2022 05:10  Patient On (Oxygen Delivery Method): room air        PHYSICAL EXAM  General: adult in NAD  HEENT: clear oropharynx, anicteric sclera, pink conjunctiva  Neck: supple  CV: normal S1/S2 with no murmur rubs or gallops  Lungs: positive air movement b/l ant lungs, clear to auscultation, no wheezes, no rales  Abdomen: soft non-tender non-distended, no hepatosplenomegaly  Ext: no clubbing cyanosis or edema  Skin: no rashes and no petechiae  Neuro: alert and oriented X 3, none focal    LABS:                          8.2    5.84  )-----------( 190      ( 27 Oct 2022 06:15 )             24.9         Mean Cell Volume : 74.6 fL  Mean Cell Hemoglobin : 24.6 pg  Mean Cell Hemoglobin Concentration : 32.9 gm/dL  Auto Neutrophil # : x  Auto Lymphocyte # : x  Auto Monocyte # : x  Auto Eosinophil # : x  Auto Basophil # : x  Auto Neutrophil % : x  Auto Lymphocyte % : x  Auto Monocyte % : x  Auto Eosinophil % : x  Auto Basophil % : x      Serial CBC's  10-27 @ 06:15  Hct-24.9 / Hgb-8.2 / Plat-190 / RBC-3.34 / WBC-5.84  Serial CBC's  10-26 @ 05:48  Hct-25.6 / Hgb-8.0 / Plat-178 / RBC-3.40 / WBC-5.78  Serial CBC's  10-25 @ 07:20  Hct-25.7 / Hgb-8.1 / Plat-159 / RBC-3.42 / WBC-4.87      10-27    136  |  105  |  4<L>  ----------------------------<  111<H>  3.7   |  22  |  0.59    Ca    8.0<L>      27 Oct 2022 06:15  Phos  2.5     10-27  Mg     1.90     10-27    TPro  6.0  /  Alb  2.5<L>  /  TBili  0.6  /  DBili  x   /  AST  29  /  ALT  13  /  AlkPhos  121<H>  10-27                      RADIOLOGY & ADDITIONAL STUDIES:     Patient is a 57y old  Female who presents with a chief complaint of fevers and chills (27 Oct 2022 09:16)      HPI:  Ms. Lagunas is a 56 y/o F with pmhx of metastatic ampullary carcinoma s/p Whipple in 2019, Type 2 DM, HTN who presents for 4 days of abdominal pain with associated fevers and chills. Daughter at bedside provide translation as patient is Uzbek speaking. Patient states that sxs started last week after her chemo and radiation sessions. She endorsed upper quadrant abdominal pain that is worsened with eating foods both solids and liquids. She endorses burning sensation in the middle of her chest after eating. Denies any nausea, vomiting, hematemesis, hematochezia diarrhea . Last BM was 2 days ago. Daughter that patient has lost 2 lbs in last week, usually around 103-105 lbs. Patient also feeling more fatigued than baseline and with minimal appetite. Patient was prescribed sucralfate mixed with lidocaine and oxycodone 5mg but it only decreased pain minimally. Alos endorsing pain with swallowing, feels like a stuck feeling in her chest, " like a chest tightness"  Over the last 24 hours daughter states that Tmax at home was 105.1 orally. She called her provider at [facility] and was told to come to the ED.     Patient admitted back in July for YUNI opacity and received bronchoscopy. Results were negative for mets to lung but were positive fro mets to lymph nodes with pancreatobiliary primary. She currently received chemotherapy biweekly, last session on Oct 11. Currently on Gemzar and Abraxane. Patient has recently completed 15 back to back sessions of radiation, last session was 10/14.      In ED, Tmax 100.1. CT A/P showed possible gastritis and possible skipped colitis. Received 1 dose of vanc and cefepime. Admitted to medicine for further management.  (22 Oct 2022 08:48)     ROS: as above     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      H/O Whipple procedure  1/11/2019      Hemorrhage of gastroduodenal artery  s/p surgery 1/18/19      H/O drainage of abscess  and abdominal wall repair 1/21/19      H/O eye surgery      Admission for chemotherapy  right chest wall port          SOCIAL HISTORY:    FAMILY HISTORY:  Family history of diabetes mellitus (DM) (Sibling)    FH: HTN (hypertension) (Sibling)        MEDICATIONS  (STANDING):  cholecalciferol 5000 Unit(s) Oral daily  dextrose 50% Injectable 25 Gram(s) IV Push once  dextrose 50% Injectable 12.5 Gram(s) IV Push once  dextrose 50% Injectable 25 Gram(s) IV Push once  dextrose Oral Gel 15 Gram(s) Oral once  enoxaparin Injectable 40 milliGRAM(s) SubCutaneous every 24 hours  fluconAZOLE IVPB 200 milliGRAM(s) IV Intermittent every 24 hours  gabapentin 300 milliGRAM(s) Oral two times a day  glucagon  Injectable 1 milliGRAM(s) IntraMuscular once  influenza   Vaccine 0.5 milliLiter(s) IntraMuscular once  insulin lispro (ADMELOG) corrective regimen sliding scale   SubCutaneous three times a day before meals  insulin lispro (ADMELOG) corrective regimen sliding scale   SubCutaneous at bedtime  montelukast 10 milliGRAM(s) Oral at bedtime  multivitamin 1 Tablet(s) Oral daily  naloxone Injectable 0.4 milliGRAM(s) IV Push once  ondansetron   Disintegrating Tablet 4 milliGRAM(s) Oral every 8 hours  pantoprazole   Suspension 40 milliGRAM(s) Oral before breakfast  piperacillin/tazobactam IVPB.. 3.375 Gram(s) IV Intermittent every 8 hours  polyethylene glycol 3350 17 Gram(s) Oral daily  senna 2 Tablet(s) Oral at bedtime  sodium chloride 0.9%. 1000 milliLiter(s) (100 mL/Hr) IV Continuous <Continuous>  sucralfate 1 Gram(s) Oral two times a day  vancomycin  IVPB 1000 milliGRAM(s) IV Intermittent every 12 hours    MEDICATIONS  (PRN):  acetaminophen     Tablet .. 650 milliGRAM(s) Oral every 6 hours PRN Temp greater or equal to 38C (100.4F), Mild Pain (1 - 3)  benzonatate 100 milliGRAM(s) Oral three times a day PRN Cough  bisacodyl 5 milliGRAM(s) Oral daily PRN Constipation  guaiFENesin Oral Liquid (Sugar-Free) 100 milliGRAM(s) Oral every 6 hours PRN Cough  morphine  - Injectable 2 milliGRAM(s) IV Push every 4 hours PRN Moderate Pain (4 - 6)  morphine  - Injectable 4 milliGRAM(s) IV Push every 4 hours PRN Severe Pain (7 - 10)      Allergies    No Known Allergies    Intolerances        Vital Signs Last 24 Hrs  T(C): 37.3 (27 Oct 2022 05:10), Max: 37.4 (26 Oct 2022 21:41)  T(F): 99.1 (27 Oct 2022 05:10), Max: 99.4 (26 Oct 2022 21:41)  HR: 88 (27 Oct 2022 05:10) (88 - 95)  BP: 129/66 (27 Oct 2022 05:10) (114/65 - 129/66)  BP(mean): --  RR: 18 (27 Oct 2022 05:10) (18 - 18)  SpO2: 100% (27 Oct 2022 05:10) (96% - 100%)    Parameters below as of 27 Oct 2022 05:10  Patient On (Oxygen Delivery Method): room air        PHYSICAL EXAM  General: adult in NAD  HEENT: clear oropharynx, anicteric sclera, pink conjunctiva  Neck: supple  CV: normal S1/S2 with no murmur rubs or gallops  Lungs: positive air movement b/l ant lungs, clear to auscultation, no wheezes, no rales  Abdomen: soft non-tender non-distended, no hepatosplenomegaly  Ext: no clubbing cyanosis or edema  Skin: no rashes and no petechiae  Neuro: alert and oriented X 3, none focal    LABS:                          8.2    5.84  )-----------( 190      ( 27 Oct 2022 06:15 )             24.9         Mean Cell Volume : 74.6 fL  Mean Cell Hemoglobin : 24.6 pg  Mean Cell Hemoglobin Concentration : 32.9 gm/dL  Auto Neutrophil # : x  Auto Lymphocyte # : x  Auto Monocyte # : x  Auto Eosinophil # : x  Auto Basophil # : x  Auto Neutrophil % : x  Auto Lymphocyte % : x  Auto Monocyte % : x  Auto Eosinophil % : x  Auto Basophil % : x      Serial CBC's  10-27 @ 06:15  Hct-24.9 / Hgb-8.2 / Plat-190 / RBC-3.34 / WBC-5.84  Serial CBC's  10-26 @ 05:48  Hct-25.6 / Hgb-8.0 / Plat-178 / RBC-3.40 / WBC-5.78  Serial CBC's  10-25 @ 07:20  Hct-25.7 / Hgb-8.1 / Plat-159 / RBC-3.42 / WBC-4.87      10-27    136  |  105  |  4<L>  ----------------------------<  111<H>  3.7   |  22  |  0.59    Ca    8.0<L>      27 Oct 2022 06:15  Phos  2.5     10-27  Mg     1.90     10-27    TPro  6.0  /  Alb  2.5<L>  /  TBili  0.6  /  DBili  x   /  AST  29  /  ALT  13  /  AlkPhos  121<H>  10-27        RADIOLOGY & ADDITIONAL STUDIES:

## 2022-10-27 NOTE — PROGRESS NOTE ADULT - SUBJECTIVE AND OBJECTIVE BOX
Hospitalist Progress Note  Aimee Barraza MD Pager # [phone]    OVERNIGHT EVENTS: JAILYN    SUBJECTIVE / INTERVAL HPI: Patient seen and examined at bedside. Pt. says she is feeling better today. She says her throat pain is better and she's having less pain with swallowing.     VITAL SIGNS:  Vital Signs Last 24 Hrs  T(C): 36.9 (27 Oct 2022 12:59), Max: 37.4 (26 Oct 2022 21:41)  T(F): 98.4 (27 Oct 2022 12:59), Max: 99.4 (26 Oct 2022 21:41)  HR: 88 (27 Oct 2022 12:59) (88 - 91)  BP: 119/68 (27 Oct 2022 12:59) (119/68 - 129/66)  BP(mean): --  RR: 17 (27 Oct 2022 12:59) (17 - 18)  SpO2: 100% (27 Oct 2022 12:59) (99% - 100%)    Parameters below as of 27 Oct 2022 12:59  Patient On (Oxygen Delivery Method): room air        PHYSICAL EXAM:    General: Appears older than stated age - comfortable.   HEENT: NC/AT; PERRL, anicteric sclera; MMM. No oral lesions seen.   Neck: supple  Cardiovascular: +S1/S2; RRR  Respiratory: CTA B/L; no W/R/R  Gastrointestinal: soft, NT/ND; +BSx4  Extremities: WWP; no edema, clubbing or cyanosis  Vascular: 2+ radial, DP/PT pulses B/L  Neurological: AAOx3; no focal deficits    MEDICATIONS:  MEDICATIONS  (STANDING):  cholecalciferol 5000 Unit(s) Oral daily  dextrose 50% Injectable 25 Gram(s) IV Push once  dextrose 50% Injectable 12.5 Gram(s) IV Push once  dextrose 50% Injectable 25 Gram(s) IV Push once  dextrose Oral Gel 15 Gram(s) Oral once  enoxaparin Injectable 40 milliGRAM(s) SubCutaneous every 24 hours  fluconAZOLE IVPB 200 milliGRAM(s) IV Intermittent every 24 hours  gabapentin 300 milliGRAM(s) Oral two times a day  glucagon  Injectable 1 milliGRAM(s) IntraMuscular once  influenza   Vaccine 0.5 milliLiter(s) IntraMuscular once  insulin lispro (ADMELOG) corrective regimen sliding scale   SubCutaneous three times a day before meals  insulin lispro (ADMELOG) corrective regimen sliding scale   SubCutaneous at bedtime  montelukast 10 milliGRAM(s) Oral at bedtime  multivitamin 1 Tablet(s) Oral daily  naloxone Injectable 0.4 milliGRAM(s) IV Push once  ondansetron   Disintegrating Tablet 4 milliGRAM(s) Oral every 8 hours  pantoprazole   Suspension 40 milliGRAM(s) Oral before breakfast  piperacillin/tazobactam IVPB.. 3.375 Gram(s) IV Intermittent every 8 hours  polyethylene glycol 3350 17 Gram(s) Oral daily  senna 2 Tablet(s) Oral at bedtime  sodium chloride 0.9%. 1000 milliLiter(s) (100 mL/Hr) IV Continuous <Continuous>  sucralfate 1 Gram(s) Oral two times a day    MEDICATIONS  (PRN):  acetaminophen     Tablet .. 650 milliGRAM(s) Oral every 6 hours PRN Temp greater or equal to 38C (100.4F), Mild Pain (1 - 3)  benzonatate 100 milliGRAM(s) Oral three times a day PRN Cough  bisacodyl 5 milliGRAM(s) Oral daily PRN Constipation  guaiFENesin Oral Liquid (Sugar-Free) 100 milliGRAM(s) Oral every 6 hours PRN Cough  morphine  - Injectable 2 milliGRAM(s) IV Push every 4 hours PRN Moderate Pain (4 - 6)  morphine  - Injectable 4 milliGRAM(s) IV Push every 4 hours PRN Severe Pain (7 - 10)      ALLERGIES:  Allergies    No Known Allergies    Intolerances        LABS:                        8.2    5.84  )-----------( 190      ( 27 Oct 2022 06:15 )             24.9     10-27    136  |  105  |  4<L>  ----------------------------<  111<H>  3.7   |  22  |  0.59    Ca    8.0<L>      27 Oct 2022 06:15  Phos  2.5     10-27  Mg     1.90     10-27    TPro  6.0  /  Alb  2.5<L>  /  TBili  0.6  /  DBili  x   /  AST  29  /  ALT  13  /  AlkPhos  121<H>  10-27        CAPILLARY BLOOD GLUCOSE      POCT Blood Glucose.: 168 mg/dL (27 Oct 2022 12:13)      RADIOLOGY & ADDITIONAL TESTS: Reviewed.    ASSESSMENT:    PLAN:

## 2022-10-27 NOTE — CONSULT NOTE ADULT - ASSESSMENT
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   Last chemo was on 10/11/22, also has received 15 fractions of RT to lung met.    CT chest  IMPRESSION:  Interval decrease in size of left upper lobe/paramediastinal mass and   adjacent metastatic disease.  Interval development of new ground glass opacities in the left upper   lobe, suggestive of infection in the setting of recent chemotherapy.      CT chest with post obstructive PNA  UA + ecoli resistant to cephalosporin    Of note, pt is s/p 15 fx/4500 cGy RT to right lung, completed 10/11.   ?pneumonitis from RT    -ID input appreciated, on zosyn  -Radiation esophagitis, consider magic mouthwash, sucralfate   -If pain uncontrolled, consider pal care consult.  -Chemo on hold pending resolution of infections.  -No plans for inpatient treatment  -Supportive care, pain control, Nutrition, PT, DVT ppx  -Outpatient oncology f/u    Will follow. Please do not hesitate to call back with questions.     Alejandrina Cronin MD  Medical Oncology Attending  C: [phone]     time spent on direct patient care, interdisciplinary discussions and chart review.

## 2022-10-27 NOTE — PROGRESS NOTE ADULT - SUBJECTIVE AND OBJECTIVE BOX
Follow Up:      Patient is a 57y old  Female who presents with a chief complaint of fevers and chills (27 Oct 2022 09:52)    ID following for YUNI PNA.    Interval History/ROS: Patient still continues to complain of painful swallowing but was able to eat something this morning. She continues to feel better but weak.     REVIEW OF SYSTEMS  [  ] ROS unobtainable because:    [ x ] All other systems negative except as noted below    Constitutional:  [ ] fever [ ] chills  [ ] weight loss  [ ]night sweat  [ ]poor appetite/PO intake [ ]fatigue   Skin:  [ ] rash [ ] phlebitis	  Eyes: [ ] icterus [ ] pain  [ ] discharge	  ENMT: [ ] sore throat  [ ] thrush [ ] ulcers [ ] exudates [ ]anosmia  Respiratory: [ ] dyspnea [ ] hemoptysis [ ] cough [ ] sputum	  Cardiovascular:  [ ] chest pain [ ] palpitations [ ] edema	  Gastrointestinal:  [ ] nausea [ ] vomiting [ ] diarrhea [ ] constipation [x ] pain	  Genitourinary:  [ ] dysuria [ ] frequency [ ] hematuria [ ] discharge [ ] flank pain  [ ] incontinence  Musculoskeletal:  [ ] myalgias [ ] arthralgias [ ] arthritis  [ ] back pain  Neurological:  [ ] headache [ ] weakness [ ] seizures  [ ] confusion/altered mental status    Allergies  No Known Allergies    ANTIMICROBIALS:    fluconAZOLE IVPB 200 every 24 hours  piperacillin/tazobactam IVPB.. 3.375 every 8 hours  vancomycin  IVPB 1000 every 12 hours    OTHER MEDS: MEDICATIONS  (STANDING):  acetaminophen     Tablet .. 650 every 6 hours PRN  benzonatate 100 three times a day PRN  bisacodyl 5 daily PRN  dextrose 50% Injectable 25 once  dextrose 50% Injectable 12.5 once  dextrose 50% Injectable 25 once  dextrose Oral Gel 15 once  enoxaparin Injectable 40 every 24 hours  gabapentin 300 two times a day  glucagon  Injectable 1 once  guaiFENesin Oral Liquid (Sugar-Free) 100 every 6 hours PRN  influenza   Vaccine 0.5 once  insulin lispro (ADMELOG) corrective regimen sliding scale  three times a day before meals  insulin lispro (ADMELOG) corrective regimen sliding scale  at bedtime  montelukast 10 at bedtime  morphine  - Injectable 2 every 4 hours PRN  morphine  - Injectable 4 every 4 hours PRN  ondansetron   Disintegrating Tablet 4 every 8 hours  pantoprazole   Suspension 40 before breakfast  polyethylene glycol 3350 17 daily  senna 2 at bedtime  sucralfate 1 two times a day    Vital Signs Last 24 Hrs  T(F): 99.1 (10-27-22 @ 05:10), Max: 102.8 (10-25-22 @ 15:24)    Vital Signs Last 24 Hrs  HR: 88 (10-27-22 @ 05:10) (88 - 95)  BP: 129/66 (10-27-22 @ 05:10) (114/65 - 129/66)  RR: 18 (10-27-22 @ 05:10)  SpO2: 100% (10-27-22 @ 05:10) (96% - 100%)  Wt(kg): --    EXAM:    GA: NAD  HEENT: oral cavity no lesion  CV: nl S1/S2, no RMG  Lungs: CTAB, no distress  Abd: BS+, soft, nontender, no rebounding pain  Ext: no edema  Neuro: No focal deficits  Skin: Intact  IV: no phlebitis    Labs:              8.2    5.84  )-----------( 190      ( 27 Oct 2022 06:15 )             24.9     10-27    136  |  105  |  4<L>  ----------------------------<  111<H>  3.7   |  22  |  0.59    Ca    8.0<L>      27 Oct 2022 06:15  Phos  2.5     10-27  Mg     1.90     10-27    TPro  6.0  /  Alb  2.5<L>  /  TBili  0.6  /  DBili  x   /  AST  29  /  ALT  13  /  AlkPhos  121<H>  10-27    WBC Trend:  WBC Count: 5.84 (10-27-22 @ 06:15)  WBC Count: 5.78 (10-26-22 @ 05:48)  WBC Count: 4.87 (10-25-22 @ 07:20)  WBC Count: 4.24 (10-23-22 @ 07:20)    Creatine Trend:  Creatinine, Serum: 0.59 (10-27)  Creatinine, Serum: 0.53 (10-26)  Creatinine, Serum: 0.56 (10-25)  Creatinine, Serum: 0.54 (10-23)    Liver Biochemical Testing Trend:  Alanine Aminotransferase (ALT/SGPT): 13 (10-27)  Alanine Aminotransferase (ALT/SGPT): 17 (10-26)  Alanine Aminotransferase (ALT/SGPT): 24 (10-23)  Alanine Aminotransferase (ALT/SGPT): 20 (10-22)  Alanine Aminotransferase (ALT/SGPT): 25 (10-11)  Aspartate Aminotransferase (AST/SGOT): 29 (10-27-22 @ 06:15)  Aspartate Aminotransferase (AST/SGOT): 32 (10-26-22 @ 05:48)  Aspartate Aminotransferase (AST/SGOT): 34 (10-23-22 @ 07:20)  Aspartate Aminotransferase (AST/SGOT): 28 (10-22-22 @ 01:55)  Aspartate Aminotransferase (AST/SGOT): 35 (10-11-22 @ 16:03)  Bilirubin Total, Serum: 0.6 (10-27)  Bilirubin Total, Serum: 0.6 (10-26)  Bilirubin Total, Serum: 0.9 (10-23)  Bilirubin Total, Serum: 0.9 (10-22)  Bilirubin Total, Serum: 0.6 (10-11)    Trend LDH    MICROBIOLOGY:  Vancomycin Level, Trough: 8.9 (10-27 @ 06:15)    MRSA PCR Result.: NotDetec (10-26-22 @ 19:15)  MRSA PCR Result.: NotDetec (07-20-22 @ 09:12)    Culture - Sputum (collected 26 Oct 2022 18:19)  Source: .Sputum Sputum    Culture - Blood (collected 25 Oct 2022 16:25)  Source: .Blood Blood-Peripheral  Preliminary Report:    No growth to date.    Culture - Blood (collected 25 Oct 2022 15:20)  Source: .Blood Blood-Venous  Preliminary Report:    No growth to date.    Culture - Blood (collected 22 Oct 2022 13:49)  Source: .Blood Blood-Peripheral  Preliminary Report:    No growth to date.    Culture - Blood (collected 22 Oct 2022 13:40)  Source: .Blood Blood-Peripheral  Preliminary Report:    No growth to date.    Culture - Urine (collected 22 Oct 2022 02:28)  Source: Clean Catch Clean Catch (Midstream)  Final Report:    >100,000 CFU/ml Escherichia coli ESBL  Organism: Escherichia coli ESBL  Organism: Escherichia coli ESBL    Sensitivities:      -  Amikacin: S <=16      -  Amoxicillin/Clavulanic Acid: S <=8/4      -  Ampicillin: R >16 These ampicillin results predict results for amoxicillin      -  Ampicillin/Sulbactam: S 8/4 Enterobacter, Klebsiella aerogenes, Citrobacter, and Serratia may develop resistance during prolonged therapy (3-4 days)      -  Aztreonam: R >16      -  Cefazolin: R >16 For uncomplicated UTI with K. pneumoniae, E. coli, or P. mirablis: TALHA <=16 is sensitive and TALHA >=32 is resistant. This also predicts results for oral agents cefaclor, cefdinir, cefpodoxime, cefprozil, cefuroxime axetil, cephalexin and locarbef for uncomplicated UTI. Note that some isolates may be susceptible to these agents while testing resistant to cefazolin.      -  Cefepime: R >16      -  Ceftriaxone: R >32 Enterobacter, Klebsiella aerogenes, Citrobacter, and Serratia may develop resistance during prolonged therapy      -  Ciprofloxacin: S <=0.25      -  Ertapenem: S <=0.5      -  Gentamicin: S <=2      -  Imipenem: S <=1      -  Levofloxacin: S <=0.5      -  Meropenem: S <=1      -  Nitrofurantoin: S <=32 Should not be used to treat pyelonephritis      -  Piperacillin/Tazobactam: S <=8      -  Tigecycline: S <=2      -  Tobramycin: S <=2      -  Trimethoprim/Sulfamethoxazole: S <=0.5/9.5      Method Type: TALHA    Culture - Acid Fast - Bronchial w/Smear (collected 20 Jul 2022 16:20)  Source: .Bronchial LEFT UPPER LOBE  Final Report:    No acid fast bacilli isolated after 6 weeks.    Culture - Fungal, Bronchial (collected 20 Jul 2022 16:20)  Source: .Bronchial LEFT UPPER LOBE  Final Report:    No fungus isolated after 4 weeks.    Culture - Bronchial (collected 20 Jul 2022 16:20)  Source: .Bronchial LEFT UPPER LOBE  Final Report:    Normal Respiratory Danielle present    Culture - Blood (collected 06 Jul 2022 02:08)  Source: .Blood Blood-Peripheral  Final Report:    No Growth Final    Cytomegalovirus By PCR:   NotDetec (10-25-22 @ 07:20)    Rapid RVP Result: NotDetec (10-25 @ 17:38)  Rapid RVP Result: NotDetec (10-22 @ 02:19)    Procalcitonin, Serum: 9.19 (10-25)    RADIOLOGY:  imaging below personally reviewed    Xray Chest 1 View- PORTABLE-Urgent:  (25 Oct 2022 12:25)    CT Chest w/ IV Cont:   ACC: [id number] EXAM:  CT CHEST IC                          PROCEDURE DATE:  10/25/2022    INTERPRETATION:  CLINICAL INFORMATION: White out on chest x-ray,   follow-up evaluation    COMPARISON: Chest x-ray 10/20/2022, CT chest 7/18/2022    CONTRAST/COMPLICATIONS:  IV Contrast: Omnipaque 350  45 cc administered   5 cc discarded  Oral Contrast: NONE  Complications: None reported at time of study completion    PROCEDURE:  CT scan of the chest was obtained after the administration of intravenous   contrast.    FINDINGS:    MEDIASTINUM / LYMPH NODES: A few small mediastinal lymph nodes are noted,   unchanged.    HEART/VASCULATURE: The heart is normal in size. There is no pericardial   effusion. Right chest port with tip in the right atrium.    AIRWAYS/LUNGS/PLEURA: Redemonstrated left upper lobe/paramediastinal mass   now measuring 4.1 x 1.6 cm, previously 5.1 x 2.2 cm. The mass continues   to encase and narrow the left upper lobar branch of the pulmonary artery.   Underlying interlobular septal thickening is again visualized. There is   interval development of new groundglass opacities throughout the left   upper lobe, suggestive of infection. Bibasilar atelectasis. No pleural   effusion or pneumothorax.    UPPER ABDOMEN: Status post Whipple with pneumobilia. Small volume ascites   in the gastrohepatic space.    BONES/SOFT TISSUES: Degenerative changes in the spine. Unchanged small   sclerotic focus in the T6 vertebral body.    IMPRESSION:    Interval decrease in size of left upper lobe/paramediastinal mass and   adjacent metastatic disease.    Interval development of new ground glass opacities in the left upper   lobe, suggestive of infection in the setting of recent chemotherapy.    --- End of Report ---    LUCAS MURPHY MD; Resident Radiologist  This document has been electronically signed.  PAOLA CRAVEN MD; Attending Radiologist  This document has been electronically signed. Oct 26 2022 10:41AM (10-25-22 @ 17:48)       Follow Up:      Patient is a 57y old Female who presents with a chief complaint of fevers and chills (27 Oct 2022 09:52)    ID following for YUNI PNA.    Interval History/ROS: Patient still continues to complain of painful swallowing but it isn't as much but was able to eat something this morning. She complains of copious cough production. Denies any fever, abdominal pain, N/V/D.     REVIEW OF SYSTEMS  [  ] ROS unobtainable because:    [ x ] All other systems negative except as noted below    Constitutional:  [ ] fever [ ] chills  [ ] weight loss  [ ]night sweat  [x ]poor appetite/PO intake [x ]fatigue   Skin:  [ ] rash [ ] phlebitis	  Eyes: [ ] icterus [ ] pain  [ ] discharge	  ENMT: [ x] sore throat  [ ] thrush [ ] ulcers [ ] exudates [ ]anosmia  Respiratory: [ ] dyspnea [ ] hemoptysis [x ] cough [x ] sputum	  Cardiovascular:  [ ] chest pain [ ] palpitations [ ] edema	  Gastrointestinal:  [ ] nausea [ ] vomiting [ ] diarrhea [ ] constipation [x ] painful swallowing	  Genitourinary:  [ ] dysuria [ ] frequency [ ] hematuria [ ] discharge [ ] flank pain  [ ] incontinence  Musculoskeletal:  [ ] myalgias [ ] arthralgias [ ] arthritis  [ ] back pain  Neurological:  [ ] headache [ ] weakness [ ] seizures  [ ] confusion/altered mental status    Allergies  No Known Allergies    ANTIMICROBIALS:    fluconAZOLE IVPB 200 every 24 hours  piperacillin/tazobactam IVPB.. 3.375 every 8 hours  vancomycin  IVPB 1000 every 12 hours    OTHER MEDS: MEDICATIONS  (STANDING):  acetaminophen     Tablet .. 650 every 6 hours PRN  benzonatate 100 three times a day PRN  bisacodyl 5 daily PRN  dextrose 50% Injectable 25 once  dextrose 50% Injectable 12.5 once  dextrose 50% Injectable 25 once  dextrose Oral Gel 15 once  enoxaparin Injectable 40 every 24 hours  gabapentin 300 two times a day  glucagon  Injectable 1 once  guaiFENesin Oral Liquid (Sugar-Free) 100 every 6 hours PRN  influenza   Vaccine 0.5 once  insulin lispro (ADMELOG) corrective regimen sliding scale  three times a day before meals  insulin lispro (ADMELOG) corrective regimen sliding scale  at bedtime  montelukast 10 at bedtime  morphine  - Injectable 2 every 4 hours PRN  morphine  - Injectable 4 every 4 hours PRN  ondansetron   Disintegrating Tablet 4 every 8 hours  pantoprazole   Suspension 40 before breakfast  polyethylene glycol 3350 17 daily  senna 2 at bedtime  sucralfate 1 two times a day    Vital Signs Last 24 Hrs  T(F): 99.1 (10-27-22 @ 05:10), Max: 102.8 (10-25-22 @ 15:24)    Vital Signs Last 24 Hrs  HR: 88 (10-27-22 @ 05:10) (88 - 95)  BP: 129/66 (10-27-22 @ 05:10) (114/65 - 129/66)  RR: 18 (10-27-22 @ 05:10)  SpO2: 100% (10-27-22 @ 05:10) (96% - 100%)  Wt(kg): --    EXAM:    GA: NAD  HEENT: oral cavity no lesion  CV: nl S1/S2, no RMG  Lungs: CTAB, no distress  Abd: BS+, soft, nontender, no rebounding pain  Ext: no edema  Neuro: No focal deficits  Skin: Intact  IV: no phlebitis    Labs:              8.2    5.84  )-----------( 190      ( 27 Oct 2022 06:15 )             24.9     10-27    136  |  105  |  4<L>  ----------------------------<  111<H>  3.7   |  22  |  0.59    Ca    8.0<L>      27 Oct 2022 06:15  Phos  2.5     10-27  Mg     1.90     10-27    TPro  6.0  /  Alb  2.5<L>  /  TBili  0.6  /  DBili  x   /  AST  29  /  ALT  13  /  AlkPhos  121<H>  10-27    WBC Trend:  WBC Count: 5.84 (10-27-22 @ 06:15)  WBC Count: 5.78 (10-26-22 @ 05:48)  WBC Count: 4.87 (10-25-22 @ 07:20)  WBC Count: 4.24 (10-23-22 @ 07:20)    Creatine Trend:  Creatinine, Serum: 0.59 (10-27)  Creatinine, Serum: 0.53 (10-26)  Creatinine, Serum: 0.56 (10-25)  Creatinine, Serum: 0.54 (10-23)    Liver Biochemical Testing Trend:  Alanine Aminotransferase (ALT/SGPT): 13 (10-27)  Alanine Aminotransferase (ALT/SGPT): 17 (10-26)  Alanine Aminotransferase (ALT/SGPT): 24 (10-23)  Alanine Aminotransferase (ALT/SGPT): 20 (10-22)  Alanine Aminotransferase (ALT/SGPT): 25 (10-11)  Aspartate Aminotransferase (AST/SGOT): 29 (10-27-22 @ 06:15)  Aspartate Aminotransferase (AST/SGOT): 32 (10-26-22 @ 05:48)  Aspartate Aminotransferase (AST/SGOT): 34 (10-23-22 @ 07:20)  Aspartate Aminotransferase (AST/SGOT): 28 (10-22-22 @ 01:55)  Aspartate Aminotransferase (AST/SGOT): 35 (10-11-22 @ 16:03)  Bilirubin Total, Serum: 0.6 (10-27)  Bilirubin Total, Serum: 0.6 (10-26)  Bilirubin Total, Serum: 0.9 (10-23)  Bilirubin Total, Serum: 0.9 (10-22)  Bilirubin Total, Serum: 0.6 (10-11)    Trend LDH    MICROBIOLOGY:  Vancomycin Level, Trough: 8.9 (10-27 @ 06:15)    MRSA PCR Result.: NotDetec (10-26-22 @ 19:15)  MRSA PCR Result.: NotDetec (07-20-22 @ 09:12)    Culture - Sputum (collected 26 Oct 2022 18:19)  Source: .Sputum Sputum    Culture - Blood (collected 25 Oct 2022 16:25)  Source: .Blood Blood-Peripheral  Preliminary Report:    No growth to date.    Culture - Blood (collected 25 Oct 2022 15:20)  Source: .Blood Blood-Venous  Preliminary Report:    No growth to date.    Culture - Blood (collected 22 Oct 2022 13:49)  Source: .Blood Blood-Peripheral  Preliminary Report:    No growth to date.    Culture - Blood (collected 22 Oct 2022 13:40)  Source: .Blood Blood-Peripheral  Preliminary Report:    No growth to date.    Culture - Urine (collected 22 Oct 2022 02:28)  Source: Clean Catch Clean Catch (Midstream)  Final Report:    >100,000 CFU/ml Escherichia coli ESBL  Organism: Escherichia coli ESBL  Organism: Escherichia coli ESBL    Sensitivities:      -  Amikacin: S <=16      -  Amoxicillin/Clavulanic Acid: S <=8/4      -  Ampicillin: R >16 These ampicillin results predict results for amoxicillin      -  Ampicillin/Sulbactam: S 8/4 Enterobacter, Klebsiella aerogenes, Citrobacter, and Serratia may develop resistance during prolonged therapy (3-4 days)      -  Aztreonam: R >16      -  Cefazolin: R >16 For uncomplicated UTI with K. pneumoniae, E. coli, or P. mirablis: TALHA <=16 is sensitive and TALHA >=32 is resistant. This also predicts results for oral agents cefaclor, cefdinir, cefpodoxime, cefprozil, cefuroxime axetil, cephalexin and locarbef for uncomplicated UTI. Note that some isolates may be susceptible to these agents while testing resistant to cefazolin.      -  Cefepime: R >16      -  Ceftriaxone: R >32 Enterobacter, Klebsiella aerogenes, Citrobacter, and Serratia may develop resistance during prolonged therapy      -  Ciprofloxacin: S <=0.25      -  Ertapenem: S <=0.5      -  Gentamicin: S <=2      -  Imipenem: S <=1      -  Levofloxacin: S <=0.5      -  Meropenem: S <=1      -  Nitrofurantoin: S <=32 Should not be used to treat pyelonephritis      -  Piperacillin/Tazobactam: S <=8      -  Tigecycline: S <=2      -  Tobramycin: S <=2      -  Trimethoprim/Sulfamethoxazole: S <=0.5/9.5      Method Type: TALHA    Culture - Acid Fast - Bronchial w/Smear (collected 20 Jul 2022 16:20)  Source: .Bronchial LEFT UPPER LOBE  Final Report:    No acid fast bacilli isolated after 6 weeks.    Culture - Fungal, Bronchial (collected 20 Jul 2022 16:20)  Source: .Bronchial LEFT UPPER LOBE  Final Report:    No fungus isolated after 4 weeks.    Culture - Bronchial (collected 20 Jul 2022 16:20)  Source: .Bronchial LEFT UPPER LOBE  Final Report:    Normal Respiratory Danielle present    Culture - Blood (collected 06 Jul 2022 02:08)  Source: .Blood Blood-Peripheral  Final Report:    No Growth Final    Cytomegalovirus By PCR:   NotDetec (10-25-22 @ 07:20)    Rapid RVP Result: NotDetec (10-25 @ 17:38)  Rapid RVP Result: NotDetec (10-22 @ 02:19)    Procalcitonin, Serum: 9.19 (10-25)    RADIOLOGY:  imaging below personally reviewed    Xray Chest 1 View- PORTABLE-Urgent:  (25 Oct 2022 12:25)    CT Chest w/ IV Cont:   ACC: [id number] EXAM:  CT CHEST IC                          PROCEDURE DATE:  10/25/2022    INTERPRETATION:  CLINICAL INFORMATION: White out on chest x-ray,   follow-up evaluation    COMPARISON: Chest x-ray 10/20/2022, CT chest 7/18/2022    CONTRAST/COMPLICATIONS:  IV Contrast: Omnipaque 350  45 cc administered   5 cc discarded  Oral Contrast: NONE  Complications: None reported at time of study completion    PROCEDURE:  CT scan of the chest was obtained after the administration of intravenous   contrast.    FINDINGS:    MEDIASTINUM / LYMPH NODES: A few small mediastinal lymph nodes are noted,   unchanged.    HEART/VASCULATURE: The heart is normal in size. There is no pericardial   effusion. Right chest port with tip in the right atrium.    AIRWAYS/LUNGS/PLEURA: Redemonstrated left upper lobe/paramediastinal mass   now measuring 4.1 x 1.6 cm, previously 5.1 x 2.2 cm. The mass continues   to encase and narrow the left upper lobar branch of the pulmonary artery.   Underlying interlobular septal thickening is again visualized. There is   interval development of new groundglass opacities throughout the left   upper lobe, suggestive of infection. Bibasilar atelectasis. No pleural   effusion or pneumothorax.    UPPER ABDOMEN: Status post Whipple with pneumobilia. Small volume ascites   in the gastrohepatic space.    BONES/SOFT TISSUES: Degenerative changes in the spine. Unchanged small   sclerotic focus in the T6 vertebral body.    IMPRESSION:    Interval decrease in size of left upper lobe/paramediastinal mass and   adjacent metastatic disease.    Interval development of new ground glass opacities in the left upper   lobe, suggestive of infection in the setting of recent chemotherapy.    --- End of Report ---    LUCAS MURPHY MD; Resident Radiologist  This document has been electronically signed.  PAOLA CRAVEN MD; Attending Radiologist  This document has been electronically signed. Oct 26 2022 10:41AM (10-25-22 @ 17:48)

## 2022-10-27 NOTE — PROGRESS NOTE ADULT - ATTENDING COMMENTS
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 pHTN (dx on imaging 7/21/22), ventral hernia repair cb left inferior epigastric pseudoaneurysm, T2DM a1c 7.4%, HTN presenting with fever and chills found to have ESBL E coli UTI with GI consulted for odynophagia/dysphagia, Spoke with daughter at bedside who assisted with translation services    Left pulmonary radiation  Ongoing dysphagia, small white patch seen at the Left lateral aspect Of the tongue  Differential diagnosis includes infectious process such as candidiasis, less likely to be HSV/CMV esophagitis, rule out radiation, rule out erosive esophagitis secondary to GERD  Slight improvement with initiation of fluconazole  Positive urine culture    Recommendation  1.  PPI twice a day  2.  Soft foods as tolerated  3.  Given febrile o/n, with new imaging concerning for superimposed infection with lung mass s/p radiation, would avoid anesthesia and EGD until this improves, in the meanwhile, start empiric treatment for Candidiasis of esophagus, IV fluconazole 200mg IV Qdaily, MUST HOLD ATORVASTATIN.

## 2022-10-27 NOTE — PROGRESS NOTE ADULT - SUBJECTIVE AND OBJECTIVE BOX
Chief Complaint:  Patient is a 57y old  Female who presents with a chief complaint of fevers and chills (26 Oct 2022 15:52)      Interval Events: last fever 10/25  - daughter at bedside, reports swallowing has improved      Hospital Medications:  acetaminophen     Tablet .. 650 milliGRAM(s) Oral every 6 hours PRN  atorvastatin 10 milliGRAM(s) Oral at bedtime  benzonatate 100 milliGRAM(s) Oral three times a day PRN  bisacodyl 5 milliGRAM(s) Oral daily PRN  cholecalciferol 5000 Unit(s) Oral daily  dextrose 50% Injectable 25 Gram(s) IV Push once  dextrose 50% Injectable 12.5 Gram(s) IV Push once  dextrose 50% Injectable 25 Gram(s) IV Push once  dextrose Oral Gel 15 Gram(s) Oral once  enoxaparin Injectable 40 milliGRAM(s) SubCutaneous every 24 hours  fluconAZOLE IVPB 200 milliGRAM(s) IV Intermittent every 24 hours  gabapentin 300 milliGRAM(s) Oral two times a day  glucagon  Injectable 1 milliGRAM(s) IntraMuscular once  guaiFENesin Oral Liquid (Sugar-Free) 100 milliGRAM(s) Oral every 6 hours PRN  influenza   Vaccine 0.5 milliLiter(s) IntraMuscular once  insulin lispro (ADMELOG) corrective regimen sliding scale   SubCutaneous three times a day before meals  insulin lispro (ADMELOG) corrective regimen sliding scale   SubCutaneous at bedtime  montelukast 10 milliGRAM(s) Oral at bedtime  morphine  - Injectable 2 milliGRAM(s) IV Push every 4 hours PRN  morphine  - Injectable 4 milliGRAM(s) IV Push every 4 hours PRN  multivitamin 1 Tablet(s) Oral daily  naloxone Injectable 0.4 milliGRAM(s) IV Push once  ondansetron   Disintegrating Tablet 4 milliGRAM(s) Oral every 8 hours  pantoprazole   Suspension 40 milliGRAM(s) Oral before breakfast  piperacillin/tazobactam IVPB.. 3.375 Gram(s) IV Intermittent every 8 hours  polyethylene glycol 3350 17 Gram(s) Oral daily  senna 2 Tablet(s) Oral at bedtime  sodium chloride 0.9%. 1000 milliLiter(s) IV Continuous <Continuous>  sucralfate 1 Gram(s) Oral two times a day  vancomycin  IVPB 750 milliGRAM(s) IV Intermittent every 12 hours      PMHX/PSHX:  No pertinent past medical history    Essential hypertension    Gastroesophageal reflux disease without esophagitis    Mild intermittent asthma without complication    Arthritis    Neuropathy    Type 2 diabetes mellitus without complication, without long-term current use of insulin    Adenocarcinoma of gallbladder    Cancer of ampulla of Vater    No significant past surgical history    H/O Whipple procedure    Hemorrhage of gastroduodenal artery    H/O drainage of abscess    H/O eye surgery    Admission for chemotherapy            ROS:     General:  No weight loss, fevers, chills, night sweats, fatigue   Eyes:  No vision changes  ENT:  No sore throat, pain, runny nose  CV:  No chest pain, palpitations, dizziness   Resp:  No SOB, cough, wheezing  GI:  See HPI  :  No burning with urination, hematuria  Muscle:  No pain, weakness  Neuro:  No weakness/tingling, memory problems  Psych:  No fatigue, insomnia, mood problems, depression  Heme:  No easy bruisability  Skin:  No rash, edema      PHYSICAL EXAM:     GENERAL:  Well developed, no distress  HEENT:  NC/AT,  conjunctivae clear, sclera anicteric  CHEST:  Full & symmetric excursion, no increased effort w/ respirations  HEART:  Regular rhythm & rate  ABDOMEN:  Soft, non-tender, non-distended  EXTREMITIES:  no LE  edema  SKIN:  No rash/erythema/ecchymoses/petechiae/wounds/jaundice  NEURO:  Alert, oriented    Vital Signs:  Vital Signs Last 24 Hrs  T(C): 37.3 (27 Oct 2022 05:10), Max: 37.4 (26 Oct 2022 21:41)  T(F): 99.1 (27 Oct 2022 05:10), Max: 99.4 (26 Oct 2022 21:41)  HR: 88 (27 Oct 2022 05:10) (88 - 95)  BP: 129/66 (27 Oct 2022 05:10) (114/65 - 129/66)  BP(mean): --  RR: 18 (27 Oct 2022 05:10) (18 - 18)  SpO2: 100% (27 Oct 2022 05:10) (96% - 100%)    Parameters below as of 27 Oct 2022 05:10  Patient On (Oxygen Delivery Method): room air      Daily     Daily     LABS:                        8.2    5.84  )-----------( 190      ( 27 Oct 2022 06:15 )             24.9     10-27    136  |  105  |  4<L>  ----------------------------<  111<H>  3.7   |  22  |  0.59    Ca    8.0<L>      27 Oct 2022 06:15  Phos  2.5     10-27  Mg     1.90     10-27    TPro  6.0  /  Alb  2.5<L>  /  TBili  0.6  /  DBili  x   /  AST  29  /  ALT  13  /  AlkPhos  121<H>  10-27    LIVER FUNCTIONS - ( 27 Oct 2022 06:15 )  Alb: 2.5 g/dL / Pro: 6.0 g/dL / ALK PHOS: 121 U/L / ALT: 13 U/L / AST: 29 U/L / GGT: x                   Imaging:

## 2022-10-28 LAB
ANION GAP SERPL CALC-SCNC: 8 MMOL/L — SIGNIFICANT CHANGE UP (ref 7–14)
BUN SERPL-MCNC: 4 MG/DL — LOW (ref 7–23)
CALCIUM SERPL-MCNC: 8.1 MG/DL — LOW (ref 8.4–10.5)
CHLORIDE SERPL-SCNC: 105 MMOL/L — SIGNIFICANT CHANGE UP (ref 98–107)
CO2 SERPL-SCNC: 24 MMOL/L — SIGNIFICANT CHANGE UP (ref 22–31)
CREAT SERPL-MCNC: 0.59 MG/DL — SIGNIFICANT CHANGE UP (ref 0.5–1.3)
CULTURE RESULTS: SIGNIFICANT CHANGE UP
EGFR: 106 ML/MIN/1.73M2 — SIGNIFICANT CHANGE UP
GLUCOSE BLDC GLUCOMTR-MCNC: 122 MG/DL — HIGH (ref 70–99)
GLUCOSE BLDC GLUCOMTR-MCNC: 142 MG/DL — HIGH (ref 70–99)
GLUCOSE BLDC GLUCOMTR-MCNC: 156 MG/DL — HIGH (ref 70–99)
GLUCOSE BLDC GLUCOMTR-MCNC: 226 MG/DL — HIGH (ref 70–99)
GLUCOSE SERPL-MCNC: 140 MG/DL — HIGH (ref 70–99)
HCT VFR BLD CALC: 25.6 % — LOW (ref 34.5–45)
HGB BLD-MCNC: 8.1 G/DL — LOW (ref 11.5–15.5)
MAGNESIUM SERPL-MCNC: 1.8 MG/DL — SIGNIFICANT CHANGE UP (ref 1.6–2.6)
MCHC RBC-ENTMCNC: 24 PG — LOW (ref 27–34)
MCHC RBC-ENTMCNC: 31.6 GM/DL — LOW (ref 32–36)
MCV RBC AUTO: 76 FL — LOW (ref 80–100)
NRBC # BLD: 0 /100 WBCS — SIGNIFICANT CHANGE UP (ref 0–0)
NRBC # FLD: 0 K/UL — SIGNIFICANT CHANGE UP (ref 0–0)
PHOSPHATE SERPL-MCNC: 2.8 MG/DL — SIGNIFICANT CHANGE UP (ref 2.5–4.5)
PLATELET # BLD AUTO: 201 K/UL — SIGNIFICANT CHANGE UP (ref 150–400)
POTASSIUM SERPL-MCNC: 3.5 MMOL/L — SIGNIFICANT CHANGE UP (ref 3.5–5.3)
POTASSIUM SERPL-SCNC: 3.5 MMOL/L — SIGNIFICANT CHANGE UP (ref 3.5–5.3)
RBC # BLD: 3.37 M/UL — LOW (ref 3.8–5.2)
RBC # FLD: 18.1 % — HIGH (ref 10.3–14.5)
SODIUM SERPL-SCNC: 137 MMOL/L — SIGNIFICANT CHANGE UP (ref 135–145)
SPECIMEN SOURCE: SIGNIFICANT CHANGE UP
WBC # BLD: 6.45 K/UL — SIGNIFICANT CHANGE UP (ref 3.8–10.5)
WBC # FLD AUTO: 6.45 K/UL — SIGNIFICANT CHANGE UP (ref 3.8–10.5)

## 2022-10-28 PROCEDURE — 99232 SBSQ HOSP IP/OBS MODERATE 35: CPT | Mod: GC

## 2022-10-28 PROCEDURE — 99232 SBSQ HOSP IP/OBS MODERATE 35: CPT

## 2022-10-28 RX ADMIN — SODIUM CHLORIDE 100 MILLILITER(S): 9 INJECTION INTRAMUSCULAR; INTRAVENOUS; SUBCUTANEOUS at 12:16

## 2022-10-28 RX ADMIN — GABAPENTIN 300 MILLIGRAM(S): 400 CAPSULE ORAL at 18:49

## 2022-10-28 RX ADMIN — ONDANSETRON 4 MILLIGRAM(S): 8 TABLET, FILM COATED ORAL at 13:04

## 2022-10-28 RX ADMIN — GABAPENTIN 300 MILLIGRAM(S): 400 CAPSULE ORAL at 05:57

## 2022-10-28 RX ADMIN — FLUCONAZOLE 100 MILLIGRAM(S): 150 TABLET ORAL at 02:28

## 2022-10-28 RX ADMIN — PIPERACILLIN AND TAZOBACTAM 25 GRAM(S): 4; .5 INJECTION, POWDER, LYOPHILIZED, FOR SOLUTION INTRAVENOUS at 21:14

## 2022-10-28 RX ADMIN — Medication 1 TABLET(S): at 12:16

## 2022-10-28 RX ADMIN — POLYETHYLENE GLYCOL 3350 17 GRAM(S): 17 POWDER, FOR SOLUTION ORAL at 12:15

## 2022-10-28 RX ADMIN — ENOXAPARIN SODIUM 40 MILLIGRAM(S): 100 INJECTION SUBCUTANEOUS at 12:16

## 2022-10-28 RX ADMIN — Medication 1 GRAM(S): at 18:49

## 2022-10-28 RX ADMIN — Medication 100 MILLIGRAM(S): at 05:57

## 2022-10-28 RX ADMIN — PIPERACILLIN AND TAZOBACTAM 25 GRAM(S): 4; .5 INJECTION, POWDER, LYOPHILIZED, FOR SOLUTION INTRAVENOUS at 13:04

## 2022-10-28 RX ADMIN — ONDANSETRON 4 MILLIGRAM(S): 8 TABLET, FILM COATED ORAL at 21:14

## 2022-10-28 RX ADMIN — MONTELUKAST 10 MILLIGRAM(S): 4 TABLET, CHEWABLE ORAL at 21:14

## 2022-10-28 RX ADMIN — ONDANSETRON 4 MILLIGRAM(S): 8 TABLET, FILM COATED ORAL at 05:57

## 2022-10-28 RX ADMIN — Medication 5000 UNIT(S): at 12:16

## 2022-10-28 RX ADMIN — SODIUM CHLORIDE 100 MILLILITER(S): 9 INJECTION INTRAMUSCULAR; INTRAVENOUS; SUBCUTANEOUS at 21:15

## 2022-10-28 RX ADMIN — PANTOPRAZOLE SODIUM 40 MILLIGRAM(S): 20 TABLET, DELAYED RELEASE ORAL at 05:57

## 2022-10-28 RX ADMIN — Medication 1 GRAM(S): at 05:56

## 2022-10-28 RX ADMIN — Medication 1: at 08:31

## 2022-10-28 RX ADMIN — Medication 100 MILLIGRAM(S): at 14:34

## 2022-10-28 RX ADMIN — PIPERACILLIN AND TAZOBACTAM 25 GRAM(S): 4; .5 INJECTION, POWDER, LYOPHILIZED, FOR SOLUTION INTRAVENOUS at 05:57

## 2022-10-28 NOTE — PROGRESS NOTE ADULT - SUBJECTIVE AND OBJECTIVE BOX
CC: Patient is a 57y old  Female who presents with a chief complaint of fevers and chills (28 Oct 2022 12:11)    ID following for fever    Interval History/ROS: Patient now afebrile. Remains lethargic. Cough improving. On room air.    Rest of ROS negative.    Allergies  No Known Allergies    ANTIMICROBIALS:  fluconAZOLE IVPB 200 every 24 hours  piperacillin/tazobactam IVPB.. 3.375 every 8 hours    OTHER MEDS:  acetaminophen     Tablet .. 650 milliGRAM(s) Oral every 6 hours PRN  benzonatate 100 milliGRAM(s) Oral three times a day PRN  bisacodyl 5 milliGRAM(s) Oral daily PRN  cholecalciferol 5000 Unit(s) Oral daily  dextrose 50% Injectable 25 Gram(s) IV Push once  dextrose 50% Injectable 12.5 Gram(s) IV Push once  dextrose 50% Injectable 25 Gram(s) IV Push once  dextrose Oral Gel 15 Gram(s) Oral once  enoxaparin Injectable 40 milliGRAM(s) SubCutaneous every 24 hours  FIRST- Mouthwash  BLM 15 milliLiter(s) Swish and Spit every 8 hours PRN  gabapentin 300 milliGRAM(s) Oral two times a day  glucagon  Injectable 1 milliGRAM(s) IntraMuscular once  guaiFENesin Oral Liquid (Sugar-Free) 100 milliGRAM(s) Oral every 6 hours PRN  influenza   Vaccine 0.5 milliLiter(s) IntraMuscular once  insulin lispro (ADMELOG) corrective regimen sliding scale   SubCutaneous three times a day before meals  insulin lispro (ADMELOG) corrective regimen sliding scale   SubCutaneous at bedtime  montelukast 10 milliGRAM(s) Oral at bedtime  morphine  - Injectable 2 milliGRAM(s) IV Push every 4 hours PRN  morphine  - Injectable 4 milliGRAM(s) IV Push every 4 hours PRN  multivitamin 1 Tablet(s) Oral daily  naloxone Injectable 0.4 milliGRAM(s) IV Push once  ondansetron   Disintegrating Tablet 4 milliGRAM(s) Oral every 8 hours  pantoprazole   Suspension 40 milliGRAM(s) Oral before breakfast  polyethylene glycol 3350 17 Gram(s) Oral daily  senna 2 Tablet(s) Oral at bedtime  sodium chloride 0.9%. 1000 milliLiter(s) IV Continuous <Continuous>  sucralfate 1 Gram(s) Oral two times a day    PE:    Vital Signs Last 24 Hrs  T(C): 36.8 (28 Oct 2022 12:21), Max: 37.2 (27 Oct 2022 20:18)  T(F): 98.3 (28 Oct 2022 12:21), Max: 99 (27 Oct 2022 20:18)  HR: 79 (28 Oct 2022 12:21) (79 - 90)  BP: 106/63 (28 Oct 2022 12:21) (103/67 - 120/55)  BP(mean): --  RR: 18 (28 Oct 2022 12:21) (18 - 18)  SpO2: 95% (28 Oct 2022 12:21) (95% - 99%)    Parameters below as of 28 Oct 2022 12:21  Patient On (Oxygen Delivery Method): room air    Gen: AOx3, NAD  CV: S1+S2 normal, no murmurs  Resp: Clear bilat, no resp distress  Abd: Soft, nontender, +BS  Ext: No LE edema, no wounds  : No Swanson  IV/Skin: No thrombophlebitis, mediport intact  Neuro: no focal deficits    LABS:                          8.1    6.45  )-----------( 201      ( 28 Oct 2022 06:34 )             25.6       10-28    137  |  105  |  4<L>  ----------------------------<  140<H>  3.5   |  24  |  0.59    Ca    8.1<L>      28 Oct 2022 06:34  Phos  2.8     10-28  Mg     1.80     10-28    TPro  6.0  /  Alb  2.5<L>  /  TBili  0.6  /  DBili  x   /  AST  29  /  ALT  13  /  AlkPhos  121<H>  10-27          MICROBIOLOGY:  v  .Sputum Sputum  10-26-22   Normal Respiratory Danielle present  --    Rare polymorphonuclear leukocytes per low power field  Rare Squamous epithelial cells per low power field  Rare Yeast like cells seen per oil power field  Rare Gram Negative Rods seen per oil power field  Few Gram positive cocci in pairs seen per oil power field      .Blood Blood-Peripheral  10-25-22   No growth to date.  --  --      .Blood Blood-Venous  10-25-22   No growth to date.  --  --      .Blood Blood-Peripheral  10-22-22   No Growth Final  --  --      .Blood Blood-Peripheral  10-22-22   No Growth Final  --  --      Clean Catch Clean Catch (Midstream)  10-22-22   >100,000 CFU/ml Escherichia coli ESBL  --  Escherichia coli ESBL    Rapid RVP Result: NotDetec (10-25 @ 17:38)  CMVPCR Log: NotDetec Gns27TC/mL (10-25 @ 07:20)  Rapid RVP Result: NotDetec (10-22 @ 02:19)    RADIOLOGY:    < from: CT Chest w/ IV Cont (10.25.22 @ 17:48) >  IMPRESSION:    Interval decrease in size of left upper lobe/paramediastinal mass and   adjacent metastatic disease.    Interval development of new ground glass opacities in the left upper   lobe, suggestive of infection in the setting of recent chemotherapy.    < end of copied text >

## 2022-10-28 NOTE — PROGRESS NOTE ADULT - SUBJECTIVE AND OBJECTIVE BOX
Chief Complaint:  Patient is a 57y old  Female who presents with a chief complaint of fevers and chills (27 Oct 2022 15:54)      Interval Events: no acute events  - dysphagia continues to improve      Hospital Medications:  acetaminophen     Tablet .. 650 milliGRAM(s) Oral every 6 hours PRN  benzonatate 100 milliGRAM(s) Oral three times a day PRN  bisacodyl 5 milliGRAM(s) Oral daily PRN  cholecalciferol 5000 Unit(s) Oral daily  dextrose 50% Injectable 25 Gram(s) IV Push once  dextrose 50% Injectable 12.5 Gram(s) IV Push once  dextrose 50% Injectable 25 Gram(s) IV Push once  dextrose Oral Gel 15 Gram(s) Oral once  enoxaparin Injectable 40 milliGRAM(s) SubCutaneous every 24 hours  FIRST- Mouthwash  BLM 15 milliLiter(s) Swish and Spit every 8 hours PRN  fluconAZOLE IVPB 200 milliGRAM(s) IV Intermittent every 24 hours  gabapentin 300 milliGRAM(s) Oral two times a day  glucagon  Injectable 1 milliGRAM(s) IntraMuscular once  guaiFENesin Oral Liquid (Sugar-Free) 100 milliGRAM(s) Oral every 6 hours PRN  influenza   Vaccine 0.5 milliLiter(s) IntraMuscular once  insulin lispro (ADMELOG) corrective regimen sliding scale   SubCutaneous three times a day before meals  insulin lispro (ADMELOG) corrective regimen sliding scale   SubCutaneous at bedtime  montelukast 10 milliGRAM(s) Oral at bedtime  morphine  - Injectable 2 milliGRAM(s) IV Push every 4 hours PRN  morphine  - Injectable 4 milliGRAM(s) IV Push every 4 hours PRN  multivitamin 1 Tablet(s) Oral daily  naloxone Injectable 0.4 milliGRAM(s) IV Push once  ondansetron   Disintegrating Tablet 4 milliGRAM(s) Oral every 8 hours  pantoprazole   Suspension 40 milliGRAM(s) Oral before breakfast  piperacillin/tazobactam IVPB.. 3.375 Gram(s) IV Intermittent every 8 hours  polyethylene glycol 3350 17 Gram(s) Oral daily  senna 2 Tablet(s) Oral at bedtime  sodium chloride 0.9%. 1000 milliLiter(s) IV Continuous <Continuous>  sucralfate 1 Gram(s) Oral two times a day      PMHX/PSHX:  No pertinent past medical history    Essential hypertension    Gastroesophageal reflux disease without esophagitis    Mild intermittent asthma without complication    Arthritis    Neuropathy    Type 2 diabetes mellitus without complication, without long-term current use of insulin    Adenocarcinoma of gallbladder    Cancer of ampulla of Vater    No significant past surgical history    H/O Whipple procedure    Hemorrhage of gastroduodenal artery    H/O drainage of abscess    H/O eye surgery    Admission for chemotherapy            ROS:     General:  No weight loss, fevers, chills, night sweats, fatigue   Eyes:  No vision changes  ENT:  No sore throat, pain, runny nose  CV:  No chest pain, palpitations, dizziness   Resp:  No SOB, cough, wheezing  GI:  See HPI  :  No burning with urination, hematuria  Muscle:  No pain, weakness  Neuro:  No weakness/tingling, memory problems  Psych:  No fatigue, insomnia, mood problems, depression  Heme:  No easy bruisability  Skin:  No rash, edema      PHYSICAL EXAM:     GENERAL: chronically ill appearing  HEENT:  NC/AT,  conjunctivae clear, sclera anicteric  CHEST:  Full & symmetric excursion, no increased effort w/ respirations  HEART:  Regular rhythm & rate  ABDOMEN:  Soft, non-tender, non-distended  EXTREMITIES:  no LE  edema  SKIN:  No rash/erythema/ecchymoses/petechiae/wounds/jaundice  NEURO:  Alert, oriented    Vital Signs:  Vital Signs Last 24 Hrs  T(C): 37.2 (28 Oct 2022 05:54), Max: 37.2 (27 Oct 2022 20:18)  T(F): 98.9 (28 Oct 2022 05:54), Max: 99 (27 Oct 2022 20:18)  HR: 87 (28 Oct 2022 05:54) (87 - 90)  BP: 120/55 (28 Oct 2022 05:54) (103/67 - 120/55)  BP(mean): --  RR: 18 (28 Oct 2022 05:54) (17 - 18)  SpO2: 99% (28 Oct 2022 05:54) (98% - 100%)    Parameters below as of 28 Oct 2022 05:54  Patient On (Oxygen Delivery Method): room air      Daily     Daily     LABS:                        8.1    6.45  )-----------( 201      ( 28 Oct 2022 06:34 )             25.6     10-28    137  |  105  |  4<L>  ----------------------------<  140<H>  3.5   |  24  |  0.59    Ca    8.1<L>      28 Oct 2022 06:34  Phos  2.8     10-28  Mg     1.80     10-28    TPro  6.0  /  Alb  2.5<L>  /  TBili  0.6  /  DBili  x   /  AST  29  /  ALT  13  /  AlkPhos  121<H>  10-27    LIVER FUNCTIONS - ( 27 Oct 2022 06:15 )  Alb: 2.5 g/dL / Pro: 6.0 g/dL / ALK PHOS: 121 U/L / ALT: 13 U/L / AST: 29 U/L / GGT: x                   Imaging:

## 2022-10-28 NOTE — PROGRESS NOTE ADULT - ATTENDING COMMENTS
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 pHTN (dx on imaging 7/21/22), ventral hernia repair cb left inferior epigastric pseudoaneurysm, T2DM a1c 7.4%, HTN presenting with fever and chills found to have ESBL E coli UTI with GI consulted for odynophagia/dysphagia, Spoke with daughter at bedside who assisted with translation services    Left pulmonary radiation  Ongoing dysphagia, small white patch seen at the Left lateral aspect Of the tongue  Differential diagnosis includes infectious process such as candidiasis, less likely to be HSV/CMV esophagitis, rule out radiation, rule out erosive esophagitis secondary to GERD  Dysphagia improvement with initiation of fluconazole  Positive urine culture    Recommendation  1.  PPI twice a day  2.  Soft foods as tolerated  3.  Given febrile o/n, with new imaging concerning for superimposed infection with lung mass s/p radiation, would avoid anesthesia and EGD until this improves, in the meanwhile, start empiric treatment for Candidiasis of esophagus, IV fluconazole 200mg IV Qdaily, may convert to PO with total treatment 14-21 days, MUST HOLD ATORVASTATIN

## 2022-10-28 NOTE — PROGRESS NOTE ADULT - PROBLEM SELECTOR PLAN 2
Odynophagia with dysphagia to solids and liquids 2/2 pain. 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 Tylenol does not help to relieve the pain. Morphine 4mg IV made her dizzy. Pain is improving.   - C/w morphine 2mg IV q4 PRN   - S&S consult - not aspirating  - Soft diet for now in the setting of pain. Counseled family on low acidity foods for now.  - C/w Fluconazole   - Hold statin while on fluconazole (on atorvastatin 10mgqd at home).   - ID consulted, f/u recommendations.

## 2022-10-28 NOTE — PROGRESS NOTE ADULT - SUBJECTIVE AND OBJECTIVE BOX
Hospitalist Progress Note  Aimee Barraza MD Pager # [phone]    OVERNIGHT EVENTS: JAILYN    SUBJECTIVE / INTERVAL HPI: Patient seen and examined at bedside. Pt. reports feeling better. Improved cough. Improved pain with swallowing. Able to tolerate more food. She was able to walk in the hallway. All other ROS negative.     VITAL SIGNS:  Vital Signs Last 24 Hrs  T(C): 36.8 (28 Oct 2022 12:21), Max: 37.2 (27 Oct 2022 20:18)  T(F): 98.3 (28 Oct 2022 12:21), Max: 99 (27 Oct 2022 20:18)  HR: 79 (28 Oct 2022 12:21) (79 - 90)  BP: 106/63 (28 Oct 2022 12:21) (103/67 - 120/55)  BP(mean): --  RR: 18 (28 Oct 2022 12:21) (18 - 18)  SpO2: 95% (28 Oct 2022 12:21) (95% - 99%)    Parameters below as of 28 Oct 2022 12:21  Patient On (Oxygen Delivery Method): room air        PHYSICAL EXAM:    General: Appears older than stated age.   HEENT: NC/AT; PERRL, anicteric sclera; MMM  Neck: supple  Cardiovascular: +S1/S2; RRR  Respiratory: CTA B/L; no W/R/R  Gastrointestinal: soft, NT/ND; +BSx4  Extremities: WWP; no edema, clubbing or cyanosis  Vascular: 2+ radial, DP/PT pulses B/L  Neurological: AAOx3; no focal deficits    MEDICATIONS:  MEDICATIONS  (STANDING):  cholecalciferol 5000 Unit(s) Oral daily  dextrose 50% Injectable 25 Gram(s) IV Push once  dextrose 50% Injectable 12.5 Gram(s) IV Push once  dextrose 50% Injectable 25 Gram(s) IV Push once  dextrose Oral Gel 15 Gram(s) Oral once  enoxaparin Injectable 40 milliGRAM(s) SubCutaneous every 24 hours  fluconAZOLE IVPB 200 milliGRAM(s) IV Intermittent every 24 hours  gabapentin 300 milliGRAM(s) Oral two times a day  glucagon  Injectable 1 milliGRAM(s) IntraMuscular once  influenza   Vaccine 0.5 milliLiter(s) IntraMuscular once  insulin lispro (ADMELOG) corrective regimen sliding scale   SubCutaneous three times a day before meals  insulin lispro (ADMELOG) corrective regimen sliding scale   SubCutaneous at bedtime  montelukast 10 milliGRAM(s) Oral at bedtime  multivitamin 1 Tablet(s) Oral daily  naloxone Injectable 0.4 milliGRAM(s) IV Push once  ondansetron   Disintegrating Tablet 4 milliGRAM(s) Oral every 8 hours  pantoprazole   Suspension 40 milliGRAM(s) Oral before breakfast  piperacillin/tazobactam IVPB.. 3.375 Gram(s) IV Intermittent every 8 hours  polyethylene glycol 3350 17 Gram(s) Oral daily  senna 2 Tablet(s) Oral at bedtime  sodium chloride 0.9%. 1000 milliLiter(s) (100 mL/Hr) IV Continuous <Continuous>  sucralfate 1 Gram(s) Oral two times a day    MEDICATIONS  (PRN):  acetaminophen     Tablet .. 650 milliGRAM(s) Oral every 6 hours PRN Temp greater or equal to 38C (100.4F), Mild Pain (1 - 3)  benzonatate 100 milliGRAM(s) Oral three times a day PRN Cough  bisacodyl 5 milliGRAM(s) Oral daily PRN Constipation  FIRST- Mouthwash  BLM 15 milliLiter(s) Swish and Spit every 8 hours PRN Mouth Care, pain  guaiFENesin Oral Liquid (Sugar-Free) 100 milliGRAM(s) Oral every 6 hours PRN Cough  morphine  - Injectable 2 milliGRAM(s) IV Push every 4 hours PRN Moderate Pain (4 - 6)  morphine  - Injectable 4 milliGRAM(s) IV Push every 4 hours PRN Severe Pain (7 - 10)      ALLERGIES:  Allergies    No Known Allergies    Intolerances        LABS:                        8.1    6.45  )-----------( 201      ( 28 Oct 2022 06:34 )             25.6     10-28    137  |  105  |  4<L>  ----------------------------<  140<H>  3.5   |  24  |  0.59    Ca    8.1<L>      28 Oct 2022 06:34  Phos  2.8     10-28  Mg     1.80     10-28    TPro  6.0  /  Alb  2.5<L>  /  TBili  0.6  /  DBili  x   /  AST  29  /  ALT  13  /  AlkPhos  121<H>  10-27        CAPILLARY BLOOD GLUCOSE      POCT Blood Glucose.: 122 mg/dL (28 Oct 2022 11:59)      RADIOLOGY & ADDITIONAL TESTS: Reviewed.    ASSESSMENT:    PLAN:

## 2022-10-28 NOTE — PROGRESS NOTE ADULT - PROBLEM SELECTOR PLAN 1
Sepsis 2/2 UTI. UA positive. Febrile to 102.8 yesterday. No leukocytosis. Pt. is immunocompromised in the setting of malignancy, chemo and radiation. CXR showing a YUNI consolidation. CT chest performed showing a post-obstructive PNA. Broadened antibiotics. Remains afebrile.   - UCx with E.coli resistant to cephalosporins.  - F/u BCx from 10/25 - NGTD   - MRSA swab negative - d/c vanc  - C/w Zosyn    #Post obstructive PNA   - CT chest with YUNI PNA. Respiratory status stable. Not requiring O2. ID consulted. Initially broadened to vanc/zosyn. D/c'ed vanc. C/w Zosyn. Improving.

## 2022-10-29 LAB
ANION GAP SERPL CALC-SCNC: 9 MMOL/L — SIGNIFICANT CHANGE UP (ref 7–14)
BUN SERPL-MCNC: 3 MG/DL — LOW (ref 7–23)
CALCIUM SERPL-MCNC: 8.1 MG/DL — LOW (ref 8.4–10.5)
CHLORIDE SERPL-SCNC: 105 MMOL/L — SIGNIFICANT CHANGE UP (ref 98–107)
CO2 SERPL-SCNC: 22 MMOL/L — SIGNIFICANT CHANGE UP (ref 22–31)
CREAT SERPL-MCNC: 0.54 MG/DL — SIGNIFICANT CHANGE UP (ref 0.5–1.3)
EGFR: 108 ML/MIN/1.73M2 — SIGNIFICANT CHANGE UP
GLUCOSE BLDC GLUCOMTR-MCNC: 135 MG/DL — HIGH (ref 70–99)
GLUCOSE BLDC GLUCOMTR-MCNC: 139 MG/DL — HIGH (ref 70–99)
GLUCOSE BLDC GLUCOMTR-MCNC: 164 MG/DL — HIGH (ref 70–99)
GLUCOSE BLDC GLUCOMTR-MCNC: 93 MG/DL — SIGNIFICANT CHANGE UP (ref 70–99)
GLUCOSE SERPL-MCNC: 125 MG/DL — HIGH (ref 70–99)
HCT VFR BLD CALC: 25.8 % — LOW (ref 34.5–45)
HGB BLD-MCNC: 8.2 G/DL — LOW (ref 11.5–15.5)
MAGNESIUM SERPL-MCNC: 1.7 MG/DL — SIGNIFICANT CHANGE UP (ref 1.6–2.6)
MCHC RBC-ENTMCNC: 24.3 PG — LOW (ref 27–34)
MCHC RBC-ENTMCNC: 31.8 GM/DL — LOW (ref 32–36)
MCV RBC AUTO: 76.3 FL — LOW (ref 80–100)
NRBC # BLD: 0 /100 WBCS — SIGNIFICANT CHANGE UP (ref 0–0)
NRBC # FLD: 0 K/UL — SIGNIFICANT CHANGE UP (ref 0–0)
PHOSPHATE SERPL-MCNC: 2.6 MG/DL — SIGNIFICANT CHANGE UP (ref 2.5–4.5)
PLATELET # BLD AUTO: 209 K/UL — SIGNIFICANT CHANGE UP (ref 150–400)
POTASSIUM SERPL-MCNC: 3.2 MMOL/L — LOW (ref 3.5–5.3)
POTASSIUM SERPL-SCNC: 3.2 MMOL/L — LOW (ref 3.5–5.3)
RBC # BLD: 3.38 M/UL — LOW (ref 3.8–5.2)
RBC # FLD: 18.5 % — HIGH (ref 10.3–14.5)
SODIUM SERPL-SCNC: 136 MMOL/L — SIGNIFICANT CHANGE UP (ref 135–145)
WBC # BLD: 6.85 K/UL — SIGNIFICANT CHANGE UP (ref 3.8–10.5)
WBC # FLD AUTO: 6.85 K/UL — SIGNIFICANT CHANGE UP (ref 3.8–10.5)

## 2022-10-29 PROCEDURE — 99232 SBSQ HOSP IP/OBS MODERATE 35: CPT

## 2022-10-29 RX ORDER — POTASSIUM CHLORIDE 20 MEQ
40 PACKET (EA) ORAL
Refills: 0 | Status: COMPLETED | OUTPATIENT
Start: 2022-10-29 | End: 2022-10-30

## 2022-10-29 RX ADMIN — ENOXAPARIN SODIUM 40 MILLIGRAM(S): 100 INJECTION SUBCUTANEOUS at 13:50

## 2022-10-29 RX ADMIN — Medication 40 MILLIEQUIVALENT(S): at 17:32

## 2022-10-29 RX ADMIN — MONTELUKAST 10 MILLIGRAM(S): 4 TABLET, CHEWABLE ORAL at 22:35

## 2022-10-29 RX ADMIN — Medication 100 MILLIGRAM(S): at 02:56

## 2022-10-29 RX ADMIN — Medication 5000 UNIT(S): at 13:51

## 2022-10-29 RX ADMIN — Medication 650 MILLIGRAM(S): at 05:42

## 2022-10-29 RX ADMIN — SENNA PLUS 2 TABLET(S): 8.6 TABLET ORAL at 22:36

## 2022-10-29 RX ADMIN — Medication 650 MILLIGRAM(S): at 06:42

## 2022-10-29 RX ADMIN — Medication 1 TABLET(S): at 13:51

## 2022-10-29 RX ADMIN — PIPERACILLIN AND TAZOBACTAM 25 GRAM(S): 4; .5 INJECTION, POWDER, LYOPHILIZED, FOR SOLUTION INTRAVENOUS at 22:38

## 2022-10-29 RX ADMIN — PIPERACILLIN AND TAZOBACTAM 25 GRAM(S): 4; .5 INJECTION, POWDER, LYOPHILIZED, FOR SOLUTION INTRAVENOUS at 05:33

## 2022-10-29 RX ADMIN — SODIUM CHLORIDE 100 MILLILITER(S): 9 INJECTION INTRAMUSCULAR; INTRAVENOUS; SUBCUTANEOUS at 22:54

## 2022-10-29 RX ADMIN — SODIUM CHLORIDE 100 MILLILITER(S): 9 INJECTION INTRAMUSCULAR; INTRAVENOUS; SUBCUTANEOUS at 13:49

## 2022-10-29 RX ADMIN — Medication 650 MILLIGRAM(S): at 14:54

## 2022-10-29 RX ADMIN — PANTOPRAZOLE SODIUM 40 MILLIGRAM(S): 20 TABLET, DELAYED RELEASE ORAL at 05:34

## 2022-10-29 RX ADMIN — Medication 650 MILLIGRAM(S): at 13:54

## 2022-10-29 RX ADMIN — GABAPENTIN 300 MILLIGRAM(S): 400 CAPSULE ORAL at 17:33

## 2022-10-29 RX ADMIN — Medication 100 MILLIGRAM(S): at 22:53

## 2022-10-29 RX ADMIN — Medication 1 GRAM(S): at 05:34

## 2022-10-29 RX ADMIN — ONDANSETRON 4 MILLIGRAM(S): 8 TABLET, FILM COATED ORAL at 05:34

## 2022-10-29 RX ADMIN — FLUCONAZOLE 100 MILLIGRAM(S): 150 TABLET ORAL at 02:13

## 2022-10-29 RX ADMIN — PIPERACILLIN AND TAZOBACTAM 25 GRAM(S): 4; .5 INJECTION, POWDER, LYOPHILIZED, FOR SOLUTION INTRAVENOUS at 13:50

## 2022-10-29 RX ADMIN — Medication 1: at 17:33

## 2022-10-29 RX ADMIN — ONDANSETRON 4 MILLIGRAM(S): 8 TABLET, FILM COATED ORAL at 13:51

## 2022-10-29 RX ADMIN — ONDANSETRON 4 MILLIGRAM(S): 8 TABLET, FILM COATED ORAL at 22:36

## 2022-10-29 RX ADMIN — GABAPENTIN 300 MILLIGRAM(S): 400 CAPSULE ORAL at 05:34

## 2022-10-29 RX ADMIN — Medication 1 GRAM(S): at 17:33

## 2022-10-29 NOTE — PROGRESS NOTE ADULT - SUBJECTIVE AND OBJECTIVE BOX
Hospitalist Progress Note  Aimee Barraza MD Pager # [phone]    OVERNIGHT EVENTS: JAILYN    SUBJECTIVE / INTERVAL HPI: Patient seen and examined at bedside. Daughter provides translation at bedside. Pt. reporting ongoing coughing throughout the night which makes it difficult for her to sleep. She continues to have pain when she swallows but it has improved.     VITAL SIGNS:  Vital Signs Last 24 Hrs  T(C): 36.7 (29 Oct 2022 05:26), Max: 36.9 (28 Oct 2022 21:54)  T(F): 98.1 (29 Oct 2022 05:26), Max: 98.4 (28 Oct 2022 21:54)  HR: 87 (29 Oct 2022 05:26) (87 - 90)  BP: 119/62 (29 Oct 2022 05:26) (110/54 - 119/62)  BP(mean): --  RR: 18 (29 Oct 2022 05:26) (18 - 19)  SpO2: 100% (29 Oct 2022 05:26) (99% - 100%)    Parameters below as of 29 Oct 2022 05:26  Patient On (Oxygen Delivery Method): room air        PHYSICAL EXAM:    General: Elderly female resting in bed - more awake/alert. Not in distress.   HEENT: NC/AT; PERRL, anicteric sclera; MMM  Neck: supple  Cardiovascular: +S1/S2; RRR  Respiratory: CTA B/L; no W/R/R  Gastrointestinal: soft, NT/ND; +BSx4  Extremities: WWP; no edema, clubbing or cyanosis  Vascular: 2+ radial, DP/PT pulses B/L  Neurological: AAOx3; no focal deficits    MEDICATIONS:  MEDICATIONS  (STANDING):  cholecalciferol 5000 Unit(s) Oral daily  dextrose 50% Injectable 25 Gram(s) IV Push once  dextrose 50% Injectable 12.5 Gram(s) IV Push once  dextrose 50% Injectable 25 Gram(s) IV Push once  dextrose Oral Gel 15 Gram(s) Oral once  enoxaparin Injectable 40 milliGRAM(s) SubCutaneous every 24 hours  fluconAZOLE IVPB 200 milliGRAM(s) IV Intermittent every 24 hours  gabapentin 300 milliGRAM(s) Oral two times a day  glucagon  Injectable 1 milliGRAM(s) IntraMuscular once  influenza   Vaccine 0.5 milliLiter(s) IntraMuscular once  insulin lispro (ADMELOG) corrective regimen sliding scale   SubCutaneous three times a day before meals  insulin lispro (ADMELOG) corrective regimen sliding scale   SubCutaneous at bedtime  montelukast 10 milliGRAM(s) Oral at bedtime  multivitamin 1 Tablet(s) Oral daily  naloxone Injectable 0.4 milliGRAM(s) IV Push once  ondansetron   Disintegrating Tablet 4 milliGRAM(s) Oral every 8 hours  pantoprazole   Suspension 40 milliGRAM(s) Oral before breakfast  piperacillin/tazobactam IVPB.. 3.375 Gram(s) IV Intermittent every 8 hours  polyethylene glycol 3350 17 Gram(s) Oral daily  potassium chloride   Powder 40 milliEquivalent(s) Oral two times a day  senna 2 Tablet(s) Oral at bedtime  sodium chloride 0.9%. 1000 milliLiter(s) (100 mL/Hr) IV Continuous <Continuous>  sucralfate 1 Gram(s) Oral two times a day    MEDICATIONS  (PRN):  acetaminophen     Tablet .. 650 milliGRAM(s) Oral every 6 hours PRN Temp greater or equal to 38C (100.4F), Mild Pain (1 - 3)  benzonatate 100 milliGRAM(s) Oral three times a day PRN Cough  bisacodyl 5 milliGRAM(s) Oral daily PRN Constipation  FIRST- Mouthwash  BLM 15 milliLiter(s) Swish and Spit every 8 hours PRN Mouth Care, pain  guaiFENesin Oral Liquid (Sugar-Free) 100 milliGRAM(s) Oral every 6 hours PRN Cough  morphine  - Injectable 2 milliGRAM(s) IV Push every 4 hours PRN Moderate Pain (4 - 6)  morphine  - Injectable 4 milliGRAM(s) IV Push every 4 hours PRN Severe Pain (7 - 10)      ALLERGIES:  Allergies    No Known Allergies    Intolerances        LABS:                        8.2    6.85  )-----------( 209      ( 29 Oct 2022 06:29 )             25.8     10-29    136  |  105  |  3<L>  ----------------------------<  125<H>  3.2<L>   |  22  |  0.54    Ca    8.1<L>      29 Oct 2022 06:29  Phos  2.6     10-29  Mg     1.70     10-29          CAPILLARY BLOOD GLUCOSE      POCT Blood Glucose.: 135 mg/dL (29 Oct 2022 11:27)      RADIOLOGY & ADDITIONAL TESTS: Reviewed.    ASSESSMENT:    PLAN:

## 2022-10-30 LAB
ANION GAP SERPL CALC-SCNC: 11 MMOL/L — SIGNIFICANT CHANGE UP (ref 7–14)
BLD GP AB SCN SERPL QL: NEGATIVE — SIGNIFICANT CHANGE UP
BUN SERPL-MCNC: 3 MG/DL — LOW (ref 7–23)
CALCIUM SERPL-MCNC: 8.5 MG/DL — SIGNIFICANT CHANGE UP (ref 8.4–10.5)
CHLORIDE SERPL-SCNC: 101 MMOL/L — SIGNIFICANT CHANGE UP (ref 98–107)
CO2 SERPL-SCNC: 20 MMOL/L — LOW (ref 22–31)
CREAT SERPL-MCNC: 0.55 MG/DL — SIGNIFICANT CHANGE UP (ref 0.5–1.3)
CULTURE RESULTS: SIGNIFICANT CHANGE UP
CULTURE RESULTS: SIGNIFICANT CHANGE UP
EGFR: 108 ML/MIN/1.73M2 — SIGNIFICANT CHANGE UP
GLUCOSE BLDC GLUCOMTR-MCNC: 120 MG/DL — HIGH (ref 70–99)
GLUCOSE BLDC GLUCOMTR-MCNC: 135 MG/DL — HIGH (ref 70–99)
GLUCOSE BLDC GLUCOMTR-MCNC: 153 MG/DL — HIGH (ref 70–99)
GLUCOSE BLDC GLUCOMTR-MCNC: 158 MG/DL — HIGH (ref 70–99)
GLUCOSE SERPL-MCNC: 145 MG/DL — HIGH (ref 70–99)
HCT VFR BLD CALC: 26.6 % — LOW (ref 34.5–45)
HGB BLD-MCNC: 8.4 G/DL — LOW (ref 11.5–15.5)
MAGNESIUM SERPL-MCNC: 1.7 MG/DL — SIGNIFICANT CHANGE UP (ref 1.6–2.6)
MCHC RBC-ENTMCNC: 24.1 PG — LOW (ref 27–34)
MCHC RBC-ENTMCNC: 31.6 GM/DL — LOW (ref 32–36)
MCV RBC AUTO: 76.4 FL — LOW (ref 80–100)
NRBC # BLD: 0 /100 WBCS — SIGNIFICANT CHANGE UP (ref 0–0)
NRBC # FLD: 0 K/UL — SIGNIFICANT CHANGE UP (ref 0–0)
PHOSPHATE SERPL-MCNC: 2.3 MG/DL — LOW (ref 2.5–4.5)
PLATELET # BLD AUTO: 215 K/UL — SIGNIFICANT CHANGE UP (ref 150–400)
POTASSIUM SERPL-MCNC: 3.6 MMOL/L — SIGNIFICANT CHANGE UP (ref 3.5–5.3)
POTASSIUM SERPL-SCNC: 3.6 MMOL/L — SIGNIFICANT CHANGE UP (ref 3.5–5.3)
RBC # BLD: 3.48 M/UL — LOW (ref 3.8–5.2)
RBC # FLD: 19.3 % — HIGH (ref 10.3–14.5)
RH IG SCN BLD-IMP: POSITIVE — SIGNIFICANT CHANGE UP
SODIUM SERPL-SCNC: 132 MMOL/L — LOW (ref 135–145)
SPECIMEN SOURCE: SIGNIFICANT CHANGE UP
SPECIMEN SOURCE: SIGNIFICANT CHANGE UP
WBC # BLD: 6.53 K/UL — SIGNIFICANT CHANGE UP (ref 3.8–10.5)
WBC # FLD AUTO: 6.53 K/UL — SIGNIFICANT CHANGE UP (ref 3.8–10.5)

## 2022-10-30 PROCEDURE — 99232 SBSQ HOSP IP/OBS MODERATE 35: CPT

## 2022-10-30 RX ORDER — POTASSIUM PHOSPHATE, MONOBASIC POTASSIUM PHOSPHATE, DIBASIC 236; 224 MG/ML; MG/ML
15 INJECTION, SOLUTION INTRAVENOUS ONCE
Refills: 0 | Status: COMPLETED | OUTPATIENT
Start: 2022-10-30 | End: 2022-10-30

## 2022-10-30 RX ADMIN — Medication 1 GRAM(S): at 17:21

## 2022-10-30 RX ADMIN — ENOXAPARIN SODIUM 40 MILLIGRAM(S): 100 INJECTION SUBCUTANEOUS at 13:07

## 2022-10-30 RX ADMIN — PIPERACILLIN AND TAZOBACTAM 25 GRAM(S): 4; .5 INJECTION, POWDER, LYOPHILIZED, FOR SOLUTION INTRAVENOUS at 13:07

## 2022-10-30 RX ADMIN — SODIUM CHLORIDE 100 MILLILITER(S): 9 INJECTION INTRAMUSCULAR; INTRAVENOUS; SUBCUTANEOUS at 13:07

## 2022-10-30 RX ADMIN — Medication 1 GRAM(S): at 07:23

## 2022-10-30 RX ADMIN — PANTOPRAZOLE SODIUM 40 MILLIGRAM(S): 20 TABLET, DELAYED RELEASE ORAL at 05:10

## 2022-10-30 RX ADMIN — PIPERACILLIN AND TAZOBACTAM 25 GRAM(S): 4; .5 INJECTION, POWDER, LYOPHILIZED, FOR SOLUTION INTRAVENOUS at 05:09

## 2022-10-30 RX ADMIN — ONDANSETRON 4 MILLIGRAM(S): 8 TABLET, FILM COATED ORAL at 21:43

## 2022-10-30 RX ADMIN — ONDANSETRON 4 MILLIGRAM(S): 8 TABLET, FILM COATED ORAL at 13:07

## 2022-10-30 RX ADMIN — ONDANSETRON 4 MILLIGRAM(S): 8 TABLET, FILM COATED ORAL at 05:12

## 2022-10-30 RX ADMIN — Medication 1 TABLET(S): at 13:08

## 2022-10-30 RX ADMIN — FLUCONAZOLE 100 MILLIGRAM(S): 150 TABLET ORAL at 02:54

## 2022-10-30 RX ADMIN — GABAPENTIN 300 MILLIGRAM(S): 400 CAPSULE ORAL at 05:09

## 2022-10-30 RX ADMIN — POLYETHYLENE GLYCOL 3350 17 GRAM(S): 17 POWDER, FOR SOLUTION ORAL at 13:08

## 2022-10-30 RX ADMIN — POTASSIUM PHOSPHATE, MONOBASIC POTASSIUM PHOSPHATE, DIBASIC 62.5 MILLIMOLE(S): 236; 224 INJECTION, SOLUTION INTRAVENOUS at 13:07

## 2022-10-30 RX ADMIN — Medication 1: at 08:32

## 2022-10-30 RX ADMIN — MONTELUKAST 10 MILLIGRAM(S): 4 TABLET, CHEWABLE ORAL at 21:42

## 2022-10-30 RX ADMIN — Medication 40 MILLIEQUIVALENT(S): at 05:09

## 2022-10-30 RX ADMIN — GABAPENTIN 300 MILLIGRAM(S): 400 CAPSULE ORAL at 17:22

## 2022-10-30 RX ADMIN — PIPERACILLIN AND TAZOBACTAM 25 GRAM(S): 4; .5 INJECTION, POWDER, LYOPHILIZED, FOR SOLUTION INTRAVENOUS at 21:42

## 2022-10-30 RX ADMIN — Medication 5000 UNIT(S): at 13:08

## 2022-10-30 NOTE — PROGRESS NOTE ADULT - SUBJECTIVE AND OBJECTIVE BOX
Hospitalist Progress Note  Aimee Barraza MD Pager # [phone]    OVERNIGHT EVENTS: JAILYN    SUBJECTIVE / INTERVAL HPI: Patient seen and examined at bedside. Pt. reports ongoing cough overnight. Pain with swallowing is improving. Overall, feeling better.     VITAL SIGNS:  Vital Signs Last 24 Hrs  T(C): 36.4 (30 Oct 2022 11:41), Max: 36.8 (29 Oct 2022 13:15)  T(F): 97.5 (30 Oct 2022 11:41), Max: 98.2 (29 Oct 2022 13:15)  HR: 81 (30 Oct 2022 11:41) (78 - 85)  BP: 106/69 (30 Oct 2022 11:41) (106/69 - 123/74)  BP(mean): --  RR: 18 (30 Oct 2022 11:41) (18 - 18)  SpO2: 97% (30 Oct 2022 11:41) (95% - 98%)    Parameters below as of 30 Oct 2022 11:41  Patient On (Oxygen Delivery Method): room air        PHYSICAL EXAM:    General: Well appearing female resting in bed  HEENT: NC/AT; PERRL, anicteric sclera; MMM  Neck: supple  Cardiovascular: +S1/S2; RRR  Respiratory: CTA B/L; no W/R/R  Gastrointestinal: soft, NT/ND; +BSx4  Extremities: WWP; no edema, clubbing or cyanosis  Vascular: 2+ radial, DP/PT pulses B/L  Neurological: AAOx3; no focal deficits    MEDICATIONS:  MEDICATIONS  (STANDING):  cholecalciferol 5000 Unit(s) Oral daily  dextrose 50% Injectable 25 Gram(s) IV Push once  dextrose 50% Injectable 12.5 Gram(s) IV Push once  dextrose 50% Injectable 25 Gram(s) IV Push once  dextrose Oral Gel 15 Gram(s) Oral once  enoxaparin Injectable 40 milliGRAM(s) SubCutaneous every 24 hours  fluconAZOLE IVPB 200 milliGRAM(s) IV Intermittent every 24 hours  gabapentin 300 milliGRAM(s) Oral two times a day  glucagon  Injectable 1 milliGRAM(s) IntraMuscular once  influenza   Vaccine 0.5 milliLiter(s) IntraMuscular once  insulin lispro (ADMELOG) corrective regimen sliding scale   SubCutaneous three times a day before meals  insulin lispro (ADMELOG) corrective regimen sliding scale   SubCutaneous at bedtime  montelukast 10 milliGRAM(s) Oral at bedtime  multivitamin 1 Tablet(s) Oral daily  naloxone Injectable 0.4 milliGRAM(s) IV Push once  ondansetron   Disintegrating Tablet 4 milliGRAM(s) Oral every 8 hours  pantoprazole   Suspension 40 milliGRAM(s) Oral before breakfast  piperacillin/tazobactam IVPB.. 3.375 Gram(s) IV Intermittent every 8 hours  polyethylene glycol 3350 17 Gram(s) Oral daily  potassium phosphate IVPB 15 milliMole(s) IV Intermittent once  senna 2 Tablet(s) Oral at bedtime  sodium chloride 0.9%. 1000 milliLiter(s) (100 mL/Hr) IV Continuous <Continuous>  sucralfate 1 Gram(s) Oral two times a day    MEDICATIONS  (PRN):  acetaminophen     Tablet .. 650 milliGRAM(s) Oral every 6 hours PRN Temp greater or equal to 38C (100.4F), Mild Pain (1 - 3)  benzonatate 100 milliGRAM(s) Oral three times a day PRN Cough  bisacodyl 5 milliGRAM(s) Oral daily PRN Constipation  FIRST- Mouthwash  BLM 15 milliLiter(s) Swish and Spit every 8 hours PRN Mouth Care, pain  guaiFENesin Oral Liquid (Sugar-Free) 100 milliGRAM(s) Oral every 6 hours PRN Cough  morphine  - Injectable 2 milliGRAM(s) IV Push every 4 hours PRN Moderate Pain (4 - 6)  morphine  - Injectable 4 milliGRAM(s) IV Push every 4 hours PRN Severe Pain (7 - 10)      ALLERGIES:  Allergies    No Known Allergies    Intolerances        LABS:                        8.4    6.53  )-----------( 215      ( 30 Oct 2022 07:01 )             26.6     10-30    132<L>  |  101  |  3<L>  ----------------------------<  145<H>  3.6   |  20<L>  |  0.55    Ca    8.5      30 Oct 2022 07:01  Phos  2.3     10-30  Mg     1.70     10-30          CAPILLARY BLOOD GLUCOSE      POCT Blood Glucose.: 153 mg/dL (30 Oct 2022 08:31)      RADIOLOGY & ADDITIONAL TESTS: Reviewed.    ASSESSMENT:    PLAN:

## 2022-10-30 NOTE — PROGRESS NOTE ADULT - PROBLEM SELECTOR PLAN 1
Sepsis 2/2 UTI. UA positive. Febrile to 102.8 yesterday. No leukocytosis. Pt. is immunocompromised in the setting of malignancy, chemo and radiation. CXR showing a YUNI consolidation. CT chest performed showing a post-obstructive PNA. Broadened antibiotics. Remains afebrile.   - UCx with E.coli resistant to cephalosporins.  - F/u BCx from 10/25 - NGTD   - MRSA swab negative - d/c vanc  - C/w Zosyn - last day is 10/30 ( today)    #Post obstructive PNA   - CT chest with YUNI PNA. Respiratory status stable. Not requiring O2. ID consulted. Initially broadened to vanc/zosyn. D/c'ed vanc. C/w Zosyn (last day is today 10/30). Improving.

## 2022-10-31 LAB
ANION GAP SERPL CALC-SCNC: 9 MMOL/L — SIGNIFICANT CHANGE UP (ref 7–14)
BUN SERPL-MCNC: 4 MG/DL — LOW (ref 7–23)
CALCIUM SERPL-MCNC: 8.6 MG/DL — SIGNIFICANT CHANGE UP (ref 8.4–10.5)
CHLORIDE SERPL-SCNC: 102 MMOL/L — SIGNIFICANT CHANGE UP (ref 98–107)
CO2 SERPL-SCNC: 23 MMOL/L — SIGNIFICANT CHANGE UP (ref 22–31)
CREAT SERPL-MCNC: 0.64 MG/DL — SIGNIFICANT CHANGE UP (ref 0.5–1.3)
EGFR: 104 ML/MIN/1.73M2 — SIGNIFICANT CHANGE UP
GLUCOSE BLDC GLUCOMTR-MCNC: 115 MG/DL — HIGH (ref 70–99)
GLUCOSE BLDC GLUCOMTR-MCNC: 144 MG/DL — HIGH (ref 70–99)
GLUCOSE BLDC GLUCOMTR-MCNC: 161 MG/DL — HIGH (ref 70–99)
GLUCOSE BLDC GLUCOMTR-MCNC: 170 MG/DL — HIGH (ref 70–99)
GLUCOSE SERPL-MCNC: 171 MG/DL — HIGH (ref 70–99)
HCT VFR BLD CALC: 27.5 % — LOW (ref 34.5–45)
HGB BLD-MCNC: 8.7 G/DL — LOW (ref 11.5–15.5)
MAGNESIUM SERPL-MCNC: 1.9 MG/DL — SIGNIFICANT CHANGE UP (ref 1.6–2.6)
MCHC RBC-ENTMCNC: 24.2 PG — LOW (ref 27–34)
MCHC RBC-ENTMCNC: 31.6 GM/DL — LOW (ref 32–36)
MCV RBC AUTO: 76.4 FL — LOW (ref 80–100)
NRBC # BLD: 0 /100 WBCS — SIGNIFICANT CHANGE UP (ref 0–0)
NRBC # FLD: 0 K/UL — SIGNIFICANT CHANGE UP (ref 0–0)
PHOSPHATE SERPL-MCNC: 3.2 MG/DL — SIGNIFICANT CHANGE UP (ref 2.5–4.5)
PLATELET # BLD AUTO: 227 K/UL — SIGNIFICANT CHANGE UP (ref 150–400)
POTASSIUM SERPL-MCNC: 3.6 MMOL/L — SIGNIFICANT CHANGE UP (ref 3.5–5.3)
POTASSIUM SERPL-SCNC: 3.6 MMOL/L — SIGNIFICANT CHANGE UP (ref 3.5–5.3)
RBC # BLD: 3.6 M/UL — LOW (ref 3.8–5.2)
RBC # FLD: 19.9 % — HIGH (ref 10.3–14.5)
SODIUM SERPL-SCNC: 134 MMOL/L — LOW (ref 135–145)
WBC # BLD: 6.78 K/UL — SIGNIFICANT CHANGE UP (ref 3.8–10.5)
WBC # FLD AUTO: 6.78 K/UL — SIGNIFICANT CHANGE UP (ref 3.8–10.5)

## 2022-10-31 PROCEDURE — 99232 SBSQ HOSP IP/OBS MODERATE 35: CPT

## 2022-10-31 RX ADMIN — Medication 1 GRAM(S): at 17:46

## 2022-10-31 RX ADMIN — GABAPENTIN 300 MILLIGRAM(S): 400 CAPSULE ORAL at 05:39

## 2022-10-31 RX ADMIN — PANTOPRAZOLE SODIUM 40 MILLIGRAM(S): 20 TABLET, DELAYED RELEASE ORAL at 05:39

## 2022-10-31 RX ADMIN — ENOXAPARIN SODIUM 40 MILLIGRAM(S): 100 INJECTION SUBCUTANEOUS at 12:40

## 2022-10-31 RX ADMIN — ONDANSETRON 4 MILLIGRAM(S): 8 TABLET, FILM COATED ORAL at 22:33

## 2022-10-31 RX ADMIN — Medication 1 TABLET(S): at 12:40

## 2022-10-31 RX ADMIN — FLUCONAZOLE 100 MILLIGRAM(S): 150 TABLET ORAL at 02:50

## 2022-10-31 RX ADMIN — ONDANSETRON 4 MILLIGRAM(S): 8 TABLET, FILM COATED ORAL at 05:38

## 2022-10-31 RX ADMIN — Medication 5000 UNIT(S): at 12:38

## 2022-10-31 RX ADMIN — Medication 1: at 12:33

## 2022-10-31 RX ADMIN — GABAPENTIN 300 MILLIGRAM(S): 400 CAPSULE ORAL at 17:46

## 2022-10-31 RX ADMIN — Medication 1: at 17:45

## 2022-10-31 RX ADMIN — POLYETHYLENE GLYCOL 3350 17 GRAM(S): 17 POWDER, FOR SOLUTION ORAL at 12:42

## 2022-10-31 RX ADMIN — Medication 1 GRAM(S): at 05:37

## 2022-10-31 RX ADMIN — PIPERACILLIN AND TAZOBACTAM 25 GRAM(S): 4; .5 INJECTION, POWDER, LYOPHILIZED, FOR SOLUTION INTRAVENOUS at 05:36

## 2022-10-31 RX ADMIN — ONDANSETRON 4 MILLIGRAM(S): 8 TABLET, FILM COATED ORAL at 14:58

## 2022-10-31 RX ADMIN — MONTELUKAST 10 MILLIGRAM(S): 4 TABLET, CHEWABLE ORAL at 22:33

## 2022-10-31 NOTE — PROGRESS NOTE ADULT - PROBLEM SELECTOR PLAN 2
Odynophagia with dysphagia to solids and liquids 2/2 pain. 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 Tylenol does not help to relieve the pain. Morphine 4mg IV made her dizzy. Pain is improving.   - C/w morphine 2mg IV q4 PRN   - S&S consult - not aspirating  - Soft diet for now in the setting of pain. Counseled family on low acidity foods for now.  - C/w Fluconazole   - Hold statin while on fluconazole (on atorvastatin 10mgqd at home).   - ID consulted, f/u recommendations.

## 2022-10-31 NOTE — PROGRESS NOTE ADULT - SUBJECTIVE AND OBJECTIVE BOX
Sammy Ramos MD  Academic Hospitalist  Pager [phone]  Email: [email or web address]  Available on [employer] Teams        PROGRESS NOTE:     Patient is a 57y old  Female who presents with a chief complaint of fevers and chills (30 Oct 2022 11:45)      SUBJECTIVE / OVERNIGHT EVENTS:  Patient seen and examined this morning. Still has some pain related to her esophagitis, though much more tolerable at this time.     Plan of care discussed with the patient's daughter who was at bedside.    ADDITIONAL REVIEW OF SYSTEMS:  No f/c/n/v    MEDICATIONS  (STANDING):  cholecalciferol 5000 Unit(s) Oral daily  dextrose 50% Injectable 25 Gram(s) IV Push once  dextrose 50% Injectable 12.5 Gram(s) IV Push once  dextrose 50% Injectable 25 Gram(s) IV Push once  dextrose Oral Gel 15 Gram(s) Oral once  enoxaparin Injectable 40 milliGRAM(s) SubCutaneous every 24 hours  fluconAZOLE IVPB 200 milliGRAM(s) IV Intermittent every 24 hours  gabapentin 300 milliGRAM(s) Oral two times a day  glucagon  Injectable 1 milliGRAM(s) IntraMuscular once  influenza   Vaccine 0.5 milliLiter(s) IntraMuscular once  insulin lispro (ADMELOG) corrective regimen sliding scale   SubCutaneous three times a day before meals  insulin lispro (ADMELOG) corrective regimen sliding scale   SubCutaneous at bedtime  montelukast 10 milliGRAM(s) Oral at bedtime  multivitamin 1 Tablet(s) Oral daily  naloxone Injectable 0.4 milliGRAM(s) IV Push once  ondansetron   Disintegrating Tablet 4 milliGRAM(s) Oral every 8 hours  pantoprazole   Suspension 40 milliGRAM(s) Oral before breakfast  polyethylene glycol 3350 17 Gram(s) Oral daily  senna 2 Tablet(s) Oral at bedtime  sucralfate 1 Gram(s) Oral two times a day    MEDICATIONS  (PRN):  acetaminophen     Tablet .. 650 milliGRAM(s) Oral every 6 hours PRN Temp greater or equal to 38C (100.4F), Mild Pain (1 - 3)  benzonatate 100 milliGRAM(s) Oral three times a day PRN Cough  bisacodyl 5 milliGRAM(s) Oral daily PRN Constipation  FIRST- Mouthwash  BLM 15 milliLiter(s) Swish and Spit every 8 hours PRN Mouth Care, pain  guaiFENesin Oral Liquid (Sugar-Free) 100 milliGRAM(s) Oral every 6 hours PRN Cough  morphine  - Injectable 4 milliGRAM(s) IV Push every 4 hours PRN Severe Pain (7 - 10)  morphine  - Injectable 2 milliGRAM(s) IV Push every 4 hours PRN Moderate Pain (4 - 6)      CAPILLARY BLOOD GLUCOSE      POCT Blood Glucose.: 161 mg/dL (31 Oct 2022 11:43)  POCT Blood Glucose.: 144 mg/dL (31 Oct 2022 07:39)  POCT Blood Glucose.: 158 mg/dL (30 Oct 2022 21:36)  POCT Blood Glucose.: 120 mg/dL (30 Oct 2022 16:52)    I&O's Summary      PHYSICAL EXAM:  Vital Signs Last 24 Hrs  T(C): 36.7 (31 Oct 2022 12:57), Max: 37 (31 Oct 2022 05:00)  T(F): 98 (31 Oct 2022 12:57), Max: 98.6 (31 Oct 2022 05:00)  HR: 78 (31 Oct 2022 12:57) (78 - 88)  BP: 105/65 (31 Oct 2022 12:57) (105/65 - 118/76)  BP(mean): --  RR: 18 (31 Oct 2022 12:57) (18 - 18)  SpO2: 100% (31 Oct 2022 12:57) (100% - 100%)    Parameters below as of 31 Oct 2022 12:57  Patient On (Oxygen Delivery Method): room air        CGeneral: Well appearing female resting in bed  HEENT: NC/AT; PERRL, anicteric sclera; MMM  Neck: supple  Cardiovascular: +S1/S2; RRR  Respiratory: CTA B/L; no W/R/R  Gastrointestinal: soft, NT/ND; +BSx4  Extremities: WWP; no edema, clubbing or cyanosis  Vascular: 2+ radial, DP/PT pulses B/L  Neurological: AAOx3; no focal deficits      LABS:                        8.7    6.78  )-----------( 227      ( 31 Oct 2022 06:02 )             27.5     10-31    134<L>  |  102  |  4<L>  ----------------------------<  171<H>  3.6   |  23  |  0.64    Ca    8.6      31 Oct 2022 06:02  Phos  3.2     10-31  Mg     1.90     10-31                  RADIOLOGY & ADDITIONAL TESTS:  Results Reviewed:   Imaging Personally Reviewed:  Electrocardiogram Personally Reviewed:    COORDINATION OF CARE:  Care Discussed with Consultants/Other Providers [Y/N]:  Prior or Outpatient Records Reviewed [Y/N]:

## 2022-10-31 NOTE — PROGRESS NOTE ADULT - PROBLEM SELECTOR PLAN 1
Sepsis 2/2 UTI. UA positive. Febrile to 102.8 yesterday. No leukocytosis. Pt. is immunocompromised in the setting of malignancy, chemo and radiation. CXR showing a YUNI consolidation. CT chest performed showing a post-obstructive PNA. Broadened antibiotics. Remains afebrile.   - UCx with E.coli resistant to cephalosporins.  - F/u BCx from 10/25 - NGTD   - MRSA swab negative - d/c vanc  - C/w Zosyn - last day is 10/30 ( today)    #Post obstructive PNA   - CT chest with YUNI PNA. Respiratory status stable. Not requiring O2. ID consulted. Initially broadened to vanc/zosyn. D/c'ed vanc. C/w Zosyn (last day was 10/30). Improving. Discussed with Dr. Collins, awaiting for final recs.

## 2022-10-31 NOTE — PROGRESS NOTE ADULT - SUBJECTIVE AND OBJECTIVE BOX
CC: Patient is a 57y old  Female who presents with a chief complaint of fevers and chills (31 Oct 2022 13:26)    ID following for fever    Interval History/ROS: Remains with odynophagia overall improving. Remains with cough, clear sputum production. Fevers resolved.    Rest of ROS negative.    Allergies  No Known Allergies    ANTIMICROBIALS:  fluconAZOLE IVPB 200 every 24 hours    OTHER MEDS:  acetaminophen     Tablet .. 650 milliGRAM(s) Oral every 6 hours PRN  benzonatate 100 milliGRAM(s) Oral three times a day PRN  bisacodyl 5 milliGRAM(s) Oral daily PRN  cholecalciferol 5000 Unit(s) Oral daily  dextrose 50% Injectable 25 Gram(s) IV Push once  dextrose 50% Injectable 12.5 Gram(s) IV Push once  dextrose 50% Injectable 25 Gram(s) IV Push once  dextrose Oral Gel 15 Gram(s) Oral once  enoxaparin Injectable 40 milliGRAM(s) SubCutaneous every 24 hours  FIRST- Mouthwash  BLM 15 milliLiter(s) Swish and Spit every 8 hours PRN  gabapentin 300 milliGRAM(s) Oral two times a day  glucagon  Injectable 1 milliGRAM(s) IntraMuscular once  guaiFENesin Oral Liquid (Sugar-Free) 100 milliGRAM(s) Oral every 6 hours PRN  influenza   Vaccine 0.5 milliLiter(s) IntraMuscular once  insulin lispro (ADMELOG) corrective regimen sliding scale   SubCutaneous three times a day before meals  insulin lispro (ADMELOG) corrective regimen sliding scale   SubCutaneous at bedtime  montelukast 10 milliGRAM(s) Oral at bedtime  morphine  - Injectable 4 milliGRAM(s) IV Push every 4 hours PRN  morphine  - Injectable 2 milliGRAM(s) IV Push every 4 hours PRN  multivitamin 1 Tablet(s) Oral daily  naloxone Injectable 0.4 milliGRAM(s) IV Push once  ondansetron   Disintegrating Tablet 4 milliGRAM(s) Oral every 8 hours  pantoprazole   Suspension 40 milliGRAM(s) Oral before breakfast  polyethylene glycol 3350 17 Gram(s) Oral daily  senna 2 Tablet(s) Oral at bedtime  sucralfate 1 Gram(s) Oral two times a day    PE:    Vital Signs Last 24 Hrs  T(C): 36.7 (31 Oct 2022 12:57), Max: 37 (31 Oct 2022 05:00)  T(F): 98 (31 Oct 2022 12:57), Max: 98.6 (31 Oct 2022 05:00)  HR: 78 (31 Oct 2022 12:57) (78 - 88)  BP: 105/65 (31 Oct 2022 12:57) (105/65 - 118/76)  BP(mean): --  RR: 18 (31 Oct 2022 12:57) (18 - 18)  SpO2: 100% (31 Oct 2022 12:57) (100% - 100%)    Parameters below as of 31 Oct 2022 12:57  Patient On (Oxygen Delivery Method): room air    Gen: AOx3, NAD  CV: S1+S2 normal, no murmurs  Resp: Clear bilat, no resp distress  Abd: Soft, nontender, +BS  Ext: No LE edema, no wounds  : No Swanson  IV/Skin: No thrombophlebitis, mediport intact  Neuro: no focal deficits    LABS:                          8.7    6.78  )-----------( 227      ( 31 Oct 2022 06:02 )             27.5       10-31    134<L>  |  102  |  4<L>  ----------------------------<  171<H>  3.6   |  23  |  0.64    Ca    8.6      31 Oct 2022 06:02  Phos  3.2     10-31  Mg     1.90     10-31            MICROBIOLOGY:  v  .Sputum Sputum  10-26-22   Normal Respiratory Danielle present  --    Rare polymorphonuclear leukocytes per low power field  Rare Squamous epithelial cells per low power field  Rare Yeast like cells seen per oil power field  Rare Gram Negative Rods seen per oil power field  Few Gram positive cocci in pairs seen per oil power field      .Blood Blood-Peripheral  10-25-22   No Growth Final  --  --      .Blood Blood-Venous  10-25-22   No Growth Final  --  --      .Blood Blood-Peripheral  10-22-22   No Growth Final  --  --      .Blood Blood-Peripheral  10-22-22   No Growth Final  --  --      Clean Catch Clean Catch (Midstream)  10-22-22   >100,000 CFU/ml Escherichia coli ESBL  --  Escherichia coli ESBL    Rapid RVP Result: NotDetec (10-25 @ 17:38)  CMVPCR Log: NotDetec [id number]/mL (10-25 @ 07:20)    RADIOLOGY:    < from: CT Chest w/ IV Cont (10.25.22 @ 17:48) >  IMPRESSION:    Interval decrease in size of left upper lobe/paramediastinal mass and   adjacent metastatic disease.    Interval development of new ground glass opacities in the left upper   lobe, suggestive of infection in the setting of recent chemotherapy.      < end of copied text >

## 2022-11-01 ENCOUNTER — TRANSCRIPTION ENCOUNTER (OUTPATIENT)
Age: 57
End: 2022-11-01

## 2022-11-01 VITALS
RESPIRATION RATE: 18 BRPM | DIASTOLIC BLOOD PRESSURE: 65 MMHG | SYSTOLIC BLOOD PRESSURE: 120 MMHG | HEART RATE: 85 BPM | TEMPERATURE: 98 F | OXYGEN SATURATION: 100 %

## 2022-11-01 LAB
ANION GAP SERPL CALC-SCNC: 11 MMOL/L — SIGNIFICANT CHANGE UP (ref 7–14)
BUN SERPL-MCNC: 4 MG/DL — LOW (ref 7–23)
CALCIUM SERPL-MCNC: 9.1 MG/DL — SIGNIFICANT CHANGE UP (ref 8.4–10.5)
CHLORIDE SERPL-SCNC: 103 MMOL/L — SIGNIFICANT CHANGE UP (ref 98–107)
CO2 SERPL-SCNC: 25 MMOL/L — SIGNIFICANT CHANGE UP (ref 22–31)
CREAT SERPL-MCNC: 0.53 MG/DL — SIGNIFICANT CHANGE UP (ref 0.5–1.3)
EGFR: 108 ML/MIN/1.73M2 — SIGNIFICANT CHANGE UP
GLUCOSE BLDC GLUCOMTR-MCNC: 138 MG/DL — HIGH (ref 70–99)
GLUCOSE BLDC GLUCOMTR-MCNC: 198 MG/DL — HIGH (ref 70–99)
GLUCOSE SERPL-MCNC: 113 MG/DL — HIGH (ref 70–99)
HCT VFR BLD CALC: 29.8 % — LOW (ref 34.5–45)
HGB BLD-MCNC: 9.6 G/DL — LOW (ref 11.5–15.5)
MAGNESIUM SERPL-MCNC: 1.9 MG/DL — SIGNIFICANT CHANGE UP (ref 1.6–2.6)
MCHC RBC-ENTMCNC: 24.4 PG — LOW (ref 27–34)
MCHC RBC-ENTMCNC: 32.2 GM/DL — SIGNIFICANT CHANGE UP (ref 32–36)
MCV RBC AUTO: 75.6 FL — LOW (ref 80–100)
NRBC # BLD: 0 /100 WBCS — SIGNIFICANT CHANGE UP (ref 0–0)
NRBC # FLD: 0 K/UL — SIGNIFICANT CHANGE UP (ref 0–0)
PHOSPHATE SERPL-MCNC: 3 MG/DL — SIGNIFICANT CHANGE UP (ref 2.5–4.5)
PLATELET # BLD AUTO: 274 K/UL — SIGNIFICANT CHANGE UP (ref 150–400)
POTASSIUM SERPL-MCNC: 3.8 MMOL/L — SIGNIFICANT CHANGE UP (ref 3.5–5.3)
POTASSIUM SERPL-SCNC: 3.8 MMOL/L — SIGNIFICANT CHANGE UP (ref 3.5–5.3)
RBC # BLD: 3.94 M/UL — SIGNIFICANT CHANGE UP (ref 3.8–5.2)
RBC # FLD: 20.2 % — HIGH (ref 10.3–14.5)
SODIUM SERPL-SCNC: 139 MMOL/L — SIGNIFICANT CHANGE UP (ref 135–145)
WBC # BLD: 6.75 K/UL — SIGNIFICANT CHANGE UP (ref 3.8–10.5)
WBC # FLD AUTO: 6.75 K/UL — SIGNIFICANT CHANGE UP (ref 3.8–10.5)

## 2022-11-01 PROCEDURE — 99239 HOSP IP/OBS DSCHRG MGMT >30: CPT

## 2022-11-01 RX ORDER — SUCRALFATE 1 G
1 TABLET ORAL
Qty: 60 | Refills: 0
Start: 2022-11-01 | End: 2022-11-30

## 2022-11-01 RX ORDER — ATORVASTATIN CALCIUM 80 MG/1
1 TABLET, FILM COATED ORAL
Qty: 0 | Refills: 0 | DISCHARGE

## 2022-11-01 RX ORDER — PANTOPRAZOLE SODIUM 20 MG/1
1 TABLET, DELAYED RELEASE ORAL
Qty: 30 | Refills: 0
Start: 2022-11-01 | End: 2022-11-30

## 2022-11-01 RX ORDER — FLUCONAZOLE 150 MG/1
1 TABLET ORAL
Qty: 8 | Refills: 0
Start: 2022-11-01 | End: 2022-11-08

## 2022-11-01 RX ADMIN — Medication 1 GRAM(S): at 05:22

## 2022-11-01 RX ADMIN — ENOXAPARIN SODIUM 40 MILLIGRAM(S): 100 INJECTION SUBCUTANEOUS at 12:19

## 2022-11-01 RX ADMIN — ONDANSETRON 4 MILLIGRAM(S): 8 TABLET, FILM COATED ORAL at 05:23

## 2022-11-01 RX ADMIN — PANTOPRAZOLE SODIUM 40 MILLIGRAM(S): 20 TABLET, DELAYED RELEASE ORAL at 05:26

## 2022-11-01 RX ADMIN — FLUCONAZOLE 100 MILLIGRAM(S): 150 TABLET ORAL at 01:59

## 2022-11-01 RX ADMIN — POLYETHYLENE GLYCOL 3350 17 GRAM(S): 17 POWDER, FOR SOLUTION ORAL at 12:19

## 2022-11-01 RX ADMIN — Medication 1: at 12:17

## 2022-11-01 RX ADMIN — Medication 1 TABLET(S): at 12:18

## 2022-11-01 RX ADMIN — Medication 5000 UNIT(S): at 12:19

## 2022-11-01 RX ADMIN — GABAPENTIN 300 MILLIGRAM(S): 400 CAPSULE ORAL at 05:22

## 2022-11-01 NOTE — PROGRESS NOTE ADULT - PROBLEM SELECTOR PROBLEM 3
Radiation gastritis
Radiation colitis
Radiation gastritis

## 2022-11-01 NOTE — PROGRESS NOTE ADULT - PROBLEM SELECTOR PLAN 6
- on Januvia 10mg and metformin 500mg BID  - FIDE with FS qmeals and qhs
- c/w atorvastatin 10mg qd
- on Januvia 10mg and metformin 500mg BID  - FIDE with FS qmeals and qhs
- on Januvia 10mg and metformin 500mg BID  - FIDE with FS qmeals and qhs

## 2022-11-01 NOTE — PROGRESS NOTE ADULT - PROBLEM SELECTOR PLAN 7
- c/w atorvastatin 10mg qd
DVT ppx: Lovenox 40mg   Diet: Consistent Carb   Dispo: pending resolution of sxs
- c/w atorvastatin 10mg qd

## 2022-11-01 NOTE — PROGRESS NOTE ADULT - PROBLEM SELECTOR PLAN 1
Sepsis 2/2 UTI. UA positive. Febrile to 102.8 yesterday. No leukocytosis. Pt. is immunocompromised in the setting of malignancy, chemo and radiation. CXR showing a YUNI consolidation. CT chest performed showing a post-obstructive PNA. Broadened antibiotics. Remains afebrile.   - UCx with E.coli resistant to cephalosporins.  - F/u BCx from 10/25 - NGTD   - MRSA swab negative - d/c vanc  - C/w Zosyn - last day is 10/30 ( today)    #Post obstructive PNA   - CT chest with YUNI PNA. Respiratory status stable. Not requiring O2. ID consulted. Initially broadened to vanc/zosyn. D/c'ed vanc. Zosyn (last day was 10/30). Improving. Discussed with Dr. Collins, flucanozole for 2 weeks.

## 2022-11-01 NOTE — PROGRESS NOTE ADULT - PROBLEM SELECTOR PROBLEM 4
Radiation colitis
Radiation colitis
Ampullary carcinoma
Radiation colitis

## 2022-11-01 NOTE — PROGRESS NOTE ADULT - PROBLEM SELECTOR PLAN 5
- diagnosed in 2018, s/p Whipple procedure in 2019  - recent LN FNA in 7/2022 positive for metastatic disease, currently on biweekly chemotherapy Gemzar and Ambraxane; last session on 10/11   - completed 15 sessions of radiation on 10/14  - Follows with at [facility]  - Oncology following.
- diagnosed in 2018, s/p Whipple procedure in 2019  - recent LN FNA in 7/2022 positive for metastatic disease, currently on biweekly chemotherapy Gemzar and Ambraxane; last session on 10/11   - completed 15 sessions of radiation on 10/14  - Follows with  At [facility]  - Pending oncology evaluation
- on Januvia 10mg and metformin 500mg BID  - FIDE with FS qmeals and qhs
- diagnosed in 2018, s/p Whipple procedure in 2019  - recent LN FNA in 7/2022 positive for metastatic disease, currently on biweekly chemotherapy Gemzar and Ambraxane; last session on 10/11   - completed 15 sessions of radiation on 10/14  - Follows with  At [facility]  - Pending oncology evaluation
- diagnosed in 2018, s/p Whipple procedure in 2019  - recent LN FNA in 7/2022 positive for metastatic disease, currently on biweekly chemotherapy Gemzar and Ambraxane; last session on 10/11   - completed 15 sessions of radiation on 10/14  - Follows with at [facility]  - Oncology following.
- diagnosed in 2018, s/p Whipple procedure in 2019  - recent LN FNA in 7/2022 positive for metastatic disease, currently on biweekly chemotherapy Gemzar and Ambraxane; last session on 10/11   - completed 15 sessions of radiation on 10/14  - Follows with  At [facility]  - Oncology following.
- diagnosed in 2018, s/p Whipple procedure in 2019  - recent LN FNA in 7/2022 positive for metastatic disease, currently on biweekly chemotherapy Gemzar and Ambraxane; last session on 10/11   - completed 15 sessions of radiation on 10/14  - Follows with  At [facility]  - Pending oncology evaluation
- diagnosed in 2018, s/p Whipple procedure in 2019  - recent LN FNA in 7/2022 positive for metastatic disease, currently on biweekly chemotherapy Gemzar and Ambraxane; last session on 10/11   - completed 15 sessions of radiation on 10/14  - Follows with  At [facility]  - Pending oncology evaluation
- diagnosed in 2018, s/p Whipple procedure in 2019  - recent LN FNA in 7/2022 positive for metastatic disease, currently on biweekly chemotherapy Gemzar and Ambraxane; last session on 10/11   - completed 15 sessions of radiation on 10/14  - Follows with  At [facility]  - Oncology following.
- diagnosed in 2018, s/p Whipple procedure in 2019  - recent LN FNA in 7/2022 positive for metastatic disease, currently on biweekly chemotherapy Gemzar and Ambraxane; last session on 10/11   - completed 15 sessions of radiation on 10/14  - Follows with  At [facility]  - Pending oncology evaluation

## 2022-11-01 NOTE — DISCHARGE NOTE PROVIDER - NSDCHHENCOUNTER_GEN_ALL_CORE
Estimated Creatinine Clearance: 42.6 mL/min (A) (by C-G formula based on SCr of 0.99 mg/dL (H)).    
Orders entered  
Patient contacted and reminded of upcoming appointment on Tuesday, 12/7/21.  Patient instructed to stay well hydrated on Monday and to drink 2 glasses of water one hour prior to appointment on Tuesday.  Patient also instructed  Infusion can cause body aches similar to those experienced with flu.  Advised patient to take 2 Tylenol or 2 Advil one hour prior to appointment to help lessen side effects with 2 glasses of water.  Questioned on recent or up coming dental work as need to post pone for one month before or after. Patient verbalized understanding of instructions.      
Reclast injection #5 scheduled for 12/07/21 at 10:00.  Lab scheduled for 11/30/21 at 10:00.  Dx M81.0.  Please create orders.    
01-Nov-2022

## 2022-11-01 NOTE — PROGRESS NOTE ADULT - NUTRITIONAL ASSESSMENT
This patient has been assessed with a concern for Malnutrition and has been determined to have a diagnosis/diagnoses of Severe protein-calorie malnutrition.    This patient is being managed with:   Diet Soft and Bite Sized-  Halal  Entered: Oct 26 2022  1:12PM    
This patient has been assessed with a concern for Malnutrition and has been determined to have a diagnosis/diagnoses of Severe protein-calorie malnutrition.    This patient is being managed with:   Diet Regular-  Halal  Entered: Oct 29 2022  1:27PM    
This patient has been assessed with a concern for Malnutrition and has been determined to have a diagnosis/diagnoses of Severe protein-calorie malnutrition.    This patient is being managed with:   Diet Soft and Bite Sized-  Halal  Entered: Oct 26 2022  1:12PM    
This patient has been assessed with a concern for Malnutrition and has been determined to have a diagnosis/diagnoses of Severe protein-calorie malnutrition.    This patient is being managed with:   Diet Regular-  Halal  Entered: Oct 29 2022  1:27PM    

## 2022-11-01 NOTE — PROGRESS NOTE ADULT - ASSESSMENT
57 year old female with metastatic ampullary carcinoma s/p Whipple in 2019, DM2, HTN presenting with abd pain, fever, chills. CT A/P with no acute findings, CT chest with interval decrease in size of left upper lobe/paramediastinal mass and adjacent metastatic disease and interval development of new ground glass opacities in the left upper lobe, suggestive of infection in the setting of recent chemotherapy. Also endorsing odynophagia now started on fluconazole. CMV serum PCR negative.     Recommend:  #Sepsis (fever, tachycardia) secondary to YUNI PNA  -Completed 7 days of zosyn for likely post obstructive pna 10/30/22  -MRSA nasal swab negative  -Blood cultures with no growth  -Monitor fever curve    #Odynophagia  -improving  -Continue fluconazole, can transition to oral to complete 14 days    #Asymptomatic bacteriuria  -Continue to monitor for urinary symptoms    Feliciano Collins MD  Available through MS Teams  If no response, or after 5pm/weekends, call [phone]
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 pHTN (dx on imaging 7/21/22), ventral hernia repair cb left inferior epigastric pseudoaneurysm, T2DM a1c 7.4%, HTN presenting with fever and chills found to have ESBL E coli UTI with GI consulted for odynophagia/dysphagia differential including radiation esophagitis versus infectious esophagitis including CMV, HSV, Candida.    Recommendations   -c/w w/ empiric course of fluconazole IV for candida esophagitis given improvement - patient prefers IV while inpatient after discussion this AM, can switch to PO on d/c  - stop statin while on fluconazole  - diet as tolerated   - no further GI w/u indicated at this time, please call back with questions      Note not finalized until signed by attending.    Leatha Carter PGY-6  Gastroenterology/Hepatology Fellow  Pager #[phone] (MANINDER) or [phone] (NS)  Available on [employer] Teams.  Please contact on-call GI fellow via answering service ([phone]) after 5pm and before 8am, and on weekends.               
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 pHTN (dx on imaging 7/21/22), ventral hernia repair cb left inferior epigastric pseudoaneurysm, T2DM a1c 7.4%, HTN presenting with fever and chills found to have ESBL E coli UTI with GI consulted for odynophagia/dysphagia differential including radiation esophagitis versus infectious esophagitis including CMV, HSV, Candida.    Recommendations   -c/w w/ empiric course of fluconazole IV for candida esophagitis given improvement, can switch to PO if continued improvement in dysphagia  - stop statin while on fluconazole  - diet as tolerated   - if recurrent dysphagia, can re-eval for endoscopic evaluation      Note not finalized until signed by attending.    Leatha Carter PGY-6  Gastroenterology/Hepatology Fellow  Pager #[phone] (MANINDER) or [phone] (NS)  Available on Microsoft Teams.  Please contact on-call GI fellow via answering service ([phone]) after 5pm and before 8am, and on weekends.               
Ms. Lagunas is a 58 y/o F with pmhx of metastatic ampullary carcinoma s/p Whipple in 2019, Type 2 DM, HTN who presents for 14 days of abdominal pain with associated fevers and chills. CT imaging consistent with gastritis and colitis likely 2/2 to radiation treatment. Currently being treated empirically
57 year old female with metastatic ampullary carcinoma s/p Whipple in 2019, DM2, HTN presenting with abd pain, fever, chills. CT A/P with no acute findings, CT chest with interval decrease in size of left upper lobe/paramediastinal mass and adjacent metastatic disease and interval development of new ground glass opacities in the left upper lobe, suggestive of infection in the setting of recent chemotherapy. Also endorsing odynophagia now started on fluconazole. CMV serum PCR negative.     Recommend:  #Sepsis (fever, tachycardia) secondary to YUNI PNA  -Continue zosyn for likely post obstructive pna to complete 7 days on 10/30/22  -MRSA nasal swab negative. Can dc vanco. Also no GPC clusters in sputum cx.  -F/U sputum cx to final - so far normal resp stew  -F/U blood cultures - remains no growth  -Monitor fever curve    #Odynophagia  -improving  -Continue fluconazole    Feliciano Collins MD  Available through MS Teams  If no response, or after 5pm/weekends, call [phone]
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 pHTN (dx on imaging 7/21/22), ventral hernia repair cb left inferior epigastric pseudoaneurysm, T2DM a1c 7.4%, HTN presenting with fever and chills found to have ESBL E coli UTI with GI consulted for odynophagia/dysphagia differential including radiation esophagitis versus infectious esophagitis including CMV, HSV, Candida.    Recommendations   -would start empiric fluconazole IV for candida esophagitis given small white plaque seen on exam  - stop statin  - if no improvement with treatment, will evaluate with EGD once optimized from infectious standpoint  - diet as tolerated for now      Note not finalized until signed by attending.    Leatha Carter PGY-6  Gastroenterology/Hepatology Fellow  Pager #[phone] (MANINDER) or [phone] (NS)  Available on Microsoft Teams.  Please contact on-call GI fellow via answering service ([phone]) after 5pm and before 8am, and on weekends.               
58 y/o F with PMH of metastatic ampullary carcinoma s/p Whipple in 2019, Type 2 DM, HTN who presented abdominal pain with associated fevers and chills. CT imaging interval decrease in size of left upper lobe/paramediastinal mass and adjacent metastatic disease but interval development of new ground glass opacities in the left upper lobe, likely infection in the setting of recent chemotherapy. Continuing to endorse odynophagia now started on fluconazole. CMV serum PCR negative.     Recommendations:   #Sepsis secondary to YUNI PNA  - Consider discontinue vancomycin since MRSA nasal swab negative  - Continue Zoysn  -F/U sputum cx  -F/U blood cultures; so far NGTD  -Monitor fever and WBC curve    #Odynophagia  -Continue fluconazole 200mg IV daily  -Monitor for improvement      THIS IS NOT A FINAL NOTE. PLEASE AWAIT ATTENDING ATTESTATION.    Mitzy Wallace MD  PGY-3 FM Resident          
Ms. Lagunas is a 56 y/o F with pmhx of metastatic ampullary carcinoma s/p Whipple in 2019, Type 2 DM, HTN who presents for 14 days of abdominal pain with associated fevers and chills. CT imaging consistent with gastritis and colitis likely 2/2 to radiation treatment. Currently being treated empirically
Ms. Lagunas is a 58 y/o F with pmhx of metastatic ampullary carcinoma s/p Whipple in 2019, Type 2 DM, HTN who presents for 14 days of abdominal pain with associated fevers and chills. CT imaging consistent with gastritis and colitis likely 2/2 to radiation treatment. Currently being treated empirically
Ms. Lagunas is a 56 y/o F with pmhx of metastatic ampullary carcinoma s/p Whipple in 2019, Type 2 DM, HTN who presents for 14 days of abdominal pain with associated fevers and chills. CT imaging consistent with gastritis and colitis likely 2/2 to radiation treatment. Currently being treated empirically
Ms. Lagunas is a 58 y/o F with pmhx of metastatic ampullary carcinoma s/p Whipple in 2019, Type 2 DM, HTN who presents for 14 days of abdominal pain with associated fevers and chills. CT imaging consistent with gastritis and colitis likely 2/2 to radiation treatment. Currently being treated empirically
Ms. Lagunas is a 58 y/o F with pmhx of metastatic ampullary carcinoma s/p Whipple in 2019, Type 2 DM, HTN who presents for 14 days of abdominal pain with associated fevers and chills. CT imaging consistent with gastritis and colitis likely 2/2 to radiation treatment. Currently being treated empirically
Ms. Lagunas is a 56 y/o F with pmhx of metastatic ampullary carcinoma s/p Whipple in 2019, Type 2 DM, HTN who presents for 14 days of abdominal pain with associated fevers and chills. CT imaging consistent with gastritis and colitis likely 2/2 to radiation treatment. Currently being treated empirically
Ms. Lagunas is a 56 y/o F with pmhx of metastatic ampullary carcinoma s/p Whipple in 2019, Type 2 DM, HTN who presents for 14 days of abdominal pain with associated fevers and chills. CT imaging consistent with gastritis and colitis likely 2/2 to radiation treatment. Currently being treated empirically

## 2022-11-01 NOTE — DISCHARGE NOTE PROVIDER - NSDCHHNEEDSERVICE_GEN_ALL_CORE
Medication teaching and assessment/Observation and assessment Medication teaching and assessment/Observation and assessment/Rehabilitation services/Teaching and training

## 2022-11-01 NOTE — PROGRESS NOTE ADULT - PROBLEM SELECTOR PROBLEM 7
Hyperlipidemia
Hyperlipidemia
Need for prophylactic measure
Hyperlipidemia

## 2022-11-01 NOTE — DISCHARGE NOTE PROVIDER - NSDCMRMEDTOKEN_GEN_ALL_CORE_FT
acetaminophen 325 mg oral tablet: 3 tab(s) orally every 6 hours, As Needed Pain  atorvastatin 10 mg oral tablet: 1 tab(s) orally once a day  Colace 2-in-1 50 mg-8.6 mg oral tablet: 3 tab(s) orally once a day (at bedtime)  Dulcolax Laxative 5 mg oral delayed release tablet: 1 tab(s) orally two times a day, As Needed constipation. This is an over the counter medication  gabapentin 300 mg oral capsule: orally 2 times a day  Januvia 100 mg oral tablet: 1 tab(s) orally once a day  metFORMIN 500 mg oral tablet: 1 tab(s) orally 2 times a day  MiraLax oral powder for reconstitution: 17 gram(s) orally once a day. Hold for losoe stool. This is an over the counter medication for constipation  montelukast 10 mg oral tablet: 1 tab(s) orally once a day (at bedtime)  Multiple Vitamins oral capsule: 1 cap(s) orally once a day  Vitamin D3 125 mcg (5000 intl units) oral capsule: 1 cap(s) orally once a day   acetaminophen 325 mg oral tablet: 3 tab(s) orally every 6 hours, As Needed Pain  benzonatate 100 mg oral capsule: 1 cap(s) orally 3 times a day, As needed, Cough  Colace 2-in-1 50 mg-8.6 mg oral tablet: 3 tab(s) orally once a day (at bedtime)  Diflucan 200 mg oral tablet: 1 tab(s) orally once a day x 8 days * do not take atorvastatin while on this medication  Dulcolax Laxative 5 mg oral delayed release tablet: 1 tab(s) orally two times a day, As Needed constipation. This is an over the counter medication  gabapentin 300 mg oral capsule: orally 2 times a day  Januvia 100 mg oral tablet: 1 tab(s) orally once a day  metFORMIN 500 mg oral tablet: 1 tab(s) orally 2 times a day  MiraLax oral powder for reconstitution: 17 gram(s) orally once a day. Hold for losoe stool. This is an over the counter medication for constipation  montelukast 10 mg oral tablet: 1 tab(s) orally once a day (at bedtime)  Multiple Vitamins oral capsule: 1 cap(s) orally once a day  pantoprazole 40 mg oral delayed release tablet: 1 tab(s) orally once a day (before a meal) before breakfast   sucralfate 1 g oral tablet: 1 tab(s) orally 2 times a day  Vitamin D3 125 mcg (5000 intl units) oral capsule: 1 cap(s) orally once a day

## 2022-11-01 NOTE — PROGRESS NOTE ADULT - PROBLEM SELECTOR PROBLEM 6
Type 2 diabetes mellitus
Hyperlipidemia
Type 2 diabetes mellitus

## 2022-11-01 NOTE — DISCHARGE NOTE PROVIDER - NSDCFUSCHEDAPPT_GEN_ALL_CORE_FT
[facility]  [facility]  Scheduled Appointment: 11/02/2022    [facility]  [facility]  Scheduled Appointment: 11/08/2022    Boris Lee  [address]  Scheduled Appointment: 11/17/2022    [facility]  Scheduled Appointment: 11/22/2022    [facility]  Scheduled Appointment: 12/06/2022

## 2022-11-01 NOTE — DISCHARGE NOTE PROVIDER - HOSPITAL COURSE
Ms. Lagunas is a 58 y/o F with pmhx of metastatic ampullary carcinoma s/p Whipple in 2019, Type 2 DM, HTN who presents for 14 days of abdominal pain with associated fevers and chills. CT imaging consistent with gastritis and colitis likely 2/2 to radiation treatment. Currently being treated empirically    Sepsis 2/2 UTI & PNA  UA positive. Febrile to 102.8. No leukocytosis. Pt. is immunocompromised in the setting of malignancy, chemo and radiation. CXR showing a YUNI consolidation. CT chest performed showing a post-obstructive PNA. Broadened antibiotics. Remains afebrile.   - UCx with E.coli resistant to cephalosporins.  - F/u BCx from 10/25 - no growth  - MRSA swab negative - d/c vanc  - CT chest with YUNI PNA. Respiratory status stable. Not requiring O2. ID consulted.   - s/p IV Zosyn x 7 days    Odynophagia.   - Odynophagia with dysphagia to solids and liquids 2/2 pain. 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   - GI consulted   - S&S consult - not aspirating  - Soft diet for now in the setting of pain. Counseled family on low acidity foods for now.  - C/w Fluconazole x 14 day treatment   - Hold statin while on fluconazole (on atorvastatin 10mgqd at home).   - with new imaging concerning for superimposed infection with lung mass s/p radiation, would avoid anesthesia and EGD until this improves  - outpt follow up with GI    Radiation gastritis.   - Possible radiation gastritis. Also considering infectious etiologies for pain. Plan as stated above.     Acute on chronic anemia  - No evidence of GIB. Baseline Hgb around 9 based on review of prior labs. She received 2L IVF on admission, likely dilutional component driving down hgb.    Radiation colitis.   - Possible radiation colitis  - GI consulted, defer scope for now    Ampullary carcinoma.   - diagnosed in 2018, s/p Whipple procedure in 2019  - recent LN FNA in 7/2022 positive for metastatic disease, currently on biweekly chemotherapy Gemzar and Ambraxane; last session on 10/11   - completed 15 sessions of radiation on 10/14  - Follows with  At [facility]  - Oncology following.    Type 2 diabetes mellitus.   - on Januvia 10mg and metformin 500mg BID  - FIDE with FS qmeals and qhs.    Hyperlipidemia.   - c/w atorvastatin 10mg qd - Hold while on fluconazole     Case discussed with Dr. Ramos on 11/1/22. Reviewed discharge medications with patient; All new medications requiring new prescription sent to pharmacy of patients choice. Reviewed need for prescription for previous home medication and new prescriptions sent if requested. Patient in agreement and understands.     Ms. Lagunas is a 56 y/o F with pmhx of metastatic ampullary carcinoma s/p Whipple in 2019, Type 2 DM, HTN who presents for 14 days of abdominal pain with associated fevers and chills. CT imaging consistent with gastritis and colitis likely 2/2 to radiation treatment. Currently being treated empirically    Sepsis 2/2 UTI & PNA  -UA positive. Febrile to 102.8. No leukocytosis. Pt. is immunocompromised in the setting of malignancy, chemo and radiation. CXR showing a YUNI consolidation. CT chest performed showing a post-obstructive PNA. Broadened antibiotics. Remains afebrile.   - UCx with E.coli resistant to cephalosporins.  - F/u BCx from 10/25 - no growth  - MRSA swab negative - d/c vanc  - CT chest with YUNI PNA. Respiratory status stable. Not requiring O2. ID consulted.   - s/p IV Zosyn x 7 days    Odynophagia.   - Odynophagia with dysphagia to solids and liquids 2/2 pain. 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   - GI consulted   - S&S consult - not aspirating  - Soft diet for now in the setting of pain. Counseled family on low acidity foods for now.  - C/w Fluconazole x 14 day treatment   - Hold statin while on fluconazole (on atorvastatin 10mgqd at home).   - with new imaging concerning for superimposed infection with lung mass s/p radiation, would avoid anesthesia and EGD until this improves  - outpt follow up with GI    Radiation gastritis.   - Possible radiation gastritis. Also considering infectious etiologies for pain. Plan as stated above.     Acute on chronic anemia  - No evidence of GIB. Baseline Hgb around 9 based on review of prior labs. She received 2L IVF on admission, likely dilutional component driving down hgb.    Radiation colitis.   - Possible radiation colitis  - GI consulted, defer scope for now    Ampullary carcinoma.   - diagnosed in 2018, s/p Whipple procedure in 2019  - recent LN FNA in 7/2022 positive for metastatic disease, currently on biweekly chemotherapy Gemzar and Ambraxane; last session on 10/11   - completed 15 sessions of radiation on 10/14  - Follows with  At [facility]  - Oncology following.    Type 2 diabetes mellitus.   - on Januvia 10mg and metformin 500mg BID  - FIDE with FS qmeals and qhs.    Hyperlipidemia.   - c/w atorvastatin 10mg qd - Hold while on fluconazole     Case discussed with Dr. Ramos on 11/1/22. Reviewed discharge medications with patient; All new medications requiring new prescription sent to pharmacy of patients choice. Reviewed need for prescription for previous home medication and new prescriptions sent if requested. Patient in agreement and understands.     Ms. Lagunas is a 58 y/o F with pmhx of metastatic ampullary carcinoma s/p Whipple in 2019, Type 2 DM, HTN who presents for 14 days of abdominal pain with associated fevers and chills. CT imaging consistent with gastritis and colitis likely 2/2 to radiation treatment. Currently being treated empirically    Sepsis 2/2 UTI & PNA  - UA positive. Febrile to 102.8. No leukocytosis. Pt. is immunocompromised in the setting of malignancy, chemo and radiation. CXR showing a YUNI consolidation. CT chest performed showing a post-obstructive PNA. Broadened antibiotics. Remains afebrile.   - UCx with E.coli resistant to cephalosporins.  - F/u BCx from 10/25 - no growth  - MRSA swab negative - d/c vanc  - CT chest with YUNI PNA. Respiratory status stable. Not requiring O2. ID consulted.   - s/p IV Zosyn x 7 days    Odynophagia   - Odynophagia with dysphagia to solids and liquids 2/2 pain. Etiology likely 2/2 radiation esophagitis vs candida esophagitis (no oral lesions), will send w/u to rule out viral etiologies in the setting of an immunocompromised state (chemo/malignancy)  - GI consulted   - S&S consult - not aspirating  - Soft diet for now in the setting of pain. Counseled family on low acidity foods for now.  - C/w Fluconazole x 14 day treatment   - Hold statin while on fluconazole (on atorvastatin 10mgqd at home).   - with new imaging concerning for superimposed infection with lung mass s/p radiation, would avoid anesthesia and EGD until this improves  - outpt follow up with GI    Radiation gastritis/colitis.   - Possible radiation gastritis.  Plan as stated above, deferring scope for outpatient follow up    Acute on chronic anemia  - No evidence of GIB. Baseline Hgb around 9 based on review of prior labs. She received 2L IVF on admission, likely dilutional component driving down hgb.    Ampullary carcinoma.   - diagnosed in 2018, s/p Whipple procedure in 2019  - recent LN FNA in 7/2022 positive for metastatic disease, currently on biweekly chemotherapy Gemzar and Ambraxane; last session on 10/11   - completed 15 sessions of radiation on 10/14  - Follows with  At [facility]  - Oncology following.    Type 2 diabetes mellitus.   - on Januvia 10mg and metformin 500mg BID  - FIDE with FS qmeals and qhs.    Hyperlipidemia.   - c/w atorvastatin 10mg qd - Hold while on fluconazole     Case discussed with Dr. Ramos on 11/1/22. Reviewed discharge medications with patient; All new medications requiring new prescription sent to pharmacy of patients choice. Reviewed need for prescription for previous home medication and new prescriptions sent if requested. Patient in agreement and understands.

## 2022-11-01 NOTE — PROGRESS NOTE ADULT - PROBLEM SELECTOR PROBLEM 2
Odynophagia
Radiation gastritis
Odynophagia

## 2022-11-01 NOTE — PROGRESS NOTE ADULT - PROBLEM SELECTOR PROBLEM 5
Ampullary carcinoma
Type 2 diabetes mellitus
Ampullary carcinoma

## 2022-11-01 NOTE — PROGRESS NOTE ADULT - REASON FOR ADMISSION
fevers and chills

## 2022-11-01 NOTE — PROGRESS NOTE ADULT - PROBLEM SELECTOR PLAN 3
Probably related to RT
Possible radiation gastritis. Also considering infectious etiologies for pain. Plan as stated above.     # Acute on chronic anemia  No evidence of GIB. Baseline Hgb around 9 based on review of prior labs. She received 2L IVF on admission, likely dilutional component driving down hgb.  - Trend H/H  - Maintain active t/s
- CT A/P with notable gastritis   - patient has completed 15 sessions of radiation, last session on 10/14, inflammation likely from radiation  - also endorsing odynophagia with both solids and liquids, no notable thrush or inflammation in pharynx, concern for possible radiation esophagitis   - c/s sucralafate   - pantoprazole 40mg     # Acute on chronic anemia  No evidence of GIB. Baseline Hgb around 9 based on review of prior labs. She received 2L IVF on admission, likely dilutional component driving down hgb.  - F/u iron studies, hapto, b12, folate [ ]  - Trend H/H  - Maintain active t/s
- same as above
Possible radiation gastritis. Also considering infectious etiologies for pain. Plan as stated above.     # Acute on chronic anemia  No evidence of GIB. Baseline Hgb around 9 based on review of prior labs. She received 2L IVF on admission, likely dilutional component driving down hgb.  - Trend H/H  - Maintain active t/s

## 2022-11-01 NOTE — PROGRESS NOTE ADULT - PROVIDER SPECIALTY LIST ADULT
Hospitalist
Gastroenterology
Gastroenterology
Infectious Disease
Gastroenterology
Hospitalist

## 2022-11-01 NOTE — PROGRESS NOTE ADULT - PROBLEM SELECTOR PLAN 8
DVT ppx: Lovenox 40mg   Diet: Consistent Carb   Dispo: pending resolution of sxs    Discussed with daughter at bedside.
DVT ppx: Lovenox 40mg   Diet: Consistent Carb   Dispo: pending resolution of sxs
DVT ppx: Lovenox 40mg   Diet: Consistent Carb   Dispo: pending resolution of sxs    Discussed with daughter at bedside today, after discharge they will continue to follow up at [facility]. Given her advanced cancer, she will remain at high risk of readmission in the near future.
DVT ppx: Lovenox 40mg   Diet: Consistent Carb   Dispo: pending resolution of sxs    Discussed with daughter at bedside today
DVT ppx: Lovenox 40mg   Diet: Consistent Carb   Dispo: pending resolution of sxs    Discussed with daughter at bedside.
DVT ppx: Lovenox 40mg   Diet: Consistent Carb   Dispo: pending resolution of sxs
DVT ppx: Lovenox 40mg   Diet: Consistent Carb   Dispo: pending resolution of sxs    Discussed with son at bedside 10/30
DVT ppx: Lovenox 40mg   Diet: Consistent Carb   Dispo: pending resolution of sxs
DVT ppx: Lovenox 40mg   Diet: Consistent Carb   Dispo: pending resolution of sxs    Discussed with daughter at bedside.

## 2022-11-01 NOTE — DISCHARGE NOTE PROVIDER - NSDCCPCAREPLAN_GEN_ALL_CORE_FT
PRINCIPAL DISCHARGE DIAGNOSIS  Diagnosis: Sepsis  Assessment and Plan of Treatment: During your stay it was determined you have sepsis from a urinary tract infection and pneumonia. We consulted our infectious disease doctors and you were given antibiotics. You have symptomatically improved and are safe for discharge. Please follow up with your PCP in 1-2 weeks after discharge.      SECONDARY DISCHARGE DIAGNOSES  Diagnosis: Ampullary carcinoma  Assessment and Plan of Treatment: During your stay our oncology team was following. Please continue to follow up with your outpatient oncologist as regularly scheduled.    Diagnosis: Hyperlipidemia  Assessment and Plan of Treatment: ***please do not take your statin while you are on flucanazole as this can damage your liver  Continue a DASH (Low fat/salt) diet. Follow up with your primary care provider upon discharge for further management and monitoring of cholesterol levels.    Diagnosis: Odynophagia  Assessment and Plan of Treatment: During your stay our GI team and infectious disease team was consulted. We started you on an antifungal medication and your pain with swallowing has started to improve. Please continue to take your medications to complete a 14 day course. Follow up with GI outpatient.    Diagnosis: Radiation gastritis  Assessment and Plan of Treatment: During your stay we had our GI team following your case. It was determined you were not healthy enough for an EGD and should follow up outpatient for further imaging/management.

## 2022-11-01 NOTE — PROGRESS NOTE ADULT - PROBLEM SELECTOR PLAN 4
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
- diagnosed in 2018, s/p Whipple procedure in 2019  - recent LN FNA in 7/2022 positive for metastatic disease, currently on biweekly chemotherapy Gemzar and Ambraxane; last session on 10/11   - completed 15 sessions of radiation on 10/14  - Follows with  At [facility], will email Onc  - will start with IV morphine 2mg and 4mg for moderate and severe pain, can descalate as pain improves
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
- same as above
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

## 2022-11-01 NOTE — DISCHARGE NOTE PROVIDER - CARE PROVIDER_API CALL
Your PCP,   Phone: (   )    -  Fax: (   )    -  Follow Up Time:     Quin Hamilton)  Gastroenterology; Internal Medicine  [address], [address]  [place] [address]  Phone: [phone]  Fax: [phone]  Follow Up Time:

## 2022-11-10 ENCOUNTER — RESULT REVIEW (OUTPATIENT)
Age: 57
End: 2022-11-10

## 2022-11-10 ENCOUNTER — APPOINTMENT (OUTPATIENT)
Dept: HEMATOLOGY ONCOLOGY | Facility: CLINIC | Age: 57
End: 2022-11-10

## 2022-11-10 VITALS
RESPIRATION RATE: 16 BRPM | WEIGHT: 94.8 LBS | HEART RATE: 80 BPM | SYSTOLIC BLOOD PRESSURE: 107 MMHG | OXYGEN SATURATION: 97 % | DIASTOLIC BLOOD PRESSURE: 67 MMHG | BODY MASS INDEX: 18.51 KG/M2 | TEMPERATURE: 97.4 F

## 2022-11-10 LAB
BASOPHILS # BLD AUTO: 0.07 K/UL — SIGNIFICANT CHANGE UP (ref 0–0.2)
BASOPHILS NFR BLD AUTO: 1.3 % — SIGNIFICANT CHANGE UP (ref 0–2)
EOSINOPHIL # BLD AUTO: 0.4 K/UL — SIGNIFICANT CHANGE UP (ref 0–0.5)
EOSINOPHIL NFR BLD AUTO: 7.2 % — HIGH (ref 0–6)
HCT VFR BLD CALC: 29.5 % — LOW (ref 34.5–45)
HGB BLD-MCNC: 9.1 G/DL — LOW (ref 11.5–15.5)
IMM GRANULOCYTES NFR BLD AUTO: 0.5 % — SIGNIFICANT CHANGE UP (ref 0–0.9)
LYMPHOCYTES # BLD AUTO: 0.87 K/UL — LOW (ref 1–3.3)
LYMPHOCYTES # BLD AUTO: 15.8 % — SIGNIFICANT CHANGE UP (ref 13–44)
MCHC RBC-ENTMCNC: 24.6 PG — LOW (ref 27–34)
MCHC RBC-ENTMCNC: 30.8 G/DL — LOW (ref 32–36)
MCV RBC AUTO: 79.7 FL — LOW (ref 80–100)
MONOCYTES # BLD AUTO: 0.63 K/UL — SIGNIFICANT CHANGE UP (ref 0–0.9)
MONOCYTES NFR BLD AUTO: 11.4 % — SIGNIFICANT CHANGE UP (ref 2–14)
NEUTROPHILS # BLD AUTO: 3.52 K/UL — SIGNIFICANT CHANGE UP (ref 1.8–7.4)
NEUTROPHILS NFR BLD AUTO: 63.8 % — SIGNIFICANT CHANGE UP (ref 43–77)
NRBC # BLD: 0 /100 WBCS — SIGNIFICANT CHANGE UP (ref 0–0)
PLATELET # BLD AUTO: 150 K/UL — SIGNIFICANT CHANGE UP (ref 150–400)
RBC # BLD: 3.7 M/UL — LOW (ref 3.8–5.2)
RBC # FLD: 19.9 % — HIGH (ref 10.3–14.5)
WBC # BLD: 5.52 K/UL — SIGNIFICANT CHANGE UP (ref 3.8–10.5)
WBC # FLD AUTO: 5.52 K/UL — SIGNIFICANT CHANGE UP (ref 3.8–10.5)

## 2022-11-10 PROCEDURE — 99214 OFFICE O/P EST MOD 30 MIN: CPT

## 2022-11-10 NOTE — ASSESSMENT
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 Lee Oct 2022\par     - patient has had difficulty tolerating treatment. since she began gem/abraxane in Aug she has only received a        total of 5 treatments. Treatments have been interrupted secondary to neutropenia, physical condition and            hospitalization/infection. scans reviewed and stable, will give her a treatment break until/if she is stronger.      Daughter is amenable to this plan and will have TEB in 2 weeks to discuss. At that time will likely treat with      single agent gem\par     - reviewed CT C/A/P taken in hospital between Oct 22-25, 2022: no evidence of disease in abdomen and            Interval decrease in size of left upper lobe/paramediastinal mass and adjacent metastatic disease\par     - Continue supportive care (pain management, antiemetics, bowel regimen, hydration\par     PRN) - see below. \par     - Monitor for toxicity and physical activity as tolerated. \par     - All concerns and questions were addressed and answered in full detail to the patient and daughter's\par     apparent satisfaction and agreement.\par     - RTC in 2-3 weeks.\par \par #Chronic constipation \par  · -increase intake of fiber-rich foods and oral hydration. \par     - continue Dulcolax as directed (prescribed by her PCP, pt wishes to use this only at\par     this time).\par \par #Pain due to neoplasm \par  · - continue Oxycodone for pain relief to chest and back (disease related, no relief noted\par     with OTC Tylenol and gabapentin).\par \par #Peripheral neuropathy \par  · - abraxane dose lowered from last tx (previosuly 125 mg/2m, now 100 mg/m2).due to\par     chemotherapy-induced peripheral neuropathy (located on her fingertips and toes). If patient is ready to re-initiate chemo at that time will move towards single agent [place]\par     - mild improvement noted, continue to monitor.\par \par #Poor appetite \par  · - PO intake encouraged to maintain weight and advised to follow unrestricted caloric\par     intake from diet.\par     - continue Omeprazole daily to address abdominal discomfort/cramping. \par     - Palliative care f/u regarding medical cannabis recommendations. [Palliative] : Goals of care discussed with patient: Palliative [Palliative Care Plan] : not applicable at this time

## 2022-11-10 NOTE — HISTORY OF PRESENT ILLNESS
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 \par \par Initially was admitted to [facility] on 12/18/18 with dizziness, found to have elevated LFTs. MRCP demonstrated a dilatation of the CBD. ERCP on 12/22/18 performed c/w ampullary mass with obstruction. Pathology c/w adenocarcinoma. Pt was discharged for out pt follow up with surg onc and med onc but was re-admitted on 1/5/19 with n/v, worsening abdominal pain, found to have severe sepsis with E coli bacteremia 2/2 acute cholangitis. \par \par 1/5/19: CT A/P Heterogeneity of the right hepatic lobe with questionable 1.3 cm hypoattenuating lesion in segment 7.\par 1/7/19: ERCP performed. Stent was removed from the biliary tree. One plastic stent placed\par 1/8/19: MRI Abd: No suspicious liver lesions. No abnormality is identified to correspond with questionable lesion identified on prior CT. 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  Taken to the OR for evacuation of hematoma, cessation of gastroduodenal artery bleeding with two hemostatic stitches, and placement of abthera VAC.  Required pressors in the SICU, then developed fever. \par 1/21-1/22/19: OR exploratory laparotomy, drainage of abdominal abscess, repair of abdominal wall hernia with Strattice mesh, b/ abdominal wall advancement flaps. Post-op patient remained intubated and off vasopressors. FENA calculated to be pre-renal. 500 CC albumin bolus given, urine output improved. 1/22/19 attempted spontaneous breathing trials but pt hypoxic. Pt lactate uptrend, resuscitated w/IVF, and pt hypotensive requiring pressors. UCx growing candida, Diflucan changed to micafungin to r/o resistant organisms in the setting of worsening sepsis. \par 1/23/19: patient noted to have blood oozing from midline incision while hypotensive and on pressors. Peak pressures noted to be elevated on vent and abdomen increasingly distended. All staples removed and a large amount of clot evacuated from midline incision. 1U PRBC given. No active bleeding appreciated. Fascia remains closed. Wound packed with wet to dry dressing. Vitamin K given for elevated INR. \par 1/24-1/29/19: remained in SICU, started on TPN, diuresed, finally extubated. \par 2/1/19: septic shock again, pressors restarted\par 2/2-2/11/19: CT scan with peritonitis, new abscesses. Ab and Antifungals started. Wound with increase in drainage. Octreotide started for increased output \par 2/12/19: drainage of abscesses by IR.  \par 2/18/19: Wound was partially closed by plastics with interrupted sutures and an ostomy appliance was placed, VAC removed.\par 3/1/19. d/c home \par 4/30-10/21/19: s/p 8 cycles Gemzar/Xeloda chemotherapy\par 10/10/19: CT CAP: no evidence of mets\par 1/20-11/2020: prolonged stay in [facility] due to COVID \par 11/6/20: CT CAP: 2 mildly enlarged mesenteric LN, attention in f/u\par 5/19/21: CT CAP revealed stable exam, unchanged cardiophrenic and mesenteric root lymph nodes\par 12/7/21: CT CAP: stable disease\par 1/21/22: ventral hernia repair \par 3/10/22: CT abd/pel: significant interval improvement of previously described rectus/anterior abdominal wall hematoma, previously described focal hematoma at the left anterior abdominal wall is decreased in size\par 7/1/22: PET/CT: Masslike consolidation in the anterior left upper lung with intense uptake and left paratracheal lymph node, small focus of uptake in the right hepatic lobe near the severino hepatis suspicious for metastatic lesion.\par 7/19-7/22/22: [facility] admission s/p bronchoscopy 7/20/22; 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) \par 8/8-8/22/22: [id number] Gemzar/Abraxane\par 9/6/22: tx held (ANC 0.82)\par 9/13-9/27/22: [id number] Gemzar/Abraxane (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)\par 10/12/22: completed 15/15 fx to L Lung\par admitted to [facility] 10/22/22-11/1/22 for fever chills, sepsis with UTI and post obstructive pna  [de-identified] : adenocarcinoma [de-identified] : CA [id number] [de-identified] : FINAL PATHOLOGY:\par -Invasive adenocarcinoma of the duodenal ampulla (Tumor size: 2.5 x 1.5 x 0.8 cm); Tumor stage: pT3b pN1\par -Vascular and perineural invasion identified\par -1/24 lymph nodes involved by adenocarcinoma\par -Negative margins\par -S/p cholecystectomy ; acute cholecystitis with cholelithiasis \par -Bile duct margin with acute inflammation and focal atypia consistent with low grade dysplasia. \par  [Therapy: ___] : Therapy: [unfilled] [Cycle: ___] : Cycle: [unfilled] [Day: ___] : Day: [unfilled] [de-identified] : Patient evaluated in clinic today with her daughter providing translation per her request. Patient recently discharged from hospital, completed antibiotics yesterday and according to daughter has been very weak. Spends majority of time in bed, feels slightly better without chemo.

## 2022-11-10 NOTE — REVIEW OF SYSTEMS
[Fatigue] : fatigue [Recent Change In Weight] : ~T recent weight change [Cough] : cough [Abdominal Pain] : abdominal pain [Joint Pain] : joint pain [Anxiety] : anxiety [Negative] : Allergic/Immunologic [de-identified] : +peripheral neuropathy

## 2022-11-10 NOTE — PHYSICAL EXAM
[Capable of only limited self care, confined to bed or chair more than 50% of waking hours] : Status 3- Capable of only limited self care, confined to bed or chair more than 50% of waking hours [Normal] : affect appropriate [de-identified] : thin, appears older than stated age, needs to lay down during visit today, ambulating with cane

## 2022-11-11 LAB
ALBUMIN SERPL ELPH-MCNC: 3.6 G/DL
ALP BLD-CCNC: 163 U/L
ALT SERPL-CCNC: 23 U/L
ANION GAP SERPL CALC-SCNC: 15 MMOL/L
AST SERPL-CCNC: 52 U/L
BILIRUB SERPL-MCNC: 0.4 MG/DL
BUN SERPL-MCNC: 5 MG/DL
CALCIUM SERPL-MCNC: 9.9 MG/DL
CANCER AG19-9 SERPL-ACNC: 1363 U/ML
CHLORIDE SERPL-SCNC: 102 MMOL/L
CO2 SERPL-SCNC: 23 MMOL/L
CREAT SERPL-MCNC: 0.45 MG/DL
EGFR: 113 ML/MIN/1.73M2
GLUCOSE SERPL-MCNC: 136 MG/DL
POTASSIUM SERPL-SCNC: 4.1 MMOL/L
PROT SERPL-MCNC: 7.3 G/DL
SODIUM SERPL-SCNC: 140 MMOL/L

## 2022-11-14 NOTE — ED ADULT TRIAGE NOTE - HEIGHT IN INCHES
Call placed to patient to inform that labs are needed and the order is in. Informed of f/u appointment as well. Verbalized understanding.       SHABBIR Cruz    11/14/22   [id number]      ----- Message from Kendall Schaeffer MD sent at 11/14/2022 12:52 PM CST -----  Pls call pt to schedule labs in 1 week and f/u OV in 2 weeks.    [facility]     10

## 2022-11-15 ENCOUNTER — APPOINTMENT (OUTPATIENT)
Dept: INFUSION THERAPY | Facility: HOSPITAL | Age: 57
End: 2022-11-15

## 2022-11-15 NOTE — RESULTS/DATA
[FreeTextEntry1] : CT reviewed  SSKI Counseling:  I discussed with the patient the risks of SSKI including but not limited to thyroid abnormalities, metallic taste, GI upset, fever, headache, acne, arthralgias, paraesthesias, lymphadenopathy, easy bleeding, arrhythmias, and allergic reaction.

## 2022-11-17 ENCOUNTER — APPOINTMENT (OUTPATIENT)
Dept: RADIATION ONCOLOGY | Facility: CLINIC | Age: 57
End: 2022-11-17

## 2022-11-17 NOTE — DISEASE MANAGEMENT
[Clinical] : TNM Stage: c [IV] : IV [TTNM] : - [NTNM] : - [MTNM] : 1 [de-identified] : 14 fx/3900 cGy [de-identified] : 15 fx/4500  cGy [de-identified] : L lung

## 2022-11-17 NOTE — PHYSICAL EXAM
[Extraocular Movements] : extraocular movements were intact [Outer Ear] : the ears and nose were normal in appearance [] : no respiratory distress [Normal] : oriented to person, place and time, the affect was normal, the mood was normal and not anxious [de-identified] : in wheelchair here/ambulatory with cane at home

## 2022-11-17 NOTE — HISTORY OF PRESENT ILLNESS
[FreeTextEntry1] : 56 yo F with diagnosed stage III, pT3N1b ampullary CA, s/p Whipple pancreaticoduodenectomy and 8 cycles Gemzar/Xeloda, recently found to have new mets to YUNI and left paratracheal lymph node, presents for discussion of RT for oligometastases to lung and for pain control.  With new found oligometastases to lung/LN, we discussed adding local radiation therapy for local control and possibly to improve disease free survival. Given the location and LN involvement, I did not feel SBRT would be appropriate. Hence I recommended a moderately hypofractionated course of radiation to the left lung/LN/hilum totaling 45Gy/15fx.\par \par \par 12/8/22: Presents for post treatment evaluationt. Patient is s/p radiation to the left lung on 10/14/22 with total dose of 4500 cGy given.

## 2022-11-17 NOTE — HISTORY OF PRESENT ILLNESS
[FreeTextEntry1] : 56 yo F with diagnosed stage III, pT3N1b ampullary CA, s/p Whipple pancreaticoduodenectomy and 8 cycles Gemzar/Xeloda, recently found to have new mets to Y[place]I and left paratracheal lymph node, presents for discussion of RT for oligometastases to lung and for pain control.  With new found oligometastases to lung/LN, we discussed adding local radiation therapy for local control and possibly to improve disease free survival. Given the location and LN involvement, I did not feel SBRT would be appropriate. Hence I recommended a moderately hypofractionated course of radiation to the left lung/LN/hilum totaling 45Gy/15fx.\par \par 9/29/2022:  Patient is seen for OTV s/p 4/15 fx to L lung and continues on Gemzar/Abraxane.  She continues to take Oxycodone for whole body pain related to Gemzar.  Her appetite is liable and she continues to have baseline cough.  She has grade 3 CERDA at baseline. \par \par 10/6/2022: OTV 9/15 fractions.  She continues to have baseline dry cough, unchanged. She reports thick saliva in the past few days, with some sensation that it is difficult to swallow, without choking or regurgitation. Her weight is stable.  She denies throat pain.\par \par 10/11/2022:  Patient is seen for OTV s/p 14/15 fx to L lung. discharge packet given and followup appt. Having a lot of pain chest and back. Given lidocaine HCL 2 % with little relief.. Eating liquids. Pt weak and fatigued.

## 2022-11-21 NOTE — PROGRESS NOTE ADULT - NUTRITIONAL ASSESSMENT
This patient has been assessed with a concern for Malnutrition and has been determined to have a diagnosis/diagnoses of Severe protein-calorie malnutrition.    This patient is being managed with:   Diet Consistent Carbohydrate/No Snacks-  Halal  Supplement Feeding Modality:  Oral  Glucerna Shake Cans or Servings Per Day:  3       Frequency:  Daily  Entered: Jul 20 2022  8:10PM    
This patient has been assessed with a concern for Malnutrition and has been determined to have a diagnosis/diagnoses of Severe protein-calorie malnutrition.    This patient is being managed with:   Diet Consistent Carbohydrate/No Snacks-  Supplement Feeding Modality:  Oral  Glucerna Shake Cans or Servings Per Day:  3       Frequency:  Daily  Entered: Jul 20 2022  2:47PM    Diet Consistent Carbohydrate/No Snacks-  DASH/TLC {Sodium & Cholesterol Restricted} (DASH)  Halal  Entered: Jul 19 2022  2:06PM    Diet NPO after Midnight-     NPO Start Date: 19-Jul-2022   NPO Start Time: 23:59  Except Medications  Entered: Jul 19 2022  1:42PM    The following pending diet order is being considered for treatment of Severe protein-calorie malnutrition:null
This patient has been assessed with a concern for Malnutrition and has been determined to have a diagnosis/diagnoses of Severe protein-calorie malnutrition.    This patient is being managed with:   Diet Consistent Carbohydrate/No Snacks-  Supplement Feeding Modality:  Oral  Glucerna Shake Cans or Servings Per Day:  3       Frequency:  Daily  Entered: Jul 20 2022  2:47PM    Diet Consistent Carbohydrate/No Snacks-  DASH/TLC {Sodium & Cholesterol Restricted} (DASH)  Halal  Entered: Jul 19 2022  2:06PM    Diet NPO after Midnight-     NPO Start Date: 19-Jul-2022   NPO Start Time: 23:59  Except Medications  Entered: Jul 19 2022  1:42PM    The following pending diet order is being considered for treatment of Severe protein-calorie malnutrition:null
Unknown
This patient has been assessed with a concern for Malnutrition and has been determined to have a diagnosis/diagnoses of Severe protein-calorie malnutrition.    This patient is being managed with:   Diet Consistent Carbohydrate/No Snacks-  Halal  Supplement Feeding Modality:  Oral  Glucerna Shake Cans or Servings Per Day:  3       Frequency:  Daily  Entered: Jul 20 2022  8:10PM    
This patient has been assessed with a concern for Malnutrition and has been determined to have a diagnosis/diagnoses of Severe protein-calorie malnutrition.    This patient is being managed with:   Diet Consistent Carbohydrate/No Snacks-  Halal  Supplement Feeding Modality:  Oral  Glucerna Shake Cans or Servings Per Day:  3       Frequency:  Daily  Entered: Jul 20 2022  8:10PM

## 2022-11-24 NOTE — ASSESSMENT
[Palliative] : Goals of care discussed with patient: Palliative [Palliative Care Plan] : not applicable at this time 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/ stage III ampullary ca, pancreaticobilliary type s/p whipple with prolonged\par     hospital course 1/19-3/1/19 completed 8 cycles adjucant Gemzar/Xeloda in 10/19, now\par     on surveillance c/b chronic abdominal pain 2/2 large hernia s/p hernia repair\par     in Jan 2022 c/b hematoma, remained on surveillance --> s/p bronchoscopy via [facility]\par     admission 7/20/22 due to worsening cough; pathology confirmed stage IV metastatic\par     adenocarcinoma (favor pancreaticobiliary origin) --> last on Gemzar/Abraxane q 2\par     weeks as of 9/2022\par \par #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 Lee Oct 2022\par     - patient has had difficulty tolerating treatment. since she began gem/abraxane in Aug she has only received a        total of 5 treatments. Treatments have been interrupted secondary to neutropenia, physical condition and            hospitalization/infection. scans reviewed and stable, will give her a treatment break until/if she is stronger.      Daughter is amenable to this plan and will have TEB in 2 weeks to discuss. At that time will likely treat with      single agent gem\par     - reviewed CT C/A/P taken in hospital between Oct 22-25, 2022: no evidence of disease in abdomen and            Interval decrease in size of left upper lobe/paramediastinal mass and adjacent metastatic disease\par     - Continue supportive care (pain management, antiemetics, bowel regimen, hydration\par     PRN) - see below. \par     - Monitor for toxicity and physical activity as tolerated. \par     - All concerns and questions were addressed and answered in full detail to the patient and daughter's\par     apparent satisfaction and agreement.\par     - RTC in 2-3 weeks.\par \par #Chronic constipation \par  · -increase intake of fiber-rich foods and oral hydration. \par     - continue Dulcolax as directed (prescribed by her PCP, pt wishes to use this only at\par     this time).\par \par #Pain due to neoplasm \par  · - continue Oxycodone for pain relief to chest and back (disease related, no relief noted\par     with OTC Tylenol and gabapentin).\par \par #Peripheral neuropathy \par  · - abraxane dose lowered from last tx (previosuly 125 mg/2m, now 100 mg/m2).due to\par     chemotherapy-induced peripheral neuropathy (located on her fingertips and toes). If patient is ready to re-initiate chemo at that time will move towards single agent [place]\par     - mild improvement noted, continue to monitor.\par \par #Poor appetite \par  · - PO intake encouraged to maintain weight and advised to follow unrestricted caloric\par     intake from diet.\par     - continue Omeprazole daily to address abdominal discomfort/cramping. \par     - Palliative care f/u regarding medical cannabis recommendations.

## 2022-11-25 ENCOUNTER — APPOINTMENT (OUTPATIENT)
Dept: HEMATOLOGY ONCOLOGY | Facility: CLINIC | Age: 57
End: 2022-11-25

## 2022-11-25 PROCEDURE — 99213 OFFICE O/P EST LOW 20 MIN: CPT | Mod: 95

## 2022-11-25 NOTE — REVIEW OF SYSTEMS
[Fatigue] : fatigue [Recent Change In Weight] : ~T recent weight change [Cough] : cough [Abdominal Pain] : abdominal pain [Joint Pain] : joint pain [Anxiety] : anxiety [Negative] : Allergic/Immunologic [de-identified] : +peripheral neuropathy

## 2022-11-25 NOTE — PHYSICAL EXAM
[Ambulatory and capable of all self care but unable to carry out any work activities] : Status 2- Ambulatory and capable of all self care but unable to carry out any work activities. Up and about more than 50% of waking hours [Normal] : well developed, well nourished, in no acute distress [Thin] : thin 61

## 2022-11-25 NOTE — REASON FOR VISIT
[Home] : at home, [unfilled] , at the time of the visit. [Medical Office: ([facility])___] : at the medical office located in  [Other:____] : [unfilled] [Follow-Up Visit] : a follow-up [FreeTextEntry3] : daughter [FreeTextEntry2] : Stage IV ampullary carcinoma

## 2022-11-25 NOTE — HISTORY OF PRESENT ILLNESS
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 \par \par Initially was admitted to [facility] on 12/18/18 with dizziness, found to have elevated LFTs. MRCP demonstrated a dilatation of the CBD. ERCP on 12/22/18 performed c/w ampullary mass with obstruction. Pathology c/w adenocarcinoma. Pt was discharged for out pt follow up with surg onc and med onc but was re-admitted on 1/5/19 with n/v, worsening abdominal pain, found to have severe sepsis with E coli bacteremia 2/2 acute cholangitis. \par \par 1/5/19: CT A/P Heterogeneity of the right hepatic lobe with questionable 1.3 cm hypoattenuating lesion in segment 7.\par 1/7/19: ERCP performed. Stent was removed from the biliary tree. One plastic stent placed\par 1/8/19: MRI Abd: No suspicious liver lesions. No abnormality is identified to correspond with questionable lesion identified on prior CT. 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  Taken to the OR for evacuation of hematoma, cessation of gastroduodenal artery bleeding with two hemostatic stitches, and placement of abthera VAC.  Required pressors in the SICU, then developed fever. \par 1/21-1/22/19: OR exploratory laparotomy, drainage of abdominal abscess, repair of abdominal wall hernia with Strattice mesh, b/ abdominal wall advancement flaps. Post-op patient remained intubated and off vasopressors. FENA calculated to be pre-renal. 500 CC albumin bolus given, urine output improved. 1/22/19 attempted spontaneous breathing trials but pt hypoxic. Pt lactate uptrend, resuscitated w/IVF, and pt hypotensive requiring pressors. UCx growing candida, Diflucan changed to micafungin to r/o resistant organisms in the setting of worsening sepsis. \par 1/23/19: patient noted to have blood oozing from midline incision while hypotensive and on pressors. Peak pressures noted to be elevated on vent and abdomen increasingly distended. All staples removed and a large amount of clot evacuated from midline incision. 1U PRBC given. No active bleeding appreciated. Fascia remains closed. Wound packed with wet to dry dressing. Vitamin K given for elevated INR. \par 1/24-1/29/19: remained in SICU, started on TPN, diuresed, finally extubated. \par 2/1/19: septic shock again, pressors restarted\par 2/2-2/11/19: CT scan with peritonitis, new abscesses. Ab and Antifungals started. Wound with increase in drainage. Octreotide started for increased output \par 2/12/19: drainage of abscesses by IR.  \par 2/18/19: Wound was partially closed by plastics with interrupted sutures and an ostomy appliance was placed, VAC removed.\par 3/1/19. d/c home \par 4/30-10/21/19: s/p 8 cycles Gemzar/Xeloda chemotherapy\par 10/10/19: CT CAP: no evidence of mets\par 1/20-11/2020: prolonged stay in [facility] due to COVID \par 11/6/20: CT CAP: 2 mildly enlarged mesenteric LN, attention in f/u\par 5/19/21: CT CAP revealed stable exam, unchanged cardiophrenic and mesenteric root lymph nodes\par 12/7/21: CT CAP: stable disease\par 1/21/22: ventral hernia repair \par 3/10/22: CT abd/pel: significant interval improvement of previously described rectus/anterior abdominal wall hematoma, previously described focal hematoma at the left anterior abdominal wall is decreased in size\par 7/1/22: PET/CT: Masslike consolidation in the anterior left upper lung with intense uptake and left paratracheal lymph node, small focus of uptake in the right hepatic lobe near the severino hepatis suspicious for metastatic lesion.\par 7/19-7/22/22: [facility] admission s/p bronchoscopy 7/20/22; 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) \par 8/8-8/22/22: [id number] Gemzar/Abraxane\par 9/6/22: tx held (ANC 0.82)\par 9/13-9/27/22: [id number] Gemzar/Abraxane (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)\par 10/12/22: completed 15/15 fx to [facility]\par 10/22/22-11/1/22: J admission due to sepsis with UTI and post obstructive pna  [de-identified] : adenocarcinoma [de-identified] : [id number] [de-identified] : FINAL PATHOLOGY:\par -Invasive adenocarcinoma of the duodenal ampulla (Tumor size: 2.5 x 1.5 x 0.8 cm); Tumor stage: pT3b pN1\par -Vascular and perineural invasion identified\par -1/24 lymph nodes involved by adenocarcinoma\par -Negative margins\par -S/p cholecystectomy ; acute cholecystitis with cholelithiasis \par -Bile duct margin with acute inflammation and focal atypia consistent with low grade dysplasia. \par  [de-identified] : Patient has remained off chemotherapy since being discharged from [facility] earlier this month. Her daughter noted clinical improvement with her overall physical condition while resting at home. She is able to complete her self-care ADLs independently at this time with assistance from family if needed. She noted improvement with peripheral neuropathy and cough (uses Robitussin and Promethazine/Codeine cough syrup 10 ml q 4-6 hrs, it provides mild/temporary relief). Patient's daughter is making a conscious effort to reinforce her oral intake (stable weight noted today). Her bowel movements remain regular (normal in color, consistency, frequency).

## 2022-11-29 ENCOUNTER — RESULT REVIEW (OUTPATIENT)
Age: 57
End: 2022-11-29

## 2022-11-29 ENCOUNTER — APPOINTMENT (OUTPATIENT)
Dept: INFUSION THERAPY | Facility: HOSPITAL | Age: 57
End: 2022-11-29

## 2022-11-29 ENCOUNTER — APPOINTMENT (OUTPATIENT)
Dept: HEMATOLOGY ONCOLOGY | Facility: CLINIC | Age: 57
End: 2022-11-29

## 2022-11-29 ENCOUNTER — APPOINTMENT (OUTPATIENT)
Dept: ULTRASOUND IMAGING | Facility: IMAGING CENTER | Age: 57
End: 2022-11-29

## 2022-11-29 ENCOUNTER — NON-APPOINTMENT (OUTPATIENT)
Age: 57
End: 2022-11-29

## 2022-11-29 ENCOUNTER — OUTPATIENT (OUTPATIENT)
Dept: OUTPATIENT SERVICES | Facility: HOSPITAL | Age: 57
LOS: 1 days | End: 2022-11-29
Payer: MEDICAID

## 2022-11-29 DIAGNOSIS — C24.1 MALIGNANT NEOPLASM OF AMPULLA OF VATER: ICD-10-CM

## 2022-11-29 LAB
ALBUMIN SERPL ELPH-MCNC: 3.5 G/DL — SIGNIFICANT CHANGE UP (ref 3.3–5)
ALP SERPL-CCNC: 179 U/L — HIGH (ref 40–120)
ALT FLD-CCNC: 18 U/L — SIGNIFICANT CHANGE UP (ref 10–45)
ANION GAP SERPL CALC-SCNC: 12 MMOL/L — SIGNIFICANT CHANGE UP (ref 5–17)
AST SERPL-CCNC: 36 U/L — SIGNIFICANT CHANGE UP (ref 10–40)
BASOPHILS # BLD AUTO: 0.03 K/UL — SIGNIFICANT CHANGE UP (ref 0–0.2)
BASOPHILS NFR BLD AUTO: 0.8 % — SIGNIFICANT CHANGE UP (ref 0–2)
BILIRUB SERPL-MCNC: 0.6 MG/DL — SIGNIFICANT CHANGE UP (ref 0.2–1.2)
BUN SERPL-MCNC: 9 MG/DL — SIGNIFICANT CHANGE UP (ref 7–23)
CALCIUM SERPL-MCNC: 9.3 MG/DL — SIGNIFICANT CHANGE UP (ref 8.4–10.5)
CHLORIDE SERPL-SCNC: 103 MMOL/L — SIGNIFICANT CHANGE UP (ref 96–108)
CO2 SERPL-SCNC: 24 MMOL/L — SIGNIFICANT CHANGE UP (ref 22–31)
CREAT SERPL-MCNC: 0.41 MG/DL — LOW (ref 0.5–1.3)
EGFR: 115 ML/MIN/1.73M2 — SIGNIFICANT CHANGE UP
EOSINOPHIL # BLD AUTO: 0.14 K/UL — SIGNIFICANT CHANGE UP (ref 0–0.5)
EOSINOPHIL NFR BLD AUTO: 3.6 % — SIGNIFICANT CHANGE UP (ref 0–6)
GLUCOSE SERPL-MCNC: 166 MG/DL — HIGH (ref 70–99)
HCT VFR BLD CALC: 29.1 % — LOW (ref 34.5–45)
HGB BLD-MCNC: 9.4 G/DL — LOW (ref 11.5–15.5)
IMM GRANULOCYTES NFR BLD AUTO: 0.5 % — SIGNIFICANT CHANGE UP (ref 0–0.9)
LYMPHOCYTES # BLD AUTO: 0.99 K/UL — LOW (ref 1–3.3)
LYMPHOCYTES # BLD AUTO: 25.2 % — SIGNIFICANT CHANGE UP (ref 13–44)
MCHC RBC-ENTMCNC: 25.2 PG — LOW (ref 27–34)
MCHC RBC-ENTMCNC: 32.3 G/DL — SIGNIFICANT CHANGE UP (ref 32–36)
MCV RBC AUTO: 78 FL — LOW (ref 80–100)
MONOCYTES # BLD AUTO: 0.5 K/UL — SIGNIFICANT CHANGE UP (ref 0–0.9)
MONOCYTES NFR BLD AUTO: 12.7 % — SIGNIFICANT CHANGE UP (ref 2–14)
NEUTROPHILS # BLD AUTO: 2.25 K/UL — SIGNIFICANT CHANGE UP (ref 1.8–7.4)
NEUTROPHILS NFR BLD AUTO: 57.2 % — SIGNIFICANT CHANGE UP (ref 43–77)
NRBC # BLD: 0 /100 WBCS — SIGNIFICANT CHANGE UP (ref 0–0)
PLATELET # BLD AUTO: 107 K/UL — LOW (ref 150–400)
POTASSIUM SERPL-MCNC: 3.5 MMOL/L — SIGNIFICANT CHANGE UP (ref 3.5–5.3)
POTASSIUM SERPL-SCNC: 3.5 MMOL/L — SIGNIFICANT CHANGE UP (ref 3.5–5.3)
PROT SERPL-MCNC: 6.9 G/DL — SIGNIFICANT CHANGE UP (ref 6–8.3)
RBC # BLD: 3.73 M/UL — LOW (ref 3.8–5.2)
RBC # FLD: 17.7 % — HIGH (ref 10.3–14.5)
SODIUM SERPL-SCNC: 140 MMOL/L — SIGNIFICANT CHANGE UP (ref 135–145)
WBC # BLD: 3.93 K/UL — SIGNIFICANT CHANGE UP (ref 3.8–10.5)
WBC # FLD AUTO: 3.93 K/UL — SIGNIFICANT CHANGE UP (ref 3.8–10.5)

## 2022-11-29 PROCEDURE — 93970 EXTREMITY STUDY: CPT | Mod: 26

## 2022-11-29 PROCEDURE — 93970 EXTREMITY STUDY: CPT

## 2022-11-29 PROCEDURE — 99214 OFFICE O/P EST MOD 30 MIN: CPT

## 2022-11-30 DIAGNOSIS — E86.0 DEHYDRATION: ICD-10-CM

## 2022-11-30 DIAGNOSIS — Z51.11 ENCOUNTER FOR ANTINEOPLASTIC CHEMOTHERAPY: ICD-10-CM

## 2022-11-30 DIAGNOSIS — R11.2 NAUSEA WITH VOMITING, UNSPECIFIED: ICD-10-CM

## 2022-11-30 LAB — CANCER AG19-9 SERPL-ACNC: 4580 U/ML — HIGH

## 2022-12-07 NOTE — HISTORY OF PRESENT ILLNESS
[Home] : at home, [unfilled] , at the time of the visit. [Medical Office: ([facility])___] : at the medical office located in  [de-identified] : 57 year old female presents for a post op visit. 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 Patient had acute onset of RUQ pain yesterday, with no inciting factors or relief with aleve. The patient's family also endorsed 1-2 episodes of NBNB emesis yesterday and has had decreased PO intake over the last 36 hours. As of diagnosis of cancer, the patient has not had follow up with oncology or surg-oncology due insurance issues. The patient has an appointment with [facility] on Wednesday for surgical evaluation. In the ED, patient febrile to 101.5, HR 98, BPs decreased to 98/41, found with leukocytosis to 15.2, lactate 4.4. She was found with RUQ pain, fever, and jaundice (Bilirubin 3.9) consistent with acute cholangitis. CT A/P revealed Hyperattenuation and mild wall thickening involving the CBD raises the possibility of cholangitis with pneumobilia related to recent ERCP. In the ED, patient received IVF, vanc/zosyn, morphine, and zofran. Patient admitted with severe sepsis 2/2 acute cholangitis. MICU was consulted for hypotension, but this improved s/p fluids and IV antibiotics and patient did not warrant MICU level care. GI was consulted and recommended ERCP. The patient was maintained on zosyn for IV antibiotics. Patient remained stable on zosyn, white count trended down, lactate improved, and fevers were resolving. BCs ultimately grew E.Coli. 1/7: ERCP performed. Stent was removed from the biliary tree. One plastic stent was placed into the common bile duct. The major papilla appeared to have a mass.Oncology was consulted given patient's poor outpatient oncology follow up due to insurance issues, they recommended CT scan and  MRI abdomen/pelvis with contrast for continued staging of cancer workup. \[facility] \[facility] 1/8/19 CT A/P - showed Small bilateral pleural effusions with bibasilar atelectasis. No lung nodules. Mildly enlarged right cardiophrenic angle lymph node is unchanged since December 18, 2018 is indeterminate\par \par 1/8/19  MRI-  IMPRESSION: *  No suspicious liver lesions. No abnormality is identified to correspond with questionable lesion identified on prior CT. * Intra and extrahepatic biliary ductal dilation, with mural enhancement which may be seen in the setting of cholangitis. *  Cholelithiasis with gallbladder wall thickening.Pt  transferred to surgical oncology service and went to the OR on 1/11/19. \par \par 1/11/19- S/p ex-lap, Whipple pancreaticoduodenectomy for duodenal adenocarcinoma. \par \par FINAL PATHOLOGY:\par -Invasive adenocarcinoma of the duodenal ampulla (Tumor size: 2.5 x 1.5 x 0.8 cm); Tumor stage: pT3b pN1\par -Vascular and perineural invasion identified\par -1/24 lymph nodes involved by adenocarcinoma\par -Negative margins\par -S/p cholecystectomy ; acute cholecystitis with cholelithiasis \par -Bile duct margin with acute inflammation and focal atypia consistent with low grade dysplasia. \par \par Intraop findings: pylorus sparing Whipple.  Pt tolerated procedure well, without complication.\par \par On 1/18/19, patient was a surgical rapid response for which pt transferred to SICU. Patient was febrile to 102. Cultures sent. Antibiotics changed from zosyn to meropenem. Patient was tachypneic and hypotensive to 70s systolic. CBC revealed drop in H/H to 6/19 with elevated lactate of 9. Decision was made to intubate patient. Massive Transfusion protocol initiated. Patient received 4PRBC 5FFP and 1platelet, and bolus 1L of fluid. Surgical team and attending notified when Peak pressures on vent were 50s and increased abdominal distention. Decision was then made to take patient emergently to OR. Patient had evacuation of hematoma, cessation of gastroduodenal artery bleeding with two hemostatic stitches, and placement of abthera VAC. Pt transferred to SICU post op. Patient hypotensive post-op with low UOP. 2 L bolus given.  CBC remained stable. Patient hypotensive to 80s systolic, [place] started and increased, patient was then transitioned to levo. Lactate downtrended to 7.6 and H/H stable. \par \par On 1/19 pt cont to require pressors, resuscitated with IVF, and had drop H/H for which transfused. On 1/21/19, Pt febrile to 100.6 overnight, cultures sent, UA+ nitrite, follow up urine cultures. \par \par Pt RTOR on 1/21/19 for Exploratory laparotomy, drainage of abdominal abscess, repair of abdominal wall hernia with Strattice mesh, b/ abdominal wall advancement flaps. Post-op patient remained intubated and  off vasopressors. FENA calculated to be pre-renal. 500CC albumin bolus given, urine output improved. 1/22/19 attempted spontaneous breathing trials but pt hypoxic. Pt lactate uptrending, resuscitated w/IVF, and pt hypotensive requiring pressors. UCx growing candida, Diflucan changed to micafungin to r/o resistant organisms in the setting of worsening sepsis. On 1/23/19, patient noted to have blood oozing from \par midline incision while hypotensive and on pressors.  Peak pressures noted to be elevated on vent and abdomen increasingly distended.  All staples removed and a large amount of clot evacuated from midline incision. 1U PRBC given. No active bleeding appreciated.  Fascia remains closed.  Wound packed with wet to dry dressing.  Vitamin K given for elevated INR.  1/24/19 nutrition consulted and pt. started on TPN. Plastic consulted bc midline opened for hematoma evacuation yesterday, external tissue expander device (Dermaclose) applied to the wound. Patient hypotensive requiring reinitiation of low dose vasopressors. On 1/25/19, sedatives and pressors were decreased. Pt was volume overloaded and was given Albumin, Lasix, and a Bumex drip. O/N, pt febrile 102, given tylenol. mildly hypotensive SBP 80, started on small dose levo. 1/27 Cont SBT. Decision to continue diuresis with Diamox 1/28 Patient febrile to 101 today. Diuresing with lasix/bumex, giving albumin. Continued SBTs.  On 1/29/19, patient tolerating CPAP. Patient extubated without issue. NGT was removed. Patient started on clears, continue TPN while caloric intake is still low. 1/30/19 CT scan performed and showed b/l moderate pleural effusions with associated atelectasis.  Partially \par imaged right lower lung patchy opacity may represent small infiltrate in the appropriate clinical setting. Moderate abdominopelvic ascites most prominent subjacent to the anterior  abdominal wall.  Left upper and lower quadrant collection measuring greater than fluid attenuation, which may reflect the presence of hemorrhagic products. Mild enhancement is seen in association with \par the anterior abdominal ascites, dependent pelvic ascites, and left mid abdominal collection, consistent with the presence of peritonitis. Findings may be postoperative or related to the presence of hemorrhagic products.Pt started on po labetalol for persistent HTN, however pt with hypotensive episode in PM shortly after administration. Patient given albumin. 2/1/19, persistently febrile, Tmax 101.3, hypotensive, tachycardic, and tachypneac. Albumin and IVF given, BP did not respond. Levophed restarted. JOURDAN X 2 started to drain copious amounts of bilious outpt. JOURDAN #1 700cc and JOURDAN #1 500cc. Given stat dose of vancomycin and \par meropenum.  2/2/19 CT scan performed and showed New diffuse peritoneal enhancement, compatible with peritonitis, more extensive compared to 1/30/2019. Multiple abdominal and pelvic fluid collections consistent with abscesses. New small bowel wall may also edema and colonic wall thickening, likely reactive. Left upper quadrant collection measuring greater than fluid attenuation is grossly unchanged from 1/30/2019 .On 2/3/19 she received 2 u prbc overnight, placed on BIPAP overnight,  vanc \par started overnight, start diflucan, vaso and levo started overnight, ABG and CBC, PM labs, A line placed overnight. Plan for IR drainage tomorrow. On 2/4/19: on pressors overnight, removed in AM. IR today for abscess drainage. advance tiger tube, NGT removed. repeat labs, remove a line after procedure. Increased serobilious wound drainage, BID dressing changes.2/5/19: diflucan to micafungin. decreased seroquil to 25. IV lock. start trickle feeds glucerna. 2/6: no further diuresis at this time, high bilious midline wound output, vac to be placed today by plastics. Sinha out. feeds to goal today.2/7: Central line and sinha removed, discontinued Seroquel, feeds changed to continuous 2/8: Octreotide started for high output from midline wound. Lasix with goal net negative 2L2/9: Resent blood cx and UA, bedside vac change by plastics today.2/11: Patient ordered for CLD w/ TF via NJT, upper and lower portion of abdominal wound closed by plastics, send left JOURDAN for amylase 2/12: CT A&P shows two collections... drainage by IR today. Drain was upsized on the right and left  collection 50cc of pus was drained.2/13: Stable for dispo to floor. 2/15: ID was consulted and recommended Meropenem and to continue Vancomycin for better source control. Patient was also seen by PM&R and they recommended FARIBA if patient is able to participate in care.2/18: Wound was partially closed by plastics with interrupted sutures and an ostomy appliance was placed, VAC removed.2/19 Antibiotics was changed to Meropenem and Zyvox, culture grew VRE.2/20: Patient had a tube check and the LUQ drain was removed. CT showed improvement in abdominal collections. Calorie count was performed. Patient is tolerating a regular diet with GLucerna shakes. Octreotide discontinued on 2/26.2/28: Tube check done and RLQ drain was removed.Patient will be discharged home. Patient will have VNS for wound care and PT.  She will be discharged on 1 more week of antibiotics (Cipro and Flagyl per ID).  Discharged home on 3/1/19. \par \par INTERVAL HISTORY:\par 3/12/19 -  Doing better.  Tolerating PO diet.  + GI fxn.  Pain well controlled.  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/day.\par \par Recent Labs 3/14/19:\par WBC:   9.4 K/uL\par HGB:  9.0 g/dL\par RBC:  3.07 M/uL\par HCT:  25.9%\par PLT:  298 K/uL\par \par Metabolic:\par Na:  141 mmol/L\par K:  3.8 mmol/L\par Cl:  101 mmol/L\par CO2:  27 mmol/L\par Anion Gap:  13 mmol/L\par Glucose:  139 mg/dL\par BUN:  7 mg/dL\par Creat:  0.38 mg/dL\par Total Protein:  7.3 g/dL\par Albumin:  3.4 g/dL\par Serum Ca:  9.3 mg/dL\par Total Bili:  0.8 mg/dL\par AST:  34 U/L\par ALT:  14 U/L\par ALK Phos:  382 U/L\par eGFR:  121 mL/min/1.73M2\par \par CA 19-9:  42 U/mL\par CEA:  2.0 ng/mL\par \par Hepatitis C:  Nonreactive\par Hepatitis B Core Ab:  Nonreactive\par Hepatitis B Surface Ag:  Nonreactive\par \par APTT:  31.8 sec\par PT:  13.2 sec\par INR:  1.16 ratio\par \par 3/19/19: Today, Ms. Lagunas reports occasional nausea, no vomiting, no pain.  Abdominal wall fistula drained last on Saturday nigt 8 cc.  Not drained since.  Daughter states color of drainage has changed from creamy to tan color.  Denies, fevers, chills.   During this visit, sutures removed from midline incision, as well as drain removed (ostomy appliance).  Wound well healing with granulation tissue in the periphery of wound bed with yellow slough noted in bottom of wound bed.  Wound packed with wet to dry dressing.  Instruction and demonstration provided to patient's daughter on changing wet to dry dressing, however, advised she may return to the ostomy appliance if she feels drainage is increasing.  She will return to office to see me in 2 weeks for follow up.  \par \par INTERVAL HISTORY:\par 4/2/19- Daughter continues to change midline wound with wet to dry gauze dressing daily.  The patient is with c/o 7-8 out of 10 abdominal pain with movement and activity, partially relieved with Oxycodone.  She denies fever or chills.  Denies nausea or vomiting. Having daily Bm's and passing flatus.  Appetite continues to improve. The daughter states she is eating an adequate amount of food.  She is scheduled to see Dr. Katty Navarro (Heme-onc) tomorrow. \par \par 4/21/19 - MRI Abdomen- No evidence of hepatic metastatic disease.\par \par 4/30/19 - Feeling well. Incisions well healed. Scheduled to start chemotherapy under the care of Dr. Katty Navarro.  Denies abdominal pain, nausea, vomiting or changes in bowel habits. \par \par 7/30/19 - She is currently on Xeloda/Gemzar under the care of Dr. Katty Navarro.  Xeloda recently on hold due to Grade 2-3 hand foot syndrome.  She continues to use Betamethasone cream for the hand/foot syndrome.   CT Abdomen performed on 5/22/19 showing stable findings. States her appetite continues to improve and her weight remains stable.  Tolerating PO intake without nausea or vomiting. Reports daily BM's and passing flatus.  Denies fever or chills.  Reports occasional lower left/right abdominal discomfort with movement however has relief with wearing an abdominal binder.  \par \par 12/17/19- She completed 6 months of adjuvant Gemzar/Xeloda under the care of Dr. Navarro.   The patient states she continues to have 9 out of 10 abdominal pain daily with walking and every movement.  She is also experiencing bloating and distention.  She wears an abdominal binder with mild relief.  She was seen by GI who recommended a PPI and Gas-x and the patient states she has minimal relief from both.  Appetite is improving and her weight remains stable.  Reports daily BM's and passing flatus.  Denies fever or chills.  BW 11/2019 -  (29 U/mL); CEA (2.0 ng/mL); LFT's WNL;   CT C/A/P performed on 10/10/19 with stable findings, no evidence of metastatic disease.  Diastasis of the anterior abdominal wall with non obstructed small bowel. \par \par 12/8/2020- Continues f/u with Dr. Katty Navarro and was last seen 11/2/2020.  Patient underwent a f/u CT C/A/P on 11/6/2020 showing an unchanged right cardiophrenic lymph node and 2 small indeterminate lymph nodes at the root of the mesentery which are slightly increased in size.  F/u is recommended to assess for change.   Blood work 11/2/2020 (CEA- 1.9 ng/mL;  - 39 U/mL).    Reports ongoing abdominal pain/discomfort with activity and movement.  She has not seen a hernia specialist.  States her appetite isn't as great since surgery.  Weight remains stable. Denies nausea, vomiting.  Denies changes in bowel habits.  No fever or chills. \par \par 6/8/2021- Pt. was seen by Dr. Veronica on 5/7/2021 with c/o worsening "burning" abdominal pain.  Pts pain is r/t moderate sized ventral hernia for which surgery was not recommended.  CT C/A/P performed on 5/19/2021 showed unchanged cardiophrenic and mesenteric root lymph nodes, widemouthed ventral hernia.  Pt. was referred to pain management and was seen by Dr. Leary on 6/2/2021.  She was started on Gabapentin for neuropathic abdominal pain and Oxy IR 5 mg daily PRN. Pt. may take NSAIDs PRN.  She states that she is still experiencing pain despite taking Gabapentin.  She is now with c/o generalized body pain, not just abdominal pain. Continues to wear abdominal binder with some relief.  Pt. also advised to take up to 3 senna daily for constipation.  She continues f/u with Dr. Navarro and was last seen 5/10/2021.  BW 5/2021- CEA (1.4)  (33).  \par \par 12/7/2021- Pt. continues to experience generalized abdominal pain/discomfort.  She had a colonoscopy on 10/13/2021 and was found to have 3 benign polyps (2 hyperplastic polyps and 1 tubular adenoma).  Remains on Gabapentin for neuropathic abdominal pain. Reports occasional constipation which is relieved with senna/colace. Continues to wear an abdominal binder.  Met with general surgeon?  Pt. continues f/u with Dr. Katty Navarro (Heme-onc).  BW 11/2021 (CEA- 1.6 ng/mL);  (40 U/mL).  Pt. is scheduled for a CT C/A/P today at 6pm. \par \par CT C/A/P 12/7/2021- IMP: Stable disease.  S/p Whipple.  Prominent cardiophrenic lymph node and lymph nodes at the root of the mesentery are unchanged in size when compared to prior scan on 5/19/2021. \par \par **Given life limiting symptoms of abdominal pain and large ventral hernia defect with loss of domain, she is functionally limited by it.  She describes being unable to  heavier items, bend over, and pain on straining. It is unclear whether this is directly related to hernia or due to past surgical history and Whipple, however she is far enough out from surgery without evidence of recurrence that it is reasonable to repair her hernia to improve function and also hopefully improve pain symptoms.  I explained to her that the pain may still be present after surgery.\par \par ***SURGERY 1/21/2022-  S/p ventral hernia repair;  Abdominal wall reconstruction with mesh placement done by Dr. Hudson. 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 Status post embolization 1/26/22, with stabilized H/H.  Pt. was discharged home on 1/31/2022. \par \par 3/1/2022-  Pt. continues to have abdominal pain, 6-8/10 on pain scale, worse on the left side.  Pain minimally relieved with Tylenol. States her appetite isn't great but she is eating small amounts. Denies N/V.  Having BM's and passing flatus. Denies fever or chills. \par \par CT A/P 3/10/22- IMPRESSION:\par 1.)  Significant interval improvement of previously described rectus/anterior abdominal wall hematoma.  Previously described focal \par hematoma at the left anterior abdominal wall is decreased in size compared to the prior study, currently measuring 2.2 x 1.1 x 2.9 cm (image 76, series 2), previously measuring roughly 4.9 x 2.7 x 4.7 cm; please note however that an infected collection cannot be distinguished from a sterile collection on the basis of the current exam and clinical correlation is advised.\par 2.)  Stomach is distended with oral contrast; correlate for delayed gastric emptying.  No evidence for gastric outlet obstruction (oral contrast is seen throughout the small bowel and specifically, distal to the gastrojejunostomy anastomosis). No evidence for bowel obstruction or inflammation. Normal appendix.\par 3.)  Prominent cardiophrenic lymph node and lymph nodes at the root of the mesentery are unchanged compared to multiple prior studies.\par 4.)  Redemonstrated nodular hepatic contour.\par 5.)  Other findings, as above.\par \par 3/29/2022- Here to discuss recent CT results. See above. Continues to have abdominal pain. Denies N/V. \par \par PET/CT 7/1/22- masslike consolidation in the anterior left upper lung with intense uptake left paratracheal lymph node.  Consider CT biopsy.  Small focus of uptake in the right hepatic lobe near the severino hepatis suspicious for metastatic lesion.  Consider liver MRI.  Abdominal MRI 7/2022- cirrhosis but no focal hepatic lesion. \par \par 9/13/22 CA 19-9: 393 (increased from 11/2021 but decreased from 8/2022)\par \par 9/27/22- Follow up visit.  Now found to have new mets to the YUNI (S/p EBUS-guided FNA 7/20/22) and left paratracheal lymph node.  Planned for RT to left lung/LN and for pain control purposes.

## 2022-12-07 NOTE — ASSESSMENT
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  Well healed incisions.\par \par 12/17/19- She completed 6 months of adjuvant Gemzar/Xeloda under the care of Dr. Navarro.   The patient states she continues to have 9 out of 10 abdominal pain daily with walking and every movement.  She is also experiencing bloating and distention.  She wears an abdominal binder with mild relief.  She was seen by GI who recommended a PPI and Gas-x and the patient states she has minimal relief from both.  Appetite is improving and her weight remains stable.  Reports daily BM's and passing flatus.  Denies fever or chills.  BW 11/2019 -  (29 U/mL); CEA (2.0 ng/mL); LFT's WNL;   CT C/A/P performed on 10/10/19 with stable findings, no evidence of metastatic disease.  Diastasis of the anterior abdominal wall with non obstructed small bowel. \par \par 12/8/2020- Continues f/u with Dr. Katty Navarro and was last seen 11/2/2020.  Patient underwent a f/u CT C/A/P on 11/6/2020 showing an unchanged right cardiophrenic lymph node and 2 small indeterminate lymph nodes at the root of the mesentery which are slightly increased in size.  F/u is recommended to assess for change.   Blood work 11/2/2020 (CEA- 1.9 ng/mL;  - 39 U/mL).    Reports ongoing abdominal pain/discomfort with activity and movement.  She has not seen a hernia specialist.  States her appetite isn't as great since surgery.  Weight remains stable. Denies nausea, vomiting.  Denies changes in bowel habits.  No fever or chills. \par \par 6/8/2021- Pt. was seen by Dr. Veronica on 5/7/2021 with c/o worsening "burning" abdominal pain.  Pts pain is r/t moderate sized ventral hernia for which surgery was not recommended.  CT C/A/P performed on 5/19/2021 showed unchanged cardiophrenic and mesenteric root lymph nodes, widemouthed ventral hernia.  Pt. was referred to pain management and was seen by Dr. Leary on 6/2/2021.  She was started on Gabapentin for neuropathic abdominal pain and Oxy IR 5 mg daily PRN. Pt. may take NSAIDs PRN.  She states that she is still experiencing pain despite taking Gabapentin.  She is now with c/o generalized body pain, not just abdominal pain. Continues to wear abdominal binder with some relief.  Pt. also advised to take up to 3 senna daily for constipation.  She continues f/u with Dr. Navarro and was last seen 5/10/2021.  BW 5/2021- CEA (1.4)  (33).  \par \par 12/7/2021- Pt. continues to experience generalized abdominal pain/discomfort.  She had a colonoscopy on 10/13/2021 and was found to have 3 benign polyps (2 hyperplastic polyps and 1 tubular adenoma).  Remains on Gabapentin for neuropathic abdominal pain. Reports occasional constipation which is relieved with senna/Colace. Continues to wear an abdominal binder.  Met with general surgeon?  Pt. continues f/u with Dr. Katty Navarro ([facility]Onc).  BW 11/2021 (CEA- 1.6 ng/mL);  (40 U/mL).  Pt. is scheduled for a CT C/A/P today at 6pm. \par \par CT C/A/P 12/7/2021- IMP: Stable disease.  S/p Whipple.  Prominent cardiophrenic lymph node and lymph nodes at the root of the mesentery are unchanged in size when compared to prior scan on 5/19/2021. \par \par **Given life limiting symptoms of abdominal pain and large ventral hernia defect with loss of domain, she is functionally limited by it.  She describes being unable to  heavier items, bend over, and pain on straining. It is unclear whether this is directly related to hernia or due to past surgical history and Whipple, however she is far enough out from surgery without evidence of recurrence that it is reasonable to repair her hernia to improve function and also hopefully improve pain symptoms.  I explained to her that the pain may still be present after surgery.\par \par ***SURGERY 1/21/2022-  S/p ventral hernia repair;  Abdominal wall reconstruction with mesh placement done by Dr. Hudson.   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 Status post embolization 1/26/22, with stabilized H/H.  Pt. was discharged home on 1/31/2022. \par \par CT A/P 3/10/22- IMPRESSION:\par 1.)  Significant interval improvement of previously described rectus/anterior abdominal wall hematoma.  Previously described focal hematoma at the left anterior abdominal wall is decreased in size compared to the prior study, currently measuring 2.2 x 1.1 x 2.9 cm (image 76, series 2), previously measuring roughly 4.9 x 2.7 x 4.7 cm; please note however that an infected collection cannot be distinguished from a sterile collection on the basis of the current exam and clinical correlation is advised.\par 2.)  Stomach is distended with oral contrast; correlate for delayed gastric emptying.  No evidence for gastric outlet obstruction (oral contrast is seen throughout the small bowel and specifically, distal to the gastrojejunostomy anastomosis). No evidence for bowel obstruction or inflammation. Normal appendix.\par 3.)  Prominent cardiophrenic lymph node and lymph nodes at the root of the mesentery are unchanged compared to multiple prior studies.\par 4.)  Redemonstrated nodular hepatic contour.\par 5.)  Other findings, as above.\par \par Newly diagnosed lung metastases. \par \par PLAN:\par 1) Medical oncology follow-up - chemotherapy\par 2) RTO in 3 months

## 2022-12-08 ENCOUNTER — APPOINTMENT (OUTPATIENT)
Dept: RADIATION ONCOLOGY | Facility: CLINIC | Age: 57
End: 2022-12-08

## 2022-12-08 ENCOUNTER — APPOINTMENT (OUTPATIENT)
Dept: HEMATOLOGY ONCOLOGY | Facility: CLINIC | Age: 57
End: 2022-12-08

## 2022-12-08 ENCOUNTER — OUTPATIENT (OUTPATIENT)
Dept: OUTPATIENT SERVICES | Facility: HOSPITAL | Age: 57
LOS: 1 days | Discharge: HOME | End: 2022-12-08

## 2022-12-08 DIAGNOSIS — Z98.890 OTHER SPECIFIED POSTPROCEDURAL STATES: Chronic | ICD-10-CM

## 2022-12-08 DIAGNOSIS — Z51.11 ENCOUNTER FOR ANTINEOPLASTIC CHEMOTHERAPY: Chronic | ICD-10-CM

## 2022-12-08 DIAGNOSIS — Z90.410 ACQUIRED TOTAL ABSENCE OF PANCREAS: Chronic | ICD-10-CM

## 2022-12-08 DIAGNOSIS — C21.1 MALIGNANT NEOPLASM OF ANAL CANAL: ICD-10-CM

## 2022-12-08 DIAGNOSIS — R05.3 CHRONIC COUGH: ICD-10-CM

## 2022-12-08 DIAGNOSIS — K92.2 GASTROINTESTINAL HEMORRHAGE, UNSPECIFIED: Chronic | ICD-10-CM

## 2022-12-08 DIAGNOSIS — K59.09 OTHER CONSTIPATION: ICD-10-CM

## 2022-12-08 PROCEDURE — 99024 POSTOP FOLLOW-UP VISIT: CPT

## 2022-12-08 PROCEDURE — 99214 OFFICE O/P EST MOD 30 MIN: CPT | Mod: 95

## 2022-12-08 RX ORDER — OMEPRAZOLE 40 MG/1
40 CAPSULE, DELAYED RELEASE ORAL
Qty: 30 | Refills: 5 | Status: ACTIVE | COMMUNITY
Start: 2019-10-23 | End: 1900-01-01

## 2022-12-08 RX ORDER — DOCUSATE SODIUM 50 MG AND SENNOSIDES 8.6 MG 8.6; 5 MG/1; MG/1
8.6-5 TABLET, FILM COATED ORAL 3 TIMES DAILY
Qty: 90 | Refills: 0 | Status: ACTIVE | COMMUNITY
Start: 2022-12-08 | End: 1900-01-01

## 2022-12-08 NOTE — VITALS
[Maximal Pain Intensity: 10/10] : 10/10 [Least Pain Intensity: 10/10] : 10/10 [Pain Location: ___] : Pain Location: [unfilled] [Opioid] : opioid [Adjuvant (neuroleptics)] : adjuvant (neuroleptics) [70: Cares for self; unalbe to carry on normal activity or do active work.] : 70: Cares for self; unable to carry on normal activity or do active work.

## 2022-12-08 NOTE — HISTORY OF PRESENT ILLNESS
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 \par \par Initially was [facility] [place] [facility]18 with dizziness, found to have elevated LFTs. MRCP demonstrated a dilatation of the CBD. ERCP on 12/22/18 performed c/w ampullary mass with obstruction. Pathology c/w adenocarcinoma. Pt was discharged for out pt follow up with surg onc and med onc but was re-admitted on 1/5/19 with n/v, worsening abdominal pain, found to have severe sepsis with E coli bacteremia 2/2 acute cholangitis. \par \par 1/5/19: CT A/P Heterogeneity of the right hepatic lobe with questionable 1.3 cm hypoattenuating lesion in segment 7.\par 1/7/19: ERCP performed. Stent was removed from the biliary tree. One plastic stent placed\par 1/8/19: MRI Abd: No suspicious liver lesions. No abnormality is identified to correspond with questionable lesion identified on prior CT. 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  Taken to the OR for evacuation of hematoma, cessation of gastroduodenal artery bleeding with two hemostatic stitches, and placement of abthera VAC.  Required pressors in the SICU, then developed fever. \par 1/21-1/22/19: OR exploratory laparotomy, drainage of abdominal abscess, repair of abdominal wall hernia with Strattice mesh, b/ abdominal wall advancement flaps. Post-op patient remained intubated and off vasopressors. FENA calculated to be pre-renal. 500 CC albumin bolus given, urine output improved. 1/22/19 attempted spontaneous breathing trials but pt hypoxic. Pt lactate uptrend, resuscitated w/IVF, and pt hypotensive requiring pressors. UCx growing candida, Diflucan changed to micafungin to r/o resistant organisms in the setting of worsening sepsis. \par 1/23/19: patient noted to have blood oozing from midline incision while hypotensive and on pressors. Peak pressures noted to be elevated on vent and abdomen increasingly distended. All staples removed and a large amount of clot evacuated from midline incision. 1U PRBC given. No active bleeding appreciated. Fascia remains closed. Wound packed with wet to dry dressing. Vitamin K given for elevated INR. \par 1/24-1/29/19: remained in SICU, started on TPN, diuresed, finally extubated. \par 2/1/19: septic shock again, pressors restarted\par 2/2-2/11/19: CT scan with peritonitis, new abscesses. Ab and Antifungals started. Wound with increase in drainage. Octreotide started for increased output \par 2/12/19: drainage of abscesses by IR.  \par 2/18/19: Wound was partially closed by plastics with interrupted sutures and an ostomy appliance was placed, VAC removed.\par 3/1/19. d/c home \par 4/30-10/21/19: s/p 8 cycles Gemzar/Xeloda chemotherapy\par 10/10/19: CT CAP: no evidence of mets\par 1/20-11/2020: prolonged stay in [facility] due to COVID \par 11/6/20: CT CAP: 2 mildly enlarged mesenteric LN, attention in f/u\par 5/19/21: CT CAP revealed stable exam, unchanged cardiophrenic and mesenteric root lymph nodes\par 12/7/21: CT CAP: stable disease\par 1/21/22: ventral hernia repair \par 3/10/22: CT abd/pel: significant interval improvement of previously described rectus/anterior abdominal wall hematoma, previously described focal hematoma at the left anterior abdominal wall is decreased in size\par 7/1/22: PET/CT: Masslike consolidation in the anterior left upper lung with intense uptake and left paratracheal lymph node, small focus of uptake in the right hepatic lobe near the severino hepatis suspicious for metastatic lesion.\par 7/19-7/22/22: [facility] admission s/p bronchoscopy 7/20/22; 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) \par 8/8-8/22/22: [id number] Gemzar/Abraxane\par 9/6/22: tx held (ANC 0.82)\par 9/13-9/27/22: [id number] Gemzar/Abraxane (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)\par 10/12/22: completed 15/15 fx to L Lung\par 10/22/22-11/1/22: [facility] admission due to sepsis with UTI and post obstructive pna \par 11/29/22: C1 Gemzar [de-identified] : adenocarcinoma [de-identified] : CA [id number] [de-identified] : FINAL PATHOLOGY:\par -Invasive adenocarcinoma of the duodenal ampulla (Tumor size: 2.5 x 1.5 x 0.8 cm); Tumor stage: pT3b pN1\par -Vascular and perineural invasion identified\par -1/24 lymph nodes involved by adenocarcinoma\par -Negative margins\par -S/p cholecystectomy ; acute cholecystitis with cholelithiasis \par -Bile duct margin with acute inflammation and focal atypia consistent with low grade dysplasia. \par  [de-identified] : Patient resumed chemotherapy last week. Despite a negative DVT result from 11/29/22 US , she continues to experience persistent low back pain radiating down the left leg (to her ankle, unable to walk/bear weight). She takes Oxycodone 5 mg TID + Gabapentin 300 mg BID but it provides mild relief. There is difficulty in completing self-care ADLs (family provides assistance). Her cough is present but manageable (uses Robitussin and Promethazine/Codeine cough syrup 10 ml q 4-6 hrs, it provides mild/temporary relief). Patient's daughter is making a conscious effort to reinforce her oral intake (stable weight noted today). Her bowel movements remain regular at this time (normal in color, consistency, frequency).

## 2022-12-08 NOTE — REVIEW OF SYSTEMS
[Fatigue] : fatigue [Recent Change In Weight] : ~T recent weight change [Cough] : cough [Abdominal Pain] : abdominal pain [Joint Pain] : joint pain [Anxiety] : anxiety [Negative] : Allergic/Immunologic [de-identified] : +peripheral neuropathy

## 2022-12-08 NOTE — REVIEW OF SYSTEMS
[Constipation: Grade 1 - Occasional or intermittent symptoms; occasional use of stool softeners, laxatives, dietary modification, or enema] : Constipation: Grade 1 - Occasional or intermittent symptoms; occasional use of stool softeners, laxatives, dietary modification, or enema [Diarrhea: Grade 0] : Diarrhea: Grade 0 [Dysphagia: Grade 0] : Dysphagia: Grade 0 [Esophagitis: Grade 0] : Esophagitis: Grade 0 [Nausea: Grade 1 - Loss of appetite without alteration in eating habits] : Nausea: Grade 1 - Loss of appetite without alteration in eating habits [Vomiting: Grade 0] : Vomiting: Grade 0 [Fatigue: Grade 1 - Fatigue relieved by rest] : Fatigue: Grade 1 - Fatigue relieved by rest [Dyspnea: Grade 2 - Shortness of breath with minimal exertion; limiting instrumental ADL] : Dyspnea: Grade 2 - Shortness of breath with minimal exertion; limiting instrumental ADL [Pneumonitis: Grade 0] : Pneumonitis: Grade 0 [Fatigue] : fatigue [Cough] : cough [SOB on Exertion] : shortness of breath during exertion [Muscle Pain] : muscle pain [Gait Disturbance] : gait disturbance [Negative] : Psychiatric [Cough: Grade 1 - Mild symptoms; nonprescription intervention indicated] : Cough: Grade 1 - Mild symptoms; nonprescription intervention indicated

## 2022-12-08 NOTE — REASON FOR VISIT
[Post-Treatment Evaluation] : post-treatment evaluation for [Other: ___] : [unfilled] [Family Member] : family member [Other: _____] : [unfilled]

## 2022-12-09 NOTE — HISTORY OF PRESENT ILLNESS
[Home] : at home, [unfilled] , at the time of the visit. [Medical Office: ([facility])___] : at the medical office located in  [Verbal consent obtained from patient] : the patient, [unfilled] [Family Member] : family member [Other:____] : [unfilled] [FreeTextEntry1] : 58 yo F with diagnosed stage III, pT3N1b ampullary CA, s/p Whipple pancreaticoduodenectomy and 8 cycles Gemzar/Xeloda, recently found to have new mets to YUNI and left paratracheal lymph node, presents for discussion of RT for oligometastases to lung and for pain control.  With new found oligometastases to lung/LN, we discussed adding local radiation therapy for local control and possibly to improve disease free survival. Given the location and LN involvement, I did not feel SBRT would be appropriate. Hence I recommended a moderately hypofractionated course of radiation to the left lung/LN/hilum totaling 45Gy/15fx which she completed 10/14/2022\par \par She was hospitalized at [facility] from 10/21/2022 -11/1/2022 for stomach pain and weakness. CT consistent with gastritis and colitis, she was followed by GI, Not deemed a good candidate at the time for EGD, received with Antibiotics for PNA  and Antifungal medications for candida esophagitis. \par \par 12/8/2022:  Patient is seen for follow up and her CA 19-9 is trending up from 1362 to 4580 within a couple of weeks.  \par Her inpatient CT chest from 10/25/2022 showed Interval decrease in size of left upper lobe/paramediastinal mass and adjacent metastatic disease.  Interval development of new ground glass opacities in the left upper lobe, suggestive of infection in the setting of recent chemotherapy.  \par \par She continues on Coryell/Abraxane and recently had US LE on 11/29/2022 due to leg pain which was negative for DVT. \par Today she has ongoing severe left leg pain, unable to walk and bear weight on the extremity. Denies warmth, swelling and redness. Taking Oxycodone 5 mg TID and Gabapentin 300 mg BID.Pain in unrelieved. She has stable mild cough, denies SOB, chest pain.

## 2022-12-09 NOTE — PHYSICAL EXAM
[Oriented To Time, Place, And Person] : oriented to person, place, and time [de-identified] : telehealth

## 2022-12-12 ENCOUNTER — OUTPATIENT (OUTPATIENT)
Dept: OUTPATIENT SERVICES | Facility: HOSPITAL | Age: 57
LOS: 1 days | End: 2022-12-12
Payer: MEDICAID

## 2022-12-12 ENCOUNTER — APPOINTMENT (OUTPATIENT)
Dept: MRI IMAGING | Facility: IMAGING CENTER | Age: 57
End: 2022-12-12

## 2022-12-12 DIAGNOSIS — K92.2 GASTROINTESTINAL HEMORRHAGE, UNSPECIFIED: Chronic | ICD-10-CM

## 2022-12-12 DIAGNOSIS — Z51.11 ENCOUNTER FOR ANTINEOPLASTIC CHEMOTHERAPY: Chronic | ICD-10-CM

## 2022-12-12 DIAGNOSIS — Z98.890 OTHER SPECIFIED POSTPROCEDURAL STATES: Chronic | ICD-10-CM

## 2022-12-12 DIAGNOSIS — C24.1 MALIGNANT NEOPLASM OF AMPULLA OF VATER: ICD-10-CM

## 2022-12-12 DIAGNOSIS — Z90.410 ACQUIRED TOTAL ABSENCE OF PANCREAS: Chronic | ICD-10-CM

## 2022-12-12 PROCEDURE — 72158 MRI LUMBAR SPINE W/O & W/DYE: CPT

## 2022-12-12 PROCEDURE — 72158 MRI LUMBAR SPINE W/O & W/DYE: CPT | Mod: 26

## 2022-12-12 PROCEDURE — 72157 MRI CHEST SPINE W/O & W/DYE: CPT | Mod: 26

## 2022-12-12 PROCEDURE — A9585: CPT

## 2022-12-12 PROCEDURE — 72157 MRI CHEST SPINE W/O & W/DYE: CPT

## 2022-12-13 ENCOUNTER — RESULT REVIEW (OUTPATIENT)
Age: 57
End: 2022-12-13

## 2022-12-13 ENCOUNTER — NON-APPOINTMENT (OUTPATIENT)
Age: 57
End: 2022-12-13

## 2022-12-13 ENCOUNTER — APPOINTMENT (OUTPATIENT)
Dept: INFUSION THERAPY | Facility: HOSPITAL | Age: 57
End: 2022-12-13

## 2022-12-13 ENCOUNTER — APPOINTMENT (OUTPATIENT)
Dept: HEMATOLOGY ONCOLOGY | Facility: CLINIC | Age: 57
End: 2022-12-13
Payer: MEDICAID

## 2022-12-13 LAB
BASOPHILS # BLD AUTO: 0.02 K/UL — SIGNIFICANT CHANGE UP (ref 0–0.2)
BASOPHILS NFR BLD AUTO: 0.5 % — SIGNIFICANT CHANGE UP (ref 0–2)
EOSINOPHIL # BLD AUTO: 0.08 K/UL — SIGNIFICANT CHANGE UP (ref 0–0.5)
EOSINOPHIL NFR BLD AUTO: 2.2 % — SIGNIFICANT CHANGE UP (ref 0–6)
HCT VFR BLD CALC: 27 % — LOW (ref 34.5–45)
HGB BLD-MCNC: 8.8 G/DL — LOW (ref 11.5–15.5)
IMM GRANULOCYTES NFR BLD AUTO: 0.5 % — SIGNIFICANT CHANGE UP (ref 0–0.9)
LYMPHOCYTES # BLD AUTO: 0.87 K/UL — LOW (ref 1–3.3)
LYMPHOCYTES # BLD AUTO: 23.4 % — SIGNIFICANT CHANGE UP (ref 13–44)
MCHC RBC-ENTMCNC: 25.3 PG — LOW (ref 27–34)
MCHC RBC-ENTMCNC: 32.6 G/DL — SIGNIFICANT CHANGE UP (ref 32–36)
MCV RBC AUTO: 77.6 FL — LOW (ref 80–100)
MONOCYTES # BLD AUTO: 0.64 K/UL — SIGNIFICANT CHANGE UP (ref 0–0.9)
MONOCYTES NFR BLD AUTO: 17.2 % — HIGH (ref 2–14)
NEUTROPHILS # BLD AUTO: 2.09 K/UL — SIGNIFICANT CHANGE UP (ref 1.8–7.4)
NEUTROPHILS NFR BLD AUTO: 56.2 % — SIGNIFICANT CHANGE UP (ref 43–77)
NRBC # BLD: 0 /100 WBCS — SIGNIFICANT CHANGE UP (ref 0–0)
PLATELET # BLD AUTO: 133 K/UL — LOW (ref 150–400)
RBC # BLD: 3.48 M/UL — LOW (ref 3.8–5.2)
RBC # FLD: 16.7 % — HIGH (ref 10.3–14.5)
WBC # BLD: 3.72 K/UL — LOW (ref 3.8–10.5)
WBC # FLD AUTO: 3.72 K/UL — LOW (ref 3.8–10.5)

## 2022-12-13 PROCEDURE — 99214 OFFICE O/P EST MOD 30 MIN: CPT

## 2022-12-14 DIAGNOSIS — Z51.11 ENCOUNTER FOR ANTINEOPLASTIC CHEMOTHERAPY: ICD-10-CM

## 2022-12-14 DIAGNOSIS — E86.0 DEHYDRATION: ICD-10-CM

## 2022-12-14 DIAGNOSIS — R11.2 NAUSEA WITH VOMITING, UNSPECIFIED: ICD-10-CM

## 2022-12-14 LAB
ALBUMIN SERPL ELPH-MCNC: 3.3 G/DL — SIGNIFICANT CHANGE UP (ref 3.3–5)
ALP SERPL-CCNC: 218 U/L — HIGH (ref 40–120)
ALT FLD-CCNC: 17 U/L — SIGNIFICANT CHANGE UP (ref 10–45)
ANION GAP SERPL CALC-SCNC: 12 MMOL/L — SIGNIFICANT CHANGE UP (ref 5–17)
AST SERPL-CCNC: 32 U/L — SIGNIFICANT CHANGE UP (ref 10–40)
BILIRUB SERPL-MCNC: 0.5 MG/DL — SIGNIFICANT CHANGE UP (ref 0.2–1.2)
BUN SERPL-MCNC: 11 MG/DL — SIGNIFICANT CHANGE UP (ref 7–23)
CALCIUM SERPL-MCNC: 9.2 MG/DL — SIGNIFICANT CHANGE UP (ref 8.4–10.5)
CANCER AG19-9 SERPL-ACNC: 4400 U/ML — HIGH
CHLORIDE SERPL-SCNC: 103 MMOL/L — SIGNIFICANT CHANGE UP (ref 96–108)
CO2 SERPL-SCNC: 23 MMOL/L — SIGNIFICANT CHANGE UP (ref 22–31)
CREAT SERPL-MCNC: 0.51 MG/DL — SIGNIFICANT CHANGE UP (ref 0.5–1.3)
EGFR: 109 ML/MIN/1.73M2 — SIGNIFICANT CHANGE UP
GLUCOSE SERPL-MCNC: 153 MG/DL — HIGH (ref 70–99)
POTASSIUM SERPL-MCNC: 4 MMOL/L — SIGNIFICANT CHANGE UP (ref 3.5–5.3)
POTASSIUM SERPL-SCNC: 4 MMOL/L — SIGNIFICANT CHANGE UP (ref 3.5–5.3)
PROT SERPL-MCNC: 6.9 G/DL — SIGNIFICANT CHANGE UP (ref 6–8.3)
SODIUM SERPL-SCNC: 139 MMOL/L — SIGNIFICANT CHANGE UP (ref 135–145)

## 2022-12-16 ENCOUNTER — NON-APPOINTMENT (OUTPATIENT)
Age: 57
End: 2022-12-16

## 2022-12-19 ENCOUNTER — RESULT REVIEW (OUTPATIENT)
Age: 57
End: 2022-12-19

## 2022-12-23 ENCOUNTER — APPOINTMENT (OUTPATIENT)
Dept: NUCLEAR MEDICINE | Facility: IMAGING CENTER | Age: 57
End: 2022-12-23
Payer: MEDICAID

## 2022-12-23 ENCOUNTER — RESULT REVIEW (OUTPATIENT)
Age: 57
End: 2022-12-23

## 2022-12-23 ENCOUNTER — OUTPATIENT (OUTPATIENT)
Dept: OUTPATIENT SERVICES | Facility: HOSPITAL | Age: 57
LOS: 1 days | End: 2022-12-23
Payer: MEDICAID

## 2022-12-23 ENCOUNTER — NON-APPOINTMENT (OUTPATIENT)
Age: 57
End: 2022-12-23

## 2022-12-23 ENCOUNTER — APPOINTMENT (OUTPATIENT)
Dept: CT IMAGING | Facility: IMAGING CENTER | Age: 57
End: 2022-12-23
Payer: MEDICAID

## 2022-12-23 DIAGNOSIS — Z51.11 ENCOUNTER FOR ANTINEOPLASTIC CHEMOTHERAPY: Chronic | ICD-10-CM

## 2022-12-23 DIAGNOSIS — Z98.890 OTHER SPECIFIED POSTPROCEDURAL STATES: Chronic | ICD-10-CM

## 2022-12-23 DIAGNOSIS — K92.2 GASTROINTESTINAL HEMORRHAGE, UNSPECIFIED: Chronic | ICD-10-CM

## 2022-12-23 DIAGNOSIS — Z00.8 ENCOUNTER FOR OTHER GENERAL EXAMINATION: ICD-10-CM

## 2022-12-23 DIAGNOSIS — C24.1 MALIGNANT NEOPLASM OF AMPULLA OF VATER: ICD-10-CM

## 2022-12-23 DIAGNOSIS — Z90.410 ACQUIRED TOTAL ABSENCE OF PANCREAS: Chronic | ICD-10-CM

## 2022-12-23 PROCEDURE — 74177 CT ABD & PELVIS W/CONTRAST: CPT | Mod: 26

## 2022-12-23 PROCEDURE — A9561: CPT

## 2022-12-23 PROCEDURE — 71260 CT THORAX DX C+: CPT | Mod: 26

## 2022-12-23 PROCEDURE — 74177 CT ABD & PELVIS W/CONTRAST: CPT

## 2022-12-23 PROCEDURE — 78306 BONE IMAGING WHOLE BODY: CPT | Mod: 26

## 2022-12-23 PROCEDURE — 78830 RP LOCLZJ TUM SPECT W/CT 1: CPT | Mod: 26

## 2022-12-23 PROCEDURE — 78306 BONE IMAGING WHOLE BODY: CPT

## 2022-12-23 PROCEDURE — 71260 CT THORAX DX C+: CPT

## 2022-12-23 PROCEDURE — 78830 RP LOCLZJ TUM SPECT W/CT 1: CPT

## 2022-12-27 ENCOUNTER — RESULT REVIEW (OUTPATIENT)
Age: 57
End: 2022-12-27

## 2022-12-27 ENCOUNTER — APPOINTMENT (OUTPATIENT)
Dept: INFUSION THERAPY | Facility: HOSPITAL | Age: 57
End: 2022-12-27

## 2022-12-27 ENCOUNTER — NON-APPOINTMENT (OUTPATIENT)
Age: 57
End: 2022-12-27

## 2022-12-27 ENCOUNTER — APPOINTMENT (OUTPATIENT)
Dept: HEMATOLOGY ONCOLOGY | Facility: CLINIC | Age: 57
End: 2022-12-27

## 2022-12-27 LAB
BASOPHILS # BLD AUTO: 0.02 K/UL — SIGNIFICANT CHANGE UP (ref 0–0.2)
BASOPHILS NFR BLD AUTO: 0.4 % — SIGNIFICANT CHANGE UP (ref 0–2)
EOSINOPHIL # BLD AUTO: 0.06 K/UL — SIGNIFICANT CHANGE UP (ref 0–0.5)
EOSINOPHIL NFR BLD AUTO: 1.3 % — SIGNIFICANT CHANGE UP (ref 0–6)
HCT VFR BLD CALC: 28.8 % — LOW (ref 34.5–45)
HGB BLD-MCNC: 9.5 G/DL — LOW (ref 11.5–15.5)
IMM GRANULOCYTES NFR BLD AUTO: 0.9 % — SIGNIFICANT CHANGE UP (ref 0–0.9)
LYMPHOCYTES # BLD AUTO: 0.98 K/UL — LOW (ref 1–3.3)
LYMPHOCYTES # BLD AUTO: 20.9 % — SIGNIFICANT CHANGE UP (ref 13–44)
MCHC RBC-ENTMCNC: 25.6 PG — LOW (ref 27–34)
MCHC RBC-ENTMCNC: 33 G/DL — SIGNIFICANT CHANGE UP (ref 32–36)
MCV RBC AUTO: 77.6 FL — LOW (ref 80–100)
MONOCYTES # BLD AUTO: 0.93 K/UL — HIGH (ref 0–0.9)
MONOCYTES NFR BLD AUTO: 19.8 % — HIGH (ref 2–14)
NEUTROPHILS # BLD AUTO: 2.66 K/UL — SIGNIFICANT CHANGE UP (ref 1.8–7.4)
NEUTROPHILS NFR BLD AUTO: 56.7 % — SIGNIFICANT CHANGE UP (ref 43–77)
NRBC # BLD: 0 /100 WBCS — SIGNIFICANT CHANGE UP (ref 0–0)
PLATELET # BLD AUTO: 113 K/UL — LOW (ref 150–400)
RBC # BLD: 3.71 M/UL — LOW (ref 3.8–5.2)
RBC # FLD: 16.2 % — HIGH (ref 10.3–14.5)
WBC # BLD: 4.69 K/UL — SIGNIFICANT CHANGE UP (ref 3.8–10.5)
WBC # FLD AUTO: 4.69 K/UL — SIGNIFICANT CHANGE UP (ref 3.8–10.5)

## 2022-12-27 PROCEDURE — 99213 OFFICE O/P EST LOW 20 MIN: CPT

## 2022-12-28 LAB
ALBUMIN SERPL ELPH-MCNC: 3.7 G/DL — SIGNIFICANT CHANGE UP (ref 3.3–5)
ALP SERPL-CCNC: 244 U/L — HIGH (ref 40–120)
ALT FLD-CCNC: 23 U/L — SIGNIFICANT CHANGE UP (ref 10–45)
ANION GAP SERPL CALC-SCNC: 13 MMOL/L — SIGNIFICANT CHANGE UP (ref 5–17)
AST SERPL-CCNC: 37 U/L — SIGNIFICANT CHANGE UP (ref 10–40)
BILIRUB SERPL-MCNC: 0.7 MG/DL — SIGNIFICANT CHANGE UP (ref 0.2–1.2)
BUN SERPL-MCNC: 7 MG/DL — SIGNIFICANT CHANGE UP (ref 7–23)
CALCIUM SERPL-MCNC: 9 MG/DL — SIGNIFICANT CHANGE UP (ref 8.4–10.5)
CANCER AG19-9 SERPL-ACNC: 6820 U/ML — HIGH
CHLORIDE SERPL-SCNC: 100 MMOL/L — SIGNIFICANT CHANGE UP (ref 96–108)
CO2 SERPL-SCNC: 23 MMOL/L — SIGNIFICANT CHANGE UP (ref 22–31)
CREAT SERPL-MCNC: 0.47 MG/DL — LOW (ref 0.5–1.3)
EGFR: 112 ML/MIN/1.73M2 — SIGNIFICANT CHANGE UP
GLUCOSE SERPL-MCNC: 119 MG/DL — HIGH (ref 70–99)
POTASSIUM SERPL-MCNC: 4.2 MMOL/L — SIGNIFICANT CHANGE UP (ref 3.5–5.3)
POTASSIUM SERPL-SCNC: 4.2 MMOL/L — SIGNIFICANT CHANGE UP (ref 3.5–5.3)
PROT SERPL-MCNC: 7.1 G/DL — SIGNIFICANT CHANGE UP (ref 6–8.3)
SODIUM SERPL-SCNC: 135 MMOL/L — SIGNIFICANT CHANGE UP (ref 135–145)

## 2022-12-28 NOTE — HISTORY OF PRESENT ILLNESS
[de-identified] : Patient here for Gemzar today. C/o of 9/10 groin pain radiating down her left leg. patient states Oxycodone 5mg is not helping and 10mg was causing her dizziness last night. Given patient dilaudid 1mg IV with improvement in sx. Spoke to daughter at chairside and informed her to make appt with palliative care, appt made at chairside . Pt starting process for medical marijuana. Pt also to f/u MRI results with primary team , pain m/l related to disease. Made primary team aware.

## 2022-12-28 NOTE — HISTORY OF PRESENT ILLNESS
[de-identified] : Patient here for tx today. Patient c/o left leg pain x 1 day. States she had similar pain while in the hospital recently. Pt describes pain as aching in both thigh down to calf. Patient did not take pain medication.pt denies numbness, tingling , trauma or swelling. On exam no swelling noted , non TTP . Given home oxycodone 5mg x1 oral tablet and b/l LE u/s to r/o DVT ordered stat. Primary team aware. Patient agrees and understands plan.

## 2022-12-29 ENCOUNTER — INPATIENT (INPATIENT)
Facility: HOSPITAL | Age: 57
LOS: 35 days | Discharge: HOME | End: 2023-02-03
Attending: STUDENT IN AN ORGANIZED HEALTH CARE EDUCATION/TRAINING PROGRAM | Admitting: STUDENT IN AN ORGANIZED HEALTH CARE EDUCATION/TRAINING PROGRAM
Payer: MEDICAID

## 2022-12-29 VITALS
SYSTOLIC BLOOD PRESSURE: 127 MMHG | HEIGHT: 58.66 IN | HEART RATE: 83 BPM | DIASTOLIC BLOOD PRESSURE: 60 MMHG | TEMPERATURE: 98 F | RESPIRATION RATE: 15 BRPM | OXYGEN SATURATION: 100 %

## 2022-12-29 DIAGNOSIS — Z98.890 OTHER SPECIFIED POSTPROCEDURAL STATES: Chronic | ICD-10-CM

## 2022-12-29 DIAGNOSIS — Z51.11 ENCOUNTER FOR ANTINEOPLASTIC CHEMOTHERAPY: Chronic | ICD-10-CM

## 2022-12-29 DIAGNOSIS — Z90.410 ACQUIRED TOTAL ABSENCE OF PANCREAS: Chronic | ICD-10-CM

## 2022-12-29 DIAGNOSIS — K92.2 GASTROINTESTINAL HEMORRHAGE, UNSPECIFIED: Chronic | ICD-10-CM

## 2022-12-29 PROCEDURE — 99285 EMERGENCY DEPT VISIT HI MDM: CPT

## 2022-12-29 NOTE — HISTORY OF PRESENT ILLNESS
[de-identified] : Patient here for Gemzar today with her daughter who is .. She is c/o of 8/10 groin pain radiating down her left leg. patient states she took 2.5mg methadone. but still in pain.  Did not take Oxycodone.  Ordered patient Dilaudid 1mg IV with improvement in sx. Spoke to daughter who is make appointment with radiation oncologist for palliative radiation.

## 2022-12-29 NOTE — ED ADULT TRIAGE NOTE - CHIEF COMPLAINT QUOTE
alert oriented Luxembourgish speaking c/o left leg pain x 1 month MRI showed "spot" on lower spine  PMHx lung Ca pancreatic Ca on chemo last a week ago DM2

## 2022-12-30 ENCOUNTER — APPOINTMENT (OUTPATIENT)
Dept: HEMATOLOGY ONCOLOGY | Facility: CLINIC | Age: 57
End: 2022-12-30

## 2022-12-30 DIAGNOSIS — C25.9 MALIGNANT NEOPLASM OF PANCREAS, UNSPECIFIED: ICD-10-CM

## 2022-12-30 DIAGNOSIS — E11.9 TYPE 2 DIABETES MELLITUS WITHOUT COMPLICATIONS: ICD-10-CM

## 2022-12-30 DIAGNOSIS — D61.818 OTHER PANCYTOPENIA: ICD-10-CM

## 2022-12-30 DIAGNOSIS — R63.8 OTHER SYMPTOMS AND SIGNS CONCERNING FOOD AND FLUID INTAKE: ICD-10-CM

## 2022-12-30 DIAGNOSIS — Z51.5 ENCOUNTER FOR PALLIATIVE CARE: ICD-10-CM

## 2022-12-30 DIAGNOSIS — G89.3 NEOPLASM RELATED PAIN (ACUTE) (CHRONIC): ICD-10-CM

## 2022-12-30 DIAGNOSIS — R11.2 NAUSEA WITH VOMITING, UNSPECIFIED: ICD-10-CM

## 2022-12-30 DIAGNOSIS — R74.01 ELEVATION OF LEVELS OF LIVER TRANSAMINASE LEVELS: ICD-10-CM

## 2022-12-30 DIAGNOSIS — M79.652 PAIN IN LEFT THIGH: ICD-10-CM

## 2022-12-30 DIAGNOSIS — J45.909 UNSPECIFIED ASTHMA, UNCOMPLICATED: ICD-10-CM

## 2022-12-30 DIAGNOSIS — I10 ESSENTIAL (PRIMARY) HYPERTENSION: ICD-10-CM

## 2022-12-30 DIAGNOSIS — K21.9 GASTRO-ESOPHAGEAL REFLUX DISEASE WITHOUT ESOPHAGITIS: ICD-10-CM

## 2022-12-30 LAB
ALBUMIN SERPL ELPH-MCNC: 3.9 G/DL — SIGNIFICANT CHANGE UP (ref 3.3–5)
ALP SERPL-CCNC: 217 U/L — HIGH (ref 40–120)
ALT FLD-CCNC: 53 U/L — HIGH (ref 4–33)
ANION GAP SERPL CALC-SCNC: 11 MMOL/L — SIGNIFICANT CHANGE UP (ref 7–14)
ANISOCYTOSIS BLD QL: SLIGHT — SIGNIFICANT CHANGE UP
AST SERPL-CCNC: 81 U/L — HIGH (ref 4–32)
BASOPHILS # BLD AUTO: 0 K/UL — SIGNIFICANT CHANGE UP (ref 0–0.2)
BASOPHILS NFR BLD AUTO: 0 % — SIGNIFICANT CHANGE UP (ref 0–2)
BILIRUB SERPL-MCNC: 1.1 MG/DL — SIGNIFICANT CHANGE UP (ref 0.2–1.2)
BUN SERPL-MCNC: 8 MG/DL — SIGNIFICANT CHANGE UP (ref 7–23)
CALCIUM SERPL-MCNC: 9.6 MG/DL — SIGNIFICANT CHANGE UP (ref 8.4–10.5)
CHLORIDE SERPL-SCNC: 98 MMOL/L — SIGNIFICANT CHANGE UP (ref 98–107)
CO2 SERPL-SCNC: 26 MMOL/L — SIGNIFICANT CHANGE UP (ref 22–31)
CREAT SERPL-MCNC: 0.47 MG/DL — LOW (ref 0.5–1.3)
EGFR: 112 ML/MIN/1.73M2 — SIGNIFICANT CHANGE UP
EOSINOPHIL # BLD AUTO: 0.03 K/UL — SIGNIFICANT CHANGE UP (ref 0–0.5)
EOSINOPHIL NFR BLD AUTO: 0.9 % — SIGNIFICANT CHANGE UP (ref 0–6)
FLUAV AG NPH QL: SIGNIFICANT CHANGE UP
FLUBV AG NPH QL: SIGNIFICANT CHANGE UP
GIANT PLATELETS BLD QL SMEAR: PRESENT — SIGNIFICANT CHANGE UP
GLUCOSE BLDC GLUCOMTR-MCNC: 150 MG/DL — HIGH (ref 70–99)
GLUCOSE BLDC GLUCOMTR-MCNC: 162 MG/DL — HIGH (ref 70–99)
GLUCOSE BLDC GLUCOMTR-MCNC: 172 MG/DL — HIGH (ref 70–99)
GLUCOSE SERPL-MCNC: 128 MG/DL — HIGH (ref 70–99)
HCT VFR BLD CALC: 30.1 % — LOW (ref 34.5–45)
HGB BLD-MCNC: 9.6 G/DL — LOW (ref 11.5–15.5)
HYPOCHROMIA BLD QL: SLIGHT — SIGNIFICANT CHANGE UP
IANC: 2.77 K/UL — SIGNIFICANT CHANGE UP (ref 1.8–7.4)
LYMPHOCYTES # BLD AUTO: 0.38 K/UL — LOW (ref 1–3.3)
LYMPHOCYTES # BLD AUTO: 10.6 % — LOW (ref 13–44)
MCHC RBC-ENTMCNC: 25.1 PG — LOW (ref 27–34)
MCHC RBC-ENTMCNC: 31.9 GM/DL — LOW (ref 32–36)
MCV RBC AUTO: 78.8 FL — LOW (ref 80–100)
MICROCYTES BLD QL: SLIGHT — SIGNIFICANT CHANGE UP
MONOCYTES # BLD AUTO: 0.12 K/UL — SIGNIFICANT CHANGE UP (ref 0–0.9)
MONOCYTES NFR BLD AUTO: 3.5 % — SIGNIFICANT CHANGE UP (ref 2–14)
NEUTROPHILS # BLD AUTO: 2.99 K/UL — SIGNIFICANT CHANGE UP (ref 1.8–7.4)
NEUTROPHILS NFR BLD AUTO: 84.1 % — HIGH (ref 43–77)
OVALOCYTES BLD QL SMEAR: SLIGHT — SIGNIFICANT CHANGE UP
PLAT MORPH BLD: NORMAL — SIGNIFICANT CHANGE UP
PLATELET # BLD AUTO: 135 K/UL — LOW (ref 150–400)
PLATELET COUNT - ESTIMATE: ABNORMAL
POIKILOCYTOSIS BLD QL AUTO: SLIGHT — SIGNIFICANT CHANGE UP
POTASSIUM SERPL-MCNC: 3.9 MMOL/L — SIGNIFICANT CHANGE UP (ref 3.5–5.3)
POTASSIUM SERPL-SCNC: 3.9 MMOL/L — SIGNIFICANT CHANGE UP (ref 3.5–5.3)
PROT SERPL-MCNC: 7.8 G/DL — SIGNIFICANT CHANGE UP (ref 6–8.3)
RBC # BLD: 3.82 M/UL — SIGNIFICANT CHANGE UP (ref 3.8–5.2)
RBC # FLD: 16.2 % — HIGH (ref 10.3–14.5)
RBC BLD AUTO: ABNORMAL
RSV RNA NPH QL NAA+NON-PROBE: SIGNIFICANT CHANGE UP
SARS-COV-2 RNA SPEC QL NAA+PROBE: SIGNIFICANT CHANGE UP
SMUDGE CELLS # BLD: PRESENT — SIGNIFICANT CHANGE UP
SODIUM SERPL-SCNC: 135 MMOL/L — SIGNIFICANT CHANGE UP (ref 135–145)
VARIANT LYMPHS # BLD: 0.9 % — SIGNIFICANT CHANGE UP (ref 0–6)
WBC # BLD: 3.56 K/UL — LOW (ref 3.8–10.5)
WBC # FLD AUTO: 3.56 K/UL — LOW (ref 3.8–10.5)

## 2022-12-30 PROCEDURE — 99255 IP/OBS CONSLTJ NEW/EST HI 80: CPT

## 2022-12-30 PROCEDURE — 73502 X-RAY EXAM HIP UNI 2-3 VIEWS: CPT | Mod: 26,LT

## 2022-12-30 PROCEDURE — 73562 X-RAY EXAM OF KNEE 3: CPT | Mod: 26,LT

## 2022-12-30 PROCEDURE — 99222 1ST HOSP IP/OBS MODERATE 55: CPT

## 2022-12-30 PROCEDURE — 73552 X-RAY EXAM OF FEMUR 2/>: CPT | Mod: 26,LT

## 2022-12-30 PROCEDURE — 99223 1ST HOSP IP/OBS HIGH 75: CPT | Mod: 25

## 2022-12-30 PROCEDURE — 99497 ADVNCD CARE PLAN 30 MIN: CPT | Mod: 25

## 2022-12-30 PROCEDURE — 99223 1ST HOSP IP/OBS HIGH 75: CPT

## 2022-12-30 RX ORDER — GABAPENTIN 400 MG/1
300 CAPSULE ORAL
Refills: 0 | Status: DISCONTINUED | OUTPATIENT
Start: 2022-12-30 | End: 2023-01-09

## 2022-12-30 RX ORDER — ONDANSETRON 8 MG/1
4 TABLET, FILM COATED ORAL ONCE
Refills: 0 | Status: COMPLETED | OUTPATIENT
Start: 2022-12-30 | End: 2022-12-30

## 2022-12-30 RX ORDER — MORPHINE SULFATE 50 MG/1
2 CAPSULE, EXTENDED RELEASE ORAL ONCE
Refills: 0 | Status: DISCONTINUED | OUTPATIENT
Start: 2022-12-30 | End: 2022-12-30

## 2022-12-30 RX ORDER — MORPHINE SULFATE 50 MG/1
4 CAPSULE, EXTENDED RELEASE ORAL ONCE
Refills: 0 | Status: DISCONTINUED | OUTPATIENT
Start: 2022-12-30 | End: 2022-12-30

## 2022-12-30 RX ORDER — ONDANSETRON 8 MG/1
4 TABLET, FILM COATED ORAL EVERY 8 HOURS
Refills: 0 | Status: DISCONTINUED | OUTPATIENT
Start: 2022-12-30 | End: 2023-01-03

## 2022-12-30 RX ORDER — ENOXAPARIN SODIUM 100 MG/ML
40 INJECTION SUBCUTANEOUS EVERY 24 HOURS
Refills: 0 | Status: DISCONTINUED | OUTPATIENT
Start: 2022-12-30 | End: 2023-01-25

## 2022-12-30 RX ORDER — MORPHINE SULFATE 50 MG/1
15 CAPSULE, EXTENDED RELEASE ORAL EVERY 6 HOURS
Refills: 0 | Status: DISCONTINUED | OUTPATIENT
Start: 2022-12-30 | End: 2022-12-30

## 2022-12-30 RX ORDER — PANTOPRAZOLE SODIUM 20 MG/1
40 TABLET, DELAYED RELEASE ORAL
Refills: 0 | Status: DISCONTINUED | OUTPATIENT
Start: 2022-12-30 | End: 2023-02-03

## 2022-12-30 RX ORDER — MORPHINE SULFATE 50 MG/1
2 CAPSULE, EXTENDED RELEASE ORAL EVERY 6 HOURS
Refills: 0 | Status: DISCONTINUED | OUTPATIENT
Start: 2022-12-30 | End: 2022-12-30

## 2022-12-30 RX ORDER — LANOLIN ALCOHOL/MO/W.PET/CERES
3 CREAM (GRAM) TOPICAL AT BEDTIME
Refills: 0 | Status: DISCONTINUED | OUTPATIENT
Start: 2022-12-30 | End: 2023-01-30

## 2022-12-30 RX ORDER — DEXTROSE 50 % IN WATER 50 %
25 SYRINGE (ML) INTRAVENOUS ONCE
Refills: 0 | Status: DISCONTINUED | OUTPATIENT
Start: 2022-12-30 | End: 2023-02-03

## 2022-12-30 RX ORDER — GLUCAGON INJECTION, SOLUTION 0.5 MG/.1ML
1 INJECTION, SOLUTION SUBCUTANEOUS ONCE
Refills: 0 | Status: DISCONTINUED | OUTPATIENT
Start: 2022-12-30 | End: 2023-02-03

## 2022-12-30 RX ORDER — INSULIN LISPRO 100/ML
VIAL (ML) SUBCUTANEOUS AT BEDTIME
Refills: 0 | Status: DISCONTINUED | OUTPATIENT
Start: 2022-12-30 | End: 2023-02-03

## 2022-12-30 RX ORDER — SODIUM CHLORIDE 9 MG/ML
1000 INJECTION, SOLUTION INTRAVENOUS
Refills: 0 | Status: DISCONTINUED | OUTPATIENT
Start: 2022-12-30 | End: 2023-02-03

## 2022-12-30 RX ORDER — DEXTROSE 50 % IN WATER 50 %
15 SYRINGE (ML) INTRAVENOUS ONCE
Refills: 0 | Status: DISCONTINUED | OUTPATIENT
Start: 2022-12-30 | End: 2023-02-03

## 2022-12-30 RX ORDER — OXYCODONE HYDROCHLORIDE 5 MG/1
15 TABLET ORAL EVERY 4 HOURS
Refills: 0 | Status: DISCONTINUED | OUTPATIENT
Start: 2022-12-30 | End: 2023-01-03

## 2022-12-30 RX ORDER — INFLUENZA VIRUS VACCINE 15; 15; 15; 15 UG/.5ML; UG/.5ML; UG/.5ML; UG/.5ML
0.5 SUSPENSION INTRAMUSCULAR ONCE
Refills: 0 | Status: DISCONTINUED | OUTPATIENT
Start: 2022-12-30 | End: 2023-02-03

## 2022-12-30 RX ORDER — OXYCODONE HYDROCHLORIDE 5 MG/1
10 TABLET ORAL EVERY 6 HOURS
Refills: 0 | Status: DISCONTINUED | OUTPATIENT
Start: 2022-12-30 | End: 2022-12-30

## 2022-12-30 RX ORDER — OXYCODONE HYDROCHLORIDE 5 MG/1
15 TABLET ORAL EVERY 6 HOURS
Refills: 0 | Status: DISCONTINUED | OUTPATIENT
Start: 2022-12-30 | End: 2022-12-30

## 2022-12-30 RX ORDER — ACETAMINOPHEN 500 MG
650 TABLET ORAL EVERY 6 HOURS
Refills: 0 | Status: DISCONTINUED | OUTPATIENT
Start: 2022-12-30 | End: 2023-01-26

## 2022-12-30 RX ORDER — CHOLECALCIFEROL (VITAMIN D3) 125 MCG
400 CAPSULE ORAL DAILY
Refills: 0 | Status: DISCONTINUED | OUTPATIENT
Start: 2022-12-30 | End: 2023-02-03

## 2022-12-30 RX ORDER — OXYCODONE HYDROCHLORIDE 5 MG/1
10 TABLET ORAL EVERY 4 HOURS
Refills: 0 | Status: DISCONTINUED | OUTPATIENT
Start: 2022-12-30 | End: 2023-01-05

## 2022-12-30 RX ORDER — DEXTROSE 50 % IN WATER 50 %
12.5 SYRINGE (ML) INTRAVENOUS ONCE
Refills: 0 | Status: DISCONTINUED | OUTPATIENT
Start: 2022-12-30 | End: 2023-02-03

## 2022-12-30 RX ORDER — ATORVASTATIN CALCIUM 80 MG/1
10 TABLET, FILM COATED ORAL AT BEDTIME
Refills: 0 | Status: DISCONTINUED | OUTPATIENT
Start: 2022-12-30 | End: 2022-12-30

## 2022-12-30 RX ORDER — INSULIN LISPRO 100/ML
VIAL (ML) SUBCUTANEOUS
Refills: 0 | Status: DISCONTINUED | OUTPATIENT
Start: 2022-12-30 | End: 2023-02-03

## 2022-12-30 RX ORDER — METOCLOPRAMIDE HCL 10 MG
10 TABLET ORAL ONCE
Refills: 0 | Status: COMPLETED | OUTPATIENT
Start: 2022-12-30 | End: 2022-12-30

## 2022-12-30 RX ORDER — POLYETHYLENE GLYCOL 3350 17 G/17G
17 POWDER, FOR SOLUTION ORAL DAILY
Refills: 0 | Status: DISCONTINUED | OUTPATIENT
Start: 2022-12-30 | End: 2023-01-06

## 2022-12-30 RX ORDER — MONTELUKAST 4 MG/1
10 TABLET, CHEWABLE ORAL AT BEDTIME
Refills: 0 | Status: DISCONTINUED | OUTPATIENT
Start: 2022-12-30 | End: 2023-02-03

## 2022-12-30 RX ORDER — METHADONE HYDROCHLORIDE 40 MG/1
2.5 TABLET ORAL
Refills: 0 | Status: DISCONTINUED | OUTPATIENT
Start: 2022-12-30 | End: 2023-01-05

## 2022-12-30 RX ADMIN — ONDANSETRON 4 MILLIGRAM(S): 8 TABLET, FILM COATED ORAL at 21:52

## 2022-12-30 RX ADMIN — GABAPENTIN 300 MILLIGRAM(S): 400 CAPSULE ORAL at 17:28

## 2022-12-30 RX ADMIN — Medication 3 MILLIGRAM(S): at 21:52

## 2022-12-30 RX ADMIN — MORPHINE SULFATE 2 MILLIGRAM(S): 50 CAPSULE, EXTENDED RELEASE ORAL at 11:14

## 2022-12-30 RX ADMIN — Medication 10 MILLIGRAM(S): at 22:49

## 2022-12-30 RX ADMIN — Medication 2: at 17:29

## 2022-12-30 RX ADMIN — ENOXAPARIN SODIUM 40 MILLIGRAM(S): 100 INJECTION SUBCUTANEOUS at 17:28

## 2022-12-30 RX ADMIN — MONTELUKAST 10 MILLIGRAM(S): 4 TABLET, CHEWABLE ORAL at 21:52

## 2022-12-30 RX ADMIN — MORPHINE SULFATE 15 MILLIGRAM(S): 50 CAPSULE, EXTENDED RELEASE ORAL at 09:51

## 2022-12-30 RX ADMIN — Medication 400 UNIT(S): at 11:08

## 2022-12-30 RX ADMIN — ONDANSETRON 4 MILLIGRAM(S): 8 TABLET, FILM COATED ORAL at 12:33

## 2022-12-30 RX ADMIN — Medication 1 TABLET(S): at 11:09

## 2022-12-30 RX ADMIN — MORPHINE SULFATE 4 MILLIGRAM(S): 50 CAPSULE, EXTENDED RELEASE ORAL at 01:22

## 2022-12-30 RX ADMIN — POLYETHYLENE GLYCOL 3350 17 GRAM(S): 17 POWDER, FOR SOLUTION ORAL at 11:08

## 2022-12-30 RX ADMIN — METHADONE HYDROCHLORIDE 2.5 MILLIGRAM(S): 40 TABLET ORAL at 17:28

## 2022-12-30 RX ADMIN — ONDANSETRON 4 MILLIGRAM(S): 8 TABLET, FILM COATED ORAL at 01:21

## 2022-12-30 RX ADMIN — Medication 5 MILLIGRAM(S): at 21:52

## 2022-12-30 RX ADMIN — OXYCODONE HYDROCHLORIDE 10 MILLIGRAM(S): 5 TABLET ORAL at 15:50

## 2022-12-30 NOTE — ED PROVIDER NOTE - PROGRESS NOTE DETAILS
Krista Dawson DO (PGY2): Pt with continued pain after morphine. Pt unable to ambulated in the ED. Spoke with hospitalist, will admit to his service for pain control and palliative pain consult.

## 2022-12-30 NOTE — H&P ADULT - HISTORY OF PRESENT ILLNESS
Pt is a 58 yo F with PMH pancreat CA (mets to L spine, L femur; Cong, chemo ambraxane/gemzar 12/27, radiation ?12/15), T2D, GERD, asthma, and HTN p/w LLE pain x 1mo with difficulty ambulating x1wk. Recently admitted to [facility] 10/22-11/1 for abdominal pain, found to have gastritis and colitis likely in the setting of radiation with course c/b E coli UTI and PNA s/p abx. Pt requested daughter to provide majority of PMH and serve as . Pt with new LLE pain since Thanksgiving and had MRI early Dec showing metastatic disease to L spine and L femur. Underwent first session of radiation shortly after but has not yet scheduled repeat sessions. Had been on oxycodone 5mg which was increased to 10mg without relief. Was then referred to pain management who started methadone 5mg without relief. Appears that pt may have stopped oxycodone when started on methadone. Also with chronic poor appetite and poor PO intake. Denies any other complaints.     On arrival, T 97.7, HR 83, /60, RR 15 O2sat 100% RA. Labs with WBC 3.56, Hb 9.6, MCV 78, plt 135, , AST 81, ALT 53, RVP neg. XR L knee, L hip, pelvis, and L femur all neg acute fx. Pt given morphine 4mg IV and zofran 4mg IV. Admitted to medicine for further observation and management.

## 2022-12-30 NOTE — CONSULT NOTE ADULT - SUBJECTIVE AND OBJECTIVE BOX
Pt is a 58 yo F with PMH pancreat CA (mets to L spine, L femur; Cong, chemo ambraxane/gemzar 12/27, radiation ?12/15), T2D, GERD, asthma, and HTN p/w LLE pain x 1mo with difficulty ambulating x1wk. Bone scan 1 wk ago demonstrated lesions L femoral neck and lumbar spine.  Pt continues to ambulate w/ cane but able to walk shorter distances due to pain. No recent falls or trauma. Patient denies numbness or tingling in the LLE. Patient denies any other injuries. Follows at [facility] outpt and reports RT beginning to be discussed for mets to hip and spine but not yet begun.     ROS: 10 point review of systems otherwise negative unless noted in HPI    PMH:  No pertinent past medical history    Essential hypertension    Gastroesophageal reflux disease without esophagitis    Mild intermittent asthma without complication    Arthritis    Neuropathy    Type 2 diabetes mellitus without complication, without long-term current use of insulin    Adenocarcinoma of gallbladder    Cancer of ampulla of Vater      PSH:  No significant past surgical history    H/O Whipple procedure    Hemorrhage of gastroduodenal artery    H/O drainage of abscess    H/O eye surgery    Admission for chemotherapy      AH:    Meds: See med rec    T(C): 36.7 (12-30-22 @ 17:12)  HR: 73 (12-30-22 @ 17:12)  BP: 99/61 (12-30-22 @ 17:12)  RR: 17 (12-30-22 @ 17:12)  SpO2: 96% (12-30-22 @ 17:12)  Wt(kg): --                        9.6    3.56  )-----------( 135      ( 30 Dec 2022 01:26 )             30.1     12-30    135  |  98  |  8   ----------------------------<  128<H>  3.9   |  26  |  0.47<L>    Ca    9.6      30 Dec 2022 01:26    TPro  7.8  /  Alb  3.9  /  TBili  1.1  /  DBili  x   /  AST  81<H>  /  ALT  53<H>  /  AlkPhos  217<H>  12-30          PE  Gen: NAD, alert and oriented  Resp: Unlabored breathing  LLE: Skin intact, no ecchymosis,        SILT DP/SP/ Denise/Saph,        +EHL/FHL/TA/Gastroc,        Hip/Knee/ankle painless ROM,       DP+,        soft compartments, no calf ttp,        mild pain w/ log roll.      Secondary:  No TTP over bony landmarks, SILT BL, ROM intact BL, distal pulses palpable.    Imaging:  CT demonstrating L femoral neck lesion    Assessment/Plan:  57yFemale  with pancreatic adenocarcinoma s/p Whipple 2019 w/ mets to L femoral neck and lumbar spine    - Pain control   - NWB LLE , bedrest  - Please obtain labs including ESR/CRP, Alk Phos, LDH, SPEP/UPEP, PTH, TFT, iCal  - Recommend MRI pelvis  - Recommend hip xrays  - Recommend spine consult for lumbar spine lesion

## 2022-12-30 NOTE — H&P ADULT - PROBLEM SELECTOR PLAN 3
- WBC 3.56, Hb 9.6, plt 135  - at baseline per chart review  - likely in setting of known malignancy (chronic disease) and chemotherapy as above  - continue to trend

## 2022-12-30 NOTE — ED ADULT NURSE NOTE - CHIEF COMPLAINT QUOTE
alert oriented Malay speaking c/o left leg pain x 1 month MRI showed "spot" on lower spine  PMHx lung Ca pancreatic Ca on chemo last a week ago DM2

## 2022-12-30 NOTE — CONSULT NOTE ADULT - SUBJECTIVE AND OBJECTIVE BOX
Pt is a 58 yo F with PMH of  T2D, GERD, asthma, and HTN, and pancreatic adenocarcinoma s/p Whipple 2019 w/ mets to lung which was treated with RT 45Gy/15fx by Dr. Lee completed on 10/14/2022. She then found to have mets to L spine, L femur despite she is on chemo. Now she was admitted LLE pain x 1mo with difficulty ambulating x1wk due to pain. No recent falls or trauma. Patient denies numbness or tingling in the LLE.    On exam, She's complaining about pain on left groin which radiates to LLE, She initially had lower back pain but improved after admission with pain medication. She complains pain on thoracic spine instead today.    ROS: review of systems otherwise negative unless noted in HPI    PMH:  No pertinent past medical history  Essential hypertension  Gastroesophageal reflux disease without esophagitis  Mild intermittent asthma without complication  Arthritis  Neuropathy  Type 2 diabetes mellitus without complication, without long-term current use of insulin  Adenocarcinoma of gallbladder  Cancer of ampulla of Vater    PSH:  No significant past surgical history  H/O Whipple procedure  Hemorrhage of gastroduodenal artery  H/O drainage of abscess  H/O eye surgery  Admission for chemotherapy    Meds:(STANDING)  bisacodyl 5 milliGRAM(s) Oral at bedtime  cholecalciferol 400 Unit(s) Oral daily  dextrose 5%. 1000 milliLiter(s) (50 mL/Hr) IV Continuous <Continuous>  dextrose 5%. 1000 milliLiter(s) (100 mL/Hr) IV Continuous <Continuous>  dextrose 50% Injectable 25 Gram(s) IV Push once  dextrose 50% Injectable 12.5 Gram(s) IV Push once  dextrose 50% Injectable 25 Gram(s) IV Push once  enoxaparin Injectable 40 milliGRAM(s) SubCutaneous every 24 hours  gabapentin 300 milliGRAM(s) Oral two times a day  glucagon  Injectable 1 milliGRAM(s) IntraMuscular once  influenza   Vaccine 0.5 milliLiter(s) IntraMuscular once  insulin lispro (ADMELOG) corrective regimen sliding scale   SubCutaneous three times a day before meals  insulin lispro (ADMELOG) corrective regimen sliding scale   SubCutaneous at bedtime  melatonin 3 milliGRAM(s) Oral at bedtime  montelukast 10 milliGRAM(s) Oral at bedtime  multivitamin 1 Tablet(s) Oral daily  pantoprazole    Tablet 40 milliGRAM(s) Oral before breakfast  polyethylene glycol 3350 17 Gram(s) Oral daily    MEDICATIONS  (PRN):  acetaminophen     Tablet .. 650 milliGRAM(s) Oral every 6 hours PRN Temp greater or equal to 38C (100.4F), Mild Pain (1 - 3)  dextrose Oral Gel 15 Gram(s) Oral once PRN Blood Glucose LESS THAN 70 milliGRAM(s)/deciliter  ondansetron Injectable 4 milliGRAM(s) IV Push every 8 hours PRN Nausea and/or Vomiting      Allergies  No Known Allergies    Vital sign  T(C): 36.7 (12-30-22 @ 17:12)  HR: 73 (12-30-22 @ 17:12)  BP: 99/61 (12-30-22 @ 17:12)  RR: 17 (12-30-22 @ 17:12)  SpO2: 96% (12-30-22 @ 17:12)    PE:  Constitutional: thin and frail F, looks older than stated age, appears fatigued  Head: NC/AT  Eyes: PERRL, EOMI, anicteric sclera  ENT: no nasal discharge; MMM  Neck: supple; no JVD  Respiratory: CTA B/L; no W/R/R; on RA without respiratory distress  Cardiac: +S1/S2; RRR; no M/R/G  Gastrointestinal: soft, NT/ND; no rebound or guarding; +BSx4  Extremities: WWP, no clubbing or cyanosis; no peripheral edema  Musculoskeletal: mm strength 5/5 BL UE, 5/5 RLE,  LLE exam limited by pain; L thigh TTP.  T8-T10 spine mild TTP.   Vascular: 2+ radial, DP/PT pulses B/L  Dermatologic: skin warm, dry and intact; no rashes, wounds, or scars  Neurologic: AAOx3; CNII-XII grossly intact; no focal deficits  Psychiatric: affect and characteristics of appearance, verbalizations, behaviors are appropriate  Labs           9.6    3.56  )-----------( 135      ( 30 Dec 2022 01:26 )             30.1     12-30    135  |  98  |  8   ----------------------------<  128<H>  3.9   |  26  |  0.47<L>    Ca    9.6      30 Dec 2022 01:26    TPro  7.8  /  Alb  3.9  /  TBili  1.1  /  DBili  x   /  AST  81<H>  /  ALT  53<H>  /  AlkPhos  217<H>  12-30      MR T/L spine 12/12  Edema and abnormal enhancement involving the left superior articular process of L4. Findings concerning for metastatic disease. Correlation with nuclear medicine bone scan is recommended for further evaluation. Mild spondylosis. Findings suspicious for sigmoid wall thickening. Correlate clinically and if further evaluation is warranted, a dedicated CT the abdomen and pelvis is recommended. Focal T2 signal hyperintensity in the posterior aspect of the left lung field for which dedicated CT of the chest is recommended for further evaluation.    CTAP 12/23  No gross evidence for recurrent or metastatic disease. Focal patchy opacities in the superior segment of the left lower lobe not seen previously and in the left upper lobe, raising concern for  infection. Previously visualized more diffuse groundglass opacity in the left upper lobe is no longer identified. Please correlate clinically. Trace left pleural fluid has decreased.    Nuclear Bone Scan 12/23:  IMPRESSION: Abnormal bone scan.  Abnormal foci in the left femoral neck and lumbar spine highly suspicious for osseous metastases. Osseous lesion in the left femoral neck places the patient at risk for pathologic fracture.    Left Femur, hip, knee 12/30  IMPRESSION:  No acute fracture or dislocation.      Plan:  -Continue pain management  -Ortho consult to assess fracture risk and if there is a role of surgical intervention  -Xray and MRI of pelvis are awaited  -Will re-eval after results available    Incomplete note     Pt is a 58 yo F with PMH of  T2D, GERD, asthma, and HTN, and pancreatic adenocarcinoma s/p Whipple 2019 w/ mets to lung which was treated with RT 45Gy/15fx by Dr. Lee completed on 10/14/2022. She then found to have mets to L spine, L femur despite she is on chemo. Now she was admitted LLE pain x 1mo with difficulty ambulating x1wk due to pain. No recent falls or trauma. Patient denies numbness or tingling in the LLE.    On exam, She's complaining about pain on left groin which radiates to LLE, She initially had lower back pain but improved after admission with pain medication. She complains pain on thoracic spine instead today.    ROS: review of systems otherwise negative unless noted in HPI    PMH:  No pertinent past medical history  Essential hypertension  Gastroesophageal reflux disease without esophagitis  Mild intermittent asthma without complication  Arthritis  Neuropathy  Type 2 diabetes mellitus without complication, without long-term current use of insulin  Adenocarcinoma of gallbladder  Cancer of ampulla of Vater    PSH:  No significant past surgical history  H/O Whipple procedure  Hemorrhage of gastroduodenal artery  H/O drainage of abscess  H/O eye surgery  Admission for chemotherapy    Meds:(STANDING)  bisacodyl 5 milliGRAM(s) Oral at bedtime  cholecalciferol 400 Unit(s) Oral daily  dextrose 5%. 1000 milliLiter(s) (50 mL/Hr) IV Continuous <Continuous>  dextrose 5%. 1000 milliLiter(s) (100 mL/Hr) IV Continuous <Continuous>  dextrose 50% Injectable 25 Gram(s) IV Push once  dextrose 50% Injectable 12.5 Gram(s) IV Push once  dextrose 50% Injectable 25 Gram(s) IV Push once  enoxaparin Injectable 40 milliGRAM(s) SubCutaneous every 24 hours  gabapentin 300 milliGRAM(s) Oral two times a day  glucagon  Injectable 1 milliGRAM(s) IntraMuscular once  influenza   Vaccine 0.5 milliLiter(s) IntraMuscular once  insulin lispro (ADMELOG) corrective regimen sliding scale   SubCutaneous three times a day before meals  insulin lispro (ADMELOG) corrective regimen sliding scale   SubCutaneous at bedtime  melatonin 3 milliGRAM(s) Oral at bedtime  montelukast 10 milliGRAM(s) Oral at bedtime  multivitamin 1 Tablet(s) Oral daily  pantoprazole    Tablet 40 milliGRAM(s) Oral before breakfast  polyethylene glycol 3350 17 Gram(s) Oral daily    MEDICATIONS  (PRN):  acetaminophen     Tablet .. 650 milliGRAM(s) Oral every 6 hours PRN Temp greater or equal to 38C (100.4F), Mild Pain (1 - 3)  dextrose Oral Gel 15 Gram(s) Oral once PRN Blood Glucose LESS THAN 70 milliGRAM(s)/deciliter  ondansetron Injectable 4 milliGRAM(s) IV Push every 8 hours PRN Nausea and/or Vomiting      Allergies  No Known Allergies    Vital sign  T(C): 36.7 (12-30-22 @ 17:12)  HR: 73 (12-30-22 @ 17:12)  BP: 99/61 (12-30-22 @ 17:12)  RR: 17 (12-30-22 @ 17:12)  SpO2: 96% (12-30-22 @ 17:12)    PE:  Constitutional: thin and frail F, looks older than stated age, appears fatigued  Head: NC/AT  Eyes: PERRL, EOMI, anicteric sclera  ENT: no nasal discharge; MMM  Neck: supple; no JVD  Respiratory: CTA B/L; no W/R/R; on RA without respiratory distress  Cardiac: +S1/S2; RRR; no M/R/G  Gastrointestinal: soft, NT/ND; no rebound or guarding; +BSx4  Extremities: WWP, no clubbing or cyanosis; no peripheral edema  Musculoskeletal: mm strength 5/5 BL UE, 5/5 RLE,  LLE exam limited by pain; L thigh TTP.  T8-T10 spine mild TTP.   Vascular: 2+ radial, DP/PT pulses B/L  Dermatologic: skin warm, dry and intact; no rashes, wounds, or scars  Neurologic: AAOx3; CNII-XII grossly intact; no focal deficits  Psychiatric: affect and characteristics of appearance, verbalizations, behaviors are appropriate  Labs           9.6    3.56  )-----------( 135      ( 30 Dec 2022 01:26 )             30.1     12-30    135  |  98  |  8   ----------------------------<  128<H>  3.9   |  26  |  0.47<L>    Ca    9.6      30 Dec 2022 01:26    TPro  7.8  /  Alb  3.9  /  TBili  1.1  /  DBili  x   /  AST  81<H>  /  ALT  53<H>  /  AlkPhos  217<H>  12-30      MR T/L spine 12/12  Edema and abnormal enhancement involving the left superior articular process of L4. Findings concerning for metastatic disease. Correlation with nuclear medicine bone scan is recommended for further evaluation. Mild spondylosis. Findings suspicious for sigmoid wall thickening. Correlate clinically and if further evaluation is warranted, a dedicated CT the abdomen and pelvis is recommended. Focal T2 signal hyperintensity in the posterior aspect of the left lung field for which dedicated CT of the chest is recommended for further evaluation.    CTAP 12/23  No gross evidence for recurrent or metastatic disease. Focal patchy opacities in the superior segment of the left lower lobe not seen previously and in the left upper lobe, raising concern for  infection. Previously visualized more diffuse groundglass opacity in the left upper lobe is no longer identified. Please correlate clinically. Trace left pleural fluid has decreased.    Nuclear Bone Scan 12/23:  IMPRESSION: Abnormal bone scan.  Abnormal foci in the left femoral neck and lumbar spine highly suspicious for osseous metastases. Osseous lesion in the left femoral neck places the patient at risk for pathologic fracture.    Left Femur, hip, knee 12/30  IMPRESSION:  No acute fracture or dislocation.

## 2022-12-30 NOTE — CONSULT NOTE ADULT - PROBLEM SELECTOR RECOMMENDATION 4
Discussed symptom management recommendations with primary team    Thank you for allowing us to participate in your patient's care. Please page [phone] for any questions/concerns.

## 2022-12-30 NOTE — ED PROVIDER NOTE - CLINICAL SUMMARY MEDICAL DECISION MAKING FREE TEXT BOX
57F PMH pancreatic cancer with metastasis to lumbar spine and femur on chemotherapy (last dose 12/27/22), presenting to the ED for 2 days of worsening left leg pain, which became so severe that she was unable to walk today. Given hx and physical, ddx includes but is not limited to pain secondary to metastatic cancer. Low concern for cord compression given pt neuro exam nonfocal, no saddle anesthesia, no urinary retention/incontinence. Low concern for pathologic fracture given no trauma and physical exam findings without gross deformity. Will obtain labs, XR, meds, reassess.

## 2022-12-30 NOTE — ED PROVIDER NOTE - OBJECTIVE STATEMENT
57F PMH pancreatic cancer with metastasis to lumbar spine and femur on chemotherapy (last dose 12/27/22), 57F PMH pancreatic cancer with metastasis to lumbar spine and femur on chemotherapy (last dose 12/27/22), presenting to the ED for 2 days of worsening left leg pain, which became so severe that she was unable to walk today.  Pt had MRI patient showing metastatic lesions to lumbar spine and left femur a few days ago. Patient on methadone and oxycodone at home for pain.  Patient states she was taking the oxycodone every 6 hours as prescribed however it was not helping so she went back to her doctor who prescribed methadone.  Patient was under the impression that she was supposed to be taking methadone instead of the oxycodone however she was supposed to be taking methadone in addition to the oxycodone. Patient denies fall or trauma to the area.  Patient denies urinary incontinence, fecal incontinence, urinary retention, constipation, saddle anesthesia.  Patient denies back pain.

## 2022-12-30 NOTE — CONSULT NOTE ADULT - ASSESSMENT
Pt is a 56 yo F with PMH pancreat CA (mets to L spine, L femur; Cong, chemo ambraxane/gemzar 12/27, radiation ?12/15), T2D, GERD, asthma, and HTN p/w LLE pain x 1mo with difficulty ambulating x1wk. Likely in the setting of known metastatic disease. Admitted for PT eval and pain control . Oncology consulted for further recommendations    Nuclear Bone Scan 12/23:   IMPRESSION: Abnormal bone scan.  Abnormal foci in the left femoral neck and lumbar spine highly suspicious   for osseous metastases.  Osseous lesion in the left femoral neck places the patient at risk for   pathologic fracture.    MR T/L spine 12/12  Edema and abnormal enhancement involving the left superior articular   process of L4. Findings concerning for metastatic disease. Correlation   with nuclear medicine bone scan is recommended for further evaluation.  Mild spondylosis.  Findings suspicious for sigmoid wall thickening. Correlate clinically and   if further evaluation is warranted, a dedicated CT the abdomen and pelvis   is recommended.  Focal T2 signal hyperintensity in the posterior aspect of the left lung   field for which dedicated  CT of the chest is recommended for further evaluation.      Left Femur, hip, kneee 12/30  IMPRESSION:  No acute fracture or dislocation.    Please get Palliative Care consult for pain management  Please get Radiation Oncology Consult for further eval and role for inpatient RT  Monitor CBC daily, pt counts may go down in the setting of recent chemo  No plans for inpatient chemotherapy  -Rest of care as per primary team  -Patient to followup with  ([facility]) upon discharge  -C/w Supportive care, pain control, Nutrition, PT, DVT ppx  -Oncology will continue to follow with you      Case discussed with Dr. Leslie CASTLE  Oncology Physician Assistant  Althea DICKENS/KAREN [facility]  Pager [phone] also available on Teams    If before 8am/after 5pm or on weekends please page On-call Oncology Fellow       Pt is a 56 yo F with PMH pancreat CA (mets to L spine, L femur; Cong, chemo abraxane/gemzar 12/27, T2D, GERD, asthma, and HTN p/w LLE pain x 1mo with difficulty ambulating x1wk. Likely in the setting of known metastatic disease. Admitted for PT eval and pain control . Oncology consulted for further recommendations    MR T/L spine 12/12  Edema and abnormal enhancement involving the left superior articular   process of L4. Findings concerning for metastatic disease. Correlation   with nuclear medicine bone scan is recommended for further evaluation.  Mild spondylosis.  Findings suspicious for sigmoid wall thickening. Correlate clinically and   if further evaluation is warranted, a dedicated CT the abdomen and pelvis   is recommended.  Focal T2 signal hyperintensity in the posterior aspect of the left lung   field for which dedicated  CT of the chest is recommended for further evaluation.    CTAP 12/23  No gross evidence for recurrent or metastatic disease.  Focal patchy opacities in the superior segment of the left lower lobe not   seen previously and in the left upper lobe, raising concern for   infection. Previously visualized more diffuse groundglass opacity in the   left upper lobe is no longer identified. Please correlate clinically.   Trace left pleural fluid has decreased.    Nuclear Bone Scan 12/23:   IMPRESSION: Abnormal bone scan.  Abnormal foci in the left femoral neck and lumbar spine highly suspicious   for osseous metastases.  Osseous lesion in the left femoral neck places the patient at risk for   pathologic fracture.      Left Femur, hip, knee 12/30  IMPRESSION:  No acute fracture or dislocation.        Please get Palliative Care consult for pain management  Please get Radiation Oncology Consult for further eval and role for inpatient RT  Would also get Ortho consult as well  Monitor CBC daily, pt counts may go down in the setting of recent chemo  No plans for inpatient chemotherapy  -Rest of care as per primary team  -Patient to followup with  ([facility]) upon discharge  -C/w Supportive care, pain control, Nutrition, PT, DVT ppx  -Oncology will continue to follow with you      Case discussed with Dr. Leslie CASTLE  Oncology Physician Assistant  [facility] DICKENS/KAREN [facility]  Pager [phone] also available on Teams    If before 8am/after 5pm or on weekends please page On-call Oncology Fellow       Pt is a 58 yo F with PMH pancreat CA (mets to L spine, L femur; Cong, chemo abraxane/gemzar 12/27, T2D, GERD, asthma, and HTN p/w LLE pain x 1mo with difficulty ambulating x1wk. Likely in the setting of known metastatic disease. Admitted for PT eval and pain control . Oncology consulted for further recommendations    MR T/L spine 12/12  Edema and abnormal enhancement involving the left superior articular   process of L4. Findings concerning for metastatic disease. Correlation   with nuclear medicine bone scan is recommended for further evaluation.  Mild spondylosis.  Findings suspicious for sigmoid wall thickening. Correlate clinically and   if further evaluation is warranted, a dedicated CT the abdomen and pelvis   is recommended.  Focal T2 signal hyperintensity in the posterior aspect of the left lung   field for which dedicated  CT of the chest is recommended for further evaluation.    CTAP 12/23  No gross evidence for recurrent or metastatic disease.  Focal patchy opacities in the superior segment of the left lower lobe not   seen previously and in the left upper lobe, raising concern for   infection. Previously visualized more diffuse groundglass opacity in the   left upper lobe is no longer identified. Please correlate clinically.   Trace left pleural fluid has decreased.    Nuclear Bone Scan 12/23:   IMPRESSION: Abnormal bone scan.  Abnormal foci in the left femoral neck and lumbar spine highly suspicious   for osseous metastases.  Osseous lesion in the left femoral neck places the patient at risk for   pathologic fracture.      Left Femur, hip, knee 12/30  IMPRESSION:  No acute fracture or dislocation.        Please get Palliative Care consult for pain management  Please get Radiation Oncology Consult for further eval and role for inpatient RT  Would also get Ortho consult as well  Monitor CBC daily, pt counts may go down in the setting of recent chemo  No plans for inpatient chemotherapy  -Rest of care as per primary team  -Patient to followup with Dr. Lewis ([facility]) upon discharge  -C/w Supportive care, pain control, Nutrition, PT, DVT ppx  -Oncology will continue to follow with you      Case discussed with Dr. Leslie CASTLE  Oncology Physician Assistant  [facility] DICKENS/KAREN [facility]  Pager [phone] also available on Teams    If before 8am/after 5pm or on weekends please page On-call Oncology Fellow

## 2022-12-30 NOTE — CHART NOTE - NSCHARTNOTEFT_GEN_A_CORE
Patient Name: Mae LagunasBirth Date: 1965  Address: [address] [place] [id number]: Female  Rx Written	Rx Dispensed	Drug	Quantity	Days Supply	Prescriber Name	Prescriber Nan #	Payment Method	Dispenser  12/16/2022	12/19/2022	methadone hcl 5 mg tablet	15	30	Ora Lim P (MS)	[id number]	Medicaid	[facility]  12/08/2022	12/09/2022	oxycodone hcl (ir) 10 mg tab	60	15	Raphael, Abee P (MS)	[id number]	Medicaid	[facility].  09/22/2022	09/28/2022	oxycodone hcl (ir) 5 mg tablet	80	20	Leyda Braun)	[id number]	Medicaid	[facility].  08/18/2022	08/22/2022	oxycodone hcl (ir) 5 mg tablet	42	7	Raphael, Abee P (MS)	[id number]	Medicaid	[facility].  06/30/2022	07/01/2022	promethazine-codeine solution	473	12	Ab Raphaelee P (MS)	[id number]	[facility].  03/03/2022	03/04/2022	oxycodone-acetaminophen 5-325 mg tab	25	7	Dillon Bourne	[id number]	Medicaid	[facility].    Patient Name: Mae LagunasBirth Date: 1965  Address: [address] [place] [id number]: Female  Rx Written	Rx Dispensed	Drug	Quantity	Days Supply	Prescriber Name	Prescriber Nan #	Payment Method	Dispenser  09/27/2022	09/27/2022	promethazine-codeine solution	473ml	12	Jeremie Chisholm	[id number]	[facility]

## 2022-12-30 NOTE — H&P ADULT - PROBLEM SELECTOR PLAN 6
- home med: protonix 40mg daily, sucralfate 1g BID  - c/w protonix  - avoid NSAIDs given recent c/f gastritis

## 2022-12-30 NOTE — PATIENT PROFILE ADULT - FALL HARM RISK - HARM RISK INTERVENTIONS

## 2022-12-30 NOTE — H&P ADULT - PROBLEM SELECTOR PLAN 9
- F: none  - E: replete K<4, Mg<2  - N: regular diet  - D: lovenox 40mg q24h  - G: protonix 40mg daily    code: full -- discussed with pt and daughter bedside today; understanding of cancer diagnosis and metastatic disease causing pain and current admission; would like full escalation of care and advanced measures as indicated; daughter is HCP and filled out paperwork last admission; would like to be notified of any and all medical updates    dispo: pending medical optimization and PT eval

## 2022-12-30 NOTE — H&P ADULT - NSHPREVIEWOFSYSTEMS_GEN_ALL_CORE
CONSTITUTIONAL: No fevers or chills. +generalized weakness  EYES/ENT: No visual changes;  No vertigo or throat pain   NECK: No pain or stiffness  RESPIRATORY: No cough, wheezing, hemoptysis; No shortness of breath  CARDIOVASCULAR: No chest pain or palpitations  GASTROINTESTINAL: No abdominal or epigastric pain. No nausea, vomiting, or hematemesis; No diarrhea or constipation. No melena or hematochezia.  GENITOURINARY: No dysuria, frequency or hematuria  NEUROLOGICAL: No numbness or weakness  SKIN: No itching, burning, rashes, or lesions   All other review of systems is negative unless indicated above.

## 2022-12-30 NOTE — CONSULT NOTE ADULT - ASSESSMENT
58 yo F with PMH pancreat CA (mets to L spine, L femur; Cogn, chemo ambraxane/gemzar 12/27, radiation ?12/15), T2D, GERD, asthma, and HTN p/w LLE pain x 1mo with difficulty ambulating x1wk. Likely in the setting of known metastatic disease.  Palliative Care consulted for evaluation and management of pain/ symptoms

## 2022-12-30 NOTE — H&P ADULT - PROBLEM SELECTOR PLAN 1
- pt p/w 1mo progressive LLE pain and difficulty ambulating x1wk; likely in setting of known L femur metastatic disease; previously on 5mg oxycodone and increased to 10mg; f/w pain management and recently started on methadone 5mg in the last week without relief  - XR L knee/hip/pelvis/femur neg acute fx  - PT consulted, f/u recs  - pain control: tylenol (mild), oxy 10mg (moderate), morphine 15mg (severe); morphine 2mg IV for breakthrough  - consider additional radiation sessions for L femur lesion?   - adjust pain regimen PRN  - bowel regimen while on opioids

## 2022-12-30 NOTE — H&P ADULT - PROBLEM SELECTOR PLAN 4
- , AST 81, ALT 53  - ALP at baseline per chart review  - AST/ALT elevated from baseline -- pt denies toxic substances; likely in setting of poor PO intake   - continue to trend

## 2022-12-30 NOTE — CONSULT NOTE ADULT - SUBJECTIVE AND OBJECTIVE BOX
HPI:  Pt is a 58 yo F with PMH pancreat CA (mets to L spine, L femur; Cong, chemo ambraxane/gemzar 12/27, radiation ?12/15), T2D, GERD, asthma, and HTN p/w LLE pain x 1mo with difficulty ambulating x1wk. Recently admitted to [facility] 10/22-11/1 for abdominal pain, found to have gastritis and colitis likely in the setting of radiation with course c/b E coli UTI and PNA s/p abx. Pt requested daughter to provide majority of PMH and serve as . Pt with new LLE pain since Thanksgiving and had MRI early Dec showing metastatic disease to L spine and L femur. Underwent first session of radiation shortly after but has not yet scheduled repeat sessions. Had been on oxycodone 5mg which was increased to 10mg without relief. Was then referred to pain management who started methadone 5mg without relief. Appears that pt may have stopped oxycodone when started on methadone. Also with chronic poor appetite and poor PO intake. Denies any other complaints.     On arrival, T 97.7, HR 83, /60, RR 15 O2sat 100% RA. Labs with WBC 3.56, Hb 9.6, MCV 78, plt 135, , AST 81, ALT 53, RVP neg. XR L knee, L hip, pelvis, and L femur all neg acute fx. Pt given morphine 4mg IV and zofran 4mg IV. Admitted to medicine for further observation and management.  (30 Dec 2022 08:50)      Interval History:   Patient deferred [facility] ; preferred for daughter Olegario at bedside to translate  Patient with left thigh pain that is constant in nature with intermittent worsening episodes. Pain worse with movement; has been unable to ambulate secondary to pain. Pain at its worst is 10/10 and currently 7-8/10; pain is tingling in nature.   Patient had been taking prn oxycodone 5-10mg at home; also recently started about 10 days ago on methadone 2.5mg BID, and increased it yesterday to 5mg bid. Daughter states they had stopped gabapentin about 10 days ago as per recommendation from their outpatient doctor upon methadone initiation.   Denies constipation. Last bowel movement about 1 day ago.  Has intermittent nausea with emesis episodes which started since last cycle of treatment     PERTINENT PM/SXH:   Essential hypertension  Gastroesophageal reflux disease without esophagitis  Mild intermittent asthma without complication  Arthritis  Neuropathy  Type 2 diabetes mellitus without complication, without long-term current use of insulin  Adenocarcinoma of gallbladder  Cancer of ampulla of Vater  No significant past surgical history  H/O Whipple procedure  Hemorrhage of gastroduodenal artery  H/O drainage of abscess  H/O eye surgery  Admission for chemotherapy    FAMILY HISTORY:  Family history of diabetes mellitus (DM) (Sibling)  FH: HTN (hypertension) (Sibling)    ITEMS NOT CHECKED ARE NOT PRESENT    Family Hx substance abuse [ ]yes [x ]no    SOCIAL HISTORY:   Significant other/partner[x ]  Children[x ]  Amish/Spirituality:   Substance hx:  [ ]   Tobacco hx:  [ ]   Alcohol hx: [ ]   Home Opioid hx:  [ x] I-Stop Reference No:  [id number]  Others' Prescriptions  Patient Name: Mae Mcgregor Date: 1965  Address: [address] [place] [id number]: Female  Rx Written	Rx Dispensed	Drug	Quantity	Days Supply	Prescriber Name	Prescriber Nan #	Payment Method	Dispenser  12/16/2022	12/19/2022	methadone hcl 5 mg tablet	15	30	Ora Lim (MS)	[id number]	Medicaid	[employer], [place]  12/08/2022	12/09/2022	oxycodone hcl (ir) 10 mg tab	60	15	Ora Lim (MS)	[id number]	Medicaid	[employer], [place]  09/22/2022	09/28/2022	oxycodone hcl (ir) 5 mg tablet	80	20	Leyda Braun)	[id number]	Medicaid	[employer], [place]  08/18/2022	08/22/2022	oxycodone hcl (ir) 5 mg tablet	42	7	Ora Lim (MS)	[id number]	Medicaid	[employer], [place]  06/30/2022	07/01/2022	promethazine-codeine solution	473	12	Ora Lim (MS)	[id number]	[facility] [employer] [place]  03/03/2022	03/04/2022	oxycodone-acetaminophen 5-325 mg tab	25	7	Dillon Bourne	[id number]	Medicaid	[employer], [facility].    Patient Name: Mae Mcgregor Date: 1965  Address: [address] [place] [id number]: Female  Rx Written	Rx Dispensed	Drug	Quantity	Days Supply	Prescriber Name	Prescriber Nan #	Payment Method	Dispenser  09/27/2022	09/27/2022	promethazine-codeine solution	473ml	12	Jeremie Chisholm	[id number]	[facility]  Living Situation: [ x]Home  [ ]Long term care  [ ]Rehab [ ]Other        ADVANCE DIRECTIVES:    MOLST  [ ]  Living Will  [ ]   DECISION MAKER(s):  [x ] Health Care Proxy(s)  [ ] Surrogate(s)  [ ] Guardian           Name(s): Phone Number(s):  Primary HCP Cintia Lagunas: [phone]  Alternate HCP: Shane Lagunas:     BASELINE (I)ADL(s) (prior to admission):  Mason: [ ]Total  [ x] Moderate [ ]Dependent    Allergies    No Known Allergies    Intolerances    MEDICATIONS  (STANDING):  bisacodyl 5 milliGRAM(s) Oral at bedtime  cholecalciferol 400 Unit(s) Oral daily  dextrose 5%. 1000 milliLiter(s) (50 mL/Hr) IV Continuous <Continuous>  dextrose 5%. 1000 milliLiter(s) (100 mL/Hr) IV Continuous <Continuous>  dextrose 50% Injectable 25 Gram(s) IV Push once  dextrose 50% Injectable 12.5 Gram(s) IV Push once  dextrose 50% Injectable 25 Gram(s) IV Push once  enoxaparin Injectable 40 milliGRAM(s) SubCutaneous every 24 hours  gabapentin 300 milliGRAM(s) Oral two times a day  glucagon  Injectable 1 milliGRAM(s) IntraMuscular once  influenza   Vaccine 0.5 milliLiter(s) IntraMuscular once  insulin lispro (ADMELOG) corrective regimen sliding scale   SubCutaneous three times a day before meals  insulin lispro (ADMELOG) corrective regimen sliding scale   SubCutaneous at bedtime  melatonin 3 milliGRAM(s) Oral at bedtime  methadone    Tablet 2.5 milliGRAM(s) Oral two times a day  montelukast 10 milliGRAM(s) Oral at bedtime  multivitamin 1 Tablet(s) Oral daily  pantoprazole    Tablet 40 milliGRAM(s) Oral before breakfast  polyethylene glycol 3350 17 Gram(s) Oral daily    MEDICATIONS  (PRN):  acetaminophen     Tablet .. 650 milliGRAM(s) Oral every 6 hours PRN Temp greater or equal to 38C (100.4F), Mild Pain (1 - 3)  dextrose Oral Gel 15 Gram(s) Oral once PRN Blood Glucose LESS THAN 70 milliGRAM(s)/deciliter  ondansetron Injectable 4 milliGRAM(s) IV Push every 8 hours PRN Nausea and/or Vomiting  oxyCODONE    IR 10 milliGRAM(s) Oral every 6 hours PRN Moderate Pain (4 - 6)  oxyCODONE    IR 15 milliGRAM(s) Oral every 6 hours PRN Severe Pain (7 - 10)      ITEMS UNCHECKED ARE NOT PRESENT     PRESENT SYMPTOMS: [ ]Unable to self-report due to altered mental status  [ ] CPOT [ ] PAINADs [ ] RDOS  Source if other than patient:  [ ]Family   [ ]Team     Pain: [ x]yes [ ]no  QOL impact - severe  Location - left thigh                     Aggravating factors - movement   Quality - tingling   Radiation - left lower extremity and lower back  Timing- constant with intermittent worsening episodes   Severity (0-10 scale): 10  Minimal acceptable level (0-10 scale):  7-8    CPOT:    https://www.sccm.org/getattachment/kie42c80-6m0k-3q1n-8z5q-4578u2183v5e/Critical-Care-Pain-Observation-Tool-(CPOT)    Dyspnea:                           [ ]Mild [ ]Moderate [ ]Severe  Anxiety:                             [ ]Mild [ ]Moderate [ ]Severe  Agitation:                          [ ]Mild [ ]Moderate [ ]Severe  Fatigue:                             [ ]Mild [ ]Moderate [ ]Severe  Nausea:                             [x ]Mild [ ]Moderate [ ]Severe  Loss of appetite:              [ ]Mild [ ]Moderate [ ]Severe  Constipation:                   [ ]Mild [ ]Moderate [ ]Severe  Diarrhea:                          [ ]Mild [ ]Moderate [ ]Severe      PCSSQ[Palliative Care Spiritual Screening Question]   Severity (0-10):  Score of 4 or > indicate consideration of Chaplaincy referral.  Chaplaincy Referral: [ ] yes [ ] refused [ ] following [ x] deferred    Caregiver [place]? : [ ] yes [ ] no [ ] Declined   [x ] Deferred            Social work referral [ ] Patient & Family Centered Care Referral [ ]     Anticipatory Grief present?:  [ ] yes [ ] no  [x ] Deferred                  Social work referral [ ] Chaplaincy Referral[ ]    Other Symptoms:  [x ]All other review of systems negative     PHYSICAL EXAM:  Vital Signs Last 24 Hrs  T(C): 36.6 (30 Dec 2022 14:00), Max: 37.3 (30 Dec 2022 05:50)  T(F): 97.9 (30 Dec 2022 14:00), Max: 99.2 (30 Dec 2022 05:50)  HR: 72 (30 Dec 2022 14:00) (68 - 87)  BP: 101/61 (30 Dec 2022 14:00) (99/54 - 127/60)  BP(mean): --  RR: 16 (30 Dec 2022 14:00) (15 - 17)  SpO2: 98% (30 Dec 2022 14:00) (98% - 100%)    Parameters below as of 30 Dec 2022 14:00  Patient On (Oxygen Delivery Method): room air    I&O's Summary      GENERAL:  [x ]Alert  [x ]Oriented x 3  [ ]Lethargic  [ ]Cachexia  [ ]Unarousable  [x ]Verbal  [ ]Non-Verbal    Behavioral:   [ ] Anxiety  [ ] Delirium [ ] Agitation [ x] Calm  [ ] Other  HEENT:  [ x]Normal  [ ] Temporal Wasting  [ ]Dry mouth   [ ]ET Tube/Trach  [ ]Oral lesions  [ ] Mucositis  PULMONARY:   [ x]Clear [ ]Tachypnea  [ ]Audible excessive secretions   [ ]Rhonchi        [ ]Right [ ]Left [ ]Bilateral  [ ]Crackles        [ ]Right [ ]Left [ ]Bilateral  [ ]Wheezing     [ ]Right [ ]Left [ ]Bilateral  [ ]Diminished breath sounds [ ]right [ ]left [ ]bilateral  CARDIOVASCULAR:    [ x]Regular [ ]Irregular [ ]Tachy  [ ]Dmitry [ ]Murmur [ ]Other  GASTROINTESTINAL:  [ x]Soft  [ ]Distended   [ ]+BS  [x ]Non tender [ ]Tender  [ ]PEG [ ]OGT/ NGT  Last BM: 1 day ago per patient   GENITOURINARY:  [x ]Normal [ ] Incontinent   [ ]Oliguria/Anuria   [ ][place]  MUSCULOSKELETAL:   [x ]Normal , except left lower extremity movement limited due to pain  [ ]Weakness  [ ]Bed/Wheelchair bound [ ]Edema  [  ] amputation  [  ] contraction  NEUROLOGIC:   [ x]No focal deficits  [ ]Cognitive impairment  [ ]Dysphagia [ ]Dysarthria [ ]Paresis [ ]Other   SKIN: See Nursing Skin Assessment for further details  [x ]Normal    [ ]Rash  [ ]Pressure ulcer(s)       Present on admission [ ]y [ ]n   [  ]  Wound    [  ] hyperpigmentation    CRITICAL CARE:  [ ] Shock Present  [ ]Septic [ ]Cardiogenic [ ]Neurologic [ ]Hypovolemic  [ ]  Vasopressors [ ]  Inotropes   [ ]Respiratory failure present [ ]Mechanical ventilation [ ]Non-invasive ventilatory support [ ]High flow    [ ]Acute  [ ]Chronic [ ]Hypoxic  [ ]Hypercarbic [ ]Other  [ ]Other organ failure     LABS:                        9.6    3.56  )-----------( 135      ( 30 Dec 2022 01:26 )             30.1   12-30    135  |  98  |  8   ----------------------------<  128<H>  3.9   |  26  |  0.47<L>    Ca    9.6      30 Dec 2022 01:26    TPro  7.8  /  Alb  3.9  /  TBili  1.1  /  DBili  x   /  AST  81<H>  /  ALT  53<H>  /  AlkPhos  217<H>  12-30      CAPILLARY BLOOD GLUCOSE      POCT Blood Glucose.: 172 mg/dL (30 Dec 2022 12:11)  POCT Blood Glucose.: 153 mg/dL (29 Dec 2022 22:45)      RADIOLOGY & ADDITIONAL STUDIES:  XRAY Hip/Pelvis, Left Femur 12/30/2022  No acute fracture or dislocation.    Bone Scan 12/23/2022  IMPRESSION: Abnormal bone scan.  Abnormal foci in the left femoral neck and lumbar spine highly suspicious   for osseous metastases.  Osseous lesion in the left femoral neck places the patient at risk for   pathologic fracture    CT Chest Abd/ Pelvis 12/23/2022  No gross evidence for recurrent or metastatic disease.  Focal patchy opacities in the superior segment of the left lower lobe not   seen previously and in the left upper lobe, raising concern for   infection. Previously visualized more diffuse groundglass opacity in the   left upper lobe is no longer identified.     PROTEIN CALORIE MALNUTRITION PRESENT: [ ]mild [ ]moderate [ ]severe [ ]underweight [ ]morbid obesity  https://www.andeal.org/vault/8330/web/files/ONC/Table_Clinical%20Characteristics%20to%20Document%20Malnutrition-White%20JV%20et%20al%202012.pdf    Height (cm): 149 (12-29-22 @ 22:41), 149 (12-27-22 @ 16:21), 147.3 (10-22-22 @ 08:57)  Weight (kg): 43.5 (12-27-22 @ 16:21), 47 (10-22-22 @ 08:57), 45.9 (07-20-22 @ 11:51)  BMI (kg/m2): 19.6 (12-29-22 @ 22:41), 19.6 (12-27-22 @ 16:21), 21.7 (10-22-22 @ 08:57)    [ ]PPSV2 < or = to 30% [ ]significant weight loss  [ ]poor nutritional intake  [ ]anasarca [ ]Artificial Nutrition      REFERRALS:   [ ]Chaplaincy  [ ]Hospice  [ ]Child Life  [ ]Social Work  [ x]Case management [ ]Holistic Therapy

## 2022-12-30 NOTE — PATIENT PROFILE ADULT - FUNCTIONAL ASSESSMENT - DAILY ACTIVITY 4.
COLUMBIA-SUICIDE SEVERITY RATING SCALE     In the Past Month   Yes \ No      1) Have you wished you were dead or wished you could go to sleep and not wake up?       No      2) Have you actually had any thoughts about killing yourself?      No     In the Past 3 Months       6) Have you done anything, started to do anything, or prepared to do anything to end your life?       No      3 = A little assistance

## 2022-12-30 NOTE — H&P ADULT - NSHPPHYSICALEXAM_GEN_ALL_CORE
VITAL SIGNS:  T(C): 36.6 (12-30-22 @ 10:04), Max: 37.3 (12-30-22 @ 05:50)  T(F): 97.8 (12-30-22 @ 10:04), Max: 99.2 (12-30-22 @ 05:50)  HR: 72 (12-30-22 @ 10:04) (68 - 87)  BP: 99/54 (12-30-22 @ 10:04) (99/54 - 127/60)  BP(mean): --  RR: 17 (12-30-22 @ 10:04) (15 - 17)  SpO2: 100% (12-30-22 @ 10:04) (99% - 100%)  Wt(kg): --    PHYSICAL EXAM:  Constitutional: thin and frail F, looks older than stated age, appears fatigued  Head: NC/AT  Eyes: PERRL, EOMI, anicteric sclera  ENT: no nasal discharge; MMM  Neck: supple; no JVD  Respiratory: CTA B/L; no W/R/R; on RA without respiratory distress  Cardiac: +S1/S2; RRR; no M/R/G  Gastrointestinal: soft, NT/ND; no rebound or guarding; +BSx4  Extremities: WWP, no clubbing or cyanosis; no peripheral edema  Musculoskeletal: mm strength 5/5 BL UE, 5/5 RLE, 3/5 LLE limited by pain (unable to lift off bed); L thigh TTP  Vascular: 2+ radial, DP/PT pulses B/L  Dermatologic: skin warm, dry and intact; no rashes, wounds, or scars  Neurologic: AAOx3; CNII-XII grossly intact; no focal deficits  Psychiatric: affect and characteristics of appearance, verbalizations, behaviors are appropriate

## 2022-12-30 NOTE — PATIENT PROFILE ADULT - NSPRONUTRITIONRISK_GEN_A_NUR
Occupational Therapy Evaluation      Dhavaldomingo Licona    9/28/2022    Patient Active Problem List   Diagnosis    Seizures ([place] [address] )    Restless leg syndrome    Hypertension    Hyperlipidemia    Diabetes mellitus (HCC)    GERD (gastroesophageal reflux disease)    Epigastric abdominal pain    Encounter for examination and observation for other specified reasons    Severe episode of recurrent major depressive disorder, without psychotic features ([place] [address] )    Gambling disorder, episodic, moderate    Suspected COVID-19 virus infection    Major depressive disorder    Acquired hypothyroidism    Diarrhea    Asymptomatic bacteriuria    Chronic fatigue and malaise    Calf tenderness    Morbid obesity due to excess calories ([facility])    Onychomycosis of toenail    Orthostatic hypotension    Primary osteoarthritis of one hip, right    Dyspnea on exertion    Chronic heart failure with preserved ejection fraction (HCC)    Chronic respiratory failure ([place] [address] )    Coronary artery disease involving native coronary artery of native heart without angina pectoris    Syncope    Elevated d-dimer    Weakness       Past Medical History:   Diagnosis Date    CHF (congestive heart failure) ([place] [address] )     Diabetes mellitus ([place] [address] )     Disease of thyroid gland     Hyperlipidemia     Hypertension     Hypothyroidism (acquired)     Restless leg syndrome     Seizures ([place] [address] )        Past Surgical History:   Procedure Laterality Date    APPENDECTOMY      CHOLECYSTECTOMY      HIP FRACTURE SURGERY Right     TONSILLECTOMY      TUBAL LIGATION          09/28/22 1037 09/28/22 1200   OT Last Visit   OT Visit Date 09/28/22  --    Note Type   Note type Evaluation  --    Restrictions/Precautions   Weight Bearing Precautions Per Order No  --    Other Precautions Multiple lines; Chair Alarm;O2;Fall Risk;Bed Alarm  (2L O2 via NC, worn at baseline)  --    Pain Assessment   Pain Assessment Tool 0-10  --    Pain Score No Pain  --    Home Living   Type of Home Apartment  --    Home Layout One level;Performs ADLs on one level; Able to live on main level with bedroom/bathroom; Access; Elevator  ((0 JOSE, elevator to apartment  ))  --    [employer]  (pt reports having tub "made into" a walk in shower)  --    [employer] Grab bars in shower; Shower chair;Hand-held shower  (used to have TTB, has shower chair now )  --    [address] Cane;Walker;Sock aid;Life alert;Grab bars  ((rollator used for community mobility, no AD within the home  Pt endorses furniture surfing)  --    Additional Comments Laundry in basement (accessible via elevator) pt reports putting laundry basket onto rollator and pushing downstairs  --    Prior Function   Level of [place] Needs assistance with IADLs; Needs assistance with ADLs and functional mobility  ((I) functional mobility)  --    Lives With (S)  Alone  --    Receives Help From Home health;Neighbor  (HHPT per pt, HHA 2x/weel for 2hr/each)  --    ADL Assistance Needs assistance  ((I) spongebathes , A to get into shower  Mod (I) c LHAE for LB dressing))  --    IADLs Needs assistance  ((I) med managmenet, (I) laundry  A c cleaning , cooking, home management  [employer] for transportation)  --    Falls in the last 6 months 1 to 4  (1 syncope event leading to previous admission, denies others)  --    Vocational Unemployed  --    Comments (-) driving, pt utilizes [employer] and has delivery groceries  HHA assits with home management, meals  Pt reports spongebathing (I), HHA assists c showering 2x/week  Wears 2L O2 at baseline, however reports "I haven't worn in a few weeks because I felt like I didn't need it anymore"  Reports having home PT  Of note, pt with recent D/C from [facility] 9/22 home, reports unable to get OOB , d/t weakness, diarrhea   Pt reports was unable to reach life alert (dropped behind bed) Pt now interested in STR  --    Lifestyle   Autonomy PTA, pt reports being mod (I) c rollator, A c ADLs and IADLs  Lives alone in 1 story apartment c elevator access  Utilizes Eliecer Arzola, (+) HHA, (+) HHPT  --    Reciprocal Relationships children  --    Service to Others unemployed  --    ADL   Eating Assistance 5  Supervision/Setup  --    Grooming Assistance 5  Supervision/Setup  --     [address] 4  Minimal Assistance  --    LB Bathing Assistance 3  Moderate Assistance  --    [address] [facility] 4  Minimal Assistance  --    LB Dressing Assistance 2  Maximal Assistance  --    [address]  3  Moderate Assistance  --    Toileting Deficit Perineal hygiene;Grab bar use;Bedside commode; Increased time to complete  --    Functional Assistance 4  Minimal Assistance  --    Additional Comments Pt limited by (+) orthostatic hypotension, generalized weakness, decreased activity tolerance, funcitonal reach for LB ADLs limited by body habitus  --    Bed Mobility   Supine to Sit 4  Minimal assistance  --    Additional items Assist x 1; Increased time required;LE management;Verbal cues  --    Sit to Supine   (DNT: pt seated OOB in recliner at end of session)  --    Transfers   Sit to Stand 3  Moderate assistance  --    Additional items Assist x 1; Increased time required;Verbal cues  --    Stand to Sit 3  Moderate assistance  --    Additional items Assist x 1;Verbal cues; Increased time required  --    Stand pivot 3  Moderate assistance  --    Additional items Assist x 1; Impulsive; Increased time required  (RW)  --    Additional Comments completed orthostatic BPs at EOB, significant findings with report of progressive dizziness/lightheadedness  Supine: 110/59: sittin/76, sitting 3 min 91/5, sitting 5 min 84/57, unable to complete standing d/t worsening dizziness and low BP, completed chair swap BSC > recliner to place pt in semisupine position, BP improved 121/75   RN and [address] made aware  (Ambulating transfer competed bed>recliner c RW, cues for hand placements and trunk control d/t poor proximity)  -- Functional Mobility   Functional Mobility   (TINA safely d/t (+) orthostatic BP)  --    Balance   Static Sitting Fair +  --    Dynamic Sitting Fair  --    Static Standing Poor +  --    Dynamic Standing Poor  --    Activity Tolerance   Activity Tolerance Other (Comment)  ((+) orthostatic hypotension)  --    Medical Staff Made Aware PT, SLIM CRNP, CM  --    Nurse Made Aware BETHANY Sanchez pre/post session  --    RUE Assessment   RUE Assessment WFL  (MMT grossly 4/5 based on functional assessment)  --    LUE Assessment   LUE Assessment WFL  (MMT grossly 4/5 based on functional assessment)  --    Hand Function   Gross Motor Coordination Functional  --    Fine Motor Coordination Functional  --    Sensation   Light Touch No apparent deficits  --    Cognition   Overall Cognitive Status WFL  --    Arousal/Participation Alert  --    Attention Within functional limits  --    Orientation Level Oriented X4  (increased time to recall date)  --    Memory Within functional limits  --    Following Commands Follows multistep commands with increased time or repetition  --    Comments pt with increased anxity, varying moods thorughout session  Intermittently irritable, also intermittently tearful  --    Assessment   Limitation Decreased ADL status; Decreased UE strength;Decreased Safe judgement during ADL;Decreased endurance;Decreased self-care trans;Decreased high-level ADLs  --    Prognosis Good  --    Assessment Patient is a 80 y o  female seen for OT evaluation at [place] [id number] following admission on 9/27/2022  s/p Weakness  Comorbidities and significant PMHx impacting functional performance include: DM, GERD, MDD, acquired hypothyroidism, morbid obesity d/t excess calories, chronic respiratory failure  Patient presents with active orders for OT eval and treat and up and OOB as tolerated   Performed at least 2 patient identifiers during session including name and wristband  PTA, pt reports being mod (I) c rollator, A c ADLs and IADLs  Lives alone in 1 story apartment c elevator access  Utilizes Michelle Jenkins, (+) HHA, (+) HHPT Upon initial evaluation, patient requires min assistance for UB ADLs, max assistance for LB ADLs, and mod assistance for transfers with RW  Based on functional eval, pt presents with intact  attention, intact  safety awareness, intact  problem solving skills, and impaired  recall of recent events  Occupational performance is affected by the following deficits:  decreased muscular strength , acute change in mobility status , decreased standing tolerance for self care tasks , decreased dynamic balance impacting functional reach, decreased trunk control , decreased activity tolerance  and decreased emotional regulation and coping skills   Functional performance is further limited by (+) orthostatic hypotension  Based on these findings, functional performance deficits, and medical complexity pt has been identified as a high complexity evaluation  Personal factors impacting performance include: decreased (+) Hx of falls , decreased caregiver status , steps to enter home, decreased ADL independence , decreased IADL independence and High fall risk   Patient would benefit from OT services within the acute care setting to maximize level of functional independence in the following occupational areas bathing/showering, toileting, dressing , personal hygiene/grooming , bed mobility , functional mobility, transfer to all surfaces, health management , activity engagement , safety procedures , fall prevention  and energy conservation   From OT standpoint, recommendation at time of D/C would be post-acute rehabilitation   --    Goals   Patient Goals to attend rehab  --    Plan   Treatment Interventions ADL retraining;Functional transfer training;UE strengthening/ROM; Endurance training;Patient/family training;Equipment evaluation/education; Activityengagement; Energy conservation  --    Goal Expiration Date 10/08/22  --    OT Treatment Day 0  --    OT Frequency 3-5x/wk  --    Recommendation   OT Discharge Recommendation Post acute rehabilitation services  --    Additional Comments  Pt resting OOB in recliner at end of session c all needs met, call button in reach and chair alarm activated  Encouraged OOB mobility to [facility] c nursing staff, permitting BPs  --    Additional Comments 2 The patient's raw score on the AM-PAC Daily Activity inpatient short form is 14, standardized score is 33 39, less than 39 4  Patients at this level are likely to benefit from discharge to post-acute rehabilitation services  Please refer to the recommendation of the Occupational Therapist for safe discharge planning  --    AM-[facility] Daily Activity Inpatient   Lower Body Dressing 1 1   Bathing 2 2   Toileting 2 2   Upper Body Dressing 3 3   Grooming 3 3   Eating 3 3   Daily Activity Raw Score 14 14   Daily Activity Standardized Score (Calc for Raw Score >=11) 33 39 33 39   AM-PAC Applied Cognition Inpatient   Following a Speech/Presentation 3 3   Understanding Ordinary Conversation 4 4   Taking Medications 3 3   Remembering Where Things Are Placed or Put Away 4 4   Remembering List of 4-5 Errands 4 4   Taking Care of Complicated Tasks 3 3   Applied Cognition Raw Score 21 21   Applied Cognition Standardized Score 44 3 44 3   Pt will complete UB ADLs Independent  as needed for increased ADL independence within 10 days  Pt will complete LB ADLs Min A  with use of LHAE as needed for increased ADL independence within 10 days  Pt will verbalize and demonstrate understanding in use of compensatory techniques and use of long handled AE for increased safety and independence during LB ADL tasks  Pt will complete toileting Min A  with use of DME for increased ADL independence within 10 days       Pt will demonstrate proper body mechanics to complete self-care transfers and functional mobility with Min A and use of AD PRN for increased safety and functional independence within 10 days  Pt will demonstrate standing tolerance of 4 min min with Min A and use of AD PRN for increased activity tolerance during ADL/IADL tasks within 10 days  Pt will demonstrate proper body mechanics and fall prevention strategies during 100% of tx sessions for increased safety awareness during ADL/IADLs    Pt will demonstrate OOB sitting tolerance of 2-4 hr/day for increased activity tolerance and engagement in self care tasks within 10 days  Pt will verbalize and demonstrate understanding of energy conservation/deep breathing techniques for increased activity tolerance and endurance during meaningful activities       Virgie Rodriguez No indicators present

## 2022-12-30 NOTE — ED ADULT NURSE NOTE - NS ED NURSE PATIENT LEFT UNIT TIME
No new care gaps identified.  [facility] Embedded Care Gaps. Reference number: [id number]. 12/22/2022   10:39:11 PM CST   06:27

## 2022-12-30 NOTE — CONSULT NOTE ADULT - ASSESSMENT
Pt is a 56 yo F with PMH of  T2D, GERD, asthma, and HTN, and pancreatic adenocarcinoma s/p [place] 2019 w/ mets to lung which was treated with RT 45Gy/15fx by Dr. Lee completed on 10/14/2022. She then found to have mets to L spine, L femur despite she is on chemo. Now she was admitted LLE pain x 1mo with difficulty ambulating x1wk due to pain. No recent falls or trauma. Patient denies numbness or tingling in the LLE.    On exam, She's complaining about pain on left groin which radiates to LLE, She initially had lower back pain but improved after admission with pain medication. She complains pain on thoracic spine instead today.      Plan:  -Continue pain management  -Ortho consult to assess fracture risk and if there is a role of surgical intervention  -Xray and MRI of pelvis are awaited  -Will re-eval after results available

## 2022-12-30 NOTE — ED PROVIDER NOTE - PHYSICAL EXAMINATION
GENERAL: Awake. Alert. NAD. Well nourished.  HEENT: NC/AT, Conjunctiva pink, no scleral icterus. Airway patent. Moist mucous membranes.  LUNGS: CTAB. No wheezes or rales noted.  CARDIAC: Chest non-tender to palpation. RRR.  ABDOMEN: No masses noted. Soft, NT, ND, no rebound, no guarding.  BACK: No midline spinal tenderness  EXT: No edema, no calf tenderness, distal pulses 2+ bilaterally  NEURO: A&Ox3. Moving all extremities. Sensation and strength intact throughout. No focal deficits.   SKIN: Warm and dry.   PSYCH: Normal affect.

## 2022-12-30 NOTE — ED ADULT NURSE NOTE - OBJECTIVE STATEMENT
58 y/o F presents to ED room 25 A&Ox4 c/o LLE pain x 3 months. in the last week, pt unable to walk on leg. sharp, "muscle tightness" in thigh. PMHx of pancreatic CA. 56 y/o F presents to ED room 25 A&Ox4 c/o LLE pain x 3 months. in the last week, pt unable to walk on leg. sharp, "muscle tightness" in thigh. PMHx of pancreatic CA. recent MRI shows metastatic to L femur and spine. pt prescribed oxycodone and methadone, pt underdosing herself at home. respirations even and unlabored. skin dry and intact. denies c/p, SOB, HA, N/V/D. 20G to LAC, labs drawn and sent. medicated as per MD orders. awaiting Xray. safety maintained. son at bedside translating.

## 2022-12-30 NOTE — H&P ADULT - PROBLEM SELECTOR PLAN 2
- pt with hx pancreatic CA f/w Dr. Katty Navarro @ [facility]; known mets to L spine and L femur, on chemo (ambraxane and gemzar) last dose 12/27 and started radiation to L spine early December x1 session   - will consult heme-onc -- reached out to Dr. Cronin today; pt may benefit from additional radiation sessions

## 2022-12-30 NOTE — CONSULT NOTE ADULT - SUBJECTIVE AND OBJECTIVE BOX
Patient is a 57y old  Female who presents with a chief complaint of Difficulty ambulating (30 Dec 2022 08:50)      HPI:  Pt is a 58 yo F with PMH pancreat CA (mets to L spine, L femur; Cong, chemo ambraxane/gemzar 12/27,  T2D, GERD, asthma, and HTN p/w LLE pain x 1mo with difficulty ambulating x1wk. Recently admitted to [facility] 10/22-11/1 for abdominal pain, found to have gastritis and colitis likely in the setting of radiation with course c/b E coli UTI and PNA s/p abx. Pt requested daughter Olegario  to provide majority of PMH and serve as . Pt with new LLE pain since Thanksgiving and had MRI early Dec showing metastatic disease to L spine and L femur.     As per patient and dtr have not gotten any radiation to her L thigh. Had been on oxycodone 5mg which was increased to 10mg without relief.  Was then referred to pain management who started methadone 5mg without relief. She is well known to outpatient Palliative Care team Dr Solis Mayes.  Appears that pt may have stopped oxycodone when started on methadone. Also with chronic poor appetite and poor PO intake. Denies any other complaints.     On arrival, T 97.7, HR 83, /60, RR 15 O2sat 100% RA. Labs with WBC 3.56, Hb 9.6, MCV 78, plt 135, , AST 81, ALT 53, RVP neg. XR L knee, L hip, pelvis, and L femur all neg acute fx. Pt given morphine 4mg IV and zofran 4mg IV. Admitted to medicine for further observation and management.  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    Initially was admitted to hospitals on 12/18/18 with dizziness, found to have elevated LFTs. MRCP demonstrated a dilatation of the CBD. ERCP on 12/22/18 performed c/w ampullary mass with obstruction. Pathology c/w adenocarcinoma. Pt was discharged for out pt follow up with surg onc and med onc but was re-admitted on 1/5/19 with n/v, worsening abdominal pain, found to have severe sepsis with E coli bacteremia 2/2 acute cholangitis.    1/5/19: CT A/P Heterogeneity of the right hepatic lobe with questionable 1.3 cm hypoattenuating lesion in segment 7.  1/7/19: ERCP performed. Stent was removed from the biliary tree. One plastic stent placed  1/8/19: MRI Abd: No suspicious liver lesions. No abnormality is identified to correspond with questionable lesion identified on prior CT.  1/11/19: S/p ex-lap, Whipple pancreaticoduodenectomy for duodenal adenocarcinoma. - T3N1b  1/18-1/20/19: Developed hemorrhagic shock, drop in H/H to 6/19 lactate 9. Taken to the OR for evacuation of hematoma, cessation of gastroduodenal artery bleeding with two hemostatic stitches, and placement of abthera VAC. Required pressors in the SICU, then developed fever.  1/21-1/22/19: OR exploratory laparotomy, drainage of abdominal abscess, repair of abdominal wall hernia with Strattice mesh, b/ abdominal wall advancement flaps. Post-op patient remained intubated and off vasopressors. FENA calculated to be pre-renal. 500 CC albumin bolus given, urine output improved. 1/22/19 attempted spontaneous breathing trials but pt hypoxic. Pt lactate uptrend, resuscitated w/IVF, and pt hypotensive requiring pressors. UCx growing candida, Diflucan changed to micafungin to r/o resistant organisms in the setting of worsening sepsis.  1/23/19: patient noted to have blood oozing from midline incision while hypotensive and on pressors. Peak pressures noted to be elevated on vent and abdomen increasingly distended. All staples removed and a large amount of clot evacuated from midline incision. 1U PRBC given. No active bleeding appreciated. Fascia remains closed. Wound packed with wet to dry dressing. Vitamin K given for elevated INR.  1/24-1/29/19: remained in SICU, started on TPN, diuresed, finally extubated.  2/1/19: septic shock again, pressors restarted  2/2-2/11/19: CT scan with peritonitis, new abscesses. Ab and Antifungals started. Wound with increase in drainage. Octreotide started for increased output  2/12/19: drainage of abscesses by IR.  2/18/19: Wound was partially closed by plastics with interrupted sutures and an ostomy appliance was placed, VAC removed.  3/1/19. d/c home  4/30-10/21/19: s/p 8 cycles Gemzar/Xeloda chemotherapy  10/10/19: CT CAP: no evidence of mets  1/20-11/2020: prolonged stay in [facility] due to COVID  11/6/20: CT CAP: 2 mildly enlarged mesenteric LN, attention in f/u  5/19/21: CT CAP revealed stable exam, unchanged cardiophrenic and mesenteric root lymph nodes  12/7/21: CT CAP: stable disease  1/21/22: ventral hernia repair  3/10/22: CT abd/pel: significant interval improvement of previously described rectus/anterior abdominal wall hematoma, previously described focal hematoma at the left anterior abdominal wall is decreased in size  7/1/22: PET/CT: Masslike consolidation in the anterior left upper lung with intense uptake and left paratracheal lymph node, small focus of uptake in the right hepatic lobe near the severino hepatis suspicious for metastatic lesion.  7/19-7/22/22: [facility] admission s/p bronchoscopy 7/20/22; 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)  8/8-8/22/22: [id number] Gemzar/Abraxane  9/6/22: tx held (ANC 0.82)  9/13-9/27/22: [id number] Gemzar/Abraxane (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)  10/12/22: completed 15/15 fx to L Lung  10/22/22-11/1/22: [facility] admission due to sepsis with UTI and post obstructive pna  11/29/22: C1 Gemzar.  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 1000 milliLiter(s) (50 mL/Hr) IV Continuous <Continuous>  dextrose 5%. 1000 milliLiter(s) (100 mL/Hr) IV Continuous <Continuous>  dextrose 50% Injectable 25 Gram(s) IV Push once  dextrose 50% Injectable 12.5 Gram(s) IV Push once  dextrose 50% Injectable 25 Gram(s) IV Push once  enoxaparin Injectable 40 milliGRAM(s) SubCutaneous every 24 hours  gabapentin 300 milliGRAM(s) Oral two times a day  glucagon  Injectable 1 milliGRAM(s) IntraMuscular once  influenza   Vaccine 0.5 milliLiter(s) IntraMuscular once  insulin lispro (ADMELOG) corrective regimen sliding scale   SubCutaneous three times a day before meals  insulin lispro (ADMELOG) corrective regimen sliding scale   SubCutaneous at bedtime  melatonin 3 milliGRAM(s) Oral at bedtime  montelukast 10 milliGRAM(s) Oral at bedtime  multivitamin 1 Tablet(s) Oral daily  pantoprazole    Tablet 40 milliGRAM(s) Oral before breakfast  polyethylene glycol 3350 17 Gram(s) Oral daily    MEDICATIONS  (PRN):  acetaminophen     Tablet .. 650 milliGRAM(s) Oral every 6 hours PRN Temp greater or equal to 38C (100.4F), Mild Pain (1 - 3)  dextrose Oral Gel 15 Gram(s) Oral once PRN Blood Glucose LESS THAN 70 milliGRAM(s)/deciliter  ondansetron Injectable 4 milliGRAM(s) IV Push every 8 hours PRN Nausea and/or Vomiting      Allergies    No Known Allergies    Intolerances        Vital Signs Last 24 Hrs  T(C): 36.6 (30 Dec 2022 10:04), Max: 37.3 (30 Dec 2022 05:50)  T(F): 97.8 (30 Dec 2022 10:04), Max: 99.2 (30 Dec 2022 05:50)  HR: 72 (30 Dec 2022 10:04) (68 - 87)  BP: 99/54 (30 Dec 2022 10:04) (99/54 - 127/60)  BP(mean): --  RR: 17 (30 Dec 2022 10:04) (15 - 17)  SpO2: 100% (30 Dec 2022 10:04) (99% - 100%)    Parameters below as of 30 Dec 2022 10:04  Patient On (Oxygen Delivery Method): room air        PHYSICAL EXAM  PHYSICAL EXAM:  Constitutional: thin and frail F, looks older than stated age, appears fatigued  Head: NC/AT  Eyes: PERRL, EOMI, anicteric sclera  ENT: no nasal discharge; MMM  Neck: supple; no JVD  Respiratory: CTA B/L; no W/R/R; on RA without respiratory distress  Cardiac: +S1/S2; RRR; no M/R/G  Gastrointestinal: soft, NT/ND; no rebound or guarding; +BSx4  Extremities: WWP, no clubbing or cyanosis; no peripheral edema  Musculoskeletal: mm strength 5/5 BL UE, 5/5 RLE, 3/5 LLE limited by pain (unable to lift off bed); L thigh TTP  Vascular: 2+ radial, DP/PT pulses B/L  Dermatologic: skin warm, dry and intact; no rashes, wounds, or scars  Neurologic: AAOx3; CNII-XII grossly intact; no focal deficits  Psychiatric: affect and characteristics of appearance, verbalizations, behaviors are appropriate    LABS:                          9.6    3.56  )-----------( 135      ( 30 Dec 2022 01:26 )             30.1         Mean Cell Volume : 78.8 fL  Mean Cell Hemoglobin : 25.1 pg  Mean Cell Hemoglobin Concentration : 31.9 gm/dL  Auto Neutrophil # : 2.99 K/uL  Auto Lymphocyte # : 0.38 K/uL  Auto Monocyte # : 0.12 K/uL  Auto Eosinophil # : 0.03 K/uL  Auto Basophil # : 0.00 K/uL  Auto Neutrophil % : 84.1 %  Auto Lymphocyte % : 10.6 %  Auto Monocyte % : 3.5 %  Auto Eosinophil % : 0.9 %  Auto Basophil % : 0.0 %      Serial CBC's  12-30 @ 01:26  Hct-30.1 / Hgb-9.6 / Plat-135 / RBC-3.82 / WBC-3.56  Serial CBC's  12-27 @ 14:39  Hct-28.8 / Hgb-9.5 / Plat-113 / RBC-3.71 / WBC-4.69      12-30    135  |  98  |  8   ----------------------------<  128<H>  3.9   |  26  |  0.47<L>    Ca    9.6      30 Dec 2022 01:26    TPro  7.8  /  Alb  3.9  /  TBili  1.1  /  DBili  x   /  AST  81<H>  /  ALT  53<H>  /  AlkPhos  217<H>  12-30                      RADIOLOGY & ADDITIONAL STUDIES:     Patient is a 57y old  Female who presents with a chief complaint of Difficulty ambulating (30 Dec 2022 08:50)      HPI:  Pt is a 56 yo F with PMH pancreatic  CA (mets to L spine, L femur; Cong, chemo ambraxane/gemzar 12/27,  T2D, GERD, asthma, and HTN p/w LLE pain x 1mo with difficulty ambulating x1wk. Recently admitted to [facility] 10/22-11/1 for abdominal pain, found to have gastritis and colitis likely in the setting of radiation with course c/b E coli UTI and PNA s/p abx. Pt requested daughter Olegario  to provide majority of PMH and serve as . Pt with new LLE pain since Thanksgiving and had MRI early Dec showing metastatic disease to L spine and L femur.     As per patient and dtr have not gotten any radiation to her L thigh. Had been on oxycodone 5mg which was increased to 10mg without relief.  Was then referred to pain management who started methadone 5mg without relief. She is well known to outpatient Palliative Care team Dr Solis Mayes.  Appears that pt may have stopped oxycodone when started on methadone. Also with chronic poor appetite and poor PO intake. Denies any other complaints.     On arrival, T 97.7, HR 83, /60, RR 15 O2sat 100% RA. Labs with WBC 3.56, Hb 9.6, MCV 78, plt 135, , AST 81, ALT 53, RVP neg. XR L knee, L hip, pelvis, and L femur all neg acute fx. Pt given morphine 4mg IV and zofran 4mg IV. Admitted to medicine for further observation and management.  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    Initially was admitted to [facility] on 12/18/18 with dizziness, found to have elevated LFTs. MRCP demonstrated a dilatation of the CBD. ERCP on 12/22/18 performed c/w ampullary mass with obstruction. Pathology c/w adenocarcinoma. Pt was discharged for out pt follow up with surg onc and med onc but was re-admitted on 1/5/19 with n/v, worsening abdominal pain, found to have severe sepsis with E coli bacteremia 2/2 acute cholangitis.    1/5/19: CT A/P Heterogeneity of the right hepatic lobe with questionable 1.3 cm hypoattenuating lesion in segment 7.  1/7/19: ERCP performed. Stent was removed from the biliary tree. One plastic stent placed  1/8/19: MRI Abd: No suspicious liver lesions. No abnormality is identified to correspond with questionable lesion identified on prior CT.  1/11/19: S/p ex-lap, Whipple pancreaticoduodenectomy for duodenal adenocarcinoma. - T3N1b  1/18-1/20/19: Developed hemorrhagic shock, drop in H/H to 6/19 lactate 9. Taken to the OR for evacuation of hematoma, cessation of gastroduodenal artery bleeding with two hemostatic stitches, and placement of abthera VAC. Required pressors in the SICU, then developed fever.  1/21-1/22/19: OR exploratory laparotomy, drainage of abdominal abscess, repair of abdominal wall hernia with Strattice mesh, b/ abdominal wall advancement flaps. Post-op patient remained intubated and off vasopressors. FENA calculated to be pre-renal. 500 CC albumin bolus given, urine output improved. 1/22/19 attempted spontaneous breathing trials but pt hypoxic. Pt lactate uptrend, resuscitated w/IVF, and pt hypotensive requiring pressors. UCx growing candida, Diflucan changed to micafungin to r/o resistant organisms in the setting of worsening sepsis.  1/23/19: patient noted to have blood oozing from midline incision while hypotensive and on pressors. Peak pressures noted to be elevated on vent and abdomen increasingly distended. All staples removed and a large amount of clot evacuated from midline incision. 1U PRBC given. No active bleeding appreciated. Fascia remains closed. Wound packed with wet to dry dressing. Vitamin K given for elevated INR.  1/24-1/29/19: remained in SICU, started on TPN, diuresed, finally extubated.  2/1/19: septic shock again, pressors restarted  2/2-2/11/19: CT scan with peritonitis, new abscesses. Ab and Antifungals started. Wound with increase in drainage. Octreotide started for increased output  2/12/19: drainage of abscesses by IR.  2/18/19: Wound was partially closed by plastics with interrupted sutures and an ostomy appliance was placed, VAC removed.  3/1/19. d/c home  4/30-10/21/19: s/p 8 cycles Gemzar/Xeloda chemotherapy  10/10/19: CT CAP: no evidence of mets  1/20-11/2020: prolonged stay in [facility] due to COVID  11/6/20: CT CAP: 2 mildly enlarged mesenteric LN, attention in f/u  5/19/21: CT CAP revealed stable exam, unchanged cardiophrenic and mesenteric root lymph nodes  12/7/21: CT CAP: stable disease  1/21/22: ventral hernia repair  3/10/22: CT abd/pel: significant interval improvement of previously described rectus/anterior abdominal wall hematoma, previously described focal hematoma at the left anterior abdominal wall is decreased in size  7/1/22: PET/CT: Masslike consolidation in the anterior left upper lung with intense uptake and left paratracheal lymph node, small focus of uptake in the right hepatic lobe near the severino hepatis suspicious for metastatic lesion.  7/19-7/22/22: [facility] admission s/p bronchoscopy 7/20/22; 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)  8/8-8/22/22: [id number] Gemzar/Abraxane  9/6/22: tx held (ANC 0.82)  9/13-9/27/22: [id number] Gemzar/Abraxane (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)  10/12/22: completed 15/15 fx to L Lung  10/22/22-11/1/22: [facility] admission due to sepsis with UTI and post obstructive pna  11/29/22: C1 Gemzar.  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 1000 milliLiter(s) (50 mL/Hr) IV Continuous <Continuous>  dextrose 5%. 1000 milliLiter(s) (100 mL/Hr) IV Continuous <Continuous>  dextrose 50% Injectable 25 Gram(s) IV Push once  dextrose 50% Injectable 12.5 Gram(s) IV Push once  dextrose 50% Injectable 25 Gram(s) IV Push once  enoxaparin Injectable 40 milliGRAM(s) SubCutaneous every 24 hours  gabapentin 300 milliGRAM(s) Oral two times a day  glucagon  Injectable 1 milliGRAM(s) IntraMuscular once  influenza   Vaccine 0.5 milliLiter(s) IntraMuscular once  insulin lispro (ADMELOG) corrective regimen sliding scale   SubCutaneous three times a day before meals  insulin lispro (ADMELOG) corrective regimen sliding scale   SubCutaneous at bedtime  melatonin 3 milliGRAM(s) Oral at bedtime  montelukast 10 milliGRAM(s) Oral at bedtime  multivitamin 1 Tablet(s) Oral daily  pantoprazole    Tablet 40 milliGRAM(s) Oral before breakfast  polyethylene glycol 3350 17 Gram(s) Oral daily    MEDICATIONS  (PRN):  acetaminophen     Tablet .. 650 milliGRAM(s) Oral every 6 hours PRN Temp greater or equal to 38C (100.4F), Mild Pain (1 - 3)  dextrose Oral Gel 15 Gram(s) Oral once PRN Blood Glucose LESS THAN 70 milliGRAM(s)/deciliter  ondansetron Injectable 4 milliGRAM(s) IV Push every 8 hours PRN Nausea and/or Vomiting      Allergies    No Known Allergies    Intolerances        Vital Signs Last 24 Hrs  T(C): 36.6 (30 Dec 2022 10:04), Max: 37.3 (30 Dec 2022 05:50)  T(F): 97.8 (30 Dec 2022 10:04), Max: 99.2 (30 Dec 2022 05:50)  HR: 72 (30 Dec 2022 10:04) (68 - 87)  BP: 99/54 (30 Dec 2022 10:04) (99/54 - 127/60)  BP(mean): --  RR: 17 (30 Dec 2022 10:04) (15 - 17)  SpO2: 100% (30 Dec 2022 10:04) (99% - 100%)    Parameters below as of 30 Dec 2022 10:04  Patient On (Oxygen Delivery Method): room air        PHYSICAL EXAM  PHYSICAL EXAM:  Constitutional: thin and frail F, looks older than stated age, appears fatigued  Head: NC/AT  Eyes: PERRL, EOMI, anicteric sclera  ENT: no nasal discharge; MMM  Neck: supple; no JVD  Respiratory: CTA B/L; no W/R/R; on RA without respiratory distress  Cardiac: +S1/S2; RRR; no M/R/G  Gastrointestinal: soft, NT/ND; no rebound or guarding; +BSx4  Extremities: WWP, no clubbing or cyanosis; no peripheral edema  Musculoskeletal: mm strength 5/5 BL UE, 5/5 RLE, 3/5 LLE limited by pain (unable to lift off bed); L thigh TTP  Vascular: 2+ radial, DP/PT pulses B/L  Dermatologic: skin warm, dry and intact; no rashes, wounds, or scars  Neurologic: AAOx3; CNII-XII grossly intact; no focal deficits  Psychiatric: affect and characteristics of appearance, verbalizations, behaviors are appropriate    LABS:                          9.6    3.56  )-----------( 135      ( 30 Dec 2022 01:26 )             30.1         Mean Cell Volume : 78.8 fL  Mean Cell Hemoglobin : 25.1 pg  Mean Cell Hemoglobin Concentration : 31.9 gm/dL  Auto Neutrophil # : 2.99 K/uL  Auto Lymphocyte # : 0.38 K/uL  Auto Monocyte # : 0.12 K/uL  Auto Eosinophil # : 0.03 K/uL  Auto Basophil # : 0.00 K/uL  Auto Neutrophil % : 84.1 %  Auto Lymphocyte % : 10.6 %  Auto Monocyte % : 3.5 %  Auto Eosinophil % : 0.9 %  Auto Basophil % : 0.0 %      Serial CBC's  12-30 @ 01:26  Hct-30.1 / Hgb-9.6 / Plat-135 / RBC-3.82 / WBC-3.56  Serial CBC's  12-27 @ 14:39  Hct-28.8 / Hgb-9.5 / Plat-113 / RBC-3.71 / WBC-4.69      12-30    135  |  98  |  8   ----------------------------<  128<H>  3.9   |  26  |  0.47<L>    Ca    9.6      30 Dec 2022 01:26    TPro  7.8  /  Alb  3.9  /  TBili  1.1  /  DBili  x   /  AST  81<H>  /  ALT  53<H>  /  AlkPhos  217<H>  12-30                      RADIOLOGY & ADDITIONAL STUDIES:

## 2022-12-30 NOTE — CONSULT NOTE ADULT - ATTENDING COMMENTS
Pt was seen and examined with the resident MD, Dr. Acosta  In brief, Ms. Lagunas is a 56 yo woman with metastatic pancreatic cancer. She initially underwent a Whipple procedure for stage III disease in 2019, followed by adjuvant chemotherapy with Gemzar/Xeloda. In 2022, metastatic disease involving the left upper lobe was found and she underwent radiation therapy completed in Oct. 2022. She developed left leg pain one month ago. She is currently admitted for the pain, which involves the entire left lower extremity, from the thigh and lower buttock to the ankle, lateral/medial/anterior/posterior. A bone scan showed a large focus of disease in the left femoral neck without evidence of cortical destruction. There is a smaller deposit in the L4 left articular process. She is a suitable candidate for palliative radiation therapy to the femoral neck with the goal of pain relief. An MRI pelvis is pending and may provide additional information. Will follow once the information is available then likely set up palliative RT. The patient has a poor functional status as a result and would prefer to receive radiotherapy as an in-pt. The rationale for treatment and the risks/benefits were discussed. Will finalize plan once MRI results are available. Will follow closely with in-pt team. Pt was seen and examined with the resident MD, Dr. Acosta  In brief, Ms. Lagunas is a 56 yo woman with metastatic pancreatic cancer. She initially underwent a Whipple procedure for stage III disease in 2019, followed by adjuvant chemotherapy with Gemzar/Xeloda. In 2022, metastatic disease involving the left upper lobe was found and she underwent radiation therapy completed in Oct. 2022. She developed left leg pain one month ago. She is currently admitted for the pain, which involves the entire left lower extremity, from the thigh and lower buttock to the ankle, lateral/medial/anterior/posterior. A bone scan showed a large focus of disease in the left femoral neck without evidence of cortical destruction. There is a smaller deposit in the L4 left articular process. She is a suitable candidate for palliative radiation therapy to the femoral neck with the goal of pain relief. Final recommendations for orthopedics is pending and will be reviewed. An MRI pelvis is pending and may provide additional information. Will follow once the information is available then likely set up palliative RT. The patient has a poor functional status as a result and would prefer to receive radiotherapy as an in-pt. The rationale for treatment and the risks/benefits were discussed. Will finalize plan once MRI results are available. Will follow closely with in-pt team.

## 2022-12-30 NOTE — H&P ADULT - NSHPLABSRESULTS_GEN_ALL_CORE
LABS:                        9.6    3.56  )-----------( 135      ( 30 Dec 2022 01:26 )             30.1     12-30    135  |  98  |  8   ----------------------------<  128<H>  3.9   |  26  |  0.47<L>    Ca    9.6      30 Dec 2022 01:26    TPro  7.8  /  Alb  3.9  /  TBili  1.1  /  DBili  x   /  AST  81<H>  /  ALT  53<H>  /  AlkPhos  217<H>  12-30        CAPILLARY BLOOD GLUCOSE      POCT Blood Glucose.: 153 mg/dL (29 Dec 2022 22:45)      RADIOLOGY & ADDITIONAL TESTS: Reviewed.

## 2022-12-30 NOTE — CONSULT NOTE ADULT - NS ATTEND AMEND GEN_ALL_CORE FT
58 yo F with PMH metastatic pancreatic cancer presenting with difficulty ambulating and LLE pain. Recent outpatient bone scan with concern for osseous mets and risk of pathologic fracture- recommend eval from rad-onc, ortho-onc and palliative care.

## 2022-12-30 NOTE — ED PROVIDER NOTE - ATTENDING CONTRIBUTION TO CARE
57-year-old female with history of metastatic pancreatic CA currently on chemo (last session 12/27/2022) here with progressively worsening left leg pain.  Patient reports several months of progressive worsening of left hip and thigh pain.  She had been started initially on oxycodone 10 mg every 6 hours by her oncologist, recently started on methadone 5 mg as well.  Patient was seen earlier today at heme-onc clinic for routine evaluation, received 1 mg Dilaudid with improvement per chart note.  She returns as she continues to have pain, unrelieved with p.o. medications at home.  Denies any recent fall or injury.  No redness, swelling, chest pain, shortness of breath, fever.      Chronically ill appearing, appears older than stated age, lying in stretcher, awake and alert, nontoxic.  AF/VSS.  Lungs cta bl.  Cards nl S1/S2, RRR, no MRG.  Abd soft ntnd.  No pedal edema or calf tenderness.  Pelvis is stable, no gross deformity, normal ext/int rotation to hip, no focal tenderness.    Will eval for underlying pathologic fracture.  There are no signs or symptoms concerning for DVT at this time.  Will reassess for pain control.

## 2022-12-30 NOTE — CONSULT NOTE ADULT - PROBLEM SELECTOR RECOMMENDATION 3
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  Osseous lesion in the left femoral neck places the patient at risk for pathologic fracture.  - Oncology recommendations appreciated

## 2022-12-30 NOTE — PHYSICAL THERAPY INITIAL EVALUATION ADULT - LEVEL OF INDEPENDENCE: SUPINE/SIT, REHAB EVAL
To be assessed, pt. deferred at this time. Pt. reported feeling tired and had episode of nausea/vomiting earlier.

## 2022-12-31 LAB
A1C WITH ESTIMATED AVERAGE GLUCOSE RESULT: 5.2 % — SIGNIFICANT CHANGE UP (ref 4–5.6)
ALBUMIN SERPL ELPH-MCNC: 3.5 G/DL — SIGNIFICANT CHANGE UP (ref 3.3–5)
ALP SERPL-CCNC: 207 U/L — HIGH (ref 40–120)
ALT FLD-CCNC: 52 U/L — HIGH (ref 4–33)
ANION GAP SERPL CALC-SCNC: 9 MMOL/L — SIGNIFICANT CHANGE UP (ref 7–14)
ANISOCYTOSIS BLD QL: SLIGHT — SIGNIFICANT CHANGE UP
AST SERPL-CCNC: 58 U/L — HIGH (ref 4–32)
BILIRUB SERPL-MCNC: 0.9 MG/DL — SIGNIFICANT CHANGE UP (ref 0.2–1.2)
BUN SERPL-MCNC: 9 MG/DL — SIGNIFICANT CHANGE UP (ref 7–23)
CALCIUM SERPL-MCNC: 9.6 MG/DL — SIGNIFICANT CHANGE UP (ref 8.4–10.5)
CHLORIDE SERPL-SCNC: 100 MMOL/L — SIGNIFICANT CHANGE UP (ref 98–107)
CO2 SERPL-SCNC: 26 MMOL/L — SIGNIFICANT CHANGE UP (ref 22–31)
CREAT SERPL-MCNC: 0.5 MG/DL — SIGNIFICANT CHANGE UP (ref 0.5–1.3)
EGFR: 110 ML/MIN/1.73M2 — SIGNIFICANT CHANGE UP
ESTIMATED AVERAGE GLUCOSE: 103 — SIGNIFICANT CHANGE UP
GLUCOSE BLDC GLUCOMTR-MCNC: 121 MG/DL — HIGH (ref 70–99)
GLUCOSE BLDC GLUCOMTR-MCNC: 142 MG/DL — HIGH (ref 70–99)
GLUCOSE BLDC GLUCOMTR-MCNC: 203 MG/DL — HIGH (ref 70–99)
GLUCOSE BLDC GLUCOMTR-MCNC: 213 MG/DL — HIGH (ref 70–99)
GLUCOSE SERPL-MCNC: 120 MG/DL — HIGH (ref 70–99)
HCT VFR BLD CALC: 28.6 % — LOW (ref 34.5–45)
HGB BLD-MCNC: 9.1 G/DL — LOW (ref 11.5–15.5)
MAGNESIUM SERPL-MCNC: 1.9 MG/DL — SIGNIFICANT CHANGE UP (ref 1.6–2.6)
MANUAL SMEAR VERIFICATION: SIGNIFICANT CHANGE UP
MCHC RBC-ENTMCNC: 24.7 PG — LOW (ref 27–34)
MCHC RBC-ENTMCNC: 31.8 GM/DL — LOW (ref 32–36)
MCV RBC AUTO: 77.7 FL — LOW (ref 80–100)
MICROCYTES BLD QL: SLIGHT — SIGNIFICANT CHANGE UP
NRBC # BLD: 0 /100 WBCS — SIGNIFICANT CHANGE UP (ref 0–0)
NRBC # FLD: 0 K/UL — SIGNIFICANT CHANGE UP (ref 0–0)
PHOSPHATE SERPL-MCNC: 3.8 MG/DL — SIGNIFICANT CHANGE UP (ref 2.5–4.5)
PLAT MORPH BLD: NORMAL — SIGNIFICANT CHANGE UP
PLATELET # BLD AUTO: 144 K/UL — LOW (ref 150–400)
PLATELET COUNT - ESTIMATE: ABNORMAL
POTASSIUM SERPL-MCNC: 4.2 MMOL/L — SIGNIFICANT CHANGE UP (ref 3.5–5.3)
POTASSIUM SERPL-SCNC: 4.2 MMOL/L — SIGNIFICANT CHANGE UP (ref 3.5–5.3)
PROT SERPL-MCNC: 7.3 G/DL — SIGNIFICANT CHANGE UP (ref 6–8.3)
RBC # BLD: 3.68 M/UL — LOW (ref 3.8–5.2)
RBC # FLD: 15.6 % — HIGH (ref 10.3–14.5)
RBC BLD AUTO: NORMAL — SIGNIFICANT CHANGE UP
SMUDGE CELLS # BLD: PRESENT — SIGNIFICANT CHANGE UP
SODIUM SERPL-SCNC: 135 MMOL/L — SIGNIFICANT CHANGE UP (ref 135–145)
VARIANT LYMPHS # BLD: 3.5 % — SIGNIFICANT CHANGE UP (ref 0–6)
WBC # BLD: 2.76 K/UL — LOW (ref 3.8–10.5)
WBC # FLD AUTO: 2.76 K/UL — LOW (ref 3.8–10.5)

## 2022-12-31 PROCEDURE — 99233 SBSQ HOSP IP/OBS HIGH 50: CPT | Mod: GC

## 2022-12-31 RX ADMIN — Medication 1 TABLET(S): at 17:46

## 2022-12-31 RX ADMIN — Medication 3 MILLIGRAM(S): at 22:15

## 2022-12-31 RX ADMIN — METHADONE HYDROCHLORIDE 2.5 MILLIGRAM(S): 40 TABLET ORAL at 18:38

## 2022-12-31 RX ADMIN — PANTOPRAZOLE SODIUM 40 MILLIGRAM(S): 20 TABLET, DELAYED RELEASE ORAL at 07:28

## 2022-12-31 RX ADMIN — GABAPENTIN 300 MILLIGRAM(S): 400 CAPSULE ORAL at 18:38

## 2022-12-31 RX ADMIN — METHADONE HYDROCHLORIDE 2.5 MILLIGRAM(S): 40 TABLET ORAL at 07:28

## 2022-12-31 RX ADMIN — ENOXAPARIN SODIUM 40 MILLIGRAM(S): 100 INJECTION SUBCUTANEOUS at 18:37

## 2022-12-31 RX ADMIN — Medication 400 UNIT(S): at 18:39

## 2022-12-31 RX ADMIN — Medication 4: at 12:51

## 2022-12-31 RX ADMIN — POLYETHYLENE GLYCOL 3350 17 GRAM(S): 17 POWDER, FOR SOLUTION ORAL at 12:51

## 2022-12-31 RX ADMIN — Medication 5 MILLIGRAM(S): at 22:16

## 2022-12-31 RX ADMIN — Medication 4: at 18:39

## 2022-12-31 RX ADMIN — GABAPENTIN 300 MILLIGRAM(S): 400 CAPSULE ORAL at 07:28

## 2022-12-31 RX ADMIN — MONTELUKAST 10 MILLIGRAM(S): 4 TABLET, CHEWABLE ORAL at 22:15

## 2022-12-31 NOTE — PROGRESS NOTE ADULT - PROBLEM SELECTOR PLAN 1
- pt p/w 1mo progressive LLE pain and difficulty ambulating x1wk; likely in setting of known L femur metastatic disease; previously on 5mg oxycodone and increased to 10mg; f/w pain management and recently started on methadone 5mg in the last week without relief  - XR L knee/hip/pelvis/femur neg acute fx  - PT consulted, f/u recs  - pain control: tylenol (mild), oxy 10mg (moderate), morphine 15mg (severe); morphine 2mg IV for breakthrough  - adjust pain regimen PRN  - bowel regimen while on opioids  - awaiting MRI, will likely benefit from inpt RT.

## 2022-12-31 NOTE — PROGRESS NOTE ADULT - PROBLEM SELECTOR PLAN 2
- pt with hx pancreatic CA f/w Dr. Katty Navarro @ [facility]; known mets to L spine and L femur, on chemo (ambraxane and gemzar) last dose 12/27 and started radiation to L spine early December x1 session   - will consult heme-onc -pt may benefit from additional radiation sessions  - awaiting MRI, plan for inpt RT once MRI resulted

## 2022-12-31 NOTE — PROGRESS NOTE ADULT - ASSESSMENT
Pt is a 58 yo F with PMH pancreat CA (mets to L spine, L femur; Cong, chemo ambraxane/gemzar 12/27, radiation ?12/15), T2D, GERD, asthma, and HTN p/w LLE pain x 1mo with difficulty ambulating x1wk. Likely in the setting of known metastatic disease. Rad onc planning RT after MRI.

## 2022-12-31 NOTE — PROGRESS NOTE ADULT - SUBJECTIVE AND OBJECTIVE BOX
[facility] Division of Hospital Medicine  Kia Hernandez MD  Pager (M-F, 8A-5P): [phone]  Other Times:  [phone]    Patient is a 57y old  Female who presents with a chief complaint of Difficulty ambulating (30 Dec 2022 19:22)      SUBJECTIVE / OVERNIGHT EVENTS:  pt continues to have pain - daughter at bedside providing translation.  Eating small amounts, no nausea or vomiting.      MEDICATIONS  (STANDING):  bisacodyl 5 milliGRAM(s) Oral at bedtime  cholecalciferol 400 Unit(s) Oral daily  dextrose 5%. 1000 milliLiter(s) (50 mL/Hr) IV Continuous <Continuous>  dextrose 5%. 1000 milliLiter(s) (100 mL/Hr) IV Continuous <Continuous>  dextrose 50% Injectable 25 Gram(s) IV Push once  dextrose 50% Injectable 12.5 Gram(s) IV Push once  dextrose 50% Injectable 25 Gram(s) IV Push once  enoxaparin Injectable 40 milliGRAM(s) SubCutaneous every 24 hours  gabapentin 300 milliGRAM(s) Oral two times a day  glucagon  Injectable 1 milliGRAM(s) IntraMuscular once  influenza   Vaccine 0.5 milliLiter(s) IntraMuscular once  insulin lispro (ADMELOG) corrective regimen sliding scale   SubCutaneous three times a day before meals  insulin lispro (ADMELOG) corrective regimen sliding scale   SubCutaneous at bedtime  melatonin 3 milliGRAM(s) Oral at bedtime  methadone    Tablet 2.5 milliGRAM(s) Oral two times a day  montelukast 10 milliGRAM(s) Oral at bedtime  multivitamin 1 Tablet(s) Oral daily  pantoprazole    Tablet 40 milliGRAM(s) Oral before breakfast  polyethylene glycol 3350 17 Gram(s) Oral daily    MEDICATIONS  (PRN):  acetaminophen     Tablet .. 650 milliGRAM(s) Oral every 6 hours PRN Temp greater or equal to 38C (100.4F), Mild Pain (1 - 3)  dextrose Oral Gel 15 Gram(s) Oral once PRN Blood Glucose LESS THAN 70 milliGRAM(s)/deciliter  ondansetron Injectable 4 milliGRAM(s) IV Push every 8 hours PRN Nausea and/or Vomiting  oxyCODONE    IR 10 milliGRAM(s) Oral every 4 hours PRN Moderate Pain (4 - 6)  oxyCODONE    IR 15 milliGRAM(s) Oral every 4 hours PRN Severe Pain (7 - 10)      CAPILLARY BLOOD GLUCOSE      POCT Blood Glucose.: 213 mg/dL (31 Dec 2022 12:13)  POCT Blood Glucose.: 121 mg/dL (31 Dec 2022 09:08)  POCT Blood Glucose.: 150 mg/dL (30 Dec 2022 22:31)  POCT Blood Glucose.: 162 mg/dL (30 Dec 2022 16:45)    I&O's Summary    30 Dec 2022 07:01  -  31 Dec 2022 07:00  --------------------------------------------------------  IN: 50 mL / OUT: 400 mL / NET: -350 mL    31 Dec 2022 07:01  -  31 Dec 2022 15:08  --------------------------------------------------------  IN: 100 mL / OUT: 0 mL / NET: 100 mL        PHYSICAL EXAM:  Vital Signs Last 24 Hrs  T(C): 36.9 (31 Dec 2022 11:08), Max: 36.9 (31 Dec 2022 01:51)  T(F): 98.4 (31 Dec 2022 11:08), Max: 98.5 (31 Dec 2022 07:34)  HR: 65 (31 Dec 2022 11:08) (65 - 77)  BP: 104/49 (31 Dec 2022 11:08) (99/61 - 107/68)  BP(mean): --  RR: 17 (31 Dec 2022 11:08) (16 - 18)  SpO2: 99% (31 Dec 2022 11:08) (96% - 99%)    Parameters below as of 31 Dec 2022 11:08  Patient On (Oxygen Delivery Method): room air        CONSTITUTIONAL: NAD, cachectic   EYES: EOMI; conjunctiva and sclera clear  ENMT: Moist oral mucosa  RESPIRATORY: Normal respiratory effort; lungs are clear to auscultation bilaterally  CARDIOVASCULAR: Regular rate and rhythm, normal S1 and S2, no murmur; No lower extremity edema  ABDOMEN: Nontender to palpation, normoactive bowel sounds, no rebound/guarding  MUSCULOSKELETAL:   no clubbing or cyanosis of digits; no joint swelling or tenderness to palpation  PSYCH: A+O to person, place, and time; affect appropriate  NEUROLOGY: CN 2-12 are intact and symmetric; no gross sensory deficits   SKIN: No rashes; no palpable lesions    LABS:                        9.1    2.76  )-----------( 144      ( 31 Dec 2022 05:28 )             28.6     12-31    135  |  100  |  9   ----------------------------<  120<H>  4.2   |  26  |  0.50    Ca    9.6      31 Dec 2022 05:28  Phos  3.8     12-31  Mg     1.90     12-31    TPro  7.3  /  Alb  3.5  /  TBili  0.9  /  DBili  x   /  AST  58<H>  /  ALT  52<H>  /  AlkPhos  207<H>  12-31              RADIOLOGY & ADDITIONAL TESTS:  Results Reviewed:   Imaging Personally Reviewed:  Electrocardiogram Personally Reviewed:    COORDINATION OF CARE:  Care Discussed with Consultants/Other Providers [Y/N]: Y  Prior or Outpatient Records Reviewed [Y/N]: Y

## 2023-01-01 DIAGNOSIS — C79.51 SECONDARY MALIGNANT NEOPLASM OF BONE: ICD-10-CM

## 2023-01-01 LAB
ALBUMIN SERPL ELPH-MCNC: 3.5 G/DL — SIGNIFICANT CHANGE UP (ref 3.3–5)
ALP SERPL-CCNC: 214 U/L — HIGH (ref 40–120)
ALT FLD-CCNC: 43 U/L — HIGH (ref 4–33)
ANION GAP SERPL CALC-SCNC: 10 MMOL/L — SIGNIFICANT CHANGE UP (ref 7–14)
AST SERPL-CCNC: 50 U/L — HIGH (ref 4–32)
BILIRUB SERPL-MCNC: 0.9 MG/DL — SIGNIFICANT CHANGE UP (ref 0.2–1.2)
BUN SERPL-MCNC: 7 MG/DL — SIGNIFICANT CHANGE UP (ref 7–23)
CA-I BLD-SCNC: 1.16 MMOL/L — SIGNIFICANT CHANGE UP (ref 1.15–1.29)
CALCIUM SERPL-MCNC: 9.3 MG/DL — SIGNIFICANT CHANGE UP (ref 8.4–10.5)
CHLORIDE SERPL-SCNC: 100 MMOL/L — SIGNIFICANT CHANGE UP (ref 98–107)
CO2 SERPL-SCNC: 26 MMOL/L — SIGNIFICANT CHANGE UP (ref 22–31)
CREAT SERPL-MCNC: 0.51 MG/DL — SIGNIFICANT CHANGE UP (ref 0.5–1.3)
CRP SERPL-MCNC: 10.5 MG/L — HIGH
EGFR: 109 ML/MIN/1.73M2 — SIGNIFICANT CHANGE UP
ERYTHROCYTE [SEDIMENTATION RATE] IN BLOOD: 53 MM/HR — HIGH (ref 4–25)
GLUCOSE BLDC GLUCOMTR-MCNC: 151 MG/DL — HIGH (ref 70–99)
GLUCOSE BLDC GLUCOMTR-MCNC: 159 MG/DL — HIGH (ref 70–99)
GLUCOSE BLDC GLUCOMTR-MCNC: 177 MG/DL — HIGH (ref 70–99)
GLUCOSE BLDC GLUCOMTR-MCNC: 227 MG/DL — HIGH (ref 70–99)
GLUCOSE SERPL-MCNC: 120 MG/DL — HIGH (ref 70–99)
HCT VFR BLD CALC: 27.9 % — LOW (ref 34.5–45)
HGB BLD-MCNC: 9 G/DL — LOW (ref 11.5–15.5)
MAGNESIUM SERPL-MCNC: 1.9 MG/DL — SIGNIFICANT CHANGE UP (ref 1.6–2.6)
MCHC RBC-ENTMCNC: 25.4 PG — LOW (ref 27–34)
MCHC RBC-ENTMCNC: 32.3 GM/DL — SIGNIFICANT CHANGE UP (ref 32–36)
MCV RBC AUTO: 78.8 FL — LOW (ref 80–100)
NRBC # BLD: 0 /100 WBCS — SIGNIFICANT CHANGE UP (ref 0–0)
NRBC # FLD: 0 K/UL — SIGNIFICANT CHANGE UP (ref 0–0)
PHOSPHATE SERPL-MCNC: 3.4 MG/DL — SIGNIFICANT CHANGE UP (ref 2.5–4.5)
PLATELET # BLD AUTO: 141 K/UL — LOW (ref 150–400)
POTASSIUM SERPL-MCNC: 4.1 MMOL/L — SIGNIFICANT CHANGE UP (ref 3.5–5.3)
POTASSIUM SERPL-SCNC: 4.1 MMOL/L — SIGNIFICANT CHANGE UP (ref 3.5–5.3)
PROT SERPL-MCNC: 7.3 G/DL — SIGNIFICANT CHANGE UP (ref 6–8.3)
PTH-INTACT FLD-MCNC: 18 PG/ML — SIGNIFICANT CHANGE UP (ref 15–65)
RBC # BLD: 3.54 M/UL — LOW (ref 3.8–5.2)
RBC # FLD: 15.4 % — HIGH (ref 10.3–14.5)
SODIUM SERPL-SCNC: 136 MMOL/L — SIGNIFICANT CHANGE UP (ref 135–145)
T3 SERPL-MCNC: 99 NG/DL — SIGNIFICANT CHANGE UP (ref 80–200)
TSH SERPL-MCNC: 1.47 UIU/ML — SIGNIFICANT CHANGE UP (ref 0.27–4.2)
WBC # BLD: 2.88 K/UL — LOW (ref 3.8–10.5)
WBC # FLD AUTO: 2.88 K/UL — LOW (ref 3.8–10.5)

## 2023-01-01 PROCEDURE — 99233 SBSQ HOSP IP/OBS HIGH 50: CPT | Mod: GC

## 2023-01-01 PROCEDURE — 99232 SBSQ HOSP IP/OBS MODERATE 35: CPT

## 2023-01-01 RX ADMIN — Medication 4: at 10:13

## 2023-01-01 RX ADMIN — GABAPENTIN 300 MILLIGRAM(S): 400 CAPSULE ORAL at 17:14

## 2023-01-01 RX ADMIN — Medication 5 MILLIGRAM(S): at 21:39

## 2023-01-01 RX ADMIN — GABAPENTIN 300 MILLIGRAM(S): 400 CAPSULE ORAL at 05:59

## 2023-01-01 RX ADMIN — Medication 3 MILLIGRAM(S): at 21:43

## 2023-01-01 RX ADMIN — Medication 1 TABLET(S): at 10:12

## 2023-01-01 RX ADMIN — Medication 2: at 12:45

## 2023-01-01 RX ADMIN — Medication 2: at 17:55

## 2023-01-01 RX ADMIN — POLYETHYLENE GLYCOL 3350 17 GRAM(S): 17 POWDER, FOR SOLUTION ORAL at 10:17

## 2023-01-01 RX ADMIN — MONTELUKAST 10 MILLIGRAM(S): 4 TABLET, CHEWABLE ORAL at 21:39

## 2023-01-01 RX ADMIN — PANTOPRAZOLE SODIUM 40 MILLIGRAM(S): 20 TABLET, DELAYED RELEASE ORAL at 10:12

## 2023-01-01 RX ADMIN — ENOXAPARIN SODIUM 40 MILLIGRAM(S): 100 INJECTION SUBCUTANEOUS at 17:15

## 2023-01-01 RX ADMIN — METHADONE HYDROCHLORIDE 2.5 MILLIGRAM(S): 40 TABLET ORAL at 17:14

## 2023-01-01 RX ADMIN — Medication 400 UNIT(S): at 10:12

## 2023-01-01 RX ADMIN — METHADONE HYDROCHLORIDE 2.5 MILLIGRAM(S): 40 TABLET ORAL at 05:59

## 2023-01-01 NOTE — PROGRESS NOTE ADULT - SUBJECTIVE AND OBJECTIVE BOX
[facility] Division of Hospital Medicine  Kia Hernandez MD  Pager (M-F, 8A-5P): [phone]  Other Times:  [phone]    Patient is a 57y old  Female who presents with a chief complaint of Difficulty ambulating (01 Jan 2023 12:50)      SUBJECTIVE / OVERNIGHT EVENTS:  pt comfortable in bed unless she has to move and then she has pain in LLE.  tolerating po.      MEDICATIONS  (STANDING):  bisacodyl 5 milliGRAM(s) Oral at bedtime  cholecalciferol 400 Unit(s) Oral daily  dextrose 5%. 1000 milliLiter(s) (50 mL/Hr) IV Continuous <Continuous>  dextrose 5%. 1000 milliLiter(s) (100 mL/Hr) IV Continuous <Continuous>  dextrose 50% Injectable 25 Gram(s) IV Push once  dextrose 50% Injectable 12.5 Gram(s) IV Push once  dextrose 50% Injectable 25 Gram(s) IV Push once  enoxaparin Injectable 40 milliGRAM(s) SubCutaneous every 24 hours  gabapentin 300 milliGRAM(s) Oral two times a day  glucagon  Injectable 1 milliGRAM(s) IntraMuscular once  influenza   Vaccine 0.5 milliLiter(s) IntraMuscular once  insulin lispro (ADMELOG) corrective regimen sliding scale   SubCutaneous three times a day before meals  insulin lispro (ADMELOG) corrective regimen sliding scale   SubCutaneous at bedtime  melatonin 3 milliGRAM(s) Oral at bedtime  methadone    Tablet 2.5 milliGRAM(s) Oral two times a day  montelukast 10 milliGRAM(s) Oral at bedtime  multivitamin 1 Tablet(s) Oral daily  pantoprazole    Tablet 40 milliGRAM(s) Oral before breakfast  polyethylene glycol 3350 17 Gram(s) Oral daily    MEDICATIONS  (PRN):  acetaminophen     Tablet .. 650 milliGRAM(s) Oral every 6 hours PRN Temp greater or equal to 38C (100.4F), Mild Pain (1 - 3)  dextrose Oral Gel 15 Gram(s) Oral once PRN Blood Glucose LESS THAN 70 milliGRAM(s)/deciliter  ondansetron Injectable 4 milliGRAM(s) IV Push every 8 hours PRN Nausea and/or Vomiting  oxyCODONE    IR 10 milliGRAM(s) Oral every 4 hours PRN Moderate Pain (4 - 6)  oxyCODONE    IR 15 milliGRAM(s) Oral every 4 hours PRN Severe Pain (7 - 10)      CAPILLARY BLOOD GLUCOSE      POCT Blood Glucose.: 159 mg/dL (01 Jan 2023 12:07)  POCT Blood Glucose.: 227 mg/dL (01 Jan 2023 10:10)  POCT Blood Glucose.: 142 mg/dL (31 Dec 2022 22:13)  POCT Blood Glucose.: 203 mg/dL (31 Dec 2022 18:35)    I&O's Summary    31 Dec 2022 07:01  -  01 Jan 2023 07:00  --------------------------------------------------------  IN: 660 mL / OUT: 0 mL / NET: 660 mL        PHYSICAL EXAM:  Vital Signs Last 24 Hrs  T(C): 36.6 (01 Jan 2023 10:12), Max: 36.7 (31 Dec 2022 17:45)  T(F): 97.9 (01 Jan 2023 10:12), Max: 98.1 (31 Dec 2022 17:45)  HR: 62 (01 Jan 2023 13:57) (59 - 75)  BP: 98/62 (01 Jan 2023 13:57) (88/54 - 100/56)  BP(mean): --  RR: 17 (01 Jan 2023 13:57) (16 - 18)  SpO2: 98% (01 Jan 2023 13:57) (98% - 100%)    Parameters below as of 01 Jan 2023 13:57  Patient On (Oxygen Delivery Method): room air    CONSTITUTIONAL: NAD, cachectic   EYES: EOMI; conjunctiva and sclera clear  ENMT: Moist oral mucosa  RESPIRATORY: Normal respiratory effort; lungs are clear to auscultation bilaterally  CARDIOVASCULAR: Regular rate and rhythm, normal S1 and S2, no murmur; No lower extremity edema  ABDOMEN: Nontender to palpation, normoactive bowel sounds, no rebound/guarding  MUSCULOSKELETAL:   no clubbing or cyanosis of digits; no joint swelling or tenderness to palpation  PSYCH: A+O to person, place, and time; affect appropriate  NEUROLOGY: CN 2-12 are intact and symmetric; no gross sensory deficits   SKIN: No rashes; no palpable lesions    LABS:                        9.0    2.88  )-----------( 141      ( 01 Jan 2023 06:00 )             27.9     01-01    136  |  100  |  7   ----------------------------<  120<H>  4.1   |  26  |  0.51    Ca    9.3      01 Jan 2023 06:00  Phos  3.4     01-01  Mg     1.90     01-01    TPro  7.3  /  Alb  3.5  /  TBili  0.9  /  DBili  x   /  AST  50<H>  /  ALT  43<H>  /  AlkPhos  214<H>  01-01              RADIOLOGY & ADDITIONAL TESTS:  Results Reviewed:   Imaging Personally Reviewed:  Electrocardiogram Personally Reviewed:    COORDINATION OF CARE:  Care Discussed with Consultants/Other Providers [Y/N]: Y  Prior or Outpatient Records Reviewed [Y/N]: LEONA

## 2023-01-01 NOTE — CONSULT NOTE ADULT - SUBJECTIVE AND OBJECTIVE BOX
56 yo F with PMH pancreat CA - diagnosed 2019 (with known mets to L spine, L femur and lung , h/o XRT to lung in october 2022, currently on chemotherapy- ambraxane/gemzar 12/27, ), T2D, GERD, asthma, and HTN .  She p/w LBP and left leg, radiating to left groin x 1mo with difficulty ambulating x1wk. Patient able to ambulate with assistance with rolling walker, short distances per daughter.  Recently admitted to [facility] 10/22-11/1 for abdominal pain, found to have gastritis and colitis likely in the setting of radiation with course c/b E coli UTI and PNA s/p abx. Pt requested daughter to provide majority of PMH and serve as . She had an MRI of T/L spine on 12/13/22  showing metastatic disease to L spine and L femur. . Had been on oxycodone 5mg which was increased to 10mg without relief. Was then referred to pain management who started methadone 5mg without relief. Appears that pt may have stopped oxycodone when started on methadone. Also with chronic poor appetite and poor PO intake. Denies any other complaints.   No bowel or baldder incontinence.    She had a pet scan on 12/23 which showed an Abnormal foci in the left femoral neck and lumbar spine highly suspicious for osseous metastases.  Osseous lesion in the left femoral neck places the patient at risk for pathologic fracture.    Radiation Oncology consulted and per daughter she is to start XRT to her left hip and spine on Tuesday.     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 1000 milliLiter(s) (50 mL/Hr) IV Continuous <Continuous>  dextrose 5%. 1000 milliLiter(s) (100 mL/Hr) IV Continuous <Continuous>  dextrose 50% Injectable 25 Gram(s) IV Push once  dextrose 50% Injectable 12.5 Gram(s) IV Push once  dextrose 50% Injectable 25 Gram(s) IV Push once  enoxaparin Injectable 40 milliGRAM(s) SubCutaneous every 24 hours  gabapentin 300 milliGRAM(s) Oral two times a day  glucagon  Injectable 1 milliGRAM(s) IntraMuscular once  influenza   Vaccine 0.5 milliLiter(s) IntraMuscular once  insulin lispro (ADMELOG) corrective regimen sliding scale   SubCutaneous three times a day before meals  insulin lispro (ADMELOG) corrective regimen sliding scale   SubCutaneous at bedtime  melatonin 3 milliGRAM(s) Oral at bedtime  methadone    Tablet 2.5 milliGRAM(s) Oral two times a day  montelukast 10 milliGRAM(s) Oral at bedtime  multivitamin 1 Tablet(s) Oral daily  pantoprazole    Tablet 40 milliGRAM(s) Oral before breakfast  polyethylene glycol 3350 17 Gram(s) Oral daily    MEDICATIONS  (PRN):  acetaminophen     Tablet .. 650 milliGRAM(s) Oral every 6 hours PRN Temp greater or equal to 38C (100.4F), Mild Pain (1 - 3)  dextrose Oral Gel 15 Gram(s) Oral once PRN Blood Glucose LESS THAN 70 milliGRAM(s)/deciliter  ondansetron Injectable 4 milliGRAM(s) IV Push every 8 hours PRN Nausea and/or Vomiting  oxyCODONE    IR 10 milliGRAM(s) Oral every 4 hours PRN Moderate Pain (4 - 6)  oxyCODONE    IR 15 milliGRAM(s) Oral every 4 hours PRN Severe Pain (7 - 10)    Vital Signs Last 24 Hrs  T(C): 36.6 (01 Jan 2023 10:12), Max: 36.7 (31 Dec 2022 14:00)  T(F): 97.9 (01 Jan 2023 10:12), Max: 98.1 (31 Dec 2022 14:00)  HR: 59 (01 Jan 2023 10:12) (59 - 81)  BP: 88/54 (01 Jan 2023 10:12) (88/54 - 117/58)  BP(mean): --  RR: 16 (01 Jan 2023 10:12) (16 - 18)  SpO2: 100% (01 Jan 2023 10:12) (99% - 100%)    Parameters below as of 01 Jan 2023 10:12  Patient On (Oxygen Delivery Method): room air    PE: Information recieved through daughter who interpreted.  AA&0 x 3, CN 2-12 grossly intact, speech clear, follows commands, PERRL  Motor: BUE/RLE 5/5, LLE_ HF/HA 4/5, KF/KE 5/5, PF/DF 5/5  Sensory: SILT, but reports slight decrease in sensation in left groin area    LABS:                        9.0    2.88  )-----------( 141      ( 01 Jan 2023 06:00 )             27.9     01-01    136  |  100  |  7   ----------------------------<  120<H>  4.1   |  26  |  0.51    Ca    9.3      01 Jan 2023 06:00  Phos  3.4     01-01  Mg     1.90     01-01    TPro  7.3  /  Alb  3.5  /  TBili  0.9  /  DBili  x   /  AST  50<H>  /  ALT  43<H>  /  AlkPhos  214<H>  01-01 12-31-22 @ 07:01  -  01-01-23 @ 07:00  --------------------------------------------------------  IN: 660 mL / OUT: 0 mL / NET: 660 mL

## 2023-01-01 NOTE — PROGRESS NOTE ADULT - PROBLEM SELECTOR PLAN 2
- pt with hx pancreatic CA f/w Dr. Katty Navarro @ [facility]; known mets to L spine and L femur, on chemo (ambraxane and gemzar) last dose 12/27 and started radiation to L spine early December x1 session   - appreciate rad on, nsx and med onc recs   - awaiting MRI, plan for inpt RT once MRI resulted

## 2023-01-01 NOTE — PROGRESS NOTE ADULT - PROBLEM SELECTOR PLAN 3
- at baseline per chart review  - likely in setting of known malignancy (chronic disease) and chemotherapy as above  - continue to trend

## 2023-01-01 NOTE — PROGRESS NOTE ADULT - ATTENDING COMMENTS
54y Female PMHx HTN, T2DM, asthma, depression with recent diagnosis of invasive ampullary adenocarcinoma of the CBD admitted for cholangitis on 1/5, s/p ERCP 1/8, whipple 1/11, rapid response for GIB on floor 1/18 s/p MTP, RTOR s/p celiotomy, evacuation of hematoma, GDA bleed stopped and sutured, Abthera vac. Closure of abdomen with strattice mesh on 1/21, with inna drains and left lower JOURDAN drain in subcutaneous. Incisional bleed 1/23, bedside skin staples removed, JOURDAN drain removed, hemorrhage controlled, respiratory failure s/p extubation 1/29, underwent CT A/P 2/2 which showed large LUQ collection and multiple smaller intraabdominal collections s/p IR drainage 2/4 and 2/12 with drain placement.  ?  Neurology:  -Tylenol & Oxy and Dilaudid PRN for pain    Resp:  -supplemental O2 prn to maintain sat > 94%  -OOB, pulmonary toilet   ?  CV:  -goal MAP > 65  -continue close hemodynamic monitoring   ?  GI:  -Tigertube on TFs (Glucerna), clears around tiger tube  -Bowel regimen   c/w octreotide increased bilious output by drain   -protonix (home med)   -LFTs stable     Renal:  -monitor lytes, replete as needed  -IVF on hold, will resuscitate as needed  -hyponatremia, worsening Na 126 today consider salt tabs  ?  Heme:  -monitor H/H, transfuse if <7   -lovenox for vte ppx  ?  ID:  -Febrile, uptrending leukocytosis on vanco (2/3), Diflucan (2/3) --> micafungin (2/5), and meropenem (2/2)  -procalcitonin 0.47   -U/A neg, blood cx neg 2/9 neg to date  -c-diff negative  -continue to trend WBC+    Endo  -ISS A1C 8.0   -FS q6   ?  Dispo: SICU    Critical Care Diagnosis: Septic shock, respiratory abnormality, malnutrition, peritonitis, abdominal abscess  The patient is not a critical care patient with no life threatening hemodynamic and metabolic instability in SICU.  I have personally interviewed when possible and examined the patient, reviewed data and laboratory tests/x-rays and all pertinent electronic images.  I was physically present for the key portions of the evaluation and management (E/M) service provided.   The SICU team has a constant risk benefit analyzes discussion with the primary team, all consultants, House Staff and PA's on all decisions.  These diagnoses are unrelated to the surgical procedure noted above.  I meet with family if needed to get further history, discuss the case and make care decisions for this patient who might not be able to participate.  Time involved in performance of separately billable procedures was not counted toward my critical care time. There is no overlap.  I spent 55-75 minutes of critical care time for the diagnoses, assessment, plan and interventions. 6.748 15.484

## 2023-01-01 NOTE — PROGRESS NOTE ADULT - ASSESSMENT
Pt is a 56 yo F with PMH pancreat CA (mets to L spine, L femur; Cong, chemo ambraxane/gemzar 12/27, radiation ?12/15), T2D, GERD, asthma, and HTN p/w LLE pain x 1mo with difficulty ambulating x1wk. Likely in the setting of known metastatic disease. Rad onc planning RT after MRI.

## 2023-01-01 NOTE — CONSULT NOTE ADULT - ASSESSMENT
58 y/o F, with pancreatic cancer w/ known metastasis to lung, lumbar spine - posterior elements of L1-5, and femur, p/w LBP radiating to LLE.for approximatel one month,   no new imaging to review since admission

## 2023-01-01 NOTE — CONSULT NOTE ADULT - PROBLEM SELECTOR RECOMMENDATION 9
1. case to be d/w attending.
- XRAY Hip/ Left Femur- No acute fracture or dislocation.  - Start PO Methadone 2.5mg BID (QTC on 12/30 - 437)  - PO Gabapentin 300mg BID; if tolerates restarting home dose (which she has been off for the past 10 days) and renal function wnl can increase to Gabapentin 300mg TID this weekend   - PO Oxy IR 10mg q4 PRN moderate pain (hold for hypotension, oversedation, respiratory depression)  - Increase to PO Oxy IR 15mg q4PRN severe pain (hold for hypotension, oversedation, respiratory depression)  - Recommend Rad/ Onc consult for RT   - Bowel regimen while on opiates

## 2023-01-02 LAB
ALBUMIN SERPL ELPH-MCNC: 3.5 G/DL — SIGNIFICANT CHANGE UP (ref 3.3–5)
ALP SERPL-CCNC: 214 U/L — HIGH (ref 40–120)
ALT FLD-CCNC: 41 U/L — HIGH (ref 4–33)
ANION GAP SERPL CALC-SCNC: 8 MMOL/L — SIGNIFICANT CHANGE UP (ref 7–14)
AST SERPL-CCNC: 48 U/L — HIGH (ref 4–32)
BILIRUB SERPL-MCNC: 0.7 MG/DL — SIGNIFICANT CHANGE UP (ref 0.2–1.2)
BUN SERPL-MCNC: 6 MG/DL — LOW (ref 7–23)
CALCIUM SERPL-MCNC: 9.4 MG/DL — SIGNIFICANT CHANGE UP (ref 8.4–10.5)
CHLORIDE SERPL-SCNC: 102 MMOL/L — SIGNIFICANT CHANGE UP (ref 98–107)
CO2 SERPL-SCNC: 27 MMOL/L — SIGNIFICANT CHANGE UP (ref 22–31)
CREAT SERPL-MCNC: 0.49 MG/DL — LOW (ref 0.5–1.3)
EGFR: 111 ML/MIN/1.73M2 — SIGNIFICANT CHANGE UP
GLUCOSE BLDC GLUCOMTR-MCNC: 147 MG/DL — HIGH (ref 70–99)
GLUCOSE BLDC GLUCOMTR-MCNC: 160 MG/DL — HIGH (ref 70–99)
GLUCOSE BLDC GLUCOMTR-MCNC: 197 MG/DL — HIGH (ref 70–99)
GLUCOSE BLDC GLUCOMTR-MCNC: 240 MG/DL — HIGH (ref 70–99)
GLUCOSE SERPL-MCNC: 109 MG/DL — HIGH (ref 70–99)
HCT VFR BLD CALC: 26.6 % — LOW (ref 34.5–45)
HGB BLD-MCNC: 8.6 G/DL — LOW (ref 11.5–15.5)
MAGNESIUM SERPL-MCNC: 1.8 MG/DL — SIGNIFICANT CHANGE UP (ref 1.6–2.6)
MCHC RBC-ENTMCNC: 25.1 PG — LOW (ref 27–34)
MCHC RBC-ENTMCNC: 32.3 GM/DL — SIGNIFICANT CHANGE UP (ref 32–36)
MCV RBC AUTO: 77.8 FL — LOW (ref 80–100)
NRBC # BLD: 0 /100 WBCS — SIGNIFICANT CHANGE UP (ref 0–0)
NRBC # FLD: 0 K/UL — SIGNIFICANT CHANGE UP (ref 0–0)
PHOSPHATE SERPL-MCNC: 3.9 MG/DL — SIGNIFICANT CHANGE UP (ref 2.5–4.5)
PLATELET # BLD AUTO: 126 K/UL — LOW (ref 150–400)
POTASSIUM SERPL-MCNC: 3.8 MMOL/L — SIGNIFICANT CHANGE UP (ref 3.5–5.3)
POTASSIUM SERPL-SCNC: 3.8 MMOL/L — SIGNIFICANT CHANGE UP (ref 3.5–5.3)
PROT SERPL-MCNC: 6.9 G/DL — SIGNIFICANT CHANGE UP (ref 6–8.3)
RBC # BLD: 3.42 M/UL — LOW (ref 3.8–5.2)
RBC # FLD: 15.4 % — HIGH (ref 10.3–14.5)
SODIUM SERPL-SCNC: 137 MMOL/L — SIGNIFICANT CHANGE UP (ref 135–145)
WBC # BLD: 2.09 K/UL — LOW (ref 3.8–10.5)
WBC # FLD AUTO: 2.09 K/UL — LOW (ref 3.8–10.5)

## 2023-01-02 PROCEDURE — 99233 SBSQ HOSP IP/OBS HIGH 50: CPT | Mod: GC

## 2023-01-02 PROCEDURE — 73521 X-RAY EXAM HIPS BI 2 VIEWS: CPT | Mod: 26

## 2023-01-02 RX ADMIN — MONTELUKAST 10 MILLIGRAM(S): 4 TABLET, CHEWABLE ORAL at 22:57

## 2023-01-02 RX ADMIN — PANTOPRAZOLE SODIUM 40 MILLIGRAM(S): 20 TABLET, DELAYED RELEASE ORAL at 06:57

## 2023-01-02 RX ADMIN — POLYETHYLENE GLYCOL 3350 17 GRAM(S): 17 POWDER, FOR SOLUTION ORAL at 10:18

## 2023-01-02 RX ADMIN — Medication 4: at 12:15

## 2023-01-02 RX ADMIN — Medication 1 TABLET(S): at 09:10

## 2023-01-02 RX ADMIN — METHADONE HYDROCHLORIDE 2.5 MILLIGRAM(S): 40 TABLET ORAL at 06:57

## 2023-01-02 RX ADMIN — METHADONE HYDROCHLORIDE 2.5 MILLIGRAM(S): 40 TABLET ORAL at 17:15

## 2023-01-02 RX ADMIN — Medication 400 UNIT(S): at 09:09

## 2023-01-02 RX ADMIN — Medication 3 MILLIGRAM(S): at 22:57

## 2023-01-02 RX ADMIN — GABAPENTIN 300 MILLIGRAM(S): 400 CAPSULE ORAL at 06:57

## 2023-01-02 RX ADMIN — Medication 2: at 17:37

## 2023-01-02 RX ADMIN — GABAPENTIN 300 MILLIGRAM(S): 400 CAPSULE ORAL at 17:16

## 2023-01-02 RX ADMIN — ENOXAPARIN SODIUM 40 MILLIGRAM(S): 100 INJECTION SUBCUTANEOUS at 17:16

## 2023-01-02 RX ADMIN — Medication 5 MILLIGRAM(S): at 22:57

## 2023-01-02 NOTE — PROGRESS NOTE ADULT - PROBLEM SELECTOR PLAN 2
- pt with hx pancreatic CA f/w Dr. Katty Navarro @ [facility]; known mets to L spine and L femur, on chemo (ambraxane and gemzar) last dose 12/27 and started radiation to L spine early December x1 session   - appreciate rad on, nsx and med onc recs   - awaiting MRI, plan for inpt RT once MRI resulted  - CT pending

## 2023-01-02 NOTE — CONSULT NOTE ADULT - ATTENDING COMMENTS
Critical Care Dx DOS 1/21/22  Mrs. Lagunas is s.p abdominal component separation now with hypotension and with a concern of abdominal compartment syndrome.      I95.89 Postoperative hypotension   -s/o hernia repair with elevated peak airway pressures noted in OR.  After some more release the peak airway pressures were relived and she was successfully extubated. Her lactate remains elevated however her MAP/BP is stable and she is urinating well. Her bladder pressure was measure and it was very low - given no paralysis and awake this measurement should lessen the concern   -Target MAP 65  -pain control   E87.2 Metabolic acidosis   -Lactic acidosis from transient hypoperfusion in OR likely due to a combination of under-resuscitation and underlying DM  -trend value and await correction, clinically doing well, voiding.   -monitor crit   R73.9 Hyperglycemia   -NIDDM at baseline however she is likely experiencing stress hyperglycemia.  We will cover with short acting insulin and transition to basal/bolus regimen when stable or if needed  -check A1c when stable   E87.5 Hyperkalemia   -Should correct with insulin therapy and resuscitation  -EKG stable       The patient is a critical care patient with life threatening hemodynamic and metabolic instability in SICU.  I have personally interviewed and examined the patient, reviewed data and laboratory tests/x-rays and all pertinent electronic images.  The SICU team has a constant risk benefit analyzes discussion with the primary team, all consultants, House Staff and PA's on all decisions.   Time involved in performance of separately billable procedures was not counted toward my critical care time. There is no overlap.    I have personally provided 60 minutes of critical care time concurrently with the resident/fellow. This time excludes time spent on separate procedures and time spent teaching. I have reviewed the resident's/fellow's documentation and agree with the assessment and plan of care.  I was physically present for the key portions of the evaluation and management (E/M) service provided.      Abdi Carmona MD  Acute and Critical Care Surgery
No

## 2023-01-02 NOTE — PROGRESS NOTE ADULT - SUBJECTIVE AND OBJECTIVE BOX
[facility] Division of Hospital Medicine  Kia Hernandez MD  Pager (M-F, 8A-5P): [phone]  Other Times:  [phone]    Patient is a 57y old  Female who presents with a chief complaint of Difficulty ambulating (01 Jan 2023 14:27)      SUBJECTIVE / OVERNIGHT EVENTS:      MEDICATIONS  (STANDING):  bisacodyl 5 milliGRAM(s) Oral at bedtime  cholecalciferol 400 Unit(s) Oral daily  dextrose 5%. 1000 milliLiter(s) (50 mL/Hr) IV Continuous <Continuous>  dextrose 5%. 1000 milliLiter(s) (100 mL/Hr) IV Continuous <Continuous>  dextrose 50% Injectable 25 Gram(s) IV Push once  dextrose 50% Injectable 12.5 Gram(s) IV Push once  dextrose 50% Injectable 25 Gram(s) IV Push once  enoxaparin Injectable 40 milliGRAM(s) SubCutaneous every 24 hours  gabapentin 300 milliGRAM(s) Oral two times a day  glucagon  Injectable 1 milliGRAM(s) IntraMuscular once  influenza   Vaccine 0.5 milliLiter(s) IntraMuscular once  insulin lispro (ADMELOG) corrective regimen sliding scale   SubCutaneous three times a day before meals  insulin lispro (ADMELOG) corrective regimen sliding scale   SubCutaneous at bedtime  melatonin 3 milliGRAM(s) Oral at bedtime  methadone    Tablet 2.5 milliGRAM(s) Oral two times a day  montelukast 10 milliGRAM(s) Oral at bedtime  multivitamin 1 Tablet(s) Oral daily  pantoprazole    Tablet 40 milliGRAM(s) Oral before breakfast  polyethylene glycol 3350 17 Gram(s) Oral daily    MEDICATIONS  (PRN):  acetaminophen     Tablet .. 650 milliGRAM(s) Oral every 6 hours PRN Temp greater or equal to 38C (100.4F), Mild Pain (1 - 3)  dextrose Oral Gel 15 Gram(s) Oral once PRN Blood Glucose LESS THAN 70 milliGRAM(s)/deciliter  ondansetron Injectable 4 milliGRAM(s) IV Push every 8 hours PRN Nausea and/or Vomiting  oxyCODONE    IR 10 milliGRAM(s) Oral every 4 hours PRN Moderate Pain (4 - 6)  oxyCODONE    IR 15 milliGRAM(s) Oral every 4 hours PRN Severe Pain (7 - 10)      CAPILLARY BLOOD GLUCOSE      POCT Blood Glucose.: 240 mg/dL (02 Jan 2023 12:01)  POCT Blood Glucose.: 147 mg/dL (02 Jan 2023 08:24)  POCT Blood Glucose.: 177 mg/dL (01 Jan 2023 22:18)  POCT Blood Glucose.: 151 mg/dL (01 Jan 2023 17:32)    I&O's Summary    01 Jan 2023 07:01  -  02 Jan 2023 07:00  --------------------------------------------------------  IN: 480 mL / OUT: 600 mL / NET: -120 mL        PHYSICAL EXAM:  Vital Signs Last 24 Hrs  T(C): 36.9 (02 Jan 2023 10:23), Max: 37.2 (02 Jan 2023 01:29)  T(F): 98.5 (02 Jan 2023 10:23), Max: 99 (02 Jan 2023 01:29)  HR: 63 (02 Jan 2023 10:23) (63 - 65)  BP: 99/- (02 Jan 2023 10:23) (99/- - 105/58)  BP(mean): 60 (02 Jan 2023 10:23) (60 - 60)  RR: 18 (02 Jan 2023 10:23) (17 - 18)  SpO2: 97% (02 Jan 2023 10:23) (97% - 99%)    Parameters below as of 02 Jan 2023 10:23  Patient On (Oxygen Delivery Method): room air      CONSTITUTIONAL: NAD, cachectic   EYES: EOMI; conjunctiva and sclera clear  ENMT: Moist oral mucosa  RESPIRATORY: Normal respiratory effort; lungs are clear to auscultation bilaterally  CARDIOVASCULAR: Regular rate and rhythm, normal S1 and S2, no murmur; No lower extremity edema  ABDOMEN: Nontender to palpation, normoactive bowel sounds, no rebound/guarding  MUSCULOSKELETAL:   no clubbing or cyanosis of digits; no joint swelling or tenderness to palpation  PSYCH: A+O to person, place, and time; affect appropriate  NEUROLOGY: CN 2-12 are intact and symmetric; no gross sensory deficits   SKIN: No rashes; no palpable lesions    LABS:                        8.6    2.09  )-----------( 126      ( 02 Jan 2023 06:35 )             26.6     01-02    137  |  102  |  6<L>  ----------------------------<  109<H>  3.8   |  27  |  0.49<L>    Ca    9.4      02 Jan 2023 06:35  Phos  3.9     01-02  Mg     1.80     01-02    TPro  6.9  /  Alb  3.5  /  TBili  0.7  /  DBili  x   /  AST  48<H>  /  ALT  41<H>  /  AlkPhos  214<H>  01-02              RADIOLOGY & ADDITIONAL TESTS:  Results Reviewed:   Imaging Personally Reviewed:  Electrocardiogram Personally Reviewed:    COORDINATION OF CARE:  Care Discussed with Consultants/Other Providers [Y/N]: Y  Prior or Outpatient Records Reviewed [Y/N]: LEONA

## 2023-01-03 DIAGNOSIS — Z71.89 OTHER SPECIFIED COUNSELING: ICD-10-CM

## 2023-01-03 DIAGNOSIS — R53.81 OTHER MALAISE: ICD-10-CM

## 2023-01-03 LAB
ALBUMIN SERPL ELPH-MCNC: 3.7 G/DL — SIGNIFICANT CHANGE UP (ref 3.3–5)
ALP SERPL-CCNC: 271 U/L — HIGH (ref 40–120)
ALT FLD-CCNC: 59 U/L — HIGH (ref 4–33)
ANION GAP SERPL CALC-SCNC: 11 MMOL/L — SIGNIFICANT CHANGE UP (ref 7–14)
AST SERPL-CCNC: 99 U/L — HIGH (ref 4–32)
BASOPHILS # BLD AUTO: 0.01 K/UL — SIGNIFICANT CHANGE UP (ref 0–0.2)
BASOPHILS NFR BLD AUTO: 0.5 % — SIGNIFICANT CHANGE UP (ref 0–2)
BILIRUB SERPL-MCNC: 0.5 MG/DL — SIGNIFICANT CHANGE UP (ref 0.2–1.2)
BUN SERPL-MCNC: 5 MG/DL — LOW (ref 7–23)
CALCIUM SERPL-MCNC: 9.2 MG/DL — SIGNIFICANT CHANGE UP (ref 8.4–10.5)
CHLORIDE SERPL-SCNC: 102 MMOL/L — SIGNIFICANT CHANGE UP (ref 98–107)
CO2 SERPL-SCNC: 24 MMOL/L — SIGNIFICANT CHANGE UP (ref 22–31)
CREAT SERPL-MCNC: 0.45 MG/DL — LOW (ref 0.5–1.3)
EGFR: 113 ML/MIN/1.73M2 — SIGNIFICANT CHANGE UP
EOSINOPHIL # BLD AUTO: 0.05 K/UL — SIGNIFICANT CHANGE UP (ref 0–0.5)
EOSINOPHIL NFR BLD AUTO: 2.5 % — SIGNIFICANT CHANGE UP (ref 0–6)
GLUCOSE BLDC GLUCOMTR-MCNC: 117 MG/DL — HIGH (ref 70–99)
GLUCOSE BLDC GLUCOMTR-MCNC: 138 MG/DL — HIGH (ref 70–99)
GLUCOSE BLDC GLUCOMTR-MCNC: 175 MG/DL — HIGH (ref 70–99)
GLUCOSE BLDC GLUCOMTR-MCNC: 192 MG/DL — HIGH (ref 70–99)
GLUCOSE SERPL-MCNC: 148 MG/DL — HIGH (ref 70–99)
HCT VFR BLD CALC: 27.5 % — LOW (ref 34.5–45)
HGB BLD-MCNC: 8.8 G/DL — LOW (ref 11.5–15.5)
IANC: 0.6 K/UL — LOW (ref 1.8–7.4)
IMM GRANULOCYTES NFR BLD AUTO: 1 % — HIGH (ref 0–0.9)
LYMPHOCYTES # BLD AUTO: 0.93 K/UL — LOW (ref 1–3.3)
LYMPHOCYTES # BLD AUTO: 46.5 % — HIGH (ref 13–44)
MAGNESIUM SERPL-MCNC: 1.7 MG/DL — SIGNIFICANT CHANGE UP (ref 1.6–2.6)
MCHC RBC-ENTMCNC: 25 PG — LOW (ref 27–34)
MCHC RBC-ENTMCNC: 32 GM/DL — SIGNIFICANT CHANGE UP (ref 32–36)
MCV RBC AUTO: 78.1 FL — LOW (ref 80–100)
MONOCYTES # BLD AUTO: 0.39 K/UL — SIGNIFICANT CHANGE UP (ref 0–0.9)
MONOCYTES NFR BLD AUTO: 19.5 % — HIGH (ref 2–14)
NEUTROPHILS # BLD AUTO: 0.6 K/UL — LOW (ref 1.8–7.4)
NEUTROPHILS NFR BLD AUTO: 30 % — LOW (ref 43–77)
NRBC # BLD: 0 /100 WBCS — SIGNIFICANT CHANGE UP (ref 0–0)
NRBC # FLD: 0 K/UL — SIGNIFICANT CHANGE UP (ref 0–0)
PHOSPHATE SERPL-MCNC: 3.9 MG/DL — SIGNIFICANT CHANGE UP (ref 2.5–4.5)
PLATELET # BLD AUTO: 113 K/UL — LOW (ref 150–400)
POTASSIUM SERPL-MCNC: 4.1 MMOL/L — SIGNIFICANT CHANGE UP (ref 3.5–5.3)
POTASSIUM SERPL-SCNC: 4.1 MMOL/L — SIGNIFICANT CHANGE UP (ref 3.5–5.3)
PROT SERPL-MCNC: 7.2 G/DL — SIGNIFICANT CHANGE UP (ref 6–8.3)
RBC # BLD: 3.52 M/UL — LOW (ref 3.8–5.2)
RBC # FLD: 15.5 % — HIGH (ref 10.3–14.5)
SODIUM SERPL-SCNC: 137 MMOL/L — SIGNIFICANT CHANGE UP (ref 135–145)
WBC # BLD: 1.94 K/UL — LOW (ref 3.8–10.5)
WBC # FLD AUTO: 1.94 K/UL — LOW (ref 3.8–10.5)

## 2023-01-03 PROCEDURE — 99233 SBSQ HOSP IP/OBS HIGH 50: CPT

## 2023-01-03 PROCEDURE — 99497 ADVNCD CARE PLAN 30 MIN: CPT | Mod: 25

## 2023-01-03 RX ORDER — ONDANSETRON 8 MG/1
4 TABLET, FILM COATED ORAL EVERY 6 HOURS
Refills: 0 | Status: DISCONTINUED | OUTPATIENT
Start: 2023-01-03 | End: 2023-01-19

## 2023-01-03 RX ORDER — ONDANSETRON 8 MG/1
4 TABLET, FILM COATED ORAL ONCE
Refills: 0 | Status: COMPLETED | OUTPATIENT
Start: 2023-01-03 | End: 2023-01-03

## 2023-01-03 RX ORDER — SENNA PLUS 8.6 MG/1
2 TABLET ORAL AT BEDTIME
Refills: 0 | Status: DISCONTINUED | OUTPATIENT
Start: 2023-01-03 | End: 2023-02-03

## 2023-01-03 RX ORDER — MORPHINE SULFATE 50 MG/1
15 CAPSULE, EXTENDED RELEASE ORAL EVERY 6 HOURS
Refills: 0 | Status: DISCONTINUED | OUTPATIENT
Start: 2023-01-03 | End: 2023-01-03

## 2023-01-03 RX ORDER — MORPHINE SULFATE 50 MG/1
2 CAPSULE, EXTENDED RELEASE ORAL EVERY 4 HOURS
Refills: 0 | Status: DISCONTINUED | OUTPATIENT
Start: 2023-01-03 | End: 2023-01-05

## 2023-01-03 RX ADMIN — Medication 400 UNIT(S): at 11:52

## 2023-01-03 RX ADMIN — OXYCODONE HYDROCHLORIDE 15 MILLIGRAM(S): 5 TABLET ORAL at 12:21

## 2023-01-03 RX ADMIN — ONDANSETRON 4 MILLIGRAM(S): 8 TABLET, FILM COATED ORAL at 23:11

## 2023-01-03 RX ADMIN — GABAPENTIN 300 MILLIGRAM(S): 400 CAPSULE ORAL at 06:34

## 2023-01-03 RX ADMIN — ONDANSETRON 4 MILLIGRAM(S): 8 TABLET, FILM COATED ORAL at 17:21

## 2023-01-03 RX ADMIN — GABAPENTIN 300 MILLIGRAM(S): 400 CAPSULE ORAL at 17:22

## 2023-01-03 RX ADMIN — OXYCODONE HYDROCHLORIDE 15 MILLIGRAM(S): 5 TABLET ORAL at 11:51

## 2023-01-03 RX ADMIN — Medication 3 MILLIGRAM(S): at 22:54

## 2023-01-03 RX ADMIN — Medication 2: at 17:26

## 2023-01-03 RX ADMIN — Medication 5 MILLIGRAM(S): at 22:54

## 2023-01-03 RX ADMIN — METHADONE HYDROCHLORIDE 2.5 MILLIGRAM(S): 40 TABLET ORAL at 06:34

## 2023-01-03 RX ADMIN — POLYETHYLENE GLYCOL 3350 17 GRAM(S): 17 POWDER, FOR SOLUTION ORAL at 11:52

## 2023-01-03 RX ADMIN — SENNA PLUS 2 TABLET(S): 8.6 TABLET ORAL at 22:54

## 2023-01-03 RX ADMIN — Medication 10 MILLIGRAM(S): at 01:30

## 2023-01-03 RX ADMIN — MONTELUKAST 10 MILLIGRAM(S): 4 TABLET, CHEWABLE ORAL at 22:54

## 2023-01-03 RX ADMIN — ONDANSETRON 4 MILLIGRAM(S): 8 TABLET, FILM COATED ORAL at 22:54

## 2023-01-03 RX ADMIN — Medication 1 TABLET(S): at 11:52

## 2023-01-03 RX ADMIN — ENOXAPARIN SODIUM 40 MILLIGRAM(S): 100 INJECTION SUBCUTANEOUS at 17:22

## 2023-01-03 RX ADMIN — PANTOPRAZOLE SODIUM 40 MILLIGRAM(S): 20 TABLET, DELAYED RELEASE ORAL at 06:34

## 2023-01-03 RX ADMIN — METHADONE HYDROCHLORIDE 2.5 MILLIGRAM(S): 40 TABLET ORAL at 17:26

## 2023-01-03 NOTE — PROGRESS NOTE ADULT - PROBLEM SELECTOR PLAN 3
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  Osseous lesion in the left femoral neck places the patient at risk for pathologic fracture.  - Oncology recommendations appreciated.

## 2023-01-03 NOTE — PROGRESS NOTE ADULT - PROBLEM SELECTOR PLAN 1
- pt p/w 1mo progressive LLE pain and difficulty ambulating x1wk; likely in setting of known L femur metastatic disease; previously on 5mg oxycodone and increased to 10mg; f/w pain management and recently started on methadone 5mg in the last week without relief  - XR L knee/hip/pelvis/femur neg acute fx  - PT consulted, f/u recs  - pain control: tylenol (mild), oxy 10mg (moderate), morphine 15mg (severe); morphine 2mg IV for breakthrough--> Palliative care consulted for further pain mgmt rec's. Pt also on 2.5 mg methadone  - adjust pain regimen PRN  - bowel regimen while on opioids  - awaiting MRI, will likely benefit from inpt RT.  - Obtain doppler LLE [ ]

## 2023-01-03 NOTE — PROGRESS NOTE ADULT - SUBJECTIVE AND OBJECTIVE BOX
[facility] Division of Hospital Medicine  Britta Raymond MD  Pager (M-F, 8A-5P): [phone]  Other Times:  [phone]      SUBJECTIVE / OVERNIGHT EVENTS: Pt with severe LLE pain, difficulty ambulating to the bathroom with assistance and reports having to strain when trying defecate which worsens the leg pain. Daughter also reports ongoing nausea and decr oral intake.    MEDICATIONS  (STANDING):  bisacodyl 5 milliGRAM(s) Oral at bedtime  bisacodyl Suppository 10 milliGRAM(s) Rectal once  cholecalciferol 400 Unit(s) Oral daily  dextrose 5%. 1000 milliLiter(s) (50 mL/Hr) IV Continuous <Continuous>  dextrose 5%. 1000 milliLiter(s) (100 mL/Hr) IV Continuous <Continuous>  dextrose 50% Injectable 25 Gram(s) IV Push once  dextrose 50% Injectable 12.5 Gram(s) IV Push once  dextrose 50% Injectable 25 Gram(s) IV Push once  enoxaparin Injectable 40 milliGRAM(s) SubCutaneous every 24 hours  gabapentin 300 milliGRAM(s) Oral two times a day  glucagon  Injectable 1 milliGRAM(s) IntraMuscular once  influenza   Vaccine 0.5 milliLiter(s) IntraMuscular once  insulin lispro (ADMELOG) corrective regimen sliding scale   SubCutaneous three times a day before meals  insulin lispro (ADMELOG) corrective regimen sliding scale   SubCutaneous at bedtime  melatonin 3 milliGRAM(s) Oral at bedtime  methadone    Tablet 2.5 milliGRAM(s) Oral two times a day  montelukast 10 milliGRAM(s) Oral at bedtime  multivitamin 1 Tablet(s) Oral daily  ondansetron   Disintegrating Tablet 4 milliGRAM(s) Oral every 6 hours  pantoprazole    Tablet 40 milliGRAM(s) Oral before breakfast  polyethylene glycol 3350 17 Gram(s) Oral daily  senna 2 Tablet(s) Oral at bedtime    MEDICATIONS  (PRN):  acetaminophen     Tablet .. 650 milliGRAM(s) Oral every 6 hours PRN Temp greater or equal to 38C (100.4F), Mild Pain (1 - 3)  dextrose Oral Gel 15 Gram(s) Oral once PRN Blood Glucose LESS THAN 70 milliGRAM(s)/deciliter  morphine  - Injectable 2 milliGRAM(s) IV Push every 4 hours PRN Severe Pain (7 - 10)  oxyCODONE    IR 10 milliGRAM(s) Oral every 4 hours PRN Moderate Pain (4 - 6)      I&O's Summary    02 Jan 2023 07:01  -  03 Jan 2023 07:00  --------------------------------------------------------  IN: 820 mL / OUT: 850 mL / NET: -30 mL    03 Jan 2023 07:01  -  03 Jan 2023 13:17  --------------------------------------------------------  IN: 240 mL / OUT: 1 mL / NET: 239 mL        PHYSICAL EXAM:  Vital Signs Last 24 Hrs  T(C): 36.6 (03 Jan 2023 06:51), Max: 36.6 (02 Jan 2023 18:04)  T(F): 97.9 (03 Jan 2023 06:51), Max: 97.9 (02 Jan 2023 18:04)  HR: 74 (03 Jan 2023 06:51) (64 - 74)  BP: 91/53 (03 Jan 2023 06:51) (90/53 - 107/66)  BP(mean): 70 (02 Jan 2023 18:04) (70 - 70)  RR: 17 (03 Jan 2023 06:51) (17 - 18)  SpO2: 100% (03 Jan 2023 06:51) (99% - 100%)    Parameters below as of 03 Jan 2023 06:51  Patient On (Oxygen Delivery Method): room air    CONSTITUTIONAL: NAD, cachectic   EYES: EOMI; conjunctiva and sclera clear  ENMT: Moist oral mucosa  RESPIRATORY: Normal respiratory effort; lungs are clear to auscultation bilaterally  CARDIOVASCULAR: Regular rate and rhythm, normal S1 and S2, no murmur; No lower extremity edema  ABDOMEN: Nontender to palpation, normoactive bowel sounds, no rebound/guarding; surgical scar extending from umbilicus to sternum, w/d/i  MUSCULOSKELETAL:   no clubbing or cyanosis of digits; no joint swelling or tenderness to palpation  PSYCH: A+O to person, place, and time; affect appropriate  NEUROLOGY: CN 2-12 are intact and symmetric; no gross sensory deficits   SKIN: No rashes; no palpable lesions    LABS:                        8.8    1.94  )-----------( 113      ( 03 Jan 2023 09:44 )             27.5     01-03    137  |  102  |  5<L>  ----------------------------<  148<H>  4.1   |  24  |  0.45<L>    Ca    9.2      03 Jan 2023 09:44  Phos  3.9     01-03  Mg     1.70     01-03    TPro  7.2  /  Alb  3.7  /  TBili  0.5  /  DBili  x   /  AST  99<H>  /  ALT  59<H>  /  AlkPhos  271<H>  01-03                RADIOLOGY & ADDITIONAL TESTS:  Results Reviewed:   Imaging Personally Reviewed:  Electrocardiogram Personally Reviewed:    COORDINATION OF CARE:  Care Discussed with Consultants/Other Providers [Y/N]: Rad Onc, Ortho, Pall  Prior or Outpatient Records Reviewed [Y/N]:

## 2023-01-03 NOTE — PROGRESS NOTE ADULT - PROBLEM SELECTOR PLAN 5
Thank you for allowing us to participate in your patient's care. Please page [phone] for any questions/concerns.

## 2023-01-03 NOTE — PROGRESS NOTE ADULT - ASSESSMENT
Pt is a 58 yo F with PMH pancreat CA (mets to L spine, L femur; Cong, chemo ambraxane/gemzar 12/27, radiation ?12/15 and s/p Whipple in 2017), T2D, GERD, asthma, and HTN p/w LLE pain x 1mo with difficulty ambulating x1wk. Likely in the setting of known metastatic disease. Rad onc planning RT after MRI.

## 2023-01-03 NOTE — PROGRESS NOTE ADULT - PROBLEM SELECTOR PLAN 1
- XRAY Hip/ Left Femur- Asymmetric vague indistinct lucency in medial left femoral neck with thinned medial femoral neck cortex/calcar corresponds to area of abnormal uptake on the bone scan and compatible with metastatic disease. This area is at increased risk for pathologic fracture. No current fractures dislocations or additional suspected impending pathologic fractures and no additional radiographically appreciated suspicious focal osseous   lesions.  - Ortho and Neurosurgery and Rad/onc recommendations appreciated   - No unexpected adverse effects of opiates noted: no sedation/dizziness/nausea.  - PO Methadone 2.5mg BID (QTC on 12/30 - 437)  - PO Gabapentin 300mg BID  - PO Oxy IR 10mg q4 PRN moderate pain (hold for hypotension, oversedation, respiratory depression)  - PO Oxy IR 15mg q4PRN severe pain (hold for hypotension, oversedation, respiratory depression)  - Bowel regimen while on opiates.

## 2023-01-03 NOTE — PROGRESS NOTE ADULT - ASSESSMENT
56 yo F with PMH pancreat CA (mets to L spine, L femur; Cong, chemo ambraxane/gemzar 12/27, radiation ?12/15), T2D, GERD, asthma, and HTN p/w LLE pain x 1mo with difficulty ambulating x1wk. Likely in the setting of known metastatic disease.  Palliative Care consulted for evaluation and management of pain/ symptoms

## 2023-01-03 NOTE — PROGRESS NOTE ADULT - SUBJECTIVE AND OBJECTIVE BOX
SUBJECTIVE AND OBJECTIVE:  Patient seen and examined at bedside. Patient being followed up for pain.   Patient declined Latvian ; preferred for daughter at bedside to translate.   Patient's pain has been manageable over the weekend if she remains sedentary in bed. Patient notes pain occurs upon movement, especially with attempts of ambulation. Patient with pain during encounter; daughter and patient agreed for prn dose of pain medication to be given. Patient with adequate relief with prn doses when given     INTERVAL HPI/OVERNIGHT EVENTS:  No acute events. Pending MRI     Allergies    No Known Allergies    Intolerances    MEDICATIONS  (STANDING):  bisacodyl 5 milliGRAM(s) Oral at bedtime  bisacodyl Suppository 10 milliGRAM(s) Rectal once  cholecalciferol 400 Unit(s) Oral daily  dextrose 5%. 1000 milliLiter(s) (50 mL/Hr) IV Continuous <Continuous>  dextrose 5%. 1000 milliLiter(s) (100 mL/Hr) IV Continuous <Continuous>  dextrose 50% Injectable 25 Gram(s) IV Push once  dextrose 50% Injectable 12.5 Gram(s) IV Push once  dextrose 50% Injectable 25 Gram(s) IV Push once  enoxaparin Injectable 40 milliGRAM(s) SubCutaneous every 24 hours  gabapentin 300 milliGRAM(s) Oral two times a day  glucagon  Injectable 1 milliGRAM(s) IntraMuscular once  influenza   Vaccine 0.5 milliLiter(s) IntraMuscular once  insulin lispro (ADMELOG) corrective regimen sliding scale   SubCutaneous three times a day before meals  insulin lispro (ADMELOG) corrective regimen sliding scale   SubCutaneous at bedtime  melatonin 3 milliGRAM(s) Oral at bedtime  methadone    Tablet 2.5 milliGRAM(s) Oral two times a day  montelukast 10 milliGRAM(s) Oral at bedtime  multivitamin 1 Tablet(s) Oral daily  ondansetron   Disintegrating Tablet 4 milliGRAM(s) Oral every 6 hours  pantoprazole    Tablet 40 milliGRAM(s) Oral before breakfast  polyethylene glycol 3350 17 Gram(s) Oral daily  senna 2 Tablet(s) Oral at bedtime    MEDICATIONS  (PRN):  acetaminophen     Tablet .. 650 milliGRAM(s) Oral every 6 hours PRN Temp greater or equal to 38C (100.4F), Mild Pain (1 - 3)  dextrose Oral Gel 15 Gram(s) Oral once PRN Blood Glucose LESS THAN 70 milliGRAM(s)/deciliter  morphine  - Injectable 2 milliGRAM(s) IV Push every 4 hours PRN Severe Pain (7 - 10)  morphine  IR 15 milliGRAM(s) Oral every 6 hours PRN Moderate Pain (4 - 6)  oxyCODONE    IR 10 milliGRAM(s) Oral every 4 hours PRN Moderate Pain (4 - 6)  oxyCODONE    IR 15 milliGRAM(s) Oral every 4 hours PRN Severe Pain (7 - 10)      ITEMS UNCHECKED ARE NOT PRESENT    PRESENT SYMPTOMS: [ ]Unable to self-report due to altered mental status- see [ ] CPOT [ ] PAINADS [ ] RDOS  Source if other than patient:  [ ]Family   [ ]Team     Pain: [ x]yes [ ]no  QOL impact - severe  Location - left thigh                     Aggravating factors - movement   Quality - tingling   Radiation - left lower extremity and lower back  Timing- constant with intermittent worsening episodes   Severity (0-10 scale): 10  Minimal acceptable level / Pain Goal (0-10 scale):  7-8    Dyspnea:                           [ ]Mild [ ]Moderate [ ]Severe  Anxiety:                             [ ]Mild [ ]Moderate [ ]Severe  Agitation:                          [ ]Mild [ ]Moderate [ ]Severe  Fatigue:                             [ ]Mild [ ]Moderate [ ]Severe  Nausea:                             [ ]Mild [ ]Moderate [ ]Severe  Loss of appetite:              [ ]Mild [ ]Moderate [ ]Severe  Constipation:                   [ ]Mild [ ]Moderate [ ]Severe  Diarrhea:                          [ ]Mild [ ]Moderate [ ]Severe    CPOT:    https://www.[facility].org/getattachment/[email or web address]-[email or web address]/Critical-Care-Pain-Observation-Tool-(CPOT)    PCSSQ[Palliative Care Spiritual Screening Question]   Severity (0-10):  Score of 4 or > indicate consideration of Chaplaincy referral.  Chaplaincy Referral: [ ] yes [ ] refused [ ] following [x ] deferred    Caregiver Honobia? : [ ] yes [ ] no [ ] Declined [x ] Deferred              Social work referral [ ] Patient & Family Centered Care Referral [ ]     Anticipatory Grief present?:  [ ] yes [ ] no  [ x] Deferred                  Social work referral [ ] Chaplaincy Referral[ ]    Other Symptoms:  [ x]All other review of systems negative     PHYSICAL EXAM:  Vital Signs Last 24 Hrs  T(C): 36.6 (03 Jan 2023 06:51), Max: 36.6 (02 Jan 2023 18:04)  T(F): 97.9 (03 Jan 2023 06:51), Max: 97.9 (02 Jan 2023 18:04)  HR: 74 (03 Jan 2023 06:51) (64 - 74)  BP: 91/53 (03 Jan 2023 06:51) (90/53 - 107/66)  BP(mean): 70 (02 Jan 2023 18:04) (70 - 70)  RR: 17 (03 Jan 2023 06:51) (17 - 18)  SpO2: 100% (03 Jan 2023 06:51) (99% - 100%)    Parameters below as of 03 Jan 2023 06:51  Patient On (Oxygen Delivery Method): room air         I&O's Summary    02 Jan 2023 07:01  -  03 Jan 2023 07:00  --------------------------------------------------------  IN: 820 mL / OUT: 850 mL / NET: -30 mL       GENERAL:  [x ]Alert  [x ]Oriented x 3  [ ]Lethargic  [ ]Cachexia  [ ]Unarousable  [x ]Verbal  [ ]Non-Verbal    Behavioral:   [ ] Anxiety  [ ] Delirium [ ] Agitation [ x] Calm  [ ] Other  HEENT:  [ x]Normal  [ ] Temporal Wasting  [ ]Dry mouth   [ ]ET Tube/Trach  [ ]Oral lesions  [ ] Mucositis  PULMONARY:   [ x]Clear [ ]Tachypnea  [ ]Audible excessive secretions   [ ]Rhonchi        [ ]Right [ ]Left [ ]Bilateral  [ ]Crackles        [ ]Right [ ]Left [ ]Bilateral  [ ]Wheezing     [ ]Right [ ]Left [ ]Bilateral  [ ]Diminished breath sounds [ ]right [ ]left [ ]bilateral  CARDIOVASCULAR:    [ x]Regular [ ]Irregular [ ]Tachy  [ ]Dmitry [ ]Murmur [ ]Other  GASTROINTESTINAL:  [ x]Soft  [ ]Distended   [ ]+BS  [x ]Non tender [ ]Tender  [ ]PEG [ ]OGT/ NGT  Last BM: 1 day ago per patient   GENITOURINARY:  [x ]Normal [ ] Incontinent   [ ]Oliguria/Anuria   [ ]Swanson  MUSCULOSKELETAL:   [x ]Normal , except left lower extremity movement limited due to pain  [ ]Weakness  [ ]Bed/Wheelchair bound [ ]Edema  [  ] amputation  [  ] contraction  NEUROLOGIC:   [ x]No focal deficits  [ ]Cognitive impairment  [ ]Dysphagia [ ]Dysarthria [ ]Paresis [ ]Other   SKIN: See Nursing Skin Assessment for further details  [x ]Normal    [ ]Rash  [ ]Pressure ulcer(s)       Present on admission [ ]y [ ]n   [  ]  Wound    [  ] hyperpigmentation      CRITICAL CARE:  [ ]Shock Present  [ ]Septic [ ]Cardiogenic [ ]Neurologic [ ]Hypovolemic  [ ]Vasopressors [ ]Inotropes  [ ]Respiratory failure present [ ]Mechanical Ventilation [ ]Non-invasive ventilatory support [ ]High-Flow   [ ]Acute  [ ]Chronic [ ]Hypoxic  [ ]Hypercarbic [ ]Other  [ ]Other organ failure     LABS:                        8.8    1.94  )-----------( 113      ( 03 Jan 2023 09:44 )             27.5   01-03    137  |  102  |  5<L>  ----------------------------<  148<H>  4.1   |  24  |  0.45<L>    Ca    9.2      03 Jan 2023 09:44  Phos  3.9     01-03  Mg     1.70     01-03    TPro  7.2  /  Alb  3.7  /  TBili  0.5  /  DBili  x   /  AST  99<H>  /  ALT  59<H>  /  AlkPhos  271<H>  01-03      CAPILLARY BLOOD GLUCOSE      POCT Blood Glucose.: 138 mg/dL (03 Jan 2023 12:29)  POCT Blood Glucose.: 117 mg/dL (03 Jan 2023 08:56)  POCT Blood Glucose.: 160 mg/dL (02 Jan 2023 21:52)  POCT Blood Glucose.: 197 mg/dL (02 Jan 2023 17:23)      RADIOLOGY & ADDITIONAL STUDIES:  XRAY 1/2/2023  Asymmetric vague indistinct lucency in medial left femoral neck with   thinned medial femoral neck cortex/calcar corresponds to area of abnormal   uptake on the bone scan and compatible with metastatic disease. This area   is at increased risk for pathologic fracture. No current fractures   dislocations or additional suspected impending pathologic fractures and   no additional radiographically appreciated suspicious focal osseous   lesions.    Intact pelvic and obturator rings and symmetrically aligned and spaced SI   joints and pubic symphysis.    Preserved bilateral hip joint spaces. No gross radiographic evidence for   AVN.    Protein Calorie Malnutrition Present: [ ]mild [ ]moderate [ ]severe [ ]underweight [ ]morbid obesity  https://www.andeal.org/vault/2440/web/files/ONC/Table_Clinical%20Characteristics%20to%20Document%20Malnutrition-White%20JV%20et%20al%816052.pdf    Height (cm): 149 (12-29-22 @ 22:41), 149 (12-27-22 @ 16:21), 147.3 (10-22-22 @ 08:57)  Weight (kg): 43.5 (12-27-22 @ 16:21), 47 (10-22-22 @ 08:57), 45.9 (07-20-22 @ 11:51)  BMI (kg/m2): 19.6 (12-29-22 @ 22:41), 19.6 (12-27-22 @ 16:21), 21.7 (10-22-22 @ 08:57)    [ ]PPSV2 < or = 30%  [ ]significant weight loss [ ]poor nutritional intake [ ]anasarca   [ ]Artificial Nutrition    REFERRALS:   [ ]Chaplaincy  [ ]Hospice  [ ]Child Life  [ ]Social Work  [ x]Case management [ ]Holistic Therapy

## 2023-01-04 LAB
ALBUMIN SERPL ELPH-MCNC: 3.7 G/DL — SIGNIFICANT CHANGE UP (ref 3.3–5)
ALP SERPL-CCNC: 265 U/L — HIGH (ref 40–120)
ALT FLD-CCNC: 50 U/L — HIGH (ref 4–33)
ANION GAP SERPL CALC-SCNC: 8 MMOL/L — SIGNIFICANT CHANGE UP (ref 7–14)
AST SERPL-CCNC: 58 U/L — HIGH (ref 4–32)
BILIRUB SERPL-MCNC: 0.6 MG/DL — SIGNIFICANT CHANGE UP (ref 0.2–1.2)
BUN SERPL-MCNC: 6 MG/DL — LOW (ref 7–23)
CALCIUM SERPL-MCNC: 9.7 MG/DL — SIGNIFICANT CHANGE UP (ref 8.4–10.5)
CHLORIDE SERPL-SCNC: 103 MMOL/L — SIGNIFICANT CHANGE UP (ref 98–107)
CO2 SERPL-SCNC: 27 MMOL/L — SIGNIFICANT CHANGE UP (ref 22–31)
CREAT SERPL-MCNC: 0.54 MG/DL — SIGNIFICANT CHANGE UP (ref 0.5–1.3)
EGFR: 108 ML/MIN/1.73M2 — SIGNIFICANT CHANGE UP
GLUCOSE BLDC GLUCOMTR-MCNC: 117 MG/DL — HIGH (ref 70–99)
GLUCOSE BLDC GLUCOMTR-MCNC: 165 MG/DL — HIGH (ref 70–99)
GLUCOSE BLDC GLUCOMTR-MCNC: 183 MG/DL — HIGH (ref 70–99)
GLUCOSE BLDC GLUCOMTR-MCNC: 211 MG/DL — HIGH (ref 70–99)
GLUCOSE SERPL-MCNC: 133 MG/DL — HIGH (ref 70–99)
HCT VFR BLD CALC: 28.6 % — LOW (ref 34.5–45)
HGB BLD-MCNC: 9.2 G/DL — LOW (ref 11.5–15.5)
MAGNESIUM SERPL-MCNC: 1.8 MG/DL — SIGNIFICANT CHANGE UP (ref 1.6–2.6)
MCHC RBC-ENTMCNC: 24.9 PG — LOW (ref 27–34)
MCHC RBC-ENTMCNC: 32.2 GM/DL — SIGNIFICANT CHANGE UP (ref 32–36)
MCV RBC AUTO: 77.5 FL — LOW (ref 80–100)
NRBC # BLD: 0 /100 WBCS — SIGNIFICANT CHANGE UP (ref 0–0)
NRBC # FLD: 0 K/UL — SIGNIFICANT CHANGE UP (ref 0–0)
PHOSPHATE SERPL-MCNC: 4.5 MG/DL — SIGNIFICANT CHANGE UP (ref 2.5–4.5)
PLATELET # BLD AUTO: 98 K/UL — LOW (ref 150–400)
POTASSIUM SERPL-MCNC: 4.2 MMOL/L — SIGNIFICANT CHANGE UP (ref 3.5–5.3)
POTASSIUM SERPL-SCNC: 4.2 MMOL/L — SIGNIFICANT CHANGE UP (ref 3.5–5.3)
PROT SERPL-MCNC: 7.4 G/DL — SIGNIFICANT CHANGE UP (ref 6–8.3)
RBC # BLD: 3.69 M/UL — LOW (ref 3.8–5.2)
RBC # FLD: 15.7 % — HIGH (ref 10.3–14.5)
SODIUM SERPL-SCNC: 138 MMOL/L — SIGNIFICANT CHANGE UP (ref 135–145)
WBC # BLD: 3.05 K/UL — LOW (ref 3.8–10.5)
WBC # FLD AUTO: 3.05 K/UL — LOW (ref 3.8–10.5)

## 2023-01-04 PROCEDURE — 99233 SBSQ HOSP IP/OBS HIGH 50: CPT

## 2023-01-04 RX ORDER — PROCHLORPERAZINE MALEATE 5 MG
5 TABLET ORAL EVERY 8 HOURS
Refills: 0 | Status: DISCONTINUED | OUTPATIENT
Start: 2023-01-04 | End: 2023-02-03

## 2023-01-04 RX ADMIN — MONTELUKAST 10 MILLIGRAM(S): 4 TABLET, CHEWABLE ORAL at 22:01

## 2023-01-04 RX ADMIN — Medication 10 MILLIGRAM(S): at 11:44

## 2023-01-04 RX ADMIN — Medication 4: at 08:49

## 2023-01-04 RX ADMIN — POLYETHYLENE GLYCOL 3350 17 GRAM(S): 17 POWDER, FOR SOLUTION ORAL at 11:37

## 2023-01-04 RX ADMIN — ONDANSETRON 4 MILLIGRAM(S): 8 TABLET, FILM COATED ORAL at 05:57

## 2023-01-04 RX ADMIN — ONDANSETRON 4 MILLIGRAM(S): 8 TABLET, FILM COATED ORAL at 17:31

## 2023-01-04 RX ADMIN — ONDANSETRON 4 MILLIGRAM(S): 8 TABLET, FILM COATED ORAL at 22:01

## 2023-01-04 RX ADMIN — GABAPENTIN 300 MILLIGRAM(S): 400 CAPSULE ORAL at 17:30

## 2023-01-04 RX ADMIN — GABAPENTIN 300 MILLIGRAM(S): 400 CAPSULE ORAL at 05:57

## 2023-01-04 RX ADMIN — Medication 3 MILLIGRAM(S): at 22:02

## 2023-01-04 RX ADMIN — METHADONE HYDROCHLORIDE 2.5 MILLIGRAM(S): 40 TABLET ORAL at 05:57

## 2023-01-04 RX ADMIN — ENOXAPARIN SODIUM 40 MILLIGRAM(S): 100 INJECTION SUBCUTANEOUS at 17:31

## 2023-01-04 RX ADMIN — Medication 2: at 18:02

## 2023-01-04 RX ADMIN — ONDANSETRON 4 MILLIGRAM(S): 8 TABLET, FILM COATED ORAL at 11:35

## 2023-01-04 RX ADMIN — METHADONE HYDROCHLORIDE 2.5 MILLIGRAM(S): 40 TABLET ORAL at 17:29

## 2023-01-04 RX ADMIN — Medication 1 TABLET(S): at 11:37

## 2023-01-04 RX ADMIN — Medication 400 UNIT(S): at 11:37

## 2023-01-04 RX ADMIN — PANTOPRAZOLE SODIUM 40 MILLIGRAM(S): 20 TABLET, DELAYED RELEASE ORAL at 07:40

## 2023-01-04 NOTE — PROGRESS NOTE ADULT - PROBLEM SELECTOR PLAN 2
- pt with hx pancreatic CA f/w Dr. Katty Navarro @ [facility]; known mets to L spine and L femur, on chemo (ambraxane and gemzar) last dose 12/27 and started radiation to L spine early December x1 session   - appreciate rad on, nsx and med onc recs   - awaiting MRI, plan for inpt RT once MRI resulted-- called to expedite  - CT pending

## 2023-01-04 NOTE — PROGRESS NOTE ADULT - SUBJECTIVE AND OBJECTIVE BOX
[facility] Division of Hospital Medicine  Britta Raymond MD  Pager (M-F, 8A-5P): [phone]  Other Times:  [phone]      SUBJECTIVE / OVERNIGHT EVENTS: Ongoing nausea after pain medication dosed. Still in pain in the LLE. Had a BM yesterday after suppository.    MEDICATIONS  (STANDING):  bisacodyl 5 milliGRAM(s) Oral at bedtime  cholecalciferol 400 Unit(s) Oral daily  dextrose 5%. 1000 milliLiter(s) (50 mL/Hr) IV Continuous <Continuous>  dextrose 5%. 1000 milliLiter(s) (100 mL/Hr) IV Continuous <Continuous>  dextrose 50% Injectable 25 Gram(s) IV Push once  dextrose 50% Injectable 12.5 Gram(s) IV Push once  dextrose 50% Injectable 25 Gram(s) IV Push once  enoxaparin Injectable 40 milliGRAM(s) SubCutaneous every 24 hours  gabapentin 300 milliGRAM(s) Oral two times a day  glucagon  Injectable 1 milliGRAM(s) IntraMuscular once  influenza   Vaccine 0.5 milliLiter(s) IntraMuscular once  insulin lispro (ADMELOG) corrective regimen sliding scale   SubCutaneous three times a day before meals  insulin lispro (ADMELOG) corrective regimen sliding scale   SubCutaneous at bedtime  LORazepam     Tablet 0.5 milliGRAM(s) Oral once  melatonin 3 milliGRAM(s) Oral at bedtime  methadone    Tablet 2.5 milliGRAM(s) Oral two times a day  montelukast 10 milliGRAM(s) Oral at bedtime  multivitamin 1 Tablet(s) Oral daily  ondansetron   Disintegrating Tablet 4 milliGRAM(s) Oral every 6 hours  pantoprazole    Tablet 40 milliGRAM(s) Oral before breakfast  polyethylene glycol 3350 17 Gram(s) Oral daily  senna 2 Tablet(s) Oral at bedtime    MEDICATIONS  (PRN):  acetaminophen     Tablet .. 650 milliGRAM(s) Oral every 6 hours PRN Temp greater or equal to 38C (100.4F), Mild Pain (1 - 3)  dextrose Oral Gel 15 Gram(s) Oral once PRN Blood Glucose LESS THAN 70 milliGRAM(s)/deciliter  morphine  - Injectable 2 milliGRAM(s) IV Push every 4 hours PRN Severe Pain (7 - 10)  oxyCODONE    IR 10 milliGRAM(s) Oral every 4 hours PRN Moderate Pain (4 - 6)  prochlorperazine   Injectable 5 milliGRAM(s) IV Push every 8 hours PRN refractory nausea      I&O's Summary    03 Jan 2023 07:01  -  04 Jan 2023 07:00  --------------------------------------------------------  IN: 820 mL / OUT: 950 mL / NET: -130 mL    04 Jan 2023 07:01  -  04 Jan 2023 14:27  --------------------------------------------------------  IN: 480 mL / OUT: 1000 mL / NET: -520 mL        PHYSICAL EXAM:  Vital Signs Last 24 Hrs  T(C): 36.8 (04 Jan 2023 14:00), Max: 36.8 (04 Jan 2023 01:58)  T(F): 98.2 (04 Jan 2023 14:00), Max: 98.3 (04 Jan 2023 01:58)  HR: 67 (04 Jan 2023 14:00) (65 - 72)  BP: 115/72 (04 Jan 2023 14:00) (96/50 - 115/72)  BP(mean): --  RR: 18 (04 Jan 2023 14:00) (17 - 18)  SpO2: 99% (04 Jan 2023 14:00) (97% - 100%)    Parameters below as of 04 Jan 2023 14:00  Patient On (Oxygen Delivery Method): room air        CONSTITUTIONAL: NAD, cachectic   EYES: EOMI; conjunctiva and sclera clear  ENMT: Moist oral mucosa  RESPIRATORY: Normal respiratory effort; lungs are clear to auscultation bilaterally  CARDIOVASCULAR: Regular rate and rhythm, normal S1 and S2, no murmur; No lower extremity edema  ABDOMEN: Nontender to palpation, normoactive bowel sounds, no rebound/guarding; surgical scar extending from umbilicus to sternum, w/d/i  MUSCULOSKELETAL:   no clubbing or cyanosis of digits; no joint swelling or tenderness to palpation; significant decreased ROM of the LLE (cannot lift off of the bed).  PSYCH: A+O to person, place, and time; affect appropriate  NEUROLOGY: CN 2-12 are intact and symmetric; no gross sensory deficits   SKIN: No rashes; no palpable lesions    LABS:                        9.2    3.05  )-----------( 98       ( 04 Jan 2023 06:02 )             28.6     01-04    138  |  103  |  6<L>  ----------------------------<  133<H>  4.2   |  27  |  0.54    Ca    9.7      04 Jan 2023 06:02  Phos  4.5     01-04  Mg     1.80     01-04    TPro  7.4  /  Alb  3.7  /  TBili  0.6  /  DBili  x   /  AST  58<H>  /  ALT  50<H>  /  AlkPhos  265<H>  01-04                RADIOLOGY & ADDITIONAL TESTS:  Results Reviewed:   Imaging Personally Reviewed:  Electrocardiogram Personally Reviewed:    COORDINATION OF CARE:  Care Discussed with Consultants/Other Providers [Y/N]: ORtho, Pall  Prior or Outpatient Records Reviewed [Y/N]:

## 2023-01-04 NOTE — PROGRESS NOTE ADULT - ASSESSMENT
Pt is a 58 yo F with PMH pancreat CA (mets to L spine, L femur; Ocng, chemo ambraxane/gemzar 12/27, radiation ?12/15 and s/p Whipple in 2017), T2D, GERD, asthma, and HTN p/w LLE pain x 1mo with difficulty ambulating x1wk. Likely in the setting of known metastatic disease, XR showed lucency on the L femoral neck this admission. Rad onc planning RT after MRI, [ ].  F/u doppler LLE [ ].    Course complicated by ongoing severe pain, nausea, vomiting and constipation. Currently on narcotic regimen as below, Palliative care consulted. For nausea, escalated to scheduled zofran q 6 hrs and IV compazine for refractory nausea PRN. Continue aggressive bowel regimen, requiring daily suppositories. Pt is a 58 yo F with PMH pancreat CA (mets to L spine, L femur; Cong, chemo ambraxane/gemzar 12/27, radiation ?12/15 and s/p Whipple in 2017), T2D, GERD, asthma, and HTN p/w LLE pain x 1mo with difficulty ambulating x1wk. Likely in the setting of known metastatic disease, XR showed lucency on the L femoral neck this admission. Rad onc planning RT after MRI, [ ].  F/u doppler LLE [ ].    Course complicated by ongoing severe pain, nausea, vomiting and constipation. Currently on narcotic regimen as below, Palliative care consulted. For nausea, escalated to scheduled zofran q 6 hrs and IV compazine for refractory nausea PRN. Continue aggressive bowel regimen, requiring daily suppositories.    Neutropenia, (iANC 0.6), continue daily CBC w diff. May need antimicrobial prophylaxis in the coming days.

## 2023-01-05 LAB
ANION GAP SERPL CALC-SCNC: 8 MMOL/L — SIGNIFICANT CHANGE UP (ref 7–14)
BUN SERPL-MCNC: 6 MG/DL — LOW (ref 7–23)
CALCIUM SERPL-MCNC: 9.4 MG/DL — SIGNIFICANT CHANGE UP (ref 8.4–10.5)
CHLORIDE SERPL-SCNC: 103 MMOL/L — SIGNIFICANT CHANGE UP (ref 98–107)
CO2 SERPL-SCNC: 26 MMOL/L — SIGNIFICANT CHANGE UP (ref 22–31)
CREAT SERPL-MCNC: 0.56 MG/DL — SIGNIFICANT CHANGE UP (ref 0.5–1.3)
EGFR: 107 ML/MIN/1.73M2 — SIGNIFICANT CHANGE UP
GLUCOSE BLDC GLUCOMTR-MCNC: 110 MG/DL — HIGH (ref 70–99)
GLUCOSE BLDC GLUCOMTR-MCNC: 129 MG/DL — HIGH (ref 70–99)
GLUCOSE BLDC GLUCOMTR-MCNC: 161 MG/DL — HIGH (ref 70–99)
GLUCOSE BLDC GLUCOMTR-MCNC: 164 MG/DL — HIGH (ref 70–99)
GLUCOSE BLDC GLUCOMTR-MCNC: 192 MG/DL — HIGH (ref 70–99)
GLUCOSE BLDC GLUCOMTR-MCNC: 231 MG/DL — HIGH (ref 70–99)
GLUCOSE SERPL-MCNC: 128 MG/DL — HIGH (ref 70–99)
HCT VFR BLD CALC: 29.3 % — LOW (ref 34.5–45)
HGB BLD-MCNC: 9.5 G/DL — LOW (ref 11.5–15.5)
MAGNESIUM SERPL-MCNC: 1.9 MG/DL — SIGNIFICANT CHANGE UP (ref 1.6–2.6)
MCHC RBC-ENTMCNC: 25.6 PG — LOW (ref 27–34)
MCHC RBC-ENTMCNC: 32.4 GM/DL — SIGNIFICANT CHANGE UP (ref 32–36)
MCV RBC AUTO: 79 FL — LOW (ref 80–100)
NRBC # BLD: 0 /100 WBCS — SIGNIFICANT CHANGE UP (ref 0–0)
NRBC # FLD: 0 K/UL — SIGNIFICANT CHANGE UP (ref 0–0)
PHOSPHATE SERPL-MCNC: 4.2 MG/DL — SIGNIFICANT CHANGE UP (ref 2.5–4.5)
PLATELET # BLD AUTO: 92 K/UL — LOW (ref 150–400)
POTASSIUM SERPL-MCNC: 4.1 MMOL/L — SIGNIFICANT CHANGE UP (ref 3.5–5.3)
POTASSIUM SERPL-SCNC: 4.1 MMOL/L — SIGNIFICANT CHANGE UP (ref 3.5–5.3)
RBC # BLD: 3.71 M/UL — LOW (ref 3.8–5.2)
RBC # FLD: 15.8 % — HIGH (ref 10.3–14.5)
SODIUM SERPL-SCNC: 137 MMOL/L — SIGNIFICANT CHANGE UP (ref 135–145)
WBC # BLD: 3.22 K/UL — LOW (ref 3.8–10.5)
WBC # FLD AUTO: 3.22 K/UL — LOW (ref 3.8–10.5)

## 2023-01-05 PROCEDURE — 99233 SBSQ HOSP IP/OBS HIGH 50: CPT

## 2023-01-05 PROCEDURE — 72197 MRI PELVIS W/O & W/DYE: CPT | Mod: 26

## 2023-01-05 PROCEDURE — 74018 RADEX ABDOMEN 1 VIEW: CPT | Mod: 26

## 2023-01-05 RX ORDER — SODIUM CHLORIDE 9 MG/ML
1000 INJECTION, SOLUTION INTRAVENOUS
Refills: 0 | Status: DISCONTINUED | OUTPATIENT
Start: 2023-01-05 | End: 2023-01-06

## 2023-01-05 RX ORDER — OXYCODONE HYDROCHLORIDE 5 MG/1
15 TABLET ORAL EVERY 4 HOURS
Refills: 0 | Status: DISCONTINUED | OUTPATIENT
Start: 2023-01-05 | End: 2023-01-12

## 2023-01-05 RX ORDER — SODIUM CHLORIDE 9 MG/ML
1000 INJECTION, SOLUTION INTRAVENOUS
Refills: 0 | Status: DISCONTINUED | OUTPATIENT
Start: 2023-01-05 | End: 2023-01-05

## 2023-01-05 RX ORDER — OXYCODONE HYDROCHLORIDE 5 MG/1
10 TABLET ORAL EVERY 4 HOURS
Refills: 0 | Status: DISCONTINUED | OUTPATIENT
Start: 2023-01-05 | End: 2023-01-12

## 2023-01-05 RX ORDER — MORPHINE SULFATE 50 MG/1
2 CAPSULE, EXTENDED RELEASE ORAL EVERY 4 HOURS
Refills: 0 | Status: DISCONTINUED | OUTPATIENT
Start: 2023-01-05 | End: 2023-01-05

## 2023-01-05 RX ORDER — METHADONE HYDROCHLORIDE 40 MG/1
2.5 TABLET ORAL
Refills: 0 | Status: DISCONTINUED | OUTPATIENT
Start: 2023-01-05 | End: 2023-01-12

## 2023-01-05 RX ADMIN — Medication 400 UNIT(S): at 19:33

## 2023-01-05 RX ADMIN — MONTELUKAST 10 MILLIGRAM(S): 4 TABLET, CHEWABLE ORAL at 22:06

## 2023-01-05 RX ADMIN — ENOXAPARIN SODIUM 40 MILLIGRAM(S): 100 INJECTION SUBCUTANEOUS at 19:32

## 2023-01-05 RX ADMIN — METHADONE HYDROCHLORIDE 2.5 MILLIGRAM(S): 40 TABLET ORAL at 19:32

## 2023-01-05 RX ADMIN — METHADONE HYDROCHLORIDE 2.5 MILLIGRAM(S): 40 TABLET ORAL at 05:56

## 2023-01-05 RX ADMIN — Medication 1 TABLET(S): at 13:02

## 2023-01-05 RX ADMIN — POLYETHYLENE GLYCOL 3350 17 GRAM(S): 17 POWDER, FOR SOLUTION ORAL at 13:01

## 2023-01-05 RX ADMIN — GABAPENTIN 300 MILLIGRAM(S): 400 CAPSULE ORAL at 05:57

## 2023-01-05 RX ADMIN — ONDANSETRON 4 MILLIGRAM(S): 8 TABLET, FILM COATED ORAL at 05:56

## 2023-01-05 RX ADMIN — ONDANSETRON 4 MILLIGRAM(S): 8 TABLET, FILM COATED ORAL at 19:33

## 2023-01-05 RX ADMIN — PANTOPRAZOLE SODIUM 40 MILLIGRAM(S): 20 TABLET, DELAYED RELEASE ORAL at 05:56

## 2023-01-05 RX ADMIN — Medication 5 MILLIGRAM(S): at 22:05

## 2023-01-05 RX ADMIN — Medication 3 MILLIGRAM(S): at 22:05

## 2023-01-05 RX ADMIN — Medication 2: at 12:59

## 2023-01-05 RX ADMIN — GABAPENTIN 300 MILLIGRAM(S): 400 CAPSULE ORAL at 19:32

## 2023-01-05 RX ADMIN — SENNA PLUS 2 TABLET(S): 8.6 TABLET ORAL at 22:05

## 2023-01-05 NOTE — PROGRESS NOTE ADULT - ASSESSMENT
Pt is a 56 yo F with PMH pancreat CA (mets to L spine, L femur; Cong, chemo ambraxane/gemzar 12/27, radiation ?12/15 and s/p Whipple in 2017), T2D, GERD, asthma, and HTN p/w LLE pain x 1mo with difficulty ambulating x1wk. Likely in the setting of known metastatic disease, XR showed lucency on the L femoral neck this admission. Rad onc planning RT after MRI, [ ].  F/u doppler LLE [ ].    Course complicated by ongoing severe pain, nausea, vomiting and constipation. Currently on narcotic regimen as below, Palliative care consulted. For nausea, escalated to scheduled zofran q 6 hrs and IV compazine for refractory nausea PRN. Continue aggressive bowel regimen, requiring daily suppositories, last BM on 1/4. Trial of enema today 1/5 and f/u abd XR [ ]. Vomiting has improved on 1/5.    Neutropenia, (iANC 0.6), continue daily CBC w diff. May need antimicrobial prophylaxis in the coming days.

## 2023-01-05 NOTE — PROGRESS NOTE ADULT - PROBLEM SELECTOR PLAN 1
- pt p/w 1mo progressive LLE pain and difficulty ambulating x1wk; likely in setting of known L femur metastatic disease; previously on 5mg oxycodone and increased to 10mg; f/w pain management and recently started on methadone 5mg in the last week without relief  - XR L knee/hip/pelvis/femur neg acute fx  - pain control: tylenol (mild), oxy 10mg (moderate), morphine 15mg (severe); morphine 2mg IV for breakthrough--> Palliative care consulted for further pain mgmt rec's. Pt also on 2.5 mg methadone. Changed her back to oxycodone 10 mg q 4 for mod pain and 15 mg IR q 4 for severe pain.  - bowel regimen while on opioids  - awaiting MRI, will likely benefit from inpt RT.  - Obtain doppler LLE [ ]

## 2023-01-05 NOTE — PROGRESS NOTE ADULT - PROBLEM SELECTOR PLAN 2
- XRAY Hip/ Left Femur- Asymmetric vague indistinct lucency in medial left femoral neck with thinned medial femoral neck cortex/calcar corresponds to area of abnormal uptake on the bone scan and compatible with metastatic disease. This area is at increased risk for pathologic fracture. No current fractures dislocations or additional suspected impending pathologic fractures and no additional radiographically appreciated suspicious focal osseous   lesions.  - Ortho and Neurosurgery and Rad/onc recommendations appreciated   - pending MRI   - PO Methadone 2.5mg BID (QTC on 12/30 - 437)  - PO Gabapentin 300mg BID  - PO Oxy IR 10mg q4 PRN moderate pain (hold for hypotension, oversedation, respiratory depression)  - PO Oxy IR 15mg q4PRN severe pain (hold for hypotension, oversedation, respiratory depression)  - Bowel regimen while on opiates.

## 2023-01-05 NOTE — PROGRESS NOTE ADULT - SUBJECTIVE AND OBJECTIVE BOX
SUBJECTIVE AND OBJECTIVE:  Patient seen and examined at bedside. Patient being followed up for pain.   Patient declined Portuguese ; preferred for daughter at bedside to translate.   Patient reports having pain with movement, such as transferring to chair and when walking to bathroom.  Also with intermittent nausea with last episode of emesis on Tuesday. Has dizziness on standing and ambulation, with occasional room-spinning sensation; patient endorses possibly having nausea associated with dizzy episodes. Daughter states patient not taking pain medication at times due to fear of having nausea if she takes it.   Daughter states patient without any side effects of nausea/ emesis at home when on oxycodone, which she has been on at home.  Denies constipation; last bowel movement yesterday.     INTERVAL HPI/OVERNIGHT EVENTS:  Received 0 prn doses of oxycodone in past 24 hours   Pending MRI     Allergies    No Known Allergies    Intolerances    MEDICATIONS  (STANDING):  bisacodyl 5 milliGRAM(s) Oral at bedtime  cholecalciferol 400 Unit(s) Oral daily  dextrose 5%. 1000 milliLiter(s) (50 mL/Hr) IV Continuous <Continuous>  dextrose 5%. 1000 milliLiter(s) (100 mL/Hr) IV Continuous <Continuous>  dextrose 50% Injectable 25 Gram(s) IV Push once  dextrose 50% Injectable 12.5 Gram(s) IV Push once  dextrose 50% Injectable 25 Gram(s) IV Push once  enoxaparin Injectable 40 milliGRAM(s) SubCutaneous every 24 hours  gabapentin 300 milliGRAM(s) Oral two times a day  glucagon  Injectable 1 milliGRAM(s) IntraMuscular once  influenza   Vaccine 0.5 milliLiter(s) IntraMuscular once  insulin lispro (ADMELOG) corrective regimen sliding scale   SubCutaneous three times a day before meals  insulin lispro (ADMELOG) corrective regimen sliding scale   SubCutaneous at bedtime  lactated ringers. 1000 milliLiter(s) (100 mL/Hr) IV Continuous <Continuous>  LORazepam     Tablet 0.5 milliGRAM(s) Oral once  melatonin 3 milliGRAM(s) Oral at bedtime  methadone    Tablet 2.5 milliGRAM(s) Oral two times a day  montelukast 10 milliGRAM(s) Oral at bedtime  multivitamin 1 Tablet(s) Oral daily  ondansetron   Disintegrating Tablet 4 milliGRAM(s) Oral every 6 hours  pantoprazole    Tablet 40 milliGRAM(s) Oral before breakfast  polyethylene glycol 3350 17 Gram(s) Oral daily  senna 2 Tablet(s) Oral at bedtime    MEDICATIONS  (PRN):  acetaminophen     Tablet .. 650 milliGRAM(s) Oral every 6 hours PRN Temp greater or equal to 38C (100.4F), Mild Pain (1 - 3)  dextrose Oral Gel 15 Gram(s) Oral once PRN Blood Glucose LESS THAN 70 milliGRAM(s)/deciliter  oxyCODONE    IR 10 milliGRAM(s) Oral every 4 hours PRN Moderate Pain (4 - 6)  oxyCODONE    IR 15 milliGRAM(s) Oral every 4 hours PRN Severe Pain (7 - 10)  prochlorperazine   Injectable 5 milliGRAM(s) IV Push every 8 hours PRN refractory nausea      ITEMS UNCHECKED ARE NOT PRESENT    PRESENT SYMPTOMS: [ ]Unable to self-report due to altered mental status- see [ ] CPOT [ ] PAINADS [ ] RDOS  Source if other than patient:  [ ]Family   [ ]Team     Pain: [ x]yes [ ]no  QOL impact - severe  Location - left thigh                     Aggravating factors - movement   Quality - tingling   Radiation - left lower extremity and lower back  Timing- constant with intermittent worsening episodes   Severity (0-10 scale): 10  Minimal acceptable level / Pain Goal (0-10 scale):  7-8    Dyspnea:                           [ ]Mild [ ]Moderate [ ]Severe  Anxiety:                             [ ]Mild [ ]Moderate [ ]Severe  Agitation:                          [ ]Mild [ ]Moderate [ ]Severe  Fatigue:                             [ ]Mild [ ]Moderate [ ]Severe  Nausea:                             [ ]Mild [ ]Moderate [ ]Severe  Loss of appetite:              [ ]Mild [ ]Moderate [ ]Severe  Constipation:                   [ ]Mild [ ]Moderate [ ]Severe  Diarrhea:                          [ ]Mild [ ]Moderate [ ]Severe    CPOT:    https://www.[email or web address]/getattachment/[email or web address]/Critical-Care-Pain-Observation-Tool-(CPOT)    PCSSQ[Palliative Care Spiritual Screening Question]   Severity (0-10):  Score of 4 or > indicate consideration of Chaplaincy referral.  Chaplaincy Referral: [ ] yes [ ] refused [ ] following [x ] deferred    Caregiver [place]? : [ ] yes [ ] no [ ] Declined [x ] Deferred              Social work referral [ ] Patient & Family Centered Care Referral [ ]     Anticipatory Grief present?:  [ ] yes [ ] no  [ x] Deferred                  Social work referral [ ] Chaplaincy Referral[ ]    Other Symptoms:  [ x]All other review of systems negative     PHYSICAL EXAM:  Vital Signs Last 24 Hrs  T(C): 36.8 (05 Jan 2023 09:35), Max: 37.1 (04 Jan 2023 22:54)  T(F): 98.2 (05 Jan 2023 09:35), Max: 98.7 (04 Jan 2023 22:54)  HR: 62 (05 Jan 2023 09:35) (62 - 71)  BP: 93/55 (05 Jan 2023 09:35) (87/53 - 115/72)  BP(mean): --  RR: 18 (05 Jan 2023 09:35) (16 - 18)  SpO2: 98% (05 Jan 2023 09:35) (98% - 100%)    Parameters below as of 05 Jan 2023 09:35  Patient On (Oxygen Delivery Method): room air       GENERAL:  [x ]Alert  [x ]Oriented x 3  [ ]Lethargic  [ ]Cachexia  [ ]Unarousable  [x ]Verbal  [ ]Non-Verbal    Behavioral:   [ ] Anxiety  [ ] Delirium [ ] Agitation [ x] Calm  [ ] Other  HEENT:  [ x]Normal  [ ] Temporal Wasting  [ ]Dry mouth   [ ]ET Tube/Trach  [ ]Oral lesions  [ ] Mucositis  PULMONARY:   [ x]Clear [ ]Tachypnea  [ ]Audible excessive secretions   [ ]Rhonchi        [ ]Right [ ]Left [ ]Bilateral  [ ]Crackles        [ ]Right [ ]Left [ ]Bilateral  [ ]Wheezing     [ ]Right [ ]Left [ ]Bilateral  [ ]Diminished breath sounds [ ]right [ ]left [ ]bilateral  CARDIOVASCULAR:    [ x]Regular [ ]Irregular [ ]Tachy  [ ]Dmitry [ ]Murmur [ ]Other  GASTROINTESTINAL:  [ x]Soft  [ ]Distended   [ ]+BS  [x ]Non tender [ ]Tender  [ ]PEG [ ]OGT/ NGT  Last BM: yesterday per patient   GENITOURINARY:  [x ]Normal [ ] Incontinent   [ ]Oliguria/Anuria   [ ][place]  MUSCULOSKELETAL:   [x ]Normal , except left lower extremity movement limited due to pain  [ ]Weakness  [ ]Bed/Wheelchair bound [ ]Edema  [  ] amputation  [  ] contraction  NEUROLOGIC:   [ x]No focal deficits  [ ]Cognitive impairment  [ ]Dysphagia [ ]Dysarthria [ ]Paresis [ ]Other   SKIN: See Nursing Skin Assessment for further details  [x ]Normal    [ ]Rash  [ ]Pressure ulcer(s)       Present on admission [ ]y [ ]n   [  ]  Wound    [  ] hyperpigmentation      CRITICAL CARE:  [ ]Shock Present  [ ]Septic [ ]Cardiogenic [ ]Neurologic [ ]Hypovolemic  [ ]Vasopressors [ ]Inotropes  [ ]Respiratory failure present [ ]Mechanical Ventilation [ ]Non-invasive ventilatory support [ ]High-Flow   [ ]Acute  [ ]Chronic [ ]Hypoxic  [ ]Hypercarbic [ ]Other  [ ]Other organ failure     LABS:                                          9.5    3.22  )-----------( 92       ( 05 Jan 2023 07:05 )             29.3       01-05    137  |  103  |  6<L>  ----------------------------<  128<H>  4.1   |  26  |  0.56    Ca    9.4      05 Jan 2023 07:05  Phos  4.2     01-05  Mg     1.90     01-05    TPro  7.4  /  Alb  3.7  /  TBili  0.6  /  DBili  x   /  AST  58<H>  /  ALT  50<H>  /  AlkPhos  265<H>  01-04      RADIOLOGY & ADDITIONAL STUDIES: reviewed     Protein Calorie Malnutrition Present: [ ]mild [ ]moderate [ ]severe [ ]underweight [ ]morbid obesity  https://www.andeal.org/vault/0750/web/files/ONC/Table_Clinical%20Characteristics%20to%20Document%20Malnutrition-White%20JV%20et%20al%202012.pdf    Height (cm): 149 (12-29-22 @ 22:41), 149 (12-27-22 @ 16:21), 147.3 (10-22-22 @ 08:57)  Weight (kg): 43.5 (12-27-22 @ 16:21), 47 (10-22-22 @ 08:57), 45.9 (07-20-22 @ 11:51)  BMI (kg/m2): 19.6 (12-29-22 @ 22:41), 19.6 (12-27-22 @ 16:21), 21.7 (10-22-22 @ 08:57)    [ ]PPSV2 < or = 30%  [ ]significant weight loss [ ]poor nutritional intake [ ]anasarca   [ ]Artificial Nutrition    REFERRALS:   [ ]Chaplaincy  [ ]Hospice  [ ]Child Life  [ ]Social Work  [ x]Case management [ ]Holistic Therapy

## 2023-01-05 NOTE — PROGRESS NOTE ADULT - PROBLEM SELECTOR PLAN 2
- pt with hx pancreatic CA f/w Dr. Katty Navarro @ [facility]; known mets to L spine and L femur, on chemo (ambraxane and gemzar) last dose 12/27 and started radiation to L spine early December x1 session   - appreciate rad on, nsx and med onc recs   - awaiting MRI, plan for inpt RT once MRI resulted-- called to expedite  - CT pending

## 2023-01-05 NOTE — PROGRESS NOTE ADULT - PROBLEM SELECTOR PLAN 5
Symptom management recommendations discussed with primary team   Thank you for allowing us to participate in your patient's care. Please page [phone] for any questions/concerns.

## 2023-01-05 NOTE — PROGRESS NOTE ADULT - PROBLEM SELECTOR PLAN 1
- possibly related to underlying malignancy; patient possibly with orthostasis like symptoms in setting of poor PO intake   - IV Zofran 4mg q8 ATC   - IV Compazine or IV Reglan PRN  - Low suspicion for bowel obstruction as having bowel movements, but recommend XRAY Abdomen for rule out SBO   - Recommend trial of IVF

## 2023-01-05 NOTE — CHART NOTE - NSCHARTNOTEFT_GEN_A_CORE
Ms. Lagunas, 57 y.o. F with metastatic pancreatic cancer s/p Whipple prior, mets to lung s/p RT to 45gy 10/2022, with LLE pain.  She is pending pelvic imaging and then evaluation by ortho/nsg for further recommendations if she may require surgical stabilization  prior to any possible palliative RT.           < from: NM Bone Imaging Total (12.23.22 @ 12:23) >    IMPRESSION: Abnormal bone scan.    Abnormal foci in the left femoral neck and lumbar spine highly suspicious   for osseous metastases.    Osseous lesion in the left femoral neck places the patient at risk for   pathologic fracture.    < end of copied text >        < from: CT Abdomen and Pelvis w/ IV Cont (12.23.22 @ 11:42) >    IMPRESSION:  No gross evidence for recurrent or metastatic disease.    Focal patchy opacities in the superior segment of the left lower lobe not   seen previously and in the left upper lobe, raising concern for   infection. Previously visualized more diffuse groundglass opacity in the   left upper lobe is no longer identified. Please correlate clinically.   Trace left pleural fluid has decreased.    < end of copied text >

## 2023-01-05 NOTE — PROGRESS NOTE ADULT - ASSESSMENT
58 yo F with PMH pancreat CA (mets to L spine, L femur; Cong, chemo ambraxane/gemzar 12/27, radiation ?12/15), T2D, GERD, asthma, and HTN p/w LLE pain x 1mo with difficulty ambulating x1wk. Likely in the setting of known metastatic disease.  Palliative Care consulted for evaluation and management of pain/ symptoms

## 2023-01-06 ENCOUNTER — TRANSCRIPTION ENCOUNTER (OUTPATIENT)
Age: 58
End: 2023-01-06

## 2023-01-06 LAB
ANION GAP SERPL CALC-SCNC: 9 MMOL/L — SIGNIFICANT CHANGE UP (ref 7–14)
BUN SERPL-MCNC: 6 MG/DL — LOW (ref 7–23)
CALCIUM SERPL-MCNC: 10.1 MG/DL — SIGNIFICANT CHANGE UP (ref 8.4–10.5)
CHLORIDE SERPL-SCNC: 102 MMOL/L — SIGNIFICANT CHANGE UP (ref 98–107)
CO2 SERPL-SCNC: 27 MMOL/L — SIGNIFICANT CHANGE UP (ref 22–31)
CREAT SERPL-MCNC: 0.53 MG/DL — SIGNIFICANT CHANGE UP (ref 0.5–1.3)
EGFR: 108 ML/MIN/1.73M2 — SIGNIFICANT CHANGE UP
GLUCOSE BLDC GLUCOMTR-MCNC: 123 MG/DL — HIGH (ref 70–99)
GLUCOSE BLDC GLUCOMTR-MCNC: 134 MG/DL — HIGH (ref 70–99)
GLUCOSE BLDC GLUCOMTR-MCNC: 162 MG/DL — HIGH (ref 70–99)
GLUCOSE BLDC GLUCOMTR-MCNC: 179 MG/DL — HIGH (ref 70–99)
GLUCOSE SERPL-MCNC: 111 MG/DL — HIGH (ref 70–99)
HCT VFR BLD CALC: 31.1 % — LOW (ref 34.5–45)
HGB BLD-MCNC: 9.9 G/DL — LOW (ref 11.5–15.5)
MAGNESIUM SERPL-MCNC: 1.8 MG/DL — SIGNIFICANT CHANGE UP (ref 1.6–2.6)
MCHC RBC-ENTMCNC: 25.1 PG — LOW (ref 27–34)
MCHC RBC-ENTMCNC: 31.8 GM/DL — LOW (ref 32–36)
MCV RBC AUTO: 78.9 FL — LOW (ref 80–100)
NRBC # BLD: 0 /100 WBCS — SIGNIFICANT CHANGE UP (ref 0–0)
NRBC # FLD: 0 K/UL — SIGNIFICANT CHANGE UP (ref 0–0)
PHOSPHATE SERPL-MCNC: 4.7 MG/DL — HIGH (ref 2.5–4.5)
PLATELET # BLD AUTO: 90 K/UL — LOW (ref 150–400)
POTASSIUM SERPL-MCNC: 4 MMOL/L — SIGNIFICANT CHANGE UP (ref 3.5–5.3)
POTASSIUM SERPL-SCNC: 4 MMOL/L — SIGNIFICANT CHANGE UP (ref 3.5–5.3)
RBC # BLD: 3.94 M/UL — SIGNIFICANT CHANGE UP (ref 3.8–5.2)
RBC # FLD: 15.7 % — HIGH (ref 10.3–14.5)
SODIUM SERPL-SCNC: 138 MMOL/L — SIGNIFICANT CHANGE UP (ref 135–145)
WBC # BLD: 3.34 K/UL — LOW (ref 3.8–10.5)
WBC # FLD AUTO: 3.34 K/UL — LOW (ref 3.8–10.5)

## 2023-01-06 PROCEDURE — 99233 SBSQ HOSP IP/OBS HIGH 50: CPT

## 2023-01-06 RX ORDER — POLYETHYLENE GLYCOL 3350 17 G/17G
17 POWDER, FOR SOLUTION ORAL
Refills: 0 | Status: DISCONTINUED | OUTPATIENT
Start: 2023-01-06 | End: 2023-02-03

## 2023-01-06 RX ORDER — SODIUM CHLORIDE 9 MG/ML
1000 INJECTION, SOLUTION INTRAVENOUS
Refills: 0 | Status: DISCONTINUED | OUTPATIENT
Start: 2023-01-06 | End: 2023-01-09

## 2023-01-06 RX ORDER — OXYCODONE HYDROCHLORIDE 5 MG/1
5 TABLET ORAL EVERY 6 HOURS
Refills: 0 | Status: DISCONTINUED | OUTPATIENT
Start: 2023-01-06 | End: 2023-01-09

## 2023-01-06 RX ADMIN — Medication 400 UNIT(S): at 12:06

## 2023-01-06 RX ADMIN — Medication 2: at 17:48

## 2023-01-06 RX ADMIN — PANTOPRAZOLE SODIUM 40 MILLIGRAM(S): 20 TABLET, DELAYED RELEASE ORAL at 05:36

## 2023-01-06 RX ADMIN — GABAPENTIN 300 MILLIGRAM(S): 400 CAPSULE ORAL at 17:46

## 2023-01-06 RX ADMIN — GABAPENTIN 300 MILLIGRAM(S): 400 CAPSULE ORAL at 05:34

## 2023-01-06 RX ADMIN — SENNA PLUS 2 TABLET(S): 8.6 TABLET ORAL at 21:51

## 2023-01-06 RX ADMIN — METHADONE HYDROCHLORIDE 2.5 MILLIGRAM(S): 40 TABLET ORAL at 17:46

## 2023-01-06 RX ADMIN — Medication 2: at 09:10

## 2023-01-06 RX ADMIN — SODIUM CHLORIDE 100 MILLILITER(S): 9 INJECTION, SOLUTION INTRAVENOUS at 04:02

## 2023-01-06 RX ADMIN — ONDANSETRON 4 MILLIGRAM(S): 8 TABLET, FILM COATED ORAL at 21:51

## 2023-01-06 RX ADMIN — Medication 3 MILLIGRAM(S): at 21:52

## 2023-01-06 RX ADMIN — ENOXAPARIN SODIUM 40 MILLIGRAM(S): 100 INJECTION SUBCUTANEOUS at 17:47

## 2023-01-06 RX ADMIN — Medication 5 MILLIGRAM(S): at 21:51

## 2023-01-06 RX ADMIN — POLYETHYLENE GLYCOL 3350 17 GRAM(S): 17 POWDER, FOR SOLUTION ORAL at 17:47

## 2023-01-06 RX ADMIN — Medication 1 TABLET(S): at 12:06

## 2023-01-06 RX ADMIN — OXYCODONE HYDROCHLORIDE 5 MILLIGRAM(S): 5 TABLET ORAL at 13:04

## 2023-01-06 RX ADMIN — METHADONE HYDROCHLORIDE 2.5 MILLIGRAM(S): 40 TABLET ORAL at 05:34

## 2023-01-06 RX ADMIN — MONTELUKAST 10 MILLIGRAM(S): 4 TABLET, CHEWABLE ORAL at 21:51

## 2023-01-06 NOTE — DISCHARGE NOTE PROVIDER - NSFOLLOWUPCLINICS_GEN_ALL_ED_FT
[facility]  Hematology/Oncology  [address] [id number]  Phone: [phone]  Fax:

## 2023-01-06 NOTE — PROGRESS NOTE ADULT - ASSESSMENT
Pt is a 58 yo F with PMH pancreat CA (mets to L spine, L femur; Cong, chemo ambraxane/gemzar 12/27, radiation ?12/15 and s/p Whipple in 2017), T2D, GERD, asthma, and HTN p/w LLE pain x 1mo with difficulty ambulating x1wk. Likely in the setting of known metastatic disease, XR showed lucency on the L femoral neck this admission. Rad onc planning RT after MRI, completed 1/5, now requesting eval by Ortho for possible surgical stabilization prior to RT.  F/u Ortho [ ] and Rad onc [ ].    Course complicated by ongoing severe pain, nausea, vomiting and constipation. Currently on narcotic regimen as below, Palliative care consulted. Pt is not getting her PRNs. For nausea, escalated to scheduled zofran q 6 hrs and IV compazine for refractory nausea PRN. Continue aggressive bowel regimen, requiring daily suppositories, last BM on 1/4. Abd XR [no obstruction, significant gas ]. Vomiting has improved on 1/5. Enema given 1/6. Ongoing pain on 1/6, addition of scheduled 5 mg PO oxycodone and reassess.

## 2023-01-06 NOTE — DISCHARGE NOTE PROVIDER - NSDCFUADDAPPT_GEN_ALL_CORE_FT
Follow up with your primary care provider in 1-2 weeks of discharge.    Follow up with Dr. Navarro at [facility] on discharge. Follow up with your primary care provider in 1-2 weeks of discharge.    Follow up with Dr. Navarro at [facility] on discharge.    Follow up with Dr. Mcarthur on discharge.  Follow up with your primary care provider in 1-2 weeks of discharge.    Follow up with Dr. Navarro at [facility] on discharge.    Follow up with Dr. Mcarthur on Monday 2/13/2022 at 12pm.

## 2023-01-06 NOTE — PROGRESS NOTE ADULT - PROBLEM SELECTOR PLAN 1
- Patient with pancreatic CA (mets to L spine, L femur) last chemo on 12/27  - NM Bone scan - Abnormal foci in the left femoral neck and lumbar spine highly suspicious for osseous metastases.  Osseous lesion in the left femoral neck places the patient at risk for pathologic fracture.  - Oncology recommendations appreciated.

## 2023-01-06 NOTE — DISCHARGE NOTE PROVIDER - PROVIDER TOKENS
PROVIDER:[TOKEN:[[id number]]] PROVIDER:[TOKEN:[[id number]]],PROVIDER:[TOKEN:[[id number]]] PROVIDER:[TOKEN:[[id number]]],PROVIDER:[TOKEN:[[id number]]],PROVIDER:[TOKEN:[[id number]],SCHEDULEDAPPT:[02/13/2023],SCHEDULEDAPPTTIME:[12:00 PM]]

## 2023-01-06 NOTE — PROGRESS NOTE ADULT - ASSESSMENT
Pt is a 56 yo F with PMH pancreat CA (mets to L spine, L femur; Cong, chemo abraxane/gemzar 12/27, T2D, GERD, asthma, and HTN p/w LLE pain x 1mo with difficulty ambulating x1wk. Likely in the setting of known metastatic disease. Admitted for PT eval and pain control . Oncology consulted for further recommendations        CTAP 12/23  No gross evidence for recurrent or metastatic disease.  Focal patchy opacities in the superior segment of the left lower lobe not   seen previously and in the left upper lobe, raising concern for   infection. Previously visualized more diffuse groundglass opacity in the   left upper lobe is no longer identified. Please correlate clinically.   Trace left pleural fluid has decreased.    Nuclear Bone Scan 12/23:   IMPRESSION: Abnormal bone scan.  Abnormal foci in the left femoral neck and lumbar spine highly suspicious   for osseous metastases.  Osseous lesion in the left femoral neck places the patient at risk for   pathologic fracture.    MR pelvis 1/5/23  Metastatic lesions within the left femoral neck and superior   articular facet of L5. The lesion in the left femoral neck likely places   the patient at increased risk for pathologic fracture.  Small left hip effusion.        Appreciate Ortho consult , as per chart, family prefers to proceed with XRT in hopes of improvement and re-visit surgical option if pt doesn't feel improvement after radiation  Will followup further Radiation Oncology Consult recommendations  Appreciate  Palliative Care consult for pain management  Monitor CBC daily, pt counts may go down in the setting of recent chemo  No plans for inpatient chemotherapy  -Rest of care as per primary team  -Patient to followup with Dr. Lewis ([facility]) upon discharge  -C/w Supportive care, pain control, Nutrition, PT, DVT ppx  -Oncology will continue to follow with you      Case discussed with Dr. Leslie CASTLE  Oncology Physician Assistant  Althea DICKNES/KAREN [facility]  Pager [phone] also available on Teams    If before 8am/after 5pm or on weekends please page On-call Oncology Fellow       Pt is a 56 yo F with PMH pancreat CA (mets to L spine, L femur; Cong, chemo abraxane/gemzar 12/27, T2D, GERD, asthma, and HTN p/w LLE pain x 1mo with difficulty ambulating x1wk. Likely in the setting of known metastatic disease. Admitted for PT eval and pain control . Oncology consulted for further recommendations    CTAP 12/23  No gross evidence for recurrent or metastatic disease.  Focal patchy opacities in the superior segment of the left lower lobe not   seen previously and in the left upper lobe, raising concern for   infection. Previously visualized more diffuse groundglass opacity in the   left upper lobe is no longer identified. Please correlate clinically.   Trace left pleural fluid has decreased.    Nuclear Bone Scan 12/23:   IMPRESSION: Abnormal bone scan.  Abnormal foci in the left femoral neck and lumbar spine highly suspicious   for osseous metastases.  Osseous lesion in the left femoral neck places the patient at risk for   pathologic fracture.    MR pelvis 1/5/23  Metastatic lesions within the left femoral neck and superior   articular facet of L5. The lesion in the left femoral neck likely places   the patient at increased risk for pathologic fracture.  Small left hip effusion.      Appreciate Ortho consult , as per chart, family prefers to proceed with XRT in hopes of improvement and re-visit surgical option if pt doesn't feel improvement after radiation  Will followup further Radiation Oncology Consult recommendations  Appreciate  Palliative Care consult for pain management  Monitor CBC daily, pt counts may go down in the setting of recent chemo  No plans for inpatient chemotherapy  -Rest of care as per primary team  -Patient to followup with Dr. Lewis ([facility]) upon discharge  -C/w Supportive care, pain control, Nutrition, PT, DVT ppx  -Oncology will continue to follow with you      Case discussed with Dr. Leslie CASTLE  Oncology Physician Assistant  Althea DICKENS/KAREN [facility]  Pager [phone] also available on Teams    If before 8am/after 5pm or on weekends please page On-call Oncology Fellow       Pt is a 58 yo F with PMH pancreat CA (mets to L spine, L femur; Cong, chemo abraxane/gemzar 12/27, T2D, GERD, asthma, and HTN p/w LLE pain x 1mo with difficulty ambulating x1wk. Likely in the setting of known metastatic disease. Admitted for PT eval and pain control . Oncology consulted for further recommendations    CTAP 12/23  No gross evidence for recurrent or metastatic disease.  Focal patchy opacities in the superior segment of the left lower lobe not   seen previously and in the left upper lobe, raising concern for   infection. Previously visualized more diffuse groundglass opacity in the   left upper lobe is no longer identified. Please correlate clinically.   Trace left pleural fluid has decreased.    Nuclear Bone Scan 12/23:   IMPRESSION: Abnormal bone scan.  Abnormal foci in the left femoral neck and lumbar spine highly suspicious   for osseous metastases.  Osseous lesion in the left femoral neck places the patient at risk for   pathologic fracture.    MR pelvis 1/5/23  Metastatic lesions within the left femoral neck and superior   articular facet of L5. The lesion in the left femoral neck likely places   the patient at increased risk for pathologic fracture.  Small left hip effusion.      Appreciate Ortho consult , as per chart, family prefers to proceed with XRT in hopes of improvement and re-visit surgical option if pt doesn't feel improvement after radiation  Will followup further Radiation Oncology Consult recommendations  Appreciate  Palliative Care consult for pain management  Monitor CBC daily, pt counts may go down in the setting of recent chemo  No plans for inpatient chemotherapy  -Rest of care as per primary team  -Patient to followup with Dr. Lewis ([facility]) upon discharge  -C/w Supportive care, pain control, Nutrition, PT, DVT ppx  -Oncology will continue to follow with you      Case discussed with Dr. Roseanne CASTLE  Oncology Physician Assistant  Althea DICKENS/KAREN [facility]  Pager [phone] also available on Teams    If before 8am/after 5pm or on weekends please page On-call Oncology Fellow

## 2023-01-06 NOTE — PROGRESS NOTE ADULT - PROBLEM SELECTOR PLAN 2
- pt with hx pancreatic CA f/w Dr. Katty Navarro @ [facility]; known mets to L spine and L femur, on chemo (ambraxane and gemzar) last dose 12/27 and started radiation to L spine early December x1 session   - appreciate rad on, nsx and med onc recs   - awaiting MRI, plan for inpt RT once MRI resulted-- called to expedite- completed 1/5  - holding off CT spine as cord compression is not as much of a concern

## 2023-01-06 NOTE — PROGRESS NOTE ADULT - SUBJECTIVE AND OBJECTIVE BOX
INTERVAL HPI/OVERNIGHT EVENTS:  Patient seen at bedside.  Patient accompanied by her dtr    VITAL SIGNS:  T(F): 97.9 (01-06-23 @ 09:33)  HR: 60 (01-06-23 @ 09:33)  BP: 90/52 (01-06-23 @ 09:33)  RR: 18 (01-06-23 @ 09:33)  SpO2: 100% (01-06-23 @ 09:33)  Wt(kg): --    PHYSICAL EXAM:    CONSTITUTIONAL: NAD, cachectic   EYES: EOMI; conjunctiva and sclera clear  ENMT: Moist oral mucosa  RESPIRATORY: Normal respiratory effort; lungs are clear to auscultation bilaterally  CARDIOVASCULAR: Regular rate and rhythm, normal S1 and S2, no murmur; No lower extremity edema  ABDOMEN: Nontender to palpation, normoactive bowel sounds, no rebound/guarding; surgical scar extending from umbilicus to sternum, w/d/i  MUSCULOSKELETAL:   no clubbing or cyanosis of digits; no joint swelling or tenderness to palpation; significant decreased ROM of the LLE (cannot lift off of the bed). flexion and extension of the foot intact; full ROM of the RLE  PSYCH: A+O to person, place, and time; affect appropriate  NEUROLOGY: CN 2-12 are intact and symmetric; no gross sensory deficits   SKIN: No rashes; no palpable lesions      MEDICATIONS  (STANDING):  bisacodyl 5 milliGRAM(s) Oral at bedtime  cholecalciferol 400 Unit(s) Oral daily  dextrose 5%. 1000 milliLiter(s) (50 mL/Hr) IV Continuous <Continuous>  dextrose 5%. 1000 milliLiter(s) (100 mL/Hr) IV Continuous <Continuous>  dextrose 50% Injectable 25 Gram(s) IV Push once  dextrose 50% Injectable 12.5 Gram(s) IV Push once  dextrose 50% Injectable 25 Gram(s) IV Push once  enoxaparin Injectable 40 milliGRAM(s) SubCutaneous every 24 hours  gabapentin 300 milliGRAM(s) Oral two times a day  glucagon  Injectable 1 milliGRAM(s) IntraMuscular once  influenza   Vaccine 0.5 milliLiter(s) IntraMuscular once  insulin lispro (ADMELOG) corrective regimen sliding scale   SubCutaneous three times a day before meals  insulin lispro (ADMELOG) corrective regimen sliding scale   SubCutaneous at bedtime  lactated ringers. 1000 milliLiter(s) (100 mL/Hr) IV Continuous <Continuous>  LORazepam     Tablet 0.5 milliGRAM(s) Oral once  melatonin 3 milliGRAM(s) Oral at bedtime  methadone    Tablet 2.5 milliGRAM(s) Oral two times a day  montelukast 10 milliGRAM(s) Oral at bedtime  multivitamin 1 Tablet(s) Oral daily  ondansetron   Disintegrating Tablet 4 milliGRAM(s) Oral every 6 hours  oxyCODONE    IR 5 milliGRAM(s) Oral every 6 hours  pantoprazole    Tablet 40 milliGRAM(s) Oral before breakfast  polyethylene glycol 3350 17 Gram(s) Oral two times a day  senna 2 Tablet(s) Oral at bedtime    MEDICATIONS  (PRN):  acetaminophen     Tablet .. 650 milliGRAM(s) Oral every 6 hours PRN Temp greater or equal to 38C (100.4F), Mild Pain (1 - 3)  dextrose Oral Gel 15 Gram(s) Oral once PRN Blood Glucose LESS THAN 70 milliGRAM(s)/deciliter  oxyCODONE    IR 10 milliGRAM(s) Oral every 4 hours PRN Moderate Pain (4 - 6)  oxyCODONE    IR 15 milliGRAM(s) Oral every 4 hours PRN Severe Pain (7 - 10)  prochlorperazine   Injectable 5 milliGRAM(s) IV Push every 8 hours PRN refractory nausea      Allergies    No Known Allergies    Intolerances        LABS:                        9.9    3.34  )-----------( 90       ( 06 Jan 2023 06:18 )             31.1     01-06    138  |  102  |  6<L>  ----------------------------<  111<H>  4.0   |  27  |  0.53    Ca    10.1      06 Jan 2023 06:18  Phos  4.7     01-06  Mg     1.80     01-06            RADIOLOGY & ADDITIONAL TESTS:  Studies reviewed.

## 2023-01-06 NOTE — PROGRESS NOTE ADULT - SUBJECTIVE AND OBJECTIVE BOX
SUBJECTIVE AND OBJECTIVE:  Patient seen and examined at bedside. Patient being followed up for pain.   Patient declined Swedish ; preferred for daughter at bedside to translate.   Patient reports having intermittent pain; daughter states she has been attempting to encourage patient to take prn doses of pain medication if necessary.     INTERVAL HPI/OVERNIGHT EVENTS:  Received 0 prn doses of oxycodone in past 24 hours   MRI completed overnight.     Allergies    No Known Allergies    Intolerances    MEDICATIONS  (STANDING):  bisacodyl 5 milliGRAM(s) Oral at bedtime  cholecalciferol 400 Unit(s) Oral daily  dextrose 5%. 1000 milliLiter(s) (50 mL/Hr) IV Continuous <Continuous>  dextrose 5%. 1000 milliLiter(s) (100 mL/Hr) IV Continuous <Continuous>  dextrose 50% Injectable 25 Gram(s) IV Push once  dextrose 50% Injectable 12.5 Gram(s) IV Push once  dextrose 50% Injectable 25 Gram(s) IV Push once  enoxaparin Injectable 40 milliGRAM(s) SubCutaneous every 24 hours  gabapentin 300 milliGRAM(s) Oral two times a day  glucagon  Injectable 1 milliGRAM(s) IntraMuscular once  influenza   Vaccine 0.5 milliLiter(s) IntraMuscular once  insulin lispro (ADMELOG) corrective regimen sliding scale   SubCutaneous three times a day before meals  insulin lispro (ADMELOG) corrective regimen sliding scale   SubCutaneous at bedtime  lactated ringers. 1000 milliLiter(s) (100 mL/Hr) IV Continuous <Continuous>  LORazepam     Tablet 0.5 milliGRAM(s) Oral once  melatonin 3 milliGRAM(s) Oral at bedtime  methadone    Tablet 2.5 milliGRAM(s) Oral two times a day  montelukast 10 milliGRAM(s) Oral at bedtime  multivitamin 1 Tablet(s) Oral daily  ondansetron   Disintegrating Tablet 4 milliGRAM(s) Oral every 6 hours  oxyCODONE    IR 5 milliGRAM(s) Oral every 6 hours  pantoprazole    Tablet 40 milliGRAM(s) Oral before breakfast  polyethylene glycol 3350 17 Gram(s) Oral two times a day  senna 2 Tablet(s) Oral at bedtime    MEDICATIONS  (PRN):  acetaminophen     Tablet .. 650 milliGRAM(s) Oral every 6 hours PRN Temp greater or equal to 38C (100.4F), Mild Pain (1 - 3)  dextrose Oral Gel 15 Gram(s) Oral once PRN Blood Glucose LESS THAN 70 milliGRAM(s)/deciliter  oxyCODONE    IR 10 milliGRAM(s) Oral every 4 hours PRN Moderate Pain (4 - 6)  oxyCODONE    IR 15 milliGRAM(s) Oral every 4 hours PRN Severe Pain (7 - 10)  prochlorperazine   Injectable 5 milliGRAM(s) IV Push every 8 hours PRN refractory nausea    ITEMS UNCHECKED ARE NOT PRESENT    PRESENT SYMPTOMS: [ ]Unable to self-report due to altered mental status- see [ ] CPOT [ ] PAINADS [ ] RDOS  Source if other than patient:  [ ]Family   [ ]Team     Pain: [ x]yes [ ]no  QOL impact - severe  Location - left thigh                     Aggravating factors - movement   Quality - tingling   Radiation - left lower extremity and lower back  Timing- constant with intermittent worsening episodes   Severity (0-10 scale): 10  Minimal acceptable level / Pain Goal (0-10 scale):  7-8    Dyspnea:                           [ ]Mild [ ]Moderate [ ]Severe  Anxiety:                             [ ]Mild [ ]Moderate [ ]Severe  Agitation:                          [ ]Mild [ ]Moderate [ ]Severe  Fatigue:                             [ ]Mild [ ]Moderate [ ]Severe  Nausea:                             [ ]Mild [ ]Moderate [ ]Severe  Loss of appetite:              [ ]Mild [ ]Moderate [ ]Severe  Constipation:                   [ ]Mild [ ]Moderate [ ]Severe  Diarrhea:                          [ ]Mild [ ]Moderate [ ]Severe    CPOT:    https://www.[facility].org/getattachment/[email or web address]-[email or web address]/Critical-Care-Pain-Observation-Tool-(CPOT)    PCSSQ[Palliative Care Spiritual Screening Question]   Severity (0-10):  Score of 4 or > indicate consideration of Chaplaincy referral.  Chaplaincy Referral: [ ] yes [ ] refused [ ] following [x ] deferred    Caregiver [place]? : [ ] yes [ ] no [ ] Declined [x ] Deferred              Social work referral [ ] Patient & Family Centered Care Referral [ ]     Anticipatory Grief present?:  [ ] yes [ ] no  [ x] Deferred                  Social work referral [ ] Chaplaincy Referral[ ]    Other Symptoms:  [ x]All other review of systems negative     PHYSICAL EXAM:  Vital Signs Last 24 Hrs  T(C): 36.6 (06 Jan 2023 09:33), Max: 36.9 (05 Jan 2023 18:10)  T(F): 97.9 (06 Jan 2023 09:33), Max: 98.5 (05 Jan 2023 22:14)  HR: 60 (06 Jan 2023 09:33) (60 - 69)  BP: 90/52 (06 Jan 2023 09:33) (90/52 - 105/62)  BP(mean): --  RR: 18 (06 Jan 2023 09:33) (17 - 18)  SpO2: 100% (06 Jan 2023 09:33) (98% - 100%)    Parameters below as of 06 Jan 2023 09:33  Patient On (Oxygen Delivery Method): room air      GENERAL:  [x ]Alert  [x ]Oriented x 3  [ ]Lethargic  [ ]Cachexia  [ ]Unarousable  [x ]Verbal  [ ]Non-Verbal    Behavioral:   [ ] Anxiety  [ ] Delirium [ ] Agitation [ x] Calm  [ ] Other  HEENT:  [ x]Normal  [ ] Temporal Wasting  [ ]Dry mouth   [ ]ET Tube/Trach  [ ]Oral lesions  [ ] Mucositis  PULMONARY:   [ x]Clear [ ]Tachypnea  [ ]Audible excessive secretions   [ ]Rhonchi        [ ]Right [ ]Left [ ]Bilateral  [ ]Crackles        [ ]Right [ ]Left [ ]Bilateral  [ ]Wheezing     [ ]Right [ ]Left [ ]Bilateral  [ ]Diminished breath sounds [ ]right [ ]left [ ]bilateral  CARDIOVASCULAR:    [ x]Regular [ ]Irregular [ ]Tachy  [ ]Dmitry [ ]Murmur [ ]Other  GASTROINTESTINAL:  [ x]Soft  [ ]Distended   [ ]+BS  [x ]Non tender [ ]Tender  [ ]PEG [ ]OGT/ NGT  Last BM: yesterday per patient   GENITOURINARY:  [x ]Normal [ ] Incontinent   [ ]Oliguria/Anuria   [ ][place]  MUSCULOSKELETAL:   [x ]Normal , except left lower extremity movement limited due to pain  [ ]Weakness  [ ]Bed/Wheelchair bound [ ]Edema  [  ] amputation  [  ] contraction  NEUROLOGIC:   [ x]No focal deficits  [ ]Cognitive impairment  [ ]Dysphagia [ ]Dysarthria [ ]Paresis [ ]Other   SKIN: See Nursing Skin Assessment for further details  [x ]Normal    [ ]Rash  [ ]Pressure ulcer(s)       Present on admission [ ]y [ ]n   [  ]  Wound    [  ] hyperpigmentation      CRITICAL CARE:  [ ]Shock Present  [ ]Septic [ ]Cardiogenic [ ]Neurologic [ ]Hypovolemic  [ ]Vasopressors [ ]Inotropes  [ ]Respiratory failure present [ ]Mechanical Ventilation [ ]Non-invasive ventilatory support [ ]High-Flow   [ ]Acute  [ ]Chronic [ ]Hypoxic  [ ]Hypercarbic [ ]Other  [ ]Other organ failure     LABS:                                                     9.9    3.34  )-----------( 90       ( 06 Jan 2023 06:18 )             31.1       01-06    138  |  102  |  6<L>  ----------------------------<  111<H>  4.0   |  27  |  0.53    Ca    10.1      06 Jan 2023 06:18  Phos  4.7     01-06  Mg     1.80     01-06      RADIOLOGY & ADDITIONAL STUDIES: reviewed     Protein Calorie Malnutrition Present: [ ]mild [ ]moderate [ ]severe [ ]underweight [ ]morbid obesity  https://www.andeal.org/vault/2440/web/files/ONC/Table_Clinical%20Characteristics%20to%20Document%20Malnutrition-White%20JV%20et%20al%202012.pdf    Height (cm): 149 (12-29-22 @ 22:41), 149 (12-27-22 @ 16:21), 147.3 (10-22-22 @ 08:57)  Weight (kg): 43.5 (12-27-22 @ 16:21), 47 (10-22-22 @ 08:57), 45.9 (07-20-22 @ 11:51)  BMI (kg/m2): 19.6 (12-29-22 @ 22:41), 19.6 (12-27-22 @ 16:21), 21.7 (10-22-22 @ 08:57)    [ ]PPSV2 < or = 30%  [ ]significant weight loss [ ]poor nutritional intake [ ]anasarca   [ ]Artificial Nutrition    REFERRALS:   [ ]Chaplaincy  [ ]Hospice  [ ]Child Life  [ ]Social Work  [ x]Case management [ ]Holistic Therapy

## 2023-01-06 NOTE — DISCHARGE NOTE PROVIDER - HOSPITAL COURSE
Pt is a 58 yo F with PMH pancreat CA (mets to L spine, L femur; Cong, chemo ambraxane/gemzar 12/27, radiation ?12/15 and s/p Whipple in 2017), T2D, GERD, asthma, and HTN p/w LLE pain x 1mo with difficulty ambulating x1wk. Likely in the setting of known metastatic disease, XR showed lucency on the L femoral neck this admission. Rad onc planning RT after MRI, completed 1/5, now requesting eval by Ortho for possible surgical stabilization prior to RT.  F/u Ortho [ ] and Rad onc [ ].    Course complicated by ongoing severe pain, nausea, vomiting and constipation. Currently on narcotic regimen as below, Palliative care consulted. Pt is not getting her PRNs. For nausea, escalated to scheduled zofran q 6 hrs and IV compazine for refractory nausea PRN. Continue aggressive bowel regimen, requiring daily suppositories, last BM on 1/4. Abd XR [no obstruction, significant gas ]. Vomiting has improved on 1/5. Enema given 1/6. Ongoing pain on 1/6, addition of scheduled 5 mg PO oxycodone and reassess.    58 yo Sylheti-speaking F with PMH pancreatic CA (mets to L spine, L femur; s/p chemo with abraxane/gemzar 12/27, radiation ?12/15; s/p Whipple in 2017), Type 2 DM, GERD, asthma, and HTN p/w LLE pain x 1mo with difficulty ambulating x1wk. Likely in the setting of known metastatic disease. XR showed lucency on the L femoral neck. Course c/b severe pain, N/V, constipation -- palliative following for pain recs. Ortho deferring surgical management of L femur until radiation completed. Rad-onc following, completed 5 sessions of RT. Course c/b hip fracture now s/p L hip hemiarthroplasty. Further complicated by acute blood loss anemia.    Problem/Plan - 1:  ·  Problem: Left thigh pain.   ·  Plan: P/w 1mo progressive LLE pain and difficulty ambulating x1wk; likely in setting of known L femur metastatic disease  -palliative care team evaluated patient and assisted with pain control regimen; patient received __________________  - Completed course of RT on 1/24.  - Course c/b  pathological femoral neck fracture; s/p L hip hemiarthroplasty by orthopedic surgery team on 1/26.     Problem/Plan - 2:  ·  Problem: Pancytopenia.   ·  Plan: - likely in setting of known malignancy (chronic disease) and recent chemotherapy  - patient with drop in Hgb following hip surgery, suspect acute blood loss anemia likely 2/2 L hip hemiarthroplasty; CT L hip and a/p w/o hemorrhage; transfused 2U pRBC on 1/27     Problem/Plan - 3:  ·  Problem: Transaminitis.   ·  Plan: - ALP at baseline per chart review     Problem/Plan - 4:  ·  Problem: Type 2 diabetes mellitus.   ·  Plan: - home meds: Januvia 100mg, metformin 500mg BID  - 10/2022 A1C 7.4%, well controlled     58 yo Sylheti-speaking F with PMH pancreatic CA (mets to L spine, L femur; s/p chemo with abraxane/gemzar 12/27, radiation ?12/15; s/p Whipple in 2017), Type 2 DM, GERD, asthma, and HTN p/w LLE pain x 1mo with difficulty ambulating x1wk. Likely in the setting of known metastatic disease. XR showed lucency on the L femoral neck. Course c/b severe pain, N/V, constipation -- palliative following for pain recs. Ortho deferring surgical management of L femur until radiation completed. Rad-onc following, completed 5 sessions of RT. Course c/b hip fracture now s/p L hip hemiarthroplasty. Further complicated by acute blood loss anemia.    Problem/Plan - 1:  ·  Problem: Left thigh pain.   ·  Plan: P/w 1mo progressive LLE pain and difficulty ambulating x1wk; likely in setting of known L femur metastatic disease  -palliative care team evaluated patient and assisted with pain control regimen  - Completed course of RT on 1/24.  - Course c/b  pathological femoral neck fracture; s/p L hip hemiarthroplasty by orthopedic surgery team on 1/26.     Problem/Plan - 2:  ·  Problem: Pancytopenia.   ·  Plan: - likely in setting of known malignancy (chronic disease) and recent chemotherapy  - patient with drop in Hgb following hip surgery, suspect acute blood loss anemia likely 2/2 L hip hemiarthroplasty; CT L hip and a/p w/o hemorrhage; transfused 2U pRBC on 1/27     Problem/Plan - 3:  ·  Problem: Transaminitis.   ·  Plan: - ALP at baseline per chart review     Problem/Plan - 4:  ·  Problem: Type 2 diabetes mellitus.   ·  Plan: - home meds: Januvia 100mg, metformin 500mg BID  - 10/2022 A1C 7.4%, well controlled

## 2023-01-06 NOTE — DISCHARGE NOTE PROVIDER - CARE PROVIDER_API CALL
Katty Navarro (DO)  HematologyOncology; Internal Medicine  [address] [place] [address]  Phone: [phone]  Fax: [phone]  Follow Up Time:    Katty Navarro (DO)  HematologyOncology; Internal Medicine  [address] [place] [address]  Phone: [phone]  Fax: [phone]  Follow Up Time:     Jacoby Villa)  Orthopaedic Surgery  [address] [place] [address] [address]  [place] [address]  Phone: [phone]  Fax: [phone]  Follow Up Time:    Katty Navarro (DO)  HematologyOncology; Internal Medicine  [address] [place] [address]  Phone: [phone]  Fax: [phone]  Follow Up Time:     Jacoby Villa)  Orthopaedic Surgery  [address] [place] [address] [address]  [place] [address]  Phone: [phone]  Fax: [phone]  Follow Up Time:     Leyda Sotelo)  [facility]  [address] [place] [address]  Phone: [phone]  Fax: [phone]  Scheduled Appointment: 02/13/2023 12:00 PM

## 2023-01-06 NOTE — DISCHARGE NOTE PROVIDER - NSD[facility]_MEDRECSTATUS_GEN_ALL_CORE
Left message informing mom she can  note at .   Admission Reconciliation is Completed  Discharge Reconciliation is Not Complete Admission Reconciliation is Completed  Discharge Reconciliation is Completed

## 2023-01-06 NOTE — PROGRESS NOTE ADULT - PROBLEM SELECTOR PLAN 2
- MRI - Metastatic lesions within the left femoral neck and superior articular facet of L5. The lesion in the left femoral neck likely places the patient at increased risk for pathologic fracture. Small left hip effusion.  - Ortho and Neurosurgery and Rad/onc recommendations appreciated   - Patient deferring surgical intervention at this time. Plan for inpatient RT.  - PO Methadone 2.5mg BID (QTC on 12/30 - 437)  - Patient started on Oxy IR 5mg q6 ATC by primary team as patient not asking for PRN despite having pain ; discussed with daughter that patient can refuse dose if she does not want to take it.   - PO Gabapentin 300mg BID; can consider increasing to Gabapentin 300mg TID   - PO Oxy IR 10mg q4 PRN moderate pain (hold for hypotension, oversedation, respiratory depression)  - PO Oxy IR 15mg q4PRN severe pain (hold for hypotension, oversedation, respiratory depression)  - Will reassess after the weekend if long acting regimen needs to be adjusted based on patient's pain requirements with prn and ATC Oxy IR 5mg as per primary team  - Bowel regimen while on opiates.

## 2023-01-06 NOTE — DISCHARGE NOTE PROVIDER - NSDCMRMEDTOKEN_GEN_ALL_CORE_FT
acetaminophen 325 mg oral tablet: 3 tab(s) orally every 6 hours, As Needed Pain  atorvastatin 10 mg oral tablet: 1 tab(s) orally once a day  Colace 2-in-1 50 mg-8.6 mg oral tablet: 3 tab(s) orally once a day (at bedtime)  Dulcolax Laxative 5 mg oral delayed release tablet: 1 tab(s) orally two times a day, As Needed constipation. This is an over the counter medication  gabapentin 300 mg oral capsule: orally 2 times a day  Januvia 100 mg oral tablet: 1 tab(s) orally once a day  metFORMIN 500 mg oral tablet: 1 tab(s) orally 2 times a day  MiraLax oral powder for reconstitution: 17 gram(s) orally once a day. Hold for losoe stool. This is an over the counter medication for constipation  montelukast 10 mg oral tablet: 1 tab(s) orally once a day (at bedtime)  Multiple Vitamins oral capsule: 1 cap(s) orally once a day  pantoprazole 40 mg oral delayed release tablet: 1 tab(s) orally once a day (before a meal) before breakfast   sucralfate 1 g oral tablet: 1 tab(s) orally 2 times a day  Vitamin D3 125 mcg (5000 intl units) oral capsule: 1 cap(s) orally once a day   acetaminophen 325 mg oral tablet: 3 tab(s) orally every 6 hours, As Needed Pain  atorvastatin 10 mg oral tablet: 1 tab(s) orally once a day  Colace 2-in-1 50 mg-8.6 mg oral tablet: 3 tab(s) orally once a day (at bedtime)  Dulcolax Laxative 5 mg oral delayed release tablet: 1 tab(s) orally two times a day, As Needed constipation. This is an over the counter medication  gabapentin 300 mg oral capsule: orally 2 times a day  Januvia 100 mg oral tablet: 1 tab(s) orally once a day  metFORMIN 500 mg oral tablet: 1 tab(s) orally 2 times a day  MiraLax oral powder for reconstitution: 17 gram(s) orally once a day. Hold for losoe stool. This is an over the counter medication for constipation  montelukast 10 mg oral tablet: 1 tab(s) orally once a day (at bedtime)  Multiple Vitamins oral capsule: 1 cap(s) orally once a day  pantoprazole 40 mg oral delayed release tablet: 1 tab(s) orally once a day (before a meal) before breakfast   Vitamin D3 125 mcg (5000 intl units) oral capsule: 1 cap(s) orally once a day   acetaminophen 325 mg oral tablet: 3 tab(s) orally every 6 hours, As Needed Pain  Colace 2-in-1 50 mg-8.6 mg oral tablet: 3 tab(s) orally once a day (at bedtime)  gabapentin 300 mg oral capsule: orally 2 times a day  Januvia 100 mg oral tablet: 1 tab(s) orally once a day  metFORMIN 500 mg oral tablet: 1 tab(s) orally 2 times a day  montelukast 10 mg oral tablet: 1 tab(s) orally once a day (at bedtime)  Multiple Vitamins oral capsule: 1 cap(s) orally once a day  outpatient physical therapy and outpatient occupational therapy : Outpatient physical therapy and outpatient occupational therapy 3x/week  pantoprazole 40 mg oral delayed release tablet: 1 tab(s) orally once a day (before a meal) before breakfast   Vitamin D3 125 mcg (5000 intl units) oral capsule: 1 cap(s) orally once a day   acetaminophen 325 mg oral tablet: 3 tab(s) orally every 6 hours, As Needed Pain  bisacodyl 5 mg oral delayed release tablet: 1 tab(s) orally once a day (at bedtime)  Hold for loose stool.  Colace 2-in-1 50 mg-8.6 mg oral tablet: 3 tab(s) orally once a day (at bedtime)  gabapentin 300 mg oral capsule: orally 2 times a day  Januvia 100 mg oral tablet: 1 tab(s) orally once a day  lidocaine 4% topical film: Apply topically to affected area once a day  metFORMIN 500 mg oral tablet: 1 tab(s) orally 2 times a day  methadone 10 mg oral tablet: 1 tab(s) orally every 12 hours MDD:2 tablets  MiraLax oral powder for reconstitution: 17 gram(s) orally once a day. Hold for loose stool.   montelukast 10 mg oral tablet: 1 tab(s) orally once a day (at bedtime)  Multiple Vitamins oral capsule: 1 cap(s) orally once a day  outpatient physical therapy and outpatient occupational therapy : Outpatient physical therapy and outpatient occupational therapy 3x/week  oxyCODONE 10 mg oral tablet: 1 tab(s) orally every 4 hours, As Needed -Severe Pain (7 - 10)  MDD:6 tablets   pantoprazole 40 mg oral delayed release tablet: 1 tab(s) orally once a day (before a meal) before breakfast   senna leaf extract oral tablet: 2 tab(s) orally once a day (at bedtime)  Hold for loose stool.  Vitamin D3 125 mcg (5000 intl units) oral capsule: 1 cap(s) orally once a day   acetaminophen 325 mg oral tablet: 3 tab(s) orally every 6 hours, As Needed Pain  bisacodyl 5 mg oral delayed release tablet: 1 tab(s) orally once a day (at bedtime)  Hold for loose stool.  gabapentin 300 mg oral capsule: orally 2 times a day  Januvia 100 mg oral tablet: 1 tab(s) orally once a day  lidocaine 4% topical film: Apply topically to affected area once a day  metFORMIN 500 mg oral tablet: 1 tab(s) orally 2 times a day  methadone 10 mg oral tablet: 1 tab(s) orally every 12 hours MDD:2 tablets  MiraLax oral powder for reconstitution: 17 gram(s) orally once a day. Hold for loose stool.   montelukast 10 mg oral tablet: 1 tab(s) orally once a day (at bedtime)  Multiple Vitamins oral capsule: 1 cap(s) orally once a day  outpatient physical therapy and outpatient occupational therapy : Outpatient physical therapy and outpatient occupational therapy 3x/week  oxyCODONE 10 mg oral tablet: 1 tab(s) orally every 4 hours, As Needed -Severe Pain (7 - 10)  MDD:6 tablets   pantoprazole 40 mg oral delayed release tablet: 1 tab(s) orally once a day (before a meal) before breakfast   senna leaf extract oral tablet: 2 tab(s) orally once a day (at bedtime)  Hold for loose stool.  Vitamin D3 125 mcg (5000 intl units) oral capsule: 1 cap(s) orally once a day

## 2023-01-06 NOTE — PROGRESS NOTE ADULT - PROBLEM SELECTOR PLAN 5
Symptom management recommendations discussed with primary team   Thank you for allowing us to participate in your patient's care. Please page [phone] for any questions/concerns.

## 2023-01-06 NOTE — DISCHARGE NOTE PROVIDER - NSDCFUSCHEDAPPT_GEN_ALL_CORE_FT
[facility] Physician [facility]  [facility]  Scheduled Appointment: 01/13/2023    Leyda Braun  [facility]  GERIATRICS [address]   Scheduled Appointment: 01/18/2023    [facility]  [facility] Inf[facility]  Scheduled Appointment: 01/23/2023    Quin Hamilton  [facility]  GASTRO [address] [facility]  Scheduled Appointment: 03/02/2023     [facility] Physician [facility]  Cong [facility]  Scheduled Appointment: 01/30/2023    Quin Hamilton  [facility] Physician [facility]  GASTRO [address]  Scheduled Appointment: 03/02/2023     [facility]  Cong [facility] Infusio  Scheduled Appointment: 02/13/2023    [facility]  Cong [facility] Infusio  Scheduled Appointment: 02/27/2023    Quin Haimlton  [facility]  GASTRO [address]  Scheduled Appointment: 03/02/2023    [facility]  Cong [facility]  Scheduled Appointment: 03/13/2023     Leyda Braun  [facility]  GERIATRICS [address]   Scheduled Appointment: 02/13/2023    [facility] Physician [facility]  Cong [facility]  Scheduled Appointment: 02/13/2023    [facility]  Cong [facility]  Scheduled Appointment: 02/27/2023    Quin Hamilton  [facility]  GASTRO [address] [facility]  Scheduled Appointment: 03/02/2023    [facility]  [facility]  Scheduled Appointment: 03/13/2023

## 2023-01-06 NOTE — CHART NOTE - NSCHARTNOTEFT_GEN_A_CORE
MRI pelvis w/ and w/o contrast performed on 1/5/2023 reviewed. There is extensive involvement of the left femoral neck and inferior portion of the femoral head which places the patient at risk for pathologic fracture. Would like to get orthopedic surgery's input for consideration of surgical stabilization prior to RT prior to radiation. MRI pelvis w/ and w/o contrast performed on 1/5/2023 reviewed. There is extensive involvement of the left femoral neck and inferior portion of the femoral head which places the patient at risk for pathologic fracture. Would like to get orthopedic surgery's input for consideration of surgical stabilization prior to RT prior to radiation.    Addendum: Discussed the purpose of surgery with the orthopedic surgery team and with the family (daughter and patient) as well. The family would like to discuss surgical intervention again. Surgical team has been notified. Clarified with the family that the risk of having a fracture is very high following radiation as the tumor almost completely replaces bone if there was a good response to treatment. If she were to have a fracture and fall, the morbidity from that would be difficult to recover from. Surgical intervention following radiation would be extremely difficult as radiation interferes with wound healing. Will have surgical team to discuss with patient and family again. MRI pelvis w/ and w/o contrast performed on 1/5/2023 reviewed. There is extensive involvement of the left femoral neck and inferior portion of the femoral head which places the patient at risk for pathologic fracture. Would like to get orthopedic surgery's input for consideration of surgical stabilization prior to RT prior to radiation.    Addendum: Discussed surgical option with surgery team and surgical intervention would be more extensive than just stabilization of the joint and Dr. Villa would prefer not to perform the surgery as an elective option. We will discuss the treatment plan with the patient and daughter. The daughter would prefer to have RT as an inpatient as the patient is not very mobile and does not have help at home. We will acquire consent and plan for simulation next week. MRI pelvis w/ and w/o contrast performed on 1/5/2023 reviewed. There is extensive involvement of the left femoral neck and inferior portion of the femoral head which places the patient at high risk for pathologic fracture.     Addendum: Case discussed with Dr Villa: plan for initial radiation therapy rather than elective surgert keeping surgical intervention in reserve in case progression or fracture. Radiation could be offered as an inpatient or as an outpatient with Dr Lee, but given patient's pain and lack of functional status and inability to weight bear, currently planning for inpatient radiation next week. Daughter will confirm with us tomorrow, when consent will be taken. Simulation and treatment next week.

## 2023-01-06 NOTE — DISCHARGE NOTE PROVIDER - NSDCCPCAREPLAN_GEN_ALL_CORE_FT
PRINCIPAL DISCHARGE DIAGNOSIS  Diagnosis: Primary pancreatic cancer with metastasis to other site  Assessment and Plan of Treatment: follow up with oncology out patient      SECONDARY DISCHARGE DIAGNOSES  Diagnosis: Left thigh pain  Assessment and Plan of Treatment: Continue methadone    Diagnosis: Type 2 diabetes mellitus  Assessment and Plan of Treatment: Continue home meds    Diagnosis: Fracture of femoral neck, left  Assessment and Plan of Treatment: Keep dressing in place and follow up with orthopedic surgery in 2 weeks     PRINCIPAL DISCHARGE DIAGNOSIS  Diagnosis: Primary pancreatic cancer with metastasis to other site  Assessment and Plan of Treatment: follow up with oncology out patient      SECONDARY DISCHARGE DIAGNOSES  Diagnosis: Left thigh pain  Assessment and Plan of Treatment: Continue methadone    Diagnosis: Type 2 diabetes mellitus  Assessment and Plan of Treatment: Continue your regular home oral meds    Diagnosis: Fracture of femoral neck, left  Assessment and Plan of Treatment: Keep dressing in place and follow up with orthopedic surgery in 2 weeks

## 2023-01-06 NOTE — PROGRESS NOTE ADULT - SUBJECTIVE AND OBJECTIVE BOX
[facility] Division of Hospital Medicine  Britta Raymond MD  Pager (BOBY-CAROL, 8A-5P): [phone]  Other Times:  [phone]      SUBJECTIVE / OVERNIGHT EVENTS: Pt still complaining of pain, but is not getting her prn oxycodone. Unclear exactly why, but it may be that she is not asking for it due to the fear of nausea after she takes it. Daughter at bedside, translating. No enema given yesterday because she went for her MRI. Will need one today. SBP in the 90s overnight. No further episodes of vomiting in over 48 hours.    MEDICATIONS  (STANDING):  bisacodyl 5 milliGRAM(s) Oral at bedtime  cholecalciferol 400 Unit(s) Oral daily  dextrose 5%. 1000 milliLiter(s) (50 mL/Hr) IV Continuous <Continuous>  dextrose 5%. 1000 milliLiter(s) (100 mL/Hr) IV Continuous <Continuous>  dextrose 50% Injectable 25 Gram(s) IV Push once  dextrose 50% Injectable 12.5 Gram(s) IV Push once  dextrose 50% Injectable 25 Gram(s) IV Push once  enoxaparin Injectable 40 milliGRAM(s) SubCutaneous every 24 hours  gabapentin 300 milliGRAM(s) Oral two times a day  glucagon  Injectable 1 milliGRAM(s) IntraMuscular once  influenza   Vaccine 0.5 milliLiter(s) IntraMuscular once  insulin lispro (ADMELOG) corrective regimen sliding scale   SubCutaneous three times a day before meals  insulin lispro (ADMELOG) corrective regimen sliding scale   SubCutaneous at bedtime  lactated ringers. 1000 milliLiter(s) (100 mL/Hr) IV Continuous <Continuous>  LORazepam     Tablet 0.5 milliGRAM(s) Oral once  melatonin 3 milliGRAM(s) Oral at bedtime  methadone    Tablet 2.5 milliGRAM(s) Oral two times a day  montelukast 10 milliGRAM(s) Oral at bedtime  multivitamin 1 Tablet(s) Oral daily  ondansetron   Disintegrating Tablet 4 milliGRAM(s) Oral every 6 hours  oxyCODONE    IR 5 milliGRAM(s) Oral every 6 hours  pantoprazole    Tablet 40 milliGRAM(s) Oral before breakfast  polyethylene glycol 3350 17 Gram(s) Oral two times a day  senna 2 Tablet(s) Oral at bedtime    MEDICATIONS  (PRN):  acetaminophen     Tablet .. 650 milliGRAM(s) Oral every 6 hours PRN Temp greater or equal to 38C (100.4F), Mild Pain (1 - 3)  dextrose Oral Gel 15 Gram(s) Oral once PRN Blood Glucose LESS THAN 70 milliGRAM(s)/deciliter  oxyCODONE    IR 10 milliGRAM(s) Oral every 4 hours PRN Moderate Pain (4 - 6)  oxyCODONE    IR 15 milliGRAM(s) Oral every 4 hours PRN Severe Pain (7 - 10)  prochlorperazine   Injectable 5 milliGRAM(s) IV Push every 8 hours PRN refractory nausea      I&O's Summary    05 Jan 2023 07:01  -  06 Jan 2023 07:00  --------------------------------------------------------  IN: 900 mL / OUT: 200 mL / NET: 700 mL        PHYSICAL EXAM:  Vital Signs Last 24 Hrs  T(C): 36.6 (06 Jan 2023 09:33), Max: 36.9 (05 Jan 2023 18:10)  T(F): 97.9 (06 Jan 2023 09:33), Max: 98.5 (05 Jan 2023 22:14)  HR: 60 (06 Jan 2023 09:33) (60 - 69)  BP: 90/52 (06 Jan 2023 09:33) (90/52 - 105/62)  BP(mean): --  RR: 18 (06 Jan 2023 09:33) (17 - 18)  SpO2: 100% (06 Jan 2023 09:33) (98% - 100%)    Parameters below as of 06 Jan 2023 09:33  Patient On (Oxygen Delivery Method): room air      CONSTITUTIONAL: NAD, cachectic   EYES: EOMI; conjunctiva and sclera clear  ENMT: Moist oral mucosa  RESPIRATORY: Normal respiratory effort; lungs are clear to auscultation bilaterally  CARDIOVASCULAR: Regular rate and rhythm, normal S1 and S2, no murmur; No lower extremity edema  ABDOMEN: Nontender to palpation, normoactive bowel sounds, no rebound/guarding; surgical scar extending from umbilicus to sternum, w/d/i  MUSCULOSKELETAL:   no clubbing or cyanosis of digits; no joint swelling or tenderness to palpation; significant decreased ROM of the LLE (cannot lift off of the bed). flexion and extension of the foot intact; full ROM of the RLE  PSYCH: A+O to person, place, and time; affect appropriate  NEUROLOGY: CN 2-12 are intact and symmetric; no gross sensory deficits   SKIN: No rashes; no palpable lesions    LABS:                        9.9    3.34  )-----------( 90       ( 06 Jan 2023 06:18 )             31.1     01-06    138  |  102  |  6<L>  ----------------------------<  111<H>  4.0   |  27  |  0.53    Ca    10.1      06 Jan 2023 06:18  Phos  4.7     01-06  Mg     1.80     01-06                  RADIOLOGY & ADDITIONAL TESTS:  Results Reviewed:   Imaging Personally Reviewed: BOBY GILLESPIE  Electrocardiogram Personally Reviewed:    COORDINATION OF CARE:  Care Discussed with Consultants/Other Providers [Y/N]: Ortho and Rad onc  Prior or Outpatient Records Reviewed [Y/N]:

## 2023-01-06 NOTE — PROGRESS NOTE ADULT - PROBLEM SELECTOR PLAN 3
- possibly related to underlying malignancy; patient possibly with orthostasis like symptoms in setting of poor PO intake   - XRAY Abdomen - Air-filled loops of bowel seen in the abdomen. Nonobstructive bowel gas pattern.  - nausea improving   - IV Zofran 4mg q8 ATC   - IV Compazine or IV Reglan PRN

## 2023-01-06 NOTE — DISCHARGE NOTE PROVIDER - CARE PROVIDERS DIRECT ADDRESSES
[email or web address]

## 2023-01-06 NOTE — DISCHARGE NOTE PROVIDER - DETAILS OF MALNUTRITION DIAGNOSIS/DIAGNOSES
This patient has been assessed with a concern for Malnutrition and was treated during this hospitalization for the following Nutrition diagnosis/diagnoses:     -  01/09/2023: Severe protein-calorie malnutrition

## 2023-01-06 NOTE — CHART NOTE - NSCHARTNOTEFT_GEN_A_CORE
ORTHO CHART NOTE    MRI pelvis performed, pt with lesion at left femoral neck at risk for pathologic fracture .  D/W pt daughter, family prefers to proceed with XRT in hopes of improvement and re-visit surgical option if pt doesn't feel improvement after radiation. As per Dr Villa, Oncologic   Orthopaedic attending pt should be protected weight bearing (50%)on left lower extremity with walker.  Pt may follow up with Dr Villa as outpatient [phone]. Please reach out to ortho should pt or family reconsider surgical option.

## 2023-01-06 NOTE — PROGRESS NOTE ADULT - PROBLEM SELECTOR PLAN 1
- pt p/w 1mo progressive LLE pain and difficulty ambulating x1wk; likely in setting of known L femur metastatic disease; previously on 5mg oxycodone and increased to 10mg; f/w pain management and recently started on methadone 5mg in the last week without relief  - XR L knee/hip/pelvis/femur neg acute fx  - pain control: tylenol (mild), oxy 10mg (moderate), morphine 15mg (severe); morphine 2mg IV for breakthrough--> Palliative care consulted for further pain mgmt rec's. Pt also on 2.5 mg methadone. Changed her back to oxycodone 10 mg q 4 for mod pain and 15 mg IR q 4 for severe pain.  - bowel regimen while on opioids  - MRI 1/5 completed, f/u Ortho and Rad onc

## 2023-01-07 LAB
ANION GAP SERPL CALC-SCNC: 10 MMOL/L — SIGNIFICANT CHANGE UP (ref 7–14)
BUN SERPL-MCNC: 6 MG/DL — LOW (ref 7–23)
CALCIUM SERPL-MCNC: 9.9 MG/DL — SIGNIFICANT CHANGE UP (ref 8.4–10.5)
CHLORIDE SERPL-SCNC: 100 MMOL/L — SIGNIFICANT CHANGE UP (ref 98–107)
CO2 SERPL-SCNC: 28 MMOL/L — SIGNIFICANT CHANGE UP (ref 22–31)
CREAT SERPL-MCNC: 0.55 MG/DL — SIGNIFICANT CHANGE UP (ref 0.5–1.3)
EGFR: 108 ML/MIN/1.73M2 — SIGNIFICANT CHANGE UP
GLUCOSE BLDC GLUCOMTR-MCNC: 119 MG/DL — HIGH (ref 70–99)
GLUCOSE BLDC GLUCOMTR-MCNC: 121 MG/DL — HIGH (ref 70–99)
GLUCOSE BLDC GLUCOMTR-MCNC: 142 MG/DL — HIGH (ref 70–99)
GLUCOSE BLDC GLUCOMTR-MCNC: 186 MG/DL — HIGH (ref 70–99)
GLUCOSE SERPL-MCNC: 120 MG/DL — HIGH (ref 70–99)
HCT VFR BLD CALC: 30.4 % — LOW (ref 34.5–45)
HGB BLD-MCNC: 9.7 G/DL — LOW (ref 11.5–15.5)
MAGNESIUM SERPL-MCNC: 2 MG/DL — SIGNIFICANT CHANGE UP (ref 1.6–2.6)
MCHC RBC-ENTMCNC: 25.1 PG — LOW (ref 27–34)
MCHC RBC-ENTMCNC: 31.9 GM/DL — LOW (ref 32–36)
MCV RBC AUTO: 78.6 FL — LOW (ref 80–100)
NRBC # BLD: 0 /100 WBCS — SIGNIFICANT CHANGE UP (ref 0–0)
NRBC # FLD: 0 K/UL — SIGNIFICANT CHANGE UP (ref 0–0)
PHOSPHATE SERPL-MCNC: 4.5 MG/DL — SIGNIFICANT CHANGE UP (ref 2.5–4.5)
PLATELET # BLD AUTO: 99 K/UL — LOW (ref 150–400)
POTASSIUM SERPL-MCNC: 4.4 MMOL/L — SIGNIFICANT CHANGE UP (ref 3.5–5.3)
POTASSIUM SERPL-SCNC: 4.4 MMOL/L — SIGNIFICANT CHANGE UP (ref 3.5–5.3)
RBC # BLD: 3.87 M/UL — SIGNIFICANT CHANGE UP (ref 3.8–5.2)
RBC # FLD: 15.9 % — HIGH (ref 10.3–14.5)
SODIUM SERPL-SCNC: 138 MMOL/L — SIGNIFICANT CHANGE UP (ref 135–145)
WBC # BLD: 4.1 K/UL — SIGNIFICANT CHANGE UP (ref 3.8–10.5)
WBC # FLD AUTO: 4.1 K/UL — SIGNIFICANT CHANGE UP (ref 3.8–10.5)

## 2023-01-07 PROCEDURE — 99233 SBSQ HOSP IP/OBS HIGH 50: CPT

## 2023-01-07 RX ADMIN — Medication 2: at 13:04

## 2023-01-07 RX ADMIN — ONDANSETRON 4 MILLIGRAM(S): 8 TABLET, FILM COATED ORAL at 17:23

## 2023-01-07 RX ADMIN — GABAPENTIN 300 MILLIGRAM(S): 400 CAPSULE ORAL at 05:54

## 2023-01-07 RX ADMIN — PANTOPRAZOLE SODIUM 40 MILLIGRAM(S): 20 TABLET, DELAYED RELEASE ORAL at 05:54

## 2023-01-07 RX ADMIN — SENNA PLUS 2 TABLET(S): 8.6 TABLET ORAL at 21:24

## 2023-01-07 RX ADMIN — POLYETHYLENE GLYCOL 3350 17 GRAM(S): 17 POWDER, FOR SOLUTION ORAL at 05:54

## 2023-01-07 RX ADMIN — POLYETHYLENE GLYCOL 3350 17 GRAM(S): 17 POWDER, FOR SOLUTION ORAL at 17:24

## 2023-01-07 RX ADMIN — OXYCODONE HYDROCHLORIDE 5 MILLIGRAM(S): 5 TABLET ORAL at 11:37

## 2023-01-07 RX ADMIN — METHADONE HYDROCHLORIDE 2.5 MILLIGRAM(S): 40 TABLET ORAL at 17:23

## 2023-01-07 RX ADMIN — GABAPENTIN 300 MILLIGRAM(S): 400 CAPSULE ORAL at 17:23

## 2023-01-07 RX ADMIN — METHADONE HYDROCHLORIDE 2.5 MILLIGRAM(S): 40 TABLET ORAL at 05:54

## 2023-01-07 RX ADMIN — MONTELUKAST 10 MILLIGRAM(S): 4 TABLET, CHEWABLE ORAL at 21:24

## 2023-01-07 RX ADMIN — OXYCODONE HYDROCHLORIDE 5 MILLIGRAM(S): 5 TABLET ORAL at 17:23

## 2023-01-07 RX ADMIN — Medication 5 MILLIGRAM(S): at 21:24

## 2023-01-07 RX ADMIN — ENOXAPARIN SODIUM 40 MILLIGRAM(S): 100 INJECTION SUBCUTANEOUS at 17:23

## 2023-01-07 RX ADMIN — Medication 1 TABLET(S): at 11:32

## 2023-01-07 RX ADMIN — OXYCODONE HYDROCHLORIDE 5 MILLIGRAM(S): 5 TABLET ORAL at 17:40

## 2023-01-07 RX ADMIN — ONDANSETRON 4 MILLIGRAM(S): 8 TABLET, FILM COATED ORAL at 21:24

## 2023-01-07 NOTE — PROGRESS NOTE ADULT - PROBLEM SELECTOR PLAN 1
- pt p/w 1mo progressive LLE pain and difficulty ambulating x1wk; likely in setting of known L femur metastatic disease; previously on 5mg oxycodone and increased to 10mg; f/w pain management and recently started on methadone 5mg in the last week without relief  - XR L knee/hip/pelvis/femur neg acute fx  - pain control: tylenol (mild), oxy 10mg (moderate), morphine 15mg (severe); morphine 2mg IV for breakthrough--> Palliative care consulted for further pain mgmt rec's. Pt also on 2.5 mg methadone. Changed her back to oxycodone 10 mg q 4 for mod pain and 15 mg IR q 4 for severe pain.  - bowel regimen while on opioids  - MRI 1/5 completed, f/u Ortho and Rad onc - per discussion, agreement to start RT and then ortho reeval

## 2023-01-07 NOTE — PROGRESS NOTE ADULT - PROBLEM SELECTOR PLAN 2
- pt with hx pancreatic CA f/w Dr. Katty Navarro @ [facility]; known mets to L spine and L femur, on chemo (ambraxane and gemzar) last dose 12/27 and started radiation to L spine early December x1 session   - appreciate rad on, nsx and med onc recs   - plan for inpt RT   - holding off CT spine as cord compression is not as much of a concern

## 2023-01-07 NOTE — PROGRESS NOTE ADULT - SUBJECTIVE AND OBJECTIVE BOX
Patient is a 57y old  Female who presents with a chief complaint of Difficulty ambulating (06 Jan 2023 15:07)      SUBJECTIVE / OVERNIGHT EVENTS: no events - daughter at bedside prefers translating - nausea resolved, pain a bit better     ROS:  No HA/DZ  No Vision changes   No CP, SOB  No N/V/D  No Edema  No Rash  NO weakness, numbness    MEDICATIONS  (STANDING):  bisacodyl 5 milliGRAM(s) Oral at bedtime  cholecalciferol 400 Unit(s) Oral daily  dextrose 5%. 1000 milliLiter(s) (50 mL/Hr) IV Continuous <Continuous>  dextrose 5%. 1000 milliLiter(s) (100 mL/Hr) IV Continuous <Continuous>  dextrose 50% Injectable 25 Gram(s) IV Push once  dextrose 50% Injectable 12.5 Gram(s) IV Push once  dextrose 50% Injectable 25 Gram(s) IV Push once  enoxaparin Injectable 40 milliGRAM(s) SubCutaneous every 24 hours  gabapentin 300 milliGRAM(s) Oral two times a day  glucagon  Injectable 1 milliGRAM(s) IntraMuscular once  influenza   Vaccine 0.5 milliLiter(s) IntraMuscular once  insulin lispro (ADMELOG) corrective regimen sliding scale   SubCutaneous three times a day before meals  insulin lispro (ADMELOG) corrective regimen sliding scale   SubCutaneous at bedtime  lactated ringers. 1000 milliLiter(s) (100 mL/Hr) IV Continuous <Continuous>  LORazepam     Tablet 0.5 milliGRAM(s) Oral once  melatonin 3 milliGRAM(s) Oral at bedtime  methadone    Tablet 2.5 milliGRAM(s) Oral two times a day  montelukast 10 milliGRAM(s) Oral at bedtime  multivitamin 1 Tablet(s) Oral daily  ondansetron   Disintegrating Tablet 4 milliGRAM(s) Oral every 6 hours  oxyCODONE    IR 5 milliGRAM(s) Oral every 6 hours  pantoprazole    Tablet 40 milliGRAM(s) Oral before breakfast  polyethylene glycol 3350 17 Gram(s) Oral two times a day  senna 2 Tablet(s) Oral at bedtime    MEDICATIONS  (PRN):  acetaminophen     Tablet .. 650 milliGRAM(s) Oral every 6 hours PRN Temp greater or equal to 38C (100.4F), Mild Pain (1 - 3)  dextrose Oral Gel 15 Gram(s) Oral once PRN Blood Glucose LESS THAN 70 milliGRAM(s)/deciliter  oxyCODONE    IR 15 milliGRAM(s) Oral every 4 hours PRN Severe Pain (7 - 10)  oxyCODONE    IR 10 milliGRAM(s) Oral every 4 hours PRN Moderate Pain (4 - 6)  prochlorperazine   Injectable 5 milliGRAM(s) IV Push every 8 hours PRN refractory nausea      T(C): 36.4 (01-07-23 @ 06:00)  HR: 71 (01-07-23 @ 06:00)  BP: 97/57 (01-07-23 @ 06:00)  RR: 18 (01-07-23 @ 06:00)  SpO2: 100% (01-07-23 @ 06:00)  CAPILLARY BLOOD GLUCOSE      POCT Blood Glucose.: 121 mg/dL (07 Jan 2023 08:22)  POCT Blood Glucose.: 123 mg/dL (06 Jan 2023 22:14)  POCT Blood Glucose.: 179 mg/dL (06 Jan 2023 17:11)  POCT Blood Glucose.: 134 mg/dL (06 Jan 2023 11:51)    I&O's Summary    06 Jan 2023 07:01  -  07 Jan 2023 07:00  --------------------------------------------------------  IN: 1520 mL / OUT: 0 mL / NET: 1520 mL        PHYSICAL EXAM:  GENERAL: NAD  HEAD:  Atraumatic, Normocephalic  EYES: EOMI, PERRL, conjunctiva and sclera clear  NECK: Supple, No JVD  CHEST/LUNG: Clear to auscultation bilaterally  HEART: Regular rate and rhythm; No murmurs, rubs, or gallops, No Edema  ABDOMEN: Soft, Nontender, Nondistended; Bowel sounds present  EXTREMITIES:  2+ Peripheral Pulses, No clubbing, cyanosis  PSYCH: No SI/HI  NEUROLOGY: non-focal  SKIN: No rashes or lesions    LABS:                        9.7    4.10  )-----------( 99       ( 07 Jan 2023 05:57 )             30.4     01-07    138  |  100  |  6<L>  ----------------------------<  120<H>  4.4   |  28  |  0.55    Ca    9.9      07 Jan 2023 05:57  Phos  4.5     01-07  Mg     2.00     01-07                    RADIOLOGY & ADDITIONAL TESTS:    Imaging Personally Reviewed:    Consultant(s) Notes Reviewed:      Care Discussed with Consultants/Other Providers:

## 2023-01-08 LAB
ANION GAP SERPL CALC-SCNC: 9 MMOL/L — SIGNIFICANT CHANGE UP (ref 7–14)
BUN SERPL-MCNC: 5 MG/DL — LOW (ref 7–23)
CALCIUM SERPL-MCNC: 10.1 MG/DL — SIGNIFICANT CHANGE UP (ref 8.4–10.5)
CHLORIDE SERPL-SCNC: 100 MMOL/L — SIGNIFICANT CHANGE UP (ref 98–107)
CO2 SERPL-SCNC: 29 MMOL/L — SIGNIFICANT CHANGE UP (ref 22–31)
CREAT SERPL-MCNC: 0.57 MG/DL — SIGNIFICANT CHANGE UP (ref 0.5–1.3)
EGFR: 107 ML/MIN/1.73M2 — SIGNIFICANT CHANGE UP
GLUCOSE BLDC GLUCOMTR-MCNC: 124 MG/DL — HIGH (ref 70–99)
GLUCOSE BLDC GLUCOMTR-MCNC: 147 MG/DL — HIGH (ref 70–99)
GLUCOSE BLDC GLUCOMTR-MCNC: 184 MG/DL — HIGH (ref 70–99)
GLUCOSE BLDC GLUCOMTR-MCNC: 195 MG/DL — HIGH (ref 70–99)
GLUCOSE SERPL-MCNC: 108 MG/DL — HIGH (ref 70–99)
HCT VFR BLD CALC: 33.1 % — LOW (ref 34.5–45)
HGB BLD-MCNC: 10.5 G/DL — LOW (ref 11.5–15.5)
MAGNESIUM SERPL-MCNC: 2 MG/DL — SIGNIFICANT CHANGE UP (ref 1.6–2.6)
MCHC RBC-ENTMCNC: 24.8 PG — LOW (ref 27–34)
MCHC RBC-ENTMCNC: 31.7 GM/DL — LOW (ref 32–36)
MCV RBC AUTO: 78.3 FL — LOW (ref 80–100)
NRBC # BLD: 0 /100 WBCS — SIGNIFICANT CHANGE UP (ref 0–0)
NRBC # FLD: 0 K/UL — SIGNIFICANT CHANGE UP (ref 0–0)
PHOSPHATE SERPL-MCNC: 4.6 MG/DL — HIGH (ref 2.5–4.5)
PLATELET # BLD AUTO: 117 K/UL — LOW (ref 150–400)
POTASSIUM SERPL-MCNC: 4.8 MMOL/L — SIGNIFICANT CHANGE UP (ref 3.5–5.3)
POTASSIUM SERPL-SCNC: 4.8 MMOL/L — SIGNIFICANT CHANGE UP (ref 3.5–5.3)
RBC # BLD: 4.23 M/UL — SIGNIFICANT CHANGE UP (ref 3.8–5.2)
RBC # FLD: 15.8 % — HIGH (ref 10.3–14.5)
SODIUM SERPL-SCNC: 138 MMOL/L — SIGNIFICANT CHANGE UP (ref 135–145)
WBC # BLD: 4.52 K/UL — SIGNIFICANT CHANGE UP (ref 3.8–10.5)
WBC # FLD AUTO: 4.52 K/UL — SIGNIFICANT CHANGE UP (ref 3.8–10.5)

## 2023-01-08 PROCEDURE — 99233 SBSQ HOSP IP/OBS HIGH 50: CPT

## 2023-01-08 PROCEDURE — 77262 THER RADIOLOGY TX PLNG INTRM: CPT

## 2023-01-08 RX ADMIN — Medication 2: at 12:52

## 2023-01-08 RX ADMIN — Medication 400 UNIT(S): at 11:45

## 2023-01-08 RX ADMIN — POLYETHYLENE GLYCOL 3350 17 GRAM(S): 17 POWDER, FOR SOLUTION ORAL at 05:36

## 2023-01-08 RX ADMIN — OXYCODONE HYDROCHLORIDE 5 MILLIGRAM(S): 5 TABLET ORAL at 21:29

## 2023-01-08 RX ADMIN — Medication 2: at 17:37

## 2023-01-08 RX ADMIN — Medication 1 TABLET(S): at 11:45

## 2023-01-08 RX ADMIN — OXYCODONE HYDROCHLORIDE 5 MILLIGRAM(S): 5 TABLET ORAL at 08:56

## 2023-01-08 RX ADMIN — ONDANSETRON 4 MILLIGRAM(S): 8 TABLET, FILM COATED ORAL at 21:29

## 2023-01-08 RX ADMIN — ENOXAPARIN SODIUM 40 MILLIGRAM(S): 100 INJECTION SUBCUTANEOUS at 17:36

## 2023-01-08 RX ADMIN — ONDANSETRON 4 MILLIGRAM(S): 8 TABLET, FILM COATED ORAL at 08:57

## 2023-01-08 RX ADMIN — ONDANSETRON 4 MILLIGRAM(S): 8 TABLET, FILM COATED ORAL at 17:37

## 2023-01-08 RX ADMIN — METHADONE HYDROCHLORIDE 2.5 MILLIGRAM(S): 40 TABLET ORAL at 17:36

## 2023-01-08 RX ADMIN — GABAPENTIN 300 MILLIGRAM(S): 400 CAPSULE ORAL at 17:36

## 2023-01-08 RX ADMIN — GABAPENTIN 300 MILLIGRAM(S): 400 CAPSULE ORAL at 05:36

## 2023-01-08 RX ADMIN — POLYETHYLENE GLYCOL 3350 17 GRAM(S): 17 POWDER, FOR SOLUTION ORAL at 17:36

## 2023-01-08 RX ADMIN — Medication 5 MILLIGRAM(S): at 21:29

## 2023-01-08 RX ADMIN — METHADONE HYDROCHLORIDE 2.5 MILLIGRAM(S): 40 TABLET ORAL at 05:35

## 2023-01-08 RX ADMIN — OXYCODONE HYDROCHLORIDE 5 MILLIGRAM(S): 5 TABLET ORAL at 21:59

## 2023-01-08 RX ADMIN — OXYCODONE HYDROCHLORIDE 5 MILLIGRAM(S): 5 TABLET ORAL at 13:41

## 2023-01-08 RX ADMIN — OXYCODONE HYDROCHLORIDE 5 MILLIGRAM(S): 5 TABLET ORAL at 14:12

## 2023-01-08 RX ADMIN — OXYCODONE HYDROCHLORIDE 5 MILLIGRAM(S): 5 TABLET ORAL at 09:56

## 2023-01-08 RX ADMIN — MONTELUKAST 10 MILLIGRAM(S): 4 TABLET, CHEWABLE ORAL at 21:29

## 2023-01-08 RX ADMIN — PANTOPRAZOLE SODIUM 40 MILLIGRAM(S): 20 TABLET, DELAYED RELEASE ORAL at 05:38

## 2023-01-08 RX ADMIN — SENNA PLUS 2 TABLET(S): 8.6 TABLET ORAL at 21:29

## 2023-01-08 NOTE — PROGRESS NOTE ADULT - ASSESSMENT
Pt is a 58 yo F with PMH pancreat CA (mets to L spine, L femur; Cong, chemo ambraxane/gemzar 12/27, radiation ?12/15 and s/p Whipple in 2017), T2D, GERD, asthma, and HTN p/w LLE pain x 1mo with difficulty ambulating x1wk. Likely in the setting of known metastatic disease, XR showed lucency on the L femoral neck this admission. Rad onc planning RT after MRI, completed 1/5, now requesting eval by Ortho for possible surgical stabilization prior to RT.  F/u Ortho [ ] and Rad onc [ ].    Course complicated by ongoing severe pain, nausea, vomiting and constipation. Currently on narcotic regimen as below, Palliative care consulted. Pt is not getting her PRNs. For nausea, escalated to scheduled zofran q 6 hrs and IV compazine for refractory nausea PRN. Continue aggressive bowel regimen, requiring daily suppositories, last BM on 1/4. Abd XR [no obstruction, significant gas ]. Vomiting has improved on 1/5. Enema given 1/6.

## 2023-01-08 NOTE — PROGRESS NOTE ADULT - PROBLEM SELECTOR PLAN 2
- pt with hx pancreatic CA f/w Dr. Katty Navarro @ [facility]; known mets to L spine and L femur, on chemo (ambraxane and gemzar) last dose 12/27 and started radiation to L spine early December x1 session   - appreciate rad on, nsx and med onc recs   - plan for inpt RT   - holding off CT spine as cord compression is not as much of a concern

## 2023-01-08 NOTE — PROGRESS NOTE ADULT - PROBLEM SELECTOR PLAN 1
- pt p/w 1mo progressive LLE pain and difficulty ambulating x1wk; likely in setting of known L femur metastatic disease; previously on 5mg oxycodone and increased to 10mg; f/w pain management and recently started on methadone 5mg in the last week without relief  - XR L knee/hip/pelvis/femur neg acute fx  - pain control: tylenol (mild), oxy 10mg (moderate), morphine 15mg (severe); morphine 2mg IV for breakthrough--> Palliative care consulted for further pain mgmt rec's. Pt also on 2.5 mg methadone. Changed her back to oxycodone 10 mg q 4 for mod pain and 15 mg IR q 4 for severe pain.  - bowel regimen while on opioids  - MRI 1/5 completed, f/u Ortho and Rad onc - per discussion, agreement to start RT and then ortho reeval  - has been refusing OXY at times

## 2023-01-08 NOTE — PROGRESS NOTE ADULT - SUBJECTIVE AND OBJECTIVE BOX
Patient is a 57y old  Female who presents with a chief complaint of Difficulty ambulating (07 Jan 2023 09:52)      SUBJECTIVE / OVERNIGHT EVENTS: daughter at bedside translating - no events - has been refusing oxy     ROS:  No HA/DZ  No Vision changes   No CP, SOB  No N/V/D  No Edema  No Rash  NO weakness, numbness    MEDICATIONS  (STANDING):  bisacodyl 5 milliGRAM(s) Oral at bedtime  cholecalciferol 400 Unit(s) Oral daily  dextrose 5%. 1000 milliLiter(s) (50 mL/Hr) IV Continuous <Continuous>  dextrose 5%. 1000 milliLiter(s) (100 mL/Hr) IV Continuous <Continuous>  dextrose 50% Injectable 25 Gram(s) IV Push once  dextrose 50% Injectable 12.5 Gram(s) IV Push once  dextrose 50% Injectable 25 Gram(s) IV Push once  enoxaparin Injectable 40 milliGRAM(s) SubCutaneous every 24 hours  gabapentin 300 milliGRAM(s) Oral two times a day  glucagon  Injectable 1 milliGRAM(s) IntraMuscular once  influenza   Vaccine 0.5 milliLiter(s) IntraMuscular once  insulin lispro (ADMELOG) corrective regimen sliding scale   SubCutaneous three times a day before meals  insulin lispro (ADMELOG) corrective regimen sliding scale   SubCutaneous at bedtime  lactated ringers. 1000 milliLiter(s) (100 mL/Hr) IV Continuous <Continuous>  LORazepam     Tablet 0.5 milliGRAM(s) Oral once  melatonin 3 milliGRAM(s) Oral at bedtime  methadone    Tablet 2.5 milliGRAM(s) Oral two times a day  montelukast 10 milliGRAM(s) Oral at bedtime  multivitamin 1 Tablet(s) Oral daily  ondansetron   Disintegrating Tablet 4 milliGRAM(s) Oral every 6 hours  oxyCODONE    IR 5 milliGRAM(s) Oral every 6 hours  pantoprazole    Tablet 40 milliGRAM(s) Oral before breakfast  polyethylene glycol 3350 17 Gram(s) Oral two times a day  senna 2 Tablet(s) Oral at bedtime    MEDICATIONS  (PRN):  acetaminophen     Tablet .. 650 milliGRAM(s) Oral every 6 hours PRN Temp greater or equal to 38C (100.4F), Mild Pain (1 - 3)  dextrose Oral Gel 15 Gram(s) Oral once PRN Blood Glucose LESS THAN 70 milliGRAM(s)/deciliter  oxyCODONE    IR 10 milliGRAM(s) Oral every 4 hours PRN Moderate Pain (4 - 6)  oxyCODONE    IR 15 milliGRAM(s) Oral every 4 hours PRN Severe Pain (7 - 10)  prochlorperazine   Injectable 5 milliGRAM(s) IV Push every 8 hours PRN refractory nausea      T(C): 36.6 (01-08-23 @ 05:30)  HR: 71 (01-08-23 @ 05:30)  BP: 112/67 (01-08-23 @ 05:30)  RR: 18 (01-08-23 @ 05:30)  SpO2: 100% (01-08-23 @ 05:30)  CAPILLARY BLOOD GLUCOSE      POCT Blood Glucose.: 124 mg/dL (08 Jan 2023 08:11)  POCT Blood Glucose.: 119 mg/dL (07 Jan 2023 23:07)  POCT Blood Glucose.: 142 mg/dL (07 Jan 2023 17:25)  POCT Blood Glucose.: 186 mg/dL (07 Jan 2023 12:10)    I&O's Summary    07 Jan 2023 07:01  -  08 Jan 2023 07:00  --------------------------------------------------------  IN: 720 mL / OUT: 900 mL / NET: -180 mL        PHYSICAL EXAM:  GENERAL: NAD, AOx3  HEAD:  Atraumatic, Normocephalic  EYES: EOMI, PERRL, conjunctiva and sclera clear  NECK: Supple, No JVD  CHEST/LUNG: Clear to auscultation bilaterally  HEART: Regular rate and rhythm; No murmurs, rubs, or gallops, No Edema  ABDOMEN: Soft, Nontender, Nondistended; Bowel sounds present  EXTREMITIES:  2+ Peripheral Pulses, No clubbing, cyanosis  PSYCH: No SI/HI  NEUROLOGY: non-focal  SKIN: No rashes or lesions    LABS:                        10.5   4.52  )-----------( 117      ( 08 Jan 2023 05:50 )             33.1     01-08    138  |  100  |  5<L>  ----------------------------<  108<H>  4.8   |  29  |  0.57    Ca    10.1      08 Jan 2023 05:50  Phos  4.6     01-08  Mg     2.00     01-08                    RADIOLOGY & ADDITIONAL TESTS:    Imaging Personally Reviewed:    Consultant(s) Notes Reviewed:      Care Discussed with Consultants/Other Providers:

## 2023-01-09 ENCOUNTER — APPOINTMENT (OUTPATIENT)
Dept: INFUSION THERAPY | Facility: HOSPITAL | Age: 58
End: 2023-01-09

## 2023-01-09 LAB
ANION GAP SERPL CALC-SCNC: 10 MMOL/L — SIGNIFICANT CHANGE UP (ref 7–14)
BUN SERPL-MCNC: 5 MG/DL — LOW (ref 7–23)
CALCIUM SERPL-MCNC: 9.9 MG/DL — SIGNIFICANT CHANGE UP (ref 8.4–10.5)
CHLORIDE SERPL-SCNC: 100 MMOL/L — SIGNIFICANT CHANGE UP (ref 98–107)
CO2 SERPL-SCNC: 28 MMOL/L — SIGNIFICANT CHANGE UP (ref 22–31)
CREAT SERPL-MCNC: 0.59 MG/DL — SIGNIFICANT CHANGE UP (ref 0.5–1.3)
EGFR: 106 ML/MIN/1.73M2 — SIGNIFICANT CHANGE UP
GLUCOSE BLDC GLUCOMTR-MCNC: 128 MG/DL — HIGH (ref 70–99)
GLUCOSE BLDC GLUCOMTR-MCNC: 139 MG/DL — HIGH (ref 70–99)
GLUCOSE BLDC GLUCOMTR-MCNC: 154 MG/DL — HIGH (ref 70–99)
GLUCOSE BLDC GLUCOMTR-MCNC: 159 MG/DL — HIGH (ref 70–99)
GLUCOSE BLDC GLUCOMTR-MCNC: 169 MG/DL — HIGH (ref 70–99)
GLUCOSE BLDC GLUCOMTR-MCNC: 169 MG/DL — HIGH (ref 70–99)
GLUCOSE SERPL-MCNC: 140 MG/DL — HIGH (ref 70–99)
HCT VFR BLD CALC: 31.6 % — LOW (ref 34.5–45)
HGB BLD-MCNC: 10.1 G/DL — LOW (ref 11.5–15.5)
MAGNESIUM SERPL-MCNC: 1.8 MG/DL — SIGNIFICANT CHANGE UP (ref 1.6–2.6)
MCHC RBC-ENTMCNC: 25.3 PG — LOW (ref 27–34)
MCHC RBC-ENTMCNC: 32 GM/DL — SIGNIFICANT CHANGE UP (ref 32–36)
MCV RBC AUTO: 79 FL — LOW (ref 80–100)
NRBC # BLD: 0 /100 WBCS — SIGNIFICANT CHANGE UP (ref 0–0)
NRBC # FLD: 0 K/UL — SIGNIFICANT CHANGE UP (ref 0–0)
PHOSPHATE SERPL-MCNC: 3.9 MG/DL — SIGNIFICANT CHANGE UP (ref 2.5–4.5)
PLATELET # BLD AUTO: 122 K/UL — LOW (ref 150–400)
POTASSIUM SERPL-MCNC: 4.3 MMOL/L — SIGNIFICANT CHANGE UP (ref 3.5–5.3)
POTASSIUM SERPL-SCNC: 4.3 MMOL/L — SIGNIFICANT CHANGE UP (ref 3.5–5.3)
RBC # BLD: 4 M/UL — SIGNIFICANT CHANGE UP (ref 3.8–5.2)
RBC # FLD: 15.7 % — HIGH (ref 10.3–14.5)
SODIUM SERPL-SCNC: 138 MMOL/L — SIGNIFICANT CHANGE UP (ref 135–145)
WBC # BLD: 4.44 K/UL — SIGNIFICANT CHANGE UP (ref 3.8–10.5)
WBC # FLD AUTO: 4.44 K/UL — SIGNIFICANT CHANGE UP (ref 3.8–10.5)

## 2023-01-09 PROCEDURE — 99233 SBSQ HOSP IP/OBS HIGH 50: CPT

## 2023-01-09 PROCEDURE — 77334 RADIATION TREATMENT AID(S): CPT | Mod: 26

## 2023-01-09 PROCEDURE — 99232 SBSQ HOSP IP/OBS MODERATE 35: CPT

## 2023-01-09 PROCEDURE — 77290 THER RAD SIMULAJ FIELD CPLX: CPT | Mod: 26

## 2023-01-09 RX ORDER — GABAPENTIN 400 MG/1
300 CAPSULE ORAL THREE TIMES A DAY
Refills: 0 | Status: DISCONTINUED | OUTPATIENT
Start: 2023-01-09 | End: 2023-02-03

## 2023-01-09 RX ORDER — OXYCODONE HYDROCHLORIDE 5 MG/1
5 TABLET ORAL EVERY 8 HOURS
Refills: 0 | Status: DISCONTINUED | OUTPATIENT
Start: 2023-01-09 | End: 2023-01-10

## 2023-01-09 RX ORDER — ACETAMINOPHEN 500 MG
650 TABLET ORAL ONCE
Refills: 0 | Status: COMPLETED | OUTPATIENT
Start: 2023-01-09 | End: 2023-01-09

## 2023-01-09 RX ORDER — CHLORHEXIDINE GLUCONATE 213 G/1000ML
1 SOLUTION TOPICAL DAILY
Refills: 0 | Status: DISCONTINUED | OUTPATIENT
Start: 2023-01-09 | End: 2023-02-03

## 2023-01-09 RX ORDER — SODIUM CHLORIDE 9 MG/ML
250 INJECTION, SOLUTION INTRAVENOUS ONCE
Refills: 0 | Status: COMPLETED | OUTPATIENT
Start: 2023-01-09 | End: 2023-01-09

## 2023-01-09 RX ADMIN — ONDANSETRON 4 MILLIGRAM(S): 8 TABLET, FILM COATED ORAL at 21:35

## 2023-01-09 RX ADMIN — POLYETHYLENE GLYCOL 3350 17 GRAM(S): 17 POWDER, FOR SOLUTION ORAL at 06:11

## 2023-01-09 RX ADMIN — GABAPENTIN 300 MILLIGRAM(S): 400 CAPSULE ORAL at 22:21

## 2023-01-09 RX ADMIN — OXYCODONE HYDROCHLORIDE 5 MILLIGRAM(S): 5 TABLET ORAL at 10:15

## 2023-01-09 RX ADMIN — METHADONE HYDROCHLORIDE 2.5 MILLIGRAM(S): 40 TABLET ORAL at 19:29

## 2023-01-09 RX ADMIN — GABAPENTIN 300 MILLIGRAM(S): 400 CAPSULE ORAL at 06:11

## 2023-01-09 RX ADMIN — GABAPENTIN 300 MILLIGRAM(S): 400 CAPSULE ORAL at 15:37

## 2023-01-09 RX ADMIN — OXYCODONE HYDROCHLORIDE 10 MILLIGRAM(S): 5 TABLET ORAL at 15:42

## 2023-01-09 RX ADMIN — SENNA PLUS 2 TABLET(S): 8.6 TABLET ORAL at 21:36

## 2023-01-09 RX ADMIN — METHADONE HYDROCHLORIDE 2.5 MILLIGRAM(S): 40 TABLET ORAL at 06:11

## 2023-01-09 RX ADMIN — Medication 260 MILLIGRAM(S): at 21:35

## 2023-01-09 RX ADMIN — OXYCODONE HYDROCHLORIDE 5 MILLIGRAM(S): 5 TABLET ORAL at 22:21

## 2023-01-09 RX ADMIN — SODIUM CHLORIDE 250 MILLILITER(S): 9 INJECTION, SOLUTION INTRAVENOUS at 21:04

## 2023-01-09 RX ADMIN — Medication 5 MILLIGRAM(S): at 21:36

## 2023-01-09 RX ADMIN — PANTOPRAZOLE SODIUM 40 MILLIGRAM(S): 20 TABLET, DELAYED RELEASE ORAL at 06:11

## 2023-01-09 RX ADMIN — Medication 1 TABLET(S): at 15:38

## 2023-01-09 RX ADMIN — Medication 400 UNIT(S): at 15:36

## 2023-01-09 RX ADMIN — ENOXAPARIN SODIUM 40 MILLIGRAM(S): 100 INJECTION SUBCUTANEOUS at 19:29

## 2023-01-09 RX ADMIN — MONTELUKAST 10 MILLIGRAM(S): 4 TABLET, CHEWABLE ORAL at 21:36

## 2023-01-09 RX ADMIN — OXYCODONE HYDROCHLORIDE 5 MILLIGRAM(S): 5 TABLET ORAL at 09:52

## 2023-01-09 RX ADMIN — OXYCODONE HYDROCHLORIDE 10 MILLIGRAM(S): 5 TABLET ORAL at 16:10

## 2023-01-09 RX ADMIN — CHLORHEXIDINE GLUCONATE 1 APPLICATION(S): 213 SOLUTION TOPICAL at 22:31

## 2023-01-09 NOTE — PROGRESS NOTE ADULT - PROBLEM SELECTOR PLAN 2
- pain uncontrolled  - MRI - Metastatic lesions within the left femoral neck and superior articular facet of L5. The lesion in the left femoral neck likely places the patient at increased risk for pathologic fracture. Small left hip effusion.  - Ortho and Neurosurgery and Rad/onc recommendations appreciated   - PO Methadone 2.5mg BID (QTC on 12/30 - 437)  - Switch to PO Oxy IR 5mg q8 ATC (in addition to methadone); patient may refuse doses   - Increase to PO Gabapentin 300mg TID  - PO Oxy IR 10mg q4 PRN moderate pain (hold for hypotension, oversedation, respiratory depression)  - PO Oxy IR 15mg q4PRN severe pain (hold for hypotension, oversedation, respiratory depression)  - Encouraged patient to ask for prn doses if having pain. Educated/counseled patient and daughter about pain regimen.   - Bowel regimen while on opiates.

## 2023-01-09 NOTE — PROGRESS NOTE ADULT - SUBJECTIVE AND OBJECTIVE BOX
[facility] Division of Hospital Medicine  Festus Washburn DO  Pager (M-F, 1C-9S): [phone]      Patient is a 57y old  Female who presents with a chief complaint of Difficulty ambulating (08 Jan 2023 09:34)      SUBJECTIVE / OVERNIGHT EVENTS: no overnight events, pt seen at bedside, dgt present. Pt reports pain controlled when she takes oxy, however still with nausea when eating, agreed to try IV Compazine prior to meals.   ADDITIONAL REVIEW OF SYSTEMS: no cp/sob     MEDICATIONS  (STANDING):  bisacodyl 5 milliGRAM(s) Oral at bedtime  cholecalciferol 400 Unit(s) Oral daily  dextrose 5%. 1000 milliLiter(s) (100 mL/Hr) IV Continuous <Continuous>  dextrose 5%. 1000 milliLiter(s) (50 mL/Hr) IV Continuous <Continuous>  dextrose 50% Injectable 25 Gram(s) IV Push once  dextrose 50% Injectable 12.5 Gram(s) IV Push once  dextrose 50% Injectable 25 Gram(s) IV Push once  enoxaparin Injectable 40 milliGRAM(s) SubCutaneous every 24 hours  gabapentin 300 milliGRAM(s) Oral three times a day  glucagon  Injectable 1 milliGRAM(s) IntraMuscular once  influenza   Vaccine 0.5 milliLiter(s) IntraMuscular once  insulin lispro (ADMELOG) corrective regimen sliding scale   SubCutaneous three times a day before meals  insulin lispro (ADMELOG) corrective regimen sliding scale   SubCutaneous at bedtime  melatonin 3 milliGRAM(s) Oral at bedtime  methadone    Tablet 2.5 milliGRAM(s) Oral two times a day  montelukast 10 milliGRAM(s) Oral at bedtime  multivitamin 1 Tablet(s) Oral daily  ondansetron   Disintegrating Tablet 4 milliGRAM(s) Oral every 6 hours  oxyCODONE    IR 5 milliGRAM(s) Oral every 8 hours  pantoprazole    Tablet 40 milliGRAM(s) Oral before breakfast  polyethylene glycol 3350 17 Gram(s) Oral two times a day  senna 2 Tablet(s) Oral at bedtime    MEDICATIONS  (PRN):  acetaminophen     Tablet .. 650 milliGRAM(s) Oral every 6 hours PRN Temp greater or equal to 38C (100.4F), Mild Pain (1 - 3)  dextrose Oral Gel 15 Gram(s) Oral once PRN Blood Glucose LESS THAN 70 milliGRAM(s)/deciliter  oxyCODONE    IR 10 milliGRAM(s) Oral every 4 hours PRN Moderate Pain (4 - 6)  oxyCODONE    IR 15 milliGRAM(s) Oral every 4 hours PRN Severe Pain (7 - 10)  prochlorperazine   Injectable 5 milliGRAM(s) IV Push every 8 hours PRN refractory nausea      CAPILLARY BLOOD GLUCOSE      POCT Blood Glucose.: 139 mg/dL (09 Jan 2023 08:17)  POCT Blood Glucose.: 147 mg/dL (08 Jan 2023 21:22)  POCT Blood Glucose.: 184 mg/dL (08 Jan 2023 17:37)  POCT Blood Glucose.: 195 mg/dL (08 Jan 2023 12:16)    I&O's Summary    08 Jan 2023 07:01  -  09 Jan 2023 07:00  --------------------------------------------------------  IN: 1260 mL / OUT: 200 mL / NET: 1060 mL        PHYSICAL EXAM:  Vital Signs Last 24 Hrs  T(C): 36.6 (09 Jan 2023 09:47), Max: 37.5 (08 Jan 2023 21:30)  T(F): 97.9 (09 Jan 2023 09:47), Max: 99.5 (08 Jan 2023 21:30)  HR: 68 (09 Jan 2023 09:47) (68 - 79)  BP: 97/62 (09 Jan 2023 09:47) (91/57 - 106/64)  BP(mean): --  RR: 18 (09 Jan 2023 09:47) (18 - 18)  SpO2: 100% (09 Jan 2023 09:47) (99% - 100%)    Parameters below as of 09 Jan 2023 09:47  Patient On (Oxygen Delivery Method): room air      CONSTITUTIONAL: NAD, cachectic   HEENT: EOMI, MMM  RESPIRATORY: Normal respiratory effort; lungs are clear to auscultation bilaterally  CARDIOVASCULAR: Regular rate and rhythm, normal S1 and S2, no murmur; No lower extremity edema  ABDOMEN: Nontender to palpation, normoactive bowel sounds, no rebound/guarding; surgical scar extending from umbilicus to sternum, w/d/i  MUSCULOSKELETAL:   no clubbing or cyanosis of digits  PSYCH: calm, cooperative   NEUROLOGY: CN 2-12 are intact and symmetric; no gross sensory deficits   SKIN: No rashes; no palpable lesions    LABS:                        10.1   4.44  )-----------( 122      ( 09 Jan 2023 07:05 )             31.6     01-09    138  |  100  |  5<L>  ----------------------------<  140<H>  4.3   |  28  |  0.59    Ca    9.9      09 Jan 2023 07:05  Phos  3.9     01-09  Mg     1.80     01-09                  RADIOLOGY & ADDITIONAL TESTS:  Results Reviewed:   Imaging Personally Reviewed:  Electrocardiogram Personally Reviewed:    COORDINATION OF CARE:  Care Discussed with Consultants/Other Providers [Y/N]: Y  Prior or Outpatient Records Reviewed [Y/N]: LEONA

## 2023-01-09 NOTE — CHART NOTE - NSCHARTNOTEFT_GEN_A_CORE
Images reviewed of left hip.  Seen by orthopedics (Dr. Villa's team)- no planned surgical intervention by patient wishes; prefer to proceed with palliative RT.     Will plan for simulation today and may be able to start first RT fraction on Tuesday/Wednesday of this week.  Planned for 5 fractions.   Left femoral neck at risk of fracture.         < from: MR Pelvis Bony Only w/wo IV Cont (01.05.23 @ 18:56) >    IMPRESSION: Metastatic lesions within the left femoral neck and superior   articular facet of L5. The lesion in the left femoral neck likely places   the patient at increased risk for pathologic fracture.  Small left hip effusion.

## 2023-01-09 NOTE — PROGRESS NOTE ADULT - ASSESSMENT
Pt is a 56 yo F with PMH pancreat CA (mets to L spine, L femur; Cong, chemo abraxane/gemzar 12/27, T2D, GERD, asthma, and HTN p/w LLE pain x 1mo with difficulty ambulating x1wk. Likely in the setting of known metastatic disease. Admitted for PT eval and pain control . Oncology consulted for further recommendations    CTAP 12/23  No gross evidence for recurrent or metastatic disease.  Focal patchy opacities in the superior segment of the left lower lobe not   seen previously and in the left upper lobe, raising concern for   infection. Previously visualized more diffuse groundglass opacity in the   left upper lobe is no longer identified. Please correlate clinically.   Trace left pleural fluid has decreased.    Nuclear Bone Scan 12/23:   IMPRESSION: Abnormal bone scan.  Abnormal foci in the left femoral neck and lumbar spine highly suspicious   for osseous metastases.  Osseous lesion in the left femoral neck places the patient at risk for   pathologic fracture.    MR pelvis 1/5/23  Metastatic lesions within the left femoral neck and superior   articular facet of L5. The lesion in the left femoral neck likely places   the patient at increased risk for pathologic fracture.  Small left hip effusion.      Appreciate Ortho consult ,  spoke to patient and family today , family under the impression  that they could either have RT or Sx,   have not declined surgery   would rec Ortho to speak with patient again re plans,   Appreciate Radiation Oncology Consult rec tentatively planned for simulation and RT  later this week  Appreciate  Palliative Care consult for pain management  Monitor CBC daily, pt counts may go down in the setting of recent chemo  No plans for inpatient chemotherapy  -Rest of care as per primary team  -Patient to followup with Dr. Lewis ([facility]) upon discharge  -C/w Supportive care, pain control, Nutrition, PT, DVT ppx  -Oncology will continue to follow with you      Case discussed with Dr. Cronin and primary team    Fabio CASTLE  Oncology Physician Assistant  [facility] DICKENS/KAREN [facility]  Pager [phone] also available on Teams    If before 8am/after 5pm or on weekends please page On-call Oncology Fellow

## 2023-01-09 NOTE — PROGRESS NOTE ADULT - SUBJECTIVE AND OBJECTIVE BOX
INTERVAL HPI/OVERNIGHT EVENTS:  Patient seen at bedside.  Patient accompanied by her dtr   Continues to complain of leg pain      VITAL SIGNS:  T(F): 97.9 (01-09-23 @ 09:47)  HR: 68 (01-09-23 @ 09:47)  BP: 97/62 (01-09-23 @ 09:47)  RR: 18 (01-09-23 @ 09:47)  SpO2: 100% (01-09-23 @ 09:47)  Wt(kg): --    PHYSICAL EXAM:  CONSTITUTIONAL: NAD, cachectic   HEENT: EOMI, MMM  RESPIRATORY: Normal respiratory effort; lungs are clear to auscultation bilaterally  CARDIOVASCULAR: Regular rate and rhythm, normal S1 and S2, no murmur; No lower extremity edema  ABDOMEN: Nontender to palpation, normoactive bowel sounds, no rebound/guarding; surgical scar extending from umbilicus to sternum, w/d/i  MUSCULOSKELETAL:   no clubbing or cyanosis of digits  PSYCH: calm, cooperative   NEUROLOGY: CN 2-12 are intact and symmetric; no gross sensory deficits   SKIN: No rashes; no palpable lesions      MEDICATIONS  (STANDING):  bisacodyl 5 milliGRAM(s) Oral at bedtime  cholecalciferol 400 Unit(s) Oral daily  dextrose 5%. 1000 milliLiter(s) (100 mL/Hr) IV Continuous <Continuous>  dextrose 5%. 1000 milliLiter(s) (50 mL/Hr) IV Continuous <Continuous>  dextrose 50% Injectable 25 Gram(s) IV Push once  dextrose 50% Injectable 12.5 Gram(s) IV Push once  dextrose 50% Injectable 25 Gram(s) IV Push once  enoxaparin Injectable 40 milliGRAM(s) SubCutaneous every 24 hours  gabapentin 300 milliGRAM(s) Oral three times a day  glucagon  Injectable 1 milliGRAM(s) IntraMuscular once  influenza   Vaccine 0.5 milliLiter(s) IntraMuscular once  insulin lispro (ADMELOG) corrective regimen sliding scale   SubCutaneous three times a day before meals  insulin lispro (ADMELOG) corrective regimen sliding scale   SubCutaneous at bedtime  melatonin 3 milliGRAM(s) Oral at bedtime  methadone    Tablet 2.5 milliGRAM(s) Oral two times a day  montelukast 10 milliGRAM(s) Oral at bedtime  multivitamin 1 Tablet(s) Oral daily  ondansetron   Disintegrating Tablet 4 milliGRAM(s) Oral every 6 hours  oxyCODONE    IR 5 milliGRAM(s) Oral every 8 hours  pantoprazole    Tablet 40 milliGRAM(s) Oral before breakfast  polyethylene glycol 3350 17 Gram(s) Oral two times a day  senna 2 Tablet(s) Oral at bedtime    MEDICATIONS  (PRN):  acetaminophen     Tablet .. 650 milliGRAM(s) Oral every 6 hours PRN Temp greater or equal to 38C (100.4F), Mild Pain (1 - 3)  dextrose Oral Gel 15 Gram(s) Oral once PRN Blood Glucose LESS THAN 70 milliGRAM(s)/deciliter  oxyCODONE    IR 10 milliGRAM(s) Oral every 4 hours PRN Moderate Pain (4 - 6)  oxyCODONE    IR 15 milliGRAM(s) Oral every 4 hours PRN Severe Pain (7 - 10)  prochlorperazine   Injectable 5 milliGRAM(s) IV Push every 8 hours PRN refractory nausea      Allergies    No Known Allergies    Intolerances        LABS:                        10.1   4.44  )-----------( 122      ( 09 Jan 2023 07:05 )             31.6     01-09    138  |  100  |  5<L>  ----------------------------<  140<H>  4.3   |  28  |  0.59    Ca    9.9      09 Jan 2023 07:05  Phos  3.9     01-09  Mg     1.80     01-09            RADIOLOGY & ADDITIONAL TESTS:  Studies reviewed.

## 2023-01-09 NOTE — DIETITIAN INITIAL EVALUATION ADULT - ORAL INTAKE PTA/DIET HISTORY
Met with patient and daughter at bedside. Both prefer to speak native language Turkish, RD able to communicate in preferred language. Patient w/ prolonged poor po intake over >1 year. Observes Halal, No pork and beef diet.

## 2023-01-09 NOTE — PROGRESS NOTE ADULT - SUBJECTIVE AND OBJECTIVE BOX
SUBJECTIVE AND OBJECTIVE:  Patient seen and examined at bedside. Patient being followed up for pain.   Patient declined Latvian ; preferred for daughter at bedside to translate.   Patient states pain has improved with ATC dosing of oxycodone, but still having intermittent pain especially with movement.     INTERVAL HPI/OVERNIGHT EVENTS:  Received 0 prn doses of oxycodone in past 24 hours     Allergies    No Known Allergies    Intolerances    MEDICATIONS  (STANDING):  bisacodyl 5 milliGRAM(s) Oral at bedtime  cholecalciferol 400 Unit(s) Oral daily  dextrose 5%. 1000 milliLiter(s) (100 mL/Hr) IV Continuous <Continuous>  dextrose 5%. 1000 milliLiter(s) (50 mL/Hr) IV Continuous <Continuous>  dextrose 50% Injectable 25 Gram(s) IV Push once  dextrose 50% Injectable 12.5 Gram(s) IV Push once  dextrose 50% Injectable 25 Gram(s) IV Push once  enoxaparin Injectable 40 milliGRAM(s) SubCutaneous every 24 hours  gabapentin 300 milliGRAM(s) Oral three times a day  glucagon  Injectable 1 milliGRAM(s) IntraMuscular once  influenza   Vaccine 0.5 milliLiter(s) IntraMuscular once  insulin lispro (ADMELOG) corrective regimen sliding scale   SubCutaneous at bedtime  insulin lispro (ADMELOG) corrective regimen sliding scale   SubCutaneous three times a day before meals  melatonin 3 milliGRAM(s) Oral at bedtime  methadone    Tablet 2.5 milliGRAM(s) Oral two times a day  montelukast 10 milliGRAM(s) Oral at bedtime  multivitamin 1 Tablet(s) Oral daily  ondansetron   Disintegrating Tablet 4 milliGRAM(s) Oral every 6 hours  oxyCODONE    IR 5 milliGRAM(s) Oral every 8 hours  pantoprazole    Tablet 40 milliGRAM(s) Oral before breakfast  polyethylene glycol 3350 17 Gram(s) Oral two times a day  senna 2 Tablet(s) Oral at bedtime    MEDICATIONS  (PRN):  acetaminophen     Tablet .. 650 milliGRAM(s) Oral every 6 hours PRN Temp greater or equal to 38C (100.4F), Mild Pain (1 - 3)  dextrose Oral Gel 15 Gram(s) Oral once PRN Blood Glucose LESS THAN 70 milliGRAM(s)/deciliter  oxyCODONE    IR 10 milliGRAM(s) Oral every 4 hours PRN Moderate Pain (4 - 6)  oxyCODONE    IR 15 milliGRAM(s) Oral every 4 hours PRN Severe Pain (7 - 10)  prochlorperazine   Injectable 5 milliGRAM(s) IV Push every 8 hours PRN refractory nausea    ITEMS UNCHECKED ARE NOT PRESENT    PRESENT SYMPTOMS: [ ]Unable to self-report due to altered mental status- see [ ] CPOT [ ] PAINADS [ ] RDOS  Source if other than patient:  [ ]Family   [ ]Team     Pain: [ x]yes [ ]no  QOL impact - severe  Location - left thigh                     Aggravating factors - movement   Quality - tingling   Radiation - left lower extremity and lower back  Timing- constant with intermittent worsening episodes   Severity (0-10 scale): 10  Minimal acceptable level / Pain Goal (0-10 scale):  7-8    Dyspnea:                           [ ]Mild [ ]Moderate [ ]Severe  Anxiety:                             [ ]Mild [ ]Moderate [ ]Severe  Agitation:                          [ ]Mild [ ]Moderate [ ]Severe  Fatigue:                             [ ]Mild [ ]Moderate [ ]Severe  Nausea:                             [x ]Mild [ ]Moderate [ ]Severe  Loss of appetite:              [x ]Mild [ ]Moderate [ ]Severe  Constipation:                   [ ]Mild [ ]Moderate [ ]Severe  Diarrhea:                          [ ]Mild [ ]Moderate [ ]Severe    CPOT:    https://www.[facility].org/getattachment/[email or web address]/Critical-Care-Pain-Observation-Tool-(CPOT)    PCSSQ[Palliative Care Spiritual Screening Question]   Severity (0-10):  Score of 4 or > indicate consideration of Chaplaincy referral.  Chaplaincy Referral: [ ] yes [ ] refused [ ] following [x ] deferred    Caregiver [place]? : [ ] yes [ ] no [ ] Declined [x ] Deferred              Social work referral [ ] Patient & Family Centered Care Referral [ ]     Anticipatory Grief present?:  [ ] yes [ ] no  [ x] Deferred                  Social work referral [ ] Chaplaincy Referral[ ]    Other Symptoms:  [ x]All other review of systems negative     PHYSICAL EXAM:  Vital Signs Last 24 Hrs  T(C): 36.6 (06 Jan 2023 09:33), Max: 36.9 (05 Jan 2023 18:10)  T(F): 97.9 (06 Jan 2023 09:33), Max: 98.5 (05 Jan 2023 22:14)  HR: 60 (06 Jan 2023 09:33) (60 - 69)  BP: 90/52 (06 Jan 2023 09:33) (90/52 - 105/62)  BP(mean): --  RR: 18 (06 Jan 2023 09:33) (17 - 18)  SpO2: 100% (06 Jan 2023 09:33) (98% - 100%)    Parameters below as of 06 Jan 2023 09:33  Patient On (Oxygen Delivery Method): room air      GENERAL:  [x ]Alert  [x ]Oriented x 3  [ ]Lethargic  [ ]Cachexia  [ ]Unarousable  [x ]Verbal  [ ]Non-Verbal    Behavioral:   [ ] Anxiety  [ ] Delirium [ ] Agitation [ x] Calm  [ ] Other  HEENT:  [ x]Normal  [ ] Temporal Wasting  [ ]Dry mouth   [ ]ET Tube/Trach  [ ]Oral lesions  [ ] Mucositis  PULMONARY:   [ x]Clear [ ]Tachypnea  [ ]Audible excessive secretions   [ ]Rhonchi        [ ]Right [ ]Left [ ]Bilateral  [ ]Crackles        [ ]Right [ ]Left [ ]Bilateral  [ ]Wheezing     [ ]Right [ ]Left [ ]Bilateral  [ ]Diminished breath sounds [ ]right [ ]left [ ]bilateral  CARDIOVASCULAR:    [ x]Regular [ ]Irregular [ ]Tachy  [ ]Dmitry [ ]Murmur [ ]Other  GASTROINTESTINAL:  [ x]Soft  [ ]Distended   [ ]+BS  [x ]Non tender [ ]Tender  [ ]PEG [ ]OGT/ NGT  Last BM: 1/8  GENITOURINARY:   [x ]Normal [ ] Incontinent   [ ]Oliguria/Anuria   [ ]Swanson  MUSCULOSKELETAL:   [x ]Normal , except left lower extremity movement limited due to pain  [ ]Weakness  [ ]Bed/Wheelchair bound [ ]Edema  [  ] amputation  [  ] contraction  NEUROLOGIC:   [ x]No focal deficits  [ ]Cognitive impairment  [ ]Dysphagia [ ]Dysarthria [ ]Paresis [ ]Other   SKIN: See Nursing Skin Assessment for further details  [x ]Normal    [ ]Rash  [ ]Pressure ulcer(s)       Present on admission [ ]y [ ]n   [  ]  Wound    [  ] hyperpigmentation      CRITICAL CARE:  [ ]Shock Present  [ ]Septic [ ]Cardiogenic [ ]Neurologic [ ]Hypovolemic  [ ]Vasopressors [ ]Inotropes  [ ]Respiratory failure present [ ]Mechanical Ventilation [ ]Non-invasive ventilatory support [ ]High-Flow   [ ]Acute  [ ]Chronic [ ]Hypoxic  [ ]Hypercarbic [ ]Other  [ ]Other organ failure     LABS:                                            10.1   4.44  )-----------( 122      ( 09 Jan 2023 07:05 )             31.6       01-09    138  |  100  |  5<L>  ----------------------------<  140<H>  4.3   |  28  |  0.59    Ca    9.9      09 Jan 2023 07:05  Phos  3.9     01-09  Mg     1.80     01-09    RADIOLOGY & ADDITIONAL STUDIES: reviewed     Protein Calorie Malnutrition Present: [ ]mild [ ]moderate [ ]severe [ ]underweight [ ]morbid obesity  https://www.andeal.org/vault/2440/web/files/ONC/Table_Clinical%20Characteristics%20to%20Document%20Malnutrition-White%20JV%20et%20al%202012.pdf    Height (cm): 149 (12-29-22 @ 22:41), 149 (12-27-22 @ 16:21), 147.3 (10-22-22 @ 08:57)  Weight (kg): 43.5 (12-27-22 @ 16:21), 47 (10-22-22 @ 08:57), 45.9 (07-20-22 @ 11:51)  BMI (kg/m2): 19.6 (12-29-22 @ 22:41), 19.6 (12-27-22 @ 16:21), 21.7 (10-22-22 @ 08:57)    [ ]PPSV2 < or = 30%  [ ]significant weight loss [ ]poor nutritional intake [ ]anasarca   [ ]Artificial Nutrition    REFERRALS:   [ ]Chaplaincy  [ ]Hospice  [ ]Child Life  [ ]Social Work  [ x]Case management [ ]Holistic Therapy

## 2023-01-09 NOTE — DIETITIAN INITIAL EVALUATION ADULT - SIGNS/SYMPTOMS
metastatic pancreatic cancer s/p chemo/RT <75% nutritional needs >1month, physical findings stated above

## 2023-01-09 NOTE — DIETITIAN INITIAL EVALUATION ADULT - PERTINENT LABORATORY DATA
01-09    138  |  100  |  5<L>  ----------------------------<  140<H>  4.3   |  28  |  0.59    Ca    9.9      09 Jan 2023 07:05  Phos  3.9     01-09  Mg     1.80     01-09    POCT Blood Glucose.: 169 mg/dL (01-09-23 @ 12:20)  A1C with Estimated Average Glucose Result: 5.2 % (12-31-22 @ 05:28)  A1C with Estimated Average Glucose Result: 7.4 % (10-22-22 @ 13:40)  A1C with Estimated Average Glucose Result: 5.7 % (07-20-22 @ 06:20)

## 2023-01-09 NOTE — PROGRESS NOTE ADULT - PROBLEM SELECTOR PLAN 5
Symptom management recommendations discussed with primary team   Thank you for allowing us to participate in your patient's care. Please page [phone] for any questions/concerns.

## 2023-01-09 NOTE — DIETITIAN INITIAL EVALUATION ADULT - REASON FOR ADMISSION
Malignant neoplasm of pancreas    Per chart review, 56 yo F with PMH pancreat CA (mets to L spine, L femur; Cong, chemo ambraxane/gemzar 12/27, radiation ?12/15 and s/p Whipple in 2017), T2D, GERD, asthma, and HTN p/w LLE pain x 1mo with difficulty ambulating x1wk. Likely in the setting of known metastatic disease, XR showed lucency on the L femoral neck this admission.  Course complicated by ongoing severe pain, nausea, vomiting and constipation.

## 2023-01-09 NOTE — PROGRESS NOTE ADULT - PROBLEM SELECTOR PLAN 2
- pt with hx pancreatic CA f/w Dr. Katty Navarro @ [facility]; known mets to L spine and L femur, on chemo (ambraxane and gemzar) last dose 12/27 and started radiation to L spine early December x1 session   - appreciate rad on, nsx and med onc recs   - plan for inpt RT   - holding off CT spine as cord compression is not as much of a concern

## 2023-01-09 NOTE — CHART NOTE - NSCHARTNOTEFT_GEN_A_CORE
Case discussed with ortho fellow as patient and family are under the impression that they could either have RT or Sx and have not declined surgery. Ortho called back to talk to patient and family for clarification of plans. As per ortho, they will talk to patient and family again.    Eden Rosa, MELQUIADES-BC  Department of Medicine  In house pager [phone]

## 2023-01-09 NOTE — DIETITIAN INITIAL EVALUATION ADULT - ADD RECOMMEND
1. Monitor weights, labs, BM's, skin integrity, p.o. intake.   2. Please document % PO intake in nursing flowsheet.   3. Honor food and beverage preferences within diet restriction of patient in an effort to maximize level of nutrient intake.

## 2023-01-09 NOTE — DIETITIAN INITIAL EVALUATION ADULT - NS FNS DIET ORDER
Diet, Regular:   Halal  No Beef  No Pork  Supplement Feeding Modality:  Oral  Ensure Max Cans or Servings Per Day:  1       Frequency:  Three Times a day (01-09-23 @ 14:09)

## 2023-01-09 NOTE — DIETITIAN INITIAL EVALUATION ADULT - OTHER INFO
Patient continues with poor po intake <50% of most meals as confirmed by daughter at bedside. Daughter also brings food from home but continues with minimal acceptance, due to dysgeusia. Patient denies any nausea/vomiting/diarrhea/constipation or difficulty chewing and swallowing. Importance of having good po intake of nutrient and protein dense foods emphasized. Strategies to increase kcal and protein intake inclusive of cultural foods suggested. Patient and family receptive to information provided.   Weight history per daughter: usual weight reported 110lbs and declining over the year associates with cancer and inability to consume adequate energy intake.   Weight history [facility] and previous RD notes:   1/21/22- 47.6kg/104.9lbs  7/20/22- 45.9kg/101.2lbs  10/26/22-46.7kg/103lbs  11/10/22-43kg  12/27/22-43.7kg  No weight recorded this admission. RN informed to obtain new weight w/ zeroed bed.

## 2023-01-09 NOTE — DIETITIAN INITIAL EVALUATION ADULT - PERTINENT MEDS FT
MEDICATIONS  (STANDING):  bisacodyl 5 milliGRAM(s) Oral at bedtime  cholecalciferol 400 Unit(s) Oral daily  dextrose 5%. 1000 milliLiter(s) (100 mL/Hr) IV Continuous <Continuous>  dextrose 5%. 1000 milliLiter(s) (50 mL/Hr) IV Continuous <Continuous>  dextrose 50% Injectable 25 Gram(s) IV Push once  dextrose 50% Injectable 12.5 Gram(s) IV Push once  dextrose 50% Injectable 25 Gram(s) IV Push once  enoxaparin Injectable 40 milliGRAM(s) SubCutaneous every 24 hours  gabapentin 300 milliGRAM(s) Oral three times a day  glucagon  Injectable 1 milliGRAM(s) IntraMuscular once  influenza   Vaccine 0.5 milliLiter(s) IntraMuscular once  insulin lispro (ADMELOG) corrective regimen sliding scale   SubCutaneous three times a day before meals  insulin lispro (ADMELOG) corrective regimen sliding scale   SubCutaneous at bedtime  melatonin 3 milliGRAM(s) Oral at bedtime  methadone    Tablet 2.5 milliGRAM(s) Oral two times a day  montelukast 10 milliGRAM(s) Oral at bedtime  multivitamin 1 Tablet(s) Oral daily  ondansetron   Disintegrating Tablet 4 milliGRAM(s) Oral every 6 hours  oxyCODONE    IR 5 milliGRAM(s) Oral every 8 hours  pantoprazole    Tablet 40 milliGRAM(s) Oral before breakfast  polyethylene glycol 3350 17 Gram(s) Oral two times a day  senna 2 Tablet(s) Oral at bedtime    MEDICATIONS  (PRN):  acetaminophen     Tablet .. 650 milliGRAM(s) Oral every 6 hours PRN Temp greater or equal to 38C (100.4F), Mild Pain (1 - 3)  dextrose Oral Gel 15 Gram(s) Oral once PRN Blood Glucose LESS THAN 70 milliGRAM(s)/deciliter  oxyCODONE    IR 10 milliGRAM(s) Oral every 4 hours PRN Moderate Pain (4 - 6)  oxyCODONE    IR 15 milliGRAM(s) Oral every 4 hours PRN Severe Pain (7 - 10)  prochlorperazine   Injectable 5 milliGRAM(s) IV Push every 8 hours PRN refractory nausea

## 2023-01-09 NOTE — DIETITIAN INITIAL EVALUATION ADULT - ORAL NUTRITION SUPPLEMENTS
Patient was previously ordered for Glucerna Shake however, not in house at this time. Discussed with ACP and switched to Ensure Max x 3 per day-appreciate change of order.

## 2023-01-09 NOTE — PROGRESS NOTE ADULT - ASSESSMENT
Pt is a 58 yo F with PMH pancreat CA (mets to L spine, L femur; Cong, chemo ambraxane/gemzar 12/27, radiation ?12/15 and s/p Whipple in 2017), T2D, GERD, asthma, and HTN p/w LLE pain x 1mo with difficulty ambulating x1wk. Likely in the setting of known metastatic disease, XR showed lucency on the L femoral neck this admission.  Course complicated by ongoing severe pain, nausea, vomiting and constipation. Currently on narcotic regimen per Palliative care consulted.

## 2023-01-09 NOTE — DIETITIAN NUTRITION RISK NOTIFICATION - TREATMENT: THE FOLLOWING DIET HAS BEEN RECOMMENDED
Diet, Regular:   Halal  No Beef  No Pork  Supplement Feeding Modality:  Oral  Ensure Max Cans or Servings Per Day:  1       Frequency:  Three Times a day (01-09-23 @ 14:09) [Active]

## 2023-01-10 LAB
ANION GAP SERPL CALC-SCNC: 11 MMOL/L — SIGNIFICANT CHANGE UP (ref 7–14)
BUN SERPL-MCNC: 6 MG/DL — LOW (ref 7–23)
CALCIUM SERPL-MCNC: 9.9 MG/DL — SIGNIFICANT CHANGE UP (ref 8.4–10.5)
CHLORIDE SERPL-SCNC: 99 MMOL/L — SIGNIFICANT CHANGE UP (ref 98–107)
CO2 SERPL-SCNC: 26 MMOL/L — SIGNIFICANT CHANGE UP (ref 22–31)
CREAT SERPL-MCNC: 0.52 MG/DL — SIGNIFICANT CHANGE UP (ref 0.5–1.3)
EGFR: 109 ML/MIN/1.73M2 — SIGNIFICANT CHANGE UP
GLUCOSE BLDC GLUCOMTR-MCNC: 126 MG/DL — HIGH (ref 70–99)
GLUCOSE BLDC GLUCOMTR-MCNC: 145 MG/DL — HIGH (ref 70–99)
GLUCOSE BLDC GLUCOMTR-MCNC: 155 MG/DL — HIGH (ref 70–99)
GLUCOSE BLDC GLUCOMTR-MCNC: 164 MG/DL — HIGH (ref 70–99)
GLUCOSE BLDC GLUCOMTR-MCNC: 200 MG/DL — HIGH (ref 70–99)
GLUCOSE SERPL-MCNC: 144 MG/DL — HIGH (ref 70–99)
HCT VFR BLD CALC: 31 % — LOW (ref 34.5–45)
HGB BLD-MCNC: 10 G/DL — LOW (ref 11.5–15.5)
MAGNESIUM SERPL-MCNC: 1.7 MG/DL — SIGNIFICANT CHANGE UP (ref 1.6–2.6)
MCHC RBC-ENTMCNC: 25.3 PG — LOW (ref 27–34)
MCHC RBC-ENTMCNC: 32.3 GM/DL — SIGNIFICANT CHANGE UP (ref 32–36)
MCV RBC AUTO: 78.5 FL — LOW (ref 80–100)
MRSA PCR RESULT.: SIGNIFICANT CHANGE UP
NRBC # BLD: 0 /100 WBCS — SIGNIFICANT CHANGE UP (ref 0–0)
NRBC # FLD: 0 K/UL — SIGNIFICANT CHANGE UP (ref 0–0)
PHOSPHATE SERPL-MCNC: 4.4 MG/DL — SIGNIFICANT CHANGE UP (ref 2.5–4.5)
PLATELET # BLD AUTO: 141 K/UL — LOW (ref 150–400)
POTASSIUM SERPL-MCNC: 4 MMOL/L — SIGNIFICANT CHANGE UP (ref 3.5–5.3)
POTASSIUM SERPL-SCNC: 4 MMOL/L — SIGNIFICANT CHANGE UP (ref 3.5–5.3)
RBC # BLD: 3.95 M/UL — SIGNIFICANT CHANGE UP (ref 3.8–5.2)
RBC # FLD: 15.6 % — HIGH (ref 10.3–14.5)
S AUREUS DNA NOSE QL NAA+PROBE: SIGNIFICANT CHANGE UP
SODIUM SERPL-SCNC: 136 MMOL/L — SIGNIFICANT CHANGE UP (ref 135–145)
WBC # BLD: 4.75 K/UL — SIGNIFICANT CHANGE UP (ref 3.8–10.5)
WBC # FLD AUTO: 4.75 K/UL — SIGNIFICANT CHANGE UP (ref 3.8–10.5)

## 2023-01-10 PROCEDURE — 77307 TELETHX ISODOSE PLAN CPLX: CPT | Mod: 26

## 2023-01-10 PROCEDURE — 99233 SBSQ HOSP IP/OBS HIGH 50: CPT

## 2023-01-10 PROCEDURE — 77334 RADIATION TREATMENT AID(S): CPT | Mod: 26

## 2023-01-10 RX ORDER — OXYCODONE HYDROCHLORIDE 5 MG/1
5 TABLET ORAL EVERY 6 HOURS
Refills: 0 | Status: DISCONTINUED | OUTPATIENT
Start: 2023-01-10 | End: 2023-01-11

## 2023-01-10 RX ORDER — ACETAMINOPHEN 500 MG
625 TABLET ORAL ONCE
Refills: 0 | Status: COMPLETED | OUTPATIENT
Start: 2023-01-10 | End: 2023-01-10

## 2023-01-10 RX ADMIN — GABAPENTIN 300 MILLIGRAM(S): 400 CAPSULE ORAL at 15:09

## 2023-01-10 RX ADMIN — GABAPENTIN 300 MILLIGRAM(S): 400 CAPSULE ORAL at 21:11

## 2023-01-10 RX ADMIN — OXYCODONE HYDROCHLORIDE 5 MILLIGRAM(S): 5 TABLET ORAL at 13:00

## 2023-01-10 RX ADMIN — Medication 625 MILLIGRAM(S): at 23:58

## 2023-01-10 RX ADMIN — METHADONE HYDROCHLORIDE 2.5 MILLIGRAM(S): 40 TABLET ORAL at 06:58

## 2023-01-10 RX ADMIN — Medication 400 UNIT(S): at 12:24

## 2023-01-10 RX ADMIN — OXYCODONE HYDROCHLORIDE 5 MILLIGRAM(S): 5 TABLET ORAL at 18:22

## 2023-01-10 RX ADMIN — ONDANSETRON 4 MILLIGRAM(S): 8 TABLET, FILM COATED ORAL at 12:25

## 2023-01-10 RX ADMIN — ENOXAPARIN SODIUM 40 MILLIGRAM(S): 100 INJECTION SUBCUTANEOUS at 18:02

## 2023-01-10 RX ADMIN — ONDANSETRON 4 MILLIGRAM(S): 8 TABLET, FILM COATED ORAL at 21:11

## 2023-01-10 RX ADMIN — Medication 1 TABLET(S): at 12:24

## 2023-01-10 RX ADMIN — OXYCODONE HYDROCHLORIDE 5 MILLIGRAM(S): 5 TABLET ORAL at 12:24

## 2023-01-10 RX ADMIN — OXYCODONE HYDROCHLORIDE 10 MILLIGRAM(S): 5 TABLET ORAL at 21:11

## 2023-01-10 RX ADMIN — PANTOPRAZOLE SODIUM 40 MILLIGRAM(S): 20 TABLET, DELAYED RELEASE ORAL at 05:58

## 2023-01-10 RX ADMIN — POLYETHYLENE GLYCOL 3350 17 GRAM(S): 17 POWDER, FOR SOLUTION ORAL at 05:58

## 2023-01-10 RX ADMIN — OXYCODONE HYDROCHLORIDE 5 MILLIGRAM(S): 5 TABLET ORAL at 06:55

## 2023-01-10 RX ADMIN — ONDANSETRON 4 MILLIGRAM(S): 8 TABLET, FILM COATED ORAL at 05:45

## 2023-01-10 RX ADMIN — METHADONE HYDROCHLORIDE 2.5 MILLIGRAM(S): 40 TABLET ORAL at 18:02

## 2023-01-10 RX ADMIN — ONDANSETRON 4 MILLIGRAM(S): 8 TABLET, FILM COATED ORAL at 15:09

## 2023-01-10 RX ADMIN — Medication 5 MILLIGRAM(S): at 22:50

## 2023-01-10 RX ADMIN — POLYETHYLENE GLYCOL 3350 17 GRAM(S): 17 POWDER, FOR SOLUTION ORAL at 18:02

## 2023-01-10 RX ADMIN — Medication 250 MILLIGRAM(S): at 22:58

## 2023-01-10 RX ADMIN — OXYCODONE HYDROCHLORIDE 5 MILLIGRAM(S): 5 TABLET ORAL at 05:57

## 2023-01-10 RX ADMIN — GABAPENTIN 300 MILLIGRAM(S): 400 CAPSULE ORAL at 05:58

## 2023-01-10 RX ADMIN — OXYCODONE HYDROCHLORIDE 10 MILLIGRAM(S): 5 TABLET ORAL at 22:11

## 2023-01-10 RX ADMIN — SENNA PLUS 2 TABLET(S): 8.6 TABLET ORAL at 21:11

## 2023-01-10 RX ADMIN — OXYCODONE HYDROCHLORIDE 5 MILLIGRAM(S): 5 TABLET ORAL at 18:02

## 2023-01-10 RX ADMIN — MONTELUKAST 10 MILLIGRAM(S): 4 TABLET, CHEWABLE ORAL at 21:11

## 2023-01-10 RX ADMIN — Medication 2: at 12:44

## 2023-01-10 RX ADMIN — Medication 5 MILLIGRAM(S): at 21:12

## 2023-01-10 NOTE — PROGRESS NOTE ADULT - PROBLEM SELECTOR PLAN 1
p/w 1mo progressive LLE pain and difficulty ambulating x1wk; likely in setting of known L femur metastatic disease; previously on 5mg oxycodone and increased to 10mg; f/w pain management and recently started on methadone 5mg in the last week without relief  - XR L knee/hip/pelvis/femur neg acute fx  - pain control per palliative: 2.5 mg methadone, oxycodone 5mg q8 standing (pt can refuse), 10 mg q 4 for mod pain and 15 mg IR q 4 for severe pain.  - bowel regimen while on opioids- last BM 1/8   - MRI 1/5 completed, no surgical intervention. Plan for RT, sim 1/9 followed by 5 sessions

## 2023-01-10 NOTE — PROGRESS NOTE ADULT - PROBLEM SELECTOR PLAN 2
- pain uncontrolled  - MRI - Metastatic lesions within the left femoral neck and superior articular facet of L5. The lesion in the left femoral neck likely places the patient at increased risk for pathologic fracture. Small left hip effusion.  - Ortho and Neurosurgery and Rad/onc recommendations appreciated   - PO Methadone 2.5mg BID (QTC on 12/30 - 437)  - Increase to PO Oxy IR 5mg q6 ATC (in addition to methadone); patient may refuse doses   - PO Gabapentin 300mg TID  - PO Oxy IR 10mg q4 PRN moderate pain (hold for hypotension, oversedation, respiratory depression)  - PO Oxy IR 15mg q4PRN severe pain (hold for hypotension, oversedation, respiratory depression)  - Encouraged patient to ask for prn doses if having pain. Educated/counseled patient and daughter about pain regimen.   - Bowel regimen while on opiates.

## 2023-01-10 NOTE — PROGRESS NOTE ADULT - SUBJECTIVE AND OBJECTIVE BOX
SUBJECTIVE AND OBJECTIVE:  Patient seen and examined at bedside. Patient being followed up for pain.   Patient declined Setswana ; preferred for daughter at bedside to translate.   Patient states she feels like she has not had any improvement with pain.   Patient and daughter asking to speak with Ortho team as they wish to clarify medical plan concerning recommendations for surgical interventions in conjunction with RT.    INTERVAL HPI/OVERNIGHT EVENTS:  Received 1 prn doses of oxycodone 10mg in past 24 hours     Allergies    No Known Allergies    Intolerances    MEDICATIONS  (STANDING):  bisacodyl 5 milliGRAM(s) Oral at bedtime  chlorhexidine 2% Cloths 1 Application(s) Topical daily  cholecalciferol 400 Unit(s) Oral daily  dextrose 5%. 1000 milliLiter(s) (50 mL/Hr) IV Continuous <Continuous>  dextrose 5%. 1000 milliLiter(s) (100 mL/Hr) IV Continuous <Continuous>  dextrose 50% Injectable 25 Gram(s) IV Push once  dextrose 50% Injectable 12.5 Gram(s) IV Push once  dextrose 50% Injectable 25 Gram(s) IV Push once  enoxaparin Injectable 40 milliGRAM(s) SubCutaneous every 24 hours  gabapentin 300 milliGRAM(s) Oral three times a day  glucagon  Injectable 1 milliGRAM(s) IntraMuscular once  influenza   Vaccine 0.5 milliLiter(s) IntraMuscular once  insulin lispro (ADMELOG) corrective regimen sliding scale   SubCutaneous three times a day before meals  insulin lispro (ADMELOG) corrective regimen sliding scale   SubCutaneous at bedtime  melatonin 3 milliGRAM(s) Oral at bedtime  methadone    Tablet 2.5 milliGRAM(s) Oral two times a day  montelukast 10 milliGRAM(s) Oral at bedtime  multivitamin 1 Tablet(s) Oral daily  ondansetron   Disintegrating Tablet 4 milliGRAM(s) Oral every 6 hours  oxyCODONE    IR 5 milliGRAM(s) Oral every 6 hours  pantoprazole    Tablet 40 milliGRAM(s) Oral before breakfast  polyethylene glycol 3350 17 Gram(s) Oral two times a day  senna 2 Tablet(s) Oral at bedtime    MEDICATIONS  (PRN):  acetaminophen     Tablet .. 650 milliGRAM(s) Oral every 6 hours PRN Temp greater or equal to 38C (100.4F), Mild Pain (1 - 3)  dextrose Oral Gel 15 Gram(s) Oral once PRN Blood Glucose LESS THAN 70 milliGRAM(s)/deciliter  oxyCODONE    IR 10 milliGRAM(s) Oral every 4 hours PRN Moderate Pain (4 - 6)  oxyCODONE    IR 15 milliGRAM(s) Oral every 4 hours PRN Severe Pain (7 - 10)  prochlorperazine   Injectable 5 milliGRAM(s) IV Push every 8 hours PRN refractory nausea    ITEMS UNCHECKED ARE NOT PRESENT    PRESENT SYMPTOMS: [ ]Unable to self-report due to altered mental status- see [ ] CPOT [ ] PAINADS [ ] RDOS  Source if other than patient:  [ ]Family   [ ]Team     Pain: [ x]yes [ ]no  QOL impact - severe  Location - left thigh                     Aggravating factors - movement   Quality - tingling   Radiation - left lower extremity and lower back  Timing- constant with intermittent worsening episodes   Severity (0-10 scale): 10  Minimal acceptable level / Pain Goal (0-10 scale):  7-8    Dyspnea:                           [ ]Mild [ ]Moderate [ ]Severe  Anxiety:                             [ ]Mild [ ]Moderate [ ]Severe  Agitation:                          [ ]Mild [ ]Moderate [ ]Severe  Fatigue:                             [ ]Mild [ ]Moderate [ ]Severe  Nausea:                             [x ]Mild [ ]Moderate [ ]Severe  Loss of appetite:              [x ]Mild [ ]Moderate [ ]Severe  Constipation:                   [ ]Mild [ ]Moderate [ ]Severe  Diarrhea:                          [ ]Mild [ ]Moderate [ ]Severe    CPOT:    https://www.[facility].org/getattachment/[email or web address]/Critical-Care-Pain-Observation-Tool-(CPOT)    PCSSQ[Palliative Care Spiritual Screening Question]   Severity (0-10):  Score of 4 or > indicate consideration of Chaplaincy referral.  Chaplaincy Referral: [ ] yes [ ] refused [ ] following [x ] deferred    Caregiver [place]? : [ ] yes [ ] no [ ] Declined [x ] Deferred              Social work referral [ ] Patient & Family Centered Care Referral [ ]     Anticipatory Grief present?:  [ ] yes [ ] no  [ x] Deferred                  Social work referral [ ] Chaplaincy Referral[ ]    Other Symptoms:  [ x]All other review of systems negative     PHYSICAL EXAM:  Vital Signs Last 24 Hrs  T(C): 36.8 (10 Jameel 2023 21:42), Max: 36.8 (10 Jameel 2023 21:42)  T(F): 98.2 (10 Jameel 2023 21:42), Max: 98.2 (10 Jameel 2023 21:42)  HR: 84 (10 Jameel 2023 21:42) (70 - 84)  BP: 111/62 (10 Jameel 2023 21:42) (103/59 - 113/60)  BP(mean): --  RR: 18 (10 Jameel 2023 21:42) (18 - 18)  SpO2: 98% (10 Jameel 2023 21:42) (98% - 100%)    Parameters below as of 10 Jameel 2023 21:42  Patient On (Oxygen Delivery Method): room air      GENERAL:  [x ]Alert  [x ]Oriented x 3  [ ]Lethargic  [ ]Cachexia  [ ]Unarousable  [x ]Verbal  [ ]Non-Verbal    Behavioral:   [ ] Anxiety  [ ] Delirium [ ] Agitation [ x] Calm  [ ] Other  HEENT:  [ x]Normal  [ ] Temporal Wasting  [ ]Dry mouth   [ ]ET Tube/Trach  [ ]Oral lesions  [ ] Mucositis  PULMONARY:   [ x]Clear [ ]Tachypnea  [ ]Audible excessive secretions   [ ]Rhonchi        [ ]Right [ ]Left [ ]Bilateral  [ ]Crackles        [ ]Right [ ]Left [ ]Bilateral  [ ]Wheezing     [ ]Right [ ]Left [ ]Bilateral  [ ]Diminished breath sounds [ ]right [ ]left [ ]bilateral  CARDIOVASCULAR:    [ x]Regular [ ]Irregular [ ]Tachy  [ ]Dmitry [ ]Murmur [ ]Other  GASTROINTESTINAL:  [ x]Soft  [ ]Distended   [ ]+BS  [x ]Non tender [ ]Tender  [ ]PEG [ ]OGT/ NGT  Last BM: 1/8  GENITOURINARY:   [x ]Normal [ ] Incontinent   [ ]Oliguria/Anuria   [ ]Swanson  MUSCULOSKELETAL:   [x ]Normal , except left lower extremity movement limited due to pain  [ ]Weakness  [ ]Bed/Wheelchair bound [ ]Edema  [  ] amputation  [  ] contraction  NEUROLOGIC:   [ x]No focal deficits  [ ]Cognitive impairment  [ ]Dysphagia [ ]Dysarthria [ ]Paresis [ ]Other   SKIN: See Nursing Skin Assessment for further details  [x ]Normal    [ ]Rash  [ ]Pressure ulcer(s)       Present on admission [ ]y [ ]n   [  ]  Wound    [  ] hyperpigmentation      CRITICAL CARE:  [ ]Shock Present  [ ]Septic [ ]Cardiogenic [ ]Neurologic [ ]Hypovolemic  [ ]Vasopressors [ ]Inotropes  [ ]Respiratory failure present [ ]Mechanical Ventilation [ ]Non-invasive ventilatory support [ ]High-Flow   [ ]Acute  [ ]Chronic [ ]Hypoxic  [ ]Hypercarbic [ ]Other  [ ]Other organ failure     LABS:                                           10.0   4.75  )-----------( 141      ( 10 Jameel 2023 06:00 )             31.0       01-10    136  |  99  |  6<L>  ----------------------------<  144<H>  4.0   |  26  |  0.52    Ca    9.9      10 Jameel 2023 06:00  Phos  4.4     01-10  Mg     1.70     01-10          RADIOLOGY & ADDITIONAL STUDIES: reviewed     Protein Calorie Malnutrition Present: [ ]mild [ ]moderate [ ]severe [ ]underweight [ ]morbid obesity  https://www.andeal.org/vault/2440/web/files/ONC/Table_Clinical%20Characteristics%20to%20Document%20Malnutrition-White%20JV%20et%20al%067892.pdf    Height (cm): 149 (12-29-22 @ 22:41), 149 (12-27-22 @ 16:21), 147.3 (10-22-22 @ 08:57)  Weight (kg): 43.5 (12-27-22 @ 16:21), 47 (10-22-22 @ 08:57), 45.9 (07-20-22 @ 11:51)  BMI (kg/m2): 19.6 (12-29-22 @ 22:41), 19.6 (12-27-22 @ 16:21), 21.7 (10-22-22 @ 08:57)    [ ]PPSV2 < or = 30%  [ ]significant weight loss [ ]poor nutritional intake [ ]anasarca   [ ]Artificial Nutrition    REFERRALS:   [ ]Chaplaincy  [ ]Hospice  [ ]Child Life  [ ]Social Work  [ x]Case management [ ]Holistic Therapy

## 2023-01-10 NOTE — PROGRESS NOTE ADULT - SUBJECTIVE AND OBJECTIVE BOX
[facility] Department of Hospital Medicine  Lorie Soto DO  Available on MS Teams  Pager: [phone]    Patient is a 57y old  Female who presents with a chief complaint of Difficulty ambulating (09 Jan 2023 15:07)    Subjective:  Pt seen and examined at bedside with daughter, no acute distress. Hoping to speak with ortho attending to clarify plan moving forward. Requesting bedside commode for ease of transferring for toileting in setting of pain. No other concerns or complaints.    VITAL SIGNS:  T(C): 36.6 (01-10-23 @ 13:06), Max: 37.4 (01-09-23 @ 17:30)  T(F): 97.8 (01-10-23 @ 13:06), Max: 99.3 (01-09-23 @ 17:30)  HR: 77 (01-10-23 @ 13:06) (70 - 78)  BP: 103/59 (01-10-23 @ 13:06) (97/57 - 113/60)  BP(mean): --  RR: 18 (01-10-23 @ 05:55) (17 - 18)  SpO2: 100% (01-10-23 @ 13:06) (99% - 100%)  Wt(kg): --    PHYSICAL EXAM:  Constitutional: cachectic elderly F; resting comfortably in bed  Head: NC/AT  Eyes: PERRL, EOMI, anicteric sclera  ENT: no nasal discharge; MMM  Neck: supple; no JVD  Respiratory: CTA B/L; no W/R/R; on RA without respiratory distress  Cardiac: +S1/S2; RRR; no M/R/G  Gastrointestinal: soft, scaphoid abd; NT/ND; no rebound or guarding; +BSx4  Extremities: WWP, no clubbing or cyanosis; no peripheral edema; peripheral mm wasting  Musculoskeletal: moves all extremities spontaneously  Vascular: 2+ radial, DP/PT pulses B/L  Dermatologic: skin warm, dry and intact; no rashes, wounds, or scars  Neurologic: CNII-XII grossly intact; no focal deficits  Psychiatric: affect and characteristics of appearance, verbalizations, behaviors are appropriate    MEDICATIONS  (STANDING):  bisacodyl 5 milliGRAM(s) Oral at bedtime  chlorhexidine 2% Cloths 1 Application(s) Topical daily  cholecalciferol 400 Unit(s) Oral daily  dextrose 5%. 1000 milliLiter(s) (50 mL/Hr) IV Continuous <Continuous>  dextrose 5%. 1000 milliLiter(s) (100 mL/Hr) IV Continuous <Continuous>  dextrose 50% Injectable 25 Gram(s) IV Push once  dextrose 50% Injectable 12.5 Gram(s) IV Push once  dextrose 50% Injectable 25 Gram(s) IV Push once  enoxaparin Injectable 40 milliGRAM(s) SubCutaneous every 24 hours  gabapentin 300 milliGRAM(s) Oral three times a day  glucagon  Injectable 1 milliGRAM(s) IntraMuscular once  influenza   Vaccine 0.5 milliLiter(s) IntraMuscular once  insulin lispro (ADMELOG) corrective regimen sliding scale   SubCutaneous three times a day before meals  insulin lispro (ADMELOG) corrective regimen sliding scale   SubCutaneous at bedtime  melatonin 3 milliGRAM(s) Oral at bedtime  methadone    Tablet 2.5 milliGRAM(s) Oral two times a day  montelukast 10 milliGRAM(s) Oral at bedtime  multivitamin 1 Tablet(s) Oral daily  ondansetron   Disintegrating Tablet 4 milliGRAM(s) Oral every 6 hours  oxyCODONE    IR 5 milliGRAM(s) Oral every 6 hours  pantoprazole    Tablet 40 milliGRAM(s) Oral before breakfast  polyethylene glycol 3350 17 Gram(s) Oral two times a day  senna 2 Tablet(s) Oral at bedtime    MEDICATIONS  (PRN):  acetaminophen     Tablet .. 650 milliGRAM(s) Oral every 6 hours PRN Temp greater or equal to 38C (100.4F), Mild Pain (1 - 3)  dextrose Oral Gel 15 Gram(s) Oral once PRN Blood Glucose LESS THAN 70 milliGRAM(s)/deciliter  oxyCODONE    IR 10 milliGRAM(s) Oral every 4 hours PRN Moderate Pain (4 - 6)  oxyCODONE    IR 15 milliGRAM(s) Oral every 4 hours PRN Severe Pain (7 - 10)  prochlorperazine   Injectable 5 milliGRAM(s) IV Push every 8 hours PRN refractory nausea    LABS:                        10.0   4.75  )-----------( 141      ( 10 Jameel 2023 06:00 )             31.0     01-10    136  |  99  |  6<L>  ----------------------------<  144<H>  4.0   |  26  |  0.52    Ca    9.9      10 Jameel 2023 06:00  Phos  4.4     01-10  Mg     1.70     01-10          CAPILLARY BLOOD GLUCOSE      POCT Blood Glucose.: 200 mg/dL (10 Jameel 2023 12:22)      RADIOLOGY & ADDITIONAL TESTS: Reviewed.

## 2023-01-10 NOTE — PROGRESS NOTE ADULT - PROBLEM SELECTOR PLAN 4
Symptom management recommendations discussed with primary team   Thank you for allowing us to participate in your patient's care. Please page [phone] for any questions/concerns.

## 2023-01-10 NOTE — PROGRESS NOTE ADULT - PROBLEM SELECTOR PLAN 2
- pt with hx pancreatic CA f/w Dr. Katty Navarro @ [facility]; known mets to L spine and L femur, on chemo (ambraxane and gemzar) last dose 12/27 and started radiation to L spine early December x1 session   - appreciate rad on, nsx and med onc recs   - plan for inpt RT

## 2023-01-10 NOTE — PROGRESS NOTE ADULT - ASSESSMENT
Pt is a 56 yo F with PMH pancreat CA (mets to L spine, L femur; Cong, chemo ambraxane/gemzar 12/27, radiation ?12/15 and s/p Whipple in 2017), T2D, GERD, asthma, and HTN p/w LLE pain x 1mo with difficulty ambulating x1wk. Likely in the setting of known metastatic disease, XR showed lucency on the L femoral neck this admission.  Course complicated by ongoing severe pain, nausea, vomiting and constipation. Currently on narcotic regimen per Palliative care.

## 2023-01-10 NOTE — CHART NOTE - NSCHARTNOTEFT_GEN_A_CORE
Per discussion with Palliative Care Dr. Grover, will increase standing oxycodone 5 mg to q 6hrs. Per discussion with Palliative Care Dr. Michel, will increase standing oxycodone 5 mg to q 6hrs.

## 2023-01-10 NOTE — CHART NOTE - NSCHARTNOTEFT_GEN_A_CORE
Pt and family with concerns/questions about plan of care; they were unclear that she may be a candidate for both surgery followed by RT (rather she was under the impression she had to choose one intervention over the other).   Spoke with Ortho TIN Prieto and requested ortho team re-discuss options with pt/family.

## 2023-01-11 LAB
GLUCOSE BLDC GLUCOMTR-MCNC: 125 MG/DL — HIGH (ref 70–99)
GLUCOSE BLDC GLUCOMTR-MCNC: 131 MG/DL — HIGH (ref 70–99)
GLUCOSE BLDC GLUCOMTR-MCNC: 132 MG/DL — HIGH (ref 70–99)
GLUCOSE BLDC GLUCOMTR-MCNC: 222 MG/DL — HIGH (ref 70–99)

## 2023-01-11 PROCEDURE — 99233 SBSQ HOSP IP/OBS HIGH 50: CPT

## 2023-01-11 PROCEDURE — 99232 SBSQ HOSP IP/OBS MODERATE 35: CPT

## 2023-01-11 RX ORDER — OXYCODONE HYDROCHLORIDE 5 MG/1
7.5 TABLET ORAL EVERY 6 HOURS
Refills: 0 | Status: DISCONTINUED | OUTPATIENT
Start: 2023-01-11 | End: 2023-01-12

## 2023-01-11 RX ORDER — OXYCODONE HYDROCHLORIDE 5 MG/1
7.5 TABLET ORAL ONCE
Refills: 0 | Status: DISCONTINUED | OUTPATIENT
Start: 2023-01-11 | End: 2023-01-11

## 2023-01-11 RX ADMIN — ONDANSETRON 4 MILLIGRAM(S): 8 TABLET, FILM COATED ORAL at 20:58

## 2023-01-11 RX ADMIN — ONDANSETRON 4 MILLIGRAM(S): 8 TABLET, FILM COATED ORAL at 14:32

## 2023-01-11 RX ADMIN — OXYCODONE HYDROCHLORIDE 7.5 MILLIGRAM(S): 5 TABLET ORAL at 13:05

## 2023-01-11 RX ADMIN — OXYCODONE HYDROCHLORIDE 5 MILLIGRAM(S): 5 TABLET ORAL at 05:33

## 2023-01-11 RX ADMIN — POLYETHYLENE GLYCOL 3350 17 GRAM(S): 17 POWDER, FOR SOLUTION ORAL at 05:33

## 2023-01-11 RX ADMIN — METHADONE HYDROCHLORIDE 2.5 MILLIGRAM(S): 40 TABLET ORAL at 06:49

## 2023-01-11 RX ADMIN — ONDANSETRON 4 MILLIGRAM(S): 8 TABLET, FILM COATED ORAL at 08:58

## 2023-01-11 RX ADMIN — POLYETHYLENE GLYCOL 3350 17 GRAM(S): 17 POWDER, FOR SOLUTION ORAL at 18:16

## 2023-01-11 RX ADMIN — OXYCODONE HYDROCHLORIDE 7.5 MILLIGRAM(S): 5 TABLET ORAL at 14:29

## 2023-01-11 RX ADMIN — Medication 5 MILLIGRAM(S): at 12:21

## 2023-01-11 RX ADMIN — Medication 5 MILLIGRAM(S): at 21:01

## 2023-01-11 RX ADMIN — CHLORHEXIDINE GLUCONATE 1 APPLICATION(S): 213 SOLUTION TOPICAL at 12:24

## 2023-01-11 RX ADMIN — OXYCODONE HYDROCHLORIDE 7.5 MILLIGRAM(S): 5 TABLET ORAL at 19:14

## 2023-01-11 RX ADMIN — Medication 400 UNIT(S): at 12:33

## 2023-01-11 RX ADMIN — GABAPENTIN 300 MILLIGRAM(S): 400 CAPSULE ORAL at 05:33

## 2023-01-11 RX ADMIN — OXYCODONE HYDROCHLORIDE 7.5 MILLIGRAM(S): 5 TABLET ORAL at 18:15

## 2023-01-11 RX ADMIN — Medication 4: at 12:47

## 2023-01-11 RX ADMIN — METHADONE HYDROCHLORIDE 2.5 MILLIGRAM(S): 40 TABLET ORAL at 18:15

## 2023-01-11 RX ADMIN — Medication 1 TABLET(S): at 12:33

## 2023-01-11 RX ADMIN — MONTELUKAST 10 MILLIGRAM(S): 4 TABLET, CHEWABLE ORAL at 21:01

## 2023-01-11 RX ADMIN — ENOXAPARIN SODIUM 40 MILLIGRAM(S): 100 INJECTION SUBCUTANEOUS at 18:16

## 2023-01-11 RX ADMIN — GABAPENTIN 300 MILLIGRAM(S): 400 CAPSULE ORAL at 13:34

## 2023-01-11 RX ADMIN — GABAPENTIN 300 MILLIGRAM(S): 400 CAPSULE ORAL at 21:01

## 2023-01-11 RX ADMIN — OXYCODONE HYDROCHLORIDE 5 MILLIGRAM(S): 5 TABLET ORAL at 06:33

## 2023-01-11 RX ADMIN — ONDANSETRON 4 MILLIGRAM(S): 8 TABLET, FILM COATED ORAL at 04:33

## 2023-01-11 RX ADMIN — PANTOPRAZOLE SODIUM 40 MILLIGRAM(S): 20 TABLET, DELAYED RELEASE ORAL at 06:49

## 2023-01-11 RX ADMIN — SENNA PLUS 2 TABLET(S): 8.6 TABLET ORAL at 21:00

## 2023-01-11 NOTE — PROGRESS NOTE ADULT - SUBJECTIVE AND OBJECTIVE BOX
[facility] Department of Hospital Medicine  Lorie Soto DO  Available on MS Teams  Pager: [phone]    Patient is a 57y old  Female who presents with a chief complaint of Difficulty ambulating (09 Jan 2023 15:07)    Subjective:  Pt seen and examined at bedside sleeping comfortably, asked to not be woken up. Daughter not bedside. Attempted to call number listed in chart, but no answer. Still pending ortho re-eval.    Vital Signs Last 24 Hrs  T(C): 36.9 (11 Jan 2023 13:10), Max: 36.9 (11 Jan 2023 13:10)  T(F): 98.4 (11 Jan 2023 13:10), Max: 98.4 (11 Jan 2023 13:10)  HR: 73 (11 Jan 2023 13:10) (73 - 84)  BP: 100/53 (11 Jan 2023 13:10) (100/53 - 111/62)  BP(mean): --  RR: 17 (11 Jan 2023 06:03) (17 - 18)  SpO2: 96% (11 Jan 2023 13:10) (96% - 100%)    Parameters below as of 11 Jan 2023 13:10  Patient On (Oxygen Delivery Method): room air    PHYSICAL EXAM:  Constitutional: cachectic elderly F; sleeping comfortably in bed  Head: NC/AT  Eyes: PERRL, EOMI, anicteric sclera  ENT: no nasal discharge; MMM  Neck: supple; no JVD  Respiratory: CTA B/L; no W/R/R; on RA without respiratory distress  Cardiac: +S1/S2; RRR; no M/R/G  Gastrointestinal: soft, scaphoid abd; NT/ND; no rebound or guarding; +BSx4  Extremities: WWP, no clubbing or cyanosis; no peripheral edema; peripheral mm wasting  Musculoskeletal: moves all extremities spontaneously  Vascular: 2+ radial, DP/PT pulses B/L  Dermatologic: skin warm, dry and intact; no rashes, wounds, or scars  Neurologic: CNII-XII grossly intact; no focal deficits  Psychiatric: affect and characteristics of appearance, verbalizations, behaviors are appropriate    MEDICATIONS  (STANDING):  bisacodyl 5 milliGRAM(s) Oral at bedtime  chlorhexidine 2% Cloths 1 Application(s) Topical daily  cholecalciferol 400 Unit(s) Oral daily  dextrose 5%. 1000 milliLiter(s) (50 mL/Hr) IV Continuous <Continuous>  dextrose 5%. 1000 milliLiter(s) (100 mL/Hr) IV Continuous <Continuous>  dextrose 50% Injectable 25 Gram(s) IV Push once  dextrose 50% Injectable 12.5 Gram(s) IV Push once  dextrose 50% Injectable 25 Gram(s) IV Push once  enoxaparin Injectable 40 milliGRAM(s) SubCutaneous every 24 hours  gabapentin 300 milliGRAM(s) Oral three times a day  glucagon  Injectable 1 milliGRAM(s) IntraMuscular once  influenza   Vaccine 0.5 milliLiter(s) IntraMuscular once  insulin lispro (ADMELOG) corrective regimen sliding scale   SubCutaneous three times a day before meals  insulin lispro (ADMELOG) corrective regimen sliding scale   SubCutaneous at bedtime  melatonin 3 milliGRAM(s) Oral at bedtime  methadone    Tablet 2.5 milliGRAM(s) Oral two times a day  montelukast 10 milliGRAM(s) Oral at bedtime  multivitamin 1 Tablet(s) Oral daily  ondansetron   Disintegrating Tablet 4 milliGRAM(s) Oral every 6 hours  oxyCODONE    IR 7.5 milliGRAM(s) Oral every 6 hours  pantoprazole    Tablet 40 milliGRAM(s) Oral before breakfast  polyethylene glycol 3350 17 Gram(s) Oral two times a day  senna 2 Tablet(s) Oral at bedtime    MEDICATIONS  (PRN):  acetaminophen     Tablet .. 650 milliGRAM(s) Oral every 6 hours PRN Temp greater or equal to 38C (100.4F), Mild Pain (1 - 3)  dextrose Oral Gel 15 Gram(s) Oral once PRN Blood Glucose LESS THAN 70 milliGRAM(s)/deciliter  oxyCODONE    IR 10 milliGRAM(s) Oral every 4 hours PRN Moderate Pain (4 - 6)  oxyCODONE    IR 15 milliGRAM(s) Oral every 4 hours PRN Severe Pain (7 - 10)  prochlorperazine   Injectable 5 milliGRAM(s) IV Push every 8 hours PRN refractory nausea    Labs: lab holiday

## 2023-01-11 NOTE — PROGRESS NOTE ADULT - PROBLEM SELECTOR PLAN 4
Symptom management recommendations discussed with primary team   Thank you for allowing us to participate in your patient's care. Please page [phone] for any questions/concerns.

## 2023-01-11 NOTE — PROGRESS NOTE ADULT - PROBLEM SELECTOR PLAN 2
- pain uncontrolled  - MRI - Metastatic lesions within the left femoral neck and superior articular facet of L5. The lesion in the left femoral neck likely places the patient at increased risk for pathologic fracture. Small left hip effusion.  - Ortho and Neurosurgery and Rad/onc recommendations appreciated   - PO Methadone 2.5mg BID (QTC on 12/30 - 437)  - Increase to PO Oxy IR 7.5mg q6 ATC (in addition to methadone); patient may refuse doses   - PO Gabapentin 300mg TID  - PO Oxy IR 10mg q4 PRN moderate pain (hold for hypotension, oversedation, respiratory depression)  - PO Oxy IR 15mg q4PRN severe pain (hold for hypotension, oversedation, respiratory depression)  - Encouraged patient to ask for prn doses if having pain. Educated/counseled patient and daughter about pain regimen.   - Bowel regimen while on opiates.

## 2023-01-11 NOTE — PROGRESS NOTE ADULT - SUBJECTIVE AND OBJECTIVE BOX
INTERVAL HPI/OVERNIGHT EVENTS:  Patient seen at bedside and case discussed with her daughter over the phone  Continues to complain of leg pain    Vital Signs Last 24 Hrs  T(C): 36.9 (11 Jan 2023 13:10), Max: 36.9 (11 Jan 2023 13:10)  T(F): 98.4 (11 Jan 2023 13:10), Max: 98.4 (11 Jan 2023 13:10)  HR: 73 (11 Jan 2023 13:10) (73 - 84)  BP: 100/53 (11 Jan 2023 13:10) (100/53 - 111/62)  BP(mean): --  RR: 17 (11 Jan 2023 06:03) (17 - 18)  SpO2: 96% (11 Jan 2023 13:10) (96% - 100%)    Parameters below as of 11 Jan 2023 13:10  Patient On (Oxygen Delivery Method): room air        PHYSICAL EXAM:  CONSTITUTIONAL: NAD, cachectic   HEENT: EOMI, MMM  RESPIRATORY: Normal respiratory effort; lungs are clear to auscultation bilaterally  CARDIOVASCULAR: Regular rate and rhythm, normal S1 and S2, no murmur; No lower extremity edema  ABDOMEN: Nontender to palpation, normoactive bowel sounds, no rebound/guarding; surgical scar extending from umbilicus to sternum, w/d/i  MUSCULOSKELETAL:   no clubbing or cyanosis of digits  PSYCH: calm, cooperative   NEUROLOGY: CN 2-12 are intact and symmetric; no gross sensory deficits   SKIN: No rashes; no palpable lesions      MEDICATIONS  (STANDING):  bisacodyl 5 milliGRAM(s) Oral at bedtime  cholecalciferol 400 Unit(s) Oral daily  dextrose 5%. 1000 milliLiter(s) (100 mL/Hr) IV Continuous <Continuous>  dextrose 5%. 1000 milliLiter(s) (50 mL/Hr) IV Continuous <Continuous>  dextrose 50% Injectable 25 Gram(s) IV Push once  dextrose 50% Injectable 12.5 Gram(s) IV Push once  dextrose 50% Injectable 25 Gram(s) IV Push once  enoxaparin Injectable 40 milliGRAM(s) SubCutaneous every 24 hours  gabapentin 300 milliGRAM(s) Oral three times a day  glucagon  Injectable 1 milliGRAM(s) IntraMuscular once  influenza   Vaccine 0.5 milliLiter(s) IntraMuscular once  insulin lispro (ADMELOG) corrective regimen sliding scale   SubCutaneous three times a day before meals  insulin lispro (ADMELOG) corrective regimen sliding scale   SubCutaneous at bedtime  melatonin 3 milliGRAM(s) Oral at bedtime  methadone    Tablet 2.5 milliGRAM(s) Oral two times a day  montelukast 10 milliGRAM(s) Oral at bedtime  multivitamin 1 Tablet(s) Oral daily  ondansetron   Disintegrating Tablet 4 milliGRAM(s) Oral every 6 hours  oxyCODONE    IR 5 milliGRAM(s) Oral every 8 hours  pantoprazole    Tablet 40 milliGRAM(s) Oral before breakfast  polyethylene glycol 3350 17 Gram(s) Oral two times a day  senna 2 Tablet(s) Oral at bedtime    MEDICATIONS  (PRN):  acetaminophen     Tablet .. 650 milliGRAM(s) Oral every 6 hours PRN Temp greater or equal to 38C (100.4F), Mild Pain (1 - 3)  dextrose Oral Gel 15 Gram(s) Oral once PRN Blood Glucose LESS THAN 70 milliGRAM(s)/deciliter  oxyCODONE    IR 10 milliGRAM(s) Oral every 4 hours PRN Moderate Pain (4 - 6)  oxyCODONE    IR 15 milliGRAM(s) Oral every 4 hours PRN Severe Pain (7 - 10)  prochlorperazine   Injectable 5 milliGRAM(s) IV Push every 8 hours PRN refractory nausea      Allergies    No Known Allergies    Intolerances        LABS:                        10.0   4.75  )-----------( 141      ( 10 Jameel 2023 06:00 )             31.0       01-10    136  |  99  |  6<L>  ----------------------------<  144<H>  4.0   |  26  |  0.52    Ca    9.9      10 Jameel 2023 06:00  Phos  4.4     01-10  Mg     1.70     01-10          RADIOLOGY & ADDITIONAL TESTS:  CTAP 12/23  No gross evidence for recurrent or metastatic disease. Focal patchy opacities in the superior segment of the left lower lobe not seen previously and in the left upper lobe, raising concern for infection. Previously visualized more diffuse groundglass opacity in the   left upper lobe is no longer identified. Trace left pleural fluid has decreased.    Nuclear Bone Scan 12/23:   Abnormal foci in the left femoral neck and lumbar spine highly suspicious for osseous metastases. Osseous lesion in the left femoral neck places the patient at risk for pathologic fracture.    MR pelvis 1/5/23  Metastatic lesions within the left femoral neck and superior articular facet of L5. The lesion in the left femoral neck likely places the patient at increased risk for pathologic fracture. Small left hip effusion

## 2023-01-11 NOTE — PROGRESS NOTE ADULT - ASSESSMENT
56 yo F with metastatic pancreatic CA (mets to L spine, L femur). She was first diagnosed with pancreatic cancer in 2018 and then in 1/2019 underwent a Whipple for Stage III pancreatic cancer, T3N1 with 1/24 LN positive. She did well until 7/2022 when she was found to have recurrent disease and was started on chemo with Abraxane and Gemzar. She tolerated chemotherapy well overall. She is now admitted with LLE pain x 1mo with difficulty ambulating x1wk. CT shows no evidence of metastatic disease at this time. Bone scan and MRI show evidence of progression of cancer with metastatic lesions within the left femoral neck and superior articular facet of L5. The lesion in the left femoral neck likely places the patient at increased risk for pathologic fracture.    -As she is only 57 with a small tumor burden and good PS until the recent POD she is a reasonable candidate for surgery to be followed by RT and a new chemo regimen.  -Discussed radiology findings with patient and daughter who would like surgery if it will help with the pain and mobility  -They understand that she should be evaluated for radiation following surgery  -They are interested in continuing treatment and understand that as she has had POD on the current chemo she should be evaluated for a new systemic therapy following surgery and RT  -She is a good candidate for additional chemotherapy.  -No plans for inpatient chemotherapy  -Appreciate  Palliative Care consult for pain management  -Monitor CBC daily, pt counts may go down in the setting of recent chemo  -Patient to followup with her primary oncologist Dr. Katty Navarro ([facility]) upon discharge  -C/w Supportive care, pain control, Nutrition, PT, DVT ppx  -Oncology will continue to follow with you    Summer Farias MD  Medical Oncology Attending  [phone]

## 2023-01-11 NOTE — PROGRESS NOTE ADULT - PROBLEM SELECTOR PLAN 2
- pt with hx pancreatic CA f/w Dr. Katty Navarro @ [facility]; known mets to L spine and L femur, on chemo (ambraxane and gemzar) last dose 12/27 and started radiation to L spine early December x1 session   - appreciate rad on, nsx and med onc recs   - plan for inpt RT

## 2023-01-11 NOTE — PROGRESS NOTE ADULT - SUBJECTIVE AND OBJECTIVE BOX
SUBJECTIVE AND OBJECTIVE:  Patient seen and examined at bedside. Patient being followed up for pain.   [facility] : [id number].   Patient states thigh pain is severe today and worse compared to day prior. States she has intermittent nausea and states she had an episode of emesis yesterday. Denies constipation  Patient states she wishes for her daughter to be contacted by Ortho team to clarify medical plan concerning recommendations for surgical interventions in conjunction with RT.    INTERVAL HPI/OVERNIGHT EVENTS:  Received 3 doses of Oxy IR 5mg q6 ATC doses and 1 prn dose of PO Oxy IR 10mg in past 24 hours.   Also received 1 dose of Compazine.    Allergies    No Known Allergies    Intolerances    MEDICATIONS  (STANDING):  bisacodyl 5 milliGRAM(s) Oral at bedtime  chlorhexidine 2% Cloths 1 Application(s) Topical daily  cholecalciferol 400 Unit(s) Oral daily  dextrose 5%. 1000 milliLiter(s) (50 mL/Hr) IV Continuous <Continuous>  dextrose 5%. 1000 milliLiter(s) (100 mL/Hr) IV Continuous <Continuous>  dextrose 50% Injectable 25 Gram(s) IV Push once  dextrose 50% Injectable 12.5 Gram(s) IV Push once  dextrose 50% Injectable 25 Gram(s) IV Push once  enoxaparin Injectable 40 milliGRAM(s) SubCutaneous every 24 hours  gabapentin 300 milliGRAM(s) Oral three times a day  glucagon  Injectable 1 milliGRAM(s) IntraMuscular once  influenza   Vaccine 0.5 milliLiter(s) IntraMuscular once  insulin lispro (ADMELOG) corrective regimen sliding scale   SubCutaneous three times a day before meals  insulin lispro (ADMELOG) corrective regimen sliding scale   SubCutaneous at bedtime  melatonin 3 milliGRAM(s) Oral at bedtime  methadone    Tablet 2.5 milliGRAM(s) Oral two times a day  montelukast 10 milliGRAM(s) Oral at bedtime  multivitamin 1 Tablet(s) Oral daily  ondansetron   Disintegrating Tablet 4 milliGRAM(s) Oral every 6 hours  oxyCODONE    IR 7.5 milliGRAM(s) Oral every 6 hours  pantoprazole    Tablet 40 milliGRAM(s) Oral before breakfast  polyethylene glycol 3350 17 Gram(s) Oral two times a day  senna 2 Tablet(s) Oral at bedtime    MEDICATIONS  (PRN):  acetaminophen     Tablet .. 650 milliGRAM(s) Oral every 6 hours PRN Temp greater or equal to 38C (100.4F), Mild Pain (1 - 3)  dextrose Oral Gel 15 Gram(s) Oral once PRN Blood Glucose LESS THAN 70 milliGRAM(s)/deciliter  oxyCODONE    IR 10 milliGRAM(s) Oral every 4 hours PRN Moderate Pain (4 - 6)  oxyCODONE    IR 15 milliGRAM(s) Oral every 4 hours PRN Severe Pain (7 - 10)  prochlorperazine   Injectable 5 milliGRAM(s) IV Push every 8 hours PRN refractory nausea    ITEMS UNCHECKED ARE NOT PRESENT    PRESENT SYMPTOMS: [ ]Unable to self-report due to altered mental status- see [ ] CPOT [ ] PAINADS [ ] RDOS  Source if other than patient:  [ ]Family   [ ]Team     Pain: [ x]yes [ ]no  QOL impact - severe  Location - left thigh                     Aggravating factors - movement   Quality - tingling   Radiation - left lower extremity and lower back  Timing- constant with intermittent worsening episodes   Severity (0-10 scale): 10  Minimal acceptable level / Pain Goal (0-10 scale):  7-8    Dyspnea:                           [ ]Mild [ ]Moderate [ ]Severe  Anxiety:                             [ ]Mild [ ]Moderate [ ]Severe  Agitation:                          [ ]Mild [ ]Moderate [ ]Severe  Fatigue:                             [ ]Mild [ ]Moderate [ ]Severe  Nausea:                             [x ]Mild [ ]Moderate [ ]Severe  Loss of appetite:              [x ]Mild [ ]Moderate [ ]Severe  Constipation:                   [ ]Mild [ ]Moderate [ ]Severe  Diarrhea:                          [ ]Mild [ ]Moderate [ ]Severe    CPOT:    https://[email or web address]/getattachment/[email or web address]/Critical-Care-Pain-Observation-Tool-(CPOT)    PCSSQ[Palliative Care Spiritual Screening Question]   Severity (0-10):  Score of 4 or > indicate consideration of Chaplaincy referral.  Chaplaincy Referral: [ ] yes [ ] refused [ ] following [x ] deferred    Caregiver [place]? : [ ] yes [ ] no [ ] Declined [x ] Deferred              Social work referral [ ] Patient & Family Centered Care Referral [ ]     Anticipatory Grief present?:  [ ] yes [ ] no  [ x] Deferred                  Social work referral [ ] Chaplaincy Referral[ ]    Other Symptoms:  [ x]All other review of systems negative     PHYSICAL EXAM:  Vital Signs Last 24 Hrs  T(C): 36.9 (11 Jan 2023 13:10), Max: 36.9 (11 Jan 2023 13:10)  T(F): 98.4 (11 Jan 2023 13:10), Max: 98.4 (11 Jan 2023 13:10)  HR: 73 (11 Jan 2023 13:10) (73 - 84)  BP: 100/53 (11 Jan 2023 13:10) (100/53 - 111/62)  BP(mean): --  RR: 17 (11 Jan 2023 06:03) (17 - 18)  SpO2: 96% (11 Jan 2023 13:10) (96% - 100%)    Parameters below as of 11 Jan 2023 13:10  Patient On (Oxygen Delivery Method): room air    GENERAL:  [x ]Alert  [x ]Oriented x 3  [ ]Lethargic  [ ]Cachexia  [ ]Unarousable  [x ]Verbal  [ ]Non-Verbal    Behavioral:   [ ] Anxiety  [ ] Delirium [ ] Agitation [ x] Calm  [ ] Other  HEENT:  [ x]Normal  [ ] Temporal Wasting  [ ]Dry mouth   [ ]ET Tube/Trach  [ ]Oral lesions  [ ] Mucositis  PULMONARY:   [ x]Clear [ ]Tachypnea  [ ]Audible excessive secretions   [ ]Rhonchi        [ ]Right [ ]Left [ ]Bilateral  [ ]Crackles        [ ]Right [ ]Left [ ]Bilateral  [ ]Wheezing     [ ]Right [ ]Left [ ]Bilateral  [ ]Diminished breath sounds [ ]right [ ]left [ ]bilateral  CARDIOVASCULAR:    [ x]Regular [ ]Irregular [ ]Tachy  [ ]Dmitry [ ]Murmur [ ]Other  GASTROINTESTINAL:  [ x]Soft  [ ]Distended   [ ]+BS  [x ]Non tender [ ]Tender  [ ]PEG [ ]OGT/ NGT  Last BM: 1/10  GENITOURINARY:   [x ]Normal [ ] Incontinent   [ ]Oliguria/Anuria   [ ]Swanson  MUSCULOSKELETAL:   [x ]Normal , except left lower extremity movement limited due to pain  [ ]Weakness  [ ]Bed/Wheelchair bound [ ]Edema  [  ] amputation  [  ] contraction  NEUROLOGIC:   [ x]No focal deficits  [ ]Cognitive impairment  [ ]Dysphagia [ ]Dysarthria [ ]Paresis [ ]Other   SKIN: See Nursing Skin Assessment for further details  [x ]Normal    [ ]Rash  [ ]Pressure ulcer(s)       Present on admission [ ]y [ ]n   [  ]  Wound    [  ] hyperpigmentation      CRITICAL CARE:  [ ]Shock Present  [ ]Septic [ ]Cardiogenic [ ]Neurologic [ ]Hypovolemic  [ ]Vasopressors [ ]Inotropes  [ ]Respiratory failure present [ ]Mechanical Ventilation [ ]Non-invasive ventilatory support [ ]High-Flow   [ ]Acute  [ ]Chronic [ ]Hypoxic  [ ]Hypercarbic [ ]Other  [ ]Other organ failure     LABS:                                           10.0   4.75  )-----------( 141      ( 10 Jameel 2023 06:00 )             31.0       01-10    136  |  99  |  6<L>  ----------------------------<  144<H>  4.0   |  26  |  0.52    Ca    9.9      10 Jameel 2023 06:00  Phos  4.4     01-10  Mg     1.70     01-10    RADIOLOGY & ADDITIONAL STUDIES: reviewed     Protein Calorie Malnutrition Present: [ ]mild [ ]moderate [ ]severe [ ]underweight [ ]morbid obesity  https://www.andeal.org/vault/2440/web/files/ONC/Table_Clinical%20Characteristics%20to%20Document%20Malnutrition-White%20JV%20et%20al%202012.pdf    Height (cm): 149 (12-29-22 @ 22:41), 149 (12-27-22 @ 16:21), 147.3 (10-22-22 @ 08:57)  Weight (kg): 43.5 (12-27-22 @ 16:21), 47 (10-22-22 @ 08:57), 45.9 (07-20-22 @ 11:51)  BMI (kg/m2): 19.6 (12-29-22 @ 22:41), 19.6 (12-27-22 @ 16:21), 21.7 (10-22-22 @ 08:57)    [ ]PPSV2 < or = 30%  [ ]significant weight loss [ ]poor nutritional intake [ ]anasarca   [ ]Artificial Nutrition    REFERRALS:   [ ]Chaplaincy  [ ]Hospice  [ ]Child Life  [ ]Social Work  [ x]Case management [ ]Holistic Therapy

## 2023-01-11 NOTE — CHART NOTE - NSCHARTNOTEFT_GEN_A_CORE
Family wishes to speak with orthopedics; as such, we will now be holding  planned radiation treatment to the left femur at risk of fracture per imaging below.     Spoke with Dr. Gutierrez who had prior talks with Dr. Villa that surgery would not be offered  unless there was an actual fracture, not fracture risk.    Will follow.,           < from: MR Pelvis Bony Only w/wo IV Cont (01.05.23 @ 18:56) >    IMPRESSION: Metastatic lesions within the left femoral neck and superior   articular facet of L5. The lesion in the left femoral neck likely places   the patient at increased risk for pathologic fracture.  Small left hip effusion.    < end of copied text >        < from: CT Abdomen and Pelvis w/ IV Cont (12.23.22 @ 11:42) >    IMPRESSION:  No gross evidence for recurrent or metastatic disease.    Focal patchy opacities in the superior segment of the left lower lobe not   seen previously and in the left upper lobe, raising concern for   infection. Previously visualized more diffuse groundglass opacity in the   left upper lobe is no longer identified. Please correlate clinically.   Trace left pleural fluid has decreased.    < end of copied text >

## 2023-01-11 NOTE — CHART NOTE - NSCHARTNOTEFT_GEN_A_CORE
Re-discussed case with ortho team today; awaiting input from ortho attending.  Ortho inquired about pt's prognosis. Discussed with Onc attg Dr. Farias, awaiting input.

## 2023-01-12 LAB
ANION GAP SERPL CALC-SCNC: 11 MMOL/L — SIGNIFICANT CHANGE UP (ref 7–14)
BLD GP AB SCN SERPL QL: NEGATIVE — SIGNIFICANT CHANGE UP
BUN SERPL-MCNC: 7 MG/DL — SIGNIFICANT CHANGE UP (ref 7–23)
CALCIUM SERPL-MCNC: 9.5 MG/DL — SIGNIFICANT CHANGE UP (ref 8.4–10.5)
CHLORIDE SERPL-SCNC: 101 MMOL/L — SIGNIFICANT CHANGE UP (ref 98–107)
CO2 SERPL-SCNC: 24 MMOL/L — SIGNIFICANT CHANGE UP (ref 22–31)
CREAT SERPL-MCNC: 0.56 MG/DL — SIGNIFICANT CHANGE UP (ref 0.5–1.3)
EGFR: 106 ML/MIN/1.73M2 — SIGNIFICANT CHANGE UP
GLUCOSE BLDC GLUCOMTR-MCNC: 150 MG/DL — HIGH (ref 70–99)
GLUCOSE BLDC GLUCOMTR-MCNC: 164 MG/DL — HIGH (ref 70–99)
GLUCOSE BLDC GLUCOMTR-MCNC: 186 MG/DL — HIGH (ref 70–99)
GLUCOSE BLDC GLUCOMTR-MCNC: 274 MG/DL — HIGH (ref 70–99)
GLUCOSE SERPL-MCNC: 156 MG/DL — HIGH (ref 70–99)
HCT VFR BLD CALC: 28.7 % — LOW (ref 34.5–45)
HGB BLD-MCNC: 9.3 G/DL — LOW (ref 11.5–15.5)
MAGNESIUM SERPL-MCNC: 1.8 MG/DL — SIGNIFICANT CHANGE UP (ref 1.6–2.6)
MCHC RBC-ENTMCNC: 25.3 PG — LOW (ref 27–34)
MCHC RBC-ENTMCNC: 32.4 GM/DL — SIGNIFICANT CHANGE UP (ref 32–36)
MCV RBC AUTO: 78.2 FL — LOW (ref 80–100)
NRBC # BLD: 0 /100 WBCS — SIGNIFICANT CHANGE UP (ref 0–0)
NRBC # FLD: 0 K/UL — SIGNIFICANT CHANGE UP (ref 0–0)
PHOSPHATE SERPL-MCNC: 3.7 MG/DL — SIGNIFICANT CHANGE UP (ref 2.5–4.5)
PLATELET # BLD AUTO: 145 K/UL — LOW (ref 150–400)
POTASSIUM SERPL-MCNC: 4.1 MMOL/L — SIGNIFICANT CHANGE UP (ref 3.5–5.3)
POTASSIUM SERPL-SCNC: 4.1 MMOL/L — SIGNIFICANT CHANGE UP (ref 3.5–5.3)
RBC # BLD: 3.67 M/UL — LOW (ref 3.8–5.2)
RBC # FLD: 15.6 % — HIGH (ref 10.3–14.5)
RH IG SCN BLD-IMP: POSITIVE — SIGNIFICANT CHANGE UP
SODIUM SERPL-SCNC: 136 MMOL/L — SIGNIFICANT CHANGE UP (ref 135–145)
WBC # BLD: 4.37 K/UL — SIGNIFICANT CHANGE UP (ref 3.8–10.5)
WBC # FLD AUTO: 4.37 K/UL — SIGNIFICANT CHANGE UP (ref 3.8–10.5)

## 2023-01-12 PROCEDURE — 93010 ELECTROCARDIOGRAM REPORT: CPT

## 2023-01-12 PROCEDURE — 99233 SBSQ HOSP IP/OBS HIGH 50: CPT

## 2023-01-12 RX ORDER — OXYCODONE HYDROCHLORIDE 5 MG/1
7.5 TABLET ORAL EVERY 6 HOURS
Refills: 0 | Status: DISCONTINUED | OUTPATIENT
Start: 2023-01-12 | End: 2023-01-12

## 2023-01-12 RX ORDER — METHADONE HYDROCHLORIDE 40 MG/1
2.5 TABLET ORAL
Refills: 0 | Status: DISCONTINUED | OUTPATIENT
Start: 2023-01-12 | End: 2023-01-12

## 2023-01-12 RX ORDER — OXYCODONE HYDROCHLORIDE 5 MG/1
15 TABLET ORAL EVERY 4 HOURS
Refills: 0 | Status: DISCONTINUED | OUTPATIENT
Start: 2023-01-12 | End: 2023-01-17

## 2023-01-12 RX ORDER — METHADONE HYDROCHLORIDE 40 MG/1
2.5 TABLET ORAL EVERY 8 HOURS
Refills: 0 | Status: DISCONTINUED | OUTPATIENT
Start: 2023-01-12 | End: 2023-01-18

## 2023-01-12 RX ORDER — OXYCODONE HYDROCHLORIDE 5 MG/1
10 TABLET ORAL EVERY 4 HOURS
Refills: 0 | Status: DISCONTINUED | OUTPATIENT
Start: 2023-01-12 | End: 2023-01-17

## 2023-01-12 RX ADMIN — ONDANSETRON 4 MILLIGRAM(S): 8 TABLET, FILM COATED ORAL at 14:13

## 2023-01-12 RX ADMIN — GABAPENTIN 300 MILLIGRAM(S): 400 CAPSULE ORAL at 06:18

## 2023-01-12 RX ADMIN — POLYETHYLENE GLYCOL 3350 17 GRAM(S): 17 POWDER, FOR SOLUTION ORAL at 06:18

## 2023-01-12 RX ADMIN — METHADONE HYDROCHLORIDE 2.5 MILLIGRAM(S): 40 TABLET ORAL at 21:52

## 2023-01-12 RX ADMIN — PANTOPRAZOLE SODIUM 40 MILLIGRAM(S): 20 TABLET, DELAYED RELEASE ORAL at 06:17

## 2023-01-12 RX ADMIN — Medication 400 UNIT(S): at 12:12

## 2023-01-12 RX ADMIN — METHADONE HYDROCHLORIDE 2.5 MILLIGRAM(S): 40 TABLET ORAL at 06:17

## 2023-01-12 RX ADMIN — ONDANSETRON 4 MILLIGRAM(S): 8 TABLET, FILM COATED ORAL at 21:54

## 2023-01-12 RX ADMIN — OXYCODONE HYDROCHLORIDE 7.5 MILLIGRAM(S): 5 TABLET ORAL at 12:10

## 2023-01-12 RX ADMIN — OXYCODONE HYDROCHLORIDE 7.5 MILLIGRAM(S): 5 TABLET ORAL at 07:18

## 2023-01-12 RX ADMIN — OXYCODONE HYDROCHLORIDE 7.5 MILLIGRAM(S): 5 TABLET ORAL at 17:37

## 2023-01-12 RX ADMIN — ENOXAPARIN SODIUM 40 MILLIGRAM(S): 100 INJECTION SUBCUTANEOUS at 18:07

## 2023-01-12 RX ADMIN — Medication 2: at 18:05

## 2023-01-12 RX ADMIN — Medication 1 TABLET(S): at 12:13

## 2023-01-12 RX ADMIN — ONDANSETRON 4 MILLIGRAM(S): 8 TABLET, FILM COATED ORAL at 05:32

## 2023-01-12 RX ADMIN — OXYCODONE HYDROCHLORIDE 7.5 MILLIGRAM(S): 5 TABLET ORAL at 00:59

## 2023-01-12 RX ADMIN — OXYCODONE HYDROCHLORIDE 7.5 MILLIGRAM(S): 5 TABLET ORAL at 13:10

## 2023-01-12 RX ADMIN — OXYCODONE HYDROCHLORIDE 7.5 MILLIGRAM(S): 5 TABLET ORAL at 01:30

## 2023-01-12 RX ADMIN — Medication 2: at 09:08

## 2023-01-12 RX ADMIN — GABAPENTIN 300 MILLIGRAM(S): 400 CAPSULE ORAL at 14:13

## 2023-01-12 RX ADMIN — GABAPENTIN 300 MILLIGRAM(S): 400 CAPSULE ORAL at 21:54

## 2023-01-12 RX ADMIN — POLYETHYLENE GLYCOL 3350 17 GRAM(S): 17 POWDER, FOR SOLUTION ORAL at 18:08

## 2023-01-12 RX ADMIN — CHLORHEXIDINE GLUCONATE 1 APPLICATION(S): 213 SOLUTION TOPICAL at 12:12

## 2023-01-12 RX ADMIN — Medication 6: at 12:29

## 2023-01-12 RX ADMIN — ONDANSETRON 4 MILLIGRAM(S): 8 TABLET, FILM COATED ORAL at 09:08

## 2023-01-12 RX ADMIN — OXYCODONE HYDROCHLORIDE 7.5 MILLIGRAM(S): 5 TABLET ORAL at 18:40

## 2023-01-12 RX ADMIN — OXYCODONE HYDROCHLORIDE 7.5 MILLIGRAM(S): 5 TABLET ORAL at 06:18

## 2023-01-12 RX ADMIN — MONTELUKAST 10 MILLIGRAM(S): 4 TABLET, CHEWABLE ORAL at 21:54

## 2023-01-12 NOTE — PROGRESS NOTE ADULT - PROBLEM SELECTOR PLAN 2
- pt with hx pancreatic CA f/w Dr. Katty Navarro @ [facility]; known mets to L spine and L femur, on chemo (ambraxane and gemzar) last dose 12/27 and started radiation to L spine early December x1 session   - appreciate rad on, nsx and med onc recs   - plan for inpt RT as above

## 2023-01-12 NOTE — PROGRESS NOTE ADULT - SUBJECTIVE AND OBJECTIVE BOX
Orthopaedic Surgery Progress Note    Subjective:   Patient seen and examined. No acute events overnight. States pain is controlled. Denies fever/chills/chest pain/shortness of breath/numbness/tingling.    Objective:  T(C): 36.6 (01-12-23 @ 06:25), Max: 36.9 (01-11-23 @ 13:10)  HR: 79 (01-12-23 @ 06:25) (73 - 85)  BP: 100/57 (01-12-23 @ 06:25) (100/53 - 107/61)  RR: 17 (01-12-23 @ 06:25) (17 - 18)  SpO2: 97% (01-12-23 @ 06:25) (96% - 100%)  Wt(kg): --      Physical Exam:  Gen: NAD, alert and oriented  Resp: Unlabored breathing  LLE: Skin intact, no ecchymosis,        SILT DP/SP/ Denise/Saph,        +EHL/FHL/TA/Gastroc,        Hip/Knee/ankle painless ROM,       DP+,        soft compartments, no calf ttp,        mild pain w/ log roll.

## 2023-01-12 NOTE — PROGRESS NOTE ADULT - PROBLEM SELECTOR PLAN 1
p/w 1mo progressive LLE pain and difficulty ambulating x1wk; likely in setting of known L femur metastatic disease; previously on 5mg oxycodone and increased to 10mg; f/w pain management and recently started on methadone 5mg in the last week without relief  - XR L knee/hip/pelvis/femur neg acute fx  - pain control per palliative: 2.5 mg methadone, oxycodone 5mg q8 standing (pt can refuse), 10 mg q 4 for mod pain and 15 mg IR q 4 for severe pain.  - bowel regimen while on opioids- last BM 1/8   - MRI 1/5 completed, no surgical intervention. Plan for RT 5 sessions starting 1/13

## 2023-01-12 NOTE — PROGRESS NOTE ADULT - ASSESSMENT
57y Female with pancreatic adenocarcinoma s/p Whipple 2019 w/ mets to L femoral neck and lumbar spine  - Pain control   - NWB LLE, bedrest  - Appreciate care per primary

## 2023-01-12 NOTE — PROGRESS NOTE ADULT - SUBJECTIVE AND OBJECTIVE BOX
SUBJECTIVE AND OBJECTIVE:  Patient seen and examined at bedside. Patient being followed up for pain.   Patient declined Khmer ; preferred for daughter at bedside to translate.  Patient with better pain control with increased dosing of ATC oxycodone but still with intermittent pain. No unexpected adverse effects of opiates noted: no sedation/dizziness/nausea. Denies constipation  Daughter share per their discussions with team : no plan for surgery at this time and wish to proceed with RT intervention at this time     INTERVAL HPI/OVERNIGHT EVENTS:  Received 4 doses of the ATC Oxy IR 7.5mg in past 24 hours.     Allergies    No Known Allergies    Intolerances    MEDICATIONS  (STANDING):  bisacodyl 5 milliGRAM(s) Oral at bedtime  chlorhexidine 2% Cloths 1 Application(s) Topical daily  cholecalciferol 400 Unit(s) Oral daily  dextrose 5%. 1000 milliLiter(s) (50 mL/Hr) IV Continuous <Continuous>  dextrose 5%. 1000 milliLiter(s) (100 mL/Hr) IV Continuous <Continuous>  dextrose 50% Injectable 25 Gram(s) IV Push once  dextrose 50% Injectable 12.5 Gram(s) IV Push once  dextrose 50% Injectable 25 Gram(s) IV Push once  enoxaparin Injectable 40 milliGRAM(s) SubCutaneous every 24 hours  gabapentin 300 milliGRAM(s) Oral three times a day  glucagon  Injectable 1 milliGRAM(s) IntraMuscular once  influenza   Vaccine 0.5 milliLiter(s) IntraMuscular once  insulin lispro (ADMELOG) corrective regimen sliding scale   SubCutaneous three times a day before meals  insulin lispro (ADMELOG) corrective regimen sliding scale   SubCutaneous at bedtime  melatonin 3 milliGRAM(s) Oral at bedtime  methadone    Tablet 2.5 milliGRAM(s) Oral every 8 hours  montelukast 10 milliGRAM(s) Oral at bedtime  multivitamin 1 Tablet(s) Oral daily  ondansetron   Disintegrating Tablet 4 milliGRAM(s) Oral every 6 hours  pantoprazole    Tablet 40 milliGRAM(s) Oral before breakfast  polyethylene glycol 3350 17 Gram(s) Oral two times a day  senna 2 Tablet(s) Oral at bedtime    MEDICATIONS  (PRN):  acetaminophen     Tablet .. 650 milliGRAM(s) Oral every 6 hours PRN Temp greater or equal to 38C (100.4F), Mild Pain (1 - 3)  dextrose Oral Gel 15 Gram(s) Oral once PRN Blood Glucose LESS THAN 70 milliGRAM(s)/deciliter  oxyCODONE    IR 10 milliGRAM(s) Oral every 4 hours PRN Moderate Pain (4 - 6)  oxyCODONE    IR 15 milliGRAM(s) Oral every 4 hours PRN Severe Pain (7 - 10)  prochlorperazine   Injectable 5 milliGRAM(s) IV Push every 8 hours PRN refractory nausea    ITEMS UNCHECKED ARE NOT PRESENT    PRESENT SYMPTOMS: [ ]Unable to self-report due to altered mental status- see [ ] CPOT [ ] PAINADS [ ] RDOS  Source if other than patient:  [ ]Family   [ ]Team     Pain: [ x]yes [ ]no  QOL impact - severe  Location - left thigh                     Aggravating factors - movement   Quality - tingling   Radiation - left lower extremity and lower back  Timing- constant with intermittent worsening episodes   Severity (0-10 scale): 10  Minimal acceptable level / Pain Goal (0-10 scale):  7-8    Dyspnea:                           [ ]Mild [ ]Moderate [ ]Severe  Anxiety:                             [ ]Mild [ ]Moderate [ ]Severe  Agitation:                          [ ]Mild [ ]Moderate [ ]Severe  Fatigue:                             [ ]Mild [ ]Moderate [ ]Severe  Nausea:                             [x ]Mild [ ]Moderate [ ]Severe  Loss of appetite:              [x ]Mild [ ]Moderate [ ]Severe  Constipation:                   [ ]Mild [ ]Moderate [ ]Severe  Diarrhea:                          [ ]Mild [ ]Moderate [ ]Severe    CPOT:    https://www.[facility].org/getattachment/[email or web address]/Critical-Care-Pain-Observation-Tool-(CPOT)    PCSSQ[Palliative Care Spiritual Screening Question]   Severity (0-10):  Score of 4 or > indicate consideration of Chaplaincy referral.  Chaplaincy Referral: [ ] yes [ ] refused [ ] following [x ] deferred    Caregiver [place]? : [ ] yes [ ] no [ ] Declined [x ] Deferred              Social work referral [ ] Patient & Family Centered Care Referral [ ]     Anticipatory Grief present?:  [ ] yes [ ] no  [ x] Deferred                  Social work referral [ ] Chaplaincy Referral[ ]    Other Symptoms:  [ x]All other review of systems negative     PHYSICAL EXAM:  Vital Signs Last 24 Hrs  T(C): 36.9 (12 Jan 2023 13:29), Max: 36.9 (12 Jan 2023 13:29)  T(F): 98.4 (12 Jan 2023 13:29), Max: 98.4 (12 Jan 2023 13:29)  HR: 75 (12 Jan 2023 13:29) (75 - 85)  BP: 104/61 (12 Jan 2023 13:29) (100/57 - 107/61)  BP(mean): --  RR: 17 (12 Jan 2023 06:25) (17 - 18)  SpO2: 100% (12 Jan 2023 13:29) (97% - 100%)    Parameters below as of 12 Jan 2023 13:29  Patient On (Oxygen Delivery Method): room air    GENERAL:  [x ]Alert  [x ]Oriented x 3  [ ]Lethargic  [ ]Cachexia  [ ]Unarousable  [x ]Verbal  [ ]Non-Verbal    Behavioral:   [ ] Anxiety  [ ] Delirium [ ] Agitation [ x] Calm  [ ] Other  HEENT:  [ x]Normal  [ ] Temporal Wasting  [ ]Dry mouth   [ ]ET Tube/Trach  [ ]Oral lesions  [ ] Mucositis  PULMONARY:   [ x]Clear [ ]Tachypnea  [ ]Audible excessive secretions   [ ]Rhonchi        [ ]Right [ ]Left [ ]Bilateral  [ ]Crackles        [ ]Right [ ]Left [ ]Bilateral  [ ]Wheezing     [ ]Right [ ]Left [ ]Bilateral  [ ]Diminished breath sounds [ ]right [ ]left [ ]bilateral  CARDIOVASCULAR:    [ x]Regular [ ]Irregular [ ]Tachy  [ ]Dmitry [ ]Murmur [ ]Other  GASTROINTESTINAL:  [ x]Soft  [ ]Distended   [ ]+BS  [x ]Non tender [ ]Tender  [ ]PEG [ ]OGT/ NGT  Last BM: 1/11  GENITOURINARY:   [x ]Normal [ ] Incontinent   [ ]Oliguria/Anuria   [ ]Swanson  MUSCULOSKELETAL:   [x ]Normal , except left lower extremity movement limited due to pain  [ ]Weakness  [ ]Bed/Wheelchair bound [ ]Edema  [  ] amputation  [  ] contraction  NEUROLOGIC:   [ x]No focal deficits  [ ]Cognitive impairment  [ ]Dysphagia [ ]Dysarthria [ ]Paresis [ ]Other   SKIN: See Nursing Skin Assessment for further details  [x ]Normal    [ ]Rash  [ ]Pressure ulcer(s)       Present on admission [ ]y [ ]n   [  ]  Wound    [  ] hyperpigmentation      CRITICAL CARE:  [ ]Shock Present  [ ]Septic [ ]Cardiogenic [ ]Neurologic [ ]Hypovolemic  [ ]Vasopressors [ ]Inotropes  [ ]Respiratory failure present [ ]Mechanical Ventilation [ ]Non-invasive ventilatory support [ ]High-Flow   [ ]Acute  [ ]Chronic [ ]Hypoxic  [ ]Hypercarbic [ ]Other  [ ]Other organ failure     LABS:                                                            9.3    4.37  )-----------( 145      ( 12 Jan 2023 06:20 )             28.7       01-12    136  |  101  |  7   ----------------------------<  156<H>  4.1   |  24  |  0.56    Ca    9.5      12 Jan 2023 06:20  Phos  3.7     01-12  Mg     1.80     01-12          RADIOLOGY & ADDITIONAL STUDIES: reviewed     Protein Calorie Malnutrition Present: [ ]mild [ ]moderate [ ]severe [ ]underweight [ ]morbid obesity  https://www.andeal.org/vault/2440/web/files/ONC/Table_Clinical%20Characteristics%20to%20Document%20Malnutrition-White%20JV%20et%20al%202012.pdf    Height (cm): 149 (12-29-22 @ 22:41), 149 (12-27-22 @ 16:21), 147.3 (10-22-22 @ 08:57)  Weight (kg): 43.5 (12-27-22 @ 16:21), 47 (10-22-22 @ 08:57), 45.9 (07-20-22 @ 11:51)  BMI (kg/m2): 19.6 (12-29-22 @ 22:41), 19.6 (12-27-22 @ 16:21), 21.7 (10-22-22 @ 08:57)    [ ]PPSV2 < or = 30%  [ ]significant weight loss [ ]poor nutritional intake [ ]anasarca   [ ]Artificial Nutrition    REFERRALS:   [ ]Chaplaincy  [ ]Hospice  [ ]Child Life  [ ]Social Work  [ x]Case management [ ]Holistic Therapy

## 2023-01-12 NOTE — PROGRESS NOTE ADULT - ASSESSMENT
Pt is a 58 yo F with PMH pancreat CA (mets to L spine, L femur; Cong, chemo ambraxane/gemzar 12/27, radiation ?12/15 and s/p Whipple in 2017), T2D, GERD, asthma, and HTN p/w LLE pain x 1mo with difficulty ambulating x1wk. Likely in the setting of known metastatic disease, XR showed lucency on the L femoral neck this admission.  Course complicated by ongoing severe pain, nausea, vomiting and constipation. Currently on narcotic regimen per Palliative care.

## 2023-01-12 NOTE — PROGRESS NOTE ADULT - PROBLEM SELECTOR PLAN 2
- MRI - Metastatic lesions within the left femoral neck and superior articular facet of L5. The lesion in the left femoral neck likely places the patient at increased risk for pathologic fracture. Small left hip effusion.  - Ortho and Neurosurgery recommendations appreciated  - Rad/onc recommendations appreciated - plan for 5 fractions of RT   - Increase to PO Methadone 2.5mg TID (QTC on 1/12 - 438)  - D/C PO Oxy IR 7.5mg q6 ATC   - PO Gabapentin 300mg TID  - PO Oxy IR 10mg q4 PRN moderate pain (hold for hypotension, oversedation, respiratory depression)  - PO Oxy IR 15mg q4PRN severe pain (hold for hypotension, oversedation, respiratory depression)  - Encouraged patient to ask for prn doses if having pain. Educated/counseled patient and daughter about pain regimen.   - Bowel regimen while on opiates.

## 2023-01-12 NOTE — PROGRESS NOTE ADULT - SUBJECTIVE AND OBJECTIVE BOX
[facility] Department of Hospital Medicine  Lorie Soto DO  Available on MS Teams  Pager: [phone]    Patient is a 57y old  Female who presents with a chief complaint of Difficulty ambulating (09 Jan 2023 15:07)    Subjective:  Pt seen and examined at bedside sleeping comfortably. Her birthday was yesterday and family visited in the evening. Daughter bedside, understanding of plan. No plan for orthopedic surgery at this time. Plan to move forward with radiation tomorrow and then pending status after radiation, consider ortho evaluation. No other concerns or complaints.    Vital Signs Last 24 Hrs  T(C): 36.9 (12 Jan 2023 13:29), Max: 36.9 (12 Jan 2023 13:29)  T(F): 98.4 (12 Jan 2023 13:29), Max: 98.4 (12 Jan 2023 13:29)  HR: 75 (12 Jan 2023 13:29) (75 - 85)  BP: 104/61 (12 Jan 2023 13:29) (100/57 - 107/61)  BP(mean): --  RR: 17 (12 Jan 2023 06:25) (17 - 18)  SpO2: 100% (12 Jan 2023 13:29) (97% - 100%)    Parameters below as of 12 Jan 2023 13:29  Patient On (Oxygen Delivery Method): room air    PHYSICAL EXAM:  Constitutional: cachectic elderly F; sleeping comfortably in bed  Head: NC/AT  Eyes: PERRL, EOMI, anicteric sclera  ENT: no nasal discharge; MMM  Neck: supple; no JVD  Respiratory: CTA B/L; no W/R/R; on RA without respiratory distress  Cardiac: +S1/S2; RRR; no M/R/G  Gastrointestinal: soft, scaphoid abd; NT/ND; no rebound or guarding; +BSx4  Extremities: WWP, no clubbing or cyanosis; no peripheral edema; peripheral mm wasting  Musculoskeletal: moves all extremities spontaneously  Vascular: 2+ radial, DP/PT pulses B/L  Dermatologic: skin warm, dry and intact; no rashes, wounds, or scars  Neurologic: CNII-XII grossly intact; no focal deficits  Psychiatric: affect and characteristics of appearance, verbalizations, behaviors are appropriate    MEDICATIONS  (STANDING):  bisacodyl 5 milliGRAM(s) Oral at bedtime  chlorhexidine 2% Cloths 1 Application(s) Topical daily  cholecalciferol 400 Unit(s) Oral daily  dextrose 5%. 1000 milliLiter(s) (100 mL/Hr) IV Continuous <Continuous>  dextrose 5%. 1000 milliLiter(s) (50 mL/Hr) IV Continuous <Continuous>  dextrose 50% Injectable 25 Gram(s) IV Push once  dextrose 50% Injectable 12.5 Gram(s) IV Push once  dextrose 50% Injectable 25 Gram(s) IV Push once  enoxaparin Injectable 40 milliGRAM(s) SubCutaneous every 24 hours  gabapentin 300 milliGRAM(s) Oral three times a day  glucagon  Injectable 1 milliGRAM(s) IntraMuscular once  influenza   Vaccine 0.5 milliLiter(s) IntraMuscular once  insulin lispro (ADMELOG) corrective regimen sliding scale   SubCutaneous three times a day before meals  insulin lispro (ADMELOG) corrective regimen sliding scale   SubCutaneous at bedtime  melatonin 3 milliGRAM(s) Oral at bedtime  methadone    Tablet 2.5 milliGRAM(s) Oral two times a day  montelukast 10 milliGRAM(s) Oral at bedtime  multivitamin 1 Tablet(s) Oral daily  ondansetron   Disintegrating Tablet 4 milliGRAM(s) Oral every 6 hours  oxyCODONE    IR 7.5 milliGRAM(s) Oral every 6 hours  pantoprazole    Tablet 40 milliGRAM(s) Oral before breakfast  polyethylene glycol 3350 17 Gram(s) Oral two times a day  senna 2 Tablet(s) Oral at bedtime    MEDICATIONS  (PRN):  acetaminophen     Tablet .. 650 milliGRAM(s) Oral every 6 hours PRN Temp greater or equal to 38C (100.4F), Mild Pain (1 - 3)  dextrose Oral Gel 15 Gram(s) Oral once PRN Blood Glucose LESS THAN 70 milliGRAM(s)/deciliter  oxyCODONE    IR 10 milliGRAM(s) Oral every 4 hours PRN Moderate Pain (4 - 6)  oxyCODONE    IR 15 milliGRAM(s) Oral every 4 hours PRN Severe Pain (7 - 10)  prochlorperazine   Injectable 5 milliGRAM(s) IV Push every 8 hours PRN refractory nausea      LABS:                        9.3    4.37  )-----------( 145      ( 12 Jan 2023 06:20 )             28.7     01-12    136  |  101  |  7   ----------------------------<  156<H>  4.1   |  24  |  0.56    Ca    9.5      12 Jan 2023 06:20  Phos  3.7     01-12  Mg     1.80     01-12          CAPILLARY BLOOD GLUCOSE      POCT Blood Glucose.: 274 mg/dL (12 Jan 2023 12:19)      RADIOLOGY & ADDITIONAL TESTS: Reviewed.

## 2023-01-12 NOTE — CHART NOTE - NSCHARTNOTEFT_GEN_A_CORE
As per discussion with Dr. Michel. If QTC within acceptable parameter. Increase Methadone to 2.5 mg TID and discontinue Oxy atc.

## 2023-01-12 NOTE — PROGRESS NOTE ADULT - PROBLEM SELECTOR PLAN 4
Symptom management recommendations discussed with primary team   Thank you for allowing us to participate in your patient's care. Please page [phone] for any questions/concerns.

## 2023-01-12 NOTE — CHART NOTE - NSCHARTNOTEFT_GEN_A_CORE
Spoke to orthopedics today;     will still hold RT.    Orthopedics in discussion and with family to determine if there is a surgical option still.           < from: MR Pelvis Bony Only w/wo IV Cont (01.05.23 @ 18:56) >    IMPRESSION: Metastatic lesions within the left femoral neck and superior   articular facet of L5. The lesion in the left femoral neck likely places   the patient at increased risk for pathologic fracture.  Small left hip effusion.      < end of copied text >

## 2023-01-13 ENCOUNTER — APPOINTMENT (OUTPATIENT)
Dept: HEMATOLOGY ONCOLOGY | Facility: CLINIC | Age: 58
End: 2023-01-13

## 2023-01-13 LAB
ANION GAP SERPL CALC-SCNC: 7 MMOL/L — SIGNIFICANT CHANGE UP (ref 7–14)
BUN SERPL-MCNC: 5 MG/DL — LOW (ref 7–23)
CALCIUM SERPL-MCNC: 9.6 MG/DL — SIGNIFICANT CHANGE UP (ref 8.4–10.5)
CHLORIDE SERPL-SCNC: 101 MMOL/L — SIGNIFICANT CHANGE UP (ref 98–107)
CO2 SERPL-SCNC: 26 MMOL/L — SIGNIFICANT CHANGE UP (ref 22–31)
CREAT SERPL-MCNC: 0.57 MG/DL — SIGNIFICANT CHANGE UP (ref 0.5–1.3)
EGFR: 106 ML/MIN/1.73M2 — SIGNIFICANT CHANGE UP
GLUCOSE BLDC GLUCOMTR-MCNC: 126 MG/DL — HIGH (ref 70–99)
GLUCOSE BLDC GLUCOMTR-MCNC: 141 MG/DL — HIGH (ref 70–99)
GLUCOSE BLDC GLUCOMTR-MCNC: 175 MG/DL — HIGH (ref 70–99)
GLUCOSE BLDC GLUCOMTR-MCNC: 191 MG/DL — HIGH (ref 70–99)
GLUCOSE SERPL-MCNC: 145 MG/DL — HIGH (ref 70–99)
HCT VFR BLD CALC: 28.7 % — LOW (ref 34.5–45)
HGB BLD-MCNC: 9.2 G/DL — LOW (ref 11.5–15.5)
MAGNESIUM SERPL-MCNC: 1.8 MG/DL — SIGNIFICANT CHANGE UP (ref 1.6–2.6)
MCHC RBC-ENTMCNC: 25.3 PG — LOW (ref 27–34)
MCHC RBC-ENTMCNC: 32.1 GM/DL — SIGNIFICANT CHANGE UP (ref 32–36)
MCV RBC AUTO: 78.8 FL — LOW (ref 80–100)
NRBC # BLD: 0 /100 WBCS — SIGNIFICANT CHANGE UP (ref 0–0)
NRBC # FLD: 0 K/UL — SIGNIFICANT CHANGE UP (ref 0–0)
PHOSPHATE SERPL-MCNC: 4.3 MG/DL — SIGNIFICANT CHANGE UP (ref 2.5–4.5)
PLATELET # BLD AUTO: 168 K/UL — SIGNIFICANT CHANGE UP (ref 150–400)
POTASSIUM SERPL-MCNC: 4.3 MMOL/L — SIGNIFICANT CHANGE UP (ref 3.5–5.3)
POTASSIUM SERPL-SCNC: 4.3 MMOL/L — SIGNIFICANT CHANGE UP (ref 3.5–5.3)
RBC # BLD: 3.64 M/UL — LOW (ref 3.8–5.2)
RBC # FLD: 15.6 % — HIGH (ref 10.3–14.5)
SODIUM SERPL-SCNC: 134 MMOL/L — LOW (ref 135–145)
WBC # BLD: 4.68 K/UL — SIGNIFICANT CHANGE UP (ref 3.8–10.5)
WBC # FLD AUTO: 4.68 K/UL — SIGNIFICANT CHANGE UP (ref 3.8–10.5)

## 2023-01-13 PROCEDURE — 99497 ADVNCD CARE PLAN 30 MIN: CPT | Mod: 25

## 2023-01-13 PROCEDURE — 99233 SBSQ HOSP IP/OBS HIGH 50: CPT

## 2023-01-13 PROCEDURE — 77280 THER RAD SIMULAJ FIELD SMPL: CPT | Mod: 26

## 2023-01-13 PROCEDURE — 99232 SBSQ HOSP IP/OBS MODERATE 35: CPT

## 2023-01-13 RX ORDER — OXYCODONE HYDROCHLORIDE 5 MG/1
15 TABLET ORAL ONCE
Refills: 0 | Status: DISCONTINUED | OUTPATIENT
Start: 2023-01-13 | End: 2023-01-13

## 2023-01-13 RX ADMIN — ONDANSETRON 4 MILLIGRAM(S): 8 TABLET, FILM COATED ORAL at 09:30

## 2023-01-13 RX ADMIN — CHLORHEXIDINE GLUCONATE 1 APPLICATION(S): 213 SOLUTION TOPICAL at 13:32

## 2023-01-13 RX ADMIN — OXYCODONE HYDROCHLORIDE 15 MILLIGRAM(S): 5 TABLET ORAL at 15:50

## 2023-01-13 RX ADMIN — OXYCODONE HYDROCHLORIDE 10 MILLIGRAM(S): 5 TABLET ORAL at 01:30

## 2023-01-13 RX ADMIN — GABAPENTIN 300 MILLIGRAM(S): 400 CAPSULE ORAL at 13:33

## 2023-01-13 RX ADMIN — OXYCODONE HYDROCHLORIDE 15 MILLIGRAM(S): 5 TABLET ORAL at 19:19

## 2023-01-13 RX ADMIN — GABAPENTIN 300 MILLIGRAM(S): 400 CAPSULE ORAL at 06:26

## 2023-01-13 RX ADMIN — OXYCODONE HYDROCHLORIDE 15 MILLIGRAM(S): 5 TABLET ORAL at 14:49

## 2023-01-13 RX ADMIN — Medication 2: at 13:32

## 2023-01-13 RX ADMIN — ONDANSETRON 4 MILLIGRAM(S): 8 TABLET, FILM COATED ORAL at 22:26

## 2023-01-13 RX ADMIN — OXYCODONE HYDROCHLORIDE 15 MILLIGRAM(S): 5 TABLET ORAL at 18:15

## 2023-01-13 RX ADMIN — OXYCODONE HYDROCHLORIDE 15 MILLIGRAM(S): 5 TABLET ORAL at 09:38

## 2023-01-13 RX ADMIN — OXYCODONE HYDROCHLORIDE 10 MILLIGRAM(S): 5 TABLET ORAL at 02:21

## 2023-01-13 RX ADMIN — GABAPENTIN 300 MILLIGRAM(S): 400 CAPSULE ORAL at 22:26

## 2023-01-13 RX ADMIN — MONTELUKAST 10 MILLIGRAM(S): 4 TABLET, CHEWABLE ORAL at 22:26

## 2023-01-13 RX ADMIN — METHADONE HYDROCHLORIDE 2.5 MILLIGRAM(S): 40 TABLET ORAL at 22:26

## 2023-01-13 RX ADMIN — PANTOPRAZOLE SODIUM 40 MILLIGRAM(S): 20 TABLET, DELAYED RELEASE ORAL at 06:26

## 2023-01-13 RX ADMIN — OXYCODONE HYDROCHLORIDE 15 MILLIGRAM(S): 5 TABLET ORAL at 22:25

## 2023-01-13 RX ADMIN — METHADONE HYDROCHLORIDE 2.5 MILLIGRAM(S): 40 TABLET ORAL at 13:47

## 2023-01-13 RX ADMIN — Medication 1 TABLET(S): at 13:32

## 2023-01-13 RX ADMIN — ONDANSETRON 4 MILLIGRAM(S): 8 TABLET, FILM COATED ORAL at 14:50

## 2023-01-13 RX ADMIN — OXYCODONE HYDROCHLORIDE 15 MILLIGRAM(S): 5 TABLET ORAL at 10:38

## 2023-01-13 RX ADMIN — POLYETHYLENE GLYCOL 3350 17 GRAM(S): 17 POWDER, FOR SOLUTION ORAL at 06:27

## 2023-01-13 RX ADMIN — METHADONE HYDROCHLORIDE 2.5 MILLIGRAM(S): 40 TABLET ORAL at 06:26

## 2023-01-13 RX ADMIN — Medication 400 UNIT(S): at 13:32

## 2023-01-13 RX ADMIN — OXYCODONE HYDROCHLORIDE 15 MILLIGRAM(S): 5 TABLET ORAL at 23:25

## 2023-01-13 NOTE — PROGRESS NOTE ADULT - NS ATTEST RISK PROBLEM GEN_ALL_CORE FT
In the setting of opiates administration including methadone , clinical monitoring required for side effects such as respiratory depression, constipation and opioid induced neurotoxicity.

## 2023-01-13 NOTE — CHART NOTE - NSCHARTNOTEFT_GEN_A_CORE
Source: Patient [ ]    Family [ ]     other [x ]RN, Chart review.     Current Diet : Diet, Regular:   Halal  No Beef  No Pork  Supplement Feeding Modality:  Oral  Ensure Max Cans or Servings Per Day:  1       Frequency:  Three Times a day (01-09-23 @ 14:09) [Active]    PO intake: 50-75% [x]   Height (cm): 152.4 (09 Jan 2023 20:35)  Weight (kg): 41.7 (11 Jan 2023 06:06)  BMI (kg/m2): 18 (11 Jan 2023 06:06)    Nutrition Note:   Patient is a 57y old Female who presents with a chief complaint of Difficulty ambulating, per chart.   RN by bedside provided information during visit. RN reports 50-75% of meal consumption. No reports of any difficulty chewing or swallowing current diet consistency, no reports of any nausea, vomiting, diarrhea, constipation during visit. Last bowel movement 1/12/2023, per flow sheet. As per tray observation during visit, patient is consuming ~50-75% of Ensure Max supplement. Current weight: 41.7kg (1/11/2023). Compared to weight history: 43.7kg (12/27/2022, per last RD note dated 1/9/2023). Noted with weight loss of 2kg/4.5% x 2 weeks. On Ensure Max supplement 3x/day(450kcal, 90gm protein) to prevent further weight loss. Will continue to monitor weight trend. Patient is on cholecalciferol, multivitamin for micronutrient support.     __________________ Pertinent Medications__________________   MEDICATIONS  (STANDING):  bisacodyl 5 milliGRAM(s) Oral at bedtime  chlorhexidine 2% Cloths 1 Application(s) Topical daily  cholecalciferol 400 Unit(s) Oral daily  dextrose 5%. 1000 milliLiter(s) (50 mL/Hr) IV Continuous <Continuous>  dextrose 5%. 1000 milliLiter(s) (100 mL/Hr) IV Continuous <Continuous>  dextrose 50% Injectable 25 Gram(s) IV Push once  dextrose 50% Injectable 12.5 Gram(s) IV Push once  dextrose 50% Injectable 25 Gram(s) IV Push once  enoxaparin Injectable 40 milliGRAM(s) SubCutaneous every 24 hours  gabapentin 300 milliGRAM(s) Oral three times a day  glucagon  Injectable 1 milliGRAM(s) IntraMuscular once  influenza   Vaccine 0.5 milliLiter(s) IntraMuscular once  insulin lispro (ADMELOG) corrective regimen sliding scale   SubCutaneous three times a day before meals  insulin lispro (ADMELOG) corrective regimen sliding scale   SubCutaneous at bedtime  melatonin 3 milliGRAM(s) Oral at bedtime  methadone    Tablet 2.5 milliGRAM(s) Oral every 8 hours  montelukast 10 milliGRAM(s) Oral at bedtime  multivitamin 1 Tablet(s) Oral daily  ondansetron   Disintegrating Tablet 4 milliGRAM(s) Oral every 6 hours  pantoprazole    Tablet 40 milliGRAM(s) Oral before breakfast  polyethylene glycol 3350 17 Gram(s) Oral two times a day  senna 2 Tablet(s) Oral at bedtime    MEDICATIONS  (PRN):  acetaminophen     Tablet .. 650 milliGRAM(s) Oral every 6 hours PRN Temp greater or equal to 38C (100.4F), Mild Pain (1 - 3)  dextrose Oral Gel 15 Gram(s) Oral once PRN Blood Glucose LESS THAN 70 milliGRAM(s)/deciliter  oxyCODONE    IR 15 milliGRAM(s) Oral every 4 hours PRN Severe Pain (7 - 10)  oxyCODONE    IR 10 milliGRAM(s) Oral every 4 hours PRN Moderate Pain (4 - 6)  prochlorperazine   Injectable 5 milliGRAM(s) IV Push every 8 hours PRN refractory nausea      __________________ Pertinent Labs__________________   01-13 Na134 mmol/L<L> Glu 145 mg/dL<H> K+ 4.3 mmol/L Cr  0.57 mg/dL BUN 5 mg/dL<L> 01-13 Phos 4.3 mg/dL      Edema: As per RN flow sheet, no edema noted at this time.   Skin: As per flow sheet, no pressure injury noted at this time.     Estimated Needs:   [x] no change since previous assessment    Previous Nutrition Diagnosis:   [x ] Malnutrition [x ]Increased nutrient needs  Nutrition Diagnosis is [x ] ongoing      New Nutrition Diagnosis: [x ] not applicable    Interventions:   Recommend  [x ] Continue with liberalized- regular, halal, no beef, no pork diet, to optimize po intake.   [x ] Continue to provide Ensure Max 11oz 3x/day(450kcal, 90gm protein) for nutrient support.   [x ] Continue to provide cholecalciferol, multivitamin for micronutrient support.   [x ] Encourage PO intake and honor food preferences as able.     Monitoring and Evaluation:   [x ] PO intake [x ] Tolerance to diet prescription [x ] weights [ x] follow up per protocol  [ x] other: bowel movement, skin integrity, labs.

## 2023-01-13 NOTE — PROGRESS NOTE ADULT - PROBLEM SELECTOR PLAN 1
[facility] Provided education on balanced nutrition during chemo treatments. Patient instructed to report any side affects to ordering provider. Patient tolerated well. Any issues or concerns during appointment: none. Patient aware of next infusion appointment on 10/28 at 11:30 am.  Discharged ambulatory to home. - Patient with pancreatic CA (mets to L spine, L femur) last chemo on 12/27  - NM Bone scan - Abnormal foci in the left femoral neck and lumbar spine highly suspicious for osseous metastases.  Osseous lesion in the left femoral neck places the patient at risk for pathologic fracture.  - Oncology recommendations appreciated.

## 2023-01-13 NOTE — PROGRESS NOTE ADULT - PROBLEM SELECTOR PLAN 5
Symptom management recommendations discussed with primary team   Thank you for allowing us to participate in your patient's care. Please page [phone] for any questions/concerns.

## 2023-01-13 NOTE — PROGRESS NOTE ADULT - SUBJECTIVE AND OBJECTIVE BOX
INTERVAL HPI/OVERNIGHT EVENTS:  Patient seen at bedside.  Patient with continued symptoms of left leg pain  Expresses that pain meds works sometimes  Patient feels like she hasnt had much improvement since  she has been admitted    [facility]  used  Language: Occitan   ID #: [id number]      VITAL SIGNS:  T(F): 98.3 (01-13-23 @ 06:31)  HR: 72 (01-13-23 @ 06:31)  BP: 121/71 (01-13-23 @ 06:31)  RR: 18 (01-13-23 @ 06:31)  SpO2: 99% (01-13-23 @ 06:31)  Wt(kg): --    PHYSICAL EXAM:    Constitutional: cachectic elderly F; sitting comfortably in bed  Head: NC/AT  Eyes: PERRL, EOMI, anicteric sclera  ENT: no nasal discharge; MMM  Neck: supple; no JVD  Respiratory: CTA B/L; no W/R/R; on RA without respiratory distress  Cardiac: +S1/S2; RRR; no M/R/G  Gastrointestinal: soft, scaphoid abd; NT/ND; no rebound or guarding; +BSx4  Extremities: WWP, no clubbing or cyanosis; no peripheral edema; peripheral mm wasting  Musculoskeletal: moves all extremities spontaneously      Vascular: 2+ radial, DP/PT pulses B/L  Dermatologic: skin warm, dry and intact; no rashes, wounds, or scars  Neurologic: CNII-XII grossly intact; no focal deficits  Psychiatric: affect and characteristics of appearance, verbalizations, behaviors are appropriate  MEDICATIONS  (STANDING):  bisacodyl 5 milliGRAM(s) Oral at bedtime  chlorhexidine 2% Cloths 1 Application(s) Topical daily  cholecalciferol 400 Unit(s) Oral daily  dextrose 5%. 1000 milliLiter(s) (50 mL/Hr) IV Continuous <Continuous>  dextrose 5%. 1000 milliLiter(s) (100 mL/Hr) IV Continuous <Continuous>  dextrose 50% Injectable 25 Gram(s) IV Push once  dextrose 50% Injectable 12.5 Gram(s) IV Push once  dextrose 50% Injectable 25 Gram(s) IV Push once  enoxaparin Injectable 40 milliGRAM(s) SubCutaneous every 24 hours  gabapentin 300 milliGRAM(s) Oral three times a day  glucagon  Injectable 1 milliGRAM(s) IntraMuscular once  influenza   Vaccine 0.5 milliLiter(s) IntraMuscular once  insulin lispro (ADMELOG) corrective regimen sliding scale   SubCutaneous three times a day before meals  insulin lispro (ADMELOG) corrective regimen sliding scale   SubCutaneous at bedtime  melatonin 3 milliGRAM(s) Oral at bedtime  methadone    Tablet 2.5 milliGRAM(s) Oral every 8 hours  montelukast 10 milliGRAM(s) Oral at bedtime  multivitamin 1 Tablet(s) Oral daily  ondansetron   Disintegrating Tablet 4 milliGRAM(s) Oral every 6 hours  pantoprazole    Tablet 40 milliGRAM(s) Oral before breakfast  polyethylene glycol 3350 17 Gram(s) Oral two times a day  senna 2 Tablet(s) Oral at bedtime    MEDICATIONS  (PRN):  acetaminophen     Tablet .. 650 milliGRAM(s) Oral every 6 hours PRN Temp greater or equal to 38C (100.4F), Mild Pain (1 - 3)  dextrose Oral Gel 15 Gram(s) Oral once PRN Blood Glucose LESS THAN 70 milliGRAM(s)/deciliter  oxyCODONE    IR 10 milliGRAM(s) Oral every 4 hours PRN Moderate Pain (4 - 6)  oxyCODONE    IR 15 milliGRAM(s) Oral every 4 hours PRN Severe Pain (7 - 10)  prochlorperazine   Injectable 5 milliGRAM(s) IV Push every 8 hours PRN refractory nausea      Allergies    No Known Allergies    Intolerances        LABS:                        9.2    4.68  )-----------( 168      ( 13 Jan 2023 05:23 )             28.7     01-13    134<L>  |  101  |  5<L>  ----------------------------<  145<H>  4.3   |  26  |  0.57    Ca    9.6      13 Jan 2023 05:23  Phos  4.3     01-13  Mg     1.80     01-13            RADIOLOGY & ADDITIONAL TESTS:  Studies reviewed.   INTERVAL HPI/OVERNIGHT EVENTS:  Patient seen at bedside.  Patient with continued symptoms of left leg pain  Expresses that pain meds works sometimes  Patient feels like she hasnt had much improvement since  she has been admitted    [facility]  used  Language: Bulgarian   ID #: [id number]      VITAL SIGNS:  T(F): 98.3 (01-13-23 @ 06:31)  HR: 72 (01-13-23 @ 06:31)  BP: 121/71 (01-13-23 @ 06:31)  RR: 18 (01-13-23 @ 06:31)  SpO2: 99% (01-13-23 @ 06:31)  Wt(kg): --    PHYSICAL EXAM:    Constitutional: cachectic elderly F; sitting comfortably in bed  Head: NC/AT  Eyes: PERRL, EOMI, anicteric sclera  ENT: no nasal discharge; MMM  Neck: supple; no JVD  Respiratory: CTA B/L; no W/R/R; on RA without respiratory distress  Cardiac: +S1/S2; RRR; no M/R/G  Gastrointestinal: soft, scaphoid abd; NT/ND; no rebound or guarding; +BSx4  Extremities: WWP, no clubbing or cyanosis; no peripheral edema; peripheral mm wasting  Musculoskeletal: moves all extremities spontaneously      Vascular: 2+ radial, DP/PT pulses B/L    Dermatologic: skin warm, dry and intact; no rashes, wounds, or scars  Neurologic: CNII-XII grossly intact; no focal deficits  Psychiatric: affect and characteristics of appearance, verbalizations, behaviors are appropriate  MEDICATIONS  (STANDING):  bisacodyl 5 milliGRAM(s) Oral at bedtime  chlorhexidine 2% Cloths 1 Application(s) Topical daily  cholecalciferol 400 Unit(s) Oral daily  dextrose 5%. 1000 milliLiter(s) (50 mL/Hr) IV Continuous <Continuous>  dextrose 5%. 1000 milliLiter(s) (100 mL/Hr) IV Continuous <Continuous>  dextrose 50% Injectable 25 Gram(s) IV Push once  dextrose 50% Injectable 12.5 Gram(s) IV Push once  dextrose 50% Injectable 25 Gram(s) IV Push once  enoxaparin Injectable 40 milliGRAM(s) SubCutaneous every 24 hours  gabapentin 300 milliGRAM(s) Oral three times a day  glucagon  Injectable 1 milliGRAM(s) IntraMuscular once  influenza   Vaccine 0.5 milliLiter(s) IntraMuscular once  insulin lispro (ADMELOG) corrective regimen sliding scale   SubCutaneous three times a day before meals  insulin lispro (ADMELOG) corrective regimen sliding scale   SubCutaneous at bedtime  melatonin 3 milliGRAM(s) Oral at bedtime  methadone    Tablet 2.5 milliGRAM(s) Oral every 8 hours  montelukast 10 milliGRAM(s) Oral at bedtime  multivitamin 1 Tablet(s) Oral daily  ondansetron   Disintegrating Tablet 4 milliGRAM(s) Oral every 6 hours  pantoprazole    Tablet 40 milliGRAM(s) Oral before breakfast  polyethylene glycol 3350 17 Gram(s) Oral two times a day  senna 2 Tablet(s) Oral at bedtime    MEDICATIONS  (PRN):  acetaminophen     Tablet .. 650 milliGRAM(s) Oral every 6 hours PRN Temp greater or equal to 38C (100.4F), Mild Pain (1 - 3)  dextrose Oral Gel 15 Gram(s) Oral once PRN Blood Glucose LESS THAN 70 milliGRAM(s)/deciliter  oxyCODONE    IR 10 milliGRAM(s) Oral every 4 hours PRN Moderate Pain (4 - 6)  oxyCODONE    IR 15 milliGRAM(s) Oral every 4 hours PRN Severe Pain (7 - 10)  prochlorperazine   Injectable 5 milliGRAM(s) IV Push every 8 hours PRN refractory nausea      Allergies    No Known Allergies    Intolerances        LABS:                        9.2    4.68  )-----------( 168      ( 13 Jan 2023 05:23 )             28.7     01-13    134<L>  |  101  |  5<L>  ----------------------------<  145<H>  4.3   |  26  |  0.57    Ca    9.6      13 Jan 2023 05:23  Phos  4.3     01-13  Mg     1.80     01-13            RADIOLOGY & ADDITIONAL TESTS:  Studies reviewed.

## 2023-01-13 NOTE — CHART NOTE - NSCHARTNOTEFT_GEN_A_CORE
Palliative Care Biopsychosocial Assessment:     Discussed patient in morning Interdisciplinary rounds. Palliative care attending Dr. Anand steward for symptom management. Pt is planned for 5 RT fractions and then follow up at [email or web address]   Patient identified for need of assessment by Palliative Care . SW met with pt with [facility]  #[id number]    Patient Coping Status:     [   ]   coping well        [ X  ]    coping with  some difficulty       [   ]   difficulty coping     [   ]   other      Pt does not wish to engage and defers to her daughter. She was tearful when discussing her cancer history but was not open to further exploration of her feelings.                                                Patient Emotional Status:   [   ]   anxious         [   ]   depressed           [   ]  overwhelmed          [   ]   angry         [ X  ]accepting       [   ]   not accepting           [   ]   other   Pt did state that she is ready to take more cancer directed tx but is not sure if her body is able to. Upon further exploration pt states that she understands that she would pass away if she was not able to tolerate more DMT.    Patient Mental Status:   [ X  ]   alert              [ X  ]   oriented         [    ]   confused         [   ] lethargic         [   ]   non-responsive   [   ]   other     Advance Directives:   [   ]    Health Care Surrogate: Name:                             [ X  ]    Health Care Proxy: Name: Cintia Lagunas: [phone]  [   ]    MOLST  [   ]    Living Will  [   ]    DNR  [   ]    DNI    Social Support:  Evaluated patients social support system  Patient defers to her daughter, Cintia, for medical decision making and for her care. She states she lives with her spouse and son. She states her dtr is staying with her to provide care.     Patient Needs:   [   ]   Supportive Counseling                  [   ]   Family Meeting            [   ]    Education                           [ X  ]   Advance Care Planning      Will discuss advance care planning regarding pt's preference for code status. Broached with pt however she continues to defer to her dtr stating "whatever my daughter wants."     Caregiver needs:   [ X  ]   Supportive Counseling      [   ]   Family Conference      [   ]   Education    [   ]   Other   Dtr has a good understanding regarding pt's cancer trajectory. She is aware pt has metastatic dx and goal of tx is palliative in nature. She is hopeful that pt can continue DMT    Referral:    [   ]   Community Resources         [ X  ]   Cancer Support Group     [   ]    Hospice     [   ]   Bereavement support     [   ]   Pastoral Care      [   ]  Live On [place]       [   ]  Child Life Services   [   ]   Caregiver Support Group       [  ] Other   [   ]   No needs identified no referrals made    Collaborated with Care Coordination Team (Case Management and ) on the unit, assisted in transitions of care options. Care Coordination Team will continue to follow. Pt will remain in hospital for duration of her radiation tx. Will f/u at [facility] upon d/c for further tx.     Palliative Care  will continue to remain available as needed for continued psychosocial and emotional support.     Nay Baum LMSW  Palliative Care   Geriatrics and Palliative Care Division at [facility]  Pager #[phone] Ufbbyicyn [phone] Palliative Care Biopsychosocial Assessment:     Discussed patient in morning Interdisciplinary rounds. Palliative care attending Dr. Anand steward for symptom management. Pt is planned for 5 RT fractions and then follow up at [facility].   Patient identified for need of assessment by Palliative Care . SW met with pt with [place]  #[phone]    Patient Coping Status:     [   ]   coping well        [ X  ]    coping with  some difficulty       [   ]   difficulty coping     [   ]   other      Pt does not wish to engage and defers to her daughter. She was tearful when discussing her cancer history but was not open to further exploration of her feelings.                                                Patient Emotional Status:   [   ]   anxious         [   ]   depressed           [   ]  overwhelmed          [   ]   angry         [ X  ]accepting       [   ]   not accepting           [   ]   other   Pt did state that she is ready to take more cancer directed tx but is not sure if her body is able to. Upon further exploration pt states that she understands that she would pass away if she was not able to tolerate more DMT.    Patient Mental Status:   [ X  ]   alert              [ X  ]   oriented         [    ]   confused         [   ] lethargic         [   ]   non-responsive   [   ]   other     Advance Directives:   [   ]    Health Care Surrogate: Name:                             [ X  ]    Health Care Proxy: Name: Cintia Lagunas: [phone]  [   ]    MOLST  [   ]    Living Will  [   ]    DNR  [   ]    DNI    Social Support:  Evaluated patients social support system  Patient defers to her daughter, Cintia, for medical decision making and for her care. She states she lives with her spouse and son. She states her dtr is staying with her to provide care.     Patient Needs:   [   ]   Supportive Counseling                  [   ]   Family Meeting            [   ]    Education                           [ X  ]   Advance Care Planning      Will discuss advance care planning regarding pt's preference for code status. Broached with pt however she continues to defer to her dtr stating "whatever my daughter wants."     Caregiver needs:   [ X  ]   Supportive Counseling      [   ]   Family Conference      [   ]   Education    [   ]   Other   Dtr has a good understanding regarding pt's cancer trajectory. She is aware pt has metastatic dx and goal of tx is palliative in nature. She is hopeful that pt can continue DMT    Referral:    [   ]   Community Resources         [   ]   Cancer Support Group     [   ]    Hospice     [   ]   Bereavement support     [   ]   Pastoral Care      [   ]  [place]       [   ]  Child Life Services   [   ]   Caregiver Support Group       [  ] Other   [  X]   No needs identified no referrals made    Collaborated with Care Coordination Team (Case Management and ) on the unit, assisted in transitions of care options. Care Coordination Team will continue to follow. Pt will remain in hospital for duration of her radiation tx. Will f/u at [facility] upon d/c for further tx.     Palliative Care  will continue to remain available as needed for continued psychosocial and emotional support.     Nay Baum LMSW  Palliative Care   Geriatrics and Palliative Care Division at [facility]  Pager #[phone] [phone] [phone] Palliative Care Biopsychosocial Assessment:     Discussed patient in morning Interdisciplinary rounds. Palliative care attending Dr. Anand steward for symptom management. Pt is planned for 5 RT fractions and then follow up at [facility].   Patient identified for need of assessment by Palliative Care . SW met with pt with [place]  #[phone]    Patient Coping Status:     [   ]   coping well        [ X  ]    coping with  some difficulty       [   ]   difficulty coping     [   ]   other      Pt does not wish to engage and defers to her daughter. She was tearful when discussing her cancer history but was not open to further exploration of her feelings.                                                Patient Emotional Status:   [   ]   anxious         [   ]   depressed           [   ]  overwhelmed          [   ]   angry         [ X  ]accepting       [   ]   not accepting           [   ]   other   Pt did state that she is ready to take more cancer directed tx but is not sure if her body is able to. Upon further exploration pt states that she understands that she would pass away if she was not able to tolerate more DMT.    Patient Mental Status:   [ X  ]   alert              [ X  ]   oriented         [    ]   confused         [   ] lethargic         [   ]   non-responsive   [   ]   other     Advance Directives:   [   ]    Health Care Surrogate: Name:                             [ X  ]    Health Care Proxy: Name: Cintia Lagunas: [phone]  [   ]    MOLST  [   ]    Living Will  [   ]    DNR  [   ]    DNI    Social Support:  Evaluated patients social support system  Patient defers to her daughter, Cintia, for medical decision making and for her care. She states she lives with her spouse and son. She states her dtr is staying with her to provide care.     Patient Needs:   [   ]   Supportive Counseling                  [   ]   Family Meeting            [   ]    Education       [   ]   Advance Care Planning    [X] no needs identified      Caregiver needs:   [  ]   Supportive Counseling      [   ]   Family Conference      [   ]   Education    [ X  ]  no needs identified  Dtr has a good understanding regarding pt's oncology trajectory. She is aware pt has metastatic dx and goal of tx is palliative in nature. She is hopeful that pt can continue DMT.    Referral:    [   ]   Community Resources         [   ]   Cancer Support Group     [   ]    Hospice     [   ]   Bereavement support     [   ]   Pastoral Care      [   ]  [place]       [   ]  Child Life Services   [   ]   Caregiver Support Group       [  ] Other   [  X]   No needs identified no referrals made    Collaborated with Care Coordination Team (Case Management and ) on the unit, assisted in transitions of care options. Care Coordination Team will continue to follow. Pt will remain in hospital for duration of her radiation tx. Will f/u at [facility] upon d/c for further tx.     Palliative Care  will continue to remain available as needed for continued psychosocial and emotional support however no needs identifed at this time.    Nay Baum LMSW  Palliative Care   Geriatrics and Palliative Care Division at [facility]  Pager #[phone] [id number] [phone]

## 2023-01-13 NOTE — PROGRESS NOTE ADULT - PROBLEM SELECTOR PLAN 4
Patient wishes to continue to pursue DMT  Please see separate GOC note.  >16 minutes spent on goals of care

## 2023-01-13 NOTE — PROGRESS NOTE ADULT - PROBLEM SELECTOR PLAN 2
- MRI - Metastatic lesions within the left femoral neck and superior articular facet of L5. The lesion in the left femoral neck likely places the patient at increased risk for pathologic fracture. Small left hip effusion.  - Ortho and Neurosurgery recommendations appreciated  - Rad/onc recommendations appreciated - plan for 5 fractions of RT   - PO Methadone 2.5mg TID (QTC on 1/12 - 438)  - PO Gabapentin 300mg TID  - PO Oxy IR 10mg q4 PRN moderate pain (hold for hypotension, oversedation, respiratory depression)  - PO Oxy IR 15mg q4PRN severe pain (hold for hypotension, oversedation, respiratory depression)  - Encouraged patient to ask for prn doses if having pain.  - Bowel regimen while on opiates.  - Narcan PRN

## 2023-01-13 NOTE — PROGRESS NOTE ADULT - ASSESSMENT
56 yo F with metastatic pancreatic CA (mets to L spine, L femur). She was first diagnosed with pancreatic cancer in 2018 and then in 1/2019 underwent a Whipple for Stage III pancreatic cancer, T3N1 with 1/24 LN positive. She did well until 7/2022 when she was found to have recurrent disease and was started on chemo with Abraxane and Gemzar. She tolerated chemotherapy well overall. She is now admitted with LLE pain x 1mo with difficulty ambulating x1wk. CT shows no evidence of metastatic disease at this time. Bone scan and MRI show evidence of progression of cancer with metastatic lesions within the left femoral neck and superior articular facet of L5. The lesion in the left femoral neck likely places the patient at increased risk for pathologic fracture.    -As she is only 57 with a small tumor burden and good PS until the recent POD she is a reasonable candidate for surgery to be followed by RT and a new chemo regimen.  -As per Sx, no plans for acute intervention  -Rad Oncology, patient has been simulated and therefore should have RT initiated  -GOC: They are interested in continuing treatment and understand that as she has had POD on the current chemo she should be evaluated for a new systemic therapy following RT  -She is a good candidate for additional chemotherapy.  -No plans for inpatient chemotherapy  -Appreciate  Palliative Care consult for pain management  -Monitor CBC daily, pt counts may go down in the setting of recent chemo  -Patient to followup with her primary oncologist Dr. Katty Navarro ([facility]) upon discharge  -C/w Supportive care, pain control, Nutrition, PT, DVT ppx  -Oncology will continue to follow with you        Case discussed with Dr. Summer CASTLE  Oncology Physician Assistant  [facility]  Pager [phone] also available on Teams    If before 8am/after 5pm or on weekends please page On-call Oncology Fellow           58 yo F with metastatic pancreatic CA (mets to L spine, L femur). She was first diagnosed with pancreatic cancer in 2018 and then in 1/2019 underwent a Whipple for Stage III pancreatic cancer, T3N1 with 1/24 LN positive. She did well until 7/2022 when she was found to have recurrent disease and was started on chemo with Abraxane and Gemzar. She tolerated chemotherapy well overall. She is now admitted with LLE pain x 1mo with difficulty ambulating x1wk. CT shows no evidence of metastatic disease at this time. Bone scan and MRI show evidence of progression of cancer with metastatic lesions within the left femoral neck and superior articular facet of L5. The lesion in the left femoral neck likely places the patient at increased risk for pathologic fracture.    -As per Surgery, there is no plan for surgery at this time as it is thought that her pain will be relieved with radiation. If pain does not resolve or there is a fracture in the future then she can have surgery at a later date.  -Rad Oncology, patient has been simulated and therefore should have RT initiated as soon as possible as surgery is not planned at this time  -GOC: They are interested in continuing treatment and understand that as she has had POD on the current chemo she should be evaluated for a new systemic therapy following RT  -She is a good candidate for additional chemotherapy.  -No plans for inpatient chemotherapy  -Appreciate  Palliative Care consult for pain management  -Monitor CBC daily, pt counts may go down in the setting of recent chemo  -Patient to followup with her primary oncologist Dr. Katty Navarro ([facility]) upon discharge  -C/w Supportive care, pain control, Nutrition, PT, DVT ppx  -Oncology will continue to follow with you      Case discussed with Dr. Summer CASTLE  Oncology Physician Assistant  [facility]  Pager [phone] also available on Teams    If before 8am/after 5pm or on weekends please page On-call Oncology Fellow           58 yo F with metastatic pancreatic CA (mets to L spine, L femur). She was first diagnosed with pancreatic cancer in 2018 and then in 1/2019 underwent a Whipple for Stage III pancreatic cancer, T3N1 with 1/24 LN positive. She did well until 7/2022 when she was found to have recurrent disease and was started on chemo with Abraxane and Gemzar. She tolerated chemotherapy well overall. She is now admitted with LLE pain x 1mo with difficulty ambulating x1wk. CT shows no evidence of metastatic disease at this time. Bone scan and MRI show evidence of progression of cancer with metastatic lesions within the left femoral neck and superior articular facet of L5. The lesion in the left femoral neck likely places the patient at increased risk for pathologic fracture.    -As per Surgery, there is no plan for surgery at this time as it is thought that her pain will be relieved with radiation. If pain does not resolve or there is a fracture in the future then she can have surgery at a later date.  -Rad Oncology, patient has been simulated and therefore should have RT initiated as soon as possible as surgery is not planned at this time  -GOC: They are interested in continuing treatment and understand that as she has had POD on the current chemo she should be evaluated for a new systemic therapy following RT.   -She is a good candidate for additional chemotherapy.  -No plans for inpatient chemotherapy  -Appreciate  Palliative Care consult for pain management  -Monitor CBC daily, pt counts may go down in the setting of recent chemo  -Patient to followup with her primary oncologist Dr. Katty Navarro ([facility]) upon discharge  -C/w Supportive care, pain control, Nutrition, PT, DVT ppx  -Oncology will continue to follow with you      Case discussed with Dr. Summer CASTLE  Oncology Physician Assistant  [facility]  Pager [phone] also available on Teams    If before 8am/after 5pm or on weekends please page On-call Oncology Fellow           56 yo F with metastatic pancreatic CA (mets to L spine, L femur). She was first diagnosed with pancreatic cancer in 2018 and then in 1/2019 underwent a Whipple for Stage III pancreatic cancer, T3N1 with 1/24 LN positive. She did well until 7/2022 when she was found to have recurrent disease and was started on chemo with Abraxane and Gemzar. She tolerated chemotherapy well overall. She is now admitted with LLE pain x 1mo with difficulty ambulating x1wk. CT shows no evidence of metastatic disease at this time. Bone scan and MRI show evidence of progression of cancer with metastatic lesions within the left femoral neck and superior articular facet of L5. The lesion in the left femoral neck likely places the patient at increased risk for pathologic fracture.    -As per Surgery, there is no plan for surgery at this time as it is thought that her pain will be relieved with radiation. If pain does not resolve or there is a fracture in the future then she can have surgery at a later date.  -Rad Oncology, patient has been simulated and therefore should have RT initiated as soon as possible as surgery is not planned at this time  -GOC: They are interested in continuing treatment and understand that as she has had POD on the current chemo she should be evaluated for a new systemic therapy following RT.   -She is a good candidate for additional chemotherapy.  -No plans for inpatient chemotherapy  -Appreciate  Palliative Care consult for pain management  -Monitor CBC daily, pt counts may go down in the setting of recent chemo  -Patient to followup with her primary oncologist Dr. Katty Navarro ([facility]) upon discharge  -C/w Supportive care, pain control, Nutrition, PT, DVT ppx  -Oncology will continue to follow with you      Case discussed with Dr. Summer CASTLE  Oncology Physician Assistant  [facility]  Pager [phone] also available on Teams    If before 8am/after 5pm or on weekends please page On-call Oncology Fellow

## 2023-01-13 NOTE — PROGRESS NOTE ADULT - SUBJECTIVE AND OBJECTIVE BOX
[facility] Department of Hospital Medicine  Lorie Soto DO  Available on MS Teams  Pager: [phone]    Patient is a 57y old  Female who presents with a chief complaint of Difficulty ambulating (09 Jan 2023 15:07)    Subjective:  Pt seen and examined at bedside sleeping comfortably. Denies any acute complaints.     Vital Signs Last 24 Hrs  T(C): 36.6 (13 Jan 2023 14:19), Max: 37.2 (12 Jan 2023 21:34)  T(F): 97.9 (13 Jan 2023 14:19), Max: 99 (12 Jan 2023 21:34)  HR: 79 (13 Jan 2023 14:19) (72 - 88)  BP: 113/58 (13 Jan 2023 14:19) (104/62 - 121/71)  BP(mean): --  RR: 17 (13 Jan 2023 14:19) (17 - 18)  SpO2: 99% (13 Jan 2023 14:19) (98% - 99%)    Parameters below as of 13 Jan 2023 14:19  Patient On (Oxygen Delivery Method): room air    PHYSICAL EXAM:  Constitutional: cachectic elderly F; sleeping comfortably in bed  Head: NC/AT  Eyes: PERRL, EOMI, anicteric sclera  ENT: no nasal discharge; MMM  Neck: supple; no JVD  Respiratory: CTA B/L; no W/R/R; on RA without respiratory distress  Cardiac: +S1/S2; RRR; no M/R/G  Gastrointestinal: soft, scaphoid abd; NT/ND; no rebound or guarding; +BSx4  Extremities: WWP, no clubbing or cyanosis; no peripheral edema; peripheral mm wasting  Musculoskeletal: moves all extremities spontaneously  Vascular: 2+ radial, DP/PT pulses B/L  Dermatologic: skin warm, dry and intact; no rashes, wounds, or scars  Neurologic: CNII-XII grossly intact; no focal deficits  Psychiatric: affect and characteristics of appearance, verbalizations, behaviors are appropriate    MEDICATIONS  (STANDING):  bisacodyl 5 milliGRAM(s) Oral at bedtime  chlorhexidine 2% Cloths 1 Application(s) Topical daily  cholecalciferol 400 Unit(s) Oral daily  dextrose 5%. 1000 milliLiter(s) (50 mL/Hr) IV Continuous <Continuous>  dextrose 5%. 1000 milliLiter(s) (100 mL/Hr) IV Continuous <Continuous>  dextrose 50% Injectable 25 Gram(s) IV Push once  dextrose 50% Injectable 12.5 Gram(s) IV Push once  dextrose 50% Injectable 25 Gram(s) IV Push once  enoxaparin Injectable 40 milliGRAM(s) SubCutaneous every 24 hours  gabapentin 300 milliGRAM(s) Oral three times a day  glucagon  Injectable 1 milliGRAM(s) IntraMuscular once  influenza   Vaccine 0.5 milliLiter(s) IntraMuscular once  insulin lispro (ADMELOG) corrective regimen sliding scale   SubCutaneous three times a day before meals  insulin lispro (ADMELOG) corrective regimen sliding scale   SubCutaneous at bedtime  melatonin 3 milliGRAM(s) Oral at bedtime  methadone    Tablet 2.5 milliGRAM(s) Oral every 8 hours  montelukast 10 milliGRAM(s) Oral at bedtime  multivitamin 1 Tablet(s) Oral daily  ondansetron   Disintegrating Tablet 4 milliGRAM(s) Oral every 6 hours  pantoprazole    Tablet 40 milliGRAM(s) Oral before breakfast  polyethylene glycol 3350 17 Gram(s) Oral two times a day  senna 2 Tablet(s) Oral at bedtime    MEDICATIONS  (PRN):  acetaminophen     Tablet .. 650 milliGRAM(s) Oral every 6 hours PRN Temp greater or equal to 38C (100.4F), Mild Pain (1 - 3)  dextrose Oral Gel 15 Gram(s) Oral once PRN Blood Glucose LESS THAN 70 milliGRAM(s)/deciliter  oxyCODONE    IR 10 milliGRAM(s) Oral every 4 hours PRN Moderate Pain (4 - 6)  oxyCODONE    IR 15 milliGRAM(s) Oral every 4 hours PRN Severe Pain (7 - 10)  prochlorperazine   Injectable 5 milliGRAM(s) IV Push every 8 hours PRN refractory nausea    LABS:                        9.2    4.68  )-----------( 168      ( 13 Jan 2023 05:23 )             28.7     01-13    134<L>  |  101  |  5<L>  ----------------------------<  145<H>  4.3   |  26  |  0.57    Ca    9.6      13 Jan 2023 05:23  Phos  4.3     01-13  Mg     1.80     01-13          CAPILLARY BLOOD GLUCOSE      POCT Blood Glucose.: 191 mg/dL (13 Jan 2023 12:46)      RADIOLOGY & ADDITIONAL TESTS: Reviewed.

## 2023-01-13 NOTE — PROGRESS NOTE ADULT - PROBLEM SELECTOR PLAN 2
- pt with hx pancreatic CA f/w Dr. Katty Navarro @ [facility]; known mets to L spine and L femur, on chemo (ambraxane and gemzar) last dose 12/27 and started radiation to L spine early December x1 session   - appreciate rad on, nsx and med onc recs   - plan for inpt RT as above

## 2023-01-13 NOTE — PROGRESS NOTE ADULT - PROBLEM SELECTOR PLAN 1
p/w 1mo progressive LLE pain and difficulty ambulating x1wk; likely in setting of known L femur metastatic disease; previously on 5mg oxycodone and increased to 10mg; f/w pain management and recently started on methadone 5mg in the last week without relief  - XR L knee/hip/pelvis/femur neg acute fx  - pain control per palliative: 2.5 mg methadone, oxycodone 5mg q8 standing (pt can refuse), 10 mg q 4 for mod pain and 15 mg IR q 4 for severe pain.  - bowel regimen while on opioids- last BM 1/8   - MRI 1/5 completed, no surgical intervention. Plan for RT 5 sessions starting ?1/13

## 2023-01-13 NOTE — PROGRESS NOTE ADULT - CONVERSATION DETAILS
Daughter summarized oncological history from time of diagnosis. She verbalized understanding that patient with cancer of metastatic nature; she states she is aware that treatments are likely palliative in nature (rather than curative in nature). She shares that patient likely will have to continue DMT "for life". She states based on their discussion with outpatient oncology team patient has been doing well on current treatment, but does acknowledge that it is worrisome that new mets were found on outpatient PET scan recently.   Daughter hopeful at this time that patient will benefit from RT for better pain control as pain has been limiting her ability to ambulate. Daughter shares she and patient are remaining hopeful about her condition and wish to continue with DMT  Emotional support provided.
Patient aware that she has metastatic cancer. She states she has been undergoing treatment with Dr. Navarro prior to admission. She states while she is ready to take more cancer directed tx, she is not sure if her body is able to. Upon further exploration pt states that she understands that she would pass away if she was not able to tolerate more DMT. Patient was tearful.   At this time, patient states she is hoping to continue with PT and DMT upon discharge.   Attempted to broach advanced care planning preferences with patient, but she defers to her daughter.   Emotional support provided.

## 2023-01-13 NOTE — PROGRESS NOTE ADULT - SUBJECTIVE AND OBJECTIVE BOX
SUBJECTIVE AND OBJECTIVE:  Patient seen and examined at bedside. Patient being followed up for pain.   [facility] : [id number]. Patient states pain still present especially with movement. Educated/ counseled patient about pain regimen. Nausea improving per patient     INTERVAL HPI/OVERNIGHT EVENTS:  Received 1 doses of the ATC Oxy IR 10mg in past 24 hours.     Allergies    No Known Allergies    Intolerances    MEDICATIONS  (STANDING):  bisacodyl 5 milliGRAM(s) Oral at bedtime  chlorhexidine 2% Cloths 1 Application(s) Topical daily  cholecalciferol 400 Unit(s) Oral daily  dextrose 5%. 1000 milliLiter(s) (50 mL/Hr) IV Continuous <Continuous>  dextrose 5%. 1000 milliLiter(s) (100 mL/Hr) IV Continuous <Continuous>  dextrose 50% Injectable 25 Gram(s) IV Push once  dextrose 50% Injectable 12.5 Gram(s) IV Push once  dextrose 50% Injectable 25 Gram(s) IV Push once  enoxaparin Injectable 40 milliGRAM(s) SubCutaneous every 24 hours  gabapentin 300 milliGRAM(s) Oral three times a day  glucagon  Injectable 1 milliGRAM(s) IntraMuscular once  influenza   Vaccine 0.5 milliLiter(s) IntraMuscular once  insulin lispro (ADMELOG) corrective regimen sliding scale   SubCutaneous three times a day before meals  insulin lispro (ADMELOG) corrective regimen sliding scale   SubCutaneous at bedtime  melatonin 3 milliGRAM(s) Oral at bedtime  methadone    Tablet 2.5 milliGRAM(s) Oral every 8 hours  montelukast 10 milliGRAM(s) Oral at bedtime  multivitamin 1 Tablet(s) Oral daily  ondansetron   Disintegrating Tablet 4 milliGRAM(s) Oral every 6 hours  pantoprazole    Tablet 40 milliGRAM(s) Oral before breakfast  polyethylene glycol 3350 17 Gram(s) Oral two times a day  senna 2 Tablet(s) Oral at bedtime    MEDICATIONS  (PRN):  acetaminophen     Tablet .. 650 milliGRAM(s) Oral every 6 hours PRN Temp greater or equal to 38C (100.4F), Mild Pain (1 - 3)  dextrose Oral Gel 15 Gram(s) Oral once PRN Blood Glucose LESS THAN 70 milliGRAM(s)/deciliter  oxyCODONE    IR 10 milliGRAM(s) Oral every 4 hours PRN Moderate Pain (4 - 6)  oxyCODONE    IR 15 milliGRAM(s) Oral every 4 hours PRN Severe Pain (7 - 10)  prochlorperazine   Injectable 5 milliGRAM(s) IV Push every 8 hours PRN refractory nausea      ITEMS UNCHECKED ARE NOT PRESENT    PRESENT SYMPTOMS: [ ]Unable to self-report due to altered mental status- see [ ] CPOT [ ] PAINADS [ ] RDOS  Source if other than patient:  [ ]Family   [ ]Team     Pain: [ x]yes [ ]no  QOL impact - severe  Location - left thigh                     Aggravating factors - movement   Quality - tingling   Radiation - left lower extremity and lower back  Timing- constant with intermittent worsening episodes   Severity (0-10 scale): 10  Minimal acceptable level / Pain Goal (0-10 scale):  7-8    Dyspnea:                           [ ]Mild [ ]Moderate [ ]Severe  Anxiety:                             [ ]Mild [ ]Moderate [ ]Severe  Agitation:                          [ ]Mild [ ]Moderate [ ]Severe  Fatigue:                             [ ]Mild [ ]Moderate [ ]Severe  Nausea:                             [x ]Mild [ ]Moderate [ ]Severe  Loss of appetite:              [x ]Mild [ ]Moderate [ ]Severe  Constipation:                   [ ]Mild [ ]Moderate [ ]Severe  Diarrhea:                          [ ]Mild [ ]Moderate [ ]Severe    CPOT:    https://www.[facility].org/getattachment/[email or web address]/Critical-Care-Pain-Observation-Tool-(CPOT)    PCSSQ[Palliative Care Spiritual Screening Question]   Severity (0-10):  Score of 4 or > indicate consideration of Chaplaincy referral.  Chaplaincy Referral: [ ] yes [ ] refused [ ] following [x ] deferred    Caregiver [place]? : [ ] yes [ ] no [ ] Declined [x ] Deferred              Social work referral [ ] Patient & Family Centered Care Referral [ ]     Anticipatory Grief present?:  [ ] yes [ x] no  [ ] Deferred                  Social work referral [ ] Chaplaincy Referral[ ]    Other Symptoms:  [ x]All other review of systems negative     PHYSICAL EXAM:  Vital Signs Last 24 Hrs  T(C): 36.8 (13 Jan 2023 06:31), Max: 37.2 (12 Jan 2023 21:34)  T(F): 98.3 (13 Jan 2023 06:31), Max: 99 (12 Jan 2023 21:34)  HR: 72 (13 Jan 2023 06:31) (72 - 88)  BP: 121/71 (13 Jan 2023 06:31) (104/61 - 121/71)  BP(mean): --  RR: 18 (13 Jan 2023 06:31) (17 - 18)  SpO2: 99% (13 Jan 2023 06:31) (98% - 100%)    Parameters below as of 13 Jan 2023 06:31  Patient On (Oxygen Delivery Method): room air    GENERAL:  [x ]Alert  [x ]Oriented x 3  [ ]Lethargic  [ ]Cachexia  [ ]Unarousable  [x ]Verbal  [ ]Non-Verbal    Behavioral:   [ ] Anxiety  [ ] Delirium [ ] Agitation [ x] Calm  [ ] Other  HEENT:  [ x]Normal  [ ] Temporal Wasting  [ ]Dry mouth   [ ]ET Tube/Trach  [ ]Oral lesions  [ ] Mucositis  PULMONARY:   [ x]Clear [ ]Tachypnea  [ ]Audible excessive secretions   [ ]Rhonchi        [ ]Right [ ]Left [ ]Bilateral  [ ]Crackles        [ ]Right [ ]Left [ ]Bilateral  [ ]Wheezing     [ ]Right [ ]Left [ ]Bilateral  [ ]Diminished breath sounds [ ]right [ ]left [ ]bilateral  CARDIOVASCULAR:    [ x]Regular [ ]Irregular [ ]Tachy  [ ]Dmitry [ ]Murmur [ ]Other  GASTROINTESTINAL:  [ x]Soft  [ ]Distended   [ ]+BS  [x ]Non tender [ ]Tender  [ ]PEG [ ]OGT/ NGT  Last BM: 1/12  GENITOURINARY:   [x ]Normal [ ] Incontinent   [ ]Oliguria/Anuria   [ ]Swanson  MUSCULOSKELETAL:   [x ]Normal , except left lower extremity movement limited due to pain  [ ]Weakness  [ ]Bed/Wheelchair bound [ ]Edema  [  ] amputation  [  ] contraction  NEUROLOGIC:   [ x]No focal deficits  [ ]Cognitive impairment  [ ]Dysphagia [ ]Dysarthria [ ]Paresis [ ]Other   SKIN: See Nursing Skin Assessment for further details  [x ]Normal    [ ]Rash  [ ]Pressure ulcer(s)       Present on admission [ ]y [ ]n   [  ]  Wound    [  ] hyperpigmentation      CRITICAL CARE:  [ ]Shock Present  [ ]Septic [ ]Cardiogenic [ ]Neurologic [ ]Hypovolemic  [ ]Vasopressors [ ]Inotropes  [ ]Respiratory failure present [ ]Mechanical Ventilation [ ]Non-invasive ventilatory support [ ]High-Flow   [ ]Acute  [ ]Chronic [ ]Hypoxic  [ ]Hypercarbic [ ]Other  [ ]Other organ failure     LABS:                                                            9.3    4.37  )-----------( 145      ( 12 Jan 2023 06:20 )             28.7       01-12    136  |  101  |  7   ----------------------------<  156<H>  4.1   |  24  |  0.56    Ca    9.5      12 Jan 2023 06:20  Phos  3.7     01-12  Mg     1.80     01-12          RADIOLOGY & ADDITIONAL STUDIES: reviewed     Protein Calorie Malnutrition Present: [ ]mild [ ]moderate [ ]severe [ ]underweight [ ]morbid obesity  https://www.andeal.org/vault/2440/web/files/ONC/Table_Clinical%20Characteristics%20to%20Document%20Malnutrition-White%20JV%20et%20al%202012.pdf    Height (cm): 149 (12-29-22 @ 22:41), 149 (12-27-22 @ 16:21), 147.3 (10-22-22 @ 08:57)  Weight (kg): 43.5 (12-27-22 @ 16:21), 47 (10-22-22 @ 08:57), 45.9 (07-20-22 @ 11:51)  BMI (kg/m2): 19.6 (12-29-22 @ 22:41), 19.6 (12-27-22 @ 16:21), 21.7 (10-22-22 @ 08:57)    [ ]PPSV2 < or = 30%  [ ]significant weight loss [ ]poor nutritional intake [ ]anasarca   [ ]Artificial Nutrition    REFERRALS:   [ ]Chaplaincy  [ ]Hospice  [ ]Child Life  [ ]Social Work  [ x]Case management [ ]Holistic Therapy

## 2023-01-14 LAB
GLUCOSE BLDC GLUCOMTR-MCNC: 146 MG/DL — HIGH (ref 70–99)
GLUCOSE BLDC GLUCOMTR-MCNC: 148 MG/DL — HIGH (ref 70–99)
GLUCOSE BLDC GLUCOMTR-MCNC: 179 MG/DL — HIGH (ref 70–99)
GLUCOSE BLDC GLUCOMTR-MCNC: 237 MG/DL — HIGH (ref 70–99)

## 2023-01-14 PROCEDURE — 99233 SBSQ HOSP IP/OBS HIGH 50: CPT

## 2023-01-14 RX ORDER — ACETAMINOPHEN 500 MG
625 TABLET ORAL ONCE
Refills: 0 | Status: COMPLETED | OUTPATIENT
Start: 2023-01-14 | End: 2023-01-14

## 2023-01-14 RX ORDER — MECLIZINE HCL 12.5 MG
12.5 TABLET ORAL EVERY 6 HOURS
Refills: 0 | Status: DISCONTINUED | OUTPATIENT
Start: 2023-01-14 | End: 2023-02-03

## 2023-01-14 RX ADMIN — ENOXAPARIN SODIUM 40 MILLIGRAM(S): 100 INJECTION SUBCUTANEOUS at 18:19

## 2023-01-14 RX ADMIN — Medication 3 MILLIGRAM(S): at 21:57

## 2023-01-14 RX ADMIN — Medication 2: at 18:18

## 2023-01-14 RX ADMIN — Medication 400 UNIT(S): at 12:37

## 2023-01-14 RX ADMIN — POLYETHYLENE GLYCOL 3350 17 GRAM(S): 17 POWDER, FOR SOLUTION ORAL at 06:57

## 2023-01-14 RX ADMIN — OXYCODONE HYDROCHLORIDE 15 MILLIGRAM(S): 5 TABLET ORAL at 22:02

## 2023-01-14 RX ADMIN — Medication 1 TABLET(S): at 13:09

## 2023-01-14 RX ADMIN — Medication 650 MILLIGRAM(S): at 19:00

## 2023-01-14 RX ADMIN — PANTOPRAZOLE SODIUM 40 MILLIGRAM(S): 20 TABLET, DELAYED RELEASE ORAL at 06:57

## 2023-01-14 RX ADMIN — METHADONE HYDROCHLORIDE 2.5 MILLIGRAM(S): 40 TABLET ORAL at 13:18

## 2023-01-14 RX ADMIN — OXYCODONE HYDROCHLORIDE 15 MILLIGRAM(S): 5 TABLET ORAL at 23:00

## 2023-01-14 RX ADMIN — Medication 625 MILLIGRAM(S): at 01:30

## 2023-01-14 RX ADMIN — Medication 250 MILLIGRAM(S): at 00:41

## 2023-01-14 RX ADMIN — Medication 650 MILLIGRAM(S): at 11:15

## 2023-01-14 RX ADMIN — ONDANSETRON 4 MILLIGRAM(S): 8 TABLET, FILM COATED ORAL at 12:31

## 2023-01-14 RX ADMIN — Medication 4: at 12:35

## 2023-01-14 RX ADMIN — METHADONE HYDROCHLORIDE 2.5 MILLIGRAM(S): 40 TABLET ORAL at 21:56

## 2023-01-14 RX ADMIN — Medication 650 MILLIGRAM(S): at 18:17

## 2023-01-14 RX ADMIN — ONDANSETRON 4 MILLIGRAM(S): 8 TABLET, FILM COATED ORAL at 21:57

## 2023-01-14 RX ADMIN — METHADONE HYDROCHLORIDE 2.5 MILLIGRAM(S): 40 TABLET ORAL at 06:56

## 2023-01-14 RX ADMIN — MONTELUKAST 10 MILLIGRAM(S): 4 TABLET, CHEWABLE ORAL at 21:58

## 2023-01-14 RX ADMIN — Medication 650 MILLIGRAM(S): at 10:07

## 2023-01-14 RX ADMIN — GABAPENTIN 300 MILLIGRAM(S): 400 CAPSULE ORAL at 06:57

## 2023-01-14 RX ADMIN — GABAPENTIN 300 MILLIGRAM(S): 400 CAPSULE ORAL at 13:21

## 2023-01-14 RX ADMIN — GABAPENTIN 300 MILLIGRAM(S): 400 CAPSULE ORAL at 21:57

## 2023-01-14 RX ADMIN — ONDANSETRON 4 MILLIGRAM(S): 8 TABLET, FILM COATED ORAL at 06:57

## 2023-01-14 RX ADMIN — CHLORHEXIDINE GLUCONATE 1 APPLICATION(S): 213 SOLUTION TOPICAL at 12:36

## 2023-01-14 NOTE — PROGRESS NOTE ADULT - PROBLEM SELECTOR PLAN 1
p/w 1mo progressive LLE pain and difficulty ambulating x1wk; likely in setting of known L femur metastatic disease; previously on 5mg oxycodone and increased to 10mg; f/w pain management and recently started on methadone 5mg in the last week without relief  - XR L knee/hip/pelvis/femur neg acute fx  - pain control per palliative: 2.5 mg methadone, oxycodone 5mg q8 standing (pt can refuse), 10 mg q 4 for mod pain and 15 mg IR q 4 for severe pain.  - bowel regimen while on opioids- last BM 1/12  - MRI 1/5 completed, no surgical intervention. Plan for RT 5 sessions starting ?1/17

## 2023-01-14 NOTE — PROGRESS NOTE ADULT - SUBJECTIVE AND OBJECTIVE BOX
[facility] Department of Hospital Medicine  Lorie Soto DO  Available on MS Teams  Pager: [phone]    Patient is a 57y old  Female who presents with a chief complaint of Difficulty ambulating (09 Jan 2023 15:07)    Subjective:  Pt seen and examined at bedside sleeping comfortably. Denies any acute complaints. Called daughter but went to , will try again later.    Vital Signs Last 24 Hrs  T(C): 36.9 (14 Jan 2023 12:43), Max: 37.6 (13 Jan 2023 20:28)  T(F): 98.5 (14 Jan 2023 12:43), Max: 99.6 (13 Jan 2023 20:28)  HR: 75 (14 Jan 2023 12:43) (75 - 99)  BP: 104/62 (14 Jan 2023 12:43) (104/62 - 129/72)  BP(mean): --  RR: 18 (14 Jan 2023 12:43) (17 - 18)  SpO2: 99% (14 Jan 2023 12:43) (97% - 99%)    Parameters below as of 14 Jan 2023 12:43  Patient On (Oxygen Delivery Method): room air    PHYSICAL EXAM:  Constitutional: cachectic elderly F; sleeping comfortably in bed  Head: NC/AT  Eyes: PERRL, EOMI, anicteric sclera  ENT: no nasal discharge; MMM  Neck: supple; no JVD  Respiratory: CTA B/L; no W/R/R; on RA without respiratory distress  Cardiac: +S1/S2; RRR; no M/R/G  Gastrointestinal: soft, scaphoid abd; NT/ND; no rebound or guarding; +BSx4  Extremities: WWP, no clubbing or cyanosis; no peripheral edema; peripheral mm wasting  Musculoskeletal: moves all extremities spontaneously  Vascular: 2+ radial, DP/PT pulses B/L  Dermatologic: skin warm, dry and intact; no rashes, wounds, or scars  Neurologic: CNII-XII grossly intact; no focal deficits  Psychiatric: affect and characteristics of appearance, verbalizations, behaviors are appropriate    MEDICATIONS  (STANDING):  bisacodyl 5 milliGRAM(s) Oral at bedtime  chlorhexidine 2% Cloths 1 Application(s) Topical daily  cholecalciferol 400 Unit(s) Oral daily  dextrose 5%. 1000 milliLiter(s) (50 mL/Hr) IV Continuous <Continuous>  dextrose 5%. 1000 milliLiter(s) (100 mL/Hr) IV Continuous <Continuous>  dextrose 50% Injectable 25 Gram(s) IV Push once  dextrose 50% Injectable 12.5 Gram(s) IV Push once  dextrose 50% Injectable 25 Gram(s) IV Push once  enoxaparin Injectable 40 milliGRAM(s) SubCutaneous every 24 hours  gabapentin 300 milliGRAM(s) Oral three times a day  glucagon  Injectable 1 milliGRAM(s) IntraMuscular once  influenza   Vaccine 0.5 milliLiter(s) IntraMuscular once  insulin lispro (ADMELOG) corrective regimen sliding scale   SubCutaneous three times a day before meals  insulin lispro (ADMELOG) corrective regimen sliding scale   SubCutaneous at bedtime  melatonin 3 milliGRAM(s) Oral at bedtime  methadone    Tablet 2.5 milliGRAM(s) Oral every 8 hours  montelukast 10 milliGRAM(s) Oral at bedtime  multivitamin 1 Tablet(s) Oral daily  ondansetron   Disintegrating Tablet 4 milliGRAM(s) Oral every 6 hours  pantoprazole    Tablet 40 milliGRAM(s) Oral before breakfast  polyethylene glycol 3350 17 Gram(s) Oral two times a day  senna 2 Tablet(s) Oral at bedtime    MEDICATIONS  (PRN):  acetaminophen     Tablet .. 650 milliGRAM(s) Oral every 6 hours PRN Temp greater or equal to 38C (100.4F), Mild Pain (1 - 3)  dextrose Oral Gel 15 Gram(s) Oral once PRN Blood Glucose LESS THAN 70 milliGRAM(s)/deciliter  oxyCODONE    IR 10 milliGRAM(s) Oral every 4 hours PRN Moderate Pain (4 - 6)  oxyCODONE    IR 15 milliGRAM(s) Oral every 4 hours PRN Severe Pain (7 - 10)  prochlorperazine   Injectable 5 milliGRAM(s) IV Push every 8 hours PRN refractory nausea    Lab holiday

## 2023-01-14 NOTE — PROGRESS NOTE ADULT - PROBLEM SELECTOR PLAN 2
- pt with hx pancreatic CA f/w Dr. Katty Navarro @ [facility]; known mets to L spine and L femur, on chemo (ambraxane and gemzar) last dose 12/27 and started radiation to L spine early December x1 session   - appreciate rad on, nsx and med onc recs   - plan for inpt RT as above

## 2023-01-15 LAB
GLUCOSE BLDC GLUCOMTR-MCNC: 154 MG/DL — HIGH (ref 70–99)
GLUCOSE BLDC GLUCOMTR-MCNC: 155 MG/DL — HIGH (ref 70–99)
GLUCOSE BLDC GLUCOMTR-MCNC: 194 MG/DL — HIGH (ref 70–99)
GLUCOSE BLDC GLUCOMTR-MCNC: 208 MG/DL — HIGH (ref 70–99)

## 2023-01-15 PROCEDURE — 99233 SBSQ HOSP IP/OBS HIGH 50: CPT

## 2023-01-15 RX ORDER — ACETAMINOPHEN 500 MG
625 TABLET ORAL ONCE
Refills: 0 | Status: COMPLETED | OUTPATIENT
Start: 2023-01-15 | End: 2023-01-15

## 2023-01-15 RX ADMIN — CHLORHEXIDINE GLUCONATE 1 APPLICATION(S): 213 SOLUTION TOPICAL at 12:23

## 2023-01-15 RX ADMIN — OXYCODONE HYDROCHLORIDE 15 MILLIGRAM(S): 5 TABLET ORAL at 18:35

## 2023-01-15 RX ADMIN — OXYCODONE HYDROCHLORIDE 15 MILLIGRAM(S): 5 TABLET ORAL at 06:47

## 2023-01-15 RX ADMIN — ONDANSETRON 4 MILLIGRAM(S): 8 TABLET, FILM COATED ORAL at 15:06

## 2023-01-15 RX ADMIN — OXYCODONE HYDROCHLORIDE 15 MILLIGRAM(S): 5 TABLET ORAL at 23:51

## 2023-01-15 RX ADMIN — Medication 250 MILLIGRAM(S): at 22:06

## 2023-01-15 RX ADMIN — Medication 1 TABLET(S): at 11:36

## 2023-01-15 RX ADMIN — OXYCODONE HYDROCHLORIDE 10 MILLIGRAM(S): 5 TABLET ORAL at 10:21

## 2023-01-15 RX ADMIN — METHADONE HYDROCHLORIDE 2.5 MILLIGRAM(S): 40 TABLET ORAL at 15:10

## 2023-01-15 RX ADMIN — Medication 2: at 08:40

## 2023-01-15 RX ADMIN — OXYCODONE HYDROCHLORIDE 15 MILLIGRAM(S): 5 TABLET ORAL at 07:47

## 2023-01-15 RX ADMIN — GABAPENTIN 300 MILLIGRAM(S): 400 CAPSULE ORAL at 06:03

## 2023-01-15 RX ADMIN — POLYETHYLENE GLYCOL 3350 17 GRAM(S): 17 POWDER, FOR SOLUTION ORAL at 18:36

## 2023-01-15 RX ADMIN — METHADONE HYDROCHLORIDE 2.5 MILLIGRAM(S): 40 TABLET ORAL at 06:02

## 2023-01-15 RX ADMIN — METHADONE HYDROCHLORIDE 2.5 MILLIGRAM(S): 40 TABLET ORAL at 22:01

## 2023-01-15 RX ADMIN — GABAPENTIN 300 MILLIGRAM(S): 400 CAPSULE ORAL at 15:05

## 2023-01-15 RX ADMIN — OXYCODONE HYDROCHLORIDE 15 MILLIGRAM(S): 5 TABLET ORAL at 12:20

## 2023-01-15 RX ADMIN — ENOXAPARIN SODIUM 40 MILLIGRAM(S): 100 INJECTION SUBCUTANEOUS at 18:34

## 2023-01-15 RX ADMIN — Medication 2: at 12:54

## 2023-01-15 RX ADMIN — OXYCODONE HYDROCHLORIDE 15 MILLIGRAM(S): 5 TABLET ORAL at 11:36

## 2023-01-15 RX ADMIN — Medication 2: at 18:33

## 2023-01-15 RX ADMIN — SENNA PLUS 2 TABLET(S): 8.6 TABLET ORAL at 22:02

## 2023-01-15 RX ADMIN — ONDANSETRON 4 MILLIGRAM(S): 8 TABLET, FILM COATED ORAL at 02:45

## 2023-01-15 RX ADMIN — ONDANSETRON 4 MILLIGRAM(S): 8 TABLET, FILM COATED ORAL at 22:03

## 2023-01-15 RX ADMIN — OXYCODONE HYDROCHLORIDE 15 MILLIGRAM(S): 5 TABLET ORAL at 03:08

## 2023-01-15 RX ADMIN — ONDANSETRON 4 MILLIGRAM(S): 8 TABLET, FILM COATED ORAL at 10:18

## 2023-01-15 RX ADMIN — Medication 625 MILLIGRAM(S): at 22:21

## 2023-01-15 RX ADMIN — OXYCODONE HYDROCHLORIDE 15 MILLIGRAM(S): 5 TABLET ORAL at 02:44

## 2023-01-15 RX ADMIN — GABAPENTIN 300 MILLIGRAM(S): 400 CAPSULE ORAL at 22:02

## 2023-01-15 RX ADMIN — MONTELUKAST 10 MILLIGRAM(S): 4 TABLET, CHEWABLE ORAL at 22:04

## 2023-01-15 RX ADMIN — PANTOPRAZOLE SODIUM 40 MILLIGRAM(S): 20 TABLET, DELAYED RELEASE ORAL at 06:03

## 2023-01-15 RX ADMIN — OXYCODONE HYDROCHLORIDE 10 MILLIGRAM(S): 5 TABLET ORAL at 11:36

## 2023-01-15 RX ADMIN — Medication 400 UNIT(S): at 11:36

## 2023-01-15 NOTE — PROGRESS NOTE ADULT - SUBJECTIVE AND OBJECTIVE BOX
[facility] Department of Hospital Medicine  Lorie Soto DO  Available on MS Teams  Pager: [phone]    Patient is a 57y old  Female who presents with a chief complaint of Difficulty ambulating (09 Jan 2023 15:07)    Subjective:  Pt seen and examined at bedside sleeping comfortably, asks to talk later.     Vital Signs Last 24 Hrs  T(C): 36.3 (15 Jameel 2023 12:42), Max: 37.4 (15 Jameel 2023 05:00)  T(F): 97.3 (15 Jameel 2023 12:42), Max: 99.3 (15 Jameel 2023 05:00)  HR: 81 (15 Jameel 2023 12:42) (75 - 86)  BP: 130/76 (15 Jameel 2023 12:42) (99/59 - 130/76)  BP(mean): --  RR: 18 (15 Jameel 2023 12:42) (17 - 18)  SpO2: 95% (15 Jameel 2023 12:42) (95% - 98%)    Parameters below as of 15 Jameel 2023 12:42  Patient On (Oxygen Delivery Method): room air    PHYSICAL EXAM:  Constitutional: cachectic elderly F; sleeping comfortably in bed  Head: NC/AT  Eyes: PERRL, EOMI, anicteric sclera  ENT: no nasal discharge; MMM  Neck: supple; no JVD  Respiratory: CTA B/L; no W/R/R; on RA without respiratory distress  Cardiac: +S1/S2; RRR; no M/R/G  Gastrointestinal: soft, scaphoid abd; NT/ND; no rebound or guarding; +BSx4  Extremities: WWP, no clubbing or cyanosis; no peripheral edema; peripheral mm wasting  Musculoskeletal: moves all extremities spontaneously  Vascular: 2+ radial, DP/PT pulses B/L  Dermatologic: skin warm, dry and intact; no rashes, wounds, or scars  Neurologic: CNII-XII grossly intact; no focal deficits  Psychiatric: affect and characteristics of appearance, verbalizations, behaviors are appropriate    MEDICATIONS  (STANDING):  bisacodyl 5 milliGRAM(s) Oral at bedtime  chlorhexidine 2% Cloths 1 Application(s) Topical daily  cholecalciferol 400 Unit(s) Oral daily  dextrose 5%. 1000 milliLiter(s) (50 mL/Hr) IV Continuous <Continuous>  dextrose 5%. 1000 milliLiter(s) (100 mL/Hr) IV Continuous <Continuous>  dextrose 50% Injectable 25 Gram(s) IV Push once  dextrose 50% Injectable 12.5 Gram(s) IV Push once  dextrose 50% Injectable 25 Gram(s) IV Push once  enoxaparin Injectable 40 milliGRAM(s) SubCutaneous every 24 hours  gabapentin 300 milliGRAM(s) Oral three times a day  glucagon  Injectable 1 milliGRAM(s) IntraMuscular once  influenza   Vaccine 0.5 milliLiter(s) IntraMuscular once  insulin lispro (ADMELOG) corrective regimen sliding scale   SubCutaneous three times a day before meals  insulin lispro (ADMELOG) corrective regimen sliding scale   SubCutaneous at bedtime  melatonin 3 milliGRAM(s) Oral at bedtime  methadone    Tablet 2.5 milliGRAM(s) Oral every 8 hours  montelukast 10 milliGRAM(s) Oral at bedtime  multivitamin 1 Tablet(s) Oral daily  ondansetron   Disintegrating Tablet 4 milliGRAM(s) Oral every 6 hours  pantoprazole    Tablet 40 milliGRAM(s) Oral before breakfast  polyethylene glycol 3350 17 Gram(s) Oral two times a day  senna 2 Tablet(s) Oral at bedtime    MEDICATIONS  (PRN):  acetaminophen     Tablet .. 650 milliGRAM(s) Oral every 6 hours PRN Temp greater or equal to 38C (100.4F), Mild Pain (1 - 3)  dextrose Oral Gel 15 Gram(s) Oral once PRN Blood Glucose LESS THAN 70 milliGRAM(s)/deciliter  meclizine 12.5 milliGRAM(s) Oral every 6 hours PRN Dizziness  oxyCODONE    IR 10 milliGRAM(s) Oral every 4 hours PRN Moderate Pain (4 - 6)  oxyCODONE    IR 15 milliGRAM(s) Oral every 4 hours PRN Severe Pain (7 - 10)  prochlorperazine   Injectable 5 milliGRAM(s) IV Push every 8 hours PRN refractory nausea      Lab holiday

## 2023-01-15 NOTE — PROGRESS NOTE ADULT - PROBLEM SELECTOR PLAN 2
- pt with hx pancreatic CA f/w Dr. Katty Navarro @ [facility]; known mets to L spine and L femur, on chemo (ambraxane and gemzar) last dose 12/27 and started radiation to L spine early December x1 session   - appreciate rad on, nsx and med onc recs   - plan for inpt RT as above

## 2023-01-16 LAB
ANION GAP SERPL CALC-SCNC: 11 MMOL/L — SIGNIFICANT CHANGE UP (ref 7–14)
BUN SERPL-MCNC: 8 MG/DL — SIGNIFICANT CHANGE UP (ref 7–23)
CALCIUM SERPL-MCNC: 9.6 MG/DL — SIGNIFICANT CHANGE UP (ref 8.4–10.5)
CHLORIDE SERPL-SCNC: 98 MMOL/L — SIGNIFICANT CHANGE UP (ref 98–107)
CO2 SERPL-SCNC: 26 MMOL/L — SIGNIFICANT CHANGE UP (ref 22–31)
CREAT SERPL-MCNC: 0.62 MG/DL — SIGNIFICANT CHANGE UP (ref 0.5–1.3)
EGFR: 104 ML/MIN/1.73M2 — SIGNIFICANT CHANGE UP
GLUCOSE BLDC GLUCOMTR-MCNC: 149 MG/DL — HIGH (ref 70–99)
GLUCOSE BLDC GLUCOMTR-MCNC: 156 MG/DL — HIGH (ref 70–99)
GLUCOSE BLDC GLUCOMTR-MCNC: 168 MG/DL — HIGH (ref 70–99)
GLUCOSE BLDC GLUCOMTR-MCNC: 181 MG/DL — HIGH (ref 70–99)
GLUCOSE SERPL-MCNC: 131 MG/DL — HIGH (ref 70–99)
HCT VFR BLD CALC: 32.7 % — LOW (ref 34.5–45)
HGB BLD-MCNC: 10.5 G/DL — LOW (ref 11.5–15.5)
MAGNESIUM SERPL-MCNC: 1.7 MG/DL — SIGNIFICANT CHANGE UP (ref 1.6–2.6)
MCHC RBC-ENTMCNC: 25.1 PG — LOW (ref 27–34)
MCHC RBC-ENTMCNC: 32.1 GM/DL — SIGNIFICANT CHANGE UP (ref 32–36)
MCV RBC AUTO: 78.2 FL — LOW (ref 80–100)
NRBC # BLD: 0 /100 WBCS — SIGNIFICANT CHANGE UP (ref 0–0)
NRBC # FLD: 0 K/UL — SIGNIFICANT CHANGE UP (ref 0–0)
PHOSPHATE SERPL-MCNC: 4.2 MG/DL — SIGNIFICANT CHANGE UP (ref 2.5–4.5)
PLATELET # BLD AUTO: 187 K/UL — SIGNIFICANT CHANGE UP (ref 150–400)
POTASSIUM SERPL-MCNC: 4.2 MMOL/L — SIGNIFICANT CHANGE UP (ref 3.5–5.3)
POTASSIUM SERPL-SCNC: 4.2 MMOL/L — SIGNIFICANT CHANGE UP (ref 3.5–5.3)
RBC # BLD: 4.18 M/UL — SIGNIFICANT CHANGE UP (ref 3.8–5.2)
RBC # FLD: 15.2 % — HIGH (ref 10.3–14.5)
SODIUM SERPL-SCNC: 135 MMOL/L — SIGNIFICANT CHANGE UP (ref 135–145)
WBC # BLD: 4.53 K/UL — SIGNIFICANT CHANGE UP (ref 3.8–10.5)
WBC # FLD AUTO: 4.53 K/UL — SIGNIFICANT CHANGE UP (ref 3.8–10.5)

## 2023-01-16 PROCEDURE — 99233 SBSQ HOSP IP/OBS HIGH 50: CPT

## 2023-01-16 RX ADMIN — GABAPENTIN 300 MILLIGRAM(S): 400 CAPSULE ORAL at 14:42

## 2023-01-16 RX ADMIN — METHADONE HYDROCHLORIDE 2.5 MILLIGRAM(S): 40 TABLET ORAL at 05:26

## 2023-01-16 RX ADMIN — Medication 400 UNIT(S): at 12:42

## 2023-01-16 RX ADMIN — Medication 2: at 12:40

## 2023-01-16 RX ADMIN — Medication 1 TABLET(S): at 12:42

## 2023-01-16 RX ADMIN — OXYCODONE HYDROCHLORIDE 15 MILLIGRAM(S): 5 TABLET ORAL at 06:26

## 2023-01-16 RX ADMIN — METHADONE HYDROCHLORIDE 2.5 MILLIGRAM(S): 40 TABLET ORAL at 14:41

## 2023-01-16 RX ADMIN — ONDANSETRON 4 MILLIGRAM(S): 8 TABLET, FILM COATED ORAL at 10:03

## 2023-01-16 RX ADMIN — CHLORHEXIDINE GLUCONATE 1 APPLICATION(S): 213 SOLUTION TOPICAL at 12:39

## 2023-01-16 RX ADMIN — METHADONE HYDROCHLORIDE 2.5 MILLIGRAM(S): 40 TABLET ORAL at 21:32

## 2023-01-16 RX ADMIN — ENOXAPARIN SODIUM 40 MILLIGRAM(S): 100 INJECTION SUBCUTANEOUS at 17:27

## 2023-01-16 RX ADMIN — Medication 2: at 18:12

## 2023-01-16 RX ADMIN — OXYCODONE HYDROCHLORIDE 15 MILLIGRAM(S): 5 TABLET ORAL at 21:32

## 2023-01-16 RX ADMIN — PANTOPRAZOLE SODIUM 40 MILLIGRAM(S): 20 TABLET, DELAYED RELEASE ORAL at 05:26

## 2023-01-16 RX ADMIN — OXYCODONE HYDROCHLORIDE 15 MILLIGRAM(S): 5 TABLET ORAL at 05:26

## 2023-01-16 RX ADMIN — GABAPENTIN 300 MILLIGRAM(S): 400 CAPSULE ORAL at 21:33

## 2023-01-16 RX ADMIN — ONDANSETRON 4 MILLIGRAM(S): 8 TABLET, FILM COATED ORAL at 21:33

## 2023-01-16 RX ADMIN — GABAPENTIN 300 MILLIGRAM(S): 400 CAPSULE ORAL at 05:27

## 2023-01-16 RX ADMIN — OXYCODONE HYDROCHLORIDE 15 MILLIGRAM(S): 5 TABLET ORAL at 22:30

## 2023-01-16 RX ADMIN — OXYCODONE HYDROCHLORIDE 15 MILLIGRAM(S): 5 TABLET ORAL at 00:51

## 2023-01-16 RX ADMIN — OXYCODONE HYDROCHLORIDE 15 MILLIGRAM(S): 5 TABLET ORAL at 12:53

## 2023-01-16 RX ADMIN — MONTELUKAST 10 MILLIGRAM(S): 4 TABLET, CHEWABLE ORAL at 21:33

## 2023-01-16 RX ADMIN — OXYCODONE HYDROCHLORIDE 15 MILLIGRAM(S): 5 TABLET ORAL at 13:53

## 2023-01-16 RX ADMIN — ONDANSETRON 4 MILLIGRAM(S): 8 TABLET, FILM COATED ORAL at 17:27

## 2023-01-16 NOTE — PROGRESS NOTE ADULT - PROBLEM SELECTOR PLAN 2
- pt with hx pancreatic CA f/w Dr. Katty Navarro @ [facility]; known mets to L spine and L femur, on chemo (ambraxane and gemzar) last dose 12/27 and started radiation to L spine early December x1 session   - appreciate rad on, nsx and med onc recs   - plan for inpt RT as above

## 2023-01-16 NOTE — PROGRESS NOTE ADULT - SUBJECTIVE AND OBJECTIVE BOX
[facility] Department of Hospital Medicine  Lorie Soto DO  Available on MS Teams  Pager: [phone]    Patient is a 57y old  Female who presents with a chief complaint of Difficulty ambulating (09 Jan 2023 15:07)    Subjective:  Pt seen and examined at bedside sleeping, easily arousable. Says leg is still hurting. Daughter unhappy that radiation can't be done today d/t holiday but understands it will be started tomorrow.     Vital Signs Last 24 Hrs  T(C): 36.3 (16 Jan 2023 05:20), Max: 36.3 (15 Jameel 2023 12:42)  T(F): 97.4 (16 Jan 2023 05:20), Max: 97.4 (16 Jan 2023 05:20)  HR: 82 (16 Jan 2023 05:20) (81 - 82)  BP: 104/63 (16 Jan 2023 05:20) (104/63 - 130/76)  BP(mean): --  RR: 17 (16 Jan 2023 05:20) (17 - 18)  SpO2: 98% (16 Jan 2023 05:20) (95% - 98%)    Parameters below as of 16 Jan 2023 05:20  Patient On (Oxygen Delivery Method): room air    PHYSICAL EXAM:  Constitutional: cachectic elderly F; sleeping comfortably in bed  Head: NC/AT  Eyes: PERRL, EOMI, anicteric sclera  ENT: no nasal discharge; MMM  Neck: supple; no JVD  Respiratory: CTA B/L; no W/R/R; on RA without respiratory distress  Cardiac: +S1/S2; RRR; no M/R/G  Gastrointestinal: soft, scaphoid abd; NT/ND; no rebound or guarding; +BSx4  Extremities: WWP, no clubbing or cyanosis; no peripheral edema; peripheral mm wasting  Musculoskeletal: moves all extremities spontaneously  Vascular: 2+ radial, DP/PT pulses B/L  Dermatologic: skin warm, dry and intact; no rashes, wounds, or scars  Neurologic: CNII-XII grossly intact; no focal deficits  Psychiatric: affect and characteristics of appearance, verbalizations, behaviors are appropriate    MEDICATIONS  (STANDING):  bisacodyl 5 milliGRAM(s) Oral at bedtime  chlorhexidine 2% Cloths 1 Application(s) Topical daily  cholecalciferol 400 Unit(s) Oral daily  dextrose 5%. 1000 milliLiter(s) (50 mL/Hr) IV Continuous <Continuous>  dextrose 5%. 1000 milliLiter(s) (100 mL/Hr) IV Continuous <Continuous>  dextrose 50% Injectable 25 Gram(s) IV Push once  dextrose 50% Injectable 12.5 Gram(s) IV Push once  dextrose 50% Injectable 25 Gram(s) IV Push once  enoxaparin Injectable 40 milliGRAM(s) SubCutaneous every 24 hours  gabapentin 300 milliGRAM(s) Oral three times a day  glucagon  Injectable 1 milliGRAM(s) IntraMuscular once  influenza   Vaccine 0.5 milliLiter(s) IntraMuscular once  insulin lispro (ADMELOG) corrective regimen sliding scale   SubCutaneous three times a day before meals  insulin lispro (ADMELOG) corrective regimen sliding scale   SubCutaneous at bedtime  melatonin 3 milliGRAM(s) Oral at bedtime  methadone    Tablet 2.5 milliGRAM(s) Oral every 8 hours  montelukast 10 milliGRAM(s) Oral at bedtime  multivitamin 1 Tablet(s) Oral daily  ondansetron   Disintegrating Tablet 4 milliGRAM(s) Oral every 6 hours  pantoprazole    Tablet 40 milliGRAM(s) Oral before breakfast  polyethylene glycol 3350 17 Gram(s) Oral two times a day  senna 2 Tablet(s) Oral at bedtime    MEDICATIONS  (PRN):  acetaminophen     Tablet .. 650 milliGRAM(s) Oral every 6 hours PRN Temp greater or equal to 38C (100.4F), Mild Pain (1 - 3)  dextrose Oral Gel 15 Gram(s) Oral once PRN Blood Glucose LESS THAN 70 milliGRAM(s)/deciliter  meclizine 12.5 milliGRAM(s) Oral every 6 hours PRN Dizziness  oxyCODONE    IR 10 milliGRAM(s) Oral every 4 hours PRN Moderate Pain (4 - 6)  oxyCODONE    IR 15 milliGRAM(s) Oral every 4 hours PRN Severe Pain (7 - 10)  prochlorperazine   Injectable 5 milliGRAM(s) IV Push every 8 hours PRN refractory nausea      LABS:                        10.5   4.53  )-----------( 187      ( 16 Jan 2023 06:00 )             32.7     01-16    135  |  98  |  8   ----------------------------<  131<H>  4.2   |  26  |  0.62    Ca    9.6      16 Jan 2023 06:00  Phos  4.2     01-16  Mg     1.70     01-16          CAPILLARY BLOOD GLUCOSE      POCT Blood Glucose.: 149 mg/dL (16 Jan 2023 08:28)      RADIOLOGY & ADDITIONAL TESTS: Reviewed.

## 2023-01-16 NOTE — PROGRESS NOTE ADULT - ASSESSMENT
56 yo Urdu speaking F with PMH pancreatic CA (mets to L spine, L femur; Cong, chemo ambraxane/gemzar 12/27, radiation ?12/15; [place] 2017), T2D, GERD, asthma, and HTN p/w LLE pain x 1mo with difficulty ambulating x1wk. Likely in the setting of known metastatic disease. XR showed lucency on the L femoral neck. Course c/b severe pain, N/V, constipation -- palliative following for pain recs. Ortho deferring surgical management of L femur until radiation completed. Rad-onc following, stim already done. Plan for radiation ?1/17.

## 2023-01-17 LAB
GLUCOSE BLDC GLUCOMTR-MCNC: 130 MG/DL — HIGH (ref 70–99)
GLUCOSE BLDC GLUCOMTR-MCNC: 147 MG/DL — HIGH (ref 70–99)
GLUCOSE BLDC GLUCOMTR-MCNC: 147 MG/DL — HIGH (ref 70–99)
GLUCOSE BLDC GLUCOMTR-MCNC: 269 MG/DL — HIGH (ref 70–99)

## 2023-01-17 PROCEDURE — 99233 SBSQ HOSP IP/OBS HIGH 50: CPT

## 2023-01-17 PROCEDURE — 99232 SBSQ HOSP IP/OBS MODERATE 35: CPT

## 2023-01-17 RX ORDER — DEXAMETHASONE 0.5 MG/5ML
2 ELIXIR ORAL
Refills: 0 | Status: DISCONTINUED | OUTPATIENT
Start: 2023-01-17 | End: 2023-01-19

## 2023-01-17 RX ORDER — OXYCODONE HYDROCHLORIDE 5 MG/1
15 TABLET ORAL EVERY 4 HOURS
Refills: 0 | Status: DISCONTINUED | OUTPATIENT
Start: 2023-01-17 | End: 2023-01-19

## 2023-01-17 RX ORDER — OXYCODONE HYDROCHLORIDE 5 MG/1
20 TABLET ORAL EVERY 4 HOURS
Refills: 0 | Status: DISCONTINUED | OUTPATIENT
Start: 2023-01-17 | End: 2023-01-19

## 2023-01-17 RX ORDER — MORPHINE SULFATE 50 MG/1
2 CAPSULE, EXTENDED RELEASE ORAL ONCE
Refills: 0 | Status: DISCONTINUED | OUTPATIENT
Start: 2023-01-17 | End: 2023-01-17

## 2023-01-17 RX ADMIN — OXYCODONE HYDROCHLORIDE 15 MILLIGRAM(S): 5 TABLET ORAL at 06:12

## 2023-01-17 RX ADMIN — OXYCODONE HYDROCHLORIDE 20 MILLIGRAM(S): 5 TABLET ORAL at 16:30

## 2023-01-17 RX ADMIN — GABAPENTIN 300 MILLIGRAM(S): 400 CAPSULE ORAL at 14:05

## 2023-01-17 RX ADMIN — OXYCODONE HYDROCHLORIDE 15 MILLIGRAM(S): 5 TABLET ORAL at 11:38

## 2023-01-17 RX ADMIN — Medication 1 TABLET(S): at 14:05

## 2023-01-17 RX ADMIN — METHADONE HYDROCHLORIDE 2.5 MILLIGRAM(S): 40 TABLET ORAL at 22:05

## 2023-01-17 RX ADMIN — MONTELUKAST 10 MILLIGRAM(S): 4 TABLET, CHEWABLE ORAL at 22:05

## 2023-01-17 RX ADMIN — OXYCODONE HYDROCHLORIDE 15 MILLIGRAM(S): 5 TABLET ORAL at 22:04

## 2023-01-17 RX ADMIN — MORPHINE SULFATE 2 MILLIGRAM(S): 50 CAPSULE, EXTENDED RELEASE ORAL at 18:18

## 2023-01-17 RX ADMIN — OXYCODONE HYDROCHLORIDE 20 MILLIGRAM(S): 5 TABLET ORAL at 15:30

## 2023-01-17 RX ADMIN — Medication 2 MILLIGRAM(S): at 16:50

## 2023-01-17 RX ADMIN — PANTOPRAZOLE SODIUM 40 MILLIGRAM(S): 20 TABLET, DELAYED RELEASE ORAL at 05:13

## 2023-01-17 RX ADMIN — ENOXAPARIN SODIUM 40 MILLIGRAM(S): 100 INJECTION SUBCUTANEOUS at 17:53

## 2023-01-17 RX ADMIN — Medication 3 MILLIGRAM(S): at 22:04

## 2023-01-17 RX ADMIN — METHADONE HYDROCHLORIDE 2.5 MILLIGRAM(S): 40 TABLET ORAL at 14:05

## 2023-01-17 RX ADMIN — CHLORHEXIDINE GLUCONATE 1 APPLICATION(S): 213 SOLUTION TOPICAL at 10:39

## 2023-01-17 RX ADMIN — Medication 400 UNIT(S): at 14:05

## 2023-01-17 RX ADMIN — OXYCODONE HYDROCHLORIDE 15 MILLIGRAM(S): 5 TABLET ORAL at 23:04

## 2023-01-17 RX ADMIN — Medication 5 MILLIGRAM(S): at 22:04

## 2023-01-17 RX ADMIN — ONDANSETRON 4 MILLIGRAM(S): 8 TABLET, FILM COATED ORAL at 15:30

## 2023-01-17 RX ADMIN — SENNA PLUS 2 TABLET(S): 8.6 TABLET ORAL at 22:04

## 2023-01-17 RX ADMIN — OXYCODONE HYDROCHLORIDE 15 MILLIGRAM(S): 5 TABLET ORAL at 10:38

## 2023-01-17 RX ADMIN — METHADONE HYDROCHLORIDE 2.5 MILLIGRAM(S): 40 TABLET ORAL at 05:12

## 2023-01-17 RX ADMIN — MORPHINE SULFATE 2 MILLIGRAM(S): 50 CAPSULE, EXTENDED RELEASE ORAL at 17:52

## 2023-01-17 RX ADMIN — ONDANSETRON 4 MILLIGRAM(S): 8 TABLET, FILM COATED ORAL at 09:34

## 2023-01-17 RX ADMIN — OXYCODONE HYDROCHLORIDE 15 MILLIGRAM(S): 5 TABLET ORAL at 05:12

## 2023-01-17 RX ADMIN — GABAPENTIN 300 MILLIGRAM(S): 400 CAPSULE ORAL at 05:13

## 2023-01-17 RX ADMIN — GABAPENTIN 300 MILLIGRAM(S): 400 CAPSULE ORAL at 22:04

## 2023-01-17 RX ADMIN — Medication 2: at 22:48

## 2023-01-17 RX ADMIN — ONDANSETRON 4 MILLIGRAM(S): 8 TABLET, FILM COATED ORAL at 21:47

## 2023-01-17 NOTE — PROGRESS NOTE ADULT - PROBLEM SELECTOR PLAN 5
- home meds: Januvia 100mg, metformin 500mg BID  - 10/2022 A1C 7.4%, well controlled  - moderate ISS  - monitor FS qac and qHS

## 2023-01-17 NOTE — PROGRESS NOTE ADULT - PROBLEM SELECTOR PLAN 1
- Patient with pancreatic CA (mets to L spine, L femur) last chemo on 12/27  - NM Bone scan - Abnormal foci in the left femoral neck and lumbar spine highly suspicious for osseous metastases.  Osseous lesion in the left femoral neck places the patient at risk for pathologic fracture.  - Oncology recommendations appreciated.  - Patient wishes to continue to pursue DMT

## 2023-01-17 NOTE — PROGRESS NOTE ADULT - SUBJECTIVE AND OBJECTIVE BOX
Salome Hayward  [facility]  Pager #[phone]  Available on Microsoft Teams    Patient is a 58y old  Female who presents with a chief complaint of Difficulty ambulating (16 Jan 2023 11:06)      SUBJECTIVE / OVERNIGHT EVENTS: Patient seen and examined at bedside. [employer]  #[id number] used for [place] translation. Pt reports persistent pain in her left hip area radiating down to the knee. States that oxycodone works for the pain when it starts but the effect wears off after around 30 minutes. Needs help with moving around in bed due to pain. Waiting to go down for radiation today.    ADDITIONAL REVIEW OF SYSTEMS:    MEDICATIONS  (STANDING):  bisacodyl 5 milliGRAM(s) Oral at bedtime  chlorhexidine 2% Cloths 1 Application(s) Topical daily  cholecalciferol 400 Unit(s) Oral daily  dextrose 5%. 1000 milliLiter(s) (100 mL/Hr) IV Continuous <Continuous>  dextrose 5%. 1000 milliLiter(s) (50 mL/Hr) IV Continuous <Continuous>  dextrose 50% Injectable 25 Gram(s) IV Push once  dextrose 50% Injectable 12.5 Gram(s) IV Push once  dextrose 50% Injectable 25 Gram(s) IV Push once  enoxaparin Injectable 40 milliGRAM(s) SubCutaneous every 24 hours  gabapentin 300 milliGRAM(s) Oral three times a day  glucagon  Injectable 1 milliGRAM(s) IntraMuscular once  influenza   Vaccine 0.5 milliLiter(s) IntraMuscular once  insulin lispro (ADMELOG) corrective regimen sliding scale   SubCutaneous three times a day before meals  insulin lispro (ADMELOG) corrective regimen sliding scale   SubCutaneous at bedtime  melatonin 3 milliGRAM(s) Oral at bedtime  methadone    Tablet 2.5 milliGRAM(s) Oral every 8 hours  montelukast 10 milliGRAM(s) Oral at bedtime  multivitamin 1 Tablet(s) Oral daily  ondansetron   Disintegrating Tablet 4 milliGRAM(s) Oral every 6 hours  pantoprazole    Tablet 40 milliGRAM(s) Oral before breakfast  polyethylene glycol 3350 17 Gram(s) Oral two times a day  senna 2 Tablet(s) Oral at bedtime    MEDICATIONS  (PRN):  acetaminophen     Tablet .. 650 milliGRAM(s) Oral every 6 hours PRN Temp greater or equal to 38C (100.4F), Mild Pain (1 - 3)  dextrose Oral Gel 15 Gram(s) Oral once PRN Blood Glucose LESS THAN 70 milliGRAM(s)/deciliter  meclizine 12.5 milliGRAM(s) Oral every 6 hours PRN Dizziness  oxyCODONE    IR 10 milliGRAM(s) Oral every 4 hours PRN Moderate Pain (4 - 6)  oxyCODONE    IR 15 milliGRAM(s) Oral every 4 hours PRN Severe Pain (7 - 10)  prochlorperazine   Injectable 5 milliGRAM(s) IV Push every 8 hours PRN refractory nausea      CAPILLARY BLOOD GLUCOSE      POCT Blood Glucose.: 130 mg/dL (17 Jan 2023 08:35)  POCT Blood Glucose.: 156 mg/dL (16 Jan 2023 22:18)  POCT Blood Glucose.: 168 mg/dL (16 Jan 2023 18:01)  POCT Blood Glucose.: 181 mg/dL (16 Jan 2023 12:24)    I&O's Summary      PHYSICAL EXAM:    Vital Signs Last 24 Hrs  T(C): 37.1 (17 Jan 2023 05:42), Max: 37.1 (16 Jan 2023 13:06)  T(F): 98.7 (17 Jan 2023 05:42), Max: 98.7 (16 Jan 2023 13:06)  HR: 79 (17 Jan 2023 05:42) (79 - 85)  BP: 100/60 (17 Jan 2023 05:42) (100/60 - 106/66)  BP(mean): --  RR: 17 (17 Jan 2023 05:42) (17 - 18)  SpO2: 98% (17 Jan 2023 05:42) (97% - 100%)    Parameters below as of 17 Jan 2023 05:42  Patient On (Oxygen Delivery Method): room air      CONSTITUTIONAL: NAD, thin  EYES: Conjunctiva and sclera clear  ENMT: Moist oral mucosa  RESPIRATORY: Normal respiratory effort; lungs are clear to auscultation bilaterally  CARDIOVASCULAR: Regular rate and rhythm, normal S1 and S2, no murmur/rub/gallop; No lower extremity edema  ABDOMEN: Soft, nontender to palpation, normoactive bowel sounds  MUSCULOSKELETAL: L lateral hip area tender to palpation, ROM limited 2/2 pain  PSYCH: A+O to person, place, and time; affect appropriate  SKIN: No rashes; no palpable lesions    LABS:                        10.5   4.53  )-----------( 187      ( 16 Jan 2023 06:00 )             32.7     01-16    135  |  98  |  8   ----------------------------<  131<H>  4.2   |  26  |  0.62    Ca    9.6      16 Jan 2023 06:00  Phos  4.2     01-16  Mg     1.70     01-16    RADIOLOGY & ADDITIONAL TESTS: No new imaging  Results Reviewed: Yes  Imaging Personally Reviewed:  Electrocardiogram Personally Reviewed:    COORDINATION OF CARE:  Care Discussed with Consultants/Other Providers [Y/N]:  Prior or Outpatient Records Reviewed [Y/N]:

## 2023-01-17 NOTE — PROGRESS NOTE ADULT - SUBJECTIVE AND OBJECTIVE BOX
SUBJECTIVE AND OBJECTIVE:  Patient seen and examined at bedside. Patient being followed up for pain.   [facility] : [phone]. Patient states has intermittent leg pain. PRN Pain medication dose inadequate at times for pain relief and effect not long lasting. States she has nausea with intermittent emesis. Denies constipation.     INTERVAL HPI/OVERNIGHT EVENTS:  Received 3 doses of the Oxy IR 15mg in past 24 hours.     Allergies    No Known Allergies    Intolerances    MEDICATIONS  (STANDING):  bisacodyl 5 milliGRAM(s) Oral at bedtime  chlorhexidine 2% Cloths 1 Application(s) Topical daily  cholecalciferol 400 Unit(s) Oral daily  dexAMETHasone  Injectable 2 milliGRAM(s) IV Push two times a day  dextrose 5%. 1000 milliLiter(s) (100 mL/Hr) IV Continuous <Continuous>  dextrose 5%. 1000 milliLiter(s) (50 mL/Hr) IV Continuous <Continuous>  dextrose 50% Injectable 25 Gram(s) IV Push once  dextrose 50% Injectable 12.5 Gram(s) IV Push once  dextrose 50% Injectable 25 Gram(s) IV Push once  enoxaparin Injectable 40 milliGRAM(s) SubCutaneous every 24 hours  gabapentin 300 milliGRAM(s) Oral three times a day  glucagon  Injectable 1 milliGRAM(s) IntraMuscular once  influenza   Vaccine 0.5 milliLiter(s) IntraMuscular once  insulin lispro (ADMELOG) corrective regimen sliding scale   SubCutaneous three times a day before meals  insulin lispro (ADMELOG) corrective regimen sliding scale   SubCutaneous at bedtime  melatonin 3 milliGRAM(s) Oral at bedtime  methadone    Tablet 2.5 milliGRAM(s) Oral every 8 hours  montelukast 10 milliGRAM(s) Oral at bedtime  multivitamin 1 Tablet(s) Oral daily  ondansetron   Disintegrating Tablet 4 milliGRAM(s) Oral every 6 hours  pantoprazole    Tablet 40 milliGRAM(s) Oral before breakfast  polyethylene glycol 3350 17 Gram(s) Oral two times a day  senna 2 Tablet(s) Oral at bedtime    MEDICATIONS  (PRN):  acetaminophen     Tablet .. 650 milliGRAM(s) Oral every 6 hours PRN Temp greater or equal to 38C (100.4F), Mild Pain (1 - 3)  dextrose Oral Gel 15 Gram(s) Oral once PRN Blood Glucose LESS THAN 70 milliGRAM(s)/deciliter  meclizine 12.5 milliGRAM(s) Oral every 6 hours PRN Dizziness  oxyCODONE    IR 15 milliGRAM(s) Oral every 4 hours PRN Moderate Pain (4 - 6)  oxyCODONE    IR 20 milliGRAM(s) Oral every 4 hours PRN Severe Pain (7 - 10)  prochlorperazine   Injectable 5 milliGRAM(s) IV Push every 8 hours PRN refractory nausea      ITEMS UNCHECKED ARE NOT PRESENT    PRESENT SYMPTOMS: [ ]Unable to self-report due to altered mental status- see [ ] CPOT [ ] PAINADS [ ] RDOS  Source if other than patient:  [ ]Family   [ ]Team     Pain: [ x]yes [ ]no  QOL impact - severe  Location - left thigh                     Aggravating factors - movement   Quality - tingling   Radiation - left lower extremity and lower back  Timing- constant with intermittent worsening episodes   Severity (0-10 scale): 10  Minimal acceptable level / Pain Goal (0-10 scale):  7-8    Dyspnea:                           [ ]Mild [ ]Moderate [ ]Severe  Anxiety:                             [ ]Mild [ ]Moderate [ ]Severe  Agitation:                          [ ]Mild [ ]Moderate [ ]Severe  Fatigue:                             [ ]Mild [ ]Moderate [ ]Severe  Nausea:                             [x ]Mild [ ]Moderate [ ]Severe  Loss of appetite:              [x ]Mild [ ]Moderate [ ]Severe  Constipation:                   [ ]Mild [ ]Moderate [ ]Severe  Diarrhea:                          [ ]Mild [ ]Moderate [ ]Severe    CPOT:    https://[email or web address]/[email or web address]/Critical-Care-Pain-Observation-Tool-(CPOT)    PCSSQ[Palliative Care Spiritual Screening Question]   Severity (0-10):  Score of 4 or > indicate consideration of Chaplaincy referral.  Chaplaincy Referral: [ ] yes [ ] refused [ ] following [x ] deferred    Caregiver [place]? : [ ] yes [ ] no [ ] Declined [x ] Deferred              Social work referral [ ] Patient & Family Centered Care Referral [ ]     Anticipatory Grief present?:  [x ] yes [ ] no  [ ] Deferred                  Social work referral [x ] Chaplaincy Referral[ ]    Other Symptoms:  [ x]All other review of systems negative     PHYSICAL EXAM:  Vital Signs Last 24 Hrs  T(C): 37 (17 Jan 2023 13:00), Max: 37.1 (17 Jan 2023 05:42)  T(F): 98.6 (17 Jan 2023 13:00), Max: 98.7 (17 Jan 2023 05:42)  HR: 74 (17 Jan 2023 13:00) (74 - 85)  BP: 104/65 (17 Jan 2023 13:00) (100/60 - 106/66)  BP(mean): --  RR: 17 (17 Jan 2023 13:00) (17 - 18)  SpO2: 96% (17 Jan 2023 13:00) (96% - 100%)    Parameters below as of 17 Jan 2023 13:00  Patient On (Oxygen Delivery Method): room air      GENERAL:  [x ]Alert  [x ]Oriented x 3  [ ]Lethargic  [ ]Cachexia  [ ]Unarousable  [x ]Verbal  [ ]Non-Verbal    Behavioral:   [ ] Anxiety  [ ] Delirium [ ] Agitation [ x] Calm  [ ] Other  HEENT:  [ x]Normal  [ ] Temporal Wasting  [ ]Dry mouth   [ ]ET Tube/Trach  [ ]Oral lesions  [ ] Mucositis  PULMONARY:   [ x]Clear [ ]Tachypnea  [ ]Audible excessive secretions   [ ]Rhonchi        [ ]Right [ ]Left [ ]Bilateral  [ ]Crackles        [ ]Right [ ]Left [ ]Bilateral  [ ]Wheezing     [ ]Right [ ]Left [ ]Bilateral  [ ]Diminished breath sounds [ ]right [ ]left [ ]bilateral  CARDIOVASCULAR:    [ x]Regular [ ]Irregular [ ]Tachy  [ ]Dmitry [ ]Murmur [ ]Other  GASTROINTESTINAL:  [ x]Soft  [ ]Distended   [ ]+BS  [x ]Non tender [ ]Tender  [ ]PEG [ ]OGT/ NGT  Last BM: 1/16  GENITOURINARY:   [x ]Normal [ ] Incontinent   [ ]Oliguria/Anuria   [ ][place]  MUSCULOSKELETAL:   [x ]Normal , except left lower extremity movement limited due to pain  [x ]Weakness  [ ]Bed/Wheelchair bound [ ]Edema  [  ] amputation  [  ] contraction  NEUROLOGIC:   [ x]No focal deficits  [ ]Cognitive impairment  [ ]Dysphagia [ ]Dysarthria [ ]Paresis [ ]Other   SKIN: See Nursing Skin Assessment for further details  [x ]Normal    [ ]Rash  [ ]Pressure ulcer(s)       Present on admission [ ]y [ ]n   [  ]  Wound    [  ] hyperpigmentation      CRITICAL CARE:  [ ]Shock Present  [ ]Septic [ ]Cardiogenic [ ]Neurologic [ ]Hypovolemic  [ ]Vasopressors [ ]Inotropes  [ ]Respiratory failure present [ ]Mechanical Ventilation [ ]Non-invasive ventilatory support [ ]High-Flow   [ ]Acute  [ ]Chronic [ ]Hypoxic  [ ]Hypercarbic [ ]Other  [ ]Other organ failure     LABS:                                                             10.5   4.53  )-----------( 187      ( 16 Jan 2023 06:00 )             32.7       01-16    135  |  98  |  8   ----------------------------<  131<H>  4.2   |  26  |  0.62    Ca    9.6      16 Jan 2023 06:00  Phos  4.2     01-16  Mg     1.70     01-16      RADIOLOGY & ADDITIONAL STUDIES: reviewed     Protein Calorie Malnutrition Present: [ ]mild [ ]moderate [ ]severe [ ]underweight [ ]morbid obesity  https://www.andeal.org/vault/2440/web/files/ONC/Table_Clinical%20Characteristics%20to%20Document%20Malnutrition-White%20JV%20et%20al%202012.pdf    Height (cm): 149 (12-29-22 @ 22:41), 149 (12-27-22 @ 16:21), 147.3 (10-22-22 @ 08:57)  Weight (kg): 43.5 (12-27-22 @ 16:21), 47 (10-22-22 @ 08:57), 45.9 (07-20-22 @ 11:51)  BMI (kg/m2): 19.6 (12-29-22 @ 22:41), 19.6 (12-27-22 @ 16:21), 21.7 (10-22-22 @ 08:57)    [ ]PPSV2 < or = 30%  [ ]significant weight loss [ ]poor nutritional intake [ ]anasarca   [ ]Artificial Nutrition    REFERRALS:   [ ]Chaplaincy  [ ]Hospice  [ ]Child Life  [ ]Social Work  [ x]Case management [ ]Holistic Therapy

## 2023-01-17 NOTE — PROGRESS NOTE ADULT - PROBLEM SELECTOR PLAN 1
P/w 1mo progressive LLE pain and difficulty ambulating x1wk; likely in setting of known L femur metastatic disease  - XR L knee/hip/pelvis/femur neg acute fx  - pain control per palliative: 2.5 mg methadone q8h, oxycodone 10mg q4h PRN for mod pain, 15 mg q4h for severe pain  - bowel regimen while on opioids  - MRI 1/5 completed, no surgical intervention. Plan for RT 5 sessions through 1/20

## 2023-01-17 NOTE — PROGRESS NOTE ADULT - PROBLEM SELECTOR PLAN 4
Symptom management recommendations discussed with primary team   Thank you for allowing us to participate in your patient's care. Please page [phone] for any questions/concerns.

## 2023-01-17 NOTE — PROGRESS NOTE ADULT - PROBLEM SELECTOR PLAN 3
- Reviewed with patient that nausea possibly related to underlying neoplasm   - on PO Zofran 4mg q6 ATC  - Consider trial of Decadron 2mg BID for better symptom management of pain and nausea if no medical contradictions.  - IV Compazine PRN; encouraged patient to ask for prn doses when having nausea

## 2023-01-17 NOTE — PROVIDER CONTACT NOTE (OTHER) - BACKGROUND
Admitted for Pancreatic CA w/mets to other site
Mets to L spine & L femur
Left thigh is where patient is receiving radiation therapy
Mets to bone
Bone mets
Pt with PMH pancrest CA and mets to L spine, L femur, admitted for pain control

## 2023-01-17 NOTE — PROGRESS NOTE ADULT - ASSESSMENT
56 yo Sylheti-speaking F with PMH pancreatic CA (mets to L spine, L femur; s/p chemo with ambraxane/gemzar 12/27, radiation ?12/15; s/p Whipple in 2017), Type 2 DM, GERD, asthma, and HTN p/w LLE pain x 1mo with difficulty ambulating x1wk. Likely in the setting of known metastatic disease. XR showed lucency on the L femoral neck. Course c/b severe pain, N/V, constipation -- palliative following for pain recs. Ortho deferring surgical management of L femur until radiation completed. Rad-onc following, plan for 5 sessions of RT through 1/20. 56 yo Sylheti-speaking F with PMH pancreatic CA (mets to L spine, L femur; s/p chemo with abraxane/gemzar 12/27, radiation ?12/15; s/p Whipple in 2017), Type 2 DM, GERD, asthma, and HTN p/w LLE pain x 1mo with difficulty ambulating x1wk. Likely in the setting of known metastatic disease. XR showed lucency on the L femoral neck. Course c/b severe pain, N/V, constipation -- palliative following for pain recs. Ortho deferring surgical management of L femur until radiation completed. Rad-onc following, plan for 5 sessions of RT through 1/20.

## 2023-01-17 NOTE — PROGRESS NOTE ADULT - PROBLEM SELECTOR PLAN 3
- likely in setting of known malignancy (chronic disease) and recent chemotherapy  - now improving, normalized WBC and platelet count; anemia likely in setting of neoplastic disease  - continue to trend

## 2023-01-17 NOTE — PROGRESS NOTE ADULT - PROBLEM SELECTOR PLAN 2
- MRI - Metastatic lesions within the left femoral neck and superior articular facet of L5. The lesion in the left femoral neck likely places the patient at increased risk for pathologic fracture. Small left hip effusion.  - Ortho and Neurosurgery recommendations appreciated  - Rad/onc recommendations appreciated - plan for 5 fractions of RT ; RT to be completed by 1/20  - PO Methadone 2.5mg TID (QTC on 1/12 - 438)  - Consider trial of Decadron 2mg BID for better symptom management of pain and nausea if no medical contradictions.  - PO Gabapentin 300mg TID  - Increase to PO Oxy IR 15mg q4 PRN moderate pain (hold for hypotension, oversedation, respiratory depression)  - Increase to PO Oxy IR 20mg q4PRN severe pain (hold for hypotension, oversedation, respiratory depression)  - Encouraged patient to ask for prn doses if having pain.  - Bowel regimen while on opiates.  - Narcan PRN

## 2023-01-17 NOTE — PROGRESS NOTE ADULT - PROBLEM SELECTOR PLAN 2
- pt with hx pancreatic CA f/w Dr. Katty Navarro @ [facility]; known mets to L spine and L femur, on chemo (ambraxane and gemzar) last dose 12/27 and started radiation to L spine early December x1 session   - appreciate rad on, nsx and med onc recs   - plan for inpt RT as above  - oncology following - pt with hx pancreatic CA f/w Dr. Katty Navarro @ [facility]; known mets to L spine and L femur, on chemo (Abraxane and gemzar) last dose 12/27 and started radiation to L spine early December x1 session   - appreciate rad on, nsx and med onc recs   - plan for inpt RT as above  - oncology following

## 2023-01-18 ENCOUNTER — APPOINTMENT (OUTPATIENT)
Dept: GERIATRICS | Facility: CLINIC | Age: 58
End: 2023-01-18

## 2023-01-18 LAB
ANION GAP SERPL CALC-SCNC: 10 MMOL/L — SIGNIFICANT CHANGE UP (ref 7–14)
BUN SERPL-MCNC: 10 MG/DL — SIGNIFICANT CHANGE UP (ref 7–23)
CALCIUM SERPL-MCNC: 9.9 MG/DL — SIGNIFICANT CHANGE UP (ref 8.4–10.5)
CHLORIDE SERPL-SCNC: 99 MMOL/L — SIGNIFICANT CHANGE UP (ref 98–107)
CO2 SERPL-SCNC: 25 MMOL/L — SIGNIFICANT CHANGE UP (ref 22–31)
CREAT SERPL-MCNC: 0.53 MG/DL — SIGNIFICANT CHANGE UP (ref 0.5–1.3)
EGFR: 108 ML/MIN/1.73M2 — SIGNIFICANT CHANGE UP
GLUCOSE BLDC GLUCOMTR-MCNC: 109 MG/DL — HIGH (ref 70–99)
GLUCOSE BLDC GLUCOMTR-MCNC: 163 MG/DL — HIGH (ref 70–99)
GLUCOSE BLDC GLUCOMTR-MCNC: 238 MG/DL — HIGH (ref 70–99)
GLUCOSE BLDC GLUCOMTR-MCNC: 278 MG/DL — HIGH (ref 70–99)
GLUCOSE SERPL-MCNC: 127 MG/DL — HIGH (ref 70–99)
HCT VFR BLD CALC: 31 % — LOW (ref 34.5–45)
HGB BLD-MCNC: 10 G/DL — LOW (ref 11.5–15.5)
MAGNESIUM SERPL-MCNC: 1.9 MG/DL — SIGNIFICANT CHANGE UP (ref 1.6–2.6)
MCHC RBC-ENTMCNC: 24.9 PG — LOW (ref 27–34)
MCHC RBC-ENTMCNC: 32.3 GM/DL — SIGNIFICANT CHANGE UP (ref 32–36)
MCV RBC AUTO: 77.3 FL — LOW (ref 80–100)
NRBC # BLD: 0 /100 WBCS — SIGNIFICANT CHANGE UP (ref 0–0)
NRBC # FLD: 0 K/UL — SIGNIFICANT CHANGE UP (ref 0–0)
PHOSPHATE SERPL-MCNC: 3.9 MG/DL — SIGNIFICANT CHANGE UP (ref 2.5–4.5)
PLATELET # BLD AUTO: 164 K/UL — SIGNIFICANT CHANGE UP (ref 150–400)
POTASSIUM SERPL-MCNC: 4.5 MMOL/L — SIGNIFICANT CHANGE UP (ref 3.5–5.3)
POTASSIUM SERPL-SCNC: 4.5 MMOL/L — SIGNIFICANT CHANGE UP (ref 3.5–5.3)
RBC # BLD: 4.01 M/UL — SIGNIFICANT CHANGE UP (ref 3.8–5.2)
RBC # FLD: 14.8 % — HIGH (ref 10.3–14.5)
SODIUM SERPL-SCNC: 134 MMOL/L — LOW (ref 135–145)
WBC # BLD: 4.49 K/UL — SIGNIFICANT CHANGE UP (ref 3.8–10.5)
WBC # FLD AUTO: 4.49 K/UL — SIGNIFICANT CHANGE UP (ref 3.8–10.5)

## 2023-01-18 PROCEDURE — 99233 SBSQ HOSP IP/OBS HIGH 50: CPT

## 2023-01-18 PROCEDURE — 99232 SBSQ HOSP IP/OBS MODERATE 35: CPT

## 2023-01-18 RX ORDER — METHADONE HYDROCHLORIDE 40 MG/1
5 TABLET ORAL EVERY 12 HOURS
Refills: 0 | Status: DISCONTINUED | OUTPATIENT
Start: 2023-01-18 | End: 2023-01-19

## 2023-01-18 RX ORDER — METHADONE HYDROCHLORIDE 40 MG/1
2.5 TABLET ORAL ONCE
Refills: 0 | Status: DISCONTINUED | OUTPATIENT
Start: 2023-01-18 | End: 2023-01-18

## 2023-01-18 RX ORDER — MORPHINE SULFATE 50 MG/1
2 CAPSULE, EXTENDED RELEASE ORAL ONCE
Refills: 0 | Status: DISCONTINUED | OUTPATIENT
Start: 2023-01-18 | End: 2023-01-18

## 2023-01-18 RX ADMIN — OXYCODONE HYDROCHLORIDE 20 MILLIGRAM(S): 5 TABLET ORAL at 02:49

## 2023-01-18 RX ADMIN — METHADONE HYDROCHLORIDE 2.5 MILLIGRAM(S): 40 TABLET ORAL at 14:27

## 2023-01-18 RX ADMIN — GABAPENTIN 300 MILLIGRAM(S): 400 CAPSULE ORAL at 05:57

## 2023-01-18 RX ADMIN — OXYCODONE HYDROCHLORIDE 20 MILLIGRAM(S): 5 TABLET ORAL at 13:01

## 2023-01-18 RX ADMIN — ONDANSETRON 4 MILLIGRAM(S): 8 TABLET, FILM COATED ORAL at 14:28

## 2023-01-18 RX ADMIN — GABAPENTIN 300 MILLIGRAM(S): 400 CAPSULE ORAL at 14:28

## 2023-01-18 RX ADMIN — Medication 5 MILLIGRAM(S): at 21:29

## 2023-01-18 RX ADMIN — MORPHINE SULFATE 2 MILLIGRAM(S): 50 CAPSULE, EXTENDED RELEASE ORAL at 21:58

## 2023-01-18 RX ADMIN — GABAPENTIN 300 MILLIGRAM(S): 400 CAPSULE ORAL at 21:29

## 2023-01-18 RX ADMIN — Medication 6: at 12:50

## 2023-01-18 RX ADMIN — Medication 2: at 08:59

## 2023-01-18 RX ADMIN — CHLORHEXIDINE GLUCONATE 1 APPLICATION(S): 213 SOLUTION TOPICAL at 11:57

## 2023-01-18 RX ADMIN — Medication 2 MILLIGRAM(S): at 17:40

## 2023-01-18 RX ADMIN — ONDANSETRON 4 MILLIGRAM(S): 8 TABLET, FILM COATED ORAL at 09:17

## 2023-01-18 RX ADMIN — OXYCODONE HYDROCHLORIDE 20 MILLIGRAM(S): 5 TABLET ORAL at 23:25

## 2023-01-18 RX ADMIN — Medication 400 UNIT(S): at 11:57

## 2023-01-18 RX ADMIN — Medication 1 TABLET(S): at 11:57

## 2023-01-18 RX ADMIN — Medication 3 MILLIGRAM(S): at 21:29

## 2023-01-18 RX ADMIN — SENNA PLUS 2 TABLET(S): 8.6 TABLET ORAL at 21:33

## 2023-01-18 RX ADMIN — MONTELUKAST 10 MILLIGRAM(S): 4 TABLET, CHEWABLE ORAL at 21:29

## 2023-01-18 RX ADMIN — OXYCODONE HYDROCHLORIDE 20 MILLIGRAM(S): 5 TABLET ORAL at 01:49

## 2023-01-18 RX ADMIN — ENOXAPARIN SODIUM 40 MILLIGRAM(S): 100 INJECTION SUBCUTANEOUS at 17:41

## 2023-01-18 RX ADMIN — METHADONE HYDROCHLORIDE 2.5 MILLIGRAM(S): 40 TABLET ORAL at 15:30

## 2023-01-18 RX ADMIN — OXYCODONE HYDROCHLORIDE 20 MILLIGRAM(S): 5 TABLET ORAL at 13:49

## 2023-01-18 RX ADMIN — Medication 2 MILLIGRAM(S): at 05:58

## 2023-01-18 RX ADMIN — PANTOPRAZOLE SODIUM 40 MILLIGRAM(S): 20 TABLET, DELAYED RELEASE ORAL at 05:57

## 2023-01-18 RX ADMIN — METHADONE HYDROCHLORIDE 2.5 MILLIGRAM(S): 40 TABLET ORAL at 05:58

## 2023-01-18 RX ADMIN — MORPHINE SULFATE 2 MILLIGRAM(S): 50 CAPSULE, EXTENDED RELEASE ORAL at 20:58

## 2023-01-18 RX ADMIN — ONDANSETRON 4 MILLIGRAM(S): 8 TABLET, FILM COATED ORAL at 20:58

## 2023-01-18 NOTE — PROGRESS NOTE ADULT - PROBLEM SELECTOR PLAN 2
- MRI - Metastatic lesions within the left femoral neck and superior articular facet of L5. The lesion in the left femoral neck likely places the patient at increased risk for pathologic fracture. Small left hip effusion.  - Ortho and Neurosurgery recommendations appreciated  - Rad/onc recommendations appreciated - plan for 5 fractions of RT ; RT to be completed by 1/23  - Increase to PO Methadone 5mg TID (QTC on 1/12 - 438)  - IV Decadron 2mg BID   - PO Gabapentin 300mg TID  - PO Oxy IR 15mg q4 PRN moderate pain (hold for hypotension, oversedation, respiratory depression)  - PO Oxy IR 20mg q4PRN severe pain (hold for hypotension, oversedation, respiratory depression)  - Encouraged patient to ask for prn doses if having pain.  - Bowel regimen while on opiates.  - Narcan PRN - MRI - Metastatic lesions within the left femoral neck and superior articular facet of L5. The lesion in the left femoral neck likely places the patient at increased risk for pathologic fracture. Small left hip effusion.  - Ortho and Neurosurgery recommendations appreciated  - Rad/onc recommendations appreciated - plan for 5 fractions of RT ; RT to be completed by 1/23  - Increase to PO Methadone 5mg BID (QTC on 1/12 - 438)  - IV Decadron 2mg BID   - PO Gabapentin 300mg TID  - PO Oxy IR 15mg q4 PRN moderate pain (hold for hypotension, oversedation, respiratory depression)  - PO Oxy IR 20mg q4PRN severe pain (hold for hypotension, oversedation, respiratory depression)  - Encouraged patient to ask for prn doses if having pain.  - Bowel regimen while on opiates.  - Narcan PRN

## 2023-01-18 NOTE — PROGRESS NOTE ADULT - PROBLEM SELECTOR PLAN 2
- pt with hx pancreatic CA f/w Dr. Katty Navarro @ [facility]; known mets to L spine and L femur, on chemo (Abraxane and gemzar) last dose 12/27 and started radiation to L spine early December x1 session   - appreciate rad on, nsx and med onc recs   - plan for inpt RT as above  - oncology following

## 2023-01-18 NOTE — PROGRESS NOTE ADULT - SUBJECTIVE AND OBJECTIVE BOX
SUBJECTIVE AND OBJECTIVE:  Patient seen and examined at bedside. Patient being followed up for pain.   [facility] : [phone].   Patient states leg pain still has been severe. States nausea has improved today     INTERVAL HPI/OVERNIGHT EVENTS:  Received 2 doses of the Oxy IR 15mg and 2 doses of PO Oxy IR 20mg in past 24 hours.     Allergies    No Known Allergies    Intolerances    MEDICATIONS  (STANDING):  bisacodyl 5 milliGRAM(s) Oral at bedtime  chlorhexidine 2% Cloths 1 Application(s) Topical daily  cholecalciferol 400 Unit(s) Oral daily  dexAMETHasone  Injectable 2 milliGRAM(s) IV Push two times a day  dextrose 5%. 1000 milliLiter(s) (50 mL/Hr) IV Continuous <Continuous>  dextrose 5%. 1000 milliLiter(s) (100 mL/Hr) IV Continuous <Continuous>  dextrose 50% Injectable 25 Gram(s) IV Push once  dextrose 50% Injectable 12.5 Gram(s) IV Push once  dextrose 50% Injectable 25 Gram(s) IV Push once  enoxaparin Injectable 40 milliGRAM(s) SubCutaneous every 24 hours  gabapentin 300 milliGRAM(s) Oral three times a day  glucagon  Injectable 1 milliGRAM(s) IntraMuscular once  influenza   Vaccine 0.5 milliLiter(s) IntraMuscular once  insulin lispro (ADMELOG) corrective regimen sliding scale   SubCutaneous three times a day before meals  insulin lispro (ADMELOG) corrective regimen sliding scale   SubCutaneous at bedtime  melatonin 3 milliGRAM(s) Oral at bedtime  methadone    Tablet 5 milliGRAM(s) Oral every 12 hours  montelukast 10 milliGRAM(s) Oral at bedtime  multivitamin 1 Tablet(s) Oral daily  ondansetron   Disintegrating Tablet 4 milliGRAM(s) Oral every 6 hours  pantoprazole    Tablet 40 milliGRAM(s) Oral before breakfast  polyethylene glycol 3350 17 Gram(s) Oral two times a day  senna 2 Tablet(s) Oral at bedtime    MEDICATIONS  (PRN):  acetaminophen     Tablet .. 650 milliGRAM(s) Oral every 6 hours PRN Temp greater or equal to 38C (100.4F), Mild Pain (1 - 3)  dextrose Oral Gel 15 Gram(s) Oral once PRN Blood Glucose LESS THAN 70 milliGRAM(s)/deciliter  meclizine 12.5 milliGRAM(s) Oral every 6 hours PRN Dizziness  oxyCODONE    IR 20 milliGRAM(s) Oral every 4 hours PRN Severe Pain (7 - 10)  oxyCODONE    IR 15 milliGRAM(s) Oral every 4 hours PRN Moderate Pain (4 - 6)  prochlorperazine   Injectable 5 milliGRAM(s) IV Push every 8 hours PRN refractory nausea      ITEMS UNCHECKED ARE NOT PRESENT    PRESENT SYMPTOMS: [ ]Unable to self-report due to altered mental status- see [ ] CPOT [ ] PAINADS [ ] RDOS  Source if other than patient:  [ ]Family   [ ]Team     Pain: [ x]yes [ ]no  QOL impact - severe  Location - left thigh                     Aggravating factors - movement   Quality - tingling   Radiation - left lower extremity and lower back  Timing- constant with intermittent worsening episodes   Severity (0-10 scale): 10  Minimal acceptable level / Pain Goal (0-10 scale):  7-8    Dyspnea:                           [ ]Mild [ ]Moderate [ ]Severe  Anxiety:                             [ ]Mild [ ]Moderate [ ]Severe  Agitation:                          [ ]Mild [ ]Moderate [ ]Severe  Fatigue:                             [ ]Mild [ ]Moderate [ ]Severe  Nausea:                             [x ]Mild [ ]Moderate [ ]Severe  Loss of appetite:              [x ]Mild [ ]Moderate [ ]Severe  Constipation:                   [ ]Mild [ ]Moderate [ ]Severe  Diarrhea:                          [ ]Mild [ ]Moderate [ ]Severe    CPOT:    https://www.[email or web address]/getattachment/[email or web address]/Critical-Care-Pain-Observation-Tool-(CPOT)    PCSSQ[Palliative Care Spiritual Screening Question]   Severity (0-10):  Score of 4 or > indicate consideration of Chaplaincy referral.  Chaplaincy Referral: [ ] yes [ ] refused [ ] following [x ] deferred    Caregiver [place]? : [ ] yes [ ] no [ ] Declined [x ] Deferred              Social work referral [ ] Patient & Family Centered Care Referral [ ]     Anticipatory Grief present?:  [x ] yes [ ] no  [ ] Deferred                  Social work referral [x ] Chaplaincy Referral[ ]    Other Symptoms:  [ x]All other review of systems negative     PHYSICAL EXAM:  Vital Signs Last 24 Hrs  T(C): 36.7 (18 Jan 2023 05:00), Max: 36.7 (17 Jan 2023 21:37)  T(F): 98 (18 Jan 2023 05:00), Max: 98.1 (17 Jan 2023 21:37)  HR: 67 (18 Jan 2023 05:00) (67 - 76)  BP: 100/60 (18 Jan 2023 05:00) (100/60 - 117/64)  BP(mean): --  RR: 17 (18 Jan 2023 05:00) (17 - 18)  SpO2: 97% (18 Jan 2023 05:00) (97% - 99%)    Parameters below as of 18 Jan 2023 05:00  Patient On (Oxygen Delivery Method): room air      GENERAL:  [x ]Alert  [x ]Oriented x 3  [ ]Lethargic  [ ]Cachexia  [ ]Unarousable  [x ]Verbal  [ ]Non-Verbal    Behavioral:   [ ] Anxiety  [ ] Delirium [ ] Agitation [ x] Calm  [ ] Other  HEENT:  [ x]Normal  [ ] Temporal Wasting  [ ]Dry mouth   [ ]ET Tube/Trach  [ ]Oral lesions  [ ] Mucositis  PULMONARY:   [ x]Clear [ ]Tachypnea  [ ]Audible excessive secretions   [ ]Rhonchi        [ ]Right [ ]Left [ ]Bilateral  [ ]Crackles        [ ]Right [ ]Left [ ]Bilateral  [ ]Wheezing     [ ]Right [ ]Left [ ]Bilateral  [ ]Diminished breath sounds [ ]right [ ]left [ ]bilateral  CARDIOVASCULAR:    [ x]Regular [ ]Irregular [ ]Tachy  [ ]Dmitry [ ]Murmur [ ]Other  GASTROINTESTINAL:  [ x]Soft  [ ]Distended   [ ]+BS  [x ]Non tender [ ]Tender  [ ]PEG [ ]OGT/ NGT  Last BM: 1/17  GENITOURINARY:   [x ]Normal [ ] Incontinent   [ ]Oliguria/Anuria   [ ]Swanson  MUSCULOSKELETAL:   [x ]Normal , except left lower extremity movement limited due to pain  [x ]Weakness  [ ]Bed/Wheelchair bound [ ]Edema  [  ] amputation  [  ] contraction  NEUROLOGIC:   [ x]No focal deficits  [ ]Cognitive impairment  [ ]Dysphagia [ ]Dysarthria [ ]Paresis [ ]Other   SKIN: See Nursing Skin Assessment for further details  [x ]Normal    [ ]Rash  [ ]Pressure ulcer(s)       Present on admission [ ]y [ ]n   [  ]  Wound    [  ] hyperpigmentation      CRITICAL CARE:  [ ]Shock Present  [ ]Septic [ ]Cardiogenic [ ]Neurologic [ ]Hypovolemic  [ ]Vasopressors [ ]Inotropes  [ ]Respiratory failure present [ ]Mechanical Ventilation [ ]Non-invasive ventilatory support [ ]High-Flow   [ ]Acute  [ ]Chronic [ ]Hypoxic  [ ]Hypercarbic [ ]Other  [ ]Other organ failure     LABS:                          10.0   4.49  )-----------( 164      ( 18 Jan 2023 06:30 )             31.0       01-18    134<L>  |  99  |  10  ----------------------------<  127<H>  4.5   |  25  |  0.53    Ca    9.9      18 Jan 2023 06:30  Phos  3.9     01-18  Mg     1.90     01-18      RADIOLOGY & ADDITIONAL STUDIES: reviewed     Protein Calorie Malnutrition Present: [ ]mild [ ]moderate [ ]severe [ ]underweight [ ]morbid obesity  https://www.andeal.org/vault/2440/web/files/ONC/Table_Clinical%20Characteristics%20to%20Document%20Malnutrition-White%20JV%20et%20al%202012.pdf    Height (cm): 149 (12-29-22 @ 22:41), 149 (12-27-22 @ 16:21), 147.3 (10-22-22 @ 08:57)  Weight (kg): 43.5 (12-27-22 @ 16:21), 47 (10-22-22 @ 08:57), 45.9 (07-20-22 @ 11:51)  BMI (kg/m2): 19.6 (12-29-22 @ 22:41), 19.6 (12-27-22 @ 16:21), 21.7 (10-22-22 @ 08:57)    [ ]PPSV2 < or = 30%  [ ]significant weight loss [ ]poor nutritional intake [ ]anasarca   [ ]Artificial Nutrition    REFERRALS:   [ ]Chaplaincy  [ ]Hospice  [ ]Child Life  [ ]Social Work  [ x]Case management [ ]Holistic Therapy

## 2023-01-18 NOTE — PROGRESS NOTE ADULT - PROBLEM SELECTOR PLAN 3
- Reviewed with patient that nausea possibly related to underlying neoplasm   - Nausea improving today after initiation of decadron   - PO Zofran 4mg q6 ATC  - IV Decadron 2mg BID  - IV Compazine PRN; encouraged patient to ask for prn doses when having nausea

## 2023-01-18 NOTE — PROGRESS NOTE ADULT - ASSESSMENT
58 yo Sylheti-speaking F with PMH pancreatic CA (mets to L spine, L femur; s/p chemo with abraxane/gemzar 12/27, radiation ?12/15; s/p Whipple in 2017), Type 2 DM, GERD, asthma, and HTN p/w LLE pain x 1mo with difficulty ambulating x1wk. Likely in the setting of known metastatic disease. XR showed lucency on the L femoral neck. Course c/b severe pain, N/V, constipation -- palliative following for pain recs. Ortho deferring surgical management of L femur until radiation completed. Rad-onc following, plan for 5 sessions of RT through 1/20.

## 2023-01-18 NOTE — PROGRESS NOTE ADULT - PROBLEM SELECTOR PLAN 4
Symptom management recommendations discussed with primary team   Thank you for allowing us to participate in your patient's care. Please page [phone] for any questions/concerns.

## 2023-01-18 NOTE — PROGRESS NOTE ADULT - SUBJECTIVE AND OBJECTIVE BOX
Salome Hayward  [facility] Medicine  Pager #[phone]  Available on [employer] Teams    Patient is a 58y old  Female who presents with a chief complaint of Difficulty ambulating (17 Jan 2023 16:42)      SUBJECTIVE / OVERNIGHT EVENTS: Patient seen and examined at bedside. Pt called her daughter Olegario for translation. Pt reports pain is slightly better today on the higher dose of oxycodone. States she did not go down for radiation yesterday.    ADDITIONAL REVIEW OF SYSTEMS:    MEDICATIONS  (STANDING):  bisacodyl 5 milliGRAM(s) Oral at bedtime  chlorhexidine 2% Cloths 1 Application(s) Topical daily  cholecalciferol 400 Unit(s) Oral daily  dexAMETHasone  Injectable 2 milliGRAM(s) IV Push two times a day  dextrose 5%. 1000 milliLiter(s) (100 mL/Hr) IV Continuous <Continuous>  dextrose 5%. 1000 milliLiter(s) (50 mL/Hr) IV Continuous <Continuous>  dextrose 50% Injectable 25 Gram(s) IV Push once  dextrose 50% Injectable 12.5 Gram(s) IV Push once  dextrose 50% Injectable 25 Gram(s) IV Push once  enoxaparin Injectable 40 milliGRAM(s) SubCutaneous every 24 hours  gabapentin 300 milliGRAM(s) Oral three times a day  glucagon  Injectable 1 milliGRAM(s) IntraMuscular once  influenza   Vaccine 0.5 milliLiter(s) IntraMuscular once  insulin lispro (ADMELOG) corrective regimen sliding scale   SubCutaneous at bedtime  insulin lispro (ADMELOG) corrective regimen sliding scale   SubCutaneous three times a day before meals  melatonin 3 milliGRAM(s) Oral at bedtime  methadone    Tablet 2.5 milliGRAM(s) Oral every 8 hours  montelukast 10 milliGRAM(s) Oral at bedtime  multivitamin 1 Tablet(s) Oral daily  ondansetron   Disintegrating Tablet 4 milliGRAM(s) Oral every 6 hours  pantoprazole    Tablet 40 milliGRAM(s) Oral before breakfast  polyethylene glycol 3350 17 Gram(s) Oral two times a day  senna 2 Tablet(s) Oral at bedtime    MEDICATIONS  (PRN):  acetaminophen     Tablet .. 650 milliGRAM(s) Oral every 6 hours PRN Temp greater or equal to 38C (100.4F), Mild Pain (1 - 3)  dextrose Oral Gel 15 Gram(s) Oral once PRN Blood Glucose LESS THAN 70 milliGRAM(s)/deciliter  meclizine 12.5 milliGRAM(s) Oral every 6 hours PRN Dizziness  oxyCODONE    IR 15 milliGRAM(s) Oral every 4 hours PRN Moderate Pain (4 - 6)  oxyCODONE    IR 20 milliGRAM(s) Oral every 4 hours PRN Severe Pain (7 - 10)  prochlorperazine   Injectable 5 milliGRAM(s) IV Push every 8 hours PRN refractory nausea      CAPILLARY BLOOD GLUCOSE      POCT Blood Glucose.: 163 mg/dL (18 Jan 2023 08:40)  POCT Blood Glucose.: 269 mg/dL (17 Jan 2023 22:31)  POCT Blood Glucose.: 147 mg/dL (17 Jan 2023 17:44)  POCT Blood Glucose.: 147 mg/dL (17 Jan 2023 12:27)    I&O's Summary      PHYSICAL EXAM:    Vital Signs Last 24 Hrs  T(C): 36.7 (18 Jan 2023 05:00), Max: 37 (17 Jan 2023 13:00)  T(F): 98 (18 Jan 2023 05:00), Max: 98.6 (17 Jan 2023 13:00)  HR: 67 (18 Jan 2023 05:00) (67 - 76)  BP: 100/60 (18 Jan 2023 05:00) (100/60 - 117/64)  BP(mean): --  RR: 17 (18 Jan 2023 05:00) (17 - 18)  SpO2: 97% (18 Jan 2023 05:00) (96% - 99%)    Parameters below as of 18 Jan 2023 05:00  Patient On (Oxygen Delivery Method): room air    CONSTITUTIONAL: NAD, thin  EYES: Conjunctiva and sclera clear  ENMT: Moist oral mucosa  RESPIRATORY: Normal respiratory effort; lungs are clear to auscultation bilaterally  CARDIOVASCULAR: Regular rate and rhythm, normal S1 and S2, no murmur/rub/gallop; No lower extremity edema  ABDOMEN: Soft, nontender to palpation, normoactive bowel sounds  MUSCULOSKELETAL: L lateral hip area tender to palpation, ROM limited 2/2 pain  PSYCH: A+O to person, place, and time; affect appropriate  SKIN: No rashes; no palpable lesions    LABS:                        10.0   4.49  )-----------( 164      ( 18 Jan 2023 06:30 )             31.0     01-18    134<L>  |  99  |  10  ----------------------------<  127<H>  4.5   |  25  |  0.53    Ca    9.9      18 Jan 2023 06:30  Phos  3.9     01-18  Mg     1.90     01-18    RADIOLOGY & ADDITIONAL TESTS: No new imaging  Results Reviewed: Yes  Imaging Personally Reviewed:  Electrocardiogram Personally Reviewed:    COORDINATION OF CARE:  Care Discussed with Consultants/Other Providers [Y/N]:  Prior or Outpatient Records Reviewed [Y/N]:

## 2023-01-19 LAB
GLUCOSE BLDC GLUCOMTR-MCNC: 157 MG/DL — HIGH (ref 70–99)
GLUCOSE BLDC GLUCOMTR-MCNC: 185 MG/DL — HIGH (ref 70–99)
GLUCOSE BLDC GLUCOMTR-MCNC: 198 MG/DL — HIGH (ref 70–99)
GLUCOSE BLDC GLUCOMTR-MCNC: 204 MG/DL — HIGH (ref 70–99)

## 2023-01-19 PROCEDURE — 99233 SBSQ HOSP IP/OBS HIGH 50: CPT

## 2023-01-19 PROCEDURE — 99232 SBSQ HOSP IP/OBS MODERATE 35: CPT

## 2023-01-19 RX ORDER — MORPHINE SULFATE 50 MG/1
2 CAPSULE, EXTENDED RELEASE ORAL ONCE
Refills: 0 | Status: DISCONTINUED | OUTPATIENT
Start: 2023-01-19 | End: 2023-01-19

## 2023-01-19 RX ORDER — ONDANSETRON 8 MG/1
4 TABLET, FILM COATED ORAL EVERY 8 HOURS
Refills: 0 | Status: DISCONTINUED | OUTPATIENT
Start: 2023-01-19 | End: 2023-02-03

## 2023-01-19 RX ORDER — DEXAMETHASONE 0.5 MG/5ML
4 ELIXIR ORAL
Refills: 0 | Status: COMPLETED | OUTPATIENT
Start: 2023-01-19 | End: 2023-01-24

## 2023-01-19 RX ORDER — METHADONE HYDROCHLORIDE 40 MG/1
5 TABLET ORAL EVERY 12 HOURS
Refills: 0 | Status: DISCONTINUED | OUTPATIENT
Start: 2023-01-19 | End: 2023-01-23

## 2023-01-19 RX ORDER — OXYCODONE HYDROCHLORIDE 5 MG/1
20 TABLET ORAL EVERY 4 HOURS
Refills: 0 | Status: DISCONTINUED | OUTPATIENT
Start: 2023-01-19 | End: 2023-01-25

## 2023-01-19 RX ORDER — OXYCODONE HYDROCHLORIDE 5 MG/1
15 TABLET ORAL EVERY 4 HOURS
Refills: 0 | Status: DISCONTINUED | OUTPATIENT
Start: 2023-01-19 | End: 2023-01-25

## 2023-01-19 RX ADMIN — Medication 5 MILLIGRAM(S): at 22:14

## 2023-01-19 RX ADMIN — OXYCODONE HYDROCHLORIDE 20 MILLIGRAM(S): 5 TABLET ORAL at 13:30

## 2023-01-19 RX ADMIN — MONTELUKAST 10 MILLIGRAM(S): 4 TABLET, CHEWABLE ORAL at 22:13

## 2023-01-19 RX ADMIN — OXYCODONE HYDROCHLORIDE 20 MILLIGRAM(S): 5 TABLET ORAL at 22:12

## 2023-01-19 RX ADMIN — GABAPENTIN 300 MILLIGRAM(S): 400 CAPSULE ORAL at 07:10

## 2023-01-19 RX ADMIN — ONDANSETRON 4 MILLIGRAM(S): 8 TABLET, FILM COATED ORAL at 14:03

## 2023-01-19 RX ADMIN — OXYCODONE HYDROCHLORIDE 20 MILLIGRAM(S): 5 TABLET ORAL at 12:26

## 2023-01-19 RX ADMIN — OXYCODONE HYDROCHLORIDE 20 MILLIGRAM(S): 5 TABLET ORAL at 05:42

## 2023-01-19 RX ADMIN — Medication 400 UNIT(S): at 11:48

## 2023-01-19 RX ADMIN — SENNA PLUS 2 TABLET(S): 8.6 TABLET ORAL at 22:14

## 2023-01-19 RX ADMIN — PANTOPRAZOLE SODIUM 40 MILLIGRAM(S): 20 TABLET, DELAYED RELEASE ORAL at 07:10

## 2023-01-19 RX ADMIN — Medication 1 TABLET(S): at 11:48

## 2023-01-19 RX ADMIN — OXYCODONE HYDROCHLORIDE 20 MILLIGRAM(S): 5 TABLET ORAL at 00:25

## 2023-01-19 RX ADMIN — OXYCODONE HYDROCHLORIDE 20 MILLIGRAM(S): 5 TABLET ORAL at 23:12

## 2023-01-19 RX ADMIN — OXYCODONE HYDROCHLORIDE 20 MILLIGRAM(S): 5 TABLET ORAL at 04:42

## 2023-01-19 RX ADMIN — METHADONE HYDROCHLORIDE 5 MILLIGRAM(S): 40 TABLET ORAL at 07:19

## 2023-01-19 RX ADMIN — GABAPENTIN 300 MILLIGRAM(S): 400 CAPSULE ORAL at 22:13

## 2023-01-19 RX ADMIN — Medication 2: at 09:00

## 2023-01-19 RX ADMIN — Medication 2: at 18:15

## 2023-01-19 RX ADMIN — Medication 2 MILLIGRAM(S): at 07:20

## 2023-01-19 RX ADMIN — METHADONE HYDROCHLORIDE 5 MILLIGRAM(S): 40 TABLET ORAL at 18:15

## 2023-01-19 RX ADMIN — GABAPENTIN 300 MILLIGRAM(S): 400 CAPSULE ORAL at 14:03

## 2023-01-19 RX ADMIN — Medication 3 MILLIGRAM(S): at 22:13

## 2023-01-19 RX ADMIN — ONDANSETRON 4 MILLIGRAM(S): 8 TABLET, FILM COATED ORAL at 22:12

## 2023-01-19 RX ADMIN — ENOXAPARIN SODIUM 40 MILLIGRAM(S): 100 INJECTION SUBCUTANEOUS at 17:18

## 2023-01-19 RX ADMIN — MORPHINE SULFATE 2 MILLIGRAM(S): 50 CAPSULE, EXTENDED RELEASE ORAL at 09:51

## 2023-01-19 RX ADMIN — Medication 2: at 12:58

## 2023-01-19 RX ADMIN — ONDANSETRON 4 MILLIGRAM(S): 8 TABLET, FILM COATED ORAL at 09:01

## 2023-01-19 RX ADMIN — CHLORHEXIDINE GLUCONATE 1 APPLICATION(S): 213 SOLUTION TOPICAL at 11:48

## 2023-01-19 RX ADMIN — Medication 4 MILLIGRAM(S): at 17:18

## 2023-01-19 NOTE — PROGRESS NOTE ADULT - ASSESSMENT
56 yo Sylheti-speaking F with PMH pancreatic CA (mets to L spine, L femur; s/p chemo with abraxane/gemzar 12/27, radiation ?12/15; s/p Whipple in 2017), Type 2 DM, GERD, asthma, and HTN p/w LLE pain x 1mo with difficulty ambulating x1wk. Likely in the setting of known metastatic disease. XR showed lucency on the L femoral neck. Course c/b severe pain, N/V, constipation -- palliative following for pain recs. Ortho deferring surgical management of L femur until radiation completed. Rad-onc following, plan for 5 sessions of RT through 1/23.

## 2023-01-19 NOTE — PROGRESS NOTE ADULT - SUBJECTIVE AND OBJECTIVE BOX
SUBJECTIVE AND OBJECTIVE:  Patient seen and examined at bedside. Patient being followed up for pain.   Patient declined Latvian ; preferred for daughter at bedside to translate.   Patient states leg pain has been worse since starting Radiation treatments. She is concerned about her debility.   Denies further nausea/emesis. Denies constipation.     INTERVAL HPI/OVERNIGHT EVENTS:  Received 3 doses of the Oxy IR 20mg and 1 dose of IV Morphine 2mg in past 24 hours.     Allergies    No Known Allergies    Intolerances    MEDICATIONS  (STANDING):  bisacodyl 5 milliGRAM(s) Oral at bedtime  chlorhexidine 2% Cloths 1 Application(s) Topical daily  cholecalciferol 400 Unit(s) Oral daily  dexAMETHasone  Injectable 4 milliGRAM(s) IV Push two times a day  dextrose 5%. 1000 milliLiter(s) (50 mL/Hr) IV Continuous <Continuous>  dextrose 5%. 1000 milliLiter(s) (100 mL/Hr) IV Continuous <Continuous>  dextrose 50% Injectable 25 Gram(s) IV Push once  dextrose 50% Injectable 25 Gram(s) IV Push once  dextrose 50% Injectable 12.5 Gram(s) IV Push once  enoxaparin Injectable 40 milliGRAM(s) SubCutaneous every 24 hours  gabapentin 300 milliGRAM(s) Oral three times a day  glucagon  Injectable 1 milliGRAM(s) IntraMuscular once  influenza   Vaccine 0.5 milliLiter(s) IntraMuscular once  insulin lispro (ADMELOG) corrective regimen sliding scale   SubCutaneous three times a day before meals  insulin lispro (ADMELOG) corrective regimen sliding scale   SubCutaneous at bedtime  melatonin 3 milliGRAM(s) Oral at bedtime  methadone    Tablet 5 milliGRAM(s) Oral every 12 hours  montelukast 10 milliGRAM(s) Oral at bedtime  multivitamin 1 Tablet(s) Oral daily  ondansetron   Disintegrating Tablet 4 milliGRAM(s) Oral every 6 hours  pantoprazole    Tablet 40 milliGRAM(s) Oral before breakfast  polyethylene glycol 3350 17 Gram(s) Oral two times a day  senna 2 Tablet(s) Oral at bedtime    MEDICATIONS  (PRN):  acetaminophen     Tablet .. 650 milliGRAM(s) Oral every 6 hours PRN Temp greater or equal to 38C (100.4F), Mild Pain (1 - 3)  dextrose Oral Gel 15 Gram(s) Oral once PRN Blood Glucose LESS THAN 70 milliGRAM(s)/deciliter  meclizine 12.5 milliGRAM(s) Oral every 6 hours PRN Dizziness  oxyCODONE    IR 15 milliGRAM(s) Oral every 4 hours PRN Moderate Pain (4 - 6)  oxyCODONE    IR 20 milliGRAM(s) Oral every 4 hours PRN Severe Pain (7 - 10)  prochlorperazine   Injectable 5 milliGRAM(s) IV Push every 8 hours PRN refractory nausea    ITEMS UNCHECKED ARE NOT PRESENT    PRESENT SYMPTOMS: [ ]Unable to self-report due to altered mental status- see [ ] CPOT [ ] PAINADS [ ] RDOS  Source if other than patient:  [ ]Family   [ ]Team     Pain: [ x]yes [ ]no  QOL impact - severe  Location - left thigh                     Aggravating factors - movement   Quality - tingling   Radiation - left lower extremity and lower back  Timing- constant with intermittent worsening episodes   Severity (0-10 scale): 10  Minimal acceptable level / Pain Goal (0-10 scale):  7-8    Dyspnea:                           [ ]Mild [ ]Moderate [ ]Severe  Anxiety:                             [ ]Mild [ ]Moderate [ ]Severe  Agitation:                          [ ]Mild [ ]Moderate [ ]Severe  Fatigue:                             [ ]Mild [ ]Moderate [ ]Severe  Nausea:                             [ ]Mild [ ]Moderate [ ]Severe  Loss of appetite:              [ ]Mild [ ]Moderate [ ]Severe  Constipation:                   [ ]Mild [ ]Moderate [ ]Severe  Diarrhea:                          [ ]Mild [ ]Moderate [ ]Severe    CPOT:    https://[email or web address]/[email or web address]/Critical-Care-Pain-Observation-Tool-(CPOT)    PCSSQ[Palliative Care Spiritual Screening Question]   Severity (0-10):  Score of 4 or > indicate consideration of Chaplaincy referral.  Chaplaincy Referral: [ ] yes [ ] refused [ ] following [x ] deferred    Caregiver [place]? : [ ] yes [ ] no [ ] Declined [x ] Deferred              Social work referral [ ] Patient & Family Centered Care Referral [ ]     Anticipatory Grief present?:  [x ] yes [ ] no  [ ] Deferred                  Social work referral [x ] Chaplaincy Referral[ ]    Other Symptoms:  [ x]All other review of systems negative     PHYSICAL EXAM:  Vital Signs Last 24 Hrs  T(C): 36.2 (19 Jan 2023 11:54), Max: 37.3 (19 Jan 2023 06:58)  T(F): 97.2 (19 Jan 2023 11:54), Max: 99.1 (19 Jan 2023 06:58)  HR: 76 (19 Jan 2023 11:54) (74 - 82)  BP: 140/65 (19 Jan 2023 11:54) (112/63 - 140/65)  BP(mean): --  RR: 17 (19 Jan 2023 11:54) (17 - 17)  SpO2: 100% (19 Jan 2023 11:54) (96% - 100%)    Parameters below as of 19 Jan 2023 11:54  Patient On (Oxygen Delivery Method): room air      GENERAL:  [x ]Alert  [x ]Oriented x 3  [ ]Lethargic  [ ]Cachexia  [ ]Unarousable  [x ]Verbal  [ ]Non-Verbal    Behavioral:   [ ] Anxiety  [ ] Delirium [ ] Agitation [ x] Calm  [ ] Other  HEENT:  [ x]Normal  [ ] Temporal Wasting  [ ]Dry mouth   [ ]ET Tube/Trach  [ ]Oral lesions  [ ] Mucositis  PULMONARY:   [ x]Clear [ ]Tachypnea  [ ]Audible excessive secretions   [ ]Rhonchi        [ ]Right [ ]Left [ ]Bilateral  [ ]Crackles        [ ]Right [ ]Left [ ]Bilateral  [ ]Wheezing     [ ]Right [ ]Left [ ]Bilateral  [ ]Diminished breath sounds [ ]right [ ]left [ ]bilateral  CARDIOVASCULAR:    [ x]Regular [ ]Irregular [ ]Tachy  [ ]Dmitry [ ]Murmur [ ]Other  GASTROINTESTINAL:  [ x]Soft  [ ]Distended   [ ]+BS  [x ]Non tender [ ]Tender  [ ]PEG [ ]OGT/ NGT  Last BM: 1/17  GENITOURINARY:   [x ]Normal [ ] Incontinent   [ ]Oliguria/Anuria   [ ]Swanson  MUSCULOSKELETAL:   [ ]Normal   [x ]Weakness; left lower extremity movement limited due to pain  [ ]Bed/Wheelchair bound [ ]Edema  [  ] amputation  [  ] contraction  NEUROLOGIC:   [ x]No focal deficits  [ ]Cognitive impairment  [ ]Dysphagia [ ]Dysarthria [ ]Paresis [ ]Other   SKIN: See Nursing Skin Assessment for further details  [x ]Normal    [ ]Rash  [ ]Pressure ulcer(s)       Present on admission [ ]y [ ]n   [  ]  Wound    [  ] hyperpigmentation      CRITICAL CARE:  [ ]Shock Present  [ ]Septic [ ]Cardiogenic [ ]Neurologic [ ]Hypovolemic  [ ]Vasopressors [ ]Inotropes  [ ]Respiratory failure present [ ]Mechanical Ventilation [ ]Non-invasive ventilatory support [ ]High-Flow   [ ]Acute  [ ]Chronic [ ]Hypoxic  [ ]Hypercarbic [ ]Other  [ ]Other organ failure     LABS:                                            10.0   4.49  )-----------( 164      ( 18 Jan 2023 06:30 )             31.0       01-18    134<L>  |  99  |  10  ----------------------------<  127<H>  4.5   |  25  |  0.53    Ca    9.9      18 Jan 2023 06:30  Phos  3.9     01-18  Mg     1.90     01-18      RADIOLOGY & ADDITIONAL STUDIES: reviewed     Protein Calorie Malnutrition Present: [ ]mild [ ]moderate [ ]severe [ ]underweight [ ]morbid obesity  https://www.andeal.org/vault/2440/web/files/ONC/Table_Clinical%20Characteristics%20to%20Document%20Malnutrition-White%20JV%20et%20al%202012.pdf    Height (cm): 149 (12-29-22 @ 22:41), 149 (12-27-22 @ 16:21), 147.3 (10-22-22 @ 08:57)  Weight (kg): 43.5 (12-27-22 @ 16:21), 47 (10-22-22 @ 08:57), 45.9 (07-20-22 @ 11:51)  BMI (kg/m2): 19.6 (12-29-22 @ 22:41), 19.6 (12-27-22 @ 16:21), 21.7 (10-22-22 @ 08:57)    [ ]PPSV2 < or = 30%  [ ]significant weight loss [ ]poor nutritional intake [ ]anasarca   [ ]Artificial Nutrition    REFERRALS:   [ ]Chaplaincy  [ ]Hospice  [ ]Child Life  [ ]Social Work  [ x]Case management [ ]Holistic Therapy

## 2023-01-19 NOTE — PROGRESS NOTE ADULT - PROBLEM SELECTOR PLAN 2
- pt with hx pancreatic CA f/w Dr. Katty Navarro @ [facility]; known mets to L spine and L femur, on chemo (Abraxane and gemzar) last dose 12/27 and started radiation to L spine early December x1 session   - plan for inpt RT as above  - oncology following

## 2023-01-19 NOTE — PROGRESS NOTE ADULT - PROBLEM SELECTOR PLAN 5
- home meds: Januvia 100mg, metformin 500mg BID  - 10/2022 A1C 7.4%, well controlled  - moderate ISS  - monitor FS qac and qHS  - started on steroids- monitor for steroid-induced hyperglycemia

## 2023-01-19 NOTE — PROGRESS NOTE ADULT - PROBLEM SELECTOR PLAN 2
- Pain uncontrolled  - MRI - Metastatic lesions within the left femoral neck and superior articular facet of L5. The lesion in the left femoral neck likely places the patient at increased risk for pathologic fracture. Small left hip effusion.  - Ortho and Neurosurgery recommendations appreciated  - Rad/onc recommendations appreciated - plan for 5 fractions of RT ; RT to be completed by 1/23  - PO Methadone 5mg BID (QTC on 1/12 - 438) (dose increased on 1/18)  - Increase to IV Decadron 4mg BID   - PO Gabapentin 300mg TID  - PO Oxy IR 15mg q4 PRN moderate pain (hold for hypotension, oversedation, respiratory depression)  - PO Oxy IR 20mg q4PRN severe pain (hold for hypotension, oversedation, respiratory depression)  - Encouraged patient to ask for prn doses if having pain and prior to RT session  - Bowel regimen while on opiates.  - Narcan PRN

## 2023-01-19 NOTE — PROGRESS NOTE ADULT - PROBLEM SELECTOR PLAN 3
- Reviewed with patient that nausea possibly related to underlying neoplasm   - Nausea resolved  - Decrease to PO Zofran 4mg q8 ATC  - IV Decadron 4mg BID  - IV Compazine PRN; encouraged patient to ask for prn doses when having nausea

## 2023-01-19 NOTE — PROGRESS NOTE ADULT - SUBJECTIVE AND OBJECTIVE BOX
Salome Hayward  [facility]  Pager #[phone]  Available on Microsoft Teams    Patient is a 58y old  Female who presents with a chief complaint of Difficulty ambulating (18 Jan 2023 16:21)      SUBJECTIVE / OVERNIGHT EVENTS: Patient seen and examined at bedside. Pt's daughter at bedside. Pt c/o severe left hip/leg pain, worsened after getting transferred into bed after RT this morning.    ADDITIONAL REVIEW OF SYSTEMS:    MEDICATIONS  (STANDING):  bisacodyl 5 milliGRAM(s) Oral at bedtime  chlorhexidine 2% Cloths 1 Application(s) Topical daily  cholecalciferol 400 Unit(s) Oral daily  dexAMETHasone  Injectable 4 milliGRAM(s) IV Push two times a day  dextrose 5%. 1000 milliLiter(s) (50 mL/Hr) IV Continuous <Continuous>  dextrose 5%. 1000 milliLiter(s) (100 mL/Hr) IV Continuous <Continuous>  dextrose 50% Injectable 25 Gram(s) IV Push once  dextrose 50% Injectable 25 Gram(s) IV Push once  dextrose 50% Injectable 12.5 Gram(s) IV Push once  enoxaparin Injectable 40 milliGRAM(s) SubCutaneous every 24 hours  gabapentin 300 milliGRAM(s) Oral three times a day  glucagon  Injectable 1 milliGRAM(s) IntraMuscular once  influenza   Vaccine 0.5 milliLiter(s) IntraMuscular once  insulin lispro (ADMELOG) corrective regimen sliding scale   SubCutaneous three times a day before meals  insulin lispro (ADMELOG) corrective regimen sliding scale   SubCutaneous at bedtime  melatonin 3 milliGRAM(s) Oral at bedtime  methadone    Tablet 5 milliGRAM(s) Oral every 12 hours  montelukast 10 milliGRAM(s) Oral at bedtime  multivitamin 1 Tablet(s) Oral daily  ondansetron   Disintegrating Tablet 4 milliGRAM(s) Oral every 6 hours  pantoprazole    Tablet 40 milliGRAM(s) Oral before breakfast  polyethylene glycol 3350 17 Gram(s) Oral two times a day  senna 2 Tablet(s) Oral at bedtime    MEDICATIONS  (PRN):  acetaminophen     Tablet .. 650 milliGRAM(s) Oral every 6 hours PRN Temp greater or equal to 38C (100.4F), Mild Pain (1 - 3)  dextrose Oral Gel 15 Gram(s) Oral once PRN Blood Glucose LESS THAN 70 milliGRAM(s)/deciliter  meclizine 12.5 milliGRAM(s) Oral every 6 hours PRN Dizziness  oxyCODONE    IR 15 milliGRAM(s) Oral every 4 hours PRN Moderate Pain (4 - 6)  oxyCODONE    IR 20 milliGRAM(s) Oral every 4 hours PRN Severe Pain (7 - 10)  prochlorperazine   Injectable 5 milliGRAM(s) IV Push every 8 hours PRN refractory nausea      CAPILLARY BLOOD GLUCOSE      POCT Blood Glucose.: 185 mg/dL (19 Jan 2023 12:09)  POCT Blood Glucose.: 198 mg/dL (19 Jan 2023 08:33)  POCT Blood Glucose.: 238 mg/dL (18 Jan 2023 22:26)  POCT Blood Glucose.: 109 mg/dL (18 Jan 2023 17:58)    I&O's Summary      PHYSICAL EXAM:    Vital Signs Last 24 Hrs  T(C): 36.2 (19 Jan 2023 11:54), Max: 37.3 (19 Jan 2023 06:58)  T(F): 97.2 (19 Jan 2023 11:54), Max: 99.1 (19 Jan 2023 06:58)  HR: 76 (19 Jan 2023 11:54) (74 - 82)  BP: 140/65 (19 Jan 2023 11:54) (112/63 - 140/65)  BP(mean): --  RR: 17 (19 Jan 2023 11:54) (17 - 17)  SpO2: 100% (19 Jan 2023 11:54) (96% - 100%)    Parameters below as of 19 Jan 2023 11:54  Patient On (Oxygen Delivery Method): room air    CONSTITUTIONAL: NAD, thin  EYES: Conjunctiva and sclera clear  ENMT: Moist oral mucosa  RESPIRATORY: Normal respiratory effort; lungs are clear to auscultation bilaterally  CARDIOVASCULAR: Regular rate and rhythm, normal S1 and S2, no murmur/rub/gallop; No lower extremity edema  ABDOMEN: Soft, nontender to palpation, normoactive bowel sounds  MUSCULOSKELETAL: L lateral hip area tender to palpation, ROM limited 2/2 pain, hot packs in place  PSYCH: A+O to person, place, and time; affect appropriate  SKIN: No rashes; no palpable lesions    LABS:                        10.0   4.49  )-----------( 164      ( 18 Jan 2023 06:30 )             31.0     01-18    134<L>  |  99  |  10  ----------------------------<  127<H>  4.5   |  25  |  0.53    Ca    9.9      18 Jan 2023 06:30  Phos  3.9     01-18  Mg     1.90     01-18    RADIOLOGY & ADDITIONAL TESTS: No new imaging  Results Reviewed: Yes  Imaging Personally Reviewed:  Electrocardiogram Personally Reviewed:    COORDINATION OF CARE:  Care Discussed with Consultants/Other Providers [Y/N]: Yes, palliative care- neoplastic pain, will increase Decadron to 4mg BID  Prior or Outpatient Records Reviewed [Y/N]:

## 2023-01-19 NOTE — PROGRESS NOTE ADULT - PROBLEM SELECTOR PLAN 1
P/w 1mo progressive LLE pain and difficulty ambulating x1wk; likely in setting of known L femur metastatic disease  - XR L knee/hip/pelvis/femur neg acute fx  - pain control per palliative: 2.5 mg methadone q8h, oxycodone 15mg q4h PRN for mod pain, 20 mg q4h for severe pain- encouraged pt to ask for meds as needed every q4h  - added Decadron, increase to 4mg IV BID  - bowel regimen while on opioids- last BM on 1/18  - MRI 1/5 completed, no surgical intervention. Plan for RT 5 sessions through 1/23  - evaluated by ortho, no plan for surgical intervention if there is no fracture, can follow up outpatient if pain persists after palliative RT

## 2023-01-19 NOTE — PROGRESS NOTE ADULT - PROBLEM SELECTOR PLAN 4
Patient discussed during oncology interdisciplinary rounds   Symptom management recommendations discussed with primary team   Thank you for allowing us to participate in your patient's care. Please page [phone] for any questions/concerns.

## 2023-01-19 NOTE — CHART NOTE - NSCHARTNOTEFT_GEN_A_CORE
Undergoing RT to the left hip to end on 1/23/23.  She missed 1/17/23 treatment since the RT machine required repair that day.    Ms. Lagunas yesterday completed RT but was crying during RT and after c/o leg pains.   Please premedicate prior to RT today planned for about 10-11am.

## 2023-01-20 LAB
ANION GAP SERPL CALC-SCNC: 10 MMOL/L — SIGNIFICANT CHANGE UP (ref 7–14)
BUN SERPL-MCNC: 13 MG/DL — SIGNIFICANT CHANGE UP (ref 7–23)
CALCIUM SERPL-MCNC: 9.3 MG/DL — SIGNIFICANT CHANGE UP (ref 8.4–10.5)
CHLORIDE SERPL-SCNC: 99 MMOL/L — SIGNIFICANT CHANGE UP (ref 98–107)
CO2 SERPL-SCNC: 24 MMOL/L — SIGNIFICANT CHANGE UP (ref 22–31)
CREAT SERPL-MCNC: 0.5 MG/DL — SIGNIFICANT CHANGE UP (ref 0.5–1.3)
EGFR: 109 ML/MIN/1.73M2 — SIGNIFICANT CHANGE UP
GLUCOSE BLDC GLUCOMTR-MCNC: 216 MG/DL — HIGH (ref 70–99)
GLUCOSE SERPL-MCNC: 161 MG/DL — HIGH (ref 70–99)
HCT VFR BLD CALC: 29.7 % — LOW (ref 34.5–45)
HGB BLD-MCNC: 9.7 G/DL — LOW (ref 11.5–15.5)
MAGNESIUM SERPL-MCNC: 1.9 MG/DL — SIGNIFICANT CHANGE UP (ref 1.6–2.6)
MCHC RBC-ENTMCNC: 24.8 PG — LOW (ref 27–34)
MCHC RBC-ENTMCNC: 32.7 GM/DL — SIGNIFICANT CHANGE UP (ref 32–36)
MCV RBC AUTO: 76 FL — LOW (ref 80–100)
NRBC # BLD: 0 /100 WBCS — SIGNIFICANT CHANGE UP (ref 0–0)
NRBC # FLD: 0 K/UL — SIGNIFICANT CHANGE UP (ref 0–0)
PHOSPHATE SERPL-MCNC: 3.4 MG/DL — SIGNIFICANT CHANGE UP (ref 2.5–4.5)
PLATELET # BLD AUTO: 174 K/UL — SIGNIFICANT CHANGE UP (ref 150–400)
POTASSIUM SERPL-MCNC: 4.2 MMOL/L — SIGNIFICANT CHANGE UP (ref 3.5–5.3)
POTASSIUM SERPL-SCNC: 4.2 MMOL/L — SIGNIFICANT CHANGE UP (ref 3.5–5.3)
RBC # BLD: 3.91 M/UL — SIGNIFICANT CHANGE UP (ref 3.8–5.2)
RBC # FLD: 14.6 % — HIGH (ref 10.3–14.5)
SODIUM SERPL-SCNC: 133 MMOL/L — LOW (ref 135–145)
WBC # BLD: 6.29 K/UL — SIGNIFICANT CHANGE UP (ref 3.8–10.5)
WBC # FLD AUTO: 6.29 K/UL — SIGNIFICANT CHANGE UP (ref 3.8–10.5)

## 2023-01-20 PROCEDURE — 99233 SBSQ HOSP IP/OBS HIGH 50: CPT

## 2023-01-20 PROCEDURE — 99232 SBSQ HOSP IP/OBS MODERATE 35: CPT

## 2023-01-20 RX ORDER — NALOXONE HYDROCHLORIDE 4 MG/.1ML
0.4 SPRAY NASAL
Refills: 0 | Status: DISCONTINUED | OUTPATIENT
Start: 2023-01-20 | End: 2023-02-03

## 2023-01-20 RX ORDER — MORPHINE SULFATE 50 MG/1
2 CAPSULE, EXTENDED RELEASE ORAL ONCE
Refills: 0 | Status: DISCONTINUED | OUTPATIENT
Start: 2023-01-20 | End: 2023-01-21

## 2023-01-20 RX ORDER — MORPHINE SULFATE 50 MG/1
2 CAPSULE, EXTENDED RELEASE ORAL ONCE
Refills: 0 | Status: DISCONTINUED | OUTPATIENT
Start: 2023-01-20 | End: 2023-01-20

## 2023-01-20 RX ORDER — ACETAMINOPHEN 500 MG
625 TABLET ORAL ONCE
Refills: 0 | Status: COMPLETED | OUTPATIENT
Start: 2023-01-20 | End: 2023-01-20

## 2023-01-20 RX ORDER — OXYCODONE HYDROCHLORIDE 5 MG/1
5 TABLET ORAL EVERY 6 HOURS
Refills: 0 | Status: DISCONTINUED | OUTPATIENT
Start: 2023-01-20 | End: 2023-01-21

## 2023-01-20 RX ADMIN — METHADONE HYDROCHLORIDE 5 MILLIGRAM(S): 40 TABLET ORAL at 18:33

## 2023-01-20 RX ADMIN — Medication 2: at 12:54

## 2023-01-20 RX ADMIN — Medication 250 MILLIGRAM(S): at 01:50

## 2023-01-20 RX ADMIN — OXYCODONE HYDROCHLORIDE 5 MILLIGRAM(S): 5 TABLET ORAL at 16:18

## 2023-01-20 RX ADMIN — ONDANSETRON 4 MILLIGRAM(S): 8 TABLET, FILM COATED ORAL at 15:19

## 2023-01-20 RX ADMIN — OXYCODONE HYDROCHLORIDE 20 MILLIGRAM(S): 5 TABLET ORAL at 09:36

## 2023-01-20 RX ADMIN — Medication 4: at 09:21

## 2023-01-20 RX ADMIN — SENNA PLUS 2 TABLET(S): 8.6 TABLET ORAL at 21:38

## 2023-01-20 RX ADMIN — OXYCODONE HYDROCHLORIDE 20 MILLIGRAM(S): 5 TABLET ORAL at 21:38

## 2023-01-20 RX ADMIN — Medication 5 MILLIGRAM(S): at 21:38

## 2023-01-20 RX ADMIN — OXYCODONE HYDROCHLORIDE 20 MILLIGRAM(S): 5 TABLET ORAL at 12:54

## 2023-01-20 RX ADMIN — OXYCODONE HYDROCHLORIDE 20 MILLIGRAM(S): 5 TABLET ORAL at 17:55

## 2023-01-20 RX ADMIN — OXYCODONE HYDROCHLORIDE 20 MILLIGRAM(S): 5 TABLET ORAL at 08:36

## 2023-01-20 RX ADMIN — MONTELUKAST 10 MILLIGRAM(S): 4 TABLET, CHEWABLE ORAL at 21:40

## 2023-01-20 RX ADMIN — GABAPENTIN 300 MILLIGRAM(S): 400 CAPSULE ORAL at 15:19

## 2023-01-20 RX ADMIN — ENOXAPARIN SODIUM 40 MILLIGRAM(S): 100 INJECTION SUBCUTANEOUS at 17:00

## 2023-01-20 RX ADMIN — GABAPENTIN 300 MILLIGRAM(S): 400 CAPSULE ORAL at 21:39

## 2023-01-20 RX ADMIN — GABAPENTIN 300 MILLIGRAM(S): 400 CAPSULE ORAL at 06:35

## 2023-01-20 RX ADMIN — METHADONE HYDROCHLORIDE 5 MILLIGRAM(S): 40 TABLET ORAL at 06:33

## 2023-01-20 RX ADMIN — OXYCODONE HYDROCHLORIDE 20 MILLIGRAM(S): 5 TABLET ORAL at 16:55

## 2023-01-20 RX ADMIN — Medication 4 MILLIGRAM(S): at 06:35

## 2023-01-20 RX ADMIN — Medication 4 MILLIGRAM(S): at 18:33

## 2023-01-20 RX ADMIN — Medication 400 UNIT(S): at 12:55

## 2023-01-20 RX ADMIN — Medication 1 TABLET(S): at 12:55

## 2023-01-20 RX ADMIN — POLYETHYLENE GLYCOL 3350 17 GRAM(S): 17 POWDER, FOR SOLUTION ORAL at 06:35

## 2023-01-20 RX ADMIN — Medication 625 MILLIGRAM(S): at 02:20

## 2023-01-20 RX ADMIN — CHLORHEXIDINE GLUCONATE 1 APPLICATION(S): 213 SOLUTION TOPICAL at 12:54

## 2023-01-20 RX ADMIN — MORPHINE SULFATE 2 MILLIGRAM(S): 50 CAPSULE, EXTENDED RELEASE ORAL at 07:24

## 2023-01-20 RX ADMIN — OXYCODONE HYDROCHLORIDE 20 MILLIGRAM(S): 5 TABLET ORAL at 14:00

## 2023-01-20 RX ADMIN — ONDANSETRON 4 MILLIGRAM(S): 8 TABLET, FILM COATED ORAL at 06:36

## 2023-01-20 RX ADMIN — Medication 3 MILLIGRAM(S): at 21:40

## 2023-01-20 RX ADMIN — ONDANSETRON 4 MILLIGRAM(S): 8 TABLET, FILM COATED ORAL at 21:39

## 2023-01-20 RX ADMIN — OXYCODONE HYDROCHLORIDE 5 MILLIGRAM(S): 5 TABLET ORAL at 15:18

## 2023-01-20 RX ADMIN — Medication 4: at 18:34

## 2023-01-20 RX ADMIN — OXYCODONE HYDROCHLORIDE 20 MILLIGRAM(S): 5 TABLET ORAL at 22:38

## 2023-01-20 RX ADMIN — PANTOPRAZOLE SODIUM 40 MILLIGRAM(S): 20 TABLET, DELAYED RELEASE ORAL at 06:49

## 2023-01-20 NOTE — PROGRESS NOTE ADULT - ASSESSMENT
56 yo F with metastatic pancreatic CA (mets to L spine, L femur). She was first diagnosed with pancreatic cancer in 2018 and then in 1/2019 underwent a Whipple for Stage III pancreatic cancer, T3N1 with 1/24 LN positive. She did well until 7/2022 when she was found to have recurrent disease and was started on chemo with Abraxane and Gemzar. She tolerated chemotherapy well overall. She is now admitted with LLE pain x 1mo with difficulty ambulating x1wk. CT shows no evidence of metastatic disease at this time. Bone scan and MRI show evidence of progression of cancer with metastatic lesions within the left femoral neck and superior articular facet of L5. The lesion in the left femoral neck likely places the patient at increased risk for pathologic fracture.    -As per Surgery, there is no plan for surgery at this time as it is thought that her pain will be relieved with radiation. If pain does not resolve or there is a fracture in the future then she can have surgery at a later date.  -Rad Oncology, patient has been simulated undergoing inpatient RT , total of 5 fractions, tentative end date 1/23  -GOC: They are interested in continuing treatment and understand that as she has had POD on the current chemo she should be evaluated for a new systemic therapy following RT.   -She is a good candidate for additional chemotherapy.  -No plans for inpatient chemotherapy  -Appreciate  Palliative Care consult for pain management  -Monitor CBC daily, pt counts may go down in the setting of recent chemo  -PT rec rehab, patient preferring to go home with services  -Patient to followup with her primary oncologist Dr. Katty Navarro ([facility]) upon discharge  -C/w Supportive care, pain control, Nutrition, PT, DVT ppx  -Oncology will continue to follow with you      Case discussed with Dr. Roseanne CASTLE  Oncology Physician Assistant  Althea DICKENS/KAREN [facility]  Pager [phone] also available on Teams    If before 8am/after 5pm or on weekends please page On-call Oncology Fellow

## 2023-01-20 NOTE — PROGRESS NOTE ADULT - SUBJECTIVE AND OBJECTIVE BOX
INTERVAL HPI/OVERNIGHT EVENTS:  Patient seen at bedside.  Accompanied by her dtr Cintia who provided Persian  translation   as per patient request  Patient with complaints of pain  Worse since starting RT, may a/w transferring to and from bed  Eating minimally, complaining of constipation x 3 days      VITAL SIGNS:  T(F): 98.2 (01-20-23 @ 13:09)  HR: 71 (01-20-23 @ 13:09)  BP: 125/69 (01-20-23 @ 13:09)  RR: 18 (01-20-23 @ 13:09)  SpO2: 97% (01-20-23 @ 13:09)  Wt(kg): --    PHYSICAL EXAM:  EYES: Conjunctiva and sclera clear  ENMT: Moist oral mucosa  RESPIRATORY: Normal respiratory effort; lungs are clear to auscultation bilaterally  CARDIOVASCULAR: Regular rate and rhythm, normal S1 and S2, no murmur/rub/gallop; No lower extremity edema  ABDOMEN: Soft, nontender to palpation, normoactive bowel sounds  MUSCULOSKELETAL: L lateral hip area tender to palpation, ROM limited 2/2 pain, hot packs in place  PSYCH: A+O to person, place, and time; affect appropriate  SKIN: No rashes; no palpable lesions    MEDICATIONS  (STANDING):  bisacodyl 5 milliGRAM(s) Oral at bedtime  chlorhexidine 2% Cloths 1 Application(s) Topical daily  cholecalciferol 400 Unit(s) Oral daily  dexAMETHasone  Injectable 4 milliGRAM(s) IV Push two times a day  dextrose 5%. 1000 milliLiter(s) (50 mL/Hr) IV Continuous <Continuous>  dextrose 5%. 1000 milliLiter(s) (100 mL/Hr) IV Continuous <Continuous>  dextrose 50% Injectable 12.5 Gram(s) IV Push once  dextrose 50% Injectable 25 Gram(s) IV Push once  dextrose 50% Injectable 25 Gram(s) IV Push once  enoxaparin Injectable 40 milliGRAM(s) SubCutaneous every 24 hours  gabapentin 300 milliGRAM(s) Oral three times a day  glucagon  Injectable 1 milliGRAM(s) IntraMuscular once  influenza   Vaccine 0.5 milliLiter(s) IntraMuscular once  insulin lispro (ADMELOG) corrective regimen sliding scale   SubCutaneous three times a day before meals  insulin lispro (ADMELOG) corrective regimen sliding scale   SubCutaneous at bedtime  melatonin 3 milliGRAM(s) Oral at bedtime  methadone    Tablet 5 milliGRAM(s) Oral every 12 hours  montelukast 10 milliGRAM(s) Oral at bedtime  morphine  - Injectable 2 milliGRAM(s) IV Push once  multivitamin 1 Tablet(s) Oral daily  ondansetron   Disintegrating Tablet 4 milliGRAM(s) Oral every 8 hours  oxyCODONE    IR 5 milliGRAM(s) Oral every 6 hours  pantoprazole    Tablet 40 milliGRAM(s) Oral before breakfast  polyethylene glycol 3350 17 Gram(s) Oral two times a day  senna 2 Tablet(s) Oral at bedtime    MEDICATIONS  (PRN):  acetaminophen     Tablet .. 650 milliGRAM(s) Oral every 6 hours PRN Temp greater or equal to 38C (100.4F), Mild Pain (1 - 3)  dextrose Oral Gel 15 Gram(s) Oral once PRN Blood Glucose LESS THAN 70 milliGRAM(s)/deciliter  meclizine 12.5 milliGRAM(s) Oral every 6 hours PRN Dizziness  oxyCODONE    IR 15 milliGRAM(s) Oral every 4 hours PRN Moderate Pain (4 - 6)  oxyCODONE    IR 20 milliGRAM(s) Oral every 4 hours PRN Severe Pain (7 - 10)  prochlorperazine   Injectable 5 milliGRAM(s) IV Push every 8 hours PRN refractory nausea      Allergies    No Known Allergies    Intolerances        LABS:                        9.7    6.29  )-----------( 174      ( 20 Jan 2023 06:00 )             29.7     01-20    133<L>  |  99  |  13  ----------------------------<  161<H>  4.2   |  24  |  0.50    Ca    9.3      20 Jan 2023 06:00  Phos  3.4     01-20  Mg     1.90     01-20            RADIOLOGY & ADDITIONAL TESTS:  Studies reviewed.

## 2023-01-20 NOTE — PROGRESS NOTE ADULT - PROBLEM SELECTOR PLAN 2
- pt with hx pancreatic CA f/w Dr. 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

## 2023-01-20 NOTE — PROGRESS NOTE ADULT - PROBLEM SELECTOR PLAN 2
- Pain uncontrolled  - MRI - Metastatic lesions within the left femoral neck and superior articular facet of L5. The lesion in the left femoral neck likely places the patient at increased risk for pathologic fracture. Small left hip effusion.  - Ortho and Neurosurgery recommendations appreciated  - Rad/onc recommendations appreciated - plan for 5 fractions of RT ; RT to be completed by 1/23  - PO Methadone 5mg BID (QTC on 1/12 - 438) (dose increased on 1/18)  - Increase to IV Decadron 4mg BID   - PO Gabapentin 300mg TID  - PO Oxy IR 15mg q4 PRN moderate pain (hold for hypotension, oversedation, respiratory depression)  - PO Oxy IR 20mg q4PRN severe pain (hold for hypotension, oversedation, respiratory depression)  - Encouraged patient to ask for prn doses if having pain and prior to RT session  - Bowel regimen while on opiates.  - Narcan PRN - Pain uncontrolled  - MRI - Metastatic lesions within the left femoral neck and superior articular facet of L5. The lesion in the left femoral neck likely places the patient at increased risk for pathologic fracture. Small left hip effusion.  - Ortho and Neurosurgery recommendations appreciated  - Rad/onc recommendations appreciated - plan for 5 fractions of RT ; RT to be completed by 1/23  - PO Methadone 5mg BID (QTC on 1/12 - 438) (dose increased on 1/18) ; will reassess patient's MME requirements next week regarding methadone increase  - Start PO Oxy IR 5mg q6 ATC; patient may refuse doses   - IV Decadron 4mg BID   - PO Gabapentin 300mg TID  - PO Oxy IR 15mg q4 PRN moderate pain (hold for hypotension, oversedation, respiratory depression)  - PO Oxy IR 20mg q4PRN severe pain (hold for hypotension, oversedation, respiratory depression)  - Can consider IV Morphine 2mg PRN prior to RT session   - Bowel regimen while on opiates.  - Narcan PRN

## 2023-01-20 NOTE — PROGRESS NOTE ADULT - PROBLEM SELECTOR PLAN 1
P/w 1mo progressive LLE pain and difficulty ambulating x1wk; likely in setting of known L femur metastatic disease  - XR L knee/hip/pelvis/femur neg acute fx  - pain control per palliative: 2.5 mg methadone q8h, oxycodone 15mg q4h PRN for mod pain, 20 mg q4h for severe pain- encouraged pt to ask for meds as needed every q4h  - added Decadron, increase to 4mg IV BID  - Discussed with palliative care team - will start staning oxycodone 5mg q6 x3 days. Also can give dose IV Morphine prior to going for RT   - bowel regimen while on opioids- last BM on 1/18  - MRI 1/5 completed, no surgical intervention. Plan for RT 5 sessions through 1/23  - evaluated by ortho, no plan for surgical intervention if there is no fracture, can follow up outpatient if pain persists after palliative RT

## 2023-01-20 NOTE — PROGRESS NOTE ADULT - PROBLEM SELECTOR PLAN 4
Patient discussed during oncology interdisciplinary rounds   Symptom management recommendations discussed with primary team   Thank you for allowing us to participate in your patient's care. Please page [phone] for any questions/concerns. Modified Advancement Flap Text: The defect edges were debeveled with a #15 scalpel blade.  Given the location of the defect, shape of the defect and the proximity to free margins a modified advancement flap was deemed most appropriate.  Using a sterile surgical marker, an appropriate advancement flap was drawn incorporating the defect and placing the expected incisions within the relaxed skin tension lines where possible.    The area thus outlined was incised deep to adipose tissue with a #15 scalpel blade.  The skin margins were undermined to an appropriate distance in all directions utilizing iris scissors.

## 2023-01-20 NOTE — CHART NOTE - NSCHARTNOTEFT_GEN_A_CORE
Source: Patient [ X]    Family [X ] daughter at bedside    other [X ] RN, electronic chart     Diet : Diet, Regular:   Halal  No Beef  No Pork  Supplement Feeding Modality:  Oral  Ensure Max Cans or Servings Per Day:  1       Frequency:  Three Times a day (01-09-23 @ 14:09)    Nutrition follow-up note, severe malnutrition. Per chart review, 57 y/o female with medical history of pancreat CA (mets to L spine, L femur; Cong, chemo ambraxane/gemzar 12/27, radiation ?12/15), T2D, GERD, asthma, and HTN p/w LLE pain x 1mo with difficulty ambulating x1wk. Likely in the setting of known metastatic disease.    Patient is Kinyarwanda speaking, RD able to communicate in patient preferred language. Endorses poor appetite and PO intake <50% of meals. She is having most meals brought from home (about 50-75% of meals). Denies any nausea/vomiting/diarrhea/constipation or difficulty chewing and swallowing. +BM 1/18. No beef, pork and takes only Halal Diet. Daughter is around for most of the day. Encourages and assists mother to eat. RD explored food preference but daughter states she is filling out the menu and brings most items that mother enjoys. Having about 1 Ensure Max. Continue to provide mealtime encouragement. Strategies to increase kcal and protein intake reinforced.      Weight: 41.7kg 1/9, 12/30      Pertinent Medications: bisacodyl  cholecalciferol  dexAMETHasone  Injectable  dextrose 5%.  dextrose 5%.  dextrose 50% Injectable  dextrose 50% Injectable  dextrose 50% Injectable  gabapentin  glucagon  Injectable  insulin lispro (ADMELOG) corrective regimen sliding scale  insulin lispro (ADMELOG) corrective regimen sliding scale  melatonin  methadone    Tablet  montelukast  morphine  - Injectable  multivitamin  ondansetron   Disintegrating Tablet  oxyCODONE    IR  pantoprazole    Tablet  polyethylene glycol 3350  senna    Pertinent Labs:  01-20 Na133 mmol/L<L> Glu 161 mg/dL<H> K+ 4.2 mmol/L Cr  0.50 mg/dL BUN 13 mg/dL 01-20 Phos 3.4 mg/dL      Skin: No pressure ulcers/DTI noted in flowsheets.   No edema noted.     Estimated Needs:   [X ] no change since previous assessment  [ ] recalculated:       Previous Nutrition Diagnosis:   [X ] Malnutrition, severe     Nutrition Diagnosis is [X ] ongoing  [ ] resolved [ ] not applicable     Additional Recommendations:     1. Continue current diet order, which remains appropriate at this time.   2. Monitor weights, labs, BM's, skin integrity, p.o. intake.   3. RN to obtain new weight and weekly weights.  4. Please document % PO intake in nursing flowsheet.   5. Please Encourage po intake, assist with meals and menu selections, provide alternatives PRN.   6. Honor food and beverage preferences within diet restriction of patient in an effort to maximize level of nutrient intake.

## 2023-01-20 NOTE — PROGRESS NOTE ADULT - ASSESSMENT
58 yo [place]het[place]-speaking F with PMH pancreatic CA (mets to L spine, L femur; s/p chemo with abraxane/gemzar 12/27, radiation ?12/15; s/p Whipple in 2017), Type 2 DM, GERD, asthma, and HTN p/w LLE pain x 1mo with difficulty ambulating x1wk. Likely in the setting of known metastatic disease. XR showed lucency on the L femoral neck. Course c/b severe pain, N/V, constipation -- palliative following for pain recs. Ortho deferring surgical management of L femur until radiation completed. Rad-onc following, plan for 5 sessions of RT through 1/23.

## 2023-01-20 NOTE — PROGRESS NOTE ADULT - SUBJECTIVE AND OBJECTIVE BOX
SUBJECTIVE AND OBJECTIVE:  Patient seen and examined at bedside. Patient being followed up for pain.   Patient declined Icelandic ; preferred for daughter at bedside to translate.   Patient states leg pain still severe, and worsened with transfers from bed. Pain is severe also during RT session; daughter asking if IV pain medication can be given prior to RT to help patient tolerate treatment     INTERVAL HPI/OVERNIGHT EVENTS:  Received 2 doses of PO Oxy IR 20mg in past 24 hours.     Allergies    No Known Allergies    Intolerances      MEDICATIONS  (STANDING):  bisacodyl 5 milliGRAM(s) Oral at bedtime  chlorhexidine 2% Cloths 1 Application(s) Topical daily  cholecalciferol 400 Unit(s) Oral daily  dexAMETHasone  Injectable 4 milliGRAM(s) IV Push two times a day  dextrose 5%. 1000 milliLiter(s) (50 mL/Hr) IV Continuous <Continuous>  dextrose 5%. 1000 milliLiter(s) (100 mL/Hr) IV Continuous <Continuous>  dextrose 50% Injectable 12.5 Gram(s) IV Push once  dextrose 50% Injectable 25 Gram(s) IV Push once  dextrose 50% Injectable 25 Gram(s) IV Push once  enoxaparin Injectable 40 milliGRAM(s) SubCutaneous every 24 hours  gabapentin 300 milliGRAM(s) Oral three times a day  glucagon  Injectable 1 milliGRAM(s) IntraMuscular once  influenza   Vaccine 0.5 milliLiter(s) IntraMuscular once  insulin lispro (ADMELOG) corrective regimen sliding scale   SubCutaneous three times a day before meals  insulin lispro (ADMELOG) corrective regimen sliding scale   SubCutaneous at bedtime  melatonin 3 milliGRAM(s) Oral at bedtime  methadone    Tablet 5 milliGRAM(s) Oral every 12 hours  montelukast 10 milliGRAM(s) Oral at bedtime  morphine  - Injectable 2 milliGRAM(s) IV Push once  multivitamin 1 Tablet(s) Oral daily  ondansetron   Disintegrating Tablet 4 milliGRAM(s) Oral every 8 hours  oxyCODONE    IR 5 milliGRAM(s) Oral every 6 hours  pantoprazole    Tablet 40 milliGRAM(s) Oral before breakfast  polyethylene glycol 3350 17 Gram(s) Oral two times a day  senna 2 Tablet(s) Oral at bedtime    MEDICATIONS  (PRN):  acetaminophen     Tablet .. 650 milliGRAM(s) Oral every 6 hours PRN Temp greater or equal to 38C (100.4F), Mild Pain (1 - 3)  dextrose Oral Gel 15 Gram(s) Oral once PRN Blood Glucose LESS THAN 70 milliGRAM(s)/deciliter  meclizine 12.5 milliGRAM(s) Oral every 6 hours PRN Dizziness  oxyCODONE    IR 15 milliGRAM(s) Oral every 4 hours PRN Moderate Pain (4 - 6)  oxyCODONE    IR 20 milliGRAM(s) Oral every 4 hours PRN Severe Pain (7 - 10)  prochlorperazine   Injectable 5 milliGRAM(s) IV Push every 8 hours PRN refractory nausea    ITEMS UNCHECKED ARE NOT PRESENT    PRESENT SYMPTOMS: [ ]Unable to self-report due to altered mental status- see [ ] CPOT [ ] PAINADS [ ] RDOS  Source if other than patient:  [ ]Family   [ ]Team     Pain: [ x]yes [ ]no  QOL impact - severe  Location - left thigh                     Aggravating factors - movement   Quality - tingling   Radiation - left lower extremity and lower back  Timing- constant with intermittent worsening episodes   Severity (0-10 scale): 10  Minimal acceptable level / Pain Goal (0-10 scale):  7-8    Dyspnea:                           [ ]Mild [ ]Moderate [ ]Severe  Anxiety:                             [ ]Mild [ ]Moderate [ ]Severe  Agitation:                          [ ]Mild [ ]Moderate [ ]Severe  Fatigue:                             [ ]Mild [ ]Moderate [ ]Severe  Nausea:                             [ ]Mild [ ]Moderate [ ]Severe  Loss of appetite:              [ ]Mild [ ]Moderate [ ]Severe  Constipation:                   [x ]Mild [ ]Moderate [ ]Severe  Diarrhea:                          [ ]Mild [ ]Moderate [ ]Severe    CPOT:    https://www.[facility].org/getattachment/[email or web address]/Critical-Care-Pain-Observation-Tool-(CPOT)    PCSSQ[Palliative Care Spiritual Screening Question]   Severity (0-10):  Score of 4 or > indicate consideration of Chaplaincy referral.  Chaplaincy Referral: [ ] yes [ ] refused [ ] following [x ] deferred    Caregiver [place]? : [ ] yes [ ] no [ ] Declined [x ] Deferred              Social work referral [ ] Patient & Family Centered Care Referral [ ]     Anticipatory Grief present?:  [x ] yes [ ] no  [ ] Deferred                  Social work referral [x ] Chaplaincy Referral[ ]    Other Symptoms:  [ x]All other review of systems negative     PHYSICAL EXAM:  Vital Signs Last 24 Hrs  T(C): 36.2 (19 Jan 2023 11:54), Max: 37.3 (19 Jan 2023 06:58)  T(F): 97.2 (19 Jan 2023 11:54), Max: 99.1 (19 Jan 2023 06:58)  HR: 76 (19 Jan 2023 11:54) (74 - 82)  BP: 140/65 (19 Jan 2023 11:54) (112/63 - 140/65)  BP(mean): --  RR: 17 (19 Jan 2023 11:54) (17 - 17)  SpO2: 100% (19 Jan 2023 11:54) (96% - 100%)    Parameters below as of 19 Jan 2023 11:54  Patient On (Oxygen Delivery Method): room air      GENERAL:  [x ]Alert  [x ]Oriented x 3  [ ]Lethargic  [ ]Cachexia  [ ]Unarousable  [x ]Verbal  [ ]Non-Verbal    Behavioral:   [ ] Anxiety  [ ] Delirium [ ] Agitation [ x] Calm  [ ] Other  HEENT:  [ x]Normal  [ ] Temporal Wasting  [ ]Dry mouth   [ ]ET Tube/Trach  [ ]Oral lesions  [ ] Mucositis  PULMONARY:   [ x]Clear [ ]Tachypnea  [ ]Audible excessive secretions   [ ]Rhonchi        [ ]Right [ ]Left [ ]Bilateral  [ ]Crackles        [ ]Right [ ]Left [ ]Bilateral  [ ]Wheezing     [ ]Right [ ]Left [ ]Bilateral  [ ]Diminished breath sounds [ ]right [ ]left [ ]bilateral  CARDIOVASCULAR:    [ x]Regular [ ]Irregular [ ]Tachy  [ ]Dmitry [ ]Murmur [ ]Other  GASTROINTESTINAL:  [ x]Soft  [ ]Distended   [ ]+BS  [x ]Non tender [ ]Tender  [ ]PEG [ ]OGT/ NGT  Last BM: 1/17  GENITOURINARY:   [x ]Normal [ ] Incontinent   [ ]Oliguria/Anuria   [ ]Swanson  MUSCULOSKELETAL:   [ ]Normal   [x ]Weakness; left lower extremity movement limited due to pain  [ ]Bed/Wheelchair bound [ ]Edema  [  ] amputation  [  ] contraction  NEUROLOGIC:   [ x]No focal deficits  [ ]Cognitive impairment  [ ]Dysphagia [ ]Dysarthria [ ]Paresis [ ]Other   SKIN: See Nursing Skin Assessment for further details  [x ]Normal    [ ]Rash  [ ]Pressure ulcer(s)       Present on admission [ ]y [ ]n   [  ]  Wound    [  ] hyperpigmentation      CRITICAL CARE:  [ ]Shock Present  [ ]Septic [ ]Cardiogenic [ ]Neurologic [ ]Hypovolemic  [ ]Vasopressors [ ]Inotropes  [ ]Respiratory failure present [ ]Mechanical Ventilation [ ]Non-invasive ventilatory support [ ]High-Flow   [ ]Acute  [ ]Chronic [ ]Hypoxic  [ ]Hypercarbic [ ]Other  [ ]Other organ failure     LABS:                                            10.0   4.49  )-----------( 164      ( 18 Jan 2023 06:30 )             31.0       01-18    134<L>  |  99  |  10  ----------------------------<  127<H>  4.5   |  25  |  0.53    Ca    9.9      18 Jan 2023 06:30  Phos  3.9     01-18  Mg     1.90     01-18      RADIOLOGY & ADDITIONAL STUDIES: reviewed     Protein Calorie Malnutrition Present: [ ]mild [ ]moderate [ ]severe [ ]underweight [ ]morbid obesity  https://www.andeal.org/vault/2440/web/files/ONC/Table_Clinical%20Characteristics%20to%20Document%20Malnutrition-White%20JV%20et%20al%835014.pdf    Height (cm): 149 (12-29-22 @ 22:41), 149 (12-27-22 @ 16:21), 147.3 (10-22-22 @ 08:57)  Weight (kg): 43.5 (12-27-22 @ 16:21), 47 (10-22-22 @ 08:57), 45.9 (07-20-22 @ 11:51)  BMI (kg/m2): 19.6 (12-29-22 @ 22:41), 19.6 (12-27-22 @ 16:21), 21.7 (10-22-22 @ 08:57)    [ ]PPSV2 < or = 30%  [ ]significant weight loss [ ]poor nutritional intake [ ]anasarca   [ ]Artificial Nutrition    REFERRALS:   [ ]Chaplaincy  [ ]Hospice  [ ]Child Life  [ ]Social Work  [ x]Case management [ ]Holistic Therapy      SUBJECTIVE AND OBJECTIVE:  Patient seen and examined at bedside. Patient being followed up for pain.   Patient declined Maori ; preferred for daughter at bedside to translate.   Patient states leg pain still severe, and worsened with transfers from bed. Pain is severe also during RT session; daughter asking if IV pain medication can be given prior to RT to help patient tolerate treatment     INTERVAL HPI/OVERNIGHT EVENTS:  Received 2 doses of PO Oxy IR 20mg in past 24 hours.     Allergies    No Known Allergies    Intolerances      MEDICATIONS  (STANDING):  bisacodyl 5 milliGRAM(s) Oral at bedtime  chlorhexidine 2% Cloths 1 Application(s) Topical daily  cholecalciferol 400 Unit(s) Oral daily  dexAMETHasone  Injectable 4 milliGRAM(s) IV Push two times a day  dextrose 5%. 1000 milliLiter(s) (50 mL/Hr) IV Continuous <Continuous>  dextrose 5%. 1000 milliLiter(s) (100 mL/Hr) IV Continuous <Continuous>  dextrose 50% Injectable 12.5 Gram(s) IV Push once  dextrose 50% Injectable 25 Gram(s) IV Push once  dextrose 50% Injectable 25 Gram(s) IV Push once  enoxaparin Injectable 40 milliGRAM(s) SubCutaneous every 24 hours  gabapentin 300 milliGRAM(s) Oral three times a day  glucagon  Injectable 1 milliGRAM(s) IntraMuscular once  influenza   Vaccine 0.5 milliLiter(s) IntraMuscular once  insulin lispro (ADMELOG) corrective regimen sliding scale   SubCutaneous three times a day before meals  insulin lispro (ADMELOG) corrective regimen sliding scale   SubCutaneous at bedtime  melatonin 3 milliGRAM(s) Oral at bedtime  methadone    Tablet 5 milliGRAM(s) Oral every 12 hours  montelukast 10 milliGRAM(s) Oral at bedtime  morphine  - Injectable 2 milliGRAM(s) IV Push once  multivitamin 1 Tablet(s) Oral daily  ondansetron   Disintegrating Tablet 4 milliGRAM(s) Oral every 8 hours  oxyCODONE    IR 5 milliGRAM(s) Oral every 6 hours  pantoprazole    Tablet 40 milliGRAM(s) Oral before breakfast  polyethylene glycol 3350 17 Gram(s) Oral two times a day  senna 2 Tablet(s) Oral at bedtime    MEDICATIONS  (PRN):  acetaminophen     Tablet .. 650 milliGRAM(s) Oral every 6 hours PRN Temp greater or equal to 38C (100.4F), Mild Pain (1 - 3)  dextrose Oral Gel 15 Gram(s) Oral once PRN Blood Glucose LESS THAN 70 milliGRAM(s)/deciliter  meclizine 12.5 milliGRAM(s) Oral every 6 hours PRN Dizziness  oxyCODONE    IR 15 milliGRAM(s) Oral every 4 hours PRN Moderate Pain (4 - 6)  oxyCODONE    IR 20 milliGRAM(s) Oral every 4 hours PRN Severe Pain (7 - 10)  prochlorperazine   Injectable 5 milliGRAM(s) IV Push every 8 hours PRN refractory nausea    ITEMS UNCHECKED ARE NOT PRESENT    PRESENT SYMPTOMS: [ ]Unable to self-report due to altered mental status- see [ ] CPOT [ ] PAINADS [ ] RDOS  Source if other than patient:  [ ]Family   [ ]Team     Pain: [ x]yes [ ]no  QOL impact - severe  Location - left thigh                     Aggravating factors - movement   Quality - tingling   Radiation - left lower extremity and lower back  Timing- constant with intermittent worsening episodes   Severity (0-10 scale): 10  Minimal acceptable level / Pain Goal (0-10 scale):  7-8    Dyspnea:                           [ ]Mild [ ]Moderate [ ]Severe  Anxiety:                             [ ]Mild [ ]Moderate [ ]Severe  Agitation:                          [ ]Mild [ ]Moderate [ ]Severe  Fatigue:                             [ ]Mild [ ]Moderate [ ]Severe  Nausea:                             [ ]Mild [ ]Moderate [ ]Severe  Loss of appetite:              [ ]Mild [ ]Moderate [ ]Severe  Constipation:                   [x ]Mild [ ]Moderate [ ]Severe  Diarrhea:                          [ ]Mild [ ]Moderate [ ]Severe    CPOT:    https://www.[email or web address].org/getattachment/[email or web address]/Critical-Care-Pain-Observation-Tool-(CPOT)    PCSSQ[Palliative Care Spiritual Screening Question]   Severity (0-10):  Score of 4 or > indicate consideration of Chaplaincy referral.  Chaplaincy Referral: [ ] yes [ ] refused [ ] following [x ] deferred    Caregiver [place]? : [ ] yes [ ] no [ ] Declined [x ] Deferred              Social work referral [ ] Patient & Family Centered Care Referral [ ]     Anticipatory Grief present?:  [x ] yes [ ] no  [ ] Deferred                  Social work referral [x ] Chaplaincy Referral[ ]    Other Symptoms:  [ x]All other review of systems negative     PHYSICAL EXAM:  Vital Signs Last 24 Hrs  T(C): 36.8 (20 Jan 2023 13:09), Max: 37.1 (19 Jan 2023 20:59)  T(F): 98.2 (20 Jan 2023 13:09), Max: 98.7 (19 Jan 2023 20:59)  HR: 71 (20 Jan 2023 13:09) (71 - 85)  BP: 125/69 (20 Jan 2023 13:09) (98/81 - 125/69)  BP(mean): --  RR: 18 (20 Jan 2023 13:09) (17 - 18)  SpO2: 97% (20 Jan 2023 13:09) (97% - 99%)    Parameters below as of 20 Jan 2023 13:09  Patient On (Oxygen Delivery Method): room air    GENERAL:  [x ]Alert  [x ]Oriented x 3  [ ]Lethargic  [ ]Cachexia  [ ]Unarousable  [x ]Verbal  [ ]Non-Verbal    Behavioral:   [ ] Anxiety  [ ] Delirium [ ] Agitation [ x] Calm  [ ] Other  HEENT:  [ x]Normal  [ ] Temporal Wasting  [ ]Dry mouth   [ ]ET Tube/Trach  [ ]Oral lesions  [ ] Mucositis  PULMONARY:   [ x]Clear [ ]Tachypnea  [ ]Audible excessive secretions   [ ]Rhonchi        [ ]Right [ ]Left [ ]Bilateral  [ ]Crackles        [ ]Right [ ]Left [ ]Bilateral  [ ]Wheezing     [ ]Right [ ]Left [ ]Bilateral  [ ]Diminished breath sounds [ ]right [ ]left [ ]bilateral  CARDIOVASCULAR:    [ x]Regular [ ]Irregular [ ]Tachy  [ ]Dmitry [ ]Murmur [ ]Other  GASTROINTESTINAL:  [ x]Soft  [ ]Distended   [ ]+BS  [x ]Non tender [ ]Tender  [ ]PEG [ ]OGT/ NGT  Last BM: 1/17  GENITOURINARY:   [x ]Normal [ ] Incontinent   [ ]Oliguria/Anuria   [ ]Swanson  MUSCULOSKELETAL:   [ ]Normal   [x ]Weakness; left lower extremity movement limited due to pain  [ ]Bed/Wheelchair bound [ ]Edema  [  ] amputation  [  ] contraction  NEUROLOGIC:   [ x]No focal deficits  [ ]Cognitive impairment  [ ]Dysphagia [ ]Dysarthria [ ]Paresis [ ]Other   SKIN: See Nursing Skin Assessment for further details  [x ]Normal    [ ]Rash  [ ]Pressure ulcer(s)       Present on admission [ ]y [ ]n   [  ]  Wound    [  ] hyperpigmentation      CRITICAL CARE:  [ ]Shock Present  [ ]Septic [ ]Cardiogenic [ ]Neurologic [ ]Hypovolemic  [ ]Vasopressors [ ]Inotropes  [ ]Respiratory failure present [ ]Mechanical Ventilation [ ]Non-invasive ventilatory support [ ]High-Flow   [ ]Acute  [ ]Chronic [ ]Hypoxic  [ ]Hypercarbic [ ]Other  [ ]Other organ failure     LABS:                                            9.7    6.29  )-----------( 174      ( 20 Jan 2023 06:00 )             29.7       01-20    133<L>  |  99  |  13  ----------------------------<  161<H>  4.2   |  24  |  0.50    Ca    9.3      20 Jan 2023 06:00  Phos  3.4     01-20  Mg     1.90     01-20      RADIOLOGY & ADDITIONAL STUDIES: reviewed     Protein Calorie Malnutrition Present: [ ]mild [ ]moderate [ ]severe [ ]underweight [ ]morbid obesity  https://www.andeal.org/vault/2440/web/files/ONC/Table_Clinical%20Characteristics%20to%20Document%20Malnutrition-White%20JV%20et%20al%789853.pdf    Height (cm): 149 (12-29-22 @ 22:41), 149 (12-27-22 @ 16:21), 147.3 (10-22-22 @ 08:57)  Weight (kg): 43.5 (12-27-22 @ 16:21), 47 (10-22-22 @ 08:57), 45.9 (07-20-22 @ 11:51)  BMI (kg/m2): 19.6 (12-29-22 @ 22:41), 19.6 (12-27-22 @ 16:21), 21.7 (10-22-22 @ 08:57)    [ ]PPSV2 < or = 30%  [ ]significant weight loss [ ]poor nutritional intake [ ]anasarca   [ ]Artificial Nutrition    REFERRALS:   [ ]Chaplaincy  [ ]Hospice  [ ]Child Life  [ ]Social Work  [ x]Case management [ ]Holistic Therapy

## 2023-01-20 NOTE — PROGRESS NOTE ADULT - SUBJECTIVE AND OBJECTIVE BOX
Patient is a 58y old  Female who presents with a chief complaint of Difficulty ambulating (20 Jan 2023 14:35)      SUBJECTIVE / OVERNIGHT EVENTS: No acute events overnight. Pt continues to have 10/10 pain     ADDITIONAL REVIEW OF SYSTEMS:    MEDICATIONS  (STANDING):  bisacodyl 5 milliGRAM(s) Oral at bedtime  chlorhexidine 2% Cloths 1 Application(s) Topical daily  cholecalciferol 400 Unit(s) Oral daily  dexAMETHasone  Injectable 4 milliGRAM(s) IV Push two times a day  dextrose 5%. 1000 milliLiter(s) (50 mL/Hr) IV Continuous <Continuous>  dextrose 5%. 1000 milliLiter(s) (100 mL/Hr) IV Continuous <Continuous>  dextrose 50% Injectable 12.5 Gram(s) IV Push once  dextrose 50% Injectable 25 Gram(s) IV Push once  dextrose 50% Injectable 25 Gram(s) IV Push once  enoxaparin Injectable 40 milliGRAM(s) SubCutaneous every 24 hours  gabapentin 300 milliGRAM(s) Oral three times a day  glucagon  Injectable 1 milliGRAM(s) IntraMuscular once  influenza   Vaccine 0.5 milliLiter(s) IntraMuscular once  insulin lispro (ADMELOG) corrective regimen sliding scale   SubCutaneous three times a day before meals  insulin lispro (ADMELOG) corrective regimen sliding scale   SubCutaneous at bedtime  melatonin 3 milliGRAM(s) Oral at bedtime  methadone    Tablet 5 milliGRAM(s) Oral every 12 hours  montelukast 10 milliGRAM(s) Oral at bedtime  morphine  - Injectable 2 milliGRAM(s) IV Push once  multivitamin 1 Tablet(s) Oral daily  ondansetron   Disintegrating Tablet 4 milliGRAM(s) Oral every 8 hours  oxyCODONE    IR 5 milliGRAM(s) Oral every 6 hours  pantoprazole    Tablet 40 milliGRAM(s) Oral before breakfast  polyethylene glycol 3350 17 Gram(s) Oral two times a day  senna 2 Tablet(s) Oral at bedtime    MEDICATIONS  (PRN):  acetaminophen     Tablet .. 650 milliGRAM(s) Oral every 6 hours PRN Temp greater or equal to 38C (100.4F), Mild Pain (1 - 3)  dextrose Oral Gel 15 Gram(s) Oral once PRN Blood Glucose LESS THAN 70 milliGRAM(s)/deciliter  meclizine 12.5 milliGRAM(s) Oral every 6 hours PRN Dizziness  oxyCODONE    IR 15 milliGRAM(s) Oral every 4 hours PRN Moderate Pain (4 - 6)  oxyCODONE    IR 20 milliGRAM(s) Oral every 4 hours PRN Severe Pain (7 - 10)  prochlorperazine   Injectable 5 milliGRAM(s) IV Push every 8 hours PRN refractory nausea      CAPILLARY BLOOD GLUCOSE      POCT Blood Glucose.: 191 mg/dL (20 Jan 2023 12:20)  POCT Blood Glucose.: 216 mg/dL (20 Jan 2023 09:13)  POCT Blood Glucose.: 204 mg/dL (19 Jan 2023 22:05)  POCT Blood Glucose.: 157 mg/dL (19 Jan 2023 18:08)    I&O's Summary      PHYSICAL EXAM:  Vital Signs Last 24 Hrs  T(C): 36.8 (20 Jan 2023 13:09), Max: 37.1 (19 Jan 2023 20:59)  T(F): 98.2 (20 Jan 2023 13:09), Max: 98.7 (19 Jan 2023 20:59)  HR: 71 (20 Jan 2023 13:09) (71 - 85)  BP: 125/69 (20 Jan 2023 13:09) (98/81 - 125/69)  BP(mean): --  RR: 18 (20 Jan 2023 13:09) (17 - 18)  SpO2: 97% (20 Jan 2023 13:09) (97% - 99%)    Parameters below as of 20 Jan 2023 13:09  Patient On (Oxygen Delivery Method): room air      CONSTITUTIONAL: NAD, thin  EYES: Conjunctiva and sclera clear  ENMT: Moist oral mucosa  RESPIRATORY: Normal respiratory effort; lungs are clear to auscultation bilaterally  CARDIOVASCULAR: Regular rate and rhythm, normal S1 and S2, no murmur/rub/gallop; No lower extremity edema  ABDOMEN: Soft, nontender to palpation, normoactive bowel sounds  MUSCULOSKELETAL: L lateral hip area tender to palpation, ROM limited 2/2 pain, hot packs in place  PSYCH: A+O to person, place, and time; affect appropriate  SKIN: No rashes; no palpable lesions      LABS:                        9.7    6.29  )-----------( 174      ( 20 Jan 2023 06:00 )             29.7     01-20    133<L>  |  99  |  13  ----------------------------<  161<H>  4.2   |  24  |  0.50    Ca    9.3      20 Jan 2023 06:00  Phos  3.4     01-20  Mg     1.90     01-20                  RADIOLOGY & ADDITIONAL TESTS:  Results Reviewed:   Imaging Personally Reviewed:  Electrocardiogram Personally Reviewed:    COORDINATION OF CARE:  Care Discussed with Consultants/Other Providers [Y/N]:  Prior or Outpatient Records Reviewed [Y/N]:

## 2023-01-20 NOTE — PROGRESS NOTE ADULT - PROBLEM SELECTOR PLAN 3
- Reviewed with patient that nausea possibly related to underlying neoplasm   - Nausea resolved  - Decrease to PO Zofran 4mg q8 ATC  - IV Decadron 4mg BID  - IV Compazine PRN; encouraged patient to ask for prn doses when having nausea - Reviewed with patient that nausea possibly related to underlying neoplasm   - Nausea resolved  - PO Zofran 4mg q8 ATC  - IV Decadron 4mg BID  - IV Compazine PRN; encouraged patient to ask for prn doses when having nausea

## 2023-01-20 NOTE — PROGRESS NOTE ADULT - PROBLEM SELECTOR PLAN 3
pt ATTEMPTING TO GET UP. STATES LOUDLY "I HAVE TO PEE! RIGHT NOW!" ASSISTED
WITH URINAL. pt INCONT AS WELL. pt REFUSES TO MOVE OR REPOSITION AT THIS TIME.
WHEN pt SAW BLOOD PRESSURE CUFF HE FIRMLY STATED "NOPE" ALLOWED O2 SAT AND
PULSE TO BE TAKEN. REPORTED PAIN, PRN TYLENOL GIVEN, TEMPORAL TEMPERATURE
99.4. pt BECAME MORE AGITATED WHEN STAFF TRIED TO CLEAN UP INCONT VOID. pt IS
UNCOOPERATIVE WITH CARES. STAFF REMAINS DIRECT OBS WITH pt. CHAIR ALARM ON. - likely in setting of known malignancy (chronic disease) and recent chemotherapy  - now improving, normalized WBC and platelet count; anemia likely in setting of neoplastic disease  - continue to trend

## 2023-01-21 LAB
ANION GAP SERPL CALC-SCNC: 14 MMOL/L — SIGNIFICANT CHANGE UP (ref 7–14)
BUN SERPL-MCNC: 14 MG/DL — SIGNIFICANT CHANGE UP (ref 7–23)
CALCIUM SERPL-MCNC: 9.6 MG/DL — SIGNIFICANT CHANGE UP (ref 8.4–10.5)
CHLORIDE SERPL-SCNC: 98 MMOL/L — SIGNIFICANT CHANGE UP (ref 98–107)
CO2 SERPL-SCNC: 21 MMOL/L — LOW (ref 22–31)
CREAT SERPL-MCNC: 0.45 MG/DL — LOW (ref 0.5–1.3)
EGFR: 112 ML/MIN/1.73M2 — SIGNIFICANT CHANGE UP
GLUCOSE SERPL-MCNC: 145 MG/DL — HIGH (ref 70–99)
HCT VFR BLD CALC: 32.2 % — LOW (ref 34.5–45)
HGB BLD-MCNC: 10.3 G/DL — LOW (ref 11.5–15.5)
MAGNESIUM SERPL-MCNC: 2 MG/DL — SIGNIFICANT CHANGE UP (ref 1.6–2.6)
MCHC RBC-ENTMCNC: 24.8 PG — LOW (ref 27–34)
MCHC RBC-ENTMCNC: 32 GM/DL — SIGNIFICANT CHANGE UP (ref 32–36)
MCV RBC AUTO: 77.4 FL — LOW (ref 80–100)
NRBC # BLD: 0 /100 WBCS — SIGNIFICANT CHANGE UP (ref 0–0)
NRBC # FLD: 0 K/UL — SIGNIFICANT CHANGE UP (ref 0–0)
OSMOLALITY UR: 236 MOSM/KG — SIGNIFICANT CHANGE UP (ref 50–1200)
PHOSPHATE SERPL-MCNC: 3.9 MG/DL — SIGNIFICANT CHANGE UP (ref 2.5–4.5)
PLATELET # BLD AUTO: 183 K/UL — SIGNIFICANT CHANGE UP (ref 150–400)
POTASSIUM SERPL-MCNC: 4.3 MMOL/L — SIGNIFICANT CHANGE UP (ref 3.5–5.3)
POTASSIUM SERPL-SCNC: 4.3 MMOL/L — SIGNIFICANT CHANGE UP (ref 3.5–5.3)
RBC # BLD: 4.16 M/UL — SIGNIFICANT CHANGE UP (ref 3.8–5.2)
RBC # FLD: 14.7 % — HIGH (ref 10.3–14.5)
SODIUM SERPL-SCNC: 133 MMOL/L — LOW (ref 135–145)
SODIUM UR-SCNC: 37 MMOL/L — SIGNIFICANT CHANGE UP
WBC # BLD: 6.52 K/UL — SIGNIFICANT CHANGE UP (ref 3.8–10.5)
WBC # FLD AUTO: 6.52 K/UL — SIGNIFICANT CHANGE UP (ref 3.8–10.5)

## 2023-01-21 PROCEDURE — 99233 SBSQ HOSP IP/OBS HIGH 50: CPT

## 2023-01-21 RX ORDER — OXYCODONE HYDROCHLORIDE 5 MG/1
10 TABLET ORAL EVERY 6 HOURS
Refills: 0 | Status: DISCONTINUED | OUTPATIENT
Start: 2023-01-21 | End: 2023-01-21

## 2023-01-21 RX ORDER — LIDOCAINE 4 G/100G
1 CREAM TOPICAL DAILY
Refills: 0 | Status: DISCONTINUED | OUTPATIENT
Start: 2023-01-21 | End: 2023-02-03

## 2023-01-21 RX ORDER — OXYCODONE HYDROCHLORIDE 5 MG/1
15 TABLET ORAL EVERY 6 HOURS
Refills: 0 | Status: DISCONTINUED | OUTPATIENT
Start: 2023-01-21 | End: 2023-01-25

## 2023-01-21 RX ORDER — INSULIN LISPRO 100/ML
1 VIAL (ML) SUBCUTANEOUS
Refills: 0 | Status: DISCONTINUED | OUTPATIENT
Start: 2023-01-21 | End: 2023-02-03

## 2023-01-21 RX ADMIN — OXYCODONE HYDROCHLORIDE 5 MILLIGRAM(S): 5 TABLET ORAL at 06:28

## 2023-01-21 RX ADMIN — Medication 5 MILLIGRAM(S): at 22:04

## 2023-01-21 RX ADMIN — MONTELUKAST 10 MILLIGRAM(S): 4 TABLET, CHEWABLE ORAL at 22:04

## 2023-01-21 RX ADMIN — OXYCODONE HYDROCHLORIDE 5 MILLIGRAM(S): 5 TABLET ORAL at 07:28

## 2023-01-21 RX ADMIN — PANTOPRAZOLE SODIUM 40 MILLIGRAM(S): 20 TABLET, DELAYED RELEASE ORAL at 05:23

## 2023-01-21 RX ADMIN — GABAPENTIN 300 MILLIGRAM(S): 400 CAPSULE ORAL at 22:03

## 2023-01-21 RX ADMIN — OXYCODONE HYDROCHLORIDE 20 MILLIGRAM(S): 5 TABLET ORAL at 06:22

## 2023-01-21 RX ADMIN — OXYCODONE HYDROCHLORIDE 20 MILLIGRAM(S): 5 TABLET ORAL at 14:13

## 2023-01-21 RX ADMIN — OXYCODONE HYDROCHLORIDE 20 MILLIGRAM(S): 5 TABLET ORAL at 23:03

## 2023-01-21 RX ADMIN — Medication 4: at 22:40

## 2023-01-21 RX ADMIN — OXYCODONE HYDROCHLORIDE 20 MILLIGRAM(S): 5 TABLET ORAL at 05:22

## 2023-01-21 RX ADMIN — LIDOCAINE 1 PATCH: 4 CREAM TOPICAL at 18:21

## 2023-01-21 RX ADMIN — ENOXAPARIN SODIUM 40 MILLIGRAM(S): 100 INJECTION SUBCUTANEOUS at 17:45

## 2023-01-21 RX ADMIN — Medication 4 MILLIGRAM(S): at 17:43

## 2023-01-21 RX ADMIN — GABAPENTIN 300 MILLIGRAM(S): 400 CAPSULE ORAL at 13:08

## 2023-01-21 RX ADMIN — ONDANSETRON 4 MILLIGRAM(S): 8 TABLET, FILM COATED ORAL at 13:09

## 2023-01-21 RX ADMIN — ONDANSETRON 4 MILLIGRAM(S): 8 TABLET, FILM COATED ORAL at 22:03

## 2023-01-21 RX ADMIN — LIDOCAINE 1 PATCH: 4 CREAM TOPICAL at 11:33

## 2023-01-21 RX ADMIN — OXYCODONE HYDROCHLORIDE 20 MILLIGRAM(S): 5 TABLET ORAL at 22:03

## 2023-01-21 RX ADMIN — GABAPENTIN 300 MILLIGRAM(S): 400 CAPSULE ORAL at 05:23

## 2023-01-21 RX ADMIN — Medication 1 UNIT(S): at 17:44

## 2023-01-21 RX ADMIN — METHADONE HYDROCHLORIDE 5 MILLIGRAM(S): 40 TABLET ORAL at 17:44

## 2023-01-21 RX ADMIN — MORPHINE SULFATE 2 MILLIGRAM(S): 50 CAPSULE, EXTENDED RELEASE ORAL at 16:03

## 2023-01-21 RX ADMIN — OXYCODONE HYDROCHLORIDE 5 MILLIGRAM(S): 5 TABLET ORAL at 18:21

## 2023-01-21 RX ADMIN — OXYCODONE HYDROCHLORIDE 5 MILLIGRAM(S): 5 TABLET ORAL at 01:10

## 2023-01-21 RX ADMIN — OXYCODONE HYDROCHLORIDE 20 MILLIGRAM(S): 5 TABLET ORAL at 09:28

## 2023-01-21 RX ADMIN — OXYCODONE HYDROCHLORIDE 5 MILLIGRAM(S): 5 TABLET ORAL at 12:20

## 2023-01-21 RX ADMIN — OXYCODONE HYDROCHLORIDE 5 MILLIGRAM(S): 5 TABLET ORAL at 00:10

## 2023-01-21 RX ADMIN — ONDANSETRON 4 MILLIGRAM(S): 8 TABLET, FILM COATED ORAL at 05:23

## 2023-01-21 RX ADMIN — Medication 1 TABLET(S): at 11:34

## 2023-01-21 RX ADMIN — CHLORHEXIDINE GLUCONATE 1 APPLICATION(S): 213 SOLUTION TOPICAL at 11:34

## 2023-01-21 RX ADMIN — Medication 3 MILLIGRAM(S): at 22:03

## 2023-01-21 RX ADMIN — OXYCODONE HYDROCHLORIDE 20 MILLIGRAM(S): 5 TABLET ORAL at 13:58

## 2023-01-21 RX ADMIN — POLYETHYLENE GLYCOL 3350 17 GRAM(S): 17 POWDER, FOR SOLUTION ORAL at 17:45

## 2023-01-21 RX ADMIN — MORPHINE SULFATE 2 MILLIGRAM(S): 50 CAPSULE, EXTENDED RELEASE ORAL at 16:18

## 2023-01-21 RX ADMIN — Medication 1 UNIT(S): at 13:08

## 2023-01-21 RX ADMIN — OXYCODONE HYDROCHLORIDE 20 MILLIGRAM(S): 5 TABLET ORAL at 10:00

## 2023-01-21 RX ADMIN — OXYCODONE HYDROCHLORIDE 5 MILLIGRAM(S): 5 TABLET ORAL at 17:44

## 2023-01-21 RX ADMIN — METHADONE HYDROCHLORIDE 5 MILLIGRAM(S): 40 TABLET ORAL at 05:22

## 2023-01-21 RX ADMIN — Medication 4 MILLIGRAM(S): at 05:23

## 2023-01-21 RX ADMIN — LIDOCAINE 1 PATCH: 4 CREAM TOPICAL at 23:25

## 2023-01-21 RX ADMIN — OXYCODONE HYDROCHLORIDE 5 MILLIGRAM(S): 5 TABLET ORAL at 11:33

## 2023-01-21 RX ADMIN — SENNA PLUS 2 TABLET(S): 8.6 TABLET ORAL at 22:05

## 2023-01-21 RX ADMIN — Medication 8: at 08:52

## 2023-01-21 RX ADMIN — POLYETHYLENE GLYCOL 3350 17 GRAM(S): 17 POWDER, FOR SOLUTION ORAL at 05:24

## 2023-01-21 RX ADMIN — Medication 400 UNIT(S): at 11:33

## 2023-01-21 NOTE — PROGRESS NOTE ADULT - PROBLEM SELECTOR PLAN 2
- pt with hx pancreatic CA f/w Dr. 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

## 2023-01-21 NOTE — CHART NOTE - NSCHARTNOTEFT_GEN_A_CORE
Patient with pain not well managed on current regimen. Standing Oxy added 1/20. Of note, patient has not been requesting PRNs consistently overnight and seems to wake up with increased levels of pain. Provider educated patient and daughter at bedside, also requested that RN continue to offer PRNs as able to.    ADDENDUM: Patient continuing to have pain despite standing and PRN Oxy. Palliative care paged for assistance. Recommendations pending. Patient with pain not well managed on current regimen. Standing Oxy added 1/20. Of note, patient has not been requesting PRNs consistently overnight and seems to wake up with increased levels of pain. Provider educated patient and daughter at bedside, also requested that RN continue to offer PRNs as able to.    ADDENDUM: Patient continuing to have pain despite standing and PRN Oxy. Palliative care paged for assistance. Recommendations pending.    ADDENDUM: Per discussion with palliative, increased standing Oxy to 10mg. Palliative to see patient tomorrow 1/22. Patient with pain not well managed on current regimen. Standing Oxy added 1/20. Of note, patient has not been requesting PRNs consistently overnight and seems to wake up with increased levels of pain. Provider educated patient and daughter at bedside, also requested that RN continue to offer PRNs as able to.    ADDENDUM: Patient continuing to have pain despite standing and PRN Oxy. Palliative care paged for assistance. Recommendations pending.    ADDENDUM: Per discussion with palliative, increased standing Oxy to 15mg. Palliative to see patient tomorrow 1/22.

## 2023-01-21 NOTE — PROGRESS NOTE ADULT - SUBJECTIVE AND OBJECTIVE BOX
Patient is a 58y old  Female who presents with a chief complaint of Difficulty ambulating (20 Jan 2023 14:53)      SUBJECTIVE / OVERNIGHT EVENTS: No acute events overnight. No improvement in her pain. Reports some diminished hearing in L ear for the past 2 weeks    ADDITIONAL REVIEW OF SYSTEMS:    MEDICATIONS  (STANDING):  bisacodyl 5 milliGRAM(s) Oral at bedtime  chlorhexidine 2% Cloths 1 Application(s) Topical daily  cholecalciferol 400 Unit(s) Oral daily  dexAMETHasone  Injectable 4 milliGRAM(s) IV Push two times a day  dextrose 5%. 1000 milliLiter(s) (100 mL/Hr) IV Continuous <Continuous>  dextrose 5%. 1000 milliLiter(s) (50 mL/Hr) IV Continuous <Continuous>  dextrose 50% Injectable 25 Gram(s) IV Push once  dextrose 50% Injectable 12.5 Gram(s) IV Push once  dextrose 50% Injectable 25 Gram(s) IV Push once  enoxaparin Injectable 40 milliGRAM(s) SubCutaneous every 24 hours  gabapentin 300 milliGRAM(s) Oral three times a day  glucagon  Injectable 1 milliGRAM(s) IntraMuscular once  influenza   Vaccine 0.5 milliLiter(s) IntraMuscular once  insulin lispro (ADMELOG) corrective regimen sliding scale   SubCutaneous three times a day before meals  insulin lispro (ADMELOG) corrective regimen sliding scale   SubCutaneous at bedtime  insulin lispro Injectable (ADMELOG) 1 Unit(s) SubCutaneous three times a day before meals  lidocaine   4% Patch 1 Patch Transdermal daily  melatonin 3 milliGRAM(s) Oral at bedtime  methadone    Tablet 5 milliGRAM(s) Oral every 12 hours  montelukast 10 milliGRAM(s) Oral at bedtime  morphine  - Injectable 2 milliGRAM(s) IV Push once  multivitamin 1 Tablet(s) Oral daily  ondansetron   Disintegrating Tablet 4 milliGRAM(s) Oral every 8 hours  oxyCODONE    IR 5 milliGRAM(s) Oral every 6 hours  pantoprazole    Tablet 40 milliGRAM(s) Oral before breakfast  polyethylene glycol 3350 17 Gram(s) Oral two times a day  senna 2 Tablet(s) Oral at bedtime    MEDICATIONS  (PRN):  acetaminophen     Tablet .. 650 milliGRAM(s) Oral every 6 hours PRN Temp greater or equal to 38C (100.4F), Mild Pain (1 - 3)  dextrose Oral Gel 15 Gram(s) Oral once PRN Blood Glucose LESS THAN 70 milliGRAM(s)/deciliter  meclizine 12.5 milliGRAM(s) Oral every 6 hours PRN Dizziness  naloxone 1 mG/mL Injection for Intranasal Use 0.4 milliGRAM(s) IntraNasal every 3 minutes PRN overdose  oxyCODONE    IR 20 milliGRAM(s) Oral every 4 hours PRN Severe Pain (7 - 10)  oxyCODONE    IR 15 milliGRAM(s) Oral every 4 hours PRN Moderate Pain (4 - 6)  prochlorperazine   Injectable 5 milliGRAM(s) IV Push every 8 hours PRN refractory nausea      CAPILLARY BLOOD GLUCOSE      POCT Blood Glucose.: 117 mg/dL (21 Jan 2023 12:34)  POCT Blood Glucose.: 313 mg/dL (21 Jan 2023 08:50)  POCT Blood Glucose.: 166 mg/dL (20 Jan 2023 22:20)  POCT Blood Glucose.: 243 mg/dL (20 Jan 2023 17:46)    I&O's Summary    21 Jan 2023 07:01  -  21 Jan 2023 13:14  --------------------------------------------------------  IN: 0 mL / OUT: 800 mL / NET: -800 mL        PHYSICAL EXAM:  Vital Signs Last 24 Hrs  T(C): 36.7 (21 Jan 2023 12:58), Max: 37.1 (20 Jan 2023 21:20)  T(F): 98.1 (21 Jan 2023 12:58), Max: 98.7 (20 Jan 2023 21:20)  HR: 71 (21 Jan 2023 12:58) (60 - 71)  BP: 123/65 (21 Jan 2023 12:58) (111/65 - 134/65)  BP(mean): --  RR: 17 (21 Jan 2023 12:58) (16 - 17)  SpO2: 100% (21 Jan 2023 12:58) (95% - 100%)    Parameters below as of 21 Jan 2023 12:58  Patient On (Oxygen Delivery Method): room air      CONSTITUTIONAL: NAD, thin  EYES: Conjunctiva and sclera clear  ENMT: Moist oral mucosa  RESPIRATORY: Normal respiratory effort; lungs are clear to auscultation bilaterally  CARDIOVASCULAR: Regular rate and rhythm, normal S1 and S2, no murmur/rub/gallop; No lower extremity edema  ABDOMEN: Soft, nontender to palpation, normoactive bowel sounds  MUSCULOSKELETAL: L lateral hip area tender to palpation, ROM limited 2/2 pain, hot packs in place  PSYCH: A+O to person, place, and time; affect appropriate  SKIN: No rashes; no palpable lesions      LABS:                        10.3   6.52  )-----------( 183      ( 21 Jan 2023 06:30 )             32.2     01-21    133<L>  |  98  |  14  ----------------------------<  145<H>  4.3   |  21<L>  |  0.45<L>    Ca    9.6      21 Jan 2023 06:30  Phos  3.9     01-21  Mg     2.00     01-21                  RADIOLOGY & ADDITIONAL TESTS:  Results Reviewed:   Imaging Personally Reviewed:  Electrocardiogram Personally Reviewed:    COORDINATION OF CARE:  Care Discussed with Consultants/Other Providers [Y/N]:  Prior or Outpatient Records Reviewed [Y/N]:

## 2023-01-21 NOTE — PROGRESS NOTE ADULT - PROBLEM SELECTOR PLAN 1
P/w 1mo progressive LLE pain and difficulty ambulating x1wk; likely in setting of known L femur metastatic disease  - XR L knee/hip/pelvis/femur neg acute fx  - pain control per palliative: 2.5 mg methadone q8h, oxycodone 15mg q4h PRN for mod pain, 20 mg q4h for severe pain- encouraged pt to ask for meds as needed every q4h  - added Decadron, increase to 4mg IV BID  - Started standing oxycodone 5mg q6 x3 days. Also can give dose IV Morphine prior to going for RT. Pain still not controlled, will f/u with palliative care for further recs   - bowel regimen while on opioids- last BM on 1/18  - MRI 1/5 completed, no surgical intervention. Plan for RT 5 sessions through 1/23  - evaluated by ortho, no plan for surgical intervention if there is no fracture, can follow up outpatient if pain persists after palliative RT

## 2023-01-22 LAB
ANION GAP SERPL CALC-SCNC: 12 MMOL/L — SIGNIFICANT CHANGE UP (ref 7–14)
BUN SERPL-MCNC: 13 MG/DL — SIGNIFICANT CHANGE UP (ref 7–23)
CALCIUM SERPL-MCNC: 9.7 MG/DL — SIGNIFICANT CHANGE UP (ref 8.4–10.5)
CHLORIDE SERPL-SCNC: 97 MMOL/L — LOW (ref 98–107)
CO2 SERPL-SCNC: 25 MMOL/L — SIGNIFICANT CHANGE UP (ref 22–31)
CREAT SERPL-MCNC: 0.47 MG/DL — LOW (ref 0.5–1.3)
EGFR: 111 ML/MIN/1.73M2 — SIGNIFICANT CHANGE UP
GLUCOSE SERPL-MCNC: 126 MG/DL — HIGH (ref 70–99)
HCT VFR BLD CALC: 33 % — LOW (ref 34.5–45)
HGB BLD-MCNC: 10.7 G/DL — LOW (ref 11.5–15.5)
MAGNESIUM SERPL-MCNC: 1.9 MG/DL — SIGNIFICANT CHANGE UP (ref 1.6–2.6)
MCHC RBC-ENTMCNC: 24.9 PG — LOW (ref 27–34)
MCHC RBC-ENTMCNC: 32.4 GM/DL — SIGNIFICANT CHANGE UP (ref 32–36)
MCV RBC AUTO: 76.7 FL — LOW (ref 80–100)
NRBC # BLD: 0 /100 WBCS — SIGNIFICANT CHANGE UP (ref 0–0)
NRBC # FLD: 0 K/UL — SIGNIFICANT CHANGE UP (ref 0–0)
PHOSPHATE SERPL-MCNC: 3.5 MG/DL — SIGNIFICANT CHANGE UP (ref 2.5–4.5)
PLATELET # BLD AUTO: 202 K/UL — SIGNIFICANT CHANGE UP (ref 150–400)
POTASSIUM SERPL-MCNC: 4.6 MMOL/L — SIGNIFICANT CHANGE UP (ref 3.5–5.3)
POTASSIUM SERPL-SCNC: 4.6 MMOL/L — SIGNIFICANT CHANGE UP (ref 3.5–5.3)
RBC # BLD: 4.3 M/UL — SIGNIFICANT CHANGE UP (ref 3.8–5.2)
RBC # FLD: 14.7 % — HIGH (ref 10.3–14.5)
SODIUM SERPL-SCNC: 134 MMOL/L — LOW (ref 135–145)
WBC # BLD: 8.01 K/UL — SIGNIFICANT CHANGE UP (ref 3.8–10.5)
WBC # FLD AUTO: 8.01 K/UL — SIGNIFICANT CHANGE UP (ref 3.8–10.5)

## 2023-01-22 PROCEDURE — 99233 SBSQ HOSP IP/OBS HIGH 50: CPT

## 2023-01-22 RX ADMIN — OXYCODONE HYDROCHLORIDE 15 MILLIGRAM(S): 5 TABLET ORAL at 06:51

## 2023-01-22 RX ADMIN — Medication 2: at 13:42

## 2023-01-22 RX ADMIN — ENOXAPARIN SODIUM 40 MILLIGRAM(S): 100 INJECTION SUBCUTANEOUS at 18:00

## 2023-01-22 RX ADMIN — OXYCODONE HYDROCHLORIDE 20 MILLIGRAM(S): 5 TABLET ORAL at 10:55

## 2023-01-22 RX ADMIN — PANTOPRAZOLE SODIUM 40 MILLIGRAM(S): 20 TABLET, DELAYED RELEASE ORAL at 05:51

## 2023-01-22 RX ADMIN — OXYCODONE HYDROCHLORIDE 15 MILLIGRAM(S): 5 TABLET ORAL at 00:39

## 2023-01-22 RX ADMIN — OXYCODONE HYDROCHLORIDE 15 MILLIGRAM(S): 5 TABLET ORAL at 12:01

## 2023-01-22 RX ADMIN — OXYCODONE HYDROCHLORIDE 20 MILLIGRAM(S): 5 TABLET ORAL at 23:06

## 2023-01-22 RX ADMIN — MONTELUKAST 10 MILLIGRAM(S): 4 TABLET, CHEWABLE ORAL at 22:07

## 2023-01-22 RX ADMIN — METHADONE HYDROCHLORIDE 5 MILLIGRAM(S): 40 TABLET ORAL at 17:59

## 2023-01-22 RX ADMIN — CHLORHEXIDINE GLUCONATE 1 APPLICATION(S): 213 SOLUTION TOPICAL at 12:01

## 2023-01-22 RX ADMIN — OXYCODONE HYDROCHLORIDE 15 MILLIGRAM(S): 5 TABLET ORAL at 01:39

## 2023-01-22 RX ADMIN — LIDOCAINE 1 PATCH: 4 CREAM TOPICAL at 12:00

## 2023-01-22 RX ADMIN — Medication 400 UNIT(S): at 12:00

## 2023-01-22 RX ADMIN — OXYCODONE HYDROCHLORIDE 15 MILLIGRAM(S): 5 TABLET ORAL at 18:35

## 2023-01-22 RX ADMIN — Medication 2: at 22:08

## 2023-01-22 RX ADMIN — Medication 4 MILLIGRAM(S): at 05:50

## 2023-01-22 RX ADMIN — OXYCODONE HYDROCHLORIDE 15 MILLIGRAM(S): 5 TABLET ORAL at 12:40

## 2023-01-22 RX ADMIN — ONDANSETRON 4 MILLIGRAM(S): 8 TABLET, FILM COATED ORAL at 22:08

## 2023-01-22 RX ADMIN — ONDANSETRON 4 MILLIGRAM(S): 8 TABLET, FILM COATED ORAL at 05:50

## 2023-01-22 RX ADMIN — LIDOCAINE 1 PATCH: 4 CREAM TOPICAL at 18:35

## 2023-01-22 RX ADMIN — Medication 1 TABLET(S): at 12:01

## 2023-01-22 RX ADMIN — OXYCODONE HYDROCHLORIDE 15 MILLIGRAM(S): 5 TABLET ORAL at 05:51

## 2023-01-22 RX ADMIN — POLYETHYLENE GLYCOL 3350 17 GRAM(S): 17 POWDER, FOR SOLUTION ORAL at 18:00

## 2023-01-22 RX ADMIN — Medication 4 MILLIGRAM(S): at 17:59

## 2023-01-22 RX ADMIN — GABAPENTIN 300 MILLIGRAM(S): 400 CAPSULE ORAL at 13:43

## 2023-01-22 RX ADMIN — METHADONE HYDROCHLORIDE 5 MILLIGRAM(S): 40 TABLET ORAL at 05:51

## 2023-01-22 RX ADMIN — Medication 5 MILLIGRAM(S): at 22:07

## 2023-01-22 RX ADMIN — Medication 4: at 09:38

## 2023-01-22 RX ADMIN — GABAPENTIN 300 MILLIGRAM(S): 400 CAPSULE ORAL at 22:08

## 2023-01-22 RX ADMIN — Medication 1 UNIT(S): at 09:38

## 2023-01-22 RX ADMIN — OXYCODONE HYDROCHLORIDE 20 MILLIGRAM(S): 5 TABLET ORAL at 22:06

## 2023-01-22 RX ADMIN — GABAPENTIN 300 MILLIGRAM(S): 400 CAPSULE ORAL at 05:51

## 2023-01-22 RX ADMIN — Medication 1 UNIT(S): at 18:00

## 2023-01-22 RX ADMIN — Medication 1 UNIT(S): at 13:43

## 2023-01-22 RX ADMIN — ONDANSETRON 4 MILLIGRAM(S): 8 TABLET, FILM COATED ORAL at 15:31

## 2023-01-22 RX ADMIN — SENNA PLUS 2 TABLET(S): 8.6 TABLET ORAL at 22:07

## 2023-01-22 RX ADMIN — POLYETHYLENE GLYCOL 3350 17 GRAM(S): 17 POWDER, FOR SOLUTION ORAL at 05:52

## 2023-01-22 RX ADMIN — OXYCODONE HYDROCHLORIDE 20 MILLIGRAM(S): 5 TABLET ORAL at 10:22

## 2023-01-22 RX ADMIN — Medication 3 MILLIGRAM(S): at 22:07

## 2023-01-22 RX ADMIN — OXYCODONE HYDROCHLORIDE 15 MILLIGRAM(S): 5 TABLET ORAL at 17:59

## 2023-01-22 NOTE — PROGRESS NOTE ADULT - PROBLEM SELECTOR PLAN 2
- pt with hx pancreatic CA f/w Dr. 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

## 2023-01-22 NOTE — PROGRESS NOTE ADULT - SUBJECTIVE AND OBJECTIVE BOX
Patient is a 58y old  Female who presents with a chief complaint of Difficulty ambulating (21 Jan 2023 13:14)      SUBJECTIVE / OVERNIGHT EVENTS: No acute events overnight. Pt has no new complaints, still having pain but has not been taking prn oxycodone as frequently as prescribed. Son at bedside, informed him and pt that they need to request pain meds for better pain control. Per pall care note, will not change regimen    ADDITIONAL REVIEW OF SYSTEMS:    MEDICATIONS  (STANDING):  bisacodyl 5 milliGRAM(s) Oral at bedtime  chlorhexidine 2% Cloths 1 Application(s) Topical daily  cholecalciferol 400 Unit(s) Oral daily  dexAMETHasone  Injectable 4 milliGRAM(s) IV Push two times a day  dextrose 5%. 1000 milliLiter(s) (50 mL/Hr) IV Continuous <Continuous>  dextrose 5%. 1000 milliLiter(s) (100 mL/Hr) IV Continuous <Continuous>  dextrose 50% Injectable 25 Gram(s) IV Push once  dextrose 50% Injectable 12.5 Gram(s) IV Push once  dextrose 50% Injectable 25 Gram(s) IV Push once  enoxaparin Injectable 40 milliGRAM(s) SubCutaneous every 24 hours  gabapentin 300 milliGRAM(s) Oral three times a day  glucagon  Injectable 1 milliGRAM(s) IntraMuscular once  influenza   Vaccine 0.5 milliLiter(s) IntraMuscular once  insulin lispro (ADMELOG) corrective regimen sliding scale   SubCutaneous three times a day before meals  insulin lispro (ADMELOG) corrective regimen sliding scale   SubCutaneous at bedtime  insulin lispro Injectable (ADMELOG) 1 Unit(s) SubCutaneous three times a day before meals  lidocaine   4% Patch 1 Patch Transdermal daily  melatonin 3 milliGRAM(s) Oral at bedtime  methadone    Tablet 5 milliGRAM(s) Oral every 12 hours  montelukast 10 milliGRAM(s) Oral at bedtime  multivitamin 1 Tablet(s) Oral daily  ondansetron   Disintegrating Tablet 4 milliGRAM(s) Oral every 8 hours  oxyCODONE    IR 15 milliGRAM(s) Oral every 6 hours  pantoprazole    Tablet 40 milliGRAM(s) Oral before breakfast  polyethylene glycol 3350 17 Gram(s) Oral two times a day  senna 2 Tablet(s) Oral at bedtime    MEDICATIONS  (PRN):  acetaminophen     Tablet .. 650 milliGRAM(s) Oral every 6 hours PRN Temp greater or equal to 38C (100.4F), Mild Pain (1 - 3)  dextrose Oral Gel 15 Gram(s) Oral once PRN Blood Glucose LESS THAN 70 milliGRAM(s)/deciliter  meclizine 12.5 milliGRAM(s) Oral every 6 hours PRN Dizziness  naloxone 1 mG/mL Injection for Intranasal Use 0.4 milliGRAM(s) IntraNasal every 3 minutes PRN overdose  oxyCODONE    IR 15 milliGRAM(s) Oral every 4 hours PRN Moderate Pain (4 - 6)  oxyCODONE    IR 20 milliGRAM(s) Oral every 4 hours PRN Severe Pain (7 - 10)  prochlorperazine   Injectable 5 milliGRAM(s) IV Push every 8 hours PRN refractory nausea      CAPILLARY BLOOD GLUCOSE      POCT Blood Glucose.: 168 mg/dL (22 Jan 2023 13:21)  POCT Blood Glucose.: 214 mg/dL (22 Jan 2023 09:10)  POCT Blood Glucose.: 314 mg/dL (21 Jan 2023 22:08)  POCT Blood Glucose.: 141 mg/dL (21 Jan 2023 17:41)    I&O's Summary    21 Jan 2023 07:01  -  22 Jan 2023 07:00  --------------------------------------------------------  IN: 0 mL / OUT: 800 mL / NET: -800 mL        PHYSICAL EXAM:  Vital Signs Last 24 Hrs  T(C): 36.8 (22 Jan 2023 05:25), Max: 36.9 (21 Jan 2023 21:15)  T(F): 98.3 (22 Jan 2023 05:25), Max: 98.4 (21 Jan 2023 21:15)  HR: 66 (22 Jan 2023 05:25) (62 - 66)  BP: 111/68 (22 Jan 2023 05:25) (111/68 - 128/69)  BP(mean): --  RR: 18 (22 Jan 2023 05:25) (18 - 18)  SpO2: 99% (22 Jan 2023 05:25) (98% - 99%)    Parameters below as of 22 Jan 2023 05:25  Patient On (Oxygen Delivery Method): room air      CONSTITUTIONAL: NAD, thin  EYES: Conjunctiva and sclera clear  ENMT: Moist oral mucosa  RESPIRATORY: Normal respiratory effort; lungs are clear to auscultation bilaterally  CARDIOVASCULAR: Regular rate and rhythm, normal S1 and S2, no murmur/rub/gallop; No lower extremity edema  ABDOMEN: Soft, nontender to palpation, normoactive bowel sounds  MUSCULOSKELETAL: L lateral hip area tender to palpation, ROM limited 2/2 pain, hot packs in place  PSYCH: A+O to person, place, and time; affect appropriate  SKIN: No rashes; no palpable lesions    LABS:                        10.7   8.01  )-----------( 202      ( 22 Jan 2023 05:15 )             33.0     01-22    134<L>  |  97<L>  |  13  ----------------------------<  126<H>  4.6   |  25  |  0.47<L>    Ca    9.7      22 Jan 2023 05:15  Phos  3.5     01-22  Mg     1.90     01-22                  RADIOLOGY & ADDITIONAL TESTS:  Results Reviewed:   Imaging Personally Reviewed:  Electrocardiogram Personally Reviewed:    COORDINATION OF CARE:  Care Discussed with Consultants/Other Providers [Y/N]:  Prior or Outpatient Records Reviewed [Y/N]:

## 2023-01-22 NOTE — PROGRESS NOTE ADULT - PROBLEM SELECTOR PLAN 1
Drink plenty of fluids, it will help to flush the infection out of your system.   You can take over the counter Azo/Pyridium for urinary symptoms three times a day with meals for the first two days you are on the antibiotics. It will turn your urine orange. This medication only treats the symptoms, it does not treat the infection.    If you develop fevers, chills, or back pain, go to the ER.    If you were informed that a urine culture would be sent to the lab, you will hear from us within 48-72 hours with the results of your urine culture.    If your symptoms do not resolve or return after completion of your antibiotics, follow up with your PCP.     Patient Education     Understanding Urinary Tract Infections (UTIs)  Most UTIs are caused by bacteria, although they may also be caused by viruses or fungi. Bacteria from the bowel are the most common source of infection. The infection may start because of any of the following:  · Sexual activity. During sex, bacteria can travel from the penis, vagina, or rectum into the urethra.   · Bacteria on the skin outside the rectum may travel into the urethra. This is more common in women since the rectum and urethra are closer to each other than in men. Wiping from front to back after using the toilet and keeping the area clean can help prevent germs from getting to the urethra.  · Blockage of urine flow through the urinary tract. If urine sits too long, germs may start to grow out of control.      Parts of the urinary tract  The infection can occur in any part of the urinary tract.  · The kidneys collect and store urine.  · The ureters carry urine from the kidneys to the bladder.  · The bladder holds urine until you are ready to let it out.  · The urethra carries urine from the bladder out of the body. It is shorter in women, so bacteria can move through it more easily. The urethra is longer in men, so a UTI is less likely to reach the bladder or kidneys in men.  Date Last  Reviewed: 1/1/2017  © 2575-7510 [employer]. [address] [place] [address], [place] [address]. All rights reserved. This information is not intended as a substitute for professional medical care. Always follow your healthcare professional's instructions.            P/w 1mo progressive LLE pain and difficulty ambulating x1wk; likely in setting of known L femur metastatic disease  - XR L knee/hip/pelvis/femur neg acute fx  - pain control per palliative: 2.5 mg methadone q8h, oxycodone 15mg q4h PRN for mod pain, 20 mg q4h for severe pain- encouraged pt to ask for meds as needed every q4h  - added Decadron, increase to 4mg IV BID  - Started standing oxycodone 5mg q6 x3 days. Also can give dose IV Morphine prior to going for RT. Pain still not controlled, will f/u with palliative care for further recs   - bowel regimen while on opioids- last BM on 1/18  - MRI 1/5 completed, no surgical intervention. Plan for RT 5 sessions through 1/23  - evaluated by ortho, no plan for surgical intervention if there is no fracture, can follow up outpatient if pain persists after palliative RT

## 2023-01-22 NOTE — CHART NOTE - NSCHARTNOTEFT_GEN_A_CORE
This report was requested by: Rogerio Denson | Reference #: [id number]    You have not added a NAN number. Keeping your NAN number(s) up to date on the My NAN # page will enable the separation of your prescriptions from others in the search results.    Others' Prescriptions  Patient Name: Mae Mcgregor Date: 1965  Address: [address] [place] [id number]: Female  Rx Written	Rx Dispensed	Drug	Quantity	Days Supply	Prescriber Name	Prescriber Nan #	Payment Method	Dispenser  12/16/2022	12/19/2022	methadone hcl 5 mg tablet	15	30	Raphael, Abee P (MS)	[id number]	Medicaid	[facility]  12/08/2022	12/09/2022	oxycodone hcl (ir) 10 mg tab	60	15	Raphael, Abee P (MS)	[id number]	Medicaid	[facility] [employer] [facility] [employer], [facility].  09/22/2022	09/28/2022	oxycodone hcl (ir) 5 mg tablet	80	20	Leyda Braun)	[id number]	Medicaid	[facility]  08/18/2022	08/22/2022	oxycodone hcl (ir) 5 mg tablet	42	7	Raphael, Abee P (MS)	[id number]	Medicaid	[facility]  06/30/2022	07/01/2022	promethazine-codeine solution	473	12	Raphael, Abee P (MS)	[id number]	[facility], [facility].  03/03/2022	03/04/2022	oxycodone-acetaminophen 5-325 mg tab	25	7	Dillon Bourne	[id number]	Medicaid	[facility], [facility].    Patient Name: Mae LagunasBirth Date: 1965  Address: [address] [place] [id number]: Female  Rx Written	Rx Dispensed	Drug	Quantity	Days Supply	Prescriber Name	Prescriber Nan #	Payment Method	Dispenser  09/27/2022	09/27/2022	promethazine-codeine solution	473ml	12	Jeremie Chisholm	[id number]	[facility]  * - Drugs marked with an asterisk are compound drugs. If the compound drug is made up of more than one controlled substance, then each controlled substance will be a separate row in the

## 2023-01-22 NOTE — CHART NOTE - NSCHARTNOTEFT_GEN_A_CORE
Reached by Primary Team for symptom control  H&P and notes reviewed, case discussed with Attending.   ISTOP checked, Reference # (see prior note)  Patient is being folllowed by MANINDER palliative     HPI: 58 yo F with PMH pancreat CA (mets to L spine, L femur; Cong, chemo ambraxane/gemzar 12/27, radiation ?12/15), T2D, GERD, asthma, and HTN p/w LLE pain x 1mo with difficulty ambulating x1wk. Likely in the setting of known metastatic disease.  Palliative Care following for management of pain/ symptoms     Palliative Pain regimen:   - PO Methadone 5mg BID (QTC on 1/12 - 438) (dose increased on 1/18)   -PO Oxy IR 5mg q6 ATC  - IV Decadron 4mg BID   - PO Gabapentin 300mg TID  - PO Oxy IR 15mg q4 PRN moderate pain   - PO Oxy IR 20mg q4PRN severe pain   - Can consider IV Morphine 2mg PRN prior to RT session   - Bowel regimen while on opiates.  - Narcan PRN.    Nurse on 1/21/23 informed on call palliative team that pt was requiring several PRNs and ATC dose was too low. On review of chart noticed that pt used 5 prns from 1/20/23 -1/21/23, while being on the ATC oxy  IR 5mg q6. The ATC dose was increased to oxy IR 15mg q6.     This morning (1/22/23) followed up with Nurse who stated that pt was not complaining of pain.   Overnight  pt used 3 doses of oxy  IR 20mg q6 PRN for severe pain.     Vital Signs Last 24 Hrs  T(C): 36.8 (22 Jan 2023 05:25), Max: 36.9 (21 Jan 2023 21:15)  T(F): 98.3 (22 Jan 2023 05:25), Max: 98.4 (21 Jan 2023 21:15)  HR: 66 (22 Jan 2023 05:25) (62 - 71)  BP: 111/68 (22 Jan 2023 05:25) (111/68 - 128/69)  BP(mean): --  ABP: --  ABP(mean): --  RR: 18 (22 Jan 2023 05:25) (17 - 18)  SpO2: 99% (22 Jan 2023 05:25) (98% - 100%)    O2 Parameters below as of 22 Jan 2023 05:25  Patient On (Oxygen Delivery Method): room air    Recommendations:   -Palliative team informed nurse and PA that pain regimen (see below) will not be changed and to please continue.   -pain regimen: ATC oxy  IR 5mg q6,  oxy  IR 20mg q6 PRN for severe pain,  oxy IR 15mg q6 prn for moderate pain   -will inform [facility] team of weekend events.   -c/w narcan      Please page [phone] ([facility]) / [phone] (NS) for any questions or further concerns.    BHUPINDER Denson MD  Palliative Fellow Reached by Primary Team for symptom control  H&P and notes reviewed, case discussed with Attending.   ISTOP checked, Reference # (see prior note)  Patient is being folllowed by [facility] palliative     HPI: 56 yo F with PMH pancreat CA (mets to L spine, L femur; Cong, chemo ambraxane/gemzar 12/27, radiation ?12/15), T2D, GERD, asthma, and HTN p/w LLE pain x 1mo with difficulty ambulating x1wk. Likely in the setting of known metastatic disease.  Palliative Care following for management of pain/ symptoms     Palliative Pain regimen:   - PO Methadone 5mg BID (QTC on 1/12 - 438) (dose increased on 1/18)   -PO Oxy IR 5mg q6 ATC  - IV Decadron 4mg BID   - PO Gabapentin 300mg TID  - PO Oxy IR 15mg q4 PRN moderate pain   - PO Oxy IR 20mg q4PRN severe pain   - Can consider IV Morphine 2mg PRN prior to RT session   - Bowel regimen while on opiates.  - Narcan PRN.    Nurse on 1/21/23 informed on call palliative team that pt was requiring several PRNs and ATC dose was too low. On review of chart noticed that pt used 5 prns of 20mg oxy from 1/20/23 -1/21/23, while being on the ATC oxy  IR 5mg q6. The ATC dose was increased to oxy IR 15mg q6.     This morning (1/22/23) followed up with Nurse who stated that pt was not complaining of pain.   Overnight  pt used 3 doses of oxy  IR 20mg q6 PRN for severe pain.     Vital Signs Last 24 Hrs  T(C): 36.8 (22 Jan 2023 05:25), Max: 36.9 (21 Jan 2023 21:15)  T(F): 98.3 (22 Jan 2023 05:25), Max: 98.4 (21 Jan 2023 21:15)  HR: 66 (22 Jan 2023 05:25) (62 - 71)  BP: 111/68 (22 Jan 2023 05:25) (111/68 - 128/69)  BP(mean): --  ABP: --  ABP(mean): --  RR: 18 (22 Jan 2023 05:25) (17 - 18)  SpO2: 99% (22 Jan 2023 05:25) (98% - 100%)    O2 Parameters below as of 22 Jan 2023 05:25  Patient On (Oxygen Delivery Method): room air    Recommendations:   -Palliative team informed nurse and PA that pain regimen (see below) will not be changed and to please continue.   -pain regimen: ATC oxy  IR 15mg q6,  oxy  IR 20mg q6 PRN for severe pain,  oxy IR 15mg q6 prn for moderate pain   -will inform [facility] team of weekend events.   -c/w narcan      Please page [phone] ([facility]) / [phone] (NS) for any questions or further concerns.    BHUPINDER Denson MD  Palliative Fellow    Attending addendum:  The above was discussed with palliative fellow. I agree with the above plan of care as written.  [facility] team to follow up 1/23. Pleas page in the event of acute issues.    Dorota Stokes MD, [facility]; Palliative Care Attending, Ethicist.

## 2023-01-23 LAB
ANION GAP SERPL CALC-SCNC: 13 MMOL/L — SIGNIFICANT CHANGE UP (ref 7–14)
BUN SERPL-MCNC: 15 MG/DL — SIGNIFICANT CHANGE UP (ref 7–23)
CALCIUM SERPL-MCNC: 9.8 MG/DL — SIGNIFICANT CHANGE UP (ref 8.4–10.5)
CHLORIDE SERPL-SCNC: 93 MMOL/L — LOW (ref 98–107)
CO2 SERPL-SCNC: 26 MMOL/L — SIGNIFICANT CHANGE UP (ref 22–31)
CREAT SERPL-MCNC: 0.49 MG/DL — LOW (ref 0.5–1.3)
EGFR: 110 ML/MIN/1.73M2 — SIGNIFICANT CHANGE UP
GLUCOSE BLDC GLUCOMTR-MCNC: 146 MG/DL — HIGH (ref 70–99)
GLUCOSE BLDC GLUCOMTR-MCNC: 253 MG/DL — HIGH (ref 70–99)
GLUCOSE BLDC GLUCOMTR-MCNC: 264 MG/DL — HIGH (ref 70–99)
GLUCOSE BLDC GLUCOMTR-MCNC: 285 MG/DL — HIGH (ref 70–99)
GLUCOSE SERPL-MCNC: 136 MG/DL — HIGH (ref 70–99)
HCT VFR BLD CALC: 34.4 % — LOW (ref 34.5–45)
HGB BLD-MCNC: 11.2 G/DL — LOW (ref 11.5–15.5)
MAGNESIUM SERPL-MCNC: 2 MG/DL — SIGNIFICANT CHANGE UP (ref 1.6–2.6)
MCHC RBC-ENTMCNC: 24.6 PG — LOW (ref 27–34)
MCHC RBC-ENTMCNC: 32.6 GM/DL — SIGNIFICANT CHANGE UP (ref 32–36)
MCV RBC AUTO: 75.6 FL — LOW (ref 80–100)
NRBC # BLD: 0 /100 WBCS — SIGNIFICANT CHANGE UP (ref 0–0)
NRBC # FLD: 0 K/UL — SIGNIFICANT CHANGE UP (ref 0–0)
PHOSPHATE SERPL-MCNC: 3.3 MG/DL — SIGNIFICANT CHANGE UP (ref 2.5–4.5)
PLATELET # BLD AUTO: 239 K/UL — SIGNIFICANT CHANGE UP (ref 150–400)
POTASSIUM SERPL-MCNC: 4 MMOL/L — SIGNIFICANT CHANGE UP (ref 3.5–5.3)
POTASSIUM SERPL-SCNC: 4 MMOL/L — SIGNIFICANT CHANGE UP (ref 3.5–5.3)
RBC # BLD: 4.55 M/UL — SIGNIFICANT CHANGE UP (ref 3.8–5.2)
RBC # FLD: 14.7 % — HIGH (ref 10.3–14.5)
SODIUM SERPL-SCNC: 132 MMOL/L — LOW (ref 135–145)
WBC # BLD: 10.32 K/UL — SIGNIFICANT CHANGE UP (ref 3.8–10.5)
WBC # FLD AUTO: 10.32 K/UL — SIGNIFICANT CHANGE UP (ref 3.8–10.5)

## 2023-01-23 PROCEDURE — 93010 ELECTROCARDIOGRAM REPORT: CPT

## 2023-01-23 PROCEDURE — 99232 SBSQ HOSP IP/OBS MODERATE 35: CPT

## 2023-01-23 PROCEDURE — 99233 SBSQ HOSP IP/OBS HIGH 50: CPT

## 2023-01-23 RX ORDER — INSULIN GLARGINE 100 [IU]/ML
6 INJECTION, SOLUTION SUBCUTANEOUS AT BEDTIME
Refills: 0 | Status: DISCONTINUED | OUTPATIENT
Start: 2023-01-23 | End: 2023-01-25

## 2023-01-23 RX ORDER — MORPHINE SULFATE 50 MG/1
2 CAPSULE, EXTENDED RELEASE ORAL ONCE
Refills: 0 | Status: DISCONTINUED | OUTPATIENT
Start: 2023-01-24 | End: 2023-01-24

## 2023-01-23 RX ORDER — METHADONE HYDROCHLORIDE 40 MG/1
10 TABLET ORAL EVERY 12 HOURS
Refills: 0 | Status: DISCONTINUED | OUTPATIENT
Start: 2023-01-23 | End: 2023-01-26

## 2023-01-23 RX ORDER — MORPHINE SULFATE 50 MG/1
2 CAPSULE, EXTENDED RELEASE ORAL ONCE
Refills: 0 | Status: DISCONTINUED | OUTPATIENT
Start: 2023-01-23 | End: 2023-01-23

## 2023-01-23 RX ADMIN — OXYCODONE HYDROCHLORIDE 15 MILLIGRAM(S): 5 TABLET ORAL at 14:03

## 2023-01-23 RX ADMIN — OXYCODONE HYDROCHLORIDE 15 MILLIGRAM(S): 5 TABLET ORAL at 01:27

## 2023-01-23 RX ADMIN — Medication 1 UNIT(S): at 10:05

## 2023-01-23 RX ADMIN — METHADONE HYDROCHLORIDE 10 MILLIGRAM(S): 40 TABLET ORAL at 18:11

## 2023-01-23 RX ADMIN — MONTELUKAST 10 MILLIGRAM(S): 4 TABLET, CHEWABLE ORAL at 22:19

## 2023-01-23 RX ADMIN — SENNA PLUS 2 TABLET(S): 8.6 TABLET ORAL at 22:19

## 2023-01-23 RX ADMIN — LIDOCAINE 1 PATCH: 4 CREAM TOPICAL at 00:48

## 2023-01-23 RX ADMIN — CHLORHEXIDINE GLUCONATE 1 APPLICATION(S): 213 SOLUTION TOPICAL at 13:10

## 2023-01-23 RX ADMIN — Medication 4 MILLIGRAM(S): at 05:48

## 2023-01-23 RX ADMIN — Medication 2: at 22:17

## 2023-01-23 RX ADMIN — ONDANSETRON 4 MILLIGRAM(S): 8 TABLET, FILM COATED ORAL at 22:19

## 2023-01-23 RX ADMIN — OXYCODONE HYDROCHLORIDE 15 MILLIGRAM(S): 5 TABLET ORAL at 05:47

## 2023-01-23 RX ADMIN — Medication 6: at 13:32

## 2023-01-23 RX ADMIN — ENOXAPARIN SODIUM 40 MILLIGRAM(S): 100 INJECTION SUBCUTANEOUS at 18:12

## 2023-01-23 RX ADMIN — LIDOCAINE 1 PATCH: 4 CREAM TOPICAL at 20:17

## 2023-01-23 RX ADMIN — Medication 1 UNIT(S): at 18:12

## 2023-01-23 RX ADMIN — Medication 1 UNIT(S): at 13:34

## 2023-01-23 RX ADMIN — METHADONE HYDROCHLORIDE 5 MILLIGRAM(S): 40 TABLET ORAL at 05:48

## 2023-01-23 RX ADMIN — OXYCODONE HYDROCHLORIDE 15 MILLIGRAM(S): 5 TABLET ORAL at 18:12

## 2023-01-23 RX ADMIN — Medication 1 TABLET(S): at 13:02

## 2023-01-23 RX ADMIN — Medication 4 MILLIGRAM(S): at 18:15

## 2023-01-23 RX ADMIN — Medication 6: at 10:05

## 2023-01-23 RX ADMIN — Medication 400 UNIT(S): at 13:02

## 2023-01-23 RX ADMIN — GABAPENTIN 300 MILLIGRAM(S): 400 CAPSULE ORAL at 13:02

## 2023-01-23 RX ADMIN — OXYCODONE HYDROCHLORIDE 15 MILLIGRAM(S): 5 TABLET ORAL at 13:09

## 2023-01-23 RX ADMIN — MORPHINE SULFATE 2 MILLIGRAM(S): 50 CAPSULE, EXTENDED RELEASE ORAL at 08:09

## 2023-01-23 RX ADMIN — ONDANSETRON 4 MILLIGRAM(S): 8 TABLET, FILM COATED ORAL at 13:02

## 2023-01-23 RX ADMIN — OXYCODONE HYDROCHLORIDE 15 MILLIGRAM(S): 5 TABLET ORAL at 00:27

## 2023-01-23 RX ADMIN — GABAPENTIN 300 MILLIGRAM(S): 400 CAPSULE ORAL at 05:49

## 2023-01-23 RX ADMIN — GABAPENTIN 300 MILLIGRAM(S): 400 CAPSULE ORAL at 22:19

## 2023-01-23 RX ADMIN — POLYETHYLENE GLYCOL 3350 17 GRAM(S): 17 POWDER, FOR SOLUTION ORAL at 05:51

## 2023-01-23 RX ADMIN — INSULIN GLARGINE 6 UNIT(S): 100 INJECTION, SOLUTION SUBCUTANEOUS at 22:16

## 2023-01-23 RX ADMIN — ONDANSETRON 4 MILLIGRAM(S): 8 TABLET, FILM COATED ORAL at 05:49

## 2023-01-23 RX ADMIN — OXYCODONE HYDROCHLORIDE 15 MILLIGRAM(S): 5 TABLET ORAL at 06:47

## 2023-01-23 RX ADMIN — OXYCODONE HYDROCHLORIDE 20 MILLIGRAM(S): 5 TABLET ORAL at 10:06

## 2023-01-23 RX ADMIN — LIDOCAINE 1 PATCH: 4 CREAM TOPICAL at 13:03

## 2023-01-23 RX ADMIN — PANTOPRAZOLE SODIUM 40 MILLIGRAM(S): 20 TABLET, DELAYED RELEASE ORAL at 05:50

## 2023-01-23 RX ADMIN — OXYCODONE HYDROCHLORIDE 20 MILLIGRAM(S): 5 TABLET ORAL at 11:06

## 2023-01-23 RX ADMIN — Medication 5 MILLIGRAM(S): at 22:19

## 2023-01-23 RX ADMIN — POLYETHYLENE GLYCOL 3350 17 GRAM(S): 17 POWDER, FOR SOLUTION ORAL at 18:16

## 2023-01-23 RX ADMIN — OXYCODONE HYDROCHLORIDE 15 MILLIGRAM(S): 5 TABLET ORAL at 19:12

## 2023-01-23 NOTE — PROGRESS NOTE ADULT - PROBLEM SELECTOR PLAN 3
- Reviewed with patient that nausea possibly related to underlying neoplasm   - Nausea resolved  - PO Zofran 4mg q8 ATC  - IV Decadron 4mg BID  - IV Compazine PRN; encouraged patient to ask for prn doses when having nausea

## 2023-01-23 NOTE — PROGRESS NOTE ADULT - SUBJECTIVE AND OBJECTIVE BOX
INTERVAL HPI/OVERNIGHT EVENTS:  Over the weekend, ATC Oxycodone was increased to 15mg   In past 24 hours, received 2 prn doses of PO Oxy 20mg (in addition to PO Oxycodone 15mg q6 ATC)     SUBJECTIVE AND OBJECTIVE:  Patient seen and examined at bedside. Patient being followed up for pain.   Patient declined Italian ; preferred for daughter at bedside to translate.   Patient states having severe left hip/leg pain over the weekend, mild improvement with increased ATC oxycodone dose.   Last bowel movement yesterday.       Allergies    No Known Allergies    Intolerances      MEDICATIONS  (STANDING):  bisacodyl 5 milliGRAM(s) Oral at bedtime  chlorhexidine 2% Cloths 1 Application(s) Topical daily  cholecalciferol 400 Unit(s) Oral daily  dexAMETHasone  Injectable 4 milliGRAM(s) IV Push two times a day  dextrose 5%. 1000 milliLiter(s) (50 mL/Hr) IV Continuous <Continuous>  dextrose 5%. 1000 milliLiter(s) (100 mL/Hr) IV Continuous <Continuous>  dextrose 50% Injectable 25 Gram(s) IV Push once  dextrose 50% Injectable 12.5 Gram(s) IV Push once  dextrose 50% Injectable 25 Gram(s) IV Push once  enoxaparin Injectable 40 milliGRAM(s) SubCutaneous every 24 hours  gabapentin 300 milliGRAM(s) Oral three times a day  glucagon  Injectable 1 milliGRAM(s) IntraMuscular once  influenza   Vaccine 0.5 milliLiter(s) IntraMuscular once  insulin glargine Injectable (LANTUS) 6 Unit(s) SubCutaneous at bedtime  insulin lispro (ADMELOG) corrective regimen sliding scale   SubCutaneous three times a day before meals  insulin lispro (ADMELOG) corrective regimen sliding scale   SubCutaneous at bedtime  insulin lispro Injectable (ADMELOG) 1 Unit(s) SubCutaneous three times a day before meals  lidocaine   4% Patch 1 Patch Transdermal daily  melatonin 3 milliGRAM(s) Oral at bedtime  methadone    Tablet 10 milliGRAM(s) Oral every 12 hours  montelukast 10 milliGRAM(s) Oral at bedtime  multivitamin 1 Tablet(s) Oral daily  ondansetron   Disintegrating Tablet 4 milliGRAM(s) Oral every 8 hours  oxyCODONE    IR 15 milliGRAM(s) Oral every 6 hours  pantoprazole    Tablet 40 milliGRAM(s) Oral before breakfast  polyethylene glycol 3350 17 Gram(s) Oral two times a day  senna 2 Tablet(s) Oral at bedtime    MEDICATIONS  (PRN):  acetaminophen     Tablet .. 650 milliGRAM(s) Oral every 6 hours PRN Temp greater or equal to 38C (100.4F), Mild Pain (1 - 3)  dextrose Oral Gel 15 Gram(s) Oral once PRN Blood Glucose LESS THAN 70 milliGRAM(s)/deciliter  meclizine 12.5 milliGRAM(s) Oral every 6 hours PRN Dizziness  naloxone 1 mG/mL Injection for Intranasal Use 0.4 milliGRAM(s) IntraNasal every 3 minutes PRN overdose  oxyCODONE    IR 15 milliGRAM(s) Oral every 4 hours PRN Moderate Pain (4 - 6)  oxyCODONE    IR 20 milliGRAM(s) Oral every 4 hours PRN Severe Pain (7 - 10)  prochlorperazine   Injectable 5 milliGRAM(s) IV Push every 8 hours PRN refractory nausea      ITEMS UNCHECKED ARE NOT PRESENT    PRESENT SYMPTOMS: [ ]Unable to self-report due to altered mental status- see [ ] CPOT [ ] PAINADS [ ] RDOS  Source if other than patient:  [ ]Family   [ ]Team     Pain: [ x]yes [ ]no  QOL impact - severe  Location - left thigh                     Aggravating factors - movement   Quality - tingling   Radiation - left lower extremity and lower back  Timing- constant with intermittent worsening episodes   Severity (0-10 scale): 10  Minimal acceptable level / Pain Goal (0-10 scale):  7-8    Dyspnea:                           [ ]Mild [ ]Moderate [ ]Severe  Anxiety:                             [ ]Mild [ ]Moderate [ ]Severe  Agitation:                          [ ]Mild [ ]Moderate [ ]Severe  Fatigue:                             [ ]Mild [ ]Moderate [ ]Severe  Nausea:                             [ ]Mild [ ]Moderate [ ]Severe  Loss of appetite:              [ ]Mild [ ]Moderate [ ]Severe  Constipation:                   [ ]Mild [ ]Moderate [ ]Severe  Diarrhea:                          [ ]Mild [ ]Moderate [ ]Severe    CPOT:    [email or web address]-[email or web address]-Observation-[email or web address])    PCSSQ[Palliative Care Spiritual Screening Question]   Severity (0-10):  Score of 4 or > indicate consideration of Chaplaincy referral.  Chaplaincy Referral: [ ] yes [ ] refused [ ] following [x ] deferred    Caregiver [place]? : [ ] yes [ ] no [ ] Declined [x ] Deferred              Social work referral [ ] Patient & Family Centered Care Referral [ ]     Anticipatory Grief present?:  [x ] yes [ ] no  [ ] Deferred                  Social work referral [x ] Chaplaincy Referral[ ]    Other Symptoms:  [ x]All other review of systems negative     PHYSICAL EXAM:  Vital Signs Last 24 Hrs  T(C): 36.2 (23 Jan 2023 12:47), Max: 36.6 (22 Jan 2023 21:04)  T(F): 97.2 (23 Jan 2023 12:47), Max: 97.9 (23 Jan 2023 05:51)  HR: 75 (23 Jan 2023 12:47) (72 - 80)  BP: 137/67 (23 Jan 2023 12:47) (133/77 - 137/67)  BP(mean): --  RR: 18 (23 Jan 2023 12:47) (18 - 18)  SpO2: 100% (23 Jan 2023 12:47) (100% - 100%)    Parameters below as of 23 Jan 2023 12:47  Patient On (Oxygen Delivery Method): room air      GENERAL:  [x ]Alert  [x ]Oriented x 3  [ ]Lethargic  [ ]Cachexia  [ ]Unarousable  [x ]Verbal  [ ]Non-Verbal    Behavioral:   [ ] Anxiety  [ ] Delirium [ ] Agitation [ x] Calm  [ ] Other  HEENT:  [ x]Normal  [ ] Temporal Wasting  [ ]Dry mouth   [ ]ET Tube/Trach  [ ]Oral lesions  [ ] Mucositis  PULMONARY:   [ x]Clear [ ]Tachypnea  [ ]Audible excessive secretions   [ ]Rhonchi        [ ]Right [ ]Left [ ]Bilateral  [ ]Crackles        [ ]Right [ ]Left [ ]Bilateral  [ ]Wheezing     [ ]Right [ ]Left [ ]Bilateral  [ ]Diminished breath sounds [ ]right [ ]left [ ]bilateral  CARDIOVASCULAR:    [ x]Regular [ ]Irregular [ ]Tachy  [ ]Dmitry [ ]Murmur [ ]Other  GASTROINTESTINAL:  [ x]Soft  [ ]Distended   [ ]+BS  [x ]Non tender [ ]Tender  [ ]PEG [ ]OGT/ NGT  Last BM: 1/23  GENITOURINARY:   [x ]Normal [ ] Incontinent   [ ]Oliguria/Anuria   [ ]Swanson  MUSCULOSKELETAL:   [ ]Normal   [x ]Weakness; left lower extremity movement limited due to pain  [ ]Bed/Wheelchair bound [ ]Edema  [  ] amputation  [  ] contraction  NEUROLOGIC:   [ x]No focal deficits  [ ]Cognitive impairment  [ ]Dysphagia [ ]Dysarthria [ ]Paresis [ ]Other   SKIN: See Nursing Skin Assessment for further details  [x ]Normal    [ ]Rash  [ ]Pressure ulcer(s)       Present on admission [ ]y [ ]n   [  ]  Wound    [  ] hyperpigmentation      CRITICAL CARE:  [ ]Shock Present  [ ]Septic [ ]Cardiogenic [ ]Neurologic [ ]Hypovolemic  [ ]Vasopressors [ ]Inotropes  [ ]Respiratory failure present [ ]Mechanical Ventilation [ ]Non-invasive ventilatory support [ ]High-Flow   [ ]Acute  [ ]Chronic [ ]Hypoxic  [ ]Hypercarbic [ ]Other  [ ]Other organ failure     LABS:                                           11.2   10.32 )-----------( 239      ( 23 Jan 2023 06:05 )             34.4       01-23    132<L>  |  93<L>  |  15  ----------------------------<  136<H>  4.0   |  26  |  0.49<L>    Ca    9.8      23 Jan 2023 06:05  Phos  3.3     01-23  Mg     2.00     01-23        RADIOLOGY & ADDITIONAL STUDIES: reviewed     Protein Calorie Malnutrition Present: [ ]mild [ ]moderate [ ]severe [ ]underweight [ ]morbid obesity  https://www.andeal.org/vault/4250/web/files/ONC/Table_Clinical%20Characteristics%20to%20Document%20Malnutrition-White%20JV%20et%20al%202012.pdf    Height (cm): 149 (12-29-22 @ 22:41), 149 (12-27-22 @ 16:21), 147.3 (10-22-22 @ 08:57)  Weight (kg): 43.5 (12-27-22 @ 16:21), 47 (10-22-22 @ 08:57), 45.9 (07-20-22 @ 11:51)  BMI (kg/m2): 19.6 (12-29-22 @ 22:41), 19.6 (12-27-22 @ 16:21), 21.7 (10-22-22 @ 08:57)    [ ]PPSV2 < or = 30%  [ ]significant weight loss [ ]poor nutritional intake [ ]anasarca   [ ]Artificial Nutrition    REFERRALS:   [ ]Chaplaincy  [ ]Hospice  [ ]Child Life  [ ]Social Work  [ x]Case management [ ]Holistic Therapy

## 2023-01-23 NOTE — PROGRESS NOTE ADULT - PROBLEM SELECTOR PLAN 2
- Pain uncontrolled  - MRI - Metastatic lesions within the left femoral neck and superior articular facet of L5. The lesion in the left femoral neck likely places the patient at increased risk for pathologic fracture. Small left hip effusion.  - Ortho and Neurosurgery recommendations appreciated  - Rad/onc recommendations appreciated - plan for 5 fractions of RT ; RT to be completed by 1/23  - Total from Oxy IR 15mg q6 ATC plus 2 does of Oxy IR 20mg = Total of PO Oxycodone 100mg   ~ MME~150mg --> ~112mg with cross reduction   - Increase to PO Methadone 10mg BID (QTC on 1/23 - 416) (dose increased on 1/23)   - PO Oxy IR 15mg q6 ATC; patient may refuse doses   - IV Decadron 4mg BID   - PO Gabapentin 300mg TID  - PO Oxy IR 15mg q4 PRN moderate pain (hold for hypotension, oversedation, respiratory depression)  - PO Oxy IR 20mg q4PRN severe pain (hold for hypotension, oversedation, respiratory depression)  - Can consider IV Morphine 2mg PRN prior to RT session   - Consider repeat imaging to rule out fracture as patient has been having worsening pain.   - Bowel regimen while on opiates.  - Narcan PRN - Pain uncontrolled  - MRI - Metastatic lesions within the left femoral neck and superior articular facet of L5. The lesion in the left femoral neck likely places the patient at increased risk for pathologic fracture. Small left hip effusion.  - Ortho and Neurosurgery recommendations appreciated  - Rad/onc recommendations appreciated - plan for 5 fractions of RT ; RT to be completed by 1/24  - In past 24 hours, Total from Oxy IR 15mg q6 ATC plus 2 doses of Oxy IR 20mg = Total of PO Oxycodone 100mg   ~ MME~150mg --> ~112mg with cross reduction   - Increase to PO Methadone 10mg BID (QTC on 1/23 - 416) (dose increased on 1/23)   - PO Oxy IR 15mg q6 ATC; patient may refuse doses   - IV Decadron 4mg BID   - PO Gabapentin 300mg TID  - PO Oxy IR 15mg q4 PRN moderate pain (hold for hypotension, oversedation, respiratory depression)  - PO Oxy IR 20mg q4PRN severe pain (hold for hypotension, oversedation, respiratory depression)  - Consider IV Morphine 2mg PRN prior to RT session   - Consider repeat imaging to rule out fracture as patient has been having worsening pain the past few days  - Bowel regimen while on opiates.  - Narcan PRN

## 2023-01-23 NOTE — PROGRESS NOTE ADULT - PROBLEM SELECTOR PLAN 1
P/w 1mo progressive LLE pain and difficulty ambulating x1wk; likely in setting of known L femur metastatic disease  - XR L knee/hip/pelvis/femur neg acute fx  - pain control per palliative: 2.5 mg methadone q8h, oxycodone 15mg q4h PRN for mod pain, 20 mg q4h for severe pain- encouraged pt to ask for meds as needed every q4h  - added Decadron, increase to 4mg IV BID  - Started standing oxycodone 5mg q6 x3 days. Also can give dose IV Morphine prior to going for RT. Pain still not controlled, will f/u with palliative care for further recs  - bowel regimen while on opioids- last BM on 1/18  - MRI 1/5 completed, no surgical intervention. Plan for RT 5 sessions through 1/24  - evaluated by ortho, no plan for surgical intervention if there is no fracture, can follow up outpatient if pain persists after palliative RT P/w 1mo progressive LLE pain and difficulty ambulating x1wk; likely in setting of known L femur metastatic disease  - XR L knee/hip/pelvis/femur neg acute fx  - pain control per palliative: 2.5 mg methadone q8h, oxycodone 15mg q4h PRN for mod pain, 20 mg q4h for severe pain- encouraged pt to ask for meds as needed every q4h  - added Decadron, increase to 4mg IV BID  - Started standing oxycodone 5mg q6 x3 days. Also can give dose IV Morphine prior to going for RT. Pain still not controlled, will f/u with palliative care for further recs - also obtaining repeat plain film to assess for possible fracture  - bowel regimen while on opioids  - MRI 1/5 completed, no surgical intervention. Plan for RT 5 sessions through 1/24  - evaluated by ortho, no plan for surgical intervention if there is no fracture, can follow up outpatient if pain persists after palliative RT

## 2023-01-23 NOTE — PROGRESS NOTE ADULT - PROBLEM SELECTOR PLAN 5
- home meds: Januvia 100mg, metformin 500mg BID  - 10/2022 A1C 7.4%, well controlled  - moderate ISS  - monitor FS qac and qHS  - started on steroids- monitor for steroid-induced hyperglycemia - home meds: Januvia 100mg, metformin 500mg BID  - 10/2022 A1C 7.4%, well controlled  - moderate ISS; start glargine 6u hs  - monitor FS qac and qHS  - started on steroids- monitor for steroid-induced hyperglycemia

## 2023-01-23 NOTE — PROGRESS NOTE ADULT - PROBLEM SELECTOR PLAN 2
- pt with hx pancreatic CA f/w Dr. Katty Navarro @ [facility]; known mets to L spine and L femur, on chemo (Abraxane and gemzar) last dose 12/27 and started radiation to L spine early December x1 session   - plan for inpt RT as above  - oncology following

## 2023-01-23 NOTE — PROGRESS NOTE ADULT - SUBJECTIVE AND OBJECTIVE BOX
Patient is a 58y old  Female who presents with a chief complaint of Difficulty ambulating (22 Jan 2023 13:35)    SUBJECTIVE / OVERNIGHT EVENTS:  Daughter at bedside provided translation; patient declines professional .    Patient still with leg pain. States pain medication helps somewhat but pain comes back.    Reports no f/c/n/v/d/cp/sob.    MEDICATIONS  (STANDING):  bisacodyl 5 milliGRAM(s) Oral at bedtime  chlorhexidine 2% Cloths 1 Application(s) Topical daily  cholecalciferol 400 Unit(s) Oral daily  dexAMETHasone  Injectable 4 milliGRAM(s) IV Push two times a day  dextrose 5%. 1000 milliLiter(s) (100 mL/Hr) IV Continuous <Continuous>  dextrose 5%. 1000 milliLiter(s) (50 mL/Hr) IV Continuous <Continuous>  dextrose 50% Injectable 25 Gram(s) IV Push once  dextrose 50% Injectable 12.5 Gram(s) IV Push once  dextrose 50% Injectable 25 Gram(s) IV Push once  enoxaparin Injectable 40 milliGRAM(s) SubCutaneous every 24 hours  gabapentin 300 milliGRAM(s) Oral three times a day  glucagon  Injectable 1 milliGRAM(s) IntraMuscular once  influenza   Vaccine 0.5 milliLiter(s) IntraMuscular once  insulin lispro (ADMELOG) corrective regimen sliding scale   SubCutaneous three times a day before meals  insulin lispro (ADMELOG) corrective regimen sliding scale   SubCutaneous at bedtime  insulin lispro Injectable (ADMELOG) 1 Unit(s) SubCutaneous three times a day before meals  lidocaine   4% Patch 1 Patch Transdermal daily  melatonin 3 milliGRAM(s) Oral at bedtime  methadone    Tablet 5 milliGRAM(s) Oral every 12 hours  montelukast 10 milliGRAM(s) Oral at bedtime  multivitamin 1 Tablet(s) Oral daily  ondansetron   Disintegrating Tablet 4 milliGRAM(s) Oral every 8 hours  oxyCODONE    IR 15 milliGRAM(s) Oral every 6 hours  pantoprazole    Tablet 40 milliGRAM(s) Oral before breakfast  polyethylene glycol 3350 17 Gram(s) Oral two times a day  senna 2 Tablet(s) Oral at bedtime    MEDICATIONS  (PRN):  acetaminophen     Tablet .. 650 milliGRAM(s) Oral every 6 hours PRN Temp greater or equal to 38C (100.4F), Mild Pain (1 - 3)  dextrose Oral Gel 15 Gram(s) Oral once PRN Blood Glucose LESS THAN 70 milliGRAM(s)/deciliter  meclizine 12.5 milliGRAM(s) Oral every 6 hours PRN Dizziness  naloxone 1 mG/mL Injection for Intranasal Use 0.4 milliGRAM(s) IntraNasal every 3 minutes PRN overdose  oxyCODONE    IR 15 milliGRAM(s) Oral every 4 hours PRN Moderate Pain (4 - 6)  oxyCODONE    IR 20 milliGRAM(s) Oral every 4 hours PRN Severe Pain (7 - 10)  prochlorperazine   Injectable 5 milliGRAM(s) IV Push every 8 hours PRN refractory nausea      PHYSICAL EXAM:  T(C): 36.2 (01-23-23 @ 12:47), Max: 36.6 (01-22-23 @ 21:04)  HR: 75 (01-23-23 @ 12:47) (69 - 80)  BP: 137/67 (01-23-23 @ 12:47) (131/66 - 137/67)  RR: 18 (01-23-23 @ 12:47) (18 - 18)  SpO2: 100% (01-23-23 @ 12:47) (100% - 100%)  I&O's Summary    GENERAL: NAD, well-developed  HEAD:  Atraumatic, Normocephalic, MMM  CHEST/LUNG: No use of accessory muscles, CTAB, breathing non-labored  COR: RR, no mrcg  ABD: Soft, ND/NT, +BS  PSYCH: AAOx3  NEUROLOGY: CN II-XII grossly intact, moving all extremities  SKIN: No rashes or lesions  EXT: wwp, no cce    LABS:  CAPILLARY BLOOD GLUCOSE      POCT Blood Glucose.: 285 mg/dL (23 Jan 2023 12:09)  POCT Blood Glucose.: 253 mg/dL (23 Jan 2023 09:43)  POCT Blood Glucose.: 177 mg/dL (23 Jan 2023 08:17)  POCT Blood Glucose.: 272 mg/dL (22 Jan 2023 22:02)  POCT Blood Glucose.: 116 mg/dL (22 Jan 2023 17:45)  POCT Blood Glucose.: 168 mg/dL (22 Jan 2023 13:21)                          11.2   10.32 )-----------( 239      ( 23 Jan 2023 06:05 )             34.4     01-23    132<L>  |  93<L>  |  15  ----------------------------<  136<H>  4.0   |  26  |  0.49<L>    Ca    9.8      23 Jan 2023 06:05  Phos  3.3     01-23  Mg     2.00     01-23                  RADIOLOGY & ADDITIONAL TESTS:    Telemetry Personally Reviewed -     Imaging Personally Reviewed -     Imaging Reviewed -     Consultant(s) Notes Reviewed -       Care Discussed with Consultants/Other Providers -

## 2023-01-23 NOTE — PROGRESS NOTE ADULT - PROBLEM SELECTOR PLAN 4
Patient discussed during oncology interdisciplinary rounds   Symptom management recommendations discussed with primary team   Thank you for allowing us to participate in your patient's care. Please page [phone] for any questions/concerns.

## 2023-01-24 DIAGNOSIS — S72.002A FRACTURE OF UNSPECIFIED PART OF NECK OF LEFT FEMUR, INITIAL ENCOUNTER FOR CLOSED FRACTURE: ICD-10-CM

## 2023-01-24 LAB
GLUCOSE BLDC GLUCOMTR-MCNC: 125 MG/DL — HIGH (ref 70–99)
GLUCOSE BLDC GLUCOMTR-MCNC: 139 MG/DL — HIGH (ref 70–99)
GLUCOSE BLDC GLUCOMTR-MCNC: 155 MG/DL — HIGH (ref 70–99)
GLUCOSE BLDC GLUCOMTR-MCNC: 231 MG/DL — HIGH (ref 70–99)

## 2023-01-24 PROCEDURE — 99233 SBSQ HOSP IP/OBS HIGH 50: CPT

## 2023-01-24 PROCEDURE — 77427 RADIATION TX MANAGEMENT X5: CPT

## 2023-01-24 PROCEDURE — 73502 X-RAY EXAM HIP UNI 2-3 VIEWS: CPT | Mod: 26,LT

## 2023-01-24 PROCEDURE — 99232 SBSQ HOSP IP/OBS MODERATE 35: CPT

## 2023-01-24 RX ORDER — MORPHINE SULFATE 50 MG/1
1 CAPSULE, EXTENDED RELEASE ORAL ONCE
Refills: 0 | Status: DISCONTINUED | OUTPATIENT
Start: 2023-01-24 | End: 2023-01-24

## 2023-01-24 RX ADMIN — Medication 400 UNIT(S): at 12:39

## 2023-01-24 RX ADMIN — Medication 2: at 09:24

## 2023-01-24 RX ADMIN — METHADONE HYDROCHLORIDE 10 MILLIGRAM(S): 40 TABLET ORAL at 06:48

## 2023-01-24 RX ADMIN — OXYCODONE HYDROCHLORIDE 20 MILLIGRAM(S): 5 TABLET ORAL at 22:55

## 2023-01-24 RX ADMIN — Medication 1 UNIT(S): at 12:38

## 2023-01-24 RX ADMIN — OXYCODONE HYDROCHLORIDE 15 MILLIGRAM(S): 5 TABLET ORAL at 00:10

## 2023-01-24 RX ADMIN — OXYCODONE HYDROCHLORIDE 15 MILLIGRAM(S): 5 TABLET ORAL at 18:54

## 2023-01-24 RX ADMIN — INSULIN GLARGINE 6 UNIT(S): 100 INJECTION, SOLUTION SUBCUTANEOUS at 22:26

## 2023-01-24 RX ADMIN — OXYCODONE HYDROCHLORIDE 15 MILLIGRAM(S): 5 TABLET ORAL at 17:52

## 2023-01-24 RX ADMIN — PANTOPRAZOLE SODIUM 40 MILLIGRAM(S): 20 TABLET, DELAYED RELEASE ORAL at 06:49

## 2023-01-24 RX ADMIN — LIDOCAINE 1 PATCH: 4 CREAM TOPICAL at 01:03

## 2023-01-24 RX ADMIN — ENOXAPARIN SODIUM 40 MILLIGRAM(S): 100 INJECTION SUBCUTANEOUS at 18:26

## 2023-01-24 RX ADMIN — CHLORHEXIDINE GLUCONATE 1 APPLICATION(S): 213 SOLUTION TOPICAL at 12:39

## 2023-01-24 RX ADMIN — ONDANSETRON 4 MILLIGRAM(S): 8 TABLET, FILM COATED ORAL at 22:25

## 2023-01-24 RX ADMIN — ONDANSETRON 4 MILLIGRAM(S): 8 TABLET, FILM COATED ORAL at 13:50

## 2023-01-24 RX ADMIN — SENNA PLUS 2 TABLET(S): 8.6 TABLET ORAL at 22:25

## 2023-01-24 RX ADMIN — OXYCODONE HYDROCHLORIDE 15 MILLIGRAM(S): 5 TABLET ORAL at 06:48

## 2023-01-24 RX ADMIN — Medication 1 UNIT(S): at 09:24

## 2023-01-24 RX ADMIN — GABAPENTIN 300 MILLIGRAM(S): 400 CAPSULE ORAL at 13:50

## 2023-01-24 RX ADMIN — GABAPENTIN 300 MILLIGRAM(S): 400 CAPSULE ORAL at 22:25

## 2023-01-24 RX ADMIN — OXYCODONE HYDROCHLORIDE 20 MILLIGRAM(S): 5 TABLET ORAL at 23:55

## 2023-01-24 RX ADMIN — OXYCODONE HYDROCHLORIDE 15 MILLIGRAM(S): 5 TABLET ORAL at 12:44

## 2023-01-24 RX ADMIN — Medication 1 TABLET(S): at 12:39

## 2023-01-24 RX ADMIN — POLYETHYLENE GLYCOL 3350 17 GRAM(S): 17 POWDER, FOR SOLUTION ORAL at 18:26

## 2023-01-24 RX ADMIN — OXYCODONE HYDROCHLORIDE 15 MILLIGRAM(S): 5 TABLET ORAL at 01:10

## 2023-01-24 RX ADMIN — Medication 3 MILLIGRAM(S): at 22:25

## 2023-01-24 RX ADMIN — METHADONE HYDROCHLORIDE 10 MILLIGRAM(S): 40 TABLET ORAL at 17:52

## 2023-01-24 RX ADMIN — OXYCODONE HYDROCHLORIDE 20 MILLIGRAM(S): 5 TABLET ORAL at 02:36

## 2023-01-24 RX ADMIN — MORPHINE SULFATE 1 MILLIGRAM(S): 50 CAPSULE, EXTENDED RELEASE ORAL at 11:00

## 2023-01-24 RX ADMIN — GABAPENTIN 300 MILLIGRAM(S): 400 CAPSULE ORAL at 06:49

## 2023-01-24 RX ADMIN — Medication 1 UNIT(S): at 18:29

## 2023-01-24 RX ADMIN — OXYCODONE HYDROCHLORIDE 15 MILLIGRAM(S): 5 TABLET ORAL at 07:48

## 2023-01-24 RX ADMIN — MORPHINE SULFATE 2 MILLIGRAM(S): 50 CAPSULE, EXTENDED RELEASE ORAL at 08:03

## 2023-01-24 RX ADMIN — OXYCODONE HYDROCHLORIDE 15 MILLIGRAM(S): 5 TABLET ORAL at 14:06

## 2023-01-24 RX ADMIN — ONDANSETRON 4 MILLIGRAM(S): 8 TABLET, FILM COATED ORAL at 06:49

## 2023-01-24 RX ADMIN — Medication 5 MILLIGRAM(S): at 22:26

## 2023-01-24 RX ADMIN — POLYETHYLENE GLYCOL 3350 17 GRAM(S): 17 POWDER, FOR SOLUTION ORAL at 06:50

## 2023-01-24 RX ADMIN — Medication 4 MILLIGRAM(S): at 06:48

## 2023-01-24 RX ADMIN — MONTELUKAST 10 MILLIGRAM(S): 4 TABLET, CHEWABLE ORAL at 22:25

## 2023-01-24 RX ADMIN — Medication 4: at 12:38

## 2023-01-24 RX ADMIN — MORPHINE SULFATE 1 MILLIGRAM(S): 50 CAPSULE, EXTENDED RELEASE ORAL at 10:29

## 2023-01-24 RX ADMIN — OXYCODONE HYDROCHLORIDE 20 MILLIGRAM(S): 5 TABLET ORAL at 03:36

## 2023-01-24 NOTE — PROGRESS NOTE ADULT - SUBJECTIVE AND OBJECTIVE BOX
Patient is a 58y old  Female who presents with a chief complaint of Difficulty ambulating (23 Jan 2023 16:54)    SUBJECTIVE / OVERNIGHT EVENTS:  Daughter at bedside serving as . Patient declines phone .    No events overnight. This AM, patient without n/v/d/cp/sob.  Still reports L leg pain.    MEDICATIONS  (STANDING):  bisacodyl 5 milliGRAM(s) Oral at bedtime  chlorhexidine 2% Cloths 1 Application(s) Topical daily  cholecalciferol 400 Unit(s) Oral daily  dextrose 5%. 1000 milliLiter(s) (50 mL/Hr) IV Continuous <Continuous>  dextrose 5%. 1000 milliLiter(s) (100 mL/Hr) IV Continuous <Continuous>  dextrose 50% Injectable 25 Gram(s) IV Push once  dextrose 50% Injectable 12.5 Gram(s) IV Push once  dextrose 50% Injectable 25 Gram(s) IV Push once  enoxaparin Injectable 40 milliGRAM(s) SubCutaneous every 24 hours  gabapentin 300 milliGRAM(s) Oral three times a day  glucagon  Injectable 1 milliGRAM(s) IntraMuscular once  influenza   Vaccine 0.5 milliLiter(s) IntraMuscular once  insulin glargine Injectable (LANTUS) 6 Unit(s) SubCutaneous at bedtime  insulin lispro (ADMELOG) corrective regimen sliding scale   SubCutaneous three times a day before meals  insulin lispro (ADMELOG) corrective regimen sliding scale   SubCutaneous at bedtime  insulin lispro Injectable (ADMELOG) 1 Unit(s) SubCutaneous three times a day before meals  lidocaine   4% Patch 1 Patch Transdermal daily  melatonin 3 milliGRAM(s) Oral at bedtime  methadone    Tablet 10 milliGRAM(s) Oral every 12 hours  montelukast 10 milliGRAM(s) Oral at bedtime  multivitamin 1 Tablet(s) Oral daily  ondansetron   Disintegrating Tablet 4 milliGRAM(s) Oral every 8 hours  oxyCODONE    IR 15 milliGRAM(s) Oral every 6 hours  pantoprazole    Tablet 40 milliGRAM(s) Oral before breakfast  polyethylene glycol 3350 17 Gram(s) Oral two times a day  senna 2 Tablet(s) Oral at bedtime    MEDICATIONS  (PRN):  acetaminophen     Tablet .. 650 milliGRAM(s) Oral every 6 hours PRN Temp greater or equal to 38C (100.4F), Mild Pain (1 - 3)  dextrose Oral Gel 15 Gram(s) Oral once PRN Blood Glucose LESS THAN 70 milliGRAM(s)/deciliter  meclizine 12.5 milliGRAM(s) Oral every 6 hours PRN Dizziness  naloxone 1 mG/mL Injection for Intranasal Use 0.4 milliGRAM(s) IntraNasal every 3 minutes PRN overdose  oxyCODONE    IR 20 milliGRAM(s) Oral every 4 hours PRN Severe Pain (7 - 10)  oxyCODONE    IR 15 milliGRAM(s) Oral every 4 hours PRN Moderate Pain (4 - 6)  prochlorperazine   Injectable 5 milliGRAM(s) IV Push every 8 hours PRN refractory nausea      PHYSICAL EXAM:  T(C): 36.7 (01-24-23 @ 05:51), Max: 37.1 (01-23-23 @ 20:08)  HR: 75 (01-24-23 @ 05:51) (72 - 75)  BP: 145/76 (01-24-23 @ 05:51) (119/68 - 145/76)  RR: 17 (01-24-23 @ 05:51) (17 - 18)  SpO2: 100% (01-24-23 @ 05:51) (100% - 100%)  I&O's Summary    GENERAL: NAD, well-developed  HEAD:  Atraumatic, Normocephalic, MMM  CHEST/LUNG: No use of accessory muscles, CTAB, breathing non-labored  COR: RR, no mrcg  ABD: Soft, ND/NT, +BS  PSYCH: AAOx3  NEUROLOGY: CN II-XII grossly intact, moving all extremities  SKIN: No rashes or lesions  EXT: wwp, no cce    LABS:  CAPILLARY BLOOD GLUCOSE      POCT Blood Glucose.: 155 mg/dL (24 Jan 2023 09:01)  POCT Blood Glucose.: 264 mg/dL (23 Jan 2023 22:06)  POCT Blood Glucose.: 146 mg/dL (23 Jan 2023 17:52)  POCT Blood Glucose.: 285 mg/dL (23 Jan 2023 12:09)                          11.2   10.32 )-----------( 239      ( 23 Jan 2023 06:05 )             34.4     01-23    132<L>  |  93<L>  |  15  ----------------------------<  136<H>  4.0   |  26  |  0.49<L>    Ca    9.8      23 Jan 2023 06:05  Phos  3.3     01-23  Mg     2.00     01-23                  RADIOLOGY & ADDITIONAL TESTS:    Telemetry Personally Reviewed -     Imaging Personally Reviewed -     Imaging Reviewed -     Consultant(s) Notes Reviewed -       Care Discussed with Consultants/Other Providers -

## 2023-01-24 NOTE — PROGRESS NOTE ADULT - PROBLEM SELECTOR PLAN 4
- Reviewed with patient that nausea possibly related to underlying neoplasm   - PO Zofran 4mg q8 ATC  - IV Decadron 4mg BID  - IV Compazine PRN

## 2023-01-24 NOTE — PROGRESS NOTE ADULT - PROBLEM SELECTOR PLAN 1
- XRAY - Displaced pathologic left femoral neck fracture. No dislocations or additional fractures.  - Ortho recommendations appreciated - plan for OR on Thursday, 1/26/2023 for left hip hemiarthroplasty.   - pain management as below

## 2023-01-24 NOTE — CHART NOTE - NSCHARTNOTEFT_GEN_A_CORE
Reviewed recent imaging with ortho oncology attending Dr. Villa.  Plan to take patient to OR on Thursday, 1/26/2023 for left hip hemiarthroplasty.   Patient will need medical clearance/optimization for OR.   Preop patient for OR- NPO after midnight on 1/25/2023, AM labs: CBC, BMP, Coags, T&S; Hold AC after midnight prior to OR  Please obtain updated covid swab.  Above reviewed with TIN Iverson. Please notify Ortho with any further questions.

## 2023-01-24 NOTE — PROGRESS NOTE ADULT - SUBJECTIVE AND OBJECTIVE BOX
INTERVAL HPI/OVERNIGHT EVENTS:  In past 24 hours, received 2 prn doses of PO Oxy 20mg (in addition to PO Oxycodone 15mg q6 ATC)     SUBJECTIVE AND OBJECTIVE:  Patient seen and examined at bedside. Patient being followed up for pain.   Patient declined Polish ; preferred for daughter at bedside to translate.   Patient states that pain was mildly better during daytime yesterday, but worse and severe at night.     Allergies    No Known Allergies    Intolerances      MEDICATIONS  (STANDING):  bisacodyl 5 milliGRAM(s) Oral at bedtime  chlorhexidine 2% Cloths 1 Application(s) Topical daily  cholecalciferol 400 Unit(s) Oral daily  dextrose 5%. 1000 milliLiter(s) (50 mL/Hr) IV Continuous <Continuous>  dextrose 5%. 1000 milliLiter(s) (100 mL/Hr) IV Continuous <Continuous>  dextrose 50% Injectable 25 Gram(s) IV Push once  dextrose 50% Injectable 12.5 Gram(s) IV Push once  dextrose 50% Injectable 25 Gram(s) IV Push once  enoxaparin Injectable 40 milliGRAM(s) SubCutaneous every 24 hours  gabapentin 300 milliGRAM(s) Oral three times a day  glucagon  Injectable 1 milliGRAM(s) IntraMuscular once  influenza   Vaccine 0.5 milliLiter(s) IntraMuscular once  insulin glargine Injectable (LANTUS) 6 Unit(s) SubCutaneous at bedtime  insulin lispro (ADMELOG) corrective regimen sliding scale   SubCutaneous at bedtime  insulin lispro (ADMELOG) corrective regimen sliding scale   SubCutaneous three times a day before meals  insulin lispro Injectable (ADMELOG) 1 Unit(s) SubCutaneous three times a day before meals  lidocaine   4% Patch 1 Patch Transdermal daily  melatonin 3 milliGRAM(s) Oral at bedtime  methadone    Tablet 10 milliGRAM(s) Oral every 12 hours  montelukast 10 milliGRAM(s) Oral at bedtime  multivitamin 1 Tablet(s) Oral daily  ondansetron   Disintegrating Tablet 4 milliGRAM(s) Oral every 8 hours  oxyCODONE    IR 15 milliGRAM(s) Oral every 6 hours  pantoprazole    Tablet 40 milliGRAM(s) Oral before breakfast  polyethylene glycol 3350 17 Gram(s) Oral two times a day  senna 2 Tablet(s) Oral at bedtime    MEDICATIONS  (PRN):  acetaminophen     Tablet .. 650 milliGRAM(s) Oral every 6 hours PRN Temp greater or equal to 38C (100.4F), Mild Pain (1 - 3)  dextrose Oral Gel 15 Gram(s) Oral once PRN Blood Glucose LESS THAN 70 milliGRAM(s)/deciliter  meclizine 12.5 milliGRAM(s) Oral every 6 hours PRN Dizziness  naloxone 1 mG/mL Injection for Intranasal Use 0.4 milliGRAM(s) IntraNasal every 3 minutes PRN overdose  oxyCODONE    IR 20 milliGRAM(s) Oral every 4 hours PRN Severe Pain (7 - 10)  oxyCODONE    IR 15 milliGRAM(s) Oral every 4 hours PRN Moderate Pain (4 - 6)  prochlorperazine   Injectable 5 milliGRAM(s) IV Push every 8 hours PRN refractory nausea        ITEMS UNCHECKED ARE NOT PRESENT    PRESENT SYMPTOMS: [ ]Unable to self-report due to altered mental status- see [ ] CPOT [ ] PAINADS [ ] RDOS  Source if other than patient:  [ ]Family   [ ]Team     Pain: [ x]yes [ ]no  QOL impact - severe  Location - left thigh                     Aggravating factors - movement   Quality - tingling   Radiation - left lower extremity and lower back  Timing- constant with intermittent worsening episodes   Severity (0-10 scale): 10  Minimal acceptable level / Pain Goal (0-10 scale):  7-8    Dyspnea:                           [ ]Mild [ ]Moderate [ ]Severe  Anxiety:                             [ ]Mild [ ]Moderate [ ]Severe  Agitation:                          [ ]Mild [ ]Moderate [ ]Severe  Fatigue:                             [ ]Mild [ ]Moderate [ ]Severe  Nausea:                             [ ]Mild [ ]Moderate [ ]Severe  Loss of appetite:              [ ]Mild [ ]Moderate [ ]Severe  Constipation:                   [ ]Mild [ ]Moderate [ ]Severe  Diarrhea:                          [ ]Mild [ ]Moderate [ ]Severe    CPOT:    https://www.[facility].org/getattachment/[email or web address]/Critical-Care-Pain-Observation-Tool-(CPOT)    PCSSQ[Palliative Care Spiritual Screening Question]   Severity (0-10):  Score of 4 or > indicate consideration of Chaplaincy referral.  Chaplaincy Referral: [ ] yes [ ] refused [ ] following [x ] deferred    Caregiver [place]? : [ ] yes [ ] no [ ] Declined [x ] Deferred              Social work referral [ ] Patient & Family Centered Care Referral [ ]     Anticipatory Grief present?:  [x ] yes [ ] no  [ ] Deferred                  Social work referral [x ] Chaplaincy Referral[ ]    Other Symptoms:  [ x]All other review of systems negative     PHYSICAL EXAM:  Vital Signs Last 24 Hrs  T(C): 36.6 (24 Jan 2023 12:44), Max: 37.1 (23 Jan 2023 20:08)  T(F): 97.9 (24 Jan 2023 12:44), Max: 98.7 (23 Jan 2023 20:08)  HR: 76 (24 Jan 2023 12:44) (72 - 76)  BP: 134/74 (24 Jan 2023 12:44) (119/68 - 145/76)  BP(mean): --  RR: 17 (24 Jan 2023 12:44) (17 - 18)  SpO2: 96% (24 Jan 2023 12:44) (96% - 100%)    Parameters below as of 24 Jan 2023 12:44  Patient On (Oxygen Delivery Method): room air    GENERAL:  [x ]Alert  [x ]Oriented x 3  [ ]Lethargic  [ ]Cachexia  [ ]Unarousable  [x ]Verbal  [ ]Non-Verbal    Behavioral:   [ ] Anxiety  [ ] Delirium [ ] Agitation [ x] Calm  [ ] Other  HEENT:  [ x]Normal  [ ] Temporal Wasting  [ ]Dry mouth   [ ]ET Tube/Trach  [ ]Oral lesions  [ ] Mucositis  PULMONARY:   [ x]Clear [ ]Tachypnea  [ ]Audible excessive secretions   [ ]Rhonchi        [ ]Right [ ]Left [ ]Bilateral  [ ]Crackles        [ ]Right [ ]Left [ ]Bilateral  [ ]Wheezing     [ ]Right [ ]Left [ ]Bilateral  [ ]Diminished breath sounds [ ]right [ ]left [ ]bilateral  CARDIOVASCULAR:    [ x]Regular [ ]Irregular [ ]Tachy  [ ]Dmitry [ ]Murmur [ ]Other  GASTROINTESTINAL:  [ x]Soft  [ ]Distended   [ ]+BS  [x ]Non tender [ ]Tender  [ ]PEG [ ]OGT/ NGT  Last BM: 1/24  GENITOURINARY:   [x ]Normal [ ] Incontinent   [ ]Oliguria/Anuria   [ ]Swanson  MUSCULOSKELETAL:   [ ]Normal   [x ]Weakness; left lower extremity movement limited due to pain  [ ]Bed/Wheelchair bound [ ]Edema  [  ] amputation  [  ] contraction  NEUROLOGIC:   [ x]No focal deficits  [ ]Cognitive impairment  [ ]Dysphagia [ ]Dysarthria [ ]Paresis [ ]Other   SKIN: See Nursing Skin Assessment for further details  [x ]Normal    [ ]Rash  [ ]Pressure ulcer(s)       Present on admission [ ]y [ ]n   [  ]  Wound    [  ] hyperpigmentation      CRITICAL CARE:  [ ]Shock Present  [ ]Septic [ ]Cardiogenic [ ]Neurologic [ ]Hypovolemic  [ ]Vasopressors [ ]Inotropes  [ ]Respiratory failure present [ ]Mechanical Ventilation [ ]Non-invasive ventilatory support [ ]High-Flow   [ ]Acute  [ ]Chronic [ ]Hypoxic  [ ]Hypercarbic [ ]Other  [ ]Other organ failure     LABS:                                           11.2   10.32 )-----------( 239      ( 23 Jan 2023 06:05 )             34.4       01-23    132<L>  |  93<L>  |  15  ----------------------------<  136<H>  4.0   |  26  |  0.49<L>    Ca    9.8      23 Jan 2023 06:05  Phos  3.3     01-23  Mg     2.00     01-23        RADIOLOGY & ADDITIONAL STUDIES: reviewed   XRAY Left Hip 1/24/2023  Displaced pathologic left femoral neck fracture. No dislocations or   additional fractures.    No additional radiographically appreciated suspicious focal osseous   lesions.    External urine catch device overlies vulvar region.    Protein Calorie Malnutrition Present: [ ]mild [ ]moderate [ ]severe [ ]underweight [ ]morbid obesity  https://www.andeal.org/vault/2440/web/files/ONC/Table_Clinical%20Characteristics%20to%20Document%20Malnutrition-White%20JV%20et%20al%202012.pdf    Height (cm): 149 (12-29-22 @ 22:41), 149 (12-27-22 @ 16:21), 147.3 (10-22-22 @ 08:57)  Weight (kg): 43.5 (12-27-22 @ 16:21), 47 (10-22-22 @ 08:57), 45.9 (07-20-22 @ 11:51)  BMI (kg/m2): 19.6 (12-29-22 @ 22:41), 19.6 (12-27-22 @ 16:21), 21.7 (10-22-22 @ 08:57)    [ ]PPSV2 < or = 30%  [ ]significant weight loss [ ]poor nutritional intake [ ]anasarca   [ ]Artificial Nutrition    REFERRALS:   [ ]Chaplaincy  [ ]Hospice  [ ]Child Life  [ ]Social Work  [ x]Case management [ ]Holistic Therapy

## 2023-01-24 NOTE — PROGRESS NOTE ADULT - PROBLEM SELECTOR PLAN 5
Patient discussed during oncology interdisciplinary rounds   Symptom management recommendations discussed with primary team   Thank you for allowing us to participate in your patient's care. Please page [phone] for any questions/concerns.

## 2023-01-24 NOTE — PROGRESS NOTE ADULT - NS ATTEST RISK PROBLEM GEN_ALL_CORE FT
In the setting of controlled substance administration, clinical monitoring required for side effects such as respiratory depression, constipation and opioid induced neurotoxicity.

## 2023-01-24 NOTE — PROGRESS NOTE ADULT - ASSESSMENT
58 yo Sylheti-speaking F with PMH pancreatic CA (mets to L spine, L femur; s/p chemo with abraxane/gemzar 12/27, radiation ?12/15; s/p Whipple in 2017), Type 2 DM, GERD, asthma, and HTN p/w LLE pain x 1mo with difficulty ambulating x1wk. Likely in the setting of known metastatic disease. XR showed lucency on the L femoral neck. Course c/b severe pain, N/V, constipation -- palliative following for pain recs. Ortho deferring surgical management of L femur until radiation completed. Rad-onc following, completed  5 sessions of RT.

## 2023-01-24 NOTE — PROGRESS NOTE ADULT - PROBLEM SELECTOR PLAN 2
- pt with hx pancreatic CA f/w Dr. Katty Navarro @ [facility]; known mets to L spine and L femur, on chemo (Abraxane and gemzar) last dose 12/27 and started radiation to L spine early December x1 session   - s/p course of RT this admission  - oncology following

## 2023-01-24 NOTE — PROGRESS NOTE ADULT - PROBLEM SELECTOR PLAN 3
- Pain uncontrolled  - s/p 5 fractions of RT - completed on 1/24  - PO Methadone 10mg BID (QTC on 1/23 - 416) (dose increased on 1/23)   - PO Oxy IR 15mg q6 ATC; patient may refuse doses   - IV Decadron 4mg BID   - PO Gabapentin 300mg TID  - PO Oxy IR 15mg q4 PRN moderate pain (hold for hypotension, oversedation, respiratory depression)  - PO Oxy IR 20mg q4PRN severe pain (hold for hypotension, oversedation, respiratory depression)  - Bowel regimen while on opiates  - Narcan PRN

## 2023-01-24 NOTE — PROGRESS NOTE ADULT - PROBLEM SELECTOR PLAN 1
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 Also can give dose IV Morphine prior to going for RT. Pain still not controlled, will f/u with palliative care for further recs  - bowel regimen while on opioids  - MRI 1/5 completed, no surgical intervention. Completed course of RT on 1/24.  - evaluated by ortho, no plan for surgical intervention if there is no fracture, can follow up outpatient if pain persists after palliative RT  - f/u XR hip to exclude new pathology involving hip

## 2023-01-24 NOTE — PROGRESS NOTE ADULT - PROBLEM SELECTOR PLAN 5
- home meds: Januvia 100mg, metformin 500mg BID  - 10/2022 A1C 7.4%, well controlled  - moderate ISS; start glargine 6u hs  - monitor FS qac and qHS  - started on steroids- monitor for steroid-induced hyperglycemia

## 2023-01-24 NOTE — CHART NOTE - NSCHARTNOTEFT_GEN_A_CORE
L Hip Xray with Displaced pathologic left femoral neck fracture.   Pt made non weight bearing to left lower extremity.   Ortho re-consulted, awaiting recommendations.   Med attg Dr. Jose made aware, Pall attg Dr. Michel made aware.

## 2023-01-25 ENCOUNTER — TRANSCRIPTION ENCOUNTER (OUTPATIENT)
Age: 58
End: 2023-01-25

## 2023-01-25 DIAGNOSIS — R42 DIZZINESS AND GIDDINESS: ICD-10-CM

## 2023-01-25 DIAGNOSIS — Z01.818 ENCOUNTER FOR OTHER PREPROCEDURAL EXAMINATION: ICD-10-CM

## 2023-01-25 LAB
ANION GAP SERPL CALC-SCNC: 10 MMOL/L — SIGNIFICANT CHANGE UP (ref 7–14)
BLD GP AB SCN SERPL QL: NEGATIVE — SIGNIFICANT CHANGE UP
BUN SERPL-MCNC: 12 MG/DL — SIGNIFICANT CHANGE UP (ref 7–23)
CALCIUM SERPL-MCNC: 9.2 MG/DL — SIGNIFICANT CHANGE UP (ref 8.4–10.5)
CHLORIDE SERPL-SCNC: 98 MMOL/L — SIGNIFICANT CHANGE UP (ref 98–107)
CO2 SERPL-SCNC: 24 MMOL/L — SIGNIFICANT CHANGE UP (ref 22–31)
CREAT SERPL-MCNC: 0.43 MG/DL — LOW (ref 0.5–1.3)
EGFR: 113 ML/MIN/1.73M2 — SIGNIFICANT CHANGE UP
GLUCOSE BLDC GLUCOMTR-MCNC: 106 MG/DL — HIGH (ref 70–99)
GLUCOSE BLDC GLUCOMTR-MCNC: 113 MG/DL — HIGH (ref 70–99)
GLUCOSE BLDC GLUCOMTR-MCNC: 126 MG/DL — HIGH (ref 70–99)
GLUCOSE BLDC GLUCOMTR-MCNC: 161 MG/DL — HIGH (ref 70–99)
GLUCOSE BLDC GLUCOMTR-MCNC: 187 MG/DL — HIGH (ref 70–99)
GLUCOSE SERPL-MCNC: 94 MG/DL — SIGNIFICANT CHANGE UP (ref 70–99)
HCG UR QL: NEGATIVE — SIGNIFICANT CHANGE UP
HCT VFR BLD CALC: 33 % — LOW (ref 34.5–45)
HGB BLD-MCNC: 10.6 G/DL — LOW (ref 11.5–15.5)
INR BLD: 1.33 RATIO — HIGH (ref 0.88–1.16)
MAGNESIUM SERPL-MCNC: 1.9 MG/DL — SIGNIFICANT CHANGE UP (ref 1.6–2.6)
MCHC RBC-ENTMCNC: 24.4 PG — LOW (ref 27–34)
MCHC RBC-ENTMCNC: 32.1 GM/DL — SIGNIFICANT CHANGE UP (ref 32–36)
MCV RBC AUTO: 75.9 FL — LOW (ref 80–100)
NRBC # BLD: 0 /100 WBCS — SIGNIFICANT CHANGE UP (ref 0–0)
NRBC # FLD: 0 K/UL — SIGNIFICANT CHANGE UP (ref 0–0)
PHOSPHATE SERPL-MCNC: 3.9 MG/DL — SIGNIFICANT CHANGE UP (ref 2.5–4.5)
PLATELET # BLD AUTO: 165 K/UL — SIGNIFICANT CHANGE UP (ref 150–400)
POTASSIUM SERPL-MCNC: 4.3 MMOL/L — SIGNIFICANT CHANGE UP (ref 3.5–5.3)
POTASSIUM SERPL-SCNC: 4.3 MMOL/L — SIGNIFICANT CHANGE UP (ref 3.5–5.3)
PROTHROM AB SERPL-ACNC: 15.5 SEC — HIGH (ref 10.5–13.4)
RBC # BLD: 4.35 M/UL — SIGNIFICANT CHANGE UP (ref 3.8–5.2)
RBC # FLD: 14.6 % — HIGH (ref 10.3–14.5)
RH IG SCN BLD-IMP: POSITIVE — SIGNIFICANT CHANGE UP
SARS-COV-2 RNA SPEC QL NAA+PROBE: SIGNIFICANT CHANGE UP
SODIUM SERPL-SCNC: 132 MMOL/L — LOW (ref 135–145)
WBC # BLD: 9.61 K/UL — SIGNIFICANT CHANGE UP (ref 3.8–10.5)
WBC # FLD AUTO: 9.61 K/UL — SIGNIFICANT CHANGE UP (ref 3.8–10.5)

## 2023-01-25 PROCEDURE — 71045 X-RAY EXAM CHEST 1 VIEW: CPT | Mod: 26

## 2023-01-25 PROCEDURE — 99233 SBSQ HOSP IP/OBS HIGH 50: CPT

## 2023-01-25 PROCEDURE — 73552 X-RAY EXAM OF FEMUR 2/>: CPT | Mod: 26,LT

## 2023-01-25 PROCEDURE — 72170 X-RAY EXAM OF PELVIS: CPT | Mod: 26

## 2023-01-25 PROCEDURE — 93010 ELECTROCARDIOGRAM REPORT: CPT

## 2023-01-25 RX ORDER — ENOXAPARIN SODIUM 100 MG/ML
40 INJECTION SUBCUTANEOUS EVERY 24 HOURS
Refills: 0 | Status: COMPLETED | OUTPATIENT
Start: 2023-01-25 | End: 2023-01-25

## 2023-01-25 RX ORDER — POVIDONE-IODINE 5 %
1 AEROSOL (ML) TOPICAL ONCE
Refills: 0 | Status: DISCONTINUED | OUTPATIENT
Start: 2023-01-25 | End: 2023-02-03

## 2023-01-25 RX ORDER — INSULIN GLARGINE 100 [IU]/ML
4 INJECTION, SOLUTION SUBCUTANEOUS AT BEDTIME
Refills: 0 | Status: DISCONTINUED | OUTPATIENT
Start: 2023-01-25 | End: 2023-02-03

## 2023-01-25 RX ORDER — OXYCODONE HYDROCHLORIDE 5 MG/1
15 TABLET ORAL
Refills: 0 | Status: DISCONTINUED | OUTPATIENT
Start: 2023-01-25 | End: 2023-01-26

## 2023-01-25 RX ORDER — OXYCODONE HYDROCHLORIDE 5 MG/1
10 TABLET ORAL EVERY 6 HOURS
Refills: 0 | Status: DISCONTINUED | OUTPATIENT
Start: 2023-01-25 | End: 2023-01-26

## 2023-01-25 RX ORDER — CHLORHEXIDINE GLUCONATE 213 G/1000ML
1 SOLUTION TOPICAL EVERY 12 HOURS
Refills: 0 | Status: COMPLETED | OUTPATIENT
Start: 2023-01-25 | End: 2023-01-26

## 2023-01-25 RX ORDER — OXYCODONE HYDROCHLORIDE 5 MG/1
20 TABLET ORAL
Refills: 0 | Status: DISCONTINUED | OUTPATIENT
Start: 2023-01-25 | End: 2023-01-26

## 2023-01-25 RX ADMIN — OXYCODONE HYDROCHLORIDE 10 MILLIGRAM(S): 5 TABLET ORAL at 17:15

## 2023-01-25 RX ADMIN — Medication 2: at 18:14

## 2023-01-25 RX ADMIN — METHADONE HYDROCHLORIDE 10 MILLIGRAM(S): 40 TABLET ORAL at 18:10

## 2023-01-25 RX ADMIN — ENOXAPARIN SODIUM 40 MILLIGRAM(S): 100 INJECTION SUBCUTANEOUS at 13:16

## 2023-01-25 RX ADMIN — OXYCODONE HYDROCHLORIDE 10 MILLIGRAM(S): 5 TABLET ORAL at 23:03

## 2023-01-25 RX ADMIN — METHADONE HYDROCHLORIDE 10 MILLIGRAM(S): 40 TABLET ORAL at 05:34

## 2023-01-25 RX ADMIN — CHLORHEXIDINE GLUCONATE 1 APPLICATION(S): 213 SOLUTION TOPICAL at 13:15

## 2023-01-25 RX ADMIN — ONDANSETRON 4 MILLIGRAM(S): 8 TABLET, FILM COATED ORAL at 05:34

## 2023-01-25 RX ADMIN — Medication 1 UNIT(S): at 18:15

## 2023-01-25 RX ADMIN — Medication 3 MILLIGRAM(S): at 23:04

## 2023-01-25 RX ADMIN — INSULIN GLARGINE 4 UNIT(S): 100 INJECTION, SOLUTION SUBCUTANEOUS at 23:02

## 2023-01-25 RX ADMIN — Medication 1 TABLET(S): at 13:11

## 2023-01-25 RX ADMIN — MONTELUKAST 10 MILLIGRAM(S): 4 TABLET, CHEWABLE ORAL at 23:04

## 2023-01-25 RX ADMIN — OXYCODONE HYDROCHLORIDE 15 MILLIGRAM(S): 5 TABLET ORAL at 06:16

## 2023-01-25 RX ADMIN — Medication 1 UNIT(S): at 09:41

## 2023-01-25 RX ADMIN — GABAPENTIN 300 MILLIGRAM(S): 400 CAPSULE ORAL at 13:12

## 2023-01-25 RX ADMIN — GABAPENTIN 300 MILLIGRAM(S): 400 CAPSULE ORAL at 23:03

## 2023-01-25 RX ADMIN — OXYCODONE HYDROCHLORIDE 20 MILLIGRAM(S): 5 TABLET ORAL at 04:24

## 2023-01-25 RX ADMIN — Medication 5 MILLIGRAM(S): at 23:04

## 2023-01-25 RX ADMIN — ONDANSETRON 4 MILLIGRAM(S): 8 TABLET, FILM COATED ORAL at 13:12

## 2023-01-25 RX ADMIN — GABAPENTIN 300 MILLIGRAM(S): 400 CAPSULE ORAL at 05:34

## 2023-01-25 RX ADMIN — CHLORHEXIDINE GLUCONATE 1 APPLICATION(S): 213 SOLUTION TOPICAL at 23:08

## 2023-01-25 RX ADMIN — ONDANSETRON 4 MILLIGRAM(S): 8 TABLET, FILM COATED ORAL at 23:04

## 2023-01-25 RX ADMIN — OXYCODONE HYDROCHLORIDE 20 MILLIGRAM(S): 5 TABLET ORAL at 03:24

## 2023-01-25 RX ADMIN — POLYETHYLENE GLYCOL 3350 17 GRAM(S): 17 POWDER, FOR SOLUTION ORAL at 05:34

## 2023-01-25 RX ADMIN — OXYCODONE HYDROCHLORIDE 15 MILLIGRAM(S): 5 TABLET ORAL at 12:17

## 2023-01-25 RX ADMIN — OXYCODONE HYDROCHLORIDE 10 MILLIGRAM(S): 5 TABLET ORAL at 16:14

## 2023-01-25 RX ADMIN — OXYCODONE HYDROCHLORIDE 15 MILLIGRAM(S): 5 TABLET ORAL at 05:33

## 2023-01-25 RX ADMIN — PANTOPRAZOLE SODIUM 40 MILLIGRAM(S): 20 TABLET, DELAYED RELEASE ORAL at 05:34

## 2023-01-25 RX ADMIN — OXYCODONE HYDROCHLORIDE 15 MILLIGRAM(S): 5 TABLET ORAL at 11:33

## 2023-01-25 RX ADMIN — Medication 1 UNIT(S): at 13:10

## 2023-01-25 RX ADMIN — SENNA PLUS 2 TABLET(S): 8.6 TABLET ORAL at 23:05

## 2023-01-25 RX ADMIN — Medication 400 UNIT(S): at 13:11

## 2023-01-25 NOTE — PROGRESS NOTE ADULT - PROBLEM SELECTOR PLAN 3
- s/p 5 fractions of RT - completed on 1/24  - Discussed with patient, , and daughter Olegario about considering PCA pump for demand doses as patient with frequent pain. Attempted to complete teach-back method with patient on how to use PCA pump. Patient was unable to demonstrate teach back and shared that she feels like her head is confused at times and does not think she will remember how to use the pump if started.   Discussed that will hold off on pump for above reasons.   - PO Methadone 10mg BID (QTC on 1/23 - 416) (dose increased on 1/23)   - Decrease to PO Oxy IR 10mg q6 ATC; patient may refuse doses   - IV Decadron 4mg BID   - PO Gabapentin 300mg TID  - PO Oxy IR 15mg q3 PRN moderate pain (hold for hypotension, oversedation, respiratory depression)  - PO Oxy IR 20mg q3 PRN severe pain (hold for hypotension, oversedation, respiratory depression)  - Bowel regimen while on opiates  - Narcan PRN

## 2023-01-25 NOTE — PROGRESS NOTE ADULT - SUBJECTIVE AND OBJECTIVE BOX
ORTHO PROGRESS NOTE     Pt seen and examined at bedside, denies SOB, CP, Dizziness. N/V/D /HA.  No significant overnight events. Pain well controlled.    Vital Signs Last 24 Hrs  T(C): 36.4 (25 Jan 2023 06:13), Max: 36.9 (24 Jan 2023 21:30)  T(F): 97.6 (25 Jan 2023 06:13), Max: 98.4 (24 Jan 2023 21:30)  HR: 63 (25 Jan 2023 06:13) (63 - 76)  BP: 103/67 (25 Jan 2023 06:13) (103/67 - 134/74)  BP(mean): --  RR: 19 (25 Jan 2023 06:13) (17 - 19)  SpO2: 97% (25 Jan 2023 06:13) (96% - 98%)    Parameters below as of 25 Jan 2023 06:13  Patient On (Oxygen Delivery Method): room air        Gen: NAD, alert and oriented  Resp: Unlabored breathing  LLE: Skin intact       SILT DP/SP/ Denise/Saph/tib       5/5 EHL 5/5 FHL 5/5 TA 5/5 Gastroc 5/5 IP        DP+,        soft compartments, no calf ttp,       + log roll      Labs:              A/P  Pt is a 58y Female w/ L pathologic femoral neck fx. Plan for OR tomorrow w/ Dr Villa    - Pain control/ Analgesia  - NPO after MN and IVF  - Hold DVT PPX  - NWB LLE  - Appreciate medical clearance  - FU labs  - FU COVID/HCG  - FU CXR

## 2023-01-25 NOTE — PROGRESS NOTE ADULT - PROBLEM SELECTOR PLAN 4
- home meds: Januvia 100mg, metformin 500mg BID  - 10/2022 A1C 7.4%, well controlled  - moderate ISS; start glargine 6u hs  - monitor FS qac and qHS  - started on steroids- monitor for steroid-induced hyperglycemia - home meds: Januvia 100mg, metformin 500mg BID  - 10/2022 A1C 7.4%, well controlled  - moderate ISS; started glargine 6u hs; decreasing to 4u hs as pt will be NPO after MN  - monitor FS qac and qHS  - started on steroids- monitor for steroid-induced hyperglycemia

## 2023-01-25 NOTE — PROGRESS NOTE ADULT - PROBLEM SELECTOR PLAN 8
DVT ppx: Lovenox  PT recommending F[facility]IBA, however pt wants to go home  code: full code  Updated pt's daughter at bedside on 1/24 DVT ppx: Lovenox  PT recommending F[facility]IBA, however pt wants to go home  code: full code  Updated pt's daughter via phone on 1/25; all questions answered

## 2023-01-25 NOTE — PROGRESS NOTE ADULT - SUBJECTIVE AND OBJECTIVE BOX
INTERVAL HPI/OVERNIGHT EVENTS:  In past 24 hours, received 2 prn doses of PO Oxy 20mg (in addition to PO Oxycodone 15mg q6 ATC)     SUBJECTIVE AND OBJECTIVE:  Portuguese : [id number]  Patient seen with  at bedside. Patient complaining of intermittent pain still. She states that she has intermittent dizziness that has been occurring prior to admission and has not improved .   Denies nausea.     Allergies    No Known Allergies    Intolerances      MEDICATIONS  (STANDING):  bisacodyl 5 milliGRAM(s) Oral at bedtime  chlorhexidine 2% Cloths 1 Application(s) Topical daily  chlorhexidine 4% Liquid 1 Application(s) Topical every 12 hours  cholecalciferol 400 Unit(s) Oral daily  dextrose 5%. 1000 milliLiter(s) (100 mL/Hr) IV Continuous <Continuous>  dextrose 5%. 1000 milliLiter(s) (50 mL/Hr) IV Continuous <Continuous>  dextrose 50% Injectable 25 Gram(s) IV Push once  dextrose 50% Injectable 12.5 Gram(s) IV Push once  dextrose 50% Injectable 25 Gram(s) IV Push once  gabapentin 300 milliGRAM(s) Oral three times a day  glucagon  Injectable 1 milliGRAM(s) IntraMuscular once  influenza   Vaccine 0.5 milliLiter(s) IntraMuscular once  insulin glargine Injectable (LANTUS) 4 Unit(s) SubCutaneous at bedtime  insulin lispro (ADMELOG) corrective regimen sliding scale   SubCutaneous three times a day before meals  insulin lispro (ADMELOG) corrective regimen sliding scale   SubCutaneous at bedtime  insulin lispro Injectable (ADMELOG) 1 Unit(s) SubCutaneous three times a day before meals  lidocaine   4% Patch 1 Patch Transdermal daily  melatonin 3 milliGRAM(s) Oral at bedtime  methadone    Tablet 10 milliGRAM(s) Oral every 12 hours  montelukast 10 milliGRAM(s) Oral at bedtime  multivitamin 1 Tablet(s) Oral daily  ondansetron   Disintegrating Tablet 4 milliGRAM(s) Oral every 8 hours  oxyCODONE    IR 10 milliGRAM(s) Oral every 6 hours  pantoprazole    Tablet 40 milliGRAM(s) Oral before breakfast  polyethylene glycol 3350 17 Gram(s) Oral two times a day  povidone iodine 5% Nasal Swab 1 Application(s) Both Nostrils once  senna 2 Tablet(s) Oral at bedtime    MEDICATIONS  (PRN):  acetaminophen     Tablet .. 650 milliGRAM(s) Oral every 6 hours PRN Temp greater or equal to 38C (100.4F), Mild Pain (1 - 3)  dextrose Oral Gel 15 Gram(s) Oral once PRN Blood Glucose LESS THAN 70 milliGRAM(s)/deciliter  meclizine 12.5 milliGRAM(s) Oral every 6 hours PRN Dizziness  naloxone 1 mG/mL Injection for Intranasal Use 0.4 milliGRAM(s) IntraNasal every 3 minutes PRN overdose  oxyCODONE    IR 15 milliGRAM(s) Oral every 3 hours PRN Moderate Pain (4 - 6)  oxyCODONE    IR 20 milliGRAM(s) Oral every 3 hours PRN Severe Pain (7 - 10)  prochlorperazine   Injectable 5 milliGRAM(s) IV Push every 8 hours PRN refractory nausea        ITEMS UNCHECKED ARE NOT PRESENT    PRESENT SYMPTOMS: [ ]Unable to self-report due to altered mental status- see [ ] CPOT [ ] PAINADS [ ] RDOS  Source if other than patient:  [ ]Family   [ ]Team     Pain: [ x]yes [ ]no  QOL impact - severe  Location - left thigh                     Aggravating factors - movement   Quality - tingling   Radiation - left lower extremity and lower back  Timing- constant with intermittent worsening episodes   Severity (0-10 scale): 10  Minimal acceptable level / Pain Goal (0-10 scale):  7-8    Dyspnea:                           [ ]Mild [ ]Moderate [ ]Severe  Anxiety:                             [ ]Mild [ ]Moderate [ ]Severe  Agitation:                          [ ]Mild [ ]Moderate [ ]Severe  Fatigue:                             [ ]Mild [ ]Moderate [ ]Severe  Nausea:                             [ ]Mild [ ]Moderate [ ]Severe  Loss of appetite:              [ ]Mild [ ]Moderate [ ]Severe  Constipation:                   [ ]Mild [ ]Moderate [ ]Severe  Diarrhea:                          [ ]Mild [ ]Moderate [ ]Severe    CPOT:    https://www.[facility].org/getattachment/[email or web address]/Critical-Care-Pain-Observation-Tool-(CPOT)    PCSSQ[Palliative Care Spiritual Screening Question]   Severity (0-10):  Score of 4 or > indicate consideration of Chaplaincy referral.  Chaplaincy Referral: [ ] yes [ ] refused [ ] following [x ] deferred    Caregiver [place]? : [ ] yes [ ] no [ ] Declined [x ] Deferred              Social work referral [ ] Patient & Family Centered Care Referral [ ]     Anticipatory Grief present?:  [x ] yes [ ] no  [ ] Deferred                  Social work referral [x ] Chaplaincy Referral[ ]    Other Symptoms:  [ x]All other review of systems negative     PHYSICAL EXAM:  Vital Signs Last 24 Hrs  T(C): 36.4 (25 Jan 2023 06:13), Max: 36.9 (24 Jan 2023 21:30)  T(F): 97.6 (25 Jan 2023 06:13), Max: 98.4 (24 Jan 2023 21:30)  HR: 63 (25 Jan 2023 06:13) (63 - 69)  BP: 103/67 (25 Jan 2023 06:13) (103/67 - 132/84)  BP(mean): --  RR: 19 (25 Jan 2023 06:13) (18 - 19)  SpO2: 97% (25 Jan 2023 06:13) (97% - 98%)    Parameters below as of 25 Jan 2023 06:13  Patient On (Oxygen Delivery Method): room air      GENERAL:  [x ]Alert  [x ]Oriented x 3  [ ]Lethargic  [ ]Cachexia  [ ]Unarousable  [x ]Verbal  [ ]Non-Verbal    Behavioral:   [ ] Anxiety  [ ] Delirium [ ] Agitation [ x] Calm  [ ] Other  HEENT:  [ x]Normal  [ ] Temporal Wasting  [ ]Dry mouth   [ ]ET Tube/Trach  [ ]Oral lesions  [ ] Mucositis  PULMONARY:   [ x]Clear [ ]Tachypnea  [ ]Audible excessive secretions   [ ]Rhonchi        [ ]Right [ ]Left [ ]Bilateral  [ ]Crackles        [ ]Right [ ]Left [ ]Bilateral  [ ]Wheezing     [ ]Right [ ]Left [ ]Bilateral  [ ]Diminished breath sounds [ ]right [ ]left [ ]bilateral  CARDIOVASCULAR:    [ x]Regular [ ]Irregular [ ]Tachy  [ ]Dmitry [ ]Murmur [ ]Other  GASTROINTESTINAL:  [ x]Soft  [ ]Distended   [ ]+BS  [x ]Non tender [ ]Tender  [ ]PEG [ ]OGT/ NGT  Last BM: 1/24  GENITOURINARY:   [x ]Normal [ ] Incontinent   [ ]Oliguria/Anuria   [ ]Swanson  MUSCULOSKELETAL:   [ ]Normal   [x ]Weakness; left lower extremity movement limited due to pain  [ ]Bed/Wheelchair bound [ ]Edema  [  ] amputation  [  ] contraction  NEUROLOGIC:   [ x]No focal deficits  [ ]Cognitive impairment  [ ]Dysphagia [ ]Dysarthria [ ]Paresis [ ]Other   SKIN: See Nursing Skin Assessment for further details  [x ]Normal    [ ]Rash  [ ]Pressure ulcer(s)       Present on admission [ ]y [ ]n   [  ]  Wound    [  ] hyperpigmentation      CRITICAL CARE:  [ ]Shock Present  [ ]Septic [ ]Cardiogenic [ ]Neurologic [ ]Hypovolemic  [ ]Vasopressors [ ]Inotropes  [ ]Respiratory failure present [ ]Mechanical Ventilation [ ]Non-invasive ventilatory support [ ]High-Flow   [ ]Acute  [ ]Chronic [ ]Hypoxic  [ ]Hypercarbic [ ]Other  [ ]Other organ failure     LABS:                                             10.6   9.61  )-----------( 165      ( 25 Jan 2023 06:30 )             33.0       01-25    132<L>  |  98  |  12  ----------------------------<  94  4.3   |  24  |  0.43<L>    Ca    9.2      25 Jan 2023 06:30  Phos  3.9     01-25  Mg     1.90     01-25        PT/INR - ( 25 Jan 2023 06:30 )   PT: 15.5 sec;   INR: 1.33 ratio             RADIOLOGY & ADDITIONAL STUDIES: reviewed   XRAY Left Hip 1/24/2023  Displaced pathologic left femoral neck fracture. No dislocations or   additional fractures.    No additional radiographically appreciated suspicious focal osseous   lesions.    External urine catch device overlies vulvar region.    Protein Calorie Malnutrition Present: [ ]mild [ ]moderate [ ]severe [ ]underweight [ ]morbid obesity  https://www.andeal.org/vault/2440/web/files/ONC/Table_Clinical%20Characteristics%20to%20Document%20Malnutrition-White%20JV%20et%20al%202012.pdf    Height (cm): 149 (12-29-22 @ 22:41), 149 (12-27-22 @ 16:21), 147.3 (10-22-22 @ 08:57)  Weight (kg): 43.5 (12-27-22 @ 16:21), 47 (10-22-22 @ 08:57), 45.9 (07-20-22 @ 11:51)  BMI (kg/m2): 19.6 (12-29-22 @ 22:41), 19.6 (12-27-22 @ 16:21), 21.7 (10-22-22 @ 08:57)    [ ]PPSV2 < or = 30%  [ ]significant weight loss [ ]poor nutritional intake [ ]anasarca   [ ]Artificial Nutrition    REFERRALS:   [ ]Chaplaincy  [ ]Hospice  [ ]Child Life  [ ]Social Work  [ x]Case management [ ]Holistic Therapy

## 2023-01-25 NOTE — PROGRESS NOTE ADULT - SUBJECTIVE AND OBJECTIVE BOX
Patient is a 58y old  Female who presents with a chief complaint of Difficulty ambulating (25 Jan 2023 06:39)    SUBJECTIVE / OVERNIGHT EVENTS:  Patient's daughter provided translation. Declined phone .    No events overnight. This AM, patient without n/v/d/cp/sob.      MEDICATIONS  (STANDING):  bisacodyl 5 milliGRAM(s) Oral at bedtime  chlorhexidine 2% Cloths 1 Application(s) Topical daily  cholecalciferol 400 Unit(s) Oral daily  dextrose 5%. 1000 milliLiter(s) (50 mL/Hr) IV Continuous <Continuous>  dextrose 5%. 1000 milliLiter(s) (100 mL/Hr) IV Continuous <Continuous>  dextrose 50% Injectable 25 Gram(s) IV Push once  dextrose 50% Injectable 25 Gram(s) IV Push once  dextrose 50% Injectable 12.5 Gram(s) IV Push once  enoxaparin Injectable 40 milliGRAM(s) SubCutaneous every 24 hours  gabapentin 300 milliGRAM(s) Oral three times a day  glucagon  Injectable 1 milliGRAM(s) IntraMuscular once  influenza   Vaccine 0.5 milliLiter(s) IntraMuscular once  insulin glargine Injectable (LANTUS) 6 Unit(s) SubCutaneous at bedtime  insulin lispro (ADMELOG) corrective regimen sliding scale   SubCutaneous at bedtime  insulin lispro (ADMELOG) corrective regimen sliding scale   SubCutaneous three times a day before meals  insulin lispro Injectable (ADMELOG) 1 Unit(s) SubCutaneous three times a day before meals  lidocaine   4% Patch 1 Patch Transdermal daily  melatonin 3 milliGRAM(s) Oral at bedtime  methadone    Tablet 10 milliGRAM(s) Oral every 12 hours  montelukast 10 milliGRAM(s) Oral at bedtime  multivitamin 1 Tablet(s) Oral daily  ondansetron   Disintegrating Tablet 4 milliGRAM(s) Oral every 8 hours  oxyCODONE    IR 15 milliGRAM(s) Oral every 6 hours  pantoprazole    Tablet 40 milliGRAM(s) Oral before breakfast  polyethylene glycol 3350 17 Gram(s) Oral two times a day  senna 2 Tablet(s) Oral at bedtime    MEDICATIONS  (PRN):  acetaminophen     Tablet .. 650 milliGRAM(s) Oral every 6 hours PRN Temp greater or equal to 38C (100.4F), Mild Pain (1 - 3)  dextrose Oral Gel 15 Gram(s) Oral once PRN Blood Glucose LESS THAN 70 milliGRAM(s)/deciliter  meclizine 12.5 milliGRAM(s) Oral every 6 hours PRN Dizziness  naloxone 1 mG/mL Injection for Intranasal Use 0.4 milliGRAM(s) IntraNasal every 3 minutes PRN overdose  oxyCODONE    IR 20 milliGRAM(s) Oral every 4 hours PRN Severe Pain (7 - 10)  oxyCODONE    IR 15 milliGRAM(s) Oral every 4 hours PRN Moderate Pain (4 - 6)  prochlorperazine   Injectable 5 milliGRAM(s) IV Push every 8 hours PRN refractory nausea      PHYSICAL EXAM:  T(C): 36.4 (01-25-23 @ 06:13), Max: 36.9 (01-24-23 @ 21:30)  HR: 63 (01-25-23 @ 06:13) (63 - 76)  BP: 103/67 (01-25-23 @ 06:13) (103/67 - 134/74)  RR: 19 (01-25-23 @ 06:13) (17 - 19)  SpO2: 97% (01-25-23 @ 06:13) (96% - 98%)  I&O's Summary    GENERAL: NAD, well-developed, lying in bed  HEAD:  Atraumatic, Normocephalic, MMM  CHEST/LUNG: No use of accessory muscles, CTAB, breathing non-labored  COR: RR, no mrcg  ABD: Soft, ND/NT, +BS  PSYCH: AAOx3  NEUROLOGY: CN II-XII grossly intact, moving all extremities  SKIN: No rashes or lesions  EXT: wwp, no cce    LABS:  CAPILLARY BLOOD GLUCOSE      POCT Blood Glucose.: 161 mg/dL (25 Jan 2023 11:24)  POCT Blood Glucose.: 113 mg/dL (25 Jan 2023 09:11)  POCT Blood Glucose.: 125 mg/dL (24 Jan 2023 22:24)  POCT Blood Glucose.: 139 mg/dL (24 Jan 2023 17:54)                          10.6   9.61  )-----------( 165      ( 25 Jan 2023 06:30 )             33.0     01-25    132<L>  |  98  |  12  ----------------------------<  94  4.3   |  24  |  0.43<L>    Ca    9.2      25 Jan 2023 06:30  Phos  3.9     01-25  Mg     1.90     01-25      PT/INR - ( 25 Jan 2023 06:30 )   PT: 15.5 sec;   INR: 1.33 ratio                     RADIOLOGY & ADDITIONAL TESTS:    Telemetry Personally Reviewed -     Imaging Personally Reviewed -     Imaging Reviewed -     Consultant(s) Notes Reviewed -   orthopedics    Care Discussed with Consultants/Other Providers -

## 2023-01-25 NOTE — PROGRESS NOTE ADULT - PROBLEM SELECTOR PLAN 5
- home med: protonix 40mg daily, sucralfate 1g BID  - c/w protonix  - avoid NSAIDs given recent c/f gastritis    Nausea, improving  - ATC Zofran 4mg q5h, prn compazine IV

## 2023-01-25 NOTE — PROGRESS NOTE ADULT - PROBLEM SELECTOR PLAN 4
- Patient reports having dizziness daily since prior to admission  - Will decrease ATC oxycodone dose as above to assess if this helps with dizziness and as methadone was increased on 1/23  - Discussed with primary team to consider further workup as dizziness has occurred prior to admission which is prior to increases in opioid doses   - workup/ management as per primary team

## 2023-01-25 NOTE — PROGRESS NOTE ADULT - PROBLEM SELECTOR PLAN 9
- F: none  - E: replete K<4, Mg<2  - N: regular diet  - D: lovenox 40mg q24h  - G: protonix 40mg daily    code: full code     dispo: pending RT x5 sessions
- F: none  - E: replete K<4, Mg<2  - N: regular diet  - D: lovenox 40mg q24h  - G: protonix 40mg daily    code: full code     dispo: pending RT x5 sessions
DVT ppx: Lovenox  PT recommending FARIBA, however pt wants to go home  code: full code, ongoing GOC discussion  Updated pt's daughter at bedside on 1/23
- F: none  - E: replete K<4, Mg<2  - N: regular diet  - D: lovenox 40mg q24h  - G: protonix 40mg daily    code: full code     dispo: pending RT x5 sessions
DVT ppx: Lovenox  PT recommending FARIBA, however pt wants to go home  code: full code, ongoing GOC discussion  Updated pt's daughter on 1/19  Will have CM follow up for safe d/c planning
DVT ppx: Lovenox  PT eval  code: full code, ongoing GOC discussion  Updated pt's daughter on 1/18
- F: none  - E: replete K<4, Mg<2  - N: regular diet  - D: lovenox 40mg q24h  - G: protonix 40mg daily    code: full -- discussed with pt and daughter bedside on 12/30 understanding of cancer diagnosis and metastatic disease causing pain and current admission; would like full escalation of care and advanced measures as indicated; daughter is HCP and filled out paperwork last admission; would like to be notified of any and all medical updates    dispo: pending medical optimization and PT eval
- F: none  - E: replete K<4, Mg<2  - N: regular diet  - D: lovenox 40mg q24h  - G: protonix 40mg daily    code: full code     dispo: pending RT x5 sessions
- F: none  - E: replete K<4, Mg<2  - N: regular diet  - D: lovenox 40mg q24h  - G: protonix 40mg daily    code: full -- discussed with pt and daughter bedside on 12/30 understanding of cancer diagnosis and metastatic disease causing pain and current admission; would like full escalation of care and advanced measures as indicated; daughter is HCP and filled out paperwork last admission; would like to be notified of any and all medical updates    dispo: pending medical optimization and PT eval
- F: none  - E: replete K<4, Mg<2  - N: regular diet  - D: lovenox 40mg q24h  - G: protonix 40mg daily    code: full -- discussed with pt and daughter bedside on 12/30 understanding of cancer diagnosis and metastatic disease causing pain and current admission; would like full escalation of care and advanced measures as indicated; daughter is HCP and filled out paperwork last admission; would like to be notified of any and all medical updates    dispo: pending RT
- F: none  - E: replete K<4, Mg<2  - N: regular diet  - D: lovenox 40mg q24h  - G: protonix 40mg daily    code: full -- discussed with pt and daughter bedside on 12/30 understanding of cancer diagnosis and metastatic disease causing pain and current admission; would like full escalation of care and advanced measures as indicated; daughter is HCP and filled out paperwork last admission; would like to be notified of any and all medical updates    dispo: pending RT
- F: none  - E: replete K<4, Mg<2  - N: regular diet  - D: lovenox 40mg q24h  - G: protonix 40mg daily    code: full -- discussed with pt and daughter bedside on 12/30 understanding of cancer diagnosis and metastatic disease causing pain and current admission; would like full escalation of care and advanced measures as indicated; daughter is HCP and filled out paperwork last admission; would like to be notified of any and all medical updates    dispo: pending medical optimization and PT eval
DVT ppx: Lovenox  PT recommending FARIBA, however pt wants to go home  code: full code, ongoing GOC discussion  Updated pt's daughter on 1/19  Will have CM follow up for safe d/c planning
DVT ppx: Lovenox  PT eval  code: full code, ongoing GOC discussion  Updated pt's daughter on 1/17
- F: none  - E: replete K<4, Mg<2  - N: regular diet  - D: lovenox 40mg q24h  - G: protonix 40mg daily    code: full -- discussed with pt and daughter bedside on 12/30 understanding of cancer diagnosis and metastatic disease causing pain and current admission; would like full escalation of care and advanced measures as indicated; daughter is HCP and filled out paperwork last admission; would like to be notified of any and all medical updates    dispo: pending medical optimization and PT eval
DVT ppx: Lovenox  PT recommending FARIBA, however pt wants to go home  code: full code  Updated pt's daughter at bedside on 1/24
- F: none  - E: replete K<4, Mg<2  - N: regular diet  - D: lovenox 40mg q24h  - G: protonix 40mg daily    code: full code     dispo: pending RT x5 sessions
- F: none  - E: replete K<4, Mg<2  - N: regular diet  - D: lovenox 40mg q24h  - G: protonix 40mg daily    code: full code     dispo: pending RT x5 sessions
DVT ppx: Lovenox  PT recommending FARIBA, however pt wants to go home  code: full code, ongoing GOC discussion  Updated pt's daughter on 1/19  Will have CM follow up for safe d/c planning
- F: none  - E: replete K<4, Mg<2  - N: regular diet  - D: lovenox 40mg q24h  - G: protonix 40mg daily    code: full code     dispo: pending RT x5 sessions
- F: none  - E: replete K<4, Mg<2  - N: regular diet  - D: lovenox 40mg q24h  - G: protonix 40mg daily    code: full -- discussed with pt and daughter bedside on 12/30 understanding of cancer diagnosis and metastatic disease causing pain and current admission; would like full escalation of care and advanced measures as indicated; daughter is HCP and filled out paperwork last admission; would like to be notified of any and all medical updates    dispo: pending medical optimization and PT eval
- F: none  - E: replete K<4, Mg<2  - N: regular diet  - D: lovenox 40mg q24h  - G: protonix 40mg daily    code: full code     dispo: pending RT x5 sessions
DVT ppx: Lovenox  PT recommending FARIBA, however pt wants to go home  code: full code, ongoing GOC discussion  Updated pt's daughter on 1/19  Will have CM follow up for safe d/c planning
Patient reports undergoing surgery in 2019 with no known perioperative complications.  States she ambulates with a walker; is unable to quantify exercise tolerance due to mobility limitations.  Reports no CP/sob w/activities.  Patient is partially dependent on ADLs.  Soto score of 1%. Patient is at increased risk of major adverse cardiovascular events related to surgery, but this risk is not modifiable.  No further testing or intervention is required prior to OR.

## 2023-01-25 NOTE — PROGRESS NOTE ADULT - PROBLEM SELECTOR PROBLEM 9
Nutrition, metabolism, and development symptoms
Preoperative examination
Nutrition, metabolism, and development symptoms

## 2023-01-25 NOTE — PROGRESS NOTE ADULT - PROBLEM SELECTOR PLAN 1
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 Also can give dose IV Morphine prior to going for RT. Pain still not controlled, will f/u with palliative care for further recs  - bowel regimen while on opioids  - MRI 1/5 completed, no surgical intervention. Completed course of RT on 1/24.  - evaluated by ortho, no plan for surgical intervention if there is no fracture, can follow up outpatient if pain persists after palliative RT  - XR hip with L pathological femoral neck fracture; orthopedics following, planned for OR tomorrow; see below for pre-operative clearance

## 2023-01-25 NOTE — PROGRESS NOTE ADULT - PROBLEM SELECTOR PLAN 5
Patient discussed during oncology interdisciplinary rounds   Symptom management recommendations discussed with primary team   Thank you for allowing us to participate in your patient's care. Please page [phone] for any questions/concerns.

## 2023-01-26 ENCOUNTER — RESULT REVIEW (OUTPATIENT)
Age: 58
End: 2023-01-26

## 2023-01-26 LAB
ANION GAP SERPL CALC-SCNC: 10 MMOL/L — SIGNIFICANT CHANGE UP (ref 7–14)
ANION GAP SERPL CALC-SCNC: 12 MMOL/L — SIGNIFICANT CHANGE UP (ref 7–14)
APTT BLD: 28.5 SEC — SIGNIFICANT CHANGE UP (ref 27–36.3)
BASOPHILS # BLD AUTO: 0.03 K/UL — SIGNIFICANT CHANGE UP (ref 0–0.2)
BASOPHILS NFR BLD AUTO: 0.2 % — SIGNIFICANT CHANGE UP (ref 0–2)
BLD GP AB SCN SERPL QL: NEGATIVE — SIGNIFICANT CHANGE UP
BUN SERPL-MCNC: 14 MG/DL — SIGNIFICANT CHANGE UP (ref 7–23)
BUN SERPL-MCNC: 14 MG/DL — SIGNIFICANT CHANGE UP (ref 7–23)
CALCIUM SERPL-MCNC: 8.8 MG/DL — SIGNIFICANT CHANGE UP (ref 8.4–10.5)
CALCIUM SERPL-MCNC: 8.8 MG/DL — SIGNIFICANT CHANGE UP (ref 8.4–10.5)
CHLORIDE SERPL-SCNC: 96 MMOL/L — LOW (ref 98–107)
CHLORIDE SERPL-SCNC: 96 MMOL/L — LOW (ref 98–107)
CO2 SERPL-SCNC: 23 MMOL/L — SIGNIFICANT CHANGE UP (ref 22–31)
CO2 SERPL-SCNC: 24 MMOL/L — SIGNIFICANT CHANGE UP (ref 22–31)
CREAT SERPL-MCNC: 0.5 MG/DL — SIGNIFICANT CHANGE UP (ref 0.5–1.3)
CREAT SERPL-MCNC: 0.55 MG/DL — SIGNIFICANT CHANGE UP (ref 0.5–1.3)
EGFR: 107 ML/MIN/1.73M2 — SIGNIFICANT CHANGE UP
EGFR: 109 ML/MIN/1.73M2 — SIGNIFICANT CHANGE UP
EOSINOPHIL # BLD AUTO: 0.2 K/UL — SIGNIFICANT CHANGE UP (ref 0–0.5)
EOSINOPHIL NFR BLD AUTO: 1.3 % — SIGNIFICANT CHANGE UP (ref 0–6)
GLUCOSE BLDC GLUCOMTR-MCNC: 133 MG/DL — HIGH (ref 70–99)
GLUCOSE BLDC GLUCOMTR-MCNC: 157 MG/DL — HIGH (ref 70–99)
GLUCOSE BLDC GLUCOMTR-MCNC: 159 MG/DL — HIGH (ref 70–99)
GLUCOSE BLDC GLUCOMTR-MCNC: 164 MG/DL — HIGH (ref 70–99)
GLUCOSE BLDC GLUCOMTR-MCNC: 169 MG/DL — HIGH (ref 70–99)
GLUCOSE BLDC GLUCOMTR-MCNC: 225 MG/DL — HIGH (ref 70–99)
GLUCOSE BLDC GLUCOMTR-MCNC: 229 MG/DL — HIGH (ref 70–99)
GLUCOSE SERPL-MCNC: 166 MG/DL — HIGH (ref 70–99)
GLUCOSE SERPL-MCNC: 213 MG/DL — HIGH (ref 70–99)
HCG SERPL-ACNC: <5 MIU/ML — SIGNIFICANT CHANGE UP
HCT VFR BLD CALC: 33 % — LOW (ref 34.5–45)
HCT VFR BLD CALC: 34.2 % — LOW (ref 34.5–45)
HGB BLD-MCNC: 10.8 G/DL — LOW (ref 11.5–15.5)
HGB BLD-MCNC: 11 G/DL — LOW (ref 11.5–15.5)
IANC: 12.75 K/UL — HIGH (ref 1.8–7.4)
IMM GRANULOCYTES NFR BLD AUTO: 0.8 % — SIGNIFICANT CHANGE UP (ref 0–0.9)
INR BLD: 1.5 RATIO — HIGH (ref 0.88–1.16)
LYMPHOCYTES # BLD AUTO: 1.3 K/UL — SIGNIFICANT CHANGE UP (ref 1–3.3)
LYMPHOCYTES # BLD AUTO: 8.5 % — LOW (ref 13–44)
MAGNESIUM SERPL-MCNC: 2 MG/DL — SIGNIFICANT CHANGE UP (ref 1.6–2.6)
MCHC RBC-ENTMCNC: 24.6 PG — LOW (ref 27–34)
MCHC RBC-ENTMCNC: 24.7 PG — LOW (ref 27–34)
MCHC RBC-ENTMCNC: 32.2 GM/DL — SIGNIFICANT CHANGE UP (ref 32–36)
MCHC RBC-ENTMCNC: 32.7 GM/DL — SIGNIFICANT CHANGE UP (ref 32–36)
MCV RBC AUTO: 75.2 FL — LOW (ref 80–100)
MCV RBC AUTO: 76.9 FL — LOW (ref 80–100)
MONOCYTES # BLD AUTO: 0.91 K/UL — HIGH (ref 0–0.9)
MONOCYTES NFR BLD AUTO: 5.9 % — SIGNIFICANT CHANGE UP (ref 2–14)
NEUTROPHILS # BLD AUTO: 12.75 K/UL — HIGH (ref 1.8–7.4)
NEUTROPHILS NFR BLD AUTO: 83.3 % — HIGH (ref 43–77)
NRBC # BLD: 0 /100 WBCS — SIGNIFICANT CHANGE UP (ref 0–0)
NRBC # BLD: 0 /100 WBCS — SIGNIFICANT CHANGE UP (ref 0–0)
NRBC # FLD: 0 K/UL — SIGNIFICANT CHANGE UP (ref 0–0)
NRBC # FLD: 0 K/UL — SIGNIFICANT CHANGE UP (ref 0–0)
PHOSPHATE SERPL-MCNC: 4 MG/DL — SIGNIFICANT CHANGE UP (ref 2.5–4.5)
PLATELET # BLD AUTO: 168 K/UL — SIGNIFICANT CHANGE UP (ref 150–400)
PLATELET # BLD AUTO: 185 K/UL — SIGNIFICANT CHANGE UP (ref 150–400)
POTASSIUM SERPL-MCNC: 3.8 MMOL/L — SIGNIFICANT CHANGE UP (ref 3.5–5.3)
POTASSIUM SERPL-MCNC: 4.1 MMOL/L — SIGNIFICANT CHANGE UP (ref 3.5–5.3)
POTASSIUM SERPL-SCNC: 3.8 MMOL/L — SIGNIFICANT CHANGE UP (ref 3.5–5.3)
POTASSIUM SERPL-SCNC: 4.1 MMOL/L — SIGNIFICANT CHANGE UP (ref 3.5–5.3)
PROTHROM AB SERPL-ACNC: 17.5 SEC — HIGH (ref 10.5–13.4)
RBC # BLD: 4.39 M/UL — SIGNIFICANT CHANGE UP (ref 3.8–5.2)
RBC # BLD: 4.45 M/UL — SIGNIFICANT CHANGE UP (ref 3.8–5.2)
RBC # FLD: 14.7 % — HIGH (ref 10.3–14.5)
RBC # FLD: 15.1 % — HIGH (ref 10.3–14.5)
RH IG SCN BLD-IMP: POSITIVE — SIGNIFICANT CHANGE UP
SODIUM SERPL-SCNC: 130 MMOL/L — LOW (ref 135–145)
SODIUM SERPL-SCNC: 131 MMOL/L — LOW (ref 135–145)
WBC # BLD: 15.32 K/UL — HIGH (ref 3.8–10.5)
WBC # BLD: 9.03 K/UL — SIGNIFICANT CHANGE UP (ref 3.8–10.5)
WBC # FLD AUTO: 15.32 K/UL — HIGH (ref 3.8–10.5)
WBC # FLD AUTO: 9.03 K/UL — SIGNIFICANT CHANGE UP (ref 3.8–10.5)

## 2023-01-26 PROCEDURE — 88341 IMHCHEM/IMCYTCHM EA ADD ANTB: CPT | Mod: 26

## 2023-01-26 PROCEDURE — 88304 TISSUE EXAM BY PATHOLOGIST: CPT | Mod: 26

## 2023-01-26 PROCEDURE — 72170 X-RAY EXAM OF PELVIS: CPT | Mod: 26

## 2023-01-26 PROCEDURE — 88311 DECALCIFY TISSUE: CPT | Mod: 26

## 2023-01-26 PROCEDURE — 88342 IMHCHEM/IMCYTCHM 1ST ANTB: CPT | Mod: 26

## 2023-01-26 PROCEDURE — 88360 TUMOR IMMUNOHISTOCHEM/MANUAL: CPT | Mod: 26,59

## 2023-01-26 PROCEDURE — 99232 SBSQ HOSP IP/OBS MODERATE 35: CPT

## 2023-01-26 PROCEDURE — 27236 TREAT THIGH FRACTURE: CPT | Mod: LT

## 2023-01-26 DEVICE — RESTRIC CEM BUCK 18.5MM: Type: IMPLANTABLE DEVICE | Status: FUNCTIONAL

## 2023-01-26 DEVICE — IMPLANTABLE DEVICE: Type: IMPLANTABLE DEVICE | Status: FUNCTIONAL

## 2023-01-26 DEVICE — FEM HEAD LFIT V40 26MM: Type: IMPLANTABLE DEVICE | Status: FUNCTIONAL

## 2023-01-26 DEVICE — HEAD FEM BI-P 26MM 41MM: Type: IMPLANTABLE DEVICE | Status: FUNCTIONAL

## 2023-01-26 DEVICE — CEMENT SIMPLEX WITH TOBRAMYCIN: Type: IMPLANTABLE DEVICE | Status: FUNCTIONAL

## 2023-01-26 RX ORDER — OXYCODONE HYDROCHLORIDE 5 MG/1
20 TABLET ORAL
Refills: 0 | Status: DISCONTINUED | OUTPATIENT
Start: 2023-01-26 | End: 2023-01-27

## 2023-01-26 RX ORDER — SODIUM CHLORIDE 9 MG/ML
1000 INJECTION INTRAMUSCULAR; INTRAVENOUS; SUBCUTANEOUS
Refills: 0 | Status: DISCONTINUED | OUTPATIENT
Start: 2023-01-26 | End: 2023-01-27

## 2023-01-26 RX ORDER — CEFAZOLIN SODIUM 1 G
2000 VIAL (EA) INJECTION EVERY 8 HOURS
Refills: 0 | Status: COMPLETED | OUTPATIENT
Start: 2023-01-26 | End: 2023-01-27

## 2023-01-26 RX ORDER — OXYCODONE HYDROCHLORIDE 5 MG/1
10 TABLET ORAL EVERY 6 HOURS
Refills: 0 | Status: DISCONTINUED | OUTPATIENT
Start: 2023-01-26 | End: 2023-01-27

## 2023-01-26 RX ORDER — ONDANSETRON 8 MG/1
4 TABLET, FILM COATED ORAL ONCE
Refills: 0 | Status: DISCONTINUED | OUTPATIENT
Start: 2023-01-26 | End: 2023-01-26

## 2023-01-26 RX ORDER — OXYCODONE HYDROCHLORIDE 5 MG/1
15 TABLET ORAL
Refills: 0 | Status: DISCONTINUED | OUTPATIENT
Start: 2023-01-26 | End: 2023-01-27

## 2023-01-26 RX ORDER — HYDROMORPHONE HYDROCHLORIDE 2 MG/ML
0.5 INJECTION INTRAMUSCULAR; INTRAVENOUS; SUBCUTANEOUS
Refills: 0 | Status: DISCONTINUED | OUTPATIENT
Start: 2023-01-26 | End: 2023-01-26

## 2023-01-26 RX ORDER — ENOXAPARIN SODIUM 100 MG/ML
40 INJECTION SUBCUTANEOUS EVERY 24 HOURS
Refills: 0 | Status: DISCONTINUED | OUTPATIENT
Start: 2023-01-27 | End: 2023-01-27

## 2023-01-26 RX ORDER — METHADONE HYDROCHLORIDE 40 MG/1
10 TABLET ORAL EVERY 12 HOURS
Refills: 0 | Status: DISCONTINUED | OUTPATIENT
Start: 2023-01-26 | End: 2023-01-27

## 2023-01-26 RX ORDER — HYDROMORPHONE HYDROCHLORIDE 2 MG/ML
1 INJECTION INTRAMUSCULAR; INTRAVENOUS; SUBCUTANEOUS
Refills: 0 | Status: DISCONTINUED | OUTPATIENT
Start: 2023-01-26 | End: 2023-01-26

## 2023-01-26 RX ORDER — ACETAMINOPHEN 500 MG
975 TABLET ORAL EVERY 8 HOURS
Refills: 0 | Status: DISCONTINUED | OUTPATIENT
Start: 2023-01-26 | End: 2023-02-03

## 2023-01-26 RX ADMIN — Medication 975 MILLIGRAM(S): at 23:28

## 2023-01-26 RX ADMIN — Medication 5 MILLIGRAM(S): at 21:09

## 2023-01-26 RX ADMIN — METHADONE HYDROCHLORIDE 10 MILLIGRAM(S): 40 TABLET ORAL at 05:14

## 2023-01-26 RX ADMIN — Medication 1 UNIT(S): at 17:58

## 2023-01-26 RX ADMIN — HYDROMORPHONE HYDROCHLORIDE 0.5 MILLIGRAM(S): 2 INJECTION INTRAMUSCULAR; INTRAVENOUS; SUBCUTANEOUS at 11:00

## 2023-01-26 RX ADMIN — GABAPENTIN 300 MILLIGRAM(S): 400 CAPSULE ORAL at 21:09

## 2023-01-26 RX ADMIN — METHADONE HYDROCHLORIDE 10 MILLIGRAM(S): 40 TABLET ORAL at 17:54

## 2023-01-26 RX ADMIN — GABAPENTIN 300 MILLIGRAM(S): 400 CAPSULE ORAL at 05:37

## 2023-01-26 RX ADMIN — Medication 4: at 13:27

## 2023-01-26 RX ADMIN — OXYCODONE HYDROCHLORIDE 10 MILLIGRAM(S): 5 TABLET ORAL at 21:11

## 2023-01-26 RX ADMIN — GABAPENTIN 300 MILLIGRAM(S): 400 CAPSULE ORAL at 13:27

## 2023-01-26 RX ADMIN — OXYCODONE HYDROCHLORIDE 10 MILLIGRAM(S): 5 TABLET ORAL at 06:13

## 2023-01-26 RX ADMIN — POLYETHYLENE GLYCOL 3350 17 GRAM(S): 17 POWDER, FOR SOLUTION ORAL at 17:53

## 2023-01-26 RX ADMIN — SODIUM CHLORIDE 125 MILLILITER(S): 9 INJECTION INTRAMUSCULAR; INTRAVENOUS; SUBCUTANEOUS at 18:54

## 2023-01-26 RX ADMIN — CHLORHEXIDINE GLUCONATE 1 APPLICATION(S): 213 SOLUTION TOPICAL at 12:46

## 2023-01-26 RX ADMIN — OXYCODONE HYDROCHLORIDE 10 MILLIGRAM(S): 5 TABLET ORAL at 05:13

## 2023-01-26 RX ADMIN — HYDROMORPHONE HYDROCHLORIDE 0.5 MILLIGRAM(S): 2 INJECTION INTRAMUSCULAR; INTRAVENOUS; SUBCUTANEOUS at 10:01

## 2023-01-26 RX ADMIN — OXYCODONE HYDROCHLORIDE 10 MILLIGRAM(S): 5 TABLET ORAL at 12:49

## 2023-01-26 RX ADMIN — CHLORHEXIDINE GLUCONATE 1 APPLICATION(S): 213 SOLUTION TOPICAL at 05:39

## 2023-01-26 RX ADMIN — OXYCODONE HYDROCHLORIDE 10 MILLIGRAM(S): 5 TABLET ORAL at 13:33

## 2023-01-26 RX ADMIN — INSULIN GLARGINE 4 UNIT(S): 100 INJECTION, SOLUTION SUBCUTANEOUS at 23:29

## 2023-01-26 RX ADMIN — HYDROMORPHONE HYDROCHLORIDE 0.5 MILLIGRAM(S): 2 INJECTION INTRAMUSCULAR; INTRAVENOUS; SUBCUTANEOUS at 10:28

## 2023-01-26 RX ADMIN — HYDROMORPHONE HYDROCHLORIDE 0.5 MILLIGRAM(S): 2 INJECTION INTRAMUSCULAR; INTRAVENOUS; SUBCUTANEOUS at 10:26

## 2023-01-26 RX ADMIN — OXYCODONE HYDROCHLORIDE 20 MILLIGRAM(S): 5 TABLET ORAL at 06:31

## 2023-01-26 RX ADMIN — ONDANSETRON 4 MILLIGRAM(S): 8 TABLET, FILM COATED ORAL at 13:28

## 2023-01-26 RX ADMIN — Medication 1 TABLET(S): at 12:46

## 2023-01-26 RX ADMIN — Medication 1 UNIT(S): at 13:26

## 2023-01-26 RX ADMIN — ONDANSETRON 4 MILLIGRAM(S): 8 TABLET, FILM COATED ORAL at 21:13

## 2023-01-26 RX ADMIN — Medication 400 UNIT(S): at 12:46

## 2023-01-26 RX ADMIN — SENNA PLUS 2 TABLET(S): 8.6 TABLET ORAL at 21:10

## 2023-01-26 RX ADMIN — Medication 3 MILLIGRAM(S): at 21:10

## 2023-01-26 RX ADMIN — ONDANSETRON 4 MILLIGRAM(S): 8 TABLET, FILM COATED ORAL at 05:37

## 2023-01-26 RX ADMIN — Medication 100 MILLIGRAM(S): at 17:52

## 2023-01-26 RX ADMIN — MONTELUKAST 10 MILLIGRAM(S): 4 TABLET, CHEWABLE ORAL at 21:09

## 2023-01-26 RX ADMIN — OXYCODONE HYDROCHLORIDE 10 MILLIGRAM(S): 5 TABLET ORAL at 00:03

## 2023-01-26 NOTE — PROGRESS NOTE ADULT - PROBLEM SELECTOR PLAN 4
- home meds: Januvia 100mg, metformin 500mg BID  - 10/2022 A1C 7.4%, well controlled  - moderate ISS; started glargine 6u hs; decreasing to 4u hs as pt will be NPO after MN  - monitor FS qac and qHS  - started on steroids- monitor for steroid-induced hyperglycemia

## 2023-01-26 NOTE — PROGRESS NOTE ADULT - SUBJECTIVE AND OBJECTIVE BOX
ORTHO PROGRESS NOTE     Pt seen and examined at bedside, denies SOB, CP, Dizziness. N/V/D /HA.  No significant overnight events. Pain well controlled.    Vital Signs Last 24 Hrs  T(C): 36.9 (26 Jan 2023 05:36), Max: 36.9 (26 Jan 2023 05:36)  T(F): 98.5 (26 Jan 2023 05:36), Max: 98.5 (26 Jan 2023 05:36)  HR: 66 (26 Jan 2023 05:36) (66 - 87)  BP: 110/74 (26 Jan 2023 05:36) (110/74 - 134/75)  BP(mean): --  RR: 18 (26 Jan 2023 05:36) (16 - 18)  SpO2: 100% (26 Jan 2023 05:36) (98% - 100%)    Parameters below as of 26 Jan 2023 05:36  Patient On (Oxygen Delivery Method): room air        Gen: NAD, alert and oriented  Resp: Unlabored breathing  LLE: Skin intact       SILT DP/SP/ Denise/Saph/tib       5/5 EHL 5/5 FHL 5/5 TA 5/5 Gastroc 5/5 IP        DP+,        soft compartments, no calf ttp,       + log roll        Labs:  CBC Full  -  ( 26 Jan 2023 01:46 )  WBC Count : 9.03 K/uL  RBC Count : 4.39 M/uL  Hemoglobin : 10.8 g/dL  Hematocrit : 33.0 %  Platelet Count - Automated : 168 K/uL  Mean Cell Volume : 75.2 fL  Mean Cell Hemoglobin : 24.6 pg  Mean Cell Hemoglobin Concentration : 32.7 gm/dL  Auto Neutrophil # : x  Auto Lymphocyte # : x  Auto Monocyte # : x  Auto Eosinophil # : x  Auto Basophil # : x  Auto Neutrophil % : x  Auto Lymphocyte % : x  Auto Monocyte % : x  Auto Eosinophil % : x  Auto Basophil % : x      01-26    131<L>  |  96<L>  |  14  ----------------------------<  213<H>  3.8   |  23  |  0.50    Ca    8.8      26 Jan 2023 01:46  Phos  4.0     01-26  Mg     2.00     01-26        A/P  Pt is a 58y Female w/ L pathologic femoral neck fx. Plan for OR today w/ Dr Villa    - Pain control/ Analgesia  - NPO and IVF  - Hold DVT PPX  - NWB LLE  - Appreciate medical clearance  -  labs

## 2023-01-26 NOTE — PROGRESS NOTE ADULT - PROBLEM SELECTOR PLAN 1
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 Also can give dose IV Morphine prior to going for RT. Pain still not controlled, will f/u with palliative care for further recs  - bowel regimen while on opioids  - Completed course of RT on 1/24.  - XR hip with L pathological femoral neck fracture; orthopedics following, s/p L hip hemiarthroplasty on 1/26

## 2023-01-26 NOTE — PROGRESS NOTE ADULT - SUBJECTIVE AND OBJECTIVE BOX
Patient is a 58y old  Female who presents with a chief complaint of Difficulty ambulating (26 Jan 2023 11:06)      SUBJECTIVE / OVERNIGHT EVENTS:    No events overnight. This AM, patient underwent L hip hemiarthroplasty. Pt without n/v/d/cp/sob.      MEDICATIONS  (STANDING):  acetaminophen     Tablet .. 975 milliGRAM(s) Oral every 8 hours  bisacodyl 5 milliGRAM(s) Oral at bedtime  ceFAZolin   IVPB 2000 milliGRAM(s) IV Intermittent every 8 hours  chlorhexidine 2% Cloths 1 Application(s) Topical daily  cholecalciferol 400 Unit(s) Oral daily  dextrose 5%. 1000 milliLiter(s) (100 mL/Hr) IV Continuous <Continuous>  dextrose 5%. 1000 milliLiter(s) (50 mL/Hr) IV Continuous <Continuous>  dextrose 50% Injectable 25 Gram(s) IV Push once  dextrose 50% Injectable 12.5 Gram(s) IV Push once  dextrose 50% Injectable 25 Gram(s) IV Push once  gabapentin 300 milliGRAM(s) Oral three times a day  glucagon  Injectable 1 milliGRAM(s) IntraMuscular once  influenza   Vaccine 0.5 milliLiter(s) IntraMuscular once  insulin glargine Injectable (LANTUS) 4 Unit(s) SubCutaneous at bedtime  insulin lispro (ADMELOG) corrective regimen sliding scale   SubCutaneous three times a day before meals  insulin lispro (ADMELOG) corrective regimen sliding scale   SubCutaneous at bedtime  insulin lispro Injectable (ADMELOG) 1 Unit(s) SubCutaneous three times a day before meals  lidocaine   4% Patch 1 Patch Transdermal daily  melatonin 3 milliGRAM(s) Oral at bedtime  methadone    Tablet 10 milliGRAM(s) Oral every 12 hours  montelukast 10 milliGRAM(s) Oral at bedtime  multivitamin 1 Tablet(s) Oral daily  ondansetron   Disintegrating Tablet 4 milliGRAM(s) Oral every 8 hours  oxyCODONE    IR 10 milliGRAM(s) Oral every 6 hours  pantoprazole    Tablet 40 milliGRAM(s) Oral before breakfast  polyethylene glycol 3350 17 Gram(s) Oral two times a day  povidone iodine 5% Nasal Swab 1 Application(s) Both Nostrils once  senna 2 Tablet(s) Oral at bedtime  sodium chloride 0.9%. 1000 milliLiter(s) (125 mL/Hr) IV Continuous <Continuous>    MEDICATIONS  (PRN):  dextrose Oral Gel 15 Gram(s) Oral once PRN Blood Glucose LESS THAN 70 milliGRAM(s)/deciliter  meclizine 12.5 milliGRAM(s) Oral every 6 hours PRN Dizziness  naloxone 1 mG/mL Injection for Intranasal Use 0.4 milliGRAM(s) IntraNasal every 3 minutes PRN overdose  oxyCODONE    IR 15 milliGRAM(s) Oral every 3 hours PRN Moderate Pain (4 - 6)  oxyCODONE    IR 20 milliGRAM(s) Oral every 3 hours PRN Severe Pain (7 - 10)  prochlorperazine   Injectable 5 milliGRAM(s) IV Push every 8 hours PRN refractory nausea      PHYSICAL EXAM:  T(C): 36.7 (01-26-23 @ 11:00), Max: 37.1 (01-26-23 @ 07:10)  HR: 82 (01-26-23 @ 11:15) (66 - 96)  BP: 118/68 (01-26-23 @ 11:15) (110/74 - 144/82)  RR: 18 (01-26-23 @ 11:15) (13 - 22)  SpO2: 97% (01-26-23 @ 11:15) (94% - 100%)  I&O's Summary    26 Jan 2023 07:01  -  26 Jan 2023 13:37  --------------------------------------------------------  IN: 150 mL / OUT: 0 mL / NET: 150 mL      GENERAL: NAD, well-developed, lying in bed  HEAD:  Atraumatic, Normocephalic, MMM  CHEST/LUNG: No use of accessory muscles, CTAB, breathing non-labored  COR: RR, no mrcg  ABD: Soft, ND/NT, +BS  PSYCH: AAOx3  NEUROLOGY: CN II-XII grossly intact, moving all extremities  SKIN: No rashes or lesions  EXT: wwp, no cce    LABS:  CAPILLARY BLOOD GLUCOSE      POCT Blood Glucose.: 225 mg/dL (26 Jan 2023 13:19)  POCT Blood Glucose.: 229 mg/dL (26 Jan 2023 11:43)  POCT Blood Glucose.: 164 mg/dL (26 Jan 2023 09:46)  POCT Blood Glucose.: 159 mg/dL (26 Jan 2023 06:45)  POCT Blood Glucose.: 126 mg/dL (25 Jan 2023 22:25)  POCT Blood Glucose.: 187 mg/dL (25 Jan 2023 18:10)                          11.0   15.32 )-----------( 185      ( 26 Jan 2023 09:58 )             34.2     01-26    130<L>  |  96<L>  |  14  ----------------------------<  166<H>  4.1   |  24  |  0.55    Ca    8.8      26 Jan 2023 09:58  Phos  4.0     01-26  Mg     2.00     01-26      PT/INR - ( 26 Jan 2023 01:46 )   PT: 17.5 sec;   INR: 1.50 ratio         PTT - ( 26 Jan 2023 01:46 )  PTT:28.5 sec            RADIOLOGY & ADDITIONAL TESTS:    Telemetry Personally Reviewed -     Imaging Personally Reviewed -     Imaging Reviewed -     Consultant(s) Notes Reviewed -   ortho    Care Discussed with Consultants/Other Providers -

## 2023-01-26 NOTE — PRE-OP CHECKLIST - SELECT TESTS ORDERED
BMP/CBC/PT/PTT/INR/Type and Screen/HCG/COVID-19 BMP/CBC/PT/PTT/INR/Type and Cross/Type and Screen/HCG/COVID-19

## 2023-01-26 NOTE — PROGRESS NOTE ADULT - PROBLEM SELECTOR PLAN 8
DVT ppx: Lovenox  PT recommending FARIBA, however pt wants to go home  code: full code  Updated pt's daughter at bedside on 1/26, all questions answered

## 2023-01-26 NOTE — PROGRESS NOTE ADULT - SUBJECTIVE AND OBJECTIVE BOX
ORTHO POC      Patient resting comfortably without complaint s/p left hip hemiarthroplasty  today     Vital Signs Last 24 Hrs  T(C): 36.7 (26 Jan 2023 11:00), Max: 37.1 (26 Jan 2023 07:10)  T(F): 98 (26 Jan 2023 11:00), Max: 98.8 (26 Jan 2023 07:10)  HR: 76 (26 Jan 2023 11:00) (66 - 96)  BP: 115/63 (26 Jan 2023 11:00) (110/74 - 144/82)  BP(mean): 74 (26 Jan 2023 11:00) (70 - 94)  RR: 18 (26 Jan 2023 11:00) (13 - 22)  SpO2: 97% (26 Jan 2023 11:00) (94% - 100%)    Parameters below as of 26 Jan 2023 11:00  Patient On (Oxygen Delivery Method): room air        left hip  : dressing clean/dry/ intact             LE:  EHL/GC/TA 5/5                  sensory intact                   DP pulse 2+    Labs :                      11.0   15.32 )-----------( 185      ( 26 Jan 2023 09:58 )             34.2                 01-26    130<L>  |  96<L>  |  14  ----------------------------<  166<H>  4.1   |  24  |  0.55    Ca    8.8      26 Jan 2023 09:58  Phos  4.0     01-26  Mg     2.00     01-26        U/O:  DTV    A/P: Stable postop            PT- WBAT            DVT prophylaxis- venodynes/ Lovenox           Pain Control            Incentive Spirometer            Antibiotics x 24 hr            Follow up AM labs

## 2023-01-27 LAB
ANION GAP SERPL CALC-SCNC: 7 MMOL/L — SIGNIFICANT CHANGE UP (ref 7–14)
APTT BLD: 24.7 SEC — LOW (ref 27–36.3)
BASOPHILS # BLD AUTO: 0.01 K/UL — SIGNIFICANT CHANGE UP (ref 0–0.2)
BASOPHILS NFR BLD AUTO: 0.1 % — SIGNIFICANT CHANGE UP (ref 0–2)
BUN SERPL-MCNC: 14 MG/DL — SIGNIFICANT CHANGE UP (ref 7–23)
CALCIUM SERPL-MCNC: 7.6 MG/DL — LOW (ref 8.4–10.5)
CHLORIDE SERPL-SCNC: 101 MMOL/L — SIGNIFICANT CHANGE UP (ref 98–107)
CO2 SERPL-SCNC: 23 MMOL/L — SIGNIFICANT CHANGE UP (ref 22–31)
CREAT SERPL-MCNC: 0.57 MG/DL — SIGNIFICANT CHANGE UP (ref 0.5–1.3)
EGFR: 106 ML/MIN/1.73M2 — SIGNIFICANT CHANGE UP
EOSINOPHIL # BLD AUTO: 0.04 K/UL — SIGNIFICANT CHANGE UP (ref 0–0.5)
EOSINOPHIL NFR BLD AUTO: 0.6 % — SIGNIFICANT CHANGE UP (ref 0–6)
GLUCOSE BLDC GLUCOMTR-MCNC: 107 MG/DL — HIGH (ref 70–99)
GLUCOSE BLDC GLUCOMTR-MCNC: 108 MG/DL — HIGH (ref 70–99)
GLUCOSE BLDC GLUCOMTR-MCNC: 165 MG/DL — HIGH (ref 70–99)
GLUCOSE BLDC GLUCOMTR-MCNC: 205 MG/DL — HIGH (ref 70–99)
GLUCOSE BLDC GLUCOMTR-MCNC: 227 MG/DL — HIGH (ref 70–99)
GLUCOSE SERPL-MCNC: 199 MG/DL — HIGH (ref 70–99)
HCT VFR BLD CALC: 20.4 % — CRITICAL LOW (ref 34.5–45)
HCT VFR BLD CALC: 21.8 % — LOW (ref 34.5–45)
HCT VFR BLD CALC: 31.8 % — LOW (ref 34.5–45)
HGB BLD-MCNC: 10.5 G/DL — LOW (ref 11.5–15.5)
HGB BLD-MCNC: 6.6 G/DL — CRITICAL LOW (ref 11.5–15.5)
HGB BLD-MCNC: 7 G/DL — CRITICAL LOW (ref 11.5–15.5)
IANC: 4.89 K/UL — SIGNIFICANT CHANGE UP (ref 1.8–7.4)
IMM GRANULOCYTES NFR BLD AUTO: 0.4 % — SIGNIFICANT CHANGE UP (ref 0–0.9)
INR BLD: 1.47 RATIO — HIGH (ref 0.88–1.16)
LYMPHOCYTES # BLD AUTO: 0.99 K/UL — LOW (ref 1–3.3)
LYMPHOCYTES # BLD AUTO: 14.6 % — SIGNIFICANT CHANGE UP (ref 13–44)
MAGNESIUM SERPL-MCNC: 1.7 MG/DL — SIGNIFICANT CHANGE UP (ref 1.6–2.6)
MCHC RBC-ENTMCNC: 24.5 PG — LOW (ref 27–34)
MCHC RBC-ENTMCNC: 24.5 PG — LOW (ref 27–34)
MCHC RBC-ENTMCNC: 26.1 PG — LOW (ref 27–34)
MCHC RBC-ENTMCNC: 32.1 GM/DL — SIGNIFICANT CHANGE UP (ref 32–36)
MCHC RBC-ENTMCNC: 32.4 GM/DL — SIGNIFICANT CHANGE UP (ref 32–36)
MCHC RBC-ENTMCNC: 33 GM/DL — SIGNIFICANT CHANGE UP (ref 32–36)
MCV RBC AUTO: 75.8 FL — LOW (ref 80–100)
MCV RBC AUTO: 76.2 FL — LOW (ref 80–100)
MCV RBC AUTO: 78.9 FL — LOW (ref 80–100)
MONOCYTES # BLD AUTO: 0.81 K/UL — SIGNIFICANT CHANGE UP (ref 0–0.9)
MONOCYTES NFR BLD AUTO: 12 % — SIGNIFICANT CHANGE UP (ref 2–14)
NEUTROPHILS # BLD AUTO: 4.89 K/UL — SIGNIFICANT CHANGE UP (ref 1.8–7.4)
NEUTROPHILS NFR BLD AUTO: 72.3 % — SIGNIFICANT CHANGE UP (ref 43–77)
NRBC # BLD: 0 /100 WBCS — SIGNIFICANT CHANGE UP (ref 0–0)
NRBC # FLD: 0 K/UL — SIGNIFICANT CHANGE UP (ref 0–0)
PHOSPHATE SERPL-MCNC: 2.8 MG/DL — SIGNIFICANT CHANGE UP (ref 2.5–4.5)
PLATELET # BLD AUTO: 86 K/UL — LOW (ref 150–400)
PLATELET # BLD AUTO: 87 K/UL — LOW (ref 150–400)
PLATELET # BLD AUTO: 90 K/UL — LOW (ref 150–400)
POTASSIUM SERPL-MCNC: 3.7 MMOL/L — SIGNIFICANT CHANGE UP (ref 3.5–5.3)
POTASSIUM SERPL-SCNC: 3.7 MMOL/L — SIGNIFICANT CHANGE UP (ref 3.5–5.3)
PROTHROM AB SERPL-ACNC: 17.1 SEC — HIGH (ref 10.5–13.4)
RBC # BLD: 2.69 M/UL — LOW (ref 3.8–5.2)
RBC # BLD: 2.86 M/UL — LOW (ref 3.8–5.2)
RBC # BLD: 4.03 M/UL — SIGNIFICANT CHANGE UP (ref 3.8–5.2)
RBC # FLD: 14.4 % — SIGNIFICANT CHANGE UP (ref 10.3–14.5)
RBC # FLD: 14.6 % — HIGH (ref 10.3–14.5)
RBC # FLD: 14.8 % — HIGH (ref 10.3–14.5)
SODIUM SERPL-SCNC: 131 MMOL/L — LOW (ref 135–145)
WBC # BLD: 5.46 K/UL — SIGNIFICANT CHANGE UP (ref 3.8–10.5)
WBC # BLD: 6.77 K/UL — SIGNIFICANT CHANGE UP (ref 3.8–10.5)
WBC # BLD: 6.95 K/UL — SIGNIFICANT CHANGE UP (ref 3.8–10.5)
WBC # FLD AUTO: 5.46 K/UL — SIGNIFICANT CHANGE UP (ref 3.8–10.5)
WBC # FLD AUTO: 6.77 K/UL — SIGNIFICANT CHANGE UP (ref 3.8–10.5)
WBC # FLD AUTO: 6.95 K/UL — SIGNIFICANT CHANGE UP (ref 3.8–10.5)

## 2023-01-27 PROCEDURE — 99254 IP/OBS CNSLTJ NEW/EST MOD 60: CPT | Mod: GC

## 2023-01-27 PROCEDURE — 99232 SBSQ HOSP IP/OBS MODERATE 35: CPT

## 2023-01-27 PROCEDURE — 99233 SBSQ HOSP IP/OBS HIGH 50: CPT

## 2023-01-27 PROCEDURE — 73701 CT LOWER EXTREMITY W/DYE: CPT | Mod: 26,LT

## 2023-01-27 PROCEDURE — 74177 CT ABD & PELVIS W/CONTRAST: CPT | Mod: 26

## 2023-01-27 RX ORDER — SODIUM CHLORIDE 9 MG/ML
1000 INJECTION INTRAMUSCULAR; INTRAVENOUS; SUBCUTANEOUS
Refills: 0 | Status: COMPLETED | OUTPATIENT
Start: 2023-01-27 | End: 2023-01-28

## 2023-01-27 RX ORDER — METHADONE HYDROCHLORIDE 40 MG/1
10 TABLET ORAL EVERY 12 HOURS
Refills: 0 | Status: DISCONTINUED | OUTPATIENT
Start: 2023-01-27 | End: 2023-02-02

## 2023-01-27 RX ORDER — SODIUM CHLORIDE 9 MG/ML
1000 INJECTION INTRAMUSCULAR; INTRAVENOUS; SUBCUTANEOUS
Refills: 0 | Status: DISCONTINUED | OUTPATIENT
Start: 2023-01-27 | End: 2023-01-27

## 2023-01-27 RX ORDER — SODIUM CHLORIDE 9 MG/ML
500 INJECTION INTRAMUSCULAR; INTRAVENOUS; SUBCUTANEOUS ONCE
Refills: 0 | Status: DISCONTINUED | OUTPATIENT
Start: 2023-01-27 | End: 2023-01-27

## 2023-01-27 RX ORDER — OXYCODONE HYDROCHLORIDE 5 MG/1
10 TABLET ORAL
Refills: 0 | Status: DISCONTINUED | OUTPATIENT
Start: 2023-01-27 | End: 2023-01-30

## 2023-01-27 RX ORDER — OXYCODONE HYDROCHLORIDE 5 MG/1
5 TABLET ORAL EVERY 6 HOURS
Refills: 0 | Status: DISCONTINUED | OUTPATIENT
Start: 2023-01-27 | End: 2023-01-30

## 2023-01-27 RX ORDER — CEFAZOLIN SODIUM 1 G
2000 VIAL (EA) INJECTION ONCE
Refills: 0 | Status: COMPLETED | OUTPATIENT
Start: 2023-01-27 | End: 2023-01-27

## 2023-01-27 RX ORDER — SODIUM CHLORIDE 9 MG/ML
500 INJECTION INTRAMUSCULAR; INTRAVENOUS; SUBCUTANEOUS ONCE
Refills: 0 | Status: COMPLETED | OUTPATIENT
Start: 2023-01-27 | End: 2023-01-27

## 2023-01-27 RX ORDER — OXYCODONE HYDROCHLORIDE 5 MG/1
15 TABLET ORAL
Refills: 0 | Status: DISCONTINUED | OUTPATIENT
Start: 2023-01-27 | End: 2023-01-30

## 2023-01-27 RX ADMIN — Medication 975 MILLIGRAM(S): at 06:42

## 2023-01-27 RX ADMIN — OXYCODONE HYDROCHLORIDE 5 MILLIGRAM(S): 5 TABLET ORAL at 17:56

## 2023-01-27 RX ADMIN — Medication 5 MILLIGRAM(S): at 22:18

## 2023-01-27 RX ADMIN — MONTELUKAST 10 MILLIGRAM(S): 4 TABLET, CHEWABLE ORAL at 22:19

## 2023-01-27 RX ADMIN — SODIUM CHLORIDE 75 MILLILITER(S): 9 INJECTION INTRAMUSCULAR; INTRAVENOUS; SUBCUTANEOUS at 19:24

## 2023-01-27 RX ADMIN — ONDANSETRON 4 MILLIGRAM(S): 8 TABLET, FILM COATED ORAL at 06:44

## 2023-01-27 RX ADMIN — OXYCODONE HYDROCHLORIDE 10 MILLIGRAM(S): 5 TABLET ORAL at 12:38

## 2023-01-27 RX ADMIN — SODIUM CHLORIDE 125 MILLILITER(S): 9 INJECTION INTRAMUSCULAR; INTRAVENOUS; SUBCUTANEOUS at 07:18

## 2023-01-27 RX ADMIN — OXYCODONE HYDROCHLORIDE 15 MILLIGRAM(S): 5 TABLET ORAL at 16:11

## 2023-01-27 RX ADMIN — GABAPENTIN 300 MILLIGRAM(S): 400 CAPSULE ORAL at 14:27

## 2023-01-27 RX ADMIN — METHADONE HYDROCHLORIDE 10 MILLIGRAM(S): 40 TABLET ORAL at 17:56

## 2023-01-27 RX ADMIN — GABAPENTIN 300 MILLIGRAM(S): 400 CAPSULE ORAL at 22:17

## 2023-01-27 RX ADMIN — Medication 1 UNIT(S): at 09:17

## 2023-01-27 RX ADMIN — Medication 4: at 17:56

## 2023-01-27 RX ADMIN — INSULIN GLARGINE 4 UNIT(S): 100 INJECTION, SOLUTION SUBCUTANEOUS at 22:26

## 2023-01-27 RX ADMIN — OXYCODONE HYDROCHLORIDE 10 MILLIGRAM(S): 5 TABLET ORAL at 13:38

## 2023-01-27 RX ADMIN — SODIUM CHLORIDE 75 MILLILITER(S): 9 INJECTION INTRAMUSCULAR; INTRAVENOUS; SUBCUTANEOUS at 04:50

## 2023-01-27 RX ADMIN — ONDANSETRON 4 MILLIGRAM(S): 8 TABLET, FILM COATED ORAL at 22:17

## 2023-01-27 RX ADMIN — SODIUM CHLORIDE 1000 MILLILITER(S): 9 INJECTION INTRAMUSCULAR; INTRAVENOUS; SUBCUTANEOUS at 02:30

## 2023-01-27 RX ADMIN — GABAPENTIN 300 MILLIGRAM(S): 400 CAPSULE ORAL at 06:43

## 2023-01-27 RX ADMIN — CHLORHEXIDINE GLUCONATE 1 APPLICATION(S): 213 SOLUTION TOPICAL at 12:39

## 2023-01-27 RX ADMIN — Medication 400 UNIT(S): at 12:38

## 2023-01-27 RX ADMIN — SODIUM CHLORIDE 1000 MILLILITER(S): 9 INJECTION INTRAMUSCULAR; INTRAVENOUS; SUBCUTANEOUS at 03:39

## 2023-01-27 RX ADMIN — Medication 1 TABLET(S): at 12:38

## 2023-01-27 RX ADMIN — PANTOPRAZOLE SODIUM 40 MILLIGRAM(S): 20 TABLET, DELAYED RELEASE ORAL at 06:42

## 2023-01-27 RX ADMIN — Medication 975 MILLIGRAM(S): at 22:18

## 2023-01-27 RX ADMIN — OXYCODONE HYDROCHLORIDE 20 MILLIGRAM(S): 5 TABLET ORAL at 09:22

## 2023-01-27 RX ADMIN — METHADONE HYDROCHLORIDE 10 MILLIGRAM(S): 40 TABLET ORAL at 06:42

## 2023-01-27 RX ADMIN — Medication 975 MILLIGRAM(S): at 14:27

## 2023-01-27 RX ADMIN — Medication 100 MILLIGRAM(S): at 03:34

## 2023-01-27 RX ADMIN — Medication 1 UNIT(S): at 17:57

## 2023-01-27 RX ADMIN — Medication 2: at 12:38

## 2023-01-27 RX ADMIN — Medication 1 UNIT(S): at 12:39

## 2023-01-27 RX ADMIN — OXYCODONE HYDROCHLORIDE 5 MILLIGRAM(S): 5 TABLET ORAL at 23:10

## 2023-01-27 RX ADMIN — OXYCODONE HYDROCHLORIDE 5 MILLIGRAM(S): 5 TABLET ORAL at 18:56

## 2023-01-27 NOTE — CHART NOTE - NSCHARTNOTEFT_GEN_A_CORE
Received call from ACP regarding patient’s hypotension and acute anemia. Previous 10-11 and suddenly down to 6 and repeat in 5s. Currently patient mentating but did received 1000cc of IVF without major improvements in BP. MAP barely 65. Instructed to get 2U PRBC stat and scan for abdominal/hip bleeding and ask MICU for consult. Patient is s/p L hip surgery so there is concern for L hip hematoma.

## 2023-01-27 NOTE — PROVIDER CONTACT NOTE (OTHER) - ACTION/TREATMENT ORDERED:
Deven Mosley NP notified, no labs needed
Leah Barrios notified, new order for IV Tylenol placed.
NP Deven Mosley notified, IV tylenol 625mg ordered
Administer IVP dexamethasone early - pain may be r/t to inflammation from radiation therapy.   Reassess pain s/p IVP dexamethasone
TIN Melendez notified, Bolus of 250mL LR ordered. ok to give oxycodone after bolus and rechecking BP
Second 500cc bolus administered as ordered. Post second bolus blood pressure electronically 88/46 and manually 98/50. CBC/BMP sent as ordered, one time fingerstick completed. NS @75cc started.
MD made aware

## 2023-01-27 NOTE — PHYSICAL THERAPY INITIAL EVALUATION ADULT - NS ASR WT BEARING DETAIL LLE
as per ortho documentation/weight-bearing as tolerated
Consulted with ANGEL Mcmillan  regarding WB status and as per Deyanira , TIN Iverson Clarified weight bearing status with Ortho team (TIN Prieto): partial weight bearing to LLE. However during PT session , PT requested pt to maintain NWB LLE due to severe LLE pain and mets to bones./partial weight-bearing

## 2023-01-27 NOTE — PROGRESS NOTE ADULT - PROBLEM SELECTOR PLAN 2
- likely in setting of known malignancy (chronic disease) and recent chemotherapy  - now improving, normalized WBC and platelet count; anemia likely in setting of neoplastic disease  - today patient with drop in Hgb, suspect acute blood loss anemia likely 2/2 L hip hemiarthroplasty; CT L hip and a/p w/o hemorrhage; will transfuse 2U pRBC, repeat CBC this PM; BP improving

## 2023-01-27 NOTE — PROGRESS NOTE ADULT - PROBLEM SELECTOR PLAN 1
- XRAY - Displaced pathologic left femoral neck fracture. No dislocations or additional fractures.  - Ortho recommendations appreciated - plan for OR on Thursday, 1/26/2023 for left hip hemiarthroplasty.   - pain management as below - XRAY - Displaced pathologic left femoral neck fracture. No dislocations or additional fractures.  - Ortho recommendations appreciated - s/p left hip hemiarthroplasty.   - pain management as below

## 2023-01-27 NOTE — PROGRESS NOTE ADULT - PROBLEM SELECTOR PLAN 4
- Patient reports having dizziness daily since prior to admission  - Will decrease ATC oxycodone dose as above to assess if this helps with dizziness and as methadone was increased on 1/23  - Discussed with primary team to consider further workup as dizziness has occurred prior to admission which is prior to increases in opioid doses   - workup/ management as per primary team - Patient reports having dizziness daily since prior to admission  -  Dizziness improving with decreased oxycodone dose   - further workup/ management as per primary team

## 2023-01-27 NOTE — CHART NOTE - NSCHARTNOTEFT_GEN_A_CORE
Notified by RN that patient's BP is 89/54. Normal saline 500 ml X1 ordered. Repeat BP noted to be 80/56. BP on the right arm noted to be -----.  Patient seen and assessed at the bedside. Patient is Mongolian speaking. Daughter present at the bedside. Interpretation done with the help of daughter. Patient reports  feeling weak and blurry vision. Patient endorses  that she gets blurry vision when her BP is low which is not new . Denies chest pain , dizziness , nausea , abdominal pain , fever and shortness of breath. On exam,  Speech is clear and Patient is able to follow commands. No facial droop noted. No focal neurological deficits noted. Dressing on left hip clean , dry and intact.     T(C): 36.7 (27 Jan 2023 02:00), Max: 37.1 (26 Jan 2023 07:10)  T(F): 98.1 (27 Jan 2023 02:00), Max: 98.8 (26 Jan 2023 07:10)  HR: 70 (27 Jan 2023 02:00) (66 - 96)  BP: 89/54 (27 Jan 2023 02:00) (89/54 - 144/82)  BP(mean): 80 (26 Jan 2023 11:15) (70 - 94)  RR: 17 (27 Jan 2023 02:00) (13 - 22)  SpO2: 100% (27 Jan 2023 02:00) (94% - 100%)  O2 Parameters below as of 27 Jan 2023 02:00  Patient On (Oxygen Delivery Method): room air    58 yo benagli F with PMH pancreatic CA (mets to L spine, L femur; s/p chemo with abraxane/gemzar 12/27, radiation ?12/15; s/p Whipple in 2017), Type 2 DM, GERD, asthma, and HTN p/w LLE pain x 1mo with difficulty ambulating x1wk and found to have left femoral neck fracture. Patient is s/p left hip hemiarthroplasty on 1/26/23 , now with symptomatic hypotension.    Hypotension: Will administer Normal saline 500 ml x 1   Will send stat CBC Notified by RN that patient's BP is 89/54. Normal saline 500 ml X1 ordered. Repeat BP noted to be 78/46 ( left upper arm)  and patient c/o blurry vision and feeling weak. Recommended to repeat BP on the opposite arm which noted to be 90/46.   Patient seen and assessed at the bedside. Patient is Czech speaking. Daughter present at the bedside. Interpretation done with the help of daughter. Patient reports  feeling weak and blurry vision. Patient endorses  that she gets blurry vision when her BP is low which is not new . Denies chest pain , dizziness , nausea , abdominal pain , fever and shortness of breath. On exam,  Speech is clear and patient is able to follow commands. No facial droop noted. No focal neurological deficits noted. Dressing on left hip clean, dry and intact.     T(C): 36.7 (27 Jan 2023 02:00), Max: 37.1 (26 Jan 2023 07:10)  T(F): 98.1 (27 Jan 2023 02:00), Max: 98.8 (26 Jan 2023 07:10)  HR: 70 (27 Jan 2023 02:00) (66 - 96)  BP: 89/54 (27 Jan 2023 02:00) (89/54 - 144/82)  BP(mean): 80 (26 Jan 2023 11:15) (70 - 94)  RR: 17 (27 Jan 2023 02:00) (13 - 22)  SpO2: 100% (27 Jan 2023 02:00) (94% - 100%)  O2 Parameters below as of 27 Jan 2023 02:00  Patient On (Oxygen Delivery Method): room air    58 yo Benagli F with PMH pancreatic CA (mets to L spine, L femur; s/p chemo with abraxane/gemzar 12/27, radiation ?12/15; s/p Whipple in 2017), Type 2 DM, GERD, asthma, and HTN p/w LLE pain x 1mo with difficulty ambulating x1wk and found to have left femoral neck fracture. Patient is s/p left hip hemiarthroplasty on 1/26/23 , now with symptomatic hypotension.    Hypotension: Will administer Normal saline 500 ml x 1   Will send stat CBC Notified by RN that patient's BP is 89/54. Normal saline 500 ml X1 ordered. Repeat BP noted to be 78/46 ( left upper arm)  and patient c/o blurry vision and feeling weak. Recommended to repeat BP on the opposite arm which noted to be 90/46.   Patient seen and assessed at the bedside. Patient is Armenian speaking. Daughter present at the bedside. Interpretation done with the help of daughter. Patient reports  feeling weak and blurry vision. Patient endorses  that she gets blurry vision when her BP is low which is not new . Denies chest pain , dizziness , nausea , abdominal pain , fever and shortness of breath. On exam,  Speech is clear and patient is able to follow commands. No facial droop noted. No focal neurological deficits noted. Dressing on left hip clean, dry and intact.     T(C): 36.7 (27 Jan 2023 02:00), Max: 37.1 (26 Jan 2023 07:10)  T(F): 98.1 (27 Jan 2023 02:00), Max: 98.8 (26 Jan 2023 07:10)  HR: 70 (27 Jan 2023 02:00) (66 - 96)  BP: 89/54 (27 Jan 2023 02:00) (89/54 - 144/82)  BP(mean): 80 (26 Jan 2023 11:15) (70 - 94)  RR: 17 (27 Jan 2023 02:00) (13 - 22)  SpO2: 100% (27 Jan 2023 02:00) (94% - 100%)  O2 Parameters below as of 27 Jan 2023 02:00  Patient On (Oxygen Delivery Method): room air    58 yo Benagli F with PMH pancreatic CA (mets to L spine, L femur; s/p chemo with abraxane/gemzar 12/27, radiation ?12/15; s/p Whipple in 2017), Type 2 DM, GERD, asthma, and HTN p/w LLE pain x 1mo with difficulty ambulating x1wk and found to have left femoral neck fracture. Patient is s/p left hip hemiarthroplasty on 1/26/23 , now with symptomatic hypotension.    Hypotension: Will administer Normal saline 500 ml x 1   Will send stat CBC    Addendum: Repeat BP noted to be 98/50. Will start patient on IV fluid NS at 75 ml/ hour  for 12 hours.   H & H noted to be 7/21.8 dropped from 11/ 34.2. Unsure if specimen was collected from the same arm fluid was infusing. Recommended RN to repeat CBC. Notified by RN that patient's BP is 89/54. Normal saline 500 ml X1 ordered. Repeat BP noted to be 78/46 ( left upper arm)  and patient c/o blurry vision and feeling weak. Recommended to repeat BP on the opposite arm which noted to be 90/46.   Patient seen and assessed at the bedside. Patient is Greek speaking. Daughter present at the bedside. Interpretation done with the help of daughter. Patient reports  feeling weak and blurry vision. Patient endorses  that she gets blurry vision when her BP is low which is not new . Denies chest pain , dizziness , nausea , abdominal pain , fever and shortness of breath. On exam,  Speech is clear and patient is able to follow commands. No facial droop noted. No focal neurological deficits noted. Dressing on left hip clean, dry and intact.     T(C): 36.7 (27 Jan 2023 02:00), Max: 37.1 (26 Jan 2023 07:10)  T(F): 98.1 (27 Jan 2023 02:00), Max: 98.8 (26 Jan 2023 07:10)  HR: 70 (27 Jan 2023 02:00) (66 - 96)  BP: 89/54 (27 Jan 2023 02:00) (89/54 - 144/82)  BP(mean): 80 (26 Jan 2023 11:15) (70 - 94)  RR: 17 (27 Jan 2023 02:00) (13 - 22)  SpO2: 100% (27 Jan 2023 02:00) (94% - 100%)  O2 Parameters below as of 27 Jan 2023 02:00  Patient On (Oxygen Delivery Method): room air    58 yo Benagli F with PMH pancreatic CA (mets to L spine, L femur; s/p chemo with abraxane/gemzar 12/27, radiation ?12/15; s/p Whipple in 2017), Type 2 DM, GERD, asthma, and HTN p/w LLE pain x 1mo with difficulty ambulating x1wk and found to have left femoral neck fracture. Patient is s/p left hip hemiarthroplasty on 1/26/23 , now with symptomatic hypotension.    Hypotension: Will administer Normal saline 500 ml x 1   Will send stat CBC    Addendum: Repeat BP noted to be 98/50. Will start patient on IV fluid NS at 75 ml/ hour  for 12 hours.   H & H noted to be 7/21.8 dropped from 11/ 34.2. Unsure if specimen was collected from the same arm fluid was infusing. Recommended RN to repeat CBC.    0640: Repeat H & H noted to be 6.6/ 20.4. Platelet noted to be trending down. Patient seen and assessed at the bedside. Patient is awake and responsive and no active source of bleeding noted. 2 units PRBC ordered . Lovenox discontinued . Case discussed with hospitalist who recommended to do CT left hip , CT abdomen and pelvis with IV constat. MICU consult called. Will send PT/ INR and fibrinogen assay. Case discussed with Orthopedic resident who recommended to transfuse 2 units PRBC and increase IV fluid rate to 125 ml/ hour. Sign out given to day provider.

## 2023-01-27 NOTE — CONSULT NOTE ADULT - ASSESSMENT
57 yo F with PMH pancreatic CA (mets to L spine, L femur; s/p chemo with abraxane/gemzar 12/27, radiation ?12/15; s/p Whipple in 2017), Type 2 DM, GERD, asthma, and HTN now s/p left hip fracture surgery yesterday. MICU consulted for new onset anemia with hypotension.     #Anemia w/ hypotension  - upon initial evaluation, patient with left hip pain, tender to touch w/ localized swelling concerning for possible hemarthrosis / hematoma  - POD-1 from surgery for left hemiarthroplasty  - currently not on AC or antiplatelet agents  - hemoglobin trend 11 --> 7 --> 6, etiology likely secondary to internal hemorrhage   - currently hemodynamically stable, MAP 70 prior to blood transfusion initiation  - maintain active type and screen, two large bore IV's  - CT A/P and bilateral hips STAT  - pain control as needed  - agree with resuscitation for now, however ultimately well need urgent imaging and possible re-evaluation by orthopedic surgery  - currently not a MICU candidate please reconsult as needed     note not finalized until signed by attending

## 2023-01-27 NOTE — PHYSICAL THERAPY INITIAL EVALUATION ADULT - NSPTDISCHREC_GEN_A_CORE
Anticipate discharge to [facility] rehabilitation facility
To be determined pending functional mobility assessment
Rehab facility

## 2023-01-27 NOTE — CONSULT NOTE ADULT - SUBJECTIVE AND OBJECTIVE BOX
HPI: 59 yo Sylheti-speaking F with PMH pancreatic CA (mets to L spine, L femur; s/p chemo with abraxane/gemzar 12/27, radiation ?12/15; s/p Whipple in 2017), Type 2 DM, GERD, asthma, and HTN p/w LLE pain x 1mo with difficulty ambulating x1wk now s/p left hip fracture surgery yesterday. MICU consulted for new onset anemia with hypotension.     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      H/O Whipple procedure  1/11/2019      Hemorrhage of gastroduodenal artery  s/p surgery 1/18/19      H/O drainage of abscess  and abdominal wall repair 1/21/19      H/O eye surgery      Admission for chemotherapy  right chest wall port          FAMILY HISTORY:  Family history of diabetes mellitus (DM) (Sibling)    FH: HTN (hypertension) (Sibling)        Allergies    No Known Allergies    Intolerances        HOME MEDICATIONS:    REVIEW OF SYSTEMS:  CONSTITUTIONAL: No weakness, fevers or chills, endorsing pain in left hip  EYES/ENT: No visual changes;  No vertigo or throat pain   NECK: No pain or stiffness  RESPIRATORY: No cough, wheezing, hemoptysis; No shortness of breath  CARDIOVASCULAR: No chest pain or palpitations  GASTROINTESTINAL: No abdominal or epigastric pain. No nausea, vomiting, or hematemesis; No diarrhea or constipation. No melena or hematochezia.  GENITOURINARY: No dysuria, frequency or hematuria  NEUROLOGICAL: No flaccid paralysis  SKIN: No itching, rashes  MSK: left hip pain  PSYCH: no SI/HI   [ ] Unable to assess ROS because ________    OBJECTIVE:  ICU Vital Signs Last 24 Hrs  T(C): 36.9 (27 Jan 2023 05:58), Max: 37.1 (26 Jan 2023 07:10)  T(F): 98.4 (27 Jan 2023 05:58), Max: 98.8 (26 Jan 2023 07:10)  HR: 67 (27 Jan 2023 05:58) (67 - 96)  BP: 90/49 (27 Jan 2023 05:58) (78/40 - 144/82)  BP(mean): 80 (26 Jan 2023 11:15) (70 - 94)  ABP: --  ABP(mean): --  RR: 17 (27 Jan 2023 05:58) (13 - 22)  SpO2: 100% (27 Jan 2023 05:58) (94% - 100%)    O2 Parameters below as of 27 Jan 2023 05:58  Patient On (Oxygen Delivery Method): room air              01-26 @ 07:01  -  01-27 @ 07:00  --------------------------------------------------------  IN: 150 mL / OUT: 0 mL / NET: 150 mL      CAPILLARY BLOOD GLUCOSE      POCT Blood Glucose.: 227 mg/dL (27 Jan 2023 03:36)      PHYSICAL EXAM:  GENERAL: uncomfortable appearing in bed  HEAD:  Atraumatic, Normocephalic  EYES: EOMI, PERRLA, conjunctival pallor   ENT: Moist mucous membranes  NECK: Supple, No JVD  CHEST/LUNG: Clear to auscultation bilaterally; No rales, rhonchi, wheezing, or rubs. Unlabored respirations  HEART: Regular rate and rhythm; No murmurs, rubs, or gallops  ABDOMEN: Bowel sounds present; Soft, Nontender, Nondistended. No hepatomegaly, no peritoneal signs  EXTREMITIES:  2+ Peripheral Pulses, brisk capillary refill. No clubbing, cyanosis, or edema  NERVOUS SYSTEM:  Alert & Oriented X3, speech clear. No deficits   MSK: lower extremities positioned in abduction pillows, left hip to touch, more swollen compared to right 2/2 to surgery, no sensory deficits   SKIN: No rashes or lesions    HOSPITAL MEDICATIONS:  MEDICATIONS  (STANDING):  acetaminophen     Tablet .. 975 milliGRAM(s) Oral every 8 hours  bisacodyl 5 milliGRAM(s) Oral at bedtime  chlorhexidine 2% Cloths 1 Application(s) Topical daily  cholecalciferol 400 Unit(s) Oral daily  dextrose 5%. 1000 milliLiter(s) (50 mL/Hr) IV Continuous <Continuous>  dextrose 5%. 1000 milliLiter(s) (100 mL/Hr) IV Continuous <Continuous>  dextrose 50% Injectable 25 Gram(s) IV Push once  dextrose 50% Injectable 12.5 Gram(s) IV Push once  dextrose 50% Injectable 25 Gram(s) IV Push once  gabapentin 300 milliGRAM(s) Oral three times a day  glucagon  Injectable 1 milliGRAM(s) IntraMuscular once  influenza   Vaccine 0.5 milliLiter(s) IntraMuscular once  insulin glargine Injectable (LANTUS) 4 Unit(s) SubCutaneous at bedtime  insulin lispro (ADMELOG) corrective regimen sliding scale   SubCutaneous at bedtime  insulin lispro (ADMELOG) corrective regimen sliding scale   SubCutaneous three times a day before meals  insulin lispro Injectable (ADMELOG) 1 Unit(s) SubCutaneous three times a day before meals  lidocaine   4% Patch 1 Patch Transdermal daily  melatonin 3 milliGRAM(s) Oral at bedtime  methadone    Tablet 10 milliGRAM(s) Oral every 12 hours  montelukast 10 milliGRAM(s) Oral at bedtime  multivitamin 1 Tablet(s) Oral daily  ondansetron   Disintegrating Tablet 4 milliGRAM(s) Oral every 8 hours  oxyCODONE    IR 10 milliGRAM(s) Oral every 6 hours  pantoprazole    Tablet 40 milliGRAM(s) Oral before breakfast  polyethylene glycol 3350 17 Gram(s) Oral two times a day  povidone iodine 5% Nasal Swab 1 Application(s) Both Nostrils once  senna 2 Tablet(s) Oral at bedtime  sodium chloride 0.9%. 1000 milliLiter(s) (125 mL/Hr) IV Continuous <Continuous>    MEDICATIONS  (PRN):  dextrose Oral Gel 15 Gram(s) Oral once PRN Blood Glucose LESS THAN 70 milliGRAM(s)/deciliter  meclizine 12.5 milliGRAM(s) Oral every 6 hours PRN Dizziness  naloxone 1 mG/mL Injection for Intranasal Use 0.4 milliGRAM(s) IntraNasal every 3 minutes PRN overdose  oxyCODONE    IR 15 milliGRAM(s) Oral every 3 hours PRN Moderate Pain (4 - 6)  oxyCODONE    IR 20 milliGRAM(s) Oral every 3 hours PRN Severe Pain (7 - 10)  prochlorperazine   Injectable 5 milliGRAM(s) IV Push every 8 hours PRN refractory nausea      LABS:                        6.6    5.46  )-----------( 86       ( 27 Jan 2023 05:16 )             20.4     01-27    131<L>  |  101  |  14  ----------------------------<  199<H>  3.7   |  23  |  0.57    Ca    7.6<L>      27 Jan 2023 03:45  Phos  2.8     01-27  Mg     1.70     01-27      PT/INR - ( 26 Jan 2023 01:46 )   PT: 17.5 sec;   INR: 1.50 ratio         PTT - ( 26 Jan 2023 01:46 )  PTT:28.5 sec          MICROBIOLOGY:     RADIOLOGY:  [ ] Reviewed and interpreted by me    EKG:

## 2023-01-27 NOTE — PROGRESS NOTE ADULT - PROBLEM SELECTOR PLAN 8
DVT ppx: Lovenox  PT recommending [facility], however pt wants to go home  code: full code  Updated pt's daughter at bedside on 1/27, all questions answered

## 2023-01-27 NOTE — PROGRESS NOTE ADULT - PROBLEM SELECTOR PLAN 3
- s/p 5 fractions of RT - completed on 1/24  - Discussed with patient, , and daughter Olegario about considering PCA pump for demand doses as patient with frequent pain. Attempted to complete teach-back method with patient on how to use PCA pump. Patient was unable to demonstrate teach back and shared that she feels like her head is confused at times and does not think she will remember how to use the pump if started.   Discussed that will hold off on pump for above reasons.   - PO Methadone 10mg BID (QTC on 1/23 - 416) (dose increased on 1/23)   - Decrease to PO Oxy IR 10mg q6 ATC; patient may refuse doses   - IV Decadron 4mg BID   - PO Gabapentin 300mg TID  - PO Oxy IR 15mg q3 PRN moderate pain (hold for hypotension, oversedation, respiratory depression)  - PO Oxy IR 20mg q3 PRN severe pain (hold for hypotension, oversedation, respiratory depression)  - Bowel regimen while on opiates  - Narcan PRN - s/p 5 fractions of RT - completed on 1/24  - PO Methadone 10mg BID (QTC on 1/23 - 416) (dose increased on 1/23)   - Decrease to PO Oxy IR 5mg q6 ATC (patient may refuse doses) as dizziness improving with decreased dose of oxycodone  - PO Gabapentin 300mg TID  - Decrease to PO Oxy IR 10mg q3 PRN moderate pain (hold for hypotension, oversedation, respiratory depression)  - Decrease to PO Oxy IR 15mg q3 PRN severe pain (hold for hypotension, oversedation, respiratory depression)  - Bowel regimen while on opiates  - Narcan PRN

## 2023-01-27 NOTE — CONSULT NOTE ADULT - ATTENDING COMMENTS
Ms. Lagunas is a 57 yo F w/ PMH of pancreatic cancer with mets to spine and femur sp whipple in 2017, sp chemotherapy and radiation, DM II, asthma, and HTN who initially presented with LLE pain. Now sp L hip fracture surgery one day prior. This morning she had new onset hypotension with new anemia Hb 11->6. She is symptomatic - feeling worsened generalized weakness. On exam she is awake, alert, answering questions appropriately. After IVF bolus - BP improving with MAP of 70. Pt receiving first unit of pRBC. She has tenderness and swelling at surgical site.     Concern for acute blood loss anemia - possibly into hip/L femur at surgical site vs intraabdominal bleed. no other obvious source of blood loss.     - agree with 2 u pRBC   - sp IVF   - orthopedic surgery consulted   - check CT abd/pelivs, Check CT of thigs - focus on L hip   - monitor BP keep >65   - currently awake, alert protecting airway   - remain on floor pending CT scan results - if any active bleeding/hematoma consult surgery Ms. Lagunas is a 59 yo F w/ PMH of pancreatic cancer with mets to spine and femur sp whipple in 2017, sp chemotherapy and radiation, DM II, asthma, and HTN who initially presented with LLE pain. Now sp L hip fracture surgery one day prior. This morning she had new onset hypotension with new anemia Hb 11->6. She is symptomatic - feeling worsened generalized weakness. On exam she is awake, alert, answering questions appropriately. After IVF bolus - BP improving with MAP of 70. Pt receiving first unit of pRBC. She has tenderness and swelling at surgical site.     Concern for acute blood loss anemia - possibly into hip/L femur at surgical site vs intraabdominal bleed. no other obvious source of blood loss.     - agree with 2 u pRBC   - sp IVF   - orthopedic surgery consulted   - check CT abd/pelivs, Check CT of thigs - focus on L hip   - monitor BP keep >65   - currently awake, alert protecting airway   - remain on floor pending CT scan results - if any active bleeding/hematoma consult surgery  - if any acute worsening of hypotension or vasu bleeding witnessed please call MICU

## 2023-01-27 NOTE — PHYSICAL THERAPY INITIAL EVALUATION ADULT - ASR WT BEARING STATUS EVAL
However, PT to maintain NWB left LE at this time. Per documentation, patient at risk for pathologic fracture./no weight-bearing restrictions
Left LE
Left LE

## 2023-01-27 NOTE — PROVIDER CONTACT NOTE (OTHER) - DATE AND TIME:
17-Jan-2023 16:45
10-Jameel-2023 21:20
27-Jan-2023 02:45
09-Jan-2023 20:40
04-Jan-2023 18:53
15-Jameel-2023 21:50
11-Jan-2023 05:30

## 2023-01-27 NOTE — OCCUPATIONAL THERAPY INITIAL EVALUATION ADULT - DIAGNOSIS, OT EVAL
s/p Left Hip Hemiarthroplasty; Decreased sitting balance; Decreased functional mobility; Decreased ADL management

## 2023-01-27 NOTE — PROGRESS NOTE ADULT - PROBLEM SELECTOR PLAN 5
Patient discussed during oncology interdisciplinary rounds   Symptom management recommendations discussed with primary team   Thank you for allowing us to participate in your patient's care. Please page [phone] for any questions/concerns.

## 2023-01-27 NOTE — PROGRESS NOTE ADULT - SUBJECTIVE AND OBJECTIVE BOX
Ortho Progress Note    S: Patient seen and examined. Mentating well. Low BPs to the 90s, H/H down to 6.6 this AM.       O:  Physical Exam:  Gen: Laying in bed, NAD, alert and oriented.   Resp: Unlabored breathing  LLExt:   Dressing c/d/i  EHL/FHL/TA/Sol intact          + SILT DP/SP/MURPHY/Sa/T          +DP, extremity WWP    Vital Signs Last 24 Hrs  T(C): 36.9 (27 Jan 2023 05:58), Max: 37.1 (26 Jan 2023 07:10)  T(F): 98.4 (27 Jan 2023 05:58), Max: 98.8 (26 Jan 2023 07:10)  HR: 67 (27 Jan 2023 05:58) (67 - 96)  BP: 90/49 (27 Jan 2023 05:58) (78/40 - 144/82)  BP(mean): 80 (26 Jan 2023 11:15) (70 - 94)  RR: 17 (27 Jan 2023 05:58) (13 - 22)  SpO2: 100% (27 Jan 2023 05:58) (94% - 100%)    Parameters below as of 27 Jan 2023 05:58  Patient On (Oxygen Delivery Method): room air                              6.6    5.46  )-----------( 86       ( 27 Jan 2023 05:16 )             20.4                         7.0    6.95  )-----------( 87       ( 27 Jan 2023 03:45 )             21.8       01-27    131<L>  |  101  |  14  ----------------------------<  199<H>  3.7   |  23  |  0.57        PT/INR - ( 26 Jan 2023 01:46 )   PT: 17.5 sec;   INR: 1.50 ratio         PTT - ( 26 Jan 2023 01:46 )  PTT:28.5 sec

## 2023-01-27 NOTE — PROGRESS NOTE ADULT - SUBJECTIVE AND OBJECTIVE BOX
Patient is a 58y old  Female who presents with a chief complaint of Difficulty ambulating (27 Jan 2023 12:14)    SUBJECTIVE / OVERNIGHT EVENTS:  Daughter at bedside providing translation. Declines phone .    Overnight, pt w/hypotension and decline in Hgb.    This AM, patient without n/v/d/cp/sob. States pain is improving and she is able to move her leg now.    MEDICATIONS  (STANDING):  acetaminophen     Tablet .. 975 milliGRAM(s) Oral every 8 hours  bisacodyl 5 milliGRAM(s) Oral at bedtime  chlorhexidine 2% Cloths 1 Application(s) Topical daily  cholecalciferol 400 Unit(s) Oral daily  dextrose 5%. 1000 milliLiter(s) (50 mL/Hr) IV Continuous <Continuous>  dextrose 5%. 1000 milliLiter(s) (100 mL/Hr) IV Continuous <Continuous>  dextrose 50% Injectable 25 Gram(s) IV Push once  dextrose 50% Injectable 12.5 Gram(s) IV Push once  dextrose 50% Injectable 25 Gram(s) IV Push once  gabapentin 300 milliGRAM(s) Oral three times a day  glucagon  Injectable 1 milliGRAM(s) IntraMuscular once  influenza   Vaccine 0.5 milliLiter(s) IntraMuscular once  insulin glargine Injectable (LANTUS) 4 Unit(s) SubCutaneous at bedtime  insulin lispro (ADMELOG) corrective regimen sliding scale   SubCutaneous three times a day before meals  insulin lispro (ADMELOG) corrective regimen sliding scale   SubCutaneous at bedtime  insulin lispro Injectable (ADMELOG) 1 Unit(s) SubCutaneous three times a day before meals  lidocaine   4% Patch 1 Patch Transdermal daily  melatonin 3 milliGRAM(s) Oral at bedtime  methadone    Tablet 10 milliGRAM(s) Oral every 12 hours  montelukast 10 milliGRAM(s) Oral at bedtime  multivitamin 1 Tablet(s) Oral daily  ondansetron   Disintegrating Tablet 4 milliGRAM(s) Oral every 8 hours  oxyCODONE    IR 10 milliGRAM(s) Oral every 6 hours  pantoprazole    Tablet 40 milliGRAM(s) Oral before breakfast  polyethylene glycol 3350 17 Gram(s) Oral two times a day  povidone iodine 5% Nasal Swab 1 Application(s) Both Nostrils once  senna 2 Tablet(s) Oral at bedtime  sodium chloride 0.9%. 1000 milliLiter(s) (125 mL/Hr) IV Continuous <Continuous>    MEDICATIONS  (PRN):  dextrose Oral Gel 15 Gram(s) Oral once PRN Blood Glucose LESS THAN 70 milliGRAM(s)/deciliter  meclizine 12.5 milliGRAM(s) Oral every 6 hours PRN Dizziness  naloxone 1 mG/mL Injection for Intranasal Use 0.4 milliGRAM(s) IntraNasal every 3 minutes PRN overdose  oxyCODONE    IR 15 milliGRAM(s) Oral every 3 hours PRN Moderate Pain (4 - 6)  oxyCODONE    IR 20 milliGRAM(s) Oral every 3 hours PRN Severe Pain (7 - 10)  prochlorperazine   Injectable 5 milliGRAM(s) IV Push every 8 hours PRN refractory nausea      PHYSICAL EXAM:  T(C): 36.7 (01-27-23 @ 09:40), Max: 36.9 (01-26-23 @ 14:41)  HR: 81 (01-27-23 @ 09:40) (67 - 84)  BP: 114/62 (01-27-23 @ 09:40) (78/40 - 114/62)  RR: 18 (01-27-23 @ 09:40) (16 - 18)  SpO2: 100% (01-27-23 @ 09:40) (97% - 100%)  I&O's Summary    26 Jan 2023 07:01  -  27 Jan 2023 07:00  --------------------------------------------------------  IN: 150 mL / OUT: 0 mL / NET: 150 mL      GENERAL: NAD, well-developed, lying in bed; daughter at bedside  HEAD:  Atraumatic, Normocephalic, MMM  CHEST/LUNG: No use of accessory muscles, CTAB, breathing non-labored  COR: RR, no mrcg  ABD: Soft, ND/NT, +BS  PSYCH: AAOx3  NEUROLOGY: CN II-XII grossly intact, moving all extremities  SKIN: No rashes or lesions  EXT: wwp, no cce    LABS:  CAPILLARY BLOOD GLUCOSE      POCT Blood Glucose.: 165 mg/dL (27 Jan 2023 12:23)  POCT Blood Glucose.: 108 mg/dL (27 Jan 2023 08:43)  POCT Blood Glucose.: 227 mg/dL (27 Jan 2023 03:36)  POCT Blood Glucose.: 169 mg/dL (26 Jan 2023 23:24)  POCT Blood Glucose.: 157 mg/dL (26 Jan 2023 22:18)  POCT Blood Glucose.: 133 mg/dL (26 Jan 2023 17:54)  POCT Blood Glucose.: 225 mg/dL (26 Jan 2023 13:19)                          6.6    5.46  )-----------( 86       ( 27 Jan 2023 05:16 )             20.4     01-27    131<L>  |  101  |  14  ----------------------------<  199<H>  3.7   |  23  |  0.57    Ca    7.6<L>      27 Jan 2023 03:45  Phos  2.8     01-27  Mg     1.70     01-27      PT/INR - ( 27 Jan 2023 06:52 )   PT: 17.1 sec;   INR: 1.47 ratio         PTT - ( 27 Jan 2023 06:52 )  PTT:24.7 sec            RADIOLOGY & ADDITIONAL TESTS:    Telemetry Personally Reviewed -     Imaging Personally Reviewed -     Imaging Reviewed - < from: CT Abdomen and Pelvis w/ IV Cont (01.27.23 @ 11:24) >  No evidence of active gastrointestinal hemorrhage.  Endometrial thickening up to 1.3 cm (12/75).     < end of copied text >    < from: CT Hip w/ IV Cont, Left (01.27.23 @ 11:23) >  IMPRESSION:    1.  Status post recent left hip arthroplasty with postsurgical changes   including left adductor, vastus lateralis and gluteus nura muscle   hematomas with small locules of gas.  2.  Moderate lateral thigh soft tissue swelling.  3.  Mild right hip arthrosis.  4.  Heterogeneous appearance of the uterus. Correlate with CT the abdomen   pelvis of the same date.    < end of copied text >      Consultant(s) Notes Reviewed -   ortho    Care Discussed with Consultants/Other Providers -

## 2023-01-27 NOTE — PHYSICAL THERAPY INITIAL EVALUATION ADULT - ORIENTATION, REHAB EVAL
oriented to person, place, time and situation
oriented to person, place, time and situation
person/place

## 2023-01-27 NOTE — PHYSICAL THERAPY INITIAL EVALUATION ADULT - GROSSLY INTACT, SENSORY
to touch and pressure on extremities/Grossly Intact
pt denies numbness, light touch sensation grossly intact/Left LE/Right LE/Grossly Intact

## 2023-01-27 NOTE — PROGRESS NOTE ADULT - SUBJECTIVE AND OBJECTIVE BOX
24-Sep-2017 09:41 INTERVAL HPI/OVERNIGHT EVENTS:  Patient is s/p left hip hemiarthroplasty    MICU consulted overnight due to hypotension and new onset anemia.   In past 24 hours, received 1 prn doses of PO Oxy 20mg     SUBJECTIVE AND OBJECTIVE:  Patient declined Icelandic ; preferred for daughter at bedside to translate.   Patient with improvement of pain; also dizziness improving a bit with decreased dose of ATC oxycodone dose.  Denies nausea.     Allergies    No Known Allergies    Intolerances      MEDICATIONS  (STANDING):  acetaminophen     Tablet .. 975 milliGRAM(s) Oral every 8 hours  bisacodyl 5 milliGRAM(s) Oral at bedtime  chlorhexidine 2% Cloths 1 Application(s) Topical daily  cholecalciferol 400 Unit(s) Oral daily  dextrose 5%. 1000 milliLiter(s) (100 mL/Hr) IV Continuous <Continuous>  dextrose 5%. 1000 milliLiter(s) (50 mL/Hr) IV Continuous <Continuous>  dextrose 50% Injectable 25 Gram(s) IV Push once  dextrose 50% Injectable 12.5 Gram(s) IV Push once  dextrose 50% Injectable 25 Gram(s) IV Push once  gabapentin 300 milliGRAM(s) Oral three times a day  glucagon  Injectable 1 milliGRAM(s) IntraMuscular once  influenza   Vaccine 0.5 milliLiter(s) IntraMuscular once  insulin glargine Injectable (LANTUS) 4 Unit(s) SubCutaneous at bedtime  insulin lispro (ADMELOG) corrective regimen sliding scale   SubCutaneous three times a day before meals  insulin lispro (ADMELOG) corrective regimen sliding scale   SubCutaneous at bedtime  insulin lispro Injectable (ADMELOG) 1 Unit(s) SubCutaneous three times a day before meals  lidocaine   4% Patch 1 Patch Transdermal daily  melatonin 3 milliGRAM(s) Oral at bedtime  methadone    Tablet 10 milliGRAM(s) Oral every 12 hours  montelukast 10 milliGRAM(s) Oral at bedtime  multivitamin 1 Tablet(s) Oral daily  ondansetron   Disintegrating Tablet 4 milliGRAM(s) Oral every 8 hours  oxyCODONE    IR 10 milliGRAM(s) Oral every 6 hours  pantoprazole    Tablet 40 milliGRAM(s) Oral before breakfast  polyethylene glycol 3350 17 Gram(s) Oral two times a day  povidone iodine 5% Nasal Swab 1 Application(s) Both Nostrils once  senna 2 Tablet(s) Oral at bedtime  sodium chloride 0.9%. 1000 milliLiter(s) (125 mL/Hr) IV Continuous <Continuous>    MEDICATIONS  (PRN):  dextrose Oral Gel 15 Gram(s) Oral once PRN Blood Glucose LESS THAN 70 milliGRAM(s)/deciliter  meclizine 12.5 milliGRAM(s) Oral every 6 hours PRN Dizziness  naloxone 1 mG/mL Injection for Intranasal Use 0.4 milliGRAM(s) IntraNasal every 3 minutes PRN overdose  oxyCODONE    IR 15 milliGRAM(s) Oral every 3 hours PRN Moderate Pain (4 - 6)  oxyCODONE    IR 20 milliGRAM(s) Oral every 3 hours PRN Severe Pain (7 - 10)  prochlorperazine   Injectable 5 milliGRAM(s) IV Push every 8 hours PRN refractory nausea      ITEMS UNCHECKED ARE NOT PRESENT    PRESENT SYMPTOMS: [ ]Unable to self-report due to altered mental status- see [ ] CPOT [ ] PAINADS [ ] RDOS  Source if other than patient:  [ ]Family   [ ]Team     Pain: [ x]yes [ ]no  QOL impact - moderate  Location - left thigh                     Aggravating factors - movement   Quality - tingling   Radiation - left lower extremity and lower back  Timing- constant with intermittent worsening episodes   Severity (0-10 scale): 10  Minimal acceptable level / Pain Goal (0-10 scale):  7-8    Dyspnea:                           [ ]Mild [ ]Moderate [ ]Severe  Anxiety:                             [ ]Mild [ ]Moderate [ ]Severe  Agitation:                          [ ]Mild [ ]Moderate [ ]Severe  Fatigue:                             [ ]Mild [ ]Moderate [ ]Severe  Nausea:                             [ ]Mild [ ]Moderate [ ]Severe  Loss of appetite:              [ ]Mild [ ]Moderate [ ]Severe  Constipation:                   [ ]Mild [ ]Moderate [ ]Severe  Diarrhea:                          [ ]Mild [ ]Moderate [ ]Severe    CPOT:    https://www.[facility].org/getattachment/[email or web address]-[email or web address]/Critical-Care-Pain-Observation-Tool-(CPOT)    PCSSQ[Palliative Care Spiritual Screening Question]   Severity (0-10):  Score of 4 or > indicate consideration of Chaplaincy referral.  Chaplaincy Referral: [ ] yes [ ] refused [ ] following [x ] deferred    Caregiver [place]? : [ ] yes [ ] no [ ] Declined [x ] Deferred              Social work referral [ ] Patient & Family Centered Care Referral [ ]     Anticipatory Grief present?:  [x ] yes [ ] no  [ ] Deferred                  Social work referral [x ] Chaplaincy Referral[ ]    Other Symptoms:  [ x]All other review of systems negative     PHYSICAL EXAM:  Vital Signs Last 24 Hrs  T(C): 36.7 (27 Jan 2023 09:40), Max: 36.9 (26 Jan 2023 14:41)  T(F): 98 (27 Jan 2023 09:40), Max: 98.5 (26 Jan 2023 14:41)  HR: 81 (27 Jan 2023 09:40) (67 - 84)  BP: 114/62 (27 Jan 2023 09:40) (78/40 - 114/62)  BP(mean): --  RR: 18 (27 Jan 2023 09:40) (16 - 18)  SpO2: 100% (27 Jan 2023 09:40) (97% - 100%)    Parameters below as of 27 Jan 2023 09:40  Patient On (Oxygen Delivery Method): room air      GENERAL:  [x ]Alert  [x ]Oriented x 3  [ ]Lethargic  [ ]Cachexia  [ ]Unarousable  [x ]Verbal  [ ]Non-Verbal    Behavioral:   [ ] Anxiety  [ ] Delirium [ ] Agitation [ x] Calm  [ ] Other  HEENT:  [ x]Normal  [ ] Temporal Wasting  [ ]Dry mouth   [ ]ET Tube/Trach  [ ]Oral lesions  [ ] Mucositis  PULMONARY:   [ x]Clear [ ]Tachypnea  [ ]Audible excessive secretions   [ ]Rhonchi        [ ]Right [ ]Left [ ]Bilateral  [ ]Crackles        [ ]Right [ ]Left [ ]Bilateral  [ ]Wheezing     [ ]Right [ ]Left [ ]Bilateral  [ ]Diminished breath sounds [ ]right [ ]left [ ]bilateral  CARDIOVASCULAR:    [ x]Regular [ ]Irregular [ ]Tachy  [ ]Dmitry [ ]Murmur [ ]Other  GASTROINTESTINAL:  [ x]Soft  [ ]Distended   [ ]+BS  [x ]Non tender [ ]Tender  [ ]PEG [ ]OGT/ NGT  Last BM: 1/26  GENITOURINARY:   [x ]Normal [ ] Incontinent   [ ]Oliguria/Anuria   [ ][place]  MUSCULOSKELETAL:   [ ]Normal   [x ]Weakness; left lower extremity movement limited due to pain and s/p left hip surgery  [ ]Bed/Wheelchair bound [ ]Edema  [  ] amputation  [  ] contraction  NEUROLOGIC:   [ x]No focal deficits  [ ]Cognitive impairment  [ ]Dysphagia [ ]Dysarthria [ ]Paresis [ ]Other   SKIN: See Nursing Skin Assessment for further details  [x ]Normal    [ ]Rash  [ ]Pressure ulcer(s)       Present on admission [ ]y [ ]n   [  ]  Wound    [  ] hyperpigmentation      CRITICAL CARE:  [ ]Shock Present  [ ]Septic [ ]Cardiogenic [ ]Neurologic [ ]Hypovolemic  [ ]Vasopressors [ ]Inotropes  [ ]Respiratory failure present [ ]Mechanical Ventilation [ ]Non-invasive ventilatory support [ ]High-Flow   [ ]Acute  [ ]Chronic [ ]Hypoxic  [ ]Hypercarbic [ ]Other  [ ]Other organ failure     LABS:                                            6.6    5.46  )-----------( 86       ( 27 Jan 2023 05:16 )             20.4       01-27    131<L>  |  101  |  14  ----------------------------<  199<H>  3.7   |  23  |  0.57    Ca    7.6<L>      27 Jan 2023 03:45  Phos  2.8     01-27  Mg     1.70     01-27        PT/INR - ( 27 Jan 2023 06:52 )   PT: 17.1 sec;   INR: 1.47 ratio         PTT - ( 27 Jan 2023 06:52 )  PTT:24.7 sec    RADIOLOGY & ADDITIONAL STUDIES: reviewed     Protein Calorie Malnutrition Present: [ ]mild [ ]moderate [ ]severe [ ]underweight [ ]morbid obesity  https://www.andeal.org/vault/2440/web/files/ONC/Table_Clinical%20Characteristics%20to%20Document%20Malnutrition-White%20JV%20et%20al%202012.pdf    Height (cm): 149 (12-29-22 @ 22:41), 149 (12-27-22 @ 16:21), 147.3 (10-22-22 @ 08:57)  Weight (kg): 43.5 (12-27-22 @ 16:21), 47 (10-22-22 @ 08:57), 45.9 (07-20-22 @ 11:51)  BMI (kg/m2): 19.6 (12-29-22 @ 22:41), 19.6 (12-27-22 @ 16:21), 21.7 (10-22-22 @ 08:57)    [ ]PPSV2 < or = 30%  [ ]significant weight loss [ ]poor nutritional intake [ ]anasarca   [ ]Artificial Nutrition    REFERRALS:   [ ]Chaplaincy  [ ]Hospice  [ ]Child Life  [ ]Social Work  [ x]Case management [ ]Holistic Therapy

## 2023-01-27 NOTE — PHYSICAL THERAPY INITIAL EVALUATION ADULT - PERTINENT HX OF CURRENT PROBLEM, REHAB EVAL
This is a 56 y/o F, with pancreatic cancer w/ known metastasis to lung, lumbar spine - posterior elements of L1-5, and femur, p/w LBP radiating to LLE.for approximatel one month, Bone scan demonstrated lesions L femoral neck and lumbar spine. XR showed lucency on the L femoral neck. Course c/b severe pain, N/V, palliative following for pain recs. Ortho deferring surgical management of L femur until radiation completed.
Pt. admitted for difficulty ambulating, left LE pain. Per documentation, x-rays left knee/hip/pelvis/femur negative for acute fracture. Nuclear bone scan revealed abnormal foci in the left femoral neck and lumbar spine highly suspicious for osseous metastases. Osseous lesion in the left femoral neck places the patient at risk for pathologic fracture.
58 year old female s/p hemiarthroplasty, left hip

## 2023-01-27 NOTE — PHYSICAL THERAPY INITIAL EVALUATION ADULT - FOLLOWS COMMANDS/ANSWERS QUESTIONS, REHAB EVAL
100% of the time
able to follow single-step instructions
100% of the time/able to follow multistep instructions

## 2023-01-27 NOTE — PROGRESS NOTE ADULT - ASSESSMENT
A/P: 57F with L pathologic FN Fx s/p Hemiarthroplasty 1/26    Please give 2U and transfuse for HgB > 8 as patient is s/p hemiarthroplasty  Also please keep patient on high rate fluids (125) as patient is s/p hemiarthroplasty  Neuro: Pain control  Resp: IS  GI: Regular diet, bowel reg  MSK: WBAT, PT/OT, posterior precautions, abduction pillow  Heme: DVT PPX: Lovenox (holding in setting of drop in H/H), venodynes   Appreciate rad-oncs recs  Appreciate medical recs

## 2023-01-27 NOTE — OCCUPATIONAL THERAPY INITIAL EVALUATION ADULT - MD ORDER
Occupational Therapy (OT) to evaluate and treat. Out of Bed to Chair. Per LINDA Adams, pt is okay to participate in OT evaluation and perform activity as tolerated.

## 2023-01-27 NOTE — OCCUPATIONAL THERAPY INITIAL EVALUATION ADULT - PERTINENT HX OF CURRENT PROBLEM, REHAB EVAL
Pt is a 57 year old female with hx of pancreatic cancer (mets to Left spine, Left femur; Cong, chemo ambraxane/gemzar 12/27/22, radiation ?12/15/22), T2D, GERD, asthma, HTN, & recent hospitalization 10/22/22- 11/1/22, for abdominal pain, found to have gastritis and colitis likely in the setting of radiation with course c/b E coli UTI and PNA s/p abx, who presented to [facility] 12/30/22 with Left LE pain x 1 months with difficulty ambulating x1 week. Pt admitted to medicine for further observation and management. Xray of Left Hip on 1/24/23 displayed Displaced pathologic left femoral neck fracture. No dislocations or additional fractures. No additional radiographically appreciated suspicious focal osseous lesions. External urine catch device overlies vulvar region. Pt is now s/p Left Hip Hemiarthroplasty, on 1/26/23.

## 2023-01-27 NOTE — PHYSICAL THERAPY INITIAL EVALUATION ADULT - PATIENT PROFILE REVIEW, REHAB EVAL
Activity - out of bed to chair/yes
Activity - Ambulate with assistance. Spoke with Kelsea ROE RN, pt. ok to be seen for PT Evaluation./yes
ok for PT evaluation for OOB activities per RN Cathy/yes

## 2023-01-27 NOTE — CONSULT NOTE ADULT - CONSULT REQUESTED DATE/TIME
01-Jan-2023
30-Dec-2022 18:27
30-Dec-2022 13:13
30-Dec-2022 19:23
27-Jan-2023 07:02
30-Dec-2022 13:41

## 2023-01-27 NOTE — PHYSICAL THERAPY INITIAL EVALUATION ADULT - ADDITIONAL COMMENTS
Pt. ambulates with straight cane/ rolling walker.     Pt. was left in bed post PT Evaluation, no apparent distress, call bell within reach, daughter present.
Pt lives in a house with family, has a flight of steps to enter. Prior to admission pt reports ambulating with a cane/rolling walker, required assistance for ADLs.    Following evaluation, pt was left semireclined in bed in no distress, all lines in tact, call hanks in reach. LINDA cabrera.

## 2023-01-27 NOTE — PHYSICAL THERAPY INITIAL EVALUATION ADULT - DIAGNOSIS, PT EVAL
deconditioning , weakness, inability to amb
Impairment in strength
Generalized weakness, impaired balance, impaired mobility, decreased endurance.

## 2023-01-27 NOTE — OCCUPATIONAL THERAPY INITIAL EVALUATION ADULT - ADDITIONAL COMMENTS
Prior to hospitalization, pt.'s daughter explains that pt benefited from assistance with ADL's and was using a rolling walker with supervision for functional mobility. Per pt.'s daughter, she is pt.'s HHA through [employer] program, 56 hours/day x 7 days/week.

## 2023-01-27 NOTE — PROVIDER CONTACT NOTE (OTHER) - ASSESSMENT
Patient reporting generalized pain not relieved by PRN oxycodone 10mg
VS stable otherwise, pt reporting lightheadedness which is not new
Labs stable
Pt is A&Ox4, OOB to 1 assist, no acute distress, no nausea or vomiting, asymptomatic for low BP.
Patient complaining of weakness, blurred vision and cramping in the legs which she states is not new. Other VSS, no changes in mental status.
Pt c/o breakthrough pain in LLE 8/10

## 2023-01-27 NOTE — PROGRESS NOTE ADULT - SUBJECTIVE AND OBJECTIVE BOX
INTERVAL HPI/OVERNIGHT EVENTS:  Patient seen at bedside.  Patient resting comfortably in bed  Accompanied by her dtr Anwar at bedside  As per patient dtr pain has only mildly improved   since her surgery yesterday  As per chart, patient with hypotension earlier in  the day  Dtr endorsing she and pt are overwhelmed with hospital admission  "Its one thing after the next"      VITAL SIGNS:  T(F): 98 (01-27-23 @ 09:40)  HR: 81 (01-27-23 @ 09:40)  BP: 114/62 (01-27-23 @ 09:40)  RR: 18 (01-27-23 @ 09:40)  SpO2: 100% (01-27-23 @ 09:40)  Wt(kg): --    PHYSICAL EXAM:  GENERAL: NAD, well-developed, lying in bed; daughter at bedside  HEAD:  Atraumatic, Normocephalic, MMM  CHEST/LUNG: No use of accessory muscles, CTAB, breathing non-labored  COR: RR, no mrcg  ABD: Soft, ND/NT, +BS  PSYCH: AAOx3  NEUROLOGY: CN II-XII grossly intact, moving all extremities  SKIN: No rashes or lesions  EXT: wwp, no cce      MEDICATIONS  (STANDING):  acetaminophen     Tablet .. 975 milliGRAM(s) Oral every 8 hours  bisacodyl 5 milliGRAM(s) Oral at bedtime  chlorhexidine 2% Cloths 1 Application(s) Topical daily  cholecalciferol 400 Unit(s) Oral daily  dextrose 5%. 1000 milliLiter(s) (50 mL/Hr) IV Continuous <Continuous>  dextrose 5%. 1000 milliLiter(s) (100 mL/Hr) IV Continuous <Continuous>  dextrose 50% Injectable 25 Gram(s) IV Push once  dextrose 50% Injectable 12.5 Gram(s) IV Push once  dextrose 50% Injectable 25 Gram(s) IV Push once  gabapentin 300 milliGRAM(s) Oral three times a day  glucagon  Injectable 1 milliGRAM(s) IntraMuscular once  influenza   Vaccine 0.5 milliLiter(s) IntraMuscular once  insulin glargine Injectable (LANTUS) 4 Unit(s) SubCutaneous at bedtime  insulin lispro (ADMELOG) corrective regimen sliding scale   SubCutaneous three times a day before meals  insulin lispro (ADMELOG) corrective regimen sliding scale   SubCutaneous at bedtime  insulin lispro Injectable (ADMELOG) 1 Unit(s) SubCutaneous three times a day before meals  lidocaine   4% Patch 1 Patch Transdermal daily  melatonin 3 milliGRAM(s) Oral at bedtime  methadone    Tablet 10 milliGRAM(s) Oral every 12 hours  montelukast 10 milliGRAM(s) Oral at bedtime  multivitamin 1 Tablet(s) Oral daily  ondansetron   Disintegrating Tablet 4 milliGRAM(s) Oral every 8 hours  oxyCODONE    IR 5 milliGRAM(s) Oral every 6 hours  pantoprazole    Tablet 40 milliGRAM(s) Oral before breakfast  polyethylene glycol 3350 17 Gram(s) Oral two times a day  povidone iodine 5% Nasal Swab 1 Application(s) Both Nostrils once  senna 2 Tablet(s) Oral at bedtime  sodium chloride 0.9%. 1000 milliLiter(s) (125 mL/Hr) IV Continuous <Continuous>    MEDICATIONS  (PRN):  dextrose Oral Gel 15 Gram(s) Oral once PRN Blood Glucose LESS THAN 70 milliGRAM(s)/deciliter  meclizine 12.5 milliGRAM(s) Oral every 6 hours PRN Dizziness  naloxone 1 mG/mL Injection for Intranasal Use 0.4 milliGRAM(s) IntraNasal every 3 minutes PRN overdose  oxyCODONE    IR 10 milliGRAM(s) Oral every 3 hours PRN Moderate Pain (4 - 6)  oxyCODONE    IR 15 milliGRAM(s) Oral every 3 hours PRN Severe Pain (7 - 10)  prochlorperazine   Injectable 5 milliGRAM(s) IV Push every 8 hours PRN refractory nausea      Allergies    No Known Allergies    Intolerances        LABS:                        6.6    5.46  )-----------( 86       ( 27 Jan 2023 05:16 )             20.4     01-27    131<L>  |  101  |  14  ----------------------------<  199<H>  3.7   |  23  |  0.57    Ca    7.6<L>      27 Jan 2023 03:45  Phos  2.8     01-27  Mg     1.70     01-27      PT/INR - ( 27 Jan 2023 06:52 )   PT: 17.1 sec;   INR: 1.47 ratio         PTT - ( 27 Jan 2023 06:52 )  PTT:24.7 sec      RADIOLOGY & ADDITIONAL TESTS:  Studies reviewed.

## 2023-01-27 NOTE — PHYSICAL THERAPY INITIAL EVALUATION ADULT - MANUAL MUSCLE TESTING RESULTS, REHAB EVAL
bilateral UE and LE grossly 3/5 except left hip 3-/5
Bilateral UE, right LE grossly 3+/5 throughout, left hip not formally assessed, grossly 3-/5 throughout left LE
LLE not tested due to pain, RLE 3+/5 UE's 3+/5 throughtout/grossly assessed due to

## 2023-01-27 NOTE — PHYSICAL THERAPY INITIAL EVALUATION ADULT - LIVES WITH, PROFILE
Lives in a house with family. Has 8-10 steps to negotiate.
family
family at home, 8-10 steps to manage

## 2023-01-27 NOTE — PROGRESS NOTE ADULT - ASSESSMENT
58 yo F with metastatic pancreatic CA (mets to L spine, L femur). She was first diagnosed with pancreatic cancer in 2018 and then in 1/2019 underwent a Whipple for Stage III pancreatic cancer, T3N1 with 1/24 LN positive. She did well until 7/2022 when she was found to have recurrent disease and was started on chemo with Abraxane and Gemzar. She tolerated chemotherapy well overall. She is now admitted with LLE pain x 1mo with difficulty ambulating x1wk. CT shows no evidence of metastatic disease at this time. Bone scan and MRI show evidence of progression of cancer with metastatic lesions within the left femoral neck and superior articular facet of L5. The lesion in the left femoral neck likely places the patient at increased risk for pathologic fracture.    Patient s/p  5 fractions of RT to the left femur however with increased left pain  1/25- XR hip with L pathological femoral neck fracture; orthopedics re consulted  1/26 s/p L hemiarthoplasty  1/27 pt with hypotension stat CT left  hip revealing Status post recent left hip arthroplasty with postsurgical changes   including left adductor, vastus lateralis and gluteus nura muscle   hematomas with small locules of gas.  To followup Ortho for further recommendations  -GOC: They are interested in continuing treatment and understand that as she has had POD on the current chemo she should be evaluated for a new systemic therapy following RT.   -She is a good candidate for additional chemotherapy.  -No plans for inpatient chemotherapy  -Appreciate  Palliative Care consult for pain management  -Monitor CBC daily, pt counts may go down in the setting of recent chemo  -PT rec rehab, patient preferring to go home with services  -Patient to followup with her primary oncologist Dr. Katty Navarro ([facility]) upon discharge  -C/w Supportive care, pain control, Nutrition, PT, DVT ppx  -Oncology will continue to follow with you      Case discussed with Dr. Roseanne CASTLE  Oncology Physician Assistant  Althea DICKENS/KAREN [facility]  Pager [phone] also available on Teams    If before 8am/after 5pm or on weekends please page On-call Oncology Fellow

## 2023-01-27 NOTE — PROVIDER CONTACT NOTE (OTHER) - REASON
Morning labs not ordered
Pt c/o breakthrough pain in LLE
New fatigue, and BP 97/57
Pt c/o 10/10 left leg pain s/p PO oxycodone IR 20mg
Hypotension
Patient reporting pain not relieved by oxycodone 10mg
Pt BP at 6 pm is 94/49 and HR 71, Pt is asymptomatic

## 2023-01-27 NOTE — PHYSICAL THERAPY INITIAL EVALUATION ADULT - ACTIVE RANGE OF MOTION EXAMINATION, REHAB EVAL
left ankle active ROM WFL, left hip flexion, knee flexion WFL tested within posterior hip precautions/bilateral upper extremity Active ROM was WFL (within functional limits)/Right LE Active ROM was WFL (within functional limits)
except left hip flexion 0-60 degrees/bilateral upper extremity Active ROM was WFL (within functional limits)/bilateral  lower extremity Active ROM was WFL (within functional limits)

## 2023-01-27 NOTE — PROVIDER CONTACT NOTE (OTHER) - NAME OF MD/NP/PA/DO NOTIFIED:
Maria Eugenia ACP
BHUPINDER CASTLE
Melany Carvalho
Nora Mckeon
Deven Mosley NP
SHAILESH Mosley
Leah Barrios

## 2023-01-27 NOTE — PROGRESS NOTE ADULT - ASSESSMENT
58 yo [place]het[place]-speaking F with PMH pancreatic CA (mets to L spine, L femur; s/p chemo with abraxane/gemzar 12/27, radiation ?12/15; s/p Whipple in 2017), Type 2 DM, GERD, asthma, and HTN p/w LLE pain x 1mo with difficulty ambulating x1wk. Likely in the setting of known metastatic disease. XR showed lucency on the L femoral neck. Course c/b severe pain, N/V, constipation -- palliative following for pain recs. Ortho deferring surgical management of L femur until radiation completed. Rad-onc following, completed 5 sessions of RT. Course c/b hip fracture now s/p L hip hemiarthroplasty. Further complicated by acute blood loss anemia.

## 2023-01-27 NOTE — PROGRESS NOTE ADULT - PROBLEM SELECTOR PLAN 4
- home meds: Januvia 100mg, metformin 500mg BID  - 10/2022 A1C 7.4%, well controlled  - moderate ISS; c/w glargine 4u hs  - monitor FS qac and qHS

## 2023-01-27 NOTE — PROVIDER CONTACT NOTE (OTHER) - SITUATION
Patient reporting pain not relieved by oxycodone 10mg
Provider notified that patient was hypotensive with q4 VS blood pressure electronically 89/54. 500cc bolus given as ordered. Post bolus patient remained hypotensive 78/40 electronic, 90/46 manually.
Pt c/o 10/10 left leg pain s/p PO oxycodone IR 20mg
Pt c/o breakthrough pain in LLE
Pt BP at 6 pm is 94/49 and HR 71, Pt is asymptomatic
Morning labs not ordered
Patient reports new fatigue and BP 97/57

## 2023-01-27 NOTE — PHYSICAL THERAPY INITIAL EVALUATION ADULT - PRECAUTIONS/LIMITATIONS, REHAB EVAL
fracture precautions/fall precautions
fall precautions
posterior/fall precautions/left hip precautions

## 2023-01-27 NOTE — OCCUPATIONAL THERAPY INITIAL EVALUATION ADULT - LIVES WITH, PROFILE
Pt.'s daughter bedside reports pt lives with her in a house with 8-10 steps to enter. Once inside, pt.'s daughter reports bedroom and bathroom are located on the main level. Per pt.'s daughter, she has a bathtub in her bathroom with shower chair and grab bar available.

## 2023-01-27 NOTE — PHYSICAL THERAPY INITIAL EVALUATION ADULT - PLANNED THERAPY INTERVENTIONS, PT EVAL
balance training/bed mobility training/gait training/strengthening/transfer training
balance training/bed mobility training/gait training/strengthening/transfer training
balance training/bed mobility training/gait training/transfer training

## 2023-01-27 NOTE — CHART NOTE - NSCHARTNOTEFT_GEN_A_CORE
58F PMH of pancreatic cancer (mets to L spine, L femur; Cong, chemo ambraxane/gemzar 12/27, radiation ?12/15), T2DM, GERD, asthma, and HTN p/w LLE pain x 1 month with difficulty ambulating x1 week. Likely in the setting of known metastatic disease. Admitted for PT eval and pain control. s/p L Hemiarthroplasty.  Pt can  self-propel. Cannot perform ADL's with the use of a walker, cane or crutched due to debilitation 2/2 surgery and metastatic disease. Wheel chair to be used to move about in the home. 58F PMH of pancreatic cancer (mets to L spine, L femur; Cong, chemo ambraxane/gemzar 12/27, radiation ?12/15), T2DM, GERD, asthma, and HTN p/w LLE pain x 1 month with difficulty ambulating x1 week. Likely in the setting of known metastatic disease. Admitted for PT eval and pain control. s/p L Hemiarthroplasty. Pt can't self-propel. Cannot perform ADL's with the use of a walker, cane or crutched due to debilitation 2/2 surgery and metastatic disease. Caregiver available, and willing to provide assistance with wheel chair to be used to move about in the home.

## 2023-01-27 NOTE — PHYSICAL THERAPY INITIAL EVALUATION ADULT - GENERAL OBSERVATIONS, REHAB EVAL
Pt encountered semireclined in bed in no distress, +IV, daughter at bedside
Consult received, chart reviewed. Patient received in bed, no apparent distress, daughter present.
Pt received seated OOB in a chair , LE elevated , c/o severe pain with dangling of LLE. RN at bedside and gave pain meds to the pt.

## 2023-01-28 LAB
ANION GAP SERPL CALC-SCNC: 7 MMOL/L — SIGNIFICANT CHANGE UP (ref 7–14)
BUN SERPL-MCNC: 8 MG/DL — SIGNIFICANT CHANGE UP (ref 7–23)
CALCIUM SERPL-MCNC: 8.5 MG/DL — SIGNIFICANT CHANGE UP (ref 8.4–10.5)
CHLORIDE SERPL-SCNC: 104 MMOL/L — SIGNIFICANT CHANGE UP (ref 98–107)
CO2 SERPL-SCNC: 25 MMOL/L — SIGNIFICANT CHANGE UP (ref 22–31)
CREAT SERPL-MCNC: 0.44 MG/DL — LOW (ref 0.5–1.3)
EGFR: 113 ML/MIN/1.73M2 — SIGNIFICANT CHANGE UP
GLUCOSE BLDC GLUCOMTR-MCNC: 106 MG/DL — HIGH (ref 70–99)
GLUCOSE BLDC GLUCOMTR-MCNC: 130 MG/DL — HIGH (ref 70–99)
GLUCOSE BLDC GLUCOMTR-MCNC: 149 MG/DL — HIGH (ref 70–99)
GLUCOSE BLDC GLUCOMTR-MCNC: 188 MG/DL — HIGH (ref 70–99)
GLUCOSE SERPL-MCNC: 115 MG/DL — HIGH (ref 70–99)
HCT VFR BLD CALC: 33.7 % — LOW (ref 34.5–45)
HGB BLD-MCNC: 11.1 G/DL — LOW (ref 11.5–15.5)
MAGNESIUM SERPL-MCNC: 1.7 MG/DL — SIGNIFICANT CHANGE UP (ref 1.6–2.6)
MCHC RBC-ENTMCNC: 26.3 PG — LOW (ref 27–34)
MCHC RBC-ENTMCNC: 32.9 GM/DL — SIGNIFICANT CHANGE UP (ref 32–36)
MCV RBC AUTO: 79.9 FL — LOW (ref 80–100)
NRBC # BLD: 0 /100 WBCS — SIGNIFICANT CHANGE UP (ref 0–0)
NRBC # FLD: 0 K/UL — SIGNIFICANT CHANGE UP (ref 0–0)
PHOSPHATE SERPL-MCNC: 2.3 MG/DL — LOW (ref 2.5–4.5)
PLATELET # BLD AUTO: 88 K/UL — LOW (ref 150–400)
POTASSIUM SERPL-MCNC: 3.8 MMOL/L — SIGNIFICANT CHANGE UP (ref 3.5–5.3)
POTASSIUM SERPL-SCNC: 3.8 MMOL/L — SIGNIFICANT CHANGE UP (ref 3.5–5.3)
RBC # BLD: 4.22 M/UL — SIGNIFICANT CHANGE UP (ref 3.8–5.2)
RBC # FLD: 14.6 % — HIGH (ref 10.3–14.5)
SODIUM SERPL-SCNC: 136 MMOL/L — SIGNIFICANT CHANGE UP (ref 135–145)
WBC # BLD: 6.96 K/UL — SIGNIFICANT CHANGE UP (ref 3.8–10.5)
WBC # FLD AUTO: 6.96 K/UL — SIGNIFICANT CHANGE UP (ref 3.8–10.5)

## 2023-01-28 PROCEDURE — 99233 SBSQ HOSP IP/OBS HIGH 50: CPT

## 2023-01-28 RX ADMIN — METHADONE HYDROCHLORIDE 10 MILLIGRAM(S): 40 TABLET ORAL at 18:18

## 2023-01-28 RX ADMIN — OXYCODONE HYDROCHLORIDE 5 MILLIGRAM(S): 5 TABLET ORAL at 23:37

## 2023-01-28 RX ADMIN — OXYCODONE HYDROCHLORIDE 5 MILLIGRAM(S): 5 TABLET ORAL at 00:10

## 2023-01-28 RX ADMIN — OXYCODONE HYDROCHLORIDE 5 MILLIGRAM(S): 5 TABLET ORAL at 18:19

## 2023-01-28 RX ADMIN — Medication 1 UNIT(S): at 18:19

## 2023-01-28 RX ADMIN — GABAPENTIN 300 MILLIGRAM(S): 400 CAPSULE ORAL at 13:14

## 2023-01-28 RX ADMIN — MONTELUKAST 10 MILLIGRAM(S): 4 TABLET, CHEWABLE ORAL at 22:35

## 2023-01-28 RX ADMIN — GABAPENTIN 300 MILLIGRAM(S): 400 CAPSULE ORAL at 22:34

## 2023-01-28 RX ADMIN — Medication 975 MILLIGRAM(S): at 13:48

## 2023-01-28 RX ADMIN — PANTOPRAZOLE SODIUM 40 MILLIGRAM(S): 20 TABLET, DELAYED RELEASE ORAL at 06:11

## 2023-01-28 RX ADMIN — GABAPENTIN 300 MILLIGRAM(S): 400 CAPSULE ORAL at 06:11

## 2023-01-28 RX ADMIN — Medication 2: at 09:46

## 2023-01-28 RX ADMIN — INSULIN GLARGINE 4 UNIT(S): 100 INJECTION, SOLUTION SUBCUTANEOUS at 22:35

## 2023-01-28 RX ADMIN — Medication 975 MILLIGRAM(S): at 13:13

## 2023-01-28 RX ADMIN — OXYCODONE HYDROCHLORIDE 5 MILLIGRAM(S): 5 TABLET ORAL at 06:47

## 2023-01-28 RX ADMIN — Medication 975 MILLIGRAM(S): at 06:12

## 2023-01-28 RX ADMIN — OXYCODONE HYDROCHLORIDE 5 MILLIGRAM(S): 5 TABLET ORAL at 06:10

## 2023-01-28 RX ADMIN — Medication 5 MILLIGRAM(S): at 22:36

## 2023-01-28 RX ADMIN — SODIUM CHLORIDE 75 MILLILITER(S): 9 INJECTION INTRAMUSCULAR; INTRAVENOUS; SUBCUTANEOUS at 06:10

## 2023-01-28 RX ADMIN — ONDANSETRON 4 MILLIGRAM(S): 8 TABLET, FILM COATED ORAL at 13:14

## 2023-01-28 RX ADMIN — Medication 975 MILLIGRAM(S): at 22:34

## 2023-01-28 RX ADMIN — METHADONE HYDROCHLORIDE 10 MILLIGRAM(S): 40 TABLET ORAL at 06:10

## 2023-01-28 RX ADMIN — ONDANSETRON 4 MILLIGRAM(S): 8 TABLET, FILM COATED ORAL at 06:11

## 2023-01-28 RX ADMIN — Medication 400 UNIT(S): at 12:04

## 2023-01-28 RX ADMIN — Medication 1 UNIT(S): at 13:13

## 2023-01-28 RX ADMIN — Medication 1 UNIT(S): at 09:48

## 2023-01-28 RX ADMIN — OXYCODONE HYDROCHLORIDE 5 MILLIGRAM(S): 5 TABLET ORAL at 12:04

## 2023-01-28 RX ADMIN — ONDANSETRON 4 MILLIGRAM(S): 8 TABLET, FILM COATED ORAL at 22:34

## 2023-01-28 RX ADMIN — OXYCODONE HYDROCHLORIDE 5 MILLIGRAM(S): 5 TABLET ORAL at 12:38

## 2023-01-28 RX ADMIN — CHLORHEXIDINE GLUCONATE 1 APPLICATION(S): 213 SOLUTION TOPICAL at 12:05

## 2023-01-28 RX ADMIN — OXYCODONE HYDROCHLORIDE 5 MILLIGRAM(S): 5 TABLET ORAL at 19:46

## 2023-01-28 RX ADMIN — Medication 1 TABLET(S): at 12:04

## 2023-01-28 NOTE — PROGRESS NOTE ADULT - SUBJECTIVE AND OBJECTIVE BOX
Patient is a 58y old  Female who presents with a chief complaint of Difficulty ambulating (28 Jan 2023 01:02)      SUBJECTIVE / OVERNIGHT EVENTS:    MEDICATIONS  (STANDING):  acetaminophen     Tablet .. 975 milliGRAM(s) Oral every 8 hours  bisacodyl 5 milliGRAM(s) Oral at bedtime  chlorhexidine 2% Cloths 1 Application(s) Topical daily  cholecalciferol 400 Unit(s) Oral daily  dextrose 5%. 1000 milliLiter(s) (50 mL/Hr) IV Continuous <Continuous>  dextrose 5%. 1000 milliLiter(s) (100 mL/Hr) IV Continuous <Continuous>  dextrose 50% Injectable 25 Gram(s) IV Push once  dextrose 50% Injectable 12.5 Gram(s) IV Push once  dextrose 50% Injectable 25 Gram(s) IV Push once  gabapentin 300 milliGRAM(s) Oral three times a day  glucagon  Injectable 1 milliGRAM(s) IntraMuscular once  influenza   Vaccine 0.5 milliLiter(s) IntraMuscular once  insulin glargine Injectable (LANTUS) 4 Unit(s) SubCutaneous at bedtime  insulin lispro (ADMELOG) corrective regimen sliding scale   SubCutaneous three times a day before meals  insulin lispro (ADMELOG) corrective regimen sliding scale   SubCutaneous at bedtime  insulin lispro Injectable (ADMELOG) 1 Unit(s) SubCutaneous three times a day before meals  lidocaine   4% Patch 1 Patch Transdermal daily  melatonin 3 milliGRAM(s) Oral at bedtime  methadone    Tablet 10 milliGRAM(s) Oral every 12 hours  montelukast 10 milliGRAM(s) Oral at bedtime  multivitamin 1 Tablet(s) Oral daily  ondansetron   Disintegrating Tablet 4 milliGRAM(s) Oral every 8 hours  oxyCODONE    IR 5 milliGRAM(s) Oral every 6 hours  pantoprazole    Tablet 40 milliGRAM(s) Oral before breakfast  polyethylene glycol 3350 17 Gram(s) Oral two times a day  povidone iodine 5% Nasal Swab 1 Application(s) Both Nostrils once  senna 2 Tablet(s) Oral at bedtime    MEDICATIONS  (PRN):  dextrose Oral Gel 15 Gram(s) Oral once PRN Blood Glucose LESS THAN 70 milliGRAM(s)/deciliter  meclizine 12.5 milliGRAM(s) Oral every 6 hours PRN Dizziness  naloxone 1 mG/mL Injection for Intranasal Use 0.4 milliGRAM(s) IntraNasal every 3 minutes PRN overdose  oxyCODONE    IR 10 milliGRAM(s) Oral every 3 hours PRN Moderate Pain (4 - 6)  oxyCODONE    IR 15 milliGRAM(s) Oral every 3 hours PRN Severe Pain (7 - 10)  prochlorperazine   Injectable 5 milliGRAM(s) IV Push every 8 hours PRN refractory nausea      Vital Signs Last 24 Hrs  T(C): 36.6 (28 Jan 2023 05:35), Max: 36.7 (27 Jan 2023 11:25)  T(F): 97.9 (28 Jan 2023 05:35), Max: 98.1 (27 Jan 2023 11:25)  HR: 81 (28 Jan 2023 05:35) (73 - 81)  BP: 120/66 (28 Jan 2023 05:35) (103/59 - 120/66)  BP(mean): --  RR: 16 (28 Jan 2023 05:35) (16 - 18)  SpO2: 100% (28 Jan 2023 05:35) (100% - 100%)    Parameters below as of 28 Jan 2023 05:35  Patient On (Oxygen Delivery Method): room air      CAPILLARY BLOOD GLUCOSE      POCT Blood Glucose.: 188 mg/dL (28 Jan 2023 08:27)  POCT Blood Glucose.: 107 mg/dL (27 Jan 2023 22:23)  POCT Blood Glucose.: 205 mg/dL (27 Jan 2023 17:48)  POCT Blood Glucose.: 165 mg/dL (27 Jan 2023 12:23)    I&O's Summary      PHYSICAL EXAM:  GENERAL: NAD, well-developed, lying in bed;  HEAD:  Atraumatic, Normocephalic, MMM  CHEST/LUNG: No use of accessory muscles, CTAB, breathing non-labored  COR: RR, no mrcg  ABD: Soft, ND/NT, +BS  PSYCH: AAOx3  NEUROLOGY: CN II-XII grossly intact, moving all extremities  SKIN: No rashes or lesions  EXT: wwp, no cce        LABS:                        11.1   6.96  )-----------( 88       ( 28 Jan 2023 06:00 )             33.7     01-28    136  |  104  |  8   ----------------------------<  115<H>  3.8   |  25  |  0.44<L>    Ca    8.5      28 Jan 2023 06:00  Phos  2.3     01-28  Mg     1.70     01-28      PT/INR - ( 27 Jan 2023 06:52 )   PT: 17.1 sec;   INR: 1.47 ratio         PTT - ( 27 Jan 2023 06:52 )  PTT:24.7 sec          RADIOLOGY & ADDITIONAL TESTS:    Imaging Personally Reviewed:    Consultant(s) Notes Reviewed:      Care Discussed with Consultants/Other Providers:   Patient is a 58y old  Female who presents with a chief complaint of Difficulty ambulating (28 Jan 2023 01:02)      SUBJECTIVE / OVERNIGHT EVENTS: patient seen and examined by bedside, pt reports pain in left hip, denies headache, dizziness, SOB, CP, Palpitations , N/V/D, abdominal pain  daughter at bedside     MEDICATIONS  (STANDING):  acetaminophen     Tablet .. 975 milliGRAM(s) Oral every 8 hours  bisacodyl 5 milliGRAM(s) Oral at bedtime  chlorhexidine 2% Cloths 1 Application(s) Topical daily  cholecalciferol 400 Unit(s) Oral daily  dextrose 5%. 1000 milliLiter(s) (50 mL/Hr) IV Continuous <Continuous>  dextrose 5%. 1000 milliLiter(s) (100 mL/Hr) IV Continuous <Continuous>  dextrose 50% Injectable 25 Gram(s) IV Push once  dextrose 50% Injectable 12.5 Gram(s) IV Push once  dextrose 50% Injectable 25 Gram(s) IV Push once  gabapentin 300 milliGRAM(s) Oral three times a day  glucagon  Injectable 1 milliGRAM(s) IntraMuscular once  influenza   Vaccine 0.5 milliLiter(s) IntraMuscular once  insulin glargine Injectable (LANTUS) 4 Unit(s) SubCutaneous at bedtime  insulin lispro (ADMELOG) corrective regimen sliding scale   SubCutaneous three times a day before meals  insulin lispro (ADMELOG) corrective regimen sliding scale   SubCutaneous at bedtime  insulin lispro Injectable (ADMELOG) 1 Unit(s) SubCutaneous three times a day before meals  lidocaine   4% Patch 1 Patch Transdermal daily  melatonin 3 milliGRAM(s) Oral at bedtime  methadone    Tablet 10 milliGRAM(s) Oral every 12 hours  montelukast 10 milliGRAM(s) Oral at bedtime  multivitamin 1 Tablet(s) Oral daily  ondansetron   Disintegrating Tablet 4 milliGRAM(s) Oral every 8 hours  oxyCODONE    IR 5 milliGRAM(s) Oral every 6 hours  pantoprazole    Tablet 40 milliGRAM(s) Oral before breakfast  polyethylene glycol 3350 17 Gram(s) Oral two times a day  povidone iodine 5% Nasal Swab 1 Application(s) Both Nostrils once  senna 2 Tablet(s) Oral at bedtime    MEDICATIONS  (PRN):  dextrose Oral Gel 15 Gram(s) Oral once PRN Blood Glucose LESS THAN 70 milliGRAM(s)/deciliter  meclizine 12.5 milliGRAM(s) Oral every 6 hours PRN Dizziness  naloxone 1 mG/mL Injection for Intranasal Use 0.4 milliGRAM(s) IntraNasal every 3 minutes PRN overdose  oxyCODONE    IR 10 milliGRAM(s) Oral every 3 hours PRN Moderate Pain (4 - 6)  oxyCODONE    IR 15 milliGRAM(s) Oral every 3 hours PRN Severe Pain (7 - 10)  prochlorperazine   Injectable 5 milliGRAM(s) IV Push every 8 hours PRN refractory nausea      Vital Signs Last 24 Hrs  T(C): 36.6 (28 Jan 2023 05:35), Max: 36.7 (27 Jan 2023 11:25)  T(F): 97.9 (28 Jan 2023 05:35), Max: 98.1 (27 Jan 2023 11:25)  HR: 81 (28 Jan 2023 05:35) (73 - 81)  BP: 120/66 (28 Jan 2023 05:35) (103/59 - 120/66)  BP(mean): --  RR: 16 (28 Jan 2023 05:35) (16 - 18)  SpO2: 100% (28 Jan 2023 05:35) (100% - 100%)    Parameters below as of 28 Jan 2023 05:35  Patient On (Oxygen Delivery Method): room air      CAPILLARY BLOOD GLUCOSE      POCT Blood Glucose.: 188 mg/dL (28 Jan 2023 08:27)  POCT Blood Glucose.: 107 mg/dL (27 Jan 2023 22:23)  POCT Blood Glucose.: 205 mg/dL (27 Jan 2023 17:48)  POCT Blood Glucose.: 165 mg/dL (27 Jan 2023 12:23)    I&O's Summary      PHYSICAL EXAM:  GENERAL: NAD, well-developed, lying in bed;  HEAD:  Atraumatic, Normocephalic, MMM  CHEST/LUNG: No use of accessory muscles, CTAB, breathing non-labored  COR: RR, no mrcg  ABD: Soft, ND/NT, +BS  PSYCH: AAOx3  NEUROLOGY: CN II-XII grossly intact, moving all extremities  SKIN: No rashes or lesions  EXT: wwp, no cce        LABS:                        11.1   6.96  )-----------( 88       ( 28 Jan 2023 06:00 )             33.7     01-28    136  |  104  |  8   ----------------------------<  115<H>  3.8   |  25  |  0.44<L>    Ca    8.5      28 Jan 2023 06:00  Phos  2.3     01-28  Mg     1.70     01-28      PT/INR - ( 27 Jan 2023 06:52 )   PT: 17.1 sec;   INR: 1.47 ratio         PTT - ( 27 Jan 2023 06:52 )  PTT:24.7 sec          RADIOLOGY & ADDITIONAL TESTS:  < from: CT Abdomen and Pelvis w/ IV Cont (01.27.23 @ 11:24) >  LOWER CHEST: Bibasilar subsegmental atelectasis. Partially visualized   right-sided central line with the tip in the right atrium.    LIVER: Within normal limits.  BILE DUCTS: Normal caliber.  GALLBLADDER: Cholecystectomy.  SPLEEN: Within normal limits.  PANCREAS: Status post Whipple. Surgical clips are noted in the region of   the pancreatic head.  ADRENALS: Within normal limits.  KIDNEYS/URETERS: No enhancing renal mass. Symmetric bilateral enhancement   of the renal parenchyma.    BLADDER: Distended.  REPRODUCTIVE ORGANS: Limited evaluation of the uterus and adnexa due to  adjacent beam hardening artifact. There is endometrial thickening with   the endometrium measuring 1.3 cm (12-75).    BOWEL: Status post Whipple surgery with surgical clips and stable   appearance of a soft tissue density in the region of the   ampulla/pancreatic head. No bowel obstruction. Appendix is normal.  A hyperdensity within the jejunum just distal to the gastrojejunostomy   likely represents an ingested pill.  PERITONEUM: No ascites.  VESSELS: Atherosclerotic changes.  RETROPERITONEUM/LYMPH NODES: No lymphadenopathy.  ABDOMINAL WALL: Postsurgical changes involving the abdominal wall and the   soft tissues of the left hip and buttock.  BONES: Degenerative changes. Status post left hip arthroplasty.    IMPRESSION:  No evidence of active gastrointestinal hemorrhage.  Endometrial thickening up to 1.3 cm (12/75). Consider ultrasound of the   pelvis if patient has postmenopausal to further evaluate.      < end of copied text >    Imaging Personally Reviewed:    Consultant(s) Notes Reviewed:  ortho     Care Discussed with Consultants/Other Providers:

## 2023-01-28 NOTE — PROGRESS NOTE ADULT - SUBJECTIVE AND OBJECTIVE BOX
Ortho Progress Note    S: Patient seen and examined. Mentating well. Pain controlled. S/p 2U PRBCs yesterday.      O:  Physical Exam:  Gen: Laying in bed, NAD, alert and oriented.   Resp: Unlabored breathing  LLExt:   Dressing c/d/i  EHL/FHL/TA/Sol intact          + SILT DP/SP/MURPHY/Sa/T          +DP, extremity WWP    Vital Signs Last 24 Hrs  T(C): 36.6 (27 Jan 2023 21:44), Max: 36.9 (27 Jan 2023 05:58)  T(F): 97.9 (27 Jan 2023 21:44), Max: 98.4 (27 Jan 2023 05:58)  HR: 77 (27 Jan 2023 21:44) (67 - 81)  BP: 115/61 (27 Jan 2023 21:44) (78/40 - 115/61)  RR: 16 (27 Jan 2023 21:44) (16 - 18)  SpO2: 100% (27 Jan 2023 21:44) (97% - 100%)  Parameters below as of 27 Jan 2023 21:44  Patient On (Oxygen Delivery Method): room air                            10.5   6.77  )-----------( 90       ( 27 Jan 2023 14:50 )             31.8                6.6    5.46  )-----------( 86       ( 27 Jan 2023 05:16 )             20.4                         7.0    6.95  )-----------( 87       ( 27 Jan 2023 03:45 )             21.8

## 2023-01-28 NOTE — PROGRESS NOTE ADULT - PROBLEM SELECTOR PLAN 2
- likely in setting of known malignancy (chronic disease) and recent chemotherapy  - now improving, normalized WBC and platelet count; anemia likely in setting of neoplastic disease  - today patient with drop in Hgb, suspect acute blood loss anemia likely 2/2 L hip hemiarthroplasty; CT L hip and a/p w/o hemorrhage; will transfuse 2U pRBC, repeat CBC this PM; BP improving - likely in setting of known malignancy (chronic disease) and recent chemotherapy  - now improving, normalized WBC and platelet count; anemia likely in setting of neoplastic disease  - today patient with drop in Hgb, suspect acute blood loss anemia likely 2/2 L hip hemiarthroplasty; CT L hip and a/p w/o hemorrhage; will transfuse 2U pRBC, repeat CBC noted, H/H improved and stable

## 2023-01-28 NOTE — PROGRESS NOTE ADULT - PROBLEM SELECTOR PLAN 8
DVT ppx: Lovenox  PT recommending FARIBA, however pt wants to go home  code: full code  Updated pt's daughter at bedside on 1/27, all questions answered DVT ppx: Lovenox on hold for concern for   L hip/thigh hematomas and drop in H/H, c/w venodynes   PT recommending FARIBA, however pt wants to go home  code: full code  Updated pt's daughter at bedside on 1/27, all questions answered

## 2023-01-28 NOTE — PROGRESS NOTE ADULT - ASSESSMENT
A/P: 57F with L pathologic FN Fx s/p Hemiarthroplasty 1/26    Observe L hip/thigh hematomas, no indication for repeat ortho surgery at this time  Please Transfuse for Hgb <8  Also please keep patient on high rate fluids (125) as patient is s/p hemiarthroplasty  Neuro: Pain control  Resp: IS  MSK: WBAT, PT/OT, posterior precautions, abduction pillow  Heme: DVT PPX: Lovenox (holding in setting of drop in H/H), venodynes   Appreciate rad onc recs  Appreciate medical recs

## 2023-01-29 LAB
ANION GAP SERPL CALC-SCNC: 4 MMOL/L — LOW (ref 7–14)
BUN SERPL-MCNC: 7 MG/DL — SIGNIFICANT CHANGE UP (ref 7–23)
CALCIUM SERPL-MCNC: 8.7 MG/DL — SIGNIFICANT CHANGE UP (ref 8.4–10.5)
CHLORIDE SERPL-SCNC: 101 MMOL/L — SIGNIFICANT CHANGE UP (ref 98–107)
CO2 SERPL-SCNC: 28 MMOL/L — SIGNIFICANT CHANGE UP (ref 22–31)
CREAT SERPL-MCNC: 0.5 MG/DL — SIGNIFICANT CHANGE UP (ref 0.5–1.3)
EGFR: 109 ML/MIN/1.73M2 — SIGNIFICANT CHANGE UP
GLUCOSE BLDC GLUCOMTR-MCNC: 116 MG/DL — HIGH (ref 70–99)
GLUCOSE BLDC GLUCOMTR-MCNC: 145 MG/DL — HIGH (ref 70–99)
GLUCOSE BLDC GLUCOMTR-MCNC: 151 MG/DL — HIGH (ref 70–99)
GLUCOSE BLDC GLUCOMTR-MCNC: 162 MG/DL — HIGH (ref 70–99)
GLUCOSE SERPL-MCNC: 103 MG/DL — HIGH (ref 70–99)
HCT VFR BLD CALC: 34.1 % — LOW (ref 34.5–45)
HGB BLD-MCNC: 11.3 G/DL — LOW (ref 11.5–15.5)
MAGNESIUM SERPL-MCNC: 1.9 MG/DL — SIGNIFICANT CHANGE UP (ref 1.6–2.6)
MCHC RBC-ENTMCNC: 26.3 PG — LOW (ref 27–34)
MCHC RBC-ENTMCNC: 33.1 GM/DL — SIGNIFICANT CHANGE UP (ref 32–36)
MCV RBC AUTO: 79.3 FL — LOW (ref 80–100)
NRBC # BLD: 0 /100 WBCS — SIGNIFICANT CHANGE UP (ref 0–0)
NRBC # FLD: 0 K/UL — SIGNIFICANT CHANGE UP (ref 0–0)
PHOSPHATE SERPL-MCNC: 3.1 MG/DL — SIGNIFICANT CHANGE UP (ref 2.5–4.5)
PLATELET # BLD AUTO: 102 K/UL — LOW (ref 150–400)
POTASSIUM SERPL-MCNC: 3.9 MMOL/L — SIGNIFICANT CHANGE UP (ref 3.5–5.3)
POTASSIUM SERPL-SCNC: 3.9 MMOL/L — SIGNIFICANT CHANGE UP (ref 3.5–5.3)
RBC # BLD: 4.3 M/UL — SIGNIFICANT CHANGE UP (ref 3.8–5.2)
RBC # FLD: 15.2 % — HIGH (ref 10.3–14.5)
SODIUM SERPL-SCNC: 133 MMOL/L — LOW (ref 135–145)
WBC # BLD: 6.11 K/UL — SIGNIFICANT CHANGE UP (ref 3.8–10.5)
WBC # FLD AUTO: 6.11 K/UL — SIGNIFICANT CHANGE UP (ref 3.8–10.5)

## 2023-01-29 PROCEDURE — 99233 SBSQ HOSP IP/OBS HIGH 50: CPT

## 2023-01-29 PROCEDURE — 76856 US EXAM PELVIC COMPLETE: CPT | Mod: 26

## 2023-01-29 RX ORDER — ENOXAPARIN SODIUM 100 MG/ML
30 INJECTION SUBCUTANEOUS EVERY 24 HOURS
Refills: 0 | Status: DISCONTINUED | OUTPATIENT
Start: 2023-01-29 | End: 2023-02-03

## 2023-01-29 RX ADMIN — Medication 1 UNIT(S): at 13:03

## 2023-01-29 RX ADMIN — METHADONE HYDROCHLORIDE 10 MILLIGRAM(S): 40 TABLET ORAL at 06:40

## 2023-01-29 RX ADMIN — OXYCODONE HYDROCHLORIDE 5 MILLIGRAM(S): 5 TABLET ORAL at 19:18

## 2023-01-29 RX ADMIN — Medication 975 MILLIGRAM(S): at 13:18

## 2023-01-29 RX ADMIN — PANTOPRAZOLE SODIUM 40 MILLIGRAM(S): 20 TABLET, DELAYED RELEASE ORAL at 06:41

## 2023-01-29 RX ADMIN — Medication 975 MILLIGRAM(S): at 06:54

## 2023-01-29 RX ADMIN — GABAPENTIN 300 MILLIGRAM(S): 400 CAPSULE ORAL at 13:03

## 2023-01-29 RX ADMIN — Medication 2: at 13:02

## 2023-01-29 RX ADMIN — Medication 975 MILLIGRAM(S): at 06:40

## 2023-01-29 RX ADMIN — ONDANSETRON 4 MILLIGRAM(S): 8 TABLET, FILM COATED ORAL at 13:03

## 2023-01-29 RX ADMIN — OXYCODONE HYDROCHLORIDE 5 MILLIGRAM(S): 5 TABLET ORAL at 07:39

## 2023-01-29 RX ADMIN — Medication 5 MILLIGRAM(S): at 23:19

## 2023-01-29 RX ADMIN — ONDANSETRON 4 MILLIGRAM(S): 8 TABLET, FILM COATED ORAL at 23:19

## 2023-01-29 RX ADMIN — Medication 1 TABLET(S): at 11:58

## 2023-01-29 RX ADMIN — SENNA PLUS 2 TABLET(S): 8.6 TABLET ORAL at 23:19

## 2023-01-29 RX ADMIN — Medication 975 MILLIGRAM(S): at 13:04

## 2023-01-29 RX ADMIN — ENOXAPARIN SODIUM 30 MILLIGRAM(S): 100 INJECTION SUBCUTANEOUS at 19:18

## 2023-01-29 RX ADMIN — Medication 1 UNIT(S): at 09:13

## 2023-01-29 RX ADMIN — OXYCODONE HYDROCHLORIDE 5 MILLIGRAM(S): 5 TABLET ORAL at 06:40

## 2023-01-29 RX ADMIN — OXYCODONE HYDROCHLORIDE 5 MILLIGRAM(S): 5 TABLET ORAL at 11:57

## 2023-01-29 RX ADMIN — INSULIN GLARGINE 4 UNIT(S): 100 INJECTION, SOLUTION SUBCUTANEOUS at 23:20

## 2023-01-29 RX ADMIN — Medication 1 UNIT(S): at 19:25

## 2023-01-29 RX ADMIN — GABAPENTIN 300 MILLIGRAM(S): 400 CAPSULE ORAL at 06:40

## 2023-01-29 RX ADMIN — Medication 975 MILLIGRAM(S): at 23:29

## 2023-01-29 RX ADMIN — Medication 400 UNIT(S): at 11:57

## 2023-01-29 RX ADMIN — OXYCODONE HYDROCHLORIDE 5 MILLIGRAM(S): 5 TABLET ORAL at 19:24

## 2023-01-29 RX ADMIN — METHADONE HYDROCHLORIDE 10 MILLIGRAM(S): 40 TABLET ORAL at 19:18

## 2023-01-29 RX ADMIN — Medication 3 MILLIGRAM(S): at 23:20

## 2023-01-29 RX ADMIN — OXYCODONE HYDROCHLORIDE 5 MILLIGRAM(S): 5 TABLET ORAL at 00:37

## 2023-01-29 RX ADMIN — MONTELUKAST 10 MILLIGRAM(S): 4 TABLET, CHEWABLE ORAL at 23:19

## 2023-01-29 RX ADMIN — OXYCODONE HYDROCHLORIDE 5 MILLIGRAM(S): 5 TABLET ORAL at 12:15

## 2023-01-29 RX ADMIN — CHLORHEXIDINE GLUCONATE 1 APPLICATION(S): 213 SOLUTION TOPICAL at 11:57

## 2023-01-29 RX ADMIN — GABAPENTIN 300 MILLIGRAM(S): 400 CAPSULE ORAL at 23:20

## 2023-01-29 RX ADMIN — ONDANSETRON 4 MILLIGRAM(S): 8 TABLET, FILM COATED ORAL at 06:41

## 2023-01-29 RX ADMIN — OXYCODONE HYDROCHLORIDE 5 MILLIGRAM(S): 5 TABLET ORAL at 23:18

## 2023-01-29 RX ADMIN — Medication 2: at 09:13

## 2023-01-29 NOTE — PROGRESS NOTE ADULT - SUBJECTIVE AND OBJECTIVE BOX
Patient is a 58y old  Female who presents with a chief complaint of Difficulty ambulating (29 Jan 2023 02:54)      SUBJECTIVE / OVERNIGHT EVENTS:    MEDICATIONS  (STANDING):  acetaminophen     Tablet .. 975 milliGRAM(s) Oral every 8 hours  bisacodyl 5 milliGRAM(s) Oral at bedtime  chlorhexidine 2% Cloths 1 Application(s) Topical daily  cholecalciferol 400 Unit(s) Oral daily  dextrose 5%. 1000 milliLiter(s) (100 mL/Hr) IV Continuous <Continuous>  dextrose 5%. 1000 milliLiter(s) (50 mL/Hr) IV Continuous <Continuous>  dextrose 50% Injectable 25 Gram(s) IV Push once  dextrose 50% Injectable 12.5 Gram(s) IV Push once  dextrose 50% Injectable 25 Gram(s) IV Push once  gabapentin 300 milliGRAM(s) Oral three times a day  glucagon  Injectable 1 milliGRAM(s) IntraMuscular once  influenza   Vaccine 0.5 milliLiter(s) IntraMuscular once  insulin glargine Injectable (LANTUS) 4 Unit(s) SubCutaneous at bedtime  insulin lispro (ADMELOG) corrective regimen sliding scale   SubCutaneous three times a day before meals  insulin lispro (ADMELOG) corrective regimen sliding scale   SubCutaneous at bedtime  insulin lispro Injectable (ADMELOG) 1 Unit(s) SubCutaneous three times a day before meals  lidocaine   4% Patch 1 Patch Transdermal daily  melatonin 3 milliGRAM(s) Oral at bedtime  methadone    Tablet 10 milliGRAM(s) Oral every 12 hours  montelukast 10 milliGRAM(s) Oral at bedtime  multivitamin 1 Tablet(s) Oral daily  ondansetron   Disintegrating Tablet 4 milliGRAM(s) Oral every 8 hours  oxyCODONE    IR 5 milliGRAM(s) Oral every 6 hours  pantoprazole    Tablet 40 milliGRAM(s) Oral before breakfast  polyethylene glycol 3350 17 Gram(s) Oral two times a day  povidone iodine 5% Nasal Swab 1 Application(s) Both Nostrils once  senna 2 Tablet(s) Oral at bedtime    MEDICATIONS  (PRN):  dextrose Oral Gel 15 Gram(s) Oral once PRN Blood Glucose LESS THAN 70 milliGRAM(s)/deciliter  meclizine 12.5 milliGRAM(s) Oral every 6 hours PRN Dizziness  naloxone 1 mG/mL Injection for Intranasal Use 0.4 milliGRAM(s) IntraNasal every 3 minutes PRN overdose  oxyCODONE    IR 10 milliGRAM(s) Oral every 3 hours PRN Moderate Pain (4 - 6)  oxyCODONE    IR 15 milliGRAM(s) Oral every 3 hours PRN Severe Pain (7 - 10)  prochlorperazine   Injectable 5 milliGRAM(s) IV Push every 8 hours PRN refractory nausea      Vital Signs Last 24 Hrs  T(C): 36.6 (29 Jan 2023 05:00), Max: 37 (28 Jan 2023 12:36)  T(F): 97.8 (29 Jan 2023 05:00), Max: 98.6 (28 Jan 2023 12:36)  HR: 73 (29 Jan 2023 05:00) (73 - 89)  BP: 98/65 (29 Jan 2023 05:00) (98/65 - 112/64)  BP(mean): --  RR: 17 (29 Jan 2023 05:00) (16 - 17)  SpO2: 100% (29 Jan 2023 05:00) (100% - 100%)    Parameters below as of 29 Jan 2023 05:00  Patient On (Oxygen Delivery Method): room air      CAPILLARY BLOOD GLUCOSE      POCT Blood Glucose.: 130 mg/dL (28 Jan 2023 22:21)  POCT Blood Glucose.: 106 mg/dL (28 Jan 2023 18:14)  POCT Blood Glucose.: 149 mg/dL (28 Jan 2023 12:29)  POCT Blood Glucose.: 188 mg/dL (28 Jan 2023 08:27)    I&O's Summary      PHYSICAL EXAM:  GENERAL: NAD, well-developed  HEAD:  Atraumatic, Normocephalic  EYES: EOMI, PERRLA, conjunctiva and sclera clear  NECK: Supple, No JVD  CHEST/LUNG: Clear to auscultation bilaterally; No wheeze  HEART: Regular rate and rhythm; No murmurs, rubs, or gallops  ABDOMEN: Soft, Nontender, Nondistended; Bowel sounds present  EXTREMITIES:  2+ Peripheral Pulses, No clubbing, cyanosis, or edema  PSYCH: AAOx3  NEUROLOGY: non-focal  SKIN: No rashes or lesions    LABS:                        11.3   6.11  )-----------( 102      ( 29 Jan 2023 06:11 )             34.1     01-28    136  |  104  |  8   ----------------------------<  115<H>  3.8   |  25  |  0.44<L>    Ca    8.5      28 Jan 2023 06:00  Phos  2.3     01-28  Mg     1.70     01-28                RADIOLOGY & ADDITIONAL TESTS:    Imaging Personally Reviewed:    Consultant(s) Notes Reviewed:      Care Discussed with Consultants/Other Providers:   Patient is a 58y old  Female who presents with a chief complaint of Difficulty ambulating (29 Jan 2023 02:54)      SUBJECTIVE / OVERNIGHT EVENTS: patient seen and examined by bedside, awake and alert, pain moderately controlled, denies headache, dizziness, SOB, CP, Palpitations , N/V/D, abdominal pain, pt had a BM yesterday    at bedside       MEDICATIONS  (STANDING):  acetaminophen     Tablet .. 975 milliGRAM(s) Oral every 8 hours  bisacodyl 5 milliGRAM(s) Oral at bedtime  chlorhexidine 2% Cloths 1 Application(s) Topical daily  cholecalciferol 400 Unit(s) Oral daily  dextrose 5%. 1000 milliLiter(s) (100 mL/Hr) IV Continuous <Continuous>  dextrose 5%. 1000 milliLiter(s) (50 mL/Hr) IV Continuous <Continuous>  dextrose 50% Injectable 25 Gram(s) IV Push once  dextrose 50% Injectable 12.5 Gram(s) IV Push once  dextrose 50% Injectable 25 Gram(s) IV Push once  gabapentin 300 milliGRAM(s) Oral three times a day  glucagon  Injectable 1 milliGRAM(s) IntraMuscular once  influenza   Vaccine 0.5 milliLiter(s) IntraMuscular once  insulin glargine Injectable (LANTUS) 4 Unit(s) SubCutaneous at bedtime  insulin lispro (ADMELOG) corrective regimen sliding scale   SubCutaneous three times a day before meals  insulin lispro (ADMELOG) corrective regimen sliding scale   SubCutaneous at bedtime  insulin lispro Injectable (ADMELOG) 1 Unit(s) SubCutaneous three times a day before meals  lidocaine   4% Patch 1 Patch Transdermal daily  melatonin 3 milliGRAM(s) Oral at bedtime  methadone    Tablet 10 milliGRAM(s) Oral every 12 hours  montelukast 10 milliGRAM(s) Oral at bedtime  multivitamin 1 Tablet(s) Oral daily  ondansetron   Disintegrating Tablet 4 milliGRAM(s) Oral every 8 hours  oxyCODONE    IR 5 milliGRAM(s) Oral every 6 hours  pantoprazole    Tablet 40 milliGRAM(s) Oral before breakfast  polyethylene glycol 3350 17 Gram(s) Oral two times a day  povidone iodine 5% Nasal Swab 1 Application(s) Both Nostrils once  senna 2 Tablet(s) Oral at bedtime    MEDICATIONS  (PRN):  dextrose Oral Gel 15 Gram(s) Oral once PRN Blood Glucose LESS THAN 70 milliGRAM(s)/deciliter  meclizine 12.5 milliGRAM(s) Oral every 6 hours PRN Dizziness  naloxone 1 mG/mL Injection for Intranasal Use 0.4 milliGRAM(s) IntraNasal every 3 minutes PRN overdose  oxyCODONE    IR 10 milliGRAM(s) Oral every 3 hours PRN Moderate Pain (4 - 6)  oxyCODONE    IR 15 milliGRAM(s) Oral every 3 hours PRN Severe Pain (7 - 10)  prochlorperazine   Injectable 5 milliGRAM(s) IV Push every 8 hours PRN refractory nausea      Vital Signs Last 24 Hrs  T(C): 36.6 (29 Jan 2023 05:00), Max: 37 (28 Jan 2023 12:36)  T(F): 97.8 (29 Jan 2023 05:00), Max: 98.6 (28 Jan 2023 12:36)  HR: 73 (29 Jan 2023 05:00) (73 - 89)  BP: 98/65 (29 Jan 2023 05:00) (98/65 - 112/64)  BP(mean): --  RR: 17 (29 Jan 2023 05:00) (16 - 17)  SpO2: 100% (29 Jan 2023 05:00) (100% - 100%)    Parameters below as of 29 Jan 2023 05:00  Patient On (Oxygen Delivery Method): room air      CAPILLARY BLOOD GLUCOSE      POCT Blood Glucose.: 130 mg/dL (28 Jan 2023 22:21)  POCT Blood Glucose.: 106 mg/dL (28 Jan 2023 18:14)  POCT Blood Glucose.: 149 mg/dL (28 Jan 2023 12:29)  POCT Blood Glucose.: 188 mg/dL (28 Jan 2023 08:27)    I&O's Summary      PHYSICAL EXAM:  GENERAL: NAD, well-developed, lying in bed;  HEAD:  Atraumatic, Normocephalic, MMM  CHEST/LUNG: No use of accessory muscles, CTAB, breathing non-labored  COR: RR, no mrcg  ABD: Soft, ND/NT, +BS  PSYCH: AAOx3  NEUROLOGY: CN II-XII grossly intact, moving all extremities  SKIN: No rashes or lesions  EXT: wwp, no cce, surgical site on left with dressing, c/d/i         LABS:                        11.3   6.11  )-----------( 102      ( 29 Jan 2023 06:11 )             34.1     01-29    133<L>  |  101  |  7   ----------------------------<  103<H>  3.9   |  28  |  0.50    Ca    8.7      29 Jan 2023 06:11  Phos  3.1     01-29  Mg     1.90     01-29                    RADIOLOGY & ADDITIONAL TESTS:    Imaging Personally Reviewed:    Consultant(s) Notes Reviewed:  ortho     Care Discussed with Consultants/Other Providers:

## 2023-01-29 NOTE — PROGRESS NOTE ADULT - PROBLEM SELECTOR PLAN 2
- likely in setting of known malignancy (chronic disease) and recent chemotherapy  - now improving, normalized WBC and platelet count; anemia likely in setting of neoplastic disease  - today patient with drop in Hgb, suspect acute blood loss anemia likely 2/2 L hip hemiarthroplasty; CT L hip and a/p w/o hemorrhage; will transfuse 2U pRBC, repeat CBC noted, H/H improved and stable - likely in setting of known malignancy (chronic disease) and recent chemotherapy  - now improving, normalized WBC and platelet count; anemia likely in setting of neoplastic disease  - today patient with drop in Hgb, suspect acute blood loss anemia likely 2/2 L hip hemiarthroplasty; CT L hip and a/p w/o hemorrhage; s/p 2U pRBC, repeat CBC noted, H/H improved and stable

## 2023-01-29 NOTE — CHART NOTE - NSCHARTNOTEFT_GEN_A_CORE
Discussed w/ Ortho Fellow ([id number]) to confirm when cleared to resume DVT PPX.  Per Ortho Fellow, to be discussed with Ortho Attending and will update Primary Team once plan is confirmed.      Miriam Barlow NP-BC  Department of Medicine  In House Pager #[phone]

## 2023-01-29 NOTE — PROGRESS NOTE ADULT - ASSESSMENT
Rx request   Requested Prescriptions     Pending Prescriptions Disp Refills    levothyroxine (SYNTHROID) 50 MCG tablet 90 tablet 1     Sig: TAKE 1 TABLET BY MOUTH ONE TIME A DAY     LOV 11/24/2020  Next Visit Date:  Future Appointments   Date Time Provider Elizabeth Brock   3/19/2021 11:00 AM Brinda Favre, OTR/L Joss Zamora AM OT [address] [place] [address]   3/22/2021  3:00 PM Shell Jacob [address]   3/23/2021  4:00 PM Trudy England PA-C [id number] [facility]   3/26/2021 11:00 AM Jose Houston OTR/L Joss Zamora AM OT [address] [place] [address]   3/26/2021 12:45 PM Shivam Shelley,  [facility]   3/29/2021  3:00 PM Aimee [address] 56 yo Sylheti-speaking F with PMH pancreatic CA (mets to L spine, L femur; s/p chemo with abraxane/gemzar 12/27, radiation ?12/15; s/p Whipple in 2017), Type 2 DM, GERD, asthma, and HTN p/w LLE pain x 1mo with difficulty ambulating x1wk. Likely in the setting of known metastatic disease. XR showed lucency on the L femoral neck. Course c/b severe pain, N/V, constipation -- palliative following for pain recs. Ortho deferring surgical management of L femur until radiation completed. Rad-onc following, completed 5 sessions of RT. Course c/b hip fracture now s/p L hip hemiarthroplasty. Further complicated by acute blood loss anemia.

## 2023-01-29 NOTE — PROGRESS NOTE ADULT - PROBLEM SELECTOR PLAN 8
DVT ppx: Lovenox on hold for concern for   L hip/thigh hematomas and drop in H/H, c/w venodynes   PT recommending FARIBA, however pt wants to go home  code: full code  Updated pt's daughter at bedside on 1/27, all questions answered DVT ppx: Lovenox on hold for concern for   L hip/thigh hematomas and drop in H/H, c/w venodynes   PT recommending FARIBA, however pt wants to go home  code: full code  Updated pt's daughter on 1/28 and   at bedside on 1/29, all questions answered   case d/w ACP

## 2023-01-29 NOTE — PROGRESS NOTE ADULT - ASSESSMENT
A/P: 57F with L pathologic FN Fx s/p Hemiarthroplasty 1/26    Observe L hip/thigh hematomas, no indication for repeat ortho surgery at this time  Please Transfuse for Hgb <8  Neuro: Pain control  Resp: IS  MSK: WBAT, PT/OT, posterior precautions, abduction pillow  Heme: DVT PPX: Lovenox (holding in setting of drop in H/H), venodynes   Appreciate rad onc recs  Appreciate medical recs

## 2023-01-29 NOTE — PROGRESS NOTE ADULT - SUBJECTIVE AND OBJECTIVE BOX
Orthopaedic Surgery Progress Note    Subjective:   Patient seen and examined. No acute events overnight. States pain is controlled.    Objective:  T(C): 36.9 (01-28-23 @ 20:13), Max: 37 (01-28-23 @ 12:36)  HR: 89 (01-28-23 @ 20:13) (76 - 89)  BP: 112/64 (01-28-23 @ 20:13) (110/65 - 120/66)  RR: 17 (01-28-23 @ 20:13) (16 - 17)  SpO2: 100% (01-28-23 @ 20:13) (100% - 100%)  Wt(kg): --      Physical Exam:  Gen: Laying in bed, NAD, alert and oriented.   Resp: Unlabored breathing  LLExt:   Dressing c/d/i  EHL/FHL/TA/Sol intact          + SILT DP/SP/MURPHY/Sa/T          +DP, extremity WWP                          11.1   6.96  )-----------( 88       ( 28 Jan 2023 06:00 )             33.7     01-28    136  |  104  |  8   ----------------------------<  115<H>  3.8   |  25  |  0.44<L>    Ca    8.5      28 Jan 2023 06:00  Phos  2.3     01-28  Mg     1.70     01-28      PT/INR - ( 27 Jan 2023 06:52 )   PT: 17.1 sec;   INR: 1.47 ratio         PTT - ( 27 Jan 2023 06:52 )  PTT:24.7 sec

## 2023-01-30 ENCOUNTER — APPOINTMENT (OUTPATIENT)
Dept: INFUSION THERAPY | Facility: HOSPITAL | Age: 58
End: 2023-01-30

## 2023-01-30 LAB
ANION GAP SERPL CALC-SCNC: 7 MMOL/L — SIGNIFICANT CHANGE UP (ref 7–14)
BUN SERPL-MCNC: 8 MG/DL — SIGNIFICANT CHANGE UP (ref 7–23)
CALCIUM SERPL-MCNC: 8.6 MG/DL — SIGNIFICANT CHANGE UP (ref 8.4–10.5)
CHLORIDE SERPL-SCNC: 100 MMOL/L — SIGNIFICANT CHANGE UP (ref 98–107)
CO2 SERPL-SCNC: 27 MMOL/L — SIGNIFICANT CHANGE UP (ref 22–31)
CREAT SERPL-MCNC: 0.42 MG/DL — LOW (ref 0.5–1.3)
EGFR: 114 ML/MIN/1.73M2 — SIGNIFICANT CHANGE UP
GLUCOSE BLDC GLUCOMTR-MCNC: 127 MG/DL — HIGH (ref 70–99)
GLUCOSE SERPL-MCNC: 165 MG/DL — HIGH (ref 70–99)
HCT VFR BLD CALC: 31.8 % — LOW (ref 34.5–45)
HGB BLD-MCNC: 10.5 G/DL — LOW (ref 11.5–15.5)
MAGNESIUM SERPL-MCNC: 1.7 MG/DL — SIGNIFICANT CHANGE UP (ref 1.6–2.6)
MCHC RBC-ENTMCNC: 26.1 PG — LOW (ref 27–34)
MCHC RBC-ENTMCNC: 33 GM/DL — SIGNIFICANT CHANGE UP (ref 32–36)
MCV RBC AUTO: 78.9 FL — LOW (ref 80–100)
NRBC # BLD: 0 /100 WBCS — SIGNIFICANT CHANGE UP (ref 0–0)
NRBC # FLD: 0 K/UL — SIGNIFICANT CHANGE UP (ref 0–0)
PHOSPHATE SERPL-MCNC: 3.5 MG/DL — SIGNIFICANT CHANGE UP (ref 2.5–4.5)
PLATELET # BLD AUTO: 101 K/UL — LOW (ref 150–400)
POTASSIUM SERPL-MCNC: 3.6 MMOL/L — SIGNIFICANT CHANGE UP (ref 3.5–5.3)
POTASSIUM SERPL-SCNC: 3.6 MMOL/L — SIGNIFICANT CHANGE UP (ref 3.5–5.3)
RBC # BLD: 4.03 M/UL — SIGNIFICANT CHANGE UP (ref 3.8–5.2)
RBC # FLD: 15.6 % — HIGH (ref 10.3–14.5)
SODIUM SERPL-SCNC: 134 MMOL/L — LOW (ref 135–145)
WBC # BLD: 5.38 K/UL — SIGNIFICANT CHANGE UP (ref 3.8–10.5)
WBC # FLD AUTO: 5.38 K/UL — SIGNIFICANT CHANGE UP (ref 3.8–10.5)

## 2023-01-30 PROCEDURE — 99233 SBSQ HOSP IP/OBS HIGH 50: CPT

## 2023-01-30 RX ORDER — OXYCODONE HYDROCHLORIDE 5 MG/1
5 TABLET ORAL EVERY 4 HOURS
Refills: 0 | Status: DISCONTINUED | OUTPATIENT
Start: 2023-01-30 | End: 2023-02-02

## 2023-01-30 RX ORDER — OXYCODONE HYDROCHLORIDE 5 MG/1
10 TABLET ORAL EVERY 4 HOURS
Refills: 0 | Status: DISCONTINUED | OUTPATIENT
Start: 2023-01-30 | End: 2023-02-02

## 2023-01-30 RX ADMIN — CHLORHEXIDINE GLUCONATE 1 APPLICATION(S): 213 SOLUTION TOPICAL at 13:05

## 2023-01-30 RX ADMIN — Medication 1 TABLET(S): at 13:05

## 2023-01-30 RX ADMIN — ENOXAPARIN SODIUM 30 MILLIGRAM(S): 100 INJECTION SUBCUTANEOUS at 18:08

## 2023-01-30 RX ADMIN — Medication 975 MILLIGRAM(S): at 23:36

## 2023-01-30 RX ADMIN — Medication 2: at 13:06

## 2023-01-30 RX ADMIN — GABAPENTIN 300 MILLIGRAM(S): 400 CAPSULE ORAL at 22:38

## 2023-01-30 RX ADMIN — Medication 975 MILLIGRAM(S): at 22:36

## 2023-01-30 RX ADMIN — Medication 1 UNIT(S): at 13:06

## 2023-01-30 RX ADMIN — Medication 400 UNIT(S): at 13:04

## 2023-01-30 RX ADMIN — Medication 1 UNIT(S): at 18:09

## 2023-01-30 RX ADMIN — OXYCODONE HYDROCHLORIDE 5 MILLIGRAM(S): 5 TABLET ORAL at 07:05

## 2023-01-30 RX ADMIN — ONDANSETRON 4 MILLIGRAM(S): 8 TABLET, FILM COATED ORAL at 07:07

## 2023-01-30 RX ADMIN — METHADONE HYDROCHLORIDE 10 MILLIGRAM(S): 40 TABLET ORAL at 18:08

## 2023-01-30 RX ADMIN — INSULIN GLARGINE 4 UNIT(S): 100 INJECTION, SOLUTION SUBCUTANEOUS at 22:37

## 2023-01-30 RX ADMIN — Medication 975 MILLIGRAM(S): at 07:53

## 2023-01-30 RX ADMIN — Medication 1 UNIT(S): at 09:12

## 2023-01-30 RX ADMIN — ONDANSETRON 4 MILLIGRAM(S): 8 TABLET, FILM COATED ORAL at 22:38

## 2023-01-30 RX ADMIN — PANTOPRAZOLE SODIUM 40 MILLIGRAM(S): 20 TABLET, DELAYED RELEASE ORAL at 07:06

## 2023-01-30 RX ADMIN — OXYCODONE HYDROCHLORIDE 5 MILLIGRAM(S): 5 TABLET ORAL at 07:53

## 2023-01-30 RX ADMIN — ONDANSETRON 4 MILLIGRAM(S): 8 TABLET, FILM COATED ORAL at 15:11

## 2023-01-30 RX ADMIN — METHADONE HYDROCHLORIDE 10 MILLIGRAM(S): 40 TABLET ORAL at 07:07

## 2023-01-30 RX ADMIN — GABAPENTIN 300 MILLIGRAM(S): 400 CAPSULE ORAL at 07:07

## 2023-01-30 RX ADMIN — GABAPENTIN 300 MILLIGRAM(S): 400 CAPSULE ORAL at 15:11

## 2023-01-30 RX ADMIN — Medication 975 MILLIGRAM(S): at 15:15

## 2023-01-30 RX ADMIN — MONTELUKAST 10 MILLIGRAM(S): 4 TABLET, CHEWABLE ORAL at 22:39

## 2023-01-30 RX ADMIN — OXYCODONE HYDROCHLORIDE 5 MILLIGRAM(S): 5 TABLET ORAL at 00:18

## 2023-01-30 RX ADMIN — Medication 975 MILLIGRAM(S): at 00:16

## 2023-01-30 RX ADMIN — Medication 2: at 18:08

## 2023-01-30 RX ADMIN — Medication 975 MILLIGRAM(S): at 07:05

## 2023-01-30 RX ADMIN — Medication 975 MILLIGRAM(S): at 16:15

## 2023-01-30 NOTE — PROGRESS NOTE ADULT - PROBLEM SELECTOR PLAN 3
- s/p 5 fractions of RT - completed on 1/24  - PO Methadone 10mg BID (QTC on 1/23 - 416) (dose increased on 1/23)   - Discontinue PO Oxy IR 5mg q6 ATC as pain improving and dizziness improves with decreasing oxycodone doses   - PO Gabapentin 300mg TID  - Decrease to PO Oxy IR 5mg q4 PRN moderate pain (hold for hypotension, oversedation, respiratory depression)  - Decrease to PO Oxy IR 10mg q4 PRN severe pain (hold for hypotension, oversedation, respiratory depression)  - Bowel regimen while on opiates  - Narcan PRN

## 2023-01-30 NOTE — PROGRESS NOTE ADULT - PROBLEM SELECTOR PLAN 2
- likely in setting of known malignancy (chronic disease) and recent chemotherapy  - now improving, normalized WBC and platelet count; anemia likely in setting of neoplastic disease  - today patient with drop in Hgb, suspect acute blood loss anemia likely 2/2 L hip hemiarthroplasty; CT L hip and a/p w/o hemorrhage; s/p 2U pRBC, repeat CBC noted, H/H improved and stable

## 2023-01-30 NOTE — PROGRESS NOTE ADULT - ASSESSMENT
56 yo Sylheti-speaking F with PMH pancreatic CA (mets to L spine, L femur; s/p chemo with abraxane/gemzar 12/27, radiation ?12/15; s/p Whipple in 2017), Type 2 DM, GERD, asthma, and HTN p/w LLE pain x 1mo with difficulty ambulating x1wk. Likely in the setting of known metastatic disease. XR showed lucency on the L femoral neck. Course c/b severe pain, N/V, constipation -- palliative following for pain recs. Ortho deferring surgical management of L femur until radiation completed. Rad-onc following, completed 5 sessions of RT. Course c/b hip fracture now s/p L hip hemiarthroplasty. Further complicated by acute blood loss anemia.

## 2023-01-30 NOTE — CHART NOTE - NSCHARTNOTEFT_GEN_A_CORE
Source: Patient [ X]    Family [X ] daughter at bedside    other [X ] electronic chart, RN    Diet : Diet, Regular:   Halal  No Beef  No Pork  Supplement Feeding Modality:  Oral  Ensure Max Cans or Servings Per Day:  1       Frequency:  Three Times a day (01-09-23 @ 14:09)    Nutrition Follow-up note, severe malnutrition.  56 yo F with medical history of pancreat CA (mets to L spine, L femur; Cong, chemo ambraxane/gemzar 12/27, radiation ?12/15), T2D, GERD, asthma, and HTN p/w LLE pain x 1mo with difficulty ambulating x1wk. Likely in the setting of known metastatic disease.    Met with patient and daughter at bedside. Reports appetite and PO intake improving since yesterday. Patient is eating more than during this admission. Eating food from home mostly brought by daughter. Pain also improving. Reinforced strategies to increase energy intake and having PO supplement for added calories and protein. Patient denies any nausea/vomiting/diarrhea/constipation or difficulty chewing and swallowing. Last recorded bowel movement on 1/28. No further questions or concerns voiced at this time.      Weight (kg): 41.7 (01-26 @ 07:20)   41.7kg (1/11)    41.7kg (1/9)      Pertinent Medications: acetaminophen     Tablet ..  bisacodyl  cholecalciferol  dextrose 5%.  dextrose 5%.  dextrose 50% Injectable  dextrose 50% Injectable  dextrose 50% Injectable  gabapentin  glucagon  Injectable  insulin glargine Injectable (LANTUS)  insulin lispro (ADMELOG) corrective regimen sliding scale  insulin lispro (ADMELOG) corrective regimen sliding scale  insulin lispro Injectable (ADMELOG)  methadone    Tablet  montelukast  multivitamin  ondansetron   Disintegrating Tablet  pantoprazole    Tablet  polyethylene glycol 3350  senna    Pertinent Labs:  01-30 Na134 mmol/L<L> Glu 165 mg/dL<H> K+ 3.6 mmol/L Cr  0.42 mg/dL<L> BUN 8 mg/dL 01-30 Phos 3.5 mg/dL      Skin: No pressure ulcers/DTI noted in flowsheets.   No edema noted.     Estimated Needs:   [X ] no change since previous assessment  [ ] recalculated:       Previous Nutrition Diagnosis:     [X ] Malnutrition, Severe     Nutrition Diagnosis is [ X] ongoing  [ ] resolved [ ] not applicable     Additional Recommendations:     1. Continue current diet order, which remains appropriate at this time.  2. Monitor weights, labs, BM's, skin integrity, p.o. intake.   3. Please document % PO intake in nursing flowsheet.   4. Please Encourage po intake, assist with meals and menu selections, provide alternatives PRN.   5. Honor food and beverage preferences within diet restriction of patient in an effort to maximize level of nutrient intake.

## 2023-01-30 NOTE — PROGRESS NOTE ADULT - SUBJECTIVE AND OBJECTIVE BOX
Patient is a 58y old  Female who presents with a chief complaint of Difficulty ambulating (30 Jan 2023 14:02)      SUBJECTIVE / OVERNIGHT EVENTS:  No acute events overnight. Pt has no new complaints. Pain adequately controlled. Pt reports having some cramping in LLE.     Case management and myself discussed dispo with patient and daughter at bedside. PT rec FARIBA but pt is very reluctant and is only willing to go to rehab if her daughter could be at her side 24/7. Also per , selection of facilities may be limited as pt is taking methadone for pain control. Pt and daughter prefer discharge home. CM discussed limitations in resources available to her with home care but pt and daughter adamant about going home. Daughter is willing to assume full responsibility for her mother at home    ADDITIONAL REVIEW OF SYSTEMS:    MEDICATIONS  (STANDING):  acetaminophen     Tablet .. 975 milliGRAM(s) Oral every 8 hours  bisacodyl 5 milliGRAM(s) Oral at bedtime  chlorhexidine 2% Cloths 1 Application(s) Topical daily  cholecalciferol 400 Unit(s) Oral daily  dextrose 5%. 1000 milliLiter(s) (50 mL/Hr) IV Continuous <Continuous>  dextrose 5%. 1000 milliLiter(s) (100 mL/Hr) IV Continuous <Continuous>  dextrose 50% Injectable 25 Gram(s) IV Push once  dextrose 50% Injectable 12.5 Gram(s) IV Push once  dextrose 50% Injectable 25 Gram(s) IV Push once  enoxaparin Injectable 30 milliGRAM(s) SubCutaneous every 24 hours  gabapentin 300 milliGRAM(s) Oral three times a day  glucagon  Injectable 1 milliGRAM(s) IntraMuscular once  influenza   Vaccine 0.5 milliLiter(s) IntraMuscular once  insulin glargine Injectable (LANTUS) 4 Unit(s) SubCutaneous at bedtime  insulin lispro (ADMELOG) corrective regimen sliding scale   SubCutaneous three times a day before meals  insulin lispro (ADMELOG) corrective regimen sliding scale   SubCutaneous at bedtime  insulin lispro Injectable (ADMELOG) 1 Unit(s) SubCutaneous three times a day before meals  lidocaine   4% Patch 1 Patch Transdermal daily  methadone    Tablet 10 milliGRAM(s) Oral every 12 hours  montelukast 10 milliGRAM(s) Oral at bedtime  multivitamin 1 Tablet(s) Oral daily  ondansetron   Disintegrating Tablet 4 milliGRAM(s) Oral every 8 hours  pantoprazole    Tablet 40 milliGRAM(s) Oral before breakfast  polyethylene glycol 3350 17 Gram(s) Oral two times a day  povidone iodine 5% Nasal Swab 1 Application(s) Both Nostrils once  senna 2 Tablet(s) Oral at bedtime    MEDICATIONS  (PRN):  dextrose Oral Gel 15 Gram(s) Oral once PRN Blood Glucose LESS THAN 70 milliGRAM(s)/deciliter  meclizine 12.5 milliGRAM(s) Oral every 6 hours PRN Dizziness  naloxone 1 mG/mL Injection for Intranasal Use 0.4 milliGRAM(s) IntraNasal every 3 minutes PRN overdose  oxyCODONE    IR 5 milliGRAM(s) Oral every 4 hours PRN Moderate Pain (4 - 6)  oxyCODONE    IR 10 milliGRAM(s) Oral every 4 hours PRN Severe Pain (7 - 10)  prochlorperazine   Injectable 5 milliGRAM(s) IV Push every 8 hours PRN refractory nausea      CAPILLARY BLOOD GLUCOSE      POCT Blood Glucose.: 152 mg/dL (30 Jan 2023 12:34)  POCT Blood Glucose.: 127 mg/dL (30 Jan 2023 09:01)  POCT Blood Glucose.: 145 mg/dL (29 Jan 2023 22:41)  POCT Blood Glucose.: 116 mg/dL (29 Jan 2023 19:22)    I&O's Summary    29 Jan 2023 07:01  -  30 Jan 2023 07:00  --------------------------------------------------------  IN: 0 mL / OUT: 700 mL / NET: -700 mL        PHYSICAL EXAM:  Vital Signs Last 24 Hrs  T(C): 36.4 (30 Jan 2023 14:57), Max: 37.1 (29 Jan 2023 22:57)  T(F): 97.5 (30 Jan 2023 14:57), Max: 98.7 (29 Jan 2023 22:57)  HR: 84 (30 Jan 2023 14:57) (71 - 90)  BP: 110/65 (30 Jan 2023 14:57) (96/63 - 111/63)  BP(mean): --  RR: 17 (30 Jan 2023 14:57) (17 - 18)  SpO2: 100% (30 Jan 2023 14:57) (100% - 100%)    Parameters below as of 30 Jan 2023 14:57  Patient On (Oxygen Delivery Method): room air      GENERAL: NAD, well-developed, lying in bed;  HEAD:  Atraumatic, Normocephalic, MMM  CHEST/LUNG: No use of accessory muscles, CTAB, breathing non-labored  COR: RR, no mrcg  ABD: Soft, ND/NT, +BS  PSYCH: AAOx3  NEUROLOGY: CN II-XII grossly intact, moving all extremities  SKIN: No rashes or lesions  EXT: wwp, no cce, surgical site on left with dressing, c/d/i     LABS:                        10.5   5.38  )-----------( 101      ( 30 Jan 2023 07:15 )             31.8     01-30    134<L>  |  100  |  8   ----------------------------<  165<H>  3.6   |  27  |  0.42<L>    Ca    8.6      30 Jan 2023 07:15  Phos  3.5     01-30  Mg     1.70     01-30                  RADIOLOGY & ADDITIONAL TESTS:  Results Reviewed:   Imaging Personally Reviewed:  Electrocardiogram Personally Reviewed:    COORDINATION OF CARE:  Care Discussed with Consultants/Other Providers [Y/N]:  Prior or Outpatient Records Reviewed [Y/N]:

## 2023-01-30 NOTE — PROGRESS NOTE ADULT - SUBJECTIVE AND OBJECTIVE BOX
ORTHO PROGRESS NOTE     Pt seen and examined at bedside, denies SOB, CP, Dizziness. N/V/D /HA.  No significant overnight events. Pain well controlled.    Vital Signs Last 24 Hrs  T(C): 37.1 (29 Jan 2023 22:57), Max: 37.1 (29 Jan 2023 22:57)  T(F): 98.7 (29 Jan 2023 22:57), Max: 98.7 (29 Jan 2023 22:57)  HR: 90 (29 Jan 2023 22:57) (78 - 90)  BP: 111/63 (29 Jan 2023 22:57) (111/58 - 111/63)  BP(mean): --  RR: 17 (29 Jan 2023 22:57) (17 - 17)  SpO2: 100% (29 Jan 2023 22:57) (100% - 100%)    Parameters below as of 29 Jan 2023 22:57  Patient On (Oxygen Delivery Method): room air        Gen: NAD, alert and oriented  Resp: Unlabored breathing  LLE: Dressing c/d/i       SILT DP/SP/ Denise/Saph/tib       5/5 EHL 5/5 FHL 5/5 TA 5/5 Gastroc 5/5 IP        DP+,        soft compartments, no calf ttp,       Labs:  CBC Full  -  ( 29 Jan 2023 06:11 )  WBC Count : 6.11 K/uL  RBC Count : 4.30 M/uL  Hemoglobin : 11.3 g/dL  Hematocrit : 34.1 %  Platelet Count - Automated : 102 K/uL  Mean Cell Volume : 79.3 fL  Mean Cell Hemoglobin : 26.3 pg  Mean Cell Hemoglobin Concentration : 33.1 gm/dL  Auto Neutrophil # : x  Auto Lymphocyte # : x  Auto Monocyte # : x  Auto Eosinophil # : x  Auto Basophil # : x  Auto Neutrophil % : x  Auto Lymphocyte % : x  Auto Monocyte % : x  Auto Eosinophil % : x  Auto Basophil % : x      01-29    133<L>  |  101  |  7   ----------------------------<  103<H>  3.9   |  28  |  0.50    Ca    8.7      29 Jan 2023 06:11  Phos  3.1     01-29  Mg     1.90     01-29        A/P  Pt is a 58y Female s/p L hip florina    - Pain control/ Analgesia  - DVT ppx  -PT/OT   -WBAT  -Rest of care per primary team

## 2023-01-30 NOTE — PROGRESS NOTE ADULT - PROBLEM SELECTOR PLAN 8
DVT ppx: Lovenox on hold for concern for   L hip/thigh hematomas and drop in H/H, c/w venodynes   PT recommending FARIBA, however pt wants to go home  code: full code  Updated pt's daughter on 1/28 and   at bedside on 1/29, all questions answered   case d/w ACP

## 2023-01-30 NOTE — PROGRESS NOTE ADULT - PROBLEM SELECTOR PLAN 4
- Patient reports having dizziness daily since prior to admission  - Dizziness improving with decreased oxycodone dose ; d/c ATC oxycodone doses as above as pain improving

## 2023-01-30 NOTE — PROGRESS NOTE ADULT - PROBLEM SELECTOR PLAN 1
- XRAY - Displaced pathologic left femoral neck fracture. No dislocations or additional fractures.  - Ortho recommendations appreciated - s/p left hip hemiarthroplasty.   - pain management as below

## 2023-01-30 NOTE — PROGRESS NOTE ADULT - PROBLEM SELECTOR PLAN 5
Patient discussed during oncology interdisciplinary rounds   Symptom management recommendations discussed with primary team   Thank you for allowing us to participate in your patient's care. Please page [phone] for any questions/concerns.

## 2023-01-31 DIAGNOSIS — Z79.899 OTHER LONG TERM (CURRENT) DRUG THERAPY: ICD-10-CM

## 2023-01-31 LAB
ANION GAP SERPL CALC-SCNC: 9 MMOL/L — SIGNIFICANT CHANGE UP (ref 7–14)
BUN SERPL-MCNC: 9 MG/DL — SIGNIFICANT CHANGE UP (ref 7–23)
CALCIUM SERPL-MCNC: 9.3 MG/DL — SIGNIFICANT CHANGE UP (ref 8.4–10.5)
CHLORIDE SERPL-SCNC: 99 MMOL/L — SIGNIFICANT CHANGE UP (ref 98–107)
CO2 SERPL-SCNC: 28 MMOL/L — SIGNIFICANT CHANGE UP (ref 22–31)
CREAT SERPL-MCNC: 0.5 MG/DL — SIGNIFICANT CHANGE UP (ref 0.5–1.3)
EGFR: 109 ML/MIN/1.73M2 — SIGNIFICANT CHANGE UP
GLUCOSE SERPL-MCNC: 133 MG/DL — HIGH (ref 70–99)
HCT VFR BLD CALC: 37.2 % — SIGNIFICANT CHANGE UP (ref 34.5–45)
HGB BLD-MCNC: 12 G/DL — SIGNIFICANT CHANGE UP (ref 11.5–15.5)
MAGNESIUM SERPL-MCNC: 1.9 MG/DL — SIGNIFICANT CHANGE UP (ref 1.6–2.6)
MCHC RBC-ENTMCNC: 25.6 PG — LOW (ref 27–34)
MCHC RBC-ENTMCNC: 32.3 GM/DL — SIGNIFICANT CHANGE UP (ref 32–36)
MCV RBC AUTO: 79.5 FL — LOW (ref 80–100)
NRBC # BLD: 0 /100 WBCS — SIGNIFICANT CHANGE UP (ref 0–0)
NRBC # FLD: 0 K/UL — SIGNIFICANT CHANGE UP (ref 0–0)
PHOSPHATE SERPL-MCNC: 3.6 MG/DL — SIGNIFICANT CHANGE UP (ref 2.5–4.5)
PLATELET # BLD AUTO: 130 K/UL — LOW (ref 150–400)
POTASSIUM SERPL-MCNC: 4.3 MMOL/L — SIGNIFICANT CHANGE UP (ref 3.5–5.3)
POTASSIUM SERPL-SCNC: 4.3 MMOL/L — SIGNIFICANT CHANGE UP (ref 3.5–5.3)
RBC # BLD: 4.68 M/UL — SIGNIFICANT CHANGE UP (ref 3.8–5.2)
RBC # FLD: 16 % — HIGH (ref 10.3–14.5)
SODIUM SERPL-SCNC: 136 MMOL/L — SIGNIFICANT CHANGE UP (ref 135–145)
WBC # BLD: 5.15 K/UL — SIGNIFICANT CHANGE UP (ref 3.8–10.5)
WBC # FLD AUTO: 5.15 K/UL — SIGNIFICANT CHANGE UP (ref 3.8–10.5)

## 2023-01-31 PROCEDURE — 99232 SBSQ HOSP IP/OBS MODERATE 35: CPT

## 2023-01-31 PROCEDURE — 99233 SBSQ HOSP IP/OBS HIGH 50: CPT

## 2023-01-31 RX ADMIN — SENNA PLUS 2 TABLET(S): 8.6 TABLET ORAL at 22:50

## 2023-01-31 RX ADMIN — ONDANSETRON 4 MILLIGRAM(S): 8 TABLET, FILM COATED ORAL at 05:42

## 2023-01-31 RX ADMIN — Medication 5 MILLIGRAM(S): at 22:50

## 2023-01-31 RX ADMIN — Medication 975 MILLIGRAM(S): at 21:57

## 2023-01-31 RX ADMIN — OXYCODONE HYDROCHLORIDE 5 MILLIGRAM(S): 5 TABLET ORAL at 18:18

## 2023-01-31 RX ADMIN — GABAPENTIN 300 MILLIGRAM(S): 400 CAPSULE ORAL at 21:58

## 2023-01-31 RX ADMIN — ENOXAPARIN SODIUM 30 MILLIGRAM(S): 100 INJECTION SUBCUTANEOUS at 18:24

## 2023-01-31 RX ADMIN — ONDANSETRON 4 MILLIGRAM(S): 8 TABLET, FILM COATED ORAL at 14:31

## 2023-01-31 RX ADMIN — Medication 1 UNIT(S): at 18:25

## 2023-01-31 RX ADMIN — POLYETHYLENE GLYCOL 3350 17 GRAM(S): 17 POWDER, FOR SOLUTION ORAL at 05:41

## 2023-01-31 RX ADMIN — CHLORHEXIDINE GLUCONATE 1 APPLICATION(S): 213 SOLUTION TOPICAL at 14:30

## 2023-01-31 RX ADMIN — Medication 975 MILLIGRAM(S): at 15:29

## 2023-01-31 RX ADMIN — Medication 975 MILLIGRAM(S): at 14:29

## 2023-01-31 RX ADMIN — MONTELUKAST 10 MILLIGRAM(S): 4 TABLET, CHEWABLE ORAL at 21:57

## 2023-01-31 RX ADMIN — Medication 975 MILLIGRAM(S): at 05:42

## 2023-01-31 RX ADMIN — Medication 975 MILLIGRAM(S): at 06:42

## 2023-01-31 RX ADMIN — Medication 1 UNIT(S): at 09:06

## 2023-01-31 RX ADMIN — PANTOPRAZOLE SODIUM 40 MILLIGRAM(S): 20 TABLET, DELAYED RELEASE ORAL at 05:42

## 2023-01-31 RX ADMIN — Medication 400 UNIT(S): at 14:30

## 2023-01-31 RX ADMIN — Medication 2: at 09:05

## 2023-01-31 RX ADMIN — Medication 1 TABLET(S): at 14:30

## 2023-01-31 RX ADMIN — ONDANSETRON 4 MILLIGRAM(S): 8 TABLET, FILM COATED ORAL at 21:57

## 2023-01-31 RX ADMIN — GABAPENTIN 300 MILLIGRAM(S): 400 CAPSULE ORAL at 05:42

## 2023-01-31 RX ADMIN — METHADONE HYDROCHLORIDE 10 MILLIGRAM(S): 40 TABLET ORAL at 18:24

## 2023-01-31 RX ADMIN — METHADONE HYDROCHLORIDE 10 MILLIGRAM(S): 40 TABLET ORAL at 05:41

## 2023-01-31 RX ADMIN — INSULIN GLARGINE 4 UNIT(S): 100 INJECTION, SOLUTION SUBCUTANEOUS at 22:50

## 2023-01-31 RX ADMIN — OXYCODONE HYDROCHLORIDE 5 MILLIGRAM(S): 5 TABLET ORAL at 17:18

## 2023-01-31 RX ADMIN — POLYETHYLENE GLYCOL 3350 17 GRAM(S): 17 POWDER, FOR SOLUTION ORAL at 18:25

## 2023-01-31 RX ADMIN — GABAPENTIN 300 MILLIGRAM(S): 400 CAPSULE ORAL at 14:30

## 2023-01-31 NOTE — PROGRESS NOTE ADULT - PROBLEM SELECTOR PLAN 1
P/w 1mo progressive LLE pain and difficulty ambulating x1wk; likely in setting of known L femur metastatic disease  - pain control per palliative: 10 mg methadone q12h, oxycodone 15mg q4h PRN for mod pain, 20 mg q4h for severe pain  - bowel regimen while on opioids  - Completed course of RT on 1/24.  - XR hip with L pathological femoral neck fracture; orthopedics following, s/p L hip hemiarthroplasty on 1/26

## 2023-01-31 NOTE — PROGRESS NOTE ADULT - PROBLEM SELECTOR PLAN 4
- Patient reports having dizziness daily since prior to admission  - Dizziness improving with decreased oxycodone dose ; d/c ATC oxycodone doses as above as pain improving Please prescribe Naloxone for here and upon discharge. Patients with 50mg OME have 4x risk of OD and those with 100mg OME have 9x risk ([email or web address].[email or web address])    Inpatient:  Naoloxone 0.1mg IV q3 minutes PRN  for respiratory depression (RR < 11) or oversedation due to opioids. (Max - 4 doses)  Upon discharge: Naloxone 4mg/0.1 ml nasal spray, Spray 0.1mL into one nostril. Repeat with second spray into other nostril after 2-3 minutes if no or minimal response. ([email or web address]). Please be sure the patient's pharmacy has the medication, otherwise, be sure there is a pharmacy where the patient can get the Naloxone inhaled.    Please provide patient education prior to discharge. Resource: https://www.health.[place].gov/diseases/aids/general/opioid_overdose_prevention/overdose_facts.htm

## 2023-01-31 NOTE — PROGRESS NOTE ADULT - SUBJECTIVE AND OBJECTIVE BOX
ORTHO PROGRESS NOTE     Pt seen and examined at bedside. Daughter at bedside. No significant overnight events. Pain well controlled. Walked a few steps with PT yesterday    Vital Signs Last 24 Hrs  T(C): 36.8 (31 Jan 2023 05:20), Max: 37.1 (30 Jan 2023 19:22)  T(F): 98.3 (31 Jan 2023 05:20), Max: 98.8 (30 Jan 2023 19:22)  HR: 79 (31 Jan 2023 05:20) (71 - 84)  BP: 97/62 (31 Jan 2023 05:20) (96/63 - 110/65)  BP(mean): --  RR: 18 (31 Jan 2023 05:20) (17 - 18)  SpO2: 99% (31 Jan 2023 05:20) (98% - 100%)    Parameters below as of 31 Jan 2023 05:20  Patient On (Oxygen Delivery Method): room air      Gen: NAD, alert and oriented  Resp: Unlabored breathing  LLE: Dressing c/d/i       SILT DP/SP/ Denise/Saph/tib       5/5 EHL 5/5 FHL 5/5 TA 5/5 Gastroc 5/5 IP        DP+,        soft compartments            A/P  Pt is a 58y Female s/p L hip florina. H/H and hematomas stable.    - Pain control/ Analgesia  - DVT ppx  -PT/OT   -WBAT  - Dispo: [facility]  -Rest of care per primary team    Orthopaedic Surgery  [facility] [place] [id number]  [facility]        [id number]  [facility]  [id number]/ [phone]

## 2023-01-31 NOTE — PROGRESS NOTE ADULT - PROBLEM SELECTOR PLAN 5
Patient discussed during oncology interdisciplinary rounds   Symptom management recommendations discussed with primary team   Thank you for allowing us to participate in your patient's care. Please page [phone] for any questions/concerns. Patient discussed during oncology interdisciplinary rounds   Symptom management recommendations discussed with primary team  Sign out provided to outpatient palliative care team    Thank you for allowing us to participate in your patient's care. Please page [phone] for any questions/concerns.

## 2023-01-31 NOTE — PROGRESS NOTE ADULT - ASSESSMENT
56 yo F with metastatic pancreatic CA (mets to L spine, L femur). She was first diagnosed with pancreatic cancer in 2018 and then in 1/2019 underwent a Whipple for Stage III pancreatic cancer, T3N1 with 1/24 LN positive. She did well until 7/2022 when she was found to have recurrent disease and was started on chemo with Abraxane and Gemzar. She tolerated chemotherapy well overall. She is now admitted with LLE pain x 1mo with difficulty ambulating x1wk. CT shows no evidence of metastatic disease at this time. Bone scan and MRI show evidence of progression of cancer with metastatic lesions within the left femoral neck and superior articular facet of L5. The lesion in the left femoral neck likely places the patient at increased risk for pathologic fracture.    Patient s/p  5 fractions of RT to the left femur however with increased left pain  1/25- XR hip with L pathological femoral neck fracture; orthopedics re consulted  1/26 s/p L hemiarthoplasty  1/27 pt with hypotension stat CT left  hip revealing Status post recent left hip arthroplasty with postsurgical changes   including left adductor, vastus lateralis and gluteus nura muscle   hematomas with small locules of gas.  To followup Ortho for further recommendations  -GOC: They are interested in continuing treatment and understand that as she has had POD on the current chemo she should be evaluated for a new systemic therapy following RT.   -She is a good candidate for additional chemotherapy.  -No plans for inpatient chemotherapy  -Appreciate  Palliative Care consult for pain management  -Monitor CBC daily, pt counts may go down in the setting of recent chemo  -PT rec rehab, no plans for chemo while admitted in rehab.  -Patient to followup with her primary oncologist Dr. Katty Navarro ([facility]) upon discharge  -C/w Supportive care, pain control, Nutrition, PT, DVT ppx  -Oncology will continue to follow with you      Case discussed with Dr. Roseanne CASTLE  Oncology Physician Assistant  [facility]the[facility] DICKENS/KAREN [facility]  Pager [phone] also available on Teams    If before 8am/after 5pm or on weekends please page On-call Oncology Fellow

## 2023-01-31 NOTE — CHART NOTE - NSCHARTNOTEFT_GEN_A_CORE
58F PMH of pancreatic cancer (mets to L spine, L femur; Cong, chemo ambraxane/gemzar 12/27, radiation ?12/15), T2DM, GERD, asthma, and HTN p/w LLE pain x 1 month with difficulty ambulating x1 week. Likely in the setting of known metastatic disease. Admitted for PT eval and pain control. s/p L Hemiarthroplasty. Pt can self-propel. Cannot perform ADL's with the use of a walker, cane or crutched due to debilitation 2/2 surgery and metastatic disease. Wheelchair necessary for patient to perform ADL's within home. Caregiver available, and willing to provide assistance with wheel chair to be used to move about in the home.

## 2023-01-31 NOTE — PROGRESS NOTE ADULT - SUBJECTIVE AND OBJECTIVE BOX
Patient is a 58y old  Female who presents with a chief complaint of Difficulty ambulating (31 Jan 2023 14:00)      SUBJECTIVE / OVERNIGHT EVENTS: No acute events overnight. Pt has no new complaints. Pt and family now in agreement with rehab placement     ADDITIONAL REVIEW OF SYSTEMS:    MEDICATIONS  (STANDING):  acetaminophen     Tablet .. 975 milliGRAM(s) Oral every 8 hours  bisacodyl 5 milliGRAM(s) Oral at bedtime  chlorhexidine 2% Cloths 1 Application(s) Topical daily  cholecalciferol 400 Unit(s) Oral daily  dextrose 5%. 1000 milliLiter(s) (50 mL/Hr) IV Continuous <Continuous>  dextrose 5%. 1000 milliLiter(s) (100 mL/Hr) IV Continuous <Continuous>  dextrose 50% Injectable 25 Gram(s) IV Push once  dextrose 50% Injectable 12.5 Gram(s) IV Push once  dextrose 50% Injectable 25 Gram(s) IV Push once  enoxaparin Injectable 30 milliGRAM(s) SubCutaneous every 24 hours  gabapentin 300 milliGRAM(s) Oral three times a day  glucagon  Injectable 1 milliGRAM(s) IntraMuscular once  influenza   Vaccine 0.5 milliLiter(s) IntraMuscular once  insulin glargine Injectable (LANTUS) 4 Unit(s) SubCutaneous at bedtime  insulin lispro (ADMELOG) corrective regimen sliding scale   SubCutaneous three times a day before meals  insulin lispro (ADMELOG) corrective regimen sliding scale   SubCutaneous at bedtime  insulin lispro Injectable (ADMELOG) 1 Unit(s) SubCutaneous three times a day before meals  lidocaine   4% Patch 1 Patch Transdermal daily  methadone    Tablet 10 milliGRAM(s) Oral every 12 hours  montelukast 10 milliGRAM(s) Oral at bedtime  multivitamin 1 Tablet(s) Oral daily  ondansetron   Disintegrating Tablet 4 milliGRAM(s) Oral every 8 hours  pantoprazole    Tablet 40 milliGRAM(s) Oral before breakfast  polyethylene glycol 3350 17 Gram(s) Oral two times a day  povidone iodine 5% Nasal Swab 1 Application(s) Both Nostrils once  senna 2 Tablet(s) Oral at bedtime    MEDICATIONS  (PRN):  dextrose Oral Gel 15 Gram(s) Oral once PRN Blood Glucose LESS THAN 70 milliGRAM(s)/deciliter  meclizine 12.5 milliGRAM(s) Oral every 6 hours PRN Dizziness  naloxone 1 mG/mL Injection for Intranasal Use 0.4 milliGRAM(s) IntraNasal every 3 minutes PRN overdose  oxyCODONE    IR 5 milliGRAM(s) Oral every 4 hours PRN Moderate Pain (4 - 6)  oxyCODONE    IR 10 milliGRAM(s) Oral every 4 hours PRN Severe Pain (7 - 10)  prochlorperazine   Injectable 5 milliGRAM(s) IV Push every 8 hours PRN refractory nausea      CAPILLARY BLOOD GLUCOSE      POCT Blood Glucose.: 89 mg/dL (31 Jan 2023 12:39)  POCT Blood Glucose.: 158 mg/dL (31 Jan 2023 08:45)  POCT Blood Glucose.: 156 mg/dL (30 Jan 2023 21:58)  POCT Blood Glucose.: 159 mg/dL (30 Jan 2023 18:03)    I&O's Summary    30 Jan 2023 07:01  -  31 Jan 2023 07:00  --------------------------------------------------------  IN: 300 mL / OUT: 1100 mL / NET: -800 mL        PHYSICAL EXAM:  Vital Signs Last 24 Hrs  T(C): 36.8 (31 Jan 2023 09:40), Max: 37.1 (30 Jan 2023 19:22)  T(F): 98.2 (31 Jan 2023 09:40), Max: 98.8 (30 Jan 2023 19:22)  HR: 76 (31 Jan 2023 09:40) (72 - 84)  BP: 95/55 (31 Jan 2023 09:40) (95/55 - 110/65)  BP(mean): --  RR: 17 (31 Jan 2023 09:40) (17 - 18)  SpO2: 100% (31 Jan 2023 09:40) (98% - 100%)    Parameters below as of 31 Jan 2023 09:40  Patient On (Oxygen Delivery Method): room air      GENERAL: NAD, well-developed, lying in bed;  HEAD:  Atraumatic, Normocephalic, MMM  CHEST/LUNG: No use of accessory muscles, CTAB, breathing non-labored  COR: RR, no mrcg  ABD: Soft, ND/NT, +BS  PSYCH: AAOx3  NEUROLOGY: CN II-XII grossly intact, moving all extremities  SKIN: No rashes or lesions  EXT: wwp, no cce, surgical site on left with dressing, c/d/i         LABS:                        12.0   5.15  )-----------( 130      ( 31 Jan 2023 06:18 )             37.2     01-31    136  |  99  |  9   ----------------------------<  133<H>  4.3   |  28  |  0.50    Ca    9.3      31 Jan 2023 06:18  Phos  3.6     01-31  Mg     1.90     01-31                  RADIOLOGY & ADDITIONAL TESTS:  Results Reviewed:   Imaging Personally Reviewed:  Electrocardiogram Personally Reviewed:    COORDINATION OF CARE:  Care Discussed with Consultants/Other Providers [Y/N]:  Prior or Outpatient Records Reviewed [Y/N]:

## 2023-01-31 NOTE — PROGRESS NOTE ADULT - NS ATTEST RISK PROBLEM GEN_ALL_CORE FT
In the setting of controlled substance administration, clinical monitoring required for side effects such as respiratory depression, constipation and opioid induced neurotoxicity. In the setting of controlled substance administration with methadone, clinical monitoring required for side effects such as respiratory depression, constipation and opioid induced neurotoxicity.

## 2023-01-31 NOTE — PROGRESS NOTE ADULT - SUBJECTIVE AND OBJECTIVE BOX
72 y.o. with recent admission for acute RLE ischemia on xarelto, ASA, and cilostazol with recent admit for GI bleed requiring multiple blood transfusions, presents with multiple episodes of melena and acute anemia with Hgb 6.7. INR 3.2.  - HDS, with mild tachycardia and hypotension, improving   - s/p 1 unit pRBCs with expected correction.    GI consulted, recs reviewed and d/w GI fellow.  Now s/p push enteroscopy (results to be reported)  Normal endoscopy. Some erythema in the duodenum.    Plan: colonoscopy Monday. Hold Xarelto, ok to restart ASA 81 for limb ischemia/PVD.  Continue Protonix 40mg IV BID until colonoscopy.  Daily CBC, transfuse to keep Hgb>7   INTERVAL HPI/OVERNIGHT EVENTS:  In past 24 hours, received 0 prn doses of Oxy     SUBJECTIVE AND OBJECTIVE:  Patient declined Sinhala ; preferred for daughter at bedside to translate.   Patient with improvement of pain with dizziness improving a bit with decreased dose of ATC oxycodone dose.   Patient and daughter agree that can d/c ATC oxycodone as pain improving and dizziness improves with decreased oxycodone doses   Denies nausea/ dyspnea. Last bowel movement today.     Allergies    No Known Allergies    Intolerances      MEDICATIONS  (STANDING):  acetaminophen     Tablet .. 975 milliGRAM(s) Oral every 8 hours  bisacodyl 5 milliGRAM(s) Oral at bedtime  chlorhexidine 2% Cloths 1 Application(s) Topical daily  cholecalciferol 400 Unit(s) Oral daily  dextrose 5%. 1000 milliLiter(s) (50 mL/Hr) IV Continuous <Continuous>  dextrose 5%. 1000 milliLiter(s) (100 mL/Hr) IV Continuous <Continuous>  dextrose 50% Injectable 25 Gram(s) IV Push once  dextrose 50% Injectable 12.5 Gram(s) IV Push once  dextrose 50% Injectable 25 Gram(s) IV Push once  enoxaparin Injectable 30 milliGRAM(s) SubCutaneous every 24 hours  gabapentin 300 milliGRAM(s) Oral three times a day  glucagon  Injectable 1 milliGRAM(s) IntraMuscular once  influenza   Vaccine 0.5 milliLiter(s) IntraMuscular once  insulin glargine Injectable (LANTUS) 4 Unit(s) SubCutaneous at bedtime  insulin lispro (ADMELOG) corrective regimen sliding scale   SubCutaneous three times a day before meals  insulin lispro (ADMELOG) corrective regimen sliding scale   SubCutaneous at bedtime  insulin lispro Injectable (ADMELOG) 1 Unit(s) SubCutaneous three times a day before meals  lidocaine   4% Patch 1 Patch Transdermal daily  methadone    Tablet 10 milliGRAM(s) Oral every 12 hours  montelukast 10 milliGRAM(s) Oral at bedtime  multivitamin 1 Tablet(s) Oral daily  ondansetron   Disintegrating Tablet 4 milliGRAM(s) Oral every 8 hours  pantoprazole    Tablet 40 milliGRAM(s) Oral before breakfast  polyethylene glycol 3350 17 Gram(s) Oral two times a day  povidone iodine 5% Nasal Swab 1 Application(s) Both Nostrils once  senna 2 Tablet(s) Oral at bedtime    MEDICATIONS  (PRN):  dextrose Oral Gel 15 Gram(s) Oral once PRN Blood Glucose LESS THAN 70 milliGRAM(s)/deciliter  meclizine 12.5 milliGRAM(s) Oral every 6 hours PRN Dizziness  naloxone 1 mG/mL Injection for Intranasal Use 0.4 milliGRAM(s) IntraNasal every 3 minutes PRN overdose  oxyCODONE    IR 5 milliGRAM(s) Oral every 4 hours PRN Moderate Pain (4 - 6)  oxyCODONE    IR 10 milliGRAM(s) Oral every 4 hours PRN Severe Pain (7 - 10)  prochlorperazine   Injectable 5 milliGRAM(s) IV Push every 8 hours PRN refractory nausea      ITEMS UNCHECKED ARE NOT PRESENT    PRESENT SYMPTOMS: [ ]Unable to self-report due to altered mental status- see [ ] CPOT [ ] PAINADS [ ] RDOS  Source if other than patient:  [ ]Family   [ ]Team     Pain: [ x]yes [ ]no .  QOL impact - mild-moderate  Location - left thigh                     Aggravating factors - movement   Quality - tingling   Radiation - left lower extremity and lower back  Timing- constant with intermittent worsening episodes   Severity (0-10 scale): 10  Minimal acceptable level / Pain Goal (0-10 scale):  7-8    Dyspnea:                           [ ]Mild [ ]Moderate [ ]Severe  Anxiety:                             [ ]Mild [ ]Moderate [ ]Severe  Agitation:                          [ ]Mild [ ]Moderate [ ]Severe  Fatigue:                             [ ]Mild [ ]Moderate [ ]Severe  Nausea:                             [ ]Mild [ ]Moderate [ ]Severe  Loss of appetite:              [ ]Mild [ ]Moderate [ ]Severe  Constipation:                   [ ]Mild [ ]Moderate [ ]Severe  Diarrhea:                          [ ]Mild [ ]Moderate [ ]Severe    CPOT:    https://www.[facility].org/getattachment/[email or web address]/Critical-Care-Pain-Observation-Tool-(CPOT)    PCSSQ[Palliative Care Spiritual Screening Question]   Severity (0-10):  Score of 4 or > indicate consideration of Chaplaincy referral.  Chaplaincy Referral: [ ] yes [ ] refused [ ] following [x ] deferred    Caregiver Korbel? : [ ] yes [ ] no [ ] Declined [x ] Deferred              Social work referral [ ] Patient & Family Centered Care Referral [ ]     Anticipatory Grief present?:  [x ] yes [ ] no  [ ] Deferred                  Social work referral [x ] Chaplaincy Referral[ ]    Other Symptoms:  [ x]All other review of systems negative     PHYSICAL EXAM:  Vital Signs Last 24 Hrs  T(C): 36.7 (30 Jan 2023 10:47), Max: 37.1 (29 Jan 2023 22:57)  T(F): 98 (30 Jan 2023 10:47), Max: 98.7 (29 Jan 2023 22:57)  HR: 74 (30 Jan 2023 10:47) (71 - 90)  BP: 96/63 (30 Jan 2023 10:47) (96/63 - 111/63)  BP(mean): --  RR: 18 (30 Jan 2023 10:47) (17 - 18)  SpO2: 100% (30 Jan 2023 10:47) (100% - 100%)    Parameters below as of 30 Jan 2023 10:47  Patient On (Oxygen Delivery Method): room air        GENERAL:  [x ]Alert  [x ]Oriented x 3  [ ]Lethargic  [ ]Cachexia  [ ]Unarousable  [x ]Verbal  [ ]Non-Verbal    Behavioral:   [ ] Anxiety  [ ] Delirium [ ] Agitation [ x] Calm  [ ] Other  HEENT:  [ x]Normal  [ ] Temporal Wasting  [ ]Dry mouth   [ ]ET Tube/Trach  [ ]Oral lesions  [ ] Mucositis  PULMONARY:   [ x]Clear [ ]Tachypnea  [ ]Audible excessive secretions   [ ]Rhonchi        [ ]Right [ ]Left [ ]Bilateral  [ ]Crackles        [ ]Right [ ]Left [ ]Bilateral  [ ]Wheezing     [ ]Right [ ]Left [ ]Bilateral  [ ]Diminished breath sounds [ ]right [ ]left [ ]bilateral  CARDIOVASCULAR:    [ x]Regular [ ]Irregular [ ]Tachy  [ ]Dmitry [ ]Murmur [ ]Other  GASTROINTESTINAL:  [ x]Soft  [ ]Distended   [ ]+BS  [x ]Non tender [ ]Tender  [ ]PEG [ ]OGT/ NGT  Last BM: 1/30  GENITOURINARY:   [x ]Normal [ ] Incontinent   [ ]Oliguria/Anuria   [ ]Swanson  MUSCULOSKELETAL:   [ ]Normal   [x ]Weakness with limited left lower extremity movement s/p left hip surgery  [ ]Bed/Wheelchair bound [ ]Edema  [  ] amputation  [  ] contraction  NEUROLOGIC:   [ x]No focal deficits  [ ]Cognitive impairment  [ ]Dysphagia [ ]Dysarthria [ ]Paresis [ ]Other   SKIN: See Nursing Skin Assessment for further details  [x ]Normal    [ ]Rash  [ ]Pressure ulcer(s)       Present on admission [ ]y [ ]n   [  ]  Wound    [  ] hyperpigmentation      CRITICAL CARE:  [ ]Shock Present  [ ]Septic [ ]Cardiogenic [ ]Neurologic [ ]Hypovolemic  [ ]Vasopressors [ ]Inotropes  [ ]Respiratory failure present [ ]Mechanical Ventilation [ ]Non-invasive ventilatory support [ ]High-Flow   [ ]Acute  [ ]Chronic [ ]Hypoxic  [ ]Hypercarbic [ ]Other  [ ]Other organ failure     LABS:                        10.5   5.38  )-----------( 101      ( 30 Jan 2023 07:15 )             31.8       01-30    134<L>  |  100  |  8   ----------------------------<  165<H>  3.6   |  27  |  0.42<L>    Ca    8.6      30 Jan 2023 07:15  Phos  3.5     01-30  Mg     1.70     01-30        RADIOLOGY & ADDITIONAL STUDIES: reviewed     Protein Calorie Malnutrition Present: [ ]mild [ ]moderate [ ]severe [ ]underweight [ ]morbid obesity  https://www.andeal.org/vault/3810/web/files/ONC/Table_Clinical%20Characteristics%20to%20Document%20Malnutrition-White%20JV%20et%20al%202012.pdf    Height (cm): 149 (12-29-22 @ 22:41), 149 (12-27-22 @ 16:21), 147.3 (10-22-22 @ 08:57)  Weight (kg): 43.5 (12-27-22 @ 16:21), 47 (10-22-22 @ 08:57), 45.9 (07-20-22 @ 11:51)  BMI (kg/m2): 19.6 (12-29-22 @ 22:41), 19.6 (12-27-22 @ 16:21), 21.7 (10-22-22 @ 08:57)    [ ]PPSV2 < or = 30%  [ ]significant weight loss [ ]poor nutritional intake [ ]anasarca   [ ]Artificial Nutrition    REFERRALS:   [ ]Chaplaincy  [ ]Hospice  [ ]Child Life  [ ]Social Work  [ x]Case management [ ]Holistic Therapy      INTERVAL HPI/OVERNIGHT EVENTS:  In past 24 hours, received 0 prn doses of Oxy     SUBJECTIVE AND OBJECTIVE:  Patient declined German ; preferred for daughter at bedside to translate.   Patient with improvement of pain and dizziness.   Denies nausea/ dyspnea and constipation.  Patient and daughter deciding between home vs rehab at this time.     Allergies    No Known Allergies    Intolerances      MEDICATIONS  (STANDING):  acetaminophen     Tablet .. 975 milliGRAM(s) Oral every 8 hours  bisacodyl 5 milliGRAM(s) Oral at bedtime  chlorhexidine 2% Cloths 1 Application(s) Topical daily  cholecalciferol 400 Unit(s) Oral daily  dextrose 5%. 1000 milliLiter(s) (100 mL/Hr) IV Continuous <Continuous>  dextrose 5%. 1000 milliLiter(s) (50 mL/Hr) IV Continuous <Continuous>  dextrose 50% Injectable 25 Gram(s) IV Push once  dextrose 50% Injectable 12.5 Gram(s) IV Push once  dextrose 50% Injectable 25 Gram(s) IV Push once  enoxaparin Injectable 30 milliGRAM(s) SubCutaneous every 24 hours  gabapentin 300 milliGRAM(s) Oral three times a day  glucagon  Injectable 1 milliGRAM(s) IntraMuscular once  influenza   Vaccine 0.5 milliLiter(s) IntraMuscular once  insulin glargine Injectable (LANTUS) 4 Unit(s) SubCutaneous at bedtime  insulin lispro (ADMELOG) corrective regimen sliding scale   SubCutaneous three times a day before meals  insulin lispro (ADMELOG) corrective regimen sliding scale   SubCutaneous at bedtime  insulin lispro Injectable (ADMELOG) 1 Unit(s) SubCutaneous three times a day before meals  lidocaine   4% Patch 1 Patch Transdermal daily  methadone    Tablet 10 milliGRAM(s) Oral every 12 hours  montelukast 10 milliGRAM(s) Oral at bedtime  multivitamin 1 Tablet(s) Oral daily  ondansetron   Disintegrating Tablet 4 milliGRAM(s) Oral every 8 hours  pantoprazole    Tablet 40 milliGRAM(s) Oral before breakfast  polyethylene glycol 3350 17 Gram(s) Oral two times a day  povidone iodine 5% Nasal Swab 1 Application(s) Both Nostrils once  senna 2 Tablet(s) Oral at bedtime    MEDICATIONS  (PRN):  dextrose Oral Gel 15 Gram(s) Oral once PRN Blood Glucose LESS THAN 70 milliGRAM(s)/deciliter  meclizine 12.5 milliGRAM(s) Oral every 6 hours PRN Dizziness  naloxone 1 mG/mL Injection for Intranasal Use 0.4 milliGRAM(s) IntraNasal every 3 minutes PRN overdose  oxyCODONE    IR 5 milliGRAM(s) Oral every 4 hours PRN Moderate Pain (4 - 6)  oxyCODONE    IR 10 milliGRAM(s) Oral every 4 hours PRN Severe Pain (7 - 10)  prochlorperazine   Injectable 5 milliGRAM(s) IV Push every 8 hours PRN refractory nausea      ITEMS UNCHECKED ARE NOT PRESENT    PRESENT SYMPTOMS: [ ]Unable to self-report due to altered mental status- see [ ] CPOT [ ] PAINADS [ ] RDOS  Source if other than patient:  [ ]Family   [ ]Team     Pain: [ ]yes [ x]no .  QOL impact -  Location -                      Aggravating factors -  Quality -   Radiation -   Timing-   Severity (0-10 scale):   Minimal acceptable level / Pain Goal (0-10 scale):      Dyspnea:                           [ ]Mild [ ]Moderate [ ]Severe  Anxiety:                             [ ]Mild [ ]Moderate [ ]Severe  Agitation:                          [ ]Mild [ ]Moderate [ ]Severe  Fatigue:                             [ ]Mild [ ]Moderate [ ]Severe  Nausea:                             [ ]Mild [ ]Moderate [ ]Severe  Loss of appetite:              [ ]Mild [ ]Moderate [ ]Severe  Constipation:                   [ ]Mild [ ]Moderate [ ]Severe  Diarrhea:                          [ ]Mild [ ]Moderate [ ]Severe    CPOT:    https://www.[facility].org/getattachment/[email or web address]/Critical-Care-Pain-Observation-Tool-(CPOT)    PCSSQ[Palliative Care Spiritual Screening Question]   Severity (0-10):  Score of 4 or > indicate consideration of Chaplaincy referral.  Chaplaincy Referral: [ ] yes [ ] refused [ ] following [x ] deferred    Caregiver Washburn? : [ ] yes [ ] no [ ] Declined [x ] Deferred              Social work referral [ ] Patient & Family Centered Care Referral [ ]     Anticipatory Grief present?:  [x ] yes [ ] no  [ ] Deferred                  Social work referral [x ] Chaplaincy Referral[ ]    Other Symptoms:  [ x]All other review of systems negative     PHYSICAL EXAM:  Vital Signs Last 24 Hrs  T(C): 37.1 (31 Jan 2023 14:00), Max: 37.1 (30 Jan 2023 19:22)  T(F): 98.8 (31 Jan 2023 14:00), Max: 98.8 (30 Jan 2023 19:22)  HR: 84 (31 Jan 2023 14:00) (72 - 84)  BP: 93/55 (31 Jan 2023 14:00) (93/55 - 105/67)  BP(mean): --  RR: 17 (31 Jan 2023 14:00) (17 - 18)  SpO2: 100% (31 Jan 2023 14:00) (98% - 100%)    Parameters below as of 31 Jan 2023 14:00  Patient On (Oxygen Delivery Method): room air      GENERAL:  [x ]Alert  [x ]Oriented x 3  [ ]Lethargic  [ ]Cachexia  [ ]Unarousable  [x ]Verbal  [ ]Non-Verbal    Behavioral:   [ ] Anxiety  [ ] Delirium [ ] Agitation [ x] Calm  [ ] Other  HEENT:  [ x]Normal  [ ] Temporal Wasting  [ ]Dry mouth   [ ]ET Tube/Trach  [ ]Oral lesions  [ ] Mucositis  PULMONARY:   [ x]Clear [ ]Tachypnea  [ ]Audible excessive secretions   [ ]Rhonchi        [ ]Right [ ]Left [ ]Bilateral  [ ]Crackles        [ ]Right [ ]Left [ ]Bilateral  [ ]Wheezing     [ ]Right [ ]Left [ ]Bilateral  [ ]Diminished breath sounds [ ]right [ ]left [ ]bilateral  CARDIOVASCULAR:    [ x]Regular [ ]Irregular [ ]Tachy  [ ]Mditry [ ]Murmur [ ]Other  GASTROINTESTINAL:  [ x]Soft  [ ]Distended   [ ]+BS  [x ]Non tender [ ]Tender  [ ]PEG [ ]OGT/ NGT  Last BM: 1/30  GENITOURINARY:   [x ]Normal [ ] Incontinent   [ ]Oliguria/Anuria   [ ]Swanson  MUSCULOSKELETAL:   [ ]Normal   [x ]Weakness with limited left lower extremity movement s/p left hip surgery  [ ]Bed/Wheelchair bound [ ]Edema  [  ] amputation  [  ] contraction  NEUROLOGIC:   [ x]No focal deficits  [ ]Cognitive impairment  [ ]Dysphagia [ ]Dysarthria [ ]Paresis [ ]Other   SKIN: See Nursing Skin Assessment for further details  [x ]Normal    [ ]Rash  [ ]Pressure ulcer(s)       Present on admission [ ]y [ ]n   [  ]  Wound    [  ] hyperpigmentation      CRITICAL CARE:  [ ]Shock Present  [ ]Septic [ ]Cardiogenic [ ]Neurologic [ ]Hypovolemic  [ ]Vasopressors [ ]Inotropes  [ ]Respiratory failure present [ ]Mechanical Ventilation [ ]Non-invasive ventilatory support [ ]High-Flow   [ ]Acute  [ ]Chronic [ ]Hypoxic  [ ]Hypercarbic [ ]Other  [ ]Other organ failure     LABS:                          12.0   5.15  )-----------( 130      ( 31 Jan 2023 06:18 )             37.2       01-31    136  |  99  |  9   ----------------------------<  133<H>  4.3   |  28  |  0.50    Ca    9.3      31 Jan 2023 06:18  Phos  3.6     01-31  Mg     1.90     01-31          RADIOLOGY & ADDITIONAL STUDIES: reviewed     Protein Calorie Malnutrition Present: [ ]mild [ ]moderate [ ]severe [ ]underweight [ ]morbid obesity  https://www.andeal.org/vault/2440/web/files/ONC/Table_Clinical%20Characteristics%20to%20Document%20Malnutrition-White%20JV%20et%20al%202012.pdf    Height (cm): 149 (12-29-22 @ 22:41), 149 (12-27-22 @ 16:21), 147.3 (10-22-22 @ 08:57)  Weight (kg): 43.5 (12-27-22 @ 16:21), 47 (10-22-22 @ 08:57), 45.9 (07-20-22 @ 11:51)  BMI (kg/m2): 19.6 (12-29-22 @ 22:41), 19.6 (12-27-22 @ 16:21), 21.7 (10-22-22 @ 08:57)    [ ]PPSV2 < or = 30%  [ ]significant weight loss [ ]poor nutritional intake [ ]anasarca   [ ]Artificial Nutrition    REFERRALS:   [ ]Chaplaincy  [ ]Hospice  [ ]Child Life  [ ]Social Work  [ x]Case management [ ]Holistic Therapy

## 2023-01-31 NOTE — PROGRESS NOTE ADULT - PROBLEM SELECTOR PLAN 3
- s/p 5 fractions of RT - completed on 1/24  - PO Methadone 10mg BID (QTC on 1/23 - 416) (dose increased on 1/23)   - Discontinue PO Oxy IR 5mg q6 ATC as pain improving and dizziness improves with decreasing oxycodone doses   - PO Gabapentin 300mg TID  - Decrease to PO Oxy IR 5mg q4 PRN moderate pain (hold for hypotension, oversedation, respiratory depression)  - Decrease to PO Oxy IR 10mg q4 PRN severe pain (hold for hypotension, oversedation, respiratory depression)  - Bowel regimen while on opiates  - Narcan PRN - s/p 5 fractions of RT - completed on 1/24  - PO Methadone 10mg BID (QTC on 1/23 - 416) (dose increased on 1/23)   - PO Gabapentin 300mg TID  - PO Oxy IR 5mg q4 PRN moderate pain (hold for hypotension, oversedation, respiratory depression)  - PO Oxy IR 10mg q4 PRN severe pain (hold for hypotension, oversedation, respiratory depression)  - Bowel regimen while on opiates    Pt to follow up at [address], [place] [address]  as outpatient with Dr. Leyda Mayes/ Dr. Pack/ SHAILESH Ghotra  Please include their number [phone] in discharge paper work

## 2023-01-31 NOTE — PROGRESS NOTE ADULT - SUBJECTIVE AND OBJECTIVE BOX
INTERVAL HPI/OVERNIGHT EVENTS:  Patient seen at bedside.  Accompanied by her dtr Anwar  Who provided translation as per patient request  Patient with improved pain symptoms  Dispo planning ongoing       VITAL SIGNS:  T(F): 98.2 (01-31-23 @ 09:40)  HR: 76 (01-31-23 @ 09:40)  BP: 95/55 (01-31-23 @ 09:40)  RR: 17 (01-31-23 @ 09:40)  SpO2: 100% (01-31-23 @ 09:40)  Wt(kg): --    PHYSICAL EXAM:  GENERAL: NAD, well-developed, lying in bed;  HEAD:  Atraumatic, Normocephalic, MMM  CHEST/LUNG: No use of accessory muscles, CTAB, breathing non-labored  COR: RR, no mrcg  ABD: Soft, ND/NT, +BS  PSYCH: AAOx3  NEUROLOGY: CN II-XII grossly intact, moving all extremities  SKIN: No rashes or lesions  EXT: wwp, no cce, surgical site on left with dressing, c/d/i     MEDICATIONS  (STANDING):  acetaminophen     Tablet .. 975 milliGRAM(s) Oral every 8 hours  bisacodyl 5 milliGRAM(s) Oral at bedtime  chlorhexidine 2% Cloths 1 Application(s) Topical daily  cholecalciferol 400 Unit(s) Oral daily  dextrose 5%. 1000 milliLiter(s) (100 mL/Hr) IV Continuous <Continuous>  dextrose 5%. 1000 milliLiter(s) (50 mL/Hr) IV Continuous <Continuous>  dextrose 50% Injectable 25 Gram(s) IV Push once  dextrose 50% Injectable 25 Gram(s) IV Push once  dextrose 50% Injectable 12.5 Gram(s) IV Push once  enoxaparin Injectable 30 milliGRAM(s) SubCutaneous every 24 hours  gabapentin 300 milliGRAM(s) Oral three times a day  glucagon  Injectable 1 milliGRAM(s) IntraMuscular once  influenza   Vaccine 0.5 milliLiter(s) IntraMuscular once  insulin glargine Injectable (LANTUS) 4 Unit(s) SubCutaneous at bedtime  insulin lispro (ADMELOG) corrective regimen sliding scale   SubCutaneous at bedtime  insulin lispro (ADMELOG) corrective regimen sliding scale   SubCutaneous three times a day before meals  insulin lispro Injectable (ADMELOG) 1 Unit(s) SubCutaneous three times a day before meals  lidocaine   4% Patch 1 Patch Transdermal daily  methadone    Tablet 10 milliGRAM(s) Oral every 12 hours  montelukast 10 milliGRAM(s) Oral at bedtime  multivitamin 1 Tablet(s) Oral daily  ondansetron   Disintegrating Tablet 4 milliGRAM(s) Oral every 8 hours  pantoprazole    Tablet 40 milliGRAM(s) Oral before breakfast  polyethylene glycol 3350 17 Gram(s) Oral two times a day  povidone iodine 5% Nasal Swab 1 Application(s) Both Nostrils once  senna 2 Tablet(s) Oral at bedtime    MEDICATIONS  (PRN):  dextrose Oral Gel 15 Gram(s) Oral once PRN Blood Glucose LESS THAN 70 milliGRAM(s)/deciliter  meclizine 12.5 milliGRAM(s) Oral every 6 hours PRN Dizziness  naloxone 1 mG/mL Injection for Intranasal Use 0.4 milliGRAM(s) IntraNasal every 3 minutes PRN overdose  oxyCODONE    IR 5 milliGRAM(s) Oral every 4 hours PRN Moderate Pain (4 - 6)  oxyCODONE    IR 10 milliGRAM(s) Oral every 4 hours PRN Severe Pain (7 - 10)  prochlorperazine   Injectable 5 milliGRAM(s) IV Push every 8 hours PRN refractory nausea      Allergies    No Known Allergies    Intolerances        LABS:                        12.0   5.15  )-----------( 130      ( 31 Jan 2023 06:18 )             37.2     01-31    136  |  99  |  9   ----------------------------<  133<H>  4.3   |  28  |  0.50    Ca    9.3      31 Jan 2023 06:18  Phos  3.6     01-31  Mg     1.90     01-31            RADIOLOGY & ADDITIONAL TESTS:  Studies reviewed.

## 2023-01-31 NOTE — PROGRESS NOTE ADULT - NS ATTEND AMEND GEN_ALL_CORE FT
Patient seen at bedside. Case discussed with TIN Bernstein. Plan as above.
Patient seen at bedside. Case discussed with TIN Bernstein. Plan as above.   Pain has improved  Pt unable to perform ADLs, will need rehab  No chemotherapy while in rehab  Follow with med onc outpt
Patient seen at bedside. Case discussed with TIN Bernstein. Plan as above.   Will need to reclarify plans with ortho and rad onc   Pain well controlled at rest  Will follow
56 yo F with metastatic pancreatic CA (mets to L spine, L femur). She was first diagnosed with pancreatic cancer in 2018 and then in 1/2019 underwent a Whipple for Stage III pancreatic cancer, T3N1 with 1/24 LN positive. She did well until 7/2022 when she was found to have recurrent disease and was started on chemo with Abraxane and Gemzar. She tolerated chemotherapy well overall. She is now admitted with LLE pain x 1mo with difficulty ambulating x1wk. CT shows no evidence of metastatic disease at this time. Bone scan and MRI show evidence of progression of cancer with metastatic lesions within the left femoral neck and superior articular facet of L5. The lesion in the left femoral neck likely places the patient at increased risk for pathologic fracture. However, as per Surgery, there is no plan for surgery at this time as it is thought that her pain will be relieved with radiation. If pain does not resolve or there is a fracture in the future then she can have surgery at a later date. Patient should have palliative radiation and then follow up with Dr. Navarro at [facility] to discuss further chemotherapy options.

## 2023-02-01 LAB
ANION GAP SERPL CALC-SCNC: 8 MMOL/L — SIGNIFICANT CHANGE UP (ref 7–14)
BUN SERPL-MCNC: 9 MG/DL — SIGNIFICANT CHANGE UP (ref 7–23)
CALCIUM SERPL-MCNC: 9.4 MG/DL — SIGNIFICANT CHANGE UP (ref 8.4–10.5)
CHLORIDE SERPL-SCNC: 101 MMOL/L — SIGNIFICANT CHANGE UP (ref 98–107)
CO2 SERPL-SCNC: 26 MMOL/L — SIGNIFICANT CHANGE UP (ref 22–31)
CREAT SERPL-MCNC: 0.48 MG/DL — LOW (ref 0.5–1.3)
EGFR: 110 ML/MIN/1.73M2 — SIGNIFICANT CHANGE UP
GLUCOSE SERPL-MCNC: 95 MG/DL — SIGNIFICANT CHANGE UP (ref 70–99)
HCT VFR BLD CALC: 36.1 % — SIGNIFICANT CHANGE UP (ref 34.5–45)
HGB BLD-MCNC: 11.6 G/DL — SIGNIFICANT CHANGE UP (ref 11.5–15.5)
MAGNESIUM SERPL-MCNC: 1.9 MG/DL — SIGNIFICANT CHANGE UP (ref 1.6–2.6)
MCHC RBC-ENTMCNC: 25.9 PG — LOW (ref 27–34)
MCHC RBC-ENTMCNC: 32.1 GM/DL — SIGNIFICANT CHANGE UP (ref 32–36)
MCV RBC AUTO: 80.6 FL — SIGNIFICANT CHANGE UP (ref 80–100)
NRBC # BLD: 0 /100 WBCS — SIGNIFICANT CHANGE UP (ref 0–0)
NRBC # FLD: 0 K/UL — SIGNIFICANT CHANGE UP (ref 0–0)
PHOSPHATE SERPL-MCNC: 3.7 MG/DL — SIGNIFICANT CHANGE UP (ref 2.5–4.5)
PLATELET # BLD AUTO: 120 K/UL — LOW (ref 150–400)
POTASSIUM SERPL-MCNC: 4.8 MMOL/L — SIGNIFICANT CHANGE UP (ref 3.5–5.3)
POTASSIUM SERPL-SCNC: 4.8 MMOL/L — SIGNIFICANT CHANGE UP (ref 3.5–5.3)
RBC # BLD: 4.48 M/UL — SIGNIFICANT CHANGE UP (ref 3.8–5.2)
RBC # FLD: 15.9 % — HIGH (ref 10.3–14.5)
SODIUM SERPL-SCNC: 135 MMOL/L — SIGNIFICANT CHANGE UP (ref 135–145)
WBC # BLD: 5.16 K/UL — SIGNIFICANT CHANGE UP (ref 3.8–10.5)
WBC # FLD AUTO: 5.16 K/UL — SIGNIFICANT CHANGE UP (ref 3.8–10.5)

## 2023-02-01 PROCEDURE — 99232 SBSQ HOSP IP/OBS MODERATE 35: CPT

## 2023-02-01 RX ADMIN — Medication 975 MILLIGRAM(S): at 06:39

## 2023-02-01 RX ADMIN — Medication 1 TABLET(S): at 14:09

## 2023-02-01 RX ADMIN — Medication 975 MILLIGRAM(S): at 14:09

## 2023-02-01 RX ADMIN — Medication 5 MILLIGRAM(S): at 22:14

## 2023-02-01 RX ADMIN — GABAPENTIN 300 MILLIGRAM(S): 400 CAPSULE ORAL at 06:39

## 2023-02-01 RX ADMIN — PANTOPRAZOLE SODIUM 40 MILLIGRAM(S): 20 TABLET, DELAYED RELEASE ORAL at 06:39

## 2023-02-01 RX ADMIN — ONDANSETRON 4 MILLIGRAM(S): 8 TABLET, FILM COATED ORAL at 22:14

## 2023-02-01 RX ADMIN — ONDANSETRON 4 MILLIGRAM(S): 8 TABLET, FILM COATED ORAL at 14:10

## 2023-02-01 RX ADMIN — INSULIN GLARGINE 4 UNIT(S): 100 INJECTION, SOLUTION SUBCUTANEOUS at 23:57

## 2023-02-01 RX ADMIN — GABAPENTIN 300 MILLIGRAM(S): 400 CAPSULE ORAL at 14:09

## 2023-02-01 RX ADMIN — Medication 1 UNIT(S): at 09:06

## 2023-02-01 RX ADMIN — Medication 1 UNIT(S): at 18:23

## 2023-02-01 RX ADMIN — SENNA PLUS 2 TABLET(S): 8.6 TABLET ORAL at 22:13

## 2023-02-01 RX ADMIN — POLYETHYLENE GLYCOL 3350 17 GRAM(S): 17 POWDER, FOR SOLUTION ORAL at 06:40

## 2023-02-01 RX ADMIN — Medication 975 MILLIGRAM(S): at 15:09

## 2023-02-01 RX ADMIN — ENOXAPARIN SODIUM 30 MILLIGRAM(S): 100 INJECTION SUBCUTANEOUS at 18:24

## 2023-02-01 RX ADMIN — Medication 975 MILLIGRAM(S): at 22:13

## 2023-02-01 RX ADMIN — METHADONE HYDROCHLORIDE 10 MILLIGRAM(S): 40 TABLET ORAL at 06:40

## 2023-02-01 RX ADMIN — ONDANSETRON 4 MILLIGRAM(S): 8 TABLET, FILM COATED ORAL at 06:40

## 2023-02-01 RX ADMIN — Medication 400 UNIT(S): at 14:10

## 2023-02-01 RX ADMIN — GABAPENTIN 300 MILLIGRAM(S): 400 CAPSULE ORAL at 22:14

## 2023-02-01 RX ADMIN — POLYETHYLENE GLYCOL 3350 17 GRAM(S): 17 POWDER, FOR SOLUTION ORAL at 18:24

## 2023-02-01 RX ADMIN — Medication 2: at 09:06

## 2023-02-01 RX ADMIN — CHLORHEXIDINE GLUCONATE 1 APPLICATION(S): 213 SOLUTION TOPICAL at 14:10

## 2023-02-01 RX ADMIN — MONTELUKAST 10 MILLIGRAM(S): 4 TABLET, CHEWABLE ORAL at 22:14

## 2023-02-01 RX ADMIN — METHADONE HYDROCHLORIDE 10 MILLIGRAM(S): 40 TABLET ORAL at 18:23

## 2023-02-01 NOTE — PROGRESS NOTE ADULT - SUBJECTIVE AND OBJECTIVE BOX
Patient is a 58y old  Female who presents with a chief complaint of Difficulty ambulating (01 Feb 2023 06:38)      SUBJECTIVE / OVERNIGHT EVENTS: No acute events overnight.  at bedside and pt's daughter on the phone. Discussed plan for dispo to rehab, auth pending. Pt complaining of cramping in LLE but does not want more medications. Offered warm compress.  ADDITIONAL REVIEW OF SYSTEMS:    MEDICATIONS  (STANDING):  acetaminophen     Tablet .. 975 milliGRAM(s) Oral every 8 hours  bisacodyl 5 milliGRAM(s) Oral at bedtime  chlorhexidine 2% Cloths 1 Application(s) Topical daily  cholecalciferol 400 Unit(s) Oral daily  dextrose 5%. 1000 milliLiter(s) (100 mL/Hr) IV Continuous <Continuous>  dextrose 5%. 1000 milliLiter(s) (50 mL/Hr) IV Continuous <Continuous>  dextrose 50% Injectable 25 Gram(s) IV Push once  dextrose 50% Injectable 12.5 Gram(s) IV Push once  dextrose 50% Injectable 25 Gram(s) IV Push once  enoxaparin Injectable 30 milliGRAM(s) SubCutaneous every 24 hours  gabapentin 300 milliGRAM(s) Oral three times a day  glucagon  Injectable 1 milliGRAM(s) IntraMuscular once  influenza   Vaccine 0.5 milliLiter(s) IntraMuscular once  insulin glargine Injectable (LANTUS) 4 Unit(s) SubCutaneous at bedtime  insulin lispro (ADMELOG) corrective regimen sliding scale   SubCutaneous three times a day before meals  insulin lispro (ADMELOG) corrective regimen sliding scale   SubCutaneous at bedtime  insulin lispro Injectable (ADMELOG) 1 Unit(s) SubCutaneous three times a day before meals  lidocaine   4% Patch 1 Patch Transdermal daily  methadone    Tablet 10 milliGRAM(s) Oral every 12 hours  montelukast 10 milliGRAM(s) Oral at bedtime  multivitamin 1 Tablet(s) Oral daily  ondansetron   Disintegrating Tablet 4 milliGRAM(s) Oral every 8 hours  pantoprazole    Tablet 40 milliGRAM(s) Oral before breakfast  polyethylene glycol 3350 17 Gram(s) Oral two times a day  povidone iodine 5% Nasal Swab 1 Application(s) Both Nostrils once  senna 2 Tablet(s) Oral at bedtime    MEDICATIONS  (PRN):  dextrose Oral Gel 15 Gram(s) Oral once PRN Blood Glucose LESS THAN 70 milliGRAM(s)/deciliter  meclizine 12.5 milliGRAM(s) Oral every 6 hours PRN Dizziness  naloxone 1 mG/mL Injection for Intranasal Use 0.4 milliGRAM(s) IntraNasal every 3 minutes PRN overdose  oxyCODONE    IR 5 milliGRAM(s) Oral every 4 hours PRN Moderate Pain (4 - 6)  oxyCODONE    IR 10 milliGRAM(s) Oral every 4 hours PRN Severe Pain (7 - 10)  prochlorperazine   Injectable 5 milliGRAM(s) IV Push every 8 hours PRN refractory nausea      CAPILLARY BLOOD GLUCOSE      POCT Blood Glucose.: 84 mg/dL (01 Feb 2023 12:24)  POCT Blood Glucose.: 177 mg/dL (01 Feb 2023 09:02)  POCT Blood Glucose.: 151 mg/dL (31 Jan 2023 22:34)  POCT Blood Glucose.: 141 mg/dL (31 Jan 2023 18:00)    I&O's Summary      PHYSICAL EXAM:  Vital Signs Last 24 Hrs  T(C): 36.4 (01 Feb 2023 14:00), Max: 36.9 (31 Jan 2023 18:15)  T(F): 97.5 (01 Feb 2023 14:00), Max: 98.5 (31 Jan 2023 18:15)  HR: 87 (01 Feb 2023 14:00) (68 - 87)  BP: 94/56 (01 Feb 2023 14:00) (94/56 - 119/54)  BP(mean): --  RR: 18 (01 Feb 2023 14:00) (17 - 18)  SpO2: 100% (01 Feb 2023 14:00) (99% - 100%)    Parameters below as of 01 Feb 2023 14:00  Patient On (Oxygen Delivery Method): room air      GENERAL: NAD, well-developed, lying in bed;  HEAD:  Atraumatic, Normocephalic, MMM  CHEST/LUNG: No use of accessory muscles, CTAB, breathing non-labored  COR: RR, no mrcg  ABD: Soft, ND/NT, +BS  PSYCH: AAOx3  NEUROLOGY: CN II-XII grossly intact, moving all extremities  SKIN: No rashes or lesions  EXT: wwp, no cce, surgical site on left with dressing, c/d/i       LABS:                        11.6   5.16  )-----------( 120      ( 01 Feb 2023 05:40 )             36.1     02-01    135  |  101  |  9   ----------------------------<  95  4.8   |  26  |  0.48<L>    Ca    9.4      01 Feb 2023 05:40  Phos  3.7     02-01  Mg     1.90     02-01                  RADIOLOGY & ADDITIONAL TESTS:  Results Reviewed:   Imaging Personally Reviewed:  Electrocardiogram Personally Reviewed:    COORDINATION OF CARE:  Care Discussed with Consultants/Other Providers [Y/N]:  Prior or Outpatient Records Reviewed [Y/N]:

## 2023-02-01 NOTE — PROGRESS NOTE ADULT - SUBJECTIVE AND OBJECTIVE BOX
ORTHO PROGRESS NOTE     Pt seen and examined at bedside. Daughter at bedside. No significant overnight events. Pain well controlled.   Describe a tingling/cramping sensation from right above the knee to foot worse when in bed. Discussed with patient that abduction pillow need not be tight around the legs and encourage movement foot/leg while in bed  Making progress with PT.     Vital Signs Last 24 Hrs  Vital Signs Last 24 Hrs  T(C): 36.4 (01 Feb 2023 06:41), Max: 37.1 (31 Jan 2023 14:00)  T(F): 97.5 (01 Feb 2023 06:41), Max: 98.8 (31 Jan 2023 14:00)  HR: 78 (01 Feb 2023 06:41) (68 - 84)  BP: 100/55 (01 Feb 2023 06:41) (93/55 - 110/55)  BP(mean): --  RR: 17 (01 Feb 2023 06:41) (17 - 18)  SpO2: 99% (01 Feb 2023 06:41) (99% - 100%)    Parameters below as of 01 Feb 2023 06:41  Patient On (Oxygen Delivery Method): room air        Gen: NAD, alert and oriented  Resp: Unlabored breathing  LLE: Dressing c/d/i       SILT DP/SP/ Denies/Saph/tib       5/5 EHL 5/5 FHL 5/5 TA 5/5 Gastroc        DP+,        soft compartments       no palpable  hematoma          A/P  Pt is a 58y Female s/p L hip florina on 1/26. Patient stable     - Continue Pain control/ Analgesia  - DVT ppx  -PT/OT   -WBAT  - Dispo: ok to discharge to [facility].  - Follow up with Dr. Villa in 2 weeks.  -Rest of care per primary team    Orthopaedic Surgery  [facility] [id number]  [facility]        [id number]  [facility]  [id number]/ [phone]

## 2023-02-02 PROCEDURE — 99233 SBSQ HOSP IP/OBS HIGH 50: CPT

## 2023-02-02 PROCEDURE — 99232 SBSQ HOSP IP/OBS MODERATE 35: CPT

## 2023-02-02 RX ORDER — SENNA PLUS 8.6 MG/1
2 TABLET ORAL
Qty: 60 | Refills: 0
Start: 2023-02-02 | End: 2023-03-03

## 2023-02-02 RX ORDER — OXYCODONE HYDROCHLORIDE 5 MG/1
1 TABLET ORAL
Qty: 180 | Refills: 0
Start: 2023-02-02 | End: 2023-03-03

## 2023-02-02 RX ORDER — METHADONE HYDROCHLORIDE 40 MG/1
10 TABLET ORAL EVERY 12 HOURS
Refills: 0 | Status: DISCONTINUED | OUTPATIENT
Start: 2023-02-02 | End: 2023-02-03

## 2023-02-02 RX ORDER — SENNOSIDES/DOCUSATE SODIUM 8.6MG-50MG
3 TABLET ORAL
Qty: 0 | Refills: 0 | DISCHARGE

## 2023-02-02 RX ORDER — LIDOCAINE 4 G/100G
1 CREAM TOPICAL
Qty: 30 | Refills: 0
Start: 2023-02-02 | End: 2023-03-03

## 2023-02-02 RX ORDER — METHADONE HYDROCHLORIDE 40 MG/1
1 TABLET ORAL
Qty: 30 | Refills: 0
Start: 2023-02-02 | End: 2023-03-03

## 2023-02-02 RX ORDER — ATORVASTATIN CALCIUM 80 MG/1
1 TABLET, FILM COATED ORAL
Qty: 0 | Refills: 0 | DISCHARGE

## 2023-02-02 RX ORDER — POLYETHYLENE GLYCOL 3350 17 G/17G
17 POWDER, FOR SOLUTION ORAL
Qty: 510 | Refills: 0
Start: 2023-02-02 | End: 2023-03-03

## 2023-02-02 RX ORDER — POLYETHYLENE GLYCOL 3350 17 G/17G
17 POWDER, FOR SOLUTION ORAL
Qty: 0 | Refills: 0 | DISCHARGE

## 2023-02-02 RX ORDER — OXYCODONE HYDROCHLORIDE 5 MG/1
5 TABLET ORAL EVERY 4 HOURS
Refills: 0 | Status: DISCONTINUED | OUTPATIENT
Start: 2023-02-02 | End: 2023-02-03

## 2023-02-02 RX ORDER — OXYCODONE HYDROCHLORIDE 5 MG/1
1 TABLET ORAL
Qty: 30 | Refills: 0
Start: 2023-02-02 | End: 2023-03-03

## 2023-02-02 RX ORDER — OXYCODONE HYDROCHLORIDE 5 MG/1
10 TABLET ORAL EVERY 4 HOURS
Refills: 0 | Status: DISCONTINUED | OUTPATIENT
Start: 2023-02-02 | End: 2023-02-03

## 2023-02-02 RX ORDER — METHADONE HYDROCHLORIDE 40 MG/1
1 TABLET ORAL
Qty: 60 | Refills: 0
Start: 2023-02-02 | End: 2023-03-03

## 2023-02-02 RX ADMIN — OXYCODONE HYDROCHLORIDE 5 MILLIGRAM(S): 5 TABLET ORAL at 22:47

## 2023-02-02 RX ADMIN — MONTELUKAST 10 MILLIGRAM(S): 4 TABLET, CHEWABLE ORAL at 21:28

## 2023-02-02 RX ADMIN — ONDANSETRON 4 MILLIGRAM(S): 8 TABLET, FILM COATED ORAL at 09:12

## 2023-02-02 RX ADMIN — METHADONE HYDROCHLORIDE 10 MILLIGRAM(S): 40 TABLET ORAL at 17:34

## 2023-02-02 RX ADMIN — Medication 975 MILLIGRAM(S): at 06:17

## 2023-02-02 RX ADMIN — Medication 5 MILLIGRAM(S): at 21:29

## 2023-02-02 RX ADMIN — OXYCODONE HYDROCHLORIDE 5 MILLIGRAM(S): 5 TABLET ORAL at 01:00

## 2023-02-02 RX ADMIN — GABAPENTIN 300 MILLIGRAM(S): 400 CAPSULE ORAL at 13:36

## 2023-02-02 RX ADMIN — Medication 975 MILLIGRAM(S): at 21:47

## 2023-02-02 RX ADMIN — POLYETHYLENE GLYCOL 3350 17 GRAM(S): 17 POWDER, FOR SOLUTION ORAL at 06:13

## 2023-02-02 RX ADMIN — PANTOPRAZOLE SODIUM 40 MILLIGRAM(S): 20 TABLET, DELAYED RELEASE ORAL at 06:13

## 2023-02-02 RX ADMIN — Medication 975 MILLIGRAM(S): at 14:23

## 2023-02-02 RX ADMIN — OXYCODONE HYDROCHLORIDE 5 MILLIGRAM(S): 5 TABLET ORAL at 21:47

## 2023-02-02 RX ADMIN — CHLORHEXIDINE GLUCONATE 1 APPLICATION(S): 213 SOLUTION TOPICAL at 11:58

## 2023-02-02 RX ADMIN — Medication 400 UNIT(S): at 11:49

## 2023-02-02 RX ADMIN — Medication 2: at 18:02

## 2023-02-02 RX ADMIN — GABAPENTIN 300 MILLIGRAM(S): 400 CAPSULE ORAL at 21:28

## 2023-02-02 RX ADMIN — Medication 1 UNIT(S): at 18:02

## 2023-02-02 RX ADMIN — SENNA PLUS 2 TABLET(S): 8.6 TABLET ORAL at 21:29

## 2023-02-02 RX ADMIN — Medication 975 MILLIGRAM(S): at 22:47

## 2023-02-02 RX ADMIN — Medication 975 MILLIGRAM(S): at 06:13

## 2023-02-02 RX ADMIN — Medication 1 TABLET(S): at 11:49

## 2023-02-02 RX ADMIN — Medication 975 MILLIGRAM(S): at 13:38

## 2023-02-02 RX ADMIN — GABAPENTIN 300 MILLIGRAM(S): 400 CAPSULE ORAL at 06:13

## 2023-02-02 RX ADMIN — Medication 1 UNIT(S): at 13:34

## 2023-02-02 RX ADMIN — METHADONE HYDROCHLORIDE 10 MILLIGRAM(S): 40 TABLET ORAL at 06:13

## 2023-02-02 RX ADMIN — OXYCODONE HYDROCHLORIDE 5 MILLIGRAM(S): 5 TABLET ORAL at 00:02

## 2023-02-02 RX ADMIN — Medication 1 UNIT(S): at 09:11

## 2023-02-02 RX ADMIN — INSULIN GLARGINE 4 UNIT(S): 100 INJECTION, SOLUTION SUBCUTANEOUS at 22:21

## 2023-02-02 RX ADMIN — ENOXAPARIN SODIUM 30 MILLIGRAM(S): 100 INJECTION SUBCUTANEOUS at 17:34

## 2023-02-02 RX ADMIN — ONDANSETRON 4 MILLIGRAM(S): 8 TABLET, FILM COATED ORAL at 21:27

## 2023-02-02 RX ADMIN — ONDANSETRON 4 MILLIGRAM(S): 8 TABLET, FILM COATED ORAL at 13:36

## 2023-02-02 NOTE — PROGRESS NOTE ADULT - SUBJECTIVE AND OBJECTIVE BOX
Chief Complaint: s/p hip surgery     INTERVAL HPI/OVERNIGHT EVENTS: No significant pain. Was able to get up today with assistance. Eating a little. Daughter at bedside. Pending transfer to [facility].     MEDICATIONS  (STANDING):  acetaminophen     Tablet .. 975 milliGRAM(s) Oral every 8 hours  bisacodyl 5 milliGRAM(s) Oral at bedtime  chlorhexidine 2% Cloths 1 Application(s) Topical daily  cholecalciferol 400 Unit(s) Oral daily  dextrose 5%. 1000 milliLiter(s) (50 mL/Hr) IV Continuous <Continuous>  dextrose 5%. 1000 milliLiter(s) (100 mL/Hr) IV Continuous <Continuous>  dextrose 50% Injectable 25 Gram(s) IV Push once  dextrose 50% Injectable 12.5 Gram(s) IV Push once  dextrose 50% Injectable 25 Gram(s) IV Push once  enoxaparin Injectable 30 milliGRAM(s) SubCutaneous every 24 hours  gabapentin 300 milliGRAM(s) Oral three times a day  glucagon  Injectable 1 milliGRAM(s) IntraMuscular once  influenza   Vaccine 0.5 milliLiter(s) IntraMuscular once  insulin glargine Injectable (LANTUS) 4 Unit(s) SubCutaneous at bedtime  insulin lispro (ADMELOG) corrective regimen sliding scale   SubCutaneous three times a day before meals  insulin lispro (ADMELOG) corrective regimen sliding scale   SubCutaneous at bedtime  insulin lispro Injectable (ADMELOG) 1 Unit(s) SubCutaneous three times a day before meals  lidocaine   4% Patch 1 Patch Transdermal daily  methadone    Tablet 10 milliGRAM(s) Oral every 12 hours  montelukast 10 milliGRAM(s) Oral at bedtime  multivitamin 1 Tablet(s) Oral daily  ondansetron   Disintegrating Tablet 4 milliGRAM(s) Oral every 8 hours  pantoprazole    Tablet 40 milliGRAM(s) Oral before breakfast  polyethylene glycol 3350 17 Gram(s) Oral two times a day  povidone iodine 5% Nasal Swab 1 Application(s) Both Nostrils once  senna 2 Tablet(s) Oral at bedtime    MEDICATIONS  (PRN):  dextrose Oral Gel 15 Gram(s) Oral once PRN Blood Glucose LESS THAN 70 milliGRAM(s)/deciliter  meclizine 12.5 milliGRAM(s) Oral every 6 hours PRN Dizziness  naloxone 1 mG/mL Injection for Intranasal Use 0.4 milliGRAM(s) IntraNasal every 3 minutes PRN overdose  oxyCODONE    IR 10 milliGRAM(s) Oral every 4 hours PRN Severe Pain (7 - 10)  oxyCODONE    IR 5 milliGRAM(s) Oral every 4 hours PRN Moderate Pain (4 - 6)  prochlorperazine   Injectable 5 milliGRAM(s) IV Push every 8 hours PRN refractory nausea      Allergies    No Known Allergies    Intolerances        ROS: as above     Vital Signs Last 24 Hrs  T(C): 36.6 (03 Feb 2023 05:11), Max: 36.9 (02 Feb 2023 18:07)  T(F): 97.9 (03 Feb 2023 05:11), Max: 98.5 (02 Feb 2023 18:07)  HR: 89 (03 Feb 2023 05:11) (74 - 89)  BP: 108/70 (03 Feb 2023 05:11) (90/58 - 108/70)  BP(mean): --  RR: 18 (03 Feb 2023 05:11) (16 - 18)  SpO2: 100% (03 Feb 2023 05:11) (98% - 100%)    Parameters below as of 03 Feb 2023 05:11  Patient On (Oxygen Delivery Method): room air        Physical Exam:   AAO x 3, NAD   RRR S1S2  CTA b/l   soft NTNDBS+  no c/c/e    LABS:

## 2023-02-02 NOTE — PROGRESS NOTE ADULT - SUBJECTIVE AND OBJECTIVE BOX
Patient is a 58y old  Female who presents with a chief complaint of Difficulty ambulating (02 Feb 2023 05:32)      SUBJECTIVE / OVERNIGHT EVENTS: No acute events overnight. Pt has no new complaints     ADDITIONAL REVIEW OF SYSTEMS:    MEDICATIONS  (STANDING):  acetaminophen     Tablet .. 975 milliGRAM(s) Oral every 8 hours  bisacodyl 5 milliGRAM(s) Oral at bedtime  chlorhexidine 2% Cloths 1 Application(s) Topical daily  cholecalciferol 400 Unit(s) Oral daily  dextrose 5%. 1000 milliLiter(s) (50 mL/Hr) IV Continuous <Continuous>  dextrose 5%. 1000 milliLiter(s) (100 mL/Hr) IV Continuous <Continuous>  dextrose 50% Injectable 25 Gram(s) IV Push once  dextrose 50% Injectable 12.5 Gram(s) IV Push once  dextrose 50% Injectable 25 Gram(s) IV Push once  enoxaparin Injectable 30 milliGRAM(s) SubCutaneous every 24 hours  gabapentin 300 milliGRAM(s) Oral three times a day  glucagon  Injectable 1 milliGRAM(s) IntraMuscular once  influenza   Vaccine 0.5 milliLiter(s) IntraMuscular once  insulin glargine Injectable (LANTUS) 4 Unit(s) SubCutaneous at bedtime  insulin lispro (ADMELOG) corrective regimen sliding scale   SubCutaneous three times a day before meals  insulin lispro (ADMELOG) corrective regimen sliding scale   SubCutaneous at bedtime  insulin lispro Injectable (ADMELOG) 1 Unit(s) SubCutaneous three times a day before meals  lidocaine   4% Patch 1 Patch Transdermal daily  methadone    Tablet 10 milliGRAM(s) Oral every 12 hours  montelukast 10 milliGRAM(s) Oral at bedtime  multivitamin 1 Tablet(s) Oral daily  ondansetron   Disintegrating Tablet 4 milliGRAM(s) Oral every 8 hours  pantoprazole    Tablet 40 milliGRAM(s) Oral before breakfast  polyethylene glycol 3350 17 Gram(s) Oral two times a day  povidone iodine 5% Nasal Swab 1 Application(s) Both Nostrils once  senna 2 Tablet(s) Oral at bedtime    MEDICATIONS  (PRN):  dextrose Oral Gel 15 Gram(s) Oral once PRN Blood Glucose LESS THAN 70 milliGRAM(s)/deciliter  meclizine 12.5 milliGRAM(s) Oral every 6 hours PRN Dizziness  naloxone 1 mG/mL Injection for Intranasal Use 0.4 milliGRAM(s) IntraNasal every 3 minutes PRN overdose  oxyCODONE    IR 10 milliGRAM(s) Oral every 4 hours PRN Severe Pain (7 - 10)  oxyCODONE    IR 5 milliGRAM(s) Oral every 4 hours PRN Moderate Pain (4 - 6)  prochlorperazine   Injectable 5 milliGRAM(s) IV Push every 8 hours PRN refractory nausea      CAPILLARY BLOOD GLUCOSE      POCT Blood Glucose.: 102 mg/dL (02 Feb 2023 13:32)  POCT Blood Glucose.: 197 mg/dL (02 Feb 2023 12:16)  POCT Blood Glucose.: 109 mg/dL (02 Feb 2023 08:47)  POCT Blood Glucose.: 166 mg/dL (01 Feb 2023 23:54)  POCT Blood Glucose.: 94 mg/dL (01 Feb 2023 22:41)  POCT Blood Glucose.: 103 mg/dL (01 Feb 2023 18:01)    I&O's Summary    01 Feb 2023 07:01  -  02 Feb 2023 07:00  --------------------------------------------------------  IN: 400 mL / OUT: 480 mL / NET: -80 mL        PHYSICAL EXAM:  Vital Signs Last 24 Hrs  T(C): 36.6 (02 Feb 2023 14:26), Max: 37.1 (01 Feb 2023 20:24)  T(F): 97.9 (02 Feb 2023 14:26), Max: 98.8 (01 Feb 2023 20:24)  HR: 78 (02 Feb 2023 14:26) (72 - 87)  BP: 90/58 (02 Feb 2023 14:26) (83/49 - 106/61)  BP(mean): --  RR: 18 (02 Feb 2023 14:26) (16 - 18)  SpO2: 99% (02 Feb 2023 14:26) (96% - 100%)    Parameters below as of 02 Feb 2023 14:26  Patient On (Oxygen Delivery Method): room air      GENERAL: NAD, well-developed, lying in bed;  HEAD:  Atraumatic, Normocephalic, MMM  CHEST/LUNG: No use of accessory muscles, CTAB, breathing non-labored  COR: RR, no mrcg  ABD: Soft, ND/NT, +BS  PSYCH: AAOx3  NEUROLOGY: CN II-XII grossly intact, moving all extremities  SKIN: No rashes or lesions  EXT: wwp, no cce, surgical site on left with dressing, c/d/i     LABS:                        11.6   5.16  )-----------( 120      ( 01 Feb 2023 05:40 )             36.1     02-01    135  |  101  |  9   ----------------------------<  95  4.8   |  26  |  0.48<L>    Ca    9.4      01 Feb 2023 05:40  Phos  3.7     02-01  Mg     1.90     02-01                  RADIOLOGY & ADDITIONAL TESTS:  Results Reviewed:   Imaging Personally Reviewed:  Electrocardiogram Personally Reviewed:    COORDINATION OF CARE:  Care Discussed with Consultants/Other Providers [Y/N]:  Prior or Outpatient Records Reviewed [Y/N]:

## 2023-02-02 NOTE — PROGRESS NOTE ADULT - ASSESSMENT
56 yo F with metastatic pancreatic CA (mets to L spine, L femur). She was first diagnosed with pancreatic cancer in 2018 and then in 1/2019 underwent a Whipple for Stage III pancreatic cancer, T3N1 with 1/24 LN positive. She did well until 7/2022 when she was found to have recurrent disease and was started on chemo with Abraxane and Gemzar. She tolerated chemotherapy well overall. She is now admitted with LLE pain x 1mo with difficulty ambulating x1wk. CT shows no evidence of metastatic disease at this time. Bone scan and MRI show evidence of progression of cancer with metastatic lesions within the left femoral neck and superior articular facet of L5. The lesion in the left femoral neck likely places the patient at increased risk for pathologic fracture.    Patient s/p  5 fractions of RT to the left femur however with increased left pain  1/25- XR hip with L pathological femoral neck fracture; orthopedics re consulted  1/26 s/p L hemiarthoplasty  1/27 pt with hypotension stat CT left  hip revealing Status post recent left hip arthroplasty with postsurgical changes   including left adductor, vastus lateralis and gluteus nura muscle   hematomas with small locules of gas.  To followup Ortho for further recommendations  -GOC: They are interested in continuing treatment and understand that as she has had POD on the current chemo.   - further DMT to be discussed as an out pt pending d/c from [facility].   - pain control as needed

## 2023-02-02 NOTE — PROGRESS NOTE ADULT - SUBJECTIVE AND OBJECTIVE BOX
ORTHO PROGRESS NOTE     Pt seen and examined at bedside, denies SOB, CP, Dizziness. N/V/D /HA.  No significant overnight events. Reports continued cramping pain LLE however pt does not want to take prn opiate meds as she becomes drowsy    Vital Signs Last 24 Hrs  T(C): 36.9 (02 Feb 2023 02:00), Max: 37.1 (01 Feb 2023 20:24)  T(F): 98.4 (02 Feb 2023 02:00), Max: 98.8 (01 Feb 2023 20:24)  HR: 75 (02 Feb 2023 02:00) (75 - 87)  BP: 100/58 (02 Feb 2023 02:00) (94/55 - 119/54)  BP(mean): --  RR: 16 (02 Feb 2023 02:00) (16 - 18)  SpO2: 100% (02 Feb 2023 02:00) (96% - 100%)    Parameters below as of 02 Feb 2023 02:00  Patient On (Oxygen Delivery Method): room air        Gen: NAD, alert and oriented  Resp: Unlabored breathing  LLE: Dressing c/d/i       SILT DP/SP/ Denise/Saph/tib       5/5 EHL 5/5 FHL 5/5 TA 5/5 Gastroc        DP+,        soft compartments       no palpable  hematoma          Labs:  CBC Full  -  ( 01 Feb 2023 05:40 )  WBC Count : 5.16 K/uL  RBC Count : 4.48 M/uL  Hemoglobin : 11.6 g/dL  Hematocrit : 36.1 %  Platelet Count - Automated : 120 K/uL  Mean Cell Volume : 80.6 fL  Mean Cell Hemoglobin : 25.9 pg  Mean Cell Hemoglobin Concentration : 32.1 gm/dL  Auto Neutrophil # : x  Auto Lymphocyte # : x  Auto Monocyte # : x  Auto Eosinophil # : x  Auto Basophil # : x  Auto Neutrophil % : x  Auto Lymphocyte % : x  Auto Monocyte % : x  Auto Eosinophil % : x  Auto Basophil % : x      02-01    135  |  101  |  9   ----------------------------<  95  4.8   |  26  |  0.48<L>    Ca    9.4      01 Feb 2023 05:40  Phos  3.7     02-01  Mg     1.90     02-01            A/P  Pt is a 58y Female s/p L hip florina on 1/26. Patient stable     - Continue Pain control/ Analgesia  - DVT ppx  -PT/OT   -WBAT  - Dispo: ok to discharge to [facility].  - Follow up with Dr. Villa in 2 weeks.  -Rest of care per primary team

## 2023-02-03 ENCOUNTER — TRANSCRIPTION ENCOUNTER (OUTPATIENT)
Age: 58
End: 2023-02-03

## 2023-02-03 VITALS
DIASTOLIC BLOOD PRESSURE: 70 MMHG | HEART RATE: 89 BPM | TEMPERATURE: 98 F | OXYGEN SATURATION: 100 % | SYSTOLIC BLOOD PRESSURE: 108 MMHG | RESPIRATION RATE: 18 BRPM

## 2023-02-03 PROCEDURE — 99239 HOSP IP/OBS DSCHRG MGMT >30: CPT

## 2023-02-03 RX ADMIN — ONDANSETRON 4 MILLIGRAM(S): 8 TABLET, FILM COATED ORAL at 08:06

## 2023-02-03 RX ADMIN — Medication 975 MILLIGRAM(S): at 05:26

## 2023-02-03 RX ADMIN — Medication 1 UNIT(S): at 09:17

## 2023-02-03 RX ADMIN — Medication 2: at 09:16

## 2023-02-03 RX ADMIN — METHADONE HYDROCHLORIDE 10 MILLIGRAM(S): 40 TABLET ORAL at 05:26

## 2023-02-03 RX ADMIN — GABAPENTIN 300 MILLIGRAM(S): 400 CAPSULE ORAL at 05:26

## 2023-02-03 RX ADMIN — PANTOPRAZOLE SODIUM 40 MILLIGRAM(S): 20 TABLET, DELAYED RELEASE ORAL at 05:27

## 2023-02-03 RX ADMIN — Medication 975 MILLIGRAM(S): at 06:26

## 2023-02-03 RX ADMIN — POLYETHYLENE GLYCOL 3350 17 GRAM(S): 17 POWDER, FOR SOLUTION ORAL at 05:26

## 2023-02-03 NOTE — PROGRESS NOTE ADULT - SUBJECTIVE AND OBJECTIVE BOX
Patient is a 58y old  Female who presents with a chief complaint of Difficulty ambulating (03 Feb 2023 07:08)      SUBJECTIVE / OVERNIGHT EVENTS: Pt planned for discharge yesterday but transportation did not come. Per CM/SW, transportation arranged today to home.   Pt has no new complaints. Daughter at bedside. Discussed plan. Daughter requesting commode and hospital bed, script given to CM and sent out     ADDITIONAL REVIEW OF SYSTEMS:    MEDICATIONS  (STANDING):  acetaminophen     Tablet .. 975 milliGRAM(s) Oral every 8 hours  bisacodyl 5 milliGRAM(s) Oral at bedtime  chlorhexidine 2% Cloths 1 Application(s) Topical daily  cholecalciferol 400 Unit(s) Oral daily  dextrose 5%. 1000 milliLiter(s) (50 mL/Hr) IV Continuous <Continuous>  dextrose 5%. 1000 milliLiter(s) (100 mL/Hr) IV Continuous <Continuous>  dextrose 50% Injectable 25 Gram(s) IV Push once  dextrose 50% Injectable 12.5 Gram(s) IV Push once  dextrose 50% Injectable 25 Gram(s) IV Push once  enoxaparin Injectable 30 milliGRAM(s) SubCutaneous every 24 hours  gabapentin 300 milliGRAM(s) Oral three times a day  glucagon  Injectable 1 milliGRAM(s) IntraMuscular once  influenza   Vaccine 0.5 milliLiter(s) IntraMuscular once  insulin glargine Injectable (LANTUS) 4 Unit(s) SubCutaneous at bedtime  insulin lispro (ADMELOG) corrective regimen sliding scale   SubCutaneous three times a day before meals  insulin lispro (ADMELOG) corrective regimen sliding scale   SubCutaneous at bedtime  insulin lispro Injectable (ADMELOG) 1 Unit(s) SubCutaneous three times a day before meals  lidocaine   4% Patch 1 Patch Transdermal daily  methadone    Tablet 10 milliGRAM(s) Oral every 12 hours  montelukast 10 milliGRAM(s) Oral at bedtime  multivitamin 1 Tablet(s) Oral daily  ondansetron   Disintegrating Tablet 4 milliGRAM(s) Oral every 8 hours  pantoprazole    Tablet 40 milliGRAM(s) Oral before breakfast  polyethylene glycol 3350 17 Gram(s) Oral two times a day  povidone iodine 5% Nasal Swab 1 Application(s) Both Nostrils once  senna 2 Tablet(s) Oral at bedtime    MEDICATIONS  (PRN):  dextrose Oral Gel 15 Gram(s) Oral once PRN Blood Glucose LESS THAN 70 milliGRAM(s)/deciliter  meclizine 12.5 milliGRAM(s) Oral every 6 hours PRN Dizziness  naloxone 1 mG/mL Injection for Intranasal Use 0.4 milliGRAM(s) IntraNasal every 3 minutes PRN overdose  oxyCODONE    IR 10 milliGRAM(s) Oral every 4 hours PRN Severe Pain (7 - 10)  oxyCODONE    IR 5 milliGRAM(s) Oral every 4 hours PRN Moderate Pain (4 - 6)  prochlorperazine   Injectable 5 milliGRAM(s) IV Push every 8 hours PRN refractory nausea      CAPILLARY BLOOD GLUCOSE      POCT Blood Glucose.: 121 mg/dL (03 Feb 2023 12:25)  POCT Blood Glucose.: 184 mg/dL (03 Feb 2023 08:52)  POCT Blood Glucose.: 106 mg/dL (02 Feb 2023 22:14)  POCT Blood Glucose.: 182 mg/dL (02 Feb 2023 17:56)  POCT Blood Glucose.: 102 mg/dL (02 Feb 2023 13:32)    I&O's Summary    02 Feb 2023 07:01  -  03 Feb 2023 07:00  --------------------------------------------------------  IN: 800 mL / OUT: 500 mL / NET: 300 mL        PHYSICAL EXAM:  Vital Signs Last 24 Hrs  T(C): 36.6 (03 Feb 2023 05:11), Max: 36.9 (02 Feb 2023 18:07)  T(F): 97.9 (03 Feb 2023 05:11), Max: 98.5 (02 Feb 2023 18:07)  HR: 89 (03 Feb 2023 05:11) (74 - 89)  BP: 108/70 (03 Feb 2023 05:11) (90/58 - 108/70)  BP(mean): --  RR: 18 (03 Feb 2023 05:11) (16 - 18)  SpO2: 100% (03 Feb 2023 05:11) (98% - 100%)    Parameters below as of 03 Feb 2023 05:11  Patient On (Oxygen Delivery Method): room air    GENERAL: NAD, well-developed, lying in bed;  HEAD:  Atraumatic, Normocephalic, MMM  CHEST/LUNG: No use of accessory muscles, CTAB, breathing non-labored  COR: RR, no mrcg  ABD: Soft, ND/NT, +BS  PSYCH: AAOx3  NEUROLOGY: CN II-XII grossly intact, moving all extremities  SKIN: No rashes or lesions  EXT: wwp, no cce, surgical site on left with dressing, c/d/i         LABS:                      RADIOLOGY & ADDITIONAL TESTS:  Results Reviewed:   Imaging Personally Reviewed:  Electrocardiogram Personally Reviewed:    COORDINATION OF CARE:  Care Discussed with Consultants/Other Providers [Y/N]:  Prior or Outpatient Records Reviewed [Y/N]:

## 2023-02-03 NOTE — PROGRESS NOTE ADULT - NSPROGADDITIONALINFOA_GEN_ALL_CORE
Greater than 50 minutes spent with patient and on patient's care plan.
Time spent on discharge plannin min

## 2023-02-03 NOTE — DISCHARGE NOTE NURSING/CASE MANAGEMENT/SOCIAL WORK - NSDCFUADDAPPT_GEN_ALL_CORE_FT
Follow up with your primary care provider in 1-2 weeks of discharge.    Follow up with Dr. Navarro at [facility] on discharge.    Follow up with Dr. Mcarthur on Monday 2/13/2022 at 12pm.

## 2023-02-03 NOTE — PROGRESS NOTE ADULT - SUBJECTIVE AND OBJECTIVE BOX
Orthopedic Surgery Progress Note     S: Patient seen and examined today. No acute events overnight. Pain is well controlled. Denies f/c, chest pain, shortness of breath, dizziness.    MEDICATIONS  (STANDING):  acetaminophen     Tablet .. 975 milliGRAM(s) Oral every 8 hours  bisacodyl 5 milliGRAM(s) Oral at bedtime  chlorhexidine 2% Cloths 1 Application(s) Topical daily  cholecalciferol 400 Unit(s) Oral daily  dextrose 5%. 1000 milliLiter(s) (50 mL/Hr) IV Continuous <Continuous>  dextrose 5%. 1000 milliLiter(s) (100 mL/Hr) IV Continuous <Continuous>  dextrose 50% Injectable 25 Gram(s) IV Push once  dextrose 50% Injectable 12.5 Gram(s) IV Push once  dextrose 50% Injectable 25 Gram(s) IV Push once  enoxaparin Injectable 30 milliGRAM(s) SubCutaneous every 24 hours  gabapentin 300 milliGRAM(s) Oral three times a day  glucagon  Injectable 1 milliGRAM(s) IntraMuscular once  influenza   Vaccine 0.5 milliLiter(s) IntraMuscular once  insulin glargine Injectable (LANTUS) 4 Unit(s) SubCutaneous at bedtime  insulin lispro (ADMELOG) corrective regimen sliding scale   SubCutaneous three times a day before meals  insulin lispro (ADMELOG) corrective regimen sliding scale   SubCutaneous at bedtime  insulin lispro Injectable (ADMELOG) 1 Unit(s) SubCutaneous three times a day before meals  lidocaine   4% Patch 1 Patch Transdermal daily  methadone    Tablet 10 milliGRAM(s) Oral every 12 hours  montelukast 10 milliGRAM(s) Oral at bedtime  multivitamin 1 Tablet(s) Oral daily  ondansetron   Disintegrating Tablet 4 milliGRAM(s) Oral every 8 hours  pantoprazole    Tablet 40 milliGRAM(s) Oral before breakfast  polyethylene glycol 3350 17 Gram(s) Oral two times a day  povidone iodine 5% Nasal Swab 1 Application(s) Both Nostrils once  senna 2 Tablet(s) Oral at bedtime    MEDICATIONS  (PRN):  dextrose Oral Gel 15 Gram(s) Oral once PRN Blood Glucose LESS THAN 70 milliGRAM(s)/deciliter  meclizine 12.5 milliGRAM(s) Oral every 6 hours PRN Dizziness  naloxone 1 mG/mL Injection for Intranasal Use 0.4 milliGRAM(s) IntraNasal every 3 minutes PRN overdose  oxyCODONE    IR 10 milliGRAM(s) Oral every 4 hours PRN Severe Pain (7 - 10)  oxyCODONE    IR 5 milliGRAM(s) Oral every 4 hours PRN Moderate Pain (4 - 6)  prochlorperazine   Injectable 5 milliGRAM(s) IV Push every 8 hours PRN refractory nausea      Physical Exam:  Gen: NAD, alert and oriented  Resp: Unlabored breathing  LLE: Dressing c/d/i       SILT DP/SP/ Denise/Saph/tib       5/5 EHL 5/5 FHL 5/5 TA 5/5 Gastroc        DP+,        soft compartments       no palpable  hematoma    Vital Signs Last 24 Hrs  T(C): 36.6 (03 Feb 2023 05:11), Max: 36.9 (02 Feb 2023 18:07)  T(F): 97.9 (03 Feb 2023 05:11), Max: 98.5 (02 Feb 2023 18:07)  HR: 89 (03 Feb 2023 05:11) (72 - 89)  BP: 108/70 (03 Feb 2023 05:11) (83/49 - 108/70)  BP(mean): --  RR: 18 (03 Feb 2023 05:11) (16 - 18)  SpO2: 100% (03 Feb 2023 05:11) (98% - 100%)    Parameters below as of 03 Feb 2023 05:11  Patient On (Oxygen Delivery Method): room air        02-02-23 @ 07:01  -  02-03-23 @ 07:00  --------------------------------------------------------  IN: 800 mL / OUT: 500 mL / NET: 300 mL                    LABS:

## 2023-02-03 NOTE — PROGRESS NOTE ADULT - NUTRITIONAL ASSESSMENT
This patient has been assessed with a concern for Malnutrition and has been determined to have a diagnosis/diagnoses of Severe protein-calorie malnutrition.    This patient is being managed with:   Diet NPO after Midnight-     NPO Start Date: 25-Jan-2023   NPO Start Time: 23:59  Entered: Jan 25 2023  6:30AM    Diet NPO-  NPO for Procedure/Test     NPO Start Date: 25-Jan-2023   NPO Start Time: 23:59  Except Medications  Entered: Jan 24 2023  4:40PM    Diet Regular-  Halal  No Beef  No Pork  Supplement Feeding Modality:  Oral  Ensure Max Cans or Servings Per Day:  1       Frequency:  Three Times a day  Entered: Jan 9 2023  2:09PM    
This patient has been assessed with a concern for Malnutrition and has been determined to have a diagnosis/diagnoses of Severe protein-calorie malnutrition.    This patient is being managed with:   Diet Regular-  Halal  No Beef  No Pork  Supplement Feeding Modality:  Oral  Ensure Max Cans or Servings Per Day:  1       Frequency:  Three Times a day  Entered: Jan 9 2023  2:09PM    
This patient has been assessed with a concern for Malnutrition and has been determined to have a diagnosis/diagnoses of Severe protein-calorie malnutrition.    This patient is being managed with:   Diet NPO after Midnight-     NPO Start Date: 25-Jan-2023   NPO Start Time: 23:59  Entered: Jan 25 2023  7:06AM    Diet NPO after Midnight-     NPO Start Date: 25-Jan-2023   NPO Start Time: 23:59  Entered: Jan 25 2023  6:30AM    Diet NPO-  NPO for Procedure/Test     NPO Start Date: 25-Jan-2023   NPO Start Time: 23:59  Except Medications  Entered: Jan 24 2023  4:40PM    Diet Regular-  Halal  No Beef  No Pork  Supplement Feeding Modality:  Oral  Ensure Max Cans or Servings Per Day:  1       Frequency:  Three Times a day  Entered: Jan 9 2023  2:09PM    
This patient has been assessed with a concern for Malnutrition and has been determined to have a diagnosis/diagnoses of Severe protein-calorie malnutrition.    This patient is being managed with:   Diet Regular-  Halal  No Beef  No Pork  Supplement Feeding Modality:  Oral  Ensure Max Cans or Servings Per Day:  1       Frequency:  Three Times a day  Entered: Jan 9 2023  2:09PM    
This patient has been assessed with a concern for Malnutrition and has been determined to have a diagnosis/diagnoses of Severe protein-calorie malnutrition.    This patient is being managed with:   Diet NPO after Midnight-     NPO Start Date: 25-Jan-2023   NPO Start Time: 23:59  Entered: Jan 25 2023  7:06AM    Diet NPO after Midnight-     NPO Start Date: 25-Jan-2023   NPO Start Time: 23:59  Entered: Jan 25 2023  6:30AM    Diet NPO-  NPO for Procedure/Test     NPO Start Date: 25-Jan-2023   NPO Start Time: 23:59  Except Medications  Entered: Jan 24 2023  4:40PM    Diet Regular-  Halal  No Beef  No Pork  Supplement Feeding Modality:  Oral  Ensure Max Cans or Servings Per Day:  1       Frequency:  Three Times a day  Entered: Jan 9 2023  2:09PM    
This patient has been assessed with a concern for Malnutrition and has been determined to have a diagnosis/diagnoses of Severe protein-calorie malnutrition.    This patient is being managed with:   Diet Regular-  Halal  No Beef  No Pork  Supplement Feeding Modality:  Oral  Ensure Max Cans or Servings Per Day:  1       Frequency:  Three Times a day  Entered: Jan 9 2023  2:09PM    

## 2023-02-03 NOTE — DISCHARGE NOTE NURSING/CASE MANAGEMENT/SOCIAL WORK - PATIENT PORTAL LINK FT
You can access the FollowMyHealth Patient Portal offered by [facility] by registering at the following website: [email or web address]. By joining [employer]’s FollowMyHealth portal, you will also be able to view your health information using other applications (apps) compatible with our system.

## 2023-02-03 NOTE — PROGRESS NOTE ADULT - REASON FOR ADMISSION
Difficulty ambulating

## 2023-02-03 NOTE — PROGRESS NOTE ADULT - ASSESSMENT
A/P  Pt is a 58y Female s/p L hip florina on 1/26. Patient stable     -Continue Pain control/ Analgesia  -DVT ppx  -PT/OT   -WBAT  -Dispo: dispo today  -Follow up with Dr. Villa in 2 weeks.  -Rest of care per primary team

## 2023-02-03 NOTE — PROGRESS NOTE ADULT - PROBLEM SELECTOR PROBLEM 7
Asthma
Asthma
Hypertension
Asthma
Hypertension
Asthma
Asthma
Hypertension
Asthma
Asthma
Hypertension
Asthma
Asthma
Hypertension
Asthma
Hypertension
Hypertension
Asthma
Asthma
Hypertension
Hypertension
Asthma
Hypertension
Asthma
Asthma

## 2023-02-03 NOTE — PROGRESS NOTE ADULT - PROBLEM SELECTOR PROBLEM 6
GERD (gastroesophageal reflux disease)
GERD (gastroesophageal reflux disease)
Asthma
GERD (gastroesophageal reflux disease)
Asthma
GERD (gastroesophageal reflux disease)
Asthma
GERD (gastroesophageal reflux disease)
Asthma
GERD (gastroesophageal reflux disease)
Asthma
GERD (gastroesophageal reflux disease)
Asthma
Asthma
GERD (gastroesophageal reflux disease)
GERD (gastroesophageal reflux disease)
Asthma
GERD (gastroesophageal reflux disease)
Asthma
Asthma
GERD (gastroesophageal reflux disease)

## 2023-02-03 NOTE — PROGRESS NOTE ADULT - PROBLEM SELECTOR PLAN 6
- home med: protonix 40mg daily, sucralfate 1g BID  - c/w protonix  - avoid NSAIDs given recent c/f gastritis    Nausea, improving  - Scheduled zofran sl q 6 hrs, prn compazine IV
- home med: Singulair 10mg daily  - c/w home meds  - not in acute exacerbation
- home med: protonix 40mg daily, sucralfate 1g BID  - c/w protonix  - avoid NSAIDs given recent c/f gastritis    Nausea, improving  - Scheduled zofran sl q 6 hrs, prn compazine IV
- home med: Singulair 10mg daily  - c/w home meds  - not in acute exacerbation
- home med: protonix 40mg daily, sucralfate 1g BID  - c/w protonix  - avoid NSAIDs given recent c/f gastritis    Nausea, improving  - ATC Zofran 4mg q5h, prn compazine IV
- home med: Singulair 10mg daily  - c/w home meds  - not in acute exacerbation
- home med: protonix 40mg daily, sucralfate 1g BID  - c/w protonix  - avoid NSAIDs given recent c/f gastritis
- home med: Singulair 10mg daily  - c/w home meds  - not in acute exacerbation
- home med: protonix 40mg daily, sucralfate 1g BID  - c/w protonix  - avoid NSAIDs given recent c/f gastritis    Nausea, worsening  - Scheduled zofran sl q 6 hrs
- home med: protonix 40mg daily, sucralfate 1g BID  - c/w protonix  - avoid NSAIDs given recent c/f gastritis    Nausea, improving  - Scheduled zofran sl q 6 hrs, prn compazine IV
- home med: Singulair 10mg daily  - c/w home meds  - not in acute exacerbation
- home med: protonix 40mg daily, sucralfate 1g BID  - c/w protonix  - avoid NSAIDs given recent c/f gastritis    Nausea, improving  - ATC Zofran 4mg q5h, prn compazine IV
- home med: protonix 40mg daily, sucralfate 1g BID  - c/w protonix  - avoid NSAIDs given recent c/f gastritis    Nausea, improving  - Scheduled zofran sl q 6 hrs, prn compazina IV
- home med: protonix 40mg daily, sucralfate 1g BID  - c/w protonix  - avoid NSAIDs given recent c/f gastritis    Nausea, improving  - Scheduled zofran sl q 6 hrs, prn compazine IV
- home med: protonix 40mg daily, sucralfate 1g BID  - c/w protonix  - avoid NSAIDs given recent c/f gastritis    Nausea, improving  - Scheduled zofran sl q 6 hrs, prn compazine IV
- home med: Singulair 10mg daily  - c/w home meds  - not in acute exacerbation
- home med: protonix 40mg daily, sucralfate 1g BID  - c/w protonix  - avoid NSAIDs given recent c/f gastritis    Nausea, improving  - Scheduled zofran sl q 6 hrs, prn compazina IV
- home med: protonix 40mg daily, sucralfate 1g BID  - c/w protonix  - avoid NSAIDs given recent c/f gastritis    Nausea, worsening  - Scheduled zofran sl q 6 hrs
- home med: protonix 40mg daily, sucralfate 1g BID  - c/w protonix  - avoid NSAIDs given recent c/f gastritis    Nausea, improving  - ATC Zofran 4mg q5h, prn compazine IV
- home med: protonix 40mg daily, sucralfate 1g BID  - c/w protonix  - avoid NSAIDs given recent c/f gastritis    Nausea, improving  - Scheduled zofran sl q 6 hrs, prn compazina IV
- home med: Singulair 10mg daily  - c/w home meds  - not in acute exacerbation
- home med: protonix 40mg daily, sucralfate 1g BID  - c/w protonix  - avoid NSAIDs given recent c/f gastritis    Nausea, improving  - ATC Zofran 4mg q5h, prn compazine IV
- home med: protonix 40mg daily, sucralfate 1g BID  - c/w protonix  - avoid NSAIDs given recent c/f gastritis    Nausea, improving  - Scheduled zofran sl q 6 hrs, prn compazine IV
- home med: protonix 40mg daily, sucralfate 1g BID  - c/w protonix  - avoid NSAIDs given recent c/f gastritis
- home med: protonix 40mg daily, sucralfate 1g BID  - c/w protonix  - avoid NSAIDs given recent c/f gastritis
- home med: protonix 40mg daily, sucralfate 1g BID  - c/w protonix  - avoid NSAIDs given recent c/f gastritis    Nausea, worsening  - Scheduled zofran sl q 6 hrs
- home med: Singulair 10mg daily  - c/w home meds  - not in acute exacerbation
- home med: protonix 40mg daily, sucralfate 1g BID  - c/w protonix  - avoid NSAIDs given recent c/f gastritis    Nausea, improving  - ATC Zofran 4mg q5h, prn compazine IV
- home med: protonix 40mg daily, sucralfate 1g BID  - c/w protonix  - avoid NSAIDs given recent c/f gastritis    Nausea, improving  - ATC Zofran 4mg q5h, prn compazine IV
- home med: protonix 40mg daily, sucralfate 1g BID  - c/w protonix  - avoid NSAIDs given recent c/f gastritis    Nausea, improving  - Scheduled zofran sl q 6 hrs, prn compazine IV
- home med: protonix 40mg daily, sucralfate 1g BID  - c/w protonix  - avoid NSAIDs given recent c/f gastritis    Nausea, improving  - ATC Zofran 4mg q5h, prn compazine IV
- home med: protonix 40mg daily, sucralfate 1g BID  - c/w protonix  - avoid NSAIDs given recent c/f gastritis    Nausea, improving  - ATC Zofran 4mg q5h, prn compazine IV
- home med: protonix 40mg daily, sucralfate 1g BID  - c/w protonix  - avoid NSAIDs given recent c/f gastritis    Nausea, improving  - Scheduled zofran sl q 6 hrs, prn compazine IV

## 2023-02-03 NOTE — PROGRESS NOTE ADULT - PROVIDER SPECIALTY LIST ADULT
Hospitalist
Hospitalist
Orthopedics
Palliative Care
Palliative Care
Heme/Onc
Hospitalist
Orthopedics
Palliative Care
Heme/Onc
Heme/Onc
Hospitalist
Hospitalist
Orthopedics
Heme/Onc
Heme/Onc
Orthopedics
Palliative Care
Palliative Care
Hospitalist
Palliative Care
Hospitalist
Palliative Care
Hospitalist
Hospitalist
Palliative Care
Hospitalist
Hospitalist
Palliative Care
Hospitalist
Palliative Care
Palliative Care
Hospitalist
Palliative Care
Hospitalist
Palliative Care
Palliative Care
Hospitalist
Palliative Care
Hospitalist

## 2023-02-03 NOTE — PROGRESS NOTE ADULT - PROBLEM SELECTOR PROBLEM 3
Pancytopenia
Pancytopenia
Transaminitis
Nausea & vomiting
Pancytopenia
Debility
Pancytopenia
Nausea & vomiting
Nausea & vomiting
Pancytopenia
Pancytopenia
Transaminitis
Nausea & vomiting
Neoplasm related pain
Pancytopenia
Nausea & vomiting
Transaminitis
Nausea & vomiting
Pancytopenia
Transaminitis
Pancytopenia
Nausea & vomiting
Primary pancreatic cancer with metastasis to other site
Pancytopenia
Neoplasm related pain
Pancytopenia
Nausea & vomiting
Neoplasm related pain
Pancytopenia
Neoplasm related pain
Pancytopenia
Pancytopenia
Transaminitis
Transaminitis
Neoplasm related pain
Pancytopenia
Transaminitis
Pancytopenia
Transaminitis
Pancytopenia
Transaminitis
Transaminitis
Pancytopenia
Pancytopenia

## 2023-02-03 NOTE — PROGRESS NOTE ADULT - PROBLEM SELECTOR PROBLEM 1
Left thigh pain
Primary pancreatic cancer with metastasis to other site
Left thigh pain
Nausea & vomiting
Primary pancreatic cancer with metastasis to other site
Fracture of femoral neck, left
Left thigh pain
Primary pancreatic cancer with metastasis to other site
Fracture of femoral neck, left
Left thigh pain
Primary pancreatic cancer with metastasis to other site
Left thigh pain
Primary pancreatic cancer with metastasis to other site
Left thigh pain
Left thigh pain
Primary pancreatic cancer with metastasis to other site
Left thigh pain
Fracture of femoral neck, left
Neoplasm related pain
Primary pancreatic cancer with metastasis to other site
Fracture of femoral neck, left
Primary pancreatic cancer with metastasis to other site
Left thigh pain
Primary pancreatic cancer with metastasis to other site
Fracture of femoral neck, left
Left thigh pain

## 2023-02-03 NOTE — PROGRESS NOTE ADULT - PROBLEM SELECTOR PROBLEM 2
Primary pancreatic cancer with metastasis to other site
Neoplasm related pain
Primary pancreatic cancer with metastasis to other site
Primary pancreatic cancer with metastasis to other site
Pancytopenia
Primary pancreatic cancer with metastasis to other site
Debility
Neoplasm related pain
Pancytopenia
Primary pancreatic cancer with metastasis to other site
Pancytopenia
Pancytopenia
Primary pancreatic cancer with metastasis to other site
Primary pancreatic cancer with metastasis to other site
Neoplasm related pain
Pancytopenia
Neoplasm related pain
Neoplasm related pain
Pancytopenia
Neoplasm related pain
Neoplasm related pain
Primary pancreatic cancer with metastasis to other site
Neoplasm related pain
Primary pancreatic cancer with metastasis to other site
Neoplasm related pain
Primary pancreatic cancer with metastasis to other site
Primary pancreatic cancer with metastasis to other site
Neoplasm related pain
Pancytopenia
Primary pancreatic cancer with metastasis to other site
Pancytopenia
Primary pancreatic cancer with metastasis to other site
Primary pancreatic cancer with metastasis to other site
Pancytopenia
Pancytopenia
Primary pancreatic cancer with metastasis to other site

## 2023-02-03 NOTE — PROGRESS NOTE ADULT - PROBLEM SELECTOR PROBLEM 5
Type 2 diabetes mellitus
Encounter for palliative care
GERD (gastroesophageal reflux disease)
Type 2 diabetes mellitus
Encounter for palliative care
Type 2 diabetes mellitus
Type 2 diabetes mellitus
Encounter for palliative care
Type 2 diabetes mellitus
Type 2 diabetes mellitus
GERD (gastroesophageal reflux disease)
Type 2 diabetes mellitus
Encounter for palliative care
Encounter for palliative care
Type 2 diabetes mellitus
Encounter for palliative care
GERD (gastroesophageal reflux disease)
Type 2 diabetes mellitus
Encounter for palliative care
Encounter for palliative care
GERD (gastroesophageal reflux disease)
Type 2 diabetes mellitus
GERD (gastroesophageal reflux disease)
GERD (gastroesophageal reflux disease)
Type 2 diabetes mellitus
Encounter for palliative care
GERD (gastroesophageal reflux disease)
Type 2 diabetes mellitus
Type 2 diabetes mellitus
Encounter for palliative care
Type 2 diabetes mellitus
Type 2 diabetes mellitus
GERD (gastroesophageal reflux disease)
Type 2 diabetes mellitus
Type 2 diabetes mellitus
GERD (gastroesophageal reflux disease)
GERD (gastroesophageal reflux disease)
Type 2 diabetes mellitus

## 2023-02-03 NOTE — PROGRESS NOTE ADULT - PROBLEM SELECTOR PLAN 7
- not on meds  - currently normotensive
- home med: singulair 10mg daily  - c/w home med  - not in acute exacerbation
- home med: singulair 10mg daily  - c/w home med  - not in acute exacerbation
- not on meds  - currently normotensive
- home med: Singulair 10mg daily  - c/w home meds  - not in acute exacerbation
- not on meds  - currently normotensive
- home med: singulair 10mg daily  - c/w home med  - not in acute exacerbation
- not on meds  - currently normotensive
- home med: singulair 10mg daily  - c/w home med  - not in acute exacerbation
- not on meds  - currently normotensive
- home med: Singulair 10mg daily  - c/w home meds  - not in acute exacerbation
- home med: singulair 10mg daily  - c/w home med  - not in acute exacerbation
- home med: Singulair 10mg daily  - c/w home meds  - not in acute exacerbation
- home med: singulair 10mg daily  - c/w home med  - not in acute exacerbation
- not on meds  - currently normotensive
- home med: Singulair 10mg daily  - c/w home meds  - not in acute exacerbation
- home med: singulair 10mg daily  - c/w home med  - not in acute exacerbation
- home med: singulair 10mg daily  - c/w home meds  - not in acute exacerbation
- home med: singulair 10mg daily  - c/w home med  - not in acute exacerbation
- not on meds  - currently normotensive
- not on meds  - currently normotensive
- home med: singulair 10mg daily  - c/w home med  - not in acute exacerbation
- home med: Singulair 10mg daily  - c/w home meds  - not in acute exacerbation
- home med: singulair 10mg daily  - c/w home med  - not in acute exacerbation
- home med: singulair 10mg daily  - c/w home med  - not in acute exacerbation
- not on meds  - currently normotensive
- home med: Singulair 10mg daily  - c/w home meds  - not in acute exacerbation
- not on meds  - currently normotensive
- home med: Singulair 10mg daily  - c/w home meds  - not in acute exacerbation

## 2023-02-04 ENCOUNTER — OUTPATIENT (OUTPATIENT)
Dept: OUTPATIENT SERVICES | Facility: HOSPITAL | Age: 58
LOS: 1 days | Discharge: HOME | End: 2023-02-04

## 2023-02-04 DIAGNOSIS — Z98.890 OTHER SPECIFIED POSTPROCEDURAL STATES: Chronic | ICD-10-CM

## 2023-02-04 DIAGNOSIS — Z90.410 ACQUIRED TOTAL ABSENCE OF PANCREAS: Chronic | ICD-10-CM

## 2023-02-04 DIAGNOSIS — K92.2 GASTROINTESTINAL HEMORRHAGE, UNSPECIFIED: Chronic | ICD-10-CM

## 2023-02-04 DIAGNOSIS — C24.1 MALIGNANT NEOPLASM OF AMPULLA OF VATER: ICD-10-CM

## 2023-02-04 DIAGNOSIS — Z51.11 ENCOUNTER FOR ANTINEOPLASTIC CHEMOTHERAPY: Chronic | ICD-10-CM

## 2023-02-06 LAB — SURGICAL PATHOLOGY STUDY: SIGNIFICANT CHANGE UP

## 2023-02-08 ENCOUNTER — APPOINTMENT (OUTPATIENT)
Dept: HEMATOLOGY ONCOLOGY | Facility: CLINIC | Age: 58
End: 2023-02-08
Payer: MEDICAID

## 2023-02-08 PROCEDURE — ZZZZZ: CPT

## 2023-02-09 ENCOUNTER — NON-APPOINTMENT (OUTPATIENT)
Age: 58
End: 2023-02-09

## 2023-02-10 ENCOUNTER — NON-APPOINTMENT (OUTPATIENT)
Age: 58
End: 2023-02-10

## 2023-02-13 ENCOUNTER — APPOINTMENT (OUTPATIENT)
Dept: INFUSION THERAPY | Facility: HOSPITAL | Age: 58
End: 2023-02-13

## 2023-02-13 ENCOUNTER — APPOINTMENT (OUTPATIENT)
Dept: GERIATRICS | Facility: CLINIC | Age: 58
End: 2023-02-13
Payer: MEDICAID

## 2023-02-13 PROCEDURE — 99213 OFFICE O/P EST LOW 20 MIN: CPT | Mod: 95

## 2023-02-16 ENCOUNTER — NON-APPOINTMENT (OUTPATIENT)
Age: 58
End: 2023-02-16

## 2023-02-17 ENCOUNTER — NON-APPOINTMENT (OUTPATIENT)
Age: 58
End: 2023-02-17

## 2023-02-17 NOTE — HISTORY OF PRESENT ILLNESS
[Disease: _____________________] : Disease: [unfilled] [T: ___] : T[unfilled] [N: ___] : N[unfilled] [M: ___] : M[unfilled] [AJCC Stage: ____] : AJCC Stage: [unfilled] [Home] : at home, [unfilled] , at the time of the visit. [Other Location: e.g. 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 \par \par Initially was admitted to [facility] on 12/18/18 with dizziness, found to have elevated LFTs. MRCP demonstrated a dilatation of the CBD. ERCP on 12/22/18 performed c/w ampullary mass with obstruction. Pathology c/w adenocarcinoma. Pt was discharged for out pt follow up with surg onc and med onc but was re-admitted on 1/5/19 with n/v, worsening abdominal pain, found to have severe sepsis with E coli bacteremia 2/2 acute cholangitis. \par 1/7: ERCP performed. Stent was removed from the biliary tree. One plastic stent placed\par 1/5 CT A/P Heterogeneity of the right hepatic lobe with questionable 1.3 cm hypoattenuating lesion in segment 7.\par 1/8 MRI Abd: No suspicious liver lesions. No abnormality is identified to correspond with questionable lesion identified on prior CT. 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  Taken to the OR for evacuation of hematoma, cessation of gastroduodenal artery bleeding with two hemostatic stitches, and placement of abthera VAC.  Required pressors in the SICU, then developed fever. \par 1/21-1/22: OR exploratory laparotomy, drainage of abdominal abscess, repair of abdominal wall hernia with Strattice mesh, b/ abdominal wall advancement flaps. Post-op patient remained intubated and off vasopressors. FENA calculated to be pre-renal. 500CC albumin bolus given, urine output improved. 1/22/19 attempted spontaneous breathing trials but pt hypoxic. Pt lactate uptrending, resuscitated w/IVF, and pt hypotensive requiring pressors. UCx growing candida, Diflucan changed to micafungin to r/o resistant organisms in the setting of worsening sepsis. \par 1/23/19, patient noted to have blood oozing from midline incision while hypotensive and on pressors. Peak pressures noted to be elevated on vent and abdomen increasingly distended. All staples removed and a large amount of clot evacuated from midline incision. 1U PRBC given. No active bleeding appreciated. Fascia remains closed. Wound packed with wet to dry dressing. Vitamin K given for elevated INR. \par 1/24-1/29: remained in SICU, started on TPN, diuresed, finally extubated. \par 2/1: septic shock again, pressors restarted\par 2/2-2/11: CT scan with peritonitis, new abscesses. Ab and Antifungals started. Wound with increase in drainage. Octreotide started for increased output \par 2/12: drainage of abscesses by IR.  \par 2/18: Wound was partially closed by plastics with interrupted sutures and an ostomy appliance was placed, VAC removed.\par 3/1/19. 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 Completed RT on 1/24 x 5 sessions. L hemiarthroplasty on 1/23. Recommendation for FARIBA but pt went home due to preference. \par \par \par  [de-identified] : adenocarcinoma [de-identified] : CA [id number] [de-identified] : FINAL PATHOLOGY:\par -Invasive adenocarcinoma of the duodenal ampulla (Tumor size: 2.5 x 1.5 x 0.8 cm); Tumor stage: pT3b pN1\par -Vascular and perineural invasion identified\par -1/24 lymph nodes involved by adenocarcinoma\par -Negative margins\par -S/p cholecystectomy ; acute cholecystitis with cholelithiasis \par -Bile duct margin with acute inflammation and focal atypia consistent with low grade dysplasia. \par  [FreeTextEntry1] : s/p hip stabilization surgery  [de-identified] : spoke to pt;s daughter who translated, pt on speaker phone. since coming home from hospital, unable to walk, requires assistance with all ADLs. not eating well. Pain controlled with oxycodone 10 mg. Does not have home care set up from the hospital, needs extra help. daughter works as her aid.

## 2023-02-17 NOTE — REVIEW OF SYSTEMS
[Fatigue] : fatigue [Joint Pain] : joint pain [Difficulty Walking] : difficulty walking [Negative] : Allergic/Immunologic

## 2023-02-21 ENCOUNTER — NON-APPOINTMENT (OUTPATIENT)
Age: 58
End: 2023-02-21

## 2023-02-22 ENCOUNTER — NON-APPOINTMENT (OUTPATIENT)
Age: 58
End: 2023-02-22

## 2023-02-22 ENCOUNTER — APPOINTMENT (OUTPATIENT)
Dept: ORTHOPEDIC SURGERY | Facility: CLINIC | Age: 58
End: 2023-02-22
Payer: MEDICAID

## 2023-02-22 VITALS — OXYGEN SATURATION: 98 % | DIASTOLIC BLOOD PRESSURE: 69 MMHG | SYSTOLIC BLOOD PRESSURE: 114 MMHG | HEART RATE: 80 BPM

## 2023-02-22 PROCEDURE — 73502 X-RAY EXAM HIP UNI 2-3 VIEWS: CPT

## 2023-02-22 PROCEDURE — 99024 POSTOP FOLLOW-UP VISIT: CPT

## 2023-02-22 NOTE — HISTORY OF PRESENT ILLNESS
[Doing Well] : is doing well [No Sign of Infection] : is showing no signs of infection [Adequate Pain Control] : has adequate pain control [de-identified] : 1/26/2023 -left hip hemiarthroplasty for pathologic fracture in the setting of ampullary carcinoma [de-identified] : Patient is in significantly less pain than prior.  She is sitting in the wheelchair is more active.  She is not walking much because of physical therapy not coming to the house as well as because of having a long time is not walking.  She is in less pain than before.  She still has some right-sided pain. [de-identified] : On exam incisions clean dry and intact.  She has 1 small area near the right ischium.  No evidence of skin breakdown.  They have been putting triamcinolone cream on this however is still not yet healed.  She is able to move the foot appropriately and she has no calf tenderness or swelling.  Her leg lengths are equal. [de-identified] : Pathology is consistent with metastatic adenocarcinoma consistent with patient's known history of ampullary carcinoma.\par \par X-rays today AP pelvis and views of the left hip show the cemented hemiarthroplasty in good position.  There is obvious tumor at the calcar where there is cement supporting this. [de-identified] : 1 month status post hemiarthroplasty.  I recommended continued physical therapy for weightbearing as tolerated.  We discussed posterior hip precautions.  I discussed not using steroid cream on the healing wound.  Recommended antibiotic cream instead. [de-identified] : Follow-up again in 2 months.\par \par If imaging was ordered, the patient was told to make an appointment to review findings right after all imaging is completed.\par \par We discussed risks, benefits and alternatives. Rationale of care was reviewed and all questions were answered. Patient (and family) had all questions answered to her degree of the level of satisfaction. Patient (and family) expressed understanding and interest in proceeding with the plan as outlined.\par \par \par \par \par This note was done with a voice recognition transcription software and any typos are related to this rather than medical error. Surgical risks reviewed. Patient (and family) had all questions answered to an agreeable level of satisfaction. Patient (and family) expressed understanding and interest in proceeding with the plan as outlined.  \par

## 2023-02-24 NOTE — DISCHARGE NOTE ADULT - PATIENT PORTAL LINK FT
Addended by: DAYNA PEARSON on: 2/23/2023 06:18 PM     Modules accepted: Orders    
You can access the [facility] Patient Portal, offered by [facility], by registering with the following website: [email or web address]

## 2023-02-27 ENCOUNTER — APPOINTMENT (OUTPATIENT)
Dept: INFUSION THERAPY | Facility: HOSPITAL | Age: 58
End: 2023-02-27

## 2023-02-27 NOTE — HISTORY OF PRESENT ILLNESS
[FreeTextEntry1] : 58yoF with ampullary pancreaticobiliary carcinoma presents for follow up visit.\par PMH significant for DM on Metformin, HTN. \par \par Patient preferred language is Czech. She requests that her daughter Olegario speak on her behalf. \par \par Patient originally diagnosed with ampullary carcinoma 12/2018 after presenting with dizziness, found to have elevated LFTs. MRCP demonstrated a dilatation of the CBD. ERCP on 12/22/18 performed c/w ampullary mass with obstruction. Pathology c/w adenocarcinoma. On 1/11/19 pt underwent ex-lap, whipple pancreaticoduodenectomy for duodenal adenocarcinoma, post-op course c/b hemorrhagic shock, drop in H/H to 6/19 lactate 9. Taken to the OR for evacuation of hematoma, cessation of gastroduodenal artery bleeding with two hemostatic stitches, and placement of abthera VAC. Course further complicated by infection and prolonged SICU stay requiring intubation. \par \par 4/30-10/21/19: Received 8 cycles Gemzar/Xeloda \par 1/20-11/2020: prolonged stay in [facility] due to COVID \par 1/21/22: ventral hernia repair \par 3/10/22: CT abd/pel: significant interval improvement of previously described rectus/anterior abdominal wall hematoma, previously described focal hematoma at the left anterior abdominal wall is decreased in size\par 7/1/22: PET/CT: Masslike consolidation in the anterior left upper lung with intense uptake and left paratracheal lymph node, small focus of uptake in the right hepatic lobe near the severino hepatis suspicious for metastatic lesion.\par 7/19-7/22/22: [facility] admission s/p bronchoscopy 7/20/22; 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  Has been very fatigued and weak. \par \par 9/7/22 Interval History: Patient's tx was held due to low ANC count. She continued to report persistent cough (uses Robitussin and Promethazine/Codeine cough syrup 10 ml q 4-6 hrs, it provides mild/temporary relief). Her fatigue, peripheral neuropathy of b/l hands + feet, chest and back pain remain unchanged (uses Oxycodone PRN). Patient's daughter is making a conscious effort to reinforce her oral intake (stable weight noted today). Her bowel movements remain regular (normal in color, consistency, frequency). \par \par \par Main reason for which patient is referred is pain and symptom management. \par She has been experiencing pain and burning in the upper abdomen, always present. No relation to eating. \par She uses Oxycodone IR 5mg on a PRN basis, is using it 1-2 times daily. Daughter notes that the pain spikes in the 2-4 days after chemotherapy. Pt rates her pain at 8/10 at its worst. Presently rates it at 0. \par \par Interval Hx (2/13/23):\par Patient seen via telemedicine. \par She was recently hospitalized at [facility]; underwent surgery- L hemiarthroplasty on 1/23. Completed RT on 1/24 x 5 sessions.  Has been back home since 2/3/23. \par She remains quite weak, is dependent on help from her family to get up and out of bed. \par She uses a bedpan or bedside commode. \par Daughter states she has a medical cannabis card for the patient but has not purchased any as of yet. They would like to use it to help with appetite. \par \par ROS:\par +weakness - \par +weight loss - down to about 95lbs most recently, per daughter. \par +chest/back pain - she uses methadone and oxycodone IR 5mg\par +low appetite/ early satiety - daughter states that pt mainly eats rice, soup.  Pt desires very little other food.  She drinks ~1 Glucerna daily. \par +chronic cough - Promethazine/Codeine syrup\par +nausea, no vomiting - uses metoclopramide PRN \par +trouble sleeping at night \par +constipation - takes miralax as needed, senna and dulcolax daily.  Last BM was yesterday. Averages a BM every 1-2 days. \par +cough - uses promethazine-codeine syrup PRN, Guaifenesin PRN - these help\par +generalized itching \par Denies mood changes, anxiety.  \par \par Ambulates with the assistance of a cane. She has a shower chair. No recent falls reported. \par \par Patient is , lives with her  and two children (ages 32, 20) in an apartment in the [place]. She ambulates with a cane when needed. Her daughter is her HHA through the [facility] providing 56 hours of care a week. Her daughter assists with her ADL's. Pt spends her day reading and watching TV. She feels supported by family. \par \par PCP: Dr. Marisol Tripp ([place]) \par \par I-STOP Ref#:  [id number]

## 2023-02-27 NOTE — DATA REVIEWED
[FreeTextEntry1] : CT C/A/P  (12/23/2022): \par \par COMPARISON: CT scan of the chest from 10/25/2022 and CT scan of the abdomen and pelvis from 10/22/2022\par \par FINDINGS:\par CHEST:\par LUNGS AND LARGE AIRWAYS: Patent central airways. Patchy opacities in the left upper lobe and superior segment of the left lower lobe raising concern for infection. The more diffuse groundglass appearance in the left upper lobe on the prior study is no longer visualized. Mild atelectatic changes. Motion artifact slightly limits fine evaluation of the lung parenchyma. Previously described left upper lobe paramediastinal mass is not identified. Atelectatic changes are noted in the adjacent lung in this region.\par PLEURA: Trace left pleural fluid. This is decreased.\par VESSELS: Right-sided central line with its tip in the right atrium.\par HEART: Heart size is normal. No pericardial effusion.\par MEDIASTINUM AND MAXIM: No significantly enlarged lymphadenopathy.\par CHEST WALL AND LOWER NECK: Within normal limits.\par \par ABDOMEN AND PELVIS:\par LIVER: Within normal limits.\par BILE DUCTS: Normal caliber.\par GALLBLADDER: Cholecystectomy clips.\par SPLEEN: Within normal limits.\par PANCREAS: Status post Whipple procedure with postsurgical changes.\par ADRENALS: Within normal limits.\par KIDNEYS/URETERS: Within normal limits.\par \par BLADDER: Underdistended bladder.\par REPRODUCTIVE ORGANS: Retroverted uterus.\par \par BOWEL: No bowel obstruction. Appendix within normal limits.\par PERITONEUM: No ascites.\par VESSELS: Within normal limits.\par RETROPERITONEUM/LYMPH NODES: No lymphadenopathy.\par ABDOMINAL WALL: Postsurgical changes in the anterior abdominal wall.\par BONES: Mild degenerative changes of bone.\par \par IMPRESSION:\par No gross evidence for recurrent or metastatic disease.\par \par Focal patchy opacities in the superior segment of the left lower lobe not seen previously and in the left upper lobe, raising concern for infection. Previously visualized more diffuse groundglass opacity in the left upper lobe is no longer identified. Please correlate clinically. Trace left pleural fluid has decreased.

## 2023-03-02 ENCOUNTER — APPOINTMENT (OUTPATIENT)
Dept: GASTROENTEROLOGY | Facility: CLINIC | Age: 58
End: 2023-03-02

## 2023-03-02 NOTE — PROGRESS NOTE ADULT - NSHPATTENDINGPLANDISCUSS_GEN_ALL_CORE
abscess
Dr Person
NST
SICU team
William (surg onc)
SICU team
Surg Onc (On license of [facility])
SICU team
Steffen (surg onc)
Surg Onc
SICU team
SICU
SICU team
William (surg onc)
Patient, Family, 9SCMB, SW/CM
Patient, Family, 9SCMB, SW/CM

## 2023-03-07 ENCOUNTER — APPOINTMENT (OUTPATIENT)
Dept: CT IMAGING | Facility: IMAGING CENTER | Age: 58
End: 2023-03-07
Payer: MEDICAID

## 2023-03-07 ENCOUNTER — OUTPATIENT (OUTPATIENT)
Dept: OUTPATIENT SERVICES | Facility: HOSPITAL | Age: 58
LOS: 1 days | End: 2023-03-07
Payer: MEDICAID

## 2023-03-07 ENCOUNTER — APPOINTMENT (OUTPATIENT)
Dept: HEMATOLOGY ONCOLOGY | Facility: CLINIC | Age: 58
End: 2023-03-07
Payer: MEDICAID

## 2023-03-07 VITALS
OXYGEN SATURATION: 99 % | WEIGHT: 92.15 LBS | HEART RATE: 78 BPM | TEMPERATURE: 97.4 F | RESPIRATION RATE: 18 BRPM | SYSTOLIC BLOOD PRESSURE: 112 MMHG | DIASTOLIC BLOOD PRESSURE: 69 MMHG | BODY MASS INDEX: 18 KG/M2

## 2023-03-07 DIAGNOSIS — M54.50 LOW BACK PAIN, UNSPECIFIED: ICD-10-CM

## 2023-03-07 DIAGNOSIS — Z51.11 ENCOUNTER FOR ANTINEOPLASTIC CHEMOTHERAPY: Chronic | ICD-10-CM

## 2023-03-07 DIAGNOSIS — Z00.8 ENCOUNTER FOR OTHER GENERAL EXAMINATION: ICD-10-CM

## 2023-03-07 DIAGNOSIS — Z90.410 ACQUIRED TOTAL ABSENCE OF PANCREAS: Chronic | ICD-10-CM

## 2023-03-07 DIAGNOSIS — K92.2 GASTROINTESTINAL HEMORRHAGE, UNSPECIFIED: Chronic | ICD-10-CM

## 2023-03-07 DIAGNOSIS — Z98.890 OTHER SPECIFIED POSTPROCEDURAL STATES: Chronic | ICD-10-CM

## 2023-03-07 DIAGNOSIS — C24.1 MALIGNANT NEOPLASM OF AMPULLA OF VATER: ICD-10-CM

## 2023-03-07 PROCEDURE — 74177 CT ABD & PELVIS W/CONTRAST: CPT | Mod: 26

## 2023-03-07 PROCEDURE — 74177 CT ABD & PELVIS W/CONTRAST: CPT

## 2023-03-07 PROCEDURE — 71260 CT THORAX DX C+: CPT | Mod: 26

## 2023-03-07 PROCEDURE — 71260 CT THORAX DX C+: CPT

## 2023-03-07 PROCEDURE — 99215 OFFICE O/P EST HI 40 MIN: CPT

## 2023-03-07 RX ORDER — PROMETHAZINE HYDROCHLORIDE AND CODEINE PHOSPHATE 6.25; 1 MG/5ML; MG/5ML
6.25-1 SOLUTION ORAL
Qty: 1 | Refills: 0 | Status: DISCONTINUED | COMMUNITY
Start: 2022-06-30 | End: 2023-03-07

## 2023-03-07 RX ORDER — LIDOCAINE AND PRILOCAINE 25; 25 MG/G; MG/G
2.5-2.5 CREAM TOPICAL
Qty: 1 | Refills: 1 | Status: ACTIVE | COMMUNITY
Start: 2023-03-07 | End: 1900-01-01

## 2023-03-07 RX ORDER — OXYCODONE 10 MG/1
10 TABLET ORAL EVERY 6 HOURS
Qty: 60 | Refills: 0 | Status: ACTIVE | COMMUNITY
Start: 2022-08-18 | End: 1900-01-01

## 2023-03-08 PROBLEM — M54.50 LOW BACK PAIN: Status: ACTIVE | Noted: 2023-03-08

## 2023-03-08 NOTE — PHYSICAL EXAM
[Capable of only limited self care, confined to bed or chair more than 50% of waking hours] : Status 3- Capable of only limited self care, confined to bed or chair more than 50% of waking hours [Normal] : affect appropriate [de-identified] : TTP mid spine L4-L5 [de-identified] : b/l LE weakness, L >R (post surgery), patellar reflexes are intact

## 2023-03-08 NOTE — HISTORY OF PRESENT ILLNESS
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 \par \par Initially was admitted to hospitals on 12/18/18 with dizziness, found to have elevated LFTs. MRCP demonstrated a dilatation of the CBD. ERCP on 12/22/18 performed c/w ampullary mass with obstruction. Pathology c/w adenocarcinoma. Pt was discharged for out pt follow up with surg onc and med onc but was re-admitted on 1/5/19 with n/v, worsening abdominal pain, found to have severe sepsis with E coli bacteremia 2/2 acute cholangitis. \par 1/7: ERCP performed. Stent was removed from the biliary tree. One plastic stent placed\par 1/5 CT A/P Heterogeneity of the right hepatic lobe with questionable 1.3 cm hypoattenuating lesion in segment 7.\par 1/8 MRI Abd: No suspicious liver lesions. No abnormality is identified to correspond with questionable lesion identified on prior CT. 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  Taken to the OR for evacuation of hematoma, cessation of gastroduodenal artery bleeding with two hemostatic stitches, and placement of abthera VAC.  Required pressors in the SICU, then developed fever. \par 1/21-1/22: OR exploratory laparotomy, drainage of abdominal abscess, repair of abdominal wall hernia with Strattice mesh, b/ abdominal wall advancement flaps. Post-op patient remained intubated and off vasopressors. FENA calculated to be pre-renal. 500CC albumin bolus given, urine output improved. 1/22/19 attempted spontaneous breathing trials but pt hypoxic. Pt lactate uptrending, resuscitated w/IVF, and pt hypotensive requiring pressors. UCx growing candida, Diflucan changed to micafungin to r/o resistant organisms in the setting of worsening sepsis. \par 1/23/19, patient noted to have blood oozing from midline incision while hypotensive and on pressors. Peak pressures noted to be elevated on vent and abdomen increasingly distended. All staples removed and a large amount of clot evacuated from midline incision. 1U PRBC given. No active bleeding appreciated. Fascia remains closed. Wound packed with wet to dry dressing. Vitamin K given for elevated INR. \par 1/24-1/29: remained in SICU, started on TPN, diuresed, finally extubated. \par 2/1: septic shock again, pressors restarted\par 2/2-2/11: CT scan with peritonitis, new abscesses. Ab and Antifungals started. Wound with increase in drainage. Octreotide started for increased output \par 2/12: drainage of abscesses by IR.  \par 2/18: Wound was partially closed by plastics with interrupted sutures and an ostomy appliance was placed, VAC removed.\par 3/1/19. 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 Completed RT on 1/24 x 5 sessions. L hemiarthroplasty on 1/23. Recommendation for FARIBA but pt went home due to preference. \par 3/7/23: CT CAP: s/p whipple, no evidence of local recurrence, adenopathy in mediastinum, improvement in YUNI airspace opacity, soft tissue nodule in abdominal scar concerning for metastasis. \par \par \par  [de-identified] : adenocarcinoma [de-identified] : CA [id number] [id number] [de-identified] : FINAL PATHOLOGY:\par -Invasive adenocarcinoma of the duodenal ampulla (Tumor size: 2.5 x 1.5 x 0.8 cm); Tumor stage: pT3b pN1\par -Vascular and perineural invasion identified\par -1/24 lymph nodes involved by adenocarcinoma\par -Negative margins\par -S/p cholecystectomy ; acute cholecystitis with cholelithiasis \par -Bile duct margin with acute inflammation and focal atypia consistent with low grade dysplasia. \par  [FreeTextEntry1] : off chemo  [de-identified] : c/o acute LBP x 5 days, upper sacral pain, midline radiating to the left and right paraspinal area. Difficult to evaluate LE strength as pt is still recovering from hip surgery. Is ambulating with walker now. Needs help with ADLs. Pain is worse with cough or sneeze, movement. Improved with laying down. Oxycodone 10 mg has not been very effective. Appetite is at baseline. Continues to lose weight. Hip pain has improved. No cough or SOB. \par

## 2023-03-08 NOTE — REVIEW OF SYSTEMS
[Fatigue] : fatigue [Recent Change In Weight] : ~T recent weight change [Joint Pain] : joint pain [Muscle Weakness] : muscle weakness [Difficulty Walking] : difficulty walking [Negative] : Allergic/Immunologic

## 2023-03-09 ENCOUNTER — OUTPATIENT (OUTPATIENT)
Dept: OUTPATIENT SERVICES | Facility: HOSPITAL | Age: 58
LOS: 1 days | End: 2023-03-09
Payer: MEDICAID

## 2023-03-09 ENCOUNTER — APPOINTMENT (OUTPATIENT)
Dept: MRI IMAGING | Facility: IMAGING CENTER | Age: 58
End: 2023-03-09
Payer: MEDICAID

## 2023-03-09 DIAGNOSIS — K92.2 GASTROINTESTINAL HEMORRHAGE, UNSPECIFIED: Chronic | ICD-10-CM

## 2023-03-09 DIAGNOSIS — Z00.8 ENCOUNTER FOR OTHER GENERAL EXAMINATION: ICD-10-CM

## 2023-03-09 DIAGNOSIS — Z98.890 OTHER SPECIFIED POSTPROCEDURAL STATES: Chronic | ICD-10-CM

## 2023-03-09 PROCEDURE — A9585: CPT

## 2023-03-09 PROCEDURE — 72158 MRI LUMBAR SPINE W/O & W/DYE: CPT | Mod: 26

## 2023-03-09 PROCEDURE — 72158 MRI LUMBAR SPINE W/O & W/DYE: CPT

## 2023-03-13 ENCOUNTER — RESULT REVIEW (OUTPATIENT)
Age: 58
End: 2023-03-13

## 2023-03-13 ENCOUNTER — APPOINTMENT (OUTPATIENT)
Dept: GERIATRICS | Facility: CLINIC | Age: 58
End: 2023-03-13
Payer: MEDICAID

## 2023-03-13 ENCOUNTER — APPOINTMENT (OUTPATIENT)
Dept: INFUSION THERAPY | Facility: HOSPITAL | Age: 58
End: 2023-03-13

## 2023-03-13 VITALS
OXYGEN SATURATION: 98 % | RESPIRATION RATE: 16 BRPM | WEIGHT: 92 LBS | SYSTOLIC BLOOD PRESSURE: 101 MMHG | DIASTOLIC BLOOD PRESSURE: 61 MMHG | HEART RATE: 77 BPM | BODY MASS INDEX: 17.97 KG/M2 | TEMPERATURE: 96.7 F

## 2023-03-13 LAB
ALBUMIN SERPL ELPH-MCNC: 3.6 G/DL — SIGNIFICANT CHANGE UP (ref 3.3–5)
ALP SERPL-CCNC: 293 U/L — HIGH (ref 40–120)
ALT FLD-CCNC: 29 U/L — SIGNIFICANT CHANGE UP (ref 10–45)
ANION GAP SERPL CALC-SCNC: 10 MMOL/L — SIGNIFICANT CHANGE UP (ref 5–17)
AST SERPL-CCNC: 45 U/L — HIGH (ref 10–40)
BASOPHILS # BLD AUTO: 0.02 K/UL — SIGNIFICANT CHANGE UP (ref 0–0.2)
BASOPHILS NFR BLD AUTO: 0.4 % — SIGNIFICANT CHANGE UP (ref 0–2)
BILIRUB SERPL-MCNC: 0.6 MG/DL — SIGNIFICANT CHANGE UP (ref 0.2–1.2)
BUN SERPL-MCNC: 13 MG/DL — SIGNIFICANT CHANGE UP (ref 7–23)
CALCIUM SERPL-MCNC: 9.2 MG/DL — SIGNIFICANT CHANGE UP (ref 8.4–10.5)
CANCER AG19-9 SERPL-ACNC: 4486 U/ML — HIGH
CHLORIDE SERPL-SCNC: 102 MMOL/L — SIGNIFICANT CHANGE UP (ref 96–108)
CO2 SERPL-SCNC: 26 MMOL/L — SIGNIFICANT CHANGE UP (ref 22–31)
CREAT SERPL-MCNC: 0.39 MG/DL — LOW (ref 0.5–1.3)
EGFR: 116 ML/MIN/1.73M2 — SIGNIFICANT CHANGE UP
EOSINOPHIL # BLD AUTO: 0.11 K/UL — SIGNIFICANT CHANGE UP (ref 0–0.5)
EOSINOPHIL NFR BLD AUTO: 2.4 % — SIGNIFICANT CHANGE UP (ref 0–6)
GLUCOSE SERPL-MCNC: 186 MG/DL — HIGH (ref 70–99)
HCT VFR BLD CALC: 32.2 % — LOW (ref 34.5–45)
HGB BLD-MCNC: 10.5 G/DL — LOW (ref 11.5–15.5)
IMM GRANULOCYTES NFR BLD AUTO: 0.4 % — SIGNIFICANT CHANGE UP (ref 0–0.9)
LYMPHOCYTES # BLD AUTO: 1.17 K/UL — SIGNIFICANT CHANGE UP (ref 1–3.3)
LYMPHOCYTES # BLD AUTO: 26.1 % — SIGNIFICANT CHANGE UP (ref 13–44)
MCHC RBC-ENTMCNC: 25.8 PG — LOW (ref 27–34)
MCHC RBC-ENTMCNC: 32.6 G/DL — SIGNIFICANT CHANGE UP (ref 32–36)
MCV RBC AUTO: 79.1 FL — LOW (ref 80–100)
MONOCYTES # BLD AUTO: 0.47 K/UL — SIGNIFICANT CHANGE UP (ref 0–0.9)
MONOCYTES NFR BLD AUTO: 10.5 % — SIGNIFICANT CHANGE UP (ref 2–14)
NEUTROPHILS # BLD AUTO: 2.7 K/UL — SIGNIFICANT CHANGE UP (ref 1.8–7.4)
NEUTROPHILS NFR BLD AUTO: 60.2 % — SIGNIFICANT CHANGE UP (ref 43–77)
NRBC # BLD: 0 /100 WBCS — SIGNIFICANT CHANGE UP (ref 0–0)
PLATELET # BLD AUTO: 109 K/UL — LOW (ref 150–400)
POTASSIUM SERPL-MCNC: 3.7 MMOL/L — SIGNIFICANT CHANGE UP (ref 3.5–5.3)
POTASSIUM SERPL-SCNC: 3.7 MMOL/L — SIGNIFICANT CHANGE UP (ref 3.5–5.3)
PROT SERPL-MCNC: 6.7 G/DL — SIGNIFICANT CHANGE UP (ref 6–8.3)
RBC # BLD: 4.07 M/UL — SIGNIFICANT CHANGE UP (ref 3.8–5.2)
RBC # FLD: 15.5 % — HIGH (ref 10.3–14.5)
SODIUM SERPL-SCNC: 138 MMOL/L — SIGNIFICANT CHANGE UP (ref 135–145)
WBC # BLD: 4.49 K/UL — SIGNIFICANT CHANGE UP (ref 3.8–10.5)
WBC # FLD AUTO: 4.49 K/UL — SIGNIFICANT CHANGE UP (ref 3.8–10.5)

## 2023-03-13 PROCEDURE — 99214 OFFICE O/P EST MOD 30 MIN: CPT

## 2023-03-14 ENCOUNTER — NON-APPOINTMENT (OUTPATIENT)
Age: 58
End: 2023-03-14

## 2023-03-14 DIAGNOSIS — R11.2 NAUSEA WITH VOMITING, UNSPECIFIED: ICD-10-CM

## 2023-03-15 ENCOUNTER — APPOINTMENT (OUTPATIENT)
Dept: INFUSION THERAPY | Facility: HOSPITAL | Age: 58
End: 2023-03-15

## 2023-03-15 RX ORDER — METOCLOPRAMIDE 10 MG/1
10 TABLET ORAL
Qty: 45 | Refills: 3 | Status: ACTIVE | COMMUNITY
Start: 2022-08-03 | End: 1900-01-01

## 2023-03-16 NOTE — DATA REVIEWED
[FreeTextEntry1] : CT C/A/P  (03/07/2023): \par \par COMPARISON: CT chest abdomen pelvis 12/23/2022 and abdomen pelvis 1/27/2023.\par \par FINDINGS:\par CHEST:\par LUNGS AND LARGE AIRWAYS: Patent central airways. Area of focal airspace opacity in the left upper lobe measuring 1.1 cm, image 9-32, has decreased in size when compared to prior study 12/23/2022, from previously 1.9 cm.\par PLEURA: No pleural effusion. Mild thickening of the pleura in the left major fissure, unchanged from prior study.\par VESSELS: Ascending thoracic aorta measures 3.2 cm, the pulmonary trunk 0.6 cm. No central pulmonary arterial filling defects.\par HEART: Heart size is normal. No pericardial effusion.\par MEDIASTINUM AND MAXIM: Right upper pretracheal enlarged enhancing lymph node measures 1.2 x 1.4 x 1.3 cm , image 3-17, increased from previously 0.6 x 1.1 x 0.7 cm. Right lower pretracheal lymph node measuring 0.8 x 0.9 cm, image 3-23 at the level of the azygos vein is minimally increased from previously 0.7 x 0.7 cm. There is left hilar soft tissue density measuring 0.6 x 1.5 cm which is decreased from previously 0.7 x 1.7 cm, image 3-29. There is also nonmeasurable density at the origin of the left upper lobe bronchus which is unchanged, image 3-27.\par CHEST WALL AND LOWER NECK: Right prepectoral Mediport with the catheter tip in the atrium.\par \par ABDOMEN AND PELVIS:\par LIVER: No newly developed focal liver lesions.\par BILE DUCTS: Normal caliber. Pneumobilia. Hepaticojejunostomy appears patent.\par GALLBLADDER: Cholecystectomy.\par SPLEEN: Normal-sized.\par PANCREAS: Status post Whipple procedure. Pancreaticojejunostomy and hepaticojejunostomy are patent. The pancreatic duct of the pancreatic remnant is not dilated. No discrete masses. Postsurgical changes along the course of the proximal SMV with some haziness in the mesenteric fat planes, image 3-89, is unchanged compared to prior study 12/23/2022.\par ADRENALS: Within normal limits.\par KIDNEYS/URETERS: Mild left pelvocaliectasis and dilated proximal left ureter, similar to prior study 1/27/2023. The mid and distal left ureter has normal caliber. Fullness of the right renal pelvis is also stable since prior study. No hydroureter. No nephroureterolithiasis.\par \par BLADDER: Limited evaluation due to overlying artifacts.\par REPRODUCTIVE ORGANS: Uterus and adnexa are within normal limits.\par \par BOWEL: Status post Whipple procedure. Gastrojejunostomy. The stomach remnant is not dilated. The small bowel has normal caliber. No bowel obstruction. Appendix is normal.\par PERITONEUM: No ascites.\par VESSELS: Normal caliber of the abdominal aorta. Mild atherosclerotic changes. Hepatic veins, portal vein, SMV, renal veins and IVC are patent.\par RETROPERITONEUM/LYMPH NODES: Subcentimeter mesenteric lymph nodes are unchanged compared to prior study. Subcentimeter retroperitoneal nodes are also stable. No pelvic adenopathy.\par ABDOMINAL WALL: Postoperative changes along the anterior abdominal wall midline with some peritoneal thickening. Within the soft tissues of the abdominal wall musculature slightly to the left of midline there is an oval / rounded structure with peripheral enhancement, measuring 1.1 x 1.7 x 1.5 cm which is new compared to prior study 12/23/2022 and unrelated to sequela from prior abdominal wall hematoma. The lesion is concerning for a focal enhancing nodule and metastasis.\par BONES: Left total hip arthroplasty. No aggressive lytic or blastic bony lesions.\par \par IMPRESSION:\par Status post Whipple procedure for ampullary cancer with expected postoperative changes. No evidence of local recurrence.\par \par Enlarged abnormally enhancing adenopathy in the mediastinum concerning for new metastatic involvement.\par \par Improvement of the left upper lobe focal airspace opacity with decreasing soft tissue in the left hilum, previously identified as neoplastic disease.\par \par Nodule in the abdominal wall scar tissue with peripheral enhancement as described up to 1.7cm. A soft tissue implant and metastasis is of concern since the lesion is new since prior study dated 12/23/2022; a inflammatory lesion is a differential diagnosis but felt less likely.

## 2023-03-16 NOTE — PHYSICAL EXAM
[General Appearance - Alert] : alert [Affect] : the affect was normal [FreeTextEntry1] : thin, weak-appearing, sitting in wheelchair [Sclera] : the sclera and conjunctiva were normal [Normal Oral Mucosa] : normal oral mucosa [Neck Appearance] : the appearance of the neck was normal [] : no respiratory distress [Auscultation Breath Sounds / Voice Sounds] : lungs were clear to auscultation bilaterally [Heart Rate And Rhythm] : heart rate was normal and rhythm regular [Heart Sounds] : normal S1 and S2 [Edema] : there was no peripheral edema [Bowel Sounds] : normal bowel sounds [Abdomen Soft] : soft [Abdomen Tenderness] : non-tender [Skin Color & Pigmentation] : normal skin color and pigmentation

## 2023-03-16 NOTE — ASSESSMENT
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 To see Rad Onc next week. \par \par # Pain 2/2 Neoplasm - c/w Methadone 5mg BID, PRN oxycodone IR 5-10mg.\par \par # Low appetite - Pt to obtain medicinal cannabis today. Pt should keep to small, frequent meals. \par \par # Generalized weakness - Patient continues to require significant assistance with her ADLs. \par \par # Encounter for palliative care- Emotional support provided \par Patient's daughter provided copy of HCP form for our records; scanned into EMR. \par \par Follow up in 2 weeks, call sooner with questions or issues.

## 2023-03-16 NOTE — HISTORY OF PRESENT ILLNESS
[FreeTextEntry1] : 58yoF with ampullary pancreaticobiliary carcinoma presents for follow up visit.\par PMH significant for DM on Metformin, HTN. \par \par Patient preferred language is Lao. She requests that her daughter Olegario speak on her behalf. \par \par Patient originally diagnosed with ampullary carcinoma 12/2018 after presenting with dizziness, found to have elevated LFTs. MRCP demonstrated a dilatation of the CBD. ERCP on 12/22/18 performed c/w ampullary mass with obstruction. Pathology c/w adenocarcinoma. On 1/11/19 pt underwent ex-lap, whipple pancreaticoduodenectomy for duodenal adenocarcinoma, post-op course c/b hemorrhagic shock, drop in H/H to 6/19 lactate 9. Taken to the OR for evacuation of hematoma, cessation of gastroduodenal artery bleeding with two hemostatic stitches, and placement of abthera VAC. Course further complicated by infection and prolonged SICU stay requiring intubation. \par \par 4/30-10/21/19: Received 8 cycles Gemzar/Xeloda \par 1/20-11/2020: prolonged stay in [facility] due to COVID \par 1/21/22: ventral hernia repair \par 3/10/22: CT abd/pel: significant interval improvement of previously described rectus/anterior abdominal wall hematoma, previously described focal hematoma at the left anterior abdominal wall is decreased in size\par 7/1/22: PET/CT: Masslike consolidation in the anterior left upper lung with intense uptake and left paratracheal lymph node, small focus of uptake in the right hepatic lobe near the severino hepatis suspicious for metastatic lesion.\par 7/19-7/22/22: [facility] admission s/p bronchoscopy 7/20/22; 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  Has been very fatigued and weak. \par \par 9/7/22 Interval History: Patient's tx was held due to low ANC count. She continued to report persistent cough (uses Robitussin and Promethazine/Codeine cough syrup 10 ml q 4-6 hrs, it provides mild/temporary relief). Her fatigue, peripheral neuropathy of b/l hands + feet, chest and back pain remain unchanged (uses Oxycodone PRN). Patient's daughter is making a conscious effort to reinforce her oral intake (stable weight noted today). Her bowel movements remain regular (normal in color, consistency, frequency). \par \par \par Main reason for which patient is referred is pain and symptom management. \par She has been experiencing pain and burning in the upper abdomen, always present. No relation to eating. \par She uses Oxycodone IR 5mg on a PRN basis, is using it 1-2 times daily. Daughter notes that the pain spikes in the 2-4 days after chemotherapy. Pt rates her pain at 8/10 at its worst. Presently rates it at 0. \par She was hospitalized at [facility]; underwent surgery- L hemiarthroplasty on 1/23. Completed RT on 1/24 x 5 sessions. \par \par Interval Hx (3/13/23):\par Pain has been worsening over the last weeks. Methadone dose was increased by Oncology back up to 5mg BID which patient was able to  today. Daughter has been administering Oxycodone 10mg - 20mg on a PRN basis, over the weekend she received 20mg dose due to high pain levels, this has helped. \par Reports L knee pain and L ankle pain that started earlier today. \par She remains quite weak, is dependent on help from her family to get up and out of bed. \par She uses a bedpan or bedside commode. \par Daughter states she has a medical cannabis card for the patient but has not purchased any as of yet. They would like to use it to help with appetite. \par Pt started treatment with 5-FU today after having been found to have POD on recent scan.\par \par ROS:\par +weakness - requires assistance with ADLs\par +weight loss - down to about 95lbs most recently, per daughter. \par +chest/back pain - she uses methadone and oxycodone IR 5mg\par +low appetite/ early satiety - daughter states that pt mainly eats rice, soup.  Pt desires very little other food.  She drinks ~1 Glucerna daily. \par +chronic cough - Promethazine/Codeine syrup\par +nausea, no vomiting - uses metoclopramide PRN \par +trouble sleeping at night \par +constipation - takes miralax as needed, senna and dulcolax daily.  Last BM was yesterday. Averages a BM every 1-2 days. \par +cough - uses promethazine-codeine syrup PRN, Guaifenesin PRN - these help\par +generalized itching \par Denies mood changes, anxiety.  \par \par Ambulates with the assistance of a cane. She has a shower chair. No recent falls reported. \par \par Patient is , lives with her  and two children (ages 32, 20) in an apartment in the [place]. She ambulates with a cane when needed. Her daughter is her HHA through the [facility] providing 56 hours of care a week. Her daughter assists with her ADL's. Pt spends her day reading and watching TV. She feels supported by family. \par \par PCP: Dr. Marisol Tripp ([place]) \par \par I-STOP Ref#:  [id number]

## 2023-03-17 LAB
FULL GENE SEQUENCE RESULT: SIGNIFICANT CHANGE UP
METHOD:: SIGNIFICANT CHANGE UP
MOL DX INTERP BLD/T QL: SIGNIFICANT CHANGE UP
REVIEWED BY: SIGNIFICANT CHANGE UP
TA REPEAT RESULT: SIGNIFICANT CHANGE UP
TEST PERFORMANCE INFO SPEC: SIGNIFICANT CHANGE UP
UGT1A1 GENE MUT ANL BLD/T: SIGNIFICANT CHANGE UP

## 2023-03-20 ENCOUNTER — OUTPATIENT (OUTPATIENT)
Dept: OUTPATIENT SERVICES | Facility: HOSPITAL | Age: 58
LOS: 1 days | Discharge: HOME | End: 2023-03-20
Payer: MEDICAID

## 2023-03-20 ENCOUNTER — APPOINTMENT (OUTPATIENT)
Dept: RADIATION ONCOLOGY | Facility: CLINIC | Age: 58
End: 2023-03-20
Payer: MEDICAID

## 2023-03-20 VITALS
BODY MASS INDEX: 18.06 KG/M2 | OXYGEN SATURATION: 100 % | SYSTOLIC BLOOD PRESSURE: 120 MMHG | RESPIRATION RATE: 17 BRPM | WEIGHT: 92 LBS | DIASTOLIC BLOOD PRESSURE: 69 MMHG | HEIGHT: 60 IN | HEART RATE: 75 BPM | TEMPERATURE: 97.7 F

## 2023-03-20 DIAGNOSIS — Z98.890 OTHER SPECIFIED POSTPROCEDURAL STATES: Chronic | ICD-10-CM

## 2023-03-20 DIAGNOSIS — K92.2 GASTROINTESTINAL HEMORRHAGE, UNSPECIFIED: Chronic | ICD-10-CM

## 2023-03-20 DIAGNOSIS — Z90.410 ACQUIRED TOTAL ABSENCE OF PANCREAS: Chronic | ICD-10-CM

## 2023-03-20 DIAGNOSIS — Z51.11 ENCOUNTER FOR ANTINEOPLASTIC CHEMOTHERAPY: Chronic | ICD-10-CM

## 2023-03-20 PROCEDURE — 77262 THER RADIOLOGY TX PLNG INTRM: CPT

## 2023-03-20 PROCEDURE — 99214 OFFICE O/P EST MOD 30 MIN: CPT | Mod: 25

## 2023-03-20 NOTE — VITALS
[Maximal Pain Intensity: 5/10] : 5/10 [Least Pain Intensity: 4/10] : 4/10 [Pain Description/Quality: ___] : Pain description/quality: [unfilled] [Pain Duration: ___] : Pain duration: [unfilled] [Pain Location: ___] : Pain Location: [unfilled] [Pain Interferes with ADLs] : Pain interferes with activities of daily living. [40: Disabled, requires special care and assistance.] : 40: Disabled, requires special care and assistance. [ECOG Performance Status: 3 - Capable of only limited self care, confined to bed or chair more than 50% of waking hours] : Performance Status: 3 - Capable of only limited self care, confined to bed or chair more than 50% of waking hours

## 2023-03-21 LAB
MISCELLANEOUS TEST: NORMAL
PROC NAME: NORMAL

## 2023-03-22 ENCOUNTER — APPOINTMENT (OUTPATIENT)
Dept: ORTHOPEDIC SURGERY | Facility: CLINIC | Age: 58
End: 2023-03-22
Payer: SELF-PAY

## 2023-03-22 DIAGNOSIS — Z96.642 PRESENCE OF LEFT ARTIFICIAL HIP JOINT: ICD-10-CM

## 2023-03-22 PROCEDURE — 99024 POSTOP FOLLOW-UP VISIT: CPT

## 2023-03-23 ENCOUNTER — APPOINTMENT (OUTPATIENT)
Dept: RADIOLOGY | Facility: IMAGING CENTER | Age: 58
End: 2023-03-23
Payer: MEDICAID

## 2023-03-23 ENCOUNTER — OUTPATIENT (OUTPATIENT)
Dept: OUTPATIENT SERVICES | Facility: HOSPITAL | Age: 58
LOS: 1 days | End: 2023-03-23
Payer: MEDICAID

## 2023-03-23 DIAGNOSIS — C79.51 SECONDARY MALIGNANT NEOPLASM OF BONE: ICD-10-CM

## 2023-03-23 DIAGNOSIS — C24.1 MALIGNANT NEOPLASM OF AMPULLA OF VATER: ICD-10-CM

## 2023-03-23 DIAGNOSIS — Z98.890 OTHER SPECIFIED POSTPROCEDURAL STATES: Chronic | ICD-10-CM

## 2023-03-23 DIAGNOSIS — K92.2 GASTROINTESTINAL HEMORRHAGE, UNSPECIFIED: Chronic | ICD-10-CM

## 2023-03-23 DIAGNOSIS — Z90.410 ACQUIRED TOTAL ABSENCE OF PANCREAS: Chronic | ICD-10-CM

## 2023-03-23 DIAGNOSIS — Z51.11 ENCOUNTER FOR ANTINEOPLASTIC CHEMOTHERAPY: Chronic | ICD-10-CM

## 2023-03-23 PROBLEM — Z96.642 HISTORY OF HEMIARTHROPLASTY OF LEFT HIP: Status: ACTIVE | Noted: 2023-02-16

## 2023-03-23 PROCEDURE — 73590 X-RAY EXAM OF LOWER LEG: CPT

## 2023-03-23 PROCEDURE — 73590 X-RAY EXAM OF LOWER LEG: CPT | Mod: 26,LT

## 2023-03-23 PROCEDURE — 77334 RADIATION TREATMENT AID(S): CPT | Mod: 26

## 2023-03-23 PROCEDURE — 77290 THER RAD SIMULAJ FIELD CPLX: CPT | Mod: 26

## 2023-03-23 NOTE — HISTORY OF PRESENT ILLNESS
[___ Months Post Op] : [unfilled] months post op [Doing Well] : is doing well [No Sign of Infection] : is showing no signs of infection [Adequate Pain Control] : has adequate pain control [de-identified] : 1/26/2023 -left hip hemiarthroplasty for pathologic fracture in the setting of ampullary carcinoma [de-identified] : Patient still encountered in a wheelchair and she is throwing up.  Overall her body is very weak but she thinks is is most likely from her chemotherapy.  She is able to walk.  She is able to get up and move around otherwise.  She has some pain in her thigh and going down to her foot. [de-identified] : On exam incisions clean dry and intact.  She is healed the rest.  She has no calf tenderness.  She is able to actively lift her leg approximately 30 degrees and she is able to stand on it with some pain.  She is doing some physical therapy. [de-identified] : X-rays today multiple views of the left hip show the cemented hemiarthroplasty in position.  More proximally whether cement was made up for the medial calcar there are no changes.\par X-rays today AP pelvis and views of the left hip show the cemented hemiarthroplasty in good position.  There is obvious tumor at the calcar where there is cement supporting this. [de-identified] : 2 months status post left hip and knee arthroplasty for metastatic disease.  Her skin is healing however overall she is still having problems because of her disease and because of chemotherapy.  My recommendation is to continue physical therapy for ambulation.  She has no other sites of pain to worry about surgically right now. [de-identified] : Follow-up again in3 months.\par \par If imaging was ordered, the patient was told to make an appointment to review findings right after all imaging is completed.\par \par We discussed risks, benefits and alternatives. Rationale of care was reviewed and all questions were answered. Patient (and family) had all questions answered to her degree of the level of satisfaction. Patient (and family) expressed understanding and interest in proceeding with the plan as outlined.\par \par \par \par \par This note was done with a voice recognition transcription software and any typos are related to this rather than medical error. Surgical risks reviewed. Patient (and family) had all questions answered to an agreeable level of satisfaction. Patient (and family) expressed understanding and interest in proceeding with the plan as outlined.  \par

## 2023-03-27 ENCOUNTER — RESULT REVIEW (OUTPATIENT)
Age: 58
End: 2023-03-27

## 2023-03-27 ENCOUNTER — APPOINTMENT (OUTPATIENT)
Dept: HEMATOLOGY ONCOLOGY | Facility: CLINIC | Age: 58
End: 2023-03-27
Payer: MEDICAID

## 2023-03-27 ENCOUNTER — APPOINTMENT (OUTPATIENT)
Dept: INFUSION THERAPY | Facility: HOSPITAL | Age: 58
End: 2023-03-27

## 2023-03-27 LAB
BASOPHILS # BLD AUTO: 0.02 K/UL — SIGNIFICANT CHANGE UP (ref 0–0.2)
BASOPHILS NFR BLD AUTO: 0.4 % — SIGNIFICANT CHANGE UP (ref 0–2)
EOSINOPHIL # BLD AUTO: 0.11 K/UL — SIGNIFICANT CHANGE UP (ref 0–0.5)
EOSINOPHIL NFR BLD AUTO: 2.2 % — SIGNIFICANT CHANGE UP (ref 0–6)
HCT VFR BLD CALC: 31.1 % — LOW (ref 34.5–45)
HGB BLD-MCNC: 10.3 G/DL — LOW (ref 11.5–15.5)
IMM GRANULOCYTES NFR BLD AUTO: 0.4 % — SIGNIFICANT CHANGE UP (ref 0–0.9)
LYMPHOCYTES # BLD AUTO: 0.85 K/UL — LOW (ref 1–3.3)
LYMPHOCYTES # BLD AUTO: 17 % — SIGNIFICANT CHANGE UP (ref 13–44)
MCHC RBC-ENTMCNC: 26.1 PG — LOW (ref 27–34)
MCHC RBC-ENTMCNC: 33.1 G/DL — SIGNIFICANT CHANGE UP (ref 32–36)
MCV RBC AUTO: 78.9 FL — LOW (ref 80–100)
MONOCYTES # BLD AUTO: 0.64 K/UL — SIGNIFICANT CHANGE UP (ref 0–0.9)
MONOCYTES NFR BLD AUTO: 12.8 % — SIGNIFICANT CHANGE UP (ref 2–14)
NEUTROPHILS # BLD AUTO: 3.35 K/UL — SIGNIFICANT CHANGE UP (ref 1.8–7.4)
NEUTROPHILS NFR BLD AUTO: 67.2 % — SIGNIFICANT CHANGE UP (ref 43–77)
NRBC # BLD: 0 /100 WBCS — SIGNIFICANT CHANGE UP (ref 0–0)
PLATELET # BLD AUTO: 109 K/UL — LOW (ref 150–400)
RBC # BLD: 3.94 M/UL — SIGNIFICANT CHANGE UP (ref 3.8–5.2)
RBC # FLD: 15.5 % — HIGH (ref 10.3–14.5)
WBC # BLD: 4.99 K/UL — SIGNIFICANT CHANGE UP (ref 3.8–10.5)
WBC # FLD AUTO: 4.99 K/UL — SIGNIFICANT CHANGE UP (ref 3.8–10.5)

## 2023-03-27 PROCEDURE — 99214 OFFICE O/P EST MOD 30 MIN: CPT

## 2023-03-27 PROCEDURE — 77307 TELETHX ISODOSE PLAN CPLX: CPT | Mod: 26

## 2023-03-27 PROCEDURE — 77334 RADIATION TREATMENT AID(S): CPT | Mod: 26

## 2023-03-28 DIAGNOSIS — E86.0 DEHYDRATION: ICD-10-CM

## 2023-03-28 DIAGNOSIS — C79.51 SECONDARY MALIGNANT NEOPLASM OF BONE: ICD-10-CM

## 2023-03-28 LAB
ALBUMIN SERPL ELPH-MCNC: 3.7 G/DL — SIGNIFICANT CHANGE UP (ref 3.3–5)
ALP SERPL-CCNC: 300 U/L — HIGH (ref 40–120)
ALT FLD-CCNC: 18 U/L — SIGNIFICANT CHANGE UP (ref 10–45)
ANION GAP SERPL CALC-SCNC: 12 MMOL/L — SIGNIFICANT CHANGE UP (ref 5–17)
AST SERPL-CCNC: 28 U/L — SIGNIFICANT CHANGE UP (ref 10–40)
BILIRUB SERPL-MCNC: 0.7 MG/DL — SIGNIFICANT CHANGE UP (ref 0.2–1.2)
BUN SERPL-MCNC: 6 MG/DL — LOW (ref 7–23)
CALCIUM SERPL-MCNC: 9.4 MG/DL — SIGNIFICANT CHANGE UP (ref 8.4–10.5)
CANCER AG19-9 SERPL-ACNC: 5467 U/ML — HIGH
CHLORIDE SERPL-SCNC: 101 MMOL/L — SIGNIFICANT CHANGE UP (ref 96–108)
CO2 SERPL-SCNC: 24 MMOL/L — SIGNIFICANT CHANGE UP (ref 22–31)
CREAT SERPL-MCNC: 0.42 MG/DL — LOW (ref 0.5–1.3)
EGFR: 114 ML/MIN/1.73M2 — SIGNIFICANT CHANGE UP
GLUCOSE SERPL-MCNC: 120 MG/DL — HIGH (ref 70–99)
POTASSIUM SERPL-MCNC: 3.9 MMOL/L — SIGNIFICANT CHANGE UP (ref 3.5–5.3)
POTASSIUM SERPL-SCNC: 3.9 MMOL/L — SIGNIFICANT CHANGE UP (ref 3.5–5.3)
PROT SERPL-MCNC: 6.7 G/DL — SIGNIFICANT CHANGE UP (ref 6–8.3)
SODIUM SERPL-SCNC: 137 MMOL/L — SIGNIFICANT CHANGE UP (ref 135–145)

## 2023-03-30 ENCOUNTER — APPOINTMENT (OUTPATIENT)
Dept: GERIATRICS | Facility: CLINIC | Age: 58
End: 2023-03-30
Payer: MEDICAID

## 2023-03-30 DIAGNOSIS — R63.0 ANOREXIA: ICD-10-CM

## 2023-03-30 DIAGNOSIS — R53.1 WEAKNESS: ICD-10-CM

## 2023-03-30 PROCEDURE — 99214 OFFICE O/P EST MOD 30 MIN: CPT | Mod: 95

## 2023-03-30 NOTE — HISTORY OF PRESENT ILLNESS
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 \par \par Initially was admitted to [facility] on 12/18/18 with dizziness, found to have elevated LFTs. MRCP demonstrated a dilatation of the CBD. ERCP on 12/22/18 performed c/w ampullary mass with obstruction. Pathology c/w adenocarcinoma. Pt was discharged for out pt follow up with surg onc and med onc but was re-admitted on 1/5/19 with n/v, worsening abdominal pain, found to have severe sepsis with E coli bacteremia 2/2 acute cholangitis. \par 1/7: ERCP performed. Stent was removed from the biliary tree. One plastic stent placed\par 1/5 CT A/P Heterogeneity of the right hepatic lobe with questionable 1.3 cm hypoattenuating lesion in segment 7.\par 1/8 MRI Abd: No suspicious liver lesions. No abnormality is identified to correspond with questionable lesion identified on prior CT. 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  Taken to the OR for evacuation of hematoma, cessation of gastroduodenal artery bleeding with two hemostatic stitches, and placement of abthera VAC.  Required pressors in the SICU, then developed fever. \par 1/21-1/22: OR exploratory laparotomy, drainage of abdominal abscess, repair of abdominal wall hernia with Strattice mesh, b/ abdominal wall advancement flaps. Post-op patient remained intubated and off vasopressors. FENA calculated to be pre-renal. 500CC albumin bolus given, urine output improved. 1/22/19 attempted spontaneous breathing trials but pt hypoxic. Pt lactate uptrending, resuscitated w/IVF, and pt hypotensive requiring pressors. UCx growing candida, Diflucan changed to micafungin to r/o resistant organisms in the setting of worsening sepsis. \par 1/23/19, patient noted to have blood oozing from midline incision while hypotensive and on pressors. Peak pressures noted to be elevated on vent and abdomen increasingly distended. All staples removed and a large amount of clot evacuated from midline incision. 1U PRBC given. No active bleeding appreciated. Fascia remains closed. Wound packed with wet to dry dressing. Vitamin K given for elevated INR. \par 1/24-1/29: remained in SICU, started on TPN, diuresed, finally extubated. \par 2/1: septic shock again, pressors restarted\par 2/2-2/11: CT scan with peritonitis, new abscesses. Ab and Antifungals started. Wound with increase in drainage. Octreotide started for increased output \par 2/12: drainage of abscesses by IR.  \par 2/18: Wound was partially closed by plastics with interrupted sutures and an ostomy appliance was placed, VAC removed.\par 3/1/19. 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 s/p embolization\par 3/14/22: CT A/P: nodular hepatic contour, stomach is distended, improvement in hematoma \par 7/1/22: PET/CT: Masslike consolidation in the anterior left upper lung with intense uptake and left paratracheal lymph node, small focus of uptake in the right hepatic lobe near the severino hepatis suspicious for metastatic lesion.\par 7/19-7/22/22: [facility] admission s/p bronchoscopy 7/20/22; 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) \par 8/22-12/27/22: Gemzar/Abraxane Q 2 weeks\par 9/25-10/12/22: RT to YUNI mass \par 10/22-11/1/22: admitted to [facility] with fever\par 12/12/22: MR L/T spine: concern for L4 met\par 12/23/22: Bone scan: abnormal foci in L femoral neck and L spine suspicious for osseous mets, risk for fx in femoral neck \par 12/23/22: CT CAP: previously identified lung mass is not identified, no evidence of mets \par 12/30-2/2/23: admitted to [facility] due to inability to ambulate. Completed RT on 1/24 x 5 sessions. L hemiarthroplasty on 1/23. Recommendation for [facility] but pt went home due to preference. \par 3/7/23: CT CAP: s/p Whipple, no evidence of local recurrence, adenopathy in mediastinum, improvement in YUNI airspace opacity, soft tissue nodule in abdominal scar concerning for metastasis. \par 3/14/23: C1 5FU/LV \par \par  [de-identified] : adenocarcinoma [de-identified] : CA [id number] [de-identified] : FINAL PATHOLOGY:\par -Invasive adenocarcinoma of the duodenal ampulla (Tumor size: 2.5 x 1.5 x 0.8 cm); Tumor stage: pT3b pN1\par -Vascular and perineural invasion identified\par -1/24 lymph nodes involved by adenocarcinoma\par -Negative margins\par -S/p cholecystectomy ; acute cholecystitis with cholelithiasis \par -Bile duct margin with acute inflammation and focal atypia consistent with low grade dysplasia. \par  [de-identified] : very fatigued after last dose. Worse than before. Had n/v almost daily. Antiemetics mildly effective. Daughter reports pt needs help with all ADLs. c/o L ankle pain, over tibia. No LE edema, no calf tenderness. Had xray of foot/leg which were neg.

## 2023-03-30 NOTE — REVIEW OF SYSTEMS
[Fatigue] : fatigue [Recent Change In Weight] : ~T recent weight change [Vomiting] : vomiting [Joint Pain] : joint pain [Joint Stiffness] : joint stiffness [Dizziness] : dizziness [Difficulty Walking] : difficulty walking [Negative] : Allergic/Immunologic

## 2023-03-31 PROCEDURE — 77280 THER RAD SIMULAJ FIELD SMPL: CPT | Mod: 26

## 2023-03-31 NOTE — REVIEW OF SYSTEMS
[Constipation: Grade 0] : Constipation: Grade 0 [Diarrhea: Grade 0] : Diarrhea: Grade 0 [Nausea: Grade 2 - Oral intake decreased without significant weight loss, dehydration or malnutrition] : Nausea: Grade 2 - Oral intake decreased without significant weight loss, dehydration or malnutrition [Vomiting: Grade 1 - 1 - 2 episodes ( by 5 minutes) in 24 hrs] : Vomiting: Grade 1 - 1 - 2 episodes ( by 5 minutes) in 24 hrs [Fatigue: Grade 3 - Fatigue not relieved by rest, limiting self care ADL] : Fatigue: Grade 3 - Fatigue not relieved by rest, limiting self care ADL [Dizziness: Grade 2 - Moderate unsteadiness or sensation of movement; limiting instrumental ADL] : Dizziness: Grade 2 - Moderate unsteadiness or sensation of movement; limiting instrumental ADL [Headache: Grade 1 - Mild pain] : Headache: Grade 1 - Mild pain [Lethargy: Grade 2 - Moderate symptoms; limiting instrumental ADL] : Lethargy: Grade 2 - Moderate symptoms; limiting instrumental ADL [Somnolence: Grade 2 - Moderate sedation; limiting instrumental ADL] : Somnolence: Grade 2 - Moderate sedation; limiting instrumental ADL [Cough: Grade 1 - Mild symptoms; nonprescription intervention indicated] : Cough: Grade 1 - Mild symptoms; nonprescription intervention indicated [Dyspnea: Grade 1 - Shortness of breath with moderate exertion] : Dyspnea: Grade 1 - Shortness of breath with moderate exertion [Hematuria: Grade 0] : Hematuria: Grade 0 [Urinary Incontinence: Grade 0] : Urinary Incontinence: Grade 0  [Urinary Retention: Grade 0] : Urinary Retention: Grade 0 [Urinary Tract Pain: Grade 0] : Urinary Tract Pain: Grade 0 [Urinary Urgency: Grade 0] : Urinary Urgency: Grade 0 [Urinary Frequency: Grade 0] : Urinary Frequency: Grade 0 [Vaginal Infection: Grade 0] : Vaginal Infection: Grade 0  [de-identified] : 3/18/23 evening, PRN med taken [de-identified] : 3/18/23 after taking metoclopramide [FreeTextEntry3] : Significant fatigue, weakness increase x1 week  [FreeTextEntry6] : while vomiting, left side, resolves after vomiting episode.

## 2023-03-31 NOTE — PHYSICAL EXAM
[Normal] : well developed, well nourished, in no acute distress [Thin] : thin [Sclera] : the sclera and conjunctiva were normal [No Focal Deficits] : no focal deficits [de-identified] : in wheelchair

## 2023-03-31 NOTE — HISTORY OF PRESENT ILLNESS
[FreeTextEntry1] : 57 yo F with diagnosed stage III, pT3N1b ampullary CA, s/p Whipple pancreaticoduodenectomy and 8 cycles Gemzar/Xeloda, recently found to have new mets to YUNI and left paratracheal lymph node, First presented to clinic for consultation 12/8/2022 for discussion of RT for oligometastases to lung and for pain control.  With new found oligometastases to lung/LN, we discussed adding local radiation therapy for local control and possibly to improve disease free survival. Given the location and LN involvement, I did not feel SBRT would be appropriate. Hence I recommended a moderately hypofractionated course of radiation to the left lung/LN/hilum totaling 45Gy/15fx which she completed 10/14/2022\par \par She was hospitalized at [facility] from 10/21/2022 -11/1/2022 for stomach pain and weakness. CT consistent with gastritis and colitis, she was followed by GI, Not deemed a good candidate at the time for EGD, received with Antibiotics for PNA  and Antifungal medications for candida esophagitis. \par \par 12/8/2022:  Patient is seen for follow up and her CA 19-9 is trending up from 1362 to 4580 within a couple of weeks.  \par Her inpatient CT chest from 10/25/2022 showed Interval decrease in size of left upper lobe/paramediastinal mass and adjacent metastatic disease.  Interval development of new ground glass opacities in the left upper lobe, suggestive of infection in the setting of recent chemotherapy.  \par She continues on [place]/Abraxane and recently had US LE on 11/29/2022 due to leg pain which was negative for DVT. \par Today she has ongoing severe left leg pain, unable to walk and bear weight on the extremity. Denies warmth, swelling and redness. Taking Oxycodone 5 mg TID and Gabapentin 300 mg BID. Pain is unrelieved. She has stable mild cough, denies SOB, chest pain.\par \par Patient had CT-C/A/P on 3/7/2023  IMPRESSION:\par Status post Whipple procedure for ampullary cancer with expected postoperative changes. No evidence of local recurrence.\par Enlarged abnormally enhancing adenopathy in the mediastinum concerning for new metastatic involvement.\par Improvement of the left upper lobe focal airspace opacity with decreasing soft tissue in the left hilum, previously identified as neoplastic disease.\par Nodule in the abdominal wall scar tissue with peripheral enhancement as described up to 1.7cm. A soft tissue implant and metastasis is of concern since the lesion is new since prior study dated 12/23/2022; a inflammatory lesion is a differential diagnosis but felt less likely.\par \par MRI obtained on 3/9/2-23: IMPRESSION:\par 1.  Increased metastatic involvement of the left L5 posterior elements now with involvement of the posterior vertebral body and mild involvement of the adjacent left L4 intra-articular percent.\par 2.  New metastatic involvement of the right L1 posterior elements, posterior vertebral body, and spinous process.\par 3.  Multilevel degenerative disc disease is present without significant central stenosis although there is mild to moderate narrowing of the lateral recesses at L4-L5.\par 4.  Osseous expansion of the left L5 pedicle contributes to moderate left L5-S1 neural foraminal stenosis.\par 5.  There is mild additional scattered neural foraminal stenosis.\par \par 3/20/2023: Patient presents to clinic to discuss palliative RT to L spine. Increasing pain left hip as well as left calf. Patient had hospitalization at [facility] 2/29/2022-2/3/2023 admitted for leg pain,  patient received 20Gy in 5 fx to left femur at [facility] while inpatient from 1/13/23-1/24/2023. Left hip replacement 1/26//2023.  New chemo protocol 3/13/2023, N/V post chemo. PRN meds currently not effective. Left sided headache during vomiting, resolves after. Reports SOB/CERDA at patient baseline. Sharp pain with coughing to lumbar spine.  Increased fatigue/lethargy started prior to new chemo regimen. Increased dizziness, unsteady gait.

## 2023-04-03 PROBLEM — R63.0 POOR APPETITE: Status: ACTIVE | Noted: 2019-04-25

## 2023-04-03 PROBLEM — R53.1 WEAKNESS GENERALIZED: Status: ACTIVE | Noted: 2022-09-28

## 2023-04-03 NOTE — ASSESSMENT
[______] : HCP: [unfilled] [FreeTextEntry1] : 58yoF with:\par \par # Ampullary pancreaticobiliary carcinoma - On 5-FU; Planned for palliative RT to her hip. Med Onc follow up. Rad onc follow up. \par \par # Pain 2/2 Neoplasm - c/w Methadone 5mg BID, PRN oxycodone IR 5-10mg. Continue to log usage. \par \par # Nausea/Low appetite - Pt to obtain medicinal cannabis. Recommend small, frequent meals. \par \par # Generalized weakness - Patient continues to require significant assistance with her ADLs. \par \par # Encounter for palliative care- Emotional support provided \par Patient's daughter provided copy of HCP form for our records; scanned into EMR. \par \par Follow up in 2 weeks, call sooner with questions or issues.

## 2023-04-03 NOTE — PHYSICAL EXAM
[General Appearance - Alert] : alert [Affect] : the affect was normal [FreeTextEntry1] : thin, weak-appearing

## 2023-04-03 NOTE — HISTORY OF PRESENT ILLNESS
[FreeTextEntry1] : 58yoF with ampullary pancreaticobiliary carcinoma presents for follow up visit.\par PMH significant for DM on Metformin, HTN. \par \par Patient preferred language is Spanish. She requests that her daughter Olegario speak on her behalf. \par \par Patient originally diagnosed with ampullary carcinoma 12/2018 after presenting with dizziness, found to have elevated LFTs. MRCP demonstrated a dilatation of the CBD. ERCP on 12/22/18 performed c/w ampullary mass with obstruction. Pathology c/w adenocarcinoma. On 1/11/19 pt underwent ex-lap, whipple pancreaticoduodenectomy for duodenal adenocarcinoma, post-op course c/b hemorrhagic shock, drop in H/H to 6/19 lactate 9. Taken to the OR for evacuation of hematoma, cessation of gastroduodenal artery bleeding with two hemostatic stitches, and placement of abthera VAC. Course further complicated by infection and prolonged SICU stay requiring intubation. \par \par 4/30-10/21/19: Received 8 cycles Gemzar/Xeloda \par 1/20-11/2020: prolonged stay in [facility] due to COVID \par 1/21/22: ventral hernia repair \par 3/10/22: CT abd/pel: significant interval improvement of previously described rectus/anterior abdominal wall hematoma, previously described focal hematoma at the left anterior abdominal wall is decreased in size\par 7/1/22: PET/CT: Masslike consolidation in the anterior left upper lung with intense uptake and left paratracheal lymph node, small focus of uptake in the right hepatic lobe near the severino hepatis suspicious for metastatic lesion.\par 7/19-7/22/22: [facility] admission s/p bronchoscopy 7/20/22; 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  Has been very fatigued and weak. \par \par 9/7/22 Interval History: Patient's tx was held due to low ANC count. She continued to report persistent cough (uses Robitussin and Promethazine/Codeine cough syrup 10 ml q 4-6 hrs, it provides mild/temporary relief). Her fatigue, peripheral neuropathy of b/l hands + feet, chest and back pain remain unchanged (uses Oxycodone PRN). Patient's daughter is making a conscious effort to reinforce her oral intake (stable weight noted today). Her bowel movements remain regular (normal in color, consistency, frequency). \par \par \par Main reason for which patient is referred is pain and symptom management. \par She has been experiencing pain and burning in the upper abdomen, always present. No relation to eating. \par She uses Oxycodone IR 5mg on a PRN basis, is using it 1-2 times daily. Daughter notes that the pain spikes in the 2-4 days after chemotherapy. Pt rates her pain at 8/10 at its worst. Presently rates it at 0. \par She was hospitalized at [facility]; underwent surgery- L hemiarthroplasty on 1/23. Completed RT on 1/24 x 5 sessions. \par \par Interval History (3/30/23):\par Patient seen for follow up via telemedicine. Treatment was changed to 5-Fu following POD, she received one dose so far. Treatment was held this week for decline in performance status and weakness. She is planned to begin 5 fractions of palliative radiation to her hip. She will discuss with her oncologist if further chemotherapy is appropriate given her ongoing weakness and fatigue. \par Pain is better controlled on methadone 5mg BID and PRN oxycodone 10mg q4, typically one to two times a day.   Pain is located to her hip, left leg and ankle; pain is exacerbated with ambulation. Pain averages 4/10. \par Continues to lose weight despite eating regularly. Last week following treatment she experienced nausea and vomiting that lasted on week, she received minimal relief from PRN antiemetics. She tried to purchase medical cannabis but the dispensary did not have the formulation she wanted. She hopes this will increase her appetite and help to maintain her weight. Moving her bowels regularly. Sleep disturbances secondary to pain overnight. She naps throughout the day unless she has doctors appointments. \par \par ROS:\par +weakness - requires assistance with ADLs\par +weight loss - down to about 95lbs most recently, per daughter. \par +chest/back pain - she uses methadone and oxycodone IR 5mg\par +low appetite/ early satiety - daughter states that pt mainly eats rice, soup.  Pt desires very little other food.  She drinks ~1 Glucerna daily. \par +chronic cough - Promethazine/Codeine syrup\par +nausea, no vomiting - uses metoclopramide PRN \par +trouble sleeping at night \par +constipation - takes miralax as needed, senna and dulcolax daily.  Last BM was yesterday. Averages a BM every 1-2 days. \par +cough - uses promethazine-codeine syrup PRN, Guaifenesin PRN - these help\par +generalized itching \par Denies mood changes, anxiety.  \par \par Ambulates with the assistance of a cane. She has a shower chair. No recent falls reported. \par \par Patient is , lives with her  and two children (ages 32, 20) in an apartment in the [place]. She ambulates with a cane when needed. Her daughter is her HHA through the CDPAP providing 56 hours of care a week. Her daughter assists with her ADL's. Pt spends her day reading and watching TV. She feels supported by family. \par \par PCP: Dr. Marisol Tripp ([place]) \par \par I-STOP Ref#:  [id number]

## 2023-04-03 NOTE — REASON FOR VISIT
[Follow-Up] : a follow-up visit [Other: _____] : [unfilled] [Home] : at home, [unfilled] , at the time of the visit. [Medical Office: ([facility])___] : at the medical office located in  [Patient] : the patient

## 2023-04-04 ENCOUNTER — APPOINTMENT (OUTPATIENT)
Dept: INFUSION THERAPY | Facility: HOSPITAL | Age: 58
End: 2023-04-04

## 2023-04-04 ENCOUNTER — NON-APPOINTMENT (OUTPATIENT)
Age: 58
End: 2023-04-04

## 2023-04-04 ENCOUNTER — APPOINTMENT (OUTPATIENT)
Dept: HEMATOLOGY ONCOLOGY | Facility: CLINIC | Age: 58
End: 2023-04-04
Payer: MEDICAID

## 2023-04-04 PROCEDURE — 99215 OFFICE O/P EST HI 40 MIN: CPT

## 2023-04-04 NOTE — PHYSICAL EXAM
[Capable of only limited self care, confined to bed or chair more than 50% of waking hours] : Status 3- Capable of only limited self care, confined to bed or chair more than 50% of waking hours [Thin] : thin [Normal] : affect appropriate

## 2023-04-04 NOTE — REVIEW OF SYSTEMS
[Fatigue] : fatigue [Recent Change In Weight] : ~T recent weight change [Vomiting] : vomiting [Joint Pain] : joint pain [Joint Stiffness] : joint stiffness [Difficulty Walking] : difficulty walking [Negative] : Allergic/Immunologic

## 2023-04-04 NOTE — ASSESSMENT
[Supportive] : Goals of care discussed with patient: Supportive [Palliative Care Plan] : patient was apprised of terminal prognosis of 6 months or less. Referral made to Hospice or Palliative Care Service

## 2023-04-05 DIAGNOSIS — R52 PAIN, UNSPECIFIED: ICD-10-CM

## 2023-04-06 ENCOUNTER — NON-APPOINTMENT (OUTPATIENT)
Age: 58
End: 2023-04-06

## 2023-04-06 ENCOUNTER — APPOINTMENT (OUTPATIENT)
Dept: INFUSION THERAPY | Facility: HOSPITAL | Age: 58
End: 2023-04-06

## 2023-04-06 VITALS
SYSTOLIC BLOOD PRESSURE: 111 MMHG | HEART RATE: 73 BPM | WEIGHT: 92 LBS | HEIGHT: 60 IN | TEMPERATURE: 97.7 F | OXYGEN SATURATION: 99 % | RESPIRATION RATE: 17 BRPM | BODY MASS INDEX: 18.06 KG/M2 | DIASTOLIC BLOOD PRESSURE: 68 MMHG

## 2023-04-06 VITALS — WEIGHT: 89 LBS | BODY MASS INDEX: 17.38 KG/M2

## 2023-04-06 RX ORDER — ONDANSETRON 8 MG/1
8 TABLET, ORALLY DISINTEGRATING ORAL
Qty: 30 | Refills: 2 | Status: ACTIVE | COMMUNITY
Start: 2023-04-06 | End: 1900-01-01

## 2023-04-06 NOTE — VITALS
[Maximal Pain Intensity: 8/10] : 8/10 [Least Pain Intensity: 7/10] : 7/10 [Pain Description/Quality: ___] : Pain description/quality: [unfilled] [Pain Duration: ___] : Pain duration: [unfilled] [Pain Location: ___] : Pain Location: [unfilled] [Pain Interferes with ADLs] : Pain interferes with activities of daily living. [Opioid] : opioid [40: Disabled, requires special care and assistance.] : 40: Disabled, requires special care and assistance. [ECOG Performance Status: 4 - Completely disabled. Cannot carry on any self care. Totally confined to bed or chair] : Performance Status: 4 - Completely disabled. Cannot carry on any self care. Totally confined to bed or chair

## 2023-04-07 ENCOUNTER — NON-APPOINTMENT (OUTPATIENT)
Age: 58
End: 2023-04-07

## 2023-04-07 PROCEDURE — 77427 RADIATION TX MANAGEMENT X5: CPT

## 2023-04-09 NOTE — HISTORY OF PRESENT ILLNESS
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 \par \par Initially was admitted to [facility] on 12/18/18 with dizziness, found to have elevated LFTs. MRCP demonstrated a dilatation of the CBD. ERCP on 12/22/18 performed c/w ampullary mass with obstruction. Pathology c/w adenocarcinoma. Pt was discharged for out pt follow up with surg onc and med onc but was re-admitted on 1/5/19 with n/v, worsening abdominal pain, found to have severe sepsis with E coli bacteremia 2/2 acute cholangitis. \par 1/7: ERCP performed. Stent was removed from the biliary tree. One plastic stent placed\par 1/5 CT A/P Heterogeneity of the right hepatic lobe with questionable 1.3 cm hypoattenuating lesion in segment 7.\par 1/8 MRI Abd: No suspicious liver lesions. No abnormality is identified to correspond with questionable lesion identified on prior CT. 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  Taken to the OR for evacuation of hematoma, cessation of gastroduodenal artery bleeding with two hemostatic stitches, and placement of abthera VAC.  Required pressors in the SICU, then developed fever. \par 1/21-1/22: OR exploratory laparotomy, drainage of abdominal abscess, repair of abdominal wall hernia with Strattice mesh, b/ abdominal wall advancement flaps. Post-op patient remained intubated and off vasopressors. FENA calculated to be pre-renal. 500CC albumin bolus given, urine output improved. 1/22/19 attempted spontaneous breathing trials but pt hypoxic. Pt lactate uptrending, resuscitated w/IVF, and pt hypotensive requiring pressors. UCx growing candida, Diflucan changed to micafungin to r/o resistant organisms in the setting of worsening sepsis. \par 1/23/19, patient noted to have blood oozing from midline incision while hypotensive and on pressors. Peak pressures noted to be elevated on vent and abdomen increasingly distended. All staples removed and a large amount of clot evacuated from midline incision. 1U PRBC given. No active bleeding appreciated. Fascia remains closed. Wound packed with wet to dry dressing. Vitamin K given for elevated INR. \par 1/24-1/29: remained in SICU, started on TPN, diuresed, finally extubated. \par 2/1: septic shock again, pressors restarted\par 2/2-2/11: CT scan with peritonitis, new abscesses. Ab and Antifungals started. Wound with increase in drainage. Octreotide started for increased output \par 2/12: drainage of abscesses by IR.  \par 2/18: Wound was partially closed by plastics with interrupted sutures and an ostomy appliance was placed, VAC removed.\par 3/1/19. 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 s/p embolization\par 3/14/22: CT A/P: nodular hepatic contour, stomach is distended, improvement in hematoma \par 7/1/22: PET/CT: Masslike consolidation in the anterior left upper lung with intense uptake and left paratracheal lymph node, small focus of uptake in the right hepatic lobe near the severino hepatis suspicious for metastatic lesion.\par 7/19-7/22/22: [facility] admission s/p bronchoscopy 7/20/22; 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) \par 8/22-12/27/22: Gemzar/Abraxane Q 2 weeks\par 9/25-10/12/22: RT to YNUI mass \par 10/22-11/1/22: admitted to [facility] with fever\par 12/12/22: MR L/T spine: concern for L4 met\par 12/23/22: Bone scan: abnormal foci in L femoral neck and L spine suspicious for osseous mets, risk for fx in femoral neck \par 12/23/22: CT CAP: previously identified lung mass is not identified, no evidence of mets \par 12/30-2/2/23: admitted to [facility] due to inability to ambulate. Completed RT on 1/24 x 5 sessions. L hemiarthroplasty on 1/23. Recommendation for FARIBA but pt went home due to preference. \par 3/7/23: CT CAP: s/p Whipple, no evidence of local recurrence, adenopathy in mediastinum, improvement in YUNI airspace opacity, soft tissue nodule in abdominal scar concerning for metastasis. \par 3/14/23: C1 5FU/LV \par \par  [de-identified] : CA 19-9 34 [de-identified] : adenocarcinoma [de-identified] : FINAL PATHOLOGY:\par -Invasive adenocarcinoma of the duodenal ampulla (Tumor size: 2.5 x 1.5 x 0.8 cm); Tumor stage: pT3b pN1\par -Vascular and perineural invasion identified\par -1/24 lymph nodes involved by adenocarcinoma\par -Negative margins\par -S/p cholecystectomy ; acute cholecystitis with cholelithiasis \par -Bile duct margin with acute inflammation and focal atypia consistent with low grade dysplasia. \par  [FreeTextEntry1] : chemo on hold  [de-identified] : seen in tx room during hydration. pt with n/v, L hip pain. Ran out of methadone 3 days ago, did not call for refill until yesterday. at home pt reports significant weakness, inability to perform ADLs. Frustrated and saddened by this. GOC discussion took place with pt's daughter and pt.

## 2023-04-09 NOTE — REASON FOR VISIT
[Follow-Up Visit] : a follow-up [Family Member] : family member [[employer] ] : provided by [employer]   [FreeTextEntry2] : Stage IV ampullary ca  [Interpreters_IDNumber] : [id number] [TWNoteComboBox1] : Tejal

## 2023-04-10 ENCOUNTER — APPOINTMENT (OUTPATIENT)
Dept: INFUSION THERAPY | Facility: HOSPITAL | Age: 58
End: 2023-04-10

## 2023-04-12 ENCOUNTER — APPOINTMENT (OUTPATIENT)
Dept: INFUSION THERAPY | Facility: HOSPITAL | Age: 58
End: 2023-04-12

## 2023-04-13 ENCOUNTER — APPOINTMENT (OUTPATIENT)
Dept: GERIATRICS | Facility: CLINIC | Age: 58
End: 2023-04-13

## 2023-04-17 ENCOUNTER — APPOINTMENT (OUTPATIENT)
Dept: INFUSION THERAPY | Facility: HOSPITAL | Age: 58
End: 2023-04-17

## 2023-04-18 ENCOUNTER — NON-APPOINTMENT (OUTPATIENT)
Age: 58
End: 2023-04-18

## 2023-04-19 ENCOUNTER — APPOINTMENT (OUTPATIENT)
Dept: INFUSION THERAPY | Facility: HOSPITAL | Age: 58
End: 2023-04-19

## 2023-04-24 ENCOUNTER — APPOINTMENT (OUTPATIENT)
Dept: INFUSION THERAPY | Facility: HOSPITAL | Age: 58
End: 2023-04-24

## 2023-04-24 NOTE — PATIENT PROFILE ADULT - FALL HARM RISK - DEVICES
[FreeTextEntry1] : 76 yo man with asthma/COPD retired \par Hx CLL, no Rx, MGUS noted\par CAD s/p stenting\par Ao stenosis--followed but valve area is over 1.0 at this time\par s/p CVA On anticoagulation\par CT in April 2022 showed a 5 mm RUL nodule\par \par Interim\par Patient continues to feel fine--he does not take any inhalers and still does not think he needs them\par \par Sent for f/u CT done in April 23:\par Nodule noted in RUL has decreased in size from 5 to 3 mm and felt to represent inspissated secretions and not a mass\par \par Imp\par 76 yo gentleman with asthma which appears to be stable and the patient does not take any inhalers regularly\par Would use albuterol PRN and f/u in one year unless earlier required\par \par Involution of density in RUL which does not require further f/u Cane

## 2023-04-26 ENCOUNTER — APPOINTMENT (OUTPATIENT)
Dept: INFUSION THERAPY | Facility: HOSPITAL | Age: 58
End: 2023-04-26

## 2023-04-27 ENCOUNTER — OUTPATIENT (OUTPATIENT)
Dept: OUTPATIENT SERVICES | Facility: HOSPITAL | Age: 58
LOS: 1 days | Discharge: HOME | End: 2023-04-27

## 2023-04-27 DIAGNOSIS — K92.2 GASTROINTESTINAL HEMORRHAGE, UNSPECIFIED: Chronic | ICD-10-CM

## 2023-04-27 DIAGNOSIS — Z51.11 ENCOUNTER FOR ANTINEOPLASTIC CHEMOTHERAPY: Chronic | ICD-10-CM

## 2023-04-27 DIAGNOSIS — Z90.410 ACQUIRED TOTAL ABSENCE OF PANCREAS: Chronic | ICD-10-CM

## 2023-04-27 DIAGNOSIS — C24.1 MALIGNANT NEOPLASM OF AMPULLA OF VATER: ICD-10-CM

## 2023-04-27 DIAGNOSIS — Z98.890 OTHER SPECIFIED POSTPROCEDURAL STATES: Chronic | ICD-10-CM

## 2023-05-01 ENCOUNTER — APPOINTMENT (OUTPATIENT)
Dept: INFUSION THERAPY | Facility: HOSPITAL | Age: 58
End: 2023-05-01

## 2023-05-02 NOTE — REASON FOR VISIT
[Follow-Up] : a follow-up visit [Other: _____] : [unfilled] [Home] : at home, [unfilled] , at the time of the visit. [Medical Office: ([facility])___] : at the medical office located in  [Patient] : the patient

## 2023-05-03 ENCOUNTER — APPOINTMENT (OUTPATIENT)
Dept: INFUSION THERAPY | Facility: HOSPITAL | Age: 58
End: 2023-05-03

## 2023-05-04 ENCOUNTER — APPOINTMENT (OUTPATIENT)
Dept: GERIATRICS | Facility: CLINIC | Age: 58
End: 2023-05-04
Payer: MEDICAID

## 2023-05-04 DIAGNOSIS — R11.0 NAUSEA: ICD-10-CM

## 2023-05-04 DIAGNOSIS — G89.3 NEOPLASM RELATED PAIN (ACUTE) (CHRONIC): ICD-10-CM

## 2023-05-04 DIAGNOSIS — Z51.5 ENCOUNTER FOR PALLIATIVE CARE: ICD-10-CM

## 2023-05-04 PROCEDURE — 99214 OFFICE O/P EST MOD 30 MIN: CPT | Mod: 95

## 2023-05-04 NOTE — HISTORY OF PRESENT ILLNESS
[FreeTextEntry1] : 58yoF with ampullary pancreaticobiliary carcinoma presents for follow up visit.\par PMH significant for DM on Metformin, HTN. \par \par Patient preferred language is Persian. She requests that her daughter Olegario speak on her behalf. \par \par Patient originally diagnosed with ampullary carcinoma 12/2018 after presenting with dizziness, found to have elevated LFTs. MRCP demonstrated a dilatation of the CBD. ERCP on 12/22/18 performed c/w ampullary mass with obstruction. Pathology c/w adenocarcinoma. On 1/11/19 pt underwent ex-lap, whipple pancreaticoduodenectomy for duodenal adenocarcinoma, post-op course c/b hemorrhagic shock, drop in H/H to 6/19 lactate 9. Taken to the OR for evacuation of hematoma, cessation of gastroduodenal artery bleeding with two hemostatic stitches, and placement of abthera VAC. Course further complicated by infection and prolonged SICU stay requiring intubation. \par \par 4/30-10/21/19: Received 8 cycles Gemzar/Xeloda \par 1/20-11/2020: prolonged stay in [facility] due to COVID \par 1/21/22: ventral hernia repair \par 3/10/22: CT abd/pel: significant interval improvement of previously described rectus/anterior abdominal wall hematoma, previously described focal hematoma at the left anterior abdominal wall is decreased in size\par 7/1/22: PET/CT: Masslike consolidation in the anterior left upper lung with intense uptake and left paratracheal lymph node, small focus of uptake in the right hepatic lobe near the severino hepatis suspicious for metastatic lesion.\par 7/19-7/22/22: [facility] admission s/p bronchoscopy 7/20/22; 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  Has been very fatigued and weak. \par \par 9/7/22 Interval History: Patient's tx was held due to low ANC count. She continued to report persistent cough (uses Robitussin and Promethazine/Codeine cough syrup 10 ml q 4-6 hrs, it provides mild/temporary relief). Her fatigue, peripheral neuropathy of b/l hands + feet, chest and back pain remain unchanged (uses Oxycodone PRN). Patient's daughter is making a conscious effort to reinforce her oral intake (stable weight noted today). Her bowel movements remain regular (normal in color, consistency, frequency). \par \par \par Main reason for which patient is referred is pain and symptom management. \par She has been experiencing pain and burning in the upper abdomen, always present. No relation to eating. \par She uses Oxycodone IR 5mg on a PRN basis, is using it 1-2 times daily. Daughter notes that the pain spikes in the 2-4 days after chemotherapy. Pt rates her pain at 8/10 at its worst. Presently rates it at 0. \par She was hospitalized at [facility]; underwent surgery- L hemiarthroplasty on 1/23. Completed RT on 1/24 x 5 sessions. \par \par Interval History (5/4/23):\par Patient seen for follow up via telemedicine. She received 5 fractions of palliative RT to her L hip which did not seem to help much. She is currently receiving home hospice with [facility]. \par She is using methadone 5mg BID and PRN oxycodone 10mg, gets it once or twice a day, sometimes daughter does not need to give it to her if she's more sedentary and not reporting acute pain.\par Continues to lose weight despite eating regularly. Last week following treatment she experienced nausea and vomiting that lasted on week, she received minimal relief from PRN antiemetics. She tried to purchase medical cannabis but the dispensary did not have the formulation she wanted. She hopes this will increase her appetite and help to maintain her weight. Moving her bowels regularly. Sleep disturbances secondary to pain overnight. She naps throughout the day unless she has doctors appointments. \par \par ROS:\par +weakness - requires assistance with ADLs\par +weight loss - down to about 95lbs most recently, per daughter. \par +chest/back pain - she uses methadone and oxycodone IR 5mg\par +low appetite/ early satiety - daughter states that pt mainly eats rice, soup.  Pt desires very little other food.  She drinks ~1 Glucerna daily. \par +chronic cough - Promethazine/Codeine syrup\par +n/v - hospice \par +trouble sleeping at night \par +constipation - takes miralax as needed, senna and dulcolax daily.  Last BM was yesterday. Averages a BM every 1-2 days. \par +cough - uses promethazine-codeine syrup PRN, Guaifenesin PRN - these help\par +generalized itching \par Denies mood changes, anxiety.  \par \par Ambulates with the assistance of a cane. She has a shower chair. No recent falls reported. \par \par Patient is , lives with her  and two children (ages 32, 20) in an apartment in the [place]. She ambulates with a cane when needed. Her daughter is her HHA through the [facility] providing 56 hours of care a week. Her daughter assists with her ADL's. Pt spends her day reading and watching TV. She feels supported by family. \par \par PCP: Dr. Marisol Tripp ([place]) \par \par I-STOP Ref#: [id number]

## 2023-05-04 NOTE — ASSESSMENT
[______] : HCP: [unfilled] [FreeTextEntry1] : 58yoF with:\par \par # Ampullary pancreaticobiliary carcinoma - No further DMT being pursued/offered. Hospice care at home is in place.\par \par # Pain 2/2 Neoplasm - c/w Methadone 5mg BID, PRN oxycodone IR 5-10mg. Continue to log usage. \par \par # Nausea/Low appetite - on dex and haldol per Hospice.\par \par # Generalized weakness - Patient continues to require significant assistance with her ADLs. \par \par # Encounter for palliative care- Emotional support provided to daughter.\par Patient's daughter provided copy of HCP form for our records; scanned into EMR. \par \par Follow up as needed, call with questions/issues.  Defer palliative care to the Home Hospice team.

## 2023-05-08 ENCOUNTER — APPOINTMENT (OUTPATIENT)
Dept: HEMATOLOGY ONCOLOGY | Facility: CLINIC | Age: 58
End: 2023-05-08
Payer: MEDICAID

## 2023-05-08 PROCEDURE — ZZZZZ: CPT

## 2023-05-08 RX ORDER — METHADONE HYDROCHLORIDE 5 MG/1
5 TABLET ORAL
Qty: 45 | Refills: 0 | Status: ACTIVE | COMMUNITY
Start: 2022-12-16 | End: 1900-01-01

## 2023-05-10 NOTE — DISCHARGE NOTE ADULT - MEDICATION SUMMARY - MEDICATIONS TO STOP TAKING
I will STOP taking the medications listed below when I get home from the hospital:    predniSONE 1 mg oral tablet  -- 1 tab(s) by mouth once a day with meal  for 14 days 82

## 2023-05-11 ENCOUNTER — APPOINTMENT (OUTPATIENT)
Dept: RADIATION ONCOLOGY | Facility: CLINIC | Age: 58
End: 2023-05-11
Payer: MEDICAID

## 2023-05-11 DIAGNOSIS — C79.51 SECONDARY MALIGNANT NEOPLASM OF BONE: ICD-10-CM

## 2023-05-11 PROCEDURE — 99024 POSTOP FOLLOW-UP VISIT: CPT

## 2023-05-11 NOTE — HISTORY OF PRESENT ILLNESS
[FreeTextEntry1] : Ms Mae Lagunas is a 57yo female with hx of metastatic ampullary CA s/p whipple/Gemzar-Xeloda and then radiation therapy to the left lung (45gy/15fx) in Sept 2022 for oliogmetastic progression. She was then admitted in Jan 2023 and treated with palliative radiation to the prox left femur (20Gy/5fx). She now presents with further progression in the lumbar spine involving L1/L4/L5 causing pain. Given the progression and number of spinal levels, I recommended palliative radiation to T12-S1 for pain. \par \par 4/6/2023: Patient presents to clinic for OTV s/p 4/5 FX of 2000 cGy to T12- S1. Nausea and vomiting frequency increase, especially with motion a few times a day since last chemo 3/13 denies blood in vomit. Continuous Globus sensation. Decreasing appetite. Pain has improved slightly but still continues. Starts on right lower back and radiating to left side and left leg 8/10 pain scale.  SOB with minimal exertion. Coughing minimal, denies hemoptysis. Denies any  symptoms. Constipation continues and uses Miralax. \par Patient provided with discharge education and discharge folder, patient verbalized understanding. Patient aware to contact office should any questions or concerns arise prior to scheduled PTE appointment. Ensured patient had office contact information.\par

## 2023-05-11 NOTE — REASON FOR VISIT
[Post-Treatment Evaluation] : post-treatment evaluation for [Bone Metastasis] : bone metastasis [Other: _____] : [unfilled] [Patient Declined  Services] : - None: Patient declined  services [Interpreters_FullName] : Olegario [Interpreters_Relationshiptopatient] : Daughter [FreeTextEntry3] : Tejal [Participant] : the participant [Self] : self [This encounter was initiated by telehealth (audio with video) and converted to telephone (audio only) due to technical difficulties.] : This encounter was initiated by telehealth (audio with video) and converted to telephone (audio only) due to technical difficulties. [FreeTextEntry4] : Daughter and Mariely Kraus RN [FreeTextEntry6] : Bone Metastasis

## 2023-05-11 NOTE — REVIEW OF SYSTEMS
[Constipation: Grade 2 - Persistent symptoms with regular use of laxatives or enemas; limiting instrumental ADL] : Constipation: Grade 2 - Persistent symptoms with regular use of laxatives or enemas; limiting instrumental ADL [Diarrhea: Grade 0] : Diarrhea: Grade 0 [Nausea: Grade 1 - Loss of appetite without alteration in eating habits] : Nausea: Grade 1 - Loss of appetite without alteration in eating habits [Vomiting: Grade 0] : Vomiting: Grade 0 [de-identified] : improved [de-identified] : improved [Fatigue: Grade 2 - Fatigue not relieved by rest; limiting instrumental ADL] : Fatigue: Grade 2 - Fatigue not relieved by rest; limiting instrumental ADL [Urinary Incontinence: Grade 0] : Urinary Incontinence: Grade 0  [Urinary Retention: Grade 0] : Urinary Retention: Grade 0

## 2023-05-11 NOTE — REASON FOR VISIT
[Routine On-Treatment] : a routine on-treatment visit for [Bone Metastasis] : bone metastasis [Other: _____] : [unfilled] [Patient Declined  Services] : - None: Patient declined  services [Interpreters_FullName] : Olegario [Interpreters_Relationshiptopatient] : Daughter [FreeTextEntry3] : Tejal

## 2023-05-11 NOTE — DISEASE MANAGEMENT
[Clinical] : TNM Stage: c [TTNM] : - [NTNM] : - [MTNM] : 1 [IV] : IV [de-identified] : 4 fx/1600 cGy  [de-identified] : 2000 cGy [de-identified] : T12-S1

## 2023-05-11 NOTE — REVIEW OF SYSTEMS
[Vomiting: Grade 2 - 3 - 5 episodes ( by 5 minutes) in 24 hrs] : Vomiting: Grade 2 - 3 - 5 episodes ( by 5 minutes) in 24 hrs [Fatigue: Grade 3 - Fatigue not relieved by rest, limiting self care ADL] : Fatigue: Grade 3 - Fatigue not relieved by rest, limiting self care ADL [Constipation: Grade 2 - Persistent symptoms with regular use of laxatives or enemas; limiting instrumental ADL] : Constipation: Grade 2 - Persistent symptoms with regular use of laxatives or enemas; limiting instrumental ADL [Nausea: Grade 2 - Oral intake decreased without significant weight loss, dehydration or malnutrition] : Nausea: Grade 2 - Oral intake decreased without significant weight loss, dehydration or malnutrition [Dyspnea: Grade 2 - Shortness of breath with minimal exertion; limiting instrumental ADL] : Dyspnea: Grade 2 - Shortness of breath with minimal exertion; limiting instrumental ADL [FreeTextEntry2] : uses Miralax [de-identified] : Since last chemo, a few times a day exacerbated by motion

## 2023-05-11 NOTE — HISTORY OF PRESENT ILLNESS
[FreeTextEntry1] : Ms Mae Lagunas is a 59yo female with hx of metastatic ampullary CA s/p whipple/Gemzar-Xeloda and then radiation therapy to the left lung (45gy/15fx) in Sept 2022 for oliogmetastic progression. She was then admitted in Jan 2023 and treated with palliative radiation to the prox left femur (20Gy/5fx). She now presents with further progression in the lumbar spine involving L1/L4/L5 causing pain. Given the progression and number of spinal levels, I recommended palliative radiation to T12-S1 for pain. \par \par 5/11/2023: Patient presents to clinic for PTE. Patient received 20 Gy in 5 FX from 4/3/2023-4/7/2023 to T12-S1. Patient experienced CTCAE grade 3 fatigue, otherwise no other grade 3 or higher side effects. \par left leg pain persists at 7/10 pain scale. Constipation continues, prescribed Bisacodyl, has not started yet. Nausea is improved under control without  vomiting. denies abdominal pain. 7-10 days c/o pain in right hand radiating from shoulder. Patient on home hospice service.

## 2023-05-11 NOTE — VITALS
[Maximal Pain Intensity: 7/10] : 7/10 [Least Pain Intensity: 0/10] : 0/10 [Pain Duration: ___] : Pain duration: [unfilled] [Pain Location: ___] : Pain Location: [unfilled] [Opioid] : opioid [50: Requires considerable assistance and frequent medical care.] : 50: Requires considerable assistance and frequent medical care.

## 2023-05-15 ENCOUNTER — APPOINTMENT (OUTPATIENT)
Dept: INFUSION THERAPY | Facility: HOSPITAL | Age: 58
End: 2023-05-15

## 2023-05-17 ENCOUNTER — APPOINTMENT (OUTPATIENT)
Dept: INFUSION THERAPY | Facility: HOSPITAL | Age: 58
End: 2023-05-17

## 2023-05-19 NOTE — H&P ADULT - ASSESSMENT
Pt is a 56 yo F with PMH pancreat CA (mets to L spine, L femur; Cong, chemo ambraxane/gemzar 12/27, radiation ?12/15), T2D, GERD, asthma, and HTN p/w LLE pain x 1mo with difficulty ambulating x1wk. Likely in the setting of known metastatic disease. Admitted for PT eval and pain control with heme-onc consulted and recs pending.  How Severe Is Your Cyst?: severe Is This A New Presentation, Or A Follow-Up?: Cyst

## 2023-05-30 ENCOUNTER — APPOINTMENT (OUTPATIENT)
Dept: INFUSION THERAPY | Facility: HOSPITAL | Age: 58
End: 2023-05-30

## 2023-06-01 ENCOUNTER — APPOINTMENT (OUTPATIENT)
Dept: INFUSION THERAPY | Facility: HOSPITAL | Age: 58
End: 2023-06-01
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
## 2023-06-12 ENCOUNTER — APPOINTMENT (OUTPATIENT)
Dept: INFUSION THERAPY | Facility: HOSPITAL | Age: 58
End: 2023-06-12

## 2023-06-14 ENCOUNTER — APPOINTMENT (OUTPATIENT)
Dept: INFUSION THERAPY | Facility: HOSPITAL | Age: 58
End: 2023-06-14

## 2023-06-28 ENCOUNTER — APPOINTMENT (OUTPATIENT)
Dept: ORTHOPEDIC SURGERY | Facility: CLINIC | Age: 58
End: 2023-06-28

## 2023-06-28 DIAGNOSIS — C24.1 MALIGNANT NEOPLASM OF AMPULLA OF VATER: ICD-10-CM

## 2023-06-28 DIAGNOSIS — Z98.890 OTHER SPECIFIED POSTPROCEDURAL STATES: ICD-10-CM

## 2023-07-31 NOTE — PHYSICAL THERAPY INITIAL EVALUATION ADULT - STANDING BALANCE: DYNAMIC, REHAB EVAL
good minus Oral Minoxidil Pregnancy And Lactation Text: This medication should only be used when clearly needed if you are pregnant, attempting to become pregnant or breast feeding.

## 2023-09-25 NOTE — REVIEW OF SYSTEMS
15 [Fatigue] : fatigue [Recent Change In Weight] : ~T recent weight change [Abdominal Pain] : abdominal pain [Negative] : Allergic/Immunologic

## 2023-10-02 PROBLEM — C79.51 METASTATIC CANCER TO BONE: Status: ACTIVE | Noted: 2023-02-22

## 2023-10-06 NOTE — PROGRESS NOTE ADULT - PROBLEM SELECTOR PLAN 4
Son called, he stated that the patient had an EEG a couple months ago and they never heard back about the results.   - , AST 81, ALT 53  - ALP at baseline per chart review  - AST/ALT elevated from baseline -- pt denies toxic substances; likely in setting of poor PO intake   - continue to trend

## 2023-11-21 NOTE — ASU PATIENT PROFILE, ADULT - URINARY CATHETER
Pt taken directly to CT upon arrival after being assessed by Dr. Oneill on the way.      Gretel Grier RN  11/21/23 [id number]     no

## 2023-12-31 PROBLEM — Z23 NEED FOR VACCINATION WITH 13-POLYVALENT PNEUMOCOCCAL CONJUGATE VACCINE: Status: RESOLVED | Noted: 2019-04-17 | Resolved: 2023-12-31

## 2024-01-01 NOTE — PROGRESS NOTE ADULT - ASSESSMENT
Urology Clinic Note, First Consult Visit    [facility] [address]  [place] [address]    RE:  Kayla Bagley  :  2023  Francesco MRN:  [id number]  Date of visit:  2024     History of Present Illness     Dear Dr. [facility],    I had the pleasure to seeing Kayla and her parents in the Pediatric Urology Clinic today.  As you know she is a 4 week old Female referred to our clinic for an incidental finding of renal ectopia.       The history is obtained from her mother and father.    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  She was born at 37 week by C section and admitted in the NICU  due severe respiratory distress but is now healthy and normal.  She has no history of urinary tract infections or unexplained fevers.  She has never been on prophylactic antibiotics. Does have some constipation. She has an older sister that is healthy and normal.     She is otherwise currently healthy and doing well with eating, sleeping, making plenty of wet diapers.     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    Renal US from 24 showing a left to right-sided crossed fused renal ectopia with no hydronephrosis seen in any of the renal moieties.   Bladder was partially filled and normal.       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  She has been doing clinically well, with no history of urinary tract infections and upper tract without dilatation.  Although renal ectopia is believe to have a higher risk for vesicoureteral reflux, we do not believe Kayla has an indication to performed a VCUG unless  "she develops a febriles urinary tract infection.  These findings were explained to her parents.   We would like to continue monitoring her with renal US to follow up her renal growth.     We will see Kayla back in the pediatric urology clinic in 6 months with a new renal US.  _____________________________________________________________________________    PMH:  No past medical history on file.    PSH:   No past surgical history on file.    Meds, allergies, family history, social history reviewed per intake form and confirmed in our EMR.    Physical Exam     Height 0.52 m (1' 8.47\"), weight 3.65 kg (8 lb 0.8 oz), head circumference 36.3 cm (14.29\").  Body mass index is 13.5 kg/m .  General:  Well-appearing child, in no apparent distress.  HEENT:  Normocephalic, normal facies, moist mucous membranes  Resp:  Symmetric chest wall movement, no audible respirations  Abd:  Soft, non-tender, non-distended, no palpable masses  Genitalia:  Toby stage 1, normal female external genitalia, no visible bulge at introitus  Spine:  Straight, no palpable sacral defects  Neuromuscular:  Muscles symmetrically bulked/developed  Ext:  Full range of motion  Skin:  Warm, well-perfused    If there are any additional questions or concerns please do not hesitate to contact us.    Best Regards,    Reji Shukla MD   Urology Resident PGY-4      ATTESTATION: I provided direct supervision and I was actively involved in the decision making process of the patient. I discussed/reviewed the case with the resident physician, examined the patient and agree with the findings and plan as documented in his note.  ______________________________________________________________________    Da Salinas MD  Pediatric Urology  Date of Service (when I saw the patient): 4/24/24   _______________________________________    A total of 20 minutes was spent in obtaining a history, performing a physical exam,  chart review, review of outside records, " review of test results, interpretation of tests, patient visit, documentation, and discussion with family, and counseling the patient's family.         58 yo F with PMH pancreat CA (mets to L spine, L femur; Cong, chemo ambraxane/gemzar 12/27, radiation ?12/15), T2D, GERD, asthma, and HTN p/w LLE pain x 1mo with difficulty ambulating x1wk. Likely in the setting of known metastatic disease.  Palliative Care consulted for evaluation and management of pain/ symptoms

## 2024-05-28 NOTE — PROGRESS NOTE ADULT - PROVIDER SPECIALTY LIST ADULT
Patient: Rama Gutierrez    Procedure: Procedure(s):  ROBOT-ASSISTED TOTAL LAPAROSCOPIC HYSTERECTOMY, WITH BILATERAL SALPINGECTOMY, CYSTOSCOPY       Anesthesia Type:  General    Note:  Disposition: Outpatient   Postop Pain Control: Uneventful            Sign Out: Well controlled pain   PONV: No   Neuro/Psych: Uneventful            Sign Out: Acceptable/Baseline neuro status   Airway/Respiratory: Uneventful            Sign Out: Acceptable/Baseline resp. status   CV/Hemodynamics: Uneventful            Sign Out: Acceptable CV status; No obvious hypovolemia; No obvious fluid overload   Other NRE: NONE   DID A NON-ROUTINE EVENT OCCUR? No           Last vitals:  Vitals Value Taken Time   /94 05/28/24 1235   Temp 98.2  F (36.8  C) 05/28/24 1215   Pulse 72 05/28/24 1245   Resp 9 05/28/24 1245   SpO2 98 % 05/28/24 1245       Electronically Signed By: Fransisco Pinto MD  May 28, 2024  12:53 PM   Surgery

## 2024-09-19 NOTE — ED ADULT NURSE NOTE - NSSUHOSCREENINGYN_ED_ALL_ED
[facility]  Triage Provider Note    Patient: Cheryle Davis  Age: 69 year old  Gender: female    Vitals:    09/19/24 0215   BP: (!) 162/88   Pulse: 61   Resp: 17   Temp: 98.2 °F (36.8 °C)     PMH DM presents with chest pain and cough for 2 days. Denies SOB, diaphoresis, n/v, leg swelling, hemoptysis.        This note is to be is for triage purposes only.  There is no intention to diagnose, treat or disposition patient unless otherwise stated.    .      Paola Lagunas PA  09/19/24 0227     Yes - the patient is able to be screened

## 2024-12-21 NOTE — PROVIDER CONTACT NOTE (OTHER) - BACKGROUND
Patient is complaining of abdomen pain, 
adenocarinoma of gallbladder, colongitis, ERCP, whipple, gastroduodenal arterial bleed, compartment syndrome, JOURDAN x3, IR drain x1
Patient admitted for cholangitis and adenocarcinoma.  Patient is on a clear liquid diet.
Patient admitted for cholangitis.
Pt has PCA pump, daughter reports pressing it for pt. Told that she cannot press for her.
Pt has hx of sepsis
Pt is s/p Domingo and Code fusion.
Pt post whipple procedure on 1/11/19
Pt status post wipple, hx of sepsis
Pt. s/p ade
High Risk
